# Patient Record
Sex: MALE | Race: WHITE | NOT HISPANIC OR LATINO | Employment: OTHER | URBAN - METROPOLITAN AREA
[De-identification: names, ages, dates, MRNs, and addresses within clinical notes are randomized per-mention and may not be internally consistent; named-entity substitution may affect disease eponyms.]

---

## 2017-01-26 DIAGNOSIS — Z94.0 KIDNEY TRANSPLANTED: Primary | ICD-10-CM

## 2017-01-26 RX ORDER — AZATHIOPRINE 50 MG/1
50 TABLET ORAL DAILY
Qty: 30 TABLET | Refills: 6 | Status: SHIPPED | OUTPATIENT
Start: 2017-01-26 | End: 2017-04-27

## 2017-02-02 ENCOUNTER — TRANSFERRED RECORDS (OUTPATIENT)
Dept: HEALTH INFORMATION MANAGEMENT | Facility: CLINIC | Age: 57
End: 2017-02-02

## 2017-02-03 DIAGNOSIS — Z94.0 KIDNEY TRANSPLANTED: Primary | ICD-10-CM

## 2017-02-03 RX ORDER — PREDNISONE 5 MG/1
5 TABLET ORAL DAILY
Qty: 30 TABLET | Refills: 0 | Status: SHIPPED | OUTPATIENT
Start: 2017-02-03 | End: 2017-03-08

## 2017-03-08 DIAGNOSIS — Z94.0 KIDNEY TRANSPLANTED: Primary | ICD-10-CM

## 2017-03-08 RX ORDER — PREDNISONE 5 MG/1
5 TABLET ORAL DAILY
Qty: 30 TABLET | Refills: 0 | Status: SHIPPED | OUTPATIENT
Start: 2017-03-08 | End: 2017-03-23

## 2017-03-10 DIAGNOSIS — B18.1 VIRAL HEPATITIS B CHRONIC (H): ICD-10-CM

## 2017-03-10 LAB
ALBUMIN SERPL-MCNC: 3.5 G/DL (ref 3.4–5)
ALP SERPL-CCNC: 102 U/L (ref 40–150)
ALT SERPL W P-5'-P-CCNC: 21 U/L (ref 0–70)
ANION GAP SERPL CALCULATED.3IONS-SCNC: 9 MMOL/L (ref 3–14)
AST SERPL W P-5'-P-CCNC: 22 U/L (ref 0–45)
BILIRUB DIRECT SERPL-MCNC: 0.3 MG/DL (ref 0–0.2)
BILIRUB SERPL-MCNC: 0.9 MG/DL (ref 0.2–1.3)
BUN SERPL-MCNC: 14 MG/DL (ref 7–30)
CALCIUM SERPL-MCNC: 9.2 MG/DL (ref 8.5–10.1)
CHLORIDE SERPL-SCNC: 108 MMOL/L (ref 94–109)
CO2 SERPL-SCNC: 26 MMOL/L (ref 20–32)
CREAT SERPL-MCNC: 0.96 MG/DL (ref 0.66–1.25)
ERYTHROCYTE [DISTWIDTH] IN BLOOD BY AUTOMATED COUNT: 16.5 % (ref 10–15)
GFR SERPL CREATININE-BSD FRML MDRD: 82 ML/MIN/1.7M2
GLUCOSE SERPL-MCNC: 89 MG/DL (ref 70–99)
HCT VFR BLD AUTO: 42.4 % (ref 40–53)
HGB BLD-MCNC: 13.5 G/DL (ref 13.3–17.7)
INR PPP: 0.97 (ref 0.86–1.14)
MCH RBC QN AUTO: 29.3 PG (ref 26.5–33)
MCHC RBC AUTO-ENTMCNC: 31.8 G/DL (ref 31.5–36.5)
MCV RBC AUTO: 92 FL (ref 78–100)
PLATELET # BLD AUTO: 346 10E9/L (ref 150–450)
POTASSIUM SERPL-SCNC: 4.4 MMOL/L (ref 3.4–5.3)
PROT SERPL-MCNC: 7.3 G/DL (ref 6.8–8.8)
RBC # BLD AUTO: 4.61 10E12/L (ref 4.4–5.9)
SODIUM SERPL-SCNC: 143 MMOL/L (ref 133–144)
WBC # BLD AUTO: 8.2 10E9/L (ref 4–11)

## 2017-03-14 LAB
HBV DNA SERPL NAA+PROBE-ACNC: ABNORMAL [IU]/ML
HBV DNA SERPL NAA+PROBE-LOG IU: <1.3 {LOG_IU}/ML

## 2017-03-15 ENCOUNTER — OFFICE VISIT (OUTPATIENT)
Dept: GASTROENTEROLOGY | Facility: CLINIC | Age: 57
End: 2017-03-15
Attending: PHYSICIAN ASSISTANT
Payer: MEDICARE

## 2017-03-15 ENCOUNTER — OFFICE VISIT (OUTPATIENT)
Dept: DERMATOLOGY | Facility: CLINIC | Age: 57
End: 2017-03-15

## 2017-03-15 VITALS
TEMPERATURE: 98 F | SYSTOLIC BLOOD PRESSURE: 124 MMHG | HEART RATE: 61 BPM | DIASTOLIC BLOOD PRESSURE: 84 MMHG | OXYGEN SATURATION: 96 % | WEIGHT: 191.4 LBS | HEIGHT: 67 IN | BODY MASS INDEX: 30.04 KG/M2

## 2017-03-15 DIAGNOSIS — L73.8 SEBACEOUS HYPERPLASIA: ICD-10-CM

## 2017-03-15 DIAGNOSIS — L82.0 INFLAMED SEBORRHEIC KERATOSIS: ICD-10-CM

## 2017-03-15 DIAGNOSIS — B18.1 VIRAL HEPATITIS B CHRONIC (H): Primary | ICD-10-CM

## 2017-03-15 DIAGNOSIS — L30.4 INTERTRIGO: Primary | ICD-10-CM

## 2017-03-15 DIAGNOSIS — L82.1 SK (SEBORRHEIC KERATOSIS): ICD-10-CM

## 2017-03-15 DIAGNOSIS — B07.8 COMMON WART: ICD-10-CM

## 2017-03-15 DIAGNOSIS — Z94.0 STATUS POST DECEASED-DONOR KIDNEY TRANSPLANTATION: ICD-10-CM

## 2017-03-15 DIAGNOSIS — Z85.828 HISTORY OF NONMELANOMA SKIN CANCER: ICD-10-CM

## 2017-03-15 PROCEDURE — 99212 OFFICE O/P EST SF 10 MIN: CPT | Mod: ZF

## 2017-03-15 RX ORDER — HYDROCORTISONE 25 MG/G
OINTMENT TOPICAL 2 TIMES DAILY
Qty: 28.35 G | Refills: 2 | Status: SHIPPED | OUTPATIENT
Start: 2017-03-15 | End: 2017-09-12

## 2017-03-15 ASSESSMENT — PAIN SCALES - GENERAL
PAINLEVEL: NO PAIN (0)
PAINLEVEL: NO PAIN (0)
PAINLEVEL: MILD PAIN (3)

## 2017-03-15 NOTE — MR AVS SNAPSHOT
After Visit Summary   3/15/2017    Jerad Ross    MRN: [id number]           Patient Information     Date Of Birth          1960        Visit Information        Provider Department      3/15/2017 12:00 PM Foster De Guzman MD [facility]        Today's Diagnoses     Intertrigo    -  1    SK (seborrheic keratosis)        Sebaceous hyperplasia        Common wart        History of nonmelanoma skin cancer        Inflamed seborrheic keratosis          Care Instructions    Cryotherapy    What is it?    Use of a very cold liquid, such as liquid nitrogen, to freeze and destroy abnormal skin cells that need to be removed    What should I expect?    Tenderness and redness    A small blister that might grow and fill with dark purple blood. There may be crusting.    More than one treatment may be needed if the lesions do not go away.    How do I care for the treated area?    Gently wash the area with your hands when bathing.    Use a thin layer of Vaseline to help with healing. You may use a Band-Aid.     The area should heal within 7-10 days and may leave behind a pink or lighter color.     Do not use an antibiotic or Neosporin ointment.     You may take acetaminophen (Tylenol) for pain.     Call your Doctor if you have:    Severe pain    Signs of infection (warmth, redness, cloudy yellow drainage, and or a bad smell)    Questions or concerns    Who should I call with questions?       [facility]: [phone]       [facility]: [phone]       For urgent needs outside of business hours call the [facility] at [phone]        and ask for the dermatology resident on call          Follow-ups after your visit        Your next 10 appointments already scheduled     May 15, 2017  9:00 AM CDT   RETURN GLAUCOMA with Viki Rizzo MD   Eye Clinic ([facility])    [address]  [facility] [address]   [phone]            Jun 12, 2017  2:20 PM CDT   (Arrive by 1:50 PM)   New Kidney Transplant with Nolan Lovett MD   [facility] ([facility])    [address] [place] [id number] [phone]            Sep 08, 2017 10:15 AM CDT   LAB with  LAB   [facility] ([facility])    [address] [place] [id number] [phone]           Patient must bring picture ID.  Patient should be prepared to give a urine specimen  Please do not eat 10-12 hours before your appointment if you are coming in fasting for labs on lipids, cholesterol, or glucose (sugar).  Pregnant women should follow their Care Team instructions. Water with medications is okay. Do not drink coffee or other fluids.   If you have concerns about taking  your medications, please ask at office or if scheduling via [employer][pecialists, send a message by clicking on Secure Messaging, Message Your Care Team.            Sep 12, 2017 10:10 AM CDT   (Arrive by 9:55 AM)   New General Liver with Lina Claros MD   [facility] ([facility])    [address] [place] [address] [id number] [phone]              Who to contact     Please call your clinic at [phone] to:    Ask questions about your health    Make or cancel appointments    Discuss your medicines    Learn about your test results    Speak to your doctor   If you have compliments or concerns about an experience at your clinic, or if you wish to file a complaint, please contact [facility] Patient Relations at [phone] or email us at [email or web address]         Additional Information About Your Visit        Home Inventory S[pecialists Information     [employer][pecialists gives you secure access to your electronic health record. If you see a primary care provider, you can also send messages to  your care team and make appointments. If you have questions, please call your primary care clinic.  If you do not have a primary care provider, please call [phone] and they will assist you.      [employer] is an electronic gateway that provides easy, online access to your medical records. With [employer], you can request a clinic appointment, read your test results, renew a prescription or communicate with your care team.     To access your existing account, please contact your [facility] or call [phone] for assistance.        Care EveryWhere ID     This is your Care EveryWhere ID. This could be used by other organizations to access your [place] medical records  [id number]         Blood Pressure from Last 3 Encounters:   03/15/17 124/84   08/31/16 116/79   01/21/16 128/84    Weight from Last 3 Encounters:   03/15/17 86.8 kg (191 lb 6.4 oz)   08/31/16 82.3 kg (181 lb 6.4 oz)   01/21/16 87.3 kg (192 lb 6.4 oz)              We Performed the Following     DESTRUCT BENIGN LESION, UP TO 14          Today's Medication Changes          These changes are accurate as of: 3/15/17 11:59 PM.  If you have any questions, ask your nurse or doctor.               Start taking these medicines.        Dose/Directions    hydrocortisone 2.5 % ointment   Used for:  Intertrigo   Started by:  Foster De Guzman MD        Apply topically 2 times daily   Quantity:  28.35 g   Refills:  2            Where to get your medicines      These medications were sent to [id number] - [place], [place] - [address] AT [facility] [address]  [facility] [address], [place] [address]    Hours:  24-hours Phone:  [phone]     hydrocortisone 2.5 % ointment                Primary Care Provider Office Phone # Fax #    Aston Perry [phone]        [facility]IC[facility]ANS [address] [place] [address]  [facility] [address]        Thank you!     Thank you for choosing [facility]  for your care. Our goal is always to provide you with excellent care. Hearing back from our patients is one way we can continue to improve our services. Please take a few minutes to complete the written survey that you may receive in the mail after your visit with us. Thank you!             Your Updated Medication List - Protect others around you: Learn how to safely use, store and throw away your medicines at www.disposemymeds.org.          This list is accurate as of: 3/15/17 11:59 PM.  Always use your most recent med list.                   Brand Name Dispense Instructions for use    amLODIPine 5 MG tablet    NORVASC    90 tablet    Take 1 tablet (5 mg) by mouth daily       aspirin 81 MG tablet      Take by mouth daily       atorvastatin 20 MG tablet    LIPITOR    90 tablet    Take 1 tablet (20 mg) by mouth daily You are rox to see your Cardiologist call [phone] to schedule.       azaTHIOprine 50 MG tablet    IMURAN    30 tablet    Take 1 tablet (50 mg) by mouth daily       BETA BLOCKER NOT PRESCRIBED (INTENTIONAL)      Beta Blocker not prescribed intentionally due to Bradycardia < 50 bpm without beta blocker therapy       calcium citrate-vitamin D 315-200 MG-UNIT Tabs per tablet    CITRACAL     Take 1 tablet by mouth daily.       clopidogrel 75 MG tablet    PLAVIX    90 tablet    Take 1 tablet (75 mg) by mouth daily       cycloSPORINE modified capsule     120 capsule    TAKE 2 CAPSULES BY MOUTH TWICE DAILY       entecavir 0.5 MG tablet    BARACLUDE    90 tablet    Take 1 tablet (0.5 mg) by mouth daily       hydrocortisone 2.5 % ointment     28.35 g    Apply topically 2 times daily       ibuprofen 200 MG tablet    ADVIL/MOTRIN     Take 200 mg by mouth every 4 hours as needed for mild pain       isosorbide mononitrate 30 MG 24 hr tablet    IMDUR    45 tablet    Take 0.5 tablets (15 mg) by mouth daily       latanoprost 0.005 % ophthalmic solution    XALATAN    3 Bottle    Place 1 drop into both  eyes At Bedtime       losartan 50 MG tablet    COZAAR    90 tablet    Take 1 tablet (50 mg) by mouth daily       METAMUCIL PO      Take 2 teaspoonful by mouth daily       MULTIVITAMIN/IRON PO      Take 1 tablet by mouth daily       OMEGA 3 PO      Take 1 capsule by mouth daily       omeprazole 20 MG tablet    priLOSEC OTC    30 tablet    Take  by mouth daily.       PROZAC PO      Take 20 mg by mouth       TYLENOL 325 MG tablet   Generic drug:  acetaminophen      Take 1-2 tablets by mouth every 6 hours as needed.

## 2017-03-15 NOTE — MR AVS SNAPSHOT
After Visit Summary   3/15/2017    Jerad Ross    MRN: [id number]           Patient Information     Date Of Birth          1960        Visit Information        Provider Department      3/15/2017 10:30 AM Shannon Cruz PA-C [facility]         Follow-ups after your visit        Follow-up notes from your care team     Return in about 6 months (around 9/15/2017).      Your next 10 appointments already scheduled     Mar 15, 2017 10:30 AM CDT   (Arrive by 10:15 AM)   Return General Liver with Shannon Cruz PA-C   [facility] ([facility])    [address] [address]   [phone]            Mar 15, 2017 12:00 PM CDT   (Arrive by 11:45 AM)   Return Visit with Foster De Guzman MD   [facility] ([facility])    [address] [place] [id number] [phone]            May 15, 2017  9:00 AM CDT   RETURN GLAUCOMA with Viki Rizzo MD   Eye Clinic ([facility])    [address] [facility]  9th Fl Clin 9a  [facility] [address]   [phone]            Jun 12, 2017  2:20 PM CDT   (Arrive by 1:50 PM)   New Kidney Transplant with Nolan Lovett MD   [facility] ([facility])    [address] [place] [phone]   [phone]            Sep 08, 2017 10:15 AM CDT   LAB with  LAB   [facility] ([facility])    [address] [place] [id number] [phone]           Patient must bring picture ID.  Patient should be prepared to give a urine specimen  Please do not eat 10-12 hours before your appointment if you are coming in fasting for labs on lipids, cholesterol, or glucose (sugar).  Pregnant women should follow their Care Team instructions. Water with medications is okay. Do not drink coffee or  "other fluids.   If you have concerns about taking  your medications, please ask at office or if scheduling via [employer], send a message by clicking on Secure Messaging, Message Your Care Team.            Sep 12, 2017 10:10 AM CDT   (Arrive by 9:55 AM)   [facility] General Liver with Lina Claros MD   [facility] ([facility])    [address] [address] [phone]              Who to contact     If you have questions or need follow up information about today's clinic visit or your schedule please contact [facility] directly at [phone].  Normal or non-critical lab and imaging results will be communicated to you by MyChart, letter or phone within 4 business days after the clinic has received the results. If you do not hear from us within 7 days, please contact the clinic through [employer] or phone. If you have a critical or abnormal lab result, we will notify you by phone as soon as possible.  Submit refill requests through [employer] or call your pharmacy and they will forward the refill request to us. Please allow 3 business days for your refill to be completed.          Additional Information About Your Visit        [employer] Information     [employer] gives you secure access to your electronic health record. If you see a primary care provider, you can also send messages to your care team and make appointments. If you have questions, please call your primary care clinic.  If you do not have a primary care provider, please call [phone] and they will assist you.        Care EveryWhere ID     This is your Care EveryWhere ID. This could be used by other organizations to access your [facility] medical records  [id number]        Your Vitals Were     Pulse Temperature Height Pulse Oximetry BMI (Body Mass Index)       61 98  F (36.7  C) (Oral) 1.702 m (5' 7\") 96% 29.98 kg/m2        Blood Pressure from Last 3 Encounters:   03/15/17 124/84 "   08/31/16 116/79   01/21/16 128/84    Weight from Last 3 Encounters:   03/15/17 86.8 kg (191 lb 6.4 oz)   08/31/16 82.3 kg (181 lb 6.4 oz)   01/21/16 87.3 kg (192 lb 6.4 oz)              Today, you had the following     No orders found for display       Primary Care Provider Office Phone # Fax #    Aston Perry [phone]        [facility]ICIANS [address] [facility] [address]  [facility] [address]        Thank you!     Thank you for choosing [facility]  for your care. Our goal is always to provide you with excellent care. Hearing back from our patients is one way we can continue to improve our services. Please take a few minutes to complete the written survey that you may receive in the mail after your visit with us. Thank you!             Your Updated Medication List - Protect others around you: Learn how to safely use, store and throw away your medicines at www.disposemymeds.org.          This list is accurate as of: 3/15/17 10:02 AM.  Always use your most recent med list.                   Brand Name Dispense Instructions for use    amLODIPine 5 MG tablet    NORVASC    90 tablet    Take 1 tablet (5 mg) by mouth daily       aspirin 81 MG tablet      Take by mouth daily       atorvastatin 20 MG tablet    LIPITOR    90 tablet    Take 1 tablet (20 mg) by mouth daily You are rox to see your Cardiologist call [phone] to schedule.       azaTHIOprine 50 MG tablet    IMURAN    30 tablet    Take 1 tablet (50 mg) by mouth daily       BETA BLOCKER NOT PRESCRIBED (INTENTIONAL)      Beta Blocker not prescribed intentionally due to Bradycardia < 50 bpm without beta blocker therapy       calcium citrate-vitamin D 315-200 MG-UNIT Tabs per tablet    CITRACAL     Take 1 tablet by mouth daily.       clopidogrel 75 MG tablet    PLAVIX    90 tablet    Take 1 tablet (75 mg) by mouth daily       cycloSPORINE modified capsule     120 capsule    TAKE 2 CAPSULES BY MOUTH TWICE DAILY       entecavir 0.5 MG  tablet    BARACLUDE    90 tablet    Take 1 tablet (0.5 mg) by mouth daily       ibuprofen 200 MG tablet    ADVIL/MOTRIN     Take 200 mg by mouth every 4 hours as needed for mild pain       isosorbide mononitrate 30 MG 24 hr tablet    IMDUR    45 tablet    Take 0.5 tablets (15 mg) by mouth daily       latanoprost 0.005 % ophthalmic solution    XALATAN    3 Bottle    Place 1 drop into both eyes At Bedtime       losartan 50 MG tablet    COZAAR    90 tablet    Take 1 tablet (50 mg) by mouth daily       METAMUCIL PO      Take 2 teaspoonful by mouth daily       MULTIVITAMIN/IRON PO      Take 1 tablet by mouth daily       OMEGA 3 PO      Take 1 capsule by mouth daily       omeprazole 20 MG tablet    priLOSEC OTC    30 tablet    Take  by mouth daily.       predniSONE 5 MG tablet    DELTASONE    30 tablet    Take 1 tablet (5 mg) by mouth daily       PROZAC PO      Take 20 mg by mouth       TYLENOL 325 MG tablet   Generic drug:  acetaminophen      Take 1-2 tablets by mouth every 6 hours as needed.

## 2017-03-15 NOTE — PROGRESS NOTES
Division of Gastroenterology, Hepatology and Nutrition Department of Medicine     Assessment/Plan  Chronic Hepatitis B  S/p Kidney Transplant, 1993    Jerad Ross has chronic Hepatitis B in the setting of s/p kidney transplant from 1993.  He is on immunosuppression,so we discussed the importance to stay on Hepatitis B treatment to prevent a flare.  He has been only intermittently taking the medication in the past.  He now has a marjan, so has been taking the entecavir consistently for the past 6 months.  Labs today show the transaminases are normal with an ALT of 21 and AST of 22.    The HBV DNA quant is <20.  The US from 3/10/17 showed the liver has no suspicious lesions.  Mr. Ross will have a follow-up appointment in 6 months.      Thank you for allowing me to participate in the care of this patient.  If you have any questions regarding this visit and plan of care, please do not hesitate to page me at [phone].    Shannon Cruz MS, PA-C  Division of Gastroenterology, Hepatology and Nutrition      HPI  Jerad Ross is a pleasant 57 year old  male  with chronic hepatitis B in the setting of s/p kidney transplant from 1993. This is his second kidney transplant.   He was diagnosed with HBV at age 17 when he was on dialysis.  He remembers being very sick and jaundiced at the time.    He has been prescribed entecavir, but has only taken it intermittently due to the cost. He has now been approved for a marjan so is consistently taking the medication.    He has no symptoms to report today and says he feels great.   This patient also has the following significant past medical history:   Patient Active Problem List   Diagnosis     BCC (basal cell carcinoma), trunk     JULIANE (obstructive sleep apnea)     HTN (hypertension)     CAD (coronary artery disease)     NSTEMI (non-ST elevated myocardial infarction) (H)     Chest pain     Depression     Diverticulosis     Hemorrhoids     Status post  -donor kidney transplantation     Basal cell carcinoma     Squamous cell carcinoma (H)     Hyperlipidemia     CRP elevated     Glaucoma     AION (acute ischemic optic neuropathy)     Paracentral scotoma     Hip pain     Inflamed seborrheic keratosis     Intertrigo     Viral hepatitis B chronic (H)          ROS:  Comprehensive review of systems is negative, unless otherwise noted above    Past Medical History   Diagnosis Date     AION (acute ischemic optic neuropathy)      Basal cell carcinoma      CAD (coronary artery disease) 2014     Chest pain 2014     Chronic hepatitis B (H)      CRP elevated      Depression      Diverticulosis      Gastric ulcer with hemorrhage 12     Glaucoma      OHTN     Hemorrhoids      HTN (hypertension)      Hyperlipidemia      NSTEMI (non-ST elevated myocardial infarction) (H) 2014     JULIANE (obstructive sleep apnea)      Paracentral scotoma      LE     Squamous cell carcinoma (H)      Status post -donor kidney transplantation      focal glomerulosclerosis        Current Outpatient Prescriptions   Medication Sig Dispense Refill     predniSONE (DELTASONE) 5 MG tablet Take 1 tablet (5 mg) by mouth daily 30 tablet 0     azaTHIOprine (IMURAN) 50 MG tablet Take 1 tablet (50 mg) by mouth daily 30 tablet 6     entecavir (BARACLUDE) 0.5 MG tablet Take 1 tablet (0.5 mg) by mouth daily 90 tablet 1     NEORAL 25 MG PO CAPSULE TAKE 2 CAPSULES BY MOUTH TWICE DAILY 120 capsule 5     latanoprost (XALATAN) 0.005 % ophthalmic solution Place 1 drop into both eyes At Bedtime 3 Bottle 3     losartan (COZAAR) 50 MG tablet Take 1 tablet (50 mg) by mouth daily 90 tablet 3     clopidogrel (PLAVIX) 75 MG tablet Take 1 tablet (75 mg) by mouth daily 90 tablet 3     isosorbide mononitrate (IMDUR) 30 MG 24 hr tablet Take 0.5 tablets (15 mg) by mouth daily 45 tablet 3     amLODIPine (NORVASC) 5 MG tablet Take 1 tablet (5 mg) by mouth daily 90 tablet 3     ibuprofen (ADVIL,MOTRIN)  200 MG tablet Take 200 mg by mouth every 4 hours as needed for mild pain       atorvastatin (LIPITOR) 20 MG tablet Take 1 tablet (20 mg) by mouth daily You are rox to see your Cardiologist call [phone] to schedule. 90 tablet 1     FLUoxetine HCl (PROZAC PO) Take 20 mg by mouth       BETA BLOCKER NOT PRESCRIBED, INTENTIONAL, Beta Blocker not prescribed intentionally due to Bradycardia < 50 bpm without beta blocker therapy  0     aspirin 81 MG tablet Take by mouth daily       Omega-3 Fatty Acids (OMEGA 3 PO) Take 1 capsule by mouth daily       omeprazole (PRILOSEC OTC) 20 MG tablet Take  by mouth daily. 30 tablet      calcium citrate-vitamin D (CITRACAL) 315-200 MG-UNIT TABS Take 1 tablet by mouth daily.       Psyllium (METAMUCIL PO) Take 2 teaspoonful by mouth daily        Multiple Vitamins-Iron (MULTIVITAMIN/IRON PO) Take 1 tablet by mouth daily        acetaminophen (TYLENOL) 325 MG tablet Take 1-2 tablets by mouth every 6 hours as needed.       [DISCONTINUED] latanoprost (XALATAN) 0.005 % ophthalmic solution Place 1 drop Into the left eye At Bedtime RX from Dr Matthias TREVINO         Allergies   Allergen Reactions     Penicillins Shortness Of Breath and Hives     Contrast Dye Nausea and Vomiting     Keflex [Cephalexin Hcl] Other (See Comments)     Pt could not recall reaction     Tetracycline Other (See Comments)     Patient could not recall reaction     Sulfa Drugs Rash       Family History   Problem Relation Age of Onset     Cardiovascular Father      AI with valve repair     Hypertension Father      CANCER Paternal Grandmother      ovarian ca     CEREBROVASCULAR DISEASE Paternal Grandfather      CEREBROVASCULAR DISEASE Maternal Grandfather      Skin Cancer No family hx of      Glaucoma No family hx of      Macular Degeneration No family hx of      Amblyopia No family hx of        Social History     Social History     Marital status:      Spouse name: N/A     Number of children: N/A     Years of education:  "N/A     Occupational History      Disabled      [employer]     Social History Main Topics     Smoking status: Former Smoker     Packs/day: 1.00     Years: 9.00     Quit date: 1/1/1988     Smokeless tobacco: Former User     Alcohol use 0.0 oz/week     0 Standard drinks or equivalent per week      Comment: rarely 1 drink per month     Drug use: No     Sexual activity: Not Asked     Other Topics Concern     Special Diet No     Exercise Yes     walks     Social History Narrative       OBJECTIVE  Vitals: Blood pressure 124/84, pulse 61, temperature 98  F (36.7  C), temperature source Oral, height 1.702 m (5' 7\"), weight 86.8 kg (191 lb 6.4 oz), SpO2 96 %. Body mass index is 29.98 kg/(m^2).  Gen: No acute distress, well nourished  Eyes: Sclera anicteric  Respiratory: Clear to auscultation bilaterally, no overt wheezing or rales  CV: RRR without overt murmur  Abdomen:  Soft, nontender, nondistended, normal bowel sounds  Difficult to assess organomegaly due to body habitus   Skin:  no jaundice  Psych: Normal speech, normal affect    Recent Laboratory Test Results  Lab Results   Component Value Date    WBC 8.2 03/10/2017    HGB 13.5 03/10/2017    HCT 42.4 03/10/2017     03/10/2017    CHOL 136 01/20/2015    TRIG 106 01/20/2015    HDL 49 01/20/2015    ALT 21 03/10/2017    AST 22 03/10/2017     03/10/2017    BUN 14 03/10/2017    CO2 26 03/10/2017    TSH 0.56 05/06/2015    PSA 0.70 01/07/2011    INR 0.97 03/10/2017           "

## 2017-03-15 NOTE — PATIENT INSTRUCTIONS
Cryotherapy    What is it?    Use of a very cold liquid, such as liquid nitrogen, to freeze and destroy abnormal skin cells that need to be removed    What should I expect?    Tenderness and redness    A small blister that might grow and fill with dark purple blood. There may be crusting.    More than one treatment may be needed if the lesions do not go away.    How do I care for the treated area?    Gently wash the area with your hands when bathing.    Use a thin layer of Vaseline to help with healing. You may use a Band-Aid.     The area should heal within 7-10 days and may leave behind a pink or lighter color.     Do not use an antibiotic or Neosporin ointment.     You may take acetaminophen (Tylenol) for pain.     Call your Doctor if you have:    Severe pain    Signs of infection (warmth, redness, cloudy yellow drainage, and or a bad smell)    Questions or concerns    Who should I call with questions?       [facility]: [phone]       [facility]: [phone]       For urgent needs outside of business hours call the [facility] at [phone]        and ask for the dermatology resident on call

## 2017-03-15 NOTE — NURSING NOTE
Dermatology Rooming Note    Jerad Ross's goals for this visit include:   Chief Complaint   Patient presents with     Skin Check     Personal hx of BCC. Jerad notes a few spots of concern today.     [place], CMA

## 2017-03-15 NOTE — LETTER
3/15/2017       RE: Jerad Ross  [address]   [facility] [address]     Dear Colleague,    Thank you for referring your patient, Jerad Ross, to the [facility] DERMATOLOGY at [facility]. Please see a copy of my visit note below.    [facility] Dermatology Note    Dermatology Problem List:  1. History of kidney transplant 10/6/1993  - Immunosuppression: cyclosporine, imuran, prednisone   2. History of BCC - mid chest x 2, right axilla x 1 s/p excision   3. Inflamed seborrheic and verrucous keratoses s/p cryo  4. Intertrigo - hydrocortisone 2.5% ointment  5. Filiform warts - left lateral eyelid, left alteral canthus, left temple s/p cryo 3/15/17    CC:   Chief Complaint   Patient presents with     Skin Check     Personal hx of BCC. Jerad notes a few spots of concern today.     Date of Service: Mar 15, 2017    History of Present Illness:  Mr. Jerad Ross is a 57 year old male with a personal history of BCC presents for a skin check. Today the patient reports he has a spot of concern on the eyelid and left temple which bother him. The patient also reports an irritated area on the crease on his chest. He notes that in the summer it is worse than the winter. He reports that there are some spots on his lower neck and left jaw line which hurt when he shaves. The patient reports no other lesions of concern at this time.    Otherwise, the patient reports no painful, bleeding, nonhealing, or pruritic lesions, and denies new or changing moles.    Past Medical History:   Patient Active Problem List   Diagnosis     BCC (basal cell carcinoma), trunk     JULIANE (obstructive sleep apnea)     HTN (hypertension)     CAD (coronary artery disease)     NSTEMI (non-ST elevated myocardial infarction) (H)     Chest pain     Depression     Diverticulosis     Hemorrhoids     Status post -donor kidney transplantation     Basal cell carcinoma      Squamous cell carcinoma (H)     Hyperlipidemia     CRP elevated     Glaucoma     AION (acute ischemic optic neuropathy)     Paracentral scotoma     Hip pain     Inflamed seborrheic keratosis     Intertrigo     Viral hepatitis B chronic (H)     Past Medical History   Diagnosis Date     AION (acute ischemic optic neuropathy)      Basal cell carcinoma      CAD (coronary artery disease) 2014     Chest pain 2014     Chronic hepatitis B (H)      CRP elevated      Depression      Diverticulosis      Gastric ulcer with hemorrhage 12     Glaucoma      OHTN     Hemorrhoids      HTN (hypertension)      Hyperlipidemia      NSTEMI (non-ST elevated myocardial infarction) (H) 2014     JULIANE (obstructive sleep apnea)      Paracentral scotoma      LE     Squamous cell carcinoma (H)      Status post -donor kidney transplantation      focal glomerulosclerosis      Past Surgical History   Procedure Laterality Date     Extracapsular cataract extration with intraocular lens implant  4-20-10, 6-1-10     Rt, Lt     Hernia repair       Lt inguinal     Hip surgery       , bilat MITZI, revised ,      Knee surgery  ,      bilat TKA     Colectomy subtotal       10 cm, diverticulitis     Kidney surgery   and      transplant     Splenectomy       leukopenia, auxiliary spleen     Colonoscopy  2012     Procedure:COLONOSCOPY; Surgeon:SLOAN GALLARDO; Location:[facility]     Esophagoscopy, gastroscopy, duodenoscopy (egd), combined  2012     Procedure:COMBINED ESOPHAGOSCOPY, GASTROSCOPY, DUODENOSCOPY (EGD); Surgeon:SLOAN GALLARDO; Location:[facility]     Cataract iol, rt/lt  2000     RE     Cataract iol, rt/lt  2000     LE     Mohs micrographic procedure       Social History:  Not addressed at this visit    Family History:  Not addressed at this visit    Medications:  Current Outpatient Prescriptions   Medication Sig Dispense Refill     predniSONE  (DELTASONE) 5 MG tablet Take 1 tablet (5 mg) by mouth daily 30 tablet 0     azaTHIOprine (IMURAN) 50 MG tablet Take 1 tablet (50 mg) by mouth daily 30 tablet 6     entecavir (BARACLUDE) 0.5 MG tablet Take 1 tablet (0.5 mg) by mouth daily 90 tablet 1     NEORAL 25 MG PO CAPSULE TAKE 2 CAPSULES BY MOUTH TWICE DAILY 120 capsule 5     latanoprost (XALATAN) 0.005 % ophthalmic solution Place 1 drop into both eyes At Bedtime 3 Bottle 3     losartan (COZAAR) 50 MG tablet Take 1 tablet (50 mg) by mouth daily 90 tablet 3     clopidogrel (PLAVIX) 75 MG tablet Take 1 tablet (75 mg) by mouth daily 90 tablet 3     isosorbide mononitrate (IMDUR) 30 MG 24 hr tablet Take 0.5 tablets (15 mg) by mouth daily 45 tablet 3     amLODIPine (NORVASC) 5 MG tablet Take 1 tablet (5 mg) by mouth daily 90 tablet 3     ibuprofen (ADVIL,MOTRIN) 200 MG tablet Take 200 mg by mouth every 4 hours as needed for mild pain       atorvastatin (LIPITOR) 20 MG tablet Take 1 tablet (20 mg) by mouth daily You are rox to see your Cardiologist call [phone] to schedule. 90 tablet 1     FLUoxetine HCl (PROZAC PO) Take 20 mg by mouth       BETA BLOCKER NOT PRESCRIBED, INTENTIONAL, Beta Blocker not prescribed intentionally due to Bradycardia < 50 bpm without beta blocker therapy  0     aspirin 81 MG tablet Take by mouth daily       Omega-3 Fatty Acids (OMEGA 3 PO) Take 1 capsule by mouth daily       omeprazole (PRILOSEC OTC) 20 MG tablet Take  by mouth daily. 30 tablet      calcium citrate-vitamin D (CITRACAL) 315-200 MG-UNIT TABS Take 1 tablet by mouth daily.       Psyllium (METAMUCIL PO) Take 2 teaspoonful by mouth daily        Multiple Vitamins-Iron (MULTIVITAMIN/IRON PO) Take 1 tablet by mouth daily        acetaminophen (TYLENOL) 325 MG tablet Take 1-2 tablets by mouth every 6 hours as needed.       [DISCONTINUED] latanoprost (XALATAN) 0.005 % ophthalmic solution Place 1 drop Into the left eye At Bedtime RX from Dr Matthias TREVINO       Allergies:  Allergies    Allergen Reactions     Penicillins Shortness Of Breath and Hives     Contrast Dye Nausea and Vomiting     Keflex [Cephalexin Hcl] Other (See Comments)     Pt could not recall reaction     Tetracycline Other (See Comments)     Patient could not recall reaction     Sulfa Drugs Rash     Review of Systems:  - Skin: As above in HPI. No additional skin concerns.    Physical exam:  Vitals: There were no vitals taken for this visit.  GEN: This is a well developed, well-nourished male in no acute distress, in a pleasant mood.      SKIN: Total skin excluding the undergarment areas was performed. The exam included the head/face, neck, both arms, chest, back, abdomen, both legs, digits and/or nails.   - Symmetrical about the inframammary folds, there are erythematous patches bilaterally with a slightly macerated surface and slightly red rims around the keratoses  - Left lateral eyelid and left lateral canthus: total of 4 verrucous filiform papules with two largest being central and smaller lesions on the upper eyelid and temple near hairline  - Stuck on tan to brown papules on the abdomen, trunk, arms, face  - Scattered acneiform papules on the upper chest  - No red lesions are concerning for BCC at this point  - Verrucous keratoses and stucco keratoses on the legs and feet  - No other lesions of concern on areas examined.     Impression/Plan:  1. History of renal transplant 10/6/1993.     2. History of BCC - mid chest x 2, right axilla x 1 s/p excision    - NERD, continue yearly exams    3. Intertrigo  - Start hydrocortisone 2.5% ointment - apply BID to affected areas    4. Filiform warts - left lateral eyelid, left lateral canthus, left temple. Previously biopsied, but recurred.   - Cryotherapy procedure note: After verbal consent and discussion of risks and benefits including but no limited to dyspigmentation/scar, blister, and pain, 4 was(were) treated with 1-2mm freeze border for 2 cycles with liquid nitrogen. Post  cryotherapy instructions were provided.     5. Irritated seborrheic keratoses - lower anterior neck x 2, left jaw line x 1, lower central back x 1  - Cryotherapy procedure note: After verbal consent and discussion of risks and benefits including but no limited to dyspigmentation/scar, blister, and pain, 4 was(were) treated with 1-2mm freeze border for 2 cycles with liquid nitrogen. Post cryotherapy instructions were provided.     6. Seborrheic keratoses - abdomen, trunk, arms, face  - No further intervention required. Patient to report changes.     7. Verrucous keratoses on the legs and stucco keratoses on the feet  - No further intervention required. Patient to report changes.       Follow-up in 1 year, earlier for new or changing lesions.    Staff Involved:  Staff Only    Scribe Disclosure:   I, Maryuri Fierro, am serving as a scribe to document services personally performed by Dr. Foster De Guzman, based on data collection and the provider's statements to me.     Staff attestation:  The documentation recorded by the scribe accurately reflects the services I personally performed and the decisions I personally made.    Foster De Guzman MD  Staff Dermatologist    Department of Dermatology

## 2017-03-15 NOTE — NURSING NOTE
Chief Complaint   Patient presents with     RECHECK     Hepatitis B   Pt roomed, vitals, meds, and allergies reviewed with pt. Pt ready for provider.  Rolando Miller, CMA

## 2017-03-23 DIAGNOSIS — Z94.0 KIDNEY TRANSPLANTED: Primary | ICD-10-CM

## 2017-03-27 RX ORDER — PREDNISONE 5 MG/1
5 TABLET ORAL DAILY
Qty: 90 TABLET | Refills: 3 | Status: SHIPPED | OUTPATIENT
Start: 2017-03-27 | End: 2018-03-20

## 2017-04-27 DIAGNOSIS — Z94.0 KIDNEY TRANSPLANTED: ICD-10-CM

## 2017-04-27 RX ORDER — AZATHIOPRINE 50 MG/1
50 TABLET ORAL DAILY
Qty: 30 TABLET | Refills: 6 | Status: SHIPPED | OUTPATIENT
Start: 2017-04-27 | End: 2017-06-19 | Stop reason: ALTCHOICE

## 2017-05-15 ENCOUNTER — OFFICE VISIT (OUTPATIENT)
Dept: OPHTHALMOLOGY | Facility: CLINIC | Age: 57
End: 2017-05-15
Attending: OPHTHALMOLOGY
Payer: MEDICARE

## 2017-05-15 DIAGNOSIS — H40.9 GLAUCOMA: Primary | ICD-10-CM

## 2017-05-15 DIAGNOSIS — H40.053 BORDERLINE GLAUCOMA WITH OCULAR HYPERTENSION, BILATERAL: ICD-10-CM

## 2017-05-15 PROCEDURE — 92133 CPTRZD OPH DX IMG PST SGM ON: CPT | Mod: ZF | Performed by: OPHTHALMOLOGY

## 2017-05-15 PROCEDURE — 99213 OFFICE O/P EST LOW 20 MIN: CPT | Mod: 25,ZF

## 2017-05-15 ASSESSMENT — VISUAL ACUITY
METHOD: SNELLEN - LINEAR
OS_PH_SC: 20/40
OS_SC: 20/50
OD_SC: 20/500ECC
OS_SC+: -2

## 2017-05-15 ASSESSMENT — TONOMETRY
IOP_METHOD: APPLANATION
OS_IOP_MMHG: 25
OD_IOP_MMHG: 23

## 2017-05-15 ASSESSMENT — SLIT LAMP EXAM - LIDS
COMMENTS: NORMAL
COMMENTS: NORMAL

## 2017-05-15 ASSESSMENT — CUP TO DISC RATIO
OD_RATIO: 0.6
OS_RATIO: 0.5

## 2017-05-15 ASSESSMENT — CONF VISUAL FIELD: METHOD: COUNTING FINGERS

## 2017-05-15 NOTE — MR AVS SNAPSHOT
After Visit Summary   5/15/2017    Jerad Ross    MRN: [id number]           Patient Information     Date Of Birth          1960        Visit Information        Provider Department      5/15/2017 9:00 AM Viki Rizzo MD [facility]        Today's Diagnoses     Glaucoma    -  1       Follow-ups after your visit        Your next 10 appointments already scheduled     Jun 12, 2017  2:20 PM CDT   (Arrive by 1:50 PM)   New Kidney Transplant with Nolan Lovett MD   [facility] ([facility])    [address] [place] [id number] [phone]            Sep 08, 2017 10:15 AM CDT   LAB with  LAB   [facility] ([facility])    [address] [place] [id number] [phone]           Patient must bring picture ID.  Patient should be prepared to give a urine specimen  Please do not eat 10-12 hours before your appointment if you are coming in fasting for labs on lipids, cholesterol, or glucose (sugar).  Pregnant women should follow their Care Team instructions. Water with medications is okay. Do not drink coffee or other fluids.   If you have concerns about taking  your medications, please ask at office or if scheduling via [employer], send a message by clicking on Secure Messaging, Message Your Care Team.            Sep 12, 2017 10:10 AM CDT   (Arrive by 9:55 AM)   New General Liver with Lina Claros MD   [facility] ([facility])    [address] [place] [id number] [phone]              Future tests that were ordered for you today     Open Future Orders        Priority Expected Expires Ordered    OCT Optic Nerve RNFL Spectralis OU (both eyes) Routine  11/14/2018 5/15/2017            Who to contact     Please call your clinic at [phone] to:    Ask questions about your health    Make or cancel  appointments    Discuss your medicines    Learn about your test results    Speak to your doctor   If you have compliments or concerns about an experience at your clinic, or if you wish to file a complaint, please contact [facility] Patient Relations at [phone] or email us at [email or web address]         Additional Information About Your Visit        MyChart Information     [employer] [employer] gives you secure access to your electronic health record. If you see a primary care provider, you can also send messages to your care team and make appointments. If you have questions, please call your primary care clinic.  If you do not have a primary care provider, please call [phone] and they will assist you.      [employer] is an electronic gateway that provides easy, online access to your medical records. With [employer], you can request a clinic appointment, read your test results, renew a prescription or communicate with your care team.     To access your existing account, please contact your [facility] Clinic or call [phone] for assistance.        Care EveryWhere ID     This is your Care EveryWhere ID. This could be used by other organizations to access your [place] medical records  [id number]         Blood Pressure from Last 3 Encounters:   03/15/17 124/84   08/31/16 116/79   01/21/16 128/84    Weight from Last 3 Encounters:   03/15/17 86.8 kg (191 lb 6.4 oz)   08/31/16 82.3 kg (181 lb 6.4 oz)   01/21/16 87.3 kg (192 lb 6.4 oz)               Primary Care Provider Office Phone # Fax #    Aston Perry [phone]        [facility] [address] [place] [phone]  [facility] [address]        Thank you!     Thank you for choosing EYE CLINIC  for your care. Our goal is always to provide you with excellent care. Hearing back from our patients is one way we can continue to improve our services. Please take a few minutes to complete the  written survey that you may receive in the mail after your visit with us. Thank you!             Your Updated Medication List - Protect others around you: Learn how to safely use, store and throw away your medicines at www.disposemymeds.org.          This list is accurate as of: 5/15/17 10:11 AM.  Always use your most recent med list.                   Brand Name Dispense Instructions for use    amLODIPine 5 MG tablet    NORVASC    90 tablet    Take 1 tablet (5 mg) by mouth daily       aspirin 81 MG tablet      Take by mouth daily       atorvastatin 20 MG tablet    LIPITOR    90 tablet    Take 1 tablet (20 mg) by mouth daily You are rox to see your Cardiologist call [phone] to schedule.       azaTHIOprine 50 MG tablet    IMURAN    30 tablet    Take 1 tablet (50 mg) by mouth daily       BETA BLOCKER NOT PRESCRIBED (INTENTIONAL)      Beta Blocker not prescribed intentionally due to Bradycardia < 50 bpm without beta blocker therapy       calcium citrate-vitamin D 315-200 MG-UNIT Tabs per tablet    CITRACAL     Take 1 tablet by mouth daily.       clopidogrel 75 MG tablet    PLAVIX    90 tablet    Take 1 tablet (75 mg) by mouth daily       cycloSPORINE modified capsule     120 capsule    TAKE 2 CAPSULES BY MOUTH TWICE DAILY       entecavir 0.5 MG tablet    BARACLUDE    90 tablet    Take 1 tablet (0.5 mg) by mouth daily       hydrocortisone 2.5 % ointment     28.35 g    Apply topically 2 times daily       ibuprofen 200 MG tablet    ADVIL/MOTRIN     Take 200 mg by mouth every 4 hours as needed for mild pain       isosorbide mononitrate 30 MG 24 hr tablet    IMDUR    45 tablet    Take 0.5 tablets (15 mg) by mouth daily       latanoprost 0.005 % ophthalmic solution    XALATAN    3 Bottle    Place 1 drop into both eyes At Bedtime       losartan 50 MG tablet    COZAAR    90 tablet    Take 1 tablet (50 mg) by mouth daily       METAMUCIL PO      Take 2 teaspoonful by mouth daily       MULTIVITAMIN/IRON PO      Take 1 tablet  by mouth daily       OMEGA 3 PO      Take 1 capsule by mouth daily       omeprazole 20 MG tablet    priLOSEC OTC    30 tablet    Take  by mouth daily.       predniSONE 5 MG tablet    DELTASONE    90 tablet    Take 1 tablet (5 mg) by mouth daily       PROZAC PO      Take 20 mg by mouth       TYLENOL 325 MG tablet   Generic drug:  acetaminophen      Take 1-2 tablets by mouth every 6 hours as needed.

## 2017-05-15 NOTE — NURSING NOTE
Chief Complaints and History of Present Illnesses   Patient presents with     Ocular Hypertension Follow Up     6 month follow up for Ocular hypertension     HPI    Affected eye(s):  Both   Symptoms:     (Comment: Vision is unchanged BE.)   Floaters (Comment: has some back in left eye for last 1-2 months.)   No flashes   No redness   No tearing   No itching         Do you have eye pain now?:  No      Comments:  6 month follow up for Ocular hypertension.    Quirino CASTILLO  9:36 AM05/15/2017

## 2017-05-15 NOTE — PROGRESS NOTES
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   PC intraocular lens (not extremely nearsighted preop)   4/2010 6/2010    Latanoprost both eyes at bedtime     Worked in health administration, semi-retired/disability    Octopus visual field both eyes initial Octopus (11/16/16)   Right eye LVC with cecocentral scotoma, similar to HVF   Left eye superior and inferior arcuate, similar to HVF    Impression  Ocular hypertension, possible glaucoma with Anterior ischemic optic neuropathy (AION) overlay  OCT very thin, not helpful for follow-up except nerve rim may be useful in the future since it is much better than retinal nerve fiber layer   Would treat intraocular pressure since we cannot be sure if there is a visual field component of glaucomatous loss    Plan  Continue latanoprost both eyes   Return to clinic 6 months with LVC right eye and 24-2 left eye     Attending Physician Attestation:  Complete documentation of historical and exam elements from today's encounter can be found in the full encounter summary report (not reduplicated in this progress note). I personally obtained the chief complaint(s) and history of present illness. I confirmed and edited asnecessary the review of systems, past medical/surgical history, family history, social history, and examination findings as documented by others; and I examined the patient myself. I personally reviewed the relevant tests, images, and reports as documented above. I formulated and edited as necessary the assessment and plan and discussed the findings and management plan with the patient and family.  - Viki Rizzo MD 10:29 AM 5/15/2017

## 2017-05-31 DIAGNOSIS — Z94.0 KIDNEY REPLACED BY TRANSPLANT: Primary | ICD-10-CM

## 2017-06-01 RX ORDER — CYCLOSPORINE 25 MG/1
50 CAPSULE, LIQUID FILLED ORAL 2 TIMES DAILY
Qty: 120 CAPSULE | Refills: 11 | Status: SHIPPED | OUTPATIENT
Start: 2017-06-01 | End: 2017-07-24

## 2017-06-11 ASSESSMENT — ENCOUNTER SYMPTOMS
NECK PAIN: 0
MYALGIAS: 0
MUSCLE CRAMPS: 0
BACK PAIN: 0
MUSCLE WEAKNESS: 1
ARTHRALGIAS: 1
STIFFNESS: 1
JOINT SWELLING: 0

## 2017-06-12 ENCOUNTER — OFFICE VISIT (OUTPATIENT)
Dept: NEPHROLOGY | Facility: CLINIC | Age: 57
End: 2017-06-12
Attending: INTERNAL MEDICINE
Payer: MEDICARE

## 2017-06-12 VITALS
OXYGEN SATURATION: 97 % | BODY MASS INDEX: 29.57 KG/M2 | SYSTOLIC BLOOD PRESSURE: 106 MMHG | DIASTOLIC BLOOD PRESSURE: 71 MMHG | HEIGHT: 67 IN | WEIGHT: 188.4 LBS | HEART RATE: 61 BPM

## 2017-06-12 DIAGNOSIS — M25.552 PAIN OF BOTH HIP JOINTS: ICD-10-CM

## 2017-06-12 DIAGNOSIS — Z94.0 KIDNEY REPLACED BY TRANSPLANT: Primary | ICD-10-CM

## 2017-06-12 DIAGNOSIS — Z48.298 AFTERCARE FOLLOWING ORGAN TRANSPLANT: ICD-10-CM

## 2017-06-12 DIAGNOSIS — D84.9 IMMUNOSUPPRESSED STATUS (H): ICD-10-CM

## 2017-06-12 DIAGNOSIS — M25.551 PAIN OF BOTH HIP JOINTS: ICD-10-CM

## 2017-06-12 DIAGNOSIS — I15.1 HYPERTENSION SECONDARY TO OTHER RENAL DISORDERS: ICD-10-CM

## 2017-06-12 PROCEDURE — 99212 OFFICE O/P EST SF 10 MIN: CPT | Mod: ZF

## 2017-06-12 ASSESSMENT — PAIN SCALES - GENERAL: PAINLEVEL: NO PAIN (0)

## 2017-06-12 NOTE — LETTER
6/12/2017       RE: Jerad Ross  [address]     [facility] [id number]     Dear Colleague,    Thank you for referring your patient, Jerad Ross, to the [facility] NEPHROLOGY at [facility]. Please see a copy of my visit note below.    Assessment and Plan:   1. DDKT - baseline Cr ~ 0.8-1.1, which has remained stable.  With ongoing skin cancers, discussed possible immunosuppression changes.  Will change patient off azathioprine to mycophenolate mofetil 500 mg bid.  If tolerated, would taper off cyclosporine and increase mycophenolate mofetil to 750 mg bid.  Patient will remain on prednisone 5 mg daily.  2. HTN - well controlled at target of less than 140/90.  No changes.  3. CAD - asymptomatic.  Recommend continue exercise.  4. Chronic pain - recommend avoiding ibuprofen and NSAIDs if possible.  5. Chronic hepatitis B - undetectable hepatitis B DNA PCR.  Patient to continue close follow up with Hepatology.  6. Skin cancer - no new skin lesions.  Discussed immunosuppression change as noted above.  7. Recommend return visit in 12 months.    Assessment and plan was discussed with patient and he voiced his understanding and agreement.    Consult:  Jerad Ross was seen in consultation at the request of Dr. Perry for kidney transplant and immunosuppression management.    Reason for Visit:  Mr. Ross is here for transplant and immunosuppression management.    HPI:  Mr. 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         Transplant Hx:       Tx: DDKT  Date: 10/6/93       Present Maintenance IS: Cyclosporine, Azathioprine and Prednisone       Baseline Creatinine: 0.8-1.1    Mr. Ross reports feeling okay overall with some medical complaints.  He has a h/o FSGS dx 1970, but not sure what treatments he underwent other than prednisone.  Patient had progressive decline in kidney function and was initiated on HD in  "1976.  Patient then underwent DDKT 4/18/78, which was complicated by early acute cellular-mediated rejection treated with steroids and ALG.  That kidney lasted until 1991 when he returned to dialysis.  Patient is now status post DDKT 10/6/93.  He has done well with this transplant with no history of acute rejection and no kidney transplant biopsy with baseline creatinine ~ 0.8-1.1 on cyclosporine, azathioprine and prednisone.  His post transplant course has been complicated by CAD with h/o NSTEMI in 2014, as well as recurrent skin cancers.  In addition, due to his chronic steroid use, he has bilateral AVN of both hips and is status post bilateral hip replacements.  Patient continues to have chronic joint pain and takes ibuprofen almost daily.    His energy level has been okay to maybe a bit more tired lately and not quite normal.  He is active and does get some exercise, mostly with walking.  Denies any chest pain or shortness of breath with exertion.  Appetite is \"too good,\" but weight has been stable.  No nausea, vomiting or diarrhea.  No fever, sweats or chills.  No leg swelling.  No problems emptying his bladder.    Home BP: Not checked      ROS:  A comprehensive review of systems was obtained and negative, except as noted in the HPI or PMH.    Active Medical Problems:  Patient Active Problem List   Diagnosis     BCC (basal cell carcinoma), trunk     JULIANE (obstructive sleep apnea)     Hypertension secondary to other renal disorders     CAD (coronary artery disease)     NSTEMI (non-ST elevated myocardial infarction) (H)     Depression     Diverticulosis     Hemorrhoids     Kidney replaced by transplant     Basal cell carcinoma     Squamous cell carcinoma (H)     Dyslipidemia     CRP elevated     Glaucoma     AION (acute ischemic optic neuropathy)     Paracentral scotoma     Hip pain     Inflamed seborrheic keratosis     Intertrigo     Chronic hepatitis B (H)     Immunosuppressed status (H)     Hypogonadism in male "     GERD (gastroesophageal reflux disease)     Aftercare following organ transplant     PMH:   Medical record was reviewed and PMH was discussed with patient and noted below.  Past Medical History:   Diagnosis Date     AION (acute ischemic optic neuropathy)      Anemia in chronic renal disease      Avascular necrosis of bones of both hips (H)     s/p bilateral hip replacements     Basal cell carcinoma      CAD (coronary artery disease) 6/17/2014     Chronic hepatitis B (H)      Clostridium difficile colitis      CRP elevated      Depression      Diverticulosis      Dyslipidemia      FSGS (focal segmental glomerulosclerosis)      Gastric ulcer with hemorrhage 2/12/12     GERD (gastroesophageal reflux disease)      Glaucoma     OHTN     Hemorrhoids      Hypertension secondary to other renal disorders      Hypogonadism in male      Immunosuppressed status (H)      Kidney replaced by transplant     focal glomerulosclerosis      NSTEMI (non-ST elevated myocardial infarction) (H) 6/17/2014     JULIANE (obstructive sleep apnea)     Doesn't use CPAP     Paracentral scotoma     LE     Secondary renal hyperparathyroidism (H)      Squamous cell carcinoma (H)      PSH:   Past Surgical History:   Procedure Laterality Date     APPENDECTOMY       CATARACT IOL, RT/LT  4/19/2000    RE     CATARACT IOL, RT/LT  6/1/2000    LE     COLECTOMY SUBTOTAL  1983    10 cm, diverticulitis     COLONOSCOPY  2/13/2012    Procedure:COLONOSCOPY; Surgeon:SLOAN GALLARDO; Location:[facility]     ESOPHAGOSCOPY, GASTROSCOPY, DUODENOSCOPY (EGD), COMBINED  2/13/2012    Procedure:COMBINED ESOPHAGOSCOPY, GASTROSCOPY, DUODENOSCOPY (EGD); Surgeon:SLOAN GALLARDO; Location:[facility]     EXTRACAPSULAR CATARACT EXTRATION WITH INTRAOCULAR LENS IMPLANT  4-20-10, 6-1-10    Rt, Lt     HERNIA REPAIR  1995    Lt inguinal     HIP SURGERY      1981, bilat MITZI, revised 2001, 2005     KIDNEY SURGERY  1978 and 1993    transplant     KNEE SURGERY  1983, 1987     bilat TKA     MOHS MICROGRAPHIC PROCEDURE       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, auxiliary spleen     TONSILLECTOMY       Family Hx:   Family History   Problem Relation Age of Onset     Cardiovascular Father      AI with valve repair     Hypertension Father      KIDNEY DISEASE Father      CANCER Paternal Grandmother      ovarian ca     CEREBROVASCULAR DISEASE Paternal Grandfather      CEREBROVASCULAR DISEASE Maternal Grandfather      KIDNEY DISEASE Paternal Aunt      Skin Cancer No family hx of      Glaucoma No family hx of      Macular Degeneration No family hx of      Amblyopia No family hx of      Personal Hx:   Social History     Social History     Marital status:      Spouse name: N/A     Number of children: 0     Years of education: N/A     Occupational History      Disabled      [employer]     Social History Main Topics     Smoking status: Former Smoker     Packs/day: 1.00     Years: 9.00     Quit date: 1/1/1988     Smokeless tobacco: Former User     Alcohol use 0.0 oz/week     0 Standard drinks or equivalent per week      Comment: rarely 1 drink per month     Drug use: No     Sexual activity: Not on file     Other Topics Concern     Special Diet No     Exercise Yes     walks     Social History Narrative     Allergies:  Allergies   Allergen Reactions     Penicillins Shortness Of Breath and Hives     Contrast Dye Nausea and Vomiting     Keflex [Cephalexin Hcl] Other (See Comments)     Pt could not recall reaction     Tetracycline Other (See Comments)     Patient could not recall reaction     Sulfa Drugs Rash     Medications:  Prior to Admission medications    Medication Sig Start Date End Date Taking? Authorizing Provider   NEORAL 25 MG PO CAPSULE Take 2 capsules (50 mg) by mouth 2 times daily 6/1/17  Yes Nolan Lovett MD   azaTHIOprine (IMURAN) 50 MG tablet Take 1 tablet (50 mg) by mouth daily 4/27/17  Yes Nolan Lovett MD   predniSONE (DELTASONE) 5 MG tablet Take 1  tablet (5 mg) by mouth daily 3/27/17  Yes Nolan Lovett MD   hydrocortisone 2.5 % ointment Apply topically 2 times daily 3/15/17  Yes Foster De Guzman MD   entecavir (BARACLUDE) 0.5 MG tablet Take 1 tablet (0.5 mg) by mouth daily 12/29/16  Yes Shannon Cruz PA-C   latanoprost (XALATAN) 0.005 % ophthalmic solution Place 1 drop into both eyes At Bedtime 11/14/16  Yes Viki Rizzo MD   losartan (COZAAR) 50 MG tablet Take 1 tablet (50 mg) by mouth daily 11/1/16  Yes Aston Perry MD   clopidogrel (PLAVIX) 75 MG tablet Take 1 tablet (75 mg) by mouth daily 9/19/16  Yes Neeraj Atwood MD   isosorbide mononitrate (IMDUR) 30 MG 24 hr tablet Take 0.5 tablets (15 mg) by mouth daily 6/14/16  Yes Neeraj Atwood MD   amLODIPine (NORVASC) 5 MG tablet Take 1 tablet (5 mg) by mouth daily 6/14/16  Yes Neeraj Atwood MD   ibuprofen (ADVIL,MOTRIN) 200 MG tablet Take 200 mg by mouth every 4 hours as needed for mild pain   Yes Reported, Patient   atorvastatin (LIPITOR) 20 MG tablet Take 1 tablet (20 mg) by mouth daily You are rox to see your Cardiologist call [phone] to schedule. 12/1/15  Yes Leon Snow MD   FLUoxetine HCl (PROZAC PO) Take 20 mg by mouth   Yes Reported, Patient   BETA BLOCKER NOT PRESCRIBED, INTENTIONAL, Beta Blocker not prescribed intentionally due to Bradycardia < 50 bpm without beta blocker therapy 6/18/14  Yes Carolyn Espinoza PA-C   aspirin 81 MG tablet Take by mouth daily   Yes Reported, Patient   Omega-3 Fatty Acids (OMEGA 3 PO) Take 1 capsule by mouth daily   Yes Reported, Patient   omeprazole (PRILOSEC OTC) 20 MG tablet Take  by mouth daily. 6/11/13  Yes Aston Perry MD   calcium citrate-vitamin D (CITRACAL) 315-200 MG-UNIT TABS Take 1 tablet by mouth daily.   Yes Reported, Patient   Psyllium (METAMUCIL PO) Take 2 teaspoonful by mouth daily    Yes Reported, Patient   Multiple Vitamins-Iron (MULTIVITAMIN/IRON  "PO) Take 1 tablet by mouth daily    Yes Reported, Patient   acetaminophen (TYLENOL) 325 MG tablet Take 1-2 tablets by mouth every 6 hours as needed.   Yes Reported, Patient     Vitals:  /71  Pulse 61  Ht 1.702 m (5' 7\")  Wt 85.5 kg (188 lb 6.4 oz)  SpO2 97%  BMI 29.51 kg/m2    Exam:   GENERAL APPEARANCE: alert and no distress  HENT: mouth without ulcers or lesions  LYMPHATICS: no cervical or supraclavicular nodes  RESP: lungs clear to auscultation - no rales, rhonchi or wheezes  CV: regular rhythm, normal rate, no rub, no murmur  EDEMA: no LE edema bilaterally  ABDOMEN: soft, nondistended, nontender, bowel sounds normal  MS: extremities normal - no gross deformities noted, no evidence of inflammation in joints, no muscle tenderness  SKIN: no rash, actinic keratoses  TX KIDNEY: normal    Results:  Recent Results (from the past 2688 hour(s))   INR    Collection Time: 03/10/17 10:14 AM   Result Value Ref Range    INR 0.97 0.86 - 1.14   Basic metabolic panel    Collection Time: 03/10/17 10:14 AM   Result Value Ref Range    Sodium 143 133 - 144 mmol/L    Potassium 4.4 3.4 - 5.3 mmol/L    Chloride 108 94 - 109 mmol/L    Carbon Dioxide 26 20 - 32 mmol/L    Anion Gap 9 3 - 14 mmol/L    Glucose 89 70 - 99 mg/dL    Urea Nitrogen 14 7 - 30 mg/dL    Creatinine 0.96 0.66 - 1.25 mg/dL    GFR Estimate 82 >60 mL/min/1.7m2    GFR Estimate If Black >90   GFR Calc   >60 mL/min/1.7m2    Calcium 9.2 8.5 - 10.1 mg/dL   CBC with platelets    Collection Time: 03/10/17 10:14 AM   Result Value Ref Range    WBC 8.2 4.0 - 11.0 10e9/L    RBC Count 4.61 4.4 - 5.9 10e12/L    Hemoglobin 13.5 13.3 - 17.7 g/dL    Hematocrit 42.4 40.0 - 53.0 %    MCV 92 78 - 100 fl    MCH 29.3 26.5 - 33.0 pg    MCHC 31.8 31.5 - 36.5 g/dL    RDW 16.5 (H) 10.0 - 15.0 %    Platelet Count 346 150 - 450 10e9/L   Hepatic panel    Collection Time: 03/10/17 10:14 AM   Result Value Ref Range    Bilirubin Direct 0.3 (H) 0.0 - 0.2 mg/dL    Bilirubin " Total 0.9 0.2 - 1.3 mg/dL    Albumin 3.5 3.4 - 5.0 g/dL    Protein Total 7.3 6.8 - 8.8 g/dL    Alkaline Phosphatase 102 40 - 150 U/L    ALT 21 0 - 70 U/L    AST 22 0 - 45 U/L   Hep B Virus DNA Quant Real Time PCR    Collection Time: 03/10/17 10:14 AM   Result Value Ref Range    HEP B Virus DNA Quant (A) HBVND [IU]/mL     <20  HBV DNA is detected, less than 20 HBV DNA IU/mL   The [employer] AmpliPrep/CECILE TaqMan HBV Test is an FDA-approved in vitro nucleic   acid amplification test for the quantitation of HBV DNA in human plasma (EDTA   plasma) or serum using the [employer] AmpliPrep Instrument for automated viral   nucleic acid extraction and the [employer] TaqMan Analyzer or Anafocus TaqMan for the   automated Real Time PCR amplification and detection of viral nucleic acid   target.   Titer results are reported in International Units/mL (IU/mL) using the 1st WHO   International standard for HBV for Nucleic Acid Amplification based assays.      HEP B Virus DNA Quant Log <1.3 <1.3 [Log_IU]/mL       Again, thank you for allowing me to participate in the care of your patient.      Sincerely,    Nolan Lovett MD

## 2017-06-12 NOTE — LETTER
6/12/2017      RE: Jerad Ross  [address]     [facility] [address]       Assessment and Plan:   1. DDKT - baseline Cr ~ 0.8-1.1, which has remained stable.  With ongoing skin cancers, discussed possible immunosuppression changes.  Will change patient off azathioprine to mycophenolate mofetil 500 mg bid.  If tolerated, would taper off cyclosporine and increase mycophenolate mofetil to 750 mg bid.  Patient will remain on prednisone 5 mg daily.  2. HTN - well controlled at target of less than 140/90.  No changes.  3. CAD - asymptomatic.  Recommend continue exercise.  4. Chronic pain - recommend avoiding ibuprofen and NSAIDs if possible.  5. Chronic hepatitis B - undetectable hepatitis B DNA PCR.  Patient to continue close follow up with Hepatology.  6. Skin cancer - no new skin lesions.  Discussed immunosuppression change as noted above.  7. Recommend return visit in 12 months.    Assessment and plan was discussed with patient and he voiced his understanding and agreement.    Consult:  Jerad Ross was seen in consultation at the request of Dr. Perry for kidney transplant and immunosuppression management.    Reason for Visit:  Mr. Ross is here for transplant and immunosuppression management.    HPI:  Mr. 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         Transplant Hx:       Tx: DDKT  Date: 10/6/93       Present Maintenance IS: Cyclosporine, Azathioprine and Prednisone       Baseline Creatinine: 0.8-1.1    Mr. Ross reports feeling okay overall with some medical complaints.  He has a h/o FSGS dx 1970, but not sure what treatments he underwent other than prednisone.  Patient had progressive decline in kidney function and was initiated on HD in 1976.  Patient then underwent DDKT 4/18/78, which was complicated by early acute cellular-mediated rejection treated with steroids and ALG.  That kidney lasted until 1991 when he returned to dialysis.  " Patient is now status post DDKT 10/6/93.  He has done well with this transplant with no history of acute rejection and no kidney transplant biopsy with baseline creatinine ~ 0.8-1.1 on cyclosporine, azathioprine and prednisone.  His post transplant course has been complicated by CAD with h/o NSTEMI in 2014, as well as recurrent skin cancers.  In addition, due to his chronic steroid use, he has bilateral AVN of both hips and is status post bilateral hip replacements.  Patient continues to have chronic joint pain and takes ibuprofen almost daily.    His energy level has been okay to maybe a bit more tired lately and not quite normal.  He is active and does get some exercise, mostly with walking.  Denies any chest pain or shortness of breath with exertion.  Appetite is \"too good,\" but weight has been stable.  No nausea, vomiting or diarrhea.  No fever, sweats or chills.  No leg swelling.  No problems emptying his bladder.    Home BP: Not checked      ROS:  A comprehensive review of systems was obtained and negative, except as noted in the HPI or PMH.    Active Medical Problems:  Patient Active Problem List   Diagnosis     BCC (basal cell carcinoma), trunk     JULIANE (obstructive sleep apnea)     Hypertension secondary to other renal disorders     CAD (coronary artery disease)     NSTEMI (non-ST elevated myocardial infarction) (H)     Depression     Diverticulosis     Hemorrhoids     Kidney replaced by transplant     Basal cell carcinoma     Squamous cell carcinoma (H)     Dyslipidemia     CRP elevated     Glaucoma     AION (acute ischemic optic neuropathy)     Paracentral scotoma     Hip pain     Inflamed seborrheic keratosis     Intertrigo     Chronic hepatitis B (H)     Immunosuppressed status (H)     Hypogonadism in male     GERD (gastroesophageal reflux disease)     Aftercare following organ transplant     PMH:   Medical record was reviewed and PMH was discussed with patient and noted below.  Past Medical History: "   Diagnosis Date     AION (acute ischemic optic neuropathy)      Anemia in chronic renal disease      Avascular necrosis of bones of both hips (H)     s/p bilateral hip replacements     Basal cell carcinoma      CAD (coronary artery disease) 6/17/2014     Chronic hepatitis B (H)      Clostridium difficile colitis      CRP elevated      Depression      Diverticulosis      Dyslipidemia      FSGS (focal segmental glomerulosclerosis)      Gastric ulcer with hemorrhage 2/12/12     GERD (gastroesophageal reflux disease)      Glaucoma     OHTN     Hemorrhoids      Hypertension secondary to other renal disorders      Hypogonadism in male      Immunosuppressed status (H)      Kidney replaced by transplant     focal glomerulosclerosis      NSTEMI (non-ST elevated myocardial infarction) (H) 6/17/2014     JULIANE (obstructive sleep apnea)     Doesn't use CPAP     Paracentral scotoma     LE     Secondary renal hyperparathyroidism (H)      Squamous cell carcinoma (H)      PSH:   Past Surgical History:   Procedure Laterality Date     APPENDECTOMY       CATARACT IOL, RT/LT  4/19/2000    RE     CATARACT IOL, RT/LT  6/1/2000    LE     COLECTOMY SUBTOTAL  1983    10 cm, diverticulitis     COLONOSCOPY  2/13/2012    Procedure:COLONOSCOPY; Surgeon:SLOAN GALLARDO; [facility]     ESOPHAGOSCOPY, GASTROSCOPY, DUODENOSCOPY (EGD), COMBINED  2/13/2012    Procedure:COMBINED ESOPHAGOSCOPY, GASTROSCOPY, DUODENOSCOPY (EGD); Surgeon:SLOAN GALLARDO; Location[facility]     EXTRACAPSULAR CATARACT EXTRATION WITH INTRAOCULAR LENS IMPLANT  4-20-10, 6-1-10    Rt, Lt     HERNIA REPAIR  1995    Lt inguinal     HIP SURGERY      1981, bilat MITZI, revised 2001, 2005     KIDNEY SURGERY  1978 and 1993    transplant     KNEE SURGERY  1983, 1987    bilat TKA     MOHS MICROGRAPHIC PROCEDURE       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, auxiliary spleen     TONSILLECTOMY       Family Hx:   Family History   Problem Relation Age of Onset      Cardiovascular Father      AI with valve repair     Hypertension Father      KIDNEY DISEASE Father      CANCER Paternal Grandmother      ovarian ca     CEREBROVASCULAR DISEASE Paternal Grandfather      CEREBROVASCULAR DISEASE Maternal Grandfather      KIDNEY DISEASE Paternal Aunt      Skin Cancer No family hx of      Glaucoma No family hx of      Macular Degeneration No family hx of      Amblyopia No family hx of      Personal Hx:   Social History     Social History     Marital status:      Spouse name: N/A     Number of children: 0     Years of education: N/A     Occupational History      Disabled      [employer]     Social History Main Topics     Smoking status: Former Smoker     Packs/day: 1.00     Years: 9.00     Quit date: 1/1/1988     Smokeless tobacco: Former User     Alcohol use 0.0 oz/week     0 Standard drinks or equivalent per week      Comment: rarely 1 drink per month     Drug use: No     Sexual activity: Not on file     Other Topics Concern     Special Diet No     Exercise Yes     walks     Social History Narrative     Allergies:  Allergies   Allergen Reactions     Penicillins Shortness Of Breath and Hives     Contrast Dye Nausea and Vomiting     Keflex [Cephalexin Hcl] Other (See Comments)     Pt could not recall reaction     Tetracycline Other (See Comments)     Patient could not recall reaction     Sulfa Drugs Rash     Medications:  Prior to Admission medications    Medication Sig Start Date End Date Taking? Authorizing Provider   NEORAL 25 MG PO CAPSULE Take 2 capsules (50 mg) by mouth 2 times daily 6/1/17  Yes Nolan Lovett MD   azaTHIOprine (IMURAN) 50 MG tablet Take 1 tablet (50 mg) by mouth daily 4/27/17  Yes Nolan Lovett MD   predniSONE (DELTASONE) 5 MG tablet Take 1 tablet (5 mg) by mouth daily 3/27/17  Yes Nolan Lovett MD   hydrocortisone 2.5 % ointment Apply topically 2 times daily 3/15/17  Yes Foster De Guzman MD   entecavir  (BARACLUDE) 0.5 MG tablet Take 1 tablet (0.5 mg) by mouth daily 12/29/16  Yes Shannon Cruz PA-C   latanoprost (XALATAN) 0.005 % ophthalmic solution Place 1 drop into both eyes At Bedtime 11/14/16  Yes Viki Rizzo MD   losartan (COZAAR) 50 MG tablet Take 1 tablet (50 mg) by mouth daily 11/1/16  Yes Aston Perry MD   clopidogrel (PLAVIX) 75 MG tablet Take 1 tablet (75 mg) by mouth daily 9/19/16  Yes Neeraj Atwood MD   isosorbide mononitrate (IMDUR) 30 MG 24 hr tablet Take 0.5 tablets (15 mg) by mouth daily 6/14/16  Yes Neeraj Atwood MD   amLODIPine (NORVASC) 5 MG tablet Take 1 tablet (5 mg) by mouth daily 6/14/16  Yes Neeraj Atwood MD   ibuprofen (ADVIL,MOTRIN) 200 MG tablet Take 200 mg by mouth every 4 hours as needed for mild pain   Yes Reported, Patient   atorvastatin (LIPITOR) 20 MG tablet Take 1 tablet (20 mg) by mouth daily You are rox to see your Cardiologist call [phone] to schedule. 12/1/15  Yes Leon Snow MD   FLUoxetine HCl (PROZAC PO) Take 20 mg by mouth   Yes Reported, Patient   BETA BLOCKER NOT PRESCRIBED, INTENTIONAL, Beta Blocker not prescribed intentionally due to Bradycardia < 50 bpm without beta blocker therapy 6/18/14  Yes Carolyn Espinoza PA-C   aspirin 81 MG tablet Take by mouth daily   Yes Reported, Patient   Omega-3 Fatty Acids (OMEGA 3 PO) Take 1 capsule by mouth daily   Yes Reported, Patient   omeprazole (PRILOSEC OTC) 20 MG tablet Take  by mouth daily. 6/11/13  Yes Aston Perry MD   calcium citrate-vitamin D (CITRACAL) 315-200 MG-UNIT TABS Take 1 tablet by mouth daily.   Yes Reported, Patient   Psyllium (METAMUCIL PO) Take 2 teaspoonful by mouth daily    Yes Reported, Patient   Multiple Vitamins-Iron (MULTIVITAMIN/IRON PO) Take 1 tablet by mouth daily    Yes Reported, Patient   acetaminophen (TYLENOL) 325 MG tablet Take 1-2 tablets by mouth every 6 hours as needed.   Yes Reported, Patient  "    Vitals:  /71  Pulse 61  Ht 1.702 m (5' 7\")  Wt 85.5 kg (188 lb 6.4 oz)  SpO2 97%  BMI 29.51 kg/m2    Exam:   GENERAL APPEARANCE: alert and no distress  HENT: mouth without ulcers or lesions  LYMPHATICS: no cervical or supraclavicular nodes  RESP: lungs clear to auscultation - no rales, rhonchi or wheezes  CV: regular rhythm, normal rate, no rub, no murmur  EDEMA: no LE edema bilaterally  ABDOMEN: soft, nondistended, nontender, bowel sounds normal  MS: extremities normal - no gross deformities noted, no evidence of inflammation in joints, no muscle tenderness  SKIN: no rash, actinic keratoses  TX KIDNEY: normal    Results:  Recent Results (from the past 2688 hour(s))   INR    Collection Time: 03/10/17 10:14 AM   Result Value Ref Range    INR 0.97 0.86 - 1.14   Basic metabolic panel    Collection Time: 03/10/17 10:14 AM   Result Value Ref Range    Sodium 143 133 - 144 mmol/L    Potassium 4.4 3.4 - 5.3 mmol/L    Chloride 108 94 - 109 mmol/L    Carbon Dioxide 26 20 - 32 mmol/L    Anion Gap 9 3 - 14 mmol/L    Glucose 89 70 - 99 mg/dL    Urea Nitrogen 14 7 - 30 mg/dL    Creatinine 0.96 0.66 - 1.25 mg/dL    GFR Estimate 82 >60 mL/min/1.7m2    GFR Estimate If Black >90   GFR Calc   >60 mL/min/1.7m2    Calcium 9.2 8.5 - 10.1 mg/dL   CBC with platelets    Collection Time: 03/10/17 10:14 AM   Result Value Ref Range    WBC 8.2 4.0 - 11.0 10e9/L    RBC Count 4.61 4.4 - 5.9 10e12/L    Hemoglobin 13.5 13.3 - 17.7 g/dL    Hematocrit 42.4 40.0 - 53.0 %    MCV 92 78 - 100 fl    MCH 29.3 26.5 - 33.0 pg    MCHC 31.8 31.5 - 36.5 g/dL    RDW 16.5 (H) 10.0 - 15.0 %    Platelet Count 346 150 - 450 10e9/L   Hepatic panel    Collection Time: 03/10/17 10:14 AM   Result Value Ref Range    Bilirubin Direct 0.3 (H) 0.0 - 0.2 mg/dL    Bilirubin Total 0.9 0.2 - 1.3 mg/dL    Albumin 3.5 3.4 - 5.0 g/dL    Protein Total 7.3 6.8 - 8.8 g/dL    Alkaline Phosphatase 102 40 - 150 U/L    ALT 21 0 - 70 U/L    AST 22 0 - 45 U/L "   Hep B Virus DNA Quant Real Time PCR    Collection Time: 03/10/17 10:14 AM   Result Value Ref Range    HEP B Virus DNA Quant (A) HBVND [IU]/mL     <20  HBV DNA is detected, less than 20 HBV DNA IU/mL   The [employer] AmpliPrep/CECILE TaqMan HBV Test is an FDA-approved in vitro nucleic   acid amplification test for the quantitation of HBV DNA in human plasma (EDTA   plasma) or serum using the [employer] AmpliPrep Instrument for automated viral   nucleic acid extraction and the [employer] TaqMan Analyzer or [employer] TaqMan for the   automated Real Time PCR amplification and detection of viral nucleic acid   target.   Titer results are reported in International Units/mL (IU/mL) using the 1st WHO   International standard for HBV for Nucleic Acid Amplification based assays.      HEP B Virus DNA Quant Log <1.3 <1.3 [Log_IU]/mL       Nolan Lovett MD

## 2017-06-12 NOTE — NURSING NOTE
"Chief Complaint   Patient presents with     RECHECK     Post kidney Tx follow up        Initial /71  Pulse 61  Ht 1.702 m (5' 7\")  Wt 85.5 kg (188 lb 6.4 oz)  SpO2 97%  BMI 29.51 kg/m2 Estimated body mass index is 29.51 kg/(m^2) as calculated from the following:    Height as of this encounter: 1.702 m (5' 7\").    Weight as of this encounter: 85.5 kg (188 lb 6.4 oz).  Medication Reconciliation: complete     The following encounter information was actually performed by Lucy Evans CMA and entered later due to system downtime.   Lucy Evans CMA   "

## 2017-06-12 NOTE — MR AVS SNAPSHOT
After Visit Summary   6/12/2017    Jerad Ross    MRN: [id number]           Patient Information     Date Of Birth          1960        Visit Information        Provider Department      6/12/2017 2:20 PM Nolan Lovett MD [facility] Nephrology        Today's Diagnoses     Kidney replaced by transplant    -  1    Aftercare following organ transplant        Immunosuppressed status (H)        Hypertension secondary to other renal disorders        Pain of both hip joints           Follow-ups after your visit        Follow-up notes from your care team     Return in about 1 year (around 6/12/2018).      Your next 10 appointments already scheduled     Sep 08, 2017 10:15 AM CDT   LAB with  LAB   [facility] Lab ([facility])    [address] [facility] [id number] [phone]           Patient must bring picture ID.  Patient should be prepared to give a urine specimen  Please do not eat 10-12 hours before your appointment if you are coming in fasting for labs on lipids, cholesterol, or glucose (sugar).  Pregnant women should follow their Care Team instructions. Water with medications is okay. Do not drink coffee or other fluids.   If you have concerns about taking  your medications, please ask at office or if scheduling via [employer], send a message by clicking on Secure Messaging, Message Your Care Team.            Sep 12, 2017 10:10 AM CDT   (Arrive by 9:55 AM)   New General Liver with Lina Claros MD   [facility] Hepatology ([facility])    [address] [facility] [id number] [phone]            Nov 06, 2017  8:45 AM CST   VISUAL FIELD with [facility] EYE VISUAL FIELD   Eye Clinic ([facility])    [address]  [address]  [address] [address]  [facility] [address]   [phone]            Nov 06, 2017  9:15 AM CST   RETURN GLAUCOMA with Viki Rizzo MD   Eye  "Clinic ([facility])    [address]  [address]  [address]  [facility] [id number] [phone]            Jun 12, 2018  1:30 PM CDT   (Arrive by 1:00 PM)   Return Kidney Transplant with Nolan Lovett MD   [facility] Nephrology ([facility])    [address]  [address] Floor  [place] [facility] [address] [phone]              Who to contact     If you have questions or need follow up information about today's clinic visit or your schedule please contact [facility] NEPHROLOGY directly at [phone].  Normal or non-critical lab and imaging results will be communicated to you by [employer], letter or phone within 4 business days after the clinic has received the results. If you do not hear from us within 7 days, please contact the clinic through [employer] or phone. If you have a critical or abnormal lab result, we will notify you by phone as soon as possible.  Submit refill requests through [employer] or call your pharmacy and they will forward the refill request to us. Please allow 3 business days for your refill to be completed.          Additional Information About Your Visit        [employer] Information     [employer] gives you secure access to your electronic health record. If you see a primary care provider, you can also send messages to your care team and make appointments. If you have questions, please call your primary care clinic.  If you do not have a primary care provider, please call [phone] and they will assist you.        Care EveryWhere ID     This is your Care EveryWhere ID. This could be used by other organizations to access your [place] medical records  [id number]        Your Vitals Were     Pulse Height Pulse Oximetry BMI (Body Mass Index)          61 1.702 m (5' 7\") 97% 29.51 kg/m2         Blood Pressure from Last 3 Encounters:   06/12/17 106/71   03/15/17 124/84   08/31/16 116/79    Weight from Last 3 Encounters:   06/12/17 85.5 kg " (188 lb 6.4 oz)   03/15/17 86.8 kg (191 lb 6.4 oz)   08/31/16 82.3 kg (181 lb 6.4 oz)              Today, you had the following     No orders found for display       Primary Care Provider Office Phone # Fax #    Aston Perry [phone]        [facility] [address]  [facility] [address]        Thank you!     Thank you for choosing [facility] NEPHROLOGY  for your care. Our goal is always to provide you with excellent care. Hearing back from our patients is one way we can continue to improve our services. Please take a few minutes to complete the written survey that you may receive in the mail after your visit with us. Thank you!             Your Updated Medication List - Protect others around you: Learn how to safely use, store and throw away your medicines at www.disposemymeds.org.          This list is accurate as of: 6/12/17 11:59 PM.  Always use your most recent med list.                   Brand Name Dispense Instructions for use    amLODIPine 5 MG tablet    NORVASC    90 tablet    Take 1 tablet (5 mg) by mouth daily       aspirin 81 MG tablet      Take by mouth daily       atorvastatin 20 MG tablet    LIPITOR    90 tablet    Take 1 tablet (20 mg) by mouth daily You are rox to see your Cardiologist call [phone] to schedule.       azaTHIOprine 50 MG tablet    IMURAN    30 tablet    Take 1 tablet (50 mg) by mouth daily       BETA BLOCKER NOT PRESCRIBED (INTENTIONAL)      Beta Blocker not prescribed intentionally due to Bradycardia < 50 bpm without beta blocker therapy       calcium citrate-vitamin D 315-200 MG-UNIT Tabs per tablet    CITRACAL     Take 1 tablet by mouth daily.       clopidogrel 75 MG tablet    PLAVIX    90 tablet    Take 1 tablet (75 mg) by mouth daily       cycloSPORINE modified capsule     120 capsule    Take 2 capsules (50 mg) by mouth 2 times daily       entecavir 0.5 MG tablet    BARACLUDE    90 tablet    Take 1 tablet (0.5 mg) by mouth daily        hydrocortisone 2.5 % ointment     28.35 g    Apply topically 2 times daily       ibuprofen 200 MG tablet    ADVIL/MOTRIN     Take 200 mg by mouth every 4 hours as needed for mild pain       isosorbide mononitrate 30 MG 24 hr tablet    IMDUR    45 tablet    Take 0.5 tablets (15 mg) by mouth daily       latanoprost 0.005 % ophthalmic solution    XALATAN    3 Bottle    Place 1 drop into both eyes At Bedtime       losartan 50 MG tablet    COZAAR    90 tablet    Take 1 tablet (50 mg) by mouth daily       METAMUCIL PO      Take 2 teaspoonful by mouth daily       MULTIVITAMIN/IRON PO      Take 1 tablet by mouth daily       OMEGA 3 PO      Take 1 capsule by mouth daily       omeprazole 20 MG tablet    priLOSEC OTC    30 tablet    Take  by mouth daily.       predniSONE 5 MG tablet    DELTASONE    90 tablet    Take 1 tablet (5 mg) by mouth daily       PROZAC PO      Take 20 mg by mouth       TYLENOL 325 MG tablet   Generic drug:  acetaminophen      Take 1-2 tablets by mouth every 6 hours as needed.

## 2017-06-15 DIAGNOSIS — R07.9 ACUTE CHEST PAIN: ICD-10-CM

## 2017-06-15 RX ORDER — ISOSORBIDE MONONITRATE 30 MG/1
15 TABLET, EXTENDED RELEASE ORAL DAILY
Qty: 45 TABLET | Refills: 3 | Status: CANCELLED | OUTPATIENT
Start: 2017-06-15

## 2017-06-19 DIAGNOSIS — Z94.0 KIDNEY TRANSPLANTED: Primary | ICD-10-CM

## 2017-06-19 PROBLEM — Z48.298 AFTERCARE FOLLOWING ORGAN TRANSPLANT: Status: ACTIVE | Noted: 2017-06-19

## 2017-06-19 RX ORDER — MYCOPHENOLATE MOFETIL 250 MG/1
500 CAPSULE ORAL 2 TIMES DAILY
Qty: 120 CAPSULE | Refills: 11 | Status: SHIPPED | OUTPATIENT
Start: 2017-06-19 | End: 2017-07-24

## 2017-06-19 NOTE — PROGRESS NOTES
Assessment and Plan:   1. DDKT - baseline Cr ~ 0.8-1.1, which has remained stable.  With ongoing skin cancers, discussed possible immunosuppression changes.  Will change patient off azathioprine to mycophenolate mofetil 500 mg bid.  If tolerated, would taper off cyclosporine and increase mycophenolate mofetil to 750 mg bid.  Patient will remain on prednisone 5 mg daily.  2. HTN - well controlled at target of less than 140/90.  No changes.  3. CAD - asymptomatic.  Recommend continue exercise.  4. Chronic pain - recommend avoiding ibuprofen and NSAIDs if possible.  5. Chronic hepatitis B - undetectable hepatitis B DNA PCR.  Patient to continue close follow up with Hepatology.  6. Skin cancer - no new skin lesions.  Discussed immunosuppression change as noted above.  7. Recommend return visit in 12 months.    Assessment and plan was discussed with patient and he voiced his understanding and agreement.    Consult:  Jerad Ross was seen in consultation at the request of Dr. Perry for kidney transplant and immunosuppression management.    Reason for Visit:  Mr. Ross is here for transplant and immunosuppression management.    HPI:  Mr. 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         Transplant Hx:       Tx: DDKT  Date: 10/6/93       Present Maintenance IS: Cyclosporine, Azathioprine and Prednisone       Baseline Creatinine: 0.8-1.1    Mr. Ross reports feeling okay overall with some medical complaints.  He has a h/o FSGS dx 1970, but not sure what treatments he underwent other than prednisone.  Patient had progressive decline in kidney function and was initiated on HD in 1976.  Patient then underwent DDKT 4/18/78, which was complicated by early acute cellular-mediated rejection treated with steroids and ALG.  That kidney lasted until 1991 when he returned to dialysis.  Patient is now status post DDKT 10/6/93.  He has done well with this transplant with no history of  "acute rejection and no kidney transplant biopsy with baseline creatinine ~ 0.8-1.1 on cyclosporine, azathioprine and prednisone.  His post transplant course has been complicated by CAD with h/o NSTEMI in 2014, as well as recurrent skin cancers.  In addition, due to his chronic steroid use, he has bilateral AVN of both hips and is status post bilateral hip replacements.  Patient continues to have chronic joint pain and takes ibuprofen almost daily.    His energy level has been okay to maybe a bit more tired lately and not quite normal.  He is active and does get some exercise, mostly with walking.  Denies any chest pain or shortness of breath with exertion.  Appetite is \"too good,\" but weight has been stable.  No nausea, vomiting or diarrhea.  No fever, sweats or chills.  No leg swelling.  No problems emptying his bladder.    Home BP: Not checked      ROS:  A comprehensive review of systems was obtained and negative, except as noted in the HPI or PMH.    Active Medical Problems:  Patient Active Problem List   Diagnosis     BCC (basal cell carcinoma), trunk     JULIANE (obstructive sleep apnea)     Hypertension secondary to other renal disorders     CAD (coronary artery disease)     NSTEMI (non-ST elevated myocardial infarction) (H)     Depression     Diverticulosis     Hemorrhoids     Kidney replaced by transplant     Basal cell carcinoma     Squamous cell carcinoma (H)     Dyslipidemia     CRP elevated     Glaucoma     AION (acute ischemic optic neuropathy)     Paracentral scotoma     Hip pain     Inflamed seborrheic keratosis     Intertrigo     Chronic hepatitis B (H)     Immunosuppressed status (H)     Hypogonadism in male     GERD (gastroesophageal reflux disease)     Aftercare following organ transplant     PMH:   Medical record was reviewed and PMH was discussed with patient and noted below.  Past Medical History:   Diagnosis Date     AION (acute ischemic optic neuropathy)      Anemia in chronic renal disease      " Avascular necrosis of bones of both hips (H)     s/p bilateral hip replacements     Basal cell carcinoma      CAD (coronary artery disease) 6/17/2014     Chronic hepatitis B (H)      Clostridium difficile colitis      CRP elevated      Depression      Diverticulosis      Dyslipidemia      FSGS (focal segmental glomerulosclerosis)      Gastric ulcer with hemorrhage 2/12/12     GERD (gastroesophageal reflux disease)      Glaucoma     OHTN     Hemorrhoids      Hypertension secondary to other renal disorders      Hypogonadism in male      Immunosuppressed status (H)      Kidney replaced by transplant     focal glomerulosclerosis      NSTEMI (non-ST elevated myocardial infarction) (H) 6/17/2014     JULIANE (obstructive sleep apnea)     Doesn't use CPAP     Paracentral scotoma     LE     Secondary renal hyperparathyroidism (H)      Squamous cell carcinoma (H)      PSH:   Past Surgical History:   Procedure Laterality Date     APPENDECTOMY       CATARACT IOL, RT/LT  4/19/2000    RE     CATARACT IOL, RT/LT  6/1/2000    LE     COLECTOMY SUBTOTAL  1983    10 cm, diverticulitis     COLONOSCOPY  2/13/2012    Procedure:COLONOSCOPY; Surgeon:SLOAN GALLARDO; [facility]     ESOPHAGOSCOPY, GASTROSCOPY, DUODENOSCOPY (EGD), COMBINED  2/13/2012    Procedure:COMBINED ESOPHAGOSCOPY, GASTROSCOPY, DUODENOSCOPY (EGD); Surgeon:SLOAN GALLARDO; Location:[facility]     EXTRACAPSULAR CATARACT EXTRATION WITH INTRAOCULAR LENS IMPLANT  4-20-10, 6-1-10    Rt, Lt     HERNIA REPAIR  1995    Lt inguinal     HIP SURGERY      1981, bilat MITZI, revised 2001, 2005     KIDNEY SURGERY  1978 and 1993    transplant     KNEE SURGERY  1983, 1987    bilat TKA     MOHS MICROGRAPHIC PROCEDURE       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, auxiliary spleen     TONSILLECTOMY       Family Hx:   Family History   Problem Relation Age of Onset     Cardiovascular Father      AI with valve repair     Hypertension Father      KIDNEY DISEASE Father      CANCER  Paternal Grandmother      ovarian ca     CEREBROVASCULAR DISEASE Paternal Grandfather      CEREBROVASCULAR DISEASE Maternal Grandfather      KIDNEY DISEASE Paternal Aunt      Skin Cancer No family hx of      Glaucoma No family hx of      Macular Degeneration No family hx of      Amblyopia No family hx of      Personal Hx:   Social History     Social History     Marital status:      Spouse name: N/A     Number of children: 0     Years of education: N/A     Occupational History      Disabled      [employer]     Social History Main Topics     Smoking status: Former Smoker     Packs/day: 1.00     Years: 9.00     Quit date: 1/1/1988     Smokeless tobacco: Former User     Alcohol use 0.0 oz/week     0 Standard drinks or equivalent per week      Comment: rarely 1 drink per month     Drug use: No     Sexual activity: Not on file     Other Topics Concern     Special Diet No     Exercise Yes     walks     Social History Narrative     Allergies:  Allergies   Allergen Reactions     Penicillins Shortness Of Breath and Hives     Contrast Dye Nausea and Vomiting     Keflex [Cephalexin Hcl] Other (See Comments)     Pt could not recall reaction     Tetracycline Other (See Comments)     Patient could not recall reaction     Sulfa Drugs Rash     Medications:  Prior to Admission medications    Medication Sig Start Date End Date Taking? Authorizing Provider   NEORAL 25 MG PO CAPSULE Take 2 capsules (50 mg) by mouth 2 times daily 6/1/17  Yes Nolan Lovett MD   azaTHIOprine (IMURAN) 50 MG tablet Take 1 tablet (50 mg) by mouth daily 4/27/17  Yes Nolan Lovett MD   predniSONE (DELTASONE) 5 MG tablet Take 1 tablet (5 mg) by mouth daily 3/27/17  Yes Nolan Lovett MD   hydrocortisone 2.5 % ointment Apply topically 2 times daily 3/15/17  Yes Foster De Guzman MD   entecavir (BARACLUDE) 0.5 MG tablet Take 1 tablet (0.5 mg) by mouth daily 12/29/16  Yes Shannon Cruz PA-C  "  latanoprost (XALATAN) 0.005 % ophthalmic solution Place 1 drop into both eyes At Bedtime 11/14/16  Yes Viki Rizzo MD   losartan (COZAAR) 50 MG tablet Take 1 tablet (50 mg) by mouth daily 11/1/16  Yes Aston Perry MD   clopidogrel (PLAVIX) 75 MG tablet Take 1 tablet (75 mg) by mouth daily 9/19/16  Yes Neeraj Atwood MD   isosorbide mononitrate (IMDUR) 30 MG 24 hr tablet Take 0.5 tablets (15 mg) by mouth daily 6/14/16  Yes Neeraj Atwood MD   amLODIPine (NORVASC) 5 MG tablet Take 1 tablet (5 mg) by mouth daily 6/14/16  Yes Neeraj Atwood MD   ibuprofen (ADVIL,MOTRIN) 200 MG tablet Take 200 mg by mouth every 4 hours as needed for mild pain   Yes Reported, Patient   atorvastatin (LIPITOR) 20 MG tablet Take 1 tablet (20 mg) by mouth daily You are rox to see your Cardiologist call [phone] to schedule. 12/1/15  Yes Leon Snow MD   FLUoxetine HCl (PROZAC PO) Take 20 mg by mouth   Yes Reported, Patient   BETA BLOCKER NOT PRESCRIBED, INTENTIONAL, Beta Blocker not prescribed intentionally due to Bradycardia < 50 bpm without beta blocker therapy 6/18/14  Yes Carolyn Espinoza PA-C   aspirin 81 MG tablet Take by mouth daily   Yes Reported, Patient   Omega-3 Fatty Acids (OMEGA 3 PO) Take 1 capsule by mouth daily   Yes Reported, Patient   omeprazole (PRILOSEC OTC) 20 MG tablet Take  by mouth daily. 6/11/13  Yes Aston Perry MD   calcium citrate-vitamin D (CITRACAL) 315-200 MG-UNIT TABS Take 1 tablet by mouth daily.   Yes Reported, Patient   Psyllium (METAMUCIL PO) Take 2 teaspoonful by mouth daily    Yes Reported, Patient   Multiple Vitamins-Iron (MULTIVITAMIN/IRON PO) Take 1 tablet by mouth daily    Yes Reported, Patient   acetaminophen (TYLENOL) 325 MG tablet Take 1-2 tablets by mouth every 6 hours as needed.   Yes Reported, Patient     Vitals:  /71  Pulse 61  Ht 1.702 m (5' 7\")  Wt 85.5 kg (188 lb 6.4 oz)  SpO2 97%  BMI 29.51 " kg/m2    Exam:   GENERAL APPEARANCE: alert and no distress  HENT: mouth without ulcers or lesions  LYMPHATICS: no cervical or supraclavicular nodes  RESP: lungs clear to auscultation - no rales, rhonchi or wheezes  CV: regular rhythm, normal rate, no rub, no murmur  EDEMA: no LE edema bilaterally  ABDOMEN: soft, nondistended, nontender, bowel sounds normal  MS: extremities normal - no gross deformities noted, no evidence of inflammation in joints, no muscle tenderness  SKIN: no rash, actinic keratoses  TX KIDNEY: normal    Results:  Recent Results (from the past 2688 hour(s))   INR    Collection Time: 03/10/17 10:14 AM   Result Value Ref Range    INR 0.97 0.86 - 1.14   Basic metabolic panel    Collection Time: 03/10/17 10:14 AM   Result Value Ref Range    Sodium 143 133 - 144 mmol/L    Potassium 4.4 3.4 - 5.3 mmol/L    Chloride 108 94 - 109 mmol/L    Carbon Dioxide 26 20 - 32 mmol/L    Anion Gap 9 3 - 14 mmol/L    Glucose 89 70 - 99 mg/dL    Urea Nitrogen 14 7 - 30 mg/dL    Creatinine 0.96 0.66 - 1.25 mg/dL    GFR Estimate 82 >60 mL/min/1.7m2    GFR Estimate If Black >90   GFR Calc   >60 mL/min/1.7m2    Calcium 9.2 8.5 - 10.1 mg/dL   CBC with platelets    Collection Time: 03/10/17 10:14 AM   Result Value Ref Range    WBC 8.2 4.0 - 11.0 10e9/L    RBC Count 4.61 4.4 - 5.9 10e12/L    Hemoglobin 13.5 13.3 - 17.7 g/dL    Hematocrit 42.4 40.0 - 53.0 %    MCV 92 78 - 100 fl    MCH 29.3 26.5 - 33.0 pg    MCHC 31.8 31.5 - 36.5 g/dL    RDW 16.5 (H) 10.0 - 15.0 %    Platelet Count 346 150 - 450 10e9/L   Hepatic panel    Collection Time: 03/10/17 10:14 AM   Result Value Ref Range    Bilirubin Direct 0.3 (H) 0.0 - 0.2 mg/dL    Bilirubin Total 0.9 0.2 - 1.3 mg/dL    Albumin 3.5 3.4 - 5.0 g/dL    Protein Total 7.3 6.8 - 8.8 g/dL    Alkaline Phosphatase 102 40 - 150 U/L    ALT 21 0 - 70 U/L    AST 22 0 - 45 U/L   Hep B Virus DNA Quant Real Time PCR    Collection Time: 03/10/17 10:14 AM   Result Value Ref Range     HEP B Virus DNA Quant (A) HBVND [IU]/mL     <20  HBV DNA is detected, less than 20 HBV DNA IU/mL   The CECILE AmpliPrep/CECILE TaqMan HBV Test is an FDA-approved in vitro nucleic   acid amplification test for the quantitation of HBV DNA in human plasma (EDTA   plasma) or serum using the CECILE AmpliPrep Instrument for automated viral   nucleic acid extraction and the CECILE TaqMan Analyzer or EnergyHub for the   automated Real Time PCR amplification and detection of viral nucleic acid   target.   Titer results are reported in International Units/mL (IU/mL) using the 1st WHO   International standard for HBV for Nucleic Acid Amplification based assays.      HEP B Virus DNA Quant Log <1.3 <1.3 [Log_IU]/mL

## 2017-06-19 NOTE — TELEPHONE ENCOUNTER
Message  Received: Today       Nolan Lovett MD Ututalum, Teresa, RN                   [facility],     Would like to change patient off azathioprine to mycophenolate mofetil 500 mg bid.  If tolerated after 2-4 weeks, can taper off cyclosporine and increase mycophenolate mofetil to 750 mg bid.  Continue on prednisone 5 mg daily.     See clinic note.     In addition, can you please obtain urine prot/cr with next labs.     Thanks.                PLAN:    Med Changes:    Switch AZA to Mycophenolate mofetil 500 mg BID. [facility] - [place], [place] - [address] AT [address]    Taper off CSA if switch tolerated after 2 - 4 weeks, then increase MMF to 750 BID.    Continue Prednisone 5 mg daily.    Labs:    Urine protein / creatinine ratio    No answer. Left VM re: plan recommended by Dr. Lovett.  Instructed to call back.      ADDENDUM:  Jerad called back and discussed the switch to Mycophenolate Mofetil 500 mg BID.  Verbalized understanding.  Prescription sent to Pharmacy of choice.

## 2017-06-20 DIAGNOSIS — B19.10 HEPATITIS B VIRUS INFECTION: ICD-10-CM

## 2017-06-20 RX ORDER — ENTECAVIR 0.5 MG/1
0.5 TABLET, FILM COATED ORAL DAILY
Qty: 30 TABLET | Refills: 2 | Status: SHIPPED | OUTPATIENT
Start: 2017-06-20 | End: 2017-09-05

## 2017-07-03 DIAGNOSIS — R07.9 ACUTE CHEST PAIN: ICD-10-CM

## 2017-07-03 RX ORDER — ISOSORBIDE MONONITRATE 30 MG/1
15 TABLET, EXTENDED RELEASE ORAL DAILY
Qty: 45 TABLET | Refills: 0 | Status: SHIPPED | OUTPATIENT
Start: 2017-07-03 | End: 2017-12-14

## 2017-07-20 ENCOUNTER — OFFICE VISIT (OUTPATIENT)
Dept: PSYCHIATRY | Facility: CLINIC | Age: 57
End: 2017-07-20
Attending: NURSE PRACTITIONER
Payer: MEDICARE

## 2017-07-20 VITALS
BODY MASS INDEX: 29.19 KG/M2 | HEART RATE: 84 BPM | SYSTOLIC BLOOD PRESSURE: 110 MMHG | WEIGHT: 186.4 LBS | DIASTOLIC BLOOD PRESSURE: 74 MMHG

## 2017-07-20 DIAGNOSIS — F33.1 MAJOR DEPRESSIVE DISORDER, RECURRENT EPISODE, MODERATE (H): Primary | ICD-10-CM

## 2017-07-20 PROCEDURE — 99212 OFFICE O/P EST SF 10 MIN: CPT | Mod: ZF

## 2017-07-20 NOTE — MR AVS SNAPSHOT
After Visit Summary   7/20/2017    Jerad Ross    MRN: [id number]           Patient Information     Date Of Birth          1960        Visit Information        Provider Department      7/20/2017 12:00 PM Genia Fraser APRN CNP Psychiat[facility] Clinic        Today's Diagnoses     Major depressive disorder, recurrent episode, moderate (H)    -  1       Follow-ups after your visit        Follow-up notes from your care team     Return in about 12 weeks (around 10/12/2017), or if symptoms worsen or fail to improve.      Your next 10 appointments already scheduled     Sep 08, 2017 10:15 AM CDT   LAB with  LAB   [facility] ([facility])    [address]  [address] [facility] [id number] [phone]           Patient must bring picture ID. Patient should be prepared to give a urine specimen  Please do not eat 10-12 hours before your appointment if you are coming in fasting for labs on lipids, cholesterol, or glucose (sugar). Pregnant women should follow their Care Team instructions. Water with medications is okay. Do not drink coffee or other fluids. If you have concerns about taking  your medications, please ask at office or if scheduling via [employer], send a message by clicking on Secure Messaging, Message Your Care Team.            Sep 12, 2017 10:10 AM CDT   (Arrive by 9:55 AM)   New General Liver with Lina Claros MD   [facility] Hepatology ([facility])    [address] [place] [address]  [address]rd [facility] [id number] [phone]            Oct 19, 2017 12:00 PM CDT   Adult Med Follow UP with RONALDO Dempsey CNP   Psychiatry Clinic ([email or web address] Clinics)    [address]  [address] [facility] [address]   [phone]            Nov 06, 2017  8:45 AM CST   VISUAL FIELD with [facility] EYE VISUAL FIELD   Eye Clinic ([facility])    [address] [facility] [address]th  [address] Clin [address]  [facility] [address]   [phone]            Nov 06, 2017  9:15 AM CST   RETURN GLAUCOMA with Viki Rizzo MD   Eye Clinic ([facility])    [address] [facility]  [address] [facility]  9th [address] Clin 9a  [facility] [address]   [phone]            Jun 12, 2018  1:30 PM CDT   (Arrive by 1:00 PM)   Return Kidney Transplant with Nolan Lovett MD   [facility] Nephrology ([facility])    [address] [facility]  [address]rd [facility] [address] [phone]              Who to contact     Please call your clinic at [phone] to:    Ask questions about your health    Make or cancel appointments    Discuss your medicines    Learn about your test results    Speak to your doctor   If you have compliments or concerns about an experience at your clinic, or if you wish to file a complaint, please contact [facility] Patient Relations at [phone] or email us at [email or web address]         Additional Information About Your Visit        Ex[employer]rehart Information     [employer] gives you secure access to your electronic health record. If you see a primary care provider, you can also send messages to your care team and make appointments. If you have questions, please call your primary care clinic.  If you do not have a primary care provider, please call [phone] and they will assist you.      [employer] is an electronic gateway that provides easy, online access to your medical records. With [employer], you can request a clinic appointment, read your test results, renew a prescription or communicate with your care team.     To access your existing account, please contact your [facility] or call [phone] for assistance.        Care EveryWhere ID     This is your Care EveryWhere ID. This could be used by other organizations to access your [facility] medical records  [id number]        Your Vitals Were      Pulse BMI (Body Mass Index)                84 29.19 kg/m2           Blood Pressure from Last 3 Encounters:   07/20/17 110/74   06/12/17 106/71   03/15/17 124/84    Weight from Last 3 Encounters:   07/20/17 84.6 kg (186 lb 6.4 oz)   06/12/17 85.5 kg (188 lb 6.4 oz)   03/15/17 86.8 kg (191 lb 6.4 oz)              Today, you had the following     No orders found for display         Today's Medication Changes          These changes are accurate as of: 7/20/17 11:59 PM.  If you have any questions, ask your nurse or doctor.               These medicines have changed or have updated prescriptions.        Dose/Directions    FLUoxetine 20 MG capsule   Commonly known as:  PROZAC   This may have changed:    - medication strength  - when to take this   Used for:  Major depressive disorder, recurrent episode, moderate (H)   Changed by:  Genia Fraser APRN CNP        Dose:  20 mg   Take 1 capsule (20 mg) by mouth daily   Quantity:  30 capsule   Refills:  0            Where to get your medicines      These medications were sent to [id number] - [place], [place] - [address] AT [facility] [address]  [address], [place] [address]    Hours:  24-hours Phone:  [phone]     FLUoxetine 20 MG capsule                Primary Care Provider Office Phone # Fax #    Aston Reji Perry [phone]        [facility] [address] [facility] [address]  [facility] [address]        Equal Access to Services     CARLOS MENDOZA AH: Hadii aad ku hadasho Soomaali, waaxda luqadaha, qaybta kaalmada adeegyada, waxay nura haytemi salamanca. So [facility] [phone].    ATENCIÓN: Si habla español, tiene a sanchez disposición servicios gratuitos de asistencia lingüística. Llame al [phone].    We comply with applicable federal civil rights laws and [place] laws. We do not discriminate on the basis of race, color, national origin, age, disability sex, sexual orientation or gender  identity.            Thank you!     Thank you for choosing PS[facility]ATRY CLINIC  for your care. Our goal is always to provide you with excellent care. Hearing back from our patients is one way we can continue to improve our services. Please take a few minutes to complete the written survey that you may receive in the mail after your visit with us. Thank you!             Your Updated Medication List - Protect others around you: Learn how to safely use, store and throw away your medicines at www.disposemymeds.org.          This list is accurate as of: 7/20/17 11:59 PM.  Always use your most recent med list.                   Brand Name Dispense Instructions for use Diagnosis    aspirin 81 MG tablet      Take by mouth daily        atorvastatin 20 MG tablet    LIPITOR    90 tablet    Take 1 tablet (20 mg) by mouth daily You are rox to see your Cardiologist call [phone] to schedule.    NSTEMI (non-ST elevated myocardial infarction) (H)       BETA BLOCKER NOT PRESCRIBED (INTENTIONAL)      Beta Blocker not prescribed intentionally due to Bradycardia < 50 bpm without beta blocker therapy    NSTEMI (non-ST elevated myocardial infarction) (H)       calcium citrate-vitamin D 315-200 MG-UNIT Tabs per tablet    CITRACAL     Take 1 tablet by mouth daily.        clopidogrel 75 MG tablet    PLAVIX    90 tablet    Take 1 tablet (75 mg) by mouth daily    NSTEMI (non-ST elevated myocardial infarction) (H)       entecavir 0.5 MG tablet    BARACLUDE    30 tablet    Take 1 tablet (0.5 mg) by mouth daily    Hepatitis B virus infection       FLUoxetine 20 MG capsule    PROZAC    30 capsule    Take 1 capsule (20 mg) by mouth daily    Major depressive disorder, recurrent episode, moderate (H)       hydrocortisone 2.5 % ointment     28.35 g    Apply topically 2 times daily    Intertrigo       ibuprofen 200 MG tablet    ADVIL/MOTRIN     Take 200 mg by mouth every 4 hours as needed for mild pain    Bronchitis, Essential hypertension, Coronary  artery disease involving native heart without angina pectoris, unspecified vessel or lesion type       isosorbide mononitrate 30 MG 24 hr tablet    IMDUR    45 tablet    Take 0.5 tablets (15 mg) by mouth daily    Acute chest pain       latanoprost 0.005 % ophthalmic solution    XALATAN    3 Bottle    Place 1 drop into both eyes At Bedtime    Borderline glaucoma with ocular hypertension, bilateral       losartan 50 MG tablet    COZAAR    90 tablet    Take 1 tablet (50 mg) by mouth daily    HTN (hypertension)       METAMUCIL PO      Take 2 teaspoonful by mouth daily        MULTIVITAMIN/IRON PO      Take 1 tablet by mouth daily        OMEGA 3 PO      Take 1 capsule by mouth daily        omeprazole 20 MG tablet    priLOSEC OTC    30 tablet    Take  by mouth daily.    Chronic hepatitis B (H), HTN (hypertension), Depression, Unspecified essential hypertension       predniSONE 5 MG tablet    DELTASONE    90 tablet    Take 1 tablet (5 mg) by mouth daily    Kidney transplanted       TYLENOL 325 MG tablet   Generic drug:  acetaminophen      Take 1-2 tablets by mouth every 6 hours as needed.
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
## 2017-07-20 NOTE — NURSING NOTE
Chief Complaint   Patient presents with     Eval/Assessment     depression     Reviewed allergies, smoking status, and pharmacy preference  Administered abuse screening questions   Obtained weight, blood pressure and heart rate

## 2017-07-21 ENCOUNTER — TELEPHONE (OUTPATIENT)
Dept: PSYCHIATRY | Facility: CLINIC | Age: 57
End: 2017-07-21

## 2017-07-21 ENCOUNTER — TELEPHONE (OUTPATIENT)
Dept: TRANSPLANT | Facility: CLINIC | Age: 57
End: 2017-07-21

## 2017-07-21 DIAGNOSIS — Z94.0 KIDNEY TRANSPLANTED: ICD-10-CM

## 2017-07-21 DIAGNOSIS — Z79.899 IMMUNOSUPPRESSIVE MANAGEMENT ENCOUNTER FOLLOWING KIDNEY TRANSPLANT: Primary | ICD-10-CM

## 2017-07-21 DIAGNOSIS — Z94.0 KIDNEY REPLACED BY TRANSPLANT: ICD-10-CM

## 2017-07-21 DIAGNOSIS — R07.9 ACUTE CHEST PAIN: ICD-10-CM

## 2017-07-21 DIAGNOSIS — D84.9 IMMUNOSUPPRESSED STATUS (H): ICD-10-CM

## 2017-07-21 DIAGNOSIS — Z94.0 IMMUNOSUPPRESSIVE MANAGEMENT ENCOUNTER FOLLOWING KIDNEY TRANSPLANT: Primary | ICD-10-CM

## 2017-07-21 NOTE — TELEPHONE ENCOUNTER
----- Message from Yaritza Delgado sent at 7/21/2017  3:08 PM CDT -----  Regarding: Med Update  Contact: [phone]  This pt was in for an Eval yesterday. For the meds he's currently taking he has said he was on 20mg fluoxetine, but when he got home he realized it is actually actually 40mg and he just wanted to call and update you with that information. He does not need a return call if you do not have any questions or concerns.    Ok to leave detailed VM: Yes

## 2017-07-22 ASSESSMENT — PATIENT HEALTH QUESTIONNAIRE - PHQ9: SUM OF ALL RESPONSES TO PHQ QUESTIONS 1-9: 6

## 2017-07-24 RX ORDER — AMLODIPINE BESYLATE 5 MG/1
5 TABLET ORAL DAILY
Qty: 90 TABLET | Refills: 3 | Status: SHIPPED | OUTPATIENT
Start: 2017-07-24 | End: 2017-07-28

## 2017-07-24 RX ORDER — CYCLOSPORINE 25 MG/1
25 CAPSULE, LIQUID FILLED ORAL 2 TIMES DAILY
Qty: 60 CAPSULE | Refills: 1
Start: 2017-07-24 | End: 2017-10-05

## 2017-07-24 RX ORDER — MYCOPHENOLATE MOFETIL 250 MG/1
750 CAPSULE ORAL 2 TIMES DAILY
Qty: 180 CAPSULE | Refills: 11 | Status: SHIPPED | OUTPATIENT
Start: 2017-07-24 | End: 2018-07-26

## 2017-07-24 NOTE — TELEPHONE ENCOUNTER
Return call: question about med dose    PLAN:    Taper off CSA if switch tolerated after 2 - 4 weeks, then increase MMF to 750 BID.    Continue Prednisone 5 mg daily.    Reports have no side effects from Mycophenolate mofetil.  Increased MMF dose from 500 BID to 750 BID.  Current CSA dose is 50 mg BID, decreased to 25 mg BID x 2weeks, 25 mg daily x 1 week.  States feels tired. Recommended having labs drawn. Will check for CMV/EBV.  Verbalized understanding.

## 2017-07-28 DIAGNOSIS — R07.9 ACUTE CHEST PAIN: ICD-10-CM

## 2017-07-31 RX ORDER — AMLODIPINE BESYLATE 5 MG/1
5 TABLET ORAL DAILY
Qty: 90 TABLET | Refills: 1 | Status: SHIPPED | OUTPATIENT
Start: 2017-07-31 | End: 2018-05-31

## 2017-08-02 DIAGNOSIS — Z94.0 KIDNEY TRANSPLANTED: ICD-10-CM

## 2017-08-02 DIAGNOSIS — Z94.0 IMMUNOSUPPRESSIVE MANAGEMENT ENCOUNTER FOLLOWING KIDNEY TRANSPLANT: ICD-10-CM

## 2017-08-02 DIAGNOSIS — D84.9 IMMUNOSUPPRESSED STATUS (H): ICD-10-CM

## 2017-08-02 DIAGNOSIS — Z48.298 AFTERCARE FOLLOWING ORGAN TRANSPLANT: ICD-10-CM

## 2017-08-02 DIAGNOSIS — Z79.899 ENCOUNTER FOR LONG-TERM CURRENT USE OF MEDICATION: ICD-10-CM

## 2017-08-02 DIAGNOSIS — Z94.0 KIDNEY REPLACED BY TRANSPLANT: ICD-10-CM

## 2017-08-02 DIAGNOSIS — Z79.899 IMMUNOSUPPRESSIVE MANAGEMENT ENCOUNTER FOLLOWING KIDNEY TRANSPLANT: ICD-10-CM

## 2017-08-02 LAB
ANION GAP SERPL CALCULATED.3IONS-SCNC: 11 MMOL/L (ref 3–14)
BUN SERPL-MCNC: 22 MG/DL (ref 7–30)
CALCIUM SERPL-MCNC: 10.1 MG/DL (ref 8.5–10.1)
CHLORIDE SERPL-SCNC: 105 MMOL/L (ref 94–109)
CO2 SERPL-SCNC: 23 MMOL/L (ref 20–32)
CREAT SERPL-MCNC: 1.09 MG/DL (ref 0.66–1.25)
ERYTHROCYTE [DISTWIDTH] IN BLOOD BY AUTOMATED COUNT: 15.7 % (ref 10–15)
GFR SERPL CREATININE-BSD FRML MDRD: 70 ML/MIN/1.7M2
GLUCOSE SERPL-MCNC: 126 MG/DL (ref 70–99)
HCT VFR BLD AUTO: 40.6 % (ref 40–53)
HGB BLD-MCNC: 12.9 G/DL (ref 13.3–17.7)
MCH RBC QN AUTO: 29.7 PG (ref 26.5–33)
MCHC RBC AUTO-ENTMCNC: 31.8 G/DL (ref 31.5–36.5)
MCV RBC AUTO: 93 FL (ref 78–100)
PLATELET # BLD AUTO: 425 10E9/L (ref 150–450)
POTASSIUM SERPL-SCNC: 4.4 MMOL/L (ref 3.4–5.3)
RBC # BLD AUTO: 4.35 10E12/L (ref 4.4–5.9)
SODIUM SERPL-SCNC: 138 MMOL/L (ref 133–144)
WBC # BLD AUTO: 11.5 10E9/L (ref 4–11)

## 2017-08-02 PROCEDURE — 87799 DETECT AGENT NOS DNA QUANT: CPT | Performed by: INTERNAL MEDICINE

## 2017-08-03 LAB
EBV DNA # SPEC NAA+PROBE: NORMAL {COPIES}/ML
EBV DNA SPEC NAA+PROBE-LOG#: NORMAL {LOG_COPIES}/ML

## 2017-08-10 ENCOUNTER — MYC MEDICAL ADVICE (OUTPATIENT)
Dept: INTERNAL MEDICINE | Facility: CLINIC | Age: 57
End: 2017-08-10

## 2017-08-21 NOTE — TELEPHONE ENCOUNTER
Left message requesting pt make appointment with clinic for follow up on his pain. Advised pt to call me if he has difficulty scheduling.

## 2017-09-05 DIAGNOSIS — B19.10 HEPATITIS B VIRUS INFECTION: ICD-10-CM

## 2017-09-05 RX ORDER — ENTECAVIR 0.5 MG/1
TABLET, FILM COATED ORAL
Qty: 30 TABLET | Refills: 0 | Status: SHIPPED | OUTPATIENT
Start: 2017-09-05 | End: 2017-09-29

## 2017-09-08 DIAGNOSIS — F33.1 MAJOR DEPRESSIVE DISORDER, RECURRENT EPISODE, MODERATE (H): ICD-10-CM

## 2017-09-08 DIAGNOSIS — Z94.0 KIDNEY REPLACED BY TRANSPLANT: Primary | ICD-10-CM

## 2017-09-08 NOTE — TELEPHONE ENCOUNTER
Medication requested: Fluoxetine 40 mg caps  Last refilled: 7-17-17  Qty: 30      Last seen: 7-20-17  RTC: 12wks  Cancel: 0  No-show: 0  Next appt: 10-19-17    Refill decision: Refill pended and routed to the provider for review/determination due to last clinic note states pt prefers 90 day refills - however order was for 30 day supply - OK to fill for 90?  -- also on 7-21-17 pt called and clarified he is on 40 mg daily of Fluoxetine not the 20 mg daily he had indicated at his visit on 7-20-17 - medication list was not up dated.    Pended order is for 40 mg daily       Kathleen M Doege RN

## 2017-09-09 NOTE — TELEPHONE ENCOUNTER
*Pharmacy was called and they confirm that the patient has always rcvd 40 mg tabs 1 daily  *Last filled August 19th (different than what the faxed request indicated earlier today) - 30 tabs    Med list indicates 20 mg tabs as that is what he told us at 7-20-17 appointment mistakenly -he called to correct the next day - correction just did not make it to the medication list.

## 2017-09-11 RX ORDER — FLUOXETINE 40 MG/1
40 CAPSULE ORAL EVERY MORNING
Qty: 90 CAPSULE | Refills: 0 | Status: SHIPPED | OUTPATIENT
Start: 2017-09-11 | End: 2017-10-19

## 2017-09-11 NOTE — TELEPHONE ENCOUNTER
Genia Fraser, RONALDO CNP   You 2 hours ago (7:33 AM)                 Great! Can you please refill at 40mg for 90 days?     I appreciate your leg work on this.   Genia (Routing comment)                   90 day supply of 40 mg daily sent to the pharmacy.    Kathleen M Doege RN

## 2017-09-12 ENCOUNTER — OFFICE VISIT (OUTPATIENT)
Dept: GASTROENTEROLOGY | Facility: CLINIC | Age: 57
End: 2017-09-12
Attending: INTERNAL MEDICINE
Payer: MEDICARE

## 2017-09-12 VITALS
DIASTOLIC BLOOD PRESSURE: 80 MMHG | TEMPERATURE: 98.1 F | BODY MASS INDEX: 28.01 KG/M2 | HEIGHT: 68 IN | WEIGHT: 184.8 LBS | SYSTOLIC BLOOD PRESSURE: 117 MMHG | HEART RATE: 64 BPM

## 2017-09-12 DIAGNOSIS — B18.1 CHRONIC HEPATITIS B (H): Primary | ICD-10-CM

## 2017-09-12 DIAGNOSIS — Z94.0 KIDNEY TRANSPLANTED: ICD-10-CM

## 2017-09-12 DIAGNOSIS — Z94.0 KIDNEY REPLACED BY TRANSPLANT: ICD-10-CM

## 2017-09-12 DIAGNOSIS — Z94.0 IMMUNOSUPPRESSIVE MANAGEMENT ENCOUNTER FOLLOWING KIDNEY TRANSPLANT: ICD-10-CM

## 2017-09-12 DIAGNOSIS — D84.9 IMMUNOSUPPRESSED STATUS (H): ICD-10-CM

## 2017-09-12 DIAGNOSIS — Z79.899 IMMUNOSUPPRESSIVE MANAGEMENT ENCOUNTER FOLLOWING KIDNEY TRANSPLANT: ICD-10-CM

## 2017-09-12 LAB
ALBUMIN SERPL-MCNC: 3.3 G/DL (ref 3.4–5)
ALP SERPL-CCNC: 85 U/L (ref 40–150)
ALT SERPL W P-5'-P-CCNC: 24 U/L (ref 0–70)
ANION GAP SERPL CALCULATED.3IONS-SCNC: 5 MMOL/L (ref 3–14)
AST SERPL W P-5'-P-CCNC: 20 U/L (ref 0–45)
BILIRUB DIRECT SERPL-MCNC: 0.2 MG/DL (ref 0–0.2)
BILIRUB SERPL-MCNC: 0.9 MG/DL (ref 0.2–1.3)
BUN SERPL-MCNC: 14 MG/DL (ref 7–30)
CALCIUM SERPL-MCNC: 8.2 MG/DL (ref 8.5–10.1)
CHLORIDE SERPL-SCNC: 103 MMOL/L (ref 94–109)
CO2 SERPL-SCNC: 27 MMOL/L (ref 20–32)
CREAT SERPL-MCNC: 0.94 MG/DL (ref 0.66–1.25)
ERYTHROCYTE [DISTWIDTH] IN BLOOD BY AUTOMATED COUNT: 15 % (ref 10–15)
GFR SERPL CREATININE-BSD FRML MDRD: 83 ML/MIN/1.7M2
GLUCOSE SERPL-MCNC: 93 MG/DL (ref 70–99)
HCT VFR BLD AUTO: 38.2 % (ref 40–53)
HGB BLD-MCNC: 12.5 G/DL (ref 13.3–17.7)
INR PPP: 0.86 (ref 0.86–1.14)
MCH RBC QN AUTO: 29.6 PG (ref 26.5–33)
MCHC RBC AUTO-ENTMCNC: 32.7 G/DL (ref 31.5–36.5)
MCV RBC AUTO: 90 FL (ref 78–100)
PLATELET # BLD AUTO: 338 10E9/L (ref 150–450)
POTASSIUM SERPL-SCNC: 4 MMOL/L (ref 3.4–5.3)
PROT SERPL-MCNC: 7.2 G/DL (ref 6.8–8.8)
RBC # BLD AUTO: 4.23 10E12/L (ref 4.4–5.9)
SODIUM SERPL-SCNC: 135 MMOL/L (ref 133–144)
WBC # BLD AUTO: 7.3 10E9/L (ref 4–11)

## 2017-09-12 PROCEDURE — 99212 OFFICE O/P EST SF 10 MIN: CPT | Mod: ZF

## 2017-09-12 PROCEDURE — 87517 HEPATITIS B DNA QUANT: CPT | Performed by: INTERNAL MEDICINE

## 2017-09-12 PROCEDURE — 80180 DRUG SCRN QUAN MYCOPHENOLATE: CPT | Performed by: INTERNAL MEDICINE

## 2017-09-12 PROCEDURE — 36415 COLL VENOUS BLD VENIPUNCTURE: CPT | Performed by: INTERNAL MEDICINE

## 2017-09-12 PROCEDURE — 80076 HEPATIC FUNCTION PANEL: CPT | Performed by: INTERNAL MEDICINE

## 2017-09-12 PROCEDURE — 80048 BASIC METABOLIC PNL TOTAL CA: CPT | Performed by: INTERNAL MEDICINE

## 2017-09-12 PROCEDURE — 85027 COMPLETE CBC AUTOMATED: CPT | Performed by: INTERNAL MEDICINE

## 2017-09-12 PROCEDURE — 85610 PROTHROMBIN TIME: CPT | Performed by: INTERNAL MEDICINE

## 2017-09-12 PROCEDURE — 91200 LIVER ELASTOGRAPHY: CPT | Mod: ZF

## 2017-09-12 ASSESSMENT — PAIN SCALES - GENERAL: PAINLEVEL: NO PAIN (0)

## 2017-09-12 NOTE — NURSING NOTE
"Chief Complaint   Patient presents with     New Patient     Hepaitis B follow up-JYOTI Cruz patient       Initial /80  Pulse 64  Temp 98.1  F (36.7  C) (Oral)  Ht 1.727 m (5' 8\")  Wt 83.8 kg (184 lb 12.8 oz)  BMI 28.1 kg/m2 Estimated body mass index is 28.1 kg/(m^2) as calculated from the following:    Height as of this encounter: 1.727 m (5' 8\").    Weight as of this encounter: 83.8 kg (184 lb 12.8 oz).  Medication Reconciliation: complete  "

## 2017-09-12 NOTE — MR AVS SNAPSHOT
After Visit Summary   9/12/2017    Jerad Ross    MRN: [id number]           Patient Information     Date Of Birth          1960        Visit Information        Provider Department      9/12/2017 10:10 AM Lina Claros MD [facility]        Today's Diagnoses     Chronic hepatitis B (H)    -  1       Follow-ups after your visit        Follow-up notes from your care team     Return in about 6 months (around 3/12/2018).      Your next 10 appointments already scheduled     Sep 19, 2017  9:45 AM CDT   US ABDOMEN LIMITED with UCUS2   [facility] US ([facility])    [address]  [address] [facility] [id number] [phone]           Please bring a list of your medicines (including vitamins, minerals and over-the-counter drugs). Also, tell your doctor about any allergies you may have. Wear comfortable clothes and leave your valuables at home.  Adults: No eating or drinking for 8 hours before the exam. You may take medicine with a small sip of water.  Children: - Children 6+ years: No food or drink for 6 hours before exam. - Children 1-5 years: No food or drink for 4 hours before exam. - Infants, breast-fed: may have breast milk up to 2 hours before exam. - Infants, formula: may have bottle until 4 hours before exam.  Please call the Imaging Department at your exam site with any questions.            Oct 19, 2017 12:00 PM CDT   Adult Med Follow UP with RONALDO Dempsey CNP   Psychiatry Clinic ([facility])    [address]  [address] [facility] [address] [phone]            Nov 06, 2017  8:45 AM CST   VISUAL FIELD with [facility] EYE VISUAL FIELD   Eye Clinic ([facility])    [address]  [address]  [address]  [facility] [address]   [phone]            Nov 06, 2017  9:15 AM CST   RETURN GLAUCOMA with Viki Rizzo MD   Eye Clinic ([facility]  [facility])    [address]  [address]  [address]  [facility] [address]   [phone]            Mar 13, 2018  9:30 AM CDT   Lab with UC LAB   [facility] ([facility])    [address]  [address] [facility] [address]   [phone]            Mar 13, 2018 10:30 AM CDT   (Arrive by 10:15 AM)   Return General Liver with Lina Claros MD   [facility] ([facility])    [address]  [address] [facility] [address] [phone]            Jun 12, 2018  1:30 PM CDT   (Arrive by 1:00 PM)   Return Kidney Transplant with Nolan Lovett MD   [facility] ([facility])    [address]  [address] [facility] [id number] [phone]              Future tests that were ordered for you today     Open Future Orders        Priority Expected Expires Ordered    INR Routine 3/12/2018 9/12/2018 9/12/2017    CBC with platelets Routine 3/12/2018 9/12/2018 9/12/2017    Hepatic panel Routine 3/12/2018 9/12/2018 9/12/2017    Basic metabolic panel Routine 3/12/2018 9/12/2018 9/12/2017    Hep B Virus DNA Quant Real Time PCR Routine 3/12/2018 9/12/2018 9/12/2017    US Abdomen Limited* Routine  9/12/2018 9/12/2017            Who to contact     If you have questions or need follow up information about today's clinic visit or your schedule please contact [facility] directly at [phone].  Normal or non-critical lab and imaging results will be communicated to you by MyChart, letter or phone within 4 business days after the clinic has received the results. If you do not hear from us within 7 days, please contact the clinic through MyChart or phone. If you have a critical or abnormal lab result, we will notify you by phone as soon as possible.  Submit refill requests through [employer] or call your pharmacy and they will forward the refill request to us. Please  "allow 3 business days for your refill to be completed.          Additional Information About Your Visit        MyChart Information     "[employer] gives you secure access to your electronic health record. If you see a primary care provider, you can also send messages to your care team and make appointments. If you have questions, please call your primary care clinic.  If you do not have a primary care provider, please call [phone] and they will assist you.        Care EveryWhere ID     This is your Care EveryWhere ID. This could be used by other organizations to access your [facility] medical records  [id number]        Your Vitals Were     Pulse Temperature Height BMI (Body Mass Index)          64 98.1  F (36.7  C) (Oral) 1.727 m (5' 8\") 28.1 kg/m2         Blood Pressure from Last 3 Encounters:   09/12/17 117/80   06/12/17 106/71   03/15/17 124/84    Weight from Last 3 Encounters:   09/12/17 83.8 kg (184 lb 12.8 oz)   06/12/17 85.5 kg (188 lb 6.4 oz)   03/15/17 86.8 kg (191 lb 6.4 oz)                 Today's Medication Changes          These changes are accurate as of: 9/12/17 11:06 AM.  If you have any questions, ask your nurse or doctor.               These medicines have changed or have updated prescriptions.        Dose/Directions    FLUoxetine 40 MG capsule   Commonly known as:  PROzac   This may have changed:  Another medication with the same name was removed. Continue taking this medication, and follow the directions you see here.   Used for:  Major depressive disorder, recurrent episode, moderate (H)   Changed by:  Genia Fraser, RONALDO CNP        Dose:  40 mg   Take 1 capsule (40 mg) by mouth every morning   Quantity:  90 capsule   Refills:  0                Primary Care Provider Office Phone # Fax #    Aston Perry [phone]       [address] [place] [id number]        Equal Access to Services     CARLOS MENDOZA AH: josefina Coronado, " ervni nogueira vonnish hanley ah. So [facility] [place] [phone].    ATENCIÓN: Si afia alvarez, tiene a sanchez disposición servicios gratuitos de asistencia lingüística. Spencer al [phone].    We comply with applicable federal civil rights laws and [place] laws. We do not discriminate on the basis of race, color, national origin, age, disability sex, sexual orientation or gender identity.            Thank you!     Thank you for choosing [facility]  for your care. Our goal is always to provide you with excellent care. Hearing back from our patients is one way we can continue to improve our services. Please take a few minutes to complete the written survey that you may receive in the mail after your visit with us. Thank you!             Your Updated Medication List - Protect others around you: Learn how to safely use, store and throw away your medicines at www.disposemymeds.org.          This list is accurate as of: 9/12/17 11:06 AM.  Always use your most recent med list.                   Brand Name Dispense Instructions for use Diagnosis    amLODIPine 5 MG tablet    NORVASC    90 tablet    Take 1 tablet (5 mg) by mouth daily    Acute chest pain       aspirin 81 MG tablet      Take by mouth daily        atorvastatin 20 MG tablet    LIPITOR    90 tablet    Take 1 tablet (20 mg) by mouth daily You are rox to see your Cardiologist call [phone] to schedule.    NSTEMI (non-ST elevated myocardial infarction) (H)       BETA BLOCKER NOT PRESCRIBED (INTENTIONAL)      Beta Blocker not prescribed intentionally due to Bradycardia < 50 bpm without beta blocker therapy    NSTEMI (non-ST elevated myocardial infarction) (H)       calcium citrate-vitamin D 315-200 MG-UNIT Tabs per tablet    CITRACAL     Take 1 tablet by mouth daily.        clopidogrel 75 MG tablet    PLAVIX    90 tablet    Take 1 tablet (75 mg) by mouth daily    NSTEMI (non-ST elevated myocardial infarction) (H)        cycloSPORINE modified capsule     60 capsule    Take 1 capsule (25 mg) by mouth 2 times daily    Kidney replaced by transplant       entecavir 0.5 MG tablet    BARACLUDE    30 tablet    TAKE 1 TABLET(0.5 MG) BY MOUTH DAILY    Hepatitis B virus infection       FLUoxetine 40 MG capsule    PROzac    90 capsule    Take 1 capsule (40 mg) by mouth every morning    Major depressive disorder, recurrent episode, moderate (H)       ibuprofen 200 MG tablet    ADVIL/MOTRIN     Take 200 mg by mouth every 4 hours as needed for mild pain    Bronchitis, Essential hypertension, Coronary artery disease involving native heart without angina pectoris, unspecified vessel or lesion type       isosorbide mononitrate 30 MG 24 hr tablet    IMDUR    45 tablet    Take 0.5 tablets (15 mg) by mouth daily    Acute chest pain       latanoprost 0.005 % ophthalmic solution    XALATAN    3 Bottle    Place 1 drop into both eyes At Bedtime    Borderline glaucoma with ocular hypertension, bilateral       losartan 50 MG tablet    COZAAR    90 tablet    Take 1 tablet (50 mg) by mouth daily    HTN (hypertension)       MULTIVITAMIN/IRON PO      Take 1 tablet by mouth daily        mycophenolate 250 MG capsule    GENERIC EQUIVALENT    180 capsule    Take 3 capsules (750 mg) by mouth 2 times daily    Kidney transplanted       OMEGA 3 PO      Take 1 capsule by mouth daily        omeprazole 20 MG tablet    priLOSEC OTC    30 tablet    Take  by mouth daily.    Chronic hepatitis B (H), HTN (hypertension), Depression, Unspecified essential hypertension       predniSONE 5 MG tablet    DELTASONE    90 tablet    Take 1 tablet (5 mg) by mouth daily    Kidney transplanted       TYLENOL 325 MG tablet   Generic drug:  acetaminophen      Take 1-2 tablets by mouth every 6 hours as needed.

## 2017-09-12 NOTE — PROGRESS NOTES
REASON FOR CONSULTATION: hepatitis c  REFERRING PROVIDER:    A/P  Jerad Ross is a 57 year old male with HBV. He is on entecavir with normal liver tests for the last year. HBV DNA is pending from today. Previously was undetectable.  Will continue with every 6 month labs and US.  Will get assessment of fibrosis today  RTC 6 months given the previous instability in entecavir access, and follow up fibrosis scan results.      Subjective  Jerad Ross is a 57 year old male with hepatitis B. He previously saw Shannon Cruz. 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  First diagnosis of HBV was when he was about 16 years old.  Last HBV DNA was 3/10/17 and it was below limits of detection. HEp B eAby positive,  e ag negative. He is on entecavir for the last year.    Assessment of fibrosis: not done  Histrory of treatment: entecavir only. Was not able to get it reliably in past. Now he can and has been taking it consistently.     HCV negative in 2008  HIV not documented    ALT 24  AST 20  Tbili 0.9  Alk phos 85  INR 0.86  Albumin 3.3  Platelets 338  Creatinine 0.94  ETOH use: very rare. 1 drink per month.    Past Medical History:   Diagnosis Date     AION (acute ischemic optic neuropathy)      Anemia in chronic renal disease      Avascular necrosis of bones of both hips (H)     s/p bilateral hip replacements     Basal cell carcinoma      CAD (coronary artery disease) 6/17/2014     Chronic hepatitis B (H)      Clostridium difficile colitis      CRP elevated      Depression      Diverticulosis      Dyslipidemia      FSGS (focal segmental glomerulosclerosis)      Gastric ulcer with hemorrhage 2/12/12     GERD (gastroesophageal reflux disease)      Glaucoma     OHTN     Hemorrhoids      Hypertension secondary to other renal disorders      Hypogonadism in male      Immunosuppressed status (H)      Kidney replaced by transplant     focal glomerulosclerosis      NSTEMI (non-ST elevated myocardial infarction) (H)  "6/17/2014     JULIANE (obstructive sleep apnea)     Doesn't use CPAP     Paracentral scotoma     LE     Secondary renal hyperparathyroidism (H)      Squamous cell carcinoma        Social History     Social History     Marital status:      Spouse name: N/A     Number of children: 0     Years of education: N/A     Occupational History      Disabled      [employer]     Social History Main Topics     Smoking status: Former Smoker     Packs/day: 1.00     Years: 9.00     Quit date: 1/1/1988     Smokeless tobacco: Former User     Alcohol use 0.0 oz/week     0 Standard drinks or equivalent per week      Comment: rarely 1 drink per month     Drug use: No     Sexual activity: Not on file     Other Topics Concern     Special Diet No     Exercise Yes     walks     Social History Narrative       Family History   Problem Relation Age of Onset     Cardiovascular Father      AI with valve repair     Hypertension Father      KIDNEY DISEASE Father      CANCER Paternal Grandmother      ovarian ca     CEREBROVASCULAR DISEASE Paternal Grandfather      CEREBROVASCULAR DISEASE Maternal Grandfather      KIDNEY DISEASE Paternal Aunt      Skin Cancer No family hx of      Glaucoma No family hx of      Macular Degeneration No family hx of      Amblyopia No family hx of         ROS: 10 point ROS neg other than the symptoms noted above in the HPI.    Vitals: /80  Pulse 64  Temp 98.1  F (36.7  C) (Oral)  Ht 1.727 m (5' 8\")  Wt 83.8 kg (184 lb 12.8 oz)  BMI 28.1 kg/m2  BMI= Body mass index is 28.1 kg/(m^2).BMI= Body mass index is 30.69 kg/(m^2).   Constitutional: alert and no distress.   Neck: Neck supple. No adenopathy. Thyroid symmetric, normal size  HEENT:Normocephalic. No masses, lesions, tenderness or abnormalities. No temporal muscle wasting ENT exam normal, no neck nodes or sinus tenderness. No oral lesions  Cardiovascular: negative, No lifts, heaves, or thrills. RRR. No murmurs, clicks gallops or " rub  Respiratory: negative, Good diaphragmatic excursion. Lungs clear. No wheezes or rales  Gastrointestinal: Abdomen soft, non-tender. BS normal.  No masses, organomegaly  Skin: no suspicious lesions or rashes. No spider angiomata or palmar erythema. Nails normal.  Neurologic: Alert and oriented x3. No asterixis or tremor. Gait normal.   Psychiatric:  Appropriate, well groomed.  Hematologic/Lymphatic/Immunologic: Normal cervical and supraclavicular  lymph nodes

## 2017-09-12 NOTE — LETTER
9/12/2017       RE: Jerad Ross  [address]   [place] [address]     Dear Colleague,    Thank you for referring your patient, Jerad Ross, to the [facility]PAT[facility] at [facility]. Please see a copy of my visit note below.    REASON FOR CONSULTATION: hepatitis c  REFERRING PROVIDER:    A/P  Jerad Ross is a 57 year old male with HBV. He is on entecavir with normal liver tests for the last year. HBV DNA is pending from today. Previously was undetectable.  Will continue with every 6 month labs and US.  Will get assessment of fibrosis today  RTC 6 months given the previous instability in entecavir access, and follow up fibrosis scan results.      Subjective  Jerad Ross is a 57 year old male with hepatitis B. He previously saw Shannon Cruz. 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  First diagnosis of HBV was when he was about 16 years old.  Last HBV DNA was 3/10/17 and it was below limits of detection. HEp B eAby positive,  e ag negative. He is on entecavir for the last year.    Assessment of fibrosis: not done  Histrory of treatment: entecavir only. Was not able to get it reliably in past. Now he can and has been taking it consistently.     HCV negative in 2008  HIV not documented    ALT 24  AST 20  Tbili 0.9  Alk phos 85  INR 0.86  Albumin 3.3  Platelets 338  Creatinine 0.94  ETOH use: very rare. 1 drink per month.    Past Medical History:   Diagnosis Date     AION (acute ischemic optic neuropathy)      Anemia in chronic renal disease      Avascular necrosis of bones of both hips (H)     s/p bilateral hip replacements     Basal cell carcinoma      CAD (coronary artery disease) 6/17/2014     Chronic hepatitis B (H)      Clostridium difficile colitis      CRP elevated      Depression      Diverticulosis      Dyslipidemia      FSGS (focal segmental glomerulosclerosis)      Gastric ulcer with hemorrhage 2/12/12     GERD  "(gastroesophageal reflux disease)      Glaucoma     OHTN     Hemorrhoids      Hypertension secondary to other renal disorders      Hypogonadism in male      Immunosuppressed status (H)      Kidney replaced by transplant     focal glomerulosclerosis      NSTEMI (non-ST elevated myocardial infarction) (H) 6/17/2014     JULIANE (obstructive sleep apnea)     Doesn't use CPAP     Paracentral scotoma     LE     Secondary renal hyperparathyroidism (H)      Squamous cell carcinoma        Social History     Social History     Marital status:      Spouse name: N/A     Number of children: 0     Years of education: N/A     Occupational History      Disabled      [employer]     Social History Main Topics     Smoking status: Former Smoker     Packs/day: 1.00     Years: 9.00     Quit date: 1/1/1988     Smokeless tobacco: Former User     Alcohol use 0.0 oz/week     0 Standard drinks or equivalent per week      Comment: rarely 1 drink per month     Drug use: No     Sexual activity: Not on file     Other Topics Concern     Special Diet No     Exercise Yes     walks     Social History Narrative       Family History   Problem Relation Age of Onset     Cardiovascular Father      AI with valve repair     Hypertension Father      KIDNEY DISEASE Father      CANCER Paternal Grandmother      ovarian ca     CEREBROVASCULAR DISEASE Paternal Grandfather      CEREBROVASCULAR DISEASE Maternal Grandfather      KIDNEY DISEASE Paternal Aunt      Skin Cancer No family hx of      Glaucoma No family hx of      Macular Degeneration No family hx of      Amblyopia No family hx of         ROS: 10 point ROS neg other than the symptoms noted above in the HPI.    Vitals: /80  Pulse 64  Temp 98.1  F (36.7  C) (Oral)  Ht 1.727 m (5' 8\")  Wt 83.8 kg (184 lb 12.8 oz)  BMI 28.1 kg/m2  BMI= Body mass index is 28.1 kg/(m^2).BMI= Body mass index is 30.69 kg/(m^2).   Constitutional: alert and no distress.   Neck: Neck supple. No " adenopathy. Thyroid symmetric, normal size  HEENT:Normocephalic. No masses, lesions, tenderness or abnormalities. No temporal muscle wasting ENT exam normal, no neck nodes or sinus tenderness. No oral lesions  Cardiovascular: negative, No lifts, heaves, or thrills. RRR. No murmurs, clicks gallops or rub  Respiratory: negative, Good diaphragmatic excursion. Lungs clear. No wheezes or rales  Gastrointestinal: Abdomen soft, non-tender. BS normal.  No masses, organomegaly  Skin: no suspicious lesions or rashes. No spider angiomata or palmar erythema. Nails normal.  Neurologic: Alert and oriented x3. No asterixis or tremor. Gait normal.   Psychiatric:  Appropriate, well groomed.  Hematologic/Lymphatic/Immunologic: Normal cervical and supraclavicular  lymph nodes        Again, thank you for allowing me to participate in the care of your patient.      Sincerely,    Lina Claros MD

## 2017-09-13 LAB
CMV DNA SPEC NAA+PROBE-ACNC: NORMAL [IU]/ML
CMV DNA SPEC NAA+PROBE-LOG#: NORMAL {LOG_IU}/ML
MYCOPHENOLATE SERPL LC/MS/MS-MCNC: 3.82 MG/L (ref 1–3.5)
MYCOPHENOLATE-G SERPL LC/MS/MS-MCNC: 69.3 MG/L (ref 30–95)
SPECIMEN SOURCE: NORMAL
TME LAST DOSE: ABNORMAL H

## 2017-09-15 LAB
HBV DNA SERPL NAA+PROBE-ACNC: 25 [IU]/ML
HBV DNA SERPL NAA+PROBE-LOG IU: 1.4 {LOG_IU}/ML

## 2017-09-20 ENCOUNTER — OFFICE VISIT (OUTPATIENT)
Dept: INTERNAL MEDICINE | Facility: CLINIC | Age: 57
End: 2017-09-20

## 2017-09-20 VITALS
TEMPERATURE: 98.2 F | SYSTOLIC BLOOD PRESSURE: 105 MMHG | HEART RATE: 69 BPM | RESPIRATION RATE: 18 BRPM | DIASTOLIC BLOOD PRESSURE: 71 MMHG | OXYGEN SATURATION: 95 % | WEIGHT: 185.4 LBS | BODY MASS INDEX: 28.19 KG/M2

## 2017-09-20 DIAGNOSIS — Z23 NEED FOR PROPHYLACTIC VACCINATION AND INOCULATION AGAINST INFLUENZA: Primary | ICD-10-CM

## 2017-09-20 DIAGNOSIS — J98.01 ACUTE BRONCHOSPASM: ICD-10-CM

## 2017-09-20 DIAGNOSIS — J06.9 VIRAL URI WITH COUGH: ICD-10-CM

## 2017-09-20 RX ORDER — ALBUTEROL SULFATE 90 UG/1
2 AEROSOL, METERED RESPIRATORY (INHALATION) EVERY 4 HOURS PRN
Qty: 6.7 G | Refills: 1 | Status: SHIPPED | OUTPATIENT
Start: 2017-09-20 | End: 2018-05-16

## 2017-09-20 RX ORDER — PREDNISONE 20 MG/1
20 TABLET ORAL DAILY
Qty: 5 TABLET | Refills: 0 | Status: SHIPPED | OUTPATIENT
Start: 2017-09-20 | End: 2018-06-12

## 2017-09-20 ASSESSMENT — PAIN SCALES - GENERAL: PAINLEVEL: NO PAIN (1)

## 2017-09-20 NOTE — PROGRESS NOTES
"Jerad Ross is a 57 year old male who comes in for    CC: cough  HPI:    Mr. Ross reports cough x5 days. Will have brief coughing fits. Has felt SOB and wheezing at times. Denies fevers. Symptoms initially presented with nasal congestion and teeth hurting, but that has improved. Denies sore throat. Has tried Robitussin-DM for the cough, does not feel it is helping very much. Cough is rarely productive for thin, clear sputum. Cough is \"barky.\" He does not have a hx of asthma or COPD.     Other issues discussed today:     Patient Active Problem List   Diagnosis     BCC (basal cell carcinoma), trunk     JULIANE (obstructive sleep apnea)     Hypertension secondary to other renal disorders     CAD (coronary artery disease)     NSTEMI (non-ST elevated myocardial infarction) (H)     Depression     Diverticulosis     Hemorrhoids     Kidney replaced by transplant     Basal cell carcinoma     Squamous cell carcinoma     Dyslipidemia     CRP elevated     Glaucoma     AION (acute ischemic optic neuropathy)     Paracentral scotoma     Hip pain     Inflamed seborrheic keratosis     Intertrigo     Chronic hepatitis B (H)     Immunosuppressed status (H)     Hypogonadism in male     GERD (gastroesophageal reflux disease)     Aftercare following organ transplant       Current Outpatient Prescriptions   Medication Sig Dispense Refill     albuterol (PROAIR HFA/PROVENTIL HFA/VENTOLIN HFA) 108 (90 BASE) MCG/ACT Inhaler Inhale 2 puffs into the lungs every 4 hours as needed for shortness of breath / dyspnea or wheezing 6.7 g 1     predniSONE (DELTASONE) 20 MG tablet Take 1 tablet (20 mg) by mouth daily 5 tablet 0     FLUoxetine (PROZAC) 40 MG capsule Take 1 capsule (40 mg) by mouth every morning 90 capsule 0     entecavir (BARACLUDE) 0.5 MG tablet TAKE 1 TABLET(0.5 MG) BY MOUTH DAILY 30 tablet 0     amLODIPine (NORVASC) 5 MG tablet Take 1 tablet (5 mg) by mouth daily 90 tablet 1     mycophenolate (CELLCEPT - GENERIC EQUIVALENT) 250 MG " capsule Take 3 capsules (750 mg) by mouth 2 times daily 180 capsule 11     NEORAL 25 MG PO CAPSULE Take 1 capsule (25 mg) by mouth 2 times daily 60 capsule 1     isosorbide mononitrate (IMDUR) 30 MG 24 hr tablet Take 0.5 tablets (15 mg) by mouth daily 45 tablet 0     predniSONE (DELTASONE) 5 MG tablet Take 1 tablet (5 mg) by mouth daily 90 tablet 3     latanoprost (XALATAN) 0.005 % ophthalmic solution Place 1 drop into both eyes At Bedtime 3 Bottle 3     losartan (COZAAR) 50 MG tablet Take 1 tablet (50 mg) by mouth daily 90 tablet 3     clopidogrel (PLAVIX) 75 MG tablet Take 1 tablet (75 mg) by mouth daily 90 tablet 3     ibuprofen (ADVIL,MOTRIN) 200 MG tablet Take 200 mg by mouth every 4 hours as needed for mild pain       atorvastatin (LIPITOR) 20 MG tablet Take 1 tablet (20 mg) by mouth daily You are rox to see your Cardiologist call [phone] to schedule. 90 tablet 1     BETA BLOCKER NOT PRESCRIBED, INTENTIONAL, Beta Blocker not prescribed intentionally due to Bradycardia < 50 bpm without beta blocker therapy  0     aspirin 81 MG tablet Take by mouth daily       Omega-3 Fatty Acids (OMEGA 3 PO) Take 1 capsule by mouth daily       omeprazole (PRILOSEC OTC) 20 MG tablet Take  by mouth daily. 30 tablet      calcium citrate-vitamin D (CITRACAL) 315-200 MG-UNIT TABS Take 1 tablet by mouth daily.       Multiple Vitamins-Iron (MULTIVITAMIN/IRON PO) Take 1 tablet by mouth daily        acetaminophen (TYLENOL) 325 MG tablet Take 1-2 tablets by mouth every 6 hours as needed.           ALLERGIES: Penicillins; Contrast dye; Keflex [cephalexin hcl]; Tetracycline; and Sulfa drugs    PAST MEDICAL HX:   Past Medical History:   Diagnosis Date     AION (acute ischemic optic neuropathy)      Anemia in chronic renal disease      Avascular necrosis of bones of both hips (H)     s/p bilateral hip replacements     Basal cell carcinoma      CAD (coronary artery disease) 6/17/2014     Chronic hepatitis B (H)      Clostridium difficile  colitis      CRP elevated      Depression      Diverticulosis      Dyslipidemia      FSGS (focal segmental glomerulosclerosis)      Gastric ulcer with hemorrhage 2/12/12     GERD (gastroesophageal reflux disease)      Glaucoma     OHTN     Hemorrhoids      Hypertension secondary to other renal disorders      Hypogonadism in male      Immunosuppressed status (H)      Kidney replaced by transplant     focal glomerulosclerosis      NSTEMI (non-ST elevated myocardial infarction) (H) 6/17/2014     JULIANE (obstructive sleep apnea)     Doesn't use CPAP     Paracentral scotoma     LE     Secondary renal hyperparathyroidism (H)      Squamous cell carcinoma        PAST SURGICAL HX:   Past Surgical History:   Procedure Laterality Date     APPENDECTOMY       CATARACT IOL, RT/LT  4/19/2000    RE     CATARACT IOL, RT/LT  6/1/2000    LE     COLECTOMY SUBTOTAL  1983    10 cm, diverticulitis     COLONOSCOPY  2/13/2012    Procedure:COLONOSCOPY; Surgeon:SLOAN GALLARDO; Location: GI     ESOPHAGOSCOPY, GASTROSCOPY, DUODENOSCOPY (EGD), COMBINED  2/13/2012    Procedure:COMBINED ESOPHAGOSCOPY, GASTROSCOPY, DUODENOSCOPY (EGD); Surgeon:SLOAN GALLARDO; Location: GI     EXTRACAPSULAR CATARACT EXTRATION WITH INTRAOCULAR LENS IMPLANT  4-20-10, 6-1-10    Rt, Lt     HERNIA REPAIR  1995    Lt inguinal     HIP SURGERY      1981, bilat MITZI, revised 2001, 2005     KIDNEY SURGERY  1978 and 1993    transplant     KNEE SURGERY  1983, 1987    bilat TKA     MOHS MICROGRAPHIC PROCEDURE       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, auxiliary spleen     TONSILLECTOMY         IMMUNIZATION HX:   Immunization History   Administered Date(s) Administered     HEPA 01/26/2009, 01/20/2016     Influenza (IIV3) 01/26/2009, 11/10/2010, 01/04/2012, 11/11/2013, 10/15/2015, 11/04/2016, 09/20/2017     Influenza Vaccine IM 3yrs+ 4 Valent IIV4 10/08/2014     Pneumococcal (PCV 13) 10/08/2014     Pneumococcal 23 valent 11/01/2005     Tdap (Adacel,Boostrix)  01/26/2009       SOCIAL HX:   Social History     Social History Narrative    . Wife is also a kidney transplant recipient.     Works part-time       ROS:   CONSTITUTIONAL: no fatigue, no unexpected change in weight  SKIN: no worrisome rashes, no worrisome moles, no worrisome lesions  EYES: no acute vision problems or changes  ENT: no ear problems, no mouth problems, no throat problems  RESP:see HPI  CV: no chest pain, no palpitations, no new or worsening peripheral edema    OBJECTIVE:  /71  Pulse 69  Temp 98.2  F (36.8  C) (Oral)  Resp 18  Wt 84.1 kg (185 lb 6.4 oz)  SpO2 95%  BMI 28.19 kg/m2   Wt Readings from Last 1 Encounters:   09/20/17 84.1 kg (185 lb 6.4 oz)     Constitutional: no distress, comfortable, pleasant, well-groomed  Eyes: anicteric, conjunctiva pink, normal extra-ocular movements   Ears, Nose and Throat: tympanic membranes pearly gray with positive light reflex, EACs clear bilaterally, nose clear and free of lesions, throat clear, mucosa pink and moist.   Neck: supple with full range of motion, no thyromegaly, no lymphadenopathy  Cardiovascular: regular rate and rhythm, normal S1 and S2, no murmurs, rubs or gallops  Respiratory: good air movement bilaterally, expiratory wheezes throughout, no crackles, non-labored, dry cough      ASSESSMENT/PLAN:    1. Need for prophylactic vaccination and inoculation against influenza  Risks and benefits discussed, vaccine administered in clinic today.  - FLU VACCINE, 3 YRS +, IM  [19531]    2. Acute bronchospasm  3. Viral URI with cough  Given symptom onset with nasal congestion that progressed to cough, discussed viral URI. Recommend staying well-hydrated, good hand hygiene, and rest. Advised albuterol inhaler q 4-6 hr for wheezing or SOB as needed--reviewed instructions for use. Will do short course prednisone to help with airway inflammation.  - albuterol (PROAIR HFA/PROVENTIL HFA/VENTOLIN HFA) 108 (90 BASE) MCG/ACT Inhaler; Inhale 2 puffs  into the lungs every 4 hours as needed for shortness of breath / dyspnea or wheezing  Dispense: 6.7 g; Refill: 1  - predniSONE (DELTASONE) 20 MG tablet; Take 1 tablet (20 mg) by mouth daily  Dispense: 5 tablet; Refill: 0    FOLLOW UP: If not improving or if worsening, or as needed for any changes or concerns  RONALDO Meredith CNP

## 2017-09-20 NOTE — NURSING NOTE
Chief Complaint   Patient presents with     Cough     Patient is here for a cough. Duration about 5 days.      Laina Martínez LPN at 11:56 AM on 9/20/2017.

## 2017-09-20 NOTE — PATIENT INSTRUCTIONS
[facility]: [phone]     [facility] Medication Refill Request Information:  * Please contact your pharmacy regarding ANY request for medication refills.  ** [facility] Prescription Fax = [phone]  * Please allow 3 business days for routine medication refills.  * Please allow 5 business days for controlled substance medication refills.     [facility] Test Result notification information:  *You will be notified with in 7-10 days of your appointment day regarding the results of your test.  If you are on MyChart you will be notified as soon as the provider has reviewed the results and signed off on them.

## 2017-09-20 NOTE — MR AVS SNAPSHOT
After Visit Summary   9/20/2017    Jerad Ross    MRN: [id number]           Patient Information     Date Of Birth          1960        Visit Information        Provider Department      9/20/2017 12:00 PM Kristi Miller APRN [facility]        Today's Diagnoses     Need for prophylactic vaccination and inoculation against influenza    -  1    Acute bronchospasm        Viral URI with cough          Care Instructions    Primary Care Center: [phone]     [facility] Medication Refill Request Information:  * Please contact your pharmacy regarding ANY request for medication refills.  ** [facility] Prescription Fax = [phone]  * Please allow 3 business days for routine medication refills.  * Please allow 5 business days for controlled substance medication refills.     [facility] Test Result notification information:  *You will be notified with in 7-10 days of your appointment day regarding the results of your test.  If you are on MyChart you will be notified as soon as the provider has reviewed the results and signed off on them.            Follow-ups after your visit        Your next 10 appointments already scheduled     Oct 19, 2017 12:00 PM CDT   Adult Med Follow UP with RONALDO Dempsey CNP   Psychiatry Clinic ([facility])    [address]  [address] [facility] [address]   [phone]            Nov 06, 2017  8:45 AM CST   VISUAL FIELD with [facility] EYE VISUAL FIELD   Eye Clinic ([facility])    [address]  [address]   [address] [address]  [facility] [address]   [phone]            Nov 06, 2017  9:15 AM CST   RETURN GLAUCOMA with Viki Rizzo MD   Eye Clinic ([facility])    [address]  [address]   [facility] [address] [place] [address]   [phone]            Nov 07, 2017  9:00 AM CST   US ABDOMEN LIMITED with UCUS3    [facility]  ([facility])    [address] [place] [address]  [address] [id number] [phone]           Please bring a list of your medicines (including vitamins, minerals and over-the-counter drugs). Also, tell your doctor about any allergies you may have. Wear comfortable clothes and leave your valuables at home.  Adults: No eating or drinking for 8 hours before the exam. You may take medicine with a small sip of water.  Children: - Children 6+ years: No food or drink for 6 hours before exam. - Children 1-5 years: No food or drink for 4 hours before exam. - Infants, breast-fed: may have breast milk up to 2 hours before exam. - Infants, formula: may have bottle until 4 hours before exam.  Please call the Imaging Department at your exam site with any questions.            Nov 07, 2017 10:20 AM CST   (Arrive by 10:05 AM)   Return Visit with Aston Perry MD   [facility] ([facility])    [address] [place] [id number] [phone]            Mar 13, 2018  9:30 AM CDT   Lab with  LAB   [facility] Lab ([facility])    [address] [place] [id number] [phone]            Mar 13, 2018 10:30 AM CDT   (Arrive by 10:15 AM)   Return General Liver with Lina Claros MD   [facility] Hepatology ([facility])    [address] [place] [id number] [phone]            Jun 12, 2018  1:30 PM CDT   (Arrive by 1:00 PM)   Return Kidney Transplant with Nolan Lovett MD   [facility] Nephrology ([facility])    [address] [place] [id number] [phone]              Who to contact     Please call your clinic at [phone] to:    Ask questions about your health    Make or cancel appointments    Discuss your medicines    Learn about your test  results    Speak to your doctor   If you have compliments or concerns about an experience at your clinic, or if you wish to file a complaint, please contact [facility] Patient Relations at [phone] or email us at [email or web address]         Additional Information About Your Visit        [employer] Information     [employer] gives you secure access to your electronic health record. If you see a primary care provider, you can also send messages to your care team and make appointments. If you have questions, please call your primary care clinic.  If you do not have a primary care provider, please call [phone] and they will assist you.      [employer] is an electronic gateway that provides easy, online access to your medical records. With [employer], you can request a clinic appointment, read your test results, renew a prescription or communicate with your care team.     To access your existing account, please contact your [facility] or call [phone] for assistance.        Care EveryWhere ID     This is your Care EveryWhere ID. This could be used by other organizations to access your [facility] medical records  [id number]        Your Vitals Were     Pulse Temperature Respirations Pulse Oximetry BMI (Body Mass Index)       69 98.2  F (36.8  C) (Oral) 18 95% 28.19 kg/m2        Blood Pressure from Last 3 Encounters:   09/20/17 105/71   09/12/17 117/80   06/12/17 106/71    Weight from Last 3 Encounters:   09/20/17 84.1 kg (185 lb 6.4 oz)   09/12/17 83.8 kg (184 lb 12.8 oz)   06/12/17 85.5 kg (188 lb 6.4 oz)              We Performed the Following     FLU VACCINE, 3 YRS +, IM  [89731]          Today's Medication Changes          These changes are accurate as of: 9/20/17 12:15 PM.  If you have any questions, ask your nurse or doctor.               Start taking these medicines.        Dose/Directions    albuterol 108 (90 BASE) MCG/ACT Inhaler   Commonly  known as:  PROAIR HFA/PROVENTIL HFA/VENTOLIN HFA   Used for:  Acute bronchospasm   Started by:  Kristi Miller APRN CNP        Dose:  2 puff   Inhale 2 puffs into the lungs every 4 hours as needed for shortness of breath / dyspnea or wheezing   Quantity:  6.7 g   Refills:  1         These medicines have changed or have updated prescriptions.        Dose/Directions    * predniSONE 5 MG tablet   Commonly known as:  DELTASONE   This may have changed:  Another medication with the same name was added. Make sure you understand how and when to take each.   Used for:  Kidney transplanted   Changed by:  Lolly Duffy LPN        Dose:  5 mg   Take 1 tablet (5 mg) by mouth daily   Quantity:  90 tablet   Refills:  3       * predniSONE 20 MG tablet   Commonly known as:  DELTASONE   This may have changed:  You were already taking a medication with the same name, and this prescription was added. Make sure you understand how and when to take each.   Used for:  Acute bronchospasm   Changed by:  Kristi Miller APRN CNP        Dose:  20 mg   Take 1 tablet (20 mg) by mouth daily   Quantity:  5 tablet   Refills:  0       * Notice:  This list has 2 medication(s) that are the same as other medications prescribed for you. Read the directions carefully, and ask your doctor or other care provider to review them with you.         Where to get your medicines      These medications were sent to [place], [place] - [address] [facility]  [address] [facility] [address] [address]    Hours:  TRANSPLANT PHONE NUMBER [phone] Phone:  [phone]     albuterol 108 (90 BASE) MCG/ACT Inhaler    predniSONE 20 MG tablet                Primary Care Provider Office Phone # Fax #    Aston Perry [phone]       [address] [place] [address]  [facility] [address]        Equal Access to Services     CARLOS MENDOZA : josefina Coronado,  nish conrad ah. [facility] [phone].    ATENCIÓN: Si afia alvarez, tiene a sanchez disposición servicios gratuitos de asistencia lingüística. Spencer al [phone].    We comply with applicable federal civil rights laws and [place] laws. We do not discriminate on the basis of race, color, national origin, age, disability sex, sexual orientation or gender identity.            Thank you!     Thank you for choosing [facility]  for your care. Our goal is always to provide you with excellent care. Hearing back from our patients is one way we can continue to improve our services. Please take a few minutes to complete the written survey that you may receive in the mail after your visit with us. Thank you!             Your Updated Medication List - Protect others around you: Learn how to safely use, store and throw away your medicines at www.disposemymeds.org.          This list is accurate as of: 9/20/17 12:15 PM.  Always use your most recent med list.                   Brand Name Dispense Instructions for use Diagnosis    albuterol 108 (90 BASE) MCG/ACT Inhaler    PROAIR HFA/PROVENTIL HFA/VENTOLIN HFA    6.7 g    Inhale 2 puffs into the lungs every 4 hours as needed for shortness of breath / dyspnea or wheezing    Acute bronchospasm       amLODIPine 5 MG tablet    NORVASC    90 tablet    Take 1 tablet (5 mg) by mouth daily    Acute chest pain       aspirin 81 MG tablet      Take by mouth daily        atorvastatin 20 MG tablet    LIPITOR    90 tablet    Take 1 tablet (20 mg) by mouth daily You are rox to see your Cardiologist call [phone] to schedule.    NSTEMI (non-ST elevated myocardial infarction) (H)       BETA BLOCKER NOT PRESCRIBED (INTENTIONAL)      Beta Blocker not prescribed intentionally due to Bradycardia < 50 bpm without beta blocker therapy    NSTEMI (non-ST elevated myocardial infarction) (H)       calcium citrate-vitamin D 315-200  MG-UNIT Tabs per tablet    CITRACAL     Take 1 tablet by mouth daily.        clopidogrel 75 MG tablet    PLAVIX    90 tablet    Take 1 tablet (75 mg) by mouth daily    NSTEMI (non-ST elevated myocardial infarction) (H)       cycloSPORINE modified capsule     60 capsule    Take 1 capsule (25 mg) by mouth 2 times daily    Kidney replaced by transplant       entecavir 0.5 MG tablet    BARACLUDE    30 tablet    TAKE 1 TABLET(0.5 MG) BY MOUTH DAILY    Hepatitis B virus infection       FLUoxetine 40 MG capsule    PROzac    90 capsule    Take 1 capsule (40 mg) by mouth every morning    Major depressive disorder, recurrent episode, moderate (H)       ibuprofen 200 MG tablet    ADVIL/MOTRIN     Take 200 mg by mouth every 4 hours as needed for mild pain    Bronchitis, Essential hypertension, Coronary artery disease involving native heart without angina pectoris, unspecified vessel or lesion type       isosorbide mononitrate 30 MG 24 hr tablet    IMDUR    45 tablet    Take 0.5 tablets (15 mg) by mouth daily    Acute chest pain       latanoprost 0.005 % ophthalmic solution    XALATAN    3 Bottle    Place 1 drop into both eyes At Bedtime    Borderline glaucoma with ocular hypertension, bilateral       losartan 50 MG tablet    COZAAR    90 tablet    Take 1 tablet (50 mg) by mouth daily    HTN (hypertension)       MULTIVITAMIN/IRON PO      Take 1 tablet by mouth daily        mycophenolate 250 MG capsule    GENERIC EQUIVALENT    180 capsule    Take 3 capsules (750 mg) by mouth 2 times daily    Kidney transplanted       OMEGA 3 PO      Take 1 capsule by mouth daily        omeprazole 20 MG tablet    priLOSEC OTC    30 tablet    Take  by mouth daily.    Chronic hepatitis B (H), HTN (hypertension), Depression, Unspecified essential hypertension       * predniSONE 5 MG tablet    DELTASONE    90 tablet    Take 1 tablet (5 mg) by mouth daily    Kidney transplanted       * predniSONE 20 MG tablet    DELTASONE    5 tablet    Take 1 tablet  (20 mg) by mouth daily    Acute bronchospasm       TYLENOL 325 MG tablet   Generic drug:  acetaminophen      Take 1-2 tablets by mouth every 6 hours as needed.        * Notice:  This list has 2 medication(s) that are the same as other medications prescribed for you. Read the directions carefully, and ask your doctor or other care provider to review them with you.

## 2017-09-29 DIAGNOSIS — B19.10 HEPATITIS B VIRUS INFECTION: ICD-10-CM

## 2017-09-29 RX ORDER — ENTECAVIR 0.5 MG/1
TABLET, FILM COATED ORAL
Qty: 30 TABLET | Refills: 5 | Status: SHIPPED | OUTPATIENT
Start: 2017-09-29 | End: 2018-03-13

## 2017-10-03 ENCOUNTER — TELEPHONE (OUTPATIENT)
Dept: TRANSPLANT | Facility: CLINIC | Age: 57
End: 2017-10-03

## 2017-10-04 ENCOUNTER — TELEPHONE (OUTPATIENT)
Dept: TRANSPLANT | Facility: CLINIC | Age: 57
End: 2017-10-04

## 2017-10-04 NOTE — TELEPHONE ENCOUNTER
Spoke to patient and he saw Dr Lovett 06-12-17 where it states will taper off Neoral and take Mycophenolate 750 mg BID. Patient still on Gengraf wondering if he should remain on or d/c.

## 2017-10-06 ENCOUNTER — OFFICE VISIT (OUTPATIENT)
Dept: FAMILY MEDICINE | Facility: CLINIC | Age: 57
End: 2017-10-06

## 2017-10-06 VITALS
DIASTOLIC BLOOD PRESSURE: 77 MMHG | SYSTOLIC BLOOD PRESSURE: 116 MMHG | WEIGHT: 187 LBS | RESPIRATION RATE: 16 BRPM | OXYGEN SATURATION: 97 % | TEMPERATURE: 98.4 F | HEART RATE: 72 BPM | BODY MASS INDEX: 28.43 KG/M2

## 2017-10-06 DIAGNOSIS — R05.9 COUGH: Primary | ICD-10-CM

## 2017-10-06 RX ORDER — AZITHROMYCIN 250 MG/1
TABLET, FILM COATED ORAL
Qty: 6 TABLET | Refills: 0 | Status: SHIPPED | OUTPATIENT
Start: 2017-10-06 | End: 2017-11-07

## 2017-10-06 ASSESSMENT — PAIN SCALES - GENERAL: PAINLEVEL: NO PAIN (0)

## 2017-10-06 NOTE — PATIENT INSTRUCTIONS
[facility] Medication Refill Request Information:  * Please contact your pharmacy regarding ANY request for medication refills.  ** [facility] Prescription Fax = [phone]  * Please allow 3 business days for routine medication refills.  * Please allow 5 business days for controlled substance medication refills.     [facility] Test Result notification information:  *You will be notified with in 7-10 days of your appointment day regarding the results of your test.  If you are on MyChart you will be notified as soon as the provider has reviewed the results and signed off on them.    [facility] [phone]

## 2017-10-06 NOTE — MR AVS SNAPSHOT
After Visit Summary   10/6/2017    Jerad Ross    MRN: [id number]           Patient Information     Date Of Birth          1960        Visit Information        Provider Department      10/6/2017 12:05 PM Juan Dickson MD [facility]        Today's Diagnoses     Cough    -  1      Care Instructions    Primary Care Center Medication Refill Request Information:  * Please contact your pharmacy regarding ANY request for medication refills.  ** [facility] Prescription Fax = [phone]  * Please allow 3 business days for routine medication refills.  * Please allow 5 business days for controlled substance medication refills.     Primary Care Center Test Result notification information:  *You will be notified with in 7-10 days of your appointment day regarding the results of your test.  If you are on MyChart you will be notified as soon as the provider has reviewed the results and signed off on them.    Primary Care Center [phone]             Follow-ups after your visit        Your next 10 appointments already scheduled     Oct 19, 2017 12:00 PM CDT   Adult Med Follow UP with RONALDO Dempsey CNP   Psychiatry Clinic ([facility])    [address]  [address] [facility] [address]   [phone]            Nov 06, 2017  8:45 AM CST   VISUAL FIELD with [facility] EYE VISUAL FIELD   Eye Clinic ([facility])    [address]  [address] [place] [address]   [phone]            Nov 06, 2017  9:15 AM CST   RETURN GLAUCOMA with Viki Rizzo MD   Eye Clinic ([facility])    [address]  [address] [place] [address]   [phone]            Nov 07, 2017  9:00 AM CST   US ABDOMEN LIMITED with UCUS3   [facility] ([facility])    [address] [facility]  [address] [facility] [id number] [phone]            Please bring a list of your medicines (including vitamins, minerals and over-the-counter drugs). Also, tell your doctor about any allergies you may have. Wear comfortable clothes and leave your valuables at home.  Adults: No eating or drinking for 8 hours before the exam. You may take medicine with a small sip of water.  Children: - Children 6+ years: No food or drink for 6 hours before exam. - Children 1-5 years: No food or drink for 4 hours before exam. - Infants, breast-fed: may have breast milk up to 2 hours before exam. - Infants, formula: may have bottle until 4 hours before exam.  Please call the Imaging Department at your exam site with any questions.            Nov 07, 2017 10:20 AM CST   (Arrive by 10:05 AM)   Return Visit with Aston Perry MD   [facility] ([facility])    [address] [place] [id number] [phone]            Mar 13, 2018  9:30 AM CDT   Lab with  LAB   [facility] Lab ([facility])    [address] [place] [address]   [phone]            Mar 13, 2018 10:30 AM CDT   (Arrive by 10:15 AM)   Return General Liver with Lina Claros MD   [facility] Hepatology ([facility])    [address] [place] [address] [phone]            Jun 12, 2018  1:30 PM CDT   (Arrive by 1:00 PM)   Return Kidney Transplant with Nolan Lovett MD   [facility] Nephrology ([facility])    [address] [place] [id number] [phone]              Who to contact     Please call your clinic at [phone] to:    Ask questions about your health    Make or cancel appointments    Discuss your medicines    Learn about your test results    Speak to your doctor   If you have compliments or concerns about an experience at your clinic, or if you wish to file a  complaint, please contact [facility] Patient Relations at [phone] or email us at [email or web address]         Additional Information About Your Visit        DVS Intel[employer]ream[employer] Information     Zomato gives you secure access to your electronic health record. If you see a primary care provider, you can also send messages to your care team and make appointments. If you have questions, please call your primary care clinic.  If you do not have a primary care provider, please call [phone] and they will assist you.      Zomato is an electronic gateway that provides easy, online access to your medical records. With [employer]ato, you can request a clinic appointment, read your test results, renew a prescription or communicate with your care team.     To access your existing account, please contact your [facility] or call [phone] for assistance.        Care EveryWhere ID     This is your Care EveryWhere ID. This could be used by other organizations to access your [place] medical records  [id number]        Your Vitals Were     Pulse Temperature Respirations Pulse Oximetry BMI (Body Mass Index)       72 98.4  F (36.9  C) (Oral) 16 97% 28.43 kg/m2        Blood Pressure from Last 3 Encounters:   10/06/17 116/77   09/20/17 105/71   09/12/17 117/80    Weight from Last 3 Encounters:   10/06/17 84.8 kg (187 lb)   09/20/17 84.1 kg (185 lb 6.4 oz)   09/12/17 83.8 kg (184 lb 12.8 oz)              Today, you had the following     No orders found for display         Today's Medication Changes          These changes are accurate as of: 10/6/17 12:31 PM.  If you have any questions, ask your nurse or doctor.               Start taking these medicines.        Dose/Directions    azithromycin 250 MG tablet   Commonly known as:  ZITHROMAX   Used for:  Cough   Started by:  Juan Dickson MD        Two tablets first day, then one tablet daily for four days.   Quantity:  6  tablet   Refills:  0            Where to get your medicines      These medications were sent to [facility] - [place], [place] - [address] [facility] [address]  [address] [facility] [address], [facility] [address]    Hours:  TRANSPLANT PHONE NUMBER [phone] Phone:  [phone]     azithromycin 250 MG tablet                Primary Care Provider Office Phone # Fax #    Aston Reji Perry [phone]       [address] [place] [address] [address] [facility] [address]  [facility] [address]        Equal Access to Services     CARLOS MENDOZA : Hadii aad ku hadasho Soomaali, waaxda luqadaha, qaybta kaalmada adeegyada, waxay idiin hayaan adeezio gates . So [facility] [phone].    ATENCIÓN: Si habla español, tiene a sanchez disposición servicios gratuitos de asistencia lingüística. Llame al [phone].    We comply with applicable federal civil rights laws and [place] laws. We do not discriminate on the basis of race, color, national origin, age, disability, sex, sexual orientation, or gender identity.            Thank you!     Thank you for choosing [facility]  for your care. Our goal is always to provide you with excellent care. Hearing back from our patients is one way we can continue to improve our services. Please take a few minutes to complete the written survey that you may receive in the mail after your visit with us. Thank you!             Your Updated Medication List - Protect others around you: Learn how to safely use, store and throw away your medicines at www.disposemymeds.org.          This list is accurate as of: 10/6/17 12:31 PM.  Always use your most recent med list.                   Brand Name Dispense Instructions for use Diagnosis    albuterol 108 (90 BASE) MCG/ACT Inhaler    PROAIR HFA/PROVENTIL HFA/VENTOLIN HFA    6.7 g    Inhale 2 puffs into the lungs every 4 hours as needed for shortness of breath / dyspnea or wheezing    Acute bronchospasm       amLODIPine 5 MG tablet     NORVASC    90 tablet    Take 1 tablet (5 mg) by mouth daily    Acute chest pain       aspirin 81 MG tablet      Take by mouth daily        atorvastatin 20 MG tablet    LIPITOR    90 tablet    Take 1 tablet (20 mg) by mouth daily You are rox to see your Cardiologist call [phone] to schedule.    NSTEMI (non-ST elevated myocardial infarction) (H)       azithromycin 250 MG tablet    ZITHROMAX    6 tablet    Two tablets first day, then one tablet daily for four days.    Cough       BETA BLOCKER NOT PRESCRIBED (INTENTIONAL)      Beta Blocker not prescribed intentionally due to Bradycardia < 50 bpm without beta blocker therapy    NSTEMI (non-ST elevated myocardial infarction) (H)       calcium citrate-vitamin D 315-200 MG-UNIT Tabs per tablet    CITRACAL     Take 1 tablet by mouth daily.        clopidogrel 75 MG tablet    PLAVIX    90 tablet    Take 1 tablet (75 mg) by mouth daily    NSTEMI (non-ST elevated myocardial infarction) (H)       entecavir 0.5 MG tablet    BARACLUDE    30 tablet    TAKE 1 TABLET BY MOUTH EVERY DAY    Hepatitis B virus infection       FLUoxetine 40 MG capsule    PROzac    90 capsule    Take 1 capsule (40 mg) by mouth every morning    Major depressive disorder, recurrent episode, moderate (H)       ibuprofen 200 MG tablet    ADVIL/MOTRIN     Take 200 mg by mouth every 4 hours as needed for mild pain    Bronchitis, Essential hypertension, Coronary artery disease involving native heart without angina pectoris, unspecified vessel or lesion type       isosorbide mononitrate 30 MG 24 hr tablet    IMDUR    45 tablet    Take 0.5 tablets (15 mg) by mouth daily    Acute chest pain       latanoprost 0.005 % ophthalmic solution    XALATAN    3 Bottle    Place 1 drop into both eyes At Bedtime    Borderline glaucoma with ocular hypertension, bilateral       losartan 50 MG tablet    COZAAR    90 tablet    Take 1 tablet (50 mg) by mouth daily    HTN (hypertension)       MULTIVITAMIN/IRON PO      Take 1  tablet by mouth daily        mycophenolate 250 MG capsule    GENERIC EQUIVALENT    180 capsule    Take 3 capsules (750 mg) by mouth 2 times daily    Kidney transplanted       OMEGA 3 PO      Take 1 capsule by mouth daily        omeprazole 20 MG tablet    priLOSEC OTC    30 tablet    Take  by mouth daily.    Chronic hepatitis B (H), HTN (hypertension), Depression, Unspecified essential hypertension       * predniSONE 5 MG tablet    DELTASONE    90 tablet    Take 1 tablet (5 mg) by mouth daily    Kidney transplanted       * predniSONE 20 MG tablet    DELTASONE    5 tablet    Take 1 tablet (20 mg) by mouth daily    Acute bronchospasm       TYLENOL 325 MG tablet   Generic drug:  acetaminophen      Take 1-2 tablets by mouth every 6 hours as needed.        * Notice:  This list has 2 medication(s) that are the same as other medications prescribed for you. Read the directions carefully, and ask your doctor or other care provider to review them with you.

## 2017-10-06 NOTE — NURSING NOTE
Chief Complaint   Patient presents with     URI     Here for 4 week follow up on cold.     Umair Hawkins CMA ([facility]) at 12:18 PM on 10/6/2017

## 2017-10-19 ENCOUNTER — OFFICE VISIT (OUTPATIENT)
Dept: PSYCHIATRY | Facility: CLINIC | Age: 57
End: 2017-10-19
Attending: NURSE PRACTITIONER
Payer: MEDICARE

## 2017-10-19 VITALS
DIASTOLIC BLOOD PRESSURE: 76 MMHG | HEART RATE: 90 BPM | SYSTOLIC BLOOD PRESSURE: 107 MMHG | WEIGHT: 184.4 LBS | BODY MASS INDEX: 28.04 KG/M2

## 2017-10-19 DIAGNOSIS — F33.1 MAJOR DEPRESSIVE DISORDER, RECURRENT EPISODE, MODERATE (H): ICD-10-CM

## 2017-10-19 PROCEDURE — 99212 OFFICE O/P EST SF 10 MIN: CPT | Mod: ZF

## 2017-10-19 RX ORDER — FLUOXETINE 40 MG/1
40 CAPSULE ORAL EVERY MORNING
Qty: 90 CAPSULE | Refills: 0 | Status: SHIPPED | OUTPATIENT
Start: 2017-10-19 | End: 2018-01-18

## 2017-10-19 NOTE — PROGRESS NOTES
Outpatient Psychiatry Progress Note     Provider: RONALDO Lyon CNP  Date: 10/19/2017  Service:  Medication management.   Patient Identification: Jerad Ross  : 1960   MRN: [id number]    Jerad Ross is a 57 year old male who presents for ongoing psychiatric care.  Jerad was last seen in clinic on 17 for a psych eval. At that time, he chose to continue fluoxetine 20mg daily. He called after the appt to report he was actually taking 40mg daily.    10/19/2017  Today Jerad reports he's had a virus over the last 6 weeks. His mood is a little more depressed. He notices he strayed away from morning meditation and making fewer phone calls for support. He's had a harder time staying focused on the present. He's considers if his life contributes to the world- he wants to write and considers an [employer] program or other type of communities.    - his mood worsened about the time he was transitioned to his new immunosuppressant mycophenolate. He started dialysis at age 16 and had his first kidney transplant in  and then again in .    - he has been trying to be fully present and supportive for his wife Yodit as she works to complete her Masters in Alcohol and Drug Counseling with a current placement at [place] [employer].    He is in weekly individual therapy and feels better if he attends 3-4 SA groups per week.    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 He enjoys his work and getting to know some of the patients. He enjoys riding the bus and talking to people different than he is in an effort to contribute to the world and grow in his rasheed and away from the current political bias.    He identifies as a Gnosticist who believes in José Luis and is thinking about writing a Bible Study for his previous group member to complete at their leisure while many ware traveling for work or pleasure.    Compliance: daily  Side effects: none    Psychiatric  Review of Systems:  Depression: considers writing to increase feelings of personal contribution in the world  Anxiety: insignificant on daily functioning    Lethality: denies current SI; his rasheed is protective    Review of Medical Systems:  Appetite: OK, weight stable  Sleep: interrupted sleep continues with positional JULIANE, no CPAP  Self Care: encouraged, he enjoys writing poetry and seeks support from a local writer's group  Housework and hygiene: managing as is normal for him  Energy: adequate to low since change in immunosuppressants  Concentration: intact    Current Substance Use:  Social History     Social History     Marital status:      Spouse name: N/A     Number of children: 0     Years of education: N/A     Occupational History      Disabled      [employer]     Social History Main Topics     Smoking status: Former Smoker     Packs/day: 1.00     Years: 9.00     Quit date: 1/1/1988     Smokeless tobacco: Former User     Alcohol use 0.0 oz/week     0 Standard drinks or equivalent per week      Comment: rarely 1 drink per month     Drug use: No     Sexual activity: Not Asked     Other Topics Concern     Special Diet No     Exercise Yes     walks     Social History Narrative    . Wife is also a kidney transplant recipient.     Works part-time     Caffeine: limits coffee and Diet soda    Past Medical History:   Past Medical History:   Diagnosis Date     AION (acute ischemic optic neuropathy)      Anemia in chronic renal disease      Avascular necrosis of bones of both hips (H)     s/p bilateral hip replacements     Basal cell carcinoma      CAD (coronary artery disease) 6/17/2014     Chronic hepatitis B (H)      Clostridium difficile colitis      CRP elevated      Depression      Diverticulosis      Dyslipidemia      FSGS (focal segmental glomerulosclerosis)      Gastric ulcer with hemorrhage 2/12/12     GERD (gastroesophageal reflux disease)      Glaucoma     OHTN      "Hemorrhoids      Hypertension secondary to other renal disorders      Hypogonadism in male      Immunosuppressed status (H)      Kidney replaced by transplant     focal glomerulosclerosis      NSTEMI (non-ST elevated myocardial infarction) (H) 6/17/2014     JULIANE (obstructive sleep apnea)     Doesn't use CPAP     Paracentral scotoma     LE     Secondary renal hyperparathyroidism (H)      Squamous cell carcinoma      Medical health update: complicated medical history including 2x renal transplant; today he reports \"I'm stable right now\"    Allergies:   Allergies   Allergen Reactions     Penicillins Shortness Of Breath and Hives     Contrast Dye Nausea and Vomiting     Keflex [Cephalexin Hcl] Other (See Comments)     Pt could not recall reaction     Tetracycline Other (See Comments)     Patient could not recall reaction     Sulfa Drugs Rash        Current Medications     Current Outpatient Prescriptions Ordered in [facility]   Medication Sig Dispense Refill     azithromycin (ZITHROMAX) 250 MG tablet Two tablets first day, then one tablet daily for four days. 6 tablet 0     entecavir (BARACLUDE) 0.5 MG tablet TAKE 1 TABLET BY MOUTH EVERY DAY 30 tablet 5     albuterol (PROAIR HFA/PROVENTIL HFA/VENTOLIN HFA) 108 (90 BASE) MCG/ACT Inhaler Inhale 2 puffs into the lungs every 4 hours as needed for shortness of breath / dyspnea or wheezing 6.7 g 1     predniSONE (DELTASONE) 20 MG tablet Take 1 tablet (20 mg) by mouth daily 5 tablet 0     FLUoxetine (PROZAC) 40 MG capsule Take 1 capsule (40 mg) by mouth every morning 90 capsule 0     amLODIPine (NORVASC) 5 MG tablet Take 1 tablet (5 mg) by mouth daily 90 tablet 1     mycophenolate (CELLCEPT - GENERIC EQUIVALENT) 250 MG capsule Take 3 capsules (750 mg) by mouth 2 times daily 180 capsule 11     isosorbide mononitrate (IMDUR) 30 MG 24 hr tablet Take 0.5 tablets (15 mg) by mouth daily 45 tablet 0     predniSONE (DELTASONE) 5 MG tablet Take 1 tablet (5 mg) by mouth daily 90 tablet 3     " "latanoprost (XALATAN) 0.005 % ophthalmic solution Place 1 drop into both eyes At Bedtime 3 Bottle 3     losartan (COZAAR) 50 MG tablet Take 1 tablet (50 mg) by mouth daily 90 tablet 3     clopidogrel (PLAVIX) 75 MG tablet Take 1 tablet (75 mg) by mouth daily 90 tablet 3     ibuprofen (ADVIL,MOTRIN) 200 MG tablet Take 200 mg by mouth every 4 hours as needed for mild pain       atorvastatin (LIPITOR) 20 MG tablet Take 1 tablet (20 mg) by mouth daily You are rox to see your Cardiologist call [phone] to schedule. 90 tablet 1     BETA BLOCKER NOT PRESCRIBED, INTENTIONAL, Beta Blocker not prescribed intentionally due to Bradycardia < 50 bpm without beta blocker therapy  0     aspirin 81 MG tablet Take by mouth daily       Omega-3 Fatty Acids (OMEGA 3 PO) Take 1 capsule by mouth daily       omeprazole (PRILOSEC OTC) 20 MG tablet Take  by mouth daily. 30 tablet      calcium citrate-vitamin D (CITRACAL) 315-200 MG-UNIT TABS Take 1 tablet by mouth daily.       Multiple Vitamins-Iron (MULTIVITAMIN/IRON PO) Take 1 tablet by mouth daily        acetaminophen (TYLENOL) 325 MG tablet Take 1-2 tablets by mouth every 6 hours as needed.       No current Epic-ordered facility-administered medications on file.       Mental Status Exam     Appearance:  Formally dressed, Well groomed and Appears older than stated age  Behavior/relationship to examiner/demeanor:  Cooperative, Engaged, Pleasant and Guarded  Orientation: Oriented to person, place, time and situation  Psychomotor: WNL  Speech Rate:  Normal  Speech Spontaneity:  Normal  Mood:  \"okay\"  Affect:  Appropriate/mood-congruent and Subdued  Thought Process (Associations):  Logical, Linear and Goal directed  Thought Content:  no evidence of suicidal or homicidal ideation, denies suicidal ideation, intent or thoughts and patient denies auditory and visual hallucinations  Abnormal Perception:  None  Attention/Concentration:  Normal  Language:  Intact  Insight:  Good  Judgment:  " Good      Results     Vital signs: /76  Pulse 90  Wt 83.6 kg (184 lb 6.4 oz)  BMI 28.04 kg/m2    Laboratory Data:   Lab Results   Component Value Date    WBC 7.3 09/12/2017    HGB 12.5 (L) 09/12/2017    HCT 38.2 (L) 09/12/2017     09/12/2017    CHOL 136 01/20/2015    TRIG 106 01/20/2015    HDL 49 01/20/2015    ALT 24 09/12/2017    AST 20 09/12/2017     09/12/2017    BUN 14 09/12/2017    CO2 27 09/12/2017    TSH 0.56 05/06/2015    PSA 0.70 01/07/2011    INR 0.86 09/12/2017     Assessment & Plan     Jerad is seen today for follow up.    Diagnosis  Axis 1: recurrent MDD, moderate; r/o dysthymia  Axis 2: deferred    Plan:  Medication: continue fluoxetine 40mg daily  OTC Recommendations: none  Other: none  Lab Orders: none; per 9/2017 labs: unremarkable BMP except low CA, unremarkable CBC except low RBC, Hgb, Hct; Lina Claros follows INR  Referrals: sees weekly outside therapist  Release of Information: none  Future Treatment Considerations: pending tolerability, drug interactions, efficacy  Return for Follow Up: 12 weeks, sooner as needed    He voices understanding of the treatment plan including discussion of options, risks/ benefits. He has clinic contact information and will seek emergency services if urgent or life threatening symptoms present. Jerad understands risks associated with drug and alcohol use.     Visit was spent counseling the patient and/or coordinating care regarding review of social and occupational functioning.  In addition patient was counseled on health and wellness practices to augment medication treatment of symptoms. See note for details.    PHQ-9 score:  11  PHQ-9 SCORE 7/20/2017   Total Score -   Total Score 6   Some recent data might be hidden     GAD7: No flowsheet data found.  : 07/2017  Genia Fraser, RONALDO CNP 10/19/2017

## 2017-10-19 NOTE — MR AVS SNAPSHOT
After Visit Summary   10/19/2017    Jerad Ross    MRN: [id number]           Patient Information     Date Of Birth          1960        Visit Information        Provider Department      10/19/2017 12:00 PM Genia Fraser APRN [facility]        Today's Diagnoses     Major depressive disorder, recurrent episode, moderate (H)           Follow-ups after your visit        Follow-up notes from your care team     Return in about 12 weeks (around 1/11/2018), or if symptoms worsen or fail to improve.      Your next 10 appointments already scheduled     Nov 06, 2017  8:45 AM CST   VISUAL FIELD with [facility] EYE VISUAL FIELD   Eye Clinic ([facility])    [address]  [address]   [address] [address]  [facility] [address]   [phone]            Nov 06, 2017  9:15 AM CST   RETURN GLAUCOMA with Viki Rizzo MD   Eye Clinic ([facility])    [address]  [address]  [address] [address]  [facility] [address]   [phone]            Nov 07, 2017  9:00 AM CST   US ABDOMEN LIMITED with UCUS3   [facility] US ([facility])    [address]  1st Floor  [facility] [address] [phone]           Please bring a list of your medicines (including vitamins, minerals and over-the-counter drugs). Also, tell your doctor about any allergies you may have. Wear comfortable clothes and leave your valuables at home.  Adults: No eating or drinking for 8 hours before the exam. You may take medicine with a small sip of water.  Children: - Children 6+ years: No food or drink for 6 hours before exam. - Children 1-5 years: No food or drink for 4 hours before exam. - Infants, breast-fed: may have breast milk up to 2 hours before exam. - Infants, formula: may have bottle until 4 hours before exam.  Please call the Imaging Department at your exam site with any questions.            Nov 07, 2017 10:20 AM CST   (Arrive  by 10:05 AM)   Return Visit with Aston Perry MD   [facility] Clinic ([facility])    [address]  4th Floor  [facility] [address] [phone]            Jan 18, 2018 12:30 PM CST   Adult Med Follow UP with RONALDO Dempsey CNP   Psychiatry Clinic ([facility])    [place] [id number]  [address] [facility] [address] [phone]            Mar 13, 2018  9:30 AM CDT   Lab with UC LAB   [facility] Lab ([facility])    [address]  1st Floor  [facility] [address] [phone]            Mar 13, 2018 10:30 AM CDT   (Arrive by 10:15 AM)   Return General Liver with Lina Claros MD   [facility] Hepatology ([facility])    [address]  3rd [facility] [address] [phone]            Jun 12, 2018  1:30 PM CDT   (Arrive by 1:00 PM)   Return Kidney Transplant with Nolan Lovett MD   [facility] Nephrology ([facility])    [address] [place] [address]  3rd [facility] [address] [phone]              Who to contact     Please call your clinic at [phone] to:    Ask questions about your health    Make or cancel appointments    Discuss your medicines    Learn about your test results    Speak to your doctor   If you have compliments or concerns about an experience at your clinic, or if you wish to file a complaint, please contact [facility] Patient Relations at [phone] or email us at [email or web address]         Additional Information About Your Visit        MyChart Information     [employer] gives you secure access to your electronic health record. If you see a primary care provider, you can also send messages to your care team and make appointments. If you have questions, please call your primary care clinic.  If you do not have a primary care  provider, please call [phone] and they will assist you.      [employer] is an electronic gateway that provides easy, online access to your medical records. With [employer], you can request a clinic appointment, read your test results, renew a prescription or communicate with your care team.     To access your existing account, please contact your [facility] or call [phone] for assistance.        Care EveryWhere ID     This is your Care EveryWhere ID. This could be used by other organizations to access your [facility] medical records  [id number]        Your Vitals Were     Pulse BMI (Body Mass Index)                90 28.04 kg/m2           Blood Pressure from Last 3 Encounters:   10/19/17 107/76   10/06/17 116/77   09/20/17 105/71    Weight from Last 3 Encounters:   10/19/17 83.6 kg (184 lb 6.4 oz)   10/06/17 84.8 kg (187 lb)   09/20/17 84.1 kg (185 lb 6.4 oz)              Today, you had the following     No orders found for display         Where to get your medicines      These medications were sent to [facility] [address] [place], [place] [address] [address] AT [facility]  [address] , [facility] [address]     Phone:  [phone]     FLUoxetine 40 MG capsule          Primary Care Provider Office Phone # Fax #    Aston Perry [phone]       [address] [place] [address]        Equal Access to Services     CARLOS MENDOZA AH: Hadii aad ku hadasho Soomaali, waaxda luqadaha, qaybta kaalmada adeegyada, waxchrissy idiin haywojciechn rose salamanca. So [facility] [phone].    ATENCIÓN: Si habla español, tiene a sanchez disposición servicios gratuitos de asistencia lingüística. Llame al [phone].    We comply with applicable federal civil rights laws and [place] laws. We do not discriminate on the basis of race, color, national origin, age, disability, sex, sexual orientation, or gender identity.             Thank you!     Thank you for choosing PSYCHIATRY CLINIC  for your care. Our goal is always to provide you with excellent care. Hearing back from our patients is one way we can continue to improve our services. Please take a few minutes to complete the written survey that you may receive in the mail after your visit with us. Thank you!             Your Updated Medication List - Protect others around you: Learn how to safely use, store and throw away your medicines at www.disposemymeds.org.          This list is accurate as of: 10/19/17 11:59 PM.  Always use your most recent med list.                   Brand Name Dispense Instructions for use Diagnosis    albuterol 108 (90 BASE) MCG/ACT Inhaler    PROAIR HFA/PROVENTIL HFA/VENTOLIN HFA    6.7 g    Inhale 2 puffs into the lungs every 4 hours as needed for shortness of breath / dyspnea or wheezing    Acute bronchospasm       amLODIPine 5 MG tablet    NORVASC    90 tablet    Take 1 tablet (5 mg) by mouth daily    Acute chest pain       aspirin 81 MG tablet      Take by mouth daily        atorvastatin 20 MG tablet    LIPITOR    90 tablet    Take 1 tablet (20 mg) by mouth daily You are rox to see your Cardiologist call [phone] to schedule.    NSTEMI (non-ST elevated myocardial infarction) (H)       azithromycin 250 MG tablet    ZITHROMAX    6 tablet    Two tablets first day, then one tablet daily for four days.    Cough       BETA BLOCKER NOT PRESCRIBED (INTENTIONAL)      Beta Blocker not prescribed intentionally due to Bradycardia < 50 bpm without beta blocker therapy    NSTEMI (non-ST elevated myocardial infarction) (H)       calcium citrate-vitamin D 315-200 MG-UNIT Tabs per tablet    CITRACAL     Take 1 tablet by mouth daily.        clopidogrel 75 MG tablet    PLAVIX    90 tablet    Take 1 tablet (75 mg) by mouth daily    NSTEMI (non-ST elevated myocardial infarction) (H)       entecavir 0.5 MG tablet    BARACLUDE    30 tablet    TAKE 1 TABLET BY MOUTH EVERY DAY     Hepatitis B virus infection       FLUoxetine 40 MG capsule    PROzac    90 capsule    Take 1 capsule (40 mg) by mouth every morning    Major depressive disorder, recurrent episode, moderate (H)       ibuprofen 200 MG tablet    ADVIL/MOTRIN     Take 200 mg by mouth every 4 hours as needed for mild pain    Bronchitis, Essential hypertension, Coronary artery disease involving native heart without angina pectoris, unspecified vessel or lesion type       isosorbide mononitrate 30 MG 24 hr tablet    IMDUR    45 tablet    Take 0.5 tablets (15 mg) by mouth daily    Acute chest pain       latanoprost 0.005 % ophthalmic solution    XALATAN    3 Bottle    Place 1 drop into both eyes At Bedtime    Borderline glaucoma with ocular hypertension, bilateral       losartan 50 MG tablet    COZAAR    90 tablet    Take 1 tablet (50 mg) by mouth daily    HTN (hypertension)       MULTIVITAMIN/IRON PO      Take 1 tablet by mouth daily        mycophenolate 250 MG capsule    GENERIC EQUIVALENT    180 capsule    Take 3 capsules (750 mg) by mouth 2 times daily    Kidney transplanted       OMEGA 3 PO      Take 1 capsule by mouth daily        omeprazole 20 MG tablet    priLOSEC OTC    30 tablet    Take  by mouth daily.    Chronic hepatitis B (H), HTN (hypertension), Depression, Unspecified essential hypertension       * predniSONE 5 MG tablet    DELTASONE    90 tablet    Take 1 tablet (5 mg) by mouth daily    Kidney transplanted       * predniSONE 20 MG tablet    DELTASONE    5 tablet    Take 1 tablet (20 mg) by mouth daily    Acute bronchospasm       TYLENOL 325 MG tablet   Generic drug:  acetaminophen      Take 1-2 tablets by mouth every 6 hours as needed.        * Notice:  This list has 2 medication(s) that are the same as other medications prescribed for you. Read the directions carefully, and ask your doctor or other care provider to review them with you.

## 2017-10-20 ASSESSMENT — PATIENT HEALTH QUESTIONNAIRE - PHQ9: SUM OF ALL RESPONSES TO PHQ QUESTIONS 1-9: 11

## 2017-11-06 ENCOUNTER — APPOINTMENT (OUTPATIENT)
Dept: OPHTHALMOLOGY | Facility: CLINIC | Age: 57
End: 2017-11-06
Attending: OPHTHALMOLOGY
Payer: MEDICARE

## 2017-11-06 DIAGNOSIS — H40.053 BORDERLINE GLAUCOMA WITH OCULAR HYPERTENSION, BILATERAL: ICD-10-CM

## 2017-11-06 DIAGNOSIS — H40.9 GLAUCOMA: Primary | ICD-10-CM

## 2017-11-06 PROCEDURE — 99212 OFFICE O/P EST SF 10 MIN: CPT | Mod: ZF

## 2017-11-06 PROCEDURE — 92083 EXTENDED VISUAL FIELD XM: CPT | Mod: ZF | Performed by: OPHTHALMOLOGY

## 2017-11-06 RX ORDER — LATANOPROST 50 UG/ML
1 SOLUTION/ DROPS OPHTHALMIC AT BEDTIME
Qty: 3 BOTTLE | Refills: 3 | Status: SHIPPED | OUTPATIENT
Start: 2017-11-06 | End: 2019-09-11

## 2017-11-06 ASSESSMENT — TONOMETRY
IOP_METHOD: APPLANATION
OD_IOP_MMHG: 20
OS_IOP_MMHG: 21

## 2017-11-06 ASSESSMENT — CONF VISUAL FIELD: OS_NORMAL: 1

## 2017-11-06 ASSESSMENT — SLIT LAMP EXAM - LIDS
COMMENTS: NORMAL
COMMENTS: NORMAL

## 2017-11-06 ASSESSMENT — CUP TO DISC RATIO
OD_RATIO: 0.6
OS_RATIO: 0.5

## 2017-11-06 ASSESSMENT — VISUAL ACUITY
METHOD: SNELLEN - LINEAR
OS_SC: 20/40
OS_PH_SC: 20/30
OD_SC: 20/400ECC
OD_PH_SC: 20/300

## 2017-11-06 NOTE — PROGRESS NOTES
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   PC intraocular lens (not extremely nearsighted preop)   4/2010 6/2010    Latanoprost both eyes at bedtime     Worked in health administration, semi-retired/disability    Octopus visual field both eyes initial Octopus (11/6/17)   Right eye LVC with cecocentral scotoma, stable   Left eye superior and inferior arcuate, stable    Impression  Ocular hypertension, possible glaucoma with Anterior ischemic optic neuropathy (AION) overlay  OCT very thin, not helpful for follow-up except nerve rim may be useful in the future since it is much better than retinal nerve fiber layer   Would treat intraocular pressure since we cannot be sure if there is a visual field component of glaucomatous loss    Plan  Continue latanoprost both eyes   Return to clinic 6 months with dilated exam (yearly LVC right eye and 24-2 left eye Nov 2018)    Attending Physician Attestation:  Complete documentation of historical and exam elements from today's encounter can be found in the full encounter summary report (not reduplicated in this progress note). I personally obtained the chief complaint(s) and history of present illness. I confirmed and edited asnecessary the review of systems, past medical/surgical history, family history, social history, and examination findings as documented by others; and I examined the patient myself. I personally reviewed the relevant tests, images, and reports as documented above. I formulated and edited as necessary the assessment and plan and discussed the findings and management plan with the patient and family.  - Viki Rizzo MD 9:55 AM 11/6/2017

## 2017-11-06 NOTE — NURSING NOTE
Chief Complaints and History of Present Illnesses   Patient presents with     Follow Up For     6 months follow up Glaucoma     HPI    Affected eye(s):  Both   Symptoms:     Floaters   No flashes   No glare   No halos (Comment: at times LE)      Frequency:  Constant       Do you have eye pain now?:  No      Comments:  6 month follow up  Pt reports no changes  Latanoprost qd BE last taken 8:30PM yesterday  Bethany CASTILLO 9:13 AM November 6, 2017

## 2017-11-06 NOTE — MR AVS SNAPSHOT
After Visit Summary   11/6/2017    Jerad Ross    MRN: [id number]           Patient Information     Date Of Birth          1960        Visit Information        Provider Department      11/6/2017 9:15 AM Viki Rizzo MD Eye Clinic        Today's Diagnoses     Glaucoma    -  1    Borderline glaucoma with ocular hypertension, bilateral           Follow-ups after your visit        Follow-up notes from your care team     Return in about 6 months (around 5/6/2018) for dilation.      Your next 10 appointments already scheduled     Nov 07, 2017  9:00 AM CST   US ABDOMEN LIMITED with UCUS3   [facility] US ([facility])    [address] [place] [address]  1st [facility] [address] [phone]           Please bring a list of your medicines (including vitamins, minerals and over-the-counter drugs). Also, tell your doctor about any allergies you may have. Wear comfortable clothes and leave your valuables at home.  Adults: No eating or drinking for 8 hours before the exam. You may take medicine with a small sip of water.  Children: - Children 6+ years: No food or drink for 6 hours before exam. - Children 1-5 years: No food or drink for 4 hours before exam. - Infants, breast-fed: may have breast milk up to 2 hours before exam. - Infants, formula: may have bottle until 4 hours before exam.  Please call the Imaging Department at your exam site with any questions.            Nov 07, 2017 10:20 AM CST   (Arrive by 10:05 AM)   Return Visit with Aston Perry MD   [facility] ([facility])    [address] [place] [address]  4th [facility] [address] [phone]            Jan 18, 2018 12:30 PM CST   Adult Med Follow UP with RONALDO Dempsey CNP   Psychiatry Clinic ([facility])    [address]  [address] [facility] [address] [phone]            Mar 13,  2018  9:30 AM CDT   Lab with  LAB   [facility] Lab ([facility])    [address]  1st Floor  [facility] [address] [phone]            Mar 13, 2018 10:30 AM CDT   (Arrive by 10:15 AM)   Return General Liver with Lina Claros MD   [facility] Hepatology ([facility])    [address]  3rd Floor  [facility] [address] [phone]            May 07, 2018  8:30 AM CDT   RETURN GLAUCOMA with Viki Rizzo MD   Eye Clinic ([facility])    [address]  [address] [facility]  9th Fl Clin 9a  [facility] [address] [phone]            Jun 12, 2018  1:30 PM CDT   (Arrive by 1:00 PM)   Return Kidney Transplant with Nolan Lovett MD   [facility] Nephrology ([facility])    [address]  3rd [facility] [address] [phone]              Who to contact     Please call your clinic at [phone] to:    Ask questions about your health    Make or cancel appointments    Discuss your medicines    Learn about your test results    Speak to your doctor   If you have compliments or concerns about an experience at your clinic, or if you wish to file a complaint, please contact [facility] Patient Relations at [phone] or email us at [email or web address]         Additional Information About Your Visit        [employer] Information     [employer] gives you secure access to your electronic health record. If you see a primary care provider, you can also send messages to your care team and make appointments. If you have questions, please call your primary care clinic.  If you do not have a primary care provider, please call [phone] and they will assist you.      [employer] is an electronic gateway that provides easy, online access to your medical records. With [employer], you can request a clinic appointment, read your test results, renew a  prescription or communicate with your care team.     To access your existing account, please contact your [facility] or call [phone] for assistance.        Care EveryWhere ID     This is your Care EveryWhere ID. This could be used by other organizations to access your [facility] medical records  [id number]         Blood Pressure from Last 3 Encounters:   10/06/17 116/77   09/20/17 105/71   09/12/17 117/80    Weight from Last 3 Encounters:   10/06/17 84.8 kg (187 lb)   09/20/17 84.1 kg (185 lb 6.4 oz)   09/12/17 83.8 kg (184 lb 12.8 oz)              We Performed the Following     Low Vision Central OD     OVF 24-2 Dynamic OS          Where to get your medicines      These medications were sent to [facility] [address] [place] [address] AVE [address] AT [facility]  [address] , [facility] [address]     Phone:  [phone]     latanoprost 0.005 % ophthalmic solution          Primary Care Provider Office Phone # Fax #    Aston Perry [phone]       [address] [facility] [address]  [facility] [address]        Equal Access to Services     CARLOS MENDOZA AH: Hadii aad ku hadasho Soomaali, waaxda luqadaha, qaybta kaalmada adeegyada, waxay idiin haywojciechn rose salamanca. So [facility] [phone].    ATENCIÓN: Si habla español, tiene a sanchez disposición servicios gratuitos de asistencia lingüística. Lljudith al [phone].    We comply with applicable federal civil rights laws and [place] laws. We do not discriminate on the basis of race, color, national origin, age, disability, sex, sexual orientation, or gender identity.            Thank you!     Thank you for choosing EYE CLINIC  for your care. Our goal is always to provide you with excellent care. Hearing back from our patients is one way we can continue to improve our services. Please take a few minutes to complete the written survey that you may receive in the mail  after your visit with us. Thank you!             Your Updated Medication List - Protect others around you: Learn how to safely use, store and throw away your medicines at www.disposemymeds.org.          This list is accurate as of: 11/6/17 10:06 AM.  Always use your most recent med list.                   Brand Name Dispense Instructions for use Diagnosis    albuterol 108 (90 BASE) MCG/ACT Inhaler    PROAIR HFA/PROVENTIL HFA/VENTOLIN HFA    6.7 g    Inhale 2 puffs into the lungs every 4 hours as needed for shortness of breath / dyspnea or wheezing    Acute bronchospasm       amLODIPine 5 MG tablet    NORVASC    90 tablet    Take 1 tablet (5 mg) by mouth daily    Acute chest pain       aspirin 81 MG tablet      Take by mouth daily        atorvastatin 20 MG tablet    LIPITOR    90 tablet    Take 1 tablet (20 mg) by mouth daily You are rox to see your Cardiologist call [phone] to schedule.    NSTEMI (non-ST elevated myocardial infarction) (H)       azithromycin 250 MG tablet    ZITHROMAX    6 tablet    Two tablets first day, then one tablet daily for four days.    Cough       BETA BLOCKER NOT PRESCRIBED (INTENTIONAL)      Beta Blocker not prescribed intentionally due to Bradycardia < 50 bpm without beta blocker therapy    NSTEMI (non-ST elevated myocardial infarction) (H)       calcium citrate-vitamin D 315-200 MG-UNIT Tabs per tablet    CITRACAL     Take 1 tablet by mouth daily.        clopidogrel 75 MG tablet    PLAVIX    90 tablet    Take 1 tablet (75 mg) by mouth daily    NSTEMI (non-ST elevated myocardial infarction) (H)       entecavir 0.5 MG tablet    BARACLUDE    30 tablet    TAKE 1 TABLET BY MOUTH EVERY DAY    Hepatitis B virus infection       FLUoxetine 40 MG capsule    PROzac    90 capsule    Take 1 capsule (40 mg) by mouth every morning    Major depressive disorder, recurrent episode, moderate (H)       ibuprofen 200 MG tablet    ADVIL/MOTRIN     Take 200 mg by mouth every 4 hours as needed for mild  pain    Bronchitis, Essential hypertension, Coronary artery disease involving native heart without angina pectoris, unspecified vessel or lesion type       isosorbide mononitrate 30 MG 24 hr tablet    IMDUR    45 tablet    Take 0.5 tablets (15 mg) by mouth daily    Acute chest pain       latanoprost 0.005 % ophthalmic solution    XALATAN    3 Bottle    Place 1 drop into both eyes At Bedtime    Borderline glaucoma with ocular hypertension, bilateral       losartan 50 MG tablet    COZAAR    90 tablet    Take 1 tablet (50 mg) by mouth daily    HTN (hypertension)       MULTIVITAMIN/IRON PO      Take 1 tablet by mouth daily        mycophenolate 250 MG capsule    GENERIC EQUIVALENT    180 capsule    Take 3 capsules (750 mg) by mouth 2 times daily    Kidney transplanted       OMEGA 3 PO      Take 1 capsule by mouth daily        omeprazole 20 MG tablet    priLOSEC OTC    30 tablet    Take  by mouth daily.    Chronic hepatitis B (H), HTN (hypertension), Depression, Unspecified essential hypertension       * predniSONE 5 MG tablet    DELTASONE    90 tablet    Take 1 tablet (5 mg) by mouth daily    Kidney transplanted       * predniSONE 20 MG tablet    DELTASONE    5 tablet    Take 1 tablet (20 mg) by mouth daily    Acute bronchospasm       TYLENOL 325 MG tablet   Generic drug:  acetaminophen      Take 1-2 tablets by mouth every 6 hours as needed.        * Notice:  This list has 2 medication(s) that are the same as other medications prescribed for you. Read the directions carefully, and ask your doctor or other care provider to review them with you.

## 2017-11-07 ENCOUNTER — OFFICE VISIT (OUTPATIENT)
Dept: INTERNAL MEDICINE | Facility: CLINIC | Age: 57
End: 2017-11-07

## 2017-11-07 VITALS
HEART RATE: 67 BPM | TEMPERATURE: 97.8 F | WEIGHT: 185.9 LBS | DIASTOLIC BLOOD PRESSURE: 81 MMHG | OXYGEN SATURATION: 95 % | SYSTOLIC BLOOD PRESSURE: 121 MMHG | BODY MASS INDEX: 28.27 KG/M2

## 2017-11-07 DIAGNOSIS — M89.9 DISORDER OF BONE AND CARTILAGE: ICD-10-CM

## 2017-11-07 DIAGNOSIS — R05.9 COUGH: ICD-10-CM

## 2017-11-07 DIAGNOSIS — M94.9 DISORDER OF BONE AND CARTILAGE: ICD-10-CM

## 2017-11-07 DIAGNOSIS — R53.83 FATIGUE, UNSPECIFIED TYPE: ICD-10-CM

## 2017-11-07 DIAGNOSIS — R53.83 FATIGUE, UNSPECIFIED TYPE: Primary | ICD-10-CM

## 2017-11-07 DIAGNOSIS — I10 HYPERTENSION, UNSPECIFIED TYPE: ICD-10-CM

## 2017-11-07 LAB
CRP SERPL-MCNC: 10.3 MG/L (ref 0–8)
DEPRECATED CALCIDIOL+CALCIFEROL SERPL-MC: 28 UG/L (ref 20–75)
ERYTHROCYTE [SEDIMENTATION RATE] IN BLOOD BY WESTERGREN METHOD: 27 MM/H (ref 0–20)
MAGNESIUM SERPL-MCNC: 1.8 MG/DL (ref 1.6–2.3)
TSH SERPL DL<=0.005 MIU/L-ACNC: 1.73 MU/L (ref 0.4–4)

## 2017-11-07 PROCEDURE — 82306 VITAMIN D 25 HYDROXY: CPT | Performed by: INTERNAL MEDICINE

## 2017-11-07 PROCEDURE — 84403 ASSAY OF TOTAL TESTOSTERONE: CPT | Performed by: INTERNAL MEDICINE

## 2017-11-07 PROCEDURE — 84270 ASSAY OF SEX HORMONE GLOBUL: CPT | Performed by: INTERNAL MEDICINE

## 2017-11-07 RX ORDER — LOSARTAN POTASSIUM 50 MG/1
25 TABLET ORAL DAILY
Qty: 90 TABLET | Refills: 3 | COMMUNITY
Start: 2017-11-07 | End: 2017-12-05

## 2017-11-07 RX ORDER — MONTELUKAST SODIUM 10 MG/1
10 TABLET ORAL AT BEDTIME
Qty: 30 TABLET | Refills: 3 | Status: SHIPPED | OUTPATIENT
Start: 2017-11-07 | End: 2018-11-04

## 2017-11-07 ASSESSMENT — PAIN SCALES - GENERAL: PAINLEVEL: NO PAIN (0)

## 2017-11-07 NOTE — NURSING NOTE
Chief Complaint   Patient presents with     Fatigue     Patient here for cough and fatigue.     Britany Love LPN at 11:07 AM on 11/7/2017.

## 2017-11-07 NOTE — PROGRESS NOTES
HPI  57-year-old presents today for evaluation of cough and fatigue. He reports upper respiratory infection about a month ago. Since that time he's had persistent cough. This is largely nonproductive it's worse in the morning is not associated with any fever chills or sweats. He's also had significant fatigue although the fatigue predated the cough conceivably associated with an adjustment of his immunosuppression earlier this summer. He is notified the transplant of this but there has been no additional adjustments or improvement in the symptoms. He denies any red hot or swollen joints he's not had any associated dizziness lightheadedness or other complaints. Otherwise he seems to be doing reasonably well.    Past and Family hx reviewed and updated    Past Medical History:   Diagnosis Date     AION (acute ischemic optic neuropathy)      Anemia in chronic renal disease      Avascular necrosis of bones of both hips (H)     s/p bilateral hip replacements     Basal cell carcinoma      CAD (coronary artery disease) 6/17/2014     Chronic hepatitis B (H)      Clostridium difficile colitis      CRP elevated      Depression      Diverticulosis      Dyslipidemia      FSGS (focal segmental glomerulosclerosis)      Gastric ulcer with hemorrhage 2/12/12     GERD (gastroesophageal reflux disease)      Glaucoma     OHTN     Hemorrhoids      Hypertension secondary to other renal disorders      Hypogonadism in male      Immunosuppressed status (H)      Kidney replaced by transplant     focal glomerulosclerosis      NSTEMI (non-ST elevated myocardial infarction) (H) 6/17/2014     JULIANE (obstructive sleep apnea)     Doesn't use CPAP     Paracentral scotoma     LE     Secondary renal hyperparathyroidism (H)      Squamous cell carcinoma      Past Surgical History:   Procedure Laterality Date     APPENDECTOMY       CATARACT IOL, RT/LT  4/19/2000    RE     CATARACT IOL, RT/LT  6/1/2000    LE     COLECTOMY SUBTOTAL  1983    10 cm,  diverticulitis     COLONOSCOPY  2/13/2012    Procedure:COLONOSCOPY; Surgeon:SLOAN GALLARDO; Location:[facility]     ESOPHAGOSCOPY, GASTROSCOPY, DUODENOSCOPY (EGD), COMBINED  2/13/2012    Procedure:COMBINED ESOPHAGOSCOPY, GASTROSCOPY, DUODENOSCOPY (EGD); Surgeon:SLOAN GALLARDO; Location:[facility]     EXTRACAPSULAR CATARACT EXTRATION WITH INTRAOCULAR LENS IMPLANT  4-20-10, 6-1-10    Rt, Lt     HERNIA REPAIR  1995    Lt inguinal     HIP SURGERY      1981, bilat MITZI, revised 2001, 2005     KIDNEY SURGERY  1978 and 1993    transplant     KNEE SURGERY  1983, 1987    bilat TKA     MOHS MICROGRAPHIC PROCEDURE       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, auxiliary spleen     TONSILLECTOMY       Family History   Problem Relation Age of Onset     Cardiovascular Father      AI with valve repair     Hypertension Father      KIDNEY DISEASE Father      CANCER Paternal Grandmother      ovarian ca     CEREBROVASCULAR DISEASE Paternal Grandfather      CEREBROVASCULAR DISEASE Maternal Grandfather      KIDNEY DISEASE Paternal Aunt      Skin Cancer No family hx of      Glaucoma No family hx of      Macular Degeneration No family hx of      Amblyopia No family hx of      Social History     Social History     Marital status:      Spouse name: N/A     Number of children: 0     Years of education: N/A     Occupational History      Disabled      [employer]     Social History Main Topics     Smoking status: Former Smoker     Packs/day: 1.00     Years: 9.00     Quit date: 1/1/1988     Smokeless tobacco: Former User     Alcohol use 0.0 oz/week     0 Standard drinks or equivalent per week      Comment: rarely 1 drink per month     Drug use: No     Sexual activity: Not Asked     Other Topics Concern     Special Diet No     Exercise Yes     walks     Social History Narrative    . Wife is also a kidney transplant recipient.     Works part-time     Complete review of symptoms negative except as noted  above.    Exam:  /81  Pulse 67  Temp 97.8  F (36.6  C) (Oral)  Wt 84.3 kg (185 lb 14.4 oz)  SpO2 95%  BMI 28.27 kg/m2  185 lbs 14.4 oz  Physical Exam   The patient is alert, oriented with a clear sensorium.   Skin shows no lesions or rashes and good turgor.   Head is normocephalic and atraumatic.   Ears show normal TMs bilaterally.   Mouth shows clear oral mucosa.   Neck shows no nodes, thyromegaly or bruits.   Back is non tender.  Lungs are clear to percussion and auscultation.   Heart shows normal S1 and S2 without murmur or gallop.   Extremities show no edema and no evidence of active synovitis.   Neurologic examination non-focal    ASSESSMENT  1 post bronchitic cough  2 hypertension well controlled on decreased losartan  3 status post kidney transplant  4 fatigue likely multifactorial  5 hypogonadism we'll reevaluate    Plan  In light of his symptoms will assess additional lab parameters including his testosterone thyroid and vitamin D. We'll treat the cough with montelukast q.d.  Reduced the dose of the losartan to 25 mg and have him follow-up if no improvement in the next couple weeks    This note was completed using Dragon voice recognition software.  Although reviewed after completion, some word and grammatical errors may occur.    Aston Perry MD  General Internal Medicine  [facility]  [phone]

## 2017-11-07 NOTE — MR AVS SNAPSHOT
After Visit Summary   11/7/2017    Jerad Ross    MRN: [id number]           Patient Information     Date Of Birth          1960        Visit Information        Provider Department      11/7/2017 10:20 AM Aston Perry MD [facility]        Today's Diagnoses     Fatigue, unspecified type    -  1    Hypertension, unspecified type        Disorder of bone and cartilage        Cough           Follow-ups after your visit        Your next 10 appointments already scheduled     Jan 18, 2018 12:30 PM CST   Adult Med Follow UP with RONALDO Dempsey CNP   Psychiatry Clinic ([facility])    [place] [id number]  [address] [facility] [address]   [phone]            Mar 13, 2018  9:30 AM CDT   Lab with  LAB   [facility])    [address] [place] [address]  [address] [facility] [address]   [phone]            Mar 13, 2018 10:30 AM CDT   (Arrive by 10:15 AM)   Return General Liver with Lina Claros MD   [facility] Hepatology ([facility])    [address] [place] [address]  [address] [facility] [address] [phone]            May 07, 2018  8:30 AM CDT   RETURN GLAUCOMA with Viki Rizzo MD   Eye Clinic ([facility])    [address]  [address] [facility]  [address] [address]  [facility] [address]   [phone]            Jun 12, 2018  1:30 PM CDT   (Arrive by 1:00 PM)   Return Kidney Transplant with Nolan Lovett MD   [facility] Nephrology ([facility])    [address] [place] [address]  [address] [facility] [id number] [phone]              Future tests that were ordered for you today     Open Future Orders        Priority Expected Expires Ordered    Vitamin D Deficiency Routine  11/7/2018 11/7/2017    TSH with free T4 reflex Routine  11/7/2018 11/7/2017    Testosterone Free and Total Routine  11/7/2018  11/7/2017    CRP inflammation Routine  11/7/2018 11/7/2017    Erythrocyte sedimentation rate auto Routine  11/7/2018 11/7/2017    Magnesium Routine  11/7/2018 11/7/2017            Who to contact     Please call your clinic at [phone] to:    Ask questions about your health    Make or cancel appointments    Discuss your medicines    Learn about your test results    Speak to your doctor   If you have compliments or concerns about an experience at your clinic, or if you wish to file a complaint, please contact [facility] Patient Relations at [phone] or email us at [email or web address]         Additional Information About Your Visit        Del[employer] Information     Vputi gives you secure access to your electronic health record. If you see a primary care provider, you can also send messages to your care team and make appointments. If you have questions, please call your primary care clinic.  If you do not have a primary care provider, please call [phone] and they will assist you.      Vputi is an electronic gateway that provides easy, online access to your medical records. With Vputi, you can request a clinic appointment, read your test results, renew a prescription or communicate with your care team.     To access your existing account, please contact your [facility] or call [phone] for assistance.        Care EveryWhere ID     This is your Care EveryWhere ID. This could be used by other organizations to access your [facility] medical records  [id number]        Your Vitals Were     Pulse Temperature Pulse Oximetry BMI (Body Mass Index)          67 97.8  F (36.6  C) (Oral) 95% 28.27 kg/m2         Blood Pressure from Last 3 Encounters:   11/07/17 121/81   10/06/17 116/77   09/20/17 105/71    Weight from Last 3 Encounters:   11/07/17 84.3 kg (185 lb 14.4 oz)   10/06/17 84.8 kg (187 lb)   09/20/17 84.1 kg (185 lb 6.4 oz)                  Today's Medication Changes          These changes are accurate as of: 11/7/17 11:50 AM.  If you have any questions, ask your nurse or doctor.               Start taking these medicines.        Dose/Directions    montelukast 10 MG tablet   Commonly known as:  SINGULAIR   Used for:  Cough   Started by:  Aston Perry MD        Dose:  10 mg   Take 1 tablet (10 mg) by mouth At Bedtime   Quantity:  30 tablet   Refills:  3         These medicines have changed or have updated prescriptions.        Dose/Directions    losartan 50 MG tablet   Commonly known as:  COZAAR   This may have changed:  how much to take   Used for:  Hypertension, unspecified type   Changed by:  Aston Perry MD        Dose:  25 mg   Take 0.5 tablets (25 mg) by mouth daily   Quantity:  90 tablet   Refills:  3         Stop taking these medicines if you haven't already. Please contact your care team if you have questions.     azithromycin 250 MG tablet   Commonly known as:  ZITHROMAX   Stopped by:  Aston Perry MD                Where to get your medicines      These medications were sent to [facility] [address]  [address] [facility] [address], [facility] [address]    Hours:  TRANSPLANT PHONE NUMBER [phone] Phone:  [phone]     montelukast 10 MG tablet                Primary Care Provider Office Phone # Fax #    Aston Perry [phone]       [address] [facility] [address]  [facility] [address]        Equal Access to Services     CARLOS MENDOZA AH: Hadii yuli ku hadasho Soaristeo, waaxda luqadaha, qaybta kaalmada adeegyada, nish salamanca. So [facility] [phone].    ATENCIÓN: Si habla español, tiene a sanchez disposición servicios gratuitos de asistencia lingüística. Llame al [phone].    We comply with applicable federal civil rights laws and [place] laws. We do not discriminate on the basis of race,  color, national origin, age, disability, sex, sexual orientation, or gender identity.            Thank you!     Thank you for choosing [facility]  for your care. Our goal is always to provide you with excellent care. Hearing back from our patients is one way we can continue to improve our services. Please take a few minutes to complete the written survey that you may receive in the mail after your visit with us. Thank you!             Your Updated Medication List - Protect others around you: Learn how to safely use, store and throw away your medicines at www.disposemymeds.org.          This list is accurate as of: 11/7/17 11:50 AM.  Always use your most recent med list.                   Brand Name Dispense Instructions for use Diagnosis    albuterol 108 (90 BASE) MCG/ACT Inhaler    PROAIR HFA/PROVENTIL HFA/VENTOLIN HFA    6.7 g    Inhale 2 puffs into the lungs every 4 hours as needed for shortness of breath / dyspnea or wheezing    Acute bronchospasm       amLODIPine 5 MG tablet    NORVASC    90 tablet    Take 1 tablet (5 mg) by mouth daily    Acute chest pain       aspirin 81 MG tablet      Take by mouth daily        atorvastatin 20 MG tablet    LIPITOR    90 tablet    Take 1 tablet (20 mg) by mouth daily You are rox to see your Cardiologist call [phone] to schedule.    NSTEMI (non-ST elevated myocardial infarction) (H)       BETA BLOCKER NOT PRESCRIBED (INTENTIONAL)      Beta Blocker not prescribed intentionally due to Bradycardia < 50 bpm without beta blocker therapy    NSTEMI (non-ST elevated myocardial infarction) (H)       calcium citrate-vitamin D 315-200 MG-UNIT Tabs per tablet    CITRACAL     Take 1 tablet by mouth daily.        clopidogrel 75 MG tablet    PLAVIX    90 tablet    Take 1 tablet (75 mg) by mouth daily    NSTEMI (non-ST elevated myocardial infarction) (H)       entecavir 0.5 MG tablet    BARACLUDE    30 tablet    TAKE 1 TABLET BY MOUTH EVERY DAY    Hepatitis B virus  infection       FLUoxetine 40 MG capsule    PROzac    90 capsule    Take 1 capsule (40 mg) by mouth every morning    Major depressive disorder, recurrent episode, moderate (H)       ibuprofen 200 MG tablet    ADVIL/MOTRIN     Take 200 mg by mouth every 4 hours as needed for mild pain    Bronchitis, Essential hypertension, Coronary artery disease involving native heart without angina pectoris, unspecified vessel or lesion type       isosorbide mononitrate 30 MG 24 hr tablet    IMDUR    45 tablet    Take 0.5 tablets (15 mg) by mouth daily    Acute chest pain       latanoprost 0.005 % ophthalmic solution    XALATAN    3 Bottle    Place 1 drop into both eyes At Bedtime    Borderline glaucoma with ocular hypertension, bilateral       losartan 50 MG tablet    COZAAR    90 tablet    Take 0.5 tablets (25 mg) by mouth daily    Hypertension, unspecified type       montelukast 10 MG tablet    SINGULAIR    30 tablet    Take 1 tablet (10 mg) by mouth At Bedtime    Cough       MULTIVITAMIN/IRON PO      Take 1 tablet by mouth daily        mycophenolate 250 MG capsule    GENERIC EQUIVALENT    180 capsule    Take 3 capsules (750 mg) by mouth 2 times daily    Kidney transplanted       OMEGA 3 PO      Take 1 capsule by mouth daily        omeprazole 20 MG tablet    priLOSEC OTC    30 tablet    Take  by mouth daily.    Chronic hepatitis B (H), HTN (hypertension), Depression, Unspecified essential hypertension       * predniSONE 5 MG tablet    DELTASONE    90 tablet    Take 1 tablet (5 mg) by mouth daily    Kidney transplanted       * predniSONE 20 MG tablet    DELTASONE    5 tablet    Take 1 tablet (20 mg) by mouth daily    Acute bronchospasm       TYLENOL 325 MG tablet   Generic drug:  acetaminophen      Take 1-2 tablets by mouth every 6 hours as needed.        * Notice:  This list has 2 medication(s) that are the same as other medications prescribed for you. Read the directions carefully, and ask your doctor or other care provider to  review them with you.

## 2017-11-08 DIAGNOSIS — I21.4 NSTEMI (NON-ST ELEVATED MYOCARDIAL INFARCTION) (H): ICD-10-CM

## 2017-11-09 LAB
SHBG SERPL-SCNC: 45 NMOL/L (ref 11–80)
TESTOST FREE SERPL-MCNC: 5.62 NG/DL (ref 4.7–24.4)
TESTOST SERPL-MCNC: 344 NG/DL (ref 240–950)

## 2017-11-09 RX ORDER — CLOPIDOGREL BISULFATE 75 MG/1
75 TABLET ORAL DAILY
Qty: 30 TABLET | Refills: 2 | Status: SHIPPED | OUTPATIENT
Start: 2017-11-09 | End: 2018-04-20

## 2017-12-05 DIAGNOSIS — I10 HYPERTENSION, UNSPECIFIED TYPE: ICD-10-CM

## 2017-12-05 RX ORDER — LOSARTAN POTASSIUM 50 MG/1
25 TABLET ORAL DAILY
Qty: 90 TABLET | Refills: 3 | Status: SHIPPED | OUTPATIENT
Start: 2017-12-05 | End: 2018-11-04

## 2017-12-14 DIAGNOSIS — R07.9 ACUTE CHEST PAIN: ICD-10-CM

## 2017-12-18 RX ORDER — ISOSORBIDE MONONITRATE 30 MG/1
15 TABLET, EXTENDED RELEASE ORAL DAILY
Qty: 45 TABLET | Refills: 0 | Status: SHIPPED | OUTPATIENT
Start: 2017-12-18 | End: 2018-03-28

## 2018-01-12 ENCOUNTER — TELEPHONE (OUTPATIENT)
Dept: GASTROENTEROLOGY | Facility: CLINIC | Age: 58
End: 2018-01-12

## 2018-01-12 NOTE — TELEPHONE ENCOUNTER
----- Message from Carolynn Jensen sent at 1/12/2018  1:40 PM CST -----  Regarding: DX Verif Form - Dr Claros  Contact: [phone]  Marco Antonio from [facility] is calling about a DX Verification Form that she needs completed by Dr Claros. It has been faxed (I verified the fax #) and Marco Antonio is waiting on a response.  Please complete the form verifying the pt's dx for the meds he is taking, sign and date, and fax back per the form request.    Please call [phone] and Ask for Marco Antonio to let them know the status.    Thanks - Carolynn    Please DO NOT send this message and/or reply back to sender.  Call Center Representatives DO NOT respond to messages.    .

## 2018-01-18 ENCOUNTER — OFFICE VISIT (OUTPATIENT)
Dept: PSYCHIATRY | Facility: CLINIC | Age: 58
End: 2018-01-18
Attending: NURSE PRACTITIONER
Payer: MEDICARE

## 2018-01-18 VITALS
DIASTOLIC BLOOD PRESSURE: 81 MMHG | SYSTOLIC BLOOD PRESSURE: 122 MMHG | BODY MASS INDEX: 28.46 KG/M2 | HEART RATE: 80 BPM | WEIGHT: 187.2 LBS

## 2018-01-18 DIAGNOSIS — F33.1 MAJOR DEPRESSIVE DISORDER, RECURRENT EPISODE, MODERATE (H): ICD-10-CM

## 2018-01-18 PROCEDURE — G0463 HOSPITAL OUTPT CLINIC VISIT: HCPCS | Mod: ZF

## 2018-01-18 RX ORDER — FLUOXETINE 40 MG/1
40 CAPSULE ORAL EVERY MORNING
Qty: 90 CAPSULE | Refills: 0 | Status: SHIPPED | OUTPATIENT
Start: 2018-01-18 | End: 2018-06-21

## 2018-01-18 ASSESSMENT — PAIN SCALES - GENERAL: PAINLEVEL: NO PAIN (0)

## 2018-01-18 NOTE — PROGRESS NOTES
Outpatient Psychiatry Progress Note     Provider: RONALDO Lyon CNP  Date: 2018  Service:  Medication management.   Patient Identification: Jerad Ross  : 1960   MRN: [id number]    Jerad Ross is a 58 year old male who presents for ongoing psychiatric care.  Jerad was last seen in clinic on 10/19/17. At that time, he chose to continue fluoxetine 40mg daily.    2018  Today Jerad reports he is doing well.    - He had a period of one month of sobriety from his porn addiction before he relapsed. He continues in his SA group and discussed how he's processing for himself and working to help others do the same. Working to offer himself the same kindness and compassion he offers others.  - he's submitted several poems for publication. He's journaling about the process and bringing his concern for the writing process to God.  - he and his wife enjoy spending time with friends  - his wife is working to finish her LAD program    Compliance: daily  Side effects: none    Psychiatric Review of Systems:  Depression: denies including irritability  Anxiety: insignificant    Lethality: denies    Review of Medical Systems:  Appetite: OK, weight is stable  Sleep: sleeping 7-8 hours nightly, uses his Fitbit to track his sleep, wakes rested most days  Self Care: enjoys writing poetry, seeking a writing groups and considering a writing workshop, taking an online poetry course  Housework and hygiene: managing as is normal for him  Energy: intact  Concentration: intact    Current Substance Use:    Social History     Social History     Marital status:      Spouse name: N/A     Number of children: 0     Years of education: N/A     Occupational History      Disabled      [employer]     Social History Main Topics     Smoking status: Former Smoker     Packs/day: 1.00     Years: 9.00     Quit date: 1988     Smokeless tobacco: Former User     Alcohol use 0.0 oz/week     0  Standard drinks or equivalent per week      Comment: rarely 1 drink per month     Drug use: No     Sexual activity: Not Asked     Other Topics Concern     Special Diet No     Exercise Yes     walks     Social History Narrative    . Wife is also a kidney transplant recipient.     Works part-time     Limits coffee and Diet soda.    Past Medical History:   Past Medical History:   Diagnosis Date     AION (acute ischemic optic neuropathy)      Anemia in chronic renal disease      Avascular necrosis of bones of both hips (H)     s/p bilateral hip replacements     Basal cell carcinoma      CAD (coronary artery disease) 6/17/2014     Chronic hepatitis B (H)      Clostridium difficile colitis      CRP elevated      Depression      Diverticulosis      Dyslipidemia      FSGS (focal segmental glomerulosclerosis)      Gastric ulcer with hemorrhage 2/12/12     GERD (gastroesophageal reflux disease)      Glaucoma     OHTN     Hemorrhoids      Hypertension secondary to other renal disorders      Hypogonadism in male      Immunosuppressed status (H)      Kidney replaced by transplant     focal glomerulosclerosis      NSTEMI (non-ST elevated myocardial infarction) (H) 6/17/2014     JULIANE (obstructive sleep apnea)     Doesn't use CPAP     Paracentral scotoma     LE     Secondary renal hyperparathyroidism (H)      Squamous cell carcinoma      Medical health update: feels pretty good, intermittent angina, plans to follow up with cardiology    Allergies:   Allergies   Allergen Reactions     Penicillins Shortness Of Breath and Hives     Contrast Dye Nausea and Vomiting     Keflex [Cephalexin Hcl] Other (See Comments)     Pt could not recall reaction     Tetracycline Other (See Comments)     Patient could not recall reaction     Sulfa Drugs Rash        Current Medications     Current Outpatient Prescriptions Ordered in [facility]   Medication Sig Dispense Refill     isosorbide mononitrate (IMDUR) 30 MG 24 hr tablet Take 0.5 tablets (15 mg)  by mouth daily Please make a follow up appointment for further refills. 45 tablet 0     losartan (COZAAR) 50 MG tablet Take 0.5 tablets (25 mg) by mouth daily 90 tablet 3     clopidogrel (PLAVIX) 75 MG tablet Take 1 tablet (75 mg) by mouth daily Please make a follow visit for further refills. 30 tablet 2     montelukast (SINGULAIR) 10 MG tablet Take 1 tablet (10 mg) by mouth At Bedtime 30 tablet 3     latanoprost (XALATAN) 0.005 % ophthalmic solution Place 1 drop into both eyes At Bedtime 3 Bottle 3     FLUoxetine (PROZAC) 40 MG capsule Take 1 capsule (40 mg) by mouth every morning 90 capsule 0     entecavir (BARACLUDE) 0.5 MG tablet TAKE 1 TABLET BY MOUTH EVERY DAY 30 tablet 5     albuterol (PROAIR HFA/PROVENTIL HFA/VENTOLIN HFA) 108 (90 BASE) MCG/ACT Inhaler Inhale 2 puffs into the lungs every 4 hours as needed for shortness of breath / dyspnea or wheezing 6.7 g 1     predniSONE (DELTASONE) 20 MG tablet Take 1 tablet (20 mg) by mouth daily 5 tablet 0     amLODIPine (NORVASC) 5 MG tablet Take 1 tablet (5 mg) by mouth daily 90 tablet 1     mycophenolate (CELLCEPT - GENERIC EQUIVALENT) 250 MG capsule Take 3 capsules (750 mg) by mouth 2 times daily 180 capsule 11     predniSONE (DELTASONE) 5 MG tablet Take 1 tablet (5 mg) by mouth daily 90 tablet 3     ibuprofen (ADVIL,MOTRIN) 200 MG tablet Take 200 mg by mouth every 4 hours as needed for mild pain       atorvastatin (LIPITOR) 20 MG tablet Take 1 tablet (20 mg) by mouth daily You are rox to see your Cardiologist call [phone] to schedule. 90 tablet 1     BETA BLOCKER NOT PRESCRIBED, INTENTIONAL, Beta Blocker not prescribed intentionally due to Bradycardia < 50 bpm without beta blocker therapy  0     aspirin 81 MG tablet Take by mouth daily       Omega-3 Fatty Acids (OMEGA 3 PO) Take 1 capsule by mouth daily       omeprazole (PRILOSEC OTC) 20 MG tablet Take  by mouth daily. 30 tablet      calcium citrate-vitamin D (CITRACAL) 315-200 MG-UNIT TABS Take 1 tablet by  "mouth daily.       Multiple Vitamins-Iron (MULTIVITAMIN/IRON PO) Take 1 tablet by mouth daily        acetaminophen (TYLENOL) 325 MG tablet Take 1-2 tablets by mouth every 6 hours as needed.       No current Epic-ordered facility-administered medications on file.       Mental Status Exam     Appearance:  Casually dressed, Adequately groomed, Dressed appropriately for weather and Appears older than stated age  Behavior/relationship to examiner/demeanor:  Cooperative, Engaged and Pleasant  Orientation: Oriented to person, place, time and situation  Psychomotor: WNL  Speech Rate:  Normal  Speech Spontaneity:  Normal  Mood:  \"good\"  Affect:  Appropriate/mood-congruent and Restricted  Thought Process (Associations):  Logical, Linear and Goal directed  Thought Content:  no evidence of suicidal or homicidal ideation, no overt psychosis, denies suicidal ideation, intent or thoughts, patient denies auditory and visual hallucinations and patient does not appear to be responding to internal stimuli  Abnormal Perception:  None  Attention/Concentration:  Normal  Language:  Intact  Insight:  Fair  Judgment:  Good      Results     Vital signs: /81  Pulse 80  Wt 84.9 kg (187 lb 3.2 oz)  BMI 28.46 kg/m2    Laboratory Data:   Lab Results   Component Value Date    WBC 7.3 09/12/2017    HGB 12.5 (L) 09/12/2017    HCT 38.2 (L) 09/12/2017     09/12/2017    CHOL 136 01/20/2015    TRIG 106 01/20/2015    HDL 49 01/20/2015    ALT 24 09/12/2017    AST 20 09/12/2017     09/12/2017    BUN 14 09/12/2017    CO2 27 09/12/2017    TSH 1.73 11/07/2017    PSA 0.70 01/07/2011    INR 0.86 09/12/2017     Assessment & Plan     Jerad is seen today for follow up.    Diagnosis  Axis 1: recurrent MDD, moderate  Axis 2: none    Plan:  Medication: continue fluoxetine 40mg daily  OTC Recommendations: none  Other: none  Lab Orders: none; PCP follows INR; recent vit D low normal, TSH (1.73)  Referrals: none  Release of Information: " none  Future Treatment Considerations: none  Return for Follow Up: 3 months, sooner as needed    He voices understanding of the treatment plan including discussion of options, risks/ benefits. He has clinic contact information and will seek emergency services if urgent or life threatening symptoms present. Jerad understands risks associated with drug and alcohol use.     Visit was spent counseling the patient and/or coordinating care regarding review of social and occupational functioning.  In addition patient was counseled on health and wellness practices to augment medication treatment of symptoms. See note for details.    PHQ-9 score:  2  PHQ-9 SCORE 10/19/2017   Total Score -   Total Score 11   Some recent data might be hidden     GAD7: No flowsheet data found.  : 07/2017  Genia Fraser, RONALDO CNP 1/18/2018

## 2018-01-18 NOTE — NURSING NOTE
Chief Complaint   Patient presents with     Recheck Medication     MDD     Reviewed allergies, medications, pharmacy and smoking status.  Administered abuse screening questions     Obtained weight, pain level, blood pressure and heart rate

## 2018-01-18 NOTE — MR AVS SNAPSHOT
After Visit Summary   1/18/2018    Jerad Ross    MRN: [id number]           Patient Information     Date Of Birth          1960        Visit Information        Provider Department      1/18/2018 12:30 PM Genia Fraser APRN CNP Psychiatry Clinic        Today's Diagnoses     Major depressive disorder, recurrent episode, moderate (H)           Follow-ups after your visit        Follow-up notes from your care team     Return in about 12 weeks (around 4/12/2018), or if symptoms worsen or fail to improve, for Routine Visit.      Your next 10 appointments already scheduled     Mar 13, 2018  9:30 AM CDT   Lab with  LAB   [facility] ([facility])    [address]  1st Floor  [facility] [id number] [phone]            Mar 13, 2018 10:30 AM CDT   (Arrive by 10:15 AM)   Return General Liver with Lina Claros MD   [facility] Hepatology ([facility])    [address]  [address]  [place] [facility] [id number] [phone]            Apr 19, 2018 12:00 PM CDT   Adult Med Follow UP with RONALDO Dempsey CNP   Psychiatry Clinic ([facility])    [address]  [address] [facility] [id number] [phone]            May 07, 2018  8:30 AM CDT   RETURN GLAUCOMA with Viki Rizzo MD   Eye Clinic ([facility])    [address]  [address]  [facility] [address] [phone]            Jun 12, 2018  1:30 PM CDT   (Arrive by 1:00 PM)   Return Kidney Transplant with Nolan Lovett MD   [facility] Nephrology ([facility])    [address]  [address]  [place] [facility] [address] [phone]              Who to contact     Please call your clinic at [phone] to:    Ask questions about your health    Make or cancel appointments    Discuss your medicines    Learn about your test  results    Speak to your doctor   If you have compliments or concerns about an experience at your clinic, or if you wish to file a complaint, please contact [facility] Patient Relations at [phone] or email us at [email or web address]         Additional Information About Your Visit        MyChart Information     [employer] gives you secure access to your electronic health record. If you see a primary care provider, you can also send messages to your care team and make appointments. If you have questions, please call your primary care clinic.  If you do not have a primary care provider, please call [phone] and they will assist you.      The Exchange is an electronic gateway that provides easy, online access to your medical records. With The Exchange, you can request a clinic appointment, read your test results, renew a prescription or communicate with your care team.     To access your existing account, please contact your [facility] or call [phone] for assistance.        Care EveryWhere ID     This is your Care EveryWhere ID. This could be used by other organizations to access your [place] medical records  [id number]        Your Vitals Were     Pulse BMI (Body Mass Index)                80 28.46 kg/m2           Blood Pressure from Last 3 Encounters:   01/18/18 122/81   11/07/17 121/81   10/19/17 107/76    Weight from Last 3 Encounters:   01/18/18 84.9 kg (187 lb 3.2 oz)   11/07/17 84.3 kg (185 lb 14.4 oz)   10/19/17 83.6 kg (184 lb 6.4 oz)              Today, you had the following     No orders found for display         Where to get your medicines      These medications were sent to [facility] [address] [facility] [address] AT [facility] [address]  [address], [facility] [address]     Phone:  [phone]     FLUoxetine 40 MG capsule          Primary Care Provider Office Phone # Fax #    Xbtmy  Reji Perry [phone]       [address] [place] [address]        Equal Access to Services     CARLOS MENDOZA : Hadii aad ku hadkelleykenny Alarcon, millijosie morochogeneha, lilianamessi kajannetda lauri, nish holloway laEitantemi salamanca. So [facility] [phone].    ATENCIÓN: Si habla español, tiene a sanhcez disposición servicios gratuitos de asistencia lingüística. Llame al [phone].    We comply with applicable federal civil rights laws and [place] laws. We do not discriminate on the basis of race, color, national origin, age, disability, sex, sexual orientation, or gender identity.            Thank you!     Thank you for choosing PSYCHIATRY CLINIC  for your care. Our goal is always to provide you with excellent care. Hearing back from our patients is one way we can continue to improve our services. Please take a few minutes to complete the written survey that you may receive in the mail after your visit with us. Thank you!             Your Updated Medication List - Protect others around you: Learn how to safely use, store and throw away your medicines at www.disposemymeds.org.          This list is accurate as of 1/18/18 11:59 PM.  Always use your most recent med list.                   Brand Name Dispense Instructions for use Diagnosis    albuterol 108 (90 BASE) MCG/ACT Inhaler    PROAIR HFA/PROVENTIL HFA/VENTOLIN HFA    6.7 g    Inhale 2 puffs into the lungs every 4 hours as needed for shortness of breath / dyspnea or wheezing    Acute bronchospasm       amLODIPine 5 MG tablet    NORVASC    90 tablet    Take 1 tablet (5 mg) by mouth daily    Acute chest pain       aspirin 81 MG tablet      Take by mouth daily        atorvastatin 20 MG tablet    LIPITOR    90 tablet    Take 1 tablet (20 mg) by mouth daily You are rox to see your Cardiologist call [phone] to schedule.    NSTEMI (non-ST elevated myocardial infarction) (H)       BETA BLOCKER NOT PRESCRIBED (INTENTIONAL)      Beta  Blocker not prescribed intentionally due to Bradycardia < 50 bpm without beta blocker therapy    NSTEMI (non-ST elevated myocardial infarction) (H)       calcium citrate-vitamin D 315-200 MG-UNIT Tabs per tablet    CITRACAL     Take 1 tablet by mouth daily.        clopidogrel 75 MG tablet    PLAVIX    30 tablet    Take 1 tablet (75 mg) by mouth daily Please make a follow visit for further refills.    NSTEMI (non-ST elevated myocardial infarction) (H)       entecavir 0.5 MG tablet    BARACLUDE    30 tablet    TAKE 1 TABLET BY MOUTH EVERY DAY    Hepatitis B virus infection       FLUoxetine 40 MG capsule    PROzac    90 capsule    Take 1 capsule (40 mg) by mouth every morning    Major depressive disorder, recurrent episode, moderate (H)       ibuprofen 200 MG tablet    ADVIL/MOTRIN     Take 200 mg by mouth every 4 hours as needed for mild pain    Bronchitis, Essential hypertension, Coronary artery disease involving native heart without angina pectoris, unspecified vessel or lesion type       isosorbide mononitrate 30 MG 24 hr tablet    IMDUR    45 tablet    Take 0.5 tablets (15 mg) by mouth daily Please make a follow up appointment for further refills.    Acute chest pain       latanoprost 0.005 % ophthalmic solution    XALATAN    3 Bottle    Place 1 drop into both eyes At Bedtime    Borderline glaucoma with ocular hypertension, bilateral       losartan 50 MG tablet    COZAAR    90 tablet    Take 0.5 tablets (25 mg) by mouth daily    Hypertension, unspecified type       montelukast 10 MG tablet    SINGULAIR    30 tablet    Take 1 tablet (10 mg) by mouth At Bedtime    Cough       MULTIVITAMIN/IRON PO      Take 1 tablet by mouth daily        mycophenolate 250 MG capsule    GENERIC EQUIVALENT    180 capsule    Take 3 capsules (750 mg) by mouth 2 times daily    Kidney transplanted       OMEGA 3 PO      Take 1 capsule by mouth daily        omeprazole 20 MG tablet    priLOSEC OTC    30 tablet    Take  by mouth daily.     Chronic hepatitis B (H), HTN (hypertension), Depression, Unspecified essential hypertension       * predniSONE 5 MG tablet    DELTASONE    90 tablet    Take 1 tablet (5 mg) by mouth daily    Kidney transplanted       * predniSONE 20 MG tablet    DELTASONE    5 tablet    Take 1 tablet (20 mg) by mouth daily    Acute bronchospasm       TYLENOL 325 MG tablet   Generic drug:  acetaminophen      Take 1-2 tablets by mouth every 6 hours as needed.        * Notice:  This list has 2 medication(s) that are the same as other medications prescribed for you. Read the directions carefully, and ask your doctor or other care provider to review them with you.

## 2018-01-19 ASSESSMENT — PATIENT HEALTH QUESTIONNAIRE - PHQ9: SUM OF ALL RESPONSES TO PHQ QUESTIONS 1-9: 2

## 2018-01-24 ENCOUNTER — TRANSFERRED RECORDS (OUTPATIENT)
Dept: HEALTH INFORMATION MANAGEMENT | Facility: CLINIC | Age: 58
End: 2018-01-24

## 2018-01-30 ENCOUNTER — TRANSFERRED RECORDS (OUTPATIENT)
Dept: HEALTH INFORMATION MANAGEMENT | Facility: CLINIC | Age: 58
End: 2018-01-30

## 2018-02-12 DIAGNOSIS — R07.9 ACUTE CHEST PAIN: ICD-10-CM

## 2018-02-13 RX ORDER — ISOSORBIDE MONONITRATE 30 MG/1
15 TABLET, EXTENDED RELEASE ORAL DAILY
Qty: 45 TABLET | Refills: 0 | OUTPATIENT
Start: 2018-02-13

## 2018-02-15 ENCOUNTER — OFFICE VISIT (OUTPATIENT)
Dept: INTERNAL MEDICINE | Facility: CLINIC | Age: 58
End: 2018-02-15
Payer: MEDICARE

## 2018-02-15 ENCOUNTER — RADIANT APPOINTMENT (OUTPATIENT)
Dept: GENERAL RADIOLOGY | Facility: CLINIC | Age: 58
End: 2018-02-15
Attending: INTERNAL MEDICINE
Payer: MEDICARE

## 2018-02-15 VITALS
SYSTOLIC BLOOD PRESSURE: 111 MMHG | BODY MASS INDEX: 27.89 KG/M2 | DIASTOLIC BLOOD PRESSURE: 73 MMHG | TEMPERATURE: 98.1 F | RESPIRATION RATE: 20 BRPM | OXYGEN SATURATION: 95 % | WEIGHT: 183.4 LBS | HEART RATE: 75 BPM

## 2018-02-15 DIAGNOSIS — R91.8 PULMONARY NODULES: ICD-10-CM

## 2018-02-15 DIAGNOSIS — B96.89 BACTERIAL CONJUNCTIVITIS OF BOTH EYES: ICD-10-CM

## 2018-02-15 DIAGNOSIS — R50.81 FEVER IN OTHER DISEASES: Primary | ICD-10-CM

## 2018-02-15 DIAGNOSIS — J44.1 OBSTRUCTIVE CHRONIC BRONCHITIS WITH EXACERBATION (H): ICD-10-CM

## 2018-02-15 DIAGNOSIS — H10.9 BACTERIAL CONJUNCTIVITIS OF BOTH EYES: ICD-10-CM

## 2018-02-15 DIAGNOSIS — J20.9 BRONCHITIS WITH BRONCHOSPASM: ICD-10-CM

## 2018-02-15 DIAGNOSIS — R50.81 FEVER IN OTHER DISEASES: ICD-10-CM

## 2018-02-15 LAB
FLUAV+FLUBV RNA SPEC QL NAA+PROBE: NEGATIVE
FLUAV+FLUBV RNA SPEC QL NAA+PROBE: NEGATIVE
RSV RNA SPEC NAA+PROBE: NEGATIVE
SPECIMEN SOURCE: NORMAL

## 2018-02-15 PROCEDURE — 87631 RESP VIRUS 3-5 TARGETS: CPT | Performed by: INTERNAL MEDICINE

## 2018-02-15 RX ORDER — FLUTICASONE PROPIONATE 50 MCG
1-2 SPRAY, SUSPENSION (ML) NASAL DAILY
Qty: 3 BOTTLE | Refills: 11 | Status: SHIPPED | OUTPATIENT
Start: 2018-02-15 | End: 2020-08-18

## 2018-02-15 RX ORDER — MONTELUKAST SODIUM 10 MG/1
10 TABLET ORAL AT BEDTIME
Qty: 30 TABLET | Refills: 1 | Status: SHIPPED | OUTPATIENT
Start: 2018-02-15 | End: 2018-11-04

## 2018-02-15 RX ORDER — CIPROFLOXACIN HYDROCHLORIDE 3.5 MG/ML
2 SOLUTION/ DROPS TOPICAL 4 TIMES DAILY
Qty: 1 BOTTLE | Refills: 0 | Status: SHIPPED | OUTPATIENT
Start: 2018-02-15 | End: 2018-02-20

## 2018-02-15 ASSESSMENT — PAIN SCALES - GENERAL: PAINLEVEL: NO PAIN (0)

## 2018-02-15 NOTE — PATIENT INSTRUCTIONS
[facility]: [phone]     [facility] Medication Refill Request Information:  * Please contact your pharmacy regarding ANY request for medication refills.  ** [facility] Prescription Fax = [phone]  * Please allow 3 business days for routine medication refills.  * Please allow 5 business days for controlled substance medication refills.     [facility] Test Result notification information:  *You will be notified with in 7-10 days of your appointment day regarding the results of your test.  If you are on MyChart you will be notified as soon as the provider has reviewed the results and signed off on them.    Get a chest Xray today.  I will send you the results via [employer].     Use cipro eye drops 4 times daily for 5 days.      Use flonase and singulair for the next two weeks or longer if you choose.      Schedule PFTs on your way out.      Return to clinic if you have a fever or worsening symptoms.

## 2018-02-15 NOTE — NURSING NOTE
Chief Complaint   Patient presents with     Cough     pt here for cough     chest congestion     pt states his chest feels congested     Medication Request     pt would like to discuss medications     Adriana Gomez CMA at 3:21 PM on 2/15/2018.

## 2018-02-15 NOTE — PROGRESS NOTES
Rooming Note  Health Maintenance   Health Maintenance Due   Topic Date Due     DEPRESSION ACTION PLAN Q1 YR  01/16/1980     DEPRESSION SCREENING  12/03/2015     ADVANCE DIRECTIVE PLANNING Q5 YRS  01/16/2017    All health maintenance items UP TO DATE      Care Everywhere/JAZMINE obtained.

## 2018-02-15 NOTE — MR AVS SNAPSHOT
After Visit Summary   2/15/2018    Jerad Ross    MRN: [id number]           Patient Information     Date Of Birth          1960        Visit Information        Provider Department      2/15/2018 3:45 PM Stevo Ahuja MD [facility]        Today's Diagnoses     Fever in other diseases    -  1    Obstructive chronic bronchitis with exacerbation (H)        Bronchitis with bronchospasm        Bacterial conjunctivitis of both eyes          Care Instructions    Primary Care Center: [phone]     Primary Care Center Medication Refill Request Information:  * Please contact your pharmacy regarding ANY request for medication refills.  ** [facility] Prescription Fax = [phone]  * Please allow 3 business days for routine medication refills.  * Please allow 5 business days for controlled substance medication refills.     Primary Care Center Test Result notification information:  *You will be notified with in 7-10 days of your appointment day regarding the results of your test.  If you are on MyChart you will be notified as soon as the provider has reviewed the results and signed off on them.    Get a chest Xray today.  I will send you the results via [employer].     Use cipro eye drops 4 times daily for 5 days.      Use flonase and singulair for the next two weeks or longer if you choose.      Schedule PFTs on your way out.      Return to clinic if you have a fever or worsening symptoms.            Follow-ups after your visit        Follow-up notes from your care team     Return if symptoms worsen or fail to improve.      Your next 10 appointments already scheduled     Feb 15, 2018  5:40 PM CST   (Arrive by 5:25 PM)   XR CHEST 2 VIEWS with UCXR1   [facility] X[facility] ([facility])    [address] [address] [phone]           Please bring a list of your current medicines to your exam. (Include vitamins,  minerals and over-thecounter medicines.) Leave your valuables at home.  Tell your doctor if there is a chance you may be pregnant.  You do not need to do anything special for this exam.            Mar 13, 2018  9:30 AM CDT   Lab with  LAB   [facility] ([facility])    [address]  1st Floor  [facility] [id number] [phone]            Mar 13, 2018 10:30 AM CDT   (Arrive by 10:15 AM)   Return General Liver with Lina Claros MD   [facility] Hepatology ([facility])    [address]  [address]  [place] [id number] [phone]            Mar 13, 2018 12:30 PM CDT   FULL PULMONARY FUNCTION with  PFL C   [facility] Pulmonary Function Testing ([facility])    [address]  3rd Floor  [place] [facility] [id number] [phone]            Apr 13, 2018 12:30 PM CDT   Adult Med Follow UP with RONALDO Dempsey CNP   Psychiatry Clinic ([facility])    [address]  [address] [facility] [address]   [phone]            May 07, 2018  8:30 AM CDT   RETURN GLAUCOMA with Viki Rizzo MD   Eye Clinic ([facility])    [address]  [facility] [address]   [phone]            Jun 12, 2018  1:30 PM CDT   (Arrive by 1:00 PM)   Return Kidney Transplant with Nolan Lovett MD   [facility] Nephrology ([facility])    [address]  [address]  [place] [facility] [address] [phone]              Future tests that were ordered for you today     Open Future Orders        Priority Expected Expires Ordered    XR Chest 2 Views Routine 2/15/2018 2/15/2019 2/15/2018    General PFT Lab (Please always keep checked) Routine  2/15/2019 2/15/2018    Pulmonary Function Test Routine  2/15/2019 2/15/2018            Who to contact     Please call your clinic at [phone]  to:    Ask questions about your health    Make or cancel appointments    Discuss your medicines    Learn about your test results    Speak to your doctor            Additional Information About Your Visit        [employer] Medical Information     [employer] gives you secure access to your electronic health record. If you see a primary care provider, you can also send messages to your care team and make appointments. If you have questions, please call your primary care clinic.  If you do not have a primary care provider, please call [phone] and they will assist you.      [employer] is an electronic gateway that provides easy, online access to your medical records. With [employer], you can request a clinic appointment, read your test results, renew a prescription or communicate with your care team.     To access your existing account, please contact your [facility] or call [phone] for assistance.        Care EveryWhere ID     This is your Care EveryWhere ID. This could be used by other organizations to access your [place] medical records  [id number]        Your Vitals Were     Pulse Temperature Respirations Pulse Oximetry BMI (Body Mass Index)       75 98.1  F (36.7  C) (Oral) 20 95% 27.89 kg/m2        Blood Pressure from Last 3 Encounters:   02/15/18 111/73   11/07/17 121/81   10/06/17 116/77    Weight from Last 3 Encounters:   02/15/18 83.2 kg (183 lb 6.4 oz)   11/07/17 84.3 kg (185 lb 14.4 oz)   10/06/17 84.8 kg (187 lb)              We Performed the Following     Influenza A/B and RSV PCR - Nasal Swab, Clinic Collect          Today's Medication Changes          These changes are accurate as of 2/15/18  4:09 PM.  If you have any questions, ask your nurse or doctor.               Start taking these medicines.        Dose/Directions    ciprofloxacin 0.3 % ophthalmic solution   Commonly known as:  CILOXAN   Used for:  Bacterial conjunctivitis of both eyes   Started by:  Stevo Ahuja  MD Ryan        Dose:  2 drop   Place 2 drops into both eyes 4 times daily for 5 days   Quantity:  1 Bottle   Refills:  0       fluticasone 50 MCG/ACT spray   Commonly known as:  FLONASE   Used for:  Bronchitis with bronchospasm   Started by:  Stevo Ahuja MD        Dose:  1-2 spray   Spray 1-2 sprays into both nostrils daily   Quantity:  3 Bottle   Refills:  11         These medicines have changed or have updated prescriptions.        Dose/Directions    * montelukast 10 MG tablet   Commonly known as:  SINGULAIR   This may have changed:  Another medication with the same name was added. Make sure you understand how and when to take each.   Used for:  Cough   Changed by:  Stevo Ahuja MD        Dose:  10 mg   Take 1 tablet (10 mg) by mouth At Bedtime   Quantity:  30 tablet   Refills:  3       * montelukast 10 MG tablet   Commonly known as:  SINGULAIR   This may have changed:  You were already taking a medication with the same name, and this prescription was added. Make sure you understand how and when to take each.   Used for:  Bronchitis with bronchospasm   Changed by:  Stevo Ahuja MD        Dose:  10 mg   Take 1 tablet (10 mg) by mouth At Bedtime   Quantity:  30 tablet   Refills:  1       * Notice:  This list has 2 medication(s) that are the same as other medications prescribed for you. Read the directions carefully, and ask your doctor or other care provider to review them with you.         Where to get your medicines      These medications were sent to [address] [address]    Hours:  TRANSPLANT PHONE NUMBER [phone] Phone:  [phone]     ciprofloxacin 0.3 % ophthalmic solution    fluticasone 50 MCG/ACT spray    montelukast 10 MG tablet                Primary Care Provider Office Phone # Fax #    Aston Perry [phone]        [address] [facility] [address]  [facility] [address]        Equal Access to Services     CARLOS MENDOZA : Hadii aad ku hadkelleykenny Rebekaaristeo, wayesyjosie morochogeneha, qazahiramessi brownjannetjosie carreon, nish tatumleonidsteven salamanca. So [facility] [phone].    ATENCIÓN: Si habla español, tiene a sanchez disposición servicios gratuitos de asistencia lingüística. Llame al [phone].    We comply with applicable federal civil rights laws and [place] laws. We do not discriminate on the basis of race, color, national origin, age, disability, sex, sexual orientation, or gender identity.            Thank you!     Thank you for choosing [facility]  for your care. Our goal is always to provide you with excellent care. Hearing back from our patients is one way we can continue to improve our services. Please take a few minutes to complete the written survey that you may receive in the mail after your visit with us. Thank you!             Your Updated Medication List - Protect others around you: Learn how to safely use, store and throw away your medicines at www.disposemymeds.org.          This list is accurate as of 2/15/18  4:09 PM.  Always use your most recent med list.                   Brand Name Dispense Instructions for use Diagnosis    albuterol 108 (90 BASE) MCG/ACT Inhaler    PROAIR HFA/PROVENTIL HFA/VENTOLIN HFA    6.7 g    Inhale 2 puffs into the lungs every 4 hours as needed for shortness of breath / dyspnea or wheezing    Acute bronchospasm       amLODIPine 5 MG tablet    NORVASC    90 tablet    Take 1 tablet (5 mg) by mouth daily    Acute chest pain       aspirin 81 MG tablet      Take by mouth daily        atorvastatin 20 MG tablet    LIPITOR    90 tablet    Take 1 tablet (20 mg) by mouth daily You are rox to see your Cardiologist call [phone] to schedule.    NSTEMI (non-ST elevated myocardial infarction) (H)       BETA BLOCKER NOT PRESCRIBED (INTENTIONAL)      Beta Blocker not prescribed intentionally  due to Bradycardia < 50 bpm without beta blocker therapy    NSTEMI (non-ST elevated myocardial infarction) (H)       calcium citrate-vitamin D 315-200 MG-UNIT Tabs per tablet    CITRACAL     Take 1 tablet by mouth daily.        ciprofloxacin 0.3 % ophthalmic solution    CILOXAN    1 Bottle    Place 2 drops into both eyes 4 times daily for 5 days    Bacterial conjunctivitis of both eyes       clopidogrel 75 MG tablet    PLAVIX    30 tablet    Take 1 tablet (75 mg) by mouth daily Please make a follow visit for further refills.    NSTEMI (non-ST elevated myocardial infarction) (H)       entecavir 0.5 MG tablet    BARACLUDE    30 tablet    TAKE 1 TABLET BY MOUTH EVERY DAY    Hepatitis B virus infection       FLUoxetine 40 MG capsule    PROzac    90 capsule    Take 1 capsule (40 mg) by mouth every morning    Major depressive disorder, recurrent episode, moderate (H)       fluticasone 50 MCG/ACT spray    FLONASE    3 Bottle    Spray 1-2 sprays into both nostrils daily    Bronchitis with bronchospasm       ibuprofen 200 MG tablet    ADVIL/MOTRIN     Take 200 mg by mouth every 4 hours as needed for mild pain    Bronchitis, Essential hypertension, Coronary artery disease involving native heart without angina pectoris, unspecified vessel or lesion type       isosorbide mononitrate 30 MG 24 hr tablet    IMDUR    45 tablet    Take 0.5 tablets (15 mg) by mouth daily Please make a follow up appointment for further refills.    Acute chest pain       latanoprost 0.005 % ophthalmic solution    XALATAN    3 Bottle    Place 1 drop into both eyes At Bedtime    Borderline glaucoma with ocular hypertension, bilateral       losartan 50 MG tablet    COZAAR    90 tablet    Take 0.5 tablets (25 mg) by mouth daily    Hypertension, unspecified type       * montelukast 10 MG tablet    SINGULAIR    30 tablet    Take 1 tablet (10 mg) by mouth At Bedtime    Cough       * montelukast 10 MG tablet    SINGULAIR    30 tablet    Take 1 tablet (10 mg)  by mouth At Bedtime    Bronchitis with bronchospasm       MULTIVITAMIN/IRON PO      Take 1 tablet by mouth daily        mycophenolate 250 MG capsule    GENERIC EQUIVALENT    180 capsule    Take 3 capsules (750 mg) by mouth 2 times daily    Kidney transplanted       OMEGA 3 PO      Take 1 capsule by mouth daily        omeprazole 20 MG tablet    priLOSEC OTC    30 tablet    Take  by mouth daily.    Chronic hepatitis B (H), HTN (hypertension), Depression, Unspecified essential hypertension       * predniSONE 5 MG tablet    DELTASONE    90 tablet    Take 1 tablet (5 mg) by mouth daily    Kidney transplanted       * predniSONE 20 MG tablet    DELTASONE    5 tablet    Take 1 tablet (20 mg) by mouth daily    Acute bronchospasm       TYLENOL 325 MG tablet   Generic drug:  acetaminophen      Take 1-2 tablets by mouth every 6 hours as needed.        * Notice:  This list has 4 medication(s) that are the same as other medications prescribed for you. Read the directions carefully, and ask your doctor or other care provider to review them with you.

## 2018-02-17 NOTE — PROGRESS NOTES
PRIMARY CARE CENTER       SUBJECTIVE:  Jerad Ross is a 58 year old male with a PMHx of renal transplant, chronic hepatitis B, GERD and HTN who comes in for persistent and recurrent cough.  He has been seen several times over the past 6 months for a productive cough.  He presents today for similar symtoms.  Since last visits, he states he did have resolution of his presenting symptoms. Most recent episode symptoms started approximately 1 week ago  He noted what he calls bronchitis with chest tightness and has been taking albuterol inhaler several times daily with good effect.  He has had a non-productive cough and sore throat in the interval time as well, with cough noted particularly at night.  He also noted subjective fevers, for which he took tylenol for.  He felt well the day prior to evaluation and went to work, but woke on day of evaluation not feeling well.  He denies myalgias, CP, night sweats.      He also notes redness of his eyes with matering and purulent exudate.  No vision loss, no pain with eye movement.  Wife was in hospital for a few days for pancreatitis and he stayed with her overnight for several nights.  He is feeling more worn down than usual the past few days.      He denies other complaints at this time.      Medications and allergies reviewed by me today.     ROS:   Complete ROS reviewed and negative except as in HPI.     OBJECTIVE:    /73  Pulse 75  Temp 98.1  F (36.7  C) (Oral)  Resp 20  Wt 83.2 kg (183 lb 6.4 oz)  SpO2 95%  BMI 27.89 kg/m2   Wt Readings from Last 1 Encounters:   02/15/18 83.2 kg (183 lb 6.4 oz)       GENERAL APPEARANCE: healthy, alert and no distress     EYES: EOMI,  PERRL, bilateral conjunctival injection with purulence, no paranasal tenderness     HENT: ear canals normal and TM's with bilateral effusions without erythema and nose and mouth without ulcers or lesions, MMM, OP clear      NECK: no adenopathy, no asymmetry, masses      RESP: lungs clear to auscultation - no rales, rhonchi or wheezes, normal WOB on RA     CV: regular rates and rhythm, normal S1 S2, no S3 or S4 and no murmur, click or rub     ABDOMEN:  soft, nontender, bowel sounds normal     MS: extremities normal- no gross deformities noted     SKIN: scattered SKs throughout exposed areas of skin, no rashes or wounds noted on exposed areas of skin     NEURO: normal gait, mentation intact and speech normal     PSYCH: mentation appears normal. and affect normal/bright     LYMPHATICS: No axillary, cervical, or supraclavicular nodes     ASSESSMENT/PLAN:    Jerad was seen today for cough, chest congestion and medication request.    Diagnoses and all orders for this visit:    Fever in other diseases.  Subjective fever in immune suppressed patient with recurrent cough and symptoms. WIll get CXR today and influenza to evaluate for smouldering infection.  CXR without evidence of consolidation or infection. Flu swab negative.  6mm nodule noted today, repeat CXR ordered for in 6 months per follow-up guidelines for imaging.   -     XR Chest 2 Views; Future  -     Influenza A/B and RSV PCR - Nasal Swab, Clinic Collect    Obstructive chronic bronchitis with exacerbation (H). REcurrent symptoms over the course of several months.  Previously prescribed albuterol inhaler by [facility], but no prior PFTs despite reported history of asthma.  Will get PFTs as an outpatient.    -     General PFT Lab (Please always keep checked); Future  -     Pulmonary Function Test; Future    Bronchitis with bronchospasm.  Suspect secondary to post nasal drip, given non-productive cough.  Will trial singulair and flonase for symptom control, particularly given bilateral TM effusions.  No overt signs/symptoms of infection on physical and lab eval.    -     montelukast (SINGULAIR) 10 MG tablet; Take 1 tablet (10 mg) by mouth At Bedtime  -     fluticasone (FLONASE) 50 MCG/ACT spray; Spray 1-2 sprays into both  nostrils daily    Bacterial conjunctivitis of both eyes.  Bilateral conjunctival injection with purulence.  No pain or vision loss.  Trial of cipro eye drops x5 days.    -     ciprofloxacin (CILOXAN) 0.3 % ophthalmic solution; Place 2 drops into both eyes 4 times daily for 5 days    Pulmonary nodules.  6mm nodule noted on CXR today.  Repeat CXR ordered for 6 months from now for recommended follow-up.  Due 8/10/18.   -     XR Chest 2 Views; Future    Pt should return to clinic for f/u with me as needed if symptoms do not improve.      Stevo Ahuja MD  Feb 15, 2018    Pt was seen and plan of care discussed with Dr. Perry.   Pt was seen and examined with Dr. Ahuja.  I agree with his documentation as noted above.    My additional comments: None    Aston Perry

## 2018-03-01 DIAGNOSIS — R07.9 ACUTE CHEST PAIN: ICD-10-CM

## 2018-03-01 RX ORDER — ISOSORBIDE MONONITRATE 30 MG/1
15 TABLET, EXTENDED RELEASE ORAL DAILY
Qty: 45 TABLET | Refills: 0 | OUTPATIENT
Start: 2018-03-01

## 2018-03-06 ENCOUNTER — TELEPHONE (OUTPATIENT)
Dept: GASTROENTEROLOGY | Facility: CLINIC | Age: 58
End: 2018-03-06

## 2018-03-06 NOTE — TELEPHONE ENCOUNTER
Spoke to pt and questioned if he would be willing to see Jose D DONNELLY next week instead of Dr. Claros due as pt is medically stable. Pt stated he couldn't see Jose D this Monday but will discuss with Dr. Claros future appts with Jose D.

## 2018-03-12 NOTE — PROGRESS NOTES
"A/P  58 year old yo male with HBV. Has been on entecavir for a few years.  Last HBV DNA was 25 in September. Today's pending.  Last liver tests were normal.  Last Us for HCC screening was.  Continue daily entecavir and every 6 months US and labs. RTC 1 year.    Reviewed importance of compliance with daily entecavir, checking labs and cancer screening.    This was a 25 minute visit, over 50% counseling and coordination of care.  RTC in 1 year. Patient prefers to see me vs PA.    Lina Claros MD  Hepatology/Liver Transplant  Medical Director, Liver Transplantation  [facility]  ==================================================================      S:  58 year old you male with hepatitis B. On entecavir for a few years. Was not reliably taking in in the past, but has been compliant for a few years now.. 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 Fibrosis scan in September showed stage 1-2.  Hep B e Ag neg  Hep b e Love pos  HBV DNA 25 in September.  Liver tests normal.   Last HCC screening  HIV neg 1993  HCV neg 2008  US for HCC screening 11/7/17    Doing well. No problems getting medication on time and taking it every day. No new health problems. No new family history.    Soc: No alcohol, no smoking.    O:  /71 (BP Location: Right arm, Patient Position: Sitting, Cuff Size: Adult Large)  Pulse 58  Temp 97.6  F (36.4  C) (Oral)  Ht 1.727 m (5' 8\")  Wt 80.6 kg (177 lb 11.2 oz)  SpO2 95%  BMI 27.02 kg/m2   Gen: No acute distress  Head: Normocephalic atraumatic  Eyes: Sclera anicteric  Neck: No cervical lymphadenopathy  Respiratory: Clear to auscultation bilaterally, no overt wheezing or rales  CV: RRR   Abdomen:  Soft, nontender, nondistended, normal bowel sounds  Extrem: No edema  Skin: No jaundice, spider angiomata or palmar erythema.    Neuro: Alert and oriented. No asterixis.    MSK: Grossly Intact  Psych: Normal speech, normal affect    Current Outpatient Prescriptions   Medication     " montelukast (SINGULAIR) 10 MG tablet     fluticasone (FLONASE) 50 MCG/ACT spray     FLUoxetine (PROZAC) 40 MG capsule     isosorbide mononitrate (IMDUR) 30 MG 24 hr tablet     losartan (COZAAR) 50 MG tablet     clopidogrel (PLAVIX) 75 MG tablet     montelukast (SINGULAIR) 10 MG tablet     latanoprost (XALATAN) 0.005 % ophthalmic solution     entecavir (BARACLUDE) 0.5 MG tablet     albuterol (PROAIR HFA/PROVENTIL HFA/VENTOLIN HFA) 108 (90 BASE) MCG/ACT Inhaler     predniSONE (DELTASONE) 20 MG tablet     amLODIPine (NORVASC) 5 MG tablet     mycophenolate (CELLCEPT - GENERIC EQUIVALENT) 250 MG capsule     predniSONE (DELTASONE) 5 MG tablet     ibuprofen (ADVIL,MOTRIN) 200 MG tablet     atorvastatin (LIPITOR) 20 MG tablet     BETA BLOCKER NOT PRESCRIBED, INTENTIONAL,     aspirin 81 MG tablet     Omega-3 Fatty Acids (OMEGA 3 PO)     omeprazole (PRILOSEC OTC) 20 MG tablet     calcium citrate-vitamin D (CITRACAL) 315-200 MG-UNIT TABS     Multiple Vitamins-Iron (MULTIVITAMIN/IRON PO)     acetaminophen (TYLENOL) 325 MG tablet     No current facility-administered medications for this visit.

## 2018-03-13 ENCOUNTER — OFFICE VISIT (OUTPATIENT)
Dept: GASTROENTEROLOGY | Facility: CLINIC | Age: 58
End: 2018-03-13
Attending: INTERNAL MEDICINE
Payer: MEDICARE

## 2018-03-13 VITALS
HEIGHT: 68 IN | DIASTOLIC BLOOD PRESSURE: 71 MMHG | WEIGHT: 177.7 LBS | HEART RATE: 58 BPM | OXYGEN SATURATION: 95 % | TEMPERATURE: 97.6 F | BODY MASS INDEX: 26.93 KG/M2 | SYSTOLIC BLOOD PRESSURE: 111 MMHG

## 2018-03-13 DIAGNOSIS — B18.1 CHRONIC HEPATITIS B (H): Primary | ICD-10-CM

## 2018-03-13 DIAGNOSIS — J44.1 OBSTRUCTIVE CHRONIC BRONCHITIS WITH EXACERBATION (H): ICD-10-CM

## 2018-03-13 DIAGNOSIS — B18.1 CHRONIC VIRAL HEPATITIS B WITHOUT DELTA AGENT AND WITHOUT COMA (H): ICD-10-CM

## 2018-03-13 DIAGNOSIS — B18.1 CHRONIC HEPATITIS B (H): ICD-10-CM

## 2018-03-13 LAB
ALBUMIN SERPL-MCNC: 3.2 G/DL (ref 3.4–5)
ALP SERPL-CCNC: 77 U/L (ref 40–150)
ALT SERPL W P-5'-P-CCNC: 27 U/L (ref 0–70)
ANION GAP SERPL CALCULATED.3IONS-SCNC: 6 MMOL/L (ref 3–14)
AST SERPL W P-5'-P-CCNC: 24 U/L (ref 0–45)
BILIRUB DIRECT SERPL-MCNC: 0.1 MG/DL (ref 0–0.2)
BILIRUB SERPL-MCNC: 0.5 MG/DL (ref 0.2–1.3)
BUN SERPL-MCNC: 10 MG/DL (ref 7–30)
CALCIUM SERPL-MCNC: 9.1 MG/DL (ref 8.5–10.1)
CHLORIDE SERPL-SCNC: 107 MMOL/L (ref 94–109)
CO2 SERPL-SCNC: 27 MMOL/L (ref 20–32)
CREAT SERPL-MCNC: 0.78 MG/DL (ref 0.66–1.25)
ERYTHROCYTE [DISTWIDTH] IN BLOOD BY AUTOMATED COUNT: 16.5 % (ref 10–15)
GFR SERPL CREATININE-BSD FRML MDRD: >90 ML/MIN/1.7M2
GLUCOSE SERPL-MCNC: 87 MG/DL (ref 70–99)
HCT VFR BLD AUTO: 41.8 % (ref 40–53)
HGB BLD-MCNC: 12.8 G/DL (ref 13.3–17.7)
INR PPP: 0.95 (ref 0.86–1.14)
MCH RBC QN AUTO: 28.1 PG (ref 26.5–33)
MCHC RBC AUTO-ENTMCNC: 30.6 G/DL (ref 31.5–36.5)
MCV RBC AUTO: 92 FL (ref 78–100)
PLATELET # BLD AUTO: 343 10E9/L (ref 150–450)
POTASSIUM SERPL-SCNC: 4 MMOL/L (ref 3.4–5.3)
PROT SERPL-MCNC: 6.8 G/DL (ref 6.8–8.8)
RBC # BLD AUTO: 4.56 10E12/L (ref 4.4–5.9)
SODIUM SERPL-SCNC: 140 MMOL/L (ref 133–144)
WBC # BLD AUTO: 8.5 10E9/L (ref 4–11)

## 2018-03-13 PROCEDURE — 80048 BASIC METABOLIC PNL TOTAL CA: CPT | Performed by: INTERNAL MEDICINE

## 2018-03-13 PROCEDURE — 87517 HEPATITIS B DNA QUANT: CPT | Performed by: INTERNAL MEDICINE

## 2018-03-13 PROCEDURE — 36415 COLL VENOUS BLD VENIPUNCTURE: CPT | Performed by: INTERNAL MEDICINE

## 2018-03-13 PROCEDURE — 85610 PROTHROMBIN TIME: CPT | Performed by: INTERNAL MEDICINE

## 2018-03-13 PROCEDURE — 85027 COMPLETE CBC AUTOMATED: CPT | Performed by: INTERNAL MEDICINE

## 2018-03-13 PROCEDURE — G0463 HOSPITAL OUTPT CLINIC VISIT: HCPCS | Mod: ZF

## 2018-03-13 PROCEDURE — 80076 HEPATIC FUNCTION PANEL: CPT | Performed by: INTERNAL MEDICINE

## 2018-03-13 RX ORDER — ENTECAVIR 0.5 MG/1
0.5 TABLET, FILM COATED ORAL DAILY
Qty: 90 TABLET | Refills: 3 | Status: SHIPPED | OUTPATIENT
Start: 2018-03-13 | End: 2019-04-03

## 2018-03-13 ASSESSMENT — PAIN SCALES - GENERAL: PAINLEVEL: NO PAIN (0)

## 2018-03-13 NOTE — LETTER
"3/13/2018      RE: Jerad Ross  [address]   [facility] [address]       A/P  58 year old yo male with HBV. Has been on entecavir for a few years.  Last HBV DNA was 25 in September. Today's pending.  Last liver tests were normal.  Last Us for HCC screening was.  Continue daily entecavir and every 6 months US and labs. RTC 1 year.    Reviewed importance of compliance with daily entecavir, checking labs and cancer screening.    This was a 25 minute visit, over 50% counseling and coordination of care.  RTC in 1 year. Patient prefers to see me vs PA.    Lina Claros MD  Hepatology/Liver Transplant  Medical Director, Liver Transplantation  [facility]  ==================================================================      S:  58 year old you male with hepatitis B. On entecavir for a few years. Was not reliably taking in in the past, but has been compliant for a few years now.. 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 Fibrosis scan in September showed stage 1-2.  Hep B e Ag neg  Hep b e Love pos  HBV DNA 25 in September.  Liver tests normal.   Last HCC screening  HIV neg 1993  HCV neg 2008  US for HCC screening 11/7/17    Doing well. No problems getting medication on time and taking it every day. No new health problems. No new family history.    Soc: No alcohol, no smoking.    O:  /71 (BP Location: Right arm, Patient Position: Sitting, Cuff Size: Adult Large)  Pulse 58  Temp 97.6  F (36.4  C) (Oral)  Ht 1.727 m (5' 8\")  Wt 80.6 kg (177 lb 11.2 oz)  SpO2 95%  BMI 27.02 kg/m2   Gen: No acute distress  Head: Normocephalic atraumatic  Eyes: Sclera anicteric  Neck: No cervical lymphadenopathy  Respiratory: Clear to auscultation bilaterally, no overt wheezing or rales  CV: RRR   Abdomen:  Soft, nontender, nondistended, normal bowel sounds  Extrem: No edema  Skin: No jaundice, spider angiomata or palmar erythema.    Neuro: Alert and oriented. No asterixis.    MSK: Grossly " Intact  Psych: Normal speech, normal affect    Current Outpatient Prescriptions   Medication     montelukast (SINGULAIR) 10 MG tablet     fluticasone (FLONASE) 50 MCG/ACT spray     FLUoxetine (PROZAC) 40 MG capsule     isosorbide mononitrate (IMDUR) 30 MG 24 hr tablet     losartan (COZAAR) 50 MG tablet     clopidogrel (PLAVIX) 75 MG tablet     montelukast (SINGULAIR) 10 MG tablet     latanoprost (XALATAN) 0.005 % ophthalmic solution     entecavir (BARACLUDE) 0.5 MG tablet     albuterol (PROAIR HFA/PROVENTIL HFA/VENTOLIN HFA) 108 (90 BASE) MCG/ACT Inhaler     predniSONE (DELTASONE) 20 MG tablet     amLODIPine (NORVASC) 5 MG tablet     mycophenolate (CELLCEPT - GENERIC EQUIVALENT) 250 MG capsule     predniSONE (DELTASONE) 5 MG tablet     ibuprofen (ADVIL,MOTRIN) 200 MG tablet     atorvastatin (LIPITOR) 20 MG tablet     BETA BLOCKER NOT PRESCRIBED, INTENTIONAL,     aspirin 81 MG tablet     Omega-3 Fatty Acids (OMEGA 3 PO)     omeprazole (PRILOSEC OTC) 20 MG tablet     calcium citrate-vitamin D (CITRACAL) 315-200 MG-UNIT TABS     Multiple Vitamins-Iron (MULTIVITAMIN/IRON PO)     acetaminophen (TYLENOL) 325 MG tablet     No current facility-administered medications for this visit.        Lina Claros MD

## 2018-03-13 NOTE — MR AVS SNAPSHOT
After Visit Summary   3/13/2018    Jerad Ross    MRN: [id number]           Patient Information     Date Of Birth          1960        Visit Information        Provider Department      3/13/2018 10:30 AM Lina Claros MD [facility] Hepatology        Today's Diagnoses     Chronic hepatitis B (H)    -  1    Chronic viral hepatitis B without delta agent and without coma (H)           Follow-ups after your visit        Follow-up notes from your care team     Return in about 1 year (around 3/13/2019).      Your next 10 appointments already scheduled     Apr 13, 2018 12:30 PM CDT   Adult Med Follow UP with RONALDO Dempsey CNP   Psychiatry Clinic ([facility])    [address]  [address] [address] [phone]            May 07, 2018  8:30 AM CDT   RETURN GLAUCOMA with Viki Rizzo MD   Eye Clinic ([facility])    [address]  [facility] [address] [phone]            Jun 12, 2018  1:30 PM CDT   (Arrive by 1:00 PM)   Return Kidney Transplant with Nolan Lovett MD   [facility] Nephrology ([facility])    [address] [facility]  [address]  [facility] [id number] [phone]              Future tests that were ordered for you today     Open Future Orders        Priority Expected Expires Ordered    US Abdomen Limited* Routine 5/13/2018 3/13/2019 3/13/2018    Basic metabolic panel Routine 9/13/2018 12/13/2018 3/13/2018    Hepatic panel Routine 9/13/2018 12/13/2018 3/13/2018    CBC with platelets Routine 9/13/2018 12/13/2018 3/13/2018    Hep B Virus DNA Quant Real Time PCR Routine 9/13/2018 12/13/2018 3/13/2018            Who to contact     If you have questions or need follow up information about today's clinic visit or your schedule please contact [facility] HEPATOLOGY directly at [phone].  Normal or non-critical  "lab and imaging results will be communicated to you by Mobile [employer], letter or phone within 4 business days after the clinic has received the results. If you do not hear from us within 7 days, please contact the clinic through Ever[employer] or phone. If you have a critical or abnormal lab result, we will notify you by phone as soon as possible.  Submit refill requests through Everpay or call your pharmacy and they will forward the refill request to us. Please allow 3 business days for your refill to be completed.          Additional Information About Your Visit        [employer] Information     Ever[employer] gives you secure access to your electronic health record. If you see a primary care provider, you can also send messages to your care team and make appointments. If you have questions, please call your primary care clinic.  If you do not have a primary care provider, please call [phone] and they will assist you.        Care EveryWhere ID     This is your Care EveryWhere ID. This could be used by other organizations to access your [place] medical records  [id number]        Your Vitals Were     Pulse Temperature Height Pulse Oximetry BMI (Body Mass Index)       58 97.6  F (36.4  C) (Oral) 1.727 m (5' 8\") 95% 27.02 kg/m2        Blood Pressure from Last 3 Encounters:   03/13/18 111/71   02/15/18 111/73   11/07/17 121/81    Weight from Last 3 Encounters:   03/13/18 80.6 kg (177 lb 11.2 oz)   02/15/18 83.2 kg (183 lb 6.4 oz)   11/07/17 84.3 kg (185 lb 14.4 oz)                 Today's Medication Changes          These changes are accurate as of 3/13/18  3:28 PM.  If you have any questions, ask your nurse or doctor.               These medicines have changed or have updated prescriptions.        Dose/Directions    entecavir 0.5 MG tablet   Commonly known as:  BARACLUDE   This may have changed:  See the new instructions.   Used for:  Chronic viral hepatitis B without delta agent and without coma (H), Chronic hepatitis B (H) "   Changed by:  Lina Claros MD        Dose:  0.5 mg   Take 1 tablet (0.5 mg) by mouth daily   Quantity:  90 tablet   Refills:  3            Where to get your medicines      These medications were sent to [facility], A [facility] - [place], [place] - [address]  [address], [facility] [address]     Phone:  [phone]     entecavir 0.5 MG tablet                Primary Care Provider Office Phone # Fax #    Aston Reji Perry [phone]       [address]  [facility] [address]        Equal Access to Services     CARLOS MENDOZA : Hadii yuli ferrara hadasho Sokellyali, waaxda luqadaha, qaybta kaalmada adeegyada, nish gates . So [facility] [phone].    ATENCIÓN: Si habla español, tiene a sanchez disposición servicios gratuitos de asistencia lingüística. Llame al [phone].    We comply with applicable federal civil rights laws and [place] laws. We do not discriminate on the basis of race, color, national origin, age, disability, sex, sexual orientation, or gender identity.            Thank you!     Thank you for choosing [facility] HEPATOLOGY  for your care. Our goal is always to provide you with excellent care. Hearing back from our patients is one way we can continue to improve our services. Please take a few minutes to complete the written survey that you may receive in the mail after your visit with us. Thank you!             Your Updated Medication List - Protect others around you: Learn how to safely use, store and throw away your medicines at www.disposemymeds.org.          This list is accurate as of 3/13/18  3:28 PM.  Always use your most recent med list.                   Brand Name Dispense Instructions for use Diagnosis    albuterol 108 (90 BASE) MCG/ACT Inhaler    PROAIR HFA/PROVENTIL HFA/VENTOLIN HFA    6.7 g    Inhale 2 puffs into the lungs every 4 hours as needed for  shortness of breath / dyspnea or wheezing    Acute bronchospasm       amLODIPine 5 MG tablet    NORVASC    90 tablet    Take 1 tablet (5 mg) by mouth daily    Acute chest pain       aspirin 81 MG tablet      Take by mouth daily        atorvastatin 20 MG tablet    LIPITOR    90 tablet    Take 1 tablet (20 mg) by mouth daily You are rox to see your Cardiologist call [phone] to schedule.    NSTEMI (non-ST elevated myocardial infarction) (H)       BETA BLOCKER NOT PRESCRIBED (INTENTIONAL)      Beta Blocker not prescribed intentionally due to Bradycardia < 50 bpm without beta blocker therapy    NSTEMI (non-ST elevated myocardial infarction) (H)       calcium citrate-vitamin D 315-200 MG-UNIT Tabs per tablet    CITRACAL     Take 1 tablet by mouth daily.        clopidogrel 75 MG tablet    PLAVIX    30 tablet    Take 1 tablet (75 mg) by mouth daily Please make a follow visit for further refills.    NSTEMI (non-ST elevated myocardial infarction) (H)       entecavir 0.5 MG tablet    BARACLUDE    90 tablet    Take 1 tablet (0.5 mg) by mouth daily    Chronic viral hepatitis B without delta agent and without coma (H), Chronic hepatitis B (H)       FLUoxetine 40 MG capsule    PROzac    90 capsule    Take 1 capsule (40 mg) by mouth every morning    Major depressive disorder, recurrent episode, moderate (H)       fluticasone 50 MCG/ACT spray    FLONASE    3 Bottle    Spray 1-2 sprays into both nostrils daily    Bronchitis with bronchospasm       ibuprofen 200 MG tablet    ADVIL/MOTRIN     Take 200 mg by mouth every 4 hours as needed for mild pain    Bronchitis, Essential hypertension, Coronary artery disease involving native heart without angina pectoris, unspecified vessel or lesion type       isosorbide mononitrate 30 MG 24 hr tablet    IMDUR    45 tablet    Take 0.5 tablets (15 mg) by mouth daily Please make a follow up appointment for further refills.    Acute chest pain       latanoprost 0.005 % ophthalmic solution     XALATAN    3 Bottle    Place 1 drop into both eyes At Bedtime    Borderline glaucoma with ocular hypertension, bilateral       losartan 50 MG tablet    COZAAR    90 tablet    Take 0.5 tablets (25 mg) by mouth daily    Hypertension, unspecified type       * montelukast 10 MG tablet    SINGULAIR    30 tablet    Take 1 tablet (10 mg) by mouth At Bedtime    Cough       * montelukast 10 MG tablet    SINGULAIR    30 tablet    Take 1 tablet (10 mg) by mouth At Bedtime    Bronchitis with bronchospasm       MULTIVITAMIN/IRON PO      Take 1 tablet by mouth daily        mycophenolate 250 MG capsule    GENERIC EQUIVALENT    180 capsule    Take 3 capsules (750 mg) by mouth 2 times daily    Kidney transplanted       OMEGA 3 PO      Take 1 capsule by mouth daily        omeprazole 20 MG tablet    priLOSEC OTC    30 tablet    Take  by mouth daily.    Chronic hepatitis B (H), HTN (hypertension), Depression, Unspecified essential hypertension       * predniSONE 5 MG tablet    DELTASONE    90 tablet    Take 1 tablet (5 mg) by mouth daily    Kidney transplanted       * predniSONE 20 MG tablet    DELTASONE    5 tablet    Take 1 tablet (20 mg) by mouth daily    Acute bronchospasm       TYLENOL 325 MG tablet   Generic drug:  acetaminophen      Take 1-2 tablets by mouth every 6 hours as needed.        * Notice:  This list has 4 medication(s) that are the same as other medications prescribed for you. Read the directions carefully, and ask your doctor or other care provider to review them with you.

## 2018-03-13 NOTE — NURSING NOTE
"Chief Complaint   Patient presents with     RECHECK     Chronic Hepatitis B       Initial /71 (BP Location: Right arm, Patient Position: Sitting, Cuff Size: Adult Large)  Pulse 58  Temp 97.6  F (36.4  C) (Oral)  Ht 1.727 m (5' 8\")  Wt 80.6 kg (177 lb 11.2 oz)  SpO2 95%  BMI 27.02 kg/m2 Estimated body mass index is 27.02 kg/(m^2) as calculated from the following:    Height as of this encounter: 1.727 m (5' 8\").    Weight as of this encounter: 80.6 kg (177 lb 11.2 oz).  Medication Reconciliation: complete     Jessica Boucher MA    "

## 2018-03-16 LAB
HBV DNA SERPL NAA+PROBE-ACNC: <20 [IU]/ML
HBV DNA SERPL NAA+PROBE-LOG IU: <1.3 {LOG_IU}/ML

## 2018-03-19 DIAGNOSIS — B18.1 CHRONIC HEPATITIS B (H): Primary | ICD-10-CM

## 2018-03-19 LAB
EXPTIME-PRE: 8.14 SEC
FEF2575-%PRED-POST: 96 %
FEF2575-%PRED-PRE: 76 %
FEF2575-POST: 2.95 L/SEC
FEF2575-PRE: 2.34 L/SEC
FEF2575-PRED: 3.06 L/SEC
FEFMAX-%PRED-PRE: 59 %
FEFMAX-PRE: 5.33 L/SEC
FEFMAX-PRED: 9.04 L/SEC
FEV1-%PRED-PRE: 66 %
FEV1-PRE: 2.32 L
FEV1FEV6-PRE: 82 %
FEV1FEV6-PRED: 80 %
FEV1FVC-PRE: 83 %
FEV1FVC-PRED: 76 %
FIFMAX-PRE: 4.77 L/SEC
FVC-%PRED-PRE: 62 %
FVC-PRE: 2.81 L
FVC-PRED: 4.46 L

## 2018-03-20 DIAGNOSIS — Z94.0 KIDNEY TRANSPLANTED: Primary | ICD-10-CM

## 2018-03-20 DIAGNOSIS — R07.9 ACUTE CHEST PAIN: ICD-10-CM

## 2018-03-20 RX ORDER — PREDNISONE 5 MG/1
5 TABLET ORAL DAILY
Qty: 90 TABLET | Refills: 3 | Status: SHIPPED | OUTPATIENT
Start: 2018-03-20 | End: 2019-04-05

## 2018-03-21 RX ORDER — ISOSORBIDE MONONITRATE 30 MG/1
15 TABLET, EXTENDED RELEASE ORAL DAILY
Qty: 45 TABLET | Refills: 0 | OUTPATIENT
Start: 2018-03-21

## 2018-03-23 ENCOUNTER — CARE COORDINATION (OUTPATIENT)
Dept: CARDIOLOGY | Facility: CLINIC | Age: 58
End: 2018-03-23

## 2018-03-23 NOTE — PROGRESS NOTES
Received pool message from call center stating Pt was requesting a refill on his Imdur d/t him being out.  The last time he was seen in clinic was 01/21/2016 by Dr Atwood.  Because Dr Atwood is no longer practicing here and it has been over two years, Pt was informed that he should reach out to his PCP for a refill on his Imdur and that he needs to reestablish care with a new general cardiologist.  Pt would like to be seen 04/10/2018 at 130pm by Dr Orantes.  Hold placed and message sent to scheduling to confirm the appointment.  Pt stated he would contact his PCP in the meantime to receive a refill as directed.     Myrna Montalvo LPN

## 2018-03-28 DIAGNOSIS — R07.9 ACUTE CHEST PAIN: ICD-10-CM

## 2018-03-28 RX ORDER — ISOSORBIDE MONONITRATE 30 MG/1
15 TABLET, EXTENDED RELEASE ORAL DAILY
Qty: 15 TABLET | Refills: 0 | Status: SHIPPED | OUTPATIENT
Start: 2018-03-28 | End: 2018-05-31

## 2018-03-28 NOTE — PROGRESS NOTES
Forwarded message for refill of Imdur.  Prior prescriber has left.  Should be scheduled to see new MD for further refills.  BP stable on review of prior appointment vitals.  No additional refills until he is seen in clinic.

## 2018-04-10 ENCOUNTER — OFFICE VISIT (OUTPATIENT)
Dept: INTERNAL MEDICINE | Facility: CLINIC | Age: 58
End: 2018-04-10
Payer: MEDICARE

## 2018-04-10 VITALS
WEIGHT: 175.9 LBS | TEMPERATURE: 98 F | OXYGEN SATURATION: 96 % | DIASTOLIC BLOOD PRESSURE: 65 MMHG | BODY MASS INDEX: 26.75 KG/M2 | SYSTOLIC BLOOD PRESSURE: 97 MMHG | HEART RATE: 76 BPM

## 2018-04-10 DIAGNOSIS — R05.3 CHRONIC COUGH: ICD-10-CM

## 2018-04-10 DIAGNOSIS — M79.10 MYALGIA: ICD-10-CM

## 2018-04-10 DIAGNOSIS — R06.2 WHEEZING: ICD-10-CM

## 2018-04-10 DIAGNOSIS — R05.9 COUGH: Primary | ICD-10-CM

## 2018-04-10 PROCEDURE — 87631 RESP VIRUS 3-5 TARGETS: CPT | Performed by: INTERNAL MEDICINE

## 2018-04-10 RX ORDER — ALBUTEROL SULFATE 90 UG/1
2 AEROSOL, METERED RESPIRATORY (INHALATION) EVERY 6 HOURS PRN
Qty: 1 INHALER | Refills: 2 | Status: SHIPPED | OUTPATIENT
Start: 2018-04-10 | End: 2020-10-28

## 2018-04-10 RX ORDER — CODEINE PHOSPHATE AND GUAIFENESIN 10; 100 MG/5ML; MG/5ML
1-2 SOLUTION ORAL EVERY 4 HOURS PRN
Qty: 420 ML | Refills: 0 | Status: SHIPPED | OUTPATIENT
Start: 2018-04-10 | End: 2018-04-17

## 2018-04-10 NOTE — PROGRESS NOTES
"                     PRIMARY CARE CENTER       SUBJECTIVE:  Jerad Ross is a 58 year old male with a PMHx of renal transplant, chronic hepatitis B, GERD and HTN who comes in for persistent and recurrent cough.  He has been seen several times over the past 8 months for a productive cough.  Productive of clear sputum. No fevers, or chills, but sometimes feels \"warm,\" when he wakes up. Does not take temperature at home. Endorses some sore throat.  Does not smoke, only smoked 5 years between age [age]. Endorses some myalgias, actually in the shoulders, for which he took ibuprofen.     Last seen by Dr. Morris in March.  He had a chest x-ray done at that time which was notable only for of right lower lobe 6 mm nodule.  Chronic cough was attributed to postnasal drip and patient was prescribed Flonase and montelukast.    Last several days have been wheezing at night and coughing.  Regular cough medicine does not help. Albuterol ran out several weeks ago. Flonase helping. A couple years ago had Robitussen-Codeine, which helped.  Has been to tolerate PO intake, though appetite is somewhat diminished. Has been losing weight partly because wife is on a no carb diet. No hearing changes.     The only medications that patient is taking now is cough drops.  He ran out of albuterol a few weeks ago and has not been taking the montelukast.      No chest pain, sometimes labored breathing.  No eye drainage and no sinus pressure.  No abdominal pain or diarrhea.     Past Medical History:   Diagnosis Date     AION (acute ischemic optic neuropathy)      Anemia in chronic renal disease      Avascular necrosis of bones of both hips (H)     s/p bilateral hip replacements     Basal cell carcinoma      CAD (coronary artery disease) 6/17/2014     Chronic hepatitis B (H)      Clostridium difficile colitis      Coronary artery disease involving native coronary artery of native heart without angina pectoris 6/17/2014    Coronary angiogram " 6/17/14: Severe distal 3-vessel disease involving small vessels, not amenable to PCI or CABG.     CRP elevated      Depression      Diverticulosis      Dyslipidemia      FSGS (focal segmental glomerulosclerosis)      Gastric ulcer with hemorrhage 2/12/12     GERD (gastroesophageal reflux disease)      Glaucoma     OHTN     Hemorrhoids      Hypertension secondary to other renal disorders      Hypogonadism in male      Immunosuppressed status (H)      Kidney replaced by transplant     focal glomerulosclerosis      NSTEMI (non-ST elevated myocardial infarction) (H) 6/17/2014     JULIANE (obstructive sleep apnea)     Doesn't use CPAP     Paracentral scotoma     LE     Secondary renal hyperparathyroidism (H)      Squamous cell carcinoma        Medications and allergies reviewed by me today.     ROS:   Constitutional, neuro, ENT, endocrine, pulmonary, cardiac, gastrointestinal, genitourinary, musculoskeletal, integument and psychiatric systems are negative, except as otherwise noted.    OBJECTIVE:    BP 97/65  Pulse 76  Temp 98  F (36.7  C) (Oral)  Wt 79.8 kg (175 lb 14.4 oz)  SpO2 96%  BMI 26.75 kg/m2   Wt Readings from Last 1 Encounters:   04/10/18 79.8 kg (175 lb 14.4 oz)       GENERAL APPEARANCE: healthy, alert and no distress     EYES: EOMI,  PERRL     HENT: ear canals with subtle white effusions bilaterally, and TM's normal and nose and mouth without ulcers or lesions     NECK: no adenopathy, no asymmetry, masses, or scars and thyroid normal to palpation     RESP: lungs clear to auscultation - no rales, rhonchi or wheezes     CV: regular rates and rhythm, normal S1 S2, no S3 or S4 and no murmur, click or rub     ABDOMEN:  soft, nontender, no HSM or masses and bowel sounds normal     MS: extremities normal- no gross deformities noted, no evidence of inflammation in joints, FROM in all extremities.     SKIN: no suspicious lesions or rashes     NEURO: Normal strength and tone, sensory exam grossly normal, mentation  intact and speech normal     PSYCH: mentation appears normal. and affect normal/bright     LYMPHATICS: No cervical adenopathy    PFT's 3/13/2018:   The FEV1 and FVC are reduced, but the FEV1/FVC ratio is normal. Following the administration of bronchodilators, there is no significant response. The inspiratory flow rates are within normal limits.  IMPRESSION:  Possible restriction, however lung volume testing would be required to evalutate for a restrictive process, if clinically indicated.   No significant response to bronchodilator.      ASSESSMENT/PLAN:    Jerad was seen today for cough.    Diagnoses and all orders for this visit:    Chronic Cough:  On and off symptoms for approximately 8 months now.  PFTs were relatively nonconclusive but did have question of restrictive lung disease; given that lung volumes were not ordered, we will obtain repeat PFTs at this point with lung volumes.  Given the question of restrictive lung disease we will also obtain a CT chest to further characterize lung architecture.  In the meantime for symptom control I have prescribed guaifenesin codeine and albuterol inhaler.  -     Influenza A/B and RSV PCR - Nasal Swab, Clinic Collect  -     CT Chest w/o contrast; Future  -     guaiFENesin-codeine (ROBITUSSIN AC) 100-10 MG/5ML SOLN solution; Take 5-10 mLs by mouth every 4 hours as needed for cough  -     albuterol (PROAIR HFA) 108 (90 Base) MCG/ACT Inhaler; Inhale 2 puffs into the lungs every 6 hours as needed for shortness of breath / dyspnea or wheezing  -     Pulmonary Function Test; Future    Myalgia: His myalgias seem to be primarily localized in the shoulders, thus lower suspicion for influenza, but given immunosuppressed status will obtain influenza swab to rule out today.  -     Influenza A/B and RSV PCR - Nasal Swab, Clinic Collect    Wheezing  -     albuterol (PROAIR HFA) 108 (90 Base) MCG/ACT Inhaler; Inhale 2 puffs into the lungs every 6 hours as needed for shortness of  breath / dyspnea or wheezing    Pt should return to clinic for f/u with me or Dr. Perry after PFT's and Chest CT have been completed.     Reva Ponce MD  Apr 10, 2018    Pt plan of care discussed with Dr. Perry.   While the patient was in clinic, I reviewed the pertinent medical history and results.  I discussed the current findings on physical examination, as well as the patient s diagnosis and treatment plan with the resident and agree with the information as documented with the following exceptions: none.  Aston Perry

## 2018-04-10 NOTE — MR AVS SNAPSHOT
After Visit Summary   4/10/2018    Jerad Ross    MRN: [id number]           Patient Information     Date Of Birth          1960        Visit Information        Provider Department      4/10/2018 1:40 PM Reva Ponce MD [facility]        Today's Diagnoses     Cough    -  1    Myalgia        Chronic cough        Wheezing           Follow-ups after your visit        Your next 10 appointments already scheduled     Apr 13, 2018 12:30 PM CDT   Adult Med Follow UP with RONALDO Dempsey CNP   Psychiatry Clinic ([facility])    [address]  [address] [address]   [phone]            Apr 17, 2018  9:30 AM CDT   FULL PULMONARY FUNCTION with UC PFL A   [facility] Pulmonary Function Testing ([facility])    [address] [place] [id number]   [phone]            Apr 17, 2018 10:30 AM CDT   METHACHOLINE with UC PFL PENT   [facility] Pulmonary Function Testing ([facility])    [address] [place] [address]   [phone]           The Methacholine Challenge test evaluates how sensitive the airways in your lungs are.You will do a number of breathing tests called spirometry. Spirometry shows how much air you can breathe and how fast you can blow your air out. The technician will explain what you need to do during each test. A good effort during the breathing tests is important. The technician will  you during each test to help you give a good effort. Between the breathing tests you will be asked to inhale a spray (Methacholine). The spirometry results are compared before and after you inhale the spray to see what changes there are in your breathing. If you have questions during the tests, please ask the technician.  Preparation for a Methacholine Challenge Test 1) Allow about 1.5 hours for the test. 2) Do  not wear perfumes or colognes the day of the test. 3) Avoid exercise and exposure to cold air two hours before the test. 4) Avoid caffeine: No coffee, tea, cola, chocolate, or medicines containing caffeine the day of the test. 5) Avoid nicotine: No smoking or smokeless tobacco within 24 hrs of the test 6) Do not take ANY inhaled medications the day of the test, except for emergency use (please call the pulmonary lab if inhalers are used so the test can be rescheduled).  The following medications could interfere with the Methacholine challenge. Please withhold the following medication for the recommended periods of time or your test will need to be rescheduled: (2 days) BETA BLOCKERS: (medications for heart disease and high blood pressure): Acebutolol (Sectral), Atenolol (Tenormin), Betachron (Propraolol ER), Betaxolol (Kerlone), Bisoprolol (Zebeta), Cartrteolol (Cartrol), Carvedilol (Coreg), Corzide(Bendroflumethiazide and Nadol), Esmolol, Inderal, Inderal LA, InderalXL (Propraolol, Propranolol LA, Propranolol XL) Inderide (Propranolol and HCTZ), Labetolol (Normodyne, Trandate), Lopressor, Lopressor HCT (Metoprolol, Metoprolol HCTZ), Nadolol(Corgard), Nebivolol (Bystolic), Pindolol (Visken), Penbutolol (Levatol), Tenoretic (Atenolol and Chlorthalidone), Timolide (Timololand HCTZ) Timolol (Blocadren), Toprol, Toptol XL (Metoprolol, Metoprolol XL) Trandate (Labetalol, Normodyne), Ziac (Bisoprolol and HCTZ) *** If you are unsure if your medication is a Beta Blocker please call the clinic.  (1 week)  Anticholenergics: Tiotropium (SPIRIVA), Aclidinium Bromide (Pressair) Umeclidinium Bromide (Incruse) Umeclidinium Bromide/Vilanterol (Anora Ellipta), Ipatroprium Bromide (Atrovent), Combivent Respimate (Albuterol/Atrovent) (5 days) LONG-ACTING INHALED BRONCHODILATORS: or medications that contain them: Salmeterol (Serevent), Formoterol (Foradil), Advair, Symbicort, Dulera, Breo Ellipta (Fluticisone Furoate/Vilanterol),  "Indacaterol (Arcapta Neohaler).   (5 Days) LEUKOTRIENE MODIFIERS: Singular (Montelukast), Accolade (Zafirlukast), Zyflo (Zileutin). (3 days) Hydroxazine (Astrax, Vistaril), Cetirizine (Zyrtec), Theophyolline (Bronkodyl, Elixophyllin, Slo-bid, Slo-Phyllin, José Luis-24, José Luis-Dur, Theolair, Uniphyl (24 hours) ANTIHISTAMINES: and/or medications that contain them: Including allergy, cold, sinus and over the counter sleep aids (8 hours) SHORT-ACTING INHALED BRONCHODILATORS: \"RESCUE INHALERS\" Albuterol (Proventil, Ventolin, ProAir) Maxair, Primatine Mist, Metaproterenol (Alupent), Terbutaline (Brethine), Levealbuterol (Xopenex).  DO NOT stop taking any prescription medications without first talking to the prescribing physician. If you have questions or concerns about this test, please call the pulmonary function lab and talk with one of the Methacholine Techs at [phone].  Conditions that interfere with the test: Please call the pulmonary function lab to reschedule the Methacholine test if you have had recent: Respiratory infections (colds/flu) 2 - 6 weeks prior to test Medical treatment with a burst of prednisone (oral steroids) in the last 4 weeks Heart Attack or Stroke in the last 3 months.  Methacholine is a cholinergic agent that should not be administered to persons who are on cholinesterase inhibitors (for Myasthenia Gravis). Also epilepsy, cardiovascular disease with bradycardia (slow heart rate), vagotonia, peptic ulcer, untreated thyroid disease or urinary tract obstruction. If you have any of these conditions, call the Pulmonary Function Lab at [phone].  The effect of methacholine on pregnancy has not been tested. Methacholine testing is not done on pregnant or nursing women. If you are a female and have the potential of being pregnant, schedule the test within ten days of menses or within two weeks of a negative pregnancy test.  Possible Risk and Discomfort  The risks associated with a methacholine " challenge are: coughing, wheezing, shortness of breath, dizziness and/or light headedness. There is a very rare chance of severe respiratory distress. Our staff is trained for this event and you will be given medications (Albuterol) to reverse the effects of the methacholine.  DO NOT STOP TAKING ANY PRESCRIPTION MEDICATIONS WITHOUT FIRST TALKING TO YOUR PRESCRIBING PHYSICIAN            Apr 17, 2018 12:40 PM CDT   (Arrive by 12:25 PM)   CT CHEST W/O CONTRAST with UCCT2   [facility] [place] [facility] ([facility] [place])    [address]  1st Floor  [facility] [address] [phone]           Please bring any scans or X-rays taken at other hospitals, if similar tests were done. Also bring a list of your medicines, including vitamins, minerals and over-the-counter drugs. It is safest to leave personal items at home.  Be sure to tell your doctor:   If you have any allergies.   If there s any chance you are pregnant.   If you are breastfeeding.  You do not need to do anything special to prepare for this exam.  Please wear loose clothing, such as a sweat suit or jogging clothes. Avoid snaps, zippers and other metal. We may ask you to undress and put on a hospital gown.            May 01, 2018 10:20 AM CDT   (Arrive by 10:05 AM)   ACUTE/CHRONIC SINGLE CONDITION with Aston Perry MD   [facility] ([facility])    [address]  4th [address]  [facility] [address] [phone]            May 07, 2018  8:30 AM CDT   RETURN GLAUCOMA with Viki Rizzo MD   [facility] ([facility])    [address]  [facility] [address] [phone]            May 08, 2018 12:30 PM CDT   (Arrive by 12:15 PM)   New Patient Visit with Balta Orantes MD   [facility] ([facility])    [address]  [address]  [facility] [address]    [phone]            Jun 12, 2018  1:30 PM CDT   (Arrive by 1:00 PM)   Return Kidney Transplant with Nolan Lovett MD   [facility] [place] [facility] ([facility])    [address] [place] [address]  [address] [place] [address] [phone]              Future tests that were ordered for you today     Open Future Orders        Priority Expected Expires Ordered    CT Chest w/o contrast Routine  4/10/2019 4/10/2018    General PFT Lab (Please always keep checked) Routine  4/10/2019 4/10/2018    Pulmonary Function Test Routine  4/10/2019 4/10/2018            Who to contact     Please call your clinic at [phone] to:    Ask questions about your health    Make or cancel appointments    Discuss your medicines    Learn about your test results    Speak to your doctor            Additional Information About Your Visit        Alert [employer] Information     [employer] gives you secure access to your electronic health record. If you see a primary care provider, you can also send messages to your care team and make appointments. If you have questions, please call your primary care clinic.  If you do not have a primary care provider, please call [phone] and they will assist you.      [employer] is an electronic gateway that provides easy, online access to your medical records. With [employer], you can request a clinic appointment, read your test results, renew a prescription or communicate with your care team.     To access your existing account, please contact your [facility] or call [phone] for assistance.        Care EveryWhere ID     This is your Care EveryWhere ID. This could be used by other organizations to access your [place] medical records  [id number]        Your Vitals Were     Pulse Temperature Pulse Oximetry BMI (Body Mass Index)          76 98  F (36.7  C) (Oral) 96% 26.75 kg/m2         Blood Pressure from Last 3 Encounters:   04/10/18 97/65   03/13/18 111/71    02/15/18 111/73    Weight from Last 3 Encounters:   04/10/18 79.8 kg (175 lb 14.4 oz)   03/13/18 80.6 kg (177 lb 11.2 oz)   02/15/18 83.2 kg (183 lb 6.4 oz)              We Performed the Following     Influenza A/B and RSV PCR - Nasal Swab, Clinic Collect          Today's Medication Changes          These changes are accurate as of 4/10/18  2:47 PM.  If you have any questions, ask your nurse or doctor.               Start taking these medicines.        Dose/Directions    guaiFENesin-codeine 100-10 MG/5ML Soln solution   Commonly known as:  ROBITUSSIN AC   Used for:  Cough   Started by:  Reva Ponce MD        Dose:  1-2 tsp.   Take 5-10 mLs by mouth every 4 hours as needed for cough   Quantity:  420 mL   Refills:  0         These medicines have changed or have updated prescriptions.        Dose/Directions    * albuterol 108 (90 Base) MCG/ACT Inhaler   Commonly known as:  PROAIR HFA/PROVENTIL HFA/VENTOLIN HFA   This may have changed:  Another medication with the same name was added. Make sure you understand how and when to take each.   Used for:  Acute bronchospasm   Changed by:  Reva Ponce MD        Dose:  2 puff   Inhale 2 puffs into the lungs every 4 hours as needed for shortness of breath / dyspnea or wheezing   Quantity:  6.7 g   Refills:  1       * albuterol 108 (90 Base) MCG/ACT Inhaler   Commonly known as:  PROAIR HFA   This may have changed:  You were already taking a medication with the same name, and this prescription was added. Make sure you understand how and when to take each.   Used for:  Cough, Wheezing   Changed by:  Reva Ponce MD        Dose:  2 puff   Inhale 2 puffs into the lungs every 6 hours as needed for shortness of breath / dyspnea or wheezing   Quantity:  1 Inhaler   Refills:  2       * Notice:  This list has 2 medication(s) that are the same as other medications prescribed for you. Read the directions carefully, and ask your doctor or other care provider to  review them with you.         Where to get your medicines      These medications were sent to [facility] [place] [facility] - [place], [place] - [address]  [address], [facility] [address]    Hours:  TRANSPLANT PHONE NUMBER [phone] Phone:  [phone]     albuterol 108 (90 Base) MCG/ACT Inhaler         Some of these will need a paper prescription and others can be bought over the counter.  Ask your nurse if you have questions.     Bring a paper prescription for each of these medications     guaiFENesin-codeine 100-10 MG/5ML Soln solution                Primary Care Provider Office Phone # Fax #    Aston Perry [phone]       [address] [place] [address]  [facility] [address]        Equal Access to Services     CARLOS MENDOZA : Hadii aad ku hadasho Soaristeo, waaxda luqadaha, qaybta kaalmada adeegyada, nish salamanca. So [facility] [phone].    ATENCIÓN: Si habla español, tiene a sanchez disposición servicios gratuitos de asistencia lingüística. Llame al [phone].    We comply with applicable federal civil rights laws and [place] laws. We do not discriminate on the basis of race, color, national origin, age, disability, sex, sexual orientation, or gender identity.            Thank you!     Thank you for choosing [facility]  for your care. Our goal is always to provide you with excellent care. Hearing back from our patients is one way we can continue to improve our services. Please take a few minutes to complete the written survey that you may receive in the mail after your visit with us. Thank you!             Your Updated Medication List - Protect others around you: Learn how to safely use, store and throw away your medicines at www.disposemymeds.org.          This list is accurate as of 4/10/18  2:47 PM.  Always use your most recent med list.                   Brand Name Dispense Instructions for use  Diagnosis    * albuterol 108 (90 Base) MCG/ACT Inhaler    PROAIR HFA/PROVENTIL HFA/VENTOLIN HFA    6.7 g    Inhale 2 puffs into the lungs every 4 hours as needed for shortness of breath / dyspnea or wheezing    Acute bronchospasm       * albuterol 108 (90 Base) MCG/ACT Inhaler    PROAIR HFA    1 Inhaler    Inhale 2 puffs into the lungs every 6 hours as needed for shortness of breath / dyspnea or wheezing    Cough, Wheezing       amLODIPine 5 MG tablet    NORVASC    90 tablet    Take 1 tablet (5 mg) by mouth daily    Acute chest pain       aspirin 81 MG tablet      Take by mouth daily        atorvastatin 20 MG tablet    LIPITOR    90 tablet    Take 1 tablet (20 mg) by mouth daily You are rox to see your Cardiologist call [phone] to schedule.    NSTEMI (non-ST elevated myocardial infarction) (H)       BETA BLOCKER NOT PRESCRIBED (INTENTIONAL)      Beta Blocker not prescribed intentionally due to Bradycardia < 50 bpm without beta blocker therapy    NSTEMI (non-ST elevated myocardial infarction) (H)       calcium citrate-vitamin D 315-200 MG-UNIT Tabs per tablet    CITRACAL     Take 1 tablet by mouth daily.        clopidogrel 75 MG tablet    PLAVIX    30 tablet    Take 1 tablet (75 mg) by mouth daily Please make a follow visit for further refills.    NSTEMI (non-ST elevated myocardial infarction) (H)       entecavir 0.5 MG tablet    BARACLUDE    90 tablet    Take 1 tablet (0.5 mg) by mouth daily    Chronic viral hepatitis B without delta agent and without coma (H), Chronic hepatitis B (H)       FLUoxetine 40 MG capsule    PROzac    90 capsule    Take 1 capsule (40 mg) by mouth every morning    Major depressive disorder, recurrent episode, moderate (H)       fluticasone 50 MCG/ACT spray    FLONASE    3 Bottle    Spray 1-2 sprays into both nostrils daily    Bronchitis with bronchospasm       guaiFENesin-codeine 100-10 MG/5ML Soln solution    ROBITUSSIN AC    420 mL    Take 5-10 mLs by mouth every 4 hours as needed  for cough    Cough       ibuprofen 200 MG tablet    ADVIL/MOTRIN     Take 200 mg by mouth every 4 hours as needed for mild pain    Bronchitis, Essential hypertension, Coronary artery disease involving native heart without angina pectoris, unspecified vessel or lesion type       isosorbide mononitrate 30 MG 24 hr tablet    IMDUR    15 tablet    Take 0.5 tablets (15 mg) by mouth daily Please make a follow up appointment for further refills.    Acute chest pain       latanoprost 0.005 % ophthalmic solution    XALATAN    3 Bottle    Place 1 drop into both eyes At Bedtime    Borderline glaucoma with ocular hypertension, bilateral       losartan 50 MG tablet    COZAAR    90 tablet    Take 0.5 tablets (25 mg) by mouth daily    Hypertension, unspecified type       * montelukast 10 MG tablet    SINGULAIR    30 tablet    Take 1 tablet (10 mg) by mouth At Bedtime    Cough       * montelukast 10 MG tablet    SINGULAIR    30 tablet    Take 1 tablet (10 mg) by mouth At Bedtime    Bronchitis with bronchospasm       MULTIVITAMIN/IRON PO      Take 1 tablet by mouth daily        mycophenolate 250 MG capsule    GENERIC EQUIVALENT    180 capsule    Take 3 capsules (750 mg) by mouth 2 times daily    Kidney transplanted       OMEGA 3 PO      Take 1 capsule by mouth daily        omeprazole 20 MG tablet    priLOSEC OTC    30 tablet    Take  by mouth daily.    Chronic hepatitis B (H), HTN (hypertension), Depression, Unspecified essential hypertension       * predniSONE 20 MG tablet    DELTASONE    5 tablet    Take 1 tablet (20 mg) by mouth daily    Acute bronchospasm       * predniSONE 5 MG tablet    DELTASONE    90 tablet    Take 1 tablet (5 mg) by mouth daily    Kidney transplanted       TYLENOL 325 MG tablet   Generic drug:  acetaminophen      Take 1-2 tablets by mouth every 6 hours as needed.        * Notice:  This list has 6 medication(s) that are the same as other medications prescribed for you. Read the directions carefully, and ask  your doctor or other care provider to review them with you.

## 2018-04-16 ENCOUNTER — MYC MEDICAL ADVICE (OUTPATIENT)
Dept: INTERNAL MEDICINE | Facility: CLINIC | Age: 58
End: 2018-04-16

## 2018-04-16 DIAGNOSIS — R05.9 COUGH: ICD-10-CM

## 2018-04-17 ENCOUNTER — RADIANT APPOINTMENT (OUTPATIENT)
Dept: CT IMAGING | Facility: CLINIC | Age: 58
End: 2018-04-17
Attending: INTERNAL MEDICINE
Payer: MEDICARE

## 2018-04-17 DIAGNOSIS — R05.3 CHRONIC COUGH: ICD-10-CM

## 2018-04-17 DIAGNOSIS — R05.9 COUGH: ICD-10-CM

## 2018-04-17 DIAGNOSIS — R05.9 COUGH: Primary | ICD-10-CM

## 2018-04-17 DIAGNOSIS — I21.4 NSTEMI (NON-ST ELEVATED MYOCARDIAL INFARCTION) (H): ICD-10-CM

## 2018-04-17 RX ORDER — CODEINE PHOSPHATE AND GUAIFENESIN 10; 100 MG/5ML; MG/5ML
1-2 SOLUTION ORAL EVERY 4 HOURS PRN
Qty: 420 ML | Refills: 0 | Status: SHIPPED | OUTPATIENT
Start: 2018-04-17 | End: 2018-06-12

## 2018-04-18 RX ORDER — CLOPIDOGREL BISULFATE 75 MG/1
75 TABLET ORAL DAILY
Qty: 30 TABLET | Refills: 2 | OUTPATIENT
Start: 2018-04-18

## 2018-04-20 DIAGNOSIS — I21.4 NSTEMI (NON-ST ELEVATED MYOCARDIAL INFARCTION) (H): ICD-10-CM

## 2018-04-20 NOTE — TELEPHONE ENCOUNTER
clopidogrel (PLAVIX) 75 MG tablet   Last Written Prescription Date:  11/9/17  Last Fill Quantity: 30,   # refills: 2  Last Office Visit : 4/10/18  Future Office visit: 5/1/18    Routing  Because:  hgb

## 2018-04-23 RX ORDER — CLOPIDOGREL BISULFATE 75 MG/1
75 TABLET ORAL DAILY
Qty: 30 TABLET | Refills: 2 | Status: SHIPPED | OUTPATIENT
Start: 2018-04-23 | End: 2018-05-08

## 2018-04-25 DIAGNOSIS — R05.9 COUGH: Primary | ICD-10-CM

## 2018-04-25 LAB
DLCOUNC-%PRED-PRE: 68 %
DLCOUNC-PRE: 19.6 ML/MIN/MMHG
DLCOUNC-PRED: 28.45 ML/MIN/MMHG
ERV-%PRED-PRE: 44 %
ERV-PRE: 0.52 L
ERV-PRED: 1.18 L
EXPTIME-PRE: 9.24 SEC
FEF2575-%PRED-POST: 59 %
FEF2575-%PRED-PRE: 46 %
FEF2575-POST: 1.82 L/SEC
FEF2575-PRE: 1.43 L/SEC
FEF2575-PRED: 3.06 L/SEC
FEFMAX-%PRED-PRE: 58 %
FEFMAX-PRE: 5.27 L/SEC
FEFMAX-PRED: 9.04 L/SEC
FEV1-%PRED-PRE: 58 %
FEV1-PRE: 2.05 L
FEV1FEV6-PRE: 74 %
FEV1FEV6-PRED: 80 %
FEV1FVC-PRE: 72 %
FEV1FVC-PRED: 76 %
FEV1SVC-PRE: 71 %
FEV1SVC-PRED: 74 %
FIFMAX-PRE: 4.77 L/SEC
FRCPLETH-%PRED-PRE: 72 %
FRCPLETH-PRE: 2.49 L
FRCPLETH-PRED: 3.46 L
FVC-%PRED-PRE: 63 %
FVC-PRE: 2.83 L
FVC-PRED: 4.46 L
IC-%PRED-PRE: 66 %
IC-PRE: 2.35 L
IC-PRED: 3.55 L
RVPLETH-%PRED-PRE: 86 %
RVPLETH-PRE: 1.97 L
RVPLETH-PRED: 2.27 L
TLCPLETH-%PRED-PRE: 72 %
TLCPLETH-PRE: 4.85 L
TLCPLETH-PRED: 6.72 L
VA-%PRED-PRE: 61 %
VA-PRE: 3.94 L
VC-%PRED-PRE: 60 %
VC-PRE: 2.88 L
VC-PRED: 4.73 L

## 2018-05-01 ENCOUNTER — OFFICE VISIT (OUTPATIENT)
Dept: INTERNAL MEDICINE | Facility: CLINIC | Age: 58
End: 2018-05-01
Payer: MEDICARE

## 2018-05-01 VITALS
DIASTOLIC BLOOD PRESSURE: 70 MMHG | SYSTOLIC BLOOD PRESSURE: 102 MMHG | RESPIRATION RATE: 18 BRPM | HEART RATE: 78 BPM | OXYGEN SATURATION: 97 % | WEIGHT: 171 LBS | BODY MASS INDEX: 26 KG/M2

## 2018-05-01 DIAGNOSIS — J98.4 RESTRICTIVE LUNG DISEASE: Primary | ICD-10-CM

## 2018-05-01 ASSESSMENT — PAIN SCALES - GENERAL: PAINLEVEL: MILD PAIN (3)

## 2018-05-01 NOTE — PROGRESS NOTES
HPI  58-year-old returns today for reevaluation of his cough.  He has noted some improvement in the cough but he is still coughing.  This is largely nonproductive there is no definitive precipitating factors.  He has been tolerating exercise and exertion better as well with this now.  He otherwise has not had any fever has not had any chills or sweats.  He has no other complaints or problems today.  Past Medical History:   Diagnosis Date     AION (acute ischemic optic neuropathy)      Anemia in chronic renal disease      Avascular necrosis of bones of both hips (H)     s/p bilateral hip replacements     Basal cell carcinoma      CAD (coronary artery disease) 6/17/2014     Chronic hepatitis B (H)      Clostridium difficile colitis      Coronary artery disease involving native coronary artery of native heart without angina pectoris 6/17/2014    Coronary angiogram 6/17/14: Severe distal 3-vessel disease involving small vessels, not amenable to PCI or CABG.     CRP elevated      Depression      Diverticulosis      Dyslipidemia      FSGS (focal segmental glomerulosclerosis)      Gastric ulcer with hemorrhage 2/12/12     GERD (gastroesophageal reflux disease)      Glaucoma     OHTN     Hemorrhoids      Hypertension secondary to other renal disorders      Hypogonadism in male      Immunosuppressed status (H)      Kidney replaced by transplant     focal glomerulosclerosis      NSTEMI (non-ST elevated myocardial infarction) (H) 6/17/2014     JULIANE (obstructive sleep apnea)     Doesn't use CPAP     Paracentral scotoma     LE     Secondary renal hyperparathyroidism (H)      Squamous cell carcinoma      Past Surgical History:   Procedure Laterality Date     APPENDECTOMY       CATARACT IOL, RT/LT  4/19/2000    RE     CATARACT IOL, RT/LT  6/1/2000    LE     COLECTOMY SUBTOTAL  1983    10 cm, diverticulitis     COLONOSCOPY  2/13/2012    Procedure:COLONOSCOPY; Surgeon:SLOAN GALLARDO; Location: GI     ESOPHAGOSCOPY,  GASTROSCOPY, DUODENOSCOPY (EGD), COMBINED  2/13/2012    Procedure:COMBINED ESOPHAGOSCOPY, GASTROSCOPY, DUODENOSCOPY (EGD); Surgeon:SLOAN GALLARDO; Location:[facility]     EXTRACAPSULAR CATARACT EXTRATION WITH INTRAOCULAR LENS IMPLANT  4-20-10, 6-1-10    Rt, Lt     HERNIA REPAIR  1995    Lt inguinal     HIP SURGERY      1981, bilat MITZI, revised 2001, 2005     KIDNEY SURGERY  1978 and 1993    transplant     KNEE SURGERY  1983, 1987    bilat TKA     MOHS MICROGRAPHIC PROCEDURE       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, auxiliary spleen     TONSILLECTOMY       Family History   Problem Relation Age of Onset     Cardiovascular Father      AI with valve repair     Hypertension Father      KIDNEY DISEASE Father      CANCER Paternal Grandmother      ovarian ca     CEREBROVASCULAR DISEASE Paternal Grandfather      CEREBROVASCULAR DISEASE Maternal Grandfather      KIDNEY DISEASE Paternal Aunt      Skin Cancer No family hx of      Glaucoma No family hx of      Macular Degeneration No family hx of      Amblyopia No family hx of      Social History     Social History     Marital status:      Spouse name: N/A     Number of children: 0     Years of education: N/A     Occupational History      Disabled      [employer]     Social History Main Topics     Smoking status: Former Smoker     Packs/day: 1.00     Years: 9.00     Quit date: 1/1/1988     Smokeless tobacco: Former User     Alcohol use 0.0 oz/week     0 Standard drinks or equivalent per week      Comment: rarely 1 drink per month     Drug use: No     Sexual activity: Not Asked     Other Topics Concern     Special Diet No     Exercise Yes     walks     Social History Narrative    . Wife is also a kidney transplant recipient.     Works part-time       Exam:  /70 (BP Location: Right arm, Patient Position: Sitting, Cuff Size: Adult Regular)  Pulse 78  Resp 18  Wt 77.6 kg (171 lb)  SpO2 97%  BMI 26 kg/m2  171 lbs 0 oz  The patient is  alert, oriented with a clear sensorium.   Skin shows no lesions or rashes and good turgor.   Head is normocephalic and atraumatic.    Neck shows no nodes, thyromegaly.     Lungs are clear.   Heart shows normal S1 and S2 without murmur or gallop.    PFTs and Ct reviewed with him:    Results for orders placed or performed in visit on 04/17/18   CT Chest w/o contrast    Narrative    EXAMINATION: Chest CT  4/17/2018 1:10 PM    CLINICAL HISTORY: 57yo w/ history of renal transplant x 2, who; Cough;  Chronic cough    COMPARISON: X-ray 2/15/2018.    TECHNIQUE: CT imaging obtained through the chest without contrast.  Coronal and axial MIP reformatted images obtained.     FINDINGS:  Heart size is normal. No pericardial effusion. Moderate coronary  artery calcification. Dilated main pulmonary artery at 3.2 cm. Ectatic  ascending thoracic aorta at 4.1 cm. No thoracic lymphadenopathy.  Central tracheobronchial tree is patent.     Scattered centrilobular groundglass opacities, most prominent in the  lingula and medial left lower lobe. No pleural effusion or  pneumothorax.    Bones and soft tissues: No suspicious bone findings. Gynecomastia.    Partially imaged upper abdomen: Atrophic native kidneys.      Impression    IMPRESSION:   1. Scattered groundglass opacities, most prominent in the lingula and  medial left lower lobe, favor atypical infection.  2. Ectatic ascending thoracic aorta at 4.1 cm.  3. Dilated main pulmonary artery 3.2 cm, consistent with pulmonary  hypertension.    I have personally reviewed the examination and initial interpretation  and I agree with the findings.    AGSTON CAMARENA MD     ASSESSMENT  1 restrictive lung disease with abnormal CT possible pulmonary fibrosis.  2 enlarged pulmonary artery possibly pulmonary hypertension  3 status post kidney transplant  4 coronary artery disease stable  5 GERD    Plan  We will have him see pulmonary for their opinion regarding both his restrictive lung disease and CT  appearance symptomatically is improving and doing well so will provide no treatment at this time.  Discussed the plan with the patient who agrees  Over 25 minutes spent with patient the majority in counseling and coordinating care.    This note was completed using Dragon voice recognition software.  Although reviewed after completion, some word and grammatical errors may occur.    Aston Perry MD  General Internal Medicine  [facility]  [phone]

## 2018-05-01 NOTE — MR AVS SNAPSHOT
After Visit Summary   5/1/2018    Jerad Ross    MRN: [id number]           Patient Information     Date Of Birth          1960        Visit Information        Provider Department      5/1/2018 10:20 AM Aston Perry MD [facility]        Today's Diagnoses     Restrictive lung disease    -  1       Follow-ups after your visit        Additional Services     PULMONARY MEDICINE REFERRAL       Your provider has referred you to: [facility]: [place] [facility] [phone]   [email or web address]    Please be aware that coverage of these services is subject to the terms and limitations of your health insurance plan.  Call member services at your health plan with any benefit or coverage questions.      Please bring the following with you to your appointment:    (1) Any X-Rays, CTs or MRIs which have been performed.  Contact the facility where they were done to arrange for  prior to your scheduled appointment.    (2) List of current medications   (3) This referral request   (4) Any documents/labs given to you for this referral                  Your next 10 appointments already scheduled     May 08, 2018 12:30 PM CDT   (Arrive by 12:15 PM)   New Patient Visit with Balta Orantes MD   [facility] ([facility])    [address]  [address]  [place] [place] [facility] [id number] [phone]            May 10, 2018  4:00 PM CDT   Adult Med Follow UP with RONALDO Dempsey CNP   Psychiatry Clinic ([facility])    [address]  [address] [id number] [phone]            May 14, 2018  2:45 PM CDT   RETURN GLAUCOMA with Viki Rizzo MD   [facility] Clinic ([facility])    [address]  [address]  [place] [facility] [address] [phone]            Garett  12, 2018  1:30 PM CDT   (Arrive by 1:00 PM)   Return Kidney Transplant with Nolan Lovett MD   [facility] ([facility])    [address]  [address]  [place] [address] [phone]            Jun 21, 2018  9:00 AM CDT   (Arrive by 8:45 AM)   New Patient Visit with Sisi Lockwood MD   [facility] ([facility])    [address]  [address]  [place] [address] [phone]              Who to contact     Please call your clinic at [phone] to:    Ask questions about your health    Make or cancel appointments    Discuss your medicines    Learn about your test results    Speak to your doctor            Additional Information About Your Visit        Going [employer] Information     [employer] gives you secure access to your electronic health record. If you see a primary care provider, you can also send messages to your care team and make appointments. If you have questions, please call your primary care clinic.  If you do not have a primary care provider, please call [phone] and they will assist you.      [employer] is an electronic gateway that provides easy, online access to your medical records. With [employer], you can request a clinic appointment, read your test results, renew a prescription or communicate with your care team.     To access your existing account, please contact your [facility] or call [phone] for assistance.        Care EveryWhere ID     This is your Care EveryWhere ID. This could be used by other organizations to access your [place] medical records  [id number]        Your Vitals Were     Pulse Respirations Pulse Oximetry BMI (Body Mass Index)          78 18 97% 26 kg/m2         Blood Pressure from Last 3 Encounters:   05/01/18 102/70   04/10/18 97/65   03/13/18 111/71    Weight from Last 3 Encounters:   05/01/18 77.6 kg (171 lb)   04/10/18  79.8 kg (175 lb 14.4 oz)   03/13/18 80.6 kg (177 lb 11.2 oz)              We Performed the Following     PULMONARY MEDICINE REFERRAL        Primary Care Provider Office Phone # Fax #    Aston Perry [phone]       [address] [place] [address]        Equal Access to Services     [facility]: Hadii aad ku hadasho Soomaali, waaxda luqadaha, qaybta kaalmada adeegyada, waxay idiin hayaan adeeg kharash la'aan . So [facility] [phone].    ATENCIÓN: Si habla español, tiene a sanchez disposición servicios gratuitos de asistencia lingüística. Ramsesame al [phone].    We comply with applicable federal civil rights laws and [place] laws. We do not discriminate on the basis of race, color, national origin, age, disability, sex, sexual orientation, or gender identity.            Thank you!     Thank you for choosing [facility]  for your care. Our goal is always to provide you with excellent care. Hearing back from our patients is one way we can continue to improve our services. Please take a few minutes to complete the written survey that you may receive in the mail after your visit with us. Thank you!             Your Updated Medication List - Protect others around you: Learn how to safely use, store and throw away your medicines at www.disposemymeds.org.          This list is accurate as of 5/1/18 11:59 PM.  Always use your most recent med list.                   Brand Name Dispense Instructions for use Diagnosis    * albuterol 108 (90 Base) MCG/ACT Inhaler    PROAIR HFA/PROVENTIL HFA/VENTOLIN HFA    6.7 g    Inhale 2 puffs into the lungs every 4 hours as needed for shortness of breath / dyspnea or wheezing    Acute bronchospasm       * albuterol 108 (90 Base) MCG/ACT Inhaler    PROAIR HFA    1 Inhaler    Inhale 2 puffs into the lungs every 6 hours as needed for shortness of breath / dyspnea or wheezing    Cough, Wheezing       amLODIPine 5 MG tablet    NORVASC     90 tablet    Take 1 tablet (5 mg) by mouth daily    Acute chest pain       aspirin 81 MG tablet      Take by mouth daily        atorvastatin 20 MG tablet    LIPITOR    90 tablet    Take 1 tablet (20 mg) by mouth daily You are rox to see your Cardiologist call [phone] to schedule.    NSTEMI (non-ST elevated myocardial infarction) (H)       BETA BLOCKER NOT PRESCRIBED (INTENTIONAL)      Beta Blocker not prescribed intentionally due to Bradycardia < 50 bpm without beta blocker therapy    NSTEMI (non-ST elevated myocardial infarction) (H)       calcium citrate-vitamin D 315-200 MG-UNIT Tabs per tablet    CITRACAL     Take 1 tablet by mouth daily.        clopidogrel 75 MG tablet    PLAVIX    30 tablet    Take 1 tablet (75 mg) by mouth daily    NSTEMI (non-ST elevated myocardial infarction) (H)       entecavir 0.5 MG tablet    BARACLUDE    90 tablet    Take 1 tablet (0.5 mg) by mouth daily    Chronic viral hepatitis B without delta agent and without coma (H), Chronic hepatitis B (H)       FLUoxetine 40 MG capsule    PROzac    90 capsule    Take 1 capsule (40 mg) by mouth every morning    Major depressive disorder, recurrent episode, moderate (H)       fluticasone 50 MCG/ACT spray    FLONASE    3 Bottle    Spray 1-2 sprays into both nostrils daily    Bronchitis with bronchospasm       guaiFENesin-codeine 100-10 MG/5ML Soln solution    ROBITUSSIN AC    420 mL    Take 5-10 mLs by mouth every 4 hours as needed for cough    Cough       ibuprofen 200 MG tablet    ADVIL/MOTRIN     Take 200 mg by mouth every 4 hours as needed for mild pain    Bronchitis, Essential hypertension, Coronary artery disease involving native heart without angina pectoris, unspecified vessel or lesion type       isosorbide mononitrate 30 MG 24 hr tablet    IMDUR    15 tablet    Take 0.5 tablets (15 mg) by mouth daily Please make a follow up appointment for further refills.    Acute chest pain       latanoprost 0.005 % ophthalmic solution     XALATAN    3 Bottle    Place 1 drop into both eyes At Bedtime    Borderline glaucoma with ocular hypertension, bilateral       losartan 50 MG tablet    COZAAR    90 tablet    Take 0.5 tablets (25 mg) by mouth daily    Hypertension, unspecified type       * montelukast 10 MG tablet    SINGULAIR    30 tablet    Take 1 tablet (10 mg) by mouth At Bedtime    Cough       * montelukast 10 MG tablet    SINGULAIR    30 tablet    Take 1 tablet (10 mg) by mouth At Bedtime    Bronchitis with bronchospasm       MULTIVITAMIN/IRON PO      Take 1 tablet by mouth daily        mycophenolate 250 MG capsule    GENERIC EQUIVALENT    180 capsule    Take 3 capsules (750 mg) by mouth 2 times daily    Kidney transplanted       OMEGA 3 PO      Take 1 capsule by mouth daily        omeprazole 20 MG tablet    priLOSEC OTC    30 tablet    Take  by mouth daily.    Chronic hepatitis B (H), HTN (hypertension), Depression, Unspecified essential hypertension       * predniSONE 20 MG tablet    DELTASONE    5 tablet    Take 1 tablet (20 mg) by mouth daily    Acute bronchospasm       * predniSONE 5 MG tablet    DELTASONE    90 tablet    Take 1 tablet (5 mg) by mouth daily    Kidney transplanted       TYLENOL 325 MG tablet   Generic drug:  acetaminophen      Take 1-2 tablets by mouth every 6 hours as needed.        * Notice:  This list has 6 medication(s) that are the same as other medications prescribed for you. Read the directions carefully, and ask your doctor or other care provider to review them with you.

## 2018-05-01 NOTE — NURSING NOTE
Chief Complaint   Patient presents with     Cough     Patient is here to discuss ongoing cough after PFT and CT     Viridiana Artis CMA 10:26 AM on 5/1/2018.

## 2018-05-08 ENCOUNTER — OFFICE VISIT (OUTPATIENT)
Dept: CARDIOLOGY | Facility: CLINIC | Age: 58
End: 2018-05-08
Attending: INTERNAL MEDICINE
Payer: MEDICARE

## 2018-05-08 VITALS
OXYGEN SATURATION: 97 % | SYSTOLIC BLOOD PRESSURE: 101 MMHG | HEIGHT: 67 IN | WEIGHT: 172.7 LBS | BODY MASS INDEX: 27.11 KG/M2 | DIASTOLIC BLOOD PRESSURE: 71 MMHG | HEART RATE: 83 BPM

## 2018-05-08 DIAGNOSIS — E78.5 DYSLIPIDEMIA: ICD-10-CM

## 2018-05-08 DIAGNOSIS — I21.4 NSTEMI (NON-ST ELEVATED MYOCARDIAL INFARCTION) (H): ICD-10-CM

## 2018-05-08 DIAGNOSIS — R93.89 ABNORMAL CT SCAN, CHEST: ICD-10-CM

## 2018-05-08 DIAGNOSIS — I71.20 THORACIC AORTIC ANEURYSM WITHOUT RUPTURE (H): ICD-10-CM

## 2018-05-08 DIAGNOSIS — I25.10 CORONARY ARTERY DISEASE INVOLVING NATIVE CORONARY ARTERY OF NATIVE HEART WITHOUT ANGINA PECTORIS: Primary | ICD-10-CM

## 2018-05-08 DIAGNOSIS — I15.1 HYPERTENSION SECONDARY TO OTHER RENAL DISORDERS: ICD-10-CM

## 2018-05-08 PROCEDURE — G0463 HOSPITAL OUTPT CLINIC VISIT: HCPCS | Mod: ZF

## 2018-05-08 PROCEDURE — 99215 OFFICE O/P EST HI 40 MIN: CPT | Mod: ZP | Performed by: INTERNAL MEDICINE

## 2018-05-08 RX ORDER — CLOPIDOGREL BISULFATE 75 MG/1
75 TABLET ORAL DAILY
Qty: 90 TABLET | Refills: 3 | Status: SHIPPED | OUTPATIENT
Start: 2018-05-08 | End: 2018-05-31

## 2018-05-08 ASSESSMENT — PAIN SCALES - GENERAL: PAINLEVEL: NO PAIN (0)

## 2018-05-08 NOTE — LETTER
5/8/2018      RE: Jerad Ross  [address]   [facility] [id number]       Dear Colleague,    Thank you for the opportunity to participate in the care of your patient, Jerad Ross, at the [facility] HEART [facility]. Please see a copy of my visit note below.    Chief complaint: follow up CAD    HPI:   Jerad Ross is a 58 year old male with history of severe 3-vessel CAD involving small distal vessels not amenable to revascularization, HTN, HL, and renal transplant 1993 previously followed by Fausto [facility] and March who presents to establish cardiology follow up.    He had NSTEMI in 6/17/14, which presented with chest pain waking him from sleep. Coronary angiogram showed severe 3-vessel CAD involving small distal vessels, not amenable to revascularization. He was started on clopidogrel, isosorbide mononitrate, and atorvastatin at that time. He is not on a beta blocker due baseline bradycardia.     He has done well since that time from the cardiac standpoint, and remains stably free of anginal symptoms.      He denies any chest pain, dyspnea at rest or with exertion, PND, orthopnea, peripheral edema, palpitations, lightheadedness or syncope.       PAST MEDICAL HISTORY:  Past Medical History:   Diagnosis Date     AION (acute ischemic optic neuropathy)      Anemia in chronic renal disease      Avascular necrosis of bones of both hips (H)     s/p bilateral hip replacements     Basal cell carcinoma      Chronic hepatitis B (H)      Clostridium difficile colitis      Coronary artery disease involving native coronary artery of native heart without angina pectoris 6/17/2014    Coronary angiogram 6/17/14: Severe distal 3-vessel disease involving small vessels, not amenable to PCI or CABG.     CRP elevated      Depression      Diverticulosis      Dyslipidemia      FSGS (focal segmental glomerulosclerosis)      Gastric ulcer with hemorrhage 2/12/12     GERD  (gastroesophageal reflux disease)      Glaucoma     OHTN     Hemorrhoids      Hypertension secondary to other renal disorders      Hypogonadism in male      Immunosuppressed status (H)      Kidney replaced by transplant     focal glomerulosclerosis      NSTEMI (non-ST elevated myocardial infarction) (H) 6/17/2014     JULIANE (obstructive sleep apnea)     Doesn't use CPAP     Paracentral scotoma     LE     Secondary renal hyperparathyroidism (H)      Squamous cell carcinoma        CURRENT MEDICATIONS:  Current Outpatient Prescriptions   Medication Sig Dispense Refill     acetaminophen (TYLENOL) 325 MG tablet Take 1-2 tablets by mouth every 6 hours as needed.       albuterol (PROAIR HFA) 108 (90 Base) MCG/ACT Inhaler Inhale 2 puffs into the lungs every 6 hours as needed for shortness of breath / dyspnea or wheezing 1 Inhaler 2     albuterol (PROAIR HFA/PROVENTIL HFA/VENTOLIN HFA) 108 (90 BASE) MCG/ACT Inhaler Inhale 2 puffs into the lungs every 4 hours as needed for shortness of breath / dyspnea or wheezing 6.7 g 1     amLODIPine (NORVASC) 5 MG tablet Take 1 tablet (5 mg) by mouth daily 90 tablet 1     aspirin 81 MG tablet Take by mouth daily       atorvastatin (LIPITOR) 20 MG tablet Take 1 tablet (20 mg) by mouth daily You are rox to see your Cardiologist call [phone] to schedule. 90 tablet 1     calcium citrate-vitamin D (CITRACAL) 315-200 MG-UNIT TABS Take 1 tablet by mouth daily.       clopidogrel (PLAVIX) 75 MG tablet Take 1 tablet (75 mg) by mouth daily 30 tablet 2     entecavir (BARACLUDE) 0.5 MG tablet Take 1 tablet (0.5 mg) by mouth daily 90 tablet 3     FLUoxetine (PROZAC) 40 MG capsule Take 1 capsule (40 mg) by mouth every morning 90 capsule 0     fluticasone (FLONASE) 50 MCG/ACT spray Spray 1-2 sprays into both nostrils daily 3 Bottle 11     guaiFENesin-codeine (ROBITUSSIN AC) 100-10 MG/5ML SOLN solution Take 5-10 mLs by mouth every 4 hours as needed for cough 420 mL 0     ibuprofen (ADVIL,MOTRIN) 200 MG  tablet Take 200 mg by mouth every 4 hours as needed for mild pain       isosorbide mononitrate (IMDUR) 30 MG 24 hr tablet Take 0.5 tablets (15 mg) by mouth daily Please make a follow up appointment for further refills. 15 tablet 0     latanoprost (XALATAN) 0.005 % ophthalmic solution Place 1 drop into both eyes At Bedtime 3 Bottle 3     montelukast (SINGULAIR) 10 MG tablet Take 1 tablet (10 mg) by mouth At Bedtime 30 tablet 3     montelukast (SINGULAIR) 10 MG tablet Take 1 tablet (10 mg) by mouth At Bedtime 30 tablet 1     Multiple Vitamins-Iron (MULTIVITAMIN/IRON PO) Take 1 tablet by mouth daily        mycophenolate (CELLCEPT - GENERIC EQUIVALENT) 250 MG capsule Take 3 capsules (750 mg) by mouth 2 times daily 180 capsule 11     Omega-3 Fatty Acids (OMEGA 3 PO) Take 1 capsule by mouth daily       omeprazole (PRILOSEC OTC) 20 MG tablet Take  by mouth daily. 30 tablet      predniSONE (DELTASONE) 5 MG tablet Take 1 tablet (5 mg) by mouth daily 90 tablet 3     BETA BLOCKER NOT PRESCRIBED, INTENTIONAL, Beta Blocker not prescribed intentionally due to Bradycardia < 50 bpm without beta blocker therapy  0     losartan (COZAAR) 50 MG tablet Take 0.5 tablets (25 mg) by mouth daily (Patient not taking: Reported on 5/8/2018) 90 tablet 3     predniSONE (DELTASONE) 20 MG tablet Take 1 tablet (20 mg) by mouth daily (Patient not taking: Reported on 5/8/2018) 5 tablet 0       PAST SURGICAL HISTORY:  Past Surgical History:   Procedure Laterality Date     APPENDECTOMY       CATARACT IOL, RT/LT  4/19/2000    RE     CATARACT IOL, RT/LT  6/1/2000    LE     COLECTOMY SUBTOTAL  1983    10 cm, diverticulitis     COLONOSCOPY  2/13/2012    Procedure:COLONOSCOPY; Surgeon:SLOAN GALLARDO; Location: GI     ESOPHAGOSCOPY, GASTROSCOPY, DUODENOSCOPY (EGD), COMBINED  2/13/2012    Procedure:COMBINED ESOPHAGOSCOPY, GASTROSCOPY, DUODENOSCOPY (EGD); Surgeon:SLOAN GALLARDO; Location:[facility]     EXTRACAPSULAR CATARACT EXTRATION  "WITH INTRAOCULAR LENS IMPLANT  4-20-10, 6-1-10    Rt, Lt     HERNIA REPAIR  1995    Lt inguinal     HIP SURGERY      1981, bilat MITZI, revised 2001, 2005     KIDNEY SURGERY  1978 and 1993    transplant     KNEE SURGERY  1983, 1987    bilat TKA     MOHS MICROGRAPHIC PROCEDURE       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, auxiliary spleen     TONSILLECTOMY         ALLERGIES:  Allergies   Allergen Reactions     Penicillins Shortness Of Breath and Hives     Contrast Dye Nausea and Vomiting     Keflex [Cephalexin Hcl] Other (See Comments)     Pt could not recall reaction     Tetracycline Other (See Comments)     Patient could not recall reaction     Sulfa Drugs Rash       FAMILY HISTORY:  Family History   Problem Relation Age of Onset     Cardiovascular Father      AI with valve repair     Hypertension Father      KIDNEY DISEASE Father      CANCER Paternal Grandmother      ovarian ca     CEREBROVASCULAR DISEASE Paternal Grandfather      CEREBROVASCULAR DISEASE Maternal Grandfather      KIDNEY DISEASE Paternal Aunt      Skin Cancer No family hx of      Glaucoma No family hx of      Macular Degeneration No family hx of      Amblyopia No family hx of    No family history of premature CAD or sudden death.    SOCIAL HISTORY:  Social History   Substance Use Topics     Smoking status: Former Smoker     Packs/day: 1.00     Years: 9.00     Quit date: 1/1/1988     Smokeless tobacco: Former User     Alcohol use 0.0 oz/week     0 Standard drinks or equivalent per week      Comment: rarely 1 drink per month     ROS:   A comprehensive 14 point review of systems is negative other than as mentioned in HPI.    Exam:  /71 (BP Location: Right arm, Patient Position: Chair, Cuff Size: Adult Regular)  Pulse 83  Ht 1.702 m (5' 7\")  Wt 78.3 kg (172 lb 11.2 oz)  SpO2 97%  BMI 27.05 kg/m2  GENERAL APPEARANCE: healthy, alert and no distress  EYES: no icterus, no xanthelasmas  ENT: normal palate, mucosa moist, no central cyanosis  NECK: no " adenopathy, no asymmetry, masses, or scars, thyroid normal to palpation and no bruits, JVP not elevated  RESPIRATORY: +dry crackles bilateral bases, no use of accessory muscles, no retractions, respirations are unlabored, normal respiratory rate  CARDIOVASCULAR: regular rhythm, normal S1 with physiologic split S2, no S3 or S4 and no murmur, click or rub, precordium quiet with normal PMI.  GI: soft, non tender, without hepatosplenomegaly, no masses palpable, bowel sounds normal, aorta not enlarged by palpation, no abdominal bruits  EXTREMITIES: peripheral pulses normal, no edema, no bruits  NEURO: alert and oriented to person/place/time, normal speech, gait and affect  VASC: Radial, dorsalis pedis and posterior tibialis pulses 2+ bilaterally.  SKIN: no ecchymoses, no rashes.  PSYCH: cooperative, affect appropriate.     Labs:  Reviewed.     Testing/Procedures:  I personally visualized and interpreted:  ECG 5/23/15: sinus shanna, VR 56, inferior Q-waves.     Coronary angiogram 6/17/2014:  Severe distal CAD involving small vessels of all 3 coronary arteries.     TTE 5/23/15: Normal biventricular size/function, LVEF=55-60%. No significant valvular disease.       Assessment and Plan:   1. Severe small vessel CAD, unrevascularizable:  Free of anginal symptoms. Continue present ASA, amlodipine, Imdur, statin. Not on beta blocker due to baseline bradycardia.    2. Enlarged PA on CTA: No clinical stigmata of pulmonary hypertension (and PFTs without disproportionate diffusion limitation)-- suspicions is relatively low, but TTE to exclude PH seems reasonable in light of CT findings.    3. Thoracic aortic aneurysm on CT chest (4.1 cm):  -gated MRA aorta      >50% of this 60-minute visit were spent with the patient on in-person counseling and discussion regarding the above-described diagnostic and therapeutic plan, the rationale for it, and answering all questions.     The patient states understanding and is agreeable with plan.      Balta Orantes MD  Cardiology    CC  [facility]ELS[facility]

## 2018-05-08 NOTE — PATIENT INSTRUCTIONS
You were seen at the [facility] clinic today.  You saw Dr. Balta Orantes  Here are your Instructions:    1. Echocardiogram in 1-2 weeks.  2. Follow up in 1 year.       If you have questions after this visit:  Send a TellMi message or contact Sonya Avilez Nurse Care Coordinator  Office:  [phone] option #1, then #3 & ask for Sonya (nurse line)  Fax:  [phone]  After Hours:  [phone]  Appointments:  [phone] option #1, then option #1

## 2018-05-08 NOTE — NURSING NOTE
Chief Complaint   Patient presents with     Follow Up For     58yr old male with a h/o CAD, HTN and HLD presenting for follow up. Former [facility] patient.    Vitals were taken and medications were reconciled.     Rafia Sunshine MA    12:14 PM

## 2018-05-08 NOTE — PROGRESS NOTES
Chief complaint: follow up CAD    HPI:   Jerad Ross is a 58 year old male with history of severe 3-vessel CAD involving small distal vessels not amenable to revascularization, HTN, HL, and renal transplant 1993 previously followed by Fausto [facility] and March who presents to establish cardiology follow up.    He had NSTEMI in 6/17/14, which presented with chest pain waking him from sleep. Coronary angiogram showed severe 3-vessel CAD involving small distal vessels, not amenable to revascularization. He was started on clopidogrel, isosorbide mononitrate, and atorvastatin at that time. He is not on a beta blocker due baseline bradycardia.     He has done well since that time from the cardiac standpoint, and remains stably free of anginal symptoms.      He denies any chest pain, dyspnea at rest or with exertion, PND, orthopnea, peripheral edema, palpitations, lightheadedness or syncope.       PAST MEDICAL HISTORY:  Past Medical History:   Diagnosis Date     AION (acute ischemic optic neuropathy)      Anemia in chronic renal disease      Avascular necrosis of bones of both hips (H)     s/p bilateral hip replacements     Basal cell carcinoma      Chronic hepatitis B (H)      Clostridium difficile colitis      Coronary artery disease involving native coronary artery of native heart without angina pectoris 6/17/2014    Coronary angiogram 6/17/14: Severe distal 3-vessel disease involving small vessels, not amenable to PCI or CABG.     CRP elevated      Depression      Diverticulosis      Dyslipidemia      FSGS (focal segmental glomerulosclerosis)      Gastric ulcer with hemorrhage 2/12/12     GERD (gastroesophageal reflux disease)      Glaucoma     OHTN     Hemorrhoids      Hypertension secondary to other renal disorders      Hypogonadism in male      Immunosuppressed status (H)      Kidney replaced by transplant     focal glomerulosclerosis      NSTEMI (non-ST elevated myocardial infarction) (H) 6/17/2014     JULIANE  (obstructive sleep apnea)     Doesn't use CPAP     Paracentral scotoma     LE     Secondary renal hyperparathyroidism (H)      Squamous cell carcinoma        CURRENT MEDICATIONS:  Current Outpatient Prescriptions   Medication Sig Dispense Refill     acetaminophen (TYLENOL) 325 MG tablet Take 1-2 tablets by mouth every 6 hours as needed.       albuterol (PROAIR HFA) 108 (90 Base) MCG/ACT Inhaler Inhale 2 puffs into the lungs every 6 hours as needed for shortness of breath / dyspnea or wheezing 1 Inhaler 2     albuterol (PROAIR HFA/PROVENTIL HFA/VENTOLIN HFA) 108 (90 BASE) MCG/ACT Inhaler Inhale 2 puffs into the lungs every 4 hours as needed for shortness of breath / dyspnea or wheezing 6.7 g 1     amLODIPine (NORVASC) 5 MG tablet Take 1 tablet (5 mg) by mouth daily 90 tablet 1     aspirin 81 MG tablet Take by mouth daily       atorvastatin (LIPITOR) 20 MG tablet Take 1 tablet (20 mg) by mouth daily You are rox to see your Cardiologist call [phone] to schedule. 90 tablet 1     calcium citrate-vitamin D (CITRACAL) 315-200 MG-UNIT TABS Take 1 tablet by mouth daily.       clopidogrel (PLAVIX) 75 MG tablet Take 1 tablet (75 mg) by mouth daily 30 tablet 2     entecavir (BARACLUDE) 0.5 MG tablet Take 1 tablet (0.5 mg) by mouth daily 90 tablet 3     FLUoxetine (PROZAC) 40 MG capsule Take 1 capsule (40 mg) by mouth every morning 90 capsule 0     fluticasone (FLONASE) 50 MCG/ACT spray Spray 1-2 sprays into both nostrils daily 3 Bottle 11     guaiFENesin-codeine (ROBITUSSIN AC) 100-10 MG/5ML SOLN solution Take 5-10 mLs by mouth every 4 hours as needed for cough 420 mL 0     ibuprofen (ADVIL,MOTRIN) 200 MG tablet Take 200 mg by mouth every 4 hours as needed for mild pain       isosorbide mononitrate (IMDUR) 30 MG 24 hr tablet Take 0.5 tablets (15 mg) by mouth daily Please make a follow up appointment for further refills. 15 tablet 0     latanoprost (XALATAN) 0.005 % ophthalmic solution Place 1 drop into both eyes At Bedtime  3 Bottle 3     montelukast (SINGULAIR) 10 MG tablet Take 1 tablet (10 mg) by mouth At Bedtime 30 tablet 3     montelukast (SINGULAIR) 10 MG tablet Take 1 tablet (10 mg) by mouth At Bedtime 30 tablet 1     Multiple Vitamins-Iron (MULTIVITAMIN/IRON PO) Take 1 tablet by mouth daily        mycophenolate (CELLCEPT - GENERIC EQUIVALENT) 250 MG capsule Take 3 capsules (750 mg) by mouth 2 times daily 180 capsule 11     Omega-3 Fatty Acids (OMEGA 3 PO) Take 1 capsule by mouth daily       omeprazole (PRILOSEC OTC) 20 MG tablet Take  by mouth daily. 30 tablet      predniSONE (DELTASONE) 5 MG tablet Take 1 tablet (5 mg) by mouth daily 90 tablet 3     BETA BLOCKER NOT PRESCRIBED, INTENTIONAL, Beta Blocker not prescribed intentionally due to Bradycardia < 50 bpm without beta blocker therapy  0     losartan (COZAAR) 50 MG tablet Take 0.5 tablets (25 mg) by mouth daily (Patient not taking: Reported on 5/8/2018) 90 tablet 3     predniSONE (DELTASONE) 20 MG tablet Take 1 tablet (20 mg) by mouth daily (Patient not taking: Reported on 5/8/2018) 5 tablet 0       PAST SURGICAL HISTORY:  Past Surgical History:   Procedure Laterality Date     APPENDECTOMY       CATARACT IOL, RT/LT  4/19/2000    RE     CATARACT IOL, RT/LT  6/1/2000    LE     COLECTOMY SUBTOTAL  1983    10 cm, diverticulitis     COLONOSCOPY  2/13/2012    Procedure:COLONOSCOPY; Surgeon:SLOAN GALLARDO; Location: GI     ESOPHAGOSCOPY, GASTROSCOPY, DUODENOSCOPY (EGD), COMBINED  2/13/2012    Procedure:COMBINED ESOPHAGOSCOPY, GASTROSCOPY, DUODENOSCOPY (EGD); Surgeon:SLOAN GALLARDO; Location: GI     EXTRACAPSULAR CATARACT EXTRATION WITH INTRAOCULAR LENS IMPLANT  4-20-10, 6-1-10    Rt, Lt     HERNIA REPAIR  1995    Lt inguinal     HIP SURGERY      1981, bilat MITZI, revised 2001, 2005     KIDNEY SURGERY  1978 and 1993    transplant     KNEE SURGERY  1983, 1987    bilat TKA     MOHS MICROGRAPHIC PROCEDURE       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, auxiliary  "spleen     TONSILLECTOMY         ALLERGIES:     Allergies   Allergen Reactions     Penicillins Shortness Of Breath and Hives     Contrast Dye Nausea and Vomiting     Keflex [Cephalexin Hcl] Other (See Comments)     Pt could not recall reaction     Tetracycline Other (See Comments)     Patient could not recall reaction     Sulfa Drugs Rash       FAMILY HISTORY:  Family History   Problem Relation Age of Onset     Cardiovascular Father      AI with valve repair     Hypertension Father      KIDNEY DISEASE Father      CANCER Paternal Grandmother      ovarian ca     CEREBROVASCULAR DISEASE Paternal Grandfather      CEREBROVASCULAR DISEASE Maternal Grandfather      KIDNEY DISEASE Paternal Aunt      Skin Cancer No family hx of      Glaucoma No family hx of      Macular Degeneration No family hx of      Amblyopia No family hx of    No family history of premature CAD or sudden death.    SOCIAL HISTORY:  Social History   Substance Use Topics     Smoking status: Former Smoker     Packs/day: 1.00     Years: 9.00     Quit date: 1/1/1988     Smokeless tobacco: Former User     Alcohol use 0.0 oz/week     0 Standard drinks or equivalent per week      Comment: rarely 1 drink per month       ROS:   A comprehensive 14 point review of systems is negative other than as mentioned in HPI.    Exam:  /71 (BP Location: Right arm, Patient Position: Chair, Cuff Size: Adult Regular)  Pulse 83  Ht 1.702 m (5' 7\")  Wt 78.3 kg (172 lb 11.2 oz)  SpO2 97%  BMI 27.05 kg/m2  GENERAL APPEARANCE: healthy, alert and no distress  EYES: no icterus, no xanthelasmas  ENT: normal palate, mucosa moist, no central cyanosis  NECK: no adenopathy, no asymmetry, masses, or scars, thyroid normal to palpation and no bruits, JVP not elevated  RESPIRATORY: +dry crackles bilateral bases, no use of accessory muscles, no retractions, respirations are unlabored, normal respiratory rate  CARDIOVASCULAR: regular rhythm, normal S1 with physiologic split S2, no S3 or " S4 and no murmur, click or rub, precordium quiet with normal PMI.  GI: soft, non tender, without hepatosplenomegaly, no masses palpable, bowel sounds normal, aorta not enlarged by palpation, no abdominal bruits  EXTREMITIES: peripheral pulses normal, no edema, no bruits  NEURO: alert and oriented to person/place/time, normal speech, gait and affect  VASC: Radial, dorsalis pedis and posterior tibialis pulses 2+ bilaterally.  SKIN: no ecchymoses, no rashes.  PSYCH: cooperative, affect appropriate.     Labs:  Reviewed.       Testing/Procedures:  I personally visualized and interpreted:  ECG 5/23/15: sinus shanna, VR 56, inferior Q-waves.     Coronary angiogram 6/17/2014:  Severe distal CAD involving small vessels of all 3 coronary arteries.     TTE 5/23/15: Normal biventricular size/function, LVEF=55-60%. No significant valvular disease.       Assessment and Plan:   1. Severe small vessel CAD, unrevascularizable:  Free of anginal symptoms. Continue present ASA, amlodipine, Imdur, statin. Not on beta blocker due to baseline bradycardia.    2. Enlarged PA on CTA: No clinical stigmata of pulmonary hypertension (and PFTs without disproportionate diffusion limitation)-- suspicions is relatively low, but TTE to exclude PH seems reasonable in light of CT findings.    3. Thoracic aortic aneurysm on CT chest (4.1 cm):  -gated MRA aorta      >50% of this 60-minute visit were spent with the patient on in-person counseling and discussion regarding the above-described diagnostic and therapeutic plan, the rationale for it, and answering all questions.     The patient states understanding and is agreeable with plan.     Balta Orantes MD  Cardiology    CC  [facility] NIELSEN

## 2018-05-08 NOTE — MR AVS SNAPSHOT
After Visit Summary   5/8/2018    Jerad Ross    MRN: [id number]           Patient Information     Date Of Birth          1960        Visit Information        Provider Department      5/8/2018 12:30 PM Balta Orantes MD [facility] CARDIOVASCULAR      Today's Diagnoses     Coronary artery disease involving native coronary artery of native heart without angina pectoris    -  1    Hypertension secondary to other renal disorders        Dyslipidemia        Abnormal CT scan, chest        NSTEMI (non-ST elevated myocardial infarction) (H)          Care Instructions    You were seen at the [facility] clinic today.  You saw Dr. Balta Orantes  Here are your Instructions:    1. Echocardiogram in 1-2 weeks.  2. Follow up in 1 year.       If you have questions after this visit:  Send a LicenseStream message or contact Sonya Avilez Nurse Care Coordinator  Office:  [phone] option #1, then #3 & ask for Sonya (nurse line)  Fax:  [phone]  After Hours:  [phone]  Appointments:  [phone] option #1, then option #1                   Follow-ups after your visit        Additional Services     Follow-Up with Cardiologist                 Your next 10 appointments already scheduled     May 10, 2018  4:00 PM CDT   Adult Med Follow UP with RONALDO Dempsey CNP   Psychiatry Clinic ([facility])    [address] [address] [phone]            May 14, 2018  2:45 PM CDT   RETURN GLAUCOMA with Viki Rizzo MD   Eye Clinic ([facility])    [address]  [facility] [address] [phone]            Jun 12, 2018  1:30 PM CDT   (Arrive by 1:00 PM)   Return Kidney Transplant with Nolan Lovett MD   [facility] ([facility])    [address] [place] [address]  [address]  [facility] [address]   [phone]            Jun 21, 2018  9:00 AM CDT   (Arrive by 8:45 AM)   New Patient Visit with Sisi Lockwood MD   [facility] ([facility])    [address]  [address]  [facility] [address]   [phone]            Jun 21, 2018 11:00 AM CDT   Ech Complete with [id number]   [facility] Echo ([facility])    [address]  [address]rd Floor  [facility] [address] [phone]           1. Please bring or wear a comfortable two-piece outfit. 2. You may eat, drink and take your normal medicines. 3. For any questions that cannot be answered, please contact the ordering physician              Future tests that were ordered for you today     Open Future Orders        Priority Expected Expires Ordered    Echo Complete Routine 5/15/2018 8/13/2018 5/8/2018    Follow-Up with Cardiologist Routine 5/8/2019 5/9/2019 5/8/2018            Who to contact     If you have questions or need follow up information about today's clinic visit or your schedule please contact [facility] directly at [phone].  Normal or non-critical lab and imaging results will be communicated to you by Emu Messengerhart, letter or phone within 4 business days after the clinic has received the results. If you do not hear from us within 7 days, please contact the clinic through Emu Messengerhart or phone. If you have a critical or abnormal lab result, we will notify you by phone as soon as possible.  Submit refill requests through Discourse or call your pharmacy and they will forward the refill request to us. Please allow 3 business days for your refill to be completed.          Additional Information About Your Visit        Emu Messengerhart Information     Discourse gives you secure access to your electronic health record. If you see a primary care provider, you can also send messages to your care team and make appointments. If you have questions,  "please call your primary care clinic.  If you do not have a primary care provider, please call [phone] and they will assist you.        Care EveryWhere ID     This is your Care EveryWhere ID. This could be used by other organizations to access your [facility] medical records  [id number]        Your Vitals Were     Pulse Height Pulse Oximetry BMI (Body Mass Index)          83 1.702 m (5' 7\") 97% 27.05 kg/m2         Blood Pressure from Last 3 Encounters:   05/08/18 101/71   05/01/18 102/70   04/10/18 97/65    Weight from Last 3 Encounters:   05/08/18 78.3 kg (172 lb 11.2 oz)   05/01/18 77.6 kg (171 lb)   04/10/18 79.8 kg (175 lb 14.4 oz)                 Where to get your medicines      These medications were sent to [facility] [address] [place] [address] AT [place] [address] [facility] [address]     Phone:  [phone]     clopidogrel 75 MG tablet          Primary Care Provider Office Phone # Fax #    Aston Perry [phone]       [address] [place] [address]        Equal Access to Services     CARLOS MENDOZA AH: Hadii aad ku hadasho Soomaali, waaxda luqadaha, qaybta kaalmada adeegyada, waxay idiin hayaan rose salamanca. So [facility] [phone].    ATENCIÓN: Si habla español, tiene a sanchez disposición servicios gratuitos de asistencia lingüística. Llame al [phone].    We comply with applicable federal civil rights laws and [place] laws. We do not discriminate on the basis of race, color, national origin, age, disability, sex, sexual orientation, or gender identity.            Thank you!     Thank you for choosing [facility]  for your care. Our goal is always to provide you with excellent care. Hearing back from our patients is one way we can continue to improve our services. Please take a few minutes to complete the written survey that you may receive in the mail after your visit " with us. Thank you!             Your Updated Medication List - Protect others around you: Learn how to safely use, store and throw away your medicines at www.disposemymeds.org.          This list is accurate as of 5/8/18 12:39 PM.  Always use your most recent med list.                   Brand Name Dispense Instructions for use Diagnosis    * albuterol 108 (90 Base) MCG/ACT Inhaler    PROAIR HFA/PROVENTIL HFA/VENTOLIN HFA    6.7 g    Inhale 2 puffs into the lungs every 4 hours as needed for shortness of breath / dyspnea or wheezing    Acute bronchospasm       * albuterol 108 (90 Base) MCG/ACT Inhaler    PROAIR HFA    1 Inhaler    Inhale 2 puffs into the lungs every 6 hours as needed for shortness of breath / dyspnea or wheezing    Cough, Wheezing       amLODIPine 5 MG tablet    NORVASC    90 tablet    Take 1 tablet (5 mg) by mouth daily    Acute chest pain       aspirin 81 MG tablet      Take by mouth daily        atorvastatin 20 MG tablet    LIPITOR    90 tablet    Take 1 tablet (20 mg) by mouth daily You are rox to see your Cardiologist call [phone] to schedule.    NSTEMI (non-ST elevated myocardial infarction) (H)       BETA BLOCKER NOT PRESCRIBED (INTENTIONAL)      Beta Blocker not prescribed intentionally due to Bradycardia < 50 bpm without beta blocker therapy    NSTEMI (non-ST elevated myocardial infarction) (H)       calcium citrate-vitamin D 315-200 MG-UNIT Tabs per tablet    CITRACAL     Take 1 tablet by mouth daily.        clopidogrel 75 MG tablet    PLAVIX    90 tablet    Take 1 tablet (75 mg) by mouth daily    NSTEMI (non-ST elevated myocardial infarction) (H), Coronary artery disease involving native coronary artery of native heart without angina pectoris       entecavir 0.5 MG tablet    BARACLUDE    90 tablet    Take 1 tablet (0.5 mg) by mouth daily    Chronic viral hepatitis B without delta agent and without coma (H), Chronic hepatitis B (H)       FLUoxetine 40 MG capsule    PROzac    90 capsule     Take 1 capsule (40 mg) by mouth every morning    Major depressive disorder, recurrent episode, moderate (H)       fluticasone 50 MCG/ACT spray    FLONASE    3 Bottle    Spray 1-2 sprays into both nostrils daily    Bronchitis with bronchospasm       guaiFENesin-codeine 100-10 MG/5ML Soln solution    ROBITUSSIN AC    420 mL    Take 5-10 mLs by mouth every 4 hours as needed for cough    Cough       ibuprofen 200 MG tablet    ADVIL/MOTRIN     Take 200 mg by mouth every 4 hours as needed for mild pain    Bronchitis, Essential hypertension, Coronary artery disease involving native heart without angina pectoris, unspecified vessel or lesion type       isosorbide mononitrate 30 MG 24 hr tablet    IMDUR    15 tablet    Take 0.5 tablets (15 mg) by mouth daily Please make a follow up appointment for further refills.    Acute chest pain       latanoprost 0.005 % ophthalmic solution    XALATAN    3 Bottle    Place 1 drop into both eyes At Bedtime    Borderline glaucoma with ocular hypertension, bilateral       losartan 50 MG tablet    COZAAR    90 tablet    Take 0.5 tablets (25 mg) by mouth daily    Hypertension, unspecified type       * montelukast 10 MG tablet    SINGULAIR    30 tablet    Take 1 tablet (10 mg) by mouth At Bedtime    Cough       * montelukast 10 MG tablet    SINGULAIR    30 tablet    Take 1 tablet (10 mg) by mouth At Bedtime    Bronchitis with bronchospasm       MULTIVITAMIN/IRON PO      Take 1 tablet by mouth daily        mycophenolate 250 MG capsule    GENERIC EQUIVALENT    180 capsule    Take 3 capsules (750 mg) by mouth 2 times daily    Kidney transplanted       OMEGA 3 PO      Take 1 capsule by mouth daily        omeprazole 20 MG tablet    priLOSEC OTC    30 tablet    Take  by mouth daily.    Chronic hepatitis B (H), HTN (hypertension), Depression, Unspecified essential hypertension       * predniSONE 20 MG tablet    DELTASONE    5 tablet    Take 1 tablet (20 mg) by mouth daily    Acute bronchospasm        * predniSONE 5 MG tablet    DELTASONE    90 tablet    Take 1 tablet (5 mg) by mouth daily    Kidney transplanted       TYLENOL 325 MG tablet   Generic drug:  acetaminophen      Take 1-2 tablets by mouth every 6 hours as needed.        * Notice:  This list has 6 medication(s) that are the same as other medications prescribed for you. Read the directions carefully, and ask your doctor or other care provider to review them with you.

## 2018-05-14 ENCOUNTER — TELEPHONE (OUTPATIENT)
Dept: NEPHROLOGY | Facility: CLINIC | Age: 58
End: 2018-05-14

## 2018-05-14 NOTE — TELEPHONE ENCOUNTER
Left voicemail for patient to call back (follow up from last transplant appointment).    Raeann Madrid RN

## 2018-05-15 NOTE — TELEPHONE ENCOUNTER
Spoke with patient. States he's feeling well today. Has had some lung issues, which he is wondering if they're linked to cellcept. He will discuss this at the appointment. Scheduled with pulmonology on 6/21.    Raeann Madrid RN

## 2018-05-16 ENCOUNTER — OFFICE VISIT (OUTPATIENT)
Dept: INTERNAL MEDICINE | Facility: CLINIC | Age: 58
End: 2018-05-16
Payer: MEDICARE

## 2018-05-16 VITALS
BODY MASS INDEX: 26.56 KG/M2 | OXYGEN SATURATION: 97 % | DIASTOLIC BLOOD PRESSURE: 80 MMHG | SYSTOLIC BLOOD PRESSURE: 119 MMHG | HEART RATE: 58 BPM | WEIGHT: 169.6 LBS

## 2018-05-16 DIAGNOSIS — J98.4 RESTRICTIVE LUNG DISEASE: ICD-10-CM

## 2018-05-16 DIAGNOSIS — R05.9 COUGH: Primary | ICD-10-CM

## 2018-05-16 DIAGNOSIS — J98.01 ACUTE BRONCHOSPASM: ICD-10-CM

## 2018-05-16 RX ORDER — PREDNISONE 50 MG/1
50 TABLET ORAL DAILY
Qty: 5 TABLET | Refills: 0 | Status: SHIPPED | OUTPATIENT
Start: 2018-05-16 | End: 2018-06-12

## 2018-05-16 RX ORDER — ALBUTEROL SULFATE 90 UG/1
2 AEROSOL, METERED RESPIRATORY (INHALATION) EVERY 4 HOURS PRN
Qty: 6.7 G | Refills: 1 | Status: SHIPPED | OUTPATIENT
Start: 2018-05-16 | End: 2018-06-12

## 2018-05-16 ASSESSMENT — PAIN SCALES - GENERAL: PAINLEVEL: NO PAIN (0)

## 2018-05-16 NOTE — MR AVS SNAPSHOT
After Visit Summary   5/16/2018    Jerad Ross    MRN: [id number]           Patient Information     Date Of Birth          1960        Visit Information        Provider Department      5/16/2018 11:40 AM Wilbert Buck MD [facility]        Today's Diagnoses     Cough    -  1    Restrictive lung disease        Acute bronchospasm          Care Instructions    [facility]: [phone]     [facility] Medication Refill Request Information:  * Please contact your pharmacy regarding ANY request for medication refills.  ** [facility] Prescription Fax = [phone]  * Please allow 3 business days for routine medication refills.  * Please allow 5 business days for controlled substance medication refills.     [facility] Test Result notification information:  *You will be notified with in 7-10 days of your appointment day regarding the results of your test.  If you are on MyChart you will be notified as soon as the provider has reviewed the results and signed off on them.            Follow-ups after your visit        Your next 10 appointments already scheduled     Jun 12, 2018 10:00 AM CDT   MR CHEST W/O & W CONTRAST ANGIOGRAM with UUMR4   [facility] [place], [facility] ([facility], [facility])    [address] [address] [phone]           Take your medicines as usual, unless your doctor tells you not to. Bring a list of your current medicines to your exam (including vitamins, minerals and over-the-counter drugs).  You may or may not receive intravenous (IV) contrast for this exam pending the discretion of the Radiologist.  You do not need to do anything special to prepare.  The MRI machine uses a strong magnet. Please wear clothes without metal (snaps, zippers). A sweatsuit works well, or we may give you a hospital gown.  Please remove any body piercings and hair extensions before you arrive. You will  also remove watches, jewelry, hairpins, wallets, dentures, partial dental plates and hearing aids. You may wear contact lenses, and you may be able to wear your rings. We have a safe place to keep your personal items, but it is safer to leave them at home.  **IMPORTANT** THE INSTRUCTIONS BELOW ARE ONLY FOR THOSE PATIENTS WHO HAVE BEEN PRESCRIBED SEDATION OR GENERAL ANESTHESIA DURING THEIR MRI PROCEDURE:  IF YOUR DOCTOR PRESCRIBED ORAL SEDATION (take medicine to help you relax during your exam):   You must get the medicine from your doctor (oral medication) before you arrive. Bring the medicine to the exam. Do not take it at home. You ll be told when to take it upon arriving for your exam.   Arrive one hour early. Bring someone who can take you home after the test. Your medicine will make you sleepy. After the exam, you may not drive, take a bus or take a taxi by yourself.  IF YOUR DOCTOR PRESCRIBED IV SEDATION:   Arrive one hour early. Bring someone who can take you home after the test. Your medicine will make you sleepy. After the exam, you may not drive, take a bus or take a taxi by yourself.   No eating 6 hours before your exam. You may have clear liquids up until 4 hours before your exam. (Clear liquids include water, clear tea, black coffee and fruit juice without pulp.)  IF YOUR DOCTOR PRESCRIBED ANESTHESIA (be asleep for your exam):   Arrive 1 1/2 hours early. Bring someone who can take you home after the test. You may not drive, take a bus or take a taxi by yourself.   No eating 8 hours before your exam. You may have clear liquids up until 4 hours before your exam. (Clear liquids include water, clear tea, black coffee and fruit juice without pulp.)   You will spend four to five hours in the recovery room.  Please call the Imaging Department at your exam site with any questions.            Jun 12, 2018  1:30 PM CDT   (Arrive by 1:00 PM)   Return Kidney Transplant with MD LAUREL Johnston [facility]  Nephrology ([facility])    [address]  [address]  [facility] [id number] [phone]            Jun 21, 2018  9:00 AM CDT   (Arrive by 8:45 AM)   New Patient Visit with Sisi Lockwood MD   [facility] ([facility])    [address]  [address]  [facility] [id number] [phone]            Jun 21, 2018 11:00 AM CDT   Ech Complete with UCECHCR4   [facility] Echo ([facility])    [address]  3rd Floor  [facility] [id number] [phone]           1. Please bring or wear a comfortable two-piece outfit. 2. You may eat, drink and take your normal medicines. 3. For any questions that cannot be answered, please contact the ordering physician            Jun 21, 2018  3:30 PM CDT   Adult Med Follow UP with RONALDO Dempsey CNP   Psychiatry Clinic ([facility])    [place] [id number]  [address] [id number] [phone]              Who to contact     Please call your clinic at [phone] to:    Ask questions about your health    Make or cancel appointments    Discuss your medicines    Learn about your test results    Speak to your doctor            Additional Information About Your Visit        [employer] Information     [employer] gives you secure access to your electronic health record. If you see a primary care provider, you can also send messages to your care team and make appointments. If you have questions, please call your primary care clinic.  If you do not have a primary care provider, please call [phone] and they will assist you.      [employer] is an electronic gateway that provides easy, online access to your medical records. With [employer], you can request a clinic appointment, read your test results, renew a prescription or communicate with your care team.     To access your existing account, please contact  your [facility] or call [phone] for assistance.        Care EveryWhere ID     This is your Care EveryWhere ID. This could be used by other organizations to access your [place] medical records  [id number]        Your Vitals Were     Pulse Pulse Oximetry BMI (Body Mass Index)             58 97% 26.56 kg/m2          Blood Pressure from Last 3 Encounters:   05/16/18 119/80   05/08/18 101/71   05/01/18 102/70    Weight from Last 3 Encounters:   05/16/18 76.9 kg (169 lb 9.6 oz)   05/08/18 78.3 kg (172 lb 11.2 oz)   05/01/18 77.6 kg (171 lb)              Today, you had the following     No orders found for display         Today's Medication Changes          These changes are accurate as of 5/16/18 12:37 PM.  If you have any questions, ask your nurse or doctor.               Start taking these medicines.        Dose/Directions    fluticasone-salmeterol 250-50 MCG/DOSE diskus inhaler   Commonly known as:  ADVAIR   Used for:  Cough   Started by:  Wilbert Buck MD        Dose:  1 puff   Inhale 1 puff into the lungs every 12 hours   Quantity:  60 Inhaler   Refills:  1         These medicines have changed or have updated prescriptions.        Dose/Directions    * predniSONE 20 MG tablet   Commonly known as:  DELTASONE   This may have changed:  Another medication with the same name was added. Make sure you understand how and when to take each.   Used for:  Acute bronchospasm   Changed by:  Wilbert Buck MD        Dose:  20 mg   Take 1 tablet (20 mg) by mouth daily   Quantity:  5 tablet   Refills:  0       * predniSONE 5 MG tablet   Commonly known as:  DELTASONE   This may have changed:  Another medication with the same name was added. Make sure you understand how and when to take each.   Used for:  Kidney transplanted   Changed by:  Wilbert Buck MD        Dose:  5 mg   Take 1 tablet (5 mg) by mouth daily   Quantity:  90 tablet   Refills:  3       * predniSONE 50 MG tablet    Commonly known as:  DELTASONE   This may have changed:  You were already taking a medication with the same name, and this prescription was added. Make sure you understand how and when to take each.   Used for:  Cough, Restrictive lung disease   Changed by:  Wilbert Buck MD        Dose:  50 mg   Take 1 tablet (50 mg) by mouth daily   Quantity:  5 tablet   Refills:  0       * Notice:  This list has 3 medication(s) that are the same as other medications prescribed for you. Read the directions carefully, and ask your doctor or other care provider to review them with you.         Where to get your medicines      These medications were sent to [place], [place] - [address]  [address], [facility] [address]    Hours:  TRANSPLANT PHONE NUMBER [phone] Phone:  [phone]     albuterol 108 (90 Base) MCG/ACT Inhaler    fluticasone-salmeterol 250-50 MCG/DOSE diskus inhaler    predniSONE 50 MG tablet                Primary Care Provider Office Phone # Fax #    Aston Perry [phone]       [address] [place] [address]   [facility] [address]        Equal Access to Services     CARLOS MENDOZA AH: Hadii yuli ferrara hadasho Soaristeo, waaxda luqadaha, qaybta kaalmada adeegyada, nish salamanca. So [facility] [phone].    ATENCIÓN: Si habla español, tiene a sanchez disposición servicios gratuitos de asistencia lingüística. Spencer al [phone].    We comply with applicable federal civil rights laws and [place] laws. We do not discriminate on the basis of race, color, national origin, age, disability, sex, sexual orientation, or gender identity.            Thank you!     Thank you for choosing [facility]  for your care. Our goal is always to provide you with excellent care. Hearing back from our patients is one way we can continue to improve our services. Please take a few minutes to complete the  written survey that you may receive in the mail after your visit with us. Thank you!             Your Updated Medication List - Protect others around you: Learn how to safely use, store and throw away your medicines at www.disposemymeds.org.          This list is accurate as of 5/16/18 12:37 PM.  Always use your most recent med list.                   Brand Name Dispense Instructions for use Diagnosis    * albuterol 108 (90 Base) MCG/ACT Inhaler    PROAIR HFA    1 Inhaler    Inhale 2 puffs into the lungs every 6 hours as needed for shortness of breath / dyspnea or wheezing    Cough, Wheezing       * albuterol 108 (90 Base) MCG/ACT Inhaler    PROAIR HFA/PROVENTIL HFA/VENTOLIN HFA    6.7 g    Inhale 2 puffs into the lungs every 4 hours as needed for shortness of breath / dyspnea or wheezing    Acute bronchospasm       amLODIPine 5 MG tablet    NORVASC    90 tablet    Take 1 tablet (5 mg) by mouth daily    Acute chest pain       aspirin 81 MG tablet      Take by mouth daily        atorvastatin 20 MG tablet    LIPITOR    90 tablet    Take 1 tablet (20 mg) by mouth daily You are rox to see your Cardiologist call [phone] to schedule.    NSTEMI (non-ST elevated myocardial infarction) (H)       BETA BLOCKER NOT PRESCRIBED (INTENTIONAL)      Beta Blocker not prescribed intentionally due to Bradycardia < 50 bpm without beta blocker therapy    NSTEMI (non-ST elevated myocardial infarction) (H)       calcium citrate-vitamin D 315-200 MG-UNIT Tabs per tablet    CITRACAL     Take 1 tablet by mouth daily.        clopidogrel 75 MG tablet    PLAVIX    90 tablet    Take 1 tablet (75 mg) by mouth daily    NSTEMI (non-ST elevated myocardial infarction) (H), Coronary artery disease involving native coronary artery of native heart without angina pectoris       entecavir 0.5 MG tablet    BARACLUDE    90 tablet    Take 1 tablet (0.5 mg) by mouth daily    Chronic viral hepatitis B without delta agent and without coma (H), Chronic  hepatitis B (H)       FLUoxetine 40 MG capsule    PROzac    90 capsule    Take 1 capsule (40 mg) by mouth every morning    Major depressive disorder, recurrent episode, moderate (H)       fluticasone 50 MCG/ACT spray    FLONASE    3 Bottle    Spray 1-2 sprays into both nostrils daily    Bronchitis with bronchospasm       fluticasone-salmeterol 250-50 MCG/DOSE diskus inhaler    ADVAIR    60 Inhaler    Inhale 1 puff into the lungs every 12 hours    Cough       guaiFENesin-codeine 100-10 MG/5ML Soln solution    ROBITUSSIN AC    420 mL    Take 5-10 mLs by mouth every 4 hours as needed for cough    Cough       ibuprofen 200 MG tablet    ADVIL/MOTRIN     Take 200 mg by mouth every 4 hours as needed for mild pain    Bronchitis, Essential hypertension, Coronary artery disease involving native heart without angina pectoris, unspecified vessel or lesion type       isosorbide mononitrate 30 MG 24 hr tablet    IMDUR    15 tablet    Take 0.5 tablets (15 mg) by mouth daily Please make a follow up appointment for further refills.    Acute chest pain       latanoprost 0.005 % ophthalmic solution    XALATAN    3 Bottle    Place 1 drop into both eyes At Bedtime    Borderline glaucoma with ocular hypertension, bilateral       losartan 50 MG tablet    COZAAR    90 tablet    Take 0.5 tablets (25 mg) by mouth daily    Hypertension, unspecified type       * montelukast 10 MG tablet    SINGULAIR    30 tablet    Take 1 tablet (10 mg) by mouth At Bedtime    Cough       * montelukast 10 MG tablet    SINGULAIR    30 tablet    Take 1 tablet (10 mg) by mouth At Bedtime    Bronchitis with bronchospasm       MULTIVITAMIN/IRON PO      Take 1 tablet by mouth daily        mycophenolate 250 MG capsule    GENERIC EQUIVALENT    180 capsule    Take 3 capsules (750 mg) by mouth 2 times daily    Kidney transplanted       OMEGA 3 PO      Take 1 capsule by mouth daily        omeprazole 20 MG tablet    priLOSEC OTC    30 tablet    Take  by mouth daily.     Chronic hepatitis B (H), HTN (hypertension), Depression, Unspecified essential hypertension       * predniSONE 20 MG tablet    DELTASONE    5 tablet    Take 1 tablet (20 mg) by mouth daily    Acute bronchospasm       * predniSONE 5 MG tablet    DELTASONE    90 tablet    Take 1 tablet (5 mg) by mouth daily    Kidney transplanted       * predniSONE 50 MG tablet    DELTASONE    5 tablet    Take 1 tablet (50 mg) by mouth daily    Cough, Restrictive lung disease       TYLENOL 325 MG tablet   Generic drug:  acetaminophen      Take 1-2 tablets by mouth every 6 hours as needed.        * Notice:  This list has 7 medication(s) that are the same as other medications prescribed for you. Read the directions carefully, and ask your doctor or other care provider to review them with you.

## 2018-05-16 NOTE — PATIENT INSTRUCTIONS
[facility]: [phone]     [facility] Medication Refill Request Information:  * Please contact your pharmacy regarding ANY request for medication refills.  ** [facility] Prescription Fax = [phone]  * Please allow 3 business days for routine medication refills.  * Please allow 5 business days for controlled substance medication refills.     [facility] Test Result notification information:  *You will be notified with in 7-10 days of your appointment day regarding the results of your test.  If you are on MyChart you will be notified as soon as the provider has reviewed the results and signed off on them.

## 2018-05-16 NOTE — NURSING NOTE
Chief Complaint   Patient presents with     Cough     Patient here for chronic cough. Patient requesting medications.       Jb Gutiérrez CMA at 12:00 PM on 5/16/2018.

## 2018-05-16 NOTE — PROGRESS NOTES
[facility]  Primary Care Center   Wilbert Buck MD  05/16/2018      Chief Complaint:   No chief complaint on file.       History of Present Illness:   Jerad Ross is a 58 year old male with a history of hypertension, NSTEMI, dyslipidemia, JULIANE, chronic hepatitis B, basal cell carcinoma, FSGS, and depression who presents for evaluation of chronic cough.    He was last evaluated by Dr. Perry on 5/1/18 for his nonproductive cough. He noted some improvement in the cough, and had been tolerating exercise and exertion better. Dr. Perry referred him to pulmonary to weigh in on his restrictive lung disease and CT findings concerning for IPF. He was symptomatically improving, so no treatment was provided at that time.    Respiratory: Today, he reports an intermittent cough for 9-10 months, recently worse over the past 5 days. He is coughing up clear mucus in the mornings. He can not lay down at night. The cough is better when he is moving and exercising. He has been treated with antibiotics over the course of this cough. It is hard to say if the Albuterol helps, and he has not been on Advair. He woke up a couple nights ago feeling feverish, and he took Tylenol, and he has been taking Tylenol prophylactically ever since. He took Prednisone 25mg one day, 20mg the next day, and then 25mg again.  He took 5mg Prednisone yesterday morning and 25mg Prednisone last night, which helped him get to sleep. Nothing else really seems to help. He has done PFT's and chest CT, and he understands that there is some scarring in his lungs.  He has an appt with Pulmonary on June 21st.  He felt better last week, and he did some cleaning with Windex in a closed space where he put the mask on afterwards, and he felt something was triggered for the next 4-5 days. He needs a refill of Albuterol. His wife has been sick also lately with pneumonia.      Kidney: He sees his nephrologist for his kidney transplant next week. They took him off  Immuran, and started Cellcept. He read Cellcept may cause lung issues.    Cardiovascular: Dr. Orantes set him up for a transthoracic echo for cardiomegaly.    Other concerns discussed:  1. He has an event coming up this weekend for his mother and father in law's 60th wedding anniversary locally.     Review of Systems:   Pertinent items are noted in HPI, remainder of complete ROS is negative.      Active Medications:     Current Outpatient Prescriptions:      acetaminophen (TYLENOL) 325 MG tablet, Take 1-2 tablets by mouth every 6 hours as needed., Disp: , Rfl:      albuterol (PROAIR HFA) 108 (90 Base) MCG/ACT Inhaler, Inhale 2 puffs into the lungs every 6 hours as needed for shortness of breath / dyspnea or wheezing, Disp: 1 Inhaler, Rfl: 2     albuterol (PROAIR HFA/PROVENTIL HFA/VENTOLIN HFA) 108 (90 BASE) MCG/ACT Inhaler, Inhale 2 puffs into the lungs every 4 hours as needed for shortness of breath / dyspnea or wheezing, Disp: 6.7 g, Rfl: 1     amLODIPine (NORVASC) 5 MG tablet, Take 1 tablet (5 mg) by mouth daily, Disp: 90 tablet, Rfl: 1     aspirin 81 MG tablet, Take by mouth daily, Disp: , Rfl:      atorvastatin (LIPITOR) 20 MG tablet, Take 1 tablet (20 mg) by mouth daily You are rox to see your Cardiologist call [phone] to schedule., Disp: 90 tablet, Rfl: 1     BETA BLOCKER NOT PRESCRIBED, INTENTIONAL,, Beta Blocker not prescribed intentionally due to Bradycardia < 50 bpm without beta blocker therapy, Disp: , Rfl: 0     calcium citrate-vitamin D (CITRACAL) 315-200 MG-UNIT TABS, Take 1 tablet by mouth daily., Disp: , Rfl:      clopidogrel (PLAVIX) 75 MG tablet, Take 1 tablet (75 mg) by mouth daily, Disp: 90 tablet, Rfl: 3     entecavir (BARACLUDE) 0.5 MG tablet, Take 1 tablet (0.5 mg) by mouth daily, Disp: 90 tablet, Rfl: 3     FLUoxetine (PROZAC) 40 MG capsule, Take 1 capsule (40 mg) by mouth every morning, Disp: 90 capsule, Rfl: 0     fluticasone (FLONASE) 50 MCG/ACT spray, Spray 1-2 sprays into both  nostrils daily, Disp: 3 Bottle, Rfl: 11     guaiFENesin-codeine (ROBITUSSIN AC) 100-10 MG/5ML SOLN solution, Take 5-10 mLs by mouth every 4 hours as needed for cough, Disp: 420 mL, Rfl: 0     ibuprofen (ADVIL,MOTRIN) 200 MG tablet, Take 200 mg by mouth every 4 hours as needed for mild pain, Disp: , Rfl:      isosorbide mononitrate (IMDUR) 30 MG 24 hr tablet, Take 0.5 tablets (15 mg) by mouth daily Please make a follow up appointment for further refills., Disp: 15 tablet, Rfl: 0     latanoprost (XALATAN) 0.005 % ophthalmic solution, Place 1 drop into both eyes At Bedtime, Disp: 3 Bottle, Rfl: 3     losartan (COZAAR) 50 MG tablet, Take 0.5 tablets (25 mg) by mouth daily (Patient not taking: Reported on 5/8/2018), Disp: 90 tablet, Rfl: 3     montelukast (SINGULAIR) 10 MG tablet, Take 1 tablet (10 mg) by mouth At Bedtime, Disp: 30 tablet, Rfl: 3     montelukast (SINGULAIR) 10 MG tablet, Take 1 tablet (10 mg) by mouth At Bedtime, Disp: 30 tablet, Rfl: 1     Multiple Vitamins-Iron (MULTIVITAMIN/IRON PO), Take 1 tablet by mouth daily , Disp: , Rfl:      mycophenolate (CELLCEPT - GENERIC EQUIVALENT) 250 MG capsule, Take 3 capsules (750 mg) by mouth 2 times daily, Disp: 180 capsule, Rfl: 11     Omega-3 Fatty Acids (OMEGA 3 PO), Take 1 capsule by mouth daily, Disp: , Rfl:      omeprazole (PRILOSEC OTC) 20 MG tablet, Take  by mouth daily., Disp: 30 tablet, Rfl:      predniSONE (DELTASONE) 20 MG tablet, Take 1 tablet (20 mg) by mouth daily (Patient not taking: Reported on 5/8/2018), Disp: 5 tablet, Rfl: 0     predniSONE (DELTASONE) 5 MG tablet, Take 1 tablet (5 mg) by mouth daily, Disp: 90 tablet, Rfl: 3      Allergies:   Penicillins; Contrast dye; Keflex [cephalexin hcl]; Tetracycline; and Sulfa drugs      Past Medical History:  AION (acute ischemic optic neuropathy)  Anemia in chronic renal disease  Avascular necrosis of bones of both hips  Basal cell carcinoma  Chronic hepatitis B  Clostridium difficile colitis  Coronary  artery disease involving native coronary artery of native heart without angina pectoris  Depression  Diverticulosis  Dyslipidemia  FSGS (focal segmental glomerulosclerosis)  Gastric ulcer with hemorrhage  GERD (gastroesophageal reflux disease)  Glaucoma  Hemorrhoids  Hypertension secondary to other renal disorders  Hypogonadism in male  NSTEMI (non-ST elevated myocardial infarction)   JULIANE (obstructive sleep apnea)  Paracentral scotoma  Secondary renal hyperparathyroidism  Squamous cell carcinoma  Kidney replaced by transplant  Inflamed seborrheic keratosis  Intertrigo     Past Surgical History:  Appendectomy  Cataract RE 4/19/2000 LE 6/1/2000  Colectomy subtotal, 10 cm, diverticulitis 1983  Extracapsular cataract extraction with intraocular lens implant, Rt, Lt 4/20/10, 6/1/10  Hernia repair, Lt inguinal 1995  Hip surgery, 1981, bilat MITZI, revised 2001, 2005  Kidney surgery, transplant 1978 and 1993  Knee surgery, bilat TKA 1983, 1987  Mohs micrographic procedure  Splenectomy, leukopenia, auxiliary spleen 1978  Tonsillectomy    Family History:    Father-Cardiovascular, Hypertension, Kidney Disease  Paternal Aunt- Kidney Disease  Paternal Grandmother-Ovarian Cancer   Paternal Grandfather- Cerebrovascular Disease  Maternal Grandfather- Cerebrovascular Disease  No family history of Skin Cancer, Glaucoma, Macular Degeneration, or Amblyopia.    Social History:   Former smoker of 1 pack/day for 9 years, quit 1/1/1988. Former user of smokeless tobacco. Has 1 drink per month.      Physical Exam:   /80  Pulse 58  Wt 76.9 kg (169 lb 9.6 oz)  SpO2 97%  BMI 26.56 kg/m2   GENERAL APPEARANCE: Healthy, alert and no distress with intermittent bouts of coughing  NECK: No adenopathy, no asymmetry, masses, or scars. Thyroid normal to palpation  RESP: Lungs without rales- expiratory wheeze, and prolonged expiratory phase, no rales  CV: Regular rates and rhythm, normal S1 and S2, no S3 or S4 and no murmur, click or  rub      Assessment and Plan:  Discussed with patient's PCP, Dr. Perry.    Cough   50mg Prednisone for 5 days, and Advair BID.  - predniSONE (DELTASONE) 50 MG tablet  Dispense: 5 tablet; Refill: 0  - fluticasone-salmeterol (ADVAIR) 250-50 MCG/DOSE diskus inhaler  Dispense: 60 Inhaler; Refill: 1  - we discussed the risks of steroids including further immunosuppression if he does have an opportunistic infection given findings on CT (ground glass opacities)    Restrictive lung disease   50mg Prednisone for 5 days, and Advair BID.  - predniSONE (DELTASONE) 50 MG tablet  Dispense: 5 tablet; Refill: 0;   - pt's course concerning for IPF  - cardiology evaluating for pulmonary HTN    Will call Pulmonary to see if we can move his appointment up.           Follow-up: Return if symptoms worsen or fail to improve.         Scribe Disclosure:   Lula GUEVARA, am serving as a scribe to document services personally performed by Wilbert Buck MD at this visit, based upon the provider's statements to me. All documentation has been reviewed by the aforementioned provider prior to being entered into the official medical record.     Portions of this medical record were completed by a scribe. UPON MY REVIEW AND AUTHENTICATION BY ELECTRONIC SIGNATURE, this confirms (a) I performed the applicable clinical services, and (b) the record is accurate.     Wilbert Buck MD, [facility]

## 2018-05-16 NOTE — Clinical Note
Roderick Gallo, I tried to have you paged but did not hear back as the  said you were on for consults.  I saw this patient of Dr. Trent in the [facility] today.  He has a distant hx of renal tx and 9-10 month hx of cough, PFT's with moderate restrictive lung disease and CT with ground glass opacities with enlarged PA.  He does not have an appt with Pulm until 6/21, hoping he can be seen sooner as his cough is worsening.  Can he be seen by a fellow or staff to accelerate work up?  Thanks Wilbert Buck MD, A

## 2018-05-31 DIAGNOSIS — R07.9 ACUTE CHEST PAIN: ICD-10-CM

## 2018-05-31 DIAGNOSIS — I21.4 NSTEMI (NON-ST ELEVATED MYOCARDIAL INFARCTION) (H): ICD-10-CM

## 2018-05-31 DIAGNOSIS — I25.10 CORONARY ARTERY DISEASE INVOLVING NATIVE CORONARY ARTERY OF NATIVE HEART WITHOUT ANGINA PECTORIS: ICD-10-CM

## 2018-05-31 RX ORDER — ISOSORBIDE MONONITRATE 30 MG/1
15 TABLET, EXTENDED RELEASE ORAL DAILY
Qty: 45 TABLET | Refills: 1 | Status: SHIPPED | OUTPATIENT
Start: 2018-05-31 | End: 2019-01-24

## 2018-05-31 RX ORDER — AMLODIPINE BESYLATE 5 MG/1
5 TABLET ORAL DAILY
Qty: 90 TABLET | Refills: 1 | Status: SHIPPED | OUTPATIENT
Start: 2018-05-31 | End: 2019-01-14

## 2018-05-31 RX ORDER — CLOPIDOGREL BISULFATE 75 MG/1
75 TABLET ORAL DAILY
Qty: 90 TABLET | Refills: 1 | Status: SHIPPED | OUTPATIENT
Start: 2018-05-31 | End: 2019-05-08

## 2018-05-31 RX ORDER — ATORVASTATIN CALCIUM 20 MG/1
20 TABLET, FILM COATED ORAL DAILY
Qty: 90 TABLET | Refills: 1 | Status: SHIPPED | OUTPATIENT
Start: 2018-05-31 | End: 2019-01-14

## 2018-06-06 NOTE — TELEPHONE ENCOUNTER
FUTURE VISIT INFORMATION      FUTURE VISIT INFORMATION:    Date: 6.7.18    Time: 11 Am    Location: [facility]  REFERRAL INFORMATION:    Referring provider:  Dr. Perry    Referring providers clinic:  [facility]    Reason for visit/diagnosis  Cough, restrictive lung disease    RECORDS REQUESTED FROM:       Clinic name Comments Records Status Imaging Status   PCC  Received PACS                                   RECORDS STATUS      RECORDS RECEIVED FROM: [facility]   DATE RECEIVED: 6.7.18   NOTES STATUS DETAILS   OFFICE NOTE from referring provider Internal Dr. Perry 5.1.18    OFFICE NOTE from other specialist Internal 5.16.18 Dr. Buck  4.25.18 Dr. Ponce placed referral  4.1.18 Dr. Ponce  10.6.17 Dr. Dickson (cough)   DISCHARGE SUMMARY from hospital N/A    DISCHARGE REPORT from the ER N/A    OPERATIVE REPORT N/A    MEDICATION LIST Internal    IMAGING  (NEED IMAGES AND REPORTS)     CT SCAN Internal 4.17.18   CHEST XRAY (CXR) Internal 2.15.18   TESTS     PULMONARY FUNCTION TESTING (PFT) Internal 4.17.18  3.13.18   FLOW LOOP VOLUME (FVL) Internal    CYSTIC FIBROSIS     CF SPUTUM CULTURE N/A

## 2018-06-07 ENCOUNTER — CARE COORDINATION (OUTPATIENT)
Dept: PULMONOLOGY | Facility: CLINIC | Age: 58
End: 2018-06-07

## 2018-06-07 ENCOUNTER — RADIANT APPOINTMENT (OUTPATIENT)
Dept: CT IMAGING | Facility: CLINIC | Age: 58
End: 2018-06-07
Attending: INTERNAL MEDICINE
Payer: MEDICARE

## 2018-06-07 ENCOUNTER — OFFICE VISIT (OUTPATIENT)
Dept: PULMONOLOGY | Facility: CLINIC | Age: 58
End: 2018-06-07
Attending: INTERNAL MEDICINE
Payer: MEDICARE

## 2018-06-07 ENCOUNTER — PRE VISIT (OUTPATIENT)
Dept: PULMONOLOGY | Facility: CLINIC | Age: 58
End: 2018-06-07

## 2018-06-07 VITALS
SYSTOLIC BLOOD PRESSURE: 117 MMHG | OXYGEN SATURATION: 96 % | HEIGHT: 67 IN | RESPIRATION RATE: 18 BRPM | HEART RATE: 66 BPM | WEIGHT: 171 LBS | DIASTOLIC BLOOD PRESSURE: 76 MMHG | BODY MASS INDEX: 26.84 KG/M2

## 2018-06-07 DIAGNOSIS — D84.9 IMMUNOCOMPROMISED STATE (H): ICD-10-CM

## 2018-06-07 DIAGNOSIS — J45.20 MILD INTERMITTENT ASTHMA, UNSPECIFIED WHETHER COMPLICATED: ICD-10-CM

## 2018-06-07 DIAGNOSIS — R93.89 ABNORMAL CT SCAN, CHEST: Primary | ICD-10-CM

## 2018-06-07 DIAGNOSIS — R93.89 ABNORMAL CT SCAN, CHEST: ICD-10-CM

## 2018-06-07 DIAGNOSIS — R05.9 COUGH: ICD-10-CM

## 2018-06-07 DIAGNOSIS — G47.33 OSA (OBSTRUCTIVE SLEEP APNEA): ICD-10-CM

## 2018-06-07 DIAGNOSIS — K21.9 GASTROESOPHAGEAL REFLUX DISEASE, ESOPHAGITIS PRESENCE NOT SPECIFIED: ICD-10-CM

## 2018-06-07 PROCEDURE — G0463 HOSPITAL OUTPT CLINIC VISIT: HCPCS | Mod: ZF

## 2018-06-07 ASSESSMENT — PAIN SCALES - GENERAL: PAINLEVEL: NO PAIN (0)

## 2018-06-07 NOTE — Clinical Note
Can you please call him and let him know that CT has returned normal except for dilated pulmonary artery which we already know about from previous study.  The area in question concerning for possible infection has resolved.  Qvar rx sent to his pharmacy for asthma per our discussion in clinic.  Let me know if questions.  Thanks.

## 2018-06-07 NOTE — PROGRESS NOTES
Pulmonary Clinic New Patient Consult  Reason for Consult: restrictive lung dz, abnormal CT  History of Present Illness    Mr. Ross is a 58-year-old male with a past medical history of coronary artery disease, acid reflux, sleep apnea not on CPAP, and renal transplant maintained on CellCept and prednisone 5 mg daily who presents to pulmonary clinic today for further evaluation of abnormal pulmonary function testing and abnormal CT chest.  Review of records is notable for the following:    Pulmonary function testing April 17, 2018: Ratio 72%, FVC 63%, FEV1 58%, TLC 72%, DLCO 68%.  Study suggestive of a moderate restrictive ventilatory defect with mild impairment in gas exchange.  There is no response to inhaled bronchodilator therapy.  Testing was stable when compared to March 2018.    CT chest April 17, 2018: Scattered centrilobular groundglass opacity most prominent in the lingula and left lower lobe favored to represent atypical infection.  Increased diameter pulmonary artery at 3.2 cm possibly suggestive of pulmonary arterial hypertension.    Patient recounts a series of episodes of recurrent bronchitis type infections starting last fall.  These were characterized by an and ending cough which was not responsive to albuterol or Singulair as prescribed by his primary care provider.  He followed up with his PCP in earlier May for similar complaint and was prescribed a course of prednisone as well as Advair.  Mr. Ross feels the prednisone helped his cough but never started taking the Advair due to issues with cost.  The cough was worsened with episodes of laughing as well as swallowing and eating.  The cough was occasionally productive of clear phlegm.  For the last week he feels like he has returned to his respiratory baseline.  He denies any shortness of breath at rest or with exertion.  He has no functional limitations.  He denies any hemoptysis, fevers, chills.  He does endorse an approximately 20 pound weight  loss due to decreased appetite over the winter.    He has a history of acid reflux and takes Prilosec on an as-needed basis for this.  He eats dinner around 6 PM nightly and goes to bed around 1030.  He will frequently snack before bed and often also has a beer in the evening.  He denies any problems with seasonal allergies or hayfever but does say that postnasal drip is a problem for him off and on.  He has taken Flonase in the past but is not currently using it.  He has a long history of colds that seem to take a long time to go away but does not recall previous episodes of chronic bronchitis prior to this fall.  He denies any history of recurrent pneumonias.    He is a previous smoker of 1 pack per day ×10 years.  He quit 30 years ago.  He lives in [place], [place] in an apartment that was built around the 1970s.  He has never been exposed to asbestos that he knows of.  He is previously employed as a nursing administrator.  He denies any significant exposure to birds, pillows or comforter stuffed with down feathers, Jacuzzis uses on a regular basis, or recent travel outside the area.    Family history is notable for a brother and a mother with asthma.    Review of Systems:  10 of 14 systems reviewed and are negative unless otherwise stated in HPI.    Past Medical History:   Diagnosis Date     AION (acute ischemic optic neuropathy)      Anemia in chronic renal disease      Avascular necrosis of bones of both hips (H)     s/p bilateral hip replacements     Basal cell carcinoma      Chronic hepatitis B (H)      Clostridium difficile colitis      Coronary artery disease involving native coronary artery of native heart without angina pectoris 6/17/2014    Coronary angiogram 6/17/14: Severe distal 3-vessel disease involving small vessels, not amenable to PCI or CABG.     CRP elevated      Depression      Diverticulosis      Dyslipidemia      FSGS (focal segmental glomerulosclerosis)      Gastric ulcer with  hemorrhage 2/12/12     GERD (gastroesophageal reflux disease)      Glaucoma     OHTN     Hemorrhoids      Hypertension secondary to other renal disorders      Hypogonadism in male      Immunosuppressed status (H)      Kidney replaced by transplant     focal glomerulosclerosis      NSTEMI (non-ST elevated myocardial infarction) (H) 6/17/2014     JULIANE (obstructive sleep apnea)     Doesn't use CPAP     Paracentral scotoma     LE     Secondary renal hyperparathyroidism (H)      Squamous cell carcinoma        Past Surgical History:   Procedure Laterality Date     APPENDECTOMY       CATARACT IOL, RT/LT  4/19/2000    RE     CATARACT IOL, RT/LT  6/1/2000    LE     COLECTOMY SUBTOTAL  1983    10 cm, diverticulitis     COLONOSCOPY  2/13/2012    Procedure:COLONOSCOPY; Surgeon:SLOAN GALLARDO; Location:[facility]     ESOPHAGOSCOPY, GASTROSCOPY, DUODENOSCOPY (EGD), COMBINED  2/13/2012    Procedure:COMBINED ESOPHAGOSCOPY, GASTROSCOPY, DUODENOSCOPY (EGD); Surgeon:SLOAN GALLARDO; Location:[facility]     EXTRACAPSULAR CATARACT EXTRATION WITH INTRAOCULAR LENS IMPLANT  4-20-10, 6-1-10    Rt, Lt     HERNIA REPAIR  1995    Lt inguinal     HIP SURGERY      1981, bilat MITZI, revised 2001, 2005     KIDNEY SURGERY  1978 and 1993    transplant     KNEE SURGERY  1983, 1987    bilat TKA     MOHS MICROGRAPHIC PROCEDURE       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, auxiliary spleen     TONSILLECTOMY         Family History   Problem Relation Age of Onset     Cardiovascular Father      AI with valve repair     Hypertension Father      KIDNEY DISEASE Father      CANCER Paternal Grandmother      ovarian ca     CEREBROVASCULAR DISEASE Paternal Grandfather      CEREBROVASCULAR DISEASE Maternal Grandfather      KIDNEY DISEASE Paternal Aunt      Skin Cancer No family hx of      Glaucoma No family hx of      Macular Degeneration No family hx of      Amblyopia No family hx of        Social History     Social History     Marital status:       Spouse name: N/A     Number of children: 0     Years of education: N/A     Occupational History      Disabled      [employer]     Social History Main Topics     Smoking status: Former Smoker     Packs/day: 1.00     Years: 9.00     Quit date: 1/1/1988     Smokeless tobacco: Former User     Alcohol use 0.0 oz/week     0 Standard drinks or equivalent per week      Comment: rarely 1 drink per month     Drug use: No     Sexual activity: Not on file     Other Topics Concern     Special Diet No     Exercise Yes     walks     Social History Narrative    . Wife is also a kidney transplant recipient.     Works part-time         Allergies   Allergen Reactions     Penicillins Shortness Of Breath and Hives     Contrast Dye Nausea and Vomiting     Keflex [Cephalexin Hcl] Other (See Comments)     Pt could not recall reaction     Tetracycline Other (See Comments)     Patient could not recall reaction     Sulfa Drugs Rash         Current Outpatient Prescriptions:      acetaminophen (TYLENOL) 325 MG tablet, Take 1-2 tablets by mouth every 6 hours as needed., Disp: , Rfl:      albuterol (PROAIR HFA) 108 (90 Base) MCG/ACT Inhaler, Inhale 2 puffs into the lungs every 6 hours as needed for shortness of breath / dyspnea or wheezing, Disp: 1 Inhaler, Rfl: 2     albuterol (PROAIR HFA/PROVENTIL HFA/VENTOLIN HFA) 108 (90 Base) MCG/ACT Inhaler, Inhale 2 puffs into the lungs every 4 hours as needed for shortness of breath / dyspnea or wheezing, Disp: 6.7 g, Rfl: 1     amLODIPine (NORVASC) 5 MG tablet, Take 1 tablet (5 mg) by mouth daily, Disp: 90 tablet, Rfl: 1     aspirin 81 MG tablet, Take by mouth daily, Disp: , Rfl:      atorvastatin (LIPITOR) 20 MG tablet, Take 1 tablet (20 mg) by mouth daily, Disp: 90 tablet, Rfl: 1     BETA BLOCKER NOT PRESCRIBED, INTENTIONAL,, Beta Blocker not prescribed intentionally due to Bradycardia < 50 bpm without beta blocker therapy, Disp: , Rfl: 0     calcium citrate-vitamin D  (CITRACAL) 315-200 MG-UNIT TABS, Take 1 tablet by mouth daily., Disp: , Rfl:      clopidogrel (PLAVIX) 75 MG tablet, Take 1 tablet (75 mg) by mouth daily, Disp: 90 tablet, Rfl: 1     entecavir (BARACLUDE) 0.5 MG tablet, Take 1 tablet (0.5 mg) by mouth daily, Disp: 90 tablet, Rfl: 3     FLUoxetine (PROZAC) 40 MG capsule, Take 1 capsule (40 mg) by mouth every morning, Disp: 90 capsule, Rfl: 0     fluticasone (FLONASE) 50 MCG/ACT spray, Spray 1-2 sprays into both nostrils daily, Disp: 3 Bottle, Rfl: 11     fluticasone-salmeterol (ADVAIR) 250-50 MCG/DOSE diskus inhaler, Inhale 1 puff into the lungs every 12 hours, Disp: 60 Inhaler, Rfl: 1     guaiFENesin-codeine (ROBITUSSIN AC) 100-10 MG/5ML SOLN solution, Take 5-10 mLs by mouth every 4 hours as needed for cough, Disp: 420 mL, Rfl: 0     ibuprofen (ADVIL,MOTRIN) 200 MG tablet, Take 200 mg by mouth every 4 hours as needed for mild pain, Disp: , Rfl:      isosorbide mononitrate (IMDUR) 30 MG 24 hr tablet, Take 0.5 tablets (15 mg) by mouth daily, Disp: 45 tablet, Rfl: 1     latanoprost (XALATAN) 0.005 % ophthalmic solution, Place 1 drop into both eyes At Bedtime, Disp: 3 Bottle, Rfl: 3     montelukast (SINGULAIR) 10 MG tablet, Take 1 tablet (10 mg) by mouth At Bedtime, Disp: 30 tablet, Rfl: 1     montelukast (SINGULAIR) 10 MG tablet, Take 1 tablet (10 mg) by mouth At Bedtime, Disp: 30 tablet, Rfl: 3     Multiple Vitamins-Iron (MULTIVITAMIN/IRON PO), Take 1 tablet by mouth daily , Disp: , Rfl:      mycophenolate (CELLCEPT - GENERIC EQUIVALENT) 250 MG capsule, Take 3 capsules (750 mg) by mouth 2 times daily, Disp: 180 capsule, Rfl: 11     Omega-3 Fatty Acids (OMEGA 3 PO), Take 1 capsule by mouth daily, Disp: , Rfl:      omeprazole (PRILOSEC OTC) 20 MG tablet, Take  by mouth daily., Disp: 30 tablet, Rfl:      predniSONE (DELTASONE) 20 MG tablet, Take 1 tablet (20 mg) by mouth daily, Disp: 5 tablet, Rfl: 0     predniSONE (DELTASONE) 5 MG tablet, Take 1 tablet (5 mg) by mouth  "daily, Disp: 90 tablet, Rfl: 3     predniSONE (DELTASONE) 50 MG tablet, Take 1 tablet (50 mg) by mouth daily, Disp: 5 tablet, Rfl: 0     losartan (COZAAR) 50 MG tablet, Take 0.5 tablets (25 mg) by mouth daily (Patient not taking: Reported on 6/7/2018), Disp: 90 tablet, Rfl: 3      Physical Exam:  /76 (BP Location: Right arm, Patient Position: Sitting, Cuff Size: Adult Regular)  Pulse 66  Resp 18  Ht 1.702 m (5' 7\")  Wt 77.6 kg (171 lb)  SpO2 96%  BMI 26.78 kg/m2  GENERAL: Well developed, well nourished, alert, and in no apparent distress.  HEENT: Normocephalic, atraumatic. PERRL, EOMI. Oral mucosa is moist. No perioral cyanosis.  NECK: supple, no masses, no thyromegaly.  RESP:  Normal respiratory effort.  CTAB.  No rales, wheezes, rhonchi.  Normal to percussion.  No cyanosis or clubbing.  CV: Normal S1, S2, regular rhythm, normal rate. No murmur.  No LE edema.   ABDOMEN:  Soft, non-tender, non-distended.   SKIN: warm and dry. No rash.  NEURO: AAOx3.  Normal gait.  No focal neuro deficits.  PSYCH: mentation appears normal. and affect normal/bright    Results:  PFTs: As described above, Ratio 72%, FVC 63%, FEV1 58%, TLC 72%, DLCO 68%.  Study suggestive of a moderate restrictive ventilatory defect with mild impairment in gas exchange.  There is no response to inhaled bronchodilator therapy.  Testing was stable when compared to March 2018.  Methacholine challenge testing was ordered but not obtained 2/2 reduced FEV1.  Imaging (personally reviewed in clinic today):  CT Chest, also as described above, scattered ggo opacities most prominent in lingula and medial LLL.      Assessment and Plan:   Jerad Ross is a 58 year old male with a history of CAD and renal transplant on chronic immune suppression with MMF and Prednisone who presents to pulmonary clinic today for further evaluation of abnormal PFTs and abnormal CT.  The etiology of restriction on his PFTs is unclear to me.  There is no evidence of " parenchymal or pleural scarring seen on CT and there are no obvious chest wall deformities to account for this.  Fortunately, he denies any dyspnea and pulmonary function testing has been relatively stable.  Likely, we can continue to monitor this with serial PFTs.    In regards to the episodes of recurrent bronchitis and abnormal CT imaging, it is possible this is representative of mild asthma.  If the CT was obtained in the setting of an episode of bronchitis or a viral URI, it is likely the ggo seen on imaging are reflective of this.  Given immunocompromised status, opportunistic type infections would also be a consideration though.  At this time, I think the best course of action would be to repeat CT to look for progression vs resolution of the ground glass opacities.  If persistent, he should probably undergo bronchoscopy to rule out opportunistic infections as an etiology of his respiratory symptoms.  If CT is normal, then I think we should proceed with an empiric trial of a low dose ICS inhaler for asthma.  We additionally discussed other causes of chronic cough in adults including post nasal drip and acid reflux.  He was encouraged to be mindful of possible contribution from both of these things.  He was also encourage to employ lifestyle modifications aimed at reducing acid reflux including head of bed elevation, avoidance of trigger foods, and avoidance of eating or drinking anything within 2 hours of bed time.  The above findings and plan were discussed with Mr. Ross and he voiced understanding and agreement.  Questions and concerns were answered to his satisfaction.  He was provided with my contact information should new questions or concerns arise in the interim.  he should return to clinic in 6 months for follow up.  We will be in touch with him sooner pending the results of his CT chest.    Yaima Lockwood MD  Pulmonary and Critical Care Medicine    The above note was dictated using voice  recognition software and may include typographical errors. Please contact the author for any clarifications.  ADDENDUM:  CT chest results:  Impression:   1. Resolution of the previously seen left lung groundglass opacities.  No evidence of active infection in the chest.  2. Unchanged dilation of the main pulmonary artery suggestive of  pulmonary hypertension.  3. Unchanged splenosis    Rx provided for low dose ICS (Qvar) for suspected asthma.

## 2018-06-07 NOTE — LETTER
6/7/2018        RE: Jerad Ross  [address]  [facility] [address]     Dear Colleague,    Thank you for referring your patient, Jerad Ross, to the [facility] at [facility]. Please see a copy of my visit note below.    Pulmonary Clinic New Patient Consult  Reason for Consult: restrictive lung dz, abnormal CT  History of Present Illness    Mr. Ross is a 58-year-old male with a past medical history of coronary artery disease, acid reflux, sleep apnea not on CPAP, and renal transplant maintained on CellCept and prednisone 5 mg daily who presents to pulmonary clinic today for further evaluation of abnormal pulmonary function testing and abnormal CT chest.  Review of records is notable for the following:    Pulmonary function testing April 17, 2018: Ratio 72%, FVC 63%, FEV1 58%, TLC 72%, DLCO 68%.  Study suggestive of a moderate restrictive ventilatory defect with mild impairment in gas exchange.  There is no response to inhaled bronchodilator therapy.  Testing was stable when compared to March 2018.    CT chest April 17, 2018: Scattered centrilobular groundglass opacity most prominent in the lingula and left lower lobe favored to represent atypical infection.  Increased diameter pulmonary artery at 3.2 cm possibly suggestive of pulmonary arterial hypertension.    Patient recounts a series of episodes of recurrent bronchitis type infections starting last fall.  These were characterized by an and ending cough which was not responsive to albuterol or Singulair as prescribed by his primary care provider.  He followed up with his PCP in earlier May for similar complaint and was prescribed a course of prednisone as well as Advair.  Mr. Ross feels the prednisone helped his cough but never started taking the Advair due to issues with cost.  The cough was worsened with episodes of laughing as well as swallowing and eating.  The  cough was occasionally productive of clear phlegm.  For the last week he feels like he has returned to his respiratory baseline.  He denies any shortness of breath at rest or with exertion.  He has no functional limitations.  He denies any hemoptysis, fevers, chills.  He does endorse an approximately 20 pound weight loss due to decreased appetite over the winter.    He has a history of acid reflux and takes Prilosec on an as-needed basis for this.  He eats dinner around 6 PM nightly and goes to bed around 1030.  He will frequently snack before bed and often also has a beer in the evening.  He denies any problems with seasonal allergies or hayfever but does say that postnasal drip is a problem for him off and on.  He has taken Flonase in the past but is not currently using it.  He has a long history of colds that seem to take a long time to go away but does not recall previous episodes of chronic bronchitis prior to this fall.  He denies any history of recurrent pneumonias.    He is a previous smoker of 1 pack per day ×10 years.  He quit 30 years ago.  He lives in [place], [place] in an apartment that was built around the 1970s.  He has never been exposed to asbestos that he knows of.  He is previously employed as a nursing administrator.  He denies any significant exposure to birds, pillows or comforter stuffed with down feathers, Jacuzzis uses on a regular basis, or recent travel outside the area.    Family history is notable for a brother and a mother with asthma.    Review of Systems:  10 of 14 systems reviewed and are negative unless otherwise stated in HPI.    Past Medical History:   Diagnosis Date     AION (acute ischemic optic neuropathy)      Anemia in chronic renal disease      Avascular necrosis of bones of both hips (H)     s/p bilateral hip replacements     Basal cell carcinoma      Chronic hepatitis B (H)      Clostridium difficile colitis      Coronary artery disease involving native coronary  artery of native heart without angina pectoris 6/17/2014    Coronary angiogram 6/17/14: Severe distal 3-vessel disease involving small vessels, not amenable to PCI or CABG.     CRP elevated      Depression      Diverticulosis      Dyslipidemia      FSGS (focal segmental glomerulosclerosis)      Gastric ulcer with hemorrhage 2/12/12     GERD (gastroesophageal reflux disease)      Glaucoma     OHTN     Hemorrhoids      Hypertension secondary to other renal disorders      Hypogonadism in male      Immunosuppressed status (H)      Kidney replaced by transplant     focal glomerulosclerosis      NSTEMI (non-ST elevated myocardial infarction) (H) 6/17/2014     JULIANE (obstructive sleep apnea)     Doesn't use CPAP     Paracentral scotoma     LE     Secondary renal hyperparathyroidism (H)      Squamous cell carcinoma        Past Surgical History:   Procedure Laterality Date     APPENDECTOMY       CATARACT IOL, RT/LT  4/19/2000    RE     CATARACT IOL, RT/LT  6/1/2000    LE     COLECTOMY SUBTOTAL  1983    10 cm, diverticulitis     COLONOSCOPY  2/13/2012    Procedure:COLONOSCOPY; Surgeon:SLOAN GALLARDO; Location: GI     ESOPHAGOSCOPY, GASTROSCOPY, DUODENOSCOPY (EGD), COMBINED  2/13/2012    Procedure:COMBINED ESOPHAGOSCOPY, GASTROSCOPY, DUODENOSCOPY (EGD); Surgeon:SLOAN GALLARDO; Location:[facility]     EXTRACAPSULAR CATARACT EXTRATION WITH INTRAOCULAR LENS IMPLANT  4-20-10, 6-1-10    Rt, Lt     HERNIA REPAIR  1995    Lt inguinal     HIP SURGERY      1981, bilat MITZI, revised 2001, 2005     KIDNEY SURGERY  1978 and 1993    transplant     KNEE SURGERY  1983, 1987    bilat TKA     MOHS MICROGRAPHIC PROCEDURE       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, auxiliary spleen     TONSILLECTOMY         Family History   Problem Relation Age of Onset     Cardiovascular Father      AI with valve repair     Hypertension Father      KIDNEY DISEASE Father      CANCER Paternal Grandmother      ovarian ca     CEREBROVASCULAR  DISEASE Paternal Grandfather      CEREBROVASCULAR DISEASE Maternal Grandfather      KIDNEY DISEASE Paternal Aunt      Skin Cancer No family hx of      Glaucoma No family hx of      Macular Degeneration No family hx of      Amblyopia No family hx of        Social History     Social History     Marital status:      Spouse name: N/A     Number of children: 0     Years of education: N/A     Occupational History      Disabled      [employer]     Social History Main Topics     Smoking status: Former Smoker     Packs/day: 1.00     Years: 9.00     Quit date: 1/1/1988     Smokeless tobacco: Former User     Alcohol use 0.0 oz/week     0 Standard drinks or equivalent per week      Comment: rarely 1 drink per month     Drug use: No     Sexual activity: Not on file     Other Topics Concern     Special Diet No     Exercise Yes     walks     Social History Narrative    . Wife is also a kidney transplant recipient.     Works part-time         Allergies   Allergen Reactions     Penicillins Shortness Of Breath and Hives     Contrast Dye Nausea and Vomiting     Keflex [Cephalexin Hcl] Other (See Comments)     Pt could not recall reaction     Tetracycline Other (See Comments)     Patient could not recall reaction     Sulfa Drugs Rash         Current Outpatient Prescriptions:      acetaminophen (TYLENOL) 325 MG tablet, Take 1-2 tablets by mouth every 6 hours as needed., Disp: , Rfl:      albuterol (PROAIR HFA) 108 (90 Base) MCG/ACT Inhaler, Inhale 2 puffs into the lungs every 6 hours as needed for shortness of breath / dyspnea or wheezing, Disp: 1 Inhaler, Rfl: 2     albuterol (PROAIR HFA/PROVENTIL HFA/VENTOLIN HFA) 108 (90 Base) MCG/ACT Inhaler, Inhale 2 puffs into the lungs every 4 hours as needed for shortness of breath / dyspnea or wheezing, Disp: 6.7 g, Rfl: 1     amLODIPine (NORVASC) 5 MG tablet, Take 1 tablet (5 mg) by mouth daily, Disp: 90 tablet, Rfl: 1     aspirin 81 MG tablet, Take by mouth  daily, Disp: , Rfl:      atorvastatin (LIPITOR) 20 MG tablet, Take 1 tablet (20 mg) by mouth daily, Disp: 90 tablet, Rfl: 1     BETA BLOCKER NOT PRESCRIBED, INTENTIONAL,, Beta Blocker not prescribed intentionally due to Bradycardia < 50 bpm without beta blocker therapy, Disp: , Rfl: 0     calcium citrate-vitamin D (CITRACAL) 315-200 MG-UNIT TABS, Take 1 tablet by mouth daily., Disp: , Rfl:      clopidogrel (PLAVIX) 75 MG tablet, Take 1 tablet (75 mg) by mouth daily, Disp: 90 tablet, Rfl: 1     entecavir (BARACLUDE) 0.5 MG tablet, Take 1 tablet (0.5 mg) by mouth daily, Disp: 90 tablet, Rfl: 3     FLUoxetine (PROZAC) 40 MG capsule, Take 1 capsule (40 mg) by mouth every morning, Disp: 90 capsule, Rfl: 0     fluticasone (FLONASE) 50 MCG/ACT spray, Spray 1-2 sprays into both nostrils daily, Disp: 3 Bottle, Rfl: 11     fluticasone-salmeterol (ADVAIR) 250-50 MCG/DOSE diskus inhaler, Inhale 1 puff into the lungs every 12 hours, Disp: 60 Inhaler, Rfl: 1     guaiFENesin-codeine (ROBITUSSIN AC) 100-10 MG/5ML SOLN solution, Take 5-10 mLs by mouth every 4 hours as needed for cough, Disp: 420 mL, Rfl: 0     ibuprofen (ADVIL,MOTRIN) 200 MG tablet, Take 200 mg by mouth every 4 hours as needed for mild pain, Disp: , Rfl:      isosorbide mononitrate (IMDUR) 30 MG 24 hr tablet, Take 0.5 tablets (15 mg) by mouth daily, Disp: 45 tablet, Rfl: 1     latanoprost (XALATAN) 0.005 % ophthalmic solution, Place 1 drop into both eyes At Bedtime, Disp: 3 Bottle, Rfl: 3     montelukast (SINGULAIR) 10 MG tablet, Take 1 tablet (10 mg) by mouth At Bedtime, Disp: 30 tablet, Rfl: 1     montelukast (SINGULAIR) 10 MG tablet, Take 1 tablet (10 mg) by mouth At Bedtime, Disp: 30 tablet, Rfl: 3     Multiple Vitamins-Iron (MULTIVITAMIN/IRON PO), Take 1 tablet by mouth daily , Disp: , Rfl:      mycophenolate (CELLCEPT - GENERIC EQUIVALENT) 250 MG capsule, Take 3 capsules (750 mg) by mouth 2 times daily, Disp: 180 capsule, Rfl: 11     Omega-3 Fatty Acids (OMEGA  "3 PO), Take 1 capsule by mouth daily, Disp: , Rfl:      omeprazole (PRILOSEC OTC) 20 MG tablet, Take  by mouth daily., Disp: 30 tablet, Rfl:      predniSONE (DELTASONE) 20 MG tablet, Take 1 tablet (20 mg) by mouth daily, Disp: 5 tablet, Rfl: 0     predniSONE (DELTASONE) 5 MG tablet, Take 1 tablet (5 mg) by mouth daily, Disp: 90 tablet, Rfl: 3     predniSONE (DELTASONE) 50 MG tablet, Take 1 tablet (50 mg) by mouth daily, Disp: 5 tablet, Rfl: 0     losartan (COZAAR) 50 MG tablet, Take 0.5 tablets (25 mg) by mouth daily (Patient not taking: Reported on 6/7/2018), Disp: 90 tablet, Rfl: 3      Physical Exam:  /76 (BP Location: Right arm, Patient Position: Sitting, Cuff Size: Adult Regular)  Pulse 66  Resp 18  Ht 1.702 m (5' 7\")  Wt 77.6 kg (171 lb)  SpO2 96%  BMI 26.78 kg/m2  GENERAL: Well developed, well nourished, alert, and in no apparent distress.  HEENT: Normocephalic, atraumatic. PERRL, EOMI. Oral mucosa is moist. No perioral cyanosis.  NECK: supple, no masses, no thyromegaly.  RESP:  Normal respiratory effort.  CTAB.  No rales, wheezes, rhonchi.  Normal to percussion.  No cyanosis or clubbing.  CV: Normal S1, S2, regular rhythm, normal rate. No murmur.  No LE edema.   ABDOMEN:  Soft, non-tender, non-distended.   SKIN: warm and dry. No rash.  NEURO: AAOx3.  Normal gait.  No focal neuro deficits.  PSYCH: mentation appears normal. and affect normal/bright    Results:  PFTs: As described above, Ratio 72%, FVC 63%, FEV1 58%, TLC 72%, DLCO 68%.  Study suggestive of a moderate restrictive ventilatory defect with mild impairment in gas exchange.  There is no response to inhaled bronchodilator therapy.  Testing was stable when compared to March 2018.  Methacholine challenge testing was ordered but not obtained 2/2 reduced FEV1.  Imaging (personally reviewed in clinic today):  CT Chest, also as described above, scattered ggo opacities most prominent in lingula and medial LLL.      Assessment and Plan:   Jerad " Gael Ross is a 58 year old male with a history of CAD and renal transplant on chronic immune suppression with MMF and Prednisone who presents to pulmonary clinic today for further evaluation of abnormal PFTs and abnormal CT.  The etiology of restriction on his PFTs is unclear to me.  There is no evidence of parenchymal or pleural scarring seen on CT and there are no obvious chest wall deformities to account for this.  Fortunately, he denies any dyspnea and pulmonary function testing has been relatively stable.  Likely, we can continue to monitor this with serial PFTs.    In regards to the episodes of recurrent bronchitis and abnormal CT imaging, it is possible this is representative of mild asthma.  If the CT was obtained in the setting of an episode of bronchitis or a viral URI, it is likely the ggo seen on imaging are reflective of this.  Given immunocompromised status, opportunistic type infections would also be a consideration though.  At this time, I think the best course of action would be to repeat CT to look for progression vs resolution of the ground glass opacities.  If persistent, he should probably undergo bronchoscopy to rule out opportunistic infections as an etiology of his respiratory symptoms.  If CT is normal, then I think we should proceed with an empiric trial of a low dose ICS inhaler for asthma.  We additionally discussed other causes of chronic cough in adults including post nasal drip and acid reflux.  He was encouraged to be mindful of possible contribution from both of these things.  He was also encourage to employ lifestyle modifications aimed at reducing acid reflux including head of bed elevation, avoidance of trigger foods, and avoidance of eating or drinking anything within 2 hours of bed time.  The above findings and plan were discussed with Mr. Ross and he voiced understanding and agreement.  Questions and concerns were answered to his satisfaction.  He was provided with my  contact information should new questions or concerns arise in the interim.  he should return to clinic in 6 months for follow up.  We will be in touch with him sooner pending the results of his CT chest.    Yaima Lockwood MD  Pulmonary and Critical Care Medicine    The above note was dictated using voice recognition software and may include typographical errors. Please contact the author for any clarifications.  ADDENDUM:  CT chest results:  Impression:   1. Resolution of the previously seen left lung groundglass opacities.  No evidence of active infection in the chest.  2. Unchanged dilation of the main pulmonary artery suggestive of  pulmonary hypertension.  3. Unchanged splenosis    Rx provided for low dose ICS (Qvar) for suspected asthma.      Again, thank you for allowing me to participate in the care of your patient.      Sincerely,    Sisi Lockwood MD

## 2018-06-07 NOTE — NURSING NOTE
Chief Complaint   Patient presents with     Consult     Patient is here for restrictive lung disease, cough     SMA Juan Jose

## 2018-06-07 NOTE — MR AVS SNAPSHOT
After Visit Summary   6/7/2018    Jerad Ross    MRN: [id number]           Patient Information     Date Of Birth          1960        Visit Information        Provider Department      6/7/2018 11:00 AM Sisi Lockwood MD [facility]        Today's Diagnoses     Abnormal CT scan, chest    -  1    Cough        Immunocompromised state (H)           Follow-ups after your visit        Follow-up notes from your care team     Return in about 6 months (around 12/7/2018).      Your next 10 appointments already scheduled     Jun 07, 2018  8:00 PM CDT   CT CHEST W/O CONTRAST with UCCT1   [facility] [place] [facility] ([facility])    [address] [id number] [phone]           Please bring any scans or X-rays taken at other hospitals, if similar tests were done. Also bring a list of your medicines, including vitamins, minerals and over-the-counter drugs. It is safest to leave personal items at home.  Be sure to tell your doctor:   If you have any allergies.   If there s any chance you are pregnant.   If you are breastfeeding.  You do not need to do anything special to prepare for this exam.  Please wear loose clothing, such as a sweat suit or jogging clothes. Avoid snaps, zippers and other metal. We may ask you to undress and put on a hospital gown.            Jun 12, 2018 10:00 AM CDT   MR CHEST W/O & W CONTRAST ANGIOGRAM with UUMR4   [facility] [place] ([facility])    [address] [id number] [phone]           Take your medicines as usual, unless your doctor tells you not to. Bring a list of your current medicines to your exam (including vitamins, minerals and over-the-counter drugs).  You may or may not receive intravenous (IV) contrast for this exam pending the discretion of the Radiologist.  You do not need to do  anything special to prepare.  The MRI machine uses a strong magnet. Please wear clothes without metal (snaps, zippers). A sweatsuit works well, or we may give you a hospital gown.  Please remove any body piercings and hair extensions before you arrive. You will also remove watches, jewelry, hairpins, wallets, dentures, partial dental plates and hearing aids. You may wear contact lenses, and you may be able to wear your rings. We have a safe place to keep your personal items, but it is safer to leave them at home.  **IMPORTANT** THE INSTRUCTIONS BELOW ARE ONLY FOR THOSE PATIENTS WHO HAVE BEEN PRESCRIBED SEDATION OR GENERAL ANESTHESIA DURING THEIR MRI PROCEDURE:  IF YOUR DOCTOR PRESCRIBED ORAL SEDATION (take medicine to help you relax during your exam):   You must get the medicine from your doctor (oral medication) before you arrive. Bring the medicine to the exam. Do not take it at home. You ll be told when to take it upon arriving for your exam.   Arrive one hour early. Bring someone who can take you home after the test. Your medicine will make you sleepy. After the exam, you may not drive, take a bus or take a taxi by yourself.  IF YOUR DOCTOR PRESCRIBED IV SEDATION:   Arrive one hour early. Bring someone who can take you home after the test. Your medicine will make you sleepy. After the exam, you may not drive, take a bus or take a taxi by yourself.   No eating 6 hours before your exam. You may have clear liquids up until 4 hours before your exam. (Clear liquids include water, clear tea, black coffee and fruit juice without pulp.)  IF YOUR DOCTOR PRESCRIBED ANESTHESIA (be asleep for your exam):   Arrive 1 1/2 hours early. Bring someone who can take you home after the test. You may not drive, take a bus or take a taxi by yourself.   No eating 8 hours before your exam. You may have clear liquids up until 4 hours before your exam. (Clear liquids include water, clear tea, black coffee and fruit juice without pulp.)    You will spend four to five hours in the recovery room.  Please call the Imaging Department at your exam site with any questions.            Jun 12, 2018  1:30 PM CDT   (Arrive by 1:00 PM)   Return Kidney Transplant with Nolan Lovett MD   [facility] ([facility])    [address]  [address]  [facility] [id number] [phone]            Jun 21, 2018 11:00 AM CDT   Ech Complete with [id number]   [facility] ([facility])    [address]  3rd Floor  [facility] [id number] [phone]           1. Please bring or wear a comfortable two-piece outfit. 2. You may eat, drink and take your normal medicines. 3. For any questions that cannot be answered, please contact the ordering physician            Jun 21, 2018  3:30 PM CDT   Adult Med Follow UP with RONALDO Dempsey CNP   [facility] ([facility])    [place] [id number]  [address] [id number] [phone]            Dec 13, 2018 10:30 AM CST   (Arrive by 10:15 AM)   Return Visit with Sisi Lockwood MD   [facility] ([facility])    [address]  [address]  [facility] [address] [phone]              Future tests that were ordered for you today     Open Future Orders        Priority Expected Expires Ordered    CT Chest w/o contrast Routine  6/7/2019 6/7/2018            Who to contact     If you have questions or need follow up information about today's clinic visit or your schedule please contact [facility] directly at [phone].  Normal or non-critical lab and imaging results will be communicated to you by MyChart, letter or phone within 4 business days after the clinic has received the results. If you do not hear from us within 7 days, please contact the clinic through MyChart or phone. If you have a  "critical or abnormal lab result, we will notify you by phone as soon as possible.  Submit refill requests through [employer] or call your pharmacy and they will forward the refill request to us. Please allow 3 business days for your refill to be completed.          Additional Information About Your Visit        MyChart Information     [employer] gives you secure access to your electronic health record. If you see a primary care provider, you can also send messages to your care team and make appointments. If you have questions, please call your primary care clinic.  If you do not have a primary care provider, please call [phone] and they will assist you.        Care EveryWhere ID     This is your Care EveryWhere ID. This could be used by other organizations to access your [place] medical records  [id number]        Your Vitals Were     Pulse Respirations Height Pulse Oximetry BMI (Body Mass Index)       66 18 1.702 m (5' 7\") 96% 26.78 kg/m2        Blood Pressure from Last 3 Encounters:   06/07/18 117/76   05/16/18 119/80   05/08/18 101/71    Weight from Last 3 Encounters:   06/07/18 77.6 kg (171 lb)   05/16/18 76.9 kg (169 lb 9.6 oz)   05/08/18 78.3 kg (172 lb 11.2 oz)               Primary Care Provider Office Phone # Fax #    Aston Perry [phone]       [address] [place] [id number]        Equal Access to Services     CARLOS MENDOZA AH: Hadii yuli ku hadkelleyo Soaristeo, waaxda luqadaha, qaybta kaalmada lauri, nish salamanca. So [facility] [phone].    ATENCIÓN: Si habla español, tiene a sanchez disposición servicios gratuitos de asistencia lingüística. Spencer al [phone].    We comply with applicable federal civil rights laws and [place] laws. We do not discriminate on the basis of race, color, national origin, age, disability, sex, sexual orientation, or gender identity.            Thank you!     Thank you for choosing [facility]  for your care. Our goal is always to provide you with excellent care. Hearing back from our patients is one way we can continue to improve our services. Please take a few minutes to complete the written survey that you may receive in the mail after your visit with us. Thank you!             Your Updated Medication List - Protect others around you: Learn how to safely use, store and throw away your medicines at www.disposemymeds.org.          This list is accurate as of 6/7/18 11:19 AM.  Always use your most recent med list.                   Brand Name Dispense Instructions for use Diagnosis    * albuterol 108 (90 Base) MCG/ACT Inhaler    PROAIR HFA    1 Inhaler    Inhale 2 puffs into the lungs every 6 hours as needed for shortness of breath / dyspnea or wheezing    Cough, Wheezing       * albuterol 108 (90 Base) MCG/ACT Inhaler    PROAIR HFA/PROVENTIL HFA/VENTOLIN HFA    6.7 g    Inhale 2 puffs into the lungs every 4 hours as needed for shortness of breath / dyspnea or wheezing    Acute bronchospasm       amLODIPine 5 MG tablet    NORVASC    90 tablet    Take 1 tablet (5 mg) by mouth daily    Acute chest pain       aspirin 81 MG tablet      Take by mouth daily        atorvastatin 20 MG tablet    LIPITOR    90 tablet    Take 1 tablet (20 mg) by mouth daily    NSTEMI (non-ST elevated myocardial infarction) (H)       BETA BLOCKER NOT PRESCRIBED (INTENTIONAL)      Beta Blocker not prescribed intentionally due to Bradycardia < 50 bpm without beta blocker therapy    NSTEMI (non-ST elevated myocardial infarction) (H)       calcium citrate-vitamin D 315-200 MG-UNIT Tabs per tablet    CITRACAL     Take 1 tablet by mouth daily.        clopidogrel 75 MG tablet    PLAVIX    90 tablet    Take 1 tablet (75 mg) by mouth daily    NSTEMI (non-ST elevated myocardial infarction) (H), Coronary artery disease involving native coronary artery of native heart without angina pectoris       entecavir 0.5 MG tablet     BARACLUDE    90 tablet    Take 1 tablet (0.5 mg) by mouth daily    Chronic viral hepatitis B without delta agent and without coma (H), Chronic hepatitis B (H)       FLUoxetine 40 MG capsule    PROzac    90 capsule    Take 1 capsule (40 mg) by mouth every morning    Major depressive disorder, recurrent episode, moderate (H)       fluticasone 50 MCG/ACT spray    FLONASE    3 Bottle    Spray 1-2 sprays into both nostrils daily    Bronchitis with bronchospasm       fluticasone-salmeterol 250-50 MCG/DOSE diskus inhaler    ADVAIR    60 Inhaler    Inhale 1 puff into the lungs every 12 hours    Cough       guaiFENesin-codeine 100-10 MG/5ML Soln solution    ROBITUSSIN AC    420 mL    Take 5-10 mLs by mouth every 4 hours as needed for cough    Cough       ibuprofen 200 MG tablet    ADVIL/MOTRIN     Take 200 mg by mouth every 4 hours as needed for mild pain    Bronchitis, Essential hypertension, Coronary artery disease involving native heart without angina pectoris, unspecified vessel or lesion type       isosorbide mononitrate 30 MG 24 hr tablet    IMDUR    45 tablet    Take 0.5 tablets (15 mg) by mouth daily    Acute chest pain       latanoprost 0.005 % ophthalmic solution    XALATAN    3 Bottle    Place 1 drop into both eyes At Bedtime    Borderline glaucoma with ocular hypertension, bilateral       losartan 50 MG tablet    COZAAR    90 tablet    Take 0.5 tablets (25 mg) by mouth daily    Hypertension, unspecified type       * montelukast 10 MG tablet    SINGULAIR    30 tablet    Take 1 tablet (10 mg) by mouth At Bedtime    Cough       * montelukast 10 MG tablet    SINGULAIR    30 tablet    Take 1 tablet (10 mg) by mouth At Bedtime    Bronchitis with bronchospasm       MULTIVITAMIN/IRON PO      Take 1 tablet by mouth daily        mycophenolate 250 MG capsule    GENERIC EQUIVALENT    180 capsule    Take 3 capsules (750 mg) by mouth 2 times daily    Kidney transplanted       OMEGA 3 PO      Take 1 capsule by mouth daily         omeprazole 20 MG tablet    priLOSEC OTC    30 tablet    Take  by mouth daily.    Chronic hepatitis B (H), HTN (hypertension), Depression, Unspecified essential hypertension       * predniSONE 20 MG tablet    DELTASONE    5 tablet    Take 1 tablet (20 mg) by mouth daily    Acute bronchospasm       * predniSONE 5 MG tablet    DELTASONE    90 tablet    Take 1 tablet (5 mg) by mouth daily    Kidney transplanted       * predniSONE 50 MG tablet    DELTASONE    5 tablet    Take 1 tablet (50 mg) by mouth daily    Cough, Restrictive lung disease       TYLENOL 325 MG tablet   Generic drug:  acetaminophen      Take 1-2 tablets by mouth every 6 hours as needed.        * Notice:  This list has 7 medication(s) that are the same as other medications prescribed for you. Read the directions carefully, and ask your doctor or other care provider to review them with you.

## 2018-06-07 NOTE — PROGRESS NOTES
Left message for pt to return call to clinic to advise CT results per Dr. Lockwood: CT has returned normal except for dilated pulmonary artery which we already know about from previous study.  The area in question concerning for possible infection has resolved.  Qvar rx sent to pt's pharmacy for asthma per discussion in clinic.  Pt returned call and message relayed.  Pt has no further questions at this time.

## 2018-06-12 ENCOUNTER — OFFICE VISIT (OUTPATIENT)
Dept: NEPHROLOGY | Facility: CLINIC | Age: 58
End: 2018-06-12
Attending: INTERNAL MEDICINE
Payer: MEDICARE

## 2018-06-12 VITALS
WEIGHT: 168.6 LBS | HEART RATE: 69 BPM | OXYGEN SATURATION: 96 % | BODY MASS INDEX: 25.55 KG/M2 | TEMPERATURE: 98.5 F | SYSTOLIC BLOOD PRESSURE: 113 MMHG | HEIGHT: 68 IN | DIASTOLIC BLOOD PRESSURE: 77 MMHG

## 2018-06-12 DIAGNOSIS — Z48.298 AFTERCARE FOLLOWING ORGAN TRANSPLANT: ICD-10-CM

## 2018-06-12 DIAGNOSIS — Z94.0 KIDNEY REPLACED BY TRANSPLANT: Primary | ICD-10-CM

## 2018-06-12 DIAGNOSIS — D84.9 IMMUNOSUPPRESSED STATUS (H): ICD-10-CM

## 2018-06-12 PROCEDURE — G0463 HOSPITAL OUTPT CLINIC VISIT: HCPCS | Mod: ZF

## 2018-06-12 ASSESSMENT — PAIN SCALES - GENERAL: PAINLEVEL: NO PAIN (0)

## 2018-06-12 NOTE — NURSING NOTE
Chief Complaint   Patient presents with     RECHECK     Post Kidney TXP   Pt roomed, vitals, meds, and allergies reviewed with pt. Pt ready for provider.  Rolando Miller, CMA

## 2018-06-12 NOTE — PROGRESS NOTES
Assessment and Plan:   1. DDKT - baseline Cr ~ 0.8-1.1, which has remained stable.  Will make no changes in immunosuppression.  2. HTN - well controlled at target of less than 140/90.  No changes.  3. CAD - asymptomatic.  Recommend continue exercise.  4. Restriction on PFTs - patient is being followed by Pulmonary and felt this might be mild asthma.  5. Chronic cough - improved symptoms.  6. Anemia in chronic renal disease - stable Hgb.  Will follow.  7. Vitamin D deficiency - slightly low vitamin D level and recommend starting cholecalciferol 1000 iu daily.  8. Chronic hepatitis B - undetectable hepatitis B DNA PCR.  Patient to continue close follow up with Hepatology.  9. Skin cancer - no new skin lesions.  Recommend regular follow up with Dermatology.  10. Recommend return visit in 12 months.    Assessment and plan was discussed with patient and he voiced his understanding and agreement.    Reason for Visit:  Mr. Ross is here for transplant and immunosuppression management.    HPI:  Mr. Ross is a 58 year old male with ESKD from [facility] and is status post DDKT on 10/6/93.         Transplant Hx:       Tx: DDKT  Date: 10/6/93       Present Maintenance IS: Mycophenolate mofetil and Prednisone       Baseline Creatinine: 0.8-1.1    Mr. Ross reports feeling okay overall with some medical complaints.  At patient's last clinic visit he was changed from azathioprine to mycophenolate mofetil and did well with this.  He was then tapered off cyclosporine.  Patient reports doing well with the change and feels he has less warts on his face.  No new skin lesions.    Since patient's last clinic visit, he has had a chronic nonproductive cough since last fall.  It was initially felt the cough was due to bronchitis and he was treated for this on a couple of occasions, which seemed to improve the cough, but only temporarily.  About a month ago, patient continued to have a cough and was started on oral prednisone, which helped a  little, but the cough persisted.  No shortness of breath with exertion.  A chest CT showed scattered centrilobular groundglass opacity most prominent in the lingula and left lower lobe favored to represent atypical infection.  Increased diameter pulmonary artery at 3.2 cm possibly suggestive of pulmonary arterial hypertension.  He also had PFTs that showed some restrictive properties.  Patient was seen by Pulmonary and repeat chest CT had resolved ground glass opacities and it was felt he may just have mild asthma.  However, he is also being evaluated for pulmonary hypertension.    His energy level is good and remains pretty normal.  He is active and does get some exercise, mostly with walking.  Denies any chest pain or shortness of breath with exertion.  Appetite is good his weight is stable over the last few months after his lost about 10-15 lbs with his upper respiratory illness in the winter.  No nausea, vomiting or diarrhea.  No fever, sweats or chills.  No leg swelling.    Home BP: Not checked.      ROS:  A comprehensive review of systems was obtained and negative, except as noted in the HPI or PMH.    Active Medical Problems:  Patient Active Problem List   Diagnosis     BCC (basal cell carcinoma), trunk     JULIANE (obstructive sleep apnea)     Hypertension secondary to other renal disorders     Coronary artery disease involving native coronary artery of native heart without angina pectoris     NSTEMI (non-ST elevated myocardial infarction) (H)     Depression     Diverticulosis     Hemorrhoids     Kidney replaced by transplant     Basal cell carcinoma     Squamous cell carcinoma     Dyslipidemia     CRP elevated     Glaucoma     AION (acute ischemic optic neuropathy)     Paracentral scotoma     Hip pain     Inflamed seborrheic keratosis     Intertrigo     Chronic hepatitis B (H)     Immunosuppressed status (H)     Hypogonadism in male     GERD (gastroesophageal reflux disease)     Aftercare following organ  transplant     Personal Hx:   Social History     Social History     Marital status:      Spouse name: N/A     Number of children: 0     Years of education: N/A     Occupational History      Disabled      [employer]     Social History Main Topics     Smoking status: Former Smoker     Packs/day: 1.00     Years: 9.00     Quit date: 1/1/1988     Smokeless tobacco: Former User     Alcohol use 0.0 oz/week     0 Standard drinks or equivalent per week      Comment: rarely 1 drink per month     Drug use: No     Sexual activity: Not on file     Other Topics Concern     Special Diet No     Exercise Yes     walks     Social History Narrative    . Wife is also a kidney transplant recipient.     Works part-time     Allergies:  Allergies   Allergen Reactions     Penicillins Shortness Of Breath and Hives     Contrast Dye Nausea and Vomiting     Keflex [Cephalexin Hcl] Other (See Comments)     Pt could not recall reaction     Tetracycline Other (See Comments)     Patient could not recall reaction     Sulfa Drugs Rash     Medications:  Prior to Admission medications    Medication Sig Start Date End Date Taking? Authorizing Provider   NEORAL 25 MG PO CAPSULE Take 2 capsules (50 mg) by mouth 2 times daily 6/1/17  Yes Nolan Lovett MD   azaTHIOprine (IMURAN) 50 MG tablet Take 1 tablet (50 mg) by mouth daily 4/27/17  Yes Nolan Lovett MD   predniSONE (DELTASONE) 5 MG tablet Take 1 tablet (5 mg) by mouth daily 3/27/17  Yes Nolan Lovett MD   hydrocortisone 2.5 % ointment Apply topically 2 times daily 3/15/17  Yes Foster De Guzman MD   entecavir (BARACLUDE) 0.5 MG tablet Take 1 tablet (0.5 mg) by mouth daily 12/29/16  Yes Shannon Cruz PA-C   latanoprost (XALATAN) 0.005 % ophthalmic solution Place 1 drop into both eyes At Bedtime 11/14/16  Yes Viki Rizzo MD   losartan (COZAAR) 50 MG tablet Take 1 tablet (50 mg) by mouth daily 11/1/16  Yes Aston Perry  "MD Reji   clopidogrel (PLAVIX) 75 MG tablet Take 1 tablet (75 mg) by mouth daily 9/19/16  Yes Neeraj Atwood MD   isosorbide mononitrate (IMDUR) 30 MG 24 hr tablet Take 0.5 tablets (15 mg) by mouth daily 6/14/16  Yes Neeraj Atwood MD   amLODIPine (NORVASC) 5 MG tablet Take 1 tablet (5 mg) by mouth daily 6/14/16  Yes Neeraj Atwood MD   ibuprofen (ADVIL,MOTRIN) 200 MG tablet Take 200 mg by mouth every 4 hours as needed for mild pain   Yes Reported, Patient   atorvastatin (LIPITOR) 20 MG tablet Take 1 tablet (20 mg) by mouth daily You are rox to see your Cardiologist call [phone] to schedule. 12/1/15  Yes Leon Snow MD   FLUoxetine HCl (PROZAC PO) Take 20 mg by mouth   Yes Reported, Patient   BETA BLOCKER NOT PRESCRIBED, INTENTIONAL, Beta Blocker not prescribed intentionally due to Bradycardia < 50 bpm without beta blocker therapy 6/18/14  Yes Carolyn Espinoza PA-C   aspirin 81 MG tablet Take by mouth daily   Yes Reported, Patient   Omega-3 Fatty Acids (OMEGA 3 PO) Take 1 capsule by mouth daily   Yes Reported, Patient   omeprazole (PRILOSEC OTC) 20 MG tablet Take  by mouth daily. 6/11/13  Yes Aston Perry MD   calcium citrate-vitamin D (CITRACAL) 315-200 MG-UNIT TABS Take 1 tablet by mouth daily.   Yes Reported, Patient   Psyllium (METAMUCIL PO) Take 2 teaspoonful by mouth daily    Yes Reported, Patient   Multiple Vitamins-Iron (MULTIVITAMIN/IRON PO) Take 1 tablet by mouth daily    Yes Reported, Patient   acetaminophen (TYLENOL) 325 MG tablet Take 1-2 tablets by mouth every 6 hours as needed.   Yes Reported, Patient     Vitals:  /77  Pulse 69  Temp 98.5  F (36.9  C) (Oral)  Ht 1.727 m (5' 8\")  Wt 76.5 kg (168 lb 9.6 oz)  SpO2 96%  BMI 25.64 kg/m2    Exam:   GENERAL APPEARANCE: alert and no distress  HENT: mouth without ulcers or lesions  LYMPHATICS: no cervical or supraclavicular nodes  RESP: lungs clear to auscultation - no rales, rhonchi " or wheezes  CV: regular rhythm, normal rate, no rub, no murmur  EDEMA: no LE edema bilaterally  ABDOMEN: soft, nondistended, nontender, bowel sounds normal  MS: extremities normal - no gross deformities noted, no evidence of inflammation in joints, no muscle tenderness  SKIN: no rash, actinic keratoses  TX KIDNEY: normal    Results:  Recent Results (from the past 2688 hour(s))   INR    Collection Time: 03/13/18  9:34 AM   Result Value Ref Range    INR 0.95 0.86 - 1.14   CBC with platelets    Collection Time: 03/13/18  9:34 AM   Result Value Ref Range    WBC 8.5 4.0 - 11.0 10e9/L    RBC Count 4.56 4.4 - 5.9 10e12/L    Hemoglobin 12.8 (L) 13.3 - 17.7 g/dL    Hematocrit 41.8 40.0 - 53.0 %    MCV 92 78 - 100 fl    MCH 28.1 26.5 - 33.0 pg    MCHC 30.6 (L) 31.5 - 36.5 g/dL    RDW 16.5 (H) 10.0 - 15.0 %    Platelet Count 343 150 - 450 10e9/L   Hepatic panel    Collection Time: 03/13/18  9:34 AM   Result Value Ref Range    Bilirubin Direct 0.1 0.0 - 0.2 mg/dL    Bilirubin Total 0.5 0.2 - 1.3 mg/dL    Albumin 3.2 (L) 3.4 - 5.0 g/dL    Protein Total 6.8 6.8 - 8.8 g/dL    Alkaline Phosphatase 77 40 - 150 U/L    ALT 27 0 - 70 U/L    AST 24 0 - 45 U/L   Basic metabolic panel    Collection Time: 03/13/18  9:34 AM   Result Value Ref Range    Sodium 140 133 - 144 mmol/L    Potassium 4.0 3.4 - 5.3 mmol/L    Chloride 107 94 - 109 mmol/L    Carbon Dioxide 27 20 - 32 mmol/L    Anion Gap 6 3 - 14 mmol/L    Glucose 87 70 - 99 mg/dL    Urea Nitrogen 10 7 - 30 mg/dL    Creatinine 0.78 0.66 - 1.25 mg/dL    GFR Estimate >90 >60 mL/min/1.7m2    GFR Estimate If Black >90 >60 mL/min/1.7m2    Calcium 9.1 8.5 - 10.1 mg/dL   Hep B Virus DNA Quant Real Time PCR    Collection Time: 03/13/18  9:34 AM   Result Value Ref Range    HEP B Virus DNA Quant <20 (A) HBVND^HBV DNA Not Detected [IU]/mL    HEP B Virus DNA Quant Log <1.3 <1.3 [Log_IU]/mL   General PFT Lab (Please always keep checked)    Collection Time: 03/13/18 11:29 AM   Result Value Ref Range     FVC-Pred 4.46 L    FVC-Pre 2.81 L    FVC-%Pred-Pre 62 %    FEV1-Pre 2.32 L    FEV1-%Pred-Pre 66 %    FEV1FVC-Pred 76 %    FEV1FVC-Pre 83 %    FEFMax-Pred 9.04 L/sec    FEFMax-Pre 5.33 L/sec    FEFMax-%Pred-Pre 59 %    FEF2575-Pred 3.06 L/sec    FEF2575-Pre 2.34 L/sec    [id number]-%Pred-Pre 76 %    FEF2575-Post 2.95 L/sec    [id number]-%Pred-Post 96 %    ExpTime-Pre 8.14 sec    FIFMax-Pre 4.77 L/sec    FEV1FEV6-Pred 80 %    FEV1FEV6-Pre 82 %   Influenza A/B and RSV PCR - Nasal Swab, Clinic Collect    Collection Time: 04/10/18  2:18 PM   Result Value Ref Range    Specimen Description Nasal     Influenza A PCR Negative NEG^Negative    Influenza B PCR Negative NEG^Negative    Resp Syncytial Virus Negative NEG^Negative   General PFT Lab (Please always keep checked)    Collection Time: 04/17/18 10:09 AM   Result Value Ref Range    FVC-Pred 4.46 L    FVC-Pre 2.83 L    FVC-%Pred-Pre 63 %    FEV1-Pre 2.05 L    FEV1-%Pred-Pre 58 %    FEV1FVC-Pred 76 %    FEV1FVC-Pre 72 %    FEFMax-Pred 9.04 L/sec    FEFMax-Pre 5.27 L/sec    FEFMax-%Pred-Pre 58 %    FEF2575-Pred 3.06 L/sec    FEF2575-Pre 1.43 L/sec    UEF2152-%Pred-Pre 46 %    FEF2575-Post 1.82 L/sec    CQS2773-%Pred-Post 59 %    ExpTime-Pre 9.24 sec    FIFMax-Pre 4.77 L/sec    VC-Pred 4.73 L    VC-Pre 2.88 L    VC-%Pred-Pre 60 %    IC-Pred 3.55 L    IC-Pre 2.35 L    IC-%Pred-Pre 66 %    ERV-Pred 1.18 L    ERV-Pre 0.52 L    ERV-%Pred-Pre 44 %    FEV1FEV6-Pred 80 %    FEV1FEV6-Pre 74 %    FRCPleth-Pred 3.46 L    FRCPleth-Pre 2.49 L    FRCPleth-%Pred-Pre 72 %    RVPleth-Pred 2.27 L    RVPleth-Pre 1.97 L    RVPleth-%Pred-Pre 86 %    TLCPleth-Pred 6.72 L    TLCPleth-Pre 4.85 L    TLCPleth-%Pred-Pre 72 %    DLCOunc-Pred 28.45 ml/min/mmHg    DLCOunc-Pre 19.60 ml/min/mmHg    DLCOunc-%Pred-Pre 68 %    VA-Pre 3.94 L    VA-%Pred-Pre 61 %    FEV1SVC-Pred 74 %    FEV1SVC-Pre 71 %

## 2018-06-12 NOTE — LETTER
6/12/2018      RE: Jerad Ross  [address]  [facility] [address]       Assessment and Plan:   1. DDKT - baseline Cr ~ 0.8-1.1, which has remained stable.  Will make no changes in immunosuppression.  2. HTN - well controlled at target of less than 140/90.  No changes.  3. CAD - asymptomatic.  Recommend continue exercise.  4. Restriction on PFTs - patient is being followed by Pulmonary and felt this might be mild asthma.  5. Chronic cough - improved symptoms.  6. Anemia in chronic renal disease - stable Hgb.  Will follow.  7. Vitamin D deficiency - slightly low vitamin D level and recommend starting cholecalciferol 1000 iu daily.  8. Chronic hepatitis B - undetectable hepatitis B DNA PCR.  Patient to continue close follow up with Hepatology.  9. Skin cancer - no new skin lesions.  Recommend regular follow up with Dermatology.  10. Recommend return visit in 12 months.    Assessment and plan was discussed with patient and he voiced his understanding and agreement.    Reason for Visit:  Mr. Ross is here for transplant and immunosuppression management.    HPI:  Mr. Ross is a 58 year old male with ESKD from [facility] and is status post DDKT on 10/6/93.         Transplant Hx:       Tx: DDKT  Date: 10/6/93       Present Maintenance IS: Mycophenolate mofetil and Prednisone       Baseline Creatinine: 0.8-1.1    Mr. Ross reports feeling okay overall with some medical complaints.  At patient's last clinic visit he was changed from azathioprine to mycophenolate mofetil and did well with this.  He was then tapered off cyclosporine.  Patient reports doing well with the change and feels he has less warts on his face.  No new skin lesions.    Since patient's last clinic visit, he has had a chronic nonproductive cough since last fall.  It was initially felt the cough was due to bronchitis and he was treated for this on a couple of occasions, which seemed to improve the cough, but only temporarily.  About a month  ago, patient continued to have a cough and was started on oral prednisone, which helped a little, but the cough persisted.  No shortness of breath with exertion.  A chest CT showed scattered centrilobular groundglass opacity most prominent in the lingula and left lower lobe favored to represent atypical infection.  Increased diameter pulmonary artery at 3.2 cm possibly suggestive of pulmonary arterial hypertension.  He also had PFTs that showed some restrictive properties.  Patient was seen by Pulmonary and repeat chest CT had resolved ground glass opacities and it was felt he may just have mild asthma.  However, he is also being evaluated for pulmonary hypertension.    His energy level is good and remains pretty normal.  He is active and does get some exercise, mostly with walking.  Denies any chest pain or shortness of breath with exertion.  Appetite is good his weight is stable over the last few months after his lost about 10-15 lbs with his upper respiratory illness in the winter.  No nausea, vomiting or diarrhea.  No fever, sweats or chills.  No leg swelling.    Home BP: Not checked.      ROS:  A comprehensive review of systems was obtained and negative, except as noted in the HPI or PMH.    Active Medical Problems:  Patient Active Problem List   Diagnosis     BCC (basal cell carcinoma), trunk     JULIANE (obstructive sleep apnea)     Hypertension secondary to other renal disorders     Coronary artery disease involving native coronary artery of native heart without angina pectoris     NSTEMI (non-ST elevated myocardial infarction) (H)     Depression     Diverticulosis     Hemorrhoids     Kidney replaced by transplant     Basal cell carcinoma     Squamous cell carcinoma     Dyslipidemia     CRP elevated     Glaucoma     AION (acute ischemic optic neuropathy)     Paracentral scotoma     Hip pain     Inflamed seborrheic keratosis     Intertrigo     Chronic hepatitis B (H)     Immunosuppressed status (H)      Hypogonadism in male     GERD (gastroesophageal reflux disease)     Aftercare following organ transplant     Personal Hx:   Social History     Social History     Marital status:      Spouse name: N/A     Number of children: 0     Years of education: N/A     Occupational History      Disabled      [employer]     Social History Main Topics     Smoking status: Former Smoker     Packs/day: 1.00     Years: 9.00     Quit date: 1/1/1988     Smokeless tobacco: Former User     Alcohol use 0.0 oz/week     0 Standard drinks or equivalent per week      Comment: rarely 1 drink per month     Drug use: No     Sexual activity: Not on file     Other Topics Concern     Special Diet No     Exercise Yes     walks     Social History Narrative    . Wife is also a kidney transplant recipient.     Works part-time     Allergies:  Allergies   Allergen Reactions     Penicillins Shortness Of Breath and Hives     Contrast Dye Nausea and Vomiting     Keflex [Cephalexin Hcl] Other (See Comments)     Pt could not recall reaction     Tetracycline Other (See Comments)     Patient could not recall reaction     Sulfa Drugs Rash     Medications:  Prior to Admission medications    Medication Sig Start Date End Date Taking? Authorizing Provider   NEORAL 25 MG PO CAPSULE Take 2 capsules (50 mg) by mouth 2 times daily 6/1/17  Yes Nolan Lovett MD   azaTHIOprine (IMURAN) 50 MG tablet Take 1 tablet (50 mg) by mouth daily 4/27/17  Yes Nolan Lovett MD   predniSONE (DELTASONE) 5 MG tablet Take 1 tablet (5 mg) by mouth daily 3/27/17  Yes Nolan Lovett MD   hydrocortisone 2.5 % ointment Apply topically 2 times daily 3/15/17  Yes Foster De Guzman MD   entecavir (BARACLUDE) 0.5 MG tablet Take 1 tablet (0.5 mg) by mouth daily 12/29/16  Yes Shannon Cruz PA-C   latanoprost (XALATAN) 0.005 % ophthalmic solution Place 1 drop into both eyes At Bedtime 11/14/16  Yes Viki Rizzo MD  "  losartan (COZAAR) 50 MG tablet Take 1 tablet (50 mg) by mouth daily 11/1/16  Yes Aston Perry MD   clopidogrel (PLAVIX) 75 MG tablet Take 1 tablet (75 mg) by mouth daily 9/19/16  Yes Neeraj Atwood MD   isosorbide mononitrate (IMDUR) 30 MG 24 hr tablet Take 0.5 tablets (15 mg) by mouth daily 6/14/16  Yes Neeraj Atwood MD   amLODIPine (NORVASC) 5 MG tablet Take 1 tablet (5 mg) by mouth daily 6/14/16  Yes Neeraj Atwood MD   ibuprofen (ADVIL,MOTRIN) 200 MG tablet Take 200 mg by mouth every 4 hours as needed for mild pain   Yes Reported, Patient   atorvastatin (LIPITOR) 20 MG tablet Take 1 tablet (20 mg) by mouth daily You are rox to see your Cardiologist call [phone] to schedule. 12/1/15  Yes Leon Snow MD   FLUoxetine HCl (PROZAC PO) Take 20 mg by mouth   Yes Reported, Patient   BETA BLOCKER NOT PRESCRIBED, INTENTIONAL, Beta Blocker not prescribed intentionally due to Bradycardia < 50 bpm without beta blocker therapy 6/18/14  Yes Carolyn Espinoza PA-C   aspirin 81 MG tablet Take by mouth daily   Yes Reported, Patient   Omega-3 Fatty Acids (OMEGA 3 PO) Take 1 capsule by mouth daily   Yes Reported, Patient   omeprazole (PRILOSEC OTC) 20 MG tablet Take  by mouth daily. 6/11/13  Yes Aston Perry MD   calcium citrate-vitamin D (CITRACAL) 315-200 MG-UNIT TABS Take 1 tablet by mouth daily.   Yes Reported, Patient   Psyllium (METAMUCIL PO) Take 2 teaspoonful by mouth daily    Yes Reported, Patient   Multiple Vitamins-Iron (MULTIVITAMIN/IRON PO) Take 1 tablet by mouth daily    Yes Reported, Patient   acetaminophen (TYLENOL) 325 MG tablet Take 1-2 tablets by mouth every 6 hours as needed.   Yes Reported, Patient     Vitals:  /77  Pulse 69  Temp 98.5  F (36.9  C) (Oral)  Ht 1.727 m (5' 8\")  Wt 76.5 kg (168 lb 9.6 oz)  SpO2 96%  BMI 25.64 kg/m2    Exam:   GENERAL APPEARANCE: alert and no distress  HENT: mouth without ulcers or " lesions  LYMPHATICS: no cervical or supraclavicular nodes  RESP: lungs clear to auscultation - no rales, rhonchi or wheezes  CV: regular rhythm, normal rate, no rub, no murmur  EDEMA: no LE edema bilaterally  ABDOMEN: soft, nondistended, nontender, bowel sounds normal  MS: extremities normal - no gross deformities noted, no evidence of inflammation in joints, no muscle tenderness  SKIN: no rash, actinic keratoses  TX KIDNEY: normal    Results:  Recent Results (from the past 2688 hour(s))   INR    Collection Time: 03/13/18  9:34 AM   Result Value Ref Range    INR 0.95 0.86 - 1.14   CBC with platelets    Collection Time: 03/13/18  9:34 AM   Result Value Ref Range    WBC 8.5 4.0 - 11.0 10e9/L    RBC Count 4.56 4.4 - 5.9 10e12/L    Hemoglobin 12.8 (L) 13.3 - 17.7 g/dL    Hematocrit 41.8 40.0 - 53.0 %    MCV 92 78 - 100 fl    MCH 28.1 26.5 - 33.0 pg    MCHC 30.6 (L) 31.5 - 36.5 g/dL    RDW 16.5 (H) 10.0 - 15.0 %    Platelet Count 343 150 - 450 10e9/L   Hepatic panel    Collection Time: 03/13/18  9:34 AM   Result Value Ref Range    Bilirubin Direct 0.1 0.0 - 0.2 mg/dL    Bilirubin Total 0.5 0.2 - 1.3 mg/dL    Albumin 3.2 (L) 3.4 - 5.0 g/dL    Protein Total 6.8 6.8 - 8.8 g/dL    Alkaline Phosphatase 77 40 - 150 U/L    ALT 27 0 - 70 U/L    AST 24 0 - 45 U/L   Basic metabolic panel    Collection Time: 03/13/18  9:34 AM   Result Value Ref Range    Sodium 140 133 - 144 mmol/L    Potassium 4.0 3.4 - 5.3 mmol/L    Chloride 107 94 - 109 mmol/L    Carbon Dioxide 27 20 - 32 mmol/L    Anion Gap 6 3 - 14 mmol/L    Glucose 87 70 - 99 mg/dL    Urea Nitrogen 10 7 - 30 mg/dL    Creatinine 0.78 0.66 - 1.25 mg/dL    GFR Estimate >90 >60 mL/min/1.7m2    GFR Estimate If Black >90 >60 mL/min/1.7m2    Calcium 9.1 8.5 - 10.1 mg/dL   Hep B Virus DNA Quant Real Time PCR    Collection Time: 03/13/18  9:34 AM   Result Value Ref Range    HEP B Virus DNA Quant <20 (A) HBVND^HBV DNA Not Detected [IU]/mL    HEP B Virus DNA Quant Log <1.3 <1.3  [Log_IU]/mL   General PFT Lab (Please always keep checked)    Collection Time: 03/13/18 11:29 AM   Result Value Ref Range    FVC-Pred 4.46 L    FVC-Pre 2.81 L    FVC-%Pred-Pre 62 %    FEV1-Pre 2.32 L    FEV1-%Pred-Pre 66 %    FEV1FVC-Pred 76 %    FEV1FVC-Pre 83 %    FEFMax-Pred 9.04 L/sec    FEFMax-Pre 5.33 L/sec    FEFMax-%Pred-Pre 59 %    FEF2575-Pred 3.06 L/sec    FEF2575-Pre 2.34 L/sec    FSV1251-%Pred-Pre 76 %    FEF2575-Post 2.95 L/sec    GLC9526-%Pred-Post 96 %    ExpTime-Pre 8.14 sec    FIFMax-Pre 4.77 L/sec    FEV1FEV6-Pred 80 %    FEV1FEV6-Pre 82 %   Influenza A/B and RSV PCR - Nasal Swab, Clinic Collect    Collection Time: 04/10/18  2:18 PM   Result Value Ref Range    Specimen Description Nasal     Influenza A PCR Negative NEG^Negative    Influenza B PCR Negative NEG^Negative    Resp Syncytial Virus Negative NEG^Negative   General PFT Lab (Please always keep checked)    Collection Time: 04/17/18 10:09 AM   Result Value Ref Range    FVC-Pred 4.46 L    FVC-Pre 2.83 L    FVC-%Pred-Pre 63 %    FEV1-Pre 2.05 L    FEV1-%Pred-Pre 58 %    FEV1FVC-Pred 76 %    FEV1FVC-Pre 72 %    FEFMax-Pred 9.04 L/sec    FEFMax-Pre 5.27 L/sec    FEFMax-%Pred-Pre 58 %    FEF2575-Pred 3.06 L/sec    FEF2575-Pre 1.43 L/sec    [id number]-%Pred-Pre 46 %    FEF2575-Post 1.82 L/sec    [id number]-%Pred-Post 59 %    ExpTime-Pre 9.24 sec    FIFMax-Pre 4.77 L/sec    VC-Pred 4.73 L    VC-Pre 2.88 L    VC-%Pred-Pre 60 %    IC-Pred 3.55 L    IC-Pre 2.35 L    IC-%Pred-Pre 66 %    ERV-Pred 1.18 L    ERV-Pre 0.52 L    ERV-%Pred-Pre 44 %    FEV1FEV6-Pred 80 %    FEV1FEV6-Pre 74 %    FRCPleth-Pred 3.46 L    FRCPleth-Pre 2.49 L    FRCPleth-%Pred-Pre 72 %    RVPleth-Pred 2.27 L    RVPleth-Pre 1.97 L    RVPleth-%Pred-Pre 86 %    TLCPleth-Pred 6.72 L    TLCPleth-Pre 4.85 L    TLCPleth-%Pred-Pre 72 %    DLCOunc-Pred 28.45 ml/min/mmHg    DLCOunc-Pre 19.60 ml/min/mmHg    DLCOunc-%Pred-Pre 68 %    VA-Pre 3.94 L    VA-%Pred-Pre 61 %    FEV1SVC-Pred 74 %     FEV1SVC-Pre 71 %       Nolan Lovett MD

## 2018-06-12 NOTE — MR AVS SNAPSHOT
After Visit Summary   6/12/2018    Jerad Ross    MRN: [id number]           Patient Information     Date Of Birth          1960        Visit Information        Provider Department      6/12/2018 1:30 PM Nolan Lovett MD [facility] Nephrology        Today's Diagnoses     Kidney replaced by transplant    -  1    Aftercare following organ transplant        Immunosuppressed status (H)          Care Instructions    Start cholecalciferol (vitamin D) 1000 iu daily.          Follow-ups after your visit        Follow-up notes from your care team     Return in about 1 year (around 6/12/2019).      Your next 10 appointments already scheduled     Garett 15, 2018  3:00 PM CDT   MR CHEST W/O & W CONTRAST ANGIOGRAM with UUMR4   [facility] [place] ([facility], [facility])    [address] [address] [phone]           Take your medicines as usual, unless your doctor tells you not to. Bring a list of your current medicines to your exam (including vitamins, minerals and over-the-counter drugs).  You may or may not receive intravenous (IV) contrast for this exam pending the discretion of the Radiologist.  You do not need to do anything special to prepare.  The MRI machine uses a strong magnet. Please wear clothes without metal (snaps, zippers). A sweatsuit works well, or we may give you a hospital gown.  Please remove any body piercings and hair extensions before you arrive. You will also remove watches, jewelry, hairpins, wallets, dentures, partial dental plates and hearing aids. You may wear contact lenses, and you may be able to wear your rings. We have a safe place to keep your personal items, but it is safer to leave them at home.  **IMPORTANT** THE INSTRUCTIONS BELOW ARE ONLY FOR THOSE PATIENTS WHO HAVE BEEN PRESCRIBED SEDATION OR GENERAL ANESTHESIA DURING THEIR MRI PROCEDURE:  IF YOUR DOCTOR PRESCRIBED ORAL SEDATION (take  medicine to help you relax during your exam):   You must get the medicine from your doctor (oral medication) before you arrive. Bring the medicine to the exam. Do not take it at home. You ll be told when to take it upon arriving for your exam.   Arrive one hour early. Bring someone who can take you home after the test. Your medicine will make you sleepy. After the exam, you may not drive, take a bus or take a taxi by yourself.  IF YOUR DOCTOR PRESCRIBED IV SEDATION:   Arrive one hour early. Bring someone who can take you home after the test. Your medicine will make you sleepy. After the exam, you may not drive, take a bus or take a taxi by yourself.   No eating 6 hours before your exam. You may have clear liquids up until 4 hours before your exam. (Clear liquids include water, clear tea, black coffee and fruit juice without pulp.)  IF YOUR DOCTOR PRESCRIBED ANESTHESIA (be asleep for your exam):   Arrive 1 1/2 hours early. Bring someone who can take you home after the test. You may not drive, take a bus or take a taxi by yourself.   No eating 8 hours before your exam. You may have clear liquids up until 4 hours before your exam. (Clear liquids include water, clear tea, black coffee and fruit juice without pulp.)   You will spend four to five hours in the recovery room.  Please call the Imaging Department at your exam site with any questions.            Jun 21, 2018 11:00 AM CDT   Ech Complete with [id number]   [facility] ([facility])    [address] [place] [address]  3rd [facility] [address] [phone]           1. Please bring or wear a comfortable two-piece outfit. 2. You may eat, drink and take your normal medicines. 3. For any questions that cannot be answered, please contact the ordering physician            Jun 21, 2018  3:30 PM CDT   Adult Med Follow UP with RONALDO Dempsey CNP   Psychiatry Clinic ([facility])    [place] [id number]  [facility] [address]   [phone]            Dec 11, 2018  1:55 PM CST   (Arrive by 1:25 PM)   Return Kidney Transplant with Uc Kidney/Pancreas Recipient   [facility] Nephrology ([facility])    [address]  [address]  [facility] [address] [phone]            Dec 13, 2018 10:30 AM CST   (Arrive by 10:15 AM)   Return Visit with Sisi Lockwood MD   [facility] ([facility])    [address]  [address]  [facility] [address] [phone]              Who to contact     If you have questions or need follow up information about today's clinic visit or your schedule please contact [facility] NEPHROLOGY directly at [phone].  Normal or non-critical lab and imaging results will be communicated to you by Qooplhart, letter or phone within 4 business days after the clinic has received the results. If you do not hear from us within 7 days, please contact the clinic through Statzupt or phone. If you have a critical or abnormal lab result, we will notify you by phone as soon as possible.  Submit refill requests through AdRoll or call your pharmacy and they will forward the refill request to us. Please allow 3 business days for your refill to be completed.          Additional Information About Your Visit        MyChart Information     AdRoll gives you secure access to your electronic health record. If you see a primary care provider, you can also send messages to your care team and make appointments. If you have questions, please call your primary care clinic.  If you do not have a primary care provider, please call [phone] and they will assist you.        Care EveryWhere ID     This is your Care EveryWhere ID. This could be used by other organizations to access your [place] medical records  [id number]        Your Vitals Were     Pulse Temperature Height Pulse Oximetry BMI (Body Mass Index)  "      69 98.5  F (36.9  C) (Oral) 1.727 m (5' 8\") 96% 25.64 kg/m2        Blood Pressure from Last 3 Encounters:   06/12/18 113/77   06/07/18 117/76   05/16/18 119/80    Weight from Last 3 Encounters:   06/12/18 76.5 kg (168 lb 9.6 oz)   06/07/18 77.6 kg (171 lb)   05/16/18 76.9 kg (169 lb 9.6 oz)              Today, you had the following     No orders found for display       Primary Care Provider Office Phone # Fax #    Aston Perry [phone]       [address] [place] [address]        Equal Access to Services     CARLOS MENDOZA : Rosa harris Soaristeo, waaxda luqadaha, qaybta kaalmada adeegyada, nish gates . So [facility] [phone].    ATENCIÓN: Si habla español, tiene a sanchez disposición servicios gratuitos de asistencia lingüística. Ll[facility] [phone].    We comply with applicable federal civil rights laws and [place] laws. We do not discriminate on the basis of race, color, national origin, age, disability, sex, sexual orientation, or gender identity.            Thank you!     Thank you for choosing [facility] NEPHROLOGY  for your care. Our goal is always to provide you with excellent care. Hearing back from our patients is one way we can continue to improve our services. Please take a few minutes to complete the written survey that you may receive in the mail after your visit with us. Thank you!             Your Updated Medication List - Protect others around you: Learn how to safely use, store and throw away your medicines at www.disposemymeds.org.          This list is accurate as of 6/12/18  2:04 PM.  Always use your most recent med list.                   Brand Name Dispense Instructions for use Diagnosis    albuterol 108 (90 Base) MCG/ACT Inhaler    PROAIR HFA    1 Inhaler    Inhale 2 puffs into the lungs every 6 hours as needed for shortness of breath / dyspnea or wheezing    Cough, Wheezing       amLODIPine 5 MG tablet    " NORVASC    90 tablet    Take 1 tablet (5 mg) by mouth daily    Acute chest pain       aspirin 81 MG tablet      Take by mouth daily        atorvastatin 20 MG tablet    LIPITOR    90 tablet    Take 1 tablet (20 mg) by mouth daily    NSTEMI (non-ST elevated myocardial infarction) (H)       beclomethasone 40 MCG/ACT Inhaler    QVAR    3 Inhaler    Inhale 2 puffs into the lungs 2 times daily    Mild intermittent asthma, unspecified whether complicated       BETA BLOCKER NOT PRESCRIBED (INTENTIONAL)      Beta Blocker not prescribed intentionally due to Bradycardia < 50 bpm without beta blocker therapy    NSTEMI (non-ST elevated myocardial infarction) (H)       calcium citrate-vitamin D 315-200 MG-UNIT Tabs per tablet    CITRACAL     Take 1 tablet by mouth daily.        clopidogrel 75 MG tablet    PLAVIX    90 tablet    Take 1 tablet (75 mg) by mouth daily    NSTEMI (non-ST elevated myocardial infarction) (H), Coronary artery disease involving native coronary artery of native heart without angina pectoris       entecavir 0.5 MG tablet    BARACLUDE    90 tablet    Take 1 tablet (0.5 mg) by mouth daily    Chronic viral hepatitis B without delta agent and without coma (H), Chronic hepatitis B (H)       FLUoxetine 40 MG capsule    PROzac    90 capsule    Take 1 capsule (40 mg) by mouth every morning    Major depressive disorder, recurrent episode, moderate (H)       fluticasone 50 MCG/ACT spray    FLONASE    3 Bottle    Spray 1-2 sprays into both nostrils daily    Bronchitis with bronchospasm       fluticasone-salmeterol 250-50 MCG/DOSE diskus inhaler    ADVAIR    60 Inhaler    Inhale 1 puff into the lungs every 12 hours    Cough       ibuprofen 200 MG tablet    ADVIL/MOTRIN     Take 200 mg by mouth every 4 hours as needed for mild pain    Bronchitis, Essential hypertension, Coronary artery disease involving native heart without angina pectoris, unspecified vessel or lesion type       isosorbide mononitrate 30 MG 24 hr tablet     IMDUR    45 tablet    Take 0.5 tablets (15 mg) by mouth daily    Acute chest pain       latanoprost 0.005 % ophthalmic solution    XALATAN    3 Bottle    Place 1 drop into both eyes At Bedtime    Borderline glaucoma with ocular hypertension, bilateral       losartan 50 MG tablet    COZAAR    90 tablet    Take 0.5 tablets (25 mg) by mouth daily    Hypertension, unspecified type       * montelukast 10 MG tablet    SINGULAIR    30 tablet    Take 1 tablet (10 mg) by mouth At Bedtime    Cough       * montelukast 10 MG tablet    SINGULAIR    30 tablet    Take 1 tablet (10 mg) by mouth At Bedtime    Bronchitis with bronchospasm       MULTIVITAMIN/IRON PO      Take 1 tablet by mouth daily        mycophenolate 250 MG capsule    GENERIC EQUIVALENT    180 capsule    Take 3 capsules (750 mg) by mouth 2 times daily    Kidney transplanted       OMEGA 3 PO      Take 1 capsule by mouth daily        omeprazole 20 MG tablet    priLOSEC OTC    30 tablet    Take  by mouth daily.    Chronic hepatitis B (H), HTN (hypertension), Depression, Unspecified essential hypertension       predniSONE 5 MG tablet    DELTASONE    90 tablet    Take 1 tablet (5 mg) by mouth daily    Kidney transplanted       TYLENOL 325 MG tablet   Generic drug:  acetaminophen      Take 1-2 tablets by mouth every 6 hours as needed.        * Notice:  This list has 2 medication(s) that are the same as other medications prescribed for you. Read the directions carefully, and ask your doctor or other care provider to review them with you.

## 2018-06-15 ENCOUNTER — HOSPITAL ENCOUNTER (OUTPATIENT)
Dept: MRI IMAGING | Facility: CLINIC | Age: 58
Discharge: HOME | End: 2018-06-15
Attending: INTERNAL MEDICINE | Admitting: INTERNAL MEDICINE
Payer: MEDICARE

## 2018-06-15 DIAGNOSIS — I71.20 THORACIC AORTIC ANEURYSM WITHOUT RUPTURE (H): ICD-10-CM

## 2018-06-15 DIAGNOSIS — R93.89 ABNORMAL CT SCAN, CHEST: ICD-10-CM

## 2018-06-15 PROCEDURE — 71555 MRI ANGIO CHEST W OR W/O DYE: CPT

## 2018-06-15 PROCEDURE — A9585 GADOBUTROL INJECTION: HCPCS | Performed by: INTERNAL MEDICINE

## 2018-06-15 PROCEDURE — 71555 MRI ANGIO CHEST W OR W/O DYE: CPT | Mod: 26 | Performed by: INTERNAL MEDICINE

## 2018-06-15 PROCEDURE — 25000128 H RX IP 250 OP 636: Performed by: INTERNAL MEDICINE

## 2018-06-15 RX ORDER — GADOBUTROL 604.72 MG/ML
7.5 INJECTION INTRAVENOUS ONCE
Status: COMPLETED | OUTPATIENT
Start: 2018-06-15 | End: 2018-06-15

## 2018-06-15 RX ADMIN — GADOBUTROL 7.5 ML: 604.72 INJECTION INTRAVENOUS at 14:33

## 2018-06-16 DIAGNOSIS — F33.1 MAJOR DEPRESSIVE DISORDER, RECURRENT EPISODE, MODERATE (H): ICD-10-CM

## 2018-06-16 RX ORDER — FLUOXETINE 40 MG/1
40 CAPSULE ORAL EVERY MORNING
Qty: 30 CAPSULE | Refills: 0 | Status: CANCELLED | OUTPATIENT
Start: 2018-06-16

## 2018-06-16 NOTE — TELEPHONE ENCOUNTER
Medication requested: Fluoxetine 40 mg caps  Last refilled: 3-15-18  Qty: 90      Last seen: 1-18-18  RTC: 3 mo  Cancel: 4  No-show: 0  Next appt: 6*-21-18    Refill decision:   Refill pended and routed to the provider for review/determination due to cancellation x4 and Pt outside of RTC timeframe.      Kathleen M Doege RN

## 2018-06-19 DIAGNOSIS — J45.909 ASTHMA: Primary | ICD-10-CM

## 2018-06-21 ENCOUNTER — RADIANT APPOINTMENT (OUTPATIENT)
Dept: CARDIOLOGY | Facility: CLINIC | Age: 58
End: 2018-06-21
Payer: MEDICARE

## 2018-06-21 ENCOUNTER — OFFICE VISIT (OUTPATIENT)
Dept: PSYCHIATRY | Facility: CLINIC | Age: 58
End: 2018-06-21
Attending: NURSE PRACTITIONER
Payer: MEDICARE

## 2018-06-21 VITALS
SYSTOLIC BLOOD PRESSURE: 104 MMHG | BODY MASS INDEX: 25.67 KG/M2 | WEIGHT: 168.8 LBS | HEART RATE: 80 BPM | DIASTOLIC BLOOD PRESSURE: 72 MMHG

## 2018-06-21 DIAGNOSIS — I25.10 CORONARY ARTERY DISEASE INVOLVING NATIVE CORONARY ARTERY OF NATIVE HEART WITHOUT ANGINA PECTORIS: ICD-10-CM

## 2018-06-21 DIAGNOSIS — R93.89 ABNORMAL CT SCAN, CHEST: ICD-10-CM

## 2018-06-21 DIAGNOSIS — F33.1 MAJOR DEPRESSIVE DISORDER, RECURRENT EPISODE, MODERATE (H): ICD-10-CM

## 2018-06-21 DIAGNOSIS — Z94.0 KIDNEY REPLACED BY TRANSPLANT: ICD-10-CM

## 2018-06-21 DIAGNOSIS — Z48.298 AFTERCARE FOLLOWING ORGAN TRANSPLANT: ICD-10-CM

## 2018-06-21 DIAGNOSIS — Z79.899 ENCOUNTER FOR LONG-TERM CURRENT USE OF MEDICATION: ICD-10-CM

## 2018-06-21 LAB
ANION GAP SERPL CALCULATED.3IONS-SCNC: 7 MMOL/L (ref 3–14)
BUN SERPL-MCNC: 11 MG/DL (ref 7–30)
CALCIUM SERPL-MCNC: 9.1 MG/DL (ref 8.5–10.1)
CHLORIDE SERPL-SCNC: 107 MMOL/L (ref 94–109)
CO2 SERPL-SCNC: 27 MMOL/L (ref 20–32)
CREAT SERPL-MCNC: 0.81 MG/DL (ref 0.66–1.25)
CREAT UR-MCNC: 206 MG/DL
CYCLOSPORINE BLD LC/MS/MS-MCNC: <25 UG/L (ref 50–400)
ERYTHROCYTE [DISTWIDTH] IN BLOOD BY AUTOMATED COUNT: 16.6 % (ref 10–15)
GFR SERPL CREATININE-BSD FRML MDRD: >90 ML/MIN/1.7M2
GLUCOSE SERPL-MCNC: 94 MG/DL (ref 70–99)
HCT VFR BLD AUTO: 40.9 % (ref 40–53)
HGB BLD-MCNC: 12.9 G/DL (ref 13.3–17.7)
MCH RBC QN AUTO: 27.9 PG (ref 26.5–33)
MCHC RBC AUTO-ENTMCNC: 31.5 G/DL (ref 31.5–36.5)
MCV RBC AUTO: 88 FL (ref 78–100)
PLATELET # BLD AUTO: 332 10E9/L (ref 150–450)
POTASSIUM SERPL-SCNC: 3.8 MMOL/L (ref 3.4–5.3)
PROT UR-MCNC: 0.25 G/L
PROT/CREAT 24H UR: 0.12 G/G CR (ref 0–0.2)
RBC # BLD AUTO: 4.63 10E12/L (ref 4.4–5.9)
SODIUM SERPL-SCNC: 142 MMOL/L (ref 133–144)
TME LAST DOSE: ABNORMAL H
WBC # BLD AUTO: 8.1 10E9/L (ref 4–11)

## 2018-06-21 PROCEDURE — 80158 DRUG ASSAY CYCLOSPORINE: CPT | Performed by: INTERNAL MEDICINE

## 2018-06-21 PROCEDURE — G0463 HOSPITAL OUTPT CLINIC VISIT: HCPCS | Mod: ZF

## 2018-06-21 RX ORDER — FLUOXETINE 40 MG/1
40 CAPSULE ORAL EVERY MORNING
Qty: 90 CAPSULE | Refills: 1 | Status: SHIPPED | OUTPATIENT
Start: 2018-06-21 | End: 2018-12-13

## 2018-06-21 ASSESSMENT — PAIN SCALES - GENERAL: PAINLEVEL: SEVERE PAIN (7)

## 2018-06-21 NOTE — MR AVS SNAPSHOT
After Visit Summary   6/21/2018    Jerad Ross    MRN: [id number]           Patient Information     Date Of Birth          1960        Visit Information        Provider Department      6/21/2018 3:30 PM Genia Fraser APRN CNP Psychiatry Clinic        Today's Diagnoses     Major depressive disorder, recurrent episode, moderate (H)           Follow-ups after your visit        Follow-up notes from your care team     Return in about 6 months (around 12/21/2018), or if symptoms worsen or fail to improve, for Routine Visit.      Your next 10 appointments already scheduled     Dec 11, 2018  1:55 PM CST   (Arrive by 1:25 PM)   Return Kidney Transplant with  Kidney/Pancreas Recipient   [facility] Nephrology ([facility])    [address]  [address]  [facility] [id number] [phone]            Dec 13, 2018 10:30 AM CST   (Arrive by 10:15 AM)   Return Visit with Sisi Lockwood MD   [facility] ([facility])    [address]  [address]  [facility] [id number] [phone]            Dec 13, 2018 12:30 PM CST   Adult Med Follow UP with RONALDO Dempsey CNP   Psychiatry Clinic ([facility])    [place] [id number]  [address] [place] [address] [phone]              Who to contact     Please call your clinic at [phone] to:    Ask questions about your health    Make or cancel appointments    Discuss your medicines    Learn about your test results    Speak to your doctor            Additional Information About Your Visit        MyChart Information     Neomatrixt gives you secure access to your electronic health record. If you see a primary care provider, you can also send messages to your care team and make appointments. If you have questions, please call your primary care clinic.  If you do not have a primary care provider, please call  [phone] and they will assist you.      myBestHelper is an electronic gateway that provides easy, online access to your medical records. With myBestHelper, you can request a clinic appointment, read your test results, renew a prescription or communicate with your care team.     To access your existing account, please contact your [facility] or call [phone] for assistance.        Care EveryWhere ID     This is your Care EveryWhere ID. This could be used by other organizations to access your [place] medical records  [id number]        Your Vitals Were     Pulse BMI (Body Mass Index)                80 25.67 kg/m2           Blood Pressure from Last 3 Encounters:   06/21/18 104/72   06/12/18 113/77   06/07/18 117/76    Weight from Last 3 Encounters:   06/21/18 76.6 kg (168 lb 12.8 oz)   06/12/18 76.5 kg (168 lb 9.6 oz)   06/07/18 77.6 kg (171 lb)              Today, you had the following     No orders found for display         Where to get your medicines      These medications were sent to [facility] [address] [place], [place] [address] [place] [facility] [address]  [address] [facility] [address]     Phone:  [phone]     FLUoxetine 40 MG capsule          Primary Care Provider Office Phone # Fax #    Aston Reji Perry [phone]       [address] [place] [address]        Equal Access to Services     CARLOS MENDOZA AH: Hadii aad ku hadasho Soomaali, waaxda luqadaha, qaybta kaalmada adeegyada, waxAyalogic idiin haywojciechn rose salamanca. So [facility] [phone].    ATENCIÓN: Si habla español, tiene a sanchez disposición servicios gratuitos de asistencia lingüística. Llame al [phone].    We comply with applicable federal civil rights laws and [place] laws. We do not discriminate on the basis of race, color, national origin, age, disability, sex, sexual orientation, or gender identity.            Thank you!      Thank you for choosing PSYCHIATRY CLINIC  for your care. Our goal is always to provide you with excellent care. Hearing back from our patients is one way we can continue to improve our services. Please take a few minutes to complete the written survey that you may receive in the mail after your visit with us. Thank you!             Your Updated Medication List - Protect others around you: Learn how to safely use, store and throw away your medicines at www.disposemymeds.org.          This list is accurate as of 6/21/18 11:59 PM.  Always use your most recent med list.                   Brand Name Dispense Instructions for use Diagnosis    albuterol 108 (90 Base) MCG/ACT Inhaler    PROAIR HFA    1 Inhaler    Inhale 2 puffs into the lungs every 6 hours as needed for shortness of breath / dyspnea or wheezing    Cough, Wheezing       amLODIPine 5 MG tablet    NORVASC    90 tablet    Take 1 tablet (5 mg) by mouth daily    Acute chest pain       aspirin 81 MG tablet      Take by mouth daily        atorvastatin 20 MG tablet    LIPITOR    90 tablet    Take 1 tablet (20 mg) by mouth daily    NSTEMI (non-ST elevated myocardial infarction) (H)       BETA BLOCKER NOT PRESCRIBED (INTENTIONAL)      Beta Blocker not prescribed intentionally due to Bradycardia < 50 bpm without beta blocker therapy    NSTEMI (non-ST elevated myocardial infarction) (H)       BUDESONIDE (INHALATION) 90 MCG/ACT Aepb     1 each    Inhale 2 puffs into the lungs 2 times daily    Asthma       calcium citrate-vitamin D 315-200 MG-UNIT Tabs per tablet    CITRACAL     Take 1 tablet by mouth daily.        clopidogrel 75 MG tablet    PLAVIX    90 tablet    Take 1 tablet (75 mg) by mouth daily    NSTEMI (non-ST elevated myocardial infarction) (H), Coronary artery disease involving native coronary artery of native heart without angina pectoris       entecavir 0.5 MG tablet    BARACLUDE    90 tablet    Take 1 tablet (0.5 mg) by mouth daily    Chronic viral hepatitis  B without delta agent and without coma (H), Chronic hepatitis B (H)       FLUoxetine 40 MG capsule    PROzac    90 capsule    Take 1 capsule (40 mg) by mouth every morning    Major depressive disorder, recurrent episode, moderate (H)       fluticasone 50 MCG/ACT spray    FLONASE    3 Bottle    Spray 1-2 sprays into both nostrils daily    Bronchitis with bronchospasm       fluticasone-salmeterol 250-50 MCG/DOSE diskus inhaler    ADVAIR    60 Inhaler    Inhale 1 puff into the lungs every 12 hours    Cough       ibuprofen 200 MG tablet    ADVIL/MOTRIN     Take 200 mg by mouth every 4 hours as needed for mild pain    Bronchitis, Essential hypertension, Coronary artery disease involving native heart without angina pectoris, unspecified vessel or lesion type       isosorbide mononitrate 30 MG 24 hr tablet    IMDUR    45 tablet    Take 0.5 tablets (15 mg) by mouth daily    Acute chest pain       latanoprost 0.005 % ophthalmic solution    XALATAN    3 Bottle    Place 1 drop into both eyes At Bedtime    Borderline glaucoma with ocular hypertension, bilateral       losartan 50 MG tablet    COZAAR    90 tablet    Take 0.5 tablets (25 mg) by mouth daily    Hypertension, unspecified type       * montelukast 10 MG tablet    SINGULAIR    30 tablet    Take 1 tablet (10 mg) by mouth At Bedtime    Cough       * montelukast 10 MG tablet    SINGULAIR    30 tablet    Take 1 tablet (10 mg) by mouth At Bedtime    Bronchitis with bronchospasm       MULTIVITAMIN/IRON PO      Take 1 tablet by mouth daily        mycophenolate 250 MG capsule    GENERIC EQUIVALENT    180 capsule    Take 3 capsules (750 mg) by mouth 2 times daily    Kidney transplanted       OMEGA 3 PO      Take 1 capsule by mouth daily        omeprazole 20 MG tablet    priLOSEC OTC    30 tablet    Take  by mouth daily.    Chronic hepatitis B (H), HTN (hypertension), Depression, Unspecified essential hypertension       predniSONE 5 MG tablet    DELTASONE    90 tablet    Take 1  tablet (5 mg) by mouth daily    Kidney transplanted       TYLENOL 325 MG tablet   Generic drug:  acetaminophen      Take 1-2 tablets by mouth every 6 hours as needed.        * Notice:  This list has 2 medication(s) that are the same as other medications prescribed for you. Read the directions carefully, and ask your doctor or other care provider to review them with you.

## 2018-06-21 NOTE — PROGRESS NOTES
Psychiatry Clinic Progress Note                                                                   Jerad Ross is a 58 year old male who prefers the name Jerad and pronoun he, his and him.  Therapist: None  PCP: Aston Perry    Pertinent Background:  See previous notes.  Psych critical item history includes [no critical items].     Interim History                                                                                                        4, 4     The patient is a good historian, reports good treatment adherence and was last seen on 1/18 when he chose to continue fluoxetine 40mg daily.    Since the last visit, he reports he is doing OK and for the most part his mood has been good.  - his last weekend was hard. Challenging for him to spend the weekend with his MAURICE who is a surgeon.  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  - feels less successful in comparison.  - knows rationally his thoughts are not rational but wants to discuss his difficulty emotions  - his wife has been busy with her graduate classes, he tries to talk to her but she's distractible  - recites a poem he's written from memory, this poem will be published.  - Getting feedback on his poems from other poets leading him to feel more vulnerable  - continues to write more poetry about his father, this is cathartic for Jerad  - he enjoys his in-person writing groups  - walking with his wife  - taking train and bus for transportation      Recent Symptoms:   Depression:  denies   Anxiety:  denies    ADVERSE EFFECTS: denies  MEDICAL CONCERNS:  He had a couple of respiratory problems this winter, providers following for possible enlarged pulmonary artery.     Recent Substance Use:  Limits coffee and diet soda        Social/ Family History                                  [per patient report]                                 1ea,1ea     The patient was  "born and raised in [place]; he moved to [place] for his 2nd kidney transplant in early 1990s. He has one younger brother and sister. His parents were  until his Dad's death in 2014 (FTT in his 80s while taking dialysis). He denies abuse or trauma history. He  in 1993; they have no kids. Social supports include his sponsor and few people in his program; he and his wife are working on their marriage. He lives alone with his wife.     He graduated in [place] from [place] school. BA in business at [employer] in [place]. Master of Health Services Administration at [employer]. 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 He wished he would have pursued becoming a medical doctor.     He denies legal or  history. He grew up \"culturally\" Mormon,  patient identifies as a Rastafari who believes in José Luis. He is able to lean into his rasheed. Finances are adequate and his basic needs are met. SSDI started about 2009.    Medical / Surgical History                                                                                                                  Patient Active Problem List   Diagnosis     BCC (basal cell carcinoma), trunk     JULIANE (obstructive sleep apnea)     Hypertension secondary to other renal disorders     Coronary artery disease involving native coronary artery of native heart without angina pectoris     NSTEMI (non-ST elevated myocardial infarction) (H)     Depression     Diverticulosis     Hemorrhoids     Kidney replaced by transplant     Basal cell carcinoma     Squamous cell carcinoma     Dyslipidemia     CRP elevated     Glaucoma     AION (acute ischemic optic neuropathy)     Paracentral scotoma     Hip pain     Inflamed seborrheic keratosis     Intertrigo     Chronic hepatitis B (H)     Immunosuppressed " status (H)     Hypogonadism in male     GERD (gastroesophageal reflux disease)     Aftercare following organ transplant       Past Surgical History:   Procedure Laterality Date     APPENDECTOMY       CATARACT IOL, RT/LT  4/19/2000    RE     CATARACT IOL, RT/LT  6/1/2000    LE     COLECTOMY SUBTOTAL  1983    10 cm, diverticulitis     COLONOSCOPY  2/13/2012    Procedure:COLONOSCOPY; Surgeon:SLOAN GALLARDO; Location: GI     ESOPHAGOSCOPY, GASTROSCOPY, DUODENOSCOPY (EGD), COMBINED  2/13/2012    Procedure:COMBINED ESOPHAGOSCOPY, GASTROSCOPY, DUODENOSCOPY (EGD); Surgeon:SLOAN GALLARDO; Location:[facility]     EXTRACAPSULAR CATARACT EXTRATION WITH INTRAOCULAR LENS IMPLANT  4-20-10, 6-1-10    Rt, Lt     HERNIA REPAIR  1995    Lt inguinal     HIP SURGERY      1981, bilat MITZI, revised 2001, 2005     KIDNEY SURGERY  1978 and 1993    transplant     KNEE SURGERY  1983, 1987    bilat TKA     MOHS MICROGRAPHIC PROCEDURE       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  Allergy                                Penicillins; Keflex [cephalexin hcl]; Tetracycline; and Sulfa drugs  Current Medications                                                                                                       Current Outpatient Prescriptions   Medication Sig Dispense Refill     acetaminophen (TYLENOL) 325 MG tablet Take 1-2 tablets by mouth every 6 hours as needed.       amLODIPine (NORVASC) 5 MG tablet Take 1 tablet (5 mg) by mouth daily 90 tablet 1     aspirin 81 MG tablet Take by mouth daily       atorvastatin (LIPITOR) 20 MG tablet Take 1 tablet (20 mg) by mouth daily 90 tablet 1     calcium citrate-vitamin D (CITRACAL) 315-200 MG-UNIT TABS Take 1 tablet by mouth  daily.       clopidogrel (PLAVIX) 75 MG tablet Take 1 tablet (75 mg) by mouth daily 90 tablet 1     entecavir (BARACLUDE) 0.5 MG tablet Take 1 tablet (0.5 mg) by mouth daily 90 tablet 3     FLUoxetine (PROZAC) 40 MG capsule Take 1 capsule (40 mg) by mouth every morning 90 capsule 0     ibuprofen (ADVIL,MOTRIN) 200 MG tablet Take 200 mg by mouth every 4 hours as needed for mild pain       isosorbide mononitrate (IMDUR) 30 MG 24 hr tablet Take 0.5 tablets (15 mg) by mouth daily 45 tablet 1     latanoprost (XALATAN) 0.005 % ophthalmic solution Place 1 drop into both eyes At Bedtime 3 Bottle 3     Multiple Vitamins-Iron (MULTIVITAMIN/IRON PO) Take 1 tablet by mouth daily        mycophenolate (CELLCEPT - GENERIC EQUIVALENT) 250 MG capsule Take 3 capsules (750 mg) by mouth 2 times daily 180 capsule 11     Omega-3 Fatty Acids (OMEGA 3 PO) Take 1 capsule by mouth daily       omeprazole (PRILOSEC OTC) 20 MG tablet Take  by mouth daily. 30 tablet      predniSONE (DELTASONE) 5 MG tablet Take 1 tablet (5 mg) by mouth daily 90 tablet 3     albuterol (PROAIR HFA) 108 (90 Base) MCG/ACT Inhaler Inhale 2 puffs into the lungs every 6 hours as needed for shortness of breath / dyspnea or wheezing (Patient not taking: Reported on 6/12/2018) 1 Inhaler 2     BETA BLOCKER NOT PRESCRIBED, INTENTIONAL, Beta Blocker not prescribed intentionally due to Bradycardia < 50 bpm without beta blocker therapy  0     BUDESONIDE, INHALATION, 90 MCG/ACT AEPB Inhale 2 puffs into the lungs 2 times daily (Patient not taking: Reported on 6/21/2018) 1 each 3     fluticasone (FLONASE) 50 MCG/ACT spray Spray 1-2 sprays into both nostrils daily (Patient not taking: Reported on 6/12/2018) 3 Bottle 11     fluticasone-salmeterol (ADVAIR) 250-50 MCG/DOSE diskus inhaler Inhale 1 puff into the lungs every 12 hours (Patient not taking: Reported on 6/12/2018) 60 Inhaler 1     losartan (COZAAR) 50 MG tablet Take 0.5 tablets (25 mg) by mouth daily (Patient not taking:  "Reported on 6/7/2018) 90 tablet 3     montelukast (SINGULAIR) 10 MG tablet Take 1 tablet (10 mg) by mouth At Bedtime (Patient not taking: Reported on 6/12/2018) 30 tablet 1     montelukast (SINGULAIR) 10 MG tablet Take 1 tablet (10 mg) by mouth At Bedtime (Patient not taking: Reported on 6/21/2018) 30 tablet 3     Vitals                                                                                                                       3, 3   /72  Pulse 80  Wt 76.6 kg (168 lb 12.8 oz)  BMI 25.67 kg/m2   Mental Status Exam                                                                                    9, 14 cog gs     Alertness: alert  and oriented  Appearance: adequately groomed  Behavior/Demeanor: cooperative and calm, with good  eye contact   Speech: normal  Language: no problems  Psychomotor: normal or unremarkable  Mood: \"pretty good\"  Affect: full range; was congruent to mood; was congruent to content  Thought Process/Associations: unremarkable  Thought Content:  Reports none;  Denies suicidal and violent ideation  Perception:  Reports none;  Denies auditory hallucinations and visual hallucinations  Insight: good and adequate  Judgment: good and adequate for safety  Cognition: (6) does  appear grossly intact; formal cognitive testing was not done  Gait/Station and/or Muscle Strength/Tone: unremarkable    Labs and Data                                                                                                                 Rating Scales:    PHQ9    PHQ9 Today:  7  PHQ-9 SCORE 7/20/2017 10/19/2017 1/18/2018   Total Score - - -   Total Score 6 11 2     Diagnosis and Assessment                                                                             m2, h3     DIAGNOSIS: MDD, Recurrent, Moderate    Today the following issues were addressed:    1) medication refills  2) therapy  3) labs    [place] Prescription Monitoring Program [] review was not needed today.    PSYCHOTROPIC DRUG INTERACTIONS: " none clinically relevant    Plan                                                                                                                    m2, h3      1) continue fluoxetine 40mg daily (needs)  2) encouraged therapy, a safe place to process his fear and sense of vulnerability  3) PCP follows INR, also sees cardiologist, gastroenterology, nephrology, pulmonology, transplant team    RTC: 6 months, sooner as needed    CRISIS NUMBERS:   Provided routinely in AVS.    Treatment Risk Statement:  The patient understands the risks, benefits, adverse effects and alternatives. Agrees to treatment with the capacity to do so. No medical contraindications to treatment. Agrees to call clinic for any problems. The patient understands to call 911 or go to the nearest ED if life threatening or urgent symptoms occur.   PROVIDER:  RONALDO Lyon CNP

## 2018-06-25 DIAGNOSIS — Z79.899 ENCOUNTER FOR LONG-TERM CURRENT USE OF MEDICATION: ICD-10-CM

## 2018-06-25 DIAGNOSIS — Z48.298 AFTERCARE FOLLOWING ORGAN TRANSPLANT: ICD-10-CM

## 2018-06-25 DIAGNOSIS — Z94.0 KIDNEY REPLACED BY TRANSPLANT: Primary | ICD-10-CM

## 2018-06-25 NOTE — LETTER
[facility]  Room 2-200  [facility],  [address]  [address]  Tel [phone]  Toll Free [phone]                OUTPATIENT LABORATORY TEST ORDER    Patient Name: Jerad Ross  Transplant Date: 10/6/1993 (Kidney)  YOB: 1960                                                         Issue Date & Time:June 25, 2018  9:53 AM    [facility] MR:  [id number]  Exp. Date (1 year after date issued)      Diagnoses: Kidney Transplant (ICD-10  Z94.0)   Long term use of medications (ICD-10  Z79.899)     Lab results to be available on the same day drawn.   Patient should release information to the [facility].  Please fax to the Transplant Center at [phone].    Monthly   ?Hemogram and Platelet  ?Basic Metabolic Panel (Sodium, Potassium, Chloride, CO2, Creatinine, Urea Nitrogen, Glucose, Calcium)    Every 6 Months                                        ?Urine for protein/creatinine    If you have any questions, please call [facility] at [phone] or [phone].    Please fax labs to [phone]  .

## 2018-06-26 ASSESSMENT — PATIENT HEALTH QUESTIONNAIRE - PHQ9: SUM OF ALL RESPONSES TO PHQ QUESTIONS 1-9: 7

## 2018-06-27 ENCOUNTER — TRANSFERRED RECORDS (OUTPATIENT)
Dept: HEALTH INFORMATION MANAGEMENT | Facility: CLINIC | Age: 58
End: 2018-06-27

## 2018-07-06 ENCOUNTER — TELEPHONE (OUTPATIENT)
Dept: TRANSPLANT | Facility: CLINIC | Age: 58
End: 2018-07-06

## 2018-07-06 NOTE — TELEPHONE ENCOUNTER
Cyclosporine <25    PLAN:  Previous levels within goal.  Call Jerad Ross and confirm good trough and no missed doses.  Recheck CSA level. If level remains undetected, will plan to increase.

## 2018-07-06 NOTE — TELEPHONE ENCOUNTER
Left message for patient regarding CSA level = <25.  Instructed patient return call and confirm that he has not missed any doses.  Instructed patient repeat labs next week.

## 2018-07-20 ENCOUNTER — TELEPHONE (OUTPATIENT)
Dept: INTERNAL MEDICINE | Facility: CLINIC | Age: 58
End: 2018-07-20

## 2018-07-20 DIAGNOSIS — H47.019 AION (ACUTE ISCHEMIC OPTIC NEUROPATHY): Primary | ICD-10-CM

## 2018-07-20 NOTE — TELEPHONE ENCOUNTER
[facility] Call Center    Phone Message    May a detailed message be left on voicemail: yes    Reason for Call: Other: Patient returning Fadia's call. He says it is okay to leave a detailed message on his voicemail.      Action Taken: Message routed to:  [facility] Primary Care

## 2018-07-20 NOTE — TELEPHONE ENCOUNTER
[facility] Call Center    Phone Message    May a detailed message be left on voicemail: no    Reason for Call: Other: Patient is requesting a referral to Dr. Bradly Juarez in Ophthalmology. He is specifically requesting Dr. Juarez. Please let him know via [place] when referral placed. Thank you      Action Taken: Message routed to:  [facility] Primary Care

## 2018-07-26 DIAGNOSIS — Z94.0 KIDNEY TRANSPLANTED: Primary | ICD-10-CM

## 2018-07-26 RX ORDER — MYCOPHENOLATE MOFETIL 250 MG/1
750 CAPSULE ORAL 2 TIMES DAILY
Qty: 180 CAPSULE | Refills: 11 | Status: SHIPPED | OUTPATIENT
Start: 2018-07-26 | End: 2018-08-02

## 2018-07-31 ENCOUNTER — TELEPHONE (OUTPATIENT)
Dept: TRANSPLANT | Facility: CLINIC | Age: 58
End: 2018-07-31

## 2018-07-31 NOTE — TELEPHONE ENCOUNTER
Provider Call: Medication Refill  Route to LPN  Pharmacy Name: [facility]  Pharmacy Location: [id number] store [address]  Name of Medication: Mycrophenalate Dose: 250mg  When will the patient be out of this medication?: Less than 3 days (Route high priority)  Callback needed? No

## 2018-08-02 DIAGNOSIS — Z94.0 KIDNEY TRANSPLANTED: Primary | ICD-10-CM

## 2018-08-02 RX ORDER — MYCOPHENOLATE MOFETIL 250 MG/1
750 CAPSULE ORAL 2 TIMES DAILY
Qty: 180 CAPSULE | Refills: 11 | Status: SHIPPED | OUTPATIENT
Start: 2018-08-02 | End: 2019-08-19

## 2018-08-07 ENCOUNTER — OFFICE VISIT (OUTPATIENT)
Dept: OPHTHALMOLOGY | Facility: CLINIC | Age: 58
End: 2018-08-07
Attending: OPHTHALMOLOGY
Payer: MEDICARE

## 2018-08-07 DIAGNOSIS — H53.10 SUBJECTIVE VISUAL DISTURBANCE: ICD-10-CM

## 2018-08-07 DIAGNOSIS — H53.10 SUBJECTIVE VISUAL DISTURBANCE: Primary | ICD-10-CM

## 2018-08-07 DIAGNOSIS — H26.493 PCO (POSTERIOR CAPSULAR OPACIFICATION), BILATERAL: ICD-10-CM

## 2018-08-07 DIAGNOSIS — H47.013 ANTERIOR ISCHEMIC OPTIC NEUROPATHY, BILATERAL: Primary | ICD-10-CM

## 2018-08-07 PROCEDURE — G0463 HOSPITAL OUTPT CLINIC VISIT: HCPCS | Mod: ZF | Performed by: TECHNICIAN/TECHNOLOGIST

## 2018-08-07 PROCEDURE — 92133 CPTRZD OPH DX IMG PST SGM ON: CPT | Mod: ZF | Performed by: OPHTHALMOLOGY

## 2018-08-07 PROCEDURE — 92083 EXTENDED VISUAL FIELD XM: CPT | Mod: ZF | Performed by: OPHTHALMOLOGY

## 2018-08-07 ASSESSMENT — SLIT LAMP EXAM - LIDS
COMMENTS: NORMAL
COMMENTS: NORMAL

## 2018-08-07 ASSESSMENT — VISUAL ACUITY
METHOD: SNELLEN - LINEAR
OS_SC: 20/30
OS_SC+: -2+2

## 2018-08-07 ASSESSMENT — CUP TO DISC RATIO
OS_RATIO: 0.65
OD_RATIO: 0.2

## 2018-08-07 ASSESSMENT — TONOMETRY
OD_IOP_MMHG: 19
OS_IOP_MMHG: 18
IOP_METHOD: ICARE

## 2018-08-07 ASSESSMENT — EXTERNAL EXAM - LEFT EYE: OS_EXAM: NORMAL

## 2018-08-07 ASSESSMENT — EXTERNAL EXAM - RIGHT EYE: OD_EXAM: NORMAL

## 2018-08-07 NOTE — PROGRESS NOTES
Assessment & Plan     Jerad Ross is a 58 year old male with the following diagnoses:   1. Anterior ischemic optic neuropathy, bilateral    2. Subjective visual disturbance    3. PCO (posterior capsular opacification), bilateral         Initial episode of anterior ischemic optic neuropathy in 2003 in left eye with another episode in 2005 in right eye. Does not drive due to resulting blind spots. No recent vision changes. Denies headaches, jaw claudication, loss of appetite, or significant weight loss. He is interested in ongoing research in AION and would like a referral to our low vision specialists.    On mycophenylate and prednisone 5 mg for history of kidney transplant in 1993. History of ocular hypertension on latanoprost both eyes at bedtime, followed by Dr. Rizzo. S/p cataract surgery both eyes 2010.    Uncorrected visual acuity is 20/250 eccentric in the right eye and 20/30 -2 in the right eye. Color vision is normal in both eyes and intraocular pressures are normal. Extraocular movements are full. There is a relative afferent pupillary defect on the right.  Anterior segment exam with posterior chamber intraocular lens (PCIOL) and PCO both eyes.   Visual fields show stable right central scotoma and left constriction since 2017.    Optic nerve OCT shows stable diffuse retinal nerve fiber layer thinning in both eyes, likely floor effect.    It is my impression that patient is stable Anterior ischemic optic neuropathy (AION) .  Could consider low vision evaluation with Angeles Sanchez and a low vision refraction with Dr. Bella.  Patient also has posterior capsular opacity (PCO) of right eye.  Could consider YAG capsulotomy with Dr. Rizzo.           Attending Physician Attestation:  Complete documentation of historical and exam elements from today's encounter can be found in the full encounter summary report (not reduplicated in this progress note).  I personally obtained the chief complaint(s) and  history of present illness.  I confirmed and edited as necessary the review of systems, past medical/surgical history, family history, social history, and examination findings as documented by others; and I examined the patient myself.  I personally reviewed the relevant tests, images, and reports as documented above.  I formulated and edited as necessary the assessment and plan and discussed the findings and management plan with the patient and family. - Bradly Juarez MD

## 2018-08-07 NOTE — MR AVS SNAPSHOT
After Visit Summary   8/7/2018    Jerad Ross    MRN: [id number]           Patient Information     Date Of Birth          1960        Visit Information        Provider Department      8/7/2018 8:30 AM Bradly Juarez MD [facility]        Today's Diagnoses     Anterior ischemic optic neuropathy, bilateral    -  1    Subjective visual disturbance        PCO (posterior capsular opacification), bilateral           Follow-ups after your visit        Additional Services     OCCUPATIONAL THERAPY REFERRAL       Low Vision Referral to Angeles Sanchez                  Follow-up notes from your care team     Return for low vision refraction with Dr. Braswell and Angeles Sanchez same day .      Your next 10 appointments already scheduled     Aug 09, 2018  1:00 PM CDT   Evaluation with Anglees Sanchez, OT   [facility] [place], Occupational Therapy, Vision - Outpatie ([facility], [facility])    [address] [place] [address]  9th [facility], Clinic 9a  [facility] [address]   [phone]            Aug 09, 2018  3:00 PM CDT   New Low Vision with Kenneth Bella, OD   Eye Clinic ([facility])    Kameron Kaplan [address]  Clinic [address] [place] [address]   [phone]            Oct 23, 2018 10:20 AM CDT   (Arrive by 10:05 AM)   Return Visit with Aston Perry MD   [facility] ([facility])    [address] [place] [address]   [phone]            Nov 02, 2018  9:30 AM CDT   RETURN GLAUCOMA with Viki Rizzo MD   Eye Clinic ([facility])    Kameron Kaplan [address] [address]   [phone]            Dec 11, 2018  1:55 PM CST   (Arrive by 1:25 PM)   Return Kidney Transplant with  Kidney/Pancreas Recipient   [facility] Nephrology ([facility])    [address] [place] [address]  [address]  [facility] [place] [address]   [phone]            Dec 13, 2018 10:30 AM CST   (Arrive by 10:15 AM)   Return Visit with Sisi Lockwood MD   [facility] ([facility])    [address] [facility] [address]  [address]  [facility] [place] [address]   [phone]            Dec 13, 2018 12:30 PM CST   Adult Med Follow UP with RONALDO Dempsey CNP   Psychiatry Clinic ([facility])    [address]  [address] [id number] [phone]              Future tests that were ordered for you today     Open Future Orders        Priority Expected Expires Ordered    OCCUPATIONAL THERAPY REFERRAL Routine  8/7/2019 8/7/2018            Who to contact     Please call your clinic at [phone] to:    Ask questions about your health    Make or cancel appointments    Discuss your medicines    Learn about your test results    Speak to your doctor            Additional Information About Your Visit        [employer] Information     [employer] gives you secure access to your electronic health record. If you see a primary care provider, you can also send messages to your care team and make appointments. If you have questions, please call your primary care clinic.  If you do not have a primary care provider, please call [phone] and they will assist you.      [employer] is an electronic gateway that provides easy, online access to your medical records. With [employer], you can request a clinic appointment, read your test results, renew a prescription or communicate with your care team.     To access your existing account, please contact your [facility] or call [phone] for assistance.        Care EveryWhere ID     This is your Care EveryWhere ID. This could be used by other organizations to access your [facility] medical records  [id number]         Blood Pressure from Last 3 Encounters:   06/12/18  113/77   06/07/18 117/76   05/16/18 119/80    Weight from Last 3 Encounters:   06/12/18 76.5 kg (168 lb 9.6 oz)   06/07/18 77.6 kg (171 lb)   05/16/18 76.9 kg (169 lb 9.6 oz)              We Performed the Following     DILATED FUNDUS EXAM     Glaucoma Top OU     IOP Measurement     OCT Optic Nerve RNFL Spectralis OU (both eyes)        Primary Care Provider Office Phone # Fax #    Aston Reji Perry [phone]       [address] [place] [address]        Equal Access to Services     TOD [facility] : Hadii aad ku hadasho Soaristeo, waaxda luqadaha, qaybta kaalmada adeezioyajosie, nish gates . So [facility] [phone].    ATENCIÓN: Si habla español, tiene a sanchez disposición servicios gratuitos de asistencia lingüística. [facility] [phone].    We comply with applicable federal civil rights laws and [place] laws. We do not discriminate on the basis of race, color, national origin, age, disability, sex, sexual orientation, or gender identity.            Thank you!     Thank you for choosing EYE CLINIC  for your care. Our goal is always to provide you with excellent care. Hearing back from our patients is one way we can continue to improve our services. Please take a few minutes to complete the written survey that you may receive in the mail after your visit with us. Thank you!             Your Updated Medication List - Protect others around you: Learn how to safely use, store and throw away your medicines at www.disposemymeds.org.          This list is accurate as of 8/7/18 10:11 AM.  Always use your most recent med list.                   Brand Name Dispense Instructions for use Diagnosis    albuterol 108 (90 Base) MCG/ACT Inhaler    PROAIR HFA    1 Inhaler    Inhale 2 puffs into the lungs every 6 hours as needed for shortness of breath / dyspnea or wheezing    Cough, Wheezing       amLODIPine 5 MG tablet    NORVASC    90 tablet    Take 1 tablet (5 mg) by mouth  daily    Acute chest pain       aspirin 81 MG tablet      Take by mouth daily        atorvastatin 20 MG tablet    LIPITOR    90 tablet    Take 1 tablet (20 mg) by mouth daily    NSTEMI (non-ST elevated myocardial infarction) (H)       BETA BLOCKER NOT PRESCRIBED (INTENTIONAL)      Beta Blocker not prescribed intentionally due to Bradycardia < 50 bpm without beta blocker therapy    NSTEMI (non-ST elevated myocardial infarction) (H)       BUDESONIDE (INHALATION) 90 MCG/ACT Aepb     1 each    Inhale 2 puffs into the lungs 2 times daily    Asthma       calcium citrate-vitamin D 315-200 MG-UNIT Tabs per tablet    CITRACAL     Take 1 tablet by mouth daily.        clopidogrel 75 MG tablet    PLAVIX    90 tablet    Take 1 tablet (75 mg) by mouth daily    NSTEMI (non-ST elevated myocardial infarction) (H), Coronary artery disease involving native coronary artery of native heart without angina pectoris       entecavir 0.5 MG tablet    BARACLUDE    90 tablet    Take 1 tablet (0.5 mg) by mouth daily    Chronic viral hepatitis B without delta agent and without coma (H), Chronic hepatitis B (H)       FLUoxetine 40 MG capsule    PROzac    90 capsule    Take 1 capsule (40 mg) by mouth every morning    Major depressive disorder, recurrent episode, moderate (H)       fluticasone 50 MCG/ACT spray    FLONASE    3 Bottle    Spray 1-2 sprays into both nostrils daily    Bronchitis with bronchospasm       fluticasone-salmeterol 250-50 MCG/DOSE diskus inhaler    ADVAIR    60 Inhaler    Inhale 1 puff into the lungs every 12 hours    Cough       ibuprofen 200 MG tablet    ADVIL/MOTRIN     Take 200 mg by mouth every 4 hours as needed for mild pain    Bronchitis, Essential hypertension, Coronary artery disease involving native heart without angina pectoris, unspecified vessel or lesion type       isosorbide mononitrate 30 MG 24 hr tablet    IMDUR    45 tablet    Take 0.5 tablets (15 mg) by mouth daily    Acute chest pain       latanoprost 0.005  % ophthalmic solution    XALATAN    3 Bottle    Place 1 drop into both eyes At Bedtime    Borderline glaucoma with ocular hypertension, bilateral       losartan 50 MG tablet    COZAAR    90 tablet    Take 0.5 tablets (25 mg) by mouth daily    Hypertension, unspecified type       * montelukast 10 MG tablet    SINGULAIR    30 tablet    Take 1 tablet (10 mg) by mouth At Bedtime    Cough       * montelukast 10 MG tablet    SINGULAIR    30 tablet    Take 1 tablet (10 mg) by mouth At Bedtime    Bronchitis with bronchospasm       MULTIVITAMIN/IRON PO      Take 1 tablet by mouth daily        mycophenolate 250 MG capsule    GENERIC EQUIVALENT    180 capsule    Take 3 capsules (750 mg) by mouth 2 times daily    Kidney transplanted       OMEGA 3 PO      Take 1 capsule by mouth daily        omeprazole 20 MG tablet    priLOSEC OTC    30 tablet    Take  by mouth daily.    Chronic hepatitis B (H), HTN (hypertension), Depression, Unspecified essential hypertension       predniSONE 5 MG tablet    DELTASONE    90 tablet    Take 1 tablet (5 mg) by mouth daily    Kidney transplanted       TYLENOL 325 MG tablet   Generic drug:  acetaminophen      Take 1-2 tablets by mouth every 6 hours as needed.        * Notice:  This list has 2 medication(s) that are the same as other medications prescribed for you. Read the directions carefully, and ask your doctor or other care provider to review them with you.

## 2018-08-07 NOTE — NURSING NOTE
Chief Complaints and History of Present Illnesses   Patient presents with     Follow Up For     initial visit with Dr. Juarez for SONIA      HPI    Symptoms:              Comments:  Previously seen by Dr. Bharat SCOTT.   No after episode since BHARAT.     Patient requests referral for low vision rehab.    MEKA Doyle 8/7/2018 8:31 AM

## 2018-08-07 NOTE — LETTER
2018         RE:  :  MRN: Jerad Ross  1960  [id number]     Dear Dr. Perry,    Thank you for asking me to see your very pleasant patient, Jerad Ross, in neuro-ophthalmic consultation.  I would like to thank you for sending your records and I have summarized them in the history of present illness.  My assessment and plan are below.  For further details, please see my attached clinic note.      Assessment & Plan     Jerad Ross is a 58 year old male with the following diagnoses:   1. Anterior ischemic optic neuropathy, bilateral    2. Subjective visual disturbance    3. PCO (posterior capsular opacification), bilateral       Initial episode of anterior ischemic optic neuropathy in  in left eye with another episode in  in right eye. Does not drive due to resulting blind spots. No recent vision changes. Denies headaches, jaw claudication, loss of appetite, or significant weight loss. He is interested in ongoing research in AION and would like a referral to our low vision specialists.    On mycophenylate and prednisone 5 mg for history of kidney transplant in . History of ocular hypertension on latanoprost both eyes at bedtime, followed by Dr. Rizzo. S/p cataract surgery both eyes .    Uncorrected visual acuity is 20/250 eccentric in the right eye and 20/30 -2 in the right eye. Color vision is normal in both eyes and intraocular pressures are normal. Extraocular movements are full. There is a relative afferent pupillary defect on the right.  Anterior segment exam with posterior chamber intraocular lens (PCIOL) and PCO both eyes.   Visual fields show stable right central scotoma and left constriction since 2017.    Optic nerve OCT shows stable diffuse retinal nerve fiber layer thinning in both eyes, likely floor effect.    It is my impression that patient is stable Anterior ischemic optic neuropathy (AION) .  Could consider low vision evaluation with Angeles Sanchez and lester  low vision refraction with Dr. Bella.  Patient also has posterior capsular opacity (PCO) of right eye.  Could consider YAG capsulotomy with Dr. Rizzo.      Again, thank you for allowing me to participate in the care of your patient.      Sincerely,    Bradly Juarez MD  Professor, Neuro-Ophthalmology  Department of Ophthalmology and Visual Neurosciences  [facility]    CC: Aston Perry MD  [address]  [place] [address]  VIA In Basket     Viki Rizzo MD  [address]  [place] [address]  VIA In Basket     Oralia Morse PsyD  [address]  [place] [address]  VIA In Basket     Nguyen Santo MD  [address]  [place] [address]  VIA In Basket     Neeraj Atwood MD  VIA In Basket     Filemon Levine MD  [address]  [place] [address]  VIA In Basket     Foster De Guzman MD  [address]  [place] [address]  VIA In Basket     Nolan Lovett MD  [address]  [place] [address]  VIA In Basket

## 2018-08-07 NOTE — LETTER
2018         RE:  :  MRN: Jerad Ross  1960  [id number]     Dear Dr. Aston Perry,    Thank you for asking me to see your very pleasant patient, Jerad Ross, in neuro-ophthalmic consultation.  I would like to thank you for sending your records and I have summarized them in the history of present illness. He presented with his {Family Member:[id number]} who provided additional history.  My assessment and plan are below.  For further details, please see my attached clinic note.          Assessment & Plan     Jerad Ross is a 58 year old male with the following diagnoses:   1. Subjective visual disturbance         Initial episode of anterior ischemic optic neuropathy in  in left eye with another episode in  in right eye. Does not drive due to resulting blind spots. No recent vision changes. Denies headaches, jaw claudication, loss of appetite, or significant weight loss. He is interested in ongoing research in AION and would like a referral to our low vision specialists.    On mycophenylate and prednisone 5 mg for history of kidney transplant in . History of ocular hypertension on latanoprost both eyes at bedtime, followed by Dr. Rizzo. S/p cataract surgery both eyes .    Uncorrected visual acuity is 20/250 eccentric in the right eye and 20/30 -2 in the right eye. Color vision is normal in both eyes and intraocular pressures are normal. Extraocular movements are full. There is a relative afferent pupillary defect on the right.    Visual fields show stable right central scotoma and left constriction since 2017.    Optic nerve OCT shows stable diffuse retinal nerve fiber layer thinning in both eyes, likely floor effect.        Again, thank you for allowing me to participate in the care of your patient.      Sincerely,    Bradly Juarez MD  Professor, Neuro-Ophthalmology  Department of Ophthalmology and Visual Neurosciences  [facility]    CC: Aston  Reji Perry MD  [address]  [place] [address]  VIA In Basket     Viki Rizzo MD  [address]  [facility] [address]  VIA In Basket     Oralai Morse PsyD  [address]  [facility] [address]  VIA In Basket     Nguyen Santo MD  [address]  [facility] [address]  VIA In Basket     Neeraj Atwood MD  VIA In Basket     Filemon Levine MD  [address]  [facility] [address]  VIA In Basket     Foster De Guzman MD  [address]  [facility] [address]  VIA In Basket     Nolan Lovett MD  [address]  [facility] [address]  VIA In Basket     Bradly Juarez MD  [address]  [facility] [address]  VIA In Basket       DX = ***

## 2018-08-09 ENCOUNTER — OFFICE VISIT (OUTPATIENT)
Dept: OPTOMETRY | Facility: CLINIC | Age: 58
End: 2018-08-09
Payer: MEDICARE

## 2018-08-09 ENCOUNTER — HOSPITAL ENCOUNTER (OUTPATIENT)
Dept: OCCUPATIONAL THERAPY | Facility: CLINIC | Age: 58
Discharge: HOME | End: 2018-08-09
Attending: OCCUPATIONAL THERAPIST | Admitting: OCCUPATIONAL THERAPIST
Payer: MEDICARE

## 2018-08-09 DIAGNOSIS — H47.013 ACUTE ISCHEMIC OPTIC NEUROPATHY OF BOTH EYES: ICD-10-CM

## 2018-08-09 DIAGNOSIS — H54.512A LOW VISION RIGHT EYE CATEGORY 2, NORMAL VISION LEFT EYE: Primary | ICD-10-CM

## 2018-08-09 DIAGNOSIS — H52.4 PRESBYOPIA: ICD-10-CM

## 2018-08-09 PROCEDURE — G8988 SELF CARE GOAL STATUS: HCPCS | Mod: GO,CI | Performed by: OCCUPATIONAL THERAPIST

## 2018-08-09 PROCEDURE — 97165 OT EVAL LOW COMPLEX 30 MIN: CPT | Mod: GO | Performed by: OCCUPATIONAL THERAPIST

## 2018-08-09 PROCEDURE — G8987 SELF CARE CURRENT STATUS: HCPCS | Mod: GO,CI | Performed by: OCCUPATIONAL THERAPIST

## 2018-08-09 PROCEDURE — 40000249 ZZH STATISTIC VISIT LOW VISION CLINIC: Performed by: OCCUPATIONAL THERAPIST

## 2018-08-09 PROCEDURE — 97535 SELF CARE MNGMENT TRAINING: CPT | Mod: GO | Performed by: OCCUPATIONAL THERAPIST

## 2018-08-09 ASSESSMENT — CUP TO DISC RATIO
OD_RATIO: 0.2
OS_RATIO: 0.65

## 2018-08-09 ASSESSMENT — ACTIVITIES OF DAILY LIVING (ADL): PRIOR_ADL/IADL_STATUS: INDEPENDENT PRIOR TO ONSET

## 2018-08-09 ASSESSMENT — VISUAL ACUITY
OS_SC+: +1
OS_CC: J2
OS: 20/30-2
OD_SC: 20/300ECC
OD: 20/200
METHOD: SNELLEN - LINEAR
OS_SC: 20/40

## 2018-08-09 ASSESSMENT — REFRACTION_MANIFEST
OS_CYLINDER: +2.25
OD_SPHERE: BALANCE
OS_AXIS: 170
OS_SPHERE: -0.50

## 2018-08-09 ASSESSMENT — EXTERNAL EXAM - LEFT EYE: OS_EXAM: NORMAL

## 2018-08-09 ASSESSMENT — TONOMETRY
OD_IOP_MMHG: 20
IOP_METHOD: ICARE
OS_IOP_MMHG: 20

## 2018-08-09 ASSESSMENT — EXTERNAL EXAM - RIGHT EYE: OD_EXAM: NORMAL

## 2018-08-09 ASSESSMENT — CONF VISUAL FIELD
OD_NORMAL: 1
OS_NORMAL: 1

## 2018-08-09 ASSESSMENT — SLIT LAMP EXAM - LIDS
COMMENTS: NORMAL
COMMENTS: NORMAL

## 2018-08-09 NOTE — PROGRESS NOTES
Assessment/Plan  (H54.512A) Low vision right eye category 2, normal vision left eye  (primary encounter diagnosis)  Comment: BCVA 20/250 OD, 20/25 OS  Plan: Discussed findings with patient. Recommended patient consider filling Rx for driving at least, as this does improve his vision from about 20/40 to 20/25. Given patient's interest in returning to driving, will order a binocular Goldmann visual field to see if he could feasibly qualify for a [place] 's license. Advised patient that with his ocular condition a formal driving evaluation would be recommended before this provider would sign off on a 's license. Patient agreed to this plan and will RTC in about 2 weeks for that additional testing. Patient should continue care with Angeles Sanchez OT.     (H47.013) Acute ischemic optic neuropathy of both eyes  Plan: Continue care with Dr. Juarez. Patient should RTC with any vision changes- particularly any painful vision loss or dimming of vision.     (H52.4) Presbyopia  Plan: SRx updated and released. Patient advised that adaptation to new lenses may be difficult, so he might consider trying to adapt to single-vision distance lenses first before investing in a bifocal or PAL.       Complete documentation of historical and exam elements from today's encounter can  be found in the full encounter summary report (not reduplicated in this progress  note). I personally obtained the chief complaint(s) and history of present illness. I  confirmed and edited as necessary the review of systems, past medical/surgical  history, family history, social history, and examination findings as documented by  others; and I examined the patient myself. I personally reviewed the relevant tests,  images, and reports as documented above. I formulated and edited as necessary the  assessment and plan and discussed the findings and management plan with the  patient and family.    Kenneth Bella, OD

## 2018-08-09 NOTE — DISCHARGE INSTRUCTIONS
Visual Rehabilitation Summary   Goals:    1. Identify optimal devices for reading continuous text.  2. Identify device for  spot  reading  3. Identify sunglasses for optimal clarity  4. Identify optimal settings for electronics  5. Listen to Sabino Breen  [place] [place]   Angeles Sanchez, OTR/L  [phone]

## 2018-08-09 NOTE — PROGRESS NOTES
[facility]          OUTPATIENT OCCUPATIONALTHERAPY  EVALUATION  PLAN OF TREATMENT FOR OUTPATIENT REHABILITATION  (COMPLETE FOR INITIAL CLAIMS ONLY)  Patient's Last Name, First Name, M.I.  YOB: 1960  Jerad Ross      Provider's Name  [facility] Medical Record No.  [id number]   Onset Date:  08/09/2018 Start of Care Date:  08/09/18   Type:     ___PT  _X_OT   ___SLP Medical Diagnosis:  AION both eyes   Therapy Diagnosis: Impaired ADL/IADL with deficits in Reading based ADL, Home management (glare and light management)    Visits from SOC: 1     _________________________________________________________________________________  Plan of Treatment/Functional Goals:  Planned Interventions: Visual field loss awareness, Visual skills training for safety in mobility, Low vision compensatory training for reading, Instruction in environmental adaptations for contrast, Instruction in environmental adaptations for lighting, Optical device/ADL device instruction and training, Computer modifications, Instruction in community resources        Goals  1. Patient will verbalize awareness of visual field Loss and demonstrate improved use of visual skills/adaptive equipment for increased independence in reading-based activities of daily living, written communication and detail ADL tasks.         Target Date: 10/11/18      2. Patient will verbalize awareness of visual field loss and demonstrate improved use of visual skills for increased safety/ independence in locating objects/obstacles and in navigation         Target Date: 10/11/18      3.  Patient will demonstrate understanding of the impact of lighting, contrast and glare on ADL activities, in conjunction with environmentally-based ADL modifications         Target Date: 10/11/18      4. Patient will verbalize awareness of community  resources for the following:, Audio access to print materials, Access to large print materials, Access to low vision devices, Support in vocational goals         Target Date: 10/11/18      5.                   6.                    7.                 8.                            Therapy Frequency/Duration:  3 tx visits over 9 weeks - 1X every 3 weeks for 9 weeks    Angeles Sanchez, OT       I CERTIFY THE NEED FOR THESE SERVICES FURNISHED UNDER        THIS PLAN OF TREATMENT AND WHILE UNDER MY CARE     (Physician co-signature of this document indicates review and certification of the therapy plan).                  Certification date from: 08/09/18 Certification date to: 10/11/18          Referring Physician: Bradly Juarez MD     Initial Assessment        See Epic Evaluation Start Of Care Date: 08/09/18     Angeles Sanchez

## 2018-08-09 NOTE — LETTER
8/9/2018      RE: Jerad Ross  [address]  [facility] [address]       Assessment/Plan  (H54.512A) Low vision right eye category 2, normal vision left eye  (primary encounter diagnosis)  Comment: BCVA 20/250 OD, 20/25 OS  Plan: Discussed findings with patient. Recommended patient consider filling Rx for driving at least, as this does improve his vision from about 20/40 to 20/25. Given patient's interest in returning to driving, will order a binocular Goldmann visual field to see if he could feasibly qualify for a [place] 's license. Advised patient that with his ocular condition a formal driving evaluation would be recommended before this provider would sign off on a 's license. Patient agreed to this plan and will RTC in about 2 weeks for that additional testing. Patient should continue care with Angeles Sanchez OT.     (H47.013) Acute ischemic optic neuropathy of both eyes  Plan: Continue care with Dr. Juarez. Patient should RTC with any vision changes- particularly any painful vision loss or dimming of vision.     (H52.4) Presbyopia  Plan: SRx updated and released. Patient advised that adaptation to new lenses may be difficult, so he might consider trying to adapt to single-vision distance lenses first before investing in a bifocal or PAL.       Complete documentation of historical and exam elements from today's encounter can  be found in the full encounter summary report (not reduplicated in this progress  note). I personally obtained the chief complaint(s) and history of present illness. I  confirmed and edited as necessary the review of systems, past medical/surgical  history, family history, social history, and examination findings as documented by  others; and I examined the patient myself. I personally reviewed the relevant tests,  images, and reports as documented above. I formulated and edited as necessary the  assessment and plan and discussed the findings and management plan with  the  patient and family.    Kenneth Bella, RASHAUN Bella, OD

## 2018-08-09 NOTE — MR AVS SNAPSHOT
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      Your next 10 appointments already scheduled     Aug 22, 2018 10:20 AM CDT   (Arrive by 10:05 AM)   Return Low Vision with Kenneth Bella, RASHAUN   [facility] Ophthalmology ([facility])    [address] [place] [address]  4th [facility] [address] [phone]            Aug 30, 2018 11:00 AM CDT   Treatment 60 with Angeles Sanchez, OT   [facility] [place], Occupational Therapy, Vision - Outpatie ([facility])    [address] [place] [address]  [address]th [facility] [address]a  [facility] [address]   [phone]            Oct 23, 2018 10:20 AM CDT   (Arrive by 10:05 AM)   Return Visit with Aston Perry MD   [facility] Primary Care Clinic ([facility])    [address] [place] [address]  4th [facility] [address]   [phone]            Nov 02, 2018  9:30 AM CDT   RETURN GLAUCOMA with Viki Rizzo MD   Eye Clinic ([facility])    [facility]  [address] [place] [address]  9[facility] Clin 9a  [facility] [address]   [phone]            Dec 11, 2018  1:55 PM CST   (Arrive by 1:25 PM)   Return Kidney Transplant with  Kidney/Pancreas Recipient   [facility] Nephrology ([facility])    [address] [place] [address]  [address]  [facility] [address]   [phone]            Dec 13, 2018 10:30 AM CST   (Arrive by 10:15 AM)    Return Visit with Sisi Lockwood MD   [facility] ([facility])    [address]  [address]  [facility] [id number] [phone]            Dec 13, 2018 12:30 PM CST   Adult Med Follow UP with RONALDO Dempsey CNP   Psychiatry Clinic ([facility])    [address]  [address] [place] [address] [phone]              Who to contact     Please call your clinic at [phone] to:    Ask questions about your health    Make or cancel appointments    Discuss your medicines    Learn about your test results    Speak to your doctor            Additional Information About Your Visit        [employer] Information     [employer] gives you secure access to your electronic health record. If you see a primary care provider, you can also send messages to your care team and make appointments. If you have questions, please call your primary care clinic.  If you do not have a primary care provider, please call [phone] and they will assist you.      [employer] is an electronic gateway that provides easy, online access to your medical records. With [employer], you can request a clinic appointment, read your test results, renew a prescription or communicate with your care team.     To access your existing account, please contact your [facility] or call [phone] for assistance.        Care EveryWhere ID     This is your Care EveryWhere ID. This could be used by other organizations to access your [place] medical records  [id number]         Blood Pressure from Last 3 Encounters:   06/12/18 113/77   06/07/18 117/76   05/16/18 119/80    Weight from Last 3 Encounters:   06/12/18 76.5 kg (168 lb 9.6 oz)   06/07/18 77.6 kg (171 lb)   05/16/18 76.9 kg (169 lb 9.6 oz)              We Performed the Following     REFRACTION [[id number]]        Primary Care Provider Office Phone # Fax #     Aston Perry [phone]       [address] [place] [address]        Equal Access to Services     CARLOS MENDOZA : Hadii aad ku hadkelleykenny Alarcon, warekha morochogeneha, ervin stephaniejackie crareon, nish nicoin hayaan vonezio holloway yajaira salamanca. So [facility] [phone].    ATENCIÓN: Si habla español, tiene a sanchez disposición servicios gratuitos de asistencia lingüística. Llame al [phone].    We comply with applicable federal civil rights laws and [place] laws. We do not discriminate on the basis of race, color, national origin, age, disability, sex, sexual orientation, or gender identity.            Thank you!     Thank you for choosing EYE CLINIC  for your care. Our goal is always to provide you with excellent care. Hearing back from our patients is one way we can continue to improve our services. Please take a few minutes to complete the written survey that you may receive in the mail after your visit with us. Thank you!             Your Updated Medication List - Protect others around you: Learn how to safely use, store and throw away your medicines at www.disposemymeds.org.          This list is accurate as of 8/9/18  3:39 PM.  Always use your most recent med list.                   Brand Name Dispense Instructions for use Diagnosis    albuterol 108 (90 Base) MCG/ACT Inhaler    PROAIR HFA    1 Inhaler    Inhale 2 puffs into the lungs every 6 hours as needed for shortness of breath / dyspnea or wheezing    Cough, Wheezing       amLODIPine 5 MG tablet    NORVASC    90 tablet    Take 1 tablet (5 mg) by mouth daily    Acute chest pain       aspirin 81 MG tablet      Take by mouth daily        atorvastatin 20 MG tablet    LIPITOR    90 tablet    Take 1 tablet (20 mg) by mouth daily    NSTEMI (non-ST elevated myocardial infarction) (H)       BETA BLOCKER NOT PRESCRIBED (INTENTIONAL)      Beta Blocker not prescribed intentionally due to Bradycardia < 50 bpm without beta blocker therapy     NSTEMI (non-ST elevated myocardial infarction) (H)       BUDESONIDE (INHALATION) 90 MCG/ACT Aepb     1 each    Inhale 2 puffs into the lungs 2 times daily    Asthma       calcium citrate-vitamin D 315-200 MG-UNIT Tabs per tablet    CITRACAL     Take 1 tablet by mouth daily.        clopidogrel 75 MG tablet    PLAVIX    90 tablet    Take 1 tablet (75 mg) by mouth daily    NSTEMI (non-ST elevated myocardial infarction) (H), Coronary artery disease involving native coronary artery of native heart without angina pectoris       entecavir 0.5 MG tablet    BARACLUDE    90 tablet    Take 1 tablet (0.5 mg) by mouth daily    Chronic viral hepatitis B without delta agent and without coma (H), Chronic hepatitis B (H)       FLUoxetine 40 MG capsule    PROzac    90 capsule    Take 1 capsule (40 mg) by mouth every morning    Major depressive disorder, recurrent episode, moderate (H)       fluticasone 50 MCG/ACT spray    FLONASE    3 Bottle    Spray 1-2 sprays into both nostrils daily    Bronchitis with bronchospasm       fluticasone-salmeterol 250-50 MCG/DOSE diskus inhaler    ADVAIR    60 Inhaler    Inhale 1 puff into the lungs every 12 hours    Cough       ibuprofen 200 MG tablet    ADVIL/MOTRIN     Take 200 mg by mouth every 4 hours as needed for mild pain    Bronchitis, Essential hypertension, Coronary artery disease involving native heart without angina pectoris, unspecified vessel or lesion type       isosorbide mononitrate 30 MG 24 hr tablet    IMDUR    45 tablet    Take 0.5 tablets (15 mg) by mouth daily    Acute chest pain       latanoprost 0.005 % ophthalmic solution    XALATAN    3 Bottle    Place 1 drop into both eyes At Bedtime    Borderline glaucoma with ocular hypertension, bilateral       losartan 50 MG tablet    COZAAR    90 tablet    Take 0.5 tablets (25 mg) by mouth daily    Hypertension, unspecified type       * montelukast 10 MG tablet    SINGULAIR    30 tablet    Take 1 tablet (10 mg) by mouth At Bedtime     Cough       * montelukast 10 MG tablet    SINGULAIR    30 tablet    Take 1 tablet (10 mg) by mouth At Bedtime    Bronchitis with bronchospasm       MULTIVITAMIN/IRON PO      Take 1 tablet by mouth daily        mycophenolate 250 MG capsule    GENERIC EQUIVALENT    180 capsule    Take 3 capsules (750 mg) by mouth 2 times daily    Kidney transplanted       OMEGA 3 PO      Take 1 capsule by mouth daily        omeprazole 20 MG tablet    priLOSEC OTC    30 tablet    Take  by mouth daily.    Chronic hepatitis B (H), HTN (hypertension), Depression, Unspecified essential hypertension       predniSONE 5 MG tablet    DELTASONE    90 tablet    Take 1 tablet (5 mg) by mouth daily    Kidney transplanted       TYLENOL 325 MG tablet   Generic drug:  acetaminophen      Take 1-2 tablets by mouth every 6 hours as needed.        * Notice:  This list has 2 medication(s) that are the same as other medications prescribed for you. Read the directions carefully, and ask your doctor or other care provider to review them with you.

## 2018-08-15 ENCOUNTER — OFFICE VISIT (OUTPATIENT)
Dept: INTERNAL MEDICINE | Facility: CLINIC | Age: 58
End: 2018-08-15
Payer: MEDICARE

## 2018-08-15 VITALS
BODY MASS INDEX: 25.7 KG/M2 | DIASTOLIC BLOOD PRESSURE: 82 MMHG | RESPIRATION RATE: 18 BRPM | HEART RATE: 75 BPM | TEMPERATURE: 98.5 F | SYSTOLIC BLOOD PRESSURE: 137 MMHG | WEIGHT: 169 LBS

## 2018-08-15 DIAGNOSIS — B02.9 HERPES ZOSTER WITHOUT COMPLICATION: Primary | ICD-10-CM

## 2018-08-15 RX ORDER — VALACYCLOVIR HYDROCHLORIDE 1 G/1
1000 TABLET, FILM COATED ORAL 3 TIMES DAILY
Qty: 21 TABLET | Refills: 0 | Status: SHIPPED | OUTPATIENT
Start: 2018-08-15 | End: 2018-11-04

## 2018-08-15 ASSESSMENT — PAIN SCALES - GENERAL: PAINLEVEL: MILD PAIN (3)

## 2018-08-15 NOTE — MR AVS SNAPSHOT
After Visit Summary   8/15/2018    Jerad Ross    MRN: [id number]           Patient Information     Date Of Birth          1960        Visit Information        Provider Department      8/15/2018 12:00 PM Sarahy Lugo MD [facility]        Today's Diagnoses     Herpes zoster without complication    -  1       Follow-ups after your visit        Your next 10 appointments already scheduled     Aug 22, 2018 10:20 AM CDT   (Arrive by 10:05 AM)   Return Low Vision with Kenneth Bella OD   [facility] Ophthalmology ([facility])    [address] [facility]  4th [facility] [address]   [phone]            Aug 30, 2018 11:00 AM CDT   Treatment 60 with Angeles Sanchez OT   [facility], [place], Occupational Therapy, Vision - Outpatie ([facility])    [address] [place] [address]  9th [facility] Clinic 9a  [facility] [address]   [phone]            Oct 23, 2018 10:20 AM CDT   (Arrive by 10:05 AM)   Return Visit with Aston Perry MD   [facility] Primary Care Clinic ([facility])    [address] [facility]  4th Floor  [facility] [address]   [phone]            Nov 02, 2018  9:30 AM CDT   RETURN GLAUCOMA with Viki Rizzo MD   Eye Clinic ([facility])    [address]  [address] Clin 9a  [facility] [address]   [phone]            Dec 11, 2018  1:55 PM CST   (Arrive by 1:25 PM)   Return Kidney Transplant with Uc Kidney/Pancreas Recipient   [facility] Nephrology ([facility])    [address] [facility]  [address]  [facility] [address]   [phone]            Dec 13, 2018 10:30 AM CST   (Arrive by 10:15 AM)   Return Visit with Sisi Lockwood MD   [facility] ([facility])    [address] [place] [address]  [address]  [facility]  [address]   [phone]            Dec 13, 2018 12:30 PM CST   Adult Med Follow UP with RONALDO Dempsey CNP   Psychiatry Clinic ([facility])    [address]  [address] [address] [phone]              Who to contact     Please call your clinic at [phone] to:    Ask questions about your health    Make or cancel appointments    Discuss your medicines    Learn about your test results    Speak to your doctor            Additional Information About Your Visit        [employer] Information     [employer] gives you secure access to your electronic health record. If you see a primary care provider, you can also send messages to your care team and make appointments. If you have questions, please call your primary care clinic.  If you do not have a primary care provider, please call [phone] and they will assist you.      [employer] is an electronic gateway that provides easy, online access to your medical records. With [employer], you can request a clinic appointment, read your test results, renew a prescription or communicate with your care team.     To access your existing account, please contact your [facility] or call [phone] for assistance.        Care EveryWhere ID     This is your Care EveryWhere ID. This could be used by other organizations to access your [place] medical records  [id number]        Your Vitals Were     Pulse Temperature Respirations BMI (Body Mass Index)          75 98.5  F (36.9  C) (Oral) 18 25.7 kg/m2         Blood Pressure from Last 3 Encounters:   08/15/18 137/82   06/12/18 113/77   06/07/18 117/76    Weight from Last 3 Encounters:   08/15/18 76.7 kg (169 lb)   06/12/18 76.5 kg (168 lb 9.6 oz)   06/07/18 77.6 kg (171 lb)              Today, you had the following     No orders found for display         Today's Medication Changes          These changes are accurate as of 8/15/18 12:16 PM.   If you have any questions, ask your nurse or doctor.               Start taking these medicines.        Dose/Directions    valACYclovir 1000 mg tablet   Commonly known as:  VALTREX   Used for:  Herpes zoster without complication   Started by:  Sarahy Lugo MD        Dose:  1000 mg   Take 1 tablet (1,000 mg) by mouth 3 times daily   Quantity:  21 tablet   Refills:  0            Where to get your medicines      These medications were sent to [facility] [place] - [address]  [address], [facility] [address]    Hours:  TRANSPLANT PHONE NUMBER [phone] Phone:  [phone]     valACYclovir 1000 mg tablet                Primary Care Provider Office Phone # Fax #    Aston Perry [phone]       [address] [place] [address] [address]  [facility] [address]        Equal Access to Services     CARLOS MENDOZA : Hadii yuli ferrara hadasho Soomaali, waaxda luqadaha, qaybta kaalmada adeegyada, nish lyman haytemi gates . So [facility] [phone].    ATENCIÓN: Si habla español, tiene a sanchez disposición servicios gratuitos de asistencia lingüística. Llame al [phone].    We comply with applicable federal civil rights laws and [place] laws. We do not discriminate on the basis of race, color, national origin, age, disability, sex, sexual orientation, or gender identity.            Thank you!     Thank you for choosing [facility] PRIMARY CARE CLINIC  for your care. Our goal is always to provide you with excellent care. Hearing back from our patients is one way we can continue to improve our services. Please take a few minutes to complete the written survey that you may receive in the mail after your visit with us. Thank you!             Your Updated Medication List - Protect others around you: Learn how to safely use, store and throw away your medicines at www.disposemymeds.org.          This list is accurate as of 8/15/18 12:16  PM.  Always use your most recent med list.                   Brand Name Dispense Instructions for use Diagnosis    albuterol 108 (90 Base) MCG/ACT inhaler    PROAIR HFA    1 Inhaler    Inhale 2 puffs into the lungs every 6 hours as needed for shortness of breath / dyspnea or wheezing    Cough, Wheezing       amLODIPine 5 MG tablet    NORVASC    90 tablet    Take 1 tablet (5 mg) by mouth daily    Acute chest pain       aspirin 81 MG tablet      Take by mouth daily        atorvastatin 20 MG tablet    LIPITOR    90 tablet    Take 1 tablet (20 mg) by mouth daily    NSTEMI (non-ST elevated myocardial infarction) (H)       BETA BLOCKER NOT PRESCRIBED (INTENTIONAL)      Beta Blocker not prescribed intentionally due to Bradycardia < 50 bpm without beta blocker therapy    NSTEMI (non-ST elevated myocardial infarction) (H)       BUDESONIDE (INHALATION) 90 MCG/ACT Aepb     1 each    Inhale 2 puffs into the lungs 2 times daily    Asthma       calcium citrate-vitamin D 315-200 MG-UNIT Tabs per tablet    CITRACAL     Take 1 tablet by mouth daily.        clopidogrel 75 MG tablet    PLAVIX    90 tablet    Take 1 tablet (75 mg) by mouth daily    NSTEMI (non-ST elevated myocardial infarction) (H), Coronary artery disease involving native coronary artery of native heart without angina pectoris       entecavir 0.5 MG tablet    BARACLUDE    90 tablet    Take 1 tablet (0.5 mg) by mouth daily    Chronic viral hepatitis B without delta agent and without coma (H), Chronic hepatitis B (H)       FLUoxetine 40 MG capsule    PROzac    90 capsule    Take 1 capsule (40 mg) by mouth every morning    Major depressive disorder, recurrent episode, moderate (H)       fluticasone 50 MCG/ACT spray    FLONASE    3 Bottle    Spray 1-2 sprays into both nostrils daily    Bronchitis with bronchospasm       fluticasone-salmeterol 250-50 MCG/DOSE diskus inhaler    ADVAIR    60 Inhaler    Inhale 1 puff into the lungs every 12 hours    Cough       ibuprofen 200  MG tablet    ADVIL/MOTRIN     Take 200 mg by mouth every 4 hours as needed for mild pain    Bronchitis, Essential hypertension, Coronary artery disease involving native heart without angina pectoris, unspecified vessel or lesion type       isosorbide mononitrate 30 MG 24 hr tablet    IMDUR    45 tablet    Take 0.5 tablets (15 mg) by mouth daily    Acute chest pain       latanoprost 0.005 % ophthalmic solution    XALATAN    3 Bottle    Place 1 drop into both eyes At Bedtime    Borderline glaucoma with ocular hypertension, bilateral       losartan 50 MG tablet    COZAAR    90 tablet    Take 0.5 tablets (25 mg) by mouth daily    Hypertension, unspecified type       * montelukast 10 MG tablet    SINGULAIR    30 tablet    Take 1 tablet (10 mg) by mouth At Bedtime    Cough       * montelukast 10 MG tablet    SINGULAIR    30 tablet    Take 1 tablet (10 mg) by mouth At Bedtime    Bronchitis with bronchospasm       MULTIVITAMIN/IRON PO      Take 1 tablet by mouth daily        mycophenolate 250 MG capsule    GENERIC EQUIVALENT    180 capsule    Take 3 capsules (750 mg) by mouth 2 times daily    Kidney transplanted       OMEGA 3 PO      Take 1 capsule by mouth daily        omeprazole 20 MG tablet    priLOSEC OTC    30 tablet    Take  by mouth daily.    Chronic hepatitis B (H), HTN (hypertension), Depression, Unspecified essential hypertension       predniSONE 5 MG tablet    DELTASONE    90 tablet    Take 1 tablet (5 mg) by mouth daily    Kidney transplanted       TYLENOL 325 MG tablet   Generic drug:  acetaminophen      Take 1-2 tablets by mouth every 6 hours as needed.        valACYclovir 1000 mg tablet    VALTREX    21 tablet    Take 1 tablet (1,000 mg) by mouth 3 times daily    Herpes zoster without complication       * Notice:  This list has 2 medication(s) that are the same as other medications prescribed for you. Read the directions carefully, and ask your doctor or other care provider to review them with you.

## 2018-08-15 NOTE — NURSING NOTE
Chief Complaint   Patient presents with     Shingles     Patient is here for possible shingles; right side abdomen       Umair Hawkins CMA ([facility]) at 11:56 AM on 8/15/2018

## 2018-08-16 ENCOUNTER — TELEPHONE (OUTPATIENT)
Dept: OPHTHALMOLOGY | Facility: CLINIC | Age: 58
End: 2018-08-16

## 2018-08-16 NOTE — TELEPHONE ENCOUNTER
In regards to the inbasket received, I called the patient but there was no answer. I left a voicemail leaving a call back number of [phone].  I verified with Dr. Bella, and we will only need to schedule one appointment with him for the GVF and follow-up.

## 2018-08-20 NOTE — PROGRESS NOTES
Jerad was seen today for possible shingles.  He is a 58 year old male with  has a past medical history of AION (acute ischemic optic neuropathy); Anemia in chronic renal disease; Avascular necrosis of bones of both hips (H); Basal cell carcinoma; Chronic hepatitis B (H); Clostridium difficile colitis; Coronary artery disease involving native coronary artery of native heart without angina pectoris (6/17/2014); CRP elevated; Depression; Diverticulosis; Dyslipidemia; FSGS (focal segmental glomerulosclerosis); Gastric ulcer with hemorrhage (2/12/12); GERD (gastroesophageal reflux disease); Glaucoma; Hemorrhoids; Hypertension secondary to other renal disorders; Hypogonadism in male; Immunosuppressed status (H); Kidney replaced by transplant; NSTEMI (non-ST elevated myocardial infarction) (H) (6/17/2014); JULIANE (obstructive sleep apnea); Paracentral scotoma; Secondary renal hyperparathyroidism (H); and Squamous cell carcinoma.     He had a burning sensation and tingling in the right flank and chest area, then it was followed by red spots with fluid filled blisters.  He thought it might be shingles so he called to make this appointment.  Currently there is very minimal pain, just the rash.  No other recent illness or new medications.  He is immune suppressed since he is s/p kidney transplant.    On exam  B/P: 137/82, T: 98.5, P: 75, R: 18  Skin:  Fluid filled vesicles on an erythematous base in a dermatomal distribution, right chest.  Several lesions are crusted over.    A/P    Herpes zoster without complication  -     valACYclovir (VALTREX) 1000 mg tablet; Take 1 tablet (1,000 mg) by mouth 3 times daily   --advised to keep covered and avoid close contact with others  --currently not in need of any pain management, discussed potential use of steroids vs gabapentin but he is cautious due to his transplant status to take any new medications   --RTC prn    Sarahy Claros MD

## 2018-09-04 ENCOUNTER — OFFICE VISIT (OUTPATIENT)
Dept: OPTOMETRY | Facility: CLINIC | Age: 58
End: 2018-09-04
Payer: MEDICARE

## 2018-09-04 DIAGNOSIS — H53.451 PERIPHERAL VISUAL FIELD DEFECT OF RIGHT EYE: Primary | ICD-10-CM

## 2018-09-04 NOTE — PROGRESS NOTES
Assessment/Plan  (H53.451) Peripheral visual field defect of right eye  (primary encounter diagnosis)  Plan: Kat VF DMV OU        Adequate peripheral vision binocularly for patient to attend a behind the wheel driving evaluation. Recommend patient wear distance glasses for best acuity during that evaluation. Patient has upcoming low vision OT exam to further discuss scheduling process.       Complete documentation of historical and exam elements from today's encounter can  be found in the full encounter summary report (not reduplicated in this progress  note). I personally obtained the chief complaint(s) and history of present illness. I  confirmed and edited as necessary the review of systems, past medical/surgical  history, family history, social history, and examination findings as documented by  others; and I examined the patient myself. I personally reviewed the relevant tests,  images, and reports as documented above. I formulated and edited as necessary the  assessment and plan and discussed the findings and management plan with the  patient and family.    Kenneth Bella, OD

## 2018-09-04 NOTE — MR AVS SNAPSHOT
After Visit Summary   9/4/2018    Jerad Ross    MRN: [id number]           Patient Information     Date Of Birth          1960        Visit Information        Provider Department      9/4/2018 2:00 PM Kenneth Bella, OD [facility]        Today's Diagnoses     Peripheral visual field defect of right eye    -  1       Follow-ups after your visit        Your next 10 appointments already scheduled     Sep 06, 2018  1:00 PM CDT   Treatment 60 with Angeles Sanchez OT   [facility] [place], Occupational Therapy, Vision - Outpatie ([facility] [place])    [address]  9th Floor, Clinic 9a  [facility] [address]   [phone]            Oct 23, 2018 10:20 AM CDT   (Arrive by 10:05 AM)   Return Visit with Aston Perry MD   [facility] ([facility] [place])    [address]  4th Floor  [facility] [address] [phone]            Nov 02, 2018  9:30 AM CDT   RETURN GLAUCOMA with Viki Rizzo MD   Eye Clinic ([facility])    [address]  [address] Clin [address]a  [facility] [address]   [phone]            Dec 11, 2018  2:05 PM CST   (Arrive by 1:35 PM)   Return Kidney Transplant with  Kidney/Pancreas Recipient   [facility] Nephrology ([facility])    [address]  [address]  [facility] [address] [phone]            Dec 13, 2018 10:30 AM CST   (Arrive by 10:15 AM)   Return Visit with Sisi Lockwood MD   [facility] for Lung Science and Health ([facility])    [address]  [address]  [facility] [address] [phone]            Dec 13, 2018 12:30 PM CST   Adult Med Follow UP with RONALDO Dempsey CNP   Psychiatry Clinic ([facility])    [place] [id number]  [facility] [id number] [phone]               Who to contact     Please call your clinic at [phone] to:    Ask questions about your health    Make or cancel appointments    Discuss your medicines    Learn about your test results    Speak to your doctor            Additional Information About Your Visit        [employer] Information     [facility] gives you secure access to your electronic health record. If you see a primary care provider, you can also send messages to your care team and make appointments. If you have questions, please call your primary care clinic.  If you do not have a primary care provider, please call [phone] and they will assist you.      ZEB is an electronic gateway that provides easy, online access to your medical records. With [facility]EB, you can request a clinic appointment, read your test results, renew a prescription or communicate with your care team.     To access your existing account, please contact your [facility] or call [phone] for assistance.        Care EveryWhere ID     This is your Care EveryWhere ID. This could be used by other organizations to access your [place] medical records  [id number]         Blood Pressure from Last 3 Encounters:   08/15/18 137/82   06/12/18 113/77   06/07/18 117/76    Weight from Last 3 Encounters:   08/15/18 76.7 kg (169 lb)   06/12/18 76.5 kg (168 lb 9.6 oz)   06/07/18 77.6 kg (171 lb)              We Performed the Following     [place] [facility]        Primary Care Provider Office Phone # Fax #    Aston Reji Perry [phone]       [address] [place] [address]        Equal Access to Services     CARLOS MENDOZA AH: Hadii aad ku hadasho Soomaali, waaxda luqadaha, qaybta kaalmada adeegyada, nish salamanca. So [facility] [phone].    ATENCIÓN: Si habla español, tiene a sanchez disposición servicios gratuitos de asistencia lingüística. Llame al [phone].    We comply with applicable  federal civil rights laws and [place] laws. We do not discriminate on the basis of race, color, national origin, age, disability, sex, sexual orientation, or gender identity.            Thank you!     Thank you for choosing EYE CLINIC  for your care. Our goal is always to provide you with excellent care. Hearing back from our patients is one way we can continue to improve our services. Please take a few minutes to complete the written survey that you may receive in the mail after your visit with us. Thank you!             Your Updated Medication List - Protect others around you: Learn how to safely use, store and throw away your medicines at www.disposemymeds.org.          This list is accurate as of 9/4/18  2:57 PM.  Always use your most recent med list.                   Brand Name Dispense Instructions for use Diagnosis    albuterol 108 (90 Base) MCG/ACT inhaler    PROAIR HFA    1 Inhaler    Inhale 2 puffs into the lungs every 6 hours as needed for shortness of breath / dyspnea or wheezing    Cough, Wheezing       amLODIPine 5 MG tablet    NORVASC    90 tablet    Take 1 tablet (5 mg) by mouth daily    Acute chest pain       aspirin 81 MG tablet      Take by mouth daily        atorvastatin 20 MG tablet    LIPITOR    90 tablet    Take 1 tablet (20 mg) by mouth daily    NSTEMI (non-ST elevated myocardial infarction) (H)       BETA BLOCKER NOT PRESCRIBED (INTENTIONAL)      Beta Blocker not prescribed intentionally due to Bradycardia < 50 bpm without beta blocker therapy    NSTEMI (non-ST elevated myocardial infarction) (H)       BUDESONIDE (INHALATION) 90 MCG/ACT Aepb     1 each    Inhale 2 puffs into the lungs 2 times daily    Asthma       calcium citrate-vitamin D 315-200 MG-UNIT Tabs per tablet    CITRACAL     Take 1 tablet by mouth daily.        clopidogrel 75 MG tablet    PLAVIX    90 tablet    Take 1 tablet (75 mg) by mouth daily    NSTEMI (non-ST elevated myocardial infarction) (H), Coronary artery disease  involving native coronary artery of native heart without angina pectoris       entecavir 0.5 MG tablet    BARACLUDE    90 tablet    Take 1 tablet (0.5 mg) by mouth daily    Chronic viral hepatitis B without delta agent and without coma (H), Chronic hepatitis B (H)       FLUoxetine 40 MG capsule    PROzac    90 capsule    Take 1 capsule (40 mg) by mouth every morning    Major depressive disorder, recurrent episode, moderate (H)       fluticasone 50 MCG/ACT spray    FLONASE    3 Bottle    Spray 1-2 sprays into both nostrils daily    Bronchitis with bronchospasm       fluticasone-salmeterol 250-50 MCG/DOSE diskus inhaler    ADVAIR    60 Inhaler    Inhale 1 puff into the lungs every 12 hours    Cough       ibuprofen 200 MG tablet    ADVIL/MOTRIN     Take 200 mg by mouth every 4 hours as needed for mild pain    Bronchitis, Essential hypertension, Coronary artery disease involving native heart without angina pectoris, unspecified vessel or lesion type       isosorbide mononitrate 30 MG 24 hr tablet    IMDUR    45 tablet    Take 0.5 tablets (15 mg) by mouth daily    Acute chest pain       latanoprost 0.005 % ophthalmic solution    XALATAN    3 Bottle    Place 1 drop into both eyes At Bedtime    Borderline glaucoma with ocular hypertension, bilateral       losartan 50 MG tablet    COZAAR    90 tablet    Take 0.5 tablets (25 mg) by mouth daily    Hypertension, unspecified type       * montelukast 10 MG tablet    SINGULAIR    30 tablet    Take 1 tablet (10 mg) by mouth At Bedtime    Cough       * montelukast 10 MG tablet    SINGULAIR    30 tablet    Take 1 tablet (10 mg) by mouth At Bedtime    Bronchitis with bronchospasm       MULTIVITAMIN/IRON PO      Take 1 tablet by mouth daily        mycophenolate 250 MG capsule    GENERIC EQUIVALENT    180 capsule    Take 3 capsules (750 mg) by mouth 2 times daily    Kidney transplanted       OMEGA 3 PO      Take 1 capsule by mouth daily        omeprazole 20 MG tablet    priLOSEC OTC     30 tablet    Take  by mouth daily.    Chronic hepatitis B (H), HTN (hypertension), Depression, Unspecified essential hypertension       predniSONE 5 MG tablet    DELTASONE    90 tablet    Take 1 tablet (5 mg) by mouth daily    Kidney transplanted       TYLENOL 325 MG tablet   Generic drug:  acetaminophen      Take 1-2 tablets by mouth every 6 hours as needed.        valACYclovir 1000 mg tablet    VALTREX    21 tablet    Take 1 tablet (1,000 mg) by mouth 3 times daily    Herpes zoster without complication       * Notice:  This list has 2 medication(s) that are the same as other medications prescribed for you. Read the directions carefully, and ask your doctor or other care provider to review them with you.

## 2018-09-06 ENCOUNTER — HOSPITAL ENCOUNTER (OUTPATIENT)
Dept: OCCUPATIONAL THERAPY | Facility: CLINIC | Age: 58
Discharge: HOME | End: 2018-09-06
Attending: OPTOMETRIST | Admitting: OPTOMETRIST
Payer: MEDICARE

## 2018-09-06 PROCEDURE — G8988 SELF CARE GOAL STATUS: HCPCS | Mod: GO,CI | Performed by: OCCUPATIONAL THERAPIST

## 2018-09-06 PROCEDURE — 40000249 ZZH STATISTIC VISIT LOW VISION CLINIC: Performed by: OCCUPATIONAL THERAPIST

## 2018-09-06 PROCEDURE — G8989 SELF CARE D/C STATUS: HCPCS | Mod: GO,CI | Performed by: OCCUPATIONAL THERAPIST

## 2018-09-06 PROCEDURE — 97535 SELF CARE MNGMENT TRAINING: CPT | Mod: GO | Performed by: OCCUPATIONAL THERAPIST

## 2018-09-06 NOTE — PROGRESS NOTES
09/06/18 1200   Signing Clinician's Name / Credentials   Signing clinician's name / credentials Angeles Sanchez OTR/L   Session Number   Session Number 2   Reporting period 08/09/2018-10/11/2018   Authorization status Discharge this date: goals met   Recertification   Recertification Due 10/11/18   Progress Notes   Progress Note Due 10/11/18   OT Medicare Only G-code   G-code Self Care   Self Care   Self Care Goal,  (eval/re-eval, every progress note & discharge) CI: 1-19% impairment   Self Care Discharge Status,  (discharge) CI: 1-19% impairment   Discharge Self Care Modifier Rationale MNRead, self report, clinical observations   GOALS   Goals Near Vision;Visual Field;Environmental Modification;Resource Education   Goals Addressed this Session Near vision   Goal 1 - Near Vision   Goal Description: Near Vision Patient will verbalize awareness of visual field Loss and demonstrate improved use of visual skills/adaptive equipment for increased independence in reading-based activities of daily living, written communication and detail ADL tasks.   Near Vision Goal Comment goal met. Identified accessibility modifications for ind. use of electronics: computer and smart phone, magnification for reading: 3X stand illuminated magnifier.  Read to 0.4M with 3X stand illuminated magnifier, and demonstrated 0.8M critical print size. Identifiied paperwhite Abby utilizing accessibility features provided good alternative to reading paper books.    Target Date 10/11/18   Date Met 09/06/18   Goal 2 - Visual field   Goal Description: Visual field Patient will verbalize awareness of visual field loss and demonstrate improved use of visual skills for increased safety/ independence in locating objects/obstacles and in navigation   Visual Field Goal Comment Goal met. Pt demonstrated visual search of extrapersonal space withing normal limits.    Target Date 10/11/18   Date Met 09/06/18   Goal 3 - Environmental modification  "  Goal Description: Environment modification Patient will demonstrate understanding of the impact of lighting, contrast and glare on ADL activities, in conjunction with environmentally-based ADL modifications   Environmental Modification Goal Comment Goal met. Identified light gray tint as optimal for glare managment, and full spectrum lighting for reading task light. Stated light made \"a big difference!\"   Target Date 10/11/18   Date Met 09/06/18   Goal 4 - Resource education   Goal Description: Resource education Patient will verbalize awareness of community resources for the following:;Audio access to print materials;Access to large print materials;Access to low vision devices;Support in vocational goals   Resource Education Goal Comment goal met as written   Target Date 10/11/18   Date Met 09/06/18       Present No   Therapy Diagnosis   Therapy  Diagnosis: Impaired ADL/IADL with deficits in Reading based ADL;Home management  (glare and light management)   Subjective Report   Subjective Report \"I feel like a have a whole new phone!\" following instructions in accessibility features   Visual Rehab Treatment [place]   Daily Treatment [place] Self Care/Home Management   Self Care/Home Management   Minutes 70   Skilled Intervention Training in use of magnifiers for reading;Training in focal distance, tracking across a page of print, and mechanics of magnification use;Training in use of electronic aids for reading;Instruction in task lighting placement/directionality, assessment of lighting for optimal  clarity/minimal glare;Trial of tinted eye glasses to manage symptoms of glare   Patient Response demonstrated independence in reading based ADL and met goals this date   Treatment Detail Reading: Pt reports his current 4X dome magnifier slows his reading secondary to mechanics of use, small field of view. Introduced 3X stand illuminated magnifier, and pt demonstrated last print size read of 0.4M " and critical print size of 0.8M. Pt very pleased with clarity and verbalized plan to obtain.  Resources were issued. Cell phone: provided instruction in accessbility features of cell phone. Elected to turn on cell phone magnifieir, to increase font  and display size, to utilize green filter to reduce glare, and to invert contrast for long form reading. Pt demonstrated ind. turning features on and off. Computer: provided training in Working [employer] screen magnifier. Pt identified 1.75X magnification would provide independence in computer based reading, and pt demonstated excellent understanding of use. Filters: trialed filters for outdoor glare/UV management. Identified light gray as optimal. Pt reported he met the legal requirement for visual field for driving. requested resources for Behind the Wheel evaluation, and [employer] brochure issued.  Resources for all devices issued.    Progress goals met   [place] Read   Smallest print size read 04M   Critical print size 0.8M   Words per minute at critical print size 150   Equipment/Resources   Written resources provided (Behind the Wheel evaluations, optical/non optical devices)   Assessments Completed   Assessments Completed MNRead   Education   Learner Patient   Readiness Eager;Acceptance   Method Explanation;Demonstration   Response Verbalizes understanding;Needs reinforcement   Communication with other professionals   Communication with other professionals per EHR   Plan   Plan for next session Discharge: goals met   Total Session Time   Timed Code Treatment Minutes 70   Total Treatment Time (sum of timed and untimed services) 70

## 2018-10-23 ENCOUNTER — OFFICE VISIT (OUTPATIENT)
Dept: INTERNAL MEDICINE | Facility: CLINIC | Age: 58
End: 2018-10-23
Payer: MEDICARE

## 2018-10-23 VITALS
RESPIRATION RATE: 20 BRPM | WEIGHT: 174.2 LBS | DIASTOLIC BLOOD PRESSURE: 80 MMHG | BODY MASS INDEX: 26.49 KG/M2 | SYSTOLIC BLOOD PRESSURE: 116 MMHG | HEART RATE: 66 BPM

## 2018-10-23 DIAGNOSIS — Z94.0 KIDNEY REPLACED BY TRANSPLANT: ICD-10-CM

## 2018-10-23 DIAGNOSIS — Z79.899 ENCOUNTER FOR LONG-TERM CURRENT USE OF MEDICATION: ICD-10-CM

## 2018-10-23 DIAGNOSIS — E78.5 DYSLIPIDEMIA: Primary | ICD-10-CM

## 2018-10-23 DIAGNOSIS — B18.1 CHRONIC HEPATITIS B (H): ICD-10-CM

## 2018-10-23 DIAGNOSIS — Z48.298 AFTERCARE FOLLOWING ORGAN TRANSPLANT: ICD-10-CM

## 2018-10-23 DIAGNOSIS — B18.1 CHRONIC VIRAL HEPATITIS B WITHOUT DELTA AGENT AND WITHOUT COMA (H): ICD-10-CM

## 2018-10-23 DIAGNOSIS — E78.5 DYSLIPIDEMIA: ICD-10-CM

## 2018-10-23 LAB
ALBUMIN SERPL-MCNC: 3.5 G/DL (ref 3.4–5)
ALP SERPL-CCNC: 87 U/L (ref 40–150)
ALT SERPL W P-5'-P-CCNC: 22 U/L (ref 0–70)
ANION GAP SERPL CALCULATED.3IONS-SCNC: 7 MMOL/L (ref 3–14)
AST SERPL W P-5'-P-CCNC: 20 U/L (ref 0–45)
BILIRUB DIRECT SERPL-MCNC: 0.2 MG/DL (ref 0–0.2)
BILIRUB SERPL-MCNC: 0.8 MG/DL (ref 0.2–1.3)
BUN SERPL-MCNC: 9 MG/DL (ref 7–30)
CALCIUM SERPL-MCNC: 9.2 MG/DL (ref 8.5–10.1)
CHLORIDE SERPL-SCNC: 103 MMOL/L (ref 94–109)
CHOLEST SERPL-MCNC: 145 MG/DL
CK SERPL-CCNC: 86 U/L (ref 30–300)
CO2 SERPL-SCNC: 28 MMOL/L (ref 20–32)
CREAT SERPL-MCNC: 0.83 MG/DL (ref 0.66–1.25)
CREAT UR-MCNC: 77 MG/DL
ERYTHROCYTE [DISTWIDTH] IN BLOOD BY AUTOMATED COUNT: 15.1 % (ref 10–15)
GFR SERPL CREATININE-BSD FRML MDRD: >90 ML/MIN/1.7M2
GLUCOSE SERPL-MCNC: 82 MG/DL (ref 70–99)
HCT VFR BLD AUTO: 45 % (ref 40–53)
HDLC SERPL-MCNC: 67 MG/DL
HGB BLD-MCNC: 14 G/DL (ref 13.3–17.7)
LDLC SERPL CALC-MCNC: 59 MG/DL
MCH RBC QN AUTO: 28.1 PG (ref 26.5–33)
MCHC RBC AUTO-ENTMCNC: 31.1 G/DL (ref 31.5–36.5)
MCV RBC AUTO: 90 FL (ref 78–100)
NONHDLC SERPL-MCNC: 78 MG/DL
PLATELET # BLD AUTO: 352 10E9/L (ref 150–450)
POTASSIUM SERPL-SCNC: 3.7 MMOL/L (ref 3.4–5.3)
PROT SERPL-MCNC: 7.4 G/DL (ref 6.8–8.8)
PROT UR-MCNC: 0.16 G/L
PROT/CREAT 24H UR: 0.21 G/G CR (ref 0–0.2)
RBC # BLD AUTO: 4.99 10E12/L (ref 4.4–5.9)
SODIUM SERPL-SCNC: 138 MMOL/L (ref 133–144)
TRIGL SERPL-MCNC: 97 MG/DL
WBC # BLD AUTO: 7.8 10E9/L (ref 4–11)

## 2018-10-23 PROCEDURE — 87517 HEPATITIS B DNA QUANT: CPT | Performed by: INTERNAL MEDICINE

## 2018-10-23 ASSESSMENT — PAIN SCALES - GENERAL: PAINLEVEL: MODERATE PAIN (5)

## 2018-10-23 NOTE — NURSING NOTE
Jerad Ross      1.  Has the patient received the information for the influenza vaccine? YES    2.  Does the patient have any of the following contraindications?     Allergy to eggs?  No     Allergic reaction to previous influenza vaccines?  No     Any other problems to previous influenza vaccines?  No     Paralyzed by Guillain-[place] syndrome?  No     Currently pregnant? NO     Current moderate or severe illness?  No     Allergy to contact lens solution?  No    3.  The vaccine has been administered in the usual fashion and the patient was instructed to wait 20 minutes before leaving the building in the event of an allergic reaction: YES    Vaccination given by Renata Isbell LPN 11:14 AM on 10/23/2018.  Recorded by Renata Isbell   Flu and Pneumonia 23 shots given with out problems, patient tolerated procedure well..Renata Isbell LPN 11:15 AM on 10/23/2018

## 2018-10-23 NOTE — NURSING NOTE
Chief Complaint   Patient presents with     Hypertension     follow up hypertension     Kidney Problem     follow up renal problems     Pain     shoulders/hips   Renata Isbell LPN 10:11 AM on 10/23/2018  Has been screened for HIV.Renata Isbell LPN 10:11 AM on 10/23/2018  Will get bring updated Health Directive to clinic when is done filling out.Renata Isbell LPN 10:13 AM on 10/23/2018

## 2018-10-23 NOTE — PATIENT INSTRUCTIONS
[facility] Medication Refill Request Information:  * Please contact your pharmacy regarding ANY request for medication refills.  ** [facility] Prescription Fax = [phone]  * Please allow 3 business days for routine medication refills.  * Please allow 5 business days for controlled substance medication refills.     [facility] Test Result notification information:  *You will be notified with in 7-10 days of your appointment day regarding the results of your test.  If you are on MyChart you will be notified as soon as the provider has reviewed the results and signed off on them.    [facility]: [phone]

## 2018-10-23 NOTE — MR AVS SNAPSHOT
After Visit Summary   10/23/2018    Jerad Ross    MRN: [id number]           Patient Information     Date Of Birth          1960        Visit Information        Provider Department      10/23/2018 10:20 AM Aston Perry MD [facility]        Today's Diagnoses     Dyslipidemia    -  1    Kidney replaced by transplant          Care Instructions    Primary Care Center Medication Refill Request Information:  * Please contact your pharmacy regarding ANY request for medication refills.  ** [facility] Prescription Fax = [phone]  * Please allow 3 business days for routine medication refills.  * Please allow 5 business days for controlled substance medication refills.     Primary Care Center Test Result notification information:  *You will be notified with in 7-10 days of your appointment day regarding the results of your test.  If you are on MyChart you will be notified as soon as the provider has reviewed the results and signed off on them.    [facility]: [phone]           Follow-ups after your visit        Follow-up notes from your care team     Return in about 6 months (around 4/23/2019).      Your next 10 appointments already scheduled     Nov 02, 2018  9:30 AM CDT   RETURN GLAUCOMA with Viki Rizzo MD   Eye Clinic ([facility])    [address]  [facility] [address] [phone]            Dec 11, 2018  2:05 PM CST   (Arrive by 1:35 PM)   Return Kidney Transplant with  Kidney/Pancreas Recipient [address] [place] [address] Nephrology ([facility])    [address]  [address]  [facility] [id number] [phone]            Dec 13, 2018 10:30 AM CST   (Arrive by 10:15 AM)   Return Visit with Sisi Lockwood MD   [facility] ([facility])    [address]  [address]  [facility] [address]    [phone]            Dec 13, 2018 12:30 PM CST   Adult Med Follow UP with RONALDO Dempsey CNP   Psychiatry Clinic ([facility])    [place] [id number]  [address] [address] [phone]              Future tests that were ordered for you today     Open Future Orders        Priority Expected Expires Ordered    CK total Routine  10/23/2019 10/23/2018    Lipid Profile Routine  10/23/2019 10/23/2018            Who to contact     Please call your clinic at [phone] to:    Ask questions about your health    Make or cancel appointments    Discuss your medicines    Learn about your test results    Speak to your doctor            Additional Information About Your Visit        [email or web address] Information     [email or web address] gives you secure access to your electronic health record. If you see a primary care provider, you can also send messages to your care team and make appointments. If you have questions, please call your primary care clinic.  If you do not have a primary care provider, please call [phone] and they will assist you.      [email or web address] is an electronic gateway that provides easy, online access to your medical records. With [email or web address], you can request a clinic appointment, read your test results, renew a prescription or communicate with your care team.     To access your existing account, please contact your [facility] or call [phone] for assistance.        Care EveryWhere ID     This is your Care EveryWhere ID. This could be used by other organizations to access your [facility] medical records  [id number]        Your Vitals Were     Pulse Respirations BMI (Body Mass Index)             66 20 26.49 kg/m2          Blood Pressure from Last 3 Encounters:   10/23/18 116/80   08/15/18 137/82   06/12/18 113/77    Weight from Last 3 Encounters:   10/23/18 79 kg (174 lb 3.2 oz)   08/15/18 76.7 kg (169 lb)   06/12/18 76.5 kg (168 lb 9.6  oz)              We Performed the Following     ADMIN: Vaccine, Initial (96753)     HC FLU VAC PRESRV FREE QUAD SPLIT VIR 3+YRS IM     Pneumococcal vaccine 23 valent PPSV23  (Pneumovax) [25778]        Primary Care Provider Office Phone # Fax #    Aston Perry [phone]       [address] [place] [address]  [facility] [address]        Equal Access to Services     CARLOS MENDOZA : Hadii aad ku hadasho Soomaali, waaxda luqadaha, qaybta kaalmada adeegyada, waxay idiin hayaan adeeg kharash la'aan ah. So [facility] [phone].    ATENCIÓN: Si habla espsweetie, tiene a sanchez disposición servicios gratuitos de asistencia lingüística. Llame al [phone].    We comply with applicable federal civil rights laws and [place] laws. We do not discriminate on the basis of race, color, national origin, age, disability, sex, sexual orientation, or gender identity.            Thank you!     Thank you for choosing [facility]  for your care. Our goal is always to provide you with excellent care. Hearing back from our patients is one way we can continue to improve our services. Please take a few minutes to complete the written survey that you may receive in the mail after your visit with us. Thank you!             Your Updated Medication List - Protect others around you: Learn how to safely use, store and throw away your medicines at www.disposemymeds.org.          This list is accurate as of 10/23/18 10:43 AM.  Always use your most recent med list.                   Brand Name Dispense Instructions for use Diagnosis    albuterol 108 (90 Base) MCG/ACT inhaler    PROAIR HFA    1 Inhaler    Inhale 2 puffs into the lungs every 6 hours as needed for shortness of breath / dyspnea or wheezing    Cough, Wheezing       amLODIPine 5 MG tablet    NORVASC    90 tablet    Take 1 tablet (5 mg) by mouth daily    Acute chest pain       aspirin 81 MG tablet      Take by mouth daily        atorvastatin 20 MG tablet     LIPITOR    90 tablet    Take 1 tablet (20 mg) by mouth daily    NSTEMI (non-ST elevated myocardial infarction) (H)       BETA BLOCKER NOT PRESCRIBED (INTENTIONAL)      Beta Blocker not prescribed intentionally due to Bradycardia < 50 bpm without beta blocker therapy    NSTEMI (non-ST elevated myocardial infarction) (H)       BUDESONIDE (INHALATION) 90 MCG/ACT Aepb     1 each    Inhale 2 puffs into the lungs 2 times daily    Asthma       calcium citrate-vitamin D 315-200 MG-UNIT Tabs per tablet    CITRACAL     Take 1 tablet by mouth daily.        clopidogrel 75 MG tablet    PLAVIX    90 tablet    Take 1 tablet (75 mg) by mouth daily    NSTEMI (non-ST elevated myocardial infarction) (H), Coronary artery disease involving native coronary artery of native heart without angina pectoris       entecavir 0.5 MG tablet    BARACLUDE    90 tablet    Take 1 tablet (0.5 mg) by mouth daily    Chronic viral hepatitis B without delta agent and without coma (H), Chronic hepatitis B (H)       FLUoxetine 40 MG capsule    PROzac    90 capsule    Take 1 capsule (40 mg) by mouth every morning    Major depressive disorder, recurrent episode, moderate (H)       fluticasone 50 MCG/ACT spray    FLONASE    3 Bottle    Spray 1-2 sprays into both nostrils daily    Bronchitis with bronchospasm       fluticasone-salmeterol 250-50 MCG/DOSE diskus inhaler    ADVAIR    60 Inhaler    Inhale 1 puff into the lungs every 12 hours    Cough       ibuprofen 200 MG tablet    ADVIL/MOTRIN     Take 200 mg by mouth every 4 hours as needed for mild pain    Bronchitis, Essential hypertension, Coronary artery disease involving native heart without angina pectoris, unspecified vessel or lesion type       isosorbide mononitrate 30 MG 24 hr tablet    IMDUR    45 tablet    Take 0.5 tablets (15 mg) by mouth daily    Acute chest pain       latanoprost 0.005 % ophthalmic solution    XALATAN    3 Bottle    Place 1 drop into both eyes At Bedtime    Borderline glaucoma  with ocular hypertension, bilateral       losartan 50 MG tablet    COZAAR    90 tablet    Take 0.5 tablets (25 mg) by mouth daily    Hypertension, unspecified type       * montelukast 10 MG tablet    SINGULAIR    30 tablet    Take 1 tablet (10 mg) by mouth At Bedtime    Cough       * montelukast 10 MG tablet    SINGULAIR    30 tablet    Take 1 tablet (10 mg) by mouth At Bedtime    Bronchitis with bronchospasm       MULTIVITAMIN/IRON PO      Take 1 tablet by mouth daily        mycophenolate 250 MG capsule    GENERIC EQUIVALENT    180 capsule    Take 3 capsules (750 mg) by mouth 2 times daily    Kidney transplanted       OMEGA 3 PO      Take 1 capsule by mouth daily        omeprazole 20 MG tablet    priLOSEC OTC    30 tablet    Take  by mouth daily.    Chronic hepatitis B (H), HTN (hypertension), Depression, Unspecified essential hypertension       predniSONE 5 MG tablet    DELTASONE    90 tablet    Take 1 tablet (5 mg) by mouth daily    Kidney transplanted       TYLENOL 325 MG tablet   Generic drug:  acetaminophen      Take 1-2 tablets by mouth every 6 hours as needed.        valACYclovir 1000 mg tablet    VALTREX    21 tablet    Take 1 tablet (1,000 mg) by mouth 3 times daily    Herpes zoster without complication       * Notice:  This list has 2 medication(s) that are the same as other medications prescribed for you. Read the directions carefully, and ask your doctor or other care provider to review them with you.

## 2018-10-23 NOTE — PROGRESS NOTES
HPI  58-year-old kidney transplant patient presents today for reevaluation of his blood pressure history of low testosterone and hyperlipidemia.  He has been doing well on his present medications his cough is largely resolved and he is not having any persistent cough or dyspnea.  Is here to start the inhaled steroids his recent CT scan showed clearing of the previous changes.  His blood pressures been under excellent control on his present medications.  Said no associated chest pain dyspnea or other complaints.  He does have a history of low testosterone and low free testosterone several years ago his total testosterone was normal he is inquiring about this but he is presently not sexually active and does not anticipate this changing.  Really could not identify any symptoms or problems related to the testosterone that he would benefit from supplementation and therefore decided not to check it.  He has had some aching and discomfort in the shoulders and knees he is not had any muscle aching or discomfort.  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  We elected to check his CK for this.  Otherwise he has been doing well and has no other complaints or problems.    Past and Family hx reviewed and updated    Past Medical History:   Diagnosis Date     AION (acute ischemic optic neuropathy)      Anemia in chronic renal disease      Avascular necrosis of bones of both hips (H)     s/p bilateral hip replacements     Basal cell carcinoma      Chronic hepatitis B (H)      Clostridium difficile colitis      Coronary artery disease involving native coronary artery of native heart without angina pectoris 6/17/2014    Coronary angiogram 6/17/14: Severe distal 3-vessel disease involving small vessels, not amenable to PCI or CABG.     CRP elevated      Depression      Diverticulosis      Dyslipidemia      FSGS (focal segmental glomerulosclerosis)      Gastric ulcer with hemorrhage 2/12/12     GERD (gastroesophageal  reflux disease)      Glaucoma     OHTN     Hemorrhoids      Hypertension secondary to other renal disorders      Hypogonadism in male      Immunosuppressed status (H)      Kidney replaced by transplant     focal glomerulosclerosis      NSTEMI (non-ST elevated myocardial infarction) (H) 6/17/2014     JULIANE (obstructive sleep apnea)     Doesn't use CPAP     Paracentral scotoma     LE     Secondary renal hyperparathyroidism (H)      Squamous cell carcinoma      Past Surgical History:   Procedure Laterality Date     APPENDECTOMY       CATARACT IOL, RT/LT  4/19/2000    RE     CATARACT IOL, RT/LT  6/1/2000    LE     COLECTOMY SUBTOTAL  1983    10 cm, diverticulitis     COLONOSCOPY  2/13/2012    Procedure:COLONOSCOPY; Surgeon:SLOAN GALLARDO; [facility]     ESOPHAGOSCOPY, GASTROSCOPY, DUODENOSCOPY (EGD), COMBINED  2/13/2012    Procedure:COMBINED ESOPHAGOSCOPY, GASTROSCOPY, DUODENOSCOPY (EGD); Surgeon:SLOAN GALLARDO; [facility]     EXTRACAPSULAR CATARACT EXTRATION WITH INTRAOCULAR LENS IMPLANT  4-20-10, 6-1-10    Rt, Lt     HERNIA REPAIR  1995    Lt inguinal     HIP SURGERY      1981, bilat MITZI, revised 2001, 2005     KIDNEY SURGERY  1978 and 1993    transplant     KNEE SURGERY  1983, 1987    bilat TKA     MOHS MICROGRAPHIC PROCEDURE       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, auxiliary spleen     TONSILLECTOMY       Family History   Problem Relation Age of Onset     Cardiovascular Father      AI with valve repair     Hypertension Father      KIDNEY DISEASE Father      Cancer Paternal Grandmother      ovarian ca     Cerebrovascular Disease Paternal Grandfather      Cerebrovascular Disease Maternal Grandfather      KIDNEY DISEASE Paternal Aunt      Skin Cancer No family hx of      Glaucoma No family hx of      Macular Degeneration No family hx of      Amblyopia No family hx of      Social History     Social History     Marital status:      Spouse name: N/A     Number of children: 0      Years of education: N/A     Occupational History      Disabled      [employer]     Social History Main Topics     Smoking status: Former Smoker     Packs/day: 1.00     Years: 9.00     Quit date: 1/1/1988     Smokeless tobacco: Former User     Alcohol use 0.0 oz/week     0 Standard drinks or equivalent per week      Comment: rarely 1 drink per month     Drug use: No     Sexual activity: Not Asked     Other Topics Concern     Special Diet No     Exercise Yes     walks     Social History Narrative    . Wife is also a kidney transplant recipient.     Works part-time     Complete review of symptoms negative except as noted above.    Exam:  /80 (BP Location: Right arm, Patient Position: Sitting, Cuff Size: Adult Regular)  Pulse 66  Resp 20  Wt 79 kg (174 lb 3.2 oz)  BMI 26.49 kg/m2  174 lbs 3.2 oz  The patient is alert, oriented with a clear sensorium.   Skin shows numerous keratotic lesions no rashes and good turgor.   Head is normocephalic and atraumatic.    Neck shows no nodes, thyromegaly.     Lungs are clear.   Heart shows normal S1 and S2 without murmur or gallop.    Extremities show no edema.    ASSESSMENT  1 hyperlipidemia needs reassessment  2 status post kidney transplant  3 hypertension well-controlled  4 low testosterone asymptomatic  5 history of coronary artery disease stable  6 cough improved related to previous asthma and infection  7 osteoarthritis    Plan  We will reassess his labs today we will check his CK in light of the aching although I think this is largely arthritic we will update his immunizations with Pneumovax and a flu shot will have him reassessed in 6 months    This note was completed using Dragon voice recognition software.  Although reviewed after completion, some word and grammatical errors may occur.    Aston Perry MD  General Internal Medicine  Primary Care Center  [phone]

## 2018-10-24 ENCOUNTER — DOCUMENTATION ONLY (OUTPATIENT)
Dept: TRANSPLANT | Facility: CLINIC | Age: 58
End: 2018-10-24

## 2018-10-25 NOTE — PROGRESS NOTES
Chart Prep    Clinic Visit on: 12/11/18    Last lab completed: 10/23/18    Lab letter updated: 6/25/18    Lab orders up to date in Epic.

## 2018-10-26 LAB
HBV DNA SERPL NAA+PROBE-ACNC: NORMAL [IU]/ML
HBV DNA SERPL NAA+PROBE-LOG IU: NORMAL {LOG_IU}/ML

## 2018-11-04 ENCOUNTER — HOSPITAL ENCOUNTER (INPATIENT)
Facility: CLINIC | Age: 58
LOS: 6 days | Discharge: NURSING, REHAB OR LONG-TERM CARE | DRG: 558 | End: 2018-11-10
Attending: EMERGENCY MEDICINE | Admitting: FAMILY MEDICINE
Payer: MEDICARE

## 2018-11-04 ENCOUNTER — APPOINTMENT (OUTPATIENT)
Dept: MRI IMAGING | Facility: CLINIC | Age: 58
DRG: 558 | End: 2018-11-04
Attending: EMERGENCY MEDICINE
Payer: MEDICARE

## 2018-11-04 DIAGNOSIS — M71.10 SEPTIC BURSITIS: ICD-10-CM

## 2018-11-04 DIAGNOSIS — D84.9 IMMUNOSUPPRESSED STATUS (H): ICD-10-CM

## 2018-11-04 DIAGNOSIS — Z74.09 IMPAIRED MOBILITY: ICD-10-CM

## 2018-11-04 DIAGNOSIS — M54.50 ACUTE MIDLINE LOW BACK PAIN WITHOUT SCIATICA: Primary | ICD-10-CM

## 2018-11-04 DIAGNOSIS — M46.26 INFECTION OF LUMBAR SPINE (H): ICD-10-CM

## 2018-11-04 LAB
ALBUMIN UR-MCNC: 10 MG/DL
ANION GAP SERPL CALCULATED.3IONS-SCNC: 6 MMOL/L (ref 3–14)
APPEARANCE UR: CLEAR
BASOPHILS # BLD AUTO: 0 10E9/L (ref 0–0.2)
BASOPHILS NFR BLD AUTO: 0.2 %
BILIRUB UR QL STRIP: NEGATIVE
BUN SERPL-MCNC: 10 MG/DL (ref 7–30)
CALCIUM SERPL-MCNC: 8.3 MG/DL (ref 8.5–10.1)
CHLORIDE SERPL-SCNC: 107 MMOL/L (ref 94–109)
CO2 SERPL-SCNC: 27 MMOL/L (ref 20–32)
COLOR UR AUTO: YELLOW
CREAT SERPL-MCNC: 0.69 MG/DL (ref 0.66–1.25)
CREAT SERPL-MCNC: 0.75 MG/DL (ref 0.66–1.25)
CRP SERPL-MCNC: 110 MG/L (ref 0–8)
DIFFERENTIAL METHOD BLD: ABNORMAL
EOSINOPHIL # BLD AUTO: 0.1 10E9/L (ref 0–0.7)
EOSINOPHIL NFR BLD AUTO: 1.1 %
ERYTHROCYTE [DISTWIDTH] IN BLOOD BY AUTOMATED COUNT: 15.3 % (ref 10–15)
ERYTHROCYTE [SEDIMENTATION RATE] IN BLOOD BY WESTERGREN METHOD: 18 MM/H (ref 0–20)
GFR SERPL CREATININE-BSD FRML MDRD: >90 ML/MIN/1.7M2
GFR SERPL CREATININE-BSD FRML MDRD: >90 ML/MIN/1.7M2
GLUCOSE SERPL-MCNC: 77 MG/DL (ref 70–99)
GLUCOSE UR STRIP-MCNC: NEGATIVE MG/DL
HCT VFR BLD AUTO: 40.4 % (ref 40–53)
HGB BLD-MCNC: 12.5 G/DL (ref 13.3–17.7)
HGB UR QL STRIP: NEGATIVE
IMM GRANULOCYTES # BLD: 0 10E9/L (ref 0–0.4)
IMM GRANULOCYTES NFR BLD: 0.2 %
KETONES UR STRIP-MCNC: 10 MG/DL
LACTATE BLD-SCNC: 1.1 MMOL/L (ref 0.7–2)
LEUKOCYTE ESTERASE UR QL STRIP: NEGATIVE
LYMPHOCYTES # BLD AUTO: 1.7 10E9/L (ref 0.8–5.3)
LYMPHOCYTES NFR BLD AUTO: 17.2 %
MCH RBC QN AUTO: 28.5 PG (ref 26.5–33)
MCHC RBC AUTO-ENTMCNC: 30.9 G/DL (ref 31.5–36.5)
MCV RBC AUTO: 92 FL (ref 78–100)
MONOCYTES # BLD AUTO: 1.7 10E9/L (ref 0–1.3)
MONOCYTES NFR BLD AUTO: 17.3 %
MUCOUS THREADS #/AREA URNS LPF: PRESENT /LPF
NEUTROPHILS # BLD AUTO: 6.2 10E9/L (ref 1.6–8.3)
NEUTROPHILS NFR BLD AUTO: 64 %
NITRATE UR QL: NEGATIVE
NRBC # BLD AUTO: 0 10*3/UL
NRBC BLD AUTO-RTO: 0 /100
PH UR STRIP: 6.5 PH (ref 5–7)
PLATELET # BLD AUTO: 280 10E9/L (ref 150–450)
PLATELET # BLD AUTO: 307 10E9/L (ref 150–450)
POTASSIUM SERPL-SCNC: 3.3 MMOL/L (ref 3.4–5.3)
RBC # BLD AUTO: 4.39 10E12/L (ref 4.4–5.9)
RBC #/AREA URNS AUTO: 1 /HPF (ref 0–2)
SODIUM SERPL-SCNC: 140 MMOL/L (ref 133–144)
SOURCE: ABNORMAL
SP GR UR STRIP: 1.02 (ref 1–1.03)
SQUAMOUS #/AREA URNS AUTO: <1 /HPF (ref 0–1)
TRANS CELLS #/AREA URNS HPF: <1 /HPF (ref 0–1)
UROBILINOGEN UR STRIP-MCNC: NORMAL MG/DL (ref 0–2)
WBC # BLD AUTO: 9.6 10E9/L (ref 4–11)
WBC #/AREA URNS AUTO: 1 /HPF (ref 0–5)

## 2018-11-04 PROCEDURE — 86140 C-REACTIVE PROTEIN: CPT | Performed by: EMERGENCY MEDICINE

## 2018-11-04 PROCEDURE — 25500064 ZZH RX 255 OP 636: Performed by: RADIOLOGY

## 2018-11-04 PROCEDURE — 99285 EMERGENCY DEPT VISIT HI MDM: CPT | Mod: Z6 | Performed by: EMERGENCY MEDICINE

## 2018-11-04 PROCEDURE — 96366 THER/PROPH/DIAG IV INF ADDON: CPT | Performed by: EMERGENCY MEDICINE

## 2018-11-04 PROCEDURE — 25000132 ZZH RX MED GY IP 250 OP 250 PS 637: Mod: GY | Performed by: FAMILY MEDICINE

## 2018-11-04 PROCEDURE — 85025 COMPLETE CBC W/AUTO DIFF WBC: CPT | Performed by: EMERGENCY MEDICINE

## 2018-11-04 PROCEDURE — A9585 GADOBUTROL INJECTION: HCPCS | Performed by: RADIOLOGY

## 2018-11-04 PROCEDURE — 25000128 H RX IP 250 OP 636: Performed by: STUDENT IN AN ORGANIZED HEALTH CARE EDUCATION/TRAINING PROGRAM

## 2018-11-04 PROCEDURE — 25000128 H RX IP 250 OP 636: Performed by: FAMILY MEDICINE

## 2018-11-04 PROCEDURE — 25000128 H RX IP 250 OP 636: Performed by: EMERGENCY MEDICINE

## 2018-11-04 PROCEDURE — 80048 BASIC METABOLIC PNL TOTAL CA: CPT | Performed by: EMERGENCY MEDICINE

## 2018-11-04 PROCEDURE — 81001 URINALYSIS AUTO W/SCOPE: CPT | Performed by: FAMILY MEDICINE

## 2018-11-04 PROCEDURE — 99285 EMERGENCY DEPT VISIT HI MDM: CPT | Mod: 25 | Performed by: EMERGENCY MEDICINE

## 2018-11-04 PROCEDURE — 25000131 ZZH RX MED GY IP 250 OP 636 PS 637: Mod: GY | Performed by: FAMILY MEDICINE

## 2018-11-04 PROCEDURE — 96375 TX/PRO/DX INJ NEW DRUG ADDON: CPT | Performed by: EMERGENCY MEDICINE

## 2018-11-04 PROCEDURE — 96361 HYDRATE IV INFUSION ADD-ON: CPT | Performed by: EMERGENCY MEDICINE

## 2018-11-04 PROCEDURE — 96365 THER/PROPH/DIAG IV INF INIT: CPT | Mod: 59 | Performed by: EMERGENCY MEDICINE

## 2018-11-04 PROCEDURE — 83605 ASSAY OF LACTIC ACID: CPT | Performed by: EMERGENCY MEDICINE

## 2018-11-04 PROCEDURE — 12000001 ZZH R&B MED SURG/OB UMMC

## 2018-11-04 PROCEDURE — 72158 MRI LUMBAR SPINE W/O & W/DYE: CPT

## 2018-11-04 PROCEDURE — 87040 BLOOD CULTURE FOR BACTERIA: CPT | Performed by: EMERGENCY MEDICINE

## 2018-11-04 PROCEDURE — 85652 RBC SED RATE AUTOMATED: CPT | Performed by: EMERGENCY MEDICINE

## 2018-11-04 PROCEDURE — A9270 NON-COVERED ITEM OR SERVICE: HCPCS | Mod: GY | Performed by: FAMILY MEDICINE

## 2018-11-04 PROCEDURE — 25000125 ZZHC RX 250: Performed by: FAMILY MEDICINE

## 2018-11-04 PROCEDURE — 85049 AUTOMATED PLATELET COUNT: CPT | Performed by: STUDENT IN AN ORGANIZED HEALTH CARE EDUCATION/TRAINING PROGRAM

## 2018-11-04 PROCEDURE — 82565 ASSAY OF CREATININE: CPT | Performed by: STUDENT IN AN ORGANIZED HEALTH CARE EDUCATION/TRAINING PROGRAM

## 2018-11-04 PROCEDURE — 36415 COLL VENOUS BLD VENIPUNCTURE: CPT | Performed by: STUDENT IN AN ORGANIZED HEALTH CARE EDUCATION/TRAINING PROGRAM

## 2018-11-04 RX ORDER — ERTAPENEM 1 G/1
1 INJECTION, POWDER, LYOPHILIZED, FOR SOLUTION INTRAMUSCULAR; INTRAVENOUS EVERY 24 HOURS
Status: DISCONTINUED | OUTPATIENT
Start: 2018-11-05 | End: 2018-11-06

## 2018-11-04 RX ORDER — ASPIRIN 325 MG
1 TABLET ORAL DAILY
COMMUNITY

## 2018-11-04 RX ORDER — DOCUSATE SODIUM 100 MG/1
100 CAPSULE, LIQUID FILLED ORAL 2 TIMES DAILY
Status: DISCONTINUED | OUTPATIENT
Start: 2018-11-04 | End: 2018-11-10 | Stop reason: HOSPADM

## 2018-11-04 RX ORDER — SODIUM CHLORIDE 9 MG/ML
INJECTION, SOLUTION INTRAVENOUS CONTINUOUS
Status: DISCONTINUED | OUTPATIENT
Start: 2018-11-04 | End: 2018-11-04

## 2018-11-04 RX ORDER — MYCOPHENOLATE MOFETIL 250 MG/1
750 CAPSULE ORAL 2 TIMES DAILY
Status: DISCONTINUED | OUTPATIENT
Start: 2018-11-04 | End: 2018-11-10 | Stop reason: HOSPADM

## 2018-11-04 RX ORDER — HYDROMORPHONE HCL/0.9% NACL/PF 0.2MG/0.2
0.2 SYRINGE (ML) INTRAVENOUS
Status: DISCONTINUED | OUTPATIENT
Start: 2018-11-04 | End: 2018-11-05

## 2018-11-04 RX ORDER — LATANOPROST 50 UG/ML
1 SOLUTION/ DROPS OPHTHALMIC AT BEDTIME
Status: DISCONTINUED | OUTPATIENT
Start: 2018-11-04 | End: 2018-11-10 | Stop reason: HOSPADM

## 2018-11-04 RX ORDER — PROCHLORPERAZINE 25 MG
25 SUPPOSITORY, RECTAL RECTAL EVERY 12 HOURS PRN
Status: DISCONTINUED | OUTPATIENT
Start: 2018-11-04 | End: 2018-11-10 | Stop reason: HOSPADM

## 2018-11-04 RX ORDER — POLYETHYLENE GLYCOL 3350 17 G/17G
17 POWDER, FOR SOLUTION ORAL DAILY PRN
Status: DISCONTINUED | OUTPATIENT
Start: 2018-11-04 | End: 2018-11-05

## 2018-11-04 RX ORDER — NALOXONE HYDROCHLORIDE 0.4 MG/ML
.1-.4 INJECTION, SOLUTION INTRAMUSCULAR; INTRAVENOUS; SUBCUTANEOUS
Status: DISCONTINUED | OUTPATIENT
Start: 2018-11-04 | End: 2018-11-10 | Stop reason: HOSPADM

## 2018-11-04 RX ORDER — ACETAMINOPHEN 325 MG/1
650 TABLET ORAL EVERY 4 HOURS PRN
Status: DISCONTINUED | OUTPATIENT
Start: 2018-11-04 | End: 2018-11-10 | Stop reason: HOSPADM

## 2018-11-04 RX ORDER — ENTECAVIR 0.5 MG/1
0.5 TABLET, FILM COATED ORAL DAILY
Status: DISCONTINUED | OUTPATIENT
Start: 2018-11-04 | End: 2018-11-10 | Stop reason: HOSPADM

## 2018-11-04 RX ORDER — OXYCODONE HYDROCHLORIDE 5 MG/1
5-10 TABLET ORAL
Status: DISCONTINUED | OUTPATIENT
Start: 2018-11-04 | End: 2018-11-09

## 2018-11-04 RX ORDER — LIDOCAINE 40 MG/G
CREAM TOPICAL
Status: DISCONTINUED | OUTPATIENT
Start: 2018-11-04 | End: 2018-11-10

## 2018-11-04 RX ORDER — AMOXICILLIN 250 MG
2 CAPSULE ORAL 2 TIMES DAILY PRN
Status: DISCONTINUED | OUTPATIENT
Start: 2018-11-04 | End: 2018-11-10 | Stop reason: HOSPADM

## 2018-11-04 RX ORDER — MORPHINE SULFATE 4 MG/ML
4 INJECTION, SOLUTION INTRAMUSCULAR; INTRAVENOUS
Status: COMPLETED | OUTPATIENT
Start: 2018-11-04 | End: 2018-11-04

## 2018-11-04 RX ORDER — ONDANSETRON 2 MG/ML
4 INJECTION INTRAMUSCULAR; INTRAVENOUS ONCE
Status: COMPLETED | OUTPATIENT
Start: 2018-11-04 | End: 2018-11-04

## 2018-11-04 RX ORDER — BISACODYL 10 MG
10 SUPPOSITORY, RECTAL RECTAL DAILY PRN
Status: DISCONTINUED | OUTPATIENT
Start: 2018-11-04 | End: 2018-11-10 | Stop reason: HOSPADM

## 2018-11-04 RX ORDER — GADOBUTROL 604.72 MG/ML
0.1 INJECTION INTRAVENOUS ONCE
Status: COMPLETED | OUTPATIENT
Start: 2018-11-04 | End: 2018-11-04

## 2018-11-04 RX ORDER — ATORVASTATIN CALCIUM 20 MG/1
20 TABLET, FILM COATED ORAL DAILY
Status: DISCONTINUED | OUTPATIENT
Start: 2018-11-04 | End: 2018-11-10 | Stop reason: HOSPADM

## 2018-11-04 RX ORDER — ERTAPENEM 1 G/1
1 INJECTION, POWDER, LYOPHILIZED, FOR SOLUTION INTRAMUSCULAR; INTRAVENOUS ONCE
Status: COMPLETED | OUTPATIENT
Start: 2018-11-04 | End: 2018-11-04

## 2018-11-04 RX ORDER — ONDANSETRON 4 MG/1
4 TABLET, ORALLY DISINTEGRATING ORAL EVERY 6 HOURS PRN
Status: DISCONTINUED | OUTPATIENT
Start: 2018-11-04 | End: 2018-11-10 | Stop reason: HOSPADM

## 2018-11-04 RX ORDER — ERTAPENEM 1 G/1
1 INJECTION, POWDER, LYOPHILIZED, FOR SOLUTION INTRAMUSCULAR; INTRAVENOUS ONCE
Status: DISCONTINUED | OUTPATIENT
Start: 2018-11-04 | End: 2018-11-04

## 2018-11-04 RX ORDER — AMLODIPINE BESYLATE 5 MG/1
5 TABLET ORAL DAILY
Status: DISCONTINUED | OUTPATIENT
Start: 2018-11-04 | End: 2018-11-10 | Stop reason: HOSPADM

## 2018-11-04 RX ORDER — ONDANSETRON 2 MG/ML
4 INJECTION INTRAMUSCULAR; INTRAVENOUS EVERY 6 HOURS PRN
Status: DISCONTINUED | OUTPATIENT
Start: 2018-11-04 | End: 2018-11-10 | Stop reason: HOSPADM

## 2018-11-04 RX ORDER — AMOXICILLIN 250 MG
1 CAPSULE ORAL 2 TIMES DAILY PRN
Status: DISCONTINUED | OUTPATIENT
Start: 2018-11-04 | End: 2018-11-10 | Stop reason: HOSPADM

## 2018-11-04 RX ORDER — PROCHLORPERAZINE MALEATE 5 MG
10 TABLET ORAL EVERY 6 HOURS PRN
Status: DISCONTINUED | OUTPATIENT
Start: 2018-11-04 | End: 2018-11-10 | Stop reason: HOSPADM

## 2018-11-04 RX ORDER — ASPIRIN 81 MG/1
81 TABLET, CHEWABLE ORAL DAILY
Status: DISCONTINUED | OUTPATIENT
Start: 2018-11-04 | End: 2018-11-10 | Stop reason: HOSPADM

## 2018-11-04 RX ORDER — PREDNISONE 5 MG/1
5 TABLET ORAL DAILY
Status: DISCONTINUED | OUTPATIENT
Start: 2018-11-04 | End: 2018-11-10 | Stop reason: HOSPADM

## 2018-11-04 RX ADMIN — SODIUM CHLORIDE: 9 INJECTION, SOLUTION INTRAVENOUS at 18:41

## 2018-11-04 RX ADMIN — ENTECAVIR 0.5 MG: 0.5 TABLET ORAL at 19:16

## 2018-11-04 RX ADMIN — DOCUSATE SODIUM 100 MG: 100 CAPSULE, LIQUID FILLED ORAL at 19:16

## 2018-11-04 RX ADMIN — Medication 15 MG: at 19:16

## 2018-11-04 RX ADMIN — Medication 0.2 MG: at 17:45

## 2018-11-04 RX ADMIN — ENOXAPARIN SODIUM 40 MG: 40 INJECTION SUBCUTANEOUS at 20:32

## 2018-11-04 RX ADMIN — LATANOPROST 1 DROP: 50 SOLUTION OPHTHALMIC at 21:24

## 2018-11-04 RX ADMIN — MORPHINE SULFATE 4 MG: 4 INJECTION INTRAVENOUS at 12:15

## 2018-11-04 RX ADMIN — AMLODIPINE BESYLATE 5 MG: 5 TABLET ORAL at 17:49

## 2018-11-04 RX ADMIN — VANCOMYCIN HYDROCHLORIDE 2000 MG: 10 INJECTION, POWDER, LYOPHILIZED, FOR SOLUTION INTRAVENOUS at 16:13

## 2018-11-04 RX ADMIN — SODIUM CHLORIDE 500 ML: 9 INJECTION, SOLUTION INTRAVENOUS at 12:23

## 2018-11-04 RX ADMIN — OMEPRAZOLE 20 MG: 20 CAPSULE, DELAYED RELEASE ORAL at 19:16

## 2018-11-04 RX ADMIN — ATORVASTATIN CALCIUM 20 MG: 20 TABLET, FILM COATED ORAL at 17:49

## 2018-11-04 RX ADMIN — MYCOPHENOLATE MOFETIL 750 MG: 250 CAPSULE ORAL at 19:15

## 2018-11-04 RX ADMIN — GADOBUTROL 7.9 ML: 604.72 INJECTION INTRAVENOUS at 13:04

## 2018-11-04 RX ADMIN — PREDNISONE 5 MG: 5 TABLET ORAL at 17:49

## 2018-11-04 RX ADMIN — ASPIRIN 81 MG CHEWABLE TABLET 81 MG: 81 TABLET CHEWABLE at 17:48

## 2018-11-04 RX ADMIN — ONDANSETRON HYDROCHLORIDE 4 MG: 2 INJECTION, SOLUTION INTRAMUSCULAR; INTRAVENOUS at 12:16

## 2018-11-04 RX ADMIN — ACETAMINOPHEN 650 MG: 325 TABLET, FILM COATED ORAL at 17:48

## 2018-11-04 RX ADMIN — ERTAPENEM SODIUM 1 G: 1 INJECTION, POWDER, LYOPHILIZED, FOR SOLUTION INTRAMUSCULAR; INTRAVENOUS at 14:47

## 2018-11-04 ASSESSMENT — ACTIVITIES OF DAILY LIVING (ADL)
RETIRED_EATING: 0-->INDEPENDENT
TOILETING: 0-->INDEPENDENT
RETIRED_COMMUNICATION: 0-->UNDERSTANDS/COMMUNICATES WITHOUT DIFFICULTY
AMBULATION: 0-->INDEPENDENT
BATHING: 0-->INDEPENDENT
COGNITION: 0 - NO COGNITION ISSUES REPORTED
FALL_HISTORY_WITHIN_LAST_SIX_MONTHS: NO
SWALLOWING: 0-->SWALLOWS FOODS/LIQUIDS WITHOUT DIFFICULTY
ADLS_ACUITY_SCORE: 12
DRESS: 0-->INDEPENDENT
TRANSFERRING: 0-->INDEPENDENT

## 2018-11-04 ASSESSMENT — ENCOUNTER SYMPTOMS
RHINORRHEA: 0
SHORTNESS OF BREATH: 0
FREQUENCY: 0
HEMATURIA: 0
COUGH: 0
DYSPHORIC MOOD: 0
WEAKNESS: 0
VOMITING: 0
BLOOD IN STOOL: 0
ARTHRALGIAS: 1
NAUSEA: 0
ABDOMINAL PAIN: 0
ADENOPATHY: 0
DIARRHEA: 0
SORE THROAT: 0
MYALGIAS: 0
LIGHT-HEADEDNESS: 0
FEVER: 0
POLYDIPSIA: 0
FEVER: 1
NUMBNESS: 0
BACK PAIN: 1
CHILLS: 0
DYSURIA: 0
CONSTIPATION: 0
HEADACHES: 0

## 2018-11-04 ASSESSMENT — PAIN DESCRIPTION - DESCRIPTORS: DESCRIPTORS: CONSTANT;SHARP

## 2018-11-04 NOTE — CONSULTS
[facility]       NEUROSURGERY CONSULTATION NOTE    This consultation was requested by Dr. Matthews from the ED service.    Reason for Consultation: L4-5 infected spinous process bursal cyst    HPI:  Mr. Ross is a 59 yo M with PMH kidney transplant 1993 on chronic immunosuppression with mycophenolate and prednisone, asthma, NSTEMI, chronic hepatitis B, arthritis presenting with low back pain. Patient states he has been having increased low back pain for the past 1 day without any known triggering events. Has some L > R hip pain, but it is mild, and he has BL hip replacements with occasional flares. States fever of 100.6 measured at home last night. History of shingles in 8/2018. Denies any radiating pain down the legs, or weakness, numbness, bowel/bladder symptoms.      PAST MEDICAL HISTORY:   Past Medical History:   Diagnosis Date     AION (acute ischemic optic neuropathy)      Anemia in chronic renal disease      Avascular necrosis of bones of both hips (H)     s/p bilateral hip replacements     Basal cell carcinoma      Chronic hepatitis B (H)      Clostridium difficile colitis      Coronary artery disease involving native coronary artery of native heart without angina pectoris 6/17/2014    Coronary angiogram 6/17/14: Severe distal 3-vessel disease involving small vessels, not amenable to PCI or CABG.     CRP elevated      Depression      Diverticulosis      Dyslipidemia      FSGS (focal segmental glomerulosclerosis)      Gastric ulcer with hemorrhage 2/12/12     GERD (gastroesophageal reflux disease)      Glaucoma     OHTN     Hemorrhoids      Hypertension secondary to other renal disorders      Hypogonadism in male      Immunosuppressed status (H)      Kidney replaced by transplant     focal glomerulosclerosis      NSTEMI (non-ST elevated myocardial infarction) (H) 6/17/2014     JULIANE (obstructive sleep apnea)     Doesn't use CPAP     Paracentral scotoma     LE      Secondary renal hyperparathyroidism (H)      Squamous cell carcinoma        PAST SURGICAL HISTORY:   Past Surgical History:   Procedure Laterality Date     APPENDECTOMY       CATARACT IOL, RT/LT  4/19/2000    RE     CATARACT IOL, RT/LT  6/1/2000    LE     COLECTOMY SUBTOTAL  1983    10 cm, diverticulitis     COLONOSCOPY  2/13/2012    Procedure:COLONOSCOPY; Surgeon:SLOAN GALLARDO; Location: GI     ESOPHAGOSCOPY, GASTROSCOPY, DUODENOSCOPY (EGD), COMBINED  2/13/2012    Procedure:COMBINED ESOPHAGOSCOPY, GASTROSCOPY, DUODENOSCOPY (EGD); Surgeon:SLOAN GALLARDO; Location: GI     EXTRACAPSULAR CATARACT EXTRATION WITH INTRAOCULAR LENS IMPLANT  4-20-10, 6-1-10    Rt, Lt     HERNIA REPAIR  1995    Lt inguinal     HIP SURGERY      1981, bilat MITZI, revised 2001, 2005     KIDNEY SURGERY  1978 and 1993    transplant     KNEE SURGERY  1983, 1987    bilat TKA     MOHS MICROGRAPHIC PROCEDURE       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, auxiliary spleen     TONSILLECTOMY         FAMILY HISTORY:   Family History   Problem Relation Age of Onset     Cardiovascular Father      AI with valve repair     Hypertension Father      KIDNEY DISEASE Father      Cancer Paternal Grandmother      ovarian ca     Cerebrovascular Disease Paternal Grandfather      Cerebrovascular Disease Maternal Grandfather      KIDNEY DISEASE Paternal Aunt      Skin Cancer No family hx of      Glaucoma No family hx of      Macular Degeneration No family hx of      Amblyopia No family hx of        SOCIAL HISTORY:   Social History   Substance Use Topics     Smoking status: Former Smoker     Packs/day: 1.00     Years: 9.00     Quit date: 1/1/1988     Smokeless tobacco: Former User     Alcohol use 0.0 oz/week     0 Standard drinks or equivalent per week      Comment: rarely 1 drink per month       MEDICATIONS:  No current outpatient prescriptions on file.       Allergies:  Allergies   Allergen Reactions     Penicillins Shortness Of Breath and  Hives     Keflex [Cephalexin Hcl] Other (See Comments)     Pt could not recall reaction     Tetracycline Other (See Comments)     Patient could not recall reaction     Sulfa Drugs Rash       ROS: 10 point ROS of systems including Constitutional, Eyes, Respiratory, Cardiovascular, Gastroenterology, Genitourinary, Integumentary, Muscularskeletal, Psychiatric were all negative except for pertinent positives noted in my HPI.    Physical exam:   Blood pressure 124/68, pulse 71, temperature 99.4  F (37.4  C), temperature source Oral, resp. rate 16, weight 82.5 kg (181 lb 14.1 oz), SpO2 95 %.  CV: heart rate as above  PULM: breathing comfortably on room air  NEUROLOGIC:  -- Awake; Alert; oriented x 3  -- Follows commands briskly  -- Speech fluent, spontaneous. No aphasia or dysarthria.  -- no gaze preference. No apparent hemineglect.  Cranial Nerves:  -- visual fields full to confrontation, PERRL, extraocular movements intact  -- face symmetrical, tongue midline  -- sensory V1-V3 intact bilaterally  -- palate elevates symmetrically, uvula midline  -- hearing grossly intact bilat  -- Trapezii 5/5 strength bilat symmetric  -- Cerebellar: Finger nose finger without dysmetria    Motor:  Normal bulk / tone; no tremor, rigidity, or bradykinesia.  No muscle wasting or fasciculations  No Pronator Drift     Delt Bi Tri Hand Flexion/  Extension Iliopsoas Quadriceps Hamstrings Tibialis Anterior Gastroc    C5 C6 C7 C8/T1 L2 L3 L4-S1 L4 S1   R 5 5 5 5 5 5 5 5 5   L 5 5 5 5 5 5 5 5 5   Sensory:  intact to LT x 4 extremities     Reflexes:     Bi Tri BR Ching Pat Ach Bab    C5-6 C7-8 C6 UMN L2-4 S1 UMN   R 2+ 2+ 2+ Norm 2+ 2+ Norm   L 2+ 2+ 2+ Norm 2+ 2+ Norm     Gait: Not assessed.      IMAGING:  MRI lumbar spine 11/4/18:  1. Findings suspicious for infected L4-5 interspinous bursal cyst with adjacent reactive epidural thickening and paraspinous cellulitis. No drainable fluid collection.  2. Multilevel lumbar spondylosis. Moderate  bilateral neural foraminal stenosis at L4-5. Mild spinal canal stenosis at L1-2.  3. Multiple well-circumscribed hemorrhagic foci in the right perivertebral soft tissues at L5 in the location of a severely atrophied right psoas muscle, unchanged dating back to at least 7/13/2015, suspected chronic organized hematoma.    LABS:   -   - ESR, lactate, WBC WNL    ASSESSMENT:    # L4-5 interspinous bursal cyst, possibly infected    RECOMMENDATIONS:  - No neurosurgical intervention indicated at this time   - Please continue with antibiotics and current cares per primary team.    The patient was discussed with Dr. Ly, neurosurgery chief resident, and he agrees with the above.    Luigi Frost MD  Neurosurgery Resident PGY1

## 2018-11-04 NOTE — IP AVS SNAPSHOT
Jerad Ross #[id number] (CSN:[id number])  (58 year old M)  (Adm: 18)     [id number]               [address] [place] [address] BANK: [phone]            Patient Demographics     Patient Name Sex          Age SSN Address Phone    Jerad Ross Male 1960 (58 year old) [id number]   [facility] [address] [phone] (Home)  [phone] (Mobile) *Preferred*      Emergency Contact(s)     Name Relation Home Work Mobile    Jennifer Ross Spouse [phone]      Admission Information     Attending Provider Admitting Provider Admission Type Admission Date/Time    Torsten Villanueva MD Eide, Kristin, DO Elective 18  1058    Discharge Date Hospital Service Auth/Cert Status Service Area     Internal Medicine [facility] SERVICES    Unit Room/Bed Admission Status       [facility] 7417/[id number] Admission (Confirmed)       Admission     Complaint    Infection of lumbar spine (H)      Hospital Account     Name Acct ID Class Status Primary Coverage    Jerad Ross [id number] Inpatient Open MEDICARE - MEDICARE            Guarantor Account (for Hospital Account #[id number])     Name Relation to Pt Service Area Active? Acct Type    Jerad Ross  FCS Yes Personal/Family    Address Phone          [address] [place] [address] [phone](H)              Coverage Information (for Hospital Account #[id number])     1. MEDICARE/MEDICARE     F/O Payor/Plan Precert #    MEDICARE/MEDICARE     Subscriber Subscriber #    Jerad Ross [id number]    Address Phone    ATTN CLAIMS  [address]  [facility] [place] [address] [phone]          2. [place]     F/O Payor/Plan Precert #    [place]     Subscriber Subscriber #    Jerad Ross [id number]    Address Phone    [address] [address]  [place] [address] [phone]                                                      INTERAGENCY  TRANSFER FORM - PHYSICIAN ORDERS   11/4/2018                       UNIT [address] [facility] BANK: [phone]            Attending Provider: Torsten Villanueva MD     Allergies:  Penicillins, Keflex [Cephalexin Hcl], Tetracycline, Sulfa Drugs    Infection:  None   Service:  INTERNAL MED    Ht:  --   Wt:  82.5 kg (181 lb 12.8 oz)   Admission Wt:  79.4 kg (175 lb)    BMI:  --   BSA:  --            ED Clinical Impression     Diagnosis Description Comment Added By Time Added    Infection of lumbar spine (H) [M86.9] Infection of lumbar spine (H) [M86.9]  Gneia Matthews MD 11/4/2018  2:11 PM      Hospital Problems as of 11/10/2018              Priority Class Noted POA    Acute midline low back pain without sciatica Medium  11/4/2018 Yes    Septic bursitis Medium  11/4/2018 Yes    Impaired mobility Medium  11/4/2018 Yes    Infection of lumbar spine (H) Medium  11/4/2018 Yes      Non-Hospital Problems as of 11/10/2018              Priority Class Noted    BCC (basal cell carcinoma), trunk Medium  5/2/2012    JULIANE (obstructive sleep apnea) Medium  Unknown    Hypertension secondary to other renal disorders Medium  Unknown    Coronary artery disease involving native coronary artery of native heart without angina pectoris Medium  6/17/2014    NSTEMI (non-ST elevated myocardial infarction) (H) Medium  6/17/2014    Depression Medium  Unknown    Diverticulosis Medium  Unknown    Hemorrhoids Medium  Unknown    Kidney replaced by transplant Medium  Unknown    Basal cell carcinoma Medium  Unknown    Squamous cell carcinoma Medium  Unknown    Dyslipidemia Medium  Unknown    CRP elevated Medium  Unknown    Glaucoma Medium  Unknown    AION (acute ischemic optic neuropathy) Medium  Unknown    Paracentral scotoma Medium  Unknown    Hip pain Medium  6/19/2015    Inflamed seborrheic keratosis Medium  1/23/2016    Intertrigo Medium  1/23/2016    Chronic hepatitis B (H) Medium  7/19/2016    Immunosuppressed status (H) Medium  Unknown     Hypogonadism in male Medium  Unknown    GERD (gastroesophageal reflux disease) Medium  Unknown    Aftercare following organ transplant Medium  6/19/2017      Code Status History     Date Active Date Inactive Code Status Order ID Comments User Context    11/10/2018 10:41 AM  Full Code [id number]  Adi Shin MD Outpatient    11/4/2018  5:05 PM 11/10/2018 10:41 AM Full Code [id number]  Adi Shin MD Inpatient    6/20/2015 12:40 PM 11/4/2018  5:05 PM Full Code [id number]  Mei Rojas, APRN CNP Outpatient    6/19/2015 10:45 PM 6/20/2015 12:40 PM Full Code [id number]  Yee Davis, APRDALIA CNP Inpatient    5/22/2015  3:28 PM 5/24/2015  4:04 PM Full Code [id number]  [place] Lynn, APRN CNP Inpatient    10/8/2014 12:02 PM 5/22/2015  3:28 PM Full Code [id number]  Jennifer Quispe MD Outpatient    10/3/2014  7:09 PM 10/8/2014 12:02 PM Full Code [id number]  Trinidad Wills MD Inpatient    6/18/2014 10:25 AM 10/3/2014  7:09 PM Full Code [id number]  Austin Brown MD Outpatient    6/17/2014  4:36 AM 6/18/2014 10:25 AM Full Code [id number]  Yee Davis NP Inpatient    2/12/2012 11:44 PM 2/14/2012  3:54 PM Full Code [id number]  Germain Carrera MD Inpatient      Current Code Status     Date Active Code Status Order ID Comments User Context       Prior      Summary of Visit     Reason for your hospital stay       You were hospitalized for infected bursa in your back.  You were treated with IV antibiotics and improved clinically.  You were improving and stable at the time of discharge                Medication Review      START taking        Dose / Directions Comments    cefTRIAXone 2 GM vial   Commonly known as:  ROCEPHIN   Indication:  intraspinous septic bursitis        Dose:  2 g   Inject 2 g into the vein every 24 hours   Quantity:  10 each   Refills:  0        docusate sodium 100 MG capsule   Commonly known as:  COLACE        Dose:  100 mg   Take 1  capsule (100 mg) by mouth 2 times daily   Quantity:  60 capsule   Refills:  0        heparin lock flush 10 UNIT/ML Soln injection        Dose:  2-5 mL   2-5 mLs by Intracatheter route once as needed for line flush (for locking each dormant lumen with line placement)   Refills:  0        oxyCODONE IR 5 MG tablet   Commonly known as:  ROXICODONE        Dose:  5-10 mg   Take 1-2 tablets (5-10 mg) by mouth every 6 hours as needed for moderate to severe pain   Quantity:  19 tablet   Refills:  0        polyethylene glycol Packet   Commonly known as:  MIRALAX/GLYCOLAX        Dose:  17 g   Start taking on:  11/11/2018   Take 17 g by mouth daily   Quantity:  7 packet   Refills:  0          CONTINUE these medications which have NOT CHANGED        Dose / Directions Comments    albuterol 108 (90 Base) MCG/ACT inhaler   Commonly known as:  PROAIR HFA   Used for:  Cough, Wheezing        Dose:  2 puff   Inhale 2 puffs into the lungs every 6 hours as needed for shortness of breath / dyspnea or wheezing   Quantity:  1 Inhaler   Refills:  2    Dispense 18g       amLODIPine 5 MG tablet   Commonly known as:  NORVASC   Used for:  Acute chest pain        Dose:  5 mg   Take 1 tablet (5 mg) by mouth daily   Quantity:  90 tablet   Refills:  1        aspirin 81 MG tablet        Dose:  81 mg   Take 81 mg by mouth daily   Refills:  0        atorvastatin 20 MG tablet   Commonly known as:  LIPITOR   Used for:  NSTEMI (non-ST elevated myocardial infarction) (H)        Dose:  20 mg   Take 1 tablet (20 mg) by mouth daily   Quantity:  90 tablet   Refills:  1        BETA BLOCKER NOT PRESCRIBED (INTENTIONAL)   Used for:  NSTEMI (non-ST elevated myocardial infarction) (H)        Beta Blocker not prescribed intentionally due to Bradycardia < 50 bpm without beta blocker therapy   Refills:  0        BUDESONIDE (INHALATION) 90 MCG/ACT Aepb   Used for:  Asthma        Dose:  2 puff   Inhale 2 puffs into the lungs 2 times daily   Quantity:  1 each   Refills:   3        calcium citrate-vitamin D 315-200 MG-UNIT Tabs per tablet   Commonly known as:  CITRACAL        Dose:  1 tablet   Take 1 tablet by mouth daily.   Refills:  0        CHOLECALCIFEROL PO        Dose:  1 tablet   Take 1 tablet by mouth daily Dose unknown   Refills:  0        clopidogrel 75 MG tablet   Commonly known as:  PLAVIX   Used for:  NSTEMI (non-ST elevated myocardial infarction) (H), Coronary artery disease involving native coronary artery of native heart without angina pectoris        Dose:  75 mg   Take 1 tablet (75 mg) by mouth daily   Quantity:  90 tablet   Refills:  1        entecavir 0.5 MG tablet   Commonly known as:  BARACLUDE   Used for:  Chronic viral hepatitis B without delta agent and without coma (H), Chronic hepatitis B (H)        Dose:  0.5 mg   Take 1 tablet (0.5 mg) by mouth daily   Quantity:  90 tablet   Refills:  3        FLUoxetine 40 MG capsule   Commonly known as:  PROzac   Used for:  Major depressive disorder, recurrent episode, moderate (H)        Dose:  40 mg   Take 1 capsule (40 mg) by mouth every morning   Quantity:  90 capsule   Refills:  1        fluticasone 50 MCG/ACT spray   Commonly known as:  FLONASE   Used for:  Bronchitis with bronchospasm        Dose:  1-2 spray   Spray 1-2 sprays into both nostrils daily   Quantity:  3 Bottle   Refills:  11        fluticasone-salmeterol 250-50 MCG/DOSE diskus inhaler   Commonly known as:  ADVAIR   Used for:  Cough        Dose:  1 puff   Inhale 1 puff into the lungs every 12 hours   Quantity:  60 Inhaler   Refills:  1        isosorbide mononitrate 30 MG 24 hr tablet   Commonly known as:  IMDUR   Used for:  Acute chest pain        Dose:  15 mg   Take 0.5 tablets (15 mg) by mouth daily   Quantity:  45 tablet   Refills:  1        latanoprost 0.005 % ophthalmic solution   Commonly known as:  XALATAN   Used for:  Borderline glaucoma with ocular hypertension, bilateral        Dose:  1 drop   Place 1 drop into both eyes At Bedtime    Quantity:  3 Bottle   Refills:  3        mycophenolate 250 MG capsule   Commonly known as:  GENERIC EQUIVALENT   Used for:  Kidney transplanted        Dose:  750 mg   Take 3 capsules (750 mg) by mouth 2 times daily   Quantity:  180 capsule   Refills:  11        OMEGA 3 PO        Dose:  1 capsule   Take 1 capsule by mouth daily Dose unknown   Refills:  0        omeprazole 20 MG tablet   Commonly known as:  priLOSEC OTC   Used for:  Chronic hepatitis B (H), HTN (hypertension), Depression, Unspecified essential hypertension        Take  by mouth daily.   Quantity:  30 tablet   Refills:  0        ONE DAILY MULTIVITAMIN/IRON Tabs        Dose:  1 tablet   Take 1 tablet by mouth daily   Refills:  0        predniSONE 5 MG tablet   Commonly known as:  DELTASONE   Used for:  Kidney transplanted        Dose:  5 mg   Take 1 tablet (5 mg) by mouth daily   Quantity:  90 tablet   Refills:  3        TYLENOL 325 MG tablet   Generic drug:  acetaminophen        Dose:  1-2 tablet   Take 1-2 tablets by mouth every 6 hours as needed.   Refills:  0                After Care     Activity - Up ad muriel           Advance Diet as Tolerated       Follow this diet upon discharge: Orders Placed This Encounter      Regular Diet Adult       General info for SNF       Length of Stay Estimate: Short Term Care: Estimated # of Days <30    Condition at Discharge: Improving  Level of care:skilled   Rehabilitation Potential: Good  Admission H&P remains valid and up-to-date: Yes  Recent Chemotherapy: N/A  Use Nursing Home Standing Orders: N/A       Mantoux instructions       Give two-step Mantoux (PPD) Per Facility Policy Yes             Follow-Up Appointment Instructions     Follow Up and recommended labs and tests       Follow up with [facility] physician.  The following labs/tests are recommended: Bi-Weekly CRP and CBC, Follow up in 2 weeks with primary care provider             Your next 10 appointments already scheduled     Dec 11, 2018  2:05 PM  CST   (Arrive by 1:35 PM)   Return Kidney Transplant with  Kidney/Pancreas Recipient 1   [facility] ([facility])    [address]  [address]  [facility] [address]   [phone]            Dec 13, 2018 10:30 AM CST   (Arrive by 10:15 AM)   Return Visit with Sisi Lockwood MD   [facility] ([facility])    [address]  [address]  [facility] [address]   [phone]            Dec 13, 2018 12:30 PM CST   Adult Med Follow UP with RONALDO Dempsey CNP   Psychiatry Clinic ([facility])    [address]  [address] [address] [phone]            Feb 04, 2019  9:45 AM CST   RETURN GLAUCOMA with Viki Rizzo MD   Eye Clinic ([facility])    [address]  9[address] Clin [address]a  [facility] [address] [phone]              Statement of Approval     Ordered          11/10/18 8097  I have reviewed and agree with all the recommendations and orders detailed in this document.  EFFECTIVE NOW     Approved and electronically signed by:  Adi Shin MD                                                 INTERAGENCY TRANSFER FORM - NURSING   11/4/2018                       UNIT [address] [facility] BANK: [phone]            Attending Provider: Torsten Villanueva MD     Allergies:  Penicillins, Keflex [Cephalexin Hcl], Tetracycline, Sulfa Drugs    Infection:  None   Service:  INTERNAL MED    Ht:  --   Wt:  82.5 kg (181 lb 12.8 oz)   Admission Wt:  79.4 kg (175 lb)    BMI:  --   BSA:  --            Advance Directives        Scanned docmt in ACP Activity?           No scanned doc        Immunizations     Name Date      HEPA 01/20/16     HEPA 01/26/09     Influenza (IIV3) PF 09/20/17     Influenza (IIV3) PF 11/04/16     Influenza (IIV3) PF 10/15/15     Influenza (IIV3) PF 11/11/13     Influenza (IIV3)  PF 01/04/12     Influenza (IIV3) PF 11/10/10     Influenza (IIV3) PF 01/26/09     Influenza Vaccine IM 3yrs+ 4 Valent IIV4 10/23/18     Influenza Vaccine IM 3yrs+ 4 Valent IIV4 10/08/14     Pneumo Conj 13-V (2010&after) 10/08/14     Pneumococcal 23 valent 10/23/18     Pneumococcal 23 valent 11/01/05     Tdap (Adacel,Boostrix) 01/26/09       ASSESSMENT     Discharge Profile Flowsheet     EXPECTED DISCHARGE     Inspection of bony prominences  Full 11/10/18 0831    Expected Discharge Date  11/10/18 (TCU) 11/09/18 1704   Full except areas not inspected   Coccyx;Sacrum;Buttock, right;Buttock, left;Hip, right;Hip, left 11/09/18 0224    DISCHARGE NEEDS ASSESSMENT     Inspection under devices  Full 11/10/18 0831    Equipment Currently Used at Home  none 11/05/18 1544   Not Inspected under devices  IV/art line hub 11/10/18 0831    Transportation Available  family or friend will provide;car 11/05/18 1513   Skin WDL  WDL 11/10/18 0831    Equipment Used at Home  none 06/20/15 1115   Skin Color/Characteristics  without discoloration 11/10/18 0831    GASTROINTESTINAL (ADULT,PEDIATRIC,OB)     Skin Temperature  warm 11/10/18 0831    GI WDL  WDL 11/10/18 0831   Skin Moisture  dry 11/10/18 0831    Last Bowel Movement  11/09/18 11/10/18 0831   Skin Integrity  drain/device(s) 11/10/18 0831    Passing flatus  yes 11/10/18 0831   Skin Elasticity  quick return to original state 11/10/18 0831    COMMUNICATION ASSESSMENT     SAFETY      Patient's communication style  spoken language (English or Bilingual) 11/04/18 1046   Safety WDL  WDL 11/10/18 0831    Patient's primary language  English 11/08/18 0640   Safety Factors  wheels locked;call light in reach 11/10/18 0831    SKIN     All Alarms  none present 11/10/18 0831                 Assessment WDL (Within Defined Limits) Definitions           Safety WDL     Effective: 09/28/15    Row Information: <b>WDL Definition:</b> Bed in low position, wheels locked; call light in reach; upper side  "rails up x 2; ID band on<br> <font color=\"gray\"><i>Item=AS safety wdl>>List=AS safety wdl>>Version=F14</i></font>      Skin WDL     Effective: 09/28/15    Row Information: <b>WDL Definition:</b> Warm; dry; intact; elastic; without discoloration; pressure points without redness<br> <font color=\"gray\"><i>Item=AS skin wdl>>List=AS skin wdl>>Version=F14</i></font>      Vitals     Vital Signs Flowsheet     VITAL SIGNS     Functioning  can do most things, but pain gets in the way of some 11/10/18 0844    Temp  97.1  F (36.2  C) 11/10/18 0606   Sleep  awake with occasional pain 11/10/18 0844    Temp src  Oral 11/10/18 0606   ANALGESIA SIDE EFFECTS MONITORING      Resp  20 11/10/18 0606   Side Effects Monitoring: Respiratory Quality  R 11/10/18 1208    Pulse  58 11/10/18 0606   Side Effects Monitoring: Respiratory Depth  N 11/10/18 1208    Heart Rate  62 11/09/18 0742   Side Effects Monitoring: Sedation Level  1 11/10/18 1208    Pulse/Heart Rate Source  Monitor 11/09/18 2203   ELOY COMA SCALE      BP  124/84 11/10/18 0606   Best Eye Response  4-->(E4) spontaneous 11/06/18 1928    BP Location  Right arm 11/09/18 2203   Best Motor Response  6-->(M6) obeys commands 11/06/18 1928    OXYGEN THERAPY     Best Verbal Response  5-->(V5) oriented 11/06/18 1928    SpO2  99 % 11/10/18 0606   Eloy Coma Scale Score  15 11/06/18 1928    O2 Device  None (Room air) 11/10/18 0606   HEIGHT AND WEIGHT      PAIN/COMFORT     Weight  82.5 kg (181 lb 12.8 oz) 11/09/18 1000    Patient Currently in Pain  denies 11/10/18 1208   Weight Method  Standing scale 11/09/18 1000    Preferred Pain Scale  CAPA (Clinically Aligned Pain Assessment) ([facility] Adults Only) 11/10/18 1208   POSITIONING      Pain Location  Back 11/10/18 0826   Body Position  independently positioning 11/10/18 0831    Pain Orientation  Lower 11/10/18 0826   Head of Bed (HOB)  HOB at 30 degrees 11/10/18 0831    Pain Descriptors  Aching 11/10/18 0826   Chair  " Upright in chair 11/06/18 1622    Pain Management Interventions  analgesia administered 11/10/18 0826   Positioning/Transfer Devices  pillows;in use 11/10/18 0831    Pain Intervention(s)  Medication (See eMAR) 11/10/18 0826   DAILY CARE      Response to Interventions  Decrease in pain 11/10/18 0844   Activity Management  activity adjusted per tolerance;ambulated in room 11/10/18 0831    CLINICALLY ALIGNED PAIN ASSESSMENT (CAPA) ([facility] ADULTS ONLY)     Activity Assistance Provided  independent 11/10/18 0831    Comfort  negligible pain 11/10/18 1208   Assistive Device Utilized  walker 11/09/18 0941    Change in Pain  about the same 11/10/18 0844   Additional Documentation  Activity Device Assistance (Row) 11/07/18 1000    Pain Control  partially effective 11/10/18 0844                 Patient Lines/Drains/Airways Status    Active LINES/DRAINS/AIRWAYS     Name: Placement date: Placement time: Site: Days: Last dressing change:    Rash 11/04/18 1800 other (see comments) perirectal 11/04/18   1800    5             Patient Lines/Drains/Airways Status    Active PICC/CVC     Name: Placement date: Placement time: Site: Days: Additional Info Last dressing change:    PICC Double Lumen 11/10/18 Right Basilic 11/10/18   1114   Basilic   less than 1 Size (Fr): 5 Fr            Orientation: Right            Description: Valved;Power PICC            Site Prep: Chlorhexidine            Insertion attempts with ultrasound: 1            Full barrier precautions done: Yes            Consent Signed: Yes            Time Out performed: Yes               Intake/Output Detail Report     Date Intake     Output Net    Shift P.O. I.V. IV Piggyback Total Urine Total       Day 11/09/18 0000 - 11/09/18 0659 300 -- -- 300 -- -- 300    Alejandra 11/09/18 0700 - 11/09/18 1459 490 -- -- 490 500 500 -10    Noc 11/09/18 1500 - 11/09/18 2359 240 100 -- 340 -- -- 340    Day 11/10/18 0000 - 11/10/18 0659 -- -- -- -- -- -- 0    Alejandra 11/10/18 0700  - 11/10/18 1459 400 -- -- 400 -- -- 400      Last Void/BM       Most Recent Value    Urine Occurrence 1 at 11/10/2018 0755    Stool Occurrence 1 at 11/09/2018 1734      Case Management/Discharge Planning     Case Management/Discharge Planning Flowsheet     REFERRAL INFORMATION     FINAL RESOURCES      Arrived From  emergency department 02/12/12 2301   Equipment Currently Used at Home  none 11/05/18 1544    LIVING ENVIRONMENT     MH/BH CAREGIVER      Lives With  spouse 11/05/18 1513   Filed Complexity Screen Score  7 11/05/18 0943    Living Arrangements  apartment 11/05/18 1513   ABUSE RISK SCREEN      COPING/STRESS     QUESTION TO PATIENT:  Has a member of your family or a partner(now or in the past) intimidated, hurt, manipulated, or controlled you in any way?  no 11/04/18 1051    Major Change/Loss/Stressor  none 11/04/18 1708   QUESTION TO PATIENT: Do you feel safe going back to the place where you are living?  yes 11/04/18 1051    EXPECTED DISCHARGE     OBSERVATION: Is there reason to believe there has been maltreatment of a vulnerable adult (ie. Physical/Sexual/Emotional abuse, self neglect, lack of adequate food, shelter, medical care, or financial exploitation)?  no 11/04/18 1051    Expected Discharge Date  11/10/18 (TCU) 11/09/18 1704   OTHER      DISCHARGE PLANNING     Are you depressed or being treated for depression?  No 11/04/18 1756    Transportation Available  family or friend will provide;car 11/05/18 1513   HOMICIDE RISK      Equipment Used at Home  none 06/20/15 1115   Feels Like Hurting Others  no 11/04/18 1051                  UNIT [facility] [facility]: [phone]            Medication Administration Report for Jerad Ross as of 11/10/18 1255   Legend:    Given Hold Not Given Due Canceled Entry Other Actions    Time Time (Time) Time  Time-Action       Inactive    Active    Linked        Medications 11/04/18 11/05/18 11/06/18 11/07/18 11/08/18 11/09/18 11/10/18    acetaminophen (TYLENOL)  tablet 650 mg  Dose: 650 mg  Freq: EVERY 4 HOURS PRN Route: PO  PRN Reason: mild pain  Start: 11/04/18 1705   Admin Instructions: Alternate ibuprofen (if ordered) with acetaminophen.  Maximum acetaminophen dose from all sources = 75 mg/kg/day not to exceed 4 grams/day.    Admin. Amount: 2 tablet (2 × 325 mg tablet)  Last Admin: 11/10/18 0758  Dispense Loc: [facility] ADS 7C     1748 (650 mg)-Given        1209 (650 mg)-Given       1546 (650 mg)-Given         0836 (650 mg)-Given       1156 (650 mg)-Given       2126 (650 mg)-Given        1101 (650 mg)-Given        0834 (650 mg)-Given        0758 (650 mg)-Given           amLODIPine (NORVASC) tablet 5 mg  Dose: 5 mg  Freq: DAILY Route: PO  Start: 11/04/18 1715   Admin. Amount: 1 tablet (1 × 5 mg tablet)  Last Admin: 11/10/18 0758  Dispense Loc: [facility] ADS 7C     1749 (5 mg)-Given        0839 (5 mg)-Given        0738 (5 mg)-Given        0836 (5 mg)-Given        1056 (5 mg)-Given [C]        0834 (5 mg)-Given        0758 (5 mg)-Given           aspirin chewable tablet 81 mg  Dose: 81 mg  Freq: DAILY Route: PO  Start: 11/04/18 1715   Admin. Amount: 1 tablet (1 × 81 mg tablet)  Last Admin: 11/10/18 0758  Dispense Loc: [facility] ADS 7C     1748 (81 mg)-Given        0839 (81 mg)-Given        0738 (81 mg)-Given        0836 (81 mg)-Given        1057 (81 mg)-Given        0834 (81 mg)-Given        0758 (81 mg)-Given           atorvastatin (LIPITOR) tablet 20 mg  Dose: 20 mg  Freq: DAILY Route: PO  Start: 11/04/18 1715   Admin. Amount: 1 tablet (1 × 20 mg tablet)  Last Admin: 11/10/18 0758  Dispense Loc: [facility] ADS 7C     1749 (20 mg)-Given        0839 (20 mg)-Given        0738 (20 mg)-Given        0836 (20 mg)-Given        1056 (20 mg)-Given        0834 (20 mg)-Given        0758 (20 mg)-Given           bisacodyl (DULCOLAX) Suppository 10 mg  Dose: 10 mg  Freq: DAILY PRN Route: RE  PRN Reason: constipation  Start: 11/04/18 5080   Admin Instructions: Hold for loose stools.  This is the  third step of a three step constipation treatment.    Admin. Amount: 1 suppository (1 × 10 mg suppository)  Dispense Loc: [facility] ADS 7C               cefTRIAXone (ROCEPHIN) 2 g vial to attach to  ml bag for ADULTS or NS 50 ml bag for PEDS  Dose: 2 g  Freq: EVERY 24 HOURS Route: IV  Indications Comment: intraspinous septic bursitis  Start: 11/09/18 1300   Admin. Amount: 2 g  Last Admin: 11/10/18 1131  Dispense Loc: [facility]  Infused Over: 30 Minutes  Volume: 20 mL          1243 (2 g)-New Bag        1131 (2 g)-New Bag                  diphenhydrAMINE (BENADRYL) capsule 25 mg  Dose: 25 mg  Freq: EVERY 6 HOURS PRN Route: PO  PRN Reason: itching  Start: 11/05/18 1824   Admin. Amount: 1 capsule (1 × 25 mg capsule)  Last Admin: 11/08/18 2121  Dispense Loc: [facility]C      1849 (25 mg)-Given        1430 (25 mg)-Given        1622 (25 mg)-Given        2121 (25 mg)-Given             docusate sodium (COLACE) capsule 100 mg  Dose: 100 mg  Freq: 2 TIMES DAILY Route: PO  Start: 11/04/18 2000   Admin Instructions: To prevent constipation.  Hold for loose stools.    Admin. Amount: 1 capsule (1 × 100 mg capsule)  Last Admin: 11/10/18 0758  Dispense Loc: [facility]     1916 (100 mg)-Given        0839 (100 mg)-Given       1935 (100 mg)-Given        0738 (100 mg)-Given       1925 (100 mg)-Given        0836 (100 mg)-Given       2125 (100 mg)-Given        1056 (100 mg)-Given       2021 (100 mg)-Given        0834 (100 mg)-Given       1951 (100 mg)-Given        0758 (100 mg)-Given       [ ] 2000           enoxaparin (LOVENOX) injection 40 mg  Dose: 40 mg  Freq: HOLD Route: SC  PRN Comment: for procedure will resume 24h dosing after  Start: 11/06/18 1215   Admin Instructions: HOLD if platelet count falls below 50% of baseline or less than 100,000/ L and notify provider.    Admin. Amount: 40 mg = 0.4 mL Conc: 40 mg/0.4 mL  Dispense Loc: [facility]  Volume: 0.4 mL               entecavir (BARACLUDE)  tablet 0.5 mg  Dose: 0.5 mg  Freq: DAILY Route: PO  Indications of Use: HEPATITIS  Start: 18   Admin Instructions: Recommended to take on an empty stomach.    Admin. Amount: 1 tablet (1 × 0.5 mg tablet)  Last Admin: 11/10/18 0805  Dispense Loc: [facility]     1916 (0.5 mg)-Given        0839 (0.5 mg)-Given        0739 (0.5 mg)-Given        0836 (0.5 mg)-Given        1056 (0.5 mg)-Given        0834 (0.5 mg)-Given        0805 (0.5 mg)-Given           FLUoxetine (PROzac) capsule 40 mg  Dose: 40 mg  Freq: EVERY MORNING Route: PO  Start: 18 0800   Admin. Amount: 2 capsule (2 × 20 mg capsule)  Last Admin: 11/10/18 0758  Dispense Loc: [facility]      0839 (40 mg)-Given        0738 (40 mg)-Given        0836 (40 mg)-Given        1057 (40 mg)-Given        0834 (40 mg)-Given        0758 (40 mg)-Given           heparin lock flush 10 UNIT/ML injection 2-5 mL  Dose: 2-5 mL  Freq: ONCE PRN Route: IK  PRN Reason: line flush  PRN Comment: for locking each dormant lumen with line placement  Start: 11/10/18 0924   Admin Instructions: May repeat x 1    Admin. Amount: 2-5 mL  Dispense Loc: [facility]  Administrations Remainin  Volume: 5 mL               isosorbide mononitrate (IMDUR) 24 hr half-tab 15 mg  Dose: 15 mg  Freq: DAILY Route: PO  Start: 18   Admin Instructions: DO NOT CRUSH. Can split tablet in half along score ana.    Admin. Amount: 1 half-tab (1 × 15 mg half-tab)  Last Admin: 11/10/18 0805  Dispense Loc: [facility]     1916 (15 mg)-Given        0839 (15 mg)-Given        0739 (15 mg)-Given        0836 (15 mg)-Given        1057 (15 mg)-Given [C]        0834 (15 mg)-Given        0805 (15 mg)-Given           latanoprost (XALATAN) 0.005 % ophthalmic solution 1 drop  Dose: 1 drop  Freq: AT BEDTIME Route: Both Eyes  Start: 18   Admin. Amount: 1 drop  Last Admin: 18  Dispense Loc: [facility]  Volume: 2.5 mL      (1 drop)-Given         " (1 drop)-Given [C]        (0118)-Not Given [C]        (1 drop)-Given         (1 drop)-Given         (1 drop)-Given         (1 drop)-Given        [ ]            lidocaine (LMX4) cream  Freq: ONCE PRN Route: Top  PRN Reason: moderate pain  PRN Comment: for local anesthetic during PICC insertion  Start: 11/10/18 0924   Admin Instructions: Apply to PICC Insertion Site. Apply 30 minutes prior to procedure. MAX Dose: 2.5 gm  (1/2 of 5 gm tube)      Dispense Loc: [facility]  Administrations Remainin               lidocaine (LMX4) cream  Freq: EVERY 1 HOUR PRN Route: Top  PRN Reason: pain  PRN Comment: with VAD insertion or accessing implanted port.  Start: 18   Admin Instructions: Do NOT give if patient has a history of allergy to any local anesthetic or any \"tito\" product.   Apply 30 minutes prior to VAD insertion or port access.  MAX Dose:  2.5 g (  of 5 g tube)    Dispense Loc: [facility] 7C               lidocaine 1 % 1 mL  Dose: 1 mL  Freq: EVERY 1 HOUR PRN Route: OTHER  PRN Comment: mild pain with VAD insertion or accessing implanted port  Start: 18   Admin Instructions: Do NOT give if patient has a history of allergy to any local anesthetic or any \"tito\" product. MAX dose 1 mL subcutaneous OR intradermal in divided doses.    Admin. Amount: 1 mL  Dispense Loc: [facility]  Volume: 2 mL               melatonin tablet 1 mg  Dose: 1 mg  Freq: AT BEDTIME PRN Route: PO  PRN Reason: sleep  Start: 18   Admin Instructions: Do not give unless at least 6 hours of uninterrupted sleep is expected.    Admin. Amount: 1 tablet (1 × 1 mg tablet)  Dispense Loc: [facility]               mycophenolate (GENERIC EQUIVALENT) capsule 750 mg  Dose: 750 mg  Freq: 2 TIMES DAILY Route: PO  Start: 18   Admin Instructions: Check if BLOOD LEVEL is needed BEFORE administering dose.  This order specifically allows the use of GENERIC mycophenolate as an equivalent " of CELLCEPT capsules.    When possible, take 1 to 2 hours apart from any product containing magnesium or aluminum.    Admin. Amount: 3 capsule (3 × 250 mg capsule)  Last Admin: 11/10/18 0758  Dispense Loc: [facility]     1915 (750 mg)-Given        0839 (750 mg)-Given       1934 (750 mg)-Given        0738 (750 mg)-Given       1925 (750 mg)-Given        0836 (750 mg)-Given       2125 (500 mg)-Given [C]       2130 (250 mg)-Given [C]        1056 (750 mg)-Given       2021 (750 mg)-Given        0834 (750 mg)-Given       1951 (750 mg)-Given        0758 (750 mg)-Given       [ ] 2000           naloxone (NARCAN) injection 0.1-0.4 mg  Dose: 0.1-0.4 mg  Freq: EVERY 2 MIN PRN Route: IV  PRN Reason: opioid reversal  Start: 11/04/18 1705   Admin Instructions: For respiratory rate LESS than or EQUAL to 8.  Partial reversal dose:  0.1 mg titrated q 2 minutes for Analgesia Side Effects Monitoring Sedation Level of 3 (frequently drowsy, arousable, drifts to sleep during conversation).Full reversal dose:  0.4 mg bolus for Analgesia Side Effects Monitoring Sedation Level of 4 (somnolent, minimal or no response to stimulation).  For ordered IV doses 0.1-2mg give IVP. Give each 0.4mg over 15 seconds in emergency situations. For non-emergent situations further dilute in 9mL of NS to facilitate titration of response.    Admin. Amount: 0.1-0.4 mg = 0.25-1 mL Conc: 0.4 mg/mL  Dispense Loc: [facility]  Volume: 1 mL               omeprazole (priLOSEC) CR capsule 20 mg  Dose: 20 mg  Freq: EVERY MORNING Route: PO  Start: 11/04/18 1915   Admin. Amount: 1 capsule (1 × 20 mg capsule)  Last Admin: 11/10/18 0758  Dispense Loc: [facility]     1916 (20 mg)-Given        0839 (20 mg)-Given        0738 (20 mg)-Given        0836 (20 mg)-Given        1057 (20 mg)-Given        0834 (20 mg)-Given        0758 (20 mg)-Given           ondansetron (ZOFRAN-ODT) ODT tab 4 mg  Dose: 4 mg  Freq: EVERY 6 HOURS PRN Route: PO  PRN Reasons:  nausea,vomiting  Start: 11/04/18 1705   Admin Instructions: This is Step 1 of nausea and vomiting management.  If nausea not resolved in 15 minutes, go to Step 2 prochlorperazine (COMPAZINE). Do not push through foil backing. Peel back foil and gently remove. Place on tongue immediately. Administration with liquid unnecessary  With dry hands, peel back foil backing and gently remove tablet; do not push oral disintegrating tablet through foil backing; administer immediately on tongue and oral disintegrating tablet dissolves in seconds; then swallow with saliva; liquid not required.    Admin. Amount: 1 tablet (1 × 4 mg tablet)  Dispense Loc: [facility]              Or  ondansetron (ZOFRAN) injection 4 mg  Dose: 4 mg  Freq: EVERY 6 HOURS PRN Route: IV  PRN Reasons: nausea,vomiting  Start: 11/04/18 1705   Admin Instructions: This is Step 1 of nausea and vomiting management.  If nausea not resolved in 15 minutes, go to Step 2 prochlorperazine (COMPAZINE).  Irritant. For ordered IV doses 0.1-4 mg, give IV Push undiluted over 2-5 minutes.    Admin. Amount: 4 mg = 2 mL Conc: 4 mg/2 mL  Dispense Loc: [facility]  Infused Over: 2-5 Minutes  Volume: 2 mL               oxyCODONE IR (ROXICODONE) tablet 5-10 mg  Dose: 5-10 mg  Freq: EVERY 4 HOURS PRN Route: PO  PRN Reason: other  PRN Comment: pain control or improvement in physical function. Hold dose for analgesic side effects.  Start: 11/09/18 1230   Admin Instructions: Start with the lowest dose.  May adjust dose by 5 mg every 3 hours as needed. Notify provider to assess for uncontrolled pain or analgesic side effects. Hold while on PCA or with regular IV opioid dosing.    Admin. Amount: 1-2 tablet (1-2 × 5 mg tablet)  Last Admin: 11/10/18 0758  Dispense Loc: [facility]           0758 (5 mg)-Given           polyethylene glycol (MIRALAX/GLYCOLAX) Packet 17 g  Dose: 17 g  Freq: DAILY Route: PO  Start: 11/05/18 1230   Admin Instructions: Give in 8oz of  water, juice,  or soda. Hold for loose stools.  This is the second step of a three step constipation treatment.  1 Packet = 17 grams. Mixed prescribed dose in 8 ounces of water. Follow with 8 oz. of water.    Admin. Amount: 17 g  Last Admin: 11/09/18 0834  Dispense Loc: [facility]      1850 (17 g)-Given        0738 (17 g)-Given        0836 (17 g)-Given        1102 (17 g)-Given        0834 (17 g)-Given        (0759)-Not Given           predniSONE (DELTASONE) tablet 5 mg  Dose: 5 mg  Freq: DAILY Route: PO  Start: 11/04/18 1715   Admin. Amount: 1 tablet (1 × 5 mg tablet)  Last Admin: 11/10/18 0758  Dispense Loc: [facility]     1749 (5 mg)-Given        0839 (5 mg)-Given        0739 (5 mg)-Given        0836 (5 mg)-Given        1057 (5 mg)-Given        0834 (5 mg)-Given        0758 (5 mg)-Given           prochlorperazine (COMPAZINE) injection 10 mg  Dose: 10 mg  Freq: EVERY 6 HOURS PRN Route: IV  PRN Reasons: nausea,vomiting  Start: 11/04/18 1705   Admin Instructions: This is Step 2 of nausea and vomiting management. Give if nausea not resolved 15 minutes after giving ondansetron (ZOFRAN).  If nausea not resolved in 15 minutes, go to Step 3 metoclopramide (REGLAN), if ordered.  For ordered IV doses 0.1-10 mg, give IV Push undiluted. Each 5mg over 1 minute.    Admin. Amount: 10 mg = 2 mL Conc: 5 mg/mL  Dispense Loc: [facility]  Infused Over: 1-2 Minutes  Volume: 2 mL              Or  prochlorperazine (COMPAZINE) tablet 10 mg  Dose: 10 mg  Freq: EVERY 6 HOURS PRN Route: PO  PRN Reason: vomiting  Start: 11/04/18 1705   Admin Instructions: This is Step 2 of nausea and vomiting management. Give if nausea not resolved 15 minutes after giving ondansetron (ZOFRAN).  If nausea not resolved in 15 minutes, go to Step 3 metoclopramide (REGLAN), if ordered.    Admin. Amount: 2 tablet (2 × 5 mg tablet)  Dispense Loc: [facility]              Or  prochlorperazine (COMPAZINE) Suppository 25 mg  Dose: 25 mg  Freq: EVERY 12 HOURS PRN  Route: RE  PRN Reasons: nausea,vomiting  Start: 11/04/18 1705   Admin Instructions: This is Step 2 of nausea and vomiting management. Give if nausea not resolved 15 minutes after giving ondansetron (ZOFRAN).  If nausea not resolved in 15 minutes, go to Step 3 metoclopramide (REGLAN), if ordered.    Admin. Amount: 1 suppository (1 × 25 mg suppository)  Dispense Loc: [facility]               senna-docusate (SENOKOT-S;PERICOLACE) 8.6-50 MG per tablet 1 tablet  Dose: 1 tablet  Freq: 2 TIMES DAILY PRN Route: PO  PRN Reason: constipation  Start: 11/04/18 1705   Admin Instructions: If no bowel movement in 24 hours, increase to 2 tablets PO.  Hold for loose stools.  This is the first step of a three step constipation treatment.    Admin. Amount: 1 tablet  Dispense Loc: [facility]              Or  senna-docusate (SENOKOT-S;PERICOLACE) 8.6-50 MG per tablet 2 tablet  Dose: 2 tablet  Freq: 2 TIMES DAILY PRN Route: PO  PRN Reason: constipation  Start: 11/04/18 1705   Admin Instructions: Hold for loose stools.  This is the first step of a three step constipation treatment.    Admin. Amount: 2 tablet  Dispense Loc: [facility]               sodium chloride (PF) 0.9% PF flush 3 mL  Dose: 3 mL  Freq: EVERY 8 HOURS Route: IK  Start: 11/04/18 1715   Admin Instructions: And Q1H PRN, to lock peripheral IV dormant line.    Admin. Amount: 3 mL  Last Admin: 11/10/18 0956  Dispense Loc: [facility] Floor Stock  Volume: 3 mL     (1751)-Not Given        0029 (3 mL)-Given       1209 (3 mL)-Given       (1658)-Not Given        0351 (3 mL)-Given       0843 (3 mL)-Given       1636 (3 mL)-Given        0117 (3 mL)-Given       0843 (3 mL)-Given       1622 (3 mL)-Given        0239 (3 mL)-Given       1105 (3 mL)-Given       2023 (3 mL)-Given        (0251)-Not Given       0834 (3 mL)-Given       (1640)-Not Given        (0026)-Not Given       0956 (3 mL)-Given       [ ] 1715           sodium chloride (PF) 0.9% PF flush 3 mL  Dose: 3 mL  Freq:  EVERY 1 HOUR PRN Route: IK  PRN Reason: line flush  PRN Comment: for peripheral IV flush post IV meds  Start: 18 1705   Admin. Amount: 3 mL  Last Admin: 18 0258  Dispense Loc: [facility] Floor Stock  Volume: 3 mL          0258 (3 mL)-Given            sodium chloride (PF) 0.9% PF flush 5-50 mL  Dose: 5-50 mL  Freq: ONCE PRN Route: IK  PRN Reason: line flush  PRN Comment: to flush each lumen with line placement  Start: 11/10/18 0924   Admin Instructions: May repeat x 1    Admin. Amount: 5-50 mL  Dispense Loc: [facility] Floor Stock  Administrations Remainin  Volume: 50 mL              Completed Medications  Medications 11/04/18 11/05/18 11/06/18 11/07/18 11/08/18 11/09/18 11/10/18         Dose: 0.5-5 mL  Freq: ONCE PRN Route: OTHER  PRN Comment: mild pain For local anesthetic during PICC insertion.  Start: 11/10/18 0924   End: 11/10/18 1114   Admin Instructions: Give Sub-Q/Intradermal.  Give in divided doses as needed.    Admin. Amount: 0.5-5 mL  Last Admin: 11/10/18 1114  Dispense Loc: [facility] 7C  Administrations Remainin  Volume: 5 mL           1114 (3.5 mL)-Given             Dose: 10 mL  Freq: ONCE Route: SC  Start: 18   End: 18 1032   Admin. Amount: 10 mL  Last Admin: 18 103  Dispense Loc: [facility] 7C  Administrations Remainin  Volume: 10 mL         1032 (10 mL)-Given            Discontinued Medications  Medications 11/04/18 11/05/18 11/06/18 11/07/18 11/08/18 11/09/18 11/10/18         Dose: 2 g  Freq: EVERY 12 HOURS Route: IV  Indications Comment: intraspinous septic bursitis  Last Dose: 2 g (18 1239)  Start: 18 1045   End: 18 0817   Admin. Amount: 2 g  Last Admin: 18 0123  Dispense Loc: [facility]  Infused Over: 30 Minutes  Volume: 20 mL       1133 (2 g)-New Bag        0012 (2 g)-New Bag       1150 (2 g)-New Bag        0052 (2 g)-New Bag       1239 (2 g)-New Bag        0123 (2 g)-New Bag       0817-Med Discontinued          Dose:  2-5 mL  Freq: ONCE PRN Route: IK  PRN Reason: line flush  PRN Comment: for locking each dormant lumen with line placement  Start: 18   End: 11/10/18 0926   Admin Instructions: May repeat x 1    Admin. Amount: 2-5 mL  Dispense Loc: [facility]  Administrations Remainin  Volume: 5 mL           0926-Med Discontinued         Freq: ONCE PRN Route: Top  PRN Reason: moderate pain  PRN Comment: for local anesthetic during PICC insertion  Start: 18   End: 11/10/18 0926   Admin Instructions: Apply to PICC Insertion Site. Apply 30 minutes prior to procedure. MAX Dose: 2.5 gm  (1/2 of 5 gm tube)      Dispense Loc: [facility]  Administrations Remainin           0926-Med Discontinued         Dose: 0.5-5 mL  Freq: ONCE PRN Route: OTHER  PRN Comment: mild pain For local anesthetic during PICC insertion.  Start: 18   End: 11/10/18 0926   Admin Instructions: Give Sub-Q/Intradermal.  Give in divided doses as needed.    Admin. Amount: 0.5-5 mL  Dispense Loc: [facility]  Administrations Remainin  Volume: 5 mL           0926-Med Discontinued         Dose: 5-10 mg  Freq: EVERY 3 HOURS PRN Route: PO  PRN Reason: other  PRN Comment: pain control or improvement in physical function. Hold dose for analgesic side effects.  Start: 18   End: 18   Admin Instructions: Start with the lowest dose.  May adjust dose by 5 mg every 3 hours as needed. Notify provider to assess for uncontrolled pain or analgesic side effects. Hold while on PCA or with regular IV opioid dosing.    Admin. Amount: 1-2 tablet (1-2 × 5 mg tablet)  Last Admin: 18 08  Dispense Loc: [facility]      0527 (5 mg)-Given       0840 (5 mg)-Given       1414 (5 mg)-Given       1546 (5 mg)-Given        0843 (10 mg)-Given       1314 (10 mg)-Given       2126 (10 mg)-Given        0836 (10 mg)-Given       1156 (10 mg)-Given       2126 (5 mg)-Given        1102 (10 mg)-Given       1845 (10 mg)-Given         0834 (10 mg)-Given       1217-Med Discontinued          Dose: 5-50 mL  Freq: ONCE PRN Route: IK  PRN Reason: line flush  PRN Comment: to flush each lumen with line placement  Start: 18   End: 11/10/18 0926   Admin Instructions: May repeat x 1    Admin. Amount: 5-50 mL  Dispense Loc: [facility] Floor Stock  Administrations Remainin  Volume: 50 mL           0926-Med Discontinued    Medications 11/04/18 11/05/18 11/06/18 11/07/18 11/08/18 11/09/18 11/10/18               INTERAGENCY TRANSFER FORM - NOTES (H&P, Discharge Summary, Consults, Procedures, Therapies)   2018                       UNIT [address] [place] [address] BANK: [phone]               History & Physicals      H&P by Adi Shin MD at 2018  2:57 PM     Author:  Adi Shin MD Service:  Family Medicine Author Type:  Resident    Filed:  2018  4:38 PM Date of Service:  2018  2:57 PM Creation Time:  2018  2:57 PM    Status:  Attested :  Adi Shin MD (Resident)    Cosigner:  Mitzi Acosta DO at 2018  8:52 PM        Attestation signed by Mitzi Acosta DO at 2018  8:52 PM        Attestation:  This patient has been seen and evaluated by Mitzi higginbotham on 2018.  I saw and discussed the case with the primary resident and the care team. I agree with the findings and plan in this note. I have reviewed today's vital signs, medications, laboratory results and imaging results.     Mitzi Acosta DO  [facility]                               [facility] Medicine History and Physical        Date of Admission:  2018  Date of Service:[MC1.1] 2018[MC1.2]         HPI      Chief Complaint   Back pain and fever    History is obtained from the patient    History of Present Illness   Jerad Ross is a 58 year old male with a history of  donor renal transplant with a baseline creatinine of 0.8-1.1,  hypertension, coronary artery disease, chronic cough, anemia in chronic renal disease, vitamin D deficiency, chronic hepatitis B, basal cell carcinoma, squamous cell skin carcinoma, who presents for[MC1.1] acute back pain.  Patient says that 36 hours ago he developed acute back pain which worsened over the course of the day.  He tried to rest when he got up the pain was absolutely excruciating.  The pain is located in the middle of the lumbar spine, slightly worse on the left than the right.  He says he took some ibuprofen which did not seem to help the pain much.  He felt very hot and his wife took his temperature and it was 100.6.  He also had nausea and actually vomited secondary to the severe pain.  The pain subsided somewhat and he was able to get some sleep overnight but today felt the pain once again worsen.  This prompted his coming to the emergency department.  He once again had a fever of 100.8 in the emergency department.  He denies any radicular pain, urinary retention/incontinence, fecal incontinence, saddle anesthesia, weakness, numbness, tingling of his lower extremities.  He does feel that he is unable to walk due to the severe back pain.  He has never had pain like this.  He does have some intermittent history of some mild low back pain that is sporadic and usually associated with overuse but nothing like this current pain.[MC1.3]         History:   Review of Systems   The 10 point Review of Systems is negative other than noted in the HPI or here.[MC1.1]   Review of Systems   Constitutional: Negative for chills and fever.   HENT: Negative for rhinorrhea and sore throat.    Respiratory: Negative for cough and shortness of breath.    Cardiovascular: Negative for chest pain.   Gastrointestinal: Negative for abdominal pain, constipation, diarrhea, nausea and vomiting.   Endocrine: Negative for polydipsia and polyuria.   Genitourinary: Negative for dysuria.   Musculoskeletal: Positive for arthralgias  (Left hip and left knee acute on chronic pain) and back pain. Negative for myalgias.   Skin:        Multiple seborrhoic keratoses    Allergic/Immunologic: Negative for food allergies.   Neurological: Negative for light-headedness and headaches.   Hematological: Negative for adenopathy.   Psychiatric/Behavioral: Negative for dysphoric mood.[MC1.3]       Past Medical History    I have reviewed this patient's medical history and updated it with pertinent information if needed.[MC1.1]   Past Medical History:   Diagnosis Date     AION (acute ischemic optic neuropathy)      Anemia in chronic renal disease      Avascular necrosis of bones of both hips (H)     s/p bilateral hip replacements     Basal cell carcinoma      Chronic hepatitis B (H)      Clostridium difficile colitis      Coronary artery disease involving native coronary artery of native heart without angina pectoris 6/17/2014    Coronary angiogram 6/17/14: Severe distal 3-vessel disease involving small vessels, not amenable to PCI or CABG.     CRP elevated      Depression      Diverticulosis      Dyslipidemia      FSGS (focal segmental glomerulosclerosis)      Gastric ulcer with hemorrhage 2/12/12     GERD (gastroesophageal reflux disease)      Glaucoma     OHTN     Hemorrhoids      Hypertension secondary to other renal disorders      Hypogonadism in male      Immunosuppressed status (H)      Kidney replaced by transplant     focal glomerulosclerosis      NSTEMI (non-ST elevated myocardial infarction) (H) 6/17/2014     JULIANE (obstructive sleep apnea)     Doesn't use CPAP     Paracentral scotoma     LE     Secondary renal hyperparathyroidism (H)      Squamous cell carcinoma[MC1.4]     Reviewed    Past Surgical History   I have reviewed this patient's surgical history and updated it with pertinent information if needed.[MC1.1]  Past Surgical History:   Procedure Laterality Date     APPENDECTOMY       CATARACT IOL, RT/LT  4/19/2000    RE     CATARACT IOL, RT/LT   6/1/2000    LE     COLECTOMY SUBTOTAL  1983    10 cm, diverticulitis     COLONOSCOPY  2/13/2012    Procedure:COLONOSCOPY; Surgeon:SLOAN GALLARDO; Location: GI     ESOPHAGOSCOPY, GASTROSCOPY, DUODENOSCOPY (EGD), COMBINED  2/13/2012    Procedure:COMBINED ESOPHAGOSCOPY, GASTROSCOPY, DUODENOSCOPY (EGD); Surgeon:SLOAN GALLARDO; Location:[facility]     EXTRACAPSULAR CATARACT EXTRATION WITH INTRAOCULAR LENS IMPLANT  4-20-10, 6-1-10    Rt, Lt     HERNIA REPAIR  1995    Lt inguinal     HIP SURGERY      1981, bilat MITZI, revised 2001, 2005     KIDNEY SURGERY  1978 and 1993    transplant     KNEE SURGERY  1983, 1987    bilat TKA     MOHS MICROGRAPHIC PROCEDURE       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, auxiliary spleen     TONSILLECTOMY[MC1.5]      Reviewed    Social History[MC1.1]   Social History   Substance Use Topics     Smoking status: Former Smoker     Packs/day: 1.00     Years: 9.00     Quit date: 1/1/1988     Smokeless tobacco: Former User     Alcohol use 0.0 oz/week     0 Standard drinks or equivalent per week      Comment: rarely 1 drink per month[MC1.5]   Reviewed    Family History   I have reviewed this patient's family history and updated it with pertinent information if needed.[MC1.1]   Family History   Problem Relation Age of Onset     Cardiovascular Father      AI with valve repair     Hypertension Father      KIDNEY DISEASE Father      Cancer Paternal Grandmother      ovarian ca     Cerebrovascular Disease Paternal Grandfather      Cerebrovascular Disease Maternal Grandfather      KIDNEY DISEASE Paternal Aunt      Skin Cancer No family hx of      Glaucoma No family hx of      Macular Degeneration No family hx of      Amblyopia No family hx of[MC1.5]    Reviewed    Prior to Admission Medications   Prior to Admission Medications   Prescriptions Last Dose Informant Patient Reported? Taking?   BETA BLOCKER NOT PRESCRIBED, INTENTIONAL,   No No   Sig: Beta Blocker not prescribed intentionally due  to Bradycardia < 50 bpm without beta blocker therapy   BUDESONIDE, INHALATION, 90 MCG/ACT AEPB NOT STARTED  No No   Sig: Inhale 2 puffs into the lungs 2 times daily   FLUoxetine (PROZAC) 40 MG capsule 11/3/2018 at Unknown time  No Yes   Sig: Take 1 capsule (40 mg) by mouth every morning   Multiple Vitamins-Iron (ONE DAILY MULTIVITAMIN/IRON) TABS 11/3/2018 at Unknown time  Yes Yes   Sig: Take 1 tablet by mouth daily   Omega-3 Fatty Acids (OMEGA 3 PO)   Yes No   Sig: Take 1 capsule by mouth daily   acetaminophen (TYLENOL) 325 MG tablet 11/3/2018 at PM  Yes Yes   Sig: Take 1-2 tablets by mouth every 6 hours as needed.   albuterol (PROAIR HFA) 108 (90 Base) MCG/ACT Inhaler More than a month at Unknown time  No No   Sig: Inhale 2 puffs into the lungs every 6 hours as needed for shortness of breath / dyspnea or wheezing   amLODIPine (NORVASC) 5 MG tablet 11/3/2018 at Unknown time  No Yes   Sig: Take 1 tablet (5 mg) by mouth daily   aspirin 81 MG tablet 11/3/2018 at Unknown time  Yes Yes   Sig: Take 81 mg by mouth daily    atorvastatin (LIPITOR) 20 MG tablet 11/3/2018 at AM  No Yes   Sig: Take 1 tablet (20 mg) by mouth daily   calcium citrate-vitamin D (CITRACAL) 315-200 MG-UNIT TABS 11/3/2018 at Unknown time  Yes Yes   Sig: Take 1 tablet by mouth daily.   clopidogrel (PLAVIX) 75 MG tablet 11/3/2018 at Unknown time  No Yes   Sig: Take 1 tablet (75 mg) by mouth daily   entecavir (BARACLUDE) 0.5 MG tablet 11/3/2018 at Unknown time  No Yes   Sig: Take 1 tablet (0.5 mg) by mouth daily   fluticasone (FLONASE) 50 MCG/ACT spray More than a month at Unknown time  No No   Sig: Spray 1-2 sprays into both nostrils daily   fluticasone-salmeterol (ADVAIR) 250-50 MCG/DOSE diskus inhaler More than a month at Unknown time  No No   Sig: Inhale 1 puff into the lungs every 12 hours   ibuprofen (ADVIL,MOTRIN) 200 MG tablet 11/3/2018 at AM  Yes Yes   Sig: Take 200 mg by mouth every 4 hours as needed for mild pain   isosorbide mononitrate  (IMDUR) 30 MG 24 hr tablet 11/3/2018 at Unknown time  No Yes   Sig: Take 0.5 tablets (15 mg) by mouth daily   latanoprost (XALATAN) 0.005 % ophthalmic solution 11/2/2018 at PM  No No   Sig: Place 1 drop into both eyes At Bedtime   mycophenolate (GENERIC EQUIVALENT) 250 MG capsule 11/3/2018 at Unknown time  No Yes   Sig: Take 3 capsules (750 mg) by mouth 2 times daily   omeprazole (PRILOSEC OTC) 20 MG tablet   Yes No   Sig: Take  by mouth daily.   predniSONE (DELTASONE) 5 MG tablet 11/3/2018 at Unknown time  No Yes   Sig: Take 1 tablet (5 mg) by mouth daily   valACYclovir (VALTREX) 1000 mg tablet 11/3/2018 at Unknown time  No Yes   Sig: Take 1 tablet (1,000 mg) by mouth 3 times daily      Facility-Administered Medications: None     Allergies   Allergies   Allergen Reactions     Penicillins Shortness Of Breath and Hives     Keflex [Cephalexin Hcl] Other (See Comments)     Pt could not recall reaction     Tetracycline Other (See Comments)     Patient could not recall reaction     Sulfa Drugs Rash           Physical Exam      Vital Signs: Temp: 99.6  F (37.6  C) Temp src: Oral BP: 122/74   Heart Rate: 70 Resp: 16 SpO2: 96 % O2 Device: None (Room air)    Weight: 175 lbs 0 oz[MC1.1]    Physical Exam   Constitutional: He is oriented to person, place, and time. He appears well-developed and well-nourished. No distress.   HENT:   Head: Normocephalic and atraumatic.   Neck: Neck supple.   Cardiovascular: Normal rate, regular rhythm and normal heart sounds.    No murmur heard.  Pulmonary/Chest: Effort normal and breath sounds normal. No respiratory distress. He has no wheezes. He has no rales.   Abdominal: Soft. He exhibits no distension. There is no tenderness. There is no rebound and no guarding.   Musculoskeletal: He exhibits tenderness (Tenderness over L4 and L5 without masses or stepoffs.  Mild left paraspinal tenderness). He exhibits no edema.   Neurological: He is alert and oriented to person, place, and time. He has  normal strength. No cranial nerve deficit or sensory deficit. He exhibits normal muscle tone.   No clonus, no motor deficits upper or lower extremities.  Strength 5/5 bilateral lower extremities.  Rectal tone intact.   Skin: Skin is warm and dry.   Psychiatric: He has a normal mood and affect.[MC1.3]       Assessment & Plan      Jerad Ross is a 58 year old male admitted on 2018. He has a history of  donor renal transplant, skin cancer, chronic back pain, chronic anemia, vitamin D deficiency, essential hypertension, coronary artery disease and is admitted for L4-L5 infected interspinous bursal cyst with adjacent reactive epidural thickening and paraspinous cellulitis    # Acute back pain  # Infected L4-5 interspinous bursal cyst  # Paraspinous cellulitis  Unclear if this is the cause of the patient's current symptoms or an incidental finding.  However, given the patient's immunocompromised state on immunosuppressants status post kidney transplant, it is reasonable to treat empirically with antibiotics and consult neurosurgery.  Per the MR report there is no drainable fluid collection and this will likely be managed medically with antibiotics but will defer to neurosurgery.  Given the relative rarity of this diagnosis in conjunction with the patient's immunocompromise state it may be reasonable to involve transplant infectious disease for antibiotic choice and duration recommendations.  -Neurosurgery consulted, appreciate recommendations  - Possible IR aspiration for cell count, culture, gram stain  - IV pain control  - Continue vancomycin and ertapenem for now, consider deescalating when clinically appropriate  - Probiotic to decrease risk of antibiotic associated diarrhea and C. Diff infection  - Avoid ibuprofen/NSAIDS given renal transplant    # DDKT (Baseline creatinine 0.8-1.1)  - Continue CellCept, consider transplant nephrology consult to advise regarding immunosuppression given acute  infection  - Continue PTA prednisone 5mg daily, consider stress dose steroids if signs of sepsis or hypotension[MC1.1]  - Avoid nephrotoxic agents[MC1.3]    # Chronic Hepatitis B  Continue PTA entecavir    # Major Depressive Disorder  Continue PTA Fluoxetine    # CAD  # HTN  Continue PTA IMDUR, Plavix, ASA 81, Amlodipine, lipitor    # Restrictive pattern on PFTs  PFTs showing mildly restrictive pattern, seen by pulmonology who said it could be mild asthma.  - Continue PTA advair, PRN albuterol    # Pain Assessment:  Current Pain Score 11/4/2018   Patient currently in pain? yes   Pain score (0-10) -   Pain location Knee   Pain descriptors Sharp   - Jerad is experiencing pain due to lumbar intraspinous septic bursitis. Pain management was discussed and the plan was created in a collaborative fashion.  Jerad's response to the current recommendations: engaged  - Please see the plan for pain management as documented above        Diet:    Fluids:[MC1.1] NS @ 125[MC1.3]  DVT Prophylaxis:[MC1.1] Pneumatic Compression Devices, will plan on lovenox if no neurosurgery or IR intervention[MC1.3]  Code Status: Full Code    Disposition Plan   Expected discharge: 2 - 3 days; recommended to prior living arrangement once adequate pain management/ tolerating PO medications, antibiotic plan established, safe disposition plan/ TCU bed available and SIRS/Sepsis treated. Dispo:          Entered: Adi Shin 11/04/2018, 2:57 PM   Information in the above section will display in the discharge planner report.    The patient was discussed with Dr. [facility] Inpatient Service  [facility]   Pager: [phone]  Please see sticky note for cross cover information      Results:     Data[MC1.1]     Recent Labs  Lab 11/04/18  1214   WBC 9.6   HGB 12.5*   MCV 92         POTASSIUM 3.3*   CHLORIDE 107   CO2 27   BUN 10   CR 0.69   ANIONGAP 6   HEMA 8.3*   GLC 77[MC1.6]       Most  Recent 3 CBC's:[MC1.1]  Recent Labs   Lab Test  11/04/18   1214  10/23/18   1116  06/21/18   1154   WBC  9.6  7.8  8.1   HGB  12.5*  14.0  12.9*   MCV  92  90  88   PLT  307  352  332[MC1.6]     Most Recent 3 BMP's:[MC1.1]  Recent Labs   Lab Test  11/04/18   1214  10/23/18   1116  06/21/18   1154   NA  140  138  142   POTASSIUM  3.3*  3.7  3.8   CHLORIDE  107  103  107   CO2  27  28  27   BUN  10  9  11   CR  0.69  0.83  0.81   ANIONGAP  6  7  7   HEMA  8.3*  9.2  9.1   GLC  77  82  94[MC1.6]     Most Recent 3 INR's:[MC1.1]  Recent Labs   Lab Test  03/13/18   0934  09/12/17   0923  03/10/17   1014   INR  0.95  0.86  0.97[MC1.6]     Most Recent 6 glucoses:[MC1.1]  Recent Labs   Lab Test  11/04/18   1214  10/23/18   1116  06/21/18   1154  03/13/18   0934  09/12/17   0923  08/02/17   1412   GLC  77  82  94  87  93  126*[MC1.6]     Most Recent Urinalysis:[MC1.1]  Recent Labs   Lab Test  01/05/11   0916   COLOR  Yellow   APPEARANCE  Clear   URINEGLC  Negative   URINEBILI  Negative   URINEKETONE  Negative   SG  1.012   UBLD  Negative   URINEPH  6.0   PROTEIN  Negative   NITRITE  Negative   LEUKEST  Negative   RBCU  0   WBCU  0[MC1.6]     Most Recent ESR & CRP:[MC1.1]  Recent Labs   Lab Test  11/04/18   1214   SED  18   CRP  110.0*     Recent Results (from the past 24 hour(s))   MR Lumbar Spine w/o & w Contrast    Narrative    MR LUMBAR SPINE W/O & W CONTRAST 11/4/2018 1:46 PM    Provided History: lumbar back pain radiating to left hip, fevers,  immune suppressed, rule out osteomyelitis versus abscess; .    Comparison: Abdominal MRI 7/13/2015    Technique: Sagittal T1-weighted and T2-weighted and axial T2-weighted  images of the lumbar spine were obtained without intravenous contrast.  Post intravenous contrast using gadolinium axial and sagittal  T1-weighted images were obtained with fat saturation.    Contrast: 7.9CC GADAVIST     Findings:   5 lumbar-type vertebrae counting down from the presumed 12th ribs. The  tip the  conus medullaris is at L1-2. Cauda equina nerve roots and  visualized thoracic cord are normal.    Asymmetric left facet joint effusions and interspinous edema with 2 mm  dorsal epidural bursal cyst formation. There is abnormal enhancing  edema within the paraspinous musculature bilaterally at L2-pelvis. No  discrete drainable fluid collection. Abnormal epidural  thickening/enhancement L4-S1. No abnormal intrathecal enhancement.    Normal alignment of the lumbar vertebrae. Normal lumbar lordosis. Disc  degeneration at L1-2 with loss of disc height and intradiscal T2  signal. Severe asymmetric right psoas muscular atrophy. Multiple cm  nonenhancing well-circumscribed heterogeneous hemorrhagic signal focus  in the right perivertebral space in the expected location of the right  psoas muscle at the level of L5 measuring up to 2.7 cm in maximal  dimension, unchanged dating back to at least 7/13/2015, probable  chronic organized hematoma.     Findings on a level by level basis are as follows:    T12-L1: No spinal canal or neural foraminal stenosis.    L1-2: Disc bulge. Mild spinal canal stenosis. No significant neural  foraminal stenosis.    L2-3: No spinal canal or neural foraminal stenosis.    L3-4: Disc bulge. Mild bilateral neural foraminal stenosis. No  significant spinal canal stenosis.    L4-5: Disc bulge and facet arthrosis. Moderate bilateral neural  foraminal stenosis. No significant spinal canal stenosis.    L5-S1: Facet arthrosis. No spinal canal or neural foraminal stenosis.    Atrophic kidneys.      Impression    Impression:  1. Findings suspicious for infected L4-5 interspinous bursal cyst with  adjacent reactive epidural thickening and paraspinous cellulitis. No  drainable fluid collection.  2. Multilevel lumbar spondylosis. Moderate bilateral neural foraminal  stenosis at L4-5. Mild spinal canal stenosis at L1-2.  3. Multiple well-circumscribed hemorrhagic foci in the right  perivertebral soft tissues at  L5 in the location of a severely  atrophied right psoas muscle, unchanged dating back to at least  7/13/2015, suspected chronic organized hematoma.    [Result: Infected interspinous bursal cyst]    Finding was identified on 11/4/2018 1:37 PM.     Dr. Han was contacted by Dr. Lopez at 11/4/2018 1:56 PM and  verbalized understanding of the urgent finding.     ARVIN LOPEZ MD[MC1.6]                  Revision History        User Key Date/Time User Provider Type Action    > MC1.3 11/4/2018  4:38 PM Adi Shin MD Resident Sign     MC1.6 11/4/2018  3:29 PM Adi Shin MD Resident      MC1.5 11/4/2018  3:00 PM Adi Shin MD Resident      MC1.4 11/4/2018  2:59 PM Adi Shin MD Resident      MC1.2 11/4/2018  2:58 PM Adi Shin MD Resident      MC1.1 11/4/2018  2:57 PM Adi Shin MD Resident                      Discharge Summaries      Discharge Summaries by Pilar Bustamante MD at 11/10/2018 12:10 PM     Author:  Pilar Bustamante MD Service:  Family Medicine Author Type:  Resident    Filed:  11/10/2018 12:24 PM Date of Service:  11/10/2018 12:10 PM Creation Time:  11/8/2018  3:55 PM    Status:  Attested :  Pilar Bustamatne MD (Resident)    Cosigner:  Torsten Villanueva MD at 11/10/2018 12:43 PM        Attestation signed by Torsten Villanueva MD at 11/10/2018 12:43 PM        Attestation:  This patient has been seen and evaluated by Torsten higginbotham on 11/10/2018.  I saw and discussed the case with the primary resident and the care team. I agree with the findings and plan in this note. I have reviewed today's vital signs, medications, laboratory results.   21887 Inpatient discharge code >30 minutes I spent 35 minutes for this visit, discharge planning, seeing the patient, performing the discharge examination, preparing discharge records and prescriptions.  [facility]                                      [facility] Discharge Summary       Date of Admission:  2018  Date of Discharge:[MG1.1]  11/10/2018[MG1.2]  Date of Service:[MG1.1] 11/10/2018[MG1.2]  Discharging Attending Provider: Torsten Villanueva MD  Discharge Team: [facility] Service[MG1.1]    Discharge Diagnoses      Infection of lumbar spine (H)  Acute midline low back pain without sciatica  Septic bursitis  Impaired mobility  Immunosuppressed status (H)    Follow-ups Needed After Discharge[MG1.3]   - Recheck CRP[MG1.4], ESR, CBC with diff, CMP[VL1.1] in 2 weeks and at end of antibiotic course  - Follow up appointment with PCP within a week and then for recheck in ~3 weeks  - Remove PICC after antibiotic course is completed[MG1.4]    Hospital Course[MG1.3]   Jerad Ross is a 58 year old male with a 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 Lumbar spine MRI showed L4-5 interspinous bursal cyst with adjacent reactive epidural thickening and paraspinous cellulitis.[MG1.1] Transplant ID followed and recommended ceftriaxone. Neurosurgery consul[MG1.4]alexa, recommended no surgical intervention. IR[MG1.1] performed aspiration[MG1.4] of the cyst[MG1.1] on [MG1.4] to clarify diagnosis[MG1.1], but after several days of antibiotics, no growth was detected on cultures[MG1.4].[MG1.1] He remained on[MG1.4] ceftriaxone  ID[MG1.1] throughout hospitalization. PICC line was placed for continued outpatient IV ceftriaxone treatment with a total[MG1.4] planned[VL1.1] course of 4 weeks. He discharged to TCU for rehab on 11/10 in stable condition with improved pain.[MG1.4]    The following problems  were addressed during his hospitalization:    # Infected L4-5 interspinous bursal cyst  # Paraspinous cellulitis  Gram stain and culture[MG1.1] sterile to date[MG1.6].   -[MG1.1] Continue[MG1.6] ceftriaxone 2g IV daily for total of[MG1.1] 4[MG1.6] weeks of antibiotics ([MG1.1]through 12/4/18)[MG1.6]  -[MG1.1] P[MG1.6]ain control[MG1.1] with oxycodone[MG1.6] 5-10[MG1.3] mg q[MG1.6]6[MG1.3] hrs prn[MG1.6]  - Avoid ibuprofen/NSAIDS given renal transplant     #[MG1.1] Atelectasis  Patient had some crackles on exam but no evidence of pneumonia. Improved with use of incentive spirometer. Expect this will resolve with increased therapy and movement.[MG1.6]  -[MG1.1] Continue i[MG1.6]ncentive spirometry  -[MG1.1] F[MG1.6]requent walking as able      # DDKT (Baseline creatinine 0.8-1.1)  Cr 0.69-0.76 while in hospital. Transplant nephrology elected to continue CellCept at PTA dosing despite infection since it is a minimal dose.  - Continue CellCept[MG1.1] 750 mg BID[MG1.6]  - Continue prednisone 5mg daily  - Avoid nephrotoxic agents      # Chronic Hepatitis B  Continue PTA entecavir      # Major Depressive Disorder  Continue PTA Fluoxetine      # CAD  # HTN  Continue PTA IMDUR, Plavix, ASA 81, Amlodipine, lipitor      # Restrictive pattern on PFTs  PFTs showing mildly restrictive pattern, seen by pulmonology who said it could be mild asthma.  - Continue PTA advair, PRN albuterol    # Discharge Pain Plan:[MG1.1]  - During his hospitalization, Jerad experienced pain due to L4-5 interspinous bursal cyst.  The pain plan for discharge was discussed with Jerad and the plan was created in a collaborative fashion.    - Oxycodone[MG1.4] 5-10[MG1.3] mg q[MG1.4]6[MG1.3] hrs[MG1.4] prn    Consultations This Hospital Stay   PHARMACY TO DOSE VANCO  MEDICATION HISTORY IP PHARMACY CONSULT  OCCUPATIONAL THERAPY ADULT IP CONSULT  PHYSICAL THERAPY ADULT IP CONSULT  NEUROSURGERY ADULT IP CONSULT  INFECTIOUS DISEASE TRANSPLANT SOT ADULT IP  CONSULT  NEPHROLOGY KIDNEY/PANCREAS TRANSPLANT ADULT IP CONSULT  INTERVENTIONAL RADIOLOGY ADULT/PEDS IP CONSULT  VASCULAR ACCESS ADULT IP CONSULT  VASCULAR ACCESS ADULT IP CONSULT    Code Status[MG1.3]   Full Code       The patient was discussed with Dr. Villanueva.[MG1.1]    Kaelyn Salguero[MG1.3]  [facility]  [facility]   Pager: [phone]  ______________________________________________________________________[MG1.1]  Review of Systems   Constitutional: Negative for[MG1.5] chills[VL1.2] and[MG1.5] fever[VL1.2].   HENT: Positive for[MG1.5] sore throat[VL1.2].    Respiratory: Negative for[MG1.5] cough[VL1.2] and[MG1.5] shortness of breath[VL1.2].    Cardiovascular: Negative for[MG1.5] chest pain[VL1.2].   Musculoskeletal: Positive for[MG1.5] back pain[VL1.2].[MG1.5]     Physical Exam[MG1.3]   Vital Signs:[MG1.1] Temp: 97.1  F (36.2  C) Temp src: Oral BP: 124/84 Pulse: 58   Resp: 20 SpO2: 99 % O2 Device: None (Room air)[MG1.3]    Weight:[MG1.1] 181 lbs 12.8 oz[MG1.3]    Physical Exam[MG1.1]   General: laying in bed, no acute distress  HEENT: normocephalic, atraumatic  Respiratory: lungs with mild crackles in the bases bilaterally, no wheezes  Cardiac: regular rate and rhythm, S1/S2 heard, no murmurs, rubs, or gallops  Abdominal: non-distended, normal bowel sounds, soft, non-tender to palpation in all quadrants  Musculoskeletal: no tenderness to palpation of lumbar spinal region of back  Skin: many brown, raised, ~2 mm lesions throughout all exposed skin areas  Neurologic: cranial nerves grossly intact  Psychiatric: appropriate mood and affect[VL1.2]      Significant Results and Procedures[MG1.3]   Results for orders placed or performed during the hospital encounter of 11/04/18   MR Lumbar Spine w/o & w Contrast    Narrative    MR LUMBAR SPINE W/O & W CONTRAST 11/4/2018 1:46 PM    Provided History: lumbar back pain radiating to left hip, fevers,  immune suppressed, rule  out osteomyelitis versus abscess; .    Comparison: Abdominal MRI 7/13/2015    Technique: Sagittal T1-weighted and T2-weighted and axial T2-weighted  images of the lumbar spine were obtained without intravenous contrast.  Post intravenous contrast using gadolinium axial and sagittal  T1-weighted images were obtained with fat saturation.    Contrast: 7.9CC GADAVIST     Findings:   5 lumbar-type vertebrae counting down from the presumed 12th ribs. The  tip the conus medullaris is at L1-2. Cauda equina nerve roots and  visualized thoracic cord are normal.    Asymmetric left facet joint effusions and interspinous edema with 2 mm  dorsal epidural bursal cyst formation. There is abnormal enhancing  edema within the paraspinous musculature bilaterally at L2-pelvis. No  discrete drainable fluid collection. Abnormal epidural  thickening/enhancement L4-S1. No abnormal intrathecal enhancement.    Normal alignment of the lumbar vertebrae. Normal lumbar lordosis. Disc  degeneration at L1-2 with loss of disc height and intradiscal T2  signal. Severe asymmetric right psoas muscular atrophy. Multiple cm  nonenhancing well-circumscribed heterogeneous hemorrhagic signal focus  in the right perivertebral space in the expected location of the right  psoas muscle at the level of L5 measuring up to 2.7 cm in maximal  dimension, unchanged dating back to at least 7/13/2015, probable  chronic organized hematoma.     Findings on a level by level basis are as follows:    T12-L1: No spinal canal or neural foraminal stenosis.    L1-2: Disc bulge. Mild spinal canal stenosis. No significant neural  foraminal stenosis.    L2-3: No spinal canal or neural foraminal stenosis.    L3-4: Disc bulge. Mild bilateral neural foraminal stenosis. No  significant spinal canal stenosis.    L4-5: Disc bulge and facet arthrosis. Moderate bilateral neural  foraminal stenosis. No significant spinal canal stenosis.    L5-S1: Facet arthrosis. No spinal canal or neural  foraminal stenosis.    Atrophic kidneys.      Impression    Impression:  1. Findings suspicious for infected L4-5 interspinous bursal cyst with  adjacent reactive epidural thickening and paraspinous cellulitis. No  drainable fluid collection.  2. Multilevel lumbar spondylosis. Moderate bilateral neural foraminal  stenosis at L4-5. Mild spinal canal stenosis at L1-2.  3. Multiple well-circumscribed hemorrhagic foci in the right  perivertebral soft tissues at L5 in the location of a severely  atrophied right psoas muscle, unchanged dating back to at least  7/13/2015, suspected chronic organized hematoma.    [Result: Infected interspinous bursal cyst]    Finding was identified on 11/4/2018 1:37 PM.     Dr. Han was contacted by Dr. Carpenter at 11/4/2018 1:56 PM and  verbalized understanding of the urgent finding.     ARVIN CARPENTER MD   XR Lumbar Puncture Therapeutic    Narrative    FLUOROSCOPY GUIDED LUMBAR SPINE ASPIRATION    History: Patient with history of L4-L5 septic bursa cyst  aspirate/tissue culture here for fluoroscopy guided aspiration.    Radiologists: LAUREL Carpenter MD ; EMA Carbone MD    Fluoroscopy time: 30 seconds     IV contrast: None.    Complications: None.    Consent: Informed written and verbal consent obtained.    Procedure/Findings: The patient was placed on the fluoroscopy table  and the prone position. The L4-5 interspinous space was identified  under fluoroscopy. The overlying skin was prepped and draped in the  usual sterile fashion. Approximately 10 ml 1% lidocaine was used for  local anesthesia. Under fluoroscopic guidance , a 20 gauge spinal  needle was advanced into the interspinous space at L4-5. Aspiration  was performed and approximately 0.25 mL of viscous fluid was obtained.  The needle was removed and immediate hemostasis was achieved. No  immediate complication.       Impression    Impression: Uncomplicated fluoroscopic-guided aspiration of L4-5  interspinous bursal cyst.    I,  ARVIN LOPEZ MD, attest that I was present in the procedure  room for the entire procedure.    I have personally reviewed the examination and initial interpretation  and I agree with the findings.    ARVIN LOPEZ MD[MG1.7]       Pending Results[MG1.3]   These results will be followed up by[MG1.1] PCP[MG1.4]  Unresulted Labs Ordered in the Past 30 Days of this Admission     Date and Time Order Name Status Description    11/7/2018 0954 Fluid Culture Aerobic Bacterial Preliminary              Primary Care Physician   Aston Mendoza [facility]    Discharge Disposition[MG1.3]   Discharged to rehabilitation facility[MG1.1]: [facility][MG1.4]  Condition at discharge: Stable[MG1.1]    Discharge Orders     General info for SNF   Length of Stay Estimate: Short Term Care: Estimated # of Days <30    Condition at Discharge: Improving  Level of care:skilled   Rehabilitation Potential: Good  Admission H&P remains valid and up-to-date: Yes  Recent Chemotherapy: N/A  Use Nursing Home Standing Orders: N/A     Mantoux instructions   Give two-step Mantoux (PPD) Per Facility Policy Yes     Reason for your hospital stay   You were hospitalized for infected bursa in your back.  You were treated with IV antibiotics and improved clinically.  You were improving and stable at the time of discharge     Follow Up and recommended labs and tests   Follow up with [facility] physician.  The following labs/tests are recommended: Bi-Weekly CRP and CBC, Follow up in 2 weeks with primary care provider     Activity - Up ad muriel     Full Code     Advance Diet as Tolerated   Follow this diet upon discharge: Orders Placed This Encounter     Regular Diet Adult       Discharge Medications   Current Discharge Medication List      START taking these medications    Details   cefTRIAXone (ROCEPHIN) 2 GM vial Inject 2 g into the vein every 24 hours  Qty: 10 each    Associated Diagnoses: Infection of lumbar spine (H); Septic bursitis      docusate  sodium (COLACE) 100 MG capsule Take 1 capsule (100 mg) by mouth 2 times daily  Qty: 60 capsule    Associated Diagnoses: Infection of lumbar spine (H); Acute midline low back pain without sciatica      heparin lock flush 10 UNIT/ML SOLN injection 2-5 mLs by Intracatheter route once as needed for line flush (for locking each dormant lumen with line placement)    Associated Diagnoses: Septic bursitis; Infection of lumbar spine (H)      oxyCODONE IR (ROXICODONE) 5 MG tablet Take 1-2 tablets (5-10 mg) by mouth every 6 hours as needed for moderate to severe pain  Qty: 19 tablet, Refills: 0    Associated Diagnoses: Infection of lumbar spine (H)      polyethylene glycol (MIRALAX/GLYCOLAX) Packet Take 17 g by mouth daily  Qty: 7 packet    Associated Diagnoses: Infection of lumbar spine (H); Septic bursitis         CONTINUE these medications which have NOT CHANGED    Details   acetaminophen (TYLENOL) 325 MG tablet Take 1-2 tablets by mouth every 6 hours as needed.      amLODIPine (NORVASC) 5 MG tablet Take 1 tablet (5 mg) by mouth daily  Qty: 90 tablet, Refills: 1    Associated Diagnoses: Acute chest pain      aspirin 81 MG tablet Take 81 mg by mouth daily       atorvastatin (LIPITOR) 20 MG tablet Take 1 tablet (20 mg) by mouth daily  Qty: 90 tablet, Refills: 1    Associated Diagnoses: NSTEMI (non-ST elevated myocardial infarction) (H)      calcium citrate-vitamin D (CITRACAL) 315-200 MG-UNIT TABS Take 1 tablet by mouth daily.      CHOLECALCIFEROL PO Take 1 tablet by mouth daily Dose unknown      clopidogrel (PLAVIX) 75 MG tablet Take 1 tablet (75 mg) by mouth daily  Qty: 90 tablet, Refills: 1    Associated Diagnoses: NSTEMI (non-ST elevated myocardial infarction) (H); Coronary artery disease involving native coronary artery of native heart without angina pectoris      entecavir (BARACLUDE) 0.5 MG tablet Take 1 tablet (0.5 mg) by mouth daily  Qty: 90 tablet, Refills: 3    Associated Diagnoses: Chronic viral hepatitis B  without delta agent and without coma (H); Chronic hepatitis B (H)      FLUoxetine (PROZAC) 40 MG capsule Take 1 capsule (40 mg) by mouth every morning  Qty: 90 capsule, Refills: 1    Associated Diagnoses: Major depressive disorder, recurrent episode, moderate (H)      isosorbide mononitrate (IMDUR) 30 MG 24 hr tablet Take 0.5 tablets (15 mg) by mouth daily  Qty: 45 tablet, Refills: 1    Associated Diagnoses: Acute chest pain      Multiple Vitamins-Iron (ONE DAILY MULTIVITAMIN/IRON) TABS Take 1 tablet by mouth daily      mycophenolate (GENERIC EQUIVALENT) 250 MG capsule Take 3 capsules (750 mg) by mouth 2 times daily  Qty: 180 capsule, Refills: 11    Associated Diagnoses: Kidney transplanted      Omega-3 Fatty Acids (OMEGA 3 PO) Take 1 capsule by mouth daily Dose unknown      omeprazole (PRILOSEC OTC) 20 MG tablet Take  by mouth daily.  Qty: 30 tablet    Associated Diagnoses: Chronic hepatitis B (H); HTN (hypertension); Depression; Unspecified essential hypertension      predniSONE (DELTASONE) 5 MG tablet Take 1 tablet (5 mg) by mouth daily  Qty: 90 tablet, Refills: 3    Associated Diagnoses: Kidney transplanted      albuterol (PROAIR HFA) 108 (90 Base) MCG/ACT Inhaler Inhale 2 puffs into the lungs every 6 hours as needed for shortness of breath / dyspnea or wheezing  Qty: 1 Inhaler, Refills: 2    Comments: Dispense 18g  Associated Diagnoses: Cough; Wheezing      BETA BLOCKER NOT PRESCRIBED, INTENTIONAL, Beta Blocker not prescribed intentionally due to Bradycardia < 50 bpm without beta blocker therapy  Refills: 0    Associated Diagnoses: NSTEMI (non-ST elevated myocardial infarction) (H)      BUDESONIDE, INHALATION, 90 MCG/ACT AEPB Inhale 2 puffs into the lungs 2 times daily  Qty: 1 each, Refills: 3    Associated Diagnoses: Asthma      fluticasone (FLONASE) 50 MCG/ACT spray Spray 1-2 sprays into both nostrils daily  Qty: 3 Bottle, Refills: 11    Associated Diagnoses: Bronchitis with bronchospasm       fluticasone-salmeterol (ADVAIR) 250-50 MCG/DOSE diskus inhaler Inhale 1 puff into the lungs every 12 hours  Qty: 60 Inhaler, Refills: 1    Associated Diagnoses: Cough      latanoprost (XALATAN) 0.005 % ophthalmic solution Place 1 drop into both eyes At Bedtime  Qty: 3 Bottle, Refills: 3    Associated Diagnoses: Borderline glaucoma with ocular hypertension, bilateral           Allergies   Allergies   Allergen Reactions     Penicillins Shortness Of Breath and Hives     Keflex [Cephalexin Hcl] Other (See Comments)     Pt could not recall reaction     Tetracycline Other (See Comments)     Patient could not recall reaction     Sulfa Drugs Rash[MG1.3]       I was present with the medical student who participated in the service and in the documentation of this note. I have verified the history and personally performed the physical exam and medical decision making, and have verified the content of the note, which accurately reflects my assessment of the patient and the plan of care.[VL1.2]   Pilar Bustamante MD[VL1.3]            Revision History        User Key Date/Time User Provider Type Action    > VL1.3 11/10/2018 12:24 PM Pilar Bustamante MD Resident Sign     VL1.2 11/10/2018 12:21 PM Pilar Bustamante MD Resident Share     VL1.1 11/10/2018 12:18 PM Pilar Bustamante MD Resident      MG1.3 11/10/2018 12:12 PM Kaelyn Salguero Medical Student Share     MG1.2 11/10/2018  8:29 AM Kaelyn Salguero Medical Student      MG1.6 11/10/2018  8:21 AM Kaelyn Salguero Medical Student      MG1.4 11/10/2018  7:34 AM Kaelyn Salguero Medical Student      MG1.5 11/10/2018  7:29 AM Kaelyn Salguero Medical Student      MG1.7 11/8/2018  3:57 PM Kaelyn Salguero Medical Student      MG1.1 11/8/2018  3:55 PM Kaelyn Salguero Medical Student                      Consult Notes      Consults by Luigi Frost MD at 11/4/2018  3:51 PM     Author:  Luigi Frost MD Service:  Neurosurgery Author Type:   Resident    Filed:  11/4/2018  9:09 PM Date of Service:  11/4/2018  3:51 PM Creation Time:  11/4/2018  3:51 PM    Status:  Attested :  Luigi Frost MD (Resident)    Cosigner:  Santana Prajapati MD at 11/8/2018 11:52 AM         Consult Orders:    1. Neurosurgery Adult IP Consult: Patient to be seen: Routine within 24 hrs; Call back #: pager [phone]; Spoke with ED doc, but hasn't been seen. MRI concerning for L4-L5 infected interspinous bursal cyst without cord compression.; Consulta... [[id number]] ordered by Lina Cedeno MD at 11/04/18 1934           Attestation signed by Santana Prajapati MD at 11/8/2018 11:52 AM        Attestation:  Physician Attestation   I did not see the patient on this date.    Santana Prajapati MD                               [facility]       NEUROSURGERY CONSULTATION NOTE    This consultation was requested by [MT1.1] Byron[MT1.2] from the[MT1.1] ED[MT1.2] service.    Reason for Consultation[MT1.1]: L4-5 infected spinous process bursal cyst[MT1.2]    HPI:[MT1.1]  Mr. Ross is a 57 yo M with PMH kidney transplant 1993 on chronic immunosuppression with mycophenolate and prednisone, asthma, NSTEMI, chronic hepatitis B, arthritis presenting with low back pain. Patient states he has been having increased low back pain for the past 1 day without any known triggering events. Has some L > R hip pain, but it is mild, and he has BL hip replacements with occasional flares. States fever of 100.6 measured at home last night. History of shingles in 8/2018. Denies any radiating pain down the legs, or weakness, numbness, bowel/bladder symptoms.[MT1.2]      PAST MEDICAL HISTORY:[MT1.1]   Past Medical History:   Diagnosis Date     AION (acute ischemic optic neuropathy)      Anemia in chronic renal disease      Avascular necrosis of bones of both hips (H)     s/p bilateral hip replacements     Basal cell carcinoma      Chronic  hepatitis B (H)      Clostridium difficile colitis      Coronary artery disease involving native coronary artery of native heart without angina pectoris 6/17/2014    Coronary angiogram 6/17/14: Severe distal 3-vessel disease involving small vessels, not amenable to PCI or CABG.     CRP elevated      Depression      Diverticulosis      Dyslipidemia      FSGS (focal segmental glomerulosclerosis)      Gastric ulcer with hemorrhage 2/12/12     GERD (gastroesophageal reflux disease)      Glaucoma     OHTN     Hemorrhoids      Hypertension secondary to other renal disorders      Hypogonadism in male      Immunosuppressed status (H)      Kidney replaced by transplant     focal glomerulosclerosis      NSTEMI (non-ST elevated myocardial infarction) (H) 6/17/2014     JULIANE (obstructive sleep apnea)     Doesn't use CPAP     Paracentral scotoma     LE     Secondary renal hyperparathyroidism (H)      Squamous cell carcinoma[MT1.3]        PAST SURGICAL HISTORY:[MT1.1]   Past Surgical History:   Procedure Laterality Date     APPENDECTOMY       CATARACT IOL, RT/LT  4/19/2000    RE     CATARACT IOL, RT/LT  6/1/2000    LE     COLECTOMY SUBTOTAL  1983    10 cm, diverticulitis     COLONOSCOPY  2/13/2012    Procedure:COLONOSCOPY; Surgeon:SLOAN GALLARDO; Location: GI     ESOPHAGOSCOPY, GASTROSCOPY, DUODENOSCOPY (EGD), COMBINED  2/13/2012    Procedure:COMBINED ESOPHAGOSCOPY, GASTROSCOPY, DUODENOSCOPY (EGD); Surgeon:SLOAN GALLARDO; Location: GI     EXTRACAPSULAR CATARACT EXTRATION WITH INTRAOCULAR LENS IMPLANT  4-20-10, 6-1-10    Rt, Lt     HERNIA REPAIR  1995    Lt inguinal     HIP SURGERY      1981, bilat MITZI, revised 2001, 2005     KIDNEY SURGERY  1978 and 1993    transplant     KNEE SURGERY  1983, 1987    bilat TKA     MOHS MICROGRAPHIC PROCEDURE       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, auxiliary spleen     TONSILLECTOMY[MT1.3]         FAMILY HISTORY:[MT1.1]   Family History   Problem Relation Age of Onset      Cardiovascular Father      AI with valve repair     Hypertension Father      KIDNEY DISEASE Father      Cancer Paternal Grandmother      ovarian ca     Cerebrovascular Disease Paternal Grandfather      Cerebrovascular Disease Maternal Grandfather      KIDNEY DISEASE Paternal Aunt      Skin Cancer No family hx of      Glaucoma No family hx of      Macular Degeneration No family hx of      Amblyopia No family hx of[MT1.3]        SOCIAL HISTORY:[MT1.1]   Social History   Substance Use Topics     Smoking status: Former Smoker     Packs/day: 1.00     Years: 9.00     Quit date: 1/1/1988     Smokeless tobacco: Former User     Alcohol use 0.0 oz/week     0 Standard drinks or equivalent per week      Comment: rarely 1 drink per month[MT1.3]       MEDICATIONS:[MT1.1]  No current outpatient prescriptions on file.[MT1.3]       Allergies:[MT1.1]  Allergies   Allergen Reactions     Penicillins Shortness Of Breath and Hives     Keflex [Cephalexin Hcl] Other (See Comments)     Pt could not recall reaction     Tetracycline Other (See Comments)     Patient could not recall reaction     Sulfa Drugs Rash[MT1.3]       ROS: 10 point ROS of systems including Constitutional, Eyes, Respiratory, Cardiovascular, Gastroenterology, Genitourinary, Integumentary, Muscularskeletal, Psychiatric were all negative except for pertinent positives noted in my HPI.    Physical exam:[MT1.1]   Blood pressure 124/68, pulse 71, temperature 99.4  F (37.4  C), temperature source Oral, resp. rate 16, weight 82.5 kg (181 lb 14.1 oz), SpO2 95 %.[MT1.3]  CV:[MT1.1] heart rate as above[MT1.2]  PULM: breathing comfortably on[MT1.1] room air[MT1.2]  NEUROLOGIC:  -- Awake; Alert; oriented x 3  -- Follows commands briskly  -- Speech fluent, spontaneous. No aphasia or dysarthria.  -- no gaze preference. No apparent hemineglect.  Cranial Nerves:  -- visual fields full to confrontation, PERRL, extraocular movements intact  -- face symmetrical, tongue midline  --  sensory V1-V3 intact bilaterally  -- palate elevates symmetrically, uvula midline  -- hearing grossly intact bilat  -- Trapezii 5/5 strength bilat symmetric  -- Cerebellar: Finger nose finger without dysmetria    Motor:  Normal bulk / tone; no tremor, rigidity, or bradykinesia.  No muscle wasting or fasciculations  No Pronator Drift     Delt Bi Tri Hand Flexion/  Extension Iliopsoas Quadriceps Hamstrings Tibialis Anterior Gastroc    C5 C6 C7 C8/T1 L2 L3 L4-S1 L4 S1   R 5 5 5 5 5 5 5 5 5   L 5 5 5 5 5 5 5 5 5   Sensory:  intact to LT x 4 extremities     Reflexes:     Bi Tri BR Ching Pat Ach Bab    C5-6 C7-8 C6 UMN L2-4 S1 UMN   R 2+ 2+ 2+ Norm 2+ 2+ Norm   L 2+ 2+ 2+ Norm 2+ 2+ Norm     Gait: No[MT1.1]t assessed.[MT1.2]      IMAGING:[MT1.1]  MRI lumbar spine 11/4/18:  1. Findings suspicious for infected L4-5 interspinous bursal cyst with adjacent reactive epidural thickening and paraspinous cellulitis. No drainable fluid collection.  2. Multilevel lumbar spondylosis. Moderate bilateral neural foraminal stenosis at L4-5. Mild spinal canal stenosis at L1-2.  3. Multiple well-circumscribed hemorrhagic foci in the right perivertebral soft tissues at L5 in the location of a severely atrophied right psoas muscle, unchanged dating back to at least 7/13/2015, suspected chronic organized hematoma.[MT1.2]    LABS:[MT1.1]   -   - ESR, lactate, WBC WNL[MT1.2]    ASSESSMENT:[MT1.1]    # L4-5 interspinous bursal cyst, possibly infected[MT1.2]    RECOMMENDATIONS:  - No neurosurgical intervention indicated at this time[MT1.1]   - Please continue with antibiotics and current cares per primary team.[MT1.2]    The patient was discussed with [MT1.1] Malachi[MT1.2], neurosurgery chief resident, and he agrees with the above.[MT1.1]    Luigi Frost MD  Neurosurgery Resident PGY1[MT1.2]           Revision History        User Key Date/Time User Provider Type Action    > MT1.3 11/4/2018  9:09 PM Luigi Frost MD Resident Sign     MT1.2  11/4/2018  8:59 PM Luigi Frost MD Resident      MT1.1 11/4/2018  3:51 PM Luigi Frost MD Resident             Consults by Regina Hager RPA at 11/7/2018 10:45 AM     Author:  Regina Hager RPA Service:  Interventional Radiology Author Type:  Radiology Practioner Assistant    Filed:  11/7/2018 10:45 AM Date of Service:  11/7/2018 10:45 AM Creation Time:  11/7/2018 10:43 AM    Status:  Signed :  Regina Hager RPA (Radiology Practioner Assistant)     Consult Orders:    1. Interventional Radiology Adult/Peds IP Consult: Patient to be seen: Routine within 24 hours; Call back #: [phone]; L4L5 septic bursa cyst aspirate/tissue culture, per ID request; Neurosurgery deferred to IR [[id number]] ordered by Adi Shin MD at 11/06/18 1504                    Patient is on Neuroradiology urombbzc08/7/2018   for a L4L5 septic bursa cyst aspirate/tissue culture.   Consent will be done prior to procedure.     Please contact the IR [place] RN [place] [phone] for estimated time of procedure.     Regina Hager IR RPA  [phone] Call pager  [phone] pager[SY1.1]               Revision History        User Key Date/Time User Provider Type Action    > SY1.1 11/7/2018 10:45 AM Regina Hager RPA Radiology Practioner Assistant Sign            Consults by Sheila Chavira MD at 11/5/2018  1:33 PM     Author:  Shelia Chavira MD Service:  General Medicine Author Type:  Resident    Filed:  11/5/2018  4:28 PM Date of Service:  11/5/2018  1:33 PM Creation Time:  11/5/2018  1:33 PM    Status:  Attested :  Sheila Chavira MD (Resident)    Cosigner:  Krishna Frye MD at 11/6/2018  2:42 PM         Consult Orders:    1. Infectious Disease Transplant SOT Adult IP Consult: Patient to be seen: Routine within 24 hrs; Call back #: [phone]; Infected intraspinous bursal cyst with paraspinous cellulitis in renal transplant patient (1993) [[id number]] ordered  by Adi Shin MD at 11/05/18 0906           Attestation signed by Krishna Frye MD at 11/6/2018  2:42 PM        Transplant Infectious Disease Consult Attending Attestation:   Mr. Ross  was seen and evaluated by me, Krishna Frye MD. The case was discussed at length with the Medicine ID Resident, Dr. Chavira -- I agree with her consultative history and examination, assessment and recommendations in this inpatient Transplant ID Consult note. This note reflects our joint decision-making and I have reviewed today's vital signs, medications, labs and imaging with Dr. Chavira.  The case was discussed with the [place]'s Medicine team.      Krishna Frye MD  Pager [phone]                               [facility]  Transplant Infectious Disease Consult Note - New Patient     Patient:  Jerad Ross  YOB: 1960  Medical record number: [id number]  Consult requested by [CB1.1] Adi Shin[CB1.2] for evaluation of[CB1.1] infected intraspinous bursal cyst with paraspinous cellulitis in renal transplant patient[CB1.2]          Assessment and Recommendations:   Recommendations:    -[CB1.1] Please obtain[CB1.2] MRSA nares[CB1.1].[CB1.2]  - Trend CRP every other day[CB1.1] times three[CB1.2] with need for further trending to be reassessed on end date[CB1.1].[CB1.2]  -[CB1.1] Disc[CB1.2]ontinue vancomycin and ertapenem[CB1.1].  - Start ceftriaxone at meningitic dosing 2 g IV BID for two days. After two days ceftriaxone at meningitic dosing, may reduce to ceftriaxone 2 g IV daily dosing.  - Will follow blood cultures along with you.[CB1.2]  - Will discuss possibility of needle biopsy with neurosugery to obtain culture data for targeted antimicrobial therapy[CB1.1].[CB1.2]  - Patient will likely require 3-[CB1.1]6[CB1.2] weeks of antimicrobial therapy,[CB1.1] with exact duration of therapy to be determined by level of suspicion for bony  involvement.[CB1.2]    Thank you very much for this consultation. Transplant Infectious Disease will continue to follow with you.    Assessment:    Infectious Disease issues include:[CB1.1]    # Infected intraspinous bursal cyst with paraspinous cellulitis   MRI spine with evidence of infection in interspinous bursal cyst with reactive epidural thickening and paraspinous cellulitis without obvious drainable fluid. No leukocytosis (WBC 9.6), lactate 1.1, CRP elevated at 110-->160. Patient currently on vanc/ertapenem. As concern for true penicillin allergy is low in setting of possible childhood reaction and recent tolerance of cephalosporins, may discontinue vanc and ertapenem and transition to ceftriaxone. As current infection does not appear to invade CNS, would recommend short course at meningitic dosing to ensure adequate CNS penetration prior to transition to ceftriaxone 2 g daily dosing. Blood cultures have been negative thus far. Ideally, would obtain culture data from needle biopsy of cyst to further target antimicrobial therapy. Will plan to discuss possibility of biopsy with neurosurgery.[CB1.2]      - QTc interval:[CB1.1] No recent EKG available[CB1.2]   - Bacterial prophylaxis:[CB1.1]  None[CB1.2]   - Pneumocystis prophylaxis:[CB1.1] None[CB1.2]   - Fungal prophylaxis:[CB1.1] None[CB1.2]   - Isolation status: Good hand hygiene.      The above patient was seen and plan discussed with the attending, [CB1.1] Uday[CB1.2].    Sheila Chavira  PGY-1, Internal Medicine  P: [phone]         History of Infectious Disease Illness:     Gael Ross is a 58-year-old male with a past medical history of ESRD (secondary to idiopathic FSGS status post  donor kidney transplant in  with subsequent rejection and retransplant in ) on chronic immunosuppression with mycophenolate and prednisone, chronic hepatitis B on entecavir, CAD, hypertension, avascular necrosis of bilateral hip replacements in   status post revision in 2001 and 2005 who presented with 2 days acute onset lower back pain with radiation to the bilateral hips that limited mobility, is worse with weightbearing, and improves with lying on his side.  Patient reports feeling hot and taking his temperature at home with low-grade temperatures of approximately 100.6.  Patient says that intense pain precipitated nausea and emesis.  He denies any fevers or chills.  He further denies any tingling, numbness, urinary or fecal incontinence.  Patient denies any recent infections or antibiotic use.  He does endorse multiple prior scratch wounds to the upper extremities.  He denies any known trauma to the back.[CB1.1]  No recent[CB1.2] dental disease[CB1.1]. No recent IV[CB1.2] drug[CB1.1] or medication administration.[CB1.2]    MRI obtained after admission was significant for an infection of the interspinous bursal cyst with reactive epidural thickening and paraspinous cellulitis.  The radiology report noted that there was no drainable fluid.  Patient was started on vancomycin (11/4-) and ertapenem (11/4-) given multiple listed prior allergies to antimicrobials including penicillin and[CB1.1] his[CB1.2] chronically immunosuppressed state.  Patient was evaluated by neurosurgery with recommendations against surgical intervention at this time.[CB1.1] Blood cultures obtained on admission are pending.[CB1.2]        Transplants:  10/6/1993 (Kidney), 4/18/1978 (Kidney), Postoperative day:  9161.  Coordinator Vale Bennett    Review of Systems:   CONSTITUTIONAL:[CB1.1]  Warmth, n[CB1.2]o fevers or chills  EYES: negative for icterus   ENT:  negative for acute hearing loss  RESPIRATORY:  negative for cough  CARDIOVASCULAR:  negative for chest pain  GASTROINTESTINAL:  negative for diarrhea  GENITOURINARY:  negative for dysuria  HEME:[CB1.1]  B[CB1.2]leeding[CB1.1] from excoriations[CB1.2]  INTEGUMENT:[CB1.1]  Stable skin lesions[CB1.2]  NEURO:[CB1.1]  No  tingling, numbness, weakness[CB1.2]    Past Medical History:   Diagnosis Date     AION (acute ischemic optic neuropathy)      Anemia in chronic renal disease      Avascular necrosis of bones of both hips (H)     s/p bilateral hip replacements     Basal cell carcinoma      Chronic hepatitis B (H)      Clostridium difficile colitis      Coronary artery disease involving native coronary artery of native heart without angina pectoris 6/17/2014    Coronary angiogram 6/17/14: Severe distal 3-vessel disease involving small vessels, not amenable to PCI or CABG.     CRP elevated      Depression      Diverticulosis      Dyslipidemia      FSGS (focal segmental glomerulosclerosis)      Gastric ulcer with hemorrhage 2/12/12     GERD (gastroesophageal reflux disease)      Glaucoma     OHTN     Hemorrhoids      Hypertension secondary to other renal disorders      Hypogonadism in male      Immunosuppressed status (H)      Kidney replaced by transplant     focal glomerulosclerosis      NSTEMI (non-ST elevated myocardial infarction) (H) 6/17/2014     JULIANE (obstructive sleep apnea)     Doesn't use CPAP     Paracentral scotoma     LE     Secondary renal hyperparathyroidism (H)      Squamous cell carcinoma        Past Surgical History:   Procedure Laterality Date     APPENDECTOMY       CATARACT IOL, RT/LT  4/19/2000    RE     CATARACT IOL, RT/LT  6/1/2000    LE     COLECTOMY SUBTOTAL  1983    10 cm, diverticulitis     COLONOSCOPY  2/13/2012    Procedure:COLONOSCOPY; Surgeon:SLOAN GALLARDO; Location: GI     ESOPHAGOSCOPY, GASTROSCOPY, DUODENOSCOPY (EGD), COMBINED  2/13/2012    Procedure:COMBINED ESOPHAGOSCOPY, GASTROSCOPY, DUODENOSCOPY (EGD); Surgeon:SLOAN GALLARDO; Location:[facility]     EXTRACAPSULAR CATARACT EXTRATION WITH INTRAOCULAR LENS IMPLANT  4-20-10, 6-1-10    Rt, Lt     HERNIA REPAIR  1995    Lt inguinal     HIP SURGERY      1981, bilat MITZI, revised 2001, 2005     KIDNEY SURGERY  1978 and 1993     transplant     KNEE SURGERY  1983, 1987    bilat TKA     MOHS MICROGRAPHIC PROCEDURE       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 Wife is also a kidney transplant recipient.     Works part-time     Social History   Substance Use Topics     Smoking status: Former Smoker     Packs/day: 1.00     Years: 9.00     Quit date: 1/1/1988     Smokeless tobacco: Former User     Alcohol use 0.0 oz/week     0 Standard drinks or equivalent per week      Comment: rarely 1 drink per month       Immunization History   Administered Date(s) Administered     HEPA 01/26/2009, 01/20/2016     Influenza (IIV3) PF 01/26/2009, 11/10/2010, 01/04/2012, 11/11/2013, 10/15/2015, 11/04/2016, 09/20/2017     Influenza Vaccine IM 3yrs+ 4 Valent IIV4 10/08/2014, 10/23/2018     Pneumo Conj 13-V (2010&after) 10/08/2014     Pneumococcal 23 valent 11/01/2005, 10/23/2018     Tdap (Adacel,Boostrix) 01/26/2009       Patient Active Problem List   Diagnosis     BCC (basal cell carcinoma), trunk     JULIANE (obstructive sleep apnea)     Hypertension secondary to other renal disorders     Coronary artery disease involving native coronary artery of native heart without angina pectoris     NSTEMI (non-ST elevated myocardial infarction) (H)     Depression     Diverticulosis     Hemorrhoids     Kidney replaced by transplant     Basal cell carcinoma     Squamous cell carcinoma     Dyslipidemia     CRP elevated      Glaucoma     AION (acute ischemic optic neuropathy)     Paracentral scotoma     Hip pain     Inflamed seborrheic keratosis     Intertrigo     Chronic hepatitis B (H)     Immunosuppressed status (H)     Hypogonadism in male     GERD (gastroesophageal reflux disease)     Aftercare following organ transplant     Acute midline low back pain without sciatica     Septic bursitis     Impaired mobility     Infection of lumbar spine (H)            Current Medications & Allergies:       amLODIPine  5 mg Oral Daily     aspirin  81 mg Oral Daily     atorvastatin  20 mg Oral Daily     docusate sodium  100 mg Oral BID     enoxaparin  40 mg Subcutaneous Q24H     entecavir  0.5 mg Oral Daily     ertapenem (INVanz) IV  1 g Intravenous Q24H     FLUoxetine  40 mg Oral QAM     isosorbide mononitrate  15 mg Oral Daily     latanoprost  1 drop Both Eyes At Bedtime     mycophenolate  750 mg Oral BID     omeprazole  20 mg Oral QAM     polyethylene glycol  17 g Oral Daily     predniSONE  5 mg Oral Daily     sodium chloride (PF)  3 mL Intracatheter Q8H     vancomycin (VANCOCIN) IV  1,500 mg Intravenous Q12H       Infusions/Drips:      Allergies   Allergen Reactions     Penicillins Shortness Of Breath and Hives     Keflex [Cephalexin Hcl] Other (See Comments)     Pt could not recall reaction     Tetracycline Other (See Comments)     Patient could not recall reaction     Sulfa Drugs Rash            Physical Exam:   Vitals were reviewed.  All vitals stable.  Patient Vitals for the past 24 hrs:   BP Temp Temp src Pulse Resp SpO2 Weight   11/05/18 1200 110/60 100.4  F (38  C) Oral 85 16 94 % -   11/05/18 1118 - - - - - - 77.8 kg (171 lb 8.3 oz)   11/05/18 0708 104/64 98.5  F (36.9  C) Oral - 16 91 % -   11/05/18 0416 108/63 97.3  F (36.3  C) Oral - 16 96 % -   11/04/18 2214 105/59 98.3  F (36.8  C) Oral 60 16 94 % -   11/04/18 1659 124/68 99.4  F (37.4  C) Oral 71 16 95 % 82.5 kg (181 lb 14.1 oz)   11/04/18 1600 121/75 - - - - 92 % -   11/04/18  1545 116/72 - - - - 93 % -   11/04/18 1530 117/70 - - - - 93 % -   11/04/18 1515 108/68 - - - - 90 % -   11/04/18 1500 112/71 - - - - 94 % -   11/04/18 1445 107/71 - - - - - -   11/04/18 1430 102/72 - - - - 99 % -   11/04/18 1415 111/75 - - - - 91 % -   11/04/18 1400 115/72 - - - - - -     Ranges for vital signs:  Temp:  [97.3  F (36.3  C)-100.4  F (38  C)] 100.4  F (38  C)  Pulse:  [60-85] 85  Heart Rate:  [60] 60  Resp:  [16] 16  BP: (102-124)/(59-75) 110/60  SpO2:  [90 %-99 %] 94 %  Vitals:    11/04/18 1046 11/04/18 1659 11/05/18 1118   Weight: 79.4 kg (175 lb) 82.5 kg (181 lb 14.1 oz) 77.8 kg (171 lb 8.3 oz)       Physical Examination:  GENERAL:  well-developed, well-nourished, in bed in no acute distress.  HEAD:  Head is normocephalic, atraumatic   EYES:  Eyes have anicteric sclerae without conjunctival injection   ENT:  Oropharynx is moist without exudates or ulcers. Tongue is midline  NECK:  Supple.   LUNGS:  Clear to auscultation bilateral[CB1.1]ly from the anterior without wheezes, crackles, or rhonchi[CB1.2]  CARDIOVASCULAR:  Regular rate and rhythm with no murmurs, gallops or rubs.  ABDOMEN:  Normal bowel sounds, soft, nontender.[CB1.1]   MSK: No tenderness to palpation of spine or paraspinal regions. No palpable masses. No overlying erythema or obvious abrasions.[CB1.2]  SKIN:[CB1.1]  Diffuse skin lesions throughout on exposed skin on upper and lower extremities. No apparent erythema, fluctuant lesions, or induration.[CB1.2]  Line in place without any surrounding erythema or exudate.  NEUROLOGIC:  Grossly nonfocal.[CB1.1] Equal movement in all four[CB1.2] extremities[CB1.1].[CB1.2]         Laboratory Data:     Absolute CD4   Date Value Ref Range Status   12/18/2008 898 mm3 Final       Inflammatory Markers  Recent Labs   Lab Test  11/05/18   0739  11/04/18   1214  11/07/17   1207  10/08/14   0835  10/07/14   0733  10/06/14   0820   10/03/14   1513   SED  48*  18  27*   --    --    --    --   52*   CRP   160.0*  110.0*  10.3*  71.2*  90.8*  128.0*   < >  148.0*    < > = values in this interval not displayed.       Immune Globulin Studies   No lab results found.    Metabolic Studies     Recent Labs   Lab Test  11/05/18   0739  11/04/18 2020 11/04/18   1214  10/23/18   1116   10/04/14   0734   02/12/12   1850   NA  139   --   140  138   < >  137   < >  138   POTASSIUM  3.6   --   3.3*  3.7   < >  3.7   < >  3.9   CHLORIDE  107   --   107  103   < >  105   < >  106   CO2  24   --   27  28   < >  25   < >  21   ANIONGAP  8   --   6  7   < >  7   < >  11   BUN  11   --   10  9   < >  9   < >  16   CR  0.69  0.75  0.69  0.83   < >  0.84   < >  0.85   GFRESTIMATED  >90  >90  >90  >90   < >  >90  Non  GFR Calc     < >  >90   GLC  80   --   77  82   < >  95   < >  71   HEMA  8.4*   --   8.3*  9.2   < >  8.7   < >  9.3   PHOS  2.9   --    --    --    --    --    < >   --    MAG  1.7   --    --    --    < >   --    < >   --    LACT   --    --   1.1   --    --    --    --   1.1   PCAL   --    --    --    --    --   0.06   --    --    CKT   --    --    --   86   --    --    --    --     < > = values in this interval not displayed.       Hepatic Studies  Recent Labs   Lab Test  10/23/18   1116  03/13/18   0934  09/12/17   0923   06/10/14   1044   BILITOTAL  0.8  0.5  0.9   < >  1.2   BILIDELTA   --    --    --    --   0.1   BILICONJ   --    --    --    --   0.0   DBIL  0.2  0.1  0.2   < >   --    ALKPHOS  87  77  85   < >  81   PROTTOTAL  7.4  6.8  7.2   < >  7.2   ALBUMIN  3.5  3.2*  3.3*   < >  3.9   AST  20  24  20   < >  38   ALT  22  27  24   < >  36    < > = values in this interval not displayed.       Pancreatitis testing  Recent Labs   Lab Test  10/23/18   1116   02/12/12   1850   LIPASE   --    --   94   TRIG  97   < >   --     < > = values in this interval not displayed.       Gout Labs    No lab results found.    Hematology Studies    Recent Labs   Lab Test  11/05/18   0739   11/04/18   1214   10/23/18   1116  06/21/18   1154  03/13/18   0934  09/12/17   0923   WBC  9.6   --   9.6  7.8  8.1  8.5  7.3   ANEU  6.9   --   6.2   --    --    --    --    ALYM  1.5   --   1.7   --    --    --    --    ALISIA  1.1   --   1.7*   --    --    --    --    AEOS  0.1   --   0.1   --    --    --    --    HGB  11.7*   --   12.5*  14.0  12.9*  12.8*  12.5*   HCT  38.4*   --   40.4  45.0  40.9  41.8  38.2*   PLT  307   < >  307  352  332  343  338    < > = values in this interval not displayed.       Clotting Studies    Recent Labs   Lab Test  03/13/18   0934  09/12/17   0923  03/10/17   1014  08/30/16   1042   10/03/14   1513   INR  0.95  0.86  0.97  0.99   < >  0.99   PTT   --    --    --    --    --   23    < > = values in this interval not displayed.       Iron Testing  Recent Labs   Lab Test  11/05/18   0739   MCV  92       Markers  Alpha Fetoprotein   Date Value Ref Range Status   07/08/2015 3.8 0 - 8 ug/L Final       Thyroid Studies     Recent Labs   Lab Test  11/07/17   1207  05/06/15   1505  02/08/11   1022   TSH  1.73  0.56  3.54       Urine Studies   Recent Labs   Lab Test  11/04/18   2128  01/05/11   0916   URINEPH  6.5  6.0   NITRITE  Negative  Negative   LEUKEST  Negative  Negative   WBCU  1  0       Medication levels  Recent Labs   Lab Test  06/21/18   1154  09/12/17   0923  10/05/14   0403   VANCOMYCIN   --    --   14.6   CYCLSP  <25*   --    --    MPACID   --   3.82*   --    MPAG   --   69.3   --        Last Culture results with specimen source  Culture Micro   Date Value Ref Range Status   11/04/2018 No growth after 16 hours  Preliminary   11/04/2018 No growth after 16 hours  Preliminary   05/24/2015   Final    No Salmonella, Shigella, Campylobacter, E. coli O157, Aeromonas, or Plesiomonas   isolated.     10/06/2014 No growth  Final   10/06/2014 No growth  Final   10/04/2014 No growth  Final   10/04/2014 No growth  Final   10/03/2014 No growth  Final   10/03/2014   Final    Cancelled by lab - Specimen never  received. Notified Naomi Singh RN (5B) @ 723.   cg 10/05/14     10/03/2014 (A)  Final    Cultured on the 1st day of incubation: Corynebacterium species  Critical Value/Significant Value, preliminary result only, called to and read   back by Chang King RN on 5B@1832 on [id number]      07/08/2005 No growth  Final   07/08/2005 No anaerobes isolated  Final   06/01/2005 No growth  Final   06/01/2005 No anaerobes isolated  Final    Specimen Description   Date Value Ref Range Status   11/04/2018 Blood Left Arm  Final   11/04/2018 Blood Right Hand  Final   04/10/2018 Nasal  Final   02/15/2018 Nasal  Final     Comment:     Swab   05/24/2015 Feces  Final   05/24/2015 Feces  Final   10/07/2014 Urine  Final   10/06/2014 Blood Right Hand  Final   10/06/2014 Blood Right Hand  Final   10/04/2014 Blood Right Hand  Final   10/04/2014 Blood Right Arm  Final   10/03/2014 Blood Right Arm  Final   10/03/2014 Blood  Final   10/03/2014 Blood Right Hand  Final   02/13/2012 Plasma  Final   07/08/2005 Other LEFT FEMORAL CANAL  Final   07/08/2005 Other LEFT FEMORAL CANAL  Final   07/08/2005 Other LEFT FEMORAL CANAL  Final   06/01/2005 Left Hip  Final   06/01/2005 Left Hip  Final   06/01/2005 Left Hip  Final        Last check of C difficile  C Diff Toxin B PCR   Date Value Ref Range Status   05/24/2015 (A) NEG Final    Formed stools are not acceptable, by national guidelines, for the detection of   Clostridium difficile toxin and culture.   Canceled, Test credited  SPOKE WITH ESMER JACKSON.       Quantiferon testing   Recent Labs   Lab Test  08/30/16   1043   TBRSLT  Negative   TBAGN  0.00       Virology:  CMV viral loads    Recent Labs   Lab Test  09/12/17   0923  02/14/12   0556   CSPEC  Plasma, EDTA anticoagulant  Whole blood, EDTA anticoagulant   CMVLOG  Not Calculated   --        Log IU/mL of CMVQNT   Date Value Ref Range Status   09/12/2017 Not Calculated <2.1 [Log_IU]/mL Final       EBV DNA Copies/mL   Date Value Ref Range Status    08/02/2017 EBV DNA Not Detected EBVNEG [Copies]/mL Final       Hepatitis B Testing   Recent Labs   Lab Test  06/10/14   1044  09/10/13   1025  05/23/13   0855  01/05/11   0909   HBSAB   --    --    --   0.0   HBCAB   --    --    --   Positive*   HEPBANG   --   Positive*   --   Positive*   HBEABY   --   Positive Reference range: Negative (Note) The anti-HBe is repeatedly reactive, which is consistent with recent or remote Hepatitis B infection. Anti-HBe can be present in a small proportion of chronic HBV infections, but its presence usually indicates resolution. If HBeAg is also positive, this may represent the transition between the appearance of anti-HBe and decline of HBeAg, and retesting in one month may be useful. Performed by [facility], [address] [facility] [address] [phone] [email or web address], Villa Chandra MD, Lab. Director*   --   Positive   HBEAGN   --   Negative Reference range: Negative (Note) Performed by [facility], [address] [facility] [place] [address] [phone] [email or web address], Villa Chandra MD, Lab. Director   --   Negative   HBQTT  Not Quantified  Unit: IU/mL    32  470,000 Unit: IU/mL  14,000      Hepatitis C Antibody   Date Value Ref Range Status   12/18/2008 Negative NEG Final       CMV IgG Antibody   Date Value Ref Range Status   02/14/2012 <0.20  Negative for anti-CMV IgG U/mL Final     CMV IgM Antibody   Date Value Ref Range Status   02/14/2012 <8.00  No detectable antibody. AU/mL Final     Imaging:  Recent Results (from the past 48 hour(s))   MR Lumbar Spine w/o & w Contrast    Narrative    MR LUMBAR SPINE W/O & W CONTRAST 11/4/2018 1:46 PM    Provided History: lumbar back pain radiating to left hip, fevers,  immune suppressed, rule out osteomyelitis versus abscess; .    Comparison: Abdominal MRI 7/13/2015    Technique: Sagittal T1-weighted and T2-weighted and axial T2-weighted  images of the lumbar spine were obtained without intravenous contrast.  Post intravenous  contrast using gadolinium axial and sagittal  T1-weighted images were obtained with fat saturation.    Contrast: 7.9CC GADAVIST     Findings:   5 lumbar-type vertebrae counting down from the presumed 12th ribs. The  tip the conus medullaris is at L1-2. Cauda equina nerve roots and  visualized thoracic cord are normal.    Asymmetric left facet joint effusions and interspinous edema with 2 mm  dorsal epidural bursal cyst formation. There is abnormal enhancing  edema within the paraspinous musculature bilaterally at L2-pelvis. No  discrete drainable fluid collection. Abnormal epidural  thickening/enhancement L4-S1. No abnormal intrathecal enhancement.    Normal alignment of the lumbar vertebrae. Normal lumbar lordosis. Disc  degeneration at L1-2 with loss of disc height and intradiscal T2  signal. Severe asymmetric right psoas muscular atrophy. Multiple cm  nonenhancing well-circumscribed heterogeneous hemorrhagic signal focus  in the right perivertebral space in the expected location of the right  psoas muscle at the level of L5 measuring up to 2.7 cm in maximal  dimension, unchanged dating back to at least 7/13/2015, probable  chronic organized hematoma.     Findings on a level by level basis are as follows:    T12-L1: No spinal canal or neural foraminal stenosis.    L1-2: Disc bulge. Mild spinal canal stenosis. No significant neural  foraminal stenosis.    L2-3: No spinal canal or neural foraminal stenosis.    L3-4: Disc bulge. Mild bilateral neural foraminal stenosis. No  significant spinal canal stenosis.    L4-5: Disc bulge and facet arthrosis. Moderate bilateral neural  foraminal stenosis. No significant spinal canal stenosis.    L5-S1: Facet arthrosis. No spinal canal or neural foraminal stenosis.    Atrophic kidneys.      Impression    Impression:  1. Findings suspicious for infected L4-5 interspinous bursal cyst with  adjacent reactive epidural thickening and paraspinous cellulitis. No  drainable fluid  collection.  2. Multilevel lumbar spondylosis. Moderate bilateral neural foraminal  stenosis at L4-5. Mild spinal canal stenosis at L1-2.  3. Multiple well-circumscribed hemorrhagic foci in the right  perivertebral soft tissues at L5 in the location of a severely  atrophied right psoas muscle, unchanged dating back to at least  7/13/2015, suspected chronic organized hematoma.    [Result: Infected interspinous bursal cyst]    Finding was identified on 11/4/2018 1:37 PM.     Dr. Han was contacted by Dr. Lopez at 11/4/2018 1:56 PM and  verbalized understanding of the urgent finding.     ARVIN LOPEZ MD[CB1.1]          Revision History        User Key Date/Time User Provider Type Action    > CB1.2 11/5/2018  4:28 PM Sheila Chavira MD Resident Sign     CB1.1 11/5/2018  1:33 PM Sheila Chavira MD Resident             Consults by Bhanu Woods MD at 11/5/2018 11:14 AM     Author:  Bhanu Woods MD Service:  Nephrology Author Type:  Fellow    Filed:  11/5/2018 11:43 AM Date of Service:  11/5/2018 11:14 AM Creation Time:  11/5/2018 11:14 AM    Status:  Attested :  Bhanu Woods MD (Fellow)    Cosigner:  Enrique Enriquez MD at 11/5/2018  1:18 PM         Consult Orders:    1. Nephrology Kidney/Pancreas Transplant Adult IP Consult: Patient to be seen: Routine within 24 hrs; Call back #: [phone]; Immunosuppression recommendations in setting of infection; Consultant may enter orders: Yes [[id number]] ordered by Adi Shin MD at 11/05/18 0917           Attestation signed by Enrique Enriquez MD at 11/5/2018  1:18 PM        Attestation:  This patient has been seen and evaluated by Enrique higginbotham MD.  I have reviewed the note and agree with plan of care as documented by the fellow.                                      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 Transplant nephrology consulted for IS management since there is a suspicion for infection, septic bursitis.    SP DDKT 10/1993  bl creatinine 0.7-1.1-- stable  Immunosuppression with MMF 750mg BiD and prednisone 5  CMV/EBV titers -ve last checked 2017  -- will continue on the same IS dose for now since he he is on minimal IS   -- follow on ID recs for ntibiotics since the diagnosis is based on imaging with a lot of degenerative changes    Lumbar ? 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  Hx of hypogonadism does put him at risk of osteoporosis as well  On calcium and vit D supplements    Hx of restrictive lung disease and cough  On Isordil for pulm HTN  At baseline  Management per primary    Chronic hep B on Entecavir  Hep B load undetectable    Recommendations were communicated to primary team     Seen and discussed with Dr Zoe Woods  Nephrology fellow   [phone]    REASON FOR CONSULT: Management of immunosuppression, Hx of kidney Tx    HISTORY OF PRESENT ILLNESS:  Jerad Ross is a 58 year old male with a 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 Transplant nephrology consulted for IS management since there is a suspicion for infection, septic bursitis.  He reports that he had been doing well with minimal back pain off and on and had a low grade temp to 99 on and off. But he had significant worsening in pain , sudden onset and was limiting his mobility , no response to ibuprofen with a Tmax of 100.8. He was unable to walk and presented to ED.  He reports an episode of shingles on his rt side abdomen wall in aug 2018.  He has a creatinine ~0.7-1.1 at baseline and he is at his baseline renal functions. He was seen by neurosurgery after MRI showed L4-L5 bursal cyst with possible epidural thickening and Paraspinous fluid collection but non drain able. No Bx is planned and he is on vanco and Ertapenem for empirical treatment pending ID recs.    PAST MEDICAL HISTORY:  Reviewed with patient on 11/05/2018     Past Medical History:   Diagnosis Date     AION (acute ischemic optic neuropathy)      Anemia in chronic renal disease      Avascular necrosis of bones of both hips (H)     s/p bilateral hip replacements     Basal cell carcinoma      Chronic hepatitis B (H)      Clostridium difficile colitis      Coronary artery disease involving native coronary artery of native heart without angina pectoris 6/17/2014    Coronary angiogram 6/17/14: Severe distal 3-vessel disease involving small vessels, not amenable to PCI or CABG.     CRP elevated      Depression      Diverticulosis      Dyslipidemia      FSGS (focal segmental glomerulosclerosis)      Gastric ulcer with hemorrhage 2/12/12     GERD (gastroesophageal reflux disease)      Glaucoma     OHTN     Hemorrhoids      Hypertension secondary to other renal disorders      Hypogonadism in male      Immunosuppressed status (H)      Kidney replaced by transplant     focal glomerulosclerosis      NSTEMI (non-ST elevated myocardial infarction) (H) 6/17/2014     JULIANE (obstructive sleep apnea)     Doesn't use CPAP      Paracentral scotoma     LE     Secondary renal hyperparathyroidism (H)      Squamous cell carcinoma        Past Surgical History:   Procedure Laterality Date     APPENDECTOMY       CATARACT IOL, RT/LT  4/19/2000    RE     CATARACT IOL, RT/LT  6/1/2000    LE     COLECTOMY SUBTOTAL  1983    10 cm, diverticulitis     COLONOSCOPY  2/13/2012    Procedure:COLONOSCOPY; Surgeon:SLOAN GALLARDO; Location: GI     ESOPHAGOSCOPY, GASTROSCOPY, DUODENOSCOPY (EGD), COMBINED  2/13/2012    Procedure:COMBINED ESOPHAGOSCOPY, GASTROSCOPY, DUODENOSCOPY (EGD); Surgeon:SLOAN GALLARDO; Location:[facility]     EXTRACAPSULAR CATARACT EXTRATION WITH INTRAOCULAR LENS IMPLANT  4-20-10, 6-1-10    Rt, Lt     HERNIA REPAIR  1995    Lt inguinal     HIP SURGERY      1981, bilat MITZI, revised 2001, 2005     KIDNEY SURGERY  1978 and 1993    transplant     KNEE SURGERY  1983, 1987    bilat TKA     MOHS MICROGRAPHIC PROCEDURE       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, auxiliary spleen     TONSILLECTOMY          MEDICATIONS:  PTA Meds  Prior to Admission medications    Medication Sig Last Dose Taking? Auth Provider   acetaminophen (TYLENOL) 325 MG tablet Take 1-2 tablets by mouth every 6 hours as needed. 11/3/2018 at PM Yes Reported, Patient   amLODIPine (NORVASC) 5 MG tablet Take 1 tablet (5 mg) by mouth daily 11/3/2018 at Unknown time Yes Balta Orantes MD   aspirin 81 MG tablet Take 81 mg by mouth daily  11/3/2018 at Unknown time Yes Reported, Patient   atorvastatin (LIPITOR) 20 MG tablet Take 1 tablet (20 mg) by mouth daily 11/3/2018 at AM Yes Balta Orantes MD   calcium citrate-vitamin D (CITRACAL) 315-200 MG-UNIT TABS Take 1 tablet by mouth daily. 11/3/2018 at Unknown time Yes Reported, Patient   CHOLECALCIFEROL PO Take 1 tablet by mouth daily Dose unknown 11/3/2018 at Unknown time Yes Unknown, Entered By History   clopidogrel (PLAVIX) 75 MG tablet Take 1 tablet (75 mg) by mouth daily 11/3/2018 at Unknown time  Yes Balta Orantes MD   entecavir (BARACLUDE) 0.5 MG tablet Take 1 tablet (0.5 mg) by mouth daily 11/3/2018 at Unknown time Yes Lina Claros MD   FLUoxetine (PROZAC) 40 MG capsule Take 1 capsule (40 mg) by mouth every morning 11/3/2018 at Unknown time Yes Genia Fraser APRN CNP   isosorbide mononitrate (IMDUR) 30 MG 24 hr tablet Take 0.5 tablets (15 mg) by mouth daily 11/3/2018 at Unknown time Yes Balta Orantes MD   Multiple Vitamins-Iron (ONE DAILY MULTIVITAMIN/IRON) TABS Take 1 tablet by mouth daily 11/3/2018 at Unknown time Yes Unknown, Entered By History   mycophenolate (GENERIC EQUIVALENT) 250 MG capsule Take 3 capsules (750 mg) by mouth 2 times daily 11/3/2018 at Unknown time Yes Nolan Lovett MD   Omega-3 Fatty Acids (OMEGA 3 PO) Take 1 capsule by mouth daily Dose unknown 11/3/2018 at Unknown time Yes Reported, Patient   omeprazole (PRILOSEC OTC) 20 MG tablet Take  by mouth daily. 11/3/2018 at Unknown time Yes Aston Perry MD   predniSONE (DELTASONE) 5 MG tablet Take 1 tablet (5 mg) by mouth daily 11/3/2018 at Unknown time Yes Nolan Lovett MD   albuterol (PROAIR HFA) 108 (90 Base) MCG/ACT Inhaler Inhale 2 puffs into the lungs every 6 hours as needed for shortness of breath / dyspnea or wheezing   Aston Perry MD   BETA BLOCKER NOT PRESCRIBED, INTENTIONAL, Beta Blocker not prescribed intentionally due to Bradycardia < 50 bpm without beta blocker therapy   Carolyn Espinoza PA-C   BUDESONIDE, INHALATION, 90 MCG/ACT AEPB Inhale 2 puffs into the lungs 2 times daily NOT STARTED  Sisi Lockwood MD   fluticasone (FLONASE) 50 MCG/ACT spray Spray 1-2 sprays into both nostrils daily More than a month at Unknown time  Aston Perry MD   fluticasone-salmeterol (ADVAIR) 250-50 MCG/DOSE diskus inhaler Inhale 1 puff into the lungs every 12 hours   Wilbert Buck MD   latanoprost (XALATAN) 0.005 % ophthalmic  solution Place 1 drop into both eyes At Bedtime 11/2/2018 at PM  Viki Rizzo MD      Current Meds    amLODIPine  5 mg Oral Daily     aspirin  81 mg Oral Daily     atorvastatin  20 mg Oral Daily     docusate sodium  100 mg Oral BID     enoxaparin  40 mg Subcutaneous Q24H     entecavir  0.5 mg Oral Daily     ertapenem (INVanz) IV  1 g Intravenous Q24H     FLUoxetine  40 mg Oral QAM     isosorbide mononitrate  15 mg Oral Daily     latanoprost  1 drop Both Eyes At Bedtime     mycophenolate  750 mg Oral BID     omeprazole  20 mg Oral QAM     predniSONE  5 mg Oral Daily     sodium chloride (PF)  3 mL Intracatheter Q8H     vancomycin (VANCOCIN) IV  1,500 mg Intravenous Q12H     Infusion Meds      ALLERGIES:    Allergies   Allergen Reactions     Penicillins Shortness Of Breath and Hives     Keflex [Cephalexin Hcl] Other (See Comments)     Pt could not recall reaction     Tetracycline Other (See Comments)     Patient could not recall reaction     Sulfa Drugs Rash       REVIEW OF SYSTEMS:  A 10 point review of systems was negative except as noted above.    SOCIAL HISTORY:   Social History     Social History     Marital status:      Spouse name: N/A     Number of children: 0     Years of education: N/A     Occupational History      Disabled      [employer]     Social History Main Topics     Smoking status: Former Smoker     Packs/day: 1.00     Years: 9.00     Quit date: 1/1/1988     Smokeless tobacco: Former User     Alcohol use 0.0 oz/week     0 Standard drinks or equivalent per week      Comment: rarely 1 drink per month     Drug use: No     Sexual activity: Not on file     Other Topics Concern     Special Diet No     Exercise Yes     walks     Social History Narrative    . Wife is also a kidney transplant recipient.     Works part-time     Reviewed with patient   Wife accompanies Jerad Mayo Vandana in hospital room    FAMILY MEDICAL HISTORY:   Family History   Problem Relation Age of  Onset     Cardiovascular Father      AI with valve repair     Hypertension Father      KIDNEY DISEASE Father      Cancer Paternal Grandmother      ovarian ca     Cerebrovascular Disease Paternal Grandfather      Cerebrovascular Disease Maternal Grandfather      KIDNEY DISEASE Paternal Aunt      Skin Cancer No family hx of      Glaucoma No family hx of      Macular Degeneration No family hx of      Amblyopia No family hx of      Reviewed with patient     PHYSICAL EXAM:   Temp  Av.6  F (37  C)  Min: 97.3  F (36.3  C)  Max: 99.6  F (37.6  C)      Pulse  Av.7  Min: 60  Max: 71 Resp  Av.7  Min: 16  Max: 20  SpO2  Av %  Min: 85 %  Max: 99 %       /64 (BP Location: Left arm)  Pulse 60  Temp 98.5  F (36.9  C) (Oral)  Resp 16  Wt 82.5 kg (181 lb 14.1 oz)  SpO2 91%  BMI 27.65 kg/m2       Admit Weight: 79.4 kg (175 lb)     GENERAL APPEARANCE: alert and no distress  Head NC/AT  EYES: - scleral icterus, pupils equal  HENT: mouth  without ulcers or lesions  NECK: supple, no goiter  Lymphatics: no cervical or supraclavicular LAD  Pulmonary: lungs clear to auscultation with equal breath sounds bilaterally, no clubbing or cyanosis  CV: regular rhythm, - rate, no rub   - JVP -   - Edema-  GI: soft, nontender, normal bowel sounds, no HSM   MS: no evidence of inflammation in joints, no muscle tenderness  : no [place]  SKIN: no rash, warm, dry  NEURO: mentation intact and speech normal    LABS:   CMP  Recent Labs  Lab 18  0739 18  1214     --  140   POTASSIUM 3.6  --  3.3*   CHLORIDE 107  --  107   CO2 24  --  27   ANIONGAP 8  --  6   GLC 80  --  77   BUN 11  --  10   CR 0.69 0.75 0.69   GFRESTIMATED >90 >90 >90   GFRESTBLACK >90 >90 >90   HEMA 8.4*  --  8.3*   MAG 1.7  --   --    PHOS 2.9  --   --      CBC  Recent Labs  Lab 18  0739 18  1214   HGB 11.7*  --  12.5*   WBC 9.6  --  9.6   RBC 4.18*  --  4.39*   HCT 38.4*  --  40.4   MCV 92  --  92    MCH 28.0  --  28.5   MCHC 30.5*  --  30.9*   RDW 15.4*  --  15.3*    280 307     INRNo lab results found in last 7 days.  ABGNo lab results found in last 7 days.   URINE STUDIES  Recent Labs   Lab Test  11/04/18   2128  01/05/11   0916   COLOR  Yellow  Yellow   APPEARANCE  Clear  Clear   URINEGLC  Negative  Negative   URINEBILI  Negative  Negative   URINEKETONE  10*  Negative   SG  1.020  1.012   UBLD  Negative  Negative   URINEPH  6.5  6.0   PROTEIN  10*  Negative   NITRITE  Negative  Negative   LEUKEST  Negative  Negative   RBCU  1  0   WBCU  1  0     Recent Labs   Lab Test  10/23/18   1122  06/21/18   1209  06/10/14   1029  05/23/13   0842   [facility]  0.21*  0.12  <0.05  <0.05     PTH  No lab results found.  IRON STUDIES  No lab results found.    IMAGING:  All imaging studies reviewed by me.     Bhanu Woods MD[MA1.1]       Revision History        User Key Date/Time User Provider Type Action    > MA1.1 11/5/2018 11:43 AM Bhanu Woods MD Fellow Sign                     Progress Notes - Physician (Notes for yesterday and today)      Progress Notes by Krishna Frye MD at 11/9/2018  7:01 PM     Author:  Krishna Frye MD Service:  Infectious Disease Author Type:  Physician    Filed:  11/10/2018 12:56 AM Date of Service:  11/9/2018  7:01 PM Creation Time:  11/9/2018  7:01 PM    Status:  Signed :  Krishna Frye MD (Physician)         [facility]  Transplant Infectious Disease Progress Note[WC1.1] -- Sign Off[WC1.2]     Patient:  Jerad Ross  YOB: 1960  Medical record number: [id number]         Assessment and Recommendations:   Recommendations:  - Continue the empiric IV ceftriaxone 2 g q 24 hours through 12/4/18 to complete a defined four week course of treatment[WC1.1] via PICC line[WC1.2].  (Remove his PICC with discontinuation of the ceftriaxone after the 12/4/18 dose.)  - Check repeat CRP / ESR, CBC with differential / platelets, and  comprehensive monitoring panel in about two weeks.  - Schedule him for a follow-up appointment with his primary physician[WC1.1] in Primary Care Clinic[WC1.2] in about three[WC1.1]+[WC1.2] weeks[WC1.1],[WC1.2] around the end of the antibiotic course (with a repeat CRP / ESR check recommended at the end of the course).[WC1.1]  - Agree with plan to discharge tomorrow.[WC1.2]  - Expecting him to continue to do well, post-discharge follow-up in the Transplant Infectious Disease Clinic should not be needed.[WC1.1]    Case was discussed with the [facility]'s Medicine team.[WC1.2]  With the above recommendations, Transplant I[WC1.1]D[WC1.2] will sign off now.  Thanks for allowing us to participate in the care of this gentleman.  Please page if additional ID questions or concerns arise.    Krishna Frye MD  Pager [phone]    Assessment:[WC1.1]  -[WC1.2] Infected intraspinous bursal cyst with paraspinous cellulitis[WC1.1]:  A[WC1.2] spine[WC1.1] MRI showed[WC1.2] evidence of infection in[WC1.1] the[WC1.2] interspinous bursal cyst with reactive epidural thickening and paraspinous cellulitis[WC1.1],[WC1.2] without[WC1.1] any[WC1.2] obvious drainable fluid.[WC1.1] He has had no[WC1.2] leukocytosis (WBC 9.6)[WC1.1] or elevated[WC1.2] lactate 1.1,[WC1.1] but a serum[WC1.2] CRP[WC1.1] peaked[WC1.2] at 1[WC1.1]6[WC1.2]0[WC1.1].0 on 11/5/18.  He was initially started on empiric IV vancomycin plus ertapenem, but switched to more focused empiric IV ceftriaxone on 11/6/18, with a subsequent steady decrease in his serum CRP down to 32.0 on 11/9/18 and with improvement in his back pain.  His c[WC1.2]urrent infection does not appear to penetrate[WC1.1] his[WC1.2] CNS, however,[WC1.1] a[WC1.2] short[WC1.1] burst[WC1.2] of[WC1.1] CNS dosing of[WC1.2] ceftriaxone[WC1.1] has been given at the start of his course, with plan to discharge on a standard dose of 2 grams IV daily to complete a four week course through 12/4/18.   A c[WC1.2]yst  aspiration[WC1.1] was[WC1.2] performed by[WC1.1] [facility][WC1.2] on 11/8[WC1.1]/18 (unfortunately belatedly after he had been on antibiotics for four days already) with a negative Gram stain and cultures lacking growth to date.  Nevertheless, despite the lack of a specific microbiological pathogen diagnosis, overall he seems to be steadily improving on the empiric ceftriaxone.    Other[WC1.2] ID issues:  - QTc interval: No recent EKG available   - Bacterial prophylaxis: On ceftriaxone as above.  - Pneumocystis prophylaxis: None   - Fungal prophylaxis: None   - Isolation status: Routine.    Interval History:[WC1.1]  Mr. Ross remains consistently afebrile (T max 98.7 degrees F) since 11/5/18 midnight without chills or sweats and with a stable, normal peripheral WBC of 7.2 this AM.  His serum CRP has decreased nicely from 160.0 on 11/5/18 to 32.0 today on ongoing IV ceftriaxone[WC1.2].[WC1.1] A transcutaneous aspiration of his bursal cyst was[WC1.2] performed[WC1.1] yesterday,[WC1.2] 11/8[WC1.1]/18, without complication and his back continues to feel more comfortable and less painful today -- it is much improved versus admission[WC1.2].[WC1.1]  He has some[WC1.2] mild soreness at[WC1.1] the[WC1.2] aspiration site.[WC1.1] He o[WC1.2]therwise[WC1.1] feels well and is regaining his strength, although is planning to discharge to a TCU (for reconditioning) tomorrow AM after a PICC is placed this evening (or in the AM).  he lacks cough, dyspnea, chest pain, EENT symptoms, nausea, abdominal discomfort, diarrhea, headache of other[WC1.2] new[WC1.1] complaint[WC1.2]s[WC1.1] today[WC1.2].[WC1.1] Gram stain and cultures from the cyst aspiration yesterday are so far negative.[WC1.2]  Blood cultures[WC1.1] from 11/4/18[WC1.2] remain negative on day[WC1.1] 5[WC1.2].          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  Patient reports feeling hot and taking his temperature at home with low-grade temperatures of approximately 100.6.  Patient says that intense pain precipitated nausea and emesis.  He denies any fevers or chills.  He further denies any tingling, numbness, urinary or fecal incontinence.  Patient denies any recent infections or antibiotic use.  He does endorse multiple prior scratch wounds to the upper extremities.  He denies any known trauma to the back.  No recent dental disease. No recent IV drug or medication administration.     MRI obtained after admission was significant for an infection of the interspinous bursal cyst with reactive epidural thickening and paraspinous cellulitis.  The radiology report noted that there was no drainable fluid.  Patient was started on vancomycin (-) and ertapenem () given multiple listed prior allergies to antimicrobials including penicillin and his chronically immunosuppressed state.  Patient was evaluated by neurosurgery with recommendations against surgical intervention at this time. Blood cultures obtained on admission are pending.    Transplants:  10/6/1993 (Kidney), 1978 (Kidney), Postoperative day:  9165.  Coordinator Vale Bennett    Review of Systems:  CONSTITUTIONAL:  No fevers or chills   EYES: negative for icterus or acute vision changes   ENT:  negative for acute hearing loss, tinnitus, sore  throat   RESPIRATORY:  negative for cough, sputum or dyspnea   CARDIOVASCULAR:  negative for chest pain, palpitations   GASTROINTESTINAL:  negative for nausea, vomiting, diarrhea or constipation   GENITOURINARY:  negative for dysuria or hematuria   HEME:  No easy bruising or bleeding   INTEGUMENT:  Baseline pruritus with occasional healing erythematous patch and abrasion scattered throughout  NEURO:  Negative for headache or tremor     Past Medical History:   Diagnosis Date     AION (acute ischemic optic neuropathy)      Anemia in chronic renal disease      Avascular necrosis of bones of both hips (H)     s/p bilateral hip replacements     Basal cell carcinoma      Chronic hepatitis B (H)      Clostridium difficile colitis      Coronary artery disease involving native coronary artery of native heart without angina pectoris 6/17/2014    Coronary angiogram 6/17/14: Severe distal 3-vessel disease involving small vessels, not amenable to PCI or CABG.     CRP elevated      Depression      Diverticulosis      Dyslipidemia      FSGS (focal segmental glomerulosclerosis)      Gastric ulcer with hemorrhage 2/12/12     GERD (gastroesophageal reflux disease)      Glaucoma     OHTN     Hemorrhoids      Hypertension secondary to other renal disorders      Hypogonadism in male      Immunosuppressed status (H)      Kidney replaced by transplant     focal glomerulosclerosis      NSTEMI (non-ST elevated myocardial infarction) (H) 6/17/2014     JULIANE (obstructive sleep apnea)     Doesn't use CPAP     Paracentral scotoma     LE     Secondary renal hyperparathyroidism (H)      Squamous cell carcinoma      Past Surgical History:   Procedure Laterality Date     APPENDECTOMY       CATARACT IOL, RT/LT  4/19/2000    RE     CATARACT IOL, RT/LT  6/1/2000    LE     COLECTOMY SUBTOTAL  1983    10 cm, diverticulitis     COLONOSCOPY  2/13/2012    Procedure:COLONOSCOPY; Surgeon:SLOAN GALLARDO; Location: GI     ESOPHAGOSCOPY,  GASTROSCOPY, DUODENOSCOPY (EGD), COMBINED  2/13/2012    Procedure:COMBINED ESOPHAGOSCOPY, GASTROSCOPY, DUODENOSCOPY (EGD); Surgeon:SLOAN GALLARDO; Location:[facility]     EXTRACAPSULAR CATARACT EXTRATION WITH INTRAOCULAR LENS IMPLANT  4-20-10, 6-1-10    Rt, Lt     HERNIA REPAIR  1995    Lt inguinal     HIP SURGERY      1981, bilat MITZI, revised 2001, 2005     KIDNEY SURGERY  1978 and 1993    transplant     KNEE SURGERY  1983, 1987    bilat TKA     MOHS MICROGRAPHIC PROCEDURE       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 Wife is also a kidney transplant recipient.     Works part-time     Social History   Substance Use Topics     Smoking status: Former Smoker     Packs/day: 1.00     Years: 9.00     Quit date: 1/1/1988     Smokeless tobacco: Former User     Alcohol use 0.0 oz/week     0 Standard drinks or equivalent per week      Comment: rarely 1 drink per month          Current Medications & Allergies:       amLODIPine  5 mg Oral Daily     aspirin  81 mg Oral Daily     atorvastatin  20 mg Oral Daily     cefTRIAXone  2 g Intravenous Q24H     docusate sodium  100 mg Oral BID     entecavir  0.5 mg Oral Daily     FLUoxetine  40 mg Oral QAM     isosorbide mononitrate  15 mg Oral Daily     latanoprost  1 drop Both Eyes At Bedtime     mycophenolate  750 mg Oral BID     omeprazole  20 mg Oral QAM     polyethylene glycol  17 g Oral Daily     predniSONE  5 mg Oral Daily     sodium chloride (PF)  3 mL Intracatheter Q8H     Allergies   Allergen Reactions     Penicillins Shortness Of Breath and Hives     Keflex [Cephalexin Hcl] Other (See Comments)     Pt could not recall reaction     Tetracycline Other (See Comments)     Patient could not recall reaction     Sulfa Drugs Rash          Physical Exam:   Vitals were reviewed.  All vitals stable.  Patient Vitals for the past 24 hrs:   BP Temp Temp src Pulse Resp SpO2 Weight   11/09/18  1523 127/73 96.5  F (35.8  C) Oral 59 14 97 % -   11/09/18 1000 - - - - - - 82.5 kg (181 lb 12.8 oz)   11/09/18 0741 137/84 96.2  F (35.7  C) Oral - 16 95 % -   11/09/18 0414 125/74 97.6  F (36.4  C) Oral 51 16 96 % -   11/08/18 2241 134/77 96.4  F (35.8  C) Oral 56 16 94 % -     Ranges for vital signs:  Temp:  [96.2  F (35.7  C)-97.6  F (36.4  C)] 96.5  F (35.8  C)  Pulse:  [51-59] 59  Heart Rate:  [56-62] 62  Resp:  [14-16] 14  BP: (125-137)/(73-84) 127/73  SpO2:  [94 %-97 %] 97 %  Vitals:    11/06/18 0955 11/08/18 1322 11/09/18 1000   Weight: 78.7 kg (173 lb 8 oz) 77.9 kg (171 lb 11.2 oz) 82.5 kg (181 lb 12.8 oz)[WC1.1]     Intake/Output Summary (Last 24 hours) at 11/09/18 190[WC1.3]1[WC1.1]  Last data filed at 11/09/18 1500   Gross per 24 hour   Intake              890 ml   Output              500 ml   Net              390 ml[WC1.3]     Physical Examination:  GENERAL:[WC1.1]  A pleasant, conversant, WDWN 59 yo man sitting up in a chair in NAD[WC1.2].   HEAD:[WC1.1]  NCAT.[WC1.2]  EYES:  E[WC1.1]APPLE,[WC1.2] anicteric sclerae[WC1.1].[WC1.2]  ENT:  No otorrhea.[WC1.1]  No anterior oral lesion.[WC1.2]  NECK:  Supple.  LUNGS:  Clear to auscultation bilaterally[WC1.1].[WC1.2]  CARDIOVASCULAR:  R[WC1.1]RR,[WC1.2] no murmur[WC1.1].[WC1.2]  ABDOMEN:  Normal bowel sounds, soft, nontender[WC1.1].[WC1.2]  SKIN:  Stable diffuse lesions. Small abrasions to left upper extremity.[WC1.1]  PIV l[WC1.2]ine[WC1.1] site lacks inflammation.[WC1.2]  NEUROLOGIC:[WC1.1]  Alert, oriented, moves[WC1.2] extremities[WC1.1] x 4.[WC1.2]         Laboratory Data:     Absolute CD4   Date Value Ref Range Status   12/18/2008 898 mm3 Final     Inflammatory Markers    Recent Labs   Lab Test  11/09/18   0736  11/08/18   0715  11/06/18   0647  11/05/18   0739  11/04/18   1214  11/07/17   1207   10/03/14   1513   SED   --    --    --   48*  18  27*   --   52*   CRP  32.0*  52.0*  150.0*  160.0*  110.0*  10.3*   < >  148.0*    < > = values in this  interval not displayed.     Metabolic Studies       Recent Labs   Lab Test  11/09/18   0736  11/08/18   0715   11/05/18   0739   11/04/18   1214  10/23/18   1116   10/04/14   0734   02/12/12   1850   NA  141  144   < >  139   --   140  138   < >  137   < >  138   POTASSIUM  4.3  3.6   < >  3.6   --   3.3*  3.7   < >  3.7   < >  3.9   CHLORIDE  104  105   < >  107   --   107  103   < >  105   < >  106   CO2  30  30   < >  24   --   27  28   < >  25   < >  21   ANIONGAP  7  8   < >  8   --   6  7   < >  7   < >  11   BUN  19  14   < >  11   --   10  9   < >  9   < >  16   CR  0.75  0.74   < >  0.69   < >  0.69  0.83   < >  0.84   < >  0.85   GFRESTIMATED  >90  >90   < >  >90   < >  >90  >90   < >  >90  Non  GFR Calc     < >  >90   GLC  77  59*   < >  80   --   77  82   < >  95   < >  71   HEMA  8.9  8.8   < >  8.4*   --   8.3*  9.2   < >  8.7   < >  9.3   PHOS   --    --    --   2.9   --    --    --    --    --    < >   --    MAG   --    --    --   1.7   --    --    --    < >   --    < >   --    LACT   --    --    --    --    --   1.1   --    --    --    --   1.1   PCAL   --    --    --    --    --    --    --    --   0.06   --    --    CKT   --    --    --    --    --    --   86   --    --    --    --     < > = values in this interval not displayed.     Hepatic Studies    Recent Labs   Lab Test  10/23/18   1116  03/13/18   0934  09/12/17   0923   06/10/14   1044   BILITOTAL  0.8  0.5  0.9   < >  1.2   BILIDELTA   --    --    --    --   0.1   BILICONJ   --    --    --    --   0.0   DBIL  0.2  0.1  0.2   < >   --    ALKPHOS  87  77  85   < >  81   PROTTOTAL  7.4  6.8  7.2   < >  7.2   ALBUMIN  3.5  3.2*  3.3*   < >  3.9   AST  20  24  20   < >  38   ALT  22  27  24   < >  36    < > = values in this interval not displayed.     Pancreatitis testing    Recent Labs   Lab Test  10/23/18   1116   02/12/12   1850   LIPASE   --    --   94   TRIG  97   < >   --     < > = values in this interval not displayed.      Hematology Studies      Recent Labs   Lab Test  11/09/18   0736  11/08/18   0715   11/06/18   0647  11/05/18   0739   11/04/18   1214  10/23/18   1116   WBC  7.2  6.6   --   6.7  9.6   --   9.6  7.8   ANEU  4.2  3.6   --   3.9  6.9   --   6.2   --    ALYM  2.2  2.0   --   1.9  1.5   --   1.7   --    ALISIA  0.6  0.7   --   0.8  1.1   --   1.7*   --    AEOS  0.3  0.3   --   0.1  0.1   --   0.1   --    HGB  12.4*  12.1*   --   11.6*  11.7*   --   12.5*  14.0   HCT  40.5  39.0*   --   38.4*  38.4*   --   40.4  45.0   PLT  405  342   < >  315  307   < >  307  352    < > = values in this interval not displayed.     Clotting Studies    Recent Labs   Lab Test  03/13/18   0934  09/12/17   0923  03/10/17   1014  08/30/16   1042   10/03/14   1513   INR  0.95  0.86  0.97  0.99   < >  0.99   PTT   --    --    --    --    --   23    < > = values in this interval not displayed.     Iron Testing    Recent Labs   Lab Test  11/09/18   0736   MCV  93     Markers    Alpha Fetoprotein   Date Value Ref Range Status   07/08/2015 3.8 0 - 8 ug/L Final     Thyroid Studies     Recent Labs   Lab Test  11/07/17   1207  05/06/15   1505  02/08/11   1022   TSH  1.73  0.56  3.54     Urine Studies     Recent Labs   Lab Test  11/04/18   2128  01/05/11   0916   URINEPH  6.5  6.0   NITRITE  Negative  Negative   LEUKEST  Negative  Negative   WBCU  1  0     Medication levels    Recent Labs   Lab Test  11/06/18   0647  06/21/18   1154   10/05/14   0403   VANCOMYCIN   --    --    --   14.6   CYCLSP   --   <25*   --    --    MPACID  0.55*   --    < >   --    MPAG  29.5*   --    < >   --     < > = values in this interval not displayed.     Body fluid stats    Recent Labs   Lab Test  11/08/18   1020   GS  No organisms seen  Rare  WBC'S seen       Microbiology:    Last Culture results with specimen source  Culture Micro   Date Value Ref Range Status   11/08/2018 Culture negative monitoring continues  Preliminary   11/04/2018 No growth after 5 days   Preliminary   11/04/2018 No growth after 5 days  Preliminary   05/24/2015   Final    No Salmonella, Shigella, Campylobacter, E. coli O157, Aeromonas, or Plesiomonas   isolated.     10/06/2014 No growth  Final   10/06/2014 No growth  Final   10/04/2014 No growth  Final   10/04/2014 No growth  Final   10/03/2014 No growth  Final   10/03/2014   Final    Cancelled by lab - Specimen never received. Notified Naomi Singh RN (5B) @ 723.    10/05/14     10/03/2014 (A)  Final    Cultured on the 1st day of incubation: Corynebacterium species  Critical Value/Significant Value, preliminary result only, called to and read   back by Chang King RN on [id number] on [id number]      07/08/2005 No growth  Final   07/08/2005 No anaerobes isolated  Final   06/01/2005 No growth  Final   06/01/2005 No anaerobes isolated  Final    Specimen Description   Date Value Ref Range Status   11/08/2018 Cyst fluid spinal  Final   11/08/2018 Cyst fluid spinal  Final   11/05/2018 Nares  Final   11/04/2018 Blood Left Arm  Final   11/04/2018 Blood Right Hand  Final   04/10/2018 Nasal  Final   02/15/2018 Nasal  Final     Comment:     Swab   05/24/2015 Feces  Final   05/24/2015 Feces  Final   10/07/2014 Urine  Final   10/06/2014 Blood Right Hand  Final   10/06/2014 Blood Right Hand  Final   10/04/2014 Blood Right Hand  Final   10/04/2014 Blood Right Arm  Final   10/03/2014 Blood Right Arm  Final   10/03/2014 Blood  Final   10/03/2014 Blood Right Hand  Final   02/13/2012 Plasma  Final   07/08/2005 Other LEFT FEMORAL CANAL  Final   07/08/2005 Other LEFT FEMORAL CANAL  Final   07/08/2005 Other LEFT FEMORAL CANAL  Final        Last check of C difficile  C Diff Toxin B PCR   Date Value Ref Range Status   05/24/2015 (A) NEG Final    Formed stools are not acceptable, by national guidelines, for the detection of   Clostridium difficile toxin and culture.   Canceled, Test credited  SPOKE WITH ESMER JACKSON.       Quantiferon testing   Recent Labs   Lab Test  08/30/16    1043   TBRSLT  Negative   TBAGN  0.00     Virology:    CMV viral loads  Recent Labs   Lab Test  09/12/17   0923  02/14/12   0556   CSPEC  Plasma, EDTA anticoagulant  Whole blood, EDTA anticoagulant   CMVLOG  Not Calculated   --      Log IU/mL of CMVQNT   Date Value Ref Range Status   09/12/2017 Not Calculated <2.1 [Log_IU]/mL Final     EBV DNA Copies/mL   Date Value Ref Range Status   11/06/2018 EBV DNA Not Detected EBVNEG^EBV DNA Not Detected [Copies]/mL Final   08/02/2017 EBV DNA Not Detected EBVNEG [Copies]/mL Final     Hepatitis B Testing     Recent Labs   Lab Test  06/10/14   1044  09/10/13   1025  05/23/13   0855  01/05/11   0909   HBSAB   --    --    --   0.0   HBCAB   --    --    --   Positive*   HEPBANG   --   Positive*   --   Positive*   HBEABY   --   Positive Reference range: Negative (Note) The anti-HBe is repeatedly reactive, which is consistent with recent or remote Hepatitis B infection. Anti-HBe can be present in a small proportion of chronic HBV infections, but its presence usually indicates resolution. If HBeAg is also positive, this may represent the transition between the appearance of anti-HBe and decline of HBeAg, and retesting in one month may be useful. Performed by [facility], [address] [place], [place] [id number] [phone] [email or web address], Villa Chandra MD, Lab. Director*   --   Positive   HBEAGN   --   Negative Reference range: Negative (Note) Performed by [facility], [address] [place] [id number] [phone] [email or web address], Villa Chandra MD, Lab. Director   --   Negative   HBQTT  Not Quantified  Unit: IU/mL    32  470,000 Unit: IU/mL  14,000        Hepatitis C Antibody   Date Value Ref Range Status   12/18/2008 Negative NEG Final       CMV IgG Antibody   Date Value Ref Range Status   02/14/2012 <0.20  Negative for anti-CMV IgG U/mL Final     CMV IgM Antibody   Date Value Ref Range Status   02/14/2012 <8.00  No detectable antibody. AU/mL Final     No results found for:  EBVCAG, EBIG2, EBIGM, EBVIGG, EBIGG, EBVAGN, QL1829, TOXG  No results found for: H1IGG, H2IGG, EBVCAM    Imaging:  Recent Results (from the past 48 hour(s))   XR Lumbar Puncture Therapeutic    Narrative    FLUOROSCOPY GUIDED LUMBAR SPINE ASPIRATION    History: Patient with history of L4-L5 septic bursa cyst  aspirate/tissue culture here for fluoroscopy guided aspiration.    Radiologists: LAUREL Lopez MD ; EMA Carbone MD    Fluoroscopy time: 30 seconds     IV contrast: None.    Complications: None.    Consent: Informed written and verbal consent obtained.    Procedure/Findings: The patient was placed on the fluoroscopy table  and the prone position. The L4-5 interspinous space was identified  under fluoroscopy. The overlying skin was prepped and draped in the  usual sterile fashion. Approximately 10 ml 1% lidocaine was used for  local anesthesia. Under fluoroscopic guidance , a 20 gauge spinal  needle was advanced into the interspinous space at L4-5. Aspiration  was performed and approximately 0.25 mL of viscous fluid was obtained.  The needle was removed and immediate hemostasis was achieved. No  immediate complication.       Impression    Impression: Uncomplicated fluoroscopic-guided aspiration of L4-5  interspinous bursal cyst.    I, ARVIN LOPEZ MD, attest that I was present in the procedure  room for the entire procedure.    I have personally reviewed the examination and initial interpretation  and I agree with the findings.    ARVIN LOPEZ MD[WC1.1]        Revision History        User Key Date/Time User Provider Type Action    > WC1.2 11/10/2018 12:56 AM Krishna Frye MD Physician Sign     WC1.3 11/9/2018  7:03 PM Krishna Frye MD Physician      WC1.1 11/9/2018  7:01 PM Krishna Frye MD Physician             Progress Notes by Tree Stahl DO at 11/9/2018  9:04 AM     Author:  Tree Stahl DO Service:  Family Medicine Author Type:  Resident    Filed:  11/9/2018  5:31 PM Date of  Service:  2018  9:04 AM Creation Time:  2018  9:04 AM    Status:  Attested :  Tree Stahl DO (Resident)    Cosigner:  Torsten Villanueva MD at 2018 10:24 PM        Attestation signed by Torsten Villanueva MD at 2018 10:24 PM        Attestation:  This patient has been seen and evaluated by me, Torsten Villanueva on 2018.  I saw and discussed the case with the primary resident and the care team. I agree with the findings and plan in this note. I have reviewed today's vital signs, medications, laboratory results.   08542 (25-34 minutes) Inpatient Subsequent Moderate complexity/severity.  I spent 30 minutes with patient and on the floor managing the patient.  [facility]                                     [facility], [facility] Progress Note - [place] Service   Main Plans for Today   -[MG1.1] Place PICC line  - antibiotic plan for discharge[MG1.2]: Ceftriaxone 2g every day for 4 weeks[JP1.1]    Assessment & Plan   Jerad Ross is a 58 year old male admitted on 2018. He has a history of  donor renal transplant, skin cancer, chronic back pain, chronic anemia, vitamin D deficiency, essential hypertension, coronary artery disease and is admitted for L4-L5 infected interspinous bursal cyst with adjacent reactive epidural thickening and paraspinous cellulitis[MG1.1].[JP1.1]     # Acute back pain  # Infected L4-5 interspinous bursal cyst  # Paraspinous cellulitis  IR aspirated cyst , Gram stain and culture[MG1.1] so far without growth (has received several days of antibiotics)[MG1.2]. Transplant ID following. Symptoms are improving - pain reduced and able to get out of bed and walk with less pain.  Neurosurgery signed off, no surgical interventions needed at this time.  - Transplant ID following, appreciate recs  --- Continue ceftriaxone 2g IV daily for total of[MG1.1] 4 weeks (on day 5)[MG1.2]  ---  Recheck CRP on 11/10 to follow per ID recs[MG1.1]  - Place PICC line today[MG1.2]  - PO pain control  - Probiotic to decrease risk of antibiotic associated diarrhea and C. Diff infection  - Avoid ibuprofen/NSAIDS given renal transplant    # Crackles on lung exam  Likely atelectasis due to lying in hospital bed. No fever, leukocytosis, or cough to suggest a pneumonia.[MG1.1] Now improved[MG1.2].  - Incentive spirometry  - Encourage frequent walking as able[MG1.1]    # Uvula swelling  Patient had some uvula swelling and mildly sore throat on morning of 11/9. States this happens to him about once per month at home, usually when he sleeps while breathing through his mouth. Improved an hour later on recheck. He had no facial swelling, tongue swelling, or difficulty breathing. No need for acute intervention. Low suspicion for anaphylaxis as patient has been on ceftriaxone for several days without reaction.  - Monitor[MG1.2]     # DDKT (Baseline creatinine 0.8-1.1)  Cr has been normal at 0.69-0.75 while in hospital.  - Consulted transplant nephrology, appreciate recs  - Continue CellCept at current dose per nephrology because dose is minimal, await transplant ID recs  - Continue PTA prednisone 5mg daily, consider stress dose steroids if signs of sepsis or hypotension  - Avoid nephrotoxic agents     # Chronic Hepatitis B  Continue PTA entecavir     # Major Depressive Disorder  Continue PTA Fluoxetine     # CAD  # HTN  Continue PTA IMDUR, Plavix, ASA 81, Amlodipine, lipitor     # Restrictive pattern on PFTs  PFTs showing mildly restrictive pattern, seen by pulmonology who said it could be mild asthma.  - Continue PTA advair, PRN albuterol    # Pain Assessment:  Current Pain Score 11/9/2018   Patient currently in pain? yes   Pain score (0-10) -   Pain location Back   Pain descriptors Aching   - Jerad is experiencing pain due to lumbar intraspinous septic bursitis. Pain management was discussed and the plan was created in a  collaborative fashion.  Jerad's response to the current recommendations: engaged  - Please see the plan for pain management as documented above    Diet: Regular Diet Adult  Fluids: PO  DVT Prophylaxis: Lovenox (held for IR procedure currently)  Code Status: Full Code    Disposition Plan   Expected discharge:[MG1.1] Tomorrow[JP1.1], recommended to transitional care unit once adequate pain management/ tolerating PO medications, antibiotic plan established and safe disposition plan/ TCU bed available. Dispo:[MG1.1] Expected Discharge Date: 11/10/18 (TCU)[JP1.2]        Entered: Kaelyn Salguero 2018, 9:04 AM   Information in the above section will display in the discharge planner report.      The patient's care was discussed with the Attending Physician, Dr. Villanueva.    Kaelyn Salguero, MS3  [facility]  Pager: [phone]  Please see sticky note for cross cover information    Hospital Course  Jerad Ross is a 58 year old male with a history of  donor renal transplant (baseline Cr 0.8-1.1), HTN, CAD, chronic cough, anemia in chronic renal disease, vitamin D deficiency, chronic hepatitis B, basal cell carcinoma, squamous cell skin carcinoma, who was admitted 2018 for acute mid-lumbar back pain, L>R, which developed 36 hours PTA and progressively worsened. He also had a fever up to 100.8. Lumbar spine MRI showed L4-5 interspinous bursal cyst with adjacent reactive epidural thickening and paraspinous cellulitis. Neurosurgery consulted, recommended no surgical intervention at this time. IR consulted for biopsy/aspiration of the cyst to clarify diagnosis[MG1.1], so far no growth[MG1.2]. Currently on ceftriaxone per transplant ID recommendations.     Interval History[MG1.1]   Feeling well this morning. Pain continues to improve. Able to walk with PT and OT and to and from the bathroom, sometimes uses walker. No fevers or chills. Woke up with some  throat pain and swelling. No difficulty breathing. Improved on recheck an hour later.[MG1.2]    Physical Exam   Vital Signs: Temp: 96.2  F (35.7  C) Temp src: Oral BP: 137/84 Pulse: 51 Heart Rate: 62 Resp: 16 SpO2: 95 % O2 Device: None (Room air)    Weight: 171 lbs 11.2 oz  General:[MG1.1] laying in bed, no acute distress[MG1.2]  HEENT: normocephalic, atraumatic  Respiratory: lungs with mild crackles in the bases bilaterally, no wheezes  Cardiac: regular rate and rhythm, S1/S2 heard, no murmurs, rubs, or gallops  Abdominal: non-distended, normal bowel sounds, soft, non-tender to palpation in all quadrants  Musculoskeletal: no tenderness to palpation of lumbar spinal region of back  Skin: many brown, raised, ~2 mm lesions throughout all exposed skin areas  Neurologic: cranial nerves grossly intact  Psychiatric: appropriate mood and affect    Data     Recent Labs  Lab 11/09/18  0736 11/08/18  0715 11/07/18  0801 11/06/18  0647   WBC 7.2 6.6  --  6.7   HGB 12.4* 12.1*  --  11.6*   MCV 93 92  --  93    342 350 315    144 140 142   POTASSIUM 4.3 3.6 3.7 3.6   CHLORIDE 104 105 104 108   CO2 30 30 28 27   BUN 19 14 15 13   CR 0.75 0.74 0.69 0.76   ANIONGAP 7 8 9 7   HEMA 8.9 8.8 8.8 8.7   GLC 77 59* 82 91       Recent Results (from the past 24 hour(s))   XR Lumbar Puncture Therapeutic    Narrative    FLUOROSCOPY GUIDED LUMBAR SPINE ASPIRATION    History: Patient with history of L4-L5 septic bursa cyst  aspirate/tissue culture here for fluoroscopy guided aspiration.    Radiologists: LAUREL Carpenter MD ; EMA Carbone MD    Fluoroscopy time: 30 seconds     IV contrast: None.    Complications: None.    Consent: Informed written and verbal consent obtained.    Procedure/Findings: The patient was placed on the fluoroscopy table  and the prone position. The L4-5 interspinous space was identified  under fluoroscopy. The overlying skin was prepped and draped in the  usual sterile fashion. Approximately 10 ml 1% lidocaine  was used for  local anesthesia. Under fluoroscopic guidance , a 20 gauge spinal  needle was advanced into the interspinous space at L4-5. Aspiration  was performed and approximately 0.25 mL of viscous fluid was obtained.  The needle was removed and immediate hemostasis was achieved. No  immediate complication.       Impression    Impression: Uncomplicated fluoroscopic-guided aspiration of L4-5  interspinous bursal cyst.    I, ARVIN LOPEZ MD, attest that I was present in the procedure  room for the entire procedure.    I have personally reviewed the examination and initial interpretation  and I agree with the findings.    ARVIN LOPEZ MD     Medications       amLODIPine  5 mg Oral Daily     aspirin  81 mg Oral Daily     atorvastatin  20 mg Oral Daily     cefTRIAXone  2 g Intravenous Q24H     docusate sodium  100 mg Oral BID     entecavir  0.5 mg Oral Daily     FLUoxetine  40 mg Oral QAM     isosorbide mononitrate  15 mg Oral Daily     latanoprost  1 drop Both Eyes At Bedtime     mycophenolate  750 mg Oral BID     omeprazole  20 mg Oral QAM     polyethylene glycol  17 g Oral Daily     predniSONE  5 mg Oral Daily     sodium chloride (PF)  3 mL Intracatheter Q8H     Resident/Fellow Attestation   I, Tree Stahl, was present with the medical student who participated in the service and in the documentation of the note.  I have verified the history and personally performed the physical exam and medical decision making.  I agree with the assessment and plan of care as documented in the note.      Tree Stahl DO, MA  Family Medicine PGY-2  [facility], [facility][MG1.1]       Revision History        User Key Date/Time User Provider Type Action    > JP1.2 11/9/2018  5:31 PM Tree Stahl DO Resident Sign     JP1.1 11/9/2018  5:30 PM Tree Stahl DO Resident      MG1.2 11/9/2018  5:09 PM Kaelyn Salguero Medical Student Share     MG1.1 11/9/2018  9:04 AM  Kaelyn Salguero Medical Student                   Procedure Notes     No notes of this type exist for this encounter.      Progress Notes - Therapies (Notes from 11/07/18 through 11/10/18)     No notes of this type exist for this encounter.                                          INTERAGENCY TRANSFER FORM - LAB / IMAGING / EKG / EMG RESULTS   11/4/2018                       UNIT [facility]: [phone]            Unresulted Labs     None         Lab Results - 3 Days      Blood culture [[id number]]  Resulted: 11/10/18 0727, Result status: Final result    Ordering provider: Genia Matthews MD  11/04/18 1402 Resulting lab: [facility]    Specimen Information    Type Source Collected On   Blood Hand, Right 11/04/18 1422   Comment:  Right Hand          Components       Value Reference Range Flag Lab   Specimen Description Blood Right Hand      Special Requests Received in aerobic bottle only   75   Culture Micro No growth   75            Blood culture [[id number]]  Resulted: 11/10/18 0727, Result status: Final result    Ordering provider: Genia Matthews MD  11/04/18 1402 Resulting lab: [facility]    Specimen Information    Type Source Collected On   Blood Arm, Left 11/04/18 1427   Comment:  Left Arm          Components       Value Reference Range Flag Lab   Specimen Description Blood Left Arm      Special Requests Received in aerobic bottle only   75   Culture Micro No growth   75            Basic metabolic panel [[id number]]  Resulted: 11/09/18 0822, Result status: Final result    Ordering provider: Tree Stahl DO  11/09/18 0100 Resulting lab: [facility]    Specimen Information    Type Source Collected On   Blood  11/09/18 0736          Components       Value Reference Range Flag Lab   Sodium 141 133 - 144 mmol/L  51   Potassium 4.3 3.4 - 5.3 mmol/L  51   Chloride 104 94 - 109 mmol/L  51   Carbon  Dioxide 30 20 - 32 mmol/L  51   Anion Gap 7 3 - 14 mmol/L  51   Glucose 77 70 - 99 mg/dL  51   Urea Nitrogen 19 7 - 30 mg/dL  51   Creatinine 0.75 0.66 - 1.25 mg/dL  51   GFR Estimate >90 >60 mL/min/1.7m2  51   Comment:  Non  GFR Calc   GFR Estimate If Black >90 >60 mL/min/1.7m2  51   Comment:  African American GFR Calc   Calcium 8.9 8.5 - 10.1 mg/dL  51            CRP inflammation [[id number]] (Abnormal)  Resulted: 11/09/18 0822, Result status: Final result    Ordering provider: Tree Stahl DO  11/09/18 0100 Resulting lab: [facility]    Specimen Information    Type Source Collected On   Blood  11/09/18 0736          Components       Value Reference Range Flag Lab   CRP Inflammation 32.0 0.0 - 8.0 mg/L H 51            CBC with platelets differential [[id number]] (Abnormal)  Resulted: 11/09/18 0804, Result status: Final result    Ordering provider: Tree Stahl DO  11/09/18 0100 Resulting lab: [facility]    Specimen Information    Type Source Collected On   Blood  11/09/18 0736          Components       Value Reference Range Flag Lab   WBC 7.2 4.0 - 11.0 10e9/L  51   RBC Count 4.36 4.4 - 5.9 10e12/L L 51   Hemoglobin 12.4 13.3 - 17.7 g/dL L 51   Hematocrit 40.5 40.0 - 53.0 %  51   MCV 93 78 - 100 fl  51   MCH 28.4 26.5 - 33.0 pg  51   MCHC 30.6 31.5 - 36.5 g/dL L 51   RDW 15.1 10.0 - 15.0 % H 51   Platelet Count 405 150 - 450 10e9/L  51   Diff Method Automated Method   51   % Neutrophils 57.4 %  51   % Lymphocytes 30.3 %  51   % Monocytes 7.6 %  51   % Eosinophils 3.9 %  51   % Basophils 0.7 %  51   % Immature Granulocytes 0.1 %  51   Nucleated RBCs 0 0 /100  51   Absolute Neutrophil 4.2 1.6 - 8.3 10e9/L  51   Absolute Lymphocytes 2.2 0.8 - 5.3 10e9/L  51   Absolute Monocytes 0.6 0.0 - 1.3 10e9/L  51   Absolute Eosinophils 0.3 0.0 - 0.7 10e9/L  51   Absolute Basophils 0.1 0.0 - 0.2 10e9/L  51   Abs Immature Granulocytes 0.0 0 - 0.4  10e9/L  51   Absolute Nucleated RBC 0.0   51            Fluid Culture Aerobic Bacterial [[id number]]  Resulted: 11/09/18 0732, Result status: Preliminary result    Ordering provider: Tree Stahl DO  11/07/18 0954 Resulting lab: [facility]    Specimen Information    Type Source Collected On   Cyst Fluid Cyst 11/08/18 1020   Comment:  spinal          Components       Value Reference Range Flag Lab   Specimen Description Cyst fluid spinal      Culture Micro Culture negative monitoring continues   225            Gram stain [[id number]]  Resulted: 11/08/18 1406, Result status: Final result    Ordering provider: Tree Stahl DO  11/07/18 0954 Resulting lab: [facility]    Specimen Information    Type Source Collected On   Cyst Fluid  11/08/18 1020   Comment:  spinal          Components       Value Reference Range Flag Lab   Specimen Description Cyst fluid spinal      Gram Stain No organisms seen   226   Gram Stain --   226   Result:         Rare  WBC'S seen              Glucose by meter [[id number]]  Resulted: 11/08/18 0850, Result status: Final result    Ordering provider: Torsten Villanueva MD  11/08/18 0837 Resulting lab: POINT OF CARE TEST, GLUCOSE    Specimen Information    Type Source Collected On     11/08/18 0837          Components       Value Reference Range Flag Lab   Glucose 83 70 - 99 mg/dL  170   Comment:  /RN Notified            CRP inflammation [[id number]] (Abnormal)  Resulted: 11/08/18 0749, Result status: Final result    Ordering provider: Tree Stahl DO  11/08/18 0100 Resulting lab: [facility]    Specimen Information    Type Source Collected On   Blood  11/08/18 0715          Components       Value Reference Range Flag Lab   CRP Inflammation 52.0 0.0 - 8.0 mg/L H 51            Basic metabolic panel [[id number]] (Abnormal)  Resulted: 11/08/18 0749, Result status: Final result    Ordering provider: Tree Stahl DO  11/08/18  0100 Resulting lab: [facility]    Specimen Information    Type Source Collected On   Blood  11/08/18 0715          Components       Value Reference Range Flag Lab   Sodium 144 133 - 144 mmol/L  51   Potassium 3.6 3.4 - 5.3 mmol/L  51   Chloride 105 94 - 109 mmol/L  51   Carbon Dioxide 30 20 - 32 mmol/L  51   Anion Gap 8 3 - 14 mmol/L  51   Glucose 59 70 - 99 mg/dL L 51   Urea Nitrogen 14 7 - 30 mg/dL  51   Creatinine 0.74 0.66 - 1.25 mg/dL  51   GFR Estimate >90 >60 mL/min/1.7m2  51   Comment:  Non  GFR Calc   GFR Estimate If Black >90 >60 mL/min/1.7m2  51   Comment:  African American GFR Calc   Calcium 8.8 8.5 - 10.1 mg/dL  51            CBC with platelets differential [[id number]] (Abnormal)  Resulted: 11/08/18 0727, Result status: Final result    Ordering provider: Tree Stahl DO  11/08/18 0100 Resulting lab: [facility]    Specimen Information    Type Source Collected On   Blood  11/08/18 0715          Components       Value Reference Range Flag Lab   WBC 6.6 4.0 - 11.0 10e9/L  51   RBC Count 4.25 4.4 - 5.9 10e12/L L 51   Hemoglobin 12.1 13.3 - 17.7 g/dL L 51   Hematocrit 39.0 40.0 - 53.0 % L 51   MCV 92 78 - 100 fl  51   MCH 28.5 26.5 - 33.0 pg  51   MCHC 31.0 31.5 - 36.5 g/dL L 51   RDW 15.3 10.0 - 15.0 % H 51   Platelet Count 342 150 - 450 10e9/L  51   Diff Method Automated Method   51   % Neutrophils 53.4 %  51   % Lymphocytes 30.6 %  51   % Monocytes 10.2 %  51   % Eosinophils 5.1 %  51   % Basophils 0.5 %  51   % Immature Granulocytes 0.2 %  51   Nucleated RBCs 0 0 /100  51   Absolute Neutrophil 3.6 1.6 - 8.3 10e9/L  51   Absolute Lymphocytes 2.0 0.8 - 5.3 10e9/L  51   Absolute Monocytes 0.7 0.0 - 1.3 10e9/L  51   Absolute Eosinophils 0.3 0.0 - 0.7 10e9/L  51   Absolute Basophils 0.0 0.0 - 0.2 10e9/L  51   Abs Immature Granulocytes 0.0 0 - 0.4 10e9/L  51   Absolute Nucleated RBC 0.0   51            EBV DNA PCR Quantitative  Whole Blood [[id number]]  Resulted: 11/07/18 1419, Result status: Final result    Ordering provider: Enrique Enriquez MD  11/06/18 0100 Resulting lab: [facility]    Specimen Information    Type Source Collected On   Blood  11/06/18 0647          Components       Value Reference Range Flag Lab   EBV DNA Copies/mL EBV DNA Not Detected EBVNEG^EBV DNA Not Detected {Copies}/mL  75   EBV DNA Log of Copies Not Calculated <2.7 {Log_copies}/mL  75   Comment:         The Real-Time quantitative EBV assay was developed and its performance   characteristics determined by the [facility] at   the [facility] in [place], [place].  The   primers and probes are Analyte Specific Reagents (ASRs) manufactured  by   [employer].  ASRs are used in many laboratory tests necessary for standard medical care and   generally do not require U.S. Food and Drug Administration approval.  The FDA   has determined that such clearance or approval is not necessary.  This test   is used for clinical purposes.  It should not be regarded as investigational   or research.  This laboratory is certified under the Clinical Laboratory Improvement   Amendments of 1988 (CLIA-88) as qualified to perform high complexity clinical   laboratory testing.  The quantitative range of this assay is 500-22,500,00 copies/mL (2.7-7.4 log   copies/mL).  A negative result does not rule out the presence of PCR   inhibitors in the patient spe  cimen or EBV DNA nucleic acid in concentrations   below the level of detection of the assay.  Inhibition may also lead to   underestimation of viral quantitation.              Basic metabolic panel [[id number]]  Resulted: 11/07/18 0835, Result status: Final result    Ordering provider: Tree Stahl DO  11/07/18 0100 Resulting lab: [facility]    Specimen Information    Type Source Collected On   Blood  11/07/18  0801          Components       Value Reference Range Flag Lab   Sodium 140 133 - 144 mmol/L  51   Potassium 3.7 3.4 - 5.3 mmol/L  51   Chloride 104 94 - 109 mmol/L  51   Carbon Dioxide 28 20 - 32 mmol/L  51   Anion Gap 9 3 - 14 mmol/L  51   Glucose 82 70 - 99 mg/dL  51   Urea Nitrogen 15 7 - 30 mg/dL  51   Creatinine 0.69 0.66 - 1.25 mg/dL  51   GFR Estimate >90 >60 mL/min/1.7m2  51   Comment:  Non  GFR Calc   GFR Estimate If Black >90 >60 mL/min/1.7m2  51   Comment:  African American GFR Calc   Calcium 8.8 8.5 - 10.1 mg/dL  51            Platelet count [[id number]]  Resulted: 11/07/18 0811, Result status: Final result    Ordering provider: Lina Cedeno MD  11/07/18 0000 Resulting lab: [facility]    Specimen Information    Type Source Collected On   Blood  11/07/18 0801          Components       Value Reference Range Flag Lab   Platelet Count 350 150 - 450 10e9/L  51            Mycophenolic acid [[id number]] (Abnormal)  Resulted: 11/07/18 0655, Result status: Final result    Ordering provider: Enrique Enriquez MD  11/06/18 0100 Resulting lab: [facility]    Specimen Information    Type Source Collected On   Blood  11/06/18 0647          Components       Value Reference Range Flag Lab   Last Dose Mycophenolic Acid Not Provided   51   Mycophenolic Acid Mg/L 0.55 1.00 - 3.50 mg/L L 51   MPA Glucuronide Level 29.5 30.0 - 95.0 mg/L L 51   Comment:         This test was developed and its performance characteristics determined by the   [facility]  [facility]. It has   not been cleared or approved by the FDA. The laboratory is regulated under   CLIA as qualified to perform high-complexity testing. This test is used for   clinical purposes. It should not be regarded as investigational or for   research.              Testing Performed By     Lab - Abbreviation Name Director Address Valid Date  Range    51 - Unknown [facility] [place] Unknown [address] [address] 12/31/14 1010 - Present    75 - Unknown [facility] Unknown [address] [facility] [address] 01/15/15 1019 - Present    170 - Unknown POINT OF CARE TEST, GLUCOSE Unknown Unknown 10/31/11 1114 - Present    225 - Unknown INFECTIOUS DISEASE DIAGNOSTIC LABORATORY Unknown [address] [facility] [address] 12/19/14 0954 - Present    226 - Unknown MICRO RAPID TESTING LAB Unknown [address] [facility] [address] 12/19/14 0955 - Present               Imaging Results - 3 Days      XR Chest 1 View [[id number]]  Resulted: 11/10/18 1141, Result status: Final result    Ordering provider: Adi Shin MD  11/10/18 1113 Resulted by: Filemon Goel MD Evelsizer, Andrew    Performed: 11/10/18 1123 - 11/10/18 1124 Resulting lab: RADIOLOGY RESULTS    Narrative:       Exam:  XR CHEST 1 VW, 11/10/2018 11:28 AM    History: PICC line;     Comparison:  Chest CT 6/7/2018.    Findings:  Single PA view of the chest. Right arm PICC tip projects  over the low SVC. Cardiac silhouette is within normal limits. No  pleural effusion or pneumothorax. No focal airspace opacities.  Visualized upper abdomen and bones are unremarkable.      Impression:       Impression:    Right arm PICC tip projects over the low SVC. No acute airspace  disease.    I have personally reviewed the examination and initial interpretation  and I agree with the findings.    FILEMON GOEL MD      XR Lumbar Puncture Therapeutic [[id number]]  Resulted: 11/08/18 1159, Result status: Final result    Ordering provider: Kalen Emerson MD  11/07/18 1517 Resulted by: Santana Carpenter MD Gasparetto, Alessandro, MD    Performed: 11/08/18 0941 - 11/08/18 1107 Resulting lab: RADIOLOGY RESULTS    Narrative:       FLUOROSCOPY GUIDED LUMBAR SPINE ASPIRATION    History: Patient with history of  L4-L5 septic bursa cyst  aspirate/tissue culture here for fluoroscopy guided aspiration.    Radiologists: LAUREL Carpenter MD ; EMA Carbone MD    Fluoroscopy time: 30 seconds     IV contrast: None.    Complications: None.    Consent: Informed written and verbal consent obtained.    Procedure/Findings: The patient was placed on the fluoroscopy table  and the prone position. The L4-5 interspinous space was identified  under fluoroscopy. The overlying skin was prepped and draped in the  usual sterile fashion. Approximately 10 ml 1% lidocaine was used for  local anesthesia. Under fluoroscopic guidance , a 20 gauge spinal  needle was advanced into the interspinous space at L4-5. Aspiration  was performed and approximately 0.25 mL of viscous fluid was obtained.  The needle was removed and immediate hemostasis was achieved. No  immediate complication.       Impression:       Impression: Uncomplicated fluoroscopic-guided aspiration of L4-5  interspinous bursal cyst.    I, ARVIN CARPENTER MD, attest that I was present in the procedure  room for the entire procedure.    I have personally reviewed the examination and initial interpretation  and I agree with the findings.    ARVIN CARPENTER MD      Testing Performed By     Lab - Abbreviation Name Director Address Valid Date Range    104 - Rad R[facility]ts RADIOLOGY RESULTS Unknown Unknown 02/16/05 1553 - Present            Encounter-Level Documents:     There are no encounter-level documents.      Order-Level Documents:     There are no order-level documents.

## 2018-11-04 NOTE — PHARMACY-ADMISSION MEDICATION HISTORY
Admission medication history interview status for the 11/4/2018 admission is complete. See Epic admission navigator for allergy information, pharmacy, prior to admission medications and immunization status.     Medication history interview sources:  Patient, Surescripts      Changes made to PTA medication list (reason)    Added:   -vitamin D3 (dose unknown)    Deleted:   -losartan  -montelukast  -valacyclovir    Changed: n/a      Additional medication history information (including reliability of information, actions taken by pharmacist):  -Patient knew his medications well and was a reliable historian.   -Patient reported that he only needs inhalers and Flonase when he has an active cold. Patient has not picked up budesonide inhaler yet.  -Patient verified that mycophenolate is generic and not brand formulation.  -Patient did not recognize Valtrex and stated that he does not have this medication at home. Unclear if this is an active order still.        Prior to Admission medications    Medication Sig Last Dose Taking? Auth Provider   acetaminophen (TYLENOL) 325 MG tablet Take 1-2 tablets by mouth every 6 hours as needed. 11/3/2018 at PM Yes Reported, Patient   amLODIPine (NORVASC) 5 MG tablet Take 1 tablet (5 mg) by mouth daily 11/3/2018 at Unknown time Yes Balta Orantes MD   aspirin 81 MG tablet Take 81 mg by mouth daily  11/3/2018 at Unknown time Yes Reported, Patient   atorvastatin (LIPITOR) 20 MG tablet Take 1 tablet (20 mg) by mouth daily 11/3/2018 at AM Yes Balta Orantes MD   calcium citrate-vitamin D (CITRACAL) 315-200 MG-UNIT TABS Take 1 tablet by mouth daily. 11/3/2018 at Unknown time Yes Reported, Patient   CHOLECALCIFEROL PO Take 1 tablet by mouth daily Dose unknown 11/3/2018 at Unknown time Yes Unknown, Entered By History   clopidogrel (PLAVIX) 75 MG tablet Take 1 tablet (75 mg) by mouth daily 11/3/2018 at Unknown time Yes Balta Orantes MD   entecavir (BARACLUDE) 0.5 MG  tablet Take 1 tablet (0.5 mg) by mouth daily 11/3/2018 at Unknown time Yes Lina Claros MD   FLUoxetine (PROZAC) 40 MG capsule Take 1 capsule (40 mg) by mouth every morning 11/3/2018 at Unknown time Yes Genia Fraser APRN CNP   ibuprofen (ADVIL,MOTRIN) 200 MG tablet Take 200 mg by mouth every 4 hours as needed for mild pain 11/3/2018 at AM Yes Reported, Patient   isosorbide mononitrate (IMDUR) 30 MG 24 hr tablet Take 0.5 tablets (15 mg) by mouth daily 11/3/2018 at Unknown time Yes Balta Orantes MD   Multiple Vitamins-Iron (ONE DAILY MULTIVITAMIN/IRON) TABS Take 1 tablet by mouth daily 11/3/2018 at Unknown time Yes Unknown, Entered By History   mycophenolate (GENERIC EQUIVALENT) 250 MG capsule Take 3 capsules (750 mg) by mouth 2 times daily 11/3/2018 at Unknown time Yes Nolan Lovett MD   Omega-3 Fatty Acids (OMEGA 3 PO) Take 1 capsule by mouth daily Dose unknown 11/3/2018 at Unknown time Yes Reported, Patient   omeprazole (PRILOSEC OTC) 20 MG tablet Take  by mouth daily. 11/3/2018 at Unknown time Yes Aston Perry MD   predniSONE (DELTASONE) 5 MG tablet Take 1 tablet (5 mg) by mouth daily 11/3/2018 at Unknown time Yes Nolan Lovett MD   albuterol (PROAIR HFA) 108 (90 Base) MCG/ACT Inhaler Inhale 2 puffs into the lungs every 6 hours as needed for shortness of breath / dyspnea or wheezing More than a month at Unknown time  Aston Perry MD   BETA BLOCKER NOT PRESCRIBED, INTENTIONAL, Beta Blocker not prescribed intentionally due to Bradycardia < 50 bpm without beta blocker therapy   Carolyn Espinoza PA-C   BUDESONIDE, INHALATION, 90 MCG/ACT AEPB Inhale 2 puffs into the lungs 2 times daily NOT STARTED  Sisi Lockwood MD   fluticasone (FLONASE) 50 MCG/ACT spray Spray 1-2 sprays into both nostrils daily More than a month at Unknown time  sAton Perry MD   fluticasone-salmeterol (ADVAIR) 250-50 MCG/DOSE diskus inhaler Inhale 1  puff into the lungs every 12 hours More than a month at Unknown time  Wilbert Buck MD   latanoprost (XALATAN) 0.005 % ophthalmic solution Place 1 drop into both eyes At Bedtime 11/2/2018 at PM  Viki Rizzo MD         Medication history completed by: Ida Ragland, Pharmacy student

## 2018-11-04 NOTE — PHARMACY-VANCOMYCIN DOSING SERVICE
Pharmacy Vancomycin Initial Note  Date of Service 2018  Patient's  1960  58 year old, male    Indication: Ventilator-Associated Pneumonia and interspinous infection    Current estimated CrCl = Estimated Creatinine Clearance: 134.3 mL/min (based on Cr of 0.69).    Creatinine for last 3 days  2018: 12:14 PM Creatinine 0.69 mg/dL    Recent Vancomycin Level(s) for last 3 days  No results found for requested labs within last 72 hours.      Vancomycin IV Administrations (past 72 hours)      No vancomycin orders with administrations in past 72 hours.                Nephrotoxins and other renal medications (Future)    Start     Dose/Rate Route Frequency Ordered Stop    18 0330  vancomycin (VANCOCIN) 1,500 mg in sodium chloride 0.9 % 250 mL intermittent infusion      1,500 mg  over 90 Minutes Intravenous EVERY 12 HOURS 18 1500      18 1459  vancomycin (VANCOCIN) 2,000 mg in sodium chloride 0.9 % 500 mL intermittent infusion      2,000 mg  over 2 Hours Intravenous ONCE 18 1500            Contrast Orders - past 72 hours (72h ago through future)    Start     Dose/Rate Route Frequency Ordered Stop    18 1304  gadobutrol (GADAVIST) injection 7.94 mL      0.1 mL/kg × 79.4 kg Intravenous ONCE 18 1303 18 1304                Plan:  1.  Start vancomycin  2000 mg (25 mg/kg) IV once followed by 1500 mg (19 mg/kg) IV q12h.   2.  Goal Trough Level: 15-20 mg/L   3.  Pharmacy will check trough levels as appropriate in 1-3 Days.    4. Serum creatinine levels will be ordered daily for the first week of therapy and at least twice weekly for subsequent weeks.    5. [place] method utilized to dose vancomycin therapy: Method 2    Ronak Krishnamurthy, PharmD

## 2018-11-04 NOTE — LETTER
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]  Acute midline low back pain without sciatica [M54.5]  Admit Date/Time: 2018 10:58 AM  Discharge Date: 11/10/2018   Payor Source: Payor: MEDICARE / Plan: MEDICARE / Product Type: Medicare /     Readmission Assessment Measure (MAICO) Risk Score/category: Low    Plan of Care Goals/Milestone Events:   Patient Goals: to discharge home after IV abx completed          Reason for Communication Hand-off Referral: Other discharge handoff    Discharge Plan: Discharge today 11/10/2018 to   [facility]   [address] [place] [address]  P:[phone]  F:[phone]       Concern for non-adherence with plan of care:   Y/N n  Discharge Needs Assessment:  Needs       Most Recent Value    Equipment Currently Used at Home none    Transportation Available family or friend will provide, car          Follow-up plan:  Future Appointments  Date Time Provider Department Center   2018 6:00 AM Elis Bradford OT [facility] O   2018 6:00 AM [facility] PT OVERFLOW [facility] O   2018 2:05 PM 1[facility]   2018 10:30 AM Sisi Lockwood MD [facility]   2018 12:30 PM Genia Fraser, APRN CNP [facility]PSY [facility]   2019 9:45 AM Viki Rizzo MD [facility]       AVS/Discharge Summary is the source of truth; this is a helpful guide for improved communication of patient story

## 2018-11-04 NOTE — H&P
[facility] Medicine History and Physical        Date of Admission:  2018  Date of Service: 2018         HPI      Chief Complaint   Back pain and fever    History is obtained from the patient    History of Present Illness   Jerad Ross is a 58 year old male with a history of  donor renal transplant with a baseline creatinine of 0.8-1.1, hypertension, coronary artery disease, chronic cough, anemia in chronic renal disease, vitamin D deficiency, chronic hepatitis B, basal cell carcinoma, squamous cell skin carcinoma, who presents for acute back pain.  Patient says that 36 hours ago he developed acute back pain which worsened over the course of the day.  He tried to rest when he got up the pain was absolutely excruciating.  The pain is located in the middle of the lumbar spine, slightly worse on the left than the right.  He says he took some ibuprofen which did not seem to help the pain much.  He felt very hot and his wife took his temperature and it was 100.6.  He also had nausea and actually vomited secondary to the severe pain.  The pain subsided somewhat and he was able to get some sleep overnight but today felt the pain once again worsen.  This prompted his coming to the emergency department.  He once again had a fever of 100.8 in the emergency department.  He denies any radicular pain, urinary retention/incontinence, fecal incontinence, saddle anesthesia, weakness, numbness, tingling of his lower extremities.  He does feel that he is unable to walk due to the severe back pain.  He has never had pain like this.  He does have some intermittent history of some mild low back pain that is sporadic and usually associated with overuse but nothing like this current pain.         History:   Review of Systems   The 10 point Review of Systems is negative other than noted in the HPI or here.   Review of Systems   Constitutional: Negative for  chills and fever.   HENT: Negative for rhinorrhea and sore throat.    Respiratory: Negative for cough and shortness of breath.    Cardiovascular: Negative for chest pain.   Gastrointestinal: Negative for abdominal pain, constipation, diarrhea, nausea and vomiting.   Endocrine: Negative for polydipsia and polyuria.   Genitourinary: Negative for dysuria.   Musculoskeletal: Positive for arthralgias (Left hip and left knee acute on chronic pain) and back pain. Negative for myalgias.   Skin:        Multiple seborrhoic keratoses    Allergic/Immunologic: Negative for food allergies.   Neurological: Negative for light-headedness and headaches.   Hematological: Negative for adenopathy.   Psychiatric/Behavioral: Negative for dysphoric mood.       Past Medical History    I have reviewed this patient's medical history and updated it with pertinent information if needed.   Past Medical History:   Diagnosis Date     AION (acute ischemic optic neuropathy)      Anemia in chronic renal disease      Avascular necrosis of bones of both hips (H)     s/p bilateral hip replacements     Basal cell carcinoma      Chronic hepatitis B (H)      Clostridium difficile colitis      Coronary artery disease involving native coronary artery of native heart without angina pectoris 6/17/2014    Coronary angiogram 6/17/14: Severe distal 3-vessel disease involving small vessels, not amenable to PCI or CABG.     CRP elevated      Depression      Diverticulosis      Dyslipidemia      FSGS (focal segmental glomerulosclerosis)      Gastric ulcer with hemorrhage 2/12/12     GERD (gastroesophageal reflux disease)      Glaucoma     OHTN     Hemorrhoids      Hypertension secondary to other renal disorders      Hypogonadism in male      Immunosuppressed status (H)      Kidney replaced by transplant     focal glomerulosclerosis      NSTEMI (non-ST elevated myocardial infarction) (H) 6/17/2014     JULIANE (obstructive sleep apnea)     Doesn't use CPAP     Paracentral  scotoma     LE     Secondary renal hyperparathyroidism (H)      Squamous cell carcinoma     Reviewed    Past Surgical History   I have reviewed this patient's surgical history and updated it with pertinent information if needed.  Past Surgical History:   Procedure Laterality Date     APPENDECTOMY       CATARACT IOL, RT/LT  4/19/2000    RE     CATARACT IOL, RT/LT  6/1/2000    LE     COLECTOMY SUBTOTAL  1983    10 cm, diverticulitis     COLONOSCOPY  2/13/2012    Procedure:COLONOSCOPY; Surgeon:SLOAN GALLARDO; Location: GI     ESOPHAGOSCOPY, GASTROSCOPY, DUODENOSCOPY (EGD), COMBINED  2/13/2012    Procedure:COMBINED ESOPHAGOSCOPY, GASTROSCOPY, DUODENOSCOPY (EGD); Surgeon:SLOAN GALLARDO; Location: GI     EXTRACAPSULAR CATARACT EXTRATION WITH INTRAOCULAR LENS IMPLANT  4-20-10, 6-1-10    Rt, Lt     HERNIA REPAIR  1995    Lt inguinal     HIP SURGERY      1981, bilat MITZI, revised 2001, 2005     KIDNEY SURGERY  1978 and 1993    transplant     KNEE SURGERY  1983, 1987    bilat TKA     MOHS MICROGRAPHIC PROCEDURE       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, auxiliary spleen     TONSILLECTOMY      Reviewed    Social History   Social History   Substance Use Topics     Smoking status: Former Smoker     Packs/day: 1.00     Years: 9.00     Quit date: 1/1/1988     Smokeless tobacco: Former User     Alcohol use 0.0 oz/week     0 Standard drinks or equivalent per week      Comment: rarely 1 drink per month   Reviewed    Family History   I have reviewed this patient's family history and updated it with pertinent information if needed.   Family History   Problem Relation Age of Onset     Cardiovascular Father      AI with valve repair     Hypertension Father      KIDNEY DISEASE Father      Cancer Paternal Grandmother      ovarian ca     Cerebrovascular Disease Paternal Grandfather      Cerebrovascular Disease Maternal Grandfather      KIDNEY DISEASE Paternal Aunt      Skin Cancer No family hx of      Glaucoma No  family hx of      Macular Degeneration No family hx of      Amblyopia No family hx of    Reviewed    Prior to Admission Medications   Prior to Admission Medications   Prescriptions Last Dose Informant Patient Reported? Taking?   BETA BLOCKER NOT PRESCRIBED, INTENTIONAL,   No No   Sig: Beta Blocker not prescribed intentionally due to Bradycardia < 50 bpm without beta blocker therapy   BUDESONIDE, INHALATION, 90 MCG/ACT AEPB NOT STARTED  No No   Sig: Inhale 2 puffs into the lungs 2 times daily   FLUoxetine (PROZAC) 40 MG capsule 11/3/2018 at Unknown time  No Yes   Sig: Take 1 capsule (40 mg) by mouth every morning   Multiple Vitamins-Iron (ONE DAILY MULTIVITAMIN/IRON) TABS 11/3/2018 at Unknown time  Yes Yes   Sig: Take 1 tablet by mouth daily   Omega-3 Fatty Acids (OMEGA 3 PO)   Yes No   Sig: Take 1 capsule by mouth daily   acetaminophen (TYLENOL) 325 MG tablet 11/3/2018 at PM  Yes Yes   Sig: Take 1-2 tablets by mouth every 6 hours as needed.   albuterol (PROAIR HFA) 108 (90 Base) MCG/ACT Inhaler More than a month at Unknown time  No No   Sig: Inhale 2 puffs into the lungs every 6 hours as needed for shortness of breath / dyspnea or wheezing   amLODIPine (NORVASC) 5 MG tablet 11/3/2018 at Unknown time  No Yes   Sig: Take 1 tablet (5 mg) by mouth daily   aspirin 81 MG tablet 11/3/2018 at Unknown time  Yes Yes   Sig: Take 81 mg by mouth daily    atorvastatin (LIPITOR) 20 MG tablet 11/3/2018 at AM  No Yes   Sig: Take 1 tablet (20 mg) by mouth daily   calcium citrate-vitamin D (CITRACAL) 315-200 MG-UNIT TABS 11/3/2018 at Unknown time  Yes Yes   Sig: Take 1 tablet by mouth daily.   clopidogrel (PLAVIX) 75 MG tablet 11/3/2018 at Unknown time  No Yes   Sig: Take 1 tablet (75 mg) by mouth daily   entecavir (BARACLUDE) 0.5 MG tablet 11/3/2018 at Unknown time  No Yes   Sig: Take 1 tablet (0.5 mg) by mouth daily   fluticasone (FLONASE) 50 MCG/ACT spray More than a month at Unknown time  No No   Sig: Spray 1-2 sprays into both  nostrils daily   fluticasone-salmeterol (ADVAIR) 250-50 MCG/DOSE diskus inhaler More than a month at Unknown time  No No   Sig: Inhale 1 puff into the lungs every 12 hours   ibuprofen (ADVIL,MOTRIN) 200 MG tablet 11/3/2018 at AM  Yes Yes   Sig: Take 200 mg by mouth every 4 hours as needed for mild pain   isosorbide mononitrate (IMDUR) 30 MG 24 hr tablet 11/3/2018 at Unknown time  No Yes   Sig: Take 0.5 tablets (15 mg) by mouth daily   latanoprost (XALATAN) 0.005 % ophthalmic solution 11/2/2018 at PM  No No   Sig: Place 1 drop into both eyes At Bedtime   mycophenolate (GENERIC EQUIVALENT) 250 MG capsule 11/3/2018 at Unknown time  No Yes   Sig: Take 3 capsules (750 mg) by mouth 2 times daily   omeprazole (PRILOSEC OTC) 20 MG tablet   Yes No   Sig: Take  by mouth daily.   predniSONE (DELTASONE) 5 MG tablet 11/3/2018 at Unknown time  No Yes   Sig: Take 1 tablet (5 mg) by mouth daily   valACYclovir (VALTREX) 1000 mg tablet 11/3/2018 at Unknown time  No Yes   Sig: Take 1 tablet (1,000 mg) by mouth 3 times daily      Facility-Administered Medications: None     Allergies   Allergies   Allergen Reactions     Penicillins Shortness Of Breath and Hives     Keflex [Cephalexin Hcl] Other (See Comments)     Pt could not recall reaction     Tetracycline Other (See Comments)     Patient could not recall reaction     Sulfa Drugs Rash           Physical Exam      Vital Signs: Temp: 99.6  F (37.6  C) Temp src: Oral BP: 122/74   Heart Rate: 70 Resp: 16 SpO2: 96 % O2 Device: None (Room air)    Weight: 175 lbs 0 oz    Physical Exam   Constitutional: He is oriented to person, place, and time. He appears well-developed and well-nourished. No distress.   HENT:   Head: Normocephalic and atraumatic.   Neck: Neck supple.   Cardiovascular: Normal rate, regular rhythm and normal heart sounds.    No murmur heard.  Pulmonary/Chest: Effort normal and breath sounds normal. No respiratory distress. He has no wheezes. He has no rales.   Abdominal:  Soft. He exhibits no distension. There is no tenderness. There is no rebound and no guarding.   Musculoskeletal: He exhibits tenderness (Tenderness over L4 and L5 without masses or stepoffs.  Mild left paraspinal tenderness). He exhibits no edema.   Neurological: He is alert and oriented to person, place, and time. He has normal strength. No cranial nerve deficit or sensory deficit. He exhibits normal muscle tone.   No clonus, no motor deficits upper or lower extremities.  Strength 5/5 bilateral lower extremities.  Rectal tone intact.   Skin: Skin is warm and dry.   Psychiatric: He has a normal mood and affect.       Assessment & Plan      Jerad Ross is a 58 year old male admitted on 2018. He has a history of  donor renal transplant, skin cancer, chronic back pain, chronic anemia, vitamin D deficiency, essential hypertension, coronary artery disease and is admitted for L4-L5 infected interspinous bursal cyst with adjacent reactive epidural thickening and paraspinous cellulitis    # Acute back pain  # Infected L4-5 interspinous bursal cyst  # Paraspinous cellulitis  Unclear if this is the cause of the patient's current symptoms or an incidental finding.  However, given the patient's immunocompromised state on immunosuppressants status post kidney transplant, it is reasonable to treat empirically with antibiotics and consult neurosurgery.  Per the MR report there is no drainable fluid collection and this will likely be managed medically with antibiotics but will defer to neurosurgery.  Given the relative rarity of this diagnosis in conjunction with the patient's immunocompromise state it may be reasonable to involve transplant infectious disease for antibiotic choice and duration recommendations.  -Neurosurgery consulted, appreciate recommendations  - Possible IR aspiration for cell count, culture, gram stain  - IV pain control  - Continue vancomycin and ertapenem for now, consider deescalating  when clinically appropriate  - Probiotic to decrease risk of antibiotic associated diarrhea and C. Diff infection  - Avoid ibuprofen/NSAIDS given renal transplant    # DDKT (Baseline creatinine 0.8-1.1)  - Continue CellCept, consider transplant nephrology consult to advise regarding immunosuppression given acute infection  - Continue PTA prednisone 5mg daily, consider stress dose steroids if signs of sepsis or hypotension  - Avoid nephrotoxic agents    # Chronic Hepatitis B  Continue PTA entecavir    # Major Depressive Disorder  Continue PTA Fluoxetine    # CAD  # HTN  Continue PTA IMDUR, Plavix, ASA 81, Amlodipine, lipitor    # Restrictive pattern on PFTs  PFTs showing mildly restrictive pattern, seen by pulmonology who said it could be mild asthma.  - Continue PTA advair, PRN albuterol    # Pain Assessment:  Current Pain Score 11/4/2018   Patient currently in pain? yes   Pain score (0-10) -   Pain location Knee   Pain descriptors Sharp   - Jerad is experiencing pain due to lumbar intraspinous septic bursitis. Pain management was discussed and the plan was created in a collaborative fashion.  Jerad's response to the current recommendations: engaged  - Please see the plan for pain management as documented above        Diet:    Fluids: NS @ 125  DVT Prophylaxis: Pneumatic Compression Devices, will plan on lovenox if no neurosurgery or IR intervention  Code Status: Full Code    Disposition Plan   Expected discharge: 2 - 3 days; recommended to prior living arrangement once adequate pain management/ tolerating PO medications, antibiotic plan established, safe disposition plan/ TCU bed available and SIRS/Sepsis treated. Dispo:          Entered: dAi Shin 11/04/2018, 2:57 PM   Information in the above section will display in the discharge planner report.    The patient was discussed with Dr. Dave Lucero's [facility] Inpatient Service  [facility]   Pager:  [phone]  Please see sticky note for cross cover information      Results:     Data     Recent Labs  Lab 11/04/18  1214   WBC 9.6   HGB 12.5*   MCV 92         POTASSIUM 3.3*   CHLORIDE 107   CO2 27   BUN 10   CR 0.69   ANIONGAP 6   HEMA 8.3*   GLC 77       Most Recent 3 CBC's:  Recent Labs   Lab Test  11/04/18   1214  10/23/18   1116  06/21/18   1154   WBC  9.6  7.8  8.1   HGB  12.5*  14.0  12.9*   MCV  92  90  88   PLT  307  352  332     Most Recent 3 BMP's:  Recent Labs   Lab Test  11/04/18   1214  10/23/18   1116  06/21/18   1154   NA  140  138  142   POTASSIUM  3.3*  3.7  3.8   CHLORIDE  107  103  107   CO2  27  28  27   BUN  10  9  11   CR  0.69  0.83  0.81   ANIONGAP  6  7  7   HEMA  8.3*  9.2  9.1   GLC  77  82  94     Most Recent 3 INR's:  Recent Labs   Lab Test  03/13/18   0934  09/12/17   0923  03/10/17   1014   INR  0.95  0.86  0.97     Most Recent 6 glucoses:  Recent Labs   Lab Test  11/04/18   1214  10/23/18   1116  06/21/18   1154  03/13/18   0934  09/12/17   0923  08/02/17   1412   GLC  77  82  94  87  93  126*     Most Recent Urinalysis:  Recent Labs   Lab Test  01/05/11   0916   COLOR  Yellow   APPEARANCE  Clear   URINEGLC  Negative   URINEBILI  Negative   URINEKETONE  Negative   SG  1.012   UBLD  Negative   URINEPH  6.0   PROTEIN  Negative   NITRITE  Negative   LEUKEST  Negative   RBCU  0   WBCU  0     Most Recent ESR & CRP:  Recent Labs   Lab Test  11/04/18   1214   SED  18   CRP  110.0*     Recent Results (from the past 24 hour(s))   MR Lumbar Spine w/o & w Contrast    Narrative    MR LUMBAR SPINE W/O & W CONTRAST 11/4/2018 1:46 PM    Provided History: lumbar back pain radiating to left hip, fevers,  immune suppressed, rule out osteomyelitis versus abscess; .    Comparison: Abdominal MRI 7/13/2015    Technique: Sagittal T1-weighted and T2-weighted and axial T2-weighted  images of the lumbar spine were obtained without intravenous contrast.  Post intravenous contrast using gadolinium axial  and sagittal  T1-weighted images were obtained with fat saturation.    Contrast: 7.9CC GADAVIST     Findings:   5 lumbar-type vertebrae counting down from the presumed 12th ribs. The  tip the conus medullaris is at L1-2. Cauda equina nerve roots and  visualized thoracic cord are normal.    Asymmetric left facet joint effusions and interspinous edema with 2 mm  dorsal epidural bursal cyst formation. There is abnormal enhancing  edema within the paraspinous musculature bilaterally at L2-pelvis. No  discrete drainable fluid collection. Abnormal epidural  thickening/enhancement L4-S1. No abnormal intrathecal enhancement.    Normal alignment of the lumbar vertebrae. Normal lumbar lordosis. Disc  degeneration at L1-2 with loss of disc height and intradiscal T2  signal. Severe asymmetric right psoas muscular atrophy. Multiple cm  nonenhancing well-circumscribed heterogeneous hemorrhagic signal focus  in the right perivertebral space in the expected location of the right  psoas muscle at the level of L5 measuring up to 2.7 cm in maximal  dimension, unchanged dating back to at least 7/13/2015, probable  chronic organized hematoma.     Findings on a level by level basis are as follows:    T12-L1: No spinal canal or neural foraminal stenosis.    L1-2: Disc bulge. Mild spinal canal stenosis. No significant neural  foraminal stenosis.    L2-3: No spinal canal or neural foraminal stenosis.    L3-4: Disc bulge. Mild bilateral neural foraminal stenosis. No  significant spinal canal stenosis.    L4-5: Disc bulge and facet arthrosis. Moderate bilateral neural  foraminal stenosis. No significant spinal canal stenosis.    L5-S1: Facet arthrosis. No spinal canal or neural foraminal stenosis.    Atrophic kidneys.      Impression    Impression:  1. Findings suspicious for infected L4-5 interspinous bursal cyst with  adjacent reactive epidural thickening and paraspinous cellulitis. No  drainable fluid collection.  2. Multilevel lumbar  spondylosis. Moderate bilateral neural foraminal  stenosis at L4-5. Mild spinal canal stenosis at L1-2.  3. Multiple well-circumscribed hemorrhagic foci in the right  perivertebral soft tissues at L5 in the location of a severely  atrophied right psoas muscle, unchanged dating back to at least  7/13/2015, suspected chronic organized hematoma.    [Result: Infected interspinous bursal cyst]    Finding was identified on 11/4/2018 1:37 PM.     Dr. Han was contacted by Dr. Carpenter at 11/4/2018 1:56 PM and  verbalized understanding of the urgent finding.     ARVIN CARPENTER MD

## 2018-11-04 NOTE — ED TRIAGE NOTES
Pt comes in with lower back pain and n/v that began a few days ago as left hip pain. Also has been having low grade fevers since yesterday. Hx kidney transplant. Alert and oriented, vss.

## 2018-11-04 NOTE — IP AVS SNAPSHOT
[address] [address]    Phone:  [phone]                                       After Visit Summary   11/4/2018    Jerad Ross    MRN: [id number]           After Visit Summary Signature Page     I have received my discharge instructions, and my questions have been answered. I have discussed any challenges I see with this plan with the nurse or doctor.    ..........................................................................................................................................  Patient/Patient Representative Signature      ..........................................................................................................................................  Patient Representative Print Name and Relationship to Patient    ..................................................               ................................................  Date                                   Time    ..........................................................................................................................................  Reviewed by Signature/Title    ...................................................              ..............................................  Date                                               Time          22EPIC Rev 08/18

## 2018-11-04 NOTE — ED NOTES
[facility], [place]   ED Nurse to Floor Handoff     Jerad Ross is a 58 year old male who speaks English and lives with a spouse,  in a home  They arrived in the ED by car from home.    ED Chief Complaint: Back Pain    ED Dx;   Final diagnoses:   Infection of lumbar spine (H)         Needed?: No    Allergies:   Allergies   Allergen Reactions     Penicillins Shortness Of Breath and Hives     Keflex [Cephalexin Hcl] Other (See Comments)     Pt could not recall reaction     Tetracycline Other (See Comments)     Patient could not recall reaction     Sulfa Drugs Rash   .  Past Medical Hx:   Past Medical History:   Diagnosis Date     AION (acute ischemic optic neuropathy)      Anemia in chronic renal disease      Avascular necrosis of bones of both hips (H)     s/p bilateral hip replacements     Basal cell carcinoma      Chronic hepatitis B (H)      Clostridium difficile colitis      Coronary artery disease involving native coronary artery of native heart without angina pectoris 6/17/2014    Coronary angiogram 6/17/14: Severe distal 3-vessel disease involving small vessels, not amenable to PCI or CABG.     CRP elevated      Depression      Diverticulosis      Dyslipidemia      FSGS (focal segmental glomerulosclerosis)      Gastric ulcer with hemorrhage 2/12/12     GERD (gastroesophageal reflux disease)      Glaucoma     OHTN     Hemorrhoids      Hypertension secondary to other renal disorders      Hypogonadism in male      Immunosuppressed status (H)      Kidney replaced by transplant     focal glomerulosclerosis      NSTEMI (non-ST elevated myocardial infarction) (H) 6/17/2014     JULIANE (obstructive sleep apnea)     Doesn't use CPAP     Paracentral scotoma     LE     Secondary renal hyperparathyroidism (H)      Squamous cell carcinoma       Baseline Mental status: WDL  Current Mental Status changes: at basesline    Infection present or suspected this encounter: yes other  spinal  Sepsis suspected: No  Isolation type: No active isolations     Activity level - Baseline/Home:  Independent  Activity Level - Current:   Independent    Bariatric equipment needed?: No    In the ED these meds were given:   Medications   ertapenem (INVanz) 1 g vial to attach to  mL bag (1 g Intravenous New Bag 11/4/18 1447)   vancomycin (VANCOCIN) 2,000 mg in sodium chloride 0.9 % 500 mL intermittent infusion (not administered)   vancomycin (VANCOCIN) 1,500 mg in sodium chloride 0.9 % 250 mL intermittent infusion (not administered)   0.9% sodium chloride BOLUS (0 mLs Intravenous Stopped 11/4/18 1419)   ondansetron (ZOFRAN) injection 4 mg (4 mg Intravenous Given 11/4/18 1216)   morphine (PF) injection 4 mg (4 mg Intravenous Given 11/4/18 1215)   gadobutrol (GADAVIST) injection 7.94 mL (7.9 mLs Intravenous Given 11/4/18 1304)       Drips running?  No    Home pump  No    Current LDAs  Peripheral IV 11/04/18 Anterior;Right Hand (Active)   Site Assessment WDL 11/4/2018  1:13 PM   Line Status Saline locked;Infusing 11/4/2018  1:13 PM   Phlebitis Scale 0-->no symptoms 11/4/2018  1:13 PM   Infiltration Scale 0 11/4/2018  1:13 PM   Extravasation? No 11/4/2018  1:13 PM   Dressing Intervention New dressing  11/4/2018  1:13 PM   Number of days:0       Labs results:   Labs Ordered and Resulted from Time of ED Arrival Up to the Time of Departure from the ED   CBC WITH PLATELETS DIFFERENTIAL - Abnormal; Notable for the following:        Result Value    RBC Count 4.39 (*)     Hemoglobin 12.5 (*)     MCHC 30.9 (*)     RDW 15.3 (*)     Absolute Monocytes 1.7 (*)     All other components within normal limits   BASIC METABOLIC PANEL - Abnormal; Notable for the following:     Potassium 3.3 (*)     Calcium 8.3 (*)     All other components within normal limits   CRP INFLAMMATION - Abnormal; Notable for the following:     CRP Inflammation 110.0 (*)     All other components within normal limits   LACTIC ACID WHOLE BLOOD    ERYTHROCYTE SEDIMENTATION RATE AUTO   ROUTINE UA WITH MICROSCOPIC   BLOOD CULTURE   BLOOD CULTURE       Imaging Studies:   Recent Results (from the past 24 hour(s))   MR Lumbar Spine w/o & w Contrast    Narrative    MR LUMBAR SPINE W/O & W CONTRAST 11/4/2018 1:46 PM    Provided History: lumbar back pain radiating to left hip, fevers,  immune suppressed, rule out osteomyelitis versus abscess; .    Comparison: Abdominal MRI 7/13/2015    Technique: Sagittal T1-weighted and T2-weighted and axial T2-weighted  images of the lumbar spine were obtained without intravenous contrast.  Post intravenous contrast using gadolinium axial and sagittal  T1-weighted images were obtained with fat saturation.    Contrast: 7.9CC GADAVIST     Findings:   5 lumbar-type vertebrae counting down from the presumed 12th ribs. The  tip the conus medullaris is at L1-2. Cauda equina nerve roots and  visualized thoracic cord are normal.    Asymmetric left facet joint effusions and interspinous edema with 2 mm  dorsal epidural bursal cyst formation. There is abnormal enhancing  edema within the paraspinous musculature bilaterally at L2-pelvis. No  discrete drainable fluid collection. Abnormal epidural  thickening/enhancement L4-S1. No abnormal intrathecal enhancement.    Normal alignment of the lumbar vertebrae. Normal lumbar lordosis. Disc  degeneration at L1-2 with loss of disc height and intradiscal T2  signal. Severe asymmetric right psoas muscular atrophy. Multiple cm  nonenhancing well-circumscribed heterogeneous hemorrhagic signal focus  in the right perivertebral space in the expected location of the right  psoas muscle at the level of L5 measuring up to 2.7 cm in maximal  dimension, unchanged dating back to at least 7/13/2015, probable  chronic organized hematoma.     Findings on a level by level basis are as follows:    T12-L1: No spinal canal or neural foraminal stenosis.    L1-2: Disc bulge. Mild spinal canal stenosis. No significant  neural  foraminal stenosis.    L2-3: No spinal canal or neural foraminal stenosis.    L3-4: Disc bulge. Mild bilateral neural foraminal stenosis. No  significant spinal canal stenosis.    L4-5: Disc bulge and facet arthrosis. Moderate bilateral neural  foraminal stenosis. No significant spinal canal stenosis.    L5-S1: Facet arthrosis. No spinal canal or neural foraminal stenosis.    Atrophic kidneys.      Impression    Impression:  1. Findings suspicious for infected L4-5 interspinous bursal cyst with  adjacent reactive epidural thickening and paraspinous cellulitis. No  drainable fluid collection.  2. Multilevel lumbar spondylosis. Moderate bilateral neural foraminal  stenosis at L4-5. Mild spinal canal stenosis at L1-2.  3. Multiple well-circumscribed hemorrhagic foci in the right  perivertebral soft tissues at L5 in the location of a severely  atrophied right psoas muscle, unchanged dating back to at least  7/13/2015, suspected chronic organized hematoma.    [Result: Infected interspinous bursal cyst]    Finding was identified on 11/4/2018 1:37 PM.     Dr. Han was contacted by Dr. Carpenter at 11/4/2018 1:56 PM and  verbalized understanding of the urgent finding.     ARVIN CARPENTER MD       Recent vital signs:   /74  Temp 99.6  F (37.6  C) (Oral)  Resp 16  Wt 79.4 kg (175 lb)  SpO2 96%  BMI 26.61 kg/m2    Cardiac Rhythm: Normal Sinus  Pt needs tele? No  Skin/wound Issues: None    Code Status: Full Code    Pain control: fair    Nausea control: good    Abnormal labs/tests/findings requiring intervention: MRI    Family present during ED course? Yes   Family Comments/Social Situation comments: Accompanied by wife.     Tasks needing completion: None    Gisele Galo, RN  [phone] [facility]

## 2018-11-04 NOTE — ED PROVIDER NOTES
History     Chief Complaint   Patient presents with     Back Pain     HPI  Jerad Ross is a 58 year old male with a history of hypertension, CAD, NSTEMI, status post kidney transplant (1993), avascular necrosis of bilateral hips status post replacements (1981 with revisions in 2001 and 2005), chronic hepatitis B, basal and squamous cell carcinoma, and depression, who presents to the Emergency Department for evaluation of worsening back pain for the past two days. Patient complains of middle lumbar pain radiating to the bilateral hips, greater on the left than the right. Patient reports that pain is present at rest and when his is supine, however, it is exacerbated with movement or when standing. He notes pain is somewhat more tolerable when he is on his side. Patient also notes that he had a elevated temperature of 100.6F yesterday evening. He denies any recent injuries or falls. He has never had back pain of this nature. Patient denies any numbness, paraesthesias or radiating sciatic pain. He denies any loss of bowel or bladder control. He denies any urinary symptoms, diarrhea, hematochezia, testicular pain, testicular swelling, cold-like symptoms or cough. He denies any previous back surgeries or injections. He has not had any recent chiropractic manipulations. Patient is not anticoagulated. He is immunosuppressed on daily prednisone and mycophenolate, and states that he has not had any recent changes with these.        I have reviewed the Medications, Allergies, Past Medical and Surgical History, and Social History in the [facility] system.    PAST MEDICAL HISTORY:   Past Medical History:   Diagnosis Date     AION (acute ischemic optic neuropathy)      Anemia in chronic renal disease      Avascular necrosis of bones of both hips (H)     s/p bilateral hip replacements     Basal cell carcinoma      Chronic hepatitis B (H)      Clostridium difficile colitis      Coronary artery disease involving native coronary  artery of native heart without angina pectoris 6/17/2014    Coronary angiogram 6/17/14: Severe distal 3-vessel disease involving small vessels, not amenable to PCI or CABG.     CRP elevated      Depression      Diverticulosis      Dyslipidemia      FSGS (focal segmental glomerulosclerosis)      Gastric ulcer with hemorrhage 2/12/12     GERD (gastroesophageal reflux disease)      Glaucoma     OHTN     Hemorrhoids      Hypertension secondary to other renal disorders      Hypogonadism in male      Immunosuppressed status (H)      Kidney replaced by transplant     focal glomerulosclerosis      NSTEMI (non-ST elevated myocardial infarction) (H) 6/17/2014     JULIANE (obstructive sleep apnea)     Doesn't use CPAP     Paracentral scotoma     LE     Secondary renal hyperparathyroidism (H)      Squamous cell carcinoma        PAST SURGICAL HISTORY:   Past Surgical History:   Procedure Laterality Date     APPENDECTOMY       CATARACT IOL, RT/LT  4/19/2000    RE     CATARACT IOL, RT/LT  6/1/2000    LE     COLECTOMY SUBTOTAL  1983    10 cm, diverticulitis     COLONOSCOPY  2/13/2012    Procedure:COLONOSCOPY; Surgeon:SLOAN GALLARDO; Location:[facility]     ESOPHAGOSCOPY, GASTROSCOPY, DUODENOSCOPY (EGD), COMBINED  2/13/2012    Procedure:COMBINED ESOPHAGOSCOPY, GASTROSCOPY, DUODENOSCOPY (EGD); Surgeon:SLOAN GALLARDO; Location:[facility]     EXTRACAPSULAR CATARACT EXTRATION WITH INTRAOCULAR LENS IMPLANT  4-20-10, 6-1-10    Rt, Lt     HERNIA REPAIR  1995    Lt inguinal     HIP SURGERY      1981, bilat MITZI, revised 2001, 2005     KIDNEY SURGERY  1978 and 1993    transplant     KNEE SURGERY  1983, 1987    bilat TKA     MOHS MICROGRAPHIC PROCEDURE       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, auxiliary spleen     TONSILLECTOMY         FAMILY HISTORY:   Family History   Problem Relation Age of Onset     Cardiovascular Father      AI with valve repair     Hypertension Father      KIDNEY DISEASE Father      Cancer Paternal Grandmother       ovarian ca     Cerebrovascular Disease Paternal Grandfather      Cerebrovascular Disease Maternal Grandfather      KIDNEY DISEASE Paternal Aunt      Skin Cancer No family hx of      Glaucoma No family hx of      Macular Degeneration No family hx of      Amblyopia No family hx of        SOCIAL HISTORY:   Social History   Substance Use Topics     Smoking status: Former Smoker     Packs/day: 1.00     Years: 9.00     Quit date: 1/1/1988     Smokeless tobacco: Former User     Alcohol use 0.0 oz/week     0 Standard drinks or equivalent per week      Comment: rarely 1 drink per month     Current Facility-Administered Medications   Medication     0.9% sodium chloride BOLUS     morphine (PF) injection 4 mg     ondansetron (ZOFRAN) injection 4 mg     Current Outpatient Prescriptions   Medication     amLODIPine (NORVASC) 5 MG tablet     aspirin 81 MG tablet     atorvastatin (LIPITOR) 20 MG tablet     clopidogrel (PLAVIX) 75 MG tablet     entecavir (BARACLUDE) 0.5 MG tablet     FLUoxetine (PROZAC) 40 MG capsule     isosorbide mononitrate (IMDUR) 30 MG 24 hr tablet     losartan (COZAAR) 50 MG tablet     mycophenolate (GENERIC EQUIVALENT) 250 MG capsule     predniSONE (DELTASONE) 5 MG tablet     valACYclovir (VALTREX) 1000 mg tablet     acetaminophen (TYLENOL) 325 MG tablet     albuterol (PROAIR HFA) 108 (90 Base) MCG/ACT Inhaler     BETA BLOCKER NOT PRESCRIBED, INTENTIONAL,     BUDESONIDE, INHALATION, 90 MCG/ACT AEPB     calcium citrate-vitamin D (CITRACAL) 315-200 MG-UNIT TABS     fluticasone (FLONASE) 50 MCG/ACT spray     fluticasone-salmeterol (ADVAIR) 250-50 MCG/DOSE diskus inhaler     ibuprofen (ADVIL,MOTRIN) 200 MG tablet     latanoprost (XALATAN) 0.005 % ophthalmic solution     montelukast (SINGULAIR) 10 MG tablet     montelukast (SINGULAIR) 10 MG tablet     Multiple Vitamins-Iron (MULTIVITAMIN/IRON PO)     Omega-3 Fatty Acids (OMEGA 3 PO)     omeprazole (PRILOSEC OTC) 20 MG tablet        Allergies   Allergen  Reactions     Penicillins Shortness Of Breath and Hives     Keflex [Cephalexin Hcl] Other (See Comments)     Pt could not recall reaction     Tetracycline Other (See Comments)     Patient could not recall reaction     Sulfa Drugs Rash       Review of Systems   Constitutional: Positive for fever.   HENT: Negative for rhinorrhea and sore throat.    Respiratory: Negative for cough.    Gastrointestinal: Negative for blood in stool and diarrhea.   Genitourinary: Negative for dysuria, frequency, hematuria, penile swelling, scrotal swelling and testicular pain.   Musculoskeletal: Positive for back pain (midline lumbar with radiation to the hips (L>R)).   Allergic/Immunologic: Positive for immunocompromised state.   Neurological: Negative for weakness and numbness.   All other systems reviewed and are negative.      Physical Exam   BP: 113/75  Heart Rate: 70  Temp: 99.6  F (37.6  C)  Resp: 16  Weight: 79.4 kg (175 lb)  SpO2: 97 %      Physical Exam  General: patient is alert and oriented, uncomfortable appearing  Head: atraumatic and normocephalic   EENT: moist mucus membranes without tonsillar erythema or exudates, sclera anicteric  Neck: supple   Cardiovascular: regular rate and rhythm, extremities warm and well perfused, no lower extremity edema, 2+ DP pulses bilaterally  Pulmonary: lungs clear to auscultation bilaterally   Abdomen: soft, slight tenderness at the left groin without any guarding otherwise benign to palpation, no CVA tenderness  Musculoskeletal: normal range of motion of the lower extremities with full range of motion of the left hip and knee, no midline thoracic or lumbar midline or paraspinal TTP  Neurological: alert and oriented, moving all extremities symmetrically, strength 5/5 and symmetric in  knee flexion/extension and ankle plantar/dorsiflexion, 4/5 in hip flexion on the left, 5/5 on the right, sensation to light touch in distal upper and lower extremities intact  Skin: warm, dry, no vesicular  lesions    ED Course     ED Course     Procedures             Critical Care time:  none             Labs Ordered and Resulted from Time of ED Arrival Up to the Time of Departure from the ED - No data to display         Assessments & Plan (with Medical Decision Making)   Mr. Ross is a 58-year-old male with a history of kidney transplant patient in 1993 and chronic immune suppression, asthma, and NSTEMI, chronic hepatitis B and arthritis, who presents to the Emergency Department with back pain and left hip pain. He reports pain in his lower midline lumbar spine radiating to the hips bilaterally and more significantly towards the left hip. On exam he is hemodynamically stable. He has a temp of 99.6 in the Emergency Department. He did note a temp of 100.6 at home yesterday evening. He is neurologically intact on exam without signs of acute cord compression, he denies any recent traumatic injury or fall. However he is immune suppressed with both mycophenolate and daily prednisone, and given his low-grade fevers concern for intra-abdominal infection versus osteomyelitis versus epidural abscess. His abdomen is quite benign on exam with some mild tenderness in the left groin, but otherwise unremarkable. Lower suspicion for diverticulitis or infection involving the renal transplant. He does not have any tenderness over his transplant site. Labs are obtained including CBC, BMP, CRP, ESR, lactate and UA.  He has no leukocytosis and lactate is within normal limits however CRP is elevated to 110. Patient was given a small fluid bolus in addition to antiemetics and analgesics.  MRI of the lumbar spine is obtained to evaluate for evidence of abscess versus osteomyelitis.  Imaging shows an L4-L5 infected interspinous bursal cyst without cord compression.  Discussed with neurosurgery and consultation pending.  Patient was started on vancomycin and ertapenem.  Will plan to admit for continued IV antibiotics.    I have reviewed the  nursing notes.    I have reviewed the findings, diagnosis, plan and need for follow up with the patient.    New Prescriptions    No medications on file       Final diagnoses:   Infection of lumbar spine (H)     I, Keke Boucher, am serving as a trained medical scribe to document services personally performed by Genia Matthews MD, based on the provider's statements to me.   I, Genia Matthews MD, was physically present and have reviewed and verified the accuracy of this note documented by Keke Boucher.    11/4/2018   [facility], [place], EMERGENCY DEPARTMENT     Genia Matthews MD  11/04/18 6011

## 2018-11-04 NOTE — IP AVS SNAPSHOT
MRN:[id number]                      After Visit Summary   11/4/2018    Jerad Ross    MRN: [id number]           Thank you!     Thank you for choosing [facility] for your care. Our goal is always to provide you with excellent care. Hearing back from our patients is one way we can continue to improve our services. Please take a few minutes to complete the written survey that you may receive in the mail after you visit with us. Thank you!        Patient Information     Date Of Birth          1960        Designated Caregiver       Most Recent Value    Caregiver    Will someone help with your care after discharge? yes    Name of designated caregiver Jennifer Ross    Phone number of caregiver [phone]    Caregiver address [place], [place]      About your hospital stay     You were admitted on:  November 4, 2018 You last received care in the:  [address] [place] [address]    You were discharged on:  November 10, 2018        Reason for your hospital stay       You were hospitalized for infected bursa in your back.  You were treated with IV antibiotics and improved clinically.  You were improving and stable at the time of discharge                  Who to Call     For medical emergencies, please call 911.  For non-urgent questions about your medical care, please call your primary care provider or clinic, [phone]          Attending Provider     Provider Specialty    Genia Matthews MD Emergency Medicine Emergency Medical Services    Torsten Villanueva MD Family Practice       Primary Care Provider Office Phone # Fax #    Aston Perry [phone]      After Care Instructions     Activity - Up ad muriel           Advance Diet as Tolerated       Follow this diet upon discharge: Orders Placed This Encounter      Regular Diet Adult            General info for SNF       Length of Stay Estimate: Short Term Care: Estimated # of Days <30    Condition at Discharge:  Improving  Level of care:skilled   Rehabilitation Potential: Good  Admission H&P remains valid and up-to-date: Yes  Recent Chemotherapy: N/A  Use Nursing Home Standing Orders: N/A            Mantoux instructions       Give two-step Mantoux (PPD) Per Facility Policy Yes                  Follow-up Appointments     Follow Up and recommended labs and tests       Follow up with correction physician.  The following labs/tests are recommended: Bi-Weekly CRP and CBC, Follow up in 2 weeks with primary care provider                  Your next 10 appointments already scheduled     Dec 11, 2018  2:05 PM CST   (Arrive by 1:35 PM)   Return Kidney Transplant with  Kidney/Pancreas Recipient 1   [facility] ([facility])    [address]  [address]  [facility] [address]   [phone]            Dec 13, 2018 10:30 AM CST   (Arrive by 10:15 AM)   Return Visit with Sisi Lockwood MD   [facility] ([facility])    [address]  [address]  [facility] [address]   [phone]            Dec 13, 2018 12:30 PM CST   Adult Med Follow UP with RONALDO Dempsey CNP   Psychiatry Clinic ([facility])    [address]  [address] [address] [phone]            Feb 04, 2019  9:45 AM CST   RETURN GLAUCOMA with Viki Rizzo MD   Eye Clinic ([facility])    [address]  [address] [address]  [facility] [address]   [phone]              Pending Results     Date and Time Order Name Status Description    11/7/2018 0954 Fluid Culture Aerobic Bacterial Preliminary             Statement of Approval     Ordered          11/10/18 1217  I have reviewed and agree with all the recommendations and orders detailed in this document.  EFFECTIVE NOW     Approved and electronically signed by:  Adi Shin MD              Admission Information     Date & Time Provider Department Dept. Phone    11/4/2018 Torsten Villanueva MD [address] [facility] [phone]      Your Vitals Were     Blood Pressure Pulse Temperature Respirations Weight Pulse Oximetry    124/84 58 97.1  F (36.2  C) (Oral) 20 82.5 kg (181 lb 12.8 oz) 99%    BMI (Body Mass Index)                   27.64 kg/m2           MyChart Information     [facility] gives you secure access to your electronic health record. If you see a primary care provider, you can also send messages to your care team and make appointments. If you have questions, please call your primary care clinic.  If you do not have a primary care provider, please call [phone] and they will assist you.        Care EveryWhere ID     This is your Care EveryWhere ID. This could be used by other organizations to access your [facility] medical records  [id number]        Equal Access to Services     CARLOS MENDOZA : Rosa Alarcon, josefina thakkar, ervin carreon, nish gates . So [facility] [phone].    ATENCIÓN: Si habla español, tiene a sanchez disposición servicios gratuitos de asistencia lingüística. Llame al [phone].    We comply with applicable federal civil rights laws and [place] laws. We do not discriminate on the basis of race, color, national origin, age, disability, sex, sexual orientation, or gender identity.               Review of your medicines      START taking        Dose / Directions    cefTRIAXone 2 GM vial   Commonly known as:  ROCEPHIN   Indication:  intraspinous septic bursitis        Dose:  2 g   Inject 2 g into the vein every 24 hours   Quantity:  10 each   Refills:  0       docusate sodium 100 MG capsule   Commonly known as:  COLACE        Dose:  100 mg   Take 1 capsule (100 mg) by mouth 2 times daily   Quantity:  60 capsule   Refills:  0       heparin lock flush 10 UNIT/ML Soln injection        Dose:  2-5 mL   2-5 mLs by Intracatheter  route once as needed for line flush (for locking each dormant lumen with line placement)   Refills:  0       oxyCODONE IR 5 MG tablet   Commonly known as:  ROXICODONE        Dose:  5-10 mg   Take 1-2 tablets (5-10 mg) by mouth every 6 hours as needed for moderate to severe pain   Quantity:  19 tablet   Refills:  0       polyethylene glycol Packet   Commonly known as:  MIRALAX/GLYCOLAX        Dose:  17 g   Start taking on:  11/11/2018   Take 17 g by mouth daily   Quantity:  7 packet   Refills:  0         CONTINUE these medicines which have NOT CHANGED        Dose / Directions    albuterol 108 (90 Base) MCG/ACT inhaler   Commonly known as:  PROAIR HFA   Used for:  Cough, Wheezing        Dose:  2 puff   Inhale 2 puffs into the lungs every 6 hours as needed for shortness of breath / dyspnea or wheezing   Quantity:  1 Inhaler   Refills:  2       amLODIPine 5 MG tablet   Commonly known as:  NORVASC   Used for:  Acute chest pain        Dose:  5 mg   Take 1 tablet (5 mg) by mouth daily   Quantity:  90 tablet   Refills:  1       aspirin 81 MG tablet        Dose:  81 mg   Take 81 mg by mouth daily   Refills:  0       atorvastatin 20 MG tablet   Commonly known as:  LIPITOR   Used for:  NSTEMI (non-ST elevated myocardial infarction) (H)        Dose:  20 mg   Take 1 tablet (20 mg) by mouth daily   Quantity:  90 tablet   Refills:  1       BETA BLOCKER NOT PRESCRIBED (INTENTIONAL)   Used for:  NSTEMI (non-ST elevated myocardial infarction) (H)        Beta Blocker not prescribed intentionally due to Bradycardia < 50 bpm without beta blocker therapy   Refills:  0       BUDESONIDE (INHALATION) 90 MCG/ACT Aepb   Used for:  Asthma        Dose:  2 puff   Inhale 2 puffs into the lungs 2 times daily   Quantity:  1 each   Refills:  3       calcium citrate-vitamin D 315-200 MG-UNIT Tabs per tablet   Commonly known as:  CITRACAL        Dose:  1 tablet   Take 1 tablet by mouth daily.   Refills:  0       CHOLECALCIFEROL PO        Dose:  1  tablet   Take 1 tablet by mouth daily Dose unknown   Refills:  0       clopidogrel 75 MG tablet   Commonly known as:  PLAVIX   Used for:  NSTEMI (non-ST elevated myocardial infarction) (H), Coronary artery disease involving native coronary artery of native heart without angina pectoris        Dose:  75 mg   Take 1 tablet (75 mg) by mouth daily   Quantity:  90 tablet   Refills:  1       entecavir 0.5 MG tablet   Commonly known as:  BARACLUDE   Used for:  Chronic viral hepatitis B without delta agent and without coma (H), Chronic hepatitis B (H)        Dose:  0.5 mg   Take 1 tablet (0.5 mg) by mouth daily   Quantity:  90 tablet   Refills:  3       FLUoxetine 40 MG capsule   Commonly known as:  PROzac   Used for:  Major depressive disorder, recurrent episode, moderate (H)        Dose:  40 mg   Take 1 capsule (40 mg) by mouth every morning   Quantity:  90 capsule   Refills:  1       fluticasone 50 MCG/ACT spray   Commonly known as:  FLONASE   Used for:  Bronchitis with bronchospasm        Dose:  1-2 spray   Spray 1-2 sprays into both nostrils daily   Quantity:  3 Bottle   Refills:  11       fluticasone-salmeterol 250-50 MCG/DOSE diskus inhaler   Commonly known as:  ADVAIR   Used for:  Cough        Dose:  1 puff   Inhale 1 puff into the lungs every 12 hours   Quantity:  60 Inhaler   Refills:  1       isosorbide mononitrate 30 MG 24 hr tablet   Commonly known as:  IMDUR   Used for:  Acute chest pain        Dose:  15 mg   Take 0.5 tablets (15 mg) by mouth daily   Quantity:  45 tablet   Refills:  1       latanoprost 0.005 % ophthalmic solution   Commonly known as:  XALATAN   Used for:  Borderline glaucoma with ocular hypertension, bilateral        Dose:  1 drop   Place 1 drop into both eyes At Bedtime   Quantity:  3 Bottle   Refills:  3       mycophenolate 250 MG capsule   Commonly known as:  GENERIC EQUIVALENT   Used for:  Kidney transplanted        Dose:  750 mg   Take 3 capsules (750 mg) by mouth 2 times daily    Quantity:  180 capsule   Refills:  11       OMEGA 3 PO        Dose:  1 capsule   Take 1 capsule by mouth daily Dose unknown   Refills:  0       omeprazole 20 MG tablet   Commonly known as:  priLOSEC OTC   Used for:  Chronic hepatitis B (H), HTN (hypertension), Depression, Unspecified essential hypertension        Take  by mouth daily.   Quantity:  30 tablet   Refills:  0       ONE DAILY MULTIVITAMIN/IRON Tabs        Dose:  1 tablet   Take 1 tablet by mouth daily   Refills:  0       predniSONE 5 MG tablet   Commonly known as:  DELTASONE   Used for:  Kidney transplanted        Dose:  5 mg   Take 1 tablet (5 mg) by mouth daily   Quantity:  90 tablet   Refills:  3       TYLENOL 325 MG tablet   Generic drug:  acetaminophen        Dose:  1-2 tablet   Take 1-2 tablets by mouth every 6 hours as needed.   Refills:  0            Where to get your medicines      Some of these will need a paper prescription and others can be bought over the counter. Ask your nurse if you have questions.     Bring a paper prescription for each of these medications     oxyCODONE IR 5 MG tablet       You don't need a prescription for these medications     cefTRIAXone 2 GM vial    docusate sodium 100 MG capsule    heparin lock flush 10 UNIT/ML Soln injection    polyethylene glycol Packet                Protect others around you: Learn how to safely use, store and throw away your medicines at www.disposemymeds.org.        ANTIBIOTIC INSTRUCTION     You've Been Prescribed an Antibiotic - Now What?  Your healthcare team thinks that you or your loved one might have an infection. Some infections can be treated with antibiotics, which are powerful, life-saving drugs. Like all medications, antibiotics have side effects and should only be used when necessary. There are some important things you should know about your antibiotic treatment.      Your healthcare team may run tests before you start taking an antibiotic.    Your team may take samples (e.g.,  from your blood, urine or other areas) to run tests to look for bacteria. These test can be important to determine if you need an antibiotic at all and, if you do, which antibiotic will work best.      Within a few days, your healthcare team might change or even stop your antibiotic.    Your team may start you on an antibiotic while they are working to find out what is making you sick.    Your team might change your antibiotic because test results show that a different antibiotic would be better to treat your infection.    In some cases, once your team has more information, they learn that you do not need an antibiotic at all. They may find out that you don't have an infection, or that the antibiotic you're taking won't work against your infection. For example, an infection caused by a virus can't be treated with antibiotics. Staying on an antibiotic when you don't need it is more likely to be harmful than helpful.      You may experience side effects from your antibiotic.    Like all medications, antibiotics have side effects. Some of these can be serious.    Let you healthcare team know if you have any known allergies when you are admitted to the hospital.    One significant side effect of nearly all antibiotics is the risk of severe and sometimes deadly diarrhea caused by Clostridium difficile (C. Difficile). This occurs when a person takes antibiotics because some good germs are destroyed. Antibiotic use allows C. diificile to take over, putting patients at high risk for this serious infection.    As a patient or caregiver, it is important to understand your or your loved one's antibiotic treatment. It is especially important for caregivers to speak up when patients can't speak for themselves. Here are some important questions to ask your healthcare team.    What infection is this antibiotic treating and how do you know I have that infection?    What side effects might occur from this antibiotic?    How long  will I need to take this antibiotic?    Is it safe to take this antibiotic with other medications or supplements (e.g., vitamins) that I am taking?     Are there any special directions I need to know about taking this antibiotic? For example, should I take it with food?    How will I be monitored to know whether my infection is responding to the antibiotic?    What tests may help to make sure the right antibiotic is prescribed for me?      Information provided by:  www.cdc.gov/getsmart  U.S. Department of Health and Human Services  Centers for disease Control and Prevention  National Center for Emerging and Zoonotic Infectious Diseases  Division of Healthcare Quality Promotion        Information about OPIOIDS     PRESCRIPTION OPIOIDS: WHAT YOU NEED TO KNOW   We gave you an opioid (narcotic) pain medicine. It is important to manage your pain, but opioids are not always the best choice. You should first try all the other options your care team gave you. Take this medicine for as short a time (and as few doses) as possible.    Some activities can increase your pain, such as bandage changes or therapy sessions. It may help to take your pain medicine 30 to 60 minutes before these activities. Reduce your stress by getting enough sleep, working on hobbies you enjoy and practicing relaxation or meditation. Talk to your care team about ways to manage your pain beyond prescription opioids.    These medicines have risks:    DO NOT drive when on new or higher doses of pain medicine. These medicines can affect your alertness and reaction times, and you could be arrested for driving under the influence (DUI). If you need to use opioids long-term, talk to your care team about driving.    DO NOT operate heavy machinery    DO NOT do any other dangerous activities while taking these medicines.    DO NOT drink any alcohol while taking these medicines.     If the opioid prescribed includes acetaminophen, DO NOT take with any other  medicines that contain acetaminophen. Read all labels carefully. Look for the word  acetaminophen  or  Tylenol.  Ask your pharmacist if you have questions or are unsure.    You can get addicted to pain medicines, especially if you have a history of addiction (chemical, alcohol or substance dependence). Talk to your care team about ways to reduce this risk.    All opioids tend to cause constipation. Drink plenty of water and eat foods that have a lot of fiber, such as fruits, vegetables, prune juice, apple juice and high-fiber cereal. Take a laxative (Miralax, milk of magnesia, Colace, Senna) if you don t move your bowels at least every other day. Other side effects include upset stomach, sleepiness, dizziness, throwing up, tolerance (needing more of the medicine to have the same effect), physical dependence and slowed breathing.    Store your pills in a secure place, locked if possible. We will not replace any lost or stolen medicine. If you don t finish your medicine, please throw away (dispose) as directed by your pharmacist. The [place] Pollution Control Agency has more information about safe disposal: https://www.[employer].[place].us/living-green/managing-unwanted-medications             Medication List: This is a list of all your medications and when to take them. Check marks below indicate your daily home schedule. Keep this list as a reference.      Medications           Morning Afternoon Evening Bedtime As Needed    albuterol 108 (90 Base) MCG/ACT inhaler   Commonly known as:  PROAIR HFA   Inhale 2 puffs into the lungs every 6 hours as needed for shortness of breath / dyspnea or wheezing                                amLODIPine 5 MG tablet   Commonly known as:  NORVASC   Take 1 tablet (5 mg) by mouth daily   Last time this was given:  5 mg on 11/10/2018  7:58 AM                                aspirin 81 MG tablet   Take 81 mg by mouth daily                                atorvastatin 20 MG tablet   Commonly  known as:  LIPITOR   Take 1 tablet (20 mg) by mouth daily   Last time this was given:  20 mg on 11/10/2018  7:58 AM                                BETA BLOCKER NOT PRESCRIBED (INTENTIONAL)   Beta Blocker not prescribed intentionally due to Bradycardia < 50 bpm without beta blocker therapy                                BUDESONIDE (INHALATION) 90 MCG/ACT Aepb   Inhale 2 puffs into the lungs 2 times daily                                calcium citrate-vitamin D 315-200 MG-UNIT Tabs per tablet   Commonly known as:  CITRACAL   Take 1 tablet by mouth daily.                                cefTRIAXone 2 GM vial   Commonly known as:  ROCEPHIN   Inject 2 g into the vein every 24 hours   Last time this was given:  2 g on 11/10/2018 11:31 AM                                CHOLECALCIFEROL PO   Take 1 tablet by mouth daily Dose unknown                                clopidogrel 75 MG tablet   Commonly known as:  PLAVIX   Take 1 tablet (75 mg) by mouth daily                                docusate sodium 100 MG capsule   Commonly known as:  COLACE   Take 1 capsule (100 mg) by mouth 2 times daily   Last time this was given:  100 mg on 11/10/2018  7:58 AM                                entecavir 0.5 MG tablet   Commonly known as:  BARACLUDE   Take 1 tablet (0.5 mg) by mouth daily   Last time this was given:  0.5 mg on 11/10/2018  8:05 AM                                FLUoxetine 40 MG capsule   Commonly known as:  PROzac   Take 1 capsule (40 mg) by mouth every morning   Last time this was given:  40 mg on 11/10/2018  7:58 AM                                fluticasone 50 MCG/ACT spray   Commonly known as:  FLONASE   Spray 1-2 sprays into both nostrils daily                                fluticasone-salmeterol 250-50 MCG/DOSE diskus inhaler   Commonly known as:  ADVAIR   Inhale 1 puff into the lungs every 12 hours                                heparin lock flush 10 UNIT/ML Soln injection   2-5 mLs by Intracatheter route once as  needed for line flush (for locking each dormant lumen with line placement)                                isosorbide mononitrate 30 MG 24 hr tablet   Commonly known as:  IMDUR   Take 0.5 tablets (15 mg) by mouth daily   Last time this was given:  15 mg on 11/10/2018  8:05 AM                                latanoprost 0.005 % ophthalmic solution   Commonly known as:  XALATAN   Place 1 drop into both eyes At Bedtime   Last time this was given:  1 drop on 11/9/2018  7:54 PM                                mycophenolate 250 MG capsule   Commonly known as:  GENERIC EQUIVALENT   Take 3 capsules (750 mg) by mouth 2 times daily   Last time this was given:  750 mg on 11/10/2018  7:58 AM                                OMEGA 3 PO   Take 1 capsule by mouth daily Dose unknown                                omeprazole 20 MG tablet   Commonly known as:  priLOSEC OTC   Take  by mouth daily.                                ONE DAILY MULTIVITAMIN/IRON Tabs   Take 1 tablet by mouth daily                                oxyCODONE IR 5 MG tablet   Commonly known as:  ROXICODONE   Take 1-2 tablets (5-10 mg) by mouth every 6 hours as needed for moderate to severe pain   Last time this was given:  5 mg on 11/10/2018  7:58 AM                                polyethylene glycol Packet   Commonly known as:  MIRALAX/GLYCOLAX   Take 17 g by mouth daily   Start taking on:  11/11/2018   Last time this was given:  17 g on 11/9/2018  8:34 AM                                predniSONE 5 MG tablet   Commonly known as:  DELTASONE   Take 1 tablet (5 mg) by mouth daily   Last time this was given:  5 mg on 11/10/2018  7:58 AM                                TYLENOL 325 MG tablet   Take 1-2 tablets by mouth every 6 hours as needed.   Last time this was given:  650 mg on 11/10/2018  7:58 AM   Generic drug:  acetaminophen

## 2018-11-04 NOTE — IP AVS SNAPSHOT
` ` Patient Information     Patient Name Sex     Jerad Ross ([id number]) Male 1960       Room Bed    7417 7417-02      Patient Demographics     Address Phone E-mail Address    [address]  [facility] [address] [phone] (Home)  [phone] (Mobile) *Preferred* [email or web address]      Patient Ethnicity & Race     Ethnic Group Patient Race    Not  or  White      Emergency Contact(s)     Name Relation Home Work Mobile    Jennifer Ross Spouse [phone]      Documents on File        Status Date Received Description       Documents for the Patient    PRASANNA Face Sheet Received () 06/10/09     Insurance Card  06/10/09 MA    Privacy Notice - [place] Received 11     Waiver - Payment  06/10/09     External Medication Information Consent       Patient ID Received 12     Consent for Services - Hospital/Clinic Received () 11     Consent for Services - Hospital/Clinic Received () 11     Consent for Services - Hospital/Clinic Received () 11     Consent for Services - Hospital/Clinic  () 11 CONSENT FOR SERVICES - CLINIC AND HOD    Consent for Services - Hospital/Clinic  () 11 CONSENT FOR SERVICES - CLINIC AND HOD    Consent for Services - Hospital/Clinic  () 11 CONSENT FOR SERVICES - CLINIC AND HOD    Insurance Card Received 12 [facility] Authorization - File Only  12 MYCHART ACCESS, SIGNED 3/2/12    Consent for EHR Access  13 Copied from existing Consent for services - C/HOD collected on 2011    [facility] Specified Other       Consent for EHR Access Received 13     Consent for Services - Hospital/Clinic Received () 13     Consent for Services - [facility] Received 13 imaging center    Consent for Services - [facility]  10/11/13 GENERAL CONSENT FORM: SHARED EHR - ENGLISH    Consent for Services - Hospital/Clinic Received  () 06/10/14     Consent for Services - Hospital/Clinic  () 14 CONSENT FOR SERVICE    Business/Insurance/Care Coordination/Health Form - Patient  14 RIKA, PRESCRIPTION REFILL REQUEST, 2014--04/10/2014    Insurance Card Received 10/03/14 MEDICARE    HIM JAZMINE Authorization  02/02/15 [facility]    Business/Insurance/Care Coordination/Health Form - Patient  03/28/15 MEDICAREBLUE, PRIOR AUTHORIZATION APPROVED- [facility], 14    Consent for Services - Hospital/Clinic Received () 06/17/15     Consent for Services - Hospital/Clinic  () 06/19/15 CONSENT FOR SERVICE    Physical Therapy Certification Received 06/29/15 Eval Only    Consent for Services/Privacy Notice - Hospital/Clinic Received () 03/10/17     Consent for Services/Privacy Notice - Hospital/Clinic Received () 16     Consent for Services/Privacy Notice - Hospital/Clinic  () 16 CONSENT FOR SERVICE/PRIVACY NOTICE    Consent for Services/Privacy Notice - Hospital/Clinic-[facility]       Care Everywhere Prospective Auth Received 17     Business/Insurance/Care Coordination/Health Form - Patient  18 [facility] PAY RELIEF FORM 18    Consent for Services/Privacy Notice - Hospital/Clinic Received 18     Consent for Services - Hospital and Clinic Received 18     HIE Auth Received 18     Occupational Therapy Certification Received 18-10/11/2018    CE Prospective Auth Received () 16 Authorization Document from Social Security Administration    System-Retrieved CE Auth Form Received () 16 External Authorization Form from Social Security Administration       Documents for the Encounter    CMS IM for Patient Signature Received 18     Assessment/Questionnaire  18 MRI HISTORY QUESTIONNAIRE AND CONTRAST NOTICE      Admission Information     Attending Provider Admitting  Provider Admission Type Admission Date/Time    Torsten Villanueva MD Eide, Kristin, DO Elective 11/04/18  1058    Discharge Date Hospital Service Auth/Cert Status Service Area     Internal Medicine Incomplete [facility]    Unit Room/Bed Admission Status       UU U7 7417/7417-02 Admission (Confirmed)       Admission     Complaint    Infection of lumbar spine (H)      Hospital Account     Name Acct ID Class Status Primary Coverage    Jerad Ross [id number] Inpatient Open MEDICARE - MEDICARE            Guarantor Account (for Hospital Account #[id number])     Name Relation to Pt Service Area Active? Acct Type    Jerad Ross  FCS Yes Personal/Family    Address Phone          [address] [place] [address] [phone](H)              Coverage Information (for Hospital Account #[id number])     1. MEDICARE/MEDICARE     F/O Payor/Plan Precert #    MEDICARE/MEDICARE     Subscriber Subscriber #    Jerad Ross [id number]    Address Phone    [employer]  [address]  [place] [address] [phone]          2. [place]     F/O Payor/Plan Precert #    [place]     Subscriber Subscriber #    Jerad Ross [id number]    Address Phone    [address] [address]  [place] [address] [phone]

## 2018-11-05 ENCOUNTER — APPOINTMENT (OUTPATIENT)
Dept: OCCUPATIONAL THERAPY | Facility: CLINIC | Age: 58
DRG: 558 | End: 2018-11-05
Payer: MEDICARE

## 2018-11-05 ENCOUNTER — APPOINTMENT (OUTPATIENT)
Dept: PHYSICAL THERAPY | Facility: CLINIC | Age: 58
DRG: 558 | End: 2018-11-05
Payer: MEDICARE

## 2018-11-05 LAB
ANION GAP SERPL CALCULATED.3IONS-SCNC: 8 MMOL/L (ref 3–14)
BASOPHILS # BLD AUTO: 0 10E9/L (ref 0–0.2)
BASOPHILS NFR BLD AUTO: 0.1 %
BUN SERPL-MCNC: 11 MG/DL (ref 7–30)
CALCIUM SERPL-MCNC: 8.4 MG/DL (ref 8.5–10.1)
CHLORIDE SERPL-SCNC: 107 MMOL/L (ref 94–109)
CO2 SERPL-SCNC: 24 MMOL/L (ref 20–32)
CREAT SERPL-MCNC: 0.69 MG/DL (ref 0.66–1.25)
CRP SERPL-MCNC: 160 MG/L (ref 0–8)
DIFFERENTIAL METHOD BLD: ABNORMAL
EOSINOPHIL # BLD AUTO: 0.1 10E9/L (ref 0–0.7)
EOSINOPHIL NFR BLD AUTO: 1 %
ERYTHROCYTE [DISTWIDTH] IN BLOOD BY AUTOMATED COUNT: 15.4 % (ref 10–15)
ERYTHROCYTE [SEDIMENTATION RATE] IN BLOOD BY WESTERGREN METHOD: 48 MM/H (ref 0–20)
GFR SERPL CREATININE-BSD FRML MDRD: >90 ML/MIN/1.7M2
GLUCOSE SERPL-MCNC: 80 MG/DL (ref 70–99)
HCT VFR BLD AUTO: 38.4 % (ref 40–53)
HGB BLD-MCNC: 11.7 G/DL (ref 13.3–17.7)
IMM GRANULOCYTES # BLD: 0 10E9/L (ref 0–0.4)
IMM GRANULOCYTES NFR BLD: 0.2 %
LYMPHOCYTES # BLD AUTO: 1.5 10E9/L (ref 0.8–5.3)
LYMPHOCYTES NFR BLD AUTO: 15.8 %
MAGNESIUM SERPL-MCNC: 1.7 MG/DL (ref 1.6–2.3)
MCH RBC QN AUTO: 28 PG (ref 26.5–33)
MCHC RBC AUTO-ENTMCNC: 30.5 G/DL (ref 31.5–36.5)
MCV RBC AUTO: 92 FL (ref 78–100)
MONOCYTES # BLD AUTO: 1.1 10E9/L (ref 0–1.3)
MONOCYTES NFR BLD AUTO: 11.1 %
MRSA DNA SPEC QL NAA+PROBE: NEGATIVE
NEUTROPHILS # BLD AUTO: 6.9 10E9/L (ref 1.6–8.3)
NEUTROPHILS NFR BLD AUTO: 71.8 %
NRBC # BLD AUTO: 0 10*3/UL
NRBC BLD AUTO-RTO: 0 /100
PHOSPHATE SERPL-MCNC: 2.9 MG/DL (ref 2.5–4.5)
PLATELET # BLD AUTO: 307 10E9/L (ref 150–450)
POTASSIUM SERPL-SCNC: 3.6 MMOL/L (ref 3.4–5.3)
RBC # BLD AUTO: 4.18 10E12/L (ref 4.4–5.9)
SODIUM SERPL-SCNC: 139 MMOL/L (ref 133–144)
SPECIMEN SOURCE: NORMAL
WBC # BLD AUTO: 9.6 10E9/L (ref 4–11)

## 2018-11-05 PROCEDURE — 25000132 ZZH RX MED GY IP 250 OP 250 PS 637: Mod: GY | Performed by: FAMILY MEDICINE

## 2018-11-05 PROCEDURE — 25000132 ZZH RX MED GY IP 250 OP 250 PS 637: Mod: GY | Performed by: STUDENT IN AN ORGANIZED HEALTH CARE EDUCATION/TRAINING PROGRAM

## 2018-11-05 PROCEDURE — 87640 STAPH A DNA AMP PROBE: CPT | Performed by: FAMILY MEDICINE

## 2018-11-05 PROCEDURE — 85652 RBC SED RATE AUTOMATED: CPT | Performed by: FAMILY MEDICINE

## 2018-11-05 PROCEDURE — 12000008 ZZH R&B INTERMEDIATE UMMC

## 2018-11-05 PROCEDURE — 97165 OT EVAL LOW COMPLEX 30 MIN: CPT | Mod: GO

## 2018-11-05 PROCEDURE — 80048 BASIC METABOLIC PNL TOTAL CA: CPT | Performed by: FAMILY MEDICINE

## 2018-11-05 PROCEDURE — 25000128 H RX IP 250 OP 636: Performed by: FAMILY MEDICINE

## 2018-11-05 PROCEDURE — A9270 NON-COVERED ITEM OR SERVICE: HCPCS | Mod: GY | Performed by: FAMILY MEDICINE

## 2018-11-05 PROCEDURE — 97530 THERAPEUTIC ACTIVITIES: CPT | Mod: GO

## 2018-11-05 PROCEDURE — 87641 MR-STAPH DNA AMP PROBE: CPT | Performed by: FAMILY MEDICINE

## 2018-11-05 PROCEDURE — 97161 PT EVAL LOW COMPLEX 20 MIN: CPT | Mod: GP

## 2018-11-05 PROCEDURE — 36415 COLL VENOUS BLD VENIPUNCTURE: CPT | Performed by: FAMILY MEDICINE

## 2018-11-05 PROCEDURE — 97116 GAIT TRAINING THERAPY: CPT | Mod: GP

## 2018-11-05 PROCEDURE — 84100 ASSAY OF PHOSPHORUS: CPT | Performed by: FAMILY MEDICINE

## 2018-11-05 PROCEDURE — 25000125 ZZHC RX 250: Performed by: FAMILY MEDICINE

## 2018-11-05 PROCEDURE — 86140 C-REACTIVE PROTEIN: CPT | Performed by: FAMILY MEDICINE

## 2018-11-05 PROCEDURE — 40000193 ZZH STATISTIC PT WARD VISIT

## 2018-11-05 PROCEDURE — 97535 SELF CARE MNGMENT TRAINING: CPT | Mod: GO

## 2018-11-05 PROCEDURE — 85025 COMPLETE CBC W/AUTO DIFF WBC: CPT | Performed by: FAMILY MEDICINE

## 2018-11-05 PROCEDURE — 25000128 H RX IP 250 OP 636: Performed by: EMERGENCY MEDICINE

## 2018-11-05 PROCEDURE — 83735 ASSAY OF MAGNESIUM: CPT | Performed by: FAMILY MEDICINE

## 2018-11-05 PROCEDURE — 40000133 ZZH STATISTIC OT WARD VISIT

## 2018-11-05 PROCEDURE — 25000128 H RX IP 250 OP 636: Performed by: STUDENT IN AN ORGANIZED HEALTH CARE EDUCATION/TRAINING PROGRAM

## 2018-11-05 PROCEDURE — 25000131 ZZH RX MED GY IP 250 OP 636 PS 637: Mod: GY | Performed by: FAMILY MEDICINE

## 2018-11-05 PROCEDURE — A9270 NON-COVERED ITEM OR SERVICE: HCPCS | Mod: GY | Performed by: STUDENT IN AN ORGANIZED HEALTH CARE EDUCATION/TRAINING PROGRAM

## 2018-11-05 RX ORDER — DIPHENHYDRAMINE HCL 25 MG
25 CAPSULE ORAL EVERY 6 HOURS PRN
Status: DISCONTINUED | OUTPATIENT
Start: 2018-11-05 | End: 2018-11-10 | Stop reason: HOSPADM

## 2018-11-05 RX ORDER — POLYETHYLENE GLYCOL 3350 17 G/17G
17 POWDER, FOR SOLUTION ORAL DAILY
Status: DISCONTINUED | OUTPATIENT
Start: 2018-11-05 | End: 2018-11-10 | Stop reason: HOSPADM

## 2018-11-05 RX ADMIN — ASPIRIN 81 MG CHEWABLE TABLET 81 MG: 81 TABLET CHEWABLE at 08:39

## 2018-11-05 RX ADMIN — VANCOMYCIN HYDROCHLORIDE 1500 MG: 10 INJECTION, POWDER, LYOPHILIZED, FOR SOLUTION INTRAVENOUS at 04:09

## 2018-11-05 RX ADMIN — MYCOPHENOLATE MOFETIL 750 MG: 250 CAPSULE ORAL at 19:34

## 2018-11-05 RX ADMIN — ENOXAPARIN SODIUM 40 MG: 40 INJECTION SUBCUTANEOUS at 19:35

## 2018-11-05 RX ADMIN — ACETAMINOPHEN 650 MG: 325 TABLET, FILM COATED ORAL at 15:46

## 2018-11-05 RX ADMIN — ENTECAVIR 0.5 MG: 0.5 TABLET ORAL at 08:39

## 2018-11-05 RX ADMIN — OXYCODONE HYDROCHLORIDE 5 MG: 5 TABLET ORAL at 08:40

## 2018-11-05 RX ADMIN — AMLODIPINE BESYLATE 5 MG: 5 TABLET ORAL at 08:39

## 2018-11-05 RX ADMIN — OMEPRAZOLE 20 MG: 20 CAPSULE, DELAYED RELEASE ORAL at 08:39

## 2018-11-05 RX ADMIN — OXYCODONE HYDROCHLORIDE 5 MG: 5 TABLET ORAL at 14:14

## 2018-11-05 RX ADMIN — DOCUSATE SODIUM 100 MG: 100 CAPSULE, LIQUID FILLED ORAL at 08:39

## 2018-11-05 RX ADMIN — DIPHENHYDRAMINE HYDROCHLORIDE 25 MG: 25 CAPSULE ORAL at 18:49

## 2018-11-05 RX ADMIN — LATANOPROST 1 DROP: 50 SOLUTION OPHTHALMIC at 19:38

## 2018-11-05 RX ADMIN — MYCOPHENOLATE MOFETIL 750 MG: 250 CAPSULE ORAL at 08:39

## 2018-11-05 RX ADMIN — OXYCODONE HYDROCHLORIDE 5 MG: 5 TABLET ORAL at 05:27

## 2018-11-05 RX ADMIN — Medication 15 MG: at 08:39

## 2018-11-05 RX ADMIN — Medication 0.2 MG: at 04:05

## 2018-11-05 RX ADMIN — ERTAPENEM SODIUM 1 G: 1 INJECTION, POWDER, LYOPHILIZED, FOR SOLUTION INTRAMUSCULAR; INTRAVENOUS at 17:53

## 2018-11-05 RX ADMIN — DOCUSATE SODIUM 100 MG: 100 CAPSULE, LIQUID FILLED ORAL at 19:35

## 2018-11-05 RX ADMIN — ACETAMINOPHEN 650 MG: 325 TABLET, FILM COATED ORAL at 12:09

## 2018-11-05 RX ADMIN — OXYCODONE HYDROCHLORIDE 5 MG: 5 TABLET ORAL at 15:46

## 2018-11-05 RX ADMIN — ATORVASTATIN CALCIUM 20 MG: 20 TABLET, FILM COATED ORAL at 08:39

## 2018-11-05 RX ADMIN — FLUOXETINE HYDROCHLORIDE 40 MG: 20 CAPSULE ORAL at 08:39

## 2018-11-05 RX ADMIN — POLYETHYLENE GLYCOL 3350 17 G: 17 POWDER, FOR SOLUTION ORAL at 18:50

## 2018-11-05 RX ADMIN — PREDNISONE 5 MG: 5 TABLET ORAL at 08:39

## 2018-11-05 RX ADMIN — VANCOMYCIN HYDROCHLORIDE 1500 MG: 10 INJECTION, POWDER, LYOPHILIZED, FOR SOLUTION INTRAVENOUS at 15:48

## 2018-11-05 ASSESSMENT — PAIN DESCRIPTION - DESCRIPTORS
DESCRIPTORS: THROBBING
DESCRIPTORS: SHARP
DESCRIPTORS: ACHING
DESCRIPTORS: THROBBING
DESCRIPTORS: SHARP

## 2018-11-05 ASSESSMENT — ACTIVITIES OF DAILY LIVING (ADL)
ADLS_ACUITY_SCORE: 11
ADLS_ACUITY_SCORE: 12
ADLS_ACUITY_SCORE: 11
ADLS_ACUITY_SCORE: 11

## 2018-11-05 NOTE — PLAN OF CARE
Problem: Patient Care Overview  Goal: Plan of Care/Patient Progress Review  Discharge Planner OT   Patient plan for discharge: home though open to rehab stay  Current status: Eval completed and tx initiated. OT role and POC discussed and patient demos understanding. Patient ambulated ~ 50 feet in hallway w/ support of fww for pain management initially demonstrating mild unsteadiness with gait (patient endorses that this is close to baseline for him), progressing to ambulation w/out support. Patient instructed in spinal precautions and proper body mechanics to protect spine from further injury and patient demos understanding with bed mobility and OOB activity today. Patient participated in grooming/hygiene while seated EOB today, requiring setup only. Discharge recommendations discussed today based on presentation, patient is agreeable to TCU stay.  Barriers to return to prior living situation: medical status, pain, deconditioning  Recommendations for discharge: TCU  Rationale for recommendations: Patient is limited by pain today, reports he does not have adequate support at home to maintain safety (wife has significant medical needs and would not be able to assist if needed), patient to benefit from continued skilled instruction to maximize independence with ADL/IADL/functional mobility.        Entered by: Alona Toney 11/05/2018 12:29 PM

## 2018-11-05 NOTE — PLAN OF CARE
3480-2128: Soft BPs. OVSS. Afebrile. PRN Dilaudid IV 0.2mg given x 1 w/o relief of back pain. Oxycodone PO 5mg given in addition w/ reduced pain after the oxy. Vancomycin given as scheduled. Neuros q4hrs intact. SBA. Denies N/V. Adequate urine output. Continue POC.

## 2018-11-05 NOTE — PLAN OF CARE
Problem: Patient Care Overview  Goal: Plan of Care/Patient Progress Review  Outcome: Improving  Temp max of 100.4, other VSS.  Pain managed with prn oxycodone.  Reports improved pain control today.  Worked with OT and waiting for PT to arrive.  Neuros intact.  ID consult ordered for recs regarding antibiotic coverage.

## 2018-11-05 NOTE — PROGRESS NOTES
11/05/18 0900   Quick Adds   Type of Visit Initial Occupational Therapy Evaluation   Living Environment   Lives With spouse   Living Arrangements apartment   Home Accessibility bed and bath on same level;tub/shower is not walk in   Number of Stairs to Enter Home (3rd floor, elevator access)   Number of Stairs Within Home 0   Stair Railings at Home none   Transportation Available car;family or friend will provide;public transportation   Living Environment Comment tub shower    Self-Care   Dominant Hand right   Usual Activity Tolerance excellent   Current Activity Tolerance fair   Regular Exercise yes   Activity/Exercise Type walking;strength training  (silver sneakers)   Exercise Amount/Frequency 1 hr  (per week)   Equipment Currently Used at Home none   Activity/Exercise/Self-Care Comment working 15 hours per week part-time    Functional Level Prior   Ambulation 0-->independent   Transferring 0-->independent   Toileting 0-->independent   Bathing 0-->independent   Dressing 0-->independent   Eating 0-->independent   Communication 0-->understands/communicates without difficulty   Swallowing 0-->swallows foods/liquids without difficulty   Cognition 0 - no cognition issues reported   Fall history within last six months no   Which of the above functional risks had a recent onset or change? toileting;bathing;dressing;eating;cognition;swallowing;transferring;ambulation;fall history   Prior Functional Level Comment reports PLOF indepenfent    General Information   Onset of Illness/Injury or Date of Surgery - Date 11/04/18   Referring Physician Adi Shin MD   Patient/Family Goals Statement return home to [facility]    Additional Occupational Profile Info/Pertinent History of Current Problem Mr. Ross is a 59 yo M with PMH kidney transplant 1993 on chronic immunosuppression with mycophenolate and prednisone, asthma, NSTEMI, chronic hepatitis B, arthritis presenting with low back pain. Patient states he has been  having increased low back pain for the past 1 day without any known triggering events. Has some L > R hip pain, but it is mild, and he has BL hip replacements with occasional flares. States fever of 100.6 measured at home last night. History of shingles in 8/2018. Denies any radiating pain down the legs, or weakness, numbness, bowel/bladder symptoms.   Precautions/Limitations spinal precautions  (as preventative pain measure )   Weight-Bearing Status - LUE full weight-bearing   Weight-Bearing Status - RUE full weight-bearing   Weight-Bearing Status - LLE full weight-bearing   Weight-Bearing Status - RLE full weight-bearing   General Info Comments Activity: Up with assist    Cognitive Status Examination   Orientation orientation to person, place and time   Level of Consciousness alert   Able to Follow Commands WNL/WFL   Personal Safety (Cognitive) WNL/WFL   Memory intact   Attention No deficits were identified   Organization/Problem Solving No deficits were identified   Executive Function No deficits were identified   Visual Perception   Visual Perception No deficits were identified   Sensory Examination   Sensory Quick Adds No deficits were identified   Pain Assessment   Patient Currently in Pain Yes, see Vital Sign flowsheet   Integumentary/Edema   Integumentary/Edema no deficits were identifed   Posture   Posture forward head position   Range of Motion (ROM)   ROM Quick Adds No deficits were identified   Strength   Manual Muscle Testing Quick Adds Other   Strength Comments not assessed today due to report of pain    Hand Strength   Hand Strength Comments WNL   Muscle Tone Assessment   Muscle Tone Quick Adds No deficits were identified   Coordination   Upper Extremity Coordination No deficits were identified   Gross Motor Coordination WNL  (gait pattern is different though pt reports 2/2 B replacemen)   Fine Motor Coordination WFL   Mobility   Bed Mobility Bed mobility skill: Rolling/Turning;Bed mobility skill: Sit  to supine   Bed Mobility Skill: Rolling/Turning   Level of Ransom - Bed Mobility Skill Rolling Turning stand-by assist   Physical Assist/Nonphysical Assist 1 person assist;verbal cues   Bed Mobility Skill: Sit to Supine   Level of [place]ansom: Sit/Supine contact guard   Physical Assist/Nonphysical Assist: Sit/Supine 1 person assist   Assistive Device: Sit/Supine bedrail   Transfer Skills   Transfer Transfer Skill: Stand to Sit   Transfer Comments SBA   Transfer Skill: Sit to Stand   Level of [place]: Sit/Stand stand-by assist   Physical Assist/Nonphysical Assist: Sit/Stand 1 person assist   Transfer Skill: Sit to Stand full weight-bearing   Lower Body Dressing   Level of R[place]: Dress Lower Body stand-by assist   Physical Assist/Nonphysical Assist: Dress Lower Body supervision;set-up required;nonverbal cues (demo/gestures)   Grooming   Level of Ransom: Grooming stand-by assist   Physical Assist/Nonphysical Assist: Grooming set-up required   Instrumental Activities of Daily Living (IADL)   Previous Responsibilities meal prep;housekeeping;laundry;shopping;finances;medication management;yardwork;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)   Activities of Daily Living Analysis   Impairments Contributing to Impaired Activities of Daily Living flexibility decreased;strength decreased;pain   General Therapy Interventions   Planned Therapy Interventions ADL retraining;IADL retraining;strengthening;progressive activity/exercise;transfer training   Clinical Impression   Criteria for Skilled Therapeutic Interventions Met yes, treatment indicated   OT Diagnosis decreased ADL/IADL-I   Influenced by the following impairments pain, decreased strength, decreased flexibility, fatigue    Assessment of Occupational Performance 3-5 Performance Deficits   Identified Performance Deficits functional mobility, functional transfers, toileting, bathing, dressing, community mobility, home maintaining    Clinical  "Decision Making (Complexity) Low complexity   Therapy Frequency daily   Predicted Duration of Therapy Intervention (days/wks) 11/12/18   Anticipated Discharge Disposition Home with Home Therapy   Risks and Benefits of Treatment have been explained. Yes   Patient, Family & other staff in agreement with plan of care Yes   Clinical Impression Comments Patient presents today with deficits in functional mobility, functional transfers, toileting, dressing, bathing, and home maintaining all leading to decreased ADL-I. Patient to benefit from skilled instruction to address the following problem list.    [facility] AM-PAC  \"6 Clicks\" Daily Activity Inpatient Short Form   1. Putting on and taking off regular lower body clothing? 3 - A Little   2. Bathing (including washing, rinsing, drying)? 3 - A Little   3. Toileting, which includes using toilet, bedpan or urinal? 2 - A Lot   4. Putting on and taking off regular upper body clothing? 3 - A Little   5. Taking care of personal grooming such as brushing teeth? 4 - None   6. Eating meals? 4 - None   Daily Activity Raw Score (Score out of 24.Lower scores equate to lower levels of function) 19   Total Evaluation Time   Total Evaluation Time (Minutes) 6     "

## 2018-11-05 NOTE — CONSULTS
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 Transplant nephrology consulted for IS management since there is a suspicion for infection, septic bursitis.    SP DDKT 10/1993  bl creatinine 0.7-1.1-- stable  Immunosuppression with MMF 750mg BiD and prednisone 5  CMV/EBV titers -ve last checked 2017  -- will continue on the same IS dose for now since he he is on minimal IS   -- follow on ID recs for ntibiotics since the diagnosis is based on imaging with a lot of degenerative changes    Lumbar ? 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  Hx of hypogonadism does put him at risk of osteoporosis as well  On calcium and vit D supplements    Hx of restrictive lung disease and cough  On Isordil for pulm HTN  At baseline  Management per primary    Chronic hep B on Entecavir  Hep B load undetectable    Recommendations were communicated to primary team     Seen and discussed with Dr Zoe Woods  Nephrology fellow   [phone]    REASON FOR CONSULT: Management of immunosuppression, Hx of kidney Tx    HISTORY OF PRESENT ILLNESS:  Jerad Ross is a 58 year old male with a 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 Transplant nephrology consulted for IS management since there is a suspicion for infection, septic bursitis.  He reports that he had been doing well with minimal back pain off and on and had a low grade temp to 99 on and off. But he had significant worsening in pain , sudden onset and was limiting his mobility , no response to ibuprofen with a Tmax of 100.8. He was unable to walk and presented to ED.  He reports an episode of shingles on his rt side abdomen wall in aug 2018.  He has a creatinine ~0.7-1.1 at baseline and he is at his baseline renal functions. He was seen by neurosurgery after MRI showed L4-L5 bursal cyst with possible epidural thickening and Paraspinous fluid collection but non drain able. No Bx is planned and he is on vanco and Ertapenem for empirical treatment pending ID recs.    PAST MEDICAL HISTORY:  Reviewed with patient on 11/05/2018     Past Medical History:   Diagnosis Date     AION (acute ischemic optic neuropathy)      Anemia in chronic renal disease      Avascular necrosis of bones of both hips (H)     s/p bilateral hip replacements     Basal cell carcinoma      Chronic hepatitis B (H)      Clostridium difficile colitis      Coronary artery disease involving native coronary artery of native heart without angina pectoris 6/17/2014    Coronary angiogram 6/17/14: Severe distal 3-vessel disease involving small vessels, not amenable to PCI or CABG.     CRP elevated      Depression      Diverticulosis      Dyslipidemia      FSGS (focal segmental glomerulosclerosis)      Gastric ulcer with hemorrhage 2/12/12     GERD (gastroesophageal reflux disease)      Glaucoma     OHTN     Hemorrhoids      Hypertension secondary to other renal disorders      Hypogonadism in male      Immunosuppressed status (H)      Kidney replaced by transplant     focal glomerulosclerosis       NSTEMI (non-ST elevated myocardial infarction) (H) 6/17/2014     JULIANE (obstructive sleep apnea)     Doesn't use CPAP     Paracentral scotoma     LE     Secondary renal hyperparathyroidism (H)      Squamous cell carcinoma        Past Surgical History:   Procedure Laterality Date     APPENDECTOMY       CATARACT IOL, RT/LT  4/19/2000    RE     CATARACT IOL, RT/LT  6/1/2000    LE     COLECTOMY SUBTOTAL  1983    10 cm, diverticulitis     COLONOSCOPY  2/13/2012    Procedure:COLONOSCOPY; Surgeon:SLOAN GALLARDO; Location:[facility]     ESOPHAGOSCOPY, GASTROSCOPY, DUODENOSCOPY (EGD), COMBINED  2/13/2012    Procedure:COMBINED ESOPHAGOSCOPY, GASTROSCOPY, DUODENOSCOPY (EGD); Surgeon:SLOAN GALLARDO; Location:[facility]     EXTRACAPSULAR CATARACT EXTRATION WITH INTRAOCULAR LENS IMPLANT  4-20-10, 6-1-10    Rt, Lt     HERNIA REPAIR  1995    Lt inguinal     HIP SURGERY      1981, bilat MITZI, revised 2001, 2005     KIDNEY SURGERY  1978 and 1993    transplant     KNEE SURGERY  1983, 1987    bilat TKA     MOHS MICROGRAPHIC PROCEDURE       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, auxiliary spleen     TONSILLECTOMY          MEDICATIONS:  PTA Meds  Prior to Admission medications    Medication Sig Last Dose Taking? Auth Provider   acetaminophen (TYLENOL) 325 MG tablet Take 1-2 tablets by mouth every 6 hours as needed. 11/3/2018 at PM Yes Reported, Patient   amLODIPine (NORVASC) 5 MG tablet Take 1 tablet (5 mg) by mouth daily 11/3/2018 at Unknown time Yes Balta Orantes MD   aspirin 81 MG tablet Take 81 mg by mouth daily  11/3/2018 at Unknown time Yes Reported, Patient   atorvastatin (LIPITOR) 20 MG tablet Take 1 tablet (20 mg) by mouth daily 11/3/2018 at AM Yes Balta Orantes MD   calcium citrate-vitamin D (CITRACAL) 315-200 MG-UNIT TABS Take 1 tablet by mouth daily. 11/3/2018 at Unknown time Yes Reported, Patient   CHOLECALCIFEROL PO Take 1 tablet by mouth daily Dose unknown 11/3/2018 at Unknown time Yes  Unknown, Entered By History   clopidogrel (PLAVIX) 75 MG tablet Take 1 tablet (75 mg) by mouth daily 11/3/2018 at Unknown time Yes Balta Orantes MD   entecavir (BARACLUDE) 0.5 MG tablet Take 1 tablet (0.5 mg) by mouth daily 11/3/2018 at Unknown time Yes Lina Claros MD   FLUoxetine (PROZAC) 40 MG capsule Take 1 capsule (40 mg) by mouth every morning 11/3/2018 at Unknown time Yes Genia Fraser APRN CNP   isosorbide mononitrate (IMDUR) 30 MG 24 hr tablet Take 0.5 tablets (15 mg) by mouth daily 11/3/2018 at Unknown time Yes Balta Orantes MD   Multiple Vitamins-Iron (ONE DAILY MULTIVITAMIN/IRON) TABS Take 1 tablet by mouth daily 11/3/2018 at Unknown time Yes Unknown, Entered By History   mycophenolate (GENERIC EQUIVALENT) 250 MG capsule Take 3 capsules (750 mg) by mouth 2 times daily 11/3/2018 at Unknown time Yes Nolan Lovett MD   Omega-3 Fatty Acids (OMEGA 3 PO) Take 1 capsule by mouth daily Dose unknown 11/3/2018 at Unknown time Yes Reported, Patient   omeprazole (PRILOSEC OTC) 20 MG tablet Take  by mouth daily. 11/3/2018 at Unknown time Yes Aston Perry MD   predniSONE (DELTASONE) 5 MG tablet Take 1 tablet (5 mg) by mouth daily 11/3/2018 at Unknown time Yes Nolan Lovett MD   albuterol (PROAIR HFA) 108 (90 Base) MCG/ACT Inhaler Inhale 2 puffs into the lungs every 6 hours as needed for shortness of breath / dyspnea or wheezing   Aston Perry MD   BETA BLOCKER NOT PRESCRIBED, INTENTIONAL, Beta Blocker not prescribed intentionally due to Bradycardia < 50 bpm without beta blocker therapy   Carolyn Espinoza PA-C   BUDESONIDE, INHALATION, 90 MCG/ACT AEPB Inhale 2 puffs into the lungs 2 times daily NOT STARTED  Sisi Lockwood MD   fluticasone (FLONASE) 50 MCG/ACT spray Spray 1-2 sprays into both nostrils daily More than a month at Unknown time  Aston Perry MD   fluticasone-salmeterol (ADVAIR) 250-50 MCG/DOSE  diskus inhaler Inhale 1 puff into the lungs every 12 hours   Wilbert Buck MD   latanoprost (XALATAN) 0.005 % ophthalmic solution Place 1 drop into both eyes At Bedtime 11/2/2018 at PM  Viki Rizzo MD      Current Meds    amLODIPine  5 mg Oral Daily     aspirin  81 mg Oral Daily     atorvastatin  20 mg Oral Daily     docusate sodium  100 mg Oral BID     enoxaparin  40 mg Subcutaneous Q24H     entecavir  0.5 mg Oral Daily     ertapenem (INVanz) IV  1 g Intravenous Q24H     FLUoxetine  40 mg Oral QAM     isosorbide mononitrate  15 mg Oral Daily     latanoprost  1 drop Both Eyes At Bedtime     mycophenolate  750 mg Oral BID     omeprazole  20 mg Oral QAM     predniSONE  5 mg Oral Daily     sodium chloride (PF)  3 mL Intracatheter Q8H     vancomycin (VANCOCIN) IV  1,500 mg Intravenous Q12H     Infusion Meds      ALLERGIES:    Allergies   Allergen Reactions     Penicillins Shortness Of Breath and Hives     Keflex [Cephalexin Hcl] Other (See Comments)     Pt could not recall reaction     Tetracycline Other (See Comments)     Patient could not recall reaction     Sulfa Drugs Rash       REVIEW OF SYSTEMS:  A 10 point review of systems was negative except as noted above.    SOCIAL HISTORY:   Social History     Social History     Marital status:      Spouse name: N/A     Number of children: 0     Years of education: N/A     Occupational History      Disabled      [employer]     Social History Main Topics     Smoking status: Former Smoker     Packs/day: 1.00     Years: 9.00     Quit date: 1/1/1988     Smokeless tobacco: Former User     Alcohol use 0.0 oz/week     0 Standard drinks or equivalent per week      Comment: rarely 1 drink per month     Drug use: No     Sexual activity: Not on file     Other Topics Concern     Special Diet No     Exercise Yes     walks     Social History Narrative    . Wife is also a kidney transplant recipient.     Works part-time     Reviewed with patient    Wife accompanies Jerad Ross in hospital room    FAMILY MEDICAL HISTORY:   Family History   Problem Relation Age of Onset     Cardiovascular Father      AI with valve repair     Hypertension Father      KIDNEY DISEASE Father      Cancer Paternal Grandmother      ovarian ca     Cerebrovascular Disease Paternal Grandfather      Cerebrovascular Disease Maternal Grandfather      KIDNEY DISEASE Paternal Aunt      Skin Cancer No family hx of      Glaucoma No family hx of      Macular Degeneration No family hx of      Amblyopia No family hx of      Reviewed with patient     PHYSICAL EXAM:   Temp  Av.6  F (37  C)  Min: 97.3  F (36.3  C)  Max: 99.6  F (37.6  C)      Pulse  Av.7  Min: 60  Max: 71 Resp  Av.7  Min: 16  Max: 20  SpO2  Av %  Min: 85 %  Max: 99 %       /64 (BP Location: Left arm)  Pulse 60  Temp 98.5  F (36.9  C) (Oral)  Resp 16  Wt 82.5 kg (181 lb 14.1 oz)  SpO2 91%  BMI 27.65 kg/m2       Admit Weight: 79.4 kg (175 lb)     GENERAL APPEARANCE: alert and no distress  Head NC/AT  EYES: - scleral icterus, pupils equal  HENT: mouth  without ulcers or lesions  NECK: supple, no goiter  Lymphatics: no cervical or supraclavicular LAD  Pulmonary: lungs clear to auscultation with equal breath sounds bilaterally, no clubbing or cyanosis  CV: regular rhythm, - rate, no rub   - JVP -   - Edema-  GI: soft, nontender, normal bowel sounds, no HSM   MS: no evidence of inflammation in joints, no muscle tenderness  : no [place]  SKIN: no rash, warm, dry  NEURO: mentation intact and speech normal    LABS:   CMP  Recent Labs  Lab 1839 18  1214     --  140   POTASSIUM 3.6  --  3.3*   CHLORIDE 107  --  107   CO2 24  --  27   ANIONGAP 8  --  6   GLC 80  --  77   BUN 11  --  10   CR 0.69 0.75 0.69   GFRESTIMATED >90 >90 >90   GFRESTBLACK >90 >90 >90   HEMA 8.4*  --  8.3*   MAG 1.7  --   --    PHOS 2.9  --   --      CBC  Recent Labs  Lab 1839  11/04/18  2020 11/04/18  1214   HGB 11.7*  --  12.5*   WBC 9.6  --  9.6   RBC 4.18*  --  4.39*   HCT 38.4*  --  40.4   MCV 92  --  92   MCH 28.0  --  28.5   MCHC 30.5*  --  30.9*   RDW 15.4*  --  15.3*    280 307     INRNo lab results found in last 7 days.  ABGNo lab results found in last 7 days.   URINE STUDIES  Recent Labs   Lab Test  11/04/18   2128  01/05/11   0916   COLOR  Yellow  Yellow   APPEARANCE  Clear  Clear   URINEGLC  Negative  Negative   URINEBILI  Negative  Negative   URINEKETONE  10*  Negative   SG  1.020  1.012   UBLD  Negative  Negative   URINEPH  6.5  6.0   PROTEIN  10*  Negative   NITRITE  Negative  Negative   LEUKEST  Negative  Negative   RBCU  1  0   WBCU  1  0     Recent Labs   Lab Test  10/23/18   1122  06/21/18   1209  06/10/14   1029  05/23/13   0842   UTPG  0.21*  0.12  <0.05  <0.05     PTH  No lab results found.  IRON STUDIES  No lab results found.    IMAGING:  All imaging studies reviewed by me.     Bhanu Woods MD

## 2018-11-05 NOTE — PROGRESS NOTES
[facility], [facility] Progress Note - Jazmine's Service   Main Plans for Today   - Consult transplant ID - appreciate recs  - PT/OT to help with increasing mobility  - Discontinue IV pain meds    Assessment & Plan   Jerad Ross is a 58 year old male admitted on 2018. He has a history of  donor renal transplant, skin cancer, chronic back pain, chronic anemia, vitamin D deficiency, essential hypertension, coronary artery disease and is admitted for L4-L5 infected interspinous bursal cyst with adjacent reactive epidural thickening and paraspinous cellulitis     # Acute back pain  # Infected L4-5 interspinous bursal cyst  # Paraspinous cellulitis  Cyst is causing patient significant pain and is likely infected. CRP and ESR are elevated  (, ESR 48), both increased from , suggesting an inflammatory process, but no evidence for sepsis as he is afebrile and WBC is normal. Neurosurgery evaluated, no indication for surgical intervention at this point. Will consult transplant ID given his history renal transplant and immunosuppression for help with antibtioic changes. Symptoms are improving - pain reduced and able to get out of bed and walk with less pain than on admission on .  - Neurosurgery consulted, no surgical interventions at this time, appreciate recommendations  - Possible IR aspiration for cell count, culture, gram stain  - Consult transplant ID today for antibiotic recs  - IV pain control  - Continue vancomycin and ertapenem for now  - Probiotic to decrease risk of antibiotic associated diarrhea and C. Diff infection  - Avoid ibuprofen/NSAIDS given renal transplant     # DDKT (Baseline creatinine 0.8-1.1)  Cr has been normal at 0.69-0.75 while in hospital.  - Consulted transplant nephrology, appreciate recs  - Continue CellCept at current dose per nephrology because dose is minimal, await transplant ID recs  - Continue PTA prednisone  5mg daily, consider stress dose steroids if signs of sepsis or hypotension  - Avoid nephrotoxic agents     # Chronic Hepatitis B  Continue PTA entecavir     # Major Depressive Disorder  Continue PTA Fluoxetine     # CAD  # HTN  Continue PTA IMDUR, Plavix, ASA 81, Amlodipine, lipitor     # Restrictive pattern on PFTs  PFTs showing mildly restrictive pattern, seen by pulmonology who said it could be mild asthma.  - Continue PTA advair, PRN albuterol    # Pain Assessment:  Current Pain Score 2018   Patient currently in pain? yes   Pain score (0-10) -   Pain location Back   Pain descriptors -   - Jerad is experiencing pain due to lumbar intraspinous septic bursitis. Pain management was discussed and the plan was created in a collaborative fashion.  Jerad's response to the current recommendations: engaged  - Please see the plan for pain management as documented above    Diet: Regular Diet Adult  Fluids: PO  DVT Prophylaxis: Lovenox  Code Status: Full Code    Disposition Plan   Expected discharge: 2 - 3 days, recommended to prior living arrangement once adequate pain management/ tolerating PO medications, antibiotic plan established, safe disposition plan/ TCU bed available and SIRS/Sepsis treated. Dispo: Expected Discharge Date: 18        Entered: Kaelyn Salguero 2018, 7:41 AM   Information in the above section will display in the discharge planner report.      The patient's care was discussed with the Attending Physician, Dr. Villanueva.    Kaelyn Salguero, MS3  [facility]  Pager: [phone]  Please see sticky note for cross cover information    Hospital Course  Jerad Ross is a 58 year old male with a history of  donor renal transplant (baseline Cr 0.8-1.1), HTN, CAD, chronic cough, anemia in chronic renal disease, vitamin D deficiency, chronic hepatitis B, basal cell carcinoma, squamous cell skin carcinoma, who was admitted 2018 for  acute mid-lumbar back pain, L>R, which developed 36 hours PTA and progressively worsened. He also had a fever up to 100.8. Lumbar spine MRI showed L4-5 interspinous bursal cyst with adjacent reactive epidural thickening and paraspinous cellulitis. Neurosurgery consulted, recommended no surgical intervention at this time, will continue to watch while on antibiotics. Currently on vancomycin and ertapenem.    Interval History   Continues to have pain in lower back with movement, especially getting up. Has not been up out of bed yet today. Feels okay lying down. Pain was controlled well enough overnight for him to get some good sleep. Not feeling nauseated this morning. No BMs since admission. Not feeling constipated. No obvious weakness in legs but has not been out of bed. No numbness/tingling in legs or feet.     Physical Exam   Vital Signs: Temp: 98.5  F (36.9  C) Temp src: Oral BP: 104/64 Pulse: 60 Heart Rate: 60 Resp: 16 SpO2: 91 % O2 Device: None (Room air)    Weight: 181 lbs 14.07 oz  General: lying in bed, appears slightly uncomfortable  HEENT: normocephalic, atraumatic  Respiratory: lungs clear to auscultation bilaterally, no wheezes, rhonchi, or rales  Cardiac: regular rate and rhythm, S1/S2 heard, no murmurs, rubs, or gallops, pedal pulses 3+ bilaterally  Musculoskeletal: no tenderness to palpation of lumbar spinal region of back  Skin: many brown, raised, ~2 mm lesions throughout all exposed skin areas  Neurologic: cranial nerves grossly intact, dorsiflexion and plantar flexion strength 5/5 bilaterally, sensation intact in bilateral feet  Psychiatric: appropriate mood and affect    Data     Recent Labs  Lab 11/05/18  0739 11/04/18  2020 11/04/18  1214   WBC 9.6  --  9.6   HGB 11.7*  --  12.5*   MCV 92  --  92    280 307     --  140   POTASSIUM 3.6  --  3.3*   CHLORIDE 107  --  107   CO2 24  --  27   BUN 11  --  10   CR 0.69 0.75 0.69   ANIONGAP 8  --  6   HEMA 8.4*  --  8.3*   GLC 80  --  77        Recent Results (from the past 24 hour(s))   MR Lumbar Spine w/o & w Contrast    Narrative    MR LUMBAR SPINE W/O & W CONTRAST 11/4/2018 1:46 PM    Provided History: lumbar back pain radiating to left hip, fevers,  immune suppressed, rule out osteomyelitis versus abscess; .    Comparison: Abdominal MRI 7/13/2015    Technique: Sagittal T1-weighted and T2-weighted and axial T2-weighted  images of the lumbar spine were obtained without intravenous contrast.  Post intravenous contrast using gadolinium axial and sagittal  T1-weighted images were obtained with fat saturation.    Contrast: 7.9CC GADAVIST     Findings:   5 lumbar-type vertebrae counting down from the presumed 12th ribs. The  tip the conus medullaris is at L1-2. Cauda equina nerve roots and  visualized thoracic cord are normal.    Asymmetric left facet joint effusions and interspinous edema with 2 mm  dorsal epidural bursal cyst formation. There is abnormal enhancing  edema within the paraspinous musculature bilaterally at L2-pelvis. No  discrete drainable fluid collection. Abnormal epidural  thickening/enhancement L4-S1. No abnormal intrathecal enhancement.    Normal alignment of the lumbar vertebrae. Normal lumbar lordosis. Disc  degeneration at L1-2 with loss of disc height and intradiscal T2  signal. Severe asymmetric right psoas muscular atrophy. Multiple cm  nonenhancing well-circumscribed heterogeneous hemorrhagic signal focus  in the right perivertebral space in the expected location of the right  psoas muscle at the level of L5 measuring up to 2.7 cm in maximal  dimension, unchanged dating back to at least 7/13/2015, probable  chronic organized hematoma.     Findings on a level by level basis are as follows:    T12-L1: No spinal canal or neural foraminal stenosis.    L1-2: Disc bulge. Mild spinal canal stenosis. No significant neural  foraminal stenosis.    L2-3: No spinal canal or neural foraminal stenosis.    L3-4: Disc bulge. Mild bilateral  neural foraminal stenosis. No  significant spinal canal stenosis.    L4-5: Disc bulge and facet arthrosis. Moderate bilateral neural  foraminal stenosis. No significant spinal canal stenosis.    L5-S1: Facet arthrosis. No spinal canal or neural foraminal stenosis.    Atrophic kidneys.      Impression    Impression:  1. Findings suspicious for infected L4-5 interspinous bursal cyst with  adjacent reactive epidural thickening and paraspinous cellulitis. No  drainable fluid collection.  2. Multilevel lumbar spondylosis. Moderate bilateral neural foraminal  stenosis at L4-5. Mild spinal canal stenosis at L1-2.  3. Multiple well-circumscribed hemorrhagic foci in the right  perivertebral soft tissues at L5 in the location of a severely  atrophied right psoas muscle, unchanged dating back to at least  7/13/2015, suspected chronic organized hematoma.    [Result: Infected interspinous bursal cyst]    Finding was identified on 11/4/2018 1:37 PM.     Dr. Han was contacted by Dr. Carpenter at 11/4/2018 1:56 PM and  verbalized understanding of the urgent finding.     ARVIN CARPENTER MD     Medications       amLODIPine  5 mg Oral Daily     aspirin  81 mg Oral Daily     atorvastatin  20 mg Oral Daily     docusate sodium  100 mg Oral BID     enoxaparin  40 mg Subcutaneous Q24H     entecavir  0.5 mg Oral Daily     ertapenem (INVanz) IV  1 g Intravenous Q24H     FLUoxetine  40 mg Oral QAM     isosorbide mononitrate  15 mg Oral Daily     latanoprost  1 drop Both Eyes At Bedtime     mycophenolate  750 mg Oral BID     omeprazole  20 mg Oral QAM     predniSONE  5 mg Oral Daily     sodium chloride (PF)  3 mL Intracatheter Q8H     vancomycin (VANCOCIN) IV  1,500 mg Intravenous Q12H     Resident/Fellow Attestation   I, Tree Stahl, was present with the medical student who participated in the service and in the documentation of the note.  I have verified the history and personally performed the physical exam and medical decision  making.  I agree with the assessment and plan of care as documented in the note.      Tree Stahl DO, MA  Family Medicine PGY-2  [facility], [facility]

## 2018-11-05 NOTE — PROGRESS NOTES
[facility], [place]   Allergy Assessment and Penicillin Allergy Skin Test (PAST)   Antimicrobial Management Team (AMT) Note                Allergy History:   Question Response   What was the name of agent which caused the event?  Penicillin (PO)    When did the reaction occur?  The patient states this happened as a child, but unsure of how old he was   What was the nature of the event? Including symptoms and severity  (rash, hives, anaphylaxis, SOB, hospitalization) The patient reports hives. He does not remember if he was short of breath or had difficulty breathing    What was the course of the reaction?   Unknown    How long after taking the medication did it take for the symptoms to begin?  Unknown   How was the reaction treated?   (Antihistamines, steroids, inhalers, etc)  Unknown   Did you ever experience symptoms like this before?  The patient states he does not remember another incidence like this occurring   Can you name other antibiotics you remember taking and tolerated?  The patient could not remember   Have you ever taken drugs similar to penicillin?   Cephalosporins such as Cephalexin (Keflex), Cefepime (Maxipime), Ceftriaxone (Rocephin)?Carbapenems such as Imipenem (Primaxin), Meropenem (Merrem), Etrapenem (Invanz)    The patient has tolerated carbapenems (ertapenem and meropenem), and has tolerated cefpodoxime at our institution in 2014.    The patient also reports a reaction to cephalexin, but cannot remember what happened and believes this is when he was a child. He was not able to provide further details.       Discussed with ID Staff - Jessica Rosado, Jose DanielD  Carlee Mendoza, Jose DanielD Student

## 2018-11-05 NOTE — PROGRESS NOTES
" 11/05/18 1509   Quick Adds   Type of Visit Initial PT Evaluation      Language English   Living Environment   Lives With spouse   Living Arrangements apartment   Home Accessibility bed and bath on same level;tub/shower is not walk in   Number of Stairs to Enter Home 0  (elevator within apartment complex)   Number of Stairs Within Home 0   Stair Railings at Home none   Transportation Available family or friend will provide;car   Living Environment Comment Pt lives with wife in an apartment. Pt is on disability currently from his work. 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    Self-Care   Dominant Hand right   Usual Activity Tolerance good   Current Activity Tolerance fair   Regular Exercise yes   Activity/Exercise Type walking;strength training   Exercise Amount/Frequency 1 hr   Equipment Currently Used at Home none   Activity/Exercise/Self-Care Comment In order to get to job pt utilizes the bus to get to/from work and has to walk to the bus stop (this is considered his exercise).    Functional Level Prior   Ambulation 0-->independent   Transferring 0-->independent   Toileting 0-->independent   Bathing 0-->independent   Dressing 0-->independent   Eating 0-->independent   Communication 0-->understands/communicates without difficulty   Swallowing 0-->swallows foods/liquids without difficulty   Cognition 0 - no cognition issues reported   Fall history within last six months no   Which of the above functional risks had a recent onset or change? ambulation;transferring   Prior Functional Level Comment Pt states that he is IND at baseline. Pt does normally have a decreased gait speed and does not walk \"normal.\"    General Information   Onset of Illness/Injury or Date of Surgery - Date 11/04/18   Referring Physician Adi Shin MD   Patient/Family Goals Statement Disch home   Pertinent History of Current Problem (include personal factors and/or comorbidities that impact the POC) 58 year old male " with a history of  donor renal transplant with a baseline creatinine of 0.8-1.1, hypertension, coronary artery disease, chronic cough, anemia in chronic renal disease, vitamin D deficiency, chronic hepatitis B, basal cell carcinoma, squamous cell skin carcinoma, who presents for acute back pain.   Precautions/Limitations spinal precautions  (2/2 pain + diagnosis)   General Observations Pt is UP SBA for in room activity with occassional need for UE support on furniture or objects in room.   General Info Comments Activity: Up with assist   Cognitive Status Examination   Orientation orientation to person, place and time   Level of Consciousness alert   Follows Commands and Answers Questions 100% of the time   Personal Safety and Judgment intact   Memory intact   Cognitive Comment No concerns with cognition   Pain Assessment   Patient Currently in Pain Yes, see Vital Sign flowsheet   Integumentary/Edema   Integumentary/Edema Comments No new deficits noted by patient   Posture    Posture Forward head position;Protracted shoulders;Kyphosis   Range of Motion (ROM)   ROM Comment WFL for functional mobility observed during evaluation   Strength   Strength Comments WFL for functional mobility observed during evaluation   Bed Mobility   Bed Mobility Comments SBA   Transfer Skills   Transfer Comments SBA   Gait   Gait Comments SBA + furniture surfing   Balance   Balance Comments Sitting balance is good. Standing balance is poor.   Sensory Examination   Sensory Perception Comments No new numbness or tingling.   General Therapy Interventions   Planned Therapy Interventions neuromuscular re-education;gait training;strengthening;risk factor education;home program guidelines   Clinical Impression   Criteria for Skilled Therapeutic Intervention yes, treatment indicated   PT Diagnosis Impaired functional mobility   Influenced by the following impairments Pain, decreased balance and strength   Functional limitations due to  "impairments Increase assistance/supervision required for safe mobility.   Clinical Presentation Stable/Uncomplicated   Clinical Presentation Rationale Clinical judgement and chart review   Clinical Decision Making (Complexity) Low complexity   Therapy Frequency` other (see comments)  (4x/week)   Predicted Duration of Therapy Intervention (days/wks) days   Anticipated Equipment Needs at Discharge standard cane   Anticipated Discharge Disposition Home with Assist;Home with Outpatient Therapy;Home with Home Therapy   Risk & Benefits of therapy have been explained Yes   Patient, Family & other staff in agreement with plan of care Yes   Clinical Impression Comments Evaluation completed. Treamtent indicated 2/2 to pain, decreased strength, and balance deficits. PT recommends that pt uses a walker for in hospital mobility with supervision and potentially use of cane for disch.    [facility] [employer] \"6 Clicks\"   2016, Trustees of [facility], under license to [employer].  All rights reserved.   6 Clicks Short Forms Basic Mobility Inpatient Short Form   [facility] AM-PAC  \"6 Clicks\" V.2 Basic Mobility Inpatient Short Form   1. Turning from your back to your side while in a flat bed without using bedrails? 4 - None   2. Moving from lying on your back to sitting on the side of a flat bed without using bedrails? 3 - A Little   3. Moving to and from a bed to a chair (including a wheelchair)? 3 - A Little   4. Standing up from a chair using your arms (e.g., wheelchair, or bedside chair)? 4 - None   5. To walk in hospital room? 3 - A Little   6. Climbing 3-5 steps with a railing? 2 - A Lot   Basic Mobility Raw Score (Score out of 24.Lower scores equate to lower levels of function) 19   Total Evaluation Time   Total Evaluation Time (Minutes) 8     "

## 2018-11-05 NOTE — PLAN OF CARE
Problem: Patient Care Overview  Goal: Plan of Care/Patient Progress Review   7C PT - HOLD. PT evaluation acknowledged and appreciated. Per discussion with OT (who initiated today for therapies) and chart review, PT is to hold today to improve pain management for more appropriate reflection of the pt's current mobility status. Will schedule evaluation forward to tomorrow.

## 2018-11-05 NOTE — PLAN OF CARE
Problem: Patient Care Overview  Goal: Plan of Care/Patient Progress Review  Discharge Planner PT 7C  Patient plan for discharge: home with wife  Current status: Initiated and completed PT evaluation. Treatment indicated 2/2 pain, gait pattern, decreased balance, and decrease strength. Supine > EOB SBA for log roll technique. STS CGA no assistive device. Ambulation is CGA with no assistive device - PT recommending trial of cane for next session and in the mean time use of walker for in room and out of room mobility with supervision. PT terminated session 2/2 to back pain. Pt reports that he close to baseline - decreased gait speed and increased antalgic limp 2/2 back pain. IP PT to follow for balance, strengthening, gait endurance, and safe gait pattern.   Barriers to return to prior living situation: medical status, pain, education on use of assistive device for safe disch  Recommendations for discharge: Anticipate home with wife with assistive device for ambulation   Rationale for recommendations: PT anticipates that pt will be able to demonstrate safe mobility to dish home with supervision of wife or Herminia.        Entered by: Barbara Higuera 11/05/2018 3:53 PM

## 2018-11-05 NOTE — PLAN OF CARE
Problem: Patient Care Overview  Goal: Plan of Care/Patient Progress Review  Outcome: Therapy, progress toward functional goals as expected  Direct admit from ED. Arrived in 7C via stretcher. Alert and oriented. Oriented to room, unit, POC, pain management and call light. Hand out provided per unit protocol. Pt describe lower back pain constant and sharp. Prn IV dilaudid 0.2 mg given with relief. Diet advance to regular. Pt tolerating it well. OVSS, afebrile. neuros intact.  Pt skin have some small abrasion, rashes per pt secondary to his kidney transplant medication. Lung sounds clear bilaterally, bowel sounds audible. Baseline, pt is independent at home. PIV-SL. OVSS,afebrile. On IV abx. Spoke with kevin vazquez regarding pt's K+ earlier 3.3. Per resident, she's okay with that for now. PLAN: To continue with the care plan. Pain management, IV abx, safety and VS.

## 2018-11-05 NOTE — PROGRESS NOTES
11/05/18 0900   Quick Adds   Type of Visit Initial Occupational Therapy Evaluation   Living Environment   Lives With spouse   Living Arrangements apartment   Number of Stairs to Enter Home (3rd floor, elevator access)   Number of Stairs Within Home 0   Stair Railings at Home none   Transportation Available car;family or friend will provide;public transportation   Living Environment Comment tub shower    Self-Care   Dominant Hand right   Usual Activity Tolerance excellent   Current Activity Tolerance fair   Regular Exercise yes   Activity/Exercise Type walking;strength training  (silver sneakers)   Exercise Amount/Frequency 1 hr   Equipment Currently Used at Home none   Activity/Exercise/Self-Care Comment working 15 hours per week part-time    Functional Level Prior   Ambulation 0-->independent   Transferring 0-->independent   Toileting 0-->independent   Bathing 0-->independent   Dressing 0-->independent   Eating 0-->independent   Communication 0-->understands/communicates without difficulty   Swallowing 0-->swallows foods/liquids without difficulty   Cognition 0 - no cognition issues reported   Fall history within last six months no   Which of the above functional risks had a recent onset or change? toileting;bathing;dressing;eating;cognition;swallowing;transferring;ambulation;fall history   Prior Functional Level Comment reports [facility] imndepenfent    General Information   Onset of Illness/Injury or Date of Surgery - Date 11/04/18   Referring Physician Adi Shin MD   Patient/Family Goals Statement return home to [facility]    Additional Occupational Profile Info/Pertinent History of Current Problem Mr. Ross is a 57 yo M with PMH kidney transplant 1993 on chronic immunosuppression with mycophenolate and prednisone, asthma, NSTEMI, chronic hepatitis B, arthritis presenting with low back pain. Patient states he has been having increased low back pain for the past 1 day without any known triggering events. Has  some L > R hip pain, but it is mild, and he has BL hip replacements with occasional flares. States fever of 100.6 measured at home last night. History of shingles in 8/2018. Denies any radiating pain down the legs, or weakness, numbness, bowel/bladder symptoms.   Precautions/Limitations spinal precautions   Weight-Bearing Status - LUE full weight-bearing   Weight-Bearing Status - RUE full weight-bearing   Weight-Bearing Status - LLE full weight-bearing   Weight-Bearing Status - RLE full weight-bearing   General Info Comments Activity: Up with assist    Cognitive Status Examination   Orientation orientation to person, place and time   Level of Consciousness alert   Able to Follow Commands WNL/WFL   Personal Safety (Cognitive) WNL/WFL   Memory intact   Attention No deficits were identified   Organization/Problem Solving No deficits were identified   Executive Function No deficits were identified   Visual Perception   Visual Perception No deficits were identified   Sensory Examination   Sensory Quick Adds No deficits were identified   Pain Assessment   Patient Currently in Pain Yes, see Vital Sign flowsheet   Integumentary/Edema   Integumentary/Edema no deficits were identifed   Posture   Posture forward head position   Range of Motion (ROM)   ROM Quick Adds No deficits were identified   Strength   Manual Muscle Testing Quick Adds Other   Strength Comments not assessed today due to report of pain    Hand Strength   Hand Strength Comments WNL   Muscle Tone Assessment   Muscle Tone Quick Adds No deficits were identified   Coordination   Upper Extremity Coordination No deficits were identified   Gross Motor Coordination WNL  (gait pattern is different though pt reports 2/2 B replacemen)   Fine Motor Coordination WFL   Mobility   Bed Mobility Bed mobility skill: Rolling/Turning;Bed mobility skill: Sit to supine   Bed Mobility Skill: Rolling/Turning   Level of [place] - Bed Mobility Skill Rolling Turning stand-by  assist   Physical Assist/Nonphysical Assist 1 person assist;verbal cues   Bed Mobility Skill: Sit to Supine   Level of [place]: Sit/Supine contact guard   Physical Assist/Nonphysical Assist: Sit/Supine 1 person assist   Assistive Device: Sit/Supine bedrail   Transfer Skills   Transfer Transfer Skill: Stand to Sit   Transfer Comments SBA   Transfer Skill: Sit to Stand   Level of [place]: Sit/Stand stand-by assist   Physical Assist/Nonphysical Assist: Sit/Stand 1 person assist   Transfer Skill: Sit to Stand full weight-bearing   Lower Body Dressing   Level of [place]: Dress Lower Body stand-by assist   Physical Assist/Nonphysical Assist: Dress Lower Body supervision;set-up required;nonverbal cues (demo/gestures)   Grooming   Level of [place]: Grooming stand-by assist   Physical Assist/Nonphysical Assist: Grooming set-up required   Instrumental Activities of Daily Living (IADL)   Previous Responsibilities meal prep;housekeeping;laundry;shopping;finances;medication management;yardwork;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)   General Therapy Interventions   Planned Therapy Interventions ADL retraining;IADL retraining;strengthening;progressive activity/exercise;transfer training   Clinical Impression   Criteria for Skilled Therapeutic Interventions Met yes, treatment indicated   OT Diagnosis decreased ADL/IADL-I   Influenced by the following impairments pain, decreased strength, decreased flexibility, fatigue    Assessment of Occupational Performance 3-5 Performance Deficits   Identified Performance Deficits functional mobility, functional transfers, toileting, bathing, dressing, community mobility, home maintaining    Clinical Decision Making (Complexity) Low complexity   Therapy Frequency daily   Predicted Duration of Therapy Intervention (days/wks) 11/12/18   Anticipated Discharge Disposition Home with Home Therapy   Risks and Benefits of Treatment have been explained. Yes   Patient,  "Family & other staff in agreement with plan of care Yes   Clinical Impression Comments Patient presents today with deficits in functional mobility, functional transfers, toileting, dressing, bathing, and home maintaining all leading to decreased ADL-I. Patient to benefit from skilled instruction to address the following problem list.    [facility] AM-PAC  \"6 Clicks\" Daily Activity Inpatient Short Form   1. Putting on and taking off regular lower body clothing? 3 - A Little   2. Bathing (including washing, rinsing, drying)? 3 - A Little   3. Toileting, which includes using toilet, bedpan or urinal? 2 - A Lot   4. Putting on and taking off regular upper body clothing? 3 - A Little   5. Taking care of personal grooming such as brushing teeth? 4 - None   6. Eating meals? 4 - None   Daily Activity Raw Score (Score out of 24.Lower scores equate to lower levels of function) 19   Total Evaluation Time   Total Evaluation Time (Minutes) 6     "

## 2018-11-05 NOTE — CONSULTS
[facility]  Transplant Infectious Disease Consult Note - New Patient     Patient:  Jerad Ross  YOB: 1960  Medical record number: [id number]  Consult requested by Dr. Adi Shin for evaluation of infected intraspinous bursal cyst with paraspinous cellulitis in renal transplant patient          Assessment and Recommendations:   Recommendations:    - Please obtain MRSA nares.  - Trend CRP every other day times three with need for further trending to be reassessed on end date.  - Discontinue vancomycin and ertapenem.  - Start ceftriaxone at meningitic dosing 2 g IV BID for two days. After two days ceftriaxone at meningitic dosing, may reduce to ceftriaxone 2 g IV daily dosing.  - Will follow blood cultures along with you.  - Will discuss possibility of needle biopsy with neurosugery to obtain culture data for targeted antimicrobial therapy.  - Patient will likely require 3-6 weeks of antimicrobial therapy, with exact duration of therapy to be determined by level of suspicion for bony involvement.    Thank you very much for this consultation. Transplant Infectious Disease will continue to follow with you.    Assessment:    Infectious Disease issues include:    # Infected intraspinous bursal cyst with paraspinous cellulitis   MRI spine with evidence of infection in interspinous bursal cyst with reactive epidural thickening and paraspinous cellulitis without obvious drainable fluid. No leukocytosis (WBC 9.6), lactate 1.1, CRP elevated at 110-->160. Patient currently on vanc/ertapenem. As concern for true penicillin allergy is low in setting of possible childhood reaction and recent tolerance of cephalosporins, may discontinue vanc and ertapenem and transition to ceftriaxone. As current infection does not appear to invade CNS, would recommend short course at meningitic dosing to ensure adequate CNS penetration prior to transition to ceftriaxone 2 g daily dosing.  Blood cultures have been negative thus far. Ideally, would obtain culture data from needle biopsy of cyst to further target antimicrobial therapy. Will plan to discuss possibility of biopsy with neurosurgery.      - QTc interval: No recent EKG available   - Bacterial prophylaxis:  None   - Pneumocystis prophylaxis: None   - Fungal prophylaxis: None   - Isolation status: Good hand hygiene.      The above patient was seen and plan discussed with the attending, Dr. Frye.    Sheila Chavira  PGY-1, Internal Medicine  P: [phone]         History of Infectious Disease Illness:     Gael Ross is a 58-year-old male with a past medical history of ESRD (secondary to idiopathic FSGS status post  donor kidney transplant in  with subsequent rejection and retransplant in ) on chronic immunosuppression with mycophenolate and prednisone, chronic hepatitis B on entecavir, CAD, hypertension, avascular necrosis of bilateral hip replacements in  status post revision in  and  who presented with 2 days acute onset lower back pain with radiation to the bilateral hips that limited mobility, is worse with weightbearing, and improves with lying on his side.  Patient reports feeling hot and taking his temperature at home with low-grade temperatures of approximately 100.6.  Patient says that intense pain precipitated nausea and emesis.  He denies any fevers or chills.  He further denies any tingling, numbness, urinary or fecal incontinence.  Patient denies any recent infections or antibiotic use.  He does endorse multiple prior scratch wounds to the upper extremities.  He denies any known trauma to the back.  No recent dental disease. No recent IV drug or medication administration.    MRI obtained after admission was significant for an infection of the interspinous bursal cyst with reactive epidural thickening and paraspinous cellulitis.  The radiology report noted that there was no drainable fluid.  Patient was  started on vancomycin (11/4-) and ertapenem (11/4-) given multiple listed prior allergies to antimicrobials including penicillin and his chronically immunosuppressed state.  Patient was evaluated by neurosurgery with recommendations against surgical intervention at this time. Blood cultures obtained on admission are pending.        Transplants:  10/6/1993 (Kidney), 4/18/1978 (Kidney), Postoperative day:  9161.  Coordinator Vale Bennett    Review of Systems:   CONSTITUTIONAL:  Warmth, no fevers or chills  EYES: negative for icterus   ENT:  negative for acute hearing loss  RESPIRATORY:  negative for cough  CARDIOVASCULAR:  negative for chest pain  GASTROINTESTINAL:  negative for diarrhea  GENITOURINARY:  negative for dysuria  HEME:  Bleeding from excoriations  INTEGUMENT:  Stable skin lesions  NEURO:  No tingling, numbness, weakness    Past Medical History:   Diagnosis Date     AION (acute ischemic optic neuropathy)      Anemia in chronic renal disease      Avascular necrosis of bones of both hips (H)     s/p bilateral hip replacements     Basal cell carcinoma      Chronic hepatitis B (H)      Clostridium difficile colitis      Coronary artery disease involving native coronary artery of native heart without angina pectoris 6/17/2014    Coronary angiogram 6/17/14: Severe distal 3-vessel disease involving small vessels, not amenable to PCI or CABG.     CRP elevated      Depression      Diverticulosis      Dyslipidemia      FSGS (focal segmental glomerulosclerosis)      Gastric ulcer with hemorrhage 2/12/12     GERD (gastroesophageal reflux disease)      Glaucoma     OHTN     Hemorrhoids      Hypertension secondary to other renal disorders      Hypogonadism in male      Immunosuppressed status (H)      Kidney replaced by transplant     focal glomerulosclerosis      NSTEMI (non-ST elevated myocardial infarction) (H) 6/17/2014     JULIANE (obstructive sleep apnea)     Doesn't use CPAP     Paracentral scotoma      LE     Secondary renal hyperparathyroidism (H)      Squamous cell carcinoma        Past Surgical History:   Procedure Laterality Date     APPENDECTOMY       CATARACT IOL, RT/LT  4/19/2000    RE     CATARACT IOL, RT/LT  6/1/2000    LE     COLECTOMY SUBTOTAL  1983    10 cm, diverticulitis     COLONOSCOPY  2/13/2012    Procedure:COLONOSCOPY; Surgeon:SLOAN GALLARDO; Location: GI     ESOPHAGOSCOPY, GASTROSCOPY, DUODENOSCOPY (EGD), COMBINED  2/13/2012    Procedure:COMBINED ESOPHAGOSCOPY, GASTROSCOPY, DUODENOSCOPY (EGD); Surgeon:SLOAN GALLARDO; Location: GI     EXTRACAPSULAR CATARACT EXTRATION WITH INTRAOCULAR LENS IMPLANT  4-20-10, 6-1-10    Rt, Lt     HERNIA REPAIR  1995    Lt inguinal     HIP SURGERY      1981, bilat MITZI, revised 2001, 2005     KIDNEY SURGERY  1978 and 1993    transplant     KNEE SURGERY  1983, 1987    bilat TKA     MOHS MICROGRAPHIC PROCEDURE       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 Wife is also a kidney transplant recipient.     Works part-time     Social History   Substance Use Topics     Smoking status: Former Smoker     Packs/day: 1.00     Years: 9.00     Quit date: 1/1/1988     Smokeless tobacco: Former User     Alcohol use 0.0 oz/week     0 Standard drinks or equivalent per week      Comment: rarely 1 drink per month       Immunization History   Administered Date(s) Administered     HEPA 01/26/2009,  01/20/2016     Influenza (IIV3) PF 01/26/2009, 11/10/2010, 01/04/2012, 11/11/2013, 10/15/2015, 11/04/2016, 09/20/2017     Influenza Vaccine IM 3yrs+ 4 Valent IIV4 10/08/2014, 10/23/2018     Pneumo Conj 13-V (2010&after) 10/08/2014     Pneumococcal 23 valent 11/01/2005, 10/23/2018     Tdap (Adacel,Boostrix) 01/26/2009       Patient Active Problem List   Diagnosis     BCC (basal cell carcinoma), trunk     JULIANE (obstructive sleep apnea)     Hypertension secondary to other renal disorders     Coronary artery disease involving native coronary artery of native heart without angina pectoris     NSTEMI (non-ST elevated myocardial infarction) (H)     Depression     Diverticulosis     Hemorrhoids     Kidney replaced by transplant     Basal cell carcinoma     Squamous cell carcinoma     Dyslipidemia     CRP elevated     Glaucoma     AION (acute ischemic optic neuropathy)     Paracentral scotoma     Hip pain     Inflamed seborrheic keratosis     Intertrigo     Chronic hepatitis B (H)     Immunosuppressed status (H)     Hypogonadism in male     GERD (gastroesophageal reflux disease)     Aftercare following organ transplant     Acute midline low back pain without sciatica     Septic bursitis     Impaired mobility     Infection of lumbar spine (H)            Current Medications & Allergies:       amLODIPine  5 mg Oral Daily     aspirin  81 mg Oral Daily     atorvastatin  20 mg Oral Daily     docusate sodium  100 mg Oral BID     enoxaparin  40 mg Subcutaneous Q24H     entecavir  0.5 mg Oral Daily     ertapenem (INVanz) IV  1 g Intravenous Q24H     FLUoxetine  40 mg Oral QAM     isosorbide mononitrate  15 mg Oral Daily     latanoprost  1 drop Both Eyes At Bedtime     mycophenolate  750 mg Oral BID     omeprazole  20 mg Oral QAM     polyethylene glycol  17 g Oral Daily     predniSONE  5 mg Oral Daily     sodium chloride (PF)  3 mL Intracatheter Q8H     vancomycin (VANCOCIN) IV  1,500 mg Intravenous Q12H        Infusions/Drips:      Allergies   Allergen Reactions     Penicillins Shortness Of Breath and Hives     Keflex [Cephalexin Hcl] Other (See Comments)     Pt could not recall reaction     Tetracycline Other (See Comments)     Patient could not recall reaction     Sulfa Drugs Rash            Physical Exam:   Vitals were reviewed.  All vitals stable.  Patient Vitals for the past 24 hrs:   BP Temp Temp src Pulse Resp SpO2 Weight   11/05/18 1200 110/60 100.4  F (38  C) Oral 85 16 94 % -   11/05/18 1118 - - - - - - 77.8 kg (171 lb 8.3 oz)   11/05/18 0708 104/64 98.5  F (36.9  C) Oral - 16 91 % -   11/05/18 0416 108/63 97.3  F (36.3  C) Oral - 16 96 % -   11/04/18 2214 105/59 98.3  F (36.8  C) Oral 60 16 94 % -   11/04/18 1659 124/68 99.4  F (37.4  C) Oral 71 16 95 % 82.5 kg (181 lb 14.1 oz)   11/04/18 1600 121/75 - - - - 92 % -   11/04/18 1545 116/72 - - - - 93 % -   11/04/18 1530 117/70 - - - - 93 % -   11/04/18 1515 108/68 - - - - 90 % -   11/04/18 1500 112/71 - - - - 94 % -   11/04/18 1445 107/71 - - - - - -   11/04/18 1430 102/72 - - - - 99 % -   11/04/18 1415 111/75 - - - - 91 % -   11/04/18 1400 115/72 - - - - - -     Ranges for vital signs:  Temp:  [97.3  F (36.3  C)-100.4  F (38  C)] 100.4  F (38  C)  Pulse:  [60-85] 85  Heart Rate:  [60] 60  Resp:  [16] 16  BP: (102-124)/(59-75) 110/60  SpO2:  [90 %-99 %] 94 %  Vitals:    11/04/18 1046 11/04/18 1659 11/05/18 1118   Weight: 79.4 kg (175 lb) 82.5 kg (181 lb 14.1 oz) 77.8 kg (171 lb 8.3 oz)       Physical Examination:  GENERAL:  well-developed, well-nourished, in bed in no acute distress.  HEAD:  Head is normocephalic, atraumatic   EYES:  Eyes have anicteric sclerae without conjunctival injection   ENT:  Oropharynx is moist without exudates or ulcers. Tongue is midline  NECK:  Supple.   LUNGS:  Clear to auscultation bilaterally from the anterior without wheezes, crackles, or rhonchi  CARDIOVASCULAR:  Regular rate and rhythm with no murmurs, gallops or  rubs.  ABDOMEN:  Normal bowel sounds, soft, nontender.   MSK: No tenderness to palpation of spine or paraspinal regions. No palpable masses. No overlying erythema or obvious abrasions.  SKIN:  Diffuse skin lesions throughout on exposed skin on upper and lower extremities. No apparent erythema, fluctuant lesions, or induration.  Line in place without any surrounding erythema or exudate.  NEUROLOGIC:  Grossly nonfocal. Equal movement in all four extremities.         Laboratory Data:     Absolute CD4   Date Value Ref Range Status   12/18/2008 898 mm3 Final       Inflammatory Markers  Recent Labs   Lab Test  11/05/18   0739  11/04/18   1214  11/07/17   1207  10/08/14   0835  10/07/14   0733  10/06/14   0820   10/03/14   1513   SED  48*  18  27*   --    --    --    --   52*   CRP  160.0*  110.0*  10.3*  71.2*  90.8*  128.0*   < >  148.0*    < > = values in this interval not displayed.       Immune Globulin Studies   No lab results found.    Metabolic Studies     Recent Labs   Lab Test  11/05/18   0739  11/04/18   2020  11/04/18   1214  10/23/18   1116   10/04/14   0734   02/12/12   1850   NA  139   --   140  138   < >  137   < >  138   POTASSIUM  3.6   --   3.3*  3.7   < >  3.7   < >  3.9   CHLORIDE  107   --   107  103   < >  105   < >  106   CO2  24   --   27  28   < >  25   < >  21   ANIONGAP  8   --   6  7   < >  7   < >  11   BUN  11   --   10  9   < >  9   < >  16   CR  0.69  0.75  0.69  0.83   < >  0.84   < >  0.85   GFRESTIMATED  >90  >90  >90  >90   < >  >90  Non  GFR Calc     < >  >90   GLC  80   --   77  82   < >  95   < >  71   HEMA  8.4*   --   8.3*  9.2   < >  8.7   < >  9.3   PHOS  2.9   --    --    --    --    --    < >   --    MAG  1.7   --    --    --    < >   --    < >   --    LACT   --    --   1.1   --    --    --    --   1.1   PCAL   --    --    --    --    --   0.06   --    --    CKT   --    --    --   86   --    --    --    --     < > = values in this interval not displayed.        Hepatic Studies  Recent Labs   Lab Test  10/23/18   1116  03/13/18   0934  09/12/17   0923   06/10/14   1044   BILITOTAL  0.8  0.5  0.9   < >  1.2   BILIDELTA   --    --    --    --   0.1   BILICONJ   --    --    --    --   0.0   DBIL  0.2  0.1  0.2   < >   --    ALKPHOS  87  77  85   < >  81   PROTTOTAL  7.4  6.8  7.2   < >  7.2   ALBUMIN  3.5  3.2*  3.3*   < >  3.9   AST  20  24  20   < >  38   ALT  22  27  24   < >  36    < > = values in this interval not displayed.       Pancreatitis testing  Recent Labs   Lab Test  10/23/18   1116 02/12/12   1850   LIPASE   --    --   94   TRIG  97   < >   --     < > = values in this interval not displayed.       Gout Labs    No lab results found.    Hematology Studies    Recent Labs   Lab Test  11/05/18   0739   11/04/18   1214  10/23/18   1116  06/21/18   1154  03/13/18   0934  09/12/17   0923   WBC  9.6   --   9.6  7.8  8.1  8.5  7.3   ANEU  6.9   --   6.2   --    --    --    --    ALYM  1.5   --   1.7   --    --    --    --    ALISIA  1.1   --   1.7*   --    --    --    --    AEOS  0.1   --   0.1   --    --    --    --    HGB  11.7*   --   12.5*  14.0  12.9*  12.8*  12.5*   HCT  38.4*   --   40.4  45.0  40.9  41.8  38.2*   PLT  307   < >  307  352  332  343  338    < > = values in this interval not displayed.       Clotting Studies    Recent Labs   Lab Test  03/13/18   0934  09/12/17   0923  03/10/17   1014  08/30/16   1042   10/03/14   1513   INR  0.95  0.86  0.97  0.99   < >  0.99   PTT   --    --    --    --    --   23    < > = values in this interval not displayed.       Iron Testing  Recent Labs   Lab Test  11/05/18   0739   MCV  92       Markers  Alpha Fetoprotein   Date Value Ref Range Status   07/08/2015 3.8 0 - 8 ug/L Final       Thyroid Studies     Recent Labs   Lab Test  11/07/17   1207  05/06/15   1505  02/08/11   1022   TSH  1.73  0.56  3.54       Urine Studies   Recent Labs   Lab Test  11/04/18   2128  01/05/11   0916   URINEPH  6.5  6.0   NITRITE   Negative  Negative   LEUKEST  Negative  Negative   WBCU  1  0       Medication levels  Recent Labs   Lab Test  06/21/18   1154  09/12/17   0923  10/05/14   0403   VANCOMYCIN   --    --   14.6   CYCLSP  <25*   --    --    MPACID   --   3.82*   --    MPAG   --   69.3   --        Last Culture results with specimen source  Culture Micro   Date Value Ref Range Status   11/04/2018 No growth after 16 hours  Preliminary   11/04/2018 No growth after 16 hours  Preliminary   05/24/2015   Final    No Salmonella, Shigella, Campylobacter, E. coli O157, Aeromonas, or Plesiomonas   isolated.     10/06/2014 No growth  Final   10/06/2014 No growth  Final   10/04/2014 No growth  Final   10/04/2014 No growth  Final   10/03/2014 No growth  Final   10/03/2014   Final    Cancelled by lab - Specimen never received. Notified Naomi Singh RN (5B) @ 723.    10/05/14     10/03/2014 (A)  Final    Cultured on the 1st day of incubation: Corynebacterium species  Critical Value/Significant Value, preliminary result only, called to and read   back by Chang King RN on 5B@1831 on [id number]      07/08/2005 No growth  Final   07/08/2005 No anaerobes isolated  Final   06/01/2005 No growth  Final   06/01/2005 No anaerobes isolated  Final    Specimen Description   Date Value Ref Range Status   11/04/2018 Blood Left Arm  Final   11/04/2018 Blood Right Hand  Final   04/10/2018 Nasal  Final   02/15/2018 Nasal  Final     Comment:     Swab   05/24/2015 Feces  Final   05/24/2015 Feces  Final   10/07/2014 Urine  Final   10/06/2014 Blood Right Hand  Final   10/06/2014 Blood Right Hand  Final   10/04/2014 Blood Right Hand  Final   10/04/2014 Blood Right Arm  Final   10/03/2014 Blood Right Arm  Final   10/03/2014 Blood  Final   10/03/2014 Blood Right Hand  Final   02/13/2012 Plasma  Final   07/08/2005 Other LEFT FEMORAL CANAL  Final   07/08/2005 Other LEFT FEMORAL CANAL  Final   07/08/2005 Other LEFT FEMORAL CANAL  Final   06/01/2005 Left Hip  Final   06/01/2005  Left Hip  Final   06/01/2005 Left Hip  Final        Last check of C difficile  C Diff Toxin B PCR   Date Value Ref Range Status   05/24/2015 (A) NEG Final    Formed stools are not acceptable, by national guidelines, for the detection of   Clostridium difficile toxin and culture.   Canceled, Test credited  SPOKE WITH ESMER JACKSON.       Quantiferon testing   Recent Labs   Lab Test  08/30/16   1043   TBRSLT  Negative   TBAGN  0.00       Virology:  CMV viral loads    Recent Labs   Lab Test  09/12/17   0923  02/14/12   0556   CSPEC  Plasma, EDTA anticoagulant  Whole blood, EDTA anticoagulant   CMVLOG  Not Calculated   --        Log IU/mL of CMVQNT   Date Value Ref Range Status   09/12/2017 Not Calculated <2.1 [Log_IU]/mL Final       EBV DNA Copies/mL   Date Value Ref Range Status   08/02/2017 EBV DNA Not Detected EBVNEG [Copies]/mL Final       Hepatitis B Testing   Recent Labs   Lab Test  06/10/14   1044  09/10/13   1025  05/23/13   0855  01/05/11   0909   HBSAB   --    --    --   0.0   HBCAB   --    --    --   Positive*   HEPBANG   --   Positive*   --   Positive*   HBEABY   --   Positive Reference range: Negative (Note) The anti-HBe is repeatedly reactive, which is consistent with recent or remote Hepatitis B infection. Anti-HBe can be present in a small proportion of chronic HBV infections, but its presence usually indicates resolution. If HBeAg is also positive, this may represent the transition between the appearance of anti-HBe and decline of HBeAg, and retesting in one month may be useful. Performed by [facility], [address] [facility],[place] [id number] [phone] [email or web address], Villa Chandra MD, Lab. Director*   --   Positive   HBEAGN   --   Negative Reference range: Negative (Note) Performed by [facility], [address] [facility] [place] [address] [phone] [email or web address], Villa Chandra MD, Lab. Director   --   Negative   HBQTT  Not Quantified  Unit: IU/mL    32  470,000 Unit: IU/mL  14,000      Hepatitis C  Antibody   Date Value Ref Range Status   12/18/2008 Negative NEG Final       CMV IgG Antibody   Date Value Ref Range Status   02/14/2012 <0.20  Negative for anti-CMV IgG U/mL Final     CMV IgM Antibody   Date Value Ref Range Status   02/14/2012 <8.00  No detectable antibody. AU/mL Final     Imaging:  Recent Results (from the past 48 hour(s))   MR Lumbar Spine w/o & w Contrast    Narrative    MR LUMBAR SPINE W/O & W CONTRAST 11/4/2018 1:46 PM    Provided History: lumbar back pain radiating to left hip, fevers,  immune suppressed, rule out osteomyelitis versus abscess; .    Comparison: Abdominal MRI 7/13/2015    Technique: Sagittal T1-weighted and T2-weighted and axial T2-weighted  images of the lumbar spine were obtained without intravenous contrast.  Post intravenous contrast using gadolinium axial and sagittal  T1-weighted images were obtained with fat saturation.    Contrast: 7.9CC GADAVIST     Findings:   5 lumbar-type vertebrae counting down from the presumed 12th ribs. The  tip the conus medullaris is at L1-2. Cauda equina nerve roots and  visualized thoracic cord are normal.    Asymmetric left facet joint effusions and interspinous edema with 2 mm  dorsal epidural bursal cyst formation. There is abnormal enhancing  edema within the paraspinous musculature bilaterally at L2-pelvis. No  discrete drainable fluid collection. Abnormal epidural  thickening/enhancement L4-S1. No abnormal intrathecal enhancement.    Normal alignment of the lumbar vertebrae. Normal lumbar lordosis. Disc  degeneration at L1-2 with loss of disc height and intradiscal T2  signal. Severe asymmetric right psoas muscular atrophy. Multiple cm  nonenhancing well-circumscribed heterogeneous hemorrhagic signal focus  in the right perivertebral space in the expected location of the right  psoas muscle at the level of L5 measuring up to 2.7 cm in maximal  dimension, unchanged dating back to at least 7/13/2015, probable  chronic organized hematoma.      Findings on a level by level basis are as follows:    T12-L1: No spinal canal or neural foraminal stenosis.    L1-2: Disc bulge. Mild spinal canal stenosis. No significant neural  foraminal stenosis.    L2-3: No spinal canal or neural foraminal stenosis.    L3-4: Disc bulge. Mild bilateral neural foraminal stenosis. No  significant spinal canal stenosis.    L4-5: Disc bulge and facet arthrosis. Moderate bilateral neural  foraminal stenosis. No significant spinal canal stenosis.    L5-S1: Facet arthrosis. No spinal canal or neural foraminal stenosis.    Atrophic kidneys.      Impression    Impression:  1. Findings suspicious for infected L4-5 interspinous bursal cyst with  adjacent reactive epidural thickening and paraspinous cellulitis. No  drainable fluid collection.  2. Multilevel lumbar spondylosis. Moderate bilateral neural foraminal  stenosis at L4-5. Mild spinal canal stenosis at L1-2.  3. Multiple well-circumscribed hemorrhagic foci in the right  perivertebral soft tissues at L5 in the location of a severely  atrophied right psoas muscle, unchanged dating back to at least  7/13/2015, suspected chronic organized hematoma.    [Result: Infected interspinous bursal cyst]    Finding was identified on 11/4/2018 1:37 PM.     Dr. Han was contacted by Dr. Carpenter at 11/4/2018 1:56 PM and  verbalized understanding of the urgent finding.     ARVIN CARPENTER MD

## 2018-11-05 NOTE — PROGRESS NOTES
"SPIRITUAL HEALTH SERVICES  SPIRITUAL ASSESSMENT Progress Note  [facility] ([place]C     REFERRAL SOURCE: Hospital  Request    I met with Jerad Ross and his wife to introduce myself and explain the role of spiritual health services. Jerad identifies with a \"Jewish, Christian\" Catholic rasheed but also has Baptist roots. He feels this makes his rasheed richer and stronger. He is a member of a local rasheed community and his  has been notified that Jerad is at [facility]. Jerad describes his rasheed as \"experienced based\" but also an intellectual pursuit. He continues to grow in his rasheed every day. He is aware of how to request additional  visits if he desires one.    PLAN: I will monitor needs through IDT and remain available to offer spiritual support.    Lorin Hayes  Oncology   Pager [phone]    "

## 2018-11-05 NOTE — PROGRESS NOTES
S: low back pain, otherwise doing well    O:  Exam:  General: Awake;  Alert, In No Acute Distress  Pulm: Breathing Comfortably on room air  Mental status: Oriented x 3  Cranial Nerves: Cranial Nerves II-XII Intact Bilaterally  Strength:      Del Tr Bi WE WF Gr  R 5 5 5 5 5 5  L 5 5 5 5 5 5     HF KE KF DF PF EHL  R 5 5 5 5 5 5  L 5 5 5 5 5 5    Pronator Drift: Absent  Sensory: Intact to Light Touch  Reflexes: No Hyperreflexia, Farnsworth s or Clonus Present; Toes Down-Going Bilaterally    Assessment:   # # L4-5 interspinous bursal cyst, possibly infected    Recommendations:   - No neurosurgical intervention indicated at this time   - Please continue with antibiotics and current cares per primary team.      Neurosurgery will sign-off at this time.      Please contact neurosurgery resident on call with questions.    Dial * * [phone], enter [phone] when prompted.     Adalid Ordaz MD, PhD  Neurosurgery PGY-3

## 2018-11-06 ENCOUNTER — APPOINTMENT (OUTPATIENT)
Dept: OCCUPATIONAL THERAPY | Facility: CLINIC | Age: 58
DRG: 558 | End: 2018-11-06
Payer: MEDICARE

## 2018-11-06 ENCOUNTER — APPOINTMENT (OUTPATIENT)
Dept: PHYSICAL THERAPY | Facility: CLINIC | Age: 58
DRG: 558 | End: 2018-11-06
Payer: MEDICARE

## 2018-11-06 LAB
ANION GAP SERPL CALCULATED.3IONS-SCNC: 7 MMOL/L (ref 3–14)
BASOPHILS # BLD AUTO: 0 10E9/L (ref 0–0.2)
BASOPHILS NFR BLD AUTO: 0.1 %
BUN SERPL-MCNC: 13 MG/DL (ref 7–30)
CALCIUM SERPL-MCNC: 8.7 MG/DL (ref 8.5–10.1)
CHLORIDE SERPL-SCNC: 108 MMOL/L (ref 94–109)
CO2 SERPL-SCNC: 27 MMOL/L (ref 20–32)
CREAT SERPL-MCNC: 0.76 MG/DL (ref 0.66–1.25)
CRP SERPL-MCNC: 150 MG/L (ref 0–8)
DIFFERENTIAL METHOD BLD: ABNORMAL
EOSINOPHIL # BLD AUTO: 0.1 10E9/L (ref 0–0.7)
EOSINOPHIL NFR BLD AUTO: 2.1 %
ERYTHROCYTE [DISTWIDTH] IN BLOOD BY AUTOMATED COUNT: 15.3 % (ref 10–15)
GFR SERPL CREATININE-BSD FRML MDRD: >90 ML/MIN/1.7M2
GLUCOSE SERPL-MCNC: 91 MG/DL (ref 70–99)
HCT VFR BLD AUTO: 38.4 % (ref 40–53)
HGB BLD-MCNC: 11.6 G/DL (ref 13.3–17.7)
IMM GRANULOCYTES # BLD: 0 10E9/L (ref 0–0.4)
IMM GRANULOCYTES NFR BLD: 0 %
LYMPHOCYTES # BLD AUTO: 1.9 10E9/L (ref 0.8–5.3)
LYMPHOCYTES NFR BLD AUTO: 28 %
MCH RBC QN AUTO: 28.2 PG (ref 26.5–33)
MCHC RBC AUTO-ENTMCNC: 30.2 G/DL (ref 31.5–36.5)
MCV RBC AUTO: 93 FL (ref 78–100)
MONOCYTES # BLD AUTO: 0.8 10E9/L (ref 0–1.3)
MONOCYTES NFR BLD AUTO: 11.4 %
NEUTROPHILS # BLD AUTO: 3.9 10E9/L (ref 1.6–8.3)
NEUTROPHILS NFR BLD AUTO: 58.4 %
NRBC # BLD AUTO: 0 10*3/UL
NRBC BLD AUTO-RTO: 0 /100
PLATELET # BLD AUTO: 315 10E9/L (ref 150–450)
POTASSIUM SERPL-SCNC: 3.6 MMOL/L (ref 3.4–5.3)
RBC # BLD AUTO: 4.11 10E12/L (ref 4.4–5.9)
SODIUM SERPL-SCNC: 142 MMOL/L (ref 133–144)
WBC # BLD AUTO: 6.7 10E9/L (ref 4–11)

## 2018-11-06 PROCEDURE — 97535 SELF CARE MNGMENT TRAINING: CPT | Mod: GO

## 2018-11-06 PROCEDURE — 80048 BASIC METABOLIC PNL TOTAL CA: CPT | Performed by: INTERNAL MEDICINE

## 2018-11-06 PROCEDURE — 25000132 ZZH RX MED GY IP 250 OP 250 PS 637: Mod: GY | Performed by: STUDENT IN AN ORGANIZED HEALTH CARE EDUCATION/TRAINING PROGRAM

## 2018-11-06 PROCEDURE — 25000128 H RX IP 250 OP 636: Performed by: EMERGENCY MEDICINE

## 2018-11-06 PROCEDURE — 25000132 ZZH RX MED GY IP 250 OP 250 PS 637: Mod: GY | Performed by: FAMILY MEDICINE

## 2018-11-06 PROCEDURE — A9270 NON-COVERED ITEM OR SERVICE: HCPCS | Mod: GY | Performed by: FAMILY MEDICINE

## 2018-11-06 PROCEDURE — 25000131 ZZH RX MED GY IP 250 OP 636 PS 637: Mod: GY | Performed by: FAMILY MEDICINE

## 2018-11-06 PROCEDURE — 40000193 ZZH STATISTIC PT WARD VISIT: Performed by: PHYSICAL THERAPIST

## 2018-11-06 PROCEDURE — 86140 C-REACTIVE PROTEIN: CPT | Performed by: INTERNAL MEDICINE

## 2018-11-06 PROCEDURE — 97535 SELF CARE MNGMENT TRAINING: CPT | Mod: GP | Performed by: PHYSICAL THERAPIST

## 2018-11-06 PROCEDURE — 80180 DRUG SCRN QUAN MYCOPHENOLATE: CPT | Performed by: INTERNAL MEDICINE

## 2018-11-06 PROCEDURE — A9270 NON-COVERED ITEM OR SERVICE: HCPCS | Mod: GY | Performed by: STUDENT IN AN ORGANIZED HEALTH CARE EDUCATION/TRAINING PROGRAM

## 2018-11-06 PROCEDURE — 25000128 H RX IP 250 OP 636: Performed by: FAMILY MEDICINE

## 2018-11-06 PROCEDURE — 97116 GAIT TRAINING THERAPY: CPT | Mod: GP | Performed by: PHYSICAL THERAPIST

## 2018-11-06 PROCEDURE — 12000001 ZZH R&B MED SURG/OB UMMC

## 2018-11-06 PROCEDURE — 40000133 ZZH STATISTIC OT WARD VISIT

## 2018-11-06 PROCEDURE — 36415 COLL VENOUS BLD VENIPUNCTURE: CPT | Performed by: INTERNAL MEDICINE

## 2018-11-06 PROCEDURE — 25000125 ZZHC RX 250: Performed by: FAMILY MEDICINE

## 2018-11-06 PROCEDURE — 85025 COMPLETE CBC W/AUTO DIFF WBC: CPT | Performed by: INTERNAL MEDICINE

## 2018-11-06 PROCEDURE — 87799 DETECT AGENT NOS DNA QUANT: CPT | Performed by: INTERNAL MEDICINE

## 2018-11-06 PROCEDURE — 97530 THERAPEUTIC ACTIVITIES: CPT | Mod: GO

## 2018-11-06 PROCEDURE — 97110 THERAPEUTIC EXERCISES: CPT | Mod: GO

## 2018-11-06 RX ORDER — CEFTRIAXONE 2 G/1
2 INJECTION, POWDER, FOR SOLUTION INTRAMUSCULAR; INTRAVENOUS EVERY 12 HOURS
Status: DISCONTINUED | OUTPATIENT
Start: 2018-11-06 | End: 2018-11-09

## 2018-11-06 RX ADMIN — OXYCODONE HYDROCHLORIDE 10 MG: 5 TABLET ORAL at 13:14

## 2018-11-06 RX ADMIN — DIPHENHYDRAMINE HYDROCHLORIDE 25 MG: 25 CAPSULE ORAL at 14:30

## 2018-11-06 RX ADMIN — Medication 15 MG: at 07:39

## 2018-11-06 RX ADMIN — PREDNISONE 5 MG: 5 TABLET ORAL at 07:39

## 2018-11-06 RX ADMIN — DOCUSATE SODIUM 100 MG: 100 CAPSULE, LIQUID FILLED ORAL at 19:25

## 2018-11-06 RX ADMIN — MYCOPHENOLATE MOFETIL 750 MG: 250 CAPSULE ORAL at 19:25

## 2018-11-06 RX ADMIN — CEFTRIAXONE SODIUM 2 G: 2 INJECTION, POWDER, FOR SOLUTION INTRAMUSCULAR; INTRAVENOUS at 11:33

## 2018-11-06 RX ADMIN — MYCOPHENOLATE MOFETIL 750 MG: 250 CAPSULE ORAL at 07:38

## 2018-11-06 RX ADMIN — OXYCODONE HYDROCHLORIDE 10 MG: 5 TABLET ORAL at 08:43

## 2018-11-06 RX ADMIN — VANCOMYCIN HYDROCHLORIDE 1500 MG: 10 INJECTION, POWDER, LYOPHILIZED, FOR SOLUTION INTRAVENOUS at 03:50

## 2018-11-06 RX ADMIN — ASPIRIN 81 MG CHEWABLE TABLET 81 MG: 81 TABLET CHEWABLE at 07:38

## 2018-11-06 RX ADMIN — ENTECAVIR 0.5 MG: 0.5 TABLET ORAL at 07:39

## 2018-11-06 RX ADMIN — POLYETHYLENE GLYCOL 3350 17 G: 17 POWDER, FOR SOLUTION ORAL at 07:38

## 2018-11-06 RX ADMIN — OMEPRAZOLE 20 MG: 20 CAPSULE, DELAYED RELEASE ORAL at 07:38

## 2018-11-06 RX ADMIN — LATANOPROST 1 DROP: 50 SOLUTION OPHTHALMIC at 19:25

## 2018-11-06 RX ADMIN — DOCUSATE SODIUM 100 MG: 100 CAPSULE, LIQUID FILLED ORAL at 07:38

## 2018-11-06 RX ADMIN — OXYCODONE HYDROCHLORIDE 10 MG: 5 TABLET ORAL at 21:26

## 2018-11-06 RX ADMIN — ATORVASTATIN CALCIUM 20 MG: 20 TABLET, FILM COATED ORAL at 07:38

## 2018-11-06 RX ADMIN — AMLODIPINE BESYLATE 5 MG: 5 TABLET ORAL at 07:38

## 2018-11-06 RX ADMIN — FLUOXETINE HYDROCHLORIDE 40 MG: 20 CAPSULE ORAL at 07:38

## 2018-11-06 ASSESSMENT — ACTIVITIES OF DAILY LIVING (ADL)
ADLS_ACUITY_SCORE: 11

## 2018-11-06 ASSESSMENT — PAIN DESCRIPTION - DESCRIPTORS
DESCRIPTORS: ACHING

## 2018-11-06 NOTE — PLAN OF CARE
Problem: Patient Care Overview  Goal: Plan of Care/Patient Progress Review  AVSS. Pt c/o lower back pain after working with therapy, PRN oxycodone and tylenol given, relief reported. Pt denies nausea. Tolerating regular diet. Abx infusing via PIV. MRSA sample collected and sent to lab, pt now on contact precautions. Pt up with SBA. Voiding adequately. Last BM 11/4, declined miralax until IV abx done infusing. C/o itchiness, MD notified, benadryl ordered. Continue POC.

## 2018-11-06 NOTE — PLAN OF CARE
Problem: Patient Care Overview  Goal: Plan of Care/Patient Progress Review  Discharge Planner OT   Patient plan for discharge: Pt would like to discharge to TCU.   Current status: Pt ambulated with SBA to and from bathroom today, SBA for walk-in shower transfer. Pt was able to complete bathing task while seated on shower chair, sit<>stands in shower with support of grab bars. Pt needed Mychal for lower body dressing and education on AE such as reacher and sock aid. Pt did well with using log roll technique and maintaining preventative spinal precautions 2/2 cyst on lumbar spine.   Barriers to return to prior living situation: deconditioning, no assist available at home, fatigue, spinal precautions  Recommendations for discharge: If pt had assist pt would likely be safe to d/c home. However, pt reports his wife has her own medical issues and would likely not be able to help pt at home. Pt also has IV abx, would benefit from short TCU stay to increase functional strength and independence with ADLs/IADLs and learn how to manage IV abx.   Rationale for recommendations: See above.        Entered by: Elis Bradford 11/06/2018 10:16 AM

## 2018-11-06 NOTE — PROGRESS NOTES
[facility], [facility] Progress Note - [place]'s Service   Main Plans for Today   - Switch antibiotics from vanco and ertapenem to ceftriaxone per transplant ID recs  - Consult IR for possible biopsy/aspiration of cyst    Assessment & Plan   Jerad Ross is a 58 year old male admitted on 2018. He has a history of  donor renal transplant, skin cancer, chronic back pain, chronic anemia, vitamin D deficiency, essential hypertension, coronary artery disease and is admitted for L4-L5 infected interspinous bursal cyst with adjacent reactive epidural thickening and paraspinous cellulitis     # Acute back pain  # Infected L4-5 interspinous bursal cyst  # Paraspinous cellulitis  Cyst is most likely causing patient's pain and is likely infected. CRP continues to be elevated , suggesting an inflammatory process, but it is improved from , no evidence for sepsis as he is afebrile and WBC is normal. Neurosurgery evaluated, no indication for surgical intervention at this point and they will not perform biopsy of the area. Ortho spine also curbsided and don't think they could biopsy/aspirate the cyst either. Will consult IR for biopsy/aspiration. Transplant ID following given his history of renal transplant and immunosuppression for help with antibiotics and other management. MRSA nares swab negative but does not exclude MRSA as cause for abscess. Symptoms are improving - pain reduced and able to get out of bed and walk with less pain.  Neurosurgery signed off, no surgical interventions at this time and no biopsy by their team  - Consult IR for biopsy/aspiration for cell count, culture, gram stain, pathology  - Transplant ID following, appreciate recs  --- Discontinue vancomycin and ertapenem  --- Start ceftriaxone at meningitic dosing 2 g IV BID x2 days, then ceftriaxone 2g IV daily for total of 3-6 weeks of antibiotics (on day 3)  --- Recheck CRP on  to  follow per ID recs  - IV pain control  - Probiotic to decrease risk of antibiotic associated diarrhea and C. Diff infection  - Avoid ibuprofen/NSAIDS given renal transplant     # DDKT (Baseline creatinine 0.8-1.1)  Cr has been normal at 0.69-0.75 while in hospital.  - Consulted transplant nephrology, appreciate recs  - Continue CellCept at current dose per nephrology because dose is minimal, await transplant ID recs  - Continue PTA prednisone 5mg daily, consider stress dose steroids if signs of sepsis or hypotension  - Avoid nephrotoxic agents     # Chronic Hepatitis B  Continue PTA entecavir     # Major Depressive Disorder  Continue PTA Fluoxetine     # CAD  # HTN  Continue PTA IMDUR, Plavix, ASA 81, Amlodipine, lipitor     # Restrictive pattern on PFTs  PFTs showing mildly restrictive pattern, seen by pulmonology who said it could be mild asthma.  - Continue PTA advair, PRN albuterol    # Pain Assessment:  Current Pain Score 11/6/2018   Patient currently in pain? yes   Pain score (0-10) -   Pain location Back   Pain descriptors Aching   - Jerad is experiencing pain due to lumbar intraspinous septic bursitis. Pain management was discussed and the plan was created in a collaborative fashion.  Jerad's response to the current recommendations: engaged  - Please see the plan for pain management as documented above    Diet: Regular Diet Adult  Fluids: PO  DVT Prophylaxis: Lovenox  Code Status: Full Code    Disposition Plan   Expected discharge: 2 - 3 days, recommended to prior living arrangement once adequate pain management/ tolerating PO medications, antibiotic plan established, safe disposition plan/ TCU bed available and SIRS/Sepsis treated. Dispo: Expected Discharge Date: 11/07/18        Entered: Kaelyn Salguero 11/06/2018, 4:17 PM   Information in the above section will display in the discharge planner report.      The patient's care was discussed with the Attending Physician, Dr. Villanueva.    Kaelyn MONTES  Noemy MS3  [facility]  Pager: [phone]  Please see sticky note for cross cover information    Hospital Course  Jerad Ross is a 58 year old male with a history of  donor renal transplant (baseline Cr 0.8-1.1), HTN, CAD, chronic cough, anemia in chronic renal disease, vitamin D deficiency, chronic hepatitis B, basal cell carcinoma, squamous cell skin carcinoma, who was admitted 2018 for acute mid-lumbar back pain, L>R, which developed 36 hours PTA and progressively worsened. He also had a fever up to 100.8. Lumbar spine MRI showed L4-5 interspinous bursal cyst with adjacent reactive epidural thickening and paraspinous cellulitis. Neurosurgery consulted, recommended no surgical intervention at this time, will continue to watch while on antibiotics. Currently on vancomycin and ertapenem.     Interval History   Feeling well this morning. Sitting up on edge of bed, eating breakfast. Has some pain with sitting but not as bad as when he was first admitted. Walked twice yesterday, once with PT and once with OT, which went well. He had pain in his back with walking but no weakness, numbness, or tingling in the legs or feet. Pain does not radiate to his hips or legs. He would like to have a more definitive diagnosis prior to creating a solid treatment plan. He is worried this is related to his prior orthopedic issues, including avascular necrosis of bilateral hips. No nausea, vomiting, shortness of breath, headache, cough, or other symptoms.    Physical Exam   Vital Signs: Temp: 97.9  F (36.6  C) Temp src: Oral BP: 125/82 Pulse: 76 Heart Rate: 60 Resp: 16 SpO2: 92 % O2 Device: None (Room air)    Weight: 173 lbs 8 oz  General: sitting up on edge of bed, appears slightly uncomfortable  HEENT: normocephalic, atraumatic  Respiratory: lungs clear to auscultation bilaterally, no wheezes, rhonchi, or rales  Cardiac: regular rate and rhythm, S1/S2 heard, no murmurs,  rubs, or gallops  Abdominal: non-distended, normal bowel sounds, soft, non-tender to palpation in all quadrants  Musculoskeletal: no tenderness to palpation of lumbar spinal region of back  Skin: many brown, raised, ~2 mm lesions throughout all exposed skin areas  Neurologic: cranial nerves grossly intact  Psychiatric: appropriate mood and affect    Data     Recent Labs  Lab 11/06/18  0647 11/05/18  0739 11/04/18  2020 11/04/18  1214   WBC 6.7 9.6  --  9.6   HGB 11.6* 11.7*  --  12.5*   MCV 93 92  --  92    307 280 307    139  --  140   POTASSIUM 3.6 3.6  --  3.3*   CHLORIDE 108 107  --  107   CO2 27 24  --  27   BUN 13 11  --  10   CR 0.76 0.69 0.75 0.69   ANIONGAP 7 8  --  6   HEMA 8.7 8.4*  --  8.3*   GLC 91 80  --  77       No results found for this or any previous visit (from the past 24 hour(s)).  Medications       amLODIPine  5 mg Oral Daily     aspirin  81 mg Oral Daily     atorvastatin  20 mg Oral Daily     cefTRIAXone  2 g Intravenous Q12H     docusate sodium  100 mg Oral BID     entecavir  0.5 mg Oral Daily     FLUoxetine  40 mg Oral QAM     isosorbide mononitrate  15 mg Oral Daily     latanoprost  1 drop Both Eyes At Bedtime     mycophenolate  750 mg Oral BID     omeprazole  20 mg Oral QAM     polyethylene glycol  17 g Oral Daily     predniSONE  5 mg Oral Daily     sodium chloride (PF)  3 mL Intracatheter Q8H     Resident/Fellow Attestation   I, Tree Stahl, was present with the medical student who participated in the service and in the documentation of the note.  I have verified the history and personally performed the physical exam and medical decision making.  I agree with the assessment and plan of care as documented in the note.      Tree Stahl DO, MA  Family Medicine PGY-2  [facility], [facility]

## 2018-11-06 NOTE — PLAN OF CARE
Problem: Patient Care Overview  Goal: Plan of Care/Patient Progress Review  Outcome: Improving  AVSS. Denies pain and n/v. PIV SL. Tolerating regular diet. Up SBA. Voiding adequately. MRSA sample collected, results pending. Contact precautions maintained. Pt expressed concerns regarding desire to vote while in the hospital. Please consult with social work in the AM to see if a resolution can be made. Clustered cares per pt request. Resting comfortably for duration of shift. Continue POC.     Addendum: MRSA results negative. Isolation precautions discontinued.

## 2018-11-06 NOTE — PLAN OF CARE
Problem: Patient Care Overview  Goal: Plan of Care/Patient Progress Review  Outcome: No Change  VSS. No complaints of pain or nausea. PIV saline locked between antibiotics. Up with SBA. Voiding adequate amounts. Pt expressed concerns regarding desire to vote while in the hospital. Please consult with social work in the AM to see if a resolution can be made. Pt resting comfortably. Continue POC.

## 2018-11-06 NOTE — PROGRESS NOTES
Social Work Services Progress Note    Hospital Day: 2   Date of Initial Social Work Evaluation:  Not completed  Collaborated with:  Pt at bedside    Data:  SW involved for discharge planning - TCU recommended.    Intervention:  SW met with Pt and OT at bedside to discuss discharge planning. Pt expresses concerns of discharging home with IV antibiotics initially as well as OT recommendation for increase functional strength and independence with ADLs/IADLs from a short TCU stay. Pt lives in an apartment with his wife (who also has medical issues). Patient's wife is unable to assist with ADL's/IADLs at home therefore recommending short TCU stay. SW provided list of medicare certified TCUs and reviewed Pt preferences. Also, SW reviewed financial coverage with patient as well as SNF coverage and potential copays following day 20. Patient expressed understanding and is agreeable.     As Pt requested, TCU referrals sent to:  -[facility]  -[facility]  -[facility]    Assessment:  Pt was plesant and open to SW visit to discuss discharge planning. Pt reports he has stayed in TCU previously when having several knee replacements, Pt was unable to recall previous TCUs and information not found in chart review. Pt expressed no additional questions or concerns. Patient is alert and oriented x3 and manages his own affairs.     Plan:    Anticipated Disposition:  Facility:  [facility]    Barriers to d/c plan:  Rehab placement and medical stability    Follow Up:  SW to f/u & assist as needed.    ASAD Lopez, NAMAN   Surgical/Oncology Unit   Phone: [phone]  Pager: [phone]

## 2018-11-06 NOTE — PLAN OF CARE
Problem: Pain, Acute (Adult)  Goal: Identify Related Risk Factors and Signs and Symptoms  Related risk factors and signs and symptoms are identified upon initiation of Human Response Clinical Practice Guideline (CPG).   Outcome: Improving  VSS. Pt up with SBA. Ambulated in halls. Voids spont with adequate UOP. Passing gas, BM X1. Pain controlled with Oxycodone X2. Benadryl given X1 for itching. PIV saline locked between IV ABX. Tolerating regular diet. Cont. POC.

## 2018-11-06 NOTE — PLAN OF CARE
Problem: Patient Care Overview  Goal: Plan of Care/Patient Progress Review  Pt ambulates with cane, CGA for 400'. Pt very focused on balance and not able to handle any balance challenges.  Pt reports that walking distracts him from back pain but once sitting in room, he states pain is higher than before.      Discharge Planner PT   Patient plan for discharge: TCU  Current status: Pt is performing bed mobility and transfers at [facility], gait with cane is CGA. Pt is going long distance but unsteady after this distance he has more pain and declined exercise in room.  Barriers to return to prior living situation: acute on chronic pain limiting activity tolerance to activity, fall risk as dynamic balance testing could not be performed due to pt's feeling that he needed to focus on gait  Recommendations for discharge: TCU  Rationale for recommendations: Pt to work on strengthening, endurance, balance for decreased risk of fall and greater independence at home.       Entered by: Lina Soler 11/06/2018 2:42 PM

## 2018-11-07 ENCOUNTER — APPOINTMENT (OUTPATIENT)
Dept: PHYSICAL THERAPY | Facility: CLINIC | Age: 58
DRG: 558 | End: 2018-11-07
Payer: MEDICARE

## 2018-11-07 ENCOUNTER — APPOINTMENT (OUTPATIENT)
Dept: OCCUPATIONAL THERAPY | Facility: CLINIC | Age: 58
DRG: 558 | End: 2018-11-07
Payer: MEDICARE

## 2018-11-07 LAB
ANION GAP SERPL CALCULATED.3IONS-SCNC: 9 MMOL/L (ref 3–14)
BUN SERPL-MCNC: 15 MG/DL (ref 7–30)
CALCIUM SERPL-MCNC: 8.8 MG/DL (ref 8.5–10.1)
CHLORIDE SERPL-SCNC: 104 MMOL/L (ref 94–109)
CO2 SERPL-SCNC: 28 MMOL/L (ref 20–32)
CREAT SERPL-MCNC: 0.69 MG/DL (ref 0.66–1.25)
EBV DNA # SPEC NAA+PROBE: NORMAL {COPIES}/ML
EBV DNA SPEC NAA+PROBE-LOG#: NORMAL {LOG_COPIES}/ML
GFR SERPL CREATININE-BSD FRML MDRD: >90 ML/MIN/1.7M2
GLUCOSE SERPL-MCNC: 82 MG/DL (ref 70–99)
MYCOPHENOLATE SERPL LC/MS/MS-MCNC: 0.55 MG/L (ref 1–3.5)
MYCOPHENOLATE-G SERPL LC/MS/MS-MCNC: 29.5 MG/L (ref 30–95)
PLATELET # BLD AUTO: 350 10E9/L (ref 150–450)
POTASSIUM SERPL-SCNC: 3.7 MMOL/L (ref 3.4–5.3)
SODIUM SERPL-SCNC: 140 MMOL/L (ref 133–144)
TME LAST DOSE: ABNORMAL H

## 2018-11-07 PROCEDURE — 25000132 ZZH RX MED GY IP 250 OP 250 PS 637: Mod: GY | Performed by: STUDENT IN AN ORGANIZED HEALTH CARE EDUCATION/TRAINING PROGRAM

## 2018-11-07 PROCEDURE — A9270 NON-COVERED ITEM OR SERVICE: HCPCS | Mod: GY | Performed by: STUDENT IN AN ORGANIZED HEALTH CARE EDUCATION/TRAINING PROGRAM

## 2018-11-07 PROCEDURE — 36415 COLL VENOUS BLD VENIPUNCTURE: CPT | Performed by: STUDENT IN AN ORGANIZED HEALTH CARE EDUCATION/TRAINING PROGRAM

## 2018-11-07 PROCEDURE — 25000131 ZZH RX MED GY IP 250 OP 636 PS 637: Mod: GY | Performed by: FAMILY MEDICINE

## 2018-11-07 PROCEDURE — 85049 AUTOMATED PLATELET COUNT: CPT | Performed by: STUDENT IN AN ORGANIZED HEALTH CARE EDUCATION/TRAINING PROGRAM

## 2018-11-07 PROCEDURE — 97110 THERAPEUTIC EXERCISES: CPT | Mod: GP | Performed by: REHABILITATION PRACTITIONER

## 2018-11-07 PROCEDURE — 25000128 H RX IP 250 OP 636: Performed by: FAMILY MEDICINE

## 2018-11-07 PROCEDURE — 25000125 ZZHC RX 250: Performed by: FAMILY MEDICINE

## 2018-11-07 PROCEDURE — 25000132 ZZH RX MED GY IP 250 OP 250 PS 637: Mod: GY | Performed by: FAMILY MEDICINE

## 2018-11-07 PROCEDURE — 40000193 ZZH STATISTIC PT WARD VISIT: Performed by: REHABILITATION PRACTITIONER

## 2018-11-07 PROCEDURE — 12000001 ZZH R&B MED SURG/OB UMMC

## 2018-11-07 PROCEDURE — 80048 BASIC METABOLIC PNL TOTAL CA: CPT | Performed by: STUDENT IN AN ORGANIZED HEALTH CARE EDUCATION/TRAINING PROGRAM

## 2018-11-07 PROCEDURE — 97116 GAIT TRAINING THERAPY: CPT | Mod: GP | Performed by: REHABILITATION PRACTITIONER

## 2018-11-07 PROCEDURE — 97112 NEUROMUSCULAR REEDUCATION: CPT | Mod: GP | Performed by: REHABILITATION PRACTITIONER

## 2018-11-07 PROCEDURE — 97530 THERAPEUTIC ACTIVITIES: CPT | Mod: GO | Performed by: OCCUPATIONAL THERAPIST

## 2018-11-07 PROCEDURE — A9270 NON-COVERED ITEM OR SERVICE: HCPCS | Mod: GY | Performed by: FAMILY MEDICINE

## 2018-11-07 PROCEDURE — 40000133 ZZH STATISTIC OT WARD VISIT: Performed by: OCCUPATIONAL THERAPIST

## 2018-11-07 RX ADMIN — OMEPRAZOLE 20 MG: 20 CAPSULE, DELAYED RELEASE ORAL at 08:36

## 2018-11-07 RX ADMIN — OXYCODONE HYDROCHLORIDE 10 MG: 5 TABLET ORAL at 11:56

## 2018-11-07 RX ADMIN — MYCOPHENOLATE MOFETIL 750 MG: 250 CAPSULE ORAL at 08:36

## 2018-11-07 RX ADMIN — DIPHENHYDRAMINE HYDROCHLORIDE 25 MG: 25 CAPSULE ORAL at 16:22

## 2018-11-07 RX ADMIN — Medication 15 MG: at 08:36

## 2018-11-07 RX ADMIN — CEFTRIAXONE SODIUM 2 G: 2 INJECTION, POWDER, FOR SOLUTION INTRAMUSCULAR; INTRAVENOUS at 11:50

## 2018-11-07 RX ADMIN — ATORVASTATIN CALCIUM 20 MG: 20 TABLET, FILM COATED ORAL at 08:36

## 2018-11-07 RX ADMIN — FLUOXETINE HYDROCHLORIDE 40 MG: 20 CAPSULE ORAL at 08:36

## 2018-11-07 RX ADMIN — CEFTRIAXONE SODIUM 2 G: 2 INJECTION, POWDER, FOR SOLUTION INTRAMUSCULAR; INTRAVENOUS at 00:12

## 2018-11-07 RX ADMIN — OXYCODONE HYDROCHLORIDE 10 MG: 5 TABLET ORAL at 08:36

## 2018-11-07 RX ADMIN — LATANOPROST 1 DROP: 50 SOLUTION OPHTHALMIC at 21:26

## 2018-11-07 RX ADMIN — DOCUSATE SODIUM 100 MG: 100 CAPSULE, LIQUID FILLED ORAL at 21:25

## 2018-11-07 RX ADMIN — AMLODIPINE BESYLATE 5 MG: 5 TABLET ORAL at 08:36

## 2018-11-07 RX ADMIN — OXYCODONE HYDROCHLORIDE 5 MG: 5 TABLET ORAL at 21:26

## 2018-11-07 RX ADMIN — ACETAMINOPHEN 650 MG: 325 TABLET, FILM COATED ORAL at 21:26

## 2018-11-07 RX ADMIN — MYCOPHENOLATE MOFETIL 500 MG: 250 CAPSULE ORAL at 21:25

## 2018-11-07 RX ADMIN — ASPIRIN 81 MG CHEWABLE TABLET 81 MG: 81 TABLET CHEWABLE at 08:36

## 2018-11-07 RX ADMIN — ENTECAVIR 0.5 MG: 0.5 TABLET ORAL at 08:36

## 2018-11-07 RX ADMIN — PREDNISONE 5 MG: 5 TABLET ORAL at 08:36

## 2018-11-07 RX ADMIN — MYCOPHENOLATE MOFETIL 250 MG: 250 CAPSULE ORAL at 21:30

## 2018-11-07 RX ADMIN — DOCUSATE SODIUM 100 MG: 100 CAPSULE, LIQUID FILLED ORAL at 08:36

## 2018-11-07 RX ADMIN — ACETAMINOPHEN 650 MG: 325 TABLET, FILM COATED ORAL at 11:56

## 2018-11-07 RX ADMIN — ACETAMINOPHEN 650 MG: 325 TABLET, FILM COATED ORAL at 08:36

## 2018-11-07 RX ADMIN — POLYETHYLENE GLYCOL 3350 17 G: 17 POWDER, FOR SOLUTION ORAL at 08:36

## 2018-11-07 ASSESSMENT — PAIN DESCRIPTION - DESCRIPTORS
DESCRIPTORS: DULL;SHARP
DESCRIPTORS: SORE
DESCRIPTORS: ACHING;SORE
DESCRIPTORS: SORE
DESCRIPTORS: ACHING;SORE

## 2018-11-07 ASSESSMENT — ACTIVITIES OF DAILY LIVING (ADL)
ADLS_ACUITY_SCORE: 11

## 2018-11-07 NOTE — PLAN OF CARE
Problem: Patient Care Overview  Goal: Plan of Care/Patient Progress Review  Outcome: Improving  AOx4, able to make needs known. Up with SBA. AVSS on ra. Tolerating reg diet, passing flatus, BMx1 on days. Denies nausea. Pain managed with PRN oxy x1, with relief. AUO. PIV in rt hand saline locked. Pt resting comfortably. Cont POC.

## 2018-11-07 NOTE — PLAN OF CARE
Problem: Patient Care Overview  Goal: Plan of Care/Patient Progress Review  Outcome: Improving  Patient reports good pain control with PO Tylenol and Oxycodone. Neuros remain intact. Tolerating regular diet this AM, NPO since 8AM. Reports passing flatus and last BM 11/6. Voiding with good output. PIV saline locked between antibiotics. Up in halls and room with SBA. Using IS independently. Plan for L4-5 abscess aspiration and biopsies in IR possibly today. Continue with POC.

## 2018-11-07 NOTE — PROGRESS NOTES
[facility], [facility] Progress Note - [place]'s Service   Main Plans for Today   - Aspiration/biopsy of L4-5 bursal cyst with IR    Assessment & Plan   Jerad oRss is a 58 year old male admitted on 2018. He has a history of  donor renal transplant, skin cancer, chronic back pain, chronic anemia, vitamin D deficiency, essential hypertension, coronary artery disease and is admitted for L4-L5 infected interspinous bursal cyst with adjacent reactive epidural thickening and paraspinous cellulitis     # Acute back pain  # Infected L4-5 interspinous bursal cyst  # Paraspinous cellulitis  Cyst is most likely causing patient's pain and is likely infected. CRP continues to be elevated , suggesting an inflammatory process, but it is improved from , no evidence for sepsis as he is afebrile and WBC is normal. IR consulted for aspiration/biopsy. Transplant ID following given his history of renal transplant and immunosuppression for help with antibiotics and other management. MRSA nares swab negative but does not exclude MRSA as cause for abscess. Symptoms are improving - pain reduced and able to get out of bed and walk with less pain.  Neurosurgery signed off, no surgical interventions at this time and no biopsy by their team  - Consulted IR for biopsy/aspiration for cell count, culture, gram stain, pathology - will go to IR suite for procedure today  - Transplant ID following, appreciate recs  --- Continue ceftriaxone at meningitic dosing 2 g IV BID x1 more day, then ceftriaxone 2g IV daily for total of 3-6 weeks of antibiotics (on day 3)  --- Recheck CRP on  to follow per ID recs  - PO pain control  - Probiotic to decrease risk of antibiotic associated diarrhea and C. Diff infection  - Avoid ibuprofen/NSAIDS given renal transplant    # Crackles on lung exam  Likely atelectasis due to lying in hospital bed. No fever, leukocytosis, or cough to suggest a  pneumonia.  - Incentive spirometry  - Encourage frequent walking as able     # DDKT (Baseline creatinine 0.8-1.1)  Cr has been normal at 0.69-0.75 while in hospital.  - Consulted transplant nephrology, appreciate recs  - Continue CellCept at current dose per nephrology because dose is minimal, await transplant ID recs  - Continue PTA prednisone 5mg daily, consider stress dose steroids if signs of sepsis or hypotension  - Avoid nephrotoxic agents     # Chronic Hepatitis B  Continue PTA entecavir     # Major Depressive Disorder  Continue PTA Fluoxetine     # CAD  # HTN  Continue PTA IMDUR, Plavix, ASA 81, Amlodipine, lipitor     # Restrictive pattern on PFTs  PFTs showing mildly restrictive pattern, seen by pulmonology who said it could be mild asthma.  - Continue PTA advair, PRN albuterol    # Pain Assessment:  Current Pain Score 11/7/2018   Patient currently in pain? yes   Pain score (0-10) -   Pain location Back   Pain descriptors Sore   - Jerad is experiencing pain due to lumbar intraspinous septic bursitis. Pain management was discussed and the plan was created in a collaborative fashion.  Jerad's response to the current recommendations: engaged  - Please see the plan for pain management as documented above    Diet: Regular Diet Adult  Fluids: PO  DVT Prophylaxis: Lovenox (held for IR procedure currently)  Code Status: Full Code    Disposition Plan   Expected discharge: 2 - 3 days, recommended to prior living arrangement once adequate pain management/ tolerating PO medications, antibiotic plan established, safe disposition plan/ TCU bed available and SIRS/Sepsis treated. Dispo: Expected Discharge Date: 11/07/18        Entered: Kaelyn Salguero 11/07/2018, 7:36 AM   Information in the above section will display in the discharge planner report.      The patient's care was discussed with the Attending Physician, Dr. Villanueva.    Kaelyn Salguero, MS3  [facility]  Medicine  Pager: [phone]  Please see sticky note for cross cover information    Hospital Course  Jerad Ross is a 58 year old male with a history of  donor renal transplant (baseline Cr 0.8-1.1), HTN, CAD, chronic cough, anemia in chronic renal disease, vitamin D deficiency, chronic hepatitis B, basal cell carcinoma, squamous cell skin carcinoma, who was admitted 2018 for acute mid-lumbar back pain, L>R, which developed 36 hours PTA and progressively worsened. He also had a fever up to 100.8. Lumbar spine MRI showed L4-5 interspinous bursal cyst with adjacent reactive epidural thickening and paraspinous cellulitis. Neurosurgery consulted, recommended no surgical intervention at this time. IR consulted for biopsy/aspiration of the cyst to clarify diagnosis. Currently on ceftriaxone per transplant ID recommendations.    Interval History   Feeling well today. Pain in back is about the same as yesterday. Walking with PT and OT has been going well. He tried using a cane yesterday which helped him. He has been walking around the room, as well. No shortness of breath or other symptoms. Having small BMs, not feeling constipated.    Physical Exam   Vital Signs: Temp: 98.7  F (37.1  C) Temp src: Oral BP: 122/74 Pulse: 55 Heart Rate: 52 Resp: 16 SpO2: 94 % O2 Device: None (Room air)    Weight: 173 lbs 8 oz  General: sitting up on edge of bed, appears slightly uncomfortable  HEENT: normocephalic, atraumatic  Respiratory: lungs with mild crackles in the bases bilaterally, no wheezes  Cardiac: regular rate and rhythm, S1/S2 heard, no murmurs, rubs, or gallops  Abdominal: non-distended, normal bowel sounds, soft, non-tender to palpation in all quadrants  Musculoskeletal: no tenderness to palpation of lumbar spinal region of back  Skin: many brown, raised, ~2 mm lesions throughout all exposed skin areas  Neurologic: cranial nerves grossly intact  Psychiatric: appropriate mood and affect    Data     Recent Labs  Lab  11/06/18  0647 11/05/18  0739 11/04/18  2020 11/04/18  1214   WBC 6.7 9.6  --  9.6   HGB 11.6* 11.7*  --  12.5*   MCV 93 92  --  92    307 280 307    139  --  140   POTASSIUM 3.6 3.6  --  3.3*   CHLORIDE 108 107  --  107   CO2 27 24  --  27   BUN 13 11  --  10   CR 0.76 0.69 0.75 0.69   ANIONGAP 7 8  --  6   HEMA 8.7 8.4*  --  8.3*   GLC 91 80  --  77       No results found for this or any previous visit (from the past 24 hour(s)).  Medications       amLODIPine  5 mg Oral Daily     aspirin  81 mg Oral Daily     atorvastatin  20 mg Oral Daily     cefTRIAXone  2 g Intravenous Q12H     docusate sodium  100 mg Oral BID     entecavir  0.5 mg Oral Daily     FLUoxetine  40 mg Oral QAM     isosorbide mononitrate  15 mg Oral Daily     latanoprost  1 drop Both Eyes At Bedtime     mycophenolate  750 mg Oral BID     omeprazole  20 mg Oral QAM     polyethylene glycol  17 g Oral Daily     predniSONE  5 mg Oral Daily     sodium chloride (PF)  3 mL Intracatheter Q8H     I was present with the medical student who participated in the service and in the documentation of this note. I have verified the history and personally performed the physical exam and medical decision making, and have verified the content of the note, which accurately reflects my assessment of the patient and the plan of care.   MD Adi Ariza MD  Chief Resident  [facility]  [phone]

## 2018-11-07 NOTE — CONSULTS
Patient is on Neuroradiology [id number]/7/2018   for a L4L5 septic bursa cyst aspirate/tissue culture.   Consent will be done prior to procedure.     Please contact the IR charge RN at [phone] for estimated time of procedure.     Regina Hager IR RPA  [phone] Call pager  [phone] pager

## 2018-11-07 NOTE — PLAN OF CARE
Problem: Patient Care Overview  Goal: Plan of Care/Patient Progress Review  Discharge Planner OT   Patient plan for discharge: TCU  Current status: Pt required SBA for mobility, tub/shower transfer and to maintain spinal precautions. Pt fatigued quickly with activity, but VSS throughout.   Barriers to return to prior living situation: spinal precautions, activity tolerance, pain  Recommendations for discharge: TCU  Rationale for recommendations: to increase ADL I and tolerance.        Entered by: Rosalino Pickard 11/07/2018 11:45 AM

## 2018-11-07 NOTE — PLAN OF CARE
Problem: Patient Care Overview  Goal: Plan of Care/Patient Progress Review  Discharge Planner PT   Patient plan for discharge: TCU since wife has chronic illness pt must be indep and good activity tolerance to return home.  Current status: Pt is performing bed mobility and transfers at SBA, gait with cane is CGA and SBA with walker.   Recommend walker for gait in halls outside of therapy sessions as pt less painful, walks much faster and more steady.  Barriers to return to prior living situation: acute on chronic pain limiting activity tolerance to activity, fall risk, wife unable to provide physical assistance  Recommendations for discharge: TCU  Rationale for recommendations: Pt to work on strengthening, endurance, balance for decreased risk of fall and greater independence at home.

## 2018-11-07 NOTE — PLAN OF CARE
Problem: Patient Care Overview  Goal: Plan of Care/Patient Progress Review  Outcome: Improving  Patient sleeping between cares overnight.  Up in room with stand-by assist and a cane.  Neuros remain intact.  Complains of mild lower back pain; reports adequate pain relief with PRN oxycodone, but declined analgesics overnight.  Voiding adequate amounts, urinal at bedside.  Passing flatus, last BM was yesterday.  Tolerating regular diet.  PIV saline-locked between antibiotics.    IR consult to be completed today.

## 2018-11-08 ENCOUNTER — APPOINTMENT (OUTPATIENT)
Dept: OCCUPATIONAL THERAPY | Facility: CLINIC | Age: 58
DRG: 558 | End: 2018-11-08
Payer: MEDICARE

## 2018-11-08 ENCOUNTER — APPOINTMENT (OUTPATIENT)
Dept: GENERAL RADIOLOGY | Facility: CLINIC | Age: 58
DRG: 558 | End: 2018-11-08
Attending: RADIOLOGY PRACTITIONER ASSISTANT
Payer: MEDICARE

## 2018-11-08 LAB
ANION GAP SERPL CALCULATED.3IONS-SCNC: 8 MMOL/L (ref 3–14)
BASOPHILS # BLD AUTO: 0 10E9/L (ref 0–0.2)
BASOPHILS NFR BLD AUTO: 0.5 %
BUN SERPL-MCNC: 14 MG/DL (ref 7–30)
CALCIUM SERPL-MCNC: 8.8 MG/DL (ref 8.5–10.1)
CHLORIDE SERPL-SCNC: 105 MMOL/L (ref 94–109)
CO2 SERPL-SCNC: 30 MMOL/L (ref 20–32)
CREAT SERPL-MCNC: 0.74 MG/DL (ref 0.66–1.25)
CRP SERPL-MCNC: 52 MG/L (ref 0–8)
DIFFERENTIAL METHOD BLD: ABNORMAL
EOSINOPHIL # BLD AUTO: 0.3 10E9/L (ref 0–0.7)
EOSINOPHIL NFR BLD AUTO: 5.1 %
ERYTHROCYTE [DISTWIDTH] IN BLOOD BY AUTOMATED COUNT: 15.3 % (ref 10–15)
GFR SERPL CREATININE-BSD FRML MDRD: >90 ML/MIN/1.7M2
GLUCOSE BLDC GLUCOMTR-MCNC: 83 MG/DL (ref 70–99)
GLUCOSE SERPL-MCNC: 59 MG/DL (ref 70–99)
GRAM STN SPEC: NORMAL
GRAM STN SPEC: NORMAL
HCT VFR BLD AUTO: 39 % (ref 40–53)
HGB BLD-MCNC: 12.1 G/DL (ref 13.3–17.7)
IMM GRANULOCYTES # BLD: 0 10E9/L (ref 0–0.4)
IMM GRANULOCYTES NFR BLD: 0.2 %
LYMPHOCYTES # BLD AUTO: 2 10E9/L (ref 0.8–5.3)
LYMPHOCYTES NFR BLD AUTO: 30.6 %
MCH RBC QN AUTO: 28.5 PG (ref 26.5–33)
MCHC RBC AUTO-ENTMCNC: 31 G/DL (ref 31.5–36.5)
MCV RBC AUTO: 92 FL (ref 78–100)
MONOCYTES # BLD AUTO: 0.7 10E9/L (ref 0–1.3)
MONOCYTES NFR BLD AUTO: 10.2 %
NEUTROPHILS # BLD AUTO: 3.6 10E9/L (ref 1.6–8.3)
NEUTROPHILS NFR BLD AUTO: 53.4 %
NRBC # BLD AUTO: 0 10*3/UL
NRBC BLD AUTO-RTO: 0 /100
PLATELET # BLD AUTO: 342 10E9/L (ref 150–450)
POTASSIUM SERPL-SCNC: 3.6 MMOL/L (ref 3.4–5.3)
RBC # BLD AUTO: 4.25 10E12/L (ref 4.4–5.9)
SODIUM SERPL-SCNC: 144 MMOL/L (ref 133–144)
SPECIMEN SOURCE: NORMAL
WBC # BLD AUTO: 6.6 10E9/L (ref 4–11)

## 2018-11-08 PROCEDURE — 62272 THER SPI PNXR DRG CSF: CPT

## 2018-11-08 PROCEDURE — 36415 COLL VENOUS BLD VENIPUNCTURE: CPT | Performed by: STUDENT IN AN ORGANIZED HEALTH CARE EDUCATION/TRAINING PROGRAM

## 2018-11-08 PROCEDURE — 25000132 ZZH RX MED GY IP 250 OP 250 PS 637: Mod: GY | Performed by: STUDENT IN AN ORGANIZED HEALTH CARE EDUCATION/TRAINING PROGRAM

## 2018-11-08 PROCEDURE — 25000128 H RX IP 250 OP 636: Performed by: FAMILY MEDICINE

## 2018-11-08 PROCEDURE — 97535 SELF CARE MNGMENT TRAINING: CPT | Mod: GO

## 2018-11-08 PROCEDURE — 12000001 ZZH R&B MED SURG/OB UMMC

## 2018-11-08 PROCEDURE — 009U3ZZ DRAINAGE OF SPINAL CANAL, PERCUTANEOUS APPROACH: ICD-10-PCS | Performed by: RADIOLOGY

## 2018-11-08 PROCEDURE — A9270 NON-COVERED ITEM OR SERVICE: HCPCS | Mod: GY | Performed by: FAMILY MEDICINE

## 2018-11-08 PROCEDURE — 40000133 ZZH STATISTIC OT WARD VISIT

## 2018-11-08 PROCEDURE — 87205 SMEAR GRAM STAIN: CPT | Performed by: STUDENT IN AN ORGANIZED HEALTH CARE EDUCATION/TRAINING PROGRAM

## 2018-11-08 PROCEDURE — A9270 NON-COVERED ITEM OR SERVICE: HCPCS | Mod: GY | Performed by: STUDENT IN AN ORGANIZED HEALTH CARE EDUCATION/TRAINING PROGRAM

## 2018-11-08 PROCEDURE — 00000146 ZZHCL STATISTIC GLUCOSE BY METER IP

## 2018-11-08 PROCEDURE — 25000132 ZZH RX MED GY IP 250 OP 250 PS 637: Mod: GY | Performed by: FAMILY MEDICINE

## 2018-11-08 PROCEDURE — 25000125 ZZHC RX 250: Performed by: FAMILY MEDICINE

## 2018-11-08 PROCEDURE — 80048 BASIC METABOLIC PNL TOTAL CA: CPT | Performed by: STUDENT IN AN ORGANIZED HEALTH CARE EDUCATION/TRAINING PROGRAM

## 2018-11-08 PROCEDURE — 86140 C-REACTIVE PROTEIN: CPT | Performed by: STUDENT IN AN ORGANIZED HEALTH CARE EDUCATION/TRAINING PROGRAM

## 2018-11-08 PROCEDURE — 87070 CULTURE OTHR SPECIMN AEROBIC: CPT | Performed by: STUDENT IN AN ORGANIZED HEALTH CARE EDUCATION/TRAINING PROGRAM

## 2018-11-08 PROCEDURE — 25000131 ZZH RX MED GY IP 250 OP 636 PS 637: Mod: GY | Performed by: FAMILY MEDICINE

## 2018-11-08 PROCEDURE — 97530 THERAPEUTIC ACTIVITIES: CPT | Mod: GO

## 2018-11-08 PROCEDURE — 85025 COMPLETE CBC W/AUTO DIFF WBC: CPT | Performed by: STUDENT IN AN ORGANIZED HEALTH CARE EDUCATION/TRAINING PROGRAM

## 2018-11-08 RX ADMIN — ACETAMINOPHEN 650 MG: 325 TABLET, FILM COATED ORAL at 11:01

## 2018-11-08 RX ADMIN — DOCUSATE SODIUM 100 MG: 100 CAPSULE, LIQUID FILLED ORAL at 10:56

## 2018-11-08 RX ADMIN — MYCOPHENOLATE MOFETIL 750 MG: 250 CAPSULE ORAL at 20:21

## 2018-11-08 RX ADMIN — ATORVASTATIN CALCIUM 20 MG: 20 TABLET, FILM COATED ORAL at 10:56

## 2018-11-08 RX ADMIN — CEFTRIAXONE SODIUM 2 G: 2 INJECTION, POWDER, FOR SOLUTION INTRAMUSCULAR; INTRAVENOUS at 12:39

## 2018-11-08 RX ADMIN — MYCOPHENOLATE MOFETIL 750 MG: 250 CAPSULE ORAL at 10:56

## 2018-11-08 RX ADMIN — Medication 15 MG: at 10:57

## 2018-11-08 RX ADMIN — OXYCODONE HYDROCHLORIDE 10 MG: 5 TABLET ORAL at 11:02

## 2018-11-08 RX ADMIN — POLYETHYLENE GLYCOL 3350 17 G: 17 POWDER, FOR SOLUTION ORAL at 11:02

## 2018-11-08 RX ADMIN — LIDOCAINE HYDROCHLORIDE 10 ML: 10 INJECTION, SOLUTION EPIDURAL; INFILTRATION; INTRACAUDAL; PERINEURAL at 10:32

## 2018-11-08 RX ADMIN — PREDNISONE 5 MG: 5 TABLET ORAL at 10:57

## 2018-11-08 RX ADMIN — CEFTRIAXONE SODIUM 2 G: 2 INJECTION, POWDER, FOR SOLUTION INTRAMUSCULAR; INTRAVENOUS at 00:52

## 2018-11-08 RX ADMIN — FLUOXETINE HYDROCHLORIDE 40 MG: 20 CAPSULE ORAL at 10:57

## 2018-11-08 RX ADMIN — DOCUSATE SODIUM 100 MG: 100 CAPSULE, LIQUID FILLED ORAL at 20:21

## 2018-11-08 RX ADMIN — OMEPRAZOLE 20 MG: 20 CAPSULE, DELAYED RELEASE ORAL at 10:57

## 2018-11-08 RX ADMIN — ASPIRIN 81 MG CHEWABLE TABLET 81 MG: 81 TABLET CHEWABLE at 10:57

## 2018-11-08 RX ADMIN — ENTECAVIR 0.5 MG: 0.5 TABLET ORAL at 10:56

## 2018-11-08 RX ADMIN — DIPHENHYDRAMINE HYDROCHLORIDE 25 MG: 25 CAPSULE ORAL at 21:21

## 2018-11-08 RX ADMIN — AMLODIPINE BESYLATE 5 MG: 5 TABLET ORAL at 10:56

## 2018-11-08 RX ADMIN — OXYCODONE HYDROCHLORIDE 10 MG: 5 TABLET ORAL at 18:45

## 2018-11-08 RX ADMIN — LATANOPROST 1 DROP: 50 SOLUTION OPHTHALMIC at 20:21

## 2018-11-08 ASSESSMENT — ACTIVITIES OF DAILY LIVING (ADL)
ADLS_ACUITY_SCORE: 11

## 2018-11-08 ASSESSMENT — PAIN DESCRIPTION - DESCRIPTORS
DESCRIPTORS: ACHING;STABBING
DESCRIPTORS: ACHING;SORE

## 2018-11-08 NOTE — PROGRESS NOTES
[facility]  Transplant Infectious Disease Progress Note     Patient:  Jerad Ross  YOB: 1960  Medical record number: [id number]           Assessment and Recommendations:   Recommendations:    - May reduce ceftriaxone.dose frequency to 2 g q 24 hours on 11/9.  - Will follow cyst aspiration results along with you.    Thank you very much for this consultation. Transplant Infectious Disease will continue to follow with you.    Assessment:  # Infected intraspinous bursal cyst with paraspinous cellulitis   MRI spine with evidence of infection in interspinous bursal cyst with reactive epidural thickening and paraspinous cellulitis without obvious drainable fluid. No leukocytosis (WBC 9.6), lactate 1.1, CRP elevated at 110-->52. Patient initially on vanc/ertapenem with low concern for true penicillin allergy. Patient successfully transitioned to ceftriaxone without complication. Current infection does not appear to penetrate CNS, however, short course of ceftriaxone at meningitic dosing recommended to ensure adequate CNS penetration prior to transition to ceftriaxone 2 g daily dosing. May transition to ceftriaxone 2 g q 24 hours on 11/9. Cyst aspiration performed by IR on 11/8 with culture and gram stain pending.    Infectious Disease issues include:    - QTc interval: No recent EKG available   - Bacterial prophylaxis: On ceftriaxone as above.  - Pneumocystis prophylaxis: None   - Fungal prophylaxis: None   - Isolation status: Good hand hygiene.     The above patient was seen and plan discussed with the attending, Dr. Frye.    Sheila Chavira  PGY-1, Internal Medicine  P: [id number]    Interval History:    No acute events overnight. Cyst aspiration performed 11/8. Patient reports mild soreness at aspiration site. Otherwise denies any new symptoms. Patient has remained afebrile with Tmax 98.2 over the last 24 hours. CRP continues to downtrend, now 52 from 110. Blood cultures  remain negative on day 4.          History of Infectious Disease Illness: (from consult note )     Gael Ross is a 58-year-old male with a past medical history of ESRD (secondary to idiopathic FSGS status post  donor kidney transplant in  with subsequent rejection and retransplant in ) on chronic immunosuppression with mycophenolate and prednisone, chronic hepatitis B on entecavir, CAD, hypertension, avascular necrosis of bilateral hip replacements in  status post revision in  and  who presented with 2 days acute onset lower back pain with radiation to the bilateral hips that limited mobility, is worse with weightbearing, and improves with lying on his side.  Patient reports feeling hot and taking his temperature at home with low-grade temperatures of approximately 100.6.  Patient says that intense pain precipitated nausea and emesis.  He denies any fevers or chills.  He further denies any tingling, numbness, urinary or fecal incontinence.  Patient denies any recent infections or antibiotic use.  He does endorse multiple prior scratch wounds to the upper extremities.  He denies any known trauma to the back.  No recent dental disease. No recent IV drug or medication administration.     MRI obtained after admission was significant for an infection of the interspinous bursal cyst with reactive epidural thickening and paraspinous cellulitis.  The radiology report noted that there was no drainable fluid.  Patient was started on vancomycin (-) and ertapenem (-) given multiple listed prior allergies to antimicrobials including penicillin and his chronically immunosuppressed state.  Patient was evaluated by neurosurgery with recommendations against surgical intervention at this time. Blood cultures obtained on admission are pending.    Transplants:  10/6/1993 (Kidney), 1978 (Kidney), Postoperative day:  9164.  Coordinator Vale Bennett    Review of  Systems:  CONSTITUTIONAL:  No fevers or chills   EYES: negative for icterus or acute vision changes   ENT:  negative for acute hearing loss, tinnitus, sore throat   RESPIRATORY:  negative for cough, sputum or dyspnea   CARDIOVASCULAR:  negative for chest pain, palpitations   GASTROINTESTINAL:  negative for nausea, vomiting, diarrhea or constipation   GENITOURINARY:  negative for dysuria or hematuria   HEME:  No easy bruising or bleeding   INTEGUMENT:  Baseline pruritus with occasional healing erythematous patch and abrasion scattered throughout  NEURO:  Negative for headache or tremor     Past Medical History:   Diagnosis Date     AION (acute ischemic optic neuropathy)      Anemia in chronic renal disease      Avascular necrosis of bones of both hips (H)     s/p bilateral hip replacements     Basal cell carcinoma      Chronic hepatitis B (H)      Clostridium difficile colitis      Coronary artery disease involving native coronary artery of native heart without angina pectoris 6/17/2014    Coronary angiogram 6/17/14: Severe distal 3-vessel disease involving small vessels, not amenable to PCI or CABG.     CRP elevated      Depression      Diverticulosis      Dyslipidemia      FSGS (focal segmental glomerulosclerosis)      Gastric ulcer with hemorrhage 2/12/12     GERD (gastroesophageal reflux disease)      Glaucoma     OHTN     Hemorrhoids      Hypertension secondary to other renal disorders      Hypogonadism in male      Immunosuppressed status (H)      Kidney replaced by transplant     focal glomerulosclerosis      NSTEMI (non-ST elevated myocardial infarction) (H) 6/17/2014     JULIANE (obstructive sleep apnea)     Doesn't use CPAP     Paracentral scotoma     LE     Secondary renal hyperparathyroidism (H)      Squamous cell carcinoma        Past Surgical History:   Procedure Laterality Date     APPENDECTOMY       CATARACT IOL, RT/LT  4/19/2000    RE     CATARACT IOL, RT/LT  6/1/2000    LE     COLECTOMY SUBTOTAL  1983     10 cm, diverticulitis     COLONOSCOPY  2/13/2012    Procedure:COLONOSCOPY; Surgeon:SLOAN GALLARDO; Location:[facility]     ESOPHAGOSCOPY, GASTROSCOPY, DUODENOSCOPY (EGD), COMBINED  2/13/2012    Procedure:COMBINED ESOPHAGOSCOPY, GASTROSCOPY, DUODENOSCOPY (EGD); Surgeon:SLOAN GALLARDO; Location:[facility]     EXTRACAPSULAR CATARACT EXTRATION WITH INTRAOCULAR LENS IMPLANT  4-20-10, 6-1-10    Rt, Lt     HERNIA REPAIR  1995    Lt inguinal     HIP SURGERY      1981, bilat MITZI, revised 2001, 2005     KIDNEY SURGERY  1978 and 1993    transplant     KNEE SURGERY  1983, 1987    bilat TKA     MOHS MICROGRAPHIC PROCEDURE       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 Wife is also a kidney transplant recipient.     Works part-time     Social History   Substance Use Topics     Smoking status: Former Smoker     Packs/day: 1.00     Years: 9.00     Quit date: 1/1/1988     Smokeless tobacco: Former User     Alcohol use 0.0 oz/week     0 Standard drinks or equivalent per week      Comment: rarely 1 drink per month       Immunization History   Administered Date(s) Administered     HEPA 01/26/2009, 01/20/2016     Influenza (IIV3) PF 01/26/2009, 11/10/2010, 01/04/2012, 11/11/2013, 10/15/2015, 11/04/2016, 09/20/2017     Influenza Vaccine IM 3yrs+ 4 Valent IIV4 10/08/2014, 10/23/2018     Pneumo Conj 13-V (2010&after) 10/08/2014     Pneumococcal 23 valent  11/01/2005, 10/23/2018     Tdap (Adacel,Boostrix) 01/26/2009       Patient Active Problem List   Diagnosis     BCC (basal cell carcinoma), trunk     JULIANE (obstructive sleep apnea)     Hypertension secondary to other renal disorders     Coronary artery disease involving native coronary artery of native heart without angina pectoris     NSTEMI (non-ST elevated myocardial infarction) (H)     Depression     Diverticulosis     Hemorrhoids     Kidney replaced by transplant     Basal cell carcinoma     Squamous cell carcinoma     Dyslipidemia     CRP elevated     Glaucoma     AION (acute ischemic optic neuropathy)     Paracentral scotoma     Hip pain     Inflamed seborrheic keratosis     Intertrigo     Chronic hepatitis B (H)     Immunosuppressed status (H)     Hypogonadism in male     GERD (gastroesophageal reflux disease)     Aftercare following organ transplant     Acute midline low back pain without sciatica     Septic bursitis     Impaired mobility     Infection of lumbar spine (H)            Current Medications & Allergies:       amLODIPine  5 mg Oral Daily     aspirin  81 mg Oral Daily     atorvastatin  20 mg Oral Daily     cefTRIAXone  2 g Intravenous Q12H     docusate sodium  100 mg Oral BID     entecavir  0.5 mg Oral Daily     FLUoxetine  40 mg Oral QAM     isosorbide mononitrate  15 mg Oral Daily     latanoprost  1 drop Both Eyes At Bedtime     mycophenolate  750 mg Oral BID     omeprazole  20 mg Oral QAM     polyethylene glycol  17 g Oral Daily     predniSONE  5 mg Oral Daily     sodium chloride (PF)  3 mL Intracatheter Q8H       Infusions/Drips:      Allergies   Allergen Reactions     Penicillins Shortness Of Breath and Hives     Keflex [Cephalexin Hcl] Other (See Comments)     Pt could not recall reaction     Tetracycline Other (See Comments)     Patient could not recall reaction     Sulfa Drugs Rash            Physical Exam:   Vitals were reviewed.  All vitals stable.  Patient Vitals for the past 24 hrs:   BP  Temp Temp src Pulse Resp SpO2 Weight   11/08/18 1322 - - - - - - 77.9 kg (171 lb 11.2 oz)   11/08/18 1200 128/77 97.1  F (36.2  C) Oral 57 16 97 % -   11/08/18 1134 130/78 - - 57 16 94 % -   11/08/18 1100 124/74 - - 57 16 97 % -   11/08/18 1054 127/81 - - 59 16 99 % -   11/08/18 1044 131/78 97.6  F (36.4  C) Oral (!) 48 16 95 % -   11/08/18 0729 122/76 97.7  F (36.5  C) Oral 53 16 95 % -   11/08/18 0500 132/73 98.2  F (36.8  C) Oral 51 16 95 % -   11/07/18 2301 128/69 97.3  F (36.3  C) Oral 61 15 94 % -   11/07/18 2120 115/76 97.2  F (36.2  C) Oral 59 16 95 % -   11/07/18 1633 114/78 - - - - 95 % -     Ranges for vital signs:  Temp:  [97.1  F (36.2  C)-98.2  F (36.8  C)] 97.1  F (36.2  C)  Pulse:  [48-61] 57  Heart Rate:  [51-57] 51  Resp:  [15-16] 16  BP: (114-132)/(69-81) 128/77  SpO2:  [94 %-99 %] 97 %  Vitals:    11/05/18 1118 11/06/18 0955 11/08/18 1322   Weight: 77.8 kg (171 lb 8.3 oz) 78.7 kg (173 lb 8 oz) 77.9 kg (171 lb 11.2 oz)       Physical Examination:  GENERAL:  well-developed, well-nourished, in bed in no acute distress.   HEAD:  Head is normocephalic, atraumatic   EYES:  Eyes have anicteric sclerae without conjunctival injection   ENT:  No otorrhea.  NECK:  Supple.  LUNGS:  Clear to auscultation bilaterally from the anterior.  CARDIOVASCULAR:  Regular rate and rhythm with no murmurs, gallops or rubs.  ABDOMEN:  Normal bowel sounds, soft, nontender. No appreciable hepatosplenomegaly.   SKIN:  Stable diffuse lesions. Small abrasions to left upper extremity.  Line in place without any surrounding erythema or exudate.   NEUROLOGIC:  Grossly nonfocal. Active x4 extremities          Laboratory Data:     Absolute CD4   Date Value Ref Range Status   12/18/2008 898 mm3 Final       Inflammatory Markers  Recent Labs   Lab Test  11/08/18   0715  11/06/18   0647  11/05/18   0739  11/04/18   1214  11/07/17   1207  10/08/14   0835   10/03/14   1513   SED   --    --   48*  18  27*   --    --   52*   CRP  52.0*   150.0*  160.0*  110.0*  10.3*  71.2*   < >  148.0*    < > = values in this interval not displayed.       Immune Globulin Studies   No lab results found.    Metabolic Studies     Recent Labs   Lab Test  11/08/18   0715  11/07/18   0801   11/05/18   0739   11/04/18   1214  10/23/18   1116   10/04/14   0734   02/12/12   1850   NA  144  140   < >  139   --   140  138   < >  137   < >  138   POTASSIUM  3.6  3.7   < >  3.6   --   3.3*  3.7   < >  3.7   < >  3.9   CHLORIDE  105  104   < >  107   --   107  103   < >  105   < >  106   CO2  30  28   < >  24   --   27  28   < >  25   < >  21   ANIONGAP  8  9   < >  8   --   6  7   < >  7   < >  11   BUN  14  15   < >  11   --   10  9   < >  9   < >  16   CR  0.74  0.69   < >  0.69   < >  0.69  0.83   < >  0.84   < >  0.85   GFRESTIMATED  >90  >90   < >  >90   < >  >90  >90   < >  >90  Non  GFR Calc     < >  >90   GLC  59*  82   < >  80   --   77  82   < >  95   < >  71   HEMA  8.8  8.8   < >  8.4*   --   8.3*  9.2   < >  8.7   < >  9.3   PHOS   --    --    --   2.9   --    --    --    --    --    < >   --    MAG   --    --    --   1.7   --    --    --    < >   --    < >   --    LACT   --    --    --    --    --   1.1   --    --    --    --   1.1   PCAL   --    --    --    --    --    --    --    --   0.06   --    --    CKT   --    --    --    --    --    --   86   --    --    --    --     < > = values in this interval not displayed.       Hepatic Studies  Recent Labs   Lab Test  10/23/18   1116  03/13/18   0934  09/12/17   0923   06/10/14   1044   BILITOTAL  0.8  0.5  0.9   < >  1.2   BILIDELTA   --    --    --    --   0.1   BILICONJ   --    --    --    --   0.0   DBIL  0.2  0.1  0.2   < >   --    ALKPHOS  87  77  85   < >  81   PROTTOTAL  7.4  6.8  7.2   < >  7.2   ALBUMIN  3.5  3.2*  3.3*   < >  3.9   AST  20  24  20   < >  38   ALT  22  27  24   < >  36    < > = values in this interval not displayed.       Pancreatitis testing  Recent Labs   Lab Test   10/23/18   1116   02/12/12   1850   LIPASE   --    --   94   TRIG  97   < >   --     < > = values in this interval not displayed.       Gout Labs    No lab results found.    Hematology Studies    Recent Labs   Lab Test  11/08/18   0715   11/06/18   0647  11/05/18   0739   11/04/18   1214  10/23/18   1116  06/21/18   1154   WBC  6.6   --   6.7  9.6   --   9.6  7.8  8.1   ANEU  3.6   --   3.9  6.9   --   6.2   --    --    ALYM  2.0   --   1.9  1.5   --   1.7   --    --    ALISIA  0.7   --   0.8  1.1   --   1.7*   --    --    AEOS  0.3   --   0.1  0.1   --   0.1   --    --    HGB  12.1*   --   11.6*  11.7*   --   12.5*  14.0  12.9*   HCT  39.0*   --   38.4*  38.4*   --   40.4  45.0  40.9   PLT  342   < >  315  307   < >  307  352  332    < > = values in this interval not displayed.       Clotting Studies    Recent Labs   Lab Test  03/13/18   0934  09/12/17   0923  03/10/17   1014  08/30/16   1042   10/03/14   1513   INR  0.95  0.86  0.97  0.99   < >  0.99   PTT   --    --    --    --    --   23    < > = values in this interval not displayed.       Iron Testing  Recent Labs   Lab Test  11/08/18   0715   MCV  92       Markers  Alpha Fetoprotein   Date Value Ref Range Status   07/08/2015 3.8 0 - 8 ug/L Final       Autoimmune Testing  No lab results found.    Invalid input(s): MPO, PRO3, C3, C4, VASPAN    Arterial Blood Gas Testing  No lab results found.     Thyroid Studies     Recent Labs   Lab Test  11/07/17   1207  05/06/15   1505  02/08/11   1022   TSH  1.73  0.56  3.54       Urine Studies   Recent Labs   Lab Test  11/04/18   2128  01/05/11   0916   URINEPH  6.5  6.0   NITRITE  Negative  Negative   LEUKEST  Negative  Negative   WBCU  1  0       Medication levels  Recent Labs   Lab Test  11/06/18   0647  06/21/18   1154   10/05/14   0403   VANCOMYCIN   --    --    --   14.6   CYCLSP   --   <25*   --    --    MPACID  0.55*   --    < >   --    MPAG  29.5*   --    < >   --     < > = values in this interval not displayed.        CSF testing   No lab results found.    Invalid input(s): CADAM, EVPCR, ENTPCR, ENTEROVIRUS    Body fluid stats  Recent Labs   Lab Test  11/08/18   1020   GS  No organisms seen  Rare  WBC'S seen         Microbiology:  Fungal testing  No lab results found.    Invalid input(s): HIFUN, FUNGL    Last Culture results with specimen source  Culture Micro   Date Value Ref Range Status   11/08/2018 PENDING  Preliminary   11/04/2018 No growth after 4 days  Preliminary   11/04/2018 No growth after 4 days  Preliminary   05/24/2015   Final    No Salmonella, Shigella, Campylobacter, E. coli O157, Aeromonas, or Plesiomonas   isolated.     10/06/2014 No growth  Final   10/06/2014 No growth  Final   10/04/2014 No growth  Final   10/04/2014 No growth  Final   10/03/2014 No growth  Final   10/03/2014   Final    Cancelled by lab - Specimen never received. Notified Naomi Singh RN (5B) @ 723.    10/05/14     10/03/2014 (A)  Final    Cultured on the 1st day of incubation: Corynebacterium species  Critical Value/Significant Value, preliminary result only, called to and read   back by Chang King RN on 5B@1831 on [id number]      07/08/2005 No growth  Final   07/08/2005 No anaerobes isolated  Final   06/01/2005 No growth  Final   06/01/2005 No anaerobes isolated  Final    Specimen Description   Date Value Ref Range Status   11/08/2018 Cyst fluid spinal  Final   11/08/2018 Cyst fluid spinal  Final   11/05/2018 Nares  Final   11/04/2018 Blood Left Arm  Final   11/04/2018 Blood Right Hand  Final   04/10/2018 Nasal  Final   02/15/2018 Nasal  Final     Comment:     Swab   05/24/2015 Feces  Final   05/24/2015 Feces  Final   10/07/2014 Urine  Final   10/06/2014 Blood Right Hand  Final   10/06/2014 Blood Right Hand  Final   10/04/2014 Blood Right Hand  Final   10/04/2014 Blood Right Arm  Final   10/03/2014 Blood Right Arm  Final   10/03/2014 Blood  Final   10/03/2014 Blood Right Hand  Final   02/13/2012 Plasma  Final   07/08/2005 Other LEFT  FEMORAL CANAL  Final   07/08/2005 Other LEFT FEMORAL CANAL  Final   07/08/2005 Other LEFT FEMORAL CANAL  Final        Last check of C difficile  C Diff Toxin B PCR   Date Value Ref Range Status   05/24/2015 (A) NEG Final    Formed stools are not acceptable, by national guidelines, for the detection of   Clostridium difficile toxin and culture.   Canceled, Test credited  SPOKE WITH ESMER JACKSON.         Syphilis Testing    No results found for: TREPT, TREPAB, RPR, TPPAT, CVD, CVD    Quantiferon testing   Recent Labs   Lab Test  08/30/16   1043   TBRSLT  Negative   TBAGN  0.00       Virology:  CMV viral loads    Recent Labs   Lab Test  09/12/17   0923  02/14/12   0556   CSPEC  Plasma, EDTA anticoagulant  Whole blood, EDTA anticoagulant   CMVLOG  Not Calculated   --        Log IU/mL of CMVQNT   Date Value Ref Range Status   09/12/2017 Not Calculated <2.1 [Log_IU]/mL Final       No results found for: H6RES    EBV DNA Copies/mL   Date Value Ref Range Status   11/06/2018 EBV DNA Not Detected EBVNEG^EBV DNA Not Detected [Copies]/mL Final   08/02/2017 EBV DNA Not Detected EBVNEG [Copies]/mL Final       BK Virus Testing   No results found for: 11091, BKRES    Parvovirus Testing  No lab results found.    Invalid input(s): PRVRES    Adenovirus Testing  No lab results found.    Invalid input(s): ADENAB, ADAG, ADENOVIRUS, ADQT    Hepatitis B Testing   Recent Labs   Lab Test  06/10/14   1044  09/10/13   1025  05/23/13   0855  01/05/11   0909   HBSAB   --    --    --   0.0   HBCAB   --    --    --   Positive*   HEPBANG   --   Positive*   --   Positive*   HBEABY   --   Positive Reference range: Negative (Note) The anti-HBe is repeatedly reactive, which is consistent with recent or remote Hepatitis B infection. Anti-HBe can be present in a small proportion of chronic HBV infections, but its presence usually indicates resolution. If HBeAg is also positive, this may represent the transition between the appearance of anti-HBe and decline of  HBeAg, and retesting in one month may be useful. Performed by [facility], [address] [place] [address] [phone] [email or web address], Villa Chandra MD, Lab. Director*   --   Positive   HBEAGN   --   Negative Reference range: Negative (Note) Performed by [facility], [address] [place] [address] [phone] [email or web address], Villa Chandra MD, Lab. Director   --   Negative   HBQTT  Not Quantified  Unit: IU/mL    32  470,000 Unit: IU/mL  14,000      Hepatitis C Antibody   Date Value Ref Range Status   12/18/2008 Negative NEG Final       CMV IgG Antibody   Date Value Ref Range Status   02/14/2012 <0.20  Negative for anti-CMV IgG U/mL Final     CMV IgM Antibody   Date Value Ref Range Status   02/14/2012 <8.00  No detectable antibody. AU/mL Final     No results found for: EBVCAG, EBIG2, EBIGM, EBVIGG, EBIGG, EBVAGN, BI2928, TOXG  No results found for: H1IGG, H2IGG, EBVCAM    Imaging:  Recent Results (from the past 48 hour(s))   XR Lumbar Puncture Therapeutic    Narrative    FLUOROSCOPY GUIDED LUMBAR SPINE ASPIRATION    History: Patient with history of L4-L5 septic bursa cyst  aspirate/tissue culture here for fluoroscopy guided aspiration.    Radiologists: LAUREL Carpenter MD ; EMA Carbone MD    Fluoroscopy time: 30 seconds     IV contrast: None.    Complications: None.    Consent: Informed written and verbal consent obtained.    Procedure/Findings: The patient was placed on the fluoroscopy table  and the prone position. The L4-5 interspinous space was identified  under fluoroscopy. The overlying skin was prepped and draped in the  usual sterile fashion. Approximately 10 ml 1% lidocaine was used for  local anesthesia. Under fluoroscopic guidance , a 20 gauge spinal  needle was advanced into the interspinous space at L4-5. Aspiration  was performed and approximately 0.25 mL of viscous fluid was obtained.  The needle was removed and immediate hemostasis was achieved. No  immediate complication.        Impression    Impression: Uncomplicated fluoroscopic-guided aspiration of L4-5  interspinous bursal cyst.    I, ARVIN LOPEZ MD, attest that I was present in the procedure  room for the entire procedure.    I have personally reviewed the examination and initial interpretation  and I agree with the findings.    ARVIN LOPEZ MD     '

## 2018-11-08 NOTE — DISCHARGE SUMMARY
[facility], [facility] Discharge Summary       Date of Admission:  2018  Date of Discharge:  11/10/2018  Date of Service: 11/10/2018  Discharging Attending Provider: Torsten Villanueva MD  Discharge Team: [facility]    Discharge Diagnoses      Infection of lumbar spine (H)  Acute midline low back pain without sciatica  Septic bursitis  Impaired mobility  Immunosuppressed status (H)    Follow-ups Needed After Discharge   - Recheck CRP, ESR, CBC with diff, CMP in 2 weeks and at end of antibiotic course  - Follow up appointment with PCP within a week and then for recheck in ~3 weeks  - Remove PICC after antibiotic course is completed    Hospital Course   Jerad Ross is a 58 year old male with a history of FSGS s/p  donor renal transplant (baseline Cr 0.8-1.1), HTN, CAD, chronic cough, anemia in chronic renal disease, vitamin D deficiency, chronic hepatitis B, basal cell carcinoma, squamous cell skin carcinoma, who was admitted 2018 for acute mid-lumbar back pain and fever up to 100.8. Lumbar spine MRI showed L4-5 interspinous bursal cyst with adjacent reactive epidural thickening and paraspinous cellulitis. Transplant ID followed and recommended ceftriaxone. Neurosurgery consulted, recommended no surgical intervention. IR performed aspiration of the cyst on  to clarify diagnosis, but after several days of antibiotics, no growth was detected on cultures. He remained on ceftriaxone  ID throughout hospitalization. PICC line was placed for continued outpatient IV ceftriaxone treatment with a total planned course of 4 weeks. He discharged to TCU for rehab on 11/10 in stable condition with improved pain.    The following problems were addressed during his hospitalization:    # Infected L4-5 interspinous bursal cyst  # Paraspinous cellulitis  Gram stain and culture sterile to date.   - Continue ceftriaxone 2g IV daily for  total of 4 weeks of antibiotics (through 12/4/18)  - Pain control with oxycodone 5-10 mg q6 hrs prn  - Avoid ibuprofen/NSAIDS given renal transplant     # Atelectasis  Patient had some crackles on exam but no evidence of pneumonia. Improved with use of incentive spirometer. Expect this will resolve with increased therapy and movement.  - Continue incentive spirometry  - Frequent walking as able      # DDKT (Baseline creatinine 0.8-1.1)  Cr 0.69-0.76 while in hospital. Transplant nephrology elected to continue CellCept at PTA dosing despite infection since it is a minimal dose.  - Continue CellCept 750 mg BID  - Continue prednisone 5mg daily  - Avoid nephrotoxic agents      # Chronic Hepatitis B  Continue PTA entecavir      # Major Depressive Disorder  Continue PTA Fluoxetine      # CAD  # HTN  Continue PTA IMDUR, Plavix, ASA 81, Amlodipine, lipitor      # Restrictive pattern on PFTs  PFTs showing mildly restrictive pattern, seen by pulmonology who said it could be mild asthma.  - Continue PTA advair, PRN albuterol    # Discharge Pain Plan:  - During his hospitalization, Jerad experienced pain due to L4-5 interspinous bursal cyst.  The pain plan for discharge was discussed with Jerad and the plan was created in a collaborative fashion.    - Oxycodone 5-10 mg q6 hrs prn    Consultations This Hospital Stay   PHARMACY TO DOSE VANCO  MEDICATION HISTORY IP PHARMACY CONSULT  OCCUPATIONAL THERAPY ADULT IP CONSULT  PHYSICAL THERAPY ADULT IP CONSULT  NEUROSURGERY ADULT IP CONSULT  INFECTIOUS DISEASE TRANSPLANT SOT ADULT IP CONSULT  NEPHROLOGY KIDNEY/PANCREAS TRANSPLANT ADULT IP CONSULT  INTERVENTIONAL RADIOLOGY ADULT/PEDS IP CONSULT  VASCULAR ACCESS ADULT IP CONSULT  VASCULAR ACCESS ADULT IP CONSULT    Code Status   Full Code       The patient was discussed with Dr. Villanueva.    [facility]  [facility]   Pager:  [phone]  ______________________________________________________________________  Review of Systems   Constitutional: Negative for chills and fever.   HENT: Positive for sore throat.    Respiratory: Negative for cough and shortness of breath.    Cardiovascular: Negative for chest pain.   Musculoskeletal: Positive for back pain.     Physical Exam   Vital Signs: Temp: 97.1  F (36.2  C) Temp src: Oral BP: 124/84 Pulse: 58   Resp: 20 SpO2: 99 % O2 Device: None (Room air)    Weight: 181 lbs 12.8 oz    Physical Exam   General: laying in bed, no acute distress  HEENT: normocephalic, atraumatic  Respiratory: lungs with mild crackles in the bases bilaterally, no wheezes  Cardiac: regular rate and rhythm, S1/S2 heard, no murmurs, rubs, or gallops  Abdominal: non-distended, normal bowel sounds, soft, non-tender to palpation in all quadrants  Musculoskeletal: no tenderness to palpation of lumbar spinal region of back  Skin: many brown, raised, ~2 mm lesions throughout all exposed skin areas  Neurologic: cranial nerves grossly intact  Psychiatric: appropriate mood and affect      Significant Results and Procedures   Results for orders placed or performed during the hospital encounter of 11/04/18   MR Lumbar Spine w/o & w Contrast    Narrative    MR LUMBAR SPINE W/O & W CONTRAST 11/4/2018 1:46 PM    Provided History: lumbar back pain radiating to left hip, fevers,  immune suppressed, rule out osteomyelitis versus abscess; .    Comparison: Abdominal MRI 7/13/2015    Technique: Sagittal T1-weighted and T2-weighted and axial T2-weighted  images of the lumbar spine were obtained without intravenous contrast.  Post intravenous contrast using gadolinium axial and sagittal  T1-weighted images were obtained with fat saturation.    Contrast: 7.9CC GADAVIST     Findings:   5 lumbar-type vertebrae counting down from the presumed 12th ribs. The  tip the conus medullaris is at L1-2. Cauda equina nerve roots and  visualized thoracic cord are  normal.    Asymmetric left facet joint effusions and interspinous edema with 2 mm  dorsal epidural bursal cyst formation. There is abnormal enhancing  edema within the paraspinous musculature bilaterally at L2-pelvis. No  discrete drainable fluid collection. Abnormal epidural  thickening/enhancement L4-S1. No abnormal intrathecal enhancement.    Normal alignment of the lumbar vertebrae. Normal lumbar lordosis. Disc  degeneration at L1-2 with loss of disc height and intradiscal T2  signal. Severe asymmetric right psoas muscular atrophy. Multiple cm  nonenhancing well-circumscribed heterogeneous hemorrhagic signal focus  in the right perivertebral space in the expected location of the right  psoas muscle at the level of L5 measuring up to 2.7 cm in maximal  dimension, unchanged dating back to at least 7/13/2015, probable  chronic organized hematoma.     Findings on a level by level basis are as follows:    T12-L1: No spinal canal or neural foraminal stenosis.    L1-2: Disc bulge. Mild spinal canal stenosis. No significant neural  foraminal stenosis.    L2-3: No spinal canal or neural foraminal stenosis.    L3-4: Disc bulge. Mild bilateral neural foraminal stenosis. No  significant spinal canal stenosis.    L4-5: Disc bulge and facet arthrosis. Moderate bilateral neural  foraminal stenosis. No significant spinal canal stenosis.    L5-S1: Facet arthrosis. No spinal canal or neural foraminal stenosis.    Atrophic kidneys.      Impression    Impression:  1. Findings suspicious for infected L4-5 interspinous bursal cyst with  adjacent reactive epidural thickening and paraspinous cellulitis. No  drainable fluid collection.  2. Multilevel lumbar spondylosis. Moderate bilateral neural foraminal  stenosis at L4-5. Mild spinal canal stenosis at L1-2.  3. Multiple well-circumscribed hemorrhagic foci in the right  perivertebral soft tissues at L5 in the location of a severely  atrophied right psoas muscle, unchanged dating back  to at least  7/13/2015, suspected chronic organized hematoma.    [Result: Infected interspinous bursal cyst]    Finding was identified on 11/4/2018 1:37 PM.     Dr. Han was contacted by Dr. Lopez at 11/4/2018 1:56 PM and  verbalized understanding of the urgent finding.     ARVIN LOPEZ MD   XR Lumbar Puncture Therapeutic    Narrative    FLUOROSCOPY GUIDED LUMBAR SPINE ASPIRATION    History: Patient with history of L4-L5 septic bursa cyst  aspirate/tissue culture here for fluoroscopy guided aspiration.    Radiologists: LAUREL Lopez MD ; EMA Carbone MD    Fluoroscopy time: 30 seconds     IV contrast: None.    Complications: None.    Consent: Informed written and verbal consent obtained.    Procedure/Findings: The patient was placed on the fluoroscopy table  and the prone position. The L4-5 interspinous space was identified  under fluoroscopy. The overlying skin was prepped and draped in the  usual sterile fashion. Approximately 10 ml 1% lidocaine was used for  local anesthesia. Under fluoroscopic guidance , a 20 gauge spinal  needle was advanced into the interspinous space at L4-5. Aspiration  was performed and approximately 0.25 mL of viscous fluid was obtained.  The needle was removed and immediate hemostasis was achieved. No  immediate complication.       Impression    Impression: Uncomplicated fluoroscopic-guided aspiration of L4-5  interspinous bursal cyst.    I, ARVIN LOPEZ MD, attest that I was present in the procedure  room for the entire procedure.    I have personally reviewed the examination and initial interpretation  and I agree with the findings.    ARVIN LOPEZ MD       Pending Results   These results will be followed up by PCP  Unresulted Labs Ordered in the Past 30 Days of this Admission     Date and Time Order Name Status Description    11/7/2018 0954 Fluid Culture Aerobic Bacterial Preliminary              [facility]    Discharge  Disposition   Discharged to rehabilitation facility: [facility]  Condition at discharge: Stable    Discharge Orders     General info for SNF   Length of Stay Estimate: Short Term Care: Estimated # of Days <30    Condition at Discharge: Improving  Level of care:skilled   Rehabilitation Potential: Good  Admission H&P remains valid and up-to-date: Yes  Recent Chemotherapy: N/A  Use Nursing Home Standing Orders: N/A     Mantoux instructions   Give two-step Mantoux (PPD) Per Facility Policy Yes     Reason for your hospital stay   You were hospitalized for infected bursa in your back.  You were treated with IV antibiotics and improved clinically.  You were improving and stable at the time of discharge     Follow Up and recommended labs and tests   Follow up with prison physician.  The following labs/tests are recommended: Bi-Weekly CRP and CBC, Follow up in 2 weeks with primary care provider     Activity - Up ad muriel     Full Code     Advance Diet as Tolerated   Follow this diet upon discharge: Orders Placed This Encounter     Regular Diet Adult       Discharge Medications   Current Discharge Medication List      START taking these medications    Details   cefTRIAXone (ROCEPHIN) 2 GM vial Inject 2 g into the vein every 24 hours  Qty: 10 each    Associated Diagnoses: Infection of lumbar spine (H); Septic bursitis      docusate sodium (COLACE) 100 MG capsule Take 1 capsule (100 mg) by mouth 2 times daily  Qty: 60 capsule    Associated Diagnoses: Infection of lumbar spine (H); Acute midline low back pain without sciatica      heparin lock flush 10 UNIT/ML SOLN injection 2-5 mLs by Intracatheter route once as needed for line flush (for locking each dormant lumen with line placement)    Associated Diagnoses: Septic bursitis; Infection of lumbar spine (H)      oxyCODONE IR (ROXICODONE) 5 MG tablet Take 1-2 tablets (5-10 mg) by mouth every 6 hours as needed for moderate to severe pain  Qty: 19 tablet, Refills: 0     Associated Diagnoses: Infection of lumbar spine (H)      polyethylene glycol (MIRALAX/GLYCOLAX) Packet Take 17 g by mouth daily  Qty: 7 packet    Associated Diagnoses: Infection of lumbar spine (H); Septic bursitis         CONTINUE these medications which have NOT CHANGED    Details   acetaminophen (TYLENOL) 325 MG tablet Take 1-2 tablets by mouth every 6 hours as needed.      amLODIPine (NORVASC) 5 MG tablet Take 1 tablet (5 mg) by mouth daily  Qty: 90 tablet, Refills: 1    Associated Diagnoses: Acute chest pain      aspirin 81 MG tablet Take 81 mg by mouth daily       atorvastatin (LIPITOR) 20 MG tablet Take 1 tablet (20 mg) by mouth daily  Qty: 90 tablet, Refills: 1    Associated Diagnoses: NSTEMI (non-ST elevated myocardial infarction) (H)      calcium citrate-vitamin D (CITRACAL) 315-200 MG-UNIT TABS Take 1 tablet by mouth daily.      CHOLECALCIFEROL PO Take 1 tablet by mouth daily Dose unknown      clopidogrel (PLAVIX) 75 MG tablet Take 1 tablet (75 mg) by mouth daily  Qty: 90 tablet, Refills: 1    Associated Diagnoses: NSTEMI (non-ST elevated myocardial infarction) (H); Coronary artery disease involving native coronary artery of native heart without angina pectoris      entecavir (BARACLUDE) 0.5 MG tablet Take 1 tablet (0.5 mg) by mouth daily  Qty: 90 tablet, Refills: 3    Associated Diagnoses: Chronic viral hepatitis B without delta agent and without coma (H); Chronic hepatitis B (H)      FLUoxetine (PROZAC) 40 MG capsule Take 1 capsule (40 mg) by mouth every morning  Qty: 90 capsule, Refills: 1    Associated Diagnoses: Major depressive disorder, recurrent episode, moderate (H)      isosorbide mononitrate (IMDUR) 30 MG 24 hr tablet Take 0.5 tablets (15 mg) by mouth daily  Qty: 45 tablet, Refills: 1    Associated Diagnoses: Acute chest pain      Multiple Vitamins-Iron (ONE DAILY MULTIVITAMIN/IRON) TABS Take 1 tablet by mouth daily      mycophenolate (GENERIC EQUIVALENT) 250 MG capsule Take 3 capsules (750 mg)  by mouth 2 times daily  Qty: 180 capsule, Refills: 11    Associated Diagnoses: Kidney transplanted      Omega-3 Fatty Acids (OMEGA 3 PO) Take 1 capsule by mouth daily Dose unknown      omeprazole (PRILOSEC OTC) 20 MG tablet Take  by mouth daily.  Qty: 30 tablet    Associated Diagnoses: Chronic hepatitis B (H); HTN (hypertension); Depression; Unspecified essential hypertension      predniSONE (DELTASONE) 5 MG tablet Take 1 tablet (5 mg) by mouth daily  Qty: 90 tablet, Refills: 3    Associated Diagnoses: Kidney transplanted      albuterol (PROAIR HFA) 108 (90 Base) MCG/ACT Inhaler Inhale 2 puffs into the lungs every 6 hours as needed for shortness of breath / dyspnea or wheezing  Qty: 1 Inhaler, Refills: 2    Comments: Dispense 18g  Associated Diagnoses: Cough; Wheezing      BETA BLOCKER NOT PRESCRIBED, INTENTIONAL, Beta Blocker not prescribed intentionally due to Bradycardia < 50 bpm without beta blocker therapy  Refills: 0    Associated Diagnoses: NSTEMI (non-ST elevated myocardial infarction) (H)      BUDESONIDE, INHALATION, 90 MCG/ACT AEPB Inhale 2 puffs into the lungs 2 times daily  Qty: 1 each, Refills: 3    Associated Diagnoses: Asthma      fluticasone (FLONASE) 50 MCG/ACT spray Spray 1-2 sprays into both nostrils daily  Qty: 3 Bottle, Refills: 11    Associated Diagnoses: Bronchitis with bronchospasm      fluticasone-salmeterol (ADVAIR) 250-50 MCG/DOSE diskus inhaler Inhale 1 puff into the lungs every 12 hours  Qty: 60 Inhaler, Refills: 1    Associated Diagnoses: Cough      latanoprost (XALATAN) 0.005 % ophthalmic solution Place 1 drop into both eyes At Bedtime  Qty: 3 Bottle, Refills: 3    Associated Diagnoses: Borderline glaucoma with ocular hypertension, bilateral           Allergies   Allergies   Allergen Reactions     Penicillins Shortness Of Breath and Hives     Keflex [Cephalexin Hcl] Other (See Comments)     Pt could not recall reaction     Tetracycline Other (See Comments)     Patient could not recall  reaction     Sulfa Drugs Rash       I was present with the medical student who participated in the service and in the documentation of this note. I have verified the history and personally performed the physical exam and medical decision making, and have verified the content of the note, which accurately reflects my assessment of the patient and the plan of care.   Pilar Bustamante MD

## 2018-11-08 NOTE — PLAN OF CARE
Problem: Patient Care Overview  Goal: Plan of Care/Patient Progress Review  Discharge Planner OT   Patient plan for discharge: TCU  Current status: Pt ambulated ~500 feet this session with SBA, educated thoroughly on EC/WS techniques for fatigue management, pt asked many appropriate questions throughout and was appreciative of education provided.   Barriers to return to prior living situation: deconditioning, fatigue with activity   Recommendations for discharge: TCU   Rationale for recommendations:  If pt had assist pt would likely be safe to d/c home. However, pt reports his wife has her own medical issues and would likely not be able to help pt at home. Pt also has IV abx, would benefit from short TCU stay to increase functional strength and independence with ADLs/IADLs and learn how to manage IV abx.        Entered by: Elis Bradford 11/08/2018 4:03 PM

## 2018-11-08 NOTE — PLAN OF CARE
Problem: Patient Care Overview  Goal: Plan of Care/Patient Progress Review  Outcome: Improving  Pain controlled with PRN oxycodone and Tylenol.  Voiding adequate amounts. Patient has been NPO since midnight due to having procedure in IR later today, time unknown. Patient has a right PIV that is SL'd between antibiotics. Up with x1 and walker. Plans to discharge to TCU. Continue POC.

## 2018-11-08 NOTE — PLAN OF CARE
Problem: Patient Care Overview  Goal: Plan of Care/Patient Progress Review  Outcome: Improving  Patient returned from IR at 10:40 AM. VSS other than HR down to 48 with irregular radial pulse, MD notified and aware. Per pt this is baseline for him occasionally, no additional interventions ordered at this time. Neuros intact. On bedrest 1 hr after the procedure, after that up with SBA and a walker. Tolerating diet. Passing flatus and last BM 11/7. Voiding with good output. PIV saline locked between antibiotics. Continue with POC.

## 2018-11-08 NOTE — PROGRESS NOTES
[facility]  Transplant Infectious Disease Progress Note     Patient:  Jerad Ross  YOB: 1960  Medical record number: [id number]         Assessment and Recommendations:   Recommendations:    - Continue ceftriaxone with transition to q 24 hour dosing after completion of day 2 therapy. Will likely require 4 week total course.  - Continue trending CRP every other day.  - Will follow cyst aspirate results along with you.    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 No leukocytosis (WBC 9.6), lactate 1.1, CRP elevated at 110-->160. Patient currently on vanc/ertapenem. As concern for true penicillin allergy is low in setting of possible childhood reaction and recent tolerance of cephalosporins, may discontinue vanc and ertapenem and transition to ceftriaxone. As current infection does not appear to invade CNS, would recommend short course at meningitic dosing to ensure adequate CNS penetration prior to transition to ceftriaxone 2 g daily dosing. IR to obtain tissue sample with culture and gram stain.       - QTc interval: No recent EKG available   - Bacterial prophylaxis:  None   - Pneumocystis prophylaxis: None   - Fungal prophylaxis: None   - Isolation status: Good hand hygiene.       The above patient was seen and plan discussed with the attending, Dr. Frye.     Sheila Chavira  PGY-1, Internal Medicine  P: [phone]         Interval History:         Patient felt well overnight without acute events. Tolerating ceftriaxone without side effects. He is currently NPO in preparation for cyst biopsy by IR. Does endorse pain with movement/ weight bearing as before. He is able to work with occupational therapy. Blood cultures remain negative.    Transplants:  10/6/1993 (Kidney), 4/18/1978 (Kidney),  Postoperative day:  9163.  Coordinator Vale Bennett      Past Medical History:   Diagnosis Date     AION (acute ischemic optic neuropathy)      Anemia in chronic renal disease      Avascular necrosis of bones of both hips (H)     s/p bilateral hip replacements     Basal cell carcinoma      Chronic hepatitis B (H)      Clostridium difficile colitis      Coronary artery disease involving native coronary artery of native heart without angina pectoris 6/17/2014    Coronary angiogram 6/17/14: Severe distal 3-vessel disease involving small vessels, not amenable to PCI or CABG.     CRP elevated      Depression      Diverticulosis      Dyslipidemia      FSGS (focal segmental glomerulosclerosis)      Gastric ulcer with hemorrhage 2/12/12     GERD (gastroesophageal reflux disease)      Glaucoma     OHTN     Hemorrhoids      Hypertension secondary to other renal disorders      Hypogonadism in male      Immunosuppressed status (H)      Kidney replaced by transplant     focal glomerulosclerosis      NSTEMI (non-ST elevated myocardial infarction) (H) 6/17/2014     JULIANE (obstructive sleep apnea)     Doesn't use CPAP     Paracentral scotoma     LE     Secondary renal hyperparathyroidism (H)      Squamous cell carcinoma        Past Surgical History:   Procedure Laterality Date     APPENDECTOMY       CATARACT IOL, RT/LT  4/19/2000    RE     CATARACT IOL, RT/LT  6/1/2000    LE     COLECTOMY SUBTOTAL  1983    10 cm, diverticulitis     COLONOSCOPY  2/13/2012    Procedure:COLONOSCOPY; Surgeon:SLOAN GALLARDO; Location:[facility]     ESOPHAGOSCOPY, GASTROSCOPY, DUODENOSCOPY (EGD), COMBINED  2/13/2012    Procedure:COMBINED ESOPHAGOSCOPY, GASTROSCOPY, DUODENOSCOPY (EGD); Surgeon:SLOAN GALLARDO; Location:[facility]     EXTRACAPSULAR CATARACT EXTRATION WITH INTRAOCULAR LENS IMPLANT  4-20-10, 6-1-10    Rt, Lt     HERNIA REPAIR  1995    Lt inguinal     HIP SURGERY      1981, bilat MITZI, revised 2001, 2005     KIDNEY  SURGERY  1978 and 1993    transplant     KNEE SURGERY  1983, 1987    bilat TKA     MOHS MICROGRAPHIC PROCEDURE       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 Wife is also a kidney transplant recipient.     Works part-time     Social History   Substance Use Topics     Smoking status: Former Smoker     Packs/day: 1.00     Years: 9.00     Quit date: 1/1/1988     Smokeless tobacco: Former User     Alcohol use 0.0 oz/week     0 Standard drinks or equivalent per week      Comment: rarely 1 drink per month       Immunization History   Administered Date(s) Administered     HEPA 01/26/2009, 01/20/2016     Influenza (IIV3) PF 01/26/2009, 11/10/2010, 01/04/2012, 11/11/2013, 10/15/2015, 11/04/2016, 09/20/2017     Influenza Vaccine IM 3yrs+ 4 Valent IIV4 10/08/2014, 10/23/2018     Pneumo Conj 13-V (2010&after) 10/08/2014     Pneumococcal 23 valent 11/01/2005, 10/23/2018     Tdap (Adacel,Boostrix) 01/26/2009       Patient Active Problem List   Diagnosis     BCC (basal cell carcinoma), trunk     JULIANE (obstructive sleep apnea)     Hypertension secondary to other renal disorders     Coronary artery disease involving native coronary artery of native heart without angina pectoris     NSTEMI (non-ST elevated myocardial infarction) (H)     Depression     Diverticulosis     Hemorrhoids     Kidney replaced by transplant     Basal cell carcinoma     Squamous cell carcinoma      Dyslipidemia     CRP elevated     Glaucoma     AION (acute ischemic optic neuropathy)     Paracentral scotoma     Hip pain     Inflamed seborrheic keratosis     Intertrigo     Chronic hepatitis B (H)     Immunosuppressed status (H)     Hypogonadism in male     GERD (gastroesophageal reflux disease)     Aftercare following organ transplant     Acute midline low back pain without sciatica     Septic bursitis     Impaired mobility     Infection of lumbar spine (H)            Current Medications & Allergies:       amLODIPine  5 mg Oral Daily     aspirin  81 mg Oral Daily     atorvastatin  20 mg Oral Daily     cefTRIAXone  2 g Intravenous Q12H     docusate sodium  100 mg Oral BID     entecavir  0.5 mg Oral Daily     FLUoxetine  40 mg Oral QAM     isosorbide mononitrate  15 mg Oral Daily     latanoprost  1 drop Both Eyes At Bedtime     mycophenolate  750 mg Oral BID     omeprazole  20 mg Oral QAM     polyethylene glycol  17 g Oral Daily     predniSONE  5 mg Oral Daily     sodium chloride (PF)  3 mL Intracatheter Q8H       Infusions/Drips:      Allergies   Allergen Reactions     Penicillins Shortness Of Breath and Hives     Keflex [Cephalexin Hcl] Other (See Comments)     Pt could not recall reaction     Tetracycline Other (See Comments)     Patient could not recall reaction     Sulfa Drugs Rash            Physical Exam:   Vitals were reviewed.  All vitals stable.  Patient Vitals for the past 24 hrs:   BP Temp Temp src Pulse Resp SpO2   11/07/18 2301 128/69 97.3  F (36.3  C) Oral 61 15 94 %   11/07/18 2120 115/76 97.2  F (36.2  C) Oral 59 16 95 %   11/07/18 1633 114/78 - - - - 95 %   11/07/18 0652 122/74 98.7  F (37.1  C) Oral 55 16 94 %   11/07/18 0421 122/71 97.2  F (36.2  C) Oral 64 16 93 %   11/06/18 2337 109/73 96.9  F (36.1  C) Oral - 16 91 %     Ranges for vital signs:  Temp:  [96.9  F (36.1  C)-98.7  F (37.1  C)] 97.3  F (36.3  C)  Pulse:  [55-64] 61  Heart Rate:  [52-57] 57  Resp:  [15-16] 15  BP:  (109-128)/(69-78) 128/69  SpO2:  [91 %-95 %] 94 %  Vitals:    11/04/18 1659 11/05/18 1118 11/06/18 0955   Weight: 82.5 kg (181 lb 14.1 oz) 77.8 kg (171 lb 8.3 oz) 78.7 kg (173 lb 8 oz)       Physical Examination:  GENERAL:  well-developed, well-nourished, in bed in no acute distress.   HEAD:  Head is normocephalic, atraumatic   EYES:  Eyes have anicteric sclerae without conjunctival injection   NECK:  Supple  LUNGS:  Clear to auscultation bilaterally from the anterior  CARDIOVASCULAR:  Regular rate and rhythm with no murmurs, gallops or rubs.  ABDOMEN:  Normal bowel sounds, soft, nontender.  SKIN:  No acute rashes. Diffuse, stable skin lesions.  Line in place without any surrounding erythema or exudate.   NEUROLOGIC:  Grossly nonfocal.          Laboratory Data:     Absolute CD4   Date Value Ref Range Status   12/18/2008 898 mm3 Final       Inflammatory Markers  Recent Labs   Lab Test  11/06/18   0647  11/05/18   0739  11/04/18   1214  11/07/17   1207  10/08/14   0835  10/07/14   0733   10/03/14   1513   SED   --   48*  18  27*   --    --    --   52*   CRP  150.0*  160.0*  110.0*  10.3*  71.2*  90.8*   < >  148.0*    < > = values in this interval not displayed.       Immune Globulin Studies   No lab results found.    Metabolic Studies     Recent Labs   Lab Test  11/07/18   0801  11/06/18   0647  11/05/18   0739   11/04/18   1214  10/23/18   1116   10/04/14   0734   02/12/12   1850   NA  140  142  139   --   140  138   < >  137   < >  138   POTASSIUM  3.7  3.6  3.6   --   3.3*  3.7   < >  3.7   < >  3.9   CHLORIDE  104  108  107   --   107  103   < >  105   < >  106   CO2  28  27  24   --   27  28   < >  25   < >  21   ANIONGAP  9  7  8   --   6  7   < >  7   < >  11   BUN  15  13  11   --   10  9   < >  9   < >  16   CR  0.69  0.76  0.69   < >  0.69  0.83   < >  0.84   < >  0.85   GFRESTIMATED  >90  >90  >90   < >  >90  >90   < >  >90  Non  GFR Calc     < >  >90   GLC  82  91  80   --   77  82   < >   95   < >  71   HEMA  8.8  8.7  8.4*   --   8.3*  9.2   < >  8.7   < >  9.3   PHOS   --    --   2.9   --    --    --    --    --    < >   --    MAG   --    --   1.7   --    --    --    < >   --    < >   --    LACT   --    --    --    --   1.1   --    --    --    --   1.1   PCAL   --    --    --    --    --    --    --   0.06   --    --    CKT   --    --    --    --    --   86   --    --    --    --     < > = values in this interval not displayed.       Hepatic Studies  Recent Labs   Lab Test  10/23/18   1116  03/13/18   0934  09/12/17   0923   06/10/14   1044   BILITOTAL  0.8  0.5  0.9   < >  1.2   BILIDELTA   --    --    --    --   0.1   BILICONJ   --    --    --    --   0.0   DBIL  0.2  0.1  0.2   < >   --    ALKPHOS  87  77  85   < >  81   PROTTOTAL  7.4  6.8  7.2   < >  7.2   ALBUMIN  3.5  3.2*  3.3*   < >  3.9   AST  20  24  20   < >  38   ALT  22  27  24   < >  36    < > = values in this interval not displayed.       Pancreatitis testing  Recent Labs   Lab Test  10/23/18   1116   02/12/12   1850   LIPASE   --    --   94   TRIG  97   < >   --     < > = values in this interval not displayed.       Gout Labs    No lab results found.    Hematology Studies    Recent Labs   Lab Test  11/07/18   0801  11/06/18   0647  11/05/18   0739   11/04/18   1214  10/23/18   1116  06/21/18   1154  03/13/18   0934   WBC   --   6.7  9.6   --   9.6  7.8  8.1  8.5   ANEU   --   3.9  6.9   --   6.2   --    --    --    ALYM   --   1.9  1.5   --   1.7   --    --    --    ALISIA   --   0.8  1.1   --   1.7*   --    --    --    AEOS   --   0.1  0.1   --   0.1   --    --    --    HGB   --   11.6*  11.7*   --   12.5*  14.0  12.9*  12.8*   HCT   --   38.4*  38.4*   --   40.4  45.0  40.9  41.8   PLT  350  315  307   < >  307  352  332  343    < > = values in this interval not displayed.       Clotting Studies    Recent Labs   Lab Test  03/13/18   0934  09/12/17   0923  03/10/17   1014  08/30/16   1042   10/03/14   1513   INR  0.95  0.86  0.97   0.99   < >  0.99   PTT   --    --    --    --    --   23    < > = values in this interval not displayed.       Iron Testing  Recent Labs   Lab Test  11/06/18   0647   MCV  93       Markers  Alpha Fetoprotein   Date Value Ref Range Status   07/08/2015 3.8 0 - 8 ug/L Final       Autoimmune Testing  No lab results found.    Invalid input(s): MPO, PRO3, C3, C4, VASPAN    Arterial Blood Gas Testing  No lab results found.     Thyroid Studies     Recent Labs   Lab Test  11/07/17   1207  05/06/15   1505  02/08/11   1022   TSH  1.73  0.56  3.54       Urine Studies   Recent Labs   Lab Test  11/04/18   2128  01/05/11   0916   URINEPH  6.5  6.0   NITRITE  Negative  Negative   LEUKEST  Negative  Negative   WBCU  1  0       Medication levels  Recent Labs   Lab Test  11/06/18   0647  06/21/18   1154   10/05/14   0403   VANCOMYCIN   --    --    --   14.6   CYCLSP   --   <25*   --    --    MPACID  0.55*   --    < >   --    MPAG  29.5*   --    < >   --     < > = values in this interval not displayed.       CSF testing   No lab results found.    Invalid input(s): CADAM, EVPCR, ENTPCR, ENTEROVIRUS    Body fluid stats  No lab results found.    Microbiology:  Fungal testing  No lab results found.    Invalid input(s): HIFUN, FUNGL    Last Culture results with specimen source  Culture Micro   Date Value Ref Range Status   11/04/2018 No growth after 3 days  Preliminary   11/04/2018 No growth after 3 days  Preliminary   05/24/2015   Final    No Salmonella, Shigella, Campylobacter, E. coli O157, Aeromonas, or Plesiomonas   isolated.     10/06/2014 No growth  Final   10/06/2014 No growth  Final   10/04/2014 No growth  Final   10/04/2014 No growth  Final   10/03/2014 No growth  Final   10/03/2014   Final    Cancelled by lab - Specimen never received. Notified Naomi Singh RN (5B) @ 723.    10/05/14     10/03/2014 (A)  Final    Cultured on the 1st day of incubation: Corynebacterium species  Critical Value/Significant Value, preliminary result  only, called to and read   back by Chang King RN on 5B@1831 on [id number]      07/08/2005 No growth  Final   07/08/2005 No anaerobes isolated  Final   06/01/2005 No growth  Final   06/01/2005 No anaerobes isolated  Final    Specimen Description   Date Value Ref Range Status   11/05/2018 Nares  Final   11/04/2018 Blood Left Arm  Final   11/04/2018 Blood Right Hand  Final   04/10/2018 Nasal  Final   02/15/2018 Nasal  Final     Comment:     Swab   05/24/2015 Feces  Final   05/24/2015 Feces  Final   10/07/2014 Urine  Final   10/06/2014 Blood Right Hand  Final   10/06/2014 Blood Right Hand  Final   10/04/2014 Blood Right Hand  Final   10/04/2014 Blood Right Arm  Final   10/03/2014 Blood Right Arm  Final   10/03/2014 Blood  Final   10/03/2014 Blood Right Hand  Final   02/13/2012 Plasma  Final   07/08/2005 Other LEFT FEMORAL CANAL  Final   07/08/2005 Other LEFT FEMORAL CANAL  Final   07/08/2005 Other LEFT FEMORAL CANAL  Final        Last check of C difficile  C Diff Toxin B PCR   Date Value Ref Range Status   05/24/2015 (A) NEG Final    Formed stools are not acceptable, by national guidelines, for the detection of   Clostridium difficile toxin and culture.   Canceled, Test credited  SPOKE WITH ESMER JACKSON.         Syphilis Testing    No results found for: TREPT, TREPAB, RPR, TPPAT, CVD, CVD    Quantiferon testing   Recent Labs   Lab Test  08/30/16   1043   TBRSLT  Negative   TBAGN  0.00       Virology:  CMV viral loads    Recent Labs   Lab Test  09/12/17   0923  02/14/12   0556   CSPEC  Plasma, EDTA anticoagulant  Whole blood, EDTA anticoagulant   CMVLOG  Not Calculated   --        Log IU/mL of CMVQNT   Date Value Ref Range Status   09/12/2017 Not Calculated <2.1 [Log_IU]/mL Final       No results found for: H6RES    EBV DNA Copies/mL   Date Value Ref Range Status   11/06/2018 EBV DNA Not Detected EBVNEG^EBV DNA Not Detected [Copies]/mL Final   08/02/2017 EBV DNA Not Detected EBVNEG [Copies]/mL Final       BK Virus Testing    No results found for: 42841, BKRES    Parvovirus Testing  No lab results found.    Invalid input(s): PRVRES    Adenovirus Testing  No lab results found.    Invalid input(s): ADENAB, ADAG, ADENOVIRUS, ADQT    Hepatitis B Testing   Recent Labs   Lab Test  06/10/14   1044  09/10/13   1025  05/23/13   0855  01/05/11   0909   HBSAB   --    --    --   0.0   HBCAB   --    --    --   Positive*   HEPBANG   --   Positive*   --   Positive*   HBEABY   --   Positive Reference range: Negative (Note) The anti-HBe is repeatedly reactive, which is consistent with recent or remote Hepatitis B infection. Anti-HBe can be present in a small proportion of chronic HBV infections, but its presence usually indicates resolution. If HBeAg is also positive, this may represent the transition between the appearance of anti-HBe and decline of HBeAg, and retesting in one month may be useful. Performed by [facility], [address] [facility] [place] [address] [phone] [email or web address], Villa Chandra MD, Lab. Director*   --   Positive   HBEAGN   --   Negative Reference range: Negative (Note) Performed by [facility], [address] [facility],[place] [id number] [phone] [email or web address], Vilal Chandra MD, Lab. Director   --   Negative   HBQTT  Not Quantified  Unit: IU/mL    32  470,000 Unit: IU/mL  14,000      Hepatitis C Antibody   Date Value Ref Range Status   12/18/2008 Negative NEG Final       CMV IgG Antibody   Date Value Ref Range Status   02/14/2012 <0.20  Negative for anti-CMV IgG U/mL Final     CMV IgM Antibody   Date Value Ref Range Status   02/14/2012 <8.00  No detectable antibody. AU/mL Final     No results found for: EBVCAG, EBIG2, EBIGM, EBVIGG, EBIGG, EBVAGN, SO9114, TOXG  No results found for: H1IGG, H2IGG, EBVCAM    Imaging:  No results found for this or any previous visit (from the past 48 hour(s)).

## 2018-11-08 NOTE — PLAN OF CARE
Problem: Patient Care Overview  Goal: Plan of Care/Patient Progress Review  PT / 7C - Pt OOR for procedure this AM, pt working with other provider this PM. Rescheduled per POC.

## 2018-11-08 NOTE — PROGRESS NOTES
[facility], [facility] Progress Note - [place] Service   Main Plans for Today   - Had aspiration/biopsy of L4-5 bursal cyst with IR, results pending    Assessment & Plan   Jerad Ross is a 58 year old male admitted on 2018. He has a history of  donor renal transplant, skin cancer, chronic back pain, chronic anemia, vitamin D deficiency, essential hypertension, coronary artery disease and is admitted for L4-L5 infected interspinous bursal cyst with adjacent reactive epidural thickening and paraspinous cellulitis     # Acute back pain  # Infected L4-5 interspinous bursal cyst  # Paraspinous cellulitis  IR aspirated cyst , Gram stain and culture pending. Transplant ID following given his history of renal transplant and immunosuppression for help with antibiotics and other management. Symptoms are improving - pain reduced and able to get out of bed and walk with less pain.  Neurosurgery signed off, no surgical interventions needed at this time.  - Transplant ID following, appreciate recs  --- Continue ceftriaxone at meningitic dosing 2 g IV BID today, then switch to ceftriaxone 2g IV daily tomorrow for total of 3-6 weeks of antibiotics (on day 4)  - Likely PICC tomorrow.   --- Recheck CRP on 11/10 to follow per ID recs  - PO pain control  - Probiotic to decrease risk of antibiotic associated diarrhea and C. Diff infection  - Avoid ibuprofen/NSAIDS given renal transplant    # Crackles on lung exam  Likely atelectasis due to lying in hospital bed. No fever, leukocytosis, or cough to suggest a pneumonia. Improved .  - Incentive spirometry  - Encourage frequent walking as able     # DDKT (Baseline creatinine 0.8-1.1)  Cr has been normal at 0.69-0.75 while in hospital.  - Consulted transplant nephrology, appreciate recs  - Continue CellCept at current dose per nephrology because dose is minimal, await transplant ID recs  - Continue PTA prednisone 5mg  daily, consider stress dose steroids if signs of sepsis or hypotension  - Avoid nephrotoxic agents     # Chronic Hepatitis B  Continue PTA entecavir     # Major Depressive Disorder  Continue PTA Fluoxetine     # CAD  # HTN  Continue PTA IMDUR, Plavix, ASA 81, Amlodipine, lipitor     # Restrictive pattern on PFTs  PFTs showing mildly restrictive pattern, seen by pulmonology who said it could be mild asthma.  - Continue PTA advair, PRN albuterol    # Pain Assessment:  Current Pain Score 2018   Patient currently in pain? yes   Pain score (0-10) -   Pain location -   Pain descriptors -   - Jerad is experiencing pain due to lumbar intraspinous septic bursitis. Pain management was discussed and the plan was created in a collaborative fashion.  Jerad's response to the current recommendations: engaged  - Please see the plan for pain management as documented above    Diet: Regular Diet Adult  Fluids: PO  DVT Prophylaxis: Lovenox (held for IR procedure currently)  Code Status: Full Code    Disposition Plan   Expected discharge: 1-2 days, recommended to transitional care unit once adequate pain management/ tolerating PO medications, antibiotic plan established and safe disposition plan/ TCU bed available. Dispo: Expected Discharge Date: 18        Entered: Kaelyn Salguero 2018, 2:55 PM   Information in the above section will display in the discharge planner report.      The patient's care was discussed with the Attending Physician, Dr. Villanueva.    Kaelyn Salguero, MS3  [facility]  Pager: [phone]  Please see sticky note for cross cover information    Hospital Course  Jerad Ross is a 58 year old male with a history of  donor renal transplant (baseline Cr 0.8-1.1), HTN, CAD, chronic cough, anemia in chronic renal disease, vitamin D deficiency, chronic hepatitis B, basal cell carcinoma, squamous cell skin carcinoma, who was admitted 2018  for acute mid-lumbar back pain, L>R, which developed 36 hours PTA and progressively worsened. He also had a fever up to 100.8. Lumbar spine MRI showed L4-5 interspinous bursal cyst with adjacent reactive epidural thickening and paraspinous cellulitis. Neurosurgery consulted, recommended no surgical intervention at this time. IR consulted for biopsy/aspiration of the cyst to clarify diagnosis. Currently on ceftriaxone per transplant ID recommendations.     Interval History   Feeling well this morning. Has been getting up out of bed, which is going pretty well. He uses a walker, which is helpful in steadying him and helping him regain strength. Pain is still present but not as bad as admission. Having BMs. Eating and drinking well. No shortness of breath or other symptoms reported. Has been using incentive spirometer.    Had an episode of bradycardia following IR procedure. Patient had no lightheadedness or dizziness. No nausea/vomiting. Radial pulse felt irregular on exam.    Physical Exam   Vital Signs: Temp: 97.1  F (36.2  C) Temp src: Oral BP: 128/77 Pulse: 57 Heart Rate: 51 Resp: 16 SpO2: 97 % O2 Device: None (Room air)    Weight: 171 lbs 11.2 oz  General: sitting up on edge of bed, appears slightly uncomfortable  HEENT: normocephalic, atraumatic  Respiratory: lungs with mild crackles in the bases bilaterally, improved from 11/7, no wheezes  Cardiac: regular rate and rhythm, S1/S2 heard, no murmurs, rubs, or gallops, radial pulse felt irregular following bradycardic episode  Abdominal: non-distended, normal bowel sounds, soft, non-tender to palpation in all quadrants  Musculoskeletal: no tenderness to palpation of lumbar spinal region of back  Skin: many brown, raised, ~2 mm lesions throughout all exposed skin areas  Neurologic: cranial nerves grossly intact  Psychiatric: appropriate mood and affect    Data     Recent Labs  Lab 11/08/18  0715 11/07/18  0801 11/06/18  0647 11/05/18  0739   WBC 6.6  --  6.7 9.6    HGB 12.1*  --  11.6* 11.7*   MCV 92  --  93 92    350 315 307    140 142 139   POTASSIUM 3.6 3.7 3.6 3.6   CHLORIDE 105 104 108 107   CO2 30 28 27 24   BUN 14 15 13 11   CR 0.74 0.69 0.76 0.69   ANIONGAP 8 9 7 8   HEMA 8.8 8.8 8.7 8.4*   GLC 59* 82 91 80       Recent Results (from the past 24 hour(s))   XR Lumbar Puncture Therapeutic    Narrative    FLUOROSCOPY GUIDED LUMBAR SPINE ASPIRATION    History: Patient with history of L4-L5 septic bursa cyst  aspirate/tissue culture here for fluoroscopy guided aspiration.    Radiologists: LAUREL Carpenter MD ; EMA Carbone MD    Fluoroscopy time: 30 seconds     IV contrast: None.    Complications: None.    Consent: Informed written and verbal consent obtained.    Procedure/Findings: The patient was placed on the fluoroscopy table  and the prone position. The L4-5 interspinous space was identified  under fluoroscopy. The overlying skin was prepped and draped in the  usual sterile fashion. Approximately 10 ml 1% lidocaine was used for  local anesthesia. Under fluoroscopic guidance , a 20 gauge spinal  needle was advanced into the interspinous space at L4-5. Aspiration  was performed and approximately 0.25 mL of viscous fluid was obtained.  The needle was removed and immediate hemostasis was achieved. No  immediate complication.       Impression    Impression: Uncomplicated fluoroscopic-guided aspiration of L4-5  interspinous bursal cyst.    I, ARVIN CARPENTER MD, attest that I was present in the procedure  room for the entire procedure.    I have personally reviewed the examination and initial interpretation  and I agree with the findings.    ARVIN CARPENTER MD     Medications       amLODIPine  5 mg Oral Daily     aspirin  81 mg Oral Daily     atorvastatin  20 mg Oral Daily     cefTRIAXone  2 g Intravenous Q12H     docusate sodium  100 mg Oral BID     entecavir  0.5 mg Oral Daily     FLUoxetine  40 mg Oral QAM     isosorbide mononitrate  15 mg Oral Daily      latanoprost  1 drop Both Eyes At Bedtime     mycophenolate  750 mg Oral BID     omeprazole  20 mg Oral QAM     polyethylene glycol  17 g Oral Daily     predniSONE  5 mg Oral Daily     sodium chloride (PF)  3 mL Intracatheter Q8H     Resident/Fellow Attestation   I, Tree Stahl, was present with the medical student who participated in the service and in the documentation of the note.  I have verified the history and personally performed the physical exam and medical decision making.  I agree with the assessment and plan of care as documented in the note.      Tree Stahl DO, MA  Family Medicine PGY-2  [facility], [facility]

## 2018-11-08 NOTE — PLAN OF CARE
Problem: Patient Care Overview  Goal: Plan of Care/Patient Progress Review  Outcome: Improving  [id number]:  Patient sleeping between cares this evening.  Reports mild to moderate lower back discomfort, reports adequate relief with PRN oxycodone and Tylenol.  Voiding adequate amounts.  Had a BM this evening.  Tolerating regular diet, good appetite.    Patient to have procedure tomorrow in IR, time unknown.  Pre-procedure checklist still needs to be completed.  No scrubs ordered.  NPO after midnight, patient is aware.

## 2018-11-09 ENCOUNTER — APPOINTMENT (OUTPATIENT)
Dept: OCCUPATIONAL THERAPY | Facility: CLINIC | Age: 58
DRG: 558 | End: 2018-11-09
Payer: MEDICARE

## 2018-11-09 ENCOUNTER — APPOINTMENT (OUTPATIENT)
Dept: PHYSICAL THERAPY | Facility: CLINIC | Age: 58
DRG: 558 | End: 2018-11-09
Payer: MEDICARE

## 2018-11-09 LAB
ANION GAP SERPL CALCULATED.3IONS-SCNC: 7 MMOL/L (ref 3–14)
BASOPHILS # BLD AUTO: 0.1 10E9/L (ref 0–0.2)
BASOPHILS NFR BLD AUTO: 0.7 %
BUN SERPL-MCNC: 19 MG/DL (ref 7–30)
CALCIUM SERPL-MCNC: 8.9 MG/DL (ref 8.5–10.1)
CHLORIDE SERPL-SCNC: 104 MMOL/L (ref 94–109)
CO2 SERPL-SCNC: 30 MMOL/L (ref 20–32)
CREAT SERPL-MCNC: 0.75 MG/DL (ref 0.66–1.25)
CRP SERPL-MCNC: 32 MG/L (ref 0–8)
DIFFERENTIAL METHOD BLD: ABNORMAL
EOSINOPHIL # BLD AUTO: 0.3 10E9/L (ref 0–0.7)
EOSINOPHIL NFR BLD AUTO: 3.9 %
ERYTHROCYTE [DISTWIDTH] IN BLOOD BY AUTOMATED COUNT: 15.1 % (ref 10–15)
GFR SERPL CREATININE-BSD FRML MDRD: >90 ML/MIN/1.7M2
GLUCOSE SERPL-MCNC: 77 MG/DL (ref 70–99)
HCT VFR BLD AUTO: 40.5 % (ref 40–53)
HGB BLD-MCNC: 12.4 G/DL (ref 13.3–17.7)
IMM GRANULOCYTES # BLD: 0 10E9/L (ref 0–0.4)
IMM GRANULOCYTES NFR BLD: 0.1 %
LYMPHOCYTES # BLD AUTO: 2.2 10E9/L (ref 0.8–5.3)
LYMPHOCYTES NFR BLD AUTO: 30.3 %
MCH RBC QN AUTO: 28.4 PG (ref 26.5–33)
MCHC RBC AUTO-ENTMCNC: 30.6 G/DL (ref 31.5–36.5)
MCV RBC AUTO: 93 FL (ref 78–100)
MONOCYTES # BLD AUTO: 0.6 10E9/L (ref 0–1.3)
MONOCYTES NFR BLD AUTO: 7.6 %
NEUTROPHILS # BLD AUTO: 4.2 10E9/L (ref 1.6–8.3)
NEUTROPHILS NFR BLD AUTO: 57.4 %
NRBC # BLD AUTO: 0 10*3/UL
NRBC BLD AUTO-RTO: 0 /100
PLATELET # BLD AUTO: 405 10E9/L (ref 150–450)
POTASSIUM SERPL-SCNC: 4.3 MMOL/L (ref 3.4–5.3)
RBC # BLD AUTO: 4.36 10E12/L (ref 4.4–5.9)
SODIUM SERPL-SCNC: 141 MMOL/L (ref 133–144)
WBC # BLD AUTO: 7.2 10E9/L (ref 4–11)

## 2018-11-09 PROCEDURE — 97535 SELF CARE MNGMENT TRAINING: CPT | Mod: GO

## 2018-11-09 PROCEDURE — 97112 NEUROMUSCULAR REEDUCATION: CPT | Mod: GP

## 2018-11-09 PROCEDURE — 40000193 ZZH STATISTIC PT WARD VISIT

## 2018-11-09 PROCEDURE — 36415 COLL VENOUS BLD VENIPUNCTURE: CPT | Performed by: STUDENT IN AN ORGANIZED HEALTH CARE EDUCATION/TRAINING PROGRAM

## 2018-11-09 PROCEDURE — 25000128 H RX IP 250 OP 636: Performed by: FAMILY MEDICINE

## 2018-11-09 PROCEDURE — 40000133 ZZH STATISTIC OT WARD VISIT

## 2018-11-09 PROCEDURE — 25000132 ZZH RX MED GY IP 250 OP 250 PS 637: Mod: GY | Performed by: FAMILY MEDICINE

## 2018-11-09 PROCEDURE — 12000001 ZZH R&B MED SURG/OB UMMC

## 2018-11-09 PROCEDURE — 25000131 ZZH RX MED GY IP 250 OP 636 PS 637: Mod: GY | Performed by: FAMILY MEDICINE

## 2018-11-09 PROCEDURE — 86140 C-REACTIVE PROTEIN: CPT | Performed by: STUDENT IN AN ORGANIZED HEALTH CARE EDUCATION/TRAINING PROGRAM

## 2018-11-09 PROCEDURE — A9270 NON-COVERED ITEM OR SERVICE: HCPCS | Mod: GY | Performed by: FAMILY MEDICINE

## 2018-11-09 PROCEDURE — 25000125 ZZHC RX 250: Performed by: FAMILY MEDICINE

## 2018-11-09 PROCEDURE — 25000132 ZZH RX MED GY IP 250 OP 250 PS 637: Mod: GY | Performed by: STUDENT IN AN ORGANIZED HEALTH CARE EDUCATION/TRAINING PROGRAM

## 2018-11-09 PROCEDURE — 80048 BASIC METABOLIC PNL TOTAL CA: CPT | Performed by: STUDENT IN AN ORGANIZED HEALTH CARE EDUCATION/TRAINING PROGRAM

## 2018-11-09 PROCEDURE — 85025 COMPLETE CBC W/AUTO DIFF WBC: CPT | Performed by: STUDENT IN AN ORGANIZED HEALTH CARE EDUCATION/TRAINING PROGRAM

## 2018-11-09 PROCEDURE — 97116 GAIT TRAINING THERAPY: CPT | Mod: GP

## 2018-11-09 PROCEDURE — A9270 NON-COVERED ITEM OR SERVICE: HCPCS | Mod: GY | Performed by: STUDENT IN AN ORGANIZED HEALTH CARE EDUCATION/TRAINING PROGRAM

## 2018-11-09 RX ORDER — OXYCODONE HYDROCHLORIDE 5 MG/1
5-10 TABLET ORAL EVERY 4 HOURS PRN
Status: DISCONTINUED | OUTPATIENT
Start: 2018-11-09 | End: 2018-11-10 | Stop reason: HOSPADM

## 2018-11-09 RX ORDER — CEFTRIAXONE 2 G/1
2 INJECTION, POWDER, FOR SOLUTION INTRAMUSCULAR; INTRAVENOUS EVERY 24 HOURS
Status: DISCONTINUED | OUTPATIENT
Start: 2018-11-09 | End: 2018-11-10 | Stop reason: HOSPADM

## 2018-11-09 RX ORDER — LIDOCAINE 40 MG/G
CREAM TOPICAL
Status: DISCONTINUED | OUTPATIENT
Start: 2018-11-09 | End: 2018-11-10

## 2018-11-09 RX ORDER — OXYCODONE HYDROCHLORIDE 5 MG/1
5-10 TABLET ORAL EVERY 6 HOURS PRN
Qty: 19 TABLET | Refills: 0 | Status: SHIPPED | OUTPATIENT
Start: 2018-11-09 | End: 2019-09-20

## 2018-11-09 RX ORDER — HEPARIN SODIUM,PORCINE 10 UNIT/ML
2-5 VIAL (ML) INTRAVENOUS
Status: DISCONTINUED | OUTPATIENT
Start: 2018-11-09 | End: 2018-11-10

## 2018-11-09 RX ADMIN — ATORVASTATIN CALCIUM 20 MG: 20 TABLET, FILM COATED ORAL at 08:34

## 2018-11-09 RX ADMIN — Medication 15 MG: at 08:34

## 2018-11-09 RX ADMIN — MYCOPHENOLATE MOFETIL 750 MG: 250 CAPSULE ORAL at 19:51

## 2018-11-09 RX ADMIN — LATANOPROST 1 DROP: 50 SOLUTION OPHTHALMIC at 19:54

## 2018-11-09 RX ADMIN — CEFTRIAXONE SODIUM 2 G: 2 INJECTION, POWDER, FOR SOLUTION INTRAMUSCULAR; INTRAVENOUS at 01:23

## 2018-11-09 RX ADMIN — DOCUSATE SODIUM 100 MG: 100 CAPSULE, LIQUID FILLED ORAL at 19:51

## 2018-11-09 RX ADMIN — MYCOPHENOLATE MOFETIL 750 MG: 250 CAPSULE ORAL at 08:34

## 2018-11-09 RX ADMIN — CEFTRIAXONE SODIUM 2 G: 2 INJECTION, POWDER, FOR SOLUTION INTRAMUSCULAR; INTRAVENOUS at 12:43

## 2018-11-09 RX ADMIN — PREDNISONE 5 MG: 5 TABLET ORAL at 08:34

## 2018-11-09 RX ADMIN — OMEPRAZOLE 20 MG: 20 CAPSULE, DELAYED RELEASE ORAL at 08:34

## 2018-11-09 RX ADMIN — FLUOXETINE HYDROCHLORIDE 40 MG: 20 CAPSULE ORAL at 08:34

## 2018-11-09 RX ADMIN — OXYCODONE HYDROCHLORIDE 10 MG: 5 TABLET ORAL at 08:34

## 2018-11-09 RX ADMIN — POLYETHYLENE GLYCOL 3350 17 G: 17 POWDER, FOR SOLUTION ORAL at 08:34

## 2018-11-09 RX ADMIN — DOCUSATE SODIUM 100 MG: 100 CAPSULE, LIQUID FILLED ORAL at 08:34

## 2018-11-09 RX ADMIN — ACETAMINOPHEN 650 MG: 325 TABLET, FILM COATED ORAL at 08:34

## 2018-11-09 RX ADMIN — ENTECAVIR 0.5 MG: 0.5 TABLET ORAL at 08:34

## 2018-11-09 RX ADMIN — ASPIRIN 81 MG CHEWABLE TABLET 81 MG: 81 TABLET CHEWABLE at 08:34

## 2018-11-09 RX ADMIN — AMLODIPINE BESYLATE 5 MG: 5 TABLET ORAL at 08:34

## 2018-11-09 ASSESSMENT — PAIN DESCRIPTION - DESCRIPTORS: DESCRIPTORS: ACHING

## 2018-11-09 ASSESSMENT — ACTIVITIES OF DAILY LIVING (ADL)
ADLS_ACUITY_SCORE: 11

## 2018-11-09 NOTE — PLAN OF CARE
Problem: Patient Care Overview  Goal: Plan of Care/Patient Progress Review  Discharge Planner PT   Patient plan for discharge: TCU  Current status: pt progressing functional mobility well today. Ambulates x400' no AD, CGA progressing to SBA with continued unsteadiness below baseline. He participates in dynamic balance challenges to progress safe mobility. Kenny bed mobility, IND sit<>stand throughout. Remains limited by pain and fatigue.  Barriers to return to prior living situation: reduced activity tolerance, pain, impaired balance, reduced caregiver assist as wife with other medical needs  Recommendations for discharge: TCU  Rationale for recommendations: pt would benefit from ongoing therapy to progress safe functional mobility towards PLOF as he is currently below baseline and with reduced caregiver assist at home.       Entered by: Yaima Murrieta 11/09/2018 10:02 AM

## 2018-11-09 NOTE — PLAN OF CARE
Problem: Patient Care Overview  Goal: Plan of Care/Patient Progress Review  Outcome: Improving  HD #5 here for superficial tongue bleeding lasting over one week and skin sloughing off tongue. (Currently not bleeding anymore). Mass found on tongue. Pt went down for biopsy this afternoon. Pt also has right femoral IR site covered with tegaderm and gauze. PIV SL. NPO. Denies pain, has tylenol in place if needed. Denies nausea. Heparin gtt stopped this morning for biopsy. UAL, voiding. Last BM yesterday, passing gas. Pt has permanent pacemaker in place. Bedside report: yes.

## 2018-11-09 NOTE — PROGRESS NOTES
General acute hospital, [facility] Progress Note - [place] Service   Main Plans for Today   - Place PICC line  - antibiotic plan for discharge: Ceftriaxone 2g every day for 4 weeks    Assessment & Plan   Jerad Ross is a 58 year old male admitted on 2018. He has a history of  donor renal transplant, skin cancer, chronic back pain, chronic anemia, vitamin D deficiency, essential hypertension, coronary artery disease and is admitted for L4-L5 infected interspinous bursal cyst with adjacent reactive epidural thickening and paraspinous cellulitis.     # Acute back pain  # Infected L4-5 interspinous bursal cyst  # Paraspinous cellulitis  IR aspirated cyst , Gram stain and culture so far without growth (has received several days of antibiotics). Transplant ID following. Symptoms are improving - pain reduced and able to get out of bed and walk with less pain.  Neurosurgery signed off, no surgical interventions needed at this time.  - Transplant ID following, appreciate recs  --- Continue ceftriaxone 2g IV daily for total of 4 weeks (on day 5)  --- Recheck CRP on 11/10 to follow per ID recs  - Place PICC line today  - PO pain control  - Probiotic to decrease risk of antibiotic associated diarrhea and C. Diff infection  - Avoid ibuprofen/NSAIDS given renal transplant    # Crackles on lung exam  Likely atelectasis due to lying in hospital bed. No fever, leukocytosis, or cough to suggest a pneumonia. Now improved.  - Incentive spirometry  - Encourage frequent walking as able    # Uvula swelling  Patient had some uvula swelling and mildly sore throat on morning of . States this happens to him about once per month at home, usually when he sleeps while breathing through his mouth. Improved an hour later on recheck. He had no facial swelling, tongue swelling, or difficulty breathing. No need for acute intervention. Low suspicion for anaphylaxis as patient has been on  ceftriaxone for several days without reaction.  - Monitor     # DDKT (Baseline creatinine 0.8-1.1)  Cr has been normal at 0.69-0.75 while in hospital.  - Consulted transplant nephrology, appreciate recs  - Continue CellCept at current dose per nephrology because dose is minimal, await transplant ID recs  - Continue PTA prednisone 5mg daily, consider stress dose steroids if signs of sepsis or hypotension  - Avoid nephrotoxic agents     # Chronic Hepatitis B  Continue PTA entecavir     # Major Depressive Disorder  Continue PTA Fluoxetine     # CAD  # HTN  Continue PTA IMDUR, Plavix, ASA 81, Amlodipine, lipitor     # Restrictive pattern on PFTs  PFTs showing mildly restrictive pattern, seen by pulmonology who said it could be mild asthma.  - Continue PTA advair, PRN albuterol    # Pain Assessment:  Current Pain Score 11/9/2018   Patient currently in pain? yes   Pain score (0-10) -   Pain location Back   Pain descriptors Aching   - Jerad is experiencing pain due to lumbar intraspinous septic bursitis. Pain management was discussed and the plan was created in a collaborative fashion.  Jerad's response to the current recommendations: engaged  - Please see the plan for pain management as documented above    Diet: Regular Diet Adult  Fluids: PO  DVT Prophylaxis: Lovenox (held for IR procedure currently)  Code Status: Full Code    Disposition Plan   Expected discharge: Tomorrow, recommended to transitional care unit once adequate pain management/ tolerating PO medications, antibiotic plan established and safe disposition plan/ TCU bed available. Dispo: Expected Discharge Date: 11/10/18 (TCU)        Entered: Kaelyn Salguero 11/09/2018, 9:04 AM   Information in the above section will display in the discharge planner report.      The patient's care was discussed with the Attending Physician, Dr. Villanueva.    Kaelyn Salguero, MS3  [facility]  Pager: [phone]  Please see sticky  note for cross cover information    Hospital Course  Jerad Ross is a 58 year old male with a history of  donor renal transplant (baseline Cr 0.8-1.1), HTN, CAD, chronic cough, anemia in chronic renal disease, vitamin D deficiency, chronic hepatitis B, basal cell carcinoma, squamous cell skin carcinoma, who was admitted 2018 for acute mid-lumbar back pain, L>R, which developed 36 hours PTA and progressively worsened. He also had a fever up to 100.8. Lumbar spine MRI showed L4-5 interspinous bursal cyst with adjacent reactive epidural thickening and paraspinous cellulitis. Neurosurgery consulted, recommended no surgical intervention at this time. IR consulted for biopsy/aspiration of the cyst to clarify diagnosis, so far no growth. Currently on ceftriaxone per transplant ID recommendations.     Interval History   Feeling well this morning. Pain continues to improve. Able to walk with PT and OT and to and from the bathroom, sometimes uses walker. No fevers or chills. Woke up with some throat pain and swelling. No difficulty breathing. Improved on recheck an hour later.    Physical Exam   Vital Signs: Temp: 96.2  F (35.7  C) Temp src: Oral BP: 137/84 Pulse: 51 Heart Rate: 62 Resp: 16 SpO2: 95 % O2 Device: None (Room air)    Weight: 171 lbs 11.2 oz  General: laying in bed, no acute distress  HEENT: normocephalic, atraumatic  Respiratory: lungs with mild crackles in the bases bilaterally, no wheezes  Cardiac: regular rate and rhythm, S1/S2 heard, no murmurs, rubs, or gallops  Abdominal: non-distended, normal bowel sounds, soft, non-tender to palpation in all quadrants  Musculoskeletal: no tenderness to palpation of lumbar spinal region of back  Skin: many brown, raised, ~2 mm lesions throughout all exposed skin areas  Neurologic: cranial nerves grossly intact  Psychiatric: appropriate mood and affect    Data     Recent Labs  Lab 18  0736 18  0715 18  0801 18  0647   WBC 7.2 6.6   --  6.7   HGB 12.4* 12.1*  --  11.6*   MCV 93 92  --  93    342 350 315    144 140 142   POTASSIUM 4.3 3.6 3.7 3.6   CHLORIDE 104 105 104 108   CO2 30 30 28 27   BUN 19 14 15 13   CR 0.75 0.74 0.69 0.76   ANIONGAP 7 8 9 7   HEMA 8.9 8.8 8.8 8.7   GLC 77 59* 82 91       Recent Results (from the past 24 hour(s))   XR Lumbar Puncture Therapeutic    Narrative    FLUOROSCOPY GUIDED LUMBAR SPINE ASPIRATION    History: Patient with history of L4-L5 septic bursa cyst  aspirate/tissue culture here for fluoroscopy guided aspiration.    Radiologists: LAUREL Carpenter MD ; EMA Carbone MD    Fluoroscopy time: 30 seconds     IV contrast: None.    Complications: None.    Consent: Informed written and verbal consent obtained.    Procedure/Findings: The patient was placed on the fluoroscopy table  and the prone position. The L4-5 interspinous space was identified  under fluoroscopy. The overlying skin was prepped and draped in the  usual sterile fashion. Approximately 10 ml 1% lidocaine was used for  local anesthesia. Under fluoroscopic guidance , a 20 gauge spinal  needle was advanced into the interspinous space at L4-5. Aspiration  was performed and approximately 0.25 mL of viscous fluid was obtained.  The needle was removed and immediate hemostasis was achieved. No  immediate complication.       Impression    Impression: Uncomplicated fluoroscopic-guided aspiration of L4-5  interspinous bursal cyst.    I, ARVIN CARPENTER MD, attest that I was present in the procedure  room for the entire procedure.    I have personally reviewed the examination and initial interpretation  and I agree with the findings.    ARVIN CARPENTER MD     Medications       amLODIPine  5 mg Oral Daily     aspirin  81 mg Oral Daily     atorvastatin  20 mg Oral Daily     cefTRIAXone  2 g Intravenous Q24H     docusate sodium  100 mg Oral BID     entecavir  0.5 mg Oral Daily     FLUoxetine  40 mg Oral QAM     isosorbide mononitrate  15 mg Oral Daily      latanoprost  1 drop Both Eyes At Bedtime     mycophenolate  750 mg Oral BID     omeprazole  20 mg Oral QAM     polyethylene glycol  17 g Oral Daily     predniSONE  5 mg Oral Daily     sodium chloride (PF)  3 mL Intracatheter Q8H     Resident/Fellow Attestation   I, Tree Stahl, was present with the medical student who participated in the service and in the documentation of the note.  I have verified the history and personally performed the physical exam and medical decision making.  I agree with the assessment and plan of care as documented in the note.      Tree Stahl DO, MA  Family Medicine PGY-2  [facility], [facility]

## 2018-11-09 NOTE — PLAN OF CARE
Problem: Patient Care Overview  Goal: Plan of Care/Patient Progress Review  Outcome: No Change  VSS. Pain comfortably managed with tylenol and oxycodone. Tolerating regular diet. PIV saline locked between Antibiotics. Passing flatus, no BM on shift. Voiding spontaneously up in bathroom, not saving urine. Up with SBA and walker. Continue POC.

## 2018-11-09 NOTE — PLAN OF CARE
Problem: Patient Care Overview  Goal: Plan of Care/Patient Progress Review  Outcome: No Change  VSS. Pain comfortably managed with tylenol and oxycodone. Tolerating regular diet. PIV saline locked between ABX. Passing flatus, +BM. Voiding spont, not saving. Up with SBA and walker. Pt resting comfortably. Continue POC.

## 2018-11-09 NOTE — PROGRESS NOTES
Social Work Services Progress Note    Hospital Day: 5  Date of Initial Social Work Evaluation:  Not completed  Collaborated with:  Pt at bedside, [facility] Admissions ([place] [phone])    Data:  Jerad Ross is a 58 year old male with a history of  donor renal transplant with a baseline creatinine of 0.8-1.1, hypertension, coronary artery disease, chronic cough, anemia in chronic renal disease, vitamin D deficiency, chronic hepatitis B, basal cell carcinoma, squamous cell skin carcinoma, who presents for acute back pain. - SW involved for discharge planning; short TCU stay recommended.    Intervention:  SW met with Pt to discuss discharge planning. Per medical team, Pt likely medically stable to discharge over weekend pending culture results and IV ABX plan to be determined. SW verified with [facility] Admissions (Swapna [phone]; f: [phone]) that Pt is clinically accepted for admission pending unexpected IV ABX plan. [facility] and was informed it will potentially occur over the weekend. Patient anticipates being on IV ABX 4-6 weeks post hospitalization and will likely discharge home from the TCU managing the IV ABX either Outpatient or with IV Home infusion. Pt was offered a private room (add'l $20/day fee) at TCU, Pt declined stating she prefers a male double room with no additional cost. SW reviewed Pt's insurance and discussed TCU coverage and potential copays. Patient agreeable and expressed understanding stating he has had multiple TCU stays in the past following joint surgeries.     SW also discussed discharge transportation. Patient anticipates his wife will be able to transport him as he is SBA. SW discussed [facility] transportation and potential cost, Pt wishes to have family transport and will finalize transport plan when discharge is scheduled.     PAS completed 18 [id number]     [facility]   [address]  [place] [address]  P:[phone]  F:[phone]      Assessment:  Pt is pleasant, alert & oriented, and actively involved in discharge planning. Patient is familiar with TCU goals and anticiapted LOS. Patient is verbalizing his plans post-TCU and presents to be effectively managing his affairs. No additional questions or concerns were expressed.     Plan:    Anticipated Disposition:  Facility:  [facility]    Barriers to d/c plan:  Medical stability    Follow Up:  SW to f/u & assist as needed.    ASAD Lopez, NAMAN   Surgical/Oncology Unit   Phone: [phone]  Pager: [phone]

## 2018-11-09 NOTE — PLAN OF CARE
Problem: Patient Care Overview  Goal: Plan of Care/Patient Progress Review  Outcome: Improving  HD#5 here with acute low back pain, found spinal cyst. IR aspirated cyst yesterday in IR and the fluid is currently being cultured. Hx CAD and kidney txp in 1993. PIV SL tolerating regular diet. Up SBA. Getting tylenol and oxy for pain, denies nausea. Plan is to get PICC line placed for abx upon discharge. Plan is to go home this weekend possibly to TCU. Bedside report: yes.

## 2018-11-09 NOTE — PLAN OF CARE
Problem: Patient Care Overview  Goal: Plan of Care/Patient Progress Review  Discharge Planner OT   Patient plan for discharge: TCU  Current status: Patient is SBA for mobility today, ambulated to bathroom with SBA with VC's to reinforce education provided on body mechanics and maintaining spinal alignment. Further ambulation discontinued today 2/2 fatigue reported. Patient was thoroughly educated in fatigue management, body mechanics, and proper lifting techniques. Pt unable to demo lifting from the floor 2/2 decreased flexibility reported. Th re-educated patient in useful AE such as a reacher. Pt appreciative of all education provided and asked relevant questions.   Barriers to return to prior living situation: pain, fatigue, deconditioning  Recommendations for discharge: TCU  Rationale for recommendations: Patient is below baseline for functional mobility, ADLs/IADLs, and reports that wife has significant medical needs that prevent her from assisting with heavier tasks at home. Patient to benefit from continued skilled instruction to ensure a safe discharge home.        Entered by: Alona Toney 11/09/2018 4:01 PM

## 2018-11-10 ENCOUNTER — APPOINTMENT (OUTPATIENT)
Dept: GENERAL RADIOLOGY | Facility: CLINIC | Age: 58
DRG: 558 | End: 2018-11-10
Payer: MEDICARE

## 2018-11-10 VITALS
HEART RATE: 58 BPM | DIASTOLIC BLOOD PRESSURE: 84 MMHG | TEMPERATURE: 97.1 F | SYSTOLIC BLOOD PRESSURE: 124 MMHG | RESPIRATION RATE: 20 BRPM | WEIGHT: 181.8 LBS | OXYGEN SATURATION: 99 % | BODY MASS INDEX: 27.64 KG/M2

## 2018-11-10 LAB
BACTERIA SPEC CULT: NO GROWTH
BACTERIA SPEC CULT: NO GROWTH
Lab: NORMAL
Lab: NORMAL
SPECIMEN SOURCE: NORMAL
SPECIMEN SOURCE: NORMAL

## 2018-11-10 PROCEDURE — 25000132 ZZH RX MED GY IP 250 OP 250 PS 637: Mod: GY | Performed by: FAMILY MEDICINE

## 2018-11-10 PROCEDURE — A9270 NON-COVERED ITEM OR SERVICE: HCPCS | Mod: GY | Performed by: FAMILY MEDICINE

## 2018-11-10 PROCEDURE — C1751 CATH, INF, PER/CENT/MIDLINE: HCPCS

## 2018-11-10 PROCEDURE — C1769 GUIDE WIRE: HCPCS

## 2018-11-10 PROCEDURE — 25000131 ZZH RX MED GY IP 250 OP 636 PS 637: Mod: GY | Performed by: FAMILY MEDICINE

## 2018-11-10 PROCEDURE — 25000125 ZZHC RX 250: Performed by: FAMILY MEDICINE

## 2018-11-10 PROCEDURE — 25000132 ZZH RX MED GY IP 250 OP 250 PS 637: Mod: GY | Performed by: STUDENT IN AN ORGANIZED HEALTH CARE EDUCATION/TRAINING PROGRAM

## 2018-11-10 PROCEDURE — 36569 INSJ PICC 5 YR+ W/O IMAGING: CPT

## 2018-11-10 PROCEDURE — 71045 X-RAY EXAM CHEST 1 VIEW: CPT

## 2018-11-10 PROCEDURE — A9270 NON-COVERED ITEM OR SERVICE: HCPCS | Mod: GY | Performed by: STUDENT IN AN ORGANIZED HEALTH CARE EDUCATION/TRAINING PROGRAM

## 2018-11-10 PROCEDURE — 25000128 H RX IP 250 OP 636: Performed by: FAMILY MEDICINE

## 2018-11-10 RX ORDER — HEPARIN SODIUM,PORCINE 10 UNIT/ML
2-5 VIAL (ML) INTRAVENOUS
Status: DISCONTINUED | OUTPATIENT
Start: 2018-11-10 | End: 2018-11-10 | Stop reason: HOSPADM

## 2018-11-10 RX ORDER — HEPARIN SODIUM,PORCINE 10 UNIT/ML
2-5 VIAL (ML) INTRAVENOUS
DISCHARGE
Start: 2018-11-10 | End: 2019-09-20

## 2018-11-10 RX ORDER — LIDOCAINE 40 MG/G
CREAM TOPICAL
Status: DISCONTINUED | OUTPATIENT
Start: 2018-11-10 | End: 2018-11-10

## 2018-11-10 RX ORDER — LIDOCAINE 40 MG/G
CREAM TOPICAL
Status: DISCONTINUED | OUTPATIENT
Start: 2018-11-10 | End: 2018-11-10 | Stop reason: HOSPADM

## 2018-11-10 RX ORDER — CEFTRIAXONE 2 G/1
2 INJECTION, POWDER, FOR SOLUTION INTRAMUSCULAR; INTRAVENOUS EVERY 24 HOURS
Qty: 10 EACH | DISCHARGE
Start: 2018-11-10 | End: 2019-05-08

## 2018-11-10 RX ORDER — DOCUSATE SODIUM 100 MG/1
100 CAPSULE, LIQUID FILLED ORAL 2 TIMES DAILY
Qty: 60 CAPSULE | DISCHARGE
Start: 2018-11-10 | End: 2019-09-20

## 2018-11-10 RX ORDER — POLYETHYLENE GLYCOL 3350 17 G/17G
17 POWDER, FOR SOLUTION ORAL DAILY
Qty: 7 PACKET | DISCHARGE
Start: 2018-11-11 | End: 2019-09-20

## 2018-11-10 RX ADMIN — DOCUSATE SODIUM 100 MG: 100 CAPSULE, LIQUID FILLED ORAL at 07:58

## 2018-11-10 RX ADMIN — OMEPRAZOLE 20 MG: 20 CAPSULE, DELAYED RELEASE ORAL at 07:58

## 2018-11-10 RX ADMIN — ATORVASTATIN CALCIUM 20 MG: 20 TABLET, FILM COATED ORAL at 07:58

## 2018-11-10 RX ADMIN — ENTECAVIR 0.5 MG: 0.5 TABLET ORAL at 08:05

## 2018-11-10 RX ADMIN — LIDOCAINE HYDROCHLORIDE 3.5 ML: 10 INJECTION, SOLUTION EPIDURAL; INFILTRATION; INTRACAUDAL; PERINEURAL at 11:14

## 2018-11-10 RX ADMIN — OXYCODONE HYDROCHLORIDE 5 MG: 5 TABLET ORAL at 07:58

## 2018-11-10 RX ADMIN — FLUOXETINE HYDROCHLORIDE 40 MG: 20 CAPSULE ORAL at 07:58

## 2018-11-10 RX ADMIN — PREDNISONE 5 MG: 5 TABLET ORAL at 07:58

## 2018-11-10 RX ADMIN — MYCOPHENOLATE MOFETIL 750 MG: 250 CAPSULE ORAL at 07:58

## 2018-11-10 RX ADMIN — ASPIRIN 81 MG CHEWABLE TABLET 81 MG: 81 TABLET CHEWABLE at 07:58

## 2018-11-10 RX ADMIN — Medication 15 MG: at 08:05

## 2018-11-10 RX ADMIN — CEFTRIAXONE SODIUM 2 G: 2 INJECTION, POWDER, FOR SOLUTION INTRAMUSCULAR; INTRAVENOUS at 11:31

## 2018-11-10 RX ADMIN — AMLODIPINE BESYLATE 5 MG: 5 TABLET ORAL at 07:58

## 2018-11-10 RX ADMIN — ACETAMINOPHEN 650 MG: 325 TABLET, FILM COATED ORAL at 07:58

## 2018-11-10 ASSESSMENT — ENCOUNTER SYMPTOMS
FEVER: 0
SORE THROAT: 1
CHILLS: 0
BACK PAIN: 1
SHORTNESS OF BREATH: 0
COUGH: 0

## 2018-11-10 ASSESSMENT — PAIN DESCRIPTION - DESCRIPTORS: DESCRIPTORS: ACHING

## 2018-11-10 ASSESSMENT — ACTIVITIES OF DAILY LIVING (ADL)
ADLS_ACUITY_SCORE: 11

## 2018-11-10 NOTE — PROGRESS NOTES
[facility]  Transplant Infectious Disease Progress Note -- Sign Off     Patient:  Jerad Ross  YOB: 1960  Medical record number: [id number]         Assessment and Recommendations:   Recommendations:  - Continue the empiric IV ceftriaxone 2 g q 24 hours through 12/4/18 to complete a defined four week course of treatment via PICC line.  (Remove his PICC with discontinuation of the ceftriaxone after the 12/4/18 dose.)  - Check repeat CRP / ESR, CBC with differential / platelets, and comprehensive monitoring panel in about two weeks.  - Schedule him for a follow-up appointment with his primary physician in [facility] in about three+ weeks, around the end of the antibiotic course (with a repeat CRP / ESR check recommended at the end of the course).  - Agree with plan to discharge tomorrow.  - Expecting him to continue to do well, post-discharge follow-up in the Transplant Infectious Disease Clinic should not be needed.    Case was discussed with the [place] Medicine team.  With the above recommendations, Transplant ID will sign off now.  Thanks for allowing us to participate in the care of this gentleman.  Please page if additional ID questions or concerns arise.    Krishna Frye MD  Pager [phone]    Assessment:  - Infected intraspinous bursal cyst with paraspinous cellulitis:  A spine MRI showed evidence of infection in the interspinous bursal cyst with reactive epidural thickening and paraspinous cellulitis, without any obvious drainable fluid. He has had no leukocytosis (WBC 9.6) or elevated lactate 1.1, but a serum CRP peaked at 160.0 on 11/5/18.  He was initially started on empiric IV vancomycin plus ertapenem, but switched to more focused empiric IV ceftriaxone on 11/6/18, with a subsequent steady decrease in his serum CRP down to 32.0 on 11/9/18 and with improvement in his back pain.  His current infection does not appear to penetrate his CNS,  however, a short burst of CNS dosing of ceftriaxone has been given at the start of his course, with plan to discharge on a standard dose of 2 grams IV daily to complete a four week course through 12/4/18.   A cyst aspiration was performed by [facility] on 11/8/18 (unfortunately belatedly after he had been on antibiotics for four days already) with a negative Gram stain and cultures lacking growth to date.  Nevertheless, despite the lack of a specific microbiological pathogen diagnosis, overall he seems to be steadily improving on the empiric ceftriaxone.    Other ID issues:  - QTc interval: No recent EKG available   - Bacterial prophylaxis: On ceftriaxone as above.  - Pneumocystis prophylaxis: None   - Fungal prophylaxis: None   - Isolation status: Routine.    Interval History:  Mr. Ross remains consistently afebrile (T max 98.7 degrees F) since 11/5/18 midnight without chills or sweats and with a stable, normal peripheral WBC of 7.2 this AM.  His serum CRP has decreased nicely from 160.0 on 11/5/18 to 32.0 today on ongoing IV ceftriaxone. A transcutaneous aspiration of his bursal cyst was performed yesterday, 11/8/18, without complication and his back continues to feel more comfortable and less painful today -- it is much improved versus admission.  He has some mild soreness at the aspiration site. He otherwise feels well and is regaining his strength, although is planning to discharge to a TCU (for reconditioning) tomorrow AM after a PICC is placed this evening (or in the AM).  he lacks cough, dyspnea, chest pain, EENT symptoms, nausea, abdominal discomfort, diarrhea, headache of other new complaints today. Gram stain and cultures from the cyst aspiration yesterday are so far negative.  Blood cultures from 11/4/18 remain negative on day 5.          History of Infectious Disease Illness: (copied from the 11/5/18 Transplant ID Consult Note)   A 58-year-old gentleman with a past medical history of ESRD  (secondary to idiopathic FSGS status post  donor kidney transplant in  with subsequent rejection and retransplant in ) on chronic immunosuppression with mycophenolate and prednisone, chronic hepatitis B on entecavir, CAD, hypertension, avascular necrosis of bilateral hip replacements in  status post revision in  and  who presented with 2 days acute onset lower back pain with radiation to the bilateral hips that limited mobility, is worse with weightbearing, and improves with lying on his side.  Patient reports feeling hot and taking his temperature at home with low-grade temperatures of approximately 100.6.  Patient says that intense pain precipitated nausea and emesis.  He denies any fevers or chills.  He further denies any tingling, numbness, urinary or fecal incontinence.  Patient denies any recent infections or antibiotic use.  He does endorse multiple prior scratch wounds to the upper extremities.  He denies any known trauma to the back.  No recent dental disease. No recent IV drug or medication administration.     MRI obtained after admission was significant for an infection of the interspinous bursal cyst with reactive epidural thickening and paraspinous cellulitis.  The radiology report noted that there was no drainable fluid.  Patient was started on vancomycin (-) and ertapenem () given multiple listed prior allergies to antimicrobials including penicillin and his chronically immunosuppressed state.  Patient was evaluated by neurosurgery with recommendations against surgical intervention at this time. Blood cultures obtained on admission are pending.    Transplants:  10/6/1993 (Kidney), 1978 (Kidney), Postoperative day:  9165.  Coordinator Vale Bennett    Review of Systems:  CONSTITUTIONAL:  No fevers or chills   EYES: negative for icterus or acute vision changes   ENT:  negative for acute hearing loss, tinnitus, sore throat   RESPIRATORY:  negative for  cough, sputum or dyspnea   CARDIOVASCULAR:  negative for chest pain, palpitations   GASTROINTESTINAL:  negative for nausea, vomiting, diarrhea or constipation   GENITOURINARY:  negative for dysuria or hematuria   HEME:  No easy bruising or bleeding   INTEGUMENT:  Baseline pruritus with occasional healing erythematous patch and abrasion scattered throughout  NEURO:  Negative for headache or tremor     Past Medical History:   Diagnosis Date     AION (acute ischemic optic neuropathy)      Anemia in chronic renal disease      Avascular necrosis of bones of both hips (H)     s/p bilateral hip replacements     Basal cell carcinoma      Chronic hepatitis B (H)      Clostridium difficile colitis      Coronary artery disease involving native coronary artery of native heart without angina pectoris 6/17/2014    Coronary angiogram 6/17/14: Severe distal 3-vessel disease involving small vessels, not amenable to PCI or CABG.     CRP elevated      Depression      Diverticulosis      Dyslipidemia      FSGS (focal segmental glomerulosclerosis)      Gastric ulcer with hemorrhage 2/12/12     GERD (gastroesophageal reflux disease)      Glaucoma     OHTN     Hemorrhoids      Hypertension secondary to other renal disorders      Hypogonadism in male      Immunosuppressed status (H)      Kidney replaced by transplant     focal glomerulosclerosis      NSTEMI (non-ST elevated myocardial infarction) (H) 6/17/2014     JULIANE (obstructive sleep apnea)     Doesn't use CPAP     Paracentral scotoma     LE     Secondary renal hyperparathyroidism (H)      Squamous cell carcinoma      Past Surgical History:   Procedure Laterality Date     APPENDECTOMY       CATARACT IOL, RT/LT  4/19/2000    RE     CATARACT IOL, RT/LT  6/1/2000    LE     COLECTOMY SUBTOTAL  1983    10 cm, diverticulitis     COLONOSCOPY  2/13/2012    Procedure:COLONOSCOPY; Surgeon:SLOAN GALLARDO; Location: GI     ESOPHAGOSCOPY, GASTROSCOPY, DUODENOSCOPY (EGD), COMBINED   2/13/2012    Procedure:COMBINED ESOPHAGOSCOPY, GASTROSCOPY, DUODENOSCOPY (EGD); Surgeon:SLOAN GALLARDO; Location:[facility]     EXTRACAPSULAR CATARACT EXTRATION WITH INTRAOCULAR LENS IMPLANT  4-20-10, 6-1-10    Rt, Lt     HERNIA REPAIR  1995    Lt inguinal     HIP SURGERY      1981, bilat MITZI, revised 2001, 2005     KIDNEY SURGERY  1978 and 1993    transplant     KNEE SURGERY  1983, 1987    bilat TKA     MOHS MICROGRAPHIC PROCEDURE       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 Wife is also a kidney transplant recipient.     Works part-time     Social History   Substance Use Topics     Smoking status: Former Smoker     Packs/day: 1.00     Years: 9.00     Quit date: 1/1/1988     Smokeless tobacco: Former User     Alcohol use 0.0 oz/week     0 Standard drinks or equivalent per week      Comment: rarely 1 drink per month          Current Medications & Allergies:       amLODIPine  5 mg Oral Daily     aspirin  81 mg Oral Daily     atorvastatin  20 mg Oral Daily     cefTRIAXone  2 g Intravenous Q24H     docusate sodium  100 mg Oral BID     entecavir  0.5 mg Oral Daily     FLUoxetine  40 mg Oral QAM     isosorbide mononitrate  15 mg Oral Daily     latanoprost  1 drop Both Eyes At Bedtime     mycophenolate  750 mg Oral BID     omeprazole  20 mg Oral QAM     polyethylene glycol  17 g Oral Daily     predniSONE  5 mg Oral Daily     sodium chloride (PF)  3 mL Intracatheter Q8H     Allergies   Allergen Reactions     Penicillins Shortness Of Breath and Hives     Keflex [Cephalexin Hcl] Other (See Comments)     Pt could not recall reaction     Tetracycline Other (See Comments)     Patient could not recall reaction     Sulfa Drugs Rash          Physical Exam:   Vitals were reviewed.  All vitals stable.  Patient Vitals for the past 24 hrs:   BP Temp Temp src Pulse Resp SpO2 Weight   11/09/18 1523 127/73 96.5  F (35.8  C) Oral 59 14 97 %  -   11/09/18 1000 - - - - - - 82.5 kg (181 lb 12.8 oz)   11/09/18 0741 137/84 96.2  F (35.7  C) Oral - 16 95 % -   11/09/18 0414 125/74 97.6  F (36.4  C) Oral 51 16 96 % -   11/08/18 2241 134/77 96.4  F (35.8  C) Oral 56 16 94 % -     Ranges for vital signs:  Temp:  [96.2  F (35.7  C)-97.6  F (36.4  C)] 96.5  F (35.8  C)  Pulse:  [51-59] 59  Heart Rate:  [56-62] 62  Resp:  [14-16] 14  BP: (125-137)/(73-84) 127/73  SpO2:  [94 %-97 %] 97 %  Vitals:    11/06/18 0955 11/08/18 1322 11/09/18 1000   Weight: 78.7 kg (173 lb 8 oz) 77.9 kg (171 lb 11.2 oz) 82.5 kg (181 lb 12.8 oz)     Intake/Output Summary (Last 24 hours) at 11/09/18 1901  Last data filed at 11/09/18 1500   Gross per 24 hour   Intake              890 ml   Output              500 ml   Net              390 ml     Physical Examination:  GENERAL:  A pleasant, conversant, WDWN 57 yo man sitting up in a chair in NAD.   HEAD:  NCAT.  EYES:  EOMI, anicteric sclerae.  ENT:  No otorrhea.  No anterior oral lesion.  NECK:  Supple.  LUNGS:  Clear to auscultation bilaterally.  CARDIOVASCULAR:  RRR, no murmur.  ABDOMEN:  Normal bowel sounds, soft, nontender.  SKIN:  Stable diffuse lesions. Small abrasions to left upper extremity.  PIV line site lacks inflammation.  NEUROLOGIC:  Alert, oriented, moves extremities x 4.         Laboratory Data:     Absolute CD4   Date Value Ref Range Status   12/18/2008 898 mm3 Final     Inflammatory Markers    Recent Labs   Lab Test  11/09/18   0736  11/08/18   0715  11/06/18   0647  11/05/18   0739  11/04/18   1214  11/07/17   1207   10/03/14   1513   SED   --    --    --   48*  18  27*   --   52*   CRP  32.0*  52.0*  150.0*  160.0*  110.0*  10.3*   < >  148.0*    < > = values in this interval not displayed.     Metabolic Studies       Recent Labs   Lab Test  11/09/18   0736  11/08/18   0715   11/05/18   0739   11/04/18   1214  10/23/18   1116   10/04/14   0734   02/12/12   1850   NA  141  144   < >  139   --   140  138   < >  137   < >  138    POTASSIUM  4.3  3.6   < >  3.6   --   3.3*  3.7   < >  3.7   < >  3.9   CHLORIDE  104  105   < >  107   --   107  103   < >  105   < >  106   CO2  30  30   < >  24   --   27  28   < >  25   < >  21   ANIONGAP  7  8   < >  8   --   6  7   < >  7   < >  11   BUN  19  14   < >  11   --   10  9   < >  9   < >  16   CR  0.75  0.74   < >  0.69   < >  0.69  0.83   < >  0.84   < >  0.85   GFRESTIMATED  >90  >90   < >  >90   < >  >90  >90   < >  >90  Non  GFR Calc     < >  >90   GLC  77  59*   < >  80   --   77  82   < >  95   < >  71   HEMA  8.9  8.8   < >  8.4*   --   8.3*  9.2   < >  8.7   < >  9.3   PHOS   --    --    --   2.9   --    --    --    --    --    < >   --    MAG   --    --    --   1.7   --    --    --    < >   --    < >   --    LACT   --    --    --    --    --   1.1   --    --    --    --   1.1   PCAL   --    --    --    --    --    --    --    --   0.06   --    --    CKT   --    --    --    --    --    --   86   --    --    --    --     < > = values in this interval not displayed.     Hepatic Studies    Recent Labs   Lab Test  10/23/18   1116  03/13/18   0934  09/12/17   0923   06/10/14   1044   BILITOTAL  0.8  0.5  0.9   < >  1.2   BILIDELTA   --    --    --    --   0.1   BILICONJ   --    --    --    --   0.0   DBIL  0.2  0.1  0.2   < >   --    ALKPHOS  87  77  85   < >  81   PROTTOTAL  7.4  6.8  7.2   < >  7.2   ALBUMIN  3.5  3.2*  3.3*   < >  3.9   AST  20  24  20   < >  38   ALT  22  27  24   < >  36    < > = values in this interval not displayed.     Pancreatitis testing    Recent Labs   Lab Test  10/23/18   1116   02/12/12   1850   LIPASE   --    --   94   TRIG  97   < >   --     < > = values in this interval not displayed.     Hematology Studies      Recent Labs   Lab Test  11/09/18   0736  11/08/18   0715   11/06/18   0647  11/05/18   0739   11/04/18   1214  10/23/18   1116   WBC  7.2  6.6   --   6.7  9.6   --   9.6  7.8   ANEU  4.2  3.6   --   3.9  6.9   --   6.2   --    ALYM  2.2   2.0   --   1.9  1.5   --   1.7   --    ALISIA  0.6  0.7   --   0.8  1.1   --   1.7*   --    AEOS  0.3  0.3   --   0.1  0.1   --   0.1   --    HGB  12.4*  12.1*   --   11.6*  11.7*   --   12.5*  14.0   HCT  40.5  39.0*   --   38.4*  38.4*   --   40.4  45.0   PLT  405  342   < >  315  307   < >  307  352    < > = values in this interval not displayed.     Clotting Studies    Recent Labs   Lab Test  03/13/18   0934  09/12/17   0923  03/10/17   1014  08/30/16   1042   10/03/14   1513   INR  0.95  0.86  0.97  0.99   < >  0.99   PTT   --    --    --    --    --   23    < > = values in this interval not displayed.     Iron Testing    Recent Labs   Lab Test  11/09/18   0736   MCV  93     Markers    Alpha Fetoprotein   Date Value Ref Range Status   07/08/2015 3.8 0 - 8 ug/L Final     Thyroid Studies     Recent Labs   Lab Test  11/07/17   1207  05/06/15   1505  02/08/11   1022   TSH  1.73  0.56  3.54     Urine Studies     Recent Labs   Lab Test  11/04/18   2128  01/05/11   0916   URINEPH  6.5  6.0   NITRITE  Negative  Negative   LEUKEST  Negative  Negative   WBCU  1  0     Medication levels    Recent Labs   Lab Test  11/06/18   0647  06/21/18   1154   10/05/14   0403   VANCOMYCIN   --    --    --   14.6   CYCLSP   --   <25*   --    --    MPACID  0.55*   --    < >   --    MPAG  29.5*   --    < >   --     < > = values in this interval not displayed.     Body fluid stats    Recent Labs   Lab Test  11/08/18   1020   GS  No organisms seen  Rare  WBC'S seen       Microbiology:    Last Culture results with specimen source  Culture Micro   Date Value Ref Range Status   11/08/2018 Culture negative monitoring continues  Preliminary   11/04/2018 No growth after 5 days  Preliminary   11/04/2018 No growth after 5 days  Preliminary   05/24/2015   Final    No Salmonella, Shigella, Campylobacter, E. coli O157, Aeromonas, or Plesiomonas   isolated.     10/06/2014 No growth  Final   10/06/2014 No growth  Final   10/04/2014 No growth  Final    10/04/2014 No growth  Final   10/03/2014 No growth  Final   10/03/2014   Final    Cancelled by lab - Specimen never received. Notified Naomi Singh RN (5B) @ 723.    10/05/14     10/03/2014 (A)  Final    Cultured on the 1st day of incubation: Corynebacterium species  Critical Value/Significant Value, preliminary result only, called to and read   back by Chang King RN on 5B@1831 on [id number]      07/08/2005 No growth  Final   07/08/2005 No anaerobes isolated  Final   06/01/2005 No growth  Final   06/01/2005 No anaerobes isolated  Final    Specimen Description   Date Value Ref Range Status   11/08/2018 Cyst fluid spinal  Final   11/08/2018 Cyst fluid spinal  Final   11/05/2018 Nares  Final   11/04/2018 Blood Left Arm  Final   11/04/2018 Blood Right Hand  Final   04/10/2018 Nasal  Final   02/15/2018 Nasal  Final     Comment:     Swab   05/24/2015 Feces  Final   05/24/2015 Feces  Final   10/07/2014 Urine  Final   10/06/2014 Blood Right Hand  Final   10/06/2014 Blood Right Hand  Final   10/04/2014 Blood Right Hand  Final   10/04/2014 Blood Right Arm  Final   10/03/2014 Blood Right Arm  Final   10/03/2014 Blood  Final   10/03/2014 Blood Right Hand  Final   02/13/2012 Plasma  Final   07/08/2005 Other LEFT FEMORAL CANAL  Final   07/08/2005 Other LEFT FEMORAL CANAL  Final   07/08/2005 Other LEFT FEMORAL CANAL  Final        Last check of C difficile  C Diff Toxin B PCR   Date Value Ref Range Status   05/24/2015 (A) NEG Final    Formed stools are not acceptable, by national guidelines, for the detection of   Clostridium difficile toxin and culture.   Canceled, Test credited  SPOKE WITH ESMER JACKSON.       Quantiferon testing   Recent Labs   Lab Test  08/30/16   1043   TBRSLT  Negative   TBAGN  0.00     Virology:    CMV viral loads  Recent Labs   Lab Test  09/12/17   0923  02/14/12   0556   CSPEC  Plasma, EDTA anticoagulant  Whole blood, EDTA anticoagulant   CMVLOG  Not Calculated   --      Log IU/mL of CMVQNT   Date Value Ref  Range Status   09/12/2017 Not Calculated <2.1 [Log_IU]/mL Final     EBV DNA Copies/mL   Date Value Ref Range Status   11/06/2018 EBV DNA Not Detected EBVNEG^EBV DNA Not Detected [Copies]/mL Final   08/02/2017 EBV DNA Not Detected EBVNEG [Copies]/mL Final     Hepatitis B Testing     Recent Labs   Lab Test  06/10/14   1044  09/10/13   1025  05/23/13   0855  01/05/11   0909   HBSAB   --    --    --   0.0   HBCAB   --    --    --   Positive*   HEPBANG   --   Positive*   --   Positive*   HBEABY   --   Positive Reference range: Negative (Note) The anti-HBe is repeatedly reactive, which is consistent with recent or remote Hepatitis B infection. Anti-HBe can be present in a small proportion of chronic HBV infections, but its presence usually indicates resolution. If HBeAg is also positive, this may represent the transition between the appearance of anti-HBe and decline of HBeAg, and retesting in one month may be useful. Performed by [employer], [address] [place] [id number] [phone] [email or web address], Villa Chandra MD, Lab. Director*   --   Positive   HBEAGN   --   Negative Reference range: Negative (Note) Performed by [employer], [address] [place] [address] [id number] [phone] [email or web address], Villa Chandra MD, Lab. Director   --   Negative   HBQTT  Not Quantified  Unit: IU/mL    32  470,000 Unit: IU/mL  14,000        Hepatitis C Antibody   Date Value Ref Range Status   12/18/2008 Negative NEG Final       CMV IgG Antibody   Date Value Ref Range Status   02/14/2012 <0.20  Negative for anti-CMV IgG U/mL Final     CMV IgM Antibody   Date Value Ref Range Status   02/14/2012 <8.00  No detectable antibody. AU/mL Final     No results found for: EBVCAG, EBIG2, EBIGM, EBVIGG, EBIGG, EBVAGN, XV4974, TOXG  No results found for: H1IGG, H2IGG, EBVCAM    Imaging:  Recent Results (from the past 48 hour(s))   XR Lumbar Puncture Therapeutic    Narrative    FLUOROSCOPY GUIDED LUMBAR SPINE ASPIRATION    History: Patient  with history of L4-L5 septic bursa cyst  aspirate/tissue culture here for fluoroscopy guided aspiration.    Radiologists: LAUREL Carpenter MD ; EMA Carbone MD    Fluoroscopy time: 30 seconds     IV contrast: None.    Complications: None.    Consent: Informed written and verbal consent obtained.    Procedure/Findings: The patient was placed on the fluoroscopy table  and the prone position. The L4-5 interspinous space was identified  under fluoroscopy. The overlying skin was prepped and draped in the  usual sterile fashion. Approximately 10 ml 1% lidocaine was used for  local anesthesia. Under fluoroscopic guidance , a 20 gauge spinal  needle was advanced into the interspinous space at L4-5. Aspiration  was performed and approximately 0.25 mL of viscous fluid was obtained.  The needle was removed and immediate hemostasis was achieved. No  immediate complication.       Impression    Impression: Uncomplicated fluoroscopic-guided aspiration of L4-5  interspinous bursal cyst.    I, ARVIN CARPENTER MD, attest that I was present in the procedure  room for the entire procedure.    I have personally reviewed the examination and initial interpretation  and I agree with the findings.    ARVIN CARPENTER MD

## 2018-11-10 NOTE — PROGRESS NOTES
Social Work Services Discharge Note      Patient Name:  Jerad Ross     Anticipated Discharge Date:  11/10/2018     Discharge Disposition:   TCU:  [facility]   [address] [place] [address]  P:[phone]  F:[phone]    Following MD:  per facility designation     Pre-Admission Screening (PAS) online form has been completed.  The Level of Care (LOC) is:  Determined  Confirmation Code is:  PAS [id number] - not required for [facility] as they are a Medicare Only Facility  Patient/caregiver informed of referral to [facility] Line for Pre-Admission Screening for skilled nursing facility (SNF) placement and to expect a phone call post discharge from SNF.     Additional Services/Equipment Arranged:  none     Patient / Family response to discharge plan:  Patient and wife had been expecting this - per RN patient is ready to transfer once PICC line placed and IV abx run for today     Persons notified of above discharge plan:  Facility ([facility]), RN to update patient and MD    Staff Discharge Instructions:    1) Please fax discharge orders and signed hard scripts for any controlled substances to [phone]     2) Please print a packet and send with patient.     3) Please call RN to RN report to [phone]        CTS Handoff completed:  YES    Patient's wife will arrive at [facility] today 11/10/2018 at 1430 to transport patient to facility as previously arranged with weekday [place] KAMARA LICSW  11/10/2018    ON CALL PAGER   0800 - 1600   [phone]    ON CALL COVERAGE AFTER 1600  [phone]

## 2018-11-10 NOTE — PLAN OF CARE
Problem: Patient Care Overview  Goal: Plan of Care/Patient Progress Review  Outcome: Improving  Assumed care of pt from [id number]. AVSS. A&Ox4. Apical pulse regular. Lungs CTA. Band-Aid over spinal incision removed, site c/d/i. Bowel sounds active in all quadrants. Tolerating regular diet. Voiding spontaneously with adequate output. PIV saline locked. Denies pain at this time. Continue with POC. Pt to discharge to TCU once PICC is placed.

## 2018-11-10 NOTE — PROGRESS NOTES
Discharge instructions reviewed with patient. Verbalized understanding. Pt is discharging to TCU-paperwork and prescriptions faxed to facility. Will be calling for report once patient discharges. Wife will be transporting patient to TCU before 1500 today.

## 2018-11-12 ENCOUNTER — RECORDS - HEALTHEAST (OUTPATIENT)
Dept: LAB | Facility: CLINIC | Age: 58
End: 2018-11-12

## 2018-11-12 ENCOUNTER — OFFICE VISIT - HEALTHEAST (OUTPATIENT)
Dept: GERIATRICS | Facility: CLINIC | Age: 58
End: 2018-11-12

## 2018-11-12 ENCOUNTER — AMBULATORY - HEALTHEAST (OUTPATIENT)
Dept: ADMINISTRATIVE | Facility: CLINIC | Age: 58
End: 2018-11-12

## 2018-11-12 DIAGNOSIS — M71.10 SEPTIC BURSITIS: ICD-10-CM

## 2018-11-12 DIAGNOSIS — F32.A DEPRESSION, UNSPECIFIED DEPRESSION TYPE: ICD-10-CM

## 2018-11-12 DIAGNOSIS — I10 HYPERTENSION, UNSPECIFIED TYPE: ICD-10-CM

## 2018-11-12 DIAGNOSIS — I25.10 CORONARY ARTERY DISEASE, ANGINA PRESENCE UNSPECIFIED, UNSPECIFIED VESSEL OR LESION TYPE, UNSPECIFIED WHETHER NATIVE OR TRANSPLANTED HEART: ICD-10-CM

## 2018-11-12 DIAGNOSIS — B19.10 HEPATITIS B INFECTION WITHOUT DELTA AGENT WITHOUT HEPATIC COMA, UNSPECIFIED CHRONICITY: ICD-10-CM

## 2018-11-12 LAB
C REACTIVE PROTEIN LHE: 4.7 MG/DL (ref 0–0.8)
ERYTHROCYTE [DISTWIDTH] IN BLOOD BY AUTOMATED COUNT: 14.9 % (ref 11–14.5)
HCT VFR BLD AUTO: 36 % (ref 40–54)
HGB BLD-MCNC: 11 G/DL (ref 14–18)
MCH RBC QN AUTO: 27.8 PG (ref 27–34)
MCHC RBC AUTO-ENTMCNC: 30.6 G/DL (ref 32–36)
MCV RBC AUTO: 91 FL (ref 80–100)
PLATELET # BLD AUTO: 375 THOU/UL (ref 140–440)
PMV BLD AUTO: 10.4 FL (ref 8.5–12.5)
RBC # BLD AUTO: 3.96 MILL/UL (ref 4.4–6.2)
WBC: 6.8 THOU/UL (ref 4–11)

## 2018-11-12 NOTE — PLAN OF CARE
Problem: Patient Care Overview  Goal: Plan of Care/Patient Progress Review  Physical Therapy Discharge Summary    Reason for therapy discharge:    Discharged to transitional care facility.    Progress towards therapy goal(s). See goals on Care Plan in [facility] electronic health record for goal details.  Goals partially met.  Barriers to achieving goals:   discharge from facility.    Therapy recommendation(s):    Continued therapy is recommended.  Rationale/Recommendations:  Pt to continue skilled therapy in TCU setting..

## 2018-11-12 NOTE — PLAN OF CARE
Problem: Patient Care Overview  Goal: Plan of Care/Patient Progress Review  Occupational Therapy Discharge Summary    Reason for therapy discharge:    Discharged to transitional care facility.    Progress towards therapy goal(s). See goals on Care Plan in [facility] electronic health record for goal details.  Goals partially met.  Barriers to achieving goals:   discharge from facility.    Therapy recommendation(s):    Continued therapy is recommended.  Rationale/Recommendations:  Pt would benefit from additional therapy to increase safety and independence with IADLs and functional mobility. .

## 2018-11-13 ENCOUNTER — MYC MEDICAL ADVICE (OUTPATIENT)
Dept: OPHTHALMOLOGY | Facility: CLINIC | Age: 58
End: 2018-11-13

## 2018-11-13 LAB
BACTERIA SPEC CULT: NO GROWTH
SPECIMEN SOURCE: NORMAL

## 2018-11-15 ENCOUNTER — OFFICE VISIT - HEALTHEAST (OUTPATIENT)
Dept: GERIATRICS | Facility: CLINIC | Age: 58
End: 2018-11-15

## 2018-11-15 DIAGNOSIS — M71.10 SEPTIC BURSITIS: ICD-10-CM

## 2018-11-15 DIAGNOSIS — I10 HYPERTENSION, UNSPECIFIED TYPE: ICD-10-CM

## 2018-11-15 DIAGNOSIS — F32.A DEPRESSION, UNSPECIFIED DEPRESSION TYPE: ICD-10-CM

## 2018-11-15 DIAGNOSIS — B19.10 HEPATITIS B INFECTION WITHOUT DELTA AGENT WITHOUT HEPATIC COMA, UNSPECIFIED CHRONICITY: ICD-10-CM

## 2018-11-15 DIAGNOSIS — I25.10 CORONARY ARTERY DISEASE, ANGINA PRESENCE UNSPECIFIED, UNSPECIFIED VESSEL OR LESION TYPE, UNSPECIFIED WHETHER NATIVE OR TRANSPLANTED HEART: ICD-10-CM

## 2018-11-15 DIAGNOSIS — Z94.0 KIDNEY TRANSPLANT STATUS, CADAVERIC: ICD-10-CM

## 2018-11-19 ENCOUNTER — OFFICE VISIT - HEALTHEAST (OUTPATIENT)
Dept: GERIATRICS | Facility: CLINIC | Age: 58
End: 2018-11-19

## 2018-11-19 DIAGNOSIS — I10 HYPERTENSION, UNSPECIFIED TYPE: ICD-10-CM

## 2018-11-19 DIAGNOSIS — F32.A DEPRESSION, UNSPECIFIED DEPRESSION TYPE: ICD-10-CM

## 2018-11-19 DIAGNOSIS — M71.10 SEPTIC BURSITIS: ICD-10-CM

## 2018-11-19 DIAGNOSIS — Z94.0 KIDNEY TRANSPLANT STATUS, CADAVERIC: ICD-10-CM

## 2018-11-19 DIAGNOSIS — B19.10 HEPATITIS B INFECTION WITHOUT DELTA AGENT WITHOUT HEPATIC COMA, UNSPECIFIED CHRONICITY: ICD-10-CM

## 2018-11-19 DIAGNOSIS — I25.10 CORONARY ARTERY DISEASE, ANGINA PRESENCE UNSPECIFIED, UNSPECIFIED VESSEL OR LESION TYPE, UNSPECIFIED WHETHER NATIVE OR TRANSPLANTED HEART: ICD-10-CM

## 2018-11-20 ENCOUNTER — AMBULATORY - HEALTHEAST (OUTPATIENT)
Dept: GERIATRICS | Facility: CLINIC | Age: 58
End: 2018-11-20

## 2018-11-23 ENCOUNTER — OFFICE VISIT (OUTPATIENT)
Dept: INTERNAL MEDICINE | Facility: CLINIC | Age: 58
End: 2018-11-23
Payer: MEDICARE

## 2018-11-23 VITALS
TEMPERATURE: 97.8 F | SYSTOLIC BLOOD PRESSURE: 118 MMHG | WEIGHT: 175 LBS | OXYGEN SATURATION: 96 % | BODY MASS INDEX: 26.61 KG/M2 | HEART RATE: 68 BPM | DIASTOLIC BLOOD PRESSURE: 81 MMHG

## 2018-11-23 DIAGNOSIS — I10 BENIGN ESSENTIAL HYPERTENSION: Primary | ICD-10-CM

## 2018-11-23 DIAGNOSIS — I10 BENIGN ESSENTIAL HYPERTENSION: ICD-10-CM

## 2018-11-23 LAB
ALBUMIN SERPL-MCNC: 3.1 G/DL (ref 3.4–5)
ALP SERPL-CCNC: 106 U/L (ref 40–150)
ALT SERPL W P-5'-P-CCNC: 27 U/L (ref 0–70)
ANION GAP SERPL CALCULATED.3IONS-SCNC: 7 MMOL/L (ref 3–14)
AST SERPL W P-5'-P-CCNC: 25 U/L (ref 0–45)
BASOPHILS # BLD AUTO: 0.1 10E9/L (ref 0–0.2)
BASOPHILS NFR BLD AUTO: 1 %
BILIRUB SERPL-MCNC: 0.4 MG/DL (ref 0.2–1.3)
BUN SERPL-MCNC: 12 MG/DL (ref 7–30)
CALCIUM SERPL-MCNC: 8.8 MG/DL (ref 8.5–10.1)
CHLORIDE SERPL-SCNC: 103 MMOL/L (ref 94–109)
CO2 SERPL-SCNC: 27 MMOL/L (ref 20–32)
CREAT SERPL-MCNC: 0.77 MG/DL (ref 0.66–1.25)
CRP SERPL-MCNC: 10.9 MG/L (ref 0–8)
DIFFERENTIAL METHOD BLD: ABNORMAL
EOSINOPHIL # BLD AUTO: 0.2 10E9/L (ref 0–0.7)
EOSINOPHIL NFR BLD AUTO: 2.7 %
ERYTHROCYTE [DISTWIDTH] IN BLOOD BY AUTOMATED COUNT: 14.9 % (ref 10–15)
ERYTHROCYTE [SEDIMENTATION RATE] IN BLOOD BY WESTERGREN METHOD: 18 MM/H (ref 0–20)
GFR SERPL CREATININE-BSD FRML MDRD: >90 ML/MIN/1.7M2
GLUCOSE SERPL-MCNC: 101 MG/DL (ref 70–99)
HCT VFR BLD AUTO: 41.6 % (ref 40–53)
HGB BLD-MCNC: 12.6 G/DL (ref 13.3–17.7)
IMM GRANULOCYTES # BLD: 0 10E9/L (ref 0–0.4)
IMM GRANULOCYTES NFR BLD: 0.2 %
LYMPHOCYTES # BLD AUTO: 1.4 10E9/L (ref 0.8–5.3)
LYMPHOCYTES NFR BLD AUTO: 16.7 %
MCH RBC QN AUTO: 27.3 PG (ref 26.5–33)
MCHC RBC AUTO-ENTMCNC: 30.3 G/DL (ref 31.5–36.5)
MCV RBC AUTO: 90 FL (ref 78–100)
MONOCYTES # BLD AUTO: 0.7 10E9/L (ref 0–1.3)
MONOCYTES NFR BLD AUTO: 9.1 %
NEUTROPHILS # BLD AUTO: 5.7 10E9/L (ref 1.6–8.3)
NEUTROPHILS NFR BLD AUTO: 70.3 %
NRBC # BLD AUTO: 0 10*3/UL
NRBC BLD AUTO-RTO: 0 /100
PLATELET # BLD AUTO: 391 10E9/L (ref 150–450)
POTASSIUM SERPL-SCNC: 3.3 MMOL/L (ref 3.4–5.3)
PROT SERPL-MCNC: 7 G/DL (ref 6.8–8.8)
RBC # BLD AUTO: 4.62 10E12/L (ref 4.4–5.9)
SODIUM SERPL-SCNC: 137 MMOL/L (ref 133–144)
WBC # BLD AUTO: 8.2 10E9/L (ref 4–11)

## 2018-11-23 ASSESSMENT — PAIN SCALES - GENERAL: PAINLEVEL: MODERATE PAIN (4)

## 2018-11-23 NOTE — MR AVS SNAPSHOT
After Visit Summary   11/23/2018    Jerad Ross    MRN: [id number]           Patient Information     Date Of Birth          1960        Visit Information        Provider Department      11/23/2018 1:05 PM Sabino Rico MD [facility]        Today's Diagnoses     Benign essential hypertension    -  1      Care Instructions    Primary Care Center Medication Refill Request Information:  * Please contact your pharmacy regarding ANY request for medication refills.  ** [facility] Prescription Fax = [phone]  * Please allow 3 business days for routine medication refills.  * Please allow 5 business days for controlled substance medication refills.     Primary Care Center Test Result notification information:  *You will be notified with in 7-10 days of your appointment day regarding the results of your test.  If you are on MyChart you will be notified as soon as the provider has reviewed the results and signed off on them.    [facility]: [phone]               Follow-ups after your visit        Your next 10 appointments already scheduled     Nov 26, 2018  9:00 AM CST   (Arrive by 8:45 AM)   Return Visit with Leodan Kern MD   [facility] ([facility] [place])    [address]  4th Floor  [facility] [id number] [phone]            Dec 11, 2018  2:05 PM CST   (Arrive by 1:35 PM)   Return Kidney Transplant with  Kidney/Pancreas Recipient 1   [facility] ([facility] [place])    [address]  [address]  [facility] [id number] [phone]            Dec 13, 2018 10:30 AM CST   (Arrive by 10:15 AM)   Return Visit with Sisi Lockwood MD   [facility] ([facility] [place])    [address]  [address]  [facility] [address] [phone]            Dec 13, 2018 12:30 PM CST   Adult Med Follow UP with Genia Fraser APRN  [employer]   Psychiatry Clinic ([facility])    [facility]  [address] [facility] [address]  [address] [place] [id number] [phone]            Feb 04, 2019  9:45 AM CST   RETURN GLAUCOMA with Viki Rizzo MD   Eye Clinic ([facility])    [address]  9th [address] Clin 9a  [facility] [address] [phone]              Who to contact     Please call your clinic at [phone] to:    Ask questions about your health    Make or cancel appointments    Discuss your medicines    Learn about your test results    Speak to your doctor            Additional Information About Your Visit        [employer] Information     [employer] gives you secure access to your electronic health record. If you see a primary care provider, you can also send messages to your care team and make appointments. If you have questions, please call your primary care clinic.  If you do not have a primary care provider, please call [phone] and they will assist you.      [employer] is an electronic gateway that provides easy, online access to your medical records. With [employer], you can request a clinic appointment, read your test results, renew a prescription or communicate with your care team.     To access your existing account, please contact your [facility] or call [phone] for assistance.        Care EveryWhere ID     This is your Care EveryWhere ID. This could be used by other organizations to access your [place] medical records  [id number]        Your Vitals Were     Pulse Temperature Pulse Oximetry BMI (Body Mass Index)          68 97.8  F (36.6  C) (Oral) 96% 26.61 kg/m2         Blood Pressure from Last 3 Encounters:   11/23/18 118/81   11/10/18 124/84   10/23/18 116/80    Weight from Last 3 Encounters:   11/23/18 79.4 kg (175 lb)   11/09/18 82.5 kg (181 lb 12.8 oz)   10/23/18 79 kg (174 lb 3.2 oz)               Primary Care Provider Office Phone  # Fax #    Aston Perry [phone]       [address] [place] [address]        Equal Access to Services     CARLOS MENDOZA : Rosa Alarcon, wayesyda ariane, qazahirata kajannetda vontalon, nish hdzjanet longoriaezio holloway yajaira salamanca. So [facility] [phone].    ATENCIÓN: Si habla español, tiene a sanchez disposición servicios gratuitos de asistencia lingüística. Llame al [phone].    We comply with applicable federal civil rights laws and [place] laws. We do not discriminate on the basis of race, color, national origin, age, disability, sex, sexual orientation, or gender identity.            Thank you!     Thank you for choosing [facility]  for your care. Our goal is always to provide you with excellent care. Hearing back from our patients is one way we can continue to improve our services. Please take a few minutes to complete the written survey that you may receive in the mail after your visit with us. Thank you!             Your Updated Medication List - Protect others around you: Learn how to safely use, store and throw away your medicines at www.disposemymeds.org.          This list is accurate as of 11/23/18  4:29 PM.  Always use your most recent med list.                   Brand Name Dispense Instructions for use Diagnosis    albuterol 108 (90 Base) MCG/ACT inhaler    PROAIR HFA    1 Inhaler    Inhale 2 puffs into the lungs every 6 hours as needed for shortness of breath / dyspnea or wheezing    Cough, Wheezing       amLODIPine 5 MG tablet    NORVASC    90 tablet    Take 1 tablet (5 mg) by mouth daily    Acute chest pain       aspirin 81 MG tablet    ASA     Take 81 mg by mouth daily        atorvastatin 20 MG tablet    LIPITOR    90 tablet    Take 1 tablet (20 mg) by mouth daily    NSTEMI (non-ST elevated myocardial infarction) (H)       BETA BLOCKER NOT PRESCRIBED (INTENTIONAL)      Beta Blocker not prescribed intentionally due to  Bradycardia < 50 bpm without beta blocker therapy    NSTEMI (non-ST elevated myocardial infarction) (H)       budesonide 90 MCG/ACT inhaler    PULMICORT FLEXHALER    1 each    Inhale 2 puffs into the lungs 2 times daily    Asthma       calcium citrate-vitamin D 315-200 MG-UNIT Tabs per tablet    CITRACAL     Take 1 tablet by mouth daily.        cefTRIAXone 2 GM vial    ROCEPHIN    10 each    Inject 2 g into the vein every 24 hours    Infection of lumbar spine (H), Septic bursitis       CHOLECALCIFEROL PO      Take 1 tablet by mouth daily Dose unknown        clopidogrel 75 MG tablet    PLAVIX    90 tablet    Take 1 tablet (75 mg) by mouth daily    NSTEMI (non-ST elevated myocardial infarction) (H), Coronary artery disease involving native coronary artery of native heart without angina pectoris       docusate sodium 100 MG capsule    COLACE    60 capsule    Take 1 capsule (100 mg) by mouth 2 times daily    Infection of lumbar spine (H), Acute midline low back pain without sciatica       entecavir 0.5 MG tablet    BARACLUDE    90 tablet    Take 1 tablet (0.5 mg) by mouth daily    Chronic viral hepatitis B without delta agent and without coma (H), Chronic hepatitis B (H)       FLUoxetine 40 MG capsule    PROzac    90 capsule    Take 1 capsule (40 mg) by mouth every morning    Major depressive disorder, recurrent episode, moderate (H)       fluticasone 50 MCG/ACT spray    FLONASE    3 Bottle    Spray 1-2 sprays into both nostrils daily    Bronchitis with bronchospasm       fluticasone-salmeterol 250-50 MCG/DOSE inhaler    ADVAIR    60 Inhaler    Inhale 1 puff into the lungs every 12 hours    Cough       heparin lock flush 10 UNIT/ML Soln injection      2-5 mLs by Intracatheter route once as needed for line flush (for locking each dormant lumen with line placement)    Septic bursitis, Infection of lumbar spine (H)       isosorbide mononitrate 30 MG 24 hr tablet    IMDUR    45 tablet    Take 0.5 tablets (15 mg) by mouth  daily    Acute chest pain       latanoprost 0.005 % ophthalmic solution    XALATAN    3 Bottle    Place 1 drop into both eyes At Bedtime    Borderline glaucoma with ocular hypertension, bilateral       mycophenolate 250 MG capsule    GENERIC EQUIVALENT    180 capsule    Take 3 capsules (750 mg) by mouth 2 times daily    Kidney transplanted       OMEGA 3 PO      Take 1 capsule by mouth daily Dose unknown        omeprazole 20 MG DR tablet    priLOSEC OTC    30 tablet    Take  by mouth daily.    Chronic hepatitis B (H), HTN (hypertension), Depression, Unspecified essential hypertension       ONE DAILY MULTIVITAMIN/IRON Tabs      Take 1 tablet by mouth daily        oxyCODONE 5 MG tablet    ROXICODONE    19 tablet    Take 1-2 tablets (5-10 mg) by mouth every 6 hours as needed for moderate to severe pain    Infection of lumbar spine (H)       polyethylene glycol Packet    MIRALAX/GLYCOLAX    7 packet    Take 17 g by mouth daily    Infection of lumbar spine (H), Septic bursitis       predniSONE 5 MG tablet    DELTASONE    90 tablet    Take 1 tablet (5 mg) by mouth daily    Kidney transplanted       TYLENOL 325 MG tablet   Generic drug:  acetaminophen      Take 1-2 tablets by mouth every 6 hours as needed.

## 2018-11-23 NOTE — PATIENT INSTRUCTIONS
[facility] Medication Refill Request Information:  * Please contact your pharmacy regarding ANY request for medication refills.  ** [facility] Prescription Fax = [phone]  * Please allow 3 business days for routine medication refills.  * Please allow 5 business days for controlled substance medication refills.     [facility] Test Result notification information:  *You will be notified with in 7-10 days of your appointment day regarding the results of your test.  If you are on MyChart you will be notified as soon as the provider has reviewed the results and signed off on them.    [facility]: [phone]

## 2018-11-23 NOTE — PROGRESS NOTES
HPI:    Pt. With h/o renal tx. Comes in for hospital follow up of possible lumbar infection. He was discharged on 11/10/2018. He presented with back pain and fever. Overall doing much better. He denies fever, chills. He giving himself home ceftriaxone.  No other HEENT, cardiopulmonary, abdominal, , neurological complaints. His overall back pain is less.     PE:    Vitals noted gen nad cooperative alert, HEENT, oropharynx clear no exudate, neck supple nl rom, LCTA, B, RRR, S1, S2, no MRG, abdomen nt, nd, R upper arm with PICC line (no erythema, no tenderness, and no swelling). Grossly normal neurological exam. Minimal tenderness to palpation to lower back    A/P:    1. Possible lumbar infection from MRI study 11/4/2018. He remains on Ceftriaxone until 12/4/2018. Clinically stable. CRP continues to decrease (today 10.9), ESR was 18 today  2. He will continue to follow in transplant medicine and has appt. 12/11/2018 with Dr. Lovett. Renal function is stable today     I will see him back next Monday and he states he will need PICC line dressing change    Total time spent 25 minutes.  More than 50% of the time spent with Mr. Ross on counseling / coordinating his care

## 2018-11-23 NOTE — NURSING NOTE
Chief Complaint   Patient presents with     Back Pain     back pain follow up per pt      Venecia Lugo at 1:25 PM on 11/23/2018.

## 2018-11-26 ENCOUNTER — ALLIED HEALTH/NURSE VISIT (OUTPATIENT)
Dept: INTERNAL MEDICINE | Facility: CLINIC | Age: 58
End: 2018-11-26
Payer: MEDICARE

## 2018-11-26 DIAGNOSIS — Z48.00 ENCOUNTER FOR CHANGE OF DRESSING: Primary | ICD-10-CM

## 2018-11-26 NOTE — NURSING NOTE
Discussed with Dr Rico, patient will continue with antibiotics, picc line dressing to be replaced today.    Mask applied to staff and patient, last dressing change was 11.20.18. Assisted SM RN to remove old dressing, cleansed site with Povidone-Iodine and alcohol wipe, bacitracin and Tegaderm Film dressing applied in sterile fasion. Skin intact, no redness, swelling or warmth noted.

## 2018-11-26 NOTE — MR AVS SNAPSHOT
After Visit Summary   11/26/2018    Jerad Ross    MRN: [id number]           Patient Information     Date Of Birth          1960        Visit Information        Provider Department      11/26/2018 12:35 PM Nurse, [facility]        Today's Diagnoses     Encounter for change of dressing    -  1       Follow-ups after your visit        Your next 10 appointments already scheduled     Dec 11, 2018  2:05 PM CST   (Arrive by 1:35 PM)   Return Kidney Transplant with  Kidney/Pancreas Recipient 1   [facility] ([facility])    [address]  [address]  [facility] [address]   [phone]            Dec 13, 2018 10:30 AM CST   (Arrive by 10:15 AM)   Return Visit with Sisi Lockwood MD   [facility] ([facility])    [address]  [address]  [facility] [address]   [phone]            Dec 13, 2018 12:30 PM CST   Adult Med Follow UP with RONALDO Dempsey CNP   Psychiatry Clinic ([facility])    [address]  [address] [address] [phone]            Feb 04, 2019  9:45 AM CST   RETURN GLAUCOMA with Viki Rizoz MD   Eye Clinic ([facility])    [address] Clin [address]  [facility] [address] [phone]              Who to contact     Please call your clinic at [phone] to:    Ask questions about your health    Make or cancel appointments    Discuss your medicines    Learn about your test results    Speak to your doctor            Additional Information About Your Visit        MyChart Information     [employer] gives you secure access to your electronic health record. If you see a primary care provider, you can also send messages to your care team and make appointments. If you have questions, please call your primary care clinic.  If  you do not have a primary care provider, please call [phone] and they will assist you.      [employer] is an electronic gateway that provides easy, online access to your medical records. With [employer], you can request a clinic appointment, read your test results, renew a prescription or communicate with your care team.     To access your existing account, please contact your [facility] or call [phone] for assistance.        Care EveryWhere ID     This is your Care EveryWhere ID. This could be used by other organizations to access your [place] medical records  [id number]         Blood Pressure from Last 3 Encounters:   11/23/18 118/81   11/10/18 124/84   10/23/18 116/80    Weight from Last 3 Encounters:   11/23/18 79.4 kg (175 lb)   11/09/18 82.5 kg (181 lb 12.8 oz)   10/23/18 79 kg (174 lb 3.2 oz)              Today, you had the following     No orders found for display       Primary Care Provider Office Phone # Fax #    Aston Perry [phone]       [address] [place] [address]        Equal Access to Services     TOD [facility]. [facility] HospitalMARTIN : Hadii aad ku hadasho Soomaali, waaxda luqadaha, qaybta kaalmada adeegyada, nish salamanca. So [facility] [phone].    ATENCIÓN: Si habla español, tiene a sanchez disposición servicios gratuitos de asistencia lingüística. Llame al [phone].    We comply with applicable federal civil rights laws and [place] laws. We do not discriminate on the basis of race, color, national origin, age, disability, sex, sexual orientation, or gender identity.            Thank you!     Thank you for choosing [facility]  for your care. Our goal is always to provide you with excellent care. Hearing back from our patients is one way we can continue to improve our services. Please take a few minutes to complete the written survey that you may receive in the mail after your visit with us.  Thank you!             Your Updated Medication List - Protect others around you: Learn how to safely use, store and throw away your medicines at www.disposemymeds.org.          This list is accurate as of 11/26/18  5:49 PM.  Always use your most recent med list.                   Brand Name Dispense Instructions for use Diagnosis    albuterol 108 (90 Base) MCG/ACT inhaler    PROAIR HFA    1 Inhaler    Inhale 2 puffs into the lungs every 6 hours as needed for shortness of breath / dyspnea or wheezing    Cough, Wheezing       amLODIPine 5 MG tablet    NORVASC    90 tablet    Take 1 tablet (5 mg) by mouth daily    Acute chest pain       aspirin 81 MG tablet    ASA     Take 81 mg by mouth daily        atorvastatin 20 MG tablet    LIPITOR    90 tablet    Take 1 tablet (20 mg) by mouth daily    NSTEMI (non-ST elevated myocardial infarction) (H)       BETA BLOCKER NOT PRESCRIBED (INTENTIONAL)      Beta Blocker not prescribed intentionally due to Bradycardia < 50 bpm without beta blocker therapy    NSTEMI (non-ST elevated myocardial infarction) (H)       budesonide 90 MCG/ACT inhaler    PULMICORT FLEXHALER    1 each    Inhale 2 puffs into the lungs 2 times daily    Asthma       calcium citrate-vitamin D 315-200 MG-UNIT Tabs per tablet    CITRACAL     Take 1 tablet by mouth daily.        cefTRIAXone 2 GM vial    ROCEPHIN    10 each    Inject 2 g into the vein every 24 hours    Infection of lumbar spine (H), Septic bursitis       CHOLECALCIFEROL PO      Take 1 tablet by mouth daily Dose unknown        clopidogrel 75 MG tablet    PLAVIX    90 tablet    Take 1 tablet (75 mg) by mouth daily    NSTEMI (non-ST elevated myocardial infarction) (H), Coronary artery disease involving native coronary artery of native heart without angina pectoris       docusate sodium 100 MG capsule    COLACE    60 capsule    Take 1 capsule (100 mg) by mouth 2 times daily    Infection of lumbar spine (H), Acute midline low back pain without sciatica        entecavir 0.5 MG tablet    BARACLUDE    90 tablet    Take 1 tablet (0.5 mg) by mouth daily    Chronic viral hepatitis B without delta agent and without coma (H), Chronic hepatitis B (H)       FLUoxetine 40 MG capsule    PROzac    90 capsule    Take 1 capsule (40 mg) by mouth every morning    Major depressive disorder, recurrent episode, moderate (H)       fluticasone 50 MCG/ACT nasal spray    FLONASE    3 Bottle    Spray 1-2 sprays into both nostrils daily    Bronchitis with bronchospasm       fluticasone-salmeterol 250-50 MCG/DOSE inhaler    ADVAIR    60 Inhaler    Inhale 1 puff into the lungs every 12 hours    Cough       heparin lock flush 10 UNIT/ML Soln injection      2-5 mLs by Intracatheter route once as needed for line flush (for locking each dormant lumen with line placement)    Septic bursitis, Infection of lumbar spine (H)       isosorbide mononitrate 30 MG 24 hr tablet    IMDUR    45 tablet    Take 0.5 tablets (15 mg) by mouth daily    Acute chest pain       latanoprost 0.005 % ophthalmic solution    XALATAN    3 Bottle    Place 1 drop into both eyes At Bedtime    Borderline glaucoma with ocular hypertension, bilateral       mycophenolate 250 MG capsule    GENERIC EQUIVALENT    180 capsule    Take 3 capsules (750 mg) by mouth 2 times daily    Kidney transplanted       OMEGA 3 PO      Take 1 capsule by mouth daily Dose unknown        omeprazole 20 MG EC tablet    priLOSEC OTC    30 tablet    Take  by mouth daily.    Chronic hepatitis B (H), HTN (hypertension), Depression, Unspecified essential hypertension       ONE DAILY MULTIVITAMIN/IRON Tabs      Take 1 tablet by mouth daily        oxyCODONE 5 MG tablet    ROXICODONE    19 tablet    Take 1-2 tablets (5-10 mg) by mouth every 6 hours as needed for moderate to severe pain    Infection of lumbar spine (H)       polyethylene glycol Packet    MIRALAX/GLYCOLAX    7 packet    Take 17 g by mouth daily    Infection of lumbar spine (H), Septic bursitis        predniSONE 5 MG tablet    DELTASONE    90 tablet    Take 1 tablet (5 mg) by mouth daily    Kidney transplanted       TYLENOL 325 MG tablet   Generic drug:  acetaminophen      Take 1-2 tablets by mouth every 6 hours as needed.

## 2018-11-26 NOTE — NURSING NOTE
Jerad Ross comes into clinic today at the request of Dr Rico Ordering Provider for pick line dressing change.        This service provided today was under the supervising provider of the day Dr Rico, who was available if needed.    Joslyn Morrow

## 2018-11-27 ENCOUNTER — TELEPHONE (OUTPATIENT)
Dept: INTERNAL MEDICINE | Facility: CLINIC | Age: 58
End: 2018-11-27

## 2018-11-27 DIAGNOSIS — G89.29 OTHER CHRONIC PAIN: Primary | ICD-10-CM

## 2018-11-27 DIAGNOSIS — Z45.2 ENCOUNTER FOR REMOVAL OF PERIPHERALLY INSERTED CENTRAL CATHETER: ICD-10-CM

## 2018-11-27 NOTE — TELEPHONE ENCOUNTER
[facility] Call Center    Phone Message    May a detailed message be left on voicemail: yes    Reason for Call: Other: Pt states he is having the picc line removed next Wednesday and wanted to know if Dr Perry can give a recommendation and pt states he needs a referral to the pain clinic as well.     Action Taken: Message routed to:  [facility] ([facility] Care Clinic

## 2018-11-27 NOTE — TELEPHONE ENCOUNTER
Patient will finish IV antibiotics next Tuesday (12/4/18) is asking if he can get his PICC line removed by next Wednesday (12/5/18) and if so he would like help to coordinate this.    Patient would also like a referral to Pain Clinic, 1st choice would be [facility], if wait is longer than one month he is wondering if he can go elsewhere. Britany Love LPN 11/27/2018 10:06 AM      Pain referral placed  CATINA    OK to remove PICC line when abx finished.  CATINA

## 2018-11-29 NOTE — TELEPHONE ENCOUNTER
I left a message for Jerad, requested callback to further assist with scheduling PICC removal.     Eden Leos RN      ------------------------------    I spoke to Jerad today. He confirmed he does finish antibiotics on the 12/04/18. If possible, he will be at the clinic on 12/04/18 for an appointment and is available that afternoon for removal, though he is unsure if he finishes antibiotics that morning or afternoon/evening. Jerad is available all day on 12/05/18, and he appreciates any assistance with scheduling for the PICC removal.    Jerad confirmed he is taking daily aspirin 81mg, along with daily plavix.    Call placed to IR today ~1650. I left a message for callback to assist in scheduling, order placement.    Eden Leos RN  11/29/2018 4:56 PM    -------------------------------    I received a call from IR today (11/30/18) at 0922. Though orders come up as IR referrals, they do not typically remove PICC lines unless they are complicated cases. OK for removal by nursing staff.     Primary care does not remove PICC lines. I will coordinate with [facility].    Eden Leos RN  11/30/2018 9:24 AM

## 2018-12-03 ENCOUNTER — TELEPHONE (OUTPATIENT)
Dept: INTERNAL MEDICINE | Facility: CLINIC | Age: 58
End: 2018-12-03

## 2018-12-03 NOTE — TELEPHONE ENCOUNTER
Spoke to Juan at  [facility] home infusion [phone] regarding patient's last dose of  ceftriaxone IV's is tomorrow 12/4/18.  [facility] infusion asking if orders is to discontinue or continue medication and/or pull PICC line on this patient. Per RN antibiotic last dose is on 12/5/18 patient has appointment with specialty infusion to remove PICC line on 12/5/18 at 1000. Abdias Andrade LPN at 4:51 PM on 12/3/2018    Left message with Juan at [facility] infusion that antibiotic is completed tomorrow and [facility] can pull picc line if possible, or else specialty infusion to do so. Abdias Andrade LPN at 4:53 PM on 12/3/2018    ----------------------------------------    I spoke to Juan and updated him that plan is to discontinue PICC after antibiotics complete on 12/04/18. Patient does have an appointment to get line pulled on 12/05/18. Juan stated that patient does not typically have a nurse go out, so it would likely be more expensive if [facility] sends a nurse out to pull the line. Patient to keep appointment as scheduled for PICC line removal unless he requests [facility] to pull. If patient prefers [facility] to pull, OK to do so after antibiotics complete on 12/04/18.    Eden Leos RN  12/3/2018 5:06 PM

## 2018-12-04 ENCOUNTER — OFFICE VISIT (OUTPATIENT)
Dept: DERMATOLOGY | Facility: CLINIC | Age: 58
End: 2018-12-04
Payer: MEDICARE

## 2018-12-04 DIAGNOSIS — L60.4 BEAU'S LINES: ICD-10-CM

## 2018-12-04 DIAGNOSIS — L82.1 SK (SEBORRHEIC KERATOSIS): Primary | ICD-10-CM

## 2018-12-04 DIAGNOSIS — D48.5 NEOPLASM OF UNCERTAIN BEHAVIOR OF SKIN: ICD-10-CM

## 2018-12-04 DIAGNOSIS — B07.8 OTHER VIRAL WARTS: ICD-10-CM

## 2018-12-04 PROCEDURE — 88305 TISSUE EXAM BY PATHOLOGIST: CPT | Mod: TC | Performed by: DERMATOLOGY

## 2018-12-04 ASSESSMENT — PAIN SCALES - GENERAL
PAINLEVEL: NO PAIN (0)
PAINLEVEL: MILD PAIN (2)

## 2018-12-04 NOTE — NURSING NOTE
Dermatology Rooming Note    Jerad Ross's goals for this visit include:   Chief Complaint   Patient presents with     Derm Problem     Jerad is here for a transplant askin check. He would like to discuss skin tags and his nail.      Katty Gibson LPN

## 2018-12-04 NOTE — MR AVS SNAPSHOT
After Visit Summary   12/4/2018    Jerad Ross    MRN: [id number]           Patient Information     Date Of Birth          1960        Visit Information        Provider Department      12/4/2018 10:55 AM Foster De Guzman MD [facility] Dermatology        Today's Diagnoses     Neoplasm of uncertain behavior of skin    -  1    Other viral warts          Care Instructions    Urea 40% cream, Cera Ve SA    Avoid using washcloths unless they are being washed regularly     Recommendations for dry skin and dermatitis   1. Bathe or shower daily in lukewarm water  2. Use a gentle non-soap detergent cleanser  - Soaps are alkaline (which can irritate sensitive skin) and remove natural moisturizing factors   - Recommended products, in no particular order, include:   - Bars:    - Aveeno Moisturizing Bar    - Cetaphil Gentle Cleansing Bar    - Dove Sensitive Skin Unscented Beauty Bar    - Olay Ultra Moisture Bar   - Liquid Cleansers:    - Aquanil Cleanser    - CeraVe Hydrating Cleanser    - Cetaphil Gentle Skin Cleanser  - Avoid scented soaps or bath additives unless your doctor tells you otherwise  - Focus on washing the face, underarms, and underwear areas; other sites usually do not need frequent washing  3. Rinse off thoroughly, then pat dry until skin is slightly damp  4. Apply moisturizer to damp skin within 3-5 minutes of exiting the bath/shower  - Recommended products, in no particular order, include:   - Lotions (thinner/lighter, but may be less effective)    - AmLactin Cerapeutic Restoring Body Lotion    - CeraVe Facial Moisturizing Lotion (AM and/or PM)    - Lubriderm Advanced Therapy Lotion   - Creams (thicker, likely the best balance of effectiveness and feel)    - AmLactin Ultra Hydrating Body Cream    - Aveeno Eczema Therapy Moisturizing Cream    - Aveeno Eczema Therapy Itch Relief Balm    - CeraVe Itch Relief Moisturizing Cream   - Ointments (thickest)    - Vaseline  5. If prescribed a  topical steroid medication, this may be applied before or after the moisturizer (whichever order you prefer)  6. Reapply moisturizer one or two additional times throughout the day when dry skin is present; once this improves, reduce to daily or every other day as needed to prevent recurrence  7. If dry skin or dermatitis is present on the hands, keep moisturizer near the sink and apply after washing and drying your hands  8. A humidifier may be helpful during the winter months (when ambient humidity is very low)    Cryotherapy    What is it?    Use of a very cold liquid, such as liquid nitrogen, to freeze and destroy abnormal skin cells that need to be removed    What should I expect?    Tenderness and redness    A small blister that might grow and fill with dark purple blood. There may be crusting.    More than one treatment may be needed if the lesions do not go away.    How do I care for the treated area?    Gently wash the area with your hands when bathing.    Use a thin layer of Vaseline to help with healing. You may use a Band-Aid.     The area should heal within 7-10 days and may leave behind a pink or lighter color.     Do not use an antibiotic or Neosporin ointment.     You may take acetaminophen (Tylenol) for pain.     Call your Doctor if you have:    Severe pain    Signs of infection (warmth, redness, cloudy yellow drainage, and or a bad smell)    Questions or concerns    Who should I call with questions?       [facility]: [phone]       [facility]: [phone]       For urgent needs outside of business hours call the [facility] at [phone]        and ask for the dermatology resident on call    Wound Care After a Biopsy    What is a skin biopsy?  A skin biopsy allows the doctor to examine a very small piece of tissue under the microscope to determine the diagnosis and the best treatment for the skin condition. A local  anesthetic (numbing medicine)  is injected with a very small needle into the skin area to be tested. A small piece of skin is taken from the area. Sometimes a suture (stitch) is used.     What are the risks of a skin biopsy?  I will experience scar, bleeding, swelling, pain, crusting and redness. I may experience incomplete removal or recurrence. Risks of this procedure are excessive bleeding, bruising, infection, nerve damage, numbness, thick (hypertrophic or keloidal) scar and non-diagnostic biopsy.    How should I care for my wound for the first 24 hours?    Keep the wound dry and covered for 24 hours    If it bleeds, hold direct pressure on the area for 15 minutes. If bleeding does not stop then go to the emergency room    Avoid strenuous exercise the first 1-2 days or as your doctor instructs you    How should I care for the wound after 24 hours?    After 24 hours, remove the bandage    You may bathe or shower as normal    If you had a scalp biopsy, you can shampoo as usual and can use shower water to clean the biopsy site daily    Clean the wound twice a day with gentle soap and water    Do not scrub, be gentle    Apply white petroleum/Vaseline after cleaning the wound with a cotton swab or a clean finger, and keep the site covered with a Bandaid /bandage. Bandages are not necessary with a scalp biopsy    If you are unable to cover the site with a Bandaid /bandage, re-apply ointment 2-3 times a day to keep the site moist. Moisture will help with healing    Avoid strenuous activity for first 1-2 days    Avoid lakes, rivers, pools, and oceans until the stitches are removed or the site is healed    How do I clean my wound?    Wash hands thoroughly with soap or use hand  before all wound care    Clean the wound with gentle soap and water    Apply white petroleum/Vaseline  to wound after it is clean    Replace the Bandaid /bandage to keep the wound covered for the first few days or as instructed by your  doctor    If you had a scalp biopsy, warm shower water to the area on a daily basis should suffice    What should I use to clean my wound?     Cotton-tipped applicators (Qtips )    White petroleum jelly (Vaseline ). Use a clean new container and use Q-tips to apply.    Bandaids   as needed    Gentle soap     How should I care for my wound long term?    Do not get your wound dirty    Keep up with wound care for one week or until the area is healed.    A small scab will form and fall off by itself when the area is completely healed. The area will be red and will become pink in color as it heals. Sun protection is very important for how your scar will turn out. Sunscreen with an SPF 30 or greater is recommended once the area is healed.    If you have stitches, stitches need to be removed in 14 days. You may return to our clinic for this or you may have it done locally at your doctor s office.    You should have some soreness but it should be mild and slowly go away over several days. Talk to your doctor about using tylenol for pain,    When should I call my doctor?  If you have increased:     Pain or swelling    Pus or drainage (clear or slightly yellow drainage is ok)    Temperature over 100F    Spreading redness or warmth around wound    When will I hear about my results?  The biopsy results can take 2-3 weeks to come back. The clinic will call you with the results, send you a Lumenzt message, or have you schedule a follow-up clinic or phone time to discuss the results. Contact our clinics if you do not hear from us in 3 weeks.     Who should I call with questions?    [facility]: [phone]     [facility]: [phone]    For urgent needs outside of business hours call the [facility] at [phone] and ask for the dermatology resident on call            Follow-ups after your visit        Your next 10 appointments already scheduled     Dec 05,  2018 10:00 AM CST   Infusion 30 with UC SPEC INFUSION, UC 44 ATC   [facility] ([facility])    [address]  [address]  [place] [address] [phone]            Dec 11, 2018  2:05 PM CST   (Arrive by 1:35 PM)   Return Kidney Transplant with  Kidney/Pancreas Recipient 1   [facility] Nephrology ([facility])    [address]  [address]  [place] [id number] [phone]            Dec 13, 2018 10:30 AM CST   (Arrive by 10:15 AM)   Return Visit with Sisi Lockwood MD   [facility] ([facility])    [address]  [address]  [place] [id number] [phone]            Dec 13, 2018 12:30 PM CST   Adult Med Follow UP with RONALDO Dempsey CNP   Psychiatry Clinic ([facility])    [place] [id number]  [address] [id number] [phone]            Feb 04, 2019  9:45 AM CST   RETURN GLAUCOMA with Viki Rizzo MD   Eye Clinic ([facility])    [address]  [address] [address] [address]  [place] [id number] [phone]              Who to contact     Please call your clinic at [phone] to:    Ask questions about your health    Make or cancel appointments    Discuss your medicines    Learn about your test results    Speak to your doctor            Additional Information About Your Visit        [employer] Information     [employer] gives you secure access to your electronic health record. If you see a primary care provider, you can also send messages to your care team and make appointments. If you have questions, please call your primary care clinic.  If you do not have a primary care provider, please call [phone] and they will assist you.      [employer] is an electronic gateway that provides easy, online access to your medical  records. With [employer], you can request a clinic appointment, read your test results, renew a prescription or communicate with your care team.     To access your existing account, please contact your [facility] or call [phone] for assistance.        Care EveryWhere ID     This is your Care EveryWhere ID. This could be used by other organizations to access your [place] medical records  [id number]         Blood Pressure from Last 3 Encounters:   11/23/18 118/81   11/10/18 124/84   10/23/18 116/80    Weight from Last 3 Encounters:   11/23/18 79.4 kg (175 lb)   11/09/18 82.5 kg (181 lb 12.8 oz)   10/23/18 79 kg (174 lb 3.2 oz)              We Performed the Following     Dermatological path order and indications     DESTRUCT BENIGN LESION, UP TO 14        Primary Care Provider Office Phone # Fax #    Aston Perry [phone]       [address] [facility] [address]        Equal Access to Services     TOD [facility] : Hadii aad ku hadasho Soomaali, waaxda luqadaha, qaybta kaalmada adeegyada, waxay idiin haywojciechn [facility] . So [facility] [phone].    ATENCIÓN: Si habla español, tiene a sanchez disposición servicios gratuitos de asistencia lingüística. Llame al [phone].    We comply with applicable federal civil rights laws and [place] laws. We do not discriminate on the basis of race, color, national origin, age, disability, sex, sexual orientation, or gender identity.            Thank you!     Thank you for choosing [facility] DERMATOLOGY  for your care. Our goal is always to provide you with excellent care. Hearing back from our patients is one way we can continue to improve our services. Please take a few minutes to complete the written survey that you may receive in the mail after your visit with us. Thank you!             Your Updated Medication List - Protect others around you: Learn how to safely use, store and throw away your medicines at  www.disposemymeds.org.          This list is accurate as of 12/4/18 11:28 AM.  Always use your most recent med list.                   Brand Name Dispense Instructions for use Diagnosis    albuterol 108 (90 Base) MCG/ACT inhaler    PROAIR HFA    1 Inhaler    Inhale 2 puffs into the lungs every 6 hours as needed for shortness of breath / dyspnea or wheezing    Cough, Wheezing       amLODIPine 5 MG tablet    NORVASC    90 tablet    Take 1 tablet (5 mg) by mouth daily    Acute chest pain       aspirin 81 MG tablet    ASA     Take 81 mg by mouth daily        atorvastatin 20 MG tablet    LIPITOR    90 tablet    Take 1 tablet (20 mg) by mouth daily    NSTEMI (non-ST elevated myocardial infarction) (H)       BETA BLOCKER NOT PRESCRIBED    INTENTIONAL     Beta Blocker not prescribed intentionally due to Bradycardia < 50 bpm without beta blocker therapy    NSTEMI (non-ST elevated myocardial infarction) (H)       budesonide 90 MCG/ACT inhaler    PULMICORT FLEXHALER    1 each    Inhale 2 puffs into the lungs 2 times daily    Asthma       calcium citrate-vitamin D 315-200 MG-UNIT Tabs per tablet    CITRACAL     Take 1 tablet by mouth daily.        cefTRIAXone 2 GM vial    ROCEPHIN    10 each    Inject 2 g into the vein every 24 hours    Infection of lumbar spine (H), Septic bursitis       CHOLECALCIFEROL PO      Take 1 tablet by mouth daily Dose unknown        clopidogrel 75 MG tablet    PLAVIX    90 tablet    Take 1 tablet (75 mg) by mouth daily    NSTEMI (non-ST elevated myocardial infarction) (H), Coronary artery disease involving native coronary artery of native heart without angina pectoris       docusate sodium 100 MG capsule    COLACE    60 capsule    Take 1 capsule (100 mg) by mouth 2 times daily    Infection of lumbar spine (H), Acute midline low back pain without sciatica       entecavir 0.5 MG tablet    BARACLUDE    90 tablet    Take 1 tablet (0.5 mg) by mouth daily    Chronic viral hepatitis B without delta agent  and without coma (H), Chronic hepatitis B (H)       FLUoxetine 40 MG capsule    PROzac    90 capsule    Take 1 capsule (40 mg) by mouth every morning    Major depressive disorder, recurrent episode, moderate (H)       fluticasone 50 MCG/ACT nasal spray    FLONASE    3 Bottle    Spray 1-2 sprays into both nostrils daily    Bronchitis with bronchospasm       fluticasone-salmeterol 250-50 MCG/DOSE inhaler    ADVAIR    60 Inhaler    Inhale 1 puff into the lungs every 12 hours    Cough       heparin lock flush 10 UNIT/ML Soln injection      2-5 mLs by Intracatheter route once as needed for line flush (for locking each dormant lumen with line placement)    Septic bursitis, Infection of lumbar spine (H)       isosorbide mononitrate 30 MG 24 hr tablet    IMDUR    45 tablet    Take 0.5 tablets (15 mg) by mouth daily    Acute chest pain       latanoprost 0.005 % ophthalmic solution    XALATAN    3 Bottle    Place 1 drop into both eyes At Bedtime    Borderline glaucoma with ocular hypertension, bilateral       mycophenolate 250 MG capsule    GENERIC EQUIVALENT    180 capsule    Take 3 capsules (750 mg) by mouth 2 times daily    Kidney transplanted       OMEGA 3 PO      Take 1 capsule by mouth daily Dose unknown        omeprazole 20 MG EC tablet    priLOSEC OTC    30 tablet    Take  by mouth daily.    Chronic hepatitis B (H), HTN (hypertension), Depression, Unspecified essential hypertension       ONE DAILY MULTIVITAMIN/IRON Tabs      Take 1 tablet by mouth daily        oxyCODONE 5 MG tablet    ROXICODONE    19 tablet    Take 1-2 tablets (5-10 mg) by mouth every 6 hours as needed for moderate to severe pain    Infection of lumbar spine (H)       polyethylene glycol packet    MIRALAX/GLYCOLAX    7 packet    Take 17 g by mouth daily    Infection of lumbar spine (H), Septic bursitis       predniSONE 5 MG tablet    DELTASONE    90 tablet    Take 1 tablet (5 mg) by mouth daily    Kidney transplanted       TYLENOL 325 MG tablet    Generic drug:  acetaminophen      Take 1-2 tablets by mouth every 6 hours as needed.

## 2018-12-04 NOTE — PATIENT INSTRUCTIONS
Urea 40% cream, Cera Ve SA    Avoid using washcloths unless they are being washed regularly     Recommendations for dry skin and dermatitis   1. Bathe or shower daily in lukewarm water  2. Use a gentle non-soap detergent cleanser  - Soaps are alkaline (which can irritate sensitive skin) and remove natural moisturizing factors   - Recommended products, in no particular order, include:   - Bars:    - Aveeno Moisturizing Bar    - Cetaphil Gentle Cleansing Bar    - Dove Sensitive Skin Unscented Beauty Bar    - Olay Ultra Moisture Bar   - Liquid Cleansers:    - Aquanil Cleanser    - CeraVe Hydrating Cleanser    - Cetaphil Gentle Skin Cleanser  - Avoid scented soaps or bath additives unless your doctor tells you otherwise  - Focus on washing the face, underarms, and underwear areas; other sites usually do not need frequent washing  3. Rinse off thoroughly, then pat dry until skin is slightly damp  4. Apply moisturizer to damp skin within 3-5 minutes of exiting the bath/shower  - Recommended products, in no particular order, include:   - Lotions (thinner/lighter, but may be less effective)    - AmLactin Cerapeutic Restoring Body Lotion    - CeraVe Facial Moisturizing Lotion (AM and/or PM)    - Lubriderm Advanced Therapy Lotion   - Creams (thicker, likely the best balance of effectiveness and feel)    - AmLactin Ultra Hydrating Body Cream    - Aveeno Eczema Therapy Moisturizing Cream    - Aveeno Eczema Therapy Itch Relief Balm    - CeraVe Itch Relief Moisturizing Cream   - Ointments (thickest)    - Vaseline  5. If prescribed a topical steroid medication, this may be applied before or after the moisturizer (whichever order you prefer)  6. Reapply moisturizer one or two additional times throughout the day when dry skin is present; once this improves, reduce to daily or every other day as needed to prevent recurrence  7. If dry skin or dermatitis is present on the hands, keep moisturizer near the sink and apply after washing  and drying your hands  8. A humidifier may be helpful during the winter months (when ambient humidity is very low)    Cryotherapy    What is it?    Use of a very cold liquid, such as liquid nitrogen, to freeze and destroy abnormal skin cells that need to be removed    What should I expect?    Tenderness and redness    A small blister that might grow and fill with dark purple blood. There may be crusting.    More than one treatment may be needed if the lesions do not go away.    How do I care for the treated area?    Gently wash the area with your hands when bathing.    Use a thin layer of Vaseline to help with healing. You may use a Band-Aid.     The area should heal within 7-10 days and may leave behind a pink or lighter color.     Do not use an antibiotic or Neosporin ointment.     You may take acetaminophen (Tylenol) for pain.     Call your Doctor if you have:    Severe pain    Signs of infection (warmth, redness, cloudy yellow drainage, and or a bad smell)    Questions or concerns    Who should I call with questions?       [facility]: [phone]       [facility]: [phone]       For urgent needs outside of business hours call the [facility] at [phone]        and ask for the dermatology resident on call    Wound Care After a Biopsy    What is a skin biopsy?  A skin biopsy allows the doctor to examine a very small piece of tissue under the microscope to determine the diagnosis and the best treatment for the skin condition. A local anesthetic (numbing medicine)  is injected with a very small needle into the skin area to be tested. A small piece of skin is taken from the area. Sometimes a suture (stitch) is used.     What are the risks of a skin biopsy?  I will experience scar, bleeding, swelling, pain, crusting and redness. I may experience incomplete removal or recurrence. Risks of this procedure are excessive bleeding, bruising,  infection, nerve damage, numbness, thick (hypertrophic or keloidal) scar and non-diagnostic biopsy.    How should I care for my wound for the first 24 hours?    Keep the wound dry and covered for 24 hours    If it bleeds, hold direct pressure on the area for 15 minutes. If bleeding does not stop then go to the emergency room    Avoid strenuous exercise the first 1-2 days or as your doctor instructs you    How should I care for the wound after 24 hours?    After 24 hours, remove the bandage    You may bathe or shower as normal    If you had a scalp biopsy, you can shampoo as usual and can use shower water to clean the biopsy site daily    Clean the wound twice a day with gentle soap and water    Do not scrub, be gentle    Apply white petroleum/Vaseline after cleaning the wound with a cotton swab or a clean finger, and keep the site covered with a Bandaid /bandage. Bandages are not necessary with a scalp biopsy    If you are unable to cover the site with a Bandaid /bandage, re-apply ointment 2-3 times a day to keep the site moist. Moisture will help with healing    Avoid strenuous activity for first 1-2 days    Avoid lakes, rivers, pools, and oceans until the stitches are removed or the site is healed    How do I clean my wound?    Wash hands thoroughly with soap or use hand  before all wound care    Clean the wound with gentle soap and water    Apply white petroleum/Vaseline  to wound after it is clean    Replace the Bandaid /bandage to keep the wound covered for the first few days or as instructed by your doctor    If you had a scalp biopsy, warm shower water to the area on a daily basis should suffice    What should I use to clean my wound?     Cotton-tipped applicators (Qtips )    White petroleum jelly (Vaseline ). Use a clean new container and use Q-tips to apply.    Bandaids   as needed    Gentle soap     How should I care for my wound long term?    Do not get your wound dirty    Keep up with wound care  for one week or until the area is healed.    A small scab will form and fall off by itself when the area is completely healed. The area will be red and will become pink in color as it heals. Sun protection is very important for how your scar will turn out. Sunscreen with an SPF 30 or greater is recommended once the area is healed.    If you have stitches, stitches need to be removed in 14 days. You may return to our clinic for this or you may have it done locally at your doctor s office.    You should have some soreness but it should be mild and slowly go away over several days. Talk to your doctor about using tylenol for pain,    When should I call my doctor?  If you have increased:     Pain or swelling    Pus or drainage (clear or slightly yellow drainage is ok)    Temperature over 100F    Spreading redness or warmth around wound    When will I hear about my results?  The biopsy results can take 2-3 weeks to come back. The clinic will call you with the results, send you a [facility] message, or have you schedule a follow-up clinic or phone time to discuss the results. Contact our clinics if you do not hear from us in 3 weeks.     Who should I call with questions?    [facility]: [phone]     [facility]: [phone]    For urgent needs outside of business hours call the [facility] at [phone] and ask for the dermatology resident on call

## 2018-12-04 NOTE — NURSING NOTE
Lidocaine-epinephrine 1-1:559537 % injection   1.5mL once for one use, starting 12/4/2018 ending 12/4/2018,  2mL disp, R-0, injection  Injected by Belle Dodd CMA

## 2018-12-04 NOTE — LETTER
12/4/2018       RE: Jerad Ross  [address]  [facility] [address]     Dear Colleague,    Thank you for referring your patient, Jerad Ross, to the [facility]ERMAT[facility] at [facility]. Please see a copy of my visit note below.        [facility] Dermatology Note      Dermatology Problem List:  1.History of kidney transplant 10/6/1993  - Immunosuppression: cyclosporine, imuran, prednisone   2. History of BCC - mid chest x 2, right axilla x 1 s/p excision   3. Inflamed seborrheic and verrucous keratoses s/p cryo  4. Intertrigo - hydrocortisone 2.5% ointment  5. Filiform warts - left lateral eyelid, left alteral canthus, left temple s/p cryo 3/15/17    Encounter Date: Dec 4, 2018    CC:  No chief complaint on file.        History of Present Illness:  Mr. Jerad Ross is a 58 year old male who presents for a transplant skin check. He was last seen on 3/15/17 when 4 filiform warts were treated with cryotherapy and 4 irritated SKs were also treated with cryotherapy. Today, the patient notes multiple lesions of concern on his arms that bleed occasionally and that he has been scratching. He also notes a lesion of concern on his left flank and concern about the nail on his right 3rd finger that he would like evaluated. The patient voices no other concerns.        Past Medical History:   Patient Active Problem List   Diagnosis     BCC (basal cell carcinoma), trunk     JULIANE (obstructive sleep apnea)     Hypertension secondary to other renal disorders     Coronary artery disease involving native coronary artery of native heart without angina pectoris     NSTEMI (non-ST elevated myocardial infarction) (H)     Depression     Diverticulosis     Hemorrhoids     Kidney replaced by transplant     Basal cell carcinoma     Squamous cell carcinoma     Dyslipidemia     CRP elevated     Glaucoma     AION (acute ischemic optic neuropathy)      Paracentral scotoma     Hip pain     Inflamed seborrheic keratosis     Intertrigo     Chronic hepatitis B (H)     Immunosuppressed status (H)     Hypogonadism in male     GERD (gastroesophageal reflux disease)     Aftercare following organ transplant     Acute midline low back pain without sciatica     Septic bursitis     Impaired mobility     Infection of lumbar spine (H)     Past Medical History:   Diagnosis Date     AION (acute ischemic optic neuropathy)      Anemia in chronic renal disease      Avascular necrosis of bones of both hips (H)     s/p bilateral hip replacements     Basal cell carcinoma      Chronic hepatitis B (H)      Clostridium difficile colitis      Coronary artery disease involving native coronary artery of native heart without angina pectoris 6/17/2014    Coronary angiogram 6/17/14: Severe distal 3-vessel disease involving small vessels, not amenable to PCI or CABG.     CRP elevated      Depression      Diverticulosis      Dyslipidemia      FSGS (focal segmental glomerulosclerosis)      Gastric ulcer with hemorrhage 2/12/12     GERD (gastroesophageal reflux disease)      Glaucoma     OHTN     Hemorrhoids      Hypertension secondary to other renal disorders      Hypogonadism in male      Immunosuppressed status (H)      Kidney replaced by transplant     focal glomerulosclerosis      NSTEMI (non-ST elevated myocardial infarction) (H) 6/17/2014     JULIANE (obstructive sleep apnea)     Doesn't use CPAP     Paracentral scotoma     LE     Secondary renal hyperparathyroidism (H)      Squamous cell carcinoma      Past Surgical History:   Procedure Laterality Date     APPENDECTOMY       CATARACT IOL, RT/LT  4/19/2000    RE     CATARACT IOL, RT/LT  6/1/2000    LE     COLECTOMY SUBTOTAL  1983    10 cm, diverticulitis     COLONOSCOPY  2/13/2012    Procedure:COLONOSCOPY; Surgeon:SLOAN GALLARDO; Location: GI     ESOPHAGOSCOPY, GASTROSCOPY, DUODENOSCOPY (EGD), COMBINED  2/13/2012     Procedure:COMBINED ESOPHAGOSCOPY, GASTROSCOPY, DUODENOSCOPY (EGD); Surgeon:SLOAN GALLARDO; Location:[facility]     EXTRACAPSULAR CATARACT EXTRATION WITH INTRAOCULAR LENS IMPLANT  4-20-10, 6-1-10    Rt, Lt     HERNIA REPAIR  1995    Lt inguinal     HIP SURGERY      1981, bilat MITZI, revised 2001, 2005     KIDNEY SURGERY  1978 and 1993    transplant     KNEE SURGERY  1983, 1987    bilat TKA     MOHS MICROGRAPHIC PROCEDURE       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, auxiliary spleen     TONSILLECTOMY         Social History:  Patient reports that he quit smoking about 30 years ago. He has a 9.00 pack-year smoking history. He has quit using smokeless tobacco. He reports that he drinks alcohol. He reports that he does not use illicit drugs.    Family History:  Family History   Problem Relation Age of Onset     Cardiovascular Father      AI with valve repair     Hypertension Father      KIDNEY DISEASE Father      Cancer Paternal Grandmother      ovarian ca     Cerebrovascular Disease Paternal Grandfather      Cerebrovascular Disease Maternal Grandfather      KIDNEY DISEASE Paternal Aunt      Skin Cancer No family hx of      Glaucoma No family hx of      Macular Degeneration No family hx of      Amblyopia No family hx of        Medications:  Current Outpatient Prescriptions   Medication Sig Dispense Refill     acetaminophen (TYLENOL) 325 MG tablet Take 1-2 tablets by mouth every 6 hours as needed.       albuterol (PROAIR HFA) 108 (90 Base) MCG/ACT Inhaler Inhale 2 puffs into the lungs every 6 hours as needed for shortness of breath / dyspnea or wheezing 1 Inhaler 2     amLODIPine (NORVASC) 5 MG tablet Take 1 tablet (5 mg) by mouth daily 90 tablet 1     aspirin 81 MG tablet Take 81 mg by mouth daily        atorvastatin (LIPITOR) 20 MG tablet Take 1 tablet (20 mg) by mouth daily 90 tablet 1     BETA BLOCKER NOT PRESCRIBED, INTENTIONAL, Beta Blocker not prescribed intentionally due to Bradycardia < 50 bpm without beta  blocker therapy  0     BUDESONIDE, INHALATION, 90 MCG/ACT AEPB Inhale 2 puffs into the lungs 2 times daily 1 each 3     calcium citrate-vitamin D (CITRACAL) 315-200 MG-UNIT TABS Take 1 tablet by mouth daily.       cefTRIAXone (ROCEPHIN) 2 GM vial Inject 2 g into the vein every 24 hours 10 each      CHOLECALCIFEROL PO Take 1 tablet by mouth daily Dose unknown       clopidogrel (PLAVIX) 75 MG tablet Take 1 tablet (75 mg) by mouth daily 90 tablet 1     docusate sodium (COLACE) 100 MG capsule Take 1 capsule (100 mg) by mouth 2 times daily 60 capsule      entecavir (BARACLUDE) 0.5 MG tablet Take 1 tablet (0.5 mg) by mouth daily 90 tablet 3     FLUoxetine (PROZAC) 40 MG capsule Take 1 capsule (40 mg) by mouth every morning 90 capsule 1     fluticasone (FLONASE) 50 MCG/ACT spray Spray 1-2 sprays into both nostrils daily 3 Bottle 11     fluticasone-salmeterol (ADVAIR) 250-50 MCG/DOSE diskus inhaler Inhale 1 puff into the lungs every 12 hours 60 Inhaler 1     heparin lock flush 10 UNIT/ML SOLN injection 2-5 mLs by Intracatheter route once as needed for line flush (for locking each dormant lumen with line placement)       isosorbide mononitrate (IMDUR) 30 MG 24 hr tablet Take 0.5 tablets (15 mg) by mouth daily 45 tablet 1     latanoprost (XALATAN) 0.005 % ophthalmic solution Place 1 drop into both eyes At Bedtime 3 Bottle 3     Multiple Vitamins-Iron (ONE DAILY MULTIVITAMIN/IRON) TABS Take 1 tablet by mouth daily       mycophenolate (GENERIC EQUIVALENT) 250 MG capsule Take 3 capsules (750 mg) by mouth 2 times daily 180 capsule 11     Omega-3 Fatty Acids (OMEGA 3 PO) Take 1 capsule by mouth daily Dose unknown       omeprazole (PRILOSEC OTC) 20 MG tablet Take  by mouth daily. 30 tablet      oxyCODONE IR (ROXICODONE) 5 MG tablet Take 1-2 tablets (5-10 mg) by mouth every 6 hours as needed for moderate to severe pain 19 tablet 0     polyethylene glycol (MIRALAX/GLYCOLAX) Packet Take 17 g by mouth daily 7 packet      predniSONE  (DELTASONE) 5 MG tablet Take 1 tablet (5 mg) by mouth daily 90 tablet 3       Allergies   Allergen Reactions     Penicillins Shortness Of Breath and Hives     Keflex [Cephalexin Hcl] Other (See Comments)     Pt could not recall reaction     Tetracycline Other (See Comments)     Patient could not recall reaction     Sulfa Drugs Rash       Review of Systems:  -As per HPI  -Constitutional: Otherwise feeling well today, in usual state of health.  -HEENT: Patient denies nonhealing oral sores.  -Skin: As above in HPI. No additional skin concerns.    Physical exam:  Vitals: There were no vitals taken for this visit.  GEN: This is a well developed, well-nourished male in no acute distress, in a pleasant mood.    SKIN: Full skin, which includes the head/face, both arms, chest, back, abdomen,both legs, genitalia and/or groin buttocks, digits and/or nails, was examined.  -There are 2 3mm verrucous papules on the L and R forehead papillomatous architecture      -There is sebaceous hyperplasia of the forehead  -8mm verrucous plaque on the left knee  -Within the superior end of the TKA scar on L leg, there is a 1.2 cm freely mobile cystic subcutaneous nodule most consistent with and epidermal inclusion cyst   -There are numerous waxy stuck on tan to brown to black papules on the trunk and extremities.  -R 3rd finger nail has dystrophic nail plate with horizontal band, and the nail bed has hyperkeratosis and scarring   -No other lesions of concern on areas examined.       Impression/Plan:  1. Filiform Warts    Cryotherapy procedure note: After verbal consent and discussion of risks and benefits including but no limited to dyspigmentation/scar, blister, and pain, 3 was(were) treated with 1-2mm freeze border for 2 cycles with liquid nitrogen. Post cryotherapy instructions were provided.    2. Neoplasm of uncertain behavior on the left neck. The differential diagnosis includes AK vs SCC.     Shave biopsy:  After discussion of  benefits and risks including but not limited to bleeding/bruising, pain/swelling, infection, scar, incomplete removal, nerve damage/numbness, recurrence, and non-diagnostic biopsy, written consent, verbal consent and photographs were obtained. Time-out was performed. The area was cleaned with isopropyl alcohol. 0.5ml of 1% lidocaine with 1:100,000 epinephrine was injected to obtain adequate anesthesia. A shave biopsy was performed. Hemostasis was achieved with aluminium chloride. Vaseline and a sterile dressing were applied. The patient tolerated the procedure and no complications were noted. The patient was provided with verbal and written post care instructions.    3. Neoplasm of uncertain behavior on the left lateral forearm. The differential diagnosis includes VK vs AK vs SCC.     Shave biopsy:  After discussion of benefits and risks including but not limited to bleeding/bruising, pain/swelling, infection, scar, incomplete removal, nerve damage/numbness, recurrence, and non-diagnostic biopsy, written consent, verbal consent and photographs were obtained. Time-out was performed. The area was cleaned with isopropyl alcohol. 0.5ml of 1% lidocaine with 1:100,000 epinephrine was injected to obtain adequate anesthesia. A shave biopsy was performed. Hemostasis was achieved with aluminium chloride. Vaseline and a sterile dressing were applied. The patient tolerated the procedure and no complications were noted. The patient was provided with verbal and written post care instructions.    4. Seborrheic keratosis, non irritated    No further intervention required. Patient to report changes.     5.   Beau's line right third fingernail- suspected due to trauma or inflammation of the proximal nail fold     Recommended Urea 40% cream    Follow-up in 5 months, earlier for new or changing lesions.     Staff Involved:  Scribe/Staff    Scribe Disclosure  I, Dominic Najjar, am serving as a scribe to document services personally  performed by Dr. Foster De Guzman MD, based on data collection and the provider's statements to me.     Staff attestation:  The documentation recorded by the scribe accurately reflects the services I personally performed and the decisions I personally made. I have made edits where needed.      Foster De Guzman MD

## 2018-12-05 ENCOUNTER — INFUSION THERAPY VISIT (OUTPATIENT)
Dept: INFUSION THERAPY | Facility: CLINIC | Age: 58
End: 2018-12-05
Attending: INTERNAL MEDICINE
Payer: MEDICARE

## 2018-12-05 DIAGNOSIS — M46.26 INFECTION OF LUMBAR SPINE (H): Primary | ICD-10-CM

## 2018-12-05 NOTE — PROGRESS NOTES
PICC removal nursing note:    Jerad Ross presents today to [facility] for a PICC removal.  During today's [facility] appointment orders from Dr. Perry were completed.    Progress note:  ID verified by name and .  Assessment completed.  verbal education given to patient/representative regarding PICC removal.   PICC site cleansed with chloroprep.   PICC removed and pressure held on site for 10 minutes.  Dressing applied over site.  Patient tolerated procedure well  Verified total catheter length: 41.5 cm          Discharge Plan:    Discharge instructions were reviewed with patient Yes  Patient/representative verbalized understanding of discharge instructions and all questions answered Yes.    Discharged from [facility] at 1040 with self to home.    Sarahy Isbell RN

## 2018-12-05 NOTE — MR AVS SNAPSHOT
After Visit Summary   12/5/2018    Jerad Ross    MRN: [id number]           Patient Information     Date Of Birth          1960        Visit Information        Provider Department      12/5/2018 10:00 AM UC 44 ATC; UC SPEC INFUSION [facility] Specialty and Procedure        Today's Diagnoses     Infection of lumbar spine (H)    -  1       Follow-ups after your visit        Your next 10 appointments already scheduled     Dec 11, 2018  2:05 PM CST   (Arrive by 1:35 PM)   Return Kidney Transplant with  Kidney/Pancreas Recipient 1   [facility] Nephrology ([facility])    [address]  [address]  [facility] [id number] [phone]            Dec 13, 2018 10:30 AM CST   (Arrive by 10:15 AM)   Return Visit with Sisi Lockwood MD   [facility] ([facility] [place])    [address]  [address]  [place] [facility] [id number] [phone]            Dec 13, 2018 12:30 PM CST   Adult Med Follow UP with RONALDO Dempsey CNP   Psychiatry Clinic ([facility])    [place] [id number]  [address] [address] [phone]            Feb 04, 2019  9:45 AM CST   RETURN GLAUCOMA with Viki Rizzo MD   Eye Clinic ([facility])    [address]  [facility] [address] [phone]              Who to contact     If you have questions or need follow up information about today's clinic visit or your schedule please contact [facility] SPECIALTY AND PROCEDURE directly at [phone].  Normal or non-critical lab and imaging results will be communicated to you by MyChart, letter or phone within 4 business days after the clinic has received the results. If you do not hear from us within 7 days, please contact the clinic through MyChart or phone. If you have a  critical or abnormal lab result, we will notify you by phone as soon as possible.  Submit refill requests through [employer] or call your pharmacy and they will forward the refill request to us. Please allow 3 business days for your refill to be completed.          Additional Information About Your Visit        NLP [employer] Information     [employer] gives you secure access to your electronic health record. If you see a primary care provider, you can also send messages to your care team and make appointments. If you have questions, please call your primary care clinic.  If you do not have a primary care provider, please call [phone] and they will assist you.        Care EveryWhere ID     This is your Care EveryWhere ID. This could be used by other organizations to access your [place] medical records  [id number]         Blood Pressure from Last 3 Encounters:   11/23/18 118/81   11/10/18 124/84   10/23/18 116/80    Weight from Last 3 Encounters:   11/23/18 79.4 kg (175 lb)   11/09/18 82.5 kg (181 lb 12.8 oz)   10/23/18 79 kg (174 lb 3.2 oz)              Today, you had the following     No orders found for display       Primary Care Provider Office Phone # Fax #    Aston Perry [phone]        [facility] [address]        Equal Access to Services     CARLOS MENDOZA : Hadii yuli ferrara hadasho Soaristeo, waaxda luqadaha, qaybta kaalmada adeegyada, nish gates . So [facility] [phone].    ATENCIÓN: Si habla español, tiene a sanchez disposición servicios gratuitos de asistencia lingüística. Llame al [phone].    We comply with applicable federal civil rights laws and [place] laws. We do not discriminate on the basis of race, color, national origin, age, disability, sex, sexual orientation, or gender identity.            Thank you!     Thank you for choosing [facility] SPECIALTY AND PROCEDURE  for your care. Our goal is always to provide  you with excellent care. Hearing back from our patients is one way we can continue to improve our services. Please take a few minutes to complete the written survey that you may receive in the mail after your visit with us. Thank you!             Your Updated Medication List - Protect others around you: Learn how to safely use, store and throw away your medicines at www.disposemymeds.org.          This list is accurate as of 12/5/18 10:57 AM.  Always use your most recent med list.                   Brand Name Dispense Instructions for use Diagnosis    albuterol 108 (90 Base) MCG/ACT inhaler    PROAIR HFA    1 Inhaler    Inhale 2 puffs into the lungs every 6 hours as needed for shortness of breath / dyspnea or wheezing    Cough, Wheezing       amLODIPine 5 MG tablet    NORVASC    90 tablet    Take 1 tablet (5 mg) by mouth daily    Acute chest pain       aspirin 81 MG tablet    ASA     Take 81 mg by mouth daily        atorvastatin 20 MG tablet    LIPITOR    90 tablet    Take 1 tablet (20 mg) by mouth daily    NSTEMI (non-ST elevated myocardial infarction) (H)       BETA BLOCKER NOT PRESCRIBED    INTENTIONAL     Beta Blocker not prescribed intentionally due to Bradycardia < 50 bpm without beta blocker therapy    NSTEMI (non-ST elevated myocardial infarction) (H)       budesonide 90 MCG/ACT inhaler    PULMICORT FLEXHALER    1 each    Inhale 2 puffs into the lungs 2 times daily    Asthma       calcium citrate-vitamin D 315-200 MG-UNIT Tabs per tablet    CITRACAL     Take 1 tablet by mouth daily.        cefTRIAXone 2 GM vial    ROCEPHIN    10 each    Inject 2 g into the vein every 24 hours    Infection of lumbar spine (H), Septic bursitis       CHOLECALCIFEROL PO      Take 1 tablet by mouth daily Dose unknown        clopidogrel 75 MG tablet    PLAVIX    90 tablet    Take 1 tablet (75 mg) by mouth daily    NSTEMI (non-ST elevated myocardial infarction) (H), Coronary artery disease involving native coronary artery of native  heart without angina pectoris       docusate sodium 100 MG capsule    COLACE    60 capsule    Take 1 capsule (100 mg) by mouth 2 times daily    Infection of lumbar spine (H), Acute midline low back pain without sciatica       entecavir 0.5 MG tablet    BARACLUDE    90 tablet    Take 1 tablet (0.5 mg) by mouth daily    Chronic viral hepatitis B without delta agent and without coma (H), Chronic hepatitis B (H)       FLUoxetine 40 MG capsule    PROzac    90 capsule    Take 1 capsule (40 mg) by mouth every morning    Major depressive disorder, recurrent episode, moderate (H)       fluticasone 50 MCG/ACT nasal spray    FLONASE    3 Bottle    Spray 1-2 sprays into both nostrils daily    Bronchitis with bronchospasm       fluticasone-salmeterol 250-50 MCG/DOSE inhaler    ADVAIR    60 Inhaler    Inhale 1 puff into the lungs every 12 hours    Cough       heparin lock flush 10 UNIT/ML Soln injection      2-5 mLs by Intracatheter route once as needed for line flush (for locking each dormant lumen with line placement)    Septic bursitis, Infection of lumbar spine (H)       isosorbide mononitrate 30 MG 24 hr tablet    IMDUR    45 tablet    Take 0.5 tablets (15 mg) by mouth daily    Acute chest pain       latanoprost 0.005 % ophthalmic solution    XALATAN    3 Bottle    Place 1 drop into both eyes At Bedtime    Borderline glaucoma with ocular hypertension, bilateral       mycophenolate 250 MG capsule    GENERIC EQUIVALENT    180 capsule    Take 3 capsules (750 mg) by mouth 2 times daily    Kidney transplanted       OMEGA 3 PO      Take 1 capsule by mouth daily Dose unknown        omeprazole 20 MG EC tablet    priLOSEC OTC    30 tablet    Take  by mouth daily.    Chronic hepatitis B (H), HTN (hypertension), Depression, Unspecified essential hypertension       ONE DAILY MULTIVITAMIN/IRON Tabs      Take 1 tablet by mouth daily        oxyCODONE 5 MG tablet    ROXICODONE    19 tablet    Take 1-2 tablets (5-10 mg) by mouth every 6  hours as needed for moderate to severe pain    Infection of lumbar spine (H)       polyethylene glycol packet    MIRALAX/GLYCOLAX    7 packet    Take 17 g by mouth daily    Infection of lumbar spine (H), Septic bursitis       predniSONE 5 MG tablet    DELTASONE    90 tablet    Take 1 tablet (5 mg) by mouth daily    Kidney transplanted       TYLENOL 325 MG tablet   Generic drug:  acetaminophen      Take 1-2 tablets by mouth every 6 hours as needed.

## 2018-12-06 LAB — COPATH REPORT: NORMAL

## 2018-12-11 ENCOUNTER — OFFICE VISIT (OUTPATIENT)
Dept: NEPHROLOGY | Facility: CLINIC | Age: 58
End: 2018-12-11
Attending: INTERNAL MEDICINE
Payer: MEDICARE

## 2018-12-11 VITALS
TEMPERATURE: 98.2 F | DIASTOLIC BLOOD PRESSURE: 80 MMHG | BODY MASS INDEX: 27.1 KG/M2 | OXYGEN SATURATION: 100 % | HEART RATE: 78 BPM | WEIGHT: 178.2 LBS | SYSTOLIC BLOOD PRESSURE: 120 MMHG

## 2018-12-11 DIAGNOSIS — E55.9 VITAMIN D DEFICIENCY: Primary | ICD-10-CM

## 2018-12-11 DIAGNOSIS — Z94.0 KIDNEY REPLACED BY TRANSPLANT: Primary | ICD-10-CM

## 2018-12-11 DIAGNOSIS — I15.1 HTN, KIDNEY TRANSPLANT RELATED: ICD-10-CM

## 2018-12-11 DIAGNOSIS — D84.9 IMMUNOSUPPRESSION (H): ICD-10-CM

## 2018-12-11 DIAGNOSIS — Z48.298 AFTERCARE FOLLOWING ORGAN TRANSPLANT: ICD-10-CM

## 2018-12-11 DIAGNOSIS — Z94.0 HTN, KIDNEY TRANSPLANT RELATED: ICD-10-CM

## 2018-12-11 PROCEDURE — G0463 HOSPITAL OUTPT CLINIC VISIT: HCPCS | Mod: ZF

## 2018-12-11 ASSESSMENT — PAIN SCALES - GENERAL: PAINLEVEL: NO PAIN (0)

## 2018-12-11 NOTE — PROGRESS NOTES
[facility]  Nephrology Clinic  Kidney/Pancreas Recipient  2018     Name: Jerad Ross  MRN: [id number]   Age: 58 year old  : 1960  Referring provider: Aston Perry     CHRONIC TRANSPLANT NEPHROLOGY VISIT    Assessment and Plan:   # DDKT: Stable   - Baseline Cr ~ 0.8-1.1;    - Proteinuria: Normal   - Date of DSA last checked:   Latest DSA: Not checked recently   - BK Viremia: Not checked recently   - Kidney Tx Biopsy: No    # Immunosuppression: Mycophenolate mofetil (goal  1-3.5) and Prednisone (dose  5 mg daily)   - Changes: No    # Hypertension: Controlled; Goal BP: < 130/80   - Changes: No    # Anemia in chronic renal disease: Hgb: Stable   - Iron studies: Not checked recently    # Mineral Bone Disorder:    - Vitamin D; level is: Low when last checked and will continue on cholecalciferol.  Will recheck vitamin D level with next labs.    # Electrolytes:   - Potassium; level: Low and recommend increasing dietary potassium.    # CAD: Asymptomatic.    # Pulmonary Restriction on PFTs: Patient is felt to have mild asthma.  Doing well.    # Chronic Hepatitis B: Stable on entecavir.    # Skin Cancer:   - New lesions: none   - Discussed sun protection and recommend regular follow up with Dermatology.    # Medical Compliance: Yes    Return visit: Return in about 1 year (around 2019).    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  Significant transplant-related complications: None    Transplant Office Phone Number: [phone]    Assessment and plan was discussed with the patient and he voiced his understanding and agreement.      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 Patient was last seen by me on 06/12/2018, please refer to that note for further details. Today, the patient reports feeling well and notes intermittent back pain that is not out of the usual. For exercise the patient walks on occasion. The patient denies any new skin lesions, and recently followed up with a dermatologist. The patient denies any lesions in their mouth, or lumps on their body. The patient also denies any pain over their kidneys.     Recent Hospitalizations:  [] No [x] Yes Patient hospitalized in November for an infected L 4-5 interspinous bursal cyst and Paraspinous cellulitis.   New Medical Issues: [x] No [] Yes    Decreased energy: [x] No [] Yes    Chest pain or SOB with exertion:  [x] No [] Yes    Appetite change or weight change: [x] No [] Yes    Nausea, vomiting or diarrhea:  [x] No [] Yes    Fever, sweats or chills: [x] No [] Yes    Leg swelling: [x] No [] Yes      Other medical issues:  No    Home BP: 120/80     Review of Systems:   A comprehensive review of systems was obtained and negative, except as noted in the HPI or past medical history.     Active Medications:   acetaminophen (TYLENOL) 325 MG tablet, Take 1-2 tablets by mouth every 6 hours as needed., Disp: , Rfl:   amLODIPine (NORVASC) 5 MG tablet, Take 1 tablet (5 mg) by mouth daily, Disp: 90 tablet, Rfl: 1  aspirin 81 MG tablet, Take 81 mg by mouth daily , Disp: , Rfl:   atorvastatin (LIPITOR) 20 MG tablet, Take 1 tablet (20 mg) by mouth daily, Disp: 90 tablet, Rfl: 1  BETA BLOCKER NOT PRESCRIBED, INTENTIONAL,, Beta Blocker not prescribed intentionally due to Bradycardia < 50 bpm without beta blocker therapy, Disp: , Rfl: 0  BUDESONIDE, INHALATION, 90 MCG/ACT AEPB, Inhale 2 puffs into the lungs 2 times daily, Disp: 1 each, Rfl: 3  calcium citrate-vitamin D (CITRACAL) 315-200 MG-UNIT  TABS, Take 1 tablet by mouth daily., Disp: , Rfl:   CHOLECALCIFEROL PO, Take 1 tablet by mouth daily Dose unknown, Disp: , Rfl:   clopidogrel (PLAVIX) 75 MG tablet, Take 1 tablet (75 mg) by mouth daily, Disp: 90 tablet, Rfl: 1  entecavir (BARACLUDE) 0.5 MG tablet, Take 1 tablet (0.5 mg) by mouth daily, Disp: 90 tablet, Rfl: 3  FLUoxetine (PROZAC) 40 MG capsule, Take 1 capsule (40 mg) by mouth every morning, Disp: 90 capsule, Rfl: 1  fluticasone (FLONASE) 50 MCG/ACT spray, Spray 1-2 sprays into both nostrils daily, Disp: 3 Bottle, Rfl: 11  isosorbide mononitrate (IMDUR) 30 MG 24 hr tablet, Take 0.5 tablets (15 mg) by mouth daily, Disp: 45 tablet, Rfl: 1  Multiple Vitamins-Iron (ONE DAILY MULTIVITAMIN/IRON) TABS, Take 1 tablet by mouth daily, Disp: , Rfl:   mycophenolate (GENERIC EQUIVALENT) 250 MG capsule, Take 3 capsules (750 mg) by mouth 2 times daily, Disp: 180 capsule, Rfl: 11  Omega-3 Fatty Acids (OMEGA 3 PO), Take 1 capsule by mouth daily Dose unknown, Disp: , Rfl:   omeprazole (PRILOSEC OTC) 20 MG tablet, Take  by mouth daily., Disp: 30 tablet, Rfl:   predniSONE (DELTASONE) 5 MG tablet, Take 1 tablet (5 mg) by mouth daily, Disp: 90 tablet, Rfl: 3  albuterol (PROAIR HFA) 108 (90 Base) MCG/ACT Inhaler, Inhale 2 puffs into the lungs every 6 hours as needed for shortness of breath / dyspnea or wheezing (Patient not taking: Reported on 12/11/2018), Disp: 1 Inhaler, Rfl: 2  cefTRIAXone (ROCEPHIN) 2 GM vial, Inject 2 g into the vein every 24 hours (Patient not taking: Reported on 12/11/2018), Disp: 10 each, Rfl:   docusate sodium (COLACE) 100 MG capsule, Take 1 capsule (100 mg) by mouth 2 times daily (Patient not taking: Reported on 12/11/2018), Disp: 60 capsule, Rfl:   fluticasone-salmeterol (ADVAIR) 250-50 MCG/DOSE diskus inhaler, Inhale 1 puff into the lungs every 12 hours (Patient not taking: Reported on 12/11/2018), Disp: 60 Inhaler, Rfl: 1  heparin lock flush 10 UNIT/ML SOLN injection, 2-5 mLs by Intracatheter  route once as needed for line flush (for locking each dormant lumen with line placement) (Patient not taking: Reported on 12/11/2018), Disp: , Rfl:   latanoprost (XALATAN) 0.005 % ophthalmic solution, Place 1 drop into both eyes At Bedtime (Patient not taking: Reported on 12/11/2018), Disp: 3 Bottle, Rfl: 3  oxyCODONE IR (ROXICODONE) 5 MG tablet, Take 1-2 tablets (5-10 mg) by mouth every 6 hours as needed for moderate to severe pain (Patient not taking: Reported on 12/11/2018), Disp: 19 tablet, Rfl: 0  polyethylene glycol (MIRALAX/GLYCOLAX) Packet, Take 17 g by mouth daily (Patient not taking: Reported on 12/11/2018), Disp: 7 packet, Rfl:       Allergies:   Penicillins  Keflex [cephalexin hcl]  Tetracycline  Sulfa drugs    Active Medical Problems:  BCC (basal cell carcinoma), trunk  JULIANE (obstructive sleep apnea)  Hypertension secondary to other renal disorders  Coronary artery disease involving native coronary artery of native heart without angina pectoris  NSTEMI (non-ST elevated myocardial infarction)  Hemorrhoids  Basal cell carcinoma  Squamous cell carcinoma  Dyslipidemia  CRP elevated  Glaucoma  AION (acute ischemic optic neuropathy)  Paracentral scotoma  Inflamed seborrheic keratosis  Intertrigo  Chronic hepatitis B  Immunosuppressed status  Hypogonadism in male  GERD (gastroesophageal reflux disease)  Septic bursitis  Infection of lumbar spine     Social History:   Patient formerly smoked 1 pack/day for nine years and quit on 1/1/1988. Patient reports that they drink alcohol rarely, about once per month.    Physical Exam:   There were no vitals taken for this visit.   Wt Readings from Last 4 Encounters:   11/23/18 79.4 kg (175 lb)   11/09/18 82.5 kg (181 lb 12.8 oz)   10/23/18 79 kg (174 lb 3.2 oz)   08/15/18 76.7 kg (169 lb)       GENERAL APPEARANCE: alert and no distress  HENT: mouth without ulcers or lesions  LYMPHATICS: no cervical or supraclavicular nodes  RESP: lungs clear to auscultation - no rales,  rhonchi or wheezes  CV: regular rhythm, normal rate, no rub, no murmur  EDEMA: no LE edema bilaterally  ABDOMEN: soft, nondistended, nontender, bowel sounds normal  MS: extremities normal - no gross deformities noted, no evidence of inflammation in joints, no muscle tenderness  SKIN: no rash  TX KIDNEY: normal  DIALYSIS ACCESS:  None    Data:     Renal Latest Ref Rng & Units 11/23/2018 11/9/2018 11/8/2018   Na 133 - 144 mmol/L 137 141 144   K 3.4 - 5.3 mmol/L 3.3(L) 4.3 3.6   Cl 94 - 109 mmol/L 103 104 105   CO2 20 - 32 mmol/L 27 30 30   BUN 7 - 30 mg/dL 12 19 14   Cr 0.66 - 1.25 mg/dL 0.77 0.75 0.74   Glucose 70 - 99 mg/dL 101(H) 77 59(L)   Ca  8.5 - 10.1 mg/dL 8.8 8.9 8.8   Mg 1.6 - 2.3 mg/dL - - -     Bone Health Latest Ref Rng & Units 11/5/2018 11/7/2017 10/3/2014   Phos 2.5 - 4.5 mg/dL 2.9 - 1.9(L)   PTHi 12 - 72 pg/mL - - -   Vit D Def 20 - 75 ug/L - 28 -     Heme Latest Ref Rng & Units 11/23/2018 11/9/2018 11/8/2018   WBC 4.0 - 11.0 10e9/L 8.2 7.2 6.6   Hgb 13.3 - 17.7 g/dL 12.6(L) 12.4(L) 12.1(L)   Plt 150 - 450 10e9/L 391 405 342     Liver Latest Ref Rng & Units 11/23/2018 10/23/2018 3/13/2018   AP 40 - 150 U/L 106 87 77   TBili 0.2 - 1.3 mg/dL 0.4 0.8 0.5   DBili 0.0 - 0.2 mg/dL - 0.2 0.1   ALT 0 - 70 U/L 27 22 27   AST 0 - 45 U/L 25 20 24   Tot Protein 6.8 - 8.8 g/dL 7.0 7.4 6.8   Albumin 3.4 - 5.0 g/dL 3.1(L) 3.5 3.2(L)     Pancreas Latest Ref Rng & Units 2/12/2012   Lipase 20 - 250 U/L 94     Iron studies Latest Ref Rng & Units 1/8/2009   Ferritin 20 - 300 ng/mL 76     [facility] Txp Virology Latest Ref Rng & Units 9/12/2017 2/14/2012 1/5/2011   CVM DNA Quant - Plasma, EDTA anticoagulant Whole blood, EDTA anticoagulant -   CMV Quant <100 Copies/mL - <100  No CMV DNA detected. -   CMV QT Log <2.0 Log copies/mL - <2.0  The Cytomegalovirus DNA Quantitation assay is a real-time polymerase chain   reaction (PCR) utilizing analyte specific reagents manufactured by [employer]   [employer]. Analyte Specific Reagents  (ASRs) are used in many laboratory   tests necessary for standard medical care and generally do not require FDA   approval.   This test was developed and its performance characteristics determined by   [facility].  It has not been   cleared or approved by the US Food and Drug Administration. -   Hep B Core NEG - - Positive(A)   Hep B Surf - - - 0.0            Recent Labs   Lab Test 09/12/17  0923 11/06/18  0647   DOSMPA 9pm 09.11.2017 Not Provided   MPACID 3.82* 0.55*   MPAG 69.3 29.5*     Scribe Disclosure:   I, Emma Longo, am serving as a scribe to document services personally performed by Dr. Lovett at this visit, based upon the provider's statements to me. All documentation has been reviewed by the aforementioned provider prior to being entered into the official medical record.     Portions of this medical record were completed by a scribe. UPON MY REVIEW AND AUTHENTICATION BY ELECTRONIC SIGNATURE, this confirms (a) I performed the applicable clinical services, and (b) the record is accurate.

## 2018-12-11 NOTE — LETTER
2018      RE: Jerad Ross  [address]  [facility] [address]       [facility]  Nephrology Clinic  Kidney/Pancreas Recipient  2018     Name: Jerad Ross  MRN: [id number]   Age: 58 year old  : 1960  Referring provider: Aston Perry     CHRONIC TRANSPLANT NEPHROLOGY VISIT    Assessment and Plan:   # DDKT: Stable   - Baseline Cr ~ 0.8-1.1;    - Proteinuria: Normal   - Date of DSA last checked:   Latest DSA: Not checked recently   - BK Viremia: Not checked recently   - Kidney Tx Biopsy: No    # Immunosuppression: Mycophenolate mofetil (goal  1-3.5) and Prednisone (dose  5 mg daily)   - Changes: No    # Hypertension: Controlled; Goal BP: < 130/80   - Changes: No    # Anemia in chronic renal disease: Hgb: Stable   - Iron studies: Not checked recently    # Mineral Bone Disorder:    - Vitamin D; level is: Low when last checked and will continue on cholecalciferol.  Will recheck vitamin D level with next labs.    # Electrolytes:   - Potassium; level: Low and recommend increasing dietary potassium.    # CAD: Asymptomatic.    # Pulmonary Restriction on PFTs: Patient is felt to have mild asthma.  Doing well.    # Chronic Hepatitis B: Stable on entecavir.    # Skin Cancer:   - New lesions: none   - Discussed sun protection and recommend regular follow up with Dermatology.    # Medical Compliance: Yes    Return visit: Return in about 1 year (around 2019).    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  Significant transplant-related complications: None    Transplant Office Phone Number: [phone]    Assessment and plan was discussed with the patient and he voiced his  understanding and agreement.      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 Patient was last seen by me on 06/12/2018, please refer to that note for further details. Today, the patient reports feeling well and notes intermittent back pain that is not out of the usual. For exercise the patient walks on occasion. The patient denies any new skin lesions, and recently followed up with a dermatologist. The patient denies any lesions in their mouth, or lumps on their body. The patient also denies any pain over their kidneys.     Recent Hospitalizations:  [] No [x] Yes Patient hospitalized in November for an infected L 4-5 interspinous bursal cyst and Paraspinous cellulitis.   New Medical Issues: [x] No [] Yes    Decreased energy: [x] No [] Yes    Chest pain or SOB with exertion:  [x] No [] Yes    Appetite change or weight change: [x] No [] Yes    Nausea, vomiting or diarrhea:  [x] No [] Yes    Fever, sweats or chills: [x] No [] Yes    Leg swelling: [x] No [] Yes      Other medical issues:  No    Home BP: 120/80     Review of Systems:   A comprehensive review of systems was obtained and negative, except as noted in the HPI or past medical history.     Active Medications:   acetaminophen (TYLENOL) 325 MG tablet, Take 1-2 tablets by mouth every 6 hours as needed., Disp: , Rfl:   amLODIPine (NORVASC) 5 MG tablet, Take 1 tablet (5 mg) by mouth daily, Disp: 90 tablet, Rfl: 1  aspirin 81 MG tablet, Take 81 mg by mouth daily , Disp: , Rfl:   atorvastatin (LIPITOR) 20 MG tablet, Take 1 tablet (20 mg) by mouth daily, Disp: 90 tablet, Rfl: 1  BETA BLOCKER NOT PRESCRIBED, INTENTIONAL,, Beta Blocker not prescribed intentionally due to Bradycardia < 50 bpm without beta blocker therapy, Disp: , Rfl: 0  BUDESONIDE, INHALATION, 90 MCG/ACT AEPB, Inhale 2 puffs into the  lungs 2 times daily, Disp: 1 each, Rfl: 3  calcium citrate-vitamin D (CITRACAL) 315-200 MG-UNIT TABS, Take 1 tablet by mouth daily., Disp: , Rfl:   CHOLECALCIFEROL PO, Take 1 tablet by mouth daily Dose unknown, Disp: , Rfl:   clopidogrel (PLAVIX) 75 MG tablet, Take 1 tablet (75 mg) by mouth daily, Disp: 90 tablet, Rfl: 1  entecavir (BARACLUDE) 0.5 MG tablet, Take 1 tablet (0.5 mg) by mouth daily, Disp: 90 tablet, Rfl: 3  FLUoxetine (PROZAC) 40 MG capsule, Take 1 capsule (40 mg) by mouth every morning, Disp: 90 capsule, Rfl: 1  fluticasone (FLONASE) 50 MCG/ACT spray, Spray 1-2 sprays into both nostrils daily, Disp: 3 Bottle, Rfl: 11  isosorbide mononitrate (IMDUR) 30 MG 24 hr tablet, Take 0.5 tablets (15 mg) by mouth daily, Disp: 45 tablet, Rfl: 1  Multiple Vitamins-Iron (ONE DAILY MULTIVITAMIN/IRON) TABS, Take 1 tablet by mouth daily, Disp: , Rfl:   mycophenolate (GENERIC EQUIVALENT) 250 MG capsule, Take 3 capsules (750 mg) by mouth 2 times daily, Disp: 180 capsule, Rfl: 11  Omega-3 Fatty Acids (OMEGA 3 PO), Take 1 capsule by mouth daily Dose unknown, Disp: , Rfl:   omeprazole (PRILOSEC OTC) 20 MG tablet, Take  by mouth daily., Disp: 30 tablet, Rfl:   predniSONE (DELTASONE) 5 MG tablet, Take 1 tablet (5 mg) by mouth daily, Disp: 90 tablet, Rfl: 3  albuterol (PROAIR HFA) 108 (90 Base) MCG/ACT Inhaler, Inhale 2 puffs into the lungs every 6 hours as needed for shortness of breath / dyspnea or wheezing (Patient not taking: Reported on 12/11/2018), Disp: 1 Inhaler, Rfl: 2  cefTRIAXone (ROCEPHIN) 2 GM vial, Inject 2 g into the vein every 24 hours (Patient not taking: Reported on 12/11/2018), Disp: 10 each, Rfl:   docusate sodium (COLACE) 100 MG capsule, Take 1 capsule (100 mg) by mouth 2 times daily (Patient not taking: Reported on 12/11/2018), Disp: 60 capsule, Rfl:   fluticasone-salmeterol (ADVAIR) 250-50 MCG/DOSE diskus inhaler, Inhale 1 puff into the lungs every 12 hours (Patient not taking: Reported on 12/11/2018),  Disp: 60 Inhaler, Rfl: 1  heparin lock flush 10 UNIT/ML SOLN injection, 2-5 mLs by Intracatheter route once as needed for line flush (for locking each dormant lumen with line placement) (Patient not taking: Reported on 12/11/2018), Disp: , Rfl:   latanoprost (XALATAN) 0.005 % ophthalmic solution, Place 1 drop into both eyes At Bedtime (Patient not taking: Reported on 12/11/2018), Disp: 3 Bottle, Rfl: 3  oxyCODONE IR (ROXICODONE) 5 MG tablet, Take 1-2 tablets (5-10 mg) by mouth every 6 hours as needed for moderate to severe pain (Patient not taking: Reported on 12/11/2018), Disp: 19 tablet, Rfl: 0  polyethylene glycol (MIRALAX/GLYCOLAX) Packet, Take 17 g by mouth daily (Patient not taking: Reported on 12/11/2018), Disp: 7 packet, Rfl:       Allergies:   Penicillins  Keflex [cephalexin hcl]  Tetracycline  Sulfa drugs    Active Medical Problems:  BCC (basal cell carcinoma), trunk  JULIANE (obstructive sleep apnea)  Hypertension secondary to other renal disorders  Coronary artery disease involving native coronary artery of native heart without angina pectoris  NSTEMI (non-ST elevated myocardial infarction)  Hemorrhoids  Basal cell carcinoma  Squamous cell carcinoma  Dyslipidemia  CRP elevated  Glaucoma  AION (acute ischemic optic neuropathy)  Paracentral scotoma  Inflamed seborrheic keratosis  Intertrigo  Chronic hepatitis B  Immunosuppressed status  Hypogonadism in male  GERD (gastroesophageal reflux disease)  Septic bursitis  Infection of lumbar spine     Social History:   Patient formerly smoked 1 pack/day for nine years and quit on 1/1/1988. Patient reports that they drink alcohol rarely, about once per month.    Physical Exam:   There were no vitals taken for this visit.   Wt Readings from Last 4 Encounters:   11/23/18 79.4 kg (175 lb)   11/09/18 82.5 kg (181 lb 12.8 oz)   10/23/18 79 kg (174 lb 3.2 oz)   08/15/18 76.7 kg (169 lb)       GENERAL APPEARANCE: alert and no distress  HENT: mouth without ulcers or  lesions  LYMPHATICS: no cervical or supraclavicular nodes  RESP: lungs clear to auscultation - no rales, rhonchi or wheezes  CV: regular rhythm, normal rate, no rub, no murmur  EDEMA: no LE edema bilaterally  ABDOMEN: soft, nondistended, nontender, bowel sounds normal  MS: extremities normal - no gross deformities noted, no evidence of inflammation in joints, no muscle tenderness  SKIN: no rash  TX KIDNEY: normal  DIALYSIS ACCESS:  None    Data:     Renal Latest Ref Rng & Units 11/23/2018 11/9/2018 11/8/2018   Na 133 - 144 mmol/L 137 141 144   K 3.4 - 5.3 mmol/L 3.3(L) 4.3 3.6   Cl 94 - 109 mmol/L 103 104 105   CO2 20 - 32 mmol/L 27 30 30   BUN 7 - 30 mg/dL 12 19 14   Cr 0.66 - 1.25 mg/dL 0.77 0.75 0.74   Glucose 70 - 99 mg/dL 101(H) 77 59(L)   Ca  8.5 - 10.1 mg/dL 8.8 8.9 8.8   Mg 1.6 - 2.3 mg/dL - - -     Bone Health Latest Ref Rng & Units 11/5/2018 11/7/2017 10/3/2014   Phos 2.5 - 4.5 mg/dL 2.9 - 1.9(L)   PTHi 12 - 72 pg/mL - - -   Vit D Def 20 - 75 ug/L - 28 -     Heme Latest Ref Rng & Units 11/23/2018 11/9/2018 11/8/2018   WBC 4.0 - 11.0 10e9/L 8.2 7.2 6.6   Hgb 13.3 - 17.7 g/dL 12.6(L) 12.4(L) 12.1(L)   Plt 150 - 450 10e9/L 391 405 342     Liver Latest Ref Rng & Units 11/23/2018 10/23/2018 3/13/2018   AP 40 - 150 U/L 106 87 77   TBili 0.2 - 1.3 mg/dL 0.4 0.8 0.5   DBili 0.0 - 0.2 mg/dL - 0.2 0.1   ALT 0 - 70 U/L 27 22 27   AST 0 - 45 U/L 25 20 24   Tot Protein 6.8 - 8.8 g/dL 7.0 7.4 6.8   Albumin 3.4 - 5.0 g/dL 3.1(L) 3.5 3.2(L)     Pancreas Latest Ref Rng & Units 2/12/2012   Lipase 20 - 250 U/L 94     Iron studies Latest Ref Rng & Units 1/8/2009   Ferritin 20 - 300 ng/mL 76     [facility] Txp Virology Latest Ref Rng & Units 9/12/2017 2/14/2012 1/5/2011   CVM DNA Quant - Plasma, EDTA anticoagulant Whole blood, EDTA anticoagulant -   CMV Quant <100 Copies/mL - <100  No CMV DNA detected. -   CMV QT Log <2.0 Log copies/mL - <2.0  The Cytomegalovirus DNA Quantitation assay is a real-time polymerase chain   reaction (PCR)  utilizing analyte specific reagents manufactured by [employer]   [employer]. Analyte Specific Reagents (ASRs) are used in many laboratory   tests necessary for standard medical care and generally do not require FDA   approval.   This test was developed and its performance characteristics determined by   [facility].  It has not been   cleared or approved by the US Food and Drug Administration. -   Hep B Core NEG - - Positive(A)   Hep B Surf - - - 0.0            Recent Labs   Lab Test 09/12/17  0923 11/06/18  0647   DOSMPA 9pm 09.11.2017 Not Provided   MPACID 3.82* 0.55*   MPAG 69.3 29.5*     Scribe Disclosure:   I, Emma Longo, am serving as a scribe to document services personally performed by Dr. Lovett at this visit, based upon the provider's statements to me. All documentation has been reviewed by the aforementioned provider prior to being entered into the official medical record.     Portions of this medical record were completed by a scribe. UPON MY REVIEW AND AUTHENTICATION BY ELECTRONIC SIGNATURE, this confirms (a) I performed the applicable clinical services, and (b) the record is accurate.       Nolan Lovett MD

## 2018-12-11 NOTE — NURSING NOTE
"Chief Complaint   Patient presents with     RECHECK     Follow Up Kidney TX     Vital signs:  Temp: 98.2  F (36.8  C) Temp src: Oral BP: 120/80 Pulse: 78     SpO2: 100 %       Weight: 80.8 kg (178 lb 3.2 oz)  Estimated body mass index is 27.1 kg/m  as calculated from the following:    Height as of 6/12/18: 1.727 m (5' 8\").    Weight as of this encounter: 80.8 kg (178 lb 3.2 oz).        Veronica Moser    "

## 2018-12-13 ENCOUNTER — OFFICE VISIT (OUTPATIENT)
Dept: PSYCHIATRY | Facility: CLINIC | Age: 58
End: 2018-12-13
Attending: NURSE PRACTITIONER
Payer: MEDICARE

## 2018-12-13 ENCOUNTER — OFFICE VISIT (OUTPATIENT)
Dept: PULMONOLOGY | Facility: CLINIC | Age: 58
End: 2018-12-13
Attending: INTERNAL MEDICINE
Payer: MEDICARE

## 2018-12-13 VITALS
BODY MASS INDEX: 27.47 KG/M2 | DIASTOLIC BLOOD PRESSURE: 76 MMHG | WEIGHT: 175 LBS | HEART RATE: 63 BPM | HEIGHT: 67 IN | SYSTOLIC BLOOD PRESSURE: 120 MMHG

## 2018-12-13 VITALS
OXYGEN SATURATION: 98 % | HEIGHT: 68 IN | WEIGHT: 178.13 LBS | RESPIRATION RATE: 17 BRPM | SYSTOLIC BLOOD PRESSURE: 129 MMHG | BODY MASS INDEX: 27 KG/M2 | HEART RATE: 67 BPM | DIASTOLIC BLOOD PRESSURE: 84 MMHG

## 2018-12-13 DIAGNOSIS — R05.3 CHRONIC COUGH: ICD-10-CM

## 2018-12-13 DIAGNOSIS — J45.991 COUGH VARIANT ASTHMA: ICD-10-CM

## 2018-12-13 DIAGNOSIS — J98.4 RESTRICTIVE LUNG DISEASE: Primary | ICD-10-CM

## 2018-12-13 DIAGNOSIS — F33.41 RECURRENT MAJOR DEPRESSIVE DISORDER, IN PARTIAL REMISSION (H): Primary | ICD-10-CM

## 2018-12-13 PROCEDURE — G0463 HOSPITAL OUTPT CLINIC VISIT: HCPCS | Mod: ZF

## 2018-12-13 RX ORDER — FLUOXETINE 40 MG/1
40 CAPSULE ORAL DAILY
Qty: 90 CAPSULE | Refills: 3 | Status: SHIPPED | OUTPATIENT
Start: 2018-12-13 | End: 2019-04-12

## 2018-12-13 ASSESSMENT — MIFFLIN-ST. JEOR
SCORE: 1578.45
SCORE: 1612.5

## 2018-12-13 ASSESSMENT — PAIN SCALES - GENERAL
PAINLEVEL: MILD PAIN (2)
PAINLEVEL: NO PAIN (0)
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
## 2018-12-13 NOTE — LETTER
12/13/2018       RE: Jerad Ross  [address]  [facility] [address]     Dear Colleague,    Thank you for referring your patient, Jerad Ross, to the [facility] at [facility]. Please see a copy of my visit note below.    Pulmonary Clinic Return Visit  History of Present Illness    Mr. Ross is a 58-year-old male with a past medical history of coronary artery disease, acid reflux, sleep apnea not on CPAP, and renal transplant maintained on CellCept and prednisone 5 mg daily who presents to pulmonary clinic today for follow up of restrictive lung disease and abnormal CT chest. To briefly review, PFTs from 4/2018 showed moderate restriction with FVC 63%.  These were stable compared to March 2018.  CT chest obtained in follow up to this from April 2018 showed scattered centrilobular ggo in the lingular and left lower lobe.  Repeat CT obtained in conjunction with our clinic visit showed resolution of previously seen opacities without further evidence of lung disease.  Given history suspicious for asthma, I recommended a trial of low dose ICS inhaler.    He returns to clinic today overall doing quite well.  He has felt very well since the late summer.  He continues to have a mild cough which is most pronounced with laughing.  The cough is occasionally productive of clear phlegm but is mostly dry.  He has been using the Pulmicort inhaler only very sparingly due to cost.  He otherwise denies any new or worsening shortness of breath, fevers, chills, or hemoptysis.  He did have a 1 week long hospitalization earlier this fall for an epidural abscess.  He has not had problems with recurrent pulmonary infections and denies any recent episodes of bronchitis.      He has a history of acid reflux and takes Prilosec on an as-needed basis for this.  He is a previous smoker of 1 pack per day ×10 years.  He quit 30 years ago.      Family  history is notable for a brother and a mother with asthma.    Review of Systems:  10 of 14 systems reviewed and are negative unless otherwise stated in HPI.    Past Medical History:   Diagnosis Date     AION (acute ischemic optic neuropathy)      Anemia in chronic renal disease      Avascular necrosis of bones of both hips (H)     s/p bilateral hip replacements     Basal cell carcinoma      Chronic hepatitis B (H)      Clostridium difficile colitis      Coronary artery disease involving native coronary artery of native heart without angina pectoris 6/17/2014    Coronary angiogram 6/17/14: Severe distal 3-vessel disease involving small vessels, not amenable to PCI or CABG.     CRP elevated      Depression      Diverticulosis      Dyslipidemia      FSGS (focal segmental glomerulosclerosis)      Gastric ulcer with hemorrhage 2/12/12     GERD (gastroesophageal reflux disease)      Glaucoma     OHTN     Hemorrhoids      Hypertension secondary to other renal disorders      Hypogonadism in male      Immunosuppressed status (H)      Kidney replaced by transplant     focal glomerulosclerosis      NSTEMI (non-ST elevated myocardial infarction) (H) 6/17/2014     JULIANE (obstructive sleep apnea)     Doesn't use CPAP     Paracentral scotoma     LE     Secondary renal hyperparathyroidism (H)      Squamous cell carcinoma        Past Surgical History:   Procedure Laterality Date     APPENDECTOMY       CATARACT IOL, RT/LT  4/19/2000    RE     CATARACT IOL, RT/LT  6/1/2000    LE     COLECTOMY SUBTOTAL  1983    10 cm, diverticulitis     COLONOSCOPY  2/13/2012    Procedure:COLONOSCOPY; Surgeon:SLOAN GALLARDO; Location: GI     ESOPHAGOSCOPY, GASTROSCOPY, DUODENOSCOPY (EGD), COMBINED  2/13/2012    Procedure:COMBINED ESOPHAGOSCOPY, GASTROSCOPY, DUODENOSCOPY (EGD); Surgeon:SLOAN GALLARDO; Location: GI     EXTRACAPSULAR CATARACT EXTRATION WITH INTRAOCULAR LENS IMPLANT  4-20-10, 6-1-10    Rt, Lt     HERNIA REPAIR   1995    Lt inguinal     HIP SURGERY      1981, bilat MITZI, revised 2001, 2005     KIDNEY SURGERY  1978 and 1993    transplant     KNEE SURGERY  1983, 1987    bilat TKA     MOHS MICROGRAPHIC PROCEDURE       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, auxiliary spleen     TONSILLECTOMY         Family History   Problem Relation Age of Onset     Cardiovascular Father         AI with valve repair     Hypertension Father      Kidney Disease Father      Cancer Paternal Grandmother         ovarian ca     Cerebrovascular Disease Paternal Grandfather      Cerebrovascular Disease Maternal Grandfather      Kidney Disease Paternal Aunt      Skin Cancer No family hx of      Glaucoma No family hx of      Macular Degeneration No family hx of      Amblyopia No family hx of      Melanoma No family hx of        Social History     Social History     Marital status:      Spouse name: N/A     Number of children: 0     Years of education: N/A     Occupational History      Disabled      [employer]     Social History Main Topics     Smoking status: Former Smoker     Packs/day: 1.00     Years: 9.00     Quit date: 1/1/1988     Smokeless tobacco: Former User     Alcohol use 0.0 oz/week     0 Standard drinks or equivalent per week      Comment: rarely 1 drink per month     Drug use: No     Sexual activity: Not on file     Other Topics Concern     Special Diet No     Exercise Yes     walks     Social History Narrative    . Wife is also a kidney transplant recipient.     Works part-time         Allergies   Allergen Reactions     Penicillins Shortness Of Breath and Hives     Keflex [Cephalexin Hcl] Other (See Comments)     Pt could not recall reaction     Tetracycline Other (See Comments)     Patient could not recall reaction     Sulfa Drugs Rash         Current Outpatient Medications:      acetaminophen (TYLENOL) 325 MG tablet, Take 1-2 tablets by mouth every 6 hours as needed., Disp: , Rfl:      amLODIPine (NORVASC) 5 MG  tablet, Take 1 tablet (5 mg) by mouth daily, Disp: 90 tablet, Rfl: 1     aspirin 81 MG tablet, Take 81 mg by mouth daily , Disp: , Rfl:      atorvastatin (LIPITOR) 20 MG tablet, Take 1 tablet (20 mg) by mouth daily, Disp: 90 tablet, Rfl: 1     BETA BLOCKER NOT PRESCRIBED, INTENTIONAL,, Beta Blocker not prescribed intentionally due to Bradycardia < 50 bpm without beta blocker therapy, Disp: , Rfl: 0     BUDESONIDE, INHALATION, 90 MCG/ACT AEPB, Inhale 2 puffs into the lungs 2 times daily, Disp: 1 each, Rfl: 3     calcium citrate-vitamin D (CITRACAL) 315-200 MG-UNIT TABS, Take 1 tablet by mouth daily., Disp: , Rfl:      CHOLECALCIFEROL PO, Take 1 tablet by mouth daily Dose unknown, Disp: , Rfl:      clopidogrel (PLAVIX) 75 MG tablet, Take 1 tablet (75 mg) by mouth daily, Disp: 90 tablet, Rfl: 1     docusate sodium (COLACE) 100 MG capsule, Take 1 capsule (100 mg) by mouth 2 times daily, Disp: 60 capsule, Rfl:      entecavir (BARACLUDE) 0.5 MG tablet, Take 1 tablet (0.5 mg) by mouth daily, Disp: 90 tablet, Rfl: 3     FLUoxetine (PROZAC) 40 MG capsule, Take 1 capsule (40 mg) by mouth every morning, Disp: 90 capsule, Rfl: 1     fluticasone (FLONASE) 50 MCG/ACT spray, Spray 1-2 sprays into both nostrils daily, Disp: 3 Bottle, Rfl: 11     fluticasone-salmeterol (ADVAIR) 250-50 MCG/DOSE diskus inhaler, Inhale 1 puff into the lungs every 12 hours, Disp: 60 Inhaler, Rfl: 1     heparin lock flush 10 UNIT/ML SOLN injection, 2-5 mLs by Intracatheter route once as needed for line flush (for locking each dormant lumen with line placement), Disp: , Rfl:      isosorbide mononitrate (IMDUR) 30 MG 24 hr tablet, Take 0.5 tablets (15 mg) by mouth daily, Disp: 45 tablet, Rfl: 1     latanoprost (XALATAN) 0.005 % ophthalmic solution, Place 1 drop into both eyes At Bedtime, Disp: 3 Bottle, Rfl: 3     Multiple Vitamins-Iron (ONE DAILY MULTIVITAMIN/IRON) TABS, Take 1 tablet by mouth daily, Disp: , Rfl:      mycophenolate (GENERIC EQUIVALENT)  "250 MG capsule, Take 3 capsules (750 mg) by mouth 2 times daily, Disp: 180 capsule, Rfl: 11     Omega-3 Fatty Acids (OMEGA 3 PO), Take 1 capsule by mouth daily Dose unknown, Disp: , Rfl:      omeprazole (PRILOSEC OTC) 20 MG tablet, Take  by mouth daily., Disp: 30 tablet, Rfl:      oxyCODONE IR (ROXICODONE) 5 MG tablet, Take 1-2 tablets (5-10 mg) by mouth every 6 hours as needed for moderate to severe pain, Disp: 19 tablet, Rfl: 0     polyethylene glycol (MIRALAX/GLYCOLAX) Packet, Take 17 g by mouth daily, Disp: 7 packet, Rfl:      predniSONE (DELTASONE) 5 MG tablet, Take 1 tablet (5 mg) by mouth daily, Disp: 90 tablet, Rfl: 3     albuterol (PROAIR HFA) 108 (90 Base) MCG/ACT Inhaler, Inhale 2 puffs into the lungs every 6 hours as needed for shortness of breath / dyspnea or wheezing (Patient not taking: Reported on 12/13/2018), Disp: 1 Inhaler, Rfl: 2     cefTRIAXone (ROCEPHIN) 2 GM vial, Inject 2 g into the vein every 24 hours (Patient not taking: Reported on 12/13/2018), Disp: 10 each, Rfl:       Physical Exam:  /84   Pulse 67   Resp 17   Ht 1.727 m (5' 8\")   Wt 80.8 kg (178 lb 2.1 oz)   SpO2 98%   BMI 27.08 kg/m     GENERAL: Well developed, well nourished, alert, and in no apparent distress.  HEENT: Normocephalic, atraumatic. PERRL, EOMI. Oral mucosa is moist. No perioral cyanosis.  NECK: supple, no masses, no thyromegaly.  RESP:  Normal respiratory effort.  CTAB.  No rales, wheezes, rhonchi.  No cyanosis or clubbing.  CV: Normal S1, S2, regular rhythm, normal rate. No murmur.  No LE edema.   ABDOMEN:  Soft, non-tender, non-distended.   SKIN: warm and dry. No rash.  NEURO: AAOx3.  Normal gait.  No focal neuro deficits.  PSYCH: mentation appears normal. and affect normal/bright    Results:  None.    Assessment and Plan:   Jerad Ross is a 58 year old male with a history of CAD and renal transplant on chronic immune suppression with MMF and Prednisone who presents to pulmonary clinic today for " follow up of restrictive lung disease.  The etiology of restriction on his PFTs is unclear to me.  There is no evidence of parenchymal or pleural scarring seen on CT and there are no obvious chest wall deformities to account for this. He is technically overweight by BMI 27 which could account for this somewhat, although the restriction seems out of proportion to this. Fortunately, he continues to deny any dyspnea and pulmonary function testing has been relatively stable. We will continue to follow this known restriction with serial PFTs.  We again discussed that his cough, in the setting of recurrent episodes of bronchitis may be due to asthma.  We discussed that the best course of treatment would be more consistent use of his ICS inhaler.  We additionally discussed other causes of chronic cough in adults including post nasal drip and acid reflux. If we were going to aggressively treat chronic cough, the recommendation would be concurrent treatment of all 3 entities with consistent ICS use, daily Flonase and continued use of acid suppressing medications.  As his cough is really only bothersome to him when laughing and he is not keen to take additional medications beyond what is absolutely necessary, he would like to defer treatment and continue to monitor the cough.  We will plan to see him back in clinic in about 10 months with repeat PFTs for continued monitoring of restrictive PFTs.  He knows how to contact us sooner if new questions or concerns arise.  He is up to date on a seasonal influenza vaccine, Prevnar (2014), and Pneumovax (2018).    Yaima Lockwood MD  Pulmonary and Critical Care Medicine    The above note was dictated using voice recognition software and may include typographical errors. Please contact the author for any clarifications.    I spent a total of 30 minutes face to face with Jerad Ross during today's office visit. Over 50% of this time was spent counseling the patient and/or  coordinating care regarding their pulmonary disease.

## 2018-12-13 NOTE — PATIENT INSTRUCTIONS
Thank you for coming to the PSYCHIATRY CLINIC.    Lab Testing:  If you had lab testing today and your results are reassuring or normal they will be mailed to you or sent through real trends within 7 days.   If the lab tests need quick action we will call you with the results.  The phone number we will call with results is # [phone] (home) . If this is not the best number please call our clinic and change the number.    Medication Refills:  If you need any refills please call your pharmacy and they will contact us. Our fax number for refills is [phone]. Please allow three business for refill processing.   If you need to  your refill at a new pharmacy, please contact the new pharmacy directly. The new pharmacy will help you get your medications transferred.     Scheduling:  If you have any concerns about today's visit or wish to schedule another appointment please call our office during normal business hours [phone] (8-5:00 M-F)    Contact Us:  Please call [phone] during business hours (8-5:00 M-F).  If after clinic hours, or on the weekend, please call  [phone].    Financial Assistance [phone]  [email or web address] Billing [phone]  [employer] Billing [phone]  Medical Records [phone]      MENTAL HEALTH CRISIS NUMBERS:  [facility]:   [facility] - [phone]   Crisis Residence [facility] - [phone]   Walk-In Counseling [facility] [phone]   COPE 24/7 [facility] Mobile Team for Adults - [[phone]]; Child - [[phone]]     [facility] - [phone]     [facility]:   [facility] - [phone]   Walk-in counseling [facility] - [phone]   Walk-in counseling [facility] - [phone]   Crisis Residence [facility] - [phone]   Urgent Care Adult Mental Health:   --Drop-in, 24/7 crisis line, and [place] Mobile Team [[phone]]    CRISIS TEXT  LINE: Text [phone] from anywhere, anytime, any crisis 24/7;    OR SEE www.crisistextline.org     Poison Control Center - [phone]    CHILD: [facility] needs assessment team - [phone]     [facility] - [phone]; or [facility] - [phone]    If you have a medical emergency please call 911or go to the nearest ER.                    _____________________________________________    Again thank you for choosing PSYCHIATRY CLINIC and please let us know how we can best partner with you to improve you and your family's health.  You may be receiving a survey in the mail regarding this appointment. We would love to have your feedback, both positive and negative, so please fill out the survey and return it using the provided envelope. The survey is done by an external company, so your answers are anonymous.

## 2018-12-13 NOTE — PROGRESS NOTES
Pulmonary Clinic Return Visit  History of Present Illness    Mr. Ross is a 58-year-old male with a past medical history of coronary artery disease, acid reflux, sleep apnea not on CPAP, and renal transplant maintained on CellCept and prednisone 5 mg daily who presents to pulmonary clinic today for follow up of restrictive lung disease and abnormal CT chest. To briefly review, PFTs from 4/2018 showed moderate restriction with FVC 63%.  These were stable compared to March 2018.  CT chest obtained in follow up to this from April 2018 showed scattered centrilobular ggo in the lingular and left lower lobe.  Repeat CT obtained in conjunction with our clinic visit showed resolution of previously seen opacities without further evidence of lung disease.  Given history suspicious for asthma, I recommended a trial of low dose ICS inhaler.    He returns to clinic today overall doing quite well.  He has felt very well since the late summer.  He continues to have a mild cough which is most pronounced with laughing.  The cough is occasionally productive of clear phlegm but is mostly dry.  He has been using the Pulmicort inhaler only very sparingly due to cost.  He otherwise denies any new or worsening shortness of breath, fevers, chills, or hemoptysis.  He did have a 1 week long hospitalization earlier this fall for an epidural abscess.  He has not had problems with recurrent pulmonary infections and denies any recent episodes of bronchitis.      He has a history of acid reflux and takes Prilosec on an as-needed basis for this.  He is a previous smoker of 1 pack per day ×10 years.  He quit 30 years ago.      Family history is notable for a brother and a mother with asthma.    Review of Systems:  10 of 14 systems reviewed and are negative unless otherwise stated in HPI.    Past Medical History:   Diagnosis Date     AION (acute ischemic optic neuropathy)      Anemia in chronic renal disease      Avascular necrosis of bones of both  hips (H)     s/p bilateral hip replacements     Basal cell carcinoma      Chronic hepatitis B (H)      Clostridium difficile colitis      Coronary artery disease involving native coronary artery of native heart without angina pectoris 6/17/2014    Coronary angiogram 6/17/14: Severe distal 3-vessel disease involving small vessels, not amenable to PCI or CABG.     CRP elevated      Depression      Diverticulosis      Dyslipidemia      FSGS (focal segmental glomerulosclerosis)      Gastric ulcer with hemorrhage 2/12/12     GERD (gastroesophageal reflux disease)      Glaucoma     OHTN     Hemorrhoids      Hypertension secondary to other renal disorders      Hypogonadism in male      Immunosuppressed status (H)      Kidney replaced by transplant     focal glomerulosclerosis      NSTEMI (non-ST elevated myocardial infarction) (H) 6/17/2014     JULIANE (obstructive sleep apnea)     Doesn't use CPAP     Paracentral scotoma     LE     Secondary renal hyperparathyroidism (H)      Squamous cell carcinoma        Past Surgical History:   Procedure Laterality Date     APPENDECTOMY       CATARACT IOL, RT/LT  4/19/2000    RE     CATARACT IOL, RT/LT  6/1/2000    LE     COLECTOMY SUBTOTAL  1983    10 cm, diverticulitis     COLONOSCOPY  2/13/2012    Procedure:COLONOSCOPY; Surgeon:SLOAN GALLARDO; Location: GI     ESOPHAGOSCOPY, GASTROSCOPY, DUODENOSCOPY (EGD), COMBINED  2/13/2012    Procedure:COMBINED ESOPHAGOSCOPY, GASTROSCOPY, DUODENOSCOPY (EGD); Surgeon:SLOAN GALLARDO; Location:[facility]     EXTRACAPSULAR CATARACT EXTRATION WITH INTRAOCULAR LENS IMPLANT  4-20-10, 6-1-10    Rt, Lt     HERNIA REPAIR  1995    Lt inguinal     HIP SURGERY      1981, bilat MITZI, revised 2001, 2005     KIDNEY SURGERY  1978 and 1993    transplant     KNEE SURGERY  1983, 1987    bilat TKA     MOHS MICROGRAPHIC PROCEDURE       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, auxiliary spleen     TONSILLECTOMY         Family History   Problem Relation Age  of Onset     Cardiovascular Father         AI with valve repair     Hypertension Father      Kidney Disease Father      Cancer Paternal Grandmother         ovarian ca     Cerebrovascular Disease Paternal Grandfather      Cerebrovascular Disease Maternal Grandfather      Kidney Disease Paternal Aunt      Skin Cancer No family hx of      Glaucoma No family hx of      Macular Degeneration No family hx of      Amblyopia No family hx of      Melanoma No family hx of        Social History     Social History     Marital status:      Spouse name: N/A     Number of children: 0     Years of education: N/A     Occupational History      Disabled      [employer]     Social History Main Topics     Smoking status: Former Smoker     Packs/day: 1.00     Years: 9.00     Quit date: 1/1/1988     Smokeless tobacco: Former User     Alcohol use 0.0 oz/week     0 Standard drinks or equivalent per week      Comment: rarely 1 drink per month     Drug use: No     Sexual activity: Not on file     Other Topics Concern     Special Diet No     Exercise Yes     walks     Social History Narrative    . Wife is also a kidney transplant recipient.     Works part-time         Allergies   Allergen Reactions     Penicillins Shortness Of Breath and Hives     Keflex [Cephalexin Hcl] Other (See Comments)     Pt could not recall reaction     Tetracycline Other (See Comments)     Patient could not recall reaction     Sulfa Drugs Rash         Current Outpatient Medications:      acetaminophen (TYLENOL) 325 MG tablet, Take 1-2 tablets by mouth every 6 hours as needed., Disp: , Rfl:      amLODIPine (NORVASC) 5 MG tablet, Take 1 tablet (5 mg) by mouth daily, Disp: 90 tablet, Rfl: 1     aspirin 81 MG tablet, Take 81 mg by mouth daily , Disp: , Rfl:      atorvastatin (LIPITOR) 20 MG tablet, Take 1 tablet (20 mg) by mouth daily, Disp: 90 tablet, Rfl: 1     BETA BLOCKER NOT PRESCRIBED, INTENTIONAL,, Beta Blocker not prescribed  intentionally due to Bradycardia < 50 bpm without beta blocker therapy, Disp: , Rfl: 0     BUDESONIDE, INHALATION, 90 MCG/ACT AEPB, Inhale 2 puffs into the lungs 2 times daily, Disp: 1 each, Rfl: 3     calcium citrate-vitamin D (CITRACAL) 315-200 MG-UNIT TABS, Take 1 tablet by mouth daily., Disp: , Rfl:      CHOLECALCIFEROL PO, Take 1 tablet by mouth daily Dose unknown, Disp: , Rfl:      clopidogrel (PLAVIX) 75 MG tablet, Take 1 tablet (75 mg) by mouth daily, Disp: 90 tablet, Rfl: 1     docusate sodium (COLACE) 100 MG capsule, Take 1 capsule (100 mg) by mouth 2 times daily, Disp: 60 capsule, Rfl:      entecavir (BARACLUDE) 0.5 MG tablet, Take 1 tablet (0.5 mg) by mouth daily, Disp: 90 tablet, Rfl: 3     FLUoxetine (PROZAC) 40 MG capsule, Take 1 capsule (40 mg) by mouth every morning, Disp: 90 capsule, Rfl: 1     fluticasone (FLONASE) 50 MCG/ACT spray, Spray 1-2 sprays into both nostrils daily, Disp: 3 Bottle, Rfl: 11     fluticasone-salmeterol (ADVAIR) 250-50 MCG/DOSE diskus inhaler, Inhale 1 puff into the lungs every 12 hours, Disp: 60 Inhaler, Rfl: 1     heparin lock flush 10 UNIT/ML SOLN injection, 2-5 mLs by Intracatheter route once as needed for line flush (for locking each dormant lumen with line placement), Disp: , Rfl:      isosorbide mononitrate (IMDUR) 30 MG 24 hr tablet, Take 0.5 tablets (15 mg) by mouth daily, Disp: 45 tablet, Rfl: 1     latanoprost (XALATAN) 0.005 % ophthalmic solution, Place 1 drop into both eyes At Bedtime, Disp: 3 Bottle, Rfl: 3     Multiple Vitamins-Iron (ONE DAILY MULTIVITAMIN/IRON) TABS, Take 1 tablet by mouth daily, Disp: , Rfl:      mycophenolate (GENERIC EQUIVALENT) 250 MG capsule, Take 3 capsules (750 mg) by mouth 2 times daily, Disp: 180 capsule, Rfl: 11     Omega-3 Fatty Acids (OMEGA 3 PO), Take 1 capsule by mouth daily Dose unknown, Disp: , Rfl:      omeprazole (PRILOSEC OTC) 20 MG tablet, Take  by mouth daily., Disp: 30 tablet, Rfl:      oxyCODONE IR (ROXICODONE) 5 MG  "tablet, Take 1-2 tablets (5-10 mg) by mouth every 6 hours as needed for moderate to severe pain, Disp: 19 tablet, Rfl: 0     polyethylene glycol (MIRALAX/GLYCOLAX) Packet, Take 17 g by mouth daily, Disp: 7 packet, Rfl:      predniSONE (DELTASONE) 5 MG tablet, Take 1 tablet (5 mg) by mouth daily, Disp: 90 tablet, Rfl: 3     albuterol (PROAIR HFA) 108 (90 Base) MCG/ACT Inhaler, Inhale 2 puffs into the lungs every 6 hours as needed for shortness of breath / dyspnea or wheezing (Patient not taking: Reported on 12/13/2018), Disp: 1 Inhaler, Rfl: 2     cefTRIAXone (ROCEPHIN) 2 GM vial, Inject 2 g into the vein every 24 hours (Patient not taking: Reported on 12/13/2018), Disp: 10 each, Rfl:       Physical Exam:  /84   Pulse 67   Resp 17   Ht 1.727 m (5' 8\")   Wt 80.8 kg (178 lb 2.1 oz)   SpO2 98%   BMI 27.08 kg/m    GENERAL: Well developed, well nourished, alert, and in no apparent distress.  HEENT: Normocephalic, atraumatic. PERRL, EOMI. Oral mucosa is moist. No perioral cyanosis.  NECK: supple, no masses, no thyromegaly.  RESP:  Normal respiratory effort.  CTAB.  No rales, wheezes, rhonchi.  No cyanosis or clubbing.  CV: Normal S1, S2, regular rhythm, normal rate. No murmur.  No LE edema.   ABDOMEN:  Soft, non-tender, non-distended.   SKIN: warm and dry. No rash.  NEURO: AAOx3.  Normal gait.  No focal neuro deficits.  PSYCH: mentation appears normal. and affect normal/bright    Results:  None.    Assessment and Plan:   Jerad Ross is a 58 year old male with a history of CAD and renal transplant on chronic immune suppression with MMF and Prednisone who presents to pulmonary clinic today for follow up of restrictive lung disease.  The etiology of restriction on his PFTs is unclear to me.  There is no evidence of parenchymal or pleural scarring seen on CT and there are no obvious chest wall deformities to account for this. He is technically overweight by BMI 27 which could account for this somewhat, " although the restriction seems out of proportion to this. Fortunately, he continues to deny any dyspnea and pulmonary function testing has been relatively stable. We will continue to follow this known restriction with serial PFTs.  We again discussed that his cough, in the setting of recurrent episodes of bronchitis may be due to asthma.  We discussed that the best course of treatment would be more consistent use of his ICS inhaler.  We additionally discussed other causes of chronic cough in adults including post nasal drip and acid reflux. If we were going to aggressively treat chronic cough, the recommendation would be concurrent treatment of all 3 entities with consistent ICS use, daily Flonase and continued use of acid suppressing medications.  As his cough is really only bothersome to him when laughing and he is not keen to take additional medications beyond what is absolutely necessary, he would like to defer treatment and continue to monitor the cough.  We will plan to see him back in clinic in about 10 months with repeat PFTs for continued monitoring of restrictive PFTs.  He knows how to contact us sooner if new questions or concerns arise.  He is up to date on a seasonal influenza vaccine, Prevnar (2014), and Pneumovax (2018).    Yaima Lockwood MD  Pulmonary and Critical Care Medicine    The above note was dictated using voice recognition software and may include typographical errors. Please contact the author for any clarifications.    I spent a total of 30 minutes face to face with Jerad Ross during today's office visit. Over 50% of this time was spent counseling the patient and/or coordinating care regarding their pulmonary disease.

## 2018-12-13 NOTE — NURSING NOTE
Chief Complaint   Patient presents with     RECHECK     Restrictive Lung disease, cough    Paulo Pandey CMA

## 2018-12-30 RX ORDER — LIDOCAINE HYDROCHLORIDE AND EPINEPHRINE 10; 10 MG/ML; UG/ML
3 INJECTION, SOLUTION INFILTRATION; PERINEURAL ONCE
Status: DISCONTINUED | OUTPATIENT
Start: 2018-12-30 | End: 2021-07-26

## 2019-01-10 DIAGNOSIS — I21.4 NSTEMI (NON-ST ELEVATED MYOCARDIAL INFARCTION) (H): ICD-10-CM

## 2019-01-10 DIAGNOSIS — R07.9 ACUTE CHEST PAIN: ICD-10-CM

## 2019-01-14 RX ORDER — ATORVASTATIN CALCIUM 20 MG/1
20 TABLET, FILM COATED ORAL DAILY
Qty: 90 TABLET | Refills: 1 | Status: SHIPPED | OUTPATIENT
Start: 2019-01-14 | End: 2019-05-08

## 2019-01-14 RX ORDER — AMLODIPINE BESYLATE 5 MG/1
5 TABLET ORAL DAILY
Qty: 90 TABLET | Refills: 1 | Status: SHIPPED | OUTPATIENT
Start: 2019-01-14 | End: 2019-05-08

## 2019-01-24 DIAGNOSIS — R07.9 ACUTE CHEST PAIN: ICD-10-CM

## 2019-01-24 RX ORDER — ISOSORBIDE MONONITRATE 30 MG/1
15 TABLET, EXTENDED RELEASE ORAL DAILY
Qty: 45 TABLET | Refills: 1 | Status: SHIPPED | OUTPATIENT
Start: 2019-01-24 | End: 2019-05-08

## 2019-02-14 ENCOUNTER — TELEPHONE (OUTPATIENT)
Dept: OPHTHALMOLOGY | Facility: CLINIC | Age: 59
End: 2019-02-14

## 2019-02-14 ENCOUNTER — MYC MEDICAL ADVICE (OUTPATIENT)
Dept: INTERNAL MEDICINE | Facility: CLINIC | Age: 59
End: 2019-02-14

## 2019-02-14 NOTE — TELEPHONE ENCOUNTER
Note to  to review any new AION studies starting, besides quark NAION study  Mike Ruano RN 12:24 PM 02/14/19      [facility] Call Center    Phone Message    May a detailed message be left on voicemail: yes    Reason for Call: Other: Pt wondering if the Clinical Trial for AION has started yet? Pt requesting to be a part of that - Please return Pt call - Thanks     Action Taken: Message routed to:  [facility]

## 2019-03-07 ENCOUNTER — TELEPHONE (OUTPATIENT)
Dept: TRANSPLANT | Facility: CLINIC | Age: 59
End: 2019-03-07

## 2019-03-07 DIAGNOSIS — Z48.298 AFTERCARE FOLLOWING ORGAN TRANSPLANT: Primary | ICD-10-CM

## 2019-03-07 DIAGNOSIS — Z94.0 KIDNEY REPLACED BY TRANSPLANT: ICD-10-CM

## 2019-03-07 DIAGNOSIS — Z79.899 ENCOUNTER FOR LONG-TERM CURRENT USE OF MEDICATION: ICD-10-CM

## 2019-03-07 NOTE — TELEPHONE ENCOUNTER
Pt left  3/7/19 at 12:10pm. Pt wants to make sure he has up to date lab orders. Pt does not take tacro, pt takes cyclosporine for drug analysis. Pt would like a call back.

## 2019-03-12 ENCOUNTER — TELEPHONE (OUTPATIENT)
Dept: PSYCHIATRY | Facility: CLINIC | Age: 59
End: 2019-03-12

## 2019-03-12 NOTE — TELEPHONE ENCOUNTER
FW: Patient question   Received: Yesterday   Message Contents   Genia Fraser APRN CNP Patel, Radhika, RN   Phone Number: [phone]             Rafia- can you call him and find out a little more?     Does he prefer seeing a male? Someone closer to his age? Location? Day or evening?     There is a male clinician with a special interest in spirituality at my [place] [facility]. Can find out if he works in another too.     Also, there is [facility] in [facility].. Just want to know more about what Jerad wants.     Thanks,   Genia    Previous Messages      ----- Message -----   From: Yovani Sebastian   Sent: 3/11/2019  10:00 AM   To: RONALDO Dempsey CNP   Subject: Patient question                                 Good morning!   Patient called this morning, and is looking into therapy. Patient stated he appreciates your opinion on these matters, and he wanted to know whom you would suggest for his overall needs.         Writer placed a call to patient at [phone]. Patient at this time is open to visiting therapist at any location preferable close to home. Patient also does not have a preference on male or a female therapist but did mention wanting someone experienced. Writer agreed to pass this to provider. Patient wanting to complete therapy for depression.

## 2019-03-12 NOTE — TELEPHONE ENCOUNTER
Clinic Care Coordination - Follow-up (therapy )     Genia Fraser, RONALDO CNP   You 12 minutes ago (2:19 PM)      Ask him to google [facility]. They have offices in [facility]. He might be particularly interested the profiles for Elvis Garcia and Aki Alejandre.     Genia Cruz      Writer placed a call to patient at [phone]. Relayed providers recommendations to reach to [facility] at [phone]. Writer also relayed to look into profiles of Elvis Garcia and Aki Alejandre. Patient agreed with the plan. Will call back for further questions.     No further action needed by this writer

## 2019-04-02 DIAGNOSIS — Z94.0 KIDNEY REPLACED BY TRANSPLANT: ICD-10-CM

## 2019-04-02 DIAGNOSIS — Z79.899 ENCOUNTER FOR LONG-TERM CURRENT USE OF MEDICATION: ICD-10-CM

## 2019-04-02 DIAGNOSIS — Z48.298 AFTERCARE FOLLOWING ORGAN TRANSPLANT: ICD-10-CM

## 2019-04-02 LAB
ANION GAP SERPL CALCULATED.3IONS-SCNC: 6 MMOL/L (ref 3–14)
BUN SERPL-MCNC: 12 MG/DL (ref 7–30)
CALCIUM SERPL-MCNC: 9.6 MG/DL (ref 8.5–10.1)
CHLORIDE SERPL-SCNC: 102 MMOL/L (ref 94–109)
CO2 SERPL-SCNC: 26 MMOL/L (ref 20–32)
CREAT SERPL-MCNC: 0.74 MG/DL (ref 0.66–1.25)
CREAT UR-MCNC: 128 MG/DL
ERYTHROCYTE [DISTWIDTH] IN BLOOD BY AUTOMATED COUNT: 16.9 % (ref 10–15)
GFR SERPL CREATININE-BSD FRML MDRD: >90 ML/MIN/{1.73_M2}
GLUCOSE SERPL-MCNC: 80 MG/DL (ref 70–99)
HCT VFR BLD AUTO: 44.2 % (ref 40–53)
HGB BLD-MCNC: 13.5 G/DL (ref 13.3–17.7)
MCH RBC QN AUTO: 27.5 PG (ref 26.5–33)
MCHC RBC AUTO-ENTMCNC: 30.5 G/DL (ref 31.5–36.5)
MCV RBC AUTO: 90 FL (ref 78–100)
PLATELET # BLD AUTO: 332 10E9/L (ref 150–450)
POTASSIUM SERPL-SCNC: 3.8 MMOL/L (ref 3.4–5.3)
PROT UR-MCNC: 0.3 G/L
PROT/CREAT 24H UR: 0.23 G/G CR (ref 0–0.2)
RBC # BLD AUTO: 4.91 10E12/L (ref 4.4–5.9)
SODIUM SERPL-SCNC: 134 MMOL/L (ref 133–144)
WBC # BLD AUTO: 8.2 10E9/L (ref 4–11)

## 2019-04-03 DIAGNOSIS — B18.1 CHRONIC VIRAL HEPATITIS B WITHOUT DELTA AGENT AND WITHOUT COMA (H): ICD-10-CM

## 2019-04-03 DIAGNOSIS — B18.1 CHRONIC HEPATITIS B (H): ICD-10-CM

## 2019-04-03 RX ORDER — ENTECAVIR 0.5 MG/1
0.5 TABLET, FILM COATED ORAL DAILY
Qty: 90 TABLET | Refills: 3 | Status: SHIPPED | OUTPATIENT
Start: 2019-04-03 | End: 2020-04-14

## 2019-04-05 DIAGNOSIS — Z94.0 KIDNEY TRANSPLANTED: ICD-10-CM

## 2019-04-05 RX ORDER — PREDNISONE 5 MG/1
5 TABLET ORAL DAILY
Qty: 90 TABLET | Refills: 3 | Status: SHIPPED | OUTPATIENT
Start: 2019-04-05 | End: 2020-03-16

## 2019-04-05 NOTE — TELEPHONE ENCOUNTER
Patient Call: Medication Refill  Route to LPN  Instruct the patient to first contact their pharmacy. If they have called their pharmacy and require further assistance, route to LPN.    Pharmacy Name:       [facility] [address] [place] [address] [address] AT [address] [phone] (Phone)  [phone] (Fax)         Name of Medication:   predniSONE (DELTASONE) 5 MG tablet    When will the patient be out of this medication?: Less than 3 days (Route high priority)    Pt left a  4/5/19 at 8:42a

## 2019-04-10 ENCOUNTER — TELEPHONE (OUTPATIENT)
Dept: INTERNAL MEDICINE | Facility: CLINIC | Age: 59
End: 2019-04-10

## 2019-04-10 NOTE — TELEPHONE ENCOUNTER
[facility] Call Center    Phone Message    May a detailed message be left on voicemail: yes, Pt is wanting to know if Doctor has received [employer] Form and is wanting to know if Doctor has filled it out, Pt would like a call back     Reason for Call: Form or Letter   Type or form/letter needing completion: [employer] Form  Provider: FAUSTINA NIELSEN Date form needed: ASAP  Once completed: Mail form to Name: Mail form back to Pt address, at Address: Pts Address      Action Taken: Message routed to:  [facility]

## 2019-04-12 ENCOUNTER — OFFICE VISIT (OUTPATIENT)
Dept: PSYCHIATRY | Facility: CLINIC | Age: 59
End: 2019-04-12
Attending: NURSE PRACTITIONER
Payer: MEDICARE

## 2019-04-12 VITALS
WEIGHT: 172 LBS | DIASTOLIC BLOOD PRESSURE: 89 MMHG | HEART RATE: 68 BPM | SYSTOLIC BLOOD PRESSURE: 130 MMHG | BODY MASS INDEX: 27.14 KG/M2

## 2019-04-12 DIAGNOSIS — F33.41 RECURRENT MAJOR DEPRESSIVE DISORDER, IN PARTIAL REMISSION (H): ICD-10-CM

## 2019-04-12 PROCEDURE — G0463 HOSPITAL OUTPT CLINIC VISIT: HCPCS | Mod: ZF

## 2019-04-12 RX ORDER — FLUOXETINE 40 MG/1
40 CAPSULE ORAL DAILY
Qty: 90 CAPSULE | Refills: 3 | Status: SHIPPED | OUTPATIENT
Start: 2019-04-12 | End: 2019-10-02

## 2019-04-12 RX ORDER — FLUOXETINE 10 MG/1
10 CAPSULE ORAL DAILY
Qty: 90 CAPSULE | Refills: 0 | Status: SHIPPED | OUTPATIENT
Start: 2019-04-12 | End: 2019-06-27

## 2019-04-12 ASSESSMENT — PATIENT HEALTH QUESTIONNAIRE - PHQ9: SUM OF ALL RESPONSES TO PHQ QUESTIONS 1-9: 11

## 2019-04-12 ASSESSMENT — PAIN SCALES - GENERAL: PAINLEVEL: NO PAIN (0)
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
## 2019-04-12 NOTE — NURSING NOTE
Chief Complaint   Patient presents with     Recheck Medication     Recurrent major depressive disorder, in partial remission    '

## 2019-04-12 NOTE — PATIENT INSTRUCTIONS
Thank you for coming to the PSYCHIATRY CLINIC.    Lab Testing:  If you had lab testing today and your results are reassuring or normal they will be mailed to you or sent through [employer] within 7 days.   If the lab tests need quick action we will call you with the results.  The phone number we will call with results is # [phone] (home) . If this is not the best number please call our clinic and change the number.    Medication Refills:  If you need any refills please call your pharmacy and they will contact us. Our fax number for refills is [phone]. Please allow three business for refill processing.   If you need to  your refill at a new pharmacy, please contact the new pharmacy directly. The new pharmacy will help you get your medications transferred.     Scheduling:  If you have any concerns about today's visit or wish to schedule another appointment please call our office during normal business hours [phone] (8-5:00 M-F)    Contact Us:  Please call [phone] during business hours (8-5:00 M-F).  If after clinic hours, or on the weekend, please call  [phone].    Financial Assistance [phone]  [employer] Billing [phone]  [employer] Billing [phone]  Medical Records [phone]      MENTAL HEALTH CRISIS NUMBERS:  [facility]:   [facility] - [phone]   Crisis Residence [facility] - [phone]   Walk-In Counseling [facility] [phone]   COPE 24/7 [facility] Mobile Team for Adults - [[phone]]; Child - [[phone]]        [facility]:   [facility] - [phone]   Walk-in counseling [facility] - [phone]   Walk-in counseling [facility] - [phone]   Crisis Residence [facility] - [phone]   Urgent Care Adult Mental Health:   --Drop-in, 24/7 crisis line, and [place] Mobile Team [[phone]]    CRISIS TEXT LINE: Text [phone] from anywhere,  anytime, any crisis 24/7;    OR SEE [email or web address]     Poison Control Center - [phone]    CHILD: [facility] needs assessment team - [phone]     [facility] - [phone]; or [facility] - [phone]    If you have a medical emergency please call 911or go to the nearest ER.                    _____________________________________________    Again thank you for choosing PSYCHIATRY CLINIC and please let us know how we can best partner with you to improve you and your family's health.  You may be receiving a survey in the mail regarding this appointment. We would love to have your feedback, both positive and negative, so please fill out the survey and return it using the provided envelope. The survey is done by an external company, so your answers are anonymous.

## 2019-04-15 ENCOUNTER — TELEPHONE (OUTPATIENT)
Dept: PSYCHIATRY | Facility: CLINIC | Age: 59
End: 2019-04-15

## 2019-04-15 NOTE — TELEPHONE ENCOUNTER
"Writer called pt to assess for safety concerns and discuss tolerability to increased Prozac.     Pt denies any current SI or safety concerns today or since his appt on Friday, 4/12. He surrendered his firearm to his sponsor, Ap on Friday after the appt and denies any other access to weapons. The pt reports his anxiety and depression as \"medium\" and voiced that this is still somewhat of a concern. He started the increased Prozac dose on Friday, 4/12 and denies any SEs. The pt reports he struggles with energy and motivation, but is sleeping an appropriate amount each night.     The pt has been looking into different treatment centers for sex addiction and would like to see what the provider recommends. He reports that many facilities will not accept his insurance. The pt has looked into facilities outside of [place]. He has also [facility] with [facility] Still, he's wondering if the provider has any other recommendations. Agreed to connect with her and possibly SW for suggestions. Pt agreed to this and requested a c/b. Okay to leave a detailed VM.  "

## 2019-04-15 NOTE — TELEPHONE ENCOUNTER
Health Call Center    Phone Message    May a detailed message be left on voicemail: yes    Reason for Call: Other:   Patient called to follow up on items discussed at last appointment. He did not give specific information. He can be reached at [phone] for follow up.    Action Taken: Message routed to:  Other: Rafia Baca RNCC

## 2019-04-15 NOTE — TELEPHONE ENCOUNTER
Genia Fraser APRN CNP Pratt, Laura, RN   Cc: Maria Elena Chapman APRN CNP   Caller: Unspecified (Today,  2:52 PM)             Good to hear on his safety.     I'm not sure about Maria Elena.     Does Jerad mean medical treatment for pornolography? He may be wondering about options like naltrexone. We agreed at last visit to ideally utilize low libido from his antidepressant increase as a potential benefit for him before discussing other options. He just increased Prozac to 30mg I believe. This is also something we can review in clinic when I see him.     He's active is [employer], has a sponsor and a new therapist I think he is building a rapport with.     Wait to hear from Maria Elena before calling patient back please.     Genia

## 2019-04-15 NOTE — TELEPHONE ENCOUNTER
----- Message -----   From: Genia Fraser, RONALDO CNP   Sent: 4/12/2019   3:55 PM   To: Rafia Baca RN     This patient is going to call us Monday with a safety update (recent SI with plan to use his KIERAN's gun but no intention and no SI today)     He will meet friend Ap bruno to surrender the gun- called him in front of writer.     Left VMs for wife Yodit and friend Ras too.     Increased Prozac today. He agrees to get to  for two in person meetings and two phone meetings weekly plus stay in contact with his sponsor     If he doesn't call by 12p, will you call him please?     Thanks,   Genia

## 2019-04-16 NOTE — TELEPHONE ENCOUNTER
Genia Fraser, Maria Elena Alfred CNP, APRN CNP; Angie Wilcox RN   Cc: Rafia Baca RN   Caller: Unspecified (Yesterday,  2:52 PM)             You are all wonderful!     Angie or Rafia, can you please call Jerad and share these updates? Also, check safety gently. He may benefit with an explanation first that recommendations are based from a starting point that his insurance coverage is through Medicare.     He may also want to talk to his supports and sponsors with  for additional resources.     Please also speak with him about options with [facility] for meds.     Thanks everyone,   Genia

## 2019-04-16 NOTE — TELEPHONE ENCOUNTER
Writer called pt directly. No answer. Left detailed VM, as pt requested this. In VM, writer relayed RONALDO Grant CNP's recommendations of:    -[facility]  -[facility]  -[facility] MD for medical treatment of sexual compulsion  -Meek Franco, individual therapist  -[facility]  -[facility]ion[facility]as[facility]: [facility]    Requested a c/b if he'd like to discuss these options further.

## 2019-04-17 NOTE — TELEPHONE ENCOUNTER
Received incoming phone call from pt. He is more interested in going to an out of state treatment facility for pornography addiction. The pt reports that he already has a therapist and feels more intense treatment is needed. Pt is aware that most facilities do not take Medicare, but is willing to do payment plans if this is an option. He asked this writer about more information about payment plans. Writer uncertain and directed him to call facilities of interest directly. In the meantime, this writer will reach out to provider's and SW to see if they have a list of different facilities for sex addiction/pornography addiction.

## 2019-05-03 ENCOUNTER — TELEPHONE (OUTPATIENT)
Dept: PSYCHIATRY | Facility: CLINIC | Age: 59
End: 2019-05-03

## 2019-05-03 NOTE — TELEPHONE ENCOUNTER
Patient/info Update     Genia Fraser, APRN CNP  You 21 minutes ago (12:08 PM)      Sounds good!    Routing comment

## 2019-05-03 NOTE — TELEPHONE ENCOUNTER
Patient Update   Received: Today   Message Contents   Krunal Alonzo Radhika RN   Phone Number: [phone]             Patient called writer to reschedule a May 16th appointment. Patient had a conflicting appointment that was harder to reschedule, and so patient called and moved appointment to later in the month (May 30th)     Patient did not want Genia to be concerned, patient stated that they are doing well and will be in [place] the week of the 20th which is why we scheduled back so far. Patient does not need a call back, but said you may call patient if you have any questions.      Will route to provider as YODIT

## 2019-05-08 ENCOUNTER — OFFICE VISIT (OUTPATIENT)
Dept: INTERNAL MEDICINE | Facility: CLINIC | Age: 59
End: 2019-05-08
Payer: MEDICARE

## 2019-05-08 ENCOUNTER — TELEPHONE (OUTPATIENT)
Dept: INTERNAL MEDICINE | Facility: CLINIC | Age: 59
End: 2019-05-08

## 2019-05-08 VITALS
BODY MASS INDEX: 27.46 KG/M2 | DIASTOLIC BLOOD PRESSURE: 83 MMHG | HEART RATE: 66 BPM | SYSTOLIC BLOOD PRESSURE: 120 MMHG | OXYGEN SATURATION: 96 % | WEIGHT: 174 LBS | RESPIRATION RATE: 16 BRPM

## 2019-05-08 DIAGNOSIS — Z00.00 ROUTINE HEALTH MAINTENANCE: ICD-10-CM

## 2019-05-08 DIAGNOSIS — Z94.0 KIDNEY REPLACED BY TRANSPLANT: ICD-10-CM

## 2019-05-08 DIAGNOSIS — I10 HYPERTENSION, UNSPECIFIED TYPE: ICD-10-CM

## 2019-05-08 DIAGNOSIS — I10 HTN (HYPERTENSION): ICD-10-CM

## 2019-05-08 DIAGNOSIS — G47.33 OSA (OBSTRUCTIVE SLEEP APNEA): Primary | ICD-10-CM

## 2019-05-08 DIAGNOSIS — F32.A DEPRESSION: ICD-10-CM

## 2019-05-08 DIAGNOSIS — I25.10 CORONARY ARTERY DISEASE INVOLVING NATIVE CORONARY ARTERY OF NATIVE HEART WITHOUT ANGINA PECTORIS: ICD-10-CM

## 2019-05-08 DIAGNOSIS — R07.9 ACUTE CHEST PAIN: ICD-10-CM

## 2019-05-08 DIAGNOSIS — I15.1 HTN, KIDNEY TRANSPLANT RELATED: ICD-10-CM

## 2019-05-08 DIAGNOSIS — B18.1 CHRONIC HEPATITIS B (H): ICD-10-CM

## 2019-05-08 DIAGNOSIS — I21.4 NSTEMI (NON-ST ELEVATED MYOCARDIAL INFARCTION) (H): ICD-10-CM

## 2019-05-08 DIAGNOSIS — Z94.0 HTN, KIDNEY TRANSPLANT RELATED: ICD-10-CM

## 2019-05-08 LAB
CHOLEST SERPL-MCNC: 164 MG/DL
HDLC SERPL-MCNC: 74 MG/DL
LDLC SERPL CALC-MCNC: 66 MG/DL
NONHDLC SERPL-MCNC: 89 MG/DL
TRIGL SERPL-MCNC: 117 MG/DL

## 2019-05-08 RX ORDER — OMEPRAZOLE 20 MG/1
20 TABLET, DELAYED RELEASE ORAL DAILY
Qty: 100 TABLET | Refills: 3 | Status: SHIPPED | OUTPATIENT
Start: 2019-05-08 | End: 2019-05-08 | Stop reason: ALTCHOICE

## 2019-05-08 RX ORDER — CLOPIDOGREL BISULFATE 75 MG/1
75 TABLET ORAL DAILY
Qty: 100 TABLET | Refills: 3 | Status: SHIPPED | OUTPATIENT
Start: 2019-05-08 | End: 2020-05-18

## 2019-05-08 RX ORDER — AMLODIPINE BESYLATE 5 MG/1
5 TABLET ORAL DAILY
Qty: 100 TABLET | Refills: 3 | Status: SHIPPED | OUTPATIENT
Start: 2019-05-08 | End: 2020-05-18

## 2019-05-08 RX ORDER — ATORVASTATIN CALCIUM 20 MG/1
20 TABLET, FILM COATED ORAL DAILY
Qty: 100 TABLET | Refills: 3 | Status: SHIPPED | OUTPATIENT
Start: 2019-05-08 | End: 2020-05-18

## 2019-05-08 RX ORDER — ISOSORBIDE MONONITRATE 30 MG/1
15 TABLET, EXTENDED RELEASE ORAL DAILY
Qty: 50 TABLET | Refills: 3 | Status: SHIPPED | OUTPATIENT
Start: 2019-05-08 | End: 2020-05-18

## 2019-05-08 ASSESSMENT — ENCOUNTER SYMPTOMS
MUSCLE CRAMPS: 0
DECREASED CONCENTRATION: 1
DEPRESSION: 1
PANIC: 0
BACK PAIN: 1
NERVOUS/ANXIOUS: 1
STIFFNESS: 1
MYALGIAS: 1
ARTHRALGIAS: 1
NECK PAIN: 0
MUSCLE WEAKNESS: 0
JOINT SWELLING: 1
INSOMNIA: 1

## 2019-05-08 ASSESSMENT — PAIN SCALES - GENERAL: PAINLEVEL: NO PAIN (0)

## 2019-05-08 NOTE — PATIENT INSTRUCTIONS
[facility] Medication Refill Request Information:  * Please contact your pharmacy regarding ANY request for medication refills.  ** [facility] Prescription Fax = [phone]  * Please allow 3 business days for routine medication refills.  * Please allow 5 business days for controlled substance medication refills.     [facility] Test Result notification information:  *You will be notified with in 7-10 days of your appointment day regarding the results of your test.  If you are on MyChart you will be notified as soon as the provider has reviewed the results and signed off on them.    [facility]: [phone]

## 2019-05-08 NOTE — PROGRESS NOTES
HPI  59-year-old presents today for physical examination.  He has been doing reasonably well.  He is not having any chest pain or dyspnea he is walking up to a mile and a half a day at a time tolerating this well without symptoms or problems.  He does have some occasional pain and discomfort in his joints.  He has had both hips and both knees replaced.  He has absent vision in the right eye but good vision in the left.  Otherwise he is trying to eat healthy.  He needs to participate in a rehabilitation program as he has had some fixation on pornography which is interfered with his marriage and he is going to a program in [place] for this and needed a letter stating that he was medically stable for this.  Otherwise he has been feeling satisfactorily and has no other specific complaints or concerns today.  Past Medical History:   Diagnosis Date     AION (acute ischemic optic neuropathy)      Anemia in chronic renal disease      Avascular necrosis of bones of both hips (H)     s/p bilateral hip replacements     Basal cell carcinoma      Chronic hepatitis B (H)      Clostridium difficile colitis      Coronary artery disease involving native coronary artery of native heart without angina pectoris 6/17/2014    Coronary angiogram 6/17/14: Severe distal 3-vessel disease involving small vessels, not amenable to PCI or CABG.     CRP elevated      Depression      Diverticulosis      Dyslipidemia      FSGS (focal segmental glomerulosclerosis)      Gastric ulcer with hemorrhage 2/12/12     GERD (gastroesophageal reflux disease)      Glaucoma     OHTN     Hemorrhoids      Hypertension secondary to other renal disorders      Hypogonadism in male      Immunosuppressed status (H)      Kidney replaced by transplant     focal glomerulosclerosis      NSTEMI (non-ST elevated myocardial infarction) (H) 6/17/2014     JULIANE (obstructive sleep apnea)     Doesn't use CPAP     Paracentral scotoma     LE     Secondary renal hyperparathyroidism  (H)      Squamous cell carcinoma      Past Surgical History:   Procedure Laterality Date     APPENDECTOMY       CATARACT IOL, RT/LT  4/19/2000    RE     CATARACT IOL, RT/LT  6/1/2000    LE     COLECTOMY SUBTOTAL  1983    10 cm, diverticulitis     COLONOSCOPY  2/13/2012    Procedure:COLONOSCOPY; Surgeon:SLOAN GALLARDO; Location: GI     ESOPHAGOSCOPY, GASTROSCOPY, DUODENOSCOPY (EGD), COMBINED  2/13/2012    Procedure:COMBINED ESOPHAGOSCOPY, GASTROSCOPY, DUODENOSCOPY (EGD); Surgeon:SLOAN GALLARDO; Location: GI     EXTRACAPSULAR CATARACT EXTRATION WITH INTRAOCULAR LENS IMPLANT  4-20-10, 6-1-10    Rt, Lt     HERNIA REPAIR  1995    Lt inguinal     HIP SURGERY      1981, bilat MITZI, revised 2001, 2005     KIDNEY SURGERY  1978 and 1993    transplant     KNEE SURGERY  1983, 1987    bilat TKA     MOHS MICROGRAPHIC PROCEDURE       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, auxiliary spleen     TONSILLECTOMY       Family History   Problem Relation Age of Onset     Cardiovascular Father         AI with valve repair     Hypertension Father      Kidney Disease Father      Cancer Paternal Grandmother         ovarian ca     Cerebrovascular Disease Paternal Grandfather      Cerebrovascular Disease Maternal Grandfather      Kidney Disease Paternal Aunt      Skin Cancer No family hx of      Glaucoma No family hx of      Macular Degeneration No family hx of      Amblyopia No family hx of      Melanoma No family hx of      Social History     Socioeconomic History     Marital status:      Spouse name: None     Number of children: 0     Years of education: None     Highest education level: None   Occupational History     Employer: DISABLED     Occupation:      Employer: [employer]   Social Needs     Financial resource strain: None     Food insecurity:     Worry: None     Inability: None     Transportation needs:     Medical: None     Non-medical: None   Tobacco Use     Smoking status: Former  Smoker     Packs/day: 1.00     Years: 9.00     Pack years: 9.00     Last attempt to quit: 1988     Years since quittin.3     Smokeless tobacco: Former User   Substance and Sexual Activity     Alcohol use: Yes     Alcohol/week: 0.0 oz     Comment: rarely 1 drink per month     Drug use: No     Sexual activity: None   Lifestyle     Physical activity:     Days per week: None     Minutes per session: None     Stress: None   Relationships     Social connections:     Talks on phone: None     Gets together: None     Attends Temple service: None     Active member of club or organization: None     Attends meetings of clubs or organizations: None     Relationship status: None     Intimate partner violence:     Fear of current or ex partner: None     Emotionally abused: None     Physically abused: None     Forced sexual activity: None   Other Topics Concern     Parent/sibling w/ CABG, MI or angioplasty before 65F 55M? Not Asked      Service Not Asked     Blood Transfusions Not Asked     Caffeine Concern Not Asked     Occupational Exposure Not Asked     Hobby Hazards Not Asked     Sleep Concern Not Asked     Stress Concern Not Asked     Weight Concern Not Asked     Special Diet No     Back Care Not Asked     Exercise Yes     Comment: walks     Bike Helmet Not Asked     Seat Belt Not Asked     Self-Exams Not Asked   Social History Narrative    . Wife is also a kidney transplant recipient.     Works part-time     Answers for HPI/ROS submitted by the patient on 2019   General Symptoms: No  Skin Symptoms: No  HENT Symptoms: No  EYE SYMPTOMS: No  HEART SYMPTOMS: No  LUNG SYMPTOMS: No  INTESTINAL SYMPTOMS: No  URINARY SYMPTOMS: No  REPRODUCTIVE SYMPTOMS: No  SKELETAL SYMPTOMS: Yes  BLOOD SYMPTOMS: No  NERVOUS SYSTEM SYMPTOMS: No  MENTAL HEALTH SYMPTOMS: Yes  Back pain: Yes  Muscle aches: Yes  Neck pain: No  Swollen joints: Yes  Joint pain: Yes  Bone pain: No  Muscle cramps: No  Muscle weakness: No  Joint  stiffness: Yes  Bone fracture: No  Nervous or Anxious: Yes  Depression: Yes  Trouble sleeping: Yes  Trouble thinking or concentrating: Yes  Mood changes: Yes  Panic attacks: No    Exam:  /83   Pulse 66   Resp 16   Wt 78.9 kg (174 lb)   SpO2 96%   BMI 27.46 kg/m    174 lbs 0 oz  Physical Exam   The patient is alert, oriented with a clear sensorium.   Skin shows numerous keratotic lesions or rashes and good turgor.   Head is normocephalic and atraumatic.   Eyes show PERRLA with benign optic fundi.   Ears show normal TMs bilaterally.   Mouth shows clear oral mucosa.   Neck shows no nodes, thyromegaly or bruits.   Back is non tender.  Lungs are clear to percussion and auscultation.   Heart shows normal S1 and S2 without murmur or gallop.   Abdomen is soft and nontender kidney I the LLQ without masses or organomegaly.   Genitalia show normal testes. No evidence of inguinal hernia.  Rectal shows small smooth prostate without nodules or masses.  Extremities show no edema and no evidence of active synovitis.   Neurologic examination shows cranial nerves II-XII intact. Motor shows 5/5 strength. Reflexes are symmetric. Cerebellar testing shows normal tandem gait.  Romberg negative.    ASSESSMENT  1 status post kidney transplant  2 coronary artery disease asymptomatic  3 hypertension well controlled  4 depression in remission  5 hyperlipidemia needs reassessment  6 history of sleep apnea  7 pornography fixation scheduled for therapy    Plan  I gave him a letter for his treatment plan going to get his labs checked today refilled his medications update his immunizations with a tetanus he is also due for a colonoscopy and will have him reassessed in a year sooner if any increased symptoms or problems    This note was completed using Dragon voice recognition software.  Although reviewed after completion, some word and grammatical errors may occur.    Aston Perry MD  General Internal Medicine  Primary Care  [place]  [phone]

## 2019-05-08 NOTE — NURSING NOTE
Chief Complaint   Patient presents with     Physical     Pt is here for annual physical.      Laina Martínez LPN at 9:12 AM on 5/8/2019.

## 2019-05-08 NOTE — NURSING NOTE
Jerad Ross comes into clinic today at the request of Dr Aston Perry, Ordering Provider for an immunization/s.    I gave the TD immunization/s while the patient was in clinic. They received an informational sheet and I explained the common side effects of the injection. The patient tolerated the procedure well and had no complications while here in clinic.     This service provided today was under the supervising provider of the day Dr. Aston Perry, who was available if needed.    Venecia Lugo, EMT at 10:16 AM on 5/8/2019.

## 2019-05-08 NOTE — LETTER
[facility]  [address]  [address] [facility] [address] [phone]          May 8, 2019    RE:  Jerad Ross                                                                                                                                                       [address]  [facility] [place] [address]            To whom it may concern:    Jerad Ross is under my professional care for    JULIANE (obstructive sleep apnea)  Coronary artery disease involving native coronary artery of native heart without angina pectoris  HTN, kidney transplant related  Kidney replaced by transplant     He  may participate in emotionally challenging therapy.  There are no unstable medical conditions or medical contraindications.      Sincerely,        Aston Perry MD

## 2019-05-08 NOTE — TELEPHONE ENCOUNTER
[facility] Call Center    Phone Message    May a detailed message be left on voicemail: yes    Reason for Call: Other: Per call from Pharmacy insurance doesn't cover the omeprazole (PRILOSEC OTC) 20 MG EC tablet due to the tablet form but does covers the capsule form.  Please resent a new RX or call for further information.      Action Taken: Message routed to:  [facility] ([facility] Primary Care

## 2019-05-30 ENCOUNTER — OFFICE VISIT (OUTPATIENT)
Dept: PSYCHIATRY | Facility: CLINIC | Age: 59
End: 2019-05-30
Attending: NURSE PRACTITIONER
Payer: MEDICARE

## 2019-05-30 VITALS
SYSTOLIC BLOOD PRESSURE: 117 MMHG | BODY MASS INDEX: 27.96 KG/M2 | HEART RATE: 65 BPM | WEIGHT: 177.2 LBS | DIASTOLIC BLOOD PRESSURE: 79 MMHG

## 2019-05-30 DIAGNOSIS — F33.41 RECURRENT MAJOR DEPRESSIVE DISORDER, IN PARTIAL REMISSION (H): ICD-10-CM

## 2019-05-30 PROCEDURE — G0463 HOSPITAL OUTPT CLINIC VISIT: HCPCS | Mod: ZF

## 2019-05-30 ASSESSMENT — PATIENT HEALTH QUESTIONNAIRE - PHQ9: SUM OF ALL RESPONSES TO PHQ QUESTIONS 1-9: 1

## 2019-05-30 ASSESSMENT — PAIN SCALES - GENERAL: PAINLEVEL: NO PAIN (0)

## 2019-05-30 NOTE — NURSING NOTE
Chief Complaint   Patient presents with     Recheck Medication     Recurrent major depressive disorder, in partial remission (H)
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
## 2019-06-03 ENCOUNTER — TELEPHONE (OUTPATIENT)
Dept: INTERNAL MEDICINE | Facility: CLINIC | Age: 59
End: 2019-06-03

## 2019-06-03 NOTE — TELEPHONE ENCOUNTER
Prior Authorization Retail Medication Request    Medication/Dose: omeprazole (PRILOSEC) 20 MG DR capsule  ICD code (if different than what is on RX):   Previously Tried and Failed:  unknown  Rationale:  none    Insurance Name:  Medicare  Insurance ID:  [id number]

## 2019-06-03 NOTE — TELEPHONE ENCOUNTER
Prior Authorization Not Needed per Insurance    Medication: omeprazole (PRILOSEC) 20 MG DR capsule-PA NOT NEEDED   Insurance Company: [employer] - Phone [phone] Fax [phone]  Expected CoPay: $2.31    Pharmacy Filling the Rx: [facility] [address] [place] [address] [address] [facility]  Pharmacy Notified: Yes  Patient Notified: No    Called pharmacy and pharmacy stated that PA is Not Needed and medication is covered. Pharmacy stated that medication was last filled on 5/8/2019 quantity 90 for 90 day supply.

## 2019-06-03 NOTE — TELEPHONE ENCOUNTER
Health Call Center    Phone Message    May a detailed message be left on voicemail: yes    Reason for Call: Medication Question or concern regarding medication   Prescription Clarification  Name of Medication: omeprazole (PRILOSEC) 20 MG DR capsule  Prescribing Provider: Aston marie   Pharmacy: [facility] [address] [place] [address] [address] AT [facility]     What on the order needs clarification? Pharmacist called and they are requesting clarification on the medication as it interacts with another medication that the patient is prescribed. Also stated this patients insurance does not cover this medication and they would like an alternative option provided. Please call to discuss with pharmacy.          Action Taken: Message routed to:  [facility] LIBAN

## 2019-06-05 ENCOUNTER — TELEPHONE (OUTPATIENT)
Dept: INTERNAL MEDICINE | Facility: CLINIC | Age: 59
End: 2019-06-05

## 2019-06-05 DIAGNOSIS — K21.9 GASTROESOPHAGEAL REFLUX DISEASE WITHOUT ESOPHAGITIS: Primary | ICD-10-CM

## 2019-06-05 RX ORDER — PANTOPRAZOLE SODIUM 40 MG/1
40 TABLET, DELAYED RELEASE ORAL DAILY
Qty: 90 TABLET | Refills: 3 | Status: SHIPPED | OUTPATIENT
Start: 2019-06-05 | End: 2020-06-19

## 2019-06-05 NOTE — TELEPHONE ENCOUNTER
Health Call Center    Phone Message    May a detailed message be left on voicemail: yes    Reason for Call: Medication Question or concern regarding medication--DRUG INTERACTION BETWEEN TWO RX'S, and pharmacy is requesting an alternative for the omeprazole, please. Also, omeprazole is not covered by pt's insurance.  Prescription Clarification  Name of Medication: Interaction between clopidogrel (PLAVIX) 75 MG tablet   AND omeprazole (PRILOSEC) 20 MG DR capsule  Prescribing Provider: Dr. Aston Perry   Pharmacy: [facility] on [address] [place]   What on the order needs clarification? Needing an alternative for omeprazole, please.          Action Taken: Message routed to:  [facility] RACHEAL PCC

## 2019-06-05 NOTE — TELEPHONE ENCOUNTER
Spoke to pharmacy to relay that a new Rx was called into the pharmacy. Britany Love LPN 6/5/2019 12:11 PM

## 2019-06-07 ENCOUNTER — OFFICE VISIT (OUTPATIENT)
Dept: OPHTHALMOLOGY | Facility: CLINIC | Age: 59
End: 2019-06-07
Attending: OPHTHALMOLOGY
Payer: MEDICARE

## 2019-06-07 ENCOUNTER — TELEPHONE (OUTPATIENT)
Dept: OPHTHALMOLOGY | Facility: CLINIC | Age: 59
End: 2019-06-07

## 2019-06-07 DIAGNOSIS — H26.491 POSTERIOR CAPSULAR OPACIFICATION OF RIGHT EYE, OBSCURING VISION: Primary | ICD-10-CM

## 2019-06-07 DIAGNOSIS — H46.9 OPTIC NEUROPATHY: ICD-10-CM

## 2019-06-07 PROCEDURE — G0463 HOSPITAL OUTPT CLINIC VISIT: HCPCS | Mod: ZF | Performed by: TECHNICIAN/TECHNOLOGIST

## 2019-06-07 PROCEDURE — G0463 HOSPITAL OUTPT CLINIC VISIT: HCPCS

## 2019-06-07 PROCEDURE — 92133 CPTRZD OPH DX IMG PST SGM ON: CPT | Mod: ZF | Performed by: OPHTHALMOLOGY

## 2019-06-07 PROCEDURE — 92083 EXTENDED VISUAL FIELD XM: CPT | Mod: ZF | Performed by: OPHTHALMOLOGY

## 2019-06-07 ASSESSMENT — CONF VISUAL FIELD
OD_SUPERIOR_NASAL_RESTRICTION: 3
OD_INFERIOR_TEMPORAL_RESTRICTION: 1
OD_SUPERIOR_TEMPORAL_RESTRICTION: 1
OS_NORMAL: 1
OD_INFERIOR_NASAL_RESTRICTION: 1
METHOD: COUNTING FINGERS

## 2019-06-07 ASSESSMENT — TONOMETRY
OS_IOP_MMHG: 21
IOP_METHOD: ICARE
OD_IOP_MMHG: 21

## 2019-06-07 ASSESSMENT — VISUAL ACUITY
OS_SC+: -1
OS_SC: 20/40
OD_SC: 20/600
METHOD: SNELLEN - LINEAR

## 2019-06-07 ASSESSMENT — SLIT LAMP EXAM - LIDS
COMMENTS: NORMAL
COMMENTS: NORMAL

## 2019-06-07 ASSESSMENT — CUP TO DISC RATIO
OS_RATIO: 0.65
OD_RATIO: 0.2

## 2019-06-07 ASSESSMENT — EXTERNAL EXAM - LEFT EYE: OS_EXAM: NORMAL

## 2019-06-07 ASSESSMENT — EXTERNAL EXAM - RIGHT EYE: OD_EXAM: NORMAL

## 2019-06-07 NOTE — TELEPHONE ENCOUNTER
Dr. Monte able to see this afternoon.  Pt aware of date/time/location  Mike Ruano RN 10:16 AM 06/07/19    ---  Spoke to pt at 0917  H/o decrease vision in right eye by history    Pt noticing since weds worsening of vision-- more periphery  Used to have areas could see in peripheral vision, but not as clear now  No eye pain, no headache    Left eye stable    H/o anterior ischemic optic neuropathy    Will review plan with neuro-ophthalmology team and call back  Mike Ruano RN 9:23 AM 06/07/19              Left message with direct number at [phone]  Mike Ruano RN 8:33 AM 06/07/19        [facility]    Phone Message    May a detailed message be left on voicemail: yes    Reason for Call: Symptoms or Concerns     If patient has red-flag symptoms, warm transfer to triage line    Current symptom or concern: decreased vision in Rt Eye    Symptoms have been present for:  2-3 day(s)    Has patient previously been seen for this? Yes    By : Dr Bradly Juarez    Date: 08/07/18    Are there any new or worsening symptoms? Yes: new - Pt is worried that it could be related to ischemic optic neuropathy    Please return Pt call      Action Taken: Message routed to:  [facility] Eye Clinic

## 2019-06-07 NOTE — PROGRESS NOTES
Assessment & Plan     Jerad Ross is a 59 year old male with the following diagnoses:   1. Posterior capsular opacification of right eye, obscuring vision    2. Optic neuropathy       Here for acute visit.     Initial episode of anterior ischemic optic neuropathy in 2003 in left eye with another episode in 2005 in right eye. Last  Visit with Dr. Juarez on 8/2018. He had bilateral diffuse optic nerve thinning. Examination otherwise showed posterior capsular opacity (PCO) of the right eye.     He noticed that the whole vision right eye has gotten blurrier (noticed maybe this past Wednesday - 2 days ago). No pain. It's difficult for him to notice vision changes right eye because the vision is so poor. No changes to left eye vision.     He has been noticing some mild headaches this past week but nothing too severe. No jaw claudication. No scalp tenderness. No recent weight loss or decreased appetite.      On mycophenylate and prednisone 5 mg for history of kidney transplant in 1993.    On examination, his visual acuity is down to 20/600 right eye (from 20/300), left eye stable at 20/40. I did not appreciate a right afferent pupillary defect today. Color plates 0/11 right eye and 10/11 left eye. Anterior segment examination shows posterior chamber intraocular lens (PCIOL) with very dense posterior capsular opacity (PCO) right eye (definite progression from last visit). retinal nerve fiber layer OCT unreliable (no view right eye). GTOP visual fields shows worsening generalized depression right eye. Good bilateral temporal pulses.     It is my impression that Jerad has a dense posterior capsular opacification that is causing his visual decline right eye. There are no other changes on examination. Giant cell arteritis symptoms negative. He has an appointment with Dr. Rizzo in October and would prefer to have Dr. Rizzo perform a laser procedure then, if possible.        Complete documentation of historical and  exam elements from today's encounter can be found in the full encounter summary report (not reduplicated in this progress note).  I personally obtained the chief complaint(s) and history of present illness.  I confirmed and edited as necessary the review of systems, past medical/surgical history, family history, social history, and examination findings as documented by others; and I examined the patient myself.  I personally reviewed the relevant tests, images, and reports as documented above.  I formulated and edited as necessary the assessment and plan and discussed the findings and management plan with the patient and family. - Gagan Monte MD

## 2019-06-07 NOTE — Clinical Note
6/7/2019       RE: Jerad Ross  [address]  [place] [facility] [address]     Dear Colleague,    Thank you for referring your patient, Jerad Ross, to the EYE CLINIC at [facility]. Please see a copy of my visit note below.           Assessment & Plan     Jerad Ross is a 59 year old male with the following diagnoses:   1. PCO (posterior capsular opacification), bilateral    2. Optic neuropathy       Here for acute visit.     Initial episode of anterior ischemic optic neuropathy in 2003 in left eye with another episode in 2005 in right eye. Last  Visit with Dr. Juarez on 8/2018. He had bilateral diffuse optic nerve thinning. Examination otherwise showed posterior capsular opacity (PCO) of the right eye.     He noticed that the whole vision right eye has gotten blurrier (noticed maybe this past Wednesday - 2 days ago). No pain. It's difficult for him to notice vision changes right eye because the vision is so poor. No changes to left eye vision.     He has been noticing some mild headaches this past week but nothing too severe. No jaw claudication. No scalp tenderness. No recent weight loss or decreased appetite.      On mycophenylate and prednisone 5 mg for history of kidney transplant in 1993.    On examination, his visual acuity is down to 20/600 right eye (from 20/300), left eye stable at 20/40. I did not appreciate a right afferent pupillary defect today. Color plates 0/11 right eye and 10/11 left eye. Anterior segment examination shows posterior chamber intraocular lens (PCIOL) with very dense posterior capsular opacity (PCO) right eye (definite progression from last visit). retinal nerve fiber layer OCT unreliable (no view right eye). GTOP visual fields shows worsening generalized depression right eye. Good bilateral temporal pulses.     It is my impression that Jerad has a dense posterior capsular opacification that is causing his visual decline  right eye. There are no other changes on examination. Giant cell arteritis symptoms negative. He has an appointment with Dr. Rizzo in October and would prefer to have Dr. Rizzo perform a laser procedure then, if possible.        Complete documentation of historical and exam elements from today's encounter can be found in the full encounter summary report (not reduplicated in this progress note).  I personally obtained the chief complaint(s) and history of present illness.  I confirmed and edited as necessary the review of systems, past medical/surgical history, family history, social history, and examination findings as documented by others; and I examined the patient myself.  I personally reviewed the relevant tests, images, and reports as documented above.  I formulated and edited as necessary the assessment and plan and discussed the findings and management plan with the patient and family. - Gagan Monte MD        Again, thank you for allowing me to participate in the care of your patient.      Sincerely,    Gagan Monte MD

## 2019-06-07 NOTE — LETTER
6/7/2019      RE: Jerad Ross  [address]  [place] [address]              Assessment & Plan     Jerad Ross is a 59 year old male with the following diagnoses:   1. Posterior capsular opacification of right eye, obscuring vision    2. Optic neuropathy       Here for acute visit.     Initial episode of anterior ischemic optic neuropathy in 2003 in left eye with another episode in 2005 in right eye. Last  Visit with Dr. Juarez on 8/2018. He had bilateral diffuse optic nerve thinning. Examination otherwise showed posterior capsular opacity (PCO) of the right eye.     He noticed that the whole vision right eye has gotten blurrier (noticed maybe this past Wednesday - 2 days ago). No pain. It's difficult for him to notice vision changes right eye because the vision is so poor. No changes to left eye vision.     He has been noticing some mild headaches this past week but nothing too severe. No jaw claudication. No scalp tenderness. No recent weight loss or decreased appetite.      On mycophenylate and prednisone 5 mg for history of kidney transplant in 1993.    On examination, his visual acuity is down to 20/600 right eye (from 20/300), left eye stable at 20/40. I did not appreciate a right afferent pupillary defect today. Color plates 0/11 right eye and 10/11 left eye. Anterior segment examination shows posterior chamber intraocular lens (PCIOL) with very dense posterior capsular opacity (PCO) right eye (definite progression from last visit). retinal nerve fiber layer OCT unreliable (no view right eye). GTOP visual fields shows worsening generalized depression right eye. Good bilateral temporal pulses.     It is my impression that Jerad has a dense posterior capsular opacification that is causing his visual decline right eye. There are no other changes on examination. Giant cell arteritis symptoms negative. He has an appointment with Dr. Rizzo in October and would prefer to have Dr. Rizzo  perform a laser procedure then, if possible.        Complete documentation of historical and exam elements from today's encounter can be found in the full encounter summary report (not reduplicated in this progress note).  I personally obtained the chief complaint(s) and history of present illness.  I confirmed and edited as necessary the review of systems, past medical/surgical history, family history, social history, and examination findings as documented by others; and I examined the patient myself.  I personally reviewed the relevant tests, images, and reports as documented above.  I formulated and edited as necessary the assessment and plan and discussed the findings and management plan with the patient and family. - MD Gagan Schneider MD

## 2019-06-27 ENCOUNTER — OFFICE VISIT (OUTPATIENT)
Dept: PSYCHIATRY | Facility: CLINIC | Age: 59
End: 2019-06-27
Attending: NURSE PRACTITIONER
Payer: MEDICARE

## 2019-06-27 VITALS
BODY MASS INDEX: 27.9 KG/M2 | SYSTOLIC BLOOD PRESSURE: 123 MMHG | DIASTOLIC BLOOD PRESSURE: 83 MMHG | WEIGHT: 176.8 LBS | HEART RATE: 75 BPM

## 2019-06-27 DIAGNOSIS — F33.41 RECURRENT MAJOR DEPRESSIVE DISORDER, IN PARTIAL REMISSION (H): ICD-10-CM

## 2019-06-27 PROCEDURE — G0463 HOSPITAL OUTPT CLINIC VISIT: HCPCS | Mod: ZF

## 2019-06-27 RX ORDER — FLUOXETINE 10 MG/1
10 CAPSULE ORAL DAILY
Qty: 90 CAPSULE | Refills: 0 | Status: SHIPPED | OUTPATIENT
Start: 2019-06-27 | End: 2019-10-02

## 2019-06-27 ASSESSMENT — PAIN SCALES - GENERAL: PAINLEVEL: NO PAIN (0)
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
## 2019-06-28 ASSESSMENT — PATIENT HEALTH QUESTIONNAIRE - PHQ9: SUM OF ALL RESPONSES TO PHQ QUESTIONS 1-9: 5

## 2019-07-02 ENCOUNTER — TELEPHONE (OUTPATIENT)
Dept: TRANSPLANT | Facility: CLINIC | Age: 59
End: 2019-07-02

## 2019-07-02 NOTE — TELEPHONE ENCOUNTER
Provider Call: General  Route to LPN    Reason for call: [facility] needs the certification for medical necessity form filled out. Please connect.    Call back needed? Yes    Return Call Needed  Same as documented in contacts section  When to return call?: Greater than one day: Route standard priority

## 2019-07-25 ENCOUNTER — OFFICE VISIT (OUTPATIENT)
Dept: PSYCHIATRY | Facility: CLINIC | Age: 59
End: 2019-07-25
Attending: NURSE PRACTITIONER
Payer: MEDICARE

## 2019-07-25 VITALS
HEART RATE: 74 BPM | BODY MASS INDEX: 28.4 KG/M2 | WEIGHT: 180 LBS | SYSTOLIC BLOOD PRESSURE: 118 MMHG | DIASTOLIC BLOOD PRESSURE: 81 MMHG

## 2019-07-25 DIAGNOSIS — F33.41 RECURRENT MAJOR DEPRESSIVE DISORDER, IN PARTIAL REMISSION (H): ICD-10-CM

## 2019-07-25 PROCEDURE — G0463 HOSPITAL OUTPT CLINIC VISIT: HCPCS | Mod: ZF

## 2019-07-25 RX ORDER — FLUOXETINE 10 MG/1
10 CAPSULE ORAL DAILY
Qty: 90 CAPSULE | Refills: 0 | Status: CANCELLED | OUTPATIENT
Start: 2019-07-25

## 2019-07-25 RX ORDER — FLUOXETINE 40 MG/1
40 CAPSULE ORAL DAILY
Qty: 90 CAPSULE | Refills: 3 | Status: CANCELLED | OUTPATIENT
Start: 2019-07-25

## 2019-07-25 ASSESSMENT — PAIN SCALES - GENERAL: PAINLEVEL: MODERATE PAIN (4)

## 2019-07-25 ASSESSMENT — PATIENT HEALTH QUESTIONNAIRE - PHQ9: SUM OF ALL RESPONSES TO PHQ QUESTIONS 1-9: 4

## 2019-07-25 NOTE — NURSING NOTE
Chief Complaint   Patient presents with     Recheck Medication     Recurrent major depressive disorder, in partial remission
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
## 2019-07-26 ENCOUNTER — TRANSFERRED RECORDS (OUTPATIENT)
Dept: HEALTH INFORMATION MANAGEMENT | Facility: CLINIC | Age: 59
End: 2019-07-26

## 2019-08-19 DIAGNOSIS — Z94.0 KIDNEY TRANSPLANTED: Primary | ICD-10-CM

## 2019-08-20 RX ORDER — MYCOPHENOLATE MOFETIL 250 MG/1
750 CAPSULE ORAL 2 TIMES DAILY
Qty: 180 CAPSULE | Refills: 0 | Status: SHIPPED | OUTPATIENT
Start: 2019-08-20 | End: 2019-09-11

## 2019-09-10 ENCOUNTER — TELEPHONE (OUTPATIENT)
Dept: PSYCHIATRY | Facility: CLINIC | Age: 59
End: 2019-09-10

## 2019-09-10 DIAGNOSIS — J45.909 ASTHMA: ICD-10-CM

## 2019-09-10 NOTE — TELEPHONE ENCOUNTER
An JAZMINE was signed on 9/5/2019 authorizing the release of information from [facility] to [facility] .  I sent the document to medical records (along with what appears to be an invoice)- via the tube system- on 9/10/2019 and held a copy in Psychiatry until scanning complete/confirmed.Carolyn Candelario/ERIC

## 2019-09-11 ENCOUNTER — TELEPHONE (OUTPATIENT)
Dept: TRANSPLANT | Facility: CLINIC | Age: 59
End: 2019-09-11

## 2019-09-11 DIAGNOSIS — H40.053 BORDERLINE GLAUCOMA WITH OCULAR HYPERTENSION, BILATERAL: ICD-10-CM

## 2019-09-11 DIAGNOSIS — Z94.0 KIDNEY TRANSPLANTED: ICD-10-CM

## 2019-09-11 RX ORDER — LATANOPROST 50 UG/ML
1 SOLUTION/ DROPS OPHTHALMIC AT BEDTIME
Qty: 2.5 ML | Refills: 0 | Status: SHIPPED | OUTPATIENT
Start: 2019-09-11 | End: 2019-09-11

## 2019-09-11 RX ORDER — LATANOPROST 50 UG/ML
1 SOLUTION/ DROPS OPHTHALMIC AT BEDTIME
Qty: 2.5 ML | Refills: 0 | Status: SHIPPED | OUTPATIENT
Start: 2019-09-11 | End: 2019-10-09

## 2019-09-11 RX ORDER — MYCOPHENOLATE MOFETIL 250 MG/1
750 CAPSULE ORAL 2 TIMES DAILY
Qty: 180 CAPSULE | Refills: 0 | Status: SHIPPED | OUTPATIENT
Start: 2019-09-11 | End: 2019-10-11

## 2019-09-11 NOTE — TELEPHONE ENCOUNTER
Issue:    Jerad Ross requesting IS refill and overdue for labs.  Clinic appt in October.    Plan:    Call placed to Jerad Ross, he v/u complete tx labs this month and recommendation to complete labs every 3 months.

## 2019-09-11 NOTE — TELEPHONE ENCOUNTER
Medication: latanoprost (XALATAN) 0.005 % ophthalmic solution    Requested directions: 1 drop into both eyes At Bedtime  Current directions on the medication list: same    Last Written Prescription Date:  11-6-17  Last Fill Quantity: 3 bottle,   # refills: 3    Last Office Visit: 6-7-19 Lavell  11-6-17 Matthias  Future Office visit: 10-9-19 Matthias    Attending Provider: Matthias  Last Clinic Note: 6-7-19 Lavell: med not in clinicnote/plan  11-6-17 Matthias:Latanoprost both eyes at bedtime   Plan  Continue latanoprost both eyes     Routing refill request to provider for review/approval because:  Med not in last clinic note/plan  Last appt with Dr. Rizzo 11-6-17

## 2019-09-12 NOTE — TELEPHONE ENCOUNTER
Will prescribe patient one bottle to last until next appointment with Dr. Rizzo in October. Patient needs to be seen in clinic for future refills.  -Darin Terrazas MD

## 2019-09-20 ENCOUNTER — OFFICE VISIT (OUTPATIENT)
Dept: FAMILY MEDICINE | Facility: CLINIC | Age: 59
End: 2019-09-20
Payer: MEDICARE

## 2019-09-20 ENCOUNTER — ANCILLARY PROCEDURE (OUTPATIENT)
Dept: GENERAL RADIOLOGY | Facility: CLINIC | Age: 59
End: 2019-09-20
Attending: NURSE PRACTITIONER
Payer: MEDICARE

## 2019-09-20 VITALS
HEIGHT: 68 IN | HEART RATE: 81 BPM | SYSTOLIC BLOOD PRESSURE: 116 MMHG | DIASTOLIC BLOOD PRESSURE: 81 MMHG | TEMPERATURE: 98.1 F | RESPIRATION RATE: 16 BRPM | OXYGEN SATURATION: 93 % | BODY MASS INDEX: 26.75 KG/M2 | WEIGHT: 176.5 LBS

## 2019-09-20 DIAGNOSIS — Z94.0 STATUS POST KIDNEY TRANSPLANT: ICD-10-CM

## 2019-09-20 DIAGNOSIS — R05.9 COUGH: ICD-10-CM

## 2019-09-20 DIAGNOSIS — R42 LIGHT HEADED: ICD-10-CM

## 2019-09-20 DIAGNOSIS — J06.9 URI, ACUTE: ICD-10-CM

## 2019-09-20 DIAGNOSIS — R05.9 COUGH: Primary | ICD-10-CM

## 2019-09-20 DIAGNOSIS — R06.02 SHORTNESS OF BREATH: ICD-10-CM

## 2019-09-20 DIAGNOSIS — Z94.0 KIDNEY REPLACED BY TRANSPLANT: ICD-10-CM

## 2019-09-20 DIAGNOSIS — Z48.298 AFTERCARE FOLLOWING ORGAN TRANSPLANT: ICD-10-CM

## 2019-09-20 DIAGNOSIS — Z79.899 ENCOUNTER FOR LONG-TERM CURRENT USE OF MEDICATION: ICD-10-CM

## 2019-09-20 DIAGNOSIS — R52 ACHING: ICD-10-CM

## 2019-09-20 LAB
ANION GAP SERPL CALCULATED.3IONS-SCNC: 6 MMOL/L (ref 3–14)
BUN SERPL-MCNC: 11 MG/DL (ref 7–30)
CALCIUM SERPL-MCNC: 9 MG/DL (ref 8.5–10.1)
CHLORIDE SERPL-SCNC: 106 MMOL/L (ref 94–109)
CO2 SERPL-SCNC: 26 MMOL/L (ref 20–32)
CREAT SERPL-MCNC: 0.75 MG/DL (ref 0.66–1.25)
ERYTHROCYTE [DISTWIDTH] IN BLOOD BY AUTOMATED COUNT: 14.6 % (ref 10–15)
GFR SERPL CREATININE-BSD FRML MDRD: >90 ML/MIN/{1.73_M2}
GLUCOSE SERPL-MCNC: 97 MG/DL (ref 70–99)
HCT VFR BLD AUTO: 43.3 % (ref 40–53)
HGB BLD-MCNC: 13.9 G/DL (ref 13.3–17.7)
MCH RBC QN AUTO: 29 PG (ref 26.5–33)
MCHC RBC AUTO-ENTMCNC: 32.1 G/DL (ref 31.5–36.5)
MCV RBC AUTO: 90 FL (ref 78–100)
PLATELET # BLD AUTO: 345 10E9/L (ref 150–450)
POTASSIUM SERPL-SCNC: 3.4 MMOL/L (ref 3.4–5.3)
RBC # BLD AUTO: 4.8 10E12/L (ref 4.4–5.9)
SODIUM SERPL-SCNC: 139 MMOL/L (ref 133–144)
WBC # BLD AUTO: 11.9 10E9/L (ref 4–11)

## 2019-09-20 RX ORDER — AZITHROMYCIN 250 MG/1
TABLET, FILM COATED ORAL
Qty: 6 TABLET | Refills: 0 | Status: SHIPPED | OUTPATIENT
Start: 2019-09-20 | End: 2019-09-25

## 2019-09-20 ASSESSMENT — PAIN SCALES - GENERAL: PAINLEVEL: NO PAIN (0)

## 2019-09-20 ASSESSMENT — MIFFLIN-ST. JEOR: SCORE: 1600.1

## 2019-09-20 NOTE — NURSING NOTE
Chief Complaint   Patient presents with     URI     Pt complains of a uri for the past 12 days.         Pascual Tucker, EMT on 9/20/2019 at 11:04 AM

## 2019-09-20 NOTE — PROGRESS NOTES
"       HPI       Jerad Ross is a 59 year old man who presents he has a history of two renal transplants in 1978 and 1993. He continues to do well in . He follows up with nephrology right here at the [facility]. He presents today with upper respiratory symptoms. He has had a cough for the past 12 days. Despite taking otc medications and getting enough rest, he continues to have these symptoms and they are worsening.   Chief Complaint   Patient presents with     URI     Pt complains of a uri for the past 12 days.     Concern: cough/cold symptoms  Description of the problem :here today for exam and evaluation, cough is the worst symptom, also has some congestion and chest discomfort       Problem, Medication and Allergy Lists were reviewed and updated if needed.    Patient is an established patient of this clinic.         Review of Systems:   Review of Systems     Constitutional:  Positive for fatigue. Negative for fever and chills.   HENT:  Positive for sinus congestion.    Respiratory:   Positive for cough.                Physical Exam:     Vitals:    09/20/19 1104   BP: 116/81   BP Location: Right arm   Patient Position: Sitting   Cuff Size: Adult Regular   Pulse: 81   Resp: 16   Temp: 98.1  F (36.7  C)   TempSrc: Oral   SpO2: 93%   Weight: 80.1 kg (176 lb 8 oz)   Height: 1.727 m (5' 8\")     Body mass index is 26.84 kg/m .  Vitals were reviewed and were normal     Physical Exam  Constitutional:       Appearance: Normal appearance.   HENT:      Head: Normocephalic.      Right Ear: Hearing, tympanic membrane, ear canal and external ear normal.      Left Ear: Hearing, tympanic membrane, ear canal and external ear normal.      Nose: Nose normal.      Mouth/Throat:      Lips: Pink.      Mouth: Mucous membranes are moist.      Pharynx: Oropharynx is clear.   Eyes:      General: Lids are normal.   Cardiovascular:      Rate and Rhythm: Normal rate and regular rhythm.   Pulmonary:      Effort: Pulmonary effort is " normal.      Breath sounds: Examination of the right-lower field reveals decreased breath sounds. Examination of the left-lower field reveals decreased breath sounds. Decreased breath sounds present.   Musculoskeletal: Normal range of motion.   Skin:     General: Skin is warm and dry.   Neurological:      General: No focal deficit present.      Mental Status: He is alert and oriented to person, place, and time.   Psychiatric:         Mood and Affect: Mood normal.         Behavior: Behavior normal.         Results:     Chest x ray-normal, CBC shows elevated WBC, other completed labs normal.    Assessment and Plan     1. Cough    - XR Chest 2 Views; Future  - azithromycin (ZITHROMAX) 250 MG tablet; Take 2 tablets (500 mg) by mouth daily for 1 day, THEN 1 tablet (250 mg) daily for 4 days.  Dispense: 6 tablet; Refill: 0    2. Light headed      3. Shortness of breath      4. Aching      5. Status post kidney transplant    - Protein  random urine with Creat Ratio; Future  - CBC with platelets; Future  - Basic metabolic panel; Future    6. URI, acute    - azithromycin (ZITHROMAX) 250 MG tablet; Take 2 tablets (500 mg) by mouth daily for 1 day, THEN 1 tablet (250 mg) daily for 4 days.  Dispense: 6 tablet; Refill: 0      Medications Discontinued During This Encounter   Medication Reason     CHOLECALCIFEROL PO      docusate sodium (COLACE) 100 MG capsule      heparin lock flush 10 UNIT/ML SOLN injection      oxyCODONE IR (ROXICODONE) 5 MG tablet      polyethylene glycol (MIRALAX/GLYCOLAX) Packet      Chest x ray is normal, labs show an increased WBC at 11.9. SpO2 93%. Given his history and presenting symptoms,  I am going to treat him with oral Zithromax. He will return for follow up as needed for worsening or persisting symptoms.  Options for treatment and follow-up care were reviewed with the patient. Jerad Ross  engaged in the decision making process and verbalized understanding of the options discussed and agreed  with the final plan.  RONALDO Winston, JADON  Addendum:   RONALDO Winston, CNP

## 2019-09-20 NOTE — PATIENT INSTRUCTIONS

## 2019-09-21 ASSESSMENT — ASTHMA QUESTIONNAIRES: ACT_TOTALSCORE: 25

## 2019-09-23 ASSESSMENT — ENCOUNTER SYMPTOMS
CHILLS: 0
FEVER: 0
FATIGUE: 1
COUGH: 1
SINUS CONGESTION: 1

## 2019-09-27 ENCOUNTER — TELEPHONE (OUTPATIENT)
Dept: FAMILY MEDICINE | Facility: CLINIC | Age: 59
End: 2019-09-27

## 2019-09-27 ENCOUNTER — OFFICE VISIT (OUTPATIENT)
Dept: FAMILY MEDICINE | Facility: CLINIC | Age: 59
End: 2019-09-27
Payer: MEDICARE

## 2019-09-27 VITALS
SYSTOLIC BLOOD PRESSURE: 119 MMHG | DIASTOLIC BLOOD PRESSURE: 84 MMHG | HEART RATE: 80 BPM | BODY MASS INDEX: 26.72 KG/M2 | WEIGHT: 175.7 LBS | OXYGEN SATURATION: 96 %

## 2019-09-27 DIAGNOSIS — Z94.0 STATUS POST KIDNEY TRANSPLANT: ICD-10-CM

## 2019-09-27 DIAGNOSIS — D72.828 OTHER ELEVATED WHITE BLOOD CELL (WBC) COUNT: ICD-10-CM

## 2019-09-27 DIAGNOSIS — Z48.298 AFTERCARE FOLLOWING ORGAN TRANSPLANT: ICD-10-CM

## 2019-09-27 DIAGNOSIS — Z79.899 ENCOUNTER FOR LONG-TERM CURRENT USE OF MEDICATION: ICD-10-CM

## 2019-09-27 DIAGNOSIS — R06.2 WHEEZE: ICD-10-CM

## 2019-09-27 DIAGNOSIS — Z94.0 KIDNEY REPLACED BY TRANSPLANT: ICD-10-CM

## 2019-09-27 DIAGNOSIS — R05.9 COUGH: Primary | ICD-10-CM

## 2019-09-27 LAB — WBC # BLD AUTO: 11.2 10E9/L (ref 4–11)

## 2019-09-27 ASSESSMENT — ENCOUNTER SYMPTOMS
FEVER: 0
COUGH: 1
FATIGUE: 0
CHILLS: 0
SHORTNESS OF BREATH: 0

## 2019-09-27 ASSESSMENT — PAIN SCALES - GENERAL: PAINLEVEL: MODERATE PAIN (4)

## 2019-09-27 NOTE — NURSING NOTE
59 year old  Chief Complaint   Patient presents with     Cough     Pt is here for a follow up on cough and green mucus. Mucus symptoms are better, cough is still present.        Blood pressure 119/84, pulse 80, weight 79.7 kg (175 lb 11.2 oz), SpO2 96 %. Body mass index is 26.72 kg/m .  BP completed using cuff size:      Ida Paula MA  September 27, 2019 11:17 AM

## 2019-09-27 NOTE — PROGRESS NOTES
HPI       Jerad Ross is a 59 year old man who presents today for follow up on his URI. He has finished his Z pack and continues to cough. He does feel about 50% better, however he is not totally better and wants to be at 100%. He has taken Advair in the past and wonders if this may be helpful to him now. Jerad is here for exam and evaluation.   Chief Complaint   Patient presents with     Cough     Pt is here for a follow up on cough and green mucus. Mucus symptoms are better, cough is still present.      Problem, Medication and Allergy Lists were reviewed and updated if needed.    Patient is an established patient of this clinic.         Review of Systems:   Review of Systems     Constitutional:  Negative for fever, chills and fatigue.   Respiratory:   Positive for cough. Negative for shortness of breath.    Cardiovascular:  Negative for chest pain.               Physical Exam:     Vitals:    09/27/19 1110   BP: 119/84   Pulse: 80   SpO2: 96%   Weight: 79.7 kg (175 lb 11.2 oz)     Body mass index is 26.72 kg/m .  Vitals were reviewed and were normal     Physical Exam  Constitutional:       Appearance: Normal appearance.   HENT:      Head: Normocephalic.   Cardiovascular:      Rate and Rhythm: Normal rate and regular rhythm.      Pulses: Normal pulses.      Heart sounds: Normal heart sounds.   Pulmonary:      Effort: Pulmonary effort is normal.      Breath sounds: Normal breath sounds.   Skin:     General: Skin is warm and dry.   Neurological:      General: No focal deficit present.      Mental Status: He is alert and oriented to person, place, and time.   Psychiatric:         Mood and Affect: Mood normal.         Behavior: Behavior normal.         Results:     WBC count pending    Assessment and Plan     1. Cough    - fluticasone-salmeterol (ADVAIR) 100-50 MCG/DOSE inhaler; Inhale 1 puff into the lungs every 12 hours  Dispense: 1 Inhaler; Refill: 0    2. Wheeze    - fluticasone-salmeterol (ADVAIR)  100-50 MCG/DOSE inhaler; Inhale 1 puff into the lungs every 12 hours  Dispense: 1 Inhaler; Refill: 0    3. Other elevated white blood cell (WBC) count    - WBC count      There are no discontinued medications.  VSS. Breath sounds are clear bilaterally.First, have a WBC count today. I will notify you of the results. Next, your going to initiate Advair and take this 2 hours away from the Pulmicort. Next, please continue drinking water with meals and in between. Next, please call me if you have any concerns, worsening or persisting symptoms. Thank you. Options for treatment and follow-up care were reviewed with the patient. Jerad Ross  engaged in the decision making process and verbalized understanding of the options discussed and agreed with the final plan.  Viridiana Menendez, RONALDO, CNP

## 2019-09-27 NOTE — TELEPHONE ENCOUNTER
Left voicemail for pt to call back to clinic to discuss lab results.     Ida Paula MA 3:22 PM  9/27/2019

## 2019-09-27 NOTE — PATIENT INSTRUCTIONS
First, have a WBC count today. Next, your going to initiate Advair and take this 2 hours away from the Pulmicort. Next, please continue drinking water with meals and in between. Next, please call me if you have any concerns, worsening or persisting symptoms. Thank you.   [facility] Medication Refill Request Information:  * Please contact your pharmacy regarding ANY request for medication refills.  ** NP Clinic Prescription Fax = [phone]  * Please allow 3 business days for routine medication refills.  * Please allow 5 business days for controlled substance medication refills.     [facility] Test Result notification information:  *You will be notified with in 7-10 days of your appointment day regarding the results of your test.  If you are on MyChart you will be notified as soon as the provider has reviewed the results and signed off on them.    [facility]: [phone]

## 2019-09-30 ENCOUNTER — TELEPHONE (OUTPATIENT)
Dept: FAMILY MEDICINE | Facility: CLINIC | Age: 59
End: 2019-09-30

## 2019-09-30 NOTE — TELEPHONE ENCOUNTER
Called pt to follow up on cough and eye issues. Symptoms are getting better.     Ida Paula MA 8:29 AM  9/30/2019

## 2019-10-01 ENCOUNTER — TELEPHONE (OUTPATIENT)
Dept: TRANSPLANT | Facility: CLINIC | Age: 59
End: 2019-10-01

## 2019-10-01 ENCOUNTER — HEALTH MAINTENANCE LETTER (OUTPATIENT)
Age: 59
End: 2019-10-01

## 2019-10-01 DIAGNOSIS — Z94.0 KIDNEY REPLACED BY TRANSPLANT: ICD-10-CM

## 2019-10-01 DIAGNOSIS — Z48.298 AFTERCARE FOLLOWING ORGAN TRANSPLANT: ICD-10-CM

## 2019-10-01 DIAGNOSIS — Z94.0 STATUS POST KIDNEY TRANSPLANT: ICD-10-CM

## 2019-10-01 LAB
CREAT UR-MCNC: 158 MG/DL
PROT UR-MCNC: 0.4 G/L
PROT/CREAT 24H UR: 0.26 G/G CR (ref 0–0.2)

## 2019-10-01 NOTE — TELEPHONE ENCOUNTER
Provider Call: Transplant Lab/Orders  Route to LPN  Post Transplant Days: [id number]  When patient is less than 60 days post-transplant, route high priority  Reason for Call: Missing labs/orders; which labs/orders? Pt is taking mycophenlate and not cyckisorine germán fix orders and send to [facility] (If patient is at a clinic without orders, Lyndilma then page LPN)  Liver patients reporting abnormal lab results: Route to RN and Page  Document lab facility information when provider is calling about annual lab orders. Delete facility wildcards when not needed.    Callback needed? Yes    Return Call Needed  Same as documented in contacts section  When to return call?: Greater than one day: Route standard priority

## 2019-10-02 ENCOUNTER — OFFICE VISIT (OUTPATIENT)
Dept: PSYCHIATRY | Facility: CLINIC | Age: 59
End: 2019-10-02
Attending: NURSE PRACTITIONER
Payer: MEDICARE

## 2019-10-02 VITALS
SYSTOLIC BLOOD PRESSURE: 118 MMHG | BODY MASS INDEX: 26.3 KG/M2 | WEIGHT: 173 LBS | HEART RATE: 69 BPM | DIASTOLIC BLOOD PRESSURE: 80 MMHG

## 2019-10-02 DIAGNOSIS — F33.41 RECURRENT MAJOR DEPRESSIVE DISORDER, IN PARTIAL REMISSION (H): ICD-10-CM

## 2019-10-02 PROCEDURE — G0463 HOSPITAL OUTPT CLINIC VISIT: HCPCS | Mod: ZF

## 2019-10-02 RX ORDER — FLUOXETINE 40 MG/1
40 CAPSULE ORAL DAILY
Qty: 90 CAPSULE | Refills: 0 | Status: SHIPPED | OUTPATIENT
Start: 2019-10-02 | End: 2020-01-09

## 2019-10-02 RX ORDER — FLUOXETINE 10 MG/1
10 CAPSULE ORAL DAILY
Qty: 90 CAPSULE | Refills: 0 | Status: SHIPPED | OUTPATIENT
Start: 2019-10-02 | End: 2020-01-09

## 2019-10-02 ASSESSMENT — PAIN SCALES - GENERAL: PAINLEVEL: MILD PAIN (2)
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
## 2019-10-02 NOTE — PATIENT INSTRUCTIONS
Thank you for coming to the PSYCHIATRY CLINIC.    Lab Testing:  If you had lab testing today and your results are reassuring or normal they will be mailed to you or sent through [facility] within 7 days.   If the lab tests need quick action we will call you with the results.  The phone number we will call with results is # [phone] (home) . If this is not the best number please call our clinic and change the number.    Medication Refills:  If you need any refills please call your pharmacy and they will contact us. Our fax number for refills is [phone]. Please allow three business for refill processing.   If you need to  your refill at a new pharmacy, please contact the new pharmacy directly. The new pharmacy will help you get your medications transferred.     Scheduling:  If you have any concerns about today's visit or wish to schedule another appointment please call our office during normal business hours [phone] (8-5:00 M-F)    Contact Us:  Please call [phone] during business hours (8-5:00 M-F).  If after clinic hours, or on the weekend, please call  [phone].    Financial Assistance [phone]  [facility] Billing [phone]  Central Billing Office, [facility]: [phone]  [facility] Billing [phone]  Medical Records [phone]      MENTAL HEALTH CRISIS NUMBERS:  [facility]:   [facility] - [phone]   Crisis Residence [facility] - [phone]   Walk-In Counseling [facility] [phone]   [facility] [place] Mobile Team for Adults - [[phone]]; Child - [[phone]]        [facility]:   [facility] - [phone]   Walk-in counseling [facility] - [phone]   Walk-in counseling [facility] - [phone]   Crisis Residence [facility] - [phone]   Urgent Care Adult Mental Health:   --Drop-in, 24/7 crisis line, and [place] Mobile Team  [[phone]]    CRISIS TEXT LINE: Text [phone] from anywhere, anytime, any crisis 24/7;    OR SEE [email or web address].org     Poison Control Center - [phone]    CHILD: [facility] needs assessment team - [phone]     [facility] - [phone]; or [facility] Lifeline - [phone]    If you have a medical emergency please call 911or go to the nearest ER.                    _____________________________________________    Again thank you for choosing PSYCHIATRY CLINIC and please let us know how we can best partner with you to improve you and your family's health.  You may be receiving a survey in the mail regarding this appointment. We would love to have your feedback, both positive and negative, so please fill out the survey and return it using the provided envelope. The survey is done by an external company, so your answers are anonymous.

## 2019-10-03 ASSESSMENT — PATIENT HEALTH QUESTIONNAIRE - PHQ9: SUM OF ALL RESPONSES TO PHQ QUESTIONS 1-9: 12

## 2019-10-07 NOTE — PROGRESS NOTES
CHRONIC TRANSPLANT NEPHROLOGY VISIT    Assessment & Plan   # DDKT: Stable   - Baseline Cr ~ 0.8-1.1   - Proteinuria: Minimal (0.2-0.5 grams)   - Date DSA Last Checked: Not Known       Latest DSA: Not checked recently due to time from transplant   - BK Viremia: Not checked recently   - Kidney Tx Biopsy: No      # Immunosuppression: Mycophenolate mofetil (goal 1-3.5) and Prednisone (dose 5 mg daily)   - Changes: No      # Hypertension: Controlled; Goal BP: < 130/80   - Changes: No    # Anemia in Chronic Renal Disease: Hgb: Stable        - Iron studies: Not checked recently    # Mineral Bone Disorder:   - Secondary renal hyperparathyroidism; PTH level: Not checked recently  - Vitamin D; level: Not checked recently, but was normal last check Will start on supplement  - Calcium; level: Normal   - Phosphorus; level: Not checked recently, but was normal last check       # Electrolytes:   - Potassium; level: Normal  - Magnesium; level: Not checked recently  - Bicarbonate; level: Normal  - Sodium; level: Normal      # CAD: Patient has been experiencing shortness of breath.   - Will follow with cardiology to have an EKG and stress test preformed.     # Kidney Ultrasound: The native kidneys of the patient have not been examined recently. I advised the patient have an ultrasound of his kidneys done to monitor their health.     # Skin Cancer Risk:    - Discussed sun protection and recommend regular follow up with Dermatology.    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    Return visit: Return in about 1 year (around 10/8/2020).      Chief Complaint   Mr. Ross is a 59 year old here for routine follow up.    History of Present Illness   Mr. 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 He was last seen by Dr. Lovett on 12/11/2018, see note for further details.    The patient is feeling good overall. He reports that he has been experiencing shortness of breath and tightness in his chest with exertion. In addition, he reports that he has not seen a cardiologist recently about his heart health. He also reports that he has a cold today. He reports that colds that he experiences tend to last weeks to months.        Recent Hospitalizations:  [x] No [] Yes    New Medical Issues: [x] No [] Yes    Decreased energy: [x] No [] Yes    Chest pain or SOB with exertion:  [] No [x] Yes    Appetite change or weight change: [x] No [] Yes    Nausea, vomiting or diarrhea:  [x] No [] Yes    Fever, sweats or chills: [x] No [] Yes    Leg swelling: [x] No [] Yes      Home BP: Not checked    Review of Systems   A comprehensive review of systems was obtained and negative, except as noted in the HPI or PMH.    Problem List   Patient Active Problem List   Diagnosis     BCC (basal cell carcinoma), trunk     JULIANE (obstructive sleep apnea)     HTN, kidney transplant related     Coronary artery disease involving native coronary artery of native heart without angina pectoris     NSTEMI (non-ST elevated myocardial infarction) (H)     Depression     Diverticulosis     Hemorrhoids     Kidney replaced by transplant     Basal cell carcinoma     Squamous cell carcinoma     Dyslipidemia     CRP elevated     Glaucoma     AION (acute ischemic optic neuropathy)     Paracentral scotoma     Hip pain     Inflamed seborrheic keratosis     Intertrigo     Chronic hepatitis B (H)     Immunosuppression (H)     Hypogonadism in male     GERD (gastroesophageal  reflux disease)     Aftercare following organ transplant     Acute midline low back pain without sciatica     Septic bursitis     Impaired mobility     Infection of lumbar spine (H)       Social History   Social History     Tobacco Use     Smoking status: Former Smoker     Packs/day: 1.00     Years: 9.00     Pack years: 9.00     Last attempt to quit: 1988     Years since quittin.7     Smokeless tobacco: Former User   Substance Use Topics     Alcohol use: Yes     Alcohol/week: 0.0 standard drinks     Comment: rarely 1 drink per month     Drug use: No       Allergies   Allergies   Allergen Reactions     Penicillins Shortness Of Breath and Hives     Keflex [Cephalexin Hcl] Other (See Comments)     Pt could not recall reaction     Tetracycline Other (See Comments)     Patient could not recall reaction     Sulfa Drugs Rash       Medications   Current Outpatient Medications   Medication Sig     acetaminophen (TYLENOL) 325 MG tablet Take 1-2 tablets by mouth every 6 hours as needed.     albuterol (PROAIR HFA) 108 (90 Base) MCG/ACT Inhaler Inhale 2 puffs into the lungs every 6 hours as needed for shortness of breath / dyspnea or wheezing     amLODIPine (NORVASC) 5 MG tablet Take 1 tablet (5 mg) by mouth daily     atorvastatin (LIPITOR) 20 MG tablet Take 1 tablet (20 mg) by mouth daily     budesonide (PULMICORT FLEXHALER) 90 MCG/ACT inhaler Inhale 2 puffs into the lungs 2 times daily     clopidogrel (PLAVIX) 75 MG tablet Take 1 tablet (75 mg) by mouth daily     entecavir (BARACLUDE) 0.5 MG tablet Take 1 tablet (0.5 mg) by mouth daily     FLUoxetine (PROZAC) 10 MG capsule Take 1 capsule (10 mg) by mouth daily With 40mg daily for a total daily dose of 50mg     FLUoxetine (PROZAC) 40 MG capsule Take 1 capsule (40 mg) by mouth daily     isosorbide mononitrate (IMDUR) 30 MG 24 hr tablet Take 0.5 tablets (15 mg) by mouth daily Need appointment for further refills     latanoprost (XALATAN) 0.005 % ophthalmic solution Place  1 drop into both eyes At Bedtime Please keep 10-9-19 clinic appt with Dr. Rizzo, for further refills     mycophenolate (GENERIC EQUIVALENT) 250 MG capsule Take 3 capsules (750 mg) by mouth 2 times daily     pantoprazole (PROTONIX) 40 MG EC tablet Take 1 tablet (40 mg) by mouth daily     predniSONE (DELTASONE) 5 MG tablet Take 5 mg by mouth daily.     aspirin 81 MG tablet Take 81 mg by mouth daily      BETA BLOCKER NOT PRESCRIBED, INTENTIONAL, Beta Blocker not prescribed intentionally due to Bradycardia < 50 bpm without beta blocker therapy (Patient not taking: Reported on 10/8/2019)     calcium citrate-vitamin D (CITRACAL) 315-200 MG-UNIT TABS Take 1 tablet by mouth daily.     fluticasone (FLONASE) 50 MCG/ACT spray Spray 1-2 sprays into both nostrils daily (Patient not taking: Reported on 10/8/2019)     fluticasone-salmeterol (ADVAIR) 100-50 MCG/DOSE inhaler Inhale 1 puff into the lungs every 12 hours (Patient not taking: Reported on 10/8/2019)     fluticasone-salmeterol (ADVAIR) 250-50 MCG/DOSE diskus inhaler Inhale 1 puff into the lungs every 12 hours (Patient not taking: Reported on 10/8/2019)     Multiple Vitamins-Iron (ONE DAILY MULTIVITAMIN/IRON) TABS Take 1 tablet by mouth daily     Omega-3 Fatty Acids (OMEGA 3 PO) Take 1 capsule by mouth daily Dose unknown     Current Facility-Administered Medications   Medication     lidocaine 1% with EPINEPHrine 1:100,000 injection 3 mL     lidocaine 1% with EPINEPHrine 1:100,000 injection 3 mL     There are no discontinued medications.    Physical Exam   Vital Signs: /80   Pulse 64   Temp 98.2  F (36.8  C)   Wt 78.7 kg (173 lb 8 oz)   SpO2 94%   BMI 26.38 kg/m      GENERAL APPEARANCE: alert and no distress  HENT: mouth without ulcers or lesions  LYMPHATICS: no cervical or supraclavicular nodes  RESP: lungs clear to auscultation - no rales, rhonchi or wheezes  CV: regular rhythm, normal rate, no rub, no murmur  EDEMA: no LE edema bilaterally  ABDOMEN: soft,  nondistended, nontender, bowel sounds normal  MS: extremities normal - no gross deformities noted, no evidence of inflammation in joints, no muscle tenderness  SKIN: no rash  TX KIDNEY: normal  DIALYSIS ACCESS:  None      Data     Renal Latest Ref Rng & Units 9/20/2019 4/2/2019 11/23/2018   Na 133 - 144 mmol/L 139 134 137   K 3.4 - 5.3 mmol/L 3.4 3.8 3.3(L)   Cl 94 - 109 mmol/L 106 102 103   CO2 20 - 32 mmol/L 26 26 27   BUN 7 - 30 mg/dL 11 12 12   Cr 0.66 - 1.25 mg/dL 0.75 0.74 0.77   Glucose 70 - 99 mg/dL 97 80 101(H)   Ca  8.5 - 10.1 mg/dL 9.0 9.6 8.8   Mg 1.6 - 2.3 mg/dL - - -     Bone Health Latest Ref Rng & Units 11/5/2018 11/7/2017 10/3/2014   Phos 2.5 - 4.5 mg/dL 2.9 - 1.9(L)   PTHi 12 - 72 pg/mL - - -   Vit D Def 20 - 75 ug/L - 28 -     Heme Latest Ref Rng & Units 9/27/2019 9/20/2019 4/2/2019   WBC 4.0 - 11.0 10e9/L 11.2(H) 11.9(H) 8.2   Hgb 13.3 - 17.7 g/dL - 13.9 13.5   Plt 150 - 450 10e9/L - 345 332     Liver Latest Ref Rng & Units 11/23/2018 10/23/2018 3/13/2018   AP 40 - 150 U/L 106 87 77   TBili 0.2 - 1.3 mg/dL 0.4 0.8 0.5   DBili 0.0 - 0.2 mg/dL - 0.2 0.1   ALT 0 - 70 U/L 27 22 27   AST 0 - 45 U/L 25 20 24   Tot Protein 6.8 - 8.8 g/dL 7.0 7.4 6.8   Albumin 3.4 - 5.0 g/dL 3.1(L) 3.5 3.2(L)     Pancreas Latest Ref Rng & Units 2/12/2012   Lipase 20 - 250 U/L 94     Iron studies Latest Ref Rng & Units 1/8/2009   Ferritin 20 - 300 ng/mL 76     [facility] Txp Virology Latest Ref Rng & Units 9/12/2017 2/14/2012 1/5/2011   CVM DNA Quant - Plasma, EDTA anticoagulant Whole blood, EDTA anticoagulant -   CMV Quant <100 Copies/mL - <100  No CMV DNA detected. -   CMV QT Log <2.0 Log copies/mL - <2.0  The Cytomegalovirus DNA Quantitation assay is a real-time polymerase chain   reaction (PCR) utilizing analyte specific reagents manufactured by [employer]   [facility]. Analyte Specific Reagents (ASRs) are used in many laboratory   tests necessary for standard medical care and generally do not require FDA   approval.   This  test was developed and its performance characteristics determined by   [facility].  It has not been   cleared or approved by the US Food and Drug Administration. -   Hep B Core NEG - - Positive(A)   Hep B Surf - - - 0.0            Recent Labs   Lab Test 09/12/17  0923 11/06/18  0647   DOSMPA 9pm 09.11.2017 Not Provided   MPACID 3.82* 0.55*   MPAG 69.3 29.5*     Scribe Disclosure:  IErna, am serving as a scribe to document services personally performed by Dr. Henry at this visit, based upon the provider's statements to me. All documentation has been reviewed by the aforementioned provider prior to being entered into the official medical record.     Erna GUEVARA, a scribe, prepared the chart for today's encounter.

## 2019-10-08 ENCOUNTER — OFFICE VISIT (OUTPATIENT)
Dept: DERMATOLOGY | Facility: CLINIC | Age: 59
End: 2019-10-08
Payer: MEDICARE

## 2019-10-08 ENCOUNTER — OFFICE VISIT (OUTPATIENT)
Dept: NEPHROLOGY | Facility: CLINIC | Age: 59
End: 2019-10-08
Attending: INTERNAL MEDICINE
Payer: MEDICARE

## 2019-10-08 VITALS
BODY MASS INDEX: 26.38 KG/M2 | DIASTOLIC BLOOD PRESSURE: 80 MMHG | HEART RATE: 64 BPM | TEMPERATURE: 98.2 F | OXYGEN SATURATION: 94 % | WEIGHT: 173.5 LBS | SYSTOLIC BLOOD PRESSURE: 116 MMHG

## 2019-10-08 DIAGNOSIS — R06.09 DYSPNEA ON EXERTION: ICD-10-CM

## 2019-10-08 DIAGNOSIS — L91.8 ST (SKIN TAG): Primary | ICD-10-CM

## 2019-10-08 DIAGNOSIS — L30.4 INTERTRIGO: ICD-10-CM

## 2019-10-08 DIAGNOSIS — Z23 ENCOUNTER FOR VACCINATION: Primary | ICD-10-CM

## 2019-10-08 DIAGNOSIS — D48.5 NEOPLASM OF UNCERTAIN BEHAVIOR OF SKIN: ICD-10-CM

## 2019-10-08 DIAGNOSIS — L82.0 INFLAMED SEBORRHEIC KERATOSIS: ICD-10-CM

## 2019-10-08 DIAGNOSIS — Z48.298 AFTERCARE FOLLOWING ORGAN TRANSPLANT: ICD-10-CM

## 2019-10-08 DIAGNOSIS — L57.0 AK (ACTINIC KERATOSIS): ICD-10-CM

## 2019-10-08 PROCEDURE — 25000128 H RX IP 250 OP 636: Mod: ZF | Performed by: INTERNAL MEDICINE

## 2019-10-08 PROCEDURE — 88342 IMHCHEM/IMCYTCHM 1ST ANTB: CPT | Mod: TC | Performed by: DERMATOLOGY

## 2019-10-08 PROCEDURE — 90682 RIV4 VACC RECOMBINANT DNA IM: CPT | Mod: ZF | Performed by: INTERNAL MEDICINE

## 2019-10-08 PROCEDURE — G0463 HOSPITAL OUTPT CLINIC VISIT: HCPCS | Mod: 25,ZF

## 2019-10-08 PROCEDURE — 88341 IMHCHEM/IMCYTCHM EA ADD ANTB: CPT | Mod: TC | Performed by: DERMATOLOGY

## 2019-10-08 PROCEDURE — G0463 HOSPITAL OUTPT CLINIC VISIT: HCPCS | Mod: 25,27

## 2019-10-08 PROCEDURE — G0008 ADMIN INFLUENZA VIRUS VAC: HCPCS | Mod: ZF

## 2019-10-08 PROCEDURE — 88305 TISSUE EXAM BY PATHOLOGIST: CPT | Mod: TC | Performed by: DERMATOLOGY

## 2019-10-08 RX ORDER — LIDOCAINE HYDROCHLORIDE AND EPINEPHRINE 10; 10 MG/ML; UG/ML
3 INJECTION, SOLUTION INFILTRATION; PERINEURAL ONCE
Status: DISCONTINUED | OUTPATIENT
Start: 2019-10-08 | End: 2021-07-26

## 2019-10-08 RX ADMIN — INFLUENZA A VIRUS A/BRISBANE/02/2018 (H1N1) RECOMBINANT HEMAGGLUTININ ANTIGEN, INFLUENZA A VIRUS A/KANSAS/14/2017 (H3N2) RECOMBINANT HEMAGGLUTININ ANTIGEN, INFLUENZA B VIRUS B/PHUKET/3073/2013 RECOMBINANT HEMAGGLUTININ ANTIGEN, AND INFLUENZA B VIRUS B/MARYLAND/15/2016 RECOMBINANT HEMAGGLUTININ ANTIGEN 0.5 ML: 45; 45; 45; 45 INJECTION INTRAMUSCULAR at 13:42

## 2019-10-08 ASSESSMENT — PAIN SCALES - GENERAL
PAINLEVEL: NO PAIN (0)

## 2019-10-08 NOTE — LETTER
10/8/2019    RE: Jerad Ross  [address]  formerly [facility] [id number]     CHRONIC TRANSPLANT NEPHROLOGY VISIT    Assessment & Plan   # DDKT: Stable   - Baseline Cr ~ 0.8-1.1   - Proteinuria: Minimal (0.2-0.5 grams)   - Date DSA Last Checked: Not Known       Latest DSA: Not checked recently due to time from transplant   - BK Viremia: Not checked recently   - Kidney Tx Biopsy: No    # Immunosuppression: Mycophenolate mofetil (goal 1-3.5) and Prednisone (dose 5 mg daily)   - Changes: No    # Hypertension: Controlled; Goal BP: < 130/80   - Changes: No    # Anemia in Chronic Renal Disease: Hgb: Stable        - Iron studies: Not checked recently    # Mineral Bone Disorder:   - Secondary renal hyperparathyroidism; PTH level: Not checked recently  - Vitamin D; level: Not checked recently, but was normal last check Will start on supplement  - Calcium; level: Normal   - Phosphorus; level: Not checked recently, but was normal last check     # Electrolytes:   - Potassium; level: Normal  - Magnesium; level: Not checked recently  - Bicarbonate; level: Normal  - Sodium; level: Normal      # CAD: Patient has been experiencing shortness of breath.   - Will follow with cardiology to have an EKG and stress test preformed.     # Kidney Ultrasound: The native kidneys of the patient have not been examined recently. I advised the patient have an ultrasound of his kidneys done to monitor their health.     # Skin Cancer Risk:    - Discussed sun protection and recommend regular follow up with Dermatology.    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    Return visit: Return in about 1 year (around 10/8/2020).    Chief Complaint   Mr. Ross is a 59 year old here for routine follow up.    History of Present Illness   Mr. 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 He was last seen by Dr. Lovett on 12/11/2018, see note for further details.    The patient is feeling good overall. He reports that he has been experiencing shortness of breath and tightness in his chest with exertion. In addition, he reports that he has not seen a cardiologist recently about his heart health. He also reports that he has a cold today. He reports that colds that he experiences tend to last weeks to months.        Recent Hospitalizations:  [x] No [] Yes    New Medical Issues: [x] No [] Yes    Decreased energy: [x] No [] Yes    Chest pain or SOB with exertion:  [] No [x] Yes    Appetite change or weight change: [x] No [] Yes    Nausea, vomiting or diarrhea:  [x] No [] Yes    Fever, sweats or chills: [x] No [] Yes    Leg swelling: [x] No [] Yes      Home BP: Not checked    Review of Systems   A comprehensive review of systems was obtained and negative, except as noted in the HPI or PMH.    Problem List   Patient Active Problem List   Diagnosis     BCC (basal cell carcinoma), trunk     JULIANE (obstructive sleep apnea)     HTN, kidney transplant related     Coronary artery disease involving native coronary artery of native heart without angina pectoris     NSTEMI (non-ST elevated myocardial infarction) (H)     Depression     Diverticulosis     Hemorrhoids     Kidney replaced by transplant     Basal cell carcinoma     Squamous cell carcinoma     Dyslipidemia     CRP elevated     Glaucoma     AION (acute ischemic optic neuropathy)     Paracentral scotoma     Hip pain     Inflamed seborrheic keratosis     Intertrigo     Chronic hepatitis B (H)      Immunosuppression (H)     Hypogonadism in male     GERD (gastroesophageal reflux disease)     Aftercare following organ transplant     Acute midline low back pain without sciatica     Septic bursitis     Impaired mobility     Infection of lumbar spine (H)       Social History   Social History     Tobacco Use     Smoking status: Former Smoker     Packs/day: 1.00     Years: 9.00     Pack years: 9.00     Last attempt to quit: 1988     Years since quittin.7     Smokeless tobacco: Former User   Substance Use Topics     Alcohol use: Yes     Alcohol/week: 0.0 standard drinks     Comment: rarely 1 drink per month     Drug use: No       Allergies   Allergies   Allergen Reactions     Penicillins Shortness Of Breath and Hives     Keflex [Cephalexin Hcl] Other (See Comments)     Pt could not recall reaction     Tetracycline Other (See Comments)     Patient could not recall reaction     Sulfa Drugs Rash       Medications   Current Outpatient Medications   Medication Sig     acetaminophen (TYLENOL) 325 MG tablet Take 1-2 tablets by mouth every 6 hours as needed.     albuterol (PROAIR HFA) 108 (90 Base) MCG/ACT Inhaler Inhale 2 puffs into the lungs every 6 hours as needed for shortness of breath / dyspnea or wheezing     amLODIPine (NORVASC) 5 MG tablet Take 1 tablet (5 mg) by mouth daily     atorvastatin (LIPITOR) 20 MG tablet Take 1 tablet (20 mg) by mouth daily     budesonide (PULMICORT FLEXHALER) 90 MCG/ACT inhaler Inhale 2 puffs into the lungs 2 times daily     clopidogrel (PLAVIX) 75 MG tablet Take 1 tablet (75 mg) by mouth daily     entecavir (BARACLUDE) 0.5 MG tablet Take 1 tablet (0.5 mg) by mouth daily     FLUoxetine (PROZAC) 10 MG capsule Take 1 capsule (10 mg) by mouth daily With 40mg daily for a total daily dose of 50mg     FLUoxetine (PROZAC) 40 MG capsule Take 1 capsule (40 mg) by mouth daily     isosorbide mononitrate (IMDUR) 30 MG 24 hr tablet Take 0.5 tablets (15 mg) by mouth daily Need appointment for  further refills     latanoprost (XALATAN) 0.005 % ophthalmic solution Place 1 drop into both eyes At Bedtime Please keep 10-9-19 clinic appt with Dr. Rizzo, for further refills     mycophenolate (GENERIC EQUIVALENT) 250 MG capsule Take 3 capsules (750 mg) by mouth 2 times daily     pantoprazole (PROTONIX) 40 MG EC tablet Take 1 tablet (40 mg) by mouth daily     predniSONE (DELTASONE) 5 MG tablet Take 5 mg by mouth daily.     aspirin 81 MG tablet Take 81 mg by mouth daily      BETA BLOCKER NOT PRESCRIBED, INTENTIONAL, Beta Blocker not prescribed intentionally due to Bradycardia < 50 bpm without beta blocker therapy (Patient not taking: Reported on 10/8/2019)     calcium citrate-vitamin D (CITRACAL) 315-200 MG-UNIT TABS Take 1 tablet by mouth daily.     fluticasone (FLONASE) 50 MCG/ACT spray Spray 1-2 sprays into both nostrils daily (Patient not taking: Reported on 10/8/2019)     fluticasone-salmeterol (ADVAIR) 100-50 MCG/DOSE inhaler Inhale 1 puff into the lungs every 12 hours (Patient not taking: Reported on 10/8/2019)     fluticasone-salmeterol (ADVAIR) 250-50 MCG/DOSE diskus inhaler Inhale 1 puff into the lungs every 12 hours (Patient not taking: Reported on 10/8/2019)     Multiple Vitamins-Iron (ONE DAILY MULTIVITAMIN/IRON) TABS Take 1 tablet by mouth daily     Omega-3 Fatty Acids (OMEGA 3 PO) Take 1 capsule by mouth daily Dose unknown     Current Facility-Administered Medications   Medication     lidocaine 1% with EPINEPHrine 1:100,000 injection 3 mL     lidocaine 1% with EPINEPHrine 1:100,000 injection 3 mL     There are no discontinued medications.    Physical Exam   Vital Signs: /80   Pulse 64   Temp 98.2  F (36.8  C)   Wt 78.7 kg (173 lb 8 oz)   SpO2 94%   BMI 26.38 kg/m       GENERAL APPEARANCE: alert and no distress  HENT: mouth without ulcers or lesions  LYMPHATICS: no cervical or supraclavicular nodes  RESP: lungs clear to auscultation - no rales, rhonchi or wheezes  CV: regular rhythm,  normal rate, no rub, no murmur  EDEMA: no LE edema bilaterally  ABDOMEN: soft, nondistended, nontender, bowel sounds normal  MS: extremities normal - no gross deformities noted, no evidence of inflammation in joints, no muscle tenderness  SKIN: no rash  TX KIDNEY: normal  DIALYSIS ACCESS:  None      Data     Renal Latest Ref Rng & Units 9/20/2019 4/2/2019 11/23/2018   Na 133 - 144 mmol/L 139 134 137   K 3.4 - 5.3 mmol/L 3.4 3.8 3.3(L)   Cl 94 - 109 mmol/L 106 102 103   CO2 20 - 32 mmol/L 26 26 27   BUN 7 - 30 mg/dL 11 12 12   Cr 0.66 - 1.25 mg/dL 0.75 0.74 0.77   Glucose 70 - 99 mg/dL 97 80 101(H)   Ca  8.5 - 10.1 mg/dL 9.0 9.6 8.8   Mg 1.6 - 2.3 mg/dL - - -     Bone Health Latest Ref Rng & Units 11/5/2018 11/7/2017 10/3/2014   Phos 2.5 - 4.5 mg/dL 2.9 - 1.9(L)   PTHi 12 - 72 pg/mL - - -   Vit D Def 20 - 75 ug/L - 28 -     Heme Latest Ref Rng & Units 9/27/2019 9/20/2019 4/2/2019   WBC 4.0 - 11.0 10e9/L 11.2(H) 11.9(H) 8.2   Hgb 13.3 - 17.7 g/dL - 13.9 13.5   Plt 150 - 450 10e9/L - 345 332     Liver Latest Ref Rng & Units 11/23/2018 10/23/2018 3/13/2018   AP 40 - 150 U/L 106 87 77   TBili 0.2 - 1.3 mg/dL 0.4 0.8 0.5   DBili 0.0 - 0.2 mg/dL - 0.2 0.1   ALT 0 - 70 U/L 27 22 27   AST 0 - 45 U/L 25 20 24   Tot Protein 6.8 - 8.8 g/dL 7.0 7.4 6.8   Albumin 3.4 - 5.0 g/dL 3.1(L) 3.5 3.2(L)     Pancreas Latest Ref Rng & Units 2/12/2012   Lipase 20 - 250 U/L 94     Iron studies Latest Ref Rng & Units 1/8/2009   Ferritin 20 - 300 ng/mL 76     [facility] Txp Virology Latest Ref Rng & Units 9/12/2017 2/14/2012 1/5/2011   CVM DNA Quant - Plasma, EDTA anticoagulant Whole blood, EDTA anticoagulant -   CMV Quant <100 Copies/mL - <100  No CMV DNA detected. -   CMV QT Log <2.0 Log copies/mL - <2.0  The Cytomegalovirus DNA Quantitation assay is a real-time polymerase chain   reaction (PCR) utilizing analyte specific reagents manufactured by [employer]   [facility]. Analyte Specific Reagents (ASRs) are used in many laboratory   tests necessary  for standard medical care and generally do not require FDA   approval.   This test was developed and its performance characteristics determined by   [facility].  It has not been   cleared or approved by the US Food and Drug Administration. -   Hep B Core NEG - - Positive(A)   Hep B Surf - - - 0.0          Recent Labs   Lab Test 09/12/17  0923 11/06/18  0647   DOSMPA 9pm 09.11.2017 Not Provided   MPACID 3.82* 0.55*   MPAG 69.3 29.5*     Scribe Disclosure:  IErna, am serving as a scribe to document services personally performed by Dr. Henry at this visit, based upon the provider's statements to me. All documentation has been reviewed by the aforementioned provider prior to being entered into the official medical record.     Erna GUEVARA, a scribe, prepared the chart for today's encounter.     Kidney/Pancreas Recipient

## 2019-10-08 NOTE — NURSING NOTE
Dermatology Rooming Note    Jerad Ross's goals for this visit include:   Chief Complaint   Patient presents with     Skin Check     Personal hx of BCC. Jerad notes that he has a few spots that need to be frozen.     B[place], CMA

## 2019-10-08 NOTE — PATIENT INSTRUCTIONS
For your chest use over the counter Zeasorb AF on your skin     Cryotherapy    What is it?    Use of a very cold liquid, such as liquid nitrogen, to freeze and destroy abnormal skin cells that need to be removed    What should I expect?    Tenderness and redness    A small blister that might grow and fill with dark purple blood. There may be crusting.    More than one treatment may be needed if the lesions do not go away.    How do I care for the treated area?    Gently wash the area with your hands when bathing.    Use a thin layer of Vaseline to help with healing. You may use a Band-Aid.     The area should heal within 7-10 days and may leave behind a pink or lighter color.     Do not use an antibiotic or Neosporin ointment.     You may take acetaminophen (Tylenol) for pain.     Call your Doctor if you have:    Severe pain    Signs of infection (warmth, redness, cloudy yellow drainage, and or a bad smell)    Questions or concerns    Who should I call with questions?       [facility]: [phone]       [facility]: [phone]       For urgent needs outside of business hours call the [facility] at [phone]        and ask for the dermatology resident on call  Wound Care After a Biopsy    What is a skin biopsy?  A skin biopsy allows the doctor to examine a very small piece of tissue under the microscope to determine the diagnosis and the best treatment for the skin condition. A local anesthetic (numbing medicine)  is injected with a very small needle into the skin area to be tested. A small piece of skin is taken from the area. Sometimes a suture (stitch) is used.     What are the risks of a skin biopsy?  I will experience scar, bleeding, swelling, pain, crusting and redness. I may experience incomplete removal or recurrence. Risks of this procedure are excessive bleeding, bruising, infection, nerve damage, numbness, thick (hypertrophic or  keloidal) scar and non-diagnostic biopsy.    How should I care for my wound for the first 24 hours?    Keep the wound dry and covered for 24 hours    If it bleeds, hold direct pressure on the area for 15 minutes. If bleeding does not stop then go to the emergency room    Avoid strenuous exercise the first 1-2 days or as your doctor instructs you    How should I care for the wound after 24 hours?    After 24 hours, remove the bandage    You may bathe or shower as normal    If you had a scalp biopsy, you can shampoo as usual and can use shower water to clean the biopsy site daily    Clean the wound twice a day with gentle soap and water    Do not scrub, be gentle    Apply white petroleum/Vaseline after cleaning the wound with a cotton swab or a clean finger, and keep the site covered with a Bandaid /bandage. Bandages are not necessary with a scalp biopsy    If you are unable to cover the site with a Bandaid /bandage, re-apply ointment 2-3 times a day to keep the site moist. Moisture will help with healing    Avoid strenuous activity for first 1-2 days    Avoid lakes, rivers, pools, and oceans until the stitches are removed or the site is healed    How do I clean my wound?    Wash hands thoroughly with soap or use hand  before all wound care    Clean the wound with gentle soap and water    Apply white petroleum/Vaseline  to wound after it is clean    Replace the Bandaid /bandage to keep the wound covered for the first few days or as instructed by your doctor    If you had a scalp biopsy, warm shower water to the area on a daily basis should suffice    What should I use to clean my wound?     Cotton-tipped applicators (Qtips )    White petroleum jelly (Vaseline ). Use a clean new container and use Q-tips to apply.    Bandaids   as needed    Gentle soap     How should I care for my wound long term?    Do not get your wound dirty    Keep up with wound care for one week or until the area is healed.    A small scab  will form and fall off by itself when the area is completely healed. The area will be red and will become pink in color as it heals. Sun protection is very important for how your scar will turn out. Sunscreen with an SPF 30 or greater is recommended once the area is healed.    You should have some soreness but it should be mild and slowly go away over several days. Talk to your doctor about using tylenol for pain,    When should I call my doctor?  If you have increased:     Pain or swelling    Pus or drainage (clear or slightly yellow drainage is ok)    Temperature over 100F    Spreading redness or warmth around wound    When will I hear about my results?  The biopsy results can take 2-3 weeks to come back. The clinic will call you with the results, send you a Nextt message, or have you schedule a follow-up clinic or phone time to discuss the results. Contact our clinics if you do not hear from us in 3 weeks.     Who should I call with questions?    [facility]: [phone]     [facility]: [phone]    For urgent needs outside of business hours call the [facility] at [phone] and ask for the dermatology resident on call

## 2019-10-08 NOTE — NURSING NOTE
"Chief Complaint   Patient presents with     RECHECK     FOllow up Kidney TX     Vital signs:  Temp: 98.2  F (36.8  C)   BP: 116/80 Pulse: 64     SpO2: 94 %       Weight: 78.7 kg (173 lb 8 oz)  Estimated body mass index is 26.38 kg/m  as calculated from the following:    Height as of 9/20/19: 1.727 m (5' 8\").    Weight as of this encounter: 78.7 kg (173 lb 8 oz).        Veronica Moser, CMA    "

## 2019-10-08 NOTE — PROGRESS NOTES
[facility] Dermatology Note      Dermatology Problem List:  1.History of kidney transplant 10/6/1993  - Immunosuppression: prednisone 5mg, mycophenolate (previously on cyclosporine, imuran)  2. History of BCC - mid chest x 2, right axilla x 1 s/p excision   3. Inflamed seborrheic and verrucous keratoses s/p cryo  4. Intertrigo   - Zeasorb AF  - hydrocortisone 2.5% ointment  5. Filiform warts   - left lateral eyelid, left alteral canthus, left temple s/p cryo 3/15/17  6. Acrochordons   - L anterior neck s/p cryo 10/8/19  7. L thigh epidermal inclusion cyst     Encounter Date: Oct 8, 2019    CC:  Chief Complaint   Patient presents with     Skin Check     Personal hx of BCC. Jerad notes that he has a few spots that need to be frozen.     History of Present Illness:  Mr. Jerad Ross is a 59 year old male who presents for a transplant skin check. He was last seen on 12/6/2018 when lesions on L neck and L lateral forearm were biopsied which were hypertrophic actinic keratoses and verrucous keratoses. We also performed cryotherapy on 2 filiform warts and multiple irritated SKs. Today, the patient complains of some skin tags on his anterior neck that bother him and get caught on his clothes. Otherwise he has no other areas of concern. No changing lesions, abnormal bleeding, itching or painful changes, or other changes in his skin.     Past Medical History:   Patient Active Problem List   Diagnosis     BCC (basal cell carcinoma), trunk     JULIANE (obstructive sleep apnea)     HTN, kidney transplant related     Coronary artery disease involving native coronary artery of native heart without angina pectoris     NSTEMI (non-ST elevated myocardial infarction) (H)     Depression     Diverticulosis     Hemorrhoids     Kidney replaced by transplant     Basal cell carcinoma     Squamous cell carcinoma     Dyslipidemia     CRP elevated     Glaucoma     AION (acute ischemic optic neuropathy)     Paracentral  scotoma     Hip pain     Inflamed seborrheic keratosis     Intertrigo     Chronic hepatitis B (H)     Immunosuppression (H)     Hypogonadism in male     GERD (gastroesophageal reflux disease)     Aftercare following organ transplant     Acute midline low back pain without sciatica     Septic bursitis     Impaired mobility     Infection of lumbar spine (H)     Past Medical History:   Diagnosis Date     AION (acute ischemic optic neuropathy)      Anemia in chronic renal disease      Avascular necrosis of bones of both hips (H)     s/p bilateral hip replacements     Basal cell carcinoma      Chronic hepatitis B (H)      Clostridium difficile colitis      Coronary artery disease involving native coronary artery of native heart without angina pectoris 6/17/2014    Coronary angiogram 6/17/14: Severe distal 3-vessel disease involving small vessels, not amenable to PCI or CABG.     CRP elevated      Depression      Diverticulosis      Dyslipidemia      FSGS (focal segmental glomerulosclerosis)      Gastric ulcer with hemorrhage 2/12/12     GERD (gastroesophageal reflux disease)      Glaucoma     OHTN     Hemorrhoids      Hypertension secondary to other renal disorders      Hypogonadism in male      Immunosuppressed status (H)      Kidney replaced by transplant     focal glomerulosclerosis      NSTEMI (non-ST elevated myocardial infarction) (H) 6/17/2014     JULIANE (obstructive sleep apnea)     Doesn't use CPAP     Paracentral scotoma     LE     Secondary renal hyperparathyroidism (H)      Squamous cell carcinoma      Past Surgical History:   Procedure Laterality Date     APPENDECTOMY       CATARACT IOL, RT/LT  4/19/2000    RE     CATARACT IOL, RT/LT  6/1/2000    LE     COLECTOMY SUBTOTAL  1983    10 cm, diverticulitis     COLONOSCOPY  2/13/2012    Procedure:COLONOSCOPY; Surgeon:SLOAN GALLARDO; Location: GI     ESOPHAGOSCOPY, GASTROSCOPY, DUODENOSCOPY (EGD), COMBINED  2/13/2012    Procedure:COMBINED  ESOPHAGOSCOPY, GASTROSCOPY, DUODENOSCOPY (EGD); Surgeon:SLOAN GALLARDO; Location:[facility]     EXTRACAPSULAR CATARACT EXTRATION WITH INTRAOCULAR LENS IMPLANT  4-20-10, 6-1-10    Rt, Lt     HERNIA REPAIR  1995    Lt inguinal     HIP SURGERY      1981, bilat MITZI, revised 2001, 2005     KIDNEY SURGERY  1978 and 1993    transplant     KNEE SURGERY  1983, 1987    bilat TKA     MOHS MICROGRAPHIC PROCEDURE       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 He has a 9.00 pack-year smoking history. He has quit using smokeless tobacco. He reports current alcohol use. He reports that he does not use drugs.    Family History:  Family History   Problem Relation Age of Onset     Cardiovascular Father         AI with valve repair     Hypertension Father      Kidney Disease Father      Cancer Paternal Grandmother         ovarian ca     Cerebrovascular Disease Paternal Grandfather      Cerebrovascular Disease Maternal Grandfather      Kidney Disease Paternal Aunt      Skin Cancer No family hx of      Glaucoma No family hx of      Macular Degeneration No family hx of      Amblyopia No family hx of      Melanoma No family hx of        Medications:  Current Outpatient Medications   Medication Sig Dispense Refill     acetaminophen (TYLENOL) 325 MG tablet Take 1-2 tablets by mouth every 6 hours as needed.       albuterol (PROAIR HFA) 108 (90 Base) MCG/ACT Inhaler Inhale 2 puffs into the lungs every 6 hours as needed for shortness of breath / dyspnea or wheezing 1 Inhaler 2     amLODIPine (NORVASC) 5 MG tablet Take 1 tablet (5 mg) by mouth daily 100 tablet 3     aspirin 81 MG tablet Take 81 mg by mouth daily        atorvastatin (LIPITOR) 20 MG tablet Take 1 tablet (20 mg) by mouth daily 100 tablet 3     BETA BLOCKER NOT PRESCRIBED, INTENTIONAL, Beta Blocker not prescribed intentionally due to Bradycardia < 50 bpm without beta  blocker therapy  0     budesonide (PULMICORT FLEXHALER) 90 MCG/ACT inhaler Inhale 2 puffs into the lungs 2 times daily 1 each 3     calcium citrate-vitamin D (CITRACAL) 315-200 MG-UNIT TABS Take 1 tablet by mouth daily.       clopidogrel (PLAVIX) 75 MG tablet Take 1 tablet (75 mg) by mouth daily 100 tablet 3     entecavir (BARACLUDE) 0.5 MG tablet Take 1 tablet (0.5 mg) by mouth daily 90 tablet 3     FLUoxetine (PROZAC) 10 MG capsule Take 1 capsule (10 mg) by mouth daily With 40mg daily for a total daily dose of 50mg 90 capsule 0     FLUoxetine (PROZAC) 40 MG capsule Take 1 capsule (40 mg) by mouth daily 90 capsule 0     fluticasone (FLONASE) 50 MCG/ACT spray Spray 1-2 sprays into both nostrils daily 3 Bottle 11     fluticasone-salmeterol (ADVAIR) 100-50 MCG/DOSE inhaler Inhale 1 puff into the lungs every 12 hours 1 Inhaler 0     fluticasone-salmeterol (ADVAIR) 250-50 MCG/DOSE diskus inhaler Inhale 1 puff into the lungs every 12 hours 60 Inhaler 1     isosorbide mononitrate (IMDUR) 30 MG 24 hr tablet Take 0.5 tablets (15 mg) by mouth daily Need appointment for further refills 50 tablet 3     latanoprost (XALATAN) 0.005 % ophthalmic solution Place 1 drop into both eyes At Bedtime Please keep 10-9-19 clinic appt with Dr. Rizzo, for further refills 2.5 mL 0     Multiple Vitamins-Iron (ONE DAILY MULTIVITAMIN/IRON) TABS Take 1 tablet by mouth daily       mycophenolate (GENERIC EQUIVALENT) 250 MG capsule Take 3 capsules (750 mg) by mouth 2 times daily 180 capsule 0     Omega-3 Fatty Acids (OMEGA 3 PO) Take 1 capsule by mouth daily Dose unknown       pantoprazole (PROTONIX) 40 MG EC tablet Take 1 tablet (40 mg) by mouth daily 90 tablet 3     predniSONE (DELTASONE) 5 MG tablet Take 5 mg by mouth daily. 90 tablet 3       Allergies   Allergen Reactions     Penicillins Shortness Of Breath and Hives     Keflex [Cephalexin Hcl] Other (See Comments)     Pt could not recall reaction     Tetracycline Other (See Comments)      Patient could not recall reaction     Sulfa Drugs Rash       Review of Systems:  -As per HPI  -Constitutional: Otherwise feeling well today, in usual state of health.  -HEENT: Patient denies nonhealing oral sores.  -Skin: As above in HPI. No additional skin concerns.    Physical exam:  Vitals: There were no vitals taken for this visit.  GEN: This is a well developed, well-nourished male in no acute distress, in a pleasant mood.    SKIN: Full skin, which includes the head/face, both arms, chest, back, abdomen,both legs, genitalia and/or groin buttocks, digits and/or nails, was examined.  -There is sebaceous hyperplasia of the forehead  -Within the superior end of the TKA scar on L leg, there is a 1.2 cm freely mobile cystic subcutaneous nodule most consistent with and epidermal inclusion cyst   - L arm and L hand with gritty plaques with some white overlying scale  -There are numerous waxy stuck on tan to brown to black papules on the trunk and extremities.  - gritty light brown papule with some brown, sanguineous crust on a mildly erythematous base  -No other lesions of concern on areas examined.             Impression/Plan:    1. Neoplasm of uncertain behavior on the left shoulder. The differential diagnosis includes AK v SCC v BCC.      Cryotherapy procedure note (performed by faculty): After verbal consent and discussion of risks and benefits including but no limited to dyspigmentation/scar, blister, infection, recurrence, 2was(were) treated with 1-2mm freeze border for 2 cycles with liquid nitrogen. Post cryotherapy instructions were provided.     2. Actinic keratosis, inflamed. L arm and L hand.    Cryotherapy procedure note (performed by faculty): After verbal consent and discussion of risks and benefits including but no limited to dyspigmentation/scar, blister, infection, recurrence, 2was(were) treated with 1-2mm freeze border for 2 cycles with liquid nitrogen. Post cryotherapy instructions were provided.        3. Acrochordon, irritated. 2 left anterior neck    Cryotherapy procedure note (performed by faculty): After verbal consent and discussion of risks and benefits including but no limited to dyspigmentation/scar, blister, infection, recurrence, 2 was(were) treated with 1-2mm freeze border for 2 cycles with liquid nitrogen. Post cryotherapy instructions were provided.     4. Seborrheic keratosis, non irritated    Benign nature was discussed.No further intervention required at this time.     5. Intertrigo    Start Zeasorb AF, hydrocortisone 2.5 % ointment prn    6.         L thigh epidermal inclusion cyst.      Follow-up in 6-12 months, earlier for new or changing lesions.     Staff Involved:  I, Mitzi Valdez MS4, saw and staffed this patient with my attending Dr. Gab TREVINO.    Staff attestation:  The documentation recorded by the scribe accurately reflects the services I personally performed and the decisions I personally made. I have made edits where needed.    Foster De Guzman MD  Staff Dermatologist and Dermatopathologist  , Department of Dermatology

## 2019-10-08 NOTE — NURSING NOTE
Lidocaine-epinephrine 1-1:610975 % injection   1.5 mL once for one use, starting 10/8/2019 ending 10/8/2019,  2mL disp, R-0, injection  Injected by Rufina Alejo LPN

## 2019-10-08 NOTE — LETTER
10/8/2019       RE: Jerad Ross  [address]  [facility] [id number]     Dear Colleague,    Thank you for referring your patient, Jerad Ross, to the [facility] DERMATOLOGY at [facility]. Please see a copy of my visit note below.    [facility] Dermatology Note      Dermatology Problem List:  1.History of kidney transplant 10/6/1993  - Immunosuppression: prednisone 5mg, mycophenolate (previously on cyclosporine, imuran)  2. History of BCC - mid chest x 2, right axilla x 1 s/p excision   3. Inflamed seborrheic and verrucous keratoses s/p cryo  4. Intertrigo   - Zeasorb AF  - hydrocortisone 2.5% ointment  5. Filiform warts   - left lateral eyelid, left alteral canthus, left temple s/p cryo 3/15/17  6. Acrochordons   - L anterior neck s/p cryo 10/8/19  7. L thigh epidermal inclusion cyst     Encounter Date: Oct 8, 2019    CC:  Chief Complaint   Patient presents with     Skin Check     Personal hx of BCC. Jerad notes that he has a few spots that need to be frozen.     History of Present Illness:  Mr. Jerad Ross is a 59 year old male who presents for a transplant skin check. He was last seen on 12/6/2018 when lesions on L neck and L lateral forearm were biopsied which were hypertrophic actinic keratoses and verrucous keratoses. We also performed cryotherapy on 2 filiform warts and multiple irritated SKs. Today, the patient complains of some skin tags on his anterior neck that bother him and get caught on his clothes. Otherwise he has no other areas of concern. No changing lesions, abnormal bleeding, itching or painful changes, or other changes in his skin.     Past Medical History:   Patient Active Problem List   Diagnosis     BCC (basal cell carcinoma), trunk     JULIANE (obstructive sleep apnea)     HTN, kidney transplant related     Coronary artery disease involving native coronary artery of native heart without angina pectoris      NSTEMI (non-ST elevated myocardial infarction) (H)     Depression     Diverticulosis     Hemorrhoids     Kidney replaced by transplant     Basal cell carcinoma     Squamous cell carcinoma     Dyslipidemia     CRP elevated     Glaucoma     AION (acute ischemic optic neuropathy)     Paracentral scotoma     Hip pain     Inflamed seborrheic keratosis     Intertrigo     Chronic hepatitis B (H)     Immunosuppression (H)     Hypogonadism in male     GERD (gastroesophageal reflux disease)     Aftercare following organ transplant     Acute midline low back pain without sciatica     Septic bursitis     Impaired mobility     Infection of lumbar spine (H)     Past Medical History:   Diagnosis Date     AION (acute ischemic optic neuropathy)      Anemia in chronic renal disease      Avascular necrosis of bones of both hips (H)     s/p bilateral hip replacements     Basal cell carcinoma      Chronic hepatitis B (H)      Clostridium difficile colitis      Coronary artery disease involving native coronary artery of native heart without angina pectoris 6/17/2014    Coronary angiogram 6/17/14: Severe distal 3-vessel disease involving small vessels, not amenable to PCI or CABG.     CRP elevated      Depression      Diverticulosis      Dyslipidemia      FSGS (focal segmental glomerulosclerosis)      Gastric ulcer with hemorrhage 2/12/12     GERD (gastroesophageal reflux disease)      Glaucoma     OHTN     Hemorrhoids      Hypertension secondary to other renal disorders      Hypogonadism in male      Immunosuppressed status (H)      Kidney replaced by transplant     focal glomerulosclerosis      NSTEMI (non-ST elevated myocardial infarction) (H) 6/17/2014     JULIANE (obstructive sleep apnea)     Doesn't use CPAP     Paracentral scotoma     LE     Secondary renal hyperparathyroidism (H)      Squamous cell carcinoma      Past Surgical History:   Procedure Laterality Date     APPENDECTOMY       CATARACT IOL, RT/LT  4/19/2000    RE     CATARACT  IOL, RT/LT  6/1/2000    LE     COLECTOMY SUBTOTAL  1983    10 cm, diverticulitis     COLONOSCOPY  2/13/2012    Procedure:COLONOSCOPY; Surgeon:SLOAN GALLARDO; Location: GI     ESOPHAGOSCOPY, GASTROSCOPY, DUODENOSCOPY (EGD), COMBINED  2/13/2012    Procedure:COMBINED ESOPHAGOSCOPY, GASTROSCOPY, DUODENOSCOPY (EGD); Surgeon:SLOAN GALLARDO; Location: GI     EXTRACAPSULAR CATARACT EXTRATION WITH INTRAOCULAR LENS IMPLANT  4-20-10, 6-1-10    Rt, Lt     HERNIA REPAIR  1995    Lt inguinal     HIP SURGERY      1981, bilat MITZI, revised 2001, 2005     KIDNEY SURGERY  1978 and 1993    transplant     KNEE SURGERY  1983, 1987    bilat TKA     MOHS MICROGRAPHIC PROCEDURE       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 He has a 9.00 pack-year smoking history. He has quit using smokeless tobacco. He reports current alcohol use. He reports that he does not use drugs.    Family History:  Family History   Problem Relation Age of Onset     Cardiovascular Father         AI with valve repair     Hypertension Father      Kidney Disease Father      Cancer Paternal Grandmother         ovarian ca     Cerebrovascular Disease Paternal Grandfather      Cerebrovascular Disease Maternal Grandfather      Kidney Disease Paternal Aunt      Skin Cancer No family hx of      Glaucoma No family hx of      Macular Degeneration No family hx of      Amblyopia No family hx of      Melanoma No family hx of        Medications:  Current Outpatient Medications   Medication Sig Dispense Refill     acetaminophen (TYLENOL) 325 MG tablet Take 1-2 tablets by mouth every 6 hours as needed.       albuterol (PROAIR HFA) 108 (90 Base) MCG/ACT Inhaler Inhale 2 puffs into the lungs every 6 hours as needed for shortness of breath / dyspnea or wheezing 1 Inhaler 2     amLODIPine (NORVASC) 5 MG tablet Take 1 tablet (5 mg) by mouth daily 100  tablet 3     aspirin 81 MG tablet Take 81 mg by mouth daily        atorvastatin (LIPITOR) 20 MG tablet Take 1 tablet (20 mg) by mouth daily 100 tablet 3     BETA BLOCKER NOT PRESCRIBED, INTENTIONAL, Beta Blocker not prescribed intentionally due to Bradycardia < 50 bpm without beta blocker therapy  0     budesonide (PULMICORT FLEXHALER) 90 MCG/ACT inhaler Inhale 2 puffs into the lungs 2 times daily 1 each 3     calcium citrate-vitamin D (CITRACAL) 315-200 MG-UNIT TABS Take 1 tablet by mouth daily.       clopidogrel (PLAVIX) 75 MG tablet Take 1 tablet (75 mg) by mouth daily 100 tablet 3     entecavir (BARACLUDE) 0.5 MG tablet Take 1 tablet (0.5 mg) by mouth daily 90 tablet 3     FLUoxetine (PROZAC) 10 MG capsule Take 1 capsule (10 mg) by mouth daily With 40mg daily for a total daily dose of 50mg 90 capsule 0     FLUoxetine (PROZAC) 40 MG capsule Take 1 capsule (40 mg) by mouth daily 90 capsule 0     fluticasone (FLONASE) 50 MCG/ACT spray Spray 1-2 sprays into both nostrils daily 3 Bottle 11     fluticasone-salmeterol (ADVAIR) 100-50 MCG/DOSE inhaler Inhale 1 puff into the lungs every 12 hours 1 Inhaler 0     fluticasone-salmeterol (ADVAIR) 250-50 MCG/DOSE diskus inhaler Inhale 1 puff into the lungs every 12 hours 60 Inhaler 1     isosorbide mononitrate (IMDUR) 30 MG 24 hr tablet Take 0.5 tablets (15 mg) by mouth daily Need appointment for further refills 50 tablet 3     latanoprost (XALATAN) 0.005 % ophthalmic solution Place 1 drop into both eyes At Bedtime Please keep 10-9-19 clinic appt with Dr. Rizzo, for further refills 2.5 mL 0     Multiple Vitamins-Iron (ONE DAILY MULTIVITAMIN/IRON) TABS Take 1 tablet by mouth daily       mycophenolate (GENERIC EQUIVALENT) 250 MG capsule Take 3 capsules (750 mg) by mouth 2 times daily 180 capsule 0     Omega-3 Fatty Acids (OMEGA 3 PO) Take 1 capsule by mouth daily Dose unknown       pantoprazole (PROTONIX) 40 MG EC tablet Take 1 tablet (40 mg) by mouth daily 90 tablet 3      predniSONE (DELTASONE) 5 MG tablet Take 5 mg by mouth daily. 90 tablet 3       Allergies   Allergen Reactions     Penicillins Shortness Of Breath and Hives     Keflex [Cephalexin Hcl] Other (See Comments)     Pt could not recall reaction     Tetracycline Other (See Comments)     Patient could not recall reaction     Sulfa Drugs Rash       Review of Systems:  -As per HPI  -Constitutional: Otherwise feeling well today, in usual state of health.  -HEENT: Patient denies nonhealing oral sores.  -Skin: As above in HPI. No additional skin concerns.    Physical exam:  Vitals: There were no vitals taken for this visit.  GEN: This is a well developed, well-nourished male in no acute distress, in a pleasant mood.    SKIN: Full skin, which includes the head/face, both arms, chest, back, abdomen,both legs, genitalia and/or groin buttocks, digits and/or nails, was examined.  -There is sebaceous hyperplasia of the forehead  -Within the superior end of the TKA scar on L leg, there is a 1.2 cm freely mobile cystic subcutaneous nodule most consistent with and epidermal inclusion cyst   - L arm and L hand with gritty plaques with some white overlying scale  -There are numerous waxy stuck on tan to brown to black papules on the trunk and extremities.  - gritty light brown papule with some brown, sanguineous crust on a mildly erythematous base  -No other lesions of concern on areas examined.             Impression/Plan:    1. Neoplasm of uncertain behavior on the left shoulder. The differential diagnosis includes AK v SCC v BCC.      Cryotherapy procedure note (performed by faculty): After verbal consent and discussion of risks and benefits including but no limited to dyspigmentation/scar, blister, infection, recurrence, 2was(were) treated with 1-2mm freeze border for 2 cycles with liquid nitrogen. Post cryotherapy instructions were provided.     2. Actinic keratosis, inflamed. L arm and L hand.    Cryotherapy procedure note (performed  by faculty): After verbal consent and discussion of risks and benefits including but no limited to dyspigmentation/scar, blister, infection, recurrence, 2was(were) treated with 1-2mm freeze border for 2 cycles with liquid nitrogen. Post cryotherapy instructions were provided.       3. Acrochordon, irritated. 2 left anterior neck    Cryotherapy procedure note (performed by faculty): After verbal consent and discussion of risks and benefits including but no limited to dyspigmentation/scar, blister, infection, recurrence, 2 was(were) treated with 1-2mm freeze border for 2 cycles with liquid nitrogen. Post cryotherapy instructions were provided.     4. Seborrheic keratosis, non irritated    Benign nature was discussed.No further intervention required at this time.     5. Intertrigo    Start Zeasorb AF, hydrocortisone 2.5 % ointment prn    6.         L thigh epidermal inclusion cyst.      Follow-up in 6-12 months, earlier for new or changing lesions.     Staff Involved:  I, Mitzi Valdez MS4, saw and staffed this patient with my attending Dr. Gab TREVINO.    Staff attestation:  The documentation recorded by the scribe accurately reflects the services I personally performed and the decisions I personally made. I have made edits where needed.    Foster De Guzman MD  Staff Dermatologist and Dermatopathologist  , Department of Dermatology

## 2019-10-09 ENCOUNTER — OFFICE VISIT (OUTPATIENT)
Dept: OPHTHALMOLOGY | Facility: CLINIC | Age: 59
End: 2019-10-09
Attending: OPHTHALMOLOGY
Payer: MEDICARE

## 2019-10-09 DIAGNOSIS — H40.053 BORDERLINE GLAUCOMA WITH OCULAR HYPERTENSION, BILATERAL: Primary | ICD-10-CM

## 2019-10-09 DIAGNOSIS — H26.491 SECONDARY MEMBRANE, OBSCURING VISION, RIGHT: ICD-10-CM

## 2019-10-09 PROCEDURE — 92083 EXTENDED VISUAL FIELD XM: CPT | Mod: ZF | Performed by: OPHTHALMOLOGY

## 2019-10-09 PROCEDURE — G0463 HOSPITAL OUTPT CLINIC VISIT: HCPCS | Mod: ZF

## 2019-10-09 PROCEDURE — 66821 AFTER CATARACT LASER SURGERY: CPT | Mod: RT,ZF | Performed by: OPHTHALMOLOGY

## 2019-10-09 RX ORDER — LATANOPROST 50 UG/ML
1 SOLUTION/ DROPS OPHTHALMIC AT BEDTIME
Qty: 2.5 ML | Refills: 11 | Status: SHIPPED | OUTPATIENT
Start: 2019-10-09 | End: 2020-08-18

## 2019-10-09 ASSESSMENT — VISUAL ACUITY
OS_SC+: -2
OS_PH_SC: 20/30
OS_PH_SC+: -2
OD_SC: 20/400
OS_SC: 20/40
METHOD: SNELLEN - LINEAR

## 2019-10-09 ASSESSMENT — CUP TO DISC RATIO
OS_RATIO: 0.65
OD_RATIO: 0.2

## 2019-10-09 ASSESSMENT — CONF VISUAL FIELD
OD_INFERIOR_NASAL_RESTRICTION: 1
OD_INFERIOR_TEMPORAL_RESTRICTION: 1
OD_SUPERIOR_TEMPORAL_RESTRICTION: 1
OS_NORMAL: 1
OD_SUPERIOR_NASAL_RESTRICTION: 3

## 2019-10-09 ASSESSMENT — SLIT LAMP EXAM - LIDS
COMMENTS: NORMAL
COMMENTS: NORMAL

## 2019-10-09 ASSESSMENT — TONOMETRY
OD_IOP_MMHG: 20
IOP_METHOD: ICARE
OS_IOP_MMHG: 19

## 2019-10-09 ASSESSMENT — EXTERNAL EXAM - LEFT EYE: OS_EXAM: NORMAL

## 2019-10-09 ASSESSMENT — EXTERNAL EXAM - RIGHT EYE: OD_EXAM: NORMAL

## 2019-10-09 NOTE — NURSING NOTE
Chief Complaints and History of Present Illnesses   Patient presents with     Glaucoma Follow-Up     Posterior capsular opacification of right eye, obscuring vision      Chief Complaint(s) and History of Present Illness(es)     Glaucoma Follow-Up     Associated symptoms: Negative for eye pain, dryness, tearing, flashes and floaters    Comments: Posterior capsular opacification of right eye, obscuring vision               Comments     Pt states that VA is about the same.  Pt compliant with taking drops.  Pt says VA BE is not as sharp as it was right after the CE IOL surgeries.  Pt has no pain or other concerns or questions at this time.    SAURAV Mejia October 9, 2019 12:52 PM

## 2019-10-09 NOTE — PROGRESS NOTES
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   PC intraocular lens (not extremely nearsighted preop)   4/2010 6/2010    Latanoprost both eyes at bedtime     Worked in health administration, semi-retired/disability    Octopus visual field both eyes 10/9/19   Right eye LVC with cecocentral scotoma, stable   Left eye superior and inferior arcuate, stable    Impression  Ocular hypertension, possible glaucoma with Anterior ischemic optic neuropathy (AION) overlay  OCT very thin, not helpful for follow-up except nerve rim may be useful in the future since it is much better than retinal nerve fiber layer   Would treat intraocular pressure since we cannot be sure if there is a visual field component of glaucomatous loss    Plan  Continue latanoprost both eyes   (yearly LVC right eye and 24-2 left eye Nov 2020)  Yag capsulotomy right eye today  Return to clinic 6 months with OCT retinal nerve fiber layer     Attending Physician Attestation:  Complete documentation of historical and exam elements from today's encounter can be found in the full encounter summary report (not reduplicated in this progress note). I personally obtained the chief complaint(s) and history of present illness. I confirmed and edited asnecessary the review of systems, past medical/surgical history, family history, social history, and examination findings as documented by others; and I examined the patient myself. I personally reviewed the relevant tests, images, and reports as documented above. I formulated and edited as necessary the assessment and plan and discussed the findings and management plan with the patient and family.  - Viki Rizzo MD 2:10 PM 10/9/2019

## 2019-10-10 LAB — COPATH REPORT: NORMAL

## 2019-10-11 DIAGNOSIS — Z94.0 KIDNEY TRANSPLANTED: ICD-10-CM

## 2019-10-11 RX ORDER — MYCOPHENOLATE MOFETIL 250 MG/1
CAPSULE ORAL
Qty: 180 CAPSULE | Refills: 11 | Status: SHIPPED | OUTPATIENT
Start: 2019-10-11 | End: 2020-10-20

## 2019-10-15 ENCOUNTER — TELEPHONE (OUTPATIENT)
Dept: TRANSPLANT | Facility: CLINIC | Age: 59
End: 2019-10-15

## 2019-10-15 ENCOUNTER — TELEPHONE (OUTPATIENT)
Dept: INTERNAL MEDICINE | Facility: CLINIC | Age: 59
End: 2019-10-15

## 2019-10-15 NOTE — TELEPHONE ENCOUNTER
[facility] Call Center    Phone Message    May a detailed message be left on voicemail: yes    Reason for Call: Medication Question or concern regarding medication   Prescription Clarification  Name of Medication: pantoprazole (PROTONIX) 40 MG EC tablet    omeprazole     Prescribing Provider: Aston Perry   Pharmacy: [facility]      What on the order needs clarification? [facility] called because they are trying to verify which of these medications the patient is supposed to be taking.          Action Taken: Message routed to:  [facility] ([facility] LIBAN

## 2019-10-15 NOTE — TELEPHONE ENCOUNTER
Patient's pharmacy called stated they are trying to reach out to the patient to refill medications.

## 2019-10-16 NOTE — TELEPHONE ENCOUNTER
Patients omeprazole was stopped on 6/5/19 when new Rx for pantoprazole was called into pharmacy. Omeprazole was also reported to clinic as to not be covered by patients insurance. Spoke to pharmacy to relay patient should be on pantoprazole. Britany Love LPN 10/16/2019 9:58 AM

## 2019-10-22 ENCOUNTER — TELEPHONE (OUTPATIENT)
Dept: OTHER | Facility: OUTPATIENT CENTER | Age: 59
End: 2019-10-22

## 2019-10-22 NOTE — TELEPHONE ENCOUNTER
[facility] Telephone Intake    Date:  2019  Client Name:  Jerad Ross      MRN:  [id number]   :  1960       Age:  59 year old     Presenting Problem / Reason for Assessment   (Clinical History &Symptoms):     Excessive pornography and masturbation.   Wants to discuss thoughts on sexual anorexia and sexual addiction vs compulsive behavior. Expressed concern over state of marriage. Currently in sober house. Re-started PARKER about 1 month ago. Has refrained from pornography use and masturbation for 6 months.     Suggested Program:  CSB    Length of time experiencing Symptoms:  Increased with internet.    Seen Other Providers (if so, where):  M.D. :  Aston Perry  Therapist:  EMDR therapist-3 months  Psychiatrist:  Genia Galvan ([facility])    Is presenting concern primarily sexual or mental health:      Medications:    See chart    Follow Up:    Insurance Benefits to be evaluated.  Note will be entered when validated.    Patient wishes to be contacted regarding Insurance benefits: YES  Please Verify Registration    Please send Welcome Packet and document date sent.

## 2019-10-23 NOTE — TELEPHONE ENCOUNTER
.Per: ROSEMARY barnett/ MEDICARE (WEB) BCBS (REP)  Copay$ 0   Ded$ 185.00; Met$ 185.00   Coins 20%    Out of Pocket Max$ 0 ; Met$ 0     Psych testing require auth (96775/15800)? no  Extended therapy require auth (17520)?  no    Exclusions:   Family Therapy ([id number])  NO    Marriage couple counseling  YES   Transgender/Gender Dysphoria no   CSB no   Sexual dysfunction no    Patient Contacted about benefits:  SPOKE TO PATIENT RE:INSU  Date contacted: 10/23/2019

## 2019-10-28 ENCOUNTER — ANCILLARY PROCEDURE (OUTPATIENT)
Dept: ULTRASOUND IMAGING | Facility: CLINIC | Age: 59
End: 2019-10-28
Attending: INTERNAL MEDICINE
Payer: MEDICARE

## 2019-10-28 ENCOUNTER — OFFICE VISIT (OUTPATIENT)
Dept: ANESTHESIOLOGY | Facility: CLINIC | Age: 59
End: 2019-10-28
Payer: MEDICARE

## 2019-10-28 VITALS
WEIGHT: 175 LBS | HEIGHT: 67 IN | HEART RATE: 76 BPM | BODY MASS INDEX: 27.47 KG/M2 | OXYGEN SATURATION: 97 % | DIASTOLIC BLOOD PRESSURE: 78 MMHG | RESPIRATION RATE: 16 BRPM | SYSTOLIC BLOOD PRESSURE: 121 MMHG

## 2019-10-28 DIAGNOSIS — Z48.298 AFTERCARE FOLLOWING ORGAN TRANSPLANT: ICD-10-CM

## 2019-10-28 DIAGNOSIS — M25.511 PAIN IN JOINT OF RIGHT SHOULDER: Primary | ICD-10-CM

## 2019-10-28 ASSESSMENT — ENCOUNTER SYMPTOMS
NERVOUS/ANXIOUS: 1
ARTHRALGIAS: 1
SMELL DISTURBANCE: 0
HYPOTENSION: 0
HYPERTENSION: 0
BLOOD IN STOOL: 0
VOMITING: 0
DIARRHEA: 0
BLOATING: 0
SINUS PAIN: 0
ORTHOPNEA: 0
BACK PAIN: 1
WHEEZING: 1
SINUS CONGESTION: 1
SLEEP DISTURBANCES DUE TO BREATHING: 0
ABDOMINAL PAIN: 1
SYNCOPE: 0
LEG PAIN: 1
PANIC: 0
JAUNDICE: 0
MYALGIAS: 1
SPUTUM PRODUCTION: 0
COUGH: 1
BOWEL INCONTINENCE: 0
RECTAL PAIN: 0
NAUSEA: 0
HEARTBURN: 1
TASTE DISTURBANCE: 0
CONSTIPATION: 1
MUSCLE WEAKNESS: 1
LIGHT-HEADEDNESS: 0
DYSPNEA ON EXERTION: 0
STIFFNESS: 1
DEPRESSION: 1
HEMOPTYSIS: 0
SHORTNESS OF BREATH: 1
NECK MASS: 0
PALPITATIONS: 0
TROUBLE SWALLOWING: 0
NECK PAIN: 0
SNORES LOUDLY: 0
COUGH DISTURBING SLEEP: 1
EXERCISE INTOLERANCE: 0
HOARSE VOICE: 1
INSOMNIA: 1
SORE THROAT: 0
MUSCLE CRAMPS: 0
POSTURAL DYSPNEA: 0
JOINT SWELLING: 1
DECREASED CONCENTRATION: 1

## 2019-10-28 ASSESSMENT — ANXIETY QUESTIONNAIRES
1. FEELING NERVOUS, ANXIOUS, OR ON EDGE: SEVERAL DAYS
7. FEELING AFRAID AS IF SOMETHING AWFUL MIGHT HAPPEN: NOT AT ALL
7. FEELING AFRAID AS IF SOMETHING AWFUL MIGHT HAPPEN: NOT AT ALL
3. WORRYING TOO MUCH ABOUT DIFFERENT THINGS: SEVERAL DAYS
GAD7 TOTAL SCORE: 6
4. TROUBLE RELAXING: SEVERAL DAYS
2. NOT BEING ABLE TO STOP OR CONTROL WORRYING: SEVERAL DAYS
GAD7 TOTAL SCORE: 6
5. BEING SO RESTLESS THAT IT IS HARD TO SIT STILL: MORE THAN HALF THE DAYS
6. BECOMING EASILY ANNOYED OR IRRITABLE: NOT AT ALL

## 2019-10-28 ASSESSMENT — PAIN SCALES - GENERAL: PAINLEVEL: MILD PAIN (3)

## 2019-10-28 ASSESSMENT — MIFFLIN-ST. JEOR: SCORE: 1577.42

## 2019-10-28 NOTE — NURSING NOTE
LPN reviewed AVS with Pt.  Pt verbalized an understanding of information, and was asked to contact clinic with questions.      LPN read through the instructions with pt for the recommended procedure:     Right shoulder joint steroid injection with fluoroscopy.    Pt verbalized understanding to holding appropriate medication per protocol, and was agreeable to NPO policy and needing a .      Dr. Nava is agreeable to the pt continuing their Plavix. They do not need to hold this medication for this injection.    Lorin Funk, RACHELE

## 2019-10-28 NOTE — PROGRESS NOTES
Pain Clinic New Patient Consult Note:    Referring Provider: [place]   Primary care provider: Aston Perry.    Jerad Ross is a 59 year old y.o. old male who presents to the pain clinic with right shoulder pain.    HPI:  Mr. Ross 59-year-old male with past medical history significant for avascular necrosis of both hips status post hip replacement bilateral knee replacement status post kidney transplant presents to the pain clinic himself for right shoulder pain which is localized over the right shoulder area especially in the posterior part.  He states that the pain is worsening and has been present for over 5 years.  He is unable to provide specific onset of this pain.  The patient states laying on his right side worsens his pain.  At sitting his pain is 2/10 with motion of the right shoulder area his pain worsens he describes the pain as sharp.  Pain is reduced with distracting his thoughts.  He is describes that he has been offered shoulder replacement surgery and has had shoulder joint steroid injections in the past with relief in pain.  He has not had any formal physical therapy for his right shoulder area.  He denies any numbness tingling or weakness in the right forearm and hand.  He denies any neck pain.    Tests/Imaging reviewed with the patient:    None available    Significant Medical History:   Past Medical History:   Diagnosis Date     AION (acute ischemic optic neuropathy)      Anemia in chronic renal disease      Avascular necrosis of bones of both hips (H)     s/p bilateral hip replacements     Basal cell carcinoma      Chronic hepatitis B (H)      Clostridium difficile colitis      Coronary artery disease involving native coronary artery of native heart without angina pectoris 6/17/2014    Coronary angiogram 6/17/14: Severe distal 3-vessel disease involving small vessels, not amenable to PCI or CABG.     CRP elevated      Depression      Diverticulosis      Dyslipidemia       FSGS (focal segmental glomerulosclerosis)      Gastric ulcer with hemorrhage 2/12/12     GERD (gastroesophageal reflux disease)      Glaucoma     OHTN     Hemorrhoids      Hypertension secondary to other renal disorders      Hypogonadism in male      Immunosuppressed status (H)      Kidney replaced by transplant     focal glomerulosclerosis      NSTEMI (non-ST elevated myocardial infarction) (H) 6/17/2014     JULIANE (obstructive sleep apnea)     Doesn't use CPAP     Paracentral scotoma     LE     Secondary renal hyperparathyroidism (H)      Squamous cell carcinoma           Past Surgical History:  Past Surgical History:   Procedure Laterality Date     APPENDECTOMY       CATARACT IOL, RT/LT  4/19/2000    RE     CATARACT IOL, RT/LT  6/1/2000    LE     COLECTOMY SUBTOTAL  1983    10 cm, diverticulitis     COLONOSCOPY  2/13/2012    Procedure:COLONOSCOPY; Surgeon:SLOAN GALLARDO; Location: GI     ESOPHAGOSCOPY, GASTROSCOPY, DUODENOSCOPY (EGD), COMBINED  2/13/2012    Procedure:COMBINED ESOPHAGOSCOPY, GASTROSCOPY, DUODENOSCOPY (EGD); Surgeon:SLOAN GALLARDO; Location: GI     EXTRACAPSULAR CATARACT EXTRATION WITH INTRAOCULAR LENS IMPLANT  4-20-10, 6-1-10    Rt, Lt     HERNIA REPAIR  1995    Lt inguinal     HIP SURGERY      1981, bilat MITZI, revised 2001, 2005     KIDNEY SURGERY  1978 and 1993    transplant     KNEE SURGERY  1983, 1987    bilat TKA     MOHS MICROGRAPHIC PROCEDURE       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, auxiliary spleen     TONSILLECTOMY            Family History:  Family History   Problem Relation Age of Onset     Cardiovascular Father         AI with valve repair     Hypertension Father      Kidney Disease Father      Cancer Paternal Grandmother         ovarian ca     Cerebrovascular Disease Paternal Grandfather      Cerebrovascular Disease Maternal Grandfather      Kidney Disease Paternal Aunt      Skin Cancer No family hx of      Glaucoma No family hx of      Macular Degeneration  No family hx of      Amblyopia No family hx of      Melanoma No family hx of           Social History:  Social History     Socioeconomic History     Marital status:      Spouse name: Not on file     Number of children: 0     Years of education: Not on file     Highest education level: Not on file   Occupational History     Employer: DISABLED     Occupation:      Employer: [employer]   Social Needs     Financial resource strain: Not on file     Food insecurity:     Worry: Not on file     Inability: Not on file     Transportation needs:     Medical: Not on file     Non-medical: Not on file   Tobacco Use     Smoking status: Former Smoker     Packs/day: 1.00     Years: 9.00     Pack years: 9.00     Last attempt to quit: 1988     Years since quittin.8     Smokeless tobacco: Former User   Substance and Sexual Activity     Alcohol use: Yes     Alcohol/week: 0.0 standard drinks     Comment: rarely 1 drink per month     Drug use: No     Sexual activity: Not on file   Lifestyle     Physical activity:     Days per week: Not on file     Minutes per session: Not on file     Stress: Not on file   Relationships     Social connections:     Talks on phone: Not on file     Gets together: Not on file     Attends Restorationism service: Not on file     Active member of club or organization: Not on file     Attends meetings of clubs or organizations: Not on file     Relationship status: Not on file     Intimate partner violence:     Fear of current or ex partner: Not on file     Emotionally abused: Not on file     Physically abused: Not on file     Forced sexual activity: Not on file   Other Topics Concern     Parent/sibling w/ CABG, MI or angioplasty before 65F 55M? Not Asked      Service Not Asked     Blood Transfusions Not Asked     Caffeine Concern Not Asked     Occupational Exposure Not Asked     Hobby Hazards Not Asked     Sleep Concern Not Asked     Stress Concern Not Asked     Weight Concern  Not Asked     Special Diet No     Back Care Not Asked     Exercise Yes     Comment: walks     Bike Helmet Not Asked     Seat Belt Not Asked     Self-Exams Not Asked   Social History Narrative    . Wife is also a kidney transplant recipient.     Works part-time     Social History     Patient does not qualify to have social determinant information on file (likely too young).   Social History Narrative    . Wife is also a kidney transplant recipient.     Works part-time          Allergies:  Allergies   Allergen Reactions     Penicillins Shortness Of Breath and Hives     Keflex [Cephalexin Hcl] Other (See Comments)     Pt could not recall reaction     Tetracycline Other (See Comments)     Patient could not recall reaction     Sulfa Drugs Rash       Current Medications:   Current Outpatient Medications   Medication Sig Dispense Refill     acetaminophen (TYLENOL) 325 MG tablet Take 1-2 tablets by mouth every 6 hours as needed.       albuterol (PROAIR HFA) 108 (90 Base) MCG/ACT Inhaler Inhale 2 puffs into the lungs every 6 hours as needed for shortness of breath / dyspnea or wheezing 1 Inhaler 2     amLODIPine (NORVASC) 5 MG tablet Take 1 tablet (5 mg) by mouth daily 100 tablet 3     aspirin 81 MG tablet Take 81 mg by mouth daily        atorvastatin (LIPITOR) 20 MG tablet Take 1 tablet (20 mg) by mouth daily 100 tablet 3     budesonide (PULMICORT FLEXHALER) 90 MCG/ACT inhaler Inhale 2 puffs into the lungs 2 times daily 1 each 3     calcium citrate-vitamin D (CITRACAL) 315-200 MG-UNIT TABS Take 1 tablet by mouth daily.       clopidogrel (PLAVIX) 75 MG tablet Take 1 tablet (75 mg) by mouth daily 100 tablet 3     entecavir (BARACLUDE) 0.5 MG tablet Take 1 tablet (0.5 mg) by mouth daily 90 tablet 3     FLUoxetine (PROZAC) 10 MG capsule Take 1 capsule (10 mg) by mouth daily With 40mg daily for a total daily dose of 50mg 90 capsule 0     FLUoxetine (PROZAC) 40 MG capsule Take 1 capsule (40 mg) by mouth daily 90  capsule 0     isosorbide mononitrate (IMDUR) 30 MG 24 hr tablet Take 0.5 tablets (15 mg) by mouth daily Need appointment for further refills 50 tablet 3     latanoprost (XALATAN) 0.005 % ophthalmic solution Place 1 drop into both eyes At Bedtime Please keep 10-9-19 clinic appt with Dr. Rizzo, for further refills 2.5 mL 11     Multiple Vitamins-Iron (ONE DAILY MULTIVITAMIN/IRON) TABS Take 1 tablet by mouth daily       mycophenolate (GENERIC EQUIVALENT) 250 MG capsule TAKE 3 CAPSULES BY MOUTH TWICE DAILY 180 capsule 11     Omega-3 Fatty Acids (OMEGA 3 PO) Take 1 capsule by mouth daily Dose unknown       pantoprazole (PROTONIX) 40 MG EC tablet Take 1 tablet (40 mg) by mouth daily 90 tablet 3     predniSONE (DELTASONE) 5 MG tablet Take 5 mg by mouth daily. 90 tablet 3     BETA BLOCKER NOT PRESCRIBED, INTENTIONAL, Beta Blocker not prescribed intentionally due to Bradycardia < 50 bpm without beta blocker therapy (Patient not taking: Reported on 10/8/2019)  0     fluticasone (FLONASE) 50 MCG/ACT spray Spray 1-2 sprays into both nostrils daily (Patient not taking: Reported on 10/8/2019) 3 Bottle 11     fluticasone-salmeterol (ADVAIR) 100-50 MCG/DOSE inhaler Inhale 1 puff into the lungs every 12 hours (Patient not taking: Reported on 10/8/2019) 1 Inhaler 0     fluticasone-salmeterol (ADVAIR) 250-50 MCG/DOSE diskus inhaler Inhale 1 puff into the lungs every 12 hours (Patient not taking: Reported on 10/8/2019) 60 Inhaler 1          Current Pain Medications:  Medications related to Pain Management (From now, onward)    None           Past Pain Medications:  He has tried medicines like codeine in the past for shoulder pain and does not want to be on any stronger pain meds at this point for his chronic joint pains    Blood thinner:    Plavix      Psychosocial History:     History of treatment for behavioral disorder: The patient follow-ups with psychiatry at our institution    Review of Systems:  Review of Systems   HENT:  "Negative for ear discharge, ear pain, hearing loss, nosebleeds, sinus pain, sore throat and tinnitus.    Respiratory: Positive for cough, shortness of breath and wheezing. Negative for hemoptysis and sputum production.    Cardiovascular: Positive for chest pain. Negative for palpitations and orthopnea.   Gastrointestinal: Positive for abdominal pain, constipation and heartburn. Negative for blood in stool, diarrhea, melena, nausea and vomiting.   Musculoskeletal: Positive for back pain and myalgias. Negative for neck pain.   Psychiatric/Behavioral: Positive for depression. The patient is nervous/anxious and has insomnia.          Physical Exam:     Vitals:    10/28/19 1134   BP: 121/78   Pulse: 76   Resp: 16   SpO2: 97%   Weight: 79.4 kg (175 lb)   Height: 1.702 m (5' 7\")       General Appearance: No distress, seated comfortably  Mood: Euthymic  HE ENT: Non constricted pupils  Respiratory: Non labored breathing  CVS: Regular rate and rhythm, bilateral radial pulses are palpable  GI: Soft, non distended, no TTP  Skin: No rashes over exposed skin, multiple papules noted all over the skin.  Well-healed surgical scar noted over the left distal radius area  MS: Right shoulder tender to palpation at the suprascapular notch, AC joint is also tender to palpation his range of motion of the right shoulder is limited to 90 degrees abduction but is able to complete the arc with  assistance follow-up  Neuro: Bilateral upper extremities intact to light touch motor strength is grossly equal bilaterally  Lymph nodes no palpable cervical lymph nodes at this time      Laboratory results:  Recent Labs   Lab Test 09/20/19  1137 04/02/19  0912    134   POTASSIUM 3.4 3.8   CHLORIDE 106 102   CO2 26 26   ANIONGAP 6 6   GLC 97 80   BUN 11 12   CR 0.75 0.74   HEMA 9.0 9.6       CBC RESULTS:   Recent Labs   Lab Test 09/27/19  1147 09/20/19  1137   WBC 11.2* 11.9*   RBC  --  4.80   HGB  --  13.9   HCT  --  43.3   MCV  --  90   MCH  --  " 29.0   [facility]  [facility]   RDW  --  14.6   PLT  --  345         Imaging:       ASSESSMENT AND PLAN:     Encounter Diagnosis:    #1 right shoulder joint pain   #2 osteoarthritis right shoulder area  #3 on anticoagulation with Plavix  #4 history of renal transplant    Jerad Ross is a 59 year old y.o. old male who presents to the pain clinic with right shoulder pain    Patient's history, physical exam and imaging are suggestive of their pain is resulting from severe right shoulder osteoarthritis.     I have summarized the patient history, discussed their clinical findings and differential diagnosis with the patient. I have discussed anatomy and possible sources of the pain using models and/or pictures(diagrams). I have discussed multi- disciplinary pain management options with the patient as pertaining to their case as detailed above. All questions and concerns were answered to the best of my ability.     RECOMMENDATIONS:     1. Medications: The patient was curious about ongoing opioids although he states that he is not interested in continuing these medications.  We discussed that multiple research studies have demonstrated that long term narcotic therapy for chronic non malignant pain is not efficacious. Moreover, its associated with many side effects such as opioid-induced hyperalgesia, endocrine imbalance, central sleep apnea and overdose death. In this setting, we advised the patient to wean and discontinue use of his opioid medications.      2. Procedure: We are scheduling the patient for right shoulder joint steroid injection with fluoroscopy in the procedure suite. Risks/benefits/alternatives were discussed.     The possible risks involved with inerventional treatment are as following but not limited to- no pain control, worsened pain, stroke, seizure, spinal headache, allergic reactions, introduction of infection or bleeding which may lead to emergent spine surgery, nerve damage, paralysis or even  death.    3. Physical therapy: I have refered the patient for outpatient physical therapy for stretching, strengthening and home exercise program for right shoulder pain. The patient will also discuss spine care and posture. Pool therapy and stretches can be considered if available.    4.  Patient was interested about chronic pain I have shared with him the YouTube video about understanding pain in less than 5 minutes with him today in the clinic.  The patient is very appreciative.  The patient is also interested in biofeedback as well and referral was provided to him for pain psychology as well. In the future suprascapular nerve block can be considered.     Follow up: 4 weeks after completion of shoulder joint steroid injections                                                                                              Answers for HPI/ROS submitted by the patient on 10/28/2019   KIM 7 TOTAL SCORE: 6  General Symptoms: No  Skin Symptoms: No  HENT Symptoms: Yes  EYE SYMPTOMS: No  HEART SYMPTOMS: Yes  LUNG SYMPTOMS: Yes  INTESTINAL SYMPTOMS: Yes  URINARY SYMPTOMS: No  REPRODUCTIVE SYMPTOMS: No  SKELETAL SYMPTOMS: Yes  BLOOD SYMPTOMS: No  NERVOUS SYSTEM SYMPTOMS: No  MENTAL HEALTH SYMPTOMS: Yes  Congestion: Yes  Trouble swallowing: No   Voice hoarseness: Yes  Mouth sores: No  Tooth pain: No  Gum tenderness: No  Bleeding gums: No  Change in taste: No  Change in sense of smell: No  Dry mouth: No  Hearing aid used: No  Neck lump: No  Snoring: No  Difficulty breathing on exertion: No  Nighttime Cough: Yes  Difficulty breathing when lying flat: No  Pain in legs with walking: Yes  Fingers or toes appear blue: No  High blood pressure: No  Low blood pressure: No  Fainting: No  Murmurs: No  Pacemaker: No  Varicose veins: No  Edema or swelling: No  Wake up at night with shortness of breath: No  Light-headedness: No  Exercise intolerance: No  Bloating: No  Rectal or Anal pain: No  Fecal incontinence: No  Yellowing of skin or  eyes: No  Vomit with blood: No  Change in stools: No  Swollen joints: Yes  Joint pain: Yes  Bone pain: Yes  Muscle cramps: No  Muscle weakness: Yes  Joint stiffness: Yes  Bone fracture: No  Trouble thinking or concentrating: Yes  Mood changes: No  Panic attacks: No

## 2019-10-28 NOTE — LETTER
10/28/2019       RE: Jerad Ross  [address]  [facility] [id number]     Dear Colleague,    Thank you for referring your patient, Jerad Ross, to the [facility] CLIN[facility] FOR COMPREHENSIVE PAIN MANAGEMENT at [facility]. Please see a copy of my visit note below.    Pain Clinic New Patient Consult Note:    Referring Provider: [place]   Primary care provider: Aston Perry.    Jerad Ross is a 59 year old y.o. old male who presents to the pain clinic with right shoulder pain.    HPI:  Mr. Ross 59-year-old male with past medical history significant for avascular necrosis of both hips status post hip replacement bilateral knee replacement status post kidney transplant presents to the pain clinic himself for right shoulder pain which is localized over the right shoulder area especially in the posterior part.  He states that the pain is worsening and has been present for over 5 years.  He is unable to provide specific onset of this pain.  The patient states laying on his right side worsens his pain.  At sitting his pain is 2/10 with motion of the right shoulder area his pain worsens he describes the pain as sharp.  Pain is reduced with distracting his thoughts.  He is describes that he has been offered shoulder replacement surgery and has had shoulder joint steroid injections in the past with relief in pain.  He has not had any formal physical therapy for his right shoulder area.  He denies any numbness tingling or weakness in the right forearm and hand.  He denies any neck pain.    Tests/Imaging reviewed with the patient:    None available    Significant Medical History:   Past Medical History:   Diagnosis Date     AION (acute ischemic optic neuropathy)      Anemia in chronic renal disease      Avascular necrosis of bones of both hips (H)     s/p bilateral hip replacements     Basal cell carcinoma      Chronic hepatitis B (H)      Clostridium  difficile colitis      Coronary artery disease involving native coronary artery of native heart without angina pectoris 6/17/2014    Coronary angiogram 6/17/14: Severe distal 3-vessel disease involving small vessels, not amenable to PCI or CABG.     CRP elevated      Depression      Diverticulosis      Dyslipidemia      FSGS (focal segmental glomerulosclerosis)      Gastric ulcer with hemorrhage 2/12/12     GERD (gastroesophageal reflux disease)      Glaucoma     OHTN     Hemorrhoids      Hypertension secondary to other renal disorders      Hypogonadism in male      Immunosuppressed status (H)      Kidney replaced by transplant     focal glomerulosclerosis      NSTEMI (non-ST elevated myocardial infarction) (H) 6/17/2014     JULAINE (obstructive sleep apnea)     Doesn't use CPAP     Paracentral scotoma     LE     Secondary renal hyperparathyroidism (H)      Squamous cell carcinoma           Past Surgical History:  Past Surgical History:   Procedure Laterality Date     APPENDECTOMY       CATARACT IOL, RT/LT  4/19/2000    RE     CATARACT IOL, RT/LT  6/1/2000    LE     COLECTOMY SUBTOTAL  1983    10 cm, diverticulitis     COLONOSCOPY  2/13/2012    Procedure:COLONOSCOPY; Surgeon:SLOAN GALLARDO; Location: GI     ESOPHAGOSCOPY, GASTROSCOPY, DUODENOSCOPY (EGD), COMBINED  2/13/2012    Procedure:COMBINED ESOPHAGOSCOPY, GASTROSCOPY, DUODENOSCOPY (EGD); Surgeon:SLOAN GALLARDO; Location:[facility]     EXTRACAPSULAR CATARACT EXTRATION WITH INTRAOCULAR LENS IMPLANT  4-20-10, 6-1-10    Rt, Lt     HERNIA REPAIR  1995    Lt inguinal     HIP SURGERY      1981, bilat MITZI, revised 2001, 2005     KIDNEY SURGERY  1978 and 1993    transplant     KNEE SURGERY  1983, 1987    bilat TKA     MOHS MICROGRAPHIC PROCEDURE       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, auxiliary spleen     TONSILLECTOMY            Family History:  Family History   Problem Relation Age of Onset     Cardiovascular Father         AI with valve repair      Hypertension Father      Kidney Disease Father      Cancer Paternal Grandmother         ovarian ca     Cerebrovascular Disease Paternal Grandfather      Cerebrovascular Disease Maternal Grandfather      Kidney Disease Paternal Aunt      Skin Cancer No family hx of      Glaucoma No family hx of      Macular Degeneration No family hx of      Amblyopia No family hx of      Melanoma No family hx of           Social History:  Social History     Socioeconomic History     Marital status:      Spouse name: Not on file     Number of children: 0     Years of education: Not on file     Highest education level: Not on file   Occupational History     Employer: DISABLED     Occupation:      Employer: [employer]   Social Needs     Financial resource strain: Not on file     Food insecurity:     Worry: Not on file     Inability: Not on file     Transportation needs:     Medical: Not on file     Non-medical: Not on file   Tobacco Use     Smoking status: Former Smoker     Packs/day: 1.00     Years: 9.00     Pack years: 9.00     Last attempt to quit: 1988     Years since quittin.8     Smokeless tobacco: Former User   Substance and Sexual Activity     Alcohol use: Yes     Alcohol/week: 0.0 standard drinks     Comment: rarely 1 drink per month     Drug use: No     Sexual activity: Not on file   Lifestyle     Physical activity:     Days per week: Not on file     Minutes per session: Not on file     Stress: Not on file   Relationships     Social connections:     Talks on phone: Not on file     Gets together: Not on file     Attends Mosque service: Not on file     Active member of club or organization: Not on file     Attends meetings of clubs or organizations: Not on file     Relationship status: Not on file     Intimate partner violence:     Fear of current or ex partner: Not on file     Emotionally abused: Not on file     Physically abused: Not on file     Forced sexual activity: Not on file   Other  Topics Concern     Parent/sibling w/ CABG, MI or angioplasty before 65F 55M? Not Asked      Service Not Asked     Blood Transfusions Not Asked     Caffeine Concern Not Asked     Occupational Exposure Not Asked     Hobby Hazards Not Asked     Sleep Concern Not Asked     Stress Concern Not Asked     Weight Concern Not Asked     Special Diet No     Back Care Not Asked     Exercise Yes     Comment: walks     Bike Helmet Not Asked     Seat Belt Not Asked     Self-Exams Not Asked   Social History Narrative    . Wife is also a kidney transplant recipient.     Works part-time     Social History     Patient does not qualify to have social determinant information on file (likely too young).   Social History Narrative    . Wife is also a kidney transplant recipient.     Works part-time          Allergies:  Allergies   Allergen Reactions     Penicillins Shortness Of Breath and Hives     Keflex [Cephalexin Hcl] Other (See Comments)     Pt could not recall reaction     Tetracycline Other (See Comments)     Patient could not recall reaction     Sulfa Drugs Rash       Current Medications:   Current Outpatient Medications   Medication Sig Dispense Refill     acetaminophen (TYLENOL) 325 MG tablet Take 1-2 tablets by mouth every 6 hours as needed.       albuterol (PROAIR HFA) 108 (90 Base) MCG/ACT Inhaler Inhale 2 puffs into the lungs every 6 hours as needed for shortness of breath / dyspnea or wheezing 1 Inhaler 2     amLODIPine (NORVASC) 5 MG tablet Take 1 tablet (5 mg) by mouth daily 100 tablet 3     aspirin 81 MG tablet Take 81 mg by mouth daily        atorvastatin (LIPITOR) 20 MG tablet Take 1 tablet (20 mg) by mouth daily 100 tablet 3     budesonide (PULMICORT FLEXHALER) 90 MCG/ACT inhaler Inhale 2 puffs into the lungs 2 times daily 1 each 3     calcium citrate-vitamin D (CITRACAL) 315-200 MG-UNIT TABS Take 1 tablet by mouth daily.       clopidogrel (PLAVIX) 75 MG tablet Take 1 tablet (75 mg) by mouth daily  100 tablet 3     entecavir (BARACLUDE) 0.5 MG tablet Take 1 tablet (0.5 mg) by mouth daily 90 tablet 3     FLUoxetine (PROZAC) 10 MG capsule Take 1 capsule (10 mg) by mouth daily With 40mg daily for a total daily dose of 50mg 90 capsule 0     FLUoxetine (PROZAC) 40 MG capsule Take 1 capsule (40 mg) by mouth daily 90 capsule 0     isosorbide mononitrate (IMDUR) 30 MG 24 hr tablet Take 0.5 tablets (15 mg) by mouth daily Need appointment for further refills 50 tablet 3     latanoprost (XALATAN) 0.005 % ophthalmic solution Place 1 drop into both eyes At Bedtime Please keep 10-9-19 clinic appt with Dr. Rizzo, for further refills 2.5 mL 11     Multiple Vitamins-Iron (ONE DAILY MULTIVITAMIN/IRON) TABS Take 1 tablet by mouth daily       mycophenolate (GENERIC EQUIVALENT) 250 MG capsule TAKE 3 CAPSULES BY MOUTH TWICE DAILY 180 capsule 11     Omega-3 Fatty Acids (OMEGA 3 PO) Take 1 capsule by mouth daily Dose unknown       pantoprazole (PROTONIX) 40 MG EC tablet Take 1 tablet (40 mg) by mouth daily 90 tablet 3     predniSONE (DELTASONE) 5 MG tablet Take 5 mg by mouth daily. 90 tablet 3     BETA BLOCKER NOT PRESCRIBED, INTENTIONAL, Beta Blocker not prescribed intentionally due to Bradycardia < 50 bpm without beta blocker therapy (Patient not taking: Reported on 10/8/2019)  0     fluticasone (FLONASE) 50 MCG/ACT spray Spray 1-2 sprays into both nostrils daily (Patient not taking: Reported on 10/8/2019) 3 Bottle 11     fluticasone-salmeterol (ADVAIR) 100-50 MCG/DOSE inhaler Inhale 1 puff into the lungs every 12 hours (Patient not taking: Reported on 10/8/2019) 1 Inhaler 0     fluticasone-salmeterol (ADVAIR) 250-50 MCG/DOSE diskus inhaler Inhale 1 puff into the lungs every 12 hours (Patient not taking: Reported on 10/8/2019) 60 Inhaler 1          Current Pain Medications:  Medications related to Pain Management (From now, onward)    None           Past Pain Medications:  He has tried medicines like codeine in the past for  "shoulder pain and does not want to be on any stronger pain meds at this point for his chronic joint pains    Blood thinner:    Plavix      Psychosocial History:     History of treatment for behavioral disorder: The patient follow-ups with psychiatry at our institution    Review of Systems:  Review of Systems   HENT: Negative for ear discharge, ear pain, hearing loss, nosebleeds, sinus pain, sore throat and tinnitus.    Respiratory: Positive for cough, shortness of breath and wheezing. Negative for hemoptysis and sputum production.    Cardiovascular: Positive for chest pain. Negative for palpitations and orthopnea.   Gastrointestinal: Positive for abdominal pain, constipation and heartburn. Negative for blood in stool, diarrhea, melena, nausea and vomiting.   Musculoskeletal: Positive for back pain and myalgias. Negative for neck pain.   Psychiatric/Behavioral: Positive for depression. The patient is nervous/anxious and has insomnia.          Physical Exam:     Vitals:    10/28/19 1134   BP: 121/78   Pulse: 76   Resp: 16   SpO2: 97%   Weight: 79.4 kg (175 lb)   Height: 1.702 m (5' 7\")       General Appearance: No distress, seated comfortably  Mood: Euthymic  HE ENT: Non constricted pupils  Respiratory: Non labored breathing  CVS: Regular rate and rhythm, bilateral radial pulses are palpable  GI: Soft, non distended, no TTP  Skin: No rashes over exposed skin, multiple papules noted all over the skin.  Well-healed surgical scar noted over the left distal radius area  MS: Right shoulder tender to palpation at the suprascapular notch, AC joint is also tender to palpation his range of motion of the right shoulder is limited to 90 degrees abduction but is able to complete the arc with  assistance follow-up  Neuro: Bilateral upper extremities intact to light touch motor strength is grossly equal bilaterally  Lymph nodes no palpable cervical lymph nodes at this time      Laboratory results:  Recent Labs   Lab Test " 09/20/19  1137 04/02/19  0912    134   POTASSIUM 3.4 3.8   CHLORIDE 106 102   CO2 26 26   ANIONGAP 6 6   GLC 97 80   BUN 11 12   CR 0.75 0.74   HEMA 9.0 9.6       CBC RESULTS:   Recent Labs   Lab Test 09/27/19  1147 09/20/19  1137   WBC 11.2* 11.9*   RBC  --  4.80   HGB  --  13.9   HCT  --  43.3   MCV  --  90   MCH  --  29.0   MCHC  --  32.1   RDW  --  14.6   PLT  --  345         Imaging:       ASSESSMENT AND PLAN:     Encounter Diagnosis:    #1 right shoulder joint pain   #2 osteoarthritis right shoulder area  #3 on anticoagulation with Plavix  #4 history of renal transplant    Jerad Ross is a 59 year old y.o. old male who presents to the pain clinic with right shoulder pain    Patient's history, physical exam and imaging are suggestive of their pain is resulting from severe right shoulder osteoarthritis.     I have summarized the patient history, discussed their clinical findings and differential diagnosis with the patient. I have discussed anatomy and possible sources of the pain using models and/or pictures(diagrams). I have discussed multi- disciplinary pain management options with the patient as pertaining to their case as detailed above. All questions and concerns were answered to the best of my ability.     RECOMMENDATIONS:     1. Medications: The patient was curious about ongoing opioids although he states that he is not interested in continuing these medications.  We discussed that multiple research studies have demonstrated that long term narcotic therapy for chronic non malignant pain is not efficacious. Moreover, its associated with many side effects such as opioid-induced hyperalgesia, endocrine imbalance, central sleep apnea and overdose death. In this setting, we advised the patient to wean and discontinue use of his opioid medications.      2. Procedure: We are scheduling the patient for right shoulder joint steroid injection with fluoroscopy in the procedure suite.  Risks/benefits/alternatives were discussed.     The possible risks involved with inerventional treatment are as following but not limited to- no pain control, worsened pain, stroke, seizure, spinal headache, allergic reactions, introduction of infection or bleeding which may lead to emergent spine surgery, nerve damage, paralysis or even death.    3. Physical therapy: I have refered the patient for outpatient physical therapy for stretching, strengthening and home exercise program for right shoulder pain. The patient will also discuss spine care and posture. Pool therapy and stretches can be considered if available.    4.  Patient was interested about chronic pain I have shared with him the YouTube video about understanding pain in less than 5 minutes with him today in the clinic.  The patient is very appreciative.  The patient is also interested in biofeedback as well and referral was provided to him for pain psychology as well. In the future suprascapular nerve block can be considered.     Follow up: 4 weeks after completion of shoulder joint steroid injections    Again, thank you for allowing me to participate in the care of your patient.      Sincerely,    Emi Nava MD

## 2019-10-28 NOTE — PATIENT INSTRUCTIONS
1. Right shoulder physical Therapy ordered-   If you have not heard from the scheduling office within 2 business days, please call [phone] for all locations.     2. Psychology Referral placed-     To make an appointment for health psychology, call any of these providers to make an appointment:    Pain psychology Therapy Requested- Biofeedback    Dr. Eduardo Hampton [phone]  Dr. Joaquina Arellano [phone]  Dr. Marcella Asher [phone]  Dr. Sujatha Donald [phone]  Dr. Naomi Goff [phone]    3. Call to schedule your procedure- Right shoulder joint steroid injection with fluoroscopy        Follow up: 4 weeks after injection.       When calling to schedule your procedure appointment, also make your follow up clinic appointment 4 weeks after the procedure.    Please call [phone] to schedule, reschedule, or cancel your procedure appointment.   Phones are answered Monday - Friday from 7:30 - 4:00pm.  Leave a voicemail with your name, birth date, and phone number if no one is available to take your call.     Your procedure: Right shoulder joint steroid injection with fluoroscopy    On the day of the procedure  1. Arrive 1 hour earlier than your scheduled time, to the [facility]  Address: [address] [place] [id number]  2. Check in on the 5th floor for your procedure    If you must reschedule your procedure more than two times, you must follow up in clinic before rescheduling again.    Preparing for your procedure    CAUTION - FAILURE TO FOLLOW THESE PRE-PROCEDURE INSTRUCTIONS WILL RESULT IN YOUR PROCEDURE BEING RESCHEDULED.    Dr. Nava is agreeable to you continuing your Plavix. You do not need to hold this medication for this injection.            You must have a  take you home after your procedure. Transportation by taxi or para-transit is okay as long as you have a responsible adult accompany you. You must provide your 's full name and contact number at  time of check in.     Fasting Protocol You may have NOTHING SOLID TO EAT 8 HOURS prior to arrival at the procedure area.     You may have CLEAR LIQUIDS UP TO 2 HOURS prior to arrival.    Broth and candy are considered solid food and require an eight hour fast.     Clear liquids include water, clear fruit juice (no pulp), carbonated beverages, ice, black coffee, black tea, clear jello. No alcohol containing beverages.   Medications If you take any medications, DO NOT STOP. Take your medications as usual the day of your procedure with a sip of water AT LEAST 2 HOURS PRIOR TO ARRIVAL.    Antibiotics If you are currently taking antibiotics, you must complete the entire dose 7 days prior to your scheduled procedure. You must be clear of any signs or symptoms of infection. If you begin antibiotics, please contact our clinic for instructions.     Fever, Chills, or Rash If you experience a fever of higher than 100 degrees, chills, rash, or open wounds during the one week before your procedure, please call the clinic to see if you may proceed with your procedure.      Medication Hold List  **Patients under Cardiology/Neurology care should consult their provider prior to the pain procedure to verify pre-procedure medication instructions. The information below contains general guidelines.**    Blood Thinners If you are taking daily ASPIRIN, PLAVIX, OR OTHER BLOOD THINNERS SUCH AS COUMADIN/WARFARIN, we will need your prescribing doctor to sign a release permitting you to stop these medications. Once approved by your prescribing doctor - STOP ALL BLOOD THINNERS BASED ON THE TIME TABLE BELOW PRIOR TO YOUR PROCEDURE. If you have been instructed to stop WARFARIN(COUMADIN), you must have an INR lab drawn the day before your procedure. . Your INR must be within normal limits before we can perform your injection. MEDICATIONS CAN BE RESTARTED AFTER YOUR PROCEDURE.    14 DAY HOLD  Ticlid (ticlopidine)    10 DAY HOLD  Effient  (Prasugel)    3 DAY HOLD  Xarelto (rivaroxaban) 7 DAY HOLD  Anacin, Bufferin, Ecotrin, Excedrin, Aggrenox (Aspirin)  Brilinta (ticagrelor)  Coumadin (Warfarin)  Pradexa (Dabigatran)  Elmiron (Pentosan)  Plavix (Clopidogrel Bisulfate)  Pletal (Cilostazol)    24 HOUR HOLD  Lovenox (enoxaparin)  Agrylin (Anagrelide)        Non-steroidal Anti-inflammatories (NSAIDs) DO NOT TAKE any non-steroidal anti-inflammatory medications (NSAIDs) listed on the table below. MEDICATIONS CAN BE RESTARTED AFTER YOUR PROCEDURE. Celebrex is OK to take and does not need to be discontinued.     Medications to stop:  3 DAY HOLD  Advil, Motrin (Ibuprofen)  Arthrotec (diciofenac sodium/misoprostol)  Clinoril (Sulindac)  Indocin (Indomethacin)  Lodine (Etodolac)  Toradol (Ketorolac)  Vicoprofen (Hydrocodone and Ibuprofen)  Voltaren (Diclotenac)    14 DAY HOLD  Daypro (Oxaprozin)  Feldene (Piroxicam)   7 DAY HOLD  Aleve (Naproxen sodium)  Darvon compound (contains aspirin)  Naprosyn (Naproxen)  Norgesic Forte (contains aspirin)  Mobic (Meloxicam)  Oruvall (Ketoprofen)  Percodan (contains aspirin)  Relafen (Nabumetone)  Salsalate  Trilisate  Vitamin E (more than 400 mg per day)  Any medication containing aspirin                To speak with a nurse, schedule/reschedule/cancel a clinic appointment, or request a medication refill call: [phone]     You can also reach us by [email or web address]: [email or web address]

## 2019-10-29 ENCOUNTER — OFFICE VISIT (OUTPATIENT)
Dept: OTHER | Facility: OUTPATIENT CENTER | Age: 59
End: 2019-10-29
Payer: MEDICARE

## 2019-10-29 DIAGNOSIS — F91.8 CONDUCT DISORDER, UNDIFFERENTIATED TYPE: ICD-10-CM

## 2019-10-29 DIAGNOSIS — F33.1 MODERATE EPISODE OF RECURRENT MAJOR DEPRESSIVE DISORDER (H): Primary | ICD-10-CM

## 2019-10-29 ASSESSMENT — ANXIETY QUESTIONNAIRES: GAD7 TOTAL SCORE: 6

## 2019-10-29 NOTE — PROGRESS NOTES
[facility]      Program in Human Sexuality  Department of Family Medicine & Community Health  [facility]   [address]               [place] [address]  Phone: [phone]  Fax: [phone]  [email or web address]      General Diagnostic Assessment Interview      Note: All information described in this report has been directly reported by the patient in the session(s) (unless specifically noted) except for Strengths & Liabilities, Mental Status, Multi-Axial Diagnosis, and Conclusions/Recommendations/Initial Treatment Goals.     Limits of confidentiality were reviewed with the client. He verbally acknowledged understanding these limits.        Client Name: Jerad Ross  YOB: 1960  59 year old  MRN:  [id number]  Gender/Gender Identity: male/male  Treating Provider: Julita Wisdom PsyD, LP  Program:  [facility]  Type of Session: Assessment  Present in Session: client  Number of Minutes:  55    Date of Service: 10/29/19    Ethnicity:    Any relevant cultural/Hindu issues? None       Referral Source self-referred    Chief Complaint/Presenting Problem and Goals:  History of Presenting Problem/Illness:    Client presents with a history of sexual acting out behavior that he has had difficulty maintaining boundaries around.  He shared a pattern of looking at violent sexual pornography for many years.  He shared promising himself that he would discontinue and then it would happen again.  He did not understand why he had so much difficulty in stopping this behavior.  He shared working to hide this behavior from his wife.  He indicated at times he would look at pornography for up to 6 hours at a time.  He also went to [employer] but stated he has not done that during his marriage.  He engaged in this behavior when his wife was not around.  He stated his wife often confronts him for flirtatious behavior when he is  around women.  He shared having sexual fantasy frequently throughout the day and having difficulty stopping himself from thinking about sex.  His sexual behavior is cause significant stress and conflict in his relationship.  He shared experiencing a great deal of shame and internal conflict about continually engaging in this behavior.  He indicated that about 6 months ago he was at a family gathering where shame was triggered.  The next day he looked at pornography and then became suicidal.  He talked with his psychiatrist who asked him to remove the firearms from his house and it was recommended to him to go to inpatient treatment at the [facility].    Mental Health History:   He indicated he started with outpatient treatment for sexual addiction at [facility] in September.  He is engaging in both group and individual therapy.  His psychiatrist is Genia Galvan who is seen for 23 years.  His current medication is fluoxetine 50 mg daily.  He did attend some programming at the [facility] in [place].  He shared being in various psychotherapy for 20 years.  He indicated a suicide attempt when he was 16 like taking a high dose of blood pressure medication.  He was in the hospital briefly but this was primarily addressed through his medical treatment.     Substance Use: Client indicated making decision to discontinue use of alcohol.  He does not use any mood altering drugs.  He shared having concern about his alcohol use.  He indicated drinking 2-4 drinks but it was not clear what the frequency was.    Medical History: Client indicated developing kidney disease at a very young age.  He shared being in the hospital and was limited in the activities he could do.  He shared the first 1 lasted has had 2 kidney transplants.  13 years.  His second transplant was in 1993.  Indicated a higher level of depression since his last transplant.  Because of the long-term effects of kidney disease he has had other medical problems.  He shared  he has had both knees and both hips replaced.  He said he has some level of necrosis.  He shared that he lives with pain, recently started treatment at a pain clinic.  He shared having a mild heart attack a few years ago.  He has ischemic optic neuropathy.  He had to give up his 's license because of poor eyesight.  He is on numerous medications to address these medical issues.    Relationships/Social History  Family History:     Client was reared by his biological parents in [facility].  He has 1 sister and 1 brother that are both younger than he is.  He described his mom as being insult of the organs, present, and relationships are important to her.  He shared his father had high expectations.  His father was Restorationist and the physician.  His father would frequently expressed anger about the holocaust.  His father  5 years ago at the age of 85.  His mother is 84 and lives with his sister in [place].  He indicated some possible narcotic addiction with his paternal grandfather.  He shared his father yelling could be described as verbal abuse.  He denied any sexual or physical abuse while he was growing up.    He has been  to his wife Yodit for 26 years.  They met during her teenage years as both of them struggle with kidney disease.  Over many years they built letters and at some point when they got together they decided to get .  He shared they had a comment history which was 1 of the things that connecting them.  He shared currently living in a sober house since he returned from the [facility].  He has had very little interaction with his wife since being gone.  He has called her and sent her messages with little response.  He shared that he sees her and has being angry about his depression and sexual addiction.  He shared being fearful of taking risks with her because she may expressed anger.  He is questioning staying in the relationship.  They do not have any children.                         He is      Educational History     He has a masters degree in health administration.    Occupational history     He currently works part-time as an .  In the past he was a healthcare .  He does received Social Security disability income.    Legal Issues     There are no arrests or convictions.        CONCLUSIONS    Strengths and Liabilities:     He described his strengths as social fluidity.  His limitations are over thinking things.      Interactive Complexity  Communication difficulties present during current the psychiatric procedure included:  1.  N/A          Symptoms:      Mental Status:   Appearance:  Casually dressed and Well groomed  Behavior/relationship to examiner/demeanor:  Cooperative and Engaged  Orientation: Oriented to person, place, time and situation  Speech Rate:  Normal  Speech Spontaneity:  Normal  Mood:  depressed  Affect:  Appropriate/mood-congruent  Thought Process (Associations):  Logical  Thought Content:  no evidence of suicidal or homicidal ideation and no overt psychosis  Abnormal Perception:  None  Attention/Concentration:  Normal  Language:  Intact  Insight:  Good  Judgment:  Good    Motor activity/EPS:  Normal      DSM-5 Diagnosis:    312.89 F 91.8 Other Specified Disruptive, Impulse-Control, and Conduct Disorder (hypersexuality)  296.32     Major Depression, chronic,moderate     Conclusions/Recommendations/Initial Treatment Goals:    Mr. Jerad Ross is a 59 year old ,  person assigned male at birth who identifies as male who [facility] the [facility] with concerns about difficulty maintaining long-term abstinence from compulsive sexual behavior.  He is currently in outpatient treatment at a 12-step program.  He is looking for individual therapy to help him address issues underlying his sexual behavior.  He also hopes to gain insight and understanding of his difficulty in his  marriage relationship.  He wants to determine if it is good for him to continue the relationship or not.  He has a long-term medical issues from living with kidney disease and has had 2 different transplants.  Living with this illness seems to be 1 of the underlying issues connected to his depression.  Individual therapy is recommended to address depression, underlying long-term grief issues, and marital relationship.  Symptoms as    Signature/Title    [Therapist to Insert personalized signature smart link/ dot phrase]  Granddaughter's

## 2019-11-06 ENCOUNTER — OFFICE VISIT (OUTPATIENT)
Dept: OTHER | Facility: OUTPATIENT CENTER | Age: 59
End: 2019-11-06
Payer: MEDICARE

## 2019-11-06 DIAGNOSIS — F91.8 CONDUCT DISORDER, UNDIFFERENTIATED TYPE: Primary | ICD-10-CM

## 2019-11-06 DIAGNOSIS — F33.1 MODERATE EPISODE OF RECURRENT MAJOR DEPRESSIVE DISORDER (H): ICD-10-CM

## 2019-11-07 NOTE — PROGRESS NOTES
[facility] -  Case Progress Note      Client Name: Jerad Ross  YOB: 1960  MRN:  [id number]  Treating Provider: Julita Wisdom LP  Type of Session: Individual  Present in Session: client  Number of Minutes:  55    Date of Service:11/06/19    Current Symptoms/Status:  Thoughts and urges to act out sexually, no boundary violations, depression, worry, sadness, rumination, low self-esteem, distressing conflict in the marital relationship.    Progress Toward Treatment Goals:   First session post diagnostic.  Started working on treatment plan.    Intervention: Modality and Description:  Provided support and feedback.  Used CBT to process the diagnostic session and discuss treatment goals.  Reviewed with client that he is attending outpatient treatment at [facility] a 12-step recovery program.  He shared that he is happy with this program but not his individual therapy.  We discussed the situation with his relationship.  He shared that he has seen her occasionally at [employer] and she may have to say hi but they do not really interact.  He shared that he has tried to contact her and offered to go out for coffee or lunch to talk.  He shared he does not get responses from her about that.  We discussed more about the history of the relationship.  He shared believing that she has been quite angry about his pattern of addictive behavior.  She also talked about his flirtatious behavior with other women that he did not always recognize.  We discussed how they got together.        Response to Intervention:  Client reported gaining insight and validation.    Assignment:  Reflect on what he wants from a relationship.    Interactive Complexity:  There are four specific communication difficulties that complicate the work of the primary psychiatric procedure.  Interactive complexity (+56671) may be reported when at least one of these difficulties is present.    Communication difficulties present  during current the psychiatric procedure include:  1. None.    Diagnosis:  Encounter Diagnoses   Name Primary?     Other Specified Disruptive, Impulse-Control, and Conduct Disorder (hypersexuality) Yes     Moderate episode of recurrent major depressive disorder (H)          Plan / Need for Future Services:  Return for therapy in 2 weeks.      Julita Wisdom Psyd,  LP

## 2019-11-12 ENCOUNTER — TRANSFERRED RECORDS (OUTPATIENT)
Dept: HEALTH INFORMATION MANAGEMENT | Facility: CLINIC | Age: 59
End: 2019-11-12

## 2019-11-18 ASSESSMENT — ANXIETY QUESTIONNAIRES
GAD7 TOTAL SCORE: 4
3. WORRYING TOO MUCH ABOUT DIFFERENT THINGS: SEVERAL DAYS
2. NOT BEING ABLE TO STOP OR CONTROL WORRYING: SEVERAL DAYS
6. BECOMING EASILY ANNOYED OR IRRITABLE: NOT AT ALL
1. FEELING NERVOUS, ANXIOUS, OR ON EDGE: NOT AT ALL
7. FEELING AFRAID AS IF SOMETHING AWFUL MIGHT HAPPEN: NOT AT ALL
5. BEING SO RESTLESS THAT IT IS HARD TO SIT STILL: SEVERAL DAYS

## 2019-11-18 ASSESSMENT — PATIENT HEALTH QUESTIONNAIRE - PHQ9
5. POOR APPETITE OR OVEREATING: SEVERAL DAYS
SUM OF ALL RESPONSES TO PHQ QUESTIONS 1-9: 6

## 2019-11-19 ENCOUNTER — DOCUMENTATION ONLY (OUTPATIENT)
Dept: CARE COORDINATION | Facility: CLINIC | Age: 59
End: 2019-11-19

## 2019-11-19 ASSESSMENT — ANXIETY QUESTIONNAIRES: GAD7 TOTAL SCORE: 4

## 2019-11-21 ENCOUNTER — OFFICE VISIT (OUTPATIENT)
Dept: OTHER | Facility: OUTPATIENT CENTER | Age: 59
End: 2019-11-21
Payer: MEDICARE

## 2019-11-21 DIAGNOSIS — F91.8 CONDUCT DISORDER, UNDIFFERENTIATED TYPE: Primary | ICD-10-CM

## 2019-11-21 DIAGNOSIS — F33.1 MODERATE EPISODE OF RECURRENT MAJOR DEPRESSIVE DISORDER (H): ICD-10-CM

## 2019-11-22 NOTE — PROGRESS NOTES
[facility] -  Case Progress Note      Client Name: Jerad Ross  YOB: 1960  MRN:  [id number]  Treating Provider: Julita Wisdom LP  Type of Session: Individual  Present in Session: client  Number of Minutes:  55    Date of Service:11/21/19    Current Symptoms/Status:  Thoughts and urges to act out sexually, depression, low energy, anxiety and distress with interacting with his wife, fear of talking with her, financial distress, and relationship conflict.    Progress Toward Treatment Goals:   Completed treatment plan.    Intervention: Modality and Description:  Provided support and feedback.  Used CBT to process thoughts and urges and distress in his marital relationship.  He shared feeling quite anxious now about her recent interaction with her.  He had spent some money to pay bills and then took money out of the account to pay off the credit card that he had use.  She texted him and shared being upset about there not being much money in the account.  He sent her text back explaining why and how this happened.  He shared she responded with telling him she does not trust him, feels like this is a minute manipulation, and part of his pattern of being dishonest.  He shared that he has crafted a response to her going through some of the finances and his decisions and has reviewed it with a couple of people.  Reviewed the information with me and we talked about her concern about him being mistrustful around money was not addressed in his response.  We talked about to work on the relationship he needs to start with acknowledging her mistrust, wanting to discuss and work out a plan regarding the finances with her.  He shared this felt very helpful and so he will do it and we can see how she responds.    Response to Intervention:  Client reported gaining insight and validation.    Assignment:  Reflect on what he wants from a relationship.    Interactive Complexity:  There are four  specific communication difficulties that complicate the work of the primary psychiatric procedure.  Interactive complexity (+05891) may be reported when at least one of these difficulties is present.    Communication difficulties present during current the psychiatric procedure include:  1. None.    Diagnosis:  Encounter Diagnoses   Name Primary?     Other Specified Disruptive, Impulse-Control, and Conduct Disorder (hypersexuality) Yes     Moderate episode of recurrent major depressive disorder (H)          Plan / Need for Future Services:  Return for therapy in 2 weeks.      Julita Wisdom Psyd,  LP

## 2019-12-10 DIAGNOSIS — J45.909 ASTHMA: ICD-10-CM

## 2019-12-18 ENCOUNTER — OFFICE VISIT (OUTPATIENT)
Dept: OTHER | Facility: OUTPATIENT CENTER | Age: 59
End: 2019-12-18
Payer: MEDICARE

## 2019-12-18 DIAGNOSIS — F91.8 CONDUCT DISORDER, UNDIFFERENTIATED TYPE: Primary | ICD-10-CM

## 2019-12-18 DIAGNOSIS — F33.1 MODERATE EPISODE OF RECURRENT MAJOR DEPRESSIVE DISORDER (H): ICD-10-CM

## 2019-12-20 NOTE — PROGRESS NOTES
[facility] -  Case Progress Note      Client Name: Jerad Ross  YOB: 1960  MRN:  [id number]  Treating Provider: Julita Wisdom LP  Type of Session: Individual  Present in Session: client  Number of Minutes:  55    Date of Service:12/18/19    Current Symptoms/Status:  Thoughts and urges to act out sexually, conflict and tension with his wife due to his past sexual behavior and the impact on the relationship, depression, low energy, anxiety and distress with interacting with his wife, fear of talking with her, financial distress, and relationship conflict.    Progress Toward Treatment Goals:   Client reported making progress with taking her risk and sending a proposal regarding finances to his wife.    Intervention: Modality and Description:  Provided support and feedback.  Used CBT to process thoughts and urges and distress in his marital relationship.  He shared he did follow guidelines and how he approached her regarding the finances.  He shared he felt good he did a better job of acknowledging her sense of mistrust.  He shared it is always difficult for him to approach her and talk about issues given his sense that she is always angry.  He shared he has had this since about her even before he engaged in sexual acting out behavior.  We processed the tendency then to avoid or prevent an anger response when that is not actually possible.  The goal is to work at being honest and respectful compassionate way and learning to tolerate her feelings and his own.  He shared sending her a financial plan that she rejected.  He is waiting for her to come up with a proposal.  He shared that he thought his plan was really fair and reasonable.  We talked about that the factual information might have been but we do not necessarily fully understand her interpretation of what is going on.  Discussed she may be feeling fearful and insecure about money.  This could be also her sense of another  loss she has to experience because of his behavior.  He shared this made sense to him.  We discussed how he is coping with urges.  He is not having any boundary violations.  We talked about a framework for the goals that he has right now.  Discussed it is important for him to continue reflecting on what he wants and needs from a relationship and also to consider her experience and perspectives.    Response to Intervention:  Client reported gaining insight and validation.    Assignment:  Reflect on what he wants from a relationship.    Interactive Complexity:  There are four specific communication difficulties that complicate the work of the primary psychiatric procedure.  Interactive complexity (+69632) may be reported when at least one of these difficulties is present.    Communication difficulties present during current the psychiatric procedure include:  1. None.    Diagnosis:  Encounter Diagnoses   Name Primary?     Other Specified Disruptive, Impulse-Control, and Conduct Disorder (hypersexuality) Yes     Moderate episode of recurrent major depressive disorder (H)          Plan / Need for Future Services:  Return for therapy in 2 weeks.      Julita Wisdom Psyd,  LP

## 2019-12-23 ENCOUNTER — OFFICE VISIT (OUTPATIENT)
Dept: INTERNAL MEDICINE | Facility: CLINIC | Age: 59
End: 2019-12-23
Payer: MEDICARE

## 2019-12-23 ENCOUNTER — NURSE TRIAGE (OUTPATIENT)
Dept: NURSING | Facility: CLINIC | Age: 59
End: 2019-12-23

## 2019-12-23 ENCOUNTER — TELEPHONE (OUTPATIENT)
Dept: INTERNAL MEDICINE | Facility: CLINIC | Age: 59
End: 2019-12-23

## 2019-12-23 VITALS — WEIGHT: 178 LBS | OXYGEN SATURATION: 96 % | HEIGHT: 67 IN | RESPIRATION RATE: 16 BRPM | BODY MASS INDEX: 27.94 KG/M2

## 2019-12-23 DIAGNOSIS — Z94.0 KIDNEY REPLACED BY TRANSPLANT: ICD-10-CM

## 2019-12-23 DIAGNOSIS — R42 DIZZINESS: ICD-10-CM

## 2019-12-23 DIAGNOSIS — R42 DIZZINESS: Primary | ICD-10-CM

## 2019-12-23 DIAGNOSIS — Z79.899 ENCOUNTER FOR LONG-TERM CURRENT USE OF MEDICATION: ICD-10-CM

## 2019-12-23 DIAGNOSIS — Z48.298 AFTERCARE FOLLOWING ORGAN TRANSPLANT: ICD-10-CM

## 2019-12-23 LAB
ANION GAP SERPL CALCULATED.3IONS-SCNC: 4 MMOL/L (ref 3–14)
BUN SERPL-MCNC: 13 MG/DL (ref 7–30)
CALCIUM SERPL-MCNC: 9 MG/DL (ref 8.5–10.1)
CHLORIDE SERPL-SCNC: 105 MMOL/L (ref 94–109)
CO2 SERPL-SCNC: 28 MMOL/L (ref 20–32)
CREAT SERPL-MCNC: 0.82 MG/DL (ref 0.66–1.25)
CRP SERPL-MCNC: 4.9 MG/L (ref 0–8)
ERYTHROCYTE [DISTWIDTH] IN BLOOD BY AUTOMATED COUNT: 15.1 % (ref 10–15)
ERYTHROCYTE [SEDIMENTATION RATE] IN BLOOD BY WESTERGREN METHOD: 10 MM/H (ref 0–20)
GFR SERPL CREATININE-BSD FRML MDRD: >90 ML/MIN/{1.73_M2}
GLUCOSE SERPL-MCNC: 104 MG/DL (ref 70–99)
HCT VFR BLD AUTO: 48.6 % (ref 40–53)
HGB BLD-MCNC: 15.4 G/DL (ref 13.3–17.7)
MCH RBC QN AUTO: 29 PG (ref 26.5–33)
MCHC RBC AUTO-ENTMCNC: 31.7 G/DL (ref 31.5–36.5)
MCV RBC AUTO: 92 FL (ref 78–100)
PLATELET # BLD AUTO: 307 10E9/L (ref 150–450)
POTASSIUM SERPL-SCNC: 3.3 MMOL/L (ref 3.4–5.3)
RBC # BLD AUTO: 5.31 10E12/L (ref 4.4–5.9)
SODIUM SERPL-SCNC: 138 MMOL/L (ref 133–144)
WBC # BLD AUTO: 8.8 10E9/L (ref 4–11)

## 2019-12-23 ASSESSMENT — PAIN SCALES - GENERAL: PAINLEVEL: NO PAIN (0)

## 2019-12-23 ASSESSMENT — MIFFLIN-ST. JEOR: SCORE: 1591.03

## 2019-12-23 NOTE — TELEPHONE ENCOUNTER
"Warm transfer from call center  Jerad reports for the last 3 days he has noticed his heart skipping beats, has felt dizzy/lightheaded, a sense of not well being, no energy.  Denies feeling anxious, shortness of breath, chest pain/pressure. He is able to check his heart rate which reports he thinks is about 80.    Spoke with Jada at [facility], able to be seen by Dr Nguyen this afternoon at 2pm.  Advised Jerad of appointment time of 2 pm, with arrival time of 1:45.  Advised would be best for him not to drive, he will get a \"lift\"  Advised if symptoms worsen he will need to call 911 and go to ED, he agrees with plan      Additional Information    Commented on: Dizziness, lightheadedness, or weakness     Experiencing lightheadedness/dizziness/weakness    Protocols used: HEART RATE AND HEARTBEAT [id number]      "

## 2019-12-23 NOTE — TELEPHONE ENCOUNTER
Dizziness. Skip beats , about 80.  Right wrist.   Weak . No fainting, just doesn't feel well   No N/V no SOB no chest pain  Advised he should be seen, scheduled with Dr Nguyen.   Today at 2pm Mel Luna Paramedic on 12/23/2019 at 12:11 PM

## 2019-12-23 NOTE — NURSING NOTE
Chief Complaint   Patient presents with     Dizziness     pt. states that he has been having dizziness on and off for the pas couple of weeks     Fatigue     pt. states that he has been feeling fatigued on and off for tthe past couple of weeks     Heart Problem     pt. states that he has been having heart palpatations        An Alberts, EMT

## 2019-12-23 NOTE — PROGRESS NOTES
"                     PRIMARY [facility]ARE CENTER       SUBJECTIVE:  Jerad Ross is a 59 year old male with a PMHx of   Past Medical History:   Diagnosis Date     AION (acute ischemic optic neuropathy)      Anemia in chronic renal disease      Avascular necrosis of bones of both hips (H)     s/p bilateral hip replacements     Basal cell carcinoma      Chronic hepatitis B (H)      Clostridium difficile colitis      Coronary artery disease involving native coronary artery of native heart without angina pectoris 6/17/2014    Coronary angiogram 6/17/14: Severe distal 3-vessel disease involving small vessels, not amenable to PCI or CABG.     CRP elevated      Depression      Diverticulosis      Dyslipidemia      FSGS (focal segmental glomerulosclerosis)      Gastric ulcer with hemorrhage 2/12/12     GERD (gastroesophageal reflux disease)      Glaucoma     OHTN     Hemorrhoids      Hypertension secondary to other renal disorders      Hypogonadism in male      Immunosuppressed status (H)      Kidney replaced by transplant     focal glomerulosclerosis      NSTEMI (non-ST elevated myocardial infarction) (H) 6/17/2014     JULIANE (obstructive sleep apnea)     Doesn't use CPAP     Paracentral scotoma     LE     Secondary renal hyperparathyroidism (H)      Squamous cell carcinoma     who comes in for  Several non specific issues :  Feeling \"tired \" lack of strength in his limbs ,feeling \"queasy\" and a poor \"sense of well being\". He specifically denies  Vertigo,poor sleep,chest pain or dyspnea. He similarly denies poor appetite, URI sx, myalgia, or fevers.  He does mention  Possible palpitations but no diaphoresis.    as noted in PMH he is a long time renal Tx survivor and has been on immunosuppressive meds for many years.    Medications and allergies reviewed by me today.     ROS:    complete ROS  Except for above was neg except for  A sense hat he was -at times - Unstable  When  he moved to his  [facility] but no " "falls.    OBJECTIVE:    Resp 16   Ht 1.702 m (5' 7\")   Wt 80.7 kg (178 lb)   SpO2 96%   BMI 27.88 kg/m     Wt Readings from Last 1 Encounters:   12/23/19 80.7 kg (178 lb)    /86   thin man with multiple raised warty  skin lesions on face torso  Arms   Many  are hyperkeratotic  His history is clear  He rises to exam table without assistance  His gait is wide based with slight foot drop on his R  HEENT pupls equal EOM intact  Sclera clear  Conjunctiva  Not injected  Ear canals ceruminous ,pharynx benign  Neck supple thyroid not palpable   Thorax /lungs  Pendulous fatty breast ntissue  No CVAT lungs clear to P&A   Heart regular no gallop/murmur   ABD BS prsent  Healed old surgical scars   No gadding or rebound no masses palpated     Ext no edema or joint inflammation neuro non focal    ASSESSMENT/PLAN:    Jerad was seen today for dizziness, fatigue and heart problem.    Diagnoses and all orders for this visit:    Dizziness  -     EKG 12-lead, tracing only  -     CBC with platelets; Future  -     Basic metabolic panel; Future  -     CRP inflammation; Future  -     Erythrocyte sedimentation rate; Future       Labs reveal slight hypokalemia he was not fasting so glucose  up CRP wnl  actually better than the past    His EKG was  Without acute chages and I was unconvinced that it was significantly different from prior.  He was to have an ECHO and stress study per his primary and Dr Orantes  I will  cont ct them to re order  Pt was called with results will see geoffrey in two  Weeks. I had not seen him before but he does appear chronically ill.      Pt should return to clinic for f/u with me in ** two weeks      Artemio Nguyen MD  Dec 23, 2019      "

## 2019-12-23 NOTE — PATIENT INSTRUCTIONS
Primary Care Center Medication Refill Request Information:  * Please contact your pharmacy regarding ANY request for medication refills.  ** [facility] Prescription Fax = [phone]  * Please allow 3 business days for routine medication refills.  * Please allow 5 business days for controlled substance medication refills.     [facility] Test Result notification information:  *You will be notified with in 7-10 days of your appointment day regarding the results of your test.  If you are on MyChart you will be notified as soon as the provider has reviewed the results and signed off on them.

## 2019-12-31 ENCOUNTER — TELEPHONE (OUTPATIENT)
Dept: ANESTHESIOLOGY | Facility: CLINIC | Age: 59
End: 2019-12-31

## 2019-12-31 NOTE — TELEPHONE ENCOUNTER
[facility] Call Center    Phone Message    May a detailed message be left on voicemail: yes    Reason for Call: Other: Emerald, with [facility], called in and stated that she is needing the Plan of Care faxed back to her. She stated they faxed it to the clinic for provider on 11/14, 11/21, 11/27, 12/5, 12/11. Please follow up when available. Thank you      Action Taken: Message routed to:  [facility] ([facility] Pain

## 2020-01-06 ENCOUNTER — OFFICE VISIT (OUTPATIENT)
Dept: INTERNAL MEDICINE | Facility: CLINIC | Age: 60
End: 2020-01-06
Payer: MEDICARE

## 2020-01-06 VITALS
HEART RATE: 79 BPM | BODY MASS INDEX: 28.35 KG/M2 | DIASTOLIC BLOOD PRESSURE: 89 MMHG | SYSTOLIC BLOOD PRESSURE: 130 MMHG | WEIGHT: 181 LBS | OXYGEN SATURATION: 96 %

## 2020-01-06 DIAGNOSIS — R53.83 OTHER FATIGUE: Primary | ICD-10-CM

## 2020-01-06 DIAGNOSIS — R06.09 EXERTIONAL DYSPNEA: ICD-10-CM

## 2020-01-06 DIAGNOSIS — R42 DIZZINESS: ICD-10-CM

## 2020-01-06 DIAGNOSIS — R25.2 MUSCLE CRAMPS: ICD-10-CM

## 2020-01-06 ASSESSMENT — PAIN SCALES - GENERAL: PAINLEVEL: NO PAIN (0)

## 2020-01-06 NOTE — NURSING NOTE
Chief Complaint   Patient presents with     Follow Up     Patient is here for a general follow up        Venecia Lugo EMT at 2:28 PM on 1/6/2020.

## 2020-01-06 NOTE — PATIENT INSTRUCTIONS
- Chemical stress test, please help schedule   - Follow up schedule after return of chemical stress test       [facility] Medication Refill Request Information:  * Please contact your pharmacy regarding ANY request for medication refills.  ** [facility] Prescription Fax = [phone]  * Please allow 3 business days for routine medication refills.  * Please allow 5 business days for controlled substance medication refills.     [facility] Test Result notification information:  *You will be notified with in 7-10 days of your appointment day regarding the results of your test.  If you are on MyChart you will be notified as soon as the provider has reviewed the results and signed off on them.    [facility]: [phone]     Cardiovascular [phone] ([facility] [place], 3rd Floor S)

## 2020-01-06 NOTE — PROGRESS NOTES
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     Patient reports mild improvement in symptoms overall since last visit but symptoms continue to wax and wane. Drinking orange juice, eating bananas for hypokalemia. Heart palpitations improved for a while but started to notice them again today. Some intermittent chest tightness as well, lasting for minutes, not brought on with exertion. Also reports intermittent muscle cramping in calves and feet ongoing for the last 3-4 weeks, worse after exertion. Feels more easily fatigued and short of breath with physical activity as well, especially after shoveling snow. Cardiology workup including echo was ordered during last visit but patient has not had them done yet. Intermittent episodes of mild room spinning dizziness, ongoing for the last 3-4 weeks, improving. No syncope. No falls.     No recent fevers, weight loss, or change in appetite. No abdominal pain, black or bloody stools. Has had mild nausea that occurred after shoveling snow but no vomiting. No urinary symptoms.     Problem, Medication and Allergy Lists were reviewed and are current.  Patient is an established patient of this clinic.    Family History:  Reviewed in Epic. Noncontributory     Social History:  , former smoker, rarely drinks alcohol    ROS:   10 point ROS neg other than the symptoms noted above in the HPI.         Physical Exam:   /89 (BP Location: Right arm, Patient Position: Sitting, Cuff Size: Adult Regular)   Pulse 79   Wt 82.1 kg (181 lb)   SpO2 96%   BMI 28.35 kg/m    Body mass index is 28.35 kg/m .  Vitals were reviewed    Physical Exam:   General: Sitting upright, talking in complete sentences, non-distressed  HEENT: Normocephalic, atraumatic, PERRL, EOMI, no conjunctival pallor,  anicteric, nares patent, oropharynx clear and moist  CVS: S1/S2 WNL, RRR, no murmur, no LE edema, cap refill <2 seconds  Pulm: Non-labored breathing, vesicular breath sounds, no crackles or wheeze  Abdo: Non-distended, non-tender to palpation, no rebound or guarding, BS present   MSK: No obvious bony deformities, no clubbing  Skin: Warm and dry, no obvious rashes  Neuro: Alert and oriented x3, non-focal   Psych: Normal mood and affect     Assessment and Plan     Assessment:  Jerad Ross is a 59 year old male with PMHx significant for ACD, HTN, CAD, 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     Overall, patient feels symptoms are mildly improved from previous visit. Given essentially normal electrolytes aside from very mild hypokalemia and normal RFTs I do not think repeat labs are warranted at this time. Exertional dyspnea and nausea while shoveling snow is certainly concerning for anginal equivalent. Patient agreeable to outpatient dobutamine stress echo. He is also scheduled for colonoscopy, no weight loss of GI symptoms to raise concern for GI malignancy at this time. If dobutamine stress normal, will consider additional testing if patient continues to have symptoms.     Impression:  1. Fatigue  2. Exertional dyspnea  3. Dizziness  4. Muscle cramps    Plan:  - Dobutamine stress echo  - Follow up in clinic after echo completed     Options for treatment and follow-up care were reviewed with the patient. Jerad Ross engaged in the decision making process and verbalized understanding of the options discussed and agreed with the final plan.    Carlos Person MD  Mr Ross was seen and examined wit the resident Dr Person ,his presentation is still a bit murky but given long standing transplantation hx and that his  sx started after shoveling snow  Stress study seems appropriate will notify him of results and next steps after that  study completed.    Artemio Nguyen Md      Jan 6, 2020      Carlos Person MD  Internal Medicine

## 2020-01-09 ENCOUNTER — OFFICE VISIT (OUTPATIENT)
Dept: PSYCHIATRY | Facility: CLINIC | Age: 60
End: 2020-01-09
Attending: NURSE PRACTITIONER
Payer: MEDICARE

## 2020-01-09 VITALS
WEIGHT: 181.6 LBS | BODY MASS INDEX: 28.44 KG/M2 | HEART RATE: 73 BPM | SYSTOLIC BLOOD PRESSURE: 131 MMHG | DIASTOLIC BLOOD PRESSURE: 88 MMHG

## 2020-01-09 DIAGNOSIS — F33.41 RECURRENT MAJOR DEPRESSIVE DISORDER, IN PARTIAL REMISSION (H): ICD-10-CM

## 2020-01-09 PROCEDURE — G0463 HOSPITAL OUTPT CLINIC VISIT: HCPCS | Mod: ZF

## 2020-01-09 RX ORDER — FLUOXETINE 10 MG/1
10 CAPSULE ORAL DAILY
Qty: 90 CAPSULE | Refills: 0 | Status: SHIPPED | OUTPATIENT
Start: 2020-01-09 | End: 2020-04-13

## 2020-01-09 RX ORDER — FLUOXETINE 40 MG/1
40 CAPSULE ORAL DAILY
Qty: 90 CAPSULE | Refills: 0 | Status: SHIPPED | OUTPATIENT
Start: 2020-01-09 | End: 2020-04-13

## 2020-01-09 ASSESSMENT — PAIN SCALES - GENERAL: PAINLEVEL: NO PAIN (0)
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
## 2020-01-10 NOTE — NURSING NOTE
Chief Complaint   Patient presents with     Recheck Medication     Recurrent major depressive disorder, in partial remission (H

## 2020-01-14 ENCOUNTER — OFFICE VISIT (OUTPATIENT)
Dept: OTHER | Facility: OUTPATIENT CENTER | Age: 60
End: 2020-01-14
Payer: MEDICARE

## 2020-01-14 ENCOUNTER — HOSPITAL ENCOUNTER (OUTPATIENT)
Dept: CARDIOLOGY | Facility: CLINIC | Age: 60
Discharge: HOME | End: 2020-01-14
Attending: INTERNAL MEDICINE | Admitting: INTERNAL MEDICINE
Payer: MEDICARE

## 2020-01-14 ENCOUNTER — TELEPHONE (OUTPATIENT)
Dept: OPHTHALMOLOGY | Facility: CLINIC | Age: 60
End: 2020-01-14

## 2020-01-14 DIAGNOSIS — R53.83 OTHER FATIGUE: ICD-10-CM

## 2020-01-14 DIAGNOSIS — F91.8 CONDUCT DISORDER, UNDIFFERENTIATED TYPE: Primary | ICD-10-CM

## 2020-01-14 DIAGNOSIS — R06.09 EXERTIONAL DYSPNEA: ICD-10-CM

## 2020-01-14 DIAGNOSIS — F33.1 MODERATE EPISODE OF RECURRENT MAJOR DEPRESSIVE DISORDER (H): ICD-10-CM

## 2020-01-14 PROCEDURE — 93350 STRESS TTE ONLY: CPT | Mod: 26 | Performed by: INTERNAL MEDICINE

## 2020-01-14 PROCEDURE — 93018 CV STRESS TEST I&R ONLY: CPT | Performed by: INTERNAL MEDICINE

## 2020-01-14 PROCEDURE — 93321 DOPPLER ECHO F-UP/LMTD STD: CPT | Mod: 26 | Performed by: INTERNAL MEDICINE

## 2020-01-14 PROCEDURE — 25000128 H RX IP 250 OP 636: Performed by: INTERNAL MEDICINE

## 2020-01-14 PROCEDURE — 93016 CV STRESS TEST SUPVJ ONLY: CPT | Performed by: INTERNAL MEDICINE

## 2020-01-14 PROCEDURE — 93325 DOPPLER ECHO COLOR FLOW MAPG: CPT | Mod: 26 | Performed by: INTERNAL MEDICINE

## 2020-01-14 PROCEDURE — 25000125 ZZHC RX 250: Performed by: INTERNAL MEDICINE

## 2020-01-14 PROCEDURE — 25500064 ZZH RX 255 OP 636: Performed by: INTERNAL MEDICINE

## 2020-01-14 RX ORDER — METOPROLOL TARTRATE 1 MG/ML
1-20 INJECTION, SOLUTION INTRAVENOUS
Status: ACTIVE | OUTPATIENT
Start: 2020-01-14 | End: 2020-01-14

## 2020-01-14 RX ORDER — SODIUM CHLORIDE 9 MG/ML
INJECTION, SOLUTION INTRAVENOUS CONTINUOUS
Status: ACTIVE | OUTPATIENT
Start: 2020-01-14 | End: 2020-01-14

## 2020-01-14 RX ORDER — ATROPINE SULFATE 0.4 MG/ML
.2-2 AMPUL (ML) INJECTION
Status: COMPLETED | OUTPATIENT
Start: 2020-01-14 | End: 2020-01-14

## 2020-01-14 RX ORDER — DOBUTAMINE HYDROCHLORIDE 200 MG/100ML
10-50 INJECTION INTRAVENOUS CONTINUOUS
Status: ACTIVE | OUTPATIENT
Start: 2020-01-14 | End: 2020-01-14

## 2020-01-14 RX ADMIN — METOPROLOL TARTRATE 3 MG: 1 INJECTION, SOLUTION INTRAVENOUS at 13:18

## 2020-01-14 RX ADMIN — DOBUTAMINE HYDROCHLORIDE 10 MCG/KG/MIN: 200 INJECTION INTRAVENOUS at 13:06

## 2020-01-14 RX ADMIN — PERFLUTREN 5 ML: 6.52 INJECTION, SUSPENSION INTRAVENOUS at 13:19

## 2020-01-14 RX ADMIN — ATROPINE SULFATE 0.4 MG: 0.4 INJECTION, SOLUTION INTRAMUSCULAR; INTRAVENOUS; SUBCUTANEOUS at 13:13

## 2020-01-14 NOTE — PROGRESS NOTES
[facility] -  Case Progress Note      Client Name: Jerad Ross  YOB: 1960  MRN:  [id number]  Treating Provider: Julita Wisdom LP  Type of Session: Individual  Present in Session: client  Number of Minutes:  55    Date of Service:1/14/20     Health Maintenance Summary - Mental Health Treatment Plan       Status Date      MENTAL HEALTH TX PLAN Next Due 10/21/2020      Done 11/21/2019 HIM MENTAL HEALTH TX PLAN SCAN            Current Symptoms/Status:  Thoughts and urges to act out sexually, boundary violation, conflict and tension with his wife due to his past sexual behavior and the impact on the relationship, depression, low energy, anxiety and distress with interacting with his wife, fear of talking with her, financial distress, and relationship conflict.    Progress Toward Treatment Goals:   Client reported having one boundary violation and learning some important information about his cycle from it.    Intervention: Modality and Description:  Provided support and feedback.  Used CBT to process thoughts and urges and distress in his marital relationship.  He shared what was going on in his life before he had the boundary violation.  He was aware of finances being tight experiencing loneliness and depression and some sense of hopelessness about changing the future of his life.  He shared this is a very similar pattern to why he would use pornography in the past.  We processed in more details some of the specific emotions, thoughts, and behaviors that were going on prior to this event.  Discussed in more detail the function of this behavior and how it connects to his past history.  We processed some of his internal negative dialogue on struggles with his sense of being a man.  We talked about his history of physical ailments and how they have contributed to the struggle.  We also spent some time processing his marriage relationship and for him to continue changing some of  his part in the process.  Discussed how he can send a message to her that does not put him in the position of continuing to ask her what she thinks.  She often does not respond to him and we processed what he is learning from it.  Discussed some coping strategies for urges as well.    Response to Intervention:  Client reported gaining insight and validation.    Assignment:  Reflect on what he wants from a relationship.    Interactive Complexity:  There are four specific communication difficulties that complicate the work of the primary psychiatric procedure.  Interactive complexity (+[id number]) may be reported when at least one of these difficulties is present.    Communication difficulties present during current the psychiatric procedure include:  1. None.    Diagnosis:  No diagnosis found.      Plan / Need for Future Services:  Return for therapy in 2 weeks.      Julita Wisdom Psyd,  LP

## 2020-01-15 ENCOUNTER — TELEPHONE (OUTPATIENT)
Dept: OPHTHALMOLOGY | Facility: CLINIC | Age: 60
End: 2020-01-15

## 2020-01-15 ENCOUNTER — TELEPHONE (OUTPATIENT)
Dept: GASTROENTEROLOGY | Facility: CLINIC | Age: 60
End: 2020-01-15

## 2020-01-15 DIAGNOSIS — Z12.11 SPECIAL SCREENING FOR MALIGNANT NEOPLASMS, COLON: Primary | ICD-10-CM

## 2020-01-15 RX ORDER — BISACODYL 5 MG/1
10 TABLET, DELAYED RELEASE ORAL DAILY
Qty: 4 TABLET | Refills: 0 | Status: SHIPPED | OUTPATIENT
Start: 2020-01-15 | End: 2020-01-17

## 2020-01-15 NOTE — TELEPHONE ENCOUNTER
Patient Name: Jerad Ross   : 1960  MRN: [id number]       :  N/A    MyChart Active    VM with information needed to complete pre-assessment call.  Request pt contact Endoscopy Pre-assessment RN to complete upcoming procedure information.  Telephone call-back number provided.    Yanely Thompson RN  [facility] Endoscopy    Additional Information regarding appointment:  _____________________________________________________      Patient scheduled for:  Colonoscopy    Indication for procedure. Screening    Sedation Type: C/S    Procedure Provider:  Magno      Referring ProviderJesse Perry    Arrival time verified: 2020    Facility location verified:   [address] [address]  [address] Floor, [address]    [x] N/A for this Payor (non[place])    Pt meets medical necessity for outpatient procedure in hospital Endoscopy Unit: N/A      History & Physical: [x] N/A    Additional Information: Glaucoma  __________________________________________________      Prep Type:   [x]Golytely  Pharmacy : (not sent)      Patient taking any blood thinners ? Yes  Anticoagulants or blood thinners:            [x] ASA 81mg - May continue         ................................................  [x] Plavix  LAST anticoagulant dose: Date/Time: Thursday, 2020  [x] Advised to contact Provider re: Anticoag management pre/telma prep      Electronic implanted medical devices ? No      GI History: Yes: Diverticulosis, Hemorrhoids, Gerd      Hepatology History: Yes: Chronic hepatitis B      Heart disease ? Yes: HTN, CAD, NSTEMI      Lung disease ? Yes: Inhalers      Sleep apnea ? Yes: CPAP (pending)      Diabetic ? No  [] Advised to contact Provider re: DM management pre/telma prep      Kidney disease ? Yes: Kidney txp 10/6/1993  Hemodialysis: (pending)      Instructions given: [] Rec'd & Read   [] Reviewed     [x] Resent via [employer]  -  This includes Request to contact Pre-Assessment RN et Split-dose Golytely  instructions, Conscious Sedation policy, procedure date/time/location/provider.        Pre procedure teaching completed: [] Yes - Reviewed      IV Sedation: [] No questions regarding Sedation as ordered       :   Transportation from procedure & responsible adult to be with patient following procedure for a minimum of 6 hrs (Conscious Sedation) 24 hrs (MAC): [] (pending return call) - confirmed will have post-procedure companionship as required  _______________________________________________    Yanely Thompson RN  [facility]

## 2020-01-15 NOTE — TELEPHONE ENCOUNTER
Patient Name: Jerad Ross   : 1960  MRN: [id number]        :  N/A    MyChart Active  _____________________________________________________       Patient scheduled for:  Colonoscopy    Indication for procedure. Screening    Sedation Type: C/S    Procedure Provider:  Magno                           Referring Provider. [place]    Arrival time verified: .2020    Facility location verified:   [address] [address]  [address], [address]    [x]? N/A for this Payor ([place])    Pt meets medical necessity for outpatient procedure in hospital Endoscopy Unit: N/A       History & Physical: [x]? N/A    Additional Information: Glaucoma  __________________________________________________       Prep Type:   [x]?[facility]  Pharmacy : [facility] - [place], [place] - [address] AT [address]        Patient taking any blood thinners ? Yes  Anticoagulants or blood thinners:            [x]? ASA 81mg - May continue              ................................................  [x]? Plavix  LAST anticoagulant dose: Date/Time: Thursday, 2020  [x]? Advised to contact Provider re: Anticoag management pre/telma prep       Electronic implanted medical devices ? No       GI History: Yes: Diverticulosis, Hemorrhoids, Gerd       Hepatology History: Yes: Chronic hepatitis B       Heart disease ? Yes: HTN, CAD, NSTEMI       Lung disease ? Yes: Inhalers (prn only)       Sleep apnea ? Yes: CPAP - Not used       Diabetic ? No       Kidney disease ? Yes: Kidney txp 10/6/1993  Hemodialysis: No       Instructions given:   [x]? Reviewed     [x]? Resent via MyChart  - This includes Request to contact Pre-Assessment RN et Split-dose Golytely  instructions, Conscious Sedation policy, procedure date/time/location/provider.        Pre procedure teaching completed: [x]? Yes -  Reviewed       IV Sedation: [x]? No questions regarding Sedation as ordered       Pt completed assessment via:   [x] Return/Follow-up telephone call       : Transportation from procedure & responsible adult to be with patient following procedure for a minimum of 6 hrs (Conscious Sedation): [x]? Pending  - confirmed will have post-procedure companionship as required  _______________________________________________     Yanely Thompson RN  [facility] Endoscopy

## 2020-01-16 ENCOUNTER — TELEPHONE (OUTPATIENT)
Dept: INTERNAL MEDICINE | Facility: CLINIC | Age: 60
End: 2020-01-16

## 2020-01-16 NOTE — TELEPHONE ENCOUNTER
Health Call Center    Phone Message    May a detailed message be left on voicemail: yes    Reason for Call: Medication Question or concern regarding medication   Prescription Clarification  Name of Medication: clopidogrel (PLAVIX) 75 MG tablet  Prescribing Provider: Dr. Perry   Pharmacy: n/A    What on the order needs clarification? Pt is having a colonoscopy done next Wednesday and was told to stop taking his medication. Pt wants to run it by Dr. Perry to make sure that it's okay.           Action Taken: Message routed to:  [facility] ([facility] LIBAN

## 2020-01-16 NOTE — TELEPHONE ENCOUNTER
Spoke to patient to relay that he can stop his Plavix today and then should resume it after his procedure on 1/22/2020. Patient states verbal understanding. Britany Love LPN 1/16/2020 11:28 AM

## 2020-01-22 ENCOUNTER — HOSPITAL ENCOUNTER (OUTPATIENT)
Facility: CLINIC | Age: 60
Discharge: HOME | End: 2020-01-22
Attending: INTERNAL MEDICINE | Admitting: INTERNAL MEDICINE
Payer: MEDICARE

## 2020-01-22 VITALS
RESPIRATION RATE: 21 BRPM | OXYGEN SATURATION: 95 % | SYSTOLIC BLOOD PRESSURE: 146 MMHG | HEIGHT: 67 IN | DIASTOLIC BLOOD PRESSURE: 86 MMHG | BODY MASS INDEX: 28.41 KG/M2 | WEIGHT: 181 LBS | HEART RATE: 50 BPM

## 2020-01-22 LAB — COLONOSCOPY: NORMAL

## 2020-01-22 PROCEDURE — 88305 TISSUE EXAM BY PATHOLOGIST: CPT | Performed by: INTERNAL MEDICINE

## 2020-01-22 PROCEDURE — 45385 COLONOSCOPY W/LESION REMOVAL: CPT | Mod: PT

## 2020-01-22 PROCEDURE — 45380 COLONOSCOPY AND BIOPSY: CPT | Performed by: INTERNAL MEDICINE

## 2020-01-22 PROCEDURE — G0500 MOD SEDAT ENDO SERVICE >5YRS: HCPCS | Performed by: INTERNAL MEDICINE

## 2020-01-22 PROCEDURE — 25000128 H RX IP 250 OP 636: Performed by: INTERNAL MEDICINE

## 2020-01-22 PROCEDURE — 25000132 ZZH RX MED GY IP 250 OP 250 PS 637: Mod: GY | Performed by: INTERNAL MEDICINE

## 2020-01-22 PROCEDURE — 99153 MOD SED SAME PHYS/QHP EA: CPT | Performed by: INTERNAL MEDICINE

## 2020-01-22 RX ORDER — ONDANSETRON 2 MG/ML
4 INJECTION INTRAMUSCULAR; INTRAVENOUS
Status: DISCONTINUED | OUTPATIENT
Start: 2020-01-22 | End: 2020-01-22 | Stop reason: HOSPADM

## 2020-01-22 RX ORDER — FENTANYL CITRATE 50 UG/ML
INJECTION, SOLUTION INTRAMUSCULAR; INTRAVENOUS PRN
Status: DISCONTINUED | OUTPATIENT
Start: 2020-01-22 | End: 2020-01-22 | Stop reason: HOSPADM

## 2020-01-22 RX ORDER — SIMETHICONE
LIQUID (ML) MISCELLANEOUS PRN
Status: DISCONTINUED | OUTPATIENT
Start: 2020-01-22 | End: 2020-01-22 | Stop reason: HOSPADM

## 2020-01-22 RX ORDER — LIDOCAINE 40 MG/G
CREAM TOPICAL
Status: DISCONTINUED | OUTPATIENT
Start: 2020-01-22 | End: 2020-01-22 | Stop reason: HOSPADM

## 2020-01-22 ASSESSMENT — MIFFLIN-ST. JEOR: SCORE: 1599.64

## 2020-01-22 NOTE — OR NURSING
Procedure: Colonoscopy with polyp removal. Removed a ascending colon polyp using a large forceps. Used a cold snare, Exacto, to remove a polyp in the recto-sigmoid colon  Sedation: Conscious sedation (50 mcg fentanyl, 2 mg versed)  O2: 2-5 LPM NC during procedure  Tolerated: VS stable during and post procedure. Not c/o abdominal or chest pain  Report: Given to  RN  Pt to recovery area in stable condition, accompanied by RN

## 2020-01-23 ENCOUNTER — TELEPHONE (OUTPATIENT)
Dept: GASTROENTEROLOGY | Facility: CLINIC | Age: 60
End: 2020-01-23
Payer: MEDICARE

## 2020-01-23 DIAGNOSIS — Z86.0101 HISTORY OF ADENOMATOUS POLYP OF COLON: Primary | ICD-10-CM

## 2020-01-23 LAB — COPATH REPORT: NORMAL

## 2020-01-23 NOTE — TELEPHONE ENCOUNTER
Pt with single benign adenomatous polyp.    Please arrange for repeat colonoscopy in 5 years with IV sedation. GI lab. Order placed.    Currently on Plavix and hx of renal transplant.    LILIAN Kiran MD  Associate Professor of Medicine  Division of Gastroenterology, Hepatology and Nutrition  [facility]

## 2020-01-28 ENCOUNTER — OFFICE VISIT (OUTPATIENT)
Dept: OTHER | Facility: OUTPATIENT CENTER | Age: 60
End: 2020-01-28
Payer: MEDICARE

## 2020-01-28 DIAGNOSIS — F91.8 CONDUCT DISORDER, UNDIFFERENTIATED TYPE: Primary | ICD-10-CM

## 2020-01-28 DIAGNOSIS — F33.1 MODERATE EPISODE OF RECURRENT MAJOR DEPRESSIVE DISORDER (H): ICD-10-CM

## 2020-01-29 NOTE — PROGRESS NOTES
[facility] -  Case Progress Note      Client Name: Jerad Ross  YOB: 1960  MRN:  [id number]  Treating Provider: Julita Wisdom LP  Type of Session: Individual  Present in Session: client  Number of Minutes:  55    Date of Service:1/28/20     Health Maintenance Summary - Mental Health Treatment Plan       Status Date      MENTAL HEALTH TX PLAN Next Due 10/21/2020      Done 11/21/2019 [facility] MENTAL HEALTH TX PLAN SCAN            Current Symptoms/Status:  Thoughts and urges to act out sexually,  conflict and tension with his wife due to his past sexual behavior and the impact on the relationship, depression, low energy, anxiety and distress with interacting with his wife, fear of talking with her, financial distress.    Progress Toward Treatment Goals:   Client reported progress with sending message to his wife in a manner that did not require a response.    Intervention: Modality and Description:  Provided support and feedback.  Used CBT to process thoughts and urges and distress in his marital relationship.  He shared he did send an email to his wife.  In it he wrote out what he thought would be fair regarding some of the finances.  He was able to send in the message if he did not hear back from her he would move forward on a certain date.  He shared it was hard for him as he really would like a dialogue with her.  Validated wanting to communicate with her but if she does not respond to anything that makes it difficult.  He shared wanting to talk to her about the future of their relationship and wanting to get through step 9 before he does this.  Discussed with him what his potential options are.  He is currently living in a sober house and can stay there longer but at some point needs to figure out what is going on in the relationship.  Discussed with him that since his wife does not respond to his messages he needs to continue to communicate with her about what he thinks is  fair and what his intentions are that he will move forward if he does not hear back from her.  He also processed triggers since last session and his ability to not act on them.    Response to Intervention:  Client reported gaining insight and validation.    Assignment:  Reflect on what he wants from a relationship.    Interactive Complexity:  There are four specific communication difficulties that complicate the work of the primary psychiatric procedure.  Interactive complexity (+28450) may be reported when at least one of these difficulties is present.    Communication difficulties present during current the psychiatric procedure include:  1. None.    Diagnosis:  Encounter Diagnoses   Name Primary?     Other Specified Disruptive, Impulse-Control, and Conduct Disorder (hypersexuality) Yes     Moderate episode of recurrent major depressive disorder (H)          Plan / Need for Future Services:  Return for therapy in 2 weeks.      Julita Wisdom Psyd,  LP

## 2020-02-25 ENCOUNTER — OFFICE VISIT (OUTPATIENT)
Dept: OTHER | Facility: OUTPATIENT CENTER | Age: 60
End: 2020-02-25
Payer: MEDICARE

## 2020-02-25 DIAGNOSIS — F91.8 CONDUCT DISORDER, UNDIFFERENTIATED TYPE: ICD-10-CM

## 2020-02-25 DIAGNOSIS — F33.1 MODERATE EPISODE OF RECURRENT MAJOR DEPRESSIVE DISORDER (H): Primary | ICD-10-CM

## 2020-02-26 NOTE — PROGRESS NOTES
[facility] -  Case Progress Note      Client Name: Jerad Ross  YOB: 1960  MRN:  [id number]  Treating Provider: Julita Wisdom LP  Type of Session: Individual  Present in Session: client  Number of Minutes:  55    Date of Service:2/25/20     Health Maintenance Summary - Mental Health Treatment Plan       Status Date      MENTAL HEALTH TX PLAN Next Due 10/21/2020      Done 11/21/2019 [facility] MENTAL HEALTH TX PLAN SCAN            Current Symptoms/Status:  Depression, low energy, thoughts and urges to act out sexually, boundary violations, conflict and tension with his wife due to his past sexual behavior and the impact on the relationship,  anxiety and distress with interacting with his wife, fear of talking with her, financial distress.    Progress Toward Treatment Goals:   Client reported progress with having a meeting with his wife with her  to discuss financial arrangements    Intervention: Modality and Description:  Provided support and feedback.  Used CBT to process thoughts and urges and distress in his marital relationship.  He shared getting a message to his wife after his most recent message that she was willing to meet with him but wanted to range it with her minutes.  She wanted him to contact the  which he did.  He shared they did meet talk about finances and he shared with her what he is willing to do until the end of her lease in the fall.  He shared she did agree to this financial arrangement.  He shared there was a little bit communication about the relationship.  He flipped relationship well and but he does not have to take the steps at this time.  Decided stay at sober living for another period of him.  He has had a number of boundary violations over the past couple of weeks.  We discussed triggers and decision making regarding these boundary violations.  Client is able to see that was back to thinking this is a choice and is using medicine  coping skill.  Does not have the sense that it will take over his life at this point.  He was able to challenge some of these beliefs today.    Response to Intervention:  Client reported gaining insight and validation.    Assignment:  Reflect on what he wants from a relationship.    Interactive Complexity:  There are four specific communication difficulties that complicate the work of the primary psychiatric procedure.  Interactive complexity (+95958) may be reported when at least one of these difficulties is present.    Communication difficulties present during current the psychiatric procedure include:  1. None.    Diagnosis:  Encounter Diagnoses   Name Primary?     Moderate episode of recurrent major depressive disorder (H) Yes     Other Specified Disruptive, Impulse-Control, and Conduct Disorder (hypersexuality)          Plan / Need for Future Services:  Return for therapy in 2 weeks.      Julita Wisdom Psyd,  LP

## 2020-03-10 ENCOUNTER — OFFICE VISIT (OUTPATIENT)
Dept: OTHER | Facility: OUTPATIENT CENTER | Age: 60
End: 2020-03-10
Payer: MEDICARE

## 2020-03-10 DIAGNOSIS — F91.8 CONDUCT DISORDER, UNDIFFERENTIATED TYPE: ICD-10-CM

## 2020-03-10 DIAGNOSIS — F33.1 MODERATE EPISODE OF RECURRENT MAJOR DEPRESSIVE DISORDER (H): Primary | ICD-10-CM

## 2020-03-11 NOTE — PROGRESS NOTES
[facility] -  Case Progress Note      Client Name: Jerad Ross  YOB: 1960  MRN:  [id number]  Treating Provider: Julita Wisdom LP  Type of Session: Individual  Present in Session: client  Number of Minutes:  55    Date of Service:3/10/20     Health Maintenance Summary - Mental Health Treatment Plan       Status Date      MENTAL HEALTH TX PLAN Next Due 10/21/2020      Done 11/21/2019 [facility] MENTAL HEALTH TX PLAN SCAN            Current Symptoms/Status:  Depression, low energy, thoughts and urges to act out sexually,  conflict and tension with his wife due to his past sexual behavior and the impact on the relationship,  anxiety and distress with interacting with his wife, fear of talking with her, financial distress.    Progress Toward Treatment Goals:   Client reported progress with maintaining boundaries and maintaining interaction with his wife.    Intervention: Modality and Description:  Provided support and feedback.  Used CBT to process thoughts and urges and distress in his marital relationship.  He shared maintaining his boundaries since her last visit.  He feels really good about doing this.  He shared that his wife was in the hospital sick and so he called her to see how she was doing.  He offered to bring her some things and they visited for some time.  He shared the visit was relatively brief and although they did not have conflict, he sensed her not thinking he was being fully open and honest.  He shared that despite the urges he was able to maintain his boundaries.  He is not sure what will happen long-term with his relationship but is moving forward for himself.  He will stay in the sober living environment for now.  He did some processing of how he is past sexual behavior interfered with his life and is expressing some grief about this loss.  He shared overall he feels like his mood is relatively stable but continues to feel the depression.    Response to  Intervention:  Client reported gaining insight and validation.    Assignment:  Reflect on his current life goals.    Interactive Complexity:  There are four specific communication difficulties that complicate the work of the primary psychiatric procedure.  Interactive complexity (+13095) may be reported when at least one of these difficulties is present.    Communication difficulties present during current the psychiatric procedure include:  1. None.    Diagnosis:  Encounter Diagnoses   Name Primary?     Moderate episode of recurrent major depressive disorder (H) Yes     Other Specified Disruptive, Impulse-Control, and Conduct Disorder (hypersexuality)          Plan / Need for Future Services:  Return for therapy in 2 weeks.      Julita Wisdom Psyd,  LP

## 2020-03-16 DIAGNOSIS — Z94.0 KIDNEY TRANSPLANTED: Primary | ICD-10-CM

## 2020-03-16 RX ORDER — PREDNISONE 5 MG/1
5 TABLET ORAL DAILY
Qty: 90 TABLET | Refills: 3 | Status: SHIPPED | OUTPATIENT
Start: 2020-03-16 | End: 2021-04-19

## 2020-03-19 ENCOUNTER — NURSE TRIAGE (OUTPATIENT)
Dept: NURSING | Facility: CLINIC | Age: 60
End: 2020-03-19

## 2020-03-19 NOTE — TELEPHONE ENCOUNTER
Pt reporting for the last week having cold symptoms.  Congestive cough, not coughing any mucous up.   Clear drainage from the nose.  Hot sweats, Cold sweats, the chills.  Has not taking his temperature.   Sore throat for one day, but went away. No rash or aches noted.   No nausea, vomiting, diarrhea or constipation noted. No urinary issues noted.   While I was on the phone with the Pt.  The Pt sounded very congested and was coughing like a barking seal.      The cough is keeping the Pt awake at night.   Having a little bit of chest tightness off and on.     But not difficulty breathing or talking while on the phone.    Drinking plenty of fluids and has some appetite.         Pt would like a visit for Pt care.    Pl call the Pt back @ [phone] to sent up an a visit for Pt care.   Thank you     Mireille Pacheco RN  Central Triage Red Flags/Med Refills      Additional Information    Negative: Bluish (or gray) lips or face    Negative: Severe difficulty breathing (e.g., struggling for each breath, speaks in single words)    Negative: Rapid onset of cough and has hives    Negative: Coughing started suddenly after medicine, an allergic food or bee sting    Negative: Difficulty breathing after exposure to flames, smoke, or fumes    Negative: Sounds like a life-threatening emergency to the triager    Negative: Previous asthma attacks and this feels like asthma attack    Negative: Chest pain present when not coughing    Negative: Difficulty breathing    Negative: Passed out (i.e., fainted, collapsed and was not responding)    Negative: Patient sounds very sick or weak to the triager    Negative: Coughed up > 1 tablespoon (15 ml) blood (Exception: blood-tinged sputum)    Negative: Fever > 103 F (39.4 C)    Negative: Fever > 101 F (38.3 C) and over 60 years of age    Negative: Fever > 100.0 F (37.8 C) and has diabetes mellitus or a weak immune system (e.g., HIV positive, cancer chemotherapy, organ transplant, splenectomy,  chronic steroids)    Negative: Fever > 100.0 F (37.8 C) and bedridden (e.g., nursing home patient, stroke, chronic illness, recovering from surgery)    Negative: Increasing ankle swelling    Negative: Wheezing is present    Negative: SEVERE coughing spells (e.g., whooping sound after coughing, vomiting after coughing)    Negative: Coughing up aureliano-colored (reddish-brown) or blood-tinged sputum    Negative: Fever present > 3 days (72 hours)    Negative: Fever returns after gone for over 24 hours and symptoms worse or not improved    Negative: Using nasal washes and pain medicine > 24 hours and sinus pain persists    Negative: Known COPD or other severe lung disease (i.e., bronchiectasis, cystic fibrosis, lung surgery) and worsening symptoms (i.e., increased sputum purulence or amount, increased breathing difficulty)    Negative: Continuous (nonstop) coughing interferes with work or school and no improvement using cough treatment per Care Advice    Patient wants to be seen    Protocols used: COUGH-A-OH    We should screen by phone first.  JL

## 2020-03-20 ENCOUNTER — TELEPHONE (OUTPATIENT)
Dept: GASTROENTEROLOGY | Facility: CLINIC | Age: 60
End: 2020-03-20

## 2020-03-20 NOTE — TELEPHONE ENCOUNTER
Co-pay assistance form filled out and faxed.      Writer called to inform him of the need for follow up visit with Dr. Claros as it has been 2 years since last seen.     [facility]    Phone Message    May a detailed message be left on voicemail: yes     Reason for Call: Other: Pharmacy called said they sent a co-pay assistance form to [facility] Clar[facility], please inform if that was not recvd. If it was recvd please fax to the number on the form.     Action Taken: Other: [facility] Hepatology    Travel Screening: Not Applicable

## 2020-03-23 ENCOUNTER — VIRTUAL VISIT (OUTPATIENT)
Dept: INTERNAL MEDICINE | Facility: CLINIC | Age: 60
End: 2020-03-23
Payer: MEDICARE

## 2020-03-23 DIAGNOSIS — R05.9 COUGH: ICD-10-CM

## 2020-03-23 RX ORDER — AZITHROMYCIN 250 MG/1
TABLET, FILM COATED ORAL
COMMUNITY
Start: 2020-03-20 | End: 2020-08-18

## 2020-03-23 RX ORDER — BENZONATATE 100 MG/1
100 CAPSULE ORAL
COMMUNITY
Start: 2020-03-19 | End: 2020-08-18

## 2020-03-23 ASSESSMENT — PAIN SCALES - GENERAL: PAINLEVEL: NO PAIN (0)

## 2020-03-23 NOTE — PROGRESS NOTES
Called pt who was seen in ER with cough, CXR showed LLL atelectasis or infiltrate and he was treated with zpak and tessalon.  Some improved also taking pulmicort and albuterol with some persistent cough. Discussed change to advair and rx sent.  Recommended test for corona virus when available and 14 day self quarantine now. Other questions answered.  Aston Perry MD

## 2020-03-26 ENCOUNTER — VIRTUAL VISIT (OUTPATIENT)
Dept: OTHER | Facility: OUTPATIENT CENTER | Age: 60
End: 2020-03-26
Payer: MEDICARE

## 2020-03-26 DIAGNOSIS — F33.1 MODERATE EPISODE OF RECURRENT MAJOR DEPRESSIVE DISORDER (H): Primary | ICD-10-CM

## 2020-03-26 DIAGNOSIS — F91.8 CONDUCT DISORDER, UNDIFFERENTIATED TYPE: ICD-10-CM

## 2020-03-27 NOTE — PROGRESS NOTES
"[facility] -  Case Progress Note      Client Name: Jerad Ross  YOB: 1960  MRN:  [id number]  Treating Provider: Julita Wisdom LP  Type of Session: Individual  Present in Session: client  Number of Minutes:  55    Start time: 4:00pm  End time :4:55pm    The following was reviewed with the patient.   \"We have found that certain health care needs can be provided without the need for a face to face visit.  This service lets us provide the care you need with a phone conversation. I will have full access to your [facility] medical record during this entire phone call.   I will be taking notes for your medical record. Since this is like an office visit, we will bill your insurance company for this service. There are potential benefits and risks of telephone visits (e.g. limits to patient confidentiality) that differ from in-person visits.?  Confidentiality still applies for telephone services, and nobody will record the visit.  It is important to be in a quiet, private space that is free of distractions (including cell phone or other devices) during the visit.??If during the course of the call I believe a telephone visit is not appropriate, you will not be charged for this service\"    Consent has been obtained for this service by care team member: Yes      Date of Service:3/26/20     Health Maintenance Summary - Mental Health Treatment Plan       Status Date      MENTAL HEALTH TX PLAN Next Due 10/21/2020      Done 11/21/2019 [facility] MENTAL HEALTH TX PLAN SCAN            Current Symptoms/Status:  Distressed about conflict with group therapist, depression, low energy, thoughts and urges to act out sexually, boundary violations, conflict and tension with his wife due to his past sexual behavior and the impact on the relationship,  anxiety and distress with interacting with his wife, fear of talking with her, financial distress.    Progress Toward Treatment Goals:   Client reported " progress with seeking support from group members and intervening on urges, maintaining mood.    Intervention: Modality and Description:  Provided support and feedback.  Used CBT to process thoughts and urges and distress related to conflict with group therapist.  Client stated he wanted to process what happened and get some feedback.  He shared on Monday that he and the other group members made an assumption that group was canceled because it had been canceled the week before.  Amongst the group members they set up a time to have connection with the group members.  They set this up for 5 PM on Monday.  At some point they heard from the group therapist that she was upset that they planned this meeting without consulting her, assuming there will be no group, and during group time.  Client shared some of the ongoing dialogue with her on the text exchange as well as her calling him.  He shared feeling upset and as we were able to explore this he felt her wording and tone was angry, critical, and scolding.  He shared them attending the group by phone and the therapist stated she felt disrespected and talked about the issue again.  Client shared he felt upset and hurt and was thinking he could no longer be a part of this group.  It was at this time we explored more about his interpretation and unresolved feelings and that it would be important for him to talk about this in group.  Guided him on how to present the information as his feelings and thoughts and not telling her or others what they should or should not do.  Client felt like this made sense to him and he thought he would do this at the next group and would report back at our next individual session.    Response to Intervention:  Client reported gaining insight and validation.    Assignment:  Reflect on his current life goals.    Interactive Complexity:  There are four specific communication difficulties that complicate the work of the primary psychiatric  procedure.  Interactive complexity (+26962) may be reported when at least one of these difficulties is present.    Communication difficulties present during current the psychiatric procedure include:  1. None.    Diagnosis:  Encounter Diagnoses   Name Primary?     Moderate episode of recurrent major depressive disorder (H) Yes     Other Specified Disruptive, Impulse-Control, and Conduct Disorder (hypersexuality)          Plan / Need for Future Services:  Return for therapy in 2 weeks.      Julita Wisdom Psyd,  LP

## 2020-04-13 ENCOUNTER — VIRTUAL VISIT (OUTPATIENT)
Dept: PSYCHIATRY | Facility: CLINIC | Age: 60
End: 2020-04-13
Attending: NURSE PRACTITIONER
Payer: MEDICARE

## 2020-04-13 DIAGNOSIS — B18.1 CHRONIC VIRAL HEPATITIS B WITHOUT DELTA AGENT AND WITHOUT COMA (H): ICD-10-CM

## 2020-04-13 DIAGNOSIS — B18.1 CHRONIC HEPATITIS B (H): ICD-10-CM

## 2020-04-13 DIAGNOSIS — F33.41 RECURRENT MAJOR DEPRESSIVE DISORDER, IN PARTIAL REMISSION (H): ICD-10-CM

## 2020-04-13 RX ORDER — FLUOXETINE 40 MG/1
40 CAPSULE ORAL DAILY
Qty: 90 CAPSULE | Refills: 0 | Status: SHIPPED | OUTPATIENT
Start: 2020-04-13 | End: 2020-07-13

## 2020-04-13 RX ORDER — FLUOXETINE 10 MG/1
10 CAPSULE ORAL DAILY
Qty: 90 CAPSULE | Refills: 0 | Status: SHIPPED | OUTPATIENT
Start: 2020-04-13 | End: 2020-07-13
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
## 2020-04-14 ENCOUNTER — VIRTUAL VISIT (OUTPATIENT)
Dept: OTHER | Facility: OUTPATIENT CENTER | Age: 60
End: 2020-04-14
Payer: MEDICARE

## 2020-04-14 DIAGNOSIS — F33.1 MODERATE EPISODE OF RECURRENT MAJOR DEPRESSIVE DISORDER (H): Primary | ICD-10-CM

## 2020-04-14 DIAGNOSIS — F91.8 CONDUCT DISORDER, UNDIFFERENTIATED TYPE: ICD-10-CM

## 2020-04-14 RX ORDER — ENTECAVIR 0.5 MG/1
0.5 TABLET, FILM COATED ORAL DAILY
Qty: 90 TABLET | Refills: 3 | Status: SHIPPED | OUTPATIENT
Start: 2020-04-14 | End: 2020-04-20

## 2020-04-14 NOTE — PROGRESS NOTES
"[facility] -  Case Progress Note      Client Name: Jerad Ross  YOB: 1960  MRN:  [id number]  Treating Provider: Julita Wisdom LP  Type of Session: Individual  Present in Session: client  Number of Minutes:  55    Client not able to video at this time because of rules of group home. Session completed via telephone.    The following was reviewed with the patient.   \"We have found that certain health care needs can be provided without the need for a face to face visit.  This service lets us provide the care you need with a phone conversation. I will have full access to your [facility] medical record during this entire phone call.   I will be taking notes for your medical record. Since this is like an office visit, we will bill your insurance company for this service. There are potential benefits and risks of telephone visits (e.g. limits to patient confidentiality) that differ from in-person visits.?  Confidentiality still applies for telephone services, and nobody will record the visit.  It is important to be in a quiet, private space that is free of distractions (including cell phone or other devices) during the visit.??If during the course of the call I believe a telephone visit is not appropriate, you will not be charged for this service\"    Consent has been obtained for this service by care team member: Yes      Date of Service:4/14/20     Health Maintenance Summary - Mental Health Treatment Plan       Status Date      MENTAL HEALTH TX PLAN Next Due 10/21/2020      Done 11/21/2019 [facility] MENTAL HEALTH TX PLAN SCAN            Current Symptoms/Status:  Distressed about conflict with group therapist, depression, low energy, thoughts and urges to act out sexually,  conflict and tension with his wife due to his past sexual behavior and the impact on the relationship,  financial distress.    Progress Toward Treatment Goals:   Client reported progress with bringing up his " experience of therapists message during in group.    Intervention: Modality and Description:  Provided support and feedback.  Used CBT to process thoughts and urges, depression, and distress related to interaction with his group and therapist.  Client shared following my recommendation and that last week's group he brought up his experience the message to the therapist sent his feeling like he was being scolded.  He shared the therapist praised him for bringing this issue up in group and also shared her experience of the situation.  She shared group members did not give any feedback.  He feels good about taking this step because it is changing his patterns.  He shared still feeling unsure of the group and we talked about this further.  He was able to identify that he did not feel like his point of view was validated.  We discussed that him addressing the issue was a huge step for him and not communicating what he needed and wanted.  Discussed decision-making about given the continued hurt feelings how to move forward with the group because of the many benefits he gets from it.  Discussed how not getting validation is a trigger for him and he has a pattern in some of his relationships.  Discussed the others aspect of learning to tolerate these emotions and not just trying to make them go away.  He was able to connect this with his pattern of sexual acting out behavior.      Response to Intervention:  Client reported gaining insight and validation.    Assignment:  Reflect on his current life goals.    Interactive Complexity:  There are four specific communication difficulties that complicate the work of the primary psychiatric procedure.  Interactive complexity (+[id number]) may be reported when at least one of these difficulties is present.    Communication difficulties present during current the psychiatric procedure include:  1. None.    Diagnosis:  Encounter Diagnoses   Name Primary?     Moderate episode of recurrent  major depressive disorder (H) Yes     Other Specified Disruptive, Impulse-Control, and Conduct Disorder (hypersexuality)          Plan / Need for Future Services:  Return for therapy in 2 weeks.      Julita Wisdom Psyd,  LP

## 2020-04-20 ENCOUNTER — VIRTUAL VISIT (OUTPATIENT)
Dept: GASTROENTEROLOGY | Facility: CLINIC | Age: 60
End: 2020-04-20
Attending: INTERNAL MEDICINE
Payer: MEDICARE

## 2020-04-20 DIAGNOSIS — B18.1 CHRONIC HEPATITIS B (H): Primary | ICD-10-CM

## 2020-04-20 DIAGNOSIS — B18.1 CHRONIC VIRAL HEPATITIS B WITHOUT DELTA AGENT AND WITHOUT COMA (H): ICD-10-CM

## 2020-04-20 RX ORDER — ENTECAVIR 0.5 MG/1
0.5 TABLET, FILM COATED ORAL DAILY
Qty: 90 TABLET | Refills: 3 | Status: SHIPPED | OUTPATIENT
Start: 2020-04-20 | End: 2021-04-13

## 2020-04-20 NOTE — PROGRESS NOTES
"Jerad Ross is a 60 year old male who is being evaluated via a billable telephone visit during the COVID-19 pandemic and clinic response to limit in-person visits.  The patient has been notified of following:   \"This telephone visit will be conducted via a call between you and your physician/provider. We have found that certain health care needs can be provided without the need for a physical exam.  This service lets us provide the care you need with a short phone conversation.  If a prescription is necessary we can send it directly to your pharmacy.  If lab work is needed we can place an order for that and you can then stop by our lab to have the test done at a later time.  If during the course of the call the physician/provider feels a telephone visit is not appropriate, you will not be charged for this service.\"   Consent has been obtained for this service by 1 care team member: No  I have reviewed and updated the patient's Past Medical History, Social History, Family History and Medication List.      Phone call contact time  Call Started at 11:07  Call Ended at 11:18  On phone patient     A/P  60 year old male with HBV. He is on entecavir with normal liver tests on last check. F1-F2 by fibrosis scan in 2018. HBV DNA below limits of detection in 2018. Not getting LFTs and HBV DNA regularly. Discussed this with him.  Continue entecavir (renewed)  Will continue with every 6 month labs and US. Ordered.    Lina Claros MD    Hepatology/Liver Transplant  Medical Director, Liver Transplantation  [facility]    Appointments [phone]  Clinic Fax [phone]  Transplant Care [phone] Option 4  Transplant Fax [phone]  Administrative Office [phone]  Administrative Fax [phone]  ===================================================================  Subjective  Jerad Ross is a 60 year old male with hepatitis B.  He is s/p KT 1993 for focal " glomerulosclerosis. On entecavir since 2016.  Last HBV DNA was 11/6/18 and it was below limits of detection. Last LFTs were 11/2018     eAby positive  e ag negative  Assessment of fibrosis: fibrosis scan 9/16/17 F1-F2  Histrory of treatment: entecavir only. Was not able to get it reliably in past. Now he can and has been taking it consistently.      HCV negative in 2008  HIV not documented     ALT 24  AST 20  Tbili 0.9  Alk phos 85  INR 0.86  Albumin 3.3  Platelets 338  Creatinine 0.94  ETOH use: very rare. 1 drink per month.     Past Medical History        Past Medical History:   Diagnosis Date     AION (acute ischemic optic neuropathy)       Anemia in chronic renal disease       Avascular necrosis of bones of both hips (H)       s/p bilateral hip replacements     Basal cell carcinoma       CAD (coronary artery disease) 6/17/2014     Chronic hepatitis B (H)       Clostridium difficile colitis       CRP elevated       Depression       Diverticulosis       Dyslipidemia       FSGS (focal segmental glomerulosclerosis)       Gastric ulcer with hemorrhage 2/12/12     GERD (gastroesophageal reflux disease)       Glaucoma       OHTN     Hemorrhoids       Hypertension secondary to other renal disorders       Hypogonadism in male       Immunosuppressed status (H)       Kidney replaced by transplant       focal glomerulosclerosis      NSTEMI (non-ST elevated myocardial infarction) (H) 6/17/2014     JULIANE (obstructive sleep apnea)       Doesn't use CPAP     Paracentral scotoma       LE     Secondary renal hyperparathyroidism (H)       Squamous cell carcinoma             Social History   Social History            Social History     Marital status:        Spouse name: N/A     Number of children: 0     Years of education: N/A           Occupational History       Disabled      [employer]              Social History Main Topics     Smoking status: Former Smoker       Packs/day: 1.00       Years: 9.00        Quit date: 1/1/1988     Smokeless tobacco: Former User     Alcohol use 0.0 oz/week        0 Standard drinks or equivalent per week          Comment: rarely 1 drink per month     Drug use: No     Sexual activity: Not on file            Other Topics Concern     Special Diet No     Exercise Yes       walks      Social History Narrative           Family History          Family History   Problem Relation Age of Onset     Cardiovascular Father         AI with valve repair     Hypertension Father       KIDNEY DISEASE Father       CANCER Paternal Grandmother         ovarian ca     CEREBROVASCULAR DISEASE Paternal Grandfather       CEREBROVASCULAR DISEASE Maternal Grandfather       KIDNEY DISEASE Paternal Aunt       Skin Cancer No family hx of       Glaucoma No family hx of       Macular Degeneration No family hx of       Amblyopia No family hx of             ROS: 10 point ROS neg other than the symptoms noted above in the HPI.

## 2020-04-24 ENCOUNTER — TELEPHONE (OUTPATIENT)
Dept: TRANSPLANT | Facility: CLINIC | Age: 60
End: 2020-04-24

## 2020-04-24 NOTE — LETTER
PHYSICIAN ORDERS      DATE & TIME ISSUED: 2020 2:12 PM  PATIENT NAME: Jerad Ross   : 1960     [facility] MR# [if applicable]: [id number]     DIAGNOSIS:  Kidney transplant   ICD-10 CODE: Z94.0     To whom it may concern:  Mr. Ander Ross had a successful kidney transplant on 10-6-1993 at the [facility]. Patient has stable kidney function.     Any questions please call: [phone] option 5    .

## 2020-04-24 NOTE — LETTER
PHYSICIAN ORDERS      DATE & TIME ISSUED: 2020 2:12 PM  PATIENT NAME: Jerad Ross   : 1960     [facility] MR# [if applicable]: [id number]     DIAGNOSIS:  Kidney transplant   ICD-10 CODE: Z94.0     To whom it may concern:  Mr. Ander Ross had a successful kidney transplant on 10-6-1993 at the [facility]. Patient has stable kidney function.     Any questions please call: [phone] option 5    .

## 2020-04-24 NOTE — TELEPHONE ENCOUNTER
Patient Call: General  Route to LPN    Reason for call: patient has new insurance coverage and is needing a letter that he has had a successful Kidney Transplant letter sent to his My Chart  Call back needed? Yes    Return Call Needed  Same as documented in contacts section  When to return call?: Same day: Route High Priority

## 2020-04-28 ENCOUNTER — VIRTUAL VISIT (OUTPATIENT)
Dept: OTHER | Facility: OUTPATIENT CENTER | Age: 60
End: 2020-04-28
Payer: MEDICARE

## 2020-04-28 DIAGNOSIS — F91.8 CONDUCT DISORDER, UNDIFFERENTIATED TYPE: ICD-10-CM

## 2020-04-28 DIAGNOSIS — F33.1 MODERATE EPISODE OF RECURRENT MAJOR DEPRESSIVE DISORDER (H): Primary | ICD-10-CM

## 2020-04-29 NOTE — PROGRESS NOTES
"[facility] -  Case Progress Note      Client Name: Jerad Ross  YOB: 1960  MRN:  [id number]  Treating Provider: Julita Wisdom LP  Type of Session: Individual  Present in Session: client  Number of Minutes:  55    Client not able to video at this time because of rules of group home. Session completed via telephone.    The following was reviewed with the patient.   \"We have found that certain health care needs can be provided without the need for a face to face visit.  This service lets us provide the care you need with a phone conversation. I will have full access to your [facility] medical record during this entire phone call.   I will be taking notes for your medical record. Since this is like an office visit, we will bill your insurance company for this service. There are potential benefits and risks of telephone visits (e.g. limits to patient confidentiality) that differ from in-person visits.?  Confidentiality still applies for telephone services, and nobody will record the visit.  It is important to be in a quiet, private space that is free of distractions (including cell phone or other devices) during the visit.??If during the course of the call I believe a telephone visit is not appropriate, you will not be charged for this service\"    Consent has been obtained for this service by care team member: Yes      Date of Service:4/28/20     Health Maintenance Summary - Mental Health Treatment Plan       Status Date      MENTAL HEALTH TX PLAN Next Due 10/21/2020      Done 11/21/2019 HIM MENTAL HEALTH TX PLAN SCAN        Current Symptoms/Status:  depression, low energy, thoughts and urges to act out sexually,  conflict and tension with his wife due to his past sexual behavior and the impact on the relationship,  financial distress.    Progress Toward Treatment Goals:   Client reported progress with maintaining boundaries and improved mood.    Intervention: Modality " and Description:  Provided support and feedback.  Used CBT to process thoughts and urges, depression, and distress related to interaction with his group and therapist.  Client shared he has client continued with group and if he is glad he is in the group.  He shared that now and then he feels a twinge of some of the hurt but he is able to continue.  He feels strongly connected to the other guys in group and feels like his he is getting something from the therapist as well.  He processed how he has been able to maintain boundaries and feels good about the direction of his life.  He discussed the pros and cons of working on his marriage relationship.  We discussed some of his patterns in the relationship.  He wants to reach out to her to see what could happen.  In the end he discussed a small step of reaching out to see if she will play a game on line with him.    Response to Intervention:  Client reported gaining insight and validation.    Assignment:  Contact his wife to see if she will play a game online with him.    Interactive Complexity:  There are four specific communication difficulties that complicate the work of the primary psychiatric procedure.  Interactive complexity (+69083) may be reported when at least one of these difficulties is present.    Communication difficulties present during current the psychiatric procedure include:  1. None.    Diagnosis:  Encounter Diagnoses   Name Primary?     Moderate episode of recurrent major depressive disorder (H) Yes     Other Specified Disruptive, Impulse-Control, and Conduct Disorder (hypersexuality)          Plan / Need for Future Services:  Return for therapy in 2 weeks.      Julita Wisdom Psyd,  LP

## 2020-05-15 DIAGNOSIS — I25.10 CORONARY ARTERY DISEASE INVOLVING NATIVE CORONARY ARTERY OF NATIVE HEART WITHOUT ANGINA PECTORIS: ICD-10-CM

## 2020-05-15 DIAGNOSIS — I21.4 NSTEMI (NON-ST ELEVATED MYOCARDIAL INFARCTION) (H): ICD-10-CM

## 2020-05-15 DIAGNOSIS — R07.9 ACUTE CHEST PAIN: ICD-10-CM

## 2020-05-18 RX ORDER — ISOSORBIDE MONONITRATE 30 MG/1
15 TABLET, EXTENDED RELEASE ORAL DAILY
Qty: 45 TABLET | Refills: 0 | Status: SHIPPED | OUTPATIENT
Start: 2020-05-18 | End: 2020-08-17

## 2020-05-18 RX ORDER — AMLODIPINE BESYLATE 5 MG/1
5 TABLET ORAL DAILY
Qty: 90 TABLET | Refills: 0 | Status: SHIPPED | OUTPATIENT
Start: 2020-05-18 | End: 2020-07-13

## 2020-05-18 RX ORDER — CLOPIDOGREL BISULFATE 75 MG/1
75 TABLET ORAL DAILY
Qty: 90 TABLET | Refills: 0 | Status: SHIPPED | OUTPATIENT
Start: 2020-05-18 | End: 2020-08-17

## 2020-05-18 RX ORDER — ATORVASTATIN CALCIUM 20 MG/1
20 TABLET, FILM COATED ORAL DAILY
Qty: 90 TABLET | Refills: 0 | Status: SHIPPED | OUTPATIENT
Start: 2020-05-18 | End: 2020-10-30

## 2020-05-18 NOTE — TELEPHONE ENCOUNTER
AMLODIPINE BESYLATE 5MG TABLETS    Last Written Prescription Date:  5/8/2019  Last Fill Quantity: 100,   # refills: 3  Last Office Visit : 3/23/2020  Future Office visit:  None  Routing refill request to provider for review/approval because:  Abnormal B/P    Ok to send 90 day supply and notified Provider when B/P >140/90  90 Tabs, 0 Refills sent to pharm.  Provider notified    ISOSORBIDE MONONITRATE 30MG ER TABS   Last Written Prescription Date:  5/8/2019  Last Fill Quantity: 50,   # refills: 3  Last Office Visit : 3/23/2020  Future Office visit:  None  Routing refill request to provider for review/approval because:  Abnormal B/P    Ok to send 90 day supply and notified Provider when B/P >140/90  45 Tabs, 0 Refills sent to pharm.   Provider notified    ATORVASTATIN 20MG TABLETS   Last Written Prescription Date:  5/8/2019  Last Fill Quantity: 100,   # refills: 3  Last Office Visit : 3/23/2020  Future Office visit:  None  Routing refill request to provider for review/approval because:  Labs Due: LDL      90 day henry sent to pharm     Yaima Roberson, ERIC notified       CLOPIDOGREL 75MG TABLETS   Last Written Prescription Date:  5/8/2019  Last Fill Quantity: 100,   # refills: 3  Last Office Visit : 3/23/2020  Future Office visit:  None  90 Tabs, 0 Refills sent to pharm    5/18/2020     Mireille Pacheco RN  Central Triage Red Flags/Med Refills

## 2020-05-19 ENCOUNTER — TELEPHONE (OUTPATIENT)
Dept: CARDIOLOGY | Facility: CLINIC | Age: 60
End: 2020-05-19

## 2020-05-19 DIAGNOSIS — R00.2 PALPITATIONS: Primary | ICD-10-CM

## 2020-05-19 NOTE — TELEPHONE ENCOUNTER
"Called patient, he reports heart palpitations and generalized malaise. He has been working in his garden including relatively strenuous work without worsening of symptoms. He describes a \"caving in\" sensation in his chest which is not exertional and which is not in any way similar to his prior anginal equivalent and which has been stable.    He had recent dobutamine echocardiogram (reviewed) which was unremarkable.    A/P:  1. Palpitations:  -14-day ZioPatch  -follow up TBD based on ZioPatch results or if symptoms fail to improve    2. Atypical chest pain:  With normal stress echocardiogram and symptomatology different from prior anginal equivalent. In any case, his coronary anatomy has not been felt amenable to revascularization. If \"caving in\" type pain persists and arrhythmia eval is unrevealing, an empiric trial of anti-anginal therapy could be considered.    Balta Orantes MD  Cardiology    "

## 2020-05-19 NOTE — TELEPHONE ENCOUNTER
"[facility] Call Center    Phone Message    May a detailed message be left on voicemail: yes     Reason for Call: Symptoms or Concerns     If patient has red-flag symptoms, warm transfer to triage line    Current symptom or concern: Pt submitted mychart request for appointment with Dr. Orantes - \"Heart palpitations, weak pulse, fatigue.\" Unsure if he should be seen in clinic vs virtual visit. Please review and contact patient    Symptoms have been present for: Unsure    Has patient previously been seen for this? No    By : Lamberto     Date: 5/8/2018 was the date of last office visit    Are there any new or worsening symptoms? No      Action Taken: Message routed to:  [facility] Cardiology    Travel Screening: Not Applicable                                                                        "

## 2020-05-19 NOTE — TELEPHONE ENCOUNTER
Spoke to patient and he states that he had a stress done on January 2020 and is having symptoms of heart palpitations, weak pulse, and fatigue. Patient has not been seen by cardiology since 2018 and is due for a visit.     Spoke with Dr. Orantes and he will call patient to further discuss symptoms.     Patient states understanding and agrees to call with any questions or concerns.

## 2020-05-21 ENCOUNTER — TELEPHONE (OUTPATIENT)
Dept: CARDIOLOGY | Facility: CLINIC | Age: 60
End: 2020-05-21

## 2020-05-21 NOTE — TELEPHONE ENCOUNTER
Left VM for patient to let him know that he might be receiving 2 ziopatches in the mail, if that happens- to mail one of them back to the company. Gave callback with any questions.    Rafia Sunshine, CMA

## 2020-05-27 ENCOUNTER — MYC MEDICAL ADVICE (OUTPATIENT)
Dept: CARDIOLOGY | Facility: CLINIC | Age: 60
End: 2020-05-27

## 2020-05-28 ENCOUNTER — ALLIED HEALTH/NURSE VISIT (OUTPATIENT)
Dept: CARDIOLOGY | Facility: CLINIC | Age: 60
End: 2020-05-28
Attending: INTERNAL MEDICINE
Payer: MEDICARE

## 2020-05-28 DIAGNOSIS — R00.2 PALPITATIONS: ICD-10-CM

## 2020-05-28 NOTE — TELEPHONE ENCOUNTER
Patient called the [employer]oc[employer]. He said that he was having problems with his Zio Patch monitor. He stated that after he placed the monitor and pressed the button to activate it did not flash a green light to indicate it was activated, so the patient took it off thinking that it was defective. I tried to help walk him through putting the monitor back on, but he said the adhesive had folded in on itself and can not be reattached.    I told the patient I can have another monitor sent out to him. It will take a few days to be shipped, so he can expect it early next week. I instructed him to put the monitor back into the box and place it into his mail box to be sent back.     I called [employer] and told them what happen. They said they would place a hold on the old monitor and send him out a new one. They told me the patient will not be charged for two monitors.     Jean-Pierre

## 2020-05-29 ENCOUNTER — COMMUNICATION - HEALTHEAST (OUTPATIENT)
Dept: CARDIOLOGY | Facility: CLINIC | Age: 60
End: 2020-05-29

## 2020-05-29 ENCOUNTER — SURGERY - HEALTHEAST (OUTPATIENT)
Dept: CARDIOLOGY | Facility: CLINIC | Age: 60
End: 2020-05-29

## 2020-05-29 ENCOUNTER — AMBULATORY - HEALTHEAST (OUTPATIENT)
Dept: CARDIOLOGY | Facility: CLINIC | Age: 60
End: 2020-05-29

## 2020-05-29 DIAGNOSIS — Z11.59 ENCOUNTER FOR SCREENING FOR OTHER VIRAL DISEASES: ICD-10-CM

## 2020-05-29 DIAGNOSIS — R94.39 ABNORMAL CARDIOVASCULAR STRESS TEST: ICD-10-CM

## 2020-05-29 DIAGNOSIS — I25.10 CORONARY ARTERY DISEASE INVOLVING NATIVE CORONARY ARTERY OF NATIVE HEART WITHOUT ANGINA PECTORIS: ICD-10-CM

## 2020-05-29 NOTE — TELEPHONE ENCOUNTER
Spoke with patient to follow up on chest pain and going into the ED yesterday. Patient states that he is at [facility] and they did a stress test and are now recommendation [facility] for further evaluation.     Patient is currently still in the hospital and will call cardiology once he has been discharge to follow up on status.     Patient states understanding and agrees to call with any questions or concerns.

## 2020-05-31 ENCOUNTER — AMBULATORY - HEALTHEAST (OUTPATIENT)
Dept: FAMILY MEDICINE | Facility: CLINIC | Age: 60
End: 2020-05-31

## 2020-05-31 DIAGNOSIS — Z11.59 ENCOUNTER FOR SCREENING FOR OTHER VIRAL DISEASES: ICD-10-CM

## 2020-06-01 ENCOUNTER — AMBULATORY - HEALTHEAST (OUTPATIENT)
Dept: FAMILY MEDICINE | Facility: CLINIC | Age: 60
End: 2020-06-01

## 2020-06-01 DIAGNOSIS — Z20.822 ENCOUNTER FOR LABORATORY TESTING FOR COVID-19 VIRUS: ICD-10-CM

## 2020-06-01 LAB
SARS-COV-2 PCR COMMENT: NORMAL
SARS-COV-2 RNA SPEC QL NAA+PROBE: NEGATIVE
SARS-COV-2 VIRUS SPECIMEN SOURCE: NORMAL

## 2020-06-02 ENCOUNTER — SURGERY - HEALTHEAST (OUTPATIENT)
Dept: CARDIOLOGY | Facility: CLINIC | Age: 60
End: 2020-06-02

## 2020-06-02 ENCOUNTER — SURGERY - HEALTHEAST (OUTPATIENT)
Dept: ADMINISTRATIVE | Facility: CLINIC | Age: 60
End: 2020-06-02

## 2020-06-02 DIAGNOSIS — I20.0 UNSTABLE ANGINA (H): ICD-10-CM

## 2020-06-02 DIAGNOSIS — I25.10 CAD (CORONARY ARTERY DISEASE): ICD-10-CM

## 2020-06-02 ASSESSMENT — MIFFLIN-ST. JEOR
SCORE: 1592.37
SCORE: 1592.37

## 2020-06-03 ENCOUNTER — TELEPHONE (OUTPATIENT)
Dept: TRANSPLANT | Facility: CLINIC | Age: 60
End: 2020-06-03

## 2020-06-03 NOTE — TELEPHONE ENCOUNTER
Call returned, Jerad Ross at [facility], plan CABG for on 6/8 (Plavix stopped on admission). MD reports Cr is stable at 0.75. RNCC confirmed current immunosuppression. No acute concerns, MD alerting us of plan. RNCC gave provider to provider number if any questions regarding transplant arise.

## 2020-06-03 NOTE — TELEPHONE ENCOUNTER
Voicemail  Date/Time: 6/3/2020-11:22am  Reason for call:   Please connect with dr PONCE at [facility] regarding the pt having bypass surgery

## 2020-06-05 ENCOUNTER — COMMUNICATION - HEALTHEAST (OUTPATIENT)
Dept: ADMINISTRATIVE | Facility: CLINIC | Age: 60
End: 2020-06-05

## 2020-06-05 ENCOUNTER — ANESTHESIA - HEALTHEAST (OUTPATIENT)
Dept: SURGERY | Facility: CLINIC | Age: 60
End: 2020-06-05

## 2020-06-05 ASSESSMENT — MIFFLIN-ST. JEOR
SCORE: 1572.41
SCORE: 1572.41

## 2020-06-06 ASSESSMENT — MIFFLIN-ST. JEOR
SCORE: 1558.8
SCORE: 1558.8

## 2020-06-07 ASSESSMENT — MIFFLIN-ST. JEOR
SCORE: 1549.73
SCORE: 1549.73

## 2020-06-08 ENCOUNTER — SURGERY - HEALTHEAST (OUTPATIENT)
Dept: SURGERY | Facility: CLINIC | Age: 60
End: 2020-06-08

## 2020-06-08 ASSESSMENT — MIFFLIN-ST. JEOR
SCORE: 1551.09
SCORE: 1551.09

## 2020-06-09 ENCOUNTER — DOCUMENTATION ONLY (OUTPATIENT)
Dept: OTHER | Facility: CLINIC | Age: 60
End: 2020-06-09

## 2020-06-09 ENCOUNTER — AMBULATORY - HEALTHEAST (OUTPATIENT)
Dept: OTHER | Facility: CLINIC | Age: 60
End: 2020-06-09

## 2020-06-09 ASSESSMENT — MIFFLIN-ST. JEOR
SCORE: 1623.66
SCORE: 1623.66

## 2020-06-10 ASSESSMENT — MIFFLIN-ST. JEOR
SCORE: 1651.33
SCORE: 1651.33

## 2020-06-11 ASSESSMENT — MIFFLIN-ST. JEOR
SCORE: 1617.31
SCORE: 1617.31

## 2020-06-12 ASSESSMENT — MIFFLIN-ST. JEOR
SCORE: 1590.1
SCORE: 1590.1

## 2020-06-13 ASSESSMENT — MIFFLIN-ST. JEOR
SCORE: 1564.24
SCORE: 1564.24

## 2020-06-16 ENCOUNTER — COMMUNICATION - HEALTHEAST (OUTPATIENT)
Dept: CARDIOLOGY | Facility: CLINIC | Age: 60
End: 2020-06-16

## 2020-06-16 ENCOUNTER — OFFICE VISIT - HEALTHEAST (OUTPATIENT)
Dept: GERIATRICS | Facility: CLINIC | Age: 60
End: 2020-06-16

## 2020-06-16 ENCOUNTER — COMMUNICATION - HEALTHEAST (OUTPATIENT)
Dept: GERIATRICS | Facility: CLINIC | Age: 60
End: 2020-06-16

## 2020-06-16 ENCOUNTER — RECORDS - HEALTHEAST (OUTPATIENT)
Dept: LAB | Facility: CLINIC | Age: 60
End: 2020-06-16

## 2020-06-16 DIAGNOSIS — I21.4 NSTEMI (NON-ST ELEVATED MYOCARDIAL INFARCTION) (H): ICD-10-CM

## 2020-06-16 DIAGNOSIS — B18.1 CHRONIC VIRAL HEPATITIS B WITHOUT DELTA AGENT AND WITHOUT COMA (H): ICD-10-CM

## 2020-06-16 DIAGNOSIS — K21.9 GASTROESOPHAGEAL REFLUX DISEASE WITHOUT ESOPHAGITIS: ICD-10-CM

## 2020-06-16 DIAGNOSIS — I10 ESSENTIAL HYPERTENSION: ICD-10-CM

## 2020-06-16 DIAGNOSIS — R52 PAIN MANAGEMENT: ICD-10-CM

## 2020-06-16 DIAGNOSIS — N18.6 END STAGE RENAL DISEASE (H): ICD-10-CM

## 2020-06-16 LAB
ANION GAP SERPL CALCULATED.3IONS-SCNC: 9 MMOL/L (ref 5–18)
BUN SERPL-MCNC: 24 MG/DL (ref 8–22)
CALCIUM SERPL-MCNC: 9.6 MG/DL (ref 8.5–10.5)
CHLORIDE BLD-SCNC: 97 MMOL/L (ref 98–107)
CO2 SERPL-SCNC: 28 MMOL/L (ref 22–31)
CREAT SERPL-MCNC: 0.96 MG/DL (ref 0.7–1.3)
ERYTHROCYTE [DISTWIDTH] IN BLOOD BY AUTOMATED COUNT: 15.6 % (ref 11–14.5)
GFR SERPL CREATININE-BSD FRML MDRD: >60 ML/MIN/1.73M2
GLUCOSE BLD-MCNC: 106 MG/DL (ref 70–125)
HCT VFR BLD AUTO: 40.4 % (ref 40–54)
HGB BLD-MCNC: 12.7 G/DL (ref 14–18)
MCH RBC QN AUTO: 28.5 PG (ref 27–34)
MCHC RBC AUTO-ENTMCNC: 31.4 G/DL (ref 32–36)
MCV RBC AUTO: 91 FL (ref 80–100)
PLATELET # BLD AUTO: 524 THOU/UL (ref 140–440)
PMV BLD AUTO: 10.2 FL (ref 8.5–12.5)
POTASSIUM BLD-SCNC: 4.1 MMOL/L (ref 3.5–5)
RBC # BLD AUTO: 4.45 MILL/UL (ref 4.4–6.2)
SODIUM SERPL-SCNC: 134 MMOL/L (ref 136–145)
WBC: 10.9 THOU/UL (ref 4–11)

## 2020-06-18 ENCOUNTER — OFFICE VISIT - HEALTHEAST (OUTPATIENT)
Dept: GERIATRICS | Facility: CLINIC | Age: 60
End: 2020-06-18

## 2020-06-18 DIAGNOSIS — I10 ESSENTIAL HYPERTENSION: ICD-10-CM

## 2020-06-18 DIAGNOSIS — I21.4 NSTEMI (NON-ST ELEVATED MYOCARDIAL INFARCTION) (H): ICD-10-CM

## 2020-06-18 DIAGNOSIS — R52 PAIN MANAGEMENT: ICD-10-CM

## 2020-06-18 DIAGNOSIS — R53.81 DEBILITY: ICD-10-CM

## 2020-06-19 ENCOUNTER — OFFICE VISIT - HEALTHEAST (OUTPATIENT)
Dept: GERIATRICS | Facility: CLINIC | Age: 60
End: 2020-06-19

## 2020-06-19 DIAGNOSIS — N18.6 END STAGE RENAL DISEASE (H): ICD-10-CM

## 2020-06-19 DIAGNOSIS — I21.4 NSTEMI (NON-ST ELEVATED MYOCARDIAL INFARCTION) (H): ICD-10-CM

## 2020-06-19 DIAGNOSIS — I10 ESSENTIAL HYPERTENSION: ICD-10-CM

## 2020-06-19 DIAGNOSIS — B18.1 CHRONIC VIRAL HEPATITIS B WITHOUT DELTA AGENT AND WITHOUT COMA (H): ICD-10-CM

## 2020-06-19 DIAGNOSIS — I25.810 CORONARY ARTERY DISEASE INVOLVING OTHER CORONARY ARTERY BYPASS GRAFT, ANGINA PRESENCE UNSPECIFIED: ICD-10-CM

## 2020-06-19 RX ORDER — PANTOPRAZOLE SODIUM 40 MG/1
40 TABLET, DELAYED RELEASE ORAL DAILY
Qty: 90 TABLET | Refills: 2 | Status: SHIPPED | OUTPATIENT
Start: 2020-06-19 | End: 2021-07-15

## 2020-06-19 NOTE — TELEPHONE ENCOUNTER
Last Clinic Visit: 3/23/2020  [facility]

## 2020-06-23 ENCOUNTER — OFFICE VISIT - HEALTHEAST (OUTPATIENT)
Dept: GERIATRICS | Facility: CLINIC | Age: 60
End: 2020-06-23

## 2020-06-23 DIAGNOSIS — I10 ESSENTIAL HYPERTENSION: ICD-10-CM

## 2020-06-23 DIAGNOSIS — I21.4 NSTEMI (NON-ST ELEVATED MYOCARDIAL INFARCTION) (H): ICD-10-CM

## 2020-06-23 DIAGNOSIS — R52 PAIN MANAGEMENT: ICD-10-CM

## 2020-06-25 ENCOUNTER — RECORDS - HEALTHEAST (OUTPATIENT)
Dept: LAB | Facility: CLINIC | Age: 60
End: 2020-06-25

## 2020-06-25 ENCOUNTER — OFFICE VISIT - HEALTHEAST (OUTPATIENT)
Dept: GERIATRICS | Facility: CLINIC | Age: 60
End: 2020-06-25

## 2020-06-25 DIAGNOSIS — R52 PAIN MANAGEMENT: ICD-10-CM

## 2020-06-25 DIAGNOSIS — I10 ESSENTIAL HYPERTENSION: ICD-10-CM

## 2020-06-25 DIAGNOSIS — I21.4 NSTEMI (NON-ST ELEVATED MYOCARDIAL INFARCTION) (H): ICD-10-CM

## 2020-06-25 DIAGNOSIS — R53.81 PHYSICAL DEBILITY: ICD-10-CM

## 2020-06-26 ENCOUNTER — OFFICE VISIT - HEALTHEAST (OUTPATIENT)
Dept: GERIATRICS | Facility: CLINIC | Age: 60
End: 2020-06-26

## 2020-06-26 ENCOUNTER — COMMUNICATION - HEALTHEAST (OUTPATIENT)
Dept: GERIATRICS | Facility: CLINIC | Age: 60
End: 2020-06-26

## 2020-06-26 DIAGNOSIS — B18.1 CHRONIC VIRAL HEPATITIS B WITHOUT DELTA AGENT AND WITHOUT COMA (H): ICD-10-CM

## 2020-06-26 DIAGNOSIS — I21.4 NSTEMI (NON-ST ELEVATED MYOCARDIAL INFARCTION) (H): ICD-10-CM

## 2020-06-26 DIAGNOSIS — N18.6 END STAGE RENAL DISEASE (H): ICD-10-CM

## 2020-06-26 DIAGNOSIS — I25.10 CORONARY ARTERY DISEASE INVOLVING NATIVE CORONARY ARTERY OF NATIVE HEART WITHOUT ANGINA PECTORIS: ICD-10-CM

## 2020-06-26 DIAGNOSIS — I10 ESSENTIAL HYPERTENSION: ICD-10-CM

## 2020-06-26 LAB
ANION GAP SERPL CALCULATED.3IONS-SCNC: 12 MMOL/L (ref 5–18)
BUN SERPL-MCNC: 10 MG/DL (ref 8–22)
CALCIUM SERPL-MCNC: 9.5 MG/DL (ref 8.5–10.5)
CHLORIDE BLD-SCNC: 104 MMOL/L (ref 98–107)
CO2 SERPL-SCNC: 24 MMOL/L (ref 22–31)
CREAT SERPL-MCNC: 0.71 MG/DL (ref 0.7–1.3)
ERYTHROCYTE [DISTWIDTH] IN BLOOD BY AUTOMATED COUNT: 16.7 % (ref 11–14.5)
GFR SERPL CREATININE-BSD FRML MDRD: >60 ML/MIN/1.73M2
GLUCOSE BLD-MCNC: 88 MG/DL (ref 70–125)
HCT VFR BLD AUTO: 39.4 % (ref 40–54)
HGB BLD-MCNC: 11.9 G/DL (ref 14–18)
MCH RBC QN AUTO: 28.8 PG (ref 27–34)
MCHC RBC AUTO-ENTMCNC: 30.2 G/DL (ref 32–36)
MCV RBC AUTO: 95 FL (ref 80–100)
PLATELET # BLD AUTO: 635 THOU/UL (ref 140–440)
PMV BLD AUTO: 9.9 FL (ref 8.5–12.5)
POTASSIUM BLD-SCNC: 3.6 MMOL/L (ref 3.5–5)
RBC # BLD AUTO: 4.13 MILL/UL (ref 4.4–6.2)
SODIUM SERPL-SCNC: 140 MMOL/L (ref 136–145)
WBC: 6.9 THOU/UL (ref 4–11)

## 2020-06-30 ENCOUNTER — OFFICE VISIT - HEALTHEAST (OUTPATIENT)
Dept: CARDIOLOGY | Facility: CLINIC | Age: 60
End: 2020-06-30

## 2020-06-30 ENCOUNTER — OFFICE VISIT - HEALTHEAST (OUTPATIENT)
Dept: GERIATRICS | Facility: CLINIC | Age: 60
End: 2020-06-30

## 2020-06-30 DIAGNOSIS — I10 ESSENTIAL HYPERTENSION: ICD-10-CM

## 2020-06-30 DIAGNOSIS — Z95.1 S/P CABG (CORONARY ARTERY BYPASS GRAFT): ICD-10-CM

## 2020-06-30 DIAGNOSIS — I21.4 NSTEMI (NON-ST ELEVATED MYOCARDIAL INFARCTION) (H): ICD-10-CM

## 2020-06-30 DIAGNOSIS — Z94.0 S/P KIDNEY TRANSPLANT: ICD-10-CM

## 2020-07-01 ENCOUNTER — TELEPHONE (OUTPATIENT)
Dept: OPHTHALMOLOGY | Facility: CLINIC | Age: 60
End: 2020-07-01

## 2020-07-01 NOTE — TELEPHONE ENCOUNTER
Vandana Jerad Mayo [phone]    H/o Ocular hypertension    Left message at 0925  Reviewed Dr. Miller would be a good provider in Dr. Rizzo and now Dr. Rojas's absence end of month    Reviewed able to see next week Tuesday with Dr. Vargas with Dr. Miller staffing    Tentatively scheduled and provided date/time next week Tuesday and direct number to review/confirm appt    Mike Ruano RN 9:26 AM 07/02/20        [facility] Call Center    Phone Message    May a detailed message be left on voicemail: yes     Reason for Call: Other: Pt is requesting to schedule a return glaucoma appt. Pt of Dr. Rizzo.  Pt is also requesting a letter to be sent to the DMV.  Please follow up with the Pt.      Action Taken: Message routed to:  [facility] EYE    Travel Screening: Not Applicable

## 2020-07-03 ENCOUNTER — COMMUNICATION - HEALTHEAST (OUTPATIENT)
Dept: GERIATRICS | Facility: CLINIC | Age: 60
End: 2020-07-03

## 2020-07-03 DIAGNOSIS — Z95.1 S/P CABG (CORONARY ARTERY BYPASS GRAFT): ICD-10-CM

## 2020-07-06 ENCOUNTER — VIRTUAL VISIT (OUTPATIENT)
Dept: OTHER | Facility: OUTPATIENT CENTER | Age: 60
End: 2020-07-06
Payer: MEDICARE

## 2020-07-06 ENCOUNTER — AMBULATORY - HEALTHEAST (OUTPATIENT)
Dept: GERIATRICS | Facility: CLINIC | Age: 60
End: 2020-07-06

## 2020-07-06 DIAGNOSIS — F33.1 MODERATE EPISODE OF RECURRENT MAJOR DEPRESSIVE DISORDER (H): Primary | ICD-10-CM

## 2020-07-06 DIAGNOSIS — F91.8 CONDUCT DISORDER, UNDIFFERENTIATED TYPE: ICD-10-CM

## 2020-07-07 ENCOUNTER — OFFICE VISIT (OUTPATIENT)
Dept: OPHTHALMOLOGY | Facility: CLINIC | Age: 60
End: 2020-07-07
Attending: OPHTHALMOLOGY
Payer: COMMERCIAL

## 2020-07-07 DIAGNOSIS — H40.053 BORDERLINE GLAUCOMA WITH OCULAR HYPERTENSION, BILATERAL: Primary | ICD-10-CM

## 2020-07-07 DIAGNOSIS — H47.011 ACUTE ISCHEMIC OPTIC NEUROPATHY OF RIGHT EYE: ICD-10-CM

## 2020-07-07 DIAGNOSIS — Z96.1 PSEUDOPHAKIA OF BOTH EYES: ICD-10-CM

## 2020-07-07 PROCEDURE — 92133 CPTRZD OPH DX IMG PST SGM ON: CPT | Mod: ZF | Performed by: STUDENT IN AN ORGANIZED HEALTH CARE EDUCATION/TRAINING PROGRAM

## 2020-07-07 PROCEDURE — G0463 HOSPITAL OUTPT CLINIC VISIT: HCPCS | Mod: 25

## 2020-07-07 PROCEDURE — 92083 EXTENDED VISUAL FIELD XM: CPT | Mod: ZF | Performed by: STUDENT IN AN ORGANIZED HEALTH CARE EDUCATION/TRAINING PROGRAM

## 2020-07-07 PROCEDURE — 92015 DETERMINE REFRACTIVE STATE: CPT | Mod: GY,ZF

## 2020-07-07 PROCEDURE — 92081 LIMITED VISUAL FIELD XM: CPT | Mod: ZF | Performed by: STUDENT IN AN ORGANIZED HEALTH CARE EDUCATION/TRAINING PROGRAM

## 2020-07-07 PROCEDURE — 65855 TRABECULOPLASTY LASER SURG: CPT | Mod: LT,ZF | Performed by: STUDENT IN AN ORGANIZED HEALTH CARE EDUCATION/TRAINING PROGRAM

## 2020-07-07 ASSESSMENT — REFRACTION_WEARINGRX
SPECS_TYPE: SVL
OD_SPHERE: BALANCE
OS_AXIS: 170
OS_CYLINDER: +2.25
OS_AXIS: 170
OD_SPHERE: BALANCE
OS_SPHERE: -0.50
OS_CYLINDER: +2.25
OS_AXIS: 170
OS_ADD: +2.50
SPECS_TYPE: SVL
OD_SPHERE: BALANCE
OS_CYLINDER: +2.25
OS_SPHERE: -0.50
OS_SPHERE: +2.00

## 2020-07-07 ASSESSMENT — VISUAL ACUITY
OS_SC: 20/40-2
OS_CC: 20/25-
OD_CC: 20/400
OD_SC: 20/400
CORRECTION_TYPE: GLASSES
OS_PH_SC: 20/25-2/+2
METHOD: SNELLEN - LINEAR

## 2020-07-07 ASSESSMENT — EXTERNAL EXAM - LEFT EYE: OS_EXAM: NORMAL

## 2020-07-07 ASSESSMENT — REFRACTION_MANIFEST
OS_AXIS: 170
OS_ADD: +2.50
OD_SPHERE: BALANCE
OS_CYLINDER: +2.25
OS_SPHERE: -0.50

## 2020-07-07 ASSESSMENT — CONF VISUAL FIELD
OD_INFERIOR_NASAL_RESTRICTION: 2
OS_NORMAL: 1
OD_SUPERIOR_TEMPORAL_RESTRICTION: 2
OD_SUPERIOR_NASAL_RESTRICTION: 2
OD_INFERIOR_TEMPORAL_RESTRICTION: 2

## 2020-07-07 ASSESSMENT — TONOMETRY
IOP_METHOD: APPLANATION
OS_IOP_MMHG: 21
OD_IOP_MMHG: 26
IOP_METHOD: APPLANATION
OS_IOP_MMHG: 22
OD_IOP_MMHG: 26

## 2020-07-07 ASSESSMENT — SLIT LAMP EXAM - LIDS
COMMENTS: NORMAL
COMMENTS: NORMAL

## 2020-07-07 ASSESSMENT — CUP TO DISC RATIO: OS_RATIO: 0.65

## 2020-07-07 ASSESSMENT — EXTERNAL EXAM - RIGHT EYE: OD_EXAM: NORMAL

## 2020-07-07 NOTE — PROGRESS NOTES
CC:   Here for glaucoma follow up/DMV form      HPI:  Here for follow up glaucoma, also needs DMV forms filled out today for his drivers license.  He has not driven for a while but he would like to begin driving again.  Using Latanoprost nightly each eye - unsure if he used it last night or not.  He would like a new glasses prescription today as well.    Yessy Ramirez, COT 12:17 PM 07/07/2020     Review of systems for the eyes was negative other than the pertinent positives/negatives listed in the HPI.      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 Borderline glaucoma with ocular hypertension, bilateral    2. Pseudophakia of both eyes    3. 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  Right eye poor vision likely due to AION overlay and remains stable.    - IOP today:  26/22  - Tmax:  20/19  - IOP target: Normotension  - Pachy:  Needs  - C/D:  0.2/0.65 pale  - Gonio:  Needs  - Trauma history:  Negative  - VF:  LVC right eye generalized depression (stable), 24-2 left eye progression from 2019  - RNFL: Right eye diffuse thinning; left eye diffuse thinning (stable)  - FH:  Negative    IOP today is above goal.  Discussed beginning cosopt 2x daily both eyes v. SLT left eye today.  RBA covered and patient wishes to proceed with SLT left eye today 7/7/2020.    - Continue latanoprost at bedtime each eye   - 's form completed with 's field  - MRx explained and dispensed   - Follow up in 6 weeks for IOP check + Pachy's      Patient disposition:   Return in about 6 weeks (around 8/18/2020) for V/T and IOP check.       Arron Vargas, PGY3  Ophthalmology  Resident    Brief Operative Note:    Date of Service: July 7, 2020    Attending: Wood Miller M.D.   Assistant: Arron Vargas MD    Preoperative diagnosis: Glaucoma, left eye  Postoperative diagnosis: same  Procedure: Selective laser trabeculoplasty left eye  Anesthesia: Topical lidocaine  Complications: none    After informed consent was obtained, the patient was brought to the [facility] laser room and given one drop of proparacaine. The Robbi 3 mirror goniolens was used to view the trabecular meshwork. Selective laser trabeculoplasty was performed 360 degrees using 0.7 mJ for power setting; 96 laser burns were placed over 360 degrees. The patient tolerated the procedure well. There were no complications.  The patient was given one drop of Iopodine and was asked to return in one half hour for intraocular pressure check. If the intraocular pressure was less than 25, the patient was allowed to leave.              Attending Physician Attestation:  Complete documentation of historical and exam elements from today's encounter can be found in the full encounter summary report (not reduplicated in this progress note).  I personally obtained the chief complaint(s) and history of present illness.  I confirmed and edited as necessary the review of systems, past medical/surgical history, family history, social history, and examination findings as documented by others; and I examined the patient myself.  I personally reviewed the relevant tests, images, and reports as documented above.  I formulated and edited as necessary the assessment and plan and discussed the findings and management plan with the patient and family. .Attending Physician Image/Tesing Attestation: I personally reviewed the ophthalmic test(s) associated with this encounter, agree with the interpretation(s) as documented by the resident/fellow, and have edited the corresponding report(s) as necessary.  Attending Physician Procedure Attestation: I was present  for the entire procedure      - Wood Miller MD

## 2020-07-07 NOTE — PROGRESS NOTES
"[facility] -  Case Progress Note      Client Name: Jerad Ross  YOB: 1960  MRN:  [id number]  Treating Provider: Julita Wisdom LP  Type of Session: Individual  Present in Session: client  Number of Minutes:  45    Start time: 11:10  End time: 11:55    Attempted video through [employer] but client could not connect so we switched to telephone.    The following was reviewed with the patient.   \"We have found that certain health care needs can be provided without the need for a face to face visit.  This service lets us provide the care you need with a phone conversation. I will have full access to your [facility] medical record during this entire phone call.   I will be taking notes for your medical record. Since this is like an office visit, we will bill your insurance company for this service. There are potential benefits and risks of telephone visits (e.g. limits to patient confidentiality) that differ from in-person visits.?  Confidentiality still applies for telephone services, and nobody will record the visit.  It is important to be in a quiet, private space that is free of distractions (including cell phone or other devices) during the visit.??If during the course of the call I believe a telephone visit is not appropriate, you will not be charged for this service\"    Consent has been obtained for this service by care team member: Yes      Date of Service:7/06/20     Health Maintenance Summary - Mental Health Treatment Plan       Status Date      MENTAL HEALTH TX PLAN Next Due 10/21/2020      Done 11/21/2019 HIM MENTAL HEALTH TX PLAN SCAN        Current Symptoms/Status:  Fatigue, low energy, anxiety and stress about recent heart bypass surgery and recovery, depression, low energy, thoughts and urges to act out sexually,  conflict and tension with his wife due to his past sexual behavior and the impact on the relationship,  financial distress.    Progress Toward " Treatment Goals:   Client reported progress with maintaining boundaries during health crisis.    Intervention: Modality and Description:  Provided support and feedback. Used CBT to process thoughts and urges, depression, and health issues.  Client shared that he has been feeling some fatigue issues for a while.  He stated that he had a stress test a while back and it came back as negative.  He shared he continued to struggle and then started having some chest pain.  He went in to the doctor and they did not others stress test and identified significant blockage in his arteries.  He shared they kept him in the hospital until he could schedule bypass surgery.  He shared he had bypass surgery for the arteries on one side of his heart, but the other side still has blockage.  He shared they are likely going to have to put stents in the other arteries.  We processed this stress of this healthwise for him.  He shared he is feeling better physically even though he is in the recovery process still.  He just recently went back to the sober house.  He stated he has been able to maintain his boundaries through this whole process.  He shared his wife came to visit him in the hospital and he felt good about it.  He shared he wanted to work on how to continue addressing the relationship.  He shared about some recent conversations with her.  Discussed the process of doing some journaling when he has interactions with her to reflect on his interpretations and emotions so that he can follow-up with her and can continue to determine where he wants to go with this relationship.    Response to Intervention:  Client reported gaining insight and validation.    Assignment:  Contact his wife to see if she will play a game online with him.    Interactive Complexity:  There are four specific communication difficulties that complicate the work of the primary psychiatric procedure.  Interactive complexity (+51021) may be reported when at least  one of these difficulties is present.    Communication difficulties present during current the psychiatric procedure include:  1. None.    Diagnosis:  Encounter Diagnoses   Name Primary?     Moderate episode of recurrent major depressive disorder (H) Yes     Other Specified Disruptive, Impulse-Control, and Conduct Disorder (hypersexuality)          Plan / Need for Future Services:  Return for therapy in 2 weeks.      Julita Wisdom Psyd,  LP

## 2020-07-10 ENCOUNTER — AMBULATORY - HEALTHEAST (OUTPATIENT)
Dept: CARDIAC REHAB | Facility: CLINIC | Age: 60
End: 2020-07-10

## 2020-07-10 DIAGNOSIS — Z95.1 S/P CABG (CORONARY ARTERY BYPASS GRAFT): ICD-10-CM

## 2020-07-13 ENCOUNTER — VIRTUAL VISIT (OUTPATIENT)
Dept: PSYCHIATRY | Facility: CLINIC | Age: 60
End: 2020-07-13
Attending: NURSE PRACTITIONER
Payer: MEDICARE

## 2020-07-13 ENCOUNTER — VIRTUAL VISIT (OUTPATIENT)
Dept: FAMILY MEDICINE | Facility: CLINIC | Age: 60
End: 2020-07-13
Payer: MEDICARE

## 2020-07-13 ENCOUNTER — TELEPHONE (OUTPATIENT)
Dept: PSYCHIATRY | Facility: CLINIC | Age: 60
End: 2020-07-13

## 2020-07-13 DIAGNOSIS — F33.41 RECURRENT MAJOR DEPRESSIVE DISORDER, IN PARTIAL REMISSION (H): ICD-10-CM

## 2020-07-13 DIAGNOSIS — Z09 HOSPITAL DISCHARGE FOLLOW-UP: Primary | ICD-10-CM

## 2020-07-13 RX ORDER — POLYETHYLENE GLYCOL 3350 17 G/17G
17 POWDER, FOR SOLUTION ORAL DAILY PRN
COMMUNITY
Start: 2020-06-13

## 2020-07-13 RX ORDER — NITROGLYCERIN 0.4 MG/1
0.4 TABLET SUBLINGUAL
COMMUNITY
Start: 2020-05-29 | End: 2023-02-08

## 2020-07-13 RX ORDER — ROSUVASTATIN CALCIUM 20 MG/1
TABLET, COATED ORAL
COMMUNITY
Start: 2020-07-04 | End: 2021-07-15

## 2020-07-13 RX ORDER — METOPROLOL TARTRATE 25 MG/1
TABLET, FILM COATED ORAL
COMMUNITY
Start: 2020-07-04 | End: 2021-11-30

## 2020-07-13 RX ORDER — FLUOXETINE 40 MG/1
40 CAPSULE ORAL DAILY
Qty: 90 CAPSULE | Refills: 0 | Status: SHIPPED | OUTPATIENT
Start: 2020-07-13 | End: 2020-10-08

## 2020-07-13 RX ORDER — FLUOXETINE 10 MG/1
10 CAPSULE ORAL DAILY
Qty: 90 CAPSULE | Refills: 0 | Status: SHIPPED | OUTPATIENT
Start: 2020-07-13 | End: 2020-10-08

## 2020-07-13 NOTE — PATIENT INSTRUCTIONS
Thank you for coming to the PSYCHIATRY CLINIC.    Lab Testing:  If you had lab testing today and your results are reassuring or normal they will be mailed to you or sent through [employer] within 7 days. If the lab tests need quick action we will call you with the results. The phone number we will call with results is # [phone] (home) . If this is not the best number please call our clinic and change the number.    Medication Refills:  If you need any refills please call your pharmacy and they will contact us. Our fax number for refills is [phone]. Please allow three business for refill processing. If you need to  your refill at a new pharmacy, please contact the new pharmacy directly. The new pharmacy will help you get your medications transferred.     Scheduling:  If you have any concerns about today's visit or wish to schedule another appointment please call our office during normal business hours [phone] (8-5:00 M-F)    Contact Us:  Please call [phone] during business hours (8-5:00 M-F).  If after clinic hours, or on the weekend, please call  [phone].    Financial Assistance [phone]  [email or web address] Billing [phone]  Central Billing Office, [email or web address]: [phone]  [place] Billing [phone]  Medical Records [phone]      MENTAL HEALTH CRISIS NUMBERS:  For a medical emergency please call  911 or go to the nearest ER.     [facility]:   [facility] -[phone]   Crisis Residence [facility] Residence -[phone]   Walk-In Counseling [facility] -[phone]   COPE 24/7 [place] Mobile Team -[phone] (adults)/[phone] (child)  CHILD: [facility] needs assessment team - [phone]      [facility]:   [facility] - [phone]   Walk-in counseling [facility] - [phone]   Walk-in counseling [facility] - [phone]   Crisis Residence [facility] - [phone]  Urgent  [facility]-[phone] mobile unit/ 24/7 crisis line    National Crisis Numbers:   National Suicide Prevention Lifeline: 7-407-879-TALK (410-399-7762)  Poison Control Center - 2-958-103-3981  [employer]/resources for a list of additional resources (SOS)  Trans Lifeline a hotline for transgender people 5-155-287-8026  [employer] a hotline for LGBT youth 1-947.609.9335  Crisis Text Line: For any crisis 24/7   To: 954632  see www.crisistextline.org  - IF MAKING A CALL FEELS TOO HARD, send a text!         Again thank you for choosing PSYCHIATRY CLINIC and please let us know how we can best partner with you to improve you and your family's health.    You may be receiving a survey regarding this appointment. We would love to have your feedback, both positive and negative. The survey is done by an external company, so your answers are anonymous.

## 2020-07-13 NOTE — PROGRESS NOTES
"Jerad Ross is a 60 year old male who is being evaluated via a billable telephone visit.      The patient has been notified of following:     \"This telephone visit will be conducted via a call between you and your physician/provider. We have found that certain health care needs can be provided without the need for a physical exam.  This service lets us provide the care you need with a short phone conversation.  If a prescription is necessary we can send it directly to your pharmacy.  If lab work is needed we can place an order for that and you can then stop by our lab to have the test done at a later time.    Telephone visits are billed at different rates depending on your insurance coverage. During this emergency period, for some insurers they may be billed the same as an in-person visit.  Please reach out to your insurance provider with any questions.    If during the course of the call the physician/provider feels a telephone visit is not appropriate, you will not be charged for this service.\"    Patient has given verbal consent for Telephone visit?  Yes    What phone number would you like to be contacted at? [phone]     How would you like to obtain your AVS? MyChart     Doing well  Home   Did cabg, rehab stay, just started cardiac rehab    Sees cardio next week    Reviewed inpt notes    Past Medical History:   Diagnosis Date     AION (acute ischemic optic neuropathy)      Anemia in chronic renal disease      Avascular necrosis of bones of both hips (H)     s/p bilateral hip replacements     Basal cell carcinoma      Chronic hepatitis B (H)      Clostridium difficile colitis      Coronary artery disease involving native coronary artery of native heart without angina pectoris 6/17/2014    Coronary angiogram 6/17/14: Severe distal 3-vessel disease involving small vessels, not amenable to PCI or CABG.     CRP elevated      Depression      Diverticulosis      Dyslipidemia      FSGS (focal segmental " glomerulosclerosis)      Gastric ulcer with hemorrhage 2/12/12     GERD (gastroesophageal reflux disease)      Glaucoma     OHTN     Hemorrhoids      Hypertension secondary to other renal disorders      Hypogonadism in male      Immunosuppressed status (H)      Kidney replaced by transplant     focal glomerulosclerosis      NSTEMI (non-ST elevated myocardial infarction) (H) 6/17/2014     JULIANE (obstructive sleep apnea)     Doesn't use CPAP     Paracentral scotoma     LE     Secondary renal hyperparathyroidism (H)      Squamous cell carcinoma      Past Surgical History:   Procedure Laterality Date     APPENDECTOMY       Cardiac Bypass surgery  06/08/2020    [facility]      CATARACT IOL, RT/LT  4/19/2000    RE     CATARACT IOL, RT/LT  6/1/2000    LE     COLECTOMY SUBTOTAL  1983    10 cm, diverticulitis     COLONOSCOPY  2/13/2012    Procedure:COLONOSCOPY; Surgeon:SLOAN GALLARDO; Location: GI     COLONOSCOPY N/A 1/22/2020    Procedure: Colonoscopy, With Polypectomy And Biopsy;  Surgeon: Aston Kiran MD;  Location:  GI     ESOPHAGOSCOPY, GASTROSCOPY, DUODENOSCOPY (EGD), COMBINED  2/13/2012    Procedure:COMBINED ESOPHAGOSCOPY, GASTROSCOPY, DUODENOSCOPY (EGD); Surgeon:SLOAN GALLARDO; Location[facility]     EXTRACAPSULAR CATARACT EXTRATION WITH INTRAOCULAR LENS IMPLANT  4-20-10, 6-1-10    Rt, Lt     HERNIA REPAIR  1995    Lt inguinal     HIP SURGERY      1981, bilat MITZI, revised 2001, 2005     KIDNEY SURGERY  1978 and 1993    transplant     KNEE SURGERY  1983, 1987    bilat TKA     MOHS MICROGRAPHIC PROCEDURE       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, auxiliary spleen     TONSILLECTOMY       Current Outpatient Medications   Medication     acetaminophen (TYLENOL) 325 MG tablet     aspirin 81 MG tablet     BETA BLOCKER NOT PRESCRIBED, INTENTIONAL,     calcium citrate-vitamin D (CITRACAL) 315-200 MG-UNIT TABS     clopidogrel (PLAVIX) 75 MG tablet     entecavir (BARACLUDE) 0.5 MG tablet      FLUoxetine (PROZAC) 10 MG capsule     FLUoxetine (PROZAC) 40 MG capsule     isosorbide mononitrate (IMDUR) 30 MG 24 hr tablet     latanoprost (XALATAN) 0.005 % ophthalmic solution     metoprolol tartrate (LOPRESSOR) 25 MG tablet     Multiple Vitamins-Iron (ONE DAILY MULTIVITAMIN/IRON) TABS     mycophenolate (GENERIC EQUIVALENT) 250 MG capsule     nitroGLYcerin (NITROSTAT) 0.4 MG sublingual tablet     Omega-3 Fatty Acids (OMEGA 3 PO)     pantoprazole (PROTONIX) 40 MG EC tablet     polyethylene glycol (MIRALAX) 17 g packet     predniSONE (DELTASONE) 5 MG tablet     rosuvastatin (CRESTOR) 20 MG tablet     albuterol (PROAIR HFA) 108 (90 Base) MCG/ACT Inhaler     atorvastatin (LIPITOR) 20 MG tablet     azithromycin (ZITHROMAX) 250 MG tablet     benzonatate (TESSALON) 100 MG capsule     budesonide (PULMICORT FLEXHALER) 90 MCG/ACT inhaler     fluticasone (FLONASE) 50 MCG/ACT spray     fluticasone-salmeterol (ADVAIR) 100-50 MCG/DOSE inhaler     fluticasone-salmeterol (ADVAIR) 250-50 MCG/DOSE inhaler     Current Facility-Administered Medications   Medication     lidocaine 1% with EPINEPHrine 1:100,000 injection 3 mL     lidocaine 1% with EPINEPHrine 1:100,000 injection 3 mL     Allergies   Allergen Reactions     Penicillins Shortness Of Breath and Hives     Keflex [Cephalexin Hcl] Other (See Comments)     Pt could not recall reaction     Tetracycline Other (See Comments)     Patient could not recall reaction     Sulfa Drugs Rash     Exam  Awake alert NAD    A/P  Cabg  Reports no angina  Cont plan, meds rvwd    12 min call    Juan Dickson MD
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
## 2020-07-13 NOTE — TELEPHONE ENCOUNTER
On 7/13/2020, at 8:45, writer called patient at [phone] to confirm Telephone Visit. Writer unable to make contact with patient. Writer left detailed voice message for call back. [phone] left as call back number. Shalini Rivera, [facility]

## 2020-07-13 NOTE — NURSING NOTE
Chief Complaint   Patient presents with     Hospital F/U     Pt has telephone visit for hospital follow up      ESTUARDO Arriaga at 9:57 AM sign on 7/13/2020

## 2020-07-14 ENCOUNTER — MYC MEDICAL ADVICE (OUTPATIENT)
Dept: INTERNAL MEDICINE | Facility: CLINIC | Age: 60
End: 2020-07-14

## 2020-07-14 NOTE — PATIENT INSTRUCTIONS
[facility] Medication Refill Request Information:  * Please contact your pharmacy regarding ANY request for medication refills.  ** [facility] Prescription Fax = [phone]  * Please allow 3 business days for routine medication refills.  * Please allow 5 business days for controlled substance medication refills.     [facility] Test Result notification information:  *You will be notified with in 7-10 days of your appointment day regarding the results of your test.  If you are on MyChart you will be notified as soon as the provider has reviewed the results and signed off on them.    [facility]: [phone]  Please schedule a 3 month follow up appointment with Dr Perry.

## 2020-07-14 NOTE — TELEPHONE ENCOUNTER
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

## 2020-07-21 ENCOUNTER — OFFICE VISIT - HEALTHEAST (OUTPATIENT)
Dept: CARDIOLOGY | Facility: CLINIC | Age: 60
End: 2020-07-21

## 2020-07-21 ENCOUNTER — VIRTUAL VISIT (OUTPATIENT)
Dept: OTHER | Facility: OUTPATIENT CENTER | Age: 60
End: 2020-07-21
Payer: MEDICARE

## 2020-07-21 DIAGNOSIS — Z94.0 S/P KIDNEY TRANSPLANT: ICD-10-CM

## 2020-07-21 DIAGNOSIS — F33.1 MODERATE EPISODE OF RECURRENT MAJOR DEPRESSIVE DISORDER (H): Primary | ICD-10-CM

## 2020-07-21 DIAGNOSIS — I25.10 CORONARY ARTERY DISEASE INVOLVING NATIVE CORONARY ARTERY OF NATIVE HEART WITHOUT ANGINA PECTORIS: ICD-10-CM

## 2020-07-21 DIAGNOSIS — F91.8 CONDUCT DISORDER, UNDIFFERENTIATED TYPE: ICD-10-CM

## 2020-07-21 DIAGNOSIS — E78.00 PURE HYPERCHOLESTEROLEMIA: ICD-10-CM

## 2020-07-21 DIAGNOSIS — I10 ESSENTIAL HYPERTENSION: ICD-10-CM

## 2020-07-21 DIAGNOSIS — Z95.1 S/P CABG (CORONARY ARTERY BYPASS GRAFT): ICD-10-CM

## 2020-07-22 NOTE — PROGRESS NOTES
"[facility] -  Case Progress Note      Client Name: Jerad Ross  YOB: 1960  MRN:  [id number]  Treating Provider: Julita Wisdom LP  Type of Session: Individual  Present in Session: client  Number of Minutes:  45    Start time: 11:10  End time: 11:55    Attempted video through MyChart and doxy could not connect but client could not connect so we switched to telephone.    The following was reviewed with the patient.   \"We have found that certain health care needs can be provided without the need for a face to face visit.  This service lets us provide the care you need with a phone conversation. I will have full access to your [facility] medical record during this entire phone call.   I will be taking notes for your medical record. Since this is like an office visit, we will bill your insurance company for this service. There are potential benefits and risks of telephone visits (e.g. limits to patient confidentiality) that differ from in-person visits.?  Confidentiality still applies for telephone services, and nobody will record the visit.  It is important to be in a quiet, private space that is free of distractions (including cell phone or other devices) during the visit.??If during the course of the call I believe a telephone visit is not appropriate, you will not be charged for this service\"    Consent has been obtained for this service by care team member: Yes      Date of Service:7/21/20     Health Maintenance Summary - Mental Health Treatment Plan       Status Date      MENTAL HEALTH TX PLAN Next Due 10/21/2020      Done 11/21/2019 [facility] MENTAL HEALTH TX PLAN SCAN        Current Symptoms/Status:  Fatigue, low energy, anxiety and stress about recent heart bypass surgery and recovery, depression, low energy, thoughts and urges to act out sexually,  conflict and tension with his wife due to his past sexual behavior and the impact on the relationship,  financial " distress.    Progress Toward Treatment Goals:   Client reported progress with maintaining boundaries and reaching out to his wife.    Intervention: Modality and Description:  Provided support and feedback. Used CBT to process thoughts and urges, depression, and health issues.  Client shared his health and stamina are continuing to improve.  He is now back to work as well.    He shared that he has done some journaling regarding his interaction with his wife.  He shared that he had sent some text messages wanting inviting her to go to coffee.  He shared with those text messages he did not get a response from her.  When he texted her about some results from his heart surgery she did have a positive response to that.  We talked about a recent exchange and how he might talk to her about it.  Discussed that he also needs to pay attention to the information she is giving by her behavior.  We talked about him continuing to reach out as well.      response to Intervention:  Client reported gaining insight and validation.    Assignment:  Contact his wife to see if she will get together.    Interactive Complexity:  There are four specific communication difficulties that complicate the work of the primary psychiatric procedure.  Interactive complexity (+91999) may be reported when at least one of these difficulties is present.    Communication difficulties present during current the psychiatric procedure include:  1. None.    Diagnosis:  Encounter Diagnoses   Name Primary?     Moderate episode of recurrent major depressive disorder (H) Yes     Other Specified Disruptive, Impulse-Control, and Conduct Disorder (hypersexuality)          Plan / Need for Future Services:  Return for therapy in 2 weeks.      Julita Wisdom Psyd,  LP

## 2020-07-24 ENCOUNTER — AMBULATORY - HEALTHEAST (OUTPATIENT)
Dept: CARDIAC REHAB | Facility: CLINIC | Age: 60
End: 2020-07-24

## 2020-07-24 DIAGNOSIS — Z95.1 S/P CABG (CORONARY ARTERY BYPASS GRAFT): ICD-10-CM

## 2020-07-27 ENCOUNTER — AMBULATORY - HEALTHEAST (OUTPATIENT)
Dept: CARDIAC REHAB | Facility: CLINIC | Age: 60
End: 2020-07-27

## 2020-07-27 DIAGNOSIS — Z95.1 S/P CABG (CORONARY ARTERY BYPASS GRAFT): ICD-10-CM

## 2020-07-31 ENCOUNTER — AMBULATORY - HEALTHEAST (OUTPATIENT)
Dept: CARDIAC REHAB | Facility: CLINIC | Age: 60
End: 2020-07-31

## 2020-07-31 DIAGNOSIS — Z95.1 S/P CABG (CORONARY ARTERY BYPASS GRAFT): ICD-10-CM

## 2020-08-03 ENCOUNTER — COMMUNICATION - HEALTHEAST (OUTPATIENT)
Dept: GERIATRICS | Facility: CLINIC | Age: 60
End: 2020-08-03

## 2020-08-03 ENCOUNTER — AMBULATORY - HEALTHEAST (OUTPATIENT)
Dept: CARDIAC REHAB | Facility: CLINIC | Age: 60
End: 2020-08-03

## 2020-08-03 ENCOUNTER — COMMUNICATION - HEALTHEAST (OUTPATIENT)
Dept: CARDIOLOGY | Facility: CLINIC | Age: 60
End: 2020-08-03

## 2020-08-03 DIAGNOSIS — Z95.1 S/P CABG (CORONARY ARTERY BYPASS GRAFT): ICD-10-CM

## 2020-08-07 ENCOUNTER — AMBULATORY - HEALTHEAST (OUTPATIENT)
Dept: CARDIAC REHAB | Facility: CLINIC | Age: 60
End: 2020-08-07

## 2020-08-07 DIAGNOSIS — Z95.1 S/P CABG (CORONARY ARTERY BYPASS GRAFT): ICD-10-CM

## 2020-08-10 ENCOUNTER — VIRTUAL VISIT (OUTPATIENT)
Dept: OTHER | Facility: OUTPATIENT CENTER | Age: 60
End: 2020-08-10
Payer: MEDICARE

## 2020-08-10 ENCOUNTER — AMBULATORY - HEALTHEAST (OUTPATIENT)
Dept: CARDIAC REHAB | Facility: CLINIC | Age: 60
End: 2020-08-10

## 2020-08-10 DIAGNOSIS — Z95.1 S/P CABG (CORONARY ARTERY BYPASS GRAFT): ICD-10-CM

## 2020-08-10 DIAGNOSIS — F33.1 MODERATE EPISODE OF RECURRENT MAJOR DEPRESSIVE DISORDER (H): Primary | ICD-10-CM

## 2020-08-10 DIAGNOSIS — F91.8 CONDUCT DISORDER, UNDIFFERENTIATED TYPE: ICD-10-CM

## 2020-08-11 NOTE — PROGRESS NOTES
[facility] -  Case Progress Note      Client Name: Jerad Ross  YOB: 1960  MRN:  [id number]  Treating Provider: Julita Wisdom LP  Type of Session: Individual  Present in Session: client  Number of Minutes: 55    Start time:1:30 pm  End time: 2:25 pm    Telemedicine Visit: The patient's condition can be safely assessed and treated via synchronous audio and visual telemedicine encounter.      Reason for Telemedicine Visit: Covid-19    Originating Site (Patient Location): Patient's home    Distant Site (Provider Location): Provider Remote Setting    Consent:  The patient/guardian has verbally consented to: the potential risks and benefits of telemedicine (video visit) versus in person care; bill my insurance or make self-payment for services provided; and responsibility for payment of non-covered services.     Mode of Communication:  Video Conference via [employer]    As the provider I attest to compliance with applicable laws and regulations related to telemedicine.        Date of Service:8/10/20     Health Maintenance Summary - Mental Health Treatment Plan       Status Date      MENTAL HEALTH TX PLAN Next Due 10/21/2020      Done 11/21/2019 [facility] MENTAL HEALTH TX PLAN SCAN        Current Symptoms/Status:  Fatigue, low energy, anxiety and stress about recent heart bypass surgery and recovery, depression, low energy, thoughts and urges to act out sexually, boundary violations, conflict and tension with his wife due to his past sexual behavior and the impact on the relationship,  financial distress.    Progress Toward Treatment Goals:   Client reported progress identifying what led to boundary violation.    Intervention: Modality and Description:  Provided support and feedback. Used CBT to process thoughts and urges, depression, and health issues.  Client shared his health and energy is improving.  He shared he has been at heart rehabilitation and they want him to walk a mile a  day.  He shared that that feels quite challenging to him but he did get help for someone to Yarely him I want walk.  He shared with his joint replacements and other issues this could be quite difficult.  He shared 1 of the difficulties he has.  Is initiating getting out for a walk.  We discussed doing it at a certain time of day so that it is part of his daily habit.  He shared he decided not to do journaling or talk to his wife about previous discussed issue.  He shared that he realized that he has the information he needs and does not think that talking about it further will be helpful.  He shared that he has contacted her several times to see if she wants to get together and she does not respond.  Reviewed risk factors and need to maybe have things to fill out more of his time.    response to Intervention:  Client reported gaining insight and validation.    Assignment:  Contact his wife to see if she will get together.    Interactive Complexity:  There are four specific communication difficulties that complicate the work of the primary psychiatric procedure.  Interactive complexity (+38225) may be reported when at least one of these difficulties is present.    Communication difficulties present during current the psychiatric procedure include:  1. None.    Diagnosis:  Encounter Diagnoses   Name Primary?     Moderate episode of recurrent major depressive disorder (H) Yes     Other Specified Disruptive, Impulse-Control, and Conduct Disorder (hypersexuality)          Plan / Need for Future Services:  Return for therapy in 2 weeks.      uJlita Wisdom Psyd,  LP

## 2020-08-12 ENCOUNTER — AMBULATORY - HEALTHEAST (OUTPATIENT)
Dept: CARDIAC REHAB | Facility: CLINIC | Age: 60
End: 2020-08-12

## 2020-08-12 DIAGNOSIS — I21.4 NSTEMI (NON-ST ELEVATED MYOCARDIAL INFARCTION) (H): ICD-10-CM

## 2020-08-12 DIAGNOSIS — Z95.1 S/P CABG (CORONARY ARTERY BYPASS GRAFT): ICD-10-CM

## 2020-08-12 DIAGNOSIS — I25.10 CORONARY ARTERY DISEASE INVOLVING NATIVE CORONARY ARTERY OF NATIVE HEART WITHOUT ANGINA PECTORIS: ICD-10-CM

## 2020-08-12 DIAGNOSIS — R07.9 ACUTE CHEST PAIN: ICD-10-CM

## 2020-08-14 ENCOUNTER — AMBULATORY - HEALTHEAST (OUTPATIENT)
Dept: CARDIAC REHAB | Facility: CLINIC | Age: 60
End: 2020-08-14

## 2020-08-14 DIAGNOSIS — Z95.1 S/P CABG (CORONARY ARTERY BYPASS GRAFT): ICD-10-CM

## 2020-08-17 RX ORDER — ISOSORBIDE MONONITRATE 30 MG/1
15 TABLET, EXTENDED RELEASE ORAL DAILY
Qty: 45 TABLET | Refills: 3 | Status: SHIPPED | OUTPATIENT
Start: 2020-08-17 | End: 2021-03-12

## 2020-08-17 RX ORDER — CLOPIDOGREL BISULFATE 75 MG/1
75 TABLET ORAL DAILY
Qty: 90 TABLET | Refills: 3 | Status: SHIPPED | OUTPATIENT
Start: 2020-08-17 | End: 2021-03-12

## 2020-08-17 RX ORDER — AMLODIPINE BESYLATE 5 MG/1
TABLET ORAL
Qty: 90 TABLET | Refills: 0 | OUTPATIENT
Start: 2020-08-17

## 2020-08-17 NOTE — TELEPHONE ENCOUNTER
Last Clinic Visit: 7/13/2020  [facility]    *BP elevated above parameters x1 1/22/2020-- pt had a procedure that day.  Other recent BP's WNL.  BP Readings from Last 3 Encounters:   01/22/20 (!) 146/86   01/09/20 131/88   01/06/20 130/89

## 2020-08-18 ENCOUNTER — OFFICE VISIT (OUTPATIENT)
Dept: OPHTHALMOLOGY | Facility: CLINIC | Age: 60
End: 2020-08-18
Attending: OPHTHALMOLOGY
Payer: COMMERCIAL

## 2020-08-18 DIAGNOSIS — Z96.1 PSEUDOPHAKIA OF BOTH EYES: ICD-10-CM

## 2020-08-18 DIAGNOSIS — R07.9 ACUTE CHEST PAIN: ICD-10-CM

## 2020-08-18 DIAGNOSIS — H40.053 BORDERLINE GLAUCOMA WITH OCULAR HYPERTENSION, BILATERAL: Primary | ICD-10-CM

## 2020-08-18 DIAGNOSIS — H47.011 ACUTE ISCHEMIC OPTIC NEUROPATHY OF RIGHT EYE: ICD-10-CM

## 2020-08-18 PROCEDURE — G0463 HOSPITAL OUTPT CLINIC VISIT: HCPCS | Mod: ZF

## 2020-08-18 PROCEDURE — 76514 ECHO EXAM OF EYE THICKNESS: CPT | Mod: ZF | Performed by: STUDENT IN AN ORGANIZED HEALTH CARE EDUCATION/TRAINING PROGRAM

## 2020-08-18 RX ORDER — LATANOPROST 50 UG/ML
1 SOLUTION/ DROPS OPHTHALMIC AT BEDTIME
Qty: 2.5 ML | Refills: 4 | Status: SHIPPED | OUTPATIENT
Start: 2020-08-18 | End: 2021-12-07

## 2020-08-18 ASSESSMENT — PACHYMETRY
OS_CT(UM): 622
OD_CT(UM): 624

## 2020-08-18 ASSESSMENT — TONOMETRY
OD_IOP_MMHG: 20
IOP_METHOD: APPLANATION
OS_IOP_MMHG: 17
IOP_METHOD: APPLANATION
OS_IOP_MMHG: 17
OD_IOP_MMHG: 20

## 2020-08-18 ASSESSMENT — VISUAL ACUITY
OS_CC: 20/25 SLOW
OD_PH_CC: 20/200 ECC FIX
OS_CC+: -1
OD_CC: 20/300
CORRECTION_TYPE: GLASSES
METHOD: SNELLEN - LINEAR

## 2020-08-18 ASSESSMENT — REFRACTION_WEARINGRX
OS_CYLINDER: +2.25
OS_SPHERE: -0.50
OS_ADD: +2.50
SPECS_TYPE: TRIFOCAL
OD_SPHERE: BALANCE
OS_AXIS: 170

## 2020-08-18 ASSESSMENT — EXTERNAL EXAM - RIGHT EYE: OD_EXAM: NORMAL

## 2020-08-18 ASSESSMENT — CONF VISUAL FIELD
OD_NORMAL: 1
OS_NORMAL: 1

## 2020-08-18 ASSESSMENT — SLIT LAMP EXAM - LIDS
COMMENTS: NORMAL
COMMENTS: NORMAL

## 2020-08-18 ASSESSMENT — CUP TO DISC RATIO: OS_RATIO: 0.65

## 2020-08-18 ASSESSMENT — EXTERNAL EXAM - LEFT EYE: OS_EXAM: NORMAL

## 2020-08-18 NOTE — PROGRESS NOTES
CC:   OHTN f/u      HPI:  60M with a history of OHTN here for 6 week follow up s/p SLT left eye.  He continues to take latanoprost at bedtime both eyes.  Doing well.  He forgot his nighttime latanoprost last night so he took it this morning.  Vision is stable.  He did note a mild ache to his left eye for several days after SLT which has since resolved.  He felt light sensitive over the weekend but had forgotten his sunglasses.  The symptom did not persist.  Otherwise no pain/discharge.        Review of systems for the eyes was negative other than the pertinent positives/negatives listed in the HPI.      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 Borderline glaucoma with ocular hypertension, bilateral    2. Pseudophakia of both eyes    3. Acute ischemic optic neuropathy of right eye      Glaucoma suspect based upon age, IOP, C/D asymmetry.  Right eye poor vision likely due to AION overlay and remains stable.    - IOP today:  20/17  - Tmax:  26/22  - IOP target: Normotension  - Pachy:  624/622  - C/D:  0.2/0.65 pale  - Gonio:  Needs  - Trauma history:  Negative  - VF:  LVC right eye generalized depression (stable), 24-2 left eye progression from 2019 - decision made to do SLT  - RNFL: Right eye diffuse thinning; left eye diffuse thinning (stable)  - FH:  Negative    IOP today is 20/17 s/p SLT left eye on 7/7/2020; from 22 six weeks ago.    - Continue latanoprost at bedtime each eye   - RTC 6 months IOP, LVC right eye, HVF 24-2 left eye, DFE    Patient disposition:   Return for RTC 6 months IOP, LVC right eye, HVF 24-2 left eye, DFE.       Arron Vargas, PGY3  Ophthalmology  Resident    Attending Physician Attestation:  Complete documentation of historical and exam elements from today's encounter can be found in the full encounter summary report (not reduplicated in this progress note).  I personally obtained the chief complaint(s) and history of present illness.  I confirmed and edited as necessary the review of systems, past medical/surgical history, family history, social history, and examination findings as documented by others; and I examined the patient myself.  I personally reviewed the relevant tests, images, and reports as documented above.  I formulated and edited as necessary the assessment and plan and discussed the findings and management plan with the patient and family. . - Wood Miller MD

## 2020-08-18 NOTE — PATIENT INSTRUCTIONS
Continue latanoprost at bedtime in both eyes (teal/green cap)  Use artificial tears as needed for eye irritation

## 2020-08-18 NOTE — NURSING NOTE
Chief Complaint(s) and History of Present Illness(es)     Glaucoma Follow-Up     In both eyes.  Associated symptoms include eye pain.  Negative for dryness, redness and tearing.  Pain was noted as 2/10.              Comments     6 week f/u for Borderline glaucoma with ocular hypertension, BE, IOP check, s/p SLT LE (07/07/20). Pt states no changes in the vision BE since getting new glasses. Pt notes some intermittent eye pain (ache), especially after being out in the sun all day without sunglasses over the weekend and after the SLT last visit. Pt denies any other changes.     Ocular meds:  Latanoprost at bedtime BE (pt had one drop this am, he forgot last night)    Belen Farah, COMT 12:42 PM August 18, 2020

## 2020-08-21 ENCOUNTER — AMBULATORY - HEALTHEAST (OUTPATIENT)
Dept: CARDIAC REHAB | Facility: CLINIC | Age: 60
End: 2020-08-21

## 2020-08-21 DIAGNOSIS — Z95.1 S/P CABG (CORONARY ARTERY BYPASS GRAFT): ICD-10-CM

## 2020-08-22 RX ORDER — AMLODIPINE BESYLATE 5 MG/1
TABLET ORAL
Qty: 90 TABLET | Refills: 0 | OUTPATIENT
Start: 2020-08-22

## 2020-08-24 ENCOUNTER — AMBULATORY - HEALTHEAST (OUTPATIENT)
Dept: CARDIAC REHAB | Facility: CLINIC | Age: 60
End: 2020-08-24

## 2020-08-24 DIAGNOSIS — Z95.1 S/P CABG (CORONARY ARTERY BYPASS GRAFT): ICD-10-CM

## 2020-08-28 ENCOUNTER — AMBULATORY - HEALTHEAST (OUTPATIENT)
Dept: CARDIAC REHAB | Facility: CLINIC | Age: 60
End: 2020-08-28

## 2020-08-28 DIAGNOSIS — Z95.1 S/P CABG (CORONARY ARTERY BYPASS GRAFT): ICD-10-CM

## 2020-08-31 ENCOUNTER — AMBULATORY - HEALTHEAST (OUTPATIENT)
Dept: CARDIAC REHAB | Facility: CLINIC | Age: 60
End: 2020-08-31

## 2020-08-31 DIAGNOSIS — Z95.1 S/P CABG (CORONARY ARTERY BYPASS GRAFT): ICD-10-CM

## 2020-09-04 ENCOUNTER — AMBULATORY - HEALTHEAST (OUTPATIENT)
Dept: CARDIAC REHAB | Facility: CLINIC | Age: 60
End: 2020-09-04

## 2020-09-04 DIAGNOSIS — Z95.1 S/P CABG (CORONARY ARTERY BYPASS GRAFT): ICD-10-CM

## 2020-09-08 NOTE — PROGRESS NOTES
Care Coordinator Progress Note    Admission Date/Time:  11/4/2018  Attending MD:  Torsten Villanueva MD    Data  Chart reviewed, discussed with interdisciplinary team.   Patient was admitted for:    Infection of lumbar spine (H)  Acute midline low back pain without sciatica  Septic bursitis  Impaired mobility  Immunosuppressed status (H).    Concerns with insurance coverage for discharge needs: None.  Current Living Situation: Patient lives with spouse.  Support System: Supportive and Involved  Services Involved: none  Transportation at Discharge: Family or friend will provide  Transportation to Medical Appointments: - family or friend will provide  Barriers to Discharge: medical needs    Coordination of Care  D: Chart reviewed and plan of care discussed with Medical Team.   I/A: Patient is presently on IV ABX; ID to determine if patient will dc with oral or continued IV ABX. Neurosurgery has no surgical interventions at this time. Patient has PT and OT evaluations ordered. Met with patient at bedside to provide education regarding discharge home with home infusion and home care vs outpatient infusion and outpatient therapy vs patient presented that he is open to TCU placement but prefers home, if possible. SW updated.  P: Care Coordinator will continue to follow      Referrals: Discussed with patient options for Home Care, Home Infusion and Outpatient Infusion Services; none specifically selected.       Plan  Anticipated Discharge Date:  TBD  Anticipated Discharge Plan:  TBD    Leora Pa RN, BSN, PHN  Medicine Care Coordinator  [facility] and [facility]  Desk Phone: [phone]  Pager: [phone]    To contact Weekend RNCC, dial * * [phone] and enter job code [phone] at prompt.   This pager can not be contacted by text page or outside line.        Leonel Glez  ORTHOPAEDIC SURGERY  [address], [facility] [address]  [place] [address]  Phone: [phone]  Fax: [phone]  Follow Up Time:

## 2020-09-11 ENCOUNTER — AMBULATORY - HEALTHEAST (OUTPATIENT)
Dept: CARDIAC REHAB | Facility: CLINIC | Age: 60
End: 2020-09-11

## 2020-09-11 DIAGNOSIS — Z95.1 S/P CABG (CORONARY ARTERY BYPASS GRAFT): ICD-10-CM

## 2020-09-14 ENCOUNTER — VIRTUAL VISIT (OUTPATIENT)
Dept: OTHER | Facility: OUTPATIENT CENTER | Age: 60
End: 2020-09-14
Payer: MEDICARE

## 2020-09-14 ENCOUNTER — AMBULATORY - HEALTHEAST (OUTPATIENT)
Dept: CARDIAC REHAB | Facility: CLINIC | Age: 60
End: 2020-09-14

## 2020-09-14 DIAGNOSIS — Z95.1 S/P CABG (CORONARY ARTERY BYPASS GRAFT): ICD-10-CM

## 2020-09-14 DIAGNOSIS — F33.1 MODERATE EPISODE OF RECURRENT MAJOR DEPRESSIVE DISORDER (H): Primary | ICD-10-CM

## 2020-09-14 DIAGNOSIS — F91.8 CONDUCT DISORDER, UNDIFFERENTIATED TYPE: ICD-10-CM

## 2020-09-15 NOTE — PROGRESS NOTES
[facility] -  Case Progress Note      Client Name: Jerad Ross  YOB: 1960  MRN:  [id number]  Treating Provider: Julita Wisdom LP  Type of Session: Individual  Present in Session: client  Number of Minutes: 55    Start time:1:30 pm  End time: 2:25 pm    Telemedicine Visit: The patient's condition can be safely assessed and treated via synchronous audio and visual telemedicine encounter.      Reason for Telemedicine Visit: Covid-19    Originating Site (Patient Location): Patient's home    Distant Site (Provider Location): Provider Remote Setting    Consent:  The patient/guardian has verbally consented to: the potential risks and benefits of telemedicine (video visit) versus in person care; bill my insurance or make self-payment for services provided; and responsibility for payment of non-covered services.     Mode of Communication:  Video Conference via [employer], doxy    As the provider I attest to compliance with applicable laws and regulations related to telemedicine.        Date of Service:9/14/20     Health Maintenance Summary - Mental Health Treatment Plan       Status Date      MENTAL HEALTH TX PLAN Next Due 10/21/2020      Done 11/21/2019 HIM MENTAL HEALTH TX PLAN SCAN        Current Symptoms/Status:  Thoughts and urges to act out sexually, boundary violation, distress about relationship,, low energy, anxiety and stress about recent heart bypass surgery and recovery, depression, low energy,onflict and tension with his wife due to his past sexual behavior and the impact on the relationship,  financial distress.    Progress Toward Treatment Goals:   Client reported progress identifying what led to boundary violation.    Intervention: Modality and Description:  Provided support and feedback. Used CBT to process thoughts and urges, depression, and health issues.  Client shared his energy has been improving.  He shared that he had a boundary violation within the last  couple of weeks.  He talked about the specific incident and what happened.  He shared that he is really upset with himself and frustrated about this boundary violation.  We discussed what his current boundaries are and additional things that may be helpful to him.  He shared that he is going to go back to using a flip phone because it is phone that increases risk for him.  He also shared wanting to address his relationship again.  We talked about what was going on and if he had followed up with talking with his wife at all.  He discussed trying to talk to their  and working with the  to address the relationship.  Validated this is a good idea.  Client shared his belief system about not wanting to get a divorce.  We discussed that he has to continue working on communicating with her.    response to Intervention:  Client reported gaining insight and validation.    Assignment:  Contact his wife to see if she will get together.    Interactive Complexity:  There are four specific communication difficulties that complicate the work of the primary psychiatric procedure.  Interactive complexity (+61234) may be reported when at least one of these difficulties is present.    Communication difficulties present during current the psychiatric procedure include:  1. None.    Diagnosis:  Encounter Diagnoses   Name Primary?     Moderate episode of recurrent major depressive disorder (H) Yes     Other Specified Disruptive, Impulse-Control, and Conduct Disorder (hypersexuality)          Plan / Need for Future Services:  Return for therapy in 2 weeks.      Julita Wisdom Psyd,  LP

## 2020-09-16 DIAGNOSIS — F33.41 RECURRENT MAJOR DEPRESSIVE DISORDER, IN PARTIAL REMISSION (H): ICD-10-CM

## 2020-09-18 ENCOUNTER — AMBULATORY - HEALTHEAST (OUTPATIENT)
Dept: CARDIAC REHAB | Facility: CLINIC | Age: 60
End: 2020-09-18

## 2020-09-18 DIAGNOSIS — Z95.1 S/P CABG (CORONARY ARTERY BYPASS GRAFT): ICD-10-CM

## 2020-09-18 RX ORDER — FLUOXETINE 40 MG/1
CAPSULE ORAL
Qty: 90 CAPSULE | Refills: 0 | OUTPATIENT
Start: 2020-09-18

## 2020-09-18 RX ORDER — FLUOXETINE 10 MG/1
CAPSULE ORAL
Qty: 90 CAPSULE | Refills: 0 | OUTPATIENT
Start: 2020-09-18

## 2020-09-18 NOTE — TELEPHONE ENCOUNTER
Refill denied  Too soon  SCRIPT SENT 7/13/20 #90(3 MONTHS)  APPT 10/8/20    
Consent was obtained and risks were reviewed.
Acne Type: Comedonal Lesions
Render Post-Care Instructions In Note?: no
Detail Level: Zone
Post-Care Instructions: I reviewed with the patient  post-care instructions.
Prep Text (Optional): Prior to removal the treatment areas were prepped in the usual fashion.
Extraction Method: comedo extractor

## 2020-09-21 ENCOUNTER — TELEPHONE (OUTPATIENT)
Dept: TRANSPLANT | Facility: CLINIC | Age: 60
End: 2020-09-21

## 2020-09-21 ENCOUNTER — AMBULATORY - HEALTHEAST (OUTPATIENT)
Dept: CARDIAC REHAB | Facility: CLINIC | Age: 60
End: 2020-09-21

## 2020-09-21 DIAGNOSIS — Z79.899 ENCOUNTER FOR LONG-TERM CURRENT USE OF MEDICATION: ICD-10-CM

## 2020-09-21 DIAGNOSIS — Z94.0 KIDNEY REPLACED BY TRANSPLANT: Primary | ICD-10-CM

## 2020-09-21 DIAGNOSIS — Z48.298 AFTERCARE FOLLOWING ORGAN TRANSPLANT: ICD-10-CM

## 2020-09-21 DIAGNOSIS — Z95.1 S/P CABG (CORONARY ARTERY BYPASS GRAFT): ICD-10-CM

## 2020-09-21 NOTE — TELEPHONE ENCOUNTER
Patient called needs updated lab order in [facility] patient will be get labs done on 10/6/20 at [facility].

## 2020-09-28 ENCOUNTER — AMBULATORY - HEALTHEAST (OUTPATIENT)
Dept: CARDIAC REHAB | Facility: CLINIC | Age: 60
End: 2020-09-28

## 2020-09-28 ENCOUNTER — VIRTUAL VISIT (OUTPATIENT)
Dept: OTHER | Facility: OUTPATIENT CENTER | Age: 60
End: 2020-09-28

## 2020-09-28 DIAGNOSIS — F33.1 MODERATE EPISODE OF RECURRENT MAJOR DEPRESSIVE DISORDER (H): Primary | ICD-10-CM

## 2020-09-28 DIAGNOSIS — F91.8 CONDUCT DISORDER, UNDIFFERENTIATED TYPE: ICD-10-CM

## 2020-09-28 DIAGNOSIS — Z95.1 S/P CABG (CORONARY ARTERY BYPASS GRAFT): ICD-10-CM

## 2020-09-28 NOTE — PROGRESS NOTES
[facility] -  Case Progress Note      Client Name: Jerad Ross  YOB: 1960  MRN:  [id number]  Treating Provider: Julita Wisdom LP  Type of Session: Individual  Present in Session: client  Number of Minutes: 40    Start time:1:30 pm  End time: 2:10 pm    Telemedicine Visit: The patient's condition can be safely assessed and treated via synchronous audio and visual telemedicine encounter.      Reason for Telemedicine Visit: Covid-19    Originating Site (Patient Location): Patient's home    Distant Site (Provider Location): Provider Remote Setting    Consent:  The patient/guardian has verbally consented to: the potential risks and benefits of telemedicine (video visit) versus in person care; bill my insurance or make self-payment for services provided; and responsibility for payment of non-covered services.     Mode of Communication:  Video Conference via [employer], doxy    As the provider I attest to compliance with applicable laws and regulations related to telemedicine.        Date of Service:9/28/20     Health Maintenance Summary - Mental Health Treatment Plan       Status Date      MENTAL HEALTH TX PLAN Next Due 10/21/2020      Done 11/21/2019 HIM MENTAL HEALTH TX PLAN SCAN        Current Symptoms/Status:  Thoughts and urges to act out sexually, boundary violation with fantasy, distress about relationship,, low energy, anxiety and stress about recent heart bypass surgery and recovery, depression, low energy,onflict and tension with his wife due to his past sexual behavior and the impact on the relationship,  financial distress.    Progress Toward Treatment Goals:   Client reported progress no boundary violations.    Intervention: Modality and Description:  Provided support and feedback. Used CBT to process thoughts and urges, depression, and health issues.  Client shared he has maintained boundaries regarding looking at sexual images. He shared he did have some sexual  fantasies that he views as a boundary violation. Explored his beliefs around sexual fantasy and why this is a boundary. Client shared he intentionally went to sexual fantasy because he was feeling anxious. This urge occurs when he is bed. Explored using imagery of other things to relax him to see if that helps. Also processed his decision to not try to pursue relationship issues with wife right now. He wants to wait until he has had a period of abstinence. He has a meeting set up with his  to discuss marriage relationship as well.    response to Intervention:  Client reported gaining insight and validation.    Assignment:  Try imagery at bedtime.    Interactive Complexity:  There are four specific communication difficulties that complicate the work of the primary psychiatric procedure.  Interactive complexity (+66539) may be reported when at least one of these difficulties is present.    Communication difficulties present during current the psychiatric procedure include:  1. None.    Diagnosis:  Encounter Diagnoses   Name Primary?     Moderate episode of recurrent major depressive disorder (H) Yes     Other Specified Disruptive, Impulse-Control, and Conduct Disorder (hypersexuality)          Plan / Need for Future Services:  Return for therapy in 2 weeks.      Julita Wisdom Psyd,  LP

## 2020-10-02 ENCOUNTER — AMBULATORY - HEALTHEAST (OUTPATIENT)
Dept: CARDIAC REHAB | Facility: CLINIC | Age: 60
End: 2020-10-02

## 2020-10-02 DIAGNOSIS — Z95.1 S/P CABG (CORONARY ARTERY BYPASS GRAFT): ICD-10-CM

## 2020-10-05 ENCOUNTER — AMBULATORY - HEALTHEAST (OUTPATIENT)
Dept: CARDIAC REHAB | Facility: CLINIC | Age: 60
End: 2020-10-05

## 2020-10-05 DIAGNOSIS — Z95.1 S/P CABG (CORONARY ARTERY BYPASS GRAFT): ICD-10-CM

## 2020-10-06 ENCOUNTER — OFFICE VISIT (OUTPATIENT)
Dept: NEPHROLOGY | Facility: CLINIC | Age: 60
End: 2020-10-06
Attending: INTERNAL MEDICINE
Payer: COMMERCIAL

## 2020-10-06 VITALS
BODY MASS INDEX: 26.17 KG/M2 | TEMPERATURE: 98.3 F | HEART RATE: 83 BPM | OXYGEN SATURATION: 97 % | SYSTOLIC BLOOD PRESSURE: 132 MMHG | DIASTOLIC BLOOD PRESSURE: 86 MMHG | WEIGHT: 167.1 LBS

## 2020-10-06 DIAGNOSIS — D84.9 IMMUNOSUPPRESSION (H): ICD-10-CM

## 2020-10-06 DIAGNOSIS — Z23 ENCOUNTER FOR IMMUNIZATION: ICD-10-CM

## 2020-10-06 DIAGNOSIS — Z94.0 HTN, KIDNEY TRANSPLANT RELATED: ICD-10-CM

## 2020-10-06 DIAGNOSIS — I15.1 HTN, KIDNEY TRANSPLANT RELATED: ICD-10-CM

## 2020-10-06 DIAGNOSIS — Z48.298 AFTERCARE FOLLOWING ORGAN TRANSPLANT: ICD-10-CM

## 2020-10-06 DIAGNOSIS — I25.810 CORONARY ARTERY DISEASE INVOLVING OTHER CORONARY ARTERY BYPASS GRAFT WITHOUT ANGINA PECTORIS: ICD-10-CM

## 2020-10-06 DIAGNOSIS — Z94.0 KIDNEY REPLACED BY TRANSPLANT: Primary | ICD-10-CM

## 2020-10-06 PROCEDURE — 90686 IIV4 VACC NO PRSV 0.5 ML IM: CPT | Performed by: INTERNAL MEDICINE

## 2020-10-06 PROCEDURE — G0008 ADMIN INFLUENZA VIRUS VAC: HCPCS

## 2020-10-06 PROCEDURE — 99214 OFFICE O/P EST MOD 30 MIN: CPT | Mod: GC

## 2020-10-06 PROCEDURE — G0463 HOSPITAL OUTPT CLINIC VISIT: HCPCS | Mod: 25

## 2020-10-06 PROCEDURE — 250N000011 HC RX IP 250 OP 636: Performed by: INTERNAL MEDICINE

## 2020-10-06 PROCEDURE — G0008 ADMIN INFLUENZA VIRUS VAC: HCPCS | Performed by: INTERNAL MEDICINE

## 2020-10-06 RX ADMIN — INFLUENZA A VIRUS A/GUANGDONG-MAONAN/SWL1536/2019 CNIC-1909 (H1N1) ANTIGEN (FORMALDEHYDE INACTIVATED), INFLUENZA A VIRUS A/HONG KONG/2671/2019 (H3N2) ANTIGEN (FORMALDEHYDE INACTIVATED), INFLUENZA B VIRUS B/PHUKET/3073/2013 ANTIGEN (FORMALDEHYDE INACTIVATED), AND INFLUENZA B VIRUS B/WASHINGTON/02/2019 ANTIGEN (FORMALDEHYDE INACTIVATED) 0.5 ML: 15; 15; 15; 15 INJECTION, SUSPENSION INTRAMUSCULAR at 14:53

## 2020-10-06 ASSESSMENT — PAIN SCALES - GENERAL: PAINLEVEL: NO PAIN (0)

## 2020-10-06 NOTE — LETTER
10/6/2020       RE: Jerad Ross  [address]  [place] [address]     Dear Colleague,    Thank you for referring your patient, Jerad Ross, to the [facility] [place] [facility]. Please see a copy of my visit note below.    CHRONIC TRANSPLANT NEPHROLOGY VISIT      Assessment & Plan   # DDKT: Stable  - Baseline Cr ~ 0.8-1.1 (0.7 in June)   - Proteinuria: Minimal (0.2-0.5 grams)  - Date DSA Last Checked: Not Known   Latest DSA: Not checked recently due to time from transplant  - BK Viremia: Not checked recently  - Kidney Tx Biopsy: No  - Flu shot today.    # Immunosuppression:   - On Mycophenolate 750 mg bid and prednisone 5 mg daily. Cyclosporine was discontinued few years ago given his history of skin malignancy.   - Changes: No    # Hypertension: Controlled; Goal BP: < 130/80  - Changes: No, he is only on lopressor 12.5 mg at this point.  - His BP was uncontrolled and difficult to control for many years in the past.    # History of basal cell cancer:  - 2 episodes, most recent was in late 90's.  - He is off CNI for few years now.  - Follow up with dermatology.    # Chronic hepatitis B:  - On Entecavir, reportedly controlled, he follows with hepatology.    # Anemia in Chronic Renal Disease:   - Hgb 11.9 in June 2019, dropped to 6.6 following his CABG, s/p transfusion.   - Monitor for now.    # Mineral Bone Disorder:   - PTH level: Not checked recently   - Vitamin D; level: Not checked recently, but was normal last check  Continue Ca-Vit D supplement  - Calcium; level: Normal   - Phosphorus; level: Not checked recently, but was normal last check    # CAD s/p CABG in 6/2020:  - He is undergoing cardiac rehab, doing well overall.  - F/U with cardiology.         # Kidney Ultrasound: The native kidneys US neg no masses 2019  # Skin Cancer Risk:   - Discussed sun protection and recommend regular follow up with Dermatology.   - He has  an appointment with his dermatologist soon.    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  Tx: DDKT  Transplant: 10/6/1993 (Kidney), 4/18/1978 (Kidney)  Donor Type: Donation after Brain Death Donor Class: Standard Criteria Donor  Significant changes in immunosuppression: None  Significant transplant-related complications: None    Transplant Office Phone Number: [phone]    Assessment and plan was discussed with the patient and he voiced his understanding and agreement.    Return visit: No follow-ups on file.      Chief Complaint   Mr. Ross is a 60 year old here for routine follow up.    History of Present Illness   Mr. 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 His creatinine is stable in June 2020. He had CABG done in June 2020 and currently undergoing cardiac rehab, he states he feels well overall. He denies any dyspnea , nausea or vomiting.         Recent Hospitalizations:  [x] No [] Yes    New Medical Issues: [x] No [] Yes    Decreased energy: [x] No [] Yes    Chest pain or SOB with exertion:  [x] No [] Yes    Appetite change or weight change: [x] No [] Yes    Nausea, vomiting or diarrhea:  [x] No [] Yes    Fever, sweats or chills: [x] No [] Yes    Leg swelling: [x] No [] Yes      Home BP: Not checked    Review of Systems   A comprehensive review of systems was obtained and negative, except as noted in the HPI or PMH.    Problem List   Patient Active Problem List   Diagnosis     BCC (basal cell carcinoma), trunk     JULIANE (obstructive sleep apnea)     HTN, kidney transplant related     Coronary artery disease involving native coronary artery of native heart without angina pectoris     NSTEMI (non-ST elevated myocardial infarction) (H)     Depression     Diverticulosis     Hemorrhoids     Kidney replaced by transplant     Basal cell  carcinoma     Squamous cell carcinoma     Dyslipidemia     CRP elevated     Glaucoma     AION (acute ischemic optic neuropathy)     Paracentral scotoma     Hip pain     Inflamed seborrheic keratosis     Intertrigo     Chronic hepatitis B (H)     Immunosuppression (H)     Hypogonadism in male     GERD (gastroesophageal reflux disease)     Aftercare following organ transplant     Acute midline low back pain without sciatica     Septic bursitis     Impaired mobility     Infection of lumbar spine (H)       Social History     Allergies   Allergies   Allergen Reactions     Penicillins Shortness Of Breath and Hives     Keflex [Cephalexin Hcl] Other (See Comments)     Pt could not recall reaction     Tetracycline Other (See Comments)     Patient could not recall reaction     Sulfa Drugs Rash       Medications   Current Outpatient Medications   Medication Sig     acetaminophen (TYLENOL) 325 MG tablet Take 1-2 tablets by mouth every 6 hours as needed.     albuterol (PROAIR HFA) 108 (90 Base) MCG/ACT Inhaler Inhale 2 puffs into the lungs every 6 hours as needed for shortness of breath / dyspnea or wheezing (Patient not taking: Reported on 7/7/2020)     aspirin 81 MG tablet Take 81 mg by mouth daily      atorvastatin (LIPITOR) 20 MG tablet Take 1 tablet (20 mg) by mouth daily (Patient not taking: Reported on 7/7/2020)     BETA BLOCKER NOT PRESCRIBED, INTENTIONAL, Beta Blocker not prescribed intentionally due to Bradycardia < 50 bpm without beta blocker therapy     budesonide (PULMICORT FLEXHALER) 90 MCG/ACT inhaler Inhale 2 puffs into the lungs 2 times daily (Patient not taking: Reported on 7/7/2020)     calcium citrate-vitamin D (CITRACAL) 315-200 MG-UNIT TABS Take 1 tablet by mouth daily.     clopidogrel (PLAVIX) 75 MG tablet Take 1 tablet (75 mg) by mouth daily     entecavir (BARACLUDE) 0.5 MG tablet Take 1 tablet (0.5 mg) by mouth daily     FLUoxetine (PROZAC) 10 MG capsule Take 1 capsule (10 mg) by mouth daily With 40mg  daily for a total daily dose of 50mg     FLUoxetine (PROZAC) 40 MG capsule Take 1 capsule (40 mg) by mouth daily     fluticasone-salmeterol (ADVAIR) 250-50 MCG/DOSE inhaler Inhale 1 puff into the lungs every 12 hours (Patient not taking: Reported on 7/7/2020)     isosorbide mononitrate (IMDUR) 30 MG 24 hr tablet Take 0.5 tablets (15 mg) by mouth daily     latanoprost (XALATAN) 0.005 % ophthalmic solution Place 1 drop into both eyes At Bedtime     metoprolol tartrate (LOPRESSOR) 25 MG tablet TAKE 1/2 TABLET BY MOUTH TWICE DAILY     Multiple Vitamins-Iron (ONE DAILY MULTIVITAMIN/IRON) TABS Take 1 tablet by mouth daily     mycophenolate (GENERIC EQUIVALENT) 250 MG capsule TAKE 3 CAPSULES BY MOUTH TWICE DAILY     nitroGLYcerin (NITROSTAT) 0.4 MG sublingual tablet Place 0.4 mg under the tongue     Omega-3 Fatty Acids (OMEGA 3 PO) Take 1 capsule by mouth daily Dose unknown     pantoprazole (PROTONIX) 40 MG EC tablet Take 1 tablet (40 mg) by mouth daily     polyethylene glycol (MIRALAX) 17 g packet Take 17 g by mouth     predniSONE (DELTASONE) 5 MG tablet Take 1 tablet (5 mg) by mouth daily     rosuvastatin (CRESTOR) 20 MG tablet TAKE 1 TABLET BY MOUTH EVERY DAY     Current Facility-Administered Medications   Medication     lidocaine 1% with EPINEPHrine 1:100,000 injection 3 mL     lidocaine 1% with EPINEPHrine 1:100,000 injection 3 mL     There are no discontinued medications.    Physical Exam   Vital Signs:   /86  Physical Exam  Constitutional:       Appearance: Normal appearance.   HENT:      Head: Normocephalic and atraumatic.      Mouth/Throat:      Mouth: Mucous membranes are moist.   Neck:      Musculoskeletal: Neck supple.   Cardiovascular:      Rate and Rhythm: Normal rate and regular rhythm.      Heart sounds: Normal heart sounds.   Pulmonary:      Effort: Pulmonary effort is normal.      Breath sounds: Normal breath sounds.   Skin:     General: Skin is warm.   Neurological:      Mental Status: He is alert  and oriented to person, place, and time.   Psychiatric:         Mood and Affect: Mood normal.             Data     Renal Latest Ref Rng & Units 12/23/2019 9/20/2019 4/2/2019   Na 133 - 144 mmol/L 138 139 134   K 3.4 - 5.3 mmol/L 3.3(L) 3.4 3.8   Cl 94 - 109 mmol/L 105 106 102   CO2 20 - 32 mmol/L 28 26 26   BUN 7 - 30 mg/dL 13 11 12   Cr 0.66 - 1.25 mg/dL 0.82 0.75 0.74   Glucose 70 - 99 mg/dL 104(H) 97 80   Ca  8.5 - 10.1 mg/dL 9.0 9.0 9.6   Mg 1.6 - 2.3 mg/dL - - -     Bone Health Latest Ref Rng & Units 11/5/2018 11/7/2017 10/3/2014   Phos 2.5 - 4.5 mg/dL 2.9 - 1.9(L)   PTHi 12 - 72 pg/mL - - -   Vit D Def 20 - 75 ug/L - 28 -     Heme Latest Ref Rng & Units 12/23/2019 9/27/2019 9/20/2019   WBC 4.0 - 11.0 10e9/L 8.8 11.2(H) 11.9(H)   Hgb 13.3 - 17.7 g/dL 15.4 - 13.9   Plt 150 - 450 10e9/L 307 - 345   ABSOLUTE NEUTROPHIL 1.6 - 8.3 10e9/L - - -   ABSOLUTE LYMPHOCYTES 0.8 - 5.3 10e9/L - - -   ABSOLUTE MONOCYTES 0.0 - 1.3 10e9/L - - -   ABSOLUTE EOSINOPHILS 0.0 - 0.7 10e9/L - - -   ABSOLUTE BASOPHILS 0.0 - 0.2 10e9/L - - -   ABS IMMATURE GRANULOCYTES 0 - 0.4 10e9/L - - -   ABSOLUTE NUCLEATED RBC - - - -     Liver Latest Ref Rng & Units 11/23/2018 10/23/2018 3/13/2018   AP 40 - 150 U/L 106 87 77   TBili 0.2 - 1.3 mg/dL 0.4 0.8 0.5   DBili 0.0 - 0.2 mg/dL - 0.2 0.1   ALT 0 - 70 U/L 27 22 27   AST 0 - 45 U/L 25 20 24   Tot Protein 6.8 - 8.8 g/dL 7.0 7.4 6.8   Albumin 3.4 - 5.0 g/dL 3.1(L) 3.5 3.2(L)     Pancreas Latest Ref Rng & Units 2/12/2012   Lipase 20 - 250 U/L 94     Iron studies Latest Ref Rng & Units 1/8/2009   Ferritin 20 - 300 ng/mL 76     [facility] Txp Virology Latest Ref Rng & Units 11/6/2018 9/12/2017 8/2/2017   CVM DNA Quant - - Plasma, EDTA anticoagulant -   CMV Quant <100 Copies/mL - - -   CMV QT Log <2.0 Log copies/mL - - -   CMV QUANT IU/ML CMVND:CMV DNA Not Detected [IU]/mL - CMV DNA Not Detected -   LOG IU/ML OF CMVQNT <2.1 [Log:IU]/mL - Not Calculated -   EBV DNA COPIES/ML EBVNEG:EBV DNA Not Detected  [Copies]/mL EBV DNA Not Detected - EBV DNA Not Detected   EBV DNA LOG OF COPIES <2.7 [Log:copies]/mL Not Calculated - Not Calculated   The Real-Time quantitative EBV assay was developed and its performance   characteristics determined by the [facility] Diagnostic Laboratory at   the [facility] in [place], [place].  The   primers and probes are Analyte Specific Reagents (ASRs) manufactured  by   [employer].   ASRs are used in many laboratory tests necessary for standard medical care and   generally do not require U.S. Food and Drug Administration approval.  The FDA   has determined that such clearance or approval is not necessary.  This test is   used for clinical purposes.  It should not be regarded as investigational or   research.   This laboratory is certified under the Clinical Laboratory Improvement   Amendments of 1988 (CLIA-88) as qualified to perform high complexity clinical   laboratory testing.   The quantitative range of this assay is 500-22,500,00 copies/mL (2.7-7.4 log   copies/mL).  A negative result does not rule out the presence of PCR inhibitors     in the patient specimen or EBV DNA nucleic acid in concentrations below the   level of detection of the assay.  Inhibition may also lead to underestimation   of viral quantitation.     Hep B Core NEG - - -   Hep B Surf - - - -            Recent Labs   Lab Test 09/12/17  0923 11/06/18  0647   DOSMPA 9pm 09.11.2017 Not Provided   MPACID 3.82* 0.55*   MPAG 69.3 29.5*       Zeenat Diaz MD   Transplant Nephrology Fellow.  10/6/2020 at 6:15 PM    Physician Attestation   I, Pradip Felipe MD, saw this patient and agree with the findings and plan of care as documented in the note.      Items personally reviewed/procedural attestation: vitals, labs and imaging and agree with the interpretation documented in the note.    Pradip Felipe MD      Again, thank you for allowing me to participate in the care of your  patient.      Sincerely,    Kidney/Pancreas Recipient

## 2020-10-06 NOTE — PROGRESS NOTES
CHRONIC TRANSPLANT NEPHROLOGY VISIT      Assessment & Plan   # DDKT: Stable  - Baseline Cr ~ 0.8-1.1 (0.7 in June)   - Proteinuria: Minimal (0.2-0.5 grams)  - Date DSA Last Checked: Not Known   Latest DSA: Not checked recently due to time from transplant  - BK Viremia: Not checked recently  - Kidney Tx Biopsy: No  - Flu shot today.    # Immunosuppression:   - On Mycophenolate 750 mg bid and prednisone 5 mg daily. Cyclosporine was discontinued few years ago given his history of skin malignancy.   - Changes: No    # Hypertension: Controlled; Goal BP: < 130/80  - Changes: No, he is only on lopressor 12.5 mg at this point.  - His BP was uncontrolled and difficult to control for many years in the past.    # History of basal cell cancer:  - 2 episodes, most recent was in late 90's.  - He is off CNI for few years now.  - Follow up with dermatology.    # Chronic hepatitis B:  - On Entecavir, reportedly controlled, he follows with hepatology.    # Anemia in Chronic Renal Disease:   - Hgb 11.9 in June 2019, dropped to 6.6 following his CABG, s/p transfusion.   - Monitor for now.    # Mineral Bone Disorder:   - PTH level: Not checked recently   - Vitamin D; level: Not checked recently, but was normal last check  Continue Ca-Vit D supplement  - Calcium; level: Normal   - Phosphorus; level: Not checked recently, but was normal last check    # CAD s/p CABG in 6/2020:  - He is undergoing cardiac rehab, doing well overall.  - F/U with cardiology.         # Kidney Ultrasound: The native kidneys US neg no masses 2019  # Skin Cancer Risk:   - Discussed sun protection and recommend regular follow up with Dermatology.   - He has an appointment with his dermatologist soon.    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  Tx: DDKT  Transplant: 10/6/1993 (Kidney), 4/18/1978 (Kidney)  Donor Type: Donation after Brain Death Donor Class: Standard Criteria  Donor  Significant changes in immunosuppression: None  Significant transplant-related complications: None    Transplant Office Phone Number: [phone]    Assessment and plan was discussed with the patient and he voiced his understanding and agreement.    Return visit: No follow-ups on file.      Chief Complaint   Mr. Ross is a 60 year old here for routine follow up.    History of Present Illness   Mr. 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 His creatinine is stable in June 2020. He had CABG done in June 2020 and currently undergoing cardiac rehab, he states he feels well overall. He denies any dyspnea , nausea or vomiting.         Recent Hospitalizations:  [x] No [] Yes    New Medical Issues: [x] No [] Yes    Decreased energy: [x] No [] Yes    Chest pain or SOB with exertion:  [x] No [] Yes    Appetite change or weight change: [x] No [] Yes    Nausea, vomiting or diarrhea:  [x] No [] Yes    Fever, sweats or chills: [x] No [] Yes    Leg swelling: [x] No [] Yes      Home BP: Not checked    Review of Systems   A comprehensive review of systems was obtained and negative, except as noted in the HPI or PMH.    Problem List   Patient Active Problem List   Diagnosis     BCC (basal cell carcinoma), trunk     JULIANE (obstructive sleep apnea)     HTN, kidney transplant related     Coronary artery disease involving native coronary artery of native heart without angina pectoris     NSTEMI (non-ST elevated myocardial infarction) (H)     Depression     Diverticulosis     Hemorrhoids     Kidney replaced by transplant     Basal cell carcinoma     Squamous cell carcinoma     Dyslipidemia     CRP elevated     Glaucoma     AION (acute ischemic optic neuropathy)     Paracentral scotoma     Hip pain     Inflamed seborrheic keratosis     Intertrigo     Chronic hepatitis B (H)     Immunosuppression (H)     Hypogonadism in male     GERD  (gastroesophageal reflux disease)     Aftercare following organ transplant     Acute midline low back pain without sciatica     Septic bursitis     Impaired mobility     Infection of lumbar spine (H)       Social History     Allergies   Allergies   Allergen Reactions     Penicillins Shortness Of Breath and Hives     Keflex [Cephalexin Hcl] Other (See Comments)     Pt could not recall reaction     Tetracycline Other (See Comments)     Patient could not recall reaction     Sulfa Drugs Rash       Medications   Current Outpatient Medications   Medication Sig     acetaminophen (TYLENOL) 325 MG tablet Take 1-2 tablets by mouth every 6 hours as needed.     albuterol (PROAIR HFA) 108 (90 Base) MCG/ACT Inhaler Inhale 2 puffs into the lungs every 6 hours as needed for shortness of breath / dyspnea or wheezing (Patient not taking: Reported on 7/7/2020)     aspirin 81 MG tablet Take 81 mg by mouth daily      atorvastatin (LIPITOR) 20 MG tablet Take 1 tablet (20 mg) by mouth daily (Patient not taking: Reported on 7/7/2020)     BETA BLOCKER NOT PRESCRIBED, INTENTIONAL, Beta Blocker not prescribed intentionally due to Bradycardia < 50 bpm without beta blocker therapy     budesonide (PULMICORT FLEXHALER) 90 MCG/ACT inhaler Inhale 2 puffs into the lungs 2 times daily (Patient not taking: Reported on 7/7/2020)     calcium citrate-vitamin D (CITRACAL) 315-200 MG-UNIT TABS Take 1 tablet by mouth daily.     clopidogrel (PLAVIX) 75 MG tablet Take 1 tablet (75 mg) by mouth daily     entecavir (BARACLUDE) 0.5 MG tablet Take 1 tablet (0.5 mg) by mouth daily     FLUoxetine (PROZAC) 10 MG capsule Take 1 capsule (10 mg) by mouth daily With 40mg daily for a total daily dose of 50mg     FLUoxetine (PROZAC) 40 MG capsule Take 1 capsule (40 mg) by mouth daily     fluticasone-salmeterol (ADVAIR) 250-50 MCG/DOSE inhaler Inhale 1 puff into the lungs every 12 hours (Patient not taking: Reported on 7/7/2020)     isosorbide mononitrate (IMDUR) 30 MG 24  hr tablet Take 0.5 tablets (15 mg) by mouth daily     latanoprost (XALATAN) 0.005 % ophthalmic solution Place 1 drop into both eyes At Bedtime     metoprolol tartrate (LOPRESSOR) 25 MG tablet TAKE 1/2 TABLET BY MOUTH TWICE DAILY     Multiple Vitamins-Iron (ONE DAILY MULTIVITAMIN/IRON) TABS Take 1 tablet by mouth daily     mycophenolate (GENERIC EQUIVALENT) 250 MG capsule TAKE 3 CAPSULES BY MOUTH TWICE DAILY     nitroGLYcerin (NITROSTAT) 0.4 MG sublingual tablet Place 0.4 mg under the tongue     Omega-3 Fatty Acids (OMEGA 3 PO) Take 1 capsule by mouth daily Dose unknown     pantoprazole (PROTONIX) 40 MG EC tablet Take 1 tablet (40 mg) by mouth daily     polyethylene glycol (MIRALAX) 17 g packet Take 17 g by mouth     predniSONE (DELTASONE) 5 MG tablet Take 1 tablet (5 mg) by mouth daily     rosuvastatin (CRESTOR) 20 MG tablet TAKE 1 TABLET BY MOUTH EVERY DAY     Current Facility-Administered Medications   Medication     lidocaine 1% with EPINEPHrine 1:100,000 injection 3 mL     lidocaine 1% with EPINEPHrine 1:100,000 injection 3 mL     There are no discontinued medications.    Physical Exam   Vital Signs:   /86  Physical Exam  Constitutional:       Appearance: Normal appearance.   HENT:      Head: Normocephalic and atraumatic.      Mouth/Throat:      Mouth: Mucous membranes are moist.   Neck:      Musculoskeletal: Neck supple.   Cardiovascular:      Rate and Rhythm: Normal rate and regular rhythm.      Heart sounds: Normal heart sounds.   Pulmonary:      Effort: Pulmonary effort is normal.      Breath sounds: Normal breath sounds.   Skin:     General: Skin is warm.   Neurological:      Mental Status: He is alert and oriented to person, place, and time.   Psychiatric:         Mood and Affect: Mood normal.             Data     Renal Latest Ref Rng & Units 12/23/2019 9/20/2019 4/2/2019   Na 133 - 144 mmol/L 138 139 134   K 3.4 - 5.3 mmol/L 3.3(L) 3.4 3.8   Cl 94 - 109 mmol/L 105 106 102   CO2 20 - 32 mmol/L 28 26  26   BUN 7 - 30 mg/dL 13 11 12   Cr 0.66 - 1.25 mg/dL 0.82 0.75 0.74   Glucose 70 - 99 mg/dL 104(H) 97 80   Ca  8.5 - 10.1 mg/dL 9.0 9.0 9.6   Mg 1.6 - 2.3 mg/dL - - -     Bone Health Latest Ref Rng & Units 11/5/2018 11/7/2017 10/3/2014   Phos 2.5 - 4.5 mg/dL 2.9 - 1.9(L)   PTHi 12 - 72 pg/mL - - -   Vit D Def 20 - 75 ug/L - 28 -     Heme Latest Ref Rng & Units 12/23/2019 9/27/2019 9/20/2019   WBC 4.0 - 11.0 10e9/L 8.8 11.2(H) 11.9(H)   Hgb 13.3 - 17.7 g/dL 15.4 - 13.9   Plt 150 - 450 10e9/L 307 - 345   ABSOLUTE NEUTROPHIL 1.6 - 8.3 10e9/L - - -   ABSOLUTE LYMPHOCYTES 0.8 - 5.3 10e9/L - - -   ABSOLUTE MONOCYTES 0.0 - 1.3 10e9/L - - -   ABSOLUTE EOSINOPHILS 0.0 - 0.7 10e9/L - - -   ABSOLUTE BASOPHILS 0.0 - 0.2 10e9/L - - -   ABS IMMATURE GRANULOCYTES 0 - 0.4 10e9/L - - -   ABSOLUTE NUCLEATED RBC - - - -     Liver Latest Ref Rng & Units 11/23/2018 10/23/2018 3/13/2018   AP 40 - 150 U/L 106 87 77   TBili 0.2 - 1.3 mg/dL 0.4 0.8 0.5   DBili 0.0 - 0.2 mg/dL - 0.2 0.1   ALT 0 - 70 U/L 27 22 27   AST 0 - 45 U/L 25 20 24   Tot Protein 6.8 - 8.8 g/dL 7.0 7.4 6.8   Albumin 3.4 - 5.0 g/dL 3.1(L) 3.5 3.2(L)     Pancreas Latest Ref Rng & Units 2/12/2012   Lipase 20 - 250 U/L 94     Iron studies Latest Ref Rng & Units 1/8/2009   Ferritin 20 - 300 ng/mL 76     [facility] Txp Virology Latest Ref Rng & Units 11/6/2018 9/12/2017 8/2/2017   CVM DNA Quant - - Plasma, EDTA anticoagulant -   CMV Quant <100 Copies/mL - - -   CMV QT Log <2.0 Log copies/mL - - -   CMV QUANT IU/ML CMVND:CMV DNA Not Detected [IU]/mL - CMV DNA Not Detected -   LOG IU/ML OF CMVQNT <2.1 [Log:IU]/mL - Not Calculated -   EBV DNA COPIES/ML EBVNEG:EBV DNA Not Detected [Copies]/mL EBV DNA Not Detected - EBV DNA Not Detected   EBV DNA LOG OF COPIES <2.7 [Log:copies]/mL Not Calculated - Not Calculated   The Real-Time quantitative EBV assay was developed and its performance   characteristics determined by the Infectious Diseases Diagnostic Laboratory at   the [place] [facility]  [facility] in [place], [place].  The   primers and probes are Analyte Specific Reagents (ASRs) manufactured  by   [employer].   ASRs are used in many laboratory tests necessary for standard medical care and   generally do not require U.S. Food and Drug Administration approval.  The FDA   has determined that such clearance or approval is not necessary.  This test is   used for clinical purposes.  It should not be regarded as investigational or   research.   This laboratory is certified under the Clinical Laboratory Improvement   Amendments of 1988 (CLIA-88) as qualified to perform high complexity clinical   laboratory testing.   The quantitative range of this assay is 500-22,500,00 copies/mL (2.7-7.4 log   copies/mL).  A negative result does not rule out the presence of PCR inhibitors     in the patient specimen or EBV DNA nucleic acid in concentrations below the   level of detection of the assay.  Inhibition may also lead to underestimation   of viral quantitation.     Hep B Core NEG - - -   Hep B Surf - - - -            Recent Labs   Lab Test 09/12/17  0923 11/06/18  0647   DOSMPA 9pm 09.11.2017 Not Provided   MPACID 3.82* 0.55*   MPAG 69.3 29.5*       Zeenat Diaz MD   Transplant Nephrology Fellow.  10/6/2020 at 6:15 PM    Physician Attestation   I, Pradip Felipe MD, saw this patient and agree with the findings and plan of care as documented in the note.      Items personally reviewed/procedural attestation: vitals, labs and imaging and agree with the interpretation documented in the note.    Pradip Felipe MD

## 2020-10-06 NOTE — NURSING NOTE
"Chief Complaint   Patient presents with     RECHECK     FOllow up TX     Vital signs:  Temp: 98.3  F (36.8  C)   BP: 132/86 Pulse: 83     SpO2: 97 %       Weight: 75.8 kg (167 lb 1.6 oz)  Estimated body mass index is 26.17 kg/m  as calculated from the following:    Height as of 1/22/20: 1.702 m (5' 7\").    Weight as of this encounter: 75.8 kg (167 lb 1.6 oz).        Veronica Moser, CMA    "

## 2020-10-08 ENCOUNTER — VIRTUAL VISIT (OUTPATIENT)
Dept: PSYCHIATRY | Facility: CLINIC | Age: 60
End: 2020-10-08
Attending: NURSE PRACTITIONER
Payer: MEDICARE

## 2020-10-08 DIAGNOSIS — F33.41 RECURRENT MAJOR DEPRESSIVE DISORDER, IN PARTIAL REMISSION (H): ICD-10-CM

## 2020-10-08 PROCEDURE — 99442 PR PHYSICIAN TELEPHONE EVALUATION 11-20 MIN: CPT | Mod: 95 | Performed by: NURSE PRACTITIONER

## 2020-10-08 RX ORDER — FLUOXETINE 40 MG/1
40 CAPSULE ORAL DAILY
Qty: 90 CAPSULE | Refills: 0 | Status: SHIPPED | OUTPATIENT
Start: 2020-10-08 | End: 2021-01-05

## 2020-10-08 RX ORDER — FLUOXETINE 10 MG/1
10 CAPSULE ORAL DAILY
Qty: 90 CAPSULE | Refills: 0 | Status: SHIPPED | OUTPATIENT
Start: 2020-10-08 | End: 2021-01-05
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
## 2020-10-12 ENCOUNTER — VIRTUAL VISIT (OUTPATIENT)
Dept: PSYCHOLOGY | Facility: CLINIC | Age: 60
End: 2020-10-12
Payer: COMMERCIAL

## 2020-10-12 ENCOUNTER — AMBULATORY - HEALTHEAST (OUTPATIENT)
Dept: CARDIAC REHAB | Facility: CLINIC | Age: 60
End: 2020-10-12

## 2020-10-12 DIAGNOSIS — Z95.1 S/P CABG (CORONARY ARTERY BYPASS GRAFT): ICD-10-CM

## 2020-10-12 DIAGNOSIS — F33.0 MILD EPISODE OF RECURRENT MAJOR DEPRESSIVE DISORDER (H): Primary | ICD-10-CM

## 2020-10-12 DIAGNOSIS — F91.8 CONDUCT DISORDER, UNDIFFERENTIATED TYPE: ICD-10-CM

## 2020-10-12 PROCEDURE — 99207 PR NO CHARGE LOS: CPT | Performed by: PSYCHOLOGIST

## 2020-10-12 PROCEDURE — 90837 PSYTX W PT 60 MINUTES: CPT | Mod: 95 | Performed by: PSYCHOLOGIST

## 2020-10-15 ENCOUNTER — COMMUNICATION - HEALTHEAST (OUTPATIENT)
Dept: GERIATRICS | Facility: CLINIC | Age: 60
End: 2020-10-15

## 2020-10-15 DIAGNOSIS — Z95.1 S/P CABG (CORONARY ARTERY BYPASS GRAFT): ICD-10-CM

## 2020-10-16 ENCOUNTER — AMBULATORY - HEALTHEAST (OUTPATIENT)
Dept: CARDIAC REHAB | Facility: CLINIC | Age: 60
End: 2020-10-16

## 2020-10-16 DIAGNOSIS — Z95.1 S/P CABG (CORONARY ARTERY BYPASS GRAFT): ICD-10-CM

## 2020-10-19 NOTE — PROGRESS NOTES
[facility] -  Case Progress Note      Client Name: Jerad Ross  YOB: 1960  MRN:  [id number]  Treating Provider: Julita Wisdom LP  Type of Session: Individual  Present in Session: client  Number of Minutes: 55    Start time:1:30 pm  End time: 2:25pm    Telemedicine Visit: The patient's condition can be safely assessed and treated via synchronous audio and visual telemedicine encounter.      Reason for Telemedicine Visit: Covid-19    Originating Site (Patient Location): Patient's home    Distant Site (Provider Location): Provider Remote Setting    Consent:  The patient/guardian has verbally consented to: the potential risks and benefits of telemedicine (video visit) versus in person care; bill my insurance or make self-payment for services provided; and responsibility for payment of non-covered services.     Mode of Communication:  Video Conference via [employer], doxy    As the provider I attest to compliance with applicable laws and regulations related to telemedicine.        Date of Service:10/12/20     Health Maintenance Summary - Mental Health Treatment Plan       Status Date      MENTAL HEALTH TX PLAN Next Due 10/21/2020      Done 11/21/2019 HIM MENTAL HEALTH TX PLAN SCAN        Current Symptoms/Status:  Thoughts and urges to act out sexually, boundary violation with fantasy and pornography,  distress about relationship, low energy, anxiety and stress about recent heart bypass surgery and recovery, depression, low energy,onflict and tension with his wife due to his past sexual behavior and the impact on the relationship,  financial distress.    Progress Toward Treatment Goals:   Client reported progress with seeking out support after boundary violation.     Intervention: Modality and Description:  Provided support and feedback. Used CBT to process thoughts and urges, depression, and health issues.  Client shared he had a boundary violation with looking at pornography.  He shared it happened at night. He called support people afterwards and came up with strategies to intervene. He shared this is just where he goes when he is experiencing anxiety. We discussed triggers and vulnerabilities. He shared using visual imagery of other things did not work. We talked about his struggle with really accepting his powerlessness. Discussed shame regarding his sexual template regarding sexual fantasy. He shared about these fantasies during the session. Provided education regarding sexual fantasy and behavior. Discussed his childhood experiences of being sick and how this impacted his sexual development. He shared he has decided to wait to work on his relationship with his wife until he has steady abstinence.    response to Intervention:  Client reported gaining insight and validation.    Assignment:  Intervene on urges at bedtime.    Interactive Complexity:  There are four specific communication difficulties that complicate the work of the primary psychiatric procedure.  Interactive complexity (+86409) may be reported when at least one of these difficulties is present.    Communication difficulties present during current the psychiatric procedure include:  1. None.    Diagnosis:  Encounter Diagnoses   Name Primary?     Mild episode of recurrent major depressive disorder (H) Yes     Other Specified Disruptive, Impulse-Control, and Conduct Disorder          Plan / Need for Future Services:  Return for therapy in 2 weeks.      Julita Wisdom Psyd,  LP

## 2020-10-20 ENCOUNTER — TELEPHONE (OUTPATIENT)
Dept: TRANSPLANT | Facility: CLINIC | Age: 60
End: 2020-10-20

## 2020-10-20 DIAGNOSIS — Z94.0 KIDNEY TRANSPLANTED: ICD-10-CM

## 2020-10-20 RX ORDER — MYCOPHENOLATE MOFETIL 250 MG/1
750 CAPSULE ORAL 2 TIMES DAILY
Qty: 180 CAPSULE | Refills: 1 | Status: SHIPPED | OUTPATIENT
Start: 2020-10-20 | End: 2020-12-24

## 2020-10-20 NOTE — TELEPHONE ENCOUNTER
Provider Call: Medication Refill  Route to LPN    mycophenolate (GENERIC EQUIVALENT) 250 MG capsule 180 capsule 11 10/11/2019       [id number] [place] [id number] AT [facility] [address] Phone:  [phone]   Fax:  [phone]            When will the patient be out of this medication?: Less than 24 hours ([facility] LPN, then page if no answer)  Callback needed? Yes    Return Call Needed  Same as documented in contacts section  When to return call?: Same day: Route High Priority

## 2020-10-26 ENCOUNTER — VIRTUAL VISIT (OUTPATIENT)
Dept: PSYCHOLOGY | Facility: CLINIC | Age: 60
End: 2020-10-26
Payer: MEDICARE

## 2020-10-26 DIAGNOSIS — F91.8 CONDUCT DISORDER, UNDIFFERENTIATED TYPE: ICD-10-CM

## 2020-10-26 DIAGNOSIS — F33.0 MILD EPISODE OF RECURRENT MAJOR DEPRESSIVE DISORDER (H): Primary | ICD-10-CM

## 2020-10-26 PROCEDURE — 99207 PR NO CHARGE LOS: CPT | Performed by: PSYCHOLOGIST

## 2020-10-26 PROCEDURE — 90837 PSYTX W PT 60 MINUTES: CPT | Mod: 95 | Performed by: PSYCHOLOGIST

## 2020-10-26 NOTE — PROGRESS NOTES
[facility] -  Case Progress Note      Client Name: Jerad Ross  YOB: 1960  MRN:  [id number]  Treating Provider: Julita Wisdom LP  Type of Session: Individual  Present in Session: client  Number of Minutes: 55    Start time:1:30 pm  End time: 2:25 pm    Telemedicine Visit: The patient's condition can be safely assessed and treated via synchronous audio and visual telemedicine encounter.      Reason for Telemedicine Visit: Covid-19    Originating Site (Patient Location): Patient's home    Distant Site (Provider Location): Provider Remote Setting    Consent:  The patient/guardian has verbally consented to: the potential risks and benefits of telemedicine (video visit) versus in person care; bill my insurance or make self-payment for services provided; and responsibility for payment of non-covered services.     Mode of Communication:  Video Conference via [employer], doxy    As the provider I attest to compliance with applicable laws and regulations related to telemedicine.        Date of Service:10/26/20     Health Maintenance Summary - Mental Health Treatment Plan       Status Date      MENTAL HEALTH TX PLAN Overdue 10/21/2020      Done 11/21/2019 HIM MENTAL HEALTH TX PLAN SCAN        Current Symptoms/Status:  Thoughts and urges to act out sexually, boundary violation with fantasy and pornography, but none in the last few days, distress about relationship, low energy, anxiety and stress about recent heart bypass surgery and recovery, depression, low energy,onflict and tension with his wife due to his past sexual behavior and the impact on the relationship,  financial distress.    Progress Toward Treatment Goals:   Client reported progress with asking his doctor about naltrexone and increasing the frequency of his meetings.    Intervention: Modality and Description:  Provided support and feedback. Used CBT to process thoughts and urges, depression, and health issues.  Client  shared he had a couple of boundary violation with looking at pornography, but has maintained boundaries for the last few days.  He shared that he has increased the frequency of his contact list 12 support numbers and meetings to try to help him intervene on this pattern.  He did send a message to his doctor asking about naltrexone and has not heard response as of yet.  He shared it has been difficult for him to think about using naltrexone as that team him would mean that he is not doing recovery.  We talked about how these 2 things can work together and how naltrexone works that this separate from the other work he is doing.  He processed a recent visit with his  and how he interpreted some of the information.  He did some work on looking at what his expectations were and assisted him in looking at what part of this is a pattern for him regarding expectations and his needs.    response to Intervention:  Client reported gaining insight and validation.    Assignment:  Intervene on urges at bedtime.    Interactive Complexity:  There are four specific communication difficulties that complicate the work of the primary psychiatric procedure.  Interactive complexity (+65596) may be reported when at least one of these difficulties is present.    Communication difficulties present during current the psychiatric procedure include:  1. None.    Diagnosis:  Encounter Diagnoses   Name Primary?     Mild episode of recurrent major depressive disorder (H) Yes     Other Specified Disruptive, Impulse-Control, and Conduct Disorder          Plan / Need for Future Services:  Return for therapy in 2 weeks.      Julita Wisdom Psyd,  LP

## 2020-10-28 ENCOUNTER — OFFICE VISIT (OUTPATIENT)
Dept: INTERNAL MEDICINE | Facility: CLINIC | Age: 60
End: 2020-10-28
Payer: COMMERCIAL

## 2020-10-28 VITALS
DIASTOLIC BLOOD PRESSURE: 80 MMHG | HEART RATE: 70 BPM | OXYGEN SATURATION: 96 % | WEIGHT: 167 LBS | BODY MASS INDEX: 26.16 KG/M2 | SYSTOLIC BLOOD PRESSURE: 115 MMHG

## 2020-10-28 DIAGNOSIS — Z48.298 AFTERCARE FOLLOWING ORGAN TRANSPLANT: ICD-10-CM

## 2020-10-28 DIAGNOSIS — E78.5 DYSLIPIDEMIA: ICD-10-CM

## 2020-10-28 DIAGNOSIS — M89.9 DISORDER OF BONE AND CARTILAGE: ICD-10-CM

## 2020-10-28 DIAGNOSIS — Z79.899 ENCOUNTER FOR LONG-TERM CURRENT USE OF MEDICATION: ICD-10-CM

## 2020-10-28 DIAGNOSIS — M94.9 DISORDER OF BONE AND CARTILAGE: ICD-10-CM

## 2020-10-28 DIAGNOSIS — R05.9 COUGH: ICD-10-CM

## 2020-10-28 DIAGNOSIS — I10 HYPERTENSION, UNSPECIFIED TYPE: ICD-10-CM

## 2020-10-28 DIAGNOSIS — E29.1 HYPOGONADISM IN MALE: ICD-10-CM

## 2020-10-28 DIAGNOSIS — R73.02 IGT (IMPAIRED GLUCOSE TOLERANCE): ICD-10-CM

## 2020-10-28 DIAGNOSIS — F32.A DEPRESSION, UNSPECIFIED DEPRESSION TYPE: ICD-10-CM

## 2020-10-28 DIAGNOSIS — Z94.0 KIDNEY REPLACED BY TRANSPLANT: ICD-10-CM

## 2020-10-28 DIAGNOSIS — I25.10 CORONARY ARTERY DISEASE INVOLVING NATIVE CORONARY ARTERY OF NATIVE HEART WITHOUT ANGINA PECTORIS: ICD-10-CM

## 2020-10-28 DIAGNOSIS — R06.2 WHEEZING: ICD-10-CM

## 2020-10-28 DIAGNOSIS — B18.1 CHRONIC HEPATITIS B (H): ICD-10-CM

## 2020-10-28 LAB
ALBUMIN SERPL-MCNC: 3.3 G/DL (ref 3.4–5)
ALP SERPL-CCNC: 87 U/L (ref 40–150)
ALT SERPL W P-5'-P-CCNC: 21 U/L (ref 0–70)
ANION GAP SERPL CALCULATED.3IONS-SCNC: 6 MMOL/L (ref 3–14)
AST SERPL W P-5'-P-CCNC: 21 U/L (ref 0–45)
BILIRUB SERPL-MCNC: 0.6 MG/DL (ref 0.2–1.3)
BUN SERPL-MCNC: 11 MG/DL (ref 7–30)
CALCIUM SERPL-MCNC: 9.3 MG/DL (ref 8.5–10.1)
CHLORIDE SERPL-SCNC: 104 MMOL/L (ref 94–109)
CHOLEST SERPL-MCNC: 154 MG/DL
CO2 SERPL-SCNC: 28 MMOL/L (ref 20–32)
CREAT SERPL-MCNC: 0.74 MG/DL (ref 0.66–1.25)
DEPRECATED CALCIDIOL+CALCIFEROL SERPL-MC: 36 UG/L (ref 20–75)
ERYTHROCYTE [DISTWIDTH] IN BLOOD BY AUTOMATED COUNT: 16 % (ref 10–15)
GFR SERPL CREATININE-BSD FRML MDRD: >90 ML/MIN/{1.73_M2}
GLUCOSE SERPL-MCNC: 82 MG/DL (ref 70–99)
HBA1C MFR BLD: 5.8 % (ref 0–5.6)
HCT VFR BLD AUTO: 44.3 % (ref 40–53)
HDLC SERPL-MCNC: 65 MG/DL
HGB BLD-MCNC: 13.4 G/DL (ref 13.3–17.7)
LDLC SERPL CALC-MCNC: 63 MG/DL
MCH RBC QN AUTO: 26.9 PG (ref 26.5–33)
MCHC RBC AUTO-ENTMCNC: 30.2 G/DL (ref 31.5–36.5)
MCV RBC AUTO: 89 FL (ref 78–100)
NONHDLC SERPL-MCNC: 89 MG/DL
PLATELET # BLD AUTO: 306 10E9/L (ref 150–450)
POTASSIUM SERPL-SCNC: 3.4 MMOL/L (ref 3.4–5.3)
PROT SERPL-MCNC: 7.2 G/DL (ref 6.8–8.8)
PROT UR-MCNC: 0.28 G/L
PROT/CREAT 24H UR: 0.23 G/G CR (ref 0–0.2)
RBC # BLD AUTO: 4.99 10E12/L (ref 4.4–5.9)
SODIUM SERPL-SCNC: 138 MMOL/L (ref 133–144)
TRIGL SERPL-MCNC: 127 MG/DL
VIT B12 SERPL-MCNC: 465 PG/ML (ref 193–986)
WBC # BLD AUTO: 6.1 10E9/L (ref 4–11)

## 2020-10-28 PROCEDURE — 83036 HEMOGLOBIN GLYCOSYLATED A1C: CPT | Performed by: PATHOLOGY

## 2020-10-28 PROCEDURE — 82306 VITAMIN D 25 HYDROXY: CPT | Performed by: PATHOLOGY

## 2020-10-28 PROCEDURE — 80053 COMPREHEN METABOLIC PANEL: CPT | Performed by: PATHOLOGY

## 2020-10-28 PROCEDURE — 82607 VITAMIN B-12: CPT | Performed by: PATHOLOGY

## 2020-10-28 PROCEDURE — 36415 COLL VENOUS BLD VENIPUNCTURE: CPT | Performed by: PATHOLOGY

## 2020-10-28 PROCEDURE — 84270 ASSAY OF SEX HORMONE GLOBUL: CPT | Performed by: PATHOLOGY

## 2020-10-28 PROCEDURE — 80061 LIPID PANEL: CPT | Performed by: PATHOLOGY

## 2020-10-28 PROCEDURE — 84403 ASSAY OF TOTAL TESTOSTERONE: CPT | Performed by: PATHOLOGY

## 2020-10-28 PROCEDURE — 84156 ASSAY OF PROTEIN URINE: CPT | Performed by: PATHOLOGY

## 2020-10-28 PROCEDURE — 99214 OFFICE O/P EST MOD 30 MIN: CPT | Mod: GC | Performed by: INTERNAL MEDICINE

## 2020-10-28 PROCEDURE — 85027 COMPLETE CBC AUTOMATED: CPT | Performed by: PATHOLOGY

## 2020-10-28 RX ORDER — ALBUTEROL SULFATE 90 UG/1
2 AEROSOL, METERED RESPIRATORY (INHALATION) EVERY 6 HOURS PRN
Qty: 1 INHALER | Refills: 2 | Status: SHIPPED | OUTPATIENT
Start: 2020-10-28 | End: 2022-10-13

## 2020-10-28 ASSESSMENT — ANXIETY QUESTIONNAIRES
6. BECOMING EASILY ANNOYED OR IRRITABLE: NOT AT ALL
3. WORRYING TOO MUCH ABOUT DIFFERENT THINGS: NOT AT ALL
IF YOU CHECKED OFF ANY PROBLEMS ON THIS QUESTIONNAIRE, HOW DIFFICULT HAVE THESE PROBLEMS MADE IT FOR YOU TO DO YOUR WORK, TAKE CARE OF THINGS AT HOME, OR GET ALONG WITH OTHER PEOPLE: SOMEWHAT DIFFICULT
5. BEING SO RESTLESS THAT IT IS HARD TO SIT STILL: NOT AT ALL
1. FEELING NERVOUS, ANXIOUS, OR ON EDGE: SEVERAL DAYS
GAD7 TOTAL SCORE: 2
2. NOT BEING ABLE TO STOP OR CONTROL WORRYING: NOT AT ALL
7. FEELING AFRAID AS IF SOMETHING AWFUL MIGHT HAPPEN: NOT AT ALL

## 2020-10-28 ASSESSMENT — PATIENT HEALTH QUESTIONNAIRE - PHQ9
SUM OF ALL RESPONSES TO PHQ QUESTIONS 1-9: 5
5. POOR APPETITE OR OVEREATING: SEVERAL DAYS

## 2020-10-28 NOTE — PROGRESS NOTES
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 Patient also wanted to inquire about effective ways to exercise and ways to improve muscle wasting especially given his arthritis and chronic use of prednisone. When he filled out the PHQ questionnaire he answered \"1\" to thoughts of being dead or harming himself. When asked about this he said he does have some thoughts but doesn't plan on acting on them. He sees a counselor and discusses it with them.  Has already received the flu shot and is up to date on his pneumococcal vaccines.    Current Outpatient Medications   Medication     acetaminophen (TYLENOL) 325 MG tablet     albuterol (PROAIR HFA) 108 (90 Base) MCG/ACT inhaler     aspirin 81 MG tablet     BETA BLOCKER NOT PRESCRIBED, INTENTIONAL,     budesonide (PULMICORT FLEXHALER) 90 MCG/ACT inhaler     calcium citrate-vitamin D (CITRACAL) 315-200 MG-UNIT TABS     clopidogrel (PLAVIX) 75 MG tablet     entecavir (BARACLUDE) 0.5 MG tablet     FLUoxetine (PROZAC) 10 MG capsule     FLUoxetine (PROZAC) 40 MG capsule     fluticasone-salmeterol (ADVAIR) 250-50 MCG/DOSE inhaler     latanoprost (XALATAN) 0.005 % ophthalmic solution     metoprolol tartrate (LOPRESSOR) 25 MG tablet     Multiple Vitamins-Iron (ONE DAILY MULTIVITAMIN/IRON) TABS     mycophenolate (GENERIC EQUIVALENT) 250 MG capsule     Omega-3 Fatty Acids (OMEGA 3 PO)     pantoprazole (PROTONIX) 40 MG EC tablet     polyethylene glycol (MIRALAX) 17 g packet     predniSONE (DELTASONE) 5 MG tablet     rosuvastatin (CRESTOR) 20 MG tablet     atorvastatin (LIPITOR) 20 MG tablet     isosorbide mononitrate (IMDUR) 30 MG 24 hr tablet     nitroGLYcerin (NITROSTAT) 0.4 MG sublingual tablet     Current Facility-Administered Medications   Medication     lidocaine 1% with EPINEPHrine 1:100,000 injection 3 mL     " lidocaine 1% with EPINEPHrine 1:100,000 injection 3 mL          Allergies   Allergen Reactions     Penicillins Shortness Of Breath and Hives     Keflex [Cephalexin Hcl] Other (See Comments)     Pt could not recall reaction     Tetracycline Other (See Comments)     Patient could not recall reaction     Sulfa Drugs Rash         Review of systems:    General:  No weight loss, appetite loss, fatigue, tiredness, general weakness  Eyes:  No vision loss  Ears:  No hearing loss, tinnitus, ear pain  Nose:  No nasal discharge, sneezing, sinus congestion  Throat:  No throat pain, trouble swallowing or painful swallowing  Head:  No rashes, pain, deformities  Pulmonary:  No SOB, CHAPPELL, wheezing, pleurisy, hemoptysis, cough, snoring/apnea and daytime sleepiness  Cardiovascular:  No palpitations, passing out spells (syncope), chest pain/pressure, leg swelling, orthopnea, PND  GI:  No nausea, vomiting, abdominal pain, diarrhea, constipation, rectal bleeding, heartburn/reflux  : No genital rashes or lesions, penile discharge, erectile dysfunction, testicular masses, hernias, urinary frequency, hesitancy, urgency, incomplete emptying, nocturia, incontinence, flank pain  Heme/lymph:  No lymph node swelling or easy bruising  Endocrine:  No new intolerance to heat or cold, excessive thirst or urination  Skin:  No skin lesions or concerning moles,rashes, jaundice.    Allergic: no pruritis, rashes, conjunctival/oropharyngial swelling/redness, rhinorrhea   M/S:  No new arthralgias, myalgias, joint swelling, redness or deformities  Neuro: No new headaches, dizziness or vertigo, numbness, tingling, weakness, unsteadiness, speech slurring, confusion, memory loss, problems with coordination, tremors  Psych:  No new depression, anxiety, insomnia.    OBJECTIVE:    /80   Pulse 70   Wt 75.8 kg (167 lb)   SpO2 96%   BMI 26.16 kg/m     Wt Readings from Last 1 Encounters:   10/28/20 75.8 kg (167 lb)     Physical Examination:    General:   Conversant, generally healthy appearing, no acute distress  Head: atraumatic  Eyes:  Pupils 2-3 mm, sclera white, EOM's full, conjunctiva moist, no periorbital swelling    Lungs:  Clear to auscultation throughout, no wheezes, rhonchi or rales. Normal respiratory effort and no intercostal retractions.  C/V:  Regular rate and rhythm, no murmurs, rubs or gallops.  No JVD, no carotid bruits. Radial and DP pulses 2+, equal and regular.  Abdomen:  Not distended.  Bowel sounds active.  No tenderness, no hepatosplenomegaly or masses.  No CVA tenderness or masses.  Neuro: Alert and oriented, face symmetric. Able to get on/off exam table without assistance.  Strength grossly intact. No tremor.  Gait steady.   M/S:   No joint deformities noted.  No joint swelling.  Skin:   Normal temperature., turgor and texture. No rashes, suspicious lesions, or jaundice on exposed skin surfaces.   Extremities:  No peripheral edema, no digital cyanosis  Psych:  Alert and oriented. Appropriate affect.  Not psychomotor slowed.  No signs of anxiety or agitation.     ASSESSMENT/PLAN:    Mr Jerad Ross was seen today for recheck medication and labs check.    Diagnoses and all orders for this visit:    Asthma  -     budesonide (PULMICORT FLEXHALER) 90 MCG/ACT inhaler; Inhale 2 puffs into the lungs 2 times daily    Cough  -     fluticasone-salmeterol (ADVAIR) 250-50 MCG/DOSE inhaler; Inhale 1 puff into the lungs every 12 hours  -     albuterol (PROAIR HFA) 108 (90 Base) MCG/ACT inhaler; Inhale 2 puffs into the lungs every 6 hours as needed for shortness of breath / dyspnea or wheezing    Wheezing  -     albuterol (PROAIR HFA) 108 (90 Base) MCG/ACT inhaler; Inhale 2 puffs into the lungs every 6 hours as needed for shortness of breath / dyspnea or wheezing    Labs check  Patient's labs looked good except a HbA1c of 5.8% (last one was 5.6% a few months ago). Patient was advised on regular exercise and a balanced diet rich in fruits and vegetables. We  discussed the controversial use of GH for muscle building and did not recommend it for the patient and he is agreeable.    Pt should return to clinic for f/u with Dr Perry in one year.  Pt was seen and plan of care discussed with Dr Perry.    Sofi Cruz MD  Oct 28, 2020  Internal Medicine PGY-1 resident  [phone]    Pt was seen and examined with Dr. Cruz.  I agree with her documentation as noted above.    My additional comments: discussed GH and T replacement and reviewed labs    Aston Perry MD

## 2020-10-28 NOTE — NURSING NOTE
Chief Complaint   Patient presents with     Recheck Medication     pt had labs drawn this morning. Discuss results. follow up     Kimberly Nissen, EMT at 9:37 AM on 10/28/2020

## 2020-10-29 LAB
SHBG SERPL-SCNC: 42 NMOL/L (ref 11–80)
TESTOST FREE SERPL-MCNC: 3.38 NG/DL (ref 4.7–24.4)
TESTOST SERPL-MCNC: 207 NG/DL (ref 240–950)

## 2020-10-29 ASSESSMENT — ANXIETY QUESTIONNAIRES: GAD7 TOTAL SCORE: 2

## 2020-11-04 DIAGNOSIS — F33.41 RECURRENT MAJOR DEPRESSIVE DISORDER, IN PARTIAL REMISSION (H): Primary | ICD-10-CM

## 2020-11-04 RX ORDER — NALTREXONE HYDROCHLORIDE 50 MG/1
50 TABLET, FILM COATED ORAL DAILY
Qty: 30 TABLET | Refills: 2 | Status: SHIPPED | OUTPATIENT
Start: 2020-11-04 | End: 2021-01-11

## 2020-11-06 RX ORDER — NALTREXONE HYDROCHLORIDE 50 MG/1
50 TABLET, FILM COATED ORAL DAILY
Qty: 30 TABLET | Refills: 2 | Status: SHIPPED | OUTPATIENT
Start: 2020-11-06 | End: 2021-01-11

## 2020-11-09 ENCOUNTER — VIRTUAL VISIT (OUTPATIENT)
Dept: PSYCHOLOGY | Facility: CLINIC | Age: 60
End: 2020-11-09
Payer: COMMERCIAL

## 2020-11-09 DIAGNOSIS — F33.1 MODERATE EPISODE OF RECURRENT MAJOR DEPRESSIVE DISORDER (H): Primary | ICD-10-CM

## 2020-11-09 DIAGNOSIS — F91.8 CONDUCT DISORDER, UNDIFFERENTIATED TYPE: ICD-10-CM

## 2020-11-09 PROCEDURE — 90834 PSYTX W PT 45 MINUTES: CPT | Mod: 95 | Performed by: PSYCHOLOGIST

## 2020-11-09 PROCEDURE — 99207 PR NO CHARGE LOS: CPT | Performed by: PSYCHOLOGIST

## 2020-11-10 ENCOUNTER — OFFICE VISIT (OUTPATIENT)
Dept: DERMATOLOGY | Facility: CLINIC | Age: 60
End: 2020-11-10
Payer: MEDICARE

## 2020-11-10 DIAGNOSIS — L82.1 SK (SEBORRHEIC KERATOSIS): ICD-10-CM

## 2020-11-10 DIAGNOSIS — L72.0 EIC (EPIDERMAL INCLUSION CYST): ICD-10-CM

## 2020-11-10 DIAGNOSIS — L28.1 PRURIGO NODULARIS: Primary | ICD-10-CM

## 2020-11-10 PROCEDURE — 17110 DESTRUCTION B9 LES UP TO 14: CPT | Mod: GC | Performed by: DERMATOLOGY

## 2020-11-10 PROCEDURE — 99213 OFFICE O/P EST LOW 20 MIN: CPT | Mod: 25 | Performed by: DERMATOLOGY

## 2020-11-10 ASSESSMENT — PAIN SCALES - GENERAL: PAINLEVEL: NO PAIN (0)

## 2020-11-10 NOTE — LETTER
11/10/2020       RE: Jerad Ross  [address]  [place] [address]     Dear Colleague,    Thank you for referring your patient, Jerad Ross, to the [facility] [place] at [facility]. Please see a copy of my visit note below.    [facility] Dermatology Note      Dermatology Problem List:    1.History of kidney transplant 10/6/93  - Immunosuppression: prednisone 5mg, mycophenolate 750 mg BID (previously on cyclosporine, imuran)  2. History of BCC  - Right axilla s/p MMS 4/9/12  3. History of SCC  - Central chest s/p excision 2009  - Unspecified location s/p excision 1999  3. Seborrheic and verrucous keratoses   - Monitor  4. Filiform warts   - left lateral eyelid, left alteral canthus, left temple s/p cryo 3/15/17  5. Acrochordons   - L anterior neck s/p cryo 10/8/19  6. L thigh epidermal inclusion cyst   - Monitor  1.Prurigo nodularis, left forearm  - s/p cryotherapy 11/10/20    Encounter Date: Nov 10, 2020    CC:   Chief Complaint   Patient presents with     Skin Check     Jerad is here today for a skin check- Jerad notes an area of concern on his forearm.        History of Present Illness:  Mr. Jerad Ross is a 60 year old male with past medical history significant for kidney tranplant in 1993 who presents as a follow-up for annual skin check. The patient was last seen on 10/8/2019 when he underwent cryotherapy of AKs on the left shoulder, left arm, left hand as well as cryotherapy of two irritated acrochordons on the left anterior neck.     Today, he is doing well. He has one lesion of concern on his left forearm. This spot has been present for the past few months and is intermittently itchy. He has picked at it and caused bleeding before. It does seem to be getting a little smaller in size. It is not painful to touch.     He has been using Cetaphil moisturizer about four times per week. He has not had  fever, chills, shortness of breath, or other acute symptoms.     Past Medical History:   Patient Active Problem List   Diagnosis     BCC (basal cell carcinoma), trunk     JULIANE (obstructive sleep apnea)     HTN, kidney transplant related     Coronary artery disease involving native coronary artery of native heart without angina pectoris     NSTEMI (non-ST elevated myocardial infarction) (H)     Depression     Diverticulosis     Hemorrhoids     Kidney replaced by transplant     Basal cell carcinoma     Squamous cell carcinoma     Dyslipidemia     CRP elevated     Glaucoma     AION (acute ischemic optic neuropathy)     Paracentral scotoma     Hip pain     Inflamed seborrheic keratosis     Intertrigo     Chronic hepatitis B (H)     Immunosuppression (H)     Hypogonadism in male     GERD (gastroesophageal reflux disease)     Aftercare following organ transplant     Acute midline low back pain without sciatica     Septic bursitis     Impaired mobility     Infection of lumbar spine (H)     Past Medical History:   Diagnosis Date     AION (acute ischemic optic neuropathy)      Anemia in chronic renal disease      Avascular necrosis of bones of both hips (H)     s/p bilateral hip replacements     Basal cell carcinoma      Chronic hepatitis B (H)      Clostridium difficile colitis      Coronary artery disease involving native coronary artery of native heart without angina pectoris 6/17/2014    Coronary angiogram 6/17/14: Severe distal 3-vessel disease involving small vessels, not amenable to PCI or CABG.     CRP elevated      Depression      Diverticulosis      Dyslipidemia      FSGS (focal segmental glomerulosclerosis)      Gastric ulcer with hemorrhage 2/12/12     GERD (gastroesophageal reflux disease)      Glaucoma     OHTN     Hemorrhoids      Hypertension secondary to other renal disorders      Hypogonadism in male      Immunosuppressed status (H)      Kidney replaced by transplant     focal glomerulosclerosis      NSTEMI  (non-ST elevated myocardial infarction) (H) 6/17/2014     JULIANE (obstructive sleep apnea)     Doesn't use CPAP     Paracentral scotoma     LE     Secondary renal hyperparathyroidism (H)      Squamous cell carcinoma      Past Surgical History:   Procedure Laterality Date     APPENDECTOMY       Cardiac Bypass surgery  06/08/2020    [facility]      CATARACT IOL, RT/LT  4/19/2000    RE     CATARACT IOL, RT/LT  6/1/2000    LE     COLECTOMY SUBTOTAL  1983    10 cm, diverticulitis     COLONOSCOPY  2/13/2012    Procedure:COLONOSCOPY; Surgeon:SLOAN GALLARDO; Location: GI     COLONOSCOPY N/A 1/22/2020    Procedure: Colonoscopy, With Polypectomy And Biopsy;  Surgeon: Aston Kiran MD;  Location:  GI     ESOPHAGOSCOPY, GASTROSCOPY, DUODENOSCOPY (EGD), COMBINED  2/13/2012    Procedure:COMBINED ESOPHAGOSCOPY, GASTROSCOPY, DUODENOSCOPY (EGD); Surgeon:SLOAN GALLARDO; Location: GI     EXTRACAPSULAR CATARACT EXTRATION WITH INTRAOCULAR LENS IMPLANT  4-20-10, 6-1-10    Rt, Lt     HERNIA REPAIR  1995    Lt inguinal     HIP SURGERY      1981, bilat MITZI, revised 2001, 2005     KIDNEY SURGERY  1978 and 1993    transplant     KNEE SURGERY  1983, 1987    bilat TKA     MOHS MICROGRAPHIC PROCEDURE       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, auxiliary spleen     TONSILLECTOMY         Social History:  Patient reports that he quit smoking about 32 years ago. He has a 9.00 pack-year smoking history. He has quit using smokeless tobacco. He reports current alcohol use. He reports that he does not use drugs.    Family History:  Family History   Problem Relation Age of Onset     Cardiovascular Father         AI with valve repair     Hypertension Father      Kidney Disease Father      Cancer Paternal Grandmother         ovarian ca     Cerebrovascular Disease Paternal Grandfather      Cerebrovascular Disease Maternal Grandfather      Kidney Disease Paternal Aunt      Skin Cancer No family hx of      Glaucoma No family  hx of      Macular Degeneration No family hx of      Amblyopia No family hx of      Melanoma No family hx of        Medications:  Current Outpatient Medications   Medication Sig Dispense Refill     acetaminophen (TYLENOL) 325 MG tablet Take 1-2 tablets by mouth every 6 hours as needed.       albuterol (PROAIR HFA) 108 (90 Base) MCG/ACT inhaler Inhale 2 puffs into the lungs every 6 hours as needed for shortness of breath / dyspnea or wheezing 1 Inhaler 2     aspirin 81 MG tablet Take 81 mg by mouth daily        BETA BLOCKER NOT PRESCRIBED, INTENTIONAL, Beta Blocker not prescribed intentionally due to Bradycardia < 50 bpm without beta blocker therapy  0     budesonide (PULMICORT FLEXHALER) 90 MCG/ACT inhaler Inhale 2 puffs into the lungs 2 times daily 1 each 11     calcium citrate-vitamin D (CITRACAL) 315-200 MG-UNIT TABS Take 1 tablet by mouth daily.       clopidogrel (PLAVIX) 75 MG tablet Take 1 tablet (75 mg) by mouth daily 90 tablet 3     entecavir (BARACLUDE) 0.5 MG tablet Take 1 tablet (0.5 mg) by mouth daily 90 tablet 3     FLUoxetine (PROZAC) 10 MG capsule Take 1 capsule (10 mg) by mouth daily With 40mg daily for a total daily dose of 50mg 90 capsule 0     FLUoxetine (PROZAC) 40 MG capsule Take 1 capsule (40 mg) by mouth daily 90 capsule 0     fluticasone-salmeterol (ADVAIR) 250-50 MCG/DOSE inhaler Inhale 1 puff into the lungs every 12 hours 60 Inhaler 11     latanoprost (XALATAN) 0.005 % ophthalmic solution Place 1 drop into both eyes At Bedtime 2.5 mL 4     metoprolol tartrate (LOPRESSOR) 25 MG tablet TAKE 1/2 TABLET BY MOUTH TWICE DAILY       Multiple Vitamins-Iron (ONE DAILY MULTIVITAMIN/IRON) TABS Take 1 tablet by mouth daily       mycophenolate (GENERIC EQUIVALENT) 250 MG capsule Take 3 capsules (750 mg) by mouth 2 times daily 180 capsule 1     naltrexone (DEPADE/REVIA) 50 MG tablet Take 1 tablet (50 mg) by mouth daily 30 tablet 2     naltrexone (DEPADE/REVIA) 50 MG tablet Take 1 tablet (50 mg) by mouth  daily 30 tablet 2     nitroGLYcerin (NITROSTAT) 0.4 MG sublingual tablet Place 0.4 mg under the tongue       Omega-3 Fatty Acids (OMEGA 3 PO) Take 1 capsule by mouth daily Dose unknown       pantoprazole (PROTONIX) 40 MG EC tablet Take 1 tablet (40 mg) by mouth daily 90 tablet 2     polyethylene glycol (MIRALAX) 17 g packet Take 17 g by mouth       predniSONE (DELTASONE) 5 MG tablet Take 1 tablet (5 mg) by mouth daily 90 tablet 3     rosuvastatin (CRESTOR) 20 MG tablet TAKE 1 TABLET BY MOUTH EVERY DAY       isosorbide mononitrate (IMDUR) 30 MG 24 hr tablet Take 0.5 tablets (15 mg) by mouth daily 45 tablet 3        Allergies   Allergen Reactions     Penicillins Shortness Of Breath and Hives     Keflex [Cephalexin Hcl] Other (See Comments)     Pt could not recall reaction     Tetracycline Other (See Comments)     Patient could not recall reaction     Sulfa Drugs Rash     Review of Systems:  -Skin Establ Pt: The patient denies any new rash, pruritus, or lesions that are symptomatic, changing or bleeding, except as per HPI.  -Constitutional: Otherwise feeling well today, in usual state of health.  -HEENT: Patient denies nonhealing oral sores.  -Skin: As above in HPI. No additional skin concerns.    Physical exam:  Vitals: There were no vitals taken for this visit.  GEN: This is a well developed, well-nourished male in no acute distress, in a pleasant mood.    SKIN: Full skin, which includes the head/face, both arms, chest, back, abdomen,both legs, genitalia and/or groin buttocks, digits and/or nails, was examined.  -Decker skin type: II  -There are yellow oily papules with central umbilication located on the forehead and neck.  -There are waxy stuck on tan to brown papules diffusely across his face, chest, abdomen, back, arms, and legs that are too numerous to count  -Within the superior end of the TKA scar on L leg, there is a 1.2 cm freely mobile cystic subcutaneous nodule most consistent with and epidermal  inclusion cyst   - On the distal left forearm there is a 4 mm hyperkeratotic papule with central excortiation  -No other lesions of concern on areas examined.     Impression/Plan:  1. Prurigo nodularis, left forearm  - Cryotherapy procedure note: After verbal consent and discussion of risks and benefits including but no limited to dyspigmentation/scar, blister, and pain, 1 lesion was treated with 1-2mm freeze border for 2 cycles with liquid nitrogen. Post cryotherapy instructions were provided.    2. Seborrheic keratosis, non irritated  - Discussed benign nature of lesions.  - No further intervention required. Patient to report changes.    3. Epidermal inclusion cyst, left thigh  - We will clinically monitor this area.    CC Referred Self, MD  No address on file on close of this encounter.    Follow-up in 1 year, earlier for new or changing lesions.        staffed the patient.    Staff Involved:  Resident(Jacey Harper MD PGY-3 Family Medicine)/Staff    Staff Physician Comments:   I saw and evaluated the patient with the resident and I agree with the assessment and plan.  I was present for the entire minor procedure and examination. I have made edits if needed.    Foster De Guzman MD  Staff Dermatologist and Dermatopathologist  , Department of Dermatology

## 2020-11-10 NOTE — PATIENT INSTRUCTIONS
Cryotherapy Instructions    For the areas treated with liquid nitrogen (cryotherapy or freezing) today, they are expected to get pink, puffy, and perhaps even blister. The area should then crust up and fall off and the goal is to have nice new skin underneath. There is nothing special that you need to do for these areas. You can wash them as you do normal skin.     Sometimes a blister develops; if a blister does develop do NOT pop it. However, if it breaks open on its own, be sure to wash it with soap and water daily and put plain vasaline or petroleum jelly and a bandaid on it until the skin is healed.     Please call the clinic if you have any questions or concerns.

## 2020-11-10 NOTE — PROGRESS NOTES
[facility] -  Case Progress Note      Client Name: Jerad Ross  YOB: 1960  MRN:  [id number]  Treating Provider: Julita Wisdom LP  Type of Session: Individual  Present in Session: client  Number of Minutes: 55    Start time:1:30 pm  End time: 2:25 pm    Telemedicine Visit: The patient's condition can be safely assessed and treated via synchronous audio and visual telemedicine encounter.      Reason for Telemedicine Visit: Covid-19    Originating Site (Patient Location): Patient's home    Distant Site (Provider Location): Provider Remote Setting    Consent:  The patient/guardian has verbally consented to: the potential risks and benefits of telemedicine (video visit) versus in person care; bill my insurance or make self-payment for services provided; and responsibility for payment of non-covered services.     Mode of Communication:  Video Conference via [employer], doxy    As the provider I attest to compliance with applicable laws and regulations related to telemedicine.        Date of Service:11/09/20     Health Maintenance Summary - Mental Health Treatment Plan       Status Date      MENTAL HEALTH TX PLAN Overdue 10/21/2020      Done 11/21/2019 [facility] MENTAL HEALTH TX PLAN SCAN        Current Symptoms/Status:  Depression, loneliness, anxiety, thoughts and urges to act out sexually, boundary violation with fantasy and pornography, but none in the last few days, distress about relationship, low energy, anxiety and stress about recent heart bypass surgery and recovery, depression, low energy,onflict and tension with his wife due to his past sexual behavior and the impact on the relationship,  financial distress.    Progress Toward Treatment Goals:   Client reported progress increasing frequency of contact with support system.    Intervention: Modality and Description:  Provided support and feedback. Used CBT to process thoughts and urges, depression, and health issues.  Client  shared having some sense of increase in depression.  Client shared feeling some loneliness and sadness as well.  He shared not really being able to see where he is going with his life.  We talked about how he has history of illness as also led to feeling grief and loss.  Client shared again the feelings of loss with talking to the  and what this means for him with Protestant and his wife.  He shared he is continued to have some boundary violations and it continues to be when he is feeling down and lonely.  He shared alternate thinking about other images is not helpful.  He shared he did talk with his doctor and started naltrexone and is hoping that will help.  He is also going to do a more intensive 12-step group process to help him get and maintain abstinence.    response to Intervention:  Client reported gaining insight and validation.    Assignment:  Intervene on urges at bedtime.    Interactive Complexity:  There are four specific communication difficulties that complicate the work of the primary psychiatric procedure.  Interactive complexity (+[id number]) may be reported when at least one of these difficulties is present.    Communication difficulties present during current the psychiatric procedure include:  1. None.    Diagnosis:  Encounter Diagnoses   Name Primary?     Moderate episode of recurrent major depressive disorder (H) Yes     Other Specified Disruptive, Impulse-Control, and Conduct Disorder          Plan / Need for Future Services:  Return for therapy in 2 weeks.      Julita Wisdom Psyd,  LP

## 2020-11-10 NOTE — PROGRESS NOTES
[facility] Dermatology Note      Dermatology Problem List:    1.History of kidney transplant 10/6/93  - Immunosuppression: prednisone 5mg, mycophenolate 750 mg BID (previously on cyclosporine, imuran)  2. History of BCC  - Right axilla s/p MMS 4/9/12  3. History of SCC  - Central chest s/p excision 2009  - Unspecified location s/p excision 1999  3. Seborrheic and verrucous keratoses   - Monitor  4. Filiform warts   - left lateral eyelid, left alteral canthus, left temple s/p cryo 3/15/17  5. Acrochordons   - L anterior neck s/p cryo 10/8/19  6. L thigh epidermal inclusion cyst   - Monitor  1.Prurigo nodularis, left forearm  - s/p cryotherapy 11/10/20    Encounter Date: Nov 10, 2020    CC:   Chief Complaint   Patient presents with     Skin Check     Jerad is here today for a skin check- Jerad notes an area of concern on his forearm.        History of Present Illness:  Mr. Jerad Ross is a 60 year old male with past medical history significant for kidney tranplant in 1993 who presents as a follow-up for annual skin check. The patient was last seen on 10/8/2019 when he underwent cryotherapy of AKs on the left shoulder, left arm, left hand as well as cryotherapy of two irritated acrochordons on the left anterior neck.     Today, he is doing well. He has one lesion of concern on his left forearm. This spot has been present for the past few months and is intermittently itchy. He has picked at it and caused bleeding before. It does seem to be getting a little smaller in size. It is not painful to touch.     He has been using Cetaphil moisturizer about four times per week. He has not had fever, chills, shortness of breath, or other acute symptoms.     Past Medical History:   Patient Active Problem List   Diagnosis     BCC (basal cell carcinoma), trunk     JULIANE (obstructive sleep apnea)     HTN, kidney transplant related     Coronary artery disease involving native coronary artery of native heart  without angina pectoris     NSTEMI (non-ST elevated myocardial infarction) (H)     Depression     Diverticulosis     Hemorrhoids     Kidney replaced by transplant     Basal cell carcinoma     Squamous cell carcinoma     Dyslipidemia     CRP elevated     Glaucoma     AION (acute ischemic optic neuropathy)     Paracentral scotoma     Hip pain     Inflamed seborrheic keratosis     Intertrigo     Chronic hepatitis B (H)     Immunosuppression (H)     Hypogonadism in male     GERD (gastroesophageal reflux disease)     Aftercare following organ transplant     Acute midline low back pain without sciatica     Septic bursitis     Impaired mobility     Infection of lumbar spine (H)     Past Medical History:   Diagnosis Date     AION (acute ischemic optic neuropathy)      Anemia in chronic renal disease      Avascular necrosis of bones of both hips (H)     s/p bilateral hip replacements     Basal cell carcinoma      Chronic hepatitis B (H)      Clostridium difficile colitis      Coronary artery disease involving native coronary artery of native heart without angina pectoris 6/17/2014    Coronary angiogram 6/17/14: Severe distal 3-vessel disease involving small vessels, not amenable to PCI or CABG.     CRP elevated      Depression      Diverticulosis      Dyslipidemia      FSGS (focal segmental glomerulosclerosis)      Gastric ulcer with hemorrhage 2/12/12     GERD (gastroesophageal reflux disease)      Glaucoma     OHTN     Hemorrhoids      Hypertension secondary to other renal disorders      Hypogonadism in male      Immunosuppressed status (H)      Kidney replaced by transplant     focal glomerulosclerosis      NSTEMI (non-ST elevated myocardial infarction) (H) 6/17/2014     JULIANE (obstructive sleep apnea)     Doesn't use CPAP     Paracentral scotoma     LE     Secondary renal hyperparathyroidism (H)      Squamous cell carcinoma      Past Surgical History:   Procedure Laterality Date     APPENDECTOMY       Cardiac Bypass  surgery  06/08/2020    [facility]      CATARACT IOL, RT/LT  4/19/2000    RE     CATARACT IOL, RT/LT  6/1/2000    LE     COLECTOMY SUBTOTAL  1983    10 cm, diverticulitis     COLONOSCOPY  2/13/2012    Procedure:COLONOSCOPY; Surgeon:SLOAN GALLARDO; Location: GI     COLONOSCOPY N/A 1/22/2020    Procedure: Colonoscopy, With Polypectomy And Biopsy;  Surgeon: Aston Kiran MD;  Location:  GI     ESOPHAGOSCOPY, GASTROSCOPY, DUODENOSCOPY (EGD), COMBINED  2/13/2012    Procedure:COMBINED ESOPHAGOSCOPY, GASTROSCOPY, DUODENOSCOPY (EGD); Surgeon:SLOAN GALLARDO; Location: GI     EXTRACAPSULAR CATARACT EXTRATION WITH INTRAOCULAR LENS IMPLANT  4-20-10, 6-1-10    Rt, Lt     HERNIA REPAIR  1995    Lt inguinal     HIP SURGERY      1981, bilat MITZI, revised 2001, 2005     KIDNEY SURGERY  1978 and 1993    transplant     KNEE SURGERY  1983, 1987    bilat TKA     MOHS MICROGRAPHIC PROCEDURE       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, auxiliary spleen     TONSILLECTOMY         Social History:  Patient reports that he quit smoking about 32 years ago. He has a 9.00 pack-year smoking history. He has quit using smokeless tobacco. He reports current alcohol use. He reports that he does not use drugs.    Family History:  Family History   Problem Relation Age of Onset     Cardiovascular Father         AI with valve repair     Hypertension Father      Kidney Disease Father      Cancer Paternal Grandmother         ovarian ca     Cerebrovascular Disease Paternal Grandfather      Cerebrovascular Disease Maternal Grandfather      Kidney Disease Paternal Aunt      Skin Cancer No family hx of      Glaucoma No family hx of      Macular Degeneration No family hx of      Amblyopia No family hx of      Melanoma No family hx of        Medications:  Current Outpatient Medications   Medication Sig Dispense Refill     acetaminophen (TYLENOL) 325 MG tablet Take 1-2 tablets by mouth every 6 hours as needed.       albuterol  (PROAIR HFA) 108 (90 Base) MCG/ACT inhaler Inhale 2 puffs into the lungs every 6 hours as needed for shortness of breath / dyspnea or wheezing 1 Inhaler 2     aspirin 81 MG tablet Take 81 mg by mouth daily        BETA BLOCKER NOT PRESCRIBED, INTENTIONAL, Beta Blocker not prescribed intentionally due to Bradycardia < 50 bpm without beta blocker therapy  0     budesonide (PULMICORT FLEXHALER) 90 MCG/ACT inhaler Inhale 2 puffs into the lungs 2 times daily 1 each 11     calcium citrate-vitamin D (CITRACAL) 315-200 MG-UNIT TABS Take 1 tablet by mouth daily.       clopidogrel (PLAVIX) 75 MG tablet Take 1 tablet (75 mg) by mouth daily 90 tablet 3     entecavir (BARACLUDE) 0.5 MG tablet Take 1 tablet (0.5 mg) by mouth daily 90 tablet 3     FLUoxetine (PROZAC) 10 MG capsule Take 1 capsule (10 mg) by mouth daily With 40mg daily for a total daily dose of 50mg 90 capsule 0     FLUoxetine (PROZAC) 40 MG capsule Take 1 capsule (40 mg) by mouth daily 90 capsule 0     fluticasone-salmeterol (ADVAIR) 250-50 MCG/DOSE inhaler Inhale 1 puff into the lungs every 12 hours 60 Inhaler 11     latanoprost (XALATAN) 0.005 % ophthalmic solution Place 1 drop into both eyes At Bedtime 2.5 mL 4     metoprolol tartrate (LOPRESSOR) 25 MG tablet TAKE 1/2 TABLET BY MOUTH TWICE DAILY       Multiple Vitamins-Iron (ONE DAILY MULTIVITAMIN/IRON) TABS Take 1 tablet by mouth daily       mycophenolate (GENERIC EQUIVALENT) 250 MG capsule Take 3 capsules (750 mg) by mouth 2 times daily 180 capsule 1     naltrexone (DEPADE/REVIA) 50 MG tablet Take 1 tablet (50 mg) by mouth daily 30 tablet 2     naltrexone (DEPADE/REVIA) 50 MG tablet Take 1 tablet (50 mg) by mouth daily 30 tablet 2     nitroGLYcerin (NITROSTAT) 0.4 MG sublingual tablet Place 0.4 mg under the tongue       Omega-3 Fatty Acids (OMEGA 3 PO) Take 1 capsule by mouth daily Dose unknown       pantoprazole (PROTONIX) 40 MG EC tablet Take 1 tablet (40 mg) by mouth daily 90 tablet 2     polyethylene glycol  (MIRALAX) 17 g packet Take 17 g by mouth       predniSONE (DELTASONE) 5 MG tablet Take 1 tablet (5 mg) by mouth daily 90 tablet 3     rosuvastatin (CRESTOR) 20 MG tablet TAKE 1 TABLET BY MOUTH EVERY DAY       isosorbide mononitrate (IMDUR) 30 MG 24 hr tablet Take 0.5 tablets (15 mg) by mouth daily 45 tablet 3        Allergies   Allergen Reactions     Penicillins Shortness Of Breath and Hives     Keflex [Cephalexin Hcl] Other (See Comments)     Pt could not recall reaction     Tetracycline Other (See Comments)     Patient could not recall reaction     Sulfa Drugs Rash     Review of Systems:  -Skin Establ Pt: The patient denies any new rash, pruritus, or lesions that are symptomatic, changing or bleeding, except as per HPI.  -Constitutional: Otherwise feeling well today, in usual state of health.  -HEENT: Patient denies nonhealing oral sores.  -Skin: As above in HPI. No additional skin concerns.    Physical exam:  Vitals: There were no vitals taken for this visit.  GEN: This is a well developed, well-nourished male in no acute distress, in a pleasant mood.    SKIN: Full skin, which includes the head/face, both arms, chest, back, abdomen,both legs, genitalia and/or groin buttocks, digits and/or nails, was examined.  -Decker skin type: II  -There are yellow oily papules with central umbilication located on the forehead and neck.  -There are waxy stuck on tan to brown papules diffusely across his face, chest, abdomen, back, arms, and legs that are too numerous to count  -Within the superior end of the TKA scar on L leg, there is a 1.2 cm freely mobile cystic subcutaneous nodule most consistent with and epidermal inclusion cyst   - On the distal left forearm there is a 4 mm hyperkeratotic papule with central excortiation  -No other lesions of concern on areas examined.     Impression/Plan:  1. Prurigo nodularis, left forearm  - Cryotherapy procedure note: After verbal consent and discussion of risks and benefits  including but no limited to dyspigmentation/scar, blister, and pain, 1 lesion was treated with 1-2mm freeze border for 2 cycles with liquid nitrogen. Post cryotherapy instructions were provided.    2. Seborrheic keratosis, non irritated  - Discussed benign nature of lesions.  - No further intervention required. Patient to report changes.    3. Epidermal inclusion cyst, left thigh  - We will clinically monitor this area.    CC Referred Self, MD  No address on file on close of this encounter.    Follow-up in 1 year, earlier for new or changing lesions.        staffed the patient.    Staff Involved:  Resident(Jacey Harper MD PGY-3 Family Medicine)/Staff    Staff Physician Comments:   I saw and evaluated the patient with the resident and I agree with the assessment and plan.  I was present for the entire minor procedure and examination. I have made edits if needed.    Foster De Guzman MD  Staff Dermatologist and Dermatopathologist  , Department of Dermatology

## 2020-11-23 ENCOUNTER — VIRTUAL VISIT (OUTPATIENT)
Dept: PSYCHOLOGY | Facility: CLINIC | Age: 60
End: 2020-11-23
Payer: COMMERCIAL

## 2020-11-23 DIAGNOSIS — F33.0 MILD EPISODE OF RECURRENT MAJOR DEPRESSIVE DISORDER (H): Primary | ICD-10-CM

## 2020-11-23 DIAGNOSIS — F91.8 CONDUCT DISORDER, UNDIFFERENTIATED TYPE: ICD-10-CM

## 2020-11-23 PROCEDURE — 90837 PSYTX W PT 60 MINUTES: CPT | Mod: 95 | Performed by: PSYCHOLOGIST

## 2020-11-23 PROCEDURE — 99207 PR NO CHARGE LOS: CPT | Performed by: PSYCHOLOGIST

## 2020-11-27 ENCOUNTER — COMMUNICATION - HEALTHEAST (OUTPATIENT)
Dept: GERIATRICS | Facility: CLINIC | Age: 60
End: 2020-11-27

## 2020-11-27 DIAGNOSIS — Z95.1 S/P CABG (CORONARY ARTERY BYPASS GRAFT): ICD-10-CM
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
## 2020-12-01 ENCOUNTER — COMMUNICATION - HEALTHEAST (OUTPATIENT)
Dept: CARDIOLOGY | Facility: CLINIC | Age: 60
End: 2020-12-01

## 2020-12-01 DIAGNOSIS — Z95.1 S/P CABG (CORONARY ARTERY BYPASS GRAFT): ICD-10-CM

## 2020-12-02 ENCOUNTER — VIRTUAL VISIT (OUTPATIENT)
Dept: PSYCHOLOGY | Facility: CLINIC | Age: 60
End: 2020-12-02
Payer: COMMERCIAL

## 2020-12-02 DIAGNOSIS — F91.8 CONDUCT DISORDER, UNDIFFERENTIATED TYPE: ICD-10-CM

## 2020-12-02 DIAGNOSIS — F33.0 MILD EPISODE OF RECURRENT MAJOR DEPRESSIVE DISORDER (H): Primary | ICD-10-CM

## 2020-12-02 PROCEDURE — 90834 PSYTX W PT 45 MINUTES: CPT | Mod: 95 | Performed by: PSYCHOLOGIST
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
## 2020-12-23 ENCOUNTER — OFFICE VISIT - HEALTHEAST (OUTPATIENT)
Dept: CARDIOLOGY | Facility: CLINIC | Age: 60
End: 2020-12-23

## 2020-12-23 DIAGNOSIS — N18.6 END STAGE RENAL DISEASE (H): ICD-10-CM

## 2020-12-23 DIAGNOSIS — I10 ESSENTIAL HYPERTENSION: ICD-10-CM

## 2020-12-23 DIAGNOSIS — Z95.1 S/P CABG (CORONARY ARTERY BYPASS GRAFT): ICD-10-CM

## 2020-12-23 DIAGNOSIS — E78.00 PURE HYPERCHOLESTEROLEMIA: ICD-10-CM

## 2020-12-23 DIAGNOSIS — I25.10 CORONARY ARTERY DISEASE INVOLVING NATIVE CORONARY ARTERY OF NATIVE HEART WITHOUT ANGINA PECTORIS: ICD-10-CM

## 2020-12-24 DIAGNOSIS — Z94.0 KIDNEY TRANSPLANTED: Primary | ICD-10-CM

## 2020-12-24 RX ORDER — MYCOPHENOLATE MOFETIL 250 MG/1
750 CAPSULE ORAL 2 TIMES DAILY
Qty: 180 CAPSULE | Refills: 11 | Status: SHIPPED | OUTPATIENT
Start: 2020-12-24 | End: 2020-12-29

## 2020-12-29 DIAGNOSIS — Z94.0 KIDNEY TRANSPLANTED: Primary | ICD-10-CM

## 2020-12-29 RX ORDER — MYCOPHENOLATE MOFETIL 250 MG/1
750 CAPSULE ORAL 2 TIMES DAILY
Qty: 540 CAPSULE | Refills: 3 | Status: SHIPPED | OUTPATIENT
Start: 2020-12-29 | End: 2022-01-26

## 2021-01-05 DIAGNOSIS — F33.41 RECURRENT MAJOR DEPRESSIVE DISORDER, IN PARTIAL REMISSION (H): ICD-10-CM

## 2021-01-05 NOTE — TELEPHONE ENCOUNTER
Last Seen 10/08/2020    RTC 12 weeks    Cancel 0  No-Show 0    Next Appt 01/11/2021 at 1200    Incoming Refill From [facility] via Fax    Medication Requested Fluoxetine 40 mg Capsules    Directions Take 1 Capsule by mouth daily    Qty 90    Last Refill 10/15/2020 Qty 90 with no refills     Medication Requested Fluoxetine 10 mg Capsules    Directions Take 1 Capsule by mouth Daily with one 40 mg Tablet Daily for a total daily dose of 50 mg.    Qty 90    Last Refill 10/15/2020 Qty 90 with no refills       Medication Refill Pended Per Refill Protocol and Routed to the Provider.

## 2021-01-06 RX ORDER — FLUOXETINE 40 MG/1
40 CAPSULE ORAL DAILY
Qty: 90 CAPSULE | Refills: 0 | Status: SHIPPED | OUTPATIENT
Start: 2021-01-06 | End: 2021-01-11

## 2021-01-06 RX ORDER — FLUOXETINE 10 MG/1
10 CAPSULE ORAL DAILY
Qty: 90 CAPSULE | Refills: 0 | Status: SHIPPED | OUTPATIENT
Start: 2021-01-06 | End: 2021-01-11

## 2021-01-11 ENCOUNTER — VIRTUAL VISIT (OUTPATIENT)
Dept: PSYCHIATRY | Facility: CLINIC | Age: 61
End: 2021-01-11
Attending: NURSE PRACTITIONER
Payer: MEDICARE

## 2021-01-11 DIAGNOSIS — F33.41 RECURRENT MAJOR DEPRESSIVE DISORDER, IN PARTIAL REMISSION (H): ICD-10-CM

## 2021-01-11 PROCEDURE — 99212 OFFICE O/P EST SF 10 MIN: CPT | Mod: 95 | Performed by: NURSE PRACTITIONER

## 2021-01-11 RX ORDER — FLUOXETINE 10 MG/1
10 CAPSULE ORAL DAILY
Qty: 90 CAPSULE | Refills: 0 | Status: SHIPPED | OUTPATIENT
Start: 2021-01-11 | End: 2021-04-05

## 2021-01-11 RX ORDER — FLUOXETINE 40 MG/1
40 CAPSULE ORAL DAILY
Qty: 90 CAPSULE | Refills: 0 | Status: SHIPPED | OUTPATIENT
Start: 2021-01-11 | End: 2021-04-05

## 2021-01-11 RX ORDER — NALTREXONE HYDROCHLORIDE 50 MG/1
50 TABLET, FILM COATED ORAL DAILY
Qty: 90 TABLET | Refills: 0 | Status: SHIPPED | OUTPATIENT
Start: 2021-01-11 | End: 2021-04-05
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
## 2021-01-15 ENCOUNTER — HEALTH MAINTENANCE LETTER (OUTPATIENT)
Age: 61
End: 2021-01-15

## 2021-02-01 ENCOUNTER — VIRTUAL VISIT (OUTPATIENT)
Dept: PSYCHOLOGY | Facility: CLINIC | Age: 61
End: 2021-02-01
Payer: COMMERCIAL

## 2021-02-01 DIAGNOSIS — F33.41 RECURRENT MAJOR DEPRESSIVE DISORDER, IN PARTIAL REMISSION (H): ICD-10-CM

## 2021-02-01 DIAGNOSIS — F91.8 CONDUCT DISORDER, UNDIFFERENTIATED TYPE: Primary | ICD-10-CM

## 2021-02-01 PROCEDURE — 90837 PSYTX W PT 60 MINUTES: CPT | Mod: 95 | Performed by: PSYCHOLOGIST

## 2021-02-01 NOTE — PROGRESS NOTES
[facility] -  Case Progress Note      Client Name: Jerad Ross  YOB: 1960  MRN:  [id number]  Treating Provider: Julita Wisdom LP  Type of Session: Individual  Present in Session: client  Number of Minutes: 45    Start time:4:15 pm  End time: 5:00 pm    Telemedicine Visit: The patient's condition can be safely assessed and treated via synchronous audio and visual telemedicine encounter.      Reason for Telemedicine Visit: Covid-19    Originating Site (Patient Location): Patient's home    Distant Site (Provider Location): Provider Remote Setting    Consent:  The patient/guardian has verbally consented to: the potential risks and benefits of telemedicine (video visit) versus in person care; bill my insurance or make self-payment for services provided; and responsibility for payment of non-covered services.     Mode of Communication:  Video Conference via [employer], doxy could not connect by video, used phone.    As the provider I attest to compliance with applicable laws and regulations related to telemedicine.        Date of Service:2/01/21 Need to do updated diagnostic and tx plan next session.    Health Maintenance Summary - Mental Health Treatment Plan       Status Date      MENTAL HEALTH TX PLAN Overdue 10/21/2020      Done 11/21/2019 HIM MENTAL HEALTH TX PLAN SCAN        Current Symptoms/Status:  He had a few days of boundary violations after a couple of months of maintaining abstinence, mild depression, anxiety, worry, loneliness, lack of motivation, low energy, anxiety, thoughts and urges to act out sexually, but none in the last few days, distress about relationship, ,conflict and tension with his wife due to his past sexual behavior and the impact on the relationship,  financial distress.    Progress Toward Treatment Goals:   Client reported progress with no boundary violations in the past 2 weeks.    Intervention: Modality and Description:  Provided support and  feedback. Used CBT to process thoughts and urges, depression, and health issues.  Client shared he was doing well with maintaining boundaries for 2 months.  He shared them when his medication was getting a little he called to them but did not realize that he had not received it.  He was off the naltrexone for a short period of time and noticed that her urges and sexual behavior increased during that time.  He said since he has been back on the medication he has not had any longer evaluations and his urges are down.  He also processed having some interaction with his wife.  He had increased interaction with her because she had COVID-19 and was trying to be supportive and help her.  He shared asking her the question about if she sees reconciliation as a possibility in the relationship.  She shared with him that based on how things are if she does not see that in the near future.  She shared that there are a number of things that he would need to do.  He offered to sit down and doing amends with her and she shared that she does not think that would be helpful.  She did say that she would consider it however.  Discussed relationship dynamic of how anxious she felt before asking her question.  Also discussed how asking the question gives the power to her.  We talked about some of the history and their relationship with some of the dynamics and how those have impacted him.  Discussed staying focused right now on his recovery maintaining abstinence and following his program.    Response to Intervention:   Client reported gaining insight and validation.    Assignment:  Work on relationship history.    Interactive Complexity:  There are four specific communication difficulties that complicate the work of the primary psychiatric procedure.  Interactive complexity (+83231) may be reported when at least one of these difficulties is present.    Communication difficulties present during current the psychiatric procedure  include:  1. None.    Diagnosis:  Encounter Diagnoses   Name Primary?     Recurrent major depressive disorder, in partial remission (H)      Other Specified Disruptive, Impulse-Control, and Conduct Disorder Yes         Plan / Need for Future Services:  Return for therapy in 2 weeks.      Julita Wisdom Psyd,  LP

## 2021-02-08 LAB — FOUNDATIONONE: NORMAL

## 2021-02-16 ENCOUNTER — MYC MEDICAL ADVICE (OUTPATIENT)
Dept: INTERNAL MEDICINE | Facility: CLINIC | Age: 61
End: 2021-02-16

## 2021-02-22 ENCOUNTER — VIRTUAL VISIT (OUTPATIENT)
Dept: PSYCHOLOGY | Facility: CLINIC | Age: 61
End: 2021-02-22
Payer: COMMERCIAL

## 2021-02-22 DIAGNOSIS — F91.8 CONDUCT DISORDER, UNDIFFERENTIATED TYPE: Primary | ICD-10-CM

## 2021-02-22 DIAGNOSIS — F33.0 MILD EPISODE OF RECURRENT MAJOR DEPRESSIVE DISORDER (H): ICD-10-CM

## 2021-02-22 PROCEDURE — 90837 PSYTX W PT 60 MINUTES: CPT | Mod: 95 | Performed by: PSYCHOLOGIST

## 2021-02-22 NOTE — PROGRESS NOTES
[facility] -  Case Progress Note      Client Name: Jerad Ross  YOB: 1960  MRN:  [id number]  Treating Provider: Julita Wisdom LP  Type of Session: Individual  Present in Session: client  Number of Minutes: 45    Start time:10:00 am   End time: 10:55am     Telemedicine Visit: The patient's condition can be safely assessed and treated via synchronous audio and visual telemedicine encounter.      Reason for Telemedicine Visit: Covid-19    Originating Site (Patient Location): Patient's home    Distant Site (Provider Location): Provider Remote Setting    Consent:  The patient/guardian has verbally consented to: the potential risks and benefits of telemedicine (video visit) versus in person care; bill my insurance or make self-payment for services provided; and responsibility for payment of non-covered services.     Mode of Communication:  Video Conference via [facility], used Doxy    As the provider I attest to compliance with applicable laws and regulations related to telemedicine.        Date of Service:2/22/21   Therapist and patient/guardian/family reviewed the Treatment Plan and goals, agree the plan remains current, accurate, and there are no additions or changes.      Health Maintenance Summary - Mental Health Treatment Plan       Status Date      MENTAL HEALTH TX PLAN Next Due 1/22/2022      Done 2/22/2021 [facility] MENTAL HEALTH TX PLAN SCAN     Done 11/21/2019 HIM MENTAL HEALTH TX PLAN SCAN        Current Symptoms/Status:  Some thoughts and urges to act out sexually, increase in urges more at bedtime,  mild depression, anxiety, worry, loneliness, lack of motivation, low energy, anxiety,  distress about relationship, ,conflict and tension with his wife due to his past sexual behavior and the impact on the relationship,  financial distress.    Progress Toward Treatment Goals:   Client reported progress with no boundary violations.    Intervention: Modality and  Description:  Provided support and feedback. Used CBT to process thoughts and urges, depression, and health issues.  Client shared he has been maintaining his boundaries again.  He shared he has been going to multiple meetings and talking with his sponsors.  We talked more about his addiction and some of the risks and triggers for him.  We discussed how powerful it is because he thinks again over and over again that he can go back to engaging in the behavior.  He also processed and amends letter that he wrote to his wife.  In this letter he really focused on owning his characteristics and behaviors and the impact they had negatively on her in the relationship.  Validated that he did a good job with this information and it seemed much more personal and connected.  He wants to continue working on the letter before he looks that how he might get it to her.  Right now she has stated that she is not sure that she wants a letter.  When client discussed all of this he was much better able to describe different situations and how his behavior contributed to problems.  We again discussed the importance of maintaining abstinence and setting up all the helpful strategies he can do to prevent this.  He has a blocked on his phone and also has thing where it sounds to his sponsor what he is looking at.  He also is engaging in meetings and meditation.        Response to Intervention:   Client reported gaining insight and validation.    Assignment:  Work on relationship history.    Interactive Complexity:  There are four specific communication difficulties that complicate the work of the primary psychiatric procedure.  Interactive complexity (+[id number]) may be reported when at least one of these difficulties is present.    Communication difficulties present during current the psychiatric procedure include:  1. None.    Diagnosis:  Encounter Diagnoses   Name Primary?     Mild episode of recurrent major depressive disorder (H)       Other Specified Disruptive, Impulse-Control, and Conduct Disorder Yes         Plan / Need for Future Services:  Return for therapy in 2 weeks.      Julita Wisdom Psyd,  LP

## 2021-03-01 ENCOUNTER — COMMUNICATION - HEALTHEAST (OUTPATIENT)
Dept: CARDIOLOGY | Facility: CLINIC | Age: 61
End: 2021-03-01

## 2021-03-01 DIAGNOSIS — Z95.1 S/P CABG (CORONARY ARTERY BYPASS GRAFT): ICD-10-CM

## 2021-03-08 ENCOUNTER — OFFICE VISIT (OUTPATIENT)
Dept: OPHTHALMOLOGY | Facility: CLINIC | Age: 61
End: 2021-03-08
Attending: OPHTHALMOLOGY
Payer: COMMERCIAL

## 2021-03-08 ENCOUNTER — TELEPHONE (OUTPATIENT)
Dept: TRANSPLANT | Facility: CLINIC | Age: 61
End: 2021-03-08

## 2021-03-08 DIAGNOSIS — Z86.69 HISTORY OF ANTERIOR ISCHEMIC OPTIC NEUROPATHY: Primary | ICD-10-CM

## 2021-03-08 DIAGNOSIS — H40.003 GLAUCOMA SUSPECT, BOTH EYES: ICD-10-CM

## 2021-03-08 PROCEDURE — 92081 LIMITED VISUAL FIELD XM: CPT | Performed by: STUDENT IN AN ORGANIZED HEALTH CARE EDUCATION/TRAINING PROGRAM

## 2021-03-08 PROCEDURE — 99214 OFFICE O/P EST MOD 30 MIN: CPT | Mod: GC | Performed by: STUDENT IN AN ORGANIZED HEALTH CARE EDUCATION/TRAINING PROGRAM

## 2021-03-08 PROCEDURE — G0463 HOSPITAL OUTPT CLINIC VISIT: HCPCS

## 2021-03-08 ASSESSMENT — VISUAL ACUITY
OS_CC+: -2
OD_PH_CC+: +1
METHOD: SNELLEN - LINEAR
CORRECTION_TYPE: GLASSES
OD_PH_CC: 20/60
OD_CC: 20/70
OD_CC+: +1
OS_CC: 20/20

## 2021-03-08 ASSESSMENT — TONOMETRY
OD_IOP_MMHG: 14
OS_IOP_MMHG: 14
IOP_METHOD: ICARE

## 2021-03-08 ASSESSMENT — CONF VISUAL FIELD
OS_NORMAL: 1
OD_NORMAL: 1

## 2021-03-08 ASSESSMENT — EXTERNAL EXAM - RIGHT EYE: OD_EXAM: NORMAL

## 2021-03-08 ASSESSMENT — REFRACTION_WEARINGRX
SPECS_TYPE: TRIFOCAL
OS_SPHERE: -0.50
OS_CYLINDER: +2.25
OS_AXIS: 170
OD_SPHERE: BALANCE
OS_ADD: +2.50

## 2021-03-08 ASSESSMENT — EXTERNAL EXAM - LEFT EYE: OS_EXAM: NORMAL

## 2021-03-08 ASSESSMENT — CUP TO DISC RATIO
OD_RATIO: 0.2
OS_RATIO: 0.65

## 2021-03-08 ASSESSMENT — SLIT LAMP EXAM - LIDS
COMMENTS: NORMAL
COMMENTS: NORMAL

## 2021-03-08 NOTE — NURSING NOTE
Chief Complaints and History of Present Illnesses   Patient presents with     Blurred Vision Follow-Up     Chief Complaint(s) and History of Present Illness(es)     Blurred Vision Follow-Up               Comments     Jerad Ross is a 61 year old male with the following diagnoses:   1. Borderline glaucoma with ocular hypertension, bilateral   2. Pseudophakia of both eyes   3. Acute ischemic optic neuropathy of right eye       Vision stable since the last visit.     José ACKERMAN 12:59 PM March 8, 2021

## 2021-03-08 NOTE — PROGRESS NOTES
CC:   OHTN f/u      HPI:  61M glaucoma suspect with a history of NAION right eye presenting for 6 month follow up and 's evaluation.  He takes latanoprost at bedtime in both eyes.  He has not had any changes in his visual acuity in either eye.  No new eye pain/flashes/floaters/field cuts/redness/discharge/diplopia.  He has a long-standing stable central and inferior altitudinal scotoma in the right eye related to prior NAION.  He his mild light sensitivity which improves with sunglasses over the last several years.         Review of systems for the eyes was negative other than the pertinent positives/negatives listed in the HPI.      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 History of anterior ischemic optic neuropathy    2. Glaucoma suspect, both eyes      (Z86.69) History of anterior ischemic optic neuropathy  (primary encounter diagnosis)  Comment: Long-standing NAION.  Stable paracentral and inferior scotoma's from prior; VA stable    Plan:   - Continue to monitor  - Discussed the importance of good BP control    (H40.003) Glaucoma suspect, both eyes  Glaucoma suspect based upon age, IOP, C/D asymmetry.  Right eye poor vision likely due to AION overlay and remains stable.  Left eye visual field stable to improved since 2019 with good IOP control s/p SLT 7/2020 left eye.    - IOP today:  14/14 (improved left eye since SLT 7/2020)  - Tmax:  26/22  - IOP target: Normotension  - Pachy:  624/622  - C/D:  0.2/0.65 pale  - Gonio:  Needs  - Trauma history:  Negative  - VF:  LVC right eye generalized depression (stable), 24-2 left eye high false negatives but overall stable from  2019/2020.  Consider trial of LVC due to FN errors  - RNFL: Right eye diffuse thinning; left eye diffuse thinning (stable)  - FH:  Negative  - Continue latanoprost at bedtime each eye   - Completed 's field form today (qualifies for no restrictions based on left eye field and acuity)    - RTC Glaucoma 6 months IOP, LVC OU  - RTC General for VTD + MRx in 1 year    Patient disposition:   Return in about 6 months (around 9/8/2021) for 6 months Glaucoma with Dr. Franco, consider Vision, Pressure, LVC OU.          Arron Vargas, PGY3  Ophthalmology Resident    Teaching statement:  Complete documentation of historical and exam elements from today's encounter can be found in the full encounter summary report (not reduplicated in this progress note). I personally obtained the chief complaint(s) and history of present illness.  I confirmed and edited as necessary the review of systems, past medical/surgical history, family history, social history, and examination findings as documented by others; and I examined the patient myself. I personally reviewed the relevant tests, images, and reports as documented above.     I formulated and edited as necessary the assessment and plan and discussed the findings and management plan with the patient and family.    Heather Maya MD  Comprehensive Ophthalmology & Ocular Pathology  Department of Ophthalmology and Visual Neurosciences  [email or web address]  Pager [phone]

## 2021-03-09 ENCOUNTER — APPOINTMENT (OUTPATIENT)
Dept: LAB | Facility: CLINIC | Age: 61
End: 2021-03-09
Payer: COMMERCIAL

## 2021-03-09 ENCOUNTER — OFFICE VISIT (OUTPATIENT)
Dept: INTERNAL MEDICINE | Facility: CLINIC | Age: 61
End: 2021-03-09
Payer: COMMERCIAL

## 2021-03-09 VITALS
HEART RATE: 58 BPM | HEIGHT: 67 IN | DIASTOLIC BLOOD PRESSURE: 79 MMHG | BODY MASS INDEX: 26.84 KG/M2 | OXYGEN SATURATION: 95 % | SYSTOLIC BLOOD PRESSURE: 121 MMHG | WEIGHT: 171 LBS

## 2021-03-09 DIAGNOSIS — Z94.0 KIDNEY REPLACED BY TRANSPLANT: ICD-10-CM

## 2021-03-09 DIAGNOSIS — R53.83 OTHER FATIGUE: ICD-10-CM

## 2021-03-09 DIAGNOSIS — M94.9 DISORDER OF BONE AND CARTILAGE: ICD-10-CM

## 2021-03-09 DIAGNOSIS — M89.9 DISORDER OF BONE AND CARTILAGE: ICD-10-CM

## 2021-03-09 DIAGNOSIS — E34.9 HYPOTESTOSTERONEMIA: ICD-10-CM

## 2021-03-09 DIAGNOSIS — E34.9 HYPOTESTOSTERONEMIA: Primary | ICD-10-CM

## 2021-03-09 DIAGNOSIS — Z79.899 ENCOUNTER FOR LONG-TERM CURRENT USE OF MEDICATION: ICD-10-CM

## 2021-03-09 DIAGNOSIS — Z48.298 AFTERCARE FOLLOWING ORGAN TRANSPLANT: ICD-10-CM

## 2021-03-09 LAB
ANION GAP SERPL CALCULATED.3IONS-SCNC: 6 MMOL/L (ref 3–14)
BASOPHILS # BLD AUTO: 0 10E9/L (ref 0–0.2)
BASOPHILS NFR BLD AUTO: 0.3 %
BUN SERPL-MCNC: 16 MG/DL (ref 7–30)
CALCIUM SERPL-MCNC: 9.5 MG/DL (ref 8.5–10.1)
CHLORIDE SERPL-SCNC: 104 MMOL/L (ref 94–109)
CK SERPL-CCNC: 55 U/L (ref 30–300)
CO2 SERPL-SCNC: 28 MMOL/L (ref 20–32)
CREAT SERPL-MCNC: 0.75 MG/DL (ref 0.66–1.25)
CRP SERPL-MCNC: 6.9 MG/L (ref 0–8)
DIFFERENTIAL METHOD BLD: ABNORMAL
EOSINOPHIL # BLD AUTO: 0.1 10E9/L (ref 0–0.7)
EOSINOPHIL NFR BLD AUTO: 1.1 %
ERYTHROCYTE [DISTWIDTH] IN BLOOD BY AUTOMATED COUNT: 15.9 % (ref 10–15)
GFR SERPL CREATININE-BSD FRML MDRD: >90 ML/MIN/{1.73_M2}
GLUCOSE SERPL-MCNC: 107 MG/DL (ref 70–99)
HCT VFR BLD AUTO: 44 % (ref 40–53)
HGB BLD-MCNC: 13.4 G/DL (ref 13.3–17.7)
IMM GRANULOCYTES # BLD: 0 10E9/L (ref 0–0.4)
IMM GRANULOCYTES NFR BLD: 0.3 %
LYMPHOCYTES # BLD AUTO: 0.9 10E9/L (ref 0.8–5.3)
LYMPHOCYTES NFR BLD AUTO: 9.7 %
MCH RBC QN AUTO: 27.6 PG (ref 26.5–33)
MCHC RBC AUTO-ENTMCNC: 30.5 G/DL (ref 31.5–36.5)
MCV RBC AUTO: 91 FL (ref 78–100)
MONOCYTES # BLD AUTO: 0.5 10E9/L (ref 0–1.3)
MONOCYTES NFR BLD AUTO: 5.2 %
NEUTROPHILS # BLD AUTO: 7.8 10E9/L (ref 1.6–8.3)
NEUTROPHILS NFR BLD AUTO: 83.4 %
NRBC # BLD AUTO: 0 10*3/UL
NRBC BLD AUTO-RTO: 0 /100
PLATELET # BLD AUTO: 283 10E9/L (ref 150–450)
POTASSIUM SERPL-SCNC: 4.2 MMOL/L (ref 3.4–5.3)
RBC # BLD AUTO: 4.86 10E12/L (ref 4.4–5.9)
SODIUM SERPL-SCNC: 139 MMOL/L (ref 133–144)
TSH SERPL DL<=0.005 MIU/L-ACNC: 1.17 MU/L (ref 0.4–4)
WBC # BLD AUTO: 9.4 10E9/L (ref 4–11)

## 2021-03-09 PROCEDURE — G0439 PPPS, SUBSEQ VISIT: HCPCS | Performed by: INTERNAL MEDICINE

## 2021-03-09 PROCEDURE — 86140 C-REACTIVE PROTEIN: CPT | Performed by: PATHOLOGY

## 2021-03-09 PROCEDURE — 84403 ASSAY OF TOTAL TESTOSTERONE: CPT | Mod: 90 | Performed by: PATHOLOGY

## 2021-03-09 PROCEDURE — 82306 VITAMIN D 25 HYDROXY: CPT | Mod: 90 | Performed by: PATHOLOGY

## 2021-03-09 PROCEDURE — 82550 ASSAY OF CK (CPK): CPT | Performed by: PATHOLOGY

## 2021-03-09 PROCEDURE — 85025 COMPLETE CBC W/AUTO DIFF WBC: CPT | Performed by: PATHOLOGY

## 2021-03-09 PROCEDURE — 36415 COLL VENOUS BLD VENIPUNCTURE: CPT | Performed by: PATHOLOGY

## 2021-03-09 PROCEDURE — 84443 ASSAY THYROID STIM HORMONE: CPT | Performed by: PATHOLOGY

## 2021-03-09 PROCEDURE — 84270 ASSAY OF SEX HORMONE GLOBUL: CPT | Mod: 90 | Performed by: PATHOLOGY

## 2021-03-09 PROCEDURE — 99000 SPECIMEN HANDLING OFFICE-LAB: CPT | Performed by: PATHOLOGY

## 2021-03-09 PROCEDURE — 80048 BASIC METABOLIC PNL TOTAL CA: CPT | Performed by: PATHOLOGY

## 2021-03-09 ASSESSMENT — PAIN SCALES - GENERAL: PAINLEVEL: NO PAIN (0)

## 2021-03-09 ASSESSMENT — MIFFLIN-ST. JEOR: SCORE: 1549.28

## 2021-03-09 NOTE — TELEPHONE ENCOUNTER
Immunosuppressed patients are at greater risk of transmission of viral, bacterial and fungal infection. Be extra cautious in cleanliness practices between tattoo parlors / artists. They should use new needles and not reuse for risk of Hep C or HIV transmission. We do not recommend it but not forbid it.    RNCC left detailed VM with above information. Asked for a call back with any questions.

## 2021-03-09 NOTE — NURSING NOTE
Chief Complaint   Patient presents with     Medicare Visit     pt here for medicare wellness, follow up on joints and hormone therapy       Jennifer Concepcion CMA, EMT at 11:00 AM on 3/9/2021.

## 2021-03-09 NOTE — PROGRESS NOTES
HPI  61-year-old presents today for several issues.  Recently had increased pain and swelling stiffness in his hands.  This is associated with multiple trigger fingers by his report.  We upped his prednisone for a week and this resulted in significant improvement.  Presently he is feeling less stiffness and no swelling or discomfort however he still has triggering of the left fourth finger.  He also wanted to discuss his low testosterone level.  He feels that he is unable to ride a bike and feels weak with physical activity and exercise he is unable to get off the floor due to weakness.  We discussed the risks and benefits of supplementation and will retest today.  Past Medical History:   Diagnosis Date     AION (acute ischemic optic neuropathy)      Anemia in chronic renal disease      Avascular necrosis of bones of both hips (H)     s/p bilateral hip replacements     Basal cell carcinoma      Chronic hepatitis B (H)      Clostridium difficile colitis      Coronary artery disease involving native coronary artery of native heart without angina pectoris 6/17/2014    Coronary angiogram 6/17/14: Severe distal 3-vessel disease involving small vessels, not amenable to PCI or CABG.     CRP elevated      Depression      Diverticulosis      Dyslipidemia      FSGS (focal segmental glomerulosclerosis)      Gastric ulcer with hemorrhage 2/12/12     GERD (gastroesophageal reflux disease)      Glaucoma     OHTN     Hemorrhoids      Hypertension secondary to other renal disorders      Hypogonadism in male      Immunosuppressed status (H)      Kidney replaced by transplant     focal glomerulosclerosis      NSTEMI (non-ST elevated myocardial infarction) (H) 6/17/2014     JULIANE (obstructive sleep apnea)     Doesn't use CPAP     Paracentral scotoma     LE     Secondary renal hyperparathyroidism (H)      Squamous cell carcinoma      Past Surgical History:   Procedure Laterality Date     APPENDECTOMY       Cardiac Bypass surgery   06/08/2020    [facility]      CATARACT IOL, RT/LT  4/19/2000    RE     CATARACT IOL, RT/LT  6/1/2000    LE     COLECTOMY SUBTOTAL  1983    10 cm, diverticulitis     COLONOSCOPY  2/13/2012    Procedure:COLONOSCOPY; Surgeon:SLOAN GALLARDO; Location: GI     COLONOSCOPY N/A 1/22/2020    Procedure: Colonoscopy, With Polypectomy And Biopsy;  Surgeon: Aston Kiran MD;  Location:  GI     ESOPHAGOSCOPY, GASTROSCOPY, DUODENOSCOPY (EGD), COMBINED  2/13/2012    Procedure:COMBINED ESOPHAGOSCOPY, GASTROSCOPY, DUODENOSCOPY (EGD); Surgeon:SLOAN GALLARDO; Location: GI     EXTRACAPSULAR CATARACT EXTRATION WITH INTRAOCULAR LENS IMPLANT  4-20-10, 6-1-10    Rt, Lt     HERNIA REPAIR  1995    Lt inguinal     HIP SURGERY      1981, bilat MITZI, revised 2001, 2005     KIDNEY SURGERY  1978 and 1993    transplant     KNEE SURGERY  1983, 1987    bilat TKA     MOHS MICROGRAPHIC PROCEDURE       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, auxiliary spleen     TONSILLECTOMY       Family History   Problem Relation Age of Onset     Cardiovascular Father         AI with valve repair     Hypertension Father      Kidney Disease Father      Cancer Paternal Grandmother         ovarian ca     Cerebrovascular Disease Paternal Grandfather      Cerebrovascular Disease Maternal Grandfather      Kidney Disease Paternal Aunt      Skin Cancer No family hx of      Glaucoma No family hx of      Macular Degeneration No family hx of      Amblyopia No family hx of      Melanoma No family hx of      Social History     Socioeconomic History     Marital status:      Spouse name: Not on file     Number of children: 0     Years of education: Not on file     Highest education level: Not on file   Occupational History     Employer: DISABLED     Occupation:      Employer: [employer]   Social Needs     Financial resource strain: Not on file     Food insecurity     Worry: Not on file     Inability: Not on file      "Transportation needs     Medical: Not on file     Non-medical: Not on file   Tobacco Use     Smoking status: Former Smoker     Packs/day: 1.00     Years: 9.00     Pack years: 9.00     Quit date: 1988     Years since quittin.2     Smokeless tobacco: Former User   Substance and Sexual Activity     Alcohol use: Yes     Alcohol/week: 0.0 standard drinks     Comment: rarely 1 drink per month     Drug use: No     Sexual activity: Not on file   Lifestyle     Physical activity     Days per week: Not on file     Minutes per session: Not on file     Stress: Not on file   Relationships     Social connections     Talks on phone: Not on file     Gets together: Not on file     Attends Jehovah's witness service: Not on file     Active member of club or organization: Not on file     Attends meetings of clubs or organizations: Not on file     Relationship status: Not on file     Intimate partner violence     Fear of current or ex partner: Not on file     Emotionally abused: Not on file     Physically abused: Not on file     Forced sexual activity: Not on file   Other Topics Concern     Parent/sibling w/ CABG, MI or angioplasty before 65F 55M? Not Asked      Service Not Asked     Blood Transfusions Not Asked     Caffeine Concern Not Asked     Occupational Exposure Not Asked     Hobby Hazards Not Asked     Sleep Concern Not Asked     Stress Concern Not Asked     Weight Concern Not Asked     Special Diet No     Back Care Not Asked     Exercise Yes     Comment: walks     Bike Helmet Not Asked     Seat Belt Not Asked     Self-Exams Not Asked   Social History Narrative    . Wife is also a kidney transplant recipient.     Works part-time       Exam:  /79 (BP Location: Right arm, Patient Position: Sitting, Cuff Size: Adult Regular)   Pulse 58   Ht 1.702 m (5' 7\")   Wt 77.6 kg (171 lb)   SpO2 95%   BMI 26.78 kg/m    171 lbs 0 oz  Physical Exam   The patient is alert, oriented with a clear sensorium.   Skin shows " multiple seborrheic keratoses  Head is normocephalic and atraumatic.    Neck shows no nodes, thyromegaly or bruits.   Lungs are clear to percussion and auscultation.   Heart shows normal S1 and S2 without murmur or gallop.   Abdomen is soft and nontender without masses or organomegaly.   Extremities show no edema, degenerative changes in the hands with no evidence of active synovitis the right fourth finger is triggering.   Neurologic examination shows cranial nerves II-XII intact.  Reflexes are symmetric.     ASSESSMENT  1 hypotestosterone is him needs follow-up on Testim  2 osteoarthritis of the hands improved  3 trigger finger left fourth finger  4 depression in remission  5 hyperlipidemia  6 history of sleep apnea  7 status post kidney transplant  8 coronary artery disease asymptomatic  9 hypertension well controlled  10 history of pornography fixation    Plan  We discussed the potential risks of testosterone supplementation including increased cardiovascular and cerebrovascular risk, prostate stimulation increased blood count and others.  Were going to recheck today if low we discussed different replacement options and will try on topical skin gel.  We discussed his trigger finger but he does not desire injection at this time.  He will continue to use some additional prednisone on a as needed basis if symptoms recur.    This note was completed using Dragon voice recognition software.      Aston Perry MD  General Internal Medicine  [facility]  [phone]

## 2021-03-09 NOTE — NURSING NOTE
Chief Complaint   Patient presents with     Recheck Medication     pt here to follow up on joint flare up, hormone replacement therapy       Jennifer Concepcion CMA, EMT at 10:55 AM on 3/9/2021.

## 2021-03-09 NOTE — PROGRESS NOTES
Medicare Annual Wellness Visit Previsit note    This 61 year old year old male presents for a  Medicare Wellness Exam.           Medical Care     Have you been to an ER or a hospital in the last year? Yes  What other specialists or organizations are involved in your medical care?  Cardiology, Dermatology, Hepatology, Nephrology, Ophthalmology   Current providers sharing in care for this patient include:  Patient Care Team       Relationship Specialty Notifications Start End    Aston Perry MD PCP - General Internal Medicine  2/14/19     Phone: [phone] Fax: [phone]         [address] [facility] [address]    Viki Rizzo MD MD Ophthalmology  5/8/14     Phone: [phone] Fax: [phone]         [facility] [address] [facility] [address]    Oralia Morse, yD  Psychology  5/8/15     Filemon Levine MD MD Dermapathology  12/7/15     Phone: [phone] Fax: [phone]         [address] [place] [address] [address] [facility] [address]    Foster De Guzman MD MD Dermatology  1/24/17     Phone: [phone] Fax: [phone]         [address] [facility] [address]    Nolan Lovett MD MD Nephrology  6/19/17     Phone: [phone] Fax: [phone]         [address] [facility] [address] [facility] [address] [facility] [address]    Bradly Juarez MD MD Ophthalmology  7/23/18     Phone: [phone] Fax: [phone]         [address] [facility] [address] [facility] [address]    Kenneth Bella OD MD Optometry  8/24/18     Phone: [phone] Fax: [phone]         [address] [facility] [address]    Angeles Sanchez OT Occupational Therapist Occupational Therapy  8/24/18     Phone: [phone]          FLORINA ROSENBERG  [facility] [address] [facility] [address]    Balta Orantes MD MD Cardiology Admissions 11/8/18     Phone: [phone] Fax: [phone]         [address] [facility] [address]    Helder Hunter LPN LPN Cardiology  Admissions 11/8/18     Genia Fraser APRN CNP Nurse Practitioner Nurse Practitioner Psych/Mental Health  12/13/18     Phone: [phone] Fax: [phone]         [facility] [place] [id number]    Aston Perry MD Assigned PCP   6/21/20     Phone: [phone] Fax: [phone]         [address] [place] [id number]    Genia Fraser APRN CNP Assigned Behavioral Health Provider   10/23/20     Phone: [phone] Fax: [phone]         [facility] [address] [address]    Lnia Claros MD Assigned Gastroenterology Provider   10/23/20     Phone: [phone] Fax: [phone]         [address] [address]    Foster De Guzman MD Assigned Surgical Provider   11/15/20     Phone: [phone] Fax: [phone]         [address] [facility] [address]                 Social History     Marital Status:  Who lives in your household? Other, non-related men   Does your home have any of the following safety concerns? Loose rugs in the hallway, no grab bars in the bathroom, no handrails on the stairs or have poorly lit areas?  No  Do you feel threatened or controlled by a partner, ex-partner or anyone in your life? No  Has anyone hurt you physically, for example by pushing, hitting, slapping or kicking you   or forcing you to have sex? No  Do you need help with the phone, transportation, shopping, preparing meals, housework, laundry, medications or managing money? Yes Transportation    Have you noticed any hearing difficulties? No      Risk Behaviors and Healthy Habits     How many servings of fruits and vegetables do you eat a day? 3  How often do you exercise and what do you do? 30 minutes 3X a week  Do you frequently ride without a seatbelt? No  Do you use tobacco?  No  Do you use any other drugs? No       Do you use alcohol? No    Sexual Health     Are you sexually active? No   If yes, with men, women, or both? N/A  If yes, do you more  than one current partner?No  If yes, do you use condoms? Yes  Have you had any sexually transmitted infections? No  Any sexual concerns? No     PHQ-2 Score:     PHQ-2 ( 1999 Pfizer) 3/9/2021 12/23/2019   Q1: Little interest or pleasure in doing things 0 0   Q2: Feeling down, depressed or hopeless 0 0   PHQ-2 Score 0 0   Q1: Little interest or pleasure in doing things - -   Q2: Feeling down, depressed or hopeless - -   PHQ-2 Score - -   Some encounter information is confidential and restricted. Go to Review Flowsheets activity to see all data.     Fall Risk Screening:  FALL RISK ASSESSMENT 3/9/2021   Fallen 2 or more times in the past year? No   Any fall with injury in the past year? No       Naomy Rodarte EMT at 1:04 PM sign on 3/9/2021

## 2021-03-10 LAB — DEPRECATED CALCIDIOL+CALCIFEROL SERPL-MC: 49 UG/L (ref 20–75)

## 2021-03-11 ENCOUNTER — MYC MEDICAL ADVICE (OUTPATIENT)
Dept: INTERNAL MEDICINE | Facility: CLINIC | Age: 61
End: 2021-03-11

## 2021-03-11 LAB
SHBG SERPL-SCNC: 50 NMOL/L (ref 11–80)
TESTOST FREE SERPL-MCNC: 2.36 NG/DL (ref 4.7–24.4)
TESTOST SERPL-MCNC: 165 NG/DL (ref 240–950)

## 2021-03-12 RX ORDER — TESTOSTERONE GEL, 1% 10 MG/G
50 GEL TRANSDERMAL DAILY
Qty: 60 PACKET | Refills: 1 | Status: SHIPPED | OUTPATIENT
Start: 2021-03-12 | End: 2021-09-28

## 2021-03-15 ENCOUNTER — TELEPHONE (OUTPATIENT)
Dept: INTERNAL MEDICINE | Facility: CLINIC | Age: 61
End: 2021-03-15

## 2021-03-15 NOTE — TELEPHONE ENCOUNTER
PA Initiation    Medication: testosterone (ANDROGEL/TESTIM) 50 MG/5GM (1%) gel -   Insurance Company: [employer] - Phone [phone] Fax [phone]  Pharmacy Filling the Rx: [facility] - [place], [place] - [address]  Filling Pharmacy Phone: [phone]  Filling Pharmacy Fax: [phone]  Start Date: 3/15/2021

## 2021-03-15 NOTE — TELEPHONE ENCOUNTER
Prior Authorization Retail Medication Request    Medication/Dose: testosterone (ANDROGEL/TESTIM) 50 MG/5GM (1%) topical gel  ICD code (if different than what is on RX):  Hypotestosteronemia (E34.9)  Previously Tried and Failed:   Rationale:      Insurance Name: [employer]    Insurance ID: [id number]    Pharmacy Information (if different than what is on RX)  Name: [facility] - [place], [place] - [address]  Phone: [phone]

## 2021-03-18 ENCOUNTER — TELEPHONE (OUTPATIENT)
Dept: OPHTHALMOLOGY | Facility: CLINIC | Age: 61
End: 2021-03-18

## 2021-03-18 NOTE — TELEPHONE ENCOUNTER
Prior Authorization Approval    Medication: testosterone (ANDROGEL/TESTIM) 50 MG/5GM (1%) gel -  was approved on 3/16/2021  Effective: 1/1/2021 to 3/15/2022  Reference #:    Approved Dose/Quantity:   Insurance Company: [employer] - Phone [phone] Fax [phone]  Expected CoPay:    [facility] - [place], [place] - [address]  Pharmacy Notified: Yes  Patient Notified: Comment:  Pt picked up

## 2021-03-18 NOTE — TELEPHONE ENCOUNTER
I spoke to Lorin at [facility].  She would like a copy of patient's VF.  I contacted [employer] to mail/fax the VF to the provider

## 2021-03-18 NOTE — TELEPHONE ENCOUNTER
[facility] Call Center    Phone Message    May a detailed message be left on voicemail: yes     Reason for Call: Form or Letter   Type or form/letter needing completion: Needs last Visual Field Exam results- specifically an eye map  Provider: Dr. Vargas  Date form needed: ASAP  Once completed: Fax form to: [phone]      Action Taken: Message routed to:  [facility] EYE    Travel Screening: Not Applicable

## 2021-03-30 ENCOUNTER — VIRTUAL VISIT (OUTPATIENT)
Dept: PSYCHOLOGY | Facility: CLINIC | Age: 61
End: 2021-03-30
Payer: COMMERCIAL

## 2021-03-30 DIAGNOSIS — F33.0 MILD EPISODE OF RECURRENT MAJOR DEPRESSIVE DISORDER (H): Primary | ICD-10-CM

## 2021-03-30 DIAGNOSIS — F91.8 CONDUCT DISORDER, UNDIFFERENTIATED TYPE: ICD-10-CM

## 2021-03-30 PROCEDURE — 90837 PSYTX W PT 60 MINUTES: CPT | Mod: 95 | Performed by: PSYCHOLOGIST

## 2021-03-30 PROCEDURE — 99207 PR NO CHARGE LOS: CPT | Performed by: PSYCHOLOGIST

## 2021-03-30 NOTE — PROGRESS NOTES
[facility] -  Case Progress Note      Client Name: Jerad Ross  YOB: 1960  MRN:  [id number]  Treating Provider: Julita Wisdom LP  Type of Session: Individual  Present in Session: client  Number of Minutes: 53    Start time:3:00 pm  End time: 3:53 pm    Telemedicine Visit: The patient's condition can be safely assessed and treated via synchronous audio and visual telemedicine encounter.      Reason for Telemedicine Visit: Covid-19    Originating Site (Patient Location): Patient's home    Distant Site (Provider Location): Provider Remote Setting    Consent:  The patient/guardian has verbally consented to: the potential risks and benefits of telemedicine (video visit) versus in person care; bill my insurance or make self-payment for services provided; and responsibility for payment of non-covered services.     Mode of Communication:  Video Conference via Iconfinder, used Doxy    As the provider I attest to compliance with applicable laws and regulations related to telemedicine.        Date of Service:3/30/21   Therapist and patient/guardian/family reviewed the Treatment Plan and goals, agree the plan remains current, accurate, and there are no additions or changes.      Health Maintenance Summary - Mental Health Treatment Plan       Status Date      MENTAL HEALTH TX PLAN Next Due 1/22/2022      Done 2/22/2021 [facility] MENTAL HEALTH TX PLAN SCAN     Done 11/21/2019 [facility] MENTAL HEALTH TX PLAN SCAN        Current Symptoms/Status:  Some thoughts and urges to act out sexually, increase in urges more at bedtime,  mild depression, anxiety, worry, loneliness,  anxiety,  distress about relationship, ,conflict and tension with his wife due to his past sexual behavior and the impact on the relationship,  financial distress.    Progress Toward Treatment Goals:   Client reported progress with no boundary violations and being able to intervene on urges.    Intervention: Modality and  Description:  Provided support and feedback. Used CBT to process thoughts and urges, depression, and health issues.  Client shared that he has been able to maintain his boundaries and he feels very good about it.  He shared he is been using all of his support system to get this done.  He still has increased urges at night and he even will call someone and leave them a message even though they will not answer.  He shared that he has not had much contact with his wife and he has an acceptance process of that dynamic.  He shared about interaction recently with his siblings and mother and trying to be supportive of what is going on with them.  He shared that he has more energy right now.  Recently when he went to his physician they talked about his testosterone which the level was quite low and so his physician prescribed topical testosterone and client shares he has been feeling better since he has been taking it.  Client shared about some of his interests and things he would like to do.  We talked about the contrast to past months where he had very little energy and motivation and can even imagine going out for a walk.  Encouraged client to continue to focus on maintaining boundaries because that seems to be the key to improved functioning and mood.      Response to Intervention:   Client reported gaining insight and validation.    Assignment:  Work on relationship history.    Interactive Complexity:  There are four specific communication difficulties that complicate the work of the primary psychiatric procedure.  Interactive complexity (+[id number]) may be reported when at least one of these difficulties is present.    Communication difficulties present during current the psychiatric procedure include:  1. None.    Diagnosis:  Encounter Diagnoses   Name Primary?     Mild episode of recurrent major depressive disorder (H) Yes     Other Specified Disruptive, Impulse-Control, and Conduct Disorder          Plan / Need for  Future Services:  Return for therapy in 2 weeks.      Julita Wisdom Psyd,  LP

## 2021-04-02 ENCOUNTER — TRANSFERRED RECORDS (OUTPATIENT)
Dept: HEALTH INFORMATION MANAGEMENT | Facility: CLINIC | Age: 61
End: 2021-04-02

## 2021-04-05 ENCOUNTER — OFFICE VISIT (OUTPATIENT)
Dept: NEPHROLOGY | Facility: CLINIC | Age: 61
End: 2021-04-05
Attending: INTERNAL MEDICINE
Payer: COMMERCIAL

## 2021-04-05 ENCOUNTER — VIRTUAL VISIT (OUTPATIENT)
Dept: PSYCHIATRY | Facility: CLINIC | Age: 61
End: 2021-04-05
Attending: NURSE PRACTITIONER
Payer: COMMERCIAL

## 2021-04-05 ENCOUNTER — VIRTUAL VISIT (OUTPATIENT)
Dept: PSYCHOLOGY | Facility: CLINIC | Age: 61
End: 2021-04-05
Payer: COMMERCIAL

## 2021-04-05 VITALS
HEART RATE: 53 BPM | TEMPERATURE: 98.1 F | WEIGHT: 168.6 LBS | BODY MASS INDEX: 26.41 KG/M2 | SYSTOLIC BLOOD PRESSURE: 138 MMHG | OXYGEN SATURATION: 96 % | DIASTOLIC BLOOD PRESSURE: 90 MMHG

## 2021-04-05 DIAGNOSIS — F91.8 CONDUCT DISORDER, UNDIFFERENTIATED TYPE: ICD-10-CM

## 2021-04-05 DIAGNOSIS — F33.41 RECURRENT MAJOR DEPRESSIVE DISORDER, IN PARTIAL REMISSION (H): ICD-10-CM

## 2021-04-05 DIAGNOSIS — B16.9 ACUTE VIRAL HEPATITIS B WITHOUT COMA AND WITHOUT DELTA AGENT: Primary | ICD-10-CM

## 2021-04-05 DIAGNOSIS — Z48.298 AFTERCARE FOLLOWING ORGAN TRANSPLANT: ICD-10-CM

## 2021-04-05 DIAGNOSIS — B18.1 VIRAL HEPATITIS B CHRONIC (H): ICD-10-CM

## 2021-04-05 DIAGNOSIS — F33.0 MILD EPISODE OF RECURRENT MAJOR DEPRESSIVE DISORDER (H): Primary | ICD-10-CM

## 2021-04-05 PROCEDURE — 99441 PR PHYSICIAN TELEPHONE EVALUATION 5-10 MIN: CPT | Mod: 95 | Performed by: NURSE PRACTITIONER

## 2021-04-05 PROCEDURE — 90837 PSYTX W PT 60 MINUTES: CPT | Mod: 95 | Performed by: PSYCHOLOGIST

## 2021-04-05 PROCEDURE — 99207 PR NO CHARGE LOS: CPT | Performed by: PSYCHOLOGIST

## 2021-04-05 PROCEDURE — 99214 OFFICE O/P EST MOD 30 MIN: CPT | Performed by: INTERNAL MEDICINE

## 2021-04-05 RX ORDER — FLUOXETINE 10 MG/1
10 CAPSULE ORAL DAILY
Qty: 90 CAPSULE | Refills: 1 | Status: SHIPPED | OUTPATIENT
Start: 2021-04-05 | End: 2021-08-06

## 2021-04-05 RX ORDER — NALTREXONE HYDROCHLORIDE 50 MG/1
50 TABLET, FILM COATED ORAL DAILY
Qty: 90 TABLET | Refills: 1 | Status: SHIPPED | OUTPATIENT
Start: 2021-04-05 | End: 2021-08-06

## 2021-04-05 RX ORDER — FLUOXETINE 40 MG/1
40 CAPSULE ORAL DAILY
Qty: 90 CAPSULE | Refills: 1 | Status: SHIPPED | OUTPATIENT
Start: 2021-04-05 | End: 2021-08-06

## 2021-04-05 ASSESSMENT — PAIN SCALES - GENERAL: PAINLEVEL: NO PAIN (0)
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
## 2021-04-05 NOTE — PROGRESS NOTES
CHRONIC TRANSPLANT NEPHROLOGY VISIT    Assessment & Plan   # DDKT: Stable   - Baseline Cr ~ 0.8-1.1   - Proteinuria: Minimal (0.2-0.5 grams)   - Date DSA Last Checked: Not Known       Latest DSA: Not checked recently due to time from transplant   - BK Viremia: Not checked recently   - Kidney Tx Biopsy: No    # Immunosuppression: Mycophenolate mofetil (goal 1-3.5) and Prednisone (dose 5 mg daily)   - Changes: No      # Hypertension: Controlled; Goal BP: < 130/80   - Changes: No    # Anemia in Chronic Renal Disease: Hgb: Stable        - Iron studies: Not checked recently       # Electrolytes:   - Potassium; level: Normal  - Magnesium; level: Not checked recently  - Bicarbonate; level: Normal  - Sodium; level: Normal    # Chronic Hep B: on Entecavir     # CAD: s/p CABG now asymptomatic     # Kidney Ultrasound: no cysts will update in a few months.     # Skin Cancer Risk:    - Discussed sun protection and recommend regular follow up with Dermatology.    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    Return visit: No follow-ups on file.      Chief Complaint   Mr. Ross is a 61 year old here for routine follow up.    History of Present Illness   Mr. 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   He has been doing well. No new complaints. Sees dermatology regularly but has not been seen by hepatology in awhile. He takes his antiviral  for his chronic Hep B infection.      Recent Hospitalizations:  [x] No [] Yes    New Medical Issues: [x] No [] Yes    Decreased energy: [x] No [] Yes    Chest pain or SOB with exertion:  [x] No [] Yes    Appetite change or weight change: [x] No [] Yes    Nausea, vomiting or diarrhea:  [x] No [] Yes    Fever, sweats or chills: [x] No [] Yes    Leg swelling: [x] No [] Yes      Home BP: Not checked    Review of Systems   A comprehensive review of systems was obtained and negative, except as noted in the HPI or PMH.    Problem List   Patient Active Problem List   Diagnosis     BCC (basal cell carcinoma), trunk     JULIANE (obstructive sleep apnea)     HTN, kidney transplant related     Coronary artery disease involving native coronary artery of native heart without angina pectoris     NSTEMI (non-ST elevated myocardial infarction) (H)     Depression     Diverticulosis     Hemorrhoids     Kidney replaced by transplant     Basal cell carcinoma     Squamous cell carcinoma     Dyslipidemia     CRP elevated     Glaucoma     AION (acute ischemic optic neuropathy)     Paracentral scotoma     Hip pain     Inflamed seborrheic keratosis     Intertrigo     Chronic hepatitis B (H)     Immunosuppression (H)     Hypogonadism in male     GERD (gastroesophageal reflux disease)     Aftercare following organ transplant     Acute midline low back pain without sciatica     Septic bursitis     Impaired mobility     Infection of lumbar spine (H)       Social History   Social History     Tobacco Use     Smoking status: Former Smoker     Packs/day: 1.00     Years: 9.00     Pack years: 9.00     Quit date: 1988     Years since quittin.2     Smokeless tobacco: Former User   Substance Use Topics     Alcohol use: Yes     Alcohol/week: 0.0 standard drinks     Comment: rarely 1 drink per month     Drug use: No       Allergies   Allergies   Allergen Reactions     Penicillins Shortness Of Breath and Hives     Keflex [Cephalexin Hcl] Other (See  Comments)     Pt could not recall reaction     Tetracycline Other (See Comments)     Patient could not recall reaction     Sulfa Drugs Rash       Medications   Current Outpatient Medications   Medication Sig     acetaminophen (TYLENOL) 325 MG tablet Take 1-2 tablets by mouth every 6 hours as needed.     albuterol (PROAIR HFA) 108 (90 Base) MCG/ACT inhaler Inhale 2 puffs into the lungs every 6 hours as needed for shortness of breath / dyspnea or wheezing     aspirin 81 MG tablet Take 81 mg by mouth daily      budesonide (PULMICORT FLEXHALER) 90 MCG/ACT inhaler Inhale 2 puffs into the lungs 2 times daily     calcium citrate-vitamin D (CITRACAL) 315-200 MG-UNIT TABS Take 1 tablet by mouth daily.     entecavir (BARACLUDE) 0.5 MG tablet Take 1 tablet (0.5 mg) by mouth daily     FLUoxetine (PROZAC) 10 MG capsule Take 1 capsule (10 mg) by mouth daily With 40mg daily for a total daily dose of 50mg     FLUoxetine (PROZAC) 40 MG capsule Take 1 capsule (40 mg) by mouth daily     fluticasone-salmeterol (ADVAIR) 250-50 MCG/DOSE inhaler Inhale 1 puff into the lungs every 12 hours     latanoprost (XALATAN) 0.005 % ophthalmic solution Place 1 drop into both eyes At Bedtime     metoprolol tartrate (LOPRESSOR) 25 MG tablet TAKE 1/2 TABLET BY MOUTH TWICE DAILY     Multiple Vitamins-Iron (ONE DAILY MULTIVITAMIN/IRON) TABS Take 1 tablet by mouth daily     mycophenolate (GENERIC EQUIVALENT) 250 MG capsule Take 3 capsules (750 mg) by mouth 2 times daily     nitroGLYcerin (NITROSTAT) 0.4 MG sublingual tablet Place 0.4 mg under the tongue     Omega-3 Fatty Acids (OMEGA 3 PO) Take 1 capsule by mouth daily Dose unknown     pantoprazole (PROTONIX) 40 MG EC tablet Take 1 tablet (40 mg) by mouth daily     polyethylene glycol (MIRALAX) 17 g packet Take 17 g by mouth     predniSONE (DELTASONE) 5 MG tablet Take 1 tablet (5 mg) by mouth daily     rosuvastatin (CRESTOR) 20 MG tablet TAKE 1 TABLET BY MOUTH EVERY DAY     testosterone (ANDROGEL/TESTIM)  50 MG/5GM (1%) topical gel Place 1 packet (50 mg of testosterone) onto the skin daily Apply to the clean, dry intact skin of the shoulders, upper arms or abdomen.     BETA BLOCKER NOT PRESCRIBED, INTENTIONAL, Beta Blocker not prescribed intentionally due to Bradycardia < 50 bpm without beta blocker therapy     naltrexone (DEPADE/REVIA) 50 MG tablet Take 1 tablet (50 mg) by mouth daily     predniSONE (DELTASONE) 20 MG tablet Take 1 tablet (20 mg) by mouth daily     Current Facility-Administered Medications   Medication     lidocaine 1% with EPINEPHrine 1:100,000 injection 3 mL     lidocaine 1% with EPINEPHrine 1:100,000 injection 3 mL     There are no discontinued medications.    Physical Exam   Vital Signs: BP (!) 138/90   Pulse 53   Temp 98.1  F (36.7  C)   Wt 76.5 kg (168 lb 9.6 oz)   SpO2 96%   BMI 26.41 kg/m      GENERAL APPEARANCE: alert and no distress  HENT: mouth without ulcers or lesions  LYMPHATICS: no cervical or supraclavicular nodes  RESP: lungs clear to auscultation - no rales, rhonchi or wheezes  CV: regular rhythm, normal rate, no rub, no murmur  EDEMA: no LE edema bilaterally  ABDOMEN: soft, nondistended, nontender, bowel sounds normal  MS: extremities normal - no gross deformities noted, no evidence of inflammation in joints, no muscle tenderness  SKIN: no rash  TX KIDNEY: normal  DIALYSIS ACCESS:  None      Data     Renal Latest Ref Rng & Units 3/9/2021 10/28/2020 12/23/2019   Na 133 - 144 mmol/L 139 138 138   K 3.4 - 5.3 mmol/L 4.2 3.4 3.3(L)   Cl 94 - 109 mmol/L 104 104 105   CO2 20 - 32 mmol/L 28 28 28   BUN 7 - 30 mg/dL 16 11 13   Cr 0.66 - 1.25 mg/dL 0.75 0.74 0.82   Glucose 70 - 99 mg/dL 107(H) 82 104(H)   Ca  8.5 - 10.1 mg/dL 9.5 9.3 9.0   Mg 1.6 - 2.3 mg/dL - - -     Bone Health Latest Ref Rng & Units 3/9/2021 10/28/2020 11/5/2018   Phos 2.5 - 4.5 mg/dL - - 2.9   PTHi 12 - 72 pg/mL - - -   Vit D Def 20 - 75 ug/L 49 36 -     Heme Latest Ref Rng & Units 3/9/2021 10/28/2020 12/23/2019    WBC 4.0 - 11.0 10e9/L 9.4 6.1 8.8   Hgb 13.3 - 17.7 g/dL 13.4 13.4 15.4   Plt 150 - 450 10e9/L 283 306 307   ABSOLUTE NEUTROPHIL 1.6 - 8.3 10e9/L 7.8 - -   ABSOLUTE LYMPHOCYTES 0.8 - 5.3 10e9/L 0.9 - -   ABSOLUTE MONOCYTES 0.0 - 1.3 10e9/L 0.5 - -   ABSOLUTE EOSINOPHILS 0.0 - 0.7 10e9/L 0.1 - -   ABSOLUTE BASOPHILS 0.0 - 0.2 10e9/L 0.0 - -   ABS IMMATURE GRANULOCYTES 0 - 0.4 10e9/L 0.0 - -   ABSOLUTE NUCLEATED RBC - 0.0 - -     Liver Latest Ref Rng & Units 10/28/2020 11/23/2018 10/23/2018   AP 40 - 150 U/L 87 106 87   TBili 0.2 - 1.3 mg/dL 0.6 0.4 0.8   DBili 0.0 - 0.2 mg/dL - - 0.2   ALT 0 - 70 U/L 21 27 22   AST 0 - 45 U/L 21 25 20   Tot Protein 6.8 - 8.8 g/dL 7.2 7.0 7.4   Albumin 3.4 - 5.0 g/dL 3.3(L) 3.1(L) 3.5     Pancreas Latest Ref Rng & Units 10/28/2020 2/12/2012   A1C 0 - 5.6 % 5.8(H) -   Lipase 20 - 250 U/L - 94     Iron studies Latest Ref Rng & Units 1/8/2009   Ferritin 20 - 300 ng/mL 76     [facility] Txp Virology Latest Ref Rng & Units 11/6/2018 9/12/2017 8/2/2017   CVM DNA Quant - - Plasma, EDTA anticoagulant -   CMV Quant <100 Copies/mL - - -   CMV QT Log <2.0 Log copies/mL - - -   CMV QUANT IU/ML CMVND:CMV DNA Not Detected [IU]/mL - CMV DNA Not Detected -   LOG IU/ML OF CMVQNT <2.1 [Log:IU]/mL - Not Calculated -   EBV DNA COPIES/ML EBVNEG:EBV DNA Not Detected [Copies]/mL EBV DNA Not Detected - EBV DNA Not Detected   EBV DNA LOG OF COPIES <2.7 [Log:copies]/mL Not Calculated - Not Calculated   The Real-Time quantitative EBV assay was developed and its performance   characteristics determined by the Infect[facility] Diagnostic Laboratory at   the [facility] in [place], [place].  The   primers and probes are Analyte Specific Reagents (ASRs) manufactured  by   [employer].   ASRs are used in many laboratory tests necessary for standard medical care and   generally do not require U.S. Food and Drug Administration approval.  The FDA   has determined that such clearance or  approval is not necessary.  This test is   used for clinical purposes.  It should not be regarded as investigational or   research.   This laboratory is certified under the Clinical Laboratory Improvement   Amendments of 1988 (CLIA-88) as qualified to perform high complexity clinical   laboratory testing.   The quantitative range of this assay is 500-22,500,00 copies/mL (2.7-7.4 log   copies/mL).  A negative result does not rule out the presence of PCR inhibitors     in the patient specimen or EBV DNA nucleic acid in concentrations below the   level of detection of the assay.  Inhibition may also lead to underestimation   of viral quantitation.     Hep B Core NEG - - -   Hep B Surf - - - -            Recent Labs   Lab Test 09/12/17  0923 11/06/18  0647   DOSMPA 9pm 09.11.2017 Not Provided   MPACID 3.82* 0.55*   MPAG 69.3 29.5*

## 2021-04-05 NOTE — LETTER
4/5/2021       RE: Jerad Ross  [address]  [place] [address]     Dear Colleague,    Thank you for referring your patient, Jerad Ross, to the [facility] NEPHROLOGY [facility]IC [place] at [facility]. Please see a copy of my visit note below.    CHRONIC TRANSPLANT NEPHROLOGY VISIT    Assessment & Plan   # DDKT: Stable   - Baseline Cr ~ 0.8-1.1   - Proteinuria: Minimal (0.2-0.5 grams)   - Date DSA Last Checked: Not Known       Latest DSA: Not checked recently due to time from transplant   - BK Viremia: Not checked recently   - Kidney Tx Biopsy: No    # Immunosuppression: Mycophenolate mofetil (goal 1-3.5) and Prednisone (dose 5 mg daily)   - Changes: No      # Hypertension: Controlled; Goal BP: < 130/80   - Changes: No    # Anemia in Chronic Renal Disease: Hgb: Stable        - Iron studies: Not checked recently       # Electrolytes:   - Potassium; level: Normal  - Magnesium; level: Not checked recently  - Bicarbonate; level: Normal  - Sodium; level: Normal    # Chronic Hep B: on Entecavir     # CAD: s/p CABG now asymptomatic     # Kidney Ultrasound: no cysts will update in a few months.     # Skin Cancer Risk:    - Discussed sun protection and recommend regular follow up with Dermatology.    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    Return visit: No follow-ups on file.      Chief Complaint   Mr. Ross is a 61  year old here for routine follow up.    History of Present Illness   Mr. 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   He has been doing well. No new complaints. Sees dermatology regularly but has not been seen by hepatology in awhile. He takes his antiviral for his chronic Hep B infection.      Recent Hospitalizations:  [x] No [] Yes    New Medical Issues: [x] No [] Yes    Decreased energy: [x] No [] Yes    Chest pain or SOB with exertion:  [x] No [] Yes    Appetite change or weight change: [x] No [] Yes    Nausea, vomiting or diarrhea:  [x] No [] Yes    Fever, sweats or chills: [x] No [] Yes    Leg swelling: [x] No [] Yes      Home BP: Not checked    Review of Systems   A comprehensive review of systems was obtained and negative, except as noted in the HPI or PMH.    Problem List   Patient Active Problem List   Diagnosis     BCC (basal cell carcinoma), trunk     JULIANE (obstructive sleep apnea)     HTN, kidney transplant related     Coronary artery disease involving native coronary artery of native heart without angina pectoris     NSTEMI (non-ST elevated myocardial infarction) (H)     Depression     Diverticulosis     Hemorrhoids     Kidney replaced by transplant     Basal cell carcinoma     Squamous cell carcinoma     Dyslipidemia     CRP elevated     Glaucoma     AION (acute ischemic optic neuropathy)     Paracentral scotoma     Hip pain     Inflamed seborrheic keratosis     Intertrigo     Chronic hepatitis B (H)     Immunosuppression (H)     Hypogonadism in male     GERD (gastroesophageal reflux disease)     Aftercare following organ transplant     Acute midline low back pain without sciatica     Septic bursitis     Impaired mobility     Infection of lumbar spine (H)       Social History   Social History     Tobacco Use     Smoking status: Former Smoker     Packs/day: 1.00     Years: 9.00     Pack years: 9.00     Quit date: 1/1/1988     Years since  quittin.2     Smokeless tobacco: Former User   Substance Use Topics     Alcohol use: Yes     Alcohol/week: 0.0 standard drinks     Comment: rarely 1 drink per month     Drug use: No       Allergies   Allergies   Allergen Reactions     Penicillins Shortness Of Breath and Hives     Keflex [Cephalexin Hcl] Other (See Comments)     Pt could not recall reaction     Tetracycline Other (See Comments)     Patient could not recall reaction     Sulfa Drugs Rash       Medications   Current Outpatient Medications   Medication Sig     acetaminophen (TYLENOL) 325 MG tablet Take 1-2 tablets by mouth every 6 hours as needed.     albuterol (PROAIR HFA) 108 (90 Base) MCG/ACT inhaler Inhale 2 puffs into the lungs every 6 hours as needed for shortness of breath / dyspnea or wheezing     aspirin 81 MG tablet Take 81 mg by mouth daily      budesonide (PULMICORT FLEXHALER) 90 MCG/ACT inhaler Inhale 2 puffs into the lungs 2 times daily     calcium citrate-vitamin D (CITRACAL) 315-200 MG-UNIT TABS Take 1 tablet by mouth daily.     entecavir (BARACLUDE) 0.5 MG tablet Take 1 tablet (0.5 mg) by mouth daily     FLUoxetine (PROZAC) 10 MG capsule Take 1 capsule (10 mg) by mouth daily With 40mg daily for a total daily dose of 50mg     FLUoxetine (PROZAC) 40 MG capsule Take 1 capsule (40 mg) by mouth daily     fluticasone-salmeterol (ADVAIR) 250-50 MCG/DOSE inhaler Inhale 1 puff into the lungs every 12 hours     latanoprost (XALATAN) 0.005 % ophthalmic solution Place 1 drop into both eyes At Bedtime     metoprolol tartrate (LOPRESSOR) 25 MG tablet TAKE 1/2 TABLET BY MOUTH TWICE DAILY     Multiple Vitamins-Iron (ONE DAILY MULTIVITAMIN/IRON) TABS Take 1 tablet by mouth daily     mycophenolate (GENERIC EQUIVALENT) 250 MG capsule Take 3 capsules (750 mg) by mouth 2 times daily     nitroGLYcerin (NITROSTAT) 0.4 MG sublingual tablet Place 0.4 mg under the tongue     Omega-3 Fatty Acids (OMEGA 3 PO) Take 1 capsule by mouth daily Dose unknown      pantoprazole (PROTONIX) 40 MG EC tablet Take 1 tablet (40 mg) by mouth daily     polyethylene glycol (MIRALAX) 17 g packet Take 17 g by mouth     predniSONE (DELTASONE) 5 MG tablet Take 1 tablet (5 mg) by mouth daily     rosuvastatin (CRESTOR) 20 MG tablet TAKE 1 TABLET BY MOUTH EVERY DAY     testosterone (ANDROGEL/TESTIM) 50 MG/5GM (1%) topical gel Place 1 packet (50 mg of testosterone) onto the skin daily Apply to the clean, dry intact skin of the shoulders, upper arms or abdomen.     BETA BLOCKER NOT PRESCRIBED, INTENTIONAL, Beta Blocker not prescribed intentionally due to Bradycardia < 50 bpm without beta blocker therapy     naltrexone (DEPADE/REVIA) 50 MG tablet Take 1 tablet (50 mg) by mouth daily     predniSONE (DELTASONE) 20 MG tablet Take 1 tablet (20 mg) by mouth daily     Current Facility-Administered Medications   Medication     lidocaine 1% with EPINEPHrine 1:100,000 injection 3 mL     lidocaine 1% with EPINEPHrine 1:100,000 injection 3 mL     There are no discontinued medications.    Physical Exam   Vital Signs: BP (!) 138/90   Pulse 53   Temp 98.1  F (36.7  C)   Wt 76.5 kg (168 lb 9.6 oz)   SpO2 96%   BMI 26.41 kg/m      GENERAL APPEARANCE: alert and no distress  HENT: mouth without ulcers or lesions  LYMPHATICS: no cervical or supraclavicular nodes  RESP: lungs clear to auscultation - no rales, rhonchi or wheezes  CV: regular rhythm, normal rate, no rub, no murmur  EDEMA: no LE edema bilaterally  ABDOMEN: soft, nondistended, nontender, bowel sounds normal  MS: extremities normal - no gross deformities noted, no evidence of inflammation in joints, no muscle tenderness  SKIN: no rash  TX KIDNEY: normal  DIALYSIS ACCESS:  None      Data     Renal Latest Ref Rng & Units 3/9/2021 10/28/2020 12/23/2019   Na 133 - 144 mmol/L 139 138 138   K 3.4 - 5.3 mmol/L 4.2 3.4 3.3(L)   Cl 94 - 109 mmol/L 104 104 105   CO2 20 - 32 mmol/L 28 28 28   BUN 7 - 30 mg/dL 16 11 13   Cr 0.66 - 1.25 mg/dL 0.75 0.74 0.82    Glucose 70 - 99 mg/dL 107(H) 82 104(H)   Ca  8.5 - 10.1 mg/dL 9.5 9.3 9.0   Mg 1.6 - 2.3 mg/dL - - -     Bone Health Latest Ref Rng & Units 3/9/2021 10/28/2020 11/5/2018   Phos 2.5 - 4.5 mg/dL - - 2.9   PTHi 12 - 72 pg/mL - - -   Vit D Def 20 - 75 ug/L 49 36 -     Heme Latest Ref Rng & Units 3/9/2021 10/28/2020 12/23/2019   WBC 4.0 - 11.0 10e9/L 9.4 6.1 8.8   Hgb 13.3 - 17.7 g/dL 13.4 13.4 15.4   Plt 150 - 450 10e9/L 283 306 307   ABSOLUTE NEUTROPHIL 1.6 - 8.3 10e9/L 7.8 - -   ABSOLUTE LYMPHOCYTES 0.8 - 5.3 10e9/L 0.9 - -   ABSOLUTE MONOCYTES 0.0 - 1.3 10e9/L 0.5 - -   ABSOLUTE EOSINOPHILS 0.0 - 0.7 10e9/L 0.1 - -   ABSOLUTE BASOPHILS 0.0 - 0.2 10e9/L 0.0 - -   ABS IMMATURE GRANULOCYTES 0 - 0.4 10e9/L 0.0 - -   ABSOLUTE NUCLEATED RBC - 0.0 - -     Liver Latest Ref Rng & Units 10/28/2020 11/23/2018 10/23/2018   AP 40 - 150 U/L 87 106 87   TBili 0.2 - 1.3 mg/dL 0.6 0.4 0.8   DBili 0.0 - 0.2 mg/dL - - 0.2   ALT 0 - 70 U/L 21 27 22   AST 0 - 45 U/L 21 25 20   Tot Protein 6.8 - 8.8 g/dL 7.2 7.0 7.4   Albumin 3.4 - 5.0 g/dL 3.3(L) 3.1(L) 3.5     Pancreas Latest Ref Rng & Units 10/28/2020 2/12/2012   A1C 0 - 5.6 % 5.8(H) -   Lipase 20 - 250 U/L - 94     Iron studies Latest Ref Rng & Units 1/8/2009   Ferritin 20 - 300 ng/mL 76     [facility] Virology Latest Ref Rng & Units 11/6/2018 9/12/2017 8/2/2017   CVM DNA Quant - - Plasma, EDTA anticoagulant -   CMV Quant <100 Copies/mL - - -   CMV QT Log <2.0 Log copies/mL - - -   CMV QUANT IU/ML CMVND:CMV DNA Not Detected [IU]/mL - CMV DNA Not Detected -   LOG IU/ML OF CMVQNT <2.1 [Log:IU]/mL - Not Calculated -   EBV DNA COPIES/ML EBVNEG:EBV DNA Not Detected [Copies]/mL EBV DNA Not Detected - EBV DNA Not Detected   EBV DNA LOG OF COPIES <2.7 [Log:copies]/mL Not Calculated - Not Calculated   The Real-Time quantitative EBV assay was developed and its performance   characteristics determined by the [facility] at   the [facility]  in [place], [place].  The   primers and probes are Analyte Specific Reagents (ASRs) manufactured  by   [employer].   ASRs are used in many laboratory tests necessary for standard medical care and   generally do not require U.S. Food and Drug Administration approval.  The FDA   has determined that such clearance or approval is not necessary.  This test is   used for clinical purposes.  It should not be regarded as investigational or   research.   This laboratory is certified under the Clinical Laboratory Improvement   Amendments of 1988 (CLIA-88) as qualified to perform high complexity clinical   laboratory testing.   The quantitative range of this assay is 500-22,500,00 copies/mL (2.7-7.4 log   copies/mL).  A negative result does not rule out the presence of PCR inhibitors     in the patient specimen or EBV DNA nucleic acid in concentrations below the   level of detection of the assay.  Inhibition may also lead to underestimation   of viral quantitation.     Hep B Core NEG - - -   Hep B Surf - - - -            Recent Labs   Lab Test 09/12/17  0923 11/06/18  0647   DOSMPA 9pm 09.11.2017 Not Provided   MPACID 3.82* 0.55*   MPAG 69.3 29.5*       Again, thank you for allowing me to participate in the care of your patient.      Sincerely,    Pete Henry MD

## 2021-04-05 NOTE — NURSING NOTE
"Chief Complaint   Patient presents with     RECHECK     Follow up TX     Vital signs:  Temp: 98.1  F (36.7  C)   BP: (!) 138/90 Pulse: 53     SpO2: 96 %       Weight: 76.5 kg (168 lb 9.6 oz)  Estimated body mass index is 26.41 kg/m  as calculated from the following:    Height as of 3/9/21: 1.702 m (5' 7\").    Weight as of this encounter: 76.5 kg (168 lb 9.6 oz).      Veronica Moser, [facility]    "

## 2021-04-06 NOTE — PROGRESS NOTES
[facility] -  Case Progress Note      Client Name: Jerad Ross  YOB: 1960  MRN:  [id number]  Treating Provider: Julita Wisdom LP  Type of Session: Individual  Present in Session: client  Number of Minutes: 53    Start time:11:00 am  End time: 11:55 am  Telemedicine Visit: The patient's condition can be safely assessed and treated via synchronous audio and visual telemedicine encounter.      Reason for Telemedicine Visit: Covid-19    Originating Site (Patient Location): Patient's home    Distant Site (Provider Location): Provider Remote Setting    Consent:  The patient/guardian has verbally consented to: the potential risks and benefits of telemedicine (video visit) versus in person care; bill my insurance or make self-payment for services provided; and responsibility for payment of non-covered services.     Mode of Communication:  Video Conference via  used [employer]    As the provider I attest to compliance with applicable laws and regulations related to telemedicine.        Date of Service:4/05/21   Therapist and patient/guardian/family reviewed the Treatment Plan and goals, agree the plan remains current, accurate, and there are no additions or changes.      Health Maintenance Summary - Mental Health Treatment Plan       Status Date      MENTAL HEALTH TX PLAN Next Due 1/22/2022      Done 2/22/2021 HIM MENTAL HEALTH TX PLAN SCAN     Done 11/21/2019 HIM MENTAL HEALTH TX PLAN SCAN        Current Symptoms/Status:  Mild depression anxiety, worry, some thoughts and urges to act out sexually, increase in urges more at bedtime, loneliness,  distress about relationship, ,conflict and tension with his wife due to his past sexual behavior and the impact on the relationship,  financial distress.    Progress Toward Treatment Goals:   Client reported progress with improved mood, maintaining boundaries even when having urges.    Intervention: Modality and Description:  Provided support and  feedback. Used CBT to process thoughts and urges, depression, and health issues.  Client shared that his mood continues to be improved.  He shared that he is engaging in more activities.  He continues to have some urges at bedtime and he is using the strategies of calling someone or engaging in another activity to distract himself.  He said the urges are not there is much during the day.  We talked about how he is been able to maintain boundaries and with this how much better he feels in general.  He shared about having more interest and motivation and engaging in more activities.  One is with exercise but also with socialization.  He processed more about current status of the relationship with his wife and trying to accept where things are at right now.    Response to Intervention:   Client reported gaining insight and validation.    Assignment:  Work on relationship history.    Interactive Complexity:  There are four specific communication difficulties that complicate the work of the primary psychiatric procedure.  Interactive complexity (+[id number]) may be reported when at least one of these difficulties is present.    Communication difficulties present during current the psychiatric procedure include:  1. None.    Diagnosis:  Encounter Diagnoses   Name Primary?     Mild episode of recurrent major depressive disorder (H) Yes     Other Specified Disruptive, Impulse-Control, and Conduct Disorder          Plan / Need for Future Services:  Return for therapy in 2 weeks.      Julita Wisdom Psyd,  LP

## 2021-04-08 ENCOUNTER — TRANSFERRED RECORDS (OUTPATIENT)
Dept: HEALTH INFORMATION MANAGEMENT | Facility: CLINIC | Age: 61
End: 2021-04-08

## 2021-04-09 ENCOUNTER — TRANSFERRED RECORDS (OUTPATIENT)
Dept: HEALTH INFORMATION MANAGEMENT | Facility: CLINIC | Age: 61
End: 2021-04-09

## 2021-04-12 DIAGNOSIS — B18.1 CHRONIC HEPATITIS B (H): ICD-10-CM

## 2021-04-12 LAB
ALBUMIN SERPL-MCNC: 3.1 G/DL (ref 3.4–5)
ALP SERPL-CCNC: 109 U/L (ref 40–150)
ALT SERPL W P-5'-P-CCNC: 30 U/L (ref 0–70)
ANION GAP SERPL CALCULATED.3IONS-SCNC: 2 MMOL/L (ref 3–14)
AST SERPL W P-5'-P-CCNC: 20 U/L (ref 0–45)
BILIRUB DIRECT SERPL-MCNC: 0.2 MG/DL (ref 0–0.2)
BILIRUB SERPL-MCNC: 0.5 MG/DL (ref 0.2–1.3)
BUN SERPL-MCNC: 18 MG/DL (ref 7–30)
CALCIUM SERPL-MCNC: 9 MG/DL (ref 8.5–10.1)
CHLORIDE SERPL-SCNC: 106 MMOL/L (ref 94–109)
CO2 SERPL-SCNC: 32 MMOL/L (ref 20–32)
CREAT SERPL-MCNC: 0.8 MG/DL (ref 0.66–1.25)
ERYTHROCYTE [DISTWIDTH] IN BLOOD BY AUTOMATED COUNT: 16 % (ref 10–15)
GFR SERPL CREATININE-BSD FRML MDRD: >90 ML/MIN/{1.73_M2}
GLUCOSE SERPL-MCNC: 73 MG/DL (ref 70–99)
HCT VFR BLD AUTO: 46 % (ref 40–53)
HGB BLD-MCNC: 14 G/DL (ref 13.3–17.7)
INR PPP: 1.03 (ref 0.86–1.14)
MCH RBC QN AUTO: 27.3 PG (ref 26.5–33)
MCHC RBC AUTO-ENTMCNC: 30.4 G/DL (ref 31.5–36.5)
MCV RBC AUTO: 90 FL (ref 78–100)
PLATELET # BLD AUTO: 310 10E9/L (ref 150–450)
POTASSIUM SERPL-SCNC: 4.3 MMOL/L (ref 3.4–5.3)
PROT SERPL-MCNC: 6.6 G/DL (ref 6.8–8.8)
RBC # BLD AUTO: 5.13 10E12/L (ref 4.4–5.9)
SODIUM SERPL-SCNC: 140 MMOL/L (ref 133–144)
WBC # BLD AUTO: 9.9 10E9/L (ref 4–11)

## 2021-04-12 PROCEDURE — 85610 PROTHROMBIN TIME: CPT | Performed by: PATHOLOGY

## 2021-04-12 PROCEDURE — 85027 COMPLETE CBC AUTOMATED: CPT | Performed by: PATHOLOGY

## 2021-04-12 PROCEDURE — 80048 BASIC METABOLIC PNL TOTAL CA: CPT | Performed by: PATHOLOGY

## 2021-04-12 PROCEDURE — 80076 HEPATIC FUNCTION PANEL: CPT | Performed by: PATHOLOGY

## 2021-04-12 PROCEDURE — 36415 COLL VENOUS BLD VENIPUNCTURE: CPT | Performed by: PATHOLOGY

## 2021-04-12 PROCEDURE — 99000 SPECIMEN HANDLING OFFICE-LAB: CPT | Performed by: PATHOLOGY

## 2021-04-12 PROCEDURE — 87517 HEPATITIS B DNA QUANT: CPT | Mod: 90 | Performed by: PATHOLOGY

## 2021-04-13 ENCOUNTER — VIRTUAL VISIT (OUTPATIENT)
Dept: GASTROENTEROLOGY | Facility: CLINIC | Age: 61
End: 2021-04-13
Attending: INTERNAL MEDICINE
Payer: COMMERCIAL

## 2021-04-13 DIAGNOSIS — B18.1 CHRONIC HEPATITIS B (H): ICD-10-CM

## 2021-04-13 DIAGNOSIS — B18.1 CHRONIC VIRAL HEPATITIS B WITHOUT DELTA AGENT AND WITHOUT COMA (H): Primary | ICD-10-CM

## 2021-04-13 DIAGNOSIS — B18.1 CHRONIC VIRAL HEPATITIS B WITHOUT DELTA AGENT AND WITHOUT COMA (H): ICD-10-CM

## 2021-04-13 DIAGNOSIS — B18.1 VIRAL HEPATITIS B CHRONIC (H): ICD-10-CM

## 2021-04-13 LAB
HBV DNA SERPL NAA+PROBE-ACNC: NORMAL [IU]/ML
HBV DNA SERPL NAA+PROBE-LOG IU: NORMAL {LOG_IU}/ML

## 2021-04-13 PROCEDURE — 99203 OFFICE O/P NEW LOW 30 MIN: CPT | Mod: 95 | Performed by: INTERNAL MEDICINE

## 2021-04-13 RX ORDER — ENTECAVIR 0.5 MG/1
0.5 TABLET, FILM COATED ORAL DAILY
Qty: 90 TABLET | Refills: 3 | Status: SHIPPED | OUTPATIENT
Start: 2021-04-13 | End: 2022-04-06

## 2021-04-13 NOTE — RESULT ENCOUNTER NOTE
Your tests results look good Your liver tests are stable and the hepatitis B virus level is is showing that the virus is not detectable--indicating the medication is working. Continue to take the entecavir daily.

## 2021-04-13 NOTE — PROGRESS NOTES
[facility]  LIVER CLINIC FOLLOW UP  Video Visit  A/P  Mr. Ross is a 61 Y M with HBV.     HBV He is on entecavir with normal liver tests. F1-F2 by fibrosis scan in 2017.   HBV DNA below limits of detection in 2018.  Discussed this with him.  Continue entecavir (renewed).   Will continue with every 6 month labs and US. Ordered.    CRC screening due 2025    RTC 1 y.  Lina Claros MD    Hepatology/Liver Transplant  Medical Director, Liver Transplantation  [facility]    Appointments [phone]  Clinic Fax [phone]  Transplant Care [phone] Option 4  Transplant Fax [phone]  Administrative Office [phone]  Administrative Fax [phone]  ===================================================================  Subjective  Jerad Ross is a 61 Y M with hepatitis B.  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 On entecavir since 2016. He is having no problems getting it through a marjan and takes it daily.    He states he has been well. He underwent CABG about 10 months ago and is recovering well.    eAby positive  eAg negative  Assessment of fibrosis: fibrosis scan 9/16/17 F1-F2  Histrory of treatment: entecavir only. Was not able to get it reliably in past. Now he can and has been taking it consistently.      HCV negative in 2008  HIV not documented    Lab Test 04/12/21  1001   PROTTOTAL 6.6*   ALBUMIN 3.1*   BILITOTAL 0.5   ALKPHOS 109   AST 20   ALT 30     Lab Test 04/12/21  1001   WBC 9.9   RBC 5.13   HGB 14.0   HCT 46.0   MCV 90   MCH 27.3   MCHC 30.4*   RDW 16.0*        ETOH use: none     Past Medical History        Past Medical History:   Diagnosis Date     AION (acute ischemic optic neuropathy)       Anemia in chronic renal disease       Avascular necrosis of bones of both hips (H)       s/p bilateral hip replacements     Basal cell carcinoma       CAD (coronary artery disease) 6/17/2014     Chronic hepatitis B (H)       Clostridium  difficile colitis       CRP elevated       Depression       Diverticulosis       Dyslipidemia       FSGS (focal segmental glomerulosclerosis)       Gastric ulcer with hemorrhage 2/12/12     GERD (gastroesophageal reflux disease)       Glaucoma       OHTN     Hemorrhoids       Hypertension secondary to other renal disorders       Hypogonadism in male       Immunosuppressed status (H)       Kidney replaced by transplant       focal glomerulosclerosis      NSTEMI (non-ST elevated myocardial infarction) (H) 6/17/2014     JULIANE (obstructive sleep apnea)       Doesn't use CPAP     Paracentral scotoma       LE     Secondary renal hyperparathyroidism (H)       Squamous cell carcinoma             Social History   Social History            Social History     Marital status:        Spouse name: N/A     Number of children: 0     Years of education: N/A           Occupational History       Disabled      [employer]              Social History Main Topics     Smoking status: Former Smoker       Packs/day: 1.00       Years: 9.00       Quit date: 1/1/1988     Smokeless tobacco: Former User     Alcohol use 0.0 oz/week        0 Standard drinks or equivalent per week          Comment: rarely 1 drink per month     Drug use: No     Sexual activity: Not on file            Other Topics Concern     Special Diet No     Exercise Yes       walks      Social History Narrative           Family History          Family History   Problem Relation Age of Onset     Cardiovascular Father         AI with valve repair     Hypertension Father       KIDNEY DISEASE Father       CANCER Paternal Grandmother         ovarian ca     CEREBROVASCULAR DISEASE Paternal Grandfather       CEREBROVASCULAR DISEASE Maternal Grandfather       KIDNEY DISEASE Paternal Aunt       Skin Cancer No family hx of       Glaucoma No family hx of       Macular Degeneration No family hx of       Amblyopia No family hx of             ROS: 10 point ROS neg other  "than the symptoms noted above in the HPI.  Exam  Gen Alert pleasant NAD  Resp No difficulty breathing. No cough  Skin No Jaundice  Eyes No icterus  Neuro EDMONDSON  MSK no muscle wasting  Psyche Pleasant, appropriate. Well groomed.  Jerad Ross is a 61 year old male who is being evaluated via a billable video visit.      The patient has been notified of following:   \"This video visit will be conducted via a call between you and your physician/provider. We have found that certain health care needs can be provided without the need for an in-person physical exam.  This service lets us provide the care you need with a video conversation.  If a prescription is necessary we can send it directly to your pharmacy.  If lab work is needed we can place an order for that and you can then stop by our lab to have the test done at a later time. Video visits are billed at different rates depending on your insurance coverage.  Please reach out to your insurance provider with any questions.  If during the course of the call the physician/provider feels a video visit is not appropriate, you will not be charged for this service.\"   Patient has given verbal consent for Video visit? Yes    Type of service:  Video Visit  Video Start Time 937  Video End Time 952  Patient location: home  Will anyone else be joining your video visit? no  {If patient encounters technical issues they should call [phone] :1  Distant Location (provider location):  [facility]PATOLOG[facility] [place]   Mode of Communication:  Video Conference via [employer]  I have reviewed and updated the patient's Past Medical History, Social History, Family History and Medication List.          "

## 2021-04-13 NOTE — LETTER
4/13/2021         RE: Jerad Ross  [address]  [place] [address]        Dear Colleague,    Thank you for referring your patient, Jerad Ross, to the [facility]PATOLOG[facility] [place]. Please see a copy of my visit note below.    [facility]  LIVER CLINIC FOLLOW UP  Video Visit  A/P  Mr. Ross is a 61 Y M with HBV.     HBV He is on entecavir with normal liver tests. F1-F2 by fibrosis scan in 2017.   HBV DNA below limits of detection in 2018.  Discussed this with him.  Continue entecavir (renewed).   Will continue with every 6 month labs and US. Ordered.    CRC screening due 2025    RTC 1 y.  Lina Claros MD    Hepatology/Liver Transplant  Medical Director, Liver Transplantation  [facility]    Appointments [phone]  Clinic Fax [phone]  Transplant Care [phone] Option 4  Transplant Fax [phone]  Administrative Office [phone]  Administrative Fax [phone]  ===================================================================  Subjective  Jerad Ross is a 61 Y M with hepatitis B.  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 On entecavir since 2016. He is having no problems getting it through a marjan and takes it daily.    He states he has been well. He underwent CABG about 10 months ago and is recovering well.    eAby positive  eAg negative  Assessment of fibrosis: fibrosis scan 9/16/17 F1-F2  Histrory of treatment: entecavir only. Was not able to get it reliably in past. Now he can and has been taking it consistently.      HCV negative in 2008  HIV not documented    Lab Test 04/12/21  1001   PROTTOTAL 6.6*   ALBUMIN 3.1*   BILITOTAL 0.5   ALKPHOS 109   AST 20   ALT 30     Lab Test 04/12/21  1001   WBC 9.9   RBC 5.13   HGB 14.0   HCT 46.0   MCV 90   MCH 27.3   MCHC 30.4*   RDW 16.0*        ETOH use: none     Past Medical History        Past Medical History:   Diagnosis Date     AION (acute  ischemic optic neuropathy)       Anemia in chronic renal disease       Avascular necrosis of bones of both hips (H)       s/p bilateral hip replacements     Basal cell carcinoma       CAD (coronary artery disease) 6/17/2014     Chronic hepatitis B (H)       Clostridium difficile colitis       CRP elevated       Depression       Diverticulosis       Dyslipidemia       FSGS (focal segmental glomerulosclerosis)       Gastric ulcer with hemorrhage 2/12/12     GERD (gastroesophageal reflux disease)       Glaucoma       OHTN     Hemorrhoids       Hypertension secondary to other renal disorders       Hypogonadism in male       Immunosuppressed status (H)       Kidney replaced by transplant       focal glomerulosclerosis      NSTEMI (non-ST elevated myocardial infarction) (H) 6/17/2014     JULIANE (obstructive sleep apnea)       Doesn't use CPAP     Paracentral scotoma       LE     Secondary renal hyperparathyroidism (H)       Squamous cell carcinoma             Social History   Social History            Social History     Marital status:        Spouse name: N/A     Number of children: 0     Years of education: N/A           Occupational History       Disabled      [employer]              Social History Main Topics     Smoking status: Former Smoker       Packs/day: 1.00       Years: 9.00       Quit date: 1/1/1988     Smokeless tobacco: Former User     Alcohol use 0.0 oz/week        0 Standard drinks or equivalent per week          Comment: rarely 1 drink per month     Drug use: No     Sexual activity: Not on file            Other Topics Concern     Special Diet No     Exercise Yes       walks      Social History Narrative           Family History          Family History   Problem Relation Age of Onset     Cardiovascular Father         AI with valve repair     Hypertension Father       KIDNEY DISEASE Father       CANCER Paternal Grandmother         ovarian ca     CEREBROVASCULAR DISEASE Paternal  "Grandfather       CEREBROVASCULAR DISEASE Maternal Grandfather       KIDNEY DISEASE Paternal Aunt       Skin Cancer No family hx of       Glaucoma No family hx of       Macular Degeneration No family hx of       Amblyopia No family hx of             ROS: 10 point ROS neg other than the symptoms noted above in the HPI.  Exam  Gen Alert pleasant NAD  Resp No difficulty breathing. No cough  Skin No Jaundice  Eyes No icterus  Neuro EDMONDSON  MSK no muscle wasting  Psyche Pleasant, appropriate. Well groomed.  Jerad Ross is a 61 year old male who is being evaluated via a billable video visit.      The patient has been notified of following:   \"This video visit will be conducted via a call between you and your physician/provider. We have found that certain health care needs can be provided without the need for an in-person physical exam.  This service lets us provide the care you need with a video conversation.  If a prescription is necessary we can send it directly to your pharmacy.  If lab work is needed we can place an order for that and you can then stop by our lab to have the test done at a later time. Video visits are billed at different rates depending on your insurance coverage.  Please reach out to your insurance provider with any questions.  If during the course of the call the physician/provider feels a video visit is not appropriate, you will not be charged for this service.\"   Patient has given verbal consent for Video visit? Yes    Type of service:  Video Visit  Video Start Time 937  Video End Time 952  Patient location: home  Will anyone else be joining your video visit? no  {If patient encounters technical issues they should call [phone] :1  Distant Location (provider location):  [facility]PATOLOGY [facility] [place]   Mode of Communication:  Video Conference via [employer]  I have reviewed and updated the patient's Past Medical History, Social History, Family History and Medication " List.        Again, thank you for allowing me to participate in the care of your patient.        Sincerely,        Lina Claros MD

## 2021-04-13 NOTE — PROGRESS NOTES
"Jerad is a 61 year old who is being evaluated via a billable video visit.      How would you like to obtain your AVS? MyChart  If the video visit is dropped, the invitation should be resent by: Text to cell phone: [phone]  Will anyone else be joining your video visit? No  {If patient encounters technical issues they should call [phone] :[id number]}    Video Start Time: {video visit start/end time for provider to select:152948}  Video-Visit Details    Type of service:  Video Visit    Video End Time:{video visit start/end time for provider to select:152948}    Originating Location (pt. Location): {video visit patient location:831749::\"Home\"}    Distant Location (provider location):  [facility]PATOLOG[facility] [place]     Platform used for Video Visit: {Virtual Visit Platforms:568395::\"[employer]\"}      Emerald PENNY CMA"

## 2021-04-19 DIAGNOSIS — Z94.0 KIDNEY TRANSPLANTED: Primary | ICD-10-CM

## 2021-04-19 RX ORDER — PREDNISONE 5 MG/1
5 TABLET ORAL DAILY
Qty: 90 TABLET | Refills: 3 | Status: SHIPPED | OUTPATIENT
Start: 2021-04-19 | End: 2022-04-11

## 2021-04-20 ENCOUNTER — COMMUNICATION - HEALTHEAST (OUTPATIENT)
Dept: CARDIOLOGY | Facility: CLINIC | Age: 61
End: 2021-04-20

## 2021-04-20 DIAGNOSIS — Z95.1 S/P CABG (CORONARY ARTERY BYPASS GRAFT): ICD-10-CM

## 2021-04-26 ENCOUNTER — VIRTUAL VISIT (OUTPATIENT)
Dept: PSYCHOLOGY | Facility: CLINIC | Age: 61
End: 2021-04-26
Payer: COMMERCIAL

## 2021-04-26 DIAGNOSIS — F91.8 CONDUCT DISORDER, UNDIFFERENTIATED TYPE: ICD-10-CM

## 2021-04-26 DIAGNOSIS — F33.0 MILD EPISODE OF RECURRENT MAJOR DEPRESSIVE DISORDER (H): Primary | ICD-10-CM

## 2021-04-26 PROCEDURE — 99207 PR NO CHARGE LOS: CPT | Performed by: PSYCHOLOGIST

## 2021-04-26 PROCEDURE — 90837 PSYTX W PT 60 MINUTES: CPT | Mod: 95 | Performed by: PSYCHOLOGIST

## 2021-04-27 NOTE — PROGRESS NOTES
[facility] -  Case Progress Note      Client Name: Jerad Ross  YOB: 1960  MRN:  [id number]  Treating Provider: Julita Wisdom LP  Type of Session: Individual  Present in Session: client  Number of Minutes: 53    Start time:11:00 am  End time: 11:55 am  Telemedicine Visit: The patient's condition can be safely assessed and treated via synchronous audio and visual telemedicine encounter.      Reason for Telemedicine Visit: Covid-19    Originating Site (Patient Location): Patient's home    Distant Site (Provider Location): Provider Remote Setting    Consent:  The patient/guardian has verbally consented to: the potential risks and benefits of telemedicine (video visit) versus in person care; bill my insurance or make self-payment for services provided; and responsibility for payment of non-covered services.     Mode of Communication:  Video Conference via  used Doxy    As the provider I attest to compliance with applicable laws and regulations related to telemedicine.        Date of Service:4/26/21   Therapist and patient/guardian/family reviewed the Treatment Plan and goals, agree the plan remains current, accurate, and there are no additions or changes.      Health Maintenance Summary - Mental Health Treatment Plan       Status Date      MENTAL HEALTH TX PLAN Next Due 1/22/2022      Done 2/22/2021 HIM MENTAL HEALTH TX PLAN SCAN     Done 11/21/2019 [facility] MENTAL HEALTH TX PLAN SCAN        Current Symptoms/Status:  Mild depression anxiety, worry, some thoughts and urges to act out sexually, increase in urges more at bedtime, loneliness,  distress about relationship, ,conflict and tension with his wife due to his past sexual behavior and the impact on the relationship,  financial distress.    Progress Toward Treatment Goals:   Client reported progress with improved mood, maintaining boundaries even when having urges.    Intervention: Modality and Description:  Provided support and  feedback. Used CBT to process thoughts and urges, depression, and health issues.  Client shared that his mood continues to be relatively stable without much depression.  He shared that he still experiences more urges or sexual thoughts at night when he is going to bed.  He shared that he has been able to interrupt these or he will call a friend.  Again validated the importance of getting through these without going to sexual material because we can see the positive benefit of him following his boundaries on his mood social and emotional functioning.  He shared that he is feeling like he maybe needs to take another step with his relationship.  Explored what he is thinking about the relationship and what he should do.  Gave him feedback that initially it sounded like he was going to ask his wife for where she is at and we discussed this pattern puts it more on her rather than him making a decision for himself.  We processed potential steps that he can take right now and exploring what he wants to do.  Discussed interaction with her would probably be best if it is providing her information about his thoughts and decisions rather than asking her what she thinks.      Response to Intervention:   Client reported gaining insight and validation.    Assignment:  Work on relationship history.    Interactive Complexity:  There are four specific communication difficulties that complicate the work of the primary psychiatric procedure.  Interactive complexity (+08712) may be reported when at least one of these difficulties is present.    Communication difficulties present during current the psychiatric procedure include:  1. None.    Diagnosis:  Encounter Diagnoses   Name Primary?     Mild episode of recurrent major depressive disorder (H) Yes     Other Specified Disruptive, Impulse-Control, and Conduct Disorder          Plan / Need for Future Services:  Return for therapy in 2 weeks.      Julita Wisdom Psyd,  LP

## 2021-05-03 ENCOUNTER — VIRTUAL VISIT (OUTPATIENT)
Dept: PSYCHOLOGY | Facility: CLINIC | Age: 61
End: 2021-05-03
Payer: COMMERCIAL

## 2021-05-03 DIAGNOSIS — F33.0 MILD EPISODE OF RECURRENT MAJOR DEPRESSIVE DISORDER (H): Primary | ICD-10-CM

## 2021-05-03 DIAGNOSIS — F91.8 CONDUCT DISORDER, UNDIFFERENTIATED TYPE: ICD-10-CM

## 2021-05-03 PROCEDURE — 99207 PR NO CHARGE LOS: CPT | Performed by: PSYCHOLOGIST

## 2021-05-03 PROCEDURE — 90837 PSYTX W PT 60 MINUTES: CPT | Mod: 95 | Performed by: PSYCHOLOGIST

## 2021-05-08 NOTE — PROGRESS NOTES
[facility] -  Case Progress Note      Client Name: Jerad Ross  YOB: 1960  MRN:  [id number]  Treating Provider: Julita Wisdom LP  Type of Session: Individual  Present in Session: client  Number of Minutes: 53    Start time:10: 00 am  End time: 10:55 am  Telemedicine Visit: The patient's condition can be safely assessed and treated via synchronous audio and visual telemedicine encounter.      Reason for Telemedicine Visit: Covid-19    Originating Site (Patient Location): Patient's home    Distant Site (Provider Location): Provider Remote Setting    Consent:  The patient/guardian has verbally consented to: the potential risks and benefits of telemedicine (video visit) versus in person care; bill my insurance or make self-payment for services provided; and responsibility for payment of non-covered services.     Mode of Communication:  Video Conference via  used Doxy    As the provider I attest to compliance with applicable laws and regulations related to telemedicine.        Date of Service:5/03/21   Therapist and patient/guardian/family reviewed the Treatment Plan and goals, agree the plan remains current, accurate, and there are no additions or changes.      Health Maintenance Summary - Mental Health Treatment Plan       Status Date      MENTAL HEALTH TX PLAN Next Due 1/22/2022      Done 2/22/2021 [facility] MENTAL HEALTH TX PLAN SCAN     Done 11/21/2019 HIM MENTAL HEALTH TX PLAN SCAN        Current Symptoms/Status:  Mild depression anxiety, worry, some thoughts and urges to act out sexually, boundary violations, grief and loss, increase in urges more at bedtime, loneliness,  distress about relationship, ,conflict and tension with his wife due to his past sexual behavior and the impact on the relationship,  financial distress.    Progress Toward Treatment Goals:   Client reported progress feeling disappointment regarding having a boundary violation.    Intervention: Modality and  Description:  Provided support and feedback. Used CBT to process thoughts and urges, depression, and health issues.  Client shared that he is feeling a little down and disappointment with having the boundary violation.  We discussed the circumstances of the boundary violation which happened in the evening.  He shared that prior to the boundary violation he was watching a movie with some of his roommates that may have had some sexual contact that triggered this boundary evaluation.  As we started talking about his weekend he shared that he went to look for houses on Saturday and realized that he couldn't find anything that would really work for him in his price range.  He shared that a lot of the places he found had stairs and other things that would make it difficult for him.  It seems client did not recognize the degree of his despair regarding what would be available for houses.  We discussed this as being something that triggered a longstanding issue of how his health has interfered with him being able to do some of the things that he wants in life.  Explored how he talks about this grief and loss with his support people and he stated that he does not really do that.  He shared he thinks he attempted to do that with his friends but did not get much response.  He has a believe that people will think he is feeling sorry for himself and will not really understand the issue.  We talked about him having to acknowledge repeated experiences of feeling this grief and loss related to his health.  We discussed that he can start thinking about how he can really talk about this with some support people and recognize it as a part of his cycle and a trigger to acting out behavior.      Response to Intervention:   Client reported gaining insight and validation.    Assignment:  Work on relationship history.    Interactive Complexity:  There are four specific communication difficulties that complicate the work of the primary  psychiatric procedure.  Interactive complexity (+21908) may be reported when at least one of these difficulties is present.    Communication difficulties present during current the psychiatric procedure include:  1. None.    Diagnosis:  Encounter Diagnoses   Name Primary?     Mild episode of recurrent major depressive disorder (H) Yes     Other Specified Disruptive, Impulse-Control, and Conduct Disorder          Plan / Need for Future Services:  Return for therapy in 2 weeks.      Julita Wisdom Psyd,  LP

## 2021-05-10 ENCOUNTER — VIRTUAL VISIT (OUTPATIENT)
Dept: PSYCHOLOGY | Facility: CLINIC | Age: 61
End: 2021-05-10
Payer: COMMERCIAL

## 2021-05-10 DIAGNOSIS — F33.0 MILD EPISODE OF RECURRENT MAJOR DEPRESSIVE DISORDER (H): Primary | ICD-10-CM

## 2021-05-10 DIAGNOSIS — F91.8 CONDUCT DISORDER, UNDIFFERENTIATED TYPE: ICD-10-CM

## 2021-05-10 PROCEDURE — 99207 PR NO CHARGE LOS: CPT | Performed by: PSYCHOLOGIST

## 2021-05-10 PROCEDURE — 90837 PSYTX W PT 60 MINUTES: CPT | Mod: 95 | Performed by: PSYCHOLOGIST

## 2021-05-10 NOTE — PROGRESS NOTES
[facility] -  Case Progress Note      Client Name: Jerad Ross  YOB: 1960  MRN:  [id number]  Treating Provider: Julita Wisdom LP  Type of Session: Individual  Present in Session: client  Number of Minutes: 53    Start time:10: 00 am  End time: 10:55 am  Telemedicine Visit: The patient's condition can be safely assessed and treated via synchronous audio and visual telemedicine encounter.      Reason for Telemedicine Visit: Covid-19    Originating Site (Patient Location): Patient's home    Distant Site (Provider Location): Provider Remote Setting    Consent:  The patient/guardian has verbally consented to: the potential risks and benefits of telemedicine (video visit) versus in person care; bill my insurance or make self-payment for services provided; and responsibility for payment of non-covered services.     Mode of Communication:  Video Conference via  used Doxy    As the provider I attest to compliance with applicable laws and regulations related to telemedicine.        Date of Service:5/10/21   Therapist and patient/guardian/family reviewed the Treatment Plan and goals, agree the plan remains current, accurate, and there are no additions or changes.      Health Maintenance Summary - Mental Health Treatment Plan       Status Date      MENTAL HEALTH TX PLAN Next Due 1/22/2022      Done 2/22/2021 [facility] MENTAL HEALTH TX PLAN SCAN     Done 11/21/2019 HIM MENTAL HEALTH TX PLAN SCAN        Current Symptoms/Status:  Mild depression anxiety, worry, some thoughts and urges to act out sexually, grief and loss, increase in urges more at bedtime, loneliness,  distress about relationship, ,conflict and tension with his wife due to his past sexual behavior and the impact on the relationship,  financial distress.    Progress Toward Treatment Goals:   Client reported progress improved mood and maintaining boundaries.    Intervention: Modality and Description:  Provided support and  feedback. Used CBT to process thoughts and urges, depression, and health issues.  Client shared that he is feeling much better this week.  He stated that he was able to find a house that he put a bed in on and he is feeling good about being able to afford the place.  He shared that he was able to get through urges without having any boundary violations this past week.  We talked more about needing to have the awareness of his grief around his health and the limitations that he has brought about in his life.  Discussed that he was able to get through that emotionally and then find a house rather than it just being a hopeless situation.  Client acknowledged seeing this pattern and working on being aware of it.  He shared that he also sent information to his wife about the desire to have a legal separation.  He shared he did not hear anything back from her.  Validated that he shared with her what he needed without reporting that as an expectation of her making the decision.  Informed client of my intention to retire in the fall.      Response to Intervention:   Client reported gaining insight and validation.    Assignment:  Work on relationship history.    Interactive Complexity:  There are four specific communication difficulties that complicate the work of the primary psychiatric procedure.  Interactive complexity (+26158) may be reported when at least one of these difficulties is present.    Communication difficulties present during current the psychiatric procedure include:  1. None.    Diagnosis:  Encounter Diagnoses   Name Primary?     Mild episode of recurrent major depressive disorder (H) Yes     Other Specified Disruptive, Impulse-Control, and Conduct Disorder          Plan / Need for Future Services:  Return for therapy in 2 weeks.      Julita Wisdom Psyd,  LP

## 2021-05-17 ENCOUNTER — VIRTUAL VISIT (OUTPATIENT)
Dept: PSYCHOLOGY | Facility: CLINIC | Age: 61
End: 2021-05-17
Payer: COMMERCIAL

## 2021-05-17 DIAGNOSIS — F33.0 MILD EPISODE OF RECURRENT MAJOR DEPRESSIVE DISORDER (H): Primary | ICD-10-CM

## 2021-05-17 DIAGNOSIS — F91.8 CONDUCT DISORDER, UNDIFFERENTIATED TYPE: ICD-10-CM

## 2021-05-17 PROCEDURE — 90837 PSYTX W PT 60 MINUTES: CPT | Mod: 95 | Performed by: PSYCHOLOGIST

## 2021-05-17 PROCEDURE — 99207 PR NO CHARGE LOS: CPT | Performed by: PSYCHOLOGIST

## 2021-05-21 NOTE — PROGRESS NOTES
[facility] -  Case Progress Note      Client Name: Jerad Ross  YOB: 1960  MRN:  [id number]  Treating Provider: Julita Wisdom LP  Type of Session: Individual  Present in Session: client  Number of Minutes: 53    Start time:10: 00 am  End time: 10:55 am  Telemedicine Visit: The patient's condition can be safely assessed and treated via synchronous audio and visual telemedicine encounter.      Reason for Telemedicine Visit: Covid-19    Originating Site (Patient Location): Patient's home    Distant Site (Provider Location): Provider Remote Setting    Consent:  The patient/guardian has verbally consented to: the potential risks and benefits of telemedicine (video visit) versus in person care; bill my insurance or make self-payment for services provided; and responsibility for payment of non-covered services.     Mode of Communication:  Video Conference via  used Doxy    As the provider I attest to compliance with applicable laws and regulations related to telemedicine.        Date of Service:5/17/21   Therapist and patient/guardian/family reviewed the Treatment Plan and goals, agree the plan remains current, accurate, and there are no additions or changes.      Health Maintenance Summary - Mental Health Treatment Plan       Status Date      MENTAL HEALTH TX PLAN Next Due 1/22/2022      Done 2/22/2021 [facility] MENTAL HEALTH TX PLAN SCAN     Done 11/21/2019 HIM MENTAL HEALTH TX PLAN SCAN        Current Symptoms/Status:  Mild depression anxiety, worry, some thoughts and urges to act out sexually, grief and loss, increase in urges more at bedtime, loneliness,  distress about relationship, ,conflict and tension with his wife due to his past sexual behavior and the impact on the relationship,  financial distress.    Progress Toward Treatment Goals:   Client reported progress improved mood and maintaining boundaries.    Intervention: Modality and Description:  Provided support and  feedback. Used CBT to process thoughts and urges, depression, and health issues.  Client shared that he is doing better this week.  He talked about some of the urges that he has and nighttime being still vulnerable for him.  We talked about the need to keep focused on intervening at that time of night.  Client also processed more about his relationship and sending her information regarding wanting a legal separation.  He shared that he still has not heard a response from her.  We talked about that there is nothing he can do about that and he needs to focus on his communication of what he needs and his intent.     Response to Intervention:   Client reported gaining insight and validation.    Assignment:  Work on relationship history.    Interactive Complexity:  There are four specific communication difficulties that complicate the work of the primary psychiatric procedure.  Interactive complexity (+00622) may be reported when at least one of these difficulties is present.    Communication difficulties present during current the psychiatric procedure include:  1. None.    Diagnosis:  Encounter Diagnoses   Name Primary?     Mild episode of recurrent major depressive disorder (H) Yes     Other Specified Disruptive, Impulse-Control, and Conduct Disorder          Plan / Need for Future Services:  Return for therapy in 2 weeks.      Julita Wisdom Psyd,  LP

## 2021-06-02 VITALS — WEIGHT: 170.3 LBS | BODY MASS INDEX: 25.89 KG/M2

## 2021-06-04 VITALS — BODY MASS INDEX: 24.98 KG/M2 | WEIGHT: 164.3 LBS

## 2021-06-04 VITALS — WEIGHT: 165.2 LBS | BODY MASS INDEX: 25.12 KG/M2

## 2021-06-04 VITALS
RESPIRATION RATE: 21 BRPM | DIASTOLIC BLOOD PRESSURE: 72 MMHG | BODY MASS INDEX: 25.12 KG/M2 | SYSTOLIC BLOOD PRESSURE: 104 MMHG | TEMPERATURE: 97.8 F | OXYGEN SATURATION: 99 % | WEIGHT: 165.2 LBS | HEART RATE: 81 BPM

## 2021-06-04 VITALS — WEIGHT: 166 LBS | BODY MASS INDEX: 25.24 KG/M2

## 2021-06-04 VITALS
BODY MASS INDEX: 25.88 KG/M2 | HEIGHT: 68 IN | HEIGHT: 68 IN | WEIGHT: 170.8 LBS | BODY MASS INDEX: 25.88 KG/M2 | WEIGHT: 170.8 LBS

## 2021-06-04 VITALS
BODY MASS INDEX: 24.84 KG/M2 | TEMPERATURE: 98.2 F | SYSTOLIC BLOOD PRESSURE: 117 MMHG | WEIGHT: 163.4 LBS | DIASTOLIC BLOOD PRESSURE: 73 MMHG | RESPIRATION RATE: 17 BRPM | OXYGEN SATURATION: 98 % | HEART RATE: 72 BPM

## 2021-06-04 VITALS
SYSTOLIC BLOOD PRESSURE: 108 MMHG | BODY MASS INDEX: 24.68 KG/M2 | WEIGHT: 162.3 LBS | RESPIRATION RATE: 17 BRPM | OXYGEN SATURATION: 97 % | DIASTOLIC BLOOD PRESSURE: 69 MMHG | TEMPERATURE: 98.5 F | HEART RATE: 71 BPM

## 2021-06-04 VITALS — WEIGHT: 169.4 LBS | BODY MASS INDEX: 25.76 KG/M2

## 2021-06-04 VITALS — BODY MASS INDEX: 25.09 KG/M2 | WEIGHT: 165 LBS

## 2021-06-04 VITALS
RESPIRATION RATE: 16 BRPM | WEIGHT: 162.6 LBS | OXYGEN SATURATION: 96 % | TEMPERATURE: 98 F | DIASTOLIC BLOOD PRESSURE: 68 MMHG | SYSTOLIC BLOOD PRESSURE: 96 MMHG | BODY MASS INDEX: 24.72 KG/M2

## 2021-06-04 VITALS
WEIGHT: 163.6 LBS | SYSTOLIC BLOOD PRESSURE: 112 MMHG | OXYGEN SATURATION: 97 % | TEMPERATURE: 97.3 F | HEART RATE: 71 BPM | RESPIRATION RATE: 14 BRPM | BODY MASS INDEX: 24.88 KG/M2 | DIASTOLIC BLOOD PRESSURE: 77 MMHG

## 2021-06-04 VITALS — BODY MASS INDEX: 25.16 KG/M2 | WEIGHT: 165.5 LBS

## 2021-06-04 VITALS — WEIGHT: 168.6 LBS | BODY MASS INDEX: 25.64 KG/M2

## 2021-06-04 VITALS — BODY MASS INDEX: 24.95 KG/M2 | WEIGHT: 164.1 LBS

## 2021-06-04 VITALS — WEIGHT: 162.5 LBS | BODY MASS INDEX: 24.71 KG/M2

## 2021-06-04 VITALS — WEIGHT: 165.5 LBS | BODY MASS INDEX: 25.16 KG/M2

## 2021-06-04 VITALS — BODY MASS INDEX: 25.41 KG/M2 | WEIGHT: 167.1 LBS

## 2021-06-04 VITALS — BODY MASS INDEX: 24.69 KG/M2 | WEIGHT: 162.4 LBS

## 2021-06-04 VITALS — WEIGHT: 166.5 LBS | BODY MASS INDEX: 25.32 KG/M2

## 2021-06-04 VITALS — WEIGHT: 163.2 LBS | BODY MASS INDEX: 24.81 KG/M2

## 2021-06-04 VITALS — BODY MASS INDEX: 25.33 KG/M2 | WEIGHT: 166.6 LBS

## 2021-06-04 VITALS — BODY MASS INDEX: 25.76 KG/M2 | WEIGHT: 169.4 LBS

## 2021-06-04 VITALS — WEIGHT: 164 LBS | BODY MASS INDEX: 24.94 KG/M2

## 2021-06-04 VITALS — BODY MASS INDEX: 25.54 KG/M2 | WEIGHT: 168 LBS

## 2021-06-05 VITALS — WEIGHT: 166.5 LBS | BODY MASS INDEX: 25.32 KG/M2

## 2021-06-05 VITALS — WEIGHT: 168.8 LBS | BODY MASS INDEX: 25.67 KG/M2

## 2021-06-05 VITALS — WEIGHT: 168.1 LBS | BODY MASS INDEX: 25.56 KG/M2

## 2021-06-05 VITALS — WEIGHT: 167.6 LBS | BODY MASS INDEX: 25.48 KG/M2

## 2021-06-08 ENCOUNTER — VIRTUAL VISIT (OUTPATIENT)
Dept: PSYCHOLOGY | Facility: CLINIC | Age: 61
End: 2021-06-08
Payer: COMMERCIAL

## 2021-06-08 DIAGNOSIS — F91.8 CONDUCT DISORDER, UNDIFFERENTIATED TYPE: ICD-10-CM

## 2021-06-08 DIAGNOSIS — F33.0 MILD EPISODE OF RECURRENT MAJOR DEPRESSIVE DISORDER (H): Primary | ICD-10-CM

## 2021-06-08 PROCEDURE — 99207 PR NO CHARGE LOS: CPT | Performed by: PSYCHOLOGIST

## 2021-06-08 PROCEDURE — 90832 PSYTX W PT 30 MINUTES: CPT | Mod: 95 | Performed by: PSYCHOLOGIST

## 2021-06-08 NOTE — ANESTHESIA PROCEDURE NOTES
Central line    Start time: 6/8/2020 7:32 AM  End time: 6/8/2020 7:38 AM  Patient location: OR Post-induction  Indications: central pressure monitoring and vascular access  Performing Anesthesiologist: Yesi Chakraborty MD  Pre-procedure Checklist  Completed: patient identified, site marked, risks, benefits, and alternatives discussed, timeout performed, consent obtained, hand hygiene performed, all elements of maximal sterile barriers used including cap, mask, gown, sterile gloves, and large sheet and skin prep agent completely dried prior to procedure    Procedure Details:  Preparation: 2% chlorhexidine  Location details: right internal jugular  Site selection rationale: access  Catheter type: Introducer with [place]-Victor Manuel  Introducer type: MAC  Lumens:double lumen  Withdrawn and locked at: 53Number of attempts: 1    Ultrasound evaluation of access site: yes   Sterile gel and probe cover used in ultrasound-guided central venous catheter insertion  Vessel patent by US exam  Concurrent real time visualization of needle entry  Visualized anatomic structures normal  Ultrasound permanent image saved  No Pathological Findings  Manometry confirmation of venous access    Post-procedure:   line sutured and Antimicrobial disks with CHG applied  Assessment: blood return through all ports and free fluid flow  Complications: none

## 2021-06-08 NOTE — TELEPHONE ENCOUNTER
[facility] Nurse Triage Telephone Note      Provider: DIANNA De Leon  Facility: [facility]    Facility Type: TCU    Caller: Betty  Call Back Number:  [phone]    Allergies: Penicillins; Cephalexin; Sulfa (sulfonamide antibiotics); and Tetracycline    Reason for call: Nurse reporting that patient has diminished lung sounds in the left upper and lower lung fields.  Right lung is CTA.  Patient says it hurts in the left upper lung field.  No cough or shortness of breath noted.  Patient is afebrile.  O2=96%RA.  Patient is using an IS and has been encouraged to cough and deep breathe.  No nebs or inhalers.  Currently on Lasix 40mg two times a day x 5 days which ends in 2 days, however NP put parameters on the Lasix today to hold if SBP is <110 due to hypotension and dizziness.       Verbal Order/Direction given by Provider: STAT chest x-ray(PA and lateral).      Provider giving order: DIANNA De Leon    Verbal order given to: Betty Perea RN

## 2021-06-08 NOTE — ANESTHESIA POSTPROCEDURE EVALUATION
Patient: Jerad M Vandana  Procedure(s):  CORONARY ARTERY BYPASS GRAFT X3, USING LEFT INTERNAL MAMMARY ARTERY, WITH LEFT LEG ENDOSCOPIC VESSEL PROCUREMENT, ANESTHESIA TRANSESOPHAGEAL ECHOCARDIOGRAM, EPIAORTIC ULTRASOUND, EXPLORED MAIN PULMONARY ARTERY, BILATERAL STERNAL REINFORCMENT WITH KLS PLASTIC STRIPS  Anesthesia type: general    Patient location: ICU  Last vitals:   Vitals Value Taken Time   /69 6/8/2020  6:30 PM   Temp 36.3  C (97.4  F) 6/8/2020  1:31 PM   Pulse 91 6/8/2020  6:39 PM   Resp 16 6/8/2020  1:31 PM   SpO2 94 % 6/8/2020  6:39 PM   Vitals shown include unvalidated device data.  Post vital signs: stable  Level of consciousness: awake  Post-anesthesia pain: pain controlled  Post-anesthesia nausea and vomiting: no  Pulmonary: ETT, ventilator  Cardiovascular: stable  Hydration: adequate  Anesthetic events: no    QCDR Measures:  ASA# 11 - Ximena-op Cardiac Arrest: ASA11B - Patient did NOT experience unanticipated cardiac arrest  ASA# 12 - Ximena-op Mortality Rate: ASA12B - Patient did NOT die  ASA# 13 - PACU Re-Intubation Rate: ASA13X - Exclusion: organ donor or direct ICU transfer  ASA# 10 - Composite Anes Safety: ASA10A - No serious adverse event    Additional Notes: Patient remains vented. Awake. Able to answer yes/no questions. On pressors. ABG pending.

## 2021-06-08 NOTE — ANESTHESIA CARE TRANSFER NOTE
Last vitals:   Vitals:    06/08/20 1331   BP: 118/75   Pulse: 82   Resp: 16   Temp: 36.3  C (97.4  F)   SpO2: 100%     Patient's level of consciousness is drowsy  Spontaneous respirations: no: intubated and sedated  Maintains airway independently: no: on a ventilator and ett  Dentition unchanged: yes  Oropharynx: endotracheal tube in place    QCDR Measures:  ASA# 20 - Surgical Safety Checklist: WHO surgical safety checklist completed prior to induction    PQRS# 430 - Adult PONV Prevention: 4558F-1P - Medical reason for NOT administering combination therapy  ASA# 8 - Peds PONV Prevention: NA - Not pediatric patient, not GA or 2 or more risk factors NOT present  PQRS# 424 - Ximena-op Temp Management: 4559F - At least one body temp DOCUMENTED => 35.5C or 95.9F within required timeframe  PQRS# 426 - PACU Transfer Protocol: - Transfer of care checklist used  ASA# 14 - Acute Post-op Pain: ASA14B - Patient did NOT experience pain >= 7 out of 10       Patient was transported with a monitor to ICU with an ambu bag, he was transferred to a ventilator.  Report was given to RN, VSS stable. Reversals were given.

## 2021-06-08 NOTE — TELEPHONE ENCOUNTER
Called and LM asking Jerad to call back to discuss procedure tomorrow. Still pending COVID results from today. -kcl    Update: Noted pt's test came back negative. Pt called and notified. He is scheduled for procedure at 9:30 today. No further questions. -kcl

## 2021-06-08 NOTE — ANESTHESIA PROCEDURE NOTES
Arterial Line  Reason for Procedure: hemodynamic monitoring  Patient location during procedure: OR pre-induction  Start time: 6/8/2020 7:18 AM  End time: 6/8/2020 7:22 AM  Staffing:  Performing  Anesthesiologist: Yesi Chakraborty MD  Sterile Precautions:  sterile barriers used during insertion: cap, mask, sterile gloves, large sheet, and hand hygiene used.  Arterial Line:     Laterality: left  Location: brachial  Prepped with: ChloroPrep    Needle gauge: 20 G  Number of Attempts: 1  Secured with: tape, transparent dressing and pressure dressing  Flushed with: saline  1% lidocaine local anesthesia used for skin prep.   See MAR for additional medications given.  Ultrasound evaluation of access site: yes  Vessel patent by US exam    Concurrent real time visualization of needle entry    Permanent ultrasound image captured

## 2021-06-08 NOTE — TELEPHONE ENCOUNTER
Dr. Alvarez calling from [facility]. He is requesting a call back from [facility].    See contact phone number in routing comment.

## 2021-06-08 NOTE — TELEPHONE ENCOUNTER
Spoke with Dr PONCE - gave him Dr Carvajal's phone number, as she is on call and I am unable to answer his question.

## 2021-06-08 NOTE — TELEPHONE ENCOUNTER
eJrad BARRAGAN Vandana  [address]  [facility] [address]  [phone] (home)     Primary cardiologist:  Dr. uLndberg   PCP:  Aston Perry MD  Procedure:  Coronary angiogram with possible intervention   H&P completed by:  Dr. Lundberg consult note 5/29  Case MD:  Marce or Chadd   Admit date and time:  6/2 0930  Case start time:  TBD  Ordering MD:  Dr. Lundberg  Diagnosis:  Abnormal stress test   Anticoagulation: None  CPAP: No  Bypass Grafts: No  Renal Issues: No ---- No issues with GFR but hx of kidney transplant   Allergies:   Allergies   Allergen Reactions     Penicillins Hives and Shortness Of Breath     Cephalexin      Sulfa (Sulfonamide Antibiotics)      Tetracycline      Diabetic?: No  Device?: No      Angiogram Teaching    Reason for Visit:  Telephone call to discuss pre-procedure education in preparation for: coronary angiogram     Procedure Prep:  Cardiologist note dated: 5/29  EKG results obtained, dated: on admission   Pertinent test results obtained - Viewable in Epic, dated: 5/28-5/29 recent hospitalization with abnormal stress   Hemogram results obtained: on admission   Basic Metabolic Panel results obtained: on admission   Lipid Profile results obtained: 5/29    Patient Education  Risks to be discussed upon signing consent     Pre-procedure instructions  Patient instructed to be NPO after midnight.  Patient instructed to arrange for transportation home following procedure.  Patient instructed to have a responsible adult with them for 24 hours post-procedure.  Post-procedure follow up process.  Conscious sedation discussed.  The patient was sent the pre-procedure letter (If requested) on n/a- not enough time     Pre-procedure medication instructions  Patient instructed on antiplatelet medication.  Continue medications as scheduled, with a small amount of water on the day of the procedure unless indicated.  Patient instructed to take 325 mg of Aspirin am of procedure: Yes  Other medication:  instructed to take usual medication the  a.m. of the procedure.    *PATIENTS RECORDS AVAILABLE IN [facility] UNLESS OTHERWISE INDICATED*    *Order set was entered on this date: 5/29      Patient Active Problem List   Diagnosis     Chest pain     Chronic viral hepatitis B without delta agent and without coma (H)     Coronary artery disease involving native coronary artery of native heart without angina pectoris     Kidney replaced by transplant     NSTEMI (non-ST elevated myocardial infarction) (H)     HTN (hypertension)     Abnormal cardiovascular stress test       No current facility-administered medications for this visit.      Current Outpatient Medications   Medication Sig Dispense Refill     isosorbide mononitrate (IMDUR) 30 MG 24 hr tablet Take 1 tablet (30 mg total) by mouth daily. 60 tablet 0     nitroglycerin (NITROSTAT) 0.4 MG SL tablet Place 1 tablet (0.4 mg total) under the tongue every 5 (five) minutes as needed for chest pain. 30 tablet 0     Facility-Administered Medications Ordered in Other Visits   Medication Dose Route Frequency Provider Last Rate Last Dose     acetaminophen tablet 325-650 mg (TYLENOL)  325-650 mg Oral Q6H PRN Deandre Aranda MD   650 mg at 05/28/20 1725     amLODIPine tablet 5 mg (NORVASC)  5 mg Oral DAILY Deandre Aranda MD   5 mg at 05/29/20 0851     aspirin EC tablet 81 mg  81 mg Oral DAILY Deandre Aranda MD   81 mg at 05/29/20 0851     atorvastatin tablet 20 mg (LIPITOR)  20 mg Oral QHS Sb Duckworth MD         bisacodyL suppository 10 mg (DULCOLAX)  10 mg Rectal Daily PRN Deandre Aranda MD         clopidogreL tablet 75 mg (PLAVIX)  75 mg Oral DAILY Deandre Aranda MD   75 mg at 05/29/20 0852     entecavir tablet 0.5 mg (BARACLUDE)  0.5 mg Oral DAILY Deander Aranda MD   0.5 mg at 05/29/20 0852     FLUoxetine capsule 40 mg (PROzac)  40 mg Oral DAILY Deandre Aranda MD   40 mg at 05/29/20 0851     [START ON 5/30/2020] isosorbide mononitrate 24 hr tablet 30 mg (IMDUR)  30 mg Oral  DAILY Sb Duckworth MD         latanoprost 0.005 % ophthalmic solution 1 drop (XALATAN)  1 drop Both Eyes QHS Deandre Aranda MD   1 drop at 05/28/20 2005     magnesium hydroxide suspension 30 mL (MILK OF MAG)  30 mL Oral Daily PRN Deandre Aranda MD         mycophenolate capsule 750 mg (CELLCEPT)  750 mg Oral BID Deandre Aranda MD   750 mg at 05/29/20 0853     naloxone injection 0.2-0.4 mg (NARCAN)  0.2-0.4 mg Intravenous PRN Odilon Dumont PA-C        Or     naloxone injection 0.2-0.4 mg (NARCAN)  0.2-0.4 mg Intramuscular PRN Odilon Dumont PA-C         nitroglycerin SL tablet 0.4 mg (NITROSTAT)  0.4 mg Sublingual Q5 Min PRN Deandre Aranda MD         omeprazole capsule 20 mg (PriLOSEC)  20 mg Oral QAM AC Deandre Aranda MD   20 mg at 05/29/20 0851     polyethylene glycol packet 17 g (MIRALAX)  17 g Oral Daily PRN Deandre Aranda MD         predniSONE tablet 5 mg (DELTASONE)  5 mg Oral DAILY Deandre Aranda MD   5 mg at 05/29/20 0851     sodium chloride flush 2.5 mL (NS)  2.5 mL Intravenous Line Care Deandre Aranda MD   2.5 mL at 05/29/20 0853       Allergies   Allergen Reactions     Penicillins Hives and Shortness Of Breath     Cephalexin      Sulfa (Sulfonamide Antibiotics)      Tetracycline        Plan  Pt lives in a group home and does not have a car. His ex-wife or a friend will drive him. Will check in on Monday to discuss who his  will be. COVID swab to be scheduled today- spoke with central scheduling and they will call the patient and get arranged. They close at 7pm tonight.  Patient ready for procedure    RENETTA Ponce-  Can you check in with Jerad to see who his  will be Tuesday? This was a late add on.  Thanks,  Mal

## 2021-06-08 NOTE — PROGRESS NOTES
Pain management [facility]    Facility:   [facility] [[id number]]   Code Status: FULL CODE      CHIEF COMPLAINT/REASON FOR VISIT:  Chief Complaint   Patient presents with     Review Of Multiple Medical Conditions       HISTORY:      HPI: Jerad is a 60 y.o. male undergoing physical and occupational therapy at [facility]. He is with past medical history of  end-stage kidney disease status post bilateral kidney transplant, and known coronary artery disease. He was hospitalized 6/2-6/13 following a MI.  Patient underwent coronary angiogram and following the angiographic finding, patient was referred to CV surgery for evaluation for possible coronary revascularization.   Patient was admitted in the hospital for Plavix washout and on June 8, 2020, following inpatient optimization, patient was taken to the operative room where he underwent coronary artery bypass graft x3, with a full individual grafts; left internal mammary artery to the left anterior descending coronary artery, reverse saphenous vein graft to the lateral branch of right coronary artery, reverse saphenous vein graft to the posterior descending branch of right coronary artery, greater saphenous vein procurement from the left lower extremity using endoscopic vein harvest technique, sternal closure KLKRIS Mayo strips.     Today he is seen for a routine visit to review multiple medical issues and establish care.  He denied chest pain or shortness of breath.  He was noted to have some crackles left lower lobe and chest x-ray was ordered which showed a small effusion  and labs ordered.  he did report having some dizziness and blood pressure low at 104/7010. Patient checked during my visit for reports of some dizziness and he was 101/73 with a pulse of 80.  Parameters  were placed on his Lasix.  He has no lower extremity edema.. Hemoglobin on 612 was 9.8 he did have a low potassium at 3.2 and replacement ordered.  He  continues on sternal precautions.    Past Medical History:   Diagnosis Date     Acute midline low back pain without sciatica      AION (acute ischemic optic neuropathy)      Anemia in chronic renal disease      Avascular necrosis of bones of both hips (H)     s/p bilateral hip replacements     Basal cell carcinoma      Chronic hepatitis B (H)      Clostridium difficile colitis      Coronary artery disease involving native coronary artery of native heart without angina pectoris 06/17/2014    Coronary angiogram 6/17/14: Severe distal 3-vessel disease involving small vessels, not amenable to PCI or CABG     Depression      Diverticulosis      Dyslipidemia      Elevated C-reactive protein (CRP)      FSGS (focal segmental glomerulosclerosis)      Gastric ulcer with hemorrhage 02/12/2012     GERD (gastroesophageal reflux disease)      Glaucoma      Hemorrhoids      HTN (hypertension)      Hyperlipidemia      Hypogonadism in male      Kidney transplanted     focal glomerulosclerosis      NSTEMI (non-ST elevated myocardial infarction) (H) 06/17/2014     JULIANE (obstructive sleep apnea)      Paracentral scotoma     LE     Secondary renal hyperparathyroidism (H)      Septic bursitis      Squamous cell carcinoma              Family History   Problem Relation Age of Onset     Other Father         AI with valve repair     Hypertension Father      Kidney disease Father      Other Maternal Grandfather         cerebrovascular disease     Ovarian cancer Paternal Grandmother      Kidney disease Paternal Aunt      Social History     Socioeconomic History     Marital status:      Spouse name: Not on file     Number of children: Not on file     Years of education: Not on file     Highest education level: Not on file   Occupational History     Not on file   Social Needs     Financial resource strain: Not on file     Food insecurity     Worry: Not on file     Inability: Not on file     Transportation needs     Medical: Not on file      Non-medical: Not on file   Tobacco Use     Smoking status: Former Smoker     Packs/day: 1.00     Years: 9.00     Pack years: 9.00     Last attempt to quit: 1988     Years since quittin.4     Smokeless tobacco: Former User   Substance and Sexual Activity     Alcohol use: Yes     Frequency: Monthly or less     Drug use: Never     Sexual activity: Not on file   Lifestyle     Physical activity     Days per week: Not on file     Minutes per session: Not on file     Stress: Not on file   Relationships     Social connections     Talks on phone: Not on file     Gets together: Not on file     Attends Church service: Not on file     Active member of club or organization: Not on file     Attends meetings of clubs or organizations: Not on file     Relationship status: Not on file     Intimate partner violence     Fear of current or ex partner: Not on file     Emotionally abused: Not on file     Physically abused: Not on file     Forced sexual activity: Not on file   Other Topics Concern     Not on file   Social History Narrative     Not on file         Review of Systems   Constitutional: Positive for fatigue. Negative for activity change, appetite change, chills and fever.   HENT: Negative for congestion and sore throat.    Eyes: Negative for visual disturbance.   Respiratory: Negative for cough, shortness of breath and wheezing.    Cardiovascular: Negative for chest pain and leg swelling.   Gastrointestinal: Negative for abdominal distention, abdominal pain, constipation, diarrhea and nausea.   Genitourinary: Negative for dysuria.   Musculoskeletal: Negative for arthralgias and myalgias.   Skin: Positive for wound. Negative for color change and rash.   Neurological: Positive for dizziness. Negative for weakness and numbness.   Psychiatric/Behavioral: Negative for agitation, behavioral problems and sleep disturbance.       Vitals:    20 0809   BP: 104/72   Pulse: 81   Resp: 21   Temp: 97.8  F (36.6  C)   SpO2:  99%   Weight: 165 lb 3.2 oz (74.9 kg)       Physical Exam  Constitutional:       Appearance: He is well-developed.   HENT:      Head: Normocephalic.   Eyes:      Conjunctiva/sclera: Conjunctivae normal.   Neck:      Musculoskeletal: Normal range of motion.   Cardiovascular:      Rate and Rhythm: Normal rate and regular rhythm.      Heart sounds: Normal heart sounds. No murmur.   Pulmonary:      Effort: No respiratory distress.      Breath sounds: Normal breath sounds. No wheezing or rales.   Abdominal:      General: Bowel sounds are normal. There is no distension.      Palpations: Abdomen is soft.      Tenderness: There is no abdominal tenderness.   Musculoskeletal: Normal range of motion.   Skin:     General: Skin is warm.      Comments: Midline chest incision with CT sites/C/D/I    dermabond incision with bruising left medial leg.    Neurological:      Mental Status: He is alert and oriented to person, place, and time.   Psychiatric:         Behavior: Behavior normal.           LABS:   Recent Results (from the past 240 hour(s))   Hemoglobin   Result Value Ref Range    Hemoglobin 14.2 14.0 - 18.0 g/dL   Platelet Count - DO NOT remove unless platelet count already ordered by other source   Result Value Ref Range    Platelets 340 140 - 440 thou/uL   Potassium - Next AM   Result Value Ref Range    Potassium 4.5 3.5 - 5.0 mmol/L   Echo Monitor RAJ   Result Value Ref Range    BSA 1.96 m2    Hieght 68 in    Weight 2,686.4 lbs    /82 mmHg    HR 75 bpm   POCT i-STAT CG8+, Arterial   Result Value Ref Range    POC pH ART 7.44 7.37 - 7.44    POC pCO2 ART 35.7 35.0 - 45.0 mmHg    POC pO2  (H) 80 - 90 mmHg    POC Base Excess Calc 0 mmol/L    POC TCO2 Calc 25 mmol/L    POC HCO3 ART 24.3 23.0 - 29.0 mmol/L    POC O2 Sat Calc  (H) 96 - 97 %    POC Hemoglobin 14.6 14.0 - 18.0 g/dL    POC Hematocrit 43 40.0 - 54.0 %    iSTAT Glucose 87 70 - 125 mg/dL    POC Ionized Calcium Measured 1.19 1.11 - 1.30 mmol/L    POC  Sodium 135 (L) 136 - 145 mmol/L    POC Potassium 4.1 3.5 - 5.0 mmol/L   Echo Monitor RAJ   Result Value Ref Range    BSA 1.96 m2    Hieght 68 in    Weight 2,686.4 lbs    /82 mmHg    HR 71 bpm   POCT i-STAT CG8+, Arterial   Result Value Ref Range    POC pH ART 7.29 (L) 7.37 - 7.44    POC pCO2 ART 53.3 (H) 35.0 - 45.0 mmHg    POC pO2 ART 55 (L) 80 - 90 mmHg    POC Base Excess Calc -1 mmol/L    POC TCO2 Calc 27 mmol/L    POC HCO3 ART 25.8 23.0 - 29.0 mmol/L    POC O2 Sat Calc ART 84 (LL) 96 - 97 %    POC Hemoglobin 9.9 (L) 14.0 - 18.0 g/dL    POC Hematocrit 29 (L) 40.0 - 54.0 %    iSTAT Glucose 153 (H) 70 - 125 mg/dL    POC Ionized Calcium Measured 1.02 (L) 1.11 - 1.30 mmol/L    POC Sodium 135 (L) 136 - 145 mmol/L    POC Potassium 4.5 3.5 - 5.0 mmol/L   POCT i-STAT CG8+, Arterial   Result Value Ref Range    POC pH ART 7.46 (H) 7.37 - 7.44    POC pCO2 ART 36.7 35.0 - 45.0 mmHg    POC pO2  (H) 80 - 90 mmHg    POC Base Excess Calc 3 mmol/L    POC TCO2 Calc 27 mmol/L    POC HCO3 ART 26.3 23.0 - 29.0 mmol/L    POC O2 Sat Calc  (H) 96 - 97 %    POC Hemoglobin 9.2 (L) 14.0 - 18.0 g/dL    POC Hematocrit 27 (L) 40.0 - 54.0 %    iSTAT Glucose 159 (H) 70 - 125 mg/dL    POC Ionized Calcium Measured 0.99 (L) 1.11 - 1.30 mmol/L    POC Sodium 135 (L) 136 - 145 mmol/L    POC Potassium 4.5 3.5 - 5.0 mmol/L   POCT i-STAT CG8+, Arterial   Result Value Ref Range    POC pH ART 7.32 (L) 7.37 - 7.44    POC pCO2 ART 49.8 (H) 35.0 - 45.0 mmHg    POC pO2  (H) 80 - 90 mmHg    POC Base Excess Calc 0 mmol/L    POC TCO2 Calc 27 mmol/L    POC HCO3 ART 25.6 23.0 - 29.0 mmol/L    POC O2 Sat Calc  (H) 96 - 97 %    POC Hemoglobin 8.8 (L) 14.0 - 18.0 g/dL    POC Hematocrit 26 (L) 40.0 - 54.0 %    iSTAT Glucose 144 (H) 70 - 125 mg/dL    POC Ionized Calcium Measured 0.97 (L) 1.11 - 1.30 mmol/L    POC Sodium 137 136 - 145 mmol/L    POC Potassium 4.7 3.5 - 5.0 mmol/L   HM2(CBC w/o Differential)   Result Value Ref Range     WBC 16.2 (H) 4.0 - 11.0 thou/uL    RBC 2.79 (L) 4.40 - 6.20 mill/uL    Hemoglobin 8.2 (L) 14.0 - 18.0 g/dL    Hematocrit 26.7 (L) 40.0 - 54.0 %    MCV 96 80 - 100 fL    MCH 29.4 27.0 - 34.0 pg    MCHC 30.7 (L) 32.0 - 36.0 g/dL    RDW 14.8 (H) 11.0 - 14.5 %    Platelets 169 140 - 440 thou/uL    MPV 11.3 8.5 - 12.5 fL   INR   Result Value Ref Range    INR 1.66 (H) 0.90 - 1.10   APTT(PTT)   Result Value Ref Range    PTT 56 (H) 24 - 37 seconds   Fibrinogen   Result Value Ref Range    Fibrinogen 284 170 - 410 mg/dL   POCT i-STAT CG8+, Arterial   Result Value Ref Range    POC pH ART 7.28 (L) 7.37 - 7.44    POC pCO2 ART 44.4 35.0 - 45.0 mmHg    POC pO2 ART 71 (L) 80 - 90 mmHg    POC Base Excess Calc -6 mmol/L    POC TCO2 Calc 22 mmol/L    POC HCO3 ART 21.0 (L) 23.0 - 29.0 mmol/L    POC O2 Sat Calc ART 92 (L) 96 - 97 %    POC Hemoglobin 8.8 (L) 14.0 - 18.0 g/dL    POC Hematocrit 26 (L) 40.0 - 54.0 %    iSTAT Glucose 173 (H) 70 - 125 mg/dL    POC Ionized Calcium Measured 1.27 1.11 - 1.30 mmol/L    POC Sodium 138 136 - 145 mmol/L    POC Potassium 4.2 3.5 - 5.0 mmol/L   POCT Glucose    Specimen: Artery; Blood   Result Value Ref Range    Glucose 227 (H) 70 - 139 mg/dL   Basic metabolic panel   Result Value Ref Range    Sodium 141 136 - 145 mmol/L    Potassium 4.0 3.5 - 5.0 mmol/L    Chloride 109 (H) 98 - 107 mmol/L    CO2 21 (L) 22 - 31 mmol/L    Anion Gap, Calculation 11 5 - 18 mmol/L    Glucose 238 (H) 70 - 125 mg/dL    Calcium 7.6 (L) 8.5 - 10.5 mg/dL    BUN 11 8 - 22 mg/dL    Creatinine 0.75 0.70 - 1.30 mg/dL    GFR MDRD Af Amer >60 >60 mL/min/1.73m2    GFR MDRD Non Af Amer >60 >60 mL/min/1.73m2   INR   Result Value Ref Range    INR 1.43 (H) 0.90 - 1.10   Magnesium   Result Value Ref Range    Magnesium 3.2 (H) 1.8 - 2.6 mg/dL   Calcium, Ionized, Measured   Result Value Ref Range    Calcium, Ionized Measured 1.05 (L) 1.11 - 1.30 mmol/L    Calcium, Ionized pH 7.4 1.03 (L) 1.11 - 1.30 mmol/L    pH 7.36 7.35 - 7.45   HM1 (CBC  with Diff)   Result Value Ref Range    WBC 17.3 (H) 4.0 - 11.0 thou/uL    RBC 3.28 (L) 4.40 - 6.20 mill/uL    Hemoglobin 9.5 (L) 14.0 - 18.0 g/dL    Hematocrit 30.2 (L) 40.0 - 54.0 %    MCV 92 80 - 100 fL    MCH 29.0 27.0 - 34.0 pg    MCHC 31.5 (L) 32.0 - 36.0 g/dL    RDW 14.9 (H) 11.0 - 14.5 %    Platelets 191 140 - 440 thou/uL    MPV 10.8 8.5 - 12.5 fL    Neutrophils % 79 (H) 50 - 70 %    Lymphocytes % 8 (L) 20 - 40 %    Monocytes % 12 (H) 2 - 10 %    Eosinophils % 0 0 - 6 %    Basophils % 0 0 - 2 %    Neutrophils Absolute 13.7 (H) 2.0 - 7.7 thou/uL    Lymphocytes Absolute 1.4 0.8 - 4.4 thou/uL    Monocytes Absolute 2.0 (H) 0.0 - 0.9 thou/uL    Eosinophils Absolute 0.0 0.0 - 0.4 thou/uL    Basophils Absolute 0.0 0.0 - 0.2 thou/uL   Blood Gases, Arterial   Result Value Ref Range    pH, Arterial 7.32 (L) 7.37 - 7.44    pCO2, Arterial 37 35 - 45 mm Hg    pO2, Arterial 163 (H) 80 - 90 mm Hg    Bicarbonate, Arterial Calc 19.9 (L) 23.0 - 29.0 mmol/L    O2 Sat, Arterial 100.0 (H) 96.0 - 97.0 %    Oxyhemoglobin 96.8 96.0 - 97.0 %    Base Excess, Arterial Calc -6.4 mmol/L    Ventilation Mode AC     Rate 16 rr/min    FIO2 80.00     Peep 5 cm H2O    Sample Stabilized Temperature 37.0 degrees C    Ventilator Tidal Volume 500 mL   EKG 12 lead   Result Value Ref Range    SYSTOLIC BLOOD PRESSURE      DIASTOLIC BLOOD PRESSURE      VENTRICULAR RATE 97 BPM    ATRIAL RATE 97 BPM    P-R INTERVAL 156 ms    QRS DURATION 134 ms    Q-T INTERVAL 440 ms    QTC CALCULATION (BEZET) 558 ms    P Axis 28 degrees    R AXIS -25 degrees    T AXIS -7 degrees    MUSE DIAGNOSIS       Normal sinus rhythm  Right bundle branch block  Inferior infarct , age undetermined  Anterolateral infarct , age undetermined  Abnormal ECG  When compared with ECG of 28-MAY-2020 12:19,  Significant changes have occurred  Confirmed by BRET KEBEDE MD LOC: ([id number]) on 6/8/2020 3:42:01 PM     POCT Glucose    Specimen: Artery; Blood   Result Value Ref Range    Glucose  220 (H) 70 - 139 mg/dL   POCT Glucose    Specimen: Artery; Blood   Result Value Ref Range    Glucose 218 (H) 70 - 139 mg/dL   POCT Glucose    Specimen: Artery; Blood   Result Value Ref Range    Glucose 224 (H) 70 - 139 mg/dL   Blood Gases, Arterial   Result Value Ref Range    pH, Arterial 7.24 (LL) 7.37 - 7.44    pCO2, Arterial 47 (H) 35 - 45 mm Hg    pO2, Arterial 74 (L) 80 - 90 mm Hg    Bicarbonate, Arterial Calc 19.4 (L) 23.0 - 29.0 mmol/L    O2 Sat, Arterial 97.1 (H) 96.0 - 97.0 %    Oxyhemoglobin 93.9 (L) 96.0 - 97.0 %    Base Excess, Arterial Calc -7.0 mmol/L    Ventilation Mode CPAP/PS     FIO2 40.00     PSV 5 cm H2O    Peep 5 cm H2O    Sample Stabilized Temperature 37.0 degrees C   POCT Glucose    Specimen: Artery; Blood   Result Value Ref Range    Glucose 199 (H) 70 - 139 mg/dL   Blood Gases, Arterial   Result Value Ref Range    pH, Arterial 7.37 7.37 - 7.44    pCO2, Arterial 36 35 - 45 mm Hg    pO2, Arterial 84 80 - 90 mm Hg    Bicarbonate, Arterial Calc 21.8 (L) 23.0 - 29.0 mmol/L    O2 Sat, Arterial 98.5 (H) 96.0 - 97.0 %    Oxyhemoglobin 95.7 (L) 96.0 - 97.0 %    Base Excess, Arterial Calc -4.0 mmol/L    Ventilation Mode AC     Rate 20 rr/min    FIO2 40.00     Peep 5 cm H2O    Sample Stabilized Temperature 37.0 degrees C    Ventilator Tidal Volume 500 mL   POCT Glucose    Specimen: Artery; Blood   Result Value Ref Range    Glucose 211 (H) 70 - 139 mg/dL   POCT Glucose    Specimen: Artery; Blood   Result Value Ref Range    Glucose 179 (H) 70 - 139 mg/dL   POCT Glucose    Specimen: Artery; Blood   Result Value Ref Range    Glucose 161 (H) 70 - 139 mg/dL   Blood Gases, Arterial post extubation   Result Value Ref Range    pH, Arterial 7.29 (L) 7.37 - 7.44    pCO2, Arterial 55 (H) 35 - 45 mm Hg    pO2, Arterial 71 (L) 80 - 90 mm Hg    Bicarbonate, Arterial Calc 24.5 23.0 - 29.0 mmol/L    O2 Sat, Arterial 96.3 96.0 - 97.0 %    Oxyhemoglobin 93.6 (L) 96.0 - 97.0 %    Base Excess, Arterial Calc -0.5 mmol/L     Ventilation Mode Nasal cannula     Flow 3.0 LPM    Sample Stabilized Temperature 37.0 degrees C   POCT Glucose    Specimen: Artery; Blood   Result Value Ref Range    Glucose 107 70 - 139 mg/dL   POCT Glucose    Specimen: Artery; Blood   Result Value Ref Range    Glucose 96 70 - 139 mg/dL   POCT Glucose    Specimen: Artery; Blood   Result Value Ref Range    Glucose 95 70 - 139 mg/dL   POCT Glucose    Specimen: Artery; Blood   Result Value Ref Range    Glucose 132 70 - 139 mg/dL   POCT Glucose    Specimen: Artery; Blood   Result Value Ref Range    Glucose 121 70 - 139 mg/dL   POCT Glucose    Specimen: Artery; Blood   Result Value Ref Range    Glucose 115 70 - 139 mg/dL   POCT Glucose    Specimen: Artery; Blood   Result Value Ref Range    Glucose 132 70 - 139 mg/dL   Basic Metabolic Panel   Result Value Ref Range    Sodium 137 136 - 145 mmol/L    Potassium 4.5 3.5 - 5.0 mmol/L    Chloride 105 98 - 107 mmol/L    CO2 23 22 - 31 mmol/L    Anion Gap, Calculation 9 5 - 18 mmol/L    Glucose 122 70 - 125 mg/dL    Calcium 7.8 (L) 8.5 - 10.5 mg/dL    BUN 13 8 - 22 mg/dL    Creatinine 0.90 0.70 - 1.30 mg/dL    GFR MDRD Af Amer >60 >60 mL/min/1.73m2    GFR MDRD Non Af Amer >60 >60 mL/min/1.73m2   INR   Result Value Ref Range    INR 1.36 (H) 0.90 - 1.10   Magnesium   Result Value Ref Range    Magnesium 2.5 1.8 - 2.6 mg/dL   Calcium, Ionized, Measured   Result Value Ref Range    Calcium, Ionized Measured 1.09 (L) 1.11 - 1.30 mmol/L    Calcium, Ionized pH 7.4 1.06 (L) 1.11 - 1.30 mmol/L    pH 7.33 (L) 7.35 - 7.45   HM1 (CBC with Diff)   Result Value Ref Range    WBC 13.5 (H) 4.0 - 11.0 thou/uL    RBC 2.68 (L) 4.40 - 6.20 mill/uL    Hemoglobin 7.8 (L) 14.0 - 18.0 g/dL    Hematocrit 25.6 (L) 40.0 - 54.0 %    MCV 96 80 - 100 fL    MCH 29.1 27.0 - 34.0 pg    MCHC 30.5 (L) 32.0 - 36.0 g/dL    RDW 15.4 (H) 11.0 - 14.5 %    Platelets 182 140 - 440 thou/uL    MPV 11.2 8.5 - 12.5 fL    Neutrophils % 76 (H) 50 - 70 %    Lymphocytes % 9 (L) 20  - 40 %    Monocytes % 14 (H) 2 - 10 %    Eosinophils % 0 0 - 6 %    Basophils % 0 0 - 2 %    Neutrophils Absolute 10.3 (H) 2.0 - 7.7 thou/uL    Lymphocytes Absolute 1.2 0.8 - 4.4 thou/uL    Monocytes Absolute 1.9 (H) 0.0 - 0.9 thou/uL    Eosinophils Absolute 0.0 0.0 - 0.4 thou/uL    Basophils Absolute 0.0 0.0 - 0.2 thou/uL   POCT Glucose    Specimen: Blood   Result Value Ref Range    Glucose 117 70 - 139 mg/dL   POCT Glucose    Specimen: Artery; Blood   Result Value Ref Range    Glucose 106 70 - 139 mg/dL   POCT Glucose    Specimen: Artery; Blood   Result Value Ref Range    Glucose 124 70 - 139 mg/dL   Blood Gases, Arterial   Result Value Ref Range    pH, Arterial 7.33 (L) 7.37 - 7.44    pCO2, Arterial 44 35 - 45 mm Hg    pO2, Arterial 66 (L) 80 - 90 mm Hg    Bicarbonate, Arterial Calc 22.9 (L) 23.0 - 29.0 mmol/L    O2 Sat, Arterial 96.2 96.0 - 97.0 %    Oxyhemoglobin 93.1 (L) 96.0 - 97.0 %    Base Excess, Arterial Calc -2.6 mmol/L    Ventilation Mode Nasal cannula     Flow 2.0 LPM    Sample Stabilized Temperature 37.0 degrees C   POCT Glucose    Specimen: Artery; Blood   Result Value Ref Range    Glucose 139 70 - 139 mg/dL   POCT Glucose    Specimen: Artery; Blood   Result Value Ref Range    Glucose 89 70 - 139 mg/dL   POCT Glucose    Specimen: Artery; Blood   Result Value Ref Range    Glucose 117 70 - 139 mg/dL   POCT Glucose    Specimen: Blood   Result Value Ref Range    Glucose 117 70 - 139 mg/dL   POCT Glucose    Specimen: Blood   Result Value Ref Range    Glucose 106 70 - 139 mg/dL   Hemoglobin   Result Value Ref Range    Hemoglobin 6.6 (LL) 14.0 - 18.0 g/dL   Platelet Count - DO NOT remove unless platelet count already ordered by other source   Result Value Ref Range    Platelets 134 (L) 140 - 440 thou/uL   Basic Metabolic Panel   Result Value Ref Range    Sodium 133 (L) 136 - 145 mmol/L    Potassium 4.3 3.5 - 5.0 mmol/L    Chloride 99 98 - 107 mmol/L    CO2 26 22 - 31 mmol/L    Anion Gap, Calculation 8 5 -  18 mmol/L    Glucose 92 70 - 125 mg/dL    Calcium 8.1 (L) 8.5 - 10.5 mg/dL    BUN 19 8 - 22 mg/dL    Creatinine 0.85 0.70 - 1.30 mg/dL    GFR MDRD Af Amer >60 >60 mL/min/1.73m2    GFR MDRD Non Af Amer >60 >60 mL/min/1.73m2   Magnesium   Result Value Ref Range    Magnesium 2.3 1.8 - 2.6 mg/dL   Platelet Product Information   Result Value Ref Range    Status Canceled     Component Platelets    POCT Glucose    Specimen: Blood   Result Value Ref Range    Glucose 96 70 - 139 mg/dL   POCT Glucose    Specimen: Blood   Result Value Ref Range    Glucose 105 70 - 139 mg/dL   POCT Glucose    Specimen: Blood   Result Value Ref Range    Glucose 95 70 - 139 mg/dL   POCT Glucose    Specimen: Blood   Result Value Ref Range    Glucose 87 70 - 139 mg/dL   Crossmatch   Result Value Ref Range    Crossmatch Compatible     Unit Type O Pos     Unit Number [id number]     Status Released     Component Red Blood Cells     PRODUCT CODE P5993O97     Blood Type 5100     CODING SYSTEM [id number]    Crossmatch   Result Value Ref Range    Crossmatch Compatible     Unit Type O Pos     Unit Number [id number]     Status Transfused     Component Red Blood Cells     PRODUCT CODE P7890O88     Issue Date and Time [id number]     Blood Type 5100     CODING SYSTEM [id number]    Crossmatch   Result Value Ref Range    Crossmatch Compatible     Unit Type O Pos     Unit Number [id number]     Status Transfused     Component Red Blood Cells     PRODUCT CODE [id number]     Issue Date and Time [id number]     Blood Type 5100     CODING SYSTEM [id number]    Basic Metabolic Panel   Result Value Ref Range    Sodium 134 (L) 136 - 145 mmol/L    Potassium 3.0 (L) 3.5 - 5.0 mmol/L    Chloride 96 (L) 98 - 107 mmol/L    CO2 27 22 - 31 mmol/L    Anion Gap, Calculation 11 5 - 18 mmol/L    Glucose 71 70 - 125 mg/dL    Calcium 8.4 (L) 8.5 - 10.5 mg/dL    BUN 20 8 - 22 mg/dL    Creatinine 0.83 0.70 - 1.30 mg/dL    GFR MDRD Af Amer >60 >60 mL/min/1.73m2    GFR MDRD Non Af  Amer >60 >60 mL/min/1.73m2   Hemoglobin   Result Value Ref Range    Hemoglobin 10.0 (L) 14.0 - 18.0 g/dL   Magnesium   Result Value Ref Range    Magnesium 1.9 1.8 - 2.6 mg/dL   POCT Glucose    Specimen: Blood   Result Value Ref Range    Glucose 76 70 - 139 mg/dL   POCT Glucose    Specimen: Blood   Result Value Ref Range    Glucose 97 70 - 139 mg/dL   Magnesium   Result Value Ref Range    Magnesium 2.4 1.8 - 2.6 mg/dL   Potassium   Result Value Ref Range    Potassium 3.8 3.5 - 5.0 mmol/L   POCT Glucose    Specimen: Blood   Result Value Ref Range    Glucose 89 70 - 139 mg/dL   POCT Glucose    Specimen: Blood   Result Value Ref Range    Glucose 96 70 - 139 mg/dL   Hemoglobin   Result Value Ref Range    Hemoglobin 9.8 (L) 14.0 - 18.0 g/dL   Platelet Count - DO NOT remove unless platelet count already ordered by other source   Result Value Ref Range    Platelets 200 140 - 440 thou/uL   Basic Metabolic Panel   Result Value Ref Range    Sodium 136 136 - 145 mmol/L    Potassium 3.7 3.5 - 5.0 mmol/L    Chloride 96 (L) 98 - 107 mmol/L    CO2 32 (H) 22 - 31 mmol/L    Anion Gap, Calculation 8 5 - 18 mmol/L    Glucose 94 70 - 125 mg/dL    Calcium 8.6 8.5 - 10.5 mg/dL    BUN 18 8 - 22 mg/dL    Creatinine 0.88 0.70 - 1.30 mg/dL    GFR MDRD Af Amer >60 >60 mL/min/1.73m2    GFR MDRD Non Af Amer >60 >60 mL/min/1.73m2   Magnesium   Result Value Ref Range    Magnesium 2.2 1.8 - 2.6 mg/dL   POCT Glucose    Specimen: Blood   Result Value Ref Range    Glucose 92 70 - 139 mg/dL   POCT Glucose    Specimen: Blood   Result Value Ref Range    Glucose 136 70 - 139 mg/dL   POCT Glucose    Specimen: Blood   Result Value Ref Range    Glucose 132 70 - 139 mg/dL   POCT Glucose    Specimen: Blood   Result Value Ref Range    Glucose 124 70 - 139 mg/dL   Basic Metabolic Panel   Result Value Ref Range    Sodium 137 136 - 145 mmol/L    Potassium 3.2 (L) 3.5 - 5.0 mmol/L    Chloride 96 (L) 98 - 107 mmol/L    CO2 31 22 - 31 mmol/L    Anion Gap, Calculation  10 5 - 18 mmol/L    Glucose 83 70 - 125 mg/dL    Calcium 8.8 8.5 - 10.5 mg/dL    BUN 17 8 - 22 mg/dL    Creatinine 0.76 0.70 - 1.30 mg/dL    GFR MDRD Af Amer >60 >60 mL/min/1.73m2    GFR MDRD Non Af Amer >60 >60 mL/min/1.73m2   Magnesium   Result Value Ref Range    Magnesium 1.9 1.8 - 2.6 mg/dL   POCT Glucose    Specimen: Blood   Result Value Ref Range    Glucose 86 70 - 139 mg/dL   POCT Glucose    Specimen: Blood   Result Value Ref Range    Glucose 144 (H) 70 - 139 mg/dL   HM2(CBC w/o Differential)   Result Value Ref Range    WBC 10.9 4.0 - 11.0 thou/uL    RBC 4.45 4.40 - 6.20 mill/uL    Hemoglobin 12.7 (L) 14.0 - 18.0 g/dL    Hematocrit 40.4 40.0 - 54.0 %    MCV 91 80 - 100 fL    MCH 28.5 27.0 - 34.0 pg    MCHC 31.4 (L) 32.0 - 36.0 g/dL    RDW 15.6 (H) 11.0 - 14.5 %    Platelets 524 (H) 140 - 440 thou/uL    MPV 10.2 8.5 - 12.5 fL   Basic Metabolic Panel   Result Value Ref Range    Sodium 134 (L) 136 - 145 mmol/L    Potassium 4.1 3.5 - 5.0 mmol/L    Chloride 97 (L) 98 - 107 mmol/L    CO2 28 22 - 31 mmol/L    Anion Gap, Calculation 9 5 - 18 mmol/L    Glucose 106 70 - 125 mg/dL    Calcium 9.6 8.5 - 10.5 mg/dL    BUN 24 (H) 8 - 22 mg/dL    Creatinine 0.96 0.70 - 1.30 mg/dL    GFR MDRD Af Amer >60 >60 mL/min/1.73m2    GFR MDRD Non Af Amer >60 >60 mL/min/1.73m2     Current Outpatient Medications   Medication Sig     acetaminophen (TYLENOL) 325 MG tablet Take 2 tablets (650 mg total) by mouth every 4 (four) hours as needed for pain or fever.     aspirin 81 MG EC tablet Take 81 mg by mouth daily.     calcium citrate-vitamin D (CITRACAL+D) 315-200 mg-unit per tablet Take 1 tablet by mouth daily.     clopidogrel (PLAVIX) 75 mg tablet Take 75 mg by mouth daily.     docusate sodium (COLACE) 100 MG capsule Take 1 capsule (100 mg total) by mouth daily.     entecavir (BARACLUDE) 0.5 MG tablet Take 0.5 mg by mouth daily.     FLUoxetine (PROZAC) 10 MG tablet Take 10 mg by mouth daily. Take with 1 tablet with the 40mg to make it  50mg daily.     FLUoxetine (PROZAC) 40 MG capsule Take 40 mg by mouth daily.     furosemide (LASIX) 40 MG tablet Take 1 tablet (40 mg total) by mouth 2 (two) times a day at 9am and 6pm for 5 days.     latanoprost (XALATAN) 0.005 % ophthalmic solution Administer 1 drop to both eyes at bedtime.     magnesium oxide (MAG-OX) 400 mg (241.3 mg magnesium) tablet Take 1 tablet (400 mg total) by mouth daily for 5 days.     metoprolol tartrate (LOPRESSOR) 25 MG tablet Take 0.5 tablets (12.5 mg total) by mouth 2 (two) times a day.     mycophenolate (CELLCEPT) 250 mg capsule Take 750 mg by mouth 2 (two) times a day.     nitroglycerin (NITROSTAT) 0.4 MG SL tablet Place 1 tablet (0.4 mg total) under the tongue every 5 (five) minutes as needed for chest pain.     OMEGA-3 FATTY ACIDS ORAL Take 1 capsule by mouth daily.     omeprazole (PRILOSEC OTC) 20 MG tablet Take 20 mg by mouth daily before breakfast.     ONE DAILY MULTIVITAMIN ORAL Take 1 tablet by mouth daily.     polyethylene glycol (MIRALAX) 17 gram packet Take 1 packet (17 g total) by mouth daily as needed (Constipation).     predniSONE (DELTASONE) 5 MG tablet Take 5 mg by mouth daily.     rosuvastatin (CRESTOR) 20 MG tablet Take 1 tablet (20 mg total) by mouth at bedtime.     traMADoL (ULTRAM) 50 mg tablet Take 1 tablet (50 mg total) by mouth every 6 (six) hours as needed for pain.     ASSESSMENT:      ICD-10-CM    1. NSTEMI (non-ST elevated myocardial infarction) (H)  I21.4    2. End stage renal disease (H)  N18.6    3. Chronic viral hepatitis B without delta agent and without coma (H)  B18.1    4. Essential hypertension  I10    5. Pain management  R52        PLAN:    Crackles eft lower lobe/fatigue,  dry cough.  chest x-ray,  labs and COVID test currently with low blood pressure 104/72 parameters placed on ACE ordered    Non-STEMI status post triple bypass PT OT    End-stage renal disease patient with history of bilateral kidney transplant, on CellCept    Chronic viral  hepatitis B on entecavir     Hypertension on metoprolol tartrate 12.5 mg twice daily  BP currently soft at 104/72.       physical debility PT OT    Pain management PRN regular strength Tylenol and tramadol as needed        Electronically signed by: Joaquina Donald CNP

## 2021-06-08 NOTE — ANESTHESIA PROCEDURE NOTES
RAJ    Patient location during procedure: OR  Start time: 6/8/2020 7:55 AM  Staffing:  Performing  Anesthesiologist: Yesi Chakraborty MD  RAJ:  Type/Reason: Monitoring RAJ  Technique: blind insertion  Difficulty: easy  Anesthesia Monitoring: see additional note

## 2021-06-08 NOTE — TELEPHONE ENCOUNTER
Received a call directly from Dr. Lundberg. He is rounding on this patient and requesting outpatient coronary angiogram with possible percutaneous intervention dx: abnormal stress test. Pt would like to discharge as opposed to staying over the weekend. Case request placed.         Spoke with Jerad directly in the hospital prior to discharge. He reports that he is agreeable to angiogram tomorrow and he understands that since he is discharging, he will need to be COVID swabbed again. Order populated for COVID swab to be arranged.

## 2021-06-08 NOTE — ANESTHESIA PREPROCEDURE EVALUATION
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     There is a medium sized area of moderate ischemia in the inferior and inferolateral segment(s) of the left ventricle.     The left ventricular ejection fraction at stress is 68%.     The patient is at an intermediate risk of future cardiac ischemic events.     There is no prior study for comparison.     Left ventricular function is normal.      Angio    Angina in patient on immusuppressants for renal transplant.    Ectatic left main with ectasia extending into the proximal LAD and circumflex    Diffuse, severe coronary atherosclerosis with severe stenoses in the distal half of the vasculature    LV EDP low    Estimated blood loss was <20 ml.    echo    Left Ventricle: Normal left ventricular size.The calculated left ventricular ejection fraction is 54%. This represents a mildly decreased ejection fraction. The left ventricular wall thickness is normal. E/e' < 8, suggesting normal LV filling pressure.    The left ventricular wall motion is normal.    Right Ventricle: Normal right ventricular systolic function. In some views the right ventricle appears to be mildly enlarged TAPSE is normal, which is consistent with normal right ventricular systolic function.    Thoracic Aorta: The aortic root is mildly dilated. The  ascending aorta is mildly dilated.    No previous study for comparison.           Anesthesia Plan  Planned anesthetic: general endotracheal  Slightly higher risk of anesthesia awareness compared to general population discussed with patient.    Induction:  Slow controlled induction    Monitors:  Standard ASA, Arterial line, PAC, Transesophageal echo for monitoring    Lines: Central line with PAC, PIVx2, arterial line    Labs:  Reviewed.  Type and screen done    Postop:  D/w patient about routine post op ventillator support with sedation  ASA 4   Induction: intravenous   Anesthetic plan and risks discussed with: patient  Anesthesia plan special considerations: antiemetics, CVP line, arterial catheterization, pulmonary artery catheterization, IV therapy two IVs, dexmedetomidine  Post-op plan: extended intubation/vent support and routine recovery

## 2021-06-09 NOTE — PATIENT INSTRUCTIONS - HE
Mr. Ross,  Thank you for taking my video call today.  I am glad to hear you are getting along so well, I am glad to see driving restrictions and lifting restrictions have been released.  Hopefully if you so desire you can get back to work.  Keep tabs on your blood pressure as we discussed, we may discuss discontinuing metoprolol when I see you in November.  We will continue the Plavix for the time being but also talked about stopping that November possibly.  Any questions or issues in the interim please let me know.  Stay safe.  Sascha Lundberg

## 2021-06-09 NOTE — PROGRESS NOTES
Pain management [facility]    Facility:   [facility] [[id number]]   Code Status: FULL CODE      CHIEF COMPLAINT/REASON FOR VISIT:  Chief Complaint   Patient presents with     Review Of Multiple Medical Conditions     F/U triple bypass/pain management       HISTORY:      HPI: Jerad is a 60 y.o. male undergoing physical and occupational therapy at [facility]. He is with past medical history of  end-stage kidney disease status post bilateral kidney transplant, and known coronary artery disease. He was hospitalized 6/2-6/13 following a MI.  Patient underwent coronary angiogram and following the angiographic finding, patient was referred to CV surgery for evaluation for possible coronary revascularization.   Patient was admitted in the hospital for Plavix washout and on June 8, 2020, following inpatient optimization, patient was taken to the operative room where he underwent coronary artery bypass graft x3, with a full individual grafts; left internal mammary artery to the left anterior descending coronary artery, reverse saphenous vein graft to the lateral branch of right coronary artery, reverse saphenous vein graft to the posterior descending branch of right coronary artery, greater saphenous vein procurement from the left lower extremity using endoscopic vein harvest technique, sternal closure KLS Gael strips.     Today he is seen for follow-up to bypass surgery and review of pain.  Today he tells me he started feeling better as of last Friday. BP soft at 96/68 but he denied dizziness.  His hemoglobin has come up to 12.7 WBC 10.9 and his potassium has increased from 3.2-4.1.  He denied chest pain or shortness of breath.  He reports he is urinating well.   He has no lower extremity edema.. He denied constipation or diarrhea he continues on sternal precautions.He tells me his pain is controlled with just tylenol. He feels he is making progress in therapy.     Past Medical  History:   Diagnosis Date     Acute midline low back pain without sciatica      AION (acute ischemic optic neuropathy)      Anemia in chronic renal disease      Avascular necrosis of bones of both hips (H)     s/p bilateral hip replacements     Basal cell carcinoma      Chronic hepatitis B (H)      Clostridium difficile colitis      Coronary artery disease involving native coronary artery of native heart without angina pectoris 06/17/2014    Coronary angiogram 6/17/14: Severe distal 3-vessel disease involving small vessels, not amenable to PCI or CABG     Depression      Diverticulosis      Dyslipidemia      Elevated C-reactive protein (CRP)      FSGS (focal segmental glomerulosclerosis)      Gastric ulcer with hemorrhage 02/12/2012     GERD (gastroesophageal reflux disease)      Glaucoma      Hemorrhoids      HTN (hypertension)      Hyperlipidemia      Hypogonadism in male      Kidney transplanted     focal glomerulosclerosis      NSTEMI (non-ST elevated myocardial infarction) (H) 06/17/2014     JULIANE (obstructive sleep apnea)      Paracentral scotoma     LE     Secondary renal hyperparathyroidism (H)      Septic bursitis      Squamous cell carcinoma              Family History   Problem Relation Age of Onset     Other Father         AI with valve repair     Hypertension Father      Kidney disease Father      Other Maternal Grandfather         cerebrovascular disease     Ovarian cancer Paternal Grandmother      Kidney disease Paternal Aunt      Social History     Socioeconomic History     Marital status:      Spouse name: Not on file     Number of children: Not on file     Years of education: Not on file     Highest education level: Not on file   Occupational History     Not on file   Social Needs     Financial resource strain: Not on file     Food insecurity     Worry: Not on file     Inability: Not on file     Transportation needs     Medical: Not on file     Non-medical: Not on file   Tobacco Use      Smoking status: Former Smoker     Packs/day: 1.00     Years: 9.00     Pack years: 9.00     Last attempt to quit: 1988     Years since quittin.4     Smokeless tobacco: Former User   Substance and Sexual Activity     Alcohol use: Yes     Frequency: Monthly or less     Drug use: Never     Sexual activity: Not on file   Lifestyle     Physical activity     Days per week: Not on file     Minutes per session: Not on file     Stress: Not on file   Relationships     Social connections     Talks on phone: Not on file     Gets together: Not on file     Attends [employer] service: Not on file     Active member of club or organization: Not on file     Attends meetings of clubs or organizations: Not on file     Relationship status: Not on file     Intimate partner violence     Fear of current or ex partner: Not on file     Emotionally abused: Not on file     Physically abused: Not on file     Forced sexual activity: Not on file   Other Topics Concern     Not on file   Social History Narrative     Not on file         Review of Systems   Constitutional: Positive for fatigue. Negative for activity change, appetite change, chills and fever.   HENT: Negative for congestion and sore throat.    Eyes: Negative for visual disturbance.   Respiratory: Negative for cough, shortness of breath and wheezing.    Cardiovascular: Negative for chest pain and leg swelling.   Gastrointestinal: Negative for abdominal distention, abdominal pain, constipation, diarrhea and nausea.   Genitourinary: Negative for dysuria.   Musculoskeletal: Negative for arthralgias and myalgias.   Skin: Positive for wound. Negative for color change and rash.   Neurological: Negative for weakness and numbness.   Psychiatric/Behavioral: Negative for agitation, behavioral problems and sleep disturbance.       Vitals:    20 2046   BP: 96/68   Resp: 16   Temp: 98  F (36.7  C)   SpO2: 96%   Weight: 162 lb 9.6 oz (73.8 kg)       Physical Exam  Constitutional:        Appearance: He is well-developed.   HENT:      Head: Normocephalic.   Eyes:      Conjunctiva/sclera: Conjunctivae normal.   Neck:      Musculoskeletal: Normal range of motion.   Cardiovascular:      Rate and Rhythm: Normal rate and regular rhythm.      Heart sounds: Normal heart sounds. No murmur.   Pulmonary:      Effort: No respiratory distress.      Breath sounds: No wheezing.   Abdominal:      General: Bowel sounds are normal. There is no distension.      Palpations: Abdomen is soft.      Tenderness: There is no abdominal tenderness.   Musculoskeletal: Normal range of motion.   Skin:     General: Skin is warm.      Comments: Midline chest incision with CT sites/C/D/I    dermabond incision with bruising left medial leg.    Neurological:      Mental Status: He is alert and oriented to person, place, and time.   Psychiatric:         Behavior: Behavior normal.           LABS:   Recent Results (from the past 240 hour(s))   HM2(CBC w/o Differential)   Result Value Ref Range    WBC 10.9 4.0 - 11.0 thou/uL    RBC 4.45 4.40 - 6.20 mill/uL    Hemoglobin 12.7 (L) 14.0 - 18.0 g/dL    Hematocrit 40.4 40.0 - 54.0 %    MCV 91 80 - 100 fL    MCH 28.5 27.0 - 34.0 pg    MCHC 31.4 (L) 32.0 - 36.0 g/dL    RDW 15.6 (H) 11.0 - 14.5 %    Platelets 524 (H) 140 - 440 thou/uL    MPV 10.2 8.5 - 12.5 fL   Basic Metabolic Panel   Result Value Ref Range    Sodium 134 (L) 136 - 145 mmol/L    Potassium 4.1 3.5 - 5.0 mmol/L    Chloride 97 (L) 98 - 107 mmol/L    CO2 28 22 - 31 mmol/L    Anion Gap, Calculation 9 5 - 18 mmol/L    Glucose 106 70 - 125 mg/dL    Calcium 9.6 8.5 - 10.5 mg/dL    BUN 24 (H) 8 - 22 mg/dL    Creatinine 0.96 0.70 - 1.30 mg/dL    GFR MDRD Af Amer >60 >60 mL/min/1.73m2    GFR MDRD Non Af Amer >60 >60 mL/min/1.73m2     Current Outpatient Medications   Medication Sig     acetaminophen (TYLENOL) 325 MG tablet Take 2 tablets (650 mg total) by mouth every 4 (four) hours as needed for pain or fever.     aspirin 81 MG EC tablet  Take 81 mg by mouth daily.     calcium citrate-vitamin D (CITRACAL+D) 315-200 mg-unit per tablet Take 1 tablet by mouth daily.     clopidogrel (PLAVIX) 75 mg tablet Take 75 mg by mouth daily.     docusate sodium (COLACE) 100 MG capsule Take 1 capsule (100 mg total) by mouth daily.     entecavir (BARACLUDE) 0.5 MG tablet Take 0.5 mg by mouth daily.     FLUoxetine (PROZAC) 10 MG tablet Take 10 mg by mouth daily. Take with 1 tablet with the 40mg to make it 50mg daily.     FLUoxetine (PROZAC) 40 MG capsule Take 40 mg by mouth daily.     furosemide (LASIX) 40 MG tablet Take 1 tablet (40 mg total) by mouth 2 (two) times a day at 9am and 6pm for 5 days.     latanoprost (XALATAN) 0.005 % ophthalmic solution Administer 1 drop to both eyes at bedtime.     metoprolol tartrate (LOPRESSOR) 25 MG tablet Take 0.5 tablets (12.5 mg total) by mouth 2 (two) times a day.     mycophenolate (CELLCEPT) 250 mg capsule Take 750 mg by mouth 2 (two) times a day.     nitroglycerin (NITROSTAT) 0.4 MG SL tablet Place 1 tablet (0.4 mg total) under the tongue every 5 (five) minutes as needed for chest pain.     OMEGA-3 FATTY ACIDS ORAL Take 1 capsule by mouth daily.     omeprazole (PRILOSEC OTC) 20 MG tablet Take 20 mg by mouth daily before breakfast.     ONE DAILY MULTIVITAMIN ORAL Take 1 tablet by mouth daily.     polyethylene glycol (MIRALAX) 17 gram packet Take 1 packet (17 g total) by mouth daily as needed (Constipation).     predniSONE (DELTASONE) 5 MG tablet Take 5 mg by mouth daily.     rosuvastatin (CRESTOR) 20 MG tablet Take 1 tablet (20 mg total) by mouth at bedtime.     traMADoL (ULTRAM) 50 mg tablet Take 1 tablet (50 mg total) by mouth every 6 (six) hours as needed for pain.     ASSESSMENT:      ICD-10-CM    1. NSTEMI (non-ST elevated myocardial infarction) (H)  I21.4    2. Essential hypertension  I10    3. Pain management  R52        PLAN:   Fatigue Hemoglobin improved to 12.7    Non-STEMI status post triple bypass PT OT    End-stage  renal disease patient with history of bilateral kidney transplant, on CellCept    Chronic viral hepatitis B on entecavir     Hypertension on metoprolol tartrate 12.5 mg twice daily  BP currently soft at 96/68.       physical debility PT OT    Pain management PRN regular strength Tylenol and tramadol as needed    Hypokalemia Patient recently replaced and potassium now 4.1    Electronically signed by: Joaquina Donald CNP

## 2021-06-09 NOTE — PROGRESS NOTES
Medical Care for Seniors/ Geriatrics    Facility:  [facility] [[id number]]    Code Status:  FULL CODE    Chief Complaint   Patient presents with     Review Of Multiple Medical Conditions   :                    Patient Active Problem List   Diagnosis     Chest pain     Chronic viral hepatitis B without delta agent and without coma (H)     Coronary artery disease involving native coronary artery of native heart without angina pectoris     S/P kidney transplant     NSTEMI (non-ST elevated myocardial infarction) (H)     HTN (hypertension)     Abnormal cardiovascular stress test     Unstable angina (H)     CAD (coronary artery disease)     End stage renal disease with bilateral renal transplant       History:  Jerad Ross  is a 60 year old male with history of end-stage renal disease, status post bilateral renal transplants (glomerular sclerosis), secondary hyperparathyroidism, chronic immunosuppression and steroid dependence, chronic hepatitis B, GERD, gastric ulcer/GI bleed, glaucoma, coronary artery disease including non-STEMI in 2014 and recent CABG, dyslipidemia, colectomy due to diverticulosis/diverticulitis, bilateral total hip arthroplasties, bilateral hip arthroplasty revision, splenectomy, appendectomy, tonsillectomy, cataracts, history of C. difficile infection seen for review of his multiple medical issues on 6/26/2020    Hospital Course: Patient was admitted on June 2 has discharged on June 13.  He presented with unstable angina/acute MI.  Angiogram revealed severe multivessel artery disease with recommendation for bypass procedure performed by Dr. Arora on June 8 with Dr. Lundberg following from cardiology.  Patient recuperated in the ICU was extubated on arrival required phenylephrine and Lasix drips but did quite well with removal of chest tubes by postop day #3.    TCU was recommended for strengthening/rehabilitation      Facility course:  Patient's initial days at the TCU were complicated  by hypotension leading to early discontinuation of his furosemide, as needed holding of metoprolol.  This recovered spontaneously with time and better p.o. intake.    His initial chest pain continue to improve with tapering of the tramadol to scheduled acetaminophen.    Patient tolerated his medication program well    Patient participated well with PT OT,  involved.    Subjective/ROS:    -augmented by discussion with facility staff involved in direct care    Patient says that he is had a very good week since I seen him.  His pain has continued to diminish.  His ability to exercise has increased.  He notes that prior to surgery he often felt a sense of palpitations and he says he has had none of that since the surgery.  Furthermore his chest pain is improved.  He can still feel with certain movements or cough.  He has had no leg swelling.  His appetite is coming back really quite nicely.  His bowels are working as is his bladder.  No falls or injuries no new problems.    He has follow-up July 21 for an appointment and June 30 for an appointment the near 1 is with Krupa Rodriguez.    Remainder ROS negative    Past Medical History:   Diagnosis Date     Acute midline low back pain without sciatica      AION (acute ischemic optic neuropathy)      Anemia in chronic renal disease      Avascular necrosis of bones of both hips (H)     s/p bilateral hip replacements     Basal cell carcinoma      Chronic hepatitis B (H)      Clostridium difficile colitis      Coronary artery disease involving native coronary artery of native heart without angina pectoris 06/17/2014    Coronary angiogram 6/17/14: Severe distal 3-vessel disease involving small vessels, not amenable to PCI or CABG     Depression      Diverticulosis      Dyslipidemia      Elevated C-reactive protein (CRP)      FSGS (focal segmental glomerulosclerosis)      Gastric ulcer with hemorrhage 02/12/2012     GERD (gastroesophageal reflux disease)       Glaucoma      Hemorrhoids      HTN (hypertension)      Hyperlipidemia      Hypogonadism in male      Kidney transplanted     focal glomerulosclerosis      NSTEMI (non-ST elevated myocardial infarction) (H) 06/17/2014     JULIANE (obstructive sleep apnea)      Paracentral scotoma     LE     Secondary renal hyperparathyroidism (H)      Septic bursitis      Squamous cell carcinoma      Past Surgical History:   Procedure Laterality Date     APPENDECTOMY       CATARACT EXTRACTION EXTRACAPSULAR W/ INTRAOCULAR LENS IMPLANTATION Bilateral 4-20-10, 6-1-10     CATARACT EXTRACTION W/  INTRAOCULAR LENS IMPLANT Right 04/19/2000     CATARACT EXTRACTION W/  INTRAOCULAR LENS IMPLANT Left 06/01/2000     COLONOSCOPY  02/13/2012     CV CORONARY ANGIOGRAM N/A 6/2/2020    Procedure: Coronary Angiogram;  Surgeon: Alona Florentino MD;  Location: [facility] Cath Lab;  Service: Cardiology     CV LEFT HEART CATHETERIZATION WO LEFT VETRICULOGRAM Left 6/2/2020    Procedure: Left Heart Catheterization Without Left Ventriculogram;  Surgeon: Alona Florentino MD;  Location: [facility] Cath Lab;  Service: Cardiology     ESOPHAGOGASTRODUODENOSCOPY  02/13/2012     HERNIA REPAIR  1995    Lt inguinal     HIP SURGERY  1981    bilat MITZI, revised 2001, 2005     KIDNEY SURGERY  1978, 1993    transplant     kidney transplant      1978, 1993     KNEE SURGERY Bilateral 1983, 1987     MOHS SURGERY       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, auxiliary spleen     SUBTOTAL COLECTOMY  1983     10 cm, diverticulitis      TONSILLECTOMY            Family History   Problem Relation Age of Onset     Other Father         AI with valve repair     Hypertension Father      Kidney disease Father      Other Maternal Grandfather         cerebrovascular disease     Ovarian cancer Paternal Grandmother      Kidney disease Paternal Aunt    :       Social History     Socioeconomic History     Marital status:      Spouse name: Not on file     Number of children: Not on file      Years of education: Not on file     Highest education level: Not on file   Occupational History     Not on file   Social Needs     Financial resource strain: Not on file     Food insecurity     Worry: Not on file     Inability: Not on file     Transportation needs     Medical: Not on file     Non-medical: Not on file   Tobacco Use     Smoking status: Former Smoker     Packs/day: 1.00     Years: 9.00     Pack years: 9.00     Last attempt to quit: 1988     Years since quittin.5     Smokeless tobacco: Former User   Substance and Sexual Activity     Alcohol use: Yes     Frequency: Monthly or less     Drug use: Never     Sexual activity: Not on file   Lifestyle     Physical activity     Days per week: Not on file     Minutes per session: Not on file     Stress: Not on file   Relationships     Social connections     Talks on phone: Not on file     Gets together: Not on file     Attends Yarsanism service: Not on file     Active member of club or organization: Not on file     Attends meetings of clubs or organizations: Not on file     Relationship status: Not on file     Intimate partner violence     Fear of current or ex partner: Not on file     Emotionally abused: Not on file     Physically abused: Not on file     Forced sexual activity: Not on file   Other Topics Concern     Not on file   Social History Narrative     Not on file   :    Social history: Patient says he is living in a group home situation in the [facility] and has onsite support there upon discharge.    Current Outpatient Medications on File Prior to Visit   Medication Sig Dispense Refill     acetaminophen (TYLENOL) 325 MG tablet Take 2 tablets (650 mg total) by mouth every 4 (four) hours as needed for pain or fever. 20 tablet 0     aspirin 81 MG EC tablet Take 81 mg by mouth daily.       calcium citrate-vitamin D (CITRACAL+D) 315-200 mg-unit per tablet Take 1 tablet by mouth daily.       clopidogrel (PLAVIX) 75 mg tablet Take 75 mg by mouth  daily.       docusate sodium (COLACE) 100 MG capsule Take 1 capsule (100 mg total) by mouth daily. 30 capsule 0     entecavir (BARACLUDE) 0.5 MG tablet Take 0.5 mg by mouth daily.       FLUoxetine (PROZAC) 10 MG tablet Take 10 mg by mouth daily. Take with 1 tablet with the 40mg to make it 50mg daily.       FLUoxetine (PROZAC) 40 MG capsule Take 40 mg by mouth daily.       furosemide (LASIX) 40 MG tablet Take 1 tablet (40 mg total) by mouth 2 (two) times a day at 9am and 6pm for 5 days. 5 tablet 0     latanoprost (XALATAN) 0.005 % ophthalmic solution Administer 1 drop to both eyes at bedtime.       metoprolol tartrate (LOPRESSOR) 25 MG tablet Take 0.5 tablets (12.5 mg total) by mouth 2 (two) times a day. 30 tablet 0     mycophenolate (CELLCEPT) 250 mg capsule Take 750 mg by mouth 2 (two) times a day.       nitroglycerin (NITROSTAT) 0.4 MG SL tablet Place 1 tablet (0.4 mg total) under the tongue every 5 (five) minutes as needed for chest pain. 30 tablet 0     OMEGA-3 FATTY ACIDS ORAL Take 1 capsule by mouth daily.       omeprazole (PRILOSEC OTC) 20 MG tablet Take 20 mg by mouth daily before breakfast.       ONE DAILY MULTIVITAMIN ORAL Take 1 tablet by mouth daily.       polyethylene glycol (MIRALAX) 17 gram packet Take 1 packet (17 g total) by mouth daily as needed (Constipation). 20 packet 0     predniSONE (DELTASONE) 5 MG tablet Take 5 mg by mouth daily.       rosuvastatin (CRESTOR) 20 MG tablet Take 1 tablet (20 mg total) by mouth at bedtime. 30 tablet 3     traMADoL (ULTRAM) 50 mg tablet Take 1 tablet (50 mg total) by mouth every 6 (six) hours as needed for pain. 18 tablet 0     No current facility-administered medications on file prior to visit.    :      ALLERGIES:  Penicillins; Cephalexin; Sulfa (sulfonamide antibiotics); and Tetracycline    Vitals:      Current Vitals   BP: 120/72 mmHg  6/26/2020 07:17  Temp:98.3  F  6/26/2020 07:17  Pulse: 65 bpm  6/26/2020 07:17  Weight: 163.3 Lbs  6/26/2020 11:29  Resp: 16  Breaths/min  6/26/2020 07:17  BS:  O2: 95 %  6/26/2020 07:17  Pain: 0  6/26/2020 07:47  Weight is stable since I saw him last week.  5 pounds down from admit weight.  There is no height or weight on file to calculate BMI.    Physical exam:    General:  Alert  oriented x3 appears in no acute distress    He is up and about walking without assistance today.  He is pleasant normally conversant normocephalic/atraumatic with good range of motion of his neck.  Gait appears stable in the short distance he walks inside his room.  He is able to use both arms and hands productively retrieving paperwork etc.  He has a bandage on his left shin which she says he picked the lesion off of.  He says he is got many keratoses which he will sometimes pick and bleed.  He has no edema.  Linear bruising pattern on the left ankle fading.    Due to the 2020 Covid 19 pandemic, except as noted above, the patient was visually observed at a 6 foot plus distance.  An observational exam was performed in an effort to keep patient safe from Covid 19 and other communicable diseases.   Labs:  Lab Results   Component Value Date    WBC 6.9 06/26/2020    HGB 11.9 (L) 06/26/2020    HCT 39.4 (L) 06/26/2020    MCV 95 06/26/2020     (H) 06/26/2020     Results for orders placed or performed in visit on 06/26/20   Basic Metabolic Panel   Result Value Ref Range    Sodium 140 136 - 145 mmol/L    Potassium 3.6 3.5 - 5.0 mmol/L    Chloride 104 98 - 107 mmol/L    CO2 24 22 - 31 mmol/L    Anion Gap, Calculation 12 5 - 18 mmol/L    Glucose 88 70 - 125 mg/dL    Calcium 9.5 8.5 - 10.5 mg/dL    BUN 10 8 - 22 mg/dL    Creatinine 0.71 0.70 - 1.30 mg/dL    GFR MDRD Af Amer >60 >60 mL/min/1.73m2    GFR MDRD Non Af Amer >60 >60 mL/min/1.73m2         No results found for: TSH  Lab Results   Component Value Date    HGBA1C 5.6 06/03/2020     [unfilled]  No results found for: [id number]  No results found for: BNP  [unfilled]  Most Recent EKG     Units 06/08/20  8282    VENTRATE BPM 97   ATRIALRATE BPM 97   QRSDURATION ms 134   QTINTERVAL ms 440   QTCCALC ms 558   P [place] degrees 28   RAXIS degrees -25   TAXIS degrees -7   MUSEDX  Normal sinus rhythm  Right bundle branch block  Inferior infarct , age undetermined  Anterolateral infarct , age undetermined  Abnormal ECG  When compared with ECG of 28-MAY-2020 12:19,  Significant changes have occurred  Confirmed by BRET KEBEDE MD LOC: ([id number]) on 6/8/2020 3:42:01 PM         Assessment/Plan:      ICD-10-CM    1. End stage renal disease with bilateral renal transplant  N18.6    2. Chronic viral hepatitis B without delta agent and without coma (H)  B18.1    3. Coronary artery disease involving native coronary artery of native heart without angina pectoris  I25.10    4. NSTEMI (non-ST elevated myocardial infarction) (H)  I21.4    5. Essential hypertension  I10        Coronary artery disease  CABG x3 June 8, 2020   VIEYRA to LAD   R SVG to PL   R SVG to PDA  EVH from LLE  History of non-STEMI 2014, 2020   Patient is quite satisfied with his improvement over the last week as a.m.  His wounds continue to heal well both by his report and by staff report.  His post sternotomy pain is improving nicely and is getting by with Tylenol nicely although he still has access to tramadol.    - Dual antiplatelets aspirin plus Plavix in place at this time  - Crestor 20 mg daily  - Lasix has been discontinued  - Doing a good job tapering to plain acetaminophen.  Anticipate that he will not need tramadol on discharge  - Metoprolol 12.5 mg twice daily.  Blood pressure has improved since I saw him.  -Minimal postop anemia 12.7 not requiring treatment, spontaneous resolution anticipated    Bilateral renal transplant due to glomerulosclerosis   -Chronic immunosuppression  Chronic steroid dependence  Secondary hyperparathyroidism  Secondary osteoporosis, presumed  Splenectomy   Nothing new to report here.  Patient's kidney function held up well.  GFR estimated  greater than 60 on 6/16/2020 with a creatinine 0.96  -Avoid nephrotoxins  -Patient is not on directed osteoporosis therapy.  He thinks he might of been on Fosamax at one point.  Unclear if he has been lost to follow-up?  I strongly encouraged him to follow-up with his transplant clinic with a question for what if anything he should be doing for bone health.  Continue calcium and vitamin D in the meanwhile  -Patient reports that splenectomy was done at the time of his transplant and felt to be an important part of the success at that time in history.  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    GERD  History of gastric ulcer  History of upper GI bleed   Continue omeprazole.  Patient is asymptomatic at this time    Dyslipidemia   Crestor as above    Glaucoma   No change in his latanoprost    Subtotal colectomy   Patient's GI function is returning toward his normal.    JULIANE   Untreated.  Patient says he had CPAP in the past but does not use it.    Low blood pressures   Blood pressures have improved.  He is tolerating the metoprolol well.    Chronic hepatitis B   Entecavir anticipated lifelong    Disposition: Patient anticipates discharge likely late next week.        discussed with facility staff      Geronimo Strickland MD

## 2021-06-09 NOTE — PROGRESS NOTES
Review of systems are positive for lightheadedness. All other review of systems are within normal limits.

## 2021-06-09 NOTE — PROGRESS NOTES
Cardiac Rehab  Phase II Assessment    Assessment Date: 7/10/2020    Diagnosis: CAD  Date of Onset: 2014  Procedure: CABG x 3  Date of Onset: 6/8/20  ICD/Pacemaker: No Parameters: NA  Post-op Complications: none  ECG History: SR, BBB EF%:68, per stress test 5/29/20  Past Medical History:  has a past medical history of Acute midline low back pain without sciatica, AION (acute ischemic optic neuropathy), Anemia in chronic renal disease, Avascular necrosis of bones of both hips (H), Basal cell carcinoma, Chronic hepatitis B (H), Clostridium difficile colitis, Coronary artery disease involving native coronary artery of native heart without angina pectoris (06/17/2014), Depression, Diverticulosis, Dyslipidemia, Elevated C-reactive protein (CRP), FSGS (focal segmental glomerulosclerosis), Gastric ulcer with hemorrhage (02/12/2012), GERD (gastroesophageal reflux disease), Glaucoma, Hemorrhoids, HTN (hypertension), Hyperlipidemia, Hypogonadism in male, Kidney transplanted, NSTEMI (non-ST elevated myocardial infarction) (H) (06/17/2014), JULIANE (obstructive sleep apnea), Paracentral scotoma, Secondary renal hyperparathyroidism (H), Septic bursitis, and Squamous cell carcinoma.; s/p kidney transplant x 2 (1978, 1993); B hip replacement; B knee replacement; no DM; smoker- quit 32 yrs ago; mild asthma    Past Surgical History:   Procedure Laterality Date     APPENDECTOMY       CATARACT EXTRACTION EXTRACAPSULAR W/ INTRAOCULAR LENS IMPLANTATION Bilateral 4-20-10, 6-1-10     CATARACT EXTRACTION W/  INTRAOCULAR LENS IMPLANT Right 04/19/2000     CATARACT EXTRACTION W/  INTRAOCULAR LENS IMPLANT Left 06/01/2000     COLONOSCOPY  02/13/2012     CV CORONARY ANGIOGRAM N/A 6/2/2020    Procedure: Coronary Angiogram;  Surgeon: Alona Florentino MD;  Location: [facility] Cath Lab;  Service: Cardiology     CV LEFT HEART CATHETERIZATION WO LEFT VETRICULOGRAM Left 6/2/2020    Procedure: Left Heart Catheterization Without Left Ventriculogram;   Surgeon: Alona Florentino MD;  Location: [facility];  Service: Cardiology     ESOPHAGOGASTRODUODENOSCOPY  02/13/2012     HERNIA REPAIR  1995    Lt inguinal     HIP SURGERY  1981    bilat MITZI, revised 2001, 2005     KIDNEY SURGERY  1978, 1993    transplant     kidney transplant      1978, 1993     KNEE SURGERY Bilateral 1983, 1987     MOHS SURGERY       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, auxiliary spleen     SUBTOTAL COLECTOMY  1983     10 cm, diverticulitis      TONSILLECTOMY         Physical Assessment  Precautions/ Physical Limitations: Surgical  Oxygen: No  O2 Sats: 98 Lung Sounds: clear Edema: none  Incisions: clear/dry/intact  Sleeping Pattern: good   Appetite: fair   Nutrition Risk Screen: Completed.    Pain  Location: incisional  Characteristics:pain  Intensity: (0-10 scale) 4  Current Pain Management: Tylenol  Intervention: Tylenol  Response: decreases to 0-2/10    Psychosocial/ Emotional Health  1. In the past 12 months, have you been in a relationship where you have been abused physically, emotionally, sexually or financially? No  notified: NA  2. Who do you turn to for emotional support?: wife, roommates  3. Do you have cultural or spiritual needs? No  4. Have there been any major life changes in the past 12 months? No    Referral Information  Primary Physician: Aston Perry MD; [facility]  Cardiologist: Dr. Sascha Lundberg  Surgeon: Dr. Dasia Arora    Home exercise/Equipment: none    Patient's long-term goal(s): Resume pre-surgery activities, establish home ex habit    1. Living Accommodations: Home Steps: Yes      Support people at home: yes, roommates   2. Marital Status:  (currently )  3. Family is able to assist with cares      [employer]/Community involvement: none  4. Recreation/Hobbies: walking

## 2021-06-09 NOTE — PROGRESS NOTES
Pain management [facility]    Facility:   [facility] [[id number]]   Code Status: FULL CODE      CHIEF COMPLAINT/REASON FOR VISIT:  Chief Complaint   Patient presents with     Problem Visit     monitor labs       HISTORY:      HPI: Jerad is a 60 y.o. male undergoing physical and occupational therapy at [facility]. He is with past medical history of  end-stage kidney disease status post bilateral kidney transplant, and known coronary artery disease. He was hospitalized 6/2-6/13 following a MI.  Patient underwent coronary angiogram and following the angiographic finding, patient was referred to CV surgery for evaluation for possible coronary revascularization.   Patient was admitted in the hospital for Plavix washout and on June 8, 2020, following inpatient optimization, patient was taken to the operative room where he underwent coronary artery bypass graft x3, with a full individual grafts; left internal mammary artery to the left anterior descending coronary artery, reverse saphenous vein graft to the lateral branch of right coronary artery, reverse saphenous vein graft to the posterior descending branch of right coronary artery, greater saphenous vein procurement from the left lower extremity using endoscopic vein harvest technique, sternal closure KLS Gael strips.     Today he is seen for follow-up to dizziness and to review labs.  Today he tells me he is feeling much better.  His blood pressure is still on the softer side 112/77.  His hemoglobin has come up to 12.7 WBC 10.9 and his potassium has increased from 3.2-4.1.  He denied chest pain or shortness of breath.  He reports he is urinating well his weights were reviewed and he is down approximately 5 pounds.  His Covid test is still pending.  He has no lower extremity edema.. He denied constipation or diarrhea he continues on sternal precautions.    Past Medical History:   Diagnosis Date     Acute midline low back pain  without sciatica      AION (acute ischemic optic neuropathy)      Anemia in chronic renal disease      Avascular necrosis of bones of both hips (H)     s/p bilateral hip replacements     Basal cell carcinoma      Chronic hepatitis B (H)      Clostridium difficile colitis      Coronary artery disease involving native coronary artery of native heart without angina pectoris 06/17/2014    Coronary angiogram 6/17/14: Severe distal 3-vessel disease involving small vessels, not amenable to PCI or CABG     Depression      Diverticulosis      Dyslipidemia      Elevated C-reactive protein (CRP)      FSGS (focal segmental glomerulosclerosis)      Gastric ulcer with hemorrhage 02/12/2012     GERD (gastroesophageal reflux disease)      Glaucoma      Hemorrhoids      HTN (hypertension)      Hyperlipidemia      Hypogonadism in male      Kidney transplanted     focal glomerulosclerosis      NSTEMI (non-ST elevated myocardial infarction) (H) 06/17/2014     JULIANE (obstructive sleep apnea)      Paracentral scotoma     LE     Secondary renal hyperparathyroidism (H)      Septic bursitis      Squamous cell carcinoma              Family History   Problem Relation Age of Onset     Other Father         AI with valve repair     Hypertension Father      Kidney disease Father      Other Maternal Grandfather         cerebrovascular disease     Ovarian cancer Paternal Grandmother      Kidney disease Paternal Aunt      Social History     Socioeconomic History     Marital status:      Spouse name: Not on file     Number of children: Not on file     Years of education: Not on file     Highest education level: Not on file   Occupational History     Not on file   Social Needs     Financial resource strain: Not on file     Food insecurity     Worry: Not on file     Inability: Not on file     Transportation needs     Medical: Not on file     Non-medical: Not on file   Tobacco Use     Smoking status: Former Smoker     Packs/day: 1.00     Years:  9.00     Pack years: 9.00     Last attempt to quit: 1988     Years since quittin.4     Smokeless tobacco: Former User   Substance and Sexual Activity     Alcohol use: Yes     Frequency: Monthly or less     Drug use: Never     Sexual activity: Not on file   Lifestyle     Physical activity     Days per week: Not on file     Minutes per session: Not on file     Stress: Not on file   Relationships     Social connections     Talks on phone: Not on file     Gets together: Not on file     Attends Evangelical service: Not on file     Active member of club or organization: Not on file     Attends meetings of clubs or organizations: Not on file     Relationship status: Not on file     Intimate partner violence     Fear of current or ex partner: Not on file     Emotionally abused: Not on file     Physically abused: Not on file     Forced sexual activity: Not on file   Other Topics Concern     Not on file   Social History Narrative     Not on file         Review of Systems   Constitutional: Positive for fatigue. Negative for activity change, appetite change, chills and fever.   HENT: Negative for congestion and sore throat.    Eyes: Negative for visual disturbance.   Respiratory: Negative for cough, shortness of breath and wheezing.    Cardiovascular: Negative for chest pain and leg swelling.   Gastrointestinal: Negative for abdominal distention, abdominal pain, constipation, diarrhea and nausea.   Genitourinary: Negative for dysuria.   Musculoskeletal: Negative for arthralgias and myalgias.   Skin: Positive for wound. Negative for color change and rash.   Neurological: Negative for weakness and numbness.   Psychiatric/Behavioral: Negative for agitation, behavioral problems and sleep disturbance.       Vitals:    20 0643   BP: 112/77   Pulse: 71   Resp: 14   Temp: 97.3  F (36.3  C)   SpO2: 97%   Weight: 163 lb 9.6 oz (74.2 kg)       Physical Exam  Constitutional:       Appearance: He is well-developed.   HENT:       Head: Normocephalic.   Eyes:      Conjunctiva/sclera: Conjunctivae normal.   Neck:      Musculoskeletal: Normal range of motion.   Cardiovascular:      Rate and Rhythm: Normal rate and regular rhythm.      Heart sounds: Normal heart sounds. No murmur.   Pulmonary:      Effort: No respiratory distress.      Breath sounds: Rales present. No wheezing.      Comments: Crackles  left lower lobe  Abdominal:      General: Bowel sounds are normal. There is no distension.      Palpations: Abdomen is soft.      Tenderness: There is no abdominal tenderness.   Musculoskeletal: Normal range of motion.   Skin:     General: Skin is warm.      Comments: Midline chest incision with CT sites/C/D/I    dermabond incision with bruising left medial leg.    Neurological:      Mental Status: He is alert and oriented to person, place, and time.      Coordination: Coordination abnormal.   Psychiatric:         Behavior: Behavior normal.           LABS:   Recent Results (from the past 240 hour(s))   POCT Glucose    Specimen: Blood   Result Value Ref Range    Glucose 117 70 - 139 mg/dL   POCT Glucose    Specimen: Blood   Result Value Ref Range    Glucose 106 70 - 139 mg/dL   Hemoglobin   Result Value Ref Range    Hemoglobin 6.6 (LL) 14.0 - 18.0 g/dL   Platelet Count - DO NOT remove unless platelet count already ordered by other source   Result Value Ref Range    Platelets 134 (L) 140 - 440 thou/uL   Basic Metabolic Panel   Result Value Ref Range    Sodium 133 (L) 136 - 145 mmol/L    Potassium 4.3 3.5 - 5.0 mmol/L    Chloride 99 98 - 107 mmol/L    CO2 26 22 - 31 mmol/L    Anion Gap, Calculation 8 5 - 18 mmol/L    Glucose 92 70 - 125 mg/dL    Calcium 8.1 (L) 8.5 - 10.5 mg/dL    BUN 19 8 - 22 mg/dL    Creatinine 0.85 0.70 - 1.30 mg/dL    GFR MDRD Af Amer >60 >60 mL/min/1.73m2    GFR MDRD Non Af Amer >60 >60 mL/min/1.73m2   Magnesium   Result Value Ref Range    Magnesium 2.3 1.8 - 2.6 mg/dL   Platelet Product Information   Result Value Ref Range     Status Canceled     Component Platelets    POCT Glucose    Specimen: Blood   Result Value Ref Range    Glucose 96 70 - 139 mg/dL   POCT Glucose    Specimen: Blood   Result Value Ref Range    Glucose 105 70 - 139 mg/dL   POCT Glucose    Specimen: Blood   Result Value Ref Range    Glucose 95 70 - 139 mg/dL   POCT Glucose    Specimen: Blood   Result Value Ref Range    Glucose 87 70 - 139 mg/dL   Crossmatch   Result Value Ref Range    Crossmatch Compatible     Unit Type O Pos     Unit Number [id number]     Status Released     Component Red Blood Cells     PRODUCT CODE P3956O12     Blood Type 5100     CODING SYSTEM [id number]    Crossmatch   Result Value Ref Range    Crossmatch Compatible     Unit Type O Pos     Unit Number [id number]     Status Transfused     Component Red Blood Cells     PRODUCT CODE I8523V45     Issue Date and Time [id number]     Blood Type 5100     CODING SYSTEM [id number]    Crossmatch   Result Value Ref Range    Crossmatch Compatible     Unit Type O Pos     Unit Number [id number]     Status Transfused     Component Red Blood Cells     PRODUCT CODE [id number]     Issue Date and Time [id number]     Blood Type 5100     CODING SYSTEM [id number]    Basic Metabolic Panel   Result Value Ref Range    Sodium 134 (L) 136 - 145 mmol/L    Potassium 3.0 (L) 3.5 - 5.0 mmol/L    Chloride 96 (L) 98 - 107 mmol/L    CO2 27 22 - 31 mmol/L    Anion Gap, Calculation 11 5 - 18 mmol/L    Glucose 71 70 - 125 mg/dL    Calcium 8.4 (L) 8.5 - 10.5 mg/dL    BUN 20 8 - 22 mg/dL    Creatinine 0.83 0.70 - 1.30 mg/dL    GFR MDRD Af Amer >60 >60 mL/min/1.73m2    GFR MDRD Non Af Amer >60 >60 mL/min/1.73m2   Hemoglobin   Result Value Ref Range    Hemoglobin 10.0 (L) 14.0 - 18.0 g/dL   Magnesium   Result Value Ref Range    Magnesium 1.9 1.8 - 2.6 mg/dL   POCT Glucose    Specimen: Blood   Result Value Ref Range    Glucose 76 70 - 139 mg/dL   POCT Glucose    Specimen: Blood   Result Value Ref Range    Glucose 97 70 - 139 mg/dL    Magnesium   Result Value Ref Range    Magnesium 2.4 1.8 - 2.6 mg/dL   Potassium   Result Value Ref Range    Potassium 3.8 3.5 - 5.0 mmol/L   POCT Glucose    Specimen: Blood   Result Value Ref Range    Glucose 89 70 - 139 mg/dL   POCT Glucose    Specimen: Blood   Result Value Ref Range    Glucose 96 70 - 139 mg/dL   Hemoglobin   Result Value Ref Range    Hemoglobin 9.8 (L) 14.0 - 18.0 g/dL   Platelet Count - DO NOT remove unless platelet count already ordered by other source   Result Value Ref Range    Platelets 200 140 - 440 thou/uL   Basic Metabolic Panel   Result Value Ref Range    Sodium 136 136 - 145 mmol/L    Potassium 3.7 3.5 - 5.0 mmol/L    Chloride 96 (L) 98 - 107 mmol/L    CO2 32 (H) 22 - 31 mmol/L    Anion Gap, Calculation 8 5 - 18 mmol/L    Glucose 94 70 - 125 mg/dL    Calcium 8.6 8.5 - 10.5 mg/dL    BUN 18 8 - 22 mg/dL    Creatinine 0.88 0.70 - 1.30 mg/dL    GFR MDRD Af Amer >60 >60 mL/min/1.73m2    GFR MDRD Non Af Amer >60 >60 mL/min/1.73m2   Magnesium   Result Value Ref Range    Magnesium 2.2 1.8 - 2.6 mg/dL   POCT Glucose    Specimen: Blood   Result Value Ref Range    Glucose 92 70 - 139 mg/dL   POCT Glucose    Specimen: Blood   Result Value Ref Range    Glucose 136 70 - 139 mg/dL   POCT Glucose    Specimen: Blood   Result Value Ref Range    Glucose 132 70 - 139 mg/dL   POCT Glucose    Specimen: Blood   Result Value Ref Range    Glucose 124 70 - 139 mg/dL   Basic Metabolic Panel   Result Value Ref Range    Sodium 137 136 - 145 mmol/L    Potassium 3.2 (L) 3.5 - 5.0 mmol/L    Chloride 96 (L) 98 - 107 mmol/L    CO2 31 22 - 31 mmol/L    Anion Gap, Calculation 10 5 - 18 mmol/L    Glucose 83 70 - 125 mg/dL    Calcium 8.8 8.5 - 10.5 mg/dL    BUN 17 8 - 22 mg/dL    Creatinine 0.76 0.70 - 1.30 mg/dL    GFR MDRD Af Amer >60 >60 mL/min/1.73m2    GFR MDRD Non Af Amer >60 >60 mL/min/1.73m2   Magnesium   Result Value Ref Range    Magnesium 1.9 1.8 - 2.6 mg/dL   POCT Glucose    Specimen: Blood   Result Value Ref  Range    Glucose 86 70 - 139 mg/dL   POCT Glucose    Specimen: Blood   Result Value Ref Range    Glucose 144 (H) 70 - 139 mg/dL   HM2(CBC w/o Differential)   Result Value Ref Range    WBC 10.9 4.0 - 11.0 thou/uL    RBC 4.45 4.40 - 6.20 mill/uL    Hemoglobin 12.7 (L) 14.0 - 18.0 g/dL    Hematocrit 40.4 40.0 - 54.0 %    MCV 91 80 - 100 fL    MCH 28.5 27.0 - 34.0 pg    MCHC 31.4 (L) 32.0 - 36.0 g/dL    RDW 15.6 (H) 11.0 - 14.5 %    Platelets 524 (H) 140 - 440 thou/uL    MPV 10.2 8.5 - 12.5 fL   Basic Metabolic Panel   Result Value Ref Range    Sodium 134 (L) 136 - 145 mmol/L    Potassium 4.1 3.5 - 5.0 mmol/L    Chloride 97 (L) 98 - 107 mmol/L    CO2 28 22 - 31 mmol/L    Anion Gap, Calculation 9 5 - 18 mmol/L    Glucose 106 70 - 125 mg/dL    Calcium 9.6 8.5 - 10.5 mg/dL    BUN 24 (H) 8 - 22 mg/dL    Creatinine 0.96 0.70 - 1.30 mg/dL    GFR MDRD Af Amer >60 >60 mL/min/1.73m2    GFR MDRD Non Af Amer >60 >60 mL/min/1.73m2     Current Outpatient Medications   Medication Sig     acetaminophen (TYLENOL) 325 MG tablet Take 2 tablets (650 mg total) by mouth every 4 (four) hours as needed for pain or fever.     aspirin 81 MG EC tablet Take 81 mg by mouth daily.     calcium citrate-vitamin D (CITRACAL+D) 315-200 mg-unit per tablet Take 1 tablet by mouth daily.     clopidogrel (PLAVIX) 75 mg tablet Take 75 mg by mouth daily.     docusate sodium (COLACE) 100 MG capsule Take 1 capsule (100 mg total) by mouth daily.     entecavir (BARACLUDE) 0.5 MG tablet Take 0.5 mg by mouth daily.     FLUoxetine (PROZAC) 10 MG tablet Take 10 mg by mouth daily. Take with 1 tablet with the 40mg to make it 50mg daily.     FLUoxetine (PROZAC) 40 MG capsule Take 40 mg by mouth daily.     furosemide (LASIX) 40 MG tablet Take 1 tablet (40 mg total) by mouth 2 (two) times a day at 9am and 6pm for 5 days.     latanoprost (XALATAN) 0.005 % ophthalmic solution Administer 1 drop to both eyes at bedtime.     magnesium oxide (MAG-OX) 400 mg (241.3 mg magnesium)  tablet Take 1 tablet (400 mg total) by mouth daily for 5 days.     metoprolol tartrate (LOPRESSOR) 25 MG tablet Take 0.5 tablets (12.5 mg total) by mouth 2 (two) times a day.     mycophenolate (CELLCEPT) 250 mg capsule Take 750 mg by mouth 2 (two) times a day.     nitroglycerin (NITROSTAT) 0.4 MG SL tablet Place 1 tablet (0.4 mg total) under the tongue every 5 (five) minutes as needed for chest pain.     OMEGA-3 FATTY ACIDS ORAL Take 1 capsule by mouth daily.     omeprazole (PRILOSEC OTC) 20 MG tablet Take 20 mg by mouth daily before breakfast.     ONE DAILY MULTIVITAMIN ORAL Take 1 tablet by mouth daily.     polyethylene glycol (MIRALAX) 17 gram packet Take 1 packet (17 g total) by mouth daily as needed (Constipation).     predniSONE (DELTASONE) 5 MG tablet Take 5 mg by mouth daily.     rosuvastatin (CRESTOR) 20 MG tablet Take 1 tablet (20 mg total) by mouth at bedtime.     traMADoL (ULTRAM) 50 mg tablet Take 1 tablet (50 mg total) by mouth every 6 (six) hours as needed for pain.     ASSESSMENT:      ICD-10-CM    1. Essential hypertension  I10    2. NSTEMI (non-ST elevated myocardial infarction) (H)  I21.4    3. Pain management  R52    4. Debility  R53.81        PLAN:   Fatigue Hemoglobin improved to 12.7    Non-STEMI status post triple bypass PT OT    End-stage renal disease patient with history of bilateral kidney transplant, on CellCept    Chronic viral hepatitis B on entecavir     Hypertension on metoprolol tartrate 12.5 mg twice daily  BP currently soft at 104/72.       physical debility PT OT    Pain management PRN regular strength Tylenol and tramadol as needed    Hypokalemia Patient recently replaced and potassium now 4.1    Electronically signed by: Joaquina Donald CNP

## 2021-06-09 NOTE — PROGRESS NOTES
CARDIOTHORACIC SURGERY FOLLOW-UP VISIT -- TELEPHONE     Jerad Arcosl  1960  [id number]                  Reason for visit: Post operative telephone check-in. Patient underwent CABGx3 with Dr. Arora on 6/8/2020.     HPI: Patient is a 60 y.o. male s/p CABG. Patient has past medical history as below. Hospital course was unremarkable with exception of acute on chronic debilitation. Due to this, patient was discharged to TCU to complete his rehab course.      Patient has been doing overall well since discharge. He is still in TCU but feels his rehab is coming along slowly. Review of notes from the TCU demonstrate no issues. Sounds like he will maybe discharge home later this week. Patient did not yet follow-up with primary care since since he remains in TCU. Reports that incision is healing well. Denies fevers, peripheral edema. Appetite is improving and patient is voiding without difficulty. Weight has been stable. Pain management at this point with Tylenol occasionally. Patient has not yet been attending cardiac rehab but does plan to do so when he is discharged home. BP yesterday was 108/69. He does feel that he is lightheaded when getting up and throughout the day.     Past Medical History:   Diagnosis Date     Acute midline low back pain without sciatica      AION (acute ischemic optic neuropathy)      Anemia in chronic renal disease      Avascular necrosis of bones of both hips (H)     s/p bilateral hip replacements     Basal cell carcinoma      Chronic hepatitis B (H)      Clostridium difficile colitis      Coronary artery disease involving native coronary artery of native heart without angina pectoris 06/17/2014    Coronary angiogram 6/17/14: Severe distal 3-vessel disease involving small vessels, not amenable to PCI or CABG     Depression      Diverticulosis      Dyslipidemia      Elevated C-reactive protein (CRP)      FSGS (focal segmental glomerulosclerosis)      Gastric ulcer with hemorrhage 02/12/2012      GERD (gastroesophageal reflux disease)      Glaucoma      Hemorrhoids      HTN (hypertension)      Hyperlipidemia      Hypogonadism in male      Kidney transplanted     focal glomerulosclerosis      NSTEMI (non-ST elevated myocardial infarction) (H) 06/17/2014     JULIANE (obstructive sleep apnea)      Paracentral scotoma     LE     Secondary renal hyperparathyroidism (H)      Septic bursitis      Squamous cell carcinoma        Past Surgical History:   Procedure Laterality Date     APPENDECTOMY       CATARACT EXTRACTION EXTRACAPSULAR W/ INTRAOCULAR LENS IMPLANTATION Bilateral 4-20-10, 6-1-10     CATARACT EXTRACTION W/  INTRAOCULAR LENS IMPLANT Right 04/19/2000     CATARACT EXTRACTION W/  INTRAOCULAR LENS IMPLANT Left 06/01/2000     COLONOSCOPY  02/13/2012     CV CORONARY ANGIOGRAM N/A 6/2/2020    Procedure: Coronary Angiogram;  Surgeon: Alona Florentino MD;  Location: [facility] Cath Lab;  Service: Cardiology     CV LEFT HEART CATHETERIZATION WO LEFT VETRICULOGRAM Left 6/2/2020    Procedure: Left Heart Catheterization Without Left Ventriculogram;  Surgeon: Alona Florentino MD;  Location: [facility] Cath Lab;  Service: Cardiology     ESOPHAGOGASTRODUODENOSCOPY  02/13/2012     HERNIA REPAIR  1995    Lt inguinal     HIP SURGERY  1981    bilat MITZI, revised 2001, 2005     KIDNEY SURGERY  1978, 1993    transplant     kidney transplant      1978, 1993     KNEE SURGERY Bilateral 1983, 1987     MOHS SURGERY       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, auxiliary spleen     SUBTOTAL COLECTOMY  1983     10 cm, diverticulitis      TONSILLECTOMY           Current Outpatient Medications:      acetaminophen (TYLENOL) 325 MG tablet, Take 2 tablets (650 mg total) by mouth every 4 (four) hours as needed for pain or fever., Disp: 20 tablet, Rfl: 0     aspirin 81 MG EC tablet, Take 81 mg by mouth daily., Disp: , Rfl:      calcium citrate-vitamin D (CITRACAL+D) 315-200 mg-unit per tablet, Take 1 tablet by mouth daily., Disp: , Rfl:       clopidogrel (PLAVIX) 75 mg tablet, Take 75 mg by mouth daily., Disp: , Rfl:      docusate sodium (COLACE) 100 MG capsule, Take 1 capsule (100 mg total) by mouth daily., Disp: 30 capsule, Rfl: 0     entecavir (BARACLUDE) 0.5 MG tablet, Take 0.5 mg by mouth daily., Disp: , Rfl:      FLUoxetine (PROZAC) 10 MG tablet, Take 10 mg by mouth daily. Take with 1 tablet with the 40mg to make it 50mg daily., Disp: , Rfl:      FLUoxetine (PROZAC) 40 MG capsule, Take 40 mg by mouth daily., Disp: , Rfl:      furosemide (LASIX) 40 MG tablet, Take 1 tablet (40 mg total) by mouth 2 (two) times a day at 9am and 6pm for 5 days., Disp: 5 tablet, Rfl: 0     latanoprost (XALATAN) 0.005 % ophthalmic solution, Administer 1 drop to both eyes at bedtime., Disp: , Rfl:      metoprolol tartrate (LOPRESSOR) 25 MG tablet, Take 0.5 tablets (12.5 mg total) by mouth 2 (two) times a day., Disp: 30 tablet, Rfl: 0     mycophenolate (CELLCEPT) 250 mg capsule, Take 750 mg by mouth 2 (two) times a day., Disp: , Rfl:      nitroglycerin (NITROSTAT) 0.4 MG SL tablet, Place 1 tablet (0.4 mg total) under the tongue every 5 (five) minutes as needed for chest pain., Disp: 30 tablet, Rfl: 0     OMEGA-3 FATTY ACIDS ORAL, Take 1 capsule by mouth daily., Disp: , Rfl:      omeprazole (PRILOSEC OTC) 20 MG tablet, Take 20 mg by mouth daily before breakfast., Disp: , Rfl:      ONE DAILY MULTIVITAMIN ORAL, Take 1 tablet by mouth daily., Disp: , Rfl:      polyethylene glycol (MIRALAX) 17 gram packet, Take 1 packet (17 g total) by mouth daily as needed (Constipation)., Disp: 20 packet, Rfl: 0     predniSONE (DELTASONE) 5 MG tablet, Take 5 mg by mouth daily., Disp: , Rfl:      rosuvastatin (CRESTOR) 20 MG tablet, Take 1 tablet (20 mg total) by mouth at bedtime., Disp: 30 tablet, Rfl: 3     traMADoL (ULTRAM) 50 mg tablet, Take 1 tablet (50 mg total) by mouth every 6 (six) hours as needed for pain., Disp: 18 tablet, Rfl: 0    Allergies   Allergen Reactions     Penicillins  Hives and Shortness Of Breath     Cephalexin Unknown     Sulfa (Sulfonamide Antibiotics) Unknown     Tetracycline Unknown       ROS:  Gen: No fevers, weight loss/gain  CV: Denies chest pain, peripheral edema  Pulm: Denies SOB  GI/: Voiding without problems, appetite improving.      LABS:  None     IMAGING:  None     PHYSICAL EXAM:   None. Telephone visit.       ASSESSMENT/PLAN: Patient is a 60 y.o. male status-post CABG.     - Hemodynamics are stable although patient describes lightheadedness intermittently. Would recommend trying Toprol 12.5mg daily and seeing if this helps. I will contact [facility] to try to change this today.   - Incision reportedly healing well.   - See primary care within 7-10 days of leaving TCU.  - Follow up with your cardiologist as scheduled (Dr. Lundberg on 7/21).  - Start and continue Cardiac Rehab until completed. I will call and let them know of likely Friday discharge this week.   - Continue strict sternal precautions until 7/20. No lifting >10bs; may gradually increase at this point (6 weeks post-op).   - May start driving on 7/6 (4 weeks post-op) or later, when you feel that you can be a defensive  if not using narcotic pain medications.  - No need for further follow-up with CV surgery unless concerns. Feel free to call our office with questions. [phone].          _______  Krupa Rodriguez PA-C  Cardiothoracic Surgery  [phone]

## 2021-06-09 NOTE — PROGRESS NOTES
Review of system done with patient over the phone. Positive for shortness of breath with activity, chest pain, constipation, joint pain, daytime sleepiness, dizziness, and loss of balance. All other review of systems within normal range.

## 2021-06-09 NOTE — PROGRESS NOTES
ITP ASSESSMENT   Assessment Day: Initial    Session Number: 1/2  Precautions: Surgical, remaining 70-90% blockage in distal areas (may stent in future)    Diagnosis: CAB    Risk Stratification: Medium    Referring Provider: Dr. Dasia Arora  EXERCISE  Exercise Assessment: Initial       6 Minute Walk Test   Pre   Pre Exercise HR: 79                    Pre Exercise BP: 110/72      Peak  Peak HR: 100                   Peak BP: 120/60    Peak feet: 1000    Peak O2 SAT: 98    Peak RPE: 4    Peak MPH: 1.89      Symptoms:  Peak Symptoms: mild SOB      5 mins. Post  5 Min Post HR: 86    5 Min Post BP: 116/80                           Exercise Plan  Goals Next 30 days  ADL'S: Goal #1: Increase U/E strength by use arms 1-2x/week (starting 7/20) on Nustep or arm erg.    Leisure: Goal #2: Walk 10-15 mins, 2-3x/week at home.    Work: Goal #3/LTG: Increase MET level to 5 to support goal of resuming gardening and snow removal.      Education Goals: Patient can state cardiac s/s and appropriate emergency response.    Education Goals Met: Has system for taking medication.;Medication review.      Exercise Prescription  Exercise Mode: Treadmill;Bike;Nustep;Arm Erg.;Stairs    Frequency: 2x/week    Duration: 30-45 mins    Intensity / THR: 20-30 beats above resting heart rate    RPE 11-14  Progression / Met level: 2.6-3    Resistive Training?: Yes      Current Exercise (mins/week): 5      Interventions  Home Exercise:  Mode: Walking    Frequency: 2-3x/week    Duration: 10-15 mins      Education Material : Educational videos;Provide written material;Individual education and counseling;Offer educational classes      Education Completed  Exercise Education Completed: Cardiac Anatomy;Signs and Symptoms;Medication review;RPE;Emergency Plan;Home Exercise;Warm up/cool down;FITT Principles;BP/HR Reponse to exercise;Benefits of Exercise;End point of exercise              Exercise Follow-up/Discharge  Follow up/Discharge: Skilled therapy- Pt needs  "monitored ex to safely progress to 5 METs to resume activities such as gardening and snow removal.  He also needs encouragement to re-establish home ex routine.   NUTRITION  Nutrition Assessment: Initial      Nutrition Risk Factors:  Nutrition Risk Factors: Dyslipidemia;Overweight  Cholesterol: 133 (5/29/20)  LDL: 59  HDL: 49  Triglycerides: 126      Nutrition Plan  Interventions  Other Nutrition Intervention: Therapist/Pt Discussion;Educational Videos;Provide with Written Material      Education Completed  Nutrition Education Completed: Risk factor overview      Goals  Nutrition Goals (Next 30 days): Patient can identify their risk factors for CAD;Patient will follow a low sodium diet;Patient will follow a low saturated fat diet      Goals Met  Nutrition Goals Met: Completed Nutritional Risk Screen;Provided Rate your Plate Survey;Reviewed Dietitian schedule      Height, Weight, and  BMI  Weight: 168 lb 9.6 oz (76.5 kg)  Height: 5' 8\" (1.727 m)  BMI: 25.64      Nutrition Follow-up  Follow-up/Discharge: Pt reports one of his goals is to eat better.  Pt reports eating beef and dairy for protein.  He states he does not eat a lot of fruit.  Drinks 1 cup of coffee per day.  Encouraged pt to meet with dietician.          Other Risk Factors  Other Risk Factor Assessment: Initial      HTN Risk Factor: Hypertension      Pre Exercise BP: 110/72  Post Exercise BP: 116/80      Hypertension Plan  Goals  HTN Goals: Follow low sodium diet;Exercises regularly      Goals Met  HTN Goals Met: Take medication as prescribed      HTN Interventions  HTN Interventions: Therapist/patient discussion;Provide written material;Offer educational videos;Offer educational classes      HTN Education Completed  HTN Education Completed: Medication review;Risk factor overview      Tobacco Risk Factor: NA        Risk Factor Follow-up   Follow-up/Discharge: Pt reports he is compliant with his meds, and uses pillbox.   Pt's BP measurements WNLs during " first session.     PSYCHOSOCIAL  Psychosocial Assessment: Initial       [facility]      PHQ-9 Total Score: 5      Psychosocial Risk Factor: Stress      Psychosocial Plan  Interventions  If PHQ-9 is >9, send letter to MD  Interventions: Offer educational videos and classes;Provide written material;Individual education and counseling       Education Completed  Education Completed: S/S of depression      Goals  Goals (Next 30 days): Identify stressors      Goals Met  Goals Met: Identified Support system;Oriented to stress management classes      Psychosocial Follow-up  Follow-up/Discharge: Pt reports having depression in the past, but has felt very little stress and depression lately.  Pt has good support from his roommates in his group home.  He does live in a sober house and is a recovering alcoholic.  He is currently  from his wife.             Patient involved in Goal setting?: Yes      Signature: _____________________________________________________________    Date: __________________    Time: __________________

## 2021-06-09 NOTE — TELEPHONE ENCOUNTER
[facility] Nurse Triage Telephone Note      Provider: DIANNA De Leon  Facility: [facility]    Facility Type: TCU    Caller: Juan  Call Back Number:  [phone]    Allergies: Penicillins; Cephalexin; Sulfa (sulfonamide antibiotics); and Tetracycline    Reason for call: Nurse calling to report Heme 2 and BMP results that were ordered due to c/o intermittent lightheadedness.  Of note, no c/o lighheadedness today.       Verbal Order/Direction given by Provider: No new orders.      Provider giving order: DIANNA De Leon    Verbal order given to: Juan Perea RN

## 2021-06-09 NOTE — PROGRESS NOTES
Pain management [facility]    Facility:   [facility] [[id number]]   Code Status: FULL CODE      CHIEF COMPLAINT/REASON FOR VISIT:  Chief Complaint   Patient presents with     Problem Visit     F/U post bypass, dizziness        HISTORY:      HPI: Jerad is a 60 y.o. male undergoing physical and occupational therapy at [facility]. He is with past medical history of  end-stage kidney disease status post bilateral kidney transplant, and known coronary artery disease. He was hospitalized 6/2-6/13 following a MI.  Patient underwent coronary angiogram and following the angiographic finding, patient was referred to CV surgery for evaluation for possible coronary revascularization.   Patient was admitted in the hospital for Plavix washout and on June 8, 2020, following inpatient optimization, patient was taken to the operative room where he underwent coronary artery bypass graft x3, with a full individual grafts; left internal mammary artery to the left anterior descending coronary artery, reverse saphenous vein graft to the lateral branch of right coronary artery, reverse saphenous vein graft to the posterior descending branch of right coronary artery, greater saphenous vein procurement from the left lower extremity using endoscopic vein harvest technique, sternal closure ZACH Mayo strips.     Today he is seen for follow-up to bypass surgery and review of dizziness. He continues to report intermittent dizziness but did not have it during my visit. His BP is improving 117/73 with a pulse of 72   His hemoglobin has come up to 12.7 WBC 10.9 and his potassium has increased from 3.2-4.1.  He denied chest pain or shortness of breath.  He reports he is urinating well.   He has no lower extremity edema.. He denied constipation or diarrhea he continues on sternal precautions.He tells me his pain is controlled with just tylenol. He feels he is making progress in therapy. His weights were  reviewed and are stable.     Past Medical History:   Diagnosis Date     Acute midline low back pain without sciatica      AION (acute ischemic optic neuropathy)      Anemia in chronic renal disease      Avascular necrosis of bones of both hips (H)     s/p bilateral hip replacements     Basal cell carcinoma      Chronic hepatitis B (H)      Clostridium difficile colitis      Coronary artery disease involving native coronary artery of native heart without angina pectoris 06/17/2014    Coronary angiogram 6/17/14: Severe distal 3-vessel disease involving small vessels, not amenable to PCI or CABG     Depression      Diverticulosis      Dyslipidemia      Elevated C-reactive protein (CRP)      FSGS (focal segmental glomerulosclerosis)      Gastric ulcer with hemorrhage 02/12/2012     GERD (gastroesophageal reflux disease)      Glaucoma      Hemorrhoids      HTN (hypertension)      Hyperlipidemia      Hypogonadism in male      Kidney transplanted     focal glomerulosclerosis      NSTEMI (non-ST elevated myocardial infarction) (H) 06/17/2014     JULIANE (obstructive sleep apnea)      Paracentral scotoma     LE     Secondary renal hyperparathyroidism (H)      Septic bursitis      Squamous cell carcinoma              Family History   Problem Relation Age of Onset     Other Father         AI with valve repair     Hypertension Father      Kidney disease Father      Other Maternal Grandfather         cerebrovascular disease     Ovarian cancer Paternal Grandmother      Kidney disease Paternal Aunt      Social History     Socioeconomic History     Marital status:      Spouse name: Not on file     Number of children: Not on file     Years of education: Not on file     Highest education level: Not on file   Occupational History     Not on file   Social Needs     Financial resource strain: Not on file     Food insecurity     Worry: Not on file     Inability: Not on file     Transportation needs     Medical: Not on file      Non-medical: Not on file   Tobacco Use     Smoking status: Former Smoker     Packs/day: 1.00     Years: 9.00     Pack years: 9.00     Last attempt to quit: 1988     Years since quittin.5     Smokeless tobacco: Former User   Substance and Sexual Activity     Alcohol use: Yes     Frequency: Monthly or less     Drug use: Never     Sexual activity: Not on file   Lifestyle     Physical activity     Days per week: Not on file     Minutes per session: Not on file     Stress: Not on file   Relationships     Social connections     Talks on phone: Not on file     Gets together: Not on file     Attends Alevism service: Not on file     Active member of club or organization: Not on file     Attends meetings of clubs or organizations: Not on file     Relationship status: Not on file     Intimate partner violence     Fear of current or ex partner: Not on file     Emotionally abused: Not on file     Physically abused: Not on file     Forced sexual activity: Not on file   Other Topics Concern     Not on file   Social History Narrative     Not on file         Review of Systems   Constitutional: Positive for fatigue. Negative for activity change, appetite change, chills and fever.   HENT: Negative for congestion and sore throat.    Eyes: Negative for visual disturbance.   Respiratory: Negative for cough, shortness of breath and wheezing.    Cardiovascular: Negative for chest pain and leg swelling.   Gastrointestinal: Negative for abdominal distention, abdominal pain, constipation, diarrhea and nausea.   Genitourinary: Negative for dysuria.   Musculoskeletal: Negative for arthralgias and myalgias.   Skin: Positive for wound. Negative for color change and rash.   Neurological: Negative for weakness and numbness.   Psychiatric/Behavioral: Negative for agitation, behavioral problems and sleep disturbance.       Vitals:    20 0752   BP: 117/73   Pulse: 72   Resp: 17   Temp: 98.2  F (36.8  C)   SpO2: 98%   Weight: 163 lb  6.4 oz (74.1 kg)       Physical Exam  Constitutional:       Appearance: He is well-developed.   HENT:      Head: Normocephalic.   Eyes:      Conjunctiva/sclera: Conjunctivae normal.   Neck:      Musculoskeletal: Normal range of motion.   Cardiovascular:      Rate and Rhythm: Normal rate and regular rhythm.      Heart sounds: Normal heart sounds. No murmur.   Pulmonary:      Effort: No respiratory distress.      Breath sounds: No wheezing.   Abdominal:      General: Bowel sounds are normal. There is no distension.      Palpations: Abdomen is soft.      Tenderness: There is no abdominal tenderness.   Musculoskeletal: Normal range of motion.   Skin:     General: Skin is warm.      Comments: Midline chest incision with CT sites/C/D/I    dermabond incision with bruising left medial leg.    Neurological:      Mental Status: He is alert and oriented to person, place, and time.   Psychiatric:         Behavior: Behavior normal.           LABS:   Recent Results (from the past 240 hour(s))   HM2(CBC w/o Differential)   Result Value Ref Range    WBC 10.9 4.0 - 11.0 thou/uL    RBC 4.45 4.40 - 6.20 mill/uL    Hemoglobin 12.7 (L) 14.0 - 18.0 g/dL    Hematocrit 40.4 40.0 - 54.0 %    MCV 91 80 - 100 fL    MCH 28.5 27.0 - 34.0 pg    MCHC 31.4 (L) 32.0 - 36.0 g/dL    RDW 15.6 (H) 11.0 - 14.5 %    Platelets 524 (H) 140 - 440 thou/uL    MPV 10.2 8.5 - 12.5 fL   Basic Metabolic Panel   Result Value Ref Range    Sodium 134 (L) 136 - 145 mmol/L    Potassium 4.1 3.5 - 5.0 mmol/L    Chloride 97 (L) 98 - 107 mmol/L    CO2 28 22 - 31 mmol/L    Anion Gap, Calculation 9 5 - 18 mmol/L    Glucose 106 70 - 125 mg/dL    Calcium 9.6 8.5 - 10.5 mg/dL    BUN 24 (H) 8 - 22 mg/dL    Creatinine 0.96 0.70 - 1.30 mg/dL    GFR MDRD Af Amer >60 >60 mL/min/1.73m2    GFR MDRD Non Af Amer >60 >60 mL/min/1.73m2   Basic Metabolic Panel   Result Value Ref Range    Sodium 140 136 - 145 mmol/L    Potassium 3.6 3.5 - 5.0 mmol/L    Chloride 104 98 - 107 mmol/L    CO2  24 22 - 31 mmol/L    Anion Gap, Calculation 12 5 - 18 mmol/L    Glucose 88 70 - 125 mg/dL    Calcium 9.5 8.5 - 10.5 mg/dL    BUN 10 8 - 22 mg/dL    Creatinine 0.71 0.70 - 1.30 mg/dL    GFR MDRD Af Amer >60 >60 mL/min/1.73m2    GFR MDRD Non Af Amer >60 >60 mL/min/1.73m2   HM2(CBC w/o Differential)   Result Value Ref Range    WBC 6.9 4.0 - 11.0 thou/uL    RBC 4.13 (L) 4.40 - 6.20 mill/uL    Hemoglobin 11.9 (L) 14.0 - 18.0 g/dL    Hematocrit 39.4 (L) 40.0 - 54.0 %    MCV 95 80 - 100 fL    MCH 28.8 27.0 - 34.0 pg    MCHC 30.2 (L) 32.0 - 36.0 g/dL    RDW 16.7 (H) 11.0 - 14.5 %    Platelets 635 (H) 140 - 440 thou/uL    MPV 9.9 8.5 - 12.5 fL     Current Outpatient Medications   Medication Sig     acetaminophen (TYLENOL) 325 MG tablet Take 2 tablets (650 mg total) by mouth every 4 (four) hours as needed for pain or fever.     aspirin 81 MG EC tablet Take 81 mg by mouth daily.     calcium citrate-vitamin D (CITRACAL+D) 315-200 mg-unit per tablet Take 1 tablet by mouth daily.     clopidogrel (PLAVIX) 75 mg tablet Take 75 mg by mouth daily.     docusate sodium (COLACE) 100 MG capsule Take 1 capsule (100 mg total) by mouth daily.     entecavir (BARACLUDE) 0.5 MG tablet Take 0.5 mg by mouth daily.     FLUoxetine (PROZAC) 10 MG tablet Take 10 mg by mouth daily. Take with 1 tablet with the 40mg to make it 50mg daily.     FLUoxetine (PROZAC) 40 MG capsule Take 40 mg by mouth daily.     furosemide (LASIX) 40 MG tablet Take 1 tablet (40 mg total) by mouth 2 (two) times a day at 9am and 6pm for 5 days.     latanoprost (XALATAN) 0.005 % ophthalmic solution Administer 1 drop to both eyes at bedtime.     metoprolol tartrate (LOPRESSOR) 25 MG tablet Take 0.5 tablets (12.5 mg total) by mouth 2 (two) times a day.     mycophenolate (CELLCEPT) 250 mg capsule Take 750 mg by mouth 2 (two) times a day.     nitroglycerin (NITROSTAT) 0.4 MG SL tablet Place 1 tablet (0.4 mg total) under the tongue every 5 (five) minutes as needed for chest pain.      OMEGA-3 FATTY ACIDS ORAL Take 1 capsule by mouth daily.     omeprazole (PRILOSEC OTC) 20 MG tablet Take 20 mg by mouth daily before breakfast.     ONE DAILY MULTIVITAMIN ORAL Take 1 tablet by mouth daily.     polyethylene glycol (MIRALAX) 17 gram packet Take 1 packet (17 g total) by mouth daily as needed (Constipation).     predniSONE (DELTASONE) 5 MG tablet Take 5 mg by mouth daily.     rosuvastatin (CRESTOR) 20 MG tablet Take 1 tablet (20 mg total) by mouth at bedtime.     traMADoL (ULTRAM) 50 mg tablet Take 1 tablet (50 mg total) by mouth every 6 (six) hours as needed for pain.     ASSESSMENT:      ICD-10-CM    1. NSTEMI (non-ST elevated myocardial infarction) (H)  I21.4    2. Essential hypertension  I10    3. Pain management  R52    4. Physical debility  R53.81        PLAN:   Fatigue Hemoglobin improved to 12.7    Non-STEMI status post triple bypass PT OT    End-stage renal disease patient with history of bilateral kidney transplant, on CellCept    Chronic viral hepatitis B on entecavir     Hypertension on metoprolol tartrate 12.5 mg twice daily  BP currently soft at 96/68.       physical debility PT OT    Pain management PRN regular strength Tylenol and tramadol as needed    Intermittent Dizziness- check BMP and CBC. Denied during my visit     Electronically signed by: Joaquina Donald CNP

## 2021-06-09 NOTE — PATIENT INSTRUCTIONS - HE
- Hemodynamics are stable although patient describes lightheadedness intermittently. Would recommend trying Toprol 12.5mg daily and seeing if this helps. I will contact [facility] to try to change this today.   - Incision reportedly healing well.   - See primary care within 7-10 days of leaving TCU.  - Follow up with your cardiologist as scheduled (Dr. Lundberg on 7/21).  - Start and continue Cardiac Rehab until completed. I will call and let them know of likely Friday discharge this week.   - Continue strict sternal precautions until 7/20. No lifting >10bs; may gradually increase at this point (6 weeks post-op).   - May start driving on 7/6 (4 weeks post-op) or later, when you feel that you can be a defensive  if not using narcotic pain medications.  - No need for further follow-up with CV surgery unless concerns. Feel free to call our office with questions. [phone].

## 2021-06-09 NOTE — PROGRESS NOTES
[facility]    Facility:  [facility] [[id number]]    Code Status:  FULL CODE    Chief Complaint   Patient presents with     H & P   :                    Patient Active Problem List   Diagnosis     Chest pain     Chronic viral hepatitis B without delta agent and without coma (H)     Coronary artery disease involving native coronary artery of native heart without angina pectoris     S/P kidney transplant     NSTEMI (non-ST elevated myocardial infarction) (H)     HTN (hypertension)     Abnormal cardiovascular stress test     Unstable angina (H)     CAD (coronary artery disease)     End stage renal disease with bilateral renal transplant       History:  Jerad Ross  is a 60 year old male with history of end-stage renal disease, status post bilateral renal transplants (glomerular sclerosis), secondary hyperparathyroidism, chronic immunosuppression and steroid dependence, chronic hepatitis B, GERD, gastric ulcer/GI bleed, glaucoma, coronary artery disease including non-STEMI in 2014 and recent CABG, dyslipidemia, colectomy due to diverticulosis/diverticulitis, bilateral total hip arthroplasties, bilateral hip arthroplasty revision, splenectomy, appendectomy, tonsillectomy, cataracts, history of C. difficile infection seen for admission to TCU on 6/19/2020    Hospital Course: Patient was admitted on June 2 has discharged on June 13.  He presented with unstable angina/acute MI.  Angiogram revealed severe multivessel artery disease with recommendation for bypass procedure performed by Dr. Arora on June 8 with Dr. Lundberg following from cardiology.  Patient recuperated in the ICU was extubated on arrival required phenylephrine and Lasix drips but did quite well with removal of chest tubes by postop day #3.    TCU was recommended for strengthening/rehabilitation    Subjective/ROS:    -augmented by discussion with facility staff involved in direct care      Patient feels like he is doing  relatively well.  He has chest pain with coughing and with certain movements but none at rest.  He satisfied with acetaminophen and tramadol combination.  He reports that his incisions have been healing quite well without irritation or drainage.    He has refused his Lasix as he is felt lightheaded blood pressures have been low but there have been no syncopal episodes.    Patient reports that his bowels are starting to work again.  His appetite is coming back.  He said no bladder issues.  There have been no falls injuries fever sweats chills cough shortness of breath nausea vomiting melena bright red blood per rectum headaches change in vision speaking swallowing or hearing.  Remainder 13 system ROS negative    Past Medical History:   Diagnosis Date     Acute midline low back pain without sciatica      AION (acute ischemic optic neuropathy)      Anemia in chronic renal disease      Avascular necrosis of bones of both hips (H)     s/p bilateral hip replacements     Basal cell carcinoma      Chronic hepatitis B (H)      Clostridium difficile colitis      Coronary artery disease involving native coronary artery of native heart without angina pectoris 06/17/2014    Coronary angiogram 6/17/14: Severe distal 3-vessel disease involving small vessels, not amenable to PCI or CABG     Depression      Diverticulosis      Dyslipidemia      Elevated C-reactive protein (CRP)      FSGS (focal segmental glomerulosclerosis)      Gastric ulcer with hemorrhage 02/12/2012     GERD (gastroesophageal reflux disease)      Glaucoma      Hemorrhoids      HTN (hypertension)      Hyperlipidemia      Hypogonadism in male      Kidney transplanted     focal glomerulosclerosis      NSTEMI (non-ST elevated myocardial infarction) (H) 06/17/2014     JULIANE (obstructive sleep apnea)      Paracentral scotoma     LE     Secondary renal hyperparathyroidism (H)      Septic bursitis      Squamous cell carcinoma      Past Surgical History:   Procedure  Laterality Date     APPENDECTOMY       CATARACT EXTRACTION EXTRACAPSULAR W/ INTRAOCULAR LENS IMPLANTATION Bilateral 4-20-10, 6-1-10     CATARACT EXTRACTION W/  INTRAOCULAR LENS IMPLANT Right 04/19/2000     CATARACT EXTRACTION W/  INTRAOCULAR LENS IMPLANT Left 06/01/2000     COLONOSCOPY  02/13/2012     CV CORONARY ANGIOGRAM N/A 6/2/2020    Procedure: Coronary Angiogram;  Surgeon: Alona Florentino MD;  Location: [facility];  Service: Cardiology     CV LEFT HEART CATHETERIZATION WO LEFT VETRICULOGRAM Left 6/2/2020    Procedure: Left Heart Catheterization Without Left Ventriculogram;  Surgeon: Alona Florentino MD;  Location: [facility];  Service: Cardiology     ESOPHAGOGASTRODUODENOSCOPY  02/13/2012     HERNIA REPAIR  1995    Lt inguinal     HIP SURGERY  1981    bilat MITZI, revised 2001, 2005     KIDNEY SURGERY  1978, 1993    transplant     kidney transplant      1978, 1993     KNEE SURGERY Bilateral 1983, 1987     MOHS SURGERY       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, auxiliary spleen     SUBTOTAL COLECTOMY  1983     10 cm, diverticulitis      TONSILLECTOMY            Family History   Problem Relation Age of Onset     Other Father         AI with valve repair     Hypertension Father      Kidney disease Father      Other Maternal Grandfather         cerebrovascular disease     Ovarian cancer Paternal Grandmother      Kidney disease Paternal Aunt    :       Social History     Socioeconomic History     Marital status:      Spouse name: Not on file     Number of children: Not on file     Years of education: Not on file     Highest education level: Not on file   Occupational History     Not on file   Social Needs     Financial resource strain: Not on file     Food insecurity     Worry: Not on file     Inability: Not on file     Transportation needs     Medical: Not on file     Non-medical: Not on file   Tobacco Use     Smoking status: Former Smoker     Packs/day: 1.00     Years: 9.00     Pack years:  9.00     Last attempt to quit: 1988     Years since quittin.4     Smokeless tobacco: Former User   Substance and Sexual Activity     Alcohol use: Yes     Frequency: Monthly or less     Drug use: Never     Sexual activity: Not on file   Lifestyle     Physical activity     Days per week: Not on file     Minutes per session: Not on file     Stress: Not on file   Relationships     Social connections     Talks on phone: Not on file     Gets together: Not on file     Attends Yarsani service: Not on file     Active member of club or organization: Not on file     Attends meetings of clubs or organizations: Not on file     Relationship status: Not on file     Intimate partner violence     Fear of current or ex partner: Not on file     Emotionally abused: Not on file     Physically abused: Not on file     Forced sexual activity: Not on file   Other Topics Concern     Not on file   Social History Narrative     Not on file   :    Social history: Patient says he is living in a group home situation in the [facility] and has onsite support there upon discharge.    Current Outpatient Medications on File Prior to Visit   Medication Sig Dispense Refill     acetaminophen (TYLENOL) 325 MG tablet Take 2 tablets (650 mg total) by mouth every 4 (four) hours as needed for pain or fever. 20 tablet 0     aspirin 81 MG EC tablet Take 81 mg by mouth daily.       calcium citrate-vitamin D (CITRACAL+D) 315-200 mg-unit per tablet Take 1 tablet by mouth daily.       clopidogrel (PLAVIX) 75 mg tablet Take 75 mg by mouth daily.       docusate sodium (COLACE) 100 MG capsule Take 1 capsule (100 mg total) by mouth daily. 30 capsule 0     entecavir (BARACLUDE) 0.5 MG tablet Take 0.5 mg by mouth daily.       FLUoxetine (PROZAC) 10 MG tablet Take 10 mg by mouth daily. Take with 1 tablet with the 40mg to make it 50mg daily.       FLUoxetine (PROZAC) 40 MG capsule Take 40 mg by mouth daily.       latanoprost (XALATAN) 0.005 % ophthalmic  solution Administer 1 drop to both eyes at bedtime.       metoprolol tartrate (LOPRESSOR) 25 MG tablet Take 0.5 tablets (12.5 mg total) by mouth 2 (two) times a day. 30 tablet 0     mycophenolate (CELLCEPT) 250 mg capsule Take 750 mg by mouth 2 (two) times a day.       nitroglycerin (NITROSTAT) 0.4 MG SL tablet Place 1 tablet (0.4 mg total) under the tongue every 5 (five) minutes as needed for chest pain. 30 tablet 0     OMEGA-3 FATTY ACIDS ORAL Take 1 capsule by mouth daily.       omeprazole (PRILOSEC OTC) 20 MG tablet Take 20 mg by mouth daily before breakfast.       ONE DAILY MULTIVITAMIN ORAL Take 1 tablet by mouth daily.       polyethylene glycol (MIRALAX) 17 gram packet Take 1 packet (17 g total) by mouth daily as needed (Constipation). 20 packet 0     predniSONE (DELTASONE) 5 MG tablet Take 5 mg by mouth daily.       rosuvastatin (CRESTOR) 20 MG tablet Take 1 tablet (20 mg total) by mouth at bedtime. 30 tablet 3     traMADoL (ULTRAM) 50 mg tablet Take 1 tablet (50 mg total) by mouth every 6 (six) hours as needed for pain. 18 tablet 0     furosemide (LASIX) 40 MG tablet Take 1 tablet (40 mg total) by mouth 2 (two) times a day at 9am and 6pm for 5 days. 5 tablet 0     magnesium oxide (MAG-OX) 400 mg (241.3 mg magnesium) tablet Take 1 tablet (400 mg total) by mouth daily for 5 days. 5 tablet 0     No current facility-administered medications on file prior to visit.    :      ALLERGIES:  Penicillins; Cephalexin; Sulfa (sulfonamide antibiotics); and Tetracycline    Vitals:    Vital signs: Reviewed per facility EMR vitals including as follows:              Blood pressure 98/78 his systolics have been running between 98 and 135 generally better in the last day or so.  Respirations 17 heart rate 72 temperature 98.2 O2 sats 97% on room air weight 163 pounds.  Notably he presented at 168 pounds on June 14.  There is no height or weight on file to calculate BMI.    Physical exam:    General:  Alert  oriented x3 appears  in no acute distress    HEENT:  NC/AT, sclera clear, EOMI gaze is conjugate,   Neck:  no mass, adenopathy, thyromegaly no neck vein distention  Chest: Sternal scar appears to have skin intact healing well without breakdown scabbing swelling erythema.  His chest tube sites in the upper abdomen are scabbed without drainage fluctuance  Heart:  rhythm: Regular rate: Near 80 m/g/r: None appreciated Pmi:  palpable in precordium   Lungs: Decreased breath sounds bases but moving air well without rales rhonchi or wheezing  Abd:  nontender, nondistended, bowel sounds audible and wnls, no mass, no organomegaly     Ext:  perfusion/pulses/capillary refill           Edema none  Skin:  wounds clear in visualized areas except as noted above and below            Warm, dry, pink, intact     Interesting linear bruising pattern of the ankle and lower leg mostly anteriorly.  Vein donor sites clean and dry  Neuro:  as above               Moves all 4 extremities.  facies symmetric  Due to the 2020 Covid 19 pandemic, except as noted above, the patient was visually observed at a 6 foot plus distance.  An observational exam was performed in an effort to keep patient safe from Covid 19 and other communicable diseases.   Labs:  Lab Results   Component Value Date    WBC 10.9 06/16/2020    HGB 12.7 (L) 06/16/2020    HCT 40.4 06/16/2020    MCV 91 06/16/2020     (H) 06/16/2020     Results for orders placed or performed in visit on 06/16/20   Basic Metabolic Panel   Result Value Ref Range    Sodium 134 (L) 136 - 145 mmol/L    Potassium 4.1 3.5 - 5.0 mmol/L    Chloride 97 (L) 98 - 107 mmol/L    CO2 28 22 - 31 mmol/L    Anion Gap, Calculation 9 5 - 18 mmol/L    Glucose 106 70 - 125 mg/dL    Calcium 9.6 8.5 - 10.5 mg/dL    BUN 24 (H) 8 - 22 mg/dL    Creatinine 0.96 0.70 - 1.30 mg/dL    GFR MDRD Af Amer >60 >60 mL/min/1.73m2    GFR MDRD Non Af Amer >60 >60 mL/min/1.73m2         No results found for: TSH  Lab Results   Component Value Date     HGBA1C 5.6 06/03/2020     [unfilled]  No results found for: XASNIWRR04  No results found for: BNP  [unfilled]  Most Recent EKG     Units 06/08/20  1336   VENTRATE BPM 97   ATRIALRATE BPM 97   QRSDURATION ms 134   QTINTERVAL ms 440   QTCCALC ms 558   P [place] degrees 28   RAXIS degrees -25   TAXIS degrees -7   MUSEDX  Normal sinus rhythm  Right bundle branch block  Inferior infarct , age undetermined  Anterolateral infarct , age undetermined  Abnormal ECG  When compared with ECG of 28-MAY-2020 12:19,  Significant changes have occurred  Confirmed by BRET KEBEDE MD LOC: ([id number]) on 6/8/2020 3:42:01 PM         Assessment/Plan:      ICD-10-CM    1. Coronary artery disease involving other coronary artery bypass graft, angina presence unspecified  I25.810    2. Chronic viral hepatitis B without delta agent and without coma (H)  B18.1    3. End stage renal disease (H)  N18.6    4. Essential hypertension  I10    5. NSTEMI (non-ST elevated myocardial infarction) (H)  I21.4        Coronary artery disease  CABG x3 June 8, 2020   VIEYRA to LAD   R SVG to PL   R SVG to PDA  EVH from LLE  History of non-STEMI 2014, 2020   Patient appears to be doing quite well at this point.  Wounds are healing well.  His function is returning without any apparent complications.  - Dual antiplatelets aspirin plus Plavix in place at this time  - Crestor 20 mg daily  - Received 5 days of Lasix which has come to and and, though he did refuse some of that.  Considering his low blood pressure and 5 pound weight loss he may have been right to do so.  - Continue acetaminophen, continue tramadol for now anticipated taper off before discharge from TCU  - Metoprolol 12.5 mg twice daily  -Minimal postop anemia 12.7 not requiring treatment, spontaneous resolution anticipated    Bilateral renal transplant due to glomerulosclerosis   -Chronic immunosuppression  Chronic steroid dependence  Secondary hyperparathyroidism  Secondary osteoporosis,  presumed  Splenectomy   Patient's kidney function held up well.  GFR estimated greater than 60 on 6/16/2020 with a creatinine 0.96  -Avoid nephrotoxins  -Patient is not on directed osteoporosis therapy.  He thinks he might of been on Fosamax at one point.  Unclear if he has been lost to follow-up?  I strongly encouraged him to follow-up with his transplant clinic with a question for what if anything he should be doing for bone health.  Continue calcium and vitamin D in the meanwhile  -Patient reports that splenectomy was done at the time of his transplant and felt to be an important part of the success at that time in history.  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    GERD  History of gastric ulcer  History of upper GI bleed   Continue omeprazole.  Patient is asymptomatic at this time    Dyslipidemia   Crestor as above    Glaucoma   No change in his latanoprost    Subtotal colectomy   Patient's GI function is returning toward his normal.    JULIANE   Untreated.  Patient says he had CPAP in the past but does not use it.    Low blood pressures   At times with some symptoms of orthostasis.  Lasix is discontinued at this time.  We discussed safety measures to prevent orthostatic drops.  Monitor intake and fluids.  Continue beta-blocker as tolerated.    Chronic hepatitis B   Entecavir anticipated lifelong      Discussed with facility staff      Geronimo Strickland MD

## 2021-06-10 NOTE — PROGRESS NOTES
ITP ASSESSMENT   Assessment Day: 30 Day    Session Number: 7  Precautions: Surgical, remaining 70-90% blockage in distal areas (may stent in future)    Diagnosis: CAB    Risk Stratification: Medium    Referring Provider: Aston Perry MD   ITP sent to Dr. Lundberg  EXERCISE  Exercise Assessment: Reassessment                        Exercise Plan  Goals Next 30 days  ADL'S: Goal #1: Increase MET level to 3.5-4 to support goal of climbing stairs without SOB.    Leisure: Goal #2: Try wts or arm erg 1x/week to improve U/E strength.    Work: Goal #3/LTG: Increase MET level to 5 to support goal of resuming gardening and snow removal.       Education Goals: Patient can state cardiac s/s and appropriate emergency response.    Education Goals Met: Has system for taking medication.;Medication review.                          Goals Met  Initial ADL's goals met: Goal not met, as pt has c/o shoulder pain, especially L shoulder pain, while doing Nustep.    Initial Leisure goals met: Goal met- pt has been walking 2-3x/week at home for 10-15 mins.    Intial Work goals met: Goal not met- pt has not yet reached 5 METs.    Initial Progression: Pt has progressed to 2.4-2.5 METs for 20-25 mins on Nustep so far in rehab.  He is limited with his arm use by L shoulder pain.  He also has been somewhat limited by his L knee pain.  He reports walking at home regularly.      Exercise Prescription  Exercise Mode: Treadmill;Bike;Nustep;Arm Erg.;Stairs    Frequency: 2x/week    Duration: 30-45 mins    Intensity / THR: 20-30 beats above resting heart rate    RPE 11-14  Progression / Met level: 2.7-3.2    Resistive Training?: Yes      Current Exercise (mins/week): 110      Interventions  Home Exercise:  Mode: Walking    Frequency: 2-3x/week    Duration: 10-15 mins      Education Material : Educational videos;Provide written material;Individual education and counseling;Offer educational classes      Education Completed  Exercise Education Completed:  "Cardiac Anatomy;Signs and Symptoms;Medication review;RPE;Emergency Plan;Home Exercise;Warm up/cool down;FITT Principles;BP/HR Reponse to exercise;Benefits of Exercise;End point of exercise              Exercise Follow-up/Discharge  Follow up/Discharge: Skilled therapy- Pt needs monitored ex to safely progress to 5 METs to resume activities such as gardening and snow removal.  He also needs encouragement to re-establish home ex routine.   NUTRITION  Nutrition Assessment: Reassessment      Nutrition Risk Factors:  Nutrition Risk Factors: Dyslipidemia  Cholesterol: 133  LDL: 59  HDL: 49  Triglycerides: 126      Nutrition Plan  Interventions  Other Nutrition Intervention: Therapist/Pt Discussion;Educational Videos;Provide with Written Material    Education Completed  Nutrition Education Completed: Low Saturated fat diet;Risk factor overview;Low sodium diet      Goals  Nutrition Goals (Next 30 days): Patient will follow a low sodium diet;Patient will follow a low saturated fat diet      Goals Met  Nutrition Goals Met: Completed Nutritional Risk Screen;Provided Rate your Plate Survey;Reviewed Dietitian schedule      Height, Weight, and  BMI  Weight: 164 lb 1.6 oz (74.4 kg)  Height: 5' 8\" (1.727 m)  BMI: 24.96      Nutrition Follow-up  Follow-up/Discharge: Patient reports that he is trying to eat healthy, limits red meat. FOr protein pt. eats nuts, tuna fish, and chicken.  Patient does occas. make smothies.  Encouraged pt. to bring back his RYP diet survey.  Patient interested in meeting with the dietician.       Other Risk Factors  Other Risk Factor Assessment: Reassessment      HTN Risk Factor: Hypertension      Pre Exercise BP: 112/70  Post Exercise BP: 110/82      Hypertension Plan  Goals  HTN Goals: Follow low sodium diet;Exercises regularly      Goals Met  HTN Goals Met: Take medication as prescribed      HTN Interventions  HTN Interventions: Therapist/patient discussion;Provide written material;Offer educational " videos;Offer educational classes      HTN Education Completed  HTN Education Completed: Medication review;Risk factor overview      Tobacco Risk Factor: NA        Risk Factor Follow-up   Follow-up/Discharge: Continues to be compliant with medications.         PSYCHOSOCIAL  Psychosocial Assessment: Reassessment       [place] [employer] Q of L Summary Score: 25      PHQ-9 Total Score: 5      Psychosocial Risk Factor: Stress      Psychosocial Plan  Interventions  Interventions: Offer educational videos and classes;Provide written material;Individual education and counseling      Education Completed  Education Completed: S/S of depression      Goals  Goals (Next 30 days): Improvement in [place] [employer] score;Practicing stress management skills      Goals Met  Goals Met: Identified Support system;Oriented to stress management classes;Identify stressors      Psychosocial Follow-up  Follow-up/Discharge: Pt reports having depression in the past, but has felt very little stress and depression lately.  Pt has good support from his roommates in his group home.  He does live in a sober house and is a recovering alcoholic.  He is currently  from his wife.  Patient reports a good support system with his friends.  Patient does meditation for 15-20 min. daily for relaxation.       Patient involved in Goal setting?: Yes      Signature: _____________________________________________________________    Date: __________________    Time: __________________

## 2021-06-11 NOTE — PROGRESS NOTES
ITP ASSESSMENT   Assessment Day: 60 Day    Session Number: 16  Precautions: Surgical, remaining 70-90% blockage in distal areas (may stent in the future)    Diagnosis: CAB    Risk Stratification: Medium    Referring Provider: Aston Perry MD   ITP: Dr. Lundberg  EXERCISE  Exercise Assessment: Reassessment                             Exercise Plan  Goals Next 30 days   Goal #1: Pt to increase MET level to 3.5-4.5 METS to tolerate climbing 2 flights of 7 stairs at home. Utilize stairs in cardiac rehab at a 3.5+ MET level.    : Goal #2: Pt to tolerate light weights starting at 1lb 1 set of 10 reps 1-2x/week in cardiac rehab to increase upper body strength to resume carrying groceries at 1-15lbs.      Goal #3/LTG: Pt to increase MET level to a 5 to support goal of resuming gardening for 20-30 minutes and using a snowblower for 30 minutes this winter.       Education Goals: All goals in this section met    Education Goals Met: Medication review.;Has system for taking medication.;Patient can state cardiac s/s and appropriate emergency response.                          Goals Met  30 day ADL'S goals met: Goal #1 Not MET: Pt has reached a 3.2 MET and is working towards 3.5-4.5 METs and will initiate stairs in rehab in the next 30 days to help support this goal.     30 day Leisure goals met: Goal #2 Not MET: Pt has declined the arm ergometer  due to shoulder issues but will try light weights so continue same goal.     30 day Work goals met: LTG NOT MET: Pt has reached a 3.2 MET Level and has not resumed this activity will continue same goal.     30 Day Progression: Pt has progressed to a 3.2 MET on the TM for 25 minutes and a 3.2 MET level on the Nustep for 20 minutes. Pt declines arm ergometer due to shoulder issues but will try light weights. Pt does have B knee pain on the TM at times and was deconditioned due to post surgery but is progressing. PT is compliant with attending rehab.       Initial ADL's goals met: Goal  not met, as pt has c/o shoulder pain, especially L shoulder pain, while doing Nustep.    Initial Leisure goals met: Goal met- pt has been walking 2-3x/week at home for 10-15 mins.    Intial Work goals met: Goal not met- pt has not yet reached 5 METs.    Initial Progression: Pt has progressed to 2.4-2.5 METs for 20-25 mins on Nustep so far in rehab.  He is limited with his arm use by L shoulder pain.  He also has been somewhat limited by his L knee pain.  He reports walking at home regularly.      Exercise Prescription  Exercise Mode: Treadmill;Bike;Nustep;Stairs;Hallway Walking    Frequency: 2x/week    Duration: 35-45 minutes    Intensity / THR:([place] 126-140bpm)    RPE 11-14  Progression / Met level: 3.1-3.5+    Resistive Training?: Yes      Current Exercise (mins/week): 155      Interventions  Home Exercise:  Mode: walking    Frequency: 4x/week    Duration: 20-30 min      Education Material : Educational videos;Provide written material;Individual education and counseling;Offer educational classes      Education Completed  Exercise Education Completed: Cardiac Anatomy;Signs and Symptoms;Medication review;RPE;Emergency Plan;Home Exercise;Warm up/cool down;FITT Principles;BP/HR Reponse to exercise;Benefits of Exercise;End point of exercise              Exercise Follow-up/Discharge  Follow up/Discharge: SKilled therapy needed to monitor pt's EKG s/p CABG and for any anginal symptoms in regards to his remaining disease. Skilled therapy needed to monitor pt's bp with exercise and provide encouragement as pt continues to progress MET level to a 5 MET level to get back to outdoor activities. Pt is more diligent with his HEP of walking at least 2-3x/week outside of rehab. Pt has noticed an increase in endurance and stamina and is pleased with progress in cardiac rehab. Pt continue to guide and safely progress pt.    NUTRITION  Nutrition Assessment: Reassessment      Nutrition Risk Factors:  Nutrition Risk Factors:  "Dyslipidemia  Cholesterol: 133  LDL: 59  HDL: 49  Triglycerides: 126      Nutrition Plan  Interventions  Diet Consult: Completed    Other Nutrition Intervention: Therapist/Pt Discussion;Provide with Written Material    Follow-Up Rate Your Plate Score: 51      Education Completed  Nutrition Education Completed: Low Saturated fat diet;Risk factor overview;Low sodium diet;Weight management      Goals  Nutrition Goals (Next 30 days): Patient will follow a low saturated fat diet      Goals Met  Nutrition Goals Met: Completed Nutritional Risk Screen;Provided Rate your Plate Survey;Reviewed Dietitian schedule;Patient can identify their risk factors for CAD;Patient follows a low sodium diet;Patient knows appropriate portion size      Height, Weight, and  BMI  Weight: 167 lb 1.6 oz (75.8 kg)  Height: 5' 8\" (1.727 m)  BMI: 25.41      Nutrition Follow-up  Follow-up/Discharge: Pt has met with dietician and does report this past week he has fallen off track but otherwise he is following a heart healthy diet 75% of the time. Pt is reading labels and adds very little salt and does feel he is staying under 2000mg daily. Pt does his own cooking. Pt is eating chicken , nuts and not much red meat but he has been snacking more on higher fat foods. We talked about moderation. Pt is trying to drink more water. Pt is compliant with statin medications. Pt is making smoothies to incorporate ffresh fruits and vegetables. Pt has made changes and remains motivated to continue to make them.          Other Risk Factors  Other Risk Factor Assessment: Reassessment      HTN Risk Factor: Hypertension      Pre Exercise BP: 110/78  Post Exercise BP: 112/74      Hypertension Plan  Goals  HTN Goals: Patient demonstrates understanding of HTN, no goals identified for the next 30 days      Goals Met  HTN Goals Met: Take medication as prescribed;Follow low sodium diet;Exercises regularly      HTN Interventions  HTN Interventions: Therapist/patient " discussion;Provide written material      HTN Education Completed  HTN Education Completed: Low sodium diet;Medication review;Risk factor overview      Tobacco Risk Factor: NA      Risk Factor Follow-up   Follow-up/Discharge: Pt is compliant with metoprolol. Pt resting and exercise bp are WNL. PT is following a low sodium diet.      PSYCHOSOCIAL  Psychosocial Assessment: Reassessment       [facility] COOP Q of L Summary Score: 25      PHQ-9 Total Score: 5      Psychosocial Risk Factor: Stress      Psychosocial Plan  Interventions  Interventions: Offer educational videos and classes;Provide written material;Individual education and counseling      Education Completed  Education Completed: Relaxation/Coping Techniques;S/S of depression;Effects of stress on body      Goals  Goals (Next 30 days): Improvement in [place] COOP score      Goals Met  Goals Met: Identified Support system;Oriented to stress management classes;Identify stressors;Practicing stress management skills      Psychosocial Follow-up  Follow-up/Discharge: Pt does report having depression in the past but has felt very little stress and depression lately and feels he is coping very well with his procedure. Pt has a good support system in his roommates in his sober living home. Pt also relies on his friends. Pt enjoys coming to cardiac rehab. Pt meditates, prays and reads for relaxation and also enjoys walking for stress management. Pt again reports he is managing his stress well.              Patient involved in Goal setting?: Yes

## 2021-06-12 NOTE — PROGRESS NOTES
ITP ASSESSMENT   Assessment Day: 90 Day    Session Number: 22  Precautions: Surgical, remaining 70-90% blockage in distal areas (may stent in the future)    Diagnosis: CAB    Risk Stratification: Medium    Referring Provider: Aston Perry MD  EXERCISE  Exercise Assessment: Reassessment                           Exercise Plan  Goals Next 30 days  ADL'S: Goal #!:Pt to do wts 1x/week in cardiac rehab to build leg strength and stamina     Leisure: Goal#2:Pt to do wts 1x/week in cardiac rehab to build u/e strength and stamina    Work: Goal #3/LTG: Pt to increase MET level to a 5 to support goal of resuming gardening for 20-30 minutes and using a snowblower for 30 minutes this winter.       Initial ADL's goals met: Goal not met, as pt has c/o shoulder pain, especially L shoulder pain, while doing Nustep.    Initial Leisure goals met: Goal met- pt has been walking 2-3x/week at home for 10-15 mins.    Intial Work goals met: Goal not met- pt has not yet reached 5 METs.    Initial Progression: Pt has progressed to 2.4-2.5 METs for 20-25 mins on Nustep so far in rehab.  He is limited with his arm use by L shoulder pain.  He also has been somewhat limited by his L knee pain.  He reports walking at home regularly.      Education Goals: All goals in this section met    Education Goals Met: Medication review.;Has system for taking medication.;Patient can state cardiac s/s and appropriate emergency response.      Exercise Prescription  Exercise Mode: Treadmill;Bike;Nustep;Stairs;Hallway Walking    Frequency: 2x/week    Duration: 35-45 min    Intensity / THR:  [place] 126-140bpm    RPE 11-14  Progression / Met level: 3.7-5    Resistive Training?: Yes      Current Exercise (mins/week): 185      Interventions  Home Exercise:  Mode: walking    Frequency: 4x/week    Duration: 30 min      Education Material : Educational videos;Provide written material;Individual education and counseling;Offer educational classes      Education  "Completed  Exercise Education Completed: Cardiac Anatomy;Signs and Symptoms;Medication review;RPE;Emergency Plan;Home Exercise;Warm up/cool down;FITT Principles;BP/HR Reponse to exercise;Benefits of Exercise;End point of exercise              Exercise Follow-up/Discharge  Follow up/Discharge: Skilled therapy continues to be needed to monitor pt's EKG s/p CABG and for any anginal symptoms in regards to his remaining disease. Skilled therapy needed to monitor pt's bp with exercise and provide encouragement as pt continues to progress MET level to a 5 MET level to get back to outdoor activities. Pt is more diligent with his HEP of walking at least 2-3x/week outside of rehab. Pt has noticed an increase in endurance and stamina and is pleased with progress in cardiac rehab. Pt continue to guide and safely progress pt.    NUTRITION  Nutrition Assessment: Reassessment      Nutrition Risk Factors:  Nutrition Risk Factors: Dyslipidemia      Nutrition Plan  Interventions  Diet Consult: Completed    Other Nutrition Intervention: Therapist/Pt Discussion;Provide with Written Material      Education Completed  Nutrition Education Completed: Low Saturated fat diet;Risk factor overview;Low sodium diet;Weight management      Goals  Nutrition Goals (Next 30 days): Patient will follow a low saturated fat diet      Goals Met  Nutrition Goals Met: Completed Nutritional Risk Screen;Provided Rate your Plate Survey;Reviewed Dietitian schedule;Patient can identify their risk factors for CAD;Patient follows a low sodium diet;Patient knows appropriate portion size      Height, Weight, and  BMI  Weight: 168 lb 12.8 oz (76.6 kg)  Height: 5' 8\" (1.727 m)  BMI: 25.67      Nutrition Follow-up  Follow-up/Discharge: Pt reports he typically cooks at home.  He does pay attention to labels somewhat, but has not been following heart healthy diet very closely the last few weeks.  He does report regualarly eating fruits and vegetables.  Encouraged pt to " read labels closely, especially for salt and fat content.         Other Risk Factors  Other Risk Factor Assessment: Reassessment      HTN Risk Factor: Hypertension      Pre Exercise BP: 124/82  Post Exercise BP: 102/78      Hypertension Plan  Goals  HTN Goals: Patient demonstrates understanding of HTN, no goals identified for the next 30 days      Goals Met  HTN Goals Met: Take medication as prescribed;Follow low sodium diet;Exercises regularly      HTN Interventions  HTN Interventions: Therapist/patient discussion;Provide written material      HTN Education Completed  HTN Education Completed: Low sodium diet;Medication review;Risk factor overview      Tobacco Risk Factor: NA        Risk Factor Follow-up   Follow-up/Discharge: Pt reports he continues to be compliant with meds.  He uses pillbox to help organize meds.     PSYCHOSOCIAL  Psychosocial Assessment: Reassessment         Psychosocial Risk Factor: Stress      Psychosocial Plan  Interventions  If PHQ-9 is >9, send letter to MD  Interventions: Offer educational videos and classes;Provide written material;Individual education and counseling       Education Completed  Education Completed: Relaxation/Coping Techniques;S/S of depression;Effects of stress on body      Goals  Goals (Next 30 days): Improvement in [employer] COOP score      Goals Met  Goals Met: Identified Support system;Oriented to stress management classes;Identify stressors;Practicing stress management skills      Psychosocial Follow-up  Follow-up/Discharge: Pt reports his stress level is moderate to high.  He notes his marriage is a stressor.  He does report he has been meditating, 20 mins 2x daily, which has helped his stress and anxiety level.             Patient involved in Goal setting?: Yes      Signature: _____________________________________________________________    Date: __________________    Time: __________________

## 2021-06-12 NOTE — PROGRESS NOTES
ITP ASSESSMENT   Assessment Day: 120 Day    Session Number: 25  Precautions: Surgical, remaining 70-90% blockage in distal areas (may stent in the future)    Diagnosis: CAB    Risk Stratification: Medium    Referring Provider: Aston Perry MD  EXERCISE  Exercise Assessment: Discharge       Exercise Plan  Education Goals: All goals in this section met    Education Goals Met: Medication review.;Has system for taking medication.;Patient can state cardiac s/s and appropriate emergency response.                          Goals Met  90 day ADL'S goals met: Goal met- pt has built U/E strength by doing wts 1-2x/week in rehab.    90 day Leisure goals met: Goal met- pt has done wts in rehab 1-2x/week to build strength.    90 day Work goals met: Goal not met- pt did not progress to 5 MEts.    90 Day Progress: Pt has progressed to 3.7 METs in rehab for 20 mins.  He reports walking at home, 2-3x/week for 30 mins  He is confident he will continue walking at home..  He has been limited by R hip and R knee pain.      60 day ADL'S goals met: Goal 1 partially met, pt tolerated stair climbing in cardiac rehab at 4.3 MEt level and has reached 3.6 MET level on treadmill.    60 day Leisure goals met: Goal 2 not met, pt has only done wts 1x in cardiac rehab. Pt would like to start doing wts 1x/week in cardiac rehab.    60 day Work goals met: LTG not met, pt continues gradual progression.    60 Day Progression: Pt has reached 3.6 MET level on treadmill, 4.3 MET level on stairs (1x)      30 day ADL'S goals met: Goal #1 Not MET: Pt has reached a 3.2 MET and is working towards 3.5-4.5 METs and will initiate stairs in rehab in the next 30 days to help support this goal.     30 day Leisure goals met: Goal #2 Not MET: Pt has declined the arm ergometer  due to shoulder issues but will try light weights so continue same goal.     30 day Work goals met: LTG NOT MET: Pt has reached a 3.2 MET Level and has not resumed this activity will continue  same goal.     30 Day Progression: Pt has progressed to a 3.2 MET on the TM for 25 minutes and a 3.2 MET level on the Nustep for 20 minutes. Pt declines arm ergometer due to shoulder issues but will try light weights. Pt does have B knee pain on the TM at times and was deconditioned due to post surgery but is progressing. PT is compliant with attending rehab.       Initial ADL's goals met: Goal not met, as pt has c/o shoulder pain, especially L shoulder pain, while doing Nustep.    Initial Leisure goals met: Goal met- pt has been walking 2-3x/week at home for 10-15 mins.    Intial Work goals met: Goal not met- pt has not yet reached 5 METs.    Initial Progression: Pt has progressed to 2.4-2.5 METs for 20-25 mins on Nustep so far in rehab.  He is limited with his arm use by L shoulder pain.  He also has been somewhat limited by his L knee pain.  He reports walking at home regularly.      Current Exercise (mins/week): 185      Interventions  Home Exercise:  Mode: Walking    Frequency: 3-5x/week    Duration: 30 mins      Education Material : Educational videos;Provide written material;Individual education and counseling;Offer educational classes      Education Completed  Exercise Education Completed: Cardiac Anatomy;Signs and Symptoms;Medication review;RPE;Emergency Plan;Home Exercise;Warm up/cool down;FITT Principles;BP/HR Reponse to exercise;Benefits of Exercise;End point of exercise              Exercise Follow-up/Discharge  Follow up/Discharge: Pt has progressed to 3.7 METs for 20 mins on TM.  He has been limited by R knee and R hip pain.  Pt is being d/c per his request.  Unable to complete post-program evaluation, including 6 min walk and psychosocial surveys.   NUTRITION  Nutrition Assessment: Discharge      Nutrition Risk Factors:  Nutrition Risk Factors: Dyslipidemia      Nutrition Plan  Interventions  Diet Consult: Completed    Other Nutrition Intervention: Therapist/Pt Discussion;Provide with Written  "Material    Follow-Up Rate Your Plate Score: 51      Education Completed  Nutrition Education Completed: Low Saturated fat diet;Risk factor overview;Low sodium diet;Weight management      Goals  Nutrition Goals (Next 30 days): Patient will follow a low saturated fat diet      Goals Met  Nutrition Goals Met: Completed Nutritional Risk Screen;Provided Rate your Plate Survey;Reviewed Dietitian schedule;Patient can identify their risk factors for CAD;Patient follows a low sodium diet;Patient knows appropriate portion size      Height, Weight, and  BMI  Weight: 167 lb 9.6 oz (76 kg)  Height: 5' 8\" (1.727 m)  BMI: 25.49      Nutrition Follow-up  Follow-up/Discharge: Pt reports he typically cooks at home.  He does pay attention to labels somewhat, but has not been following heart healthy diet very closely the last few weeks.  He does report regualarly eating fruits and vegetables.  Encouraged pt to read labels closely, especially for salt and fat content.         Other Risk Factors  Other Risk Factor Assessment: Discharge      HTN Risk Factor: Hypertension      Pre Exercise BP: 100/68  Post Exercise BP: 118/80      Hypertension Plan  Goals  HTN Goals: Patient demonstrates understanding of HTN, no goals identified for the next 30 days      Goals Met  HTN Goals Met: Take medication as prescribed;Follow low sodium diet;Exercises regularly      HTN Interventions  HTN Interventions: Therapist/patient discussion;Provide written material      HTN Education Completed  HTN Education Completed: Low sodium diet;Medication review;Risk factor overview      Tobacco Risk Factor: NA        Risk Factor Follow-up   Follow-up/Discharge: Pt reports he continues to be compliant with meds.  He uses pillbox to help organize meds.     PSYCHOSOCIAL  Psychosocial Assessment: Discharge         Psychosocial Risk Factor: Stress      Psychosocial Plan  Interventions  Interventions: Offer educational videos and classes;Provide written " material;Individual education and counseling      Education Completed  Education Completed: Relaxation/Coping Techniques;S/S of depression;Effects of stress on body      Goals  Goals (Next 30 days): Improvement in [employer] COOP score      Goals Met  Goals Met: Identified Support system;Oriented to stress management classes;Identify stressors;Practicing stress management skills      Psychosocial Follow-up  Follow-up/Discharge: Pt reports his stress level is moderate to high.  He notes his marriage is a stressor.  He does report he has been meditating, 20 mins 2x daily, which has helped his stress and anxiety level.             Patient involved in Goal setting?: Yes      Signature: _____________________________________________________________    Date: __________________    Time: __________________

## 2021-06-14 NOTE — PROGRESS NOTES
Review of systems done with patient over the phone. Positive for muscle weakness, joint pain. All other review of systems within normal limits. He was unable to get his vitals today.

## 2021-06-14 NOTE — PROGRESS NOTES
[facility] -  Case Progress Note      Client Name: Jerad Ross  YOB: 1960  MRN:  [id number]  Treating Provider: Julita Wisdom LP  Type of Session: Individual  Present in Session: client  Number of Minutes: 53    Start time:2:00 pm  End time: 2:36 pm  Telemedicine Visit: The patient's condition can be safely assessed and treated via synchronous audio and visual telemedicine encounter.      Reason for Telemedicine Visit: Covid-19    Originating Site (Patient Location): Patient's home    Distant Site (Provider Location): Provider Remote Setting    Consent:  The patient/guardian has verbally consented to: the potential risks and benefits of telemedicine (video visit) versus in person care; bill my insurance or make self-payment for services provided; and responsibility for payment of non-covered services.     Mode of Communication:  Video Conference via  used Doxy    As the provider I attest to compliance with applicable laws and regulations related to telemedicine.        Date of Service:6/08/21   Therapist and patient/guardian/family reviewed the Treatment Plan and goals, agree the plan remains current, accurate, and there are no additions or changes.      Health Maintenance Summary - Mental Health Treatment Plan       Status Date      MENTAL HEALTH TX PLAN Next Due 1/22/2022      Done 2/22/2021 [facility]ENTAL [facility] PLAN SCAN     Done 11/21/2019 [facility] M[facility]AL [facility] PLAN SCAN        Current Symptoms/Status:  Mild depression anxiety, worry, some thoughts and urges to act out sexually, grief and loss, increase in urges more at bedtime, distressed regarding marital relationship, financial distress.    Progress Toward Treatment Goals:   Client reported progress improved mood and maintaining boundaries.    Intervention: Modality and Description:  Provided support and feedback. Used CBT to process thoughts and urges, depression, and health issues.  Client shared that his mood has  been remaining fairly stable.  He shared that he is been able to intervene on urges at nighttime.  He has not had any boundary violations.  He has remaining connected to his support.  He is excited about purchasing a home and working out the financial arrangements for the house.  He does have someone that is going to rent from him which will make a big difference financially.  He shared having to go for his motorcycle riding test and deciding during the test to stop.  He realized that the weight and management of the bike was may be more than he could take on given some of his physical ailments at this time in his life.  Challenged looking at it as a failure rather than on making a decision based on information.  He also shared hearing back from his wife and her comment was why does not he offer her the respect of a divorce rather than the separation.  He shared that he is going to move forward with this but it will be later after he is moved into his house.  We talked about the continued patterns in his relationship and coping with them and accepting the relationship as being over.     Response to Intervention:   Client reported gaining insight and validation.    Assignment:  Work on relationship history.    Interactive Complexity:  There are four specific communication difficulties that complicate the work of the primary psychiatric procedure.  Interactive complexity (+[id number]) may be reported when at least one of these difficulties is present.    Communication difficulties present during current the psychiatric procedure include:  1. None.    Diagnosis:  Encounter Diagnoses   Name Primary?     Mild episode of recurrent major depressive disorder (H) Yes     Other Specified Disruptive, Impulse-Control, and Conduct Disorder          Plan / Need for Future Services:  Return for therapy in 2 weeks.      Julita Wisdom Psyd,  LP

## 2021-06-14 NOTE — PATIENT INSTRUCTIONS - HE
Mr. Ross,  Thank you for taking my video conference today.  I am glad to hear you are doing so well.  I see where you mention to my nursing assistant that you are getting some muscle aches and pains.  If they continue let me know and we may need to back off on some of your medicines.  I will plan on seeing you in June, at that time we most likely will discontinue metoprolol.  Stay safe and best for the holidays.  Sascha Lundberg

## 2021-06-20 NOTE — LETTER
Letter by Joaquina Donald CNP at      Author: Joaquina Donald CNP Service: -- Author Type: --    Filed:  Encounter Date: 6/18/2020 Status: (Other)         Patient: Jerad Ross   MR Number: [id number]   YOB: 1960   Date of Visit: 6/18/2020     Pain management [facility]    Facility:   [facility] [[id number]]   Code Status: FULL CODE      CHIEF COMPLAINT/REASON FOR VISIT:  Chief Complaint   Patient presents with   ? Problem Visit     monitor labs       HISTORY:      HPI: Jerad is a 60 y.o. male undergoing physical and occupational therapy at [facility]. He is with past medical history of  end-stage kidney disease status post bilateral kidney transplant, and known coronary artery disease. He was hospitalized 6/2-6/13 following a MI.  Patient underwent coronary angiogram and following the angiographic finding, patient was referred to CV surgery for evaluation for possible coronary revascularization.   Patient was admitted in the hospital for Plavix washout and on June 8, 2020, following inpatient optimization, patient was taken to the operative room where he underwent coronary artery bypass graft x3, with a full individual grafts; left internal mammary artery to the left anterior descending coronary artery, reverse saphenous vein graft to the lateral branch of right coronary artery, reverse saphenous vein graft to the posterior descending branch of right coronary artery, greater saphenous vein procurement from the left lower extremity using endoscopic vein harvest technique, sternal closure ZACH topete.     Today he is seen for follow-up to dizziness and to review labs.  Today he tells me he is feeling much better.  His blood pressure is still on the softer side 112/77.  His hemoglobin has come up to 12.7 WBC 10.9 and his potassium has increased from 3.2-4.1.  He denied chest pain or shortness of breath.  He reports he is urinating well his weights were  reviewed and he is down approximately 5 pounds.  His Covid test is still pending.  He has no lower extremity edema.. He denied constipation or diarrhea he continues on sternal precautions.    Past Medical History:   Diagnosis Date   ? Acute midline low back pain without sciatica    ? AION (acute ischemic optic neuropathy)    ? Anemia in chronic renal disease    ? Avascular necrosis of bones of both hips (H)     s/p bilateral hip replacements   ? Basal cell carcinoma    ? Chronic hepatitis B (H)    ? Clostridium difficile colitis    ? Coronary artery disease involving native coronary artery of native heart without angina pectoris 06/17/2014    Coronary angiogram 6/17/14: Severe distal 3-vessel disease involving small vessels, not amenable to PCI or CABG   ? Depression    ? Diverticulosis    ? Dyslipidemia    ? Elevated C-reactive protein (CRP)    ? FSGS (focal segmental glomerulosclerosis)    ? Gastric ulcer with hemorrhage 02/12/2012   ? GERD (gastroesophageal reflux disease)    ? Glaucoma    ? Hemorrhoids    ? HTN (hypertension)    ? Hyperlipidemia    ? Hypogonadism in male    ? Kidney transplanted     focal glomerulosclerosis    ? NSTEMI (non-ST elevated myocardial infarction) (H) 06/17/2014   ? JULIANE (obstructive sleep apnea)    ? Paracentral scotoma     LE   ? Secondary renal hyperparathyroidism (H)    ? Septic bursitis    ? Squamous cell carcinoma              Family History   Problem Relation Age of Onset   ? Other Father         AI with valve repair   ? Hypertension Father    ? Kidney disease Father    ? Other Maternal Grandfather         cerebrovascular disease   ? Ovarian cancer Paternal Grandmother    ? Kidney disease Paternal Aunt      Social History     Socioeconomic History   ? Marital status:      Spouse name: Not on file   ? Number of children: Not on file   ? Years of education: Not on file   ? Highest education level: Not on file   Occupational History   ? Not on file   Social Needs   ?  Financial resource strain: Not on file   ? Food insecurity     Worry: Not on file     Inability: Not on file   ? Transportation needs     Medical: Not on file     Non-medical: Not on file   Tobacco Use   ? Smoking status: Former Smoker     Packs/day: 1.00     Years: 9.00     Pack years: 9.00     Last attempt to quit: 1988     Years since quittin.4   ? Smokeless tobacco: Former User   Substance and Sexual Activity   ? Alcohol use: Yes     Frequency: Monthly or less   ? Drug use: Never   ? Sexual activity: Not on file   Lifestyle   ? Physical activity     Days per week: Not on file     Minutes per session: Not on file   ? Stress: Not on file   Relationships   ? Social connections     Talks on phone: Not on file     Gets together: Not on file     Attends Oriental orthodox service: Not on file     Active member of club or organization: Not on file     Attends meetings of clubs or organizations: Not on file     Relationship status: Not on file   ? Intimate partner violence     Fear of current or ex partner: Not on file     Emotionally abused: Not on file     Physically abused: Not on file     Forced sexual activity: Not on file   Other Topics Concern   ? Not on file   Social History Narrative   ? Not on file         Review of Systems   Constitutional: Positive for fatigue. Negative for activity change, appetite change, chills and fever.   HENT: Negative for congestion and sore throat.    Eyes: Negative for visual disturbance.   Respiratory: Negative for cough, shortness of breath and wheezing.    Cardiovascular: Negative for chest pain and leg swelling.   Gastrointestinal: Negative for abdominal distention, abdominal pain, constipation, diarrhea and nausea.   Genitourinary: Negative for dysuria.   Musculoskeletal: Negative for arthralgias and myalgias.   Skin: Positive for wound. Negative for color change and rash.   Neurological: Negative for weakness and numbness.   Psychiatric/Behavioral: Negative for agitation,  behavioral problems and sleep disturbance.       Vitals:    06/18/20 0643   BP: 112/77   Pulse: 71   Resp: 14   Temp: 97.3  F (36.3  C)   SpO2: 97%   Weight: 163 lb 9.6 oz (74.2 kg)       Physical Exam  Constitutional:       Appearance: He is well-developed.   HENT:      Head: Normocephalic.   Eyes:      Conjunctiva/sclera: Conjunctivae normal.   Neck:      Musculoskeletal: Normal range of motion.   Cardiovascular:      Rate and Rhythm: Normal rate and regular rhythm.      Heart sounds: Normal heart sounds. No murmur.   Pulmonary:      Effort: No respiratory distress.      Breath sounds: Rales present. No wheezing.      Comments: Crackles  left lower lobe  Abdominal:      General: Bowel sounds are normal. There is no distension.      Palpations: Abdomen is soft.      Tenderness: There is no abdominal tenderness.   Musculoskeletal: Normal range of motion.   Skin:     General: Skin is warm.      Comments: Midline chest incision with CT sites/C/D/I    dermabond incision with bruising left medial leg.    Neurological:      Mental Status: He is alert and oriented to person, place, and time.      Coordination: Coordination abnormal.   Psychiatric:         Behavior: Behavior normal.           LABS:   Recent Results (from the past 240 hour(s))   POCT Glucose    Specimen: Blood   Result Value Ref Range    Glucose 117 70 - 139 mg/dL   POCT Glucose    Specimen: Blood   Result Value Ref Range    Glucose 106 70 - 139 mg/dL   Hemoglobin   Result Value Ref Range    Hemoglobin 6.6 (LL) 14.0 - 18.0 g/dL   Platelet Count - DO NOT remove unless platelet count already ordered by other source   Result Value Ref Range    Platelets 134 (L) 140 - 440 thou/uL   Basic Metabolic Panel   Result Value Ref Range    Sodium 133 (L) 136 - 145 mmol/L    Potassium 4.3 3.5 - 5.0 mmol/L    Chloride 99 98 - 107 mmol/L    CO2 26 22 - 31 mmol/L    Anion Gap, Calculation 8 5 - 18 mmol/L    Glucose 92 70 - 125 mg/dL    Calcium 8.1 (L) 8.5 - 10.5 mg/dL     BUN 19 8 - 22 mg/dL    Creatinine 0.85 0.70 - 1.30 mg/dL    GFR MDRD Af Amer >60 >60 mL/min/1.73m2    GFR MDRD Non Af Amer >60 >60 mL/min/1.73m2   Magnesium   Result Value Ref Range    Magnesium 2.3 1.8 - 2.6 mg/dL   Platelet Product Information   Result Value Ref Range    Status Canceled     Component Platelets    POCT Glucose    Specimen: Blood   Result Value Ref Range    Glucose 96 70 - 139 mg/dL   POCT Glucose    Specimen: Blood   Result Value Ref Range    Glucose 105 70 - 139 mg/dL   POCT Glucose    Specimen: Blood   Result Value Ref Range    Glucose 95 70 - 139 mg/dL   POCT Glucose    Specimen: Blood   Result Value Ref Range    Glucose 87 70 - 139 mg/dL   Crossmatch   Result Value Ref Range    Crossmatch Compatible     Unit Type O Pos     Unit Number [id number]     Status Released     Component Red Blood Cells     PRODUCT CODE L8749S32     Blood Type 5100     CODING SYSTEM [id number]    Crossmatch   Result Value Ref Range    Crossmatch Compatible     Unit Type O Pos     Unit Number [id number]     Status Transfused     Component Red Blood Cells     PRODUCT CODE [id number]     Issue Date and Time [id number]     Blood Type 5100     CODING SYSTEM [id number]    Crossmatch   Result Value Ref Range    Crossmatch Compatible     Unit Type O Pos     Unit Number [id number]     Status Transfused     Component Red Blood Cells     PRODUCT CODE Q9344S01     Issue Date and Time [id number]     Blood Type 5100     CODING SYSTEM [id number]    Basic Metabolic Panel   Result Value Ref Range    Sodium 134 (L) 136 - 145 mmol/L    Potassium 3.0 (L) 3.5 - 5.0 mmol/L    Chloride 96 (L) 98 - 107 mmol/L    CO2 27 22 - 31 mmol/L    Anion Gap, Calculation 11 5 - 18 mmol/L    Glucose 71 70 - 125 mg/dL    Calcium 8.4 (L) 8.5 - 10.5 mg/dL    BUN 20 8 - 22 mg/dL    Creatinine 0.83 0.70 - 1.30 mg/dL    GFR MDRD Af Amer >60 >60 mL/min/1.73m2    GFR MDRD Non Af Amer >60 >60 mL/min/1.73m2   Hemoglobin   Result Value Ref Range    Hemoglobin  10.0 (L) 14.0 - 18.0 g/dL   Magnesium   Result Value Ref Range    Magnesium 1.9 1.8 - 2.6 mg/dL   POCT Glucose    Specimen: Blood   Result Value Ref Range    Glucose 76 70 - 139 mg/dL   POCT Glucose    Specimen: Blood   Result Value Ref Range    Glucose 97 70 - 139 mg/dL   Magnesium   Result Value Ref Range    Magnesium 2.4 1.8 - 2.6 mg/dL   Potassium   Result Value Ref Range    Potassium 3.8 3.5 - 5.0 mmol/L   POCT Glucose    Specimen: Blood   Result Value Ref Range    Glucose 89 70 - 139 mg/dL   POCT Glucose    Specimen: Blood   Result Value Ref Range    Glucose 96 70 - 139 mg/dL   Hemoglobin   Result Value Ref Range    Hemoglobin 9.8 (L) 14.0 - 18.0 g/dL   Platelet Count - DO NOT remove unless platelet count already ordered by other source   Result Value Ref Range    Platelets 200 140 - 440 thou/uL   Basic Metabolic Panel   Result Value Ref Range    Sodium 136 136 - 145 mmol/L    Potassium 3.7 3.5 - 5.0 mmol/L    Chloride 96 (L) 98 - 107 mmol/L    CO2 32 (H) 22 - 31 mmol/L    Anion Gap, Calculation 8 5 - 18 mmol/L    Glucose 94 70 - 125 mg/dL    Calcium 8.6 8.5 - 10.5 mg/dL    BUN 18 8 - 22 mg/dL    Creatinine 0.88 0.70 - 1.30 mg/dL    GFR MDRD Af Amer >60 >60 mL/min/1.73m2    GFR MDRD Non Af Amer >60 >60 mL/min/1.73m2   Magnesium   Result Value Ref Range    Magnesium 2.2 1.8 - 2.6 mg/dL   POCT Glucose    Specimen: Blood   Result Value Ref Range    Glucose 92 70 - 139 mg/dL   POCT Glucose    Specimen: Blood   Result Value Ref Range    Glucose 136 70 - 139 mg/dL   POCT Glucose    Specimen: Blood   Result Value Ref Range    Glucose 132 70 - 139 mg/dL   POCT Glucose    Specimen: Blood   Result Value Ref Range    Glucose 124 70 - 139 mg/dL   Basic Metabolic Panel   Result Value Ref Range    Sodium 137 136 - 145 mmol/L    Potassium 3.2 (L) 3.5 - 5.0 mmol/L    Chloride 96 (L) 98 - 107 mmol/L    CO2 31 22 - 31 mmol/L    Anion Gap, Calculation 10 5 - 18 mmol/L    Glucose 83 70 - 125 mg/dL    Calcium 8.8 8.5 - 10.5 mg/dL     BUN 17 8 - 22 mg/dL    Creatinine 0.76 0.70 - 1.30 mg/dL    GFR MDRD Af Amer >60 >60 mL/min/1.73m2    GFR MDRD Non Af Amer >60 >60 mL/min/1.73m2   Magnesium   Result Value Ref Range    Magnesium 1.9 1.8 - 2.6 mg/dL   POCT Glucose    Specimen: Blood   Result Value Ref Range    Glucose 86 70 - 139 mg/dL   POCT Glucose    Specimen: Blood   Result Value Ref Range    Glucose 144 (H) 70 - 139 mg/dL   HM2(CBC w/o Differential)   Result Value Ref Range    WBC 10.9 4.0 - 11.0 thou/uL    RBC 4.45 4.40 - 6.20 mill/uL    Hemoglobin 12.7 (L) 14.0 - 18.0 g/dL    Hematocrit 40.4 40.0 - 54.0 %    MCV 91 80 - 100 fL    MCH 28.5 27.0 - 34.0 pg    MCHC 31.4 (L) 32.0 - 36.0 g/dL    RDW 15.6 (H) 11.0 - 14.5 %    Platelets 524 (H) 140 - 440 thou/uL    MPV 10.2 8.5 - 12.5 fL   Basic Metabolic Panel   Result Value Ref Range    Sodium 134 (L) 136 - 145 mmol/L    Potassium 4.1 3.5 - 5.0 mmol/L    Chloride 97 (L) 98 - 107 mmol/L    CO2 28 22 - 31 mmol/L    Anion Gap, Calculation 9 5 - 18 mmol/L    Glucose 106 70 - 125 mg/dL    Calcium 9.6 8.5 - 10.5 mg/dL    BUN 24 (H) 8 - 22 mg/dL    Creatinine 0.96 0.70 - 1.30 mg/dL    GFR MDRD Af Amer >60 >60 mL/min/1.73m2    GFR MDRD Non Af Amer >60 >60 mL/min/1.73m2     Current Outpatient Medications   Medication Sig   ? acetaminophen (TYLENOL) 325 MG tablet Take 2 tablets (650 mg total) by mouth every 4 (four) hours as needed for pain or fever.   ? aspirin 81 MG EC tablet Take 81 mg by mouth daily.   ? calcium citrate-vitamin D (CITRACAL+D) 315-200 mg-unit per tablet Take 1 tablet by mouth daily.   ? clopidogrel (PLAVIX) 75 mg tablet Take 75 mg by mouth daily.   ? docusate sodium (COLACE) 100 MG capsule Take 1 capsule (100 mg total) by mouth daily.   ? entecavir (BARACLUDE) 0.5 MG tablet Take 0.5 mg by mouth daily.   ? FLUoxetine (PROZAC) 10 MG tablet Take 10 mg by mouth daily. Take with 1 tablet with the 40mg to make it 50mg daily.   ? FLUoxetine (PROZAC) 40 MG capsule Take 40 mg by mouth daily.   ?  furosemide (LASIX) 40 MG tablet Take 1 tablet (40 mg total) by mouth 2 (two) times a day at 9am and 6pm for 5 days.   ? latanoprost (XALATAN) 0.005 % ophthalmic solution Administer 1 drop to both eyes at bedtime.   ? magnesium oxide (MAG-OX) 400 mg (241.3 mg magnesium) tablet Take 1 tablet (400 mg total) by mouth daily for 5 days.   ? metoprolol tartrate (LOPRESSOR) 25 MG tablet Take 0.5 tablets (12.5 mg total) by mouth 2 (two) times a day.   ? mycophenolate (CELLCEPT) 250 mg capsule Take 750 mg by mouth 2 (two) times a day.   ? nitroglycerin (NITROSTAT) 0.4 MG SL tablet Place 1 tablet (0.4 mg total) under the tongue every 5 (five) minutes as needed for chest pain.   ? OMEGA-3 FATTY ACIDS ORAL Take 1 capsule by mouth daily.   ? omeprazole (PRILOSEC OTC) 20 MG tablet Take 20 mg by mouth daily before breakfast.   ? ONE DAILY MULTIVITAMIN ORAL Take 1 tablet by mouth daily.   ? polyethylene glycol (MIRALAX) 17 gram packet Take 1 packet (17 g total) by mouth daily as needed (Constipation).   ? predniSONE (DELTASONE) 5 MG tablet Take 5 mg by mouth daily.   ? rosuvastatin (CRESTOR) 20 MG tablet Take 1 tablet (20 mg total) by mouth at bedtime.   ? traMADoL (ULTRAM) 50 mg tablet Take 1 tablet (50 mg total) by mouth every 6 (six) hours as needed for pain.     ASSESSMENT:      ICD-10-CM    1. Essential hypertension  I10    2. NSTEMI (non-ST elevated myocardial infarction) (H)  I21.4    3. Pain management  R52    4. Debility  R53.81        PLAN:   Fatigue Hemoglobin improved to 12.7    Non-STEMI status post triple bypass PT OT    End-stage renal disease patient with history of bilateral kidney transplant, on CellCept    Chronic viral hepatitis B on entecavir     Hypertension on metoprolol tartrate 12.5 mg twice daily  BP currently soft at 104/72.       physical debility PT OT    Pain management PRN regular strength Tylenol and tramadol as needed    Hypokalemia Patient recently replaced and potassium now 4.1    Electronically  signed by: Joaquina Donald, JADON

## 2021-06-20 NOTE — LETTER
Letter by Geronimo Strickland MD at      Author: Geronimo Strickland MD Service: -- Author Type: --    Filed:  Encounter Date: 6/26/2020 Status: (Other)         Patient: Jerad Ross   MR Number: [id number]   YOB: 1960   Date of Visit: 6/26/2020      [facility]    Facility:  [facility] [[id number]]    Code Status:  FULL CODE    Chief Complaint   Patient presents with   ? Review Of Multiple Medical Conditions   :                    Patient Active Problem List   Diagnosis   ? Chest pain   ? Chronic viral hepatitis B without delta agent and without coma (H)   ? Coronary artery disease involving native coronary artery of native heart without angina pectoris   ? S/P kidney transplant   ? NSTEMI (non-ST elevated myocardial infarction) (H)   ? HTN (hypertension)   ? Abnormal cardiovascular stress test   ? Unstable angina (H)   ? CAD (coronary artery disease)   ? End stage renal disease with bilateral renal transplant       History:  Jerad Ross  is a 60 year old male with history of end-stage renal disease, status post bilateral renal transplants (glomerular sclerosis), secondary hyperparathyroidism, chronic immunosuppression and steroid dependence, chronic hepatitis B, GERD, gastric ulcer/GI bleed, glaucoma, coronary artery disease including non-STEMI in 2014 and recent CABG, dyslipidemia, colectomy due to diverticulosis/diverticulitis, bilateral total hip arthroplasties, bilateral hip arthroplasty revision, splenectomy, appendectomy, tonsillectomy, cataracts, history of C. difficile infection seen for review of his multiple medical issues on 6/26/2020    Hospital Course: Patient was admitted on June 2 has discharged on June 13.  He presented with unstable angina/acute MI.  Angiogram revealed severe multivessel artery disease with recommendation for bypass procedure performed by Dr. Arora on June 8 with Dr. Lundberg following from cardiology.  Patient  recuperated in the ICU was extubated on arrival required phenylephrine and Lasix drips but did quite well with removal of chest tubes by postop day #3.    TCU was recommended for strengthening/rehabilitation      Facility course:  Patient's initial days at the TCU were complicated by hypotension leading to early discontinuation of his furosemide, as needed holding of metoprolol.  This recovered spontaneously with time and better p.o. intake.    His initial chest pain continue to improve with tapering of the tramadol to scheduled acetaminophen.    Patient tolerated his medication program well    Patient participated well with PT OT,  involved.    Subjective/ROS:    -augmented by discussion with facility staff involved in direct care    Patient says that he is had a very good week since I seen him.  His pain has continued to diminish.  His ability to exercise has increased.  He notes that prior to surgery he often felt a sense of palpitations and he says he has had none of that since the surgery.  Furthermore his chest pain is improved.  He can still feel with certain movements or cough.  He has had no leg swelling.  His appetite is coming back really quite nicely.  His bowels are working as is his bladder.  No falls or injuries no new problems.    He has follow-up July 21 for an appointment and June 30 for an appointment the near 1 is with Krupa Rodriguez.    Remainder ROS negative    Past Medical History:   Diagnosis Date   ? Acute midline low back pain without sciatica    ? AION (acute ischemic optic neuropathy)    ? Anemia in chronic renal disease    ? Avascular necrosis of bones of both hips (H)     s/p bilateral hip replacements   ? Basal cell carcinoma    ? Chronic hepatitis B (H)    ? Clostridium difficile colitis    ? Coronary artery disease involving native coronary artery of native heart without angina pectoris 06/17/2014    Coronary angiogram 6/17/14: Severe distal 3-vessel disease involving  small vessels, not amenable to PCI or CABG   ? Depression    ? Diverticulosis    ? Dyslipidemia    ? Elevated C-reactive protein (CRP)    ? FSGS (focal segmental glomerulosclerosis)    ? Gastric ulcer with hemorrhage 02/12/2012   ? GERD (gastroesophageal reflux disease)    ? Glaucoma    ? Hemorrhoids    ? HTN (hypertension)    ? Hyperlipidemia    ? Hypogonadism in male    ? Kidney transplanted     focal glomerulosclerosis    ? NSTEMI (non-ST elevated myocardial infarction) (H) 06/17/2014   ? JULIANE (obstructive sleep apnea)    ? Paracentral scotoma     LE   ? Secondary renal hyperparathyroidism (H)    ? Septic bursitis    ? Squamous cell carcinoma      Past Surgical History:   Procedure Laterality Date   ? APPENDECTOMY     ? CATARACT EXTRACTION EXTRACAPSULAR W/ INTRAOCULAR LENS IMPLANTATION Bilateral 4-20-10, 6-1-10   ? CATARACT EXTRACTION W/  INTRAOCULAR LENS IMPLANT Right 04/19/2000   ? CATARACT EXTRACTION W/  INTRAOCULAR LENS IMPLANT Left 06/01/2000   ? COLONOSCOPY  02/13/2012   ? CV CORONARY ANGIOGRAM N/A 6/2/2020    Procedure: Coronary Angiogram;  Surgeon: Alona Florentino MD;  Location: [facility] Cath Lab;  Service: Cardiology   ? CV LEFT HEART CATHETERIZATION WO LEFT VETRICULOGRAM Left 6/2/2020    Procedure: Left Heart Catheterization Without Left Ventriculogram;  Surgeon: Alona Florentino MD;  Location: [facility] Cath Lab;  Service: Cardiology   ? ESOPHAGOGASTRODUODENOSCOPY  02/13/2012   ? HERNIA REPAIR  1995    Lt inguinal   ? HIP SURGERY  1981    bilat MITZI, revised 2001, 2005   ? KIDNEY SURGERY  1978, 1993    transplant   ? kidney transplant      1978, 1993   ? KNEE SURGERY Bilateral 1983, 1987   ? MOHS SURGERY     ? 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, auxiliary spleen   ? SUBTOTAL COLECTOMY  1983     10 cm, diverticulitis    ? TONSILLECTOMY            Family History   Problem Relation Age of Onset   ? Other Father         AI with valve repair   ? Hypertension Father    ? Kidney disease Father    ? Other  Maternal Grandfather         cerebrovascular disease   ? Ovarian cancer Paternal Grandmother    ? Kidney disease Paternal Aunt    :       Social History     Socioeconomic History   ? Marital status:      Spouse name: Not on file   ? Number of children: Not on file   ? Years of education: Not on file   ? Highest education level: Not on file   Occupational History   ? Not on file   Social Needs   ? Financial resource strain: Not on file   ? Food insecurity     Worry: Not on file     Inability: Not on file   ? Transportation needs     Medical: Not on file     Non-medical: Not on file   Tobacco Use   ? Smoking status: Former Smoker     Packs/day: 1.00     Years: 9.00     Pack years: 9.00     Last attempt to quit: 1988     Years since quittin.5   ? Smokeless tobacco: Former User   Substance and Sexual Activity   ? Alcohol use: Yes     Frequency: Monthly or less   ? Drug use: Never   ? Sexual activity: Not on file   Lifestyle   ? Physical activity     Days per week: Not on file     Minutes per session: Not on file   ? Stress: Not on file   Relationships   ? Social connections     Talks on phone: Not on file     Gets together: Not on file     Attends Yarsani service: Not on file     Active member of club or organization: Not on file     Attends meetings of clubs or organizations: Not on file     Relationship status: Not on file   ? Intimate partner violence     Fear of current or ex partner: Not on file     Emotionally abused: Not on file     Physically abused: Not on file     Forced sexual activity: Not on file   Other Topics Concern   ? Not on file   Social History Narrative   ? Not on file   :    Social history: Patient says [facility] and has onsite support there upon discharge.    Current Outpatient Medications on File Prior to Visit   Medication Sig Dispense Refill   ? acetaminophen (TYLENOL) 325 MG tablet Take 2 tablets (650 mg total) by mouth  every 4 (four) hours as needed for pain or fever. 20 tablet 0   ? aspirin 81 MG EC tablet Take 81 mg by mouth daily.     ? calcium citrate-vitamin D (CITRACAL+D) 315-200 mg-unit per tablet Take 1 tablet by mouth daily.     ? clopidogrel (PLAVIX) 75 mg tablet Take 75 mg by mouth daily.     ? docusate sodium (COLACE) 100 MG capsule Take 1 capsule (100 mg total) by mouth daily. 30 capsule 0   ? entecavir (BARACLUDE) 0.5 MG tablet Take 0.5 mg by mouth daily.     ? FLUoxetine (PROZAC) 10 MG tablet Take 10 mg by mouth daily. Take with 1 tablet with the 40mg to make it 50mg daily.     ? FLUoxetine (PROZAC) 40 MG capsule Take 40 mg by mouth daily.     ? furosemide (LASIX) 40 MG tablet Take 1 tablet (40 mg total) by mouth 2 (two) times a day at 9am and 6pm for 5 days. 5 tablet 0   ? latanoprost (XALATAN) 0.005 % ophthalmic solution Administer 1 drop to both eyes at bedtime.     ? metoprolol tartrate (LOPRESSOR) 25 MG tablet Take 0.5 tablets (12.5 mg total) by mouth 2 (two) times a day. 30 tablet 0   ? mycophenolate (CELLCEPT) 250 mg capsule Take 750 mg by mouth 2 (two) times a day.     ? nitroglycerin (NITROSTAT) 0.4 MG SL tablet Place 1 tablet (0.4 mg total) under the tongue every 5 (five) minutes as needed for chest pain. 30 tablet 0   ? OMEGA-3 FATTY ACIDS ORAL Take 1 capsule by mouth daily.     ? omeprazole (PRILOSEC OTC) 20 MG tablet Take 20 mg by mouth daily before breakfast.     ? ONE DAILY MULTIVITAMIN ORAL Take 1 tablet by mouth daily.     ? polyethylene glycol (MIRALAX) 17 gram packet Take 1 packet (17 g total) by mouth daily as needed (Constipation). 20 packet 0   ? predniSONE (DELTASONE) 5 MG tablet Take 5 mg by mouth daily.     ? rosuvastatin (CRESTOR) 20 MG tablet Take 1 tablet (20 mg total) by mouth at bedtime. 30 tablet 3   ? traMADoL (ULTRAM) 50 mg tablet Take 1 tablet (50 mg total) by mouth every 6 (six) hours as needed for pain. 18 tablet 0     No current facility-administered medications on file prior to  visit.    :      ALLERGIES:  Penicillins; Cephalexin; Sulfa (sulfonamide antibiotics); and Tetracycline    Vitals:      Current Vitals   BP: 120/72 mmHg  6/26/2020 07:17  Temp:98.3  F  6/26/2020 07:17  Pulse: 65 bpm  6/26/2020 07:17  Weight: 163.3 Lbs  6/26/2020 11:29  Resp: 16 Breaths/min  6/26/2020 07:17  BS:  O2: 95 %  6/26/2020 07:17  Pain: 0  6/26/2020 07:47  Weight is stable since I saw him last week.  5 pounds down from admit weight.  There is no height or weight on file to calculate BMI.    Physical exam:    General:  Alert  oriented x3 appears in no acute distress    He is up and about walking without assistance today.  He is pleasant normally conversant normocephalic/atraumatic with good range of motion of his neck.  Gait appears stable in the short distance he walks inside his room.  He is able to use both arms and hands productively retrieving paperwork etc.  He has a bandage on his left shin which she says he picked the lesion off of.  He says he is got many keratoses which he will sometimes pick and bleed.  He has no edema.  Linear bruising pattern on the left ankle fading.    Due to the 2020 Covid 19 pandemic, except as noted above, the patient was visually observed at a 6 foot plus distance.  An observational exam was performed in an effort to keep patient safe from Covid 19 and other communicable diseases.   Labs:  Lab Results   Component Value Date    WBC 6.9 06/26/2020    HGB 11.9 (L) 06/26/2020    HCT 39.4 (L) 06/26/2020    MCV 95 06/26/2020     (H) 06/26/2020     Results for orders placed or performed in visit on 06/26/20   Basic Metabolic Panel   Result Value Ref Range    Sodium 140 136 - 145 mmol/L    Potassium 3.6 3.5 - 5.0 mmol/L    Chloride 104 98 - 107 mmol/L    CO2 24 22 - 31 mmol/L    Anion Gap, Calculation 12 5 - 18 mmol/L    Glucose 88 70 - 125 mg/dL    Calcium 9.5 8.5 - 10.5 mg/dL    BUN 10 8 - 22 mg/dL    Creatinine 0.71 0.70 - 1.30 mg/dL    GFR MDRD Af Amer >60 >60  mL/min/1.73m2    GFR MDRD Non Af Amer >60 >60 mL/min/1.73m2         No results found for: TSH  Lab Results   Component Value Date    HGBA1C 5.6 06/03/2020     [unfilled]  No results found for: SXXQTBLT28  No results found for: BNP  [unfilled]  Most Recent EKG     Units 06/08/20  1336   VENTRATE BPM 97   ATRIALRATE BPM 97   QRSDURATION ms 134   QTINTERVAL ms 440   QTCCALC ms 558   P [place] degrees 28   RAXIS degrees -25   TAXIS degrees -7   MUSEDX  Normal sinus rhythm  Right bundle branch block  Inferior infarct , age undetermined  Anterolateral infarct , age undetermined  Abnormal ECG  When compared with ECG of 28-MAY-2020 12:19,  Significant changes have occurred  Confirmed by YANDY TREVINO, BRET LOC: ([id number]) on 6/8/2020 3:42:01 PM         Assessment/Plan:      ICD-10-CM    1. End stage renal disease with bilateral renal transplant  N18.6    2. Chronic viral hepatitis B without delta agent and without coma (H)  B18.1    3. Coronary artery disease involving native coronary artery of native heart without angina pectoris  I25.10    4. NSTEMI (non-ST elevated myocardial infarction) (H)  I21.4    5. Essential hypertension  I10        Coronary artery disease  CABG x3 June 8, 2020   VIEYRA to LAD   R SVG to PL   R SVG to PDA  EVH from LLE  History of non-STEMI 2014, 2020   Patient is quite satisfied with his improvement over the last week as a.m.  His wounds continue to heal well both by his report and by staff report.  His post sternotomy pain is improving nicely and is getting by with Tylenol nicely although he still has access to tramadol.    - Dual antiplatelets aspirin plus Plavix in place at this time  - Crestor 20 mg daily  - Lasix has been discontinued  - Doing a good job tapering to plain acetaminophen.  Anticipate that he will not need tramadol on discharge  - Metoprolol 12.5 mg twice daily.  Blood pressure has improved since I saw him.  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  Patient's kidney function held up well.  GFR estimated greater than 60 on 6/16/2020 with a creatinine 0.96  -Avoid nephrotoxins  -Patient is not on directed osteoporosis therapy.  He thinks he might of been on Fosamax at one point.  Unclear if he has been lost to follow-up?  I strongly encouraged him to follow-up with his transplant clinic with a question for what if anything he should be doing for bone health.  Continue calcium and vitamin D in the meanwhile  -Patient reports that splenectomy was done at the time of his transplant and felt to be an important part of the success at that time in history.  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    GERD  History of gastric ulcer  History of upper GI bleed   Continue omeprazole.  Patient is asymptomatic at this time    Dyslipidemia   Crestor as above    Glaucoma   No change in his latanoprost    Subtotal colectomy   Patient's GI function is returning toward his normal.    JULIANE   Untreated.  Patient says he had CPAP in the past but does not use it.    Low blood pressures   Blood pressures have improved.  He is tolerating the metoprolol well.    Chronic hepatitis B   Entecavir anticipated lifelong    Disposition: Patient anticipates discharge likely late next week.        discussed with facility staff      Geronimo Strickland MD

## 2021-06-20 NOTE — LETTER
Letter by Joaquina Donald CNP at      Author: Joaquina Donald CNP Service: -- Author Type: --    Filed:  Encounter Date: 6/25/2020 Status: (Other)         Patient: Jerad Ross   MR Number: [id number]   YOB: 1960   Date of Visit: 6/25/2020     Pain management [facility]    Facility:   [facility] [[id number]]   Code Status: FULL CODE      CHIEF COMPLAINT/REASON FOR VISIT:  Chief Complaint   Patient presents with   ? Problem Visit     F/U post bypass, dizziness        HISTORY:      HPI: Jerad is a 60 y.o. male undergoing physical and occupational therapy at [facility]. He is with past medical history of  end-stage kidney disease status post bilateral kidney transplant, and known coronary artery disease. He was hospitalized 6/2-6/13 following a MI.  Patient underwent coronary angiogram and following the angiographic finding, patient was referred to CV surgery for evaluation for possible coronary revascularization.   Patient was admitted in the hospital for Plavix washout and on June 8, 2020, following inpatient optimization, patient was taken to the operative room where he underwent coronary artery bypass graft x3, with a full individual grafts; left internal mammary artery to the left anterior descending coronary artery, reverse saphenous vein graft to the lateral branch of right coronary artery, reverse saphenous vein graft to the posterior descending branch of right coronary artery, greater saphenous vein procurement from the left lower extremity using endoscopic vein harvest technique, sternal closure ZACH topete.     Today he is seen for follow-up to bypass surgery and review of dizziness. He continues to report intermittent dizziness but did not have it during my visit. His BP is improving 117/73 with a pulse of 72   His hemoglobin has come up to 12.7 WBC 10.9 and his potassium has increased from 3.2-4.1.  He denied chest pain or shortness of breath.  He  reports he is urinating well.   He has no lower extremity edema.. He denied constipation or diarrhea he continues on sternal precautions.He tells me his pain is controlled with just tylenol. He feels he is making progress in therapy. His weights were reviewed and are stable.     Past Medical History:   Diagnosis Date   ? Acute midline low back pain without sciatica    ? AION (acute ischemic optic neuropathy)    ? Anemia in chronic renal disease    ? Avascular necrosis of bones of both hips (H)     s/p bilateral hip replacements   ? Basal cell carcinoma    ? Chronic hepatitis B (H)    ? Clostridium difficile colitis    ? Coronary artery disease involving native coronary artery of native heart without angina pectoris 06/17/2014    Coronary angiogram 6/17/14: Severe distal 3-vessel disease involving small vessels, not amenable to PCI or CABG   ? Depression    ? Diverticulosis    ? Dyslipidemia    ? Elevated C-reactive protein (CRP)    ? FSGS (focal segmental glomerulosclerosis)    ? Gastric ulcer with hemorrhage 02/12/2012   ? GERD (gastroesophageal reflux disease)    ? Glaucoma    ? Hemorrhoids    ? HTN (hypertension)    ? Hyperlipidemia    ? Hypogonadism in male    ? Kidney transplanted     focal glomerulosclerosis    ? NSTEMI (non-ST elevated myocardial infarction) (H) 06/17/2014   ? JULIANE (obstructive sleep apnea)    ? Paracentral scotoma     LE   ? Secondary renal hyperparathyroidism (H)    ? Septic bursitis    ? Squamous cell carcinoma              Family History   Problem Relation Age of Onset   ? Other Father         AI with valve repair   ? Hypertension Father    ? Kidney disease Father    ? Other Maternal Grandfather         cerebrovascular disease   ? Ovarian cancer Paternal Grandmother    ? Kidney disease Paternal Aunt      Social History     Socioeconomic History   ? Marital status:      Spouse name: Not on file   ? Number of children: Not on file   ? Years of education: Not on file   ? Highest  education level: Not on file   Occupational History   ? Not on file   Social Needs   ? Financial resource strain: Not on file   ? Food insecurity     Worry: Not on file     Inability: Not on file   ? Transportation needs     Medical: Not on file     Non-medical: Not on file   Tobacco Use   ? Smoking status: Former Smoker     Packs/day: 1.00     Years: 9.00     Pack years: 9.00     Last attempt to quit: 1988     Years since quittin.5   ? Smokeless tobacco: Former User   Substance and Sexual Activity   ? Alcohol use: Yes     Frequency: Monthly or less   ? Drug use: Never   ? Sexual activity: Not on file   Lifestyle   ? Physical activity     Days per week: Not on file     Minutes per session: Not on file   ? Stress: Not on file   Relationships   ? Social connections     Talks on phone: Not on file     Gets together: Not on file     Attends Orthodoxy service: Not on file     Active member of club or organization: Not on file     Attends meetings of clubs or organizations: Not on file     Relationship status: Not on file   ? Intimate partner violence     Fear of current or ex partner: Not on file     Emotionally abused: Not on file     Physically abused: Not on file     Forced sexual activity: Not on file   Other Topics Concern   ? Not on file   Social History Narrative   ? Not on file         Review of Systems   Constitutional: Positive for fatigue. Negative for activity change, appetite change, chills and fever.   HENT: Negative for congestion and sore throat.    Eyes: Negative for visual disturbance.   Respiratory: Negative for cough, shortness of breath and wheezing.    Cardiovascular: Negative for chest pain and leg swelling.   Gastrointestinal: Negative for abdominal distention, abdominal pain, constipation, diarrhea and nausea.   Genitourinary: Negative for dysuria.   Musculoskeletal: Negative for arthralgias and myalgias.   Skin: Positive for wound. Negative for color change and rash.   Neurological:  Negative for weakness and numbness.   Psychiatric/Behavioral: Negative for agitation, behavioral problems and sleep disturbance.       Vitals:    06/25/20 0752   BP: 117/73   Pulse: 72   Resp: 17   Temp: 98.2  F (36.8  C)   SpO2: 98%   Weight: 163 lb 6.4 oz (74.1 kg)       Physical Exam  Constitutional:       Appearance: He is well-developed.   HENT:      Head: Normocephalic.   Eyes:      Conjunctiva/sclera: Conjunctivae normal.   Neck:      Musculoskeletal: Normal range of motion.   Cardiovascular:      Rate and Rhythm: Normal rate and regular rhythm.      Heart sounds: Normal heart sounds. No murmur.   Pulmonary:      Effort: No respiratory distress.      Breath sounds: No wheezing.   Abdominal:      General: Bowel sounds are normal. There is no distension.      Palpations: Abdomen is soft.      Tenderness: There is no abdominal tenderness.   Musculoskeletal: Normal range of motion.   Skin:     General: Skin is warm.      Comments: Midline chest incision with CT sites/C/D/I    dermabond incision with bruising left medial leg.    Neurological:      Mental Status: He is alert and oriented to person, place, and time.   Psychiatric:         Behavior: Behavior normal.           LABS:   Recent Results (from the past 240 hour(s))   HM2(CBC w/o Differential)   Result Value Ref Range    WBC 10.9 4.0 - 11.0 thou/uL    RBC 4.45 4.40 - 6.20 mill/uL    Hemoglobin 12.7 (L) 14.0 - 18.0 g/dL    Hematocrit 40.4 40.0 - 54.0 %    MCV 91 80 - 100 fL    MCH 28.5 27.0 - 34.0 pg    MCHC 31.4 (L) 32.0 - 36.0 g/dL    RDW 15.6 (H) 11.0 - 14.5 %    Platelets 524 (H) 140 - 440 thou/uL    MPV 10.2 8.5 - 12.5 fL   Basic Metabolic Panel   Result Value Ref Range    Sodium 134 (L) 136 - 145 mmol/L    Potassium 4.1 3.5 - 5.0 mmol/L    Chloride 97 (L) 98 - 107 mmol/L    CO2 28 22 - 31 mmol/L    Anion Gap, Calculation 9 5 - 18 mmol/L    Glucose 106 70 - 125 mg/dL    Calcium 9.6 8.5 - 10.5 mg/dL    BUN 24 (H) 8 - 22 mg/dL    Creatinine 0.96 0.70 -  1.30 mg/dL    GFR MDRD Af Amer >60 >60 mL/min/1.73m2    GFR MDRD Non Af Amer >60 >60 mL/min/1.73m2   Basic Metabolic Panel   Result Value Ref Range    Sodium 140 136 - 145 mmol/L    Potassium 3.6 3.5 - 5.0 mmol/L    Chloride 104 98 - 107 mmol/L    CO2 24 22 - 31 mmol/L    Anion Gap, Calculation 12 5 - 18 mmol/L    Glucose 88 70 - 125 mg/dL    Calcium 9.5 8.5 - 10.5 mg/dL    BUN 10 8 - 22 mg/dL    Creatinine 0.71 0.70 - 1.30 mg/dL    GFR MDRD Af Amer >60 >60 mL/min/1.73m2    GFR MDRD Non Af Amer >60 >60 mL/min/1.73m2   HM2(CBC w/o Differential)   Result Value Ref Range    WBC 6.9 4.0 - 11.0 thou/uL    RBC 4.13 (L) 4.40 - 6.20 mill/uL    Hemoglobin 11.9 (L) 14.0 - 18.0 g/dL    Hematocrit 39.4 (L) 40.0 - 54.0 %    MCV 95 80 - 100 fL    MCH 28.8 27.0 - 34.0 pg    MCHC 30.2 (L) 32.0 - 36.0 g/dL    RDW 16.7 (H) 11.0 - 14.5 %    Platelets 635 (H) 140 - 440 thou/uL    MPV 9.9 8.5 - 12.5 fL     Current Outpatient Medications   Medication Sig   ? acetaminophen (TYLENOL) 325 MG tablet Take 2 tablets (650 mg total) by mouth every 4 (four) hours as needed for pain or fever.   ? aspirin 81 MG EC tablet Take 81 mg by mouth daily.   ? calcium citrate-vitamin D (CITRACAL+D) 315-200 mg-unit per tablet Take 1 tablet by mouth daily.   ? clopidogrel (PLAVIX) 75 mg tablet Take 75 mg by mouth daily.   ? docusate sodium (COLACE) 100 MG capsule Take 1 capsule (100 mg total) by mouth daily.   ? entecavir (BARACLUDE) 0.5 MG tablet Take 0.5 mg by mouth daily.   ? FLUoxetine (PROZAC) 10 MG tablet Take 10 mg by mouth daily. Take with 1 tablet with the 40mg to make it 50mg daily.   ? FLUoxetine (PROZAC) 40 MG capsule Take 40 mg by mouth daily.   ? furosemide (LASIX) 40 MG tablet Take 1 tablet (40 mg total) by mouth 2 (two) times a day at 9am and 6pm for 5 days.   ? latanoprost (XALATAN) 0.005 % ophthalmic solution Administer 1 drop to both eyes at bedtime.   ? metoprolol tartrate (LOPRESSOR) 25 MG tablet Take 0.5 tablets (12.5 mg total) by mouth  2 (two) times a day.   ? mycophenolate (CELLCEPT) 250 mg capsule Take 750 mg by mouth 2 (two) times a day.   ? nitroglycerin (NITROSTAT) 0.4 MG SL tablet Place 1 tablet (0.4 mg total) under the tongue every 5 (five) minutes as needed for chest pain.   ? OMEGA-3 FATTY ACIDS ORAL Take 1 capsule by mouth daily.   ? omeprazole (PRILOSEC OTC) 20 MG tablet Take 20 mg by mouth daily before breakfast.   ? ONE DAILY MULTIVITAMIN ORAL Take 1 tablet by mouth daily.   ? polyethylene glycol (MIRALAX) 17 gram packet Take 1 packet (17 g total) by mouth daily as needed (Constipation).   ? predniSONE (DELTASONE) 5 MG tablet Take 5 mg by mouth daily.   ? rosuvastatin (CRESTOR) 20 MG tablet Take 1 tablet (20 mg total) by mouth at bedtime.   ? traMADoL (ULTRAM) 50 mg tablet Take 1 tablet (50 mg total) by mouth every 6 (six) hours as needed for pain.     ASSESSMENT:      ICD-10-CM    1. NSTEMI (non-ST elevated myocardial infarction) (H)  I21.4    2. Essential hypertension  I10    3. Pain management  R52    4. Physical debility  R53.81        PLAN:   Fatigue Hemoglobin improved to 12.7    Non-STEMI status post triple bypass PT OT    End-stage renal disease patient with history of bilateral kidney transplant, on CellCept    Chronic viral hepatitis B on entecavir     Hypertension on metoprolol tartrate 12.5 mg twice daily  BP currently soft at 96/68.       physical debility PT OT    Pain management PRN regular strength Tylenol and tramadol as needed    Intermittent Dizziness- check BMP and CBC. Denied during my visit     Electronically signed by: Joaquina Donald, CNP

## 2021-06-20 NOTE — LETTER
Letter by Joaquina Donald CNP at      Author: Joaquina Donald CNP Service: -- Author Type: --    Filed:  Encounter Date: 6/16/2020 Status: (Other)         Patient: Jerad Ross   MR Number: [id number]   YOB: 1960   Date of Visit: 6/16/2020     Pain management [facility]    Facility:   [facility] [[id number]]   Code Status: FULL CODE      CHIEF COMPLAINT/REASON FOR VISIT:  Chief Complaint   Patient presents with   ? Review Of Multiple Medical Conditions       HISTORY:      HPI: Jerad is a 60 y.o. male undergoing physical and occupational therapy at [facility]. He is with past medical history of  end-stage kidney disease status post bilateral kidney transplant, and known coronary artery disease. He was hospitalized 6/2-6/13 following a MI.  Patient underwent coronary angiogram and following the angiographic finding, patient was referred to CV surgery for evaluation for possible coronary revascularization.   Patient was admitted in the hospital for Plavix washout and on June 8, 2020, following inpatient optimization, patient was taken to the operative room where he underwent coronary artery bypass graft x3, with a full individual grafts; left internal mammary artery to the left anterior descending coronary artery, reverse saphenous vein graft to the lateral branch of right coronary artery, reverse saphenous vein graft to the posterior descending branch of right coronary artery, greater saphenous vein procurement from the left lower extremity using endoscopic vein harvest technique, sternal closure ZACH topete.     Today he is seen for a routine visit to review multiple medical issues and establish care.  He denied chest pain or shortness of breath.  He was noted to have some crackles left lower lobe and chest x-ray was ordered which showed a small effusion  and labs ordered.  he did report having some dizziness and blood pressure low at 104/7010. Patient  checked during my visit for reports of some dizziness and he was 101/73 with a pulse of 80.  Parameters  were placed on his Lasix.  He has no lower extremity edema.. Hemoglobin on 612 was 9.8 he did have a low potassium at 3.2 and replacement ordered.  He continues on sternal precautions.    Past Medical History:   Diagnosis Date   ? Acute midline low back pain without sciatica    ? AION (acute ischemic optic neuropathy)    ? Anemia in chronic renal disease    ? Avascular necrosis of bones of both hips (H)     s/p bilateral hip replacements   ? Basal cell carcinoma    ? Chronic hepatitis B (H)    ? Clostridium difficile colitis    ? Coronary artery disease involving native coronary artery of native heart without angina pectoris 06/17/2014    Coronary angiogram 6/17/14: Severe distal 3-vessel disease involving small vessels, not amenable to PCI or CABG   ? Depression    ? Diverticulosis    ? Dyslipidemia    ? Elevated C-reactive protein (CRP)    ? FSGS (focal segmental glomerulosclerosis)    ? Gastric ulcer with hemorrhage 02/12/2012   ? GERD (gastroesophageal reflux disease)    ? Glaucoma    ? Hemorrhoids    ? HTN (hypertension)    ? Hyperlipidemia    ? Hypogonadism in male    ? Kidney transplanted     focal glomerulosclerosis    ? NSTEMI (non-ST elevated myocardial infarction) (H) 06/17/2014   ? JULIANE (obstructive sleep apnea)    ? Paracentral scotoma     LE   ? Secondary renal hyperparathyroidism (H)    ? Septic bursitis    ? Squamous cell carcinoma              Family History   Problem Relation Age of Onset   ? Other Father         AI with valve repair   ? Hypertension Father    ? Kidney disease Father    ? Other Maternal Grandfather         cerebrovascular disease   ? Ovarian cancer Paternal Grandmother    ? Kidney disease Paternal Aunt      Social History     Socioeconomic History   ? Marital status:      Spouse name: Not on file   ? Number of children: Not on file   ? Years of education: Not on file   ?  Highest education level: Not on file   Occupational History   ? Not on file   Social Needs   ? Financial resource strain: Not on file   ? Food insecurity     Worry: Not on file     Inability: Not on file   ? Transportation needs     Medical: Not on file     Non-medical: Not on file   Tobacco Use   ? Smoking status: Former Smoker     Packs/day: 1.00     Years: 9.00     Pack years: 9.00     Last attempt to quit: 1988     Years since quittin.4   ? Smokeless tobacco: Former User   Substance and Sexual Activity   ? Alcohol use: Yes     Frequency: Monthly or less   ? Drug use: Never   ? Sexual activity: Not on file   Lifestyle   ? Physical activity     Days per week: Not on file     Minutes per session: Not on file   ? Stress: Not on file   Relationships   ? Social connections     Talks on phone: Not on file     Gets together: Not on file     Attends Druze service: Not on file     Active member of club or organization: Not on file     Attends meetings of clubs or organizations: Not on file     Relationship status: Not on file   ? Intimate partner violence     Fear of current or ex partner: Not on file     Emotionally abused: Not on file     Physically abused: Not on file     Forced sexual activity: Not on file   Other Topics Concern   ? Not on file   Social History Narrative   ? Not on file         Review of Systems   Constitutional: Positive for fatigue. Negative for activity change, appetite change, chills and fever.   HENT: Negative for congestion and sore throat.    Eyes: Negative for visual disturbance.   Respiratory: Negative for cough, shortness of breath and wheezing.    Cardiovascular: Negative for chest pain and leg swelling.   Gastrointestinal: Negative for abdominal distention, abdominal pain, constipation, diarrhea and nausea.   Genitourinary: Negative for dysuria.   Musculoskeletal: Negative for arthralgias and myalgias.   Skin: Positive for wound. Negative for color change and rash.    Neurological: Positive for dizziness. Negative for weakness and numbness.   Psychiatric/Behavioral: Negative for agitation, behavioral problems and sleep disturbance.       Vitals:    06/16/20 0809   BP: 104/72   Pulse: 81   Resp: 21   Temp: 97.8  F (36.6  C)   SpO2: 99%   Weight: 165 lb 3.2 oz (74.9 kg)       Physical Exam  Constitutional:       Appearance: He is well-developed.   HENT:      Head: Normocephalic.   Eyes:      Conjunctiva/sclera: Conjunctivae normal.   Neck:      Musculoskeletal: Normal range of motion.   Cardiovascular:      Rate and Rhythm: Normal rate and regular rhythm.      Heart sounds: Normal heart sounds. No murmur.   Pulmonary:      Effort: No respiratory distress.      Breath sounds: Normal breath sounds. No wheezing or rales.   Abdominal:      General: Bowel sounds are normal. There is no distension.      Palpations: Abdomen is soft.      Tenderness: There is no abdominal tenderness.   Musculoskeletal: Normal range of motion.   Skin:     General: Skin is warm.      Comments: Midline chest incision with CT sites/C/D/I    dermabond incision with bruising left medial leg.    Neurological:      Mental Status: He is alert and oriented to person, place, and time.   Psychiatric:         Behavior: Behavior normal.           LABS:   Recent Results (from the past 240 hour(s))   Hemoglobin   Result Value Ref Range    Hemoglobin 14.2 14.0 - 18.0 g/dL   Platelet Count - DO NOT remove unless platelet count already ordered by other source   Result Value Ref Range    Platelets 340 140 - 440 thou/uL   Potassium - Next AM   Result Value Ref Range    Potassium 4.5 3.5 - 5.0 mmol/L   Echo Monitor RAJ   Result Value Ref Range    BSA 1.96 m2    Hieght 68 in    Weight 2,686.4 lbs    /82 mmHg    HR 75 bpm   POCT i-STAT CG8+, Arterial   Result Value Ref Range    POC pH ART 7.44 7.37 - 7.44    POC pCO2 ART 35.7 35.0 - 45.0 mmHg    POC pO2  (H) 80 - 90 mmHg    POC Base Excess Calc 0 mmol/L    POC TCO2  Calc 25 mmol/L    POC HCO3 ART 24.3 23.0 - 29.0 mmol/L    POC O2 Sat Calc  (H) 96 - 97 %    POC Hemoglobin 14.6 14.0 - 18.0 g/dL    POC Hematocrit 43 40.0 - 54.0 %    iSTAT Glucose 87 70 - 125 mg/dL    POC Ionized Calcium Measured 1.19 1.11 - 1.30 mmol/L    POC Sodium 135 (L) 136 - 145 mmol/L    POC Potassium 4.1 3.5 - 5.0 mmol/L   Echo Monitor RAJ   Result Value Ref Range    BSA 1.96 m2    Hieght 68 in    Weight 2,686.4 lbs    /82 mmHg    HR 71 bpm   POCT i-STAT CG8+, Arterial   Result Value Ref Range    POC pH ART 7.29 (L) 7.37 - 7.44    POC pCO2 ART 53.3 (H) 35.0 - 45.0 mmHg    POC pO2 ART 55 (L) 80 - 90 mmHg    POC Base Excess Calc -1 mmol/L    POC TCO2 Calc 27 mmol/L    POC HCO3 ART 25.8 23.0 - 29.0 mmol/L    POC O2 Sat Calc ART 84 (LL) 96 - 97 %    POC Hemoglobin 9.9 (L) 14.0 - 18.0 g/dL    POC Hematocrit 29 (L) 40.0 - 54.0 %    iSTAT Glucose 153 (H) 70 - 125 mg/dL    POC Ionized Calcium Measured 1.02 (L) 1.11 - 1.30 mmol/L    POC Sodium 135 (L) 136 - 145 mmol/L    POC Potassium 4.5 3.5 - 5.0 mmol/L   POCT i-STAT CG8+, Arterial   Result Value Ref Range    POC pH ART 7.46 (H) 7.37 - 7.44    POC pCO2 ART 36.7 35.0 - 45.0 mmHg    POC pO2  (H) 80 - 90 mmHg    POC Base Excess Calc 3 mmol/L    POC TCO2 Calc 27 mmol/L    POC HCO3 ART 26.3 23.0 - 29.0 mmol/L    POC O2 Sat Calc  (H) 96 - 97 %    POC Hemoglobin 9.2 (L) 14.0 - 18.0 g/dL    POC Hematocrit 27 (L) 40.0 - 54.0 %    iSTAT Glucose 159 (H) 70 - 125 mg/dL    POC Ionized Calcium Measured 0.99 (L) 1.11 - 1.30 mmol/L    POC Sodium 135 (L) 136 - 145 mmol/L    POC Potassium 4.5 3.5 - 5.0 mmol/L   POCT i-STAT CG8+, Arterial   Result Value Ref Range    POC pH ART 7.32 (L) 7.37 - 7.44    POC pCO2 ART 49.8 (H) 35.0 - 45.0 mmHg    POC pO2  (H) 80 - 90 mmHg    POC Base Excess Calc 0 mmol/L    POC TCO2 Calc 27 mmol/L    POC HCO3 ART 25.6 23.0 - 29.0 mmol/L    POC O2 Sat Calc  (H) 96 - 97 %    POC Hemoglobin 8.8 (L) 14.0 - 18.0 g/dL     POC Hematocrit 26 (L) 40.0 - 54.0 %    iSTAT Glucose 144 (H) 70 - 125 mg/dL    POC Ionized Calcium Measured 0.97 (L) 1.11 - 1.30 mmol/L    POC Sodium 137 136 - 145 mmol/L    POC Potassium 4.7 3.5 - 5.0 mmol/L   HM2(CBC w/o Differential)   Result Value Ref Range    WBC 16.2 (H) 4.0 - 11.0 thou/uL    RBC 2.79 (L) 4.40 - 6.20 mill/uL    Hemoglobin 8.2 (L) 14.0 - 18.0 g/dL    Hematocrit 26.7 (L) 40.0 - 54.0 %    MCV 96 80 - 100 fL    MCH 29.4 27.0 - 34.0 pg    MCHC 30.7 (L) 32.0 - 36.0 g/dL    RDW 14.8 (H) 11.0 - 14.5 %    Platelets 169 140 - 440 thou/uL    MPV 11.3 8.5 - 12.5 fL   INR   Result Value Ref Range    INR 1.66 (H) 0.90 - 1.10   APTT(PTT)   Result Value Ref Range    PTT 56 (H) 24 - 37 seconds   Fibrinogen   Result Value Ref Range    Fibrinogen 284 170 - 410 mg/dL   POCT i-STAT CG8+, Arterial   Result Value Ref Range    POC pH ART 7.28 (L) 7.37 - 7.44    POC pCO2 ART 44.4 35.0 - 45.0 mmHg    POC pO2 ART 71 (L) 80 - 90 mmHg    POC Base Excess Calc -6 mmol/L    POC TCO2 Calc 22 mmol/L    POC HCO3 ART 21.0 (L) 23.0 - 29.0 mmol/L    POC O2 Sat Calc ART 92 (L) 96 - 97 %    POC Hemoglobin 8.8 (L) 14.0 - 18.0 g/dL    POC Hematocrit 26 (L) 40.0 - 54.0 %    iSTAT Glucose 173 (H) 70 - 125 mg/dL    POC Ionized Calcium Measured 1.27 1.11 - 1.30 mmol/L    POC Sodium 138 136 - 145 mmol/L    POC Potassium 4.2 3.5 - 5.0 mmol/L   POCT Glucose    Specimen: Artery; Blood   Result Value Ref Range    Glucose 227 (H) 70 - 139 mg/dL   Basic metabolic panel   Result Value Ref Range    Sodium 141 136 - 145 mmol/L    Potassium 4.0 3.5 - 5.0 mmol/L    Chloride 109 (H) 98 - 107 mmol/L    CO2 21 (L) 22 - 31 mmol/L    Anion Gap, Calculation 11 5 - 18 mmol/L    Glucose 238 (H) 70 - 125 mg/dL    Calcium 7.6 (L) 8.5 - 10.5 mg/dL    BUN 11 8 - 22 mg/dL    Creatinine 0.75 0.70 - 1.30 mg/dL    GFR MDRD Af Amer >60 >60 mL/min/1.73m2    GFR MDRD Non Af Amer >60 >60 mL/min/1.73m2   INR   Result Value Ref Range    INR 1.43 (H) 0.90 - 1.10    Magnesium   Result Value Ref Range    Magnesium 3.2 (H) 1.8 - 2.6 mg/dL   Calcium, Ionized, Measured   Result Value Ref Range    Calcium, Ionized Measured 1.05 (L) 1.11 - 1.30 mmol/L    Calcium, Ionized pH 7.4 1.03 (L) 1.11 - 1.30 mmol/L    pH 7.36 7.35 - 7.45   HM1 (CBC with Diff)   Result Value Ref Range    WBC 17.3 (H) 4.0 - 11.0 thou/uL    RBC 3.28 (L) 4.40 - 6.20 mill/uL    Hemoglobin 9.5 (L) 14.0 - 18.0 g/dL    Hematocrit 30.2 (L) 40.0 - 54.0 %    MCV 92 80 - 100 fL    MCH 29.0 27.0 - 34.0 pg    MCHC 31.5 (L) 32.0 - 36.0 g/dL    RDW 14.9 (H) 11.0 - 14.5 %    Platelets 191 140 - 440 thou/uL    MPV 10.8 8.5 - 12.5 fL    Neutrophils % 79 (H) 50 - 70 %    Lymphocytes % 8 (L) 20 - 40 %    Monocytes % 12 (H) 2 - 10 %    Eosinophils % 0 0 - 6 %    Basophils % 0 0 - 2 %    Neutrophils Absolute 13.7 (H) 2.0 - 7.7 thou/uL    Lymphocytes Absolute 1.4 0.8 - 4.4 thou/uL    Monocytes Absolute 2.0 (H) 0.0 - 0.9 thou/uL    Eosinophils Absolute 0.0 0.0 - 0.4 thou/uL    Basophils Absolute 0.0 0.0 - 0.2 thou/uL   Blood Gases, Arterial   Result Value Ref Range    pH, Arterial 7.32 (L) 7.37 - 7.44    pCO2, Arterial 37 35 - 45 mm Hg    pO2, Arterial 163 (H) 80 - 90 mm Hg    Bicarbonate, Arterial Calc 19.9 (L) 23.0 - 29.0 mmol/L    O2 Sat, Arterial 100.0 (H) 96.0 - 97.0 %    Oxyhemoglobin 96.8 96.0 - 97.0 %    Base Excess, Arterial Calc -6.4 mmol/L    Ventilation Mode AC     Rate 16 rr/min    FIO2 80.00     Peep 5 cm H2O    Sample Stabilized Temperature 37.0 degrees C    Ventilator Tidal Volume 500 mL   EKG 12 lead   Result Value Ref Range    SYSTOLIC BLOOD PRESSURE      DIASTOLIC BLOOD PRESSURE      VENTRICULAR RATE 97 BPM    ATRIAL RATE 97 BPM    P-R INTERVAL 156 ms    QRS DURATION 134 ms    Q-T INTERVAL 440 ms    QTC CALCULATION (BEZET) 558 ms    P Axis 28 degrees    R AXIS -25 degrees    T AXIS -7 degrees    MUSE DIAGNOSIS       Normal sinus rhythm  Right bundle branch block  Inferior infarct , age undetermined  Anterolateral  infarct , age undetermined  Abnormal ECG  When compared with ECG of 28-MAY-2020 12:19,  Significant changes have occurred  Confirmed by BRET KEBEDE MD LOC: ([id number]) on 6/8/2020 3:42:01 PM     POCT Glucose    Specimen: Artery; Blood   Result Value Ref Range    Glucose 220 (H) 70 - 139 mg/dL   POCT Glucose    Specimen: Artery; Blood   Result Value Ref Range    Glucose 218 (H) 70 - 139 mg/dL   POCT Glucose    Specimen: Artery; Blood   Result Value Ref Range    Glucose 224 (H) 70 - 139 mg/dL   Blood Gases, Arterial   Result Value Ref Range    pH, Arterial 7.24 (LL) 7.37 - 7.44    pCO2, Arterial 47 (H) 35 - 45 mm Hg    pO2, Arterial 74 (L) 80 - 90 mm Hg    Bicarbonate, Arterial Calc 19.4 (L) 23.0 - 29.0 mmol/L    O2 Sat, Arterial 97.1 (H) 96.0 - 97.0 %    Oxyhemoglobin 93.9 (L) 96.0 - 97.0 %    Base Excess, Arterial Calc -7.0 mmol/L    Ventilation Mode CPAP/PS     FIO2 40.00     PSV 5 cm H2O    Peep 5 cm H2O    Sample Stabilized Temperature 37.0 degrees C   POCT Glucose    Specimen: Artery; Blood   Result Value Ref Range    Glucose 199 (H) 70 - 139 mg/dL   Blood Gases, Arterial   Result Value Ref Range    pH, Arterial 7.37 7.37 - 7.44    pCO2, Arterial 36 35 - 45 mm Hg    pO2, Arterial 84 80 - 90 mm Hg    Bicarbonate, Arterial Calc 21.8 (L) 23.0 - 29.0 mmol/L    O2 Sat, Arterial 98.5 (H) 96.0 - 97.0 %    Oxyhemoglobin 95.7 (L) 96.0 - 97.0 %    Base Excess, Arterial Calc -4.0 mmol/L    Ventilation Mode AC     Rate 20 rr/min    FIO2 40.00     Peep 5 cm H2O    Sample Stabilized Temperature 37.0 degrees C    Ventilator Tidal Volume 500 mL   POCT Glucose    Specimen: Artery; Blood   Result Value Ref Range    Glucose 211 (H) 70 - 139 mg/dL   POCT Glucose    Specimen: Artery; Blood   Result Value Ref Range    Glucose 179 (H) 70 - 139 mg/dL   POCT Glucose    Specimen: Artery; Blood   Result Value Ref Range    Glucose 161 (H) 70 - 139 mg/dL   Blood Gases, Arterial post extubation   Result Value Ref Range    pH, Arterial 7.29  (L) 7.37 - 7.44    pCO2, Arterial 55 (H) 35 - 45 mm Hg    pO2, Arterial 71 (L) 80 - 90 mm Hg    Bicarbonate, Arterial Calc 24.5 23.0 - 29.0 mmol/L    O2 Sat, Arterial 96.3 96.0 - 97.0 %    Oxyhemoglobin 93.6 (L) 96.0 - 97.0 %    Base Excess, Arterial Calc -0.5 mmol/L    Ventilation Mode Nasal cannula     Flow 3.0 LPM    Sample Stabilized Temperature 37.0 degrees C   POCT Glucose    Specimen: Artery; Blood   Result Value Ref Range    Glucose 107 70 - 139 mg/dL   POCT Glucose    Specimen: Artery; Blood   Result Value Ref Range    Glucose 96 70 - 139 mg/dL   POCT Glucose    Specimen: Artery; Blood   Result Value Ref Range    Glucose 95 70 - 139 mg/dL   POCT Glucose    Specimen: Artery; Blood   Result Value Ref Range    Glucose 132 70 - 139 mg/dL   POCT Glucose    Specimen: Artery; Blood   Result Value Ref Range    Glucose 121 70 - 139 mg/dL   POCT Glucose    Specimen: Artery; Blood   Result Value Ref Range    Glucose 115 70 - 139 mg/dL   POCT Glucose    Specimen: Artery; Blood   Result Value Ref Range    Glucose 132 70 - 139 mg/dL   Basic Metabolic Panel   Result Value Ref Range    Sodium 137 136 - 145 mmol/L    Potassium 4.5 3.5 - 5.0 mmol/L    Chloride 105 98 - 107 mmol/L    CO2 23 22 - 31 mmol/L    Anion Gap, Calculation 9 5 - 18 mmol/L    Glucose 122 70 - 125 mg/dL    Calcium 7.8 (L) 8.5 - 10.5 mg/dL    BUN 13 8 - 22 mg/dL    Creatinine 0.90 0.70 - 1.30 mg/dL    GFR MDRD Af Amer >60 >60 mL/min/1.73m2    GFR MDRD Non Af Amer >60 >60 mL/min/1.73m2   INR   Result Value Ref Range    INR 1.36 (H) 0.90 - 1.10   Magnesium   Result Value Ref Range    Magnesium 2.5 1.8 - 2.6 mg/dL   Calcium, Ionized, Measured   Result Value Ref Range    Calcium, Ionized Measured 1.09 (L) 1.11 - 1.30 mmol/L    Calcium, Ionized pH 7.4 1.06 (L) 1.11 - 1.30 mmol/L    pH 7.33 (L) 7.35 - 7.45   HM1 (CBC with Diff)   Result Value Ref Range    WBC 13.5 (H) 4.0 - 11.0 thou/uL    RBC 2.68 (L) 4.40 - 6.20 mill/uL    Hemoglobin 7.8 (L) 14.0 - 18.0 g/dL     Hematocrit 25.6 (L) 40.0 - 54.0 %    MCV 96 80 - 100 fL    MCH 29.1 27.0 - 34.0 pg    MCHC 30.5 (L) 32.0 - 36.0 g/dL    RDW 15.4 (H) 11.0 - 14.5 %    Platelets 182 140 - 440 thou/uL    MPV 11.2 8.5 - 12.5 fL    Neutrophils % 76 (H) 50 - 70 %    Lymphocytes % 9 (L) 20 - 40 %    Monocytes % 14 (H) 2 - 10 %    Eosinophils % 0 0 - 6 %    Basophils % 0 0 - 2 %    Neutrophils Absolute 10.3 (H) 2.0 - 7.7 thou/uL    Lymphocytes Absolute 1.2 0.8 - 4.4 thou/uL    Monocytes Absolute 1.9 (H) 0.0 - 0.9 thou/uL    Eosinophils Absolute 0.0 0.0 - 0.4 thou/uL    Basophils Absolute 0.0 0.0 - 0.2 thou/uL   POCT Glucose    Specimen: Blood   Result Value Ref Range    Glucose 117 70 - 139 mg/dL   POCT Glucose    Specimen: Artery; Blood   Result Value Ref Range    Glucose 106 70 - 139 mg/dL   POCT Glucose    Specimen: Artery; Blood   Result Value Ref Range    Glucose 124 70 - 139 mg/dL   Blood Gases, Arterial   Result Value Ref Range    pH, Arterial 7.33 (L) 7.37 - 7.44    pCO2, Arterial 44 35 - 45 mm Hg    pO2, Arterial 66 (L) 80 - 90 mm Hg    Bicarbonate, Arterial Calc 22.9 (L) 23.0 - 29.0 mmol/L    O2 Sat, Arterial 96.2 96.0 - 97.0 %    Oxyhemoglobin 93.1 (L) 96.0 - 97.0 %    Base Excess, Arterial Calc -2.6 mmol/L    Ventilation Mode Nasal cannula     Flow 2.0 LPM    Sample Stabilized Temperature 37.0 degrees C   POCT Glucose    Specimen: Artery; Blood   Result Value Ref Range    Glucose 139 70 - 139 mg/dL   POCT Glucose    Specimen: Artery; Blood   Result Value Ref Range    Glucose 89 70 - 139 mg/dL   POCT Glucose    Specimen: Artery; Blood   Result Value Ref Range    Glucose 117 70 - 139 mg/dL   POCT Glucose    Specimen: Blood   Result Value Ref Range    Glucose 117 70 - 139 mg/dL   POCT Glucose    Specimen: Blood   Result Value Ref Range    Glucose 106 70 - 139 mg/dL   Hemoglobin   Result Value Ref Range    Hemoglobin 6.6 (LL) 14.0 - 18.0 g/dL   Platelet Count - DO NOT remove unless platelet count already ordered by other source    Result Value Ref Range    Platelets 134 (L) 140 - 440 thou/uL   Basic Metabolic Panel   Result Value Ref Range    Sodium 133 (L) 136 - 145 mmol/L    Potassium 4.3 3.5 - 5.0 mmol/L    Chloride 99 98 - 107 mmol/L    CO2 26 22 - 31 mmol/L    Anion Gap, Calculation 8 5 - 18 mmol/L    Glucose 92 70 - 125 mg/dL    Calcium 8.1 (L) 8.5 - 10.5 mg/dL    BUN 19 8 - 22 mg/dL    Creatinine 0.85 0.70 - 1.30 mg/dL    GFR MDRD Af Amer >60 >60 mL/min/1.73m2    GFR MDRD Non Af Amer >60 >60 mL/min/1.73m2   Magnesium   Result Value Ref Range    Magnesium 2.3 1.8 - 2.6 mg/dL   Platelet Product Information   Result Value Ref Range    Status Canceled     Component Platelets    POCT Glucose    Specimen: Blood   Result Value Ref Range    Glucose 96 70 - 139 mg/dL   POCT Glucose    Specimen: Blood   Result Value Ref Range    Glucose 105 70 - 139 mg/dL   POCT Glucose    Specimen: Blood   Result Value Ref Range    Glucose 95 70 - 139 mg/dL   POCT Glucose    Specimen: Blood   Result Value Ref Range    Glucose 87 70 - 139 mg/dL   Crossmatch   Result Value Ref Range    Crossmatch Compatible     Unit Type O Pos     Unit Number [id number]     Status Released     Component Red Blood Cells     PRODUCT CODE O9482F19     Blood Type 5100     CODING SYSTEM [id number]    Crossmatch   Result Value Ref Range    Crossmatch Compatible     Unit Type O Pos     Unit Number [id number]     Status Transfused     Component Red Blood Cells     PRODUCT CODE T4848X02     Issue Date and Time [id number]     Blood Type 5100     CODING SYSTEM [id number]    Crossmatch   Result Value Ref Range    Crossmatch Compatible     Unit Type O Pos     Unit Number [id number]     Status Transfused     Component Red Blood Cells     PRODUCT CODE [id number]     Issue Date and Time [id number]     Blood Type 5100     CODING SYSTEM [id number]    Basic Metabolic Panel   Result Value Ref Range    Sodium 134 (L) 136 - 145 mmol/L    Potassium 3.0 (L) 3.5 - 5.0 mmol/L    Chloride 96 (L) 98  - 107 mmol/L    CO2 27 22 - 31 mmol/L    Anion Gap, Calculation 11 5 - 18 mmol/L    Glucose 71 70 - 125 mg/dL    Calcium 8.4 (L) 8.5 - 10.5 mg/dL    BUN 20 8 - 22 mg/dL    Creatinine 0.83 0.70 - 1.30 mg/dL    GFR MDRD Af Amer >60 >60 mL/min/1.73m2    GFR MDRD Non Af Amer >60 >60 mL/min/1.73m2   Hemoglobin   Result Value Ref Range    Hemoglobin 10.0 (L) 14.0 - 18.0 g/dL   Magnesium   Result Value Ref Range    Magnesium 1.9 1.8 - 2.6 mg/dL   POCT Glucose    Specimen: Blood   Result Value Ref Range    Glucose 76 70 - 139 mg/dL   POCT Glucose    Specimen: Blood   Result Value Ref Range    Glucose 97 70 - 139 mg/dL   Magnesium   Result Value Ref Range    Magnesium 2.4 1.8 - 2.6 mg/dL   Potassium   Result Value Ref Range    Potassium 3.8 3.5 - 5.0 mmol/L   POCT Glucose    Specimen: Blood   Result Value Ref Range    Glucose 89 70 - 139 mg/dL   POCT Glucose    Specimen: Blood   Result Value Ref Range    Glucose 96 70 - 139 mg/dL   Hemoglobin   Result Value Ref Range    Hemoglobin 9.8 (L) 14.0 - 18.0 g/dL   Platelet Count - DO NOT remove unless platelet count already ordered by other source   Result Value Ref Range    Platelets 200 140 - 440 thou/uL   Basic Metabolic Panel   Result Value Ref Range    Sodium 136 136 - 145 mmol/L    Potassium 3.7 3.5 - 5.0 mmol/L    Chloride 96 (L) 98 - 107 mmol/L    CO2 32 (H) 22 - 31 mmol/L    Anion Gap, Calculation 8 5 - 18 mmol/L    Glucose 94 70 - 125 mg/dL    Calcium 8.6 8.5 - 10.5 mg/dL    BUN 18 8 - 22 mg/dL    Creatinine 0.88 0.70 - 1.30 mg/dL    GFR MDRD Af Amer >60 >60 mL/min/1.73m2    GFR MDRD Non Af Amer >60 >60 mL/min/1.73m2   Magnesium   Result Value Ref Range    Magnesium 2.2 1.8 - 2.6 mg/dL   POCT Glucose    Specimen: Blood   Result Value Ref Range    Glucose 92 70 - 139 mg/dL   POCT Glucose    Specimen: Blood   Result Value Ref Range    Glucose 136 70 - 139 mg/dL   POCT Glucose    Specimen: Blood   Result Value Ref Range    Glucose 132 70 - 139 mg/dL   POCT Glucose     Specimen: Blood   Result Value Ref Range    Glucose 124 70 - 139 mg/dL   Basic Metabolic Panel   Result Value Ref Range    Sodium 137 136 - 145 mmol/L    Potassium 3.2 (L) 3.5 - 5.0 mmol/L    Chloride 96 (L) 98 - 107 mmol/L    CO2 31 22 - 31 mmol/L    Anion Gap, Calculation 10 5 - 18 mmol/L    Glucose 83 70 - 125 mg/dL    Calcium 8.8 8.5 - 10.5 mg/dL    BUN 17 8 - 22 mg/dL    Creatinine 0.76 0.70 - 1.30 mg/dL    GFR MDRD Af Amer >60 >60 mL/min/1.73m2    GFR MDRD Non Af Amer >60 >60 mL/min/1.73m2   Magnesium   Result Value Ref Range    Magnesium 1.9 1.8 - 2.6 mg/dL   POCT Glucose    Specimen: Blood   Result Value Ref Range    Glucose 86 70 - 139 mg/dL   POCT Glucose    Specimen: Blood   Result Value Ref Range    Glucose 144 (H) 70 - 139 mg/dL   HM2(CBC w/o Differential)   Result Value Ref Range    WBC 10.9 4.0 - 11.0 thou/uL    RBC 4.45 4.40 - 6.20 mill/uL    Hemoglobin 12.7 (L) 14.0 - 18.0 g/dL    Hematocrit 40.4 40.0 - 54.0 %    MCV 91 80 - 100 fL    MCH 28.5 27.0 - 34.0 pg    MCHC 31.4 (L) 32.0 - 36.0 g/dL    RDW 15.6 (H) 11.0 - 14.5 %    Platelets 524 (H) 140 - 440 thou/uL    MPV 10.2 8.5 - 12.5 fL   Basic Metabolic Panel   Result Value Ref Range    Sodium 134 (L) 136 - 145 mmol/L    Potassium 4.1 3.5 - 5.0 mmol/L    Chloride 97 (L) 98 - 107 mmol/L    CO2 28 22 - 31 mmol/L    Anion Gap, Calculation 9 5 - 18 mmol/L    Glucose 106 70 - 125 mg/dL    Calcium 9.6 8.5 - 10.5 mg/dL    BUN 24 (H) 8 - 22 mg/dL    Creatinine 0.96 0.70 - 1.30 mg/dL    GFR MDRD Af Amer >60 >60 mL/min/1.73m2    GFR MDRD Non Af Amer >60 >60 mL/min/1.73m2     Current Outpatient Medications   Medication Sig   ? acetaminophen (TYLENOL) 325 MG tablet Take 2 tablets (650 mg total) by mouth every 4 (four) hours as needed for pain or fever.   ? aspirin 81 MG EC tablet Take 81 mg by mouth daily.   ? calcium citrate-vitamin D (CITRACAL+D) 315-200 mg-unit per tablet Take 1 tablet by mouth daily.   ? clopidogrel (PLAVIX) 75 mg tablet Take 75 mg by mouth  daily.   ? docusate sodium (COLACE) 100 MG capsule Take 1 capsule (100 mg total) by mouth daily.   ? entecavir (BARACLUDE) 0.5 MG tablet Take 0.5 mg by mouth daily.   ? FLUoxetine (PROZAC) 10 MG tablet Take 10 mg by mouth daily. Take with 1 tablet with the 40mg to make it 50mg daily.   ? FLUoxetine (PROZAC) 40 MG capsule Take 40 mg by mouth daily.   ? furosemide (LASIX) 40 MG tablet Take 1 tablet (40 mg total) by mouth 2 (two) times a day at 9am and 6pm for 5 days.   ? latanoprost (XALATAN) 0.005 % ophthalmic solution Administer 1 drop to both eyes at bedtime.   ? magnesium oxide (MAG-OX) 400 mg (241.3 mg magnesium) tablet Take 1 tablet (400 mg total) by mouth daily for 5 days.   ? metoprolol tartrate (LOPRESSOR) 25 MG tablet Take 0.5 tablets (12.5 mg total) by mouth 2 (two) times a day.   ? mycophenolate (CELLCEPT) 250 mg capsule Take 750 mg by mouth 2 (two) times a day.   ? nitroglycerin (NITROSTAT) 0.4 MG SL tablet Place 1 tablet (0.4 mg total) under the tongue every 5 (five) minutes as needed for chest pain.   ? OMEGA-3 FATTY ACIDS ORAL Take 1 capsule by mouth daily.   ? omeprazole (PRILOSEC OTC) 20 MG tablet Take 20 mg by mouth daily before breakfast.   ? ONE DAILY MULTIVITAMIN ORAL Take 1 tablet by mouth daily.   ? polyethylene glycol (MIRALAX) 17 gram packet Take 1 packet (17 g total) by mouth daily as needed (Constipation).   ? predniSONE (DELTASONE) 5 MG tablet Take 5 mg by mouth daily.   ? rosuvastatin (CRESTOR) 20 MG tablet Take 1 tablet (20 mg total) by mouth at bedtime.   ? traMADoL (ULTRAM) 50 mg tablet Take 1 tablet (50 mg total) by mouth every 6 (six) hours as needed for pain.     ASSESSMENT:      ICD-10-CM    1. NSTEMI (non-ST elevated myocardial infarction) (H)  I21.4    2. End stage renal disease (H)  N18.6    3. Chronic viral hepatitis B without delta agent and without coma (H)  B18.1    4. Essential hypertension  I10    5. Pain management  R52        PLAN:    Crackles eft lower lobe/fatigue,   dry cough.  chest x-ray,  labs and COVID test currently with low blood pressure 104/72 parameters placed on ACE ordered    Non-STEMI status post triple bypass PT OT    End-stage renal disease patient with history of bilateral kidney transplant, on CellCept    Chronic viral hepatitis B on entecavir     Hypertension on metoprolol tartrate 12.5 mg twice daily  BP currently soft at 104/72.       physical debility PT OT    Pain management PRN regular strength Tylenol and tramadol as needed        Electronically signed by: Joaquina Donald CNP

## 2021-06-20 NOTE — LETTER
Letter by Joaquina Donald CNP at      Author: Joaquina Donald CNP Service: -- Author Type: --    Filed:  Encounter Date: 6/23/2020 Status: (Other)         Patient: Jerad Ross   MR Number: [id number]   YOB: 1960   Date of Visit: 6/23/2020     Pain management [facility]    Facility:   [facility] [[id number]]   Code Status: FULL CODE      CHIEF COMPLAINT/REASON FOR VISIT:  Chief Complaint   Patient presents with   ? Review Of Multiple Medical Conditions     F/U triple bypass/pain management       HISTORY:      HPI: Jerad is a 60 y.o. male undergoing physical and occupational therapy at [facility]. He is with past medical history of  end-stage kidney disease status post bilateral kidney transplant, and known coronary artery disease. He was hospitalized 6/2-6/13 following a MI.  Patient underwent coronary angiogram and following the angiographic finding, patient was referred to CV surgery for evaluation for possible coronary revascularization.   Patient was admitted in the hospital for Plavix washout and on June 8, 2020, following inpatient optimization, patient was taken to the operative room where he underwent coronary artery bypass graft x3, with a full individual grafts; left internal mammary artery to the left anterior descending coronary artery, reverse saphenous vein graft to the lateral branch of right coronary artery, reverse saphenous vein graft to the posterior descending branch of right coronary artery, greater saphenous vein procurement from the left lower extremity using endoscopic vein harvest technique, sternal closure ZACH topete.     Today he is seen for follow-up to bypass surgery and review of pain.  Today he tells me he started feeling better as of last Friday. BP soft at 96/68 but he denied dizziness.  His hemoglobin has come up to 12.7 WBC 10.9 and his potassium has increased from 3.2-4.1.  He denied chest pain or shortness of breath.   He reports he is urinating well.   He has no lower extremity edema.. He denied constipation or diarrhea he continues on sternal precautions.He tells me his pain is controlled with just tylenol. He feels he is making progress in therapy.     Past Medical History:   Diagnosis Date   ? Acute midline low back pain without sciatica    ? AION (acute ischemic optic neuropathy)    ? Anemia in chronic renal disease    ? Avascular necrosis of bones of both hips (H)     s/p bilateral hip replacements   ? Basal cell carcinoma    ? Chronic hepatitis B (H)    ? Clostridium difficile colitis    ? Coronary artery disease involving native coronary artery of native heart without angina pectoris 06/17/2014    Coronary angiogram 6/17/14: Severe distal 3-vessel disease involving small vessels, not amenable to PCI or CABG   ? Depression    ? Diverticulosis    ? Dyslipidemia    ? Elevated C-reactive protein (CRP)    ? FSGS (focal segmental glomerulosclerosis)    ? Gastric ulcer with hemorrhage 02/12/2012   ? GERD (gastroesophageal reflux disease)    ? Glaucoma    ? Hemorrhoids    ? HTN (hypertension)    ? Hyperlipidemia    ? Hypogonadism in male    ? Kidney transplanted     focal glomerulosclerosis    ? NSTEMI (non-ST elevated myocardial infarction) (H) 06/17/2014   ? JULIANE (obstructive sleep apnea)    ? Paracentral scotoma     LE   ? Secondary renal hyperparathyroidism (H)    ? Septic bursitis    ? Squamous cell carcinoma              Family History   Problem Relation Age of Onset   ? Other Father         AI with valve repair   ? Hypertension Father    ? Kidney disease Father    ? Other Maternal Grandfather         cerebrovascular disease   ? Ovarian cancer Paternal Grandmother    ? Kidney disease Paternal Aunt      Social History     Socioeconomic History   ? Marital status:      Spouse name: Not on file   ? Number of children: Not on file   ? Years of education: Not on file   ? Highest education level: Not on file   Occupational  History   ? Not on file   Social Needs   ? Financial resource strain: Not on file   ? Food insecurity     Worry: Not on file     Inability: Not on file   ? Transportation needs     Medical: Not on file     Non-medical: Not on file   Tobacco Use   ? Smoking status: Former Smoker     Packs/day: 1.00     Years: 9.00     Pack years: 9.00     Last attempt to quit: 1988     Years since quittin.4   ? Smokeless tobacco: Former User   Substance and Sexual Activity   ? Alcohol use: Yes     Frequency: Monthly or less   ? Drug use: Never   ? Sexual activity: Not on file   Lifestyle   ? Physical activity     Days per week: Not on file     Minutes per session: Not on file   ? Stress: Not on file   Relationships   ? Social connections     Talks on phone: Not on file     Gets together: Not on file     Attends Worship service: Not on file     Active member of club or organization: Not on file     Attends meetings of clubs or organizations: Not on file     Relationship status: Not on file   ? Intimate partner violence     Fear of current or ex partner: Not on file     Emotionally abused: Not on file     Physically abused: Not on file     Forced sexual activity: Not on file   Other Topics Concern   ? Not on file   Social History Narrative   ? Not on file         Review of Systems   Constitutional: Positive for fatigue. Negative for activity change, appetite change, chills and fever.   HENT: Negative for congestion and sore throat.    Eyes: Negative for visual disturbance.   Respiratory: Negative for cough, shortness of breath and wheezing.    Cardiovascular: Negative for chest pain and leg swelling.   Gastrointestinal: Negative for abdominal distention, abdominal pain, constipation, diarrhea and nausea.   Genitourinary: Negative for dysuria.   Musculoskeletal: Negative for arthralgias and myalgias.   Skin: Positive for wound. Negative for color change and rash.   Neurological: Negative for weakness and numbness.    Psychiatric/Behavioral: Negative for agitation, behavioral problems and sleep disturbance.       Vitals:    06/23/20 2046   BP: 96/68   Resp: 16   Temp: 98  F (36.7  C)   SpO2: 96%   Weight: 162 lb 9.6 oz (73.8 kg)       Physical Exam  Constitutional:       Appearance: He is well-developed.   HENT:      Head: Normocephalic.   Eyes:      Conjunctiva/sclera: Conjunctivae normal.   Neck:      Musculoskeletal: Normal range of motion.   Cardiovascular:      Rate and Rhythm: Normal rate and regular rhythm.      Heart sounds: Normal heart sounds. No murmur.   Pulmonary:      Effort: No respiratory distress.      Breath sounds: No wheezing.   Abdominal:      General: Bowel sounds are normal. There is no distension.      Palpations: Abdomen is soft.      Tenderness: There is no abdominal tenderness.   Musculoskeletal: Normal range of motion.   Skin:     General: Skin is warm.      Comments: Midline chest incision with CT sites/C/D/I    dermabond incision with bruising left medial leg.    Neurological:      Mental Status: He is alert and oriented to person, place, and time.   Psychiatric:         Behavior: Behavior normal.           LABS:   Recent Results (from the past 240 hour(s))   HM2(CBC w/o Differential)   Result Value Ref Range    WBC 10.9 4.0 - 11.0 thou/uL    RBC 4.45 4.40 - 6.20 mill/uL    Hemoglobin 12.7 (L) 14.0 - 18.0 g/dL    Hematocrit 40.4 40.0 - 54.0 %    MCV 91 80 - 100 fL    MCH 28.5 27.0 - 34.0 pg    MCHC 31.4 (L) 32.0 - 36.0 g/dL    RDW 15.6 (H) 11.0 - 14.5 %    Platelets 524 (H) 140 - 440 thou/uL    MPV 10.2 8.5 - 12.5 fL   Basic Metabolic Panel   Result Value Ref Range    Sodium 134 (L) 136 - 145 mmol/L    Potassium 4.1 3.5 - 5.0 mmol/L    Chloride 97 (L) 98 - 107 mmol/L    CO2 28 22 - 31 mmol/L    Anion Gap, Calculation 9 5 - 18 mmol/L    Glucose 106 70 - 125 mg/dL    Calcium 9.6 8.5 - 10.5 mg/dL    BUN 24 (H) 8 - 22 mg/dL    Creatinine 0.96 0.70 - 1.30 mg/dL    GFR MDRD Af Amer >60 >60 mL/min/1.73m2     GFR MDRD Non Af Amer >60 >60 mL/min/1.73m2     Current Outpatient Medications   Medication Sig   ? acetaminophen (TYLENOL) 325 MG tablet Take 2 tablets (650 mg total) by mouth every 4 (four) hours as needed for pain or fever.   ? aspirin 81 MG EC tablet Take 81 mg by mouth daily.   ? calcium citrate-vitamin D (CITRACAL+D) 315-200 mg-unit per tablet Take 1 tablet by mouth daily.   ? clopidogrel (PLAVIX) 75 mg tablet Take 75 mg by mouth daily.   ? docusate sodium (COLACE) 100 MG capsule Take 1 capsule (100 mg total) by mouth daily.   ? entecavir (BARACLUDE) 0.5 MG tablet Take 0.5 mg by mouth daily.   ? FLUoxetine (PROZAC) 10 MG tablet Take 10 mg by mouth daily. Take with 1 tablet with the 40mg to make it 50mg daily.   ? FLUoxetine (PROZAC) 40 MG capsule Take 40 mg by mouth daily.   ? furosemide (LASIX) 40 MG tablet Take 1 tablet (40 mg total) by mouth 2 (two) times a day at 9am and 6pm for 5 days.   ? latanoprost (XALATAN) 0.005 % ophthalmic solution Administer 1 drop to both eyes at bedtime.   ? metoprolol tartrate (LOPRESSOR) 25 MG tablet Take 0.5 tablets (12.5 mg total) by mouth 2 (two) times a day.   ? mycophenolate (CELLCEPT) 250 mg capsule Take 750 mg by mouth 2 (two) times a day.   ? nitroglycerin (NITROSTAT) 0.4 MG SL tablet Place 1 tablet (0.4 mg total) under the tongue every 5 (five) minutes as needed for chest pain.   ? OMEGA-3 FATTY ACIDS ORAL Take 1 capsule by mouth daily.   ? omeprazole (PRILOSEC OTC) 20 MG tablet Take 20 mg by mouth daily before breakfast.   ? ONE DAILY MULTIVITAMIN ORAL Take 1 tablet by mouth daily.   ? polyethylene glycol (MIRALAX) 17 gram packet Take 1 packet (17 g total) by mouth daily as needed (Constipation).   ? predniSONE (DELTASONE) 5 MG tablet Take 5 mg by mouth daily.   ? rosuvastatin (CRESTOR) 20 MG tablet Take 1 tablet (20 mg total) by mouth at bedtime.   ? traMADoL (ULTRAM) 50 mg tablet Take 1 tablet (50 mg total) by mouth every 6 (six) hours as needed for pain.      ASSESSMENT:      ICD-10-CM    1. NSTEMI (non-ST elevated myocardial infarction) (H)  I21.4    2. Essential hypertension  I10    3. Pain management  R52        PLAN:   Fatigue Hemoglobin improved to 12.7    Non-STEMI status post triple bypass PT OT    End-stage renal disease patient with history of bilateral kidney transplant, on CellCept    Chronic viral hepatitis B on entecavir     Hypertension on metoprolol tartrate 12.5 mg twice daily  BP currently soft at 96/68.       physical debility PT OT    Pain management PRN regular strength Tylenol and tramadol as needed    Hypokalemia Patient recently replaced and potassium now 4.1    Electronically signed by: Joaquina Donald CNP

## 2021-06-20 NOTE — LETTER
Letter by Joaquina Donald CNP at      Author: Joaquina Donald CNP Service: -- Author Type: --    Filed:  Encounter Date: 6/30/2020 Status: (Other)         Patient: Jerad Ross   MR Number: [id number]   YOB: 1960   Date of Visit: 6/30/2020     [facility]    Facility:   [facility] [[id number]]   Code Status: FULL CODE  PCP: Aston Perry MD   Phone: [phone]   Fax: [phone]      CHIEF COMPLAINT/REASON FOR VISIT:  Chief Complaint   Patient presents with   ? Discharge Summary       HISTORY COURSE:  Jerad is a 60 y.o. male undergoing physical and occupational therapy at [facility]. He is with past medical history of  end-stage kidney disease status post bilateral kidney transplant, and known coronary artery disease. He was hospitalized 6/2-6/13 following a MI.  Patient underwent coronary angiogram and following the angiographic finding, patient was referred to CV surgery for evaluation for possible coronary revascularization.   Patient was admitted in the hospital for Plavix washout and on June 8, 2020, following inpatient optimization, patient was taken to the operative room where he underwent coronary artery bypass graft x3, with a full individual grafts; left internal mammary artery to the left anterior descending coronary artery, reverse saphenous vein graft to the lateral branch of right coronary artery, reverse saphenous vein graft to the posterior descending branch of right coronary artery, greater saphenous vein procurement from the left lower extremity using endoscopic vein harvest technique, sternal closure ZACH topete.      Today he is seen for a face to face for discharge. He will discharge to home on 7/3/20 with current medications and treatments. He will have home care services PT/OT/HHA and RN. He will also have outpatient cardiac therapy.  He continues to report intermittent dizziness but did not have it during my visit. He feels it  may be related to his lower BP's since surgery. Currently he is on Metoprolol tartrate 12.5 mg two times a day.  Last /69 with a pulse of 71. He will have a virtual visit today with the cardiac PA and will follow up in the clinic on 7/21/20.    His hemoglobin has come up to 12.7 WBC 10.9 and his potassium has increased from 3.2-4.1.  He denied chest pain or shortness of breath.  He reports he is urinating well.   He has no lower extremity edema.. He denied constipation or diarrhea he continues on sternal precautions.He tells me his pain is controlled with just tylenol. He feels he is making progress in therapy. His weights were reviewed and he is down 6 pounds in the last 15 days.     Review of Systems  Constitutional: Positive for fatigue. Negative for activity change, appetite change, chills and fever.   HENT: Negative for congestion and sore throat.    Eyes: Negative for visual disturbance.   Respiratory: Negative for cough, shortness of breath and wheezing.    Cardiovascular: Negative for chest pain and leg swelling.   Gastrointestinal: Negative for abdominal distention, abdominal pain, constipation, diarrhea and nausea.   Genitourinary: Negative for dysuria.   Musculoskeletal: Negative for arthralgias and myalgias.   Skin: Positive for wound. Negative for color change and rash.   Neurological: Negative for weakness and numbness.   Psychiatric/Behavioral: Negative for agitation, behavioral problems and sleep disturbance.   Vitals:    06/29/20 1809   BP: 108/69   Pulse: 71   Resp: 17   Temp: 98.5  F (36.9  C)   SpO2: 97%   Weight: 162 lb 4.8 oz (73.6 kg)       Physical Exam  Constitutional:       Appearance: He is well-developed.   HENT:      Head: Normocephalic.   Eyes:      Conjunctiva/sclera: Conjunctivae normal.   Neck:      Musculoskeletal: Normal range of motion.   Cardiovascular:      Rate and Rhythm: Normal rate and regular rhythm.      Heart sounds: Normal heart sounds. No murmur.   Pulmonary:       Effort: No respiratory distress.      Breath sounds: No wheezing.   Abdominal:      General: Bowel sounds are normal. There is no distension.      Palpations: Abdomen is soft.      Tenderness: There is no abdominal tenderness.   Musculoskeletal: Normal range of motion.   Skin:     General: Skin is warm.      Comments: Midline chest incision with CT sites/C/D/I    dermabond incision with bruising left medial leg.    Neurological:      Mental Status: He is alert and oriented to person, place, and time.   Psychiatric:         Behavior: Behavior normal.      MEDICATION LIST:  Current Outpatient Medications   Medication Sig   ? acetaminophen (TYLENOL) 325 MG tablet Take 2 tablets (650 mg total) by mouth every 4 (four) hours as needed for pain or fever.   ? aspirin 81 MG EC tablet Take 81 mg by mouth daily.   ? calcium citrate-vitamin D (CITRACAL+D) 315-200 mg-unit per tablet Take 1 tablet by mouth daily.   ? clopidogrel (PLAVIX) 75 mg tablet Take 75 mg by mouth daily.   ? docusate sodium (COLACE) 100 MG capsule Take 1 capsule (100 mg total) by mouth daily.   ? entecavir (BARACLUDE) 0.5 MG tablet Take 0.5 mg by mouth daily.   ? FLUoxetine (PROZAC) 10 MG tablet Take 10 mg by mouth daily. Take with 1 tablet with the 40mg to make it 50mg daily.   ? FLUoxetine (PROZAC) 40 MG capsule Take 40 mg by mouth daily.   ? latanoprost (XALATAN) 0.005 % ophthalmic solution Administer 1 drop to both eyes at bedtime.   ? metoprolol tartrate (LOPRESSOR) 25 MG tablet Take 0.5 tablets (12.5 mg total) by mouth 2 (two) times a day.   ? mycophenolate (CELLCEPT) 250 mg capsule Take 750 mg by mouth 2 (two) times a day.   ? nitroglycerin (NITROSTAT) 0.4 MG SL tablet Place 1 tablet (0.4 mg total) under the tongue every 5 (five) minutes as needed for chest pain.   ? OMEGA-3 FATTY ACIDS ORAL Take 1 capsule by mouth daily.   ? omeprazole (PRILOSEC OTC) 20 MG tablet Take 20 mg by mouth daily before breakfast.   ? ONE DAILY MULTIVITAMIN ORAL Take 1  tablet by mouth daily.   ? polyethylene glycol (MIRALAX) 17 gram packet Take 1 packet (17 g total) by mouth daily as needed (Constipation).   ? predniSONE (DELTASONE) 5 MG tablet Take 5 mg by mouth daily.   ? rosuvastatin (CRESTOR) 20 MG tablet Take 1 tablet (20 mg total) by mouth at bedtime.   ? traMADoL (ULTRAM) 50 mg tablet Take 1 tablet (50 mg total) by mouth every 6 (six) hours as needed for pain.   ? furosemide (LASIX) 40 MG tablet Take 1 tablet (40 mg total) by mouth 2 (two) times a day at 9am and 6pm for 5 days.       DISCHARGE DIAGNOSIS:    ICD-10-CM    1. Essential hypertension  I10    2. NSTEMI (non-ST elevated myocardial infarction) (H)  I21.4    3. S/P kidney transplant  Z94.0        MEDICAL EQUIPMENT NEEDS:  None     DISCHARGE PLAN/FACE TO FACE:  I certify that services are/were furnished while this patient was under the care of a physician and that a physician or an allowed non-physician practitioner (NPP), had a face-to-face encounter that meets the physician face-to-face encounter requirements. The encounter was in whole, or in part, related to the primary reason for home health. The patient is confined to his/her home and needs intermittent skilled nursing, physical therapy, speech-language pathology, or the continued need for occupational therapy. A plan of care has been established by a physician and is periodically reviewed by a physician.  Date of Face-to-Face Encounter: 6/30/20    I certify that, based on my findings, the following services are medically necessary home health services: PT/OT/HHA/RN/cardiac outpatient     My clinical findings support the need for the above skilled services because:PT/OT for  continued strength and endurance, HHA to assist  with ADl's, RN for medication management, VS and monitoring of chest wounds and left leg wound.     This patient is homebound because:He is deconditioned and easily fatigued following triple bypass surgery.       The patient is, or has been,  under my care and I have initiated the establishment of the plan of care. This patient will be followed by a physician who will periodically review the plan of care.    Schedule follow up visit with primary care provider within 7 days to reestablish care.    Electronically signed by: Joaquina Donald CNP

## 2021-06-20 NOTE — LETTER
Letter by Geronimo Strickland MD at      Author: Geronimo Strickland MD Service: -- Author Type: --    Filed:  Encounter Date: 6/19/2020 Status: (Other)         Patient: Jerad Ross   MR Number: [id number]   YOB: 1960   Date of Visit: 6/19/2020      [facility]    Facility:  [facility] [[id number]]    Code Status:  FULL CODE    Chief Complaint   Patient presents with   ? H & P   :                    Patient Active Problem List   Diagnosis   ? Chest pain   ? Chronic viral hepatitis B without delta agent and without coma (H)   ? Coronary artery disease involving native coronary artery of native heart without angina pectoris   ? S/P kidney transplant   ? NSTEMI (non-ST elevated myocardial infarction) (H)   ? HTN (hypertension)   ? Abnormal cardiovascular stress test   ? Unstable angina (H)   ? CAD (coronary artery disease)   ? End stage renal disease with bilateral renal transplant       History:  Jerad Ross  is a 60 year old male with history of end-stage renal disease, status post bilateral renal transplants (glomerular sclerosis), secondary hyperparathyroidism, chronic immunosuppression and steroid dependence, chronic hepatitis B, GERD, gastric ulcer/GI bleed, glaucoma, coronary artery disease including non-STEMI in 2014 and recent CABG, dyslipidemia, colectomy due to diverticulosis/diverticulitis, bilateral total hip arthroplasties, bilateral hip arthroplasty revision, splenectomy, appendectomy, tonsillectomy, cataracts, history of C. difficile infection seen for admission to TCU on 6/19/2020    Hospital Course: Patient was admitted on June 2 has discharged on June 13.  He presented with unstable angina/acute MI.  Angiogram revealed severe multivessel artery disease with recommendation for bypass procedure performed by Dr. Arora on June 8 with Dr. Lundberg following from cardiology.  Patient recuperated in the ICU was extubated on arrival required  phenylephrine and Lasix drips but did quite well with removal of chest tubes by postop day #3.    TCU was recommended for strengthening/rehabilitation    Subjective/ROS:    -augmented by discussion with facility staff involved in direct care      Patient feels like he is doing relatively well.  He has chest pain with coughing and with certain movements but none at rest.  He satisfied with acetaminophen and tramadol combination.  He reports that his incisions have been healing quite well without irritation or drainage.    He has refused his Lasix as he is felt lightheaded blood pressures have been low but there have been no syncopal episodes.    Patient reports that his bowels are starting to work again.  His appetite is coming back.  He said no bladder issues.  There have been no falls injuries fever sweats chills cough shortness of breath nausea vomiting melena bright red blood per rectum headaches change in vision speaking swallowing or hearing.  Remainder 13 system ROS negative    Past Medical History:   Diagnosis Date   ? Acute midline low back pain without sciatica    ? AION (acute ischemic optic neuropathy)    ? Anemia in chronic renal disease    ? Avascular necrosis of bones of both hips (H)     s/p bilateral hip replacements   ? Basal cell carcinoma    ? Chronic hepatitis B (H)    ? Clostridium difficile colitis    ? Coronary artery disease involving native coronary artery of native heart without angina pectoris 06/17/2014    Coronary angiogram 6/17/14: Severe distal 3-vessel disease involving small vessels, not amenable to PCI or CABG   ? Depression    ? Diverticulosis    ? Dyslipidemia    ? Elevated C-reactive protein (CRP)    ? FSGS (focal segmental glomerulosclerosis)    ? Gastric ulcer with hemorrhage 02/12/2012   ? GERD (gastroesophageal reflux disease)    ? Glaucoma    ? Hemorrhoids    ? HTN (hypertension)    ? Hyperlipidemia    ? Hypogonadism in male    ? Kidney transplanted     focal  glomerulosclerosis    ? NSTEMI (non-ST elevated myocardial infarction) (H) 06/17/2014   ? JULIANE (obstructive sleep apnea)    ? Paracentral scotoma     LE   ? Secondary renal hyperparathyroidism (H)    ? Septic bursitis    ? Squamous cell carcinoma      Past Surgical History:   Procedure Laterality Date   ? APPENDECTOMY     ? CATARACT EXTRACTION EXTRACAPSULAR W/ INTRAOCULAR LENS IMPLANTATION Bilateral 4-20-10, 6-1-10   ? CATARACT EXTRACTION W/  INTRAOCULAR LENS IMPLANT Right 04/19/2000   ? CATARACT EXTRACTION W/  INTRAOCULAR LENS IMPLANT Left 06/01/2000   ? COLONOSCOPY  02/13/2012   ? CV CORONARY ANGIOGRAM N/A 6/2/2020    Procedure: Coronary Angiogram;  Surgeon: Alona Florentino MD;  Location: [facility] Cath Lab;  Service: Cardiology   ? CV LEFT HEART CATHETERIZATION WO LEFT VETRICULOGRAM Left 6/2/2020    Procedure: Left Heart Catheterization Without Left Ventriculogram;  Surgeon: Alona Florentino MD;  Location: [facility] Cath Lab;  Service: Cardiology   ? ESOPHAGOGASTRODUODENOSCOPY  02/13/2012   ? HERNIA REPAIR  1995    Lt inguinal   ? HIP SURGERY  1981    bilat MITZI, revised 2001, 2005   ? KIDNEY SURGERY  1978, 1993    transplant   ? kidney transplant      1978, 1993   ? KNEE SURGERY Bilateral 1983, 1987   ? MOHS SURGERY     ? 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, auxiliary spleen   ? SUBTOTAL COLECTOMY  1983     10 cm, diverticulitis    ? TONSILLECTOMY            Family History   Problem Relation Age of Onset   ? Other Father         AI with valve repair   ? Hypertension Father    ? Kidney disease Father    ? Other Maternal Grandfather         cerebrovascular disease   ? Ovarian cancer Paternal Grandmother    ? Kidney disease Paternal Aunt    :       Social History     Socioeconomic History   ? Marital status:      Spouse name: Not on file   ? Number of children: Not on file   ? Years of education: Not on file   ? Highest education level: Not on file   Occupational History   ? Not on file   Social Needs   ?  Financial resource strain: Not on file   ? Food insecurity     Worry: Not on file     Inability: Not on file   ? Transportation needs     Medical: Not on file     Non-medical: Not on file   Tobacco Use   ? Smoking status: Former Smoker     Packs/day: 1.00     Years: 9.00     Pack years: 9.00     Last attempt to quit: 1988     Years since quittin.4   ? Smokeless tobacco: Former User   Substance and Sexual Activity   ? Alcohol use: Yes     Frequency: Monthly or less   ? Drug use: Never   ? Sexual activity: Not on file   Lifestyle   ? Physical activity     Days per week: Not on file     Minutes per session: Not on file   ? Stress: Not on file   Relationships   ? Social connections     Talks on phone: Not on file     Gets together: Not on file     Attends Taoism service: Not on file     Active member of club or organization: Not on file     Attends meetings of clubs or organizations: Not on file     Relationship status: Not on file   ? Intimate partner violence     Fear of current or ex partner: Not on file     Emotionally abused: Not on file     Physically abused: Not on file     Forced sexual activity: Not on file   Other Topics Concern   ? Not on file   Social History Narrative   ? Not on file   :    Social history: Patient says he is living in a group home situation in the [facility] and has onsite support there upon discharge.    Current Outpatient Medications on File Prior to Visit   Medication Sig Dispense Refill   ? acetaminophen (TYLENOL) 325 MG tablet Take 2 tablets (650 mg total) by mouth every 4 (four) hours as needed for pain or fever. 20 tablet 0   ? aspirin 81 MG EC tablet Take 81 mg by mouth daily.     ? calcium citrate-vitamin D (CITRACAL+D) 315-200 mg-unit per tablet Take 1 tablet by mouth daily.     ? clopidogrel (PLAVIX) 75 mg tablet Take 75 mg by mouth daily.     ? docusate sodium (COLACE) 100 MG capsule Take 1 capsule (100 mg total) by mouth daily. 30 capsule 0   ? entecavir  (BARACLUDE) 0.5 MG tablet Take 0.5 mg by mouth daily.     ? FLUoxetine (PROZAC) 10 MG tablet Take 10 mg by mouth daily. Take with 1 tablet with the 40mg to make it 50mg daily.     ? FLUoxetine (PROZAC) 40 MG capsule Take 40 mg by mouth daily.     ? latanoprost (XALATAN) 0.005 % ophthalmic solution Administer 1 drop to both eyes at bedtime.     ? metoprolol tartrate (LOPRESSOR) 25 MG tablet Take 0.5 tablets (12.5 mg total) by mouth 2 (two) times a day. 30 tablet 0   ? mycophenolate (CELLCEPT) 250 mg capsule Take 750 mg by mouth 2 (two) times a day.     ? nitroglycerin (NITROSTAT) 0.4 MG SL tablet Place 1 tablet (0.4 mg total) under the tongue every 5 (five) minutes as needed for chest pain. 30 tablet 0   ? OMEGA-3 FATTY ACIDS ORAL Take 1 capsule by mouth daily.     ? omeprazole (PRILOSEC OTC) 20 MG tablet Take 20 mg by mouth daily before breakfast.     ? ONE DAILY MULTIVITAMIN ORAL Take 1 tablet by mouth daily.     ? polyethylene glycol (MIRALAX) 17 gram packet Take 1 packet (17 g total) by mouth daily as needed (Constipation). 20 packet 0   ? predniSONE (DELTASONE) 5 MG tablet Take 5 mg by mouth daily.     ? rosuvastatin (CRESTOR) 20 MG tablet Take 1 tablet (20 mg total) by mouth at bedtime. 30 tablet 3   ? traMADoL (ULTRAM) 50 mg tablet Take 1 tablet (50 mg total) by mouth every 6 (six) hours as needed for pain. 18 tablet 0   ? furosemide (LASIX) 40 MG tablet Take 1 tablet (40 mg total) by mouth 2 (two) times a day at 9am and 6pm for 5 days. 5 tablet 0   ? magnesium oxide (MAG-OX) 400 mg (241.3 mg magnesium) tablet Take 1 tablet (400 mg total) by mouth daily for 5 days. 5 tablet 0     No current facility-administered medications on file prior to visit.    :      ALLERGIES:  Penicillins; Cephalexin; Sulfa (sulfonamide antibiotics); and Tetracycline    Vitals:    Vital signs: Reviewed per facility EMR vitals including as follows:              Blood pressure 98/78 his systolics have been running between 98 and 135  generally better in the last day or so.  Respirations 17 heart rate 72 temperature 98.2 O2 sats 97% on room air weight 163 pounds.  Notably he presented at 168 pounds on June 14.  There is no height or weight on file to calculate BMI.    Physical exam:    General:  Alert  oriented x3 appears in no acute distress    HEENT:  NC/AT, sclera clear, EOMI gaze is conjugate,   Neck:  no mass, adenopathy, thyromegaly no neck vein distention  Chest: Sternal scar appears to have skin intact healing well without breakdown scabbing swelling erythema.  His chest tube sites in the upper abdomen are scabbed without drainage fluctuance  Heart:  rhythm: Regular rate: Near 80 m/g/r: None appreciated Pmi:  palpable in precordium   Lungs: Decreased breath sounds bases but moving air well without rales rhonchi or wheezing  Abd:  nontender, nondistended, bowel sounds audible and wnls, no mass, no organomegaly     Ext:  perfusion/pulses/capillary refill           Edema none  Skin:  wounds clear in visualized areas except as noted above and below            Warm, dry, pink, intact     Interesting linear bruising pattern of the ankle and lower leg mostly anteriorly.  Vein donor sites clean and dry  Neuro:  as above               Moves all 4 extremities.  facies symmetric  Due to the 2020 Covid 19 pandemic, except as noted above, the patient was visually observed at a 6 foot plus distance.  An observational exam was performed in an effort to keep patient safe from Covid 19 and other communicable diseases.   Labs:  Lab Results   Component Value Date    WBC 10.9 06/16/2020    HGB 12.7 (L) 06/16/2020    HCT 40.4 06/16/2020    MCV 91 06/16/2020     (H) 06/16/2020     Results for orders placed or performed in visit on 06/16/20   Basic Metabolic Panel   Result Value Ref Range    Sodium 134 (L) 136 - 145 mmol/L    Potassium 4.1 3.5 - 5.0 mmol/L    Chloride 97 (L) 98 - 107 mmol/L    CO2 28 22 - 31 mmol/L    Anion Gap, Calculation 9 5 - 18  mmol/L    Glucose 106 70 - 125 mg/dL    Calcium 9.6 8.5 - 10.5 mg/dL    BUN 24 (H) 8 - 22 mg/dL    Creatinine 0.96 0.70 - 1.30 mg/dL    GFR MDRD Af Amer >60 >60 mL/min/1.73m2    GFR MDRD Non Af Amer >60 >60 mL/min/1.73m2         No results found for: TSH  Lab Results   Component Value Date    HGBA1C 5.6 06/03/2020     [unfilled]  No results found for: [id number]  No results found for: BNP  [unfilled]  Most Recent EKG     Units 06/08/20  1336   VENTRATE BPM 97   ATRIALRATE BPM 97   QRSDURATION ms 134   QTINTERVAL ms 440   QTCCALC ms 558   P [place] degrees 28   RAXIS degrees -25   TAXIS degrees -7   MUSEDX  Normal sinus rhythm  Right bundle branch block  Inferior infarct , age undetermined  Anterolateral infarct , age undetermined  Abnormal ECG  When compared with ECG of 28-MAY-2020 12:19,  Significant changes have occurred  Confirmed by YANDY TREVINO, BRET LOC: ([id number]) on 6/8/2020 3:42:01 PM         Assessment/Plan:      ICD-10-CM    1. Coronary artery disease involving other coronary artery bypass graft, angina presence unspecified  I25.810    2. Chronic viral hepatitis B without delta agent and without coma (H)  B18.1    3. End stage renal disease (H)  N18.6    4. Essential hypertension  I10    5. NSTEMI (non-ST elevated myocardial infarction) (H)  I21.4        Coronary artery disease  CABG x3 June 8, 2020   VIEYRA to LAD   R SVG to PL   R SVG to PDA  EVH from LLE  History of non-STEMI 2014, 2020   Patient appears to be doing quite well at this point.  Wounds are healing well.  His function is returning without any apparent complications.  - Dual antiplatelets aspirin plus Plavix in place at this time  - Crestor 20 mg daily  - Received 5 days of Lasix which has come to and and, though he did refuse some of that.  Considering his low blood pressure and 5 pound weight loss he may have been right to do so.  - Continue acetaminophen, continue tramadol for now anticipated taper off before discharge from TCU  - Metoprolol  12.5 mg twice daily  -Minimal postop anemia 12.7 not requiring treatment, spontaneous resolution anticipated    Bilateral renal transplant due to glomerulosclerosis   -Chronic immunosuppression  Chronic steroid dependence  Secondary hyperparathyroidism  Secondary osteoporosis, presumed  Splenectomy   Patient's kidney function held up well.  GFR estimated greater than 60 on 6/16/2020 with a creatinine 0.96  -Avoid nephrotoxins  -Patient is not on directed osteoporosis therapy.  He thinks he might of been on Fosamax at one point.  Unclear if he has been lost to follow-up?  I strongly encouraged him to follow-up with his transplant clinic with a question for what if anything he should be doing for bone health.  Continue calcium and vitamin D in the meanwhile  -Patient reports that splenectomy was done at the time of his transplant and felt to be an important part of the success at that time in history.  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    GERD  History of gastric ulcer  History of upper GI bleed   Continue omeprazole.  Patient is asymptomatic at this time    Dyslipidemia   Crestor as above    Glaucoma   No change in his latanoprost    Subtotal colectomy   Patient's GI function is returning toward his normal.    JULIANE   Untreated.  Patient says he had CPAP in the past but does not use it.    Low blood pressures   At times with some symptoms of orthostasis.  Lasix is discontinued at this time.  We discussed safety measures to prevent orthostatic drops.  Monitor intake and fluids.  Continue beta-blocker as tolerated.    Chronic hepatitis B   Entecavir anticipated lifelong      Discussed with facility staff      Geronmio Strickland MD

## 2021-06-21 NOTE — PROGRESS NOTES
[facility]      Facility:    [facility] [[id number]]  Code Status: FULL CODE      Chief Complaint/Reason for Visit:  Chief Complaint   Patient presents with     H & P       HPI:   Jerad is a 58 y.o. male who was hospitalized due to acute back pain which worsened over the course of a day.  It became excruciating.  It was in the middle of the lumbar spine.  Upon extensive evaluation at the hospital it was determined that he had left L4-5 interspinous bursal cyst with adjacent reactive epidural thickening and paraspinous cellulitis.  Interventional radiology aspiration of the cyst did not lead to any growth detected on cultures, but infectious disease recommended continuation of ceftriaxone for 4 weeks.  When he presented to the hospital his temperature was 100.8.  He has other medical conditions of donor renal transplant with baseline creatinine of 0.8-1.1, hypertension, coronary artery disease, anemia and chronic renal disease, vitamin D deficiency, chronic hepatitis B, basal cell carcinoma and squamous cell skin carcinoma, and history of depression for which she takes fluoxetine.  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  He also has a history of Clostridium difficile colitis.  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  He also has a history of gastric ulcer with hemorrhage.  He does have glaucoma.  He is in an immunosuppressed state due to kidney transplant.  He has a history of non-STEMI.    Upon current review of systems the back pain is dramatically improved.  He has no fevers or chills.  He does not have sore throat.  He does not have cough or shortness of breath.  Does not have chest pain or palpitations of the heart.  He does not have abdominal pain or nausea.  He has no diarrhea.     Past Medical History:  Past Medical History:    Diagnosis Date     Acute midline low back pain without sciatica      AION (acute ischemic optic neuropathy)      Anemia in chronic renal disease      Avascular necrosis of bones of both hips (H)     s/p bilateral hip replacements     Basal cell carcinoma      Chronic hepatitis B (H)      Clostridium difficile colitis      Coronary artery disease involving native coronary artery of native heart without angina pectoris 06/17/2014    Coronary angiogram 6/17/14: Severe distal 3-vessel disease involving small vessels, not amenable to PCI or CABG     Depression      Diverticulosis      Dyslipidemia      Elevated C-reactive protein (CRP)      FSGS (focal segmental glomerulosclerosis)      Gastric ulcer with hemorrhage 02/12/2012     GERD (gastroesophageal reflux disease)      Glaucoma      Hemorrhoids      HTN (hypertension)      Hypogonadism in male      Kidney transplanted     focal glomerulosclerosis      NSTEMI (non-ST elevated myocardial infarction) (H) 06/17/2014     JULIANE (obstructive sleep apnea)      Paracentral scotoma     LE     Secondary renal hyperparathyroidism (H)      Septic bursitis      Squamous cell carcinoma            Surgical History:  Past Surgical History:   Procedure Laterality Date     APPENDECTOMY       CATARACT EXTRACTION EXTRACAPSULAR W/ INTRAOCULAR LENS IMPLANTATION Bilateral 4-20-10, 6-1-10     CATARACT EXTRACTION W/  INTRAOCULAR LENS IMPLANT Right 04/19/2000     CATARACT EXTRACTION W/  INTRAOCULAR LENS IMPLANT Left 06/01/2000     COLONOSCOPY  02/13/2012     ESOPHAGOGASTRODUODENOSCOPY  02/13/2012     HERNIA REPAIR  1995    Lt inguinal     HIP SURGERY  1981    bilat MITZI, revised 2001, 2005     KIDNEY SURGERY  1978, 1993    transplant     KNEE SURGERY Bilateral 1983, 1987     MOHS SURGERY       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, auxiliary spleen     SUBTOTAL COLECTOMY  1983     10 cm, diverticulitis      TONSILLECTOMY         Family History:   Family History   Problem Relation Age of Onset      Other Father         AI with valve repair     Hypertension Father      Kidney disease Father      Other Maternal Grandfather         cerebrovascular disease     Ovarian cancer Paternal Grandmother      Kidney disease Paternal Aunt        Social History:    Social History     Socioeconomic History     Marital status:      Spouse name: Not on file     Number of children: Not on file     Years of education: Not on file     Highest education level: Not on file   Social Needs     Financial resource strain: Not on file     Food insecurity - worry: Not on file     Food insecurity - inability: Not on file     Transportation needs - medical: Not on file     Transportation needs - non-medical: Not on file   Occupational History     Not on file   Tobacco Use     Smoking status: Former Smoker     Packs/day: 1.00     Years: 9.00     Pack years: 9.00     Last attempt to quit: 1988     Years since quittin.8     Smokeless tobacco: Former User   Substance and Sexual Activity     Alcohol use: Yes     Frequency: Monthly or less     Drug use: Not on file     Sexual activity: Not on file   Other Topics Concern     Not on file   Social History Narrative     Not on file          Review of Systems   All other systems reviewed and are negative.      Vitals:    18 1455   BP: 135/85   Pulse: 63   Temp: 98.9  F (37.2  C)   SpO2: 95%       Physical Exam   Constitutional: No distress.   HENT:   Mouth/Throat: Oropharynx is clear and moist.   Eyes: Right eye exhibits no discharge. Left eye exhibits no discharge.   Neck: No JVD present. No thyromegaly present.   Cardiovascular: Normal heart sounds.   Pulmonary/Chest: Breath sounds normal. No respiratory distress.   Abdominal: Soft. Bowel sounds are normal. He exhibits no distension. There is no tenderness.   Musculoskeletal: He exhibits no edema.   Lymphadenopathy:     He has no cervical adenopathy.   Neurological: He is alert.   Skin: Skin is warm and dry.   Psychiatric: He  has a normal mood and affect.   Nursing note and vitals reviewed.      Medication List:  Current Outpatient Medications   Medication Sig     acetaminophen (TYLENOL) 325 MG tablet Take 325-650 mg by mouth every 6 (six) hours as needed for pain (1 tab pain 3-6 2tab pain 7-10).     albuterol (PROAIR HFA;PROVENTIL HFA;VENTOLIN HFA) 90 mcg/actuation inhaler Inhale 2 puffs every 6 (six) hours as needed for wheezing.     amLODIPine (NORVASC) 5 MG tablet Take 5 mg by mouth daily.     aspirin 81 MG EC tablet Take 81 mg by mouth daily.     atorvastatin (LIPITOR) 20 MG tablet Take 20 mg by mouth at bedtime.     budesonide (PULMICORT) 90 mcg/actuation inhaler Inhale 2 puffs 2 (two) times a day.     calcium citrate-vitamin D (CITRACAL+D) 315-200 mg-unit per tablet Take 1 tablet by mouth daily.     cefTRIAXone 2 gram SolR Infuse 2,000 mg into a venous catheter daily.     cholecalciferol, vitamin D3, 1,000 unit tablet Take by mouth daily.     clopidogrel (PLAVIX) 75 mg tablet Take 75 mg by mouth daily.     docusate sodium (COLACE) 100 MG capsule Take 100 mg by mouth 2 (two) times a day.     entecavir (BARACLUDE) 0.5 MG tablet Take 0.5 mg by mouth daily.     FLUoxetine (PROZAC) 40 MG capsule Take 40 mg by mouth daily.     fluticasone (FLONASE) 50 mcg/actuation nasal spray Apply 1 spray into each nostril daily.     fluticasone-salmeterol (ADVAIR) 250-50 mcg/dose DISKUS Inhale 1 puff every 12 (twelve) hours.     isosorbide mononitrate (IMDUR) 30 MG 24 hr tablet Take 15 mg by mouth daily.     latanoprost (XALATAN) 0.005 % ophthalmic solution Administer 1 drop to both eyes at bedtime.     mycophenolate (CELLCEPT) 250 mg capsule Take 750 mg by mouth 2 (two) times a day.     OMEGA-3 FATTY ACIDS ORAL Take 1 capsule by mouth daily.     omeprazole (PRILOSEC OTC) 20 MG tablet Take 20 mg by mouth daily before breakfast.     ONE DAILY MULTIVITAMIN ORAL Take 1 tablet by mouth daily.     oxyCODONE (ROXICODONE) 5 MG immediate release tablet Take  5-10 mg by mouth every 6 (six) hours as needed for pain (1TAB Pain 3-6 2tab pain 7-10).     polyethylene glycol (MIRALAX) 17 gram packet Take 17 g by mouth daily.     predniSONE (DELTASONE) 5 MG tablet Take 5 mg by mouth daily.       Labs:  No new laboratory testing    Assessment:    ICD-10-CM    1. Septic bursitis M71.10    2. Kidney transplant status, cadaveric Z94.0    3. Hepatitis B infection without delta agent without hepatic coma, unspecified chronicity B19.10    4. Depression, unspecified depression type F32.9    5. Hypertension, unspecified type I10    6. Coronary artery disease, angina presence unspecified, unspecified vessel or lesion type, unspecified whether native or transplanted heart I25.10        Plan:  I advised starting probiotic which will be lactobacillus 15 billion colonies twice daily to be continued until after the completion of ceftriaxone.  He is looking into IV antibiotics for home and plans to discharge in the near future with skilled nursing, home health aide, occupational therapy, physical therapy in the home skilled nursing is particularly to manage the IV antibiotics as well as checking biweekly CRP and CBC as requested by infectious disease consulting.  I did write a prescription for oxycodone for discharge of 5 mg 1-2 tablets every 6 hours as needed pain, #20.      Electronically signed by: Konrad Wolff MD

## 2021-06-21 NOTE — PROGRESS NOTES
[facility]    NAME:  Jerad Ross             :  1960  MRN: [id number]  CODE STATUS:  FULL CODE    FACILITY:  [facility] [[id number]]       ROOM:   105    CHIEF COMPLAINT/REASON FOR VISIT:  Chief Complaint   Patient presents with     Problem Visit     Follow up on Discharge     HISTORY OF PRESENT ILLNESS: Jerad Ross is a 58 y.o. male with FSGS s/p  donor renal transplant (baseline Cr 0.8-1.1), HTN, CAD, Chronic cough, anemia in chronic renal disease, Vitamin D deficiency, Chronic hepatitis B, Basal cell carcinoma, Squamous cell skin carcinoma, who was admitted 2018 for acute mid-lumbar back pain and fever up to 100.8. Lumbar spine MRI showed L4-5 interspinous bursal cyst with adjacent reactive epidural thickening and paraspinous cellulitis. Transplant ID followed and recommended ceftriaxone. Neurosurgery consulted, recommended no surgical intervention. IR performed aspiration of the cyst on  to clarify diagnosis, but after several days of antibiotics, no growth was detected on cultures. He remained on ceftriaxone ID throughout hospitalization. PICC line was placed for continued outpatient IV ceftriaxone treatment with a total planned course of 4 weeks. He discharged to TCU for rehab on 11/10 in stable condition with improved pain.    The following problems were addressed during his hospitalization:     Infected L4-5 interspinous bursal cyst   Paraspinous cellulitis   Gram stain and culture sterile to date.   - Continue ceftriaxone 2g IV daily for total of 4 weeks of antibiotics (through 18)  - Pain control with oxycodone 5-10 mg q6 hrs prn  - Avoid ibuprofen/NSAIDS given renal transplant     Atelectasis  Patient had some crackles on exam but no evidence of pneumonia. Improved with use of incentive spirometer. Expect this will resolve with increased therapy and movement.  - Continue incentive spirometry  - Frequent walking as able      DDKT  (Baseline creatinine 0.8-1.1)  Cr 0.69-0.76 while in hospital. Transplant nephrology elected to continue CellCept at PTA dosing despite infection since it is a minimal dose.  - Continue CellCept 750 mg BID  - Continue prednisone 5mg daily  - Avoid nephrotoxic agents      Chronic Hepatitis B  Continue PTA entecavir      Major Depressive Disorder  Continue PTA Fluoxetine      CAD  HTN  Continue PTA IMDUR, Plavix, ASA 81, Amlodipine, lipitor      Restrictive pattern on PFTs  PFTs showing mildly restrictive pattern, seen by pulmonology who said it could be mild asthma.  - Continue PTA advair, PRN albuterol    Discharge Pain Plan:  - During his hospitalization, Jerad experienced pain due to L4-5 interspinous bursal cyst. The pain plan for discharge was discussed with Jerad and the plan was created in a collaborative fashion.   - Oxycodone 5-10 mg q6 hrs prn    Patient was stabilized and transferred to TCU for continued rehabilitation.    Today, patient is seen at the bedside.  He will discharge to home later this afternoon.  He has no questions or concerns regarding discharge. Staff has no concerns.    Past Medical History:   Diagnosis Date     Acute midline low back pain without sciatica      AION (acute ischemic optic neuropathy)      Anemia in chronic renal disease      Avascular necrosis of bones of both hips (H)     s/p bilateral hip replacements     Basal cell carcinoma      Chronic hepatitis B (H)      Clostridium difficile colitis      Coronary artery disease involving native coronary artery of native heart without angina pectoris 06/17/2014    Coronary angiogram 6/17/14: Severe distal 3-vessel disease involving small vessels, not amenable to PCI or CABG     Depression      Diverticulosis      Dyslipidemia      Elevated C-reactive protein (CRP)      FSGS (focal segmental glomerulosclerosis)      Gastric ulcer with hemorrhage 02/12/2012     GERD (gastroesophageal reflux disease)      Glaucoma      Hemorrhoids       HTN (hypertension)      Hypogonadism in male      Kidney transplanted     focal glomerulosclerosis      NSTEMI (non-ST elevated myocardial infarction) (H) 06/17/2014     JULIANE (obstructive sleep apnea)      Paracentral scotoma     LE     Secondary renal hyperparathyroidism (H)      Septic bursitis      Squamous cell carcinoma         Past Surgical History:   Procedure Laterality Date     APPENDECTOMY       CATARACT EXTRACTION EXTRACAPSULAR W/ INTRAOCULAR LENS IMPLANTATION Bilateral 4-20-10, 6-1-10     CATARACT EXTRACTION W/  INTRAOCULAR LENS IMPLANT Right 04/19/2000     CATARACT EXTRACTION W/  INTRAOCULAR LENS IMPLANT Left 06/01/2000     COLONOSCOPY  02/13/2012     ESOPHAGOGASTRODUODENOSCOPY  02/13/2012     HERNIA REPAIR  1995    Lt inguinal     HIP SURGERY  1981    bilat MITZI, revised 2001, 2005     KIDNEY SURGERY  1978, 1993    transplant     KNEE SURGERY Bilateral 1983, 1987     MOHS SURGERY       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, auxiliary spleen     SUBTOTAL COLECTOMY  1983     10 cm, diverticulitis      TONSILLECTOMY          Family History   Problem Relation Age of Onset     Other Father         AI with valve repair     Hypertension Father      Kidney disease Father      Other Maternal Grandfather         cerebrovascular disease     Ovarian cancer Paternal Grandmother      Kidney disease Paternal Aunt         Social History     Socioeconomic History     Marital status:      Spouse name: Not on file     Number of children: Not on file     Years of education: Not on file     Highest education level: Not on file   Social Needs     Financial resource strain: Not on file     Food insecurity - worry: Not on file     Food insecurity - inability: Not on file     Transportation needs - medical: Not on file     Transportation needs - non-medical: Not on file   Occupational History     Not on file   Tobacco Use     Smoking status: Former Smoker     Packs/day: 1.00     Years: 9.00     Pack years: 9.00     Last attempt  to quit: 1988     Years since quittin.9     Smokeless tobacco: Former User   Substance and Sexual Activity     Alcohol use: Yes     Frequency: Monthly or less     Drug use: Not on file     Sexual activity: Not on file   Other Topics Concern     Not on file   Social History Narrative     Not on file     Allergies   Allergen Reactions     Penicillins Hives and Shortness Of Breath     Cephalexin      Sulfa (Sulfonamide Antibiotics)      Tetracycline         Current Outpatient Medications   Medication Sig Dispense Refill     acetaminophen (TYLENOL) 325 MG tablet Take 325-650 mg by mouth every 6 (six) hours as needed for pain (1 tab pain 3-6 2tab pain 7-10).       albuterol (PROAIR HFA;PROVENTIL HFA;VENTOLIN HFA) 90 mcg/actuation inhaler Inhale 2 puffs every 6 (six) hours as needed for wheezing.       amLODIPine (NORVASC) 5 MG tablet Take 5 mg by mouth daily.       aspirin 81 MG EC tablet Take 81 mg by mouth daily.       atorvastatin (LIPITOR) 20 MG tablet Take 20 mg by mouth at bedtime.       budesonide (PULMICORT) 90 mcg/actuation inhaler Inhale 2 puffs 2 (two) times a day.       calcium citrate-vitamin D (CITRACAL+D) 315-200 mg-unit per tablet Take 1 tablet by mouth daily.       cefTRIAXone 2 gram SolR Infuse 2,000 mg into a venous catheter daily.       cholecalciferol, vitamin D3, 1,000 unit tablet Take by mouth daily.       clopidogrel (PLAVIX) 75 mg tablet Take 75 mg by mouth daily.       docusate sodium (COLACE) 100 MG capsule Take 100 mg by mouth 2 (two) times a day.       entecavir (BARACLUDE) 0.5 MG tablet Take 0.5 mg by mouth daily.       FLUoxetine (PROZAC) 40 MG capsule Take 40 mg by mouth daily.       fluticasone (FLONASE) 50 mcg/actuation nasal spray Apply 1 spray into each nostril daily.       fluticasone-salmeterol (ADVAIR) 250-50 mcg/dose DISKUS Inhale 1 puff every 12 (twelve) hours.       isosorbide mononitrate (IMDUR) 30 MG 24 hr tablet Take 15 mg by mouth daily.       latanoprost (XALATAN)  0.005 % ophthalmic solution Administer 1 drop to both eyes at bedtime.       mycophenolate (CELLCEPT) 250 mg capsule Take 750 mg by mouth 2 (two) times a day.       OMEGA-3 FATTY ACIDS ORAL Take 1 capsule by mouth daily.       omeprazole (PRILOSEC OTC) 20 MG tablet Take 20 mg by mouth daily before breakfast.       ONE DAILY MULTIVITAMIN ORAL Take 1 tablet by mouth daily.       oxyCODONE (ROXICODONE) 5 MG immediate release tablet Take 5-10 mg by mouth every 6 (six) hours as needed for pain (1TAB Pain 3-6 2tab pain 7-10).       polyethylene glycol (MIRALAX) 17 gram packet Take 17 g by mouth daily.       predniSONE (DELTASONE) 5 MG tablet Take 5 mg by mouth daily.       No current facility-administered medications for this visit.      REVIEW OF SYSTEMS:    Currently, no fever, chills, or rigors. Does not have any visual or hearing problems. Denies any chest pain, headaches, palpitations, lightheadedness, dizziness, shortness of breath, or cough. Appetite is good. Denies any GERD symptoms. Denies any difficulty with swallowing, nausea, or vomiting.  Denies any abdominal pain, diarrhea or constipation. Denies any urinary symptoms. No insomnia. No active bleeding. No rash.       PHYSICAL EXAMINATION:  Vitals:    11/19/18 1044   BP: 130/86   Pulse: 66   Resp: 16   Temp: 97.7  F (36.5  C)   SpO2: 97%   Weight: 170 lb 4.8 oz (77.2 kg)       GENERAL: Awake, Alert, oriented x3, not in any form of acute distress, answers questions appropriately, follows simple commands, conversant  HEENT: Head is normocephalic with normal hair distribution. No evidence of trauma. Ears: No acute purulent discharge. Eyes: Conjunctivae pink with no scleral jaundice. Nose: Normal mucosa and septum. NECK: Supple with no cervical or supraclavicular lymphadenopathy. Trachea is midline.   CHEST: No tenderness or deformity, no crepitus  LUNG: Clear to auscultation with good chest expansion. There are no crackles or wheezes, normal AP diameter.  BACK: No  kyphosis of the thoracic spine. Symmetric, no curvature, ROM normal, no CVA tenderness, no spinal tenderness   CVS: There is good S1  S2, there are no murmurs, rubs, gallops, or heaves, rhythm is regular,  2+ pulses symmetric in all extremities.  ABDOMEN: Globular and soft, nontender to palpation, non distended, no masses, no organomegaly, good bowel sounds, no rebound or guarding, no peritoneal signs.   EXTREMITIES:  Full range of motion on both upper and lower extremities, there is no tenderness to palpation, no pedal edema, no cyanosis or clubbing, no calf tenderness.  Pulses equal in all extremities, normal cap refill, no joint swelling.  SKIN: Warm and dry, no erythema noted.  Skin color, texture, no rashes or lesions.  NEUROLOGICAL: The patient is oriented to person, place and time. Strength and sensation are grossly intact. Face is symmetric.    LABS:      Lab Results   Component Value Date    WBC 6.8 11/12/2018    HGB 11.0 (L) 11/12/2018    HCT 36.0 (L) 11/12/2018    MCV 91 11/12/2018     11/12/2018     ASSESSMENT/PLAN:    1. Septic bursitis - Infected L4-5 interspinous bursal cyst coupled with Paraspinous cellulitis.  Will continue Ceftriaxone 2g IV daily for total of 4 weeks of antibiotics (through 12/4/18).   Pain control with Oxycodone 5-10 mg q6 hrs prn.  Will avoid ibuprofen/NSAIDS given renal transplant   2. Kidney transplant status, cadaveric - Stable on Cellcept   3. Hypertension, unspecified type - Blood pressures are within target range, will continue Imdur and Amlodipine.   4. Depression, unspecified depression type - Mood stable on Fluoxetine   5. Coronary artery disease, angina presence unspecified, unspecified vessel or lesion type, unspecified whether native or transplanted heart -  No overt signs or symptoms of decompensation, will continue Imdur, Plavix, ASA, Amlodipine, and Lipitor   6. Hepatitis B infection without delta agent without hepatic coma, unspecified chronicity - Will  continue Entecavir               Electronically signed by:  Verona Tamez, CNP

## 2021-06-29 NOTE — PROGRESS NOTES
"Progress Notes by Anali Lundberg MD at 7/21/2020  9:30 AM     Author: Anali Lundberg MD Service: -- Author Type: Physician    Filed: 7/21/2020 10:07 AM Encounter Date: 7/21/2020 Status: Signed    : Anali Lundberg MD (Physician)           The patient has been notified of following:     \"This video visit will be conducted via a call between you and your physician/provider. We have found that certain health care needs can be provided without the need for an in-person physical exam.  This service lets us provide the care you need with a video conversation.  If a prescription is necessary we can send it directly to your pharmacy.  If lab work is needed we can place an order for that and you can then stop by our lab to have the test done at a later time.      Patient has given verbal consent to a Video visit? Yes    HEART CARE VIDEO ENCOUNTER        The patient has chosen to have the visit conducted as a video visit, to reduce risk of exposure given the current status of Coronavirus in our community. This video visit is being conducted via a call between the patient and physician/provider. Health care needs are being provided without a physical exam.     Assessment/Recommendations   Assessment/Plan:  1. Coronary artery disease involving native coronary artery of native heart without angina pectoris -he was experiencing accelerated angina and underwent nuclear stress test showing a medium-sized area of moderate ischemia involving inferior and inferolateral walls.  This prompted coronary angiography which showed normal left main, mid LAD 70% stenosis followed by distal sequential 70%, 70%, 50% and 80% lesions.  First diagonal had a 70% lesion followed by a total occlusion filling via collaterals.  Third septal was totally occluded.  The circumflex had a mid 30% lesion with a second obtuse marginal artery 99% lesion as well as third obtuse marginal artery 90% lesion.  Right coronary artery had a mid 70% " with distal 90% lesion, with a PDA having sequential 70% and 90% lesions, the right posterior AV artery 40% lesion, and the third right posterior lateral branch 60% lesion.  This prompted coronary artery bypass grafting.   2. S/P CABG (coronary artery bypass graft) -June 8 patient underwent coronary bypass grafting with a LIMA to the LAD, vein graft to the right PDA, and vein graft to the right posterior lateral.  Driving restrictions and lifting restrictions were released this week.  He is planning on going back to work.   3. S/P kidney transplant -on 2 separate occasions and need to continue to watch blood pressure.   4. Essential hypertension -most recently 110/72 and getting along well, consider backing off on metoprolol if he is lightheaded.   5. Pure hypercholesterolemia -cholesterol excellent at 133 with an LDL of 59.       Plan  1.  Re-enroll in cardiac rehab, let me know if blood pressure or chest discomfort issues.  2.  Consider discontinuing Plavix 6 months postop and metoprolol 12 months postop.  Follow Up Plan: 5 months  I have reviewed the note as documented.  This accurately captures the substance of my conversation with the patient.    Total time of video between patient and provider was 19 minutes   Start Time: 0942  Stop Time: 1001    Originating Location (pt. Location): Home    Distant Location (provider location):  [facility]     Mode of Communication:  Video Conference via [email or web address]       History of Present Illness/Subjective    Jerad Ross is a 60 y.o. male who is being evaluated via a billable video visit and has consented to a video visit. Jerad Ross has a history of renal transplant on 2 separate occasions, chest discomfort with abnormal nuclear stress test and subsequent coronary artery bypass grafting following angiography.  He lives in a group home, works as an , has been active doing some weeding outside.  For the most part has an occasional orthostatic  lightheadedness and also has some incisional chest discomfort.  He does have some shortness of breath and heavy activity but denies any significant angina, palpitations, PND, orthopnea, syncope, dizziness or peripheral edema.      I have reviewed and updated the patient's Past Medical History, Social History, Family History and Medication List.     Physical Examination performed via live video encounter Review of Systems   General Appearance:   no distress, normal body habitus, upright.   ENT/Mouth: membranes moist, no nasal discharge or bleeding gums.  Normal head shape, no evidence of injury or laceration.     EYES:  no scleral icterus, normal conjunctivae   Neck: no evidence of thyromegaly.  Supple   Chest/Lungs:   No audible wheezing equal chest wall expansion. Non labored breathing.  No cough.   Cardiovascular:   No evidence of elevated jugular venous pressure.  No evidence of pitting edema bilaterally    Abdomen:  no evidence of abdominal distention. No observe juandice.     Extremities: no cyanosis or clubbing noted.    Skin: no xanthelasma, normal skin collar. No evidence of facial lacerations.      Neurologic: Normal arm motion bilateral, no tremors.  No evidence of focal defect.       Psychiatric: alert and oriented x3, calm     Review of system done with patient over the phone. Positive for shortness of breath with activity, chest pain, constipation, joint pain, daytime sleepiness, dizziness, and loss of balance. All other review of systems within normal range.                                         Medical History  Surgical History Family History Social History   Past Medical History:   Diagnosis Date   ? Acute midline low back pain without sciatica    ? AION (acute ischemic optic neuropathy)    ? Anemia in chronic renal disease    ? Avascular necrosis of bones of both hips (H)     s/p bilateral hip replacements   ? Basal cell carcinoma    ? Chronic hepatitis B (H)    ? Clostridium difficile colitis     ? Coronary artery disease involving native coronary artery of native heart without angina pectoris 06/17/2014    Coronary angiogram 6/17/14: Severe distal 3-vessel disease involving small vessels, not amenable to PCI or CABG   ? Depression    ? Diverticulosis    ? Dyslipidemia    ? Elevated C-reactive protein (CRP)    ? FSGS (focal segmental glomerulosclerosis)    ? Gastric ulcer with hemorrhage 02/12/2012   ? GERD (gastroesophageal reflux disease)    ? Glaucoma    ? Hemorrhoids    ? HTN (hypertension)    ? Hyperlipidemia    ? Hypogonadism in male    ? Kidney transplanted     focal glomerulosclerosis    ? NSTEMI (non-ST elevated myocardial infarction) (H) 06/17/2014   ? JULIANE (obstructive sleep apnea)    ? Paracentral scotoma     LE   ? Secondary renal hyperparathyroidism (H)    ? Septic bursitis    ? Squamous cell carcinoma     Past Surgical History:   Procedure Laterality Date   ? APPENDECTOMY     ? CATARACT EXTRACTION EXTRACAPSULAR W/ INTRAOCULAR LENS IMPLANTATION Bilateral 4-20-10, 6-1-10   ? CATARACT EXTRACTION W/  INTRAOCULAR LENS IMPLANT Right 04/19/2000   ? CATARACT EXTRACTION W/  INTRAOCULAR LENS IMPLANT Left 06/01/2000   ? COLONOSCOPY  02/13/2012   ? CV CORONARY ANGIOGRAM N/A 6/2/2020    Procedure: Coronary Angiogram;  Surgeon: Alona Florentino MD;  Location: [facility];  Service: Cardiology   ? CV LEFT HEART CATHETERIZATION WO LEFT VETRICULOGRAM Left 6/2/2020    Procedure: Left Heart Catheterization Without Left Ventriculogram;  Surgeon: Alona Florentino MD;  Location: [facility];  Service: Cardiology   ? ESOPHAGOGASTRODUODENOSCOPY  02/13/2012   ? HERNIA REPAIR  1995    Lt inguinal   ? HIP SURGERY  1981    bilat MITZI, revised 2001, 2005   ? KIDNEY SURGERY  1978, 1993    transplant   ? kidney transplant      1978, 1993   ? KNEE SURGERY Bilateral 1983, 1987   ? MOHS SURGERY     ? 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, auxiliary spleen   ? SUBTOTAL COLECTOMY  1983     10 cm, diverticulitis    ?  TONSILLECTOMY      Family History   Problem Relation Age of Onset   ? Other Father         AI with valve repair   ? Hypertension Father    ? Kidney disease Father    ? Other Maternal Grandfather         cerebrovascular disease   ? Ovarian cancer Paternal Grandmother    ? Kidney disease Paternal Aunt       Social History     Socioeconomic History   ? Marital status:      Spouse name: Not on file   ? Number of children: Not on file   ? Years of education: Not on file   ? Highest education level: Not on file   Occupational History   ? Not on file   Social Needs   ? Financial resource strain: Not on file   ? Food insecurity     Worry: Not on file     Inability: Not on file   ? Transportation needs     Medical: Not on file     Non-medical: Not on file   Tobacco Use   ? Smoking status: Former Smoker     Packs/day: 1.00     Years: 9.00     Pack years: 9.00     Last attempt to quit: 1988     Years since quittin.5   ? Smokeless tobacco: Former User   Substance and Sexual Activity   ? Alcohol use: Yes     Frequency: Monthly or less   ? Drug use: Never   ? Sexual activity: Not on file   Lifestyle   ? Physical activity     Days per week: Not on file     Minutes per session: Not on file   ? Stress: Not on file   Relationships   ? Social connections     Talks on phone: Not on file     Gets together: Not on file     Attends [employer] service: Not on file     Active member of club or organization: Not on file     Attends meetings of clubs or organizations: Not on file     Relationship status: Not on file   ? Intimate partner violence     Fear of current or ex partner: Not on file     Emotionally abused: Not on file     Physically abused: Not on file     Forced sexual activity: Not on file   Other Topics Concern   ? Not on file   Social History Narrative   ? Not on file          Medications  Allergies   Current Outpatient Medications   Medication Sig Dispense Refill   ? acetaminophen (TYLENOL) 325 MG tablet Take 2  tablets (650 mg total) by mouth every 4 (four) hours as needed for pain or fever. 20 tablet 0   ? aspirin 81 MG EC tablet Take 81 mg by mouth daily.     ? calcium citrate-vitamin D (CITRACAL+D) 315-200 mg-unit per tablet Take 1 tablet by mouth daily.     ? clopidogrel (PLAVIX) 75 mg tablet Take 75 mg by mouth daily.     ? entecavir (BARACLUDE) 0.5 MG tablet Take 0.5 mg by mouth daily.     ? FLUoxetine (PROZAC) 10 MG tablet Take 10 mg by mouth daily. Take with 1 tablet with the 40mg to make it 50mg daily.     ? FLUoxetine (PROZAC) 40 MG capsule Take 40 mg by mouth daily.     ? latanoprost (XALATAN) 0.005 % ophthalmic solution Administer 1 drop to both eyes at bedtime.     ? metoprolol tartrate (LOPRESSOR) 25 MG tablet TAKE 1/2 TABLET BY MOUTH TWICE DAILY (Patient taking differently: 12.5 mg. ) 90 tablet 0   ? mycophenolate (CELLCEPT) 250 mg capsule Take 750 mg by mouth 2 (two) times a day.     ? nitroglycerin (NITROSTAT) 0.4 MG SL tablet Place 1 tablet (0.4 mg total) under the tongue every 5 (five) minutes as needed for chest pain. 30 tablet 0   ? OMEGA-3 FATTY ACIDS ORAL Take 1 capsule by mouth daily.     ? omeprazole (PRILOSEC OTC) 20 MG tablet Take 20 mg by mouth daily before breakfast.     ? ONE DAILY MULTIVITAMIN ORAL Take 1 tablet by mouth daily.     ? pantoprazole (PROTONIX) 40 MG tablet      ? polyethylene glycol (MIRALAX) 17 gram packet Take 1 packet (17 g total) by mouth daily as needed (Constipation). 20 packet 0   ? predniSONE (DELTASONE) 5 MG tablet Take 5 mg by mouth daily.     ? rosuvastatin (CRESTOR) 20 MG tablet TAKE 1 TABLET BY MOUTH EVERY DAY 90 tablet 0     No current facility-administered medications for this visit.     Allergies   Allergen Reactions   ? Penicillins Hives and Shortness Of Breath   ? Cephalexin Unknown   ? Sulfa (Sulfonamide Antibiotics) Unknown   ? Tetracycline Unknown         Lab Results    Chemistry/lipid CBC Cardiac Enzymes/BNP/TSH/INR   Lab Results   Component Value Date     CHOL 133 05/29/2020    HDL 49 05/29/2020    LDLCALC 59 05/29/2020    TRIG 126 05/29/2020    CREATININE 0.71 06/26/2020    BUN 10 06/26/2020    K 3.6 06/26/2020     06/26/2020     06/26/2020    CO2 24 06/26/2020    Lab Results   Component Value Date    WBC 6.9 06/26/2020    HGB 11.9 (L) 06/26/2020    HCT 39.4 (L) 06/26/2020    MCV 95 06/26/2020     (H) 06/26/2020    Lab Results   Component Value Date    TROPONINI 0.09 05/29/2020    INR 1.36 (H) 06/09/2020        Anali Lundberg

## 2021-06-29 NOTE — PROGRESS NOTES
Progress Notes by Joaquina Donald CNP at 6/30/2020 11:59 PM     Author: Joaquina Donald CNP Service: -- Author Type: Nurse Practitioner    Filed: 7/1/2020  9:47 AM Encounter Date: 6/30/2020 Status: Attested    : Joaquina Donald CNP (Nurse Practitioner) Cosigner: Geronimo Strickland MD at 7/7/2020  4:19 PM    Attestation signed by Geronimo Strickland MD at 7/7/2020  4:19 PM    [facility]    Facility:   [facility] [[id number]]   Code Status: FULL CODE  PCP: Aston Perry MD   Phone: [phone]   Fax: [phone]      CHIEF COMPLAINT/REASON FOR VISIT:  Chief Complaint   Patient presents with   ? Discharge Summary       HISTORY COURSE:  Jerad is a 60 y.o. male undergoing physical and occupational therapy at [facility]. He is with past medical history of  end-stage kidney disease status post bilateral kidney transplant, and known coronary artery disease. He was hospitalized 6/2-6/13 following a MI.  Patient underwent coronary angiogram and following the angiographic finding, patient was referred to CV surgery for evaluation for possible coronary revascularization.   Patient was admitted in the hospital for Plavix washout and on June 8, 2020, following inpatient optimization, patient was taken to the operative room where he underwent coronary artery bypass graft x3, with a full individual grafts; left internal mammary artery to the left anterior descending coronary artery, reverse saphenous vein graft to the lateral branch of right coronary artery, reverse saphenous vein graft to the posterior descending branch of right coronary artery, greater saphenous vein procurement from the left lower extremity using endoscopic vein harvest technique, sternal closure ZACH topete.      Today he is seen for a face to face for discharge. He will discharge to home on 7/3/20 with current medications and treatments. He will have home care services  PT/OT/HHA and RN. He will also have outpatient cardiac therapy.  He continues to report intermittent dizziness but did not have it during my visit. He feels it may be related to his lower BP's since surgery. Currently he is on Metoprolol tartrate 12.5 mg two times a day.  Last /69 with a pulse of 71. He will have a virtual visit today with the cardiac PA and will follow up in the clinic on 7/21/20.    His hemoglobin has come up to 12.7 WBC 10.9 and his potassium has increased from 3.2-4.1.  He denied chest pain or shortness of breath.  He reports he is urinating well.   He has no lower extremity edema.. He denied constipation or diarrhea he continues on sternal precautions.He tells me his pain is controlled with just tylenol. He feels he is making progress in therapy. His weights were reviewed and he is down 6 pounds in the last 15 days.     Review of Systems  Constitutional: Positive for fatigue. Negative for activity change, appetite change, chills and fever.   HENT: Negative for congestion and sore throat.    Eyes: Negative for visual disturbance.   Respiratory: Negative for cough, shortness of breath and wheezing.    Cardiovascular: Negative for chest pain and leg swelling.   Gastrointestinal: Negative for abdominal distention, abdominal pain, constipation, diarrhea and nausea.   Genitourinary: Negative for dysuria.   Musculoskeletal: Negative for arthralgias and myalgias.   Skin: Positive for wound. Negative for color change and rash.   Neurological: Negative for weakness and numbness.   Psychiatric/Behavioral: Negative for agitation, behavioral problems and sleep disturbance.   Vitals:    06/29/20 1809   BP: 108/69   Pulse: 71   Resp: 17   Temp: 98.5  F (36.9  C)   SpO2: 97%   Weight: 162 lb 4.8 oz (73.6 kg)       Physical Exam  Constitutional:       Appearance: He is well-developed.   HENT:      Head: Normocephalic.   Eyes:      Conjunctiva/sclera: Conjunctivae normal.   Neck:      Musculoskeletal:  Normal range of motion.   Cardiovascular:      Rate and Rhythm: Normal rate and regular rhythm.      Heart sounds: Normal heart sounds. No murmur.   Pulmonary:      Effort: No respiratory distress.      Breath sounds: No wheezing.   Abdominal:      General: Bowel sounds are normal. There is no distension.      Palpations: Abdomen is soft.      Tenderness: There is no abdominal tenderness.   Musculoskeletal: Normal range of motion.   Skin:     General: Skin is warm.      Comments: Midline chest incision with CT sites/C/D/I    dermabond incision with bruising left medial leg.    Neurological:      Mental Status: He is alert and oriented to person, place, and time.   Psychiatric:         Behavior: Behavior normal.      MEDICATION LIST:  Current Outpatient Medications   Medication Sig   ? acetaminophen (TYLENOL) 325 MG tablet Take 2 tablets (650 mg total) by mouth every 4 (four) hours as needed for pain or fever.   ? aspirin 81 MG EC tablet Take 81 mg by mouth daily.   ? calcium citrate-vitamin D (CITRACAL+D) 315-200 mg-unit per tablet Take 1 tablet by mouth daily.   ? clopidogrel (PLAVIX) 75 mg tablet Take 75 mg by mouth daily.   ? docusate sodium (COLACE) 100 MG capsule Take 1 capsule (100 mg total) by mouth daily.   ? entecavir (BARACLUDE) 0.5 MG tablet Take 0.5 mg by mouth daily.   ? FLUoxetine (PROZAC) 10 MG tablet Take 10 mg by mouth daily. Take with 1 tablet with the 40mg to make it 50mg daily.   ? FLUoxetine (PROZAC) 40 MG capsule Take 40 mg by mouth daily.   ? latanoprost (XALATAN) 0.005 % ophthalmic solution Administer 1 drop to both eyes at bedtime.   ? metoprolol tartrate (LOPRESSOR) 25 MG tablet Take 0.5 tablets (12.5 mg total) by mouth 2 (two) times a day.   ? mycophenolate (CELLCEPT) 250 mg capsule Take 750 mg by mouth 2 (two) times a day.   ? nitroglycerin (NITROSTAT) 0.4 MG SL tablet Place 1 tablet (0.4 mg total) under the tongue every 5 (five) minutes as needed for chest pain.   ? OMEGA-3 FATTY ACIDS  ORAL Take 1 capsule by mouth daily.   ? omeprazole (PRILOSEC OTC) 20 MG tablet Take 20 mg by mouth daily before breakfast.   ? ONE DAILY MULTIVITAMIN ORAL Take 1 tablet by mouth daily.   ? polyethylene glycol (MIRALAX) 17 gram packet Take 1 packet (17 g total) by mouth daily as needed (Constipation).   ? predniSONE (DELTASONE) 5 MG tablet Take 5 mg by mouth daily.   ? rosuvastatin (CRESTOR) 20 MG tablet Take 1 tablet (20 mg total) by mouth at bedtime.   ? traMADoL (ULTRAM) 50 mg tablet Take 1 tablet (50 mg total) by mouth every 6 (six) hours as needed for pain.   ? furosemide (LASIX) 40 MG tablet Take 1 tablet (40 mg total) by mouth 2 (two) times a day at 9am and 6pm for 5 days.       DISCHARGE DIAGNOSIS:    ICD-10-CM    1. Essential hypertension  I10    2. NSTEMI (non-ST elevated myocardial infarction) (H)  I21.4    3. S/P kidney transplant  Z94.0        MEDICAL EQUIPMENT NEEDS:  None     DISCHARGE PLAN/FACE TO FACE:  I certify that services are/were furnished while this patient was under the care of a physician and that a physician or an allowed non-physician practitioner (NPP), had a face-to-face encounter that meets the physician face-to-face encounter requirements. The encounter was in whole, or in part, related to the primary reason for home health. The patient is confined to his/her home and needs intermittent skilled nursing, physical therapy, speech-language pathology, or the continued need for occupational therapy. A plan of care has been established by a physician and is periodically reviewed by a physician.  Date of Face-to-Face Encounter: 6/30/20    I certify that, based on my findings, the following services are medically necessary home health services: PT/OT/HHA/RN/cardiac outpatient     My clinical findings support the need for the above skilled services because:PT/OT for  continued strength and endurance, HHA to assist  with ADl's, RN for medication management, VS and monitoring of chest wounds and  left leg wound.     This patient is homebound because:He is deconditioned and easily fatigued following triple bypass surgery.       The patient is, or has been, under my care and I have initiated the establishment of the plan of care. This patient will be followed by a physician who will periodically review the plan of care.    Schedule follow up visit with primary care provider within 7 days to reestablish care.    Electronically signed by: Joaquina Donald CNP

## 2021-06-30 NOTE — PROGRESS NOTES
"Progress Notes by Anali Lundberg MD at 12/23/2020  7:50 AM     Author: Anali Lundberg MD Service: -- Author Type: Physician    Filed: 12/23/2020  8:05 AM Encounter Date: 12/23/2020 Status: Signed    : Anali Lundberg MD (Physician)           The patient has been notified of following:     \"This video visit will be conducted via a call between you and your physician/provider. We have found that certain health care needs can be provided without the need for an in-person physical exam.  This service lets us provide the care you need with a video conversation.  If a prescription is necessary we can send it directly to your pharmacy.  If lab work is needed we can place an order for that and you can then stop by our lab to have the test done at a later time.      Patient has given verbal consent to a Video visit? Yes    HEART CARE VIDEO ENCOUNTER        The patient has chosen to have the visit conducted as a video visit, to reduce risk of exposure given the current status of Coronavirus in our community. This video visit is being conducted via a call between the patient and physician/provider. Health care needs are being provided without a physical exam.     Assessment/Recommendations   Assessment/Plan:  1. Coronary artery disease involving native coronary artery of native heart without angina pectoris -he was experiencing accelerated angina and underwent nuclear stress test showing a medium-sized area of moderate ischemia involving inferior and inferolateral walls.  This prompted coronary angiography which showed normal left main, mid LAD 70% stenosis followed by distal sequential 70%, 70%, 50% and 80% lesions. First diagonal had a 70% lesion followed by a total occlusion filling via collaterals. Third septal was totally occluded.  The circumflex had a mid 30% lesion with a second obtuse marginal artery 99% lesion as well as third obtuse marginal artery 90% lesion.  Right coronary artery had a mid 70% " with distal 90% lesion, with a PDA having sequential 70% and 90% lesions, the right posterior AV artery 40% lesion, and the third right posterior lateral branch 60% lesion.  This prompted coronary artery bypass grafting.   2. S/P CABG (coronary artery bypass graft)  -June 8, 2020 patient underwent coronary bypass grafting with a LIMA to the LAD, vein graft to the right PDA, and vein graft to the right posterior lateral.  Driving restrictions and lifting restrictions were released.  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    3. Pure hypercholesterolemia -cholesterol blood test from October 28 this year was 154 with LDL of 63 was is excellent continue rosuvastatin.   4. Essential hypertension -blood pressure at cardiac rehab was 118/80, no symptoms of orthostasis but given that it is doing so well we will plan to discontinue metoprolol a year out from bypass which would be June 2021.   5. End stage renal disease with bilateral renal transplant  -on 2 separate occasions and need to continue to watch blood pressure, most recent creatinine looks good..       Plan  1.  Continue aspirin and statin and metoprolol.  Follow Up Plan: 6 months  I have reviewed the note as documented.  This accurately captures the substance of my conversation with the patient.    Total time of video between patient and provider was 0757 minutes   Start Time: 0800  Stop Time: 13    Originating Location (pt. Location): Home    Distant Location (provider location):  [facility] HEART [facility]     Mode of Communication:  Video Conference via [email or web address]       History of Present Illness/Subjective    Jerad Ross is a 60 y.o. male who is being evaluated via a billable video visit and has consented to a video visit. Jerad Ross has a history of coronary artery disease following renal transplant and coronary artery bypass grafting.  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  Has more energy, no significant chest discomfort, palpitations, PND, orthopnea, syncope, dizziness.  There is some mild effort related shortness of breath.      I have reviewed and updated the patient's Past Medical History, Social History, Family History and Medication List.     Physical Examination performed via live video encounter Review of Systems   General Appearance:   no distress, normal body habitus, upright.   ENT/Mouth: membranes moist, no nasal discharge or bleeding gums.  Normal head shape, no evidence of injury or laceration.     EYES:  no scleral icterus, normal conjunctivae   Neck: no evidence of thyromegaly.  Supple   Chest/Lungs:   No audible wheezing equal chest wall expansion. Non labored breathing.  No cough.   Cardiovascular:   No evidence of elevated jugular venous pressure.  No evidence of pitting edema bilaterally    Abdomen:  no evidence of abdominal distention. No observe juandice.     Extremities: no cyanosis or clubbing noted.    Skin: no xanthelasma, normal skin collar. No evidence of facial lacerations.      Neurologic: Normal arm motion bilateral, no tremors.  No evidence of focal defect.       Psychiatric: alert and oriented x3, calm       []Hover for details  Review of systems done with patient over the phone. Positive for muscle weakness, joint pain. All other review of systems within normal limits. He was unable to get his vitals today                                         Medical History  Surgical History Family History Social History   Past Medical History:   Diagnosis Date   ? Acute midline low back pain without sciatica    ? AION (acute ischemic optic neuropathy)    ? Anemia in chronic renal disease    ? Avascular necrosis of bones of both hips (H)     s/p bilateral hip replacements   ? Basal cell carcinoma    ? Chronic hepatitis B (H)    ? Clostridium difficile colitis    ? Coronary artery disease involving native coronary artery of native heart  without angina pectoris 06/17/2014    Coronary angiogram 6/17/14: Severe distal 3-vessel disease involving small vessels, not amenable to PCI or CABG   ? Depression    ? Diverticulosis    ? Dyslipidemia    ? Elevated C-reactive protein (CRP)    ? FSGS (focal segmental glomerulosclerosis)    ? Gastric ulcer with hemorrhage 02/12/2012   ? GERD (gastroesophageal reflux disease)    ? Glaucoma    ? Hemorrhoids    ? HTN (hypertension)    ? Hyperlipidemia    ? Hypogonadism in male    ? Kidney transplanted     focal glomerulosclerosis    ? NSTEMI (non-ST elevated myocardial infarction) (H) 06/17/2014   ? JULIANE (obstructive sleep apnea)    ? Paracentral scotoma     LE   ? Secondary renal hyperparathyroidism (H)    ? Septic bursitis    ? Squamous cell carcinoma     Past Surgical History:   Procedure Laterality Date   ? APPENDECTOMY     ? CATARACT EXTRACTION EXTRACAPSULAR W/ INTRAOCULAR LENS IMPLANTATION Bilateral 4-20-10, 6-1-10   ? CATARACT EXTRACTION W/  INTRAOCULAR LENS IMPLANT Right 04/19/2000   ? CATARACT EXTRACTION W/  INTRAOCULAR LENS IMPLANT Left 06/01/2000   ? COLONOSCOPY  02/13/2012   ? CV CORONARY ANGIOGRAM N/A 6/2/2020    Procedure: Coronary Angiogram;  Surgeon: Alona Florentino MD;  Location: [facility] Lab;  Service: Cardiology   ? CV LEFT HEART CATHETERIZATION WO LEFT VETRICULOGRAM Left 6/2/2020    Procedure: Left Heart Catheterization Without Left Ventriculogram;  Surgeon: Alona Florentino MD;  Location: [facility];  Service: Cardiology   ? ESOPHAGOGASTRODUODENOSCOPY  02/13/2012   ? HERNIA REPAIR  1995    Lt inguinal   ? HIP SURGERY  1981    bilat MITZI, revised 2001, 2005   ? KIDNEY SURGERY  1978, 1993    transplant   ? kidney transplant      1978, 1993   ? KNEE SURGERY Bilateral 1983, 1987   ? MOHS SURGERY     ? 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, auxiliary spleen   ? SUBTOTAL COLECTOMY  1983     10 cm, diverticulitis    ? TONSILLECTOMY      Family History   Problem Relation Age of Onset   ? Other  Father         AI with valve repair   ? Hypertension Father    ? Kidney disease Father    ? Other Maternal Grandfather         cerebrovascular disease   ? Ovarian cancer Paternal Grandmother    ? Kidney disease Paternal Aunt       Social History     Socioeconomic History   ? Marital status:      Spouse name: Not on file   ? Number of children: Not on file   ? Years of education: Not on file   ? Highest education level: Not on file   Occupational History   ? Not on file   Social Needs   ? Financial resource strain: Not on file   ? Food insecurity     Worry: Not on file     Inability: Not on file   ? Transportation needs     Medical: Not on file     Non-medical: Not on file   Tobacco Use   ? Smoking status: Former Smoker     Packs/day: 1.00     Years: 9.00     Pack years: 9.00     Quit date: 1988     Years since quittin.0   ? Smokeless tobacco: Former User   Substance and Sexual Activity   ? Alcohol use: Yes     Frequency: Monthly or less   ? Drug use: Never   ? Sexual activity: Not on file   Lifestyle   ? Physical activity     Days per week: Not on file     Minutes per session: Not on file   ? Stress: Not on file   Relationships   ? Social connections     Talks on phone: Not on file     Gets together: Not on file     Attends Cheondoism service: Not on file     Active member of club or organization: Not on file     Attends meetings of clubs or organizations: Not on file     Relationship status: Not on file   ? Intimate partner violence     Fear of current or ex partner: Not on file     Emotionally abused: Not on file     Physically abused: Not on file     Forced sexual activity: Not on file   Other Topics Concern   ? Not on file   Social History Narrative   ? Not on file          Medications  Allergies   Current Outpatient Medications   Medication Sig Dispense Refill   ? acetaminophen (TYLENOL) 325 MG tablet Take 2 tablets (650 mg total) by mouth every 4 (four) hours as needed for pain or fever. 20  tablet 0   ? ADVAIR DISKUS 250-50 mcg/dose DISKUS INL 1 PUFF ITL Q 12 H     ? albuterol (PROAIR HFA;PROVENTIL HFA;VENTOLIN HFA) 90 mcg/actuation inhaler INL 2 PFS ITL Q 6 H PRF SOB OR DIFFICULT BREATHING OR WHZ     ? aspirin 81 MG EC tablet Take 81 mg by mouth daily.     ? budesonide (PULMICORT) 90 mcg/actuation inhaler Inhale 2 puffs.     ? calcium citrate-vitamin D (CITRACAL+D) 315-200 mg-unit per tablet Take 1 tablet by mouth daily.     ? entecavir (BARACLUDE) 0.5 MG tablet Take 0.5 mg by mouth daily.     ? FLUoxetine (PROZAC) 10 MG tablet Take 10 mg by mouth daily. Take with 1 tablet with the 40mg to make it 50mg daily.     ? FLUoxetine (PROZAC) 40 MG capsule Take 40 mg by mouth daily.     ? latanoprost (XALATAN) 0.005 % ophthalmic solution Administer 1 drop to both eyes at bedtime.     ? metoprolol tartrate (LOPRESSOR) 25 MG tablet Take 0.5 tablets (12.5 mg total) by mouth 2 (two) times a day. 90 tablet 0   ? mycophenolate (CELLCEPT) 250 mg capsule Take 750 mg by mouth 2 (two) times a day.     ? naltrexone (DEPADE) 50 mg tablet Take 50 mg by mouth.     ? nitroglycerin (NITROSTAT) 0.4 MG SL tablet Place 1 tablet (0.4 mg total) under the tongue every 5 (five) minutes as needed for chest pain. 30 tablet 0   ? OMEGA-3 FATTY ACIDS ORAL Take 1 capsule by mouth daily.     ? omeprazole (PRILOSEC OTC) 20 MG tablet Take 20 mg by mouth daily before breakfast.     ? ONE DAILY MULTIVITAMIN ORAL Take 1 tablet by mouth daily.     ? pantoprazole (PROTONIX) 40 MG tablet      ? polyethylene glycol (MIRALAX) 17 gram packet Take 1 packet (17 g total) by mouth daily as needed (Constipation). 20 packet 0   ? predniSONE (DELTASONE) 5 MG tablet Take 5 mg by mouth daily.     ? rosuvastatin (CRESTOR) 20 MG tablet Take 1 tablet (20 mg total) by mouth daily. 90 tablet 2     No current facility-administered medications for this visit.     Allergies   Allergen Reactions   ? Penicillins Hives and Shortness Of Breath   ? Cephalexin Unknown    ? Sulfa (Sulfonamide Antibiotics) Unknown   ? Tetracycline Unknown         Lab Results    Chemistry/lipid CBC Cardiac Enzymes/BNP/TSH/INR   Lab Results   Component Value Date    CHOL 133 05/29/2020    HDL 49 05/29/2020    LDLCALC 59 05/29/2020    TRIG 126 05/29/2020    CREATININE 0.71 06/26/2020    BUN 10 06/26/2020    K 3.6 06/26/2020     06/26/2020     06/26/2020    CO2 24 06/26/2020    Lab Results   Component Value Date    WBC 6.9 06/26/2020    HGB 11.9 (L) 06/26/2020    HCT 39.4 (L) 06/26/2020    MCV 95 06/26/2020     (H) 06/26/2020    Lab Results   Component Value Date    TROPONINI 0.09 05/29/2020    INR 1.36 (H) 06/09/2020        Anali Lundberg

## 2021-07-14 DIAGNOSIS — K21.9 GASTROESOPHAGEAL REFLUX DISEASE WITHOUT ESOPHAGITIS: ICD-10-CM

## 2021-07-15 DIAGNOSIS — E78.00 PURE HYPERCHOLESTEROLEMIA: Primary | ICD-10-CM

## 2021-07-15 RX ORDER — PANTOPRAZOLE SODIUM 40 MG/1
40 TABLET, DELAYED RELEASE ORAL DAILY
Qty: 90 TABLET | Refills: 2 | Status: SHIPPED | OUTPATIENT
Start: 2021-07-15 | End: 2022-04-05

## 2021-07-15 RX ORDER — ROSUVASTATIN CALCIUM 20 MG/1
20 TABLET, COATED ORAL DAILY
Qty: 90 TABLET | Refills: 0 | Status: SHIPPED | OUTPATIENT
Start: 2021-07-15 | End: 2021-10-12

## 2021-07-15 NOTE — TELEPHONE ENCOUNTER
Juan calling from pharmacy requesting this be sent today as the rest of patients medications are being sent out 7/16/21 and they like to keep them all on the same date. Please call with questions thank you.

## 2021-07-26 ENCOUNTER — VIRTUAL VISIT (OUTPATIENT)
Dept: PSYCHOLOGY | Facility: CLINIC | Age: 61
End: 2021-07-26
Payer: COMMERCIAL

## 2021-07-26 DIAGNOSIS — F91.8 CONDUCT DISORDER, UNDIFFERENTIATED TYPE: ICD-10-CM

## 2021-07-26 DIAGNOSIS — F33.0 MILD EPISODE OF RECURRENT MAJOR DEPRESSIVE DISORDER (H): Primary | ICD-10-CM

## 2021-07-26 PROCEDURE — 99207 PR NO CHARGE LOS: CPT | Performed by: PSYCHOLOGIST

## 2021-07-26 PROCEDURE — 90832 PSYTX W PT 30 MINUTES: CPT | Mod: 95 | Performed by: PSYCHOLOGIST

## 2021-07-27 NOTE — PROGRESS NOTES
[facility] -  Case Progress Note      Client Name: Jerad Ross  YOB: 1960  MRN:  [id number]  Treating Provider: Julita Wisdom LP  Type of Session: Individual  Present in Session: client  Number of Minutes: 53    Start time:10:15am  End time: 10:50am  Telemedicine Visit: The patient's condition can be safely assessed and treated via synchronous audio and visual telemedicine encounter.      Reason for Telemedicine Visit: Covid-19    Originating Site (Patient Location): Patient's home    Distant Site (Provider Location): Provider Remote Setting    Consent:  The patient/guardian has verbally consented to: the potential risks and benefits of telemedicine (video visit) versus in person care; bill my insurance or make self-payment for services provided; and responsibility for payment of non-covered services.     Mode of Communication:  Video Conference via  used Doxy    As the provider I attest to compliance with applicable laws and regulations related to telemedicine.        Date of Service:7/26/21   Therapist and patient/guardian/family reviewed the Treatment Plan and goals, agree the plan remains current, accurate, and there are no additions or changes.      Health Maintenance Summary - Mental Health Treatment Plan       Status Date      MENTAL HEALTH TX PLAN Next Due 1/22/2022      Done 2/22/2021 [facility] MENTAL HEALTH TX PLAN SCAN     Done 11/21/2019 [facility] MENTAL HEALTH TX PLAN SCAN        Current Symptoms/Status:  Mild depression anxiety, worry, some thoughts and urges to act out sexually, boundary violation, grief and loss, increase in urges more at bedtime, distressed regarding marital relationship, financial distress.    Progress Toward Treatment Goals:   Client reported progress with adjusting to new home.    Intervention: Modality and Description:  Provided support and feedback. Used CBT to process thoughts and urges, depression, and health issues.  Had difficulty connecting  with client by video so we ended up doing a phone session.  Client shared that they are now living in their house and they feel pretty good about it.  Client shared having a boundary violation and we explored the potential emotions and stressors associated with that boundary violation.  He shared that although he and his  have tried to contact his wife about the divorce process she has not responded.  He shared at this time he has decided to just wait and see what happens in the future.  We explored this choice in the possible emotions that come with it.  Discussed client has made a lot of progress and detaching from partner and needs to continue to except that he cannot change her or control the relationship.  Client shared that he has decided to discontinue therapy because he has met most of his goals and he can return in the future if needed.  Discussed and validated the progress the client has made and to continue with his 12-step support and program.    Response to Intervention:   Client reported gaining insight and validation.    Assignment:  Work on relationship history.    Interactive Complexity:  There are four specific communication difficulties that complicate the work of the primary psychiatric procedure.  Interactive complexity (+[id number]) may be reported when at least one of these difficulties is present.    Communication difficulties present during current the psychiatric procedure include:  1. None.    Diagnosis:  Encounter Diagnoses   Name Primary?     Mild episode of recurrent major depressive disorder (H) Yes     Conduct disorder, undifferentiated type          Plan / Need for Future Services:  Client is completing treatment at this time and will return in the future if needed.    Julita Wisdom Psyd,  LP

## 2021-08-02 ENCOUNTER — APPOINTMENT (OUTPATIENT)
Dept: CT IMAGING | Facility: CLINIC | Age: 61
End: 2021-08-02
Attending: EMERGENCY MEDICINE
Payer: COMMERCIAL

## 2021-08-02 ENCOUNTER — HOSPITAL ENCOUNTER (EMERGENCY)
Facility: CLINIC | Age: 61
Discharge: HOME | End: 2021-08-02
Attending: EMERGENCY MEDICINE | Admitting: EMERGENCY MEDICINE
Payer: COMMERCIAL

## 2021-08-02 ENCOUNTER — APPOINTMENT (OUTPATIENT)
Dept: GENERAL RADIOLOGY | Facility: CLINIC | Age: 61
End: 2021-08-02
Attending: EMERGENCY MEDICINE
Payer: COMMERCIAL

## 2021-08-02 VITALS
TEMPERATURE: 98 F | SYSTOLIC BLOOD PRESSURE: 120 MMHG | HEIGHT: 67 IN | BODY MASS INDEX: 25.11 KG/M2 | RESPIRATION RATE: 14 BRPM | WEIGHT: 160 LBS | OXYGEN SATURATION: 100 % | DIASTOLIC BLOOD PRESSURE: 82 MMHG | HEART RATE: 68 BPM

## 2021-08-02 DIAGNOSIS — R05.9 COUGH: ICD-10-CM

## 2021-08-02 LAB
ALBUMIN SERPL-MCNC: 3.2 G/DL (ref 3.4–5)
ALP SERPL-CCNC: 69 U/L (ref 40–150)
ALT SERPL W P-5'-P-CCNC: 27 U/L (ref 0–70)
ANION GAP SERPL CALCULATED.3IONS-SCNC: 5 MMOL/L (ref 3–14)
AST SERPL W P-5'-P-CCNC: 25 U/L (ref 0–45)
BASOPHILS # BLD AUTO: 0 10E3/UL (ref 0–0.2)
BASOPHILS NFR BLD AUTO: 0 %
BILIRUB SERPL-MCNC: 1 MG/DL (ref 0.2–1.3)
BUN SERPL-MCNC: 13 MG/DL (ref 7–30)
CALCIUM SERPL-MCNC: 9.4 MG/DL (ref 8.5–10.1)
CHLORIDE BLD-SCNC: 106 MMOL/L (ref 94–109)
CO2 SERPL-SCNC: 28 MMOL/L (ref 20–32)
CREAT SERPL-MCNC: 0.82 MG/DL (ref 0.66–1.25)
EOSINOPHIL # BLD AUTO: 0.2 10E3/UL (ref 0–0.7)
EOSINOPHIL NFR BLD AUTO: 2 %
ERYTHROCYTE [DISTWIDTH] IN BLOOD BY AUTOMATED COUNT: 15.6 % (ref 10–15)
GFR SERPL CREATININE-BSD FRML MDRD: >90 ML/MIN/1.73M2
GLUCOSE BLD-MCNC: 81 MG/DL (ref 70–99)
HCT VFR BLD AUTO: 45.6 % (ref 40–53)
HGB BLD-MCNC: 14.3 G/DL (ref 13.3–17.7)
HOLD SPECIMEN: NORMAL
IMM GRANULOCYTES # BLD: 0 10E3/UL
IMM GRANULOCYTES NFR BLD: 0 %
LYMPHOCYTES # BLD AUTO: 1.9 10E3/UL (ref 0.8–5.3)
LYMPHOCYTES NFR BLD AUTO: 20 %
MCH RBC QN AUTO: 27.9 PG (ref 26.5–33)
MCHC RBC AUTO-ENTMCNC: 31.4 G/DL (ref 31.5–36.5)
MCV RBC AUTO: 89 FL (ref 78–100)
MONOCYTES # BLD AUTO: 0.9 10E3/UL (ref 0–1.3)
MONOCYTES NFR BLD AUTO: 10 %
NEUTROPHILS # BLD AUTO: 6.5 10E3/UL (ref 1.6–8.3)
NEUTROPHILS NFR BLD AUTO: 68 %
NRBC # BLD AUTO: 0 10E3/UL
NRBC BLD AUTO-RTO: 0 /100
NT-PROBNP SERPL-MCNC: 309 PG/ML (ref 0–900)
PLATELET # BLD AUTO: 355 10E3/UL (ref 150–450)
POTASSIUM BLD-SCNC: 3.7 MMOL/L (ref 3.4–5.3)
PROT SERPL-MCNC: 7.2 G/DL (ref 6.8–8.8)
RBC # BLD AUTO: 5.13 10E6/UL (ref 4.4–5.9)
SARS-COV-2 RNA RESP QL NAA+PROBE: NEGATIVE
SODIUM SERPL-SCNC: 139 MMOL/L (ref 133–144)
TROPONIN I SERPL-MCNC: <0.015 UG/L (ref 0–0.04)
WBC # BLD AUTO: 9.5 10E3/UL (ref 4–11)

## 2021-08-02 PROCEDURE — 93005 ELECTROCARDIOGRAM TRACING: CPT | Performed by: EMERGENCY MEDICINE

## 2021-08-02 PROCEDURE — 83880 ASSAY OF NATRIURETIC PEPTIDE: CPT | Performed by: EMERGENCY MEDICINE

## 2021-08-02 PROCEDURE — 250N000011 HC RX IP 250 OP 636

## 2021-08-02 PROCEDURE — 36415 COLL VENOUS BLD VENIPUNCTURE: CPT | Performed by: EMERGENCY MEDICINE

## 2021-08-02 PROCEDURE — 71275 CT ANGIOGRAPHY CHEST: CPT

## 2021-08-02 PROCEDURE — 71046 X-RAY EXAM CHEST 2 VIEWS: CPT

## 2021-08-02 PROCEDURE — 93010 ELECTROCARDIOGRAM REPORT: CPT | Performed by: EMERGENCY MEDICINE

## 2021-08-02 PROCEDURE — 99285 EMERGENCY DEPT VISIT HI MDM: CPT | Mod: 25 | Performed by: EMERGENCY MEDICINE

## 2021-08-02 PROCEDURE — U0003 INFECTIOUS AGENT DETECTION BY NUCLEIC ACID (DNA OR RNA); SEVERE ACUTE RESPIRATORY SYNDROME CORONAVIRUS 2 (SARS-COV-2) (CORONAVIRUS DISEASE [COVID-19]), AMPLIFIED PROBE TECHNIQUE, MAKING USE OF HIGH THROUGHPUT TECHNOLOGIES AS DESCRIBED BY CMS-2020-01-R: HCPCS | Performed by: EMERGENCY MEDICINE

## 2021-08-02 PROCEDURE — 85025 COMPLETE CBC W/AUTO DIFF WBC: CPT | Performed by: EMERGENCY MEDICINE

## 2021-08-02 PROCEDURE — 84484 ASSAY OF TROPONIN QUANT: CPT | Performed by: EMERGENCY MEDICINE

## 2021-08-02 PROCEDURE — C9803 HOPD COVID-19 SPEC COLLECT: HCPCS | Performed by: EMERGENCY MEDICINE

## 2021-08-02 PROCEDURE — 71275 CT ANGIOGRAPHY CHEST: CPT | Mod: 26 | Performed by: RADIOLOGY

## 2021-08-02 PROCEDURE — 71046 X-RAY EXAM CHEST 2 VIEWS: CPT | Mod: 26 | Performed by: RADIOLOGY

## 2021-08-02 PROCEDURE — 82040 ASSAY OF SERUM ALBUMIN: CPT | Performed by: EMERGENCY MEDICINE

## 2021-08-02 RX ORDER — IOPAMIDOL 755 MG/ML
58 INJECTION, SOLUTION INTRAVASCULAR ONCE
Status: COMPLETED | OUTPATIENT
Start: 2021-08-02 | End: 2021-08-02

## 2021-08-02 RX ADMIN — IOPAMIDOL 58 ML: 755 INJECTION, SOLUTION INTRAVENOUS at 16:19

## 2021-08-02 ASSESSMENT — ENCOUNTER SYMPTOMS
SHORTNESS OF BREATH: 1
FEVER: 0
COUGH: 1

## 2021-08-02 ASSESSMENT — MIFFLIN-ST. JEOR: SCORE: 1499.39

## 2021-08-02 NOTE — DISCHARGE INSTRUCTIONS
Please make an appointment to follow up with your transplant team if not improving by later this week.    Return to the emergency department for any problems.

## 2021-08-02 NOTE — ED PROVIDER NOTES
"    [facility] ([facility])  8/02/21  History     Chief Complaint   Patient presents with     Cough     Shortness of Breath     The history is provided by the patient and medical records.     Jerad Ross is a 61 year old male with a past medical history significant for basal cell carcinoma, HTN s/p kidney transplant (immunosuppressed), coronary artery disease s/p CABG (6/2020) , NSTEMI, chronic hepatitis B who presents to the Emergency Department for evaluation of a 1.5-week history of shortness of breath.  Patient was moving things out of his garage, and notes \"kicking up a lot of dust\" while doing this.  He endorses shortness of breath and productive cough since then.  He does take multiple medications due to sensitivities including Advair and Pulmicort.  He has received his COVID vaccine. He endorses upper chest pain which resolved after taking nitro. He denies fever.    I have reviewed the Medications, Allergies, Past Medical and Surgical History, and Social History in the [employer] system.  PAST MEDICAL HISTORY:   Past Medical History:   Diagnosis Date     Acute midline low back pain without sciatica      AION (acute ischemic optic neuropathy)      AION (acute ischemic optic neuropathy)      Anemia in chronic renal disease      Anemia in chronic renal disease      Avascular necrosis of bones of both hips (H)     s/p bilateral hip replacements     Avascular necrosis of bones of both hips (H)     s/p bilateral hip replacements     Basal cell carcinoma      Basal cell carcinoma      Chronic hepatitis B (H)      Chronic hepatitis B (H)      Clostridium difficile colitis      Clostridium difficile colitis      Coronary artery disease involving native coronary artery of native heart without angina pectoris 6/17/2014    Coronary angiogram 6/17/14: Severe distal 3-vessel disease involving small vessels, not amenable to PCI or CABG.     Coronary artery disease involving native coronary artery of " native heart without angina pectoris 06/17/2014    Coronary angiogram 6/17/14: Severe distal 3-vessel disease involving small vessels, not amenable to PCI or CABG     CRP elevated      Depression      Depression      Diverticulosis      Diverticulosis      Dyslipidemia      Dyslipidemia      Elevated C-reactive protein (CRP)      FSGS (focal segmental glomerulosclerosis)      FSGS (focal segmental glomerulosclerosis)      Gastric ulcer with hemorrhage 2/12/12     Gastric ulcer with hemorrhage 02/12/2012     GERD (gastroesophageal reflux disease)      GERD (gastroesophageal reflux disease)      Glaucoma     OHTN     Glaucoma      Hemorrhoids      Hemorrhoids      HTN (hypertension)      Hyperlipidemia      Hypertension secondary to other renal disorders      Hypogonadism in male      Hypogonadism in male      Immunosuppressed status (H)      Kidney replaced by transplant     focal glomerulosclerosis      Kidney transplanted     focal glomerulosclerosis      NSTEMI (non-ST elevated myocardial infarction) (H) 6/17/2014     NSTEMI (non-ST elevated myocardial infarction) (H) 06/17/2014     JULIANE (obstructive sleep apnea)     Doesn't use CPAP     JULIANE (obstructive sleep apnea)      Paracentral scotoma     LE     Paracentral scotoma     LE     Secondary renal hyperparathyroidism (H)      Secondary renal hyperparathyroidism (H)      Septic bursitis      Squamous cell carcinoma      Squamous cell carcinoma        PAST SURGICAL HISTORY:   Past Surgical History:   Procedure Laterality Date     APPENDECTOMY       APPENDECTOMY       Cardiac Bypass surgery  06/08/2020    Bowman's      CATARACT EXTRACTION EXTRACAPSULAR W/ INTRAOCULAR LENS IMPLANTATION Bilateral 4-20-10, 6-1-10     CATARACT IOL, RT/LT  4/19/2000    RE     CATARACT IOL, RT/LT  6/1/2000    LE     COLECTOMY PARTIAL  1983     10 cm, diverticulitis      COLECTOMY SUBTOTAL  1983    10 cm, diverticulitis     COLONOSCOPY  2/13/2012    Procedure:COLONOSCOPY; Surgeon:PAULINA  SLOAN BRIONES; Location: GI     COLONOSCOPY N/A 1/22/2020    Procedure: Colonoscopy, With Polypectomy And Biopsy;  Surgeon: Aston Kiran MD;  Location:  GI     COLONOSCOPY  02/13/2012     CV CORONARY ANGIOGRAM N/A 6/2/2020    Procedure: Coronary Angiogram;  Surgeon: Alona Florentino MD;  Location: [facility] Cath Lab;  Service: Cardiology     CV LEFT HEART CATHETERIZATION WITHOUT LEFT VENTRICULOGRAM Left 6/2/2020    Procedure: Left Heart Catheterization Without Left Ventriculogram;  Surgeon: Alona Florentino MD;  Location: [facility] Cath Lab;  Service: Cardiology     ESOPHAGOSCOPY, GASTROSCOPY, DUODENOSCOPY (EGD), COMBINED  2/13/2012    Procedure:COMBINED ESOPHAGOSCOPY, GASTROSCOPY, DUODENOSCOPY (EGD); Surgeon:SLOAN GALLARDO; Location: GI     ESOPHAGOSCOPY, GASTROSCOPY, DUODENOSCOPY (EGD), COMBINED  02/13/2012     EXTRACAPSULAR CATARACT EXTRATION WITH INTRAOCULAR LENS IMPLANT  4-20-10, 6-1-10    Rt, Lt     HERNIA REPAIR  1995    Lt inguinal     HERNIA REPAIR  1995    Lt inguinal     HIP SURGERY      1981, bilat MITZI, revised 2001, 2005     HIP SURGERY  1981    bilat MITZI, revised 2001, 2005     KIDNEY SURGERY  1978 and 1993    transplant     KIDNEY SURGERY  1978, 1993    transplant     KNEE SURGERY  1983, 1987    bilat TKA     KNEE SURGERY Bilateral 1983, 1987     MOHS MICROGRAPHIC PROCEDURE       MOHS MICROGRAPHIC PROCEDURE       OTHER SURGICAL HISTORY      kidney [id number], 1993     PHACOEMULSIFICATION CLEAR CORNEA WITH STANDARD INTRAOCULAR LENS IMPLANT Right 04/19/2000     PHACOEMULSIFICATION CLEAR CORNEA WITH STANDARD INTRAOCULAR LENS IMPLANT Left 06/01/2000     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 Additional pertinent items: None    FAMILY HISTORY:   Family  History   Problem Relation Age of Onset     Cardiovascular Father         AI with valve repair     Hypertension Father      Kidney Disease Father      Cancer Paternal Grandmother         ovarian ca     Cerebrovascular Disease Paternal Grandfather      Cerebrovascular Disease Maternal Grandfather      Kidney Disease Paternal Aunt      Skin Cancer No family hx of      Glaucoma No family hx of      Macular Degeneration No family hx of      Amblyopia No family hx of      Melanoma No family hx of      Other - See Comments Father         AI with valve repair     Other - See Comments Maternal Grandfather         cerebrovascular disease     Ovarian Cancer Paternal Grandmother      Kidney Disease Paternal Aunt        SOCIAL HISTORY:   Social History     Tobacco Use     Smoking status: Former Smoker     Packs/day: 1.00     Years: 9.00     Pack years: 9.00     Quit date: 1988     Years since quittin.6     Smokeless tobacco: Former User   Substance Use Topics     Alcohol use: Yes     Alcohol/week: 0.0 standard drinks     Comment: rarely 1 drink per month     Social history was reviewed with the patient. Additional pertinent items: None      Discharge Medication List as of 2021  5:17 PM      CONTINUE these medications which have NOT CHANGED    Details   acetaminophen (TYLENOL) 325 MG tablet Take 1-2 tablets by mouth every 6 hours as needed., Historical      albuterol (PROAIR HFA) 108 (90 Base) MCG/ACT inhaler Inhale 2 puffs into the lungs every 6 hours as needed for shortness of breath / dyspnea or wheezing, Disp-1 Inhaler, R-2, E-PrescribePharmacy may dispense brand covered by insurance (Proair, or proventil or ventolin or generic albuterol inhaler)      aspirin 81 MG tablet Take 81 mg by mouth daily , Historical      budesonide (PULMICORT FLEXHALER) 90 MCG/ACT inhaler Inhale 2 puffs into the lungs 2 times daily, Disp-1 each, R-11, E-Prescribe      calcium citrate-vitamin D (CITRACAL) 315-200 MG-UNIT TABS Take 1  tablet by mouth daily., Historical      entecavir (BARACLUDE) 0.5 MG tablet Take 1 tablet (0.5 mg) by mouth daily, Disp-90 tablet, R-3, E-Prescribe      !! FLUoxetine (PROZAC) 10 MG capsule Take 1 capsule (10 mg) by mouth daily With 40mg daily for a total daily dose of 50mg, Disp-90 capsule, R-1, E-Prescribe      !! FLUoxetine (PROZAC) 40 MG capsule Take 1 capsule (40 mg) by mouth daily, Disp-90 capsule, R-1, E-Prescribe      fluticasone-salmeterol (ADVAIR) 250-50 MCG/DOSE inhaler Inhale 1 puff into the lungs every 12 hours, Disp-60 Inhaler, R-11, E-Prescribe      latanoprost (XALATAN) 0.005 % ophthalmic solution Place 1 drop into both eyes At Bedtime, Disp-2.5 mL,R-4, E-Prescribe      metoprolol tartrate (LOPRESSOR) 25 MG tablet TAKE 1/2 TABLET BY MOUTH TWICE DAILY, Historical      Multiple Vitamins-Iron (ONE DAILY MULTIVITAMIN/IRON) TABS Take 1 tablet by mouth daily, Historical      mycophenolate (GENERIC EQUIVALENT) 250 MG capsule Take 3 capsules (750 mg) by mouth 2 times daily, Disp-540 capsule, R-3, E-Prescribe      naltrexone (DEPADE/REVIA) 50 MG tablet Take 1 tablet (50 mg) by mouth daily, Disp-90 tablet, R-1, E-Prescribe      nitroGLYcerin (NITROSTAT) 0.4 MG sublingual tablet Place 0.4 mg under the tongue, Historical      Omega-3 Fatty Acids (OMEGA 3 PO) Take 1 capsule by mouth daily Dose unknown, Historical      pantoprazole (PROTONIX) 40 MG EC tablet Take 1 tablet (40 mg) by mouth daily, Disp-90 tablet, R-2, E-Prescribe      polyethylene glycol (MIRALAX) 17 g packet Take 17 g by mouth, Historical      predniSONE (DELTASONE) 5 MG tablet Take 1 tablet (5 mg) by mouth daily, Disp-90 tablet, R-3, E-Prescribe      rosuvastatin (CRESTOR) 20 MG tablet Take 1 tablet (20 mg) by mouth daily, Disp-90 tablet, R-0, E-Prescribe      testosterone (ANDROGEL/TESTIM) 50 MG/5GM (1%) topical gel Place 1 packet (50 mg of testosterone) onto the skin daily Apply to the clean, dry intact skin of the shoulders, upper arms or  "abdomen., Disp-60 packet, R-1, E-Prescribe1 packet = 5 gm gel = 50 mg testosterone       !! - Potential duplicate medications found. Please discuss with provider.             Allergies   Allergen Reactions     Penicillins Shortness Of Breath and Hives     Keflex [Cephalexin Hcl] Other (See Comments)     Pt could not recall reaction     Tetracycline Other (See Comments)     Patient could not recall reaction     Sulfa Drugs Rash        Review of Systems   Constitutional: Negative for fever.   Respiratory: Positive for cough and shortness of breath.    Cardiovascular: Positive for chest pain (upper).   All other systems reviewed and are negative.    A complete review of systems was performed with pertinent positives and negatives noted in the HPI, and all other systems negative.    Physical Exam   BP: 123/83  Pulse: 80  Temp: 98.2  F (36.8  C)  Resp: 16  Height: 170.2 cm (5' 7\")  Weight: 72.6 kg (160 lb)  SpO2: 92 %      Physical Exam  Vitals and nursing note reviewed.   Constitutional:       General: He is not in acute distress.     Appearance: He is well-developed. He is not ill-appearing, toxic-appearing or diaphoretic.   HENT:      Head: Normocephalic and atraumatic.      Mouth/Throat:      Lips: Pink.      Mouth: Mucous membranes are moist.      Pharynx: Oropharynx is clear. No oropharyngeal exudate.   Eyes:      General: Lids are normal. No scleral icterus.     Extraocular Movements: Extraocular movements intact.      Right eye: No nystagmus.      Left eye: No nystagmus.      Conjunctiva/sclera: Conjunctivae normal.      Pupils: Pupils are equal, round, and reactive to light.   Neck:      Thyroid: No thyromegaly.      Vascular: No JVD.      Trachea: No tracheal deviation.   Cardiovascular:      Rate and Rhythm: Normal rate and regular rhythm.      Pulses: Normal pulses.      Heart sounds: Normal heart sounds. No murmur heard.   No friction rub. No gallop.    Pulmonary:      Effort: Pulmonary effort is normal. No " respiratory distress.      Breath sounds: Normal breath sounds.   Abdominal:      General: Bowel sounds are normal. There is no distension.      Palpations: Abdomen is soft. There is no mass.      Tenderness: There is no abdominal tenderness. There is no guarding or rebound.   Musculoskeletal:         General: No tenderness. Normal range of motion.      Cervical back: Normal range of motion and neck supple. No erythema or rigidity.      Right lower leg: No edema.      Left lower leg: No edema.   Lymphadenopathy:      Cervical: No cervical adenopathy.   Skin:     General: Skin is warm and dry.      Capillary Refill: Capillary refill takes less than 2 seconds.      Coloration: Skin is not pale.      Findings: No erythema or rash.   Neurological:      Mental Status: He is alert and oriented to person, place, and time.      Cranial Nerves: No cranial nerve deficit.      Sensory: No sensory deficit.      Motor: Motor function is intact.   Psychiatric:         Mood and Affect: Mood and affect normal.         Speech: Speech normal.         Behavior: Behavior normal.         ED Course     At 12:59 PM the patient was seen and examined by Abisai Moralez MD in Room [facility].        Procedures            EKG Interpretation:      Interpreted by Abisai Moralez MD    Symptoms at time of EKG: cough   Rhythm: Sinus rhythm with sinus arrhythmia  Rate: 65  Ectopy: none  Conduction: normal  ST Segments/ T Waves: No acute ischemic changes  Q Waves: inferior leads  Comparison to prior: Unchanged    Clinical Impression: Sinus arrhythmia, old inferior MI.  Sinus arrhythmia is new from old EKG.  EKG appears different than most recent EKG which appears to have been done while having triple bypass surgery.  EKG appears similar to prior EKG      Results for orders placed or performed during the hospital encounter of 08/02/21   XR Chest 2 Views     Status: None    Narrative    EXAMINATION:  XR CHEST 2 VW 8/2/2021 3:16  PM.    COMPARISON: 9/20/2019.    HISTORY:  cough, soa    FINDINGS: PA and lateral views of the chest. Surgical changes of the  chest with intact median sternotomy wires. Midline trachea. The  cardiomediastinal silhouette is within normal limits. Pulmonary  vasculature is distinct. No pleural effusion or pneumothorax. No focal  airspace opacity. 8 mm nodular opacity projecting over the anterior  right sixth rib, corresponds to bone island seen on 6/7/2018. The  visualized upper abdomen is within normal limits. Heterotopic  calcification projecting over the left shoulder/axilla similar to  comparison CT. Otherwise osseous structures are stable.      Impression    IMPRESSION:   No acute airspace disease.    I have personally reviewed the examination and initial interpretation  and I agree with the findings.    EDUARDO NARANJO MD         SYSTEM ID:  [id number]   CT Chest Pulmonary Embolism w Contrast     Status: None    Narrative    EXAMINATION: CTA pulmonary angiogram, 8/2/2021 4:50 PM     COMPARISON: Same day chest radiograph    HISTORY: Shortness of breath, evaluate for PE    TECHNIQUE: Volumetric helical acquisition of CT images of the chest  from the lung apices to the kidneys were acquired after the  administration of 58 mL of IV contrast. Non-Flash technique with  shallow inspiratory breath hold technique.  Post-processed multiplanar  and/or MIP reformations were obtained, archived to PACS and used in  interpretation of this study.     FINDINGS:  Contrast bolus is: adequate.  Exam is negative for acute  pulmonary embolism.       Old median sternotomy wire with intact wires. CABG. Normal heart size,  no pericardial effusion. Normal caliber thoracic aorta and main  pulmonary artery. Normal branching of the thoracic great vessels.  Normal thyroid. No suspicious lower cervical, axillary, or mediastinal  lymphadenopathy. Normal caliber esophagus.    Central tracheobronchial tree is widely patent. No pleural effusion  or  pneumothorax. No focal pulmonary opacity or consolidation. No  suspicious pulmonary mass or nodule. Mild posterior pleural  thickening.    Evaluation of the abdomen is limited on this nondedicated exam and  answers no acute pathology. Atrophic right kidney. Unchanged 5.5 x 5.3  cm left upper quadrant soft tissue mass. Additional 3.2 cm soft tissue  mass posterior to the stomach is also unchanged. These are compatible  with the patient's known post splenectomy with splenosis.    BONES: No acute or suspicious osseous abnormality.      Impression    IMPRESSION:   1. Exam is negative for acute pulmonary embolism. No acute pathology  noted in the chest.  2. Unchanged left upper abdominal quadrant splenosis dating back to at  least 4/17/2018.  3. Marked atrophy of native right kidney.    In the event of a positive result for acute pulmonary embolism  resulting in right heart strain, please activate the PERT  Multidisciplinary group for consultation by paging [phone]     PERT -- Pulmonary Embolism Response Team (Multidisciplinary team  including cardiology, interventional radiology, critical care,  hematology)    I have personally reviewed the examination and initial interpretation  and I agree with the findings.    ESMER KING MD         SYSTEM ID:  [id number]   Extra Blue Top Tube     Status: None   Result Value Ref Range    Hold Specimen JIC    Extra Red Top Tube     Status: None   Result Value Ref Range    Hold Specimen JIC    Extra Green Top (Lithium Heparin) Tube     Status: None   Result Value Ref Range    Hold Specimen JIC    Extra Purple Top Tube     Status: None   Result Value Ref Range    Hold Specimen JIC    Comprehensive metabolic panel     Status: Abnormal   Result Value Ref Range    Sodium 139 133 - 144 mmol/L    Potassium 3.7 3.4 - 5.3 mmol/L    Chloride 106 94 - 109 mmol/L    Carbon Dioxide (CO2) 28 20 - 32 mmol/L    Anion Gap 5 3 - 14 mmol/L    Urea Nitrogen 13 7 - 30 mg/dL     Creatinine 0.82 0.66 - 1.25 mg/dL    Calcium 9.4 8.5 - 10.1 mg/dL    Glucose 81 70 - 99 mg/dL    Alkaline Phosphatase 69 40 - 150 U/L    AST 25 0 - 45 U/L    ALT 27 0 - 70 U/L    Protein Total 7.2 6.8 - 8.8 g/dL    Albumin 3.2 (L) 3.4 - 5.0 g/dL    Bilirubin Total 1.0 0.2 - 1.3 mg/dL    GFR Estimate >90 >60 mL/min/1.73m2   Troponin I     Status: Normal   Result Value Ref Range    Troponin I <0.015 0.000 - 0.045 ug/L   Nt probnp inpatient (BNP)     Status: Normal   Result Value Ref Range    N terminal Pro BNP Inpatient 309 0 - 900 pg/mL   SARS-COV2 (COVID-19) Virus RT-PCR     Status: Normal    Specimen: Nasopharyngeal; Swab   Result Value Ref Range    SARS CoV2 PCR Negative Negative    Narrative    Testing was performed using the [employer]ert Xpress SARS-CoV-2 Assay on the  Cepheid Gene-Xpert Instrument Systems. Additional information about  this Emergency Use Authorization (EUA) assay can be found via the Lab  Guide. This test should be ordered for the detection of SARS-CoV-2 in  individuals who meet SARS-CoV-2 clinical and/or epidemiological  criteria. Test performance is unknown in asymptomatic patients. This  test is for in vitro diagnostic use under the FDA EUA for  laboratories certified under CLIA to perform high complexity testing.  This test has not been FDA cleared or approved. A negative result  does not rule out the presence of PCR inhibitors in the specimen or  target RNA in concentration below the limit of detection for the  assay. The possibility of a false negative should be considered if  the patient's recent exposure or clinical presentation suggests  COVID-19. This test was validated by the [facility]. This laboratory is certified under  the Clinical Laboratory Improvement Amendments of 1988 (CLIA-88) as  qualified to perform high complexity laboratory testing.     CBC with platelets and differential     Status: Abnormal   Result Value Ref Range    WBC  Count 9.5 4.0 - 11.0 10e3/uL    RBC Count 5.13 4.40 - 5.90 10e6/uL    Hemoglobin 14.3 13.3 - 17.7 g/dL    Hematocrit 45.6 40.0 - 53.0 %    MCV 89 78 - 100 fL    MCH 27.9 26.5 - 33.0 pg    MCHC 31.4 (L) 31.5 - 36.5 g/dL    RDW 15.6 (H) 10.0 - 15.0 %    Platelet Count 355 150 - 450 10e3/uL    % Neutrophils 68 %    % Lymphocytes 20 %    % Monocytes 10 %    % Eosinophils 2 %    % Basophils 0 %    % Immature Granulocytes 0 %    NRBCs per 100 WBC 0 <1 /100    Absolute Neutrophils 6.5 1.6 - 8.3 10e3/uL    Absolute Lymphocytes 1.9 0.8 - 5.3 10e3/uL    Absolute Monocytes 0.9 0.0 - 1.3 10e3/uL    Absolute Eosinophils 0.2 0.0 - 0.7 10e3/uL    Absolute Basophils 0.0 0.0 - 0.2 10e3/uL    Absolute Immature Granulocytes 0.0 <=0.0 10e3/uL    Absolute NRBCs 0.0 10e3/uL   EKG 12-lead, tracing only     Status: None   Result Value Ref Range    Systolic Blood Pressure  mmHg    Diastolic Blood Pressure  mmHg    Ventricular Rate 65 BPM    Atrial Rate 65 BPM    GA Interval 164 ms    QRS Duration 86 ms     ms    QTc 453 ms    P Axis 54 degrees    R AXIS -19 degrees    T Axis 65 degrees    Interpretation ECG       Sinus rhythm with marked sinus arrhythmia  Inferior infarct , age undetermined  Abnormal ECG  Unconfirmed report - interpretation of this ECG is computer generated - see medical record for final interpretation    Confirmed by - EMERGENCY ROOM, PHYSICIAN ([id number]),  BRENDA ALAN ([id number]) on 8/3/2021 7:08:13 AM     Asymptomatic COVID-19 Virus (Coronavirus) by PCR *Canceled*     Status: None ()    Specimen: Swab    Narrative    The following orders were created for panel order Asymptomatic COVID-19 Virus (Coronavirus) by PCR.  Procedure                               Abnormality         Status                     ---------                               -----------         ------                       Please view results for these tests on the individual orders.   Asymptomatic COVID-19 Virus (Coronavirus) by PCR Nasopharyngeal      Status: Normal    Specimen: Nasopharyngeal; Swab    Narrative    The following orders were created for panel order Asymptomatic COVID-19 Virus (Coronavirus) by PCR Nasopharyngeal.  Procedure                               Abnormality         Status                     ---------                               -----------         ------                     SARS-COV2 (COVID-19) Vir...[[id number]]  Normal              Final result                 Please view results for these tests on the individual orders.   [place] Draw     Status: None    Narrative    The following orders were created for panel order [place] Draw.  Procedure                               Abnormality         Status                     ---------                               -----------         ------                     Extra Blue Top Tube[[id number]]                              Final result               Extra Red Top Tube[[id number]]                               Final result               Extra Green Top (Lithium...[[id number]]                      Final result               Extra Purple Top Tube[[id number]]                            Final result                 Please view results for these tests on the individual orders.   CBC with platelets differential     Status: Abnormal    Narrative    The following orders were created for panel order CBC with platelets differential.  Procedure                               Abnormality         Status                     ---------                               -----------         ------                     CBC with platelets and d...[[id number]]  Abnormal            Final result                 Please view results for these tests on the individual orders.         Medications   sodium chloride (PF) 0.9% PF flush 88 mL (88 mLs Intravenous Given 8/2/21 1620)   iopamidol (ISOVUE-370) solution 58 mL (58 mLs Intravenous Given 8/2/21 1619)             Assessments & Plan (with Medical Decision Making)   This patient  presented to the emergency department complaining of cough and shortness of breath.  He is a transplant patient and was concerned about possible infection.  Here in the emergency department he is afebrile, he is not hypoxic.  Covid test is negative and chest x-ray demonstrated no convincing evidence for pneumonia.  12-lead EKG demonstrated no acute ischemic changes and troponin is negative decreasing suspicion for myocardial ischemia, myocarditis or acute pericarditis.  No elevation of BNP to suggest heart failure.  CT scan of the chest was obtained and demonstrated no evidence to suggest pulmonary embolism and no evidence of atypical pneumonia was found either.  At this point I am comfortable discharging him.  His symptoms may be secondary to irritation of bronchospasm from the poor air quality or the dust that he been kicking up to while working at home when this all started.  He was discharged with instructions for care and follow-up in good condition.    I have reviewed the nursing notes.    I have reviewed the findings, diagnosis, plan and need for follow up with the patient.    This part of the medical record was transcribed by Marine Acevedo, Medical Scribe, from a dictation done by Rk Moralez MD.     Discharge Medication List as of 8/2/2021  5:17 PM          Final diagnoses:   Cough       IKrista am serving as a trained medical scribe to document services personally performed by Abisai Moralez MD, based on the provider's statements to me.      Abisai GUEVARA MD, was physically present and have reviewed and verified the accuracy of this note documented by Krista Carey.     Abisai Moralez MD  8/2/2021   [facility] EMERGENCY DEPARTMENT     Abisai Moralez MD  08/04/21 [id number]

## 2021-08-02 NOTE — ED TRIAGE NOTES
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 PT reports having a mildly productive cough.     Home Treatments/Remedies: Prescribed meds     Febrile / Afebrile? Afebrile     Duration of C/o:  1 week     Fernando Mckeon RN  August 2, 2021

## 2021-08-03 LAB
ATRIAL RATE - MUSE: 65 BPM
DIASTOLIC BLOOD PRESSURE - MUSE: NORMAL MMHG
INTERPRETATION ECG - MUSE: NORMAL
P AXIS - MUSE: 54 DEGREES
PR INTERVAL - MUSE: 164 MS
QRS DURATION - MUSE: 86 MS
QT - MUSE: 436 MS
QTC - MUSE: 453 MS
R AXIS - MUSE: -19 DEGREES
SYSTOLIC BLOOD PRESSURE - MUSE: NORMAL MMHG
T AXIS - MUSE: 65 DEGREES
VENTRICULAR RATE- MUSE: 65 BPM

## 2021-08-06 ENCOUNTER — VIRTUAL VISIT (OUTPATIENT)
Dept: PSYCHIATRY | Facility: CLINIC | Age: 61
End: 2021-08-06
Attending: NURSE PRACTITIONER
Payer: COMMERCIAL

## 2021-08-06 DIAGNOSIS — F33.41 RECURRENT MAJOR DEPRESSIVE DISORDER, IN PARTIAL REMISSION (H): ICD-10-CM

## 2021-08-06 PROCEDURE — 99441 PR PHYSICIAN TELEPHONE EVALUATION 5-10 MIN: CPT | Mod: 95 | Performed by: NURSE PRACTITIONER

## 2021-08-06 RX ORDER — FLUOXETINE 40 MG/1
40 CAPSULE ORAL DAILY
Qty: 90 CAPSULE | Refills: 0 | Status: SHIPPED | OUTPATIENT
Start: 2021-08-06 | End: 2021-12-03

## 2021-08-06 RX ORDER — FLUOXETINE 10 MG/1
10 CAPSULE ORAL DAILY
Qty: 90 CAPSULE | Refills: 0 | Status: SHIPPED | OUTPATIENT
Start: 2021-08-06 | End: 2021-12-03

## 2021-08-06 RX ORDER — NALTREXONE HYDROCHLORIDE 50 MG/1
50 TABLET, FILM COATED ORAL DAILY
Qty: 90 TABLET | Refills: 0 | Status: ON HOLD | OUTPATIENT
Start: 2021-08-06 | End: 2021-09-20
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
## 2021-08-16 ENCOUNTER — VIRTUAL VISIT (OUTPATIENT)
Dept: PSYCHOLOGY | Facility: CLINIC | Age: 61
End: 2021-08-16
Payer: COMMERCIAL

## 2021-08-16 DIAGNOSIS — F33.41 RECURRENT MAJOR DEPRESSIVE DISORDER, IN PARTIAL REMISSION (H): Primary | ICD-10-CM

## 2021-08-16 DIAGNOSIS — F91.8 CONDUCT DISORDER, UNDIFFERENTIATED TYPE: ICD-10-CM

## 2021-08-16 PROCEDURE — 90832 PSYTX W PT 30 MINUTES: CPT | Mod: 95 | Performed by: PSYCHOLOGIST

## 2021-08-16 PROCEDURE — 99207 PR NO CHARGE LOS: CPT | Performed by: PSYCHOLOGIST

## 2021-08-17 NOTE — PROGRESS NOTES
[facility] -  Case Progress Note      Client Name: Jerad Ross  YOB: 1960  MRN:  [id number]  Treating Provider: Julita Wisdom LP  Type of Session: Individual  Present in Session: client  Number of Minutes: 25    Start time:2:00 pmam  End time: 2:25am  Telemedicine Visit: The patient's condition can be safely assessed and treated via synchronous audio and visual telemedicine encounter.      Reason for Telemedicine Visit: Covid-19    Originating Site (Patient Location): Patient's home    Distant Site (Provider Location): Provider Remote Setting    Consent:  The patient/guardian has verbally consented to: the potential risks and benefits of telemedicine (video visit) versus in person care; bill my insurance or make self-payment for services provided; and responsibility for payment of non-covered services.     Mode of Communication:  Video Conference via  used Doxy    As the provider I attest to compliance with applicable laws and regulations related to telemedicine.        Date of Service:8/16/21   Therapist and patient/guardian/family reviewed the Treatment Plan and goals, agree the plan remains current, accurate, and there are no additions or changes.      Health Maintenance Summary - Mental Health Treatment Plan       Status Date      MENTAL HEALTH TX PLAN Next Due 1/22/2022      Done 2/22/2021 HIM MENTAL HEALTH TX PLAN SCAN     Done 11/21/2019 HIM MENTAL HEALTH TX PLAN SCAN        Current Symptoms/Status:  Mild depression anxiety, worry, some thoughts and urges to act out sexually, increase in boundary violation, grief and loss, increase in urges more at bedtime, distressed regarding marital relationship, financial distress.    Progress Toward Treatment Goals:   Client reported progress with identifying that he needs to continue individual therapy.    Intervention: Modality and Description:  Provided support and feedback. Used CBT to process thoughts and urges, depression,  and health issues.  Client shared that he schedule an appointment because he has been doing more sexual acting out behavior.  He realized that he is not ready to discontinue individual therapy and wanted to talk to me about referrals for therapy.  We also processed what is possibly triggering his compulsive sexual behavior right now and identified some familiar things.  We again discussed strategies for intervening on these urges.  We also processed how moving even if it is a positive can be a big stressful event.  Client is also been feeling ill recently which triggers his whole history of being sick.  We talked about how recently he has been feeling so much better and then when this happens it can really throw him off.      Response to Intervention:   Client reported gaining insight and validation.    Assignment:  Work on relationship history.    Interactive Complexity:  There are four specific communication difficulties that complicate the work of the primary psychiatric procedure.  Interactive complexity (+[id number]) may be reported when at least one of these difficulties is present.    Communication difficulties present during current the psychiatric procedure include:  1. None.    Diagnosis:  Encounter Diagnoses   Name Primary?     Recurrent major depressive disorder, in partial remission (H) Yes     Conduct disorder, undifferentiated type          Plan / Need for Future Services:  Client is completing treatment at this time and will return in the future if needed.    Julita Wisdom Psyd,  LP

## 2021-08-30 ENCOUNTER — VIRTUAL VISIT (OUTPATIENT)
Dept: PSYCHOLOGY | Facility: CLINIC | Age: 61
End: 2021-08-30
Payer: COMMERCIAL

## 2021-08-30 DIAGNOSIS — F91.8 CONDUCT DISORDER, UNDIFFERENTIATED TYPE: ICD-10-CM

## 2021-08-30 DIAGNOSIS — F33.0 MILD EPISODE OF RECURRENT MAJOR DEPRESSIVE DISORDER (H): Primary | ICD-10-CM

## 2021-08-30 PROCEDURE — 99207 PR NO CHARGE LOS: CPT | Performed by: PSYCHOLOGIST

## 2021-08-30 PROCEDURE — 90837 PSYTX W PT 60 MINUTES: CPT | Mod: 95 | Performed by: PSYCHOLOGIST

## 2021-09-02 ENCOUNTER — LAB (OUTPATIENT)
Dept: LAB | Facility: CLINIC | Age: 61
End: 2021-09-02

## 2021-09-02 ENCOUNTER — OFFICE VISIT (OUTPATIENT)
Dept: INTERNAL MEDICINE | Facility: CLINIC | Age: 61
End: 2021-09-02
Payer: COMMERCIAL

## 2021-09-02 VITALS
SYSTOLIC BLOOD PRESSURE: 112 MMHG | TEMPERATURE: 98.4 F | WEIGHT: 168.4 LBS | OXYGEN SATURATION: 98 % | HEIGHT: 67 IN | DIASTOLIC BLOOD PRESSURE: 75 MMHG | RESPIRATION RATE: 16 BRPM | HEART RATE: 60 BPM | BODY MASS INDEX: 26.43 KG/M2

## 2021-09-02 DIAGNOSIS — Z23 NEED FOR VACCINATION: ICD-10-CM

## 2021-09-02 DIAGNOSIS — Z95.1 S/P CABG (CORONARY ARTERY BYPASS GRAFT): ICD-10-CM

## 2021-09-02 DIAGNOSIS — H40.003 GLAUCOMA SUSPECT, BOTH EYES: Primary | ICD-10-CM

## 2021-09-02 DIAGNOSIS — E29.1 HYPOGONADISM IN MALE: ICD-10-CM

## 2021-09-02 DIAGNOSIS — D84.9 IMMUNOSUPPRESSION (H): ICD-10-CM

## 2021-09-02 DIAGNOSIS — R53.82 CHRONIC FATIGUE: ICD-10-CM

## 2021-09-02 DIAGNOSIS — I25.10 CORONARY ARTERY DISEASE INVOLVING NATIVE CORONARY ARTERY OF NATIVE HEART WITHOUT ANGINA PECTORIS: Primary | ICD-10-CM

## 2021-09-02 LAB
HOLD SPECIMEN: NORMAL
TSH SERPL DL<=0.005 MIU/L-ACNC: 0.68 MU/L (ref 0.4–4)

## 2021-09-02 PROCEDURE — 99214 OFFICE O/P EST MOD 30 MIN: CPT | Mod: GE | Performed by: INTERNAL MEDICINE

## 2021-09-02 PROCEDURE — 36415 COLL VENOUS BLD VENIPUNCTURE: CPT | Performed by: PATHOLOGY

## 2021-09-02 PROCEDURE — 84403 ASSAY OF TOTAL TESTOSTERONE: CPT | Performed by: STUDENT IN AN ORGANIZED HEALTH CARE EDUCATION/TRAINING PROGRAM

## 2021-09-02 PROCEDURE — 84443 ASSAY THYROID STIM HORMONE: CPT | Performed by: PATHOLOGY

## 2021-09-02 ASSESSMENT — MIFFLIN-ST. JEOR: SCORE: 1537.49

## 2021-09-02 ASSESSMENT — PAIN SCALES - GENERAL: PAINLEVEL: NO PAIN (0)

## 2021-09-02 NOTE — NURSING NOTE
Jerad Ross is a 61 year old male patient that presents today in clinic for the following:    Chief Complaint   Patient presents with     RECHECK     Emergency Department follow-up August 2, 2021. The patient reports that he was seen for a cough and respiratory infection. He reports that he feels like he is on dialysis. He notes that he is really fatigued. He hypothesizes that he has had iron deficiencies in the past and is wondering if it is that.      Fatigue     The patient's allergies and medications were reviewed as noted. A set of vitals were recorded as noted without incident. The patient does not have any other questions for the provider.    Miguel Zabala, EMT at 1:34 PM on 9/2/2021

## 2021-09-02 NOTE — PATIENT INSTRUCTIONS
We are now offering a third mRNA booster COVID-19 vaccine for patients with illnesses or treatments that result in them being moderately to severely immunocompromised. The approved conditions are the following :    1. Receiving active cancer treatment for tumors (i.e. lung, prostate, colon or other cancer) or cancers of the blood (such as leukemia, lymphoma);  2. Received an organ transplant and are currently taking medicine to suppress the immune system;  3. Received a stem cell, CAR-T-cell or hematopoietic stem cell transplant within the last 2 years OR received one of these and are currently taking immunosuppression therapy;  4. Have a moderate or severe primary immunodeficiency (such as DiGeorge syndrome, Wiskott-Carolynn syndrome, or other rare primary immunodeficiency diseases);  5. Have advanced or untreated HIV infection;  6. Taking medicines that suppress the immune system such as:  a. High-dose corticosteroids (i.e. ?20mg prednisone or equivalent per day);  b. Alkylating agents (like cyclophosphamide or cisplatin);  c. Antimetabolites (like methotrexate);  d. Transplant-related immunosuppressive drugs, cancer chemotherapeutic agents classified as severely immunosuppressive;  e. Tumor-necrosis (TNF) blockers;  f. Other biologic agents that are immunosuppressive or immunomodulatory like cellular or gene/immune therapy.    The booster is recommended to be the same one as the one's previously received unless unavailable. For example if you received two doses of the Moderna, you would be receiving a Moderna booster. If the vaccine brand needed is not available, an alternative will be given.    If you qualify for any of the conditions above please stop by the pharmacy at the [facility]. There is no appointment necessary, vaccinations are being offered from 8:00 AM through 5:00 PM Monday-Friday. Please be aware that you will need to remain in the building 15 minutes after injection for  observation.    To schedule a echocardiogram, please call cardiology scheduling at [phone].

## 2021-09-03 LAB — CMV DNA SPEC NAA+PROBE-ACNC: NOT DETECTED IU/ML

## 2021-09-03 NOTE — PROGRESS NOTES
"                     [facility]       SUBJECTIVE:  Jerad Ross is a 61 year old male with a PMHx of Focal segmental glomerulosclerosis s/p DDKT in 1993/1978, HTN, depression, JULIANE and hypogonadism who presents today for fatigue.    Patient reports that about a month ago, he had shortness of breath with coughing which ended him up to be evaluated in ED on 8/2. He had a normal blood tests inculding Trop and BNP, ECG showed NSR and CTPE was negative for any pulmonary abnormalities. He also was reported some fatigue around the same period of time. His cough then resolved, but his fatigue got worse and now he gets out of breath by mowing the lawn for few steps only which is a big changes for his exercising/exertion tolerance (used to walk for 2 blocks w/o limitation.   He denied any chest pain, orthopnea, PND or LLE. No heat or cold intolerance, no weight gain or loss. No cough or wheezing. No melena or hematochezia.   He does not think he is depressed and not feeling sad. Although, because of how tired he is, he has been sleeping more than his usual and is not able to concentrate. He is still enjoying doing his stuff, but has been limited due to his SOB.      Medications (no recent changes) and allergies reviewed by me today.     ROS:   Constitutional, neuro, ENT, endocrine, pulmonary, cardiac, gastrointestinal, genitourinary, musculoskeletal, integument and psychiatric systems are negative, except as otherwise noted.    OBJECTIVE:    /75 (BP Location: Right arm, Patient Position: Sitting, Cuff Size: Adult Regular)   Pulse 60   Temp 98.4  F (36.9  C) (Oral)   Resp 16   Ht 1.702 m (5' 7\")   Wt 76.4 kg (168 lb 6.4 oz)   SpO2 98%   BMI 26.38 kg/m     Wt Readings from Last 1 Encounters:   09/02/21 76.4 kg (168 lb 6.4 oz)     Physical Exam:  General: AAOx3, NAD  HEENT: Oral mucosa moist and non-erythematous, PERRLA, EOM intact  CV: RRR, normal S1S2, no murmur, clicks, rubs. No JVD  Resp: Clear to " auscultation bilaterally, no wheezes, rhonchi  Abd: Soft, non-tender, BS+, no masses appreciated  Extremities: Radial and pedal pulses intact and symmetric, no pedal edema  Neuro: No lateralizing symptoms or focal neurologic deficits       ASSESSMENT/PLAN:    Jerad was seen today for recheck, fatigue and covid concern.    Diagnoses and all orders for this visit:    # Chronic fatigue  # Dyspnea on exertion   -Hx of Coronary artery disease S/P CABG   -Hx of Kidney transplant on Immunosuppression   -Hx of Hypogonadism in male  Patient with CHAPPELL for 1 month duration which was started right after he presented to ED on 8/2 and thought to have an allergic bronchospasm reaction. Patient used to walk 2 blocks with no dyspnea, but now reporting mowing the lawn for few steps makes him very SOB. No reported exertional/on restchest pian. No orthopnea,PND or LLE making ACS/heart failure less likely, but with his hx of severe CAD and being due for a stress test, will get a TTE and lexiscan for evaluation for cardiac disease. He also has a hx of JULIANE currently not using CPAP and this could also be contributing for his fatigue.  Other Ddx, CMV infection can sometimes cause fatigue without other symptoms (transplant: D-/R-). He also had a low testosterone level on 3/2021 and currently on replacement so will check a testosterone level to check on appropriate replacement dosing.  -     CMV DNA quantification; Future  -     TSH with free T4 reflex; Future  -     Vitamin D Deficiency; (patient declined in core lab)  -     Echocardiogram Complete; Future  -     LexiScan stress test  -     Testosterone total; Future  -     SLEEP EVALUATION & MANAGEMENT REFERRAL - ADULT -; Future  -     CMV DNA quantification; Future    Need for vaccination  Patient want to get his 3rd Moderna dose today   -     COVID-19 mRNA vacc, Moderna, 100 MCG/0.5ML injection; Inject 0.5 mLs into the muscle       Pt should return to clinic for f/u with me in 1  jocelynn Estrada MD   PGY-3  Sep 2, 2021    Pt was seen and plan of care discussed with Dr. Romano

## 2021-09-03 NOTE — PROGRESS NOTES
[facility] -  Case Progress Note      Client Name: Jerad Ross  YOB: 1960  MRN:  [id number]  Treating Provider: Julita Wisdom LP  Type of Session: Individual  Present in Session: client  Number of Minutes: 25    Start time:2:00 pmam  End time: 2:25am  Telemedicine Visit: The patient's condition can be safely assessed and treated via synchronous audio and visual telemedicine encounter.      Reason for Telemedicine Visit: Covid-19    Originating Site (Patient Location): Patient's home    Distant Site (Provider Location): Provider Remote Setting    Consent:  The patient/guardian has verbally consented to: the potential risks and benefits of telemedicine (video visit) versus in person care; bill my insurance or make self-payment for services provided; and responsibility for payment of non-covered services.     Mode of Communication:  Video Conference via  used Doxy    As the provider I attest to compliance with applicable laws and regulations related to telemedicine.        Date of Service:8/30/21   Therapist and patient/guardian/family reviewed the Treatment Plan and goals, agree the plan remains current, accurate, and there are no additions or changes.      Health Maintenance Summary - Mental Health Treatment Plan       Status Date      MENTAL HEALTH TX PLAN Next Due 1/22/2022      Done 2/22/2021 [facility] MENTAL HEALTH TX PLAN SCAN     Done 11/21/2019 [facility] MENTAL HEALTH TX PLAN SCAN        Current Symptoms/Status:  Mild depression anxiety, worry, some thoughts and urges to act out sexually, increase in boundary violation, grief and loss, increase in urges more at bedtime, distressed regarding marital relationship, financial distress.    Progress Toward Treatment Goals:   Client reported progress with identifying that he needs to continue individual therapy.    Intervention: Modality and Description:  Provided support and feedback. Used CBT to process thoughts and urges, depression,  and health issues.  Client shared that he has had some feelings of depression and sadness about his health.  He shared that he is feeling fatigued.  We talked about this being a part of his life with chronic medical issues and he really needs to allow himself to rest when needed.  Also processed with client his need to let some people into his life to really know all what this is like for him and sharing this in this way is different than complaining.  We talked about how it is critical to his recovery and self-care.  Also processed how client has been able to make some good changes in his life since we have been working together.  We processed his strengths.  He does have referrals.  We said goodbye.    Response to Intervention:   Client reported gaining insight and validation.    Assignment:  Work on relationship history.    Interactive Complexity:  There are four specific communication difficulties that complicate the work of the primary psychiatric procedure.  Interactive complexity (+[id number]) may be reported when at least one of these difficulties is present.    Communication difficulties present during current the psychiatric procedure include:  1. None.    Diagnosis:  Encounter Diagnoses   Name Primary?     Mild episode of recurrent major depressive disorder (H) Yes     Conduct disorder, undifferentiated type          Plan / Need for Future Services:  None needed.    Julita Wisdom Psyd,  LP

## 2021-09-04 ENCOUNTER — HEALTH MAINTENANCE LETTER (OUTPATIENT)
Age: 61
End: 2021-09-04

## 2021-09-06 LAB — TESTOST SERPL-MCNC: 148 NG/DL (ref 240–950)

## 2021-09-08 ENCOUNTER — HOSPITAL ENCOUNTER (OUTPATIENT)
Dept: NUCLEAR MEDICINE | Facility: HOSPITAL | Age: 61
End: 2021-09-08
Attending: INTERNAL MEDICINE
Payer: COMMERCIAL

## 2021-09-08 ENCOUNTER — MYC MEDICAL ADVICE (OUTPATIENT)
Dept: INTERNAL MEDICINE | Facility: CLINIC | Age: 61
End: 2021-09-08

## 2021-09-08 ENCOUNTER — HOSPITAL ENCOUNTER (OUTPATIENT)
Dept: CARDIOLOGY | Facility: HOSPITAL | Age: 61
End: 2021-09-08
Attending: INTERNAL MEDICINE
Payer: COMMERCIAL

## 2021-09-08 DIAGNOSIS — I25.10 CORONARY ARTERY DISEASE INVOLVING NATIVE CORONARY ARTERY OF NATIVE HEART WITHOUT ANGINA PECTORIS: ICD-10-CM

## 2021-09-08 DIAGNOSIS — I25.10 CORONARY ARTERY DISEASE INVOLVING NATIVE CORONARY ARTERY OF NATIVE HEART WITHOUT ANGINA PECTORIS: Primary | ICD-10-CM

## 2021-09-08 LAB
CV STRESS CURRENT BP HE: NORMAL
CV STRESS CURRENT HR HE: 53
CV STRESS CURRENT HR HE: 53
CV STRESS CURRENT HR HE: 54
CV STRESS CURRENT HR HE: 57
CV STRESS CURRENT HR HE: 60
CV STRESS CURRENT HR HE: 61
CV STRESS CURRENT HR HE: 61
CV STRESS CURRENT HR HE: 65
CV STRESS CURRENT HR HE: 65
CV STRESS CURRENT HR HE: 66
CV STRESS CURRENT HR HE: 67
CV STRESS CURRENT HR HE: 68
CV STRESS CURRENT HR HE: 74
CV STRESS DEVIATION TIME HE: NORMAL
CV STRESS ECHO PERCENT HR HE: NORMAL
CV STRESS EXERCISE STAGE HE: NORMAL
CV STRESS FINAL RESTING BP HE: NORMAL
CV STRESS FINAL RESTING HR HE: 65
CV STRESS MAX HR HE: 74
CV STRESS MAX TREADMILL GRADE HE: 0
CV STRESS MAX TREADMILL SPEED HE: 0
CV STRESS PEAK DIA BP HE: NORMAL
CV STRESS PEAK SYS BP HE: NORMAL
CV STRESS PHASE HE: NORMAL
CV STRESS PROTOCOL HE: NORMAL
CV STRESS RESTING PT POSITION HE: NORMAL
CV STRESS ST DEVIATION AMOUNT HE: NORMAL
CV STRESS ST DEVIATION ELEVATION HE: NORMAL
CV STRESS ST EVELATION AMOUNT HE: NORMAL
CV STRESS TEST TYPE HE: NORMAL
CV STRESS TOTAL STAGE TIME MIN 1 HE: NORMAL
NUC STRESS EJECTION FRACTION: 67 %
RATE PRESSURE PRODUCT: 9250
STRESS ECHO BASELINE DIASTOLIC HE: 72
STRESS ECHO BASELINE HR: 57
STRESS ECHO BASELINE SYSTOLIC BP: 123
STRESS ECHO CALCULATED PERCENT HR: 46 %
STRESS ECHO LAST STRESS DIASTOLIC BP: 70
STRESS ECHO LAST STRESS HR: 65
STRESS ECHO LAST STRESS SYSTOLIC BP: 125
STRESS ECHO TARGET HR: 161

## 2021-09-08 PROCEDURE — A9500 TC99M SESTAMIBI: HCPCS | Performed by: INTERNAL MEDICINE

## 2021-09-08 PROCEDURE — 93017 CV STRESS TEST TRACING ONLY: CPT

## 2021-09-08 PROCEDURE — 343N000001 HC RX 343: Performed by: INTERNAL MEDICINE

## 2021-09-08 PROCEDURE — 93018 CV STRESS TEST I&R ONLY: CPT | Mod: MG | Performed by: INTERNAL MEDICINE

## 2021-09-08 PROCEDURE — 78452 HT MUSCLE IMAGE SPECT MULT: CPT | Mod: MG

## 2021-09-08 PROCEDURE — 78452 HT MUSCLE IMAGE SPECT MULT: CPT | Mod: 26 | Performed by: INTERNAL MEDICINE

## 2021-09-08 PROCEDURE — G1004 CDSM NDSC: HCPCS | Performed by: INTERNAL MEDICINE

## 2021-09-08 PROCEDURE — 250N000011 HC RX IP 250 OP 636: Performed by: INTERNAL MEDICINE

## 2021-09-08 PROCEDURE — 93016 CV STRESS TEST SUPVJ ONLY: CPT | Performed by: INTERNAL MEDICINE

## 2021-09-08 RX ORDER — AMINOPHYLLINE 25 MG/ML
50 INJECTION, SOLUTION INTRAVENOUS
Status: DISCONTINUED | OUTPATIENT
Start: 2021-09-08 | End: 2021-09-08 | Stop reason: HOSPADM

## 2021-09-08 RX ORDER — CAFFEINE 200 MG
200 TABLET ORAL
Status: DISCONTINUED | OUTPATIENT
Start: 2021-09-08 | End: 2021-09-08 | Stop reason: HOSPADM

## 2021-09-08 RX ORDER — ALBUTEROL SULFATE 0.83 MG/ML
2.5 SOLUTION RESPIRATORY (INHALATION)
Status: DISCONTINUED | OUTPATIENT
Start: 2021-09-08 | End: 2021-09-08 | Stop reason: HOSPADM

## 2021-09-08 RX ORDER — CAFFEINE CITRATE 20 MG/ML
60 SOLUTION INTRAVENOUS
Status: DISCONTINUED | OUTPATIENT
Start: 2021-09-08 | End: 2021-09-08 | Stop reason: HOSPADM

## 2021-09-08 RX ORDER — REGADENOSON 0.08 MG/ML
0.4 INJECTION, SOLUTION INTRAVENOUS ONCE
Status: COMPLETED | OUTPATIENT
Start: 2021-09-08 | End: 2021-09-08

## 2021-09-08 RX ADMIN — Medication 8.7 MCI.: at 12:57

## 2021-09-08 RX ADMIN — Medication 31 MILLICURIE: at 14:15

## 2021-09-08 RX ADMIN — REGADENOSON 0.4 MG: 0.08 INJECTION, SOLUTION INTRAVENOUS at 14:11

## 2021-09-09 ENCOUNTER — TELEPHONE (OUTPATIENT)
Dept: CARDIOLOGY | Facility: CLINIC | Age: 61
End: 2021-09-09

## 2021-09-09 DIAGNOSIS — Z94.0 KIDNEY REPLACED BY TRANSPLANT: Primary | ICD-10-CM

## 2021-09-09 DIAGNOSIS — R94.39 ABNORMAL CARDIOVASCULAR STRESS TEST: ICD-10-CM

## 2021-09-09 DIAGNOSIS — Z95.1 S/P CABG (CORONARY ARTERY BYPASS GRAFT): ICD-10-CM

## 2021-09-09 NOTE — TELEPHONE ENCOUNTER
"----- Message from Sascha Lundberg MD sent at 9/8/2021  4:25 PM CDT -----  I tried to call this 61-year-old gentleman with three-vessel disease and three-vessel bypass.Nuclear stress test does show ischemia medium size involving the inferior and inferolateral wall, possibly right coronary distribution as well as the anterolateral wall possible diagonal distribution.Right coronary was bypassed, diagonal was not.Given the 2 areas of abnormality as well as the feeling that this stress test is intermediate risk I would pursue invasive angiography and possible intervention if he is still having shortness of breath and chest tightness.I left you number for him to call back, if he would rather set up an appointment to discuss further with me we can do that as well.LF      ==Received a call back from Jerad. He verbalized understanding of abnormal stress test and is agreeable to repeat coronary angiogram with possible pci. Last OV with [facility] was virtual in July. He is agreeable to office visit with either [facility]BF or CY- whichever we can get done in a reasonable time frame. Pt has a hx of grafts- back in June 2020 and renal transplant x2. His kidney function is WNL. Will place case request and update [facility] that we spoke and he is agreeable and will see Skyler next week. -[facility] [place]        Dr. Lundberg,  I spoke with Jerad about his abnormal stress test- he is agreeable to cor poss pci. He hasn't felt overt shortness of breath and chest tightness but has had noticeable fatigue lately. He will need a pre-operative in-person visit within 30 days of procedure. We attempted to get him in with you but you are unfortunately booked out well into October. He will see Skyler next week for this. I know he has had renal transplant x2- kidney function WNL and EF is fine so he technically does not meet \"renal protocol\" but just running this by you in case you think I should order that.  Thanks!  Mal   "

## 2021-09-10 NOTE — TELEPHONE ENCOUNTER
Sascha Lundberg MD Caswell, Mallory J, RN  Caller: Unspecified (Yesterday, 11:32 AM)  I would run the renal profile just to be on the safe side.  LF         BMP ordered- to be drawn at appt with CY 9/13 for H&P. Writer will place case request- hx of x2 renal transplant and on antirejection medications and has grafts. Reason for cor poss pci is abnormal stress test. Will do phone teach since appt with CY will be at  clinic. -[facility] [place]         Roderick Holland!  Jerad will see you Monday for preprocedure H&P for cor poss pci. I will do a phone teach for him since the appt is at  and send him the info via [employer] [facility]! Just in a heads up.  Thanks,  Kilo

## 2021-09-12 PROBLEM — R94.39 ABNORMAL CARDIOVASCULAR STRESS TEST: Status: ACTIVE | Noted: 2021-09-12

## 2021-09-12 NOTE — H&P (VIEW-ONLY)
Assessment/Recommendations   History and physical for coronary angiogram with possible percutaneous coronary intervention    Assessment/Plan:  1. Coronary artery disease with status post non-STEMI and CABG x3 in June 2020 with LIMA to the LAD, vein graft to the right PDA and vein graft to the right posterior lateral: Recent concern with chest tightness.  Nuclear stress test suggested medium sized area of moderate ischemia involving mid to  basal inferior and inferolateral wall  Involving distal anterior anterolateral wall.  Also noted small area of infarct at the apex which is new.     Currently on ASA 81 daily and nitroglycerin PRN.  He has not taken any further nitro since last visit with Dr. Lundberg.    Discussed about the indication for coronary angiogram/possible PCI and the risks associated with angiogram including <0.1% of MI and CVA or death or any of the following to the degree that it could threaten her life: allergic reaction, arrhythmia, renal failure, hemorrhage, thrombosis and infection.  Risk associated with therapeutic intervention includes 2% or less risk of MI, less than 1% risk of CVA, emergency her surgery, death, less than 4% risk of vascular injury requiring surgical repair or blood transfusion with 20-30% risk of restenosis with a bare-metal stent and less than 10% risk of stenosis with a drug-eluting stent.     Recent lab work including BMP and CBC were unremarkable in August 2021             2.  Dyslipidemia: Jerad Ross is on high intensity statin therapy with rosuvastatin 20 mg daily. His most recent LDL is 63 in August 2020.  Most recent AST/ALT are stable in August 2021.    3.  Hypertension: His blood pressure is well controlled-on Metroprolol tartrate 12.5 mg twice a day    4. Obstructive Sleep Apnea: He has used CPAP in the past but did not tolerate.  He now lays on the side.    5.  History of kidney transplant: On low-dose prednisone.    Plan:  - BMP pending  - Continue ASA  and PRN nitroglycerin  - Avoid strenuous exercise or lifting heavy objects until further direction  - Please seek medical attention if persistent worsening chest pain/tightness/pressure, shortness of breath, lightheaded or dizziness  - Continue rosuvastatin  -Continue current hypertension regimen  - Continue CPAP use  - Covid test as recommended-pending  - Pre angio teach by RENETTA Boateng-pending     History of Present Illness/Subjective    Jerad Ross is a 61 year old years old  with a significant past medical history of hypertension, kidney transplant related, non-STEMI, coronary artery disease with status post CABG x3, dyslipidemia, obstructive sleep apnea on CPAP, glaucoma, chronic hepatitis B, GERD, and depression who is seen at [facility] for  is here for pre procedure history and physical examination for coronary angiogram.    Patient had a follow-up appointment with Dr. Lundbreg in July this year.  He was experiencing some tightness in his chest.Nuclear stress test suggested medium sized area of moderate ischemia involving mid to  basal inferior and inferolateral wall involving distal anterior anterolateral wall.  Also noted small area of infarct at the apex which is new.  Patient was recommended to have coronary angiogram with possible percutaneous coronary intervention.    Today, Jerad reports he does get chest tightness, fatigue and also feels lightheadedness and dizziness with exertion.   He denies shortness of breath, dyspnea on exertion, orthopnea, PND, palpitations, abdominal fullness/bloating and lower extremity edema.  He further denies fever, chills, N/V, diarrhea, vomiting, constipation, hematochezia, hematemesis, hemoptysis. He also denies malignant hyperthermia, stroke/TIA, bleeding or clotting disorders, COPD, anaphylaxis reaction to medications, IV contrast or sea food.  He has had reaction to contrast as a child but he has been tolerating recent contrast exposure.  He is  also on naltrexone prescribed by the psychiatric provider.  He is wondering if this will cause any drug interaction with upcoming procedure.  He will check with his psychiatric provider.    Lexiscan Stress test done on 9/8/21 - Reviewed  Result Text      The nuclear stress test is abnormal.     Lexiscan stress ECG negative for ischemia     Nuclear images demonstrate a medium size area of moderate ischemia involving the mid to basal inferior and inferolateral wall. There also appears to be a small area of mild ischemia involving the distal anterior and anterolateral wall. Small area of infarct at the apex also noted.     The left ventricular ejection fraction at stress is 67% with slight apical hypokinesis.     The patient is at an intermediate risk of future cardiac ischemic events.     A prior study was conducted on 5/29/2020. Small area of distal anterior and anterolateral ischemia appears new.     Physical Examination Review of Systems   /62 (BP Location: Left arm, Patient Position: Sitting, Cuff Size: Adult Regular)   Pulse 68   Resp 16   Wt 75 kg (165 lb 6.4 oz)   BMI 25.91 kg/m    Body mass index is 25.91 kg/m .  Wt Readings from Last 3 Encounters:   09/13/21 75 kg (165 lb 6.4 oz)   09/02/21 76.4 kg (168 lb 6.4 oz)   08/02/21 72.6 kg (160 lb)     General Appearance:   no distress, normal body habitus   ENT/Mouth: membranes moist, no oral lesions or bleeding gums.      EYES:  no scleral icterus, normal conjunctivae   Neck: no carotid bruits or thyromegaly   Chest/Lungs:   lungs are clear to auscultation, no rales or wheezing, equal chest wall expansion    Cardiovascular:    Heart rate regular. Normal first and second heart sounds with no murmurs, rubs, or gallops; the carotid, radial and posterior tibial pulses are intact, Jugular venous pressure flat, no edema bilaterally    Abdomen:  no organomegaly, masses, bruits, or tenderness; bowel sounds are present   Extremities   no cyanosis or clubbing.   Radial pulses and Pedal pulses intact and symmetrical.  CMS intact.   Skin: no xanthelasma, warm.    Neurologic: normal  bilateral, no tremors     Psychiatric: alert and oriented x3, calm              Negative unless noted in HPI     Medical History  Surgical History Family History Social History   Past Medical History:   Diagnosis Date     Acute midline low back pain without sciatica      AION (acute ischemic optic neuropathy)      AION (acute ischemic optic neuropathy)      Anemia in chronic renal disease      Anemia in chronic renal disease      Avascular necrosis of bones of both hips (H)     s/p bilateral hip replacements     Avascular necrosis of bones of both hips (H)     s/p bilateral hip replacements     Basal cell carcinoma      Basal cell carcinoma      Chronic hepatitis B (H)      Chronic hepatitis B (H)      Clostridium difficile colitis      Clostridium difficile colitis      Coronary artery disease involving native coronary artery of native heart without angina pectoris 6/17/2014    Coronary angiogram 6/17/14: Severe distal 3-vessel disease involving small vessels, not amenable to PCI or CABG.     Coronary artery disease involving native coronary artery of native heart without angina pectoris 06/17/2014    Coronary angiogram 6/17/14: Severe distal 3-vessel disease involving small vessels, not amenable to PCI or CABG     CRP elevated      Depression      Depression      Diverticulosis      Diverticulosis      Dyslipidemia      Dyslipidemia      Elevated C-reactive protein (CRP)      FSGS (focal segmental glomerulosclerosis)      FSGS (focal segmental glomerulosclerosis)      Gastric ulcer with hemorrhage 2/12/12     Gastric ulcer with hemorrhage 02/12/2012     GERD (gastroesophageal reflux disease)      GERD (gastroesophageal reflux disease)      Glaucoma     OHTN     Glaucoma      Hemorrhoids      Hemorrhoids      HTN (hypertension)      Hyperlipidemia      Hypertension secondary to other renal  disorders      Hypogonadism in male      Hypogonadism in male      Immunosuppressed status (H)      Kidney replaced by transplant     focal glomerulosclerosis      Kidney transplanted     focal glomerulosclerosis      NSTEMI (non-ST elevated myocardial infarction) (H) 6/17/2014     NSTEMI (non-ST elevated myocardial infarction) (H) 06/17/2014     JULIANE (obstructive sleep apnea)     Doesn't use CPAP     JULIANE (obstructive sleep apnea)      Paracentral scotoma     LE     Paracentral scotoma     LE     Secondary renal hyperparathyroidism (H)      Secondary renal hyperparathyroidism (H)      Septic bursitis      Squamous cell carcinoma      Squamous cell carcinoma     Past Surgical History:   Procedure Laterality Date     APPENDECTOMY       APPENDECTOMY       Cardiac Bypass surgery  06/08/2020    [facility]      CATARACT EXTRACTION EXTRACAPSULAR W/ INTRAOCULAR LENS IMPLANTATION Bilateral 4-20-10, 6-1-10     CATARACT IOL, RT/LT  4/19/2000    RE     CATARACT IOL, RT/LT  6/1/2000    LE     COLECTOMY PARTIAL  1983     10 cm, diverticulitis      COLECTOMY SUBTOTAL  1983    10 cm, diverticulitis     COLONOSCOPY  2/13/2012    Procedure:COLONOSCOPY; Surgeon:SLOAN GALLARDO; Location: GI     COLONOSCOPY N/A 1/22/2020    Procedure: Colonoscopy, With Polypectomy And Biopsy;  Surgeon: Aston Kiran MD;  Location:  GI     COLONOSCOPY  02/13/2012     CV CORONARY ANGIOGRAM N/A 6/2/2020    Procedure: Coronary Angiogram;  Surgeon: Alona Florentino MD;  Location: [facility] Cath Lab;  Service: Cardiology     CV LEFT HEART CATHETERIZATION WITHOUT LEFT VENTRICULOGRAM Left 6/2/2020    Procedure: Left Heart Catheterization Without Left Ventriculogram;  Surgeon: Alona Florentino MD;  Location: [facility] Cath Lab;  Service: Cardiology     ESOPHAGOSCOPY, GASTROSCOPY, DUODENOSCOPY (EGD), COMBINED  2/13/2012    Procedure:COMBINED ESOPHAGOSCOPY, GASTROSCOPY, DUODENOSCOPY (EGD); Surgeon:SLOAN GALLARDO;  Location:[facility]     ESOPHAGOSCOPY, GASTROSCOPY, DUODENOSCOPY (EGD), COMBINED  02/13/2012     EXTRACAPSULAR CATARACT EXTRATION WITH INTRAOCULAR LENS IMPLANT  4-20-10, 6-1-10    Rt, Lt     HERNIA REPAIR  1995    Lt inguinal     HERNIA REPAIR  1995    Lt inguinal     HIP SURGERY      1981, bilat MITZI, revised 2001, 2005     HIP SURGERY  1981    bilat MITZI, revised 2001, 2005     KIDNEY SURGERY  1978 and 1993    transplant     KIDNEY SURGERY  1978, 1993    transplant     KNEE SURGERY  1983, 1987    bilat TKA     KNEE SURGERY Bilateral 1983, 1987     MOHS MICROGRAPHIC PROCEDURE       MOHS MICROGRAPHIC PROCEDURE       OTHER SURGICAL HISTORY      kidney [id number], 1993     PHACOEMULSIFICATION CLEAR CORNEA WITH STANDARD INTRAOCULAR LENS IMPLANT Right 04/19/2000     PHACOEMULSIFICATION CLEAR CORNEA WITH STANDARD INTRAOCULAR LENS IMPLANT Left 06/01/2000     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, auxiliary spleen     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, auxiliary spleen     TONSILLECTOMY       TONSILLECTOMY      Family History   Problem Relation Age of Onset     Cardiovascular Father         AI with valve repair     Hypertension Father      Kidney Disease Father      Cancer Paternal Grandmother         ovarian ca     Cerebrovascular Disease Paternal Grandfather      Cerebrovascular Disease Maternal Grandfather      Kidney Disease Paternal Aunt      Skin Cancer No family hx of      Glaucoma No family hx of      Macular Degeneration No family hx of      Amblyopia No family hx of      Melanoma No family hx of      Other - See Comments Father         AI with valve repair     Other - See Comments Maternal Grandfather         cerebrovascular disease     Ovarian Cancer Paternal Grandmother      Kidney Disease Paternal Aunt     Social History     Socioeconomic History     Marital status:      Spouse name: Not on file     Number of children: 0     Years of education: Not on file     Highest education level: Not on file   Occupational  History     Employer: DISABLED     Occupation:      Employer: [employer]   Tobacco Use     Smoking status: Former Smoker     Packs/day: 1.00     Years: 9.00     Pack years: 9.00     Quit date: 1988     Years since quittin.7     Smokeless tobacco: Former User   Substance and Sexual Activity     Alcohol use: Yes     Alcohol/week: 0.0 standard drinks     Comment: rarely 1 drink per month     Drug use: No     Sexual activity: Not on file   Other Topics Concern     Parent/sibling w/ CABG, MI or angioplasty before 65F 55M? Not Asked      Service Not Asked     Blood Transfusions Not Asked     Caffeine Concern Not Asked     Occupational Exposure Not Asked     Hobby Hazards Not Asked     Sleep Concern Not Asked     Stress Concern Not Asked     Weight Concern Not Asked     Special Diet No     Back Care Not Asked     Exercise Yes     Comment: walks     Bike Helmet Not Asked     Seat Belt Not Asked     Self-Exams Not Asked   Social History Narrative    . Wife is also a kidney transplant recipient.     Works part-time     Social Determinants of Health     Financial Resource Strain:      Difficulty of Paying Living Expenses:    Food Insecurity:      Worried About Running Out of Food in the Last Year:      Ran Out of Food in the Last Year:    Transportation Needs:      Lack of Transportation (Medical):      Lack of Transportation (Non-Medical):    Physical Activity:      Days of Exercise per Week:      Minutes of Exercise per Session:    Stress:      Feeling of Stress :    Social Connections:      Frequency of Communication with Friends and Family:      Frequency of Social Gatherings with Friends and Family:      Attends Christianity Services:      Active Member of Clubs or Organizations:      Attends Club or Organization Meetings:      Marital Status:    Intimate Partner Violence:      Fear of Current or Ex-Partner:      Emotionally Abused:      Physically Abused:      Sexually Abused:            Medications  Allergies   Current Outpatient Medications   Medication Sig Dispense Refill     acetaminophen (TYLENOL) 325 MG tablet Take 1-2 tablets by mouth every 6 hours as needed.       albuterol (PROAIR HFA) 108 (90 Base) MCG/ACT inhaler Inhale 2 puffs into the lungs every 6 hours as needed for shortness of breath / dyspnea or wheezing 1 Inhaler 2     aspirin 81 MG tablet Take 81 mg by mouth daily        budesonide (PULMICORT FLEXHALER) 90 MCG/ACT inhaler Inhale 2 puffs into the lungs 2 times daily 1 each 11     calcium citrate-vitamin D (CITRACAL) 315-200 MG-UNIT TABS Take 1 tablet by mouth daily.       entecavir (BARACLUDE) 0.5 MG tablet Take 1 tablet (0.5 mg) by mouth daily 90 tablet 3     FLUoxetine (PROZAC) 10 MG capsule Take 1 capsule (10 mg) by mouth daily With 40mg daily for a total daily dose of 50mg 90 capsule 0     FLUoxetine (PROZAC) 40 MG capsule Take 1 capsule (40 mg) by mouth daily 90 capsule 0     fluticasone-salmeterol (ADVAIR) 250-50 MCG/DOSE inhaler Inhale 1 puff into the lungs every 12 hours 60 Inhaler 11     latanoprost (XALATAN) 0.005 % ophthalmic solution Place 1 drop into both eyes At Bedtime 2.5 mL 4     metoprolol tartrate (LOPRESSOR) 25 MG tablet TAKE 1/2 TABLET BY MOUTH TWICE DAILY       Multiple Vitamins-Iron (ONE DAILY MULTIVITAMIN/IRON) TABS Take 1 tablet by mouth daily       mycophenolate (GENERIC EQUIVALENT) 250 MG capsule Take 3 capsules (750 mg) by mouth 2 times daily 540 capsule 3     naltrexone (DEPADE/REVIA) 50 MG tablet Take 1 tablet (50 mg) by mouth daily 90 tablet 0     Omega-3 Fatty Acids (OMEGA 3 PO) Take 1 capsule by mouth daily Dose unknown       pantoprazole (PROTONIX) 40 MG EC tablet Take 1 tablet (40 mg) by mouth daily 90 tablet 2     polyethylene glycol (MIRALAX) 17 g packet Take 17 g by mouth       predniSONE (DELTASONE) 5 MG tablet Take 1 tablet (5 mg) by mouth daily 90 tablet 3     rosuvastatin (CRESTOR) 20 MG tablet Take 1 tablet (20 mg) by mouth daily 90  tablet 0     testosterone (ANDROGEL/TESTIM) 50 MG/5GM (1%) topical gel Place 1 packet (50 mg of testosterone) onto the skin daily Apply to the clean, dry intact skin of the shoulders, upper arms or abdomen. 60 packet 1     nitroGLYcerin (NITROSTAT) 0.4 MG sublingual tablet Place 0.4 mg under the tongue (Patient not taking: Reported on 9/13/2021)      Allergies   Allergen Reactions     Penicillins Shortness Of Breath and Hives     Keflex [Cephalexin Hcl] Other (See Comments)     Pt could not recall reaction     Tetracycline Other (See Comments)     Patient could not recall reaction     Sulfa Drugs Rash         Lab Results    Chemistry/lipid CBC Cardiac Enzymes/BNP/TSH/INR   Lab Results   Component Value Date    CHOL 154 10/28/2020    HDL 65 10/28/2020    TRIG 127 10/28/2020    BUN 13 08/02/2021     08/02/2021    CO2 28 08/02/2021    Lab Results   Component Value Date    WBC 9.5 08/02/2021    HGB 14.3 08/02/2021    HCT 45.6 08/02/2021    MCV 89 08/02/2021     08/02/2021    Lab Results   Component Value Date    TROPONINI 0.09 05/29/2020    TSH 0.68 09/02/2021    INR 1.03 04/12/2021        42 minutes spent on the date of encounter doing chart review, review of test results, interpretation with above tests, patient visit, documentation and discussion with other provider.        This note has been dictated using voice recognition software. Any grammatical, typographical, or context distortions are unintentional and inherent to the software

## 2021-09-12 NOTE — PROGRESS NOTES
Assessment/Recommendations   History and physical for coronary angiogram with possible percutaneous coronary intervention    Assessment/Plan:  1. Coronary artery disease with status post non-STEMI and CABG x3 in June 2020 with LIMA to the LAD, vein graft to the right PDA and vein graft to the right posterior lateral: Recent concern with chest tightness.  Nuclear stress test suggested medium sized area of moderate ischemia involving mid to  basal inferior and inferolateral wall  Involving distal anterior anterolateral wall.  Also noted small area of infarct at the apex which is new.     Currently on ASA 81 daily and nitroglycerin PRN.  He has not taken any further nitro since last visit with Dr. Lundberg.    Discussed about the indication for coronary angiogram/possible PCI and the risks associated with angiogram including <0.1% of MI and CVA or death or any of the following to the degree that it could threaten her life: allergic reaction, arrhythmia, renal failure, hemorrhage, thrombosis and infection.  Risk associated with therapeutic intervention includes 2% or less risk of MI, less than 1% risk of CVA, emergency her surgery, death, less than 4% risk of vascular injury requiring surgical repair or blood transfusion with 20-30% risk of restenosis with a bare-metal stent and less than 10% risk of stenosis with a drug-eluting stent.     Recent lab work including BMP and CBC were unremarkable in August 2021             2.  Dyslipidemia: Jerad Ross is on high intensity statin therapy with rosuvastatin 20 mg daily. His most recent LDL is 63 in August 2020.  Most recent AST/ALT are stable in August 2021.    3.  Hypertension: His blood pressure is well controlled-on Metroprolol tartrate 12.5 mg twice a day    4. Obstructive Sleep Apnea: He has used CPAP in the past but did not tolerate.  He now lays on the side.    5.  History of kidney transplant: On low-dose prednisone.    Plan:  - BMP pending  - Continue ASA  and PRN nitroglycerin  - Avoid strenuous exercise or lifting heavy objects until further direction  - Please seek medical attention if persistent worsening chest pain/tightness/pressure, shortness of breath, lightheaded or dizziness  - Continue rosuvastatin  -Continue current hypertension regimen  - Continue CPAP use  - Covid test as recommended-pending  - Pre angio teach by RENETTA Boateng-pending     History of Present Illness/Subjective    Jerad Ross is a 61 year old years old  with a significant past medical history of hypertension, kidney transplant related, non-STEMI, coronary artery disease with status post CABG x3, dyslipidemia, obstructive sleep apnea on CPAP, glaucoma, chronic hepatitis B, GERD, and depression who is seen at [facility] for  is here for pre procedure history and physical examination for coronary angiogram.    Patient had a follow-up appointment with Dr. Lundberg in July this year.  He was experiencing some tightness in his chest.Nuclear stress test suggested medium sized area of moderate ischemia involving mid to  basal inferior and inferolateral wall involving distal anterior anterolateral wall.  Also noted small area of infarct at the apex which is new.  Patient was recommended to have coronary angiogram with possible percutaneous coronary intervention.    Today, Jerad reports he does get chest tightness, fatigue and also feels lightheadedness and dizziness with exertion.   He denies shortness of breath, dyspnea on exertion, orthopnea, PND, palpitations, abdominal fullness/bloating and lower extremity edema.  He further denies fever, chills, N/V, diarrhea, vomiting, constipation, hematochezia, hematemesis, hemoptysis. He also denies malignant hyperthermia, stroke/TIA, bleeding or clotting disorders, COPD, anaphylaxis reaction to medications, IV contrast or sea food.  He has had reaction to contrast as a child but he has been tolerating recent contrast exposure.  He is  also on naltrexone prescribed by the psychiatric provider.  He is wondering if this will cause any drug interaction with upcoming procedure.  He will check with his psychiatric provider.    Lexiscan Stress test done on 9/8/21 - Reviewed  Result Text      The nuclear stress test is abnormal.     Lexiscan stress ECG negative for ischemia     Nuclear images demonstrate a medium size area of moderate ischemia involving the mid to basal inferior and inferolateral wall. There also appears to be a small area of mild ischemia involving the distal anterior and anterolateral wall. Small area of infarct at the apex also noted.     The left ventricular ejection fraction at stress is 67% with slight apical hypokinesis.     The patient is at an intermediate risk of future cardiac ischemic events.     A prior study was conducted on 5/29/2020. Small area of distal anterior and anterolateral ischemia appears new.     Physical Examination Review of Systems   /62 (BP Location: Left arm, Patient Position: Sitting, Cuff Size: Adult Regular)   Pulse 68   Resp 16   Wt 75 kg (165 lb 6.4 oz)   BMI 25.91 kg/m    Body mass index is 25.91 kg/m .  Wt Readings from Last 3 Encounters:   09/13/21 75 kg (165 lb 6.4 oz)   09/02/21 76.4 kg (168 lb 6.4 oz)   08/02/21 72.6 kg (160 lb)     General Appearance:   no distress, normal body habitus   ENT/Mouth: membranes moist, no oral lesions or bleeding gums.      EYES:  no scleral icterus, normal conjunctivae   Neck: no carotid bruits or thyromegaly   Chest/Lungs:   lungs are clear to auscultation, no rales or wheezing, equal chest wall expansion    Cardiovascular:    Heart rate regular. Normal first and second heart sounds with no murmurs, rubs, or gallops; the carotid, radial and posterior tibial pulses are intact, Jugular venous pressure flat, no edema bilaterally    Abdomen:  no organomegaly, masses, bruits, or tenderness; bowel sounds are present   Extremities   no cyanosis or clubbing.   Radial pulses and Pedal pulses intact and symmetrical.  CMS intact.   Skin: no xanthelasma, warm.    Neurologic: normal  bilateral, no tremors     Psychiatric: alert and oriented x3, calm              Negative unless noted in HPI     Medical History  Surgical History Family History Social History   Past Medical History:   Diagnosis Date     Acute midline low back pain without sciatica      AION (acute ischemic optic neuropathy)      AION (acute ischemic optic neuropathy)      Anemia in chronic renal disease      Anemia in chronic renal disease      Avascular necrosis of bones of both hips (H)     s/p bilateral hip replacements     Avascular necrosis of bones of both hips (H)     s/p bilateral hip replacements     Basal cell carcinoma      Basal cell carcinoma      Chronic hepatitis B (H)      Chronic hepatitis B (H)      Clostridium difficile colitis      Clostridium difficile colitis      Coronary artery disease involving native coronary artery of native heart without angina pectoris 6/17/2014    Coronary angiogram 6/17/14: Severe distal 3-vessel disease involving small vessels, not amenable to PCI or CABG.     Coronary artery disease involving native coronary artery of native heart without angina pectoris 06/17/2014    Coronary angiogram 6/17/14: Severe distal 3-vessel disease involving small vessels, not amenable to PCI or CABG     CRP elevated      Depression      Depression      Diverticulosis      Diverticulosis      Dyslipidemia      Dyslipidemia      Elevated C-reactive protein (CRP)      FSGS (focal segmental glomerulosclerosis)      FSGS (focal segmental glomerulosclerosis)      Gastric ulcer with hemorrhage 2/12/12     Gastric ulcer with hemorrhage 02/12/2012     GERD (gastroesophageal reflux disease)      GERD (gastroesophageal reflux disease)      Glaucoma     OHTN     Glaucoma      Hemorrhoids      Hemorrhoids      HTN (hypertension)      Hyperlipidemia      Hypertension secondary to other renal  disorders      Hypogonadism in male      Hypogonadism in male      Immunosuppressed status (H)      Kidney replaced by transplant     focal glomerulosclerosis      Kidney transplanted     focal glomerulosclerosis      NSTEMI (non-ST elevated myocardial infarction) (H) 6/17/2014     NSTEMI (non-ST elevated myocardial infarction) (H) 06/17/2014     JULIANE (obstructive sleep apnea)     Doesn't use CPAP     JULIANE (obstructive sleep apnea)      Paracentral scotoma     LE     Paracentral scotoma     LE     Secondary renal hyperparathyroidism (H)      Secondary renal hyperparathyroidism (H)      Septic bursitis      Squamous cell carcinoma      Squamous cell carcinoma     Past Surgical History:   Procedure Laterality Date     APPENDECTOMY       APPENDECTOMY       Cardiac Bypass surgery  06/08/2020    [facility]      CATARACT EXTRACTION EXTRACAPSULAR W/ INTRAOCULAR LENS IMPLANTATION Bilateral 4-20-10, 6-1-10     CATARACT IOL, RT/LT  4/19/2000    RE     CATARACT IOL, RT/LT  6/1/2000    LE     COLECTOMY PARTIAL  1983     10 cm, diverticulitis      COLECTOMY SUBTOTAL  1983    10 cm, diverticulitis     COLONOSCOPY  2/13/2012    Procedure:COLONOSCOPY; Surgeon:SLOAN GALLARDO; Location: GI     COLONOSCOPY N/A 1/22/2020    Procedure: Colonoscopy, With Polypectomy And Biopsy;  Surgeon: Aston Kiran MD;  Location:  GI     COLONOSCOPY  02/13/2012     CV CORONARY ANGIOGRAM N/A 6/2/2020    Procedure: Coronary Angiogram;  Surgeon: Alona Florentino MD;  Location: [facility];  Service: Cardiology     CV LEFT HEART CATHETERIZATION WITHOUT LEFT VENTRICULOGRAM Left 6/2/2020    Procedure: Left Heart Catheterization Without Left Ventriculogram;  Surgeon: Alona Florentino MD;  Location: [facility];  Service: Cardiology     ESOPHAGOSCOPY, GASTROSCOPY, DUODENOSCOPY (EGD), COMBINED  2/13/2012    Procedure:COMBINED ESOPHAGOSCOPY, GASTROSCOPY, DUODENOSCOPY (EGD); Surgeon:SLOAN GALLARDO;  Location:[facility]     ESOPHAGOSCOPY, GASTROSCOPY, DUODENOSCOPY (EGD), COMBINED  02/13/2012     EXTRACAPSULAR CATARACT EXTRATION WITH INTRAOCULAR LENS IMPLANT  4-20-10, 6-1-10    Rt, Lt     HERNIA REPAIR  1995    Lt inguinal     HERNIA REPAIR  1995    Lt inguinal     HIP SURGERY      1981, bilat MITZI, revised 2001, 2005     HIP SURGERY  1981    bilat MITZI, revised 2001, 2005     KIDNEY SURGERY  1978 and 1993    transplant     KIDNEY SURGERY  1978, 1993    transplant     KNEE SURGERY  1983, 1987    bilat TKA     KNEE SURGERY Bilateral 1983, 1987     MOHS MICROGRAPHIC PROCEDURE       MOHS MICROGRAPHIC PROCEDURE       OTHER SURGICAL HISTORY      kidney [id number], 1993     PHACOEMULSIFICATION CLEAR CORNEA WITH STANDARD INTRAOCULAR LENS IMPLANT Right 04/19/2000     PHACOEMULSIFICATION CLEAR CORNEA WITH STANDARD INTRAOCULAR LENS IMPLANT Left 06/01/2000     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, auxiliary spleen     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, auxiliary spleen     TONSILLECTOMY       TONSILLECTOMY      Family History   Problem Relation Age of Onset     Cardiovascular Father         AI with valve repair     Hypertension Father      Kidney Disease Father      Cancer Paternal Grandmother         ovarian ca     Cerebrovascular Disease Paternal Grandfather      Cerebrovascular Disease Maternal Grandfather      Kidney Disease Paternal Aunt      Skin Cancer No family hx of      Glaucoma No family hx of      Macular Degeneration No family hx of      Amblyopia No family hx of      Melanoma No family hx of      Other - See Comments Father         AI with valve repair     Other - See Comments Maternal Grandfather         cerebrovascular disease     Ovarian Cancer Paternal Grandmother      Kidney Disease Paternal Aunt     Social History     Socioeconomic History     Marital status:      Spouse name: Not on file     Number of children: 0     Years of education: Not on file     Highest education level: Not on file   Occupational  History     Employer: DISABLED     Occupation:      Employer: [employer]   Tobacco Use     Smoking status: Former Smoker     Packs/day: 1.00     Years: 9.00     Pack years: 9.00     Quit date: 1988     Years since quittin.7     Smokeless tobacco: Former User   Substance and Sexual Activity     Alcohol use: Yes     Alcohol/week: 0.0 standard drinks     Comment: rarely 1 drink per month     Drug use: No     Sexual activity: Not on file   Other Topics Concern     Parent/sibling w/ CABG, MI or angioplasty before 65F 55M? Not Asked      Service Not Asked     Blood Transfusions Not Asked     Caffeine Concern Not Asked     Occupational Exposure Not Asked     Hobby Hazards Not Asked     Sleep Concern Not Asked     Stress Concern Not Asked     Weight Concern Not Asked     Special Diet No     Back Care Not Asked     Exercise Yes     Comment: walks     Bike Helmet Not Asked     Seat Belt Not Asked     Self-Exams Not Asked   Social History Narrative    . Wife is also a kidney transplant recipient.     Works part-time     Social Determinants of Health     Financial Resource Strain:      Difficulty of Paying Living Expenses:    Food Insecurity:      Worried About Running Out of Food in the Last Year:      Ran Out of Food in the Last Year:    Transportation Needs:      Lack of Transportation (Medical):      Lack of Transportation (Non-Medical):    Physical Activity:      Days of Exercise per Week:      Minutes of Exercise per Session:    Stress:      Feeling of Stress :    Social Connections:      Frequency of Communication with Friends and Family:      Frequency of Social Gatherings with Friends and Family:      Attends Oriental orthodox Services:      Active Member of Clubs or Organizations:      Attends Club or Organization Meetings:      Marital Status:    Intimate Partner Violence:      Fear of Current or Ex-Partner:      Emotionally Abused:      Physically Abused:      Sexually Abused:            Medications  Allergies   Current Outpatient Medications   Medication Sig Dispense Refill     acetaminophen (TYLENOL) 325 MG tablet Take 1-2 tablets by mouth every 6 hours as needed.       albuterol (PROAIR HFA) 108 (90 Base) MCG/ACT inhaler Inhale 2 puffs into the lungs every 6 hours as needed for shortness of breath / dyspnea or wheezing 1 Inhaler 2     aspirin 81 MG tablet Take 81 mg by mouth daily        budesonide (PULMICORT FLEXHALER) 90 MCG/ACT inhaler Inhale 2 puffs into the lungs 2 times daily 1 each 11     calcium citrate-vitamin D (CITRACAL) 315-200 MG-UNIT TABS Take 1 tablet by mouth daily.       entecavir (BARACLUDE) 0.5 MG tablet Take 1 tablet (0.5 mg) by mouth daily 90 tablet 3     FLUoxetine (PROZAC) 10 MG capsule Take 1 capsule (10 mg) by mouth daily With 40mg daily for a total daily dose of 50mg 90 capsule 0     FLUoxetine (PROZAC) 40 MG capsule Take 1 capsule (40 mg) by mouth daily 90 capsule 0     fluticasone-salmeterol (ADVAIR) 250-50 MCG/DOSE inhaler Inhale 1 puff into the lungs every 12 hours 60 Inhaler 11     latanoprost (XALATAN) 0.005 % ophthalmic solution Place 1 drop into both eyes At Bedtime 2.5 mL 4     metoprolol tartrate (LOPRESSOR) 25 MG tablet TAKE 1/2 TABLET BY MOUTH TWICE DAILY       Multiple Vitamins-Iron (ONE DAILY MULTIVITAMIN/IRON) TABS Take 1 tablet by mouth daily       mycophenolate (GENERIC EQUIVALENT) 250 MG capsule Take 3 capsules (750 mg) by mouth 2 times daily 540 capsule 3     naltrexone (DEPADE/REVIA) 50 MG tablet Take 1 tablet (50 mg) by mouth daily 90 tablet 0     Omega-3 Fatty Acids (OMEGA 3 PO) Take 1 capsule by mouth daily Dose unknown       pantoprazole (PROTONIX) 40 MG EC tablet Take 1 tablet (40 mg) by mouth daily 90 tablet 2     polyethylene glycol (MIRALAX) 17 g packet Take 17 g by mouth       predniSONE (DELTASONE) 5 MG tablet Take 1 tablet (5 mg) by mouth daily 90 tablet 3     rosuvastatin (CRESTOR) 20 MG tablet Take 1 tablet (20 mg) by mouth daily 90  tablet 0     testosterone (ANDROGEL/TESTIM) 50 MG/5GM (1%) topical gel Place 1 packet (50 mg of testosterone) onto the skin daily Apply to the clean, dry intact skin of the shoulders, upper arms or abdomen. 60 packet 1     nitroGLYcerin (NITROSTAT) 0.4 MG sublingual tablet Place 0.4 mg under the tongue (Patient not taking: Reported on 9/13/2021)      Allergies   Allergen Reactions     Penicillins Shortness Of Breath and Hives     Keflex [Cephalexin Hcl] Other (See Comments)     Pt could not recall reaction     Tetracycline Other (See Comments)     Patient could not recall reaction     Sulfa Drugs Rash         Lab Results    Chemistry/lipid CBC Cardiac Enzymes/BNP/TSH/INR   Lab Results   Component Value Date    CHOL 154 10/28/2020    HDL 65 10/28/2020    TRIG 127 10/28/2020    BUN 13 08/02/2021     08/02/2021    CO2 28 08/02/2021    Lab Results   Component Value Date    WBC 9.5 08/02/2021    HGB 14.3 08/02/2021    HCT 45.6 08/02/2021    MCV 89 08/02/2021     08/02/2021    Lab Results   Component Value Date    TROPONINI 0.09 05/29/2020    TSH 0.68 09/02/2021    INR 1.03 04/12/2021        42 minutes spent on the date of encounter doing chart review, review of test results, interpretation with above tests, patient visit, documentation and discussion with other provider.        This note has been dictated using voice recognition software. Any grammatical, typographical, or context distortions are unintentional and inherent to the software

## 2021-09-13 ENCOUNTER — TELEPHONE (OUTPATIENT)
Dept: PSYCHIATRY | Facility: CLINIC | Age: 61
End: 2021-09-13

## 2021-09-13 ENCOUNTER — OFFICE VISIT (OUTPATIENT)
Dept: CARDIOLOGY | Facility: CLINIC | Age: 61
End: 2021-09-13
Payer: MEDICARE

## 2021-09-13 VITALS
WEIGHT: 165.4 LBS | HEART RATE: 68 BPM | SYSTOLIC BLOOD PRESSURE: 118 MMHG | RESPIRATION RATE: 16 BRPM | BODY MASS INDEX: 25.91 KG/M2 | DIASTOLIC BLOOD PRESSURE: 62 MMHG

## 2021-09-13 DIAGNOSIS — Z94.0 KIDNEY REPLACED BY TRANSPLANT: ICD-10-CM

## 2021-09-13 DIAGNOSIS — E78.5 DYSLIPIDEMIA: ICD-10-CM

## 2021-09-13 DIAGNOSIS — Z94.0 HTN, KIDNEY TRANSPLANT RELATED: ICD-10-CM

## 2021-09-13 DIAGNOSIS — G47.33 OSA (OBSTRUCTIVE SLEEP APNEA): ICD-10-CM

## 2021-09-13 DIAGNOSIS — Z95.1 S/P CABG (CORONARY ARTERY BYPASS GRAFT): ICD-10-CM

## 2021-09-13 DIAGNOSIS — R07.89 CHEST TIGHTNESS: ICD-10-CM

## 2021-09-13 DIAGNOSIS — R94.39 ABNORMAL CARDIOVASCULAR STRESS TEST: ICD-10-CM

## 2021-09-13 DIAGNOSIS — I21.4 NSTEMI (NON-ST ELEVATED MYOCARDIAL INFARCTION) (H): ICD-10-CM

## 2021-09-13 DIAGNOSIS — I15.1 HTN, KIDNEY TRANSPLANT RELATED: ICD-10-CM

## 2021-09-13 DIAGNOSIS — I25.10 CORONARY ARTERY DISEASE INVOLVING NATIVE CORONARY ARTERY OF NATIVE HEART WITHOUT ANGINA PECTORIS: Primary | ICD-10-CM

## 2021-09-13 LAB
ANION GAP SERPL CALCULATED.3IONS-SCNC: 9 MMOL/L (ref 5–18)
BUN SERPL-MCNC: 13 MG/DL (ref 8–22)
CALCIUM SERPL-MCNC: 9.9 MG/DL (ref 8.5–10.5)
CHLORIDE BLD-SCNC: 101 MMOL/L (ref 98–107)
CO2 SERPL-SCNC: 28 MMOL/L (ref 22–31)
CREAT SERPL-MCNC: 0.74 MG/DL (ref 0.7–1.3)
GFR SERPL CREATININE-BSD FRML MDRD: >90 ML/MIN/1.73M2
GLUCOSE BLD-MCNC: 85 MG/DL (ref 70–125)
POTASSIUM BLD-SCNC: 4 MMOL/L (ref 3.5–5)
SODIUM SERPL-SCNC: 138 MMOL/L (ref 136–145)

## 2021-09-13 PROCEDURE — 80048 BASIC METABOLIC PNL TOTAL CA: CPT | Performed by: NURSE PRACTITIONER

## 2021-09-13 PROCEDURE — 36415 COLL VENOUS BLD VENIPUNCTURE: CPT | Performed by: NURSE PRACTITIONER

## 2021-09-13 PROCEDURE — 99215 OFFICE O/P EST HI 40 MIN: CPT | Performed by: NURSE PRACTITIONER

## 2021-09-13 NOTE — TELEPHONE ENCOUNTER
[facility] Call Center    Phone Message    May a detailed message be left on voicemail: yes     Reason for Call: Other: Pt is going to have a surgery and is recommended a medication that interacts with the naltrexone prescription. Pt would like to discuss getting off the naltrexone.      Action Taken: Other: [place] psych pool    Travel Screening: Not Applicable

## 2021-09-13 NOTE — TELEPHONE ENCOUNTER
Kaelyn,    Thanks for this. I can't see a date for surgery but think he needs 5-7 days for naltrexone to be out of his system completely before surgery. I think he could stop it without taper.    He takes for pornography addiction. Copied are the PharmDs so they can verify. I'm in [place] tomorrow, please text me if needed.     Genia

## 2021-09-13 NOTE — TELEPHONE ENCOUNTER
Writer called to get more information from patient.  Patient has not scheduled his surgery yet but surgeon has told him to anticipate that he will be placed on opioid pain control during and post surgery.  Patient looking for guidance on whether he can just stop the medication or whether a taper is recommended.    Routed to provider for feedback.

## 2021-09-13 NOTE — PATIENT INSTRUCTIONS
Jerad Ross,    It was a pleasure to see you today at the [facility].     My recommendations after this visit include:    - No medications changes made today    -Please check with your psychiatric provider if you need to hold your naltrexone prior to coronary angiogram    - Please seek medical attention if persistent worsening chest tightness, pain, shortness of breath, lightheaded or dizziness.    -Please make sure to sit or lay down if you have to take nitro to avoid fall from low blood pressure with lightheadedness and dizziness    - Avoid strenuous exercise    -Anticipate phone call from [facility] for preangiogram teaching    - Please call RENETTA Boateng on [phone]  if you have any questions or concerns    Skyler Koenig, CNP

## 2021-09-13 NOTE — LETTER
9/13/2021    Aston Perry MD  [address] [facility] [address]    RE: Jerad M Vandana       Dear Colleague,    I had the pleasure of seeing Jerad Ross in the [facility].            Assessment/Recommendations   History and physical for coronary angiogram with possible percutaneous coronary intervention    Assessment/Plan:  1. Coronary artery disease with status post non-STEMI and CABG x3 in June 2020 with LIMA to the LAD, vein graft to the right PDA and vein graft to the right posterior lateral: Recent concern with chest tightness.  Nuclear stress test suggested medium sized area of moderate ischemia involving mid to  basal inferior and inferolateral wall  Involving distal anterior anterolateral wall.  Also noted small area of infarct at the apex which is new.     Currently on ASA 81 daily and nitroglycerin PRN.  He has not taken any further nitro since last visit with Dr. Lundberg.    Discussed about the indication for coronary angiogram/possible PCI and the risks associated with angiogram including <0.1% of MI and CVA or death or any of the following to the degree that it could threaten her life: allergic reaction, arrhythmia, renal failure, hemorrhage, thrombosis and infection.  Risk associated with therapeutic intervention includes 2% or less risk of MI, less than 1% risk of CVA, emergency her surgery, death, less than 4% risk of vascular injury requiring surgical repair or blood transfusion with 20-30% risk of restenosis with a bare-metal stent and less than 10% risk of stenosis with a drug-eluting stent.     Recent lab work including BMP and CBC were unremarkable in August 2021             2.  Dyslipidemia: Jerad Ross is on high intensity statin therapy with rosuvastatin 20 mg daily. His most recent LDL is 63 in August 2020.  Most recent AST/ALT are stable in August 2021.    3.  Hypertension: His blood pressure is well controlled-on  Metroprolol tartrate 12.5 mg twice a day    4. Obstructive Sleep Apnea: He has used CPAP in the past but did not tolerate.  He now lays on the side.    5.  History of kidney transplant: On low-dose prednisone.    Plan:  - BMP pending  - Continue ASA and PRN nitroglycerin  - Avoid strenuous exercise or lifting heavy objects until further direction  - Please seek medical attention if persistent worsening chest pain/tightness/pressure, shortness of breath, lightheaded or dizziness  - Continue rosuvastatin  -Continue current hypertension regimen  - Continue CPAP use  - Covid test as recommended-pending  - Pre angio teach by RENETTA Boateng-pending     History of Present Illness/Subjective    Jerad Ross is a 61 year old years old  with a significant past medical history of hypertension, kidney transplant related, non-STEMI, coronary artery disease with status post CABG x3, dyslipidemia, obstructive sleep apnea on CPAP, glaucoma, chronic hepatitis B, GERD, and depression who is seen at [facility] for  is here for pre procedure history and physical examination for coronary angiogram.    Patient had a follow-up appointment with Dr. Lundberg in July this year.  He was experiencing some tightness in his chest.Nuclear stress test suggested medium sized area of moderate ischemia involving mid to  basal inferior and inferolateral wall involving distal anterior anterolateral wall.  Also noted small area of infarct at the apex which is new.  Patient was recommended to have coronary angiogram with possible percutaneous coronary intervention.    Today, Jerad reports he does get chest tightness, fatigue and also feels lightheadedness and dizziness with exertion.   He denies shortness of breath, dyspnea on exertion, orthopnea, PND, palpitations, abdominal fullness/bloating and lower extremity edema.  He further denies fever, chills, N/V, diarrhea, vomiting, constipation, hematochezia, hematemesis, hemoptysis. He  also denies malignant hyperthermia, stroke/TIA, bleeding or clotting disorders, COPD, anaphylaxis reaction to medications, IV contrast or sea food.  He has had reaction to contrast as a child but he has been tolerating recent contrast exposure.  He is also on naltrexone prescribed by the psychiatric provider.  He is wondering if this will cause any drug interaction with upcoming procedure.  He will check with his psychiatric provider.    Lexiscan Stress test done on 9/8/21 - Reviewed  Result Text      The nuclear stress test is abnormal.     Lexiscan stress ECG negative for ischemia     Nuclear images demonstrate a medium size area of moderate ischemia involving the mid to basal inferior and inferolateral wall. There also appears to be a small area of mild ischemia involving the distal anterior and anterolateral wall. Small area of infarct at the apex also noted.     The left ventricular ejection fraction at stress is 67% with slight apical hypokinesis.     The patient is at an intermediate risk of future cardiac ischemic events.     A prior study was conducted on 5/29/2020. Small area of distal anterior and anterolateral ischemia appears new.     Physical Examination Review of Systems   /62 (BP Location: Left arm, Patient Position: Sitting, Cuff Size: Adult Regular)   Pulse 68   Resp 16   Wt 75 kg (165 lb 6.4 oz)   BMI 25.91 kg/m    Body mass index is 25.91 kg/m .  Wt Readings from Last 3 Encounters:   09/13/21 75 kg (165 lb 6.4 oz)   09/02/21 76.4 kg (168 lb 6.4 oz)   08/02/21 72.6 kg (160 lb)     General Appearance:   no distress, normal body habitus   ENT/Mouth: membranes moist, no oral lesions or bleeding gums.      EYES:  no scleral icterus, normal conjunctivae   Neck: no carotid bruits or thyromegaly   Chest/Lungs:   lungs are clear to auscultation, no rales or wheezing, equal chest wall expansion    Cardiovascular:    Heart rate regular. Normal first and second heart sounds with no murmurs, rubs,  or gallops; the carotid, radial and posterior tibial pulses are intact, Jugular venous pressure flat, no edema bilaterally    Abdomen:  no organomegaly, masses, bruits, or tenderness; bowel sounds are present   Extremities   no cyanosis or clubbing.  Radial pulses and Pedal pulses intact and symmetrical.  CMS intact.   Skin: no xanthelasma, warm.    Neurologic: normal  bilateral, no tremors     Psychiatric: alert and oriented x3, calm              Negative unless noted in HPI     Medical History  Surgical History Family History Social History   Past Medical History:   Diagnosis Date     Acute midline low back pain without sciatica      AION (acute ischemic optic neuropathy)      AION (acute ischemic optic neuropathy)      Anemia in chronic renal disease      Anemia in chronic renal disease      Avascular necrosis of bones of both hips (H)     s/p bilateral hip replacements     Avascular necrosis of bones of both hips (H)     s/p bilateral hip replacements     Basal cell carcinoma      Basal cell carcinoma      Chronic hepatitis B (H)      Chronic hepatitis B (H)      Clostridium difficile colitis      Clostridium difficile colitis      Coronary artery disease involving native coronary artery of native heart without angina pectoris 6/17/2014    Coronary angiogram 6/17/14: Severe distal 3-vessel disease involving small vessels, not amenable to PCI or CABG.     Coronary artery disease involving native coronary artery of native heart without angina pectoris 06/17/2014    Coronary angiogram 6/17/14: Severe distal 3-vessel disease involving small vessels, not amenable to PCI or CABG     CRP elevated      Depression      Depression      Diverticulosis      Diverticulosis      Dyslipidemia      Dyslipidemia      Elevated C-reactive protein (CRP)      FSGS (focal segmental glomerulosclerosis)      FSGS (focal segmental glomerulosclerosis)      Gastric ulcer with hemorrhage 2/12/12     Gastric ulcer with hemorrhage  02/12/2012     GERD (gastroesophageal reflux disease)      GERD (gastroesophageal reflux disease)      Glaucoma     OHTN     Glaucoma      Hemorrhoids      Hemorrhoids      HTN (hypertension)      Hyperlipidemia      Hypertension secondary to other renal disorders      Hypogonadism in male      Hypogonadism in male      Immunosuppressed status (H)      Kidney replaced by transplant     focal glomerulosclerosis      Kidney transplanted     focal glomerulosclerosis      NSTEMI (non-ST elevated myocardial infarction) (H) 6/17/2014     NSTEMI (non-ST elevated myocardial infarction) (H) 06/17/2014     JULIANE (obstructive sleep apnea)     Doesn't use CPAP     JULIANE (obstructive sleep apnea)      Paracentral scotoma     LE     Paracentral scotoma     LE     Secondary renal hyperparathyroidism (H)      Secondary renal hyperparathyroidism (H)      Septic bursitis      Squamous cell carcinoma      Squamous cell carcinoma     Past Surgical History:   Procedure Laterality Date     APPENDECTOMY       APPENDECTOMY       Cardiac Bypass surgery  06/08/2020    [facility]      CATARACT EXTRACTION EXTRACAPSULAR W/ INTRAOCULAR LENS IMPLANTATION Bilateral 4-20-10, 6-1-10     CATARACT IOL, RT/LT  4/19/2000    RE     CATARACT IOL, RT/LT  6/1/2000    LE     COLECTOMY PARTIAL  1983     10 cm, diverticulitis      COLECTOMY SUBTOTAL  1983    10 cm, diverticulitis     COLONOSCOPY  2/13/2012    Procedure:COLONOSCOPY; Surgeon:SLOAN GALLARDO; Location: GI     COLONOSCOPY N/A 1/22/2020    Procedure: Colonoscopy, With Polypectomy And Biopsy;  Surgeon: Aston Kiran MD;  Location:  GI     COLONOSCOPY  02/13/2012     CV CORONARY ANGIOGRAM N/A 6/2/2020    Procedure: Coronary Angiogram;  Surgeon: Alona Florentino MD;  Location: [facility] Lab;  Service: Cardiology     CV LEFT HEART CATHETERIZATION WITHOUT LEFT VENTRICULOGRAM Left 6/2/2020    Procedure: Left Heart Catheterization Without Left Ventriculogram;  Surgeon:  Alona Florentino MD;  Location: [facility] Cath Lab;  Service: Cardiology     ESOPHAGOSCOPY, GASTROSCOPY, DUODENOSCOPY (EGD), COMBINED  2/13/2012    Procedure:COMBINED ESOPHAGOSCOPY, GASTROSCOPY, DUODENOSCOPY (EGD); Surgeon:SLOAN GALLARDO; Location: GI     ESOPHAGOSCOPY, GASTROSCOPY, DUODENOSCOPY (EGD), COMBINED  02/13/2012     EXTRACAPSULAR CATARACT EXTRATION WITH INTRAOCULAR LENS IMPLANT  4-20-10, 6-1-10    Rt, Lt     HERNIA REPAIR  1995    Lt inguinal     HERNIA REPAIR  1995    Lt inguinal     HIP SURGERY      1981, bilat MITZI, revised 2001, 2005     HIP SURGERY  1981    bilat MITZI, revised 2001, 2005     KIDNEY SURGERY  1978 and 1993    transplant     KIDNEY SURGERY  1978, 1993    transplant     KNEE SURGERY  1983, 1987    bilat TKA     KNEE SURGERY Bilateral 1983, 1987     MOHS MICROGRAPHIC PROCEDURE       MOHS MICROGRAPHIC PROCEDURE       OTHER SURGICAL HISTORY      kidney [id number], 1993     PHACOEMULSIFICATION CLEAR CORNEA WITH STANDARD INTRAOCULAR LENS IMPLANT Right 04/19/2000     PHACOEMULSIFICATION CLEAR CORNEA WITH STANDARD INTRAOCULAR LENS IMPLANT Left 06/01/2000     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, auxiliary spleen     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, auxiliary spleen     TONSILLECTOMY       TONSILLECTOMY      Family History   Problem Relation Age of Onset     Cardiovascular Father         AI with valve repair     Hypertension Father      Kidney Disease Father      Cancer Paternal Grandmother         ovarian ca     Cerebrovascular Disease Paternal Grandfather      Cerebrovascular Disease Maternal Grandfather      Kidney Disease Paternal Aunt      Skin Cancer No family hx of      Glaucoma No family hx of      Macular Degeneration No family hx of      Amblyopia No family hx of      Melanoma No family hx of      Other - See Comments Father         AI with valve repair     Other - See Comments Maternal Grandfather         cerebrovascular disease     Ovarian Cancer Paternal Grandmother       Kidney Disease Paternal Aunt     Social History     Socioeconomic History     Marital status:      Spouse name: Not on file     Number of children: 0     Years of education: Not on file     Highest education level: Not on file   Occupational History     Employer: DISABLED     Occupation:      Employer: [employer]   Tobacco Use     Smoking status: Former Smoker     Packs/day: 1.00     Years: 9.00     Pack years: 9.00     Quit date: 1988     Years since quittin.7     Smokeless tobacco: Former User   Substance and Sexual Activity     Alcohol use: Yes     Alcohol/week: 0.0 standard drinks     Comment: rarely 1 drink per month     Drug use: No     Sexual activity: Not on file   Other Topics Concern     Parent/sibling w/ CABG, MI or angioplasty before 65F 55M? Not Asked      Service Not Asked     Blood Transfusions Not Asked     Caffeine Concern Not Asked     Occupational Exposure Not Asked     Hobby Hazards Not Asked     Sleep Concern Not Asked     Stress Concern Not Asked     Weight Concern Not Asked     Special Diet No     Back Care Not Asked     Exercise Yes     Comment: walks     Bike Helmet Not Asked     Seat Belt Not Asked     Self-Exams Not Asked   Social History Narrative    . Wife is also a kidney transplant recipient.     Works part-time     Social Determinants of Health     Financial Resource Strain:      Difficulty of Paying Living Expenses:    Food Insecurity:      Worried About Running Out of Food in the Last Year:      Ran Out of Food in the Last Year:    Transportation Needs:      Lack of Transportation (Medical):      Lack of Transportation (Non-Medical):    Physical Activity:      Days of Exercise per Week:      Minutes of Exercise per Session:    Stress:      Feeling of Stress :    Social Connections:      Frequency of Communication with Friends and Family:      Frequency of Social Gatherings with Friends and Family:      Attends [employer] Services:       Active Member of Clubs or Organizations:      Attends Club or Organization Meetings:      Marital Status:    Intimate Partner Violence:      Fear of Current or Ex-Partner:      Emotionally Abused:      Physically Abused:      Sexually Abused:           Medications  Allergies   Current Outpatient Medications   Medication Sig Dispense Refill     acetaminophen (TYLENOL) 325 MG tablet Take 1-2 tablets by mouth every 6 hours as needed.       albuterol (PROAIR HFA) 108 (90 Base) MCG/ACT inhaler Inhale 2 puffs into the lungs every 6 hours as needed for shortness of breath / dyspnea or wheezing 1 Inhaler 2     aspirin 81 MG tablet Take 81 mg by mouth daily        budesonide (PULMICORT FLEXHALER) 90 MCG/ACT inhaler Inhale 2 puffs into the lungs 2 times daily 1 each 11     calcium citrate-vitamin D (CITRACAL) 315-200 MG-UNIT TABS Take 1 tablet by mouth daily.       entecavir (BARACLUDE) 0.5 MG tablet Take 1 tablet (0.5 mg) by mouth daily 90 tablet 3     FLUoxetine (PROZAC) 10 MG capsule Take 1 capsule (10 mg) by mouth daily With 40mg daily for a total daily dose of 50mg 90 capsule 0     FLUoxetine (PROZAC) 40 MG capsule Take 1 capsule (40 mg) by mouth daily 90 capsule 0     fluticasone-salmeterol (ADVAIR) 250-50 MCG/DOSE inhaler Inhale 1 puff into the lungs every 12 hours 60 Inhaler 11     latanoprost (XALATAN) 0.005 % ophthalmic solution Place 1 drop into both eyes At Bedtime 2.5 mL 4     metoprolol tartrate (LOPRESSOR) 25 MG tablet TAKE 1/2 TABLET BY MOUTH TWICE DAILY       Multiple Vitamins-Iron (ONE DAILY MULTIVITAMIN/IRON) TABS Take 1 tablet by mouth daily       mycophenolate (GENERIC EQUIVALENT) 250 MG capsule Take 3 capsules (750 mg) by mouth 2 times daily 540 capsule 3     naltrexone (DEPADE/REVIA) 50 MG tablet Take 1 tablet (50 mg) by mouth daily 90 tablet 0     Omega-3 Fatty Acids (OMEGA 3 PO) Take 1 capsule by mouth daily Dose unknown       pantoprazole (PROTONIX) 40 MG EC tablet Take 1 tablet (40 mg) by mouth  daily 90 tablet 2     polyethylene glycol (MIRALAX) 17 g packet Take 17 g by mouth       predniSONE (DELTASONE) 5 MG tablet Take 1 tablet (5 mg) by mouth daily 90 tablet 3     rosuvastatin (CRESTOR) 20 MG tablet Take 1 tablet (20 mg) by mouth daily 90 tablet 0     testosterone (ANDROGEL/TESTIM) 50 MG/5GM (1%) topical gel Place 1 packet (50 mg of testosterone) onto the skin daily Apply to the clean, dry intact skin of the shoulders, upper arms or abdomen. 60 packet 1     nitroGLYcerin (NITROSTAT) 0.4 MG sublingual tablet Place 0.4 mg under the tongue (Patient not taking: Reported on 9/13/2021)      Allergies   Allergen Reactions     Penicillins Shortness Of Breath and Hives     Keflex [Cephalexin Hcl] Other (See Comments)     Pt could not recall reaction     Tetracycline Other (See Comments)     Patient could not recall reaction     Sulfa Drugs Rash         Lab Results    Chemistry/lipid CBC Cardiac Enzymes/BNP/TSH/INR   Lab Results   Component Value Date    CHOL 154 10/28/2020    HDL 65 10/28/2020    TRIG 127 10/28/2020    BUN 13 08/02/2021     08/02/2021    CO2 28 08/02/2021    Lab Results   Component Value Date    WBC 9.5 08/02/2021    HGB 14.3 08/02/2021    HCT 45.6 08/02/2021    MCV 89 08/02/2021     08/02/2021    Lab Results   Component Value Date    TROPONINI 0.09 05/29/2020    TSH 0.68 09/02/2021    INR 1.03 04/12/2021        42 minutes spent on the date of encounter doing chart review, review of test results, interpretation with above tests, patient visit, documentation and discussion with other provider.        This note has been dictated using voice recognition software. Any grammatical, typographical, or context distortions are unintentional and inherent to the software                                       Thank you for allowing me to participate in the care of your patient.      Sincerely,     RONALDO Crawford [facility]  cc:   No referring provider defined for this encounter.

## 2021-09-14 DIAGNOSIS — Z11.59 ENCOUNTER FOR SCREENING FOR OTHER VIRAL DISEASES: ICD-10-CM

## 2021-09-14 NOTE — TELEPHONE ENCOUNTER
Roderick sorensen,     Typically the recommendation is to stop naltrexone >/= 3 days before surgery and a taper would not be needed. Depending on what opioid he receives during surgery, he would have to wait 7-10 days after stopping it to restart naltrexone. I would anticipate that his surgeon would have guidance for him too as part of pre-/post-op instructions.     Thank you,     Leah BASS for patient with above information.  Invited him to call back if he has any further of questions.

## 2021-09-15 ENCOUNTER — TELEPHONE (OUTPATIENT)
Dept: INTERNAL MEDICINE | Facility: CLINIC | Age: 61
End: 2021-09-15

## 2021-09-15 NOTE — TELEPHONE ENCOUNTER
Left message with [facility] number to schedule a return in about 4 weeks (around 9/30/2021) with Dr Estrada per provider last visit.

## 2021-09-17 ENCOUNTER — PREP FOR PROCEDURE (OUTPATIENT)
Dept: CARDIOLOGY | Facility: CLINIC | Age: 61
End: 2021-09-17

## 2021-09-17 ENCOUNTER — LAB (OUTPATIENT)
Dept: LAB | Facility: CLINIC | Age: 61
End: 2021-09-17
Payer: COMMERCIAL

## 2021-09-17 ENCOUNTER — TELEPHONE (OUTPATIENT)
Dept: CARDIOLOGY | Facility: CLINIC | Age: 61
End: 2021-09-17

## 2021-09-17 DIAGNOSIS — Z11.59 ENCOUNTER FOR SCREENING FOR OTHER VIRAL DISEASES: ICD-10-CM

## 2021-09-17 DIAGNOSIS — R94.39 ABNORMAL CARDIOVASCULAR STRESS TEST: Primary | ICD-10-CM

## 2021-09-17 PROCEDURE — U0003 INFECTIOUS AGENT DETECTION BY NUCLEIC ACID (DNA OR RNA); SEVERE ACUTE RESPIRATORY SYNDROME CORONAVIRUS 2 (SARS-COV-2) (CORONAVIRUS DISEASE [COVID-19]), AMPLIFIED PROBE TECHNIQUE, MAKING USE OF HIGH THROUGHPUT TECHNOLOGIES AS DESCRIBED BY CMS-2020-01-R: HCPCS

## 2021-09-17 PROCEDURE — U0005 INFEC AGEN DETEC AMPLI PROBE: HCPCS

## 2021-09-17 RX ORDER — ASPIRIN 81 MG/1
243 TABLET, CHEWABLE ORAL ONCE
Status: CANCELLED | OUTPATIENT
Start: 2021-09-17

## 2021-09-17 RX ORDER — LIDOCAINE 40 MG/G
CREAM TOPICAL
Status: CANCELLED | OUTPATIENT
Start: 2021-09-17

## 2021-09-17 RX ORDER — DIAZEPAM 5 MG
10 TABLET ORAL
Status: CANCELLED | OUTPATIENT
Start: 2021-09-17

## 2021-09-17 RX ORDER — FENTANYL CITRATE 50 UG/ML
25 INJECTION, SOLUTION INTRAMUSCULAR; INTRAVENOUS
Status: DISCONTINUED | OUTPATIENT
Start: 2021-09-17 | End: 2021-09-17 | Stop reason: HOSPADM

## 2021-09-17 RX ORDER — ASPIRIN 325 MG
325 TABLET ORAL ONCE
Status: CANCELLED | OUTPATIENT
Start: 2021-09-17 | End: 2021-09-17

## 2021-09-17 RX ORDER — SODIUM CHLORIDE 9 MG/ML
INJECTION, SOLUTION INTRAVENOUS CONTINUOUS
Status: CANCELLED | OUTPATIENT
Start: 2021-09-17

## 2021-09-17 NOTE — TELEPHONE ENCOUNTER
----- Message from Woo Jessica sent at 9/14/2021  4:39 PM CDT -----  CORS POSS PCI WITH EMG/PTK     730AM ADMIT ON 9/20    H&P ON 9/13 WITH CHIME     ALERTS: GRAFTS AND HX OF RENAL TRANSPLANT     COVID TESTING ON 9/17 IN [facility]     THANKS!   -WOO  ----- Message -----  From: Monet Soriano RN  Sent: 9/13/2021   4:54 PM CDT  To: Woo Jessica    Hi!  Jerad's kidney function looked good. Feel free to schedule - no renal protocol.   Thanks,  Mal

## 2021-09-17 NOTE — TELEPHONE ENCOUNTER
Jerad Ross  [address] [place] [address]  [phone] (home)     Primary cardiologist:  Dr. Lundberg  PCP:  Aston Perry  Procedure:  Coronary angiogram with possible percutaneous cardiac intervention   H&P completed by:  9/13 with CY  Case MD:  Chadd or Magnus  Admit date and time:  9/20 0730  Case start time:  TBD  Ordering MD:  AKIL  Diagnosis:  Abnormal stress test  Anticoagulation: None  CPAP:no  Bypass Grafts: Yes  Renal Issues: No * History of renal transplant x2 but normal function*  Allergies:   Allergies   Allergen Reactions     Penicillins Shortness Of Breath and Hives     Keflex [Cephalexin Hcl] Other (See Comments)     Pt could not recall reaction     Tetracycline Other (See Comments)     Patient could not recall reaction     Sulfa Drugs Rash     Diabetic?: No  Device?: No      Angiogram Teaching    Reason for Visit:  Telephone call to discuss pre-procedure education in preparation for: Coronary angiogram with pss pci     Procedure Prep:  Cardiologist note dated: 9/13 with LA  EKG results obtained, dated: on admission   Pertinent test results obtained - Viewable in Epic, dated: CAABG x3 in June 2020  Hemogram results obtained: on admission   Basic Metabolic Panel results obtained: on admission   Lipid Profile results obtained: on admission   COVID 19 Test results obtained: 9/17 at [facility]- pending      Pre-procedure instructions  Patient instructed to be NPO after midnight.  Patient instructed to arrange for transportation home following procedure.  Patient instructed to have a responsible adult with them for 24 hours post-procedure.  Post-procedure follow up process.  Conscious sedation discussed.  The patient was sent the pre-procedure letter (If requested) on 9/16- [facility]    Pre-procedure medication instructions  Patient instructed on antiplatelet medication.  Continue medications as scheduled, with a small amount of water on the day of the procedure unless  indicated.  Patient instructed to take 325 mg of Aspirin am of procedure: Yes  Other medication: instructed to hold supplements the  a.m. of the procedure.    *PATIENTS RECORDS AVAILABLE IN [facility] UNLESS OTHERWISE INDICATED*    *Order set was entered on this date: 9/17/2021      Patient Active Problem List   Diagnosis     BCC (basal cell carcinoma), trunk     JULIANE (obstructive sleep apnea)     HTN, kidney transplant related     Coronary artery disease involving native coronary artery of native heart without angina pectoris     NSTEMI (non-ST elevated myocardial infarction) (H)     Depression     Diverticulosis     Hemorrhoids     Kidney replaced by transplant     Basal cell carcinoma     Squamous cell carcinoma     Dyslipidemia     CRP elevated     Glaucoma     AION (acute ischemic optic neuropathy)     Paracentral scotoma     Hip pain     Inflamed seborrheic keratosis     Intertrigo     Chronic hepatitis B (H)     Immunosuppression (H)     Hypogonadism in male     GERD (gastroesophageal reflux disease)     Aftercare following organ transplant     Acute midline low back pain without sciatica     Septic bursitis     Impaired mobility     Infection of lumbar spine (H)     S/P CABG (coronary artery bypass graft)     Abnormal cardiovascular stress test       Current Outpatient Medications   Medication Sig Dispense Refill     acetaminophen (TYLENOL) 325 MG tablet Take 1-2 tablets by mouth every 6 hours as needed.       albuterol (PROAIR HFA) 108 (90 Base) MCG/ACT inhaler Inhale 2 puffs into the lungs every 6 hours as needed for shortness of breath / dyspnea or wheezing 1 Inhaler 2     aspirin 81 MG tablet Take 81 mg by mouth daily        budesonide (PULMICORT FLEXHALER) 90 MCG/ACT inhaler Inhale 2 puffs into the lungs 2 times daily 1 each 11     calcium citrate-vitamin D (CITRACAL) 315-200 MG-UNIT TABS Take 1 tablet by mouth daily.       entecavir (BARACLUDE) 0.5 MG tablet Take 1 tablet (0.5 mg) by mouth daily 90 tablet 3      FLUoxetine (PROZAC) 10 MG capsule Take 1 capsule (10 mg) by mouth daily With 40mg daily for a total daily dose of 50mg 90 capsule 0     FLUoxetine (PROZAC) 40 MG capsule Take 1 capsule (40 mg) by mouth daily 90 capsule 0     fluticasone-salmeterol (ADVAIR) 250-50 MCG/DOSE inhaler Inhale 1 puff into the lungs every 12 hours 60 Inhaler 11     latanoprost (XALATAN) 0.005 % ophthalmic solution Place 1 drop into both eyes At Bedtime 2.5 mL 4     metoprolol tartrate (LOPRESSOR) 25 MG tablet TAKE 1/2 TABLET BY MOUTH TWICE DAILY       Multiple Vitamins-Iron (ONE DAILY MULTIVITAMIN/IRON) TABS Take 1 tablet by mouth daily       mycophenolate (GENERIC EQUIVALENT) 250 MG capsule Take 3 capsules (750 mg) by mouth 2 times daily 540 capsule 3     naltrexone (DEPADE/REVIA) 50 MG tablet Take 1 tablet (50 mg) by mouth daily 90 tablet 0     nitroGLYcerin (NITROSTAT) 0.4 MG sublingual tablet Place 0.4 mg under the tongue (Patient not taking: Reported on 9/13/2021)       Omega-3 Fatty Acids (OMEGA 3 PO) Take 1 capsule by mouth daily Dose unknown       pantoprazole (PROTONIX) 40 MG EC tablet Take 1 tablet (40 mg) by mouth daily 90 tablet 2     polyethylene glycol (MIRALAX) 17 g packet Take 17 g by mouth       predniSONE (DELTASONE) 5 MG tablet Take 1 tablet (5 mg) by mouth daily 90 tablet 3     rosuvastatin (CRESTOR) 20 MG tablet Take 1 tablet (20 mg) by mouth daily 90 tablet 0     testosterone (ANDROGEL/TESTIM) 50 MG/5GM (1%) topical gel Place 1 packet (50 mg of testosterone) onto the skin daily Apply to the clean, dry intact skin of the shoulders, upper arms or abdomen. 60 packet 1       Allergies   Allergen Reactions     Penicillins Shortness Of Breath and Hives     Keflex [Cephalexin Hcl] Other (See Comments)     Pt could not recall reaction     Tetracycline Other (See Comments)     Patient could not recall reaction     Sulfa Drugs Rash       Plan  Pt's friend will be his  and he will present contact information to the  nurse in [facility] for updates and when to  patient.   Patient ready for procedure and verbalized understanding of instructions provided. Patient ready to proceed.     RN Monet Soriano, RN

## 2021-09-18 LAB — SARS-COV-2 RNA RESP QL NAA+PROBE: NEGATIVE

## 2021-09-20 ENCOUNTER — HOSPITAL ENCOUNTER (OUTPATIENT)
Facility: HOSPITAL | Age: 61
Discharge: HOME | End: 2021-09-20
Attending: INTERNAL MEDICINE | Admitting: INTERNAL MEDICINE
Payer: COMMERCIAL

## 2021-09-20 VITALS
BODY MASS INDEX: 25.74 KG/M2 | DIASTOLIC BLOOD PRESSURE: 65 MMHG | WEIGHT: 164 LBS | OXYGEN SATURATION: 97 % | RESPIRATION RATE: 24 BRPM | HEART RATE: 65 BPM | SYSTOLIC BLOOD PRESSURE: 105 MMHG | HEIGHT: 67 IN | TEMPERATURE: 97.8 F

## 2021-09-20 DIAGNOSIS — Z95.1 S/P CABG (CORONARY ARTERY BYPASS GRAFT): ICD-10-CM

## 2021-09-20 DIAGNOSIS — R94.39 ABNORMAL CARDIOVASCULAR STRESS TEST: ICD-10-CM

## 2021-09-20 DIAGNOSIS — I25.10 CORONARY ARTERY DISEASE INVOLVING NATIVE CORONARY ARTERY OF NATIVE HEART WITHOUT ANGINA PECTORIS: Primary | ICD-10-CM

## 2021-09-20 PROBLEM — Z98.890 STATUS POST CORONARY ANGIOGRAM: Status: ACTIVE | Noted: 2021-09-20

## 2021-09-20 LAB
ANION GAP SERPL CALCULATED.3IONS-SCNC: 9 MMOL/L (ref 5–18)
ATRIAL RATE - MUSE: 63 BPM
BUN SERPL-MCNC: 13 MG/DL (ref 8–22)
CALCIUM SERPL-MCNC: 9.7 MG/DL (ref 8.5–10.5)
CHLORIDE BLD-SCNC: 107 MMOL/L (ref 98–107)
CHOLEST SERPL-MCNC: 133 MG/DL
CO2 SERPL-SCNC: 26 MMOL/L (ref 22–31)
CREAT SERPL-MCNC: 0.77 MG/DL (ref 0.7–1.3)
DIASTOLIC BLOOD PRESSURE - MUSE: NORMAL MMHG
ERYTHROCYTE [DISTWIDTH] IN BLOOD BY AUTOMATED COUNT: 17.1 % (ref 10–15)
FASTING STATUS PATIENT QL REPORTED: YES
GFR SERPL CREATININE-BSD FRML MDRD: >90 ML/MIN/1.73M2
GLUCOSE BLD-MCNC: 90 MG/DL (ref 70–125)
HCT VFR BLD AUTO: 42.8 % (ref 40–53)
HDLC SERPL-MCNC: 49 MG/DL
HGB BLD-MCNC: 13.1 G/DL (ref 13.3–17.7)
INTERPRETATION ECG - MUSE: NORMAL
LDLC SERPL CALC-MCNC: 62 MG/DL
MCH RBC QN AUTO: 27.8 PG (ref 26.5–33)
MCHC RBC AUTO-ENTMCNC: 30.6 G/DL (ref 31.5–36.5)
MCV RBC AUTO: 91 FL (ref 78–100)
P AXIS - MUSE: 30 DEGREES
PLATELET # BLD AUTO: 279 10E3/UL (ref 150–450)
POTASSIUM BLD-SCNC: 4.4 MMOL/L (ref 3.5–5)
PR INTERVAL - MUSE: 182 MS
QRS DURATION - MUSE: 82 MS
QT - MUSE: 434 MS
QTC - MUSE: 444 MS
R AXIS - MUSE: -18 DEGREES
RBC # BLD AUTO: 4.72 10E6/UL (ref 4.4–5.9)
SODIUM SERPL-SCNC: 142 MMOL/L (ref 136–145)
SYSTOLIC BLOOD PRESSURE - MUSE: NORMAL MMHG
T AXIS - MUSE: 35 DEGREES
TRIGL SERPL-MCNC: 108 MG/DL
VENTRICULAR RATE- MUSE: 63 BPM
WBC # BLD AUTO: 7.6 10E3/UL (ref 4–11)

## 2021-09-20 PROCEDURE — 75605 CONTRAST EXAM THORACIC AORTA: CPT | Mod: 26 | Performed by: INTERNAL MEDICINE

## 2021-09-20 PROCEDURE — 93005 ELECTROCARDIOGRAM TRACING: CPT

## 2021-09-20 PROCEDURE — 80048 BASIC METABOLIC PNL TOTAL CA: CPT | Performed by: INTERNAL MEDICINE

## 2021-09-20 PROCEDURE — 93455 CORONARY ART/GRFT ANGIO S&I: CPT | Mod: 26 | Performed by: INTERNAL MEDICINE

## 2021-09-20 PROCEDURE — 272N000001 HC OR GENERAL SUPPLY STERILE: Performed by: INTERNAL MEDICINE

## 2021-09-20 PROCEDURE — 93455 CORONARY ART/GRFT ANGIO S&I: CPT | Performed by: INTERNAL MEDICINE

## 2021-09-20 PROCEDURE — 250N000013 HC RX MED GY IP 250 OP 250 PS 637: Performed by: INTERNAL MEDICINE

## 2021-09-20 PROCEDURE — 999N000054 HC STATISTIC EKG NON-CHARGEABLE

## 2021-09-20 PROCEDURE — 93010 ELECTROCARDIOGRAM REPORT: CPT | Performed by: INTERNAL MEDICINE

## 2021-09-20 PROCEDURE — 255N000002 HC RX 255 OP 636: Performed by: INTERNAL MEDICINE

## 2021-09-20 PROCEDURE — 36415 COLL VENOUS BLD VENIPUNCTURE: CPT | Performed by: INTERNAL MEDICINE

## 2021-09-20 PROCEDURE — 85027 COMPLETE CBC AUTOMATED: CPT | Performed by: INTERNAL MEDICINE

## 2021-09-20 PROCEDURE — 99153 MOD SED SAME PHYS/QHP EA: CPT | Performed by: INTERNAL MEDICINE

## 2021-09-20 PROCEDURE — 99152 MOD SED SAME PHYS/QHP 5/>YRS: CPT | Performed by: INTERNAL MEDICINE

## 2021-09-20 PROCEDURE — 258N000003 HC RX IP 258 OP 636: Performed by: INTERNAL MEDICINE

## 2021-09-20 PROCEDURE — 93459 L HRT ART/GRFT ANGIO: CPT | Performed by: INTERNAL MEDICINE

## 2021-09-20 PROCEDURE — 250N000009 HC RX 250: Performed by: INTERNAL MEDICINE

## 2021-09-20 PROCEDURE — 80061 LIPID PANEL: CPT | Performed by: INTERNAL MEDICINE

## 2021-09-20 PROCEDURE — 250N000011 HC RX IP 250 OP 636: Performed by: INTERNAL MEDICINE

## 2021-09-20 RX ORDER — ACETAMINOPHEN 325 MG/1
650 TABLET ORAL EVERY 4 HOURS PRN
Status: DISCONTINUED | OUTPATIENT
Start: 2021-09-20 | End: 2021-09-20 | Stop reason: HOSPADM

## 2021-09-20 RX ORDER — DIAZEPAM 5 MG
10 TABLET ORAL
Status: COMPLETED | OUTPATIENT
Start: 2021-09-20 | End: 2021-09-20

## 2021-09-20 RX ORDER — FLUMAZENIL 0.1 MG/ML
0.2 INJECTION, SOLUTION INTRAVENOUS
Status: DISCONTINUED | OUTPATIENT
Start: 2021-09-20 | End: 2021-09-20 | Stop reason: HOSPADM

## 2021-09-20 RX ORDER — FENTANYL CITRATE 50 UG/ML
INJECTION, SOLUTION INTRAMUSCULAR; INTRAVENOUS
Status: DISCONTINUED | OUTPATIENT
Start: 2021-09-20 | End: 2021-09-20 | Stop reason: HOSPADM

## 2021-09-20 RX ORDER — LIDOCAINE 40 MG/G
CREAM TOPICAL
Status: DISCONTINUED | OUTPATIENT
Start: 2021-09-20 | End: 2021-09-20 | Stop reason: HOSPADM

## 2021-09-20 RX ORDER — ATROPINE SULFATE 0.1 MG/ML
0.5 INJECTION INTRAVENOUS
Status: DISCONTINUED | OUTPATIENT
Start: 2021-09-20 | End: 2021-09-20 | Stop reason: HOSPADM

## 2021-09-20 RX ORDER — IODIXANOL 320 MG/ML
INJECTION, SOLUTION INTRAVASCULAR
Status: DISCONTINUED | OUTPATIENT
Start: 2021-09-20 | End: 2021-09-20 | Stop reason: HOSPADM

## 2021-09-20 RX ORDER — OXYCODONE HYDROCHLORIDE 5 MG/1
5 TABLET ORAL EVERY 4 HOURS PRN
Status: DISCONTINUED | OUTPATIENT
Start: 2021-09-20 | End: 2021-09-20 | Stop reason: HOSPADM

## 2021-09-20 RX ORDER — ASPIRIN 325 MG
325 TABLET ORAL ONCE
Status: DISCONTINUED | OUTPATIENT
Start: 2021-09-20 | End: 2021-09-20 | Stop reason: HOSPADM

## 2021-09-20 RX ORDER — NALOXONE HYDROCHLORIDE 0.4 MG/ML
0.2 INJECTION, SOLUTION INTRAMUSCULAR; INTRAVENOUS; SUBCUTANEOUS
Status: DISCONTINUED | OUTPATIENT
Start: 2021-09-20 | End: 2021-09-20 | Stop reason: HOSPADM

## 2021-09-20 RX ORDER — SODIUM CHLORIDE 9 MG/ML
INJECTION, SOLUTION INTRAVENOUS CONTINUOUS
Status: DISCONTINUED | OUTPATIENT
Start: 2021-09-20 | End: 2021-09-20 | Stop reason: HOSPADM

## 2021-09-20 RX ORDER — OXYCODONE HYDROCHLORIDE 5 MG/1
10 TABLET ORAL EVERY 4 HOURS PRN
Status: DISCONTINUED | OUTPATIENT
Start: 2021-09-20 | End: 2021-09-20 | Stop reason: HOSPADM

## 2021-09-20 RX ORDER — NALOXONE HYDROCHLORIDE 0.4 MG/ML
0.4 INJECTION, SOLUTION INTRAMUSCULAR; INTRAVENOUS; SUBCUTANEOUS
Status: DISCONTINUED | OUTPATIENT
Start: 2021-09-20 | End: 2021-09-20 | Stop reason: HOSPADM

## 2021-09-20 RX ORDER — ASPIRIN 81 MG/1
243 TABLET, CHEWABLE ORAL ONCE
Status: DISCONTINUED | OUTPATIENT
Start: 2021-09-20 | End: 2021-09-20 | Stop reason: HOSPADM

## 2021-09-20 RX ADMIN — DIAZEPAM 10 MG: 5 TABLET ORAL at 08:42

## 2021-09-20 RX ADMIN — SODIUM CHLORIDE: 9 INJECTION, SOLUTION INTRAVENOUS at 08:15

## 2021-09-20 ASSESSMENT — MIFFLIN-ST. JEOR: SCORE: 1517.53

## 2021-09-20 NOTE — PLAN OF CARE
Pt admitted to [facility] for CA/P.PCI due to abnormal stress test. Pt has a hx of CAD and has been having sob and chest pain with activity.    Pt prepped and ready for procedure.      Heidi Herzog RN

## 2021-09-20 NOTE — PRE-PROCEDURE
GENERAL PRE-PROCEDURE:   Procedure:  Coronary angiogram with possible PCI  Date/Time:  9/20/2021 7:58 AM    Written consent obtained?: Yes    Risks and benefits: Risks, benefits and alternatives were discussed    Consent given by:  Patient  Patient states understanding of procedure being performed: Yes    Patient's understanding of procedure matches consent: Yes    Procedure consent matches procedure scheduled: Yes    Expected level of sedation:  Moderate  Appropriately NPO:  Yes  ASA Class:  3 (CAD with hx of NSTEMI, s/p CABG, hx of renal transplant, HTN, Dyslipidemia, JULIANE)  Mallampati  :  Grade 1- soft palate, uvula, tonsillar pillars, and posterior pharyngeal wall visible  Lungs:  Lungs clear with good breath sounds bilaterally  Heart:  Other (comment)  Heart exam comment:  Irregular rhythm, normal rate  History & Physical reviewed:  History and physical reviewed and no updates needed  Statement of review:  I have reviewed the lab findings, diagnostic data, medications, and the plan for sedation

## 2021-09-20 NOTE — DISCHARGE INSTRUCTIONS
Interventional Cardiology  Coronary Angiogram/Angioplasty/Stent/Atherectomy Discharge Instructions -   Femoral Approach    The instructions below are to help you understand how to take care of yourself. There is also information about when to call the doctor or emergency services    **Do not stop your aspirin or platelet inhibitor unless directed by your Cardiologist.  These medications help to prevent platelets in your blood from sticking together and forming a clot.  Examples of these medications are:  Ticagrelor (Brilinta), Clopidigrel (Plavix), Prasugrel (Effient)          For the first 72 hours after your procedure:    No driving for 24 hours    Do not lift more than 15 pounds.  If you usually lift 50 pounds or more daily, please contact your cardiologist              Avoid any hard work or tiring activities, this includes, yard work, jogging, biking, sexual activity    During the day get up and walk around every 2 hours    You may return to work after 72 hours if you are feeling well and your job does not involve heavy lifting          Groin Site / Wound Care / Bleeding      You may take off the dressing on your groin the day after your procedure    Keep the area dry and clean    It is ok to shower with regular soap.  Pat dry, do not rub    You do not need to use a bandage    No tub/pool or hot tub for 1 week    If your groin starts to bleed or begins to swell suddenly after leaving the hospital, lie flat and apply firm pressure above the site for 15 minutes.  If bleeding continues, call 9-1-1    Bruising around the groin area is normal.  It may take 2-3 weeks for this to go away.  It is normal for the bruised area to turn green and/or yellow as it is healing.  A small lump may also be present and may last 2-3 months.    Your leg may be sore or stiff for a few days.  You may take Tylenol or a pain medicine recommended by your doctor    If you have a fever over 100.4, that lasts more than one day - call your  cardiologist.      Our Cardiac Rehab staff may visit briefly with you while your in the hospital.  If they miss you, someone will contact you after you are home.  You are encouraged to enroll in an Outpatient Cardiac Rehab Program     ? No elective dental work for 6 weeks after having a stent.     ? If you are on metformin, ActoPlus Met , Glucophage , Glucovance , Avandamet , Fortamet , Glumetza , Janumet , Riomet , PrandiMet, OR Metaglip , resume this medication only after your kidney function test is done and you are told to do so by your primary care provider.    ? No driving for 24 hours      Your Procedural Physician was: Dr. Adam Agudelo  the phone number is: [phone]      [facility]:  [phone]  If you are calling after hours, please listen to the entire voicemail, a live  will answer at the end of the message

## 2021-09-20 NOTE — Clinical Note
dry, intact, no bleeding and hematoma. Small hematoma present prior to pulling sheath. Hematoma growing. Manual pressure held, MD called to assist in the room. MD took over holding manual pressure.

## 2021-09-28 ENCOUNTER — OFFICE VISIT (OUTPATIENT)
Dept: NEPHROLOGY | Facility: CLINIC | Age: 61
End: 2021-09-28
Attending: INTERNAL MEDICINE
Payer: COMMERCIAL

## 2021-09-28 VITALS
OXYGEN SATURATION: 97 % | TEMPERATURE: 98.2 F | BODY MASS INDEX: 26.04 KG/M2 | DIASTOLIC BLOOD PRESSURE: 87 MMHG | RESPIRATION RATE: 18 BRPM | SYSTOLIC BLOOD PRESSURE: 124 MMHG | HEART RATE: 75 BPM | WEIGHT: 165.9 LBS | HEIGHT: 67 IN

## 2021-09-28 DIAGNOSIS — Z94.0 HTN, KIDNEY TRANSPLANT RELATED: ICD-10-CM

## 2021-09-28 DIAGNOSIS — Z48.298 AFTERCARE FOLLOWING ORGAN TRANSPLANT: ICD-10-CM

## 2021-09-28 DIAGNOSIS — Z94.0 KIDNEY REPLACED BY TRANSPLANT: ICD-10-CM

## 2021-09-28 DIAGNOSIS — Z23 NEED FOR INFLUENZA VACCINATION: Primary | ICD-10-CM

## 2021-09-28 DIAGNOSIS — I15.1 HTN, KIDNEY TRANSPLANT RELATED: ICD-10-CM

## 2021-09-28 DIAGNOSIS — D84.9 IMMUNOSUPPRESSION (H): ICD-10-CM

## 2021-09-28 PROCEDURE — 99214 OFFICE O/P EST MOD 30 MIN: CPT | Performed by: INTERNAL MEDICINE

## 2021-09-28 PROCEDURE — 250N000011 HC RX IP 250 OP 636: Performed by: INTERNAL MEDICINE

## 2021-09-28 PROCEDURE — G0463 HOSPITAL OUTPT CLINIC VISIT: HCPCS | Mod: 25

## 2021-09-28 PROCEDURE — 90682 RIV4 VACC RECOMBINANT DNA IM: CPT | Performed by: INTERNAL MEDICINE

## 2021-09-28 PROCEDURE — G0008 ADMIN INFLUENZA VIRUS VAC: HCPCS | Performed by: INTERNAL MEDICINE

## 2021-09-28 RX ADMIN — INFLUENZA A VIRUS A/WISCONSIN/588/2019 (H1N1) RECOMBINANT HEMAGGLUTININ ANTIGEN, INFLUENZA A VIRUS A/TASMANIA/503/2020 (H3N2) RECOMBINANT HEMAGGLUTININ ANTIGEN, INFLUENZA B VIRUS B/WASHINGTON/02/2019 RECOMBINANT HEMAGGLUTININ ANTIGEN, AND INFLUENZA B VIRUS B/PHUKET/3073/2013 RECOMBINANT HEMAGGLUTININ ANTIGEN 0.5 ML: 45; 45; 45; 45 INJECTION INTRAMUSCULAR at 14:57

## 2021-09-28 ASSESSMENT — MIFFLIN-ST. JEOR: SCORE: 1526.15

## 2021-09-28 ASSESSMENT — PAIN SCALES - GENERAL: PAINLEVEL: MODERATE PAIN (4)

## 2021-09-28 NOTE — LETTER
9/28/2021       RE: Jerad Ross  [address] [place] [address]     Dear Colleague,    Thank you for referring your patient, Jerad Ross, to the [facility] [place] [facility]. Please see a copy of my visit note below.    TRANSPLANT NEPHROLOGY CHRONIC POST TRANSPLANT VISIT    Assessment & Plan   # DDKT: Stable   - Baseline Creatinine:  ~ 0.7-0.9   - Proteinuria: Minimal (0.2-0.5 grams)   - Date DSA Last Checked: Not Known      Latest DSA: Not checked recently due to time from transplant   - BK Viremia: Not checked recently due to time from transplant   - Kidney Tx Biopsy: No    # Immunosuppression: Mycophenolate mofetil (dose 750 mg every 12 hours) and Prednisone (dose 5 mg daily)   - Continue with intensive monitoring of immunosuppression for efficacy and toxicity.   - Changes: No    # Infection Prophylaxis:   Last CD4 Level: Not checked  - PJP: None    # Hypertension: Controlled;  Goal BP: < 130/80   - Changes: No    # Anemia in Chronic Renal Disease: Hgb: Stable, near normal      CAROL ANN: No   - Iron studies: Not checked recently    # Mineral Bone Disorder:   - Vitamin D; level: Normal        On supplement: Yes  - Calcium; level: Normal        On supplement: Yes    # CAD, s/p CABG: Some increased dyspnea on exertion, possibly an angina equivalent.  Patient was evaluated with cardiac stress test, which was abnormal.  He underwent coronary angiogram 9/2021 that showed some possible obstructive disease in the 1st diagonal, but unable to stent it.  Bypass grafts were open.  Plan is for medical management.  Patient is followed closely by Cardiology.    # Chronic Hepatitis B: Stable on entecavir.    # Asthma: Some increase in shortness of breath, but felt more cardiac in origin.  Patient is stable on inhalers.    # JULIANE: Patient is unable to use CPAP.   - Recommend follow up with Sleep Clinic.    # GERD: Asymptomatic on pantoprazole,  but with h/o ulcer, will continue on medication.    # Skin Cancer: New lesions: none   - Discussed sun protection and recommend regular follow up with Dermatology.    # Medical Compliance: Yes    # COVID-19 Virus Review: Discussed COVID-19 virus and the potential medical risks.  Reviewed preventative health recommendations, including wearing a mask where appropriate.  Recommended COVID vaccination should be up to date with either an initial vaccination or booster shot when appropriate.  Asked the patient to inform the transplant center if they are exposed or diagnosed with this virus.    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  Significant transplant-related complications: None    Transplant Office Phone Number: [phone]    Assessment and plan was discussed with the patient and he voiced his understanding and agreement.    Return visit: Return in about 1 year (around 9/28/2022).    Nolan Lovett MD    Chief Complaint   Mr. Ross is a 61 year old here for kidney transplant and immunosuppression management.    History of Present Illness     reports feeling good overall with some medical complaints.  Since last clinic visit, patient reports no hospitalizations, but has had some new medical complaints.  He had a significant upper respiratory infection in July, which included some increased shortness of breath and a couple of episodes of chest pain.  Patient tried to treat his symptoms with sublingual NTG, but no improvement.  Patient does have a history of CAD, s/p CABG.  Due to these symptoms, patient underwent a cardiac stress test, which was abnormal.  He then had a coronary angiogram done last week that showed significant coronary  disease with possible concern for a 90% stenosis in 1st diagonal.  Unfortunately, this lesion could not be stented.  His bypass grafts were open.  The plan by Cardiology was for medical management.    His energy level has been okay to maybe a bit decreased.  He is still active, although really has gotten minimal exercise since his URI in July.  No chest pain, but just the increased shortness of breath with exertion, as well as tiring easy.  No leg swelling.    Appetite is okay and weight is stable.  No nausea, vomiting or diarrhea.  No heartburn symptoms on PPI.  No fever, sweats or chills.    Home BP: 110-120/70s    Problem List   Patient Active Problem List   Diagnosis     BCC (basal cell carcinoma), trunk     JULIANE (obstructive sleep apnea)     HTN, kidney transplant related     Coronary artery disease involving native coronary artery of native heart without angina pectoris     NSTEMI (non-ST elevated myocardial infarction) (H)     Depression     Diverticulosis     Hemorrhoids     Kidney replaced by transplant     Basal cell carcinoma     Squamous cell carcinoma     Dyslipidemia     CRP elevated     Glaucoma     AION (acute ischemic optic neuropathy)     Paracentral scotoma     Hip pain     Inflamed seborrheic keratosis     Intertrigo     Chronic hepatitis B (H)     Immunosuppression (H)     Hypogonadism in male     GERD (gastroesophageal reflux disease)     Aftercare following organ transplant     Acute midline low back pain without sciatica     Septic bursitis     Impaired mobility     Infection of lumbar spine (H)     S/P CABG (coronary artery bypass graft)     Abnormal cardiovascular stress test     Status post coronary angiogram       Allergies   Allergies   Allergen Reactions     Penicillins Shortness Of Breath and Hives     Keflex [Cephalexin Hcl] Other (See Comments)     Pt could not recall reaction     Tetracycline Other (See Comments)     Patient could not recall reaction     Sulfa Drugs Rash        Medications   Current Outpatient Medications   Medication Sig     acetaminophen (TYLENOL) 325 MG tablet Take 1-2 tablets by mouth every 6 hours as needed.     albuterol (PROAIR HFA) 108 (90 Base) MCG/ACT inhaler Inhale 2 puffs into the lungs every 6 hours as needed for shortness of breath / dyspnea or wheezing     aspirin 81 MG tablet Take 81 mg by mouth daily      budesonide (PULMICORT FLEXHALER) 90 MCG/ACT inhaler Inhale 2 puffs into the lungs 2 times daily (Patient taking differently: Inhale 2 puffs into the lungs 2 times daily as needed )     calcium citrate-vitamin D (CITRACAL) 315-200 MG-UNIT TABS Take 1 tablet by mouth daily.     entecavir (BARACLUDE) 0.5 MG tablet Take 1 tablet (0.5 mg) by mouth daily     FLUoxetine (PROZAC) 10 MG capsule Take 1 capsule (10 mg) by mouth daily With 40mg daily for a total daily dose of 50mg     FLUoxetine (PROZAC) 40 MG capsule Take 1 capsule (40 mg) by mouth daily     fluticasone-salmeterol (ADVAIR) 250-50 MCG/DOSE inhaler Inhale 1 puff into the lungs every 12 hours (Patient taking differently: Inhale 1 puff into the lungs 2 times daily as needed )     latanoprost (XALATAN) 0.005 % ophthalmic solution Place 1 drop into both eyes At Bedtime     metoprolol tartrate (LOPRESSOR) 25 MG tablet TAKE 1/2 TABLET BY MOUTH TWICE DAILY     Multiple Vitamins-Iron (ONE DAILY MULTIVITAMIN/IRON) TABS Take 1 tablet by mouth daily     mycophenolate (GENERIC EQUIVALENT) 250 MG capsule Take 3 capsules (750 mg) by mouth 2 times daily     nitroGLYcerin (NITROSTAT) 0.4 MG sublingual tablet Place 0.4 mg under the tongue      Omega-3 Fatty Acids (OMEGA 3 PO) Take 1 capsule by mouth daily Dose unknown     pantoprazole (PROTONIX) 40 MG EC tablet Take 1 tablet (40 mg) by mouth daily     polyethylene glycol (MIRALAX) 17 g packet Take 17 g by mouth daily as needed      predniSONE (DELTASONE) 5 MG tablet Take 1 tablet (5 mg) by mouth daily     rosuvastatin (CRESTOR) 20 MG tablet Take 1 tablet (20 mg)  "by mouth daily     No current facility-administered medications for this visit.     Medications Discontinued During This Encounter   Medication Reason     testosterone (ANDROGEL/TESTIM) 50 MG/5GM (1%) topical gel        Physical Exam   Vital Signs: /87 (BP Location: Right arm, Patient Position: Sitting, Cuff Size: Adult Regular)   Pulse 75   Temp 98.2  F (36.8  C) (Oral)   Resp 18   Ht 1.702 m (5' 7\")   Wt 75.3 kg (165 lb 14.4 oz)   SpO2 97%   BMI 25.98 kg/m      GENERAL APPEARANCE: alert and no distress  HENT: mouth without ulcers or lesions  LYMPHATICS: no cervical or supraclavicular nodes  RESP: lungs clear to auscultation - no rales, rhonchi or wheezes  CV: regular rhythm, normal rate, no rub, no murmur  EDEMA: no LE edema bilaterally  ABDOMEN: soft, nondistended, nontender, bowel sounds normal  MS: extremities normal - no gross deformities noted, no evidence of inflammation in joints, no muscle tenderness  SKIN: no rash, actinic keratoses  TX KIDNEY: normal  DIALYSIS ACCESS: none    Data     Renal Latest Ref Rng & Units 9/20/2021 9/13/2021 8/2/2021   Na 136 - 145 mmol/L 142 138 139   K 3.5 - 5.0 mmol/L 4.4 4.0 3.7   Cl 98 - 107 mmol/L 107 101 106   CO2 22 - 31 mmol/L 26 28 28   BUN 8 - 22 mg/dL 13 13 13   Cr 0.70 - 1.30 mg/dL 0.77 0.74 0.82   Glucose 70 - 125 mg/dL 90 85 81   Ca  8.5 - 10.5 mg/dL 9.7 9.9 9.4   Mg 1.8 - 2.6 mg/dL - - -     Bone Health Latest Ref Rng & Units 3/9/2021 10/28/2020 11/5/2018   Phos 2.5 - 4.5 mg/dL - - 2.9   PTHi 12 - 72 pg/mL - - -   Vit D Def 20 - 75 ug/L 49 36 -     Heme Latest Ref Rng & Units 9/20/2021 8/2/2021 4/12/2021   WBC 4.0 - 11.0 10e3/uL 7.6 9.5 9.9   Hgb 13.3 - 17.7 g/dL 13.1(L) 14.3 14.0   Plt 150 - 450 10e3/uL 279 355 310   ABSOLUTE NEUTROPHIL 1.6 - 8.3 10e9/L - - -   ABSOLUTE LYMPHOCYTES 0.8 - 5.3 10e9/L - - -   ABSOLUTE MONOCYTES 0.0 - 1.3 10e9/L - - -   ABSOLUTE EOSINOPHILS 0.0 - 0.7 10e9/L - - -   ABSOLUTE BASOPHILS 0.0 - 0.2 10e9/L - - -   ABS IMMATURE " GRANULOCYTES 0 - 0.4 10e9/L - - -   ABSOLUTE NUCLEATED RBC - - - -     Liver Latest Ref Rng & Units 8/2/2021 4/12/2021 10/28/2020   AP 40 - 150 U/L 69 109 87   TBili 0.2 - 1.3 mg/dL 1.0 0.5 0.6   DBili 0.0 - 0.2 mg/dL - 0.2 -   ALT 0 - 70 U/L 27 30 21   AST 0 - 45 U/L 25 20 21   Tot Protein 6.8 - 8.8 g/dL 7.2 6.6(L) 7.2   Albumin 3.4 - 5.0 g/dL 3.2(L) 3.1(L) 3.3(L)     Pancreas Latest Ref Rng & Units 10/28/2020 6/3/2020 5/28/2020   A1C 0 - 5.6 % 5.8(H) 5.6 -   Lipase 0 - 52 U/L - - 26     Iron studies Latest Ref Rng & Units 1/8/2009   Ferritin 20 - 300 ng/mL 76     [facility] Txp Virology Latest Ref Rng & Units 9/2/2021 11/6/2018 9/12/2017   CVM DNA Quant - - - Plasma, EDTA anticoagulant   CMV Quant <100 Copies/mL - - -   CMV QT Log <2.0 Log copies/mL - - -   CMV QUANT IU/ML Not Detected IU/mL Not Detected - CMV DNA Not Detected   LOG IU/ML OF CMVQNT <2.1 [Log:IU]/mL - - Not Calculated   EBV DNA COPIES/ML EBVNEG:EBV DNA Not Detected [Copies]/mL - EBV DNA Not Detected -   EBV DNA LOG OF COPIES <2.7 [Log:copies]/mL - Not Calculated -   Hep B Core NEG - - -   Hep B Surf - - - -            Recent Labs   Lab Test 09/12/17  0923 11/06/18  0647   DOSMPA 9pm 09.11.2017 Not Provided   MPACID 3.82* 0.55*   MPAG 69.3 29.5*       Again, thank you for allowing me to participate in the care of your patient.      Sincerely,    Nolan Lovett MD

## 2021-09-28 NOTE — LETTER
9/28/2021      RE: Jerad Ross  [address] [place] [address]       TRANSPLANT NEPHROLOGY CHRONIC POST TRANSPLANT VISIT    Assessment & Plan   # DDKT: Stable   - Baseline Creatinine:  ~ 0.7-0.9   - Proteinuria: Minimal (0.2-0.5 grams)   - Date DSA Last Checked: Not Known      Latest DSA: Not checked recently due to time from transplant   - BK Viremia: Not checked recently due to time from transplant   - Kidney Tx Biopsy: No    # Immunosuppression: Mycophenolate mofetil (dose 750 mg every 12 hours) and Prednisone (dose 5 mg daily)   - Continue with intensive monitoring of immunosuppression for efficacy and toxicity.   - Changes: No    # Infection Prophylaxis:   Last CD4 Level: Not checked  - PJP: None    # Hypertension: Controlled;  Goal BP: < 130/80   - Changes: No    # Anemia in Chronic Renal Disease: Hgb: Stable, near normal      CAROL ANN: No   - Iron studies: Not checked recently    # Mineral Bone Disorder:   - Vitamin D; level: Normal        On supplement: Yes  - Calcium; level: Normal        On supplement: Yes    # CAD, s/p CABG: Some increased dyspnea on exertion, possibly an angina equivalent.  Patient was evaluated with cardiac stress test, which was abnormal.  He underwent coronary angiogram 9/2021 that showed some possible obstructive disease in the 1st diagonal, but unable to stent it.  Bypass grafts were open.  Plan is for medical management.  Patient is followed closely by Cardiology.    # Chronic Hepatitis B: Stable on entecavir.    # Asthma: Some increase in shortness of breath, but felt more cardiac in origin.  Patient is stable on inhalers.    # JULIANE: Patient is unable to use CPAP.   - Recommend follow up with Sleep Clinic.    # GERD: Asymptomatic on pantoprazole, but with h/o ulcer, will continue on medication.    # Skin Cancer: New lesions: none   - Discussed sun protection and recommend regular follow up with Dermatology.    # Medical Compliance: Yes    # COVID-19 Virus Review: Discussed  COVID-19 virus and the potential medical risks.  Reviewed preventative health recommendations, including wearing a mask where appropriate.  Recommended COVID vaccination should be up to date with either an initial vaccination or booster shot when appropriate.  Asked the patient to inform the transplant center if they are exposed or diagnosed with this virus.    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  Significant transplant-related complications: None    Transplant Office Phone Number: [phone]    Assessment and plan was discussed with the patient and he voiced his understanding and agreement.    Return visit: Return in about 1 year (around 9/28/2022).    Nolan Lovett MD    Chief Complaint   Mr. Ross is a 61 year old here for kidney transplant and immunosuppression management.    History of Present Illness     reports feeling good overall with some medical complaints.  Since last clinic visit, patient reports no hospitalizations, but has had some new medical complaints.  He had a significant upper respiratory infection in July, which included some increased shortness of breath and a couple of episodes of chest pain.  Patient tried to treat his symptoms with sublingual NTG, but no improvement.  Patient does have a history of CAD, s/p CABG.  Due to these symptoms, patient underwent a cardiac stress test, which was abnormal.  He then had a coronary angiogram done last week that showed significant coronary disease with possible concern for a 90% stenosis in 1st diagonal.  Unfortunately, this lesion could not be stented.  His bypass grafts were open.  The plan by Cardiology was for medical management.    His energy level has been okay  to maybe a bit decreased.  He is still active, although really has gotten minimal exercise since his URI in July.  No chest pain, but just the increased shortness of breath with exertion, as well as tiring easy.  No leg swelling.    Appetite is okay and weight is stable.  No nausea, vomiting or diarrhea.  No heartburn symptoms on PPI.  No fever, sweats or chills.    Home BP: 110-120/70s    Problem List   Patient Active Problem List   Diagnosis     BCC (basal cell carcinoma), trunk     JULIANE (obstructive sleep apnea)     HTN, kidney transplant related     Coronary artery disease involving native coronary artery of native heart without angina pectoris     NSTEMI (non-ST elevated myocardial infarction) (H)     Depression     Diverticulosis     Hemorrhoids     Kidney replaced by transplant     Basal cell carcinoma     Squamous cell carcinoma     Dyslipidemia     CRP elevated     Glaucoma     AION (acute ischemic optic neuropathy)     Paracentral scotoma     Hip pain     Inflamed seborrheic keratosis     Intertrigo     Chronic hepatitis B (H)     Immunosuppression (H)     Hypogonadism in male     GERD (gastroesophageal reflux disease)     Aftercare following organ transplant     Acute midline low back pain without sciatica     Septic bursitis     Impaired mobility     Infection of lumbar spine (H)     S/P CABG (coronary artery bypass graft)     Abnormal cardiovascular stress test     Status post coronary angiogram       Allergies   Allergies   Allergen Reactions     Penicillins Shortness Of Breath and Hives     Keflex [Cephalexin Hcl] Other (See Comments)     Pt could not recall reaction     Tetracycline Other (See Comments)     Patient could not recall reaction     Sulfa Drugs Rash       Medications   Current Outpatient Medications   Medication Sig     acetaminophen (TYLENOL) 325 MG tablet Take 1-2 tablets by mouth every 6 hours as needed.     albuterol (PROAIR HFA) 108 (90 Base) MCG/ACT inhaler Inhale 2 puffs into the  lungs every 6 hours as needed for shortness of breath / dyspnea or wheezing     aspirin 81 MG tablet Take 81 mg by mouth daily      budesonide (PULMICORT FLEXHALER) 90 MCG/ACT inhaler Inhale 2 puffs into the lungs 2 times daily (Patient taking differently: Inhale 2 puffs into the lungs 2 times daily as needed )     calcium citrate-vitamin D (CITRACAL) 315-200 MG-UNIT TABS Take 1 tablet by mouth daily.     entecavir (BARACLUDE) 0.5 MG tablet Take 1 tablet (0.5 mg) by mouth daily     FLUoxetine (PROZAC) 10 MG capsule Take 1 capsule (10 mg) by mouth daily With 40mg daily for a total daily dose of 50mg     FLUoxetine (PROZAC) 40 MG capsule Take 1 capsule (40 mg) by mouth daily     fluticasone-salmeterol (ADVAIR) 250-50 MCG/DOSE inhaler Inhale 1 puff into the lungs every 12 hours (Patient taking differently: Inhale 1 puff into the lungs 2 times daily as needed )     latanoprost (XALATAN) 0.005 % ophthalmic solution Place 1 drop into both eyes At Bedtime     metoprolol tartrate (LOPRESSOR) 25 MG tablet TAKE 1/2 TABLET BY MOUTH TWICE DAILY     Multiple Vitamins-Iron (ONE DAILY MULTIVITAMIN/IRON) TABS Take 1 tablet by mouth daily     mycophenolate (GENERIC EQUIVALENT) 250 MG capsule Take 3 capsules (750 mg) by mouth 2 times daily     nitroGLYcerin (NITROSTAT) 0.4 MG sublingual tablet Place 0.4 mg under the tongue      Omega-3 Fatty Acids (OMEGA 3 PO) Take 1 capsule by mouth daily Dose unknown     pantoprazole (PROTONIX) 40 MG EC tablet Take 1 tablet (40 mg) by mouth daily     polyethylene glycol (MIRALAX) 17 g packet Take 17 g by mouth daily as needed      predniSONE (DELTASONE) 5 MG tablet Take 1 tablet (5 mg) by mouth daily     rosuvastatin (CRESTOR) 20 MG tablet Take 1 tablet (20 mg) by mouth daily     No current facility-administered medications for this visit.     Medications Discontinued During This Encounter   Medication Reason     testosterone (ANDROGEL/TESTIM) 50 MG/5GM (1%) topical gel        Physical Exam   Vital  "Signs: /87 (BP Location: Right arm, Patient Position: Sitting, Cuff Size: Adult Regular)   Pulse 75   Temp 98.2  F (36.8  C) (Oral)   Resp 18   Ht 1.702 m (5' 7\")   Wt 75.3 kg (165 lb 14.4 oz)   SpO2 97%   BMI 25.98 kg/m      GENERAL APPEARANCE: alert and no distress  HENT: mouth without ulcers or lesions  LYMPHATICS: no cervical or supraclavicular nodes  RESP: lungs clear to auscultation - no rales, rhonchi or wheezes  CV: regular rhythm, normal rate, no rub, no murmur  EDEMA: no LE edema bilaterally  ABDOMEN: soft, nondistended, nontender, bowel sounds normal  MS: extremities normal - no gross deformities noted, no evidence of inflammation in joints, no muscle tenderness  SKIN: no rash, actinic keratoses  TX KIDNEY: normal  DIALYSIS ACCESS: none    Data     Renal Latest Ref Rng & Units 9/20/2021 9/13/2021 8/2/2021   Na 136 - 145 mmol/L 142 138 139   K 3.5 - 5.0 mmol/L 4.4 4.0 3.7   Cl 98 - 107 mmol/L 107 101 106   CO2 22 - 31 mmol/L 26 28 28   BUN 8 - 22 mg/dL 13 13 13   Cr 0.70 - 1.30 mg/dL 0.77 0.74 0.82   Glucose 70 - 125 mg/dL 90 85 81   Ca  8.5 - 10.5 mg/dL 9.7 9.9 9.4   Mg 1.8 - 2.6 mg/dL - - -     Bone Health Latest Ref Rng & Units 3/9/2021 10/28/2020 11/5/2018   Phos 2.5 - 4.5 mg/dL - - 2.9   PTHi 12 - 72 pg/mL - - -   Vit D Def 20 - 75 ug/L 49 36 -     Heme Latest Ref Rng & Units 9/20/2021 8/2/2021 4/12/2021   WBC 4.0 - 11.0 10e3/uL 7.6 9.5 9.9   Hgb 13.3 - 17.7 g/dL 13.1(L) 14.3 14.0   Plt 150 - 450 10e3/uL 279 355 310   ABSOLUTE NEUTROPHIL 1.6 - 8.3 10e9/L - - -   ABSOLUTE LYMPHOCYTES 0.8 - 5.3 10e9/L - - -   ABSOLUTE MONOCYTES 0.0 - 1.3 10e9/L - - -   ABSOLUTE EOSINOPHILS 0.0 - 0.7 10e9/L - - -   ABSOLUTE BASOPHILS 0.0 - 0.2 10e9/L - - -   ABS IMMATURE GRANULOCYTES 0 - 0.4 10e9/L - - -   ABSOLUTE NUCLEATED RBC - - - -     Liver Latest Ref Rng & Units 8/2/2021 4/12/2021 10/28/2020   AP 40 - 150 U/L 69 109 87   TBili 0.2 - 1.3 mg/dL 1.0 0.5 0.6   DBili 0.0 - 0.2 mg/dL - 0.2 -   ALT 0 - 70 " U/L 27 30 21   AST 0 - 45 U/L 25 20 21   Tot Protein 6.8 - 8.8 g/dL 7.2 6.6(L) 7.2   Albumin 3.4 - 5.0 g/dL 3.2(L) 3.1(L) 3.3(L)     Pancreas Latest Ref Rng & Units 10/28/2020 6/3/2020 5/28/2020   A1C 0 - 5.6 % 5.8(H) 5.6 -   Lipase 0 - 52 U/L - - 26     Iron studies Latest Ref Rng & Units 1/8/2009   Ferritin 20 - 300 ng/mL 76     [facility] Txp Virology Latest Ref Rng & Units 9/2/2021 11/6/2018 9/12/2017   CVM DNA Quant - - - Plasma, EDTA anticoagulant   CMV Quant <100 Copies/mL - - -   CMV QT Log <2.0 Log copies/mL - - -   CMV QUANT IU/ML Not Detected IU/mL Not Detected - CMV DNA Not Detected   LOG IU/ML OF CMVQNT <2.1 [Log:IU]/mL - - Not Calculated   EBV DNA COPIES/ML EBVNEG:EBV DNA Not Detected [Copies]/mL - EBV DNA Not Detected -   EBV DNA LOG OF COPIES <2.7 [Log:copies]/mL - Not Calculated -   Hep B Core NEG - - -   Hep B Surf - - - -            Recent Labs   Lab Test 09/12/17  0923 11/06/18  0647   DOSMPA 9pm 09.11.2017 Not Provided   MPACID 3.82* 0.55*   MPAG 69.3 29.5*       Nolan Lovett MD

## 2021-09-28 NOTE — NURSING NOTE
"Chief Complaint   Patient presents with     RECHECK     S/P Kidney TX 10/6/1993     Vital signs:  Temp: 98.2  F (36.8  C) Temp src: Oral BP: 124/87 Pulse: 75   Resp: 18 SpO2: 97 %     Height: 170.2 cm (5' 7\") Weight: 75.3 kg (165 lb 14.4 oz)  Estimated body mass index is 25.98 kg/m  as calculated from the following:    Height as of this encounter: 1.702 m (5' 7\").    Weight as of this encounter: 75.3 kg (165 lb 14.4 oz).        Heather Castro, [facility]  9/28/2021 2:08 PM      "

## 2021-10-06 ENCOUNTER — TELEPHONE (OUTPATIENT)
Dept: DERMATOLOGY | Facility: CLINIC | Age: 61
End: 2021-10-06

## 2021-10-12 DIAGNOSIS — E78.00 PURE HYPERCHOLESTEROLEMIA: ICD-10-CM

## 2021-10-12 RX ORDER — ROSUVASTATIN CALCIUM 20 MG/1
20 TABLET, COATED ORAL DAILY
Qty: 90 TABLET | Refills: 0 | Status: SHIPPED | OUTPATIENT
Start: 2021-10-12 | End: 2022-01-11

## 2021-10-18 NOTE — PROGRESS NOTES
TRANSPLANT NEPHROLOGY CHRONIC POST TRANSPLANT VISIT    Assessment & Plan   # DDKT: Stable   - Baseline Creatinine:  ~ 0.7-0.9   - Proteinuria: Minimal (0.2-0.5 grams)   - Date DSA Last Checked: Not Known      Latest DSA: Not checked recently due to time from transplant   - BK Viremia: Not checked recently due to time from transplant   - Kidney Tx Biopsy: No    # Immunosuppression: Mycophenolate mofetil (dose 750 mg every 12 hours) and Prednisone (dose 5 mg daily)   - Continue with intensive monitoring of immunosuppression for efficacy and toxicity.   - Changes: No    # Infection Prophylaxis:   Last CD4 Level: Not checked  - PJP: None    # Hypertension: Controlled;  Goal BP: < 130/80   - Changes: No    # Anemia in Chronic Renal Disease: Hgb: Stable, near normal      CAROL ANN: No   - Iron studies: Not checked recently    # Mineral Bone Disorder:   - Vitamin D; level: Normal        On supplement: Yes  - Calcium; level: Normal        On supplement: Yes    # CAD, s/p CABG: Some increased dyspnea on exertion, possibly an angina equivalent.  Patient was evaluated with cardiac stress test, which was abnormal.  He underwent coronary angiogram 9/2021 that showed some possible obstructive disease in the 1st diagonal, but unable to stent it.  Bypass grafts were open.  Plan is for medical management.  Patient is followed closely by Cardiology.    # Chronic Hepatitis B: Stable on entecavir.    # Asthma: Some increase in shortness of breath, but felt more cardiac in origin.  Patient is stable on inhalers.    # JULIANE: Patient is unable to use CPAP.   - Recommend follow up with Sleep Clinic.    # GERD: Asymptomatic on pantoprazole, but with h/o ulcer, will continue on medication.    # Skin Cancer: New lesions: none   - Discussed sun protection and recommend regular follow up with Dermatology.    # Medical Compliance: Yes    # COVID-19 Virus Review: Discussed COVID-19 virus and the potential medical risks.  Reviewed preventative health  recommendations, including wearing a mask where appropriate.  Recommended COVID vaccination should be up to date with either an initial vaccination or booster shot when appropriate.  Asked the patient to inform the transplant center if they are exposed or diagnosed with this virus.    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  Significant transplant-related complications: None    Transplant Office Phone Number: [phone]    Assessment and plan was discussed with the patient and he voiced his understanding and agreement.    Return visit: Return in about 1 year (around 9/28/2022).    Nolan Lovett MD    Chief Complaint   Mr. Ross is a 61 year old here for kidney transplant and immunosuppression management.    History of Present Illness     reports feeling good overall with some medical complaints.  Since last clinic visit, patient reports no hospitalizations, but has had some new medical complaints.  He had a significant upper respiratory infection in July, which included some increased shortness of breath and a couple of episodes of chest pain.  Patient tried to treat his symptoms with sublingual NTG, but no improvement.  Patient does have a history of CAD, s/p CABG.  Due to these symptoms, patient underwent a cardiac stress test, which was abnormal.  He then had a coronary angiogram done last week that showed significant coronary disease with possible concern for a 90% stenosis in 1st diagonal.  Unfortunately, this lesion could not be stented.  His bypass grafts were open.  The plan by Cardiology was for medical management.    His energy level has been okay to maybe a bit decreased.  He is still active, although really has gotten  minimal exercise since his URI in July.  No chest pain, but just the increased shortness of breath with exertion, as well as tiring easy.  No leg swelling.    Appetite is okay and weight is stable.  No nausea, vomiting or diarrhea.  No heartburn symptoms on PPI.  No fever, sweats or chills.    Home BP: 110-120/70s    Problem List   Patient Active Problem List   Diagnosis     BCC (basal cell carcinoma), trunk     JULIANE (obstructive sleep apnea)     HTN, kidney transplant related     Coronary artery disease involving native coronary artery of native heart without angina pectoris     NSTEMI (non-ST elevated myocardial infarction) (H)     Depression     Diverticulosis     Hemorrhoids     Kidney replaced by transplant     Basal cell carcinoma     Squamous cell carcinoma     Dyslipidemia     CRP elevated     Glaucoma     AION (acute ischemic optic neuropathy)     Paracentral scotoma     Hip pain     Inflamed seborrheic keratosis     Intertrigo     Chronic hepatitis B (H)     Immunosuppression (H)     Hypogonadism in male     GERD (gastroesophageal reflux disease)     Aftercare following organ transplant     Acute midline low back pain without sciatica     Septic bursitis     Impaired mobility     Infection of lumbar spine (H)     S/P CABG (coronary artery bypass graft)     Abnormal cardiovascular stress test     Status post coronary angiogram       Allergies   Allergies   Allergen Reactions     Penicillins Shortness Of Breath and Hives     Keflex [Cephalexin Hcl] Other (See Comments)     Pt could not recall reaction     Tetracycline Other (See Comments)     Patient could not recall reaction     Sulfa Drugs Rash       Medications   Current Outpatient Medications   Medication Sig     acetaminophen (TYLENOL) 325 MG tablet Take 1-2 tablets by mouth every 6 hours as needed.     albuterol (PROAIR HFA) 108 (90 Base) MCG/ACT inhaler Inhale 2 puffs into the lungs every 6 hours as needed for shortness of breath / dyspnea or wheezing      aspirin 81 MG tablet Take 81 mg by mouth daily      budesonide (PULMICORT FLEXHALER) 90 MCG/ACT inhaler Inhale 2 puffs into the lungs 2 times daily (Patient taking differently: Inhale 2 puffs into the lungs 2 times daily as needed )     calcium citrate-vitamin D (CITRACAL) 315-200 MG-UNIT TABS Take 1 tablet by mouth daily.     entecavir (BARACLUDE) 0.5 MG tablet Take 1 tablet (0.5 mg) by mouth daily     FLUoxetine (PROZAC) 10 MG capsule Take 1 capsule (10 mg) by mouth daily With 40mg daily for a total daily dose of 50mg     FLUoxetine (PROZAC) 40 MG capsule Take 1 capsule (40 mg) by mouth daily     fluticasone-salmeterol (ADVAIR) 250-50 MCG/DOSE inhaler Inhale 1 puff into the lungs every 12 hours (Patient taking differently: Inhale 1 puff into the lungs 2 times daily as needed )     latanoprost (XALATAN) 0.005 % ophthalmic solution Place 1 drop into both eyes At Bedtime     metoprolol tartrate (LOPRESSOR) 25 MG tablet TAKE 1/2 TABLET BY MOUTH TWICE DAILY     Multiple Vitamins-Iron (ONE DAILY MULTIVITAMIN/IRON) TABS Take 1 tablet by mouth daily     mycophenolate (GENERIC EQUIVALENT) 250 MG capsule Take 3 capsules (750 mg) by mouth 2 times daily     nitroGLYcerin (NITROSTAT) 0.4 MG sublingual tablet Place 0.4 mg under the tongue      Omega-3 Fatty Acids (OMEGA 3 PO) Take 1 capsule by mouth daily Dose unknown     pantoprazole (PROTONIX) 40 MG EC tablet Take 1 tablet (40 mg) by mouth daily     polyethylene glycol (MIRALAX) 17 g packet Take 17 g by mouth daily as needed      predniSONE (DELTASONE) 5 MG tablet Take 1 tablet (5 mg) by mouth daily     rosuvastatin (CRESTOR) 20 MG tablet Take 1 tablet (20 mg) by mouth daily     No current facility-administered medications for this visit.     Medications Discontinued During This Encounter   Medication Reason     testosterone (ANDROGEL/TESTIM) 50 MG/5GM (1%) topical gel        Physical Exam   Vital Signs: /87 (BP Location: Right arm, Patient Position: Sitting, Cuff  "Size: Adult Regular)   Pulse 75   Temp 98.2  F (36.8  C) (Oral)   Resp 18   Ht 1.702 m (5' 7\")   Wt 75.3 kg (165 lb 14.4 oz)   SpO2 97%   BMI 25.98 kg/m      GENERAL APPEARANCE: alert and no distress  HENT: mouth without ulcers or lesions  LYMPHATICS: no cervical or supraclavicular nodes  RESP: lungs clear to auscultation - no rales, rhonchi or wheezes  CV: regular rhythm, normal rate, no rub, no murmur  EDEMA: no LE edema bilaterally  ABDOMEN: soft, nondistended, nontender, bowel sounds normal  MS: extremities normal - no gross deformities noted, no evidence of inflammation in joints, no muscle tenderness  SKIN: no rash, actinic keratoses  TX KIDNEY: normal  DIALYSIS ACCESS: none    Data     Renal Latest Ref Rng & Units 9/20/2021 9/13/2021 8/2/2021   Na 136 - 145 mmol/L 142 138 139   K 3.5 - 5.0 mmol/L 4.4 4.0 3.7   Cl 98 - 107 mmol/L 107 101 106   CO2 22 - 31 mmol/L 26 28 28   BUN 8 - 22 mg/dL 13 13 13   Cr 0.70 - 1.30 mg/dL 0.77 0.74 0.82   Glucose 70 - 125 mg/dL 90 85 81   Ca  8.5 - 10.5 mg/dL 9.7 9.9 9.4   Mg 1.8 - 2.6 mg/dL - - -     Bone Health Latest Ref Rng & Units 3/9/2021 10/28/2020 11/5/2018   Phos 2.5 - 4.5 mg/dL - - 2.9   PTHi 12 - 72 pg/mL - - -   Vit D Def 20 - 75 ug/L 49 36 -     Heme Latest Ref Rng & Units 9/20/2021 8/2/2021 4/12/2021   WBC 4.0 - 11.0 10e3/uL 7.6 9.5 9.9   Hgb 13.3 - 17.7 g/dL 13.1(L) 14.3 14.0   Plt 150 - 450 10e3/uL 279 355 310   ABSOLUTE NEUTROPHIL 1.6 - 8.3 10e9/L - - -   ABSOLUTE LYMPHOCYTES 0.8 - 5.3 10e9/L - - -   ABSOLUTE MONOCYTES 0.0 - 1.3 10e9/L - - -   ABSOLUTE EOSINOPHILS 0.0 - 0.7 10e9/L - - -   ABSOLUTE BASOPHILS 0.0 - 0.2 10e9/L - - -   ABS IMMATURE GRANULOCYTES 0 - 0.4 10e9/L - - -   ABSOLUTE NUCLEATED RBC - - - -     Liver Latest Ref Rng & Units 8/2/2021 4/12/2021 10/28/2020   AP 40 - 150 U/L 69 109 87   TBili 0.2 - 1.3 mg/dL 1.0 0.5 0.6   DBili 0.0 - 0.2 mg/dL - 0.2 -   ALT 0 - 70 U/L 27 30 21   AST 0 - 45 U/L 25 20 21   Tot Protein 6.8 - 8.8 g/dL 7.2 " 6.6(L) 7.2   Albumin 3.4 - 5.0 g/dL 3.2(L) 3.1(L) 3.3(L)     Pancreas Latest Ref Rng & Units 10/28/2020 6/3/2020 5/28/2020   A1C 0 - 5.6 % 5.8(H) 5.6 -   Lipase 0 - 52 U/L - - 26     Iron studies Latest Ref Rng & Units 1/8/2009   Ferritin 20 - 300 ng/mL 76     [facility] Txp Virology Latest Ref Rng & Units 9/2/2021 11/6/2018 9/12/2017   CVM DNA Quant - - - Plasma, EDTA anticoagulant   CMV Quant <100 Copies/mL - - -   CMV QT Log <2.0 Log copies/mL - - -   CMV QUANT IU/ML Not Detected IU/mL Not Detected - CMV DNA Not Detected   LOG IU/ML OF CMVQNT <2.1 [Log:IU]/mL - - Not Calculated   EBV DNA COPIES/ML EBVNEG:EBV DNA Not Detected [Copies]/mL - EBV DNA Not Detected -   EBV DNA LOG OF COPIES <2.7 [Log:copies]/mL - Not Calculated -   Hep B Core NEG - - -   Hep B Surf - - - -            Recent Labs   Lab Test 09/12/17  0923 11/06/18  0647   DOSMPA 9pm 09.11.2017 Not Provided   MPACID 3.82* 0.55*   MPAG 69.3 29.5*

## 2021-10-25 ENCOUNTER — TELEPHONE (OUTPATIENT)
Dept: CARDIOLOGY | Facility: CLINIC | Age: 61
End: 2021-10-25

## 2021-10-25 ENCOUNTER — OFFICE VISIT (OUTPATIENT)
Dept: CARDIOLOGY | Facility: CLINIC | Age: 61
End: 2021-10-25
Payer: COMMERCIAL

## 2021-10-25 VITALS
BODY MASS INDEX: 26.31 KG/M2 | WEIGHT: 168 LBS | DIASTOLIC BLOOD PRESSURE: 62 MMHG | RESPIRATION RATE: 16 BRPM | HEART RATE: 68 BPM | SYSTOLIC BLOOD PRESSURE: 110 MMHG

## 2021-10-25 DIAGNOSIS — E78.5 DYSLIPIDEMIA: ICD-10-CM

## 2021-10-25 DIAGNOSIS — I25.10 CORONARY ARTERY DISEASE INVOLVING NATIVE CORONARY ARTERY OF NATIVE HEART WITHOUT ANGINA PECTORIS: Primary | ICD-10-CM

## 2021-10-25 DIAGNOSIS — I15.1 HTN, KIDNEY TRANSPLANT RELATED: ICD-10-CM

## 2021-10-25 DIAGNOSIS — Z95.1 S/P CABG (CORONARY ARTERY BYPASS GRAFT): ICD-10-CM

## 2021-10-25 DIAGNOSIS — G47.33 OSA (OBSTRUCTIVE SLEEP APNEA): ICD-10-CM

## 2021-10-25 DIAGNOSIS — Z94.0 KIDNEY REPLACED BY TRANSPLANT: ICD-10-CM

## 2021-10-25 DIAGNOSIS — Z94.0 HTN, KIDNEY TRANSPLANT RELATED: ICD-10-CM

## 2021-10-25 PROBLEM — Z98.890 STATUS POST CORONARY ANGIOGRAM: Status: RESOLVED | Noted: 2021-09-20 | Resolved: 2021-10-25

## 2021-10-25 LAB — BNP SERPL-MCNC: 124 PG/ML (ref 0–51)

## 2021-10-25 PROCEDURE — 36415 COLL VENOUS BLD VENIPUNCTURE: CPT | Performed by: INTERNAL MEDICINE

## 2021-10-25 PROCEDURE — 83880 ASSAY OF NATRIURETIC PEPTIDE: CPT | Performed by: INTERNAL MEDICINE

## 2021-10-25 PROCEDURE — 99214 OFFICE O/P EST MOD 30 MIN: CPT | Performed by: INTERNAL MEDICINE

## 2021-10-25 RX ORDER — ISOSORBIDE MONONITRATE 30 MG/1
30 TABLET, EXTENDED RELEASE ORAL DAILY
Qty: 10 TABLET | Refills: 1 | Status: SHIPPED | OUTPATIENT
Start: 2021-10-25 | End: 2021-11-05

## 2021-10-25 NOTE — PATIENT INSTRUCTIONS
Mr Metcalfnn LAUREL Ross,  I enjoyed visiting with you again today.  I am glad to hear you are doing well.  Per our conversation I will check the blood test looking to see if you are retaining water and need a diuretic.  I will plan on seeing you 1 year or sooner if needed.  Sascha Lundberg

## 2021-10-25 NOTE — PROGRESS NOTES
[facility]  Heart Care Clinic Follow-up Note    Assessment & Plan        (I25.10) Coronary artery disease involving native coronary artery of native heart without angina pectoris  (primary encounter diagnosis)  Comment: Recent angiography secondary to abnormal nuclear stress test showed normal left main, mid LAD 95% lesion with a first diagonal 90% lesion.  The circumflex had a distal 75% lesion with a first obtuse marginal artery 99% stenosis.  The right coronary arteries a proximal 90% followed by mid 99% followed by PDA 80% lesion.  We will try some Imdur for shortness of breath.    (Z95.1) S/P CABG (coronary artery bypass graft)  Comment: June 8, 2020 patient underwent coronary bypass grafting with a LIMA to the LAD, vein graft to the right PDA, and vein graft to the right posterior lateral.  Driving restrictions and lifting restrictions were released.  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  Given this, pharmacological stress nuclear and was abnormal prompting above angiography showing no need for revascularization. Try Imdur.      (Z94.0) Kidney replaced by transplant  Comment: Creatinine stable at this point time.  If needs diuretics would run by his nephrologist Dr. Lovett.    (G47.33) JULIANE (obstructive sleep apnea)  Comment: No CPAP.    (E78.5) Dyslipidemia  Comment: Cholesterol excellent at 133 with an LDL of 62.    Essential hypertension-under good control currently.    Plan  1. try Imdur 30 mg a day to see if it helps with shortness of breath and if so give him a 90-day supply.  2.  Check BNP and if elevated will start diuretic in concert with nephrology.  3.  Okay for an occasional cigar once a month.  4.  Exercise.  5.  Follow-up me 1 year or sooner if needed.    Subjective  CC: 61-year-old white gentleman here for follow-up visit.  I am seeing him post angiography.  He does admit to shortness of breath with a cough and  heavy activity.  No PND, orthopnea, chest discomfort, palpitations, syncope, dizziness or peripheral edema.    Medications  Current Outpatient Medications   Medication Sig Dispense Refill     acetaminophen (TYLENOL) 325 MG tablet Take 1-2 tablets by mouth every 6 hours as needed.       albuterol (PROAIR HFA) 108 (90 Base) MCG/ACT inhaler Inhale 2 puffs into the lungs every 6 hours as needed for shortness of breath / dyspnea or wheezing 1 Inhaler 2     aspirin 81 MG tablet Take 81 mg by mouth daily        budesonide (PULMICORT FLEXHALER) 90 MCG/ACT inhaler Inhale 2 puffs into the lungs 2 times daily (Patient taking differently: Inhale 2 puffs into the lungs 2 times daily as needed ) 1 each 11     calcium citrate-vitamin D (CITRACAL) 315-200 MG-UNIT TABS Take 1 tablet by mouth daily.       entecavir (BARACLUDE) 0.5 MG tablet Take 1 tablet (0.5 mg) by mouth daily 90 tablet 3     FLUoxetine (PROZAC) 10 MG capsule Take 1 capsule (10 mg) by mouth daily With 40mg daily for a total daily dose of 50mg 90 capsule 0     FLUoxetine (PROZAC) 40 MG capsule Take 1 capsule (40 mg) by mouth daily 90 capsule 0     fluticasone-salmeterol (ADVAIR) 250-50 MCG/DOSE inhaler Inhale 1 puff into the lungs every 12 hours (Patient taking differently: Inhale 1 puff into the lungs 2 times daily as needed ) 60 Inhaler 11     isosorbide mononitrate (IMDUR) 30 MG 24 hr tablet Take 1 tablet (30 mg) by mouth daily 10 tablet 1     latanoprost (XALATAN) 0.005 % ophthalmic solution Place 1 drop into both eyes At Bedtime 2.5 mL 4     metoprolol tartrate (LOPRESSOR) 25 MG tablet TAKE 1/2 TABLET BY MOUTH TWICE DAILY       Multiple Vitamins-Iron (ONE DAILY MULTIVITAMIN/IRON) TABS Take 1 tablet by mouth daily       mycophenolate (GENERIC EQUIVALENT) 250 MG capsule Take 3 capsules (750 mg) by mouth 2 times daily 540 capsule 3     nitroGLYcerin (NITROSTAT) 0.4 MG sublingual tablet Place 0.4 mg under the tongue        Omega-3 Fatty Acids (OMEGA 3 PO) Take 1  capsule by mouth daily Dose unknown       pantoprazole (PROTONIX) 40 MG EC tablet Take 1 tablet (40 mg) by mouth daily 90 tablet 2     polyethylene glycol (MIRALAX) 17 g packet Take 17 g by mouth daily as needed        predniSONE (DELTASONE) 5 MG tablet Take 1 tablet (5 mg) by mouth daily 90 tablet 3     rosuvastatin (CRESTOR) 20 MG tablet Take 1 tablet (20 mg) by mouth daily 90 tablet 0       Objective  /62 (BP Location: Right arm, Patient Position: Sitting, Cuff Size: Adult Regular)   Pulse 68   Resp 16   Wt 76.2 kg (168 lb)   BMI 26.31 kg/m      General Appearance:    Alert, cooperative, no distress, appears stated age   Head:    Normocephalic, without obvious abnormality, atraumatic   Throat:   Lips, mucosa, and tongue normal; teeth and gums normal   Neck:   Supple, symmetrical, trachea midline, no adenopathy;        thyroid:  No enlargement/tenderness/nodules; no carotid    bruit or JVD   Back:     Symmetric, no curvature, ROM normal, no CVA tenderness   Lungs:     Clear to auscultation bilaterally, respirations unlabored   Chest wall:    No tenderness, midline sternotomy scar   Heart:    Regular rate and rhythm, S1 and S2 normal, no murmur, rub   or gallop   Abdomen:     Soft, non-tender, bowel sounds active all four quadrants,     no masses, no organomegaly   Extremities:   Normal, atraumatic, no cyanosis or edema   Pulses:   2+ and symmetric all extremities   Skin:   Skin color, texture, turgor normal, no rashes or lesions     Results    Lab Results personally reviewed   Lab Results   Component Value Date    CHOL 133 09/20/2021    CHOL 154 10/28/2020     Lab Results   Component Value Date    HDL 49 09/20/2021    HDL 65 10/28/2020     No components found for: LDLCALC  Lab Results   Component Value Date    TRIG 108 09/20/2021    TRIG 127 10/28/2020     Lab Results   Component Value Date    WBC 7.6 09/20/2021    HGB 13.1 (L) 09/20/2021    HCT 42.8 09/20/2021     09/20/2021     Lab Results    Component Value Date    BUN 13 09/20/2021     09/20/2021    CO2 26 09/20/2021

## 2021-10-25 NOTE — LETTER
10/25/2021    Aston Perry MD  [address] [address]    RE: Jerad Ross       Dear Colleague,    I had the pleasure of seeing Jerad Ross in the [facility].        [facility]  Heart Care Clinic Follow-up Note    Assessment & Plan        (I25.10) Coronary artery disease involving native coronary artery of native heart without angina pectoris  (primary encounter diagnosis)  Comment: Recent angiography secondary to abnormal nuclear stress test showed normal left main, mid LAD 95% lesion with a first diagonal 90% lesion.  The circumflex had a distal 75% lesion with a first obtuse marginal artery 99% stenosis.  The right coronary arteries a proximal 90% followed by mid 99% followed by PDA 80% lesion.  We will try some Imdur for shortness of breath.    (Z95.1) S/P CABG (coronary artery bypass graft)  Comment: June 8, 2020 patient underwent coronary bypass grafting with a LIMA to the LAD, vein graft to the right PDA, and vein graft to the right posterior lateral.  Driving restrictions and lifting restrictions were released.  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  Given this, pharmacological stress nuclear and was abnormal prompting above angiography showing no need for revascularization. Try Imdur.      (Z94.0) Kidney replaced by transplant  Comment: Creatinine stable at this point time.  If needs diuretics would run by his nephrologist Dr. Lovett.    (G47.33) JULIANE (obstructive sleep apnea)  Comment: No CPAP.    (E78.5) Dyslipidemia  Comment: Cholesterol excellent at 133 with an LDL of 62.    Essential hypertension-under good control currently.    Plan  1. try Imdur 30 mg a day to see if it helps with shortness of breath and if so give him a 90-day supply.  2.  Check BNP and if elevated will start diuretic in concert with nephrology.  3.  Okay for an  occasional cigar once a month.  4.  Exercise.  5.  Follow-up me 1 year or sooner if needed.    Subjective  CC: 61-year-old white gentleman here for follow-up visit.  I am seeing him post angiography.  He does admit to shortness of breath with a cough and heavy activity.  No PND, orthopnea, chest discomfort, palpitations, syncope, dizziness or peripheral edema.    Medications  Current Outpatient Medications   Medication Sig Dispense Refill     acetaminophen (TYLENOL) 325 MG tablet Take 1-2 tablets by mouth every 6 hours as needed.       albuterol (PROAIR HFA) 108 (90 Base) MCG/ACT inhaler Inhale 2 puffs into the lungs every 6 hours as needed for shortness of breath / dyspnea or wheezing 1 Inhaler 2     aspirin 81 MG tablet Take 81 mg by mouth daily        budesonide (PULMICORT FLEXHALER) 90 MCG/ACT inhaler Inhale 2 puffs into the lungs 2 times daily (Patient taking differently: Inhale 2 puffs into the lungs 2 times daily as needed ) 1 each 11     calcium citrate-vitamin D (CITRACAL) 315-200 MG-UNIT TABS Take 1 tablet by mouth daily.       entecavir (BARACLUDE) 0.5 MG tablet Take 1 tablet (0.5 mg) by mouth daily 90 tablet 3     FLUoxetine (PROZAC) 10 MG capsule Take 1 capsule (10 mg) by mouth daily With 40mg daily for a total daily dose of 50mg 90 capsule 0     FLUoxetine (PROZAC) 40 MG capsule Take 1 capsule (40 mg) by mouth daily 90 capsule 0     fluticasone-salmeterol (ADVAIR) 250-50 MCG/DOSE inhaler Inhale 1 puff into the lungs every 12 hours (Patient taking differently: Inhale 1 puff into the lungs 2 times daily as needed ) 60 Inhaler 11     isosorbide mononitrate (IMDUR) 30 MG 24 hr tablet Take 1 tablet (30 mg) by mouth daily 10 tablet 1     latanoprost (XALATAN) 0.005 % ophthalmic solution Place 1 drop into both eyes At Bedtime 2.5 mL 4     metoprolol tartrate (LOPRESSOR) 25 MG tablet TAKE 1/2 TABLET BY MOUTH TWICE DAILY       Multiple Vitamins-Iron (ONE DAILY MULTIVITAMIN/IRON) TABS Take 1 tablet by mouth  daily       mycophenolate (GENERIC EQUIVALENT) 250 MG capsule Take 3 capsules (750 mg) by mouth 2 times daily 540 capsule 3     nitroGLYcerin (NITROSTAT) 0.4 MG sublingual tablet Place 0.4 mg under the tongue        Omega-3 Fatty Acids (OMEGA 3 PO) Take 1 capsule by mouth daily Dose unknown       pantoprazole (PROTONIX) 40 MG EC tablet Take 1 tablet (40 mg) by mouth daily 90 tablet 2     polyethylene glycol (MIRALAX) 17 g packet Take 17 g by mouth daily as needed        predniSONE (DELTASONE) 5 MG tablet Take 1 tablet (5 mg) by mouth daily 90 tablet 3     rosuvastatin (CRESTOR) 20 MG tablet Take 1 tablet (20 mg) by mouth daily 90 tablet 0       Objective  /62 (BP Location: Right arm, Patient Position: Sitting, Cuff Size: Adult Regular)   Pulse 68   Resp 16   Wt 76.2 kg (168 lb)   BMI 26.31 kg/m      General Appearance:    Alert, cooperative, no distress, appears stated age   Head:    Normocephalic, without obvious abnormality, atraumatic   Throat:   Lips, mucosa, and tongue normal; teeth and gums normal   Neck:   Supple, symmetrical, trachea midline, no adenopathy;        thyroid:  No enlargement/tenderness/nodules; no carotid    bruit or JVD   Back:     Symmetric, no curvature, ROM normal, no CVA tenderness   Lungs:     Clear to auscultation bilaterally, respirations unlabored   Chest wall:    No tenderness, midline sternotomy scar   Heart:    Regular rate and rhythm, S1 and S2 normal, no murmur, rub   or gallop   Abdomen:     Soft, non-tender, bowel sounds active all four quadrants,     no masses, no organomegaly   Extremities:   Normal, atraumatic, no cyanosis or edema   Pulses:   2+ and symmetric all extremities   Skin:   Skin color, texture, turgor normal, no rashes or lesions     Results    Lab Results personally reviewed   Lab Results   Component Value Date    CHOL 133 09/20/2021    CHOL 154 10/28/2020     Lab Results   Component Value Date    HDL 49 09/20/2021    HDL 65 10/28/2020     No components  found for: LDLCALC  Lab Results   Component Value Date    TRIG 108 09/20/2021    TRIG 127 10/28/2020     Lab Results   Component Value Date    WBC 7.6 09/20/2021    HGB 13.1 (L) 09/20/2021    HCT 42.8 09/20/2021     09/20/2021     Lab Results   Component Value Date    BUN 13 09/20/2021     09/20/2021    CO2 26 09/20/2021               Thank you for allowing me to participate in the care of your patient.      Sincerely,     RENNY KING MD     [facility]  cc:   No referring provider defined for this encounter.

## 2021-10-25 NOTE — TELEPHONE ENCOUNTER
----- Message from Raquel Govea sent at 10/25/2021  1:28 PM CDT -----  Regarding: LBF PATIENT  General phone call:    Caller: ELENA FROM [facility]    Primary cardiologist: AKIL    Detailed reason for call: QUESTIONING QUANTITY OF RX SENT. PLEASE ADDRESS, isosorbide mononitrate (IMDUR) 30 MG 24 hr tablet    Best phone number: [phone]    Best time to contact: ANY    Ok to leave a detailedmessage? YES          Called [facility] pharmacy. They state that the patient has been on this back in August 2020 but has not filled since. Called Jerad and discussed with him. He believes it may have been stopped for low blood pressure. He will try the 10 day supply and update on his blood pressure next week. [place]          [place]? NO    Additional Info:              Noted message. AKIL sent in 10 day supply for patient to trial it. Called [facility]

## 2021-10-26 ENCOUNTER — TELEPHONE (OUTPATIENT)
Dept: CARDIOLOGY | Facility: CLINIC | Age: 61
End: 2021-10-26
Payer: COMMERCIAL

## 2021-10-26 DIAGNOSIS — R06.09 DYSPNEA ON EXERTION: Primary | ICD-10-CM

## 2021-10-26 DIAGNOSIS — R06.09 DYSPNEA ON EXERTION: ICD-10-CM

## 2021-10-26 DIAGNOSIS — Z95.1 S/P CABG (CORONARY ARTERY BYPASS GRAFT): ICD-10-CM

## 2021-10-26 RX ORDER — FUROSEMIDE 20 MG
20 TABLET ORAL DAILY
Qty: 30 TABLET | Refills: 1 | Status: SHIPPED | OUTPATIENT
Start: 2021-10-26 | End: 2021-10-26

## 2021-10-26 RX ORDER — FUROSEMIDE 20 MG
20 TABLET ORAL DAILY
Qty: 90 TABLET | Refills: 1 | Status: SHIPPED | OUTPATIENT
Start: 2021-10-26 | End: 2022-04-05

## 2021-10-26 NOTE — TELEPHONE ENCOUNTER
----- Message from Sascha Lundberg MD sent at 10/26/2021 10:21 AM CDT -----  Contact him and let him know discussed with his kidney doc and would like to try lasix 20 a day and see if sob improves and if so needs renal profile repeat in a week.  LF        Orders placed. Called Jerad and updated him. He will get his Rx mailed to him and will start it approx Thursday. He prefers a clinic lab draw at . Msg sent to schedulers to please arrange. -dilma

## 2021-11-05 ENCOUNTER — TELEPHONE (OUTPATIENT)
Dept: CARDIOLOGY | Facility: CLINIC | Age: 61
End: 2021-11-05
Payer: MEDICARE

## 2021-11-05 DIAGNOSIS — Z95.1 S/P CABG (CORONARY ARTERY BYPASS GRAFT): ICD-10-CM

## 2021-11-05 DIAGNOSIS — I25.10 CORONARY ARTERY DISEASE INVOLVING NATIVE CORONARY ARTERY OF NATIVE HEART WITHOUT ANGINA PECTORIS: ICD-10-CM

## 2021-11-05 RX ORDER — ISOSORBIDE MONONITRATE 30 MG/1
30 TABLET, EXTENDED RELEASE ORAL DAILY
Qty: 90 TABLET | Refills: 0 | Status: SHIPPED | OUTPATIENT
Start: 2021-11-05 | End: 2022-01-27

## 2021-11-05 NOTE — TELEPHONE ENCOUNTER
----- Message from Lissett Manning V sent at 11/5/2021 11:11 AM CDT -----  Patient has already contacted their pharmacy. The medication or refill issue is below:    Ordering Cardiologist: FERNANDO  Medication: ISOSORBIDE  Issue / Concern: Pt got a sample of it and owuld like to keep taking it.  Would like a 90 day prescription  Preferred Pharmacy/City: [facility]  Best Phone Number for Patient: [phone]    Additional Info:          Called patient to get update on how Isosorbide has been helping. Left message for him to call writer back. 90 day supply sent in to pharmacy above. -[facility]

## 2021-11-10 ENCOUNTER — LAB (OUTPATIENT)
Dept: CARDIOLOGY | Facility: CLINIC | Age: 61
End: 2021-11-10
Payer: COMMERCIAL

## 2021-11-10 DIAGNOSIS — R06.09 DYSPNEA ON EXERTION: ICD-10-CM

## 2021-11-10 LAB
ANION GAP SERPL CALCULATED.3IONS-SCNC: 11 MMOL/L (ref 5–18)
BUN SERPL-MCNC: 11 MG/DL (ref 8–22)
CALCIUM SERPL-MCNC: 9.5 MG/DL (ref 8.5–10.5)
CHLORIDE BLD-SCNC: 104 MMOL/L (ref 98–107)
CO2 SERPL-SCNC: 28 MMOL/L (ref 22–31)
CREAT SERPL-MCNC: 0.73 MG/DL (ref 0.7–1.3)
GFR SERPL CREATININE-BSD FRML MDRD: >90 ML/MIN/1.73M2
GLUCOSE BLD-MCNC: 69 MG/DL (ref 70–125)
POTASSIUM BLD-SCNC: 4 MMOL/L (ref 3.5–5)
SODIUM SERPL-SCNC: 143 MMOL/L (ref 136–145)

## 2021-11-10 PROCEDURE — 36415 COLL VENOUS BLD VENIPUNCTURE: CPT

## 2021-11-10 PROCEDURE — 80048 BASIC METABOLIC PNL TOTAL CA: CPT

## 2021-11-10 NOTE — TELEPHONE ENCOUNTER
Received a call back from Jerad. He states he is tolerating both the Isosorbide and Furosemide 20 mg daily without any adverse effects. He denies any dizziness or lightheadedness or symptoms of hypotension. Will update LBF. We also reviewed stable renal profile and plan for follow up in 1 year. -[facility] [place]

## 2021-11-16 DIAGNOSIS — H40.003 GLAUCOMA SUSPECT, BOTH EYES: Primary | ICD-10-CM

## 2021-11-30 ENCOUNTER — TELEPHONE (OUTPATIENT)
Dept: OPHTHALMOLOGY | Facility: CLINIC | Age: 61
End: 2021-11-30
Payer: MEDICARE

## 2021-11-30 NOTE — TELEPHONE ENCOUNTER
Called / LVM for Jerad to call me directly to make an appt with a glaucoma doctor.    Jerad would like a Friday appt but we don't have a glaucoma provider on a Friday.     Radha Al Communication Facilitator on 11/30/2021 at 9:50 AM

## 2021-12-01 ENCOUNTER — TELEPHONE (OUTPATIENT)
Dept: OPHTHALMOLOGY | Facility: CLINIC | Age: 61
End: 2021-12-01
Payer: MEDICARE

## 2021-12-01 NOTE — TELEPHONE ENCOUNTER
Called and spoke to Jerad in regards to an appt with Dr. Miller for a Glaucoma 2/ 2 @ 1215 pm. Mailed out a new pt packet with time, date and a map.     Radha Al Communication Facilitator on 12/1/2021 at 9:41 AM

## 2021-12-03 ENCOUNTER — VIRTUAL VISIT (OUTPATIENT)
Dept: PSYCHIATRY | Facility: CLINIC | Age: 61
End: 2021-12-03
Attending: NURSE PRACTITIONER
Payer: COMMERCIAL

## 2021-12-03 DIAGNOSIS — F33.41 RECURRENT MAJOR DEPRESSIVE DISORDER, IN PARTIAL REMISSION (H): ICD-10-CM

## 2021-12-03 PROCEDURE — 99442 PR PHYSICIAN TELEPHONE EVALUATION 11-20 MIN: CPT | Mod: 95 | Performed by: NURSE PRACTITIONER

## 2021-12-03 RX ORDER — FLUOXETINE 40 MG/1
40 CAPSULE ORAL DAILY
Qty: 90 CAPSULE | Refills: 0 | Status: SHIPPED | OUTPATIENT
Start: 2021-12-03 | End: 2022-02-04

## 2021-12-03 RX ORDER — FLUOXETINE 10 MG/1
10 CAPSULE ORAL DAILY
Qty: 90 CAPSULE | Refills: 0 | Status: SHIPPED | OUTPATIENT
Start: 2021-12-03 | End: 2022-02-04
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
## 2021-12-07 DIAGNOSIS — H40.053 BORDERLINE GLAUCOMA WITH OCULAR HYPERTENSION, BILATERAL: ICD-10-CM

## 2021-12-07 RX ORDER — LATANOPROST 50 UG/ML
1 SOLUTION/ DROPS OPHTHALMIC AT BEDTIME
Qty: 2.5 ML | Refills: 4 | Status: SHIPPED | OUTPATIENT
Start: 2021-12-07 | End: 2022-02-02

## 2021-12-07 NOTE — TELEPHONE ENCOUNTER
latanoprost (XALATAN) 0.005 % ophthalmic solution  Last Written Prescription Date:  8/18/2020  Last Fill Quantity: 2.5,   # refills: 4  Last Office Visit : 3/8/2021  Future Office visit:  2/2/2022     Anthony Vargas MD    Ophthalmology     Assessment & Plan   Assessment & Plan         Jerad Ross is a 60 year old male with the following diagnoses:   1. History of anterior ischemic optic neuropathy    2. Glaucoma suspect, both eyes       (Z86.69) History of anterior ischemic optic neuropathy  (primary encounter diagnosis)  Comment: Long-standing NAION.  Stable paracentral and inferior scotoma's from prior; VA stable    Plan:   - Continue to monitor  - Discussed the importance of good BP control     (H40.003) Glaucoma suspect, both eyes  Glaucoma suspect based upon age, IOP, C/D asymmetry.  Right eye poor vision likely due to AION overlay and remains stable.  Left eye visual field stable to improved since 2019 with good IOP control s/p SLT 7/2020 left eye.    - IOP today:  14/14 (improved left eye since SLT 7/2020)  - Tmax:  26/22  - IOP target: Normotension  - Pachy:  624/622  - C/D:  0.2/0.65 pale  - Gonio:  Needs  - Trauma history:  Negative  - VF:  LVC right eye generalized depression (stable), 24-2 left eye high false negatives but overall stable from 2019/2020.  Consider trial of LVC due to FN errors  - RNFL: Right eye diffuse thinning; left eye diffuse thinning (stable)  - FH:  Negative  - Continue latanoprost at bedtime each eye   - Completed 's field form today (qualifies for no restrictions based on left eye field and acuity)     - RTC Glaucoma 6 months IOP, LVC OU  - RTC General for VTD + MRx in 1 year     Patient disposition:   Return in about 6 months (around 9/8/2021) for 6 months Glaucoma with Dr. Franco, consider Vision, Pressure, LVC OU.     2.5 mL, 4 Refills sent to pharm 12/7/2021      Mireille Pacheco RN  Central Triage Red Flags/Med Refills

## 2021-12-28 ENCOUNTER — VIRTUAL VISIT (OUTPATIENT)
Dept: FAMILY MEDICINE | Facility: CLINIC | Age: 61
End: 2021-12-28
Payer: COMMERCIAL

## 2021-12-28 DIAGNOSIS — Z20.822 EXPOSURE TO 2019 NOVEL CORONAVIRUS: Primary | ICD-10-CM

## 2021-12-28 PROCEDURE — 99213 OFFICE O/P EST LOW 20 MIN: CPT | Mod: 95 | Performed by: PHYSICIAN ASSISTANT

## 2021-12-28 NOTE — PATIENT INSTRUCTIONS
"  Patient Education   After Your COVID-19 (Coronavirus) Test  You have been tested for COVID-19 (coronavirus).   If you'll have surgery in the next few days, we'll let you know ahead of time if you have the virus. Please call your surgeon's office with any questions.  For all other patients: Results are usually available in [employer]visity within 2 to 3 days.   If you do not have a Advisity account, you'll get a letter in the mail in about 7 to 10 days.   Seriouslyhart is often the fastest way to get test results. Please sign up if you do not already have a Advisity account. See the handout Getting COVID-19 Test Results in Advisity for help.  What if my test result is positive?  If your test is positive and you have not viewed your result in Advisity, you'll get a phone call with your result. (A positive test means that you have the virus.)     Follow the tips under \"How do I self-isolate?\" below for 10 days (20 days if you have a weak immune system).    You don't need to be retested for COVID-19 before going back to school or work. As long as you're fever-free and feeling better, you can go back to school, work and other activities after waiting the 10 or 20 days.  What if I have questions after I get my results?  If you have questions about your results, please visit our testing website at [email or web address].   After 7 to 10 days, if you have not gotten your results:     Call [phone] ([phone]) and ask to speak with our COVID-19 results team.    If you're being treated at an infusion center: Call your infusion center directly.  What are the symptoms of COVID-19?  Cough, fever and trouble breathing are the most common signs of COVID-19.  Other symptoms can include new headaches, new muscle or body aches, new and unexplained fatigue (feeling very tired), chills, sore throat, congestion (stuffy or runny nose), diarrhea (loose poop), loss of taste or smell, belly pain, and nausea or vomiting " "(feeling sick to your stomach or throwing up).  You may already have symptoms of COVID-19, or they may show up later.  What should I do if I have symptoms?  If you're having surgery: Call your surgeon's office.  For all other patients: Stay home and away from others (self-isolate) until ...    You've had no fever--and no medicine that reduces fever--for 1 full day (24 hours), AND    Other symptoms have gotten better. For example, your cough or breathing has improved, AND    At least 10 days have passed since your symptoms first started.  How do I self-isolate?    Stay in your own room, even for meals. Use your own bathroom if you can.    Stay away from others in your home. No hugging, kissing or shaking hands. No visitors.    Don't go to work, school or anywhere else.    Clean \"high touch\" surfaces often (doorknobs, counters, handles). Use household cleaning spray or wipes. You'll find a full list of  on the EPA website: www.epa.gov/pesticide-registration/list-n-disinfectants-use-against-sars-cov-2.    Cover your mouth and nose with a mask or other face covering to avoid spreading germs.    Wash your hands and face often. Use soap and water.    Caregivers in these groups are at risk for severe illness due to COVID-19:  ? People 65 years and older  ? People who live in a nursing home or long-term care facility  ? People with chronic disease (lung, heart, cancer, diabetes, kidney, liver, immunologic)  ? People who have a weakened immune system, including those who:    Are in cancer treatment    Take medicine that weakens the immune system, such as corticosteroids    Had a bone marrow or organ transplant    Have an immune deficiency    Have poorly controlled HIV or AIDS    Are obese (body mass index of 40 or higher)    Smoke regularly    Caregivers should wear gloves while washing dishes, handling laundry and cleaning bedrooms and bathrooms.    Use caution when washing and drying laundry: Don't shake dirty " laundry and use the warmest water setting that you can.    For more tips on managing your health at home, go to www.cdc.gov/coronavirus/2019-ncov/downloads/10Things.pdf.  How can I take care of myself at home?  1. Get lots of rest. Drink extra fluids (unless a doctor has told you not to).  2. Take Tylenol (acetaminophen) for fever or pain. If you have liver or kidney problems, ask your family doctor if it's OK to take Tylenol.   Adults can take either:  ? 650 mg (two 325 mg pills) every 4 to 6 hours, or   ? 1,000 mg (two 500 mg pills) every 8 hours as needed.  ? Note: Don't take more than 3,000 mg in one day. Acetaminophen is found in many medicines (both prescribed and over-the-counter medicines). Read all labels to be sure you don't take too much.   For children, check the Tylenol bottle for the right dose. The dose is based on the child's age or weight.  3. If you have other health problems (like cancer, heart failure, an organ transplant or severe kidney disease): Call your specialty clinic if you don't feel better in the next 2 days.  4. Know when to call 911. Emergency warning signs include:  ? Trouble breathing or shortness of breath  ? Chest pain or pressure that doesn't go away  ? Feeling confused like you haven't felt before, or not being able to wake up  ? Bluish-colored lips or face  5. If your doctor prescribed a blood thinner medicine: Follow their instructions.  Where can I get more information?    [facility] - About COVID-19:   [email or web address]    CDC - If You're Sick: cdc.gov/coronavirus/2019-ncov/about/steps-when-sick.html    CDC - Ending Home Isolation: www.cdc.gov/coronavirus/2019-ncov/hcp/disposition-in-home-patients.html    CDC - Caring for Someone: www.cdc.gov/coronavirus/2019-ncov/if-you-are-sick/care-for-someone.html    [facility] - Interim Guidance for Hospital Discharge to Home: www.health.[facility].[place]/diseases/coronavirus/hcp/hospdischarge.pdf    [facility]  clinical trials (COVID-19 research studies): [email or web address]-clinical-trials    Below are the COVID-19 hotlines at the [place] Department of Health ([facility]). Interpreters are available.  ? For health questions: Call 154-262-2975 or 1-249.195.5006 (7 a.m. to 7 p.m.)  ? For questions about schools and childcare: Call 220-877-8353 or 1-320.694.1949 (7 a.m. to 7 p.m.)    For informational purposes only. Not to replace the advice of your health care provider. Clinically reviewed by Infection Prevention and the [facility] COVID-19 Clinical Team. Copyright   2020 [facility]. All rights reserved. [employer] 731922 - Rev 11/11/20.

## 2021-12-28 NOTE — PROGRESS NOTES
Jerad is a 61 year old who is being evaluated via a billable telephone visit.      What phone number would you like to be contacted at? [phone]  How would you [facility] to [facility]? [facility]    Assessment & Plan     Exposure to 2019 novel coronavirus    Test ordered so that monoclonal antibodies could be initiated if positive.    - Symptomatic; Yes; 12/26/2021 COVID-19 Virus (Coronavirus) by PCR Nose; Future               There are no Patient Instructions on file for this visit.    No follow-ups on file.    Cj Rudd PA-C  [facility]    Garrett Mars is a 61 year old who presents for the following health issues     HPI       Concern for COVID-19  About how many days ago did these symptoms start? 1 MONTH- cold symptoms- has been worsening over the past few days  Is this your first visit for this illness? Yes  In the 14 days before your symptoms started, have you had close contact with someone with COVID-19 (Coronavirus)? Yes, I have been in contact with someone who has COVID-19/Coronavirus (confirmed by lab test).- house mate tested positive a week ago  Do you have a fever or chills? No  Are you having new or worsening difficulty breathing? No  Do you have new or worsening cough? Yes, it's a dry cough.   Have you had any new or unexplained body aches? YES  - and has been very fatigued  Have you experienced any of the following NEW symptoms?    Headache: YES    Sore throat: No    Loss of taste or smell: YES    Chest pain: No    Diarrhea: No    Rash: No  What treatments have you tried? none  Who do you live with? Roommate   Are you, or a household member, a healthcare worker or a ? No  Do you live in a nursing home, group home, or shelter? No  Do you have a way to get food/medications if quarantined? Yes, I have a friend or family member who can help me.      Post kidney transplant so he is immunocompromised and wondering about possible treatment.                Review of Systems   Constitutional, HEENT, cardiovascular, pulmonary, gi and gu systems are negative, except as otherwise noted.      Objective           Vitals:  No vitals were obtained today due to virtual visit.    Physical Exam   healthy, alert and no distress  PSYCH: Alert and oriented times 3; coherent speech, normal   rate and volume, able to articulate logical thoughts, able   to abstract reason, no tangential thoughts, no hallucinations   or delusions  His affect is normal and pleasant  RESP: No cough, no audible wheezing, able to talk in full sentences  Remainder of exam unable to be completed due to telephone visits                Phone call duration: 5 minutes

## 2021-12-30 ENCOUNTER — LAB (OUTPATIENT)
Dept: FAMILY MEDICINE | Facility: CLINIC | Age: 61
End: 2021-12-30
Attending: PHYSICIAN ASSISTANT
Payer: COMMERCIAL

## 2021-12-30 DIAGNOSIS — Z20.822 EXPOSURE TO 2019 NOVEL CORONAVIRUS: ICD-10-CM

## 2021-12-30 LAB — SARS-COV-2 RNA RESP QL NAA+PROBE: NEGATIVE

## 2021-12-30 PROCEDURE — U0003 INFECTIOUS AGENT DETECTION BY NUCLEIC ACID (DNA OR RNA); SEVERE ACUTE RESPIRATORY SYNDROME CORONAVIRUS 2 (SARS-COV-2) (CORONAVIRUS DISEASE [COVID-19]), AMPLIFIED PROBE TECHNIQUE, MAKING USE OF HIGH THROUGHPUT TECHNOLOGIES AS DESCRIBED BY CMS-2020-01-R: HCPCS

## 2021-12-30 PROCEDURE — U0005 INFEC AGEN DETEC AMPLI PROBE: HCPCS

## 2022-01-12 DIAGNOSIS — R06.2 WHEEZING: Primary | ICD-10-CM

## 2022-01-24 NOTE — PROGRESS NOTES
[facility] Dermatology Note      Dermatology Problem List:  1.History of kidney transplant 10/6/1993  - Immunosuppression: cyclosporine, imuran, prednisone   2. History of BCC - mid chest x 2, right axilla x 1 s/p excision   3. Inflamed seborrheic and verrucous keratoses s/p cryo  4. Intertrigo - hydrocortisone 2.5% ointment  5. Filiform warts - left lateral eyelid, left alteral canthus, left temple s/p cryo 3/15/17    Encounter Date: Dec 4, 2018    CC:  No chief complaint on file.        History of Present Illness:  Mr. Jerad Ross is a 58 year old male who presents for a transplant skin check. He was last seen on 3/15/17 when 4 filiform warts were treated with cryotherapy and 4 irritated SKs were also treated with cryotherapy. Today, the patient notes multiple lesions of concern on his arms that bleed occasionally and that he has been scratching. He also notes a lesion of concern on his left flank and concern about the nail on his right 3rd finger that he would like evaluated. The patient voices no other concerns.        Past Medical History:   Patient Active Problem List   Diagnosis     BCC (basal cell carcinoma), trunk     JULIANE (obstructive sleep apnea)     Hypertension secondary to other renal disorders     Coronary artery disease involving native coronary artery of native heart without angina pectoris     NSTEMI (non-ST elevated myocardial infarction) (H)     Depression     Diverticulosis     Hemorrhoids     Kidney replaced by transplant     Basal cell carcinoma     Squamous cell carcinoma     Dyslipidemia     CRP elevated     Glaucoma     AION (acute ischemic optic neuropathy)     Paracentral scotoma     Hip pain     Inflamed seborrheic keratosis     Intertrigo     Chronic hepatitis B (H)     Immunosuppressed status (H)     Hypogonadism in male     GERD (gastroesophageal reflux disease)     Aftercare following organ transplant     Acute midline low back pain without sciatica      None Septic bursitis     Impaired mobility     Infection of lumbar spine (H)     Past Medical History:   Diagnosis Date     AION (acute ischemic optic neuropathy)      Anemia in chronic renal disease      Avascular necrosis of bones of both hips (H)     s/p bilateral hip replacements     Basal cell carcinoma      Chronic hepatitis B (H)      Clostridium difficile colitis      Coronary artery disease involving native coronary artery of native heart without angina pectoris 6/17/2014    Coronary angiogram 6/17/14: Severe distal 3-vessel disease involving small vessels, not amenable to PCI or CABG.     CRP elevated      Depression      Diverticulosis      Dyslipidemia      FSGS (focal segmental glomerulosclerosis)      Gastric ulcer with hemorrhage 2/12/12     GERD (gastroesophageal reflux disease)      Glaucoma     OHTN     Hemorrhoids      Hypertension secondary to other renal disorders      Hypogonadism in male      Immunosuppressed status (H)      Kidney replaced by transplant     focal glomerulosclerosis      NSTEMI (non-ST elevated myocardial infarction) (H) 6/17/2014     JULIANE (obstructive sleep apnea)     Doesn't use CPAP     Paracentral scotoma     LE     Secondary renal hyperparathyroidism (H)      Squamous cell carcinoma      Past Surgical History:   Procedure Laterality Date     APPENDECTOMY       CATARACT IOL, RT/LT  4/19/2000    RE     CATARACT IOL, RT/LT  6/1/2000    LE     COLECTOMY SUBTOTAL  1983    10 cm, diverticulitis     COLONOSCOPY  2/13/2012    Procedure:COLONOSCOPY; Surgeon:SLOAN GALLARDO; Location: GI     ESOPHAGOSCOPY, GASTROSCOPY, DUODENOSCOPY (EGD), COMBINED  2/13/2012    Procedure:COMBINED ESOPHAGOSCOPY, GASTROSCOPY, DUODENOSCOPY (EGD); Surgeon:SLOAN GALLARDO; Location:[facility]     EXTRACAPSULAR CATARACT EXTRATION WITH INTRAOCULAR LENS IMPLANT  4-20-10, 6-1-10    Rt, Lt     HERNIA REPAIR  1995    Lt inguinal     HIP SURGERY      1981, bilat MITZI, revised 2001, 2005      KIDNEY SURGERY  1978 and 1993    transplant     KNEE SURGERY  1983, 1987    bilat TKA     MOHS MICROGRAPHIC PROCEDURE       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, auxiliary spleen     TONSILLECTOMY         Social History:  Patient reports that he quit smoking about 30 years ago. He has a 9.00 pack-year smoking history. He has quit using smokeless tobacco. He reports that he drinks alcohol. He reports that he does not use illicit drugs.    Family History:  Family History   Problem Relation Age of Onset     Cardiovascular Father      AI with valve repair     Hypertension Father      KIDNEY DISEASE Father      Cancer Paternal Grandmother      ovarian ca     Cerebrovascular Disease Paternal Grandfather      Cerebrovascular Disease Maternal Grandfather      KIDNEY DISEASE Paternal Aunt      Skin Cancer No family hx of      Glaucoma No family hx of      Macular Degeneration No family hx of      Amblyopia No family hx of        Medications:  Current Outpatient Prescriptions   Medication Sig Dispense Refill     acetaminophen (TYLENOL) 325 MG tablet Take 1-2 tablets by mouth every 6 hours as needed.       albuterol (PROAIR HFA) 108 (90 Base) MCG/ACT Inhaler Inhale 2 puffs into the lungs every 6 hours as needed for shortness of breath / dyspnea or wheezing 1 Inhaler 2     amLODIPine (NORVASC) 5 MG tablet Take 1 tablet (5 mg) by mouth daily 90 tablet 1     aspirin 81 MG tablet Take 81 mg by mouth daily        atorvastatin (LIPITOR) 20 MG tablet Take 1 tablet (20 mg) by mouth daily 90 tablet 1     BETA BLOCKER NOT PRESCRIBED, INTENTIONAL, Beta Blocker not prescribed intentionally due to Bradycardia < 50 bpm without beta blocker therapy  0     BUDESONIDE, INHALATION, 90 MCG/ACT AEPB Inhale 2 puffs into the lungs 2 times daily 1 each 3     calcium citrate-vitamin D (CITRACAL) 315-200 MG-UNIT TABS Take 1 tablet by mouth daily.       cefTRIAXone (ROCEPHIN) 2 GM vial Inject 2 g into the vein every 24 hours 10 each      CHOLECALCIFEROL PO  Take 1 tablet by mouth daily Dose unknown       clopidogrel (PLAVIX) 75 MG tablet Take 1 tablet (75 mg) by mouth daily 90 tablet 1     docusate sodium (COLACE) 100 MG capsule Take 1 capsule (100 mg) by mouth 2 times daily 60 capsule      entecavir (BARACLUDE) 0.5 MG tablet Take 1 tablet (0.5 mg) by mouth daily 90 tablet 3     FLUoxetine (PROZAC) 40 MG capsule Take 1 capsule (40 mg) by mouth every morning 90 capsule 1     fluticasone (FLONASE) 50 MCG/ACT spray Spray 1-2 sprays into both nostrils daily 3 Bottle 11     fluticasone-salmeterol (ADVAIR) 250-50 MCG/DOSE diskus inhaler Inhale 1 puff into the lungs every 12 hours 60 Inhaler 1     heparin lock flush 10 UNIT/ML SOLN injection 2-5 mLs by Intracatheter route once as needed for line flush (for locking each dormant lumen with line placement)       isosorbide mononitrate (IMDUR) 30 MG 24 hr tablet Take 0.5 tablets (15 mg) by mouth daily 45 tablet 1     latanoprost (XALATAN) 0.005 % ophthalmic solution Place 1 drop into both eyes At Bedtime 3 Bottle 3     Multiple Vitamins-Iron (ONE DAILY MULTIVITAMIN/IRON) TABS Take 1 tablet by mouth daily       mycophenolate (GENERIC EQUIVALENT) 250 MG capsule Take 3 capsules (750 mg) by mouth 2 times daily 180 capsule 11     Omega-3 Fatty Acids (OMEGA 3 PO) Take 1 capsule by mouth daily Dose unknown       omeprazole (PRILOSEC OTC) 20 MG tablet Take  by mouth daily. 30 tablet      oxyCODONE IR (ROXICODONE) 5 MG tablet Take 1-2 tablets (5-10 mg) by mouth every 6 hours as needed for moderate to severe pain 19 tablet 0     polyethylene glycol (MIRALAX/GLYCOLAX) Packet Take 17 g by mouth daily 7 packet      predniSONE (DELTASONE) 5 MG tablet Take 1 tablet (5 mg) by mouth daily 90 tablet 3       Allergies   Allergen Reactions     Penicillins Shortness Of Breath and Hives     Keflex [Cephalexin Hcl] Other (See Comments)     Pt could not recall reaction     Tetracycline Other (See Comments)     Patient could not recall reaction     Sulfa  Drugs Rash       Review of Systems:  -As per HPI  -Constitutional: Otherwise feeling well today, in usual state of health.  -HEENT: Patient denies nonhealing oral sores.  -Skin: As above in HPI. No additional skin concerns.    Physical exam:  Vitals: There were no vitals taken for this visit.  GEN: This is a well developed, well-nourished male in no acute distress, in a pleasant mood.    SKIN: Full skin, which includes the head/face, both arms, chest, back, abdomen,both legs, genitalia and/or groin buttocks, digits and/or nails, was examined.  -There are 2 3mm verrucous papules on the L and R forehead papillomatous architecture      -There is sebaceous hyperplasia of the forehead  -8mm verrucous plaque on the left knee  -Within the superior end of the TKA scar on L leg, there is a 1.2 cm freely mobile cystic subcutaneous nodule most consistent with and epidermal inclusion cyst   -There are numerous waxy stuck on tan to brown to black papules on the trunk and extremities.  -R 3rd finger nail has dystrophic nail plate with horizontal band, and the nail bed has hyperkeratosis and scarring   -No other lesions of concern on areas examined.       Impression/Plan:  1. Filiform Warts    Cryotherapy procedure note: After verbal consent and discussion of risks and benefits including but no limited to dyspigmentation/scar, blister, and pain, 3 was(were) treated with 1-2mm freeze border for 2 cycles with liquid nitrogen. Post cryotherapy instructions were provided.    2. Neoplasm of uncertain behavior on the left neck. The differential diagnosis includes AK vs SCC.     Shave biopsy:  After discussion of benefits and risks including but not limited to bleeding/bruising, pain/swelling, infection, scar, incomplete removal, nerve damage/numbness, recurrence, and non-diagnostic biopsy, written consent, verbal consent and photographs were obtained. Time-out was performed. The area was cleaned with isopropyl alcohol. 0.5ml of 1%  lidocaine with 1:100,000 epinephrine was injected to obtain adequate anesthesia. A shave biopsy was performed. Hemostasis was achieved with aluminium chloride. Vaseline and a sterile dressing were applied. The patient tolerated the procedure and no complications were noted. The patient was provided with verbal and written post care instructions.    3. Neoplasm of uncertain behavior on the left lateral forearm. The differential diagnosis includes VK vs AK vs SCC.     Shave biopsy:  After discussion of benefits and risks including but not limited to bleeding/bruising, pain/swelling, infection, scar, incomplete removal, nerve damage/numbness, recurrence, and non-diagnostic biopsy, written consent, verbal consent and photographs were obtained. Time-out was performed. The area was cleaned with isopropyl alcohol. 0.5ml of 1% lidocaine with 1:100,000 epinephrine was injected to obtain adequate anesthesia. A shave biopsy was performed. Hemostasis was achieved with aluminium chloride. Vaseline and a sterile dressing were applied. The patient tolerated the procedure and no complications were noted. The patient was provided with verbal and written post care instructions.    4. Seborrheic keratosis, non irritated    No further intervention required. Patient to report changes.     5.   Beau's line right third fingernail- suspected due to trauma or inflammation of the proximal nail fold     Recommended Urea 40% cream    Follow-up in 5 months, earlier for new or changing lesions.     Staff Involved:  Scribe/Staff    Scribe Disclosure  I, Dominic Najjar, am serving as a scribe to document services personally performed by Dr. Foster De Guzman MD, based on data collection and the provider's statements to me.     Staff attestation:  The documentation recorded by the scribe accurately reflects the services I personally performed and the decisions I personally made. I have made edits where needed.    Foster De Guzman MD  Staff  Dermatologist and Dermatopathologist  , Department of Dermatology

## 2022-01-25 DIAGNOSIS — Z94.0 KIDNEY TRANSPLANTED: ICD-10-CM

## 2022-01-26 RX ORDER — MYCOPHENOLATE MOFETIL 250 MG/1
750 CAPSULE ORAL 2 TIMES DAILY
Qty: 540 CAPSULE | Refills: 3 | Status: SHIPPED | OUTPATIENT
Start: 2022-01-26 | End: 2023-02-16

## 2022-02-02 ENCOUNTER — OFFICE VISIT (OUTPATIENT)
Dept: OPHTHALMOLOGY | Facility: CLINIC | Age: 62
End: 2022-02-02
Attending: OPHTHALMOLOGY
Payer: COMMERCIAL

## 2022-02-02 DIAGNOSIS — H40.053 BORDERLINE GLAUCOMA WITH OCULAR HYPERTENSION, BILATERAL: Primary | ICD-10-CM

## 2022-02-02 DIAGNOSIS — Z86.69 HISTORY OF ANTERIOR ISCHEMIC OPTIC NEUROPATHY: ICD-10-CM

## 2022-02-02 PROCEDURE — 92133 CPTRZD OPH DX IMG PST SGM ON: CPT | Performed by: OPHTHALMOLOGY

## 2022-02-02 PROCEDURE — G0463 HOSPITAL OUTPT CLINIC VISIT: HCPCS | Mod: 25

## 2022-02-02 PROCEDURE — 99214 OFFICE O/P EST MOD 30 MIN: CPT | Mod: GC | Performed by: OPHTHALMOLOGY

## 2022-02-02 PROCEDURE — 92083 EXTENDED VISUAL FIELD XM: CPT | Performed by: OPHTHALMOLOGY

## 2022-02-02 RX ORDER — LATANOPROST 50 UG/ML
1 SOLUTION/ DROPS OPHTHALMIC AT BEDTIME
Qty: 2.5 ML | Refills: 6 | Status: SHIPPED | OUTPATIENT
Start: 2022-02-02 | End: 2022-10-03

## 2022-02-02 ASSESSMENT — TONOMETRY
OS_IOP_MMHG: 19
IOP_METHOD: APPLANATION
OD_IOP_MMHG: 22

## 2022-02-02 ASSESSMENT — EXTERNAL EXAM - RIGHT EYE: OD_EXAM: NORMAL

## 2022-02-02 ASSESSMENT — EXTERNAL EXAM - LEFT EYE: OS_EXAM: NORMAL

## 2022-02-02 ASSESSMENT — CONF VISUAL FIELD
OD_SUPERIOR_TEMPORAL_RESTRICTION: 2
OS_NORMAL: 1
OD_SUPERIOR_NASAL_RESTRICTION: 2
OD_INFERIOR_TEMPORAL_RESTRICTION: 2
OD_INFERIOR_NASAL_RESTRICTION: 2

## 2022-02-02 ASSESSMENT — REFRACTION_WEARINGRX
OS_CYLINDER: +2.25
OS_SPHERE: -0.50
OS_ADD: +2.50
OS_AXIS: 170
SPECS_TYPE: TRIFOCAL
OD_SPHERE: BALANCE

## 2022-02-02 ASSESSMENT — VISUAL ACUITY
OD_PH_CC: 20/100+
METHOD: SNELLEN - LINEAR
OD_SC: 20/125-
OS_CC: 20/25-2
OD_CC: 20/80-

## 2022-02-02 ASSESSMENT — CUP TO DISC RATIO
OS_RATIO: 0.65
OD_RATIO: 0.5

## 2022-02-02 ASSESSMENT — SLIT LAMP EXAM - LIDS
COMMENTS: NORMAL
COMMENTS: NORMAL

## 2022-02-02 NOTE — PROGRESS NOTES
Chief Complaint(s) and History of Present Illness(es)     Glaucoma Follow-Up     Laterality: both eyes    Associated symptoms: floaters (occasional) and flashes (occasional)    Treatment side effects: none    Compliance with Treatment: always    Pain scale: 0/10          Comments     Here for glaucoma follow up.  Pt notes vision is stable since last visit   here, no new concerns.    Latanoprost at bedtime each eye  - LD used around 8:00pm last night     Yessy Ramirez COT 12:31 PM 02/02/2022      Review of systems for the eyes was negative other than the pertinent positives/negatives listed in the HPI.      Assessment & Plan      Jerad Ross is a 62 year old male with the following diagnoses:   1. Borderline glaucoma with ocular hypertension, bilateral    2. History of anterior ischemic optic neuropathy       Glaucoma suspect based upon age, IOP, C/D asymmetry.      Right eye poor vision likely due to NAION overlay and remains stable.  Left eye visual field stable to improved since 2019 with good IOP control s/p SLT 7/2020 left eye.      - Tmax:  22/19  - IOP target: Normotension  - Pachy:  624/622  - C/D:  0.2/0.65 pale  - Gonio:  pigment 1+ mild sampolesi line, open to  ou   - Trauma history:  Negative  - VF:  LVC right eye generalized depression (stable), 24-2 left eye high false negatives but overall stable from 2019/2020.  Consider trial of LVC due to FN errors  - RNFL: Right eye diffuse thinning; left eye diffuse thinning (stable)  - FH:  Negative    Continue Latanoprost at night both eyes  Monitor intraocular pressure closely, consider repeat selected laser trabeculoplasty (SLT) as needed       Patient disposition:   Return in about 6 months (around 8/2/2022) for VT only.    Galina Nicole MD PGY3  Department of Ophthalmology      Attending Physician Attestation:  Complete documentation of historical and exam elements from today's encounter can be found in the full encounter summary report (not  reduplicated in this progress note).  I personally obtained the chief complaint(s) and history of present illness.  I confirmed and edited as necessary the review of systems, past medical/surgical history, family history, social history, and examination findings as documented by others; and I examined the patient myself.  I personally reviewed the relevant tests, images, and reports as documented above.  I formulated and edited as necessary the assessment and plan and discussed the findings and management plan with the patient and family. .Attending Physician Image/Tesing Attestation: I personally reviewed the ophthalmic test(s) associated with this encounter, agree with the interpretation(s) as documented by the resident/fellow, and have edited the corresponding report(s) as necessary.  - Wood Miller MD

## 2022-02-03 ENCOUNTER — TELEPHONE (OUTPATIENT)
Dept: OPHTHALMOLOGY | Facility: CLINIC | Age: 62
End: 2022-02-03
Payer: COMMERCIAL

## 2022-02-03 NOTE — TELEPHONE ENCOUNTER
Called and LVM for Jerad Mars can make an appt with Dr. Bella for Low Vision Eval for next available.     Radha Al Communication Facilitator on 2/3/2022 at 10:45 AM

## 2022-02-04 ENCOUNTER — VIRTUAL VISIT (OUTPATIENT)
Dept: PSYCHIATRY | Facility: CLINIC | Age: 62
End: 2022-02-04
Attending: NURSE PRACTITIONER
Payer: COMMERCIAL

## 2022-02-04 DIAGNOSIS — F33.41 RECURRENT MAJOR DEPRESSIVE DISORDER, IN PARTIAL REMISSION (H): ICD-10-CM

## 2022-02-04 PROCEDURE — 99441 PR PHYSICIAN TELEPHONE EVALUATION 5-10 MIN: CPT | Mod: 95 | Performed by: NURSE PRACTITIONER

## 2022-02-04 RX ORDER — FLUOXETINE 10 MG/1
10 CAPSULE ORAL DAILY
Qty: 90 CAPSULE | Refills: 2 | Status: SHIPPED | OUTPATIENT
Start: 2022-02-04 | End: 2022-08-12

## 2022-02-04 RX ORDER — FLUOXETINE 40 MG/1
40 CAPSULE ORAL DAILY
Qty: 90 CAPSULE | Refills: 2 | Status: SHIPPED | OUTPATIENT
Start: 2022-02-04 | End: 2023-01-06

## 2022-02-04 RX ORDER — FLUOXETINE 10 MG/1
10 CAPSULE ORAL DAILY
Qty: 90 CAPSULE | Refills: 0 | Status: SHIPPED | OUTPATIENT
Start: 2022-02-04 | End: 2022-02-04

## 2022-02-04 RX ORDER — FLUOXETINE 40 MG/1
40 CAPSULE ORAL DAILY
Qty: 90 CAPSULE | Refills: 0 | Status: SHIPPED | OUTPATIENT
Start: 2022-02-04 | End: 2022-02-04
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
## 2022-04-04 DIAGNOSIS — R06.09 DYSPNEA ON EXERTION: ICD-10-CM

## 2022-04-04 DIAGNOSIS — B18.1 CHRONIC HEPATITIS B (H): ICD-10-CM

## 2022-04-04 DIAGNOSIS — B18.1 CHRONIC VIRAL HEPATITIS B WITHOUT DELTA AGENT AND WITHOUT COMA (H): ICD-10-CM

## 2022-04-04 DIAGNOSIS — K21.9 GASTROESOPHAGEAL REFLUX DISEASE WITHOUT ESOPHAGITIS: ICD-10-CM

## 2022-04-05 RX ORDER — FUROSEMIDE 20 MG
TABLET ORAL
Qty: 90 TABLET | Refills: 1 | Status: SHIPPED | OUTPATIENT
Start: 2022-04-05 | End: 2022-09-29

## 2022-04-05 RX ORDER — PANTOPRAZOLE SODIUM 40 MG/1
40 TABLET, DELAYED RELEASE ORAL DAILY
Qty: 90 TABLET | Refills: 1 | Status: SHIPPED | OUTPATIENT
Start: 2022-04-05 | End: 2022-10-03

## 2022-04-06 DIAGNOSIS — B18.1 CHRONIC VIRAL HEPATITIS B WITHOUT DELTA AGENT AND WITHOUT COMA (H): ICD-10-CM

## 2022-04-06 DIAGNOSIS — B18.1 CHRONIC HEPATITIS B (H): ICD-10-CM

## 2022-04-06 DIAGNOSIS — I25.10 CORONARY ARTERY DISEASE INVOLVING NATIVE CORONARY ARTERY OF NATIVE HEART WITHOUT ANGINA PECTORIS: ICD-10-CM

## 2022-04-06 RX ORDER — ENTECAVIR 0.5 MG/1
0.5 TABLET, FILM COATED ORAL DAILY
Qty: 90 TABLET | Refills: 0 | Status: SHIPPED | OUTPATIENT
Start: 2022-04-06 | End: 2022-07-01

## 2022-04-08 RX ORDER — METOPROLOL TARTRATE 25 MG/1
TABLET, FILM COATED ORAL
Qty: 90 TABLET | Refills: 1 | Status: SHIPPED | OUTPATIENT
Start: 2022-04-08 | End: 2022-09-29

## 2022-04-11 DIAGNOSIS — Z48.298 AFTERCARE FOLLOWING ORGAN TRANSPLANT: ICD-10-CM

## 2022-04-11 DIAGNOSIS — Z94.0 KIDNEY TRANSPLANTED: Primary | ICD-10-CM

## 2022-04-11 DIAGNOSIS — Z94.0 KIDNEY REPLACED BY TRANSPLANT: ICD-10-CM

## 2022-04-11 DIAGNOSIS — Z79.899 ENCOUNTER FOR LONG-TERM CURRENT USE OF MEDICATION: ICD-10-CM

## 2022-04-11 RX ORDER — PREDNISONE 5 MG/1
5 TABLET ORAL DAILY
Qty: 90 TABLET | Refills: 0 | Status: SHIPPED | OUTPATIENT
Start: 2022-04-11 | End: 2022-07-05

## 2022-04-14 RX ORDER — ENTECAVIR 0.5 MG/1
TABLET, FILM COATED ORAL
Qty: 90 TABLET | Refills: 3 | OUTPATIENT
Start: 2022-04-14

## 2022-04-16 ENCOUNTER — HEALTH MAINTENANCE LETTER (OUTPATIENT)
Age: 62
End: 2022-04-16

## 2022-04-20 ENCOUNTER — MYC MEDICAL ADVICE (OUTPATIENT)
Dept: INTERNAL MEDICINE | Facility: CLINIC | Age: 62
End: 2022-04-20
Payer: COMMERCIAL

## 2022-04-20 DIAGNOSIS — G47.33 OSA (OBSTRUCTIVE SLEEP APNEA): ICD-10-CM

## 2022-04-20 DIAGNOSIS — L60.2 NAIL HYPERTROPHY: Primary | ICD-10-CM

## 2022-04-20 DIAGNOSIS — R73.02 IGT (IMPAIRED GLUCOSE TOLERANCE): ICD-10-CM

## 2022-06-07 ENCOUNTER — VIRTUAL VISIT (OUTPATIENT)
Dept: ENDOCRINOLOGY | Facility: CLINIC | Age: 62
End: 2022-06-07
Payer: MEDICARE

## 2022-06-07 VITALS — HEIGHT: 67 IN | BODY MASS INDEX: 28.25 KG/M2 | WEIGHT: 180 LBS

## 2022-06-07 DIAGNOSIS — R63.8 WEIGHT DISORDER: Primary | ICD-10-CM

## 2022-06-07 PROCEDURE — 99207 PR NO CHARGE LOS: CPT | Performed by: INTERNAL MEDICINE

## 2022-06-07 ASSESSMENT — PAIN SCALES - GENERAL: PAINLEVEL: NO PAIN (0)

## 2022-06-07 NOTE — LETTER
6/7/2022       RE: Jerad Ross  [address] [place] [address]     Dear Colleague,    Thank you for referring your patient, Jerad Ross, to the [facility] [place] at [facility]. Please see a copy of my visit note below.    Jerad is a 62 year old who is being evaluated via a billable video visit.      How would you like to obtain your AVS? MyChart  If the video visit is dropped, the invitation should be resent by: [phone]  Will anyone else be joining your video visit? No  During this virtual visit the patient is located in [place], patient verifies this as the location during the entirety of this visit.       Not seen, patient did not want the visit today & will follow-up with Dr. Perry.        Again, thank you for allowing me to participate in the care of your patient.      Sincerely,    Nena Velasquez MD

## 2022-06-07 NOTE — LETTER
Date:June 8, 2022      Provider requested that no letter be sent. Do not send.       [facility]

## 2022-06-07 NOTE — PROGRESS NOTES
Jerad is a 62 year old who is being evaluated via a billable video visit.      How would you like to obtain your AVS? Melon Powerhart  If the video visit is dropped, the invitation should be resent by: [phone]  Will anyone else be joining your video visit? No  During this virtual visit the patient is located in [place], patient verifies this as the location during the entirety of this visit.

## 2022-06-07 NOTE — NURSING NOTE
"(   Chief Complaint   Patient presents with     New Patient     New MWM    )    ( Weight: 81.6 kg (180 lb) (Patient reported) )  ( Height: 170.2 cm (5' 7\") )  ( BMI (Calculated): 28.19 )  (   )  (   )  (   )  (   )  (   )  (   )    (   )  (   )  (   )  (   )  (   )  (   )  (   )    (   Patient Active Problem List   Diagnosis     BCC (basal cell carcinoma), trunk     JULIANE (obstructive sleep apnea)     HTN, kidney transplant related     Coronary artery disease involving native coronary artery of native heart without angina pectoris     NSTEMI (non-ST elevated myocardial infarction) (H)     Depression     Diverticulosis     Hemorrhoids     Kidney replaced by transplant     Basal cell carcinoma     Squamous cell carcinoma     Dyslipidemia     CRP elevated     Glaucoma     AION (acute ischemic optic neuropathy)     Paracentral scotoma     Hip pain     Inflamed seborrheic keratosis     Intertrigo     Chronic hepatitis B (H)     Immunosuppression (H)     Hypogonadism in male     GERD (gastroesophageal reflux disease)     Aftercare following organ transplant     Acute midline low back pain without sciatica     Septic bursitis     Impaired mobility     Infection of lumbar spine (H)     S/P CABG (coronary artery bypass graft)     Abnormal cardiovascular stress test    )  (   Current Outpatient Medications   Medication Sig Dispense Refill     acetaminophen (TYLENOL) 325 MG tablet Take 1-2 tablets by mouth every 6 hours as needed.       albuterol (PROAIR HFA) 108 (90 Base) MCG/ACT inhaler Inhale 2 puffs into the lungs every 6 hours as needed for shortness of breath / dyspnea or wheezing 1 Inhaler 2     aspirin 81 MG tablet Take 81 mg by mouth daily        budesonide (PULMICORT FLEXHALER) 90 MCG/ACT inhaler Inhale 2 puffs into the lungs 2 times daily 1 each 8     calcium citrate-vitamin D (CITRACAL) 315-200 MG-UNIT TABS Take 1 tablet by mouth daily.       entecavir (BARACLUDE) 0.5 MG tablet Take 1 tablet (0.5 mg) by mouth daily " APPT NEEDED FOR FURTHER REFILLS 90 tablet 0     FLUoxetine (PROZAC) 10 MG capsule Take 1 capsule (10 mg) by mouth daily With 40mg daily for a total daily dose of 50mg 90 capsule 2     FLUoxetine (PROZAC) 40 MG capsule Take 1 capsule (40 mg) by mouth daily 90 capsule 2     fluticasone-salmeterol (ADVAIR) 250-50 MCG/DOSE inhaler Inhale 1 puff into the lungs every 12 hours (Patient taking differently: Inhale 1 puff into the lungs 2 times daily as needed) 60 Inhaler 11     furosemide (LASIX) 20 MG tablet TAKE 1 TABLET(20 MG) BY MOUTH DAILY 90 tablet 1     isosorbide mononitrate (IMDUR) 30 MG 24 hr tablet TAKE 1 TABLET(30 MG) BY MOUTH DAILY 90 tablet 1     latanoprost (XALATAN) 0.005 % ophthalmic solution Place 1 drop into both eyes At Bedtime 2.5 mL 6     metoprolol tartrate (LOPRESSOR) 25 MG tablet TAKE 1/2 TABLET(12.5 MG) BY MOUTH TWICE DAILY 90 tablet 1     Multiple Vitamins-Iron (ONE DAILY MULTIVITAMIN/IRON) TABS Take 1 tablet by mouth daily       mycophenolate (GENERIC EQUIVALENT) 250 MG capsule Take 3 capsules (750 mg) by mouth 2 times daily 540 capsule 3     nitroGLYcerin (NITROSTAT) 0.4 MG sublingual tablet Place 0.4 mg under the tongue        Omega-3 Fatty Acids (OMEGA 3 PO) Take 1 capsule by mouth daily Dose unknown       pantoprazole (PROTONIX) 40 MG EC tablet Take 1 tablet (40 mg) by mouth daily 90 tablet 1     polyethylene glycol (MIRALAX) 17 g packet Take 17 g by mouth daily as needed        predniSONE (DELTASONE) 5 MG tablet Take 1 tablet (5 mg) by mouth in the morning. 90 tablet 0     rosuvastatin (CRESTOR) 20 MG tablet TAKE 1 TABLET(20 MG) BY MOUTH DAILY 90 tablet 1    )  ( Diabetes Eval:    )    ( Pain Eval:  No Pain (0) )    ( Wound Eval:       )    (   History   Smoking Status     Former Smoker     Packs/day: 1.00     Years: 9.00     Quit date: 1/1/1988   Smokeless Tobacco     Former User    )    ( Signed By:  Aby Pretty, EMT; June 7, 2022; 12:50 PM )

## 2022-06-08 ENCOUNTER — OFFICE VISIT (OUTPATIENT)
Dept: OPHTHALMOLOGY | Facility: CLINIC | Age: 62
End: 2022-06-08
Payer: COMMERCIAL

## 2022-06-08 DIAGNOSIS — H52.4 PRESBYOPIA: ICD-10-CM

## 2022-06-08 DIAGNOSIS — Z86.69 HISTORY OF ANTERIOR ISCHEMIC OPTIC NEUROPATHY: Primary | ICD-10-CM

## 2022-06-08 PROCEDURE — 92012 INTRM OPH EXAM EST PATIENT: CPT | Performed by: OPTOMETRIST

## 2022-06-08 ASSESSMENT — VISUAL ACUITY
METHOD: SNELLEN - LINEAR
CORRECTION_TYPE: GLASSES
OS_CC: 20/25
OD_CC: 20/125
OS_CC+: -2

## 2022-06-08 ASSESSMENT — EXTERNAL EXAM - LEFT EYE: OS_EXAM: NORMAL

## 2022-06-08 ASSESSMENT — EXTERNAL EXAM - RIGHT EYE: OD_EXAM: NORMAL

## 2022-06-08 ASSESSMENT — REFRACTION_WEARINGRX
OS_ADD: +2.50
SPECS_TYPE: TRIFOCAL
OS_SPHERE: -0.50
OS_CYLINDER: +2.25
OD_SPHERE: BALANCE
OS_AXIS: 170

## 2022-06-08 ASSESSMENT — CONF VISUAL FIELD
OD_SUPERIOR_TEMPORAL_RESTRICTION: 2
OD_SUPERIOR_NASAL_RESTRICTION: 2
OD_INFERIOR_NASAL_RESTRICTION: 2
OD_INFERIOR_TEMPORAL_RESTRICTION: 2
OS_NORMAL: 1
METHOD: COUNTING FINGERS

## 2022-06-08 ASSESSMENT — REFRACTION_MANIFEST
OD_SPHERE: BALANCE
OS_SPHERE: -0.50
OS_CYLINDER: +2.25
OS_AXIS: 170

## 2022-06-08 ASSESSMENT — SLIT LAMP EXAM - LIDS
COMMENTS: NORMAL
COMMENTS: NORMAL

## 2022-06-08 ASSESSMENT — TONOMETRY
IOP_METHOD: ICARE
OD_IOP_MMHG: 17
OS_IOP_MMHG: 17

## 2022-06-08 ASSESSMENT — CUP TO DISC RATIO
OS_RATIO: 0.65
OD_RATIO: 0.5

## 2022-06-08 NOTE — NURSING NOTE
Chief Complaints and History of Present Illnesses   Patient presents with     Follow Up     Borderline glaucoma with ocular hypertension, bilateral.     Chief Complaint(s) and History of Present Illness(es)     Follow Up     Laterality: both eyes    Onset: gradual    Onset: years ago    Course: stable    Associated symptoms: floaters.  Negative for glare, haloes, dryness, eye pain, tearing, photophobia and flashes    Treatments tried: eye drops    Pain scale: 0/10    Comments: Borderline glaucoma with ocular hypertension, bilateral.              Comments     Pt states vision is stable since last visit.  Hard to adjust from light to dark.    Latanoprost at night, did not take last night.    SAURAV Mejia June 8, 2022 11:38 AM

## 2022-06-08 NOTE — PROGRESS NOTES
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 Given stable Rx today and good adaptation, patient does not need to update glasses unless he would like to. Return to clinic if vision changes.     (H52.4) Presbyopia  Plan: See above note. Rx updated and released to patient.       Complete documentation of historical and exam elements from today's encounter can  be found in the full encounter summary report (not reduplicated in this progress  note). I personally obtained the chief complaint(s) and history of present illness. I  confirmed and edited as necessary the review of systems, past medical/surgical  history, family history, social history, and examination findings as documented by  others; and I examined the patient myself. I personally reviewed the relevant tests,  images, and reports as documented above. I formulated and edited as necessary the  assessment and plan and discussed the findings and management plan with the  patient and family.    Kenneth Bella, OD

## 2022-07-01 ENCOUNTER — TELEPHONE (OUTPATIENT)
Dept: GASTROENTEROLOGY | Facility: CLINIC | Age: 62
End: 2022-07-01

## 2022-07-01 DIAGNOSIS — Z94.0 KIDNEY TRANSPLANTED: Primary | ICD-10-CM

## 2022-07-01 DIAGNOSIS — B18.1 CHRONIC VIRAL HEPATITIS B WITHOUT DELTA AGENT AND WITHOUT COMA (H): ICD-10-CM

## 2022-07-01 DIAGNOSIS — B18.1 CHRONIC HEPATITIS B (H): ICD-10-CM

## 2022-07-01 RX ORDER — ENTECAVIR 0.5 MG/1
0.5 TABLET, FILM COATED ORAL DAILY
Qty: 90 TABLET | Refills: 0 | Status: SHIPPED | OUTPATIENT
Start: 2022-07-01 | End: 2022-07-22

## 2022-07-01 NOTE — TELEPHONE ENCOUNTER
Left voicemail message letting patient know will send refill for 90 day supply and number to call to schedule follow up with Dr. Claros.    Ebony FINK LPN  Hepatology Clinic       [facility]    Phone Message    May a detailed message be left on voicemail: yes     Reason for Call: Other:       Pt is requesting a call back to discuss if they need an appt to get a refill for entecavir.  Action Taken: Message routed to:  [facility]    Travel Screening: Not Applicable

## 2022-07-05 RX ORDER — PREDNISONE 5 MG/1
5 TABLET ORAL DAILY
Qty: 90 TABLET | Refills: 0 | Status: SHIPPED | OUTPATIENT
Start: 2022-07-05 | End: 2022-10-07

## 2022-07-07 ENCOUNTER — TRANSFERRED RECORDS (OUTPATIENT)
Dept: HEALTH INFORMATION MANAGEMENT | Facility: CLINIC | Age: 62
End: 2022-07-07

## 2022-07-17 NOTE — PROGRESS NOTES
[facility] Dermatology Note  Encounter Date: Jul 19, 2022  Office Visit     Dermatology Problem List:  1.History of kidney transplant 10/6/93  - Immunosuppression: prednisone 5mg, mycophenolate 750 mg BID (previously on cyclosporine, imuran)  2. History of BCC  - Right axilla s/p MMS 4/9/12  3. History of SCC  - Central chest s/p excision 2009  - Unspecified location s/p excision 1999  3. Seborrheic and verrucous keratoses   - Monitor  4. Filiform warts   - left lateral eyelid, left alteral canthus, left temple s/p cryo 3/15/17, 7/19/22  5. Acrochordons   - L anterior neck s/p cryo 10/8/19  6. L thigh epidermal inclusion cyst   - Monitor  7.Prurigo nodularis, left forearm  - s/p cryotherapy 11/10/20    ____________________________________________    Assessment & Plan:    # History of NMSC  No evidence of recurrence on today's exams    # Filiform warts and verruca vulgaris  Scattered warts, suggested cryotherapy and patient agreed. Cryo on 4 lesions, L lateral eyelid, L zygomatic cheek, R zygomatic cheek, R elbow.  - Cryotherapy performed today (see procedure note(s) below).    # Multiple benign lesions: cherry angioma, multiple benign nevi, seborrheic keratoses, acrochordon, verrucous keratoses, filiform wart, verrucous vulgaris   Counseled on benign nature.  - ABCDEs: Counseled ABCDEs of melanoma: Asymmetry, Border (irregularity), Color (not uniform, changes in color), Diameter (greater than 6 mm which is about the size of a pencil eraser), and Evolving (any changes in preexisting moles).  - Sun protection: Counseled SPF30+ sunscreen, UPF clothing, sun avoidance, tanning bed avoidance.      Procedures Performed:   - Cryotherapy procedure note, location(s): L lateral eye, L zygomatic cheek, R zygomatic cheek, R elbow. After verbal consent and discussion of risks and benefits including, but not limited to, dyspigmentation/scar, blister, and pain, 4 lesion(s) was(were) treated with 1-2 mm freeze  border for 1-2 cycles with liquid nitrogen. Post cryotherapy instructions were provided.    Follow-up: 1 year(s) in-person, or earlier for new or changing lesions    Staff and Medical Student:     Scribe Disclosure:  I, Ivet Shelton, am serving as a scribe to prep the notes for Foster De Guzman MD .      Ryan Valdez, MS3  [employer]      Staff Physician:  I was present with the medical student who participated in the service and in the documentation of the note. I have verified the history and personally performed the physical exam and medical decision making. I agree with the assessment and plan of care as documented in the note.     Foster De Guzman MD  Staff Dermatologist and Dermatopathologist  , Department of Dermatology   ____________________________________________    CC: No chief complaint on file.    HPI:  Mr. Jerad Ross is a(n) 62 year old male who presents today as a return patient for FBSE in the context of immunosuppression (prednisone and mycophenolate) for kidney transplantation. Last seen by myself at which point 1 prurigo nodule on the L forearm was treated with cryo.     He does not report any new or changing lesions. He denies any lesions that are painful, pruritic or bleeding. His only complaint is that his fingernails are not growing which makes it challenging to play guitar. This has been a long lasting problem. He has not tried anything.    Patient is otherwise feeling well, without additional skin concerns.    Labs Reviewed:  N/A    Physical Exam:  Vitals: There were no vitals taken for this visit.  SKIN: Full skin, which includes the head/face, both arms, chest, back, abdomen,both legs, genitalia and/or groin buttocks, digits and/or nails, was examined.  - Well demarcated rough papule with long narrow projections on L lateral eyelid  - Well demarcated rough papule with L zygomatic, R zygomatic cheek, R elbow   - There are dome  shaped bright red papules on the trunk.   - Central chest and R axilla biopsy sites there is no erythema, telangectasias, nodularity, or pigmentation.  - There is(are) skin colored pedunculated papules on the neck and trunk.   - Scattered brown macules on sun exposed areas.  - There are waxy stuck on tan to brown papules on the trunk, extremities, face and scalp.  - No other lesions of concern on areas examined.     Medications:  Current Outpatient Medications   Medication     acetaminophen (TYLENOL) 325 MG tablet     albuterol (PROAIR HFA) 108 (90 Base) MCG/ACT inhaler     aspirin 81 MG tablet     budesonide (PULMICORT FLEXHALER) 90 MCG/ACT inhaler     calcium citrate-vitamin D (CITRACAL) 315-200 MG-UNIT TABS     entecavir (BARACLUDE) 0.5 MG tablet     FLUoxetine (PROZAC) 10 MG capsule     FLUoxetine (PROZAC) 40 MG capsule     fluticasone-salmeterol (ADVAIR) 250-50 MCG/DOSE inhaler     furosemide (LASIX) 20 MG tablet     isosorbide mononitrate (IMDUR) 30 MG 24 hr tablet     latanoprost (XALATAN) 0.005 % ophthalmic solution     metoprolol tartrate (LOPRESSOR) 25 MG tablet     Multiple Vitamins-Iron (ONE DAILY MULTIVITAMIN/IRON) TABS     mycophenolate (GENERIC EQUIVALENT) 250 MG capsule     nitroGLYcerin (NITROSTAT) 0.4 MG sublingual tablet     Omega-3 Fatty Acids (OMEGA 3 PO)     pantoprazole (PROTONIX) 40 MG EC tablet     polyethylene glycol (MIRALAX) 17 g packet     predniSONE (DELTASONE) 5 MG tablet     rosuvastatin (CRESTOR) 20 MG tablet     No current facility-administered medications for this visit.      Past Medical History:   Patient Active Problem List   Diagnosis     BCC (basal cell carcinoma), trunk     JULIANE (obstructive sleep apnea)     HTN, kidney transplant related     Coronary artery disease involving native coronary artery of native heart without angina pectoris     NSTEMI (non-ST elevated myocardial infarction) (H)     Depression     Diverticulosis     Hemorrhoids     Kidney replaced by transplant      Basal cell carcinoma     Squamous cell carcinoma     Dyslipidemia     CRP elevated     Glaucoma     AION (acute ischemic optic neuropathy)     Paracentral scotoma     Hip pain     Inflamed seborrheic keratosis     Intertrigo     Chronic hepatitis B (H)     Immunosuppression (H)     Hypogonadism in male     GERD (gastroesophageal reflux disease)     Aftercare following organ transplant     Acute midline low back pain without sciatica     Septic bursitis     Impaired mobility     Infection of lumbar spine (H)     S/P CABG (coronary artery bypass graft)     Abnormal cardiovascular stress test     Past Medical History:   Diagnosis Date     Acute midline low back pain without sciatica      AION (acute ischemic optic neuropathy)      AION (acute ischemic optic neuropathy)      Anemia in chronic renal disease      Anemia in chronic renal disease      Avascular necrosis of bones of both hips (H)     s/p bilateral hip replacements     Avascular necrosis of bones of both hips (H)     s/p bilateral hip replacements     Basal cell carcinoma      Basal cell carcinoma      Chronic hepatitis B (H)      Chronic hepatitis B (H)      Clostridium difficile colitis      Clostridium difficile colitis      Coronary artery disease involving native coronary artery of native heart without angina pectoris 6/17/2014    Coronary angiogram 6/17/14: Severe distal 3-vessel disease involving small vessels, not amenable to PCI or CABG.     Coronary artery disease involving native coronary artery of native heart without angina pectoris 06/17/2014    Coronary angiogram 6/17/14: Severe distal 3-vessel disease involving small vessels, not amenable to PCI or CABG     CRP elevated      Depression      Depression      Diverticulosis      Diverticulosis      Dyslipidemia      Dyslipidemia      Elevated C-reactive protein (CRP)      FSGS (focal segmental glomerulosclerosis)      FSGS (focal segmental glomerulosclerosis)      Gastric ulcer with  hemorrhage 2/12/12     Gastric ulcer with hemorrhage 02/12/2012     GERD (gastroesophageal reflux disease)      GERD (gastroesophageal reflux disease)      Glaucoma     OHTN     Glaucoma      Hemorrhoids      Hemorrhoids      HTN (hypertension)      Hyperlipidemia      Hypertension secondary to other renal disorders      Hypogonadism in male      Hypogonadism in male      Immunosuppressed status (H)      Kidney replaced by transplant     focal glomerulosclerosis      Kidney transplanted     focal glomerulosclerosis      NSTEMI (non-ST elevated myocardial infarction) (H) 6/17/2014     NSTEMI (non-ST elevated myocardial infarction) (H) 06/17/2014     JULIANE (obstructive sleep apnea)     Doesn't use CPAP     JULIANE (obstructive sleep apnea)      Paracentral scotoma     LE     Paracentral scotoma     LE     Secondary renal hyperparathyroidism (H)      Secondary renal hyperparathyroidism (H)      Septic bursitis      Squamous cell carcinoma      Squamous cell carcinoma         CC Foster De Guzman MD  [address] [place] [address] on close of this encounter.

## 2022-07-19 ENCOUNTER — LAB (OUTPATIENT)
Dept: LAB | Facility: CLINIC | Age: 62
End: 2022-07-19

## 2022-07-19 ENCOUNTER — OFFICE VISIT (OUTPATIENT)
Dept: DERMATOLOGY | Facility: CLINIC | Age: 62
End: 2022-07-19
Payer: COMMERCIAL

## 2022-07-19 DIAGNOSIS — D18.01 CHERRY ANGIOMA: ICD-10-CM

## 2022-07-19 DIAGNOSIS — L91.8 ACROCHORDON: ICD-10-CM

## 2022-07-19 DIAGNOSIS — L82.1 SK (SEBORRHEIC KERATOSIS): Primary | ICD-10-CM

## 2022-07-19 DIAGNOSIS — B07.9 VERRUCA VULGARIS: ICD-10-CM

## 2022-07-19 DIAGNOSIS — B07.9 FILIFORM WART: ICD-10-CM

## 2022-07-19 DIAGNOSIS — B18.1 CHRONIC VIRAL HEPATITIS B WITHOUT DELTA AGENT AND WITHOUT COMA (H): ICD-10-CM

## 2022-07-19 DIAGNOSIS — Z85.828 HISTORY OF NONMELANOMA SKIN CANCER: ICD-10-CM

## 2022-07-19 DIAGNOSIS — Z92.25 HISTORY OF IMMUNOSUPPRESSION: ICD-10-CM

## 2022-07-19 LAB
ALBUMIN SERPL-MCNC: 3.5 G/DL (ref 3.4–5)
ALP SERPL-CCNC: 74 U/L (ref 40–150)
ALT SERPL W P-5'-P-CCNC: 22 U/L (ref 0–70)
ANION GAP SERPL CALCULATED.3IONS-SCNC: 9 MMOL/L (ref 3–14)
AST SERPL W P-5'-P-CCNC: 27 U/L (ref 0–45)
BILIRUB DIRECT SERPL-MCNC: 0.4 MG/DL (ref 0–0.2)
BILIRUB SERPL-MCNC: 1.3 MG/DL (ref 0.2–1.3)
BUN SERPL-MCNC: 15 MG/DL (ref 7–30)
CALCIUM SERPL-MCNC: 9.8 MG/DL (ref 8.5–10.1)
CHLORIDE BLD-SCNC: 105 MMOL/L (ref 94–109)
CO2 SERPL-SCNC: 27 MMOL/L (ref 20–32)
CREAT SERPL-MCNC: 0.89 MG/DL (ref 0.66–1.25)
ERYTHROCYTE [DISTWIDTH] IN BLOOD BY AUTOMATED COUNT: 15.6 % (ref 10–15)
GFR SERPL CREATININE-BSD FRML MDRD: >90 ML/MIN/1.73M2
GLUCOSE BLD-MCNC: 108 MG/DL (ref 70–99)
HCT VFR BLD AUTO: 44.8 % (ref 40–53)
HGB BLD-MCNC: 14.4 G/DL (ref 13.3–17.7)
INR PPP: 1.04 (ref 0.85–1.15)
MCH RBC QN AUTO: 28.9 PG (ref 26.5–33)
MCHC RBC AUTO-ENTMCNC: 32.1 G/DL (ref 31.5–36.5)
MCV RBC AUTO: 90 FL (ref 78–100)
PLATELET # BLD AUTO: 281 10E3/UL (ref 150–450)
POTASSIUM BLD-SCNC: 3.2 MMOL/L (ref 3.4–5.3)
PROT SERPL-MCNC: 7.4 G/DL (ref 6.8–8.8)
RBC # BLD AUTO: 4.98 10E6/UL (ref 4.4–5.9)
SODIUM SERPL-SCNC: 141 MMOL/L (ref 133–144)
WBC # BLD AUTO: 8.7 10E3/UL (ref 4–11)

## 2022-07-19 PROCEDURE — 36415 COLL VENOUS BLD VENIPUNCTURE: CPT | Performed by: PATHOLOGY

## 2022-07-19 PROCEDURE — 85610 PROTHROMBIN TIME: CPT | Performed by: PATHOLOGY

## 2022-07-19 PROCEDURE — 17110 DESTRUCTION B9 LES UP TO 14: CPT | Performed by: DERMATOLOGY

## 2022-07-19 PROCEDURE — 85027 COMPLETE CBC AUTOMATED: CPT | Performed by: PATHOLOGY

## 2022-07-19 PROCEDURE — 87517 HEPATITIS B DNA QUANT: CPT | Performed by: INTERNAL MEDICINE

## 2022-07-19 PROCEDURE — 80053 COMPREHEN METABOLIC PANEL: CPT | Performed by: PATHOLOGY

## 2022-07-19 PROCEDURE — 99213 OFFICE O/P EST LOW 20 MIN: CPT | Mod: 25 | Performed by: DERMATOLOGY

## 2022-07-19 PROCEDURE — 82248 BILIRUBIN DIRECT: CPT | Performed by: PATHOLOGY

## 2022-07-19 ASSESSMENT — PAIN SCALES - GENERAL: PAINLEVEL: NO PAIN (0)

## 2022-07-19 NOTE — NURSING NOTE
Dermatology Rooming Note    Jerad Ross's goals for this visit include:   Chief Complaint   Patient presents with     Skin Check     Jerad is here for a full body skin check.  Has no spots of concerns but as question about his fingernails.       Anny Peck, CMA

## 2022-07-19 NOTE — LETTER
7/19/2022       RE: Jerad Ross  [address] [place] [address]     Dear Colleague,    Thank you for referring your patient, Jerad Ross, to the [facility] [place] at [facility]. Please see a copy of my visit note below.    [facility] Dermatology Note  Encounter Date: Jul 19, 2022  Office Visit     Dermatology Problem List:  1.History of kidney transplant 10/6/93  - Immunosuppression: prednisone 5mg, mycophenolate 750 mg BID (previously on cyclosporine, imuran)  2. History of BCC  - Right axilla s/p MMS 4/9/12  3. History of SCC  - Central chest s/p excision 2009  - Unspecified location s/p excision 1999  3. Seborrheic and verrucous keratoses   - Monitor  4. Filiform warts   - left lateral eyelid, left alteral canthus, left temple s/p cryo 3/15/17, 7/19/22  5. Acrochordons   - L anterior neck s/p cryo 10/8/19  6. L thigh epidermal inclusion cyst   - Monitor  7.Prurigo nodularis, left forearm  - s/p cryotherapy 11/10/20    ____________________________________________    Assessment & Plan:    # History of NMSC  No evidence of recurrence on today's exams    # Filiform warts and verruca vulgaris  Scattered warts, suggested cryotherapy and patient agreed. Cryo on 4 lesions, L lateral eyelid, L zygomatic cheek, R zygomatic cheek, R elbow.  - Cryotherapy performed today (see procedure note(s) below).    # Multiple benign lesions: cherry angioma, multiple benign nevi, seborrheic keratoses, acrochordon, verrucous keratoses, filiform wart, verrucous vulgaris   Counseled on benign nature.  - ABCDEs: Counseled ABCDEs of melanoma: Asymmetry, Border (irregularity), Color (not uniform, changes in color), Diameter (greater than 6 mm which is about the size of a pencil eraser), and Evolving (any changes in preexisting moles).  - Sun protection: Counseled SPF30+ sunscreen, UPF clothing, sun avoidance, tanning bed  avoidance.      Procedures Performed:   - Cryotherapy procedure note, location(s): L lateral eye, L zygomatic cheek, R zygomatic cheek, R elbow. After verbal consent and discussion of risks and benefits including, but not limited to, dyspigmentation/scar, blister, and pain, 4 lesion(s) was(were) treated with 1-2 mm freeze border for 1-2 cycles with liquid nitrogen. Post cryotherapy instructions were provided.    Follow-up: 1 year(s) in-person, or earlier for new or changing lesions    Staff and Medical Student:     Scribe Disclosure:  I, Ivet Shelton, am serving as a scribe to prep the notes for Foster De Guzman MD .      Ryan Valdez, MS3  [employer]      Staff Physician:  I was present with the medical student who participated in the service and in the documentation of the note. I have verified the history and personally performed the physical exam and medical decision making. I agree with the assessment and plan of care as documented in the note.     Foster De Guzman MD  Staff Dermatologist and Dermatopathologist  , Department of Dermatology   ____________________________________________    CC: No chief complaint on file.    HPI:  Mr. Jerad Ross is a(n) 62 year old male who presents today as a return patient for FBSE in the context of immunosuppression (prednisone and mycophenolate) for kidney transplantation. Last seen by myself at which point 1 prurigo nodule on the L forearm was treated with cryo.     He does not report any new or changing lesions. He denies any lesions that are painful, pruritic or bleeding. His only complaint is that his fingernails are not growing which makes it challenging to play guitar. This has been a long lasting problem. He has not tried anything.    Patient is otherwise feeling well, without additional skin concerns.    Labs Reviewed:  N/A    Physical Exam:  Vitals: There were no vitals taken for this visit.  SKIN: Full skin,  which includes the head/face, both arms, chest, back, abdomen,both legs, genitalia and/or groin buttocks, digits and/or nails, was examined.  - Well demarcated rough papule with long narrow projections on L lateral eyelid  - Well demarcated rough papule with L zygomatic, R zygomatic cheek, R elbow   - There are dome shaped bright red papules on the trunk.   - Central chest and R axilla biopsy sites there is no erythema, telangectasias, nodularity, or pigmentation.  - There is(are) skin colored pedunculated papules on the neck and trunk.   - Scattered brown macules on sun exposed areas.  - There are waxy stuck on tan to brown papules on the trunk, extremities, face and scalp.  - No other lesions of concern on areas examined.     Medications:  Current Outpatient Medications   Medication     acetaminophen (TYLENOL) 325 MG tablet     albuterol (PROAIR HFA) 108 (90 Base) MCG/ACT inhaler     aspirin 81 MG tablet     budesonide (PULMICORT FLEXHALER) 90 MCG/ACT inhaler     calcium citrate-vitamin D (CITRACAL) 315-200 MG-UNIT TABS     entecavir (BARACLUDE) 0.5 MG tablet     FLUoxetine (PROZAC) 10 MG capsule     FLUoxetine (PROZAC) 40 MG capsule     fluticasone-salmeterol (ADVAIR) 250-50 MCG/DOSE inhaler     furosemide (LASIX) 20 MG tablet     isosorbide mononitrate (IMDUR) 30 MG 24 hr tablet     latanoprost (XALATAN) 0.005 % ophthalmic solution     metoprolol tartrate (LOPRESSOR) 25 MG tablet     Multiple Vitamins-Iron (ONE DAILY MULTIVITAMIN/IRON) TABS     mycophenolate (GENERIC EQUIVALENT) 250 MG capsule     nitroGLYcerin (NITROSTAT) 0.4 MG sublingual tablet     Omega-3 Fatty Acids (OMEGA 3 PO)     pantoprazole (PROTONIX) 40 MG EC tablet     polyethylene glycol (MIRALAX) 17 g packet     predniSONE (DELTASONE) 5 MG tablet     rosuvastatin (CRESTOR) 20 MG tablet     No current facility-administered medications for this visit.      Past Medical History:   Patient Active Problem List   Diagnosis     BCC (basal cell carcinoma),  trunk     JULIANE (obstructive sleep apnea)     HTN, kidney transplant related     Coronary artery disease involving native coronary artery of native heart without angina pectoris     NSTEMI (non-ST elevated myocardial infarction) (H)     Depression     Diverticulosis     Hemorrhoids     Kidney replaced by transplant     Basal cell carcinoma     Squamous cell carcinoma     Dyslipidemia     CRP elevated     Glaucoma     AION (acute ischemic optic neuropathy)     Paracentral scotoma     Hip pain     Inflamed seborrheic keratosis     Intertrigo     Chronic hepatitis B (H)     Immunosuppression (H)     Hypogonadism in male     GERD (gastroesophageal reflux disease)     Aftercare following organ transplant     Acute midline low back pain without sciatica     Septic bursitis     Impaired mobility     Infection of lumbar spine (H)     S/P CABG (coronary artery bypass graft)     Abnormal cardiovascular stress test     Past Medical History:   Diagnosis Date     Acute midline low back pain without sciatica      AION (acute ischemic optic neuropathy)      AION (acute ischemic optic neuropathy)      Anemia in chronic renal disease      Anemia in chronic renal disease      Avascular necrosis of bones of both hips (H)     s/p bilateral hip replacements     Avascular necrosis of bones of both hips (H)     s/p bilateral hip replacements     Basal cell carcinoma      Basal cell carcinoma      Chronic hepatitis B (H)      Chronic hepatitis B (H)      Clostridium difficile colitis      Clostridium difficile colitis      Coronary artery disease involving native coronary artery of native heart without angina pectoris 6/17/2014    Coronary angiogram 6/17/14: Severe distal 3-vessel disease involving small vessels, not amenable to PCI or CABG.     Coronary artery disease involving native coronary artery of native heart without angina pectoris 06/17/2014    Coronary angiogram 6/17/14: Severe distal 3-vessel disease involving small vessels, not  amenable to PCI or CABG     CRP elevated      Depression      Depression      Diverticulosis      Diverticulosis      Dyslipidemia      Dyslipidemia      Elevated C-reactive protein (CRP)      FSGS (focal segmental glomerulosclerosis)      FSGS (focal segmental glomerulosclerosis)      Gastric ulcer with hemorrhage 2/12/12     Gastric ulcer with hemorrhage 02/12/2012     GERD (gastroesophageal reflux disease)      GERD (gastroesophageal reflux disease)      Glaucoma     OHTN     Glaucoma      Hemorrhoids      Hemorrhoids      HTN (hypertension)      Hyperlipidemia      Hypertension secondary to other renal disorders      Hypogonadism in male      Hypogonadism in male      Immunosuppressed status (H)      Kidney replaced by transplant     focal glomerulosclerosis      Kidney transplanted     focal glomerulosclerosis      NSTEMI (non-ST elevated myocardial infarction) (H) 6/17/2014     NSTEMI (non-ST elevated myocardial infarction) (H) 06/17/2014     JULIANE (obstructive sleep apnea)     Doesn't use CPAP     JULIANE (obstructive sleep apnea)      Paracentral scotoma     LE     Paracentral scotoma     LE     Secondary renal hyperparathyroidism (H)      Secondary renal hyperparathyroidism (H)      Septic bursitis      Squamous cell carcinoma      Squamous cell carcinoma         CC Foster De Guzman MD  [address] [place] [address] on close of this encounter.

## 2022-07-21 NOTE — PROGRESS NOTES
Assessment per SGNA Guidelines. Non labored breathing, skin, dry, warm, and appropriate for race. Abdomen soft.      [facility] Dermatology Note    Dermatology Problem List:  1. History of kidney transplant 10/6/1993  - Immunosuppression: cyclosporine, imuran, prednisone   2. History of BCC - mid chest x 2, right axilla x 1 s/p excision   3. Inflamed seborrheic and verrucous keratoses s/p cryo  4. Intertrigo - hydrocortisone 2.5% ointment  5. Filiform warts - left lateral eyelid, left alteral canthus, left temple s/p cryo 3/15/17    CC:   Chief Complaint   Patient presents with     Skin Check     Personal hx of BCC. Jerad notes a few spots of concern today.     Date of Service: Mar 15, 2017    History of Present Illness:  Mr. Jerad Ross is a 57 year old male with a personal history of BCC presents for a skin check. Today the patient reports he has a spot of concern on the eyelid and left temple which bother him. The patient also reports an irritated area on the crease on his chest. He notes that in the summer it is worse than the winter. He reports that there are some spots on his lower neck and left jaw line which hurt when he shaves. The patient reports no other lesions of concern at this time.    Otherwise, the patient reports no painful, bleeding, nonhealing, or pruritic lesions, and denies new or changing moles.    Past Medical History:   Patient Active Problem List   Diagnosis     BCC (basal cell carcinoma), trunk     JULIANE (obstructive sleep apnea)     HTN (hypertension)     CAD (coronary artery disease)     NSTEMI (non-ST elevated myocardial infarction) (H)     Chest pain     Depression     Diverticulosis     Hemorrhoids     Status post -donor kidney transplantation     Basal cell carcinoma     Squamous cell carcinoma (H)     Hyperlipidemia     CRP elevated     Glaucoma     AION (acute ischemic optic neuropathy)     Paracentral scotoma     Hip pain     Inflamed seborrheic keratosis     Intertrigo     Viral hepatitis B chronic (H)     Past Medical History   Diagnosis Date     AION (acute  ischemic optic neuropathy)      Basal cell carcinoma      CAD (coronary artery disease) 2014     Chest pain 2014     Chronic hepatitis B (H)      CRP elevated      Depression      Diverticulosis      Gastric ulcer with hemorrhage 12     Glaucoma      OHTN     Hemorrhoids      HTN (hypertension)      Hyperlipidemia      NSTEMI (non-ST elevated myocardial infarction) (H) 2014     JULIANE (obstructive sleep apnea)      Paracentral scotoma      LE     Squamous cell carcinoma (H)      Status post -donor kidney transplantation      focal glomerulosclerosis      Past Surgical History   Procedure Laterality Date     Extracapsular cataract extration with intraocular lens implant  4-20-10, 6-1-10     Rt, Lt     Hernia repair       Lt inguinal     Hip surgery       , bilat MITZI, revised ,      Knee surgery  ,      bilat TKA     Colectomy subtotal  1983     10 cm, diverticulitis     Kidney surgery   and      transplant     Splenectomy       leukopenia, auxiliary spleen     Colonoscopy  2012     Procedure:COLONOSCOPY; Surgeon:SLOAN GALLARDO; Location: GI     Esophagoscopy, gastroscopy, duodenoscopy (egd), combined  2012     Procedure:COMBINED ESOPHAGOSCOPY, GASTROSCOPY, DUODENOSCOPY (EGD); Surgeon:SLOAN GALLARDO; Location:[facility]     Cataract iol, rt/lt  2000     RE     Cataract iol, rt/lt  2000     LE     Mohs micrographic procedure       Social History:  Not addressed at this visit    Family History:  Not addressed at this visit    Medications:  Current Outpatient Prescriptions   Medication Sig Dispense Refill     predniSONE (DELTASONE) 5 MG tablet Take 1 tablet (5 mg) by mouth daily 30 tablet 0     azaTHIOprine (IMURAN) 50 MG tablet Take 1 tablet (50 mg) by mouth daily 30 tablet 6     entecavir (BARACLUDE) 0.5 MG tablet Take 1 tablet (0.5 mg) by mouth daily 90 tablet 1     NEORAL 25 MG PO CAPSULE TAKE 2 CAPSULES BY  MOUTH TWICE DAILY 120 capsule 5     latanoprost (XALATAN) 0.005 % ophthalmic solution Place 1 drop into both eyes At Bedtime 3 Bottle 3     losartan (COZAAR) 50 MG tablet Take 1 tablet (50 mg) by mouth daily 90 tablet 3     clopidogrel (PLAVIX) 75 MG tablet Take 1 tablet (75 mg) by mouth daily 90 tablet 3     isosorbide mononitrate (IMDUR) 30 MG 24 hr tablet Take 0.5 tablets (15 mg) by mouth daily 45 tablet 3     amLODIPine (NORVASC) 5 MG tablet Take 1 tablet (5 mg) by mouth daily 90 tablet 3     ibuprofen (ADVIL,MOTRIN) 200 MG tablet Take 200 mg by mouth every 4 hours as needed for mild pain       atorvastatin (LIPITOR) 20 MG tablet Take 1 tablet (20 mg) by mouth daily You are rox to see your Cardiologist call [phone] to schedule. 90 tablet 1     FLUoxetine HCl (PROZAC PO) Take 20 mg by mouth       BETA BLOCKER NOT PRESCRIBED, INTENTIONAL, Beta Blocker not prescribed intentionally due to Bradycardia < 50 bpm without beta blocker therapy  0     aspirin 81 MG tablet Take by mouth daily       Omega-3 Fatty Acids (OMEGA 3 PO) Take 1 capsule by mouth daily       omeprazole (PRILOSEC OTC) 20 MG tablet Take  by mouth daily. 30 tablet      calcium citrate-vitamin D (CITRACAL) 315-200 MG-UNIT TABS Take 1 tablet by mouth daily.       Psyllium (METAMUCIL PO) Take 2 teaspoonful by mouth daily        Multiple Vitamins-Iron (MULTIVITAMIN/IRON PO) Take 1 tablet by mouth daily        acetaminophen (TYLENOL) 325 MG tablet Take 1-2 tablets by mouth every 6 hours as needed.       [DISCONTINUED] latanoprost (XALATAN) 0.005 % ophthalmic solution Place 1 drop Into the left eye At Bedtime RX from Dr Matthias TREVINO       Allergies:  Allergies   Allergen Reactions     Penicillins Shortness Of Breath and Hives     Contrast Dye Nausea and Vomiting     Keflex [Cephalexin Hcl] Other (See Comments)     Pt could not recall reaction     Tetracycline Other (See Comments)     Patient could not recall reaction     Sulfa Drugs Rash     Review of  Systems:  - Skin: As above in HPI. No additional skin concerns.    Physical exam:  Vitals: There were no vitals taken for this visit.  GEN: This is a well developed, well-nourished male in no acute distress, in a pleasant mood.      SKIN: Total skin excluding the undergarment areas was performed. The exam included the head/face, neck, both arms, chest, back, abdomen, both legs, digits and/or nails.   - Symmetrical about the inframammary folds, there are erythematous patches bilaterally with a slightly macerated surface and slightly red rims around the keratoses  - Left lateral eyelid and left lateral canthus: total of 4 verrucous filiform papules with two largest being central and smaller lesions on the upper eyelid and temple near hairline  - Stuck on tan to brown papules on the abdomen, trunk, arms, face  - Scattered acneiform papules on the upper chest  - No red lesions are concerning for BCC at this point  - Verrucous keratoses and stucco keratoses on the legs and feet  - No other lesions of concern on areas examined.     Impression/Plan:  1. History of renal transplant 10/6/1993.     2. History of BCC - mid chest x 2, right axilla x 1 s/p excision    - NERD, continue yearly exams    3. Intertrigo  - Start hydrocortisone 2.5% ointment - apply BID to affected areas    4. Filiform warts - left lateral eyelid, left lateral canthus, left temple. Previously biopsied, but recurred.   - Cryotherapy procedure note: After verbal consent and discussion of risks and benefits including but no limited to dyspigmentation/scar, blister, and pain, 4 was(were) treated with 1-2mm freeze border for 2 cycles with liquid nitrogen. Post cryotherapy instructions were provided.     5. Irritated seborrheic keratoses - lower anterior neck x 2, left jaw line x 1, lower central back x 1  - Cryotherapy procedure note: After verbal consent and discussion of risks and benefits including but no limited to dyspigmentation/scar, blister, and  pain, 4 was(were) treated with 1-2mm freeze border for 2 cycles with liquid nitrogen. Post cryotherapy instructions were provided.     6. Seborrheic keratoses - abdomen, trunk, arms, face  - No further intervention required. Patient to report changes.     7. Verrucous keratoses on the legs and stucco keratoses on the feet  - No further intervention required. Patient to report changes.       Follow-up in 1 year, earlier for new or changing lesions.    Staff Involved:  Staff Only    Scribe Disclosure:   I, Maryuri Fierro, am serving as a scribe to document services personally performed by Dr. Foster De Guzman, based on data collection and the provider's statements to me.     Staff attestation:  The documentation recorded by the scribe accurately reflects the services I personally performed and the decisions I personally made.    Foster De Guzman MD  Staff Dermatologist    Department of Dermatology

## 2022-07-22 ENCOUNTER — VIRTUAL VISIT (OUTPATIENT)
Dept: GASTROENTEROLOGY | Facility: CLINIC | Age: 62
End: 2022-07-22
Attending: INTERNAL MEDICINE
Payer: COMMERCIAL

## 2022-07-22 DIAGNOSIS — B18.1 CHRONIC HEPATITIS B (H): Primary | ICD-10-CM

## 2022-07-22 DIAGNOSIS — B18.1 CHRONIC VIRAL HEPATITIS B WITHOUT DELTA AGENT AND WITHOUT COMA (H): ICD-10-CM

## 2022-07-22 LAB
HBV DNA SERPL NAA+PROBE-ACNC: <20 IU/ML
HBV DNA SERPL NAA+PROBE-LOG IU: <1.3 {LOG_IU}/ML

## 2022-07-22 PROCEDURE — 99214 OFFICE O/P EST MOD 30 MIN: CPT | Mod: 95 | Performed by: INTERNAL MEDICINE

## 2022-07-22 RX ORDER — ENTECAVIR 0.5 MG/1
0.5 TABLET, FILM COATED ORAL DAILY
Qty: 90 TABLET | Refills: 3 | Status: SHIPPED | OUTPATIENT
Start: 2022-07-22 | End: 2023-06-27

## 2022-07-22 ASSESSMENT — PAIN SCALES - GENERAL: PAINLEVEL: NO PAIN (0)

## 2022-07-22 NOTE — LETTER
7/22/2022         RE: Jerad Ross  [address] [place] [address]        Dear Colleague,    Thank you for referring your patient, Jerad Ross, to the [facility] HEPATOLOGY [facility] [place]. Please see a copy of my visit note below.    [facility]  LIVER CLINIC FOLLOW UP  Video Visit  A/P  Mr. Ross is a 62 Y M with HBV.     HBV He is on entecavir with normal liver tests. F1-F2 by fibrosis scan in 2017. Will repeat fibrosis scan. Ordered. Renewed entecavir.  HBV DNA below limits of detection in past. Recent result pending.   Will continue with every 6 month labs and US. Ordered.  HCC screening US ordered for the last 2 years and he did not schedule. Discussed this with him. He will schedule.  Fatigue Asked him to schedule appt with PCP for a physical.  CRC screening due 2025    RTC 1 y.  Lina Claros MD    Hepatology/Liver Transplant  Medical Director, Liver Transplantation  [facility]    Appointments [phone]  Clinic Fax [phone]  Transplant Care [phone] Option 4  Transplant Fax [phone]  Administrative Office [phone]  Administrative Fax [phone]  ===================================================================  Subjective  Jerad Ross is a 62 Y M with hepatitis B. 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 On entecavir since 2016. He is having no problems getting it through a marjan and takes it daily.    He states he has been well in general but feels more tired than usual. He has not seen his PCP in quite a while.    HBV DNA pending.  eAby positive  eAg negative  Assessment of fibrosis: fibrosis scan 9/16/17 F1-F2  Histrory of treatment: entecavir only. Was not able to get it reliably in past. Now he can and has been taking it consistently.      HCV negative in 2008  HIV not documented  ETOH use: none    Lab Test 07/19/22  1036   PROTTOTAL 7.4   ALBUMIN 3.5   BILITOTAL 1.3   ALKPHOS 74   AST 27    ALT 22     CBC RESULTS: Recent Labs   Lab Test 07/19/22  1036   WBC 8.7   RBC 4.98   HGB 14.4   HCT 44.8   MCV 90   MCH 28.9   MCHC 32.1   RDW 15.6*             Past Medical History        Past Medical History:   Diagnosis Date     AION (acute ischemic optic neuropathy)       Anemia in chronic renal disease       Avascular necrosis of bones of both hips (H)       s/p bilateral hip replacements     Basal cell carcinoma       CAD (coronary artery disease) 6/17/2014     Chronic hepatitis B (H)       Clostridium difficile colitis       CRP elevated       Depression       Diverticulosis       Dyslipidemia       FSGS (focal segmental glomerulosclerosis)       Gastric ulcer with hemorrhage 2/12/12     GERD (gastroesophageal reflux disease)       Glaucoma       OHTN     Hemorrhoids       Hypertension secondary to other renal disorders       Hypogonadism in male       Immunosuppressed status (H)       Kidney replaced by transplant       focal glomerulosclerosis      NSTEMI (non-ST elevated myocardial infarction) (H) 6/17/2014     JULIANE (obstructive sleep apnea)       Doesn't use CPAP     Paracentral scotoma       LE     Secondary renal hyperparathyroidism (H)       Squamous cell carcinoma             Social History   Social History            Social History     Marital status:        Spouse name: N/A     Number of children: 0     Years of education: N/A           Occupational History       Disabled      [employer]              Social History Main Topics     Smoking status: Former Smoker       Packs/day: 1.00       Years: 9.00       Quit date: 1/1/1988     Smokeless tobacco: Former User     Alcohol use 0.0 oz/week        0 Standard drinks or equivalent per week          Comment: rarely 1 drink per month     Drug use: No     Sexual activity: Not on file            Other Topics Concern     Special Diet No     Exercise Yes       walks      Social History Narrative           Family History         "  Family History   Problem Relation Age of Onset     Cardiovascular Father         AI with valve repair     Hypertension Father       KIDNEY DISEASE Father       CANCER Paternal Grandmother         ovarian ca     CEREBROVASCULAR DISEASE Paternal Grandfather       CEREBROVASCULAR DISEASE Maternal Grandfather       KIDNEY DISEASE Paternal Aunt       Skin Cancer No family hx of       Glaucoma No family hx of       Macular Degeneration No family hx of       Amblyopia No family hx of             ROS: 10 point ROS neg other than the symptoms noted above in the HPI.  Exam  Gen Alert pleasant NAD  Resp No difficulty breathing. No cough  Skin No Jaundice  Eyes No icterus  Neuro EDMONDSON  MSK no muscle wasting  Psyche Pleasant, appropriate. Well groomed.  Jerad Ross is a 61 year old male who is being evaluated via a billable video visit.      The patient has been notified of following:   \"This video visit will be conducted via a call between you and your physician/provider. We have found that certain health care needs can be provided without the need for an in-person physical exam.  This service lets us provide the care you need with a video conversation.  If a prescription is necessary we can send it directly to your pharmacy.  If lab work is needed we can place an order for that and you can then stop by our lab to have the test done at a later time. Video visits are billed at different rates depending on your insurance coverage.  Please reach out to your insurance provider with any questions.  If during the course of the call the physician/provider feels a video visit is not appropriate, you will not be charged for this service.\"   Patient has given verbal consent for Video visit? Yes    Type of service:  Video Visit  Video Start Time 1130  Video End Time   Patient location: home  Will anyone else be joining your video visit? no  {If patient encounters technical issues they should call [phone] :1  Distant Location " (provider location):  [facility] HEPATOLOGY CLINIC [place]   Mode of Communication:  Video Conference via [employer]  I have reviewed and updated the patient's Past Medical History, Social History, Family History and Medication List.      Again, thank you for allowing me to participate in the care of your patient.        Sincerely,        Lina Claros MD

## 2022-07-22 NOTE — PROGRESS NOTES
[facility]  LIVER CLINIC FOLLOW UP  Video Visit  A/P  Mr. Ross is a 62 Y M with HBV.     HBV He is on entecavir with normal liver tests. F1-F2 by fibrosis scan in 2017. Will repeat fibrosis scan. Ordered. Renewed entecavir.  HBV DNA below limits of detection in past. Recent result pending.   Will continue with every 6 month labs and US. Ordered.  HCC screening US ordered for the last 2 years and he did not schedule. Discussed this with him. He will schedule.  Fatigue Asked him to schedule appt with PCP for a physical.  CRC screening due 2025    RTC 1 y.  Lina Claros MD    Hepatology/Liver Transplant  Medical Director, Liver Transplantation  [facility]    Appointments [phone]  Clinic Fax [phone]  Transplant Care [phone] Option 4  Transplant Fax [phone]  Administrative Office [phone]  Administrative Fax [phone]  ===================================================================  Subjective  Jerad Ross is a 62 Y M with hepatitis B. 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 On entecavir since 2016. He is having no problems getting it through a marjan and takes it daily.    He states he has been well in general but feels more tired than usual. He has not seen his PCP in quite a while.    HBV DNA pending.  eAby positive  eAg negative  Assessment of fibrosis: fibrosis scan 9/16/17 F1-F2  Histrory of treatment: entecavir only. Was not able to get it reliably in past. Now he can and has been taking it consistently.      HCV negative in 2008  HIV not documented  ETOH use: none    Lab Test 07/19/22  1036   PROTTOTAL 7.4   ALBUMIN 3.5   BILITOTAL 1.3   ALKPHOS 74   AST 27   ALT 22     CBC RESULTS: Recent Labs   Lab Test 07/19/22  1036   WBC 8.7   RBC 4.98   HGB 14.4   HCT 44.8   MCV 90   MCH 28.9   MCHC 32.1   RDW 15.6*             Past Medical History        Past Medical History:   Diagnosis Date     AION (acute ischemic  optic neuropathy)       Anemia in chronic renal disease       Avascular necrosis of bones of both hips (H)       s/p bilateral hip replacements     Basal cell carcinoma       CAD (coronary artery disease) 6/17/2014     Chronic hepatitis B (H)       Clostridium difficile colitis       CRP elevated       Depression       Diverticulosis       Dyslipidemia       FSGS (focal segmental glomerulosclerosis)       Gastric ulcer with hemorrhage 2/12/12     GERD (gastroesophageal reflux disease)       Glaucoma       OHTN     Hemorrhoids       Hypertension secondary to other renal disorders       Hypogonadism in male       Immunosuppressed status (H)       Kidney replaced by transplant       focal glomerulosclerosis      NSTEMI (non-ST elevated myocardial infarction) (H) 6/17/2014     JULIANE (obstructive sleep apnea)       Doesn't use CPAP     Paracentral scotoma       LE     Secondary renal hyperparathyroidism (H)       Squamous cell carcinoma             Social History   Social History            Social History     Marital status:        Spouse name: N/A     Number of children: 0     Years of education: N/A           Occupational History       Disabled      [employer]              Social History Main Topics     Smoking status: Former Smoker       Packs/day: 1.00       Years: 9.00       Quit date: 1/1/1988     Smokeless tobacco: Former User     Alcohol use 0.0 oz/week        0 Standard drinks or equivalent per week          Comment: rarely 1 drink per month     Drug use: No     Sexual activity: Not on file            Other Topics Concern     Special Diet No     Exercise Yes       walks      Social History Narrative           Family History          Family History   Problem Relation Age of Onset     Cardiovascular Father         AI with valve repair     Hypertension Father       KIDNEY DISEASE Father       CANCER Paternal Grandmother         ovarian ca     CEREBROVASCULAR DISEASE Paternal Grandfather    "    CEREBROVASCULAR DISEASE Maternal Grandfather       KIDNEY DISEASE Paternal Aunt       Skin Cancer No family hx of       Glaucoma No family hx of       Macular Degeneration No family hx of       Amblyopia No family hx of             ROS: 10 point ROS neg other than the symptoms noted above in the HPI.  Exam  Gen Alert pleasant NAD  Resp No difficulty breathing. No cough  Skin No Jaundice  Eyes No icterus  Neuro EDMONDSON  MSK no muscle wasting  Psyche Pleasant, appropriate. Well groomed.  Jerad Ross is a 61 year old male who is being evaluated via a billable video visit.      The patient has been notified of following:   \"This video visit will be conducted via a call between you and your physician/provider. We have found that certain health care needs can be provided without the need for an in-person physical exam.  This service lets us provide the care you need with a video conversation.  If a prescription is necessary we can send it directly to your pharmacy.  If lab work is needed we can place an order for that and you can then stop by our lab to have the test done at a later time. Video visits are billed at different rates depending on your insurance coverage.  Please reach out to your insurance provider with any questions.  If during the course of the call the physician/provider feels a video visit is not appropriate, you will not be charged for this service.\"   Patient has given verbal consent for Video visit? Yes    Type of service:  Video Visit  Video Start Time 1130  Video End Time 1157  Patient location: home  Will anyone else be joining your video visit? no  {If patient encounters technical issues they should call [phone] :1  Distant Location (provider location):  [facility] HEPATOLOGY CLINIC [place]   Mode of Communication:  Video Conference via [employer]  I have reviewed and updated the patient's Past Medical History, Social History, Family History and Medication List.          "

## 2022-07-22 NOTE — PROGRESS NOTES
"Jerad is a 62 year old who is being evaluated via a billable video visit.      How would you like to obtain your AVS? MyChart  If the video visit is dropped, the invitation should be resent by: Text to cell phone: [phone]  Will anyone else be joining your video visit? No  {If patient encounters technical issues they should call [phone] :[id number]}      Video-Visit Details    Video Start Time: {video visit start/end time for provider to select:345578}    Type of service:  Video Visit    Video End Time:{video visit start/end time for provider to select:743876}    Originating Location (pt. Location): {video visit patient location:067192::\"Home\"}    Distant Location (provider location):  [facility] HEPATOLOGY CLINIC [place]     Platform used for Video Visit: [employer]    "

## 2022-07-26 ENCOUNTER — MYC MEDICAL ADVICE (OUTPATIENT)
Dept: CARDIOLOGY | Facility: CLINIC | Age: 62
End: 2022-07-26

## 2022-07-26 ENCOUNTER — MYC MEDICAL ADVICE (OUTPATIENT)
Dept: INTERNAL MEDICINE | Facility: CLINIC | Age: 62
End: 2022-07-26

## 2022-07-26 DIAGNOSIS — Z95.1 S/P CABG (CORONARY ARTERY BYPASS GRAFT): Primary | ICD-10-CM

## 2022-07-26 DIAGNOSIS — R07.89 CHEST TIGHTNESS: ICD-10-CM

## 2022-07-26 DIAGNOSIS — R06.09 DYSPNEA ON EXERTION: ICD-10-CM

## 2022-07-26 DIAGNOSIS — E87.6 HYPOKALEMIA: Primary | ICD-10-CM

## 2022-07-27 RX ORDER — POTASSIUM CHLORIDE 1500 MG/1
20 TABLET, EXTENDED RELEASE ORAL DAILY
Qty: 100 TABLET | Refills: 1 | Status: SHIPPED | OUTPATIENT
Start: 2022-07-27 | End: 2023-06-12

## 2022-07-27 NOTE — TELEPHONE ENCOUNTER
Sascha Lundberg MD Caswell, Mallory J, RN  Yes, please hold beta-blocker morning of an entire day before stress test.   LF                 msg to patient. -[facility]

## 2022-07-27 NOTE — TELEPHONE ENCOUNTER
Sascha Lundberg MD  You Yesterday (11:44 AM)     LF    I responded to him, please arrange for an exercise stress nuclear.   LF            NM treadmill ordered. msg to schedulers. -[facility]

## 2022-07-28 ENCOUNTER — MYC MEDICAL ADVICE (OUTPATIENT)
Dept: INTERNAL MEDICINE | Facility: CLINIC | Age: 62
End: 2022-07-28

## 2022-07-28 DIAGNOSIS — Z23 ENCOUNTER FOR IMMUNIZATION: ICD-10-CM

## 2022-07-28 DIAGNOSIS — Q89.01 ASPLENIA: Primary | ICD-10-CM

## 2022-07-28 NOTE — TELEPHONE ENCOUNTER
Dr Perry pt   Asplenia   Needs:   HIB   Menactra now and in two mo   Bexsero now and in one mo (can do at time of second menactra is ok)   Prevnar 20     So could do HIB, menactra, bexsero now     One mo later do Prev20     One mo later do menactra/bexsero

## 2022-07-29 NOTE — TELEPHONE ENCOUNTER
Sascha Lundberg MD Caswell, Mallory J, RN  Yes, pharmacological stress nuclear with no beta-blocker hold is appropriate.   LF         Order placed for lexiscan- msg to schedulers -[facility] [place] [facility]

## 2022-08-02 ENCOUNTER — HOSPITAL ENCOUNTER (OUTPATIENT)
Dept: NUCLEAR MEDICINE | Facility: HOSPITAL | Age: 62
Discharge: HOME | End: 2022-08-02
Attending: INTERNAL MEDICINE
Payer: COMMERCIAL

## 2022-08-02 ENCOUNTER — HOSPITAL ENCOUNTER (OUTPATIENT)
Dept: CARDIOLOGY | Facility: HOSPITAL | Age: 62
Discharge: HOME | End: 2022-08-02
Attending: INTERNAL MEDICINE
Payer: COMMERCIAL

## 2022-08-02 DIAGNOSIS — R07.89 CHEST TIGHTNESS: ICD-10-CM

## 2022-08-02 DIAGNOSIS — Z95.1 S/P CABG (CORONARY ARTERY BYPASS GRAFT): ICD-10-CM

## 2022-08-02 DIAGNOSIS — R06.09 DYSPNEA ON EXERTION: ICD-10-CM

## 2022-08-02 LAB
CV STRESS CURRENT BP HE: NORMAL
CV STRESS CURRENT HR HE: 54
CV STRESS CURRENT HR HE: 55
CV STRESS CURRENT HR HE: 56
CV STRESS CURRENT HR HE: 63
CV STRESS CURRENT HR HE: 66
CV STRESS CURRENT HR HE: 67
CV STRESS CURRENT HR HE: 67
CV STRESS CURRENT HR HE: 70
CV STRESS CURRENT HR HE: 70
CV STRESS CURRENT HR HE: 75
CV STRESS CURRENT HR HE: 76
CV STRESS DEVIATION TIME HE: NORMAL
CV STRESS ECHO PERCENT HR HE: NORMAL
CV STRESS EXERCISE STAGE HE: NORMAL
CV STRESS FINAL RESTING BP HE: NORMAL
CV STRESS FINAL RESTING HR HE: 63
CV STRESS MAX HR HE: 77
CV STRESS MAX TREADMILL GRADE HE: 0
CV STRESS MAX TREADMILL SPEED HE: 0
CV STRESS PEAK DIA BP HE: NORMAL
CV STRESS PEAK SYS BP HE: NORMAL
CV STRESS PHASE HE: NORMAL
CV STRESS PROTOCOL HE: NORMAL
CV STRESS RESTING PT POSITION HE: NORMAL
CV STRESS ST DEVIATION AMOUNT HE: NORMAL
CV STRESS ST DEVIATION ELEVATION HE: NORMAL
CV STRESS ST EVELATION AMOUNT HE: NORMAL
CV STRESS TEST TYPE HE: NORMAL
CV STRESS TOTAL STAGE TIME MIN 1 HE: NORMAL
RATE PRESSURE PRODUCT: 9086
STRESS ECHO BASELINE DIASTOLIC HE: 76
STRESS ECHO BASELINE HR: 52
STRESS ECHO BASELINE SYSTOLIC BP: 118
STRESS ECHO CALCULATED PERCENT HR: 48 %
STRESS ECHO LAST STRESS DIASTOLIC BP: 76
STRESS ECHO LAST STRESS HR: 70
STRESS ECHO LAST STRESS SYSTOLIC BP: 118
STRESS ECHO TARGET HR: 160

## 2022-08-02 PROCEDURE — A9500 TC99M SESTAMIBI: HCPCS | Performed by: INTERNAL MEDICINE

## 2022-08-02 PROCEDURE — 93017 CV STRESS TEST TRACING ONLY: CPT

## 2022-08-02 PROCEDURE — 78452 HT MUSCLE IMAGE SPECT MULT: CPT

## 2022-08-02 PROCEDURE — 343N000001 HC RX 343: Performed by: INTERNAL MEDICINE

## 2022-08-02 PROCEDURE — 78452 HT MUSCLE IMAGE SPECT MULT: CPT | Mod: 26 | Performed by: INTERNAL MEDICINE

## 2022-08-02 PROCEDURE — 93018 CV STRESS TEST I&R ONLY: CPT | Performed by: INTERNAL MEDICINE

## 2022-08-02 PROCEDURE — 93016 CV STRESS TEST SUPVJ ONLY: CPT | Performed by: INTERNAL MEDICINE

## 2022-08-02 PROCEDURE — 250N000011 HC RX IP 250 OP 636: Performed by: INTERNAL MEDICINE

## 2022-08-02 RX ORDER — REGADENOSON 0.08 MG/ML
0.4 INJECTION, SOLUTION INTRAVENOUS ONCE
Status: COMPLETED | OUTPATIENT
Start: 2022-08-02 | End: 2022-08-02

## 2022-08-02 RX ORDER — CAFFEINE 200 MG
200 TABLET ORAL
Status: DISCONTINUED | OUTPATIENT
Start: 2022-08-02 | End: 2022-08-02 | Stop reason: HOSPADM

## 2022-08-02 RX ORDER — CAFFEINE CITRATE 20 MG/ML
60 SOLUTION INTRAVENOUS
Status: DISCONTINUED | OUTPATIENT
Start: 2022-08-02 | End: 2022-08-02 | Stop reason: HOSPADM

## 2022-08-02 RX ORDER — AMINOPHYLLINE 25 MG/ML
50 INJECTION, SOLUTION INTRAVENOUS
Status: DISCONTINUED | OUTPATIENT
Start: 2022-08-02 | End: 2022-08-02 | Stop reason: HOSPADM

## 2022-08-02 RX ORDER — ALBUTEROL SULFATE 0.83 MG/ML
2.5 SOLUTION RESPIRATORY (INHALATION)
Status: DISCONTINUED | OUTPATIENT
Start: 2022-08-02 | End: 2022-08-02 | Stop reason: HOSPADM

## 2022-08-02 RX ADMIN — Medication 32.1 MILLICURIE: at 13:41

## 2022-08-02 RX ADMIN — Medication 8.8 MCI.: at 11:35

## 2022-08-02 RX ADMIN — REGADENOSON 0.4 MG: 0.08 INJECTION, SOLUTION INTRAVENOUS at 12:46

## 2022-08-09 ENCOUNTER — ANCILLARY PROCEDURE (OUTPATIENT)
Dept: ULTRASOUND IMAGING | Facility: CLINIC | Age: 62
End: 2022-08-09
Attending: INTERNAL MEDICINE
Payer: COMMERCIAL

## 2022-08-09 DIAGNOSIS — B18.1 CHRONIC HEPATITIS B (H): ICD-10-CM

## 2022-08-09 PROCEDURE — 76705 ECHO EXAM OF ABDOMEN: CPT | Mod: GC | Performed by: RADIOLOGY

## 2022-08-12 ENCOUNTER — VIRTUAL VISIT (OUTPATIENT)
Dept: PSYCHIATRY | Facility: CLINIC | Age: 62
End: 2022-08-12
Attending: NURSE PRACTITIONER
Payer: COMMERCIAL

## 2022-08-12 DIAGNOSIS — F33.41 RECURRENT MAJOR DEPRESSIVE DISORDER, IN PARTIAL REMISSION (H): ICD-10-CM

## 2022-08-12 PROCEDURE — 99213 OFFICE O/P EST LOW 20 MIN: CPT | Mod: 95 | Performed by: NURSE PRACTITIONER

## 2022-08-12 ASSESSMENT — PATIENT HEALTH QUESTIONNAIRE - PHQ9
10. IF YOU CHECKED OFF ANY PROBLEMS, HOW DIFFICULT HAVE THESE PROBLEMS MADE IT FOR YOU TO DO YOUR WORK, TAKE CARE OF THINGS AT HOME, OR GET ALONG WITH OTHER PEOPLE: NOT DIFFICULT AT ALL
SUM OF ALL RESPONSES TO PHQ QUESTIONS 1-9: 4
SUM OF ALL RESPONSES TO PHQ QUESTIONS 1-9: 4
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
## 2022-08-19 ENCOUNTER — ALLIED HEALTH/NURSE VISIT (OUTPATIENT)
Dept: INTERNAL MEDICINE | Facility: CLINIC | Age: 62
End: 2022-08-19
Payer: COMMERCIAL

## 2022-08-19 DIAGNOSIS — Q89.01 ASPLENIA: ICD-10-CM

## 2022-08-19 PROCEDURE — 90620 MENB-4C VACCINE IM: CPT

## 2022-08-19 PROCEDURE — 90648 HIB PRP-T VACCINE 4 DOSE IM: CPT

## 2022-08-19 PROCEDURE — 90471 IMMUNIZATION ADMIN: CPT

## 2022-08-19 PROCEDURE — 90734 MENACWYD/MENACWYCRM VACC IM: CPT

## 2022-08-19 PROCEDURE — 90472 IMMUNIZATION ADMIN EACH ADD: CPT

## 2022-08-19 NOTE — PROGRESS NOTES
Jerad Ross received the following vaccines today in clinic at the request of Dr. Aston Perry:    1. HIB Vaccine (ACTHIB), CONJUGATE  2. MENINGOCOCCAL (BEXSERO)  3. MENINGOCOCCAL VACCINE< IM (MENACTRA)    The immunizations were given under the supervision of Dr. Sabino Rico if assistance was needed. The patient does not report a history of adverse reactions associated with vaccine administration. Before administering today's immunizations, the CAM pharmacist was consulted to ensure that there were no medical contraindications for the vaccines; the CAM pharmacist ensured no medical contraindications for today's immunizations. The immunizations sitse were cleaned with an alcohol prep wipe. The immunizations were given without incident--see immunization list for administration details. No swelling or redness was observed at the sites of injection after the immunizations were given. The patient was advised to remain in fourth [facility] for fifteen minutes after the injection in case of an averse reaction.     Miguel Zabala, EMT at 2:03 PM on 8/19/2022

## 2022-09-16 DIAGNOSIS — B18.1 CHRONIC HEPATITIS B (H): ICD-10-CM

## 2022-09-16 DIAGNOSIS — B18.1 CHRONIC VIRAL HEPATITIS B WITHOUT DELTA AGENT AND WITHOUT COMA (H): ICD-10-CM

## 2022-09-16 PROCEDURE — 91200 LIVER ELASTOGRAPHY: CPT | Mod: 26 | Performed by: PHYSICIAN ASSISTANT

## 2022-09-29 DIAGNOSIS — R06.09 DYSPNEA ON EXERTION: ICD-10-CM

## 2022-09-29 DIAGNOSIS — I25.10 CORONARY ARTERY DISEASE INVOLVING NATIVE CORONARY ARTERY OF NATIVE HEART WITHOUT ANGINA PECTORIS: ICD-10-CM

## 2022-09-29 DIAGNOSIS — K21.9 GASTROESOPHAGEAL REFLUX DISEASE WITHOUT ESOPHAGITIS: ICD-10-CM

## 2022-09-29 RX ORDER — FUROSEMIDE 20 MG
TABLET ORAL
Qty: 90 TABLET | Refills: 1 | Status: SHIPPED | OUTPATIENT
Start: 2022-09-29 | End: 2022-11-01

## 2022-09-29 RX ORDER — METOPROLOL TARTRATE 25 MG/1
TABLET, FILM COATED ORAL
Qty: 90 TABLET | Refills: 1 | Status: ON HOLD | OUTPATIENT
Start: 2022-09-29 | End: 2023-06-13

## 2022-10-03 DIAGNOSIS — H40.053 BORDERLINE GLAUCOMA WITH OCULAR HYPERTENSION, BILATERAL: ICD-10-CM

## 2022-10-03 RX ORDER — LATANOPROST 50 UG/ML
1 SOLUTION/ DROPS OPHTHALMIC AT BEDTIME
Qty: 2.5 ML | Refills: 11 | Status: SHIPPED | OUTPATIENT
Start: 2022-10-03

## 2022-10-03 RX ORDER — PANTOPRAZOLE SODIUM 40 MG/1
40 TABLET, DELAYED RELEASE ORAL DAILY
Qty: 90 TABLET | Refills: 0 | Status: SHIPPED | OUTPATIENT
Start: 2022-10-03 | End: 2022-11-01

## 2022-10-03 NOTE — TELEPHONE ENCOUNTER
latanoprost (XALATAN) 0.005 % ophthalmic solution  Last Written Prescription Date:   2/2/2022  Last Fill Quantity: 2.5,   # refills: 6  Last Office Visit :  6/8/2022  Future Office visit:  None     Kenneth Bella, OD  Optometry      Pt states vision is stable since last visit.   Hard to adjust from light to dark.     Latanoprost at night, did not take last night.       2.5 mL, 11 Refills sent to pharm 10/3/2022      Mireille Pacheco RN  Central Triage Red Flags/Med Refills

## 2022-10-03 NOTE — TELEPHONE ENCOUNTER
Last Clinic Visit: 9/2/2021 [facility] [place]    Appointment due: Elizabeth refill of Pantoprozole was sent for 90 day supply and staff message sent to [facility] clinic scheduling.

## 2022-10-04 NOTE — TELEPHONE ENCOUNTER
Patient schedule annual physical 11/01/22 with PCP. Patient confirmed appointment.     Belen Farah, Clinic , 10/04/22 at 8:15 AM

## 2022-10-07 DIAGNOSIS — Z94.0 KIDNEY TRANSPLANTED: ICD-10-CM

## 2022-10-07 RX ORDER — PREDNISONE 5 MG/1
TABLET ORAL
Qty: 90 TABLET | Refills: 0 | Status: SHIPPED | OUTPATIENT
Start: 2022-10-07 | End: 2022-10-31

## 2022-10-10 DIAGNOSIS — R05.9 COUGH: ICD-10-CM

## 2022-10-10 DIAGNOSIS — R06.2 WHEEZING: ICD-10-CM

## 2022-10-13 DIAGNOSIS — R06.2 WHEEZING: ICD-10-CM

## 2022-10-13 DIAGNOSIS — R05.9 COUGH: ICD-10-CM

## 2022-10-13 RX ORDER — ALBUTEROL SULFATE 90 UG/1
2 AEROSOL, METERED RESPIRATORY (INHALATION) EVERY 6 HOURS PRN
Qty: 1 G | Refills: 0 | Status: SHIPPED | OUTPATIENT
Start: 2022-10-13 | End: 2022-11-01

## 2022-10-13 NOTE — TELEPHONE ENCOUNTER
albuterol (PROAIR HFA) 108 (90 Base) MCG/ACT inhaler  Last Written Prescription Date:   10/28/2020  Last Fill Quantity: 1,   # refills: 2  Last Office Visit :  9/2/2021  Future Office visit:   11/1/2022    Routing refill request to provider for review/approval because:  Gaps in refills.   Last filled October 2020  Refer to clinic for review      Mireille Pacheco RN  Central Triage Red Flags/Med Refills

## 2022-10-14 ENCOUNTER — ALLIED HEALTH/NURSE VISIT (OUTPATIENT)
Dept: INTERNAL MEDICINE | Facility: CLINIC | Age: 62
End: 2022-10-14
Payer: COMMERCIAL

## 2022-10-14 DIAGNOSIS — Z23 ENCOUNTER FOR IMMUNIZATION: Primary | ICD-10-CM

## 2022-10-14 PROCEDURE — 90686 IIV4 VACC NO PRSV 0.5 ML IM: CPT

## 2022-10-14 PROCEDURE — G0008 ADMIN INFLUENZA VIRUS VAC: HCPCS

## 2022-10-14 PROCEDURE — 0124A COVID-19,PF,PFIZER BOOSTER BIVALENT: CPT

## 2022-10-14 PROCEDURE — 91312 COVID-19,PF,PFIZER BOOSTER BIVALENT: CPT

## 2022-10-14 NOTE — PROGRESS NOTES
At the request of Dr. Aston Perry, Jerad Ross received the Influenza Vaccine Fluzone Quadrivalent [id number] Formula No Preservative vaccine today in clinic. The flu shot was given under the supervision of Dr. Sabino Rico if assistance was needed. The immunization site was cleaned with an alcohol prep wipe. The flu shot was given without incident--see immunization list for administration details. No swelling or redness was observed at the site of injection after the immunization was given. The patient was advised to remain in fourth floor lobby of the [facility] for fifteen minutes after the injection in case of an adverse reaction.     Additionally, Jerad Ross received the COVID-19 Pfizer Bivalent immunization today in clinic at the request of Dr. Aston Perry. The immunization was given under the supervision of Dr. Sabino Rico if assistance was needed. The patient does not report a history of adverse reactions associated with vaccine administration. The immunization site was cleaned with an alcohol prep wipe. The immunization was given without incident--see immunization list for administration details. No swelling or redness was observed at the site of injection after the immunization was given. The patient was advised to remain in fourth floor lobby of the [facility] for fifteen minutes after the injection in case of an adverse reaction.     ESTUARDO Gomez at 1:11 PM on 10/14/2022

## 2022-10-17 RX ORDER — ALBUTEROL SULFATE 90 UG/1
AEROSOL, METERED RESPIRATORY (INHALATION)
Qty: 20.1 G | OUTPATIENT
Start: 2022-10-17

## 2022-10-21 ENCOUNTER — LAB (OUTPATIENT)
Dept: LAB | Facility: CLINIC | Age: 62
End: 2022-10-21
Payer: COMMERCIAL

## 2022-10-21 DIAGNOSIS — E78.5 DYSLIPIDEMIA: ICD-10-CM

## 2022-10-21 DIAGNOSIS — Z94.0 KIDNEY REPLACED BY TRANSPLANT: ICD-10-CM

## 2022-10-21 DIAGNOSIS — E29.1 HYPOGONADISM IN MALE: ICD-10-CM

## 2022-10-21 DIAGNOSIS — Z12.5 ENCOUNTER FOR SCREENING FOR MALIGNANT NEOPLASM OF PROSTATE: ICD-10-CM

## 2022-10-21 DIAGNOSIS — R73.02 IGT (IMPAIRED GLUCOSE TOLERANCE): ICD-10-CM

## 2022-10-21 LAB
ANION GAP SERPL CALCULATED.3IONS-SCNC: 10 MMOL/L (ref 7–15)
BASOPHILS # BLD AUTO: 0.1 10E3/UL (ref 0–0.2)
BASOPHILS NFR BLD AUTO: 1 %
BUN SERPL-MCNC: 14.1 MG/DL (ref 8–23)
CALCIUM SERPL-MCNC: 10.1 MG/DL (ref 8.8–10.2)
CHLORIDE SERPL-SCNC: 102 MMOL/L (ref 98–107)
CHOLEST SERPL-MCNC: 151 MG/DL
CREAT SERPL-MCNC: 0.83 MG/DL (ref 0.67–1.17)
DEPRECATED HCO3 PLAS-SCNC: 29 MMOL/L (ref 22–29)
EOSINOPHIL # BLD AUTO: 0.3 10E3/UL (ref 0–0.7)
EOSINOPHIL NFR BLD AUTO: 3 %
ERYTHROCYTE [DISTWIDTH] IN BLOOD BY AUTOMATED COUNT: 15 % (ref 10–15)
GFR SERPL CREATININE-BSD FRML MDRD: >90 ML/MIN/1.73M2
GLUCOSE SERPL-MCNC: 87 MG/DL (ref 70–99)
HBA1C MFR BLD: 6 %
HCT VFR BLD AUTO: 45.4 % (ref 40–53)
HDLC SERPL-MCNC: 65 MG/DL
HGB BLD-MCNC: 14.3 G/DL (ref 13.3–17.7)
IMM GRANULOCYTES # BLD: 0 10E3/UL
IMM GRANULOCYTES NFR BLD: 0 %
LDLC SERPL CALC-MCNC: 67 MG/DL
LYMPHOCYTES # BLD AUTO: 2.7 10E3/UL (ref 0.8–5.3)
LYMPHOCYTES NFR BLD AUTO: 37 %
MCH RBC QN AUTO: 28.6 PG (ref 26.5–33)
MCHC RBC AUTO-ENTMCNC: 31.5 G/DL (ref 31.5–36.5)
MCV RBC AUTO: 91 FL (ref 78–100)
MONOCYTES # BLD AUTO: 0.7 10E3/UL (ref 0–1.3)
MONOCYTES NFR BLD AUTO: 9 %
NEUTROPHILS # BLD AUTO: 3.6 10E3/UL (ref 1.6–8.3)
NEUTROPHILS NFR BLD AUTO: 50 %
NONHDLC SERPL-MCNC: 86 MG/DL
NRBC # BLD AUTO: 0 10E3/UL
NRBC BLD AUTO-RTO: 0 /100
PLATELET # BLD AUTO: 315 10E3/UL (ref 150–450)
POTASSIUM SERPL-SCNC: 4 MMOL/L (ref 3.4–5.3)
PSA SERPL-MCNC: 0.75 NG/ML (ref 0–4.5)
RBC # BLD AUTO: 5 10E6/UL (ref 4.4–5.9)
SHBG SERPL-SCNC: 54 NMOL/L (ref 11–80)
SODIUM SERPL-SCNC: 141 MMOL/L (ref 136–145)
TRIGL SERPL-MCNC: 94 MG/DL
WBC # BLD AUTO: 7.3 10E3/UL (ref 4–11)

## 2022-10-21 PROCEDURE — 80061 LIPID PANEL: CPT | Mod: 90 | Performed by: PATHOLOGY

## 2022-10-21 PROCEDURE — 83036 HEMOGLOBIN GLYCOSYLATED A1C: CPT | Mod: 90 | Performed by: PATHOLOGY

## 2022-10-21 PROCEDURE — 84403 ASSAY OF TOTAL TESTOSTERONE: CPT | Mod: 90 | Performed by: PATHOLOGY

## 2022-10-21 PROCEDURE — 99000 SPECIMEN HANDLING OFFICE-LAB: CPT | Performed by: PATHOLOGY

## 2022-10-21 PROCEDURE — G0103 PSA SCREENING: HCPCS | Mod: 90 | Performed by: PATHOLOGY

## 2022-10-21 PROCEDURE — 36415 COLL VENOUS BLD VENIPUNCTURE: CPT | Performed by: PATHOLOGY

## 2022-10-21 PROCEDURE — 85025 COMPLETE CBC W/AUTO DIFF WBC: CPT | Performed by: PATHOLOGY

## 2022-10-21 PROCEDURE — 80048 BASIC METABOLIC PNL TOTAL CA: CPT | Performed by: PATHOLOGY

## 2022-10-21 PROCEDURE — 84270 ASSAY OF SEX HORMONE GLOBUL: CPT | Mod: 90 | Performed by: PATHOLOGY

## 2022-10-25 LAB
TESTOST FREE SERPL-MCNC: 1.86 NG/DL
TESTOST SERPL-MCNC: 138 NG/DL (ref 240–950)

## 2022-10-28 DIAGNOSIS — Z94.0 KIDNEY TRANSPLANTED: Primary | ICD-10-CM

## 2022-10-28 DIAGNOSIS — K21.9 GASTROESOPHAGEAL REFLUX DISEASE WITHOUT ESOPHAGITIS: ICD-10-CM

## 2022-10-31 DIAGNOSIS — Z94.0 KIDNEY TRANSPLANTED: Primary | ICD-10-CM

## 2022-10-31 DIAGNOSIS — K21.9 GASTROESOPHAGEAL REFLUX DISEASE WITHOUT ESOPHAGITIS: ICD-10-CM

## 2022-10-31 RX ORDER — PREDNISONE 5 MG/1
5 TABLET ORAL DAILY
Qty: 90 TABLET | Refills: 0 | Status: SHIPPED | OUTPATIENT
Start: 2022-10-31 | End: 2023-04-04

## 2022-11-01 ENCOUNTER — OFFICE VISIT (OUTPATIENT)
Dept: INTERNAL MEDICINE | Facility: CLINIC | Age: 62
End: 2022-11-01
Payer: COMMERCIAL

## 2022-11-01 VITALS
DIASTOLIC BLOOD PRESSURE: 87 MMHG | HEIGHT: 67 IN | SYSTOLIC BLOOD PRESSURE: 128 MMHG | HEART RATE: 72 BPM | OXYGEN SATURATION: 96 % | WEIGHT: 175 LBS | BODY MASS INDEX: 27.47 KG/M2

## 2022-11-01 DIAGNOSIS — K21.9 GASTROESOPHAGEAL REFLUX DISEASE WITHOUT ESOPHAGITIS: ICD-10-CM

## 2022-11-01 DIAGNOSIS — R06.2 WHEEZING: ICD-10-CM

## 2022-11-01 DIAGNOSIS — R06.09 DYSPNEA ON EXERTION: ICD-10-CM

## 2022-11-01 PROCEDURE — 99396 PREV VISIT EST AGE 40-64: CPT | Performed by: INTERNAL MEDICINE

## 2022-11-01 RX ORDER — PANTOPRAZOLE SODIUM 40 MG/1
TABLET, DELAYED RELEASE ORAL
Qty: 90 TABLET | Refills: 0 | OUTPATIENT
Start: 2022-11-01

## 2022-11-01 RX ORDER — FUROSEMIDE 20 MG
20 TABLET ORAL DAILY PRN
Qty: 90 TABLET | Refills: 1 | Status: SHIPPED | OUTPATIENT
Start: 2022-11-01

## 2022-11-01 RX ORDER — ALBUTEROL SULFATE 90 UG/1
2 AEROSOL, METERED RESPIRATORY (INHALATION) EVERY 6 HOURS PRN
Qty: 1 G | Refills: 11 | Status: SHIPPED | OUTPATIENT
Start: 2022-11-01

## 2022-11-01 RX ORDER — PANTOPRAZOLE SODIUM 40 MG/1
40 TABLET, DELAYED RELEASE ORAL DAILY
Qty: 90 TABLET | Refills: 3 | Status: SHIPPED | OUTPATIENT
Start: 2022-11-01 | End: 2024-03-26

## 2022-11-01 RX ORDER — FLUTICASONE PROPIONATE AND SALMETEROL 250; 50 UG/1; UG/1
1 POWDER RESPIRATORY (INHALATION) EVERY 12 HOURS
Qty: 180 EACH | Refills: 3 | Status: SHIPPED | OUTPATIENT
Start: 2022-11-01 | End: 2023-04-14

## 2022-11-01 NOTE — NURSING NOTE
Jerad Ross is a 62 year old male patient that presents today in clinic for the following:    Chief Complaint   Patient presents with     Physical     Colorectal screening, cardio med concerns, labs concerns      The patient's allergies and medications were reviewed as noted. A set of vitals were recorded as noted without incident. The patient does not have any other questions for the provider.    Osorio Fletcher, EMT at 1:13 PM on 11/1/2022

## 2022-11-01 NOTE — PROGRESS NOTES
HPI  62-year-old returns today to review his preventive medicine issues.  His colonoscopy is current he is due in 2025.  He did have adenomatous colon polyps found in 2020.  He is exercising somewhat regularly but does not have a plan for the winter and we discussed the use of a exercise bike for this.  He is current on his immunizations.  He is using furosemide 20 mg daily and feels that this is drying him out.  Feels as if he is a little dizzy and lightheaded and at times.  He is not having any significant fluid retention or swelling and we discussed changing this to as needed.  Past Medical History:   Diagnosis Date     Acute midline low back pain without sciatica      AION (acute ischemic optic neuropathy)      AION (acute ischemic optic neuropathy)      Anemia in chronic renal disease      Anemia in chronic renal disease      Avascular necrosis of bones of both hips (H)     s/p bilateral hip replacements     Avascular necrosis of bones of both hips (H)     s/p bilateral hip replacements     Basal cell carcinoma      Basal cell carcinoma      Chronic hepatitis B (H)      Chronic hepatitis B (H)      Clostridium difficile colitis      Clostridium difficile colitis      Coronary artery disease involving native coronary artery of native heart without angina pectoris 6/17/2014    Coronary angiogram 6/17/14: Severe distal 3-vessel disease involving small vessels, not amenable to PCI or CABG.     Coronary artery disease involving native coronary artery of native heart without angina pectoris 06/17/2014    Coronary angiogram 6/17/14: Severe distal 3-vessel disease involving small vessels, not amenable to PCI or CABG     CRP elevated      Depression      Depression      Diverticulosis      Diverticulosis      Dyslipidemia      Dyslipidemia      Elevated C-reactive protein (CRP)      FSGS (focal segmental glomerulosclerosis)      FSGS (focal segmental glomerulosclerosis)      Gastric ulcer with hemorrhage 2/12/12      Gastric ulcer with hemorrhage 02/12/2012     GERD (gastroesophageal reflux disease)      GERD (gastroesophageal reflux disease)      Glaucoma     OHTN     Glaucoma      Hemorrhoids      Hemorrhoids      HTN (hypertension)      Hyperlipidemia      Hypertension secondary to other renal disorders      Hypogonadism in male      Hypogonadism in male      Immunosuppressed status (H)      Kidney replaced by transplant     focal glomerulosclerosis      Kidney transplanted     focal glomerulosclerosis      NSTEMI (non-ST elevated myocardial infarction) (H) 6/17/2014     NSTEMI (non-ST elevated myocardial infarction) (H) 06/17/2014     JULIANE (obstructive sleep apnea)     Doesn't use CPAP     JULIANE (obstructive sleep apnea)      Paracentral scotoma     LE     Paracentral scotoma     LE     Secondary renal hyperparathyroidism (H)      Secondary renal hyperparathyroidism (H)      Septic bursitis      Squamous cell carcinoma      Squamous cell carcinoma      Past Surgical History:   Procedure Laterality Date     APPENDECTOMY       APPENDECTOMY       Cardiac Bypass surgery  06/08/2020    Standard's      CATARACT EXTRACTION EXTRACAPSULAR W/ INTRAOCULAR LENS IMPLANTATION Bilateral 4-20-10, 6-1-10     CATARACT IOL, RT/LT  4/19/2000    RE     CATARACT IOL, RT/LT  6/1/2000    LE     COLECTOMY PARTIAL  1983     10 cm, diverticulitis      COLECTOMY SUBTOTAL  1983    10 cm, diverticulitis     COLONOSCOPY  2/13/2012    Procedure:COLONOSCOPY; Surgeon:SLOAN GALLARDO; Location: [facility]     COLONOSCOPY N/A 1/22/2020    Procedure: Colonoscopy, With Polypectomy And Biopsy;  Surgeon: Aston Kiran MD;  Location:  GI     COLONOSCOPY  02/13/2012     CV CORONARY ANGIOGRAM N/A 6/2/2020    Procedure: Coronary Angiogram;  Surgeon: Alona Florentino MD;  Location: [facility] Cath Lab;  Service: Cardiology     CV CORONARY ANGIOGRAM N/A 9/20/2021    Procedure: Coronary Angiogram;  Surgeon: Adam Agudelo MD;  Location: [facility]  LAB CV     CV LEFT HEART CATHETERIZATION WITHOUT LEFT VENTRICULOGRAM Left 6/2/2020    Procedure: Left Heart Catheterization Without Left Ventriculogram;  Surgeon: Alona Florentino MD;  Location: [facility] Cath Lab;  Service: Cardiology     CV SUPRAVALVULAR AORTOGRAM N/A 9/20/2021    Procedure: Supravalvular Aortagram;  Surgeon: Adam Agudelo MD;  Location: [facility] CATH LAB CV     ESOPHAGOSCOPY, GASTROSCOPY, DUODENOSCOPY (EGD), COMBINED  2/13/2012    Procedure:COMBINED ESOPHAGOSCOPY, GASTROSCOPY, DUODENOSCOPY (EGD); Surgeon:SLOAN GALLARDO; Location: GI     ESOPHAGOSCOPY, GASTROSCOPY, DUODENOSCOPY (EGD), COMBINED  02/13/2012     EXTRACAPSULAR CATARACT EXTRATION WITH INTRAOCULAR LENS IMPLANT  4-20-10, 6-1-10    Rt, Lt     HERNIA REPAIR  1995    Lt inguinal     HERNIA REPAIR  1995    Lt inguinal     HIP SURGERY      1981, bilat MITZI, revised 2001, 2005     HIP SURGERY  1981    bilat MITZI, revised 2001, 2005     KIDNEY SURGERY  1978 and 1993    transplant     KIDNEY SURGERY  1978, 1993    transplant     KNEE SURGERY  1983, 1987    bilat TKA     KNEE SURGERY Bilateral 1983, 1987     MOHS MICROGRAPHIC PROCEDURE       MOHS MICROGRAPHIC PROCEDURE       OTHER SURGICAL HISTORY      kidney [id number], 1993     PHACOEMULSIFICATION CLEAR CORNEA WITH STANDARD INTRAOCULAR LENS IMPLANT Right 04/19/2000     PHACOEMULSIFICATION CLEAR CORNEA WITH STANDARD INTRAOCULAR LENS IMPLANT Left 06/01/2000     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, auxiliary spleen     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, auxiliary spleen     TONSILLECTOMY       TONSILLECTOMY       Family History   Problem Relation Age of Onset     Cardiovascular Father         AI with valve repair     Hypertension Father      Kidney Disease Father      Other - See Comments Father         AI with valve repair     Cerebrovascular Disease Maternal Grandfather      Other - See Comments Maternal Grandfather         cerebrovascular disease     Cancer Paternal  "Grandmother         ovarian ca     Ovarian Cancer Paternal Grandmother      Cerebrovascular Disease Paternal Grandfather      Kidney Disease Paternal Aunt      Kidney Disease Paternal Aunt      Skin Cancer No family hx of      Glaucoma No family hx of      Macular Degeneration No family hx of      Amblyopia No family hx of      Melanoma No family hx of      Diabetes No family hx of          Exam:  /87 (BP Location: Right arm, Patient Position: Sitting, Cuff Size: Adult Regular)   Pulse 72   Ht 1.702 m (5' 7.01\")   Wt 79.4 kg (175 lb)   SpO2 96%   BMI 27.40 kg/m    175 lbs 0 oz  The patient is alert, oriented with a clear sensorium.   Skin shows numerous seborrheic keratotic lesions   Head is normocephalic and atraumatic.    Neck shows no nodes, thyromegaly.     Lungs are clear.   Heart shows normal S1 and S2 without murmur or gallop.    Extremities show no edema.      ASSESSMENT  1 hypotestosterone unresponsive to replacement and stopped  2 IGT  3 Chronic Hepatitis B on entecavir  4 depression in remission  5 hyperlipidemia on rosuvastatin  6 JULIANE no CPAP  7 S/P kidney transplant  8 CAD S/P CABG 2020 asymptomatic  9 hypertension well controlled  10 history colon polyps due for colonoscopy in 2025  11 IGT    Plan  We discussed exercise and diet in regards to his prediabetes.  He will monitor his weights and use the furosemide on an as-needed basis for fluid retention or apparent weight gain.  Otherwise we will continue same medications plan for reassessment in a year  This note was completed using Dragon voice recognition software.      Aston Perry MD  General Internal Medicine  [facility]  [phone]        "

## 2022-11-09 ENCOUNTER — MYC MEDICAL ADVICE (OUTPATIENT)
Dept: INTERNAL MEDICINE | Facility: CLINIC | Age: 62
End: 2022-11-09

## 2022-11-09 DIAGNOSIS — G47.33 OSA (OBSTRUCTIVE SLEEP APNEA): Primary | ICD-10-CM

## 2022-11-29 ENCOUNTER — VIRTUAL VISIT (OUTPATIENT)
Dept: SLEEP MEDICINE | Facility: CLINIC | Age: 62
End: 2022-11-29
Attending: INTERNAL MEDICINE
Payer: COMMERCIAL

## 2022-11-29 VITALS — HEIGHT: 68 IN | WEIGHT: 170 LBS | BODY MASS INDEX: 25.76 KG/M2

## 2022-11-29 DIAGNOSIS — G47.33 OSA (OBSTRUCTIVE SLEEP APNEA): ICD-10-CM

## 2022-11-29 PROBLEM — N18.6 END STAGE RENAL DISEASE (H): Status: ACTIVE | Noted: 2022-11-29

## 2022-11-29 PROBLEM — M46.26 INFECTION OF LUMBAR SPINE (H): Status: RESOLVED | Noted: 2018-11-04 | Resolved: 2022-11-29

## 2022-11-29 PROCEDURE — 99205 OFFICE O/P NEW HI 60 MIN: CPT | Mod: 95 | Performed by: INTERNAL MEDICINE

## 2022-11-29 ASSESSMENT — SLEEP AND FATIGUE QUESTIONNAIRES
HOW LIKELY ARE YOU TO NOD OFF OR FALL ASLEEP WHILE LYING DOWN TO REST IN THE AFTERNOON WHEN CIRCUMSTANCES PERMIT: HIGH CHANCE OF DOZING
HOW LIKELY ARE YOU TO NOD OFF OR FALL ASLEEP WHILE WATCHING TV: MODERATE CHANCE OF DOZING
HOW LIKELY ARE YOU TO NOD OFF OR FALL ASLEEP WHILE SITTING QUIETLY AFTER LUNCH WITHOUT ALCOHOL: SLIGHT CHANCE OF DOZING
HOW LIKELY ARE YOU TO NOD OFF OR FALL ASLEEP WHILE SITTING AND TALKING TO SOMEONE: WOULD NEVER DOZE
HOW LIKELY ARE YOU TO NOD OFF OR FALL ASLEEP WHILE SITTING INACTIVE IN A PUBLIC PLACE: SLIGHT CHANCE OF DOZING
HOW LIKELY ARE YOU TO NOD OFF OR FALL ASLEEP WHEN YOU ARE A PASSENGER IN A CAR FOR AN HOUR WITHOUT A BREAK: SLIGHT CHANCE OF DOZING
HOW LIKELY ARE YOU TO NOD OFF OR FALL ASLEEP IN A CAR, WHILE STOPPED FOR A FEW MINUTES IN TRAFFIC: WOULD NEVER DOZE
HOW LIKELY ARE YOU TO NOD OFF OR FALL ASLEEP WHILE SITTING AND READING: SLIGHT CHANCE OF DOZING

## 2022-11-29 ASSESSMENT — PAIN SCALES - GENERAL: PAINLEVEL: NO PAIN (0)

## 2022-11-29 NOTE — PATIENT INSTRUCTIONS
"      MY TREATMENT INFORMATION FOR SLEEP APNEA-  Jerad Ross    DOCTOR : BETH HUNT MD    Am I having a sleep study at a sleep center?  --->Due to normal delays, you will be contacted within 2-4 weeks to schedule    Am I having a home sleep study?  --->Watch the video for the device you are using:        Frequently asked questions:  1. What is Obstructive Sleep Apnea (JULIANE)? JULIANE is the most common type of sleep apnea. Apnea means, \"without breath.\"  Apnea is most often caused by narrowing or collapse of the upper airway as muscles relax during sleep.   Almost everyone has occasional apneas. Most people with sleep apnea have had brief interruptions at night frequently for many years.  The severity of sleep apnea is related to how frequent and severe the events are.   2. What are the consequences of JULIANE? Symptoms include: feeling sleepy during the day, snoring loudly, gasping or stopping of breathing, trouble sleeping, and occasionally morning headaches or heartburn at night.  Sleepiness can be serious and even increase the risk of falling asleep while driving. Other health consequences may include development of high blood pressure and other cardiovascular disease in persons who are susceptible. Untreated JULIANE  can contribute to heart disease, stroke and diabetes.   3. What are the treatment options? In most situations, sleep apnea is a lifelong disease that must be managed with daily therapy. Medications are not effective for sleep apnea and surgery is generally not considered until other therapies have been tried. Your treatment is your choice . Continuous Positive Airway (CPAP) works right away and is the therapy that is effective in nearly everyone. An oral device to hold your jaw forward is usually the next most reliable option. Other options include postioning devices (to keep you off your back), weight loss, and surgery including a tongue pacing device. There is more detail about some of these options " below.  4. Are my sleep studies covered by insurance? Although we will request verification of coverage, we advise you also check in advance of the study to ensure there is coverage.    Important tips for those choosing CPAP and similar devices   Know your equipment:  CPAP is continuous positive airway pressure that prevents obstructive sleep apnea by keeping the throat from collapsing while you are sleeping. In most cases, the device is  smart  and can slowly self-adjusts if your throat collapses and keeps a record every day of how well you are treated-this information is available to you and your care team.  BPAP is bilevel positive airway pressure that keeps your throat open and also assists each breath with a pressure boost to maintain adequate breathing.  Special kinds of BPAP are used in patients who have inadequate breathing from lung or heart disease. In most cases, the device is  smart  and can slowly self-adjusts to assist breathing. Like CPAP, the device keeps a record of how well you are treated.  Your mask is your connection to the device. You get to choose what feels most comfortable and the staff will help to make sure if fits. Here: are some examples of the different masks that are available:       Key points to remember on your journey with sleep apnea:  Sleep study.  PAP devices often need to be adjusted during a sleep study to show that they are effective and adjusted right.  Good tips to remember: Try wearing just the mask during a quiet time during the day so your body adapts to wearing it. A humidifier is recommended for comfort in most cases to prevent drying of your nose and throat. Allergy medication from your provider may help you if you are having nasal congestion.  Getting settled-in. It takes more than one night for most of us to get used to wearing a mask. Try wearing just the mask during a quiet time during the day so your body adapts to wearing it. A humidifier is recommended for  comfort in most cases. Our team will work with you carefully on the first day and will be in contact within 4 days and again at 2 and 4 weeks for advice and remote device adjustments. Your therapy is evaluated by the device each day.   Use it every night. The more you are able to sleep naturally for 7-8 hours, the more likely you will have good sleep and to prevent health risks or symptoms from sleep apnea. Even if you use it 4 hours it helps. Occasionally all of us are unable to use a medical therapy, in sleep apnea, it is not dangerous to miss one night.   Communicate. Call our skilled team on the number provided on the first day if your visit for problems that make it difficult to wear the device. Over 2 out of 3 patients can learn to wear the device long-term with help from our team. Remember to call our team or your sleep providers if you are unable to wear the device as we may have other solutions for those who cannot adapt to mask CPAP therapy. It is recommended that you sleep your sleep provider within the first 3 months and yearly after that if you are not having problems.   Use it for your health. We encourage use of CPAP masks during daytime quiet periods to allow your face and brain to adapt to the sensation of CPAP so that it will be a more natural sensation to awaken to at night or during naps. This can be very useful during the first few weeks or months of adapting to CPAP though it does not help medically to wear CPAP during wakefulness and  should not be used as a strategy just to meet guidelines.  Take care of your equipment. Make sure you clean your mask and tubing using directions every day and that your filter and mask are replaced as recommended or if they are not working.     BESIDES CPAP, WHAT OTHER THERAPIES ARE THERE?    Positioning Device  Positioning devices are generally used when sleep apnea is mild and only occurs on your back.This example shows a pillow that straps around the waist.  It may be appropriate for those whose sleep study shows milder sleep apnea that occurs primarily when lying flat on one's back. Preliminary studies have shown benefit but effectiveness at home may need to be verified by a home sleep test. These devices are generally not covered by medical insurance.  Examples of devices that maintain sleeping on the back to prevent snoring and mild sleep apnea.    Belt type body positioner  http://Achieve[employer] Onlineosa.com/    Electronic reminder  http://nightshifttherapy.com/            Oral Appliance  What is oral appliance therapy?  An oral appliance device fits on your teeth at night like a retainer used after having braces. The device is made by a specialized dentist and requires several visits over 1-2 months before a manufactured device is made to fit your teeth and is adjusted to prevent your sleep apnea. Once an oral device is working properly, snoring should be improved. A home sleep test may be recommended at that time if to determine whether the sleep apnea is adequately treated.       Some things to remember:  -Oral devices are often, but not always, covered by your medical insurance. Be sure to check with your insurance provider.   -If you are referred for oral therapy, you will be given a list of specialized dentists to consider or you may choose to visit the Web site of the American Academy of Dental Sleep Medicine  -Oral devices are less likely to work if you have severe sleep apnea or are extremely overweight.     More detailed information  An oral appliance is a small acrylic device that fits over the upper and lower teeth  (similar to a retainer or a mouth guard). This device slightly moves jaw forward, which moves the base of the tongue forward, opens the airway, improves breathing for effective treat snoring and obstructive sleep apnea in perhaps 7 out of 10 people .  The best working devices are custom-made by a dental device  after a mold is made of the teeth  1, 2, 3.  When is an oral appliance indicated?  Oral appliance therapy is recommended as a first-line treatment for patients with primary snoring, mild sleep apnea, and for patients with moderate sleep apnea who prefer appliance therapy to use of CPAP4, 5. Severity of sleep apnea is determined by sleep testing and is based on the number of respiratory events per hour of sleep.   How successful is oral appliance therapy?  The success rate of oral appliance therapy in patients with mild sleep apnea is 75-80% while in patients with moderate sleep apnea it is 50-70%. The chance of success in patients with severe sleep apnea is 40-50%. The research also shows that oral appliances have a beneficial effect on the cardiovascular health of JULIANE patients at the same magnitude as CPAP therapy7.  Oral appliances should be a second-line treatment in cases of severe sleep apnea, but if not completely successful then a combination therapy utilizing CPAP plus oral appliance therapy may be effective. Oral appliances tend to be effective in a broad range of patients although studies show that the patients who have the highest success are females, younger patients, those with milder disease, and less severe obesity. 3, 6.   Finding a dentist that practices dental sleep medicine  Specific training is available through the American Academy of Dental Sleep Medicine for dentists interested in working in the field of sleep. To find a dentist who is educated in the field of sleep and the use of oral appliances, near you, visit the Web site of the American Academy of Dental Sleep Medicine.    References  1. Haylee, et al. Objectively measured vs self-reported compliance during oral appliance therapy for sleep-disordered breathing. Chest 2013; 144(5): 6467-8333.  2. Ambika et al. Objective measurement of compliance during oral appliance therapy for sleep-disordered breathing. Thorax 2013; 68(1): 91-96.  3. Lissett et al. Mandibular  advancement devices in 620 men and women with JULIANE and snoring: tolerability and predictors of treatment success. Chest 2004; 125: 8041-9392.  4. Clayton et al. Oral appliances for snoring and JULIANE: a review. Sleep 2006; 29: 244-262.  5. Vernon et al. Oral appliance treatment for JULIANE: an update. J Clin Sleep Med 2014; 10(2): 215-227.  6. Elen et al. Predictors of OSAH treatment outcome. J Dent Res 2007; 86: 7816-6890.      Weight Loss:    Weight loss is a long-term strategy that may improve sleep apnea in some patients.    Weight management is a personal decision and the decision should be based on your interest and the potential benefits.  If you are interested in exploring weight loss strategies, the following discussion covers the impact on weight loss on sleep apnea and the approaches that may be successful.    Being overweight does not necessarily mean you will have health consequences.  Those who have BMI over 35 or over 27 with existing medical conditions carries greater risk.   Weight loss decreases severity of sleep apnea in most people with obesity. For those with mild obesity who have developed snoring with weight gain, even 15-30 pound weight loss can improve and occasionally eliminate sleep apnea.  Structured and life-long dietary and health habits are necessary to lose weight and keep healthier weight levels.     Though there may be significant health benefits from weight loss, long-term weight loss is very difficult to achieve- studies show success with dietary management in less than 10% of people. In addition, substantial weight loss may require years of dietary control and may be difficult if patients have severe obesity. In these cases, surgical management may be considered.  Finally, older individuals who have tolerated obesity without health complications may be less likely to benefit from weight loss strategies.      [unfilled]    Surgery:    Surgery for obstructive sleep apnea is  considered generally only when other therapies fail to work. Surgery may be discussed with you if you are having a difficult time tolerating CPAP and or when there is an abnormal structure that requires surgical correction.  Nose and throat surgeries often enlarge the airway to prevent collapse.  Most of these surgeries create pain for 1-2 weeks and up to half of the most common surgeries are not effective throughout life.  You should carefully discuss the benefits and drawbacks to surgery with your sleep provider and surgeon to determine if it is the best solution for you.   More information  Surgery for JULIANE is directed at areas that are responsible for narrowing or complete obstruction of the airway during sleep.  There are a wide range of procedures available to enlarge and/or stabilize the airway to prevent blockage of breathing in the three major areas where it can occur: the palate, tongue, and nasal regions.  Successful surgical treatment depends on the accurate identification of the factors responsible for obstructive sleep apnea in each person.  A personalized approach is required because there is no single treatment that works well for everyone.  Because of anatomic variation, consultation with an examination by a sleep surgeon is a critical first step in determining what surgical options are best for each patient.  In some cases, examination during sedation may be recommended in order to guide the selection of procedures.  Patients will be counseled about risks and benefits as well as the typical recovery course after surgery. Surgery is typically not a cure for a person s JULIANE.  However, surgery will often significantly improve one s JULIANE severity (termed  success rate ).  Even in the absence of a cure, surgery will decrease the cardiovascular risk associated with OSA7; improve overall quality of life8 (sleepiness, functionality, sleep quality, etc).      Palate Procedures:  Patients with JULIANE often have  narrowing of their airway in the region of their tonsils and uvula.  The goals of palate procedures are to widen the airway in this region as well as to help the tissues resist collapse.  Modern palate procedure techniques focus on tissue conservation and soft tissue rearrangement, rather than tissue removal.  Often the uvula is preserved in this procedure. Residual sleep apnea is common in patient after pharyngoplasty with an average reduction in sleep apnea events of 33%2.      Tongue Procedures:  ExamWhile patients are awake, the muscles that surround the throat are active and keep this region open for breathing. These muscles relax during sleep, allowing the tongue and other structures to collapse and block breathing.  There are several different tongue procedures available.  Selection of a tongue base procedure depends on characteristics seen on physical exam.  Generally, procedures are aimed at removing bulky tissues in this area or preventing the back of the tongue from falling back during sleep.  Success rates for tongue surgery range from 50-62%3.    Hypoglossal Nerve Stimulation:  Hypoglossal nerve stimulation has recently received approval from the [place] Food and Drug Administration for the treatment of obstructive sleep apnea.  This is based on research showing that the system was safe and effective in treating sleep apnea6.  Results showed that the median AHI score decreased 68%, from 29.3 to 9.0. This therapy uses an implant system that senses breathing patterns and delivers mild stimulation to airway muscles, which keeps the airway open during sleep.  The system consists of three fully implanted components: a small generator (similar in size to a pacemaker), a breathing sensor, and a stimulation lead.  Using a small handheld remote, a patient turns the therapy on before bed and off upon awakening.    Candidates for this device must be greater than 18 years of age, have moderate to severe JULIANE  (AHI between 15-65), BMI less than 35, have tried CPAP/oral appliance for at least 8 weeks without success, and have appropriate upper airway anatomy (determined by a sleep endoscopy performed by Dr. Brett Bianchi).    Hypoglossal Nerve Stimulation Pathway:    The sleep surgeon s office will work with the patient through the insurance prior-authorization process (including communications and appeals).    Nasal Procedures:  Nasal obstruction can interfere with nasal breathing during the day and night.  Studies have shown that relief of nasal obstruction can improve the ability of some patients to tolerate positive airway pressure therapy for obstructive sleep apnea1.  Treatment options include medications such as nasal saline, topical corticosteroid and antihistamine sprays, and oral medications such as antihistamines or decongestants. Non-surgical treatments can include external nasal dilators for selected patients. If these are not successful by themselves, surgery can improve the nasal airway either alone or in combination with these other options.      Combination Procedures:  Combination of surgical procedures and other treatments may be recommended, particularly if patients have more than one area of narrowing or persistent positional disease.  The success rate of combination surgery ranges from 66-80%2,3.    References  Susi LAZO. The Role of the Nose in Snoring and Obstructive Sleep Apnoea: An Update.  Eur Arch Otorhinolaryngol. 2011; 268: 1365-73.   Abhishek SM; Kemal JA; Ritu JR; Pallanch JF; Hodan MB; Luda SG; Rehan WALKERD. Surgical modifications of the upper airway for obstructive sleep apnea in adults: a systematic review and meta-analysis. SLEEP 2010;33(10):2371-2512. Kp BURKS. Hypopharyngeal surgery in obstructive sleep apnea: an evidence-based medicine review.  Arch Otolaryngol Head Neck Surg. 2006 Feb;132(2):206-13.  Issac YH1, Shukri Y, Larry BECK. The efficacy of anatomically based multilevel  surgery for obstructive sleep apnea. Otolaryngol Head Neck Surg. 2003 Oct;129(4):327-35.  Kp BURKS, Goldberg A. Hypopharyngeal Surgery in Obstructive Sleep Apnea: An Evidence-Based Medicine Review. Arch Otolaryngol Head Neck Surg. 2006 Feb;132(2):206-13.  Mick MG et al. Upper-Airway Stimulation for Obstructive Sleep Apnea.  N Engl J Med. 2014 Jan 9;370(2):139-49.  Vida Y et al. Increased Incidence of Cardiovascular Disease in Middle-aged Men with Obstructive Sleep Apnea. Am J Respir Crit Care Med; 2002 166: 159-165  Reyes EM et al. Studying Life Effects and Effectiveness of Palatopharyngoplasty (SLEEP) study: Subjective Outcomes of Isolated Uvulopalatopharyngoplasty. Otolaryngol Head Neck Surg. 2011; 144: 623-631.        WHAT IF I ONLY HAVE SNORING?    Mandibular advancement devices, lateral sleep positioning, long-term weight loss and treatment of nasal allergies have been shown to improve snoring.  Exercising tongue muscles with a game (Huniettps://apps.test company/us/kiel/soundly-reduce-snoring/ko5707797115) or stimulating the tongue during the day with a device (https://doi.org/10.3390/cks18088011) have improved snoring in some individuals.    Remember to Drive Safe... Drive Alive     Sleep health profoundly affects your health, mood, and your safety.  Thirty three percent of the population (one in three of us) is not getting enough sleep and many have a sleep disorder. Not getting enough sleep or having an untreated / undertreated sleep condition may make us sleepy without even knowing it. In fact, our driving could be dramatically impaired due to our sleep health. As your provider, here are some things I would like you to know about driving:     Here are some warning signs for impairment and dangerous drowsy driving:              -Having been awake more than 16 hours               -Looking tired               -Eyelid drooping              -Head nodding (it could be too late at this point)               -Driving for more than 30 minutes     Some things you could do to make the driving safer if you are experiencing some drowsiness:              -Stop driving and rest              -Call for transportation              -Make sure your sleep disorder is adequately treated     Some things that have been shown NOT to work when experiencing drowsiness while driving:              -Turning on the radio              -Opening windows              -Eating any  distracting  /  entertaining  foods (e.g., sunflower seeds, candy, or any other)              -Talking on the phone      Your decision may not only impact your life, but also the life of others. Please, remember to drive safe for yourself and all of us.    .         CBT-I Introductory Module    Understanding Sleep and Insomnia    Most people have trouble sleeping at some point in their life.   Chronic insomnia means you have had trouble falling asleep and/or staying asleep for at least the past three months.  Despite allowing enough time for sleep, it is affecting how you feel. You are not alone.  It is estimated that 10-15% of adults experience chronic insomnia.     Cognitive Behavioral Therapy for Insomnia (CBT-I)    Consistent with the guidelines of the American College of Physicians, [facility] recommends  Cognitive Behavioral Therapy for Insomnia (CBT-I) as the first-line treatment for insomnia.  CBT-I targets factors that lead to long-term insomnia:    Behavioral Conditioning    When you lay awake in bed over many nights, your body actually becomes trained or 'conditioned' to be awake during the night.  It makes the bed associated with alertness instead of sleepiness.    Habits that weaken your body's sleep drive and circadian sleep rhythm    Changing sleep schedules, extended time in bed, using mobile devices and computers close to bedtime, and naps too late in the day can harm your sleep at night.    Emotional and Physical Arousal    Things such as  worrying about sleep, stress, drinking too much caffeine, and not winding down before bed can interfere with your body's natural sleep drive.    Understanding Sleep and Insomnia    [facility] CBT-I is a four-part program that teaches you the skills needed for a better night's sleep. The first step in your program is learning a bit about sleep and insomnia.      The Basics of Sleep    How does sleep help us?    Sleep, like food and water, is something we need every day. The purpose of sleep is still not exactly clear, but sleep experts agree we need consistent quality sleep to function at our best. There is evidence that sleep helps maintain brain and body functions.  It helps maintain thinking ability and mood.  Sleep is actually a very active part of life. Sleep occurs in four stages that cycle every  minutes throughout the night. We get our deepest sleep during the first few hours of sleep.  During the last half of our sleep, we usually get the bulk of our REM (Rapid Eye Movement) sleep.  REM sleep is when most of our dreaming occurs.    How much sleep do we need?    Sleep needs vary from person to person. Most adults need between 6-8 hours of sleep.  Sleeping too little or too much may be a health risk.  As we age, most people report their sleep gets lighter, earlier, shorter, and more restless.     What Controls Our Sleep?    The three things that regulate your sleep are your sleep drive, biological clock and your arousal system (emotional and physical).  Together these make us feel alert during the day and promote sleep at night.    Your sleep drive depends on how long you have been awake. It is lowest when you first wake up.  Sleep drive gradually increases as the day goes on.  The longer time you are awake the easier it is to fall asleep.  Sleeping gradually reduces your sleep drive. That is why napping in the evening or close to bed can make it harder to sleep at night.    Your biological clock  promotes wakefulness during the day and sleep at night.          From Jason et al. (2009). Evaluation and Management of Insomnia in the Psychiatric setting. Published Online: 19/1/2009; DOI: https://doi.org/10.1176/foc.7.4.wgf457FS      Understanding Insomnia    What causes insomnia?    Some people have a greater predisposition to developing insomnia due to genetics, personality or age. A host of things can trigger or precipitate it: jet lag, working a different shift, medication, or the onset of a medical or mental health condition.         Insomnia and Your Arousal System    Mental activity, emotions and physical symptoms can make your brain too active to sleep by masking the strength of your sleep drive. Your brain's arousal system not only triggers insomnia, it plays a role in maintaining it as well.  Common sources of arousal include:    Worry about sleep in bed  An active mind concerned about unfinished tasks  Anxiety, Stress and Depression  Pain     What perpetuates insomnia?    Short-term insomnia can become chronic when you begin to feel fearful, worried and  on guard  about sleep loss. As you spend more time in bed or try forcing yourself to sleep your bed becomes linked with wakefulness.  This is why people with insomnia commonly report feeling tired before bed but suddenly more alert when getting into bed.  This type of  conditioning  along with unhelpful sleep habits maintains the insomnia even when the triggering event has resolved.    Tracking your Sleep    Sleep tracking is an essential part of training yourself to sleep better and monitoring your progress.  There are several ways to keep track of your sleep habits.     Insomnia  Osmin    The Insomnia  osmin is a convenient way to keep track of your sleep prior to and during treatment.  Simply download the free osmin on your Apple or Android phone and record your information each morning.   The data you enter should be your recollection of  the past nigh of sleep. Do not watch or monitor the clock in the middle of the night while keeping your sleep diary.     The kiel also includes training and sleep schedule recommendations.  We recommend you use only the tracking function unless instructed by your provider. You can email your data to yourself prior to your visit by using the [facility] User Data function found in the Settings Section.  It is important that you have your data available to review with your provider at the time of your visit.             [employer] Sleep Diary    You can also track your sleep using the [employer] paper sleep diary.  You can upload your sleep diary and send it via a reQwip message  or have it with you at the time of your visit.        Mobile and Wearable Sleep Tracking Devices    There are many sleep tracking devices and mobile applications that estimate the timing and quantity of sleep by measuring body movement.  Though many of these devices claim to measure the depth or stages of sleep, they cannot measure brain wave activity or other indicators required to determine the actual stages of sleep.  They are helpful in estimating the timing and total amount of time you sleep.    CBT-I and Sleeping Pills    Sleep medications can be helpful in the short-term but often stop working in the long-term.  Sleeping pills treat symptoms and not the underlying cause of insomnia.  They also can have side effects that last well into the day. Abruptly stopping sleeping pills can cause temporary rebound insomnia and lead to increased distress about sleeplessness.  This in turn strengthens the belief that pills are necessary for sleep.    Many patients choose to discontinue sleeping pills prior to beginning CBT-I.  You should talk to your prescribing provider before tapering or discontinuing sleep medication.  c

## 2022-11-29 NOTE — PROGRESS NOTES
Name: Jerad Ross MRN# [id number]   Age: 62 year old YOB: 1960     Date of Consultation: November 29, 2022  Consultation is requested by: Aston Perry MD  [address] [place] [id number] Aston Perry  Primary care provider: Aston Perry       Reason for Sleep Consult:     Jerad Ross is sent by Aston Perry for a sleep consultation regarding poor sleep         Assessment and Plan:     Summary Sleep Diagnoses:    Moderate obstructive sleep apnea    Insomnia complaint (MATHEW 21)   o Shoulder and hip pain  o Irregular sleep with prolonged napping     Comorbid Diagnoses:    Heart disease (2020 echo normal LV without inducible ischemia)  o Coronary artery disease post bypass graft  o Hypertension    Renal disease status post renal transplant (GFR> 90)  o Immunosuppression (mycophenolate)    Depression      Summary Recommendations(things to be done):    Polysomnography with follow-up virtual visit for therapy determination        Summary Counseling (items discussed):    Please review information in your after visit summary regarding treatment for sleep apnea as well as cognitive behavioral therapy for insomnia.  You will be called by our staff to schedule you for a sleep test at our testing facility as well as a follow-up visit to discuss next steps.    Medical Decision-making: Variation in daytime sleep schedule may largely dictate intermittent daytime sleepiness although untreated sleep apnea and pain associated wakefulness may contribute to intermittent insufficient sleep.  This patient is interested in trying therapy for obstructive sleep apnea if testing suggest significant disease and would also be interested in cognitive behavioral therapy after brief discussion during our meeting today.             HISTORY PRESENT ILLNESS:     Jerad Ross is a 62 year old year old with insomnia for >20 years with irregularity in his sleep habits with  chronic hip and shoulder pain associated disruption of sleep continuity and some mid sleep awakenings.  This gentleman has a prior history of moderate obstructive sleep apnea during a supine study in 2011 with a trial of CPAP terminated by mask intolerance.  He lives alone and has no knowledge of his snoring and is not aware of any perceived motor restlessness at night.  He generally feels poorly refreshed despite spending 9 or more hours in bed with perception of adequate sleep.  He is overcome by sleepiness intermittently requiring up to 3-hour daytime naps.  At other times he may be alert and generally awakens alert in the morning.    This patient is not having restless leg syndrome or  parasomnias.            PREVIOUS SLEEP TESTING: PSG supine only; 203 min 190#  DATE: 11/2/2011  AHI 17 (REM  83) without hypoxemia         SLEEP-WAKE SCHEDULE:      Work/School Days: Patient goes to school/work:     Usually gets into bed at  Variably 10:30- 12:30 wth one hour latency   Final awakening 8:30 am-9 am usually without alarm  5 naps per week    SLEEP DISRUPTIONS:      Breathing/Snoring  No observer      Movement:  Intermittent pain in shoulders or hips without sensory changes in the legs or excessive motor restlessness       Behaviors in Wakefulness/Sleep:  Dream enactment no  Sleep eating  no  Bruxism no                      CAFFEINE AND OTHER SUBSTANCES: 2 caffeines in the morning; no regular alcohol.           SCALES:       EPWORTH SLEEPINESS SCALE     Guidelines for Scoring/Interpretation:  Total score categories:  0-7 = No clinically significant insomnia   8-14 = Subthreshold insomnia   15-21 = Clinical insomnia (moderate severity)  22-28 = Clinical insomnia (severe)  Used via courtesy of www.myhealth.va.gov with permission from Artemio Morrow PhD., [facility]      STOP BANG     STOP BANG Questionnaire (  2008, the American Society of Anesthesiologists, Inc. [employer] 11/1/2022  "  B/P Clinic: 128/87   BMI Clinic: 27.4   Some encounter information is confidential and restricted. Go to Review Flowsheets activity to see all data.         GAD7    KIM-7  10/28/2020   1. Feeling nervous, anxious, or on edge 1   2. Not being able to stop or control worrying 0   3. Worrying too much about different things 0   4. Trouble relaxing 1   5. Being so restless that it is hard to sit still 0   6. Becoming easily annoyed or irritable 0   7. Feeling afraid, as if something awful might happen 0   KIM-7 Total Score 2   If you checked any problems, how difficult have they made it for you to do your work, take care of things at home, or get along with other people? Somewhat difficult   Some encounter information is confidential and restricted. Go to Review Flowsheets activity to see all data.         CAGE-AID    No flowsheet data found.    CAGE-AID reprinted with permission from the [place] [employer] Journal, FOX Connolly. and PAZ Merino, \"Conjoint screening questionnaires for alcohol and drug abuse\" [place] Medical Journal 94: 135-140, 1995.      PATIENT HEALTH QUESTIONNAIRE-9 (PHQ - 9)    PHQ-9 ([employer]) 8/12/2022   No Interest In Doing Things -   Feeling Depressed -   Trouble Sleeping -   Tired / No Energy -   No appetite or Over-Eating -   Feeling Bad about Self -   Trouble Concentrating -   Moving Slow or Restless -   Suicidal Thoughts -   Total Score -   1.  Little interest or pleasure in doing things -   2.  Feeling down, depressed, or hopeless -   3.  Trouble falling or staying asleep, or sleeping too much -   4.  Feeling tired or having little energy -   5.  Poor appetite or overeating -   6.  Feeling bad about yourself -   7.  Trouble concentrating -   8.  Moving slowly or restless -   9.  Suicidal or self-harm thoughts -   PHQ-9 Total Score -   Difficulty at work, home, or with people -   1.  Little interest or pleasure in doing things Not at all   2.  Feeling down, depressed, or hopeless Several days "   3.  Trouble falling or staying asleep, or sleeping too much Several days   4.  Feeling tired or having little energy Several days   5.  Poor appetite or overeating Several days   6.  Feeling bad about yourself Not at all   7.  Trouble concentrating Not at all   8.  Moving slowly or restless Not at all   9.  Suicidal or self-harm thoughts Not at all   PHQ-9 via [employer] TOTAL SCORE-----> 4 (Minimal depression)   Difficulty at work, home, or with people Not difficult at all   Some encounter information is confidential and restricted. Go to Review Flowsheets activity to see all data.       Developed by Fausto Mahan, Sujatha Chakraborty, Arnol Hawkins and colleagues, with an educational marjan from [employer] No permission required to reproduce, translate, display or distribute.        Allergies:    Allergies   Allergen Reactions     Penicillins Shortness Of Breath and Hives     Keflex [Cephalexin Hcl] Other (See Comments)     Pt could not recall reaction     Tetracycline Other (See Comments)     Patient could not recall reaction     Sulfa Drugs Rash            Problem List:     Patient Active Problem List   Diagnosis     BCC (basal cell carcinoma), trunk     JULIANE (obstructive sleep apnea)     HTN, kidney transplant related     Coronary artery disease involving native coronary artery of native heart without angina pectoris     NSTEMI (non-ST elevated myocardial infarction) (H)     Depression     Diverticulosis     Hemorrhoids     Kidney replaced by transplant     Basal cell carcinoma     Squamous cell carcinoma     Dyslipidemia     CRP elevated     Glaucoma     AION (acute ischemic optic neuropathy)     Paracentral scotoma     Hip pain     Inflamed seborrheic keratosis     Intertrigo     Chronic hepatitis B (H)     Immunosuppression (H)     Hypogonadism in male     GERD (gastroesophageal reflux disease)     Aftercare following organ transplant     Acute midline low back pain without sciatica     Septic bursitis      Impaired mobility     Infection of lumbar spine (H)     S/P CABG (coronary artery bypass graft)     Abnormal cardiovascular stress test            MEDICATIONS:     Current Outpatient Medications   Medication Sig Dispense Refill     acetaminophen (TYLENOL) 325 MG tablet Take 1-2 tablets by mouth every 6 hours as needed.       albuterol (PROAIR HFA) 108 (90 Base) MCG/ACT inhaler Inhale 2 puffs into the lungs every 6 hours as needed for shortness of breath / dyspnea or wheezing 1 g 11     aspirin 81 MG tablet Take 81 mg by mouth daily        budesonide (PULMICORT FLEXHALER) 90 MCG/ACT inhaler Inhale 2 puffs into the lungs 2 times daily 1 each 8     calcium citrate-vitamin D (CITRACAL) 315-200 MG-UNIT TABS Take 1 tablet by mouth daily.       entecavir (BARACLUDE) 0.5 MG tablet Take 1 tablet (0.5 mg) by mouth daily APPT NEEDED FOR FURTHER REFILLS 90 tablet 3     FLUoxetine (PROZAC) 20 MG capsule Take 1 capsule (20 mg) by mouth daily With 40mg daily for a total daily dose of 60mg 90 capsule 1     FLUoxetine (PROZAC) 40 MG capsule Take 1 capsule (40 mg) by mouth daily 90 capsule 2     fluticasone-salmeterol (ADVAIR) 250-50 MCG/ACT inhaler Inhale 1 puff into the lungs every 12 hours 180 each 3     furosemide (LASIX) 20 MG tablet Take 1 tablet (20 mg) by mouth daily as needed (fluid retention) 90 tablet 1     isosorbide mononitrate (IMDUR) 30 MG 24 hr tablet TAKE 1 TABLET(30 MG) BY MOUTH DAILY 90 tablet 1     latanoprost (XALATAN) 0.005 % ophthalmic solution Place 1 drop into both eyes At Bedtime 2.5 mL 11     metoprolol tartrate (LOPRESSOR) 25 MG tablet TAKE 1/2 TABLET(12.5 MG) BY MOUTH TWICE DAILY 90 tablet 1     Multiple Vitamins-Iron (ONE DAILY MULTIVITAMIN/IRON) TABS Take 1 tablet by mouth daily       mycophenolate (GENERIC EQUIVALENT) 250 MG capsule Take 3 capsules (750 mg) by mouth 2 times daily 540 capsule 3     nitroGLYcerin (NITROSTAT) 0.4 MG sublingual tablet Place 0.4 mg under the tongue        Omega-3 Fatty  Acids (OMEGA 3 PO) Take 1 capsule by mouth daily Dose unknown       pantoprazole (PROTONIX) 40 MG EC tablet Take 1 tablet (40 mg) by mouth daily 90 tablet 3     polyethylene glycol (MIRALAX) 17 g packet Take 17 g by mouth daily as needed        potassium chloride ER (KLOR-CON M) 20 MEQ CR tablet Take 1 tablet (20 mEq) by mouth daily 100 tablet 1     predniSONE (DELTASONE) 5 MG tablet Take 1 tablet (5 mg) by mouth daily 90 tablet 0     rosuvastatin (CRESTOR) 20 MG tablet TAKE 1 TABLET(20 MG) BY MOUTH DAILY 90 tablet 1       Problem List:  Patient Active Problem List    Diagnosis Date Noted     Abnormal cardiovascular stress test 09/12/2021     Priority: Medium     S/P CABG (coronary artery bypass graft) 07/26/2021     Priority: Medium     Acute midline low back pain without sciatica 11/04/2018     Priority: Medium     Septic bursitis 11/04/2018     Priority: Medium     Impaired mobility 11/04/2018     Priority: Medium     Infection of lumbar spine (H) 11/04/2018     Priority: Medium     Aftercare following organ transplant 06/19/2017     Priority: Medium     Immunosuppression (H)      Priority: Medium     Hypogonadism in male      Priority: Medium     GERD (gastroesophageal reflux disease)      Priority: Medium     Chronic hepatitis B (H) 07/19/2016     Priority: Medium     Inflamed seborrheic keratosis 01/23/2016     Priority: Medium     Intertrigo 01/23/2016     Priority: Medium     Hip pain 06/19/2015     Priority: Medium     Depression      Priority: Medium     Diverticulosis      Priority: Medium     Hemorrhoids      Priority: Medium     Kidney replaced by transplant      Priority: Medium     Basal cell carcinoma      Priority: Medium     Squamous cell carcinoma      Priority: Medium     Dyslipidemia      Priority: Medium     CRP elevated      Priority: Medium     Glaucoma      Priority: Medium     OHTN       AION (acute ischemic optic neuropathy)      Priority: Medium     Paracentral scotoma      Priority:  Medium     LE       Coronary artery disease involving native coronary artery of native heart without angina pectoris 06/17/2014     Priority: Medium     Coronary angiogram 6/17/14: Severe distal 3-vessel disease involving small vessels, not amenable to PCI or CABG.       NSTEMI (non-ST elevated myocardial infarction) (H) 06/17/2014     Priority: Medium     6/17/14: Coronary angiogram showed diffuse 3-vessel disease involving small distal vessels, not amenable to revascularization. Started on medical therapy.        JULIANE (obstructive sleep apnea)      Priority: Medium     HTN, kidney transplant related      Priority: Medium     BCC (basal cell carcinoma), trunk 05/02/2012     Priority: Medium        Past Medical/Surgical History:  Past Medical History:   Diagnosis Date     Acute midline low back pain without sciatica      AION (acute ischemic optic neuropathy)      AION (acute ischemic optic neuropathy)      Anemia in chronic renal disease      Anemia in chronic renal disease      Avascular necrosis of bones of both hips (H)     s/p bilateral hip replacements     Avascular necrosis of bones of both hips (H)     s/p bilateral hip replacements     Basal cell carcinoma      Basal cell carcinoma      Chronic hepatitis B (H)      Chronic hepatitis B (H)      Clostridium difficile colitis      Clostridium difficile colitis      Coronary artery disease involving native coronary artery of native heart without angina pectoris 6/17/2014    Coronary angiogram 6/17/14: Severe distal 3-vessel disease involving small vessels, not amenable to PCI or CABG.     Coronary artery disease involving native coronary artery of native heart without angina pectoris 06/17/2014    Coronary angiogram 6/17/14: Severe distal 3-vessel disease involving small vessels, not amenable to PCI or CABG     CRP elevated      Depression      Depression      Diverticulosis      Diverticulosis      Dyslipidemia      Dyslipidemia      Elevated C-reactive protein  (CRP)      FSGS (focal segmental glomerulosclerosis)      FSGS (focal segmental glomerulosclerosis)      Gastric ulcer with hemorrhage 2/12/12     Gastric ulcer with hemorrhage 02/12/2012     GERD (gastroesophageal reflux disease)      GERD (gastroesophageal reflux disease)      Glaucoma     OHTN     Glaucoma      Hemorrhoids      Hemorrhoids      HTN (hypertension)      Hyperlipidemia      Hypertension secondary to other renal disorders      Hypogonadism in male      Hypogonadism in male      Immunosuppressed status (H)      Kidney replaced by transplant     focal glomerulosclerosis      Kidney transplanted     focal glomerulosclerosis      NSTEMI (non-ST elevated myocardial infarction) (H) 6/17/2014     NSTEMI (non-ST elevated myocardial infarction) (H) 06/17/2014     JULIANE (obstructive sleep apnea)     Doesn't use CPAP     JULIANE (obstructive sleep apnea)      Paracentral scotoma     LE     Paracentral scotoma     LE     Secondary renal hyperparathyroidism (H)      Secondary renal hyperparathyroidism (H)      Septic bursitis      Squamous cell carcinoma      Squamous cell carcinoma      Past Surgical History:   Procedure Laterality Date     APPENDECTOMY       APPENDECTOMY       Cardiac Bypass surgery  06/08/2020    Eleanor's      CATARACT EXTRACTION EXTRACAPSULAR W/ INTRAOCULAR LENS IMPLANTATION Bilateral 4-20-10, 6-1-10     CATARACT IOL, RT/LT  4/19/2000    RE     CATARACT IOL, RT/LT  6/1/2000    LE     COLECTOMY PARTIAL  1983     10 cm, diverticulitis      COLECTOMY SUBTOTAL  1983    10 cm, diverticulitis     COLONOSCOPY  2/13/2012    Procedure:COLONOSCOPY; Surgeon:SLOAN GALLARDO; Location: GI     COLONOSCOPY N/A 1/22/2020    Procedure: Colonoscopy, With Polypectomy And Biopsy;  Surgeon: Aston Kiran MD;  Location:  GI     COLONOSCOPY  02/13/2012     CV CORONARY ANGIOGRAM N/A 6/2/2020    Procedure: Coronary Angiogram;  Surgeon: Alona Florentino MD;  Location: [facility] Lab;   Service: Cardiology     CV CORONARY ANGIOGRAM N/A 9/20/2021    Procedure: Coronary Angiogram;  Surgeon: Adam Agudelo MD;  Location: [facility] CV     CV LEFT HEART CATHETERIZATION WITHOUT LEFT VENTRICULOGRAM Left 6/2/2020    Procedure: Left Heart Catheterization Without Left Ventriculogram;  Surgeon: Alona Florentino MD;  Location: [facility] Cath Lab;  Service: Cardiology     CV SUPRAVALVULAR AORTOGRAM N/A 9/20/2021    Procedure: Supravalvular Aortagram;  Surgeon: Adam Agudelo MD;  Location: [facility] CV     ESOPHAGOSCOPY, GASTROSCOPY, DUODENOSCOPY (EGD), COMBINED  2/13/2012    Procedure:COMBINED ESOPHAGOSCOPY, GASTROSCOPY, DUODENOSCOPY (EGD); Surgeon:SLOAN GALLARDO; Location: GI     ESOPHAGOSCOPY, GASTROSCOPY, DUODENOSCOPY (EGD), COMBINED  02/13/2012     EXTRACAPSULAR CATARACT EXTRATION WITH INTRAOCULAR LENS IMPLANT  4-20-10, 6-1-10    Rt, Lt     HERNIA REPAIR  1995    Lt inguinal     HERNIA REPAIR  1995    Lt inguinal     HIP SURGERY      1981, bilat MITZI, revised 2001, 2005     HIP SURGERY  1981    bilat MITZI, revised 2001, 2005     KIDNEY SURGERY  1978 and 1993    transplant     KIDNEY SURGERY  1978, 1993    transplant     KNEE SURGERY  1983, 1987    bilat TKA     KNEE SURGERY Bilateral 1983, 1987     MOHS MICROGRAPHIC PROCEDURE       MOHS MICROGRAPHIC PROCEDURE       OTHER SURGICAL HISTORY      kidney [id number], 1993     PHACOEMULSIFICATION CLEAR CORNEA WITH STANDARD INTRAOCULAR LENS IMPLANT Right 04/19/2000     PHACOEMULSIFICATION CLEAR CORNEA WITH STANDARD INTRAOCULAR LENS IMPLANT Left 06/01/2000     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, auxiliary spleen     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, auxiliary spleen     TONSILLECTOMY       TONSILLECTOMY         Social History:  Social History     Socioeconomic History     Marital status:      Spouse name: Not on file     Number of children: 0     Years of education: Not on file     Highest education level: Not on file    Occupational History     Employer: DISABLED     Occupation:      Employer: [employer]   Tobacco Use     Smoking status: Former     Packs/day: 1.00     Years: 9.00     Pack years: 9.00     Types: Cigarettes     Quit date: 1988     Years since quittin.9     Smokeless tobacco: Former   Substance and Sexual Activity     Alcohol use: Yes     Alcohol/week: 0.0 standard drinks     Comment: rarely 1 drink per month     Drug use: No     Sexual activity: Not on file   Other Topics Concern     Parent/sibling w/ CABG, MI or angioplasty before 65F 55M? Not Asked      Service Not Asked     Blood Transfusions Not Asked     Caffeine Concern Not Asked     Occupational Exposure Not Asked     Hobby Hazards Not Asked     Sleep Concern Not Asked     Stress Concern Not Asked     Weight Concern Not Asked     Special Diet No     Back Care Not Asked     Exercise Yes     Comment: walks     Bike Helmet Not Asked     Seat Belt Not Asked     Self-Exams Not Asked   Social History Narrative    . Wife is also a kidney transplant recipient.     Works part-time     Social Determinants of Health     Financial Resource Strain: Not on file   Food Insecurity: Not on file   Transportation Needs: Not on file   Physical Activity: Not on file   Stress: Not on file   Social Connections: Not on file   Intimate Partner Violence: Not on file   Housing Stability: Not on file       Family History:  Family History   Problem Relation Age of Onset     Cardiovascular Father         AI with valve repair     Hypertension Father      Kidney Disease Father      Other - See Comments Father         AI with valve repair     Cerebrovascular Disease Maternal Grandfather      Other - See Comments Maternal Grandfather         cerebrovascular disease     Cancer Paternal Grandmother         ovarian ca     Ovarian Cancer Paternal Grandmother      Cerebrovascular Disease Paternal Grandfather      Kidney Disease Paternal Aunt      Kidney  Disease Paternal Aunt      Skin Cancer No family hx of      Glaucoma No family hx of      Macular Degeneration No family hx of      Amblyopia No family hx of      Melanoma No family hx of      Diabetes No family hx of               Physical Examination:       GENERAL: Healthy, alert and no distress  EYES: Eyes grossly normal to inspection.  No discharge or erythema, or obvious scleral/conjunctival abnormalities.  RESP: No audible wheeze, cough, or visible cyanosis.  No visible retractions or increased work of breathing.    SKIN: Visible skin clear. No significant rash, abnormal pigmentation or lesions.  NEURO: Cranial nerves grossly intact.  Mentation and speech appropriate for age.  PSYCH: Mentation appears normal, affect normal/bright, judgement and insight intact, normal speech and appearance well-groomed.                  Data: All pertinent previous laboratory data reviewed     Recent Labs   Lab Test 10/21/22  0908 07/19/22  1036    141   POTASSIUM 4.0 3.2*   CHLORIDE 102 105   CO2 29 27   ANIONGAP 10 9   GLC 87 108*   BUN 14.1 15   CR 0.83 0.89   HEMA 10.1 9.8       Recent Labs   Lab Test 10/21/22  0908   WBC 7.3   RBC 5.00   HGB 14.3   HCT 45.4   MCV 91   MCH 28.6   MCHC 31.5   RDW 15.0          Recent Labs   Lab Test 07/19/22  1036   PROTTOTAL 7.4   ALBUMIN 3.5   BILITOTAL 1.3   ALKPHOS 74   AST 27   ALT 22       TSH (mU/L)   Date Value   09/02/2021 0.68   03/09/2021 1.17   11/07/2017 1.73       No results found for: UAMP, UBARB, BENZODIAZEUR, UCANN, UCOC, OPIT, UPCP    Ferritin   Date/Time Value Ref Range Status   01/08/2009 09:33 AM 76 20 - 300 ng/mL Final       pH Arterial (no units)   Date Value   06/09/2020 7.33 (L)   06/08/2020 7.29 (L)     pH (no units)   Date Value   06/09/2020 7.33 (L)     pO2 Arterial (mm Hg)   Date Value   06/09/2020 66 (L)   06/08/2020 71 (L)     pCO2 Arterial (mm Hg)   Date Value   06/09/2020 44   06/08/2020 55 (H)     Bicarbonate Arterial POCT (mmol/L)   Date Value    06/08/2020 21.0 (L)   06/08/2020 25.6     Base Excess/Deficit (+/-) (mmol/L)   Date Value   06/09/2020 -2.6   06/08/2020 -0.5       @LABRCNTIPR(phv:4,pco2v:4,po2v:4,hco3v:4,jeannie:4,o2per:4)@    Echocardiology: No results found for this or any previous visit (from the past 4320 hour(s)).    Chest x-ray: No results found for this or any previous visit from the past 365 days.      Chest CT: No results found for this or any previous visit from the past 365 days.      PFT: Most Recent [facility] Pulmonary Function Testing    FVC-Pred   Date Value Ref Range Status   04/17/2018 4.46 L      FVC-Pre   Date Value Ref Range Status   04/17/2018 2.83 L      FVC-%Pred-Pre   Date Value Ref Range Status   04/17/2018 63 %      FEV1-Pre   Date Value Ref Range Status   04/17/2018 2.05 L      FEV1-%Pred-Pre   Date Value Ref Range Status   04/17/2018 58 %      FEV1FVC-Pred   Date Value Ref Range Status   04/17/2018 76 %      FEV1FVC-Pre   Date Value Ref Range Status   04/17/2018 72 %      No results found for: 20029  FEFMax-Pred   Date Value Ref Range Status   04/17/2018 9.04 L/sec      FEFMax-Pre   Date Value Ref Range Status   04/17/2018 5.27 L/sec      FEFMax-%Pred-Pre   Date Value Ref Range Status   04/17/2018 58 %      ExpTime-Pre   Date Value Ref Range Status   04/17/2018 9.24 sec      FIFMax-Pre   Date Value Ref Range Status   04/17/2018 4.77 L/sec      FEV1FEV6-Pred   Date Value Ref Range Status   04/17/2018 80 %      FEV1FEV6-Pre   Date Value Ref Range Status   04/17/2018 74 %      No results found for: 20055      BETH HUNT MD 11/29/2022     Total time spent reviewing medical records including previous testing and interpretation as well as direct patient contact and documentation on this date:  60 minutes

## 2022-11-29 NOTE — PROGRESS NOTES
Jerad is a 62 year old who is being evaluated via a billable video visit.      How would you like to obtain your AVS? MyChart  If the video visit is dropped, the invitation should be resent by: Send to e-mail at: [email or web address]  Will anyone else be joining your video visit? Tonie Mendez      Video-Visit Details    Video Start Time: 1 PM    Type of service:  Video Visit    Video End Time:2:01 PM    Originating Location (pt. Location): Home        Distant Location (provider location):  Off-site    Platform used for Video Visit: [employer]

## 2022-12-30 DIAGNOSIS — E78.00 PURE HYPERCHOLESTEROLEMIA: ICD-10-CM

## 2022-12-30 DIAGNOSIS — I25.10 CORONARY ARTERY DISEASE INVOLVING NATIVE CORONARY ARTERY OF NATIVE HEART WITHOUT ANGINA PECTORIS: Primary | ICD-10-CM

## 2023-01-06 DIAGNOSIS — F33.41 RECURRENT MAJOR DEPRESSIVE DISORDER, IN PARTIAL REMISSION (H): ICD-10-CM

## 2023-01-06 RX ORDER — FLUOXETINE 40 MG/1
40 CAPSULE ORAL DAILY
Qty: 90 CAPSULE | Refills: 2 | Status: SHIPPED | OUTPATIENT
Start: 2023-01-06 | End: 2023-09-28

## 2023-01-06 NOTE — TELEPHONE ENCOUNTER
Last Seen: 8/12/2022  RTC: RTC: 6 months, sooner as needed  Cancel: none   No-Show: none   Next Appt: 2-    Incoming Refill From [facility] pharmacy via faxed     Medication Requested: FLUoxetine (PROZAC) 20 MG capsule  Directions: Sig - Route: Take 1 capsule (20 mg) by mouth daily With 40mg daily for a total daily dose of 60mg - Oral  Qty:  90  Last Refill: 12-6-2022    Medication Requested: FLUoxetine (PROZAC) 40 MG capsule  Directions: Sig - Route: Take 1 capsule (40 mg) by mouth daily - Oral  Qty: 90  Last Refill: 12-6-2022    Medication Refill pended for refills  Per Refill Protocol     Jodee Garay on 1/6/2023 at 1:58 PM

## 2023-01-10 NOTE — NURSING NOTE
My Chart message sent to patient:  Roderick Mars,    This is a quick message following up on your 11/29/2022 appointment with Dr. Sharif.  It looks like he ordered a sleep study and a follow-up appointment with him to go over the results of sleep study. If this is something you would like to move forward in scheduling, please call us at [phone].      Thank you!    [facility]  Meliza Ramirez CMA

## 2023-02-08 ENCOUNTER — OFFICE VISIT (OUTPATIENT)
Dept: CARDIOLOGY | Facility: CLINIC | Age: 63
End: 2023-02-08
Attending: INTERNAL MEDICINE
Payer: COMMERCIAL

## 2023-02-08 VITALS
RESPIRATION RATE: 16 BRPM | DIASTOLIC BLOOD PRESSURE: 82 MMHG | WEIGHT: 182 LBS | HEART RATE: 58 BPM | BODY MASS INDEX: 27.67 KG/M2 | SYSTOLIC BLOOD PRESSURE: 112 MMHG

## 2023-02-08 DIAGNOSIS — E78.00 PURE HYPERCHOLESTEROLEMIA: ICD-10-CM

## 2023-02-08 DIAGNOSIS — Z94.0 KIDNEY REPLACED BY TRANSPLANT: ICD-10-CM

## 2023-02-08 DIAGNOSIS — I25.83 CORONARY ARTERIOSCLEROSIS DUE TO LIPID RICH PLAQUE: Primary | ICD-10-CM

## 2023-02-08 DIAGNOSIS — N18.6 END STAGE RENAL DISEASE (H): ICD-10-CM

## 2023-02-08 DIAGNOSIS — G47.33 OSA (OBSTRUCTIVE SLEEP APNEA): ICD-10-CM

## 2023-02-08 DIAGNOSIS — I10 PRIMARY HYPERTENSION: ICD-10-CM

## 2023-02-08 DIAGNOSIS — Z95.1 S/P CABG (CORONARY ARTERY BYPASS GRAFT): ICD-10-CM

## 2023-02-08 PROCEDURE — 99214 OFFICE O/P EST MOD 30 MIN: CPT | Performed by: INTERNAL MEDICINE

## 2023-02-08 RX ORDER — NITROGLYCERIN 0.4 MG/1
0.4 TABLET SUBLINGUAL EVERY 5 MIN PRN
Qty: 20 TABLET | Refills: 3 | Status: SHIPPED | OUTPATIENT
Start: 2023-02-08

## 2023-02-08 NOTE — LETTER
2/8/2023    Aston Perry MD  [address] [address]    RE: Jerad Ross       Dear Colleague,     I had the pleasure of seeing Jerad Ross in the [facility].      [facility]  Heart Care Clinic Follow-up Note    Assessment & Plan        (I25.10,  I25.83) Coronary arteriosclerosis due to lipid rich plaque  (primary encounter diagnosis)  Comment:  Recent angiography 2021 secondary to abnormal nuclear stress test showed normal left main, mid LAD 95% lesion with a first diagonal 90% lesion.  The circumflex had a distal 75% lesion with a first obtuse marginal artery 99% stenosis.  The right coronary artery had a proximal 90% followed by mid 99% followed by PDA 80% lesion.    Doing well on Imdur.     (Z95.1) S/P CABG (coronary artery bypass graft)  Comment: June 8, 2020 patient underwent coronary bypass grafting with a LIMA to the LAD, vein graft to the right PDA, and vein graft to the right posterior lateral.  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  Symptomatically doing well and work on prevention.      (E78.00) Pure hypercholesterolemia  Comment: Total cholesterol 151 with an LDL of 67 which is acceptable    (I10) Primary hypertension  Comment: Under good control on metoprolol as well as Imdur    (Z94.0) Kidney replaced by transplant  Comment: This is a second graft, placed in 1993, currently on chronic prednisone and mycophenolate and doing well.    (N18.6) End stage renal disease (H)  Comment: Creatinine excellent at 0.81 and continue current meds per nephrology.    (G47.33) JULIANE (obstructive sleep apnea)  Comment: Intolerant of CPAP but doing well.    Plan  1.  Continue current meds.  2.  Follow-up with me in 1 year or sooner if needed.    Subjective  CC: 63-year-old white gentleman being seen in yearly follow-up.  Since I saw  him he called over the summer with some fatigue and chest discomfort and had a nuclear stress test which was unremarkable.  He states since then he had 1 episode of chest discomfort at rest, tightness, radiate to his shoulders, went away after an hour of nitroglycerin.  Otherwise living independently, doing some snow shoveling, no chest pains, palpitations, shortness of breath, PND, orthopnea, syncope, dizziness or peripheral edema.  We discussed that he is from [facility], [place] [facility].    Medications  Current Outpatient Medications   Medication Sig Dispense Refill     acetaminophen (TYLENOL) 325 MG tablet Take 1-2 tablets by mouth every 6 hours as needed.       albuterol (PROAIR HFA) 108 (90 Base) MCG/ACT inhaler Inhale 2 puffs into the lungs every 6 hours as needed for shortness of breath / dyspnea or wheezing 1 g 11     aspirin 81 MG tablet Take 81 mg by mouth daily        budesonide (PULMICORT FLEXHALER) 90 MCG/ACT inhaler Inhale 2 puffs into the lungs 2 times daily 1 each 8     calcium citrate-vitamin D (CITRACAL) 315-200 MG-UNIT TABS Take 1 tablet by mouth daily.       entecavir (BARACLUDE) 0.5 MG tablet Take 1 tablet (0.5 mg) by mouth daily APPT NEEDED FOR FURTHER REFILLS 90 tablet 3     FLUoxetine (PROZAC) 40 MG capsule Take 1 capsule (40 mg) by mouth daily 90 capsule 2     fluticasone-salmeterol (ADVAIR) 250-50 MCG/ACT inhaler Inhale 1 puff into the lungs every 12 hours 180 each 3     furosemide (LASIX) 20 MG tablet Take 1 tablet (20 mg) by mouth daily as needed (fluid retention) 90 tablet 1     isosorbide mononitrate (IMDUR) 30 MG 24 hr tablet TAKE 1 TABLET(30 MG) BY MOUTH DAILY 90 tablet 0     latanoprost (XALATAN) 0.005 % ophthalmic solution Place 1 drop into both eyes At Bedtime 2.5 mL 11     metoprolol tartrate (LOPRESSOR) 25 MG tablet TAKE 1/2 TABLET(12.5 MG) BY MOUTH TWICE DAILY 90 tablet 1     Multiple Vitamins-Iron (ONE DAILY MULTIVITAMIN/IRON) TABS Take 1 tablet by  mouth daily       mycophenolate (GENERIC EQUIVALENT) 250 MG capsule Take 3 capsules (750 mg) by mouth 2 times daily 540 capsule 3     nitroGLYcerin (NITROSTAT) 0.4 MG sublingual tablet Place 1 tablet (0.4 mg) under the tongue every 5 minutes as needed for chest pain 20 tablet 3     Omega-3 Fatty Acids (OMEGA 3 PO) Take 1 capsule by mouth daily Dose unknown       pantoprazole (PROTONIX) 40 MG EC tablet Take 1 tablet (40 mg) by mouth daily 90 tablet 3     polyethylene glycol (MIRALAX) 17 g packet Take 17 g by mouth daily as needed        potassium chloride ER (KLOR-CON M) 20 MEQ CR tablet Take 1 tablet (20 mEq) by mouth daily 100 tablet 1     predniSONE (DELTASONE) 5 MG tablet Take 1 tablet (5 mg) by mouth daily 90 tablet 0     rosuvastatin (CRESTOR) 20 MG tablet TAKE 1 TABLET(20 MG) BY MOUTH DAILY 90 tablet 0       Objective  /82 (BP Location: Right arm, Patient Position: Sitting, Cuff Size: Adult Regular)   Pulse 58   Resp 16   Wt 82.6 kg (182 lb)   BMI 27.67 kg/m      General Appearance:    Alert, cooperative, no distress, appears stated age   Head:    Normocephalic, without obvious abnormality, atraumatic   Throat:   Lips, mucosa, and tongue normal; teeth and gums normal   Neck:   Supple, symmetrical, trachea midline, no adenopathy;        thyroid:  No enlargement/tenderness/nodules; no carotid    bruit or JVD   Back:     Symmetric, no curvature, ROM normal, no CVA tenderness   Lungs:     Clear to auscultation bilaterally, respirations unlabored   Chest wall:    No tenderness, midline sternotomy scar   Heart:    Regular rate and rhythm, S1 and S2 normal, no murmur, rub   or gallop   Abdomen:     Soft, non-tender, bowel sounds active all four quadrants,     no masses, no organomegaly   Extremities:   Normal, atraumatic, no cyanosis or edema   Pulses:   2+ and symmetric all extremities   Skin:   Skin color, texture, turgor normal, no rashes or lesions     Results    Lab Results personally reviewed   Lab  Results   Component Value Date    CHOL 151 10/21/2022    CHOL 133 09/20/2021     Lab Results   Component Value Date    HDL 65 10/21/2022    HDL 49 09/20/2021     No components found for: LDLCALC  Lab Results   Component Value Date    TRIG 94 10/21/2022    TRIG 108 09/20/2021     Lab Results   Component Value Date    WBC 7.3 10/21/2022    HGB 14.3 10/21/2022    HCT 45.4 10/21/2022     10/21/2022     Lab Results   Component Value Date    BUN 14.1 10/21/2022     10/21/2022    CO2 29 10/21/2022             Thank you for allowing me to participate in the care of your patient.      Sincerely,     RENNY LUNDBERG MD     [facility]  cc:   Renny Lundberg MD  [address], [address]  [place] [address]

## 2023-02-08 NOTE — PATIENT INSTRUCTIONS
Mr Jerad Ross,  I enjoyed visiting with you again today.  I am glad to hear you are doing well.  Per our conversation keep up the same meds.  I will plan on seeing you 1 year or sooner if needed.  Sascha Lundberg

## 2023-02-08 NOTE — PROGRESS NOTES
[facility]  Heart Care Clinic Follow-up Note    Assessment & Plan        (I25.10,  I25.83) Coronary arteriosclerosis due to lipid rich plaque  (primary encounter diagnosis)  Comment:  Recent angiography 2021 secondary to abnormal nuclear stress test showed normal left main, mid LAD 95% lesion with a first diagonal 90% lesion.  The circumflex had a distal 75% lesion with a first obtuse marginal artery 99% stenosis.  The right coronary artery had a proximal 90% followed by mid 99% followed by PDA 80% lesion.    Doing well on Imdur.     (Z95.1) S/P CABG (coronary artery bypass graft)  Comment: June 8, 2020 patient underwent coronary bypass grafting with a LIMA to the LAD, vein graft to the right PDA, and vein graft to the right posterior lateral.  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  Symptomatically doing well and work on prevention.      (E78.00) Pure hypercholesterolemia  Comment: Total cholesterol 151 with an LDL of 67 which is acceptable    (I10) Primary hypertension  Comment: Under good control on metoprolol as well as Imdur    (Z94.0) Kidney replaced by transplant  Comment: This is a second graft, placed in 1993, currently on chronic prednisone and mycophenolate and doing well.    (N18.6) End stage renal disease (H)  Comment: Creatinine excellent at 0.81 and continue current meds per nephrology.    (G47.33) JULIANE (obstructive sleep apnea)  Comment: Intolerant of CPAP but doing well.    Plan  1.  Continue current meds.  2.  Follow-up with me in 1 year or sooner if needed.    Subjective  CC: 63-year-old white gentleman being seen in yearly follow-up.  Since I saw him he called over the summer with some fatigue and chest discomfort and had a nuclear stress test which was unremarkable.  He states since then he had 1 episode of chest discomfort at rest, tightness,  radiate to his shoulders, went away after an hour of nitroglycerin.  Otherwise living independently, doing some snow shoveling, no chest pains, palpitations, shortness of breath, PND, orthopnea, syncope, dizziness or peripheral edema.  We discussed that he is from [facility], [place] the [facility].    Medications  Current Outpatient Medications   Medication Sig Dispense Refill     acetaminophen (TYLENOL) 325 MG tablet Take 1-2 tablets by mouth every 6 hours as needed.       albuterol (PROAIR HFA) 108 (90 Base) MCG/ACT inhaler Inhale 2 puffs into the lungs every 6 hours as needed for shortness of breath / dyspnea or wheezing 1 g 11     aspirin 81 MG tablet Take 81 mg by mouth daily        budesonide (PULMICORT FLEXHALER) 90 MCG/ACT inhaler Inhale 2 puffs into the lungs 2 times daily 1 each 8     calcium citrate-vitamin D (CITRACAL) 315-200 MG-UNIT TABS Take 1 tablet by mouth daily.       entecavir (BARACLUDE) 0.5 MG tablet Take 1 tablet (0.5 mg) by mouth daily APPT NEEDED FOR FURTHER REFILLS 90 tablet 3     FLUoxetine (PROZAC) 40 MG capsule Take 1 capsule (40 mg) by mouth daily 90 capsule 2     fluticasone-salmeterol (ADVAIR) 250-50 MCG/ACT inhaler Inhale 1 puff into the lungs every 12 hours 180 each 3     furosemide (LASIX) 20 MG tablet Take 1 tablet (20 mg) by mouth daily as needed (fluid retention) 90 tablet 1     isosorbide mononitrate (IMDUR) 30 MG 24 hr tablet TAKE 1 TABLET(30 MG) BY MOUTH DAILY 90 tablet 0     latanoprost (XALATAN) 0.005 % ophthalmic solution Place 1 drop into both eyes At Bedtime 2.5 mL 11     metoprolol tartrate (LOPRESSOR) 25 MG tablet TAKE 1/2 TABLET(12.5 MG) BY MOUTH TWICE DAILY 90 tablet 1     Multiple Vitamins-Iron (ONE DAILY MULTIVITAMIN/IRON) TABS Take 1 tablet by mouth daily       mycophenolate (GENERIC EQUIVALENT) 250 MG capsule Take 3 capsules (750 mg) by mouth 2 times daily 540 capsule 3     nitroGLYcerin (NITROSTAT) 0.4 MG sublingual tablet Place 1 tablet  (0.4 mg) under the tongue every 5 minutes as needed for chest pain 20 tablet 3     Omega-3 Fatty Acids (OMEGA 3 PO) Take 1 capsule by mouth daily Dose unknown       pantoprazole (PROTONIX) 40 MG EC tablet Take 1 tablet (40 mg) by mouth daily 90 tablet 3     polyethylene glycol (MIRALAX) 17 g packet Take 17 g by mouth daily as needed        potassium chloride ER (KLOR-CON M) 20 MEQ CR tablet Take 1 tablet (20 mEq) by mouth daily 100 tablet 1     predniSONE (DELTASONE) 5 MG tablet Take 1 tablet (5 mg) by mouth daily 90 tablet 0     rosuvastatin (CRESTOR) 20 MG tablet TAKE 1 TABLET(20 MG) BY MOUTH DAILY 90 tablet 0       Objective  /82 (BP Location: Right arm, Patient Position: Sitting, Cuff Size: Adult Regular)   Pulse 58   Resp 16   Wt 82.6 kg (182 lb)   BMI 27.67 kg/m      General Appearance:    Alert, cooperative, no distress, appears stated age   Head:    Normocephalic, without obvious abnormality, atraumatic   Throat:   Lips, mucosa, and tongue normal; teeth and gums normal   Neck:   Supple, symmetrical, trachea midline, no adenopathy;        thyroid:  No enlargement/tenderness/nodules; no carotid    bruit or JVD   Back:     Symmetric, no curvature, ROM normal, no CVA tenderness   Lungs:     Clear to auscultation bilaterally, respirations unlabored   Chest wall:    No tenderness, midline sternotomy scar   Heart:    Regular rate and rhythm, S1 and S2 normal, no murmur, rub   or gallop   Abdomen:     Soft, non-tender, bowel sounds active all four quadrants,     no masses, no organomegaly   Extremities:   Normal, atraumatic, no cyanosis or edema   Pulses:   2+ and symmetric all extremities   Skin:   Skin color, texture, turgor normal, no rashes or lesions     Results    Lab Results personally reviewed   Lab Results   Component Value Date    CHOL 151 10/21/2022    CHOL 133 09/20/2021     Lab Results   Component Value Date    HDL 65 10/21/2022    HDL 49 09/20/2021     No components found for: LDLCALC  Lab  Results   Component Value Date    TRIG 94 10/21/2022    TRIG 108 09/20/2021     Lab Results   Component Value Date    WBC 7.3 10/21/2022    HGB 14.3 10/21/2022    HCT 45.4 10/21/2022     10/21/2022     Lab Results   Component Value Date    BUN 14.1 10/21/2022     10/21/2022    CO2 29 10/21/2022

## 2023-02-10 ENCOUNTER — VIRTUAL VISIT (OUTPATIENT)
Dept: PSYCHIATRY | Facility: CLINIC | Age: 63
End: 2023-02-10
Attending: NURSE PRACTITIONER
Payer: COMMERCIAL

## 2023-02-10 DIAGNOSIS — F33.41 RECURRENT MAJOR DEPRESSIVE DISORDER, IN PARTIAL REMISSION (H): ICD-10-CM

## 2023-02-10 PROCEDURE — 99442 PR PHYSICIAN TELEPHONE EVALUATION 11-20 MIN: CPT | Mod: 93 | Performed by: NURSE PRACTITIONER

## 2023-02-10 ASSESSMENT — PATIENT HEALTH QUESTIONNAIRE - PHQ9
10. IF YOU CHECKED OFF ANY PROBLEMS, HOW DIFFICULT HAVE THESE PROBLEMS MADE IT FOR YOU TO DO YOUR WORK, TAKE CARE OF THINGS AT HOME, OR GET ALONG WITH OTHER PEOPLE: SOMEWHAT DIFFICULT
SUM OF ALL RESPONSES TO PHQ QUESTIONS 1-9: 4
SUM OF ALL RESPONSES TO PHQ QUESTIONS 1-9: 4

## 2023-02-10 NOTE — PATIENT INSTRUCTIONS
**For crisis resources, please see the information at the end of this document**   Patient Education    Thank you for coming to the [facility] [place] [facility].     Lab Testing:  If you had lab testing today and your results are reassuring or normal they will be mailed to you or sent through untapt within 7 days. If the lab tests need quick action we will call you with the results. The phone number we will call with results is # [phone]. If this is not the best number please call our clinic and change the number.     Medication Refills:  If you need any refills please call your pharmacy and they will contact us. Our fax number for refills is [phone].   Three business days of notice are needed for general medication refill requests.   Five business days of notice are needed for controlled substance refill requests.   If you need to change to a different pharmacy, please contact the new pharmacy directly. The new pharmacy will help you get your medications transferred.     Contact Us:  Please call [phone] during business hours (8-5:00 M-F).   If you have medication related questions after clinic hours, or on the weekend, please call [phone].     Financial Assistance [phone]   Medical Records [phone]       MENTAL HEALTH CRISIS RESOURCES:  For a emergency help, please call 911 or go to the nearest Emergency Department.     Emergency Walk-In Options:   EmPATH Unit @ [facility] (English): [phone] - Specialized mental health emergency area designed to be calming  [facility] ([place]): [phone]  [facility] ([place]): [phone]  [facility]): [phone]    [facility] Crisis Information:   [place]: [phone]  [place]: [phone]  [place] ([place]) - Adult: [phone]     Child: [phone]  [place] - Adult: [phone]     Child: [phone]  [place]:  [phone]  List of all [facility] resources:   https://[place].gov/dhs/people-we-serve/adults/health-care/mental-health/resources/crisis-contacts.jsp    National Crisis Information:   Crisis Text Line: Text  [place]  to 913348  Suicide & Crisis Lifeline: 988  National Suicide Prevention Lifeline: 2-888-544-TALK (1-571.683.7662)       For online chat options, visit https://suicidepreventionlifeline.org/chat/  Poison Control Center: 5-619-172-9213  Trans Lifeline: 1-000-273-9989 - Hotline for transgender people of all ages  [employer]: 2-587-367-5772 - Hotline for LGBT youth     For Non-Emergency Support:   Fast Tracker: Mental Health & Substance Use Disorder Resources -   https://www.iMusicTweetckWITOIn.org/

## 2023-02-10 NOTE — NURSING NOTE
Is the patient currently in the state of [place]? YES    Visit mode:TELEPHONE    If the visit is dropped, the patient can be reconnected by: TELEPHONE VISIT: Phone number: [phone]    Will anyone else be joining the visit? NO      How would you like to obtain your AVS? MyChart    Are changes needed to the allergy or medication list? NO    Comments or concerns regarding today's visit: None.  Routine f/u
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
## 2023-02-16 ENCOUNTER — TELEPHONE (OUTPATIENT)
Dept: TRANSPLANT | Facility: CLINIC | Age: 63
End: 2023-02-16

## 2023-02-16 ENCOUNTER — TELEPHONE (OUTPATIENT)
Dept: TRANSPLANT | Facility: CLINIC | Age: 63
End: 2023-02-16
Payer: COMMERCIAL

## 2023-02-16 DIAGNOSIS — Z94.0 KIDNEY TRANSPLANTED: Primary | ICD-10-CM

## 2023-02-16 RX ORDER — MYCOPHENOLATE MOFETIL 250 MG/1
750 CAPSULE ORAL 2 TIMES DAILY
Qty: 540 CAPSULE | Refills: 0 | Status: SHIPPED | OUTPATIENT
Start: 2023-02-16 | End: 2023-06-30

## 2023-02-16 NOTE — TELEPHONE ENCOUNTER
Jerad wanted to know how often he is to go for lab draws.  He is currently going for lab draws every 6 months.

## 2023-02-16 NOTE — TELEPHONE ENCOUNTER
Called Jerad. Stated I did refill his medication but Please make an appointment with his nephrologist for his due annual.   He said he will call to schedule an appointment with Dr. Lovett.

## 2023-03-15 ENCOUNTER — TRANSFERRED RECORDS (OUTPATIENT)
Dept: HEALTH INFORMATION MANAGEMENT | Facility: CLINIC | Age: 63
End: 2023-03-15

## 2023-03-30 ENCOUNTER — OFFICE VISIT (OUTPATIENT)
Dept: DERMATOLOGY | Facility: CLINIC | Age: 63
End: 2023-03-30
Payer: COMMERCIAL

## 2023-03-30 DIAGNOSIS — E78.00 PURE HYPERCHOLESTEROLEMIA: ICD-10-CM

## 2023-03-30 DIAGNOSIS — L82.1 SEBORRHEIC KERATOSES: Primary | ICD-10-CM

## 2023-03-30 DIAGNOSIS — L30.4 INTERTRIGO: ICD-10-CM

## 2023-03-30 DIAGNOSIS — L82.0 INFLAMED SEBORRHEIC KERATOSIS: ICD-10-CM

## 2023-03-30 DIAGNOSIS — I25.10 CORONARY ARTERY DISEASE INVOLVING NATIVE CORONARY ARTERY OF NATIVE HEART WITHOUT ANGINA PECTORIS: ICD-10-CM

## 2023-03-30 DIAGNOSIS — Z95.1 S/P CABG (CORONARY ARTERY BYPASS GRAFT): ICD-10-CM

## 2023-03-30 DIAGNOSIS — L60.2 NAIL THICKENING: ICD-10-CM

## 2023-03-30 PROCEDURE — 99214 OFFICE O/P EST MOD 30 MIN: CPT | Performed by: STUDENT IN AN ORGANIZED HEALTH CARE EDUCATION/TRAINING PROGRAM

## 2023-03-30 RX ORDER — TACROLIMUS 1 MG/G
OINTMENT TOPICAL 2 TIMES DAILY
Qty: 60 G | Refills: 3 | Status: SHIPPED | OUTPATIENT
Start: 2023-03-30 | End: 2023-06-12

## 2023-03-30 NOTE — LETTER
3/30/2023       RE: Jerad Ross  [address] [place] [address]     Dear Colleague,    Thank you for referring your patient, Jerad Ross, to the [facility] DERMATOLOGY [facility]IC [place] at [facility]. Please see a copy of my visit note below.    [facility] Dermatology Note    Encounter Date: Mar 30, 2023    Dermatology Problem List:  1.History of kidney transplant 10/6/93  - Immunosuppression: prednisone 5mg, mycophenolate 750 mg BID (previously on cyclosporine, imuran)  2. History of BCC  - Right axilla s/p MMS 4/9/12  3. History of SCC  - Central chest s/p excision 2009  - Unspecified location s/p excision 1999  3. Seborrheic and verrucous keratoses   - Monitor  4. Filiform warts   - left lateral eyelid, left alteral canthus, left temple s/p cryo 3/15/17, 7/19/22  5. Acrochordons   - L anterior neck s/p cryo 10/8/19  6. L thigh epidermal inclusion cyst   - Monitor  7.Prurigo nodularis, left forearm  - s/p cryotherapy 11/10/20    Major PMHx  -   ______________________________________    Impression/Plan:  Jerad was seen today for skin check.    Diagnoses and all orders for this visit:    Seborrheic keratoses  Inflamed seborrheic keratosis  - benign    Intertrigo  -     tacrolimus (PROTOPIC) 0.1 % external ointment; Apply topically 2 times daily  - L inframammary area  - responds to hydrocortisone  - start protopic and use drying powders when it is getting worse during warm months    Nail thickening  -     Orthopedic  Referral; Future  - has difficulty clipping nails, requests referral to podiatry           Follow-up in 1 year .       Staff Involved:  Staff Only    Efren Santos MD   of Dermatology  Department of Dermatology  [facility]      CC:   Chief Complaint   Patient presents with     Skin Check     FBSE       History of Present Illness:  Mr. Jerad Ross is a 63  year old male who presents as a return patient.    Has been doing well has some spots on his back that he would like examined.  Additionally has an itchy rash that comes and goes underneath his pectoral area.  Its red shiny itchy exacerbated by heat and sweat.  He uses hydrocortisone which helps    Labs:      Physical exam:  Vitals: There were no vitals taken for this visit.  GEN: well developed, well-nourished, in no acute distress, in a pleasant mood.     SKIN: Decker phototype 1  - Full skin, which includes the head/face, both arms, chest, back, abdomen,both legs, genitalia and/or groin buttocks, digits and/or nails, was examined.  - Stuck on brown papules on trunk and extremities   - thickened toe nails  - shiny patch of erythema inframammary L pectoral area  - No other lesions of concern on areas examined.     Past Medical History:   Past Medical History:   Diagnosis Date     Acute midline low back pain without sciatica      AION (acute ischemic optic neuropathy)      AION (acute ischemic optic neuropathy)      Anemia in chronic renal disease      Anemia in chronic renal disease      Avascular necrosis of bones of both hips (H)     s/p bilateral hip replacements     Avascular necrosis of bones of both hips (H)     s/p bilateral hip replacements     Basal cell carcinoma      Basal cell carcinoma      Chronic hepatitis B (H)      Chronic hepatitis B (H)      Clostridium difficile colitis      Clostridium difficile colitis      Coronary artery disease involving native coronary artery of native heart without angina pectoris 6/17/2014    Coronary angiogram 6/17/14: Severe distal 3-vessel disease involving small vessels, not amenable to PCI or CABG.     Coronary artery disease involving native coronary artery of native heart without angina pectoris 06/17/2014    Coronary angiogram 6/17/14: Severe distal 3-vessel disease involving small vessels, not amenable to PCI or CABG     CRP elevated      Depression       Depression      Diverticulosis      Diverticulosis      Dyslipidemia      Dyslipidemia      Elevated C-reactive protein (CRP)      FSGS (focal segmental glomerulosclerosis)      FSGS (focal segmental glomerulosclerosis)      Gastric ulcer with hemorrhage 2/12/12     Gastric ulcer with hemorrhage 02/12/2012     GERD (gastroesophageal reflux disease)      GERD (gastroesophageal reflux disease)      Glaucoma     OHTN     Glaucoma      Hemorrhoids      Hemorrhoids      HTN (hypertension)      Hyperlipidemia      Hypertension secondary to other renal disorders      Hypogonadism in male      Hypogonadism in male      Immunosuppressed status (H)      Kidney replaced by transplant     focal glomerulosclerosis      Kidney transplanted     focal glomerulosclerosis      NSTEMI (non-ST elevated myocardial infarction) (H) 6/17/2014     NSTEMI (non-ST elevated myocardial infarction) (H) 06/17/2014     JULIANE (obstructive sleep apnea)     Doesn't use CPAP     JULIANE (obstructive sleep apnea)      Paracentral scotoma     LE     Paracentral scotoma     LE     Secondary renal hyperparathyroidism (H)      Secondary renal hyperparathyroidism (H)      Septic bursitis      Squamous cell carcinoma      Squamous cell carcinoma      Past Surgical History:   Procedure Laterality Date     APPENDECTOMY       APPENDECTOMY       Cardiac Bypass surgery  06/08/2020    [facility]      CATARACT EXTRACTION EXTRACAPSULAR W/ INTRAOCULAR LENS IMPLANTATION Bilateral 4-20-10, 6-1-10     CATARACT IOL, RT/LT  4/19/2000    RE     CATARACT IOL, RT/LT  6/1/2000    LE     COLECTOMY PARTIAL  1983     10 cm, diverticulitis      COLECTOMY SUBTOTAL  1983    10 cm, diverticulitis     COLONOSCOPY  2/13/2012    Procedure:COLONOSCOPY; Surgeon:SLOAN GALLARDO; Location:[facility]     COLONOSCOPY N/A 1/22/2020    Procedure: Colonoscopy, With Polypectomy And Biopsy;  Surgeon: Aston Kiran MD;  Location: [facility] GI     COLONOSCOPY  02/13/2012     CV CORONARY ANGIOGRAM  N/A 6/2/2020    Procedure: Coronary Angiogram;  Surgeon: Alona Florentino MD;  Location: [facility] Cath Lab;  Service: Cardiology     CV CORONARY ANGIOGRAM N/A 9/20/2021    Procedure: Coronary Angiogram;  Surgeon: Adam Agudelo MD;  Location: [facility] CV     CV LEFT HEART CATHETERIZATION WITHOUT LEFT VENTRICULOGRAM Left 6/2/2020    Procedure: Left Heart Catheterization Without Left Ventriculogram;  Surgeon: Alona Florentino MD;  Location: [facility] Cath Lab;  Service: Cardiology     CV SUPRAVALVULAR AORTOGRAM N/A 9/20/2021    Procedure: Supravalvular Aortagram;  Surgeon: Adam Agudelo MD;  Location: [facility] CV     ESOPHAGOSCOPY, GASTROSCOPY, DUODENOSCOPY (EGD), COMBINED  2/13/2012    Procedure:COMBINED ESOPHAGOSCOPY, GASTROSCOPY, DUODENOSCOPY (EGD); Surgeon:SLOAN GALLARDO; Location: GI     ESOPHAGOSCOPY, GASTROSCOPY, DUODENOSCOPY (EGD), COMBINED  02/13/2012     EXTRACAPSULAR CATARACT EXTRATION WITH INTRAOCULAR LENS IMPLANT  4-20-10, 6-1-10    Rt, Lt     HERNIA REPAIR  1995    Lt inguinal     HERNIA REPAIR  1995    Lt inguinal     HIP SURGERY      1981, bilat MITZI, revised 2001, 2005     HIP SURGERY  1981    bilat MITZI, revised 2001, 2005     KIDNEY SURGERY  1978 and 1993    transplant     KIDNEY SURGERY  1978, 1993    transplant     KNEE SURGERY  1983, 1987    bilat TKA     KNEE SURGERY Bilateral 1983, 1987     MOHS MICROGRAPHIC PROCEDURE       MOHS MICROGRAPHIC PROCEDURE       OTHER SURGICAL HISTORY      kidney [id number], 1993     PHACOEMULSIFICATION CLEAR CORNEA WITH STANDARD INTRAOCULAR LENS IMPLANT Right 04/19/2000     PHACOEMULSIFICATION CLEAR CORNEA WITH STANDARD INTRAOCULAR LENS IMPLANT Left 06/01/2000     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, auxiliary spleen     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 His smoking use included cigarettes. He  has a 9.00 pack-year smoking history. He has quit using smokeless tobacco. He reports current alcohol use. He reports that he does not use drugs.    Family History:  Family History   Problem Relation Age of Onset     Cardiovascular Father         AI with valve repair     Hypertension Father      Kidney Disease Father      Other - See Comments Father         AI with valve repair     Cerebrovascular Disease Maternal Grandfather      Other - See Comments Maternal Grandfather         cerebrovascular disease     Cancer Paternal Grandmother         ovarian ca     Ovarian Cancer Paternal Grandmother      Cerebrovascular Disease Paternal Grandfather      Kidney Disease Paternal Aunt      Kidney Disease Paternal Aunt      Skin Cancer No family hx of      Glaucoma No family hx of      Macular Degeneration No family hx of      Amblyopia No family hx of      Melanoma No family hx of      Diabetes No family hx of        Medications:  Current Outpatient Medications   Medication Sig Dispense Refill     tacrolimus (PROTOPIC) 0.1 % external ointment Apply topically 2 times daily 60 g 3     acetaminophen (TYLENOL) 325 MG tablet Take 1-2 tablets by mouth every 6 hours as needed.       albuterol (PROAIR HFA) 108 (90 Base) MCG/ACT inhaler Inhale 2 puffs into the lungs every 6 hours as needed for shortness of breath / dyspnea or wheezing 1 g 11     aspirin 81 MG tablet Take 81 mg by mouth daily        budesonide (PULMICORT FLEXHALER) 90 MCG/ACT inhaler Inhale 2 puffs into the lungs 2 times daily 1 each 8     calcium citrate-vitamin D (CITRACAL) 315-200 MG-UNIT TABS Take 1 tablet by mouth daily.       entecavir (BARACLUDE) 0.5 MG tablet Take 1 tablet (0.5 mg) by mouth daily APPT NEEDED FOR FURTHER REFILLS 90 tablet 3     FLUoxetine (PROZAC) 40 MG capsule Take 1 capsule (40 mg) by mouth daily 90 capsule 2     fluticasone-salmeterol (ADVAIR) 250-50 MCG/ACT inhaler Inhale 1 puff into the lungs every 12 hours 180 each 3     furosemide (LASIX)  20 MG tablet Take 1 tablet (20 mg) by mouth daily as needed (fluid retention) 90 tablet 1     isosorbide mononitrate (IMDUR) 30 MG 24 hr tablet TAKE 1 TABLET(30 MG) BY MOUTH DAILY 90 tablet 0     latanoprost (XALATAN) 0.005 % ophthalmic solution Place 1 drop into both eyes At Bedtime 2.5 mL 11     metoprolol tartrate (LOPRESSOR) 25 MG tablet TAKE 1/2 TABLET(12.5 MG) BY MOUTH TWICE DAILY 90 tablet 1     Multiple Vitamins-Iron (ONE DAILY MULTIVITAMIN/IRON) TABS Take 1 tablet by mouth daily       mycophenolate (GENERIC EQUIVALENT) 250 MG capsule Take 3 capsules (750 mg) by mouth 2 times daily 540 capsule 0     nitroGLYcerin (NITROSTAT) 0.4 MG sublingual tablet Place 1 tablet (0.4 mg) under the tongue every 5 minutes as needed for chest pain 20 tablet 3     Omega-3 Fatty Acids (OMEGA 3 PO) Take 1 capsule by mouth daily Dose unknown       pantoprazole (PROTONIX) 40 MG EC tablet Take 1 tablet (40 mg) by mouth daily 90 tablet 3     polyethylene glycol (MIRALAX) 17 g packet Take 17 g by mouth daily as needed        potassium chloride ER (KLOR-CON M) 20 MEQ CR tablet Take 1 tablet (20 mEq) by mouth daily 100 tablet 1     predniSONE (DELTASONE) 5 MG tablet Take 1 tablet (5 mg) by mouth daily 90 tablet 0     rosuvastatin (CRESTOR) 20 MG tablet TAKE 1 TABLET(20 MG) BY MOUTH DAILY 90 tablet 0     Allergies   Allergen Reactions     Penicillins Shortness Of Breath and Hives     Keflex [Cephalexin Hcl] Other (See Comments)     Pt could not recall reaction     Tetracycline Other (See Comments)     Patient could not recall reaction     Sulfa Drugs Rash

## 2023-03-30 NOTE — PROGRESS NOTES
[facility] Dermatology Note    Encounter Date: Mar 30, 2023    Dermatology Problem List:  1.History of kidney transplant 10/6/93  - Immunosuppression: prednisone 5mg, mycophenolate 750 mg BID (previously on cyclosporine, imuran)  2. History of BCC  - Right axilla s/p MMS 4/9/12  3. History of SCC  - Central chest s/p excision 2009  - Unspecified location s/p excision 1999  3. Seborrheic and verrucous keratoses   - Monitor  4. Filiform warts   - left lateral eyelid, left alteral canthus, left temple s/p cryo 3/15/17, 7/19/22  5. Acrochordons   - L anterior neck s/p cryo 10/8/19  6. L thigh epidermal inclusion cyst   - Monitor  7.Prurigo nodularis, left forearm  - s/p cryotherapy 11/10/20    Major PMHx  -   ______________________________________    Impression/Plan:  Jerad was seen today for skin check.    Diagnoses and all orders for this visit:    Seborrheic keratoses  Inflamed seborrheic keratosis  - benign    Intertrigo  -     tacrolimus (PROTOPIC) 0.1 % external ointment; Apply topically 2 times daily  - L inframammary area  - responds to hydrocortisone  - start protopic and use drying powders when it is getting worse during warm months    Nail thickening  -     Orthopedic  Referral; Future  - has difficulty clipping nails, requests referral to podiatry           Follow-up in 1 year .       Staff Involved:  Staff Only    Efren Santos MD   of Dermatology  Department of Dermatology  [facility]      CC:   Chief Complaint   Patient presents with     Skin Check     FBSE       History of Present Illness:  Mr. Jerad Ross is a 63 year old male who presents as a return patient.    Has been doing well has some spots on his back that he would like examined.  Additionally has an itchy rash that comes and goes underneath his pectoral area.  Its red shiny itchy exacerbated by heat and sweat.  He uses hydrocortisone which  helps    Labs:      Physical exam:  Vitals: There were no vitals taken for this visit.  GEN: well developed, well-nourished, in no acute distress, in a pleasant mood.     SKIN: Decker phototype 1  - Full skin, which includes the head/face, both arms, chest, back, abdomen,both legs, genitalia and/or groin buttocks, digits and/or nails, was examined.  - Stuck on brown papules on trunk and extremities   - thickened toe nails  - shiny patch of erythema inframammary L pectoral area  - No other lesions of concern on areas examined.     Past Medical History:   Past Medical History:   Diagnosis Date     Acute midline low back pain without sciatica      AION (acute ischemic optic neuropathy)      AION (acute ischemic optic neuropathy)      Anemia in chronic renal disease      Anemia in chronic renal disease      Avascular necrosis of bones of both hips (H)     s/p bilateral hip replacements     Avascular necrosis of bones of both hips (H)     s/p bilateral hip replacements     Basal cell carcinoma      Basal cell carcinoma      Chronic hepatitis B (H)      Chronic hepatitis B (H)      Clostridium difficile colitis      Clostridium difficile colitis      Coronary artery disease involving native coronary artery of native heart without angina pectoris 6/17/2014    Coronary angiogram 6/17/14: Severe distal 3-vessel disease involving small vessels, not amenable to PCI or CABG.     Coronary artery disease involving native coronary artery of native heart without angina pectoris 06/17/2014    Coronary angiogram 6/17/14: Severe distal 3-vessel disease involving small vessels, not amenable to PCI or CABG     CRP elevated      Depression      Depression      Diverticulosis      Diverticulosis      Dyslipidemia      Dyslipidemia      Elevated C-reactive protein (CRP)      FSGS (focal segmental glomerulosclerosis)      FSGS (focal segmental glomerulosclerosis)      Gastric ulcer with hemorrhage 2/12/12     Gastric ulcer with  hemorrhage 02/12/2012     GERD (gastroesophageal reflux disease)      GERD (gastroesophageal reflux disease)      Glaucoma     OHTN     Glaucoma      Hemorrhoids      Hemorrhoids      HTN (hypertension)      Hyperlipidemia      Hypertension secondary to other renal disorders      Hypogonadism in male      Hypogonadism in male      Immunosuppressed status (H)      Kidney replaced by transplant     focal glomerulosclerosis      Kidney transplanted     focal glomerulosclerosis      NSTEMI (non-ST elevated myocardial infarction) (H) 6/17/2014     NSTEMI (non-ST elevated myocardial infarction) (H) 06/17/2014     JULIANE (obstructive sleep apnea)     Doesn't use CPAP     JULIANE (obstructive sleep apnea)      Paracentral scotoma     LE     Paracentral scotoma     LE     Secondary renal hyperparathyroidism (H)      Secondary renal hyperparathyroidism (H)      Septic bursitis      Squamous cell carcinoma      Squamous cell carcinoma      Past Surgical History:   Procedure Laterality Date     APPENDECTOMY       APPENDECTOMY       Cardiac Bypass surgery  06/08/2020    Campton's      CATARACT EXTRACTION EXTRACAPSULAR W/ INTRAOCULAR LENS IMPLANTATION Bilateral 4-20-10, 6-1-10     CATARACT IOL, RT/LT  4/19/2000    RE     CATARACT IOL, RT/LT  6/1/2000    LE     COLECTOMY PARTIAL  1983     10 cm, diverticulitis      COLECTOMY SUBTOTAL  1983    10 cm, diverticulitis     COLONOSCOPY  2/13/2012    Procedure:COLONOSCOPY; Surgeon:SLOAN GALLARDO; Location: GI     COLONOSCOPY N/A 1/22/2020    Procedure: Colonoscopy, With Polypectomy And Biopsy;  Surgeon: Aston Kiran MD;  Location:  GI     COLONOSCOPY  02/13/2012     CV CORONARY ANGIOGRAM N/A 6/2/2020    Procedure: Coronary Angiogram;  Surgeon: Alona Florentino MD;  Location: [facility] Cath Lab;  Service: Cardiology     CV CORONARY ANGIOGRAM N/A 9/20/2021    Procedure: Coronary Angiogram;  Surgeon: Adam Agudelo MD;  Location: [facility] CATH LAB CV     CV LEFT  HEART CATHETERIZATION WITHOUT LEFT VENTRICULOGRAM Left 6/2/2020    Procedure: Left Heart Catheterization Without Left Ventriculogram;  Surgeon: Alona Florentino MD;  Location: [facility] Cath Lab;  Service: Cardiology     CV SUPRAVALVULAR AORTOGRAM N/A 9/20/2021    Procedure: Supravalvular Aortagram;  Surgeon: Adam Agudelo MD;  Location: [facility] CATH LAB CV     ESOPHAGOSCOPY, GASTROSCOPY, DUODENOSCOPY (EGD), COMBINED  2/13/2012    Procedure:COMBINED ESOPHAGOSCOPY, GASTROSCOPY, DUODENOSCOPY (EGD); Surgeon:SLOAN GALLARDO; Location: GI     ESOPHAGOSCOPY, GASTROSCOPY, DUODENOSCOPY (EGD), COMBINED  02/13/2012     EXTRACAPSULAR CATARACT EXTRATION WITH INTRAOCULAR LENS IMPLANT  4-20-10, 6-1-10    Rt, Lt     HERNIA REPAIR  1995    Lt inguinal     HERNIA REPAIR  1995    Lt inguinal     HIP SURGERY      1981, bilat MITZI, revised 2001, 2005     HIP SURGERY  1981    bilat MITZI, revised 2001, 2005     KIDNEY SURGERY  1978 and 1993    transplant     KIDNEY SURGERY  1978, 1993    transplant     KNEE SURGERY  1983, 1987    bilat TKA     KNEE SURGERY Bilateral 1983, 1987     MOHS MICROGRAPHIC PROCEDURE       MOHS MICROGRAPHIC PROCEDURE       OTHER SURGICAL HISTORY      kidney [id number], 1993     PHACOEMULSIFICATION CLEAR CORNEA WITH STANDARD INTRAOCULAR LENS IMPLANT Right 04/19/2000     PHACOEMULSIFICATION CLEAR CORNEA WITH STANDARD INTRAOCULAR LENS IMPLANT Left 06/01/2000     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, auxiliary spleen     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 His smoking use included cigarettes. He has a 9.00 pack-year smoking history. He has quit using smokeless tobacco. He reports current alcohol use. He reports that he does not use drugs.    Family History:  Family History   Problem Relation Age of Onset     Cardiovascular Father         AI with valve repair      Hypertension Father      Kidney Disease Father      Other - See Comments Father         AI with valve repair     Cerebrovascular Disease Maternal Grandfather      Other - See Comments Maternal Grandfather         cerebrovascular disease     Cancer Paternal Grandmother         ovarian ca     Ovarian Cancer Paternal Grandmother      Cerebrovascular Disease Paternal Grandfather      Kidney Disease Paternal Aunt      Kidney Disease Paternal Aunt      Skin Cancer No family hx of      Glaucoma No family hx of      Macular Degeneration No family hx of      Amblyopia No family hx of      Melanoma No family hx of      Diabetes No family hx of        Medications:  Current Outpatient Medications   Medication Sig Dispense Refill     tacrolimus (PROTOPIC) 0.1 % external ointment Apply topically 2 times daily 60 g 3     acetaminophen (TYLENOL) 325 MG tablet Take 1-2 tablets by mouth every 6 hours as needed.       albuterol (PROAIR HFA) 108 (90 Base) MCG/ACT inhaler Inhale 2 puffs into the lungs every 6 hours as needed for shortness of breath / dyspnea or wheezing 1 g 11     aspirin 81 MG tablet Take 81 mg by mouth daily        budesonide (PULMICORT FLEXHALER) 90 MCG/ACT inhaler Inhale 2 puffs into the lungs 2 times daily 1 each 8     calcium citrate-vitamin D (CITRACAL) 315-200 MG-UNIT TABS Take 1 tablet by mouth daily.       entecavir (BARACLUDE) 0.5 MG tablet Take 1 tablet (0.5 mg) by mouth daily APPT NEEDED FOR FURTHER REFILLS 90 tablet 3     FLUoxetine (PROZAC) 40 MG capsule Take 1 capsule (40 mg) by mouth daily 90 capsule 2     fluticasone-salmeterol (ADVAIR) 250-50 MCG/ACT inhaler Inhale 1 puff into the lungs every 12 hours 180 each 3     furosemide (LASIX) 20 MG tablet Take 1 tablet (20 mg) by mouth daily as needed (fluid retention) 90 tablet 1     isosorbide mononitrate (IMDUR) 30 MG 24 hr tablet TAKE 1 TABLET(30 MG) BY MOUTH DAILY 90 tablet 0     latanoprost (XALATAN) 0.005 % ophthalmic solution Place 1 drop into both  eyes At Bedtime 2.5 mL 11     metoprolol tartrate (LOPRESSOR) 25 MG tablet TAKE 1/2 TABLET(12.5 MG) BY MOUTH TWICE DAILY 90 tablet 1     Multiple Vitamins-Iron (ONE DAILY MULTIVITAMIN/IRON) TABS Take 1 tablet by mouth daily       mycophenolate (GENERIC EQUIVALENT) 250 MG capsule Take 3 capsules (750 mg) by mouth 2 times daily 540 capsule 0     nitroGLYcerin (NITROSTAT) 0.4 MG sublingual tablet Place 1 tablet (0.4 mg) under the tongue every 5 minutes as needed for chest pain 20 tablet 3     Omega-3 Fatty Acids (OMEGA 3 PO) Take 1 capsule by mouth daily Dose unknown       pantoprazole (PROTONIX) 40 MG EC tablet Take 1 tablet (40 mg) by mouth daily 90 tablet 3     polyethylene glycol (MIRALAX) 17 g packet Take 17 g by mouth daily as needed        potassium chloride ER (KLOR-CON M) 20 MEQ CR tablet Take 1 tablet (20 mEq) by mouth daily 100 tablet 1     predniSONE (DELTASONE) 5 MG tablet Take 1 tablet (5 mg) by mouth daily 90 tablet 0     rosuvastatin (CRESTOR) 20 MG tablet TAKE 1 TABLET(20 MG) BY MOUTH DAILY 90 tablet 0     Allergies   Allergen Reactions     Penicillins Shortness Of Breath and Hives     Keflex [Cephalexin Hcl] Other (See Comments)     Pt could not recall reaction     Tetracycline Other (See Comments)     Patient could not recall reaction     Sulfa Drugs Rash

## 2023-03-30 NOTE — NURSING NOTE
Dermatology Rooming Note    Jerad Ross's goals for this visit include:   Chief Complaint   Patient presents with     Skin Check     FBSE     Tha Haney, EMT-B

## 2023-03-31 RX ORDER — ISOSORBIDE MONONITRATE 30 MG/1
TABLET, EXTENDED RELEASE ORAL
Qty: 90 TABLET | Refills: 3 | Status: ON HOLD | OUTPATIENT
Start: 2023-03-31 | End: 2023-06-13

## 2023-03-31 RX ORDER — ROSUVASTATIN CALCIUM 20 MG/1
TABLET, COATED ORAL
Qty: 90 TABLET | Refills: 3 | Status: SHIPPED | OUTPATIENT
Start: 2023-03-31 | End: 2024-01-23

## 2023-04-02 ENCOUNTER — THERAPY VISIT (OUTPATIENT)
Dept: SLEEP MEDICINE | Facility: CLINIC | Age: 63
End: 2023-04-02
Attending: INTERNAL MEDICINE
Payer: COMMERCIAL

## 2023-04-02 DIAGNOSIS — G47.33 OSA (OBSTRUCTIVE SLEEP APNEA): ICD-10-CM

## 2023-04-02 PROCEDURE — 95810 POLYSOM 6/> YRS 4/> PARAM: CPT | Performed by: INTERNAL MEDICINE

## 2023-04-02 ASSESSMENT — SLEEP AND FATIGUE QUESTIONNAIRES
HOW LIKELY ARE YOU TO NOD OFF OR FALL ASLEEP WHILE LYING DOWN TO REST IN THE AFTERNOON WHEN CIRCUMSTANCES PERMIT: MODERATE CHANCE OF DOZING
HOW LIKELY ARE YOU TO NOD OFF OR FALL ASLEEP WHILE WATCHING TV: SLIGHT CHANCE OF DOZING
HOW LIKELY ARE YOU TO NOD OFF OR FALL ASLEEP WHEN YOU ARE A PASSENGER IN A CAR FOR AN HOUR WITHOUT A BREAK: WOULD NEVER DOZE
HOW LIKELY ARE YOU TO NOD OFF OR FALL ASLEEP WHILE SITTING QUIETLY AFTER LUNCH WITHOUT ALCOHOL: WOULD NEVER DOZE
HOW LIKELY ARE YOU TO NOD OFF OR FALL ASLEEP WHILE SITTING AND READING: WOULD NEVER DOZE
HOW LIKELY ARE YOU TO NOD OFF OR FALL ASLEEP IN A CAR, WHILE STOPPED FOR A FEW MINUTES IN TRAFFIC: WOULD NEVER DOZE
HOW LIKELY ARE YOU TO NOD OFF OR FALL ASLEEP WHILE SITTING INACTIVE IN A PUBLIC PLACE: WOULD NEVER DOZE
HOW LIKELY ARE YOU TO NOD OFF OR FALL ASLEEP WHILE SITTING AND TALKING TO SOMEONE: WOULD NEVER DOZE

## 2023-04-03 NOTE — PATIENT INSTRUCTIONS
[facility] [place] [facility]    1. Your sleep study will be reviewed by a sleep physician within the next few days.     2. Please follow up in the sleep clinic as scheduled, or, make an appointment with your sleep provider to be seen within two weeks to discuss the results of the sleep study.    3. If you have any questions or problems with your treatment plan, please contact your sleep clinic provider at [phone] to further manage your condition.    4. Please review your attached medication list, and, at your follow-up appointment advise your sleep clinic provider about any changes.    5. Go to [email or web address] for more information about your sleep problems.    Sophie Rodriguez, RPSGT  April 3, 2023

## 2023-04-04 DIAGNOSIS — Z48.298 AFTERCARE FOLLOWING ORGAN TRANSPLANT: Primary | ICD-10-CM

## 2023-04-04 DIAGNOSIS — Z94.0 KIDNEY TRANSPLANTED: Primary | ICD-10-CM

## 2023-04-04 DIAGNOSIS — Z94.0 KIDNEY TRANSPLANTED: ICD-10-CM

## 2023-04-04 RX ORDER — PREDNISONE 5 MG/1
5 TABLET ORAL DAILY
Qty: 90 TABLET | Refills: 0 | Status: SHIPPED | OUTPATIENT
Start: 2023-04-04 | End: 2023-05-01

## 2023-04-05 ENCOUNTER — TRANSFERRED RECORDS (OUTPATIENT)
Dept: HEALTH INFORMATION MANAGEMENT | Facility: CLINIC | Age: 63
End: 2023-04-05

## 2023-04-06 ENCOUNTER — LAB (OUTPATIENT)
Dept: LAB | Facility: CLINIC | Age: 63
End: 2023-04-06
Attending: INTERNAL MEDICINE
Payer: COMMERCIAL

## 2023-04-06 ENCOUNTER — ANCILLARY PROCEDURE (OUTPATIENT)
Dept: ULTRASOUND IMAGING | Facility: CLINIC | Age: 63
End: 2023-04-06
Attending: INTERNAL MEDICINE
Payer: COMMERCIAL

## 2023-04-06 ENCOUNTER — OFFICE VISIT (OUTPATIENT)
Dept: NEPHROLOGY | Facility: CLINIC | Age: 63
End: 2023-04-06
Attending: INTERNAL MEDICINE
Payer: COMMERCIAL

## 2023-04-06 VITALS
BODY MASS INDEX: 29.07 KG/M2 | HEART RATE: 74 BPM | DIASTOLIC BLOOD PRESSURE: 91 MMHG | SYSTOLIC BLOOD PRESSURE: 138 MMHG | OXYGEN SATURATION: 95 % | TEMPERATURE: 98.1 F | WEIGHT: 191.2 LBS

## 2023-04-06 DIAGNOSIS — Z94.0 KIDNEY TRANSPLANTED: ICD-10-CM

## 2023-04-06 DIAGNOSIS — Z94.0 KIDNEY TRANSPLANTED: Primary | ICD-10-CM

## 2023-04-06 DIAGNOSIS — Z48.298 AFTERCARE FOLLOWING ORGAN TRANSPLANT: ICD-10-CM

## 2023-04-06 DIAGNOSIS — B18.1 CHRONIC VIRAL HEPATITIS B WITHOUT DELTA AGENT AND WITHOUT COMA (H): ICD-10-CM

## 2023-04-06 DIAGNOSIS — Z94.0 KIDNEY REPLACED BY TRANSPLANT: ICD-10-CM

## 2023-04-06 DIAGNOSIS — Z79.899 ENCOUNTER FOR LONG-TERM CURRENT USE OF MEDICATION: ICD-10-CM

## 2023-04-06 DIAGNOSIS — E87.6 HYPOKALEMIA: ICD-10-CM

## 2023-04-06 LAB
ALBUMIN MFR UR ELPH: <6 MG/DL (ref 1–14)
ANION GAP SERPL CALCULATED.3IONS-SCNC: 8 MMOL/L (ref 7–15)
BASOPHILS # BLD AUTO: 0 10E3/UL (ref 0–0.2)
BASOPHILS NFR BLD AUTO: 1 %
BUN SERPL-MCNC: 14.9 MG/DL (ref 8–23)
CALCIUM SERPL-MCNC: 9.9 MG/DL (ref 8.8–10.2)
CHLORIDE SERPL-SCNC: 105 MMOL/L (ref 98–107)
CREAT SERPL-MCNC: 0.77 MG/DL (ref 0.67–1.17)
CREAT UR-MCNC: 45.1 MG/DL
CRP SERPL-MCNC: 10.4 MG/L
DEPRECATED HCO3 PLAS-SCNC: 29 MMOL/L (ref 22–29)
EOSINOPHIL # BLD AUTO: 0.2 10E3/UL (ref 0–0.7)
EOSINOPHIL NFR BLD AUTO: 3 %
ERYTHROCYTE [DISTWIDTH] IN BLOOD BY AUTOMATED COUNT: 16.2 % (ref 10–15)
ERYTHROCYTE [SEDIMENTATION RATE] IN BLOOD BY WESTERGREN METHOD: 17 MM/HR (ref 0–20)
GFR SERPL CREATININE-BSD FRML MDRD: >90 ML/MIN/1.73M2
GLUCOSE SERPL-MCNC: 108 MG/DL (ref 70–99)
HCT VFR BLD AUTO: 42.6 % (ref 40–53)
HGB BLD-MCNC: 13.2 G/DL (ref 13.3–17.7)
IMM GRANULOCYTES # BLD: 0 10E3/UL
IMM GRANULOCYTES NFR BLD: 0 %
LYMPHOCYTES # BLD AUTO: 2.2 10E3/UL (ref 0.8–5.3)
LYMPHOCYTES NFR BLD AUTO: 30 %
MCH RBC QN AUTO: 28 PG (ref 26.5–33)
MCHC RBC AUTO-ENTMCNC: 31 G/DL (ref 31.5–36.5)
MCV RBC AUTO: 90 FL (ref 78–100)
MONOCYTES # BLD AUTO: 0.8 10E3/UL (ref 0–1.3)
MONOCYTES NFR BLD AUTO: 10 %
NEUTROPHILS # BLD AUTO: 4.1 10E3/UL (ref 1.6–8.3)
NEUTROPHILS NFR BLD AUTO: 56 %
NRBC # BLD AUTO: 0 10E3/UL
NRBC BLD AUTO-RTO: 0 /100
PLATELET # BLD AUTO: 281 10E3/UL (ref 150–450)
POTASSIUM SERPL-SCNC: 4.4 MMOL/L (ref 3.4–5.3)
PROT/CREAT 24H UR: NORMAL MG/G{CREAT}
RBC # BLD AUTO: 4.72 10E6/UL (ref 4.4–5.9)
SLPCOMP: NORMAL
SODIUM SERPL-SCNC: 142 MMOL/L (ref 136–145)
WBC # BLD AUTO: 7.3 10E3/UL (ref 4–11)

## 2023-04-06 PROCEDURE — 85652 RBC SED RATE AUTOMATED: CPT | Performed by: PATHOLOGY

## 2023-04-06 PROCEDURE — 86140 C-REACTIVE PROTEIN: CPT | Performed by: PATHOLOGY

## 2023-04-06 PROCEDURE — 99214 OFFICE O/P EST MOD 30 MIN: CPT | Performed by: NURSE PRACTITIONER

## 2023-04-06 PROCEDURE — 36415 COLL VENOUS BLD VENIPUNCTURE: CPT | Performed by: PATHOLOGY

## 2023-04-06 PROCEDURE — 76700 US EXAM ABDOM COMPLETE: CPT | Mod: GC | Performed by: RADIOLOGY

## 2023-04-06 PROCEDURE — 80048 BASIC METABOLIC PNL TOTAL CA: CPT | Performed by: PATHOLOGY

## 2023-04-06 PROCEDURE — 84156 ASSAY OF PROTEIN URINE: CPT | Performed by: PATHOLOGY

## 2023-04-06 PROCEDURE — 85025 COMPLETE CBC W/AUTO DIFF WBC: CPT | Performed by: PATHOLOGY

## 2023-04-06 PROCEDURE — G0463 HOSPITAL OUTPT CLINIC VISIT: HCPCS | Performed by: NURSE PRACTITIONER

## 2023-04-06 ASSESSMENT — PAIN SCALES - GENERAL: PAINLEVEL: MILD PAIN (2)

## 2023-04-06 NOTE — LETTER
4/6/2023       RE: Jerad Ross  [address] [place] [address]     Dear Colleague,    Thank you for referring your patient, Jerad Ross, to the [facility] [place] [facility]. Please see a copy of my visit note below.    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    - Baseline Creatinine:  ~ 0.7-0.9   - Proteinuria: Minimal (0.2-0.5 grams)   - Date DSA Last Checked: Not Known      Latest DSA: Not checked recently due to time from transplant   - BK Viremia: Not checked recently due to time from transplant   - Kidney Tx Biopsy: No    # Immunosuppression: Mycophenolate mofetil (dose 750 mg every 12 hours) and Prednisone (dose 5 mg daily)   - Continue with intensive monitoring of immunosuppression for efficacy and toxicity.   - Changes: No    # Infection Prophylaxis:   Last CD4 Level: Not checked  - PJP: None    # Hypertension: Borderline control;  Goal BP: < 130/80   - Changes: No , not at this time. Continue IMDUR 30 mg daily and metoprolol 12.5 mg BID. /81 mmHg today.  Encouraged he start checking BPs at home more frequently.      BPs: not checking at home.     # Anemia in Chronic Renal Disease: Hgb: Stable, near normal      CAROL ANN: No   - Iron studies: Not checked recently    # Mineral Bone Disorder:   - Vitamin D; level: Normal        On supplement: Yes  - Calcium; level: Normal        On supplement: Yes    # CAD, s/p CABG: Some increased dyspnea on exertion, possibly an angina equivalent.  Patient was evaluated with cardiac stress test, which was abnormal.  He underwent coronary angiogram 9/2021 that showed some possible obstructive disease in the 1st diagonal, but unable to stent it.  Bypass grafts were open.  Plan is for medical management.  Patient is followed closely by Cardiology.    # Chronic Hepatitis B: Stable on entecavir. Follows  here with hepatology.     # Asthma: Some increase in shortness of breath, but felt more cardiac in origin.  Patient is stable on inhalers.    # JULIANE: Patient is unable to use CPAP.   - Recommend follow up with Sleep Clinic.    # GERD: Asymptomatic on pantoprazole, but with h/o ulcer, will continue on medication.    # Skin Cancer: New lesions: none , followed by derm twice yearly.    - Discussed sun protection and recommend regular follow up with Dermatology.    # Medical Compliance: Yes    # COVID-19 Virus Review: Discussed COVID-19 virus and the potential medical risks.  Reviewed preventative health recommendations, including wearing a mask where appropriate.  Recommended COVID vaccination should be up to date with either an initial vaccination or booster shot when appropriate.  Asked the patient to inform the transplant center if they are exposed or diagnosed with this virus.    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  Significant transplant-related complications: None    Transplant Office Phone Number: [phone]    Assessment and plan was discussed with the patient and he voiced his understanding and agreement.    Return visit: No follow-ups on file.    RONALDO Baumann CNP    Chief Complaint   Mr. Ross is a 63 year old here for kidney transplant and immunosuppression management.    History of Present Illness   Since he was last seen by Dr. Lovett in 9/2021, he denies any hospitalizations, ED visits or illnesses.  Complains of chronic fatigue,  recently completed a sleep study that is pending.  Following with Ortho and potentially going to do a third right hip replacement in the near future.  Tylenol is not controlling his pain.   Discussed considering Voltaren gel or steroid injections or possibly tramadol / an opioid through PCP.  Denies fevers, sweats, chills.  Denies nausea, vomiting, diarrhea.  Denies chest pain, shortness of breath.  Appetite is okay and weight is stable.      Home BP: Not checked    Problem List   Patient Active Problem List   Diagnosis    BCC (basal cell carcinoma), trunk    JULIANE (obstructive sleep apnea)    HTN, kidney transplant related    Coronary arteriosclerosis due to lipid rich plaque    NSTEMI (non-ST elevated myocardial infarction) (H)    Depression    Diverticulosis    Hemorrhoids    Kidney replaced by transplant    Basal cell carcinoma    Squamous cell carcinoma    Dyslipidemia    CRP elevated    Glaucoma    AION (acute ischemic optic neuropathy)    Paracentral scotoma    Hip pain    Inflamed seborrheic keratosis    Intertrigo    Chronic hepatitis B (H)    Immunosuppression (H)    Hypogonadism in male    GERD (gastroesophageal reflux disease)    Aftercare following organ transplant    Acute midline low back pain without sciatica    Septic bursitis    Impaired mobility    S/P CABG (coronary artery bypass graft)    Abnormal cardiovascular stress test    HTN (hypertension)    End stage renal disease (H)       Allergies   Allergies   Allergen Reactions    Penicillins Shortness Of Breath and Hives    Keflex [Cephalexin Hcl] Other (See Comments)     Pt could not recall reaction    Tetracycline Other (See Comments)     Patient could not recall reaction    Sulfa Drugs Rash       Medications   Current Outpatient Medications   Medication Sig    acetaminophen (TYLENOL) 325 MG tablet Take 1-2 tablets by mouth every 6 hours as needed.    albuterol (PROAIR HFA) 108 (90 Base) MCG/ACT inhaler Inhale 2 puffs into the lungs every 6 hours as needed for shortness of breath / dyspnea or wheezing    aspirin 81 MG tablet Take 81 mg by mouth daily     budesonide (PULMICORT FLEXHALER) 90 MCG/ACT inhaler Inhale 2 puffs into the lungs  2 times daily    calcium citrate-vitamin D (CITRACAL) 315-200 MG-UNIT TABS Take 1 tablet by mouth daily.    entecavir (BARACLUDE) 0.5 MG tablet Take 1 tablet (0.5 mg) by mouth daily APPT NEEDED FOR FURTHER REFILLS    FLUoxetine (PROZAC) 40 MG capsule Take 1 capsule (40 mg) by mouth daily    fluticasone-salmeterol (ADVAIR) 250-50 MCG/ACT inhaler Inhale 1 puff into the lungs every 12 hours    furosemide (LASIX) 20 MG tablet Take 1 tablet (20 mg) by mouth daily as needed (fluid retention)    isosorbide mononitrate (IMDUR) 30 MG 24 hr tablet TAKE 1 TABLET(30 MG) BY MOUTH DAILY    latanoprost (XALATAN) 0.005 % ophthalmic solution Place 1 drop into both eyes At Bedtime    metoprolol tartrate (LOPRESSOR) 25 MG tablet TAKE 1/2 TABLET(12.5 MG) BY MOUTH TWICE DAILY    Multiple Vitamins-Iron (ONE DAILY MULTIVITAMIN/IRON) TABS Take 1 tablet by mouth daily    mycophenolate (GENERIC EQUIVALENT) 250 MG capsule Take 3 capsules (750 mg) by mouth 2 times daily    nitroGLYcerin (NITROSTAT) 0.4 MG sublingual tablet Place 1 tablet (0.4 mg) under the tongue every 5 minutes as needed for chest pain    Omega-3 Fatty Acids (OMEGA 3 PO) Take 1 capsule by mouth daily Dose unknown    pantoprazole (PROTONIX) 40 MG EC tablet Take 1 tablet (40 mg) by mouth daily    polyethylene glycol (MIRALAX) 17 g packet Take 17 g by mouth daily as needed     potassium chloride ER (KLOR-CON M) 20 MEQ CR tablet Take 1 tablet (20 mEq) by mouth daily    predniSONE (DELTASONE) 5 MG tablet Take 1 tablet (5 mg) by mouth daily    rosuvastatin (CRESTOR) 20 MG tablet TAKE 1 TABLET(20 MG) BY MOUTH DAILY    tacrolimus (PROTOPIC) 0.1 % external ointment Apply topically 2 times daily     No current facility-administered medications for this visit.     There are no discontinued medications.    Physical Exam   Vital Signs: There were no vitals taken for this visit.    GENERAL APPEARANCE: alert and no distress  HENT: mouth without ulcers or lesions  LYMPHATICS: no cervical or  supraclavicular nodes  RESP: lungs clear to auscultation - no rales, rhonchi or wheezes  CV: regular rhythm, normal rate, no rub, no murmur  EDEMA: no LE edema bilaterally  ABDOMEN: soft, nondistended, nontender, bowel sounds normal  MS: extremities normal - no gross deformities noted, no evidence of inflammation in joints, no muscle tenderness  SKIN: no rash, actinic keratoses  TX KIDNEY: normal  DIALYSIS ACCESS: none    Data         Latest Ref Rng & Units 10/21/2022     9:08 AM 7/19/2022    10:36 AM 11/10/2021     9:51 AM   Renal   Sodium 136 - 145 mmol/L 141   141   143     K 3.4 - 5.3 mmol/L 4.0   3.2   4.0     Cl 98 - 107 mmol/L 102   105   104     Cl (external) 98 - 107 mmol/L 102   105   104     CO2 22 - 29 mmol/L 29   27   28     Urea Nitrogen 7 - 30 mg/dL  15   11     Urea Nitrogen 8.0 - 23.0 mg/dL 14.1       Creatinine 0.67 - 1.17 mg/dL 0.83   0.89   0.73     Glucose 70 - 99 mg/dL 87   108   69     Calcium 8.8 - 10.2 mg/dL 10.1   9.8   9.5           Latest Ref Rng & Units 3/9/2021    12:11 PM 10/28/2020     8:57 AM 11/5/2018     7:39 AM   Bone Health   Phosphorus 2.5 - 4.5 mg/dL   2.9     Vit D Def 20 - 75 ug/L 49   36            Latest Ref Rng & Units 10/21/2022     9:08 AM 7/19/2022    10:36 AM 9/20/2021     7:39 AM   Heme   WBC 4.0 - 11.0 10e3/uL 7.3   8.7   7.6     Hgb 13.3 - 17.7 g/dL 14.3   14.4   13.1     Plt 150 - 450 10e3/uL 315   281   279           Latest Ref Rng & Units 7/19/2022    10:36 AM 8/2/2021     1:01 PM 4/12/2021    10:01 AM   Liver   AP 40 - 150 U/L 74   69   109     TBili 0.2 - 1.3 mg/dL 1.3   1.0   0.5     Bilirubin Direct 0.0 - 0.2 mg/dL 0.4    0.2     ALT 0 - 70 U/L 22   27   30     AST 0 - 45 U/L 27   25   20     Tot Protein 6.8 - 8.8 g/dL 7.4   7.2   6.6     Albumin 3.4 - 5.0 g/dL 3.5   3.2   3.1           Latest Ref Rng & Units 10/21/2022     9:08 AM 10/28/2020     8:57 AM 6/3/2020     5:47 AM   Pancreas   A1C <5.7 % 6.0   5.8   5.6           Latest Ref Rng & Units 1/8/2009      9:33 AM   Iron studies   Ferritin 20 - 300 ng/mL 76           Latest Ref Rng & Units 9/2/2021     2:54 PM 11/6/2018     6:47 AM 9/12/2017     9:23 AM   [facility] Virology   CVM DNA Quant    Plasma, EDTA anticoagulant     CMV QUANT IU/ML Not Detected IU/mL Not Detected    CMV DNA Not Detected     LOG IU/ML OF CMVQNT <2.1 [Log_IU]/mL   Not Calculated     EBV DNA COPIES/ML EBVNEG^EBV DNA Not Detected [Copies]/mL  EBV DNA Not Detected      EBV DNA LOG OF COPIES <2.7 [Log_copies]/mL  Not Calculated               Recent Labs   Lab Test 09/12/17  0923 11/06/18  0647   DOSMPA 9pm 09.11.2017 Not Provided   MPACID 3.82* 0.55*   MPAG 69.3 29.5*       Again, thank you for allowing me to participate in the care of your patient.      Sincerely,    RONALDO Baumann CNP

## 2023-04-06 NOTE — NURSING NOTE
Chief Complaint   Patient presents with     RECHECK     Return visit.     Blood pressure (!) 138/91, pulse 74, temperature 98.1  F (36.7  C), weight 86.7 kg (191 lb 3.2 oz), SpO2 95 %.    LILY LOPEZ

## 2023-04-06 NOTE — PROGRESS NOTES
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    - Baseline Creatinine:  ~ 0.7-0.9   - Proteinuria: Minimal (0.2-0.5 grams)   - Date DSA Last Checked: Not Known      Latest DSA: Not checked recently due to time from transplant   - BK Viremia: Not checked recently due to time from transplant   - Kidney Tx Biopsy: No    # Immunosuppression: Mycophenolate mofetil (dose 750 mg every 12 hours) and Prednisone (dose 5 mg daily)   - Continue with intensive monitoring of immunosuppression for efficacy and toxicity.   - Changes: No    # Infection Prophylaxis:   Last CD4 Level: Not checked  - PJP: None    # Hypertension: Borderline control;  Goal BP: < 130/80   - Changes: No , not at this time. Continue IMDUR 30 mg daily and metoprolol 12.5 mg BID. /81 mmHg today.  Encouraged he start checking BPs at home more frequently.      BPs: not checking at home.     # Anemia in Chronic Renal Disease: Hgb: Stable, near normal      CAROL ANN: No   - Iron studies: Not checked recently    # Mineral Bone Disorder:   - Vitamin D; level: Normal        On supplement: Yes  - Calcium; level: Normal        On supplement: Yes    # CAD, s/p CABG: Some increased dyspnea on exertion, possibly an angina equivalent.  Patient was evaluated with cardiac stress test, which was abnormal.  He underwent coronary angiogram 9/2021 that showed some possible obstructive disease in the 1st diagonal, but unable to stent it.  Bypass grafts were open.  Plan is for medical management.  Patient is followed closely by Cardiology.    # Chronic Hepatitis B: Stable on entecavir. Follows here with hepatology.     # Asthma: Some increase in shortness of breath, but felt more cardiac in origin.  Patient is stable on inhalers.    # JULIANE: Patient is unable to use CPAP.   - Recommend follow up with Sleep Clinic.    # GERD: Asymptomatic on pantoprazole, but with h/o ulcer, will continue on  medication.    # Skin Cancer: New lesions: none , followed by derm twice yearly.    - Discussed sun protection and recommend regular follow up with Dermatology.    # Medical Compliance: Yes    # COVID-19 Virus Review: Discussed COVID-19 virus and the potential medical risks.  Reviewed preventative health recommendations, including wearing a mask where appropriate.  Recommended COVID vaccination should be up to date with either an initial vaccination or booster shot when appropriate.  Asked the patient to inform the transplant center if they are exposed or diagnosed with this virus.    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  Significant transplant-related complications: None    Transplant Office Phone Number: [phone]    Assessment and plan was discussed with the patient and he voiced his understanding and agreement.    Return visit: No follow-ups on file.    RONALDO Baumann CNP    Chief Complaint   Mr. Ross is a 63 year old here for kidney transplant and immunosuppression management.    History of Present Illness   Since he was last seen by Dr. Lovett in 9/2021, he denies any hospitalizations, ED visits or illnesses.  Complains of chronic fatigue,  recently completed a sleep study that is pending.  Following with Ortho and potentially going to do a third right hip replacement in the near future.  Tylenol is not controlling his pain.  Discussed considering Voltaren gel or steroid injections or possibly tramadol / an opioid through PCP.  Denies fevers, sweats, chills.  Denies nausea, vomiting, diarrhea.  Denies chest pain, shortness of breath.  Appetite is okay and weight is stable.      Home BP: Not checked    Problem List   Patient  Active Problem List   Diagnosis     BCC (basal cell carcinoma), trunk     JULIANE (obstructive sleep apnea)     HTN, kidney transplant related     Coronary arteriosclerosis due to lipid rich plaque     NSTEMI (non-ST elevated myocardial infarction) (H)     Depression     Diverticulosis     Hemorrhoids     Kidney replaced by transplant     Basal cell carcinoma     Squamous cell carcinoma     Dyslipidemia     CRP elevated     Glaucoma     AION (acute ischemic optic neuropathy)     Paracentral scotoma     Hip pain     Inflamed seborrheic keratosis     Intertrigo     Chronic hepatitis B (H)     Immunosuppression (H)     Hypogonadism in male     GERD (gastroesophageal reflux disease)     Aftercare following organ transplant     Acute midline low back pain without sciatica     Septic bursitis     Impaired mobility     S/P CABG (coronary artery bypass graft)     Abnormal cardiovascular stress test     HTN (hypertension)     End stage renal disease (H)       Allergies   Allergies   Allergen Reactions     Penicillins Shortness Of Breath and Hives     Keflex [Cephalexin Hcl] Other (See Comments)     Pt could not recall reaction     Tetracycline Other (See Comments)     Patient could not recall reaction     Sulfa Drugs Rash       Medications   Current Outpatient Medications   Medication Sig     acetaminophen (TYLENOL) 325 MG tablet Take 1-2 tablets by mouth every 6 hours as needed.     albuterol (PROAIR HFA) 108 (90 Base) MCG/ACT inhaler Inhale 2 puffs into the lungs every 6 hours as needed for shortness of breath / dyspnea or wheezing     aspirin 81 MG tablet Take 81 mg by mouth daily      budesonide (PULMICORT FLEXHALER) 90 MCG/ACT inhaler Inhale 2 puffs into the lungs 2 times daily     calcium citrate-vitamin D (CITRACAL) 315-200 MG-UNIT TABS Take 1 tablet by mouth daily.     entecavir (BARACLUDE) 0.5 MG tablet Take 1 tablet (0.5 mg) by mouth daily APPT NEEDED FOR FURTHER REFILLS     FLUoxetine (PROZAC) 40 MG capsule Take 1  capsule (40 mg) by mouth daily     fluticasone-salmeterol (ADVAIR) 250-50 MCG/ACT inhaler Inhale 1 puff into the lungs every 12 hours     furosemide (LASIX) 20 MG tablet Take 1 tablet (20 mg) by mouth daily as needed (fluid retention)     isosorbide mononitrate (IMDUR) 30 MG 24 hr tablet TAKE 1 TABLET(30 MG) BY MOUTH DAILY     latanoprost (XALATAN) 0.005 % ophthalmic solution Place 1 drop into both eyes At Bedtime     metoprolol tartrate (LOPRESSOR) 25 MG tablet TAKE 1/2 TABLET(12.5 MG) BY MOUTH TWICE DAILY     Multiple Vitamins-Iron (ONE DAILY MULTIVITAMIN/IRON) TABS Take 1 tablet by mouth daily     mycophenolate (GENERIC EQUIVALENT) 250 MG capsule Take 3 capsules (750 mg) by mouth 2 times daily     nitroGLYcerin (NITROSTAT) 0.4 MG sublingual tablet Place 1 tablet (0.4 mg) under the tongue every 5 minutes as needed for chest pain     Omega-3 Fatty Acids (OMEGA 3 PO) Take 1 capsule by mouth daily Dose unknown     pantoprazole (PROTONIX) 40 MG EC tablet Take 1 tablet (40 mg) by mouth daily     polyethylene glycol (MIRALAX) 17 g packet Take 17 g by mouth daily as needed      potassium chloride ER (KLOR-CON M) 20 MEQ CR tablet Take 1 tablet (20 mEq) by mouth daily     predniSONE (DELTASONE) 5 MG tablet Take 1 tablet (5 mg) by mouth daily     rosuvastatin (CRESTOR) 20 MG tablet TAKE 1 TABLET(20 MG) BY MOUTH DAILY     tacrolimus (PROTOPIC) 0.1 % external ointment Apply topically 2 times daily     No current facility-administered medications for this visit.     There are no discontinued medications.    Physical Exam   Vital Signs: There were no vitals taken for this visit.    GENERAL APPEARANCE: alert and no distress  HENT: mouth without ulcers or lesions  LYMPHATICS: no cervical or supraclavicular nodes  RESP: lungs clear to auscultation - no rales, rhonchi or wheezes  CV: regular rhythm, normal rate, no rub, no murmur  EDEMA: no LE edema bilaterally  ABDOMEN: soft, nondistended, nontender, bowel sounds normal  MS:  extremities normal - no gross deformities noted, no evidence of inflammation in joints, no muscle tenderness  SKIN: no rash, actinic keratoses  TX KIDNEY: normal  DIALYSIS ACCESS: none    Data         Latest Ref Rng & Units 10/21/2022     9:08 AM 7/19/2022    10:36 AM 11/10/2021     9:51 AM   Renal   Sodium 136 - 145 mmol/L 141   141   143     K 3.4 - 5.3 mmol/L 4.0   3.2   4.0     Cl 98 - 107 mmol/L 102   105   104     Cl (external) 98 - 107 mmol/L 102   105   104     CO2 22 - 29 mmol/L 29   27   28     Urea Nitrogen 7 - 30 mg/dL  15   11     Urea Nitrogen 8.0 - 23.0 mg/dL 14.1       Creatinine 0.67 - 1.17 mg/dL 0.83   0.89   0.73     Glucose 70 - 99 mg/dL 87   108   69     Calcium 8.8 - 10.2 mg/dL 10.1   9.8   9.5           Latest Ref Rng & Units 3/9/2021    12:11 PM 10/28/2020     8:57 AM 11/5/2018     7:39 AM   Bone Health   Phosphorus 2.5 - 4.5 mg/dL   2.9     Vit D Def 20 - 75 ug/L 49   36            Latest Ref Rng & Units 10/21/2022     9:08 AM 7/19/2022    10:36 AM 9/20/2021     7:39 AM   Heme   WBC 4.0 - 11.0 10e3/uL 7.3   8.7   7.6     Hgb 13.3 - 17.7 g/dL 14.3   14.4   13.1     Plt 150 - 450 10e3/uL 315   281   279           Latest Ref Rng & Units 7/19/2022    10:36 AM 8/2/2021     1:01 PM 4/12/2021    10:01 AM   Liver   AP 40 - 150 U/L 74   69   109     TBili 0.2 - 1.3 mg/dL 1.3   1.0   0.5     Bilirubin Direct 0.0 - 0.2 mg/dL 0.4    0.2     ALT 0 - 70 U/L 22   27   30     AST 0 - 45 U/L 27   25   20     Tot Protein 6.8 - 8.8 g/dL 7.4   7.2   6.6     Albumin 3.4 - 5.0 g/dL 3.5   3.2   3.1           Latest Ref Rng & Units 10/21/2022     9:08 AM 10/28/2020     8:57 AM 6/3/2020     5:47 AM   Pancreas   A1C <5.7 % 6.0   5.8   5.6           Latest Ref Rng & Units 1/8/2009     9:33 AM   Iron studies   Ferritin 20 - 300 ng/mL 76           Latest Ref Rng & Units 9/2/2021     2:54 PM 11/6/2018     6:47 AM 9/12/2017     9:23 AM   [facility] Vi[facility]   CVM DNA Quant    Plasma, EDTA anticoagulant     CMV QUANT IU/ML  Not Detected IU/mL Not Detected    CMV DNA Not Detected     LOG IU/ML OF CMVQNT <2.1 [Log_IU]/mL   Not Calculated     EBV DNA COPIES/ML EBVNEG^EBV DNA Not Detected [Copies]/mL  EBV DNA Not Detected      EBV DNA LOG OF COPIES <2.7 [Log_copies]/mL  Not Calculated               Recent Labs   Lab Test 09/12/17  0923 11/06/18  0647   DOSMPA 9pm 09.11.2017 Not Provided   MPACID 3.82* 0.55*   MPAG 69.3 29.5*

## 2023-04-07 ENCOUNTER — MYC MEDICAL ADVICE (OUTPATIENT)
Dept: INTERNAL MEDICINE | Facility: CLINIC | Age: 63
End: 2023-04-07

## 2023-04-07 DIAGNOSIS — B18.1 CHRONIC VIRAL HEPATITIS B WITHOUT DELTA AGENT AND WITHOUT COMA (H): Primary | ICD-10-CM

## 2023-04-08 ENCOUNTER — HOSPITAL ENCOUNTER (OUTPATIENT)
Facility: CLINIC | Age: 63
Setting detail: OBSERVATION
Discharge: NURSING, REHAB OR LONG-TERM CARE | End: 2023-04-10
Attending: EMERGENCY MEDICINE | Admitting: PHYSICIAN ASSISTANT
Payer: COMMERCIAL

## 2023-04-08 ENCOUNTER — APPOINTMENT (OUTPATIENT)
Dept: GENERAL RADIOLOGY | Facility: CLINIC | Age: 63
End: 2023-04-08
Attending: EMERGENCY MEDICINE
Payer: COMMERCIAL

## 2023-04-08 DIAGNOSIS — J45.909 ASTHMATIC BRONCHITIS WITHOUT COMPLICATION, UNSPECIFIED ASTHMA SEVERITY, UNSPECIFIED WHETHER PERSISTENT: ICD-10-CM

## 2023-04-08 DIAGNOSIS — K21.9 GASTROESOPHAGEAL REFLUX DISEASE WITHOUT ESOPHAGITIS: ICD-10-CM

## 2023-04-08 DIAGNOSIS — I25.10 ATHEROSCLEROSIS OF NATIVE CORONARY ARTERY WITHOUT ANGINA PECTORIS, UNSPECIFIED WHETHER NATIVE OR TRANSPLANTED HEART: ICD-10-CM

## 2023-04-08 DIAGNOSIS — Z94.0 KIDNEY REPLACED BY TRANSPLANT: ICD-10-CM

## 2023-04-08 DIAGNOSIS — B18.1 CHRONIC HEPATITIS B (H): ICD-10-CM

## 2023-04-08 DIAGNOSIS — M25.551 RIGHT HIP PAIN: ICD-10-CM

## 2023-04-08 DIAGNOSIS — B18.1 HEPATITIS B CARRIER (H): ICD-10-CM

## 2023-04-08 DIAGNOSIS — I10 ESSENTIAL HYPERTENSION, MALIGNANT: ICD-10-CM

## 2023-04-08 DIAGNOSIS — Z96.643 HISTORY OF BILATERAL HIP REPLACEMENTS: ICD-10-CM

## 2023-04-08 DIAGNOSIS — M25.551 HIP PAIN, RIGHT: ICD-10-CM

## 2023-04-08 LAB
ALBUMIN SERPL BCG-MCNC: 4 G/DL (ref 3.5–5.2)
ALP SERPL-CCNC: 63 U/L (ref 40–129)
ALT SERPL W P-5'-P-CCNC: 15 U/L (ref 10–50)
ANION GAP SERPL CALCULATED.3IONS-SCNC: 13 MMOL/L (ref 7–15)
AST SERPL W P-5'-P-CCNC: 25 U/L (ref 10–50)
BASOPHILS # BLD AUTO: 0.1 10E3/UL (ref 0–0.2)
BASOPHILS NFR BLD AUTO: 1 %
BILIRUB SERPL-MCNC: 0.6 MG/DL
BUN SERPL-MCNC: 10.5 MG/DL (ref 8–23)
CALCIUM SERPL-MCNC: 9.5 MG/DL (ref 8.8–10.2)
CHLORIDE SERPL-SCNC: 102 MMOL/L (ref 98–107)
CREAT SERPL-MCNC: 0.74 MG/DL (ref 0.67–1.17)
CRP SERPL-MCNC: 7.96 MG/L
DEPRECATED HCO3 PLAS-SCNC: 24 MMOL/L (ref 22–29)
EOSINOPHIL # BLD AUTO: 0.3 10E3/UL (ref 0–0.7)
EOSINOPHIL NFR BLD AUTO: 5 %
ERYTHROCYTE [DISTWIDTH] IN BLOOD BY AUTOMATED COUNT: 16.6 % (ref 10–15)
ERYTHROCYTE [SEDIMENTATION RATE] IN BLOOD BY WESTERGREN METHOD: 17 MM/HR (ref 0–20)
GFR SERPL CREATININE-BSD FRML MDRD: >90 ML/MIN/1.73M2
GLUCOSE SERPL-MCNC: 91 MG/DL (ref 70–99)
HCT VFR BLD AUTO: 45.4 % (ref 40–53)
HGB BLD-MCNC: 13.8 G/DL (ref 13.3–17.7)
HOLD SPECIMEN: NORMAL
IMM GRANULOCYTES # BLD: 0 10E3/UL
IMM GRANULOCYTES NFR BLD: 0 %
LYMPHOCYTES # BLD AUTO: 1.8 10E3/UL (ref 0.8–5.3)
LYMPHOCYTES NFR BLD AUTO: 24 %
MCH RBC QN AUTO: 28 PG (ref 26.5–33)
MCHC RBC AUTO-ENTMCNC: 30.4 G/DL (ref 31.5–36.5)
MCV RBC AUTO: 92 FL (ref 78–100)
MONOCYTES # BLD AUTO: 0.7 10E3/UL (ref 0–1.3)
MONOCYTES NFR BLD AUTO: 10 %
NEUTROPHILS # BLD AUTO: 4.5 10E3/UL (ref 1.6–8.3)
NEUTROPHILS NFR BLD AUTO: 60 %
NRBC # BLD AUTO: 0 10E3/UL
NRBC BLD AUTO-RTO: 0 /100
PLATELET # BLD AUTO: 293 10E3/UL (ref 150–450)
POTASSIUM SERPL-SCNC: 3.8 MMOL/L (ref 3.4–5.3)
PROT SERPL-MCNC: 6.6 G/DL (ref 6.4–8.3)
RBC # BLD AUTO: 4.93 10E6/UL (ref 4.4–5.9)
SODIUM SERPL-SCNC: 139 MMOL/L (ref 136–145)
WBC # BLD AUTO: 7.4 10E3/UL (ref 4–11)

## 2023-04-08 PROCEDURE — 99285 EMERGENCY DEPT VISIT HI MDM: CPT | Mod: 25 | Performed by: EMERGENCY MEDICINE

## 2023-04-08 PROCEDURE — 80053 COMPREHEN METABOLIC PANEL: CPT | Performed by: EMERGENCY MEDICINE

## 2023-04-08 PROCEDURE — 99223 1ST HOSP IP/OBS HIGH 75: CPT | Performed by: PHYSICIAN ASSISTANT

## 2023-04-08 PROCEDURE — 86140 C-REACTIVE PROTEIN: CPT | Performed by: EMERGENCY MEDICINE

## 2023-04-08 PROCEDURE — 36415 COLL VENOUS BLD VENIPUNCTURE: CPT | Performed by: EMERGENCY MEDICINE

## 2023-04-08 PROCEDURE — G0378 HOSPITAL OBSERVATION PER HR: HCPCS

## 2023-04-08 PROCEDURE — 85025 COMPLETE CBC W/AUTO DIFF WBC: CPT | Performed by: EMERGENCY MEDICINE

## 2023-04-08 PROCEDURE — 250N000012 HC RX MED GY IP 250 OP 636 PS 637: Performed by: PHYSICIAN ASSISTANT

## 2023-04-08 PROCEDURE — 250N000013 HC RX MED GY IP 250 OP 250 PS 637: Performed by: EMERGENCY MEDICINE

## 2023-04-08 PROCEDURE — 73502 X-RAY EXAM HIP UNI 2-3 VIEWS: CPT | Mod: 26 | Performed by: RADIOLOGY

## 2023-04-08 PROCEDURE — 99284 EMERGENCY DEPT VISIT MOD MDM: CPT | Performed by: EMERGENCY MEDICINE

## 2023-04-08 PROCEDURE — 250N000013 HC RX MED GY IP 250 OP 250 PS 637: Performed by: PHYSICIAN ASSISTANT

## 2023-04-08 PROCEDURE — 73502 X-RAY EXAM HIP UNI 2-3 VIEWS: CPT

## 2023-04-08 PROCEDURE — 85652 RBC SED RATE AUTOMATED: CPT | Performed by: EMERGENCY MEDICINE

## 2023-04-08 RX ORDER — ISOSORBIDE MONONITRATE 30 MG/1
30 TABLET, EXTENDED RELEASE ORAL DAILY
Status: DISCONTINUED | OUTPATIENT
Start: 2023-04-08 | End: 2023-04-10 | Stop reason: HOSPADM

## 2023-04-08 RX ORDER — ALBUTEROL SULFATE 5 MG/ML
2.5 SOLUTION RESPIRATORY (INHALATION) EVERY 6 HOURS PRN
Status: DISCONTINUED | OUTPATIENT
Start: 2023-04-08 | End: 2023-04-08

## 2023-04-08 RX ORDER — PREDNISONE 5 MG/1
5 TABLET ORAL DAILY
Status: DISCONTINUED | OUTPATIENT
Start: 2023-04-08 | End: 2023-04-10 | Stop reason: HOSPADM

## 2023-04-08 RX ORDER — ENTECAVIR 0.5 MG/1
0.5 TABLET, FILM COATED ORAL DAILY
Status: DISCONTINUED | OUTPATIENT
Start: 2023-04-08 | End: 2023-04-10 | Stop reason: HOSPADM

## 2023-04-08 RX ORDER — NALOXONE HYDROCHLORIDE 0.4 MG/ML
0.2 INJECTION, SOLUTION INTRAMUSCULAR; INTRAVENOUS; SUBCUTANEOUS
Status: DISCONTINUED | OUTPATIENT
Start: 2023-04-08 | End: 2023-04-10 | Stop reason: HOSPADM

## 2023-04-08 RX ORDER — ALBUTEROL SULFATE 90 UG/1
2 AEROSOL, METERED RESPIRATORY (INHALATION) EVERY 6 HOURS PRN
Status: CANCELLED | OUTPATIENT
Start: 2023-04-08

## 2023-04-08 RX ORDER — ONDANSETRON 4 MG/1
4 TABLET, ORALLY DISINTEGRATING ORAL EVERY 6 HOURS PRN
Status: DISCONTINUED | OUTPATIENT
Start: 2023-04-08 | End: 2023-04-10 | Stop reason: HOSPADM

## 2023-04-08 RX ORDER — LEVALBUTEROL INHALATION SOLUTION 1.25 MG/3ML
1.25 SOLUTION RESPIRATORY (INHALATION) EVERY 6 HOURS PRN
Status: DISCONTINUED | OUTPATIENT
Start: 2023-04-08 | End: 2023-04-10 | Stop reason: HOSPADM

## 2023-04-08 RX ORDER — LIDOCAINE 40 MG/G
CREAM TOPICAL
Status: DISCONTINUED | OUTPATIENT
Start: 2023-04-08 | End: 2023-04-10 | Stop reason: HOSPADM

## 2023-04-08 RX ORDER — MYCOPHENOLATE MOFETIL 250 MG/1
750 CAPSULE ORAL 2 TIMES DAILY
Status: DISCONTINUED | OUTPATIENT
Start: 2023-04-08 | End: 2023-04-10 | Stop reason: HOSPADM

## 2023-04-08 RX ORDER — OXYCODONE HYDROCHLORIDE 5 MG/1
5-10 TABLET ORAL EVERY 4 HOURS PRN
Status: DISCONTINUED | OUTPATIENT
Start: 2023-04-08 | End: 2023-04-10 | Stop reason: HOSPADM

## 2023-04-08 RX ORDER — NALOXONE HYDROCHLORIDE 0.4 MG/ML
0.4 INJECTION, SOLUTION INTRAMUSCULAR; INTRAVENOUS; SUBCUTANEOUS
Status: DISCONTINUED | OUTPATIENT
Start: 2023-04-08 | End: 2023-04-10 | Stop reason: HOSPADM

## 2023-04-08 RX ORDER — PANTOPRAZOLE SODIUM 40 MG/1
40 TABLET, DELAYED RELEASE ORAL DAILY
Status: DISCONTINUED | OUTPATIENT
Start: 2023-04-08 | End: 2023-04-10 | Stop reason: HOSPADM

## 2023-04-08 RX ORDER — ROSUVASTATIN CALCIUM 5 MG/1
20 TABLET, COATED ORAL DAILY
Status: DISCONTINUED | OUTPATIENT
Start: 2023-04-09 | End: 2023-04-10 | Stop reason: HOSPADM

## 2023-04-08 RX ORDER — ASPIRIN 81 MG/1
81 TABLET, CHEWABLE ORAL DAILY
Status: DISCONTINUED | OUTPATIENT
Start: 2023-04-09 | End: 2023-04-10 | Stop reason: HOSPADM

## 2023-04-08 RX ORDER — ACETAMINOPHEN 325 MG/1
975 TABLET ORAL EVERY 8 HOURS
Status: DISCONTINUED | OUTPATIENT
Start: 2023-04-08 | End: 2023-04-10 | Stop reason: HOSPADM

## 2023-04-08 RX ORDER — FLUOXETINE 40 MG/1
40 CAPSULE ORAL DAILY
Status: DISCONTINUED | OUTPATIENT
Start: 2023-04-08 | End: 2023-04-10 | Stop reason: HOSPADM

## 2023-04-08 RX ORDER — POLYETHYLENE GLYCOL 3350 17 G/17G
17 POWDER, FOR SOLUTION ORAL DAILY
Status: DISCONTINUED | OUTPATIENT
Start: 2023-04-08 | End: 2023-04-10 | Stop reason: HOSPADM

## 2023-04-08 RX ORDER — LIDOCAINE 4 G/G
1 PATCH TOPICAL
Status: DISCONTINUED | OUTPATIENT
Start: 2023-04-09 | End: 2023-04-10 | Stop reason: HOSPADM

## 2023-04-08 RX ORDER — ONDANSETRON 2 MG/ML
4 INJECTION INTRAMUSCULAR; INTRAVENOUS EVERY 6 HOURS PRN
Status: DISCONTINUED | OUTPATIENT
Start: 2023-04-08 | End: 2023-04-10 | Stop reason: HOSPADM

## 2023-04-08 RX ORDER — OXYCODONE HYDROCHLORIDE 5 MG/1
5 TABLET ORAL ONCE
Status: COMPLETED | OUTPATIENT
Start: 2023-04-08 | End: 2023-04-08

## 2023-04-08 RX ADMIN — OXYCODONE HYDROCHLORIDE 5 MG: 5 TABLET ORAL at 12:18

## 2023-04-08 RX ADMIN — POLYETHYLENE GLYCOL 3350 17 G: 17 POWDER, FOR SOLUTION ORAL at 16:33

## 2023-04-08 RX ADMIN — ACETAMINOPHEN 975 MG: 325 TABLET, FILM COATED ORAL at 16:27

## 2023-04-08 RX ADMIN — OXYCODONE HYDROCHLORIDE 5 MG: 5 TABLET ORAL at 21:16

## 2023-04-08 RX ADMIN — METOPROLOL TARTRATE 12.5 MG: 25 TABLET ORAL at 19:37

## 2023-04-08 RX ADMIN — MYCOPHENOLATE MOFETIL 750 MG: 250 CAPSULE ORAL at 19:37

## 2023-04-08 RX ADMIN — OXYCODONE HYDROCHLORIDE 5 MG: 5 TABLET ORAL at 16:27

## 2023-04-08 ASSESSMENT — ACTIVITIES OF DAILY LIVING (ADL)
ADLS_ACUITY_SCORE: 37
DEPENDENT_IADLS:: TRANSPORTATION
ADLS_ACUITY_SCORE: 37
ADLS_ACUITY_SCORE: 37

## 2023-04-08 NOTE — CONSULTS
Care Management Initial Consult    General Information  Assessment completed with: Patient, Family, Brother-Maximino  Type of CM/SW Visit: Initial Assessment    Primary Care Provider verified and updated as needed: Yes   Readmission within the last 30 days: no previous admission in last 30 days      Reason for Consult: discharge planning  Advance Care Planning: Advance Care Planning Reviewed: present on chart          Communication Assessment  Patient's communication style: spoken language (English or Bilingual)    Hearing Difficulty or Deaf: no        Cognitive  Cognitive/Neuro/Behavioral: WDL                      Living Environment:   People in home: other (see comments) (Roommate)     Current living Arrangements: house      Able to return to prior arrangements:  (TBD)       Family/Social Support:  Care provided by: self (Roommate helping out with laundry the past week. Unlikely to be able to provide additional support)  Provides care for: no one  Marital Status:   Sibling(s) (Friends; Mandaeism)          Description of Support System: Involved, Supportive    Support Assessment: Adequate social supports    Current Resources:   Patient receiving home care services: No     Community Resources: None  Equipment currently used at home:    Supplies currently used at home: None    Employment/Financial:  Employment Status:          Financial Concerns: No concerns identified   Referral to Financial Worker: No       Lifestyle & Psychosocial Needs:  Social Determinants of Health     Tobacco Use: Medium Risk (4/8/2023)    Patient History      Smoking Tobacco Use: Former      Smokeless Tobacco Use: Former      Passive Exposure: Not on file   Alcohol Use: Not on file   Financial Resource Strain: Not on file   Food Insecurity: Not on file   Transportation Needs: Not on file   Physical Activity: Not on file   Stress: Not on file   Social Connections: Not on file   Intimate Partner Violence: Not on file   Depression: Not at  risk (2/10/2023)    PHQ-2      PHQ-2 Score: 0   Housing Stability: Not on file       Functional Status:  Prior to admission patient needed assistance:   Dependent ADLs:: Ambulation-cane  Dependent IADLs:: Transportation (Utilizes [employer], Insurance or [employer])  Assesssment of Functional Status:  (TBD)    Mental Health Status:  Mental Health Status: No Current Concerns       Chemical Dependency Status:  Chemical Dependency Status: No Current Concerns             Values/Beliefs:  Spiritual, Cultural Beliefs, Confucianist Practices, Values that affect care: no               Additional Information:  Chart reviewed. Case discussed with bedside RN and medical provider.    Writer met with pt. Pt lives with a roommate. Pt is currently sepparated from his wife. Pt reports good social and emotional support. Pt reports he has been to TCU in the past and may be open to going to TCU again if recommended by PT.     Social work will continue to follow and provide assistance to ensure a safe and timely discharge.       ________________    ASAD Martinez, [facility]  ED/Observation   [facility]  Phone: [phone]  Pager: [phone]  Fax: [phone]    On-call pager, [phone], 4:00pm to midnight

## 2023-04-08 NOTE — H&P
[facility]    History and Physical - ED Observation Service       Date of Admission:  4/8/2023    Assessment & Plan    Jerad Ross is a 63 year old male with PMH of CAD s/p CABG, DDKT, HTN, anemia in CKD, chronic hepatitis B, asthma, JULIANE, hx avascular necrosis of b/l hips s/p b/l hip replacements, HLD, GERD, secondary renal hyperparathyroidism who presented to the ED on 4/8/23 with progressive right hip pain.    #Right hip pain  #History of avascular necrosis s/p b/l hip replacements revised in 2001 and 2005  Patient presents with progressive right hip pain and inability to walk. This has been progressing over some time, but worse within the last couple of weeks. He has had b/l hip arthroplasties and revisions on both sides, per chart last revision 2005. He follows with [facility] and there is plan for an upcoming revision. He was due to have a follow up on 4/10 for arthrocentesis to eval for infection and for pre-operative planning. He has been taking Oxycodone 5 mg q8h prn at home without relief, prompting his ED visit. Denies fevers or chills at home. In the ED, he is afebrile. No leukocytosis. Creatinine 0.74 (baseline). CRP 7.96, which is down trending from 10.4 on 4/6/23. Xray right hip shows postoperative changes of b/l total hip arthroplasty. Additional wire fixation bilaterally. There is a fracture of the right cerclage wire about the greater trochanter. No evidence for acute fracture on either side. Calcifications about both hip joints but these have not significantly changed. ED provider d/w orthopedics on call who recommended WBAT and patient to f/u with his outpatient providers as planned. Reassuring that his inflammatory markers are down trending. There was not concern for septic joint. He is admitted to ED observation for PT evaluation and pain control.  -PT evaluation  -Acetaminophen 975 mg q8h scheduled  -Oxycodone 5-10 mg q4h prn  -Lidoderm  "patch  -SW consult for dispo planning  -Follow daily labs and inflammatory markers  -Daily miralax    #DKKT  Creatinine at baseline, 0.74. Follows with transplant nephrology, last seen 4/6/23.  -Continue PTA mycophenolate mofetil, prednisone    #HTN  #CAD s/p CABG  #HTN  -Continue PTA Aspirin, Imdur, Metoprolol, statin    #Chronic hepatitis B  -Continue PTA Entecavir    #Asthma  -Hold PTA inhalers while on observation status  -Albuterol as needed    #GERD  -Continue PTA PPI     Diet:  Regular  DVT Prophylaxis: Low Risk/Ambulatory with no VTE prophylaxis indicated and Ambulate every shift  [place] Catheter: Not present  Lines: None     Cardiac Monitoring: None  Code Status:  Full    Clinically Significant Risk Factors Present on Admission                       # Overweight: Estimated body mass index is 28.89 kg/m  as calculated from the following:    Height as of this encounter: 1.727 m (5' 8\").    Weight as of this encounter: 86.2 kg (190 lb).           Disposition Plan      Expected Discharge Date: 04/09/2023                The patient's care was discussed with the Patient and ED provider.    Rosa Sr PA-C  ED Observation Service  [facility]  Securely message with [employer] (more info)  Text page via [facility] Paging/Directory     ______________________________________________________________________    Chief Complaint   Right hip pain    History is obtained from the patient    History of Present Illness   Per ED: \"Jerad Ross is a 63 year old male with past medical history significant for NSTEMI, CAD s/p CABG, ESRD and FSGS s/p kidney transplant (1978, 1993), HTN, HLD, chronic hepatitis B, status post bilateral hip replacements (1981 with revisions 2001, 2005) who presents to the ED for worsening progressive right hip pain, inability to walk.  Patient reports that he has had a remote right hip arthroplasty in the 1980s, had it revised in the 1990s.  He is currently " "seeing a Dr. Bonner at [facility].  Has a plan to follow-up with the physician on the 10th to undergo arthrocentesis for evaluation for infection and for preoperative planning for a second revision.  However, the patient reports that he is unable to deal with the pain anymore.  He is currently taking 5 mg of oral oxycodone every approximately 8 hours for his pain according to the directions\"    Past Medical History    Past Medical History:   Diagnosis Date     Acute midline low back pain without sciatica      AION (acute ischemic optic neuropathy)      AION (acute ischemic optic neuropathy)      Anemia in chronic renal disease      Anemia in chronic renal disease      Avascular necrosis of bones of both hips (H)     s/p bilateral hip replacements     Avascular necrosis of bones of both hips (H)     s/p bilateral hip replacements     Basal cell carcinoma      Basal cell carcinoma      Chronic hepatitis B (H)      Chronic hepatitis B (H)      Clostridium difficile colitis      Clostridium difficile colitis      Coronary artery disease involving native coronary artery of native heart without angina pectoris 6/17/2014    Coronary angiogram 6/17/14: Severe distal 3-vessel disease involving small vessels, not amenable to PCI or CABG.     Coronary artery disease involving native coronary artery of native heart without angina pectoris 06/17/2014    Coronary angiogram 6/17/14: Severe distal 3-vessel disease involving small vessels, not amenable to PCI or CABG     CRP elevated      Depression      Depression      Diverticulosis      Diverticulosis      Dyslipidemia      Dyslipidemia      Elevated C-reactive protein (CRP)      FSGS (focal segmental glomerulosclerosis)      FSGS (focal segmental glomerulosclerosis)      Gastric ulcer with hemorrhage 2/12/12     Gastric ulcer with hemorrhage 02/12/2012     GERD (gastroesophageal reflux disease)      GERD (gastroesophageal reflux disease)      Glaucoma     OHTN     " Glaucoma      Hemorrhoids      Hemorrhoids      HTN (hypertension)      Hyperlipidemia      Hypertension secondary to other renal disorders      Hypogonadism in male      Hypogonadism in male      Immunosuppressed status (H)      Kidney replaced by transplant     focal glomerulosclerosis      Kidney transplanted     focal glomerulosclerosis      NSTEMI (non-ST elevated myocardial infarction) (H) 6/17/2014     NSTEMI (non-ST elevated myocardial infarction) (H) 06/17/2014     JULIANE (obstructive sleep apnea)     Doesn't use CPAP     JULIANE (obstructive sleep apnea)      Paracentral scotoma     LE     Paracentral scotoma     LE     Secondary renal hyperparathyroidism (H)      Secondary renal hyperparathyroidism (H)      Septic bursitis      Squamous cell carcinoma      Squamous cell carcinoma        Past Surgical History   Past Surgical History:   Procedure Laterality Date     APPENDECTOMY       APPENDECTOMY       Cardiac Bypass surgery  06/08/2020    [facility]      CATARACT EXTRACTION EXTRACAPSULAR W/ INTRAOCULAR LENS IMPLANTATION Bilateral 4-20-10, 6-1-10     CATARACT IOL, RT/LT  4/19/2000    RE     CATARACT IOL, RT/LT  6/1/2000    LE     COLECTOMY PARTIAL  1983     10 cm, diverticulitis      COLECTOMY SUBTOTAL  1983    10 cm, diverticulitis     COLONOSCOPY  2/13/2012    Procedure:COLONOSCOPY; Surgeon:SLOAN GALLARDO; Location: GI     COLONOSCOPY N/A 1/22/2020    Procedure: Colonoscopy, With Polypectomy And Biopsy;  Surgeon: Aston Kiran MD;  Location:  GI     COLONOSCOPY  02/13/2012     CV CORONARY ANGIOGRAM N/A 6/2/2020    Procedure: Coronary Angiogram;  Surgeon: Alona Florentino MD;  Location: [facility] Cath Lab;  Service: Cardiology     CV CORONARY ANGIOGRAM N/A 9/20/2021    Procedure: Coronary Angiogram;  Surgeon: Adam Agudelo MD;  Location: [facility] CATH LAB CV     CV LEFT HEART CATHETERIZATION WITHOUT LEFT VENTRICULOGRAM Left 6/2/2020    Procedure: Left Heart Catheterization Without  Left Ventriculogram;  Surgeon: Alona Florentino MD;  Location: [facility] Cath Lab;  Service: Cardiology     CV SUPRAVALVULAR AORTOGRAM N/A 9/20/2021    Procedure: Supravalvular Aortagram;  Surgeon: Adam Agudelo MD;  Location: [facility] CATH LAB CV     ESOPHAGOSCOPY, GASTROSCOPY, DUODENOSCOPY (EGD), COMBINED  2/13/2012    Procedure:COMBINED ESOPHAGOSCOPY, GASTROSCOPY, DUODENOSCOPY (EGD); Surgeon:SLOAN GALLARDO; Location: GI     ESOPHAGOSCOPY, GASTROSCOPY, DUODENOSCOPY (EGD), COMBINED  02/13/2012     EXTRACAPSULAR CATARACT EXTRATION WITH INTRAOCULAR LENS IMPLANT  4-20-10, 6-1-10    Rt, Lt     HERNIA REPAIR  1995    Lt inguinal     HERNIA REPAIR  1995    Lt inguinal     HIP SURGERY      1981, bilat MITZI, revised 2001, 2005     HIP SURGERY  1981    bilat MITZI, revised 2001, 2005     KIDNEY SURGERY  1978 and 1993    transplant     KIDNEY SURGERY  1978, 1993    transplant     KNEE SURGERY  1983, 1987    bilat TKA     KNEE SURGERY Bilateral 1983, 1987     MOHS MICROGRAPHIC PROCEDURE       MOHS MICROGRAPHIC PROCEDURE       OTHER SURGICAL HISTORY      kidney [id number], 1993     PHACOEMULSIFICATION CLEAR CORNEA WITH STANDARD INTRAOCULAR LENS IMPLANT Right 04/19/2000     PHACOEMULSIFICATION CLEAR CORNEA WITH STANDARD INTRAOCULAR LENS IMPLANT Left 06/01/2000     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, auxiliary spleen     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, auxiliary spleen     TONSILLECTOMY       TONSILLECTOMY         Prior to Admission Medications   Prior to Admission Medications   Prescriptions Last Dose Informant Patient Reported? Taking?   FLUoxetine (PROZAC) 40 MG capsule   No No   Sig: Take 1 capsule (40 mg) by mouth daily   Multiple Vitamins-Iron (ONE DAILY MULTIVITAMIN/IRON) TABS   Yes No   Sig: Take 1 tablet by mouth daily   Omega-3 Fatty Acids (OMEGA 3 PO)   Yes No   Sig: Take 1 capsule by mouth daily Dose unknown   acetaminophen (TYLENOL) 325 MG tablet   Yes No   Sig: Take 1-2 tablets by mouth every  6 hours as needed.   albuterol (PROAIR HFA) 108 (90 Base) MCG/ACT inhaler   No No   Sig: Inhale 2 puffs into the lungs every 6 hours as needed for shortness of breath / dyspnea or wheezing   aspirin 81 MG tablet   Yes No   Sig: Take 81 mg by mouth daily    budesonide (PULMICORT FLEXHALER) 90 MCG/ACT inhaler   No No   Sig: Inhale 2 puffs into the lungs 2 times daily   calcium citrate-vitamin D (CITRACAL) 315-200 MG-UNIT TABS   Yes No   Sig: Take 1 tablet by mouth daily.   entecavir (BARACLUDE) 0.5 MG tablet   No No   Sig: Take 1 tablet (0.5 mg) by mouth daily APPT NEEDED FOR FURTHER REFILLS   fluticasone-salmeterol (ADVAIR) 250-50 MCG/ACT inhaler   No No   Sig: Inhale 1 puff into the lungs every 12 hours   furosemide (LASIX) 20 MG tablet   No No   Sig: Take 1 tablet (20 mg) by mouth daily as needed (fluid retention)   isosorbide mononitrate (IMDUR) 30 MG 24 hr tablet   No No   Sig: TAKE 1 TABLET(30 MG) BY MOUTH DAILY   latanoprost (XALATAN) 0.005 % ophthalmic solution   No No   Sig: Place 1 drop into both eyes At Bedtime   metoprolol tartrate (LOPRESSOR) 25 MG tablet   No No   Sig: TAKE 1/2 TABLET(12.5 MG) BY MOUTH TWICE DAILY   mycophenolate (GENERIC EQUIVALENT) 250 MG capsule   No No   Sig: Take 3 capsules (750 mg) by mouth 2 times daily   nitroGLYcerin (NITROSTAT) 0.4 MG sublingual tablet   No No   Sig: Place 1 tablet (0.4 mg) under the tongue every 5 minutes as needed for chest pain   pantoprazole (PROTONIX) 40 MG EC tablet   No No   Sig: Take 1 tablet (40 mg) by mouth daily   polyethylene glycol (MIRALAX) 17 g packet   Yes No   Sig: Take 17 g by mouth daily as needed    potassium chloride ER (KLOR-CON M) 20 MEQ CR tablet   No No   Sig: Take 1 tablet (20 mEq) by mouth daily   predniSONE (DELTASONE) 5 MG tablet   No No   Sig: Take 1 tablet (5 mg) by mouth daily   rosuvastatin (CRESTOR) 20 MG tablet   No No   Sig: TAKE 1 TABLET(20 MG) BY MOUTH DAILY   tacrolimus (PROTOPIC) 0.1 % external ointment   No No   Sig:  Apply topically 2 times daily      Facility-Administered Medications: None        Review of Systems    The 10 point Review of Systems is negative other than noted in the HPI or here.      Physical Exam   Vital Signs: Temp: 98  F (36.7  C) Temp src: Oral BP: (!) 150/90 Pulse: 73   Resp: 18 SpO2: 96 % O2 Device: None (Room air)    Weight: 190 lbs 0 oz  Exam:  Constitutional: alert, no distress, and cooperative  Head: normocephalic, atraumatic  Neck: no asymmetry, masses, or scars  ENT: throat normal without erythema or exudate  Cardiovascular: RRR  Respiratory: diminished, respirations unlabored  Gastrointestinal: (+) BS, non tender, soft  Musculoskeletal: normal muscle tone, no pitting edema, no warmth/redness of the right hip, ROM of the right hip is fair but slightly limited by pain  Skin: no suspicious lesions or rashes  Neurologic: oriented x 3, moves all extremities, no slurred speech  Psychiatric: normal affect and mood    Medical Decision Making             Data     I have personally reviewed the following data over the past 24 hrs:    7.4  \   13.8   / 293     139 102 10.5 /  91   3.8 24 0.74 \       ALT: 15 AST: 25 AP: 63 TBILI: 0.6   ALB: 4.0 TOT PROTEIN: 6.6 LIPASE: N/A       Procal: N/A CRP: 7.96 (H) Lactic Acid: N/A         Imaging results reviewed over the past 24 hrs:   Recent Results (from the past 24 hour(s))   XR Hip Right 2-3 Views    Narrative    EXAM: XR HIP RIGHT 2-3 VIEWS  LOCATION: [facility]  DATE/TIME: 04/08/2023, 12:50 PM    INDICATION: Status post hip arthroplasty with worsening, severe pain, inability to bear weight.  COMPARISON: 06/01/2015.      Impression    IMPRESSION: Postoperative changes of bilateral total hip arthroplasty. Additional wire fixation bilaterally. There is a fracture of the right cerclage wire about the greater trochanter. No evidence for acute fracture on either side. Calcifications about   both hip joints but these have  not significantly changed.

## 2023-04-08 NOTE — ED PROVIDER NOTES
[facility] (Formerly [facility])    4/08/23       ED PROVIDER NOTE    History   No chief complaint on file.  Right hip pain, inability to walk    MIGUELANGEL Ross is a 63 year old male with past medical history significant for NSTEMI, CAD s/p CABG, ESRD and FSGS s/p kidney transplant (1978, 1993), HTN, HLD, chronic hepatitis B, status post bilateral hip replacements (1981 with revisions 2001, 2005) who presents to the ED for worsening progressive right hip pain, inability to walk.  Patient reports that he has had a remote right hip arthroplasty in the 1980s, had it revised in the 1990s.  He is currently seeing a Dr. Bonner at [employer] [facility].  Has a plan to follow-up with the physician on the 10th to undergo arthrocentesis for evaluation for infection and for preoperative planning for a second revision.  However, the patient reports that he is unable to deal with the pain anymore.  He is currently taking 5 mg of oral oxycodone every approximately 8 hours for his pain according to the directions    Past Medical History  Past Medical History:   Diagnosis Date     Acute midline low back pain without sciatica      AION (acute ischemic optic neuropathy)      AION (acute ischemic optic neuropathy)      Anemia in chronic renal disease      Anemia in chronic renal disease      Avascular necrosis of bones of both hips (H)     s/p bilateral hip replacements     Avascular necrosis of bones of both hips (H)     s/p bilateral hip replacements     Basal cell carcinoma      Basal cell carcinoma      Chronic hepatitis B (H)      Chronic hepatitis B (H)      Clostridium difficile colitis      Clostridium difficile colitis      Coronary artery disease involving native coronary artery of native heart without angina pectoris 6/17/2014    Coronary angiogram 6/17/14: Severe distal 3-vessel disease involving small vessels, not amenable to PCI or CABG.     Coronary artery disease involving native coronary  artery of native heart without angina pectoris 06/17/2014    Coronary angiogram 6/17/14: Severe distal 3-vessel disease involving small vessels, not amenable to PCI or CABG     CRP elevated      Depression      Depression      Diverticulosis      Diverticulosis      Dyslipidemia      Dyslipidemia      Elevated C-reactive protein (CRP)      FSGS (focal segmental glomerulosclerosis)      FSGS (focal segmental glomerulosclerosis)      Gastric ulcer with hemorrhage 2/12/12     Gastric ulcer with hemorrhage 02/12/2012     GERD (gastroesophageal reflux disease)      GERD (gastroesophageal reflux disease)      Glaucoma     OHTN     Glaucoma      Hemorrhoids      Hemorrhoids      HTN (hypertension)      Hyperlipidemia      Hypertension secondary to other renal disorders      Hypogonadism in male      Hypogonadism in male      Immunosuppressed status (H)      Kidney replaced by transplant     focal glomerulosclerosis      Kidney transplanted     focal glomerulosclerosis      NSTEMI (non-ST elevated myocardial infarction) (H) 6/17/2014     NSTEMI (non-ST elevated myocardial infarction) (H) 06/17/2014     JULIANE (obstructive sleep apnea)     Doesn't use CPAP     JULIANE (obstructive sleep apnea)      Paracentral scotoma     LE     Paracentral scotoma     LE     Secondary renal hyperparathyroidism (H)      Secondary renal hyperparathyroidism (H)      Septic bursitis      Squamous cell carcinoma      Squamous cell carcinoma      Past Surgical History:   Procedure Laterality Date     APPENDECTOMY       APPENDECTOMY       Cardiac Bypass surgery  06/08/2020    [facility]      CATARACT EXTRACTION EXTRACAPSULAR W/ INTRAOCULAR LENS IMPLANTATION Bilateral 4-20-10, 6-1-10     CATARACT IOL, RT/LT  4/19/2000    RE     CATARACT IOL, RT/LT  6/1/2000    LE     COLECTOMY PARTIAL  1983     10 cm, diverticulitis      COLECTOMY SUBTOTAL  1983    10 cm, diverticulitis     COLONOSCOPY  2/13/2012    Procedure:COLONOSCOPY; Surgeon:SLOAN GALLARDO  H; Location:[facility]     COLONOSCOPY N/A 1/22/2020    Procedure: Colonoscopy, With Polypectomy And Biopsy;  Surgeon: Aston Kiran MD;  Location: [facility]     COLONOSCOPY  02/13/2012     CV CORONARY ANGIOGRAM N/A 6/2/2020    Procedure: Coronary Angiogram;  Surgeon: Alona Florentino MD;  Location: [facility] Lab;  Service: Cardiology     CV CORONARY ANGIOGRAM N/A 9/20/2021    Procedure: Coronary Angiogram;  Surgeon: Adam Agudelo MD;  Location: [facility] CV     CV LEFT HEART CATHETERIZATION WITHOUT LEFT VENTRICULOGRAM Left 6/2/2020    Procedure: Left Heart Catheterization Without Left Ventriculogram;  Surgeon: Alona Florentino MD;  Location: [facility] Cath Lab;  Service: Cardiology     CV SUPRAVALVULAR AORTOGRAM N/A 9/20/2021    Procedure: Supravalvular Aortagram;  Surgeon: Adma Agudelo MD;  Location: [facility] CV     ESOPHAGOSCOPY, GASTROSCOPY, DUODENOSCOPY (EGD), COMBINED  2/13/2012    Procedure:COMBINED ESOPHAGOSCOPY, GASTROSCOPY, DUODENOSCOPY (EGD); Surgeon:SLOAN GALLARDO; Location: GI     ESOPHAGOSCOPY, GASTROSCOPY, DUODENOSCOPY (EGD), COMBINED  02/13/2012     EXTRACAPSULAR CATARACT EXTRATION WITH INTRAOCULAR LENS IMPLANT  4-20-10, 6-1-10    Rt, Lt     HERNIA REPAIR  1995    Lt inguinal     HERNIA REPAIR  1995    Lt inguinal     HIP SURGERY      1981, bilat MITZI, revised 2001, 2005     HIP SURGERY  1981    bilat MITZI, revised 2001, 2005     KIDNEY SURGERY  1978 and 1993    transplant     KIDNEY SURGERY  1978, 1993    transplant     KNEE SURGERY  1983, 1987    bilat TKA     KNEE SURGERY Bilateral 1983, 1987     MOHS MICROGRAPHIC PROCEDURE       MOHS MICROGRAPHIC PROCEDURE       OTHER SURGICAL HISTORY      kidney [id number], 1993     PHACOEMULSIFICATION CLEAR CORNEA WITH STANDARD INTRAOCULAR LENS IMPLANT Right 04/19/2000     PHACOEMULSIFICATION CLEAR CORNEA WITH STANDARD INTRAOCULAR LENS IMPLANT Left 06/01/2000     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, auxiliary  "spleen     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    Allergies   Allergen Reactions     Penicillins Shortness Of Breath and Hives     Keflex [Cephalexin Hcl] Other (See Comments)     Pt could not recall reaction     Tetracycline Other (See Comments)     Patient could not recall reaction     Sulfa Drugs Rash     Family History  Family History   Problem Relation Age of Onset     Cardiovascular Father         AI with valve repair     Hypertension Father      Kidney Disease Father      Other - See Comments Father         AI with valve repair     Cerebrovascular Disease Maternal Grandfather      Other - See Comments Maternal Grandfather         cerebrovascular disease     Cancer Paternal Grandmother         ovarian ca     Ovarian Cancer Paternal Grandmother      Cerebrovascular Disease Paternal Grandfather      Kidney Disease Paternal Aunt      Kidney Disease Paternal Aunt      Skin Cancer No family hx of      Glaucoma No family hx of      Macular Degeneration No family hx of      Amblyopia No family hx of      Melanoma No family hx of      Diabetes No family hx of      Social History   Social History     Tobacco Use     Smoking status: Former     Packs/day: 1.00     Years: 9.00     Pack years: 9.00     Types: Cigarettes     Quit date: 1988     Years since quittin.2     Smokeless tobacco: Former   Substance Use Topics     Alcohol use: Yes     Alcohol/week: 0.0 standard drinks of alcohol     Comment: rarely 1 drink per month     Drug use: No         A medically appropriate review of systems was performed with pertinent positives and negatives noted in the HPI, and all other systems negative.    Physical Exam   BP: (!) 150/90  Pulse: 73  Temp: 98  F (36.7  C)  Resp: 18  Height: 172.7 cm (5' 8\")  Weight: 86.2 kg (190 lb)  SpO2: 96 %  Physical Exam  Constitutional:       General: He is awake. He is not in acute " distress.     Appearance: Normal appearance. He is well-developed. He is not ill-appearing or toxic-appearing.   HENT:      Head: Atraumatic.   Eyes:      General: No scleral icterus.     Pupils: Pupils are equal, round, and reactive to light.   Cardiovascular:      Rate and Rhythm: Normal rate and regular rhythm.      Heart sounds: Normal heart sounds, S1 normal and S2 normal.   Pulmonary:      Effort: No respiratory distress.      Breath sounds: Normal breath sounds.   Abdominal:      General: Bowel sounds are normal.      Palpations: Abdomen is soft.      Tenderness: There is no abdominal tenderness.   Musculoskeletal:         General: No tenderness.      Comments: Tender to palpation over the right posterior greater trochanter.  Range of motion of the right hip limited secondary to pain.    Skin:     General: Skin is warm.      Findings: No rash.   Neurological:      Mental Status: He is alert and oriented to person, place, and time.   Psychiatric:         Mood and Affect: Mood normal.         Speech: Speech normal.         Behavior: Behavior is cooperative.         ED Course, Procedures, & Data      Procedures                   Results for orders placed or performed during the hospital encounter of 04/08/23   XR Hip Right 2-3 Views     Status: None    Narrative    EXAM: XR HIP RIGHT 2-3 VIEWS  LOCATION: [facility]  DATE/TIME: 04/08/2023, 12:50 PM    INDICATION: Status post hip arthroplasty with worsening, severe pain, inability to bear weight.  COMPARISON: 06/01/2015.      Impression    IMPRESSION: Postoperative changes of bilateral total hip arthroplasty. Additional wire fixation bilaterally. There is a fracture of the right cerclage wire about the greater trochanter. No evidence for acute fracture on either side. Calcifications about   both hip joints but these have not significantly changed.       Comprehensive metabolic panel     Status: Normal   Result Value  Ref Range    Sodium 139 136 - 145 mmol/L    Potassium 3.8 3.4 - 5.3 mmol/L    Chloride 102 98 - 107 mmol/L    Carbon Dioxide (CO2) 24 22 - 29 mmol/L    Anion Gap 13 7 - 15 mmol/L    Urea Nitrogen 10.5 8.0 - 23.0 mg/dL    Creatinine 0.74 0.67 - 1.17 mg/dL    Calcium 9.5 8.8 - 10.2 mg/dL    Glucose 91 70 - 99 mg/dL    Alkaline Phosphatase 63 40 - 129 U/L    AST 25 10 - 50 U/L    ALT 15 10 - 50 U/L    Protein Total 6.6 6.4 - 8.3 g/dL    Albumin 4.0 3.5 - 5.2 g/dL    Bilirubin Total 0.6 <=1.2 mg/dL    GFR Estimate >90 >60 mL/min/1.73m2   CRP inflammation     Status: Abnormal   Result Value Ref Range    CRP Inflammation 7.96 (H) <5.00 mg/L   Erythrocyte sedimentation rate auto     Status: Normal   Result Value Ref Range    Erythrocyte Sedimentation Rate 17 0 - 20 mm/hr   CBC with platelets and differential     Status: Abnormal   Result Value Ref Range    WBC Count 7.4 4.0 - 11.0 10e3/uL    RBC Count 4.93 4.40 - 5.90 10e6/uL    Hemoglobin 13.8 13.3 - 17.7 g/dL    Hematocrit 45.4 40.0 - 53.0 %    MCV 92 78 - 100 fL    MCH 28.0 26.5 - 33.0 pg    MCHC 30.4 (L) 31.5 - 36.5 g/dL    RDW 16.6 (H) 10.0 - 15.0 %    Platelet Count 293 150 - 450 10e3/uL    % Neutrophils 60 %    % Lymphocytes 24 %    % Monocytes 10 %    % Eosinophils 5 %    % Basophils 1 %    % Immature Granulocytes 0 %    NRBCs per 100 WBC 0 <1 /100    Absolute Neutrophils 4.5 1.6 - 8.3 10e3/uL    Absolute Lymphocytes 1.8 0.8 - 5.3 10e3/uL    Absolute Monocytes 0.7 0.0 - 1.3 10e3/uL    Absolute Eosinophils 0.3 0.0 - 0.7 10e3/uL    Absolute Basophils 0.1 0.0 - 0.2 10e3/uL    Absolute Immature Granulocytes 0.0 <=0.4 10e3/uL    Absolute NRBCs 0.0 10e3/uL   [place] Draw     Status: None    Narrative    The following orders were created for panel order [place] Draw.  Procedure                               Abnormality         Status                     ---------                               -----------         ------                     Extra Blue Top Tube[[id number]]                               Final result               Extra Red Top Tube[[id number]]                               Final result               Extra Purple Top Tube[[id number]]                            Final result                 Please view results for these tests on the individual orders.   Extra Blue Top Tube     Status: None   Result Value Ref Range    Hold Specimen JIC    Extra Red Top Tube     Status: None   Result Value Ref Range    Hold Specimen JIC    Extra Purple Top Tube     Status: None   Result Value Ref Range    Hold Specimen JIC    CBC with platelets differential     Status: Abnormal    Narrative    The following orders were created for panel order CBC with platelets differential.  Procedure                               Abnormality         Status                     ---------                               -----------         ------                     CBC with platelets and d...[[id number]]  Abnormal            Final result                 Please view results for these tests on the individual orders.     Medications   entecavir (BARACLUDE) tablet 0.5 mg (0.5 mg Oral Not Given 4/8/23 1630)   FLUoxetine (PROzac) capsule 40 mg (40 mg Oral Not Given 4/8/23 1628)   isosorbide mononitrate (IMDUR) 24 hr tablet 30 mg (30 mg Oral Not Given 4/8/23 1629)   metoprolol tartrate (LOPRESSOR) half-tab 12.5 mg (has no administration in time range)   mycophenolate (GENERIC EQUIVALENT) capsule 750 mg (has no administration in time range)   pantoprazole (PROTONIX) EC tablet 40 mg (40 mg Oral Not Given 4/8/23 1629)   polyethylene glycol (MIRALAX) Packet 17 g (17 g Oral $Given 4/8/23 1633)   predniSONE (DELTASONE) tablet 5 mg (5 mg Oral Not Given 4/8/23 1629)   rosuvastatin (CRESTOR) tablet 20 mg (has no administration in time range)   melatonin tablet 1 mg (has no administration in time range)   ondansetron (ZOFRAN ODT) ODT tab 4 mg (has no administration in time range)     Or   ondansetron (ZOFRAN) injection 4 mg (has no  administration in time range)   lidocaine 1 % 0.1-1 mL (has no administration in time range)   lidocaine (LMX4) cream (has no administration in time range)   sodium chloride (PF) 0.9% PF flush 3 mL (has no administration in time range)   sodium chloride (PF) 0.9% PF flush 3 mL (3 mLs Intracatheter $Given 4/8/23 1633)   acetaminophen (TYLENOL) tablet 975 mg (975 mg Oral $Given 4/8/23 1627)   oxyCODONE (ROXICODONE) tablet 5-10 mg (5 mg Oral $Given 4/8/23 1627)   Lidocaine (LIDOCARE) 4 % Patch 1 patch (has no administration in time range)   lidocaine patch in PLACE ( Transdermal Patch Free Period 4/8/23 1631)   aspirin (ASA) chewable tablet 81 mg (has no administration in time range)   naloxone (NARCAN) injection 0.2 mg (has no administration in time range)     Or   naloxone (NARCAN) injection 0.4 mg (has no administration in time range)     Or   naloxone (NARCAN) injection 0.2 mg (has no administration in time range)     Or   naloxone (NARCAN) injection 0.4 mg (has no administration in time range)   levalbuterol (XOPENEX) neb solution 1.25 mg (has no administration in time range)   oxyCODONE (ROXICODONE) tablet 5 mg (5 mg Oral $Given 4/8/23 1218)     Labs Ordered and Resulted from Time of ED Arrival to Time of ED Departure   CRP INFLAMMATION - Abnormal       Result Value    CRP Inflammation 7.96 (*)    CBC WITH PLATELETS AND DIFFERENTIAL - Abnormal    WBC Count 7.4      RBC Count 4.93      Hemoglobin 13.8      Hematocrit 45.4      MCV 92      MCH 28.0      MCHC 30.4 (*)     RDW 16.6 (*)     Platelet Count 293      % Neutrophils 60      % Lymphocytes 24      % Monocytes 10      % Eosinophils 5      % Basophils 1      % Immature Granulocytes 0      NRBCs per 100 WBC 0      Absolute Neutrophils 4.5      Absolute Lymphocytes 1.8      Absolute Monocytes 0.7      Absolute Eosinophils 0.3      Absolute Basophils 0.1      Absolute Immature Granulocytes 0.0      Absolute NRBCs 0.0     COMPREHENSIVE METABOLIC PANEL - Normal     Sodium 139      Potassium 3.8      Chloride 102      Carbon Dioxide (CO2) 24      Anion Gap 13      Urea Nitrogen 10.5      Creatinine 0.74      Calcium 9.5      Glucose 91      Alkaline Phosphatase 63      AST 25      ALT 15      Protein Total 6.6      Albumin 4.0      Bilirubin Total 0.6      GFR Estimate >90     ERYTHROCYTE SEDIMENTATION RATE AUTO - Normal    Erythrocyte Sedimentation Rate 17       XR Hip Right 2-3 Views   Final Result   IMPRESSION: Postoperative changes of bilateral total hip arthroplasty. Additional wire fixation bilaterally. There is a fracture of the right cerclage wire about the greater trochanter. No evidence for acute fracture on either side. Calcifications about    both hip joints but these have not significantly changed.                   Critical care was not performed.     Medical Decision Making  The patient's presentation was of moderate complexity (a chronic illness mild to moderate exacerbation, progression, or side effect of treatment).    The patient's evaluation involved:  ordering and/or review of 3+ test(s) in this encounter (see separate area of note for details)  review of 3+ test result(s) ordered prior to this encounter (Chem, CBC, CRP)  discussion of management or test interpretation with another health professional (Observation ALISHA)    The patient's management necessitated high risk (a decision regarding hospitalization).      Assessment & Plan    Jerad Ross is a 63 year old male with past medical history significant for NSTEMI, CAD s/p CABG, ESRD and FSGS s/p kidney transplant (1978, 1993), HTN, HLD, chronic hepatitis B, status post bilateral hip replacements (1981 with revisions 2001, 2005) who presents to the ED for worsening progressive right hip pain, inability to walk.  Concerning for septic arthritis although my suspicion for this is somewhat low given the subacute nature of it.  We will send screening labs however and obtain an x-ray.  A periprosthetic  fracture is certainly possible.  Degeneration of his arthroplasty is certainly on the differential and most likely.  I am concerned about his ability to go home and care for himself so even if his work-up is reassuring, I expect that he will need overnight admission and observation with evaluation by physical therapy versus home discharge with pain control depending on how he is feeling.    I have reviewed the nursing notes. I have reviewed the findings, diagnosis, plan and need for follow up with the patient.    Labs with minimal elevation in CRP, it is actually improved in comparison with his lab test from 4/6.  This makes a diagnosis of septic arthritis even less likely.  Right hip x-ray without any acute changes.  Patient received a dose of oxycodone here, states he is feeling better from the standpoint of his pain.  In the lengthy discussion with the patient, he prefers to undergo ED observation and get evaluated by physical therapy due to concern for caring for himself at home.  Although he lives with a roommate, he feels like he will not be able to get sufficient help and resources at home.  Otherwise, patient to follow-up with orthopedics at his regularly scheduled appointments this next week.  We will proceed with admission to ED observation.    Current Discharge Medication List          Final diagnoses:   Hip pain, right       Filemon Da Silva MD PhD  [facility] EMERGENCY DEPARTMENT  4/8/2023     Filemon Da Silva MD  04/08/23 0364

## 2023-04-08 NOTE — ED TRIAGE NOTES
Patient presents to the ED with hip pain. Pt states they originally had hip replaced in early 80's and then revision in 1996. Pt states they have appointment 4/10 for aspiration of hip to look for infection but pain worsening and feels they can not wait. Pt states they are taking Oxycodone 5mg for pain, last dose 0900 today. Pt states they also are s/p Kidney TxP 1993. Pt states they did not take anti-rejection medication this morning.      Triage Assessment     Row Name 04/08/23 1121       Triage Assessment (Adult)    Airway WDL WDL       Respiratory WDL    Respiratory WDL WDL       Skin Circulation/Temperature WDL    Skin Circulation/Temperature WDL WDL       Cardiac WDL    Cardiac WDL WDL       Peripheral/Neurovascular WDL    Peripheral Neurovascular WDL WDL       Cognitive/Neuro/Behavioral WDL    Cognitive/Neuro/Behavioral WDL WDL

## 2023-04-09 ENCOUNTER — APPOINTMENT (OUTPATIENT)
Dept: PHYSICAL THERAPY | Facility: CLINIC | Age: 63
End: 2023-04-09
Attending: PHYSICIAN ASSISTANT
Payer: COMMERCIAL

## 2023-04-09 LAB
ALBUMIN SERPL BCG-MCNC: 3.7 G/DL (ref 3.5–5.2)
ALP SERPL-CCNC: 57 U/L (ref 40–129)
ALT SERPL W P-5'-P-CCNC: 14 U/L (ref 10–50)
ANION GAP SERPL CALCULATED.3IONS-SCNC: 10 MMOL/L (ref 7–15)
AST SERPL W P-5'-P-CCNC: 22 U/L (ref 10–50)
BASOPHILS # BLD AUTO: 0 10E3/UL (ref 0–0.2)
BASOPHILS NFR BLD AUTO: 0 %
BILIRUB SERPL-MCNC: 0.6 MG/DL
BUN SERPL-MCNC: 12.4 MG/DL (ref 8–23)
CALCIUM SERPL-MCNC: 9.4 MG/DL (ref 8.8–10.2)
CHLORIDE SERPL-SCNC: 104 MMOL/L (ref 98–107)
CREAT SERPL-MCNC: 0.77 MG/DL (ref 0.67–1.17)
CRP SERPL-MCNC: 19.1 MG/L
DEPRECATED HCO3 PLAS-SCNC: 25 MMOL/L (ref 22–29)
EOSINOPHIL # BLD AUTO: 0.3 10E3/UL (ref 0–0.7)
EOSINOPHIL NFR BLD AUTO: 4 %
ERYTHROCYTE [DISTWIDTH] IN BLOOD BY AUTOMATED COUNT: 16.4 % (ref 10–15)
ERYTHROCYTE [SEDIMENTATION RATE] IN BLOOD BY WESTERGREN METHOD: 19 MM/HR (ref 0–20)
GFR SERPL CREATININE-BSD FRML MDRD: >90 ML/MIN/1.73M2
GLUCOSE SERPL-MCNC: 96 MG/DL (ref 70–99)
HCT VFR BLD AUTO: 39.8 % (ref 40–53)
HGB BLD-MCNC: 12.5 G/DL (ref 13.3–17.7)
IMM GRANULOCYTES # BLD: 0 10E3/UL
IMM GRANULOCYTES NFR BLD: 0 %
LYMPHOCYTES # BLD AUTO: 1.9 10E3/UL (ref 0.8–5.3)
LYMPHOCYTES NFR BLD AUTO: 30 %
MCH RBC QN AUTO: 28.5 PG (ref 26.5–33)
MCHC RBC AUTO-ENTMCNC: 31.4 G/DL (ref 31.5–36.5)
MCV RBC AUTO: 91 FL (ref 78–100)
MONOCYTES # BLD AUTO: 0.8 10E3/UL (ref 0–1.3)
MONOCYTES NFR BLD AUTO: 12 %
NEUTROPHILS # BLD AUTO: 3.3 10E3/UL (ref 1.6–8.3)
NEUTROPHILS NFR BLD AUTO: 54 %
NRBC # BLD AUTO: 0 10E3/UL
NRBC BLD AUTO-RTO: 0 /100
PLATELET # BLD AUTO: 265 10E3/UL (ref 150–450)
POTASSIUM SERPL-SCNC: 4 MMOL/L (ref 3.4–5.3)
PROT SERPL-MCNC: 6.2 G/DL (ref 6.4–8.3)
RBC # BLD AUTO: 4.39 10E6/UL (ref 4.4–5.9)
SODIUM SERPL-SCNC: 139 MMOL/L (ref 136–145)
WBC # BLD AUTO: 6.3 10E3/UL (ref 4–11)

## 2023-04-09 PROCEDURE — 94640 AIRWAY INHALATION TREATMENT: CPT

## 2023-04-09 PROCEDURE — 85025 COMPLETE CBC W/AUTO DIFF WBC: CPT | Performed by: PHYSICIAN ASSISTANT

## 2023-04-09 PROCEDURE — 97116 GAIT TRAINING THERAPY: CPT | Mod: GP | Performed by: PHYSICAL THERAPIST

## 2023-04-09 PROCEDURE — G0378 HOSPITAL OBSERVATION PER HR: HCPCS

## 2023-04-09 PROCEDURE — 86140 C-REACTIVE PROTEIN: CPT | Performed by: PHYSICIAN ASSISTANT

## 2023-04-09 PROCEDURE — 999N000157 HC STATISTIC RCP TIME EA 10 MIN

## 2023-04-09 PROCEDURE — 36415 COLL VENOUS BLD VENIPUNCTURE: CPT | Performed by: PHYSICIAN ASSISTANT

## 2023-04-09 PROCEDURE — 99233 SBSQ HOSP IP/OBS HIGH 50: CPT | Performed by: PHYSICIAN ASSISTANT

## 2023-04-09 PROCEDURE — 85652 RBC SED RATE AUTOMATED: CPT | Performed by: PHYSICIAN ASSISTANT

## 2023-04-09 PROCEDURE — 97530 THERAPEUTIC ACTIVITIES: CPT | Mod: GP | Performed by: PHYSICAL THERAPIST

## 2023-04-09 PROCEDURE — 97161 PT EVAL LOW COMPLEX 20 MIN: CPT | Mod: GP | Performed by: PHYSICAL THERAPIST

## 2023-04-09 PROCEDURE — 80053 COMPREHEN METABOLIC PANEL: CPT | Performed by: PHYSICIAN ASSISTANT

## 2023-04-09 PROCEDURE — 250N000009 HC RX 250: Performed by: INTERNAL MEDICINE

## 2023-04-09 PROCEDURE — 250N000012 HC RX MED GY IP 250 OP 636 PS 637: Performed by: PHYSICIAN ASSISTANT

## 2023-04-09 PROCEDURE — 250N000013 HC RX MED GY IP 250 OP 250 PS 637: Performed by: PHYSICIAN ASSISTANT

## 2023-04-09 RX ADMIN — OXYCODONE HYDROCHLORIDE 10 MG: 5 TABLET ORAL at 01:26

## 2023-04-09 RX ADMIN — LEVALBUTEROL HYDROCHLORIDE 1.25 MG: 1.25 SOLUTION RESPIRATORY (INHALATION) at 10:52

## 2023-04-09 RX ADMIN — MYCOPHENOLATE MOFETIL 750 MG: 250 CAPSULE ORAL at 20:22

## 2023-04-09 RX ADMIN — METOPROLOL TARTRATE 12.5 MG: 25 TABLET ORAL at 08:19

## 2023-04-09 RX ADMIN — ACETAMINOPHEN 975 MG: 325 TABLET, FILM COATED ORAL at 16:08

## 2023-04-09 RX ADMIN — OXYCODONE HYDROCHLORIDE 10 MG: 5 TABLET ORAL at 23:05

## 2023-04-09 RX ADMIN — ACETAMINOPHEN 975 MG: 325 TABLET, FILM COATED ORAL at 00:06

## 2023-04-09 RX ADMIN — ENTECAVIR 0.5 MG: 0.5 TABLET ORAL at 08:16

## 2023-04-09 RX ADMIN — OXYCODONE HYDROCHLORIDE 5 MG: 5 TABLET ORAL at 06:49

## 2023-04-09 RX ADMIN — LIDOCAINE PATCH 4% 1 PATCH: 40 PATCH TOPICAL at 08:18

## 2023-04-09 RX ADMIN — PANTOPRAZOLE SODIUM 40 MG: 40 TABLET, DELAYED RELEASE ORAL at 08:16

## 2023-04-09 RX ADMIN — POLYETHYLENE GLYCOL 3350 17 G: 17 POWDER, FOR SOLUTION ORAL at 08:13

## 2023-04-09 RX ADMIN — FLUOXETINE HYDROCHLORIDE 40 MG: 40 CAPSULE ORAL at 08:15

## 2023-04-09 RX ADMIN — MYCOPHENOLATE MOFETIL 750 MG: 250 CAPSULE ORAL at 08:16

## 2023-04-09 RX ADMIN — OXYCODONE HYDROCHLORIDE 10 MG: 5 TABLET ORAL at 11:10

## 2023-04-09 RX ADMIN — ISOSORBIDE MONONITRATE 30 MG: 30 TABLET, EXTENDED RELEASE ORAL at 08:16

## 2023-04-09 RX ADMIN — METOPROLOL TARTRATE 12.5 MG: 25 TABLET ORAL at 20:22

## 2023-04-09 RX ADMIN — OXYCODONE HYDROCHLORIDE 5 MG: 5 TABLET ORAL at 18:56

## 2023-04-09 RX ADMIN — ACETAMINOPHEN 975 MG: 325 TABLET, FILM COATED ORAL at 23:05

## 2023-04-09 RX ADMIN — ASPIRIN 81 MG 81 MG: 81 TABLET ORAL at 08:15

## 2023-04-09 RX ADMIN — ROSUVASTATIN CALCIUM 20 MG: 5 TABLET, FILM COATED ORAL at 08:16

## 2023-04-09 RX ADMIN — ACETAMINOPHEN 975 MG: 325 TABLET, FILM COATED ORAL at 08:15

## 2023-04-09 RX ADMIN — PREDNISONE 5 MG: 5 TABLET ORAL at 08:16

## 2023-04-09 ASSESSMENT — ACTIVITIES OF DAILY LIVING (ADL)
ADLS_ACUITY_SCORE: 37

## 2023-04-09 NOTE — CONSULTS
INTERVENTIONAL RADIOLOGY CONSULT NOTE    Name: Jerad Ross  Age: 63 year old       SUBJECTIVE: Patient originally planned to have right hip joint aspiration as an outpatient on 4/10 to rule out infection for preprocedural planning (arthroplasty revision). IR asked to perform arthrocentesis while he is inpatient on 4/10. Hx bilateral hip arthroplasty 2/2 avascular necrosis. Progressive right hip pain over 1-2 weeks.    PMH of CAD s/p CABG on ASA, DDKT, HTN, anemia in CKD, chronic hepatitis B, asthma, JULIANE, hx avascular necrosis of b/l hips s/p b/l hip replacements, HLD, GERD, secondary renal hyperparathyroidism.    IMAGING: Right hip radiograph from 4/8 shows hip prosthesis.    ASSESSMENT/PLAN: Nonurgent right hip needle arthrocentesis tentatively to be performed early this week, time TBD. This can be done using fluoroscopy +/- ultrasound under local anesthesia. ASA and DVT prophylaxis does not need to be held. Consent to be obtained on day of procedure.      Thank you for this consultation:    Wellington Connolly DO, MS                PGY-6 Interventional Radiology   [facility]   10:36 AM April 9, 2023

## 2023-04-09 NOTE — PROGRESS NOTES
04/09/23 0853   Appointment Info   Signing Clinician's Name / Credentials (PT) Leah Hartman, PT, DPT   Living Environment   People in Home   (roommate)   Current Living Arrangements house   Home Accessibility stairs to enter home;stairs within home   Number of Stairs, Main Entrance 4   Stair Railings, Main Entrance railings safe and in good condition;railing on left side (ascending);railing on right side (ascending)   Number of Stairs, Within Home, Primary other (see comments)  (14)   Stair Railings, Within Home, Primary railing on right side (ascending)   Transportation Anticipated car, drives self;family or friend will provide   Living Environment Comments Pt has a car and a 's license, minimal driving recently 2/2 eyesight changes. Pt otherwise uses Ly[employer] or medical transportation for medical appointments. Pt's bedroom and bathroom are upstairs. Pt has a tub/shower. Pt's friend is coming to install GB for the tub on Tuesday 4/11/23. On the main floor, no bedroom or bathroom present. Pt occassionally sleeps on the couch on the main floor.   Self-Care   Usual Activity Tolerance moderate   Current Activity Tolerance fair   Regular Exercise No   Equipment Currently Used at Home cane, straight   Fall history within last six months no   Number of times patient has fallen within last six months 0   Activity/Exercise/Self-Care Comment Pt's roommate helps bringing clothes upstairs from laundry, would not be able to assist with mobility or cooking/cleaning tasks. Pt had a near fall on the ice a few months ago. Uses a SPC in L hand for mobility. Increased difficulty with stairs and ambulation the past 2 weeks 2/2 R hip pain.   General Information   Onset of Illness/Injury or Date of Surgery 04/08/23   Referring Physician Rosa Sr PA-C   Patient/Family Therapy Goals Statement (PT) have less hip pain   Pertinent History of Current Problem (include personal factors and/or comorbidities that impact the POC) Pt  is a 63 year old male with PMH of CAD s/p CABG, DDKT, HTN, anemia in CKD, chronic hepatitis B, asthma, JULIANE, hx avascular necrosis of b/l hips s/p b/l hip replacements, HLD, GERD, secondary renal hyperparathyroidism who presented to the ED on 4/8/23 with progressive right hip pain.   Existing Precautions/Restrictions fall   General Observations Activity: Ambulate with assist   Cognition   Affect/Mental Status (Cognition) WNL   Orientation Status (Cognition) oriented x 4   Follows Commands (Cognition) WNL   Safety Deficit (Cognition) minimal deficit   Pain Assessment   Patient Currently in Pain Yes, see Vital Sign flowsheet   Integumentary/Edema   Integumentary/Edema Comments Healed scars on B knees from prior surgeries   Posture    Posture Protracted shoulders;Forward head position   Range of Motion (ROM)   ROM Comment BLE ROM WFL   Strength (Manual Muscle Testing)   Strength Comments B ankle and knee ROM WFL, no hip strength assessed 2/2 pain. Pt requires heavy use of UEs to stand from EOB 2/2 LE weakness.   Bed Mobility   Comment, (Bed Mobility) Supine>sitting EOB with CGA, use of bed railing and HOB elevated to 45 degree incline   Transfers   Comment, (Transfers) Sit>stand with CGA, heavy use of UEs pushing from EOB to stand, FWW   Gait/Stairs (Locomotion)   Comment, (Gait/Stairs) Pt ambualted 20 ft into bathroom with FWW, CGA. Pt picks up FWW, ambulates with significant increase in WBing through BUEs 2/2 R hip pain, flexed posture. Reports 9/10 pain in R hip.   Balance   Balance Comments Pt ambulates with CGA with FWW, CGA-min A for ambulation with SPC in RUE, unsteady   Clinical Impression   Criteria for Skilled Therapeutic Intervention Yes, treatment indicated   PT Diagnosis (PT) impiared functional mobility   Influenced by the following impairments R hip pain, LE weakness, impaired balance   Functional limitations due to impairments difficulty with bed mobility, transfers, ambulation, stairs   Clinical  Presentation (PT Evaluation Complexity) Evolving/Changing   Clinical Presentation Rationale medical status, level of impairments, clinical judgemnet   Clinical Decision Making (Complexity) moderate complexity   Planned Therapy Interventions (PT) bed mobility training;gait training;home exercise program;home program guidelines;risk factor education;progressive activity/exercise;transfer training;strengthening;stair training;patient/family education   Anticipated Equipment Needs at Discharge (PT) dressing equipment;raised toilet seat;tub bench;walker, rolling   Risk & Benefits of therapy have been explained evaluation/treatment results reviewed;risks/benefits reviewed;care plan/treatment goals reviewed;current/potential barriers reviewed;participants voiced agreement with care plan;participants included;patient   PT Total Evaluation Time   PT Eval, Low Complexity Minutes (36101) 15   Physical Therapy Goals   PT Frequency 5x/week   PT Predicted Duration/Target Date for Goal Attainment 04/16/23   PT Goals Bed Mobility;Transfers;Gait;Stairs   PT: Bed Mobility Independent;Supine to/from sit   PT: Transfers Sit to/from stand;Modified independent;Assistive device   PT: Gait Modified independent;100 feet;Rolling walker   PT: Stairs Modified independent;Greater than 10 stairs;Rail on right   Interventions   Interventions Quick Adds Gait Training;Therapeutic Activity   PT Discharge Planning   PT Plan PT PLAN: Progress ambualtion distance with FWW, safety on stairs, R hip strengthening   PT Discharge Recommendation (DC Rec) Transitional Care Facility   PT Rationale for DC Rec Pt currently needs Ax1 for mobility including ambulation and stair negotiation. Pt lives alone, has a roomate but they are unable to help with physical mobility/ADLs. Pt has stairs to enter and 14 steps to bedroom/bathroom, no bathroom on main level. Pt limited by LE weakness and R hip pain. Would benefit from TCU to improve strength and independence with  mobility/ADLs prior to discharge to home.   PT Brief overview of current status Ax1 with GB + FWW   Total Session Time   Total Session Time (sum of timed and untimed services) 15

## 2023-04-09 NOTE — PLAN OF CARE
"Outpatient/Observation goals to be met before discharge home:  Goal Outcome Evaluation:  -diagnostic tests and consults completed and resulted - Not met, arthroscopy surgery to be rescheduled outpatient. Plan for IR to perform aspiration of R) Hip tomorrow to check for infection.   -vital signs normal or at patient baseline - Met  /84 (BP Location: Left arm)   Pulse 77   Temp 98.7  F (37.1  C) (Oral)   Resp 16   Ht 1.727 m (5' 8\")   Wt 86.2 kg (190 lb)   SpO2 95%   BMI 28.89 kg/m    -adequate pain control on oral analgesics - In progress, pain improved /c oxycodone this am, increased pain with movement.  -returns to baseline functional status - In progress, PT consulted, confirmed with this RN that pt is definitely an Assist x1 /c gait belt and walker, recommending TCU.  -safe disposition plan has been identified - Not met    "

## 2023-04-09 NOTE — PLAN OF CARE
"Goal Outcome Evaluation:  /78 (BP Location: Left arm)   Pulse 62   Temp 98.8  F (37.1  C) (Oral)   Resp 16   Ht 1.727 m (5' 8\")   Wt 86.2 kg (190 lb)   SpO2 94%   BMI 28.89 kg/m       -diagnostic tests and consults completed and resulted Not met   -vital signs normal or at patient baseline Met  -adequate pain control on oral analgesics In progress  -returns to baseline functional status In progress  -safe disposition plan has been identified Not met  "

## 2023-04-09 NOTE — PLAN OF CARE
"Goal Outcome Evaluation:  BP (!) 125/94 (BP Location: Left arm)   Pulse 70   Temp 97.8  F (36.6  C) (Oral)   Resp 16   Ht 1.727 m (5' 8\")   Wt 86.2 kg (190 lb)   SpO2 96%   BMI 28.89 kg/m        -diagnostic tests and consults completed and resulted Not met   -vital signs normal or at patient baseline Met  -adequate pain control on oral analgesics In progress  -returns to baseline functional status In progress  -safe disposition plan has been identified Not met  "

## 2023-04-09 NOTE — PLAN OF CARE
"Outpatient/Observation goals to be met before discharge home:  Goal Outcome Evaluation:  -diagnostic tests and consults completed and resulted - Not met, arthroscopy with plan for aspiration of R) Hip tomorrow to check for infection.   -vital signs normal or at patient baseline - Met  /85 (BP Location: Left arm)   Pulse 68   Temp 97.9  F (36.6  C) (Oral)   Resp 16   Ht 1.727 m (5' 8\")   Wt 86.2 kg (190 lb)   SpO2 96%   BMI 28.89 kg/m    -adequate pain control on oral analgesics - In progress, pain improved /c oxycodone this am, increased pain with movement.  -returns to baseline functional status - In progress, pt ambulated to and from bathroom twice overnight with stand by assist per RN report this am.  -safe disposition plan has been identified - Not met    "

## 2023-04-09 NOTE — PROGRESS NOTES
[facility]    Medicine Progress Note - ED Observation Service    Date of Admission:  4/8/2023    Assessment & Plan   Jerad Ross is a 63 year old male with PMH of CAD s/p CABG, DDKT, HTN, anemia in CKD, chronic hepatitis B, asthma, JULIANE, hx avascular necrosis of b/l hips s/p b/l hip replacements, HLD, GERD, secondary renal hyperparathyroidism who presented to the ED on 4/8/23 with progressive right hip pain.     #Right hip pain  #History of avascular necrosis s/p b/l hip replacements revised in 2001 and 2005  Patient presents with progressive right hip pain and inability to walk. This has been progressing over some time, but worse within the last couple of weeks. He has had b/l hip arthroplasties and revisions on both sides, per chart last revision 2005. He follows with [facility] and there is plan for an upcoming revision. He was due to have a follow up on 4/10 for arthrocentesis to eval for infection and for pre-operative planning. He has been taking Oxycodone 5 mg q8h prn at home without relief, prompting his ED visit. Denies fevers or chills at home. In the ED, he is afebrile. No leukocytosis. Creatinine 0.74 (baseline). CRP 7.96, which is down trending from 10.4 on 4/6/23. Xray right hip shows postoperative changes of b/l total hip arthroplasty. Additional wire fixation bilaterally. There is a fracture of the right cerclage wire about the greater trochanter. No evidence for acute fracture on either side. Calcifications about both hip joints but these have not significantly changed. ED provider d/w orthopedics on call who recommended WBAT and patient to f/u with his outpatient providers as planned. Reassuring that his inflammatory markers are down trending. Concern for septic joint was felt to be low. He is admitted to ED observation for PT evaluation and pain control. PT evaluated patient and are recommending TCU. CRP is up to 19 this morning. Remains  "afebrile, no leukocytosis. D/w orthopedics given that pt will miss his outpatient appt tomorrow for arthrocentesis. Recommended reaching out to IR. D/w IR who will perform the procedure tomorrow. Patient does not need to be NPO.  -IR consult for arthrocentesis tomorrow  -Acetaminophen 975 mg q8h scheduled  -Oxycodone 5-10 mg q4h prn  -Lidoderm patch  -SW consult for dispo planning  -Follow daily labs and inflammatory markers  -Daily miralax  -PT recommending TCU  -SW consult for dispo planning     #DKKT  Creatinine at baseline, 0.7. Follows with transplant nephrology, last seen 4/6/23.  -Continue PTA mycophenolate mofetil, prednisone     #HTN  #CAD s/p CABG  #HTN  -Continue PTA Aspirin, Imdur, Metoprolol, statin     #Chronic hepatitis B  -Continue PTA Entecavir     #Asthma  -Hold PTA inhalers while on observation status  -Albuterol as needed     #GERD  -Continue PTA PPI     Diet: Regular Diet Adult    DVT Prophylaxis: Low Risk/Ambulatory with no VTE prophylaxis indicated and Ambulate every shift  [place] Catheter: Not present  Lines: None     Cardiac Monitoring: None  Code Status: Full Code      Clinically Significant Risk Factors Present on Admission                       # Overweight: Estimated body mass index is 28.89 kg/m  as calculated from the following:    Height as of this encounter: 1.727 m (5' 8\").    Weight as of this encounter: 86.2 kg (190 lb).           Disposition Plan      Expected Discharge Date: 04/09/2023                The patient's care was discussed with the Attending Physician, Dr. Rushing, Bedside Nurse, Care Coordinator/ and Patient.    Rosa Sr PA-C  ED Observation Service  [facility]  Securely message with [employer] (more info)  Text page via [facility] Paging/Directory   ______________________________________________________________________    Interval History   Patient states his pain is variable, 4-8/10. Worse with activity or " movement. Feels a little wheezy this morning at times but not short of breath.    Physical Exam   Vital Signs: Temp: 97.7  F (36.5  C) Temp src: Oral BP: (!) 141/95 Pulse: 66   Resp: 16 SpO2: 96 % O2 Device: None (Room air)    Weight: 190 lbs 0 oz  Exam:  Constitutional: alert, no distress, and cooperative  Head: normocephalic, atraumatic  Neck: no asymmetry, masses, or scars  ENT: throat normal without erythema or exudate  Cardiovascular: RRR  Respiratory: diminished, respirations unlabored  Gastrointestinal: (+) BS, non tender, soft  Musculoskeletal: normal muscle tone, no pitting edema, no redness/warmth over right hip joint, right hip ROM limited due to pain  Skin: no suspicious lesions or rashes  Neurologic: oriented x 3, moves all extremities, no slurred speech  Psychiatric: normal affect and mood    Medical Decision Making             Data     I have personally reviewed the following data over the past 24 hrs:    6.3  \   12.5 (L)   / 265     139 104 12.4 /  96   4.0 25 0.77 \       ALT: 14 AST: 22 AP: 57 TBILI: 0.6   ALB: 3.7 TOT PROTEIN: 6.2 (L) LIPASE: N/A       Procal: N/A CRP: 19.10 (H) Lactic Acid: N/A         Imaging results reviewed over the past 24 hrs:   Recent Results (from the past 24 hour(s))   XR Hip Right 2-3 Views    Narrative    EXAM: XR HIP RIGHT 2-3 VIEWS  LOCATION: [facility]  DATE/TIME: 04/08/2023, 12:50 PM    INDICATION: Status post hip arthroplasty with worsening, severe pain, inability to bear weight.  COMPARISON: 06/01/2015.      Impression    IMPRESSION: Postoperative changes of bilateral total hip arthroplasty. Additional wire fixation bilaterally. There is a fracture of the right cerclage wire about the greater trochanter. No evidence for acute fracture on either side. Calcifications about   both hip joints but these have not significantly changed.

## 2023-04-09 NOTE — PLAN OF CARE
"Goal Outcome Evaluation:  /74 (BP Location: Left arm)   Pulse 71   Temp 97.7  F (36.5  C) (Oral)   Resp 16   Ht 1.727 m (5' 8\")   Wt 86.2 kg (190 lb)   SpO2 96%   BMI 28.89 kg/m    -diagnostic tests and consults completed and resulted Not met, arthroscopy planned to check for infection.   -vital signs normal or at patient baseline Met  -adequate pain control on oral analgesics In progress, pt had 6-8/10 pain overnight controlled with oxycodone.  -returns to baseline functional status In progress, pt ambulated to and from bathroom twice during the shift with stand by assist.   -safe disposition plan has been identified Not met    "

## 2023-04-09 NOTE — PLAN OF CARE
"Outpatient/Observation goals to be met before discharge home:   Goal Outcome Evaluation:    Plan of Care Reviewed With: patient  Overall Patient Progress: no changeOverall Patient Progress: no change    -diagnostic tests and consults completed and resulted - Not Met  -vital signs normal or at patient baseline - Met  /79 (BP Location: Right arm)   Pulse 59   Temp 97.6  F (36.4  C) (Oral)   Resp 16   Ht 1.727 m (5' 8\")   Wt 86.2 kg (190 lb)   SpO2 97%   BMI 28.89 kg/m    -adequate pain control on oral analgesics - In process  -returns to baseline functional status - Not Met  -safe disposition plan has been identified - Not Met  Nurse to notify provider when observation goals have been met and patient is ready for discharge  "

## 2023-04-09 NOTE — PROGRESS NOTES
Care Management Follow Up    Length of Stay (days): 0    Expected Discharge Date: 04/09/2023     Concerns to be Addressed: discharge planning     Patient plan of care discussed at interdisciplinary rounds: Yes    Anticipated Discharge Disposition: Transitional Care     Anticipated Discharge Services: None  Anticipated Discharge DME: None    Patient/family educated on Medicare website which has current facility and service quality ratings: yes  Education Provided on the Discharge Plan:    Patient/Family in Agreement with the Plan: yes    Referrals Placed by CM/SW: Post Acute Facilities    Referrals have been made to the following facilities and their status is as follows:    [facility]  P: [phone]  P: [phone] - Admissions  F: [phone]  - Writer preemptively faxed referral packet for SNF to review.    [address]  [address]  P: [phone]  P: [phone] - Admissions  F: [phone]  - Writer preemptively faxed referral packet for SNF to review.    [facility]  [address] [place]  [address]  P: [phone]  P: [phone] - Admissions  F: [phone]  - Writer preemptively faxed referral packet for SNF to review.    [place] [address]  [address]  P: [phone]  F: [phone]  - Writer preemptively faxed referral packet for SNF to review.    [facility]   [address] [place] [address]  P: [phone]  P: [phone] - Admissions  F: [phone]  - Writer preemptively faxed referral packet for SNF to review.    [facility]  [address] [place]  [address]  P: [phone]  P: [phone] - Admissions  F: [phone]  - Writer preemptively faxed referral packet for SNF to review.    [facility]  [address] [place] [address]  P: [phone]  P: [phone]  -Writer preemptively faxed referral packet for SNF to  review.    [facility]   [address] [place]  [address]  P: [phone]  P: [phone] - Admissions  F: [phone]  - Writer preemptively faxed referral packet for SNF to review.    [facility]  [address] [place]  [address]  P: [phone]  F: [phone]  - Writer preemptively faxed referral packet for SNF to review.    [facility]  [address]  P: [phone]  F: [phone]  - Writer preemptively faxed referral packet for SNF to review.    Private pay costs discussed: Not applicable    Additional Information:  Discussed potential discharge needs to include TCU. List of Medicare certified options reviewed with patient/family/caregiver. Offered list and patient's right to choose among available options. Explained any financial ties to [facility].     ________________    ASAD Martinez, [facility]  ED/Observation   [facility]  Phone: [phone]  Pager: [phone]  Fax: [phone]    On-call pager, [phone], 4:00pm to midnight

## 2023-04-09 NOTE — PLAN OF CARE
"Outpatient/Observation goals to be met before discharge home:  Goal Outcome Evaluation:  -diagnostic tests and consults completed and resulted - In process, Plan for IR to perform aspiration of R) Hip tomorrow to check for infection. Arthroscopy surgery to be rescheduled outpatient.   -vital signs normal or at patient baseline - Met  /84 (BP Location: Left arm)   Pulse 77   Temp 98.7  F (37.1  C) (Oral)   Resp 16   Ht 1.727 m (5' 8\")   Wt 86.2 kg (190 lb)   SpO2 95%   BMI 28.89 kg/m    -adequate pain control on oral analgesics - In progress, pain managed with schedule Tylenol and prn oxycodone. Pt reports increased pain with movement.  -returns to baseline functional status - In progress, PT consulted, confirmed with this RN that pt is definitely an Assist x1 /c gait belt and walker, recommending TCU.  -safe disposition plan has been identified - Not met    "

## 2023-04-10 ENCOUNTER — APPOINTMENT (OUTPATIENT)
Dept: INTERVENTIONAL RADIOLOGY/VASCULAR | Facility: CLINIC | Age: 63
End: 2023-04-10
Attending: NURSE PRACTITIONER
Payer: COMMERCIAL

## 2023-04-10 VITALS
BODY MASS INDEX: 28.79 KG/M2 | WEIGHT: 190 LBS | DIASTOLIC BLOOD PRESSURE: 86 MMHG | OXYGEN SATURATION: 96 % | HEIGHT: 68 IN | HEART RATE: 64 BPM | RESPIRATION RATE: 18 BRPM | SYSTOLIC BLOOD PRESSURE: 120 MMHG | TEMPERATURE: 98.7 F

## 2023-04-10 LAB
ANION GAP SERPL CALCULATED.3IONS-SCNC: 12 MMOL/L (ref 7–15)
APPEARANCE FLD: ABNORMAL
BASOPHILS # BLD AUTO: 0 10E3/UL (ref 0–0.2)
BASOPHILS NFR BLD AUTO: 1 %
BUN SERPL-MCNC: 16.4 MG/DL (ref 8–23)
CALCIUM SERPL-MCNC: 9.4 MG/DL (ref 8.8–10.2)
CELL COUNT BODY FLUID SOURCE: ABNORMAL
CHLORIDE SERPL-SCNC: 103 MMOL/L (ref 98–107)
COLOR FLD: ABNORMAL
CREAT SERPL-MCNC: 0.72 MG/DL (ref 0.67–1.17)
CRP SERPL-MCNC: 13.7 MG/L
CRYSTALS SNV MICRO: NORMAL
DEPRECATED HCO3 PLAS-SCNC: 24 MMOL/L (ref 22–29)
EOSINOPHIL # BLD AUTO: 0.3 10E3/UL (ref 0–0.7)
EOSINOPHIL NFR BLD AUTO: 4 %
ERYTHROCYTE [DISTWIDTH] IN BLOOD BY AUTOMATED COUNT: 16.5 % (ref 10–15)
ERYTHROCYTE [SEDIMENTATION RATE] IN BLOOD BY WESTERGREN METHOD: 20 MM/HR (ref 0–20)
GFR SERPL CREATININE-BSD FRML MDRD: >90 ML/MIN/1.73M2
GLUCOSE SERPL-MCNC: 99 MG/DL (ref 70–99)
HCT VFR BLD AUTO: 42.5 % (ref 40–53)
HGB BLD-MCNC: 12.6 G/DL (ref 13.3–17.7)
IMM GRANULOCYTES # BLD: 0 10E3/UL
IMM GRANULOCYTES NFR BLD: 0 %
LYMPHOCYTES # BLD AUTO: 1.8 10E3/UL (ref 0.8–5.3)
LYMPHOCYTES NFR BLD AUTO: 22 %
LYMPHOCYTES NFR FLD MANUAL: 24 %
MCH RBC QN AUTO: 28.4 PG (ref 26.5–33)
MCHC RBC AUTO-ENTMCNC: 29.6 G/DL (ref 31.5–36.5)
MCV RBC AUTO: 96 FL (ref 78–100)
MONOCYTES # BLD AUTO: 1 10E3/UL (ref 0–1.3)
MONOCYTES NFR BLD AUTO: 13 %
MONOS+MACROS NFR FLD MANUAL: 71 %
NEUTROPHILS # BLD AUTO: 4.8 10E3/UL (ref 1.6–8.3)
NEUTROPHILS NFR BLD AUTO: 60 %
NEUTS BAND NFR FLD MANUAL: 5 %
NRBC # BLD AUTO: 0 10E3/UL
NRBC BLD AUTO-RTO: 0 /100
PLATELET # BLD AUTO: 241 10E3/UL (ref 150–450)
POTASSIUM SERPL-SCNC: 4.1 MMOL/L (ref 3.4–5.3)
RBC # BLD AUTO: 4.44 10E6/UL (ref 4.4–5.9)
SODIUM SERPL-SCNC: 139 MMOL/L (ref 136–145)
WBC # BLD AUTO: 8 10E3/UL (ref 4–11)
WBC # FLD AUTO: 3270 /UL

## 2023-04-10 PROCEDURE — 94640 AIRWAY INHALATION TREATMENT: CPT

## 2023-04-10 PROCEDURE — 89060 EXAM SYNOVIAL FLUID CRYSTALS: CPT | Performed by: PHYSICIAN ASSISTANT

## 2023-04-10 PROCEDURE — 87075 CULTR BACTERIA EXCEPT BLOOD: CPT | Performed by: PHYSICIAN ASSISTANT

## 2023-04-10 PROCEDURE — 89050 BODY FLUID CELL COUNT: CPT | Performed by: PHYSICIAN ASSISTANT

## 2023-04-10 PROCEDURE — 77002 NEEDLE LOCALIZATION BY XRAY: CPT | Mod: 26 | Performed by: PHYSICIAN ASSISTANT

## 2023-04-10 PROCEDURE — 85652 RBC SED RATE AUTOMATED: CPT | Performed by: PHYSICIAN ASSISTANT

## 2023-04-10 PROCEDURE — 999N000157 HC STATISTIC RCP TIME EA 10 MIN

## 2023-04-10 PROCEDURE — 20610 DRAIN/INJ JOINT/BURSA W/O US: CPT | Mod: RT | Performed by: PHYSICIAN ASSISTANT

## 2023-04-10 PROCEDURE — 80048 BASIC METABOLIC PNL TOTAL CA: CPT | Performed by: PHYSICIAN ASSISTANT

## 2023-04-10 PROCEDURE — 99238 HOSP IP/OBS DSCHRG MGMT 30/<: CPT

## 2023-04-10 PROCEDURE — G0378 HOSPITAL OBSERVATION PER HR: HCPCS

## 2023-04-10 PROCEDURE — 36415 COLL VENOUS BLD VENIPUNCTURE: CPT | Performed by: PHYSICIAN ASSISTANT

## 2023-04-10 PROCEDURE — 250N000009 HC RX 250: Performed by: PHYSICIAN ASSISTANT

## 2023-04-10 PROCEDURE — 250N000013 HC RX MED GY IP 250 OP 250 PS 637: Performed by: PHYSICIAN ASSISTANT

## 2023-04-10 PROCEDURE — 250N000012 HC RX MED GY IP 250 OP 636 PS 637: Performed by: PHYSICIAN ASSISTANT

## 2023-04-10 PROCEDURE — 10005 FNA BX W/US GDN 1ST LES: CPT

## 2023-04-10 PROCEDURE — 87070 CULTURE OTHR SPECIMN AEROBIC: CPT | Performed by: PHYSICIAN ASSISTANT

## 2023-04-10 PROCEDURE — 85025 COMPLETE CBC W/AUTO DIFF WBC: CPT | Performed by: PHYSICIAN ASSISTANT

## 2023-04-10 PROCEDURE — 86140 C-REACTIVE PROTEIN: CPT | Performed by: PHYSICIAN ASSISTANT

## 2023-04-10 PROCEDURE — 250N000009 HC RX 250: Performed by: INTERNAL MEDICINE

## 2023-04-10 RX ORDER — OXYCODONE HYDROCHLORIDE 5 MG/1
5-10 TABLET ORAL EVERY 4 HOURS PRN
Qty: 5 TABLET | Refills: 0 | Status: SHIPPED | OUTPATIENT
Start: 2023-04-10 | End: 2023-05-17

## 2023-04-10 RX ORDER — LIDOCAINE 4 G/G
1 PATCH TOPICAL EVERY 24 HOURS
DISCHARGE
Start: 2023-04-10 | End: 2023-06-12

## 2023-04-10 RX ORDER — ONDANSETRON 4 MG/1
4 TABLET, ORALLY DISINTEGRATING ORAL EVERY 6 HOURS PRN
DISCHARGE
Start: 2023-04-10 | End: 2023-06-15

## 2023-04-10 RX ORDER — LIDOCAINE HYDROCHLORIDE 10 MG/ML
1-30 INJECTION, SOLUTION EPIDURAL; INFILTRATION; INTRACAUDAL; PERINEURAL
Status: COMPLETED | OUTPATIENT
Start: 2023-04-10 | End: 2023-04-10

## 2023-04-10 RX ADMIN — OXYCODONE HYDROCHLORIDE 10 MG: 5 TABLET ORAL at 06:46

## 2023-04-10 RX ADMIN — ISOSORBIDE MONONITRATE 30 MG: 30 TABLET, EXTENDED RELEASE ORAL at 08:28

## 2023-04-10 RX ADMIN — LIDOCAINE HYDROCHLORIDE 9 ML: 10 INJECTION, SOLUTION EPIDURAL; INFILTRATION; INTRACAUDAL; PERINEURAL at 12:10

## 2023-04-10 RX ADMIN — FLUOXETINE HYDROCHLORIDE 40 MG: 40 CAPSULE ORAL at 08:28

## 2023-04-10 RX ADMIN — METOPROLOL TARTRATE 12.5 MG: 25 TABLET ORAL at 08:28

## 2023-04-10 RX ADMIN — MYCOPHENOLATE MOFETIL 750 MG: 250 CAPSULE ORAL at 08:27

## 2023-04-10 RX ADMIN — LIDOCAINE PATCH 4% 1 PATCH: 40 PATCH TOPICAL at 08:29

## 2023-04-10 RX ADMIN — OXYCODONE HYDROCHLORIDE 10 MG: 5 TABLET ORAL at 12:42

## 2023-04-10 RX ADMIN — PREDNISONE 5 MG: 5 TABLET ORAL at 08:27

## 2023-04-10 RX ADMIN — ROSUVASTATIN CALCIUM 20 MG: 5 TABLET, FILM COATED ORAL at 08:28

## 2023-04-10 RX ADMIN — PANTOPRAZOLE SODIUM 40 MG: 40 TABLET, DELAYED RELEASE ORAL at 08:27

## 2023-04-10 RX ADMIN — ACETAMINOPHEN 975 MG: 325 TABLET, FILM COATED ORAL at 08:28

## 2023-04-10 RX ADMIN — ASPIRIN 81 MG 81 MG: 81 TABLET ORAL at 08:27

## 2023-04-10 RX ADMIN — ENTECAVIR 0.5 MG: 0.5 TABLET ORAL at 08:28

## 2023-04-10 RX ADMIN — LEVALBUTEROL HYDROCHLORIDE 1.25 MG: 1.25 SOLUTION RESPIRATORY (INHALATION) at 07:57

## 2023-04-10 ASSESSMENT — ACTIVITIES OF DAILY LIVING (ADL)
ADLS_ACUITY_SCORE: 37

## 2023-04-10 NOTE — PLAN OF CARE
"Observation Goals:     -diagnostic tests and consults completed and resulted: not met, arthrocentesis in IR today  -vital signs normal or at patient baseline: met, vss  -adequate pain control on oral analgesics: met, pt reports pain controlled with tylenol and oxycodone PRN  -returns to baseline functional status: in progress, ambulating in hallway SBA w/ fww  -safe disposition plan has been identified: in progress, PT currently recommending TCU placement    /88   Pulse 58   Temp 98.7  F (37.1  C) (Oral)   Resp 16   Ht 1.727 m (5' 8\")   Wt 86.2 kg (190 lb)   SpO2 98%   BMI 28.89 kg/m      "

## 2023-04-10 NOTE — PLAN OF CARE
"   Observation Goals:     -diagnostic tests and consults completed and resulted: not met, arthrocentesis in IR tomorrow  -vital signs normal or at patient baseline: met, vss  -adequate pain control on oral analgesics: met, pt reports pain controlled with tylenol and oxycodone PRN  -returns to baseline functional status: in progress, ambulating in hallway SBA w/ fww  -safe disposition plan has been identified: in progress, PT currently recommending TCU placement    /88   Pulse 58   Temp 98.7  F (37.1  C) (Oral)   Resp 16   Ht 1.727 m (5' 8\")   Wt 86.2 kg (190 lb)   SpO2 98%   BMI 28.89 kg/m      "

## 2023-04-10 NOTE — PROCEDURES
[facility]    Procedure: IR Procedure Note    Date/Time: 4/10/2023 12:29 PM    Performed by: Santana Bañuelos PA-C  Authorized by: Santana Bañuelos PA-C  IR Fellow Physician:  Other(s) attending procedure: Assist: Veronica GARCIA      UNIVERSAL PROTOCOL   Site Marked: NA  Prior Images Obtained and Reviewed:  Yes  Required items: Required blood products, implants, devices and special equipment available    Patient identity confirmed:  Verbally with patient, arm band, provided demographic data and hospital-assigned identification number  Patient was reevaluated immediately before administering moderate or deep sedation or anesthesia  Confirmation Checklist:  Patient's identity using two indicators, relevant allergies, procedure was appropriate and matched the consent or emergent situation and correct equipment/implants were available  Time out: Immediately prior to the procedure a time out was called    Universal Protocol: the Joint Commission Universal Protocol was followed    Preparation: Patient was prepped and draped in usual sterile fashion    ESBL (mL):  0.1     ANESTHESIA    Anesthesia: Local infiltration  Local Anesthetic:  Lidocaine 1% without epinephrine  Anesthetic Total (mL):  7      SEDATION    Patient Sedated: No    See dictated procedure note for full details.  Findings: Fluoroscopy guided aspiration of R hip joint. Approximately 10mL of cloudy serous fluid aspirated.     Specimens: fluid and/or tissue for laboratory analysis    Complications: None    Condition: Stable    Plan: Follow up per primary team.       PROCEDURE    Patient Tolerance:  Patient tolerated the procedure well with no immediate complications  Length of time physician/provider present for 1:1 monitoring during sedation: 0

## 2023-04-10 NOTE — PROGRESS NOTES
Patient's After Visit Summary was reviewed with patient and/or n/a.   Patient verbalized understanding of After Visit Summary, recommended follow up and was given an opportunity to ask questions.   Discharge medications sent home with patient/family: N/A   Discharged with other:n/a

## 2023-04-10 NOTE — PLAN OF CARE
"Observation Goals:    -diagnostic tests and consults completed and resulted: not met, arthrocentesis in IR tomorrow  -vital signs normal or at patient baseline: met, vss  -adequate pain control on oral analgesics: met, pt reports pain controlled with tylenol and oxycodone PRN  -returns to baseline functional status: in progress, ambulating in hallway SBA w/ fww  -safe disposition plan has been identified: in progress, PT currently recommending TCU placement    /84 (BP Location: Left arm)   Pulse 77   Temp 98.7  F (37.1  C) (Oral)   Resp 16   Ht 1.727 m (5' 8\")   Wt 86.2 kg (190 lb)   SpO2 95%   BMI 28.89 kg/m      "

## 2023-04-10 NOTE — DISCHARGE SUMMARY
[facility]  ED Observation Discharge Summary      Date of Admission:  4/8/2023  Date of Discharge:  4/10/2023  Discharging Provider: RONALDO Eastman CNP  Discharge Service: ED Observation    Discharge Diagnoses   Right hip Pain    Follow-ups Needed After Discharge   Follow-up Appointments     Follow Up and recommended labs and tests      Follow up with Nursing home physician.  We recommend a CMP and CRP check    We recommend you continue to follow with [facility] for your ongoing orthopedics   care. You have reported you would like to follow up with them regarding   the right hip aspiration done today. It is important that you follow up   with them regarding today's procedure.             Discharge Disposition   Discharged to rehabilitation facility  Condition at discharge: Stable      Hospital Course     Jerad Ross is a 63 year old male with PMH of CAD s/p CABG, DDKT, HTN, anemia in CKD, chronic hepatitis B, asthma, JULIANE, hx avascular necrosis of b/l hips s/p b/l hip replacements, HLD, GERD, secondary renal hyperparathyroidism who presented to the ED on 4/8/23 with progressive right hip pain.     #Right hip pain  #History of avascular necrosis s/p b/l hip replacements revised in 2001 and 2005  Patient presents with progressive right hip pain and inability to walk. This has been progressing over some time, but worse within the last couple of weeks. He has had b/l hip arthroplasties and revisions on both sides, per chart last revision 2005. He follows with [facility] and there is plan for an upcoming revision. He was due to have a follow up on 4/10 for arthrocentesis to eval for infection and for pre-operative planning. He has been taking Oxycodone 5 mg q8h prn at home without relief, prompting his ED visit. Denies fevers or chills at home. In the ED, he is afebrile. No leukocytosis. Creatinine 0.74 (baseline). CRP 7.96, which is down trending from  10.4 on 4/6/23. Xray right hip shows postoperative changes of b/l total hip arthroplasty. Additional wire fixation bilaterally. There is a fracture of the right cerclage wire about the greater trochanter. No evidence for acute fracture on either side. Calcifications about both hip joints but these have not significantly changed. ED provider d/w orthopedics on call who recommended WBAT and patient to f/u with his outpatient providers as planned. Reassuring that his inflammatory markers are down trending. Concern for septic joint was felt to be low. He is admitted to ED observation for PT evaluation and pain control. PT evaluated patient and are recommending TCU. CRP is up to 19 this morning. Remains afebrile, no leukocytosis. D/w orthopedics given that pt will miss his outpatient appt tomorrow for arthrocentesis. Patient underwent an arthrocentesis with IR today. Per their notes Fluoroscopy guided aspiration of R hip joint. Approximately 10mL of cloudy serous fluid aspirated. Patient tolerated procedure with no complications. Patient reports he plans to follow up with his team at [facility] and will give them the results. Patient is discharged to TCU with a ride set up by social work. At the time of discharge, pain well controlled with Tylenol and oxycodone as well as Lidoderm patch. Tolerating PO and pain well controlled.      #DKKT  Creatinine at baseline, 0.7. Follows with transplant nephrology, last seen 4/6/23.  -Continue PTA mycophenolate mofetil, prednisone     #HTN  #CAD s/p CABG  #HTN  -Continue PTA Aspirin, Imdur, Metoprolol, statin     #Chronic hepatitis B  -Continue PTA Entecavir     #Asthma  -Hold PTA inhalers while on observation status  -Albuterol as needed     #GERD  -Continue PTA PPI    Consultations This Hospital Stay   PHYSICAL THERAPY ADULT IP CONSULT  CARE MANAGEMENT / SOCIAL WORK IP CONSULT  INTERVENTIONAL RADIOLOGY ADULT/PEDS IP CONSULT  PHYSICAL THERAPY ADULT IP CONSULT  OCCUPATIONAL THERAPY ADULT IP  CONSULT    Code Status   Full Code    Time Spent on this Encounter   I, RONALDO Eastman CNP, personally saw the patient today and spent less than or equal to 30 minutes discharging this patient.       RONALDO Eastman CNP  [facility] [address] O[address] [address]  Phone: [phone]  Fax: [phone]  ______________________________________________________________________    Physical Exam   Vital Signs: Temp: 98.7  F (37.1  C) Temp src: Oral BP: 120/86 Pulse: 64   Resp: 18 SpO2: 96 % O2 Device: None (Room air)    Weight: 190 lbs 0 oz    Constitutional: alert, no distress, and cooperative  Head: normocephalic, atraumatic  Neck: no asymmetry, masses, or scars  ENT: throat normal without erythema or exudate  Cardiovascular: RRR  Respiratory: diminished, respirations unlabored  Gastrointestinal: (+) BS, non tender, soft  Musculoskeletal: normal muscle tone, no pitting edema, no redness/warmth over right hip joint, right hip ROM limited due to pain  Skin: no suspicious lesions or rashes  Neurologic: oriented x 3, moves all extremities, no slurred speech  Psychiatric: normal affect and mood       Primary Care Physician   Aston Perry    Discharge Orders      Primary Care - Care Coordination Referral      General info for SNF    Length of Stay Estimate: Short Term Care: Estimated # of Days <30  Condition at Discharge: Stable  Level of care:skilled   Rehabilitation Potential: Good  Admission H&P remains valid and up-to-date: Yes  Recent Chemotherapy: N/A  Use Nursing Home Standing Orders: Yes     Mantoux instructions    Give two-step Mantoux (PPD) Per Facility Policy Yes     Follow Up and recommended labs and tests    Follow up with Nursing home physician.  We recommend a CMP and CRP check    We recommend you continue to follow with [facility] for your ongoing orthopedics care. You have reported you would like to follow up with them  regarding the right hip aspiration done today. It is important that you follow up with them regarding today's procedure.     Intake and output    Every shift     Activity - Up with assistive device     Activity - Up with nursing assistance     Reason for your hospital stay    You were admitted to ED observation for evaluation of right hip pain. In the ED, your vital signs were stable with no leukocytosis. Creatinine was baseline. Your CRp was elevated as it has bleed. Right hip xray shows postoperative changes of bilateral total hip arthroplasty. There is a fracture of the right cerclage wire about the greater trochanter. No evidence of acute fracture on either sides. Ortho was consulted and recommended WBAT. Additionally you underwent and arthrocentesis with IR as you missed your outpatient appointment with O while being admitted. You should share the results from this with your ortho team at [facility]. We recommend you continue taking your prior to admission medications as previously prescribed and take Tylenol, lidocaine patches and oxycodone as needed for right hip pain. We recommend you work with PT and OT at the [facility] facility and follow up with their physician.     Physical Therapy Adult Consult    Evaluate and treat as clinically indicated.    Reason:  Right hip pain. Evaluate and Treat     Occupational Therapy Adult Consult    Evaluate and treat as clinically indicated.    Reason:  Right hip pain. Evaluate and Treat     Fall precautions     Diet    Follow this diet upon discharge: Regular       Significant Results and Procedures   Most Recent 3 CBC's:Recent Labs   Lab Test 04/10/23  0629 04/09/23  0643 04/08/23  1219   WBC 8.0 6.3 7.4   HGB 12.6* 12.5* 13.8   MCV 96 91 92    265 293     Most Recent 3 BMP's:Recent Labs   Lab Test 04/10/23  0629 04/09/23  0643 04/08/23  1219    139 139   POTASSIUM 4.1 4.0 3.8   CHLORIDE 103 104 102   CO2 24 25 24   BUN 16.4 12.4 10.5   CR 0.72 0.77 0.74   ANIONGAP 12  10 13   HEMA 9.4 9.4 9.5   GLC 99 96 91     Most Recent 2 LFT's:Recent Labs   Lab Test 04/09/23  0643 04/08/23  1219   AST 22 25   ALT 14 15   ALKPHOS 57 63   BILITOTAL 0.6 0.6     Most Recent 3 INR's:Recent Labs   Lab Test 07/19/22  1036 04/12/21  1001 06/09/20  0500   INR 1.04 1.03 1.36*     Most Recent INR's and Anticoagulation Dosing History:  Anticoagulation Dose History         Latest Ref Rng & Units 3/13/2018 5/28/2020 6/3/2020 6/8/2020   Recent Dosing and Labs   INR 0.85 - 1.15 0.95   0.98   1.05   1.43      1.66         6/9/2020 4/12/2021 7/19/2022   Recent Dosing and Labs   INR 1.36   1.03   1.04           Multiple values from one day are sorted in reverse-chronological order           Most Recent 3 Creatinines:Recent Labs   Lab Test 04/10/23  0629 04/09/23  0643 04/08/23  1219   CR 0.72 0.77 0.74     Most Recent 3 Hemoglobins:Recent Labs   Lab Test 04/10/23  0629 04/09/23  0643 04/08/23  1219   HGB 12.6* 12.5* 13.8     Most Recent 3 Troponin's:Recent Labs   Lab Test 08/02/21  1301 05/23/15  1032 05/23/15  0031 05/22/15  2051 05/22/15  0923 05/22/15  0918   TROPI  --  0.373* 0.405* 0.350*   < >  --    TROPONIN <0.015  --   --   --   --  0.01    < > = values in this interval not displayed.     Most Recent 3 BNP's:Recent Labs   Lab Test 08/02/21  1301 05/23/15  1032 05/23/15  0031   NTBNPI 309 467 573     Most Recent D-dimer:Recent Labs   Lab Test 05/28/20  1136   DD 0.45     Most Recent Cholesterol Panel:Recent Labs   Lab Test 10/21/22  0908   CHOL 151   LDL 67   HDL 65   TRIG 94     7-Day Micro Results     No results found for the last 168 hours.        Most Recent TSH and T4:Recent Labs   Lab Test 09/02/21  1454   TSH 0.68     Most Recent Hemoglobin A1c:Recent Labs   Lab Test 10/21/22  0908   A1C 6.0*     Most Recent 6 glucoses:Recent Labs   Lab Test 04/10/23  0629 04/09/23  0643 04/08/23  1219 04/06/23  1401 10/21/22  0908 07/19/22  1036   GLC 99 96 91 108* 87 108*     Most Recent Urinalysis:Recent Labs    Lab Test 06/03/20  1307 11/04/18  2128   COLOR Yellow Yellow   APPEARANCE Clear Clear   URINEGLC Negative Negative   URINEBILI Negative Negative   URINEKETONE Negative 10*   SG 1.008 1.020   UBLD Negative Negative   URINEPH 6.5 6.5   PROTEIN Negative 10*   UROBILINOGEN <2.0 E.U./dL  --    NITRITE Negative Negative   LEUKEST Negative Negative   RBCU  --  1   WBCU  --  1   ,   Results for orders placed or performed during the hospital encounter of 04/08/23   XR Hip Right 2-3 Views    Narrative    EXAM: XR HIP RIGHT 2-3 VIEWS  LOCATION: [facility]  DATE/TIME: 04/08/2023, 12:50 PM    INDICATION: Status post hip arthroplasty with worsening, severe pain, inability to bear weight.  COMPARISON: 06/01/2015.      Impression    IMPRESSION: Postoperative changes of bilateral total hip arthroplasty. Additional wire fixation bilaterally. There is a fracture of the right cerclage wire about the greater trochanter. No evidence for acute fracture on either side. Calcifications about   both hip joints but these have not significantly changed.         *Note: Due to a large number of results and/or encounters for the requested time period, some results have not been displayed. A complete set of results can be found in Results Review.       Discharge Medications   Current Discharge Medication List      START taking these medications    Details   Lidocaine (LIDOCARE) 4 % Patch Place 1 patch onto the skin every 24 hours To prevent lidocaine toxicity, patient should be patch free for 12 hrs daily.    Associated Diagnoses: Hip pain, right      ondansetron (ZOFRAN ODT) 4 MG ODT tab Take 1 tablet (4 mg) by mouth every 6 hours as needed for nausea or vomiting    Associated Diagnoses: Hip pain, right      oxyCODONE (ROXICODONE) 5 MG tablet Take 1-2 tablets (5-10 mg) by mouth every 4 hours as needed for moderate pain or severe pain  Qty: 5 tablet, Refills: 0    Associated Diagnoses: Hip pain, right          CONTINUE these medications which have NOT CHANGED    Details   acetaminophen (TYLENOL) 325 MG tablet Take 1-2 tablets by mouth every 6 hours as needed.      albuterol (PROAIR HFA) 108 (90 Base) MCG/ACT inhaler Inhale 2 puffs into the lungs every 6 hours as needed for shortness of breath / dyspnea or wheezing  Qty: 1 g, Refills: 11    Comments: Pharmacy may dispense brand covered by insurance (Proair, or proventil or ventolin or generic albuterol inhaler)  Associated Diagnoses: Wheezing      aspirin 81 MG tablet Take 81 mg by mouth daily       budesonide (PULMICORT FLEXHALER) 90 MCG/ACT inhaler Inhale 2 puffs into the lungs 2 times daily  Qty: 1 each, Refills: 8    Associated Diagnoses: Wheezing      calcium citrate-vitamin D (CITRACAL) 315-200 MG-UNIT TABS Take 1 tablet by mouth daily.      entecavir (BARACLUDE) 0.5 MG tablet Take 1 tablet (0.5 mg) by mouth daily APPT NEEDED FOR FURTHER REFILLS  Qty: 90 tablet, Refills: 3    Associated Diagnoses: Chronic hepatitis B (H); Chronic viral hepatitis B without delta agent and without coma (H)      FLUoxetine (PROZAC) 40 MG capsule Take 1 capsule (40 mg) by mouth daily  Qty: 90 capsule, Refills: 2    Associated Diagnoses: Recurrent major depressive disorder, in partial remission (H)      fluticasone-salmeterol (ADVAIR) 250-50 MCG/ACT inhaler Inhale 1 puff into the lungs every 12 hours  Qty: 180 each, Refills: 3    Associated Diagnoses: Dyspnea on exertion; Wheezing      furosemide (LASIX) 20 MG tablet Take 1 tablet (20 mg) by mouth daily as needed (fluid retention)  Qty: 90 tablet, Refills: 1    Associated Diagnoses: Dyspnea on exertion      isosorbide mononitrate (IMDUR) 30 MG 24 hr tablet TAKE 1 TABLET(30 MG) BY MOUTH DAILY  Qty: 90 tablet, Refills: 3    Associated Diagnoses: Coronary artery disease involving native coronary artery of native heart without angina pectoris; S/P CABG (coronary artery bypass graft)      latanoprost (XALATAN) 0.005 % ophthalmic  solution Place 1 drop into both eyes At Bedtime  Qty: 2.5 mL, Refills: 11    Associated Diagnoses: Borderline glaucoma with ocular hypertension, bilateral      metoprolol tartrate (LOPRESSOR) 25 MG tablet TAKE 1/2 TABLET(12.5 MG) BY MOUTH TWICE DAILY  Qty: 90 tablet, Refills: 1    Associated Diagnoses: Coronary artery disease involving native coronary artery of native heart without angina pectoris      Multiple Vitamins-Iron (ONE DAILY MULTIVITAMIN/IRON) TABS Take 1 tablet by mouth daily      mycophenolate (GENERIC EQUIVALENT) 250 MG capsule Take 3 capsules (750 mg) by mouth 2 times daily  Qty: 540 capsule, Refills: 0    Comments: Appointment and labs needed  Associated Diagnoses: Kidney transplanted      nitroGLYcerin (NITROSTAT) 0.4 MG sublingual tablet Place 1 tablet (0.4 mg) under the tongue every 5 minutes as needed for chest pain  Qty: 20 tablet, Refills: 3    Associated Diagnoses: Coronary arteriosclerosis due to lipid rich plaque      Omega-3 Fatty Acids (OMEGA 3 PO) Take 1 capsule by mouth daily Dose unknown      pantoprazole (PROTONIX) 40 MG EC tablet Take 1 tablet (40 mg) by mouth daily  Qty: 90 tablet, Refills: 3    Associated Diagnoses: Gastroesophageal reflux disease without esophagitis      polyethylene glycol (MIRALAX) 17 g packet Take 17 g by mouth daily as needed       potassium chloride ER (KLOR-CON M) 20 MEQ CR tablet Take 1 tablet (20 mEq) by mouth daily  Qty: 100 tablet, Refills: 1    Associated Diagnoses: Hypokalemia      predniSONE (DELTASONE) 5 MG tablet Take 1 tablet (5 mg) by mouth daily  Qty: 90 tablet, Refills: 0    Comments: Appointment and labs needed  Associated Diagnoses: Kidney transplanted      rosuvastatin (CRESTOR) 20 MG tablet TAKE 1 TABLET(20 MG) BY MOUTH DAILY  Qty: 90 tablet, Refills: 3    Associated Diagnoses: Pure hypercholesterolemia      tacrolimus (PROTOPIC) 0.1 % external ointment Apply topically 2 times daily  Qty: 60 g, Refills: 3    Associated Diagnoses: Intertrigo            Allergies   Allergies   Allergen Reactions     Penicillins Shortness Of Breath and Hives     Keflex [Cephalexin Hcl] Other (See Comments)     Pt could not recall reaction     Tetracycline Other (See Comments)     Patient could not recall reaction     Sulfa Drugs Rash         This patient was discussed with the Care Team in the OBS Unit.  The patient's chart was reviewed and the patient was also seen and evaluated by me.  The plan of care was discussed and reviewed with the Care Team.  The above documentation reflects the evaluation, medical decision making and plan under my supervision.    Jaime Rushing MD, FACEP  [facility] Staff Emergency Physician

## 2023-04-10 NOTE — PROGRESS NOTES
Care Management Discharge Note    Discharge Date: 04/11/2023       Discharge Disposition: Transitional Care    Discharge Services: None    Discharge DME: None    Discharge Transportation: [facility].    Private pay costs discussed: Not applicable    PAS Confirmation Code:  [id number]  Patient/family educated on Medicare website which has current facility and service quality ratings: yes    Education Provided on the Discharge Plan:  No  Persons Notified of Discharge Plans: Yes  Patient/Family in Agreement with the Plan: yes    Handoff Referral Completed: Yes    Referrals Placed by CM/SW: Post Acute Facilities     Referrals have been made to the following facilities and their status is as follows:    [facility]- ACCEPTED  [address] [place]  [address]  P: [phone]  P: [phone] - Admissions  F: [phone]  4/9 - Writer preemptively faxed referral packet for SNF to review.  4/10 - SW spoke with Dasia in admission confirmed they can take pt later this afternoon.     Writer has ceased following the referrals below due to acceptance of above facility.     [facility]    Private pay costs discussed: Not applicable    Additional Information:  Chart reviewed. Case discussed with bedside RN and medical provider. SNF referrals has been submitted. TAMICA to follow up on SNF.     TAMICA called SNF for follow up.     1107  TAMICA received phone call from Dasia at [facility]. TAMICA update Dasia that pt has a procedure to get fluid drain from his right hip this morning then he should be ready for discharge after. Dasia confirm they can take pt today as long as he arrives to facility by 6:00 pm. TAMICA will set up ride and call Dasia back to confirm admit time.     TAMICA met  with pt at bedside and inform him Hiral at [facility] in [place] has accepted pt's referral for rehab. Pt is agreeable to facility. TAMICA will work on setting up ride for pt and submitting all discharge orders to facility as needed.     [facility] (P: [phone])  and set up wheel chair ride for this afternoon. Ride schedule for  between [id number] (2:40-3:25).     TAMICA call Dasia back and confirmed pt will be picked up at 3pm for transport to facility. Dasia ask to have nurse to nurse report call to [phone].     TAMICA passed along nurse to nurse report contact information to pt's bedside nurse, Lorin.     TAMICA complete [id number]. TAMICA faxed over signed discharge orders to Dasia at [facility].      available and will continue to follow for discharge planning and supports as needed.   _______________________    CHIRAG Taylor, LSW  ED/OBS   [facility]  Phone: [phone]  Pager: [phone]  Fax: [phone]     On-call pager, [phone], 4:00 pm to midnight

## 2023-04-10 NOTE — PLAN OF CARE
Physical Therapy Discharge Summary    Reason for therapy discharge:    Discharged to transitional care facility.    Progress towards therapy goal(s). See goals on Care Plan in [facility] electronic health record for goal details.  Goals not met.  Barriers to achieving goals:   discharge from facility.    Therapy recommendation(s):    Continued therapy is recommended.  Rationale/Recommendations:  Pt currently needs Ax1 for mobility including ambulation and stair negotiation. Pt lives alone, has a roomate but they are unable to help with physical mobility/ADLs. Pt has stairs to enter and 14 steps to bedroom/bathroom, no bathroom on main level. Pt limited by LE weakness and R hip pain. Would benefit from TCU to improve strength and independence with mobility/ADLs prior to discharge to home..

## 2023-04-10 NOTE — PROGRESS NOTES
Observation Goals:     -diagnostic tests and consults completed and resulted: Met  -vital signs normal or at patient baseline: Met  -adequate pain control on oral analgesics: Met  -returns to baseline functional status: Not met  -safe disposition plan has been identified: Met

## 2023-04-10 NOTE — IR NOTE
Patient Name: Jerad Ross  Medical Record Number: [id number]  Today's Date: 4/10/2023    Procedure: Right hip aspiration  Proceduralist: Esteban Bañuelos PA-C  Pathology present: N/A    Procedure Start: 12:02  Procedure end: 12:23  Sedation medications administered: local only    Report given to: Patti JACKSON, Observation  : N/A     Other Notes: Pt arrived to IR room 1 from Observation Unit. Consent reviewed. Pt denies any questions or concerns regarding procedure. Pt positioned supine and monitored per protocol. Pt tolerated procedure without any noted complications. Pt transferred back to Observation Unit.    Labs collected and sent as ordered.

## 2023-04-10 NOTE — PROGRESS NOTES
IR follow-up note.    IR will proceed with right hip joint aspiration with imaging 4/10. Dx labs per Rosa Sr PA-C.    Pt does not need to be NPO for this procedure.    Sisi Ortez DNP, APRN  Interventional Radiology   IR on-call pager: [phone]

## 2023-04-10 NOTE — PROGRESS NOTES
[facility]    ED Observation Progress Note - ED Observation  Date of Admission:  4/8/2023    Assessment & Plan     Jerad Ross is a 63 year old male with PMH of CAD s/p CABG, DDKT, HTN, anemia in CKD, chronic hepatitis B, asthma, JULIANE, hx avascular necrosis of b/l hips s/p b/l hip replacements, HLD, GERD, secondary renal hyperparathyroidism who presented to the ED on 4/8/23 with progressive right hip pain.     #Right hip pain  #History of avascular necrosis s/p b/l hip replacements revised in 2001 and 2005  Patient presents with progressive right hip pain and inability to walk. This has been progressing over some time, but worse within the last couple of weeks. He has had b/l hip arthroplasties and revisions on both sides, per chart last revision 2005. He follows with [facility] and there is plan for an upcoming revision. He was due to have a follow up on 4/10 for arthrocentesis to eval for infection and for pre-operative planning. He has been taking Oxycodone 5 mg q8h prn at home without relief, prompting his ED visit. Denies fevers or chills at home. In the ED, he is afebrile. No leukocytosis. Creatinine 0.74 (baseline). CRP 7.96, which is down trending from 10.4 on 4/6/23. Xray right hip shows postoperative changes of b/l total hip arthroplasty. Additional wire fixation bilaterally. There is a fracture of the right cerclage wire about the greater trochanter. No evidence for acute fracture on either side. Calcifications about both hip joints but these have not significantly changed. ED provider d/w orthopedics on call who recommended WBAT and patient to f/u with his outpatient providers as planned. Reassuring that his inflammatory markers are down trending. Concern for septic joint was felt to be low. He is admitted to ED observation for PT evaluation and pain control. PT evaluated patient and are recommending TCU. CRP is up to 19 this morning. Remains  "afebrile, no leukocytosis. D/w orthopedics given that pt will miss his outpatient appt tomorrow for arthrocentesis. No events overnight. Pain is well controlled with oral pain medications. Plan for arthrocentesis with IR this morning. Then plan for discharge to TCU with the help of social work.   -arthrocentesis with IR today  -Acetaminophen 975 mg q8h scheduled  -Oxycodone 5-10 mg q4h prn  -Lidoderm patch  -SW consult for dispo planning  -Follow daily labs and inflammatory markers  -Daily miralax  -PT recommending TCU  -SW consult for dispo planning     #DKKT  Creatinine at baseline, 0.7. Follows with transplant nephrology, last seen 4/6/23.  -Continue PTA mycophenolate mofetil, prednisone     #HTN  #CAD s/p CABG  #HTN  -Continue PTA Aspirin, Imdur, Metoprolol, statin     #Chronic hepatitis B  -Continue PTA Entecavir     #Asthma  -Hold PTA inhalers while on observation status  -Albuterol as needed     #GERD  -Continue PTA PPI       Diet: Regular Diet Adult    DVT Prophylaxis: Ambulate every shift  [place]wood Catheter: Not present  Lines: None     Cardiac Monitoring: None  Code Status: Full Code      Clinically Significant Risk Factors Present on Admission                       # Overweight: Estimated body mass index is 28.89 kg/m  as calculated from the following:    Height as of this encounter: 1.727 m (5' 8\").    Weight as of this encounter: 86.2 kg (190 lb).           Disposition Plan   Discharge to TCU Pending placement     The patient's care was discussed with the Attending Physician, Dr. Rushing, Bedside Nurse, Patient and IR Team.    RONALDO Eastman CNP  ED Observation  [facility]  Securely message with [employer] (more info)  Text page via [facility] Paging/Directory   ______________________________________________________________________    Interval History   No events overnight. Pain is well controlled with oral pain medications. Plan for arthrocentesis with " IR this morning. Then plan for discharge to TCU with the help of social work.     Physical Exam   Vital Signs: Temp: 97.7  F (36.5  C) Temp src: Oral BP: 137/81 Pulse: 50   Resp: 16 SpO2: 99 % O2 Device: None (Room air)    Weight: 190 lbs 0 oz    Constitutional: alert, no distress, and cooperative  Head: normocephalic, atraumatic  Neck: no asymmetry, masses, or scars  ENT: throat normal without erythema or exudate  Cardiovascular: RRR  Respiratory: diminished, respirations unlabored  Gastrointestinal: (+) BS, non tender, soft  Musculoskeletal: normal muscle tone, no pitting edema, no redness/warmth over right hip joint, right hip ROM limited due to pain  Skin: no suspicious lesions or rashes  Neurologic: oriented x 3, moves all extremities, no slurred speech  Psychiatric: normal affect and mood    Medical Decision Making     30 MINUTES SPENT BY ME on the date of service doing chart review, history, exam, documentation & further activities per the note.      Data     I have personally reviewed the following data over the past 24 hrs:    8.0  \   12.6 (L)   / 241     139 103 16.4 /  99   4.1 24 0.72 \       Procal: N/A CRP: 13.70 (H) Lactic Acid: N/A         Imaging results reviewed over the past 24 hrs:   No results found for this or any previous visit (from the past 24 hour(s)).

## 2023-04-11 ENCOUNTER — TRANSITIONAL CARE UNIT VISIT (OUTPATIENT)
Dept: GERIATRICS | Facility: CLINIC | Age: 63
End: 2023-04-11
Payer: COMMERCIAL

## 2023-04-11 VITALS
WEIGHT: 183.4 LBS | BODY MASS INDEX: 27.8 KG/M2 | TEMPERATURE: 97.9 F | SYSTOLIC BLOOD PRESSURE: 136 MMHG | DIASTOLIC BLOOD PRESSURE: 78 MMHG | HEART RATE: 64 BPM | HEIGHT: 68 IN | OXYGEN SATURATION: 93 %

## 2023-04-11 DIAGNOSIS — M62.81 GENERALIZED MUSCLE WEAKNESS: ICD-10-CM

## 2023-04-11 DIAGNOSIS — Z94.0 KIDNEY REPLACED BY TRANSPLANT: ICD-10-CM

## 2023-04-11 DIAGNOSIS — M25.551 HIP PAIN, RIGHT: ICD-10-CM

## 2023-04-11 DIAGNOSIS — I10 HYPERTENSION, UNSPECIFIED TYPE: ICD-10-CM

## 2023-04-11 DIAGNOSIS — Z71.89 ACP (ADVANCE CARE PLANNING): ICD-10-CM

## 2023-04-11 DIAGNOSIS — M87.051 AVASCULAR NECROSIS OF BONES OF BOTH HIPS (H): Primary | ICD-10-CM

## 2023-04-11 DIAGNOSIS — N18.6 END STAGE RENAL DISEASE (H): ICD-10-CM

## 2023-04-11 DIAGNOSIS — M87.052 AVASCULAR NECROSIS OF BONES OF BOTH HIPS (H): Primary | ICD-10-CM

## 2023-04-11 PROCEDURE — 99497 ADVNCD CARE PLAN 30 MIN: CPT | Performed by: NURSE PRACTITIONER

## 2023-04-11 PROCEDURE — 99309 SBSQ NF CARE MODERATE MDM 30: CPT | Mod: 25 | Performed by: NURSE PRACTITIONER

## 2023-04-11 NOTE — PROGRESS NOTES
[facility] GERIATRICS    PRIMARY CARE PROVIDER AND CLINIC:  Aston Perry MD, [address] [facility] [id number]  Chief Complaint   Patient presents with     [facility] Medical Record Number:  [id number]  Place of Service where encounter took place:  [facility] [[id number]]    Jerad Ross  is a 63 year old  (1960), admitted to the above facility from  [facility]. Hospital stay 04/08/2023 through 04/10/2023..   HPI:    Jerad Ross is a 63 year old male with PMH of CAD s/p CABG, DDKT, HTN, anemia in CKD, chronic hepatitis B, asthma, JULIANE, hx avascular necrosis of b/l hips s/p b/l hip replacements and revisions, presented to the ED on 4/8/23 with progressive right hip pain and inability to walk, progressing for weeks, last hip revision 2005, also had arthrocentesis on 4/10 to eval for infection and for pre-operative planning with 10mL cloudy serous fluid removed, concern for septic joint was felt to be low, afebrile, no leukocytosis, continue current pain regimen, of note follows transplant nephrology (first kidney at age 18), last seen 4/6/23, continue PTA mycophenolate mofetil, prednisone, transfer to TCU.    Patient seen today, appears healthy, good historian, pleasant, still R hip pain + and having difficulty ambulating without assist, labs in hospital on 4/10 all WNL, VS WNL, motivated to rehab and return home, apprehensive about another round of hip revisions.     Advance Care Planning Goals of Care Discussion 4/11/2023  Goals of care discussed with Jerad Ross on 4/11. Present at discussion: patient. Questions discussed and patient response:  What is your understanding now of where you are with your illness?: everything. How much information about what is likely to be ahead with your illness would you like to have?: all. As the clinician I communicated the following to the patient regarding their  prognosis: good. If your health situation worsens, what are your most important goals?: be happy. What are your biggest fears and worries about the future with your health?: hip pain. Unacceptable function : unsure. What abilities are so critical to your life that you cannot imagine living without them?: being independent. Pt does NOT want to: die. If you become sicker, how much are you willing to go through for the possibility of gaining more time?: maybe. Would this change if these were permanent states, if they did not get better?: maybe. How much does your agent and/or family know about your priorities and wishes?: some of it. Added by RONALDO Izaguirre CNP        CODE STATUS/ADVANCE DIRECTIVES DISCUSSION:  Prior  CPR/Full code   ALLERGIES:   Allergies   Allergen Reactions     Penicillins Shortness Of Breath and Hives     Keflex [Cephalexin Hcl] Other (See Comments)     Pt could not recall reaction     Tetracycline Other (See Comments)     Patient could not recall reaction     Sulfa Drugs Rash      PAST MEDICAL HISTORY:   Past Medical History:   Diagnosis Date     Acute midline low back pain without sciatica      AION (acute ischemic optic neuropathy)      AION (acute ischemic optic neuropathy)      Anemia in chronic renal disease      Anemia in chronic renal disease      Avascular necrosis of bones of both hips (H)     s/p bilateral hip replacements     Avascular necrosis of bones of both hips (H)     s/p bilateral hip replacements     Basal cell carcinoma      Basal cell carcinoma      Chronic hepatitis B (H)      Chronic hepatitis B (H)      Clostridium difficile colitis      Clostridium difficile colitis      Coronary artery disease involving native coronary artery of native heart without angina pectoris 6/17/2014    Coronary angiogram 6/17/14: Severe distal 3-vessel disease involving small vessels, not amenable to PCI or CABG.     Coronary artery disease involving native coronary artery of  native heart without angina pectoris 06/17/2014    Coronary angiogram 6/17/14: Severe distal 3-vessel disease involving small vessels, not amenable to PCI or CABG     CRP elevated      Depression      Depression      Diverticulosis      Diverticulosis      Dyslipidemia      Dyslipidemia      Elevated C-reactive protein (CRP)      FSGS (focal segmental glomerulosclerosis)      FSGS (focal segmental glomerulosclerosis)      Gastric ulcer with hemorrhage 2/12/12     Gastric ulcer with hemorrhage 02/12/2012     GERD (gastroesophageal reflux disease)      GERD (gastroesophageal reflux disease)      Glaucoma     OHTN     Glaucoma      Hemorrhoids      Hemorrhoids      HTN (hypertension)      Hyperlipidemia      Hypertension secondary to other renal disorders      Hypogonadism in male      Hypogonadism in male      Immunosuppressed status (H)      Kidney replaced by transplant     focal glomerulosclerosis      Kidney transplanted     focal glomerulosclerosis      NSTEMI (non-ST elevated myocardial infarction) (H) 6/17/2014     NSTEMI (non-ST elevated myocardial infarction) (H) 06/17/2014     JULIANE (obstructive sleep apnea)     Doesn't use CPAP     JULIANE (obstructive sleep apnea)      Paracentral scotoma     LE     Paracentral scotoma     LE     Secondary renal hyperparathyroidism (H)      Secondary renal hyperparathyroidism (H)      Septic bursitis      Squamous cell carcinoma      Squamous cell carcinoma       PAST SURGICAL HISTORY:   has a past surgical history that includes Extracapsular cataract extration with intraocular lens implant (4-20-10, 6-1-10); hernia repair (1995); hip surgery; knee surgery (1983, 1987); Colectomy subtotal (1983); Kidney surgery (1978 and 1993); Splenectomy (1978); Colonoscopy (2/13/2012); Esophagoscopy, gastroscopy, duodenoscopy (EGD), combined (2/13/2012); cataract iol, rt/lt (4/19/2000); cataract iol, rt/lt (6/1/2000); Mohs micrographic procedure; Tonsillectomy; appendectomy; Colonoscopy (N/A,  1/22/2020); Cardiac Bypass surgery (06/08/2020); Cataract Extraction Extracapsular W/ Intraocular Lens Implantation (Bilateral, 4-20-10, 6-1-10); hernia repair (1995); hip surgery (1981); knee surgery (Bilateral, 1983, 1987); Colectomy Partial (1983); Kidney surgery (1978, 1993); Splenectomy (1978); Colonoscopy (02/13/2012); Esophagoscopy, gastroscopy, duodenoscopy (EGD), combined (02/13/2012); Phacoemulsification clear cornea with standard intraocular lens implant (Right, 04/19/2000); Phacoemulsification clear cornea with standard intraocular lens implant (Left, 06/01/2000); Mohs micrographic procedure; Tonsillectomy; appendectomy; other surgical history; Cv Coronary Angiogram (N/A, 6/2/2020); Cv Left Heart Catheterization Without Left Ventriculogram (Left, 6/2/2020); Coronary Angiogram (N/A, 9/20/2021); Supravalvular Aortagram (N/A, 9/20/2021); and IR Fine Needle Aspiration w Ultrasound (4/10/2023).  FAMILY HISTORY: family history includes Cancer in his paternal grandmother; Cardiovascular in his father; Cerebrovascular Disease in his maternal grandfather and paternal grandfather; Hypertension in his father; Kidney Disease in his father, paternal aunt, and paternal aunt; Other - See Comments in his father and maternal grandfather; Ovarian Cancer in his paternal grandmother.  SOCIAL HISTORY:   reports that he quit smoking about 35 years ago. His smoking use included cigarettes. He has a 9.00 pack-year smoking history. He has quit using smokeless tobacco. He reports current alcohol use. He reports that he does not use drugs.  Patient's living condition: lives alone    Post Discharge Medication Reconciliation Status:   MED REC REQUIRED  Post Medication Reconciliation Status: discharge medications reconciled and changed, per note/orders       Current Outpatient Medications   Medication Sig     acetaminophen (TYLENOL) 325 MG tablet Take 1-2 tablets by mouth every 6 hours as needed.     albuterol (PROAIR HFA) 108 (90  Base) MCG/ACT inhaler Inhale 2 puffs into the lungs every 6 hours as needed for shortness of breath / dyspnea or wheezing     aspirin 81 MG tablet Take 81 mg by mouth daily      calcium citrate-vitamin D (CITRACAL) 315-200 MG-UNIT TABS Take 1 tablet by mouth daily.     entecavir (BARACLUDE) 0.5 MG tablet Take 1 tablet (0.5 mg) by mouth daily APPT NEEDED FOR FURTHER REFILLS     FLUoxetine (PROZAC) 40 MG capsule Take 1 capsule (40 mg) by mouth daily     fluticasone-salmeterol (ADVAIR) 250-50 MCG/ACT inhaler Inhale 1 puff into the lungs every 12 hours     furosemide (LASIX) 20 MG tablet Take 1 tablet (20 mg) by mouth daily as needed (fluid retention)     isosorbide mononitrate (IMDUR) 30 MG 24 hr tablet TAKE 1 TABLET(30 MG) BY MOUTH DAILY     latanoprost (XALATAN) 0.005 % ophthalmic solution Place 1 drop into both eyes At Bedtime     Lidocaine (LIDOCARE) 4 % Patch Place 1 patch onto the skin every 24 hours To prevent lidocaine toxicity, patient should be patch free for 12 hrs daily.     metoprolol tartrate (LOPRESSOR) 25 MG tablet TAKE 1/2 TABLET(12.5 MG) BY MOUTH TWICE DAILY     Multiple Vitamins-Iron (ONE DAILY MULTIVITAMIN/IRON) TABS Take 1 tablet by mouth daily     mycophenolate (GENERIC EQUIVALENT) 250 MG capsule Take 3 capsules (750 mg) by mouth 2 times daily     nitroGLYcerin (NITROSTAT) 0.4 MG sublingual tablet Place 1 tablet (0.4 mg) under the tongue every 5 minutes as needed for chest pain     Omega-3 Fatty Acids (OMEGA 3 PO) Take 1 capsule by mouth daily Dose unknown     ondansetron (ZOFRAN ODT) 4 MG ODT tab Take 1 tablet (4 mg) by mouth every 6 hours as needed for nausea or vomiting     oxyCODONE (ROXICODONE) 5 MG tablet Take 1-2 tablets (5-10 mg) by mouth every 4 hours as needed for moderate pain or severe pain     pantoprazole (PROTONIX) 40 MG EC tablet Take 1 tablet (40 mg) by mouth daily     polyethylene glycol (MIRALAX) 17 g packet Take 17 g by mouth daily as needed      potassium chloride ER (KLOR-CON  "M) 20 MEQ CR tablet Take 1 tablet (20 mEq) by mouth daily     rosuvastatin (CRESTOR) 20 MG tablet TAKE 1 TABLET(20 MG) BY MOUTH DAILY (Patient taking differently: Take 20 mg by mouth At Bedtime)     tacrolimus (PROTOPIC) 0.1 % external ointment Apply topically 2 times daily     budesonide (PULMICORT FLEXHALER) 90 MCG/ACT inhaler Inhale 2 puffs into the lungs 2 times daily     predniSONE (DELTASONE) 5 MG tablet Take 1 tablet (5 mg) by mouth daily     No current facility-administered medications for this visit.       ROS:  4 point ROS including Respiratory, CV, GI and , other than that noted in the HPI,  is negative    Vitals:  /78   Pulse 64   Temp 97.9  F (36.6  C)   Ht 1.727 m (5' 8\")   Wt 83.2 kg (183 lb 6.4 oz)   SpO2 93%   BMI 27.89 kg/m    Exam:  GENERAL APPEARANCE:  in no distress, appears healthy  ENT:  Mouth and posterior oropharynx normal, moist mucous membranes  RESP:  lungs clear to auscultation , no respiratory distress  CV:  regular rate and rhythm, no murmur, rub, or gallop, no edema  ABDOMEN:  no guarding or rebound, bowel sounds normal  M/S:   Gait and station abnormal unsteady gait  SKIN:  Inspection of skin and subcutaneous tissue baseline  NEURO:   Examination of sensation by touch normal  PSYCH:  affect and mood normal    Lab/Diagnostic data:  Recent labs in [facility] reviewed by me today.     ASSESSMENT/PLAN:    (M87.051,  M87.052) Avascular necrosis of bones of both hips (H)  (primary encounter diagnosis)  (M25.551) Hip pain, right  (M62.81) Generalized muscle weakness  Comment: bilateral hips then revisions, R hip pain  Plan:   -follow up [facility] for another revision PRN  -change APAP to QID  -PT/OT eval and treat  -continue lidocaine, oxycodone, miralax  -place parameters on oxy 5 or 10mg    (N18.6) End stage renal disease (H)  (Z94.0) Kidney replaced by transplant  Comment: Hx of transplants starting age 18, GFR on 4/10 was >90  Plan: continue prednisone, micophenolate  -follow up " nephrology PRN    (I10) Hypertension, unspecified type  Comment: SBP's in the 130 range  Plan: continue lasix, metoprolol, K supplement  -staff to check VS daily    (Z71.89) ACP (advance care planning)  Comment: code status confirmed full code  Plan: ME ADVANCE CARE PLANNING FIRST 30 MINS          I spent 16 minutes F2F with patient in addition to todays visit completing a POLST form.        Electronically signed by:  RONALDO Izaguirre CNP

## 2023-04-11 NOTE — LETTER
4/11/2023        RE: Jerad Ross  [address] [place] [address]        [facility] GERIATRICS    PRIMARY CARE PROVIDER AND CLINIC:  Aston Perry MD, [address] [facility] [address]  Chief Complaint   Patient presents with     [facility] Medical Record Number:  [id number]  Place of Service where encounter took place:  [facility]  AT [facility] ([facility]) [[id number]]    Jerad Ross  is a 63 year old  (1960), admitted to the above facility from  [facility]. Hospital stay 04/08/2023 through 04/10/2023..   HPI:    Jerad Ross is a 63 year old male with PMH of CAD s/p CABG, DDKT, HTN, anemia in CKD, chronic hepatitis B, asthma, JULIANE, hx avascular necrosis of b/l hips s/p b/l hip replacements and revisions, presented to the ED on 4/8/23 with progressive right hip pain and inability to walk, progressing for weeks, last hip revision 2005, also had arthrocentesis on 4/10 to eval for infection and for pre-operative planning with 10mL cloudy serous fluid removed, concern for septic joint was felt to be low, afebrile, no leukocytosis, continue current pain regimen, of note follows transplant nephrology (first kidney at age 18), last seen 4/6/23, continue PTA mycophenolate mofetil, prednisone, transfer to TCU.    Patient seen today, appears healthy, good historian, pleasant, still R hip pain + and having difficulty ambulating without assist, labs in hospital on 4/10 all WNL, VS WNL, motivated to rehab and return home, apprehensive about another round of hip revisions.     Advance Care Planning Goals of Care Discussion 4/11/2023  Goals of care discussed with Jerad Ross on 4/11. Present at discussion: patient. Questions discussed and patient response:  What is your understanding now of where you are with your illness?: everything. How much information about what is likely to be ahead with your illness would you like to have?:  all. As the clinician I communicated the following to the patient regarding their prognosis: good. If your health situation worsens, what are your most important goals?: be happy. What are your biggest fears and worries about the future with your health?: hip pain. Unacceptable function : unsure. What abilities are so critical to your life that you cannot imagine living without them?: being independent. Pt does NOT want to: die. If you become sicker, how much are you willing to go through for the possibility of gaining more time?: maybe. Would this change if these were permanent states, if they did not get better?: maybe. How much does your agent and/or family know about your priorities and wishes?: some of it. Added by RONALDO Izaguirre CNP        CODE STATUS/ADVANCE DIRECTIVES DISCUSSION:  Prior  CPR/Full code   ALLERGIES:   Allergies   Allergen Reactions     Penicillins Shortness Of Breath and Hives     Keflex [Cephalexin Hcl] Other (See Comments)     Pt could not recall reaction     Tetracycline Other (See Comments)     Patient could not recall reaction     Sulfa Drugs Rash      PAST MEDICAL HISTORY:   Past Medical History:   Diagnosis Date     Acute midline low back pain without sciatica      AION (acute ischemic optic neuropathy)      AION (acute ischemic optic neuropathy)      Anemia in chronic renal disease      Anemia in chronic renal disease      Avascular necrosis of bones of both hips (H)     s/p bilateral hip replacements     Avascular necrosis of bones of both hips (H)     s/p bilateral hip replacements     Basal cell carcinoma      Basal cell carcinoma      Chronic hepatitis B (H)      Chronic hepatitis B (H)      Clostridium difficile colitis      Clostridium difficile colitis      Coronary artery disease involving native coronary artery of native heart without angina pectoris 6/17/2014    Coronary angiogram 6/17/14: Severe distal 3-vessel disease involving small vessels, not amenable to  PCI or CABG.     Coronary artery disease involving native coronary artery of native heart without angina pectoris 06/17/2014    Coronary angiogram 6/17/14: Severe distal 3-vessel disease involving small vessels, not amenable to PCI or CABG     CRP elevated      Depression      Depression      Diverticulosis      Diverticulosis      Dyslipidemia      Dyslipidemia      Elevated C-reactive protein (CRP)      FSGS (focal segmental glomerulosclerosis)      FSGS (focal segmental glomerulosclerosis)      Gastric ulcer with hemorrhage 2/12/12     Gastric ulcer with hemorrhage 02/12/2012     GERD (gastroesophageal reflux disease)      GERD (gastroesophageal reflux disease)      Glaucoma     OHTN     Glaucoma      Hemorrhoids      Hemorrhoids      HTN (hypertension)      Hyperlipidemia      Hypertension secondary to other renal disorders      Hypogonadism in male      Hypogonadism in male      Immunosuppressed status (H)      Kidney replaced by transplant     focal glomerulosclerosis      Kidney transplanted     focal glomerulosclerosis      NSTEMI (non-ST elevated myocardial infarction) (H) 6/17/2014     NSTEMI (non-ST elevated myocardial infarction) (H) 06/17/2014     JULIANE (obstructive sleep apnea)     Doesn't use CPAP     JULIANE (obstructive sleep apnea)      Paracentral scotoma     LE     Paracentral scotoma     LE     Secondary renal hyperparathyroidism (H)      Secondary renal hyperparathyroidism (H)      Septic bursitis      Squamous cell carcinoma      Squamous cell carcinoma       PAST SURGICAL HISTORY:   has a past surgical history that includes Extracapsular cataract extration with intraocular lens implant (4-20-10, 6-1-10); hernia repair (1995); hip surgery; knee surgery (1983, 1987); Colectomy subtotal (1983); Kidney surgery (1978 and 1993); Splenectomy (1978); Colonoscopy (2/13/2012); Esophagoscopy, gastroscopy, duodenoscopy (EGD), combined (2/13/2012); cataract iol, rt/lt (4/19/2000); cataract iol, rt/lt  (6/1/2000); Mohs micrographic procedure; Tonsillectomy; appendectomy; Colonoscopy (N/A, 1/22/2020); Cardiac Bypass surgery (06/08/2020); Cataract Extraction Extracapsular W/ Intraocular Lens Implantation (Bilateral, 4-20-10, 6-1-10); hernia repair (1995); hip surgery (1981); knee surgery (Bilateral, 1983, 1987); Colectomy Partial (1983); Kidney surgery (1978, 1993); Splenectomy (1978); Colonoscopy (02/13/2012); Esophagoscopy, gastroscopy, duodenoscopy (EGD), combined (02/13/2012); Phacoemulsification clear cornea with standard intraocular lens implant (Right, 04/19/2000); Phacoemulsification clear cornea with standard intraocular lens implant (Left, 06/01/2000); Mohs micrographic procedure; Tonsillectomy; appendectomy; other surgical history; Cv Coronary Angiogram (N/A, 6/2/2020); Cv Left Heart Catheterization Without Left Ventriculogram (Left, 6/2/2020); Coronary Angiogram (N/A, 9/20/2021); Supravalvular Aortagram (N/A, 9/20/2021); and IR Fine Needle Aspiration w Ultrasound (4/10/2023).  FAMILY HISTORY: family history includes Cancer in his paternal grandmother; Cardiovascular in his father; Cerebrovascular Disease in his maternal grandfather and paternal grandfather; Hypertension in his father; Kidney Disease in his father, paternal aunt, and paternal aunt; Other - See Comments in his father and maternal grandfather; Ovarian Cancer in his paternal grandmother.  SOCIAL HISTORY:   reports that he quit smoking about 35 years ago. His smoking use included cigarettes. He has a 9.00 pack-year smoking history. He has quit using smokeless tobacco. He reports current alcohol use. He reports that he does not use drugs.  Patient's living condition: lives alone    Post Discharge Medication Reconciliation Status:   MED REC REQUIRED  Post Medication Reconciliation Status: discharge medications reconciled and changed, per note/orders       Current Outpatient Medications   Medication Sig     acetaminophen (TYLENOL) 325 MG tablet  Take 1-2 tablets by mouth every 6 hours as needed.     albuterol (PROAIR HFA) 108 (90 Base) MCG/ACT inhaler Inhale 2 puffs into the lungs every 6 hours as needed for shortness of breath / dyspnea or wheezing     aspirin 81 MG tablet Take 81 mg by mouth daily      calcium citrate-vitamin D (CITRACAL) 315-200 MG-UNIT TABS Take 1 tablet by mouth daily.     entecavir (BARACLUDE) 0.5 MG tablet Take 1 tablet (0.5 mg) by mouth daily APPT NEEDED FOR FURTHER REFILLS     FLUoxetine (PROZAC) 40 MG capsule Take 1 capsule (40 mg) by mouth daily     fluticasone-salmeterol (ADVAIR) 250-50 MCG/ACT inhaler Inhale 1 puff into the lungs every 12 hours     furosemide (LASIX) 20 MG tablet Take 1 tablet (20 mg) by mouth daily as needed (fluid retention)     isosorbide mononitrate (IMDUR) 30 MG 24 hr tablet TAKE 1 TABLET(30 MG) BY MOUTH DAILY     latanoprost (XALATAN) 0.005 % ophthalmic solution Place 1 drop into both eyes At Bedtime     Lidocaine (LIDOCARE) 4 % Patch Place 1 patch onto the skin every 24 hours To prevent lidocaine toxicity, patient should be patch free for 12 hrs daily.     metoprolol tartrate (LOPRESSOR) 25 MG tablet TAKE 1/2 TABLET(12.5 MG) BY MOUTH TWICE DAILY     Multiple Vitamins-Iron (ONE DAILY MULTIVITAMIN/IRON) TABS Take 1 tablet by mouth daily     mycophenolate (GENERIC EQUIVALENT) 250 MG capsule Take 3 capsules (750 mg) by mouth 2 times daily     nitroGLYcerin (NITROSTAT) 0.4 MG sublingual tablet Place 1 tablet (0.4 mg) under the tongue every 5 minutes as needed for chest pain     Omega-3 Fatty Acids (OMEGA 3 PO) Take 1 capsule by mouth daily Dose unknown     ondansetron (ZOFRAN ODT) 4 MG ODT tab Take 1 tablet (4 mg) by mouth every 6 hours as needed for nausea or vomiting     oxyCODONE (ROXICODONE) 5 MG tablet Take 1-2 tablets (5-10 mg) by mouth every 4 hours as needed for moderate pain or severe pain     pantoprazole (PROTONIX) 40 MG EC tablet Take 1 tablet (40 mg) by mouth daily     polyethylene glycol  "(MIRALAX) 17 g packet Take 17 g by mouth daily as needed      potassium chloride ER (KLOR-CON M) 20 MEQ CR tablet Take 1 tablet (20 mEq) by mouth daily     rosuvastatin (CRESTOR) 20 MG tablet TAKE 1 TABLET(20 MG) BY MOUTH DAILY (Patient taking differently: Take 20 mg by mouth At Bedtime)     tacrolimus (PROTOPIC) 0.1 % external ointment Apply topically 2 times daily     budesonide (PULMICORT FLEXHALER) 90 MCG/ACT inhaler Inhale 2 puffs into the lungs 2 times daily     predniSONE (DELTASONE) 5 MG tablet Take 1 tablet (5 mg) by mouth daily     No current facility-administered medications for this visit.       ROS:  4 point ROS including Respiratory, CV, GI and , other than that noted in the HPI,  is negative    Vitals:  /78   Pulse 64   Temp 97.9  F (36.6  C)   Ht 1.727 m (5' 8\")   Wt 83.2 kg (183 lb 6.4 oz)   SpO2 93%   BMI 27.89 kg/m    Exam:  GENERAL APPEARANCE:  in no distress, appears healthy  ENT:  Mouth and posterior oropharynx normal, moist mucous membranes  RESP:  lungs clear to auscultation , no respiratory distress  CV:  regular rate and rhythm, no murmur, rub, or gallop, no edema  ABDOMEN:  no guarding or rebound, bowel sounds normal  M/S:   Gait and station abnormal unsteady gait  SKIN:  Inspection of skin and subcutaneous tissue baseline  NEURO:   Examination of sensation by touch normal  PSYCH:  affect and mood normal    Lab/Diagnostic data:  Recent labs in [facility] reviewed by me today.     ASSESSMENT/PLAN:    (M87.051,  M87.052) Avascular necrosis of bones of both hips (H)  (primary encounter diagnosis)  (M25.551) Hip pain, right  (M62.81) Generalized muscle weakness  Comment: bilateral hips then revisions, R hip pain  Plan:   -follow up [facility] for another revision PRN  -change APAP to QID  -PT/OT eval and treat  -continue lidocaine, oxycodone, miralax  -place parameters on oxy 5 or 10mg    (N18.6) End stage renal disease (H)  (Z94.0) Kidney replaced by transplant  Comment: Hx of transplants " starting age 18, GFR on 4/10 was >90  Plan: continue prednisone, micophenolate  -follow up nephrology PRN    (I10) Hypertension, unspecified type  Comment: SBP's in the 130 range  Plan: continue lasix, metoprolol, K supplement  -staff to check VS daily    (Z71.89) ACP (advance care planning)  Comment: code status confirmed full code  Plan: ID ADVANCE CARE PLANNING FIRST 30 MINS          I spent 16 minutes F2F with patient in addition to todays visit completing a POLST form.        Electronically signed by:  RONALDO Izaguirre CNP                     Sincerely,        RONALDO Izaguirre CNP

## 2023-04-14 ENCOUNTER — TRANSITIONAL CARE UNIT VISIT (OUTPATIENT)
Dept: GERIATRICS | Facility: CLINIC | Age: 63
End: 2023-04-14
Payer: COMMERCIAL

## 2023-04-14 VITALS
BODY MASS INDEX: 27.8 KG/M2 | DIASTOLIC BLOOD PRESSURE: 88 MMHG | HEART RATE: 53 BPM | HEIGHT: 68 IN | TEMPERATURE: 97.8 F | WEIGHT: 183.4 LBS | SYSTOLIC BLOOD PRESSURE: 142 MMHG | OXYGEN SATURATION: 93 % | RESPIRATION RATE: 16 BRPM

## 2023-04-14 DIAGNOSIS — K59.01 SLOW TRANSIT CONSTIPATION: ICD-10-CM

## 2023-04-14 DIAGNOSIS — R06.2 WHEEZING: ICD-10-CM

## 2023-04-14 DIAGNOSIS — R06.09 DYSPNEA ON EXERTION: ICD-10-CM

## 2023-04-14 DIAGNOSIS — M25.551 HIP PAIN, RIGHT: Primary | ICD-10-CM

## 2023-04-14 PROCEDURE — 99309 SBSQ NF CARE MODERATE MDM 30: CPT | Performed by: NURSE PRACTITIONER

## 2023-04-14 RX ORDER — AMOXICILLIN 250 MG
2 CAPSULE ORAL 2 TIMES DAILY PRN
Qty: 1 TABLET | Refills: 0
Start: 2023-04-14 | End: 2023-05-17

## 2023-04-14 RX ORDER — FLUTICASONE PROPIONATE AND SALMETEROL 250; 50 UG/1; UG/1
1 POWDER RESPIRATORY (INHALATION) 2 TIMES DAILY PRN
Qty: 180 EACH | Refills: 3 | Status: ON HOLD
Start: 2023-04-14 | End: 2023-09-01

## 2023-04-14 NOTE — LETTER
"    4/14/2023        RE: Jerad Ross  [address] [place] [address]        [facility] GERIATRICS    Chief Complaint   Patient presents with     RECHECK     HPI:  Jerad Ross is a 63 year old  (1960), who is being seen today for an episodic care visit at: [facility] AT [facility] ([facility]) [[id number]]. Today's concern is:   1. Hip pain, right    2. Dyspnea on exertion    3. Wheezing    4. Slow transit constipation      Patient seen for follow up, in hospital with R hip pain, 10mL fluid aspirated and now being cultured with no growth day 3-4, taking oxycodone intermittently and now having firmer stool, reviewed bowel regimen with patient today, also Hx JULIANE and CAD, lungs are clear, sats WNL on RA, requesting pulmonary meds be changed to PRN only, overall appears healthy, no distress.    Allergies, and PMH/PSH reviewed in [facility] today.  REVIEW OF SYSTEMS:  4 point ROS including Respiratory, CV, GI and , other than that noted in the HPI,  is negative    Objective:   BP (!) 142/88   Pulse 53   Temp 97.8  F (36.6  C)   Resp 16   Ht 1.727 m (5' 8\")   Wt 83.2 kg (183 lb 6.4 oz)   SpO2 93%   BMI 27.89 kg/m    GENERAL APPEARANCE:  in no distress, appears healthy  ENT:  Mouth and posterior oropharynx normal, moist mucous membranes  RESP:  lungs clear to auscultation   CV:  no edema, rate-normal  ABDOMEN:  bowel sounds normal  M/S:   Gait and station abnormal unsteady gait  SKIN:  Inspection of skin and subcutaneous tissue baseline  NEURO:   Examination of sensation by touch normal  PSYCH:  affect and mood normal    Labs done in SNF are in [facility]. Please refer to them using [facility]/Care Everywhere.    Assessment/Plan:  (M25.571) Hip pain, right  (primary encounter diagnosis)  Comment: R hip aspiration completed, no growth x3-4 days, pain improving  Plan:   -PT/OT working with  -lab to continue checking for aspirate growth  -continue lidocaine, oxycodone, APAP for pain  -CBC, CMP, CRP, ESR on " 4/17    (R06.09) Dyspnea on exertion  (R06.2) Wheezing  Comment: lungs clear, mild JULIANE, on RA  Plan: change advair and pulmicort to BID PRN    (K59.01) Slow transit constipation  Comment: mild firming up of stool with oxy use  Plan: start senna 2 tabs BID PRN  -patient instructed to track bowels, ask nurse if having firmer stool    MED REC REQUIRED  Post Medication Reconciliation Status: medication reconcilation previously completed during another office visit        Electronically signed by: RONALDO Izaguirre CNP           Sincerely,        RONALOD Izaguirre CNP

## 2023-04-14 NOTE — PROGRESS NOTES
"[facility] GERIATRICS    Chief Complaint   Patient presents with     RECHECK     HPI:  Jerad Ross is a 63 year old  (1960), who is being seen today for an episodic care visit at: [facility] AT [facility] ([facility]) [[id number]]. Today's concern is:   1. Hip pain, right    2. Dyspnea on exertion    3. Wheezing    4. Slow transit constipation      Patient seen for follow up, in hospital with R hip pain, 10mL fluid aspirated and now being cultured with no growth day 3-4, taking oxycodone intermittently and now having firmer stool, reviewed bowel regimen with patient today, also Hx JULIANE and CAD, lungs are clear, sats WNL on RA, requesting pulmonary meds be changed to PRN only, overall appears healthy, no distress.    Allergies, and PMH/PSH reviewed in [facility] today.  REVIEW OF SYSTEMS:  4 point ROS including Respiratory, CV, GI and , other than that noted in the HPI,  is negative    Objective:   BP (!) 142/88   Pulse 53   Temp 97.8  F (36.6  C)   Resp 16   Ht 1.727 m (5' 8\")   Wt 83.2 kg (183 lb 6.4 oz)   SpO2 93%   BMI 27.89 kg/m    GENERAL APPEARANCE:  in no distress, appears healthy  ENT:  Mouth and posterior oropharynx normal, moist mucous membranes  RESP:  lungs clear to auscultation   CV:  no edema, rate-normal  ABDOMEN:  bowel sounds normal  M/S:   Gait and station abnormal unsteady gait  SKIN:  Inspection of skin and subcutaneous tissue baseline  NEURO:   Examination of sensation by touch normal  PSYCH:  affect and mood normal    Labs done in SNF are in [facility]. Please refer to them using [facility]/Care Everywhere.    Assessment/Plan:  (M25.551) Hip pain, right  (primary encounter diagnosis)  Comment: R hip aspiration completed, no growth x3-4 days, pain improving  Plan:   -PT/OT working with  -lab to continue checking for aspirate growth  -continue lidocaine, oxycodone, APAP for pain  -CBC, CMP, CRP, ESR on 4/17    (R06.09) Dyspnea on exertion  (R06.2) Wheezing  Comment: lungs clear, mild JULIANE, on " RA  Plan: change advair and pulmicort to BID PRN    (K59.01) Slow transit constipation  Comment: mild firming up of stool with oxy use  Plan: start senna 2 tabs BID PRN  -patient instructed to track bowels, ask nurse if having firmer stool    MED REC REQUIRED  Post Medication Reconciliation Status: medication reconcilation previously completed during another office visit        Electronically signed by: RONALDO Izaguirre CNP

## 2023-04-14 NOTE — TELEPHONE ENCOUNTER
HOLD spot cancelled due to pt currently at the [facility].        Umair Hawkins CMA ([facility]) at 6:58 AM on 4/14/2023

## 2023-04-15 LAB — BACTERIA SNV CULT: NO GROWTH

## 2023-04-17 PROCEDURE — 85652 RBC SED RATE AUTOMATED: CPT | Mod: ORL | Performed by: FAMILY MEDICINE

## 2023-04-17 PROCEDURE — 85027 COMPLETE CBC AUTOMATED: CPT | Mod: ORL | Performed by: FAMILY MEDICINE

## 2023-04-17 PROCEDURE — 80053 COMPREHEN METABOLIC PANEL: CPT | Mod: ORL | Performed by: FAMILY MEDICINE

## 2023-04-17 PROCEDURE — 86140 C-REACTIVE PROTEIN: CPT | Mod: ORL | Performed by: FAMILY MEDICINE

## 2023-04-17 PROCEDURE — 86141 C-REACTIVE PROTEIN HS: CPT | Mod: ORL | Performed by: FAMILY MEDICINE

## 2023-04-18 ENCOUNTER — TRANSITIONAL CARE UNIT VISIT (OUTPATIENT)
Dept: GERIATRICS | Facility: CLINIC | Age: 63
End: 2023-04-18
Payer: COMMERCIAL

## 2023-04-18 VITALS
DIASTOLIC BLOOD PRESSURE: 80 MMHG | TEMPERATURE: 98.7 F | WEIGHT: 183.4 LBS | RESPIRATION RATE: 16 BRPM | HEIGHT: 68 IN | HEART RATE: 58 BPM | OXYGEN SATURATION: 93 % | SYSTOLIC BLOOD PRESSURE: 118 MMHG | BODY MASS INDEX: 27.8 KG/M2

## 2023-04-18 DIAGNOSIS — M87.051 AVASCULAR NECROSIS OF BONES OF BOTH HIPS (H): Primary | ICD-10-CM

## 2023-04-18 DIAGNOSIS — M25.551 HIP PAIN, RIGHT: ICD-10-CM

## 2023-04-18 DIAGNOSIS — K59.01 SLOW TRANSIT CONSTIPATION: ICD-10-CM

## 2023-04-18 DIAGNOSIS — M87.052 AVASCULAR NECROSIS OF BONES OF BOTH HIPS (H): Primary | ICD-10-CM

## 2023-04-18 PROCEDURE — 99309 SBSQ NF CARE MODERATE MDM 30: CPT | Performed by: NURSE PRACTITIONER

## 2023-04-18 NOTE — PROGRESS NOTES
"[facility] GERIATRICS    Chief Complaint   Patient presents with     RECHECK     HPI:  Jerad Ross is a 63 year old  (1960), who is being seen today for an episodic care visit at: [facility] AT [facility] ([facility]) [[id number]]. Today's concern is:   1. Avascular necrosis of bones of both hips (H)    2. Hip pain, right    3. Slow transit constipation      Patient seen for follow up, hips both avascular, newer R hip pain, aspiration of R hip joint approx 10mL cloudy fluid with no growth after multiple days, is able to ambulate with walker and starting to work on stairs, taking oxycodone regularly for pain, now having some mild constipation, encouraged to take PRN bowel meds and tell nurse of any symptoms, overall appears healthy, probable discharge soon.    Allergies, and PMH/PSH reviewed in [facility] today.  REVIEW OF SYSTEMS:  4 point ROS including Respiratory, CV, GI and , other than that noted in the HPI,  is negative    Objective:   /80   Pulse 58   Temp 98.7  F (37.1  C)   Resp 16   Ht 1.727 m (5' 8\")   Wt 83.2 kg (183 lb 6.4 oz)   SpO2 93%   BMI 27.89 kg/m    GENERAL APPEARANCE:  in no distress, appears healthy  ENT:  Mouth and posterior oropharynx normal, moist mucous membranes  RESP:  lungs clear to auscultation   CV:  no edema, rate-normal  ABDOMEN:  bowel sounds normal  M/S:   Gait and station abnormal using walker  SKIN:  Inspection of skin and subcutaneous tissue baseline  NEURO:   Examination of sensation by touch normal  PSYCH:  affect and mood normal    Labs done in SNF are in [facility]. Please refer to them using [facility]/Care Everywhere.    Assessment/Plan:  (M87.051,  M87.052) Avascular necrosis of bones of both hips (H)  (primary encounter diagnosis)  (M25.551) Hip pain, right  (K59.01) Slow transit constipation  Comment: hip pain, aspiration no growth, mild constipation  Plan:   -continue oxycodone 5-10mg PRN, lidocaine patch, APAP QID scheduled  -follow up with aspiration " cultures  -start senna 2 tabs BID PRN  -to make appt with [facility] for R hip revision (would be the 2nd revision)  -PT/OT working with  -SW to meet with regarding home services  -probable discharge by next week    MED REC REQUIRED  Post Medication Reconciliation Status: medication reconcilation previously completed during another office visit         Electronically signed by: RONALDO Izaguirre CNP

## 2023-04-18 NOTE — LETTER
"    4/18/2023        RE: Jerad Ross  [address] [place] [address]        [facility] GERIATRICS    Chief Complaint   Patient presents with     RECHECK     HPI:  Jerad Ross is a 63 year old  (1960), who is being seen today for an episodic care visit at: [facility] AT [facility] ([facility]) [[id number]]. Today's concern is:   1. Avascular necrosis of bones of both hips (H)    2. Hip pain, right    3. Slow transit constipation      Patient seen for follow up, hips both avascular, newer R hip pain, aspiration of R hip joint approx 10mL cloudy fluid with no growth after multiple days, is able to ambulate with walker and starting to work on stairs, taking oxycodone regularly for pain, now having some mild constipation, encouraged to take PRN bowel meds and tell nurse of any symptoms, overall appears healthy, probable discharge soon.    Allergies, and PMH/PSH reviewed in [facility] today.  REVIEW OF SYSTEMS:  4 point ROS including Respiratory, CV, GI and , other than that noted in the HPI,  is negative    Objective:   /80   Pulse 58   Temp 98.7  F (37.1  C)   Resp 16   Ht 1.727 m (5' 8\")   Wt 83.2 kg (183 lb 6.4 oz)   SpO2 93%   BMI 27.89 kg/m    GENERAL APPEARANCE:  in no distress, appears healthy  ENT:  Mouth and posterior oropharynx normal, moist mucous membranes  RESP:  lungs clear to auscultation   CV:  no edema, rate-normal  ABDOMEN:  bowel sounds normal  M/S:   Gait and station abnormal using walker  SKIN:  Inspection of skin and subcutaneous tissue baseline  NEURO:   Examination of sensation by touch normal  PSYCH:  affect and mood normal    Labs done in SNF are in [facility]. Please refer to them using [facility]/Care Everywhere.    Assessment/Plan:  (M87.051,  M87.052) Avascular necrosis of bones of both hips (H)  (primary encounter diagnosis)  (M25.551) Hip pain, right  (K59.01) Slow transit constipation  Comment: hip pain, aspiration no growth, mild constipation  Plan:   -continue " oxycodone 5-10mg PRN, lidocaine patch, APAP QID scheduled  -follow up with aspiration cultures  -start senna 2 tabs BID PRN  -to make appt with [facility] for R hip revision (would be the 2nd revision)  -PT/OT working with  -SW to meet with regarding home services  -probable discharge by next week    MED REC REQUIRED  Post Medication Reconciliation Status: medication reconcilation previously completed during another office visit         Electronically signed by: RONALDO Izaguirre CNP           Sincerely,        RONALDO Izaguirre CNP

## 2023-04-21 ENCOUNTER — TRANSITIONAL CARE UNIT VISIT (OUTPATIENT)
Dept: GERIATRICS | Facility: CLINIC | Age: 63
End: 2023-04-21
Payer: COMMERCIAL

## 2023-04-21 VITALS
SYSTOLIC BLOOD PRESSURE: 120 MMHG | WEIGHT: 183.4 LBS | RESPIRATION RATE: 19 BRPM | TEMPERATURE: 97.8 F | HEIGHT: 68 IN | HEART RATE: 60 BPM | BODY MASS INDEX: 27.8 KG/M2 | DIASTOLIC BLOOD PRESSURE: 74 MMHG | OXYGEN SATURATION: 93 %

## 2023-04-21 DIAGNOSIS — M87.051 AVASCULAR NECROSIS OF BONES OF BOTH HIPS (H): Primary | ICD-10-CM

## 2023-04-21 DIAGNOSIS — K59.01 SLOW TRANSIT CONSTIPATION: ICD-10-CM

## 2023-04-21 DIAGNOSIS — M25.551 HIP PAIN, RIGHT: ICD-10-CM

## 2023-04-21 DIAGNOSIS — M87.052 AVASCULAR NECROSIS OF BONES OF BOTH HIPS (H): Primary | ICD-10-CM

## 2023-04-21 PROCEDURE — 99309 SBSQ NF CARE MODERATE MDM 30: CPT | Performed by: NURSE PRACTITIONER

## 2023-04-21 NOTE — LETTER
"    4/21/2023        RE: Jerad Ross  [address] [place] [address]        [facility] GERIATRICS    Chief Complaint   Patient presents with     RECHECK     HPI:  Jerad Ross is a 63 year old  (1960), who is being seen today for an episodic care visit at: [facility] AT [facility] ([facility]) [[id number]]. Today's concern is:   1. Avascular necrosis of bones of both hips (H)    2. Hip pain, right    3. Slow transit constipation      Patient seen for follow up, bilateral hip pain, aspiration fluid R hip now no growth after 10 days, still pain+ and taking oxycodone with relief, now having some mild constipation but able to ask for senokot and miralax if needed, participating with therapies, requesting support service list for assistance when returns home likely next week.     Allergies, and PMH/PSH reviewed in EPIC today.  REVIEW OF SYSTEMS:  4 point ROS including Respiratory, CV, GI and , other than that noted in the HPI,  is negative    Objective:   /74   Pulse 60   Temp 97.8  F (36.6  C)   Resp 19   Ht 1.727 m (5' 8\")   Wt 83.2 kg (183 lb 6.4 oz)   SpO2 93%   BMI 27.89 kg/m    GENERAL APPEARANCE:  in no distress, appears healthy  ENT:  Mouth and posterior oropharynx normal, moist mucous membranes  RESP:  lungs clear to auscultation   CV:  no edema, rate-normal  ABDOMEN:  bowel sounds normal  M/S:   Gait and station abnormal unsteady gait  SKIN:  Inspection of skin and subcutaneous tissue baseline  NEURO:   Examination of sensation by touch normal  PSYCH:  affect and mood normal    Labs done in SNF are in [facility]. Please refer to them using [facility]/Care Everywhere.    Assessment/Plan:  (M87.051,  M87.052) Avascular necrosis of bones of both hips (H)  (primary encounter diagnosis)  (M25.551) Hip pain, right  Comment: R hip aspiration culture negative growth, pain controlled  Plan: continue oxycodone, lidocaine, APAP QID  -PT/OT working with  -probable discharge next week  -appt with " [facility] for potential 2nd revision R hip on 4/26    (K59.01) Slow transit constipation  Comment: I&O adequate, stools intermittent firm  Plan: continue sennokot changed to BID PRN and miralax PRN  -instructed to inform nurse of what med he would prefer to have to maintain formed soft stool    MED REC REQUIRED  Post Medication Reconciliation Status: medication reconcilation previously completed during another office visit      Electronically signed by: RONALDO Izaguirre CNP           Sincerely,        RONALDO Izaguirre CNP

## 2023-04-21 NOTE — PROGRESS NOTES
"[facility] GERIATRICS    Chief Complaint   Patient presents with     RECHECK     HPI:  Jerad Ross is a 63 year old  (1960), who is being seen today for an episodic care visit at: [facility] AT [facility] ([facility]) [[id number]]. Today's concern is:   1. Avascular necrosis of bones of both hips (H)    2. Hip pain, right    3. Slow transit constipation      Patient seen for follow up, bilateral hip pain, aspiration fluid R hip now no growth after 10 days, still pain+ and taking oxycodone with relief, now having some mild constipation but able to ask for senokot and miralax if needed, participating with therapies, requesting support service list for assistance when returns home likely next week.     Allergies, and PMH/PSH reviewed in [facility] today.  REVIEW OF SYSTEMS:  4 point ROS including Respiratory, CV, GI and , other than that noted in the HPI,  is negative    Objective:   /74   Pulse 60   Temp 97.8  F (36.6  C)   Resp 19   Ht 1.727 m (5' 8\")   Wt 83.2 kg (183 lb 6.4 oz)   SpO2 93%   BMI 27.89 kg/m    GENERAL APPEARANCE:  in no distress, appears healthy  ENT:  Mouth and posterior oropharynx normal, moist mucous membranes  RESP:  lungs clear to auscultation   CV:  no edema, rate-normal  ABDOMEN:  bowel sounds normal  M/S:   Gait and station abnormal unsteady gait  SKIN:  Inspection of skin and subcutaneous tissue baseline  NEURO:   Examination of sensation by touch normal  PSYCH:  affect and mood normal    Labs done in SNF are in [facility]. Please refer to them using [facility]/Care Everywhere.    Assessment/Plan:  (M87.051,  M87.052) Avascular necrosis of bones of both hips (H)  (primary encounter diagnosis)  (M25.551) Hip pain, right  Comment: R hip aspiration culture negative growth, pain controlled  Plan: continue oxycodone, lidocaine, APAP QID  -PT/OT working with  -probable discharge next week  -appt with [facility] for potential 2nd revision R hip on 4/26    (K59.01) Slow transit " constipation  Comment: I&O adequate, stools intermittent firm  Plan: continue sennokot changed to BID PRN and miralax PRN  -instructed to inform nurse of what med he would prefer to have to maintain formed soft stool    MED REC REQUIRED  Post Medication Reconciliation Status: medication reconcilation previously completed during another office visit      Electronically signed by: RONALDO Izaguirre CNP

## 2023-04-24 LAB — BACTERIA SNV CULT: NORMAL

## 2023-04-25 ENCOUNTER — TRANSITIONAL CARE UNIT VISIT (OUTPATIENT)
Dept: GERIATRICS | Facility: CLINIC | Age: 63
End: 2023-04-25
Payer: COMMERCIAL

## 2023-04-25 VITALS
TEMPERATURE: 96.4 F | HEART RATE: 52 BPM | BODY MASS INDEX: 27.8 KG/M2 | RESPIRATION RATE: 18 BRPM | HEIGHT: 68 IN | SYSTOLIC BLOOD PRESSURE: 120 MMHG | DIASTOLIC BLOOD PRESSURE: 83 MMHG | OXYGEN SATURATION: 96 % | WEIGHT: 183.4 LBS

## 2023-04-25 DIAGNOSIS — M62.81 GENERALIZED MUSCLE WEAKNESS: ICD-10-CM

## 2023-04-25 DIAGNOSIS — I10 HYPERTENSION, UNSPECIFIED TYPE: ICD-10-CM

## 2023-04-25 DIAGNOSIS — Z94.0 KIDNEY REPLACED BY TRANSPLANT: ICD-10-CM

## 2023-04-25 DIAGNOSIS — N18.6 END STAGE RENAL DISEASE (H): ICD-10-CM

## 2023-04-25 DIAGNOSIS — M25.551 HIP PAIN, RIGHT: ICD-10-CM

## 2023-04-25 DIAGNOSIS — M87.051 AVASCULAR NECROSIS OF BONES OF BOTH HIPS (H): Primary | ICD-10-CM

## 2023-04-25 DIAGNOSIS — M87.052 AVASCULAR NECROSIS OF BONES OF BOTH HIPS (H): Primary | ICD-10-CM

## 2023-04-25 PROCEDURE — 99316 NF DSCHRG MGMT 30 MIN+: CPT | Performed by: NURSE PRACTITIONER

## 2023-04-25 NOTE — LETTER
4/25/2023        RE: Jerad Ross  [address] [place] [address]        [facility] GERIATRICS DISCHARGE SUMMARY  PATIENT'S NAME: Jerad Ross  YOB: 1960  MEDICAL RECORD NUMBER:  [id number]  Place of Service where encounter took place:  [facility]  AT [facility] (TCU) [[id number]]    PRIMARY CARE PROVIDER AND CLINIC RESPONSIBLE AFTER TRANSFER:   Aston Perry MD, [address] [facility] [place] [address]    [facility] [place]     Transferring providers: RONALDO Dawson CNP; Mykel Arriaga MD  Recent Hospitalization/ED:  [facility] stay 04/08/2023 to 04/10/2023  Date of SNF Admission: 04/10/2023  Date of SNF (anticipated) Discharge: April 25, 2023  Discharged to: previous independent home  Cognitive Scores: NA  Physical Function: Pivot transfers  DME: [facility]    CODE STATUS/ADVANCE DIRECTIVES DISCUSSION:  Full Code   ALLERGIES: Penicillins, Keflex [cephalexin hcl], Tetracycline, and Sulfa antibiotics    NURSING FACILITY COURSE   Medication Changes/Rationale:     See notes    Summary of nursing facility stay:      Avascular necrosis of bones of both hips (H)  Hip pain, right  Generalized muscle weakness  End stage renal disease (H)  Kidney replaced by transplant  Hypertension, unspecified type     (M87.051,  M87.052) Avascular necrosis of bones of both hips (H)  (primary encounter diagnosis)  (M25.551) Hip pain, right  (M62.81) Generalized muscle weakness  Comment: bilateral hips then revisions, R hip pain  Plan:   -follow up [facility] for another revision PRN  -change APAP to QID  -PT/OT eval and treat  -change symbicort and advair to PRN  -start senokot 2 tabs BID PRN  -continue lidocaine, oxycodone, miralax  -place parameters on oxy 5 or 10mg     (N18.6) End stage renal disease (H)  (Z94.0) Kidney replaced by transplant  Comment: Hx of transplants starting age 18, GFR on 4/10 was >90  Plan: continue prednisone,  micophenolate  -follow up nephrology PRN     (I10) Hypertension, unspecified type  Comment: SBP's in the 130 range  Plan: continue lasix, metoprolol, K supplement  -staff to check VS daily    Discharge Medications:  MED REC REQUIRED  Post Medication Reconciliation Status: discharge medications reconciled, continue medications without change       Current Outpatient Medications   Medication Sig Dispense Refill     acetaminophen (TYLENOL) 325 MG tablet Take 1-2 tablets by mouth 4 times daily       albuterol (PROAIR HFA) 108 (90 Base) MCG/ACT inhaler Inhale 2 puffs into the lungs every 6 hours as needed for shortness of breath / dyspnea or wheezing 1 g 11     aspirin 81 MG tablet Take 81 mg by mouth daily        budesonide (PULMICORT FLEXHALER) 90 MCG/ACT inhaler Inhale 2 puffs into the lungs 2 times daily as needed (PRN) 1 each 8     calcium citrate-vitamin D (CITRACAL) 315-200 MG-UNIT TABS Take 1 tablet by mouth daily.       entecavir (BARACLUDE) 0.5 MG tablet Take 1 tablet (0.5 mg) by mouth daily APPT NEEDED FOR FURTHER REFILLS 90 tablet 3     FLUoxetine (PROZAC) 40 MG capsule Take 1 capsule (40 mg) by mouth daily 90 capsule 2     fluticasone-salmeterol (ADVAIR) 250-50 MCG/ACT inhaler Inhale 1 puff into the lungs 2 times daily as needed (PRN) 180 each 3     furosemide (LASIX) 20 MG tablet Take 1 tablet (20 mg) by mouth daily as needed (fluid retention) 90 tablet 1     isosorbide mononitrate (IMDUR) 30 MG 24 hr tablet TAKE 1 TABLET(30 MG) BY MOUTH DAILY 90 tablet 3     latanoprost (XALATAN) 0.005 % ophthalmic solution Place 1 drop into both eyes At Bedtime 2.5 mL 11     Lidocaine (LIDOCARE) 4 % Patch Place 1 patch onto the skin every 24 hours To prevent lidocaine toxicity, patient should be patch free for 12 hrs daily.       metoprolol tartrate (LOPRESSOR) 25 MG tablet TAKE 1/2 TABLET(12.5 MG) BY MOUTH TWICE DAILY 90 tablet 1     Multiple Vitamins-Iron (ONE DAILY MULTIVITAMIN/IRON) TABS Take 1 tablet by mouth daily        mycophenolate (GENERIC EQUIVALENT) 250 MG capsule Take 3 capsules (750 mg) by mouth 2 times daily 540 capsule 0     nitroGLYcerin (NITROSTAT) 0.4 MG sublingual tablet Place 1 tablet (0.4 mg) under the tongue every 5 minutes as needed for chest pain 20 tablet 3     Omega-3 Fatty Acids (OMEGA 3 PO) Take 1 capsule by mouth daily Dose unknown       ondansetron (ZOFRAN ODT) 4 MG ODT tab Take 1 tablet (4 mg) by mouth every 6 hours as needed for nausea or vomiting       oxyCODONE (ROXICODONE) 5 MG tablet Take 1-2 tablets (5-10 mg) by mouth every 4 hours as needed for moderate pain or severe pain 5 tablet 0     pantoprazole (PROTONIX) 40 MG EC tablet Take 1 tablet (40 mg) by mouth daily 90 tablet 3     polyethylene glycol (MIRALAX) 17 g packet Take 17 g by mouth daily as needed        potassium chloride ER (KLOR-CON M) 20 MEQ CR tablet Take 1 tablet (20 mEq) by mouth daily 100 tablet 1     predniSONE (DELTASONE) 5 MG tablet Take 1 tablet (5 mg) by mouth daily 90 tablet 0     rosuvastatin (CRESTOR) 20 MG tablet TAKE 1 TABLET(20 MG) BY MOUTH DAILY (Patient taking differently: Take 20 mg by mouth At Bedtime) 90 tablet 3     senna-docusate (SENOKOT-S/PERICOLACE) 8.6-50 MG tablet Take 2 tablets by mouth 2 times daily as needed for constipation 1 tablet 0     tacrolimus (PROTOPIC) 0.1 % external ointment Apply topically 2 times daily 60 g 3          Controlled medications:   current Rx for oxycodone to be sent with patient     Past Medical History:   Past Medical History:   Diagnosis Date     Acute midline low back pain without sciatica      AION (acute ischemic optic neuropathy)      AION (acute ischemic optic neuropathy)      Anemia in chronic renal disease      Anemia in chronic renal disease      Avascular necrosis of bones of both hips (H)     s/p bilateral hip replacements     Avascular necrosis of bones of both hips (H)     s/p bilateral hip replacements     Basal cell carcinoma      Basal cell carcinoma      Chronic  "hepatitis B (H)      Chronic hepatitis B (H)      Clostridium difficile colitis      Clostridium difficile colitis      Coronary artery disease involving native coronary artery of native heart without angina pectoris 6/17/2014    Coronary angiogram 6/17/14: Severe distal 3-vessel disease involving small vessels, not amenable to PCI or CABG.     Coronary artery disease involving native coronary artery of native heart without angina pectoris 06/17/2014    Coronary angiogram 6/17/14: Severe distal 3-vessel disease involving small vessels, not amenable to PCI or CABG     CRP elevated      Depression      Depression      Diverticulosis      Diverticulosis      Dyslipidemia      Dyslipidemia      Elevated C-reactive protein (CRP)      FSGS (focal segmental glomerulosclerosis)      FSGS (focal segmental glomerulosclerosis)      Gastric ulcer with hemorrhage 2/12/12     Gastric ulcer with hemorrhage 02/12/2012     GERD (gastroesophageal reflux disease)      GERD (gastroesophageal reflux disease)      Glaucoma     OHTN     Glaucoma      Hemorrhoids      Hemorrhoids      HTN (hypertension)      Hyperlipidemia      Hypertension secondary to other renal disorders      Hypogonadism in male      Hypogonadism in male      Immunosuppressed status (H)      Kidney replaced by transplant     focal glomerulosclerosis      Kidney transplanted     focal glomerulosclerosis      NSTEMI (non-ST elevated myocardial infarction) (H) 6/17/2014     NSTEMI (non-ST elevated myocardial infarction) (H) 06/17/2014     JULIANE (obstructive sleep apnea)     Doesn't use CPAP     JULIANE (obstructive sleep apnea)      Paracentral scotoma     LE     Paracentral scotoma     LE     Secondary renal hyperparathyroidism (H)      Secondary renal hyperparathyroidism (H)      Septic bursitis      Squamous cell carcinoma      Squamous cell carcinoma      Physical Exam:   Vitals: /83   Pulse 52   Temp (!) 96.4  F (35.8  C)   Resp 18   Ht 1.727 m (5' 8\")   Wt " 83.2 kg (183 lb 6.4 oz)   SpO2 96%   BMI 27.89 kg/m    BMI: Body mass index is 27.89 kg/m .  GENERAL APPEARANCE:  in no distress, appears healthy  ENT:  Mouth and posterior oropharynx normal, moist mucous membranes  RESP:  lungs clear to auscultation , no respiratory distress  CV:  no edema, rate-normal  ABDOMEN:  bowel sounds normal  M/S:   Gait and station abnormal unsteady gait  SKIN:  Inspection of skin and subcutaneous tissue baseline  NEURO:   Examination of sensation by touch normal  PSYCH:  affect and mood normal     SNF labs: Labs done in SNF are in [place] EPIC. Please refer to them using EPIC/Care Everywhere.    DISCHARGE PLAN:    Follow up labs: No labs orders/due    Medical Follow Up:      Follow up with primary care provider in 1 weeks    Current [place] scheduled appointments:   NA    Discharge Services: Home Care:  Occupational Therapy, Physical Therapy, Registered Nurse, Home Health Aide and     Discharge Instructions Verbalized to Patient at Discharge:     None    TOTAL DISCHARGE TIME:   Greater than 30 minutes  Electronically signed by:  RONALDO Izaguirre CNP         Documentation of Face-to-Face and Certification for Home Health Services     Patient: Jerad Ross   YOB: 1960  MR Number: [id number]  Today's Date: 4/25/2023    I certify that patient: Jerad Ross is under my care and that I, or a nurse practitioner or physician's assistant working with me, had a face-to-face encounter that meets the physician face-to-face encounter requirements with this patient on: 4/25/2023.    This encounter with the patient was in whole, or in part, for the following medical condition, which is the primary reason for home health care:   1. Avascular necrosis of bones of both hips (H)    2. Hip pain, right    3. Generalized muscle weakness    4. End stage renal disease (H)    5. Kidney replaced by transplant    6. Hypertension, unspecified type          I certify  that, based on my findings, the following services are medically necessary home health services: Nursing, Occupational Therapy, Physical Therapy, Social Work and HHA.    My clinical findings support the need for the above services because: Nurse is needed: To provide caregiver training to assist with: pain, med regimen.., Occupational Therapy Services are needed to assess and treat cognitive ability and address ADL safety due to impairment in transfers., Physical Therapy Services are needed to assess and treat the following functional impairments: gait instability. and  for community resources, HHA for bathing.    Further, I certify that my clinical findings support that this patient is homebound (i.e. absences from home require considerable and taxing effort and are for medical reasons or Spiritism services or infrequently or of short duration when for other reasons) because: Requires assistance of another person or specialized equipment to access medical services because patient: Is unable to operate assistive equipment on their own...    Based on the above findings. I certify that this patient is confined to the home and needs intermittent skilled nursing care, physical therapy and/or speech therapy.  The patient is under my care, and I have initiated the establishment of the plan of care.  This patient will be followed by a physician who will periodically review the plan of care.  Physician/Provider to provide follow up care: Aston Perry    Responsible Medicare certified [employer] Physician: Nolan Santoyo NP  Electronic Physician Signature  Date: 4/25/2023                  Sincerely,        RONALDO Izaguirre CNP

## 2023-04-25 NOTE — PROGRESS NOTES
[facility] GERIATRICS DISCHARGE SUMMARY  PATIENT'S NAME: Jerad Ross  YOB: 1960  MEDICAL RECORD NUMBER:  [id number]  Place of Service where encounter took place:  [facility]  AT [facility] (TCU) [[id number]]    PRIMARY CARE PROVIDER AND CLINIC RESPONSIBLE AFTER TRANSFER:   Aston Perry MD, [address] [facility] [address]    [facility] [place], [place] Provider     Transferring providers: RONALDO Dawson CNP; Mykel Arriaga MD  Recent Hospitalization/ED:  [facility] stay 04/08/2023 to 04/10/2023  Date of SNF Admission: 04/10/2023  Date of SNF (anticipated) Discharge: April 25, 2023  Discharged to: previous independent home  Cognitive Scores: NA  Physical Function: Pivot transfers  DME: [facility]    CODE STATUS/ADVANCE DIRECTIVES DISCUSSION:  Full Code   ALLERGIES: Penicillins, Keflex [cephalexin hcl], Tetracycline, and Sulfa antibiotics    NURSING FACILITY COURSE   Medication Changes/Rationale:     See notes    Summary of nursing facility stay:      Avascular necrosis of bones of both hips (H)  Hip pain, right  Generalized muscle weakness  End stage renal disease (H)  Kidney replaced by transplant  Hypertension, unspecified type     (M87.051,  M87.052) Avascular necrosis of bones of both hips (H)  (primary encounter diagnosis)  (M25.551) Hip pain, right  (M62.81) Generalized muscle weakness  Comment: bilateral hips then revisions, R hip pain  Plan:   -follow up [facility] for another revision PRN  -change APAP to QID  -PT/OT eval and treat  -change symbicort and advair to PRN  -start senokot 2 tabs BID PRN  -continue lidocaine, oxycodone, miralax  -place parameters on oxy 5 or 10mg     (N18.6) End stage renal disease (H)  (Z94.0) Kidney replaced by transplant  Comment: Hx of transplants starting age 18, GFR on 4/10 was >90  Plan: continue prednisone, micophenolate  -follow up nephrology PRN     (I10) Hypertension, unspecified type  Comment: SBP's in  the 130 range  Plan: continue lasix, metoprolol, K supplement  -staff to check VS daily    Discharge Medications:  MED REC REQUIRED  Post Medication Reconciliation Status: discharge medications reconciled, continue medications without change       Current Outpatient Medications   Medication Sig Dispense Refill     acetaminophen (TYLENOL) 325 MG tablet Take 1-2 tablets by mouth 4 times daily       albuterol (PROAIR HFA) 108 (90 Base) MCG/ACT inhaler Inhale 2 puffs into the lungs every 6 hours as needed for shortness of breath / dyspnea or wheezing 1 g 11     aspirin 81 MG tablet Take 81 mg by mouth daily        budesonide (PULMICORT FLEXHALER) 90 MCG/ACT inhaler Inhale 2 puffs into the lungs 2 times daily as needed (PRN) 1 each 8     calcium citrate-vitamin D (CITRACAL) 315-200 MG-UNIT TABS Take 1 tablet by mouth daily.       entecavir (BARACLUDE) 0.5 MG tablet Take 1 tablet (0.5 mg) by mouth daily APPT NEEDED FOR FURTHER REFILLS 90 tablet 3     FLUoxetine (PROZAC) 40 MG capsule Take 1 capsule (40 mg) by mouth daily 90 capsule 2     fluticasone-salmeterol (ADVAIR) 250-50 MCG/ACT inhaler Inhale 1 puff into the lungs 2 times daily as needed (PRN) 180 each 3     furosemide (LASIX) 20 MG tablet Take 1 tablet (20 mg) by mouth daily as needed (fluid retention) 90 tablet 1     isosorbide mononitrate (IMDUR) 30 MG 24 hr tablet TAKE 1 TABLET(30 MG) BY MOUTH DAILY 90 tablet 3     latanoprost (XALATAN) 0.005 % ophthalmic solution Place 1 drop into both eyes At Bedtime 2.5 mL 11     Lidocaine (LIDOCARE) 4 % Patch Place 1 patch onto the skin every 24 hours To prevent lidocaine toxicity, patient should be patch free for 12 hrs daily.       metoprolol tartrate (LOPRESSOR) 25 MG tablet TAKE 1/2 TABLET(12.5 MG) BY MOUTH TWICE DAILY 90 tablet 1     Multiple Vitamins-Iron (ONE DAILY MULTIVITAMIN/IRON) TABS Take 1 tablet by mouth daily       mycophenolate (GENERIC EQUIVALENT) 250 MG capsule Take 3 capsules (750 mg) by mouth 2 times  daily 540 capsule 0     nitroGLYcerin (NITROSTAT) 0.4 MG sublingual tablet Place 1 tablet (0.4 mg) under the tongue every 5 minutes as needed for chest pain 20 tablet 3     Omega-3 Fatty Acids (OMEGA 3 PO) Take 1 capsule by mouth daily Dose unknown       ondansetron (ZOFRAN ODT) 4 MG ODT tab Take 1 tablet (4 mg) by mouth every 6 hours as needed for nausea or vomiting       oxyCODONE (ROXICODONE) 5 MG tablet Take 1-2 tablets (5-10 mg) by mouth every 4 hours as needed for moderate pain or severe pain 5 tablet 0     pantoprazole (PROTONIX) 40 MG EC tablet Take 1 tablet (40 mg) by mouth daily 90 tablet 3     polyethylene glycol (MIRALAX) 17 g packet Take 17 g by mouth daily as needed        potassium chloride ER (KLOR-CON M) 20 MEQ CR tablet Take 1 tablet (20 mEq) by mouth daily 100 tablet 1     predniSONE (DELTASONE) 5 MG tablet Take 1 tablet (5 mg) by mouth daily 90 tablet 0     rosuvastatin (CRESTOR) 20 MG tablet TAKE 1 TABLET(20 MG) BY MOUTH DAILY (Patient taking differently: Take 20 mg by mouth At Bedtime) 90 tablet 3     senna-docusate (SENOKOT-S/PERICOLACE) 8.6-50 MG tablet Take 2 tablets by mouth 2 times daily as needed for constipation 1 tablet 0     tacrolimus (PROTOPIC) 0.1 % external ointment Apply topically 2 times daily 60 g 3          Controlled medications:   current Rx for oxycodone to be sent with patient     Past Medical History:   Past Medical History:   Diagnosis Date     Acute midline low back pain without sciatica      AION (acute ischemic optic neuropathy)      AION (acute ischemic optic neuropathy)      Anemia in chronic renal disease      Anemia in chronic renal disease      Avascular necrosis of bones of both hips (H)     s/p bilateral hip replacements     Avascular necrosis of bones of both hips (H)     s/p bilateral hip replacements     Basal cell carcinoma      Basal cell carcinoma      Chronic hepatitis B (H)      Chronic hepatitis B (H)      Clostridium difficile colitis      Clostridium  "difficile colitis      Coronary artery disease involving native coronary artery of native heart without angina pectoris 6/17/2014    Coronary angiogram 6/17/14: Severe distal 3-vessel disease involving small vessels, not amenable to PCI or CABG.     Coronary artery disease involving native coronary artery of native heart without angina pectoris 06/17/2014    Coronary angiogram 6/17/14: Severe distal 3-vessel disease involving small vessels, not amenable to PCI or CABG     CRP elevated      Depression      Depression      Diverticulosis      Diverticulosis      Dyslipidemia      Dyslipidemia      Elevated C-reactive protein (CRP)      FSGS (focal segmental glomerulosclerosis)      FSGS (focal segmental glomerulosclerosis)      Gastric ulcer with hemorrhage 2/12/12     Gastric ulcer with hemorrhage 02/12/2012     GERD (gastroesophageal reflux disease)      GERD (gastroesophageal reflux disease)      Glaucoma     OHTN     Glaucoma      Hemorrhoids      Hemorrhoids      HTN (hypertension)      Hyperlipidemia      Hypertension secondary to other renal disorders      Hypogonadism in male      Hypogonadism in male      Immunosuppressed status (H)      Kidney replaced by transplant     focal glomerulosclerosis      Kidney transplanted     focal glomerulosclerosis      NSTEMI (non-ST elevated myocardial infarction) (H) 6/17/2014     NSTEMI (non-ST elevated myocardial infarction) (H) 06/17/2014     JULIANE (obstructive sleep apnea)     Doesn't use CPAP     JULIANE (obstructive sleep apnea)      Paracentral scotoma     LE     Paracentral scotoma     LE     Secondary renal hyperparathyroidism (H)      Secondary renal hyperparathyroidism (H)      Septic bursitis      Squamous cell carcinoma      Squamous cell carcinoma      Physical Exam:   Vitals: /83   Pulse 52   Temp (!) 96.4  F (35.8  C)   Resp 18   Ht 1.727 m (5' 8\")   Wt 83.2 kg (183 lb 6.4 oz)   SpO2 96%   BMI 27.89 kg/m    BMI: Body mass index is 27.89 " kg/m .  GENERAL APPEARANCE:  in no distress, appears healthy  ENT:  Mouth and posterior oropharynx normal, moist mucous membranes  RESP:  lungs clear to auscultation , no respiratory distress  CV:  no edema, rate-normal  ABDOMEN:  bowel sounds normal  M/S:   Gait and station abnormal unsteady gait  SKIN:  Inspection of skin and subcutaneous tissue baseline  NEURO:   Examination of sensation by touch normal  PSYCH:  affect and mood normal     SNF labs: Labs done in SNF are in [place] EPIC. Please refer to them using EPIC/Care Everywhere.    DISCHARGE PLAN:    Follow up labs: No labs orders/due    Medical Follow Up:      Follow up with primary care provider in 1 weeks    Current [place] scheduled appointments:   NA    Discharge Services: Home Care:  Occupational Therapy, Physical Therapy, Registered Nurse, Home Health Aide and     Discharge Instructions Verbalized to Patient at Discharge:     None    TOTAL DISCHARGE TIME:   Greater than 30 minutes  Electronically signed by:  RONALDO Izaguirre CNP         Documentation of Face-to-Face and Certification for Home Health Services     Patient: Jerad Ross   YOB: 1960  MR Number: [id number]  Today's Date: 4/25/2023    I certify that patient: Jerad Ross is under my care and that I, or a nurse practitioner or physician's assistant working with me, had a face-to-face encounter that meets the physician face-to-face encounter requirements with this patient on: 4/25/2023.    This encounter with the patient was in whole, or in part, for the following medical condition, which is the primary reason for home health care:   1. Avascular necrosis of bones of both hips (H)    2. Hip pain, right    3. Generalized muscle weakness    4. End stage renal disease (H)    5. Kidney replaced by transplant    6. Hypertension, unspecified type          I certify that, based on my findings, the following services are medically necessary home health  services: Nursing, Occupational Therapy, Physical Therapy, Social Work and HHA.    My clinical findings support the need for the above services because: Nurse is needed: To provide caregiver training to assist with: pain, med regimen.., Occupational Therapy Services are needed to assess and treat cognitive ability and address ADL safety due to impairment in transfers., Physical Therapy Services are needed to assess and treat the following functional impairments: gait instability. and  for community resources, HHA for bathing.    Further, I certify that my clinical findings support that this patient is homebound (i.e. absences from home require considerable and taxing effort and are for medical reasons or Religion services or infrequently or of short duration when for other reasons) because: Requires assistance of another person or specialized equipment to access medical services because patient: Is unable to operate assistive equipment on their own...    Based on the above findings. I certify that this patient is confined to the home and needs intermittent skilled nursing care, physical therapy and/or speech therapy.  The patient is under my care, and I have initiated the establishment of the plan of care.  This patient will be followed by a physician who will periodically review the plan of care.  Physician/Provider to provide follow up care: Aston Perry    Responsible Medicare certified PECOS Physician: Nolan Santoyo NP  Electronic Physician Signature  Date: 4/25/2023

## 2023-04-26 ENCOUNTER — TELEPHONE (OUTPATIENT)
Dept: INTERNAL MEDICINE | Facility: CLINIC | Age: 63
End: 2023-04-26

## 2023-04-26 ENCOUNTER — TRANSFERRED RECORDS (OUTPATIENT)
Dept: HEALTH INFORMATION MANAGEMENT | Facility: CLINIC | Age: 63
End: 2023-04-26

## 2023-04-26 NOTE — TELEPHONE ENCOUNTER
Health Call Center    Phone Message    May a detailed message be left on voicemail: yes     Reason for Call: Is Dr Perry or someone in the clinic willing to follow this patient for homecare orders. Can a covering Provider confirm? Patient is discharging from the TCU today and the nurse is planning on going to see them tomorrow 4/27. They need an answer today by end of day. Secure VM    Action Taken: Message routed to:  [facility]    Travel Screening: Not Applicable

## 2023-04-26 NOTE — TELEPHONE ENCOUNTER
Spoke with Pinky at [facility], gave her the verbal ok for home care, she will fax over the plan of care after seeing patient tomorrow 4/27/23.    Radha Hill RN on 4/26/2023 at 4:33 PM

## 2023-04-27 ENCOUNTER — MEDICAL CORRESPONDENCE (OUTPATIENT)
Dept: HEALTH INFORMATION MANAGEMENT | Facility: CLINIC | Age: 63
End: 2023-04-27

## 2023-05-01 DIAGNOSIS — Z94.0 KIDNEY TRANSPLANTED: Primary | ICD-10-CM

## 2023-05-01 RX ORDER — PREDNISONE 5 MG/1
5 TABLET ORAL DAILY
Qty: 90 TABLET | Refills: 3 | Status: SHIPPED | OUTPATIENT
Start: 2023-05-01 | End: 2024-05-30

## 2023-05-03 ENCOUNTER — MYC MEDICAL ADVICE (OUTPATIENT)
Dept: INTERNAL MEDICINE | Facility: CLINIC | Age: 63
End: 2023-05-03

## 2023-05-03 ENCOUNTER — VIRTUAL VISIT (OUTPATIENT)
Dept: FAMILY MEDICINE | Facility: CLINIC | Age: 63
End: 2023-05-03
Payer: COMMERCIAL

## 2023-05-03 DIAGNOSIS — M25.551 HIP PAIN, RIGHT: Primary | ICD-10-CM

## 2023-05-03 DIAGNOSIS — B37.0 THRUSH: ICD-10-CM

## 2023-05-03 PROCEDURE — 99213 OFFICE O/P EST LOW 20 MIN: CPT | Mod: VID | Performed by: PHYSICIAN ASSISTANT

## 2023-05-03 RX ORDER — NYSTATIN 100000/ML
500000 SUSPENSION, ORAL (FINAL DOSE FORM) ORAL 4 TIMES DAILY
Qty: 240 ML | Refills: 0 | Status: SHIPPED | OUTPATIENT
Start: 2023-05-03 | End: 2023-05-13

## 2023-05-03 ASSESSMENT — ASTHMA QUESTIONNAIRES
QUESTION_3 LAST FOUR WEEKS HOW OFTEN DID YOUR ASTHMA SYMPTOMS (WHEEZING, COUGHING, SHORTNESS OF BREATH, CHEST TIGHTNESS OR PAIN) WAKE YOU UP AT NIGHT OR EARLIER THAN USUAL IN THE MORNING: ONCE OR TWICE
QUESTION_5 LAST FOUR WEEKS HOW WOULD YOU RATE YOUR ASTHMA CONTROL: WELL CONTROLLED
QUESTION_2 LAST FOUR WEEKS HOW OFTEN HAVE YOU HAD SHORTNESS OF BREATH: THREE TO SIX TIMES A WEEK
ACT_TOTALSCORE: 20
QUESTION_4 LAST FOUR WEEKS HOW OFTEN HAVE YOU USED YOUR RESCUE INHALER OR NEBULIZER MEDICATION (SUCH AS ALBUTEROL): NOT AT ALL
QUESTION_1 LAST FOUR WEEKS HOW MUCH OF THE TIME DID YOUR ASTHMA KEEP YOU FROM GETTING AS MUCH DONE AT WORK, SCHOOL OR AT HOME: A LITTLE OF THE TIME
ACT_TOTALSCORE: 20

## 2023-05-03 ASSESSMENT — ANXIETY QUESTIONNAIRES
6. BECOMING EASILY ANNOYED OR IRRITABLE: SEVERAL DAYS
8. IF YOU CHECKED OFF ANY PROBLEMS, HOW DIFFICULT HAVE THESE MADE IT FOR YOU TO DO YOUR WORK, TAKE CARE OF THINGS AT HOME, OR GET ALONG WITH OTHER PEOPLE?: VERY DIFFICULT
5. BEING SO RESTLESS THAT IT IS HARD TO SIT STILL: SEVERAL DAYS
GAD7 TOTAL SCORE: 10
GAD7 TOTAL SCORE: 10
1. FEELING NERVOUS, ANXIOUS, OR ON EDGE: SEVERAL DAYS
IF YOU CHECKED OFF ANY PROBLEMS ON THIS QUESTIONNAIRE, HOW DIFFICULT HAVE THESE PROBLEMS MADE IT FOR YOU TO DO YOUR WORK, TAKE CARE OF THINGS AT HOME, OR GET ALONG WITH OTHER PEOPLE: VERY DIFFICULT
4. TROUBLE RELAXING: MORE THAN HALF THE DAYS
7. FEELING AFRAID AS IF SOMETHING AWFUL MIGHT HAPPEN: MORE THAN HALF THE DAYS
3. WORRYING TOO MUCH ABOUT DIFFERENT THINGS: MORE THAN HALF THE DAYS
7. FEELING AFRAID AS IF SOMETHING AWFUL MIGHT HAPPEN: MORE THAN HALF THE DAYS
2. NOT BEING ABLE TO STOP OR CONTROL WORRYING: SEVERAL DAYS

## 2023-05-03 NOTE — PROGRESS NOTES
"Jerad is a 63 year old who is being evaluated via a billable video visit.      How would you like to obtain your AVS? MyChart  If the video visit is dropped, the invitation should be resent by: Send to e-mail at: [email or web address]  Will anyone else be joining your video visit? No    Assessment & Plan   Problem List Items Addressed This Visit        Nervous and Auditory    Hip pain, right - Primary    Relevant Medications    naloxone (NARCAN) 4 MG/0.1ML nasal spray   Other Visit Diagnoses     Thrush        Relevant Medications    nystatin (MYCOSTATIN) 572827 UNIT/ML suspension         Tramadol 50 mg 2-3 times a day, alternate and try to substitute with Tylenol as much as possible  Narcan was prescribed   Nystatin swish and swallow 4 times a day     16 minutes spent by me on the date of the encounter doing chart review, history and exam, documentation and further activities per the note     MED REC REQUIRED  Post Medication Reconciliation Status:   BMI:   Estimated body mass index is 27.89 kg/m  as calculated from the following:    Height as of 4/25/23: 1.727 m (5' 8\").    Weight as of 4/25/23: 83.2 kg (183 lb 6.4 oz).           Latasha Nickerson PA-C  [facility] MOHAN Mars is a 63 year old, presenting for the following health issues:  Hospital F/U    hospitals     Hospital Follow-up Visit:    Hospital/Nursing Home/IP Rehab Facility: [facility]  Date of Admission: 4/8/2023  Date of Discharge: 4/10/2023 went to TCU and discharged 4/25/23  Reason(s) for Admission: R Hip pain    Was your hospitalization related to COVID-19? No   Problems taking medications regularly:  None  Medication changes since discharge: None  Problems adhering to non-medication therapy:  Ice and Tylenol, breathing exercises to relax    Summary of hospitalization:  [facility] discharge summary reviewed  Diagnostic Tests/Treatments reviewed.  " Follow up needed: Orthopedics  Other Healthcare Providers Involved in Patient s Care:         Surgical follow-up appointment - 6/19/23  Update since discharge: worsened. Plan of care communicated with patient     Of Note: Discuss alternative to oxycodone. Patient feels that he is getting addicted. He stopped Oxy and has night sweats, anxiety, can't sleep. Patient wants a pain control, but not as strong as Oxy to prevent further addiction development. Tylenol is mildly helpful for pain.     thrush      Duration: 3 days    Description (location/character/radiation): white coating on the inside of the cheeks and lower lip    Intensity:  mild    Accompanying signs and symptoms: none    History (similar episodes/previous evaluation): on immunosuppressants due to kidney transplant and gets thrush frequently    Precipitating or alleviating factors: None    Therapies tried and outcome: Nystatin mouth wash works well            Review of Systems   Constitutional, HEENT, cardiovascular, pulmonary, gi and gu systems are negative, except as otherwise noted.      Objective           Vitals:  No vitals were obtained today due to virtual visit.    Physical Exam   GENERAL: Healthy, alert and no distress  EYES: Eyes grossly normal to inspection.  No discharge or erythema, or obvious scleral/conjunctival abnormalities.  HENT: white coating on the inside of the lower lip and buccal mucosa  RESP: No audible wheeze, cough, or visible cyanosis.  No visible retractions or increased work of breathing.    SKIN: Visible skin clear. No significant rash, abnormal pigmentation or lesions.  NEURO: Cranial nerves grossly intact.  Mentation and speech appropriate for age.  PSYCH: Mentation appears normal, affect normal/bright, judgement and insight intact, normal speech and appearance well-groomed.                Video-Visit Details    Type of service:  Video Visit     Originating Location (pt. Location): Home  Distant Location (provider  location):  Off-site  Platform used for Video Visit: [facility]    Answers for HPI/ROS submitted by the patient on 5/3/2023  KIM 7 TOTAL SCORE: 10

## 2023-05-03 NOTE — PATIENT INSTRUCTIONS
At [facility], we strive to deliver an exceptional experience to you, every time we see you. If you receive a survey, please complete it as we do value your feedback.  If you have MyChart, you can expect to receive results automatically within 24 hours of their completion.  Your provider will send a note interpreting your results as well.   If you do not have MyChart, you should receive your results in about a week by mail.    Your care team:                            Family Medicine Internal Medicine   MD Roque Hussein MD Shantel Branch-Fleming, MD Srinivasa Vaka, MD Katya Belousova, PARONALDO Michael CNP, MD ([facility]) Pediatric[facility]   Navarro Ellison, MD Hannah Palacios MD Amelia Massimini APRN JADON Encarnacion APRN MD Boyd Alonso MD          Clinic hours: Monday - Thursday 7 am-6 pm; Fridays 7 am-5 pm.   Urgent care: Monday - Friday 10 am- 8 pm; Saturday and Sunday 9 am-5 pm.    Clinic: [phone]       [facility]: Monday - Thursday 8 am - 7 pm; Friday 8 am - 6 pm  [facility]: [phone]

## 2023-05-05 ENCOUNTER — TELEPHONE (OUTPATIENT)
Dept: DERMATOLOGY | Facility: CLINIC | Age: 63
End: 2023-05-05

## 2023-05-05 NOTE — TELEPHONE ENCOUNTER
Prior Authorization Retail Medication Request    Medication/Dose: tacrolimus (PROTOPIC) 0.1 % external ointment  ICD code (if different than what is on RX):  L30.4  Previously Tried and Failed:  See Chart  Rationale:      Insurance Name:  nishi  Insurance ID:  [id number]      Key: [id number]

## 2023-05-09 ENCOUNTER — DOCUMENTATION ONLY (OUTPATIENT)
Dept: INTERNAL MEDICINE | Facility: CLINIC | Age: 63
End: 2023-05-09

## 2023-05-09 NOTE — PROGRESS NOTES
Type of Form Received:     Form Received (Date) 5/9/23   Form Filled out Yes 5/12/23   Placed in provider folder Yes

## 2023-05-10 ENCOUNTER — TELEPHONE (OUTPATIENT)
Dept: DERMATOLOGY | Facility: CLINIC | Age: 63
End: 2023-05-10

## 2023-05-10 NOTE — TELEPHONE ENCOUNTER
Prior Authorization Retail Medication Request    Medication/Dose: tacrolimus (PROTOPIC) 0.1 % external ointment  ICD code (if different than what is on RX):  See chart   Previously Tried and Failed:  See chart  Rationale:  See chart    Insurance Name:  [facility]  Insurance ID:  [id number]

## 2023-05-10 NOTE — TELEPHONE ENCOUNTER
[facility] Call Center    Phone Message    May a detailed message be left on voicemail: yes     Reason for Call: Medication Question or concern regarding medication   Prescription Clarification  Name of Medication: tacrolimus (PROTOPIC) 0.1 % external ointment  Prescribing Provider: Dr. VITALIY Santos    Pharmacy: [facility] - [place], [place] - [address]   What on the order needs clarification?     Patient has new insurance and needs a new PA for med          Action Taken: Message routed to:  [facility]    Travel Screening: Not Applicable

## 2023-05-11 DIAGNOSIS — Z53.9 DIAGNOSIS NOT YET DEFINED: Primary | ICD-10-CM

## 2023-05-11 PROCEDURE — G0180 MD CERTIFICATION HHA PATIENT: HCPCS | Performed by: INTERNAL MEDICINE

## 2023-05-12 NOTE — TELEPHONE ENCOUNTER
PRIOR AUTHORIZATION DENIED    Medication: tacrolimus (PROTOPIC) 0.1 % external ointment    Denial Date: 5/12/2023    Denial Rational:             Appeal Information: This medication was denied. If physician would like to appeal because patient has contraindication or allergy to covered medication please write letter of medical necessity and route back to PA team to initiate.  If no further action is needed please close encounter thank you.

## 2023-05-12 NOTE — TELEPHONE ENCOUNTER
Central Prior Authorization Team   Phone: [phone]    PA Initiation    Medication: tacrolimus (PROTOPIC) 0.1 % external ointment  Insurance Company: [employer] - Phone [phone] Fax [phone]  Pharmacy Filling the Rx: [facility] - [place], [place] - [address]  Filling Pharmacy Phone: [phone]  Filling Pharmacy Fax:    Start Date: 5/12/2023

## 2023-05-16 ENCOUNTER — TRANSFERRED RECORDS (OUTPATIENT)
Dept: HEALTH INFORMATION MANAGEMENT | Facility: CLINIC | Age: 63
End: 2023-05-16

## 2023-05-17 ENCOUNTER — OFFICE VISIT (OUTPATIENT)
Dept: SLEEP MEDICINE | Facility: CLINIC | Age: 63
End: 2023-05-17
Payer: COMMERCIAL

## 2023-05-17 VITALS
SYSTOLIC BLOOD PRESSURE: 138 MMHG | OXYGEN SATURATION: 98 % | BODY MASS INDEX: 27.43 KG/M2 | HEART RATE: 73 BPM | HEIGHT: 68 IN | WEIGHT: 181 LBS | DIASTOLIC BLOOD PRESSURE: 97 MMHG

## 2023-05-17 DIAGNOSIS — G47.33 OSA (OBSTRUCTIVE SLEEP APNEA): Primary | ICD-10-CM

## 2023-05-17 PROCEDURE — 99214 OFFICE O/P EST MOD 30 MIN: CPT | Performed by: INTERNAL MEDICINE

## 2023-05-17 ASSESSMENT — SLEEP AND FATIGUE QUESTIONNAIRES
HOW LIKELY ARE YOU TO NOD OFF OR FALL ASLEEP WHILE SITTING INACTIVE IN A PUBLIC PLACE: WOULD NEVER DOZE
HOW LIKELY ARE YOU TO NOD OFF OR FALL ASLEEP WHILE WATCHING TV: SLIGHT CHANCE OF DOZING
HOW LIKELY ARE YOU TO NOD OFF OR FALL ASLEEP WHILE SITTING AND READING: SLIGHT CHANCE OF DOZING
HOW LIKELY ARE YOU TO NOD OFF OR FALL ASLEEP WHILE LYING DOWN TO REST IN THE AFTERNOON WHEN CIRCUMSTANCES PERMIT: MODERATE CHANCE OF DOZING
HOW LIKELY ARE YOU TO NOD OFF OR FALL ASLEEP WHEN YOU ARE A PASSENGER IN A CAR FOR AN HOUR WITHOUT A BREAK: SLIGHT CHANCE OF DOZING
HOW LIKELY ARE YOU TO NOD OFF OR FALL ASLEEP IN A CAR, WHILE STOPPED FOR A FEW MINUTES IN TRAFFIC: WOULD NEVER DOZE
HOW LIKELY ARE YOU TO NOD OFF OR FALL ASLEEP WHILE SITTING QUIETLY AFTER LUNCH WITHOUT ALCOHOL: WOULD NEVER DOZE
HOW LIKELY ARE YOU TO NOD OFF OR FALL ASLEEP WHILE SITTING AND TALKING TO SOMEONE: WOULD NEVER DOZE

## 2023-05-17 NOTE — PROGRESS NOTES
[facility]   Outpatient Sleep Medicine Follow-up Visit  May 17, 2023    Name: Jerad Ross MRN# [id number]   Age: 63 year old YOB: 1960     Date of Consultation: May 17, 2023  Consultation is requested by: No referring provider defined for this encounter.  Primary care provider: Aston Perry           Assessment and Plan:     Sleep Diagnoses:    Severe obstructive sleep apnea with hypoxemia    Irregular sleep pattern    Comorbid conditions:    Chronic shoulder pain    Heart disease    Hypertension    Ischemic heart disease status post bypass graft    Normal ejection fraction and negative stress echo 2020    Status post renal transplant on mycophenolate    Mood disturbance with depression and anxiety regarding chronic health challenges    Summary Recommendations:    Referral to sleep medicine dentistry for oral device with return to clinic in 3 months for home sleep test with fully adjusted device    Referral to [facility] for cognitive behavioral therapy for anxiety and adjustment to health challenges         History of Present Illness:     Jerad Ross is a 63 year old male with severe obstructive sleep apnea and associated hypoxemia currently not treated-initially seen 5 months ago.  He has bothered by chronic insomnia and more recently in the setting of chronic hip pain as well as untreated severe obstructive sleep apnea with hypoxemia as noted below.  He acknowledges and understands the risk of untreated sleep apnea setting of vascular disease and is interested in achieving effective therapy even if it is only partially effective in controlling his disease as long as it is not extremely burdensome.  He has had negative experience in the past with CPAP therapy and would like to be evaluated for mandibular advancement device.      REVIOUS SLEEP TESTING: PSG supine only; 203 min 190#  DATE: 11/2/2011  AHI 17 (REM  83) without hypoxemia  DATE:  4/2/2023  AHI 57.5 with 33.8 minutes of hypoxemia less than or equal to 89%; arousal index 55       Most Recent SCALES:    EPWORTH SLEEPINESS SCALE WITHIN 1 YEAR    Sitting and reading 0    Watching TV 1    Sitting, inactive in a public place (theatre or mtg.) 0     As a passenger in a car 0    Lying down to rest in the afternoon when circumstance permit 2    Sitting and talking to someone 0    Sitting quietly after lunch without alcohol 0    In a car, while stopped for a few minutes in traffic 0    TOTAL SCORE 3    Normal < 11         0--none    1--mild    2--moderate  3--severe      INSOMNIA SEVERITY INDEX WITHIN 1 YEAR   Difficulty falling asleep 3   Difficult staying asleep 3   Problems waking up to early 3   How SATISFIED/DISSATISFIED are you with your CURRENT sleep pattern? 3   How NOTICEABLE to others do you think your sleep pattern is in terms of your quality of life? 3   How WORRIED/DISTRESSED are you about your current sleep pattern? 3   To what extent do you consider your sleep problem to INTERFERE with your daily fuctioning(e.g. daytime fatigue, mood, ability to function at work/daily chores, concentration, mood,etc.) CURRENTLY? 3   INSOMNIA SEVERITY INDEX TOTAL SCORE 21    --absence of insomnia (0-7); sub-threshold insomnia (8-14); moderate insomnia (15-21); and severe insomnia (22-28)--             Medications:     Current Outpatient Medications   Medication Sig     acetaminophen (TYLENOL) 325 MG tablet Take 1-2 tablets by mouth 4 times daily     albuterol (PROAIR HFA) 108 (90 Base) MCG/ACT inhaler Inhale 2 puffs into the lungs every 6 hours as needed for shortness of breath / dyspnea or wheezing     aspirin 81 MG tablet Take 81 mg by mouth daily      budesonide (PULMICORT FLEXHALER) 90 MCG/ACT inhaler Inhale 2 puffs into the lungs 2 times daily as needed (PRN)     calcium citrate-vitamin D (CITRACAL) 315-200 MG-UNIT TABS Take 1 tablet by mouth daily.     entecavir (BARACLUDE) 0.5 MG tablet Take 1  tablet (0.5 mg) by mouth daily APPT NEEDED FOR FURTHER REFILLS     FLUoxetine (PROZAC) 40 MG capsule Take 1 capsule (40 mg) by mouth daily     fluticasone-salmeterol (ADVAIR) 250-50 MCG/ACT inhaler Inhale 1 puff into the lungs 2 times daily as needed (PRN)     furosemide (LASIX) 20 MG tablet Take 1 tablet (20 mg) by mouth daily as needed (fluid retention)     isosorbide mononitrate (IMDUR) 30 MG 24 hr tablet TAKE 1 TABLET(30 MG) BY MOUTH DAILY     latanoprost (XALATAN) 0.005 % ophthalmic solution Place 1 drop into both eyes At Bedtime     Lidocaine (LIDOCARE) 4 % Patch Place 1 patch onto the skin every 24 hours To prevent lidocaine toxicity, patient should be patch free for 12 hrs daily.     metoprolol tartrate (LOPRESSOR) 25 MG tablet TAKE 1/2 TABLET(12.5 MG) BY MOUTH TWICE DAILY     Multiple Vitamins-Iron (ONE DAILY MULTIVITAMIN/IRON) TABS Take 1 tablet by mouth daily     mycophenolate (GENERIC EQUIVALENT) 250 MG capsule Take 3 capsules (750 mg) by mouth 2 times daily     naloxone (NARCAN) 4 MG/0.1ML nasal spray Spray 1 spray (4 mg) into one nostril alternating nostrils as needed for opioid reversal every 2-3 minutes until assistance arrives     nitroGLYcerin (NITROSTAT) 0.4 MG sublingual tablet Place 1 tablet (0.4 mg) under the tongue every 5 minutes as needed for chest pain     Omega-3 Fatty Acids (OMEGA 3 PO) Take 1 capsule by mouth daily Dose unknown     ondansetron (ZOFRAN ODT) 4 MG ODT tab Take 1 tablet (4 mg) by mouth every 6 hours as needed for nausea or vomiting     oxyCODONE (ROXICODONE) 5 MG tablet Take 1-2 tablets (5-10 mg) by mouth every 4 hours as needed for moderate pain or severe pain (Patient not taking: Reported on 5/3/2023)     pantoprazole (PROTONIX) 40 MG EC tablet Take 1 tablet (40 mg) by mouth daily     polyethylene glycol (MIRALAX) 17 g packet Take 17 g by mouth daily as needed      potassium chloride ER (KLOR-CON M) 20 MEQ CR tablet Take 1 tablet (20 mEq) by mouth daily     predniSONE  (DELTASONE) 5 MG tablet Take 1 tablet (5 mg) by mouth daily     rosuvastatin (CRESTOR) 20 MG tablet TAKE 1 TABLET(20 MG) BY MOUTH DAILY (Patient taking differently: Take 20 mg by mouth At Bedtime)     senna-docusate (SENOKOT-S/PERICOLACE) 8.6-50 MG tablet Take 2 tablets by mouth 2 times daily as needed for constipation     tacrolimus (PROTOPIC) 0.1 % external ointment Apply topically 2 times daily     No current facility-administered medications for this visit.        Allergies   Allergen Reactions     Penicillins Shortness Of Breath and Hives     Keflex [Cephalexin Hcl] Other (See Comments)     Pt could not recall reaction     Tetracycline Other (See Comments)     Patient could not recall reaction     Sulfa Antibiotics Rash            Problem List:     Patient Active Problem List   Diagnosis     BCC (basal cell carcinoma), trunk     JULIANE (obstructive sleep apnea)     HTN, kidney transplant related     Coronary arteriosclerosis due to lipid rich plaque     NSTEMI (non-ST elevated myocardial infarction) (H)     Depression     Diverticulosis     Hemorrhoids     Kidney replaced by transplant     Basal cell carcinoma     Squamous cell carcinoma     Dyslipidemia     CRP elevated     Glaucoma     AION (acute ischemic optic neuropathy)     Paracentral scotoma     Hip pain     Inflamed seborrheic keratosis     Intertrigo     Chronic hepatitis B (H)     Immunosuppression (H)     Hypogonadism in male     GERD (gastroesophageal reflux disease)     Aftercare following organ transplant     Acute midline low back pain without sciatica     Septic bursitis     Impaired mobility     S/P CABG (coronary artery bypass graft)     Abnormal cardiovascular stress test     HTN (hypertension)     End stage renal disease (H)     Hip pain, right     Avascular necrosis of bones of both hips (H)            Past Medical History:     Does not need 02 supplement at night   Past Medical History:   Diagnosis Date     Acute midline low back pain  without sciatica      AION (acute ischemic optic neuropathy)      AION (acute ischemic optic neuropathy)      Anemia in chronic renal disease      Anemia in chronic renal disease      Avascular necrosis of bones of both hips (H)     s/p bilateral hip replacements     Avascular necrosis of bones of both hips (H)     s/p bilateral hip replacements     Basal cell carcinoma      Basal cell carcinoma      Chronic hepatitis B (H)      Chronic hepatitis B (H)      Clostridium difficile colitis      Clostridium difficile colitis      Coronary artery disease involving native coronary artery of native heart without angina pectoris 6/17/2014    Coronary angiogram 6/17/14: Severe distal 3-vessel disease involving small vessels, not amenable to PCI or CABG.     Coronary artery disease involving native coronary artery of native heart without angina pectoris 06/17/2014    Coronary angiogram 6/17/14: Severe distal 3-vessel disease involving small vessels, not amenable to PCI or CABG     CRP elevated      Depression      Depression      Diverticulosis      Diverticulosis      Dyslipidemia      Dyslipidemia      Elevated C-reactive protein (CRP)      FSGS (focal segmental glomerulosclerosis)      FSGS (focal segmental glomerulosclerosis)      Gastric ulcer with hemorrhage 2/12/12     Gastric ulcer with hemorrhage 02/12/2012     GERD (gastroesophageal reflux disease)      GERD (gastroesophageal reflux disease)      Glaucoma     OHTN     Glaucoma      Hemorrhoids      Hemorrhoids      HTN (hypertension)      Hyperlipidemia      Hypertension secondary to other renal disorders      Hypogonadism in male      Hypogonadism in male      Immunosuppressed status (H)      Kidney replaced by transplant     focal glomerulosclerosis      Kidney transplanted     focal glomerulosclerosis      NSTEMI (non-ST elevated myocardial infarction) (H) 6/17/2014     NSTEMI (non-ST elevated myocardial infarction) (H) 06/17/2014     JULIANE (obstructive sleep  apnea)     Doesn't use CPAP     JULIANE (obstructive sleep apnea)      Paracentral scotoma     LE     Paracentral scotoma     LE     Secondary renal hyperparathyroidism (H)      Secondary renal hyperparathyroidism (H)      Septic bursitis      Squamous cell carcinoma      Squamous cell carcinoma              Past Surgical History:    No h/o  upper airway surgery  Past Surgical History:   Procedure Laterality Date     APPENDECTOMY       APPENDECTOMY       Cardiac Bypass surgery  06/08/2020    [facility]      CATARACT EXTRACTION EXTRACAPSULAR W/ INTRAOCULAR LENS IMPLANTATION Bilateral 4-20-10, 6-1-10     CATARACT IOL, RT/LT  4/19/2000    RE     CATARACT IOL, RT/LT  6/1/2000    LE     COLECTOMY PARTIAL  1983     10 cm, diverticulitis      COLECTOMY SUBTOTAL  1983    10 cm, diverticulitis     COLONOSCOPY  2/13/2012    Procedure:COLONOSCOPY; Surgeon:SLOAN GALLARDO; Location: GI     COLONOSCOPY N/A 1/22/2020    Procedure: Colonoscopy, With Polypectomy And Biopsy;  Surgeon: Aston Kiran MD;  Location:  GI     COLONOSCOPY  02/13/2012     CV CORONARY ANGIOGRAM N/A 6/2/2020    Procedure: Coronary Angiogram;  Surgeon: Alona Florentino MD;  Location: [facility];  Service: Cardiology     CV CORONARY ANGIOGRAM N/A 9/20/2021    Procedure: Coronary Angiogram;  Surgeon: Adam Agudelo MD;  Location: [facility] CV     CV LEFT HEART CATHETERIZATION WITHOUT LEFT VENTRICULOGRAM Left 6/2/2020    Procedure: Left Heart Catheterization Without Left Ventriculogram;  Surgeon: Alona Florentino MD;  Location: [facility];  Service: Cardiology     CV SUPRAVALVULAR AORTOGRAM N/A 9/20/2021    Procedure: Supravalvular Aortagram;  Surgeon: Adam Agudelo MD;  Location: [facility] CV     ESOPHAGOSCOPY, GASTROSCOPY, DUODENOSCOPY (EGD), COMBINED  2/13/2012    Procedure:COMBINED ESOPHAGOSCOPY, GASTROSCOPY, DUODENOSCOPY (EGD); Surgeon:SLOAN GALLARDO; Location:[facility]      ESOPHAGOSCOPY, GASTROSCOPY, DUODENOSCOPY (EGD), COMBINED  02/13/2012     EXTRACAPSULAR CATARACT EXTRATION WITH INTRAOCULAR LENS IMPLANT  4-20-10, 6-1-10    Rt, Lt     HERNIA REPAIR  1995    Lt inguinal     HERNIA REPAIR  1995    Lt inguinal     HIP SURGERY      1981, bilat MITZI, revised 2001, 2005     HIP SURGERY  1981    bilat MITZI, revised 2001, 2005     IR FINE NEEDLE ASPIRATION W ULTRASOUND  4/10/2023     KIDNEY SURGERY  1978 and 1993    transplant     KIDNEY SURGERY  1978, 1993    transplant     KNEE SURGERY  1983, 1987    bilat TKA     KNEE SURGERY Bilateral 1983, 1987     MOHS MICROGRAPHIC PROCEDURE       MOHS MICROGRAPHIC PROCEDURE       OTHER SURGICAL HISTORY      kidney [id number], 1993     PHACOEMULSIFICATION CLEAR CORNEA WITH STANDARD INTRAOCULAR LENS IMPLANT Right 04/19/2000     PHACOEMULSIFICATION CLEAR CORNEA WITH STANDARD INTRAOCULAR LENS IMPLANT Left 06/01/2000     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   GENERAL: Healthy, alert and no distress  EYES: Eyes grossly normal to inspection.  No discharge or erythema, or obvious scleral/conjunctival abnormalities.  RESP: No audible wheeze, cough, or visible cyanosis.  No visible retractions or increased work of breathing.    SKIN: Visible skin clear. No significant rash, abnormal pigmentation or lesions.  NEURO: Cranial nerves grossly intact.  Mentation and speech appropriate for age.  PSYCH: Mentation appears normal, affect normal/bright, judgement and insight intact, normal speech and appearance well-groomed.        Copy to: Aston Perry MD 5/17/2023         Total time spent reviewing medical records including previous testing and interpretation as well as direct patient contact and documentation on  this date: 40 minutes

## 2023-05-17 NOTE — PATIENT INSTRUCTIONS
"Medicare and Medicaid patients starting on CPAP or biPAP on or after 5/12/2023  Medicare patients should schedule an IN PERSON CLINIC appointment to provide your CPAP/biPAP usage data to a provider within 90 days of starting CPAP  Virtual visits are no longer allowed for this visit for Medicare                MY TREATMENT INFORMATION FOR SLEEP APNEA-  Jerad Ross    DOCTOR : BETH HUNT MD    Am I having a sleep study at a sleep center?  --->Due to normal delays, you will be contacted within 2-4 weeks to schedule    Am I having a home sleep study?  --->Watch the video for the device you are using:    -/drop off device-   https://www.EchoSign.com/watch?v=yGGFBdELGhk    -Disposable device sent out require phone/computer application-   https://www.EchoSign.com/watch?v=BCce_vbiwxE      Frequently asked questions:  1. What is Obstructive Sleep Apnea (JULIANE)? JULIANE is the most common type of sleep apnea. Apnea means, \"without breath.\"  Apnea is most often caused by narrowing or collapse of the upper airway as muscles relax during sleep.   Almost everyone has occasional apneas. Most people with sleep apnea have had brief interruptions at night frequently for many years.  The severity of sleep apnea is related to how frequent and severe the events are.   2. What are the consequences of JULIANE? Symptoms include: feeling sleepy during the day, snoring loudly, gasping or stopping of breathing, trouble sleeping, and occasionally morning headaches or heartburn at night.  Sleepiness can be serious and even increase the risk of falling asleep while driving. Other health consequences may include development of high blood pressure and other cardiovascular disease in persons who are susceptible. Untreated JULIANE  can contribute to heart disease, stroke and diabetes.   3. What are the treatment options? In most situations, sleep apnea is a lifelong disease that must be managed with daily therapy. Medications are not effective for sleep " apnea and surgery is generally not considered until other therapies have been tried. Your treatment is your choice . Continuous Positive Airway (CPAP) works right away and is the therapy that is effective in nearly everyone. An oral device to hold your jaw forward is usually the next most reliable option. Other options include postioning devices (to keep you off your back), weight loss, and surgery including a tongue pacing device. There is more detail about some of these options below.  4. Are my sleep studies covered by insurance? Although we will request verification of coverage, we advise you also check in advance of the study to ensure there is coverage.    Important tips for those choosing CPAP and similar devices  For new devices, sign up for device KIEL to monitor your device for your followup visits  We encourage you to utilize the [employer] kiel or website (myAir web (resmed.com) ) to monitor your therapy progress and share the data with your healthcare team when you discuss your sleep apnea.                                                    Know your equipment:  CPAP is continuous positive airway pressure that prevents obstructive sleep apnea by keeping the throat from collapsing while you are sleeping. In most cases, the device is  smart  and can slowly self-adjusts if your throat collapses and keeps a record every day of how well you are treated-this information is available to you and your care team.  BPAP is bilevel positive airway pressure that keeps your throat open and also assists each breath with a pressure boost to maintain adequate breathing.  Special kinds of BPAP are used in patients who have inadequate breathing from lung or heart disease. In most cases, the device is  smart  and can slowly self-adjusts to assist breathing. Like CPAP, the device keeps a record of how well you are treated.  Your mask is your connection to the device. You get to choose what feels most comfortable and the  staff will help to make sure if fits. Here: are some examples of the different masks that are available:       Key points to remember on your journey with sleep apnea:  Sleep study.  PAP devices often need to be adjusted during a sleep study to show that they are effective and adjusted right.  Good tips to remember: Try wearing just the mask during a quiet time during the day so your body adapts to wearing it. A humidifier is recommended for comfort in most cases to prevent drying of your nose and throat. Allergy medication from your provider may help you if you are having nasal congestion.  Getting settled-in. It takes more than one night for most of us to get used to wearing a mask. Try wearing just the mask during a quiet time during the day so your body adapts to wearing it. A humidifier is recommended for comfort in most cases. Our team will work with you carefully on the first day and will be in contact within 4 days and again at 2 and 4 weeks for advice and remote device adjustments. Your therapy is evaluated by the device each day.   Use it every night. The more you are able to sleep naturally for 7-8 hours, the more likely you will have good sleep and to prevent health risks or symptoms from sleep apnea. Even if you use it 4 hours it helps. Occasionally all of us are unable to use a medical therapy, in sleep apnea, it is not dangerous to miss one night.   Communicate. Call our skilled team on the number provided on the first day if your visit for problems that make it difficult to wear the device. Over 2 out of 3 patients can learn to wear the device long-term with help from our team. Remember to call our team or your sleep providers if you are unable to wear the device as we may have other solutions for those who cannot adapt to mask CPAP therapy. It is recommended that you sleep your sleep provider within the first 3 months and yearly after that if you are not having problems.   Use it for your health. We  encourage use of CPAP masks during daytime quiet periods to allow your face and brain to adapt to the sensation of CPAP so that it will be a more natural sensation to awaken to at night or during naps. This can be very useful during the first few weeks or months of adapting to CPAP though it does not help medically to wear CPAP during wakefulness and  should not be used as a strategy just to meet guidelines.  Take care of your equipment. Make sure you clean your mask and tubing using directions every day and that your filter and mask are replaced as recommended or if they are not working.     BESIDES CPAP, WHAT OTHER THERAPIES ARE THERE?    Positioning Device  Positioning devices are generally used when sleep apnea is mild and only occurs on your back.This example shows a pillow that straps around the waist. It may be appropriate for those whose sleep study shows milder sleep apnea that occurs primarily when lying flat on one's back. Preliminary studies have shown benefit but effectiveness at home may need to be verified by a home sleep test. These devices are generally not covered by medical insurance.  Examples of devices that maintain sleeping on the back to prevent snoring and mild sleep apnea.    Belt type body positioner  http://QuickGifts/    Electronic reminder  http://nightshifttherapy.com/            Oral Appliance  What is oral appliance therapy?  An oral appliance device fits on your teeth at night like a retainer used after having braces. The device is made by a specialized dentist and requires several visits over 1-2 months before a manufactured device is made to fit your teeth and is adjusted to prevent your sleep apnea. Once an oral device is working properly, snoring should be improved. A home sleep test may be recommended at that time if to determine whether the sleep apnea is adequately treated.       [facility]  Search engine:  https://[facility].aad.org/members/directory/search_bootstrap.php?org_id=ADSM&                   ACCEPT MEDICARE  Joel ArvizuBAM burnett  [address], [address], [place] [address] [phone]; [phone] (fax)  [employer]    Ambrocio OsborneBAM, MS   [facility]   [address]   [address]   [place] [address]   Appointments: [phone]   Fax: [phone]     Robert Paredes DDS   [address]  [address]   [place] [address]  Appointments [phone]        Some things to remember:  -Oral devices are often, but not always, covered by your medical insurance. Be sure to check with your insurance provider.   -If you are referred for oral therapy, you will be given a list of specialized dentists to consider or you may choose to visit the Web site of the American Academy of Dental Sleep Medicine  -Oral devices are less likely to work if you have severe sleep apnea or are extremely overweight.     More detailed information  An oral appliance is a small acrylic device that fits over the upper and lower teeth  (similar to a retainer or a mouth guard). This device slightly moves jaw forward, which moves the base of the tongue forward, opens the airway, improves breathing for effective treat snoring and obstructive sleep apnea in perhaps 7 out of 10 people .  The best working devices are custom-made by a dental device  after a mold is made of the teeth 1, 2, 3.  When is an oral appliance indicated?  Oral appliance therapy is recommended as a first-line treatment for patients with primary snoring, mild sleep apnea, and for patients with moderate sleep apnea who prefer appliance therapy to use of CPAP4, 5. Severity of sleep apnea is determined by sleep testing and is based on the number of respiratory events per hour of sleep.   How successful is oral appliance therapy?  The success rate of oral appliance therapy in patients with mild sleep apnea is 75-80% while in patients  with moderate sleep apnea it is 50-70%. The chance of success in patients with severe sleep apnea is 40-50%. The research also shows that oral appliances have a beneficial effect on the cardiovascular health of JULIANE patients at the same magnitude as CPAP therapy7.  Oral appliances should be a second-line treatment in cases of severe sleep apnea, but if not completely successful then a combination therapy utilizing CPAP plus oral appliance therapy may be effective. Oral appliances tend to be effective in a broad range of patients although studies show that the patients who have the highest success are females, younger patients, those with milder disease, and less severe obesity. 3, 6.   Finding a dentist that practices dental sleep medicine  Specific training is available through the American Academy of Dental Sleep Medicine for dentists interested in working in the field of sleep. To find a dentist who is educated in the field of sleep and the use of oral appliances, near you, visit the Web site of the American Academy of Dental Sleep Medicine.    References  1. Haylee, et al. Objectively measured vs self-reported compliance during oral appliance therapy for sleep-disordered breathing. Chest 2013; 144(5): 5528-2622.  2. Ambika, et al. Objective measurement of compliance during oral appliance therapy for sleep-disordered breathing. Thorax 2013; 68(1): 91-96.  3. Lissett, et al. Mandibular advancement devices in 620 men and women with JULIANE and snoring: tolerability and predictors of treatment success. Chest 2004; 125: 7594-9931.  4. Arnold, et al. Oral appliances for snoring and JULIANE: a review. Sleep 2006; 29: 244-262.  5. Vernon et al. Oral appliance treatment for JULIANE: an update. J Clin Sleep Med 2014; 10(2): 215-227.  6. Elen, et al. Predictors of OSAH treatment outcome. J Dent Res 2007; 86: 5757-7454.      Weight Loss:    Weight loss is a long-term strategy that may improve sleep apnea in some  patients.    Weight management is a personal decision and the decision should be based on your interest and the potential benefits.  If you are interested in exploring weight loss strategies, the following discussion covers the impact on weight loss on sleep apnea and the approaches that may be successful.    Being overweight does not necessarily mean you will have health consequences.  Those who have BMI over 35 or over 27 with existing medical conditions carries greater risk.   Weight loss decreases severity of sleep apnea in most people with obesity. For those with mild obesity who have developed snoring with weight gain, even 15-30 pound weight loss can improve and occasionally eliminate sleep apnea.  Structured and life-long dietary and health habits are necessary to lose weight and keep healthier weight levels.     Though there may be significant health benefits from weight loss, long-term weight loss is very difficult to achieve- studies show success with dietary management in less than 10% of people. In addition, substantial weight loss may require years of dietary control and may be difficult if patients have severe obesity. In these cases, surgical management may be considered.  Finally, older individuals who have tolerated obesity without health complications may be less likely to benefit from weight loss strategies.      [unfilled]    Surgery:    Surgery for obstructive sleep apnea is considered generally only when other therapies fail to work. Surgery may be discussed with you if you are having a difficult time tolerating CPAP and or when there is an abnormal structure that requires surgical correction.  Nose and throat surgeries often enlarge the airway to prevent collapse.  Most of these surgeries create pain for 1-2 weeks and up to half of the most common surgeries are not effective throughout life.  You should carefully discuss the benefits and drawbacks to surgery with your sleep provider and  surgeon to determine if it is the best solution for you.   More information  Surgery for JULIANE is directed at areas that are responsible for narrowing or complete obstruction of the airway during sleep.  There are a wide range of procedures available to enlarge and/or stabilize the airway to prevent blockage of breathing in the three major areas where it can occur: the palate, tongue, and nasal regions.  Successful surgical treatment depends on the accurate identification of the factors responsible for obstructive sleep apnea in each person.  A personalized approach is required because there is no single treatment that works well for everyone.  Because of anatomic variation, consultation with an examination by a sleep surgeon is a critical first step in determining what surgical options are best for each patient.  In some cases, examination during sedation may be recommended in order to guide the selection of procedures.  Patients will be counseled about risks and benefits as well as the typical recovery course after surgery. Surgery is typically not a cure for a person s JULIANE.  However, surgery will often significantly improve one s JULIANE severity (termed  success rate ).  Even in the absence of a cure, surgery will decrease the cardiovascular risk associated with OSA7; improve overall quality of life8 (sleepiness, functionality, sleep quality, etc).      Palate Procedures:  Patients with JULIANE often have narrowing of their airway in the region of their tonsils and uvula.  The goals of palate procedures are to widen the airway in this region as well as to help the tissues resist collapse.  Modern palate procedure techniques focus on tissue conservation and soft tissue rearrangement, rather than tissue removal.  Often the uvula is preserved in this procedure. Residual sleep apnea is common in patient after pharyngoplasty with an average reduction in sleep apnea events of 33%2.      Tongue Procedures:  ExamWhile patients  are awake, the muscles that surround the throat are active and keep this region open for breathing. These muscles relax during sleep, allowing the tongue and other structures to collapse and block breathing.  There are several different tongue procedures available.  Selection of a tongue base procedure depends on characteristics seen on physical exam.  Generally, procedures are aimed at removing bulky tissues in this area or preventing the back of the tongue from falling back during sleep.  Success rates for tongue surgery range from 50-62%3.    Hypoglossal Nerve Stimulation:  Hypoglossal nerve stimulation has recently received approval from the [place] Food and Drug Administration for the treatment of obstructive sleep apnea.  This is based on research showing that the system was safe and effective in treating sleep apnea6.  Results showed that the median AHI score decreased 68%, from 29.3 to 9.0. This therapy uses an implant system that senses breathing patterns and delivers mild stimulation to airway muscles, which keeps the airway open during sleep.  The system consists of three fully implanted components: a small generator (similar in size to a pacemaker), a breathing sensor, and a stimulation lead.  Using a small handheld remote, a patient turns the therapy on before bed and off upon awakening.    Candidates for this device must be greater than 18 years of age, have moderate to severe JULIANE (AHI between 15-65), BMI less than 35, have tried CPAP/oral appliance for at least 8 weeks without success, and have appropriate upper airway anatomy (determined by a sleep endoscopy performed by Dr. Brett Bianchi).    Hypoglossal Nerve Stimulation Pathway:    The sleep surgeon s office will work with the patient through the insurance prior-authorization process (including communications and appeals).    Nasal Procedures:  Nasal obstruction can interfere with nasal breathing during the day and night.  Studies have shown  that relief of nasal obstruction can improve the ability of some patients to tolerate positive airway pressure therapy for obstructive sleep apnea1.  Treatment options include medications such as nasal saline, topical corticosteroid and antihistamine sprays, and oral medications such as antihistamines or decongestants. Non-surgical treatments can include external nasal dilators for selected patients. If these are not successful by themselves, surgery can improve the nasal airway either alone or in combination with these other options.      Combination Procedures:  Combination of surgical procedures and other treatments may be recommended, particularly if patients have more than one area of narrowing or persistent positional disease.  The success rate of combination surgery ranges from 66-80%2,3.    References  Susi LAZO. The Role of the Nose in Snoring and Obstructive Sleep Apnoea: An Update.  Eur Arch Otorhinolaryngol. 2011; 268: 1365-73.   Abhishek SM; Kemal JA; Ritu JR; Pallanch JF; Hodan MB; Luda SG; Rehan FUENTES. Surgical modifications of the upper airway for obstructive sleep apnea in adults: a systematic review and meta-analysis. SLEEP 2010;33(10):9164-7519. Kp BURKS. Hypopharyngeal surgery in obstructive sleep apnea: an evidence-based medicine review.  Arch Otolaryngol Head Neck Surg. 2006 Feb;132(2):206-13.  Issac YH1, Shukri Y, Larry BECK. The efficacy of anatomically based multilevel surgery for obstructive sleep apnea. Otolaryngol Head Neck Surg. 2003 Oct;129(4):327-35.  Kp BURKS, Goldberg A. Hypopharyngeal Surgery in Obstructive Sleep Apnea: An Evidence-Based Medicine Review. Arch Otolaryngol Head Neck Surg. 2006 Feb;132(2):206-13.  Mick PJ et al. Upper-Airway Stimulation for Obstructive Sleep Apnea.  N Engl J Med. 2014 Jan 9;370(2):139-49.  Vida Y et al. Increased Incidence of Cardiovascular Disease in Middle-aged Men with Obstructive Sleep Apnea. Am J Respir Crit Care Med; 2002 166:  159-165  Amy CHE et al. Studying Life Effects and Effectiveness of Palatopharyngoplasty (SLEEP) study: Subjective Outcomes of Isolated Uvulopalatopharyngoplasty. Otolaryngol Head Neck Surg. 2011; 144: 623-631.        WHAT IF I ONLY HAVE SNORING?    Mandibular advancement devices, lateral sleep positioning, long-term weight loss and treatment of nasal allergies have been shown to improve snoring.  Exercising tongue muscles with a game (https://[employer].whereIstand.com/Loandesk/kiel/soundly-reduce-snoring/at3411629860) or stimulating the tongue during the day with a device (https://doi.org/10.3390/lpt16865581) have improved snoring in some individuals.    Remember to Drive Safe... Drive Alive     Sleep health profoundly affects your health, mood, and your safety.  Thirty three percent of the population (one in three of us) is not getting enough sleep and many have a sleep disorder. Not getting enough sleep or having an untreated / undertreated sleep condition may make us sleepy without even knowing it. In fact, our driving could be dramatically impaired due to our sleep health. As your provider, here are some things I would like you to know about driving:     Here are some warning signs for impairment and dangerous drowsy driving:              -Having been awake more than 16 hours               -Looking tired               -Eyelid drooping              -Head nodding (it could be too late at this point)              -Driving for more than 30 minutes     Some things you could do to make the driving safer if you are experiencing some drowsiness:              -Stop driving and rest              -Call for transportation              -Make sure your sleep disorder is adequately treated     Some things that have been shown NOT to work when experiencing drowsiness while driving:              -Turning on the radio              -Opening windows              -Eating any  distracting  /  entertaining  foods (e.g., sunflower seeds, candy, or any  other)              -Talking on the phone      Your decision may not only impact your life, but also the life of others. Please, remember to drive safe for yourself and all of us.             Your BMI is Body mass index is 27.52 kg/m .    What is BMI?  Body mass index (BMI) is one way to tell whether you are at a healthy weight, overweight, or obese. It measures your weight in relation to your height.  A BMI of 18.5 to 24.9 is in the healthy range. A person with a BMI of 25 to 29.9 is considered overweight, and someone with a BMI of 30 or greater is considered obese.  Another way to find out if you are at risk for health problems caused by overweight and obesity is to measure your waist. If you are a woman and your waist is more than 35 inches, or if you are a man and your waist is more than 40 inches, your risk of disease may be higher.  More than two-thirds of American adults are considered overweight or obese. Being overweight or obese increases the risk for further weight gain.  Excess weight may lead to heart disease and diabetes. Creating and following plans for healthy eating and physical activity may help you improve your health.    Methods for maintaining or losing weight.  Weight control is part of healthy lifestyle and includes exercise, emotional health, and healthy eating habits.  Careful eating habits lifelong is the mainstay of weight control.  Though there are significant health benefits from weight loss, long-term weight loss with diet alone may be very difficult to achieve- studies show long-term success with dietary management in less than 10% of people. Attaining a healthy weight may be especially difficult to achieve in those with severe obesity. In some cases, medications, devices and surgical management might be considered.    What can you do?  If you are overweight or obese and are interested in methods for weight loss, you should discuss this with your provider. In addition, we recommend  that you review healthy life styles and methods for weight loss available through the National Institutes of Health patient information sites:   http://win.niddk.nih.gov/publications/index.htm    [facility]  Search engine: https://mms.aadsm.org/members/directory/search_bootstrap.php?org_id=ADSM&   Certified in Dental Sleep Medicine    Odilon Locke  Degree: DDS  [address]  [address]  [place] [address]  Professional Phone: [phone]  Website: http://www.theScore    Jimi Shetty  Degree: DDS  [facility]  [address]  [address]  [place] [address]  Professional Phone: [phone]: [phone]    Zofia Serrato  [facility]  [address]  [place] [address]  Professional Phone: [phone]  Website: [email or web address]      Sajan Esteban  Degree: DDS  [address]  [address]  [place] [address]  Professional Phone: [phone]  Fax: [phone]    Juan Inman  Degree: DDS  [facility]  [address]  [address]  [place] [address]  Professional Phone: [phone]  Website: [email or web address]      [facility]-you should verify insurance coverage  [address]  [address]  [place] [address]  Professional Phone: [phone]  Website: [email or web address]      Maria Ines Briggs--DOES NOT ACCEPT INSURANCE  Degree: BAM--you should verify insurance coverage  [facility]  [address]  [address]  [place] [address]  Professional Phone: [phone]     Lea Parker  Degree: DDS, PhD  [facility]  [address] [place] [address] [id number] [place] [address],   [address]   [place] [address]   Appointments: [phone]   Fax: [phone]    [place] [facility]   [facility]   [address]   [address]   [place] [address]   Appointments: [phone]   Fax: [phone]                Scott Paredes  Degree: BAM  [address] [place] [address]  Professional Phone: [phone]  Fax: [phone]  Website: [email or web address]      Michael Boyd  Degree: BAM  [facility]  [address] [place] [address]    Mariona Mulet Pradera  Degree: MS BAM  [facility], Oral [facility]  [address] [place] [address]  Professional Phone: [phone]      Ruth Beaver  Degree: BAM MS  [facility]  [address]  [address]  [place] [address]  Professional Phone: [phone]    Pallavi Putnam  Degree: BAM  [facility]  [address]  [address]  [place] [address]  [place] [facility]     Srikanth Cesar  Degree: BAM  [facility]  [address] [place] [address]  Professional Phone: [phone]  Website: [email or web address]      Krunal Lugo  Degree: BAM  [facility]  [address] [place] [address]  Professional Phone: [phone]  Fax: [phone]      Danny Goldstein  Degree: BAM [facility]  [address]  [address]  [place] [address]                 ACCEPT MEDICARE  Joel Elise DDS  [address], [place] [address]  [phone]; [phone] (fax)  [employer]    Ambrocio Osborne DDS, MS   [facility]   [address]   [address]   [place] [address]   Appointments: [phone]   Fax: [phone]     Robert Paredes DDS   [address]   [place] [address]  Appointments [phone]      ADDITIONAL PROVIDERS    Néstor Malloy DDS   [employer]   [facility] [facility] [address],   [address]   [place] [address]   Appointments: [phone]   Fax: [phone]       Lam Gonzalez,  BAM, MS   [facility] [address] [address]   Appointments: [phone] Ext: [phone]  Fax: [phone]   [email or web address]

## 2023-05-18 ENCOUNTER — TELEPHONE (OUTPATIENT)
Dept: DERMATOLOGY | Facility: CLINIC | Age: 63
End: 2023-05-18

## 2023-05-18 NOTE — TELEPHONE ENCOUNTER
Prior Authorization Retail Medication Request    Medication/Dose: tacrolimus (PROTOPIC) 0.1 % external ointment  ICD code (if different than what is on RX):  Intertrigo [L30.4]   Previously Tried and Failed:  See chart  Rationale:      Insurance Name:  [facility]  Insurance ID:  [id number]

## 2023-05-19 ENCOUNTER — MEDICAL CORRESPONDENCE (OUTPATIENT)
Dept: HEALTH INFORMATION MANAGEMENT | Facility: CLINIC | Age: 63
End: 2023-05-19

## 2023-05-24 ENCOUNTER — DOCUMENTATION ONLY (OUTPATIENT)
Dept: INTERNAL MEDICINE | Facility: CLINIC | Age: 63
End: 2023-05-24

## 2023-05-24 NOTE — PROGRESS NOTES
Type of Form Received:     Form Received (Date) 5/24/23   Form Filled out Yes 5/25/23   Placed in provider folder Yes

## 2023-05-25 ENCOUNTER — MEDICAL CORRESPONDENCE (OUTPATIENT)
Dept: HEALTH INFORMATION MANAGEMENT | Facility: CLINIC | Age: 63
End: 2023-05-25

## 2023-05-31 ENCOUNTER — DOCUMENTATION ONLY (OUTPATIENT)
Dept: INTERNAL MEDICINE | Facility: CLINIC | Age: 63
End: 2023-05-31

## 2023-05-31 NOTE — PROGRESS NOTES
Type of Form Received:     Form Received (Date) 5/31/23   Form Filled out No   Placed in provider folder Yes

## 2023-06-05 ENCOUNTER — DOCUMENTATION ONLY (OUTPATIENT)
Dept: INTERNAL MEDICINE | Facility: CLINIC | Age: 63
End: 2023-06-05

## 2023-06-05 NOTE — PROGRESS NOTES
Type of Form Received:     Form Received (Date) 6/5/23   Form Filled out No   Placed in provider folder Yes

## 2023-06-06 ENCOUNTER — DOCUMENTATION ONLY (OUTPATIENT)
Dept: INTERNAL MEDICINE | Facility: CLINIC | Age: 63
End: 2023-06-06

## 2023-06-06 NOTE — PROGRESS NOTES
Type of Form Received:     Form Received (Date) 6/6/23   Form Filled out Yes 6/8/23   Placed in provider folder Yes

## 2023-06-06 NOTE — PLAN OF CARE
PATIENT INFORMATION    PATIENT NAME: Jerad Ross    : 1960    SURGEON: Dr. Mcdonough    DATE OF SURGERY: 2023    [facility]PITAL[facility]    PATIENT STATUS (inpatient, outpatient in a bed outpatient): Outpatient in a bed    ANTICIPATED DISCHARGE DATE: 2023    PCP: Aston Perry    INSURANCE: [facility]    PATIENT/FAMILY ASSESSMENT    MEDICAL    BMI: 26.11    COVID STATUS (vaccinated/boosted): vaccinated    AGE: 63    MEDICAL HISTORY (COPD, heart issues, etc): Heart issues, kidney failure    DOUCMENT SUPPORT FOR INPATIENT STATUS IF APPLICABLE:    FUNCTIONAL    CURRENTLY AMBULATION (wheelchair/walker) N/A    FALLS IN THE LAST 6 MONTHS:  0    LIVING SITUATION    STAIRS TO ENTER HOME: 4 steps into home    BATHROOM SETUP: Tub and shower, stander toilet    ANY OTHER CONCERNS:  Patient has lots of health issues and lives alone    DISCHARGE                   TCU/SNF: TCU                    ANY SPECIFIC NEEDS (, OXYGEN, BARIATRIC BED, ETC): N/A    Email ALL Care Brie ns    Communicate care plans prior to surgery for FSD/FRH to: Twyla Maldonado and Kjerstin Foss    1: [facility]    2: [facility]    3: [facility]    4: [facility]    5: [facility]              Vira Crews    Care Coordinator for Dr. Juan Mcdonough         [facility]    [address] [address]    T: [phone]    E: [email or web address]

## 2023-06-09 ENCOUNTER — TELEPHONE (OUTPATIENT)
Dept: CARDIOLOGY | Facility: CLINIC | Age: 63
End: 2023-06-09

## 2023-06-09 NOTE — TELEPHONE ENCOUNTER
[facility]    Phone Message    May a detailed message be left on voicemail: yes     Reason for Call: Other: Having some skips and flutters for the last month no chest pains but wants to see Dr prior to surgery on the 19th. Please follow up with the Pt     Action Taken: Other: cardio    Travel Screening: Not Applicable   Thank you!  [facility]

## 2023-06-09 NOTE — TELEPHONE ENCOUNTER
Called Jerad to address his concerns. He reports that over the last month he has felt more skipped beats or fluttering in his chest. He denies chest pain or shortness of breath. He admits this could be due to stress and some anxiety.  He is concerned because he has an upcoming hip surgery on 6/19.  He recently saw Dr. Lundberg in February this year.     He has been compliant with his Metoprolol tartrate- on 12.5 mg bid.    He first inquired about being seen by Dr. Lundberg, however he will be out of the clinic next week. Jerad has a pre-op physical next week and isn't sure if he really needs to see another cardiologist. He hasn't been told that he needs a cardiac risk assessment. Will route to Dr. Lundberg to see if he recommends any testing prior to hip surgery and go from there. He is comfortable with this plan. -[facility]          Dr. Lundberg,  See above. Would you recommend any testing prior to hip surgery 6/19 based on symptoms of more skipped beats and heart fluttering?  Thanks,  Mal

## 2023-06-12 ENCOUNTER — VIRTUAL VISIT (OUTPATIENT)
Dept: PSYCHIATRY | Facility: CLINIC | Age: 63
End: 2023-06-12
Attending: NURSE PRACTITIONER
Payer: COMMERCIAL

## 2023-06-12 ENCOUNTER — HOSPITAL ENCOUNTER (OUTPATIENT)
Facility: HOSPITAL | Age: 63
Setting detail: OBSERVATION
Discharge: HOME | End: 2023-06-13
Attending: EMERGENCY MEDICINE | Admitting: STUDENT IN AN ORGANIZED HEALTH CARE EDUCATION/TRAINING PROGRAM
Payer: COMMERCIAL

## 2023-06-12 ENCOUNTER — DOCUMENTATION ONLY (OUTPATIENT)
Dept: OTHER | Facility: CLINIC | Age: 63
End: 2023-06-12

## 2023-06-12 DIAGNOSIS — Z53.9 ERRONEOUS ENCOUNTER--DISREGARD: Primary | ICD-10-CM

## 2023-06-12 DIAGNOSIS — R07.9 CHEST PAIN, UNSPECIFIED TYPE: ICD-10-CM

## 2023-06-12 DIAGNOSIS — Z95.1 S/P CABG (CORONARY ARTERY BYPASS GRAFT): Primary | ICD-10-CM

## 2023-06-12 LAB
ALBUMIN SERPL BCG-MCNC: 3.8 G/DL (ref 3.5–5.2)
ALP SERPL-CCNC: 67 U/L (ref 40–129)
ALT SERPL W P-5'-P-CCNC: 24 U/L (ref 10–50)
ANION GAP SERPL CALCULATED.3IONS-SCNC: 10 MMOL/L (ref 7–15)
AST SERPL W P-5'-P-CCNC: 30 U/L (ref 10–50)
BILIRUB DIRECT SERPL-MCNC: <0.2 MG/DL (ref 0–0.3)
BILIRUB SERPL-MCNC: 0.8 MG/DL
BUN SERPL-MCNC: 9.4 MG/DL (ref 8–23)
CALCIUM SERPL-MCNC: 9.7 MG/DL (ref 8.8–10.2)
CHLORIDE SERPL-SCNC: 105 MMOL/L (ref 98–107)
CREAT SERPL-MCNC: 0.69 MG/DL (ref 0.67–1.17)
DEPRECATED HCO3 PLAS-SCNC: 27 MMOL/L (ref 22–29)
ERYTHROCYTE [DISTWIDTH] IN BLOOD BY AUTOMATED COUNT: 17.1 % (ref 10–15)
GFR SERPL CREATININE-BSD FRML MDRD: >90 ML/MIN/1.73M2
GLUCOSE SERPL-MCNC: 93 MG/DL (ref 70–99)
HCT VFR BLD AUTO: 45.1 % (ref 40–53)
HGB BLD-MCNC: 14.6 G/DL (ref 13.3–17.7)
LIPASE SERPL-CCNC: 26 U/L (ref 13–60)
MCH RBC QN AUTO: 29.3 PG (ref 26.5–33)
MCHC RBC AUTO-ENTMCNC: 32.4 G/DL (ref 31.5–36.5)
MCV RBC AUTO: 91 FL (ref 78–100)
NT-PROBNP SERPL-MCNC: 320 PG/ML (ref 0–900)
PLATELET # BLD AUTO: 261 10E3/UL (ref 150–450)
POTASSIUM SERPL-SCNC: 3.9 MMOL/L (ref 3.4–5.3)
PROT SERPL-MCNC: 6.7 G/DL (ref 6.4–8.3)
RBC # BLD AUTO: 4.98 10E6/UL (ref 4.4–5.9)
SODIUM SERPL-SCNC: 142 MMOL/L (ref 136–145)
TROPONIN T SERPL HS-MCNC: 16 NG/L
TROPONIN T SERPL HS-MCNC: 22 NG/L
WBC # BLD AUTO: 8.1 10E3/UL (ref 4–11)

## 2023-06-12 PROCEDURE — G0378 HOSPITAL OBSERVATION PER HR: HCPCS

## 2023-06-12 PROCEDURE — 250N000013 HC RX MED GY IP 250 OP 250 PS 637: Performed by: INTERNAL MEDICINE

## 2023-06-12 PROCEDURE — 250N000011 HC RX IP 250 OP 636: Performed by: EMERGENCY MEDICINE

## 2023-06-12 PROCEDURE — 99291 CRITICAL CARE FIRST HOUR: CPT | Mod: 25

## 2023-06-12 PROCEDURE — 250N000012 HC RX MED GY IP 250 OP 636 PS 637: Performed by: STUDENT IN AN ORGANIZED HEALTH CARE EDUCATION/TRAINING PROGRAM

## 2023-06-12 PROCEDURE — 93005 ELECTROCARDIOGRAM TRACING: CPT | Performed by: EMERGENCY MEDICINE

## 2023-06-12 PROCEDURE — 84484 ASSAY OF TROPONIN QUANT: CPT | Performed by: EMERGENCY MEDICINE

## 2023-06-12 PROCEDURE — 82248 BILIRUBIN DIRECT: CPT | Performed by: EMERGENCY MEDICINE

## 2023-06-12 PROCEDURE — 99222 1ST HOSP IP/OBS MODERATE 55: CPT | Performed by: INTERNAL MEDICINE

## 2023-06-12 PROCEDURE — 36415 COLL VENOUS BLD VENIPUNCTURE: CPT | Performed by: EMERGENCY MEDICINE

## 2023-06-12 PROCEDURE — 96374 THER/PROPH/DIAG INJ IV PUSH: CPT

## 2023-06-12 PROCEDURE — 83880 ASSAY OF NATRIURETIC PEPTIDE: CPT | Performed by: EMERGENCY MEDICINE

## 2023-06-12 PROCEDURE — 85027 COMPLETE CBC AUTOMATED: CPT | Performed by: EMERGENCY MEDICINE

## 2023-06-12 PROCEDURE — 250N000013 HC RX MED GY IP 250 OP 250 PS 637: Performed by: EMERGENCY MEDICINE

## 2023-06-12 PROCEDURE — 250N000013 HC RX MED GY IP 250 OP 250 PS 637: Performed by: STUDENT IN AN ORGANIZED HEALTH CARE EDUCATION/TRAINING PROGRAM

## 2023-06-12 PROCEDURE — 83690 ASSAY OF LIPASE: CPT | Performed by: EMERGENCY MEDICINE

## 2023-06-12 PROCEDURE — 80053 COMPREHEN METABOLIC PANEL: CPT | Performed by: EMERGENCY MEDICINE

## 2023-06-12 PROCEDURE — 99222 1ST HOSP IP/OBS MODERATE 55: CPT | Performed by: STUDENT IN AN ORGANIZED HEALTH CARE EDUCATION/TRAINING PROGRAM

## 2023-06-12 RX ORDER — NITROGLYCERIN 0.4 MG/1
0.4 TABLET SUBLINGUAL ONCE
Status: COMPLETED | OUTPATIENT
Start: 2023-06-12 | End: 2023-06-12

## 2023-06-12 RX ORDER — ASPIRIN 81 MG/1
81 TABLET ORAL DAILY
Status: DISCONTINUED | OUTPATIENT
Start: 2023-06-12 | End: 2023-06-13 | Stop reason: HOSPADM

## 2023-06-12 RX ORDER — TACROLIMUS 1 MG/G
OINTMENT TOPICAL 2 TIMES DAILY PRN
Status: DISCONTINUED | OUTPATIENT
Start: 2023-06-12 | End: 2023-06-13 | Stop reason: HOSPADM

## 2023-06-12 RX ORDER — ISOSORBIDE MONONITRATE 30 MG/1
60 TABLET, EXTENDED RELEASE ORAL DAILY
Status: DISCONTINUED | OUTPATIENT
Start: 2023-06-12 | End: 2023-06-13 | Stop reason: HOSPADM

## 2023-06-12 RX ORDER — FLUTICASONE FUROATE AND VILANTEROL 100; 25 UG/1; UG/1
1 POWDER RESPIRATORY (INHALATION) DAILY
Status: DISCONTINUED | OUTPATIENT
Start: 2023-06-12 | End: 2023-06-13 | Stop reason: HOSPADM

## 2023-06-12 RX ORDER — NITROGLYCERIN 0.4 MG/1
0.4 TABLET SUBLINGUAL EVERY 5 MIN PRN
Status: DISCONTINUED | OUTPATIENT
Start: 2023-06-12 | End: 2023-06-13 | Stop reason: HOSPADM

## 2023-06-12 RX ORDER — ROSUVASTATIN CALCIUM 10 MG/1
20 TABLET, COATED ORAL EVERY EVENING
Status: DISCONTINUED | OUTPATIENT
Start: 2023-06-12 | End: 2023-06-13 | Stop reason: HOSPADM

## 2023-06-12 RX ORDER — MULTIVITAMIN,THERAPEUTIC
1 TABLET ORAL DAILY
Status: DISCONTINUED | OUTPATIENT
Start: 2023-06-13 | End: 2023-06-13 | Stop reason: HOSPADM

## 2023-06-12 RX ORDER — TACROLIMUS 1 MG/G
OINTMENT TOPICAL 2 TIMES DAILY PRN
COMMUNITY

## 2023-06-12 RX ORDER — ALBUTEROL SULFATE 90 UG/1
2 AEROSOL, METERED RESPIRATORY (INHALATION) EVERY 6 HOURS PRN
Status: DISCONTINUED | OUTPATIENT
Start: 2023-06-12 | End: 2023-06-13 | Stop reason: HOSPADM

## 2023-06-12 RX ORDER — FUROSEMIDE 20 MG
20 TABLET ORAL DAILY PRN
Status: DISCONTINUED | OUTPATIENT
Start: 2023-06-12 | End: 2023-06-13 | Stop reason: HOSPADM

## 2023-06-12 RX ORDER — PANTOPRAZOLE SODIUM 40 MG/1
40 TABLET, DELAYED RELEASE ORAL DAILY
Status: DISCONTINUED | OUTPATIENT
Start: 2023-06-12 | End: 2023-06-13 | Stop reason: HOSPADM

## 2023-06-12 RX ORDER — ENTECAVIR 0.5 MG/1
0.5 TABLET, FILM COATED ORAL DAILY
Status: DISCONTINUED | OUTPATIENT
Start: 2023-06-12 | End: 2023-06-13 | Stop reason: HOSPADM

## 2023-06-12 RX ORDER — ONDANSETRON 2 MG/ML
4 INJECTION INTRAMUSCULAR; INTRAVENOUS ONCE
Status: COMPLETED | OUTPATIENT
Start: 2023-06-12 | End: 2023-06-12

## 2023-06-12 RX ORDER — ACETAMINOPHEN 325 MG/1
975 TABLET ORAL ONCE
Status: COMPLETED | OUTPATIENT
Start: 2023-06-12 | End: 2023-06-12

## 2023-06-12 RX ORDER — POLYETHYLENE GLYCOL 3350 17 G/17G
17 POWDER, FOR SOLUTION ORAL DAILY PRN
Status: DISCONTINUED | OUTPATIENT
Start: 2023-06-12 | End: 2023-06-13 | Stop reason: HOSPADM

## 2023-06-12 RX ORDER — PREDNISONE 5 MG/1
5 TABLET ORAL DAILY
Status: DISCONTINUED | OUTPATIENT
Start: 2023-06-13 | End: 2023-06-13 | Stop reason: HOSPADM

## 2023-06-12 RX ORDER — ACETAMINOPHEN 325 MG/1
650 TABLET ORAL EVERY 4 HOURS PRN
Status: DISCONTINUED | OUTPATIENT
Start: 2023-06-12 | End: 2023-06-13 | Stop reason: HOSPADM

## 2023-06-12 RX ORDER — LIDOCAINE 4 G/G
1 PATCH TOPICAL DAILY PRN
COMMUNITY
End: 2023-08-20

## 2023-06-12 RX ORDER — ACETAMINOPHEN 650 MG/1
650 SUPPOSITORY RECTAL EVERY 4 HOURS PRN
Status: DISCONTINUED | OUTPATIENT
Start: 2023-06-12 | End: 2023-06-13 | Stop reason: HOSPADM

## 2023-06-12 RX ORDER — LATANOPROST 50 UG/ML
1 SOLUTION/ DROPS OPHTHALMIC AT BEDTIME
Status: DISCONTINUED | OUTPATIENT
Start: 2023-06-12 | End: 2023-06-13 | Stop reason: HOSPADM

## 2023-06-12 RX ORDER — MYCOPHENOLATE MOFETIL 250 MG/1
750 CAPSULE ORAL 2 TIMES DAILY
Status: DISCONTINUED | OUTPATIENT
Start: 2023-06-12 | End: 2023-06-13 | Stop reason: HOSPADM

## 2023-06-12 RX ADMIN — NITROGLYCERIN 15 MG: 20 OINTMENT TOPICAL at 12:20

## 2023-06-12 RX ADMIN — ACETAMINOPHEN 650 MG: 325 TABLET ORAL at 22:07

## 2023-06-12 RX ADMIN — ENTECAVIR 0.5 MG: 0.5 TABLET ORAL at 17:45

## 2023-06-12 RX ADMIN — ACETAMINOPHEN 650 MG: 325 TABLET ORAL at 17:45

## 2023-06-12 RX ADMIN — Medication 81 MG: at 15:38

## 2023-06-12 RX ADMIN — LATANOPROST 1 DROP: 50 SOLUTION OPHTHALMIC at 20:18

## 2023-06-12 RX ADMIN — ACETAMINOPHEN 975 MG: 325 TABLET ORAL at 11:40

## 2023-06-12 RX ADMIN — MYCOPHENOLATE MOFETIL 750 MG: 250 CAPSULE ORAL at 20:17

## 2023-06-12 RX ADMIN — FLUTICASONE FUROATE AND VILANTEROL TRIFENATATE 1 PUFF: 100; 25 POWDER RESPIRATORY (INHALATION) at 15:39

## 2023-06-12 RX ADMIN — ISOSORBIDE MONONITRATE 60 MG: 30 TABLET, EXTENDED RELEASE ORAL at 13:52

## 2023-06-12 RX ADMIN — ROSUVASTATIN CALCIUM 20 MG: 10 TABLET, FILM COATED ORAL at 20:17

## 2023-06-12 RX ADMIN — NITROGLYCERIN 0.4 MG: 0.4 TABLET, ORALLY DISINTEGRATING SUBLINGUAL at 11:01

## 2023-06-12 RX ADMIN — PANTOPRAZOLE SODIUM 40 MG: 40 TABLET, DELAYED RELEASE ORAL at 15:38

## 2023-06-12 RX ADMIN — ONDANSETRON 4 MG: 2 INJECTION INTRAMUSCULAR; INTRAVENOUS at 11:04

## 2023-06-12 ASSESSMENT — ACTIVITIES OF DAILY LIVING (ADL)
ADLS_ACUITY_SCORE: 35
ADLS_ACUITY_SCORE: 37
ADLS_ACUITY_SCORE: 39
ADLS_ACUITY_SCORE: 37
DEPENDENT_IADLS:: INDEPENDENT

## 2023-06-12 ASSESSMENT — ANXIETY QUESTIONNAIRES
8. IF YOU CHECKED OFF ANY PROBLEMS, HOW DIFFICULT HAVE THESE MADE IT FOR YOU TO DO YOUR WORK, TAKE CARE OF THINGS AT HOME, OR GET ALONG WITH OTHER PEOPLE?: SOMEWHAT DIFFICULT
5. BEING SO RESTLESS THAT IT IS HARD TO SIT STILL: SEVERAL DAYS
GAD7 TOTAL SCORE: 10
3. WORRYING TOO MUCH ABOUT DIFFERENT THINGS: SEVERAL DAYS
GAD7 TOTAL SCORE: 10
1. FEELING NERVOUS, ANXIOUS, OR ON EDGE: MORE THAN HALF THE DAYS
IF YOU CHECKED OFF ANY PROBLEMS ON THIS QUESTIONNAIRE, HOW DIFFICULT HAVE THESE PROBLEMS MADE IT FOR YOU TO DO YOUR WORK, TAKE CARE OF THINGS AT HOME, OR GET ALONG WITH OTHER PEOPLE: SOMEWHAT DIFFICULT
4. TROUBLE RELAXING: MORE THAN HALF THE DAYS
7. FEELING AFRAID AS IF SOMETHING AWFUL MIGHT HAPPEN: SEVERAL DAYS
6. BECOMING EASILY ANNOYED OR IRRITABLE: SEVERAL DAYS
2. NOT BEING ABLE TO STOP OR CONTROL WORRYING: MORE THAN HALF THE DAYS
7. FEELING AFRAID AS IF SOMETHING AWFUL MIGHT HAPPEN: SEVERAL DAYS
GAD7 TOTAL SCORE: 10

## 2023-06-12 ASSESSMENT — PATIENT HEALTH QUESTIONNAIRE - PHQ9
10. IF YOU CHECKED OFF ANY PROBLEMS, HOW DIFFICULT HAVE THESE PROBLEMS MADE IT FOR YOU TO DO YOUR WORK, TAKE CARE OF THINGS AT HOME, OR GET ALONG WITH OTHER PEOPLE: VERY DIFFICULT
SUM OF ALL RESPONSES TO PHQ QUESTIONS 1-9: 15
SUM OF ALL RESPONSES TO PHQ QUESTIONS 1-9: 15

## 2023-06-12 NOTE — ED PROVIDER NOTES
EMERGENCY DEPARTMENT ENCOUNTER      NAME: Jerad Ross  AGE: 63 year old male  YOB: 1960  MRN: [id number]  EVALUATION DATE & TIME: No admission date for patient encounter.    PCP: Aston Perry    ED PROVIDER: Leonel Stone M.D.      Chief Complaint   Patient presents with     Chest Pain         FINAL IMPRESSION:  1. Chest pain, unspecified type          ED COURSE & MEDICAL DECISION MAKING:    Pertinent Labs & Imaging studies reviewed. (See chart for details)  ED Course as of 06/12/23 1410   Mon Jun 12, 2023   1032 Patient is a 63-year-old gentleman who presents with about 3 and half hours of sharp chest pains/pressure.  History of STEMI, CABG.  Feels somewhat similar to prior cardiac chest pain.  He arrives with a blood pressure of 123/92, normal respiratory rate and oxygenation.  Mildly uncomfortable.  Plan to screen for ACS.   1039 EKG shows sinus rhythm with a rate of 67.  No acute ischemic ST elevation or depression.  No significant extubation.  Normal intervals.  When compared to prior EKG on September 20, 2021, diffuse flattening of the T waves without any other significant changes.  Impression: Sinus rhythm, LVH, no evidence of acute ischemic changes.   1119 Troponin T, High Sensitivity: 22   1119 Normal hepatic panel   1126 Pain not significantly improved by nitro dose here.  This would be his third dose, he had 2 prior to arrival.   1200 Patient initially improved, pain has worsened to 5 out of 10.  I ordered nitroglycerin paste.  Small dose of morphine also ordered.  Plan to admit for possible unstable angina, refractory chest pain in a patient with a high HEART score   1228 N-Terminal Pro BNP Inpatient: 320   1228 I spoke to Dr. Edgar with cardiology who is in agreement with plan so far.   1232 I spoke to the hospitalist who accepted admission.  I will start with observation status given initial troponin being negative.   1309 Troponin T, High Sensitivity: 16        Additional ED Course Timestamps:  10:21 AM I met with the patient, obtained history, performed an initial exam, and discussed options and plan for diagnostics and treatment here in the ED.    11:39 PM Rechecked on the patient who reports pain has mildly subside. We discussed plans for admission.   12:14 PM Spoke to Dr. Edgar of Cardiology.   12:31 PM Discussed with Hospitalist Dr Leal about plans for admission for the patient.     Medical Decision Making    History:    Supplemental history from: Documented in chart, if applicable    External Record(s) reviewed: Documented in chart, if applicable. and Other: Reviewed Stress test 08/02/22    Work Up:    Chart documentation includes differential considered and any EKGs or imaging interpreted by provider.    In additional to work up documented, I considered the following work up: Documented in chart, if applicable.    External consultation:    Discussion of management with another provider: Documented in chart, if applicable and Cardiology and Hospitalist    Complicating factors:    Care impacted by chronic illness: Hyperlipidemia and Hypertension    Care affected by social determinants of health: N/A    Disposition considerations: Admit.      At the conclusion of the encounter I discussed the results of all of the tests and the disposition. The questions were answered. The patient or family acknowledged understanding and was agreeable with the care plan.       30 minutes of critical care time for chest pain consistent with unstable angina, Nitropaste ordered, postcardiac monitoring, cardiology consultation. This excludes time spent performing interventions or procedures.    MEDICATIONS GIVEN IN THE EMERGENCY:  Medications   nitroGLYcerin (NITRO-BID) 2 % ointment 15 mg (15 mg Transdermal $Patch/Med Applied 6/12/23 1220)   ondansetron (ZOFRAN) injection 4 mg (4 mg Intravenous $Given 6/12/23 1104)   nitroGLYcerin (NITROSTAT) sublingual tablet 0.4 mg (0.4 mg  Sublingual $Given 6/12/23 1101)   acetaminophen (TYLENOL) tablet 975 mg (975 mg Oral $Given 6/12/23 1140)         NEW PRESCRIPTIONS STARTED AT TODAY'S ER VISIT  New Prescriptions    No medications on file          =================================================================    HPI    Patient information was obtained from: Patient     Use of : N/A       Jerad Ross is a 63 year old male with a pertinent history of AION, JULIANE, NSTEMI, dyslipidemia, hypertension, BCC, end stage renal disease, kidney transplant, GERD, depression, hyperlipidemia, hypertension, chronic hepatitis B, who presents to this ED for evaluation of chest pain.     The patient has been having chest pain since 9:30 AM that is sharp in nature. He took Nitro with no relief. He rates the pain 4-5 out of 10. About 3 days ago, he had the sensation of heart skipping a beat. It has been a couple months since he had chest pain. The patient has past medical history of heart attack and bypass procedure performed. He denies similar pain to heart attack. He denies any abdominal pain, shortness of breath, or any other medical concerns.       REVIEW OF SYSTEMS   All systems reviewed and negative except as noted in HPI.    PAST MEDICAL HISTORY:  Past Medical History:   Diagnosis Date     Acute midline low back pain without sciatica      AION (acute ischemic optic neuropathy)      AION (acute ischemic optic neuropathy)      Anemia in chronic renal disease      Anemia in chronic renal disease      Avascular necrosis of bones of both hips (H)     s/p bilateral hip replacements     Avascular necrosis of bones of both hips (H)     s/p bilateral hip replacements     Basal cell carcinoma      Basal cell carcinoma      Chronic hepatitis B (H)      Chronic hepatitis B (H)      Clostridium difficile colitis      Clostridium difficile colitis      Coronary artery disease involving native coronary artery of native heart without angina pectoris 6/17/2014     Coronary angiogram 6/17/14: Severe distal 3-vessel disease involving small vessels, not amenable to PCI or CABG.     Coronary artery disease involving native coronary artery of native heart without angina pectoris 06/17/2014    Coronary angiogram 6/17/14: Severe distal 3-vessel disease involving small vessels, not amenable to PCI or CABG     CRP elevated      Depression      Depression      Diverticulosis      Diverticulosis      Dyslipidemia      Dyslipidemia      Elevated C-reactive protein (CRP)      FSGS (focal segmental glomerulosclerosis)      FSGS (focal segmental glomerulosclerosis)      Gastric ulcer with hemorrhage 2/12/12     Gastric ulcer with hemorrhage 02/12/2012     GERD (gastroesophageal reflux disease)      GERD (gastroesophageal reflux disease)      Glaucoma     OHTN     Glaucoma      Hemorrhoids      Hemorrhoids      HTN (hypertension)      Hyperlipidemia      Hypertension secondary to other renal disorders      Hypogonadism in male      Hypogonadism in male      Immunosuppressed status (H)      Kidney replaced by transplant     focal glomerulosclerosis      Kidney transplanted     focal glomerulosclerosis      NSTEMI (non-ST elevated myocardial infarction) (H) 6/17/2014     NSTEMI (non-ST elevated myocardial infarction) (H) 06/17/2014     JULIANE (obstructive sleep apnea)     Doesn't use CPAP     JULIANE (obstructive sleep apnea)      Paracentral scotoma     LE     Paracentral scotoma     LE     Secondary renal hyperparathyroidism (H)      Secondary renal hyperparathyroidism (H)      Septic bursitis      Squamous cell carcinoma      Squamous cell carcinoma        PAST SURGICAL HISTORY:  Past Surgical History:   Procedure Laterality Date     APPENDECTOMY       APPENDECTOMY       Cardiac Bypass surgery  06/08/2020    [facility]      CATARACT EXTRACTION EXTRACAPSULAR W/ INTRAOCULAR LENS IMPLANTATION Bilateral 4-20-10, 6-1-10     CATARACT IOL, RT/LT  4/19/2000    RE     CATARACT IOL, RT/LT  6/1/2000    LE      COLECTOMY PARTIAL  1983     10 cm, diverticulitis      COLECTOMY SUBTOTAL  1983    10 cm, diverticulitis     COLONOSCOPY  2/13/2012    Procedure:COLONOSCOPY; Surgeon:SLOAN GALLARDO; Location:[facility]     COLONOSCOPY N/A 1/22/2020    Procedure: Colonoscopy, With Polypectomy And Biopsy;  Surgeon: Aston Kiran MD;  Location: [facility]     COLONOSCOPY  02/13/2012     CV CORONARY ANGIOGRAM N/A 6/2/2020    Procedure: Coronary Angiogram;  Surgeon: Alona Florentino MD;  Location: [facility];  Service: Cardiology     CV CORONARY ANGIOGRAM N/A 9/20/2021    Procedure: Coronary Angiogram;  Surgeon: Adam Agudelo MD;  Location: [facility] CV     CV LEFT HEART CATHETERIZATION WITHOUT LEFT VENTRICULOGRAM Left 6/2/2020    Procedure: Left Heart Catheterization Without Left Ventriculogram;  Surgeon: Alona Florentino MD;  Location: [facility] Cath Lab;  Service: Cardiology     CV SUPRAVALVULAR AORTOGRAM N/A 9/20/2021    Procedure: Supravalvular Aortagram;  Surgeon: Adam Agudelo MD;  Location: [facility] CV     ESOPHAGOSCOPY, GASTROSCOPY, DUODENOSCOPY (EGD), COMBINED  2/13/2012    Procedure:COMBINED ESOPHAGOSCOPY, GASTROSCOPY, DUODENOSCOPY (EGD); Surgeon:SLOAN GALLARDO; Location: GI     ESOPHAGOSCOPY, GASTROSCOPY, DUODENOSCOPY (EGD), COMBINED  02/13/2012     EXTRACAPSULAR CATARACT EXTRATION WITH INTRAOCULAR LENS IMPLANT  4-20-10, 6-1-10    Rt, Lt     HERNIA REPAIR  1995    Lt inguinal     HERNIA REPAIR  1995    Lt inguinal     HIP SURGERY      1981, bilat MITZI, revised 2001, 2005     HIP SURGERY  1981    bilat MITZI, revised 2001, 2005     IR FINE NEEDLE ASPIRATION W ULTRASOUND  4/10/2023     KIDNEY SURGERY  1978 and 1993    transplant     KIDNEY SURGERY  1978, 1993    transplant     KNEE SURGERY  1983, 1987    bilat TKA     KNEE SURGERY Bilateral 1983, 1987     MOHS MICROGRAPHIC PROCEDURE       MOHS MICROGRAPHIC PROCEDURE       OTHER SURGICAL HISTORY      kidney  [id number], 1993     PHACOEMULSIFICATION CLEAR CORNEA WITH STANDARD INTRAOCULAR LENS IMPLANT Right 04/19/2000     PHACOEMULSIFICATION CLEAR CORNEA WITH STANDARD INTRAOCULAR LENS IMPLANT Left 06/01/2000     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, auxiliary spleen     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, auxiliary spleen     TONSILLECTOMY       TONSILLECTOMY             CURRENT MEDICATIONS:    Current Facility-Administered Medications   Medication     nitroGLYcerin (NITRO-BID) 2 % ointment 15 mg     Current Outpatient Medications   Medication     acetaminophen (TYLENOL) 325 MG tablet     albuterol (PROAIR HFA) 108 (90 Base) MCG/ACT inhaler     aspirin 81 MG tablet     budesonide (PULMICORT FLEXHALER) 90 MCG/ACT inhaler     calcium citrate-vitamin D (CITRACAL) 315-200 MG-UNIT TABS     entecavir (BARACLUDE) 0.5 MG tablet     FLUoxetine (PROZAC) 40 MG capsule     fluticasone-salmeterol (ADVAIR) 250-50 MCG/ACT inhaler     furosemide (LASIX) 20 MG tablet     isosorbide mononitrate (IMDUR) 30 MG 24 hr tablet     latanoprost (XALATAN) 0.005 % ophthalmic solution     Lidocaine (LIDOCARE) 4 % Patch     metoprolol tartrate (LOPRESSOR) 25 MG tablet     Multiple Vitamins-Iron (ONE DAILY MULTIVITAMIN/IRON) TABS     mycophenolate (GENERIC EQUIVALENT) 250 MG capsule     nitroGLYcerin (NITROSTAT) 0.4 MG sublingual tablet     Omega-3 Fatty Acids (OMEGA 3 PO)     ondansetron (ZOFRAN ODT) 4 MG ODT tab     pantoprazole (PROTONIX) 40 MG EC tablet     polyethylene glycol (MIRALAX) 17 g packet     potassium chloride ER (KLOR-CON M) 20 MEQ CR tablet     predniSONE (DELTASONE) 5 MG tablet     rosuvastatin (CRESTOR) 20 MG tablet     tacrolimus (PROTOPIC) 0.1 % external ointment       ALLERGIES:  Allergies   Allergen Reactions     Penicillins Shortness Of Breath and Hives     Keflex [Cephalexin Hcl] Other (See Comments)     Pt could not recall reaction     Tetracycline Other (See Comments)     Patient could not recall reaction     Sulfa Antibiotics  "Rash       FAMILY HISTORY:  Family History   Problem Relation Age of Onset     Cardiovascular Father         AI with valve repair     Hypertension Father      Kidney Disease Father      Other - See Comments Father         AI with valve repair     Cerebrovascular Disease Maternal Grandfather      Other - See Comments Maternal Grandfather         cerebrovascular disease     Cancer Paternal Grandmother         ovarian ca     Ovarian Cancer Paternal Grandmother      Cerebrovascular Disease Paternal Grandfather      Kidney Disease Paternal Aunt      Kidney Disease Paternal Aunt      Skin Cancer No family hx of      Glaucoma No family hx of      Macular Degeneration No family hx of      Amblyopia No family hx of      Melanoma No family hx of      Diabetes No family hx of        SOCIAL HISTORY:   Social History     Socioeconomic History     Marital status:      Number of children: 0   Occupational History     Employer: DISABLED     Occupation:      Employer: [employer]   Tobacco Use     Smoking status: Former     Packs/day: 1.00     Years: 9.00     Pack years: 9.00     Types: Cigarettes     Quit date: 1988     Years since quittin.4     Smokeless tobacco: Former   Vaping Use     Vaping status: Never Used   Substance and Sexual Activity     Alcohol use: Yes     Alcohol/week: 0.0 standard drinks of alcohol     Comment: rarely 1 drink per month     Drug use: No   Other Topics Concern     Special Diet No     Exercise Yes     Comment: walks   Social History Narrative    . Wife is also a kidney transplant recipient.     Works part-time       VITALS:  /80   Pulse 53   Temp 97.8  F (36.6  C) (Oral)   Resp 25   Ht 1.727 m (5' 8\")   Wt 81.6 kg (180 lb)   SpO2 93%   BMI 27.37 kg/m      PHYSICAL EXAM    Constitutional: Well developed, well nourished. Uncomfortable appearing.  HENT: Normocephalic, atraumatic, nose normal. Neck- Supple, gross ROM intact. Dry mucous membrane.  Eyes: " Pupils mid-range, conjunctiva without injection, no discharge.   Respiratory: Clear to auscultation bilaterally, no respiratory distress, no wheezing, speaks full sentences easily. No cough.  Cardiovascular: Normal heart rate, regular rhythm, no murmurs.   GI: Soft, no tenderness to deep palpation in all quadrants, no masses.  Musculoskeletal: Moving all 4 extremities intentionally and without pain. No obvious deformity.  Skin: Warm, dry, no rash. Sternotomy scar  Neurologic: Alert & oriented x 3, cranial nerves grossly intact.  Psychiatric: Affect normal, cooperative.       LAB:  All pertinent labs reviewed and interpreted.  Labs Ordered and Resulted from Time of ED Arrival to Time of ED Departure   CBC WITH PLATELETS - Abnormal       Result Value    WBC Count 8.1      RBC Count 4.98      Hemoglobin 14.6      Hematocrit 45.1      MCV 91      MCH 29.3      MCHC 32.4      RDW 17.1 (*)     Platelet Count 261     BASIC METABOLIC PANEL - Normal    Sodium 142      Potassium 3.9      Chloride 105      Carbon Dioxide (CO2) 27      Anion Gap 10      Urea Nitrogen 9.4      Creatinine 0.69      Calcium 9.7      Glucose 93      GFR Estimate >90     TROPONIN T, HIGH SENSITIVITY - Normal    Troponin T, High Sensitivity 22     HEPATIC FUNCTION PANEL - Normal    Protein Total 6.7      Albumin 3.8      Bilirubin Total 0.8      Alkaline Phosphatase 67      AST 30      ALT 24      Bilirubin Direct <0.20     LIPASE - Normal    Lipase 26     NT PROBNP INPATIENT - Normal    N terminal Pro BNP Inpatient 320     TROPONIN T, HIGH SENSITIVITY       RADIOLOGY:  Reviewed all pertinent imaging. Please see official radiology report.  No orders to display       EKG:    All EKG interpretations will be found in ED course above.    PROCEDURES:   None      I, Ltia Her am serving as a scribe to document services personally performed by Dr. Leonel Stone based on my observation and the provider's statements to me. I, Leonel Stone MD attest  that Lita Her is acting in a scribe capacity, has observed my performance of the services and has documented them in accordance with my direction.    Leonel Stone M.D.  Emergency Medicine  [facility] EMERGENCY DEPARTMENT  [address] [place] [id number]  [phone]  Dept: [phone]        Leonel Stone MD  06/12/23 8596

## 2023-06-12 NOTE — ED TRIAGE NOTES
"Patient c/o chest pain started  0900 today described as sharp pain , radiates to left shoulder.  Took \"nitroglycerin\" x 2 and tylenol.     Triage Assessment     Row Name 06/12/23 1016       Triage Assessment (Adult)    Airway WDL WDL       Respiratory WDL    Respiratory WDL WDL       Skin Circulation/Temperature WDL    Skin Circulation/Temperature WDL WDL       Cardiac WDL    Cardiac WDL chest pain       Chest Pain Assessment    Chest Pain Location anterior chest, left;shoulder, left    Chest Pain Radiation shoulder    Character sharp    Precipitating Factors at rest;activity       Peripheral/Neurovascular WDL    Peripheral Neurovascular WDL WDL       Cognitive/Neuro/Behavioral WDL    Cognitive/Neuro/Behavioral WDL WDL              "

## 2023-06-12 NOTE — TELEPHONE ENCOUNTER
Called pt to discuss recommendations, pt is having chest pain currently and on way to ED.     No orders entered at this time.  -rah    ===View-only below this line===  ----- Message -----  From: Sascha Lundberg MD  Sent: 6/9/2023   5:24 PM CDT  To: Monet Soriano RN    Given hip replacement in the setting of a man who has had bypass surgery and had some chest discomfort when he saw me I think it would be reasonable to go ahead and set up exercise stress nuclear, if hip is really bad make it a pharmacological stress nuclear.  Depending on what that shows and have the skips continue would then consider getting a 1 week event monitor.  LF

## 2023-06-12 NOTE — H&P
"[facility]    History and Physical - Hospitalist Service       Date of Admission:  6/12/2023    Assessment & Plan      Jerad Ross is a 63 year old male admitted on 6/12/2023. He has a history of hypertension,HLD,  JULIANE, depression, CAD s/p CABGx3 9/20/2020, NSTEMI, AION, chronic hep B presented with left-sided chest pain.  Pain is sharp, 5 out of 10, radiating to left shoulder, non exertional and did not improve much with nitro sublingual.  In the ED, ECG did not show DAYDAY, troponins were negative. Has CHAPPELL but no orthopnea, PND, leg swelling. Cardiology was consulted in the ED. He will be admitted for observation overnight and a stress test    Chest pain 2/2 ?UA  -Continue aspirin  -Isosorbide mononitrate 60 mg daily, metoprolol XL 12.5 mg daily  -Cardiology consult appreciated  -Stress test  -nitrosl prn    Essential hypertension  - c/w metorplol succ  - monitor vital signs per protocol    HLD  - c/w crestor    CKD S/P renal transplant in 1993  -c/w PTA Mycophenolate and prednisone  -monitor BMP    Depression/Anxiety  - c/w PTA fluoxetine    JULIANE  -Not on CPAP at home    Glaucoma  - C/w PTA xalatan    Asthma/seasonal allergy  -c/w PTA meds    Chronic hep b  -c/w PTA entecavir       Diet: Combination Diet Low Saturated Fat Na <2400mg Diet, No Caffeine Diet    DVT Prophylaxis: Pneumatic Compression Devices  [place] Catheter: Not present  Lines: None     Cardiac Monitoring: ACTIVE order. Indication: Chest pain/ ACS rule out (24 hours)  Code Status: Full Code    Clinically Significant Risk Factors Present on Admission                # Drug Induced Platelet Defect: home medication list includes an antiplatelet medication   # Hypertension: Noted on problem list      # Overweight: Estimated body mass index is 27.37 kg/m  as calculated from the following:    Height as of this encounter: 1.727 m (5' 8\").    Weight as of this encounter: 81.6 kg (180 lb).            Disposition Plan      Expected " Discharge Date: 06/13/2023                  Nidia Leal MD  Hospitalist Service  [facility]  Securely message with [employer] (more info)  Text page via [employer] Paging/Directory     ______________________________________________________________________    Chief Complaint   Left sided chest pain    History is obtained from the patient    History of Present Illness   Jerad Ross is a 63 year old male who has a history of hypertension,HLD,  JULIANE, depression, CAD s/p CABGx3 9/20/2020, NSTEMI, AION, chronic hep B presented with left-sided chest pain.  Pain is sharp, 5 out of 10, radiating to left shoulder, non exertional and did not improve much with nitro sublingual.  In the ED, ECG did not show DAYDAY, troponin were negative. Has CHAPPELL but no orthopnea, PND, leg swelling. Cardiology was consulted in the ED. He will be admitted for observation overnight and a stress test      Past Medical History    Past Medical History:   Diagnosis Date     Acute midline low back pain without sciatica      AION (acute ischemic optic neuropathy)      AION (acute ischemic optic neuropathy)      Anemia in chronic renal disease      Anemia in chronic renal disease      Avascular necrosis of bones of both hips (H)     s/p bilateral hip replacements     Avascular necrosis of bones of both hips (H)     s/p bilateral hip replacements     Basal cell carcinoma      Basal cell carcinoma      Chronic hepatitis B (H)      Chronic hepatitis B (H)      Clostridium difficile colitis      Clostridium difficile colitis      Coronary artery disease involving native coronary artery of native heart without angina pectoris 6/17/2014    Coronary angiogram 6/17/14: Severe distal 3-vessel disease involving small vessels, not amenable to PCI or CABG.     Coronary artery disease involving native coronary artery of native heart without angina pectoris 06/17/2014    Coronary angiogram 6/17/14: Severe distal 3-vessel disease involving small  vessels, not amenable to PCI or CABG     CRP elevated      Depression      Depression      Diverticulosis      Diverticulosis      Dyslipidemia      Dyslipidemia      Elevated C-reactive protein (CRP)      FSGS (focal segmental glomerulosclerosis)      FSGS (focal segmental glomerulosclerosis)      Gastric ulcer with hemorrhage 2/12/12     Gastric ulcer with hemorrhage 02/12/2012     GERD (gastroesophageal reflux disease)      GERD (gastroesophageal reflux disease)      Glaucoma     OHTN     Glaucoma      Hemorrhoids      Hemorrhoids      HTN (hypertension)      Hyperlipidemia      Hypertension secondary to other renal disorders      Hypogonadism in male      Hypogonadism in male      Immunosuppressed status (H)      Kidney replaced by transplant     focal glomerulosclerosis      Kidney transplanted     focal glomerulosclerosis      NSTEMI (non-ST elevated myocardial infarction) (H) 6/17/2014     NSTEMI (non-ST elevated myocardial infarction) (H) 06/17/2014     JULIANE (obstructive sleep apnea)     Doesn't use CPAP     JULIANE (obstructive sleep apnea)      Paracentral scotoma     LE     Paracentral scotoma     LE     Secondary renal hyperparathyroidism (H)      Secondary renal hyperparathyroidism (H)      Septic bursitis      Squamous cell carcinoma      Squamous cell carcinoma        Past Surgical History   Past Surgical History:   Procedure Laterality Date     APPENDECTOMY       APPENDECTOMY       Cardiac Bypass surgery  06/08/2020    [place]      CATARACT EXTRACTION EXTRACAPSULAR W/ INTRAOCULAR LENS IMPLANTATION Bilateral 4-20-10, 6-1-10     CATARACT IOL, RT/LT  4/19/2000    RE     CATARACT IOL, RT/LT  6/1/2000    LE     COLECTOMY PARTIAL  1983     10 cm, diverticulitis      COLECTOMY SUBTOTAL  1983    10 cm, diverticulitis     COLONOSCOPY  2/13/2012    Procedure:COLONOSCOPY; Surgeon:SLOAN GALLARDO; Location: GI     COLONOSCOPY N/A 1/22/2020    Procedure: Colonoscopy, With Polypectomy And Biopsy;   Surgeon: Aston Kiran MD;  Location: [facility]     COLONOSCOPY  02/13/2012     CV CORONARY ANGIOGRAM N/A 6/2/2020    Procedure: Coronary Angiogram;  Surgeon: Alona Florentino MD;  Location: [facility] Cath Lab;  Service: Cardiology     CV CORONARY ANGIOGRAM N/A 9/20/2021    Procedure: Coronary Angiogram;  Surgeon: Adam Agudelo MD;  Location: [facility] CV     CV LEFT HEART CATHETERIZATION WITHOUT LEFT VENTRICULOGRAM Left 6/2/2020    Procedure: Left Heart Catheterization Without Left Ventriculogram;  Surgeon: Alona Florentino MD;  Location: [facility] Cath Lab;  Service: Cardiology     CV SUPRAVALVULAR AORTOGRAM N/A 9/20/2021    Procedure: Supravalvular Aortagram;  Surgeon: Adam Agudelo MD;  Location: [facility] CV     ESOPHAGOSCOPY, GASTROSCOPY, DUODENOSCOPY (EGD), COMBINED  2/13/2012    Procedure:COMBINED ESOPHAGOSCOPY, GASTROSCOPY, DUODENOSCOPY (EGD); Surgeon:SLOAN GALLARDO; Location: GI     ESOPHAGOSCOPY, GASTROSCOPY, DUODENOSCOPY (EGD), COMBINED  02/13/2012     EXTRACAPSULAR CATARACT EXTRATION WITH INTRAOCULAR LENS IMPLANT  4-20-10, 6-1-10    Rt, Lt     HERNIA REPAIR  1995    Lt inguinal     HERNIA REPAIR  1995    Lt inguinal     HIP SURGERY      1981, bilat MITZI, revised 2001, 2005     HIP SURGERY  1981    bilat MITZI, revised 2001, 2005     IR FINE NEEDLE ASPIRATION W ULTRASOUND  4/10/2023     KIDNEY SURGERY  1978 and 1993    transplant     KIDNEY SURGERY  1978, 1993    transplant     KNEE SURGERY  1983, 1987    bilat TKA     KNEE SURGERY Bilateral 1983, 1987     MOHS MICROGRAPHIC PROCEDURE       MOHS MICROGRAPHIC PROCEDURE       OTHER SURGICAL HISTORY      kidney [id number], 1993     PHACOEMULSIFICATION CLEAR CORNEA WITH STANDARD INTRAOCULAR LENS IMPLANT Right 04/19/2000     PHACOEMULSIFICATION CLEAR CORNEA WITH STANDARD INTRAOCULAR LENS IMPLANT Left 06/01/2000     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, auxiliary spleen     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, auxiliary  spleen     TONSILLECTOMY       TONSILLECTOMY         Prior to Admission Medications   Prior to Admission Medications   Prescriptions Last Dose Informant Patient Reported? Taking?   FLUoxetine (PROZAC) 40 MG capsule 6/11/2023 at am  No Yes   Sig: Take 1 capsule (40 mg) by mouth daily   Lidocaine (LIDOCARE) 4 % Patch Unknown at prn  Yes Yes   Sig: Place 1 patch onto the skin daily as needed for moderate pain To prevent lidocaine toxicity, patient should be patch free for 12 hrs daily.   Multiple Vitamins-Iron (ONE DAILY MULTIVITAMIN/IRON) TABS 6/11/2023 at am  Yes Yes   Sig: Take 1 tablet by mouth daily   Omega-3 Fatty Acids (OMEGA 3 PO) 6/11/2023 at am  Yes Yes   Sig: Take 1 capsule by mouth daily Dose unknown   acetaminophen (TYLENOL) 325 MG tablet Unknown at prn  Yes Yes   Sig: Take 1-2 tablets by mouth 4 times daily   albuterol (PROAIR HFA) 108 (90 Base) MCG/ACT inhaler Unknown at prn  No Yes   Sig: Inhale 2 puffs into the lungs every 6 hours as needed for shortness of breath / dyspnea or wheezing   aspirin 81 MG tablet 6/11/2023 at am  Yes Yes   Sig: Take 81 mg by mouth daily    budesonide (PULMICORT FLEXHALER) 90 MCG/ACT inhaler Unknown at prn; long time ago  No Yes   Sig: Inhale 2 puffs into the lungs 2 times daily as needed (PRN)   calcium citrate-vitamin D (CITRACAL) 315-200 MG-UNIT TABS 6/11/2023 at pm  Yes Yes   Sig: Take 1 tablet by mouth daily.   entecavir (BARACLUDE) 0.5 MG tablet 6/11/2023 at am  No Yes   Sig: Take 1 tablet (0.5 mg) by mouth daily APPT NEEDED FOR FURTHER REFILLS   fluticasone-salmeterol (ADVAIR) 250-50 MCG/ACT inhaler Unknown at prn; long time ago  No Yes   Sig: Inhale 1 puff into the lungs 2 times daily as needed (PRN)   furosemide (LASIX) 20 MG tablet Unknown at prn; long time ago  No Yes   Sig: Take 1 tablet (20 mg) by mouth daily as needed (fluid retention)   isosorbide mononitrate (IMDUR) 30 MG 24 hr tablet 6/11/2023 at am  No Yes   Sig: TAKE 1 TABLET(30 MG) BY MOUTH DAILY    latanoprost (XALATAN) 0.005 % ophthalmic solution 6/11/2023 at pm  No Yes   Sig: Place 1 drop into both eyes At Bedtime   metoprolol tartrate (LOPRESSOR) 25 MG tablet 6/11/2023 at pm  No Yes   Sig: TAKE 1/2 TABLET(12.5 MG) BY MOUTH TWICE DAILY   mycophenolate (GENERIC EQUIVALENT) 250 MG capsule 6/11/2023 at pm  No Yes   Sig: Take 3 capsules (750 mg) by mouth 2 times daily   nitroGLYcerin (NITROSTAT) 0.4 MG sublingual tablet 6/12/2023 at am  No Yes   Sig: Place 1 tablet (0.4 mg) under the tongue every 5 minutes as needed for chest pain   ondansetron (ZOFRAN ODT) 4 MG ODT tab Unknown at prn  No Yes   Sig: Take 1 tablet (4 mg) by mouth every 6 hours as needed for nausea or vomiting   pantoprazole (PROTONIX) 40 MG EC tablet 6/11/2023 at am  No Yes   Sig: Take 1 tablet (40 mg) by mouth daily   polyethylene glycol (MIRALAX) 17 g packet Unknown at prn  Yes Yes   Sig: Take 17 g by mouth daily as needed    predniSONE (DELTASONE) 5 MG tablet 6/11/2023 at am  No Yes   Sig: Take 1 tablet (5 mg) by mouth daily   rosuvastatin (CRESTOR) 20 MG tablet 6/11/2023 at pm  No Yes   Sig: TAKE 1 TABLET(20 MG) BY MOUTH DAILY   tacrolimus (PROTOPIC) 0.1 % external ointment Unknown at prn  Yes Yes   Sig: Apply topically 2 times daily as needed      Facility-Administered Medications: None        Review of Systems    The 10 point Review of Systems is negative other than noted in the HPI or here.      Physical Exam   Vital Signs: Temp: 97.8  F (36.6  C) Temp src: Oral BP: (!) 145/91 Pulse: 92   Resp: 27 SpO2: 95 % O2 Device: None (Room air)    Weight: 180 lbs 0 oz    GEN: Alert and oriented. Not in acute distress.  HEENT: Atraumatic, mucous membrane- moist and pink.  Chest: Bilateral air entry.  CVS: S1S2 regular. No murmurs, rubs or gallops.  Abdomen: Soft. Non-tender, non-distended. No organomegaly. No guarding or rigidity. Bowel sounds active.   Extremities: No pedal edema.  CNS: No focal neurologic deficit. No involuntary movements.  Skin:  No skin rash, no cyanosis or clubbing.     Medical Decision Making             Data     I have personally reviewed the following data over the past 24 hrs:    8.1  \   14.6   / 261     142 105 9.4 /  93   3.9 27 0.69 \       ALT: 24 AST: 30 AP: 67 TBILI: 0.8   ALB: 3.8 TOT PROTEIN: 6.7 LIPASE: 26       Trop: 16 BNP: 320

## 2023-06-12 NOTE — PATIENT INSTRUCTIONS
**For crisis resources, please see the information at the end of this document**   Patient Education    Thank you for coming to the [facility] [facility].     Lab Testing:  If you had lab testing today and your results are reassuring or normal they will be mailed to you or sent through [employer] within 7 days. If the lab tests need quick action we will call you with the results. The phone number we will call with results is # [phone]. If this is not the best number please call our clinic and change the number.     Medication Refills:  If you need any refills please call your pharmacy and they will contact us. Our fax number for refills is [phone].   Three business days of notice are needed for general medication refill requests.   Five business days of notice are needed for controlled substance refill requests.   If you need to change to a different pharmacy, please contact the new pharmacy directly. The new pharmacy will help you get your medications transferred.     Contact Us:  Please call [phone] during business hours (8-5:00 M-F).   If you have medication related questions after clinic hours, or on the weekend, please call [phone].     Financial Assistance [phone]   Medical Records [phone]       MENTAL HEALTH CRISIS RESOURCES:  For a emergency help, please call 911 or go to the nearest Emergency Department.     Emergency Walk-In Options:   EmPATH Unit @ [facility] ([place]): [phone] - Specialized mental health emergency area designed to be calming  [facility] ([place]): [phone]  [facility] ([place]): [phone]  [facility]): [phone]    [facility] Crisis Information:   [place]: [phone]  [place]: [phone]  [place] ([place]) - Adult: [phone]     Child: [phone]  [place] - Adult: [phone]     Child: [phone]  [place]:  [phone]  List of all [facility] resources:   https://[place].gov/dhs/people-we-serve/adults/health-care/mental-health/resources/crisis-contacts.jsp    National Crisis Information:   Crisis Text Line: Text  [place]  to 308783  Suicide & Crisis Lifeline: 988  National Suicide Prevention Lifeline: 1-260-986-TALK (1-702.850.6445)       For online chat options, visit https://suicidepreventionlifeline.org/chat/  Poison Control Center: 1-272-688-6066  Trans Lifeline: 8-925-203-6562 - Hotline for transgender people of all ages  [employer]: 6-639-752-3484 - Hotline for LGBT youth     For Non-Emergency Support:   Fast Tracker: Mental Health & Substance Use Disorder Resources -   https://www.[employer].org/

## 2023-06-12 NOTE — TELEPHONE ENCOUNTER
Chart reviewed, pt admitted to observation, Dr Edgar ordered Lexiscan nuclear stress.  -[facility]

## 2023-06-12 NOTE — CONSULTS
Cardiology Consult Note    Thank you, Dr. Stone, for asking the [facility] team to see Jerad BARRAGAN Vandana in consultation at [facility] ED to evaluate chest discomfort.      Assessment:   1.  Chest discomfort, etiology unclear as initial ECG showed no acute ischemic changes and first troponin within normal limits.  He reports mild continuous discomfort although less severe since presentation following sublingual nitroglycerin.  Second troponin currently pending.  If this is negative, I would tend to suggest a noncardiac cause for his discomfort.  Could attempt to pursue a nuclear stress study, given his upcoming surgery, although unclear if this could be arranged for today yet.  Does admit to anxiety over the upcoming hip revision surgery which could be contributing to his current symptoms.  2.  Coronary artery disease, status post CABG x3 in 2020 with LIMA graft to the LAD, saphenous vein graft to the right PDA and saphenous vein graft to the right posterolateral vessel with all 3 grafts patent at time of angiography in 2021 following an abnormal nuclear stress study.  He does have severe disease in the first diagonal and obtuse marginal branches which were too small for intervention and thus medical therapy pursued.  Last nuclear stress test in August 2022 showed no interval change.  3.  Essential hypertension, controlled  4.  Hypercholesterolemia, well controlled  5.  Chronic kidney disease status post kidney transplant in 1993 on chronic immunosuppressive therapy  6.  JULIANE treated with CPAP  7.  Depression and anxiety on medical therapy     Plan:   1.  Await results of second troponin.  Second troponin is now back at 16; since this is negative, could pursue a nuclear stress study for preoperative screening assessment.  2.  Increase patient's home isosorbide mononitrate to 60 mg daily  3.  Change metoprolol to tartrate to metoprolol succinate and decrease dose to 12.5 mg daily given  resting bradycardia.      Primary cardiologist: Dr. Sascha Lundberg     Current History:   Mr. Jerad Ross is a 63 year old male with history of multivessel coronary artery disease, status post CABG x3 in 2020 with LIMA to the LAD, saphenous vein graft to the right PDA and saphenous vein graft to the right posterolateral vessel with known severe disease of the first diagonal and first obtuse marginal branch not amenable to revascularization, hypercholesterolemia, essential hypertension, chronic kidney disease status post renal transplant in 1993 who presented to the ED today for complaints of chest discomfort.  Patient states he contacted our office 3 days prior to presentation because of skipped heartbeat.  Was not having any chest discomfort at that time.  This morning, he was having a virtual visit with his psychiatrist when he began to note some sharp left anterior chest discomfort.  After getting off the phone, he took a sublingual nitroglycerin which did not appear to provide any benefit.  Shortly after that he was contacted by our office regarding his skipped beats and hearing that he was having chest discomfort, he was advised to come to the ED.  Upon arrival here, he continued to report chest discomfort.  ECG showed sinus rhythm with marked sinus arrhythmia but no acute ischemic changes.  Initial troponin negative.  Was given a second sublingual nitroglycerin with slight improvement but without complete resolution.  Second troponin currently pending.  Total duration of discomfort now 3.5 hours.   Past Medical History:     Past Medical History:   Diagnosis Date     Acute midline low back pain without sciatica      AION (acute ischemic optic neuropathy)      AION (acute ischemic optic neuropathy)      Anemia in chronic renal disease      Anemia in chronic renal disease      Avascular necrosis of bones of both hips (H)     s/p bilateral hip replacements     Avascular necrosis of bones of both hips (H)     s/p  bilateral hip replacements     Basal cell carcinoma      Basal cell carcinoma      Chronic hepatitis B (H)      Chronic hepatitis B (H)      Clostridium difficile colitis      Clostridium difficile colitis      Coronary artery disease involving native coronary artery of native heart without angina pectoris 6/17/2014    Coronary angiogram 6/17/14: Severe distal 3-vessel disease involving small vessels, not amenable to PCI or CABG.     Coronary artery disease involving native coronary artery of native heart without angina pectoris 06/17/2014    Coronary angiogram 6/17/14: Severe distal 3-vessel disease involving small vessels, not amenable to PCI or CABG     CRP elevated      Depression      Depression      Diverticulosis      Diverticulosis      Dyslipidemia      Dyslipidemia      Elevated C-reactive protein (CRP)      FSGS (focal segmental glomerulosclerosis)      FSGS (focal segmental glomerulosclerosis)      Gastric ulcer with hemorrhage 2/12/12     Gastric ulcer with hemorrhage 02/12/2012     GERD (gastroesophageal reflux disease)      GERD (gastroesophageal reflux disease)      Glaucoma     OHTN     Glaucoma      Hemorrhoids      Hemorrhoids      HTN (hypertension)      Hyperlipidemia      Hypertension secondary to other renal disorders      Hypogonadism in male      Hypogonadism in male      Immunosuppressed status (H)      Kidney replaced by transplant     focal glomerulosclerosis      Kidney transplanted     focal glomerulosclerosis      NSTEMI (non-ST elevated myocardial infarction) (H) 6/17/2014     NSTEMI (non-ST elevated myocardial infarction) (H) 06/17/2014     JULIANE (obstructive sleep apnea)     Doesn't use CPAP     JULIANE (obstructive sleep apnea)      Paracentral scotoma     LE     Paracentral scotoma     LE     Secondary renal hyperparathyroidism (H)      Secondary renal hyperparathyroidism (H)      Septic bursitis      Squamous cell carcinoma      Squamous cell carcinoma        Past Surgical History:      Past Surgical History:   Procedure Laterality Date     APPENDECTOMY       APPENDECTOMY       Cardiac Bypass surgery  06/08/2020    [facility]      CATARACT EXTRACTION EXTRACAPSULAR W/ INTRAOCULAR LENS IMPLANTATION Bilateral 4-20-10, 6-1-10     CATARACT IOL, RT/LT  4/19/2000    RE     CATARACT IOL, RT/LT  6/1/2000    LE     COLECTOMY PARTIAL  1983     10 cm, diverticulitis      COLECTOMY SUBTOTAL  1983    10 cm, diverticulitis     COLONOSCOPY  2/13/2012    Procedure:COLONOSCOPY; Surgeon:SLOAN GALLARDO; Location: GI     COLONOSCOPY N/A 1/22/2020    Procedure: Colonoscopy, With Polypectomy And Biopsy;  Surgeon: Aston Kiran MD;  Location: [facility]     COLONOSCOPY  02/13/2012     CV CORONARY ANGIOGRAM N/A 6/2/2020    Procedure: Coronary Angiogram;  Surgeon: Alona Florentino MD;  Location: [facility];  Service: Cardiology     CV CORONARY ANGIOGRAM N/A 9/20/2021    Procedure: Coronary Angiogram;  Surgeon: Adam Agudelo MD;  [place] [facility]     CV LEFT HEART CATHETERIZATION WITHOUT LEFT VENTRICULOGRAM Left 6/2/2020    Procedure: Left Heart Catheterization Without Left Ventriculogram;  Surgeon: Alona Florentino MD;  Location: [facility];  Service: Cardiology     CV SUPRAVALVULAR AORTOGRAM N/A 9/20/2021    Procedure: Supravalvular Aortagram;  Surgeon: Adam Agudelo MD;  [place] [facility]     ESOPHAGOSCOPY, GASTROSCOPY, DUODENOSCOPY (EGD), COMBINED  2/13/2012    Procedure:COMBINED ESOPHAGOSCOPY, GASTROSCOPY, DUODENOSCOPY (EGD); Surgeon:SLOAN GALLARDO; Location: GI     ESOPHAGOSCOPY, GASTROSCOPY, DUODENOSCOPY (EGD), COMBINED  02/13/2012     EXTRACAPSULAR CATARACT EXTRATION WITH INTRAOCULAR LENS IMPLANT  4-20-10, 6-1-10    Rt, Lt     HERNIA REPAIR  1995    Lt inguinal     HERNIA REPAIR  1995    Lt inguinal     HIP SURGERY      1981, bilat MITZI, revised 2001, 2005     HIP SURGERY  1981    bilat MITZI, revised 2001, 2005     MICHAEL FINE  NEEDLE ASPIRATION W ULTRASOUND  4/10/2023     KIDNEY SURGERY  1978 and 1993    transplant     KIDNEY SURGERY  1978, 1993    transplant     KNEE SURGERY  1983, 1987    bilat TKA     KNEE SURGERY Bilateral 1983, 1987     MOHS MICROGRAPHIC PROCEDURE       MOHS MICROGRAPHIC PROCEDURE       OTHER SURGICAL HISTORY      kidney [id number], 1993     PHACOEMULSIFICATION CLEAR CORNEA WITH STANDARD INTRAOCULAR LENS IMPLANT Right 04/19/2000     PHACOEMULSIFICATION CLEAR CORNEA WITH STANDARD INTRAOCULAR LENS IMPLANT Left 06/01/2000     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, auxiliary spleen     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, auxiliary spleen     TONSILLECTOMY       TONSILLECTOMY         Family History:     Family History   Problem Relation Age of Onset     Cardiovascular Father         AI with valve repair     Hypertension Father      Kidney Disease Father      Other - See Comments Father         AI with valve repair     Cerebrovascular Disease Maternal Grandfather      Other - See Comments Maternal Grandfather         cerebrovascular disease     Cancer Paternal Grandmother         ovarian ca     Ovarian Cancer Paternal Grandmother      Cerebrovascular Disease Paternal Grandfather      Kidney Disease Paternal Aunt      Kidney Disease Paternal Aunt      Skin Cancer No family hx of      Glaucoma No family hx of      Macular Degeneration No family hx of      Amblyopia No family hx of      Melanoma No family hx of      Diabetes No family hx of        Social History:    reports that he quit smoking about 35 years ago. His smoking use included cigarettes. He has a 9.00 pack-year smoking history. He has quit using smokeless tobacco. He reports current alcohol use. He reports that he does not use drugs.    Meds:     Current Facility-Administered Medications:      isosorbide mononitrate (IMDUR) 24 hr tablet 60 mg, 60 mg, Oral, Daily, Maria Ines Edgar MD     metoprolol succinate ER (TOPROL-XL) 24 hr half-tab 12.5 mg, 12.5 mg, Oral, Daily,  Maria Ines Edgar MD    Current Outpatient Medications:      acetaminophen (TYLENOL) 325 MG tablet, Take 1-2 tablets by mouth 4 times daily, Disp: , Rfl:      albuterol (PROAIR HFA) 108 (90 Base) MCG/ACT inhaler, Inhale 2 puffs into the lungs every 6 hours as needed for shortness of breath / dyspnea or wheezing, Disp: 1 g, Rfl: 11     aspirin 81 MG tablet, Take 81 mg by mouth daily , Disp: , Rfl:      budesonide (PULMICORT FLEXHALER) 90 MCG/ACT inhaler, Inhale 2 puffs into the lungs 2 times daily as needed (PRN), Disp: 1 each, Rfl: 8     calcium citrate-vitamin D (CITRACAL) 315-200 MG-UNIT TABS, Take 1 tablet by mouth daily., Disp: , Rfl:      entecavir (BARACLUDE) 0.5 MG tablet, Take 1 tablet (0.5 mg) by mouth daily APPT NEEDED FOR FURTHER REFILLS, Disp: 90 tablet, Rfl: 3     FLUoxetine (PROZAC) 40 MG capsule, Take 1 capsule (40 mg) by mouth daily, Disp: 90 capsule, Rfl: 2     fluticasone-salmeterol (ADVAIR) 250-50 MCG/ACT inhaler, Inhale 1 puff into the lungs 2 times daily as needed (PRN), Disp: 180 each, Rfl: 3     furosemide (LASIX) 20 MG tablet, Take 1 tablet (20 mg) by mouth daily as needed (fluid retention), Disp: 90 tablet, Rfl: 1     isosorbide mononitrate (IMDUR) 30 MG 24 hr tablet, TAKE 1 TABLET(30 MG) BY MOUTH DAILY, Disp: 90 tablet, Rfl: 3     latanoprost (XALATAN) 0.005 % ophthalmic solution, Place 1 drop into both eyes At Bedtime, Disp: 2.5 mL, Rfl: 11     Lidocaine (LIDOCARE) 4 % Patch, Place 1 patch onto the skin daily as needed for moderate pain To prevent lidocaine toxicity, patient should be patch free for 12 hrs daily., Disp: , Rfl:      metoprolol tartrate (LOPRESSOR) 25 MG tablet, TAKE 1/2 TABLET(12.5 MG) BY MOUTH TWICE DAILY, Disp: 90 tablet, Rfl: 1     Multiple Vitamins-Iron (ONE DAILY MULTIVITAMIN/IRON) TABS, Take 1 tablet by mouth daily, Disp: , Rfl:      mycophenolate (GENERIC EQUIVALENT) 250 MG capsule, Take 3 capsules (750 mg) by mouth 2 times daily, Disp: 540 capsule, Rfl: 0      "nitroGLYcerin (NITROSTAT) 0.4 MG sublingual tablet, Place 1 tablet (0.4 mg) under the tongue every 5 minutes as needed for chest pain, Disp: 20 tablet, Rfl: 3     Omega-3 Fatty Acids (OMEGA 3 PO), Take 1 capsule by mouth daily Dose unknown, Disp: , Rfl:      ondansetron (ZOFRAN ODT) 4 MG ODT tab, Take 1 tablet (4 mg) by mouth every 6 hours as needed for nausea or vomiting, Disp: , Rfl:      pantoprazole (PROTONIX) 40 MG EC tablet, Take 1 tablet (40 mg) by mouth daily, Disp: 90 tablet, Rfl: 3     polyethylene glycol (MIRALAX) 17 g packet, Take 17 g by mouth daily as needed , Disp: , Rfl:      predniSONE (DELTASONE) 5 MG tablet, Take 1 tablet (5 mg) by mouth daily, Disp: 90 tablet, Rfl: 3     rosuvastatin (CRESTOR) 20 MG tablet, TAKE 1 TABLET(20 MG) BY MOUTH DAILY, Disp: 90 tablet, Rfl: 3     tacrolimus (PROTOPIC) 0.1 % external ointment, Apply topically 2 times daily as needed, Disp: , Rfl:     isosorbide mononitrate  60 mg Oral Daily     metoprolol succinate ER  12.5 mg Oral Daily       Allergies:   Penicillins, Keflex [cephalexin hcl], Tetracycline, and Sulfa antibiotics    Review of Systems:   A 12 point comprehensive review of systems was negative except as noted in the current history.    Objective:      Physical Exam  Body mass index is 27.37 kg/m .  /80   Pulse 53   Temp 97.8  F (36.6  C) (Oral)   Resp 25   Ht 1.727 m (5' 8\")   Wt 81.6 kg (180 lb)   SpO2 93%   BMI 27.37 kg/m      General Appearance:    Well-developed, well-nourished middle-age male in no acute distress   HEENT:   Normocephalic, atraumatic.  Sclera nonicteric.  Mucous membranes moist.   Neck:  No jugular venous distention.  No carotid bruits.   Chest:  Symmetric with normal AP diameter.  No clear chest wall tenderness.   Lungs:    Clear to auscultation and percussion bilaterally.   Cardiovascular:    Regular rate and rhythm.  S1, S2 normal.  No murmur or gallop   Abdomen:   Soft, nondistended, nontender.  No organomegaly or mass.  "   Extremities:  No peripheral edema, clubbing or cyanosis   Skin:  No rash or lesions   Neurologic:  Alert and oriented x3.  No gross focal deficits.             Cardiographics (personally reviewed)  ECG demonstrates sinus rhythm, left ventricular hypertrophy by voltage, inferior infarct of unknown age, nonspecific ST-T wave abnormality.  No significant change from prior ECG.    Imaging (personally reviewed)  No cardiac imaging    Lab Review (personally reviewed all results)  Recent Labs   Lab Test 10/21/22  0908   CHOL 151   HDL 65   LDL 67   TRIG 94     Recent Labs   Lab Test 10/21/22  0908 09/20/21  0739 10/28/20  0857   LDL 67 62 63     Recent Labs   Lab Test 06/12/23  1049      POTASSIUM 3.9   CHLORIDE 105   CO2 27   GLC 93   BUN 9.4   CR 0.69   GFRESTIMATED >90   HEMA 9.7     Recent Labs   Lab Test 06/12/23  1049 04/17/23  1026 04/10/23  0629   CR 0.69 0.81 0.72     Recent Labs   Lab Test 10/21/22  0908 10/28/20  0857 06/03/20  0547   A1C 6.0* 5.8* 5.6          Recent Labs   Lab Test 06/12/23  1049   WBC 8.1   HGB 14.6   HCT 45.1   MCV 91        Recent Labs   Lab Test 06/12/23  1049 04/17/23  1026 04/10/23  0629   HGB 14.6 14.3 12.6*    Recent Labs   Lab Test 05/29/20  0850 05/29/20  0112 05/28/20  1845   TROPONINI 0.09 0.10 0.13     Recent Labs   Lab Test 06/12/23  1049 10/25/21  1333 08/02/21  1301 05/23/15  1032   BNP  --  124*  --   --    NTBNPI 320  --  309 467     Recent Labs   Lab Test 09/02/21  1454   TSH 0.68     Recent Labs   Lab Test 07/19/22  1036 04/12/21  1001 06/09/20  0500   INR 1.04 1.03 1.36*

## 2023-06-12 NOTE — CONSULTS
"Care Management Initial Consult    General Information  Assessment completed with: Patient,    Type of CM/SW Visit: Initial Assessment    Primary Care Provider verified and updated as needed: Yes   Readmission within the last 30 days: no previous admission in last 30 days         Advance Care Planning:            Communication Assessment  Patient's communication style: spoken language (English or Bilingual)             Cognitive  Cognitive/Neuro/Behavioral: WDL                      Living Environment:   People in home:  (roommate)     Current living Arrangements: house      Able to return to prior arrangements: yes       Family/Social Support:  Care provided by: self  Provides care for: no one      (\"no one who lives close\")          Description of Support System:           Current Resources:   Patient receiving home care services: No     Community Resources: None  Equipment currently used at home:  cane  Supplies currently used at home: None        Lifestyle & Psychosocial Needs:  Social Determinants of Health     Tobacco Use: Medium Risk (6/12/2023)    Patient History      Smoking Tobacco Use: Former      Smokeless Tobacco Use: Former      Passive Exposure: Not on file   Alcohol Use: Not on file   Financial Resource Strain: Not on file   Food Insecurity: Not on file   Transportation Needs: Not on file   Physical Activity: Not on file   Stress: Not on file   Social Connections: Not on file   Intimate Partner Violence: Not on file   Depression: At risk (6/12/2023)    PHQ-2      PHQ-2 Score: 4   Housing Stability: Not on file       Functional Status:  Prior to admission patient needed assistance:   Dependent ADLs:: Independent  Dependent IADLs:: Independent           Additional Information:    Assessment completed with patient. Patient reports he lives independently in his house with a roommate. He is independent with ADLs and IADLs, ambulates with a cane and drives. He states has no one who lives close who is " supportive. His brother Maximino and sister Reva are family contacts if needed. Transportation at discharge TBD.       Eden Montez RN

## 2023-06-12 NOTE — PHARMACY-ADMISSION MEDICATION HISTORY
Pharmacist Admission Medication History    Admission medication history is complete. The information provided in this note is only as accurate as the sources available at the time of the update.    Medication reconciliation/reorder completed by provider prior to medication history? No    Information Source(s): Patient and CareEverywhere/SureScripts via in-person    Pertinent Information: none    Changes made to PTA medication list:    Added: None    Deleted: None    Changed: Lidocaine patch, tacrolimus to PRN    Medication Affordability:  Not including over the counter (OTC) medications, was there a time in the past 3 months when you did not take your medications as prescribed because of cost?: No    Allergies reviewed with patient and updates made in EHR: yes    Medication History Completed By: Anny Donohue [facility] 6/12/2023 12:54 PM    Prior to Admission medications    Medication Sig Last Dose Taking? Auth Provider Long Term End Date   acetaminophen (TYLENOL) 325 MG tablet Take 1-2 tablets by mouth 4 times daily Unknown at prn Yes Reported, Patient     albuterol (PROAIR HFA) 108 (90 Base) MCG/ACT inhaler Inhale 2 puffs into the lungs every 6 hours as needed for shortness of breath / dyspnea or wheezing Unknown at prn Yes Aston Perry MD Yes    aspirin 81 MG tablet Take 81 mg by mouth daily  6/11/2023 at am Yes Reported, Patient     budesonide (PULMICORT FLEXHALER) 90 MCG/ACT inhaler Inhale 2 puffs into the lungs 2 times daily as needed (PRN) Unknown at prn; long time ago Yes Nolan Santoyo, APRN CNP Yes    calcium citrate-vitamin D (CITRACAL) 315-200 MG-UNIT TABS Take 1 tablet by mouth daily. 6/11/2023 at pm Yes Reported, Patient     entecavir (BARACLUDE) 0.5 MG tablet Take 1 tablet (0.5 mg) by mouth daily APPT NEEDED FOR FURTHER REFILLS 6/11/2023 at am Yes Lina Claros MD Yes    FLUoxetine (PROZAC) 40 MG capsule Take 1 capsule (40 mg) by mouth daily 6/11/2023 at am  Yes Genia Fraser, APRN CNP Yes    fluticasone-salmeterol (ADVAIR) 250-50 MCG/ACT inhaler Inhale 1 puff into the lungs 2 times daily as needed (PRN) Unknown at prn; long time ago Yes Nolan Santoyo, RONALDO CNP Yes    furosemide (LASIX) 20 MG tablet Take 1 tablet (20 mg) by mouth daily as needed (fluid retention) Unknown at prn; long time ago Yes Aston Perry MD Yes    isosorbide mononitrate (IMDUR) 30 MG 24 hr tablet TAKE 1 TABLET(30 MG) BY MOUTH DAILY 6/11/2023 at am Yes Sascha Lundberg MD Yes    latanoprost (XALATAN) 0.005 % ophthalmic solution Place 1 drop into both eyes At Bedtime 6/11/2023 at pm Yes Wood Miller MD Yes    Lidocaine (LIDOCARE) 4 % Patch Place 1 patch onto the skin daily as needed for moderate pain To prevent lidocaine toxicity, patient should be patch free for 12 hrs daily. Unknown at prn Yes Unknown, Entered By History     metoprolol tartrate (LOPRESSOR) 25 MG tablet TAKE 1/2 TABLET(12.5 MG) BY MOUTH TWICE DAILY 6/11/2023 at pm Yes Sascha Lundberg MD Yes    Multiple Vitamins-Iron (ONE DAILY MULTIVITAMIN/IRON) TABS Take 1 tablet by mouth daily 6/11/2023 at am Yes Unknown, Entered By History     mycophenolate (GENERIC EQUIVALENT) 250 MG capsule Take 3 capsules (750 mg) by mouth 2 times daily 6/11/2023 at pm Yes Pradip Singleton MD Yes    nitroGLYcerin (NITROSTAT) 0.4 MG sublingual tablet Place 1 tablet (0.4 mg) under the tongue every 5 minutes as needed for chest pain 6/12/2023 at am Yes Sascha Lundberg MD Yes    Omega-3 Fatty Acids (OMEGA 3 PO) Take 1 capsule by mouth daily Dose unknown 6/11/2023 at am Yes Reported, Patient     ondansetron (ZOFRAN ODT) 4 MG ODT tab Take 1 tablet (4 mg) by mouth every 6 hours as needed for nausea or vomiting Unknown at prn Yes Shanice Rodriguez APRN CNP     pantoprazole (PROTONIX) 40 MG EC tablet Take 1 tablet (40 mg) by mouth daily 6/11/2023 at am Yes Aston Perry MD     polyethylene glycol (MIRALAX) 17 g  packet Take 17 g by mouth daily as needed  Unknown at prn Yes Reported, Patient No    predniSONE (DELTASONE) 5 MG tablet Take 1 tablet (5 mg) by mouth daily 6/11/2023 at am Yes Nolan Lovett MD Yes    rosuvastatin (CRESTOR) 20 MG tablet TAKE 1 TABLET(20 MG) BY MOUTH DAILY 6/11/2023 at pm Yes Sascha Lundberg MD Yes    tacrolimus (PROTOPIC) 0.1 % external ointment Apply topically 2 times daily as needed Unknown at prn Yes Unknown, Entered By History

## 2023-06-12 NOTE — NURSING NOTE
Is the patient currently in the state of [place]? YES    Visit mode:VIDEO    If the visit is dropped, the patient can be reconnected by: TELEPHONE VISIT: Phone number: [phone]    Will anyone else be joining the visit? NO      How would you like to obtain your AVS? MyChart    Are changes needed to the allergy or medication list? NO    Reason for visit: LUIS DANIEL Agarwal on 6/12/2023 at 9:21 AM

## 2023-06-12 NOTE — PROGRESS NOTES
Spoke with Jerad, he was having chest pain and planned to go in for assessment. He'll send a message later today.    This encounter was opened in error. Please disregard.

## 2023-06-13 ENCOUNTER — APPOINTMENT (OUTPATIENT)
Dept: NUCLEAR MEDICINE | Facility: HOSPITAL | Age: 63
End: 2023-06-13
Attending: INTERNAL MEDICINE
Payer: COMMERCIAL

## 2023-06-13 ENCOUNTER — APPOINTMENT (OUTPATIENT)
Dept: CARDIOLOGY | Facility: HOSPITAL | Age: 63
End: 2023-06-13
Attending: INTERNAL MEDICINE
Payer: COMMERCIAL

## 2023-06-13 VITALS
RESPIRATION RATE: 20 BRPM | WEIGHT: 180.5 LBS | OXYGEN SATURATION: 88 % | HEIGHT: 68 IN | TEMPERATURE: 99 F | BODY MASS INDEX: 27.35 KG/M2 | HEART RATE: 73 BPM | DIASTOLIC BLOOD PRESSURE: 75 MMHG | SYSTOLIC BLOOD PRESSURE: 114 MMHG

## 2023-06-13 PROBLEM — I10 HTN (HYPERTENSION): Status: ACTIVE | Noted: 2022-11-29

## 2023-06-13 PROBLEM — Z95.1 S/P CABG (CORONARY ARTERY BYPASS GRAFT): Status: ACTIVE | Noted: 2021-07-26

## 2023-06-13 LAB
CV STRESS CURRENT BP HE: NORMAL
CV STRESS CURRENT HR HE: 100
CV STRESS CURRENT HR HE: 101
CV STRESS CURRENT HR HE: 78
CV STRESS CURRENT HR HE: 81
CV STRESS CURRENT HR HE: 84
CV STRESS CURRENT HR HE: 85
CV STRESS CURRENT HR HE: 85
CV STRESS CURRENT HR HE: 86
CV STRESS CURRENT HR HE: 88
CV STRESS CURRENT HR HE: 91
CV STRESS CURRENT HR HE: 92
CV STRESS CURRENT HR HE: 96
CV STRESS CURRENT HR HE: 96
CV STRESS CURRENT HR HE: 97
CV STRESS CURRENT HR HE: 98
CV STRESS DEVIATION TIME HE: NORMAL
CV STRESS ECHO PERCENT HR HE: NORMAL
CV STRESS EXERCISE STAGE HE: NORMAL
CV STRESS FINAL RESTING BP HE: NORMAL
CV STRESS FINAL RESTING HR HE: 91
CV STRESS MAX HR HE: 101
CV STRESS MAX TREADMILL GRADE HE: 0
CV STRESS MAX TREADMILL SPEED HE: 0
CV STRESS PEAK DIA BP HE: NORMAL
CV STRESS PEAK SYS BP HE: NORMAL
CV STRESS PHASE HE: NORMAL
CV STRESS PROTOCOL HE: NORMAL
CV STRESS RESTING PT POSITION HE: NORMAL
CV STRESS ST DEVIATION AMOUNT HE: NORMAL
CV STRESS ST DEVIATION ELEVATION HE: NORMAL
CV STRESS ST EVELATION AMOUNT HE: NORMAL
CV STRESS TEST TYPE HE: NORMAL
CV STRESS TOTAL STAGE TIME MIN 1 HE: NORMAL
RATE PRESSURE PRODUCT: NORMAL
STRESS ECHO BASELINE DIASTOLIC HE: 88
STRESS ECHO BASELINE HR: 78
STRESS ECHO BASELINE SYSTOLIC BP: 142
STRESS ECHO CALCULATED PERCENT HR: 64 %
STRESS ECHO LAST STRESS DIASTOLIC BP: 96
STRESS ECHO LAST STRESS HR: 96
STRESS ECHO LAST STRESS SYSTOLIC BP: 146
STRESS ECHO TARGET HR: 159

## 2023-06-13 PROCEDURE — 99238 HOSP IP/OBS DSCHRG MGMT 30/<: CPT | Performed by: INTERNAL MEDICINE

## 2023-06-13 PROCEDURE — 250N000011 HC RX IP 250 OP 636: Performed by: INTERNAL MEDICINE

## 2023-06-13 PROCEDURE — 343N000001 HC RX 343: Performed by: INTERNAL MEDICINE

## 2023-06-13 PROCEDURE — 78452 HT MUSCLE IMAGE SPECT MULT: CPT | Mod: 26 | Performed by: INTERNAL MEDICINE

## 2023-06-13 PROCEDURE — 93016 CV STRESS TEST SUPVJ ONLY: CPT | Performed by: INTERNAL MEDICINE

## 2023-06-13 PROCEDURE — 93018 CV STRESS TEST I&R ONLY: CPT | Performed by: INTERNAL MEDICINE

## 2023-06-13 PROCEDURE — 93017 CV STRESS TEST TRACING ONLY: CPT

## 2023-06-13 PROCEDURE — 93017 CV STRESS TEST TRACING ONLY: CPT | Performed by: INTERNAL MEDICINE

## 2023-06-13 PROCEDURE — 78452 HT MUSCLE IMAGE SPECT MULT: CPT

## 2023-06-13 PROCEDURE — 250N000013 HC RX MED GY IP 250 OP 250 PS 637: Performed by: STUDENT IN AN ORGANIZED HEALTH CARE EDUCATION/TRAINING PROGRAM

## 2023-06-13 PROCEDURE — 250N000012 HC RX MED GY IP 250 OP 636 PS 637: Performed by: STUDENT IN AN ORGANIZED HEALTH CARE EDUCATION/TRAINING PROGRAM

## 2023-06-13 PROCEDURE — A9500 TC99M SESTAMIBI: HCPCS | Performed by: INTERNAL MEDICINE

## 2023-06-13 PROCEDURE — G0378 HOSPITAL OBSERVATION PER HR: HCPCS

## 2023-06-13 PROCEDURE — 250N000013 HC RX MED GY IP 250 OP 250 PS 637: Performed by: INTERNAL MEDICINE

## 2023-06-13 RX ORDER — ISOSORBIDE MONONITRATE 60 MG/1
60 TABLET, EXTENDED RELEASE ORAL DAILY
Qty: 30 TABLET | Refills: 3 | Status: ON HOLD | OUTPATIENT
Start: 2023-06-14 | End: 2023-06-19

## 2023-06-13 RX ORDER — AMINOPHYLLINE 25 MG/ML
50 INJECTION, SOLUTION INTRAVENOUS
Status: ACTIVE | OUTPATIENT
Start: 2023-06-13 | End: 2023-06-13

## 2023-06-13 RX ORDER — CAFFEINE CITRATE 20 MG/ML
60 SOLUTION INTRAVENOUS
Status: ACTIVE | OUTPATIENT
Start: 2023-06-13 | End: 2023-06-13

## 2023-06-13 RX ORDER — REGADENOSON 0.08 MG/ML
0.4 INJECTION, SOLUTION INTRAVENOUS ONCE
Status: COMPLETED | OUTPATIENT
Start: 2023-06-13 | End: 2023-06-13

## 2023-06-13 RX ORDER — METOPROLOL SUCCINATE 25 MG/1
12.5 TABLET, EXTENDED RELEASE ORAL DAILY
Qty: 30 TABLET | Refills: 1 | Status: SHIPPED | OUTPATIENT
Start: 2023-06-14 | End: 2023-07-26

## 2023-06-13 RX ORDER — CAFFEINE 200 MG
200 TABLET ORAL
Status: ACTIVE | OUTPATIENT
Start: 2023-06-13 | End: 2023-06-13

## 2023-06-13 RX ORDER — ALBUTEROL SULFATE 0.83 MG/ML
2.5 SOLUTION RESPIRATORY (INHALATION)
Status: ACTIVE | OUTPATIENT
Start: 2023-06-13 | End: 2023-06-13

## 2023-06-13 RX ORDER — POLYETHYLENE GLYCOL 3350 17 G/17G
34 POWDER, FOR SOLUTION ORAL ONCE
Status: COMPLETED | OUTPATIENT
Start: 2023-06-13 | End: 2023-06-13

## 2023-06-13 RX ORDER — IBUPROFEN 200 MG
600 TABLET ORAL 3 TIMES DAILY PRN
Status: DISCONTINUED | OUTPATIENT
Start: 2023-06-13 | End: 2023-06-13 | Stop reason: HOSPADM

## 2023-06-13 RX ADMIN — PANTOPRAZOLE SODIUM 40 MG: 40 TABLET, DELAYED RELEASE ORAL at 09:11

## 2023-06-13 RX ADMIN — Medication 8.19 MILLICURIE: at 07:30

## 2023-06-13 RX ADMIN — Medication 81 MG: at 09:11

## 2023-06-13 RX ADMIN — METOPROLOL SUCCINATE 12.5 MG: 25 TABLET, EXTENDED RELEASE ORAL at 09:11

## 2023-06-13 RX ADMIN — FLUOXETINE 40 MG: 20 CAPSULE ORAL at 09:11

## 2023-06-13 RX ADMIN — PREDNISONE 5 MG: 5 TABLET ORAL at 09:11

## 2023-06-13 RX ADMIN — REGADENOSON 0.4 MG: 0.08 INJECTION, SOLUTION INTRAVENOUS at 08:20

## 2023-06-13 RX ADMIN — Medication 23.9 MILLICURIE: at 08:20

## 2023-06-13 RX ADMIN — ACETAMINOPHEN 650 MG: 325 TABLET ORAL at 09:11

## 2023-06-13 RX ADMIN — ENTECAVIR 0.5 MG: 0.5 TABLET ORAL at 09:23

## 2023-06-13 RX ADMIN — ISOSORBIDE MONONITRATE 60 MG: 30 TABLET, EXTENDED RELEASE ORAL at 09:11

## 2023-06-13 RX ADMIN — FLUTICASONE FUROATE AND VILANTEROL TRIFENATATE 1 PUFF: 100; 25 POWDER RESPIRATORY (INHALATION) at 09:20

## 2023-06-13 RX ADMIN — POLYETHYLENE GLYCOL 3350 17 G: 17 POWDER, FOR SOLUTION ORAL at 09:11

## 2023-06-13 RX ADMIN — MYCOPHENOLATE MOFETIL 750 MG: 250 CAPSULE ORAL at 09:21

## 2023-06-13 RX ADMIN — THERA TABS 1 TABLET: TAB at 09:11

## 2023-06-13 ASSESSMENT — ACTIVITIES OF DAILY LIVING (ADL)
ADLS_ACUITY_SCORE: 35

## 2023-06-13 NOTE — PROGRESS NOTES
PRIMARY DIAGNOSIS: CHEST PAIN  OUTPATIENT/OBSERVATION GOALS TO BE MET BEFORE DISCHARGE:  1. Ruled out acute coronary syndrome (negative or stable Troponin):  Yes  2. Pain Status: Pain free.  3. Appropriate provocative testing performed: No  - Stress Test Procedure: Nuclear  - Interpretation of cardiac rhythm per telemetry tech: SR with Pac's    4. Cleared by Consultants (if applicable):No  5. Return to near baseline physical activity: Yes  Discharge Planner Nurse   Safe discharge environment identified: Yes  Barriers to discharge: Yes       Entered by: Mireille Do RN 06/13/2023 5:24 AM     Please review provider order for any additional goals.   Nurse to notify provider when observation goals have been met and patient is ready for discharge.  Stress test today and possible discharge. Mireille Do RN

## 2023-06-13 NOTE — DISCHARGE SUMMARY
[facility]    Discharge Summary  Hospitalist    Date of Admission:  6/12/2023  Date of Discharge:  6/13/2023  Discharging Provider: Juan Bird MD, MD    Discharge Diagnoses   Principal Problem:    Chest pain, Probably chest wall in origin  Active Problems:    JULIANE (obstructive sleep apnea)    CAD -- S/P CABG x 3 in 2020    HTN (hypertension)      History of Present Illness   63 year old male who has a history of hypertension,HLD,  JULIANE, depression, CAD s/p CABG x 3 9/20/2020, NSTEMI, chronic hep B presented with left-sided chest pain which is sharp, 5 out of 10, radiating to left shoulder, non exertional and did not improve much with nitro sublingual.      In the ED, ECG did not show DAYDAY, troponin were negative x 2. Has CHAPPELL but no orthopnea, PND, or leg swelling. Cardiology was consulted in the ED. He will be admitted for observation overnight and a stress test     Hospital Course    No further chest pain, but palpation of left upper chest does cause discomfort which reproduces the pain.     Nuclear Stress test showed:     The nuclear stress test is abnormal.     There is a small area of mild ischemia in the apical segment(s) of the left ventricle.     There is a medium sized area of infarction with minimal telma-infarct ischemia in the inferior and inferolateral segment(s) of the left ventricle.     The left ventricular ejection fraction at stress is greater than 70%.     A prior study was conducted on 9/8/2022.  This study has no change when compared with the prior study.     The patient is at a low risk of future cardiac ischemic events.    Juan Bird MD  Pager: [phone]  Cell Phone:  [phone]       Significant Results and Procedures   As above    Pending Results   These results will be followed up by Dr. Bird  Unresulted Labs Ordered in the Past 30 Days of this Admission     No orders found for last 31 day(s).          Code Status   Full Code        Primary Care Physician   Aston Perry    Physical Exam   Temp: 99  F (37.2  C) Temp src: Oral BP: 114/75 Pulse: 73   Resp: 20 SpO2: (!) 88 % O2 Device: None (Room air)    Vitals:    06/12/23 1014 06/12/23 2159   Weight: 81.6 kg (180 lb) 81.9 kg (180 lb 8 oz)     Vital Signs with Ranges  Temp:  [97.9  F (36.6  C)-99  F (37.2  C)] 99  F (37.2  C)  Pulse:  [58-96] 73  Resp:  [16-46] 20  BP: (106-148)/(60-91) 114/75  SpO2:  [88 %-95 %] 88 %  I/O last 3 completed shifts:  In: 360 [P.O.:360]  Out: 675 [Urine:675]    Exam on discharge:   Lungs clear  CV with reg S1S2  Chest tender to palpation    Discharge Disposition   Discharged to home  Condition at discharge: Stable    Consultations This Hospital Stay   CARE MANAGEMENT / SOCIAL WORK IP CONSULT    Time Spent on this Encounter   I spent a total of 25 minutes discharging this patient.     Discharge Orders   No discharge procedures on file.  Discharge Medications   Current Discharge Medication List      START taking these medications    Details   metoprolol succinate ER (TOPROL XL) 25 MG 24 hr tablet Take 0.5 tablets (12.5 mg) by mouth daily  Qty: 30 tablet, Refills: 1    Associated Diagnoses: S/P CABG (coronary artery bypass graft)         CONTINUE these medications which have CHANGED    Details   isosorbide mononitrate (IMDUR) 60 MG 24 hr tablet Take 1 tablet (60 mg) by mouth daily  Qty: 30 tablet, Refills: 3    Associated Diagnoses: S/P CABG (coronary artery bypass graft)         CONTINUE these medications which have NOT CHANGED    Details   acetaminophen (TYLENOL) 325 MG tablet Take 1-2 tablets by mouth 4 times daily      albuterol (PROAIR HFA) 108 (90 Base) MCG/ACT inhaler Inhale 2 puffs into the lungs every 6 hours as needed for shortness of breath / dyspnea or wheezing  Qty: 1 g, Refills: 11    Comments: Pharmacy may dispense brand covered by insurance (Proair, or proventil or ventolin or generic albuterol inhaler)  Associated Diagnoses: Wheezing       aspirin 81 MG tablet Take 81 mg by mouth daily       budesonide (PULMICORT FLEXHALER) 90 MCG/ACT inhaler Inhale 2 puffs into the lungs 2 times daily as needed (PRN)  Qty: 1 each, Refills: 8    Associated Diagnoses: Wheezing      calcium citrate-vitamin D (CITRACAL) 315-200 MG-UNIT TABS Take 1 tablet by mouth daily.      entecavir (BARACLUDE) 0.5 MG tablet Take 1 tablet (0.5 mg) by mouth daily APPT NEEDED FOR FURTHER REFILLS  Qty: 90 tablet, Refills: 3    Associated Diagnoses: Chronic hepatitis B (H); Chronic viral hepatitis B without delta agent and without coma (H)      FLUoxetine (PROZAC) 40 MG capsule Take 1 capsule (40 mg) by mouth daily  Qty: 90 capsule, Refills: 2    Associated Diagnoses: Recurrent major depressive disorder, in partial remission (H)      fluticasone-salmeterol (ADVAIR) 250-50 MCG/ACT inhaler Inhale 1 puff into the lungs 2 times daily as needed (PRN)  Qty: 180 each, Refills: 3    Associated Diagnoses: Dyspnea on exertion; Wheezing      furosemide (LASIX) 20 MG tablet Take 1 tablet (20 mg) by mouth daily as needed (fluid retention)  Qty: 90 tablet, Refills: 1    Associated Diagnoses: Dyspnea on exertion      latanoprost (XALATAN) 0.005 % ophthalmic solution Place 1 drop into both eyes At Bedtime  Qty: 2.5 mL, Refills: 11    Associated Diagnoses: Borderline glaucoma with ocular hypertension, bilateral      Lidocaine (LIDOCARE) 4 % Patch Place 1 patch onto the skin daily as needed for moderate pain To prevent lidocaine toxicity, patient should be patch free for 12 hrs daily.      Multiple Vitamins-Iron (ONE DAILY MULTIVITAMIN/IRON) TABS Take 1 tablet by mouth daily      mycophenolate (GENERIC EQUIVALENT) 250 MG capsule Take 3 capsules (750 mg) by mouth 2 times daily  Qty: 540 capsule, Refills: 0    Comments: Appointment and labs needed  Associated Diagnoses: Kidney transplanted      nitroGLYcerin (NITROSTAT) 0.4 MG sublingual tablet Place 1 tablet (0.4 mg) under the tongue every 5 minutes as  needed for chest pain  Qty: 20 tablet, Refills: 3    Associated Diagnoses: Coronary arteriosclerosis due to lipid rich plaque      Omega-3 Fatty Acids (OMEGA 3 PO) Take 1 capsule by mouth daily Dose unknown      ondansetron (ZOFRAN ODT) 4 MG ODT tab Take 1 tablet (4 mg) by mouth every 6 hours as needed for nausea or vomiting    Associated Diagnoses: Hip pain, right      pantoprazole (PROTONIX) 40 MG EC tablet Take 1 tablet (40 mg) by mouth daily  Qty: 90 tablet, Refills: 3    Associated Diagnoses: Gastroesophageal reflux disease without esophagitis      polyethylene glycol (MIRALAX) 17 g packet Take 17 g by mouth daily as needed       predniSONE (DELTASONE) 5 MG tablet Take 1 tablet (5 mg) by mouth daily  Qty: 90 tablet, Refills: 3    Associated Diagnoses: Kidney transplanted      rosuvastatin (CRESTOR) 20 MG tablet TAKE 1 TABLET(20 MG) BY MOUTH DAILY  Qty: 90 tablet, Refills: 3    Associated Diagnoses: Pure hypercholesterolemia      tacrolimus (PROTOPIC) 0.1 % external ointment Apply topically 2 times daily as needed         STOP taking these medications       metoprolol tartrate (LOPRESSOR) 25 MG tablet Comments:   Reason for Stopping:             Allergies   Allergies   Allergen Reactions     Penicillins Shortness Of Breath and Hives     Keflex [Cephalexin Hcl] Other (See Comments)     Pt could not recall reaction     Tetracycline Other (See Comments)     Patient could not recall reaction     Sulfa Antibiotics Rash     Data   Most Recent 3 CBC's:Recent Labs   Lab Test 06/12/23  1049 04/17/23  1026 04/10/23  0629   WBC 8.1 6.3 8.0   HGB 14.6 14.3 12.6*   MCV 91 93 96    299 241      Most Recent 3 BMP's:  Recent Labs   Lab Test 06/12/23  1049 04/17/23  1026 04/10/23  0629    140 139   POTASSIUM 3.9 4.0 4.1   CHLORIDE 105 100 103   CO2 27 24 24   BUN 9.4 11.3 16.4   CR 0.69 0.81 0.72   ANIONGAP 10 16* 12   HEMA 9.7 9.9 9.4   GLC 93 74 99     Most Recent 2 LFT's:  Recent Labs   Lab Test 06/12/23  1049  04/17/23  1026   AST 30 24   ALT 24 19   ALKPHOS 67 70   BILITOTAL 0.8 0.6     Most Recent INR's and Anticoagulation Dosing History:  Anticoagulation Dose History         Latest Ref Rng & Units 3/13/2018 5/28/2020 6/3/2020 6/8/2020   Recent Dosing and Labs   INR 0.85 - 1.15 0.95   0.98   1.05   1.43      1.66         6/9/2020 4/12/2021 7/19/2022   Recent Dosing and Labs   INR 1.36   1.03   1.04           Multiple values from one day are sorted in reverse-chronological order           Most Recent 3 Troponin's:  Recent Labs   Lab Test 08/02/21  1301 05/23/15  1032 05/23/15  0031 05/22/15  2051 05/22/15  0923 05/22/15  0918   TROPI  --  0.373* 0.405* 0.350*   < >  --    TROPONIN <0.015  --   --   --   --  0.01    < > = values in this interval not displayed.     Most Recent Cholesterol Panel:  Recent Labs   Lab Test 10/21/22  0908   CHOL 151   LDL 67   HDL 65   TRIG 94     Most Recent 6 Bacteria Isolates From Any Culture (See EPIC Reports for Culture Details):  Recent Labs   Lab Test 11/08/18  1020 11/04/18  1427 11/04/18  1422 05/24/15  1305   CULT No growth No growth No growth No Salmonella, Shigella, Campylobacter, E. coli O157, Aeromonas, or Plesiomonas   isolated.       Most Recent TSH, T4 and A1c Labs:  Recent Labs   Lab Test 10/21/22  0908 09/02/21  1454   TSH  --  0.68   A1C 6.0*  --

## 2023-06-13 NOTE — PROGRESS NOTES
PRIMARY DIAGNOSIS: CHEST PAIN  OUTPATIENT/OBSERVATION GOALS TO BE MET BEFORE DISCHARGE:  1. Ruled out acute coronary syndrome (negative or stable Troponin):  Yes  2. Pain Status: Pain free.  3. Appropriate provocative testing performed: Yes  - Stress Test Procedure: Nuclear Patient went for first part this AM,  Imaging to be done after he eats breakfast  - Interpretation of cardiac rhythm per telemetry tech: SR with frequent PACS    4. Cleared by Consultants (if applicable):No  5. Return to near baseline physical activity: Yes  Discharge Planner Nurse   Safe discharge environment identified: Yes  Barriers to discharge: Yes Waiting for final results of stress test       Entered by: Sujatha Bennett RN 06/13/2023 11:13 AM     Please review provider order for any additional goals.   Nurse to notify provider when observation goals have been met and patient is ready for discharge.

## 2023-06-13 NOTE — PROGRESS NOTES
Assumed care for pt at [id number]  Pt is alert and oriented, VSS. Pt continues to have pain in left chest, has improved now and rates it 2/10. PRN tylenol given for headache and reported some relief upon reassessment. Pt is compliant and pleasant, no other concerns at this time. Report given to P3 RN, pt transferring to room 320.

## 2023-06-13 NOTE — PLAN OF CARE
Relief with Tyl PRN this AM for headache. Pt had stress test this AM and returned to room to eat prior to imaging.    Problem: Plan of Care - These are the overarching goals to be used throughout the patient stay.    Goal: Plan of Care Review  Description: The Plan of Care Review/Shift note should be completed every shift.  The Outcome Evaluation is a brief statement about your assessment that the patient is improving, declining, or no change.  This information will be displayed automatically on your shift note.  Outcome: Progressing  Flowsheets (Taken 6/13/2023 1056)  Plan of Care Reviewed With: patient     Problem: Chest Pain  Goal: Resolution of Chest Pain Symptoms  Outcome: Progressing     Problem: Plan of Care - These are the overarching goals to be used throughout the patient stay.    Goal: Absence of Hospital-Acquired Illness or Injury  Intervention: Identify and Manage Fall Risk  Recent Flowsheet Documentation  Taken 6/13/2023 0800 by Sujatha Bennett RN  Safety Promotion/Fall Prevention:   activity supervised   assistive device/personal items within reach   clutter free environment maintained   nonskid shoes/slippers when out of bed   safety round/check completed   Goal Outcome Evaluation:      Plan of Care Reviewed With: patient

## 2023-06-13 NOTE — PLAN OF CARE
"Goal Outcome Evaluation:      Plan of Care Reviewed With: patient    Overall Patient Progress: no changeOverall Patient Progress: no change       NPO for stress test. Cards following. Mireille Do RN   Vitals:    06/12/23 1824 06/12/23 2159 06/12/23 2332 06/13/23 0357   BP:  132/84 113/76 106/70   BP Location:  Left arm Left arm Right arm   Patient Position:  Sitting Sitting    Cuff Size:  Adult Regular Adult Regular    Pulse: 80 86 72 69   Resp: (!) 35 20 20 20   Temp:  97.9  F (36.6  C) 97.9  F (36.6  C) 98.4  F (36.9  C)   TempSrc:  Oral Oral Oral   SpO2: 90% 94% 94% 92%   Weight:  81.9 kg (180 lb 8 oz)     Height:  1.727 m (5' 8\")      Has a mild headache had some tylenol with some improvement. Room air. SBA.     "

## 2023-06-13 NOTE — PROGRESS NOTES
Nuclear Medicine Lexiscan Stress Test:  Sitting protocol.  0.4 mg lexiscan administered.  Peak VS:  HR:  94  BP:  146/96.  Symptoms:  Mild SOB, nausea with some stomach cramping and headache which was resolving in recovery with drinking coffee.  Nuclear imaging and interpretation pending.

## 2023-06-15 ENCOUNTER — OFFICE VISIT (OUTPATIENT)
Dept: FAMILY MEDICINE | Facility: CLINIC | Age: 63
End: 2023-06-15
Payer: COMMERCIAL

## 2023-06-15 VITALS
TEMPERATURE: 98 F | HEIGHT: 67 IN | BODY MASS INDEX: 28.28 KG/M2 | OXYGEN SATURATION: 93 % | HEART RATE: 79 BPM | WEIGHT: 180.2 LBS | RESPIRATION RATE: 16 BRPM | SYSTOLIC BLOOD PRESSURE: 122 MMHG | DIASTOLIC BLOOD PRESSURE: 84 MMHG

## 2023-06-15 DIAGNOSIS — Z95.1 S/P CABG (CORONARY ARTERY BYPASS GRAFT): ICD-10-CM

## 2023-06-15 DIAGNOSIS — I25.118 CORONARY ARTERY DISEASE OF NATIVE ARTERY OF NATIVE HEART WITH STABLE ANGINA PECTORIS (H): ICD-10-CM

## 2023-06-15 DIAGNOSIS — D84.9 IMMUNOSUPPRESSION (H): ICD-10-CM

## 2023-06-15 DIAGNOSIS — F33.0 MILD EPISODE OF RECURRENT MAJOR DEPRESSIVE DISORDER (H): ICD-10-CM

## 2023-06-15 DIAGNOSIS — Z01.818 PREOPERATIVE EXAMINATION: Primary | ICD-10-CM

## 2023-06-15 DIAGNOSIS — K21.9 GASTROESOPHAGEAL REFLUX DISEASE WITHOUT ESOPHAGITIS: ICD-10-CM

## 2023-06-15 DIAGNOSIS — N18.6 END STAGE RENAL DISEASE (H): ICD-10-CM

## 2023-06-15 DIAGNOSIS — M87.052 AVASCULAR NECROSIS OF BONES OF BOTH HIPS (H): ICD-10-CM

## 2023-06-15 DIAGNOSIS — M87.051 AVASCULAR NECROSIS OF BONES OF BOTH HIPS (H): ICD-10-CM

## 2023-06-15 DIAGNOSIS — B18.1 CHRONIC HEPATITIS B (H): ICD-10-CM

## 2023-06-15 PROCEDURE — 99214 OFFICE O/P EST MOD 30 MIN: CPT | Performed by: INTERNAL MEDICINE

## 2023-06-15 ASSESSMENT — ENCOUNTER SYMPTOMS
DIZZINESS: 0
FATIGUE: 0
PALPITATIONS: 0
CHILLS: 0
CHEST TIGHTNESS: 0
FEVER: 0
SHORTNESS OF BREATH: 0
LIGHT-HEADEDNESS: 0

## 2023-06-15 ASSESSMENT — PATIENT HEALTH QUESTIONNAIRE - PHQ9: SUM OF ALL RESPONSES TO PHQ QUESTIONS 1-9: 12

## 2023-06-15 ASSESSMENT — PAIN SCALES - GENERAL: PAINLEVEL: MILD PAIN (2)

## 2023-06-15 NOTE — PATIENT INSTRUCTIONS
Avoid aspirin 7 days before the surgery. Avoid nonsteroidal anti-inflammatory pain medication like ibuprofen, Motrin, or Aleve 7 days before the surgery.  Tylenol can be used for pain.  Avoid any over the counter multivitamins or herbal supplement 7 days before surgery   You can resume these medications after surgery    Please call the surgeon's office to inquire about Aspirin.   You can take all of your prescription medications on the day of surgery with a sip of water except Lasix and Miralax.

## 2023-06-15 NOTE — PROGRESS NOTES
[address], [address]  [facility] [address]  Phone: [phone]  Primary Provider: Aston Perry  Pre-op Performing Provider: RACHAEL COSTELLO      PREOPERATIVE EVALUATION:  Today's date: 6/15/2023    Jerad Ross is a 63 year old male who presents for a preoperative evaluation.    Surgical Information:  Surgery/Procedure: RIGHT HIP REVISION  Surgery Location:  OR  Surgeon: Juan Mcdonough  Surgery Date: 06/19/2023  Time of Surgery: 7:30 AM  Where patient plans to recover: TCU  Fax number for surgical facility: Note does not need to be faxed, will be available electronically in Epic.    Assessment & Plan     The proposed surgical procedure is considered HIGH risk.    Preoperative examination  High risk patient for an intermediate risk procedure.   ASA per surgeon. Discussed with his cardiologist, ok to hold ASA for 4 days before surgery, per surgeon's discretion.  New diagnosis of JULIANE. Consider telma-operative bipap/CPAP.      Avascular necrosis of bones of both hips (H)  Revision surgery right hip joint.    End stage renal disease (H)  S/p renal transplant. On immunosuppressive therapy.     Immunosuppression (H)  Stable.     S/P CABG (coronary artery bypass graft)  On ASA 81.   On lasix - hold on day of surgery  On Imdur, metoprolol, statin.    Coronary artery disease of native artery of native heart with stable angina pectoris (H)  Stable.     Chronic hepatitis B (H)  On entecavir. Continue medication.    Gastroesophageal reflux disease without esophagitis  Stable. Continue medication.    Mild episode of recurrent major depressive disorder (H)  Stable. Continue medication.     - No identified additional risk factors other than previously addressed    Antiplatelet or Anticoagulation Medication Instructions:   - aspirin: per surgeon's discretion.    Additional Medication Instructions:  .  Avoid aspirin 7 days before the surgery. Avoid nonsteroidal  anti-inflammatory pain medication like ibuprofen, Motrin, or Aleve 7 days before the surgery.  Tylenol can be used for pain.  Avoid any over the counter multivitamins or herbal supplement 7 days before surgery   You can resume these medications after surgery    Please call the surgeon's office to inquire about Aspirin.   You can take all of your prescription medications on the day of surgery with a sip of water except Lasix and Miralax.     RECOMMENDATION:  APPROVAL GIVEN to proceed with proposed procedure, without further diagnostic evaluation.      Subjective       HPI related to upcoming procedure:   Jerad is a very pleasant 63 year old gentleman who presented for preop exam.   He has CAD, s/p CABG in 2020, currently on ASA 81, ESRD s/p kidney transplant, immunosuppressive medications, chronic hep B, GERD, depression, JULIANE, asthma.  He was recently at the hospital for sharp chest pain, EKG, troponins, nuclear stress test were unremarkable.     He has mild shortness of breath on climbing stairs.     Patient denies palpitations, headache, lightheadedness, syncope, fever or chills. Patient is able to climb 1 flight of stairs without having chest pain.  Patient denies personal or family history of complications with anesthesia. Patient does not have a history of heart failure or TIA/stroke. Patient does not smoke or drink alcohol.          6/14/2023    11:44 AM   Preop Questions   1. Have you ever had a heart attack or stroke? YES - s/p CABG   2. Have you ever had surgery on your heart or blood vessels, such as a stent placement, a coronary artery bypass, or surgery on an artery in your head, neck, heart, or legs? YES - s/p CABG   3. Do you have chest pain with activity? YES - patient says no   4. Do you have a history of  heart failure? YES - no EF normal    5. Do you currently have a cold, bronchitis or symptoms of other infection? No   6. Do you have a cough, shortness of breath, or wheezing? YES - sob on going up  the stairs.   7. Do you or anyone in your family have previous history of blood clots? No   8. Do you or does anyone in your family have a serious bleeding problem such as prolonged bleeding following surgeries or cuts? No   9. Have you ever had problems with anemia or been told to take iron pills? YES - past hx    10. Have you had any abnormal blood loss such as black, tarry or bloody stools? No   11. Have you ever had a blood transfusion? YES    11a. Have you ever had a transfusion reaction? No   12. Are you willing to have a blood transfusion if it is medically needed before, during, or after your surgery? Yes   13. Have you or any of your relatives ever had problems with anesthesia? No   14. Do you have sleep apnea, excessive snoring or daytime drowsiness? YES - recent diagnosis   14a. Do you have a CPAP machine? No   15. Do you have any artifical heart valves or other implanted medical devices like a pacemaker, defibrillator, or continuous glucose monitor? No   16. Do you have artificial joints? YES - both hips    17. Are you allergic to latex? No       Health Care Directive:  Patient does not have a Health Care Directive or Living Will:     Preoperative Review of :   reviewed - no record of controlled substances prescribed.    Review of Systems   Constitutional: Negative for chills, fatigue and fever.   Respiratory: Negative for chest tightness and shortness of breath.    Cardiovascular: Positive for chest pain. Negative for palpitations.   Neurological: Negative for dizziness, syncope and light-headedness.       Patient Active Problem List    Diagnosis Date Noted     Preoperative examination 06/15/2023     Priority: Medium     Coronary artery disease of native artery of native heart with stable angina pectoris (H) 06/15/2023     Priority: Medium     Chest pain, Probably chest wall in origin 06/12/2023     Priority: Medium     Avascular necrosis of bones of both hips (H) 04/18/2023     Priority: Medium      Hip pain, right 04/08/2023     Priority: Medium     HTN (hypertension) 11/29/2022     Priority: Medium     End stage renal disease (H) 11/29/2022     Priority: Medium     Abnormal cardiovascular stress test 09/12/2021     Priority: Medium     CAD -- S/P CABG x 3 in 2020 07/26/2021     Priority: Medium     Acute midline low back pain without sciatica 11/04/2018     Priority: Medium     Septic bursitis 11/04/2018     Priority: Medium     Impaired mobility 11/04/2018     Priority: Medium     Aftercare following organ transplant 06/19/2017     Priority: Medium     Immunosuppression (H)      Priority: Medium     Hypogonadism in male      Priority: Medium     GERD (gastroesophageal reflux disease)      Priority: Medium     Chronic hepatitis B (H) 07/19/2016     Priority: Medium     Inflamed seborrheic keratosis 01/23/2016     Priority: Medium     Intertrigo 01/23/2016     Priority: Medium     Hip pain 06/19/2015     Priority: Medium     Depression      Priority: Medium     Diverticulosis      Priority: Medium     Hemorrhoids      Priority: Medium     Kidney replaced by transplant      Priority: Medium     Basal cell carcinoma      Priority: Medium     Squamous cell carcinoma      Priority: Medium     Dyslipidemia      Priority: Medium     CRP elevated      Priority: Medium     Glaucoma      Priority: Medium     OHTN       AION (acute ischemic optic neuropathy)      Priority: Medium     Paracentral scotoma      Priority: Medium     LE       Coronary arteriosclerosis due to lipid rich plaque 06/17/2014     Priority: Medium     Coronary angiogram 6/17/14: Severe distal 3-vessel disease involving small vessels, not amenable to PCI or CABG.       NSTEMI (non-ST elevated myocardial infarction) (H) 06/17/2014     Priority: Medium     6/17/14: Coronary angiogram showed diffuse 3-vessel disease involving small distal vessels, not amenable to revascularization. Started on medical therapy.        JULIANE (obstructive sleep apnea)       Priority: Medium     HTN, kidney transplant related      Priority: Medium     BCC (basal cell carcinoma), trunk 05/02/2012     Priority: Medium      Past Medical History:   Diagnosis Date     Acute midline low back pain without sciatica      AION (acute ischemic optic neuropathy)      Anemia in chronic renal disease      Arthritis      Avascular necrosis of bones of both hips (H)     s/p bilateral hip replacements     Avascular necrosis of bones of both hips (H)     s/p bilateral hip replacements     Basal cell carcinoma      Chronic hepatitis B (H)      Clostridium difficile colitis      COPD (chronic obstructive pulmonary disease) (H)      Coronary artery disease involving native coronary artery of native heart without angina pectoris 06/17/2014    Coronary angiogram 6/17/14: Severe distal 3-vessel disease involving small vessels, not amenable to PCI or CABG.     CRP elevated      Depression      Depressive disorder      Diverticulosis      Dyslipidemia      Elevated C-reactive protein (CRP)      FSGS (focal segmental glomerulosclerosis)      FSGS (focal segmental glomerulosclerosis)      Gastric ulcer with hemorrhage 02/12/2012     Gastric ulcer with hemorrhage 02/12/2012     GERD (gastroesophageal reflux disease)      Glaucoma     OHTN     Glaucoma      Hemorrhoids      Hemorrhoids      History of blood transfusion      HTN (hypertension)      Hyperlipidemia      Hypertension secondary to other renal disorders      Hypogonadism in male      Immunosuppressed status (H)      Kidney replaced by transplant     focal glomerulosclerosis      Kidney transplanted     focal glomerulosclerosis      NSTEMI (non-ST elevated myocardial infarction) (H) 06/17/2014     NSTEMI (non-ST elevated myocardial infarction) (H) 06/17/2014     JULIANE (obstructive sleep apnea)     Doesn't use CPAP     Paracentral scotoma     LE     Secondary renal hyperparathyroidism (H)      Secondary renal hyperparathyroidism (H)      Septic bursitis       Squamous cell carcinoma      Uncomplicated asthma      Past Surgical History:   Procedure Laterality Date     APPENDECTOMY       BIOPSY       Cardiac Bypass surgery  06/08/2020    [facility]      CATARACT EXTRACTION EXTRACAPSULAR W/ INTRAOCULAR LENS IMPLANTATION Bilateral 4-20-10, 6-1-10     CATARACT IOL, RT/LT  04/19/2000    RE     CATARACT IOL, RT/LT  06/01/2000    LE     COLECTOMY PARTIAL  01/01/1983     10 cm, diverticulitis      COLECTOMY SUBTOTAL  1983    10 cm, diverticulitis     COLONOSCOPY  02/13/2012    Procedure:COLONOSCOPY; Surgeon:SLOAN GALLARDO; Location: GI     COLONOSCOPY N/A 01/22/2020    Procedure: Colonoscopy, With Polypectomy And Biopsy;  Surgeon: Aston Kiran MD;  Location: [facility]     CV CORONARY ANGIOGRAM N/A 06/02/2020    Procedure: Coronary Angiogram;  Surgeon: Alona Florentino MD;  Location: [facility];  Service: Cardiology     CV CORONARY ANGIOGRAM N/A 09/20/2021    Procedure: Coronary Angiogram;  Surgeon: Adam Agudelo MD;  Location: [facility] CV     CV LEFT HEART CATHETERIZATION WITHOUT LEFT VENTRICULOGRAM Left 06/02/2020    Procedure: Left Heart Catheterization Without Left Ventriculogram;  Surgeon: Alona Florentino MD;  Location: [facility];  Service: Cardiology     CV SUPRAVALVULAR AORTOGRAM N/A 09/20/2021    Procedure: Supravalvular Aortagram;  Surgeon: Adam Agudelo MD;  Location: [facility] CV     ESOPHAGOSCOPY, GASTROSCOPY, DUODENOSCOPY (EGD), COMBINED  02/13/2012    Procedure:COMBINED ESOPHAGOSCOPY, GASTROSCOPY, DUODENOSCOPY (EGD); Surgeon:SLOAN GALLARDO; Location: GI     ESOPHAGOSCOPY, GASTROSCOPY, DUODENOSCOPY (EGD), COMBINED  02/13/2012     EXTRACAPSULAR CATARACT EXTRATION WITH INTRAOCULAR LENS IMPLANT  4-20-10, 6-1-10    Rt, Lt     HERNIA REPAIR  1995    Lt inguinal     HERNIA REPAIR  01/01/1995    Lt inguinal     HIP SURGERY      1981, bilat MITZI, revised 2001, 2005     HIP SURGERY   01/01/1981    bilat MITZI, revised 2001, 2005     IR FINE NEEDLE ASPIRATION W ULTRASOUND  04/10/2023     KIDNEY SURGERY  1978 and 1993    transplant     KNEE SURGERY  1983, 1987    bilat TKA     MOHS MICROGRAPHIC PROCEDURE       MOHS MICROGRAPHIC PROCEDURE       ORTHOPEDIC SURGERY       OTHER SURGICAL HISTORY      kidney [id number], 1993     PHACOEMULSIFICATION CLEAR CORNEA WITH STANDARD INTRAOCULAR LENS IMPLANT Right 04/19/2000     PHACOEMULSIFICATION CLEAR CORNEA WITH STANDARD INTRAOCULAR LENS IMPLANT Left 06/01/2000     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, auxiliary spleen     TONSILLECTOMY       TRANSPLANT       VASCULAR SURGERY  1976     Current Outpatient Medications   Medication Sig Dispense Refill     acetaminophen (TYLENOL) 325 MG tablet Take 1-2 tablets by mouth 4 times daily       albuterol (PROAIR HFA) 108 (90 Base) MCG/ACT inhaler Inhale 2 puffs into the lungs every 6 hours as needed for shortness of breath / dyspnea or wheezing 1 g 11     aspirin 81 MG tablet Take 81 mg by mouth daily        budesonide (PULMICORT FLEXHALER) 90 MCG/ACT inhaler Inhale 2 puffs into the lungs 2 times daily as needed (PRN) 1 each 8     calcium citrate-vitamin D (CITRACAL) 315-200 MG-UNIT TABS Take 1 tablet by mouth daily.       entecavir (BARACLUDE) 0.5 MG tablet Take 1 tablet (0.5 mg) by mouth daily APPT NEEDED FOR FURTHER REFILLS 90 tablet 3     FLUoxetine (PROZAC) 40 MG capsule Take 1 capsule (40 mg) by mouth daily 90 capsule 2     fluticasone-salmeterol (ADVAIR) 250-50 MCG/ACT inhaler Inhale 1 puff into the lungs 2 times daily as needed (PRN) 180 each 3     furosemide (LASIX) 20 MG tablet Take 1 tablet (20 mg) by mouth daily as needed (fluid retention) 90 tablet 1     isosorbide mononitrate (IMDUR) 60 MG 24 hr tablet Take 1 tablet (60 mg) by mouth daily 30 tablet 3     latanoprost (XALATAN) 0.005 % ophthalmic solution Place 1 drop into both eyes At Bedtime 2.5 mL 11     Lidocaine (LIDOCARE) 4 % Patch Place 1 patch onto  the skin daily as needed for moderate pain To prevent lidocaine toxicity, patient should be patch free for 12 hrs daily.       metoprolol succinate ER (TOPROL XL) 25 MG 24 hr tablet Take 0.5 tablets (12.5 mg) by mouth daily 30 tablet 1     Multiple Vitamins-Iron (ONE DAILY MULTIVITAMIN/IRON) TABS Take 1 tablet by mouth daily       mycophenolate (GENERIC EQUIVALENT) 250 MG capsule Take 3 capsules (750 mg) by mouth 2 times daily 540 capsule 0     nitroGLYcerin (NITROSTAT) 0.4 MG sublingual tablet Place 1 tablet (0.4 mg) under the tongue every 5 minutes as needed for chest pain 20 tablet 3     Omega-3 Fatty Acids (OMEGA 3 PO) Take 1 capsule by mouth daily Dose unknown       pantoprazole (PROTONIX) 40 MG EC tablet Take 1 tablet (40 mg) by mouth daily 90 tablet 3     polyethylene glycol (MIRALAX) 17 g packet Take 17 g by mouth daily as needed        predniSONE (DELTASONE) 5 MG tablet Take 1 tablet (5 mg) by mouth daily 90 tablet 3     rosuvastatin (CRESTOR) 20 MG tablet TAKE 1 TABLET(20 MG) BY MOUTH DAILY 90 tablet 3     tacrolimus (PROTOPIC) 0.1 % external ointment Apply topically 2 times daily as needed         Allergies   Allergen Reactions     Penicillins Shortness Of Breath and Hives     Keflex [Cephalexin Hcl] Other (See Comments)     Pt could not recall reaction     Tetracycline Other (See Comments)     Patient could not recall reaction     Sulfa Antibiotics Rash        Social History     Tobacco Use     Smoking status: Former     Packs/day: 1.00     Years: 9.00     Pack years: 9.00     Types: Cigarettes     Start date: 1980     Quit date: 1988     Years since quittin.4     Smokeless tobacco: Former   Vaping Use     Vaping status: Never Used   Substance Use Topics     Alcohol use: Not Currently     Comment: rarely 1 drink per month     History   Drug Use Unknown         Objective     /84 (BP Location: Left arm, Patient Position: Sitting, Cuff Size: Adult Large)   Pulse 79   Temp 98  F (36.7  " C) (Oral)   Resp 16   Ht 1.689 m (5' 6.5\")   Wt 81.7 kg (180 lb 3.2 oz)   SpO2 93%   BMI 28.65 kg/m      Physical Exam  Vitals reviewed.   Constitutional:       Appearance: Normal appearance.   HENT:      Mouth/Throat:      Mouth: Mucous membranes are moist.      Pharynx: Oropharynx is clear. No oropharyngeal exudate or posterior oropharyngeal erythema.   Cardiovascular:      Rate and Rhythm: Normal rate and regular rhythm.      Heart sounds: Normal heart sounds. No murmur heard.     No gallop.   Pulmonary:      Effort: Pulmonary effort is normal. No respiratory distress.      Breath sounds: Normal breath sounds. No stridor. No wheezing, rhonchi or rales.   Musculoskeletal:         General: Normal range of motion.      Right lower leg: No edema.      Left lower leg: No edema.   Neurological:      General: No focal deficit present.      Mental Status: He is alert.   Psychiatric:         Mood and Affect: Mood normal.       Recent Labs   Lab Test 06/12/23  1049 04/17/23  1026 04/06/23  1401 10/21/22  0908 07/19/22  1036   HGB 14.6 14.3   < > 14.3 14.4    299   < > 315 281   INR  --   --   --   --  1.04    140   < > 141 141   POTASSIUM 3.9 4.0   < > 4.0 3.2*   CR 0.69 0.81   < > 0.83 0.89   A1C  --   --   --  6.0*  --     < > = values in this interval not displayed.        Diagnostics:  No labs were ordered during this visit.   No EKG this visit, completed in the last 90 days.    Revised Cardiac Risk Index (RCRI):  The patient has the following serious cardiovascular risks for perioperative complications:   - High risk surgery (>5% cardiac complication risk) = 1 point   - Coronary Artery Disease (MI, positive stress test, angina, Qs on EKG) = 1 point   - Serum Creatinine >2.0 mg/dl = 1 point     RCRI Interpretation: 3 points: Class IV (high risk - >11% complication rate)    Estimated Functional Capacity: CANNOT perform 4 METS without symptoms he gets short of breath with stairs.        Signed " Electronically by: RACHAEL COSTELLO MD  Copy of this evaluation report is provided to requesting physician.

## 2023-06-16 NOTE — PROGRESS NOTES
PTA medications updated by Medication Scribe prior to surgery via phone call with patient (last doses completed by Nurse)     Medication history sources: Patient, Surescripts and H&P  In the past week, patient estimated taking medication this percent of the time: Greater than 90%      Significant changes made to the medication list:  None      Additional medication history information:   Patient was advised to bring: Advair Inhaler, Latanoprost Eye Solution, Pulmicort Inhaler, Albuterol Inhaler    Medication reconciliation completed by provider prior to medication history? No    Time spent in this activity: 45 minutes    The information provided in this note is only as accurate as the sources available at the time of update(s)    Prior to Admission medications    Medication Sig Last Dose Taking? Auth Provider Long Term End Date   acetaminophen (TYLENOL) 500 MG tablet Take 1-2 tablets by mouth 4 times daily  at AM Yes Reported, Patient     albuterol (PROAIR HFA) 108 (90 Base) MCG/ACT inhaler Inhale 2 puffs into the lungs every 6 hours as needed for shortness of breath / dyspnea or wheezing Unknown at PRN Yes Aston Perry MD Yes    aspirin 81 MG tablet Take 81 mg by mouth daily  6/15/2023 at AM Yes Reported, Patient     budesonide (PULMICORT FLEXHALER) 90 MCG/ACT inhaler Inhale 2 puffs into the lungs 2 times daily as needed (PRN) Unknown at PRN Yes Nolan Santoyo, APRN CNP Yes    calcium citrate-vitamin D (CITRACAL) 315-200 MG-UNIT TABS Take 1 tablet by mouth daily. 6/15/2023 at AM Yes Reported, Patient     entecavir (BARACLUDE) 0.5 MG tablet Take 1 tablet (0.5 mg) by mouth daily APPT NEEDED FOR FURTHER REFILLS  at AM Yes Lina Claros MD Yes    FLUoxetine (PROZAC) 40 MG capsule Take 1 capsule (40 mg) by mouth daily  at AM Yes Genia Fraser, APRN CNP Yes    fluticasone-salmeterol (ADVAIR) 250-50 MCG/ACT inhaler Inhale 1 puff into the lungs 2 times daily as needed (PRN)  Unknown at PRN Yes Nolan Santoyo, APRN CNP Yes    furosemide (LASIX) 20 MG tablet Take 1 tablet (20 mg) by mouth daily as needed (fluid retention) Unknown at PRN Yes Aston Perry MD Yes    isosorbide mononitrate (IMDUR) 60 MG 24 hr tablet Take 1 tablet (60 mg) by mouth daily  Patient taking differently: Take 30 mg by mouth 2 times daily (Taking 30 mg tablets BID from previous Rx until complete)  at AM Yes Juan Harris MD Yes    latanoprost (XALATAN) 0.005 % ophthalmic solution Place 1 drop into both eyes At Bedtime  at PM Yes Wood Miller MD Yes    Lidocaine (LIDOCARE) 4 % Patch Place 1 patch onto the skin daily as needed for moderate pain To prevent lidocaine toxicity, patient should be patch free for 12 hrs daily. Unknown at PRN Yes Unknown, Entered By History     metoprolol succinate ER (TOPROL XL) 25 MG 24 hr tablet Take 0.5 tablets (12.5 mg) by mouth daily  at AM Yes Juan Harris MD Yes    Multiple Vitamins-Iron (ONE DAILY MULTIVITAMIN/IRON) TABS Take 1 tablet by mouth daily 6/15/2023 at AM Yes Unknown, Entered By History     mycophenolate (GENERIC EQUIVALENT) 250 MG capsule Take 3 capsules (750 mg) by mouth 2 times daily  at AM Yes Pradip Singleton MD Yes    nitroGLYcerin (NITROSTAT) 0.4 MG sublingual tablet Place 1 tablet (0.4 mg) under the tongue every 5 minutes as needed for chest pain Unknown at PRN Yes Sascha Lundberg MD Yes    Omega-3 Fatty Acids (OMEGA 3 PO) Take 1 capsule by mouth daily Dose unknown 6/15/2023 at AM Yes Reported, Patient     pantoprazole (PROTONIX) 40 MG EC tablet Take 1 tablet (40 mg) by mouth daily  at AM Yes Aston Perry MD     polyethylene glycol (MIRALAX) 17 g packet Take 17 g by mouth daily as needed  6/16/2023 at AM Yes Reported, Patient No    predniSONE (DELTASONE) 5 MG tablet Take 1 tablet (5 mg) by mouth daily  at AM Yes Nolan Lovett MD Yes    rosuvastatin (CRESTOR) 20 MG tablet TAKE 1 TABLET(20  MG) BY MOUTH DAILY  at AM Yes Sascha Lundberg MD Yes    tacrolimus (PROTOPIC) 0.1 % external ointment Apply topically 2 times daily as needed Unknown at PRN Yes Unknown, Entered By History       Medication history completed by:    Sylvain Vela CPhT  Medication [facility]

## 2023-06-19 ENCOUNTER — APPOINTMENT (OUTPATIENT)
Dept: PHYSICAL THERAPY | Facility: CLINIC | Age: 63
DRG: 467 | End: 2023-06-19
Attending: ORTHOPAEDIC SURGERY
Payer: COMMERCIAL

## 2023-06-19 ENCOUNTER — ANESTHESIA EVENT (OUTPATIENT)
Dept: SURGERY | Facility: CLINIC | Age: 63
DRG: 467 | End: 2023-06-19
Payer: COMMERCIAL

## 2023-06-19 ENCOUNTER — ANESTHESIA (OUTPATIENT)
Dept: SURGERY | Facility: CLINIC | Age: 63
DRG: 467 | End: 2023-06-19
Payer: COMMERCIAL

## 2023-06-19 ENCOUNTER — APPOINTMENT (OUTPATIENT)
Dept: GENERAL RADIOLOGY | Facility: CLINIC | Age: 63
DRG: 467 | End: 2023-06-19
Attending: ORTHOPAEDIC SURGERY
Payer: COMMERCIAL

## 2023-06-19 ENCOUNTER — HOSPITAL ENCOUNTER (INPATIENT)
Facility: CLINIC | Age: 63
LOS: 4 days | Discharge: NURSING, REHAB OR LONG-TERM CARE | DRG: 467 | End: 2023-06-23
Attending: ORTHOPAEDIC SURGERY | Admitting: ORTHOPAEDIC SURGERY
Payer: COMMERCIAL

## 2023-06-19 DIAGNOSIS — Z98.890 POSTOPERATIVE STATE: ICD-10-CM

## 2023-06-19 DIAGNOSIS — M25.551 HIP PAIN, RIGHT: Primary | ICD-10-CM

## 2023-06-19 DIAGNOSIS — Z95.1 S/P CABG (CORONARY ARTERY BYPASS GRAFT): ICD-10-CM

## 2023-06-19 PROBLEM — T84.018S FAILED TOTAL HIP ARTHROPLASTY, SEQUELA: Status: ACTIVE | Noted: 2023-06-19

## 2023-06-19 PROBLEM — Z96.649 FAILED TOTAL HIP ARTHROPLASTY, SEQUELA: Status: ACTIVE | Noted: 2023-06-19

## 2023-06-19 LAB
ABO/RH(D): NORMAL
ANION GAP SERPL CALCULATED.3IONS-SCNC: 11 MMOL/L (ref 7–15)
ANTIBODY SCREEN: NEGATIVE
APTT PPP: 27 SECONDS (ref 22–38)
ATRIAL RATE - MUSE: 67 BPM
BUN SERPL-MCNC: 8.7 MG/DL (ref 8–23)
CALCIUM SERPL-MCNC: 8 MG/DL (ref 8.8–10.2)
CHLORIDE SERPL-SCNC: 110 MMOL/L (ref 98–107)
CREAT SERPL-MCNC: 0.94 MG/DL (ref 0.67–1.17)
DEPRECATED HCO3 PLAS-SCNC: 22 MMOL/L (ref 22–29)
DIASTOLIC BLOOD PRESSURE - MUSE: NORMAL MMHG
GFR SERPL CREATININE-BSD FRML MDRD: >90 ML/MIN/1.73M2
GLUCOSE SERPL-MCNC: 106 MG/DL (ref 70–99)
GLUCOSE SERPL-MCNC: 184 MG/DL (ref 70–99)
GRAM STAIN RESULT: NORMAL
GRAM STAIN RESULT: NORMAL
HGB BLD-MCNC: 14.7 G/DL (ref 13.3–17.7)
INTERPRETATION ECG - MUSE: NORMAL
P AXIS - MUSE: 8 DEGREES
POTASSIUM SERPL-SCNC: 3.9 MMOL/L (ref 3.4–5.3)
POTASSIUM SERPL-SCNC: 4.9 MMOL/L (ref 3.4–5.3)
PR INTERVAL - MUSE: 152 MS
QRS DURATION - MUSE: 84 MS
QT - MUSE: 440 MS
QTC - MUSE: 464 MS
R AXIS - MUSE: -26 DEGREES
SODIUM SERPL-SCNC: 143 MMOL/L (ref 136–145)
SPECIMEN EXPIRATION DATE: NORMAL
SYSTOLIC BLOOD PRESSURE - MUSE: NORMAL MMHG
T AXIS - MUSE: 118 DEGREES
VENTRICULAR RATE- MUSE: 67 BPM

## 2023-06-19 PROCEDURE — 258N000003 HC RX IP 258 OP 636: Performed by: NURSE ANESTHETIST, CERTIFIED REGISTERED

## 2023-06-19 PROCEDURE — 250N000011 HC RX IP 250 OP 636: Performed by: ANESTHESIOLOGY

## 2023-06-19 PROCEDURE — 84132 ASSAY OF SERUM POTASSIUM: CPT | Performed by: ANESTHESIOLOGY

## 2023-06-19 PROCEDURE — 99207 PR NO BILLABLE SERVICE THIS VISIT: CPT | Performed by: PHYSICIAN ASSISTANT

## 2023-06-19 PROCEDURE — 250N000012 HC RX MED GY IP 250 OP 636 PS 637: Performed by: ORTHOPAEDIC SURGERY

## 2023-06-19 PROCEDURE — 360N000078 HC SURGERY LEVEL 5, PER MIN: Performed by: ORTHOPAEDIC SURGERY

## 2023-06-19 PROCEDURE — 258N000001 HC RX 258: Performed by: ORTHOPAEDIC SURGERY

## 2023-06-19 PROCEDURE — 85018 HEMOGLOBIN: CPT | Performed by: ORTHOPAEDIC SURGERY

## 2023-06-19 PROCEDURE — 82947 ASSAY GLUCOSE BLOOD QUANT: CPT | Performed by: ORTHOPAEDIC SURGERY

## 2023-06-19 PROCEDURE — 999N000141 HC STATISTIC PRE-PROCEDURE NURSING ASSESSMENT: Performed by: ORTHOPAEDIC SURGERY

## 2023-06-19 PROCEDURE — 922N000001 HC NEURO MONITORING SERVICE, UP TO 7 HOURS (T1FEE): Performed by: ORTHOPAEDIC SURGERY

## 2023-06-19 PROCEDURE — C1776 JOINT DEVICE (IMPLANTABLE): HCPCS | Performed by: ORTHOPAEDIC SURGERY

## 2023-06-19 PROCEDURE — 120N000001 HC R&B MED SURG/OB

## 2023-06-19 PROCEDURE — 97116 GAIT TRAINING THERAPY: CPT | Mod: GP | Performed by: PHYSICAL THERAPIST

## 2023-06-19 PROCEDURE — 999N000063 XR PELVIS PORT 1/2 VIEWS

## 2023-06-19 PROCEDURE — 250N000011 HC RX IP 250 OP 636: Performed by: ORTHOPAEDIC SURGERY

## 2023-06-19 PROCEDURE — P9045 ALBUMIN (HUMAN), 5%, 250 ML: HCPCS | Performed by: NURSE ANESTHETIST, CERTIFIED REGISTERED

## 2023-06-19 PROCEDURE — 272N000001 HC OR GENERAL SUPPLY STERILE: Performed by: ORTHOPAEDIC SURGERY

## 2023-06-19 PROCEDURE — 250N000013 HC RX MED GY IP 250 OP 250 PS 637: Performed by: ORTHOPAEDIC SURGERY

## 2023-06-19 PROCEDURE — 250N000013 HC RX MED GY IP 250 OP 250 PS 637: Performed by: ANESTHESIOLOGY

## 2023-06-19 PROCEDURE — 0SP90JZ REMOVAL OF SYNTHETIC SUBSTITUTE FROM RIGHT HIP JOINT, OPEN APPROACH: ICD-10-PCS | Performed by: ORTHOPAEDIC SURGERY

## 2023-06-19 PROCEDURE — 0SR9029 REPLACEMENT OF RIGHT HIP JOINT WITH METAL ON POLYETHYLENE SYNTHETIC SUBSTITUTE, CEMENTED, OPEN APPROACH: ICD-10-PCS | Performed by: ORTHOPAEDIC SURGERY

## 2023-06-19 PROCEDURE — 258N000003 HC RX IP 258 OP 636: Performed by: ORTHOPAEDIC SURGERY

## 2023-06-19 PROCEDURE — 97530 THERAPEUTIC ACTIVITIES: CPT | Mod: GP | Performed by: PHYSICAL THERAPIST

## 2023-06-19 PROCEDURE — 250N000011 HC RX IP 250 OP 636: Performed by: NURSE ANESTHETIST, CERTIFIED REGISTERED

## 2023-06-19 PROCEDURE — 87176 TISSUE HOMOGENIZATION CULTR: CPT | Performed by: ORTHOPAEDIC SURGERY

## 2023-06-19 PROCEDURE — 36415 COLL VENOUS BLD VENIPUNCTURE: CPT | Performed by: ORTHOPAEDIC SURGERY

## 2023-06-19 PROCEDURE — 250N000009 HC RX 250: Performed by: NURSE ANESTHETIST, CERTIFIED REGISTERED

## 2023-06-19 PROCEDURE — 710N000009 HC RECOVERY PHASE 1, LEVEL 1, PER MIN: Performed by: ORTHOPAEDIC SURGERY

## 2023-06-19 PROCEDURE — 250N000009 HC RX 250: Performed by: ORTHOPAEDIC SURGERY

## 2023-06-19 PROCEDURE — 86850 RBC ANTIBODY SCREEN: CPT | Performed by: ORTHOPAEDIC SURGERY

## 2023-06-19 PROCEDURE — 87075 CULTR BACTERIA EXCEPT BLOOD: CPT | Performed by: ORTHOPAEDIC SURGERY

## 2023-06-19 PROCEDURE — 258N000003 HC RX IP 258 OP 636: Performed by: ANESTHESIOLOGY

## 2023-06-19 PROCEDURE — C1713 ANCHOR/SCREW BN/BN,TIS/BN: HCPCS | Performed by: ORTHOPAEDIC SURGERY

## 2023-06-19 PROCEDURE — 86901 BLOOD TYPING SEROLOGIC RH(D): CPT | Performed by: ORTHOPAEDIC SURGERY

## 2023-06-19 PROCEDURE — 370N000017 HC ANESTHESIA TECHNICAL FEE, PER MIN: Performed by: ORTHOPAEDIC SURGERY

## 2023-06-19 PROCEDURE — 97110 THERAPEUTIC EXERCISES: CPT | Mod: GP | Performed by: PHYSICAL THERAPIST

## 2023-06-19 PROCEDURE — 82947 ASSAY GLUCOSE BLOOD QUANT: CPT | Performed by: ANESTHESIOLOGY

## 2023-06-19 PROCEDURE — 85730 THROMBOPLASTIN TIME PARTIAL: CPT | Performed by: ORTHOPAEDIC SURGERY

## 2023-06-19 PROCEDURE — 97161 PT EVAL LOW COMPLEX 20 MIN: CPT | Mod: GP | Performed by: PHYSICAL THERAPIST

## 2023-06-19 PROCEDURE — 87205 SMEAR GRAM STAIN: CPT | Performed by: ORTHOPAEDIC SURGERY

## 2023-06-19 PROCEDURE — 250N000025 HC SEVOFLURANE, PER MIN: Performed by: ORTHOPAEDIC SURGERY

## 2023-06-19 DEVICE — M-SPEC METAL FEMORAL HEAD 12/14 TAPER DIAMETER 36MM -2: Type: IMPLANTABLE DEVICE | Site: HIP | Status: FUNCTIONAL

## 2023-06-19 DEVICE — BUCK FEMORAL CEMENT RESTRICTOR 25MM
Type: IMPLANTABLE DEVICE | Site: HIP | Status: FUNCTIONAL
Brand: BUCK

## 2023-06-19 DEVICE — CEMENTRALIZER STEM CENTRALIZER 10.5MM CEMENTED
Type: IMPLANTABLE DEVICE | Site: HIP | Status: FUNCTIONAL
Brand: CEMENTRALIZER

## 2023-06-19 DEVICE — BONE CEMENT SIMPLEX W/TOBRAMYCIN 6197-9-001: Type: IMPLANTABLE DEVICE | Site: HIP | Status: FUNCTIONAL

## 2023-06-19 DEVICE — PINNACLE HIP SOLUTIONS ALTRX POLYETHYLENE ACETABULAR LINER +4 10 DEGREE 36MM ID 54MM OD
Type: IMPLANTABLE DEVICE | Site: HIP | Status: FUNCTIONAL
Brand: PINNACLE ALTRX

## 2023-06-19 DEVICE — SIMPLEX® HV WITH GENTAMICIN IS A FAST-SETTING ACRYLIC RESIN WITH ADDITION OF GENTAMICIN SULFATE FOR USE IN BONE SURGERY. MIXING THE TWO SEPARATE STERILE COMPONENTS PRODUCES A DUCTILE BONE CEMENT WHICH, AFTER HARDENING, FIXES THE IMPLANT AND TRANSFERS STRESSES PRODUCED DURING MOVEMENT EVENLY TO THE BONE. SIMPLEX® HV WITH GENTAMICINN CEMENT POWDER ALSO CONTAINS INSOLUBLE ZIRCONIUM DIOXIDE AS AN X-RAY CONTRAST MEDIUM. SIMPLEX® HV WITH GENTAMICIN DOES NOT EMIT A SIGNAL AND DOES NOT POSE A SAFETY RISK IN A MAGNETIC RESONANCE ENVIRONMENT.
Type: IMPLANTABLE DEVICE | Site: HIP | Status: FUNCTIONAL
Brand: SIMPLEX HV WITH GENTAMICIN

## 2023-06-19 RX ORDER — NALOXONE HYDROCHLORIDE 0.4 MG/ML
0.4 INJECTION, SOLUTION INTRAMUSCULAR; INTRAVENOUS; SUBCUTANEOUS
Status: DISCONTINUED | OUTPATIENT
Start: 2023-06-19 | End: 2023-06-23 | Stop reason: HOSPADM

## 2023-06-19 RX ORDER — TRANEXAMIC ACID 10 MG/ML
1 INJECTION, SOLUTION INTRAVENOUS ONCE
Status: COMPLETED | OUTPATIENT
Start: 2023-06-19 | End: 2023-06-19

## 2023-06-19 RX ORDER — ISOSORBIDE MONONITRATE 30 MG/1
30 TABLET, EXTENDED RELEASE ORAL 2 TIMES DAILY
COMMUNITY
End: 2023-06-27

## 2023-06-19 RX ORDER — NALOXONE HYDROCHLORIDE 0.4 MG/ML
0.2 INJECTION, SOLUTION INTRAMUSCULAR; INTRAVENOUS; SUBCUTANEOUS
Status: DISCONTINUED | OUTPATIENT
Start: 2023-06-19 | End: 2023-06-23 | Stop reason: HOSPADM

## 2023-06-19 RX ORDER — ISOSORBIDE MONONITRATE 60 MG/1
60 TABLET, EXTENDED RELEASE ORAL DAILY
COMMUNITY
End: 2024-02-05

## 2023-06-19 RX ORDER — HYDRALAZINE HYDROCHLORIDE 20 MG/ML
10 INJECTION INTRAMUSCULAR; INTRAVENOUS EVERY 6 HOURS PRN
Status: DISCONTINUED | OUTPATIENT
Start: 2023-06-19 | End: 2023-06-23 | Stop reason: HOSPADM

## 2023-06-19 RX ORDER — LIDOCAINE 40 MG/G
CREAM TOPICAL
Status: DISCONTINUED | OUTPATIENT
Start: 2023-06-19 | End: 2023-06-23 | Stop reason: HOSPADM

## 2023-06-19 RX ORDER — HYDROMORPHONE HYDROCHLORIDE 1 MG/ML
0.5 INJECTION, SOLUTION INTRAMUSCULAR; INTRAVENOUS; SUBCUTANEOUS
Status: DISCONTINUED | OUTPATIENT
Start: 2023-06-19 | End: 2023-06-23 | Stop reason: HOSPADM

## 2023-06-19 RX ORDER — SODIUM CHLORIDE 9 MG/ML
INJECTION, SOLUTION INTRAVENOUS CONTINUOUS
Status: DISCONTINUED | OUTPATIENT
Start: 2023-06-19 | End: 2023-06-19 | Stop reason: HOSPADM

## 2023-06-19 RX ORDER — FLUTICASONE FUROATE AND VILANTEROL 100; 25 UG/1; UG/1
1 POWDER RESPIRATORY (INHALATION) DAILY
Status: DISCONTINUED | OUTPATIENT
Start: 2023-06-19 | End: 2023-06-23 | Stop reason: HOSPADM

## 2023-06-19 RX ORDER — CEFAZOLIN SODIUM/WATER 2 G/20 ML
2 SYRINGE (ML) INTRAVENOUS SEE ADMIN INSTRUCTIONS
Status: DISCONTINUED | OUTPATIENT
Start: 2023-06-19 | End: 2023-06-19 | Stop reason: HOSPADM

## 2023-06-19 RX ORDER — ISOSORBIDE MONONITRATE 30 MG/1
60 TABLET, EXTENDED RELEASE ORAL DAILY
Status: DISCONTINUED | OUTPATIENT
Start: 2023-06-20 | End: 2023-06-23 | Stop reason: HOSPADM

## 2023-06-19 RX ORDER — DEXAMETHASONE SODIUM PHOSPHATE 4 MG/ML
INJECTION, SOLUTION INTRA-ARTICULAR; INTRALESIONAL; INTRAMUSCULAR; INTRAVENOUS; SOFT TISSUE PRN
Status: DISCONTINUED | OUTPATIENT
Start: 2023-06-19 | End: 2023-06-19

## 2023-06-19 RX ORDER — ENTECAVIR 0.5 MG/1
0.5 TABLET, FILM COATED ORAL DAILY
Status: DISCONTINUED | OUTPATIENT
Start: 2023-06-20 | End: 2023-06-23 | Stop reason: HOSPADM

## 2023-06-19 RX ORDER — ACETAMINOPHEN 325 MG/1
975 TABLET ORAL EVERY 8 HOURS
Status: COMPLETED | OUTPATIENT
Start: 2023-06-19 | End: 2023-06-22

## 2023-06-19 RX ORDER — CLINDAMYCIN PHOSPHATE 900 MG/50ML
900 INJECTION, SOLUTION INTRAVENOUS
Status: COMPLETED | OUTPATIENT
Start: 2023-06-19 | End: 2023-06-19

## 2023-06-19 RX ORDER — ONDANSETRON 2 MG/ML
4 INJECTION INTRAMUSCULAR; INTRAVENOUS EVERY 6 HOURS PRN
Status: DISCONTINUED | OUTPATIENT
Start: 2023-06-19 | End: 2023-06-23 | Stop reason: HOSPADM

## 2023-06-19 RX ORDER — SODIUM CHLORIDE 9 MG/ML
INJECTION, SOLUTION INTRAVENOUS CONTINUOUS
Status: DISCONTINUED | OUTPATIENT
Start: 2023-06-19 | End: 2023-06-23 | Stop reason: HOSPADM

## 2023-06-19 RX ORDER — PROPOFOL 10 MG/ML
INJECTION, EMULSION INTRAVENOUS CONTINUOUS PRN
Status: DISCONTINUED | OUTPATIENT
Start: 2023-06-19 | End: 2023-06-19

## 2023-06-19 RX ORDER — LABETALOL HYDROCHLORIDE 5 MG/ML
10 INJECTION, SOLUTION INTRAVENOUS
Status: DISCONTINUED | OUTPATIENT
Start: 2023-06-19 | End: 2023-06-19 | Stop reason: HOSPADM

## 2023-06-19 RX ORDER — OXYCODONE HYDROCHLORIDE 5 MG/1
10 TABLET ORAL EVERY 4 HOURS PRN
Status: DISCONTINUED | OUTPATIENT
Start: 2023-06-19 | End: 2023-06-23 | Stop reason: HOSPADM

## 2023-06-19 RX ORDER — NITROGLYCERIN 0.4 MG/1
0.4 TABLET SUBLINGUAL EVERY 5 MIN PRN
Status: DISCONTINUED | OUTPATIENT
Start: 2023-06-19 | End: 2023-06-23 | Stop reason: HOSPADM

## 2023-06-19 RX ORDER — POLYETHYLENE GLYCOL 3350 17 G/17G
17 POWDER, FOR SOLUTION ORAL DAILY PRN
Status: DISCONTINUED | OUTPATIENT
Start: 2023-06-19 | End: 2023-06-23 | Stop reason: HOSPADM

## 2023-06-19 RX ORDER — PROPOFOL 10 MG/ML
INJECTION, EMULSION INTRAVENOUS PRN
Status: DISCONTINUED | OUTPATIENT
Start: 2023-06-19 | End: 2023-06-19

## 2023-06-19 RX ORDER — AMOXICILLIN 250 MG
1 CAPSULE ORAL 2 TIMES DAILY
Status: DISCONTINUED | OUTPATIENT
Start: 2023-06-19 | End: 2023-06-23 | Stop reason: HOSPADM

## 2023-06-19 RX ORDER — LIDOCAINE HYDROCHLORIDE 20 MG/ML
INJECTION, SOLUTION INFILTRATION; PERINEURAL PRN
Status: DISCONTINUED | OUTPATIENT
Start: 2023-06-19 | End: 2023-06-19

## 2023-06-19 RX ORDER — POLYETHYLENE GLYCOL 3350 17 G/17G
17 POWDER, FOR SOLUTION ORAL DAILY
Status: DISCONTINUED | OUTPATIENT
Start: 2023-06-20 | End: 2023-06-23 | Stop reason: HOSPADM

## 2023-06-19 RX ORDER — PREDNISONE 5 MG/1
5 TABLET ORAL DAILY
Status: DISCONTINUED | OUTPATIENT
Start: 2023-06-20 | End: 2023-06-23 | Stop reason: HOSPADM

## 2023-06-19 RX ORDER — OXYCODONE HYDROCHLORIDE 5 MG/1
5 TABLET ORAL EVERY 4 HOURS PRN
Status: DISCONTINUED | OUTPATIENT
Start: 2023-06-19 | End: 2023-06-23 | Stop reason: HOSPADM

## 2023-06-19 RX ORDER — TACROLIMUS 1 MG/G
OINTMENT TOPICAL 2 TIMES DAILY PRN
Status: DISCONTINUED | OUTPATIENT
Start: 2023-06-19 | End: 2023-06-23 | Stop reason: HOSPADM

## 2023-06-19 RX ORDER — HYDROMORPHONE HYDROCHLORIDE 1 MG/ML
INJECTION, SOLUTION INTRAMUSCULAR; INTRAVENOUS; SUBCUTANEOUS PRN
Status: DISCONTINUED | OUTPATIENT
Start: 2023-06-19 | End: 2023-06-19

## 2023-06-19 RX ORDER — MULTIPLE VITAMINS W/ MINERALS TAB 9MG-400MCG
1 TAB ORAL DAILY
Status: DISCONTINUED | OUTPATIENT
Start: 2023-06-19 | End: 2023-06-23 | Stop reason: HOSPADM

## 2023-06-19 RX ORDER — HYDROMORPHONE HCL IN WATER/PF 6 MG/30 ML
0.2 PATIENT CONTROLLED ANALGESIA SYRINGE INTRAVENOUS
Status: DISCONTINUED | OUTPATIENT
Start: 2023-06-19 | End: 2023-06-23 | Stop reason: HOSPADM

## 2023-06-19 RX ORDER — CEFAZOLIN SODIUM 1 G/3ML
1 INJECTION, POWDER, FOR SOLUTION INTRAMUSCULAR; INTRAVENOUS EVERY 8 HOURS
Status: DISCONTINUED | OUTPATIENT
Start: 2023-06-19 | End: 2023-06-20

## 2023-06-19 RX ORDER — ONDANSETRON 2 MG/ML
INJECTION INTRAMUSCULAR; INTRAVENOUS PRN
Status: DISCONTINUED | OUTPATIENT
Start: 2023-06-19 | End: 2023-06-19

## 2023-06-19 RX ORDER — ALBUTEROL SULFATE 90 UG/1
2 AEROSOL, METERED RESPIRATORY (INHALATION) EVERY 6 HOURS PRN
Status: DISCONTINUED | OUTPATIENT
Start: 2023-06-19 | End: 2023-06-23 | Stop reason: HOSPADM

## 2023-06-19 RX ORDER — FENTANYL CITRATE 50 UG/ML
INJECTION, SOLUTION INTRAMUSCULAR; INTRAVENOUS PRN
Status: DISCONTINUED | OUTPATIENT
Start: 2023-06-19 | End: 2023-06-19

## 2023-06-19 RX ORDER — ONDANSETRON 4 MG/1
4 TABLET, ORALLY DISINTEGRATING ORAL EVERY 30 MIN PRN
Status: DISCONTINUED | OUTPATIENT
Start: 2023-06-19 | End: 2023-06-19 | Stop reason: HOSPADM

## 2023-06-19 RX ORDER — ACETAMINOPHEN 500 MG
500-1000 TABLET ORAL 4 TIMES DAILY
Status: DISCONTINUED | OUTPATIENT
Start: 2023-06-19 | End: 2023-06-19 | Stop reason: DRUGHIGH

## 2023-06-19 RX ORDER — ONDANSETRON 4 MG/1
4 TABLET, ORALLY DISINTEGRATING ORAL EVERY 6 HOURS PRN
Status: DISCONTINUED | OUTPATIENT
Start: 2023-06-19 | End: 2023-06-23 | Stop reason: HOSPADM

## 2023-06-19 RX ORDER — HYDROMORPHONE HCL IN WATER/PF 6 MG/30 ML
0.2 PATIENT CONTROLLED ANALGESIA SYRINGE INTRAVENOUS EVERY 5 MIN PRN
Status: DISCONTINUED | OUTPATIENT
Start: 2023-06-19 | End: 2023-06-19 | Stop reason: HOSPADM

## 2023-06-19 RX ORDER — SODIUM CHLORIDE, SODIUM LACTATE, POTASSIUM CHLORIDE, CALCIUM CHLORIDE 600; 310; 30; 20 MG/100ML; MG/100ML; MG/100ML; MG/100ML
INJECTION, SOLUTION INTRAVENOUS CONTINUOUS
Status: DISCONTINUED | OUTPATIENT
Start: 2023-06-19 | End: 2023-06-19 | Stop reason: HOSPADM

## 2023-06-19 RX ORDER — HYDROMORPHONE HCL IN WATER/PF 6 MG/30 ML
0.4 PATIENT CONTROLLED ANALGESIA SYRINGE INTRAVENOUS EVERY 5 MIN PRN
Status: DISCONTINUED | OUTPATIENT
Start: 2023-06-19 | End: 2023-06-19 | Stop reason: HOSPADM

## 2023-06-19 RX ORDER — ISOSORBIDE MONONITRATE 30 MG/1
30 TABLET, EXTENDED RELEASE ORAL 2 TIMES DAILY
Start: 2023-06-19 | End: 2023-06-27

## 2023-06-19 RX ORDER — CEFAZOLIN SODIUM/WATER 2 G/20 ML
2 SYRINGE (ML) INTRAVENOUS
Status: COMPLETED | OUTPATIENT
Start: 2023-06-19 | End: 2023-06-19

## 2023-06-19 RX ORDER — FAMOTIDINE 20 MG/1
20 TABLET, FILM COATED ORAL 2 TIMES DAILY
Status: DISCONTINUED | OUTPATIENT
Start: 2023-06-19 | End: 2023-06-20

## 2023-06-19 RX ORDER — MYCOPHENOLATE MOFETIL 250 MG/1
750 CAPSULE ORAL 2 TIMES DAILY
Status: DISCONTINUED | OUTPATIENT
Start: 2023-06-19 | End: 2023-06-23 | Stop reason: HOSPADM

## 2023-06-19 RX ORDER — HYDROXYZINE HYDROCHLORIDE 25 MG/1
25 TABLET, FILM COATED ORAL EVERY 6 HOURS PRN
Status: DISCONTINUED | OUTPATIENT
Start: 2023-06-19 | End: 2023-06-23 | Stop reason: HOSPADM

## 2023-06-19 RX ORDER — PROCHLORPERAZINE MALEATE 10 MG
10 TABLET ORAL EVERY 6 HOURS PRN
Status: DISCONTINUED | OUTPATIENT
Start: 2023-06-19 | End: 2023-06-23 | Stop reason: HOSPADM

## 2023-06-19 RX ORDER — FENTANYL CITRATE 0.05 MG/ML
50 INJECTION, SOLUTION INTRAMUSCULAR; INTRAVENOUS EVERY 5 MIN PRN
Status: DISCONTINUED | OUTPATIENT
Start: 2023-06-19 | End: 2023-06-19 | Stop reason: HOSPADM

## 2023-06-19 RX ORDER — FUROSEMIDE 20 MG
20 TABLET ORAL DAILY PRN
Status: DISCONTINUED | OUTPATIENT
Start: 2023-06-19 | End: 2023-06-23 | Stop reason: HOSPADM

## 2023-06-19 RX ORDER — LATANOPROST 50 UG/ML
1 SOLUTION/ DROPS OPHTHALMIC AT BEDTIME
Status: DISCONTINUED | OUTPATIENT
Start: 2023-06-19 | End: 2023-06-23 | Stop reason: HOSPADM

## 2023-06-19 RX ORDER — MAGNESIUM HYDROXIDE 1200 MG/15ML
LIQUID ORAL PRN
Status: DISCONTINUED | OUTPATIENT
Start: 2023-06-19 | End: 2023-06-19 | Stop reason: HOSPADM

## 2023-06-19 RX ORDER — FENTANYL CITRATE 0.05 MG/ML
25 INJECTION, SOLUTION INTRAMUSCULAR; INTRAVENOUS EVERY 5 MIN PRN
Status: DISCONTINUED | OUTPATIENT
Start: 2023-06-19 | End: 2023-06-19 | Stop reason: HOSPADM

## 2023-06-19 RX ORDER — DROPERIDOL 2.5 MG/ML
0.62 INJECTION, SOLUTION INTRAMUSCULAR; INTRAVENOUS ONCE
Status: COMPLETED | OUTPATIENT
Start: 2023-06-19 | End: 2023-06-19

## 2023-06-19 RX ORDER — BISACODYL 10 MG
10 SUPPOSITORY, RECTAL RECTAL DAILY PRN
Status: DISCONTINUED | OUTPATIENT
Start: 2023-06-19 | End: 2023-06-23 | Stop reason: HOSPADM

## 2023-06-19 RX ORDER — ACETAMINOPHEN 325 MG/1
650 TABLET ORAL EVERY 4 HOURS PRN
Status: DISCONTINUED | OUTPATIENT
Start: 2023-06-22 | End: 2023-06-23 | Stop reason: HOSPADM

## 2023-06-19 RX ORDER — PANTOPRAZOLE SODIUM 40 MG/1
40 TABLET, DELAYED RELEASE ORAL DAILY
Status: DISCONTINUED | OUTPATIENT
Start: 2023-06-20 | End: 2023-06-23 | Stop reason: HOSPADM

## 2023-06-19 RX ORDER — VANCOMYCIN HYDROCHLORIDE 1 G/20ML
INJECTION, POWDER, LYOPHILIZED, FOR SOLUTION INTRAVENOUS PRN
Status: DISCONTINUED | OUTPATIENT
Start: 2023-06-19 | End: 2023-06-19 | Stop reason: HOSPADM

## 2023-06-19 RX ORDER — CLINDAMYCIN PHOSPHATE 900 MG/50ML
900 INJECTION, SOLUTION INTRAVENOUS SEE ADMIN INSTRUCTIONS
Status: DISCONTINUED | OUTPATIENT
Start: 2023-06-19 | End: 2023-06-19 | Stop reason: HOSPADM

## 2023-06-19 RX ORDER — ASPIRIN 81 MG/1
81 TABLET ORAL 2 TIMES DAILY
Status: DISCONTINUED | OUTPATIENT
Start: 2023-06-19 | End: 2023-06-23 | Stop reason: HOSPADM

## 2023-06-19 RX ORDER — ONDANSETRON 2 MG/ML
4 INJECTION INTRAMUSCULAR; INTRAVENOUS EVERY 30 MIN PRN
Status: DISCONTINUED | OUTPATIENT
Start: 2023-06-19 | End: 2023-06-19 | Stop reason: HOSPADM

## 2023-06-19 RX ORDER — ROSUVASTATIN CALCIUM 20 MG/1
20 TABLET, COATED ORAL DAILY
Status: DISCONTINUED | OUTPATIENT
Start: 2023-06-20 | End: 2023-06-23 | Stop reason: HOSPADM

## 2023-06-19 RX ADMIN — METOPROLOL SUCCINATE 12.5 MG: 25 TABLET, EXTENDED RELEASE ORAL at 06:49

## 2023-06-19 RX ADMIN — PHENYLEPHRINE HYDROCHLORIDE 100 MCG: 10 INJECTION INTRAVENOUS at 07:44

## 2023-06-19 RX ADMIN — PHENYLEPHRINE HYDROCHLORIDE 150 MCG: 10 INJECTION INTRAVENOUS at 10:17

## 2023-06-19 RX ADMIN — FAMOTIDINE 20 MG: 20 TABLET ORAL at 20:14

## 2023-06-19 RX ADMIN — DROPERIDOL 0.62 MG: 2.5 INJECTION, SOLUTION INTRAMUSCULAR; INTRAVENOUS at 12:51

## 2023-06-19 RX ADMIN — PHENYLEPHRINE HYDROCHLORIDE 100 MCG: 10 INJECTION INTRAVENOUS at 11:14

## 2023-06-19 RX ADMIN — HYDROXYZINE HYDROCHLORIDE 25 MG: 25 TABLET, FILM COATED ORAL at 21:25

## 2023-06-19 RX ADMIN — FENTANYL CITRATE 25 MCG: 50 INJECTION, SOLUTION INTRAMUSCULAR; INTRAVENOUS at 10:55

## 2023-06-19 RX ADMIN — PHENYLEPHRINE HYDROCHLORIDE 100 MCG: 10 INJECTION INTRAVENOUS at 08:14

## 2023-06-19 RX ADMIN — ONDANSETRON 4 MG: 2 INJECTION INTRAMUSCULAR; INTRAVENOUS at 11:02

## 2023-06-19 RX ADMIN — PHENYLEPHRINE HYDROCHLORIDE 100 MCG: 10 INJECTION INTRAVENOUS at 08:36

## 2023-06-19 RX ADMIN — PHENYLEPHRINE HYDROCHLORIDE 150 MCG: 10 INJECTION INTRAVENOUS at 08:06

## 2023-06-19 RX ADMIN — HYDROMORPHONE HYDROCHLORIDE 0.3 MG: 1 INJECTION, SOLUTION INTRAMUSCULAR; INTRAVENOUS; SUBCUTANEOUS at 11:08

## 2023-06-19 RX ADMIN — LIDOCAINE HYDROCHLORIDE 80 MG: 20 INJECTION, SOLUTION INFILTRATION; PERINEURAL at 07:33

## 2023-06-19 RX ADMIN — OXYCODONE HYDROCHLORIDE 5 MG: 5 TABLET ORAL at 18:01

## 2023-06-19 RX ADMIN — ROCURONIUM BROMIDE 40 MG: 50 INJECTION, SOLUTION INTRAVENOUS at 07:33

## 2023-06-19 RX ADMIN — PROPOFOL 140 MG: 10 INJECTION, EMULSION INTRAVENOUS at 07:33

## 2023-06-19 RX ADMIN — ROCURONIUM BROMIDE 5 MG: 50 INJECTION, SOLUTION INTRAVENOUS at 10:15

## 2023-06-19 RX ADMIN — TRANEXAMIC ACID 1 G: 10 INJECTION, SOLUTION INTRAVENOUS at 10:59

## 2023-06-19 RX ADMIN — ACETAMINOPHEN 975 MG: 325 TABLET ORAL at 21:25

## 2023-06-19 RX ADMIN — FENTANYL CITRATE 25 MCG: 50 INJECTION, SOLUTION INTRAMUSCULAR; INTRAVENOUS at 09:52

## 2023-06-19 RX ADMIN — PHENYLEPHRINE HYDROCHLORIDE 0.4 MCG/KG/MIN: 10 INJECTION INTRAVENOUS at 08:16

## 2023-06-19 RX ADMIN — PHENYLEPHRINE HYDROCHLORIDE 150 MCG: 10 INJECTION INTRAVENOUS at 11:32

## 2023-06-19 RX ADMIN — PHENYLEPHRINE HYDROCHLORIDE 100 MCG: 10 INJECTION INTRAVENOUS at 07:48

## 2023-06-19 RX ADMIN — PHENYLEPHRINE HYDROCHLORIDE 150 MCG: 10 INJECTION INTRAVENOUS at 09:20

## 2023-06-19 RX ADMIN — PHENYLEPHRINE HYDROCHLORIDE 100 MCG: 10 INJECTION INTRAVENOUS at 10:59

## 2023-06-19 RX ADMIN — PHENYLEPHRINE HYDROCHLORIDE 100 MCG: 10 INJECTION INTRAVENOUS at 07:52

## 2023-06-19 RX ADMIN — SODIUM CHLORIDE: 9 INJECTION, SOLUTION INTRAVENOUS at 09:22

## 2023-06-19 RX ADMIN — PHENYLEPHRINE HYDROCHLORIDE 100 MCG: 10 INJECTION INTRAVENOUS at 11:02

## 2023-06-19 RX ADMIN — PHENYLEPHRINE HYDROCHLORIDE 100 MCG: 10 INJECTION INTRAVENOUS at 10:03

## 2023-06-19 RX ADMIN — FENTANYL CITRATE 25 MCG: 50 INJECTION, SOLUTION INTRAMUSCULAR; INTRAVENOUS at 07:33

## 2023-06-19 RX ADMIN — ROCURONIUM BROMIDE 10 MG: 50 INJECTION, SOLUTION INTRAVENOUS at 08:30

## 2023-06-19 RX ADMIN — PHENYLEPHRINE HYDROCHLORIDE 100 MCG: 10 INJECTION INTRAVENOUS at 11:21

## 2023-06-19 RX ADMIN — Medication 2 G: at 11:28

## 2023-06-19 RX ADMIN — TRANEXAMIC ACID 1 G: 10 INJECTION, SOLUTION INTRAVENOUS at 07:55

## 2023-06-19 RX ADMIN — ROCURONIUM BROMIDE 10 MG: 50 INJECTION, SOLUTION INTRAVENOUS at 09:15

## 2023-06-19 RX ADMIN — HYDROMORPHONE HYDROCHLORIDE 0.2 MG: 1 INJECTION, SOLUTION INTRAMUSCULAR; INTRAVENOUS; SUBCUTANEOUS at 11:22

## 2023-06-19 RX ADMIN — PHENYLEPHRINE HYDROCHLORIDE 200 MCG: 10 INJECTION INTRAVENOUS at 11:38

## 2023-06-19 RX ADMIN — ACETAMINOPHEN 975 MG: 325 TABLET ORAL at 15:32

## 2023-06-19 RX ADMIN — PROPOFOL 20 MCG/KG/MIN: 10 INJECTION, EMULSION INTRAVENOUS at 07:54

## 2023-06-19 RX ADMIN — PHENYLEPHRINE HYDROCHLORIDE 100 MCG: 10 INJECTION INTRAVENOUS at 07:58

## 2023-06-19 RX ADMIN — FENTANYL CITRATE 25 MCG: 50 INJECTION, SOLUTION INTRAMUSCULAR; INTRAVENOUS at 09:06

## 2023-06-19 RX ADMIN — PHENYLEPHRINE HYDROCHLORIDE 100 MCG: 10 INJECTION INTRAVENOUS at 08:17

## 2023-06-19 RX ADMIN — Medication 2 G: at 07:37

## 2023-06-19 RX ADMIN — PHENYLEPHRINE HYDROCHLORIDE 0.7 MCG/KG/MIN: 10 INJECTION INTRAVENOUS at 09:22

## 2023-06-19 RX ADMIN — MIDAZOLAM 1 MG: 1 INJECTION INTRAMUSCULAR; INTRAVENOUS at 07:26

## 2023-06-19 RX ADMIN — DEXAMETHASONE SODIUM PHOSPHATE 4 MG: 4 INJECTION, SOLUTION INTRA-ARTICULAR; INTRALESIONAL; INTRAMUSCULAR; INTRAVENOUS; SOFT TISSUE at 07:45

## 2023-06-19 RX ADMIN — PHENYLEPHRINE HYDROCHLORIDE 100 MCG: 10 INJECTION INTRAVENOUS at 08:16

## 2023-06-19 RX ADMIN — ALBUMIN HUMAN: 0.05 INJECTION, SOLUTION INTRAVENOUS at 09:40

## 2023-06-19 RX ADMIN — CLINDAMYCIN PHOSPHATE 900 MG: 900 INJECTION, SOLUTION INTRAVENOUS at 06:53

## 2023-06-19 RX ADMIN — CEFAZOLIN 1 G: 1 INJECTION, POWDER, FOR SOLUTION INTRAMUSCULAR; INTRAVENOUS at 20:14

## 2023-06-19 RX ADMIN — SODIUM CHLORIDE: 9 INJECTION, SOLUTION INTRAVENOUS at 14:58

## 2023-06-19 RX ADMIN — PHENYLEPHRINE HYDROCHLORIDE 0.6 MCG/KG/MIN: 10 INJECTION INTRAVENOUS at 10:35

## 2023-06-19 RX ADMIN — FLUTICASONE FUROATE AND VILANTEROL TRIFENATATE 1 PUFF: 100; 25 POWDER RESPIRATORY (INHALATION) at 20:13

## 2023-06-19 RX ADMIN — PHENYLEPHRINE HYDROCHLORIDE 100 MCG: 10 INJECTION INTRAVENOUS at 07:41

## 2023-06-19 RX ADMIN — PHENYLEPHRINE HYDROCHLORIDE 100 MCG: 10 INJECTION INTRAVENOUS at 07:57

## 2023-06-19 RX ADMIN — SENNOSIDES AND DOCUSATE SODIUM 1 TABLET: 50; 8.6 TABLET ORAL at 20:14

## 2023-06-19 RX ADMIN — SUGAMMADEX 100 MG: 100 INJECTION, SOLUTION INTRAVENOUS at 12:06

## 2023-06-19 RX ADMIN — SODIUM CHLORIDE: 9 INJECTION, SOLUTION INTRAVENOUS at 06:53

## 2023-06-19 RX ADMIN — ASPIRIN 81 MG: 81 TABLET, COATED ORAL at 20:14

## 2023-06-19 RX ADMIN — ONDANSETRON 4 MG: 2 INJECTION INTRAMUSCULAR; INTRAVENOUS at 12:30

## 2023-06-19 RX ADMIN — PHENYLEPHRINE HYDROCHLORIDE 100 MCG: 10 INJECTION INTRAVENOUS at 10:49

## 2023-06-19 RX ADMIN — PHENYLEPHRINE HYDROCHLORIDE 100 MCG: 10 INJECTION INTRAVENOUS at 09:29

## 2023-06-19 RX ADMIN — MULTIPLE VITAMINS W/ MINERALS TAB 1 TABLET: TAB at 15:32

## 2023-06-19 RX ADMIN — SUGAMMADEX 200 MG: 100 INJECTION, SOLUTION INTRAVENOUS at 11:46

## 2023-06-19 RX ADMIN — MYCOPHENOLATE MOFETIL 750 MG: 250 CAPSULE ORAL at 20:14

## 2023-06-19 RX ADMIN — ALBUMIN HUMAN: 0.05 INJECTION, SOLUTION INTRAVENOUS at 08:56

## 2023-06-19 RX ADMIN — PHENYLEPHRINE HYDROCHLORIDE 150 MCG: 10 INJECTION INTRAVENOUS at 07:50

## 2023-06-19 ASSESSMENT — ACTIVITIES OF DAILY LIVING (ADL)
ADLS_ACUITY_SCORE: 22
ADLS_ACUITY_SCORE: 37

## 2023-06-19 ASSESSMENT — COPD QUESTIONNAIRES: COPD: 1

## 2023-06-19 NOTE — BRIEF OP NOTE
[facility]    Brief Operative Note    Pre-operative diagnosis: Failed total hip arthroplasty (H) [T84.018A, Z96.649]  Post-operative diagnosis same    Procedure: Procedure(s):  RIGHT HIP REVISION  Surgeon: Surgeon(s) and Role:     * Juan Mcdonough MD - Primary    Asst   osmar De La Cruz  opa-c  Anesthesia: General   Estimated Blood Loss: 400 ml    Drains: None  Specimens:   ID Type Source Tests Collected by Time Destination   A : Synoviun Tissue Hip, Right ANAEROBIC BACTERIAL CULTURE ROUTINE, GRAM STAIN, AEROBIC BACTERIAL CULTURE ROUTINE Juan Mcdonough MD 6/19/2023  8:25 AM      Findings:   None.  Complications: None.  Implants:   Implant Name Type Inv. Item Serial No.  Lot No. LRB No. Used Action   Total Hip Components      Right 2 Explanted   IMP LINER HIP DEPUY PINNACLE ALTRX [id number] +[id number] [id number] - [id number] Total Joint Component/Insert IMP LINER HIP DEPUY PINNACLE ALTRX [id number] +4 [id number]  [employer]- [id number] Right 1 Implanted   IMP BONE CEMENT SIMPLEX W/GENTAMICIN 6195-1-001 - [id number] Cement, Bone IMP BONE CEMENT SIMPLEX W/GENTAMICIN 6195-1-001  [employer] [id number] Right 1 Implanted   BONE CEMENT RESTRICTOR GUY FEMORAL 25MM [id number] - [id number] Cement, Bone BONE CEMENT RESTRICTOR GUY FEMORAL 25MM [id number]  [employer]-R [id number] Right 1 Implanted   IMP STEM CENTRALIZER DEPUY 10.5MM 1376- - [id number] Total Joint Component/Insert IMP STEM CENTRALIZER DEPUY 10.5MM 1376-  [employer]- [id number] Right 1 Implanted   IMP FEMORAL STEM SIZE 3 200mm     [id number] Right 1 Implanted   BONE CEMENT SIMPLEX W/TOBRAMYCIN 6197-9-001 - [id number] Cement, Bone BONE CEMENT SIMPLEX W/TOBRAMYCIN 6197-9-001  [employer] [id number] Right 1 Implanted   BONE CEMENT SIMPLEX W/TOBRAMYCIN 6197-9-001 - [id number] Cement, Bone BONE CEMENT SIMPLEX W/TOBRAMYCIN 6197-9-001  [employer] [id number] Right 1 Implanted   IMP HEAD FEMORAL  DEPUY 36MM -2 1365- - [id number] Total Joint Component/Insert IMP HEAD FEMORAL DEPUY 36MM -2 6257-  [employer] [facility]- [id number] Right 1 Implanted

## 2023-06-19 NOTE — PROGRESS NOTES
06/19/23 1500   Appointment Info   Signing Clinician's Name / Credentials (PT) Ida Hollins DPT   Living Environment   People in Home other (see comments)  (Roommate)   Current Living Arrangements house   Home Accessibility stairs to enter home;stairs within home   Number of Stairs, Main Entrance 4   Stair Railings, Main Entrance railing on left side (ascending)   Number of Stairs, Within Home, Primary   (14)   Stair Railings, Within Home, Primary railing on left side (ascending)   Transportation Anticipated car, drives self   Living Environment Comments Pt's roommate is not available to provide assist.   Self-Care   Usual Activity Tolerance good   Current Activity Tolerance fair   Equipment Currently Used at Home cane, straight   Fall history within last six months no   Activity/Exercise/Self-Care Comment Pt owns SEC and FWW.   General Information   Onset of Illness/Injury or Date of Surgery 06/19/23   Referring Physician Juan Mcdonough MD   Patient/Family Therapy Goals Statement (PT) TCU prior to returning home.   Pertinent History of Current Problem (include personal factors and/or comorbidities that impact the POC) 64 y/o male POD # 0 R MITZI revision. PMH significant for CAD status post CABG in 2020, status post ESRD with bilateral kidney transplant, hypertension and history of splenectomy in 1978   Existing Precautions/Restrictions fall;no hip IR;no hip ER;no hip ADD past midline;no hip hyperextension;90 degree hip flexion   Weight-Bearing Status - RLE weight-bearing as tolerated   General Observations Pt in supine upon arrival of therapist.   Cognition   Affect/Mental Status (Cognition) WNL   Orientation Status (Cognition) oriented x 3   Follows Commands (Cognition) WNL   Pain Assessment   Patient Currently in Pain   (R hip pain at rest: 6/10)   Integumentary/Edema   Integumentary/Edema Comments R hip incision covered with wound vac.   Posture    Posture Comments Noted forward head and shoulder  posture sitting EOB and standing at FWW.   Range of Motion (ROM)   ROM Comment Limited R hip ROM d/t pain and hip precautions, otherwise B LEs WFL.   Strength (Manual Muscle Testing)   Strength Comments Not formally assessed, pt demonstrates at least 3/5 grossly in B LEs with functional mobility.   Bed Mobility   Comment, (Bed Mobility) Supine-sit with Mychal.   Transfers   Comment, (Transfers) Sit <> stand with FWW and Mychal.   Gait/Stairs (Locomotion)   Distance in Feet (Gait) 15'   Comment, (Gait/Stairs) Pt amb 5' with FWW and CGA.   Balance   Balance Comments Noted good seated and standing balance at FWW.   Sensory Examination   Sensory Perception Comments Pt denies numbness/tingling in B LEs.   Clinical Impression   Criteria for Skilled Therapeutic Intervention Yes, treatment indicated   PT Diagnosis (PT) Difficulty with functional mobility.   Influenced by the following impairments Pain, Impaired R hip ROM, Decreased strength, Decreased activity tolerance   Functional limitations due to impairments Limited functional mobility requiring AD and assist.   Clinical Presentation (PT Evaluation Complexity) Stable/Uncomplicated   Clinical Presentation Rationale Based on PMH, current presentation, and social support.   Clinical Decision Making (Complexity) low complexity   Planned Therapy Interventions (PT) balance training;bed mobility training;gait training;ROM (range of motion);stair training;strengthening;transfer training   Risk & Benefits of therapy have been explained patient   PT Total Evaluation Time   PT Chris, Low Complexity Minutes (90417) 10   Plan of Care Review   Plan of Care Reviewed With patient   Physical Therapy Goals   PT Frequency Daily   PT Predicted Duration/Target Date for Goal Attainment 06/23/23   PT Goals Bed Mobility;Transfers;Gait;Stairs   PT: Bed Mobility Supervision/stand-by assist;Supine to/from sit;Within precautions   PT: Transfers Supervision/stand-by assist;Sit to/from stand;Assistive  device;Within precautions   PT: Gait Supervision/stand-by assist;Rolling walker;100 feet;Within precautions   PT: Stairs Minimal assist;4 stairs;Rail on left   Interventions   Interventions Quick Adds Gait Training;Therapeutic Activity;Therapeutic Procedure   Therapeutic Procedure/Exercise   Ther. Procedure: strength, endurance, ROM, flexibillity Minutes (96598) 12   Symptoms Noted During/After Treatment fatigue;increased pain   Treatment Detail/Skilled Intervention PT: Pt performed supine TKA exercises x 10 repetitions with cues for technique: ankle pumps, glut set, quad set, heel slides and SAQ.   Therapeutic Activity   Therapeutic Activities: dynamic activities to improve functional performance Minutes (79646) 18   Symptoms Noted During/After Treatment Dizziness;Fatigue;Increased pain   Treatment Detail/Skilled Intervention PT: Educated pt on WBAT and hip precautions. Provided pt with hip precaution handout. Pt required cues for sequencing in order to maintain hip precautions with supine-sit, Mychal. Sit <> stand from EOB with FWW and Mychal, cues provided for hand placement and upright posture. Pt sitting up in chair at end of session, chair alarm on and needs within reach. Pt reported improved in dizziness upon sitting in the chair.   Gait Training   Gait Training Minutes (64194) 8   Symptoms Noted During/After Treatment (Gait Training) dizziness;fatigue;increased pain   Treatment Detail/Skilled Intervention Pt amb an additional 15' with FWW and CGA, frequent cues provided for sequencing. Pt reported increased weakness with amb requesting to sit. Upon sitting in chair pt reported increased dizziness. Pt reported improvement in dizziness with elevation of B LEs. RN bedside. Vitals stable.   PT Discharge Planning   PT Plan Progress transfers, gait with FWW, LE ex/ROM, monitor dizziness   PT Rationale for DC Rec Per discussion with pt, plan per ortho of TCU at discharge. Anticipate pt will require CGA-Mychal with  mobility upon discharge with use of FWW   PT Brief overview of current status Monitor vitals-pt with increased dizziness with ambulation, assist of 1 with FWW   Total Session Time   Timed Code Treatment Minutes 38   Total Session Time (sum of timed and untimed services) 48

## 2023-06-19 NOTE — CONSULTS
"[facility]  Consult Note - Hospitalist Service      Date of Admission:  6/19/2023  Consult Requested by: Orthopedics  Reason for Consult: Medical comanagement and med rec    Assessment & Plan   Jerad Ross is a 63 year old male with PMH significant for CAD status post CABG in 2020, status post ESRD with bilateral kidney transplant, hypertension and history of splenectomy in 1978 who was admitted to [facility] on 6/19/2023 by the Orthopedic service for right hip revision with Dr. Mcdonough. No immediate postoperative complications,  mL. Preoperative H&P reviewed.     Vital signs:  Temp: 97.8  F (36.6  C) Temp src: Temporal BP: 111/68 Pulse: 80   Resp: 15 SpO2: 95 % O2 Device: Nasal cannula Oxygen Delivery: 4 LPM Height: 167.6 cm (5' 6\") Weight: 80.7 kg (178 lb)  Estimated body mass index is 28.73 kg/m  as calculated from the following:    Height as of this encounter: 1.676 m (5' 6\").    Weight as of this encounter: 80.7 kg (178 lb).    Diet: per primary team  DVT Prophylaxis: Defer to primary service  Blackwood Catheter: PRESENT, indication: Strict 1-2 Hour I&O  Code Status:   full based on previously charted    Plan:  - Routine post-operative cares, IVF, DVT prophylaxis, and pain management to orthopedic service.  - Will check some basic lab work in the AM to assess for acute blood loss anemia given surgery.   - PT and OT to assess per Ortho.   - Bowel regimen in place while on pain regimen.  -  No changes to PTA medication regimen at discharge unless issues arise throughout stay.   - Aggressive pulmonary toilet, frequent IS use 10x/hour while awake.  - Appropriate PTA medications resumed.  - PRN IV antihypertensives available for SBP >180.   - Changed start date of PTA Metoprolol 12.5 mg daily to be given tomorrow with HOLD parameters as given pre-operatively already.   - Discharge medication reconciliation completed.  - Given stability of chronic conditions, " Hospitalist Service will defer formal consultation. Hospitalist will sign off.     *Please reconsult if medical issues arise that require Hospitalist assistance    No charge note.    ROBBY Arredondo PA-C  Hospitalist [facility]  [facility]  Securely message with the [employer] Web Console (learn more here)  Text page via [facility] Paging/Directory

## 2023-06-19 NOTE — OR NURSING
LIZZ Vasquez at bedside. OK to transfer to the floor. One belonging bag returned to pt. Sister Updated by phone.

## 2023-06-19 NOTE — ANESTHESIA CARE TRANSFER NOTE
Patient: Jerad Ross    Procedure: Procedure(s):  RIGHT HIP REVISION       Diagnosis: Failed total hip arthroplasty (H) [T84.018A, Z96.649]  Diagnosis Additional Information: No value filed.    Anesthesia Type:   General     Note:    Oropharynx: oropharynx clear of all foreign objects and spontaneously breathing  Level of Consciousness: awake  Oxygen Supplementation: face mask  Level of Supplemental Oxygen (L/min / FiO2): 6  Independent Airway: airway patency satisfactory and stable  Dentition: dentition unchanged  Vital Signs Stable: post-procedure vital signs reviewed and stable  Report to RN Given: handoff report given  Patient transferred to: PACU    Handoff Report: Identifed the Patient, Identified the Reponsible Provider, Reviewed the pertinent medical history, Discussed the surgical course, Reviewed Intra-OP anesthesia mangement and issues during anesthesia, Set expectations for post-procedure period and Allowed opportunity for questions and acknowledgement of understanding      Vitals:  Vitals Value Taken Time   /78 06/19/23 1210   Temp     Pulse 94 06/19/23 1213   Resp 10 06/19/23 1213   SpO2 96 % 06/19/23 1213   Vitals shown include unvalidated device data.    Electronically Signed By: RONALDO Cano CRNA  June 19, 2023  12:15 PM

## 2023-06-19 NOTE — ANESTHESIA PREPROCEDURE EVALUATION
Anesthesia Pre-Procedure Evaluation    Patient: Jerad Ross   MRN: [id number] : 1960        Procedure : Procedure(s):  RIGHT HIP REVISION          Past Medical History:   Diagnosis Date     Acute midline low back pain without sciatica      AION (acute ischemic optic neuropathy)      Anemia in chronic renal disease      Arthritis      Avascular necrosis of bones of both hips (H)     s/p bilateral hip replacements     Avascular necrosis of bones of both hips (H)     s/p bilateral hip replacements     Basal cell carcinoma      Chronic hepatitis B (H)      Clostridium difficile colitis      COPD (chronic obstructive pulmonary disease) (H)      Coronary artery disease involving native coronary artery of native heart without angina pectoris 2014    Coronary angiogram 14: Severe distal 3-vessel disease involving small vessels, not amenable to PCI or CABG.     CRP elevated      Depression      Depressive disorder      Diverticulosis      Dyslipidemia      Elevated C-reactive protein (CRP)      FSGS (focal segmental glomerulosclerosis)      FSGS (focal segmental glomerulosclerosis)      Gastric ulcer with hemorrhage 2012     Gastric ulcer with hemorrhage 2012     GERD (gastroesophageal reflux disease)      Glaucoma     OHTN     Glaucoma      Hemorrhoids      Hemorrhoids      History of blood transfusion      HTN (hypertension)      Hyperlipidemia      Hypertension secondary to other renal disorders      Hypogonadism in male      Immunosuppressed status (H)      Kidney replaced by transplant     focal glomerulosclerosis      Kidney transplanted     focal glomerulosclerosis      NSTEMI (non-ST elevated myocardial infarction) (H) 2014     NSTEMI (non-ST elevated myocardial infarction) (H) 2014     JULIANE (obstructive sleep apnea)     Doesn't use CPAP     Paracentral scotoma     LE     Secondary renal hyperparathyroidism (H)      Secondary renal hyperparathyroidism (H)      Septic  bursitis      Squamous cell carcinoma      Uncomplicated asthma       Past Surgical History:   Procedure Laterality Date     APPENDECTOMY       BIOPSY       Cardiac Bypass surgery  06/08/2020    [place]      CATARACT EXTRACTION EXTRACAPSULAR W/ INTRAOCULAR LENS IMPLANTATION Bilateral 4-20-10, 6-1-10     CATARACT IOL, RT/LT  04/19/2000    RE     CATARACT IOL, RT/LT  06/01/2000    LE     COLECTOMY PARTIAL  01/01/1983     10 cm, diverticulitis      COLECTOMY SUBTOTAL  1983    10 cm, diverticulitis     COLONOSCOPY  02/13/2012    Procedure:COLONOSCOPY; Surgeon:SLOAN GALLARDO; Location: GI     COLONOSCOPY N/A 01/22/2020    Procedure: Colonoscopy, With Polypectomy And Biopsy;  Surgeon: Aston Kiran MD;  Location:  GI     CV CORONARY ANGIOGRAM N/A 06/02/2020    Procedure: Coronary Angiogram;  Surgeon: Alona Florentino MD;  Location: [facility] Cath Lab;  Service: Cardiology     CV CORONARY ANGIOGRAM N/A 09/20/2021    Procedure: Coronary Angiogram;  Surgeon: Adam Agudelo MD;  Location: [facility] CV     CV LEFT HEART CATHETERIZATION WITHOUT LEFT VENTRICULOGRAM Left 06/02/2020    Procedure: Left Heart Catheterization Without Left Ventriculogram;  Surgeon: Alona Florentino MD;  Location: [facility] Cath Lab;  Service: Cardiology     CV SUPRAVALVULAR AORTOGRAM N/A 09/20/2021    Procedure: Supravalvular Aortagram;  Surgeon: Adam Agudelo MD;  Location: [facility] CV     ESOPHAGOSCOPY, GASTROSCOPY, DUODENOSCOPY (EGD), COMBINED  02/13/2012    Procedure:COMBINED ESOPHAGOSCOPY, GASTROSCOPY, DUODENOSCOPY (EGD); Surgeon:SLOAN GALLARDO; Location: GI     ESOPHAGOSCOPY, GASTROSCOPY, DUODENOSCOPY (EGD), COMBINED  02/13/2012     EXTRACAPSULAR CATARACT EXTRATION WITH INTRAOCULAR LENS IMPLANT  4-20-10, 6-1-10    Rt, Lt     HERNIA REPAIR  1995    Lt inguinal     HERNIA REPAIR  01/01/1995    Lt inguinal     HIP SURGERY      1981, bilat MITZI, revised 2001, 2005     HIP  SURGERY  1981    bilat MITZI, revised ,      IR FINE NEEDLE ASPIRATION W ULTRASOUND  04/10/2023     KIDNEY SURGERY   and     transplant     KNEE SURGERY  ,     bilat TKA     MOHS MICROGRAPHIC PROCEDURE       MOHS MICROGRAPHIC PROCEDURE       ORTHOPEDIC SURGERY       OTHER SURGICAL HISTORY      kidney [id number], 1993     PHACOEMULSIFICATION CLEAR CORNEA WITH STANDARD INTRAOCULAR LENS IMPLANT Right 2000     PHACOEMULSIFICATION CLEAR CORNEA WITH STANDARD INTRAOCULAR LENS IMPLANT Left 2000     SPLENECTOMY      leukopenia, auxiliary spleen     TONSILLECTOMY       TRANSPLANT       VASCULAR SURGERY        Allergies   Allergen Reactions     Penicillins Shortness Of Breath and Hives     Keflex [Cephalexin Hcl] Other (See Comments)     Pt could not recall reaction     Tetracycline Other (See Comments)     Patient could not recall reaction     Sulfa Antibiotics Rash      Social History     Tobacco Use     Smoking status: Former     Packs/day: 1.00     Years: 9.00     Pack years: 9.00     Types: Cigarettes     Start date: 1980     Quit date: 1988     Years since quittin.4     Smokeless tobacco: Former   Vaping Use     Vaping status: Never Used   Substance Use Topics     Alcohol use: Not Currently     Comment: rarely 1 drink per month      Wt Readings from Last 1 Encounters:   23 80.7 kg (178 lb)        Anesthesia Evaluation            ROS/MED HX  ENT/Pulmonary: Comment: AION (acute ischemic optic neuropathy)    (+) sleep apnea, doesn't use CPAP, Intermittent, asthma Treatment: Inhaler prn,  COPD,     Neurologic:       Cardiovascular:     (+) Dyslipidemia hypertension--CAD -past MI CABG-date: . -    METS/Exercise Tolerance:     Hematologic:       Musculoskeletal:       GI/Hepatic: Comment: Diverticulosis    (+) GERD, hepatitis type B,     Renal/Genitourinary:     (+) renal disease, Pt has history of transplant, date: ,     Endo:        Psychiatric/Substance Use:     (+) psychiatric history depression     Infectious Disease:       Malignancy:       Other:            Physical Exam    Airway        Mallampati: I   TM distance: > 3 FB   Neck ROM: full   Mouth opening: > 3 cm    Respiratory Devices and Support         Dental       (+) Minor Abnormalities - some fillings, tiny chips      Cardiovascular   cardiovascular exam normal          Pulmonary   pulmonary exam normal                OUTSIDE LABS:  CBC:   Lab Results   Component Value Date    WBC 8.1 06/12/2023    WBC 6.3 04/17/2023    HGB 14.7 06/19/2023    HGB 14.6 06/12/2023    HCT 45.1 06/12/2023    HCT 46.9 04/17/2023     06/12/2023     04/17/2023     BMP:   Lab Results   Component Value Date     06/12/2023     04/17/2023    POTASSIUM 3.9 06/12/2023    POTASSIUM 4.0 04/17/2023    CHLORIDE 105 06/12/2023    CHLORIDE 100 04/17/2023    CO2 27 06/12/2023    CO2 24 04/17/2023    BUN 9.4 06/12/2023    BUN 11.3 04/17/2023    CR 0.69 06/12/2023    CR 0.81 04/17/2023    GLC 93 06/12/2023    GLC 74 04/17/2023     COAGS:   Lab Results   Component Value Date    PTT 27 06/19/2023    INR 1.04 07/19/2022    FIBR 284 06/08/2020     POC:   Lab Results   Component Value Date    BGM 83 11/08/2018     HEPATIC:   Lab Results   Component Value Date    ALBUMIN 3.8 06/12/2023    PROTTOTAL 6.7 06/12/2023    ALT 24 06/12/2023    AST 30 06/12/2023    ALKPHOS 67 06/12/2023    BILITOTAL 0.8 06/12/2023     OTHER:   Lab Results   Component Value Date    PH 7.33 (L) 06/09/2020    LACT 1.1 11/04/2018    A1C 6.0 (H) 10/21/2022    HEMA 9.7 06/12/2023    PHOS 2.9 11/05/2018    MAG 1.9 06/13/2020    LIPASE 26 06/12/2023    TSH 0.68 09/02/2021    T4 0.89 01/08/2009    CRP 6.9 03/09/2021    SED 21 (H) 04/17/2023       Anesthesia Plan    ASA Status:  3   NPO Status:  NPO Appropriate    Anesthesia Type: General.     - Airway: ETT   Induction: Intravenous.   Maintenance: Balanced.         Consents    Anesthesia Plan(s) and associated risks, benefits, and realistic alternatives discussed. Questions answered and patient/representative(s) expressed understanding.    - Discussed:     - Discussed with:  Patient    Use of blood products discussed: Yes.     - Discussed with: Patient.     - Consented: consented to blood products            Reason for refusal: other.     Postoperative Care    Pain management: IV analgesics.   PONV prophylaxis: Ondansetron (or other 5HT-3)     Comments:                STACIE STEELE MD

## 2023-06-20 ENCOUNTER — APPOINTMENT (OUTPATIENT)
Dept: PHYSICAL THERAPY | Facility: CLINIC | Age: 63
DRG: 467 | End: 2023-06-20
Attending: ORTHOPAEDIC SURGERY
Payer: COMMERCIAL

## 2023-06-20 LAB
BASOPHILS # BLD AUTO: 0 10E3/UL (ref 0–0.2)
BASOPHILS NFR BLD AUTO: 0 %
EOSINOPHIL # BLD AUTO: 0 10E3/UL (ref 0–0.7)
EOSINOPHIL NFR BLD AUTO: 0 %
ERYTHROCYTE [DISTWIDTH] IN BLOOD BY AUTOMATED COUNT: 17.2 % (ref 10–15)
GLUCOSE BLDC GLUCOMTR-MCNC: 114 MG/DL (ref 70–99)
HCT VFR BLD AUTO: 27.9 % (ref 40–53)
HGB BLD-MCNC: 8.5 G/DL (ref 13.3–17.7)
HGB BLD-MCNC: 8.9 G/DL (ref 13.3–17.7)
IMM GRANULOCYTES # BLD: 0 10E3/UL
IMM GRANULOCYTES NFR BLD: 1 %
LYMPHOCYTES # BLD AUTO: 0.7 10E3/UL (ref 0.8–5.3)
LYMPHOCYTES NFR BLD AUTO: 8 %
MCH RBC QN AUTO: 28.5 PG (ref 26.5–33)
MCHC RBC AUTO-ENTMCNC: 30.5 G/DL (ref 31.5–36.5)
MCV RBC AUTO: 94 FL (ref 78–100)
MONOCYTES # BLD AUTO: 0.8 10E3/UL (ref 0–1.3)
MONOCYTES NFR BLD AUTO: 10 %
NEUTROPHILS # BLD AUTO: 6.7 10E3/UL (ref 1.6–8.3)
NEUTROPHILS NFR BLD AUTO: 81 %
NRBC # BLD AUTO: 0 10E3/UL
NRBC BLD AUTO-RTO: 0 /100
PLATELET # BLD AUTO: 210 10E3/UL (ref 150–450)
RBC # BLD AUTO: 2.98 10E6/UL (ref 4.4–5.9)
WBC # BLD AUTO: 8.2 10E3/UL (ref 4–11)

## 2023-06-20 PROCEDURE — 85018 HEMOGLOBIN: CPT | Performed by: ORTHOPAEDIC SURGERY

## 2023-06-20 PROCEDURE — 97530 THERAPEUTIC ACTIVITIES: CPT | Mod: GP

## 2023-06-20 PROCEDURE — 250N000013 HC RX MED GY IP 250 OP 250 PS 637: Performed by: PHYSICIAN ASSISTANT

## 2023-06-20 PROCEDURE — 97116 GAIT TRAINING THERAPY: CPT | Mod: GP

## 2023-06-20 PROCEDURE — 250N000012 HC RX MED GY IP 250 OP 636 PS 637: Performed by: ORTHOPAEDIC SURGERY

## 2023-06-20 PROCEDURE — 120N000001 HC R&B MED SURG/OB

## 2023-06-20 PROCEDURE — 250N000011 HC RX IP 250 OP 636: Performed by: ORTHOPAEDIC SURGERY

## 2023-06-20 PROCEDURE — 250N000013 HC RX MED GY IP 250 OP 250 PS 637: Performed by: ORTHOPAEDIC SURGERY

## 2023-06-20 PROCEDURE — 36415 COLL VENOUS BLD VENIPUNCTURE: CPT | Performed by: ORTHOPAEDIC SURGERY

## 2023-06-20 PROCEDURE — 250N000011 HC RX IP 250 OP 636: Performed by: PHYSICIAN ASSISTANT

## 2023-06-20 RX ORDER — ASPIRIN 81 MG/1
81 TABLET ORAL DAILY
Status: ON HOLD | COMMUNITY
Start: 2023-06-20 | End: 2023-08-22

## 2023-06-20 RX ORDER — ASPIRIN 81 MG/1
81 TABLET ORAL 2 TIMES DAILY
Qty: 60 TABLET | Refills: 0 | DISCHARGE
Start: 2023-06-20 | End: 2023-08-20

## 2023-06-20 RX ORDER — ACETAMINOPHEN 325 MG/1
650 TABLET ORAL EVERY 4 HOURS PRN
Qty: 100 TABLET | Refills: 0 | DISCHARGE
Start: 2023-06-22 | End: 2023-06-27 | Stop reason: ALTCHOICE

## 2023-06-20 RX ORDER — CEFAZOLIN SODIUM 1 G/3ML
1 INJECTION, POWDER, FOR SOLUTION INTRAMUSCULAR; INTRAVENOUS EVERY 8 HOURS
Status: COMPLETED | OUTPATIENT
Start: 2023-06-20 | End: 2023-06-22

## 2023-06-20 RX ORDER — OXYCODONE HYDROCHLORIDE 5 MG/1
5-10 TABLET ORAL EVERY 4 HOURS PRN
Qty: 20 TABLET | Refills: 0 | Status: SHIPPED | OUTPATIENT
Start: 2023-06-20 | End: 2023-06-23

## 2023-06-20 RX ADMIN — ASPIRIN 81 MG: 81 TABLET, COATED ORAL at 08:15

## 2023-06-20 RX ADMIN — SENNOSIDES AND DOCUSATE SODIUM 1 TABLET: 50; 8.6 TABLET ORAL at 08:15

## 2023-06-20 RX ADMIN — LATANOPROST 1 DROP: 50 SOLUTION/ DROPS OPHTHALMIC at 21:57

## 2023-06-20 RX ADMIN — OXYCODONE HYDROCHLORIDE 5 MG: 5 TABLET ORAL at 23:39

## 2023-06-20 RX ADMIN — CEFAZOLIN 1 G: 1 INJECTION, POWDER, FOR SOLUTION INTRAMUSCULAR; INTRAVENOUS at 11:21

## 2023-06-20 RX ADMIN — OXYCODONE HYDROCHLORIDE 5 MG: 5 TABLET ORAL at 02:51

## 2023-06-20 RX ADMIN — HYDROXYZINE HYDROCHLORIDE 25 MG: 25 TABLET, FILM COATED ORAL at 23:39

## 2023-06-20 RX ADMIN — HYDROXYZINE HYDROCHLORIDE 25 MG: 25 TABLET, FILM COATED ORAL at 08:15

## 2023-06-20 RX ADMIN — OXYCODONE HYDROCHLORIDE 5 MG: 5 TABLET ORAL at 08:15

## 2023-06-20 RX ADMIN — POLYETHYLENE GLYCOL 3350 17 G: 17 POWDER, FOR SOLUTION ORAL at 08:14

## 2023-06-20 RX ADMIN — MYCOPHENOLATE MOFETIL 750 MG: 250 CAPSULE ORAL at 08:15

## 2023-06-20 RX ADMIN — FLUTICASONE FUROATE AND VILANTEROL TRIFENATATE 1 PUFF: 100; 25 POWDER RESPIRATORY (INHALATION) at 08:28

## 2023-06-20 RX ADMIN — ACETAMINOPHEN 975 MG: 325 TABLET ORAL at 21:46

## 2023-06-20 RX ADMIN — ACETAMINOPHEN 975 MG: 325 TABLET ORAL at 14:34

## 2023-06-20 RX ADMIN — ASPIRIN 81 MG: 81 TABLET, COATED ORAL at 21:46

## 2023-06-20 RX ADMIN — OXYCODONE HYDROCHLORIDE 5 MG: 5 TABLET ORAL at 16:52

## 2023-06-20 RX ADMIN — ACETAMINOPHEN 975 MG: 325 TABLET ORAL at 06:21

## 2023-06-20 RX ADMIN — CEFAZOLIN 1 G: 1 INJECTION, POWDER, FOR SOLUTION INTRAMUSCULAR; INTRAVENOUS at 21:47

## 2023-06-20 RX ADMIN — CEFAZOLIN 1 G: 1 INJECTION, POWDER, FOR SOLUTION INTRAMUSCULAR; INTRAVENOUS at 03:57

## 2023-06-20 RX ADMIN — MULTIPLE VITAMINS W/ MINERALS TAB 1 TABLET: TAB at 08:15

## 2023-06-20 RX ADMIN — METOPROLOL SUCCINATE 12.5 MG: 25 TABLET, EXTENDED RELEASE ORAL at 08:15

## 2023-06-20 RX ADMIN — PREDNISONE 5 MG: 5 TABLET ORAL at 08:15

## 2023-06-20 RX ADMIN — ROSUVASTATIN CALCIUM 20 MG: 20 TABLET, FILM COATED ORAL at 08:15

## 2023-06-20 RX ADMIN — PANTOPRAZOLE SODIUM 40 MG: 40 TABLET, DELAYED RELEASE ORAL at 08:15

## 2023-06-20 RX ADMIN — SENNOSIDES AND DOCUSATE SODIUM 1 TABLET: 50; 8.6 TABLET ORAL at 21:47

## 2023-06-20 RX ADMIN — MYCOPHENOLATE MOFETIL 750 MG: 250 CAPSULE ORAL at 21:46

## 2023-06-20 RX ADMIN — HYDROXYZINE HYDROCHLORIDE 25 MG: 25 TABLET, FILM COATED ORAL at 16:53

## 2023-06-20 RX ADMIN — ISOSORBIDE MONONITRATE 60 MG: 30 TABLET, EXTENDED RELEASE ORAL at 08:15

## 2023-06-20 RX ADMIN — FLUOXETINE 40 MG: 20 CAPSULE ORAL at 08:15

## 2023-06-20 RX ADMIN — ENTECAVIR 0.5 MG: 0.5 TABLET ORAL at 06:22

## 2023-06-20 ASSESSMENT — ACTIVITIES OF DAILY LIVING (ADL)
ADLS_ACUITY_SCORE: 23
ADLS_ACUITY_SCORE: 22
ADLS_ACUITY_SCORE: 22
ADLS_ACUITY_SCORE: 23
ADLS_ACUITY_SCORE: 22
ADLS_ACUITY_SCORE: 23
ADLS_ACUITY_SCORE: 23
ADLS_ACUITY_SCORE: 22
ADLS_ACUITY_SCORE: 22
ADLS_ACUITY_SCORE: 23

## 2023-06-20 NOTE — PROGRESS NOTES
"ORTHOPEDIC LOWER EXTREMITY PROGRESS NOTE    POD#1  Patient is a 63 year old male who underwent Procedure(s):  RIGHT HIP REVISION on 2023. Pain is well controlled. Tolerating medication well, no nausea or vomiting.  Blackwood removed this morning, due to void.  Planning to discharge to TCU.    Vitals:   Blood pressure 125/88, pulse 79, temperature 97.9  F (36.6  C), temperature source Oral, resp. rate 16, height 1.676 m (5' 6\"), weight 80.7 kg (178 lb), SpO2 96 %.  Temp (24hrs), Av.1  F (36.7  C), Min:97.8  F (36.6  C), Max:98.6  F (37  C)      Drains: None    EXAM   The patient is awake and alert.  Calves are soft and non-tender.   Sensation is intact.  Dorsiflexion and plantar flexion is intact.  Foot warm with nl cap refill.  The incision is covered with prevena incisional vac, empty cannister.     Labs:   Recent Labs   Lab Test 23  0650 23  0559 23  1049 10/21/22  0908 22  1036 21  1301 21  1001 20  0500 20  0500   HGB 8.9* 14.7 14.6   < > 14.4   < > 14.0   < >  --    INR  --   --   --   --  1.04  --  1.03  --  1.36*    < > = values in this interval not displayed.     CX: no organism seen on gram stain  ASSESSMENT  S/p R hip revision   PLAN  1. DVT prophylaxis: aspirin  2. Weight Bearing: WBAT (Weight bearing as tolerated).  3. Anticipated discharge date pending placement . Discharge to Skilled Nursing Facility, SW consulted, appreciate assistance.  4. Cont Pain Control Oxycodone and Tylenol    Twyla Maldonado PA-C  [facility]        "

## 2023-06-20 NOTE — ANESTHESIA POSTPROCEDURE EVALUATION
Patient: Jerad Ross    Procedure: Procedure(s):  RIGHT HIP REVISION       Anesthesia Type:  General    Note:  Disposition: Inpatient   Postop Pain Control: Uneventful            Sign Out: Well controlled pain   PONV: No   Neuro/Psych: Uneventful            Sign Out: Acceptable/Baseline neuro status   Airway/Respiratory: Uneventful            Sign Out: Acceptable/Baseline resp. status   CV/Hemodynamics: Uneventful            Sign Out: Acceptable CV status; No obvious hypovolemia; No obvious fluid overload   Other NRE: NONE   DID A NON-ROUTINE EVENT OCCUR? No           Last vitals:  Vitals Value Taken Time   /69 06/19/23 1345   Temp 36.6  C (97.8  F) 06/19/23 1210   Pulse 76 06/19/23 1357   Resp 26 06/19/23 1357   SpO2 96 % 06/19/23 1357   Vitals shown include unvalidated device data.    Electronically Signed By: Jeovany Vasquez DO  June 19, 2023  7:33 PM

## 2023-06-20 NOTE — PLAN OF CARE
[id number]    AOx4.  VSS on 2L NC.  PIV SL.  Pain controlled with scheduled tylenol and oxy x1 this shift.  Dressing CDI. CMS intact.  Wound vac at 125mmHg, no output.  Blackwood in place, adequate output, to pull POD 1 in the AM.  PIV SL, Intermittent IV ABX.  Discharge to TCU pending.

## 2023-06-20 NOTE — PROGRESS NOTES
Patient vital signs are at baseline:Yes  Patient able to ambulate as they were prior to admission or with assist devices provided by therapies during their stay:  Yes  Patient MUST void prior to discharge: Yes  Patient able to tolerate oral intake:Yes  Pain has adequate pain control using Oral analgesics:  Yes  Does patient have an identified :No, Pt is discharging to a TCU  Has goal D/C date and time been discussed with patient:  TBD    Pt is alert and  Oriented x 4, VSS on RA,  PIV SL, assist of 1 with walker and gait belt. Dressing is CDI, CMS intact. Pt denied having any numbness and tingling. Pt also has a wound vac. Pain was managed with schedule tylenol, PRN oxycodone and Atarax.

## 2023-06-20 NOTE — PLAN OF CARE
Occupational Therapy: Orders received. Chart reviewed and discussed with care team, including IP PT. Chart indicates plans for TCU and per discussion with IP PT, pt with limited tolerance for two therapy disciplines. All therapy needs are being met with IP PT. Will defer therapy recommendations to IP PT. Will complete orders.

## 2023-06-20 NOTE — PROGRESS NOTES
Notified provider about indwelling mcelroy catheter discussed removal or continued need.    Did provider choose to remove indwelling mcelroy catheter? Yes    Provider's mcelroy indication for keeping indwelling mcelroy catheter: No    Is there an order for indwelling mcelroy catheter?No    *If there is a plan to keep mcelroy catheter in place at discharge daily notification with provider is not necessary, but please add a notation in the treatment team sticky note that the patient will be discharging with the catheter.

## 2023-06-21 ENCOUNTER — APPOINTMENT (OUTPATIENT)
Dept: PHYSICAL THERAPY | Facility: CLINIC | Age: 63
DRG: 467 | End: 2023-06-21
Attending: ORTHOPAEDIC SURGERY
Payer: COMMERCIAL

## 2023-06-21 DIAGNOSIS — R06.2 WHEEZING: ICD-10-CM

## 2023-06-21 LAB
GLUCOSE SERPL-MCNC: 89 MG/DL (ref 70–99)
HGB BLD-MCNC: 8.3 G/DL (ref 13.3–17.7)

## 2023-06-21 PROCEDURE — 97116 GAIT TRAINING THERAPY: CPT | Mod: GP

## 2023-06-21 PROCEDURE — 97530 THERAPEUTIC ACTIVITIES: CPT | Mod: GP

## 2023-06-21 PROCEDURE — 250N000012 HC RX MED GY IP 250 OP 636 PS 637: Performed by: ORTHOPAEDIC SURGERY

## 2023-06-21 PROCEDURE — 250N000011 HC RX IP 250 OP 636: Performed by: PHYSICIAN ASSISTANT

## 2023-06-21 PROCEDURE — 250N000013 HC RX MED GY IP 250 OP 250 PS 637: Performed by: ORTHOPAEDIC SURGERY

## 2023-06-21 PROCEDURE — 85018 HEMOGLOBIN: CPT | Performed by: ORTHOPAEDIC SURGERY

## 2023-06-21 PROCEDURE — 120N000001 HC R&B MED SURG/OB

## 2023-06-21 PROCEDURE — 250N000013 HC RX MED GY IP 250 OP 250 PS 637: Performed by: PHYSICIAN ASSISTANT

## 2023-06-21 PROCEDURE — 82947 ASSAY GLUCOSE BLOOD QUANT: CPT | Performed by: ORTHOPAEDIC SURGERY

## 2023-06-21 PROCEDURE — 36415 COLL VENOUS BLD VENIPUNCTURE: CPT | Performed by: ORTHOPAEDIC SURGERY

## 2023-06-21 RX ADMIN — FLUOXETINE 40 MG: 20 CAPSULE ORAL at 09:06

## 2023-06-21 RX ADMIN — CEFAZOLIN 1 G: 1 INJECTION, POWDER, FOR SOLUTION INTRAMUSCULAR; INTRAVENOUS at 20:58

## 2023-06-21 RX ADMIN — ACETAMINOPHEN 975 MG: 325 TABLET ORAL at 21:40

## 2023-06-21 RX ADMIN — ASPIRIN 81 MG: 81 TABLET, COATED ORAL at 20:56

## 2023-06-21 RX ADMIN — ENTECAVIR 0.5 MG: 0.5 TABLET ORAL at 06:13

## 2023-06-21 RX ADMIN — HYDROXYZINE HYDROCHLORIDE 25 MG: 25 TABLET, FILM COATED ORAL at 11:34

## 2023-06-21 RX ADMIN — CEFAZOLIN 1 G: 1 INJECTION, POWDER, FOR SOLUTION INTRAMUSCULAR; INTRAVENOUS at 11:35

## 2023-06-21 RX ADMIN — OXYCODONE HYDROCHLORIDE 5 MG: 5 TABLET ORAL at 20:56

## 2023-06-21 RX ADMIN — HYDROXYZINE HYDROCHLORIDE 25 MG: 25 TABLET, FILM COATED ORAL at 20:56

## 2023-06-21 RX ADMIN — METOPROLOL SUCCINATE 12.5 MG: 25 TABLET, EXTENDED RELEASE ORAL at 09:07

## 2023-06-21 RX ADMIN — ISOSORBIDE MONONITRATE 60 MG: 30 TABLET, EXTENDED RELEASE ORAL at 09:06

## 2023-06-21 RX ADMIN — MULTIPLE VITAMINS W/ MINERALS TAB 1 TABLET: TAB at 09:06

## 2023-06-21 RX ADMIN — ACETAMINOPHEN 975 MG: 325 TABLET ORAL at 13:51

## 2023-06-21 RX ADMIN — LATANOPROST 1 DROP: 50 SOLUTION/ DROPS OPHTHALMIC at 21:40

## 2023-06-21 RX ADMIN — FLUTICASONE FUROATE AND VILANTEROL TRIFENATATE 1 PUFF: 100; 25 POWDER RESPIRATORY (INHALATION) at 09:25

## 2023-06-21 RX ADMIN — POLYETHYLENE GLYCOL 3350 17 G: 17 POWDER, FOR SOLUTION ORAL at 09:07

## 2023-06-21 RX ADMIN — PANTOPRAZOLE SODIUM 40 MG: 40 TABLET, DELAYED RELEASE ORAL at 09:06

## 2023-06-21 RX ADMIN — ROSUVASTATIN CALCIUM 20 MG: 20 TABLET, FILM COATED ORAL at 09:06

## 2023-06-21 RX ADMIN — ACETAMINOPHEN 975 MG: 325 TABLET ORAL at 06:13

## 2023-06-21 RX ADMIN — SENNOSIDES AND DOCUSATE SODIUM 1 TABLET: 50; 8.6 TABLET ORAL at 09:06

## 2023-06-21 RX ADMIN — CEFAZOLIN 1 G: 1 INJECTION, POWDER, FOR SOLUTION INTRAMUSCULAR; INTRAVENOUS at 06:13

## 2023-06-21 RX ADMIN — ASPIRIN 81 MG: 81 TABLET, COATED ORAL at 09:06

## 2023-06-21 RX ADMIN — MYCOPHENOLATE MOFETIL 750 MG: 250 CAPSULE ORAL at 20:56

## 2023-06-21 RX ADMIN — PREDNISONE 5 MG: 5 TABLET ORAL at 09:07

## 2023-06-21 RX ADMIN — SENNOSIDES AND DOCUSATE SODIUM 1 TABLET: 50; 8.6 TABLET ORAL at 20:56

## 2023-06-21 RX ADMIN — OXYCODONE HYDROCHLORIDE 10 MG: 5 TABLET ORAL at 11:34

## 2023-06-21 RX ADMIN — MYCOPHENOLATE MOFETIL 750 MG: 250 CAPSULE ORAL at 09:06

## 2023-06-21 ASSESSMENT — ACTIVITIES OF DAILY LIVING (ADL)
ADLS_ACUITY_SCORE: 26
ADLS_ACUITY_SCORE: 23
DEPENDENT_IADLS:: INDEPENDENT
ADLS_ACUITY_SCORE: 23

## 2023-06-21 NOTE — CONSULTS
Care Management Initial Consult    General Information  Assessment completed with: Patient, Patient  Type of CM/SW Visit: Initial Assessment    Primary Care Provider verified and updated as needed: Yes   Readmission within the last 30 days: no previous admission in last 30 days      Reason for Consult: discharge planning  Advance Care Planning:            Communication Assessment  Patient's communication style: spoken language (English or Bilingual)    Hearing Difficulty or Deaf: no   Wear Glasses or Blind: no    Cognitive  Cognitive/Neuro/Behavioral: WDL  Level of Consciousness: alert     Orientation: oriented x 4             Living Environment:   People in home: friend(s)     Current living Arrangements: house      Able to return to prior arrangements: yes       Family/Social Support:  Care provided by: self  Provides care for: no one  Marital Status:   Neighbor          Description of Support System:           Current Resources:   Patient receiving home care services: No     Community Resources: None  Equipment currently used at home: cane, straight  Supplies currently used at home: None    Employment/Financial:  Employment Status: retired        Financial Concerns: No concerns identified   Referral to Financial Worker: Yes       Does the patient's insurance plan have a 3 day qualifying hospital stay waiver?  Yes   Will the waiver be used for post-acute placement? No    Lifestyle & Psychosocial Needs:  Social Determinants of Health     Tobacco Use: Medium Risk (6/20/2023)    Patient History      Smoking Tobacco Use: Former      Smokeless Tobacco Use: Former      Passive Exposure: Not on file   Alcohol Use: Not on file   Financial Resource Strain: Not on file   Food Insecurity: Not on file   Transportation Needs: Not on file   Physical Activity: Not on file   Stress: Not on file   Social Connections: Not on file   Intimate Partner Violence: Not on file   Depression: At risk (6/15/2023)    PHQ-2      PHQ-2  Score: 3   Housing Stability: Not on file       Functional Status:  Prior to admission patient needed assistance:   Dependent ADLs:: Independent, Ambulation-cane  Dependent IADLs:: Independent       Mental Health Status:  Mental Health Status: No Current Concerns       Chemical Dependency Status:  Chemical Dependency Status: No Current Concerns             Values/Beliefs:  Spiritual, Cultural Beliefs, Pentecostal Practices, Values that affect care: yes       Cultural/Pentecostal Practices Patient Routinely Participates In: prayer       Additional Information:  CM Initial Consult    Patient is a 63 year old male who underwent Procedure(s):  RIGHT HIP REVISION on 6/19/2023. Pain is well controlled. Tolerating medication well, no nausea or vomiting.  PT here to work with patient.  Planning to discharge to TCU.    Writer met with the patient in their room. Patient confirmed their PCP. Patient stated that they lived at home with a roommate. Patient stated that they moved independently at home and used a cane when they went outside. Patient stated that they have grab bars in the shower and toilet. Patient stated that they have a walker on both floors of the home. Patient stated that they have reachers and use a shower chair. Patient stated no connection to a [facility]. Patient stated that they use [employer] and a cleaning service agency. Patient stated that they are independent in dressing, shower, doing their own laundry, and can manage their own medication. Patient stated that some of these tasks are dificult for them to do but are able to do them. Patient stated that they were working as a  before hospitalization but do not anymore. Patient stated that they were earning majority of their income in their job but were also earning social security. Patient stated that they manage their own money and are kind of struggling. Patient accepted to be connected to a . Patient stated no other concern.      Patient stated that they have been to previous TCU's and would like referrals sent to them. Patient stated that they would like to be transported via [facility], but are wondering if their insurance would cover it. Writer stated that they will ask and follow back. Patient stated that depending on how much a private room is they would like to do that. Writer will send referrals to the following facilities in the [facility];  - [facility]  - [facility]  - [facility].     Writer will follow up with the patient with updates.    Ilya Pa

## 2023-06-21 NOTE — PLAN OF CARE
A&Ox4, VSS on 2L NC, pain controlled with scheduled tylenlol and oxy x1. Wound vac 125mmHg, no output, incision site CDI. Voiding fine in BR, PIV SL, discharge to TCU pending

## 2023-06-21 NOTE — PROGRESS NOTES
"ORTHOPEDIC LOWER EXTREMITY PROGRESS NOTE    POD#2  Patient is a 63 year old male who underwent Procedure(s):  RIGHT HIP REVISION on 2023. Pain is well controlled. Tolerating medication well, no nausea or vomiting.  PT here to work with patient.  Planning to discharge to TCU.    Vitals:   Blood pressure 122/73, pulse 75, temperature 98.6  F (37  C), temperature source Oral, resp. rate 16, height 1.676 m (5' 6\"), weight 80.7 kg (178 lb), SpO2 92 %.  Temp (24hrs), Av.1  F (36.7  C), Min:97.8  F (36.6  C), Max:98.6  F (37  C)      Drains: None    EXAM   The patient is awake and alert.  Calves are soft and non-tender.   Sensation is intact.  Dorsiflexion and plantar flexion is intact.  Foot warm with nl cap refill.  The incision is covered with prevena incisional vac, empty cannister.     Labs:   Recent Labs   Lab Test 23  0633 23  1612 23  0650 10/21/22  0908 22  1036 21  1301 21  1001 20  0500 20  0500   HGB 8.3* 8.5* 8.9*   < > 14.4   < > 14.0   < >  --    INR  --   --   --   --  1.04  --  1.03  --  1.36*    < > = values in this interval not displayed.     CX: NGTD  ASSESSMENT  S/p R hip revision   Acute blood loss anemia - stable  PLAN  1. DVT prophylaxis: aspirin  2. Weight Bearing: WBAT (Weight bearing as tolerated).  3. Anticipated discharge date pending placement . Discharge to Skilled Nursing Facility, SW consulted, appreciate assistance.  Spoke with SW and they plan to see patient today.  4. Cont Pain Control Oxycodone and Tylenol   5. Anemia - likely multifactorial due to acute surgical blood loss and hemodilution.  Hgb has been stable for past few days.  Continue to monitor will inpatient.    Twyla Maldonado PA-C  [facility]        "

## 2023-06-22 ENCOUNTER — APPOINTMENT (OUTPATIENT)
Dept: PHYSICAL THERAPY | Facility: CLINIC | Age: 63
DRG: 467 | End: 2023-06-22
Attending: ORTHOPAEDIC SURGERY
Payer: COMMERCIAL

## 2023-06-22 LAB
BASOPHILS # BLD AUTO: 0.1 10E3/UL (ref 0–0.2)
BASOPHILS NFR BLD AUTO: 1 %
EOSINOPHIL # BLD AUTO: 0.3 10E3/UL (ref 0–0.7)
EOSINOPHIL NFR BLD AUTO: 4 %
ERYTHROCYTE [DISTWIDTH] IN BLOOD BY AUTOMATED COUNT: 17.1 % (ref 10–15)
GLUCOSE BLDC GLUCOMTR-MCNC: 95 MG/DL (ref 70–99)
HCT VFR BLD AUTO: 28.4 % (ref 40–53)
HGB BLD-MCNC: 9 G/DL (ref 13.3–17.7)
IMM GRANULOCYTES # BLD: 0.1 10E3/UL
IMM GRANULOCYTES NFR BLD: 1 %
LYMPHOCYTES # BLD AUTO: 1 10E3/UL (ref 0.8–5.3)
LYMPHOCYTES NFR BLD AUTO: 12 %
MCH RBC QN AUTO: 29.6 PG (ref 26.5–33)
MCHC RBC AUTO-ENTMCNC: 31.7 G/DL (ref 31.5–36.5)
MCV RBC AUTO: 93 FL (ref 78–100)
MONOCYTES # BLD AUTO: 0.6 10E3/UL (ref 0–1.3)
MONOCYTES NFR BLD AUTO: 7 %
NEUTROPHILS # BLD AUTO: 6.5 10E3/UL (ref 1.6–8.3)
NEUTROPHILS NFR BLD AUTO: 75 %
NRBC # BLD AUTO: 0.1 10E3/UL
NRBC BLD AUTO-RTO: 1 /100
PLATELET # BLD AUTO: 243 10E3/UL (ref 150–450)
RBC # BLD AUTO: 3.04 10E6/UL (ref 4.4–5.9)
WBC # BLD AUTO: 8.5 10E3/UL (ref 4–11)

## 2023-06-22 PROCEDURE — 97116 GAIT TRAINING THERAPY: CPT | Mod: GP

## 2023-06-22 PROCEDURE — 85025 COMPLETE CBC W/AUTO DIFF WBC: CPT | Performed by: ORTHOPAEDIC SURGERY

## 2023-06-22 PROCEDURE — 250N000011 HC RX IP 250 OP 636: Performed by: PHYSICIAN ASSISTANT

## 2023-06-22 PROCEDURE — 36415 COLL VENOUS BLD VENIPUNCTURE: CPT | Performed by: ORTHOPAEDIC SURGERY

## 2023-06-22 PROCEDURE — 250N000013 HC RX MED GY IP 250 OP 250 PS 637: Performed by: ORTHOPAEDIC SURGERY

## 2023-06-22 PROCEDURE — 250N000012 HC RX MED GY IP 250 OP 636 PS 637: Performed by: ORTHOPAEDIC SURGERY

## 2023-06-22 PROCEDURE — 120N000001 HC R&B MED SURG/OB

## 2023-06-22 PROCEDURE — 97530 THERAPEUTIC ACTIVITIES: CPT | Mod: GP

## 2023-06-22 RX ORDER — BUDESONIDE 90 UG/1
2 AEROSOL, POWDER RESPIRATORY (INHALATION) 2 TIMES DAILY
Qty: 3 EACH | Refills: 2 | Status: SHIPPED | OUTPATIENT
Start: 2023-06-22 | End: 2023-08-30

## 2023-06-22 RX ORDER — BUDESONIDE 90 UG/1
AEROSOL, POWDER RESPIRATORY (INHALATION)
OUTPATIENT
Start: 2023-06-22

## 2023-06-22 RX ADMIN — SENNOSIDES AND DOCUSATE SODIUM 1 TABLET: 50; 8.6 TABLET ORAL at 20:32

## 2023-06-22 RX ADMIN — FLUTICASONE FUROATE AND VILANTEROL TRIFENATATE 1 PUFF: 100; 25 POWDER RESPIRATORY (INHALATION) at 08:28

## 2023-06-22 RX ADMIN — ASPIRIN 81 MG: 81 TABLET, COATED ORAL at 20:32

## 2023-06-22 RX ADMIN — POLYETHYLENE GLYCOL 3350 17 G: 17 POWDER, FOR SOLUTION ORAL at 08:28

## 2023-06-22 RX ADMIN — ENTECAVIR 0.5 MG: 0.5 TABLET ORAL at 06:24

## 2023-06-22 RX ADMIN — MULTIPLE VITAMINS W/ MINERALS TAB 1 TABLET: TAB at 08:27

## 2023-06-22 RX ADMIN — OXYCODONE HYDROCHLORIDE 5 MG: 5 TABLET ORAL at 13:00

## 2023-06-22 RX ADMIN — ISOSORBIDE MONONITRATE 60 MG: 30 TABLET, EXTENDED RELEASE ORAL at 08:27

## 2023-06-22 RX ADMIN — OXYCODONE HYDROCHLORIDE 5 MG: 5 TABLET ORAL at 08:25

## 2023-06-22 RX ADMIN — MYCOPHENOLATE MOFETIL 750 MG: 250 CAPSULE ORAL at 08:28

## 2023-06-22 RX ADMIN — ROSUVASTATIN CALCIUM 20 MG: 20 TABLET, FILM COATED ORAL at 08:27

## 2023-06-22 RX ADMIN — CEFAZOLIN 1 G: 1 INJECTION, POWDER, FOR SOLUTION INTRAMUSCULAR; INTRAVENOUS at 03:49

## 2023-06-22 RX ADMIN — ASPIRIN 81 MG: 81 TABLET, COATED ORAL at 08:27

## 2023-06-22 RX ADMIN — OXYCODONE HYDROCHLORIDE 10 MG: 5 TABLET ORAL at 20:36

## 2023-06-22 RX ADMIN — ACETAMINOPHEN 650 MG: 325 TABLET ORAL at 13:00

## 2023-06-22 RX ADMIN — PANTOPRAZOLE SODIUM 40 MG: 40 TABLET, DELAYED RELEASE ORAL at 08:28

## 2023-06-22 RX ADMIN — LATANOPROST 1 DROP: 50 SOLUTION/ DROPS OPHTHALMIC at 22:57

## 2023-06-22 RX ADMIN — FLUOXETINE 40 MG: 20 CAPSULE ORAL at 08:27

## 2023-06-22 RX ADMIN — MYCOPHENOLATE MOFETIL 750 MG: 250 CAPSULE ORAL at 20:31

## 2023-06-22 RX ADMIN — ACETAMINOPHEN 650 MG: 325 TABLET ORAL at 06:24

## 2023-06-22 RX ADMIN — PREDNISONE 5 MG: 5 TABLET ORAL at 08:27

## 2023-06-22 ASSESSMENT — ACTIVITIES OF DAILY LIVING (ADL)
ADLS_ACUITY_SCORE: 26

## 2023-06-22 NOTE — PLAN OF CARE
Goal Outcome Evaluation:           Overall Patient Progress: improvingOverall Patient Progress: improving       Patient vital signs are at baseline: Yes  Patient able to ambulate as they were prior to admission or with assist devices provided by therapies during their stay:  No,  Reason:  TCU   Patient MUST void prior to discharge:  Yes  Patient able to tolerate oral intake:  Yes  Pain has adequate pain control using Oral analgesics:  Yes  Does patient have an identified :  Yes  Has goal D/C date and time been discussed with patient:  Yes      PT alert and oriented X4. Able to make needs known. Denied SOB, CP, N/V. Supposed to discharge to TCU but county called and cancelled due to paperwork issue. PIV pulled. Switched over to community wound vac running at 125, no drainage. . Tolerated pain well with oral tylenol and oxycodone 5.

## 2023-06-22 NOTE — PROGRESS NOTES
Jerad Ross  2023  POD #3    Doing well.  Pain well-controlled.  Tolerating physical therapy and rehabilitation well.  Temperatures:  Current - Temp: 98.7  F (37.1  C); Max - Temp  Av.6  F (37  C)  Min: 98.3  F (36.8  C)  Max: 98.8  F (37.1  C)  Pulse range: Pulse  Av  Min: 65  Max: 86  Blood pressure range: Systolic (24hrs), Av , Min:105 , Max:128   ; Diastolic (24hrs), Av, Min:66, Max:72    CMS: intact  Labs:   Results for orders placed or performed during the hospital encounter of 23 (from the past 24 hour(s))   Glucose by meter   Result Value Ref Range    GLUCOSE BY METER POCT 95 70 - 99 mg/dL   CBC with platelets differential    Narrative    The following orders were created for panel order CBC with platelets differential.  Procedure                               Abnormality         Status                     ---------                               -----------         ------                     CBC with platelets and d...[[id number]]  Abnormal            Final result                 Please view results for these tests on the individual orders.   CBC with platelets and differential   Result Value Ref Range    WBC Count 8.5 4.0 - 11.0 10e3/uL    RBC Count 3.04 (L) 4.40 - 5.90 10e6/uL    Hemoglobin 9.0 (L) 13.3 - 17.7 g/dL    Hematocrit 28.4 (L) 40.0 - 53.0 %    MCV 93 78 - 100 fL    MCH 29.6 26.5 - 33.0 pg    MCHC 31.7 31.5 - 36.5 g/dL    RDW 17.1 (H) 10.0 - 15.0 %    Platelet Count 243 150 - 450 10e3/uL    % Neutrophils 75 %    % Lymphocytes 12 %    % Monocytes 7 %    % Eosinophils 4 %    % Basophils 1 %    % Immature Granulocytes 1 %    NRBCs per 100 WBC 1 (H) <1 /100    Absolute Neutrophils 6.5 1.6 - 8.3 10e3/uL    Absolute Lymphocytes 1.0 0.8 - 5.3 10e3/uL    Absolute Monocytes 0.6 0.0 - 1.3 10e3/uL    Absolute Eosinophils 0.3 0.0 - 0.7 10e3/uL    Absolute Basophils 0.1 0.0 - 0.2 10e3/uL    Absolute Immature Granulocytes 0.1 <=0.4 10e3/uL    Absolute NRBCs 0.1 10e3/uL     *Note:  Due to a large number of results and/or encounters for the requested time period, some results have not been displayed. A complete set of results can be found in Results Review.       PLAN:   awaiting tcuplacement    Seems there is some confusion in [facility]   [facility]

## 2023-06-22 NOTE — PROGRESS NOTES
Care Management Discharge Note    Discharge Date: 2023       Discharge Disposition: Skilled Nursing Facility, Transitional Care    Discharge Services: None    Discharge DME: None    Discharge Transportation: health plan transportation    Private pay costs discussed: private room/amenity fees    Does the patient's insurance plan have a 3 day qualifying hospital stay waiver?  Yes   Will the waiver be used for post-acute placement? No    PAS Confirmation Code: [id number]  Patient/family educated on Medicare website which has current facility and service quality ratings: yes    Education Provided on the Discharge Plan:    Persons Notified of Discharge Plans: Patient   Patient/Family in Agreement with the Plan: yes    Handoff Referral Completed: Yes    Additional Information:  Writer received a call from three of the referrals that were sent stating that they had accepted. Writer updated the patient, patient decided to go for [facility]. Writer called the facility, [phone] and spoke with Nieves who stated that they can accept the patient in today into a private room for $24/ Day. Writer informed nieves that the patient was on a prevena wound vac. Writer called  [facility] [phone] and spoke with Fabian to set up a wheelchair ride between [phone]. Writer updated the Nursing staff. Writer called PA-C, [phone] and asked for the orders to be signed. Writer sent over the orders. Writer completed the PAS, however, writer put in the wrong year for the patient's . Writer called the senior linkage line number [phone] and spoke with Heather who stated that they can correct it and that the patient should still be able to discharge to the facility. Writer called Nieves over at [facility] to let her know of the correction. Writer faxed over the PAS and scripts over to the facility [phone]. Writer will update the patient.     Addendum 1508:  Writer got a call from Shelia with the  Pre admission screening team with [facility] [phone]. Shelia was stating that the patient needs to complete a face to face assessment due to not meeting the levels to need skilled nursing. Shelia emphasized that the patient cannot discharge from the hospital and that she will speak with her supervisor to figure out when was the soonest they can do the assessment.  tried to call [facility] immediately to cancel the ride, but Anne stated that they were already on site. Writer went to the patient's room and ran into [facility] and told them they cannot discharge. Writer informed the patient  the reason mentioned above. Patient stated it was understandable. Writer updated Nursing staff. Writer called the facility and spoke with Kaelyn to update her. Writer will follow up.    Addendum 1537:  Minnier got a call back from Shelia from [facility] stating that she was unable to get a hold of her supervisor. Shelia stated that she left a VM to the insurance care coordinator to schedule the face to face assessment. Shelia stated that they had encountered the same issue with this patient back in April. Writer will follow up.    Addendum 1639:  Writer was informed by TAMICA Leal that the [facility] Care Coordinator will try to correct the mistake for tomorrow. Writer will follow.  ESTEE Parekh  [facility]  Social Work

## 2023-06-22 NOTE — PROGRESS NOTES
"ORTHOPEDIC LOWER EXTREMITY PROGRESS NOTE    POD#3  Patient is a 63 year old male who underwent Procedure(s):  RIGHT HIP REVISION on 2023. Pain is well controlled. Tolerating medication well, no nausea or vomiting. Awaiting TCU placement.    Vitals:   Blood pressure 105/67, pulse 86, temperature 98.7  F (37.1  C), temperature source Oral, resp. rate 18, height 1.676 m (5' 6\"), weight 80.7 kg (178 lb), SpO2 92 %.  Temp (24hrs), Av.1  F (36.7  C), Min:97.8  F (36.6  C), Max:98.6  F (37  C)      Drains: None    EXAM   The patient is awake and alert.  Calves are soft and non-tender.   Sensation is intact.  Dorsiflexion and plantar flexion is intact.  Foot warm with nl cap refill.  The incision is covered with prevena incisional vac, empty cannister.     Labs:   Recent Labs   Lab Test 23  0633 23  1612 23  0650 10/21/22  0908 22  1036 21  1301 21  1001 20  0500 20  0500   HGB 8.3* 8.5* 8.9*   < > 14.4   < > 14.0   < >  --    INR  --   --   --   --  1.04  --  1.03  --  1.36*    < > = values in this interval not displayed.     CX: NGTD  ASSESSMENT  S/p R hip revision   Acute blood loss anemia - stable  PLAN  1. DVT prophylaxis: aspirin  2. Weight Bearing: WBAT (Weight bearing as tolerated).  3. Anticipated discharge date pending placement . Discharge to Skilled Nursing Facility, SW consulted, appreciate assistance.  Spoke with SW and they plan to see patient today.  4. Cont Pain Control Oxycodone and Tylenol   5. Anemia - likely multifactorial due to acute surgical blood loss and hemodilution.  Hgb has been stable for last 3 days.  Continue to monitor will inpatient.    Twyla Maldonado PA-C  [facility]        "

## 2023-06-22 NOTE — PLAN OF CARE
Goal Outcome Evaluation:  Denies CP, SOB. No discharge today per  note. Prevena wound vac on. All pt needs met at this time.

## 2023-06-23 VITALS
HEIGHT: 66 IN | RESPIRATION RATE: 18 BRPM | DIASTOLIC BLOOD PRESSURE: 74 MMHG | TEMPERATURE: 98.3 F | OXYGEN SATURATION: 93 % | SYSTOLIC BLOOD PRESSURE: 119 MMHG | WEIGHT: 178 LBS | HEART RATE: 86 BPM | BODY MASS INDEX: 28.61 KG/M2

## 2023-06-23 DIAGNOSIS — Z98.890 POSTOPERATIVE STATE: ICD-10-CM

## 2023-06-23 PROCEDURE — 250N000012 HC RX MED GY IP 250 OP 636 PS 637: Performed by: ORTHOPAEDIC SURGERY

## 2023-06-23 PROCEDURE — 250N000013 HC RX MED GY IP 250 OP 250 PS 637: Performed by: ORTHOPAEDIC SURGERY

## 2023-06-23 PROCEDURE — 250N000013 HC RX MED GY IP 250 OP 250 PS 637: Performed by: PHYSICIAN ASSISTANT

## 2023-06-23 RX ORDER — OXYCODONE HYDROCHLORIDE 5 MG/1
5-10 TABLET ORAL EVERY 4 HOURS PRN
Qty: 20 TABLET | Refills: 0 | Status: SHIPPED | OUTPATIENT
Start: 2023-06-23 | End: 2023-06-23

## 2023-06-23 RX ADMIN — ACETAMINOPHEN 650 MG: 325 TABLET ORAL at 09:36

## 2023-06-23 RX ADMIN — ROSUVASTATIN CALCIUM 20 MG: 20 TABLET, FILM COATED ORAL at 09:25

## 2023-06-23 RX ADMIN — ISOSORBIDE MONONITRATE 60 MG: 30 TABLET, EXTENDED RELEASE ORAL at 10:29

## 2023-06-23 RX ADMIN — POLYETHYLENE GLYCOL 3350 17 G: 17 POWDER, FOR SOLUTION ORAL at 09:25

## 2023-06-23 RX ADMIN — PANTOPRAZOLE SODIUM 40 MG: 40 TABLET, DELAYED RELEASE ORAL at 09:26

## 2023-06-23 RX ADMIN — PREDNISONE 5 MG: 5 TABLET ORAL at 09:26

## 2023-06-23 RX ADMIN — MYCOPHENOLATE MOFETIL 750 MG: 250 CAPSULE ORAL at 09:25

## 2023-06-23 RX ADMIN — OXYCODONE HYDROCHLORIDE 5 MG: 5 TABLET ORAL at 09:36

## 2023-06-23 RX ADMIN — ASPIRIN 81 MG: 81 TABLET, COATED ORAL at 09:25

## 2023-06-23 RX ADMIN — ENTECAVIR 0.5 MG: 0.5 TABLET ORAL at 06:57

## 2023-06-23 RX ADMIN — FLUTICASONE FUROATE AND VILANTEROL TRIFENATATE 1 PUFF: 100; 25 POWDER RESPIRATORY (INHALATION) at 09:35

## 2023-06-23 RX ADMIN — ACETAMINOPHEN 650 MG: 325 TABLET ORAL at 15:51

## 2023-06-23 RX ADMIN — METOPROLOL SUCCINATE 12.5 MG: 25 TABLET, EXTENDED RELEASE ORAL at 09:15

## 2023-06-23 RX ADMIN — MULTIPLE VITAMINS W/ MINERALS TAB 1 TABLET: TAB at 09:26

## 2023-06-23 RX ADMIN — FLUOXETINE 40 MG: 20 CAPSULE ORAL at 09:26

## 2023-06-23 ASSESSMENT — ACTIVITIES OF DAILY LIVING (ADL)
ADLS_ACUITY_SCORE: 26
ADLS_ACUITY_SCORE: 26
ADLS_ACUITY_SCORE: 25
ADLS_ACUITY_SCORE: 25
ADLS_ACUITY_SCORE: 26
ADLS_ACUITY_SCORE: 25
ADLS_ACUITY_SCORE: 26
ADLS_ACUITY_SCORE: 26

## 2023-06-23 NOTE — PROGRESS NOTES
"ORTHOPEDIC LOWER EXTREMITY PROGRESS NOTE    POD#4  Patient is a 63 year old male who underwent Procedure(s):  RIGHT HIP REVISION on 2023. Pain is well controlled. Tolerating medication well, no nausea or vomiting. Awaiting TCU placement, hopefully today.    Vitals:   Blood pressure 115/69, pulse 90, temperature 98.6  F (37  C), temperature source Oral, resp. rate 18, height 1.676 m (5' 6\"), weight 80.7 kg (178 lb), SpO2 93 %.  Temp (24hrs), Av.1  F (36.7  C), Min:97.8  F (36.6  C), Max:98.6  F (37  C)      Drains: prevena in place    EXAM   The patient is awake and alert.  Calves are soft and non-tender.   Sensation is intact.  Dorsiflexion and plantar flexion is intact.  Foot warm with nl cap refill.  The incision is covered with prevena incisional vac, empty cannister.     Labs:   Recent Labs   Lab Test 23  1237 23  0633 23  1612 10/21/22  0908 22  1036 21  1301 21  1001 20  0500 20  0500   HGB 9.0* 8.3* 8.5*   < > 14.4   < > 14.0   < >  --    INR  --   --   --   --  1.04  --  1.03  --  1.36*    < > = values in this interval not displayed.     CX: NGTD  ASSESSMENT  S/p R hip revision   PLAN  1. DVT prophylaxis: aspirin  2. Weight Bearing: WBAT (Weight bearing as tolerated).  3. Anticipated discharge date pending placement . Discharge to Skilled Nursing Facility, SW consulted, appreciate assistance.    4. Cont Pain Control Oxycodone and Tylenol  Kjerstin Foss PA-C [facility]          "

## 2023-06-23 NOTE — PLAN OF CARE
Goal Outcome Evaluation:  Patient vital signs are at baseline: yes  Patient able to ambulate as they were prior to admission or with assist devices provided by therapies during their stay:  yes  Patient MUST void prior to discharge:  yes  Patient able to tolerate oral intake:  yes  Pain has adequate pain control using Oral analgesics:  yes  Does patient have an identified :  yes  Has goal D/C date and time been discussed with patient:  yes  Alert and oriented X4, VSS on RA. Up with assist of one with walker and gait belt. Pain managed with prn oxycodone.

## 2023-06-23 NOTE — PLAN OF CARE
Goal Outcome Evaluation:    A/Ox4. VSS on RA. Up SBA w/ GB and walker. CMS intact. Pain is managed by PRN Tylenol and Oxycodone. Good appetite. Voiding well. R hip dressing CDI with Prevena wound vac in place. Discharged to [facility] via [facility] w/c transport. All belongings sent.

## 2023-06-23 NOTE — PROGRESS NOTES
Care Management Follow Up    Length of Stay (days): 4    Expected Discharge Date: 06/23/2023     Concerns to be Addressed: all concerns addressed in this encounter     Patient plan of care discussed at interdisciplinary rounds: Yes    Anticipated Discharge Disposition: Skilled Nursing Facility, Transitional Care     Anticipated Discharge Services: None  Anticipated Discharge DME: None    Patient/family educated on Medicare website which has current facility and service quality ratings: yes  Education Provided on the Discharge Plan:    Patient/Family in Agreement with the Plan: yes    Referrals Placed by CM/SW:    Private pay costs discussed: Not applicable    Additional Information:  Writer called shelia over at [facility] [phone] and left a VM to get any updates. Writer received a call back from [facility] stating that they wont be doing the face to face assessment, but that a case management person will be doing it from the insurance. Writer called the insurance person at [phone] and spoke with Baron who stated that I should call the Senior linkage line to get the date corrected or create a new PAS. Writer called the Senior linkage line [phone] and spoke with Heather about what was stated by Baron. Heather stated that that should be no issue. Writer called [facility] again [phone] and left them a VM to let them know what was going to happen. Writer called [facility] to update them as well [phone] and spoke with nieves conti who acknowledged the update. Writer inquired with TAMICA Leal who stated that according to Shelia at [facility] only the Care coordinator over at [facility] can make that change because they have that authority over the [facility]. Writer called Baron again and stated that she needed to make that change. Baron stated she will make the change and contact [facility] and [facility] to make sure they are aware that she will make  the change. Writer will call Shelia to follow up.     Addendum 1158:  Writer got a call from Shelia who stated that they have not gotten a call or update from [facility], but that If they have stated that they are going to make the changes then [facility] wont follow anymore. Writer called [facility] and left a VM. Writer called [facility] and spoke with Alanis to set up a wheelchair transport ride at [phone]. Writer will update Nursing Staff, patient and facility.     Addendum 1426:  Writer called Kaelyn from [facility] at [phone] and left a VM about what time transport was set.     ESTEE Parekh  [facility]  Social Work

## 2023-06-24 ENCOUNTER — TELEPHONE (OUTPATIENT)
Dept: GERIATRICS | Facility: CLINIC | Age: 63
End: 2023-06-24

## 2023-06-24 DIAGNOSIS — Z95.1 S/P CABG (CORONARY ARTERY BYPASS GRAFT): Primary | ICD-10-CM

## 2023-06-24 LAB — BACTERIA TISS BX CULT: NO GROWTH

## 2023-06-25 NOTE — PROGRESS NOTES
Discontinue isosorbide 30mg PO BID and increase to 60mg PO BID as directed on discharge paperwork.    Dr Vicky HIGGINS DNP

## 2023-06-26 ENCOUNTER — TELEPHONE (OUTPATIENT)
Dept: INTERNAL MEDICINE | Facility: CLINIC | Age: 63
End: 2023-06-26

## 2023-06-26 LAB — BACTERIA TISS BX CULT: NORMAL

## 2023-06-26 NOTE — TELEPHONE ENCOUNTER
[facility] Call Center    Phone Message    May a detailed message be left on voicemail: yes     Reason for Call: Medication Question or concern regarding medication   Prescription Clarification  Name of Medication:    budesonide (PULMICORT FLEXHALER) 90 MCG/ACT inhaler     Prescribing Provider: Dr. Bhatia    Pharmacy:  [facility] - [place], [place] - [address] AT [address]     What on the order needs clarification? Pharmacy calling to be sure the clinic knows this medication requires a PA.          Action Taken: Message routed to:  [facility] pcc    Travel Screening: Not Applicable

## 2023-06-26 NOTE — PLAN OF CARE
Physical Therapy Discharge Summary    Reason for therapy discharge:    Discharged to transitional care facility.    Progress towards therapy goal(s). See goals on Care Plan in [facility] electronic health record for goal details.  Goals not met.  Barriers to achieving goals:   discharge from facility.    Therapy recommendation(s):    Continued therapy is recommended.  Rationale/Recommendations:  To progress independence and safety with functional mobility.

## 2023-06-26 NOTE — TELEPHONE ENCOUNTER
Prior Authorization Retail Medication Request    Medication/Dose: budesonide (PULMICORT FLEXHALER) 90 MCG/ACT inhaler  Inhale 2 puffs into the lungs 2 times daily   ICD code (if different than what is on RX):    Previously Tried and Failed:    Rationale:      Insurance Name:    Insurance ID:        Pharmacy Information (if different than what is on RX)  Name:    Phone:

## 2023-06-26 NOTE — TELEPHONE ENCOUNTER
PA Initiation    Medication: PULMICORT FLEXHALER 90 MCG/ACT IN AEPB  Insurance Company: [employer] - Phone [phone] Fax [phone]  Pharmacy Filling the Rx: [facility] - [place], [place] - [address] AT [address]  Filling Pharmacy Phone: [phone]  Filling Pharmacy Fax: [phone]  Start Date: 6/26/2023

## 2023-06-27 ENCOUNTER — TRANSITIONAL CARE UNIT VISIT (OUTPATIENT)
Dept: GERIATRICS | Facility: CLINIC | Age: 63
End: 2023-06-27
Payer: COMMERCIAL

## 2023-06-27 VITALS
WEIGHT: 180.9 LBS | TEMPERATURE: 97.8 F | DIASTOLIC BLOOD PRESSURE: 79 MMHG | BODY MASS INDEX: 27.41 KG/M2 | HEART RATE: 73 BPM | OXYGEN SATURATION: 95 % | RESPIRATION RATE: 16 BRPM | HEIGHT: 68 IN | SYSTOLIC BLOOD PRESSURE: 111 MMHG

## 2023-06-27 DIAGNOSIS — T84.018S FAILED TOTAL HIP ARTHROPLASTY, SEQUELA: ICD-10-CM

## 2023-06-27 DIAGNOSIS — M25.551 HIP PAIN, RIGHT: ICD-10-CM

## 2023-06-27 DIAGNOSIS — B18.1 CHRONIC HEPATITIS B (H): ICD-10-CM

## 2023-06-27 DIAGNOSIS — M87.051 AVASCULAR NECROSIS OF BONES OF BOTH HIPS (H): Primary | ICD-10-CM

## 2023-06-27 DIAGNOSIS — I10 HYPERTENSION, UNSPECIFIED TYPE: ICD-10-CM

## 2023-06-27 DIAGNOSIS — Z71.89 ACP (ADVANCE CARE PLANNING): ICD-10-CM

## 2023-06-27 DIAGNOSIS — Z94.0 KIDNEY REPLACED BY TRANSPLANT: ICD-10-CM

## 2023-06-27 DIAGNOSIS — N18.6 END STAGE RENAL DISEASE (H): ICD-10-CM

## 2023-06-27 DIAGNOSIS — B18.1 CHRONIC VIRAL HEPATITIS B WITHOUT DELTA AGENT AND WITHOUT COMA (H): ICD-10-CM

## 2023-06-27 DIAGNOSIS — M87.052 AVASCULAR NECROSIS OF BONES OF BOTH HIPS (H): Primary | ICD-10-CM

## 2023-06-27 DIAGNOSIS — M62.81 GENERALIZED MUSCLE WEAKNESS: ICD-10-CM

## 2023-06-27 DIAGNOSIS — Z96.649 FAILED TOTAL HIP ARTHROPLASTY, SEQUELA: ICD-10-CM

## 2023-06-27 PROCEDURE — 99309 SBSQ NF CARE MODERATE MDM 30: CPT | Performed by: NURSE PRACTITIONER

## 2023-06-27 PROCEDURE — 99497 ADVNCD CARE PLAN 30 MIN: CPT | Performed by: NURSE PRACTITIONER

## 2023-06-27 RX ORDER — ENTECAVIR 0.5 MG/1
0.5 TABLET, FILM COATED ORAL DAILY
Qty: 90 TABLET | Refills: 1 | Status: SHIPPED | OUTPATIENT
Start: 2023-06-27 | End: 2024-01-22

## 2023-06-27 RX ORDER — ACETAMINOPHEN 500 MG
1000 TABLET ORAL 3 TIMES DAILY
Qty: 1 TABLET | Refills: 0
Start: 2023-06-27 | End: 2023-08-20

## 2023-06-27 NOTE — PROGRESS NOTES
[facility] GERIATRICS  Primary Care Provider & Clinic: Aston Perry MD, [address] [facility] / [facility] [address]  Chief Complaint   Patient presents with     Hospital F/U     [facility] 6/19/2023 - 6/23/2023     [place] Medical Record Number: [id number]  Place of Service Where Encounter Took Place: [facility] ([facility]) [[id number]]    Jerad Ross is a 63 year old (1960), admitted to the above facility from  [facility]. Hospital stay 6/19/23 through 6/23/23.    HPI:    Jerad Ross is a 63 year old male with PMH significant for CAD status post CABG in 2020, status post ESRD with bilateral kidney transplant, hypertension and history of splenectomy in 1978 who was admitted to [facility] on 6/19/2023 by the Orthopedic service for right hip revision with Dr. Mcdonough. No immediate postoperative complications,  mL. Preoperative H&P reviewed.  Transfer to TCU.    Patient seen today for follow up, good historian, post R hip revision, vac dressing intact clean and dry, no discharge in canister, WBAT, pain 6/10 with movement, mild difficulties completing therapies, overall appears healthy.     Advance Care Planning Goals of Care Discussion 6/27/2023  Goals of care discussed with Jerad Ross on 6/23. Present at discussion: patient. Questions discussed and patient response:  What is your understanding now of where you are with your illness?: good. How much information about what is likely to be ahead with your illness would you like to have?: all. As the clinician I communicated the following to the patient regarding their prognosis: good. If your health situation worsens, what are your most important goals?: hip healed, ambulate. What are your biggest fears and worries about the future with your health?: kidneys. Unacceptable function : NA. What abilities are so critical to your life that you cannot imagine living without them?:  7independence. 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 If you become sicker, how much are you willing to go through for the possibility of gaining more time?: willing. Would this change if these were permanent states, if they did not get better?: maybe. How much does your agent and/or family know about your priorities and wishes?: most of it. Added by RONALDO Izaguirre CNP        CODE STATUS/ADVANCE DIRECTIVES DISCUSSION: Prior - on file  Patient's living condition: lives with roommate  ALLERGIES:   Allergies   Allergen Reactions     Penicillins Shortness Of Breath and Hives     Keflex [Cephalexin Hcl] Unknown     Patient could not recall reaction     Sulfa Antibiotics Rash     Tetracycline Unknown     Patient could not recall reaction      PAST MEDICAL HISTORY:   Past Medical History:   Diagnosis Date     Acute midline low back pain without sciatica      AION (acute ischemic optic neuropathy)      Anemia in chronic renal disease      Arthritis      Avascular necrosis of bones of both hips (H)     s/p bilateral hip replacements     Avascular necrosis of bones of both hips (H)     s/p bilateral hip replacements     Basal cell carcinoma      Chronic hepatitis B (H)      Clostridium difficile colitis      COPD (chronic obstructive pulmonary disease) (H)      Coronary artery disease involving native coronary artery of native heart without angina pectoris 06/17/2014    Coronary angiogram 6/17/14: Severe distal 3-vessel disease involving small vessels, not amenable to PCI or CABG.     CRP elevated      Depression      Depressive disorder      Diverticulosis      Dyslipidemia      Elevated C-reactive protein (CRP)      FSGS (focal segmental glomerulosclerosis)      FSGS (focal segmental glomerulosclerosis)      Gastric ulcer with hemorrhage 02/12/2012     Gastric ulcer with hemorrhage 02/12/2012     GERD (gastroesophageal reflux disease)      Glaucoma     OHTN     Glaucoma      Hemorrhoids      Hemorrhoids      History of  blood transfusion      HTN (hypertension)      Hyperlipidemia      Hypertension secondary to other renal disorders      Hypogonadism in male      Immunosuppressed status (H)      Kidney replaced by transplant     focal glomerulosclerosis      Kidney transplanted     focal glomerulosclerosis      NSTEMI (non-ST elevated myocardial infarction) (H) 06/17/2014     NSTEMI (non-ST elevated myocardial infarction) (H) 06/17/2014     JULIANE (obstructive sleep apnea)     Doesn't use CPAP     Paracentral scotoma     LE     Secondary renal hyperparathyroidism (H)      Secondary renal hyperparathyroidism (H)      Septic bursitis      Squamous cell carcinoma      Uncomplicated asthma       PAST SURGICAL HISTORY:  has a past surgical history that includes Extracapsular cataract extration with intraocular lens implant (4-20-10, 6-1-10); hernia repair (1995); hip surgery; knee surgery (1983, 1987); Colectomy subtotal (1983); Kidney surgery (1978 and 1993); Splenectomy (1978); Colonoscopy (02/13/2012); Esophagoscopy, gastroscopy, duodenoscopy (EGD), combined (02/13/2012); cataract iol, rt/lt (04/19/2000); cataract iol, rt/lt (06/01/2000); Mohs micrographic procedure; Tonsillectomy; appendectomy; Colonoscopy (N/A, 01/22/2020); Cardiac Bypass surgery (06/08/2020); Cataract Extraction Extracapsular W/ Intraocular Lens Implantation (Bilateral, 4-20-10, 6-1-10); hernia repair (01/01/1995); hip surgery (01/01/1981); Colectomy Partial (01/01/1983); Esophagoscopy, gastroscopy, duodenoscopy (EGD), combined (02/13/2012); Phacoemulsification clear cornea with standard intraocular lens implant (Right, 04/19/2000); Phacoemulsification clear cornea with standard intraocular lens implant (Left, 06/01/2000); Mohs micrographic procedure; other surgical history; Cv Coronary Angiogram (N/A, 06/02/2020); Cv Left Heart Catheterization Without Left Ventriculogram (Left, 06/02/2020); Coronary Angiogram (N/A, 09/20/2021); Supravalvular Aortagram (N/A,  09/20/2021); IR Fine Needle Aspiration w Ultrasound (04/10/2023); biopsy; orthopedic surgery; transplant; vascular surgery (1976); and Arthroplasty revision hip (Right, 6/19/2023).  FAMILY HISTORY: family history includes Cancer in his paternal grandmother; Cardiovascular in his father; Cerebrovascular Disease in his maternal grandfather and paternal grandfather; Hypertension in his father; Kidney Disease in his father, paternal aunt, and paternal aunt; Other - See Comments in his father and maternal grandfather; Ovarian Cancer in his paternal grandmother.  SOCIAL HISTORY:  reports that he quit smoking about 35 years ago. His smoking use included cigarettes. He started smoking about 43 years ago. He has a 9.00 pack-year smoking history. He has quit using smokeless tobacco. He reports that he does not currently use alcohol. He reports that he does not currently use drugs after having used the following drugs: Oxycodone.    Post Discharge Medication Reconciliation Status:  MED REC REQUIRED  Post Medication Reconciliation Status: discharge medications reconciled and changed, per note/orders    Current Outpatient Medications   Medication Sig     acetaminophen (TYLENOL) 500 MG tablet Take 2 tablets (1,000 mg) by mouth 3 times daily     albuterol (PROAIR HFA) 108 (90 Base) MCG/ACT inhaler Inhale 2 puffs into the lungs every 6 hours as needed for shortness of breath / dyspnea or wheezing     aspirin 81 MG EC tablet Resume usual dose of aspirin after finishing increased dose prescribed after surgery     aspirin 81 MG EC tablet Take 1 tablet (81 mg) by mouth 2 times daily     budesonide (PULMICORT FLEXHALER) 90 MCG/ACT inhaler Inhale 2 puffs into the lungs 2 times daily     calcium citrate-vitamin D (CITRACAL) 315-200 MG-UNIT TABS Take 1 tablet by mouth daily.     entecavir (BARACLUDE) 0.5 MG tablet Take 1 tablet (0.5 mg) by mouth daily     FLUoxetine (PROZAC) 40 MG capsule Take 1 capsule (40 mg) by mouth daily      fluticasone-salmeterol (ADVAIR) 250-50 MCG/ACT inhaler Inhale 1 puff into the lungs 2 times daily as needed (PRN)     furosemide (LASIX) 20 MG tablet Take 1 tablet (20 mg) by mouth daily as needed (fluid retention)     isosorbide mononitrate (IMDUR) 30 MG 24 hr tablet Take 1 tablet (30 mg) by mouth 2 times daily (Taking 30 mg tablets BID from previous Rx until complete)     isosorbide mononitrate (IMDUR) 30 MG 24 hr tablet Take 30 mg by mouth 2 times daily Switch to 60mg tablet once daily when supply gone     isosorbide mononitrate (IMDUR) 60 MG 24 hr tablet Take 60 mg by mouth daily Start taking when 30mg tablets gone     latanoprost (XALATAN) 0.005 % ophthalmic solution Place 1 drop into both eyes At Bedtime     Lidocaine (LIDOCARE) 4 % Patch Place 1 patch onto the skin daily as needed for moderate pain To prevent lidocaine toxicity, patient should be patch free for 12 hrs daily.     metoprolol succinate ER (TOPROL XL) 25 MG 24 hr tablet Take 0.5 tablets (12.5 mg) by mouth daily     Multiple Vitamins-Iron (ONE DAILY MULTIVITAMIN/IRON) TABS Take 1 tablet by mouth daily     mycophenolate (GENERIC EQUIVALENT) 250 MG capsule Take 3 capsules (750 mg) by mouth 2 times daily     nitroGLYcerin (NITROSTAT) 0.4 MG sublingual tablet Place 1 tablet (0.4 mg) under the tongue every 5 minutes as needed for chest pain     Omega-3 Fatty Acids (OMEGA 3 PO) Take 1 capsule by mouth daily Dose unknown     pantoprazole (PROTONIX) 40 MG EC tablet Take 1 tablet (40 mg) by mouth daily     polyethylene glycol (MIRALAX) 17 g packet Take 17 g by mouth daily as needed      predniSONE (DELTASONE) 5 MG tablet Take 1 tablet (5 mg) by mouth daily     rosuvastatin (CRESTOR) 20 MG tablet TAKE 1 TABLET(20 MG) BY MOUTH DAILY     tacrolimus (PROTOPIC) 0.1 % external ointment Apply topically 2 times daily as needed     No current facility-administered medications for this visit.     ROS:  4 point ROS including Respiratory, CV, GI and , other than  "that noted in the HPI,  is negative    Vitals:  /79   Pulse 73   Temp 97.8  F (36.6  C)   Resp 16   Ht 1.727 m (5' 8\")   Wt 82.1 kg (180 lb 14.4 oz)   SpO2 95%   BMI 27.51 kg/m    Exam:  GENERAL APPEARANCE:  in no distress, appears healthy  ENT:  Mouth and posterior oropharynx normal, moist mucous membranes, normal hearing acuity  RESP:  lungs clear to auscultation , no respiratory distress  CV:  regular rate and rhythm, no murmur, rub, or gallop, no edema  ABDOMEN:  bowel sounds normal  M/S:   Gait and station abnormal unsteady gait  SKIN:  Inspection of skin and subcutaneous tissue baseline  NEURO:   Examination of sensation by touch normal  PSYCH:  affect and mood normal    Lab/Diagnostic Data:  Recent labs in [facility] reviewed by me today.     ASSESSMENT/PLAN:  (M87.051,  M87.052) Avascular necrosis of bones of both hips (H)  (primary encounter diagnosis)  (T84.018S,  Z96.649) Failed total hip arthroplasty, sequela  (M25.551) Hip pain, right  (M62.81) Generalized muscle weakness  Comment: post R hip revision 6/19, afebrile, incision no discharge, pain 6/10 with movement  Plan:   -PT/OT eval and treat  -CBC, BMP on 7/3  -clarify imdur 60mg/day  -change APAP to TID scheduled  -ASA81 BID for DVT prophylaxis x30 days  -continue oxycodone 5-10mg Q4h PRN, lidocaine patch  -follow up Dr.Teynor kitchen on 7/10      (N18.6) End stage renal disease (H)  (Z94.0) Kidney replaced by transplant  Comment: Xplant in 1993, reports creat +-1.0  Plan:   -continue mycophenolate, prednisone  -follow up nephrology PRN  -periodic BMP's    (I10) Hypertension, unspecified type  Comment: SBP's in the 110-120 range  Plan: continue lasix, metoprolol  -staff to check VS daily      (Z71.89) ACP (advance care planning)  Comment: code confirmed, Full  Plan: DC ADVANCE CARE PLANNING FIRST 30 MINS          I spent 16 minutes F2F with patient in addition to todays visit completing a POLST form.        Electronically signed by: Nolan Gonzalez " RONALDO Santoyo CNP

## 2023-06-27 NOTE — LETTER
6/27/2023        RE: Jerad Ross  [address] [place] [address]        [facility] GERIATRICS  Primary Care Provider & Clinic: Aston Perry MD, [address] [facility] [address]  Chief Complaint   Patient presents with     Hospital F/U     [facility] 6/19/2023 - 6/23/2023     [place] Medical Record Number: [id number]  Place of Service Where Encounter Took Place: [facility] ([facility]) [[id number]]    Jerad Ross is a 63 year old (1960), admitted to the above facility from  [facility]. Hospital stay 6/19/23 through 6/23/23.    HPI:    Jerad Ross is a 63 year old male with PMH significant for CAD status post CABG in 2020, status post ESRD with bilateral kidney transplant, hypertension and history of splenectomy in 1978 who was admitted to [facility] on 6/19/2023 by the Orthopedic service for right hip revision with Dr. Mcdonough. No immediate postoperative complications,  mL. Preoperative H&P reviewed.  Transfer to TCU.    Patient seen today for follow up, good historian, post R hip revision, vac dressing intact clean and dry, no discharge in canister, WBAT, pain 6/10 with movement, mild difficulties completing therapies, overall appears healthy.     Advance Care Planning Goals of Care Discussion 6/27/2023  Goals of care discussed with Jerad Ross on 6/23. Present at discussion: patient. Questions discussed and patient response:  What is your understanding now of where you are with your illness?: good. How much information about what is likely to be ahead with your illness would you like to have?: all. As the clinician I communicated the following to the patient regarding their prognosis: good. If your health situation worsens, what are your most important goals?: hip healed, ambulate. What are your biggest fears and worries about the future with your health?: kidneys. Unacceptable function : NA. What  abilities are so critical to your life that you cannot imagine living without them?: 7independence. 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 If you become sicker, how much are you willing to go through for the possibility of gaining more time?: willing. Would this change if these were permanent states, if they did not get better?: maybe. How much does your agent and/or family know about your priorities and wishes?: most of it. Added by RONALDO Izaguirre CNP        CODE STATUS/ADVANCE DIRECTIVES DISCUSSION: Prior - on file  Patient's living condition: lives with roommate  ALLERGIES:   Allergies   Allergen Reactions     Penicillins Shortness Of Breath and Hives     Keflex [Cephalexin Hcl] Unknown     Patient could not recall reaction     Sulfa Antibiotics Rash     Tetracycline Unknown     Patient could not recall reaction      PAST MEDICAL HISTORY:   Past Medical History:   Diagnosis Date     Acute midline low back pain without sciatica      AION (acute ischemic optic neuropathy)      Anemia in chronic renal disease      Arthritis      Avascular necrosis of bones of both hips (H)     s/p bilateral hip replacements     Avascular necrosis of bones of both hips (H)     s/p bilateral hip replacements     Basal cell carcinoma      Chronic hepatitis B (H)      Clostridium difficile colitis      COPD (chronic obstructive pulmonary disease) (H)      Coronary artery disease involving native coronary artery of native heart without angina pectoris 06/17/2014    Coronary angiogram 6/17/14: Severe distal 3-vessel disease involving small vessels, not amenable to PCI or CABG.     CRP elevated      Depression      Depressive disorder      Diverticulosis      Dyslipidemia      Elevated C-reactive protein (CRP)      FSGS (focal segmental glomerulosclerosis)      FSGS (focal segmental glomerulosclerosis)      Gastric ulcer with hemorrhage 02/12/2012     Gastric ulcer with hemorrhage 02/12/2012     GERD (gastroesophageal reflux disease)       Glaucoma     OHTN     Glaucoma      Hemorrhoids      Hemorrhoids      History of blood transfusion      HTN (hypertension)      Hyperlipidemia      Hypertension secondary to other renal disorders      Hypogonadism in male      Immunosuppressed status (H)      Kidney replaced by transplant     focal glomerulosclerosis      Kidney transplanted     focal glomerulosclerosis      NSTEMI (non-ST elevated myocardial infarction) (H) 06/17/2014     NSTEMI (non-ST elevated myocardial infarction) (H) 06/17/2014     JULIANE (obstructive sleep apnea)     Doesn't use CPAP     Paracentral scotoma     LE     Secondary renal hyperparathyroidism (H)      Secondary renal hyperparathyroidism (H)      Septic bursitis      Squamous cell carcinoma      Uncomplicated asthma       PAST SURGICAL HISTORY:  has a past surgical history that includes Extracapsular cataract extration with intraocular lens implant (4-20-10, 6-1-10); hernia repair (1995); hip surgery; knee surgery (1983, 1987); Colectomy subtotal (1983); Kidney surgery (1978 and 1993); Splenectomy (1978); Colonoscopy (02/13/2012); Esophagoscopy, gastroscopy, duodenoscopy (EGD), combined (02/13/2012); cataract iol, rt/lt (04/19/2000); cataract iol, rt/lt (06/01/2000); Mohs micrographic procedure; Tonsillectomy; appendectomy; Colonoscopy (N/A, 01/22/2020); Cardiac Bypass surgery (06/08/2020); Cataract Extraction Extracapsular W/ Intraocular Lens Implantation (Bilateral, 4-20-10, 6-1-10); hernia repair (01/01/1995); hip surgery (01/01/1981); Colectomy Partial (01/01/1983); Esophagoscopy, gastroscopy, duodenoscopy (EGD), combined (02/13/2012); Phacoemulsification clear cornea with standard intraocular lens implant (Right, 04/19/2000); Phacoemulsification clear cornea with standard intraocular lens implant (Left, 06/01/2000); Mohs micrographic procedure; other surgical history; Cv Coronary Angiogram (N/A, 06/02/2020); Cv Left Heart Catheterization Without Left Ventriculogram (Left,  06/02/2020); Coronary Angiogram (N/A, 09/20/2021); Supravalvular Aortagram (N/A, 09/20/2021); IR Fine Needle Aspiration w Ultrasound (04/10/2023); biopsy; orthopedic surgery; transplant; vascular surgery (1976); and Arthroplasty revision hip (Right, 6/19/2023).  FAMILY HISTORY: family history includes Cancer in his paternal grandmother; Cardiovascular in his father; Cerebrovascular Disease in his maternal grandfather and paternal grandfather; Hypertension in his father; Kidney Disease in his father, paternal aunt, and paternal aunt; Other - See Comments in his father and maternal grandfather; Ovarian Cancer in his paternal grandmother.  SOCIAL HISTORY:  reports that he quit smoking about 35 years ago. His smoking use included cigarettes. He started smoking about 43 years ago. He has a 9.00 pack-year smoking history. He has quit using smokeless tobacco. He reports that he does not currently use alcohol. He reports that he does not currently use drugs after having used the following drugs: Oxycodone.    Post Discharge Medication Reconciliation Status:  MED REC REQUIRED  Post Medication Reconciliation Status: discharge medications reconciled and changed, per note/orders    Current Outpatient Medications   Medication Sig     acetaminophen (TYLENOL) 500 MG tablet Take 2 tablets (1,000 mg) by mouth 3 times daily     albuterol (PROAIR HFA) 108 (90 Base) MCG/ACT inhaler Inhale 2 puffs into the lungs every 6 hours as needed for shortness of breath / dyspnea or wheezing     aspirin 81 MG EC tablet Resume usual dose of aspirin after finishing increased dose prescribed after surgery     aspirin 81 MG EC tablet Take 1 tablet (81 mg) by mouth 2 times daily     budesonide (PULMICORT FLEXHALER) 90 MCG/ACT inhaler Inhale 2 puffs into the lungs 2 times daily     calcium citrate-vitamin D (CITRACAL) 315-200 MG-UNIT TABS Take 1 tablet by mouth daily.     entecavir (BARACLUDE) 0.5 MG tablet Take 1 tablet (0.5 mg) by mouth daily      FLUoxetine (PROZAC) 40 MG capsule Take 1 capsule (40 mg) by mouth daily     fluticasone-salmeterol (ADVAIR) 250-50 MCG/ACT inhaler Inhale 1 puff into the lungs 2 times daily as needed (PRN)     furosemide (LASIX) 20 MG tablet Take 1 tablet (20 mg) by mouth daily as needed (fluid retention)     isosorbide mononitrate (IMDUR) 30 MG 24 hr tablet Take 1 tablet (30 mg) by mouth 2 times daily (Taking 30 mg tablets BID from previous Rx until complete)     isosorbide mononitrate (IMDUR) 30 MG 24 hr tablet Take 30 mg by mouth 2 times daily Switch to 60mg tablet once daily when supply gone     isosorbide mononitrate (IMDUR) 60 MG 24 hr tablet Take 60 mg by mouth daily Start taking when 30mg tablets gone     latanoprost (XALATAN) 0.005 % ophthalmic solution Place 1 drop into both eyes At Bedtime     Lidocaine (LIDOCARE) 4 % Patch Place 1 patch onto the skin daily as needed for moderate pain To prevent lidocaine toxicity, patient should be patch free for 12 hrs daily.     metoprolol succinate ER (TOPROL XL) 25 MG 24 hr tablet Take 0.5 tablets (12.5 mg) by mouth daily     Multiple Vitamins-Iron (ONE DAILY MULTIVITAMIN/IRON) TABS Take 1 tablet by mouth daily     mycophenolate (GENERIC EQUIVALENT) 250 MG capsule Take 3 capsules (750 mg) by mouth 2 times daily     nitroGLYcerin (NITROSTAT) 0.4 MG sublingual tablet Place 1 tablet (0.4 mg) under the tongue every 5 minutes as needed for chest pain     Omega-3 Fatty Acids (OMEGA 3 PO) Take 1 capsule by mouth daily Dose unknown     pantoprazole (PROTONIX) 40 MG EC tablet Take 1 tablet (40 mg) by mouth daily     polyethylene glycol (MIRALAX) 17 g packet Take 17 g by mouth daily as needed      predniSONE (DELTASONE) 5 MG tablet Take 1 tablet (5 mg) by mouth daily     rosuvastatin (CRESTOR) 20 MG tablet TAKE 1 TABLET(20 MG) BY MOUTH DAILY     tacrolimus (PROTOPIC) 0.1 % external ointment Apply topically 2 times daily as needed     No current facility-administered medications for this  "visit.     ROS:  4 point ROS including Respiratory, CV, GI and , other than that noted in the HPI,  is negative    Vitals:  /79   Pulse 73   Temp 97.8  F (36.6  C)   Resp 16   Ht 1.727 m (5' 8\")   Wt 82.1 kg (180 lb 14.4 oz)   SpO2 95%   BMI 27.51 kg/m    Exam:  GENERAL APPEARANCE:  in no distress, appears healthy  ENT:  Mouth and posterior oropharynx normal, moist mucous membranes, normal hearing acuity  RESP:  lungs clear to auscultation , no respiratory distress  CV:  regular rate and rhythm, no murmur, rub, or gallop, no edema  ABDOMEN:  bowel sounds normal  M/S:   Gait and station abnormal unsteady gait  SKIN:  Inspection of skin and subcutaneous tissue baseline  NEURO:   Examination of sensation by touch normal  PSYCH:  affect and mood normal    Lab/Diagnostic Data:  Recent labs in [facility] reviewed by me today.     ASSESSMENT/PLAN:  (M87.051,  M87.052) Avascular necrosis of bones of both hips (H)  (primary encounter diagnosis)  (T84.018S,  Z96.649) Failed total hip arthroplasty, sequela  (M25.551) Hip pain, right  (M62.81) Generalized muscle weakness  Comment: post R hip revision 6/19, afebrile, incision no discharge, pain 6/10 with movement  Plan:   -PT/OT eval and treat  -CBC, BMP on 7/3  -clarify imdur 60mg/day  -change APAP to TID scheduled  -ASA81 BID for DVT prophylaxis x30 days  -continue oxycodone 5-10mg Q4h PRN, lidocaine patch  -follow up Dr.Teynor kitchen on 7/10      (N18.6) End stage renal disease (H)  (Z94.0) Kidney replaced by transplant  Comment: Xplant in 1993, reports creat +-1.0  Plan:   -continue mycophenolate, prednisone  -follow up nephrology PRN  -periodic BMP's    (I10) Hypertension, unspecified type  Comment: SBP's in the 110-120 range  Plan: continue lasix, metoprolol  -staff to check VS daily      (Z71.89) ACP (advance care planning)  Comment: code confirmed, Full  Plan: [place] ADVANCE CARE PLANNING FIRST 30 MINS          I spent 16 minutes F2F with patient in addition to " todays visit completing a POLST form.        Electronically signed by: RONALDO Izaguirre CNP        Sincerely,        RONALDO Izaguirre CNP

## 2023-06-28 NOTE — TELEPHONE ENCOUNTER
Prior Authorization Approval    Medication: PULMICORT FLEXHALER 90 MCG/ACT IN AEPB  Authorization Effective Date: 5/27/2023  Authorization Expiration Date: 6/25/2024  Approved Dose/Quantity:   Reference #:     Insurance Company: [employer] - Phone [phone] Fax [phone]  Expected CoPay:       CoPay Card Available:      Financial Assistance Needed:   Which Pharmacy is filling the prescription: [facility] - [place], [place] - [address] AT [address]  Pharmacy Notified: Yes  Patient Notified: Yes **Instructed pharmacy to notify patient when script is ready to /ship.**    Received approval info verbally from insurance plan.

## 2023-06-29 ENCOUNTER — DOCUMENTATION ONLY (OUTPATIENT)
Dept: GERIATRICS | Facility: CLINIC | Age: 63
End: 2023-06-29

## 2023-06-29 DIAGNOSIS — M25.552 BILATERAL HIP PAIN: Primary | ICD-10-CM

## 2023-06-29 DIAGNOSIS — M25.551 BILATERAL HIP PAIN: Primary | ICD-10-CM

## 2023-06-29 RX ORDER — OXYCODONE HYDROCHLORIDE 5 MG/1
5-10 TABLET ORAL EVERY 4 HOURS PRN
COMMUNITY
Start: 2023-06-29 | End: 2023-06-29

## 2023-06-29 RX ORDER — OXYCODONE HYDROCHLORIDE 5 MG/1
5-10 TABLET ORAL EVERY 4 HOURS PRN
Qty: 60 TABLET | Refills: 0 | Status: SHIPPED | OUTPATIENT
Start: 2023-06-29 | End: 2023-08-14

## 2023-06-30 ENCOUNTER — TRANSITIONAL CARE UNIT VISIT (OUTPATIENT)
Dept: GERIATRICS | Facility: CLINIC | Age: 63
End: 2023-06-30
Payer: COMMERCIAL

## 2023-06-30 VITALS
DIASTOLIC BLOOD PRESSURE: 71 MMHG | WEIGHT: 176.5 LBS | BODY MASS INDEX: 26.75 KG/M2 | HEART RATE: 66 BPM | OXYGEN SATURATION: 96 % | SYSTOLIC BLOOD PRESSURE: 108 MMHG | RESPIRATION RATE: 18 BRPM | TEMPERATURE: 97.6 F | HEIGHT: 68 IN

## 2023-06-30 DIAGNOSIS — M25.551 HIP PAIN, RIGHT: ICD-10-CM

## 2023-06-30 DIAGNOSIS — M87.051 AVASCULAR NECROSIS OF BONES OF BOTH HIPS (H): Primary | ICD-10-CM

## 2023-06-30 DIAGNOSIS — Z96.649 FAILED TOTAL HIP ARTHROPLASTY, SEQUELA: ICD-10-CM

## 2023-06-30 DIAGNOSIS — Z94.0 KIDNEY TRANSPLANTED: Primary | ICD-10-CM

## 2023-06-30 DIAGNOSIS — T84.018S FAILED TOTAL HIP ARTHROPLASTY, SEQUELA: ICD-10-CM

## 2023-06-30 DIAGNOSIS — Z96.641 S/P TOTAL RIGHT HIP ARTHROPLASTY: ICD-10-CM

## 2023-06-30 DIAGNOSIS — M87.052 AVASCULAR NECROSIS OF BONES OF BOTH HIPS (H): Primary | ICD-10-CM

## 2023-06-30 PROCEDURE — 99309 SBSQ NF CARE MODERATE MDM 30: CPT | Performed by: NURSE PRACTITIONER

## 2023-06-30 RX ORDER — MYCOPHENOLATE MOFETIL 250 MG/1
750 CAPSULE ORAL 2 TIMES DAILY
Qty: 540 CAPSULE | Refills: 3 | Status: SHIPPED | OUTPATIENT
Start: 2023-06-30 | End: 2024-07-05

## 2023-06-30 NOTE — LETTER
"    6/30/2023        RE: Jerad Ross  [address] [place] [address]        [facility]    Chief Complaint   Patient presents with     RECHECK     HPI:  Jerad Ross is a 63 year old  (1960), who is being seen today for an episodic care visit at: [facility] AT [facility] ([facility]) [[id number]]. Today's concern is:   1. Avascular necrosis of bones of both hips (H)    2. S/P total right hip arthroplasty    3. Failed total hip arthroplasty, sequela    4. Hip pain, right      Patient seen for follow up, bilateral hip issues with necrosis of bones, now had 2nd R hip revision, still having pain and declines reduction of oxycodone, teaching done on weaning after discharge as last time had side effects when stopping narcotics, wound vac removed, 38 intact staples with approximated incision, mild edema, overall appears healthy.    Allergies, and PMH/PSH reviewed in [facility] today.  REVIEW OF SYSTEMS:  4 point ROS including Respiratory, CV, GI and , other than that noted in the HPI,  is negative    Objective:   /71   Pulse 66   Temp 97.6  F (36.4  C)   Resp 18   Ht 1.727 m (5' 8\")   Wt 80.1 kg (176 lb 8 oz)   SpO2 96%   BMI 26.84 kg/m    GENERAL APPEARANCE:  in no distress, appears healthy  ENT:  Mouth and posterior oropharynx normal, moist mucous membranes  RESP:  lungs clear to auscultation , no respiratory distress  CV:  peripheral edema mild+ in R lower legs, rate-normal  ABDOMEN:  bowel sounds normal  M/S:   Gait and station abnormal transfer assist  SKIN:  Inspection of skin and subcutaneous tissue baseline  NEURO:   Examination of sensation by touch normal  PSYCH:  affect and mood normal    Recent labs in [facility] reviewed by me today.     Assessment/Plan:  (M87.051,  M87.052) Avascular necrosis of bones of both hips (H)  (primary encounter diagnosis)  (Z96.641) S/P total right hip arthroplasty  (T84.018S,  Z96.649) Failed total hip arthroplasty, sequela  (M25.551) Hip pain, " right  Comment: post 2nd revision R hip, incision approximated, pain 5/10, afebrile  Plan:   -PT/OT working with  -continue oxycodone 5-10mg Q4h PRN, educated on weaning process after discharge  -wound vac removed; discontinue  -daily cleanse incision, cover border dressing  -CBC, BMP on 7/3  -to follow up ortho 7/10  -potential discharge next week to home    MED REC REQUIRED  Post Medication Reconciliation Status: medication reconcilation previously completed during another office visit        Electronically signed by: RONALDO Izaguirre CNP           Sincerely,        RONALDO Izaguirre CNP

## 2023-06-30 NOTE — PROGRESS NOTES
"[facility] GERIATRICS    Chief Complaint   Patient presents with     RECHECK     HPI:  Jerad Ross is a 63 year old  (1960), who is being seen today for an episodic care visit at: [facility] AT [facility] ([facility]) [[id number]]. Today's concern is:   1. Avascular necrosis of bones of both hips (H)    2. S/P total right hip arthroplasty    3. Failed total hip arthroplasty, sequela    4. Hip pain, right      Patient seen for follow up, bilateral hip issues with necrosis of bones, now had 2nd R hip revision, still having pain and declines reduction of oxycodone, teaching done on weaning after discharge as last time had side effects when stopping narcotics, wound vac removed, 38 intact staples with approximated incision, mild edema, overall appears healthy.    Allergies, and PMH/PSH reviewed in [facility] today.  REVIEW OF SYSTEMS:  4 point ROS including Respiratory, CV, GI and , other than that noted in the HPI,  is negative    Objective:   /71   Pulse 66   Temp 97.6  F (36.4  C)   Resp 18   Ht 1.727 m (5' 8\")   Wt 80.1 kg (176 lb 8 oz)   SpO2 96%   BMI 26.84 kg/m    GENERAL APPEARANCE:  in no distress, appears healthy  ENT:  Mouth and posterior oropharynx normal, moist mucous membranes  RESP:  lungs clear to auscultation , no respiratory distress  CV:  peripheral edema mild+ in R lower legs, rate-normal  ABDOMEN:  bowel sounds normal  M/S:   Gait and station abnormal transfer assist  SKIN:  Inspection of skin and subcutaneous tissue baseline  NEURO:   Examination of sensation by touch normal  PSYCH:  affect and mood normal    Recent labs in [facility] reviewed by me today.     Assessment/Plan:  (M87.051,  M87.052) Avascular necrosis of bones of both hips (H)  (primary encounter diagnosis)  (Z96.641) S/P total right hip arthroplasty  (T84.018S,  Z96.649) Failed total hip arthroplasty, sequela  (M25.551) Hip pain, right  Comment: post 2nd revision R hip, incision approximated, pain 5/10, afebrile  Plan:   -PT/OT " working with  -continue oxycodone 5-10mg Q4h PRN, educated on weaning process after discharge  -wound vac removed; discontinue  -daily cleanse incision, cover border dressing  -CBC, BMP on 7/3  -to follow up ortho 7/10  -potential discharge next week to home    MED REC REQUIRED  Post Medication Reconciliation Status: medication reconcilation previously completed during another office visit        Electronically signed by: RONALDO Izaguirre CNP

## 2023-06-30 NOTE — TELEPHONE ENCOUNTER
[facility] calling about a refill on mycophenolate (GENERIC EQUIVALENT) 250 MG capsule & send to [facility], ASAP

## 2023-07-03 ENCOUNTER — LAB REQUISITION (OUTPATIENT)
Dept: LAB | Facility: CLINIC | Age: 63
End: 2023-07-03
Payer: COMMERCIAL

## 2023-07-03 DIAGNOSIS — D64.9 ANEMIA, UNSPECIFIED: ICD-10-CM

## 2023-07-03 LAB
ANION GAP SERPL CALCULATED.3IONS-SCNC: 13 MMOL/L (ref 7–15)
BASOPHILS # BLD AUTO: 0 10E3/UL (ref 0–0.2)
BASOPHILS NFR BLD AUTO: 1 %
BUN SERPL-MCNC: 9.7 MG/DL (ref 8–23)
CALCIUM SERPL-MCNC: 9.1 MG/DL (ref 8.8–10.2)
CHLORIDE SERPL-SCNC: 101 MMOL/L (ref 98–107)
CREAT SERPL-MCNC: 0.64 MG/DL (ref 0.67–1.17)
DEPRECATED HCO3 PLAS-SCNC: 25 MMOL/L (ref 22–29)
EOSINOPHIL # BLD AUTO: 0.3 10E3/UL (ref 0–0.7)
EOSINOPHIL NFR BLD AUTO: 4 %
ERYTHROCYTE [DISTWIDTH] IN BLOOD BY AUTOMATED COUNT: 17.2 % (ref 10–15)
GFR SERPL CREATININE-BSD FRML MDRD: >90 ML/MIN/1.73M2
GLUCOSE SERPL-MCNC: 75 MG/DL (ref 70–99)
HCT VFR BLD AUTO: 33.6 % (ref 40–53)
HGB BLD-MCNC: 9.9 G/DL (ref 13.3–17.7)
IMM GRANULOCYTES # BLD: 0 10E3/UL
IMM GRANULOCYTES NFR BLD: 1 %
LYMPHOCYTES # BLD AUTO: 1.9 10E3/UL (ref 0.8–5.3)
LYMPHOCYTES NFR BLD AUTO: 27 %
MCH RBC QN AUTO: 28.6 PG (ref 26.5–33)
MCHC RBC AUTO-ENTMCNC: 29.5 G/DL (ref 31.5–36.5)
MCV RBC AUTO: 97 FL (ref 78–100)
MONOCYTES # BLD AUTO: 0.6 10E3/UL (ref 0–1.3)
MONOCYTES NFR BLD AUTO: 9 %
NEUTROPHILS # BLD AUTO: 4.2 10E3/UL (ref 1.6–8.3)
NEUTROPHILS NFR BLD AUTO: 58 %
NRBC # BLD AUTO: 0 10E3/UL
NRBC BLD AUTO-RTO: 0 /100
PLAT MORPH BLD: ABNORMAL
PLATELET # BLD AUTO: 610 10E3/UL (ref 150–450)
POLYCHROMASIA BLD QL SMEAR: SLIGHT
POTASSIUM SERPL-SCNC: 4.3 MMOL/L (ref 3.4–5.3)
RBC # BLD AUTO: 3.46 10E6/UL (ref 4.4–5.9)
RBC MORPH BLD: ABNORMAL
SODIUM SERPL-SCNC: 139 MMOL/L (ref 136–145)
WBC # BLD AUTO: 7.1 10E3/UL (ref 4–11)

## 2023-07-03 PROCEDURE — 85025 COMPLETE CBC W/AUTO DIFF WBC: CPT | Mod: ORL | Performed by: FAMILY MEDICINE

## 2023-07-03 PROCEDURE — 80048 BASIC METABOLIC PNL TOTAL CA: CPT | Mod: ORL | Performed by: FAMILY MEDICINE

## 2023-07-03 NOTE — DISCHARGE SUMMARY
Admit Date: 2023  Discharge Date: 2023    HOSPITAL COURSE:  The patient had a revision total hip arthroplasty, right side.  He has got multiple medical comorbidities including kidney transplant, diabetes.  This hip has been in there for probably 25-30 years.    He went through surgery fine.  Blood loss minimal.  His hemoglobin did drop down to 8ish, stable at that level.  He did have a Hemovac placed on the wound, it lasts usually 2 weeks.  He is going to go to rehab center because he lives by himself.  We did a cemented long stem component.  Otherwise, he will resume all prehospital care plans, diets, meds, etc.  It took a while to find a nursing facility that would take his special needs.  Cultures at the time of discharge were negative, although we did do a preoperative culture, which also was negative.    Because of the cup that was in there for many, many years, close to 30+ years, we elected to cement a cup in a cup.  It was very, very stable, but advised the patient he is going to be extra cautious because in a revision, when we skeletonize everything it is far riskier.    He will resume all prehospital care plans, diets, meds, etc.     DIAGNOSIS:  Failed arthroplasty, right knee, due to complicating factor,transplant patient, kidney transplant,  diabetes.    CO-DIAGNOSIS:  Acute blood loss anemia.    So, the patient was discharged to rehab.  I anticipate he is going to do well.  Pain management has been a slight issue, but we will get him through that.  See him back in the office between 2-1/2 to 3 weeks postop.    Juan Mcdonough MD        D: 2023   T: 2023   MT: [email or web address]    Name:     MANSI LIRA  MRN:      [id number]        Account:      [id number]   :      1960           Service Date: 2023                                  Discharge Date: 2023     Document: [id number]

## 2023-07-05 ENCOUNTER — DISCHARGE SUMMARY NURSING HOME (OUTPATIENT)
Dept: GERIATRICS | Facility: CLINIC | Age: 63
End: 2023-07-05
Payer: COMMERCIAL

## 2023-07-05 VITALS
OXYGEN SATURATION: 96 % | DIASTOLIC BLOOD PRESSURE: 83 MMHG | TEMPERATURE: 97.3 F | SYSTOLIC BLOOD PRESSURE: 131 MMHG | WEIGHT: 173.2 LBS | BODY MASS INDEX: 26.25 KG/M2 | HEIGHT: 68 IN | RESPIRATION RATE: 19 BRPM | HEART RATE: 69 BPM

## 2023-07-05 DIAGNOSIS — M25.551 HIP PAIN, RIGHT: ICD-10-CM

## 2023-07-05 DIAGNOSIS — N18.6 END STAGE RENAL DISEASE (H): ICD-10-CM

## 2023-07-05 DIAGNOSIS — M87.052 AVASCULAR NECROSIS OF BONES OF BOTH HIPS (H): Primary | ICD-10-CM

## 2023-07-05 DIAGNOSIS — Z96.649 FAILED TOTAL HIP ARTHROPLASTY, SEQUELA: ICD-10-CM

## 2023-07-05 DIAGNOSIS — I10 HYPERTENSION, UNSPECIFIED TYPE: ICD-10-CM

## 2023-07-05 DIAGNOSIS — T84.018S FAILED TOTAL HIP ARTHROPLASTY, SEQUELA: ICD-10-CM

## 2023-07-05 DIAGNOSIS — M62.81 GENERALIZED MUSCLE WEAKNESS: ICD-10-CM

## 2023-07-05 DIAGNOSIS — Z94.0 KIDNEY REPLACED BY TRANSPLANT: ICD-10-CM

## 2023-07-05 DIAGNOSIS — M87.051 AVASCULAR NECROSIS OF BONES OF BOTH HIPS (H): Primary | ICD-10-CM

## 2023-07-05 PROCEDURE — 99316 NF DSCHRG MGMT 30 MIN+: CPT | Performed by: NURSE PRACTITIONER

## 2023-07-05 NOTE — PROGRESS NOTES
[facility] GERIATRICS DISCHARGE SUMMARY  PATIENT'S NAME: Jerad Ross  YOB: 1960  MEDICAL RECORD NUMBER:  [id number]  Place of Service where encounter took place:  [facility]  AT [facility] ([facility]) [[id number]]    PRIMARY CARE PROVIDER AND CLINIC RESPONSIBLE AFTER TRANSFER:   Aston Perry MD, [address] [facility] [address]    [employer] [facility] [employer] Provider     Transferring providers: RONALDO Dawson CNP; Mykel Arriaga MD  Recent Hospitalization/ED:  [facility] stay 06/19/2023 to 06/23/2023  Date of SNF Admission: 06/23/2023  Date of SNF (anticipated) Discharge: 07/14/2023  Discharged to: previous independent home  Cognitive Scores: NA  Physical Function: Pivot transfers  DME: Walker    CODE STATUS/ADVANCE DIRECTIVES DISCUSSION:  Prior   ALLERGIES: Penicillins, Keflex [cephalexin hcl], Sulfa antibiotics, and Tetracycline    NURSING FACILITY COURSE   Medication Changes/Rationale:     See notes    Summary of nursing facility stay:      Avascular necrosis of bones of both hips (H)  Failed total hip arthroplasty, sequela  Hip pain, right  Generalized muscle weakness  End stage renal disease (H)  Kidney replaced by transplant  Hypertension, unspecified type     (M87.051,  M87.052) Avascular necrosis of bones of both hips (H)  (primary encounter diagnosis)  (T84.018S,  Z96.649) Failed total hip arthroplasty, sequela  (M25.551) Hip pain, right  (M62.81) Generalized muscle weakness  Comment: post R hip revision 6/19 (second revision) afebrile, incision no discharge, pain 6/10 with movement  Plan:   -PT/OT eval and treat  -CBC, BMP on 7/3  -clarify imdur 60mg/day  -wound vac DC'd, 38 staples  -change APAP to TID scheduled  -ASA81 BID for DVT prophylaxis x30 days  -continue oxycodone 5-10mg Q4h PRN, lidocaine patch  -follow up Dr.Teynor kitchen on 7/10        (N18.6) End stage renal disease (H)  (Z94.0) Kidney replaced by transplant  Comment: Xplant in 1993, reports  creat +-1.0  Plan:   -continue mycophenolate, prednisone  -follow up nephrology PRN  -periodic BMP's     (I10) Hypertension, unspecified type  Comment: SBP's in the 110-120 range  Plan: continue lasix, metoprolol  -hold metoprolol SBP <110, HR <60  -print BP's, fax to  cardiology  -staff to check VS daily    Discharge Medications:  MED REC REQUIRED  Post Medication Reconciliation Status: medication reconcilation previously completed during another office visit       Current Outpatient Medications   Medication Sig Dispense Refill     acetaminophen (TYLENOL) 500 MG tablet Take 2 tablets (1,000 mg) by mouth 3 times daily 1 tablet 0     albuterol (PROAIR HFA) 108 (90 Base) MCG/ACT inhaler Inhale 2 puffs into the lungs every 6 hours as needed for shortness of breath / dyspnea or wheezing 1 g 11     aspirin 81 MG EC tablet Resume usual dose of aspirin after finishing increased dose prescribed after surgery       aspirin 81 MG EC tablet Take 1 tablet (81 mg) by mouth 2 times daily 60 tablet 0     budesonide (PULMICORT FLEXHALER) 90 MCG/ACT inhaler Inhale 2 puffs into the lungs 2 times daily 3 each 2     calcium citrate-vitamin D (CITRACAL) 315-200 MG-UNIT TABS Take 1 tablet by mouth daily.       entecavir (BARACLUDE) 0.5 MG tablet Take 1 tablet (0.5 mg) by mouth daily 90 tablet 1     FLUoxetine (PROZAC) 40 MG capsule Take 1 capsule (40 mg) by mouth daily 90 capsule 2     fluticasone-salmeterol (ADVAIR) 250-50 MCG/ACT inhaler Inhale 1 puff into the lungs 2 times daily as needed (PRN) 180 each 3     furosemide (LASIX) 20 MG tablet Take 1 tablet (20 mg) by mouth daily as needed (fluid retention) 90 tablet 1     isosorbide mononitrate (IMDUR) 60 MG 24 hr tablet Take 60 mg by mouth daily Start taking when 30mg tablets gone       latanoprost (XALATAN) 0.005 % ophthalmic solution Place 1 drop into both eyes At Bedtime 2.5 mL 11     Lidocaine (LIDOCARE) 4 % Patch Place 1 patch onto the skin daily as needed for moderate  pain To prevent lidocaine toxicity, patient should be patch free for 12 hrs daily.       metoprolol succinate ER (TOPROL XL) 25 MG 24 hr tablet Take 0.5 tablets (12.5 mg) by mouth daily 30 tablet 1     Multiple Vitamins-Iron (ONE DAILY MULTIVITAMIN/IRON) TABS Take 1 tablet by mouth daily       mycophenolate (GENERIC EQUIVALENT) 250 MG capsule Take 3 capsules (750 mg) by mouth 2 times daily 540 capsule 3     nitroGLYcerin (NITROSTAT) 0.4 MG sublingual tablet Place 1 tablet (0.4 mg) under the tongue every 5 minutes as needed for chest pain 20 tablet 3     Omega-3 Fatty Acids (OMEGA 3 PO) Take 1 capsule by mouth daily Dose unknown       oxyCODONE (ROXICODONE) 5 MG tablet Take 1-2 tablets (5-10 mg) by mouth every 4 hours as needed for pain 60 tablet 0     pantoprazole (PROTONIX) 40 MG EC tablet Take 1 tablet (40 mg) by mouth daily 90 tablet 3     polyethylene glycol (MIRALAX) 17 g packet Take 17 g by mouth daily as needed        predniSONE (DELTASONE) 5 MG tablet Take 1 tablet (5 mg) by mouth daily 90 tablet 3     rosuvastatin (CRESTOR) 20 MG tablet TAKE 1 TABLET(20 MG) BY MOUTH DAILY 90 tablet 3     tacrolimus (PROTOPIC) 0.1 % external ointment Apply topically 2 times daily as needed            Controlled medications:   week supply oxycodone being sent home     Past Medical History:   Past Medical History:   Diagnosis Date     Acute midline low back pain without sciatica      AION (acute ischemic optic neuropathy)      Anemia in chronic renal disease      Arthritis      Avascular necrosis of bones of both hips (H)     s/p bilateral hip replacements     Avascular necrosis of bones of both hips (H)     s/p bilateral hip replacements     Basal cell carcinoma      Chronic hepatitis B (H)      Clostridium difficile colitis      COPD (chronic obstructive pulmonary disease) (H)      Coronary artery disease involving native coronary artery of native heart without angina pectoris 06/17/2014    Coronary angiogram 6/17/14: Severe  "distal 3-vessel disease involving small vessels, not amenable to PCI or CABG.     CRP elevated      Depression      Depressive disorder      Diverticulosis      Dyslipidemia      Elevated C-reactive protein (CRP)      FSGS (focal segmental glomerulosclerosis)      FSGS (focal segmental glomerulosclerosis)      Gastric ulcer with hemorrhage 02/12/2012     Gastric ulcer with hemorrhage 02/12/2012     GERD (gastroesophageal reflux disease)      Glaucoma     OHTN     Glaucoma      Hemorrhoids      Hemorrhoids      History of blood transfusion      HTN (hypertension)      Hyperlipidemia      Hypertension secondary to other renal disorders      Hypogonadism in male      Immunosuppressed status (H)      Kidney replaced by transplant     focal glomerulosclerosis      Kidney transplanted     focal glomerulosclerosis      NSTEMI (non-ST elevated myocardial infarction) (H) 06/17/2014     NSTEMI (non-ST elevated myocardial infarction) (H) 06/17/2014     JULIANE (obstructive sleep apnea)     Doesn't use CPAP     Paracentral scotoma     LE     Secondary renal hyperparathyroidism (H)      Secondary renal hyperparathyroidism (H)      Septic bursitis      Squamous cell carcinoma      Uncomplicated asthma      Physical Exam:   Vitals: /83   Pulse 69   Temp 97.3  F (36.3  C)   Resp 19   Ht 1.727 m (5' 8\")   Wt 78.6 kg (173 lb 3.2 oz)   SpO2 96%   BMI 26.33 kg/m    BMI: Body mass index is 26.33 kg/m .  GENERAL APPEARANCE:  in no distress, appears healthy  ENT:  Mouth and posterior oropharynx normal, moist mucous membranes  RESP:  lungs clear to auscultation , no respiratory distress  CV:  regular rate and rhythm, no murmur, rub, or gallop, no edema  ABDOMEN:  bowel sounds normal  M/S:   Gait and station abnormal unsteady gait  SKIN:  Inspection of skin and subcutaneous tissue baseline  NEURO:   Examination of sensation by touch normal  PSYCH:  affect and mood normal     SNF labs: Labs done in SNF are in [place] EPIC. Please " refer to them using EPIC/Care Everywhere.    DISCHARGE PLAN:    Follow up labs: No labs orders/due    Medical Follow Up:      Follow up with primary care provider in 1 weeks    [facility] scheduled appointments:   NA    Discharge Services: Home Care:  Occupational Therapy, Physical Therapy, Registered Nurse and Home Health Aide    Discharge Instructions Verbalized to Patient at Discharge:     None    TOTAL DISCHARGE TIME:   Greater than 30 minutes  Electronically signed by:  RONALDO Izaguirre CNP         Documentation of Face-to-Face and Certification for Home Health Services     Patient: Jerad Ross   YOB: 1960  MR Number: [id number]  Today's Date: 7/5/2023    I certify that patient: Jerad Ross is under my care and that I, or a nurse practitioner or physician's assistant working with me, had a face-to-face encounter that meets the physician face-to-face encounter requirements with this patient on: 7/5/2023.    This encounter with the patient was in whole, or in part, for the following medical condition, which is the primary reason for home health care:   1. Avascular necrosis of bones of both hips (H)    2. Failed total hip arthroplasty, sequela    3. Hip pain, right    4. Generalized muscle weakness    5. End stage renal disease (H)    6. Kidney replaced by transplant    7. Hypertension, unspecified type          I certify that, based on my findings, the following services are medically necessary home health services: Nursing, Occupational Therapy, Physical Therapy and HHA.    My clinical findings support the need for the above services because: Nurse is needed: To provide caregiver training to assist with: pain, incision cares.., Occupational Therapy Services are needed to assess and treat cognitive ability and address ADL safety due to impairment in transfers., Physical Therapy Services are needed to assess and treat the following functional impairments: gait instability.  and [facility] for bathing.    Further, I certify that my clinical findings support that this patient is homebound (i.e. absences from home require considerable and taxing effort and are for medical reasons or Christianity services or infrequently or of short duration when for other reasons) because: Requires assistance of another person or specialized equipment to access medical services because patient: Is unable to operate assistive equipment on their own...    Based on the above findings. I certify that this patient is confined to the home and needs intermittent skilled nursing care, physical therapy and/or speech therapy.  The patient is under my care, and I have initiated the establishment of the plan of care.  This patient will be followed by a physician who will periodically review the plan of care.  Physician/Provider to provide follow up care: Aston Perry    Responsible Medicare certified PECOS Physician: Nolan Santoyo NP  Electronic Physician Signature  Date: 7/5/2023

## 2023-07-05 NOTE — LETTER
7/5/2023        RE: Jerad Ross  [address] [place] [address]        [facility] GERIATRICS DISCHARGE SUMMARY  PATIENT'S NAME: Jerad Ross  YOB: 1960  MEDICAL RECORD NUMBER:  [id number]  Place of Service where encounter took place:  [facility]  AT [facility] ([place]) [[id number]]    PRIMARY CARE PROVIDER AND CLINIC RESPONSIBLE AFTER TRANSFER:   Aston Perry MD, [address] [facility] [address]    [facility] Provider     Transferring providers: RONALDO Dawson CNP; Mykel Arriaga MD  Recent Hospitalization/ED:  [facility] stay 06/19/2023 to 06/23/2023  Date of SNF Admission: 06/23/2023  Date of SNF (anticipated) Discharge: 07/14/2023  Discharged to: previous independent home  Cognitive Scores: NA  Physical Function: Pivot transfers  DME: [facility]    CODE STATUS/ADVANCE DIRECTIVES DISCUSSION:  Prior   ALLERGIES: Penicillins, Keflex [cephalexin hcl], Sulfa antibiotics, and Tetracycline    NURSING FACILITY COURSE   Medication Changes/Rationale:     See notes    Summary of nursing facility stay:      Avascular necrosis of bones of both hips (H)  Failed total hip arthroplasty, sequela  Hip pain, right  Generalized muscle weakness  End stage renal disease (H)  Kidney replaced by transplant  Hypertension, unspecified type     (M87.051,  M87.052) Avascular necrosis of bones of both hips (H)  (primary encounter diagnosis)  (T84.018S,  Z96.649) Failed total hip arthroplasty, sequela  (M25.551) Hip pain, right  (M62.81) Generalized muscle weakness  Comment: post R hip revision 6/19 (second revision) afebrile, incision no discharge, pain 6/10 with movement  Plan:   -PT/OT eval and treat  -CBC, BMP on 7/3  -clarify imdur 60mg/day  -wound vac DC'd, 38 staples  -change APAP to TID scheduled  -ASA81 BID for DVT prophylaxis x30 days  -continue oxycodone 5-10mg Q4h PRN, lidocaine patch  -follow up Dr.Teynor kitchen on 7/10        (N18.6) End stage  renal disease (H)  (Z94.0) Kidney replaced by transplant  Comment: Xplant in 1993, reports creat +-1.0  Plan:   -continue mycophenolate, prednisone  -follow up nephrology PRN  -periodic BMP's     (I10) Hypertension, unspecified type  Comment: SBP's in the 110-120 range  Plan: continue lasix, metoprolol  -hold metoprolol SBP <110, HR <60  -print BP's, fax to  cardiology  -staff to check VS daily    Discharge Medications:  MED REC REQUIRED  Post Medication Reconciliation Status: medication reconcilation previously completed during another office visit       Current Outpatient Medications   Medication Sig Dispense Refill     acetaminophen (TYLENOL) 500 MG tablet Take 2 tablets (1,000 mg) by mouth 3 times daily 1 tablet 0     albuterol (PROAIR HFA) 108 (90 Base) MCG/ACT inhaler Inhale 2 puffs into the lungs every 6 hours as needed for shortness of breath / dyspnea or wheezing 1 g 11     aspirin 81 MG EC tablet Resume usual dose of aspirin after finishing increased dose prescribed after surgery       aspirin 81 MG EC tablet Take 1 tablet (81 mg) by mouth 2 times daily 60 tablet 0     budesonide (PULMICORT FLEXHALER) 90 MCG/ACT inhaler Inhale 2 puffs into the lungs 2 times daily 3 each 2     calcium citrate-vitamin D (CITRACAL) 315-200 MG-UNIT TABS Take 1 tablet by mouth daily.       entecavir (BARACLUDE) 0.5 MG tablet Take 1 tablet (0.5 mg) by mouth daily 90 tablet 1     FLUoxetine (PROZAC) 40 MG capsule Take 1 capsule (40 mg) by mouth daily 90 capsule 2     fluticasone-salmeterol (ADVAIR) 250-50 MCG/ACT inhaler Inhale 1 puff into the lungs 2 times daily as needed (PRN) 180 each 3     furosemide (LASIX) 20 MG tablet Take 1 tablet (20 mg) by mouth daily as needed (fluid retention) 90 tablet 1     isosorbide mononitrate (IMDUR) 60 MG 24 hr tablet Take 60 mg by mouth daily Start taking when 30mg tablets gone       latanoprost (XALATAN) 0.005 % ophthalmic solution Place 1 drop into both eyes At Bedtime 2.5 mL 11      Lidocaine (LIDOCARE) 4 % Patch Place 1 patch onto the skin daily as needed for moderate pain To prevent lidocaine toxicity, patient should be patch free for 12 hrs daily.       metoprolol succinate ER (TOPROL XL) 25 MG 24 hr tablet Take 0.5 tablets (12.5 mg) by mouth daily 30 tablet 1     Multiple Vitamins-Iron (ONE DAILY MULTIVITAMIN/IRON) TABS Take 1 tablet by mouth daily       mycophenolate (GENERIC EQUIVALENT) 250 MG capsule Take 3 capsules (750 mg) by mouth 2 times daily 540 capsule 3     nitroGLYcerin (NITROSTAT) 0.4 MG sublingual tablet Place 1 tablet (0.4 mg) under the tongue every 5 minutes as needed for chest pain 20 tablet 3     Omega-3 Fatty Acids (OMEGA 3 PO) Take 1 capsule by mouth daily Dose unknown       oxyCODONE (ROXICODONE) 5 MG tablet Take 1-2 tablets (5-10 mg) by mouth every 4 hours as needed for pain 60 tablet 0     pantoprazole (PROTONIX) 40 MG EC tablet Take 1 tablet (40 mg) by mouth daily 90 tablet 3     polyethylene glycol (MIRALAX) 17 g packet Take 17 g by mouth daily as needed        predniSONE (DELTASONE) 5 MG tablet Take 1 tablet (5 mg) by mouth daily 90 tablet 3     rosuvastatin (CRESTOR) 20 MG tablet TAKE 1 TABLET(20 MG) BY MOUTH DAILY 90 tablet 3     tacrolimus (PROTOPIC) 0.1 % external ointment Apply topically 2 times daily as needed            Controlled medications:   week supply oxycodone being sent home     Past Medical History:   Past Medical History:   Diagnosis Date     Acute midline low back pain without sciatica      AION (acute ischemic optic neuropathy)      Anemia in chronic renal disease      Arthritis      Avascular necrosis of bones of both hips (H)     s/p bilateral hip replacements     Avascular necrosis of bones of both hips (H)     s/p bilateral hip replacements     Basal cell carcinoma      Chronic hepatitis B (H)      Clostridium difficile colitis      COPD (chronic obstructive pulmonary disease) (H)      Coronary artery disease involving native coronary  "artery of native heart without angina pectoris 06/17/2014    Coronary angiogram 6/17/14: Severe distal 3-vessel disease involving small vessels, not amenable to PCI or CABG.     CRP elevated      Depression      Depressive disorder      Diverticulosis      Dyslipidemia      Elevated C-reactive protein (CRP)      FSGS (focal segmental glomerulosclerosis)      FSGS (focal segmental glomerulosclerosis)      Gastric ulcer with hemorrhage 02/12/2012     Gastric ulcer with hemorrhage 02/12/2012     GERD (gastroesophageal reflux disease)      Glaucoma     OHTN     Glaucoma      Hemorrhoids      Hemorrhoids      History of blood transfusion      HTN (hypertension)      Hyperlipidemia      Hypertension secondary to other renal disorders      Hypogonadism in male      Immunosuppressed status (H)      Kidney replaced by transplant     focal glomerulosclerosis      Kidney transplanted     focal glomerulosclerosis      NSTEMI (non-ST elevated myocardial infarction) (H) 06/17/2014     NSTEMI (non-ST elevated myocardial infarction) (H) 06/17/2014     JULIANE (obstructive sleep apnea)     Doesn't use CPAP     Paracentral scotoma     LE     Secondary renal hyperparathyroidism (H)      Secondary renal hyperparathyroidism (H)      Septic bursitis      Squamous cell carcinoma      Uncomplicated asthma      Physical Exam:   Vitals: /83   Pulse 69   Temp 97.3  F (36.3  C)   Resp 19   Ht 1.727 m (5' 8\")   Wt 78.6 kg (173 lb 3.2 oz)   SpO2 96%   BMI 26.33 kg/m    BMI: Body mass index is 26.33 kg/m .  GENERAL APPEARANCE:  in no distress, appears healthy  ENT:  Mouth and posterior oropharynx normal, moist mucous membranes  RESP:  lungs clear to auscultation , no respiratory distress  CV:  regular rate and rhythm, no murmur, rub, or gallop, no edema  ABDOMEN:  bowel sounds normal  M/S:   Gait and station abnormal unsteady gait  SKIN:  Inspection of skin and subcutaneous tissue baseline  NEURO:   Examination of sensation by touch " normal  PSYCH:  affect and mood normal     SNF labs: Labs done in SNF are in [facility] EPIC. Please refer to them using EPIC/Care Everywhere.    DISCHARGE PLAN:    Follow up labs: No labs orders/due    Medical Follow Up:      Follow up with primary care provider in 1 weeks    Current [facility] scheduled appointments:   NA    Discharge Services: Home Care:  Occupational Therapy, Physical Therapy, Registered Nurse and Home Health Aide    Discharge Instructions Verbalized to Patient at Discharge:     None    TOTAL DISCHARGE TIME:   Greater than 30 minutes  Electronically signed by:  RONALDO Izaguirre CNP         Documentation of Face-to-Face and Certification for Home Health Services     Patient: Jerad Ross   YOB: 1960  MR Number: [id number]  Today's Date: 7/5/2023    I certify that patient: Jerad Ross is under my care and that I, or a nurse practitioner or physician's assistant working with me, had a face-to-face encounter that meets the physician face-to-face encounter requirements with this patient on: 7/5/2023.    This encounter with the patient was in whole, or in part, for the following medical condition, which is the primary reason for home health care:   1. Avascular necrosis of bones of both hips (H)    2. Failed total hip arthroplasty, sequela    3. Hip pain, right    4. Generalized muscle weakness    5. End stage renal disease (H)    6. Kidney replaced by transplant    7. Hypertension, unspecified type          I certify that, based on my findings, the following services are medically necessary home health services: Nursing, Occupational Therapy, Physical Therapy and HHA.    My clinical findings support the need for the above services because: Nurse is needed: To provide caregiver training to assist with: pain, incision cares.., Occupational Therapy Services are needed to assess and treat cognitive ability and address ADL safety due to impairment in transfers., Physical  Therapy Services are needed to assess and treat the following functional impairments: gait instability. and HHA for bathing.    Further, I certify that my clinical findings support that this patient is homebound (i.e. absences from home require considerable and taxing effort and are for medical reasons or Christian services or infrequently or of short duration when for other reasons) because: Requires assistance of another person or specialized equipment to access medical services because patient: Is unable to operate assistive equipment on their own...    Based on the above findings. I certify that this patient is confined to the home and needs intermittent skilled nursing care, physical therapy and/or speech therapy.  The patient is under my care, and I have initiated the establishment of the plan of care.  This patient will be followed by a physician who will periodically review the plan of care.  Physician/Provider to provide follow up care: Aston Perry    Responsible Medicare certified PECOS Physician: Nolan Santoyo NP  Electronic Physician Signature  Date: 7/5/2023                  Sincerely,        RONALDO Izaguirre CNP

## 2023-07-10 ENCOUNTER — TRANSFERRED RECORDS (OUTPATIENT)
Dept: HEALTH INFORMATION MANAGEMENT | Facility: CLINIC | Age: 63
End: 2023-07-10

## 2023-07-17 ENCOUNTER — TELEPHONE (OUTPATIENT)
Dept: PSYCHIATRY | Facility: CLINIC | Age: 63
End: 2023-07-17

## 2023-07-17 ENCOUNTER — TELEPHONE (OUTPATIENT)
Dept: INTERNAL MEDICINE | Facility: CLINIC | Age: 63
End: 2023-07-17

## 2023-07-17 NOTE — TELEPHONE ENCOUNTER
"Writer followed up with patient regarding report of increased depression and \"not being in a good place.\"    Patient returned home 3 days ago from [facility], where he was recovering from a hip revision procedure, which has left his legs at different lengths.    Since returning home, his depression has increased and he is experiencing passive SI.  Jerad denies any active plan.  He states that he would be OK if he didn't wake up.  Jerad contracts for safety at this time.      He is currently prescribed oxycodone 5 mg.  He was sent home with 13 tablets.  He is concerned that he will experience withdrawal, which increases his anxiety.  Patient states that he was prescribed medication by the psychiatry clinic \"to help him deal with the withdrawal he felt after stopping Oxycodone in May - which he was not able to obtain from the pharmacy due to a mix up.  Last time he was prescribed oxycodone, he turned to alcohol as a coping mechanism.  Patient states that he does not have any alcohol at his home and does not plan to obtain any.    Patient scheduled to see Genia Fraser on 7/28.      Sent to Genia Fraser for review.    "

## 2023-07-17 NOTE — TELEPHONE ENCOUNTER
"[facility] Call Center    Phone Message    May a detailed message be left on voicemail: yes     Reason for Call: Other: Pt called stating he was \"in a bit of trouble\" and was hoping to find a sooner appt with Genia Fraser before current kiel ton 7/28/23. Pt stated they have been struggling more with depressive symptoms and some dependency on pain medications following recent surgery. Provider did not have any sooner appts available, and pt requested message be sent to nursing team for outreach regarding the symptoms.     Action Taken: Message routed to:  Other: Nursing pool    Travel Screening: Not Applicable                                                                        "

## 2023-07-17 NOTE — TELEPHONE ENCOUNTER
[facility] Call Center    Phone Message    May a detailed message be left on voicemail: yes     Reason for Call: Order(s): Home Care Orders: Skilled Nursing:  Neil from [facility] calling to request a delay in starting of care for skilled nursing, they are hoping to see him tomorrow 07/18/23 for beginning services.     Action Taken: Message routed to:  [facility] pcc    Travel Screening: Not Applicable

## 2023-07-18 ENCOUNTER — TELEPHONE (OUTPATIENT)
Dept: INTERNAL MEDICINE | Facility: CLINIC | Age: 63
End: 2023-07-18

## 2023-07-18 ENCOUNTER — MEDICAL CORRESPONDENCE (OUTPATIENT)
Dept: HEALTH INFORMATION MANAGEMENT | Facility: CLINIC | Age: 63
End: 2023-07-18

## 2023-07-18 NOTE — TELEPHONE ENCOUNTER
Roderick Cormier:  I replied to Jerad via [employer].  Let the team know if we can do anything else for this.. -- Cesar

## 2023-07-18 NOTE — TELEPHONE ENCOUNTER
Last saw Dr. Perry on 11/01/2022.  No future appt scheduled.       Called Neil and left a secure VM to [facility].   gave verbal orders per Dr. Perry for the okay for Order(s): Home Care Orders: Skilled Nursing:  Neil from [facility]  delay in starting of care for skilled nursing,starting 07/18/23      Umair Hawkins CMA ([facility]) at 8:45 AM on 7/18/2023

## 2023-07-18 NOTE — TELEPHONE ENCOUNTER
[facility] Call Center    Phone Message    May a detailed message be left on voicemail: yes     Reason for Call: Medication Question or concern regarding medication   Prescription Clarification  Name of Medication: oxycodone      What on the order needs clarification? Pt called and stated he recently had hip surgery and is having trouble getting off the oxycodone medication, wanting to discuss and get a recommendation for his care team        He has been given a total of 115 tablets of 5 mg oxycodone over the past 4 months.  He was given 60 tablets 3 weeks ago.  Although he would likely have minimal withdrawal from stopping cold turkey he might want to start acetaminophen 1000 mg 4 times a day for 3 to 4 days while he tapers the oxycodone to 1 a day for 3 days then a half for 2 days.  JL

## 2023-07-18 NOTE — TELEPHONE ENCOUNTER
Writer sent patient a [employer] message with Genia Fraser's recommendation that he contact his PCP or surgeon to discuss a plan for tapering off oxycodone and managing the anxiety related to doing so.  This was also recommended in early May when he contacted the clinic following his previous surgery.

## 2023-07-18 NOTE — TELEPHONE ENCOUNTER
[facility] Call Center    Phone Message    May a detailed message be left on voicemail: yes     Reason for Call: Order(s): Home Care Orders: Other: [facility] patient to Home Care,needs verbal orders for Skilled Nusring 1 time a week for 3 weeks and the Home Health Aid 1 time a week.    Action Taken: Other: PCC    Travel Screening: Not Applicable

## 2023-07-19 ENCOUNTER — DOCUMENTATION ONLY (OUTPATIENT)
Dept: INTERNAL MEDICINE | Facility: CLINIC | Age: 63
End: 2023-07-19

## 2023-07-19 NOTE — PROGRESS NOTES
Type of Form Received:     Form Received (Date) 7/19/23   Form Filled out Yes, 7/25/23   Placed in provider folder Yes

## 2023-07-19 NOTE — TELEPHONE ENCOUNTER
"Left a message regarding Dr. Trent recommendation on tapering off the oxycodone medication.  Will also send patient a [employer] message.     \"He has been given a total of 115 tablets of 5 mg oxycodone            over the past 4 months.  He was given 60 tablets 3 weeks ago.  Although he would likely have minimal withdrawal from stopping cold turkey he might want to start acetaminophen 1000 mg 4 times a day for 3 to 4 days while he tapers the oxycodone to 1 a day for 3 days then a half for 2 days.  JL\"           Radha Hill RN on 7/19/2023 at 4:57 PM                        "

## 2023-07-19 NOTE — TELEPHONE ENCOUNTER
The patient would like a call from the care team to discuss tapering off this medication please review and follow up thank you.

## 2023-07-20 ENCOUNTER — MEDICAL CORRESPONDENCE (OUTPATIENT)
Dept: HEALTH INFORMATION MANAGEMENT | Facility: CLINIC | Age: 63
End: 2023-07-20

## 2023-07-20 NOTE — TELEPHONE ENCOUNTER
Called [facility] and left a secure VM. Gave verbal orders per Dr. Perry for Order(s): Home Care Orders: Other: Openeed patient to Home Care,needs verbal orders for Skilled Nusring 1 time a week for 3 weeks and the Home Health Aid 1 time a week.        Umair Hawkins CMA ([facility]) at 9:26 AM on 7/20/2023

## 2023-07-25 ENCOUNTER — HOSPITAL ENCOUNTER (EMERGENCY)
Facility: CLINIC | Age: 63
Discharge: HOME | End: 2023-07-26
Attending: EMERGENCY MEDICINE | Admitting: EMERGENCY MEDICINE
Payer: COMMERCIAL

## 2023-07-25 ENCOUNTER — MEDICAL CORRESPONDENCE (OUTPATIENT)
Dept: HEALTH INFORMATION MANAGEMENT | Facility: CLINIC | Age: 63
End: 2023-07-25

## 2023-07-25 DIAGNOSIS — Z95.1 S/P CABG (CORONARY ARTERY BYPASS GRAFT): ICD-10-CM

## 2023-07-25 DIAGNOSIS — F41.9 ANXIETY: ICD-10-CM

## 2023-07-25 PROCEDURE — 99285 EMERGENCY DEPT VISIT HI MDM: CPT | Mod: 25 | Performed by: EMERGENCY MEDICINE

## 2023-07-25 PROCEDURE — 99284 EMERGENCY DEPT VISIT MOD MDM: CPT | Performed by: EMERGENCY MEDICINE

## 2023-07-25 ASSESSMENT — ACTIVITIES OF DAILY LIVING (ADL): ADLS_ACUITY_SCORE: 35

## 2023-07-26 VITALS
OXYGEN SATURATION: 99 % | HEIGHT: 67 IN | WEIGHT: 170 LBS | BODY MASS INDEX: 26.68 KG/M2 | SYSTOLIC BLOOD PRESSURE: 120 MMHG | HEART RATE: 94 BPM | TEMPERATURE: 98.2 F | DIASTOLIC BLOOD PRESSURE: 78 MMHG | RESPIRATION RATE: 18 BRPM

## 2023-07-26 PROBLEM — F41.1 GENERALIZED ANXIETY DISORDER: Chronic | Status: ACTIVE | Noted: 2023-07-26

## 2023-07-26 PROCEDURE — 90791 PSYCH DIAGNOSTIC EVALUATION: CPT

## 2023-07-26 PROCEDURE — 250N000013 HC RX MED GY IP 250 OP 250 PS 637: Performed by: EMERGENCY MEDICINE

## 2023-07-26 RX ORDER — METOPROLOL SUCCINATE 25 MG/1
12.5 TABLET, EXTENDED RELEASE ORAL DAILY
Qty: 15 TABLET | Refills: 0 | Status: SHIPPED | OUTPATIENT
Start: 2023-07-26 | End: 2023-12-08

## 2023-07-26 RX ORDER — HYDROXYZINE HYDROCHLORIDE 10 MG/1
10 TABLET, FILM COATED ORAL ONCE
Status: COMPLETED | OUTPATIENT
Start: 2023-07-26 | End: 2023-07-26

## 2023-07-26 RX ORDER — HYDROXYZINE HYDROCHLORIDE 10 MG/1
10 TABLET, FILM COATED ORAL EVERY 8 HOURS PRN
Qty: 10 TABLET | Refills: 0 | Status: SHIPPED | OUTPATIENT
Start: 2023-07-26 | End: 2023-07-28

## 2023-07-26 RX ADMIN — HYDROXYZINE HYDROCHLORIDE 10 MG: 10 TABLET ORAL at 03:10

## 2023-07-26 ASSESSMENT — ACTIVITIES OF DAILY LIVING (ADL)
ADLS_ACUITY_SCORE: 37
ADLS_ACUITY_SCORE: 37

## 2023-07-26 NOTE — ED TRIAGE NOTES
Pt reports he did not sleep last night due to anxiety; felt like he was unsettled and HR elevated. Had 3 shots of Vodka today to help him relax, and pt reports it helped. Pt is here because he is worried he will have anxiety again tonight and will not be able to sleep. Pt reports he feels anxious at the moment but not as much as earlier.     Triage Assessment     Row Name 07/25/23 2041       Triage Assessment (Adult)    Airway WDL WDL       Respiratory WDL    Respiratory WDL WDL       Skin Circulation/Temperature WDL    Skin Circulation/Temperature WDL WDL       Cardiac WDL    Cardiac WDL WDL       Peripheral/Neurovascular WDL    Peripheral Neurovascular WDL WDL       Cognitive/Neuro/Behavioral WDL    Cognitive/Neuro/Behavioral WDL WDL

## 2023-07-26 NOTE — ED NOTES
Pt received discharge paperwork, pt signed the discharge paperwork with no further questions for the RN of MD, vitals taken, pt ambulated out of the ED, pt stated they were able to get a ride to take them home, anxiety med given prior to discharge per MD orders

## 2023-07-26 NOTE — CONSULTS
Diagnostic Evaluation Consultation  Crisis Assessment    Patient Name: Jerad Ross  Age:  63 year old  Legal Sex: male  Gender Identity: male  Pronouns:   Race: White  Ethnicity: Not  or   Language: English      Patient was assessed: Virtual: Amv[employer]  Patient location: [facility] EMERGENCY DEPARTMENT                             [facility]    Referral Data and Chief Complaint  Jerad Ross presents to the ED by  self. Patient is presenting to the ED for the following concerns: Anxiety, Depression.   Factors that make the mental health crisis life threatening or complex are:  inability to sleep.      Informed Consent and Assessment Methods  Explained the crisis assessment process, including applicable information disclosures and limits to confidentiality, assessed understanding of the process, and obtained consent to proceed with the assessment.  Assessment methods included conducting a formal interview with patient, review of medical records, collaboration with medical staff, and obtaining relevant collateral information from family and community providers when available.  :       Patient response to interventions:    Coping skills were attempted to reduce the crisis:        History of the Crisis   Pt reports that over the past several days he has been experiencing increased anxeity that has made it difficult for him to sleep. Pt notes that he was prescribed oxycotin for 4 weeks follow hip surgery and after stopping this medication 5 days ago his anxiety has worsened. Pt notes that he has been using coping skills to help him but when he is not actively trying to distract himself he feels anxious. Pt denies suicidal ideation but notes feeling frustrated with his mental state. Pt came to the ED tonight as he felt he would not be able to sleep so he thought he could recieve support.    Brief Psychosocial History  Family:  , Children no  Support System:  Sibling(s), Other (specify)  (friends)  Employment Status:  retired  Source of Income:  salary/wages, social security  Financial Environmental Concerns:  No concerns identified  Current Hobbies:  arts/crafts, cooking/baking, games  Barriers in Personal Life:  mobility limitations    Significant Clinical History  Current Anxiety Symptoms:  excessive worry, anxious  Current Depression/Trauma:  hoplessness, helplessness, thoughts of death/suicide  Current Somatic Symptoms:  somatic symptoms (abdominal pain, headache, tension)  Current Psychosis/Thought Disturbance:     Current Eating Symptoms:  loss of appetite  Chemical Use History:  Alcohol: Other (comments)  Last Use:: 07/25/23  Benzodiazepines: None  Opiates: None  Cocaine: None  Marijuana: None  Other Use: None   Past diagnosis:  Depression, Anxiety Disorder  Family history:  Substance Use Disorder  Past treatment:  Psychiatric Medication Management, Individual therapy  Details of most recent treatment:  Has worked with therapist in past but has been constant with medication management.  Other relevant history:          Collateral Information  Is there collateral information: No     Collateral information name, relationship, phone number:       What happened today:       What is different about patient's functioning:       Concern about alcohol/drug use:      What do you think the patient needs:      Has patient made comments about wanting to kill themselves/others:      If d/c is recommended, can they take part in safety/aftercare planning:       Additional collateral information:        Risk Assessment  [place] Suicide Severity Rating Scale Full Clinical Version:  Suicidal Ideation  Q1 Wish to be Dead (Lifetime): Yes  Q2 Non-Specific Active Suicidal Thoughts (Lifetime): Yes  3. Active Suicidal Ideation with any Methods (Not Plan) Without Intent to Act (Lifetime): Yes  Q4 Active Suicidal Ideation with Some Intent to Act, Without Specific Plan (Lifetime): Yes  Q5 Active Suicidal Ideation  with Specific Plan and Intent (Lifetime): Yes  Q6 Suicide Behavior (Lifetime): yes     Suicidal Behavior (Lifetime)  Actual Attempt (Lifetime): Yes  Total Number of Actual Attempts (Lifetime): 1  Actual Attempt Description (Lifetime): Overdose at age 18  Has subject engaged in non-suicidal self-injurious behavior? (Lifetime): No  Interrupted Attempts (Lifetime): No  Aborted or Self-Interrupted Attempt (Lifetime): No  Preparatory Acts or Behavior (Lifetime): No    [place] Suicide Severity Rating Scale Recent:   Suicidal Ideation (Recent)  Q1 Wished to be Dead (Past Month): yes  Q2 Suicidal Thoughts (Past Month): no  Q3 Suicidal Thought Method: no  Q4 Suicidal Intent without Specific Plan: no  Q5 Suicide Intent with Specific Plan: no  Level of Risk per Screen: low risk  Intensity of Ideation (Recent)  Most Severe Ideation Rating (Past 1 Month): 4  Frequency (Past 1 Month): Daily or almost daily  Duration (Past 1 Month): 4-8 hours/most of day  Controllability (Past 1 Month): Can control thoughts with a lot of difficulty  Deterrents (Past 1 Month): Deterrents definitely stopped you from attempting suicide  Reasons for Ideation (Past 1 Month): Completely to end or stop the pain (You couldn't go on living with the pain or how you were feeling)  Suicidal Behavior (Recent)  Actual Attempt (Past 3 Months): No  Has subject engaged in non-suicidal self-injurious behavior? (Past 3 Months): No  Interrupted Attempts (Past 3 Months): No  Aborted or Self-Interrupted Attempt (Past 3 Months): No  Preparatory Acts or Behavior (Past 3 Months): No    Environmental or Psychosocial Events: geographic isolation from supports, worsening chronic illness, loss of a relationship due to divorce/separation  Protective Factors: Protective Factors: strong bond to family unit, community support, or employment, good treatment engagement, able to access care without barriers, good impulse control, optimistic outlook - identification of future  goals    Does the patient have thoughts of harming others? Feels Like Hurting Others: no  Previous Attempt to Hurt Others: no  Is the patient engaging in sexually inappropriate behavior?: no    Is the patient engaging in sexually inappropriate behavior?  no        Mental Status Exam   Affect: Appropriate  Appearance: Appropriate  Attention Span/Concentration: Attentive  Eye Contact: Engaged    Fund of Knowledge: Appropriate   Language /Speech Content: Fluent  Language /Speech Volume: Normal  Language /Speech Rate/Productions: Normal  Recent Memory: Intact  Remote Memory: Intact  Mood: Normal  Orientation to Person: Yes   Orientation to Place: Yes  Orientation to Time of Day: Yes  Orientation to Date: Yes     Situation (Do they understand why they are here?): Yes  Psychomotor Behavior: Normal  Thought Content: Clear  Thought Form: Intact     Mini-Cog Assessment  Number of Words Recalled:    Clock-Drawing Test:     Three Item Recall:    Mini-Cog Total Score:       Medication  Psychotropic medications:   Medication Orders - Psychiatric (From admission, onward)      None             Current Care Team  Patient Care Team:  Aston Perry MD as PCP - General (Internal Medicine)  Viki Rizzo MD as MD (Ophthalmology)  Oralia Morse PsyD (Psychology)  Filemon Levine MD as MD (Dermapathology)  Foster De Guzman MD as MD (Dermatology)  Nolan Lovett MD as MD (Nephrology)  Bradly Juarez MD as MD (Ophthalmology)  Kenneth Bella OD as MD (Optometry)  Angeles Sanchez OT (Inactive) as Occupational Therapist (Occupational Therapy)  Balta Orantes MD as MD (Cardiology)  Helder Hunter LPN as LPN (Cardiology)  Genia Fraser APRN CNP as Nurse Practitioner (Nurse Practitioner Psych/Mental Health)  Genia Fraser as Nurse Practitioner  Genia Fraser APRN CNP as Assigned Behavioral Health Provider  Aston Perry MD as Assigned PCP  Sascha Lundberg MD as Assigned  Heart and Vascular Provider  Harjeet Arce Chi, OD as MD (Optometry)  Foster De Guzman MD as MD (Dermatology)  Nena Velasquez MD as Assigned Endocrinology Provider  Charles Sharif MD as Assigned Sleep Provider  Efren Santos MD as MD (Dermatology)  Cesar Mcginnis APRN CNP as Assigned Nephrology Provider  Efren Santos MD as Assigned Surgical Provider  Aston Perry MD as Assigned Pain Medication Provider    Diagnosis  Patient Active Problem List   Diagnosis Code    BCC (basal cell carcinoma), trunk C44.519    JULIANE (obstructive sleep apnea) G47.33    HTN, kidney transplant related I15.1, Z94.0    Coronary arteriosclerosis due to lipid rich plaque I25.10, I25.83    NSTEMI (non-ST elevated myocardial infarction) (H) I21.4    Depression F32.A    Diverticulosis K57.90    Hemorrhoids K64.9    Kidney replaced by transplant Z94.0    Basal cell carcinoma C44.91    Squamous cell carcinoma C44.92    Dyslipidemia E78.5    CRP elevated R79.82    Glaucoma H40.9    AION (acute ischemic optic neuropathy) H47.019    Paracentral scotoma H53.419    Hip pain M25.559    Inflamed seborrheic keratosis L82.0    Intertrigo L30.4    Chronic hepatitis B (H) B18.1    Immunosuppression (H) D84.9    Hypogonadism in male E29.1    GERD (gastroesophageal reflux disease) K21.9    Aftercare following organ transplant Z48.298    Acute midline low back pain without sciatica M54.50    Septic bursitis M71.10    Impaired mobility Z74.09    CAD -- S/P CABG x 3 in 2020 Z95.1    Abnormal cardiovascular stress test R94.39    HTN (hypertension) I10    End stage renal disease (H) N18.6    Hip pain, right M25.551    Avascular necrosis of bones of both hips (H) M87.051, M87.052    Chest pain, Probably chest wall in origin R07.9    Preoperative examination Z01.818    Coronary artery disease of native artery of native heart with stable angina pectoris (H) I25.118    Failed total hip arthroplasty, sequela T84.018S, Z96.649    Generalized  muscle weakness M62.81    Generalized anxiety disorder F41.1       Clinical Summary and Substantiation of Recommendations   Pt presents due to increased anxiety affecting his ability to sleep. Pt notes several life stressors that have affected his mental health and is willing to engage in treatment for support. Pt is able to make appropriate plans for his safety and denies current suicidal ideation. Pt will discharge with close follow up in place for both therapy and medication management.    Patient coping skills attempted to reduce the crisis:       Disposition  Recommended disposition: Individual Therapy, Medication Management        Reviewed case and recommendations with attending provider. Attending Name: Bradly Puckett,       Ramiro concurs with disposition: yes       Patient and/or validated legal guardian concurs with disposition:   yes       Final disposition:  discharge      Assessment Details   Total duration spent on the patient case in minutes: 40 min     CPT code(s) utilized: 20463 - Psychotherapy for Crisis - 60 (30-74*) min    Sophia Mahmood [facility], Psychotherapist  [facility] - Triage & Transition Services  Callback: [phone]

## 2023-07-26 NOTE — DISCHARGE INSTRUCTIONS
[facility] SCHEDULING:  Today you were seen by a licensed mental health professional through [facility]  for a crisis assessment in the Emergency Department at [facility].  It is recommended that you follow up with your estabished providers (psychiatrist, mental health therapist, and/or primary care doctor - as relevant) as soon as possible. Coordinators from [facility] will be calling you in the next 24-48 hours to ensure that you have the resources you need.  You can also contact [facility] coordinators directly at [phone].    You have been scheduled the following appointments:     Date: Thursday, 7/27/2023  Time: 12:00 pm - 1:00 pm  Provider: Sabrina Cote  Location: [employer] [facility], [address] [place] [address]  Phone: [phone]  Type: Therapy - Initial (In-Person)      [facility] maintains an extensive network of licensed behavioral health providers to connect patients with the services they need.  We do not charge providers a fee to participate in our referral network.  We match patients with providers based on a patient s specific needs, insurance coverage, and location.  Our first effort will be to refer you to a provider within your care system, and will utilize providers outside your care system as needed.     Safety Plan    *If I Am Having Difficulty Or Am In A Crisis, I Will:    ? Contact my established care providers    ? Call Suicide Hotline (2-203-385-KKYO)    ? Go to the nearest Emergency Department    ? Call 9-1-1       Warnings Signs That A Crisis May Be Developing:     I am having increasing suicidal thoughts that turn to plans  My emotions are of hopelessness; feeling like there's no way out.     Things That Make Me Feel Better:            Family, playing chess, reading, calling a friend    People and Social Settings That Provide Distraction:      My friends, family,   Listening to  "music  Journaling  Reading a book  Watching a movie  Meditating  Calling a friend     People Whom I Can Ask For Help:       Family   Friends  Mental health providers      Your [facility] has a mental health crisis team you can call 24/7: [facility] Mobile Crisis  [phone] (adults)  [phone] (children)    Crisis Lines  Crisis Text Line  Text 039372  You will be connected with a trained live crisis counselor to provide support.    Por espanol, texto  DANIA a 612327 o texto a 442-AYUDAME en WhatsApp    National Hope Line  1.800.SUICIDE [7663065]    National Suicide Prevention Lifeline at 4-361-466-PYGP (4356)     Throughout  [place]: call **CRISIS (**504936)       The [place] Project at 403-044-0235       Community Resources  Fast Tracker  Linking people to mental health and substance use disorder resources  fastzePASSckLagoun.org     [place] Mental Health Warm Line  Peer to peer support  Monday thru Saturday, 12 pm to 10 pm  660.242.1177 or 4.356.031.2571  Text \"Support\" to 47618    National [place] on Mental Illness (DARYL)  559.496.1897 or 1.888.DARYL.HELPS        Mental Health Apps  My3  https://Mamapediapp.org/    VirtualHopeBox  https://OpenGamma.org/apps/virtual-hope-box/      Additional Information  Today you were seen by a licensed mental health professional through Triage and Transition services, [facility] (P)  for a crisis assessment in the Emergency Department at [facility].  It is recommended that you follow up with your established providers (psychiatrist, mental health therapist, and/or primary care doctor - as relevant) as soon as possible. Coordinators from [facility] will be calling you in the next 24-48 hours to ensure that you have the resources you need.  You can also contact [facility] coordinators directly at [phone]. You may have been scheduled for or offered an appointment with a mental health provider. [facility] maintains an extensive network of licensed " behavioral health providers to connect patients with the services they need.  We do not charge providers a fee to participate in our referral network.  We match patients with providers based on a patient's specific needs, insurance coverage, and location.  Our first effort will be to refer you to a provider within your care system, and will utilize providers outside your care system as needed.     Anxiety Resources    Stop and Breathe  When anxiety flares, take a time out and think about what it is that is making you so nervous. Anxiety is typically experienced as worrying about a future or past event.  For example, you may be worried that something bad is going to happen in the future. Perhaps you continually feel upset over an event that has already occurred.  Anxiety loses its  when you work to clear your mind of worry and bring your awareness back to the present.  The next time your anxiety starts to take you out of the present, regain control by sitting down and taking a few easy breaths. Simply stopping and breathing can help restore a sense of personal balance and bring you back to the present moment. However, if you have the time, try taking this activity a little further and experiment with a breathing exercise and mantra.    Figure Out What's Bothering You  The physical symptoms of panic and anxiety, such as trembling, chest pain, and rapid heartbeat, are usually more apparent than understanding just what is making you anxious. However, in order to get to the root of your anxiety, you need to figure out what's bothering you. To get to the bottom of your anxiety, put some time aside to exploring your thoughts and feelings.  Writing in a journal can be a great way to get in touch with your sources of anxiety. If anxious feelings seem to be keeping you up at night, try keeping a journal or notepad next to your bed. Write down all of the things that are bothering you. Talking with a friend can be another  way to discover and understand your anxious feelings.1  Make it a habit to regularly uncover and express your feelings of anxiety.    Focus On What You Can Change  Many times anxiety stems from worrying about things that haven't even happened and may never occur. For example, even though everything is okay, you may still worry about potential issues, such as losing your job, becoming ill, or the safety of your loved ones.  Life can be unpredictable and no matter how hard you try, you can't always control what happens. However, you can decide how you are going to deal with the unknown. You can turn your anxiety into a source of strength by letting go of fear and focusing on gratitude.    Replace your fears by shifting your perspective about them. For example, instead of worrying about losing your job, focus instead on how grateful you are to have a job. Come to work determined to do your best. Instead of worrying about your loved one's safety, spend time with them, or express your appreciation of them. With a little practice, you can learn to lessen your anxiety and  a more positive outlook.  At times, your anxiety may actually be caused by a real circumstance in your life. Perhaps you're in a situation where it is realistic to be worried about losing your job due to high company layoffs or talks of downsizing.  When anxiety is identified as being caused by a current problem, then taking action may be the answer to reducing your anxiety.  For example, you may need to start job searching or scheduling interviews after work.  By being more proactive, you can feel like you have a bit more control over your situation.    Focus on Something Less Anxiety-Provoking  At times, it may be most helpful to simply redirect yourself to focus on something other than your anxiety. You may want to reach out to others, do some work around your home, or engage in an enjoyable activity or hobby. Here are a few ideas of things you  can do to thwart off anxiety:  Do some chores or organizing around the house.  Engage in a creative activity, such as drawing, painting, or writing.  Go for a walk or engage in some other form of physical exercise.  Listen to music.  [place] or meditate.  Read a good book or watch a funny movie.  Most people are familiar with experiencing some anxiety from time-to-time. However, chronic anxiety can be a sign of a diagnosable anxiety disorder.  When anxiety affects one's relationships, work performance, and other areas of life, there is potential that these anxious feelings are actually an indication of mental health illness.  If you are experiencing anxiety and panic symptoms, talk with your doctor or other professionals who treat panic disorder. They will be able to address any concerns you have, provide information on diagnosis, and discuss your treatment options.    Grounding Techniques:     Notice where you are, your surroundings, the people, the sounds..   Concentrate on your breathing. Take a deep cleansing breath from your diaphragm. Count the breaths as you exhale. Make sure you breath slowly.  Remind yourself that you are safe.  Hold something that you find comforting, for some it may be a stuffed animal or a blanket. Notice how it feels in your hands. Is it hard or soft?  During a non-crisis time make a list of affirmations. Keep them handy for when you are feeling anxiety. Read the list out loud.    Try the [place] game:   Name 5 things you can see in the room with you  Name 4 things you can feel ( chair on my back  or  feet on floor )   Name 3 things you can hear right now ( people talking  or  music )   Name 2 things you can smell right now (or, 2 things you like the smell of)   Name 1 good thing about yourself    Create A Safe Place     Image a safe place -- it can be a real or imaginary place:   What do you see -- especially colors?   What sounds do you hear?   What sensations do you feel?   What smells do  you smell?   What people or animals would you want in your safe place?   Imagine a protective bubble, wall or boundary around your safe place.   Imagine a door or gate with a guard. Picture a lock and only you can unlock it.  You can draw or make a collage that represents your safe place.   Choose a souvenir of your safe place -- a color, an object, a song.   Keep your image of your safe place so you can come back to it when you need to.    Reduce Extreme Emotion  QUICKLY:  Changing Your Body Chemistry      T:  Change your body Temperature to change your autonomic nervous system   Use Ice Water to calm yourself down FAST   Put your face in a bowl of ice water (this is the best way; have the person keep his/her face in ice water for 30-45 seconds - initial research is showing that the longer s/he can hold her/his face in the water, the better the response), or   Splash ice water on your face, or hold an ice pack on your face      I:  Intensely exercise to calm down a body revved up by emotion   Examples: running, walking fast, jumping, playing basketball, weight lifting, swimming, calisthenics, etc.   Engage in exercises that DO NOT include violent behaviors. Exercises that utilize violent behaviors tend to function as  behavioral rehearsal,  and rather than calming the person down, may actually  rev  the person up more, increasing the likelihood of violence, and lessening the likelihood that they will  burn off  energy     P:  Progressively relax your muscles   Starting with your hands, moving to your forearms, upper arms, shoulders, neck, forehead, eyes, cheeks and lips, tongue and teeth, chest, upper back, stomach, buttocks, thighs, calves, ankles, feet   Tense (10 seconds,   of the way), then relax each muscle (all the way)   Notice the tension   Notice the difference when relaxed (by tensing first, and then relaxing, you are able to get a more thorough relaxation than by simply relaxing)     P: Paced breathing  to relax   The standard technique is to begin with counting the number of steps one takes for a typical inhale, then counting the steps one takes for a typical exhale, and then lengthening the amount of steps for the exhalation by one or two steps.  OR  Repeat this pattern for 1-2 minutes  Inhale for four (4) seconds   Exhale for six (6) to eight (8) seconds   Research demonstrated that one can change one's overall level of anxiety by doing this exercise for even a few minutes per day

## 2023-07-28 ENCOUNTER — VIRTUAL VISIT (OUTPATIENT)
Dept: PSYCHIATRY | Facility: CLINIC | Age: 63
End: 2023-07-28
Attending: NURSE PRACTITIONER
Payer: COMMERCIAL

## 2023-07-28 ENCOUNTER — DOCUMENTATION ONLY (OUTPATIENT)
Dept: INTERNAL MEDICINE | Facility: CLINIC | Age: 63
End: 2023-07-28

## 2023-07-28 DIAGNOSIS — F33.41 RECURRENT MAJOR DEPRESSIVE DISORDER, IN PARTIAL REMISSION (H): ICD-10-CM

## 2023-07-28 PROCEDURE — 99443 PR PHYSICIAN TELEPHONE EVALUATION 21-30 MIN: CPT | Mod: 95 | Performed by: NURSE PRACTITIONER

## 2023-07-28 RX ORDER — HYDROXYZINE HYDROCHLORIDE 10 MG/1
10 TABLET, FILM COATED ORAL EVERY 8 HOURS PRN
Qty: 60 TABLET | Refills: 1 | Status: SHIPPED | OUTPATIENT
Start: 2023-07-28 | End: 2023-10-16
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
## 2023-07-28 NOTE — PROGRESS NOTES
Type of Form Received:     Form Received (Date) 7/28/23   Form Filled out Yes, 8/8/23   Placed in provider folder Yes

## 2023-07-31 ENCOUNTER — TELEPHONE (OUTPATIENT)
Dept: PSYCHIATRY | Facility: CLINIC | Age: 63
End: 2023-07-31
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
## 2023-08-01 ENCOUNTER — DOCUMENTATION ONLY (OUTPATIENT)
Dept: INTERNAL MEDICINE | Facility: CLINIC | Age: 63
End: 2023-08-01

## 2023-08-01 ENCOUNTER — MEDICAL CORRESPONDENCE (OUTPATIENT)
Dept: INTERNAL MEDICINE | Facility: CLINIC | Age: 63
End: 2023-08-01

## 2023-08-01 ENCOUNTER — MEDICAL CORRESPONDENCE (OUTPATIENT)
Dept: HEALTH INFORMATION MANAGEMENT | Facility: CLINIC | Age: 63
End: 2023-08-01

## 2023-08-01 DIAGNOSIS — Z53.9 DIAGNOSIS NOT YET DEFINED: Primary | ICD-10-CM

## 2023-08-01 PROCEDURE — G0179 MD RECERTIFICATION HHA PT: HCPCS | Performed by: INTERNAL MEDICINE

## 2023-08-01 NOTE — TELEPHONE ENCOUNTER
"Received returned call from patient. Patient reports Sunday he had a good day but had difficulty sleeping at night. Due to lack of sleep, patient purchased alcohol and has few shots of whisky and was able to sleep from 3 pm to 9 pm but was not able to sleep at night. He felt anxious at night and took Hydroxyzine. Today he is feeling \"washedout\" due to feeling lethargic from lack of sleep at night. He had OT today and has a nurse visit scheduled for vital and review medications. Patient expressed that this is the last nurse visit he will have. He reports doing well from surgery but shared it's been a tough recovery road. He denies any active safety concerns. He agreed to come in the ED or call 911 if safety is a concern. Writer offered patient after hours number as well as COPE but he confirmed having them on hand.     Lastly, patient is waiting for a call for alcohol and drug use evaluation.  Writer agreed to pass this along to provider.   "

## 2023-08-01 NOTE — PROGRESS NOTES
Type of Form Received:     Form Received (Date) 8/1/23   Form Filled out Yes 8/2/23   Placed in provider folder Yes

## 2023-08-01 NOTE — TELEPHONE ENCOUNTER
Second attempt made to reach out to patient for this week. No answer at number provided. LVM, requesting a call back. Clinic number provided.     Next scheduled call by this writer on 8/7. Reminder placed in epic.     Provider notified as an YODIT

## 2023-08-02 ENCOUNTER — TELEPHONE (OUTPATIENT)
Dept: BEHAVIORAL HEALTH | Facility: CLINIC | Age: 63
End: 2023-08-02

## 2023-08-02 NOTE — TELEPHONE ENCOUNTER
Writer located a referral for CD assessment phone number ([phone]).     Reached out to patient to relay above phone number for CD assessment. He confirmed getting a call from them and is scheduled.     No further action needed by this writer

## 2023-08-02 NOTE — TELEPHONE ENCOUNTER
Pt is a(n) adult (18+ out of HS) Seeking as eval for Adult ROBERT Assessment for evaluation and recommendations..  Appointment scheduled by:  Patient.  (self-pay - complete Cost Estimate)   egal Guardianship Reviewed?  No  Brief reason for appt:  Per Pt, has hx of Alcohol and Opoid use   needed?  NO    Contact information verified/updated: Yes    Carlos Arriaga

## 2023-08-03 ENCOUNTER — DOCUMENTATION ONLY (OUTPATIENT)
Dept: INTERNAL MEDICINE | Facility: CLINIC | Age: 63
End: 2023-08-03

## 2023-08-07 ENCOUNTER — TELEPHONE (OUTPATIENT)
Dept: PSYCHIATRY | Facility: CLINIC | Age: 63
End: 2023-08-07
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
## 2023-08-07 NOTE — TELEPHONE ENCOUNTER
Received incoming call from patient returning this writer's call. Patient shared doing well the past two days. Shared he had a rough day Friday and Saturday morning due to lack of sleep. He did drink on Saturday but is doing well since the past few days. He denies any safety concerns. He is recovering well from surgery. He continues to get OT, PT and patient aid come to home for help. He is more mobile now and went out for lunch with friends this afternoon. He plans to attend AA meeting this evening. He reports good support from friends and family. Denies any thoughts of SI/HI at this time. Agreed to come into the ED if safety is a concern. He has resources for after hours or COPE if needed. Updated patient that due to this writer being out of clinic on 8/14, a check in call will be made on 8/15. He agreed with the plan.     Routed to provider as YODIT

## 2023-08-08 DIAGNOSIS — Z53.9 DIAGNOSIS NOT YET DEFINED: Primary | ICD-10-CM

## 2023-08-09 ENCOUNTER — DOCUMENTATION ONLY (OUTPATIENT)
Dept: INTERNAL MEDICINE | Facility: CLINIC | Age: 63
End: 2023-08-09

## 2023-08-09 NOTE — PROGRESS NOTES
Type of Form Received:     Form Received (Date) 8/9/23   Form Filled out Yes, faxed 8/29/23 AV   Placed in provider folder Yes

## 2023-08-11 ENCOUNTER — APPOINTMENT (OUTPATIENT)
Dept: RADIOLOGY | Facility: HOSPITAL | Age: 63
End: 2023-08-11
Attending: EMERGENCY MEDICINE
Payer: COMMERCIAL

## 2023-08-11 ENCOUNTER — NURSE TRIAGE (OUTPATIENT)
Dept: NURSING | Facility: CLINIC | Age: 63
End: 2023-08-11

## 2023-08-11 ENCOUNTER — TELEPHONE (OUTPATIENT)
Dept: INTERNAL MEDICINE | Facility: CLINIC | Age: 63
End: 2023-08-11

## 2023-08-11 ENCOUNTER — HOSPITAL ENCOUNTER (EMERGENCY)
Facility: HOSPITAL | Age: 63
Discharge: HOME | End: 2023-08-11
Attending: EMERGENCY MEDICINE | Admitting: EMERGENCY MEDICINE
Payer: COMMERCIAL

## 2023-08-11 VITALS
OXYGEN SATURATION: 93 % | DIASTOLIC BLOOD PRESSURE: 56 MMHG | SYSTOLIC BLOOD PRESSURE: 116 MMHG | WEIGHT: 180 LBS | BODY MASS INDEX: 28.19 KG/M2 | HEART RATE: 80 BPM | RESPIRATION RATE: 24 BRPM

## 2023-08-11 DIAGNOSIS — I95.9 TRANSIENT HYPOTENSION: ICD-10-CM

## 2023-08-11 DIAGNOSIS — E87.6 HYPOKALEMIA: ICD-10-CM

## 2023-08-11 LAB
ALBUMIN SERPL BCG-MCNC: 3.1 G/DL (ref 3.5–5.2)
ALBUMIN UR-MCNC: 10 MG/DL
ALP SERPL-CCNC: 61 U/L (ref 40–129)
ALT SERPL W P-5'-P-CCNC: 10 U/L (ref 0–70)
ANION GAP SERPL CALCULATED.3IONS-SCNC: 13 MMOL/L (ref 7–15)
APPEARANCE UR: CLEAR
AST SERPL W P-5'-P-CCNC: 38 U/L (ref 0–45)
BASOPHILS # BLD AUTO: 0.1 10E3/UL (ref 0–0.2)
BASOPHILS NFR BLD AUTO: 1 %
BILIRUB SERPL-MCNC: 0.7 MG/DL
BILIRUB UR QL STRIP: NEGATIVE
BUN SERPL-MCNC: 5.7 MG/DL (ref 8–23)
CALCIUM SERPL-MCNC: 9 MG/DL (ref 8.8–10.2)
CHLORIDE SERPL-SCNC: 106 MMOL/L (ref 98–107)
COLOR UR AUTO: ABNORMAL
CREAT SERPL-MCNC: 0.65 MG/DL (ref 0.67–1.17)
DEPRECATED HCO3 PLAS-SCNC: 23 MMOL/L (ref 22–29)
EOSINOPHIL # BLD AUTO: 0.4 10E3/UL (ref 0–0.7)
EOSINOPHIL NFR BLD AUTO: 5 %
ERYTHROCYTE [DISTWIDTH] IN BLOOD BY AUTOMATED COUNT: 15.9 % (ref 10–15)
FLUAV RNA SPEC QL NAA+PROBE: NEGATIVE
FLUBV RNA RESP QL NAA+PROBE: NEGATIVE
GFR SERPL CREATININE-BSD FRML MDRD: >90 ML/MIN/1.73M2
GLUCOSE SERPL-MCNC: 94 MG/DL (ref 70–99)
GLUCOSE UR STRIP-MCNC: NEGATIVE MG/DL
HCT VFR BLD AUTO: 36.9 % (ref 40–53)
HGB BLD-MCNC: 11.4 G/DL (ref 13.3–17.7)
HGB UR QL STRIP: NEGATIVE
HOLD SPECIMEN: NORMAL
HYALINE CASTS: 1 /LPF
IMM GRANULOCYTES # BLD: 0 10E3/UL
IMM GRANULOCYTES NFR BLD: 0 %
KETONES UR STRIP-MCNC: 10 MG/DL
LEUKOCYTE ESTERASE UR QL STRIP: NEGATIVE
LYMPHOCYTES # BLD AUTO: 1.9 10E3/UL (ref 0.8–5.3)
LYMPHOCYTES NFR BLD AUTO: 23 %
MCH RBC QN AUTO: 27.6 PG (ref 26.5–33)
MCHC RBC AUTO-ENTMCNC: 30.9 G/DL (ref 31.5–36.5)
MCV RBC AUTO: 89 FL (ref 78–100)
MONOCYTES # BLD AUTO: 1.1 10E3/UL (ref 0–1.3)
MONOCYTES NFR BLD AUTO: 14 %
MUCOUS THREADS #/AREA URNS LPF: PRESENT /LPF
NEUTROPHILS # BLD AUTO: 4.8 10E3/UL (ref 1.6–8.3)
NEUTROPHILS NFR BLD AUTO: 57 %
NITRATE UR QL: NEGATIVE
NRBC # BLD AUTO: 0 10E3/UL
NRBC BLD AUTO-RTO: 0 /100
PH UR STRIP: 6 [PH] (ref 5–7)
PLATELET # BLD AUTO: 379 10E3/UL (ref 150–450)
POTASSIUM SERPL-SCNC: 3.3 MMOL/L (ref 3.4–5.3)
PROT SERPL-MCNC: 5.5 G/DL (ref 6.4–8.3)
RBC # BLD AUTO: 4.13 10E6/UL (ref 4.4–5.9)
RBC URINE: 0 /HPF
RSV RNA SPEC NAA+PROBE: NEGATIVE
SARS-COV-2 RNA RESP QL NAA+PROBE: NEGATIVE
SODIUM SERPL-SCNC: 142 MMOL/L (ref 136–145)
SP GR UR STRIP: 1.01 (ref 1–1.03)
SQUAMOUS EPITHELIAL: <1 /HPF
UROBILINOGEN UR STRIP-MCNC: <2 MG/DL
WBC # BLD AUTO: 8.4 10E3/UL (ref 4–11)
WBC URINE: 3 /HPF

## 2023-08-11 PROCEDURE — 85025 COMPLETE CBC W/AUTO DIFF WBC: CPT | Performed by: EMERGENCY MEDICINE

## 2023-08-11 PROCEDURE — 99285 EMERGENCY DEPT VISIT HI MDM: CPT | Mod: 25

## 2023-08-11 PROCEDURE — 81001 URINALYSIS AUTO W/SCOPE: CPT | Performed by: EMERGENCY MEDICINE

## 2023-08-11 PROCEDURE — 36415 COLL VENOUS BLD VENIPUNCTURE: CPT | Performed by: EMERGENCY MEDICINE

## 2023-08-11 PROCEDURE — 87637 SARSCOV2&INF A&B&RSV AMP PRB: CPT | Performed by: EMERGENCY MEDICINE

## 2023-08-11 PROCEDURE — 71046 X-RAY EXAM CHEST 2 VIEWS: CPT

## 2023-08-11 PROCEDURE — 250N000013 HC RX MED GY IP 250 OP 250 PS 637: Performed by: EMERGENCY MEDICINE

## 2023-08-11 PROCEDURE — 93005 ELECTROCARDIOGRAM TRACING: CPT | Performed by: STUDENT IN AN ORGANIZED HEALTH CARE EDUCATION/TRAINING PROGRAM

## 2023-08-11 PROCEDURE — 80053 COMPREHEN METABOLIC PANEL: CPT | Performed by: EMERGENCY MEDICINE

## 2023-08-11 RX ORDER — POTASSIUM CHLORIDE 600 MG/1
8 TABLET, FILM COATED, EXTENDED RELEASE ORAL 2 TIMES DAILY
Qty: 30 TABLET | Refills: 1 | Status: SHIPPED | OUTPATIENT
Start: 2023-08-11 | End: 2023-08-20

## 2023-08-11 RX ORDER — POTASSIUM CHLORIDE 1500 MG/1
40 TABLET, EXTENDED RELEASE ORAL ONCE
Status: COMPLETED | OUTPATIENT
Start: 2023-08-11 | End: 2023-08-11

## 2023-08-11 RX ADMIN — POTASSIUM CHLORIDE 40 MEQ: 1500 TABLET, EXTENDED RELEASE ORAL at 18:42

## 2023-08-11 ASSESSMENT — ACTIVITIES OF DAILY LIVING (ADL)
ADLS_ACUITY_SCORE: 37

## 2023-08-11 NOTE — ED PROVIDER NOTES
EMERGENCY DEPARTMENT ENCOUNTER      NAME: Jerad Ross  AGE: 63 year old male  YOB: 1960  MRN: [id number]  EVALUATION DATE & TIME: 8/11/2023  2:43 PM    PCP: Aston Perry    ED PROVIDER: Aki Edwards M.D.      Chief Complaint   Patient presents with    Dizziness    Hypotension         FINAL IMPRESSION:  Dizziness  Transient hypotension  Hypokalemia      ED COURSE & MEDICAL DECISION MAKING:    Pertinent Labs & Imaging studies reviewed. (See chart for details)  63 year old male presents to the Emergency Department for evaluation of fatigue and weakness.  Symptoms ongoing for last few days..  With a little dizziness with ambulation.  Home health nurse today took his blood pressure and found to be markedly reduced.  Patient reports she has been eating a little bit less but not too badly.  We will of the note from 7/25/2023 at outlying facility indicates patient with prior coronary artery disease and bypass grafting x3 in 2020.  Review of note from 8/2/2022 for his nuclear med Lexiscan reveals ejection fraction of 70%.  Patient without evidence of change or inducible ischemia when compared to prior study 9/8/2021.  Findings both very reassuring.  Labs to be obtained assess for potential electrolyte imbalance, anemia would be contributory.   denies any vomiting or diarrhea that lead to fluid losses.  Patient with distant history patient appears non toxic with stable vitals signs. Overall exam is benign.  Treated with bolus of saline x1 L given reports of hypotension.  Patient also with history of renal transplant.    2:53 PM I met with the patient for the initial interview and physical examination. Discussed plan for treatment and workup in the ED.    6:25 PM.  Patient is laboratory evaluation with minimal anemia with hemoglobin 11.4.  Basic metabolic panel with minimal hypokalemia potassium 3.3.  BUN and creatinine subtherapeutic.  Remainder of results unremarkable.  We will proceed with  potassium supplementation.  Awaiting urine analysis.  COVID, influenza and RSV negative.  1:20 PM.  Patient feeling improved.  Does not feel he can provide a urinary specimen.  The risk of a urinary tract infection of the circumstances remote.  Patient likely feeling weak due to borderline hypotension and hypokalemia.  Patient encouraged to get support type drinks to boost his blood pressure and will be discharged with supplemental potassium.  At the conclusion of the encounter I discussed the results of all of the tests and the disposition. The questions were answered and return precautions provided. The patient or family acknowledged understanding and was agreeable with the care plan.     Medical Decision Making    History:  Supplemental history from: Documented in chart, if applicable  External Record(s) reviewed: Documented in chart, if applicable.    Work Up:  Chart documentation includes differential considered and any EKGs or imaging independently interpreted by provider, where specified.  In additional to work up documented, I considered the following work up: Documented in chart, if applicable.    External consultation:  Discussion of management with another provider: Documented in chart, if applicable    Complicating factors:  Care impacted by chronic illness: Heart Disease, Hyperlipidemia, and Hypertension  Care affected by social determinants of health: N/A    Disposition considerations: Discharge. I prescribed additional prescription strength medication(s) as charted. I considered admission, but discharged patient after significant clinical improvement.        MEDICATIONS GIVEN IN THE EMERGENCY:  Medications - No data to display    NEW PRESCRIPTIONS STARTED AT TODAY'S ER VISIT  New Prescriptions    No medications on file          =================================================================    HPI    Patient information was obtained from: Patient     Use of Intrepreter: N/A         Jerad Ross is  a 63 year old male with a pertient medical history of hypertension, HLD, JULIANE, depression, CAD s/p CABG x 3 9/20/2020, NSTEMI and chronic hep B  who presents to the ED for evaluation of dizziness.    Patient has felt lethargic and feeling dizzy since yesterday. Today, patients home health nurse found his BP to be 80 systolic while sitting and 60 systolic when standing. Patient denies nausea, vomiting, diarrhea, fever, chills, covid exposure.     REVIEW OF SYSTEMS   Constitutional:  Denies fever, chills. Positive for lethargic  Respiratory:  Denies productive cough or increased work of breathing  Cardiovascular:  Denies chest pain, palpitations  GI:  Denies abdominal pain, nausea, vomiting, or change in bowel or bladder habits   Musculoskeletal:  Denies any new muscle/joint swelling  Skin:  Denies rash   Neurologic:  Positive for dizziness   All systems negative except as marked.     PAST MEDICAL HISTORY:  Past Medical History:   Diagnosis Date    Acute midline low back pain without sciatica     AION (acute ischemic optic neuropathy)     Anemia in chronic renal disease     Arthritis     Avascular necrosis of bones of both hips (H)     s/p bilateral hip replacements    Avascular necrosis of bones of both hips (H)     s/p bilateral hip replacements    Basal cell carcinoma     Chronic hepatitis B (H)     Clostridium difficile colitis     COPD (chronic obstructive pulmonary disease) (H)     Coronary artery disease involving native coronary artery of native heart without angina pectoris 06/17/2014    Coronary angiogram 6/17/14: Severe distal 3-vessel disease involving small vessels, not amenable to PCI or CABG.    CRP elevated     Depression     Depressive disorder     Diverticulosis     Dyslipidemia     Elevated C-reactive protein (CRP)     FSGS (focal segmental glomerulosclerosis)     FSGS (focal segmental glomerulosclerosis)     Gastric ulcer with hemorrhage 02/12/2012    Gastric ulcer with hemorrhage 02/12/2012    GERD  (gastroesophageal reflux disease)     Glaucoma     OHTN    Glaucoma     Hemorrhoids     Hemorrhoids     History of blood transfusion     HTN (hypertension)     Hyperlipidemia     Hypertension secondary to other renal disorders     Hypogonadism in male     Immunosuppressed status (H)     Kidney replaced by transplant     focal glomerulosclerosis     Kidney transplanted     focal glomerulosclerosis     NSTEMI (non-ST elevated myocardial infarction) (H) 06/17/2014    NSTEMI (non-ST elevated myocardial infarction) (H) 06/17/2014    JULIANE (obstructive sleep apnea)     Doesn't use CPAP    Paracentral scotoma     LE    Secondary renal hyperparathyroidism (H)     Secondary renal hyperparathyroidism (H)     Septic bursitis     Squamous cell carcinoma     Uncomplicated asthma        PAST SURGICAL HISTORY:  Past Surgical History:   Procedure Laterality Date    APPENDECTOMY      ARTHROPLASTY REVISION HIP Right 6/19/2023    Procedure: RIGHT HIP REVISION;  Surgeon: Juan Mcdonough MD;  Location:  OR    BIOPSY      Cardiac Bypass surgery  06/08/2020    [facility]     CATARACT EXTRACTION EXTRACAPSULAR W/ INTRAOCULAR LENS IMPLANTATION Bilateral 4-20-10, 6-1-10    CATARACT IOL, RT/LT  04/19/2000    RE    CATARACT IOL, RT/LT  06/01/2000    LE    COLECTOMY PARTIAL  01/01/1983     10 cm, diverticulitis     COLECTOMY SUBTOTAL  1983    10 cm, diverticulitis    COLONOSCOPY  02/13/2012    Procedure:COLONOSCOPY; Surgeon:SLOAN GALLARDO; Location: GI    COLONOSCOPY N/A 01/22/2020    Procedure: Colonoscopy, With Polypectomy And Biopsy;  Surgeon: Aston Kiran MD;  Location:  GI    CV CORONARY ANGIOGRAM N/A 06/02/2020    Procedure: Coronary Angiogram;  Surgeon: Alona Florentino MD;  Location: [facility] Cath Lab;  Service: Cardiology    CV CORONARY ANGIOGRAM N/A 09/20/2021    Procedure: Coronary Angiogram;  Surgeon: Adam Agudelo MD;  Location: [facility] CATH LAB CV    CV LEFT HEART CATHETERIZATION WITHOUT LEFT  VENTRICULOGRAM Left 06/02/2020    Procedure: Left Heart Catheterization Without Left Ventriculogram;  Surgeon: Alona Florentino MD;  Location: [facility] Cath Lab;  Service: Cardiology    CV SUPRAVALVULAR AORTOGRAM N/A 09/20/2021    Procedure: Supravalvular Aortagram;  Surgeon: Adam Agudelo MD;  Location: [facility] CATH LAB CV    ESOPHAGOSCOPY, GASTROSCOPY, DUODENOSCOPY (EGD), COMBINED  02/13/2012    Procedure:COMBINED ESOPHAGOSCOPY, GASTROSCOPY, DUODENOSCOPY (EGD); Surgeon:SLOAN GALLARDO; Location: GI    ESOPHAGOSCOPY, GASTROSCOPY, DUODENOSCOPY (EGD), COMBINED  02/13/2012    EXTRACAPSULAR CATARACT EXTRATION WITH INTRAOCULAR LENS IMPLANT  4-20-10, 6-1-10    Rt, Lt    HERNIA REPAIR  1995    Lt inguinal    HERNIA REPAIR  01/01/1995    Lt inguinal    HIP SURGERY      1981, bilat MITZI, revised 2001, 2005    HIP SURGERY  01/01/1981    bilat MITZI, revised 2001, 2005    IR FINE NEEDLE ASPIRATION W ULTRASOUND  04/10/2023    KIDNEY SURGERY  1978 and 1993    transplant    KNEE SURGERY  1983, 1987    bilat TKA    MOHS MICROGRAPHIC PROCEDURE      MOHS MICROGRAPHIC PROCEDURE      ORTHOPEDIC SURGERY      OTHER SURGICAL HISTORY      kidney [id number], 1993    PHACOEMULSIFICATION CLEAR CORNEA WITH STANDARD INTRAOCULAR LENS IMPLANT Right 04/19/2000    PHACOEMULSIFICATION CLEAR CORNEA WITH STANDARD INTRAOCULAR LENS IMPLANT Left 06/01/2000    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, auxiliary spleen    TONSILLECTOMY      TRANSPLANT      VASCULAR SURGERY  1976         CURRENT MEDICATIONS:    No current facility-administered medications for this encounter.    Current Outpatient Medications:     acetaminophen (TYLENOL) 500 MG tablet, Take 2 tablets (1,000 mg) by mouth 3 times daily, Disp: 1 tablet, Rfl: 0    albuterol (PROAIR HFA) 108 (90 Base) MCG/ACT inhaler, Inhale 2 puffs into the lungs every 6 hours as needed for shortness of breath / dyspnea or wheezing, Disp: 1 g, Rfl: 11    aspirin 81 MG EC tablet, Resume usual  dose of aspirin after finishing increased dose prescribed after surgery, Disp: , Rfl:     aspirin 81 MG EC tablet, Take 1 tablet (81 mg) by mouth 2 times daily, Disp: 60 tablet, Rfl: 0    budesonide (PULMICORT FLEXHALER) 90 MCG/ACT inhaler, Inhale 2 puffs into the lungs 2 times daily, Disp: 3 each, Rfl: 2    calcium citrate-vitamin D (CITRACAL) 315-200 MG-UNIT TABS, Take 1 tablet by mouth daily., Disp: , Rfl:     entecavir (BARACLUDE) 0.5 MG tablet, Take 1 tablet (0.5 mg) by mouth daily, Disp: 90 tablet, Rfl: 1    FLUoxetine (PROZAC) 20 MG capsule, Take 1 capsule (20 mg) by mouth daily With 40mg for a total daily dose of 60mg, Disp: 30 capsule, Rfl: 1    FLUoxetine (PROZAC) 40 MG capsule, Take 1 capsule (40 mg) by mouth daily, Disp: 90 capsule, Rfl: 2    fluticasone-salmeterol (ADVAIR) 250-50 MCG/ACT inhaler, Inhale 1 puff into the lungs 2 times daily as needed (PRN), Disp: 180 each, Rfl: 3    furosemide (LASIX) 20 MG tablet, Take 1 tablet (20 mg) by mouth daily as needed (fluid retention), Disp: 90 tablet, Rfl: 1    hydrOXYzine (ATARAX) 10 MG tablet, Take 1 tablet (10 mg) by mouth every 8 hours as needed for anxiety, Disp: 60 tablet, Rfl: 1    isosorbide mononitrate (IMDUR) 60 MG 24 hr tablet, Take 60 mg by mouth daily Start taking when 30mg tablets gone, Disp: , Rfl:     latanoprost (XALATAN) 0.005 % ophthalmic solution, Place 1 drop into both eyes At Bedtime, Disp: 2.5 mL, Rfl: 11    Lidocaine (LIDOCARE) 4 % Patch, Place 1 patch onto the skin daily as needed for moderate pain To prevent lidocaine toxicity, patient should be patch free for 12 hrs daily., Disp: , Rfl:     metoprolol succinate ER (TOPROL XL) 25 MG 24 hr tablet, Take 0.5 tablets (12.5 mg) by mouth daily, Disp: 15 tablet, Rfl: 0    Multiple Vitamins-Iron (ONE DAILY MULTIVITAMIN/IRON) TABS, Take 1 tablet by mouth daily, Disp: , Rfl:     mycophenolate (GENERIC EQUIVALENT) 250 MG capsule, Take 3 capsules (750 mg) by mouth 2 times daily, Disp: 540  capsule, Rfl: 3    nitroGLYcerin (NITROSTAT) 0.4 MG sublingual tablet, Place 1 tablet (0.4 mg) under the tongue every 5 minutes as needed for chest pain, Disp: 20 tablet, Rfl: 3    Omega-3 Fatty Acids (OMEGA 3 PO), Take 1 capsule by mouth daily Dose unknown, Disp: , Rfl:     oxyCODONE (ROXICODONE) 5 MG tablet, Take 1-2 tablets (5-10 mg) by mouth every 4 hours as needed for pain, Disp: 60 tablet, Rfl: 0    pantoprazole (PROTONIX) 40 MG EC tablet, Take 1 tablet (40 mg) by mouth daily, Disp: 90 tablet, Rfl: 3    polyethylene glycol (MIRALAX) 17 g packet, Take 17 g by mouth daily as needed , Disp: , Rfl:     predniSONE (DELTASONE) 5 MG tablet, Take 1 tablet (5 mg) by mouth daily, Disp: 90 tablet, Rfl: 3    rosuvastatin (CRESTOR) 20 MG tablet, TAKE 1 TABLET(20 MG) BY MOUTH DAILY, Disp: 90 tablet, Rfl: 3    tacrolimus (PROTOPIC) 0.1 % external ointment, Apply topically 2 times daily as needed, Disp: , Rfl:     ALLERGIES:  Allergies   Allergen Reactions    Penicillins Shortness Of Breath and Hives    Keflex [Cephalexin Hcl] Unknown     Patient could not recall reaction    Sulfa Antibiotics Rash    Tetracycline Unknown     Patient could not recall reaction       FAMILY HISTORY:  Family History   Problem Relation Age of Onset    Cardiovascular Father         AI with valve repair    Hypertension Father     Kidney Disease Father     Other - See Comments Father         AI with valve repair    Cerebrovascular Disease Maternal Grandfather     Other - See Comments Maternal Grandfather         cerebrovascular disease    Cancer Paternal Grandmother         ovarian ca    Ovarian Cancer Paternal Grandmother     Cerebrovascular Disease Paternal Grandfather     Kidney Disease Paternal Aunt     Kidney Disease Paternal Aunt     Skin Cancer No family hx of     Glaucoma No family hx of     Macular Degeneration No family hx of     Amblyopia No family hx of     Melanoma No family hx of     Diabetes No family hx of        SOCIAL HISTORY:    Social History     Socioeconomic History    Marital status:      Spouse name: None    Number of children: 0    Years of education: None    Highest education level: None   Occupational History     Employer: DISABLED    Occupation:      Employer: [employer]   Tobacco Use    Smoking status: Former     Packs/day: 1.00     Years: 9.00     Pack years: 9.00     Types: Cigarettes     Start date: 1980     Quit date: 1988     Years since quittin.6    Smokeless tobacco: Former   Vaping Use    Vaping Use: Never used   Substance and Sexual Activity    Alcohol use: Not Currently     Comment: rarely 1 drink per month    Drug use: Not Currently     Types: Oxycodone    Sexual activity: Not Currently     Partners: Female   Other Topics Concern    Parent/sibling w/ CABG, MI or angioplasty before 65F 55M? No    Special Diet No    Exercise Yes     Comment: walks   Social History Narrative    . Wife is also a kidney transplant recipient.     Works part-time       VITALS:  Patient Vitals for the past 24 hrs:   BP Pulse Resp SpO2 Weight   23 1452 -- -- -- 94 % --   23 1451 100/56 80 30 -- --   23 1450 -- -- 18 -- 81.6 kg (180 lb)        PHYSICAL EXAM    Constitutional:  Awake, alert, in mild apparent distress  HENT:  Normocephalic, Atraumatic. Bilateral external ears normal. Oropharynx moist. Nose normal. Neck- Normal range of motion with no guarding,  Supple, No stridor.   Eyes:  PERRL, EOMI with no signs of entrapment, Conjunctiva normal, No discharge.   Respiratory:  Normal breath sounds, No respiratory distress, No wheezing.    Cardiovascular:  Normal heart rate, Normal rhythm, No appreciable rubs or gallops.   GI:  Soft, No tenderness, No distension, No palpable masses  Musculoskeletal:   No edema. Good range of motion in all major joints. No tenderness to palpation or major deformities noted.  Integument:  Warm, Dry, No erythema, No rash.   Neurologic:  Alert &  oriented, Normal motor function, Normal sensory function, No focal deficits noted.   Psychiatric:  Affect normal, Judgment normal, Mood normal.     LAB:  All pertinent labs reviewed and interpreted.     Results for orders placed or performed during the hospital encounter of 08/11/23   Chest XR,  PA & LAT     Status: None    Narrative    EXAM: XR CHEST 2 VIEWS  LOCATION: [facility]  DATE: 8/11/2023    INDICATION: Malaise, immunocompromise status post renal transplant  COMPARISON: 08/02/2021      Impression    IMPRESSION: Minimal linear left basilar atelectasis/scarring. Lungs are otherwise clear. No effusions or focal consolidation. Mild cardiomegaly. No pulmonary edema CABG changes and sternotomy wires. Degenerative change of the bilateral shoulders and   visualized spine.   [place] Draw     Status: None    Narrative    The following orders were created for panel order [place] [facility].  Procedure                               Abnormality         Status                     ---------                               -----------         ------                     Extra Blue Top Tube[[id number]]                              Final result               Extra Red Top Tube[[id number]]                               Final result               Extra Green Top (Lithium...[[id number]]                      Final result               Extra Purple Top Tube[[id number]]                            Final result               Extra Green Top (Lithium...[[id number]]                      Final result                 Please view results for these tests on the individual orders.   Extra Blue Top Tube     Status: None   Result Value Ref Range    Hold Specimen [facility]    Extra Red Top Tube     Status: None   Result Value Ref Range    Hold Specimen [facility]    Extra Green Top (Lithium Heparin) Tube     Status: None   Result Value Ref Range    Hold Specimen [facility]    Extra Purple Top Tube     Status: None   Result Value Ref Range    Hold  Specimen [facility]    Extra Green Top (Lithium Heparin) ON ICE     Status: None   Result Value Ref Range    Hold Specimen [facility]    Comprehensive metabolic panel     Status: Abnormal   Result Value Ref Range    Sodium 142 136 - 145 mmol/L    Potassium 3.3 (L) 3.4 - 5.3 mmol/L    Chloride 106 98 - 107 mmol/L    Carbon Dioxide (CO2) 23 22 - 29 mmol/L    Anion Gap 13 7 - 15 mmol/L    Urea Nitrogen 5.7 (L) 8.0 - 23.0 mg/dL    Creatinine 0.65 (L) 0.67 - 1.17 mg/dL    Calcium 9.0 8.8 - 10.2 mg/dL    Glucose 94 70 - 99 mg/dL    Alkaline Phosphatase 61 40 - 129 U/L    AST 38 0 - 45 U/L    ALT 10 0 - 70 U/L    Protein Total 5.5 (L) 6.4 - 8.3 g/dL    Albumin 3.1 (L) 3.5 - 5.2 g/dL    Bilirubin Total 0.7 <=1.2 mg/dL    GFR Estimate >90 >60 mL/min/1.73m2   Symptomatic Influenza A/B, RSV, & SARS-CoV2 PCR (COVID-19) Nasopharyngeal     Status: Normal    Specimen: Nasopharyngeal; Swab   Result Value Ref Range    Influenza A PCR Negative Negative    Influenza B PCR Negative Negative    RSV PCR Negative Negative    SARS CoV2 PCR Negative Negative    Narrative    Testing was performed using the Xpert Xpress CoV2/Flu/RSV Assay on the [employer] GeneXpert Instrument. This test should be ordered for the detection of SARS-CoV-2, influenza, and RSV viruses in individuals who meet clinical and/or epidemiological criteria. Test performance is unknown in asymptomatic patients. This test is for in vitro diagnostic use under the FDA EUA for laboratories certified under CLIA to perform high or moderate complexity testing. This test has not been FDA cleared or approved. A negative result does not rule out the presence of PCR inhibitors in the specimen or target RNA in concentration below the limit of detection for the assay. If only one viral target is positive but coinfection with multiple targets is suspected, the sample should be re-tested with another FDA cleared, approved, or authorized test, if coinfection would change clinical management. This test  was validated by the [facility] [employer]. These laboratories are certified under the Clinical Laboratory Improvement Amendments of 1988 (CLIA-88) as qualified to perform high complexity laboratory testing.   CBC with platelets and differential     Status: Abnormal   Result Value Ref Range    WBC Count 8.4 4.0 - 11.0 10e3/uL    RBC Count 4.13 (L) 4.40 - 5.90 10e6/uL    Hemoglobin 11.4 (L) 13.3 - 17.7 g/dL    Hematocrit 36.9 (L) 40.0 - 53.0 %    MCV 89 78 - 100 fL    MCH 27.6 26.5 - 33.0 pg    MCHC 30.9 (L) 31.5 - 36.5 g/dL    RDW 15.9 (H) 10.0 - 15.0 %    Platelet Count 379 150 - 450 10e3/uL    % Neutrophils 57 %    % Lymphocytes 23 %    % Monocytes 14 %    % Eosinophils 5 %    % Basophils 1 %    % Immature Granulocytes 0 %    NRBCs per 100 WBC 0 <1 /100    Absolute Neutrophils 4.8 1.6 - 8.3 10e3/uL    Absolute Lymphocytes 1.9 0.8 - 5.3 10e3/uL    Absolute Monocytes 1.1 0.0 - 1.3 10e3/uL    Absolute Eosinophils 0.4 0.0 - 0.7 10e3/uL    Absolute Basophils 0.1 0.0 - 0.2 10e3/uL    Absolute Immature Granulocytes 0.0 <=0.4 10e3/uL    Absolute NRBCs 0.0 10e3/uL   CBC with platelets differential     Status: Abnormal    Narrative    The following orders were created for panel order CBC with platelets differential.  Procedure                               Abnormality         Status                     ---------                               -----------         ------                     CBC with platelets and d...[[id number]]  Abnormal            Final result                 Please view results for these tests on the individual orders.        RADIOLOGY:  Reviewed all pertinent imaging. Please see official radiology report.  Chest XR,  PA & LAT    (Results Pending)   Chest x-ray reviewed.  No obvious infiltrative disease.  Awaiting definitive report.    EKG:    Normal sinus rhythm with occasional PAC.  Patient with Q waves inferiorly and anteriorly.  No acute ST segment abnormalities.  When compared to June 12, 2023  no significant changes  I have independently reviewed and interpreted the EKG(s) documented above.          I, Jeovany Yanez, am serving as a scribe to document services personally performed by Aki Edwards MD, based on my observation and the provider's statements to me. I, Aki Edwards MD attest that Jeovany Yanez is acting in a scribe capacity, has observed my performance of the services and has documented them in accordance with my direction.    Aki Edwards M.D.  Emergency Medicine  [facility] EMERGENCY DEPARTMENT       Aki Edwards MD  08/11/23 1923       Aki Edwards MD  08/11/23 2021

## 2023-08-11 NOTE — ED NOTES
Bed: JNEDH-J  Expected date: 8/11/23  Expected time: 2:34 PM  Means of arrival: Ambulance  Comments:  SPF- 63yom hypotension, sitting- 80/60, standing 60/50

## 2023-08-11 NOTE — TELEPHONE ENCOUNTER
[facility] Call Center    Phone Message    May a detailed message be left on voicemail: yes     Reason for Call: Other: [facility] OT sent patient into ER today due to low blood pressure and very lethargic. Please call back with any questions.       Action Taken: Other: PCC    Travel Screening: Not Applicable

## 2023-08-11 NOTE — ED TRIAGE NOTES
Pt arrives via [facility].   Pt has felt lethargic and feeling lightheaded since yesterday. Today, pt's home health nurse found pt's BP to be 80 systolic while sitting and 60 systolic when standing.  No medication changes recently.   Medics placed an 18G IV in his right AC. 12lead EKG normal per medics. Given 1 liter normal saline  History of kidney transplant 1993.      Triage Assessment       Row Name 08/11/23 [id number]       Triage Assessment (Adult)    Airway WDL WDL       Respiratory WDL    Respiratory WDL WDL       Skin Circulation/Temperature WDL    Skin Circulation/Temperature WDL WDL       Cardiac WDL    Cardiac WDL WDL       Peripheral/Neurovascular WDL    Peripheral Neurovascular WDL WDL       Cognitive/Neuro/Behavioral WDL    Cognitive/Neuro/Behavioral WDL WDL

## 2023-08-11 NOTE — TELEPHONE ENCOUNTER
Red flag triage call. Hannah To w/Thi home care calls . Pt Lethargic. Weak, lihgtheaded.  Sitting bp 106/62 p 70.standing 72/56-58 p 93. Sent to ER.      Reason for Disposition    Patient sounds very sick or weak to the triager    Additional Information    Negative: SEVERE difficulty breathing (e.g., struggling for each breath, speaks in single words)    Negative: Shock suspected (e.g., cold/pale/clammy skin, too weak to stand, low BP, rapid pulse)    Negative: Difficult to awaken or acting confused (e.g., disoriented, slurred speech)    Negative: Fainted, and still feels dizzy afterwards    Negative: Overdose (accidental or intentional) of medications    Negative: New neurologic deficit that is present now: * Weakness of the face, arm, or leg on one side of the body * Numbness of the face, arm, or leg on one side of the body * Loss of speech or garbled speech    Negative: Heart beating < 50 beats per minute OR > 140 beats per minute    Negative: Sounds like a life-threatening emergency to the triager    Negative: Chest pain    Negative: Rectal bleeding, bloody stool, or tarry-black stool    Negative: Vomiting is main symptom    Negative: Diarrhea is main symptom    Negative: Headache is main symptom    Negative: Heat exhaustion suspected (i.e., dehydration from heat exposure)    Negative: Patient states that they are having an anxiety or panic attack    Negative: Dizziness from low blood sugar (i.e., < 60 mg/dl or 3.5 mmol/l)    Negative: SEVERE dizziness (e.g., unable to stand, requires support to walk, feels like passing out now)    Negative: SEVERE headache or neck pain    Negative: Spinning or tilting sensation (vertigo) present now and one or more stroke risk factors (i.e., hypertension, diabetes mellitus, prior stroke/TIA, heart attack, age over 60) (Exception: prior physician evaluation for this AND no different/worse than usual)    Negative: Neurologic deficit that was brief (now gone), ANY of the  "following:* Weakness of the face, arm, or leg on one side of the body* Numbness of the face, arm, or leg on one side of the body* Loss of speech or garbled speech    Negative: Loss of vision or double vision  (Exception: Similar to previous migraines.)    Negative: Extra heart beats OR irregular heart beating (i.e., 'palpitations')    Negative: Difficulty breathing    Negative: Drinking very little and has signs of dehydration (e.g., no urine > 12 hours, very dry mouth, very lightheaded)    Negative: Follows bleeding (e.g., stomach, rectum, vagina)  (Exception: Became dizzy from sight of small amount blood.)    Answer Assessment - Initial Assessment Questions  1. DESCRIPTION: \"Describe your dizziness.\"      Wants to put head on table  2. LIGHTHEADED: \"Do you feel lightheaded?\" (e.g., somewhat faint, woozy, weak upon standing)      Yes all of above  3. VERTIGO: \"Do you feel like either you or the room is spinning or tilting?\" (i.e. vertigo)      no  4. SEVERITY: \"How bad is it?\"  \"Do you feel like you are going to faint?\" \"Can you stand and walk?\"    - MILD: Feels slightly dizzy, but walking normally.    - MODERATE: Feels unsteady when walking, but not falling; interferes with normal activities (e.g., school, work).    - SEVERE: Unable to walk without falling, or requires assistance to walk without falling; feels like passing out now.      moderate  5. ONSET:  \"When did the dizziness begin?\"     yesterday  6. AGGRAVATING FACTORS: \"Does anything make it worse?\" (e.g., standing, change in head position)     Standing makes it worsde  7. HEART RATE: \"Can you tell me your heart rate?\" \"How many beats in 15 seconds?\"  (Note: not all patients can do this)        93 standing, 70 sitting  8. CAUSE: \"What do you think is causing the dizziness?\"      Hx anemia?  9. RECURRENT SYMPTOM: \"Have you had dizziness before?\" If Yes, ask: \"When was the last time?\" \"What happened that time?\"  no  10. OTHER SYMPTOMS: \"Do you have any other " "symptoms?\" (e.g., fever, chest pain, vomiting, diarrhea, bleeding)      nauseous  11. PREGNANCY: \"Is there any chance you are pregnant?\" \"When was your last menstrual period?\"        *No Answer*    Protocols used: DIZZINESS-A-OH      " Rhombic Flap Text: The defect edges were debeveled with a #15 scalpel blade.  Given the location of the defect and the proximity to free margins a rhombic flap was deemed most appropriate.  Using a sterile surgical marker, an appropriate rhombic flap was drawn incorporating the defect.    The area thus outlined was incised deep to adipose tissue with a #15 scalpel blade.  The skin margins were undermined to an appropriate distance in all directions utilizing iris scissors.

## 2023-08-12 NOTE — DISCHARGE INSTRUCTIONS
Drink extra sport type drinks as directed.  This will help boost your potassium and your blood pressure.

## 2023-08-14 ENCOUNTER — TELEPHONE (OUTPATIENT)
Dept: BEHAVIORAL HEALTH | Facility: CLINIC | Age: 63
End: 2023-08-14

## 2023-08-14 ENCOUNTER — VIRTUAL VISIT (OUTPATIENT)
Dept: ADDICTION MEDICINE | Facility: CLINIC | Age: 63
End: 2023-08-14
Payer: COMMERCIAL

## 2023-08-14 DIAGNOSIS — F10.20 ALCOHOL USE DISORDER, SEVERE, DEPENDENCE (H): Primary | ICD-10-CM

## 2023-08-14 DIAGNOSIS — F11.20 OPIOID TYPE DEPENDENCE, EPISODIC USE (H): ICD-10-CM

## 2023-08-14 PROCEDURE — 99204 OFFICE O/P NEW MOD 45 MIN: CPT | Mod: VID | Performed by: NURSE PRACTITIONER

## 2023-08-14 RX ORDER — NALTREXONE HYDROCHLORIDE 50 MG/1
50 TABLET, FILM COATED ORAL DAILY
Qty: 30 TABLET | Refills: 0 | Status: SHIPPED | OUTPATIENT
Start: 2023-08-14 | End: 2023-09-19

## 2023-08-14 ASSESSMENT — ANXIETY QUESTIONNAIRES
1. FEELING NERVOUS, ANXIOUS, OR ON EDGE: MORE THAN HALF THE DAYS
6. BECOMING EASILY ANNOYED OR IRRITABLE: SEVERAL DAYS
5. BEING SO RESTLESS THAT IT IS HARD TO SIT STILL: MORE THAN HALF THE DAYS
4. TROUBLE RELAXING: NEARLY EVERY DAY
3. WORRYING TOO MUCH ABOUT DIFFERENT THINGS: SEVERAL DAYS
GAD7 TOTAL SCORE: 11
GAD7 TOTAL SCORE: 11
7. FEELING AFRAID AS IF SOMETHING AWFUL MIGHT HAPPEN: SEVERAL DAYS
2. NOT BEING ABLE TO STOP OR CONTROL WORRYING: SEVERAL DAYS
IF YOU CHECKED OFF ANY PROBLEMS ON THIS QUESTIONNAIRE, HOW DIFFICULT HAVE THESE PROBLEMS MADE IT FOR YOU TO DO YOUR WORK, TAKE CARE OF THINGS AT HOME, OR GET ALONG WITH OTHER PEOPLE: SOMEWHAT DIFFICULT

## 2023-08-14 ASSESSMENT — PATIENT HEALTH QUESTIONNAIRE - PHQ9
10. IF YOU CHECKED OFF ANY PROBLEMS, HOW DIFFICULT HAVE THESE PROBLEMS MADE IT FOR YOU TO DO YOUR WORK, TAKE CARE OF THINGS AT HOME, OR GET ALONG WITH OTHER PEOPLE: VERY DIFFICULT
SUM OF ALL RESPONSES TO PHQ QUESTIONS 1-9: 17
SUM OF ALL RESPONSES TO PHQ QUESTIONS 1-9: 17

## 2023-08-14 NOTE — PATIENT INSTRUCTIONS
Patient Education   Addiction Medicine  What to Expect  Here's what to expect from our Addiction Medicine program.  About Addiction Medicine  Addiction Medicine clinics help you with substance use problems. You set your own goals. We try to help you reach your goals. Your care plan can include:  Medicine  Creating a recovery plan  Helping you find local resources  Helping with treatment options  Clinic phone number and addresses  Clinic Phone: [phone]  Mental Health and Addiction Clinic  [facility]  [address], [address]  [place] [address]  [facility]  [address], [address]  [place] [address]  Walk-in services  We offer walk-in care for patients at the Recovery [facility]. This is only for patients with Opioid Use Disorder (OUD). Anyone with OUD is welcome. Our providers will refer you to the Recovery Clinic if you're struggling to keep up with your medicines or appointments.  Rec[facility] Clinic ([facility])  [address], [address]  [place] [address]  Phone: [phone]  The Recovery Clinic is open for walk-ins Monday to Friday 9 a.m. to 11:30 a.m. and 12:30 p.m. to 3 p.m.  How it works  Come to your visits every time. The treatment works better when you do.   You can have as many visits as you need. When you're better, we'll refer you back to being cared for by your family doctor.   If you need it, we'll send you to doctors, psychiatrists, therapists, and other providers. We focus on treating addiction. We don't treat other problems, like managing other medicines or non-addiction issues.  About visits  Urine drug testing  We'll often test your pee (urine) for drugs. This is the only way we can know for sure whether or not you're using drugs. It helps us treat you without judgement.   Suboxone (buprenorphine)  If you're taking buprenorphine, you'll have a lot of visits at first. If your problem is getting worse, or you're  "using substances, we may schedule you for extra visits.   Cancelling visits  If you can't come to your visit, please call us right away at [phone]. If you don't cancel at least 24 hours (1 full day) before your visit, that's \"late cancellation.\"  Being late to visits  If you come late, you may not be seen. This will count as a \"no-show.\"  Please call the clinic if you're running late. This will help us plan, but it doesn't mean you'll be seen.   Being late is:  More than 15 minutes late for a return visit.  More than 30 minutes late for your first visit.  If you cancel late or don't show up 2 times within 6 months, we may transfer you to another clinic.   Getting help between visits  If you need help between visits, you can call us Monday to Friday from 8 a.m. to 4:30 p.m. at [phone]. You can also send us a message on [employer].  Medicine refills  If you miss or cancel a visit, you can still ask for a refill. But we can only refill your medicines if you've made a new appointment.  Please call your pharmacy for medicine refills. If you have a question about your refill, call us at [phone].  It takes up to 2 business days to refill your drugs. Let us know 2 to 3 days before you run out. Don't call more than 1 week before you run out. That's too early.   Please make sure we have your right phone number.  If we have a problem with your refill, we'll call you. If we call you, please call us back right away. If you don't, you may not get your medicines quickly.   Call your pharmacy to find out if your medicines are ready.   Keep your medicines in a safe place. Keep them away from pets and children. If your medicines are lost or stolen, we usually don't replace them. We recommend you file a police report if your medicines are stolen. Your insurance may not pay for early refills, even if you have a prescription.  Forms  Please give us at least 3 business days to fill out any forms. Bring the forms to your " visits if you can. We may refer you to other members of your care team to complete the forms.   Emergency care   Call 911 or go to the nearest emergency room if your life or someone else's life is in danger.  Call 988 anytime for the Suicide and Crisis Lifeline.  If you need care when we're closed, call your family doctor to see if they can help. You can also go to urgent care or an emergency room. [facility] emergency rooms may be able to give you buprenorphine or other medicine refills.  Thank you for choosing us for your care.  For informational purposes only. Not to replace the advice of your health care provider. Copyright   2023 [facility]. All rights reserved. [employer] [id number] - REV 05/23.

## 2023-08-14 NOTE — PROGRESS NOTES
"  Tele-Visit Details    Type of service:  Video Visit  Video Start Time: 1201  Video End Time 1233  Originating Location (pt. Location): Patient's room  Distant Location (provider location): [facility]  Reason for Televisit: COVID 19   Mode of Communication:  VideoConference via [employer]                SUBJECTIVE                                                      Jerad Ross is a 63 year old male who presents for  initial visit for addiction consultation and management referred by  Genia Fraser CNP due to concerns for Alcohol Use Disorder (AUD)      HPI:   Jerad Ross is a 63 year old male with history of HTN, JULIANE, , CAD s/p CABG x3 9/2020, depression with anxiety, and alcohol use who presents for further evaluation of possible substance use disorder and management options.    Jerad  is requesting a chemical health assessment to see if he \"should go to treatment of if AA is enough\". He reports that he began drinking 8 shots of whiskey 1-3 times weekly for past 4 months. States his depression and anxiety as well as opiate withdrawal triggered use escalating use. Has been prescribed opioid pain medication on/off since 3/2023 due to right hip pain 2/2 avascular necrosis which was revised 6/2023 and admits to taking more than prescribed on occasion and states that he realized he was taking them sometimes due to his emotions versus physical pain. He has not taken an opioid pain medication for past 3 weeks. States if he ever requires opioid pain medication in future, prefers to be tapered off.     He had a period of 4 years [id number] of total abstinence from alcohol States he went to treatment for \"sexual compulsivity\" followed by a \"sober house\" and he just maintained abstinence after leaving sober house. States he went to sober house because he needed somewhere to go after leaving the [facility]. He is attending a 12 step group for his sexual compulsions. Has recently started " "attending AA as well.     Remote history of renal failure as an adolescent. Was on dialysis at age 16, and s/p kidney transplant at age 18. States he contracted hepatitis B from dialysis,     Substance Use History:   \"Have you ever had any history with [...] use?\" And \"When was your last use?  ALCOHOL - Drinks 8 shots of whiskey 1-3 times weekly for past 4 months  CANNABIS - Denies  PRESCRIPTION STIMULANTS (includes Ritalin, Adderall, Vyvanse) - Denies  COCAINE/CRACK - Denies  METH/AMPHETAMINES (includes ecstacy, MDMA/connie) - Denies  OPIATES - Prescribed opiate pain medication on/off since 3/2023 due to right hip pain with revision 6/19/2023. States he took as prescribed mostly, but does admit to taking more than  prescribed on a few occasions. He needs to be tapered off of opioids in future as he did experience opiate w/d and began drinking to help with symptoms. No longer has prescription for opioids.   BENZODIAZEPINES (includes Ativan, Klonopin, Xanax) - Denies  KRATOM (mild opioid and stimulant effects) - Denies  KETAMINE - Denies  HALLUCINOGENS (includes DXM) - Denies  BEHAVIORAL (Gambling, Eating d/o, Compulsivity) - Sexual compulsivirty  History of treatment - Yes the [facility], \"sober house\", and 12 step  NICOTINE  Cigarettes: Denies        Previous withdrawal treatment episodes (e.g. detox): Denies  Previous [facility] treatment programs: Denies  Hospitalizations or overdose: Denies  Medical complications from substance use: Hx of  renal failure on dialysis at age 16, and kidney transplant, infected with hepatitis B from dialysis. Alcohol impacts his liver.   IV Drug use?: Denies  Previous Medication for Addiction Tx: Denies  Longest period of full abstinence: Almost 4 years [id number]  Activities that have previously supported abstinence: Some AA  Current Recovery Activities: AA    DSM5 Substance Use Disorder Criteria (2-3=mild, 4-5=moderate, 6+=severe):  Substance is often taken in larger amounts OR over a longer " period than was intended: Yes  There is a persistent desire OR unsuccessful efforts to cut down or control substance use: Yes  A great deal of time is spent in activities necessary to obtain the substance, use the substance, or recover from its effects: No  Craving, or a strong desire to use substances: Yes  Recurrent substance use resulting in a failure to fulfil major obligations at work, school, or home: No  Continued substance use despite having persistent or recurrent social or interpersonal problems caused or exacerbated by the effects of the substance: Yes, sharing with family and friends and they are now concerned.   Important social, occupational, or recreational activities are given up or reduced because of the substance: No  Recurrent substance use in situations in which it is physically hazardous: Yes, increased fall risk  Substance use is continued despite knowledge of having a persistent or recurrent physical or psychological problem that is likely to have been caused or exacerbated by the substance: Yes  Tolerance, as defined by either of the following: a) a need for markedly increased amounts of the substance to achieve intoxication or desired effect. b) a markedly diminished effect with continued use of the same amount of the substance: No  Withdrawal, as manifested by either of the following: a) the characteristic withdrawal syndrome. b) substance (or a closely-related substance) is taken to relieve or avoid withdrawal symptoms: Yes      Infectious disease screening  Hep C:    Hepatitis C Antibody   Date Value Ref Range Status   12/18/2008 Negative NEG Final     Hepatitis B DNA <20 7/19/2022, not detected in 4/2021    HIV:  None on file        Psychiatric History (per patient report and problem list review)  Past diagnoses - Depression and anxiety  Current or past psychiatrist: Genia Small  Current or past therapist:  Scheduled to start individual therapy later this  month  Hospitalizations/TMS/ECT - Denies  Suicide Attempts - Struggling a lot passive SI, denies plan or intent  Medication trials - Taking paxil      PHQ-9 scores:      6/12/2023     9:13 AM 6/15/2023    11:20 AM 8/14/2023    11:15 AM   PHQ   PHQ-9 Total Score 15 12 17   Q9: Thoughts of better off dead/self-harm past 2 weeks Several days More than half the days More than half the days   F/U: Thoughts of suicide or self-harm Yes  No   F/U: Self harm-plan Yes     F/U: Self-harm action No     F/U: Safety concerns Yes  No     KIM-7 scores:      5/3/2023     7:38 AM 6/12/2023     9:14 AM 8/14/2023    11:17 AM   KIM-7 SCORE   Total Score 10 (moderate anxiety) 10 (moderate anxiety) 11 (moderate anxiety)   Total Score 10 10 11         SOCIAL HISTORY:  Housing status: with roommate  Employment status: Disabled  Relationship status:   Children: None  Legal concerns related to use: Denies  Contact information up to date? Yes        Medical History:    Patient Active Problem List    Diagnosis Date Noted    Alcohol use disorder, severe, dependence (H) 08/14/2023     Priority: Medium    Transient hypotension 08/11/2023     Priority: Medium    Hypokalemia 08/11/2023     Priority: Medium    Generalized anxiety disorder 07/26/2023     Priority: Medium    Generalized muscle weakness 06/27/2023     Priority: Medium    Failed total hip arthroplasty, sequela 06/19/2023     Priority: Medium    Preoperative examination 06/15/2023     Priority: Medium    Coronary artery disease of native artery of native heart with stable angina pectoris (H) 06/15/2023     Priority: Medium    Chest pain, Probably chest wall in origin 06/12/2023     Priority: Medium    Avascular necrosis of bones of both hips (H) 04/18/2023     Priority: Medium    Hip pain, right 04/08/2023     Priority: Medium    HTN (hypertension) 11/29/2022     Priority: Medium    End stage renal disease (H) 11/29/2022     Priority: Medium    Abnormal cardiovascular stress test  09/12/2021     Priority: Medium    CAD -- S/P CABG x 3 in 2020 07/26/2021     Priority: Medium    Acute midline low back pain without sciatica 11/04/2018     Priority: Medium    Septic bursitis 11/04/2018     Priority: Medium    Impaired mobility 11/04/2018     Priority: Medium    Aftercare following organ transplant 06/19/2017     Priority: Medium    Immunosuppression (H)      Priority: Medium    Hypogonadism in male      Priority: Medium    GERD (gastroesophageal reflux disease)      Priority: Medium    Chronic hepatitis B (H) 07/19/2016     Priority: Medium    Inflamed seborrheic keratosis 01/23/2016     Priority: Medium    Intertrigo 01/23/2016     Priority: Medium    Hip pain 06/19/2015     Priority: Medium    Depression      Priority: Medium    Diverticulosis      Priority: Medium    Hemorrhoids      Priority: Medium    Kidney replaced by transplant      Priority: Medium    Basal cell carcinoma      Priority: Medium    Squamous cell carcinoma      Priority: Medium    Dyslipidemia      Priority: Medium    CRP elevated      Priority: Medium    Glaucoma      Priority: Medium     OHTN      AION (acute ischemic optic neuropathy)      Priority: Medium    Paracentral scotoma      Priority: Medium     LE      Coronary arteriosclerosis due to lipid rich plaque 06/17/2014     Priority: Medium     Coronary angiogram 6/17/14: Severe distal 3-vessel disease involving small vessels, not amenable to PCI or CABG.      NSTEMI (non-ST elevated myocardial infarction) (H) 06/17/2014     Priority: Medium     6/17/14: Coronary angiogram showed diffuse 3-vessel disease involving small distal vessels, not amenable to revascularization. Started on medical therapy.       JULIANE (obstructive sleep apnea)      Priority: Medium    HTN, kidney transplant related      Priority: Medium    BCC (basal cell carcinoma), trunk 05/02/2012     Priority: Medium       Past Medical History:   Diagnosis Date    Acute midline low back pain without sciatica      AION (acute ischemic optic neuropathy)     Anemia in chronic renal disease     Arthritis     Avascular necrosis of bones of both hips (H)     s/p bilateral hip replacements    Avascular necrosis of bones of both hips (H)     s/p bilateral hip replacements    Basal cell carcinoma     Chronic hepatitis B (H)     Clostridium difficile colitis     COPD (chronic obstructive pulmonary disease) (H)     Coronary artery disease involving native coronary artery of native heart without angina pectoris 06/17/2014    Coronary angiogram 6/17/14: Severe distal 3-vessel disease involving small vessels, not amenable to PCI or CABG.    CRP elevated     Depression     Depressive disorder     Diverticulosis     Dyslipidemia     Elevated C-reactive protein (CRP)     FSGS (focal segmental glomerulosclerosis)     FSGS (focal segmental glomerulosclerosis)     Gastric ulcer with hemorrhage 02/12/2012    Gastric ulcer with hemorrhage 02/12/2012    GERD (gastroesophageal reflux disease)     Glaucoma     OHTN    Glaucoma     Hemorrhoids     Hemorrhoids     History of blood transfusion     HTN (hypertension)     Hyperlipidemia     Hypertension secondary to other renal disorders     Hypogonadism in male     Immunosuppressed status (H)     Kidney replaced by transplant     focal glomerulosclerosis     Kidney transplanted     focal glomerulosclerosis     NSTEMI (non-ST elevated myocardial infarction) (H) 06/17/2014    NSTEMI (non-ST elevated myocardial infarction) (H) 06/17/2014    JULIANE (obstructive sleep apnea)     Doesn't use CPAP    Paracentral scotoma     LE    Secondary renal hyperparathyroidism (H)     Secondary renal hyperparathyroidism (H)     Septic bursitis     Squamous cell carcinoma     Uncomplicated asthma        Past Surgical History:   Procedure Laterality Date    APPENDECTOMY      ARTHROPLASTY REVISION HIP Right 6/19/2023    Procedure: RIGHT HIP REVISION;  Surgeon: Juan Mcdonough MD;  Location: [facility] OR    BIOPSY      Cardiac  Bypass surgery  06/08/2020    [facility]     CATARACT EXTRACTION EXTRACAPSULAR W/ INTRAOCULAR LENS IMPLANTATION Bilateral 4-20-10, 6-1-10    CATARACT IOL, RT/LT  04/19/2000    RE    CATARACT IOL, RT/LT  06/01/2000    LE    COLECTOMY PARTIAL  01/01/1983     10 cm, diverticulitis     COLECTOMY SUBTOTAL  1983    10 cm, diverticulitis    COLONOSCOPY  02/13/2012    Procedure:COLONOSCOPY; Surgeon:SLOAN GALLARDO; Location: [facility]    COLONOSCOPY N/A 01/22/2020    Procedure: Colonoscopy, With Polypectomy And Biopsy;  Surgeon: Aston Kiran MD;  Location: [facility]    CV CORONARY ANGIOGRAM N/A 06/02/2020    Procedure: Coronary Angiogram;  Surgeon: Alona Florentino MD;  Location: [facility];  Service: Cardiology    CV CORONARY ANGIOGRAM N/A 09/20/2021    Procedure: Coronary Angiogram;  Surgeon: Adam Agudelo MD;  Location: [facility] CV    CV LEFT HEART CATHETERIZATION WITHOUT LEFT VENTRICULOGRAM Left 06/02/2020    Procedure: Left Heart Catheterization Without Left Ventriculogram;  Surgeon: Alona Florentino MD;  Location: [facility];  Service: Cardiology    CV SUPRAVALVULAR AORTOGRAM N/A 09/20/2021    Procedure: Supravalvular Aortagram;  Surgeon: Adam Agudelo MD;  Location: [facility] CV    ESOPHAGOSCOPY, GASTROSCOPY, DUODENOSCOPY (EGD), COMBINED  02/13/2012    Procedure:COMBINED ESOPHAGOSCOPY, GASTROSCOPY, DUODENOSCOPY (EGD); Surgeon:SLOAN GALLARDO; Location: [facility]    ESOPHAGOSCOPY, GASTROSCOPY, DUODENOSCOPY (EGD), COMBINED  02/13/2012    EXTRACAPSULAR CATARACT EXTRATION WITH INTRAOCULAR LENS IMPLANT  4-20-10, 6-1-10    Rt, Lt    HERNIA REPAIR  1995    Lt inguinal    HERNIA REPAIR  01/01/1995    Lt inguinal    HIP SURGERY      1981, bilat MITZI, revised 2001, 2005    HIP SURGERY  01/01/1981    bilat MITZI, revised 2001, 2005    IR FINE NEEDLE ASPIRATION W ULTRASOUND  04/10/2023    KIDNEY SURGERY  1978 and 1993    transplant    KNEE SURGERY  1983, 1987     bilat TKA    MOHS MICROGRAPHIC PROCEDURE      MOHS MICROGRAPHIC PROCEDURE      ORTHOPEDIC SURGERY      OTHER SURGICAL HISTORY      kidney [id number], 1993    PHACOEMULSIFICATION CLEAR CORNEA WITH STANDARD INTRAOCULAR LENS IMPLANT Right 04/19/2000    PHACOEMULSIFICATION CLEAR CORNEA WITH STANDARD INTRAOCULAR LENS IMPLANT Left 06/01/2000    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, auxiliary spleen    TONSILLECTOMY      TRANSPLANT      VASCULAR SURGERY  1976         Family History   Problem Relation Age of Onset    Cardiovascular Father         AI with valve repair    Hypertension Father     Kidney Disease Father     Other - See Comments Father         AI with valve repair    Cerebrovascular Disease Maternal Grandfather     Other - See Comments Maternal Grandfather         cerebrovascular disease    Cancer Paternal Grandmother         ovarian ca    Ovarian Cancer Paternal Grandmother     Cerebrovascular Disease Paternal Grandfather     Kidney Disease Paternal Aunt     Kidney Disease Paternal Aunt     Skin Cancer No family hx of     Glaucoma No family hx of     Macular Degeneration No family hx of     Amblyopia No family hx of     Melanoma No family hx of     Diabetes No family hx of          Current Outpatient Medications   Medication Sig Dispense Refill    naltrexone (DEPADE/REVIA) 50 MG tablet Take 1 tablet (50 mg) by mouth daily Take 1/2 tablet (25 mg) daily for 1 week, then take 1 tablet (50 mg ) daily. 30 tablet 0    acetaminophen (TYLENOL) 500 MG tablet Take 2 tablets (1,000 mg) by mouth 3 times daily 1 tablet 0    albuterol (PROAIR HFA) 108 (90 Base) MCG/ACT inhaler Inhale 2 puffs into the lungs every 6 hours as needed for shortness of breath / dyspnea or wheezing 1 g 11    aspirin 81 MG EC tablet Resume usual dose of aspirin after finishing increased dose prescribed after surgery      aspirin 81 MG EC tablet Take 1 tablet (81 mg) by mouth 2 times daily 60 tablet 0    budesonide (PULMICORT FLEXHALER) 90 MCG/ACT  inhaler Inhale 2 puffs into the lungs 2 times daily 3 each 2    calcium citrate-vitamin D (CITRACAL) 315-200 MG-UNIT TABS Take 1 tablet by mouth daily.      entecavir (BARACLUDE) 0.5 MG tablet Take 1 tablet (0.5 mg) by mouth daily 90 tablet 1    FLUoxetine (PROZAC) 20 MG capsule Take 1 capsule (20 mg) by mouth daily With 40mg for a total daily dose of 60mg 30 capsule 1    FLUoxetine (PROZAC) 40 MG capsule Take 1 capsule (40 mg) by mouth daily 90 capsule 2    fluticasone-salmeterol (ADVAIR) 250-50 MCG/ACT inhaler Inhale 1 puff into the lungs 2 times daily as needed (PRN) 180 each 3    furosemide (LASIX) 20 MG tablet Take 1 tablet (20 mg) by mouth daily as needed (fluid retention) 90 tablet 1    hydrOXYzine (ATARAX) 10 MG tablet Take 1 tablet (10 mg) by mouth every 8 hours as needed for anxiety 60 tablet 1    isosorbide mononitrate (IMDUR) 60 MG 24 hr tablet Take 60 mg by mouth daily Start taking when 30mg tablets gone      latanoprost (XALATAN) 0.005 % ophthalmic solution Place 1 drop into both eyes At Bedtime 2.5 mL 11    Lidocaine (LIDOCARE) 4 % Patch Place 1 patch onto the skin daily as needed for moderate pain To prevent lidocaine toxicity, patient should be patch free for 12 hrs daily.      metoprolol succinate ER (TOPROL XL) 25 MG 24 hr tablet Take 0.5 tablets (12.5 mg) by mouth daily 15 tablet 0    Multiple Vitamins-Iron (ONE DAILY MULTIVITAMIN/IRON) TABS Take 1 tablet by mouth daily      mycophenolate (GENERIC EQUIVALENT) 250 MG capsule Take 3 capsules (750 mg) by mouth 2 times daily 540 capsule 3    nitroGLYcerin (NITROSTAT) 0.4 MG sublingual tablet Place 1 tablet (0.4 mg) under the tongue every 5 minutes as needed for chest pain 20 tablet 3    Omega-3 Fatty Acids (OMEGA 3 PO) Take 1 capsule by mouth daily Dose unknown      pantoprazole (PROTONIX) 40 MG EC tablet Take 1 tablet (40 mg) by mouth daily 90 tablet 3    polyethylene glycol (MIRALAX) 17 g packet Take 17 g by mouth daily as needed       potassium  chloride ER (KLOR-CON) 8 MEQ CR tablet Take 1 tablet (8 mEq) by mouth 2 times daily 30 tablet 1    predniSONE (DELTASONE) 5 MG tablet Take 1 tablet (5 mg) by mouth daily 90 tablet 3    rosuvastatin (CRESTOR) 20 MG tablet TAKE 1 TABLET(20 MG) BY MOUTH DAILY 90 tablet 3    tacrolimus (PROTOPIC) 0.1 % external ointment Apply topically 2 times daily as needed           Allergies   Allergen Reactions    Penicillins Shortness Of Breath and Hives    Keflex [Cephalexin Hcl] Unknown     Patient could not recall reaction    Sulfa Antibiotics Rash    Tetracycline Unknown     Patient could not recall reaction           OBJECTIVE                                                      EXAM    There were no vitals taken for this visit.    GENERAL: healthy, alert, no distress, appears older than stated age, and disheveled  EYES: Eyes grossly normal to inspection, PERRL and conjunctivae and sclerae normal  RESP: No respiratory distress  MENTAL STATUS EXAM  Appearance/Behavior: No appearant distress  Speech: Normal  Mood/Affect: normal affect  Insight: Adequate      LAB  Recent UDS Labs (may not contain today's lab data)  Lab Results   Component Value Date    TEMP 37.0 06/09/2020       Hepatic Function  AST   Date Value Ref Range Status   08/11/2023 38 0 - 45 U/L Final     Comment:     Specimen is hemolyzed which can falsely elevate AST. Analysis of a non-hemolyzed specimen may result in a lower value.  Reference intervals for this test were updated on 6/12/2023 to more accurately reflect our healthy population. There may be differences in the flagging of prior results with similar values performed with this method. Interpretation of those prior results can be made in the context of the updated reference intervals.   04/12/2021 20 0 - 45 U/L Final     ALT   Date Value Ref Range Status   08/11/2023 10 0 - 70 U/L Final     Comment:     Reference intervals for this test were updated on 6/12/2023 to more accurately reflect our healthy  population. There may be differences in the flagging of prior results with similar values performed with this method. Interpretation of those prior results can be made in the context of the updated reference intervals.     04/12/2021 30 0 - 70 U/L Final     Bilirubin Total   Date Value Ref Range Status   08/11/2023 0.7 <=1.2 mg/dL Final   04/12/2021 0.5 0.2 - 1.3 mg/dL Final     Albumin   Date Value Ref Range Status   08/11/2023 3.1 (L) 3.5 - 5.2 g/dL Final   07/19/2022 3.5 3.4 - 5.0 g/dL Final   04/12/2021 3.1 (L) 3.4 - 5.0 g/dL Final     INR   Date Value Ref Range Status   07/19/2022 1.04 0.85 - 1.15 Final   04/12/2021 1.03 0.86 - 1.14 Final       CBC  WBC   Date Value Ref Range Status   04/12/2021 9.9 4.0 - 11.0 10e9/L Final     WBC Count   Date Value Ref Range Status   08/11/2023 8.4 4.0 - 11.0 10e3/uL Final     RBC Count   Date Value Ref Range Status   08/11/2023 4.13 (L) 4.40 - 5.90 10e6/uL Final   04/12/2021 5.13 4.4 - 5.9 10e12/L Final     Hemoglobin   Date Value Ref Range Status   08/11/2023 11.4 (L) 13.3 - 17.7 g/dL Final   04/12/2021 14.0 13.3 - 17.7 g/dL Final     Hematocrit   Date Value Ref Range Status   08/11/2023 36.9 (L) 40.0 - 53.0 % Final   04/12/2021 46.0 40.0 - 53.0 % Final     MCV   Date Value Ref Range Status   08/11/2023 89 78 - 100 fL Final   04/12/2021 90 78 - 100 fl Final     MCH   Date Value Ref Range Status   08/11/2023 27.6 26.5 - 33.0 pg Final   04/12/2021 27.3 26.5 - 33.0 pg Final     MCHC   Date Value Ref Range Status   08/11/2023 30.9 (L) 31.5 - 36.5 g/dL Final   04/12/2021 30.4 (L) 31.5 - 36.5 g/dL Final     Platelet Count   Date Value Ref Range Status   08/11/2023 379 150 - 450 10e3/uL Final   04/12/2021 310 150 - 450 10e9/L Final     RDW   Date Value Ref Range Status   08/11/2023 15.9 (H) 10.0 - 15.0 % Final   04/12/2021 16.0 (H) 10.0 - 15.0 % Final       Today's lab data  No results found for any visits on 08/14/23.        [facility] Board of Pharmacy Data Base Reviewed;     Is Consistent with patient reports and Epic records.                 07/07/2023 06/29/2023 2 Oxycodone Hcl (Ir) 5 Mg Tablet 28.00 2 Ri Mas [id number] Ali (1191) 0/0 105.00 MME Other [place]   06/29/2023 06/29/2023 2 Oxycodone Hcl (Ir) 5 Mg Tablet 30.00 2 Ri Mas [id number] Ali (1191) 0/0 112.50 MME Other [place]   06/23/2023 06/23/2023 2 Oxycodone Hcl (Ir) 5 Mg Tablet 18.00 2 Ri Mas [id number] Ali (1191) 0/0 67.50 MME Other [place]   06/22/2023 06/22/2023 2 Oxycodone Hcl (Ir) 5 Mg Tablet 18.00 2 Tr Bur [id number] Ali (1191) 0/0 67.50 MME Other [place]   03/16/2023 03/15/2023 1 Oxycodone Hcl (Ir) 5 Mg Tablet 21.00 7 Yeny Miry [id number] Mandi ([id number])                  A/P                                                      ASSESSMENT/PLAN:  1. Alcohol use disorder, severe, dependence (H)  63 year old male h/o of AUD, drinking 8 shots up to 3 times weekly who is seeking help to abstain from alcohol use. Is wanting a lashanda eval for treatment options   -Referral placed for lashanda eval and contact information provided. Is interested in the [facility] for Medicare.   -Discussed pharmacotherapy today. Reports history of taking naltrexone in past to help with his sexual compulsions, and he is interested in trial for AUD. Start naltrexone, titrate to 50 mg TDD.  -Encouraged to continue to attend AA.  -Encouraged plans to start individual therapy later this month.   -Continue to follow up with psychiatry.   - Adult Mental Health  Referral; Future  - naltrexone (DEPADE/REVIA) 50 MG tablet; Take 1 tablet (50 mg) by mouth daily Take 1/2 tablet (25 mg) daily for 1 week, then take 1 tablet (50 mg ) daily.  Dispense: 30 tablet; Refill: 0    2. Opioid type dependence, episodic use (H)  Has been prescribed oxycodone 5-10 mg every 4 hours prn on and off since 3/2023. Is s/p right hip revision 6/2023 which required TCU stay. Has not had any opioid for past 3 weeks. Feels he definitely has physical dependence. He admits to taking more than prescribed or more to treat  emotional pain than physical pain in past.   -Encouraged to let future provider prescribing opioids that he prefers to be taper off in future due to experiencing opiate withdrawal which triggered his alcohol use.           ENCOUNTER FOR LONG TERM USE OF HIGH RISK MEDICATION   High Risk Drug Monitoring?  YES   Drug being monitored: Naltrexone   Reason for drug: Alcohol  Use Disorder   What is being monitored?: Dosage, Cravings, Triggers and side effects       Counseled the patient on the importance of having a recovery program in addition to medication to manage recovery.  Components include avoiding isolating, having willingness to change, avoiding triggers and managing cravings. Encouraged having some type of sober network and practicing honesty with trusted support person(s). Encouraged other services such as counseling, 12 step or other self-help organizations.      Opioid warning reviewed.  Risk of overdose following a period of abstinence due to decrease tolerance was discussed including risk of death.  Strongly recommended abstain from alcohol, benzodiazepines, THC, opioids and other drugs of abuse.  Increased risk of return to opioid use after use of these substances discussed.  Increased risk of overdose/death with use of other substances particularly benzodiazepines/alcohol reviewed.      RTC   2 weeks                Sarahy Mullins CNP  [facility]  [phone]                  Answers submitted by the patient for this visit:  Patient Health Questionnaire (Submitted on 8/14/2023)  If you checked off any problems, how difficult have these problems made it for you to do your work, take care of things at home, or get along with other people?: Very difficult  PHQ9 TOTAL SCORE: 17  KIM-7 (Submitted on 8/14/2023)  KIM 7 TOTAL SCORE: 11

## 2023-08-14 NOTE — TELEPHONE ENCOUNTER
Pt is a(n) adult (20+ out of HS) Seeking as eval for Adult ROBERT Assessment for evaluation and recommendations..  Appointment scheduled by:  Patient.  (self-pay - complete Cost Estimate)  Caller name:  Patient    Caller phone #: Yes  Legal Guardianship Reviewed?  No  Honoring Choices Notified?  No  Brief reason for appt:  Listed above     needed?  NO    Contact information verified/updated: Yes    Yovani Owusu

## 2023-08-14 NOTE — NURSING NOTE
Is the patient currently in the state of [place]? YES - at home.    Visit mode:VIDEO    If the visit is dropped, the patient can be reconnected by: VIDEO VISIT: Text to cell phone:   Telephone Information:   Mobile [phone]       Will anyone else be joining the visit? No  (If patient encounters technical issues they should call [phone])    How would you like to obtain your AVS? MyChart    Are changes needed to the allergy or medication list? No    Rooming Documentation: Attendance Guidelines - Care team has reviewed attendance agreement with patient. Patient advised that two failed appointments within 6 months may lead to termination of current episode of care.        Reason for visit: KURT Swan, VVF

## 2023-08-15 ENCOUNTER — TRANSFERRED RECORDS (OUTPATIENT)
Dept: HEALTH INFORMATION MANAGEMENT | Facility: CLINIC | Age: 63
End: 2023-08-15

## 2023-08-15 ENCOUNTER — TELEPHONE (OUTPATIENT)
Dept: PSYCHIATRY | Facility: CLINIC | Age: 63
End: 2023-08-15

## 2023-08-15 LAB
ATRIAL RATE - MUSE: 82 BPM
DIASTOLIC BLOOD PRESSURE - MUSE: NORMAL MMHG
INTERPRETATION ECG - MUSE: NORMAL
P AXIS - MUSE: 48 DEGREES
PR INTERVAL - MUSE: 162 MS
QRS DURATION - MUSE: 82 MS
QT - MUSE: 432 MS
QTC - MUSE: 504 MS
R AXIS - MUSE: -1 DEGREES
SYSTOLIC BLOOD PRESSURE - MUSE: NORMAL MMHG
T AXIS - MUSE: 107 DEGREES
VENTRICULAR RATE- MUSE: 82 BPM

## 2023-08-15 NOTE — TELEPHONE ENCOUNTER
Writer reached out to patient for a weekly check-in as requested by provider. No answer at number provided. LVM, requesting a call back. Clinic number provided.

## 2023-08-16 DIAGNOSIS — Z48.298 AFTERCARE FOLLOWING ORGAN TRANSPLANT: Primary | ICD-10-CM

## 2023-08-16 DIAGNOSIS — Z94.0 KIDNEY TRANSPLANTED: ICD-10-CM

## 2023-08-16 NOTE — TELEPHONE ENCOUNTER
Second attempt made to reach out to patient to connect on a weekly call. No answer at number provided. LVM, requesting a call back. Clinic number provided.

## 2023-08-17 DIAGNOSIS — Z94.0 KIDNEY TRANSPLANTED: Primary | ICD-10-CM

## 2023-08-20 ENCOUNTER — APPOINTMENT (OUTPATIENT)
Dept: ULTRASOUND IMAGING | Facility: HOSPITAL | Age: 63
DRG: 299 | End: 2023-08-20
Attending: FAMILY MEDICINE
Payer: COMMERCIAL

## 2023-08-20 ENCOUNTER — HOSPITAL ENCOUNTER (INPATIENT)
Facility: HOSPITAL | Age: 63
LOS: 2 days | Discharge: HOME HEALTH | DRG: 299 | End: 2023-08-22
Attending: EMERGENCY MEDICINE | Admitting: FAMILY MEDICINE
Payer: COMMERCIAL

## 2023-08-20 ENCOUNTER — APPOINTMENT (OUTPATIENT)
Dept: CT IMAGING | Facility: HOSPITAL | Age: 63
DRG: 299 | End: 2023-08-20
Attending: EMERGENCY MEDICINE
Payer: COMMERCIAL

## 2023-08-20 DIAGNOSIS — I26.94 MULTIPLE SUBSEGMENTAL PULMONARY EMBOLI WITHOUT ACUTE COR PULMONALE (H): ICD-10-CM

## 2023-08-20 DIAGNOSIS — R09.02 HYPOXIA: ICD-10-CM

## 2023-08-20 DIAGNOSIS — R06.2 WHEEZING: ICD-10-CM

## 2023-08-20 LAB
ALBUMIN SERPL BCG-MCNC: 3.4 G/DL (ref 3.5–5.2)
ALBUMIN UR-MCNC: 20 MG/DL
ALP SERPL-CCNC: 64 U/L (ref 40–129)
ALT SERPL W P-5'-P-CCNC: 9 U/L (ref 0–70)
AMORPH CRY #/AREA URNS HPF: ABNORMAL /HPF
ANION GAP SERPL CALCULATED.3IONS-SCNC: 16 MMOL/L (ref 7–15)
APPEARANCE UR: CLEAR
AST SERPL W P-5'-P-CCNC: 23 U/L (ref 0–45)
BILIRUB DIRECT SERPL-MCNC: 0.25 MG/DL (ref 0–0.3)
BILIRUB SERPL-MCNC: 0.9 MG/DL
BILIRUB UR QL STRIP: NEGATIVE
BUN SERPL-MCNC: 6.5 MG/DL (ref 8–23)
CALCIUM SERPL-MCNC: 9.5 MG/DL (ref 8.8–10.2)
CHLORIDE SERPL-SCNC: 99 MMOL/L (ref 98–107)
COLOR UR AUTO: ABNORMAL
CREAT SERPL-MCNC: 0.7 MG/DL (ref 0.67–1.17)
DEPRECATED HCO3 PLAS-SCNC: 24 MMOL/L (ref 22–29)
ERYTHROCYTE [DISTWIDTH] IN BLOOD BY AUTOMATED COUNT: 15.6 % (ref 10–15)
ETHANOL SERPL-MCNC: <0.01 G/DL
GFR SERPL CREATININE-BSD FRML MDRD: >90 ML/MIN/1.73M2
GLUCOSE SERPL-MCNC: 77 MG/DL (ref 70–99)
GLUCOSE UR STRIP-MCNC: NEGATIVE MG/DL
HCT VFR BLD AUTO: 41.3 % (ref 40–53)
HGB BLD-MCNC: 12.5 G/DL (ref 13.3–17.7)
HGB UR QL STRIP: NEGATIVE
HOLD SPECIMEN: NORMAL
HYALINE CASTS: 10 /LPF
KETONES UR STRIP-MCNC: 60 MG/DL
LEUKOCYTE ESTERASE UR QL STRIP: NEGATIVE
MAGNESIUM SERPL-MCNC: 1.4 MG/DL (ref 1.7–2.3)
MCH RBC QN AUTO: 27.1 PG (ref 26.5–33)
MCHC RBC AUTO-ENTMCNC: 30.3 G/DL (ref 31.5–36.5)
MCV RBC AUTO: 89 FL (ref 78–100)
MUCOUS THREADS #/AREA URNS LPF: PRESENT /LPF
NITRATE UR QL: NEGATIVE
PH UR STRIP: 6 [PH] (ref 5–7)
PLATELET # BLD AUTO: 405 10E3/UL (ref 150–450)
POTASSIUM SERPL-SCNC: 3.7 MMOL/L (ref 3.4–5.3)
PROCALCITONIN SERPL IA-MCNC: 0.06 NG/ML
PROT SERPL-MCNC: 6.2 G/DL (ref 6.4–8.3)
RADIOLOGIST FLAGS: ABNORMAL
RBC # BLD AUTO: 4.62 10E6/UL (ref 4.4–5.9)
RBC URINE: <1 /HPF
SARS-COV-2 RNA RESP QL NAA+PROBE: NEGATIVE
SODIUM SERPL-SCNC: 139 MMOL/L (ref 136–145)
SP GR UR STRIP: 1.01 (ref 1–1.03)
SQUAMOUS EPITHELIAL: <1 /HPF
TSH SERPL DL<=0.005 MIU/L-ACNC: 5.33 UIU/ML (ref 0.3–4.2)
UROBILINOGEN UR STRIP-MCNC: <2 MG/DL
WAXY CAST: 3 /LPF
WBC # BLD AUTO: 8.4 10E3/UL (ref 4–11)
WBC URINE: 1 /HPF

## 2023-08-20 PROCEDURE — 99285 EMERGENCY DEPT VISIT HI MDM: CPT | Mod: 25

## 2023-08-20 PROCEDURE — 82077 ASSAY SPEC XCP UR&BREATH IA: CPT | Performed by: EMERGENCY MEDICINE

## 2023-08-20 PROCEDURE — 99222 1ST HOSP IP/OBS MODERATE 55: CPT | Performed by: FAMILY MEDICINE

## 2023-08-20 PROCEDURE — 81001 URINALYSIS AUTO W/SCOPE: CPT | Performed by: EMERGENCY MEDICINE

## 2023-08-20 PROCEDURE — 96376 TX/PRO/DX INJ SAME DRUG ADON: CPT

## 2023-08-20 PROCEDURE — 120N000001 HC R&B MED SURG/OB

## 2023-08-20 PROCEDURE — 93005 ELECTROCARDIOGRAM TRACING: CPT | Performed by: EMERGENCY MEDICINE

## 2023-08-20 PROCEDURE — 96361 HYDRATE IV INFUSION ADD-ON: CPT

## 2023-08-20 PROCEDURE — 258N000003 HC RX IP 258 OP 636: Performed by: EMERGENCY MEDICINE

## 2023-08-20 PROCEDURE — 84443 ASSAY THYROID STIM HORMONE: CPT | Performed by: EMERGENCY MEDICINE

## 2023-08-20 PROCEDURE — 80053 COMPREHEN METABOLIC PANEL: CPT | Performed by: EMERGENCY MEDICINE

## 2023-08-20 PROCEDURE — 85027 COMPLETE CBC AUTOMATED: CPT | Performed by: EMERGENCY MEDICINE

## 2023-08-20 PROCEDURE — 250N000013 HC RX MED GY IP 250 OP 250 PS 637: Performed by: FAMILY MEDICINE

## 2023-08-20 PROCEDURE — 83735 ASSAY OF MAGNESIUM: CPT | Performed by: EMERGENCY MEDICINE

## 2023-08-20 PROCEDURE — 250N000012 HC RX MED GY IP 250 OP 636 PS 637: Performed by: FAMILY MEDICINE

## 2023-08-20 PROCEDURE — 36415 COLL VENOUS BLD VENIPUNCTURE: CPT | Performed by: EMERGENCY MEDICINE

## 2023-08-20 PROCEDURE — 87635 SARS-COV-2 COVID-19 AMP PRB: CPT | Performed by: FAMILY MEDICINE

## 2023-08-20 PROCEDURE — 96365 THER/PROPH/DIAG IV INF INIT: CPT | Mod: 59

## 2023-08-20 PROCEDURE — 250N000011 HC RX IP 250 OP 636: Mod: JZ | Performed by: EMERGENCY MEDICINE

## 2023-08-20 PROCEDURE — 96375 TX/PRO/DX INJ NEW DRUG ADDON: CPT

## 2023-08-20 PROCEDURE — HZ2ZZZZ DETOXIFICATION SERVICES FOR SUBSTANCE ABUSE TREATMENT: ICD-10-PCS | Performed by: FAMILY MEDICINE

## 2023-08-20 PROCEDURE — 71275 CT ANGIOGRAPHY CHEST: CPT

## 2023-08-20 PROCEDURE — 82248 BILIRUBIN DIRECT: CPT | Performed by: EMERGENCY MEDICINE

## 2023-08-20 PROCEDURE — 93005 ELECTROCARDIOGRAM TRACING: CPT | Performed by: STUDENT IN AN ORGANIZED HEALTH CARE EDUCATION/TRAINING PROGRAM

## 2023-08-20 PROCEDURE — 84145 PROCALCITONIN (PCT): CPT | Performed by: FAMILY MEDICINE

## 2023-08-20 PROCEDURE — 93970 EXTREMITY STUDY: CPT

## 2023-08-20 RX ORDER — ONDANSETRON 2 MG/ML
4 INJECTION INTRAMUSCULAR; INTRAVENOUS ONCE
Status: COMPLETED | OUTPATIENT
Start: 2023-08-20 | End: 2023-08-20

## 2023-08-20 RX ORDER — NITROGLYCERIN 0.4 MG/1
0.4 TABLET SUBLINGUAL EVERY 5 MIN PRN
Status: DISCONTINUED | OUTPATIENT
Start: 2023-08-20 | End: 2023-08-22 | Stop reason: HOSPADM

## 2023-08-20 RX ORDER — ROSUVASTATIN CALCIUM 10 MG/1
20 TABLET, COATED ORAL DAILY
Status: DISCONTINUED | OUTPATIENT
Start: 2023-08-20 | End: 2023-08-22 | Stop reason: HOSPADM

## 2023-08-20 RX ORDER — ENOXAPARIN SODIUM 100 MG/ML
1 INJECTION SUBCUTANEOUS EVERY 12 HOURS
Status: DISCONTINUED | OUTPATIENT
Start: 2023-08-21 | End: 2023-08-21

## 2023-08-20 RX ORDER — ALBUTEROL SULFATE 90 UG/1
2 AEROSOL, METERED RESPIRATORY (INHALATION) EVERY 6 HOURS PRN
Status: DISCONTINUED | OUTPATIENT
Start: 2023-08-20 | End: 2023-08-22 | Stop reason: HOSPADM

## 2023-08-20 RX ORDER — BISACODYL 10 MG
10 SUPPOSITORY, RECTAL RECTAL DAILY PRN
Status: DISCONTINUED | OUTPATIENT
Start: 2023-08-20 | End: 2023-08-22 | Stop reason: HOSPADM

## 2023-08-20 RX ORDER — FLUTICASONE FUROATE AND VILANTEROL 100; 25 UG/1; UG/1
1 POWDER RESPIRATORY (INHALATION) DAILY
Status: DISCONTINUED | OUTPATIENT
Start: 2023-08-20 | End: 2023-08-22 | Stop reason: HOSPADM

## 2023-08-20 RX ORDER — PANTOPRAZOLE SODIUM 40 MG/1
40 TABLET, DELAYED RELEASE ORAL DAILY
Status: DISCONTINUED | OUTPATIENT
Start: 2023-08-20 | End: 2023-08-22 | Stop reason: HOSPADM

## 2023-08-20 RX ORDER — ONDANSETRON 2 MG/ML
4 INJECTION INTRAMUSCULAR; INTRAVENOUS EVERY 6 HOURS PRN
Status: DISCONTINUED | OUTPATIENT
Start: 2023-08-20 | End: 2023-08-22 | Stop reason: HOSPADM

## 2023-08-20 RX ORDER — DOCUSATE SODIUM 100 MG/1
100 CAPSULE, LIQUID FILLED ORAL 2 TIMES DAILY
Status: DISCONTINUED | OUTPATIENT
Start: 2023-08-20 | End: 2023-08-22 | Stop reason: HOSPADM

## 2023-08-20 RX ORDER — MAGNESIUM SULFATE HEPTAHYDRATE 40 MG/ML
2 INJECTION, SOLUTION INTRAVENOUS ONCE
Status: COMPLETED | OUTPATIENT
Start: 2023-08-20 | End: 2023-08-20

## 2023-08-20 RX ORDER — IOPAMIDOL 755 MG/ML
63 INJECTION, SOLUTION INTRAVASCULAR ONCE
Status: COMPLETED | OUTPATIENT
Start: 2023-08-20 | End: 2023-08-20

## 2023-08-20 RX ORDER — LIDOCAINE 40 MG/G
CREAM TOPICAL
Status: DISCONTINUED | OUTPATIENT
Start: 2023-08-20 | End: 2023-08-22 | Stop reason: HOSPADM

## 2023-08-20 RX ORDER — ACETAMINOPHEN 325 MG/1
650 TABLET ORAL EVERY 6 HOURS PRN
Status: DISCONTINUED | OUTPATIENT
Start: 2023-08-20 | End: 2023-08-22 | Stop reason: HOSPADM

## 2023-08-20 RX ORDER — ACETAMINOPHEN 500 MG
1000 TABLET ORAL 3 TIMES DAILY PRN
COMMUNITY

## 2023-08-20 RX ORDER — PREDNISONE 5 MG/1
5 TABLET ORAL DAILY
Status: DISCONTINUED | OUTPATIENT
Start: 2023-08-20 | End: 2023-08-22 | Stop reason: HOSPADM

## 2023-08-20 RX ORDER — MYCOPHENOLATE MOFETIL 250 MG/1
750 CAPSULE ORAL 2 TIMES DAILY
Status: DISCONTINUED | OUTPATIENT
Start: 2023-08-20 | End: 2023-08-22 | Stop reason: HOSPADM

## 2023-08-20 RX ORDER — ACETAMINOPHEN 650 MG/1
650 SUPPOSITORY RECTAL EVERY 6 HOURS PRN
Status: DISCONTINUED | OUTPATIENT
Start: 2023-08-20 | End: 2023-08-22 | Stop reason: HOSPADM

## 2023-08-20 RX ORDER — LATANOPROST 50 UG/ML
1 SOLUTION/ DROPS OPHTHALMIC AT BEDTIME
Status: DISCONTINUED | OUTPATIENT
Start: 2023-08-20 | End: 2023-08-22 | Stop reason: HOSPADM

## 2023-08-20 RX ORDER — ENOXAPARIN SODIUM 100 MG/ML
1 INJECTION SUBCUTANEOUS ONCE
Status: COMPLETED | OUTPATIENT
Start: 2023-08-20 | End: 2023-08-20

## 2023-08-20 RX ORDER — ONDANSETRON 4 MG/1
4 TABLET, ORALLY DISINTEGRATING ORAL EVERY 6 HOURS PRN
Status: DISCONTINUED | OUTPATIENT
Start: 2023-08-20 | End: 2023-08-22 | Stop reason: HOSPADM

## 2023-08-20 RX ORDER — HYDROXYZINE HYDROCHLORIDE 10 MG/1
10 TABLET, FILM COATED ORAL EVERY 8 HOURS PRN
Status: DISCONTINUED | OUTPATIENT
Start: 2023-08-20 | End: 2023-08-22 | Stop reason: HOSPADM

## 2023-08-20 RX ORDER — LORAZEPAM 0.5 MG/1
0.5 TABLET ORAL ONCE
Status: COMPLETED | OUTPATIENT
Start: 2023-08-20 | End: 2023-08-20

## 2023-08-20 RX ORDER — ENTECAVIR 0.5 MG/1
0.5 TABLET, FILM COATED ORAL DAILY
Status: DISCONTINUED | OUTPATIENT
Start: 2023-08-20 | End: 2023-08-22 | Stop reason: HOSPADM

## 2023-08-20 RX ORDER — ISOSORBIDE MONONITRATE 60 MG/1
60 TABLET, EXTENDED RELEASE ORAL DAILY
Status: DISCONTINUED | OUTPATIENT
Start: 2023-08-20 | End: 2023-08-22 | Stop reason: HOSPADM

## 2023-08-20 RX ORDER — ASPIRIN 81 MG/1
81 TABLET ORAL DAILY
Status: DISCONTINUED | OUTPATIENT
Start: 2023-08-21 | End: 2023-08-22 | Stop reason: HOSPADM

## 2023-08-20 RX ADMIN — ONDANSETRON 4 MG: 2 INJECTION INTRAMUSCULAR; INTRAVENOUS at 10:15

## 2023-08-20 RX ADMIN — ACETAMINOPHEN 650 MG: 325 TABLET ORAL at 18:01

## 2023-08-20 RX ADMIN — ROSUVASTATIN CALCIUM 20 MG: 10 TABLET, FILM COATED ORAL at 19:58

## 2023-08-20 RX ADMIN — ENTECAVIR 0.5 MG: 0.5 TABLET ORAL at 17:59

## 2023-08-20 RX ADMIN — SODIUM CHLORIDE 1000 ML: 9 INJECTION, SOLUTION INTRAVENOUS at 09:59

## 2023-08-20 RX ADMIN — LORAZEPAM 0.5 MG: 0.5 TABLET ORAL at 15:29

## 2023-08-20 RX ADMIN — IOPAMIDOL 63 ML: 755 INJECTION, SOLUTION INTRAVENOUS at 13:20

## 2023-08-20 RX ADMIN — FLUOXETINE 40 MG: 20 CAPSULE ORAL at 19:57

## 2023-08-20 RX ADMIN — PANTOPRAZOLE SODIUM 40 MG: 40 TABLET, DELAYED RELEASE ORAL at 19:59

## 2023-08-20 RX ADMIN — MAGNESIUM SULFATE HEPTAHYDRATE 2 G: 40 INJECTION, SOLUTION INTRAVENOUS at 11:43

## 2023-08-20 RX ADMIN — SODIUM CHLORIDE 1000 ML: 9 INJECTION, SOLUTION INTRAVENOUS at 12:42

## 2023-08-20 RX ADMIN — FLUTICASONE FUROATE AND VILANTEROL TRIFENATATE 1 PUFF: 100; 25 POWDER RESPIRATORY (INHALATION) at 17:58

## 2023-08-20 RX ADMIN — ONDANSETRON 4 MG: 2 INJECTION INTRAMUSCULAR; INTRAVENOUS at 12:32

## 2023-08-20 RX ADMIN — ENOXAPARIN SODIUM 80 MG: 80 INJECTION SUBCUTANEOUS at 15:34

## 2023-08-20 RX ADMIN — ISOSORBIDE MONONITRATE 60 MG: 60 TABLET, EXTENDED RELEASE ORAL at 17:59

## 2023-08-20 RX ADMIN — FLUTICASONE FUROATE 1 PUFF: 100 POWDER RESPIRATORY (INHALATION) at 17:59

## 2023-08-20 RX ADMIN — MYCOPHENOLATE MOFETIL 750 MG: 250 CAPSULE ORAL at 20:01

## 2023-08-20 RX ADMIN — DOCUSATE SODIUM 100 MG: 100 CAPSULE, LIQUID FILLED ORAL at 20:00

## 2023-08-20 RX ADMIN — LATANOPROST 1 DROP: 50 SOLUTION OPHTHALMIC at 20:00

## 2023-08-20 RX ADMIN — PREDNISONE 5 MG: 5 TABLET ORAL at 19:58

## 2023-08-20 RX ADMIN — FLUOXETINE 20 MG: 20 CAPSULE ORAL at 20:26

## 2023-08-20 ASSESSMENT — ACTIVITIES OF DAILY LIVING (ADL)
ADLS_ACUITY_SCORE: 35
DEPENDENT_IADLS:: INDEPENDENT
ADLS_ACUITY_SCORE: 39
ADLS_ACUITY_SCORE: 39
ADLS_ACUITY_SCORE: 35
ADLS_ACUITY_SCORE: 39
ADLS_ACUITY_SCORE: 35
ADLS_ACUITY_SCORE: 39

## 2023-08-20 NOTE — PHARMACY-ADMISSION MEDICATION HISTORY
Pharmacist Admission Medication History    Admission medication history is complete. The information provided in this note is only as accurate as the sources available at the time of the update.    Medication reconciliation/reorder completed by provider prior to medication history? No    Information Source(s): Patient via in-person    Pertinent Information: Patient reports that he has not taken any medications since Friday morning.      Changes made to PTA medication list:  Added: none  Deleted: duplicate aspirin, lidocaine patch, potassium chloride  Changed: acetaminophen, aspirin, budesonide    Medication Affordability:  Not including over the counter (OTC) medications, was there a time in the past 3 months when you did not take your medications as prescribed because of cost?: No    Allergies reviewed with patient and updates made in EHR: yes    Medication History Completed By: Jennifer Valdez RPH 8/20/2023 3:42 PM  PTA Med List   Medication Sig Note Last Dose    acetaminophen (TYLENOL) 500 MG tablet Take 1,000 mg by mouth 3 times daily as needed for mild pain  Unknown at prn    albuterol (PROAIR HFA) 108 (90 Base) MCG/ACT inhaler Inhale 2 puffs into the lungs every 6 hours as needed for shortness of breath / dyspnea or wheezing  Unknown at prn    aspirin 81 MG EC tablet Take 81 mg by mouth daily  Past Week at am    budesonide (PULMICORT FLEXHALER) 90 MCG/ACT inhaler Inhale 2 puffs into the lungs 2 times daily (Patient taking differently: Inhale 2 puffs into the lungs 2 times daily as needed)  Unknown at prn    calcium citrate-vitamin D (CITRACAL) 315-200 MG-UNIT TABS Take 1 tablet by mouth daily.  Past Week at am    entecavir (BARACLUDE) 0.5 MG tablet Take 1 tablet (0.5 mg) by mouth daily  8/18/2023 at am    FLUoxetine (PROZAC) 20 MG capsule Take 1 capsule (20 mg) by mouth daily With 40mg for a total daily dose of 60mg  8/18/2023 at am    FLUoxetine (PROZAC) 40 MG capsule Take 1 capsule (40 mg) by mouth daily   8/18/2023 at am    fluticasone-salmeterol (ADVAIR) 250-50 MCG/ACT inhaler Inhale 1 puff into the lungs 2 times daily as needed (PRN)  Unknown at prn    furosemide (LASIX) 20 MG tablet Take 1 tablet (20 mg) by mouth daily as needed (fluid retention)  Unknown at prn    hydrOXYzine (ATARAX) 10 MG tablet Take 1 tablet (10 mg) by mouth every 8 hours as needed for anxiety  Unknown at prn    isosorbide mononitrate (IMDUR) 60 MG 24 hr tablet Take 60 mg by mouth daily  8/18/2023 at am    latanoprost (XALATAN) 0.005 % ophthalmic solution Place 1 drop into both eyes At Bedtime  Past Week at pm    metoprolol succinate ER (TOPROL XL) 25 MG 24 hr tablet Take 0.5 tablets (12.5 mg) by mouth daily  8/18/2023 at am    Multiple Vitamins-Iron (ONE DAILY MULTIVITAMIN/IRON) TABS Take 1 tablet by mouth daily  Past Week at am    mycophenolate (GENERIC EQUIVALENT) 250 MG capsule Take 3 capsules (750 mg) by mouth 2 times daily  8/18/2023 at am    naltrexone (DEPADE/REVIA) 50 MG tablet Take 1 tablet (50 mg) by mouth daily Take 1/2 tablet (25 mg) daily for 1 week, then take 1 tablet (50 mg ) daily. 8/20/2023: Patient reports only taking one dose on 8/18/23. 8/18/2023 at am    nitroGLYcerin (NITROSTAT) 0.4 MG sublingual tablet Place 1 tablet (0.4 mg) under the tongue every 5 minutes as needed for chest pain  Unknown at prn    Omega-3 Fatty Acids (OMEGA 3 PO) Take 1 capsule by mouth daily Dose unknown  Past Week at prn    pantoprazole (PROTONIX) 40 MG EC tablet Take 1 tablet (40 mg) by mouth daily  8/18/2023 at am    polyethylene glycol (MIRALAX) 17 g packet Take 17 g by mouth daily as needed   Unknown at prn    predniSONE (DELTASONE) 5 MG tablet Take 1 tablet (5 mg) by mouth daily  8/18/2023 at am    rosuvastatin (CRESTOR) 20 MG tablet TAKE 1 TABLET(20 MG) BY MOUTH DAILY  8/18/2023 at am    tacrolimus (PROTOPIC) 0.1 % external ointment Apply topically 2 times daily as needed  Unknown at prn

## 2023-08-20 NOTE — ED NOTES
[facility] ED Handoff Report    ED Chief Complaint: fatigue    ED Diagnosis:  (I26.94) Multiple subsegmental pulmonary emboli without acute cor pulmonale (H)  Plan: gave 80mg lovenox    (R09.02) Hypoxia  Comment: 2L NC         PMH:    Past Medical History:   Diagnosis Date    Acute midline low back pain without sciatica     AION (acute ischemic optic neuropathy)     Anemia in chronic renal disease     Arthritis     Avascular necrosis of bones of both hips (H)     s/p bilateral hip replacements    Avascular necrosis of bones of both hips (H)     s/p bilateral hip replacements    Basal cell carcinoma     Chronic hepatitis B (H)     Clostridium difficile colitis     COPD (chronic obstructive pulmonary disease) (H)     Coronary artery disease involving native coronary artery of native heart without angina pectoris 06/17/2014    Coronary angiogram 6/17/14: Severe distal 3-vessel disease involving small vessels, not amenable to PCI or CABG.    CRP elevated     Depression     Depressive disorder     Diverticulosis     Dyslipidemia     Elevated C-reactive protein (CRP)     FSGS (focal segmental glomerulosclerosis)     FSGS (focal segmental glomerulosclerosis)     Gastric ulcer with hemorrhage 02/12/2012    Gastric ulcer with hemorrhage 02/12/2012    GERD (gastroesophageal reflux disease)     Glaucoma     OHTN    Glaucoma     Hemorrhoids     Hemorrhoids     History of blood transfusion     HTN (hypertension)     Hyperlipidemia     Hypertension secondary to other renal disorders     Hypogonadism in male     Immunosuppressed status (H)     Kidney replaced by transplant     focal glomerulosclerosis     Kidney transplanted     focal glomerulosclerosis     NSTEMI (non-ST elevated myocardial infarction) (H) 06/17/2014    NSTEMI (non-ST elevated myocardial infarction) (H) 06/17/2014    JULIANE (obstructive sleep apnea)     Doesn't use CPAP    Paracentral scotoma     LE    Secondary renal hyperparathyroidism (H)     Secondary  "renal hyperparathyroidism (H)     Septic bursitis     Squamous cell carcinoma     Uncomplicated asthma         Code Status:  Prior     Falls Risk: Yes Band: Applied    Current Living Situation/Residence: lives in an apartment     Elimination Status: Continent: Yes     Activity Level: SBA    Patients Preferred Language:  English     Needed: No    Vital Signs:  /58   Pulse 72   Temp 98.4  F (36.9  C) (Oral)   Resp 18   Ht 1.702 m (5' 7\")   Wt 81.6 kg (180 lb)   SpO2 99%   BMI 28.19 kg/m       Cardiac Rhythm: gen med    Pain Score: 4/10    Is the Patient Confused:  No    Last Food or Drink: 8/19/23 at PTA    Focused Assessment:  generalized weakness, anxiety    Tests Performed: Done: Labs and Imaging    Labs Ordered and Resulted from Time of ED Arrival to Time of ED Departure   CBC WITH PLATELETS - Abnormal       Result Value    WBC Count 8.4      RBC Count 4.62      Hemoglobin 12.5 (*)     Hematocrit 41.3      MCV 89      MCH 27.1      MCHC 30.3 (*)     RDW 15.6 (*)     Platelet Count 405     BASIC METABOLIC PANEL - Abnormal    Sodium 139      Potassium 3.7      Chloride 99      Carbon Dioxide (CO2) 24      Anion Gap 16 (*)     Urea Nitrogen 6.5 (*)     Creatinine 0.70      Calcium 9.5      Glucose 77      GFR Estimate >90     HEPATIC FUNCTION PANEL - Abnormal    Protein Total 6.2 (*)     Albumin 3.4 (*)     Bilirubin Total 0.9      Alkaline Phosphatase 64      AST 23      ALT 9      Bilirubin Direct 0.25     ROUTINE UA WITH MICROSCOPIC REFLEX TO CULTURE - Abnormal    Color Urine Light Yellow      Appearance Urine Clear      Glucose Urine Negative      Bilirubin Urine Negative      Ketones Urine 60 (*)     Specific Gravity Urine 1.015      Blood Urine Negative      pH Urine 6.0      Protein Albumin Urine 20 (*)     Urobilinogen Urine <2.0      Nitrite Urine Negative      Leukocyte Esterase Urine Negative      Mucus Urine Present (*)     Amorphous Crystals Urine Few (*)     RBC Urine <1      WBC " Urine 1      Squamous Epithelials Urine <1      Hyaline Casts Urine 10 (*)     Waxy Cast Urine 3 (*)    TSH - Abnormal    TSH 5.33 (*)    MAGNESIUM - Abnormal    Magnesium 1.4 (*)    ETHYL ALCOHOL LEVEL - Normal    Alcohol ethyl <0.01         CT Chest Pulmonary Embolism w Contrast   Final Result   Abnormal   IMPRESSION:   1.  Right lower lobe segmental and subsegmental pulmonary artery emboli. Overall mild clot burden. No central or definite left-sided pulmonary artery embolism. No right heart strain. Note that this exam is limited due to respiratory motion.   2.  Peripheral right upper lobe 2 cm part solid irregular opacity. This is favored to reflect mild pneumonic infiltrate rather than a mass. As a precaution, recommend short interval CT follow-up in 4-6 weeks.   3.  Cirrhotic liver morphology.               [Critical Result: New diagnosis of pulmonary embolism]      Finding was identified on 8/20/2023 1:37 PM CDT.       Dr. Filemon Donald was contacted by me on 8/20/2023 2:00 PM CDT and verbalized understanding of the critical result.           Treatments Provided:  anticoagulants, oxygen    Family Dynamics/Concerns: No    Family Updated On Visitor Policy: No    Plan of Care Communicated to Family: No    Who Was Updated about Plan of Care: patient    Belongings Checklist Done and Signed by Patient: No    Medications sent with patient: NA    Covid: asymptomatic , not tested    Additional Information: no    RN: Genia Ibrahim RN 8/20/2023 4:00 PM

## 2023-08-20 NOTE — ED NOTES
Bed: JNED-20  Expected date:   Expected time:   Means of arrival:   Comments:  [facility] - 63 y M Failure to Thrive

## 2023-08-20 NOTE — ED PROVIDER NOTES
EMERGENCY DEPARTMENT ENCOUnter      NAME: Jerad Ross  AGE: 63 year old male  YOB: 1960  MRN: [id number]  EVALUATION DATE & TIME: 2023  9:34 AM    PCP: Aston Perry    ED PROVIDER: Filemon Donald DO      Chief Complaint   Patient presents with    Fatigue                FINAL IMPRESSION:  1. Multiple subsegmental pulmonary emboli without acute cor pulmonale (H)    2. Hypoxia    3. Wheezing          ED COURSE & MEDICAL DECISION MAKIN:50 AM I met with the patient, obtained history, performed an initial exam, and discussed options and plan for diagnostics and treatment here in the ED.  '  2:07 PM Spoke with radiologist regarding the patient imaging results.  2:07 PM Paged hospitalist.     The patient presented to the emergency department today complaining of fatigue.  He was found to be hypoxic and was started on supplemental oxygen.  CT today reveals evidence of pulmonary emboli.  He was started on Lovenox and will be admitted to the hospital for further treatment.  He is comfortable with this plan.        Medical Decision Making    History:  Supplemental history from: Documented in chart, if applicable  External Record(s) reviewed: Documented in chart, if applicable.    Work Up:  Chart documentation includes differential considered and any EKGs or imaging independently interpreted by provider, where specified.  In additional to work up documented, I considered the following work up: Documented in chart, if applicable.    External consultation:  Discussion of management with another provider: Documented in chart, if applicable and Hospitalist and Radiology (regarding imaging)    Complicating factors:  Care impacted by chronic illness: N/A  Care affected by social determinants of health: N/A    Disposition considerations: Admit.        At the conclusion of the encounter I discussed the results of all of the tests and the disposition. The questions were answered. The patient or  "family acknowledged understanding and was agreeable with the care plan.     The patient is critically ill and has required 40 minutes of critical care time exclusive of procedures. This includes time spent interviewing the patient, ordering tests and medications, monitoring vital signs, reviewing results, patient updates, discussing the case with family and consultants, and admission.      =================================================================    HPI        Jerad Ross is a 63 year old male with a pertinent history of NSTEMI, depression, GERD, HTN, HLD, JULIANE, CABG, KIM, kidney transplant, diverticulosis,who presents to this ED via EMS for evaluation of fatigue.     The patient presents with generalized weakness since 8/18/23.He was unable to get up from bed due to generalized weakness. He just started taking Naltrexone on Friday. 30 minutes later after taking the medication he felt \"woozy\". He endorses nausea with no emesis. He reports having shallow breathings. He denies any abdominal pain, back pain, or any other medical concerns at the moment.     PAST MEDICAL HISTORY:  Past Medical History:   Diagnosis Date    Acute midline low back pain without sciatica     AION (acute ischemic optic neuropathy)     Anemia in chronic renal disease     Arthritis     Avascular necrosis of bones of both hips (H)     s/p bilateral hip replacements    Avascular necrosis of bones of both hips (H)     s/p bilateral hip replacements    Basal cell carcinoma     Chronic hepatitis B (H)     Clostridium difficile colitis     COPD (chronic obstructive pulmonary disease) (H)     Coronary artery disease involving native coronary artery of native heart without angina pectoris 06/17/2014    Coronary angiogram 6/17/14: Severe distal 3-vessel disease involving small vessels, not amenable to PCI or CABG.    CRP elevated     Depression     Depressive disorder     Diverticulosis     Dyslipidemia     Elevated C-reactive protein (CRP)     " FSGS (focal segmental glomerulosclerosis)     FSGS (focal segmental glomerulosclerosis)     Gastric ulcer with hemorrhage 02/12/2012    Gastric ulcer with hemorrhage 02/12/2012    GERD (gastroesophageal reflux disease)     Glaucoma     OHTN    Glaucoma     Hemorrhoids     Hemorrhoids     History of blood transfusion     HTN (hypertension)     Hyperlipidemia     Hypertension secondary to other renal disorders     Hypogonadism in male     Immunosuppressed status (H)     Kidney replaced by transplant     focal glomerulosclerosis     Kidney transplanted     focal glomerulosclerosis     NSTEMI (non-ST elevated myocardial infarction) (H) 06/17/2014    NSTEMI (non-ST elevated myocardial infarction) (H) 06/17/2014    JULIANE (obstructive sleep apnea)     Doesn't use CPAP    Paracentral scotoma     LE    Secondary renal hyperparathyroidism (H)     Secondary renal hyperparathyroidism (H)     Septic bursitis     Squamous cell carcinoma     Uncomplicated asthma        PAST SURGICAL HISTORY:  Past Surgical History:   Procedure Laterality Date    APPENDECTOMY      ARTHROPLASTY REVISION HIP Right 6/19/2023    Procedure: RIGHT HIP REVISION;  Surgeon: Juan Mcdonough MD;  Location:  OR    BIOPSY      Cardiac Bypass surgery  06/08/2020    [facility]     CATARACT EXTRACTION EXTRACAPSULAR W/ INTRAOCULAR LENS IMPLANTATION Bilateral 4-20-10, 6-1-10    CATARACT IOL, RT/LT  04/19/2000    RE    CATARACT IOL, RT/LT  06/01/2000    LE    COLECTOMY PARTIAL  01/01/1983     10 cm, diverticulitis     COLECTOMY SUBTOTAL  1983    10 cm, diverticulitis    COLONOSCOPY  02/13/2012    Procedure:COLONOSCOPY; Surgeon:SLOAN GALLARDO; Location: GI    COLONOSCOPY N/A 01/22/2020    Procedure: Colonoscopy, With Polypectomy And Biopsy;  Surgeon: Aston Kiran MD;  Location:  GI    CV CORONARY ANGIOGRAM N/A 06/02/2020    Procedure: Coronary Angiogram;  Surgeon: Alona Florentino MD;  Location: [facility] Cath Lab;  Service: Cardiology     CV CORONARY ANGIOGRAM N/A 09/20/2021    Procedure: Coronary Angiogram;  Surgeon: Adam Agudelo MD;  Location: [facility] CV    CV LEFT HEART CATHETERIZATION WITHOUT LEFT VENTRICULOGRAM Left 06/02/2020    Procedure: Left Heart Catheterization Without Left Ventriculogram;  Surgeon: Alona Florentino MD;  Location: [facility] Cath Lab;  Service: Cardiology    CV SUPRAVALVULAR AORTOGRAM N/A 09/20/2021    Procedure: Supravalvular Aortagram;  Surgeon: Adam Agudelo MD;  Location: [facility] CV    ESOPHAGOSCOPY, GASTROSCOPY, DUODENOSCOPY (EGD), COMBINED  02/13/2012    Procedure:COMBINED ESOPHAGOSCOPY, GASTROSCOPY, DUODENOSCOPY (EGD); Surgeon:SLOAN GALLARDO; Location: GI    ESOPHAGOSCOPY, GASTROSCOPY, DUODENOSCOPY (EGD), COMBINED  02/13/2012    EXTRACAPSULAR CATARACT EXTRATION WITH INTRAOCULAR LENS IMPLANT  4-20-10, 6-1-10    Rt, Lt    HERNIA REPAIR  1995    Lt inguinal    HERNIA REPAIR  01/01/1995    Lt inguinal    HIP SURGERY      1981, bilat MITZI, revised 2001, 2005    HIP SURGERY  01/01/1981    bilat MITZI, revised 2001, 2005    IR FINE NEEDLE ASPIRATION W ULTRASOUND  04/10/2023    KIDNEY SURGERY  1978 and 1993    transplant    KNEE SURGERY  1983, 1987    bilat TKA    MOHS MICROGRAPHIC PROCEDURE      MOHS MICROGRAPHIC PROCEDURE      ORTHOPEDIC SURGERY      OTHER SURGICAL HISTORY      kidney [id number], 1993    PHACOEMULSIFICATION CLEAR CORNEA WITH STANDARD INTRAOCULAR LENS IMPLANT Right 04/19/2000    PHACOEMULSIFICATION CLEAR CORNEA WITH STANDARD INTRAOCULAR LENS IMPLANT Left 06/01/2000    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, auxiliary spleen    TONSILLECTOMY      TRANSPLANT      VASCULAR SURGERY  1976           CURRENT MEDICATIONS:    acetaminophen (TYLENOL) 500 MG tablet  albuterol (PROAIR HFA) 108 (90 Base) MCG/ACT inhaler  aspirin 81 MG EC tablet  budesonide (PULMICORT FLEXHALER) 90 MCG/ACT inhaler  calcium citrate-vitamin D (CITRACAL) 315-200 MG-UNIT TABS  entecavir (BARACLUDE)  0.5 MG tablet  FLUoxetine (PROZAC) 20 MG capsule  FLUoxetine (PROZAC) 40 MG capsule  fluticasone-salmeterol (ADVAIR) 250-50 MCG/ACT inhaler  furosemide (LASIX) 20 MG tablet  hydrOXYzine (ATARAX) 10 MG tablet  isosorbide mononitrate (IMDUR) 60 MG 24 hr tablet  latanoprost (XALATAN) 0.005 % ophthalmic solution  metoprolol succinate ER (TOPROL XL) 25 MG 24 hr tablet  Multiple Vitamins-Iron (ONE DAILY MULTIVITAMIN/IRON) TABS  mycophenolate (GENERIC EQUIVALENT) 250 MG capsule  naltrexone (DEPADE/REVIA) 50 MG tablet  nitroGLYcerin (NITROSTAT) 0.4 MG sublingual tablet  Omega-3 Fatty Acids (OMEGA 3 PO)  pantoprazole (PROTONIX) 40 MG EC tablet  polyethylene glycol (MIRALAX) 17 g packet  predniSONE (DELTASONE) 5 MG tablet  Rivaroxaban ANTICOAGULANT 15 & 20 MG TBPK Starter Therapy Pack  rosuvastatin (CRESTOR) 20 MG tablet  tacrolimus (PROTOPIC) 0.1 % external ointment        ALLERGIES:  Allergies   Allergen Reactions    Penicillins Shortness Of Breath and Hives    Keflex [Cephalexin Hcl] Unknown     Patient could not recall reaction    Sulfa Antibiotics Rash    Tetracycline Unknown     Patient could not recall reaction       FAMILY HISTORY:  Family History   Problem Relation Age of Onset    Cardiovascular Father         AI with valve repair    Hypertension Father     Kidney Disease Father     Other - See Comments Father         AI with valve repair    Cerebrovascular Disease Maternal Grandfather     Other - See Comments Maternal Grandfather         cerebrovascular disease    Cancer Paternal Grandmother         ovarian ca    Ovarian Cancer Paternal Grandmother     Cerebrovascular Disease Paternal Grandfather     Kidney Disease Paternal Aunt     Kidney Disease Paternal Aunt     Skin Cancer No family hx of     Glaucoma No family hx of     Macular Degeneration No family hx of     Amblyopia No family hx of     Melanoma No family hx of     Diabetes No family hx of        SOCIAL HISTORY:   Social History     Socioeconomic History     Marital status:      Spouse name: None    Number of children: 0    Years of education: None    Highest education level: None   Occupational History     Employer: DISABLED    Occupation:      Employer: [employer]   Tobacco Use    Smoking status: Former     Packs/day: 1.00     Years: 9.00     Pack years: 9.00     Types: Cigarettes     Start date: 1980     Quit date: 1988     Years since quittin.6    Smokeless tobacco: Former   Vaping Use    Vaping Use: Never used   Substance and Sexual Activity    Alcohol use: Not Currently     Comment: quit drinking 2023    Drug use: Not Currently     Types: Oxycodone    Sexual activity: Not Currently     Partners: Female   Other Topics Concern    Parent/sibling w/ CABG, MI or angioplasty before 65F 55M? No    Special Diet No    Exercise Yes     Comment: walks   Social History Narrative    Lives alone with a roommate.  Not mention his wife to me.       VITALS:  Patient Vitals for the past 24 hrs:   BP Temp Temp src Pulse Resp SpO2   23 1100 117/63 98  F (36.7  C) Oral 69 20 92 %   23 1002 111/65 -- -- 74 -- --   23 0700 119/69 97.7  F (36.5  C) Oral 71 20 93 %   23 0358 124/76 98.2  F (36.8  C) Oral 76 20 91 %       PHYSICAL EXAM    Constitutional:  Well developed, Well nourished. Appears globally fatigue.   HENT:  Normocephalic, Atraumatic, Oropharynx moist, Nose normal.   Eyes:  EOMI, Conjunctiva normal, No discharge.   Respiratory:  Normal breath sounds, No respiratory distress, No wheezing, No chest tenderness.   Cardiovascular:  Normal heart rate, Normal rhythm, No murmurs  GI:  Soft, No tenderness, No guarding, No CVA tenderness.   Musculoskeletal:  No tenderness to palpation or major deformities noted.   Extremities: No lower extremity edema.  Neurologic:  Alert & oriented x 3, No focal deficits noted.   Psychiatric:  Affect normal, Judgment normal, Mood normal.        LAB:  All pertinent labs reviewed and  interpreted.  Results for orders placed or performed during the hospital encounter of 08/20/23                                                                                            CBC with platelets   Result Value Ref Range    WBC Count 8.4 4.0 - 11.0 10e3/uL    RBC Count 4.62 4.40 - 5.90 10e6/uL    Hemoglobin 12.5 (L) 13.3 - 17.7 g/dL    Hematocrit 41.3 40.0 - 53.0 %    MCV 89 78 - 100 fL    MCH 27.1 26.5 - 33.0 pg    MCHC 30.3 (L) 31.5 - 36.5 g/dL    RDW 15.6 (H) 10.0 - 15.0 %    Platelet Count 405 150 - 450 10e3/uL   Basic metabolic panel   Result Value Ref Range    Sodium 139 136 - 145 mmol/L    Potassium 3.7 3.4 - 5.3 mmol/L    Chloride 99 98 - 107 mmol/L    Carbon Dioxide (CO2) 24 22 - 29 mmol/L    Anion Gap 16 (H) 7 - 15 mmol/L    Urea Nitrogen 6.5 (L) 8.0 - 23.0 mg/dL    Creatinine 0.70 0.67 - 1.17 mg/dL    Calcium 9.5 8.8 - 10.2 mg/dL    Glucose 77 70 - 99 mg/dL    GFR Estimate >90 >60 mL/min/1.73m2   Hepatic function panel   Result Value Ref Range    Protein Total 6.2 (L) 6.4 - 8.3 g/dL    Albumin 3.4 (L) 3.5 - 5.2 g/dL    Bilirubin Total 0.9 <=1.2 mg/dL    Alkaline Phosphatase 64 40 - 129 U/L    AST 23 0 - 45 U/L    ALT 9 0 - 70 U/L    Bilirubin Direct 0.25 0.00 - 0.30 mg/dL   UA with Microscopic reflex to Culture    Specimen: Urine, Clean Catch   Result Value Ref Range    Color Urine Light Yellow Colorless, Straw, Light Yellow, Yellow    Appearance Urine Clear Clear    Glucose Urine Negative Negative mg/dL    Bilirubin Urine Negative Negative    Ketones Urine 60 (A) Negative mg/dL    Specific Gravity Urine 1.015 1.001 - 1.030    Blood Urine Negative Negative    pH Urine 6.0 5.0 - 7.0    Protein Albumin Urine 20 (A) Negative mg/dL    Urobilinogen Urine <2.0 <2.0 mg/dL    Nitrite Urine Negative Negative    Leukocyte Esterase Urine Negative Negative    Mucus Urine Present (A) None Seen /LPF    Amorphous Crystals Urine Few (A) None Seen /HPF    RBC Urine <1 <=2 /HPF    WBC Urine 1 <=5 /HPF    Squamous  Epithelials Urine <1 <=1 /HPF    Hyaline Casts Urine 10 (H) <=2 /LPF    Waxy Cast Urine 3 (H) None Seen /LPF   Ethyl Alcohol Level   Result Value Ref Range    Alcohol ethyl <0.01 <=0.01 g/dL   Result Value Ref Range    TSH 5.33 (H) 0.30 - 4.20 uIU/mL   Result Value Ref Range    Magnesium 1.4 (L) 1.7 - 2.3 mg/dL   Symptomatic COVID-19 Virus (Coronavirus) by PCR Nasopharyngeal    Specimen: Nasopharyngeal; Swab   Result Value Ref Range    SARS CoV2 PCR Negative Negative         RADIOLOGY:  I have independently reviewed and interpreted the above imaging, pending the final radiology read.                          CT Chest Pulmonary Embolism w Contrast   Final Result   Abnormal   IMPRESSION:   1.  Right lower lobe segmental and subsegmental pulmonary artery emboli. Overall mild clot burden. No central or definite left-sided pulmonary artery embolism. No right heart strain. Note that this exam is limited due to respiratory motion.   2.  Peripheral right upper lobe 2 cm part solid irregular opacity. This is favored to reflect mild pneumonic infiltrate rather than a mass. As a precaution, recommend short interval CT follow-up in 4-6 weeks.   3.  Cirrhotic liver morphology.               [Critical Result: New diagnosis of pulmonary embolism]      Finding was identified on 8/20/2023 1:37 PM CDT.       Dr. Filemon Donald was contacted by me on 8/20/2023 2:00 PM CDT and verbalized understanding of the critical result.           EKG:    Normal sinus rhythm at 74 bpm.  Nonspecific ST changes.  No signs of acute ischemia.  QRS 84 ms, QTc 499 ms.    I have independently reviewed and interpreted this EKG          I, Lita Swan, am serving as a scribe to document services personally performed by Dr. Donald based on my observation and the provider's statements to me. I, Filemon Donald, DO attest that Lita Swan is acting in a scribe capacity, has observed my performance of the services and has documented them in accordance with my  direction.    Filemon Donald DO  Emergency Medicine  [facility] EMERGENCY DEPARTMENT  [facility] [facility] [id number] [phone]  Dept: [phone]       Filemon Donald MD  08/23/23 0219

## 2023-08-20 NOTE — CONSULTS
Care Management Initial Consult    General Information  Assessment completed with: Patient,    Type of CM/SW Visit: Initial Assessment    Primary Care Provider verified and updated as needed: Yes   Readmission within the last 30 days: no previous admission in last 30 days      Reason for Consult: discharge planning  Advance Care Planning: Advance Care Planning Reviewed: no concerns identified          Communication Assessment  Patient's communication style: spoken language (English or Bilingual)             Cognitive  Cognitive/Neuro/Behavioral: WDL                      Living Environment:   People in home: other (see comments) (Roommate)     Current living Arrangements: house      Able to return to prior arrangements: yes       Family/Social Support:  Care provided by: self  Provides care for: no one  Marital Status:   Sibling(s)          Description of Support System: Supportive    Support Assessment: Patient communicates needs well met    Current Resources:   Patient receiving home care services: Yes  Skilled Home Care Services: Skilled Nursing, Physical Therapy (Through [facility]. Patient states that the home care services are due to end this week.)  Community Resources: [employer]  Equipment currently used at home:    Supplies currently used at home: None    Employment/Financial:  Employment Status: disabled        Financial Concerns:             Does the patient's insurance plan have a 3 day qualifying hospital stay waiver?  No    Lifestyle & Psychosocial Needs:  Social Determinants of Health     Tobacco Use: Medium Risk (8/20/2023)    Patient History     Smoking Tobacco Use: Former     Smokeless Tobacco Use: Former     Passive Exposure: Not on file   Alcohol Use: Not on file   Financial Resource Strain: Not on file   Food Insecurity: Not on file   Transportation Needs: Not on file   Physical Activity: Not on file   Stress: Not on file   Social Connections: Not on file   Intimate Partner  Violence: Not on file   Depression: At risk (8/14/2023)    PHQ-2     PHQ-2 Score: 3   Housing Stability: Not on file       Functional Status:  Prior to admission patient needed assistance:   Dependent ADLs:: Independent, Ambulation-cane, Ambulation-walker  Dependent IADLs:: Independent       Mental Health Status:          Chemical Dependency Status:                Values/Beliefs:  Spiritual, Cultural Beliefs, [employer] Practices, Values that affect care:                 Additional Information:  Met with patient at bedside, introduced self and care management  role. Patient is independent at baseline, lives in his house with a roommate. He is currently receiving home care PT through [facility] (surgery in June). He reports their service ends this week and then he will be going to OP PT. He states that he has set this up but does not recall where it is.     He is working with his county  to get a [place] waiver, stating he is hopeful to get more assist around his home.    CM to follow hospital progression, treatment team recommendations and assist with discharge planning as needed.    Transportation TBD. States he will take an Uber or Lyft back home if his insurance does not cover a medical ride.     Ruth Espinal RN

## 2023-08-20 NOTE — ED TRIAGE NOTES
BIBA from home. Failure to thrive. Weakness since Friday. Recently started Naltrexone. ETOH 9/5. Denies hx of withdrawal seizures.      Triage Assessment       Row Name 08/20/23 0932       Triage Assessment (Adult)    Airway WDL WDL       Respiratory WDL    Respiratory WDL WDL       Skin Circulation/Temperature WDL    Skin Circulation/Temperature WDL WDL       Cardiac WDL    Cardiac WDL WDL       Peripheral/Neurovascular WDL    Peripheral Neurovascular WDL WDL       Cognitive/Neuro/Behavioral WDL    Cognitive/Neuro/Behavioral WDL WDL

## 2023-08-20 NOTE — PROGRESS NOTES
RN to bedside to get UA. Pt sat up on side of bed and became diaphoretic, dizzy, and nausea. Order for zofran obtained and given. Pt back in bed and vitals rechecked. Stable. Pt reports relief of symptoms. Several minutes later 85% on RA. RN to bedside, instructed pt to take deep breaths. 94% on RA. Pt's oxygen dipped a second time. 2L NC placed. MD made away of low sats. CT PE ordered.

## 2023-08-20 NOTE — H&P
[facility]    History and Physical - Hospitalist Service       Date of Admission:  8/20/2023    Assessment & Plan      Jerad Ross is a 63 year old male with history right total hip revision 6/2023 as well as previous history longstanding alcohol abuse with recent resumption of drinking came into the emergency room department with ongoing fatigue and unable to care for self.  Found to be hypoxic with pulmonary embolus.      Acute respiratory failure  Pulmonary embolus  --- Risk factors include recent surgery with immobility.  --- Admit to telemetry  --- Start Lovenox.  Pharmacy to check on DOAC costs  --- Go cardiogram in the morning  --- Ultrasound lower extremities  --- TOT due to weakness  ---wean oxygen as able    Chronic alcohol use  --- Sober since August 5 his report  --- Naltrexone for now, just had 1 dose on Friday  --- No evidence alcohol withdrawal but will place CIWA to monitor for now  --- Cirrhosis seen on CT    Hypomagnesemia  --- Replace per protocol    Pulmonary nodule/infiltrate --- outpatient CT in 4 to 6 weeks.  --- Question of infiltrate and weakness we will check procalcitonin  --check covid    CKD  Renal transplant  Immunosuppression  --- Continue mycophenolate, prednisone    Coronary artery disease  --- Status post CABG   --- Did have Lexiscan with small area of ischemia in June perioperatively, EF 70%  ---continue imdur, asa, toprol, statin    Chronic hepatitis B--continue Entecavir  --check lft in am    GERD - continue protonix    MDD--continue Prozac  --- In one dose Ativan for anxiety in ED.  Continue home hydroxyzine    COPD  --- Doubt contributing to above.  No clear exacerbation  --- Continue home Pulmicort and albuterol         Diet:  regular  DVT Prophylaxis: Enoxaparin (Lovenox) SQ  [place] Catheter: Not present  Lines: None     Cardiac Monitoring: None  Code Status:  full code    Clinically Significant Risk Factors Present on Admission            #  "Hypomagnesemia: Lowest Mg = 1.4 mg/dL in last 2 days, will replace as needed   # Hypoalbuminemia: Lowest albumin = 3.4 g/dL at 8/20/2023  9:39 AM, will monitor as appropriate     # Drug Induced Platelet Defect: home medication list includes an antiplatelet medication     # Hypertension: Noted on problem list      # Overweight: Estimated body mass index is 28.19 kg/m  as calculated from the following:    Height as of this encounter: 1.702 m (5' 7\").    Weight as of this encounter: 81.6 kg (180 lb).              Disposition Plan      Expected Discharge Date: 08/22/2023      Destination: home;home with help/services            Juju Chung MD  Hospitalist Service  [facility]  Securely message with [employer] (more info)  Text page via [employer] Paging/Directory     ______________________________________________________________________    Chief Complaint   Weakness        History of Present Illness   Jerad Ross is a 63 year old male with a history of past renal transplant, recent total hip revision as well as chronic alcohol use who came into the emergency room department for further work-up and evaluation of weakness.  Patient states that he began feeling acutely weak Friday after taking his first dose of naltrexone.  He has been sober since August 5.  He felt weak over the weekend like he could hardly stand.  He is somewhat equivocal when asked about shortness of breath but denies cough or chest pain.  He denies any fevers or chills.  He is feeling fairly nauseous.  He felt so weak he was really unable to ambulate or take care of himself so he came into the emergency room department where he was found to have a pulmonary embolus and he is being admitted.    Was not TCU after revision has been home for 5 weeks.  His initial concern was that naltrexone was causing him the symptoms.  He has not taken a dose since.  Denies melena hematochezia diarrhea dysuria or increased frequency.  Is feeling very " anxious and takes hydroxyzine for this at home      Past Medical History    Past Medical History:   Diagnosis Date    Acute midline low back pain without sciatica     AION (acute ischemic optic neuropathy)     Anemia in chronic renal disease     Arthritis     Avascular necrosis of bones of both hips (H)     s/p bilateral hip replacements    Avascular necrosis of bones of both hips (H)     s/p bilateral hip replacements    Basal cell carcinoma     Chronic hepatitis B (H)     Clostridium difficile colitis     COPD (chronic obstructive pulmonary disease) (H)     Coronary artery disease involving native coronary artery of native heart without angina pectoris 06/17/2014    Coronary angiogram 6/17/14: Severe distal 3-vessel disease involving small vessels, not amenable to PCI or CABG.    CRP elevated     Depression     Depressive disorder     Diverticulosis     Dyslipidemia     Elevated C-reactive protein (CRP)     FSGS (focal segmental glomerulosclerosis)     FSGS (focal segmental glomerulosclerosis)     Gastric ulcer with hemorrhage 02/12/2012    Gastric ulcer with hemorrhage 02/12/2012    GERD (gastroesophageal reflux disease)     Glaucoma     OHTN    Glaucoma     Hemorrhoids     Hemorrhoids     History of blood transfusion     HTN (hypertension)     Hyperlipidemia     Hypertension secondary to other renal disorders     Hypogonadism in male     Immunosuppressed status (H)     Kidney replaced by transplant     focal glomerulosclerosis     Kidney transplanted     focal glomerulosclerosis     NSTEMI (non-ST elevated myocardial infarction) (H) 06/17/2014    NSTEMI (non-ST elevated myocardial infarction) (H) 06/17/2014    JULIANE (obstructive sleep apnea)     Doesn't use CPAP    Paracentral scotoma     LE    Secondary renal hyperparathyroidism (H)     Secondary renal hyperparathyroidism (H)     Septic bursitis     Squamous cell carcinoma     Uncomplicated asthma        Past Surgical History   Past Surgical History:   Procedure  Laterality Date    APPENDECTOMY      ARTHROPLASTY REVISION HIP Right 6/19/2023    Procedure: RIGHT HIP REVISION;  Surgeon: Juan Mcdonough MD;  Location: [facility] OR    BIOPSY      Cardiac Bypass surgery  06/08/2020    Harris's     CATARACT EXTRACTION EXTRACAPSULAR W/ INTRAOCULAR LENS IMPLANTATION Bilateral 4-20-10, 6-1-10    CATARACT IOL, RT/LT  04/19/2000    RE    CATARACT IOL, RT/LT  06/01/2000    LE    COLECTOMY PARTIAL  01/01/1983     10 cm, diverticulitis     COLECTOMY SUBTOTAL  1983    10 cm, diverticulitis    COLONOSCOPY  02/13/2012    Procedure:COLONOSCOPY; Surgeon:SLOAN GALLARDO; Location: GI    COLONOSCOPY N/A 01/22/2020    Procedure: Colonoscopy, With Polypectomy And Biopsy;  Surgeon: Aston Kiran MD;  Location:  GI    CV CORONARY ANGIOGRAM N/A 06/02/2020    Procedure: Coronary Angiogram;  Surgeon: Alona Florentino MD;  Location: [facility] Cath [facility];  Service: Cardiology    CV CORONARY ANGIOGRAM N/A 09/20/2021    Procedure: Coronary Angiogram;  Surgeon: Adam Agudelo MD;  Location: [facility] CV    CV LEFT HEART CATHETERIZATION WITHOUT LEFT VENTRICULOGRAM Left 06/02/2020    Procedure: Left Heart Catheterization Without Left Ventriculogram;  Surgeon: Alona Florentino MD;  Location: [facility] Cath Lab;  Service: Cardiology    CV SUPRAVALVULAR AORTOGRAM N/A 09/20/2021    Procedure: Supravalvular Aortagram;  Surgeon: Adam Agudelo MD;  Location: [facility] CV    ESOPHAGOSCOPY, GASTROSCOPY, DUODENOSCOPY (EGD), COMBINED  02/13/2012    Procedure:COMBINED ESOPHAGOSCOPY, GASTROSCOPY, DUODENOSCOPY (EGD); Surgeon:SLOAN GALLARDO; Location: GI    ESOPHAGOSCOPY, GASTROSCOPY, DUODENOSCOPY (EGD), COMBINED  02/13/2012    EXTRACAPSULAR CATARACT EXTRATION WITH INTRAOCULAR LENS IMPLANT  4-20-10, 6-1-10    Rt, Lt    HERNIA REPAIR  1995    Lt inguinal    HERNIA REPAIR  01/01/1995    Lt inguinal    HIP SURGERY      1981, bilat MITZI, revised 2001, 2005    HIP  SURGERY  01/01/1981    bilat MITZI, revised 2001, 2005    IR FINE NEEDLE ASPIRATION W ULTRASOUND  04/10/2023    KIDNEY SURGERY  1978 and 1993    transplant    KNEE SURGERY  1983, 1987    bilat TKA    MOHS MICROGRAPHIC PROCEDURE      MOHS MICROGRAPHIC PROCEDURE      ORTHOPEDIC SURGERY      OTHER SURGICAL HISTORY      kidney [id number], 1993    PHACOEMULSIFICATION CLEAR CORNEA WITH STANDARD INTRAOCULAR LENS IMPLANT Right 04/19/2000    PHACOEMULSIFICATION CLEAR CORNEA WITH STANDARD INTRAOCULAR LENS IMPLANT Left 06/01/2000    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, auxiliary spleen    TONSILLECTOMY      TRANSPLANT      VASCULAR SURGERY  1976       Prior to Admission Medications   Prior to Admission Medications   Prescriptions Last Dose Informant Patient Reported? Taking?   FLUoxetine (PROZAC) 20 MG capsule 8/18/2023 at am  No Yes   Sig: Take 1 capsule (20 mg) by mouth daily With 40mg for a total daily dose of 60mg   FLUoxetine (PROZAC) 40 MG capsule 8/18/2023 at am Self No Yes   Sig: Take 1 capsule (40 mg) by mouth daily   Multiple Vitamins-Iron (ONE DAILY MULTIVITAMIN/IRON) TABS Past Week at am Self Yes Yes   Sig: Take 1 tablet by mouth daily   Omega-3 Fatty Acids (OMEGA 3 PO) Past Week at prn Self Yes Yes   Sig: Take 1 capsule by mouth daily Dose unknown   acetaminophen (TYLENOL) 500 MG tablet Unknown at prn  Yes Yes   Sig: Take 1,000 mg by mouth 3 times daily as needed for mild pain   albuterol (PROAIR HFA) 108 (90 Base) MCG/ACT inhaler Unknown at prn Self No Yes   Sig: Inhale 2 puffs into the lungs every 6 hours as needed for shortness of breath / dyspnea or wheezing   aspirin 81 MG EC tablet Past Week at am  Yes Yes   Sig: Take 81 mg by mouth daily   budesonide (PULMICORT FLEXHALER) 90 MCG/ACT inhaler Unknown at prn  No Yes   Sig: Inhale 2 puffs into the lungs 2 times daily   Patient taking differently: Inhale 2 puffs into the lungs 2 times daily as needed   calcium citrate-vitamin D (CITRACAL) 315-200 MG-UNIT TABS Past  Week at am Self Yes Yes   Sig: Take 1 tablet by mouth daily.   entecavir (BARACLUDE) 0.5 MG tablet 8/18/2023 at am  No Yes   Sig: Take 1 tablet (0.5 mg) by mouth daily   fluticasone-salmeterol (ADVAIR) 250-50 MCG/ACT inhaler Unknown at prn Self No Yes   Sig: Inhale 1 puff into the lungs 2 times daily as needed (PRN)   furosemide (LASIX) 20 MG tablet Unknown at prn Self No Yes   Sig: Take 1 tablet (20 mg) by mouth daily as needed (fluid retention)   hydrOXYzine (ATARAX) 10 MG tablet Unknown at prn  No Yes   Sig: Take 1 tablet (10 mg) by mouth every 8 hours as needed for anxiety   isosorbide mononitrate (IMDUR) 60 MG 24 hr tablet 8/18/2023 at am  Yes Yes   Sig: Take 60 mg by mouth daily   latanoprost (XALATAN) 0.005 % ophthalmic solution Past Week at pm Self No Yes   Sig: Place 1 drop into both eyes At Bedtime   metoprolol succinate ER (TOPROL XL) 25 MG 24 hr tablet 8/18/2023 at am  No Yes   Sig: Take 0.5 tablets (12.5 mg) by mouth daily   mycophenolate (GENERIC EQUIVALENT) 250 MG capsule 8/18/2023 at am  No Yes   Sig: Take 3 capsules (750 mg) by mouth 2 times daily   naltrexone (DEPADE/REVIA) 50 MG tablet 8/18/2023 at am  No Yes   Sig: Take 1 tablet (50 mg) by mouth daily Take 1/2 tablet (25 mg) daily for 1 week, then take 1 tablet (50 mg ) daily.   nitroGLYcerin (NITROSTAT) 0.4 MG sublingual tablet Unknown at prn Self No Yes   Sig: Place 1 tablet (0.4 mg) under the tongue every 5 minutes as needed for chest pain   pantoprazole (PROTONIX) 40 MG EC tablet 8/18/2023 at am Self No Yes   Sig: Take 1 tablet (40 mg) by mouth daily   polyethylene glycol (MIRALAX) 17 g packet Unknown at prn Self Yes Yes   Sig: Take 17 g by mouth daily as needed    predniSONE (DELTASONE) 5 MG tablet 8/18/2023 at am Self No Yes   Sig: Take 1 tablet (5 mg) by mouth daily   rosuvastatin (CRESTOR) 20 MG tablet 8/18/2023 at am Self No Yes   Sig: TAKE 1 TABLET(20 MG) BY MOUTH DAILY   tacrolimus (PROTOPIC) 0.1 % external ointment Unknown at prn Self  Yes Yes   Sig: Apply topically 2 times daily as needed      Facility-Administered Medications: None        Review of Systems    The 5 point Review of Systems is negative other than noted in the HPI or here.      Physical Exam   Vital Signs: Temp: 98.4  F (36.9  C) Temp src: Oral BP: 108/58 Pulse: 72   Resp: 18 SpO2: 99 % O2 Device: Nasal cannula Oxygen Delivery: 2 LPM  Weight: 180 lbs 0 oz    General Appearance: Pleasant male, nontremulous  Eyes: Extra ocular muscles intact  HEENT: Oropharynx moist  Respiratory: Clear to auscultation bilaterally  Cardiovascular: Regular rate and rhythm  GI: Soft and nontender without hepatosplenomegaly  Skin: Right hip incision is healed, no open areas that I can appreciate on his skin  Musculoskeletal: No significant lower extremity edema  Neurologic: Neurologically grossly intact without focal deficits appreciated    Medical Decision Making             Data     I have personally reviewed the following data over the past 24 hrs:    8.4  \   12.5 (L)   / 405     139 99 6.5 (L) /  77   3.7 24 0.70 \     ALT: 9 AST: 23 AP: 64 TBILI: 0.9   ALB: 3.4 (L) TOT PROTEIN: 6.2 (L) LIPASE: N/A     TSH: 5.33 (H) T4: N/A A1C: N/A       Imaging results reviewed over the past 24 hrs:     EKG reviewed.  Normal sinus rhythm.  No acute ST changes.  QTc 499    Recent Results (from the past 24 hour(s))   CT Chest Pulmonary Embolism w Contrast   Result Value    Radiologist flags New diagnosis of pulmonary embolism (AA)    Narrative    EXAM: CT CHEST PULMONARY EMBOLISM W CONTRAST  LOCATION: [facility]  DATE: 8/20/2023    INDICATION: dyspnea  COMPARISON: CT chest 8/2/2021 and 6/7/2018  TECHNIQUE: CT chest pulmonary angiogram during arterial phase injection of IV contrast. Multiplanar reformats and MIP reconstructions were performed. Dose reduction techniques were used.   CONTRAST: isovue 370  63ml    FINDINGS:  ANGIOGRAM CHEST: Nonocclusive pulmonary artery emboli within right  lower lobe segmental and subsegmental branches for example image 139 series 4 and image 55 series 9. No central or left-sided pulmonary artery embolism. Borderline dilated ascending   thoracic aorta measuring 4.0 cm in diameter. No thoracic aortic dissection. The exam is limited due to respiratory motion. No CT evidence of right heart strain.    LUNGS AND PLEURA: The central airways are clear. Irregular part solid opacity peripheral right upper lobe measuring 1.3 x 2.0 cm. Mosaic lung attenuation suggesting air trapping. Bibasilar atelectasis/scarring. No pleural effusion.    MEDIASTINUM/AXILLAE: Sternotomy. No thoracic adenopathy. Upper normal heart size. No pericardial effusion.    CORONARY ARTERY CALCIFICATION: Previous intervention (stents or CABG).    UPPER ABDOMEN: Nodular liver surface contour and relative hypertrophy of the lateral left lobe suggesting cirrhosis. 8 mm fluid density inferior segment 6 hepatic lesion is most likely a cyst. 5 mm liver dome lesion image 179 is likely an additional   cyst. Recommend attention on follow-up. Stable left upper quadrant splenosis. Severe native right kidney atrophy. Nonvisualized left kidney.    MUSCULOSKELETAL: Right scapular bone islands. Stable severe degenerative changes of the left glenohumeral joint moderate right glenohumeral joint degenerative changes. These findings could reflect underlying inflammatory arthritis.      Impression    IMPRESSION:  1.  Right lower lobe segmental and subsegmental pulmonary artery emboli. Overall mild clot burden. No central or definite left-sided pulmonary artery embolism. No right heart strain. Note that this exam is limited due to respiratory motion.  2.  Peripheral right upper lobe 2 cm part solid irregular opacity. This is favored to reflect mild pneumonic infiltrate rather than a mass. As a precaution, recommend short interval CT follow-up in 4-6 weeks.  3.  Cirrhotic liver morphology.          [Critical Result: New  diagnosis of pulmonary embolism]    Finding was identified on 8/20/2023 1:37 PM CDT.     Dr. Filemon Donald was contacted by me on 8/20/2023 2:00 PM CDT and verbalized understanding of the critical result.

## 2023-08-21 ENCOUNTER — DOCUMENTATION ONLY (OUTPATIENT)
Dept: INTERNAL MEDICINE | Facility: CLINIC | Age: 63
End: 2023-08-21

## 2023-08-21 ENCOUNTER — TELEPHONE (OUTPATIENT)
Dept: PSYCHIATRY | Facility: CLINIC | Age: 63
End: 2023-08-21

## 2023-08-21 ENCOUNTER — APPOINTMENT (OUTPATIENT)
Dept: OCCUPATIONAL THERAPY | Facility: HOSPITAL | Age: 63
DRG: 299 | End: 2023-08-21
Attending: FAMILY MEDICINE
Payer: COMMERCIAL

## 2023-08-21 ENCOUNTER — APPOINTMENT (OUTPATIENT)
Dept: PHYSICAL THERAPY | Facility: HOSPITAL | Age: 63
DRG: 299 | End: 2023-08-21
Attending: FAMILY MEDICINE
Payer: COMMERCIAL

## 2023-08-21 ENCOUNTER — APPOINTMENT (OUTPATIENT)
Dept: CARDIOLOGY | Facility: HOSPITAL | Age: 63
DRG: 299 | End: 2023-08-21
Attending: FAMILY MEDICINE
Payer: COMMERCIAL

## 2023-08-21 LAB
ALBUMIN SERPL BCG-MCNC: 3.2 G/DL (ref 3.5–5.2)
ALP SERPL-CCNC: 57 U/L (ref 40–129)
ALT SERPL W P-5'-P-CCNC: 7 U/L (ref 0–70)
ANION GAP SERPL CALCULATED.3IONS-SCNC: 10 MMOL/L (ref 7–15)
AST SERPL W P-5'-P-CCNC: 28 U/L (ref 0–45)
ATRIAL RATE - MUSE: 74 BPM
BILIRUB SERPL-MCNC: 0.7 MG/DL
BUN SERPL-MCNC: 6.1 MG/DL (ref 8–23)
CALCIUM SERPL-MCNC: 9.5 MG/DL (ref 8.8–10.2)
CHLORIDE SERPL-SCNC: 101 MMOL/L (ref 98–107)
CREAT SERPL-MCNC: 0.68 MG/DL (ref 0.67–1.17)
DEPRECATED HCO3 PLAS-SCNC: 26 MMOL/L (ref 22–29)
DIASTOLIC BLOOD PRESSURE - MUSE: 73 MMHG
ERYTHROCYTE [DISTWIDTH] IN BLOOD BY AUTOMATED COUNT: 15.7 % (ref 10–15)
GFR SERPL CREATININE-BSD FRML MDRD: >90 ML/MIN/1.73M2
GLUCOSE SERPL-MCNC: 117 MG/DL (ref 70–99)
HCT VFR BLD AUTO: 37.3 % (ref 40–53)
HGB BLD-MCNC: 11.4 G/DL (ref 13.3–17.7)
HOLD SPECIMEN: NORMAL
INTERPRETATION ECG - MUSE: NORMAL
LVEF ECHO: NORMAL
MAGNESIUM SERPL-MCNC: 1.8 MG/DL (ref 1.7–2.3)
MCH RBC QN AUTO: 27.3 PG (ref 26.5–33)
MCHC RBC AUTO-ENTMCNC: 30.6 G/DL (ref 31.5–36.5)
MCV RBC AUTO: 89 FL (ref 78–100)
P AXIS - MUSE: 34 DEGREES
PLATELET # BLD AUTO: 386 10E3/UL (ref 150–450)
POTASSIUM SERPL-SCNC: 5.2 MMOL/L (ref 3.4–5.3)
PR INTERVAL - MUSE: 164 MS
PROT SERPL-MCNC: 5.9 G/DL (ref 6.4–8.3)
QRS DURATION - MUSE: 84 MS
QT - MUSE: 450 MS
QTC - MUSE: 499 MS
R AXIS - MUSE: -16 DEGREES
RBC # BLD AUTO: 4.18 10E6/UL (ref 4.4–5.9)
SODIUM SERPL-SCNC: 137 MMOL/L (ref 136–145)
SYSTOLIC BLOOD PRESSURE - MUSE: 111 MMHG
T AXIS - MUSE: 63 DEGREES
VENTRICULAR RATE- MUSE: 74 BPM
WBC # BLD AUTO: 5.8 10E3/UL (ref 4–11)

## 2023-08-21 PROCEDURE — 97110 THERAPEUTIC EXERCISES: CPT | Mod: GP

## 2023-08-21 PROCEDURE — 99232 SBSQ HOSP IP/OBS MODERATE 35: CPT | Performed by: FAMILY MEDICINE

## 2023-08-21 PROCEDURE — 80053 COMPREHEN METABOLIC PANEL: CPT | Performed by: FAMILY MEDICINE

## 2023-08-21 PROCEDURE — 97161 PT EVAL LOW COMPLEX 20 MIN: CPT | Mod: GP

## 2023-08-21 PROCEDURE — 255N000002 HC RX 255 OP 636: Performed by: FAMILY MEDICINE

## 2023-08-21 PROCEDURE — 120N000001 HC R&B MED SURG/OB

## 2023-08-21 PROCEDURE — 97535 SELF CARE MNGMENT TRAINING: CPT | Mod: GO

## 2023-08-21 PROCEDURE — 85027 COMPLETE CBC AUTOMATED: CPT | Performed by: FAMILY MEDICINE

## 2023-08-21 PROCEDURE — 83735 ASSAY OF MAGNESIUM: CPT | Performed by: FAMILY MEDICINE

## 2023-08-21 PROCEDURE — 97165 OT EVAL LOW COMPLEX 30 MIN: CPT | Mod: GO

## 2023-08-21 PROCEDURE — 36415 COLL VENOUS BLD VENIPUNCTURE: CPT | Performed by: FAMILY MEDICINE

## 2023-08-21 PROCEDURE — 250N000011 HC RX IP 250 OP 636: Mod: JZ | Performed by: FAMILY MEDICINE

## 2023-08-21 PROCEDURE — 93306 TTE W/DOPPLER COMPLETE: CPT | Mod: 26 | Performed by: INTERNAL MEDICINE

## 2023-08-21 PROCEDURE — 999N000157 HC STATISTIC RCP TIME EA 10 MIN

## 2023-08-21 PROCEDURE — 250N000012 HC RX MED GY IP 250 OP 636 PS 637: Performed by: FAMILY MEDICINE

## 2023-08-21 PROCEDURE — 250N000013 HC RX MED GY IP 250 OP 250 PS 637: Performed by: FAMILY MEDICINE

## 2023-08-21 RX ADMIN — ENTECAVIR 0.5 MG: 0.5 TABLET ORAL at 08:23

## 2023-08-21 RX ADMIN — ACETAMINOPHEN 650 MG: 325 TABLET ORAL at 00:17

## 2023-08-21 RX ADMIN — RIVAROXABAN 15 MG: 15 TABLET, FILM COATED ORAL at 23:00

## 2023-08-21 RX ADMIN — METOPROLOL SUCCINATE 12.5 MG: 25 TABLET, EXTENDED RELEASE ORAL at 08:19

## 2023-08-21 RX ADMIN — DOCUSATE SODIUM 100 MG: 100 CAPSULE, LIQUID FILLED ORAL at 08:19

## 2023-08-21 RX ADMIN — ACETAMINOPHEN 650 MG: 325 TABLET ORAL at 11:08

## 2023-08-21 RX ADMIN — ENOXAPARIN SODIUM 80 MG: 80 INJECTION SUBCUTANEOUS at 03:15

## 2023-08-21 RX ADMIN — ROSUVASTATIN CALCIUM 20 MG: 10 TABLET, FILM COATED ORAL at 08:19

## 2023-08-21 RX ADMIN — MYCOPHENOLATE MOFETIL 750 MG: 250 CAPSULE ORAL at 20:35

## 2023-08-21 RX ADMIN — PREDNISONE 5 MG: 5 TABLET ORAL at 08:19

## 2023-08-21 RX ADMIN — FLUTICASONE FUROATE 1 PUFF: 100 POWDER RESPIRATORY (INHALATION) at 08:16

## 2023-08-21 RX ADMIN — LATANOPROST 1 DROP: 50 SOLUTION OPHTHALMIC at 20:35

## 2023-08-21 RX ADMIN — ENOXAPARIN SODIUM 80 MG: 80 INJECTION SUBCUTANEOUS at 14:06

## 2023-08-21 RX ADMIN — FLUOXETINE 40 MG: 20 CAPSULE ORAL at 08:29

## 2023-08-21 RX ADMIN — PANTOPRAZOLE SODIUM 40 MG: 40 TABLET, DELAYED RELEASE ORAL at 08:19

## 2023-08-21 RX ADMIN — DOCUSATE SODIUM 100 MG: 100 CAPSULE, LIQUID FILLED ORAL at 20:35

## 2023-08-21 RX ADMIN — FLUOXETINE 20 MG: 20 CAPSULE ORAL at 08:29

## 2023-08-21 RX ADMIN — HYDROXYZINE HYDROCHLORIDE 10 MG: 10 TABLET ORAL at 08:29

## 2023-08-21 RX ADMIN — ISOSORBIDE MONONITRATE 60 MG: 60 TABLET, EXTENDED RELEASE ORAL at 08:23

## 2023-08-21 RX ADMIN — PERFLUTREN 2 ML: 6.52 INJECTION, SUSPENSION INTRAVENOUS at 10:36

## 2023-08-21 RX ADMIN — MYCOPHENOLATE MOFETIL 750 MG: 250 CAPSULE ORAL at 08:22

## 2023-08-21 RX ADMIN — FLUTICASONE FUROATE AND VILANTEROL TRIFENATATE 1 PUFF: 100; 25 POWDER RESPIRATORY (INHALATION) at 08:18

## 2023-08-21 RX ADMIN — Medication 1 MG: at 22:59

## 2023-08-21 RX ADMIN — Medication 81 MG: at 08:19

## 2023-08-21 ASSESSMENT — ACTIVITIES OF DAILY LIVING (ADL)
PREVIOUS_RESPONSIBILITIES: MEDICATION MANAGEMENT;MEAL PREP
ADLS_ACUITY_SCORE: 42
ADLS_ACUITY_SCORE: 38
WALKING_OR_CLIMBING_STAIRS_DIFFICULTY: YES
TOILETING_ASSISTANCE: TOILETING DIFFICULTY, ASSISTANCE 1 PERSON
DRESSING/BATHING: BATHING DIFFICULTY, ASSISTANCE 1 PERSON;DRESSING DIFFICULTY, ASSISTANCE 1 PERSON
DRESSING/BATHING_DIFFICULTY: YES
CHANGE_IN_FUNCTIONAL_STATUS_SINCE_ONSET_OF_CURRENT_ILLNESS/INJURY: NO
ADLS_ACUITY_SCORE: 41
ADLS_ACUITY_SCORE: 38
EQUIPMENT_CURRENTLY_USED_AT_HOME: SHOWER CHAIR;GRAB BAR, TUB/SHOWER
DIFFICULTY_EATING/SWALLOWING: NO
WALKING_OR_CLIMBING_STAIRS: AMBULATION DIFFICULTY, ASSISTANCE 1 PERSON;STAIR CLIMBING DIFFICULTY, ASSISTANCE 1 PERSON;TRANSFERRING DIFFICULTY, ASSISTANCE 1 PERSON
TRANSFERRING: 1-->ASSISTANCE (EQUIPMENT/PERSON) NEEDED
DOING_ERRANDS_INDEPENDENTLY_DIFFICULTY: YES
ADLS_ACUITY_SCORE: 35
DRESS: 1-->ASSISTANCE (EQUIPMENT/PERSON) NEEDED
TOILETING_ISSUES: YES
ADLS_ACUITY_SCORE: 44
TOILETING: 1-->ASSISTANCE (EQUIPMENT/PERSON) NEEDED
FALL_HISTORY_WITHIN_LAST_SIX_MONTHS: NO
ADLS_ACUITY_SCORE: 38
DRESS: 1-->ASSISTANCE (EQUIPMENT/PERSON) NEEDED (NOT DEVELOPMENTALLY APPROPRIATE)
ADLS_ACUITY_SCORE: 41
DIFFICULTY_COMMUNICATING: NO
ADLS_ACUITY_SCORE: 41
ADLS_ACUITY_SCORE: 41
CONCENTRATING,_REMEMBERING_OR_MAKING_DECISIONS_DIFFICULTY: NO
TOILETING: 1-->ASSISTANCE (EQUIPMENT/PERSON) NEEDED (NOT DEVELOPMENTALLY APPROPRIATE)
ADLS_ACUITY_SCORE: 38
BATHING: 1-->ASSISTANCE NEEDED
ADLS_ACUITY_SCORE: 37
WEAR_GLASSES_OR_BLIND: NO
TRANSFERRING: 1-->ASSISTANCE (EQUIPMENT/PERSON) NEEDED (NOT DEVELOPMENTALLY APPROPRIATE)

## 2023-08-21 NOTE — PLAN OF CARE
Problem: Plan of Care - These are the overarching goals to be used throughout the patient stay.    Goal: Plan of Care Review  Description: The Plan of Care Review/Shift note should be completed every shift.  The Outcome Evaluation is a brief statement about your assessment that the patient is improving, declining, or no change.  This information will be displayed automatically on your shift note.  Outcome: Progressing   Goal Outcome Evaluation:       Patient stated he became dizzy and lightheaded working with occupational therapy today. He is very deconditioned and weak. No issues with oxygen level.

## 2023-08-21 NOTE — PROGRESS NOTES
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  Found to be hypoxic with pulmonary embolus.      Acute respiratory failure  Pulmonary embolus  ---hypoxia improved  --- Risk factors include recent surgery with immobility.  --- stable telemetry  --- has been on Lovenox.    ---doac covered - transition to that  ---Ef normal - no strain on echo  --- Ultrasound lower extremities negative  --- PTOT due to weakness  ---wean oxygen as able - was SOB and very hypoxic in ED so will check sats again with ambulation    Chronic alcohol use  --- no withrdrawal  ---Sober since August 5 his report  --- hold home Naltrexone for now, just had 1 dose on Friday  ---  CIWA to monitor   --- Cirrhosis seen on CT    Hypomagnesemia  --- improved  ---continue to replace per protocol    Pulmonary nodule/infiltrate --- outpatient CT in 4 to 6 weeks.  --- Question of infiltrate and weakness   ---fairly low procalcitonin  --negative covid    CKD  Renal transplant  Immunosuppression  --- Continue mycophenolate, prednisone    Coronary artery disease  --- Status post CABG   --- Did have Lexiscan with small area of ischemia in June perioperatively, EF 70%  ---continue imdur, asa, toprol, statin    Chronic hepatitis B--continue Entecavir  --check lft in am    GERD - continue protonix    MDD--continue Prozac  --- In one dose Ativan for anxiety in ED.  Continue home hydroxyzine    COPD  --- Doubt contributing to above.  No clear exacerbation  --- Continue home Pulmicort and albuterol         Diet: Combination Diet Regular Diet Adult    DVT Prophylaxis: DOAC  [place] Catheter: Not present  Lines: None    "  Cardiac Monitoring: ACTIVE order. Indication: Electrolyte Imbalance (24 hours)- Magnesium <1.3 mg/ml; Potassium < =2.8 or > 5.5 mg/ml  Code Status: Full Code      Clinically Significant Risk Factors Present on Admission           # Hypercalcemia: corrected calcium is >10.1, will monitor as appropriate  # Hypomagnesemia: Lowest Mg = 1.4 mg/dL in last 2 days, will replace as needed   # Hypoalbuminemia: Lowest albumin = 3.2 g/dL at 8/21/2023  6:31 AM, will monitor as appropriate   # Drug Induced Platelet Defect: home medication list includes an antiplatelet medication   # Hypertension: Noted on problem list      # Overweight: Estimated body mass index is 28.19 kg/m  as calculated from the following:    Height as of this encounter: 1.702 m (5' 7\").    Weight as of this encounter: 81.6 kg (180 lb).              Disposition Plan     Expected Discharge Date: 08/22/2023      Destination: home;home with help/services            Juju Chung MD  Hospitalist Service  [facility]  Securely message with [employer] (more info)  Text page via [employer] Paging/Directory   ______________________________________________________________________    Interval History   Seen today  Weaning off oxygen  Some SOB with walking   Somewhat weak  Had home care before but was ending  Denies chest pain overnight  No leg pain or surgery incision pain    Physical Exam   Vital Signs: Temp: (P) 98  F (36.7  C) Temp src: (P) Oral BP: (P) 125/83 Pulse: 73   Resp: (P) 18 SpO2: (P) 94 % O2 Device: (P) None (Room air) Oxygen Delivery: 2 LPM  Weight: 180 lbs 0 oz    General Appearance: Pleasant,NAD  Respiratory: CTA-B  Cardiovascular: RRR, no murmers  GI: soft, nontender, no hsm or masses  Skin: incision looks ok - no open areas or rashes   Other: Neuro gross intact, no focal deficits appreciated     Medical Decision Making             Data     I have personally reviewed the following data over the past 24 hrs:    5.8  \   11.4 (L)   / 386 "     137 101 6.1 (L) /  117 (H)   5.2 26 0.68 \     ALT: 7 AST: 28 AP: 57 TBILI: 0.7   ALB: 3.2 (L) TOT PROTEIN: 5.9 (L) LIPASE: N/A     Procal: N/A CRP: N/A Lactic Acid: N/A         Imaging results reviewed over the past 24 hrs:   Recent Results (from the past 24 hour(s))   US Lower Extremity Venous Duplex Bilateral    Narrative    EXAM: US LOWER EXTREMITY VENOUS DUPLEX BILATERAL  LOCATION: [facility]  DATE: 2023    INDICATION: PE   recent surgery on right hip  COMPARISON: None.  TECHNIQUE: Venous Duplex ultrasound of bilateral lower extremities with and without compression, augmentation and duplex. Color flow and spectral Doppler with waveform analysis performed.    FINDINGS: Exam includes the common femoral, femoral, popliteal veins as well as segmentally visualized deep calf veins and greater saphenous vein.     RIGHT: No deep vein thrombosis. No superficial thrombophlebitis. No popliteal cyst.    LEFT: No deep vein thrombosis. No superficial thrombophlebitis. No popliteal cyst.      Impression    IMPRESSION:  1.  No deep venous thrombosis in the bilateral lower extremities.   Echocardiogram Complete   Result Value    LVEF  60-65%    Narrative    [id number]  [id number]  [id number]  [id number]^[id number]^[place] [address]     Name: MANSI LIRA  MRN: [id number]  : 1960  Study Date: 2023 10:13 AM  Age: 63 yrs  Gender: Male  Patient Location: [facility]  Reason For Study: Pulmonary Emboli  Ordering Physician: FABRICIO OROZCO  Performed By: CHENTE     BSA: 1.9 m2  Height: 67 in  Weight: 180 lb  HR: 84  BP: 125/83 mmHg  ______________________________________________________________________________  Procedure  Complete Portable Echo Adult. Definity (NDC #96741-397) given intravenously.  2.0ml; lot #[id number]. Poor quality two-dimensional was performed and interpreted.  Adequate quality color and spectral Doppler were performed and  interpreted.  ______________________________________________________________________________  Interpretation Summary     Left ventricular size, wall motion and function are normal. The ejection  fraction is 60-65%.  The right ventricle is mildly dilated.  The right ventricular systolic function is normal.  No hemodynamically significant valvular abnormalities on 2D or color flow  imaging.  ______________________________________________________________________________  Left Ventricle  Left ventricular size, wall motion and function are normal. The ejection  fraction is 60-65%. There is normal left ventricular wall thickness. Left  ventricular diastolic function is normal. No regional wall motion  abnormalities noted.     Right Ventricle  The right ventricle is mildly dilated. The right ventricular systolic function  is normal.     Atria  Normal left atrial size. Right atrial size is normal. There is no color  Doppler evidence of an atrial shunt.     Mitral Valve  Mitral valve leaflets appear normal. There is no evidence of mitral stenosis  or clinically significant mitral regurgitation.     Tricuspid Valve  Tricuspid valve leaflets appear normal. Right ventricular systolic pressure  could not be approximated due to inadequate tricuspid regurgitation.     Aortic Valve  Aortic valve leaflets appear normal. There is no evidence of aortic stenosis  or clinically significant aortic regurgitation.     Pulmonic Valve  The pulmonic valve is not well seen, but is grossly normal. This degree of  valvular regurgitation is within normal limits.     Vessels  The aorta root is normal. Normal size ascending aorta. IVC diameter <2.1 cm  collapsing >50% with sniff suggests a normal RA pressure of 3 mmHg.     Pericardium  There is no pericardial effusion.     ______________________________________________________________________________  MMode/2D Measurements & Calculations  IVSd: 1.2 cm  LVIDd: 4.4 cm  LVIDs: 2.4 cm  LVPWd: 1.3  cm  FS: 44.4 %     LV mass(C)d: 194.1 grams  LV mass(C)dI: 100.4 grams/m2  Ao root diam: 3.3 cm  asc Aorta Diam: 3.7 cm  LVOT diam: 2.2 cm  LVOT area: 3.8 cm2  LA Volume (BP): 39.0 ml  LA Volume Index (BP): 20.2 ml/m2     LA Volume Indexed (AL/bp): 23.0 ml/m2  RWT: 0.57  TAPSE: 2.0 cm     Doppler Measurements & Calculations  MV E max nolan: 50.1 cm/sec  MV A max nolan: 101.0 cm/sec  MV E/A: 0.50  MV dec slope: 146.0 cm/sec2  MV dec time: 0.34 sec  Ao V2 max: 124.0 cm/sec  Ao max P.0 mmHg  Ao V2 mean: 81.3 cm/sec  Ao mean PG: 3.0 mmHg  Ao V2 VTI: 20.3 cm  GENE(I,D): 4.3 cm2  GENE(V,D): 3.6 cm2  LV V1 max P.7 mmHg  LV V1 max: 119.0 cm/sec  LV V1 VTI: 22.7 cm  SV(LVOT): 86.3 ml  SI(LVOT): 44.6 ml/m2  PA V2 max: 75.6 cm/sec  PA max P.3 mmHg  PA acc time: 0.11 sec  AV Nolan Ratio (DI): 0.96  GENE Index (cm2/m2): 2.2     E/E' av.0  Lateral E/e': 7.3  Medial E/e': 6.7  RV S Nolan: 8.7 cm/sec     ______________________________________________________________________________  Report approved by: Dexter Landin 2023 12:10 PM

## 2023-08-21 NOTE — PROGRESS NOTES
"   08/21/23 1047   Appointment Info   Signing Clinician's Name / Credentials (PT) Sisi Staley, PT, DPT   Living Environment   People in Home other (see comments)  (Has roommate)   Current Living Arrangements house   Home Accessibility stairs to enter home;stairs within home   Number of Stairs, Main Entrance 4   Stair Railings, Main Entrance railings safe and in good condition;railing on left side (ascending)   Number of Stairs, Within Home, Primary greater than 10 stairs  (14)   Stair Railings, Within Home, Primary railings safe and in good condition;railing on right side (ascending)   Transportation Anticipated other (see comments)  (Lyft/Uber)   Self-Care   Usual Activity Tolerance good   Equipment Currently Used at Home walker, rolling;cane, straight  (Has been using walker post hip surgery, states he hasn't been using the walker as much recently)   Fall history within last six months no   Activity/Exercise/Self-Care Comment Patient reports previous independence with mobility and ADLs. Has has home care recently following TCU discharge, \"I've had a bath aide\". Does own cooking, cleaning, and laundry. 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  Found to be hypoxic with pulmonary embolus.   Existing Precautions/Restrictions fall   Weight-Bearing Status - LLE weight-bearing as tolerated   Weight-Bearing Status - RLE weight-bearing as tolerated   Cognition "   Affect/Mental Status (Cognition) WFL   Orientation Status (Cognition) oriented x 3   Follows Commands (Cognition) WFL   Range of Motion (ROM)   Range of Motion ROM deficits secondary to weakness   Strength (Manual Muscle Testing)   Strength (Manual Muscle Testing) strength is WFL   Bed Mobility   Bed Mobility supine-sit   Supine-Sit [place] (Bed Mobility) supervision   Impairments Contributing to Impaired Bed Mobility decreased strength   Assistive Device (Bed Mobility) bed rails   Comment, (Bed Mobility) Increased time requried to perform supine to sit transfer   Transfers   Transfers sit-stand transfer;bed-chair transfer   Bed-Chair Transfer   Assistive Device (Bed-Chair Transfers) walker, front-wheeled   Bed-Chair [place] (Transfers) contact guard;1 person to manage equipment   Sit-Stand Transfer   Sit-Stand [place] (Transfers) contact guard;1 person to manage equipment   Assistive Device (Sit-Stand Transfers) walker, front-wheeled   Gait/Stairs (Locomotion)   [place] Level (Gait) contact guard;1 person to manage equipment   Assistive Device (Gait) walker, front-wheeled   Distance in Feet 50   Pattern (Gait) step-to   Deviations/Abnormal Patterns (Gait) antalgic;gait speed decreased;paola decreased;stride length decreased;weight shifting decreased   Comment, (Gait/Stairs) Patient reported a headache upon standing that continued during gait, did not report increased pain from headache during gait. RN notified and aware.   Clinical Impression   Criteria for Skilled Therapeutic Intervention Yes, treatment indicated   PT Diagnosis (PT) Impaired functional mobility   Influenced by the following impairments Weakness   Functional limitations due to impairments Bed mobility, transfers, gait, stairs   Clinical Presentation (PT Evaluation Complexity) Evolving/Changing   Clinical Presentation Rationale Patient presents as medically diagnosed   Clinical Decision Making (Complexity) low complexity    Planned Therapy Interventions (PT) bed mobility training;gait training;home exercise program;neuromuscular re-education;patient/family education;stair training;strengthening;transfer training   Anticipated Equipment Needs at Discharge (PT) walker, rolling   Risk & Benefits of therapy have been explained evaluation/treatment results reviewed;care plan/treatment goals reviewed;participants voiced agreement with care plan;participants included;patient   PT Total Evaluation Time   PT Eval, Low Complexity Minutes (85583) 11   Physical Therapy Goals   PT Frequency 6x/week   PT Predicted Duration/Target Date for Goal Attainment 08/28/23   PT Goals Transfers;Gait;PT Goal 1   PT: Transfers Supervision/stand-by assist;Sit to/from stand;Bed to/from chair;Assistive device   PT: Gait Supervision/stand-by assist;Assistive device;150 feet   PT: Goal 1 Patient will be independent with HEP in order to improve strength and overall independence with mobility   Interventions   Interventions Quick Adds Therapeutic Procedure   Therapeutic Procedure/Exercise   Ther. Procedure: strength, endurance, ROM, flexibillity Minutes (05884) 10   Symptoms Noted During/After Treatment fatigue   Treatment Detail/Skilled Intervention Patient performed seated BLE exercises x 10 with verbal cueing and visual demonstration required for proper technique. Patient seated in bedside chair with call light within reach and chair alarm armed at end of session.   PT Discharge Planning   PT Plan Bed mobility, transfers, gait, LE ex, stairs when able   PT Discharge Recommendation (DC Rec) home;home with home care physical therapy   PT Rationale for DC Rec Patient currently requires SBA - CGA x 1 for bed mobility, transfers, and gait. He lives in a house with a roommate who is unable to provide assistance. He has 4 stairs to enter his home and 14 stairs to reach the second floor of his home; at this time has not attempted stairs. Anticipate patient will be safe to  discharge home with use of an assistive device once medically cleared to do so.   PT Brief overview of current status SBA -CGA for bed mobility, transfers, gait; reported a headache upon standing and during gait, RN notified and aware.   Total Session Time   Timed Code Treatment Minutes 10   Total Session Time (sum of timed and untimed services) 21     Sisi Staley, PT, DPT

## 2023-08-21 NOTE — TELEPHONE ENCOUNTER
Writer reached out to patient for a weekly check-in. No answer at number provided. LVM, requesting a call back. Clinic number provided.

## 2023-08-21 NOTE — PLAN OF CARE
Problem: Pain Chronic (Persistent) (Comorbidity Management)  Goal: Acceptable Pain Control and Functional Ability  Outcome: Progressing  Intervention: Manage Persistent Pain  Recent Flowsheet Documentation  Taken 8/21/2023 0900 by Jennifer Gutierrez RN  Medication Review/Management: medications reviewed   Goal Outcome Evaluation:       Patient had complaints of headache around the lunch hour.  PRN tylenol given with reported good relief.        Problem: Gas Exchange Impaired  Goal: Optimal Gas Exchange  Outcome: Progressing     Patient sats have been within normal limits on room air at rest.  Will check sats with activity.

## 2023-08-21 NOTE — UTILIZATION REVIEW
Admission Status; Secondary Review Determination   Under the authority of the Utilization Management Committee, the utilization review process indicated a secondary review on Jerad Ross. The review outcome is based on review of the medical records, discussions with staff, and applying clinical experience noted on the date of the review.   (x) Inpatient Status Appropriate - This patient's medical care is consistent with medical management for inpatient care and reasonable inpatient medical practice.     RATIONALE FOR DETERMINATION   Jerad Ross is a 63 yr old male with hx right total hip and alcohol abuse with recent resumption of drinking who presented with fatigue and was noted to have acute PE with hypopxia.  Oxygen as low as 84% on RA.  Now off oxygen but unclear if will need resumption or with activity.  Participating with therapy and needs to be able to climb steps with assessing for hypoxia.  Working on transition to DOAC.  Trending CIWA.  Discussed with Dr. Chung.  Needs further assessment for medical stability and discharge needs.    At the time of admission with the information available to the attending physician more than 2 nights Hospital complex care was anticipated, based on patient risk of adverse outcome if treated as outpatient and complex care required. Inpatient admission is appropriate based on the Medicare guidelines.   The information on this document is developed by the utilization review team in order for the business office to ensure compliance. This only denotes the appropriateness of proper admission status and does not reflect the quality of care rendered.   The definitions of Inpatient Status and Observation Status used in making the determination above are those provided in the CMS Coverage Manual, Chapter 1 and Chapter 6, section 70.4.   Sincerely,   Fadia Vu MD  Utilization Review  Physician Advisor  [facility]

## 2023-08-21 NOTE — PROGRESS NOTES
"Care Management Follow Up    Length of Stay (days): 1    Expected Discharge Date: 08/22/2023     Concerns to be Addressed: discharge planning     Patient plan of care discussed at interdisciplinary rounds: Yes    Anticipated Discharge Disposition:  Home with Homecare     Anticipated Discharge Services:  Homecare  Anticipated Discharge DME:  NA    Patient/family educated on Medicare website which has current facility and service quality ratings:  yes  Education Provided on the Discharge Plan:  yes  Patient/Family in Agreement with the Plan:  yes    Referrals Placed by CM/SW:  Homecare  Private pay costs discussed: Not applicable    Additional Information:  RNCM placed call to [facility], stated patient is on services. Left voicemail for Homecare Care Manager, [facility].     Tentatively planning to add RN [facility] PT OT.    Social HX: \"Patient is independent at baseline, lives in his house with a roommate. He is currently receiving home care PT through [facility] (surgery in June). He reports their service ends this week and then he will be going to OP PT. He states that he has set this up but does not recall where it is.      He is working with his county  to get a [place] waiver, stating he is hopeful to get more assist around his home.\"    CM will continue to follow care progression and aide in discharge planning as needed.     Leora Sheldon RN      "

## 2023-08-21 NOTE — PROGRESS NOTES
08/21/23 1500   Appointment Info   Signing Clinician's Name / Credentials (OT) Lizette Parikh OTR/L   Living Environment   Current Living Arrangements house   Living Environment Comments laundry in basement, bed and bath upstairs, bringing urinal downstairs   Self-Care   Usual Activity Tolerance good   Current Activity Tolerance fair   Equipment Currently Used at Home shower chair;grab bar, tub/shower  (standard toilet without grab bars)   Activity/Exercise/Self-Care Comment OT snd PT coming to house on last week of treatment   Instrumental Activities of Daily Living (IADL)   Previous Responsibilities medication management;meal prep  (has not driven in awhile, order ready to eat things, laundry has not been getting done)   IADL Comments yardwork done by roommate   General Information   Onset of Illness/Injury or Date of Surgery 08/20/23   Referring Physician Juju Chung MD   Cognitive Status Examination   Orientation Status orientation to person, place and time   Visual Perception   Visual Impairment/Limitations corrective lenses full-time   Pain Assessment   Patient Currently in Pain No   Bed Mobility   Bed Mobility No deficits identified   Transfers   Transfers sit-stand transfer   Sit-Stand Transfer   Sit-Stand [place] (Transfers) contact guard   Assistive Device (Sit-Stand Transfers) walker, front-wheeled   Balance   Balance Assessment standing balance: dynamic   Balance Comments CGA as fatiques with FWW, leg discomfort, states he plans to get orthotic as leg discrepancy   Activities of Daily Living   BADL Assessment/Intervention lower body dressing;toileting;grooming   Lower Body Dressing Assessment/Training   Position (Lower Body Dressing) edge of bed sitting   [place] Level (Lower Body Dressing) other (see comments);dependent (less than 25% patient effort)  (use equipment at home due to decreased flexibility at hip)   Grooming Assessment/Training   Position (Grooming) supported standing    [place] Level (Grooming) supervision   Toileting   Assistive Devices (Toileting) raised toilet seat;grab bar, toilet   [place] Level (Toileting) supervision   Clinical Impression   Criteria for Skilled Therapeutic Interventions Met (OT) Yes, treatment indicated   OT Diagnosis decreased functional endurance due to hypoxia   OT Problem List-Impairments impacting ADL activity tolerance impaired;balance;pain;strength;mobility   Assessment of Occupational Performance 1-3 Performance Deficits   Planned Therapy Interventions (OT) ADL retraining;balance training;progressive activity/exercise;transfer training;strengthening   Clinical Decision Making Complexity (OT) low complexity   Anticipated Equipment Needs Upon Discharge (OT) walker, rolling;shower chair;reacher;dressing equipment   Risk & Benefits of therapy have been explained evaluation/treatment results reviewed;patient   Clinical Impression Comments Pt. demonstrates decreased ADL endurance, complaints of fatique with limited endurance, wanting to sleep, concern about managing at home. Recommend continued OT to progress ADL's for safe return home with support services.   OT Total Evaluation Time   OT Eval, Low Complexity Minutes (07207) 30   OT Goals   Therapy Frequency (OT) Daily   OT Goals Lower Body Dressing;Upper Body Bathing;Lower Body Bathing   OT: Lower Body Dressing Modified independent   OT: Upper Body Bathing Modified independent   OT: Lower Body Bathing Modified independent   Self-Care/Home Management   Self-Care/Home Mgmt/ADL, Compensatory, Meal Prep Minutes (75120) 10   Symptoms Noted During/After Treatment (Meal Preparation/Planning Training) increased pain;fatigue   Treatment Detail/Skilled Intervention agreed to further functional mobility in room and frank to determine functional endurance. CGA with  ft. with pain in R leg, wanting to return to bed. Oxygen on room air 92-97%, /71- complaint of dizziness.   OT Discharge  Planning   OT Plan Further endurance ADL for determination of safe return home   OT Discharge Recommendation (DC Rec) (S)  home with home care occupational therapy;home with assist   OT Rationale for DC Rec Decreased functional endurance, does not feel safe to return home due to level of fatigue   OT Brief overview of current status SBA toileting, G/H and CGA functional mobility with FWW household distance with discomfort and fatique   Total Session Time   Timed Code Treatment Minutes 10   Total Session Time (sum of timed and untimed services) 40

## 2023-08-21 NOTE — PROGRESS NOTES
"CLINICAL NUTRITION SERVICES - ASSESSMENT NOTE     Nutrition Prescription    RECOMMENDATIONS FOR MDs/PROVIDERS TO ORDER:      Malnutrition Status:    Not noted    Recommendations already ordered by Registered Dietitian (RD):      Future/Additional Recommendations:  Intake.     REASON FOR ASSESSMENT  Jerad Ross is a/an 63 year old male assessed by the dietitian for Admission Nutrition Risk Screen for \"Unsure\" wt loss, decreased intake.    NUTRITION HISTORY  Patient with history of kidney transplants, hep B.  Patient reports that he has been eating less lately though unsure of weights.  Patient used to make meals though now relies more on easy to fix or heat meals from the grocery store.  He drinks [employer] protein milk.      CURRENT NUTRITION ORDERS  Diet: Regular  Intake/Tolerance: One meal recorded in chart:  100%.      LABS  Labs reviewed    MEDICATIONS   aspirin  81 mg Oral Daily    docusate sodium  100 mg Oral BID    enoxaparin ANTICOAGULANT  1 mg/kg Subcutaneous Q12H    entecavir  0.5 mg Oral Daily    FLUoxetine  20 mg Oral Daily    FLUoxetine  40 mg Oral Daily    fluticasone  1 puff Inhalation Daily    fluticasone-vilanterol  1 puff Inhalation Daily    isosorbide mononitrate  60 mg Oral Daily    latanoprost  1 drop Both Eyes At Bedtime    metoprolol succinate ER  12.5 mg Oral Daily    mycophenolate  750 mg Oral BID    pantoprazole  40 mg Oral Daily    predniSONE  5 mg Oral Daily    rosuvastatin  20 mg Oral Daily    sodium chloride (PF)  3 mL Intracatheter Q8H       - MEDICATION INSTRUCTIONS -        acetaminophen **OR** acetaminophen, albuterol, bisacodyl, hydrOXYzine, lidocaine 4%, lidocaine (buffered or not buffered), melatonin, nitroGLYcerin, ondansetron **OR** ondansetron, - MEDICATION INSTRUCTIONS -, sodium chloride (PF)   Medications reviewed    ANTHROPOMETRICS  Height: 170.2 cm (5' 7\")  Most Recent Weight: 81.6 kg (180 lb)    BMI: Overweight BMI 25-29.9  Weight History:   Wt Readings from Last 14 " Encounters:   08/20/23 81.6 kg (180 lb)   08/11/23 81.6 kg (180 lb)   07/25/23 77.1 kg (170 lb)   07/05/23 78.6 kg (173 lb 3.2 oz)   06/30/23 80.1 kg (176 lb 8 oz)   06/27/23 82.1 kg (180 lb 14.4 oz)   06/19/23 80.7 kg (178 lb)   06/15/23 81.7 kg (180 lb 3.2 oz)   06/12/23 81.9 kg (180 lb 8 oz)   05/17/23 82.1 kg (181 lb)   04/25/23 83.2 kg (183 lb 6.4 oz)   04/21/23 83.2 kg (183 lb 6.4 oz)   04/18/23 83.2 kg (183 lb 6.4 oz)   04/14/23 83.2 kg (183 lb 6.4 oz)   No significant wt loss found.      Dosing Weight: 81.6 kg    ASSESSED NUTRITION NEEDS  Estimated Energy Needs: 1630+ kcals/day (20+ kcals/kg)  Justification: Overweight  Estimated Protein Needs: 73-90 grams protein/day (0.9- 1.1 grams of pro/kg)  Justification: Increased needs  Estimated Fluid Needs: 1630+ mL/day (1 mL/kcal)   Justification: Maintenance    PHYSICAL FINDINGS  See malnutrition section below.  Last BM-preadmit.       MALNUTRITION:  % Weight Loss:  Weight loss does not meet criteria for malnutrition   % Intake:  <75% for > 7 days (moderate malnutrition)  Subcutaneous Fat Loss:  None observed  Muscle Loss:  None observed  Fluid Retention:  None noted    Malnutrition Diagnosis: Patient does not meet two of the above criteria necessary for diagnosing malnutrition      NUTRITION DIAGNOSIS  No nutrition diagnosis at this time      INTERVENTIONS  Implementation  Nutrition Education: we discussed protein foods and supplements   Pt declines protein supplements at this time.     Goals  Patient to consume % of nutritionally adequate meals three times per day, or the equivalent with supplements/snacks.     Monitoring/Evaluation  Progress toward goals will be monitored and evaluated per protocol.

## 2023-08-21 NOTE — PLAN OF CARE
Problem: Plan of Care - These are the overarching goals to be used throughout the patient stay.    Goal: Optimal Comfort and Wellbeing  Outcome: Progressing     Problem: Plan of Care - These are the overarching goals to be used throughout the patient stay.    Goal: Absence of Hospital-Acquired Illness or Injury  Intervention: Identify and Manage Fall Risk  Recent Flowsheet Documentation  Taken 8/21/2023 0230 by Radha Julian RN  Safety Promotion/Fall Prevention:   activity supervised   lighting adjusted   safety round/check completed  Intervention: Prevent Skin Injury  Recent Flowsheet Documentation  Taken 8/21/2023 0230 by Radha Julian RN  Body Position: position changed independently   Goal Outcome Evaluation:  Pt alert and oriented x4. Reports pain 4/10 to the shoulder. PRN tylenol and Ice applied, effective per pt.  Urinal at bedside. Fall precautions in place and bed alarm is on. Active order for cardiac monitoring. Sinus with Irregular rhythm.

## 2023-08-21 NOTE — PROGRESS NOTES
Type of Form Received:     Form Received (Date) 8/21/23   Form Filled out Yes, faxed 8/29/23   Placed in provider folder Yes

## 2023-08-22 ENCOUNTER — DOCUMENTATION ONLY (OUTPATIENT)
Dept: INTERNAL MEDICINE | Facility: CLINIC | Age: 63
End: 2023-08-22

## 2023-08-22 ENCOUNTER — APPOINTMENT (OUTPATIENT)
Dept: OCCUPATIONAL THERAPY | Facility: HOSPITAL | Age: 63
DRG: 299 | End: 2023-08-22
Payer: COMMERCIAL

## 2023-08-22 VITALS
SYSTOLIC BLOOD PRESSURE: 117 MMHG | HEIGHT: 67 IN | RESPIRATION RATE: 20 BRPM | WEIGHT: 180 LBS | DIASTOLIC BLOOD PRESSURE: 63 MMHG | BODY MASS INDEX: 28.25 KG/M2 | TEMPERATURE: 98 F | HEART RATE: 69 BPM | OXYGEN SATURATION: 92 %

## 2023-08-22 LAB
HOLD SPECIMEN: NORMAL
MAGNESIUM SERPL-MCNC: 1.8 MG/DL (ref 1.7–2.3)

## 2023-08-22 PROCEDURE — 97535 SELF CARE MNGMENT TRAINING: CPT | Mod: GO

## 2023-08-22 PROCEDURE — 83735 ASSAY OF MAGNESIUM: CPT | Performed by: FAMILY MEDICINE

## 2023-08-22 PROCEDURE — 250N000012 HC RX MED GY IP 250 OP 636 PS 637: Performed by: FAMILY MEDICINE

## 2023-08-22 PROCEDURE — 250N000013 HC RX MED GY IP 250 OP 250 PS 637: Performed by: FAMILY MEDICINE

## 2023-08-22 PROCEDURE — 36415 COLL VENOUS BLD VENIPUNCTURE: CPT | Performed by: FAMILY MEDICINE

## 2023-08-22 PROCEDURE — 99239 HOSP IP/OBS DSCHRG MGMT >30: CPT | Performed by: FAMILY MEDICINE

## 2023-08-22 RX ORDER — ASPIRIN 81 MG/1
81 TABLET ORAL DAILY
Start: 2023-08-22

## 2023-08-22 RX ADMIN — FLUOXETINE 20 MG: 20 CAPSULE ORAL at 10:03

## 2023-08-22 RX ADMIN — ISOSORBIDE MONONITRATE 60 MG: 60 TABLET, EXTENDED RELEASE ORAL at 10:03

## 2023-08-22 RX ADMIN — FLUOXETINE 40 MG: 20 CAPSULE ORAL at 10:02

## 2023-08-22 RX ADMIN — METOPROLOL SUCCINATE 12.5 MG: 25 TABLET, EXTENDED RELEASE ORAL at 10:04

## 2023-08-22 RX ADMIN — FLUTICASONE FUROATE 1 PUFF: 100 POWDER RESPIRATORY (INHALATION) at 10:05

## 2023-08-22 RX ADMIN — RIVAROXABAN 15 MG: 15 TABLET, FILM COATED ORAL at 10:04

## 2023-08-22 RX ADMIN — MYCOPHENOLATE MOFETIL 750 MG: 250 CAPSULE ORAL at 10:04

## 2023-08-22 RX ADMIN — ROSUVASTATIN CALCIUM 20 MG: 10 TABLET, FILM COATED ORAL at 10:02

## 2023-08-22 RX ADMIN — PREDNISONE 5 MG: 5 TABLET ORAL at 10:02

## 2023-08-22 RX ADMIN — ENTECAVIR 0.5 MG: 0.5 TABLET ORAL at 10:04

## 2023-08-22 RX ADMIN — PANTOPRAZOLE SODIUM 40 MG: 40 TABLET, DELAYED RELEASE ORAL at 08:39

## 2023-08-22 RX ADMIN — DOCUSATE SODIUM 100 MG: 100 CAPSULE, LIQUID FILLED ORAL at 10:02

## 2023-08-22 RX ADMIN — FLUTICASONE FUROATE AND VILANTEROL TRIFENATATE 1 PUFF: 100; 25 POWDER RESPIRATORY (INHALATION) at 10:05

## 2023-08-22 RX ADMIN — HYDROXYZINE HYDROCHLORIDE 10 MG: 10 TABLET ORAL at 01:15

## 2023-08-22 ASSESSMENT — ACTIVITIES OF DAILY LIVING (ADL)
ADLS_ACUITY_SCORE: 42

## 2023-08-22 NOTE — PROGRESS NOTES
Care Management Discharge Note    Discharge Date: 08/22/2023       Discharge Disposition: Home, Home Care    Discharge Services: None    Discharge DME: None    Discharge Transportation: other (see comments) (Lyft/Uber)    Private pay costs discussed: Not applicable    Does the patient's insurance plan have a 3 day qualifying hospital stay waiver?  No    PAS Confirmation Code:    Patient/family educated on Medicare website which has current facility and service quality ratings: yes    Education Provided on the Discharge Plan: Yes  Persons Notified of Discharge Plans: Patient - Per Team   Patient/Family in Agreement with the Plan: yes    Handoff Referral Completed: Yes    Additional Information:  Patient to discharge home with homecare Thi Rios, RN [facility] PT OT.    RNCM notified patient needing a ride discussed mobility, RN to provide a cab voucher.     Leora Sheldon RN

## 2023-08-22 NOTE — PLAN OF CARE
Occupational Therapy Discharge Summary    Reason for therapy discharge:    Discharged to home with home therapy.    Progress towards therapy goal(s). See goals on Care Plan in [facility] electronic health record for goal details.  Goals met    Therapy recommendation(s):    No further therapy is recommended.

## 2023-08-22 NOTE — PLAN OF CARE
Goal Outcome Evaluation:    Patient laying in room on bed awaiting discharge. States he should be able to navigate the stairs at home. Arranged a cab for transportation per pt. Patient is alert and oriented. Denies SOB or chest or pain with DB. IV removed and pt tolerated. Patient uses a front wheeled walker, shower chair and grab bars at home. Will be seeing PT, OT and have nursing services. Received AM medications. Offers no complaints at this time.

## 2023-08-22 NOTE — TELEPHONE ENCOUNTER
8/22/2023    Left  regarding ROBERT appt, asked pt to return this call and left my number.     [facility] [place] - Patient Navigator   [email or web address]  Direct phone: [phone]

## 2023-08-22 NOTE — DISCHARGE SUMMARY
"[facility]  Hospitalist Discharge Summary      Date of Admission:  8/20/2023  Date of Discharge:  8/22/2023  2:19 PM  Discharging Provider: Juju Chung MD  Discharge Service: Hospitalist Service    Discharge Diagnoses     Pulmonary embolus, provoked.  Suspect due to recent surgery.  Acute respiratory failure, resolved.  Secondary to above  Chronic alcohol use, recent sobriety.  No withdrawal while here  Pulmonary nodules; needs outpatient CT in 4 to 6 weeks  CKD/renal transplant on immunosuppression  Coronary artery disease  Chronic hepatitis B  GERD.    Major depressive disorder  COPD, no exacerbation      Clinically Significant Risk Factors     # Overweight: Estimated body mass index is 28.19 kg/m  as calculated from the following:    Height as of this encounter: 1.702 m (5' 7\").    Weight as of this encounter: 81.6 kg (180 lb).       Follow-ups Needed After Discharge   Follow-up Appointments     Follow-up and recommended labs and tests       Follow up with primary care provider, Aston Perry, within 7   days to evaluate treatment change and for hospital follow- up.  Will need   to get refills or xarelto through him  Follow up chest CT in 4-6 weeks to look at RUL opacity              Discharge Disposition   Discharged to home with home care  Condition at discharge: Stable    Hospital Course   Jerad Ross is a 63 year old male with history right total hip revision 6/2023 as came into the emergency room department with ongoing fatigue and unable to care for self.  Found to be hypoxic with pulmonary embolus.  Patient was treated with Lovenox and oxygen and weaned off oxygen quickly.  Although has history of alcohol abuse no evidence for withdrawal.  Was weaned onto DOAC therapy and will hold his home aspirin while on it.  PT OT recommended home therapies for him.  Although was very hypoxic in the emergency room department weaned off oxygen quickly and was able to maintain " saturations even with ambulation.  Due to questionable pulmonary nodule on CT needs CT for follow-up of this was explained to patient.  He will hold his aspirin initially while on anticoagulation but could resume this when done with DOAC therapy or per discretion of primary care.  Discharged in good condition.    Acute respiratory failure-resolved  Pulmonary embolus  ---hypoxia improved  --- Risk factors include recent surgery with immobility.  --- stable telemetry  --- has been on Lovenox.    ---doac covered - transition to that  ---Ef normal - no strain on echo  --- Ultrasound lower extremities negative    Chronic alcohol use  --- no withrdrawal  ---Sober since August 5 his report  --- hold home Naltrexone for now, just had 1 dose on Friday  ---  CIWA to monitor   --- Cirrhosis seen on CTl    Pulmonary nodule/infiltrate --- outpatient CT in 4 to 6 weeks.  --- Question of infiltrate and weakness   ---fairly low procalcitonin  --negative covid    Consultations This Hospital Stay   CARE MANAGEMENT / SOCIAL WORK IP CONSULT  PHARMACY IP CONSULT  PHYSICAL THERAPY ADULT IP CONSULT  OCCUPATIONAL THERAPY ADULT IP CONSULT    Code Status   Full Code    Time Spent on this Encounter   I, Juju Chung MD, personally saw the patient today and spent greater than 30 minutes discharging this patient.       Juju Chung MD  [address] [address]  Phone: [phone]  Fax: [phone]  ______________________________________________________________________    Physical Exam   Vital Signs: Temp: 98  F (36.7  C) Temp src: Oral BP: 117/63 Pulse: 69   Resp: 20 SpO2: 92 % O2 Device: None (Room air)    Weight: 180 lbs 0 oz  General Appearance: Pleasant male, no apparent distress  Respiratory: Clear to auscultation  Cardiovascular: Regular rate and rhythm  GI: Soft and nontender without hepatosplenomegaly  Skin: No significant lower extremity edema  Other: Neurologically gross intact  without focal deficits appreciated       Primary Care Physician   Aston Perry    Discharge Orders      Home Care Referral      Reason for your hospital stay    Pulmonary embolus     Activity    Your activity upon discharge: activity as tolerated     Follow-up and recommended labs and tests     Follow up with primary care provider, Aston Perry, within 7 days to evaluate treatment change and for hospital follow- up.  Will need to get refills or xarelto through him  Follow up chest CT in 4-6 weeks to look at RUL opacity     Diet    Follow this diet upon discharge: Orders Placed This Encounter      Combination Diet Regular Diet Adult       Significant Results and Procedures   Most Recent 3 CBC's:  Recent Labs   Lab Test 08/21/23  0631 08/20/23  0939 08/11/23  1453   WBC 5.8 8.4 8.4   HGB 11.4* 12.5* 11.4*   MCV 89 89 89    405 379     Most Recent 3 BMP's:  Recent Labs   Lab Test 08/21/23  0631 08/20/23  0939 08/11/23  1453    139 142   POTASSIUM 5.2 3.7 3.3*   CHLORIDE 101 99 106   CO2 26 24 23   BUN 6.1* 6.5* 5.7*   CR 0.68 0.70 0.65*   ANIONGAP 10 16* 13   HEMA 9.5 9.5 9.0   * 77 94     Most Recent INR's and Anticoagulation Dosing History:  Anticoagulation Dose History  More data exists         Latest Ref Rng & Units 3/13/2018 5/28/2020 6/3/2020 6/8/2020   Recent Dosing and Labs   INR 0.85 - 1.15 0.95  0.98  1.05  1.43  1.66          6/9/2020 4/12/2021 7/19/2022   Recent Dosing and Labs   INR 1.36  1.03  1.04    ,   Results for orders placed or performed during the hospital encounter of 08/20/23   CT Chest Pulmonary Embolism w Contrast     Value    Radiologist flags New diagnosis of pulmonary embolism (AA)    Narrative    EXAM: CT CHEST PULMONARY EMBOLISM W CONTRAST  LOCATION: [facility]  DATE: 8/20/2023    INDICATION: dyspnea  COMPARISON: CT chest 8/2/2021 and 6/7/2018  TECHNIQUE: CT chest pulmonary angiogram during arterial phase injection of IV  contrast. Multiplanar reformats and MIP reconstructions were performed. Dose reduction techniques were used.   CONTRAST: isovue 370  63ml    FINDINGS:  ANGIOGRAM CHEST: Nonocclusive pulmonary artery emboli within right lower lobe segmental and subsegmental branches for example image 139 series 4 and image 55 series 9. No central or left-sided pulmonary artery embolism. Borderline dilated ascending   thoracic aorta measuring 4.0 cm in diameter. No thoracic aortic dissection. The exam is limited due to respiratory motion. No CT evidence of right heart strain.    LUNGS AND PLEURA: The central airways are clear. Irregular part solid opacity peripheral right upper lobe measuring 1.3 x 2.0 cm. Mosaic lung attenuation suggesting air trapping. Bibasilar atelectasis/scarring. No pleural effusion.    MEDIASTINUM/AXILLAE: Sternotomy. No thoracic adenopathy. Upper normal heart size. No pericardial effusion.    CORONARY ARTERY CALCIFICATION: Previous intervention (stents or CABG).    UPPER ABDOMEN: Nodular liver surface contour and relative hypertrophy of the lateral left lobe suggesting cirrhosis. 8 mm fluid density inferior segment 6 hepatic lesion is most likely a cyst. 5 mm liver dome lesion image 179 is likely an additional   cyst. Recommend attention on follow-up. Stable left upper quadrant splenosis. Severe native right kidney atrophy. Nonvisualized left kidney.    MUSCULOSKELETAL: Right scapular bone islands. Stable severe degenerative changes of the left glenohumeral joint moderate right glenohumeral joint degenerative changes. These findings could reflect underlying inflammatory arthritis.      Impression    IMPRESSION:  1.  Right lower lobe segmental and subsegmental pulmonary artery emboli. Overall mild clot burden. No central or definite left-sided pulmonary artery embolism. No right heart strain. Note that this exam is limited due to respiratory motion.  2.  Peripheral right upper lobe 2 cm part solid irregular  opacity. This is favored to reflect mild pneumonic infiltrate rather than a mass. As a precaution, recommend short interval CT follow-up in 4-6 weeks.  3.  Cirrhotic liver morphology.          [Critical Result: New diagnosis of pulmonary embolism]    Finding was identified on 2023 1:37 PM CDT.     Dr. Filemon Donald was contacted by me on 2023 2:00 PM CDT and verbalized understanding of the critical result.    US Lower Extremity Venous Duplex Bilateral    Narrative    EXAM: US LOWER EXTREMITY VENOUS DUPLEX BILATERAL  LOCATION: [facility]  DATE: 2023    INDICATION: PE   recent surgery on right hip  COMPARISON: None.  TECHNIQUE: Venous Duplex ultrasound of bilateral lower extremities with and without compression, augmentation and duplex. Color flow and spectral Doppler with waveform analysis performed.    FINDINGS: Exam includes the common femoral, femoral, popliteal veins as well as segmentally visualized deep calf veins and greater saphenous vein.     RIGHT: No deep vein thrombosis. No superficial thrombophlebitis. No popliteal cyst.    LEFT: No deep vein thrombosis. No superficial thrombophlebitis. No popliteal cyst.      Impression    IMPRESSION:  1.  No deep venous thrombosis in the bilateral lower extremities.   Echocardiogram Complete     Value    LVEF  60-65%    Narrative    [id number]  [id number]  [id number]  [id number]^[id number]^[place] [address]     Name: MANSI LIRA  MRN: [id number]  : 1960  Study Date: 2023 10:13 AM  Age: 63 yrs  Gender: Male  Patient Location: [facility]  Reason For Study: Pulmonary Emboli  Ordering Physician: FABRICIO OROZCO  Performed By: CHENTE     BSA: 1.9 m2  Height: 67 in  Weight: 180 lb  HR: 84  BP: 125/83 mmHg  ______________________________________________________________________________  Procedure  Complete Portable Echo Adult. Definity (NDC #93328-427) given intravenously.  2.0ml; lot #[id number].  Poor quality two-dimensional was performed and interpreted.  Adequate quality color and spectral Doppler were performed and interpreted.  ______________________________________________________________________________  Interpretation Summary     Left ventricular size, wall motion and function are normal. The ejection  fraction is 60-65%.  The right ventricle is mildly dilated.  The right ventricular systolic function is normal.  No hemodynamically significant valvular abnormalities on 2D or color flow  imaging.  ______________________________________________________________________________  Left Ventricle  Left ventricular size, wall motion and function are normal. The ejection  fraction is 60-65%. There is normal left ventricular wall thickness. Left  ventricular diastolic function is normal. No regional wall motion  abnormalities noted.     Right Ventricle  The right ventricle is mildly dilated. The right ventricular systolic function  is normal.     Atria  Normal left atrial size. Right atrial size is normal. There is no color  Doppler evidence of an atrial shunt.     Mitral Valve  Mitral valve leaflets appear normal. There is no evidence of mitral stenosis  or clinically significant mitral regurgitation.     Tricuspid Valve  Tricuspid valve leaflets appear normal. Right ventricular systolic pressure  could not be approximated due to inadequate tricuspid regurgitation.     Aortic Valve  Aortic valve leaflets appear normal. There is no evidence of aortic stenosis  or clinically significant aortic regurgitation.     Pulmonic Valve  The pulmonic valve is not well seen, but is grossly normal. This degree of  valvular regurgitation is within normal limits.     Vessels  The aorta root is normal. Normal size ascending aorta. IVC diameter <2.1 cm  collapsing >50% with sniff suggests a normal RA pressure of 3 mmHg.     Pericardium  There is no pericardial effusion.      ______________________________________________________________________________  MMode/2D Measurements & Calculations  IVSd: 1.2 cm  LVIDd: 4.4 cm  LVIDs: 2.4 cm  LVPWd: 1.3 cm  FS: 44.4 %     LV mass(C)d: 194.1 grams  LV mass(C)dI: 100.4 grams/m2  Ao root diam: 3.3 cm  asc Aorta Diam: 3.7 cm  LVOT diam: 2.2 cm  LVOT area: 3.8 cm2  LA Volume (BP): 39.0 ml  LA Volume Index (BP): 20.2 ml/m2     LA Volume Indexed (AL/bp): 23.0 ml/m2  RWT: 0.57  TAPSE: 2.0 cm     Doppler Measurements & Calculations  MV E max nolan: 50.1 cm/sec  MV A max nolan: 101.0 cm/sec  MV E/A: 0.50  MV dec slope: 146.0 cm/sec2  MV dec time: 0.34 sec  Ao V2 max: 124.0 cm/sec  Ao max P.0 mmHg  Ao V2 mean: 81.3 cm/sec  Ao mean PG: 3.0 mmHg  Ao V2 VTI: 20.3 cm  GENE(I,D): 4.3 cm2  GENE(V,D): 3.6 cm2  LV V1 max P.7 mmHg  LV V1 max: 119.0 cm/sec  LV V1 VTI: 22.7 cm  SV(LVOT): 86.3 ml  SI(LVOT): 44.6 ml/m2  PA V2 max: 75.6 cm/sec  PA max P.3 mmHg  PA acc time: 0.11 sec  AV Nolan Ratio (DI): 0.96  GENE Index (cm2/m2): 2.2     E/E' av.0  Lateral E/e': 7.3  Medial E/e': 6.7  RV S Nolan: 8.7 cm/sec     ______________________________________________________________________________  Report approved by: Dexter Landin 2023 12:10 PM           *Note: Due to a large number of results and/or encounters for the requested time period, some results have not been displayed. A complete set of results can be found in Results Review.       Discharge Medications   Discharge Medication List as of 2023  1:37 PM        START taking these medications    Details   Rivaroxaban ANTICOAGULANT 15 & 20 MG TBPK Starter Therapy Pack Take 15 mg by mouth 2 times daily (with meals) for 21 days, THEN 20 mg daily with food for 9 days., Disp-51 each, R-0, E-Prescribe           CONTINUE these medications which have CHANGED    Details   aspirin 81 MG EC tablet Take 1 tablet (81 mg) by mouth daily, No Print OutHold while on xarelto           CONTINUE these medications  which have NOT CHANGED    Details   acetaminophen (TYLENOL) 500 MG tablet Take 1,000 mg by mouth 3 times daily as needed for mild pain, Historical      albuterol (PROAIR HFA) 108 (90 Base) MCG/ACT inhaler Inhale 2 puffs into the lungs every 6 hours as needed for shortness of breath / dyspnea or wheezing, Disp-1 g, R-11, E-PrescribePharmacy may dispense brand covered by insurance (Proair, or proventil or ventolin or generic albuterol inhaler)      budesonide (PULMICORT FLEXHALER) 90 MCG/ACT inhaler Inhale 2 puffs into the lungs 2 times daily, Disp-3 each, R-2, E-Prescribe      calcium citrate-vitamin D (CITRACAL) 315-200 MG-UNIT TABS Take 1 tablet by mouth daily., Historical      entecavir (BARACLUDE) 0.5 MG tablet Take 1 tablet (0.5 mg) by mouth daily, Disp-90 tablet, R-1, E-Prescribe      !! FLUoxetine (PROZAC) 20 MG capsule Take 1 capsule (20 mg) by mouth daily With 40mg for a total daily dose of 60mg, Disp-30 capsule, R-1, E-Prescribe      !! FLUoxetine (PROZAC) 40 MG capsule Take 1 capsule (40 mg) by mouth daily, Disp-90 capsule, R-2, E-Prescribe      fluticasone-salmeterol (ADVAIR) 250-50 MCG/ACT inhaler Inhale 1 puff into the lungs 2 times daily as needed (PRN), Disp-180 each, R-3, No Print Out      furosemide (LASIX) 20 MG tablet Take 1 tablet (20 mg) by mouth daily as needed (fluid retention), Disp-90 tablet, R-1, E-Prescribe      hydrOXYzine (ATARAX) 10 MG tablet Take 1 tablet (10 mg) by mouth every 8 hours as needed for anxiety, Disp-60 tablet, R-1, E-Prescribe      isosorbide mononitrate (IMDUR) 60 MG 24 hr tablet Take 60 mg by mouth daily, Historical      latanoprost (XALATAN) 0.005 % ophthalmic solution Place 1 drop into both eyes At Bedtime, Disp-2.5 mL, R-11, E-Prescribe      metoprolol succinate ER (TOPROL XL) 25 MG 24 hr tablet Take 0.5 tablets (12.5 mg) by mouth daily, Disp-15 tablet, R-0, E-Prescribe      Multiple Vitamins-Iron (ONE DAILY MULTIVITAMIN/IRON) TABS Take 1 tablet by mouth daily,  Historical      mycophenolate (GENERIC EQUIVALENT) 250 MG capsule Take 3 capsules (750 mg) by mouth 2 times daily, Disp-540 capsule, R-3, E-Prescribe      naltrexone (DEPADE/REVIA) 50 MG tablet Take 1 tablet (50 mg) by mouth daily Take 1/2 tablet (25 mg) daily for 1 week, then take 1 tablet (50 mg ) daily., Disp-30 tablet, R-0, E-Prescribe      nitroGLYcerin (NITROSTAT) 0.4 MG sublingual tablet Place 1 tablet (0.4 mg) under the tongue every 5 minutes as needed for chest pain, Disp-20 tablet, R-3, E-Prescribe      Omega-3 Fatty Acids (OMEGA 3 PO) Take 1 capsule by mouth daily Dose unknown, Historical      pantoprazole (PROTONIX) 40 MG EC tablet Take 1 tablet (40 mg) by mouth daily, Disp-90 tablet, R-3, E-Prescribe      polyethylene glycol (MIRALAX) 17 g packet Take 17 g by mouth daily as needed , Historical      predniSONE (DELTASONE) 5 MG tablet Take 1 tablet (5 mg) by mouth daily, Disp-90 tablet, R-3, E-Prescribe      rosuvastatin (CRESTOR) 20 MG tablet TAKE 1 TABLET(20 MG) BY MOUTH DAILY, Disp-90 tablet, R-3, E-Prescribe      tacrolimus (PROTOPIC) 0.1 % external ointment Apply topically 2 times daily as neededHistorical       !! - Potential duplicate medications found. Please discuss with provider.        Allergies   Allergies   Allergen Reactions    Penicillins Shortness Of Breath and Hives    Keflex [Cephalexin Hcl] Unknown     Patient could not recall reaction    Sulfa Antibiotics Rash    Tetracycline Unknown     Patient could not recall reaction

## 2023-08-22 NOTE — PROGRESS NOTES
Type of Form Received:     Form Received (Date) 8/22/23   Form Filled out Yes, faxed 8/29/23 AV   Placed in provider folder Yes

## 2023-08-22 NOTE — PLAN OF CARE
Physical Therapy Discharge Summary    Reason for therapy discharge:    Discharged to home with home therapy.    Progress towards therapy goal(s). See goals on Care Plan in [facility] electronic health record for goal details.  Goals partially met.  Barriers to achieving goals:   discharge from facility.    Therapy recommendation(s):    Continued therapy is recommended.  Rationale/Recommendations:  continued progression of independence with mobility.

## 2023-08-22 NOTE — PLAN OF CARE
Problem: Plan of Care - These are the overarching goals to be used throughout the patient stay.    Goal: Absence of Hospital-Acquired Illness or Injury  Intervention: Identify and Manage Fall Risk  Recent Flowsheet Documentation  Taken 8/21/2023 2300 by Isamar Song RN  Safety Promotion/Fall Prevention: patient and family education  Taken 8/21/2023 2044 by Isamar Song RN  Safety Promotion/Fall Prevention: patient and family education  Intervention: Prevent Skin Injury  Recent Flowsheet Documentation  Taken 8/21/2023 2300 by Isamar Song RN  Body Position:   position maintained   position changed independently  Taken 8/21/2023 2044 by Isamar Song RN  Body Position:   position maintained   position changed independently  Goal: Readiness for Transition of Care  Intervention: Mutually Develop Transition Plan  Recent Flowsheet Documentation  Taken 8/21/2023 2000 by Isamar Song RN  Equipment Currently Used at Home:   shower chair   grab bar, tub/shower   Goal Outcome Evaluation:  No significant events or changes overnight. Jerad A&Ox4. Room air. Denies pain. Sinus dysrhythmia on tele. Did not ambulate this shift.                       14

## 2023-08-24 ENCOUNTER — TELEPHONE (OUTPATIENT)
Dept: INTERNAL MEDICINE | Facility: CLINIC | Age: 63
End: 2023-08-24

## 2023-08-24 ENCOUNTER — TELEPHONE (OUTPATIENT)
Dept: BEHAVIORAL HEALTH | Facility: CLINIC | Age: 63
End: 2023-08-24

## 2023-08-24 NOTE — TELEPHONE ENCOUNTER
Pt is a(n) adult (18+ out of HS) Seeking as eval for Adult ROBERT Assessment for evaluation and recommendations..  Appointment scheduled by:  Patient.  (self-pay - complete Cost Estimate)  Caller name:  Jerad    Caller phone #: [phone]  Legal Guardianship Reviewed?  No  Honoring Choices Notified?  No  Brief reason for appt:  Pt missed 8/24 appt and had to be rescheduled     needed?  NO    Contact information verified/updated: Yes    Lee Ann Melo

## 2023-08-24 NOTE — TELEPHONE ENCOUNTER
Unable to reach patient on phone.   Sent Geniuzz message.      Umair Hawkins CMA ([facility]) at 12:18 PM on 8/24/2023

## 2023-08-24 NOTE — TELEPHONE ENCOUNTER
[facility] Call Center    Phone Message    May a detailed message be left on voicemail: yes     Reason for Call: Order(s): Home Care Orders: Other: looking to resume home care not able to get ahold of patient.     Action Taken: Message routed to:  [facility]    Travel Screening: Not Applicable

## 2023-08-25 ENCOUNTER — DOCUMENTATION ONLY (OUTPATIENT)
Dept: INTERNAL MEDICINE | Facility: CLINIC | Age: 63
End: 2023-08-25

## 2023-08-25 NOTE — PROGRESS NOTES
Type of Form Received: Service on Hold    Form Received (Date) 08/22/23   Form Filled out Yes, faxed 8/29/23 AV   Placed in provider folder Yes

## 2023-08-28 ENCOUNTER — VIRTUAL VISIT (OUTPATIENT)
Dept: PSYCHIATRY | Facility: CLINIC | Age: 63
End: 2023-08-28
Attending: NURSE PRACTITIONER
Payer: COMMERCIAL

## 2023-08-28 ENCOUNTER — MEDICAL CORRESPONDENCE (OUTPATIENT)
Dept: HEALTH INFORMATION MANAGEMENT | Facility: CLINIC | Age: 63
End: 2023-08-28

## 2023-08-28 ENCOUNTER — TELEPHONE (OUTPATIENT)
Dept: INTERNAL MEDICINE | Facility: CLINIC | Age: 63
End: 2023-08-28

## 2023-08-28 DIAGNOSIS — F33.41 RECURRENT MAJOR DEPRESSIVE DISORDER, IN PARTIAL REMISSION (H): Primary | ICD-10-CM

## 2023-08-28 PROCEDURE — 99443 PR PHYSICIAN TELEPHONE EVALUATION 21-30 MIN: CPT | Mod: 95 | Performed by: NURSE PRACTITIONER

## 2023-08-28 ASSESSMENT — ANXIETY QUESTIONNAIRES
5. BEING SO RESTLESS THAT IT IS HARD TO SIT STILL: MORE THAN HALF THE DAYS
3. WORRYING TOO MUCH ABOUT DIFFERENT THINGS: SEVERAL DAYS
6. BECOMING EASILY ANNOYED OR IRRITABLE: SEVERAL DAYS
IF YOU CHECKED OFF ANY PROBLEMS ON THIS QUESTIONNAIRE, HOW DIFFICULT HAVE THESE PROBLEMS MADE IT FOR YOU TO DO YOUR WORK, TAKE CARE OF THINGS AT HOME, OR GET ALONG WITH OTHER PEOPLE: SOMEWHAT DIFFICULT
GAD7 TOTAL SCORE: 12
4. TROUBLE RELAXING: MORE THAN HALF THE DAYS
2. NOT BEING ABLE TO STOP OR CONTROL WORRYING: MORE THAN HALF THE DAYS
1. FEELING NERVOUS, ANXIOUS, OR ON EDGE: NEARLY EVERY DAY
GAD7 TOTAL SCORE: 12
7. FEELING AFRAID AS IF SOMETHING AWFUL MIGHT HAPPEN: SEVERAL DAYS

## 2023-08-28 ASSESSMENT — PATIENT HEALTH QUESTIONNAIRE - PHQ9
10. IF YOU CHECKED OFF ANY PROBLEMS, HOW DIFFICULT HAVE THESE PROBLEMS MADE IT FOR YOU TO DO YOUR WORK, TAKE CARE OF THINGS AT HOME, OR GET ALONG WITH OTHER PEOPLE: SOMEWHAT DIFFICULT
SUM OF ALL RESPONSES TO PHQ QUESTIONS 1-9: 14
SUM OF ALL RESPONSES TO PHQ QUESTIONS 1-9: 14

## 2023-08-28 ASSESSMENT — PAIN SCALES - GENERAL: PAINLEVEL: NO PAIN (0)

## 2023-08-28 NOTE — NURSING NOTE
Is the patient currently in the state of [place]? YES    Visit mode:TELEPHONE    If the visit is dropped, the patient can be reconnected by: TELEPHONE VISIT: Phone number: [phone]    Will anyone else be joining the visit? NO  (If patient encounters technical issues they should call [phone]    How would you like to obtain your AVS? MyChart    Are changes needed to the allergy or medication list? No    Reason for visit: RECHECK    Olimpia LUNA
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
## 2023-08-28 NOTE — PROGRESS NOTES
This has been sent twice and they are needing a signature, please send back ASAP to fax: [phone].

## 2023-08-28 NOTE — PROGRESS NOTES
This has been sent twice and they are needing a signature, please send back ASAP to fax: [phone].

## 2023-08-28 NOTE — PROGRESS NOTES
This has been sent twice and they are needing a signature, please send back ASAP to fax: [phone].

## 2023-08-28 NOTE — PROGRESS NOTES
This has been sent twice and they are needing a signature, please send back ASAP to fax: [phone].

## 2023-08-28 NOTE — TELEPHONE ENCOUNTER
[facility] Call Center    Phone Message    May a detailed message be left on voicemail: yes     Reason for Call: Order# [id number], This has been sent twice and they are needing a signature, please send back ASAP to fax: [phone].    Action Taken: Message routed to:  [facility]    Travel Screening: Not Applicable

## 2023-08-28 NOTE — PATIENT INSTRUCTIONS
**For crisis resources, please see the information at the end of this document**   Patient Education    Thank you for coming to the [facility] [place] [facility].     Lab Testing:  If you had lab testing today and your results are reassuring or normal they will be mailed to you or sent through [employer] within 7 days. If the lab tests need quick action we will call you with the results. The phone number we will call with results is # [phone]. If this is not the best number please call our clinic and change the number.     Medication Refills:  If you need any refills please call your pharmacy and they will contact us. Our fax number for refills is [phone].   Three business days of notice are needed for general medication refill requests.   Five business days of notice are needed for controlled substance refill requests.   If you need to change to a different pharmacy, please contact the new pharmacy directly. The new pharmacy will help you get your medications transferred.     Contact Us:  Please call [phone] during business hours (8-5:00 M-F).   If you have medication related questions after clinic hours, or on the weekend, please call [phone].     Financial Assistance [phone]   Medical Records [phone]       MENTAL HEALTH CRISIS RESOURCES:  For a emergency help, please call 911 or go to the nearest Emergency Department.     Emergency Walk-In Options:   EmPATH Unit @ [facility] ([place]): [phone] - Specialized mental health emergency area designed to be calming  LTAC, located within [facility] ([place]): [phone]  [facility] ([place]): [phone]  [facility]): [phone]    [facility] Crisis Information:   [place]: [phone]  [place]: [phone]  [place] ([place]) - Adult: [phone]     Child: [phone]  [place] - Adult: [phone]     Child: [phone]  [place]:  [phone]  List of all [facility] resources:   https://[place].gov/dhs/people-we-serve/adults/health-care/mental-health/resources/crisis-contacts.jsp    National Crisis Information:   Crisis Text Line: Text  [place]  to [address]  Suicide & Crisis Lifeline: 988  National Suicide Prevention Lifeline: 7-188-925-TALK (1-227.561.6318)       For online chat options, visit https://suicidepreventionlifeline.org/chat/  Poison Control Center: 1-572-825-7634  Trans Lifeline: 1-108-082-8894 - Hotline for transgender people of all ages  The [employer]: 2-232-508-8581 - Hotline for LGBT youth     For Non-Emergency Support:   Fast Tracker: Mental Health & Substance Use Disorder Resources -   https://www.[employer].org/

## 2023-08-29 ENCOUNTER — DOCUMENTATION ONLY (OUTPATIENT)
Dept: INTERNAL MEDICINE | Facility: CLINIC | Age: 63
End: 2023-08-29

## 2023-08-29 NOTE — TELEPHONE ENCOUNTER
8/29/2023    Phone did not ring, left VM with date and time of appt, asking for a return call.    Mitra Salinas [facility] - Patient Navigator   [email or web address]  Direct phone: [phone]

## 2023-08-29 NOTE — PROGRESS NOTES
Type of Form Received:     Form Received (Date) 08/24/23   Form Filled out Yes 9/6/23   Placed in provider folder Yes

## 2023-08-29 NOTE — CONFIDENTIAL NOTE
Called and spoke with Ruby and let her know I faxed [phone], [phone], [phone]. I did not see order [id number]. She said it will be refaxed.

## 2023-08-30 ENCOUNTER — APPOINTMENT (OUTPATIENT)
Dept: RADIOLOGY | Facility: HOSPITAL | Age: 63
DRG: 640 | End: 2023-08-30
Attending: HOSPITALIST
Payer: COMMERCIAL

## 2023-08-30 ENCOUNTER — APPOINTMENT (OUTPATIENT)
Dept: OCCUPATIONAL THERAPY | Facility: HOSPITAL | Age: 63
DRG: 640 | End: 2023-08-30
Attending: HOSPITALIST
Payer: COMMERCIAL

## 2023-08-30 ENCOUNTER — HOSPITAL ENCOUNTER (INPATIENT)
Facility: HOSPITAL | Age: 63
LOS: 2 days | Discharge: NURSING, REHAB OR LONG-TERM CARE | DRG: 640 | End: 2023-09-01
Attending: EMERGENCY MEDICINE | Admitting: HOSPITALIST
Payer: COMMERCIAL

## 2023-08-30 DIAGNOSIS — Z91.148 NONADHERENCE TO MEDICATION: ICD-10-CM

## 2023-08-30 DIAGNOSIS — R53.1 GENERAL WEAKNESS: ICD-10-CM

## 2023-08-30 DIAGNOSIS — E87.6 HYPOKALEMIA: ICD-10-CM

## 2023-08-30 DIAGNOSIS — E83.42 HYPOMAGNESEMIA: ICD-10-CM

## 2023-08-30 DIAGNOSIS — R06.2 WHEEZING: ICD-10-CM

## 2023-08-30 DIAGNOSIS — R26.2 UNABLE TO AMBULATE: ICD-10-CM

## 2023-08-30 DIAGNOSIS — R06.09 DYSPNEA ON EXERTION: ICD-10-CM

## 2023-08-30 LAB
ALBUMIN SERPL BCG-MCNC: 3.4 G/DL (ref 3.5–5.2)
ALBUMIN UR-MCNC: 10 MG/DL
ALP SERPL-CCNC: 70 U/L (ref 40–129)
ALT SERPL W P-5'-P-CCNC: 9 U/L (ref 0–70)
ANION GAP SERPL CALCULATED.3IONS-SCNC: 18 MMOL/L (ref 7–15)
APPEARANCE UR: CLEAR
AST SERPL W P-5'-P-CCNC: 23 U/L (ref 0–45)
BILIRUB SERPL-MCNC: 2 MG/DL
BILIRUB UR QL STRIP: NEGATIVE
BUN SERPL-MCNC: 13.1 MG/DL (ref 8–23)
CALCIUM SERPL-MCNC: 9.2 MG/DL (ref 8.8–10.2)
CHLORIDE SERPL-SCNC: 97 MMOL/L (ref 98–107)
COLOR UR AUTO: YELLOW
CREAT SERPL-MCNC: 0.87 MG/DL (ref 0.67–1.17)
DEPRECATED HCO3 PLAS-SCNC: 20 MMOL/L (ref 22–29)
ERYTHROCYTE [DISTWIDTH] IN BLOOD BY AUTOMATED COUNT: 15.7 % (ref 10–15)
ETHANOL SERPL-MCNC: <0.01 G/DL
FLUAV RNA SPEC QL NAA+PROBE: NEGATIVE
FLUBV RNA RESP QL NAA+PROBE: NEGATIVE
GFR SERPL CREATININE-BSD FRML MDRD: >90 ML/MIN/1.73M2
GLUCOSE SERPL-MCNC: 100 MG/DL (ref 70–99)
GLUCOSE UR STRIP-MCNC: NEGATIVE MG/DL
HCT VFR BLD AUTO: 40.1 % (ref 40–53)
HGB BLD-MCNC: 12.5 G/DL (ref 13.3–17.7)
HGB UR QL STRIP: NEGATIVE
HOLD SPECIMEN: NORMAL
HOLD SPECIMEN: NORMAL
HYALINE CASTS: 12 /LPF
KETONES UR STRIP-MCNC: 10 MG/DL
LEUKOCYTE ESTERASE UR QL STRIP: NEGATIVE
MAGNESIUM SERPL-MCNC: 1.2 MG/DL (ref 1.7–2.3)
MAGNESIUM SERPL-MCNC: 2.2 MG/DL (ref 1.7–2.3)
MCH RBC QN AUTO: 26.7 PG (ref 26.5–33)
MCHC RBC AUTO-ENTMCNC: 31.2 G/DL (ref 31.5–36.5)
MCV RBC AUTO: 86 FL (ref 78–100)
MUCOUS THREADS #/AREA URNS LPF: PRESENT /LPF
NITRATE UR QL: NEGATIVE
PH UR STRIP: 5 [PH] (ref 5–7)
PLATELET # BLD AUTO: 417 10E3/UL (ref 150–450)
POTASSIUM SERPL-SCNC: 3.1 MMOL/L (ref 3.4–5.3)
POTASSIUM SERPL-SCNC: 3.7 MMOL/L (ref 3.4–5.3)
PROCALCITONIN SERPL IA-MCNC: 1.29 NG/ML
PROT SERPL-MCNC: 6.4 G/DL (ref 6.4–8.3)
RBC # BLD AUTO: 4.69 10E6/UL (ref 4.4–5.9)
RBC URINE: <1 /HPF
RSV RNA SPEC NAA+PROBE: NEGATIVE
SARS-COV-2 RNA RESP QL NAA+PROBE: NEGATIVE
SODIUM SERPL-SCNC: 135 MMOL/L (ref 136–145)
SP GR UR STRIP: 1.02 (ref 1–1.03)
SQUAMOUS EPITHELIAL: 1 /HPF
TROPONIN T SERPL HS-MCNC: 37 NG/L
TROPONIN T SERPL HS-MCNC: 42 NG/L
UROBILINOGEN UR STRIP-MCNC: <2 MG/DL
WBC # BLD AUTO: 11.1 10E3/UL (ref 4–11)
WBC URINE: 3 /HPF

## 2023-08-30 PROCEDURE — 84145 PROCALCITONIN (PCT): CPT | Performed by: HOSPITALIST

## 2023-08-30 PROCEDURE — 96375 TX/PRO/DX INJ NEW DRUG ADDON: CPT

## 2023-08-30 PROCEDURE — 250N000013 HC RX MED GY IP 250 OP 250 PS 637: Performed by: STUDENT IN AN ORGANIZED HEALTH CARE EDUCATION/TRAINING PROGRAM

## 2023-08-30 PROCEDURE — 97166 OT EVAL MOD COMPLEX 45 MIN: CPT | Mod: GO

## 2023-08-30 PROCEDURE — 258N000003 HC RX IP 258 OP 636: Performed by: HOSPITALIST

## 2023-08-30 PROCEDURE — 84132 ASSAY OF SERUM POTASSIUM: CPT | Performed by: HOSPITALIST

## 2023-08-30 PROCEDURE — 93005 ELECTROCARDIOGRAM TRACING: CPT | Performed by: EMERGENCY MEDICINE

## 2023-08-30 PROCEDURE — 81001 URINALYSIS AUTO W/SCOPE: CPT | Performed by: HOSPITALIST

## 2023-08-30 PROCEDURE — 83735 ASSAY OF MAGNESIUM: CPT | Performed by: STUDENT IN AN ORGANIZED HEALTH CARE EDUCATION/TRAINING PROGRAM

## 2023-08-30 PROCEDURE — 99285 EMERGENCY DEPT VISIT HI MDM: CPT | Mod: 25

## 2023-08-30 PROCEDURE — 96366 THER/PROPH/DIAG IV INF ADDON: CPT

## 2023-08-30 PROCEDURE — 85027 COMPLETE CBC AUTOMATED: CPT | Performed by: STUDENT IN AN ORGANIZED HEALTH CARE EDUCATION/TRAINING PROGRAM

## 2023-08-30 PROCEDURE — 80180 DRUG SCRN QUAN MYCOPHENOLATE: CPT | Performed by: HOSPITALIST

## 2023-08-30 PROCEDURE — 120N000001 HC R&B MED SURG/OB

## 2023-08-30 PROCEDURE — 250N000011 HC RX IP 250 OP 636: Performed by: STUDENT IN AN ORGANIZED HEALTH CARE EDUCATION/TRAINING PROGRAM

## 2023-08-30 PROCEDURE — 87637 SARSCOV2&INF A&B&RSV AMP PRB: CPT | Performed by: STUDENT IN AN ORGANIZED HEALTH CARE EDUCATION/TRAINING PROGRAM

## 2023-08-30 PROCEDURE — 97535 SELF CARE MNGMENT TRAINING: CPT | Mod: GO

## 2023-08-30 PROCEDURE — 99222 1ST HOSP IP/OBS MODERATE 55: CPT | Performed by: HOSPITALIST

## 2023-08-30 PROCEDURE — 80053 COMPREHEN METABOLIC PANEL: CPT | Performed by: STUDENT IN AN ORGANIZED HEALTH CARE EDUCATION/TRAINING PROGRAM

## 2023-08-30 PROCEDURE — 82077 ASSAY SPEC XCP UR&BREATH IA: CPT | Performed by: STUDENT IN AN ORGANIZED HEALTH CARE EDUCATION/TRAINING PROGRAM

## 2023-08-30 PROCEDURE — 84484 ASSAY OF TROPONIN QUANT: CPT | Performed by: STUDENT IN AN ORGANIZED HEALTH CARE EDUCATION/TRAINING PROGRAM

## 2023-08-30 PROCEDURE — 250N000013 HC RX MED GY IP 250 OP 250 PS 637: Performed by: HOSPITALIST

## 2023-08-30 PROCEDURE — 250N000013 HC RX MED GY IP 250 OP 250 PS 637: Performed by: EMERGENCY MEDICINE

## 2023-08-30 PROCEDURE — 250N000011 HC RX IP 250 OP 636: Mod: JZ | Performed by: HOSPITALIST

## 2023-08-30 PROCEDURE — 36415 COLL VENOUS BLD VENIPUNCTURE: CPT | Performed by: HOSPITALIST

## 2023-08-30 PROCEDURE — 84484 ASSAY OF TROPONIN QUANT: CPT | Performed by: EMERGENCY MEDICINE

## 2023-08-30 PROCEDURE — 87040 BLOOD CULTURE FOR BACTERIA: CPT | Performed by: HOSPITALIST

## 2023-08-30 PROCEDURE — 83735 ASSAY OF MAGNESIUM: CPT | Performed by: HOSPITALIST

## 2023-08-30 PROCEDURE — 36415 COLL VENOUS BLD VENIPUNCTURE: CPT | Performed by: STUDENT IN AN ORGANIZED HEALTH CARE EDUCATION/TRAINING PROGRAM

## 2023-08-30 PROCEDURE — 36415 COLL VENOUS BLD VENIPUNCTURE: CPT | Performed by: EMERGENCY MEDICINE

## 2023-08-30 PROCEDURE — 71046 X-RAY EXAM CHEST 2 VIEWS: CPT

## 2023-08-30 PROCEDURE — 258N000003 HC RX IP 258 OP 636: Performed by: STUDENT IN AN ORGANIZED HEALTH CARE EDUCATION/TRAINING PROGRAM

## 2023-08-30 PROCEDURE — 250N000012 HC RX MED GY IP 250 OP 636 PS 637: Performed by: HOSPITALIST

## 2023-08-30 PROCEDURE — 96365 THER/PROPH/DIAG IV INF INIT: CPT

## 2023-08-30 RX ORDER — NITROGLYCERIN 0.4 MG/1
0.4 TABLET SUBLINGUAL EVERY 5 MIN PRN
Status: DISCONTINUED | OUTPATIENT
Start: 2023-08-30 | End: 2023-09-01 | Stop reason: HOSPADM

## 2023-08-30 RX ORDER — AMOXICILLIN 250 MG
2 CAPSULE ORAL 2 TIMES DAILY PRN
Status: DISCONTINUED | OUTPATIENT
Start: 2023-08-30 | End: 2023-09-01 | Stop reason: HOSPADM

## 2023-08-30 RX ORDER — LORAZEPAM 2 MG/ML
2 INJECTION INTRAMUSCULAR ONCE
Status: COMPLETED | OUTPATIENT
Start: 2023-08-30 | End: 2023-08-30

## 2023-08-30 RX ORDER — NALTREXONE HYDROCHLORIDE 50 MG/1
50 TABLET, FILM COATED ORAL DAILY
Status: DISCONTINUED | OUTPATIENT
Start: 2023-08-30 | End: 2023-09-01 | Stop reason: HOSPADM

## 2023-08-30 RX ORDER — PROCHLORPERAZINE 25 MG
25 SUPPOSITORY, RECTAL RECTAL EVERY 12 HOURS PRN
Status: DISCONTINUED | OUTPATIENT
Start: 2023-08-30 | End: 2023-09-01 | Stop reason: HOSPADM

## 2023-08-30 RX ORDER — PIPERACILLIN SODIUM, TAZOBACTAM SODIUM 3; .375 G/15ML; G/15ML
3.38 INJECTION, POWDER, LYOPHILIZED, FOR SOLUTION INTRAVENOUS EVERY 8 HOURS
Status: DISCONTINUED | OUTPATIENT
Start: 2023-08-30 | End: 2023-08-30

## 2023-08-30 RX ORDER — METOCLOPRAMIDE HYDROCHLORIDE 5 MG/ML
10 INJECTION INTRAMUSCULAR; INTRAVENOUS ONCE
Status: COMPLETED | OUTPATIENT
Start: 2023-08-30 | End: 2023-08-30

## 2023-08-30 RX ORDER — ACETAMINOPHEN 500 MG
1000 TABLET ORAL 3 TIMES DAILY PRN
Status: DISCONTINUED | OUTPATIENT
Start: 2023-08-30 | End: 2023-09-01 | Stop reason: HOSPADM

## 2023-08-30 RX ORDER — FLUTICASONE FUROATE AND VILANTEROL 100; 25 UG/1; UG/1
1 POWDER RESPIRATORY (INHALATION) DAILY
Status: DISCONTINUED | OUTPATIENT
Start: 2023-08-30 | End: 2023-09-01 | Stop reason: HOSPADM

## 2023-08-30 RX ORDER — ALBUTEROL SULFATE 90 UG/1
2 AEROSOL, METERED RESPIRATORY (INHALATION) EVERY 6 HOURS PRN
Status: DISCONTINUED | OUTPATIENT
Start: 2023-08-30 | End: 2023-09-01 | Stop reason: HOSPADM

## 2023-08-30 RX ORDER — ASPIRIN 81 MG/1
81 TABLET ORAL DAILY
Status: DISCONTINUED | OUTPATIENT
Start: 2023-08-30 | End: 2023-09-01 | Stop reason: HOSPADM

## 2023-08-30 RX ORDER — PANTOPRAZOLE SODIUM 40 MG/1
40 TABLET, DELAYED RELEASE ORAL DAILY
Status: DISCONTINUED | OUTPATIENT
Start: 2023-08-30 | End: 2023-09-01 | Stop reason: HOSPADM

## 2023-08-30 RX ORDER — ENOXAPARIN SODIUM 100 MG/ML
40 INJECTION SUBCUTANEOUS EVERY 24 HOURS
Status: DISCONTINUED | OUTPATIENT
Start: 2023-08-30 | End: 2023-08-30

## 2023-08-30 RX ORDER — MEROPENEM 1 G/1
1 INJECTION, POWDER, FOR SOLUTION INTRAVENOUS EVERY 8 HOURS
Status: DISCONTINUED | OUTPATIENT
Start: 2023-08-30 | End: 2023-09-01 | Stop reason: HOSPADM

## 2023-08-30 RX ORDER — DIPHENHYDRAMINE HYDROCHLORIDE 50 MG/ML
12.5 INJECTION INTRAMUSCULAR; INTRAVENOUS ONCE
Status: COMPLETED | OUTPATIENT
Start: 2023-08-30 | End: 2023-08-30

## 2023-08-30 RX ORDER — MAGNESIUM SULFATE HEPTAHYDRATE 40 MG/ML
2 INJECTION, SOLUTION INTRAVENOUS ONCE
Status: COMPLETED | OUTPATIENT
Start: 2023-08-30 | End: 2023-08-30

## 2023-08-30 RX ORDER — ISOSORBIDE MONONITRATE 30 MG/1
60 TABLET, EXTENDED RELEASE ORAL DAILY
Status: DISCONTINUED | OUTPATIENT
Start: 2023-08-30 | End: 2023-09-01 | Stop reason: HOSPADM

## 2023-08-30 RX ORDER — ENTECAVIR 0.5 MG/1
0.5 TABLET, FILM COATED ORAL DAILY
Status: DISCONTINUED | OUTPATIENT
Start: 2023-08-30 | End: 2023-09-01 | Stop reason: HOSPADM

## 2023-08-30 RX ORDER — AMOXICILLIN 250 MG
1 CAPSULE ORAL 2 TIMES DAILY PRN
Status: DISCONTINUED | OUTPATIENT
Start: 2023-08-30 | End: 2023-09-01 | Stop reason: HOSPADM

## 2023-08-30 RX ORDER — SODIUM CHLORIDE 9 MG/ML
INJECTION, SOLUTION INTRAVENOUS CONTINUOUS
Status: DISCONTINUED | OUTPATIENT
Start: 2023-08-30 | End: 2023-09-01 | Stop reason: HOSPADM

## 2023-08-30 RX ORDER — PROCHLORPERAZINE MALEATE 10 MG
10 TABLET ORAL EVERY 6 HOURS PRN
Status: DISCONTINUED | OUTPATIENT
Start: 2023-08-30 | End: 2023-09-01 | Stop reason: HOSPADM

## 2023-08-30 RX ORDER — PREDNISONE 5 MG/1
5 TABLET ORAL DAILY
Status: DISCONTINUED | OUTPATIENT
Start: 2023-08-30 | End: 2023-09-01 | Stop reason: HOSPADM

## 2023-08-30 RX ORDER — LIDOCAINE 40 MG/G
CREAM TOPICAL
Status: DISCONTINUED | OUTPATIENT
Start: 2023-08-30 | End: 2023-09-01 | Stop reason: HOSPADM

## 2023-08-30 RX ORDER — ROSUVASTATIN CALCIUM 10 MG/1
20 TABLET, COATED ORAL DAILY
Status: DISCONTINUED | OUTPATIENT
Start: 2023-08-30 | End: 2023-09-01 | Stop reason: HOSPADM

## 2023-08-30 RX ORDER — POTASSIUM CHLORIDE 1500 MG/1
40 TABLET, EXTENDED RELEASE ORAL ONCE
Status: COMPLETED | OUTPATIENT
Start: 2023-08-30 | End: 2023-08-30

## 2023-08-30 RX ORDER — LATANOPROST 50 UG/ML
1 SOLUTION/ DROPS OPHTHALMIC AT BEDTIME
Status: DISCONTINUED | OUTPATIENT
Start: 2023-08-30 | End: 2023-09-01 | Stop reason: HOSPADM

## 2023-08-30 RX ORDER — MYCOPHENOLATE MOFETIL 250 MG/1
750 CAPSULE ORAL 2 TIMES DAILY
Status: DISCONTINUED | OUTPATIENT
Start: 2023-08-30 | End: 2023-09-01 | Stop reason: HOSPADM

## 2023-08-30 RX ORDER — FUROSEMIDE 20 MG
20 TABLET ORAL DAILY PRN
Status: DISCONTINUED | OUTPATIENT
Start: 2023-08-30 | End: 2023-09-01 | Stop reason: HOSPADM

## 2023-08-30 RX ORDER — MULTIPLE VITAMINS W/ MINERALS TAB 9MG-400MCG
1 TAB ORAL DAILY
Status: DISCONTINUED | OUTPATIENT
Start: 2023-08-30 | End: 2023-09-01 | Stop reason: HOSPADM

## 2023-08-30 RX ADMIN — FLUOXETINE 40 MG: 20 CAPSULE ORAL at 14:01

## 2023-08-30 RX ADMIN — FLUTICASONE FUROATE AND VILANTEROL TRIFENATATE 1 PUFF: 100; 25 POWDER RESPIRATORY (INHALATION) at 14:05

## 2023-08-30 RX ADMIN — MEROPENEM 1 G: 1 INJECTION, POWDER, FOR SOLUTION INTRAVENOUS at 22:42

## 2023-08-30 RX ADMIN — PANTOPRAZOLE SODIUM 40 MG: 40 TABLET, DELAYED RELEASE ORAL at 14:03

## 2023-08-30 RX ADMIN — METOPROLOL SUCCINATE 12.5 MG: 25 TABLET, EXTENDED RELEASE ORAL at 14:03

## 2023-08-30 RX ADMIN — MAGNESIUM SULFATE HEPTAHYDRATE 2 G: 40 INJECTION, SOLUTION INTRAVENOUS at 09:11

## 2023-08-30 RX ADMIN — DIPHENHYDRAMINE HYDROCHLORIDE 12.5 MG: 50 INJECTION, SOLUTION INTRAMUSCULAR; INTRAVENOUS at 09:09

## 2023-08-30 RX ADMIN — NALTREXONE HYDROCHLORIDE 50 MG: 50 TABLET, FILM COATED ORAL at 14:02

## 2023-08-30 RX ADMIN — SODIUM CHLORIDE 500 ML: 9 INJECTION, SOLUTION INTRAVENOUS at 09:04

## 2023-08-30 RX ADMIN — FLUOXETINE 20 MG: 20 CAPSULE ORAL at 14:05

## 2023-08-30 RX ADMIN — LORAZEPAM 2 MG: 2 INJECTION INTRAMUSCULAR; INTRAVENOUS at 10:47

## 2023-08-30 RX ADMIN — METOCLOPRAMIDE 10 MG: 5 INJECTION, SOLUTION INTRAMUSCULAR; INTRAVENOUS at 09:07

## 2023-08-30 RX ADMIN — PREDNISONE 5 MG: 5 TABLET ORAL at 14:20

## 2023-08-30 RX ADMIN — MEROPENEM 1 G: 1 INJECTION, POWDER, FOR SOLUTION INTRAVENOUS at 15:44

## 2023-08-30 RX ADMIN — RIVAROXABAN 15 MG: 15 TABLET, FILM COATED ORAL at 09:17

## 2023-08-30 RX ADMIN — Medication 81 MG: at 14:02

## 2023-08-30 RX ADMIN — ISOSORBIDE MONONITRATE 60 MG: 30 TABLET, EXTENDED RELEASE ORAL at 14:02

## 2023-08-30 RX ADMIN — POTASSIUM CHLORIDE 40 MEQ: 1500 TABLET, EXTENDED RELEASE ORAL at 10:40

## 2023-08-30 RX ADMIN — MYCOPHENOLATE MOFETIL 750 MG: 250 CAPSULE ORAL at 14:04

## 2023-08-30 RX ADMIN — ACETAMINOPHEN 1000 MG: 500 TABLET ORAL at 14:20

## 2023-08-30 RX ADMIN — MYCOPHENOLATE MOFETIL 750 MG: 250 CAPSULE ORAL at 22:39

## 2023-08-30 RX ADMIN — RIVAROXABAN 15 MG: 15 TABLET, FILM COATED ORAL at 18:48

## 2023-08-30 RX ADMIN — ENTECAVIR 0.5 MG: 0.5 TABLET ORAL at 14:20

## 2023-08-30 RX ADMIN — SODIUM CHLORIDE: 9 INJECTION, SOLUTION INTRAVENOUS at 13:57

## 2023-08-30 RX ADMIN — Medication 1 TABLET: at 14:01

## 2023-08-30 RX ADMIN — ROSUVASTATIN CALCIUM 20 MG: 10 TABLET, FILM COATED ORAL at 14:00

## 2023-08-30 RX ADMIN — LATANOPROST 1 DROP: 50 SOLUTION OPHTHALMIC at 22:40

## 2023-08-30 ASSESSMENT — ACTIVITIES OF DAILY LIVING (ADL)
ADLS_ACUITY_SCORE: 37
ADLS_ACUITY_SCORE: 39
ADLS_ACUITY_SCORE: 39
ADLS_ACUITY_SCORE: 37
PREVIOUS_RESPONSIBILITIES: MEDICATION MANAGEMENT;MEAL PREP
ADLS_ACUITY_SCORE: 39

## 2023-08-30 NOTE — ED TRIAGE NOTES
"Patient brought by EMS c/o nausea \" dry heaving \"  feeling weak , cough for few days, hip pain.  Sats 90% on RA per medic, 2  L nc at bedside.  Patient was here 8/22/23.     Triage Assessment       Row Name 08/30/23 0808       Triage Assessment (Adult)    Airway WDL WDL       Respiratory WDL    Respiratory WDL cough;all    Rhythm/Pattern, Respiratory shortness of breath       Skin Circulation/Temperature WDL    Skin Circulation/Temperature WDL WDL       Cardiac WDL    Cardiac WDL WDL       Peripheral/Neurovascular WDL    Peripheral Neurovascular WDL WDL       Cognitive/Neuro/Behavioral WDL    Cognitive/Neuro/Behavioral WDL WDL                    "

## 2023-08-30 NOTE — ED NOTES
RESPIRATORY CARE NOTE     Patient Name: Jerad Ross  Today's Date: 8/30/2023       Approached to pt for enquiry about pts use of Cpap Machine at home. Pt did stat that she has never used any Cpap Machine at home and not interested to use one here. Hence, Cpap order has been discontinued.     Citlaly Dasilva RRT

## 2023-08-30 NOTE — ED NOTES
Ambulated patient with walker per provider request. Upon standing, patient reported feeling weak and lightheaded, but he stated that he wanted to try to take a couple steps to see if he could. Patient took two steps and then reported being too weak to continue. Patient assisted back to the ED cart.

## 2023-08-30 NOTE — PHARMACY-ADMISSION MEDICATION HISTORY
Pharmacist Admission Medication History    Admission medication history is complete. The information provided in this note is only as accurate as the sources available at the time of the update.    Medication reconciliation/reorder completed by provider prior to medication history? No    Information Source(s): Patient and CareEverywhere/SureScripts via in-person    Pertinent Information: Patient not a reliable historian at this time. Could only tell me that he has not taken any medications in a few days, then would fall asleep. Reports only having Hydroxyzine today. List compared with previous Med Rec interview from 8/18. Unable to confirm last Xarelto dose. Pt states it was sometime this week.    Changes made to PTA medication list:  Added: None  Deleted: Calcium  Changed: None    Medication Affordability:  Not including over the counter (OTC) medications, was there a time in the past 3 months when you did not take your medications as prescribed because of cost?: Unable to Assess    Allergies reviewed with patient and updates made in EHR: unable to assess    Medication History Completed By: Pham Wilson formerly [facility] 8/30/2023 11:35 AM    Prior to Admission medications    Medication Sig Last Dose Taking? Auth Provider Long Term End Date   acetaminophen (TYLENOL) 500 MG tablet Take 1,000 mg by mouth 3 times daily as needed for mild pain Unknown at PRN Yes Unknown, Entered By History No    albuterol (PROAIR HFA) 108 (90 Base) MCG/ACT inhaler Inhale 2 puffs into the lungs every 6 hours as needed for shortness of breath / dyspnea or wheezing Unknown at PRN Yes Aston Perry MD Yes    aspirin 81 MG EC tablet Take 1 tablet (81 mg) by mouth daily Past Week at AM Yes Juju Chung MD     budesonide (PULMICORT FLEXHALER) 90 MCG/ACT inhaler Inhale 2 puffs into the lungs 2 times daily as needed (shortness of breath) Unknown at PRN Yes Unknown, Entered By History No    entecavir (BARACLUDE) 0.5 MG tablet Take 1 tablet  (0.5 mg) by mouth daily Past Week at AM Yes Lina Claros MD Yes    FLUoxetine (PROZAC) 20 MG capsule Take 1 capsule (20 mg) by mouth daily With 40mg for a total daily dose of 60mg Past Week at AM Yes Genia Fraser APRN CNP Yes    FLUoxetine (PROZAC) 40 MG capsule Take 1 capsule (40 mg) by mouth daily Past Week at AM Yes Genia Fraser APRN CNP Yes    fluticasone-salmeterol (ADVAIR) 250-50 MCG/ACT inhaler Inhale 1 puff into the lungs 2 times daily as needed (PRN) Unknown at PRN Yes Nolan Santoyo APRN CNP Yes    furosemide (LASIX) 20 MG tablet Take 1 tablet (20 mg) by mouth daily as needed (fluid retention) Unknown at PRN Yes Aston Perry MD Yes    hydrOXYzine (ATARAX) 10 MG tablet Take 1 tablet (10 mg) by mouth every 8 hours as needed for anxiety Past Week at PRN Yes Genia Fraser APRN CNP     isosorbide mononitrate (IMDUR) 60 MG 24 hr tablet Take 60 mg by mouth daily Past Week at AM Yes Unknown, Entered By History Yes    metoprolol succinate ER (TOPROL XL) 25 MG 24 hr tablet Take 0.5 tablets (12.5 mg) by mouth daily Past Week at AM Yes Bradly Puckett MD Yes    Multiple Vitamins-Iron (ONE DAILY MULTIVITAMIN/IRON) TABS Take 1 tablet by mouth daily Past Week at AM Yes Unknown, Entered By History     mycophenolate (GENERIC EQUIVALENT) 250 MG capsule Take 3 capsules (750 mg) by mouth 2 times daily Past Week at AM Yes Nolan Lovett MD Yes    naltrexone (DEPADE/REVIA) 50 MG tablet Take 1 tablet (50 mg) by mouth daily Take 1/2 tablet (25 mg) daily for 1 week, then take 1 tablet (50 mg ) daily. Past Week at AM Yes Sarahy Mullins CNP Yes    nitroGLYcerin (NITROSTAT) 0.4 MG sublingual tablet Place 1 tablet (0.4 mg) under the tongue every 5 minutes as needed for chest pain Unknown at PRN Yes Sascha Lundberg MD Yes    Omega-3 Fatty Acids (OMEGA 3 PO) Take 1 capsule by mouth daily Dose unknown Past Week at AM Yes Reported, Patient     pantoprazole  (PROTONIX) 40 MG EC tablet Take 1 tablet (40 mg) by mouth daily Past Week at AM Yes Aston Perry MD     polyethylene glycol (MIRALAX) 17 g packet Take 17 g by mouth daily as needed  Unknown at PRN Yes Reported, Patient No    predniSONE (DELTASONE) 5 MG tablet Take 1 tablet (5 mg) by mouth daily Past Week at AM Yes Nolan Lovett MD Yes    Rivaroxaban ANTICOAGULANT 15 & 20 MG TBPK Starter Therapy Pack Take 15 mg by mouth 2 times daily (with meals) for 21 days, THEN 20 mg daily with food for 9 days. Past Week at AM Yes Juju Chung MD Yes 9/21/23   rosuvastatin (CRESTOR) 20 MG tablet TAKE 1 TABLET(20 MG) BY MOUTH DAILY Past Week at AM Yes Sascha Lundberg MD Yes    tacrolimus (PROTOPIC) 0.1 % external ointment Apply topically 2 times daily as needed Unknown at PRN Yes Unknown, Entered By History     latanoprost (XALATAN) 0.005 % ophthalmic solution Place 1 drop into both eyes At Bedtime   Wood Miller MD Yes

## 2023-08-30 NOTE — ED NOTES
ILizbet have reviewed the documentation, personally taken the patient's history, performed an exam and agree with the physical finds, diagnosis and management plan.    HPI:  Per chart review, patient was admitted to [facility] [place] from 8/20/23-8/22/23. Found to be hypoxic with pulmonary embolus.  Patient was treated with Lovenox and oxygen and weaned off oxygen quickly.  Although has history of alcohol abuse no evidence for withdrawal.  Was weaned onto DOAC therapy and will hold his home aspirin while on it.  PT OT recommended home therapies for him.  Although was very hypoxic in the emergency room department weaned off oxygen quickly and was able to maintain saturations even with ambulation. Due to questionable pulmonary nodule on CT needs CT for follow-up of this was explained to patient. He will hold his aspirin initially while on anticoagulation but could resume this when done with DOAC therapy or per discretion of primary care. Discharged in good condition.     Patient reports he was discharged from [facility] [place] on 8/22/23 in good condition. Two days ago he became increasingly more short of breath and generally weak to the point that he has not been able to take his medications, including recent prescription of zorelto. Endorses associated nausea.     Per EMS, patient was sating at 90% on room air at arrival.     Denies diarrhea, fever, and chills.     Physical Exam: Significantly older than stated age. Borderline tachycardic. Lungs clear to auscultation. Abdomen soft.     MDM: Viral syndrome, influenza, COVID-19, dehydration, electrolyte abnormality, alcohol withdrawal.  Patient is not hypoxic here off of oxygen that EMS had placed him on.  He really does not describe much worsening shortness of breath cough or chest congestion.  My concern for worsening PE is overall low.  We will get him back on his Xarelto.    ED Course/workup:     ED Course as of 08/30/23 1102   Wed Aug 30, 2023   0830  Dasia Santacruz PA-C, staffed the patient with me     0836 62 y/o m hx of htn, hld, copd, cad s/p 3v cabg, fsgs s/p renal transplant and recent admit for pe.  C/o 2-3d of gen weak, slightly nauseated. Noncompliant w meds x2-3d.    0838 I met with the patient to gather history and to perform my initial exam. We discussed plans for the ED course, including diagnostic testing and treatment.      0921 Hemoglobin(!): 12.5  Chronic    0946 Troponin T, High Sensitivity(!): 37   0946 Magnesium(!): 1.2   0948 Potassium(!): 3.1   0949 Anion Gap(!): 18   0949 Carbon Dioxide (CO2)(!): 20   1024 Off O2 pt is 94-96% on RA   1101 Patient given trial of ambulation.  Able to take 1 step with walker, but weak and fatigued and unable to ambulate safely for discharge to home.       EKG:  EKG individually read and interpreted by myself:  Preformed 8:38. Sinus tachycardia. Inferior infarct. Possible aterolateral infarct. Prolonged QT. When compared to EKG from 8/20/23, QT has lengthened. Vent rate 104 bowel movement. SC interval 140 ms. QRS duration 78 ms. QTc 552. Axis -22      Final Diagnosis:     ICD-10-CM    1. General weakness  R53.1       2. Hypomagnesemia  E83.42       3. Hypokalemia  E87.6       4. Unable to ambulate  R26.2       5. Nonadherence to medication  Z91.148             I personally saw the patient and performed a substantive portion of the visit including all aspects of the medical decision making.     MD Sunday Alcantar, Lizbet GÓMEZ MD  08/30/23 1102

## 2023-08-30 NOTE — ED NOTES
Patient awake, writer assisted patient to sit up in bed, dinner tray set up and patient eating his jell-o and lemon ice.

## 2023-08-30 NOTE — ED PROVIDER NOTES
Emergency Department Encounter   NAME: Jerad Ross ; AGE: 63 year old male ; YOB: 1960 ; MRN: [id number] ; PCP: Aston Perry   ED PROVIDER: Dasia Santacruz PA-C    Evaluation Date & Time:   8/30/2023  8:32 AM    CHIEF COMPLAINT:  Shortness of Breath, Nausea, and Cough        Impression and Plan   FINAL IMPRESSION:    ICD-10-CM    1. General weakness  R53.1       2. Hypomagnesemia  E83.42       3. Hypokalemia  E87.6       4. Unable to ambulate  R26.2       5. Nonadherence to medication  Z91.148           MDM:  Jerad Ross is a 63 year old male with PMH of GERD, kidney transplant, AUD, and CAD s/p CABG x3 in 2020 arriving via EMS for worsening weakness and nausea over the past few days. He was discharged from [facility] for PE on 8/22/23 on Rivaroxaban. Following discharge he states feeling quite good, however, over the past few days he is grown weak and nauseous with some shortness of breath. He states being unable to take his medications over the past few days due to feeling weak. Per EMS his oxygen saturation was 90% upon arrival. Denies fevers, chills, chest pain, or SOB. He reports his last drink of alcohol was August 5th.     Vitals reviewed, Pulse 109 RR 20 SpO2 100% on 2 L. On exam he is is dry heaving, he appears older than stated age. CN exam WNL. Heart and lung sounds full and clear. Abdomen soft and non-distended.    Differential diagnosis includes but not limited to viral syndrome/COVID, electrolyte abnormality, dehydration, alcohol withdrawal. EKG found no ST changes with QTc of 552. He was given Reglan and Benadryl for nausea, which brought his nausea from a 8/10 to a 5/10. Ativan 2 mg was given with additional relief of nausea and anxiety. Given his prolonged QT we will hold off on further nausea medication. First high sensitivity troponin 37, repeat troponin was 42. COVID/Influenza negative. Labwork revealed a Magnesium of 1.2 and Potassium of 3.1 so these were  replaced. I removed his nasal cannula and he remained at an SpO2 of 100% on room air. Nursing staff walked the patient and his oxygen saturation did not decrease, however, he was only able to take 2 steps before feeling very weak and needing to sit down.  He is concerned that he will not be able to take care of himself if discharged home.  He does not have any family members or neighbors that can help take care of him at home. Plan to admit to medicine for observation.      Medical Decision Making    History:  Supplemental history from: Documented in chart, if applicable  External Record(s) reviewed: ED to Hosp-Admission 8/20/23  Work Up:  Chart documentation includes differential considered and any EKGs or imaging independently interpreted by provider, where specified.  In additional to work up documented, I considered the following work up: Documented in chart, if applicable.    External consultation:  Discussion of management with another provider: Dr. Brand  Complicating factors:  Care impacted by chronic illness: Heart Disease and Mental Health  Care affected by social determinants of health: N/A    Disposition considerations: Admit.      ED COURSE:  08:40 AM I met and introduced myself to the patient. I gathered initial history and performed my physical exam. We discussed plan for initial workup.   08:50 AM I have staffed the patient with Dr. Brand, ED MD, who has evaluated the patient and agrees with all aspects of today's care.   11:10 AM I spoke with hospitalist Dr. Phan, plan to admit to medicine for observation    At the conclusion of the encounter I discussed the results of all the tests and the disposition. The questions were answered. The patient or family acknowledged understanding and was agreeable with the care plan.        MEDICATIONS GIVEN IN THE EMERGENCY DEPARTMENT:  Medications   rivaroxaban ANTICOAGULANT (XARELTO) tablet 15 mg (15 mg Oral $Given 8/30/23 2653)   albuterol (PROVENTIL  HFA/VENTOLIN HFA) inhaler (has no administration in time range)   acetaminophen (TYLENOL) tablet 1,000 mg (1,000 mg Oral $Given 8/30/23 1420)   aspirin EC tablet 81 mg (81 mg Oral $Given 8/30/23 1402)   budesonide (PULMICORT FLEXHALER) AEPB 2 puff (has no administration in time range)   FLUoxetine (PROzac) capsule 20 mg (20 mg Oral $Given 8/30/23 1405)   FLUoxetine (PROzac) capsule 40 mg (40 mg Oral $Given 8/30/23 1401)   fluticasone-vilanterol (BREO ELLIPTA) 100-25 MCG/ACT inhaler 1 puff (1 puff Inhalation $Given 8/30/23 1405)   furosemide (LASIX) tablet 20 mg (has no administration in time range)   isosorbide mononitrate (IMDUR) 24 hr tablet 60 mg (60 mg Oral $Given 8/30/23 1402)   latanoprost (XALATAN) 0.005 % ophthalmic solution 1 drop (has no administration in time range)   metoprolol succinate ER (TOPROL-XL) 24 hr half-tab 12.5 mg (12.5 mg Oral $Given 8/30/23 1403)   multivitamin w/minerals (THERA-VIT-M) tablet 1 tablet (1 tablet Oral $Given 8/30/23 1401)   mycophenolate (GENERIC EQUIVALENT) capsule 750 mg (750 mg Oral $Given 8/30/23 1404)   naltrexone (DEPADE/REVIA) tablet 50 mg (50 mg Oral $Given 8/30/23 1402)   nitroGLYcerin (NITROSTAT) sublingual tablet 0.4 mg (has no administration in time range)   pantoprazole (PROTONIX) EC tablet 40 mg (40 mg Oral $Given 8/30/23 1403)   rosuvastatin (CRESTOR) tablet 20 mg (20 mg Oral $Given 8/30/23 1400)   lidocaine 1 % 0.1-1 mL (has no administration in time range)   lidocaine (LMX4) cream (has no administration in time range)   sodium chloride (PF) 0.9% PF flush 3 mL (3 mLs Intracatheter Not Given 8/30/23 1917)   sodium chloride (PF) 0.9% PF flush 3 mL (has no administration in time range)   melatonin tablet 1 mg (has no administration in time range)   Patient is already receiving anticoagulation with heparin, enoxaparin (LOVENOX), warfarin (COUMADIN)  or other anticoagulant medication (has no administration in time range)   senna-docusate (SENOKOT-S/PERICOLACE)  8.6-50 MG per tablet 1 tablet (has no administration in time range)     Or   senna-docusate (SENOKOT-S/PERICOLACE) 8.6-50 MG per tablet 2 tablet (has no administration in time range)   prochlorperazine (COMPAZINE) injection 10 mg (has no administration in time range)     Or   prochlorperazine (COMPAZINE) tablet 10 mg (has no administration in time range)     Or   prochlorperazine (COMPAZINE) suppository 25 mg (has no administration in time range)   entecavir (BARACLUDE) tablet 0.5 mg (0.5 mg Oral $Given 8/30/23 1420)   predniSONE (DELTASONE) tablet 5 mg (5 mg Oral $Given 8/30/23 1420)   sodium chloride 0.9% infusion ( Intravenous Rate/Dose Verify 8/30/23 1804)   meropenem (MERREM) 1 g vial to attach to  mL bag (0 g Intravenous Stopped 8/30/23 1804)   magnesium sulfate 2 g in 50 mL sterile water intermittent infusion (0 g Intravenous Stopped 8/30/23 1051)   metoclopramide (REGLAN) injection 10 mg (10 mg Intravenous $Given 8/30/23 0907)   diphenhydrAMINE (BENADRYL) injection 12.5 mg (12.5 mg Intravenous $Given 8/30/23 0909)   0.9% sodium chloride BOLUS (0 mLs Intravenous Stopped 8/30/23 1051)   potassium chloride ER (KLOR-CON M) CR tablet 40 mEq (40 mEq Oral $Given 8/30/23 1040)   LORazepam (ATIVAN) injection 2 mg (2 mg Intravenous $Given 8/30/23 1047)         NEW PRESCRIPTIONS STARTED AT TODAY'S ED VISIT:  Current Discharge Medication List            HPI   Patient information was obtained from: Patient, AMS  Use of Intrepreter: N/A     Jerad Ross is a 63 year old male with PMH of GERD, kidney transplant, AUD, and CAD s/p   His discharge from [facility] for PE on 8/22/23 and discharged on Rivaroxaban following which he states feeling quite good, however, over the past few days he is grown weak and nauseous with some shortness of breath. He states being unable to take his medications over the past few days due to feeling weak.  Per EMS his oxygen saturation was 90% upon arrival.    Per chart review,  patient was admitted to [facility] from 8/20/23-8/22/23. Found to be hypoxic with pulmonary embolus.  Patient was treated with Lovenox and oxygen and weaned off oxygen quickly.  Although has history of alcohol abuse no evidence for withdrawal.  Was weaned onto DOAC therapy and will hold his home aspirin while on it.  PT OT recommended home therapies for him.  Although was very hypoxic in the emergency room department weaned off oxygen quickly and was able to maintain saturations even with ambulation. Due to questionable pulmonary nodule on CT needs CT for follow-up of this was explained to patient. He will hold his aspirin initially while on anticoagulation but could resume this when done with DOAC therapy or per discretion of primary care. Discharged in good condition.         Medical History     Past Medical History:   Diagnosis Date    Acute midline low back pain without sciatica     AION (acute ischemic optic neuropathy)     Anemia in chronic renal disease     Arthritis     Avascular necrosis of bones of both hips (H)     Avascular necrosis of bones of both hips (H)     Basal cell carcinoma     Chronic hepatitis B (H)     Clostridium difficile colitis     COPD (chronic obstructive pulmonary disease) (H)     Coronary artery disease involving native coronary artery of native heart without angina pectoris 06/17/2014    CRP elevated     Depression     Depressive disorder     Diverticulosis     Dyslipidemia     Elevated C-reactive protein (CRP)     FSGS (focal segmental glomerulosclerosis)     FSGS (focal segmental glomerulosclerosis)     Gastric ulcer with hemorrhage 02/12/2012    Gastric ulcer with hemorrhage 02/12/2012    GERD (gastroesophageal reflux disease)     Glaucoma     Glaucoma     Hemorrhoids     Hemorrhoids     History of blood transfusion     HTN (hypertension)     Hyperlipidemia     Hypertension secondary to other renal disorders     Hypogonadism in male     Immunosuppressed status (H)      Kidney replaced by transplant     Kidney transplanted     NSTEMI (non-ST elevated myocardial infarction) (H) 06/17/2014    NSTEMI (non-ST elevated myocardial infarction) (H) 06/17/2014    JULIANE (obstructive sleep apnea)     Paracentral scotoma     Secondary renal hyperparathyroidism (H)     Secondary renal hyperparathyroidism (H)     Septic bursitis     Squamous cell carcinoma     Uncomplicated asthma        Past Surgical History:   Procedure Laterality Date    APPENDECTOMY      ARTHROPLASTY REVISION HIP Right 6/19/2023    Procedure: RIGHT HIP REVISION;  Surgeon: Juan Mcdonough MD;  Location:  OR    BIOPSY      Cardiac Bypass surgery  06/08/2020    [facility]     CATARACT EXTRACTION EXTRACAPSULAR W/ INTRAOCULAR LENS IMPLANTATION Bilateral 4-20-10, 6-1-10    CATARACT IOL, RT/LT  04/19/2000    RE    CATARACT IOL, RT/LT  06/01/2000    LE    COLECTOMY PARTIAL  01/01/1983     10 cm, diverticulitis     COLECTOMY SUBTOTAL  1983    10 cm, diverticulitis    COLONOSCOPY  02/13/2012    Procedure:COLONOSCOPY; Surgeon:SLOAN GALLARDO; Location: GI    COLONOSCOPY N/A 01/22/2020    Procedure: Colonoscopy, With Polypectomy And Biopsy;  Surgeon: Aston Kiran MD;  Location:  GI    CV CORONARY ANGIOGRAM N/A 06/02/2020    Procedure: Coronary Angiogram;  Surgeon: Alona Florentino MD;  Location: [facility] Cath Lab;  Service: Cardiology    CV CORONARY ANGIOGRAM N/A 09/20/2021    Procedure: Coronary Angiogram;  Surgeon: Adam Agudelo MD;  Location: [facility] CV    CV LEFT HEART CATHETERIZATION WITHOUT LEFT VENTRICULOGRAM Left 06/02/2020    Procedure: Left Heart Catheterization Without Left Ventriculogram;  Surgeon: Alona Florentino MD;  Location: [facility] Cath Lab;  Service: Cardiology    CV SUPRAVALVULAR AORTOGRAM N/A 09/20/2021    Procedure: Supravalvular Aortagram;  Surgeon: Adam Agudelo MD;  Location: [facility] CV    ESOPHAGOSCOPY, GASTROSCOPY, DUODENOSCOPY (EGD), COMBINED   02/13/2012    Procedure:COMBINED ESOPHAGOSCOPY, GASTROSCOPY, DUODENOSCOPY (EGD); Surgeon:SLOAN GALLARDO; Location:[facility] GI    ESOPHAGOSCOPY, GASTROSCOPY, DUODENOSCOPY (EGD), COMBINED  02/13/2012    EXTRACAPSULAR CATARACT EXTRATION WITH INTRAOCULAR LENS IMPLANT  4-20-10, 6-1-10    Rt, Lt    HERNIA REPAIR  1995    Lt inguinal    HERNIA REPAIR  01/01/1995    Lt inguinal    HIP SURGERY      1981, bilat MITZI, revised 2001, 2005    HIP SURGERY  01/01/1981    bilat MITZI, revised 2001, 2005    IR FINE NEEDLE ASPIRATION W ULTRASOUND  04/10/2023    KIDNEY SURGERY  1978 and 1993    transplant    KNEE SURGERY  1983, 1987    bilat TKA    MOHS MICROGRAPHIC PROCEDURE      MOHS MICROGRAPHIC PROCEDURE      ORTHOPEDIC SURGERY      OTHER SURGICAL HISTORY      kidney [id number], 1993    PHACOEMULSIFICATION CLEAR CORNEA WITH STANDARD INTRAOCULAR LENS IMPLANT Right 04/19/2000    PHACOEMULSIFICATION CLEAR CORNEA WITH STANDARD INTRAOCULAR LENS IMPLANT Left 06/01/2000    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, auxiliary spleen    TONSILLECTOMY      TRANSPLANT      VASCULAR SURGERY  1976       Family History   Problem Relation Age of Onset    Cardiovascular Father         AI with valve repair    Hypertension Father     Kidney Disease Father     Other - See Comments Father         AI with valve repair    Cerebrovascular Disease Maternal Grandfather     Other - See Comments Maternal Grandfather         cerebrovascular disease    Cancer Paternal Grandmother         ovarian ca    Ovarian Cancer Paternal Grandmother     Cerebrovascular Disease Paternal Grandfather     Kidney Disease Paternal Aunt     Kidney Disease Paternal Aunt     Skin Cancer No family hx of     Glaucoma No family hx of     Macular Degeneration No family hx of     Amblyopia No family hx of     Melanoma No family hx of     Diabetes No family hx of        Social History     Tobacco Use    Smoking status: Former     Packs/day: 1.00     Years: 9.00     Pack years: 9.00      "Types: Cigarettes     Start date: 1980     Quit date: 1988     Years since quittin.6    Smokeless tobacco: Former   Vaping Use    Vaping Use: Never used   Substance Use Topics    Alcohol use: Not Currently     Comment: quit drinking 2023    Drug use: Not Currently     Types: Oxycodone       No current outpatient medications on file.        Physical Exam     First Vitals:  Patient Vitals for the past 24 hrs:   BP Temp Temp src Pulse Resp SpO2 Height Weight   23 1920 95/59 -- -- 90 -- 94 % -- --   23 1900 (!) 88/59 -- -- 90 -- 96 % -- --   23 1849 -- -- -- 89 -- 95 % -- --   23 1820 -- -- -- 90 -- 94 % -- --   23 1801 -- -- -- 90 -- 95 % -- --   23 1800 93/61 -- -- 88 -- 94 % -- --   23 1545 (!) 85/55 98.8  F (37.1  C) -- 94 20 93 % -- --   23 1403 -- -- -- 96 -- -- -- --   23 1025 -- -- -- -- -- 95 % -- --   23 0832 (!) 146/78 99.2  F (37.3  C) Oral 109 20 100 % 1.727 m (5' 8\") 81.6 kg (180 lb)         PHYSICAL EXAM    General Appearance:  Alert, cooperative, no distress, appears stated age. GCS 15.  Respiratory: No distress. Lungs clear to ausculation bilaterally. No wheezes, rhonchi or stridor  Cardiovascular: Regular rate and rhythm, no murmur. Normal cap refill. No peripheral edema. Pulses 2+ in the UE and LE.  GI: Abdomen soft, nontender, normal bowel sounds  : No CVA tenderness  Musculoskeletal: Moving all extremities. No gross deformities. Strength 5/5 in the UE and LE.  Integument: Warm, dry, no rashes or lesions  Neurologic: Alert and orientated x3. CN exam WNL. No focal deficits.  Psych: Normal mood and affect        Results     LAB:  All pertinent labs reviewed and interpreted  Labs Ordered and Resulted from Time of ED Arrival to Time of ED Departure   CBC WITH PLATELETS - Abnormal       Result Value    WBC Count 11.1 (*)     RBC Count 4.69      Hemoglobin 12.5 (*)     Hematocrit 40.1      MCV 86      MCH 26.7      MCHC 31.2 (*) "     RDW 15.7 (*)     Platelet Count 417     COMPREHENSIVE METABOLIC PANEL - Abnormal    Sodium 135 (*)     Potassium 3.1 (*)     Chloride 97 (*)     Carbon Dioxide (CO2) 20 (*)     Anion Gap 18 (*)     Urea Nitrogen 13.1      Creatinine 0.87      Calcium 9.2      Glucose 100 (*)     Alkaline Phosphatase 70      AST 23      ALT 9      Protein Total 6.4      Albumin 3.4 (*)     Bilirubin Total 2.0 (*)     GFR Estimate >90     TROPONIN T, HIGH SENSITIVITY - Abnormal    Troponin T, High Sensitivity 37 (*)    MAGNESIUM - Abnormal    Magnesium 1.2 (*)    TROPONIN T, HIGH SENSITIVITY - Abnormal    Troponin T, High Sensitivity 42 (*)    ROUTINE UA WITH MICROSCOPIC REFLEX TO CULTURE - Abnormal    Color Urine Yellow      Appearance Urine Clear      Glucose Urine Negative      Bilirubin Urine Negative      Ketones Urine 10 (*)     Specific Gravity Urine 1.021      Blood Urine Negative      pH Urine 5.0      Protein Albumin Urine 10 (*)     Urobilinogen Urine <2.0      Nitrite Urine Negative      Leukocyte Esterase Urine Negative      Mucus Urine Present (*)     RBC Urine <1      WBC Urine 3      Squamous Epithelials Urine 1      Hyaline Casts Urine 12 (*)    PROCALCITONIN - Abnormal    Procalcitonin 1.29 (*)    INFLUENZA A/B, RSV, & SARS-COV2 PCR - Normal    Influenza A PCR Negative      Influenza B PCR Negative      RSV PCR Negative      SARS CoV2 PCR Negative     ETHYL ALCOHOL LEVEL - Normal    Alcohol ethyl <0.01     MAGNESIUM - Normal    Magnesium 2.2     POTASSIUM - Normal    Potassium 3.7     MYCOPHENOLIC ACID BY TANDEM MASS SPECTROMETRY   BLOOD CULTURE   BLOOD CULTURE       RADIOLOGY:  XR Chest 2 Views   Final Result   IMPRESSION: Sternotomy. Some slight atelectasis left lung base unchanged. No acute new findings.            ECG:  Performed at: 08:38    Impression: sinus tachycardia with prolonged QT    Rate: 104  Rhythm: sinus tach  Axis: normal  OR Interval: 140  QRS Interval: 78  QTc Interval: 552  ST Changes: no ST  changes  Comparison: QT has lengthened    EKG results reviewed and interpreted by Dr. Brand, ED MD.         Dasia Santacruz PA-C   Emergency Medicine   [facility] EMERGENCY DEPARTMENT       Dasia Santacruz PA-C  08/30/23 2127

## 2023-08-30 NOTE — H&P
[facility]    History and Physical - Hospitalist Service       Date of Admission:  8/30/2023    Assessment & Plan      Jerad Ross is a 63 year old male admitted on 8/30/2023. He has history of coronary artery disease, chronic hepatitis B, CKD with renal transplant and immunosuppression, mood disorder, COPD, GERD, chronic alcohol use presented with generalized weakness.    Generalized weakness-undetermined etiology  -Patient presented with 3 to 3 days of generalized weakness.  Lives independently otherwise.  Check UA and chest x-ray as patient had low-grade fever of 99.2 with mild leukocytosis on arrival.  Check procalcitonin.  -Start patient on empiric meropenem  -Noted hypokalemia and hypomagnesemia--replete  -Keep patient on IVF  -Place on fall precautions  -PT/OT consulted      Nausea and vomiting  -Patient states started 3 days ago, currently is only dry heaving  -Noted prolonged QTc avoid QT prolonging meds/Zofran  -Compazine as needed order  -Clear liquid diet, IVF      SIRS  -Patient presented with low-grade fever and leukocytosis.  Check chest x-ray, UA and procalcitonin  -Blood cultures ordered  -Start empiric meropenem    Hypertension-Stable  -Continue PTA metoprolol    Hypokalemia-replete per protocol    Hypomagnesemia-replete per protocol    PE - recently diagnosed  -Continue PTA xarelto    Chronic alcohol use  -Patient denies any recent alcohol use, states have not had any since previous admission  -Blood alcohol level today is negative.  -Monitor closely  -Continue PTA naltrexone    History of CKD  -Renal transplant/immunosuppression  -Continue PTA prednisone, mycophenolate  -Check mycophenolate levels    H/o CAD- Not anginal symptoms currently  -S/p CABG  -Continue PTA aspirin,metoprolol,Imdur, statin, nitroglycerin as needed    Chronic hepatitis B  -Continue PTA Entecavir    Mood disorder-continue PTA Prozac    GERD-PPI    COPD-not in exacerbation  -Continue PTA  "inhalers         Diet: Combination Diet Clear Liquid    DVT Prophylaxis: DOAC  [place] Catheter: Not present  Lines: None     Cardiac Monitoring: None  Code Status: No CPR- Do NOT Intubate      Clinically Significant Risk Factors Present on Admission        # Hypokalemia: Lowest K = 3.1 mmol/L in last 2 days, will replace as needed     # Hypomagnesemia: Lowest Mg = 1.2 mg/dL in last 2 days, will replace as needed   # Hypoalbuminemia: Lowest albumin = 3.4 g/dL at 8/30/2023  8:58 AM, will monitor as appropriate    # Drug Induced Coagulation Defect: home medication list includes an anticoagulant medication    # Drug Induced Platelet Defect: home medication list includes an antiplatelet medication     # Hypertension: Noted on problem list      # Overweight: Estimated body mass index is 27.37 kg/m  as calculated from the following:    Height as of this encounter: 1.727 m (5' 8\").    Weight as of this encounter: 81.6 kg (180 lb).              Disposition Plan      Expected Discharge Date: 08/31/2023                  Barbara Phan MD  Hospitalist Service  [facility]  Securely message with myQaa (more info)  Text page via [facility] Paging/Directory     ______________________________________________________________________    Chief Complaint   Generalized weakness    History is obtained from the patient, chart review    History of Present Illness   Jerad Ross is a 63 year old male with history of chronic alcohol use, history of CKD status postrenal transplant and on immunosuppression, coronary artery disease, mood disorder, chronic hepatitis, COPD, GERD presented today with chief complaints of generalized weakness.  Patient states has been having nausea and vomiting for the past 3 days and currently is dry heaving.  Lives independently and has been feeling weak and unable to take care of himself at home.  Denies any fever, chest pain, shortness of breath.  Feeling very exhausted and recent dose of " during my encounter but easily arousable.  Denies any diarrhea or constipation.  No abdominal pain.  Generalized weakness.  No tingling or numbness.  No exposures to COVID.  No recent travel.      On arrival to ED, patient is hemodynamically stable.  Labs reveal significant for sodium 135, K3.1, magnesium 1.2.  Mild leukocytosis with WBC of 11.1.  COVID, influenza a and B, RSV negative.      Past Medical History    Past Medical History:   Diagnosis Date    Acute midline low back pain without sciatica     AION (acute ischemic optic neuropathy)     Anemia in chronic renal disease     Arthritis     Avascular necrosis of bones of both hips (H)     s/p bilateral hip replacements    Avascular necrosis of bones of both hips (H)     s/p bilateral hip replacements    Basal cell carcinoma     Chronic hepatitis B (H)     Clostridium difficile colitis     COPD (chronic obstructive pulmonary disease) (H)     Coronary artery disease involving native coronary artery of native heart without angina pectoris 06/17/2014    Coronary angiogram 6/17/14: Severe distal 3-vessel disease involving small vessels, not amenable to PCI or CABG.    CRP elevated     Depression     Depressive disorder     Diverticulosis     Dyslipidemia     Elevated C-reactive protein (CRP)     FSGS (focal segmental glomerulosclerosis)     FSGS (focal segmental glomerulosclerosis)     Gastric ulcer with hemorrhage 02/12/2012    Gastric ulcer with hemorrhage 02/12/2012    GERD (gastroesophageal reflux disease)     Glaucoma     OHTN    Glaucoma     Hemorrhoids     Hemorrhoids     History of blood transfusion     HTN (hypertension)     Hyperlipidemia     Hypertension secondary to other renal disorders     Hypogonadism in male     Immunosuppressed status (H)     Kidney replaced by transplant     focal glomerulosclerosis     Kidney transplanted     focal glomerulosclerosis     NSTEMI (non-ST elevated myocardial infarction) (H) 06/17/2014    NSTEMI (non-ST elevated  myocardial infarction) (H) 06/17/2014    JULIANE (obstructive sleep apnea)     Doesn't use CPAP    Paracentral scotoma     LE    Secondary renal hyperparathyroidism (H)     Secondary renal hyperparathyroidism (H)     Septic bursitis     Squamous cell carcinoma     Uncomplicated asthma        Past Surgical History   Past Surgical History:   Procedure Laterality Date    APPENDECTOMY      ARTHROPLASTY REVISION HIP Right 6/19/2023    Procedure: RIGHT HIP REVISION;  Surgeon: Juan Mcdonough MD;  Location: [facility] OR    BIOPSY      Cardiac Bypass surgery  06/08/2020    [facility]     CATARACT EXTRACTION EXTRACAPSULAR W/ INTRAOCULAR LENS IMPLANTATION Bilateral 4-20-10, 6-1-10    CATARACT IOL, RT/LT  04/19/2000    RE    CATARACT IOL, RT/LT  06/01/2000    LE    COLECTOMY PARTIAL  01/01/1983     10 cm, diverticulitis     COLECTOMY SUBTOTAL  1983    10 cm, diverticulitis    COLONOSCOPY  02/13/2012    Procedure:COLONOSCOPY; Surgeon:SLOAN GALLARDO; Location: GI    COLONOSCOPY N/A 01/22/2020    Procedure: Colonoscopy, With Polypectomy And Biopsy;  Surgeon: Aston Kiran MD;  Location:  GI    CV CORONARY ANGIOGRAM N/A 06/02/2020    Procedure: Coronary Angiogram;  Surgeon: Alona Florentino MD;  Location: [facility] Cath Lab;  Service: Cardiology    CV CORONARY ANGIOGRAM N/A 09/20/2021    Procedure: Coronary Angiogram;  Surgeon: Adam Agduelo MD;  Location: [facility] CV    CV LEFT HEART CATHETERIZATION WITHOUT LEFT VENTRICULOGRAM Left 06/02/2020    Procedure: Left Heart Catheterization Without Left Ventriculogram;  Surgeon: Alona Florentino MD;  Location: [facility] Cath Lab;  Service: Cardiology    CV SUPRAVALVULAR AORTOGRAM N/A 09/20/2021    Procedure: Supravalvular Aortagram;  Surgeon: Adam Agudelo MD;  Location: [facility] CV    ESOPHAGOSCOPY, GASTROSCOPY, DUODENOSCOPY (EGD), COMBINED  02/13/2012    Procedure:COMBINED ESOPHAGOSCOPY, GASTROSCOPY, DUODENOSCOPY (EGD); Surgeon:PAULINA  SLOAN BRIONES; Location: GI    ESOPHAGOSCOPY, GASTROSCOPY, DUODENOSCOPY (EGD), COMBINED  02/13/2012    EXTRACAPSULAR CATARACT EXTRATION WITH INTRAOCULAR LENS IMPLANT  4-20-10, 6-1-10    Rt, Lt    HERNIA REPAIR  1995    Lt inguinal    HERNIA REPAIR  01/01/1995    Lt inguinal    HIP SURGERY      1981, bilat MITZI, revised 2001, 2005    HIP SURGERY  01/01/1981    bilat MITZI, revised 2001, 2005    IR FINE NEEDLE ASPIRATION W ULTRASOUND  04/10/2023    KIDNEY SURGERY  1978 and 1993    transplant    KNEE SURGERY  1983, 1987    bilat TKA    MOHS MICROGRAPHIC PROCEDURE      MOHS MICROGRAPHIC PROCEDURE      ORTHOPEDIC SURGERY      OTHER SURGICAL HISTORY      kidney [id number], 1993    PHACOEMULSIFICATION CLEAR CORNEA WITH STANDARD INTRAOCULAR LENS IMPLANT Right 04/19/2000    PHACOEMULSIFICATION CLEAR CORNEA WITH STANDARD INTRAOCULAR LENS IMPLANT Left 06/01/2000    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, auxiliary spleen    TONSILLECTOMY      TRANSPLANT      VASCULAR SURGERY  1976       Prior to Admission Medications   Prior to Admission Medications   Prescriptions Last Dose Informant Patient Reported? Taking?   FLUoxetine (PROZAC) 20 MG capsule Past Week at AM  No Yes   Sig: Take 1 capsule (20 mg) by mouth daily With 40mg for a total daily dose of 60mg   FLUoxetine (PROZAC) 40 MG capsule Past Week at AM Self No Yes   Sig: Take 1 capsule (40 mg) by mouth daily   Multiple Vitamins-Iron (ONE DAILY MULTIVITAMIN/IRON) TABS Past Week at AM Self Yes Yes   Sig: Take 1 tablet by mouth daily   Omega-3 Fatty Acids (OMEGA 3 PO) Past Week at AM Self Yes Yes   Sig: Take 1 capsule by mouth daily Dose unknown   Rivaroxaban ANTICOAGULANT 15 & 20 MG TBPK Starter Therapy Pack Past Week at AM  No Yes   Sig: Take 15 mg by mouth 2 times daily (with meals) for 21 days, THEN 20 mg daily with food for 9 days.   acetaminophen (TYLENOL) 500 MG tablet Unknown at PRN  Yes Yes   Sig: Take 1,000 mg by mouth 3 times daily as needed for mild pain    albuterol (PROAIR HFA) 108 (90 Base) MCG/ACT inhaler Unknown at PRN Self No Yes   Sig: Inhale 2 puffs into the lungs every 6 hours as needed for shortness of breath / dyspnea or wheezing   aspirin 81 MG EC tablet Past Week at AM  No Yes   Sig: Take 1 tablet (81 mg) by mouth daily   budesonide (PULMICORT FLEXHALER) 90 MCG/ACT inhaler Unknown at PRN  Yes Yes   Sig: Inhale 2 puffs into the lungs 2 times daily as needed (shortness of breath)   entecavir (BARACLUDE) 0.5 MG tablet Past Week at AM  No Yes   Sig: Take 1 tablet (0.5 mg) by mouth daily   fluticasone-salmeterol (ADVAIR) 250-50 MCG/ACT inhaler Unknown at PRN Self No Yes   Sig: Inhale 1 puff into the lungs 2 times daily as needed (PRN)   furosemide (LASIX) 20 MG tablet Unknown at PRN Self No Yes   Sig: Take 1 tablet (20 mg) by mouth daily as needed (fluid retention)   hydrOXYzine (ATARAX) 10 MG tablet Past Week at PRN  No Yes   Sig: Take 1 tablet (10 mg) by mouth every 8 hours as needed for anxiety   isosorbide mononitrate (IMDUR) 60 MG 24 hr tablet Past Week at AM  Yes Yes   Sig: Take 60 mg by mouth daily   latanoprost (XALATAN) 0.005 % ophthalmic solution  Self No No   Sig: Place 1 drop into both eyes At Bedtime   metoprolol succinate ER (TOPROL XL) 25 MG 24 hr tablet Past Week at AM  No Yes   Sig: Take 0.5 tablets (12.5 mg) by mouth daily   mycophenolate (GENERIC EQUIVALENT) 250 MG capsule Past Week at AM  No Yes   Sig: Take 3 capsules (750 mg) by mouth 2 times daily   naltrexone (DEPADE/REVIA) 50 MG tablet Past Week at AM  No Yes   Sig: Take 1 tablet (50 mg) by mouth daily Take 1/2 tablet (25 mg) daily for 1 week, then take 1 tablet (50 mg ) daily.   nitroGLYcerin (NITROSTAT) 0.4 MG sublingual tablet Unknown at PRN Self No Yes   Sig: Place 1 tablet (0.4 mg) under the tongue every 5 minutes as needed for chest pain   pantoprazole (PROTONIX) 40 MG EC tablet Past Week at AM Self No Yes   Sig: Take 1 tablet (40 mg) by mouth daily   polyethylene glycol  (MIRALAX) 17 g packet Unknown at PRN Self Yes Yes   Sig: Take 17 g by mouth daily as needed    predniSONE (DELTASONE) 5 MG tablet Past Week at AM Self No Yes   Sig: Take 1 tablet (5 mg) by mouth daily   rosuvastatin (CRESTOR) 20 MG tablet Past Week at AM Self No Yes   Sig: TAKE 1 TABLET(20 MG) BY MOUTH DAILY   tacrolimus (PROTOPIC) 0.1 % external ointment Unknown at PRN Self Yes Yes   Sig: Apply topically 2 times daily as needed      Facility-Administered Medications: None        Review of Systems    The 10 point Review of Systems is negative other than noted in the HPI or here.      Physical Exam   Vital Signs: Temp: 99.2  F (37.3  C) Temp src: Oral BP: (!) 146/78 Pulse: 109   Resp: 20 SpO2: 95 % O2 Device: None (Room air) Oxygen Delivery: 2 LPM  Weight: 180 lbs 0 oz    General: Pleasant male appears older than stated age, ill kempt and in no acute distress  HEENT:EOMI, AT,NC, oral cavity appears dry  CVS:RRR, no edema  RS:CTAB  Abd: Soft, NT,ND  Neurology:Grossly normal but generally weak  Psy:Approrpiate affect      Medical Decision Making       >55 MINUTES SPENT BY ME on the date of service doing chart review, history, exam, documentation & further activities per the note.      Data     I have personally reviewed the following data over the past 24 hrs:    11.1 (H)  \   12.5 (L)   / 417     135 (L) 97 (L) 13.1 /  100 (H)   3.1 (L) 20 (L) 0.87 \     ALT: 9 AST: 23 AP: 70 TBILI: 2.0 (H)   ALB: 3.4 (L) TOT PROTEIN: 6.4 LIPASE: N/A     Trop: 42 (H) BNP: N/A        good minus

## 2023-08-30 NOTE — ED NOTES
Bed: JNED-28  Expected date: 8/30/23  Expected time: 8:23 AM  Means of arrival: Ambulance  Comments:  SPF  66F  Cough, fever, weakness

## 2023-08-31 ENCOUNTER — APPOINTMENT (OUTPATIENT)
Dept: PHYSICAL THERAPY | Facility: HOSPITAL | Age: 63
DRG: 640 | End: 2023-08-31
Attending: HOSPITALIST
Payer: COMMERCIAL

## 2023-08-31 ENCOUNTER — APPOINTMENT (OUTPATIENT)
Dept: OCCUPATIONAL THERAPY | Facility: HOSPITAL | Age: 63
DRG: 640 | End: 2023-08-31
Payer: COMMERCIAL

## 2023-08-31 LAB
ALBUMIN SERPL BCG-MCNC: 2.6 G/DL (ref 3.5–5.2)
ALP SERPL-CCNC: 58 U/L (ref 40–129)
ALT SERPL W P-5'-P-CCNC: 7 U/L (ref 0–70)
ANION GAP SERPL CALCULATED.3IONS-SCNC: 10 MMOL/L (ref 7–15)
AST SERPL W P-5'-P-CCNC: 18 U/L (ref 0–45)
BILIRUB SERPL-MCNC: 1 MG/DL
BUN SERPL-MCNC: 10.9 MG/DL (ref 8–23)
CALCIUM SERPL-MCNC: 8.5 MG/DL (ref 8.8–10.2)
CHLORIDE SERPL-SCNC: 102 MMOL/L (ref 98–107)
CREAT SERPL-MCNC: 0.68 MG/DL (ref 0.67–1.17)
DEPRECATED HCO3 PLAS-SCNC: 23 MMOL/L (ref 22–29)
ERYTHROCYTE [DISTWIDTH] IN BLOOD BY AUTOMATED COUNT: 15.7 % (ref 10–15)
GFR SERPL CREATININE-BSD FRML MDRD: >90 ML/MIN/1.73M2
GLUCOSE SERPL-MCNC: 93 MG/DL (ref 70–99)
HCT VFR BLD AUTO: 32 % (ref 40–53)
HGB BLD-MCNC: 9.9 G/DL (ref 13.3–17.7)
MAGNESIUM SERPL-MCNC: 1.8 MG/DL (ref 1.7–2.3)
MCH RBC QN AUTO: 27 PG (ref 26.5–33)
MCHC RBC AUTO-ENTMCNC: 30.9 G/DL (ref 31.5–36.5)
MCV RBC AUTO: 87 FL (ref 78–100)
MYCOPHENOLATE SERPL LC/MS/MS-MCNC: 2.69 MG/L (ref 1–3.5)
MYCOPHENOLATE-G SERPL LC/MS/MS-MCNC: 11.9 MG/L (ref 30–95)
PLATELET # BLD AUTO: 353 10E3/UL (ref 150–450)
POTASSIUM SERPL-SCNC: 3.7 MMOL/L (ref 3.4–5.3)
PROT SERPL-MCNC: 5.4 G/DL (ref 6.4–8.3)
RBC # BLD AUTO: 3.67 10E6/UL (ref 4.4–5.9)
SODIUM SERPL-SCNC: 135 MMOL/L (ref 136–145)
TME LAST DOSE: ABNORMAL H
TME LAST DOSE: ABNORMAL H
WBC # BLD AUTO: 7.6 10E3/UL (ref 4–11)

## 2023-08-31 PROCEDURE — 97116 GAIT TRAINING THERAPY: CPT | Mod: GP

## 2023-08-31 PROCEDURE — 250N000013 HC RX MED GY IP 250 OP 250 PS 637: Performed by: STUDENT IN AN ORGANIZED HEALTH CARE EDUCATION/TRAINING PROGRAM

## 2023-08-31 PROCEDURE — 250N000012 HC RX MED GY IP 250 OP 636 PS 637: Performed by: HOSPITALIST

## 2023-08-31 PROCEDURE — 250N000011 HC RX IP 250 OP 636: Mod: JZ | Performed by: HOSPITALIST

## 2023-08-31 PROCEDURE — 250N000013 HC RX MED GY IP 250 OP 250 PS 637: Performed by: INTERNAL MEDICINE

## 2023-08-31 PROCEDURE — 120N000001 HC R&B MED SURG/OB

## 2023-08-31 PROCEDURE — 99232 SBSQ HOSP IP/OBS MODERATE 35: CPT | Performed by: HOSPITALIST

## 2023-08-31 PROCEDURE — 258N000003 HC RX IP 258 OP 636: Performed by: HOSPITALIST

## 2023-08-31 PROCEDURE — 97535 SELF CARE MNGMENT TRAINING: CPT | Mod: GO

## 2023-08-31 PROCEDURE — 97162 PT EVAL MOD COMPLEX 30 MIN: CPT | Mod: GP

## 2023-08-31 PROCEDURE — 36415 COLL VENOUS BLD VENIPUNCTURE: CPT | Performed by: HOSPITALIST

## 2023-08-31 PROCEDURE — 83735 ASSAY OF MAGNESIUM: CPT | Performed by: HOSPITALIST

## 2023-08-31 PROCEDURE — 250N000013 HC RX MED GY IP 250 OP 250 PS 637: Performed by: HOSPITALIST

## 2023-08-31 PROCEDURE — 80053 COMPREHEN METABOLIC PANEL: CPT | Performed by: HOSPITALIST

## 2023-08-31 PROCEDURE — 85027 COMPLETE CBC AUTOMATED: CPT | Performed by: HOSPITALIST

## 2023-08-31 RX ORDER — HYDROXYZINE HYDROCHLORIDE 10 MG/1
10 TABLET, FILM COATED ORAL EVERY 8 HOURS PRN
Status: DISCONTINUED | OUTPATIENT
Start: 2023-08-31 | End: 2023-09-01 | Stop reason: HOSPADM

## 2023-08-31 RX ADMIN — FLUTICASONE FUROATE AND VILANTEROL TRIFENATATE 1 PUFF: 100; 25 POWDER RESPIRATORY (INHALATION) at 09:55

## 2023-08-31 RX ADMIN — FLUOXETINE 20 MG: 20 CAPSULE ORAL at 09:57

## 2023-08-31 RX ADMIN — MYCOPHENOLATE MOFETIL 750 MG: 250 CAPSULE ORAL at 10:00

## 2023-08-31 RX ADMIN — SODIUM CHLORIDE: 9 INJECTION, SOLUTION INTRAVENOUS at 02:57

## 2023-08-31 RX ADMIN — PREDNISONE 5 MG: 5 TABLET ORAL at 09:57

## 2023-08-31 RX ADMIN — MEROPENEM 1 G: 1 INJECTION, POWDER, FOR SOLUTION INTRAVENOUS at 06:41

## 2023-08-31 RX ADMIN — Medication 1 TABLET: at 09:58

## 2023-08-31 RX ADMIN — HYDROXYZINE HYDROCHLORIDE 10 MG: 10 TABLET ORAL at 19:05

## 2023-08-31 RX ADMIN — Medication 1 MG: at 22:42

## 2023-08-31 RX ADMIN — FLUOXETINE 40 MG: 20 CAPSULE ORAL at 09:55

## 2023-08-31 RX ADMIN — MYCOPHENOLATE MOFETIL 750 MG: 250 CAPSULE ORAL at 19:05

## 2023-08-31 RX ADMIN — MEROPENEM 1 G: 1 INJECTION, POWDER, FOR SOLUTION INTRAVENOUS at 14:07

## 2023-08-31 RX ADMIN — Medication 81 MG: at 09:56

## 2023-08-31 RX ADMIN — PANTOPRAZOLE SODIUM 40 MG: 40 TABLET, DELAYED RELEASE ORAL at 09:56

## 2023-08-31 RX ADMIN — RIVAROXABAN 15 MG: 15 TABLET, FILM COATED ORAL at 09:57

## 2023-08-31 RX ADMIN — ACETAMINOPHEN 1000 MG: 500 TABLET ORAL at 04:30

## 2023-08-31 RX ADMIN — ENTECAVIR 0.5 MG: 0.5 TABLET ORAL at 09:59

## 2023-08-31 RX ADMIN — LATANOPROST 1 DROP: 50 SOLUTION OPHTHALMIC at 22:01

## 2023-08-31 RX ADMIN — MEROPENEM 1 G: 1 INJECTION, POWDER, FOR SOLUTION INTRAVENOUS at 22:00

## 2023-08-31 RX ADMIN — ACETAMINOPHEN 1000 MG: 500 TABLET ORAL at 22:42

## 2023-08-31 RX ADMIN — RIVAROXABAN 15 MG: 15 TABLET, FILM COATED ORAL at 17:14

## 2023-08-31 RX ADMIN — ROSUVASTATIN CALCIUM 20 MG: 10 TABLET, FILM COATED ORAL at 09:59

## 2023-08-31 RX ADMIN — METOPROLOL SUCCINATE 12.5 MG: 25 TABLET, EXTENDED RELEASE ORAL at 09:58

## 2023-08-31 RX ADMIN — SODIUM CHLORIDE: 9 INJECTION, SOLUTION INTRAVENOUS at 14:07

## 2023-08-31 RX ADMIN — NALTREXONE HYDROCHLORIDE 50 MG: 50 TABLET, FILM COATED ORAL at 10:00

## 2023-08-31 RX ADMIN — ISOSORBIDE MONONITRATE 60 MG: 30 TABLET, EXTENDED RELEASE ORAL at 09:58

## 2023-08-31 ASSESSMENT — ACTIVITIES OF DAILY LIVING (ADL)
ADLS_ACUITY_SCORE: 39
ADLS_ACUITY_SCORE: 39
DEPENDENT_IADLS:: SHOPPING;TRANSPORTATION
ADLS_ACUITY_SCORE: 39

## 2023-08-31 NOTE — PLAN OF CARE
Problem: Plan of Care - These are the overarching goals to be used throughout the patient stay.    Goal: Plan of Care Review  Description: The Plan of Care Review/Shift note should be completed every shift.  The Outcome Evaluation is a brief statement about your assessment that the patient is improving, declining, or no change.  This information will be displayed automatically on your shift note.  Outcome: Progressing   Goal Outcome Evaluation: Patient is alert and oriented. CIWA score low. BP 95/64, P 71. Good appetite.

## 2023-08-31 NOTE — ED NOTES
[facility] ED Handoff Report    ED Chief Complaint: Shortness of breath; Nausea and Cough.     ED Diagnosis:  (R53.1) General weakness  Comment:   Plan:     (E83.42) Hypomagnesemia  Comment:   Plan:     (E87.6) Hypokalemia  Comment:   Plan:     (R26.2) Unable to ambulate  Comment:   Plan:     (Z91.148) Nonadherence to medication  Comment:   Plan:        PMH:    Past Medical History:   Diagnosis Date    Acute midline low back pain without sciatica     AION (acute ischemic optic neuropathy)     Anemia in chronic renal disease     Arthritis     Avascular necrosis of bones of both hips (H)     s/p bilateral hip replacements    Avascular necrosis of bones of both hips (H)     s/p bilateral hip replacements    Basal cell carcinoma     Chronic hepatitis B (H)     Clostridium difficile colitis     COPD (chronic obstructive pulmonary disease) (H)     Coronary artery disease involving native coronary artery of native heart without angina pectoris 06/17/2014    Coronary angiogram 6/17/14: Severe distal 3-vessel disease involving small vessels, not amenable to PCI or CABG.    CRP elevated     Depression     Depressive disorder     Diverticulosis     Dyslipidemia     Elevated C-reactive protein (CRP)     FSGS (focal segmental glomerulosclerosis)     FSGS (focal segmental glomerulosclerosis)     Gastric ulcer with hemorrhage 02/12/2012    Gastric ulcer with hemorrhage 02/12/2012    GERD (gastroesophageal reflux disease)     Glaucoma     OHTN    Glaucoma     Hemorrhoids     Hemorrhoids     History of blood transfusion     HTN (hypertension)     Hyperlipidemia     Hypertension secondary to other renal disorders     Hypogonadism in male     Immunosuppressed status (H)     Kidney replaced by transplant     focal glomerulosclerosis     Kidney transplanted     focal glomerulosclerosis     NSTEMI (non-ST elevated myocardial infarction) (H) 06/17/2014    NSTEMI (non-ST elevated myocardial infarction) (H) 06/17/2014    JULIANE  "(obstructive sleep apnea)     Doesn't use CPAP    Paracentral scotoma     LE    Secondary renal hyperparathyroidism (H)     Secondary renal hyperparathyroidism (H)     Septic bursitis     Squamous cell carcinoma     Uncomplicated asthma         Code Status:  No CPR- Do NOT Intubate     Falls Risk: Yes Band: Applied    Current Living Situation/Residence: lives in a house ; has a roommate.     Elimination Status: Continent: No and wears briefs;  patient was able to use bedside urinal.  Patient reports recently becoming incontinent and wearing briefs.    Activity Level: SBA w/ walker; patient reports intermittently using walker at home as needed, but increasingly weaker recently.      Patients Preferred Language:  English     Needed: No    Vital Signs:  BP 95/59   Pulse 90   Temp 98.8  F (37.1  C)   Resp 20   Ht 1.727 m (5' 8\")   Wt 81.6 kg (180 lb)   SpO2 94%   BMI 27.37 kg/m       Cardiac Rhythm: Normal Sinus Rhythm.    Pain Score: 0/10    Is the Patient Confused:  No    Last Food or Drink: 08/30/23 at 1830.    Focused Assessment:  Patient arrived to ED this morning via EMS due to shortness of breath, cough and nausea.  Patient recently admitted for PE and discharged on 08-22-23.  Patient had been non-compliant with medications since discharge and just became more weak.  History of ETOH use.  Patient is no longer on O2 at this time in ED and sats are 94% on RA.  Has been receiving continuous IV fluids and has been eating/drinking clear liquids.  Alert and oriented x3.     Tests Performed: Done: Labs and Imaging    Treatments Provided:  See MAR.    Family Dynamics/Concerns: No    Family Updated On Visitor Policy: No    Plan of Care Communicated to Family: No    Who Was Updated about Plan of Care: When writer asked patient if family or friend could be or needed to be updated, patient stated \"no, I can let them know.\"  Cell phone and glasses at bedside.     Belongings Checklist Done and Signed by " Patient: Yes    Medications sent with patient:     Covid: symptomatic, negative    Additional Information: Appears slightly disheveled in appearance, but very cooperative and polite.  Patient did report it's been difficult for him to take care of himself at home.     RN: Lindsay Martinez RN 8/30/2023 7:22 PM

## 2023-08-31 NOTE — PROGRESS NOTES
Care Management Follow Up    Length of Stay (days): 1    Expected Discharge Date: 09/01/2023       Additional Information:  Pt expressing interest in CD treatment after TCU.  Met with pt to discuss, pt has been working with [facility] on completing a substance use assessment and treatment options.  Pt plans to follow up with them and updated on plan for TCU stay initially.    ESTEE Winter

## 2023-08-31 NOTE — PROGRESS NOTES
08/31/23 [id number]   Appointment Info   Signing Clinician's Name / Credentials (OT) Viridiana Levine OTR/L   Self-Care/Home Management   Self-Care/Home Mgmt/ADL, Compensatory, Meal Prep Minutes (31996) 28   Symptoms Noted During/After Treatment (Meal Preparation/Planning Training) fatigue   Treatment Detail/Skilled Intervention Sup semi-duran > sitting EOB. CGA sit > stand from elevated bed. CGA with FWW and cues for navigation of small space for mobility to toilet. CGA with grab bar and cues for hand placement for sit <> stand toilet transfer and clothing management. Pt completes most telma-cares while standing but assist provided for thoroughness. SBA for standing hand washing and face washing at sink ~3 min - pt fatigues. Educ pt on pacing self and pt choses to return to recliner. CGA with FWW mobility to recliner. Setup for oral cares while seated. Mod A for hair brushing as pt unable to reach d/t R shoulder deficits. Min A to don underwear - pt unable to thread RLE and reports he uses AE. CGA sit <> stand from recliner to [place].   OT Discharge Planning   OT Plan SLUMs, transfers/toileting, G/H, LB dress w AE   OT Discharge Recommendation (DC Rec) Transitional Care Facility   OT Rationale for DC Rec needs assist of 1 w/all ADLs and mobility. pt reports he'd like to d/c to TCU then CD tx.   OT Brief overview of current status CGA transfers, light assist ADLs   Total Session Time   Timed Code Treatment Minutes 28   Total Session Time (sum of timed and untimed services) 28

## 2023-08-31 NOTE — PROGRESS NOTES
08/31/23 0845   Appointment Info   Signing Clinician's Name / Credentials (PT) Shannen Singleton,PT   Living Environment   People in Home other (see comments)  (room mate)   Current Living Arrangements house   Home Accessibility stairs to enter home   Number of Stairs, Main Entrance 4   Stair Railings, Main Entrance railings on both sides of stairs   Number of Stairs, Within Home, Primary greater than 10 stairs   Stair Railings, Within Home, Primary railing on right side (ascending)   Living Environment Comments was geting home therapy after last hospital stay - bath aide, RN and OT/PT   Self-Care   Equipment Currently Used at Home shower chair;grab bar, tub/shower;walker, rolling;cane, straight  (fwwx2- was weening off FWW)   Fall history within last six months no   General Information   Onset of Illness/Injury or Date of Surgery 08/30/23   Referring Physician Dr. Barbara Phan   Patient/Family Therapy Goals Statement (PT) not sure   Pertinent History of Current Problem (include personal factors and/or comorbidities that impact the POC) admit weakness, nausea, vomiting- nonadherence to meds pt has h/o recent hospital stay 8/20/23-8/22/23 PE, mulitple TKA,MITZI with revision, CAD, hep B, CKD, renal transplant, COPD   Weight-Bearing Status - LLE other (see comments)  (pt has leg length discrepancy L shorter then R, reports he has order to get shoe built up sometime in future)   General Observations pt in bed,agreeable to PT   Cognition   Affect/Mental Status (Cognition) WFL   Pain Assessment   Patient Currently in Pain No   Range of Motion (ROM)   ROM Comment BLE WFL   Strength (Manual Muscle Testing)   Strength Comments BLE WFL   Bed Mobility   Impairments Contributing to Impaired Bed Mobility other (see comments)  (feels weak)   Assistive Device (Bed Mobility) bed rails   Comment, (Bed Mobility) SBA with bedrail , increased time to complete   Transfers   Impairments Contributing to Impaired Transfers impaired  balance;other (see comments)  (feels weak)   Comment, (Transfers) CGA w/ FWW   Gait/Stairs (Locomotion)   Williamson Level (Gait) contact guard   Assistive Device (Gait) walker, front-wheeled   Distance in Feet 15   Distance in Feet (Gait) 30'   Pattern (Gait) step-through   Deviations/Abnormal Patterns (Gait) weight shifting decreased   Comment, (Gait/Stairs) fatigued   Clinical Impression   Criteria for Skilled Therapeutic Intervention Yes, treatment indicated   PT Diagnosis (PT) decreased independent functional mobility   Influenced by the following impairments fatigue, decreased endurnce, decreased balance   Functional limitations due to impairments trasnfers. gait , stairs   Clinical Presentation (PT Evaluation Complexity) Stable/Uncomplicated   Clinical Presentation Rationale Patient presents as medically diagnosed   Clinical Decision Making (Complexity) low complexity   Planned Therapy Interventions (PT) balance training;gait training;strengthening;transfer training;progressive activity/exercise   Anticipated Equipment Needs at Discharge (PT)   (pt has FWW at home)   Risk & Benefits of therapy have been explained evaluation/treatment results reviewed   PT Total Evaluation Time   PT Eval, Moderate Complexity Minutes (24134) 13   Physical Therapy Goals   PT Frequency Daily   PT Predicted Duration/Target Date for Goal Attainment 09/07/23   PT Goals Stairs   PT: Transfers Modified independent;Bed to/from chair;Assistive device   PT: Gait Supervision/stand-by assist;Greater than 200 feet;Rolling walker   PT: Stairs 4 stairs;Rail on both sides  (CGA)   Interventions   Interventions Quick Adds Gait Training   Gait Training   Gait Training Minutes (72589) 8   Symptoms Noted During/After Treatment (Gait Training) dizziness;fatigue   Treatment Detail/Skilled Intervention pt slow pace, reports feeling dizziness with ambulation, pt able to return to room vitals take /77, HR 76, O2 97% on RA   Williamson Level  (Gait Training) contact guard   Physical Assistance Level (Gait Training) 1 person assist   Assistive Device (Gait Training) rolling walker   Gait Analysis Deviations decreased paola;decreased weight-shifting ability;decreased step length   Impairments (Gait Analysis/Training) strength decreased;balance impaired;other (see comments)  (decreased endurence)   PT Discharge Planning   PT Plan all transfer, gait, LE ex, stairs   PT Discharge Recommendation (DC Rec) Transitional Care Facility   PT Rationale for DC Rec pt unable to care for self at this time, decreased endurence and I mobility   PT Brief overview of current status pt CGA trasnfers w/ FWW, pt limited mobility w/ FWW and dizziness with ambulation   Total Session Time   Timed Code Treatment Minutes 8   Total Session Time (sum of timed and untimed services) 21

## 2023-08-31 NOTE — CONSULTS
Care Management Initial Consult    General Information  Assessment completed with: Jerad Phillip  Type of CM/SW Visit: Initial Assessment    Primary Care Provider verified and updated as needed: Yes   Readmission within the last 30 days: no previous admission in last 30 days         Advance Care Planning: Advance Care Planning Reviewed: other (see comments) (No ACP docs but DNR/DNI)          Communication Assessment  Patient's communication style: spoken language (English or Bilingual)             Cognitive  Cognitive/Neuro/Behavioral: WDL                      Living Environment:   People in home: friend(s)  Roomate  Current living Arrangements: house      Able to return to prior arrangements: other (see comments) (Depending on therapy recs)       Family/Social Support:  Care provided by: self  Provides care for: no one  Marital Status:   Sibling(s)          Description of Support System: Supportive         Current Resources:   Patient receiving home care services:       Community Resources:    Equipment currently used at home: grab bar, tub/shower, walker, rolling, cane, straight  Supplies currently used at home:      Employment/Financial:  Employment Status: disabled        Financial Concerns:             Does the patient's insurance plan have a 3 day qualifying hospital stay waiver?  No    Lifestyle & Psychosocial Needs:  Social Determinants of Health     Tobacco Use: Medium Risk (8/28/2023)    Patient History     Smoking Tobacco Use: Former     Smokeless Tobacco Use: Former     Passive Exposure: Not on file   Alcohol Use: Not on file   Financial Resource Strain: Not on file   Food Insecurity: Not on file   Transportation Needs: Not on file   Physical Activity: Not on file   Stress: Not on file   Social Connections: Not on file   Intimate Partner Violence: Not on file   Depression: At risk (8/28/2023)    PHQ-2     PHQ-2 Score: 4   Housing Stability: Not on file       Functional Status:  Prior to  "admission patient needed assistance:   Dependent ADLs:: Ambulation-cane, Ambulation-walker  Dependent IADLs:: Shopping, Transportation       Mental Health Status:          Chemical Dependency Status:  Chemical Dependency Status: Current Concern             Values/Beliefs:  Spiritual, Cultural Beliefs, Restoration Practices, Values that affect care:                 Additional Information:  Assessed. Pt lives with a roommate in a house setting. Pt uses walker, or cane for mobility. Pt recently discharge home with [facility] for home PT/OT/HHA/RN services. Pt has supportive siblings. Pt is disabled, and uses [employer] for transport. Pt states working on, \"getting a CM through the [facility]. \" Pt has Hx of ETOH. RNCM will refer to  for CD resources. Transport TBD.      Pt stated being interested in CD resources,  is aware and will address with pt.      Pt also agreed to a TCU, per therapy recs. Pt preferred [facility]. Writer sent to [facility] as well. Referrals sent pending.       [facility] could potentially accept pt today or tomorrow. RNCM called and left VMs for other TCU admissions, and a lot are still reviewing pt. Awaiting response from team. Per provider unclear at this time if pt will be completely medically ready tomorrow or not, not medically ready today.        Cm will continue to follow plan of care, review recommendations, and assist with any discharge needs anticipated.     Angeles Prater RN      "

## 2023-08-31 NOTE — PROGRESS NOTES
[facility]    Medicine Progress Note - Hospitalist Service    Date of Admission:  8/30/2023    Assessment & Plan      Jerad Ross is a 63 year old male admitted on 8/30/2023. He has history of coronary artery disease, chronic hepatitis B, CKD with renal transplant and immunosuppression, mood disorder, COPD, GERD, chronic alcohol use presented with generalized weakness.    Generalized weakness-undetermined etiology  -Patient presented with 3 days of generalized weakness.  Lives independently otherwise. UA and chest x-ray neg for any infection.On admission pt had low-grade fever of 99.2 with mild leukocytosis. Procalcitonin mildly up.   -Continue patient on empiric meropenem  -On fall precautions  -PT/OT recommend TCU on discharge      Nausea and vomiting - resolved   -Patient states started 3 days ago, currently is only dry heaving  -Noted prolonged QTc avoid QT prolonging meds/Zofran  -Compazine as needed order  -Tolerating CLD, ADAT  -Stop IVF when pt tolerating PO diet.       SIRS  -Patient presented with low-grade fever and leukocytosis.    -UA and chest x-ray neg for any infection.On admission pt had low-grade fever of 99.2 with mild leukocytosis. Procalcitonin mildly up.   -Blood cultures negative so far  -Continue empiric meropenem    Hypertension-Stable  -Continue PTA metoprolol with holding parameters    Hypokalemia-replete per protocol    Hypomagnesemia-replete per protocol    PE - recently diagnosed  -Continue PTA xarelto    Chronic alcohol use  -Patient denies any recent alcohol use, states have not had any since previous admission  -Blood alcohol level on admission is negative.  -Monitor closely  -Continue PTA naltrexone    History of CKD  -Renal transplant/immunosuppression  -Continue PTA prednisone, mycophenolate  -Mycophenolate levels pending    H/o CAD- Not anginal symptoms currently  -S/p CABG  -Continue PTA aspirin,metoprolol,Imdur, statin, nitroglycerin as  "needed    Chronic hepatitis B  -Continue PTA Entecavir    Mood disorder-continue PTA Prozac    GERD-PPI    COPD-not in exacerbation  -Continue PTA inhalers         Diet: Advance Diet as Tolerated: Full Liquid Diet    DVT Prophylaxis: DOAC  [place]wood Catheter: Not present  Lines: None     Cardiac Monitoring: None  Code Status: No CPR- Do NOT Intubate      Clinically Significant Risk Factors Present on Admission        # Hypokalemia: Lowest K = 3.1 mmol/L in last 2 days, will replace as needed    # Hypercalcemia: corrected calcium is >10.1, will monitor as appropriate  # Hypomagnesemia: Lowest Mg = 1.2 mg/dL in last 2 days, will replace as needed   # Hypoalbuminemia: Lowest albumin = 2.6 g/dL at 8/31/2023  5:57 AM, will monitor as appropriate    # Drug Induced Coagulation Defect: home medication list includes an anticoagulant medication    # Drug Induced Platelet Defect: home medication list includes an antiplatelet medication     # Hypertension: Noted on problem list      # Overweight: Estimated body mass index is 27.37 kg/m  as calculated from the following:    Height as of this encounter: 1.727 m (5' 8\").    Weight as of this encounter: 81.6 kg (180 lb).              Disposition Plan      Expected Discharge Date: 09/01/2023      Destination: home with help/services  Discharge Comments: Placement          Barbara Phan MD  Hospitalist Service  [facility]  Securely message with [email or web address] (more info)  Text page via [facility] Paging/Directory   ______________________________________________________________________    Interval History   Chart reviewed and events noted.  Patient seen and examined.   In bed, states no more dry heaving/nausea,vomiting today. No fever, slightly better but still weak. No chest pain,SOB, cough.       Physical Exam   Vital Signs: Temp: 98  F (36.7  C) Temp src: Oral BP: 95/64 Pulse: 71   Resp: 20 SpO2: 94 % O2 Device: None (Room air)    Weight: 180 lbs 0 oz    General: Pleasant " male appears older than stated age, ill kempt and in no acute distress  HEENT:EOMI, AT,N  CVS:RRR, no edema  RS:CTAB  Abd: Soft, NT,ND  Neurology:Grossly normal but generally weak  Psy:Approrpiate affect      Medical Decision Making       40 MINUTES SPENT BY ME on the date of service doing chart review, history, exam, documentation & further activities per the note.      Data     I have personally reviewed the following data over the past 24 hrs:    7.6  \   9.9 (L)   / 353     135 (L) 102 10.9 /  93   3.7 23 0.68 \     ALT: 7 AST: 18 AP: 58 TBILI: 1.0   ALB: 2.6 (L) TOT PROTEIN: 5.4 (L) LIPASE: N/A     Trop: 42 (H) BNP: N/A     Procal: 1.29 (H) CRP: N/A Lactic Acid: N/A

## 2023-08-31 NOTE — PLAN OF CARE
Problem: Plan of Care - These are the overarching goals to be used throughout the patient stay.    Goal: Plan of Care Review  Description: The Plan of Care Review/Shift note should be completed every shift.  The Outcome Evaluation is a brief statement about your assessment that the patient is improving, declining, or no change.  This information will be displayed automatically on your shift note.  Outcome: Progressing   Goal Outcome Evaluation:       Pt alert and oriented, VSS, denies SOB, N/V, NS infusing at 75, Mg/K protocol, recheck in the morning, PRN tylenol given for left shoulder pain, slept between cares

## 2023-09-01 ENCOUNTER — APPOINTMENT (OUTPATIENT)
Dept: PHYSICAL THERAPY | Facility: HOSPITAL | Age: 63
DRG: 640 | End: 2023-09-01
Payer: COMMERCIAL

## 2023-09-01 VITALS
HEART RATE: 80 BPM | BODY MASS INDEX: 27.28 KG/M2 | RESPIRATION RATE: 18 BRPM | OXYGEN SATURATION: 97 % | HEIGHT: 68 IN | TEMPERATURE: 97.9 F | WEIGHT: 180 LBS | SYSTOLIC BLOOD PRESSURE: 131 MMHG | DIASTOLIC BLOOD PRESSURE: 78 MMHG

## 2023-09-01 LAB
ATRIAL RATE - MUSE: 104 BPM
DIASTOLIC BLOOD PRESSURE - MUSE: 78 MMHG
INTERPRETATION ECG - MUSE: NORMAL
MAGNESIUM SERPL-MCNC: 1.9 MG/DL (ref 1.7–2.3)
P AXIS - MUSE: 42 DEGREES
POTASSIUM SERPL-SCNC: 3.4 MMOL/L (ref 3.4–5.3)
PR INTERVAL - MUSE: 140 MS
QRS DURATION - MUSE: 78 MS
QT - MUSE: 420 MS
QTC - MUSE: 552 MS
R AXIS - MUSE: -22 DEGREES
SYSTOLIC BLOOD PRESSURE - MUSE: 146 MMHG
T AXIS - MUSE: 103 DEGREES
VENTRICULAR RATE- MUSE: 104 BPM

## 2023-09-01 PROCEDURE — 250N000013 HC RX MED GY IP 250 OP 250 PS 637: Performed by: HOSPITALIST

## 2023-09-01 PROCEDURE — 84132 ASSAY OF SERUM POTASSIUM: CPT | Performed by: HOSPITALIST

## 2023-09-01 PROCEDURE — 250N000012 HC RX MED GY IP 250 OP 636 PS 637: Performed by: HOSPITALIST

## 2023-09-01 PROCEDURE — 97116 GAIT TRAINING THERAPY: CPT | Mod: GP

## 2023-09-01 PROCEDURE — 250N000013 HC RX MED GY IP 250 OP 250 PS 637: Performed by: STUDENT IN AN ORGANIZED HEALTH CARE EDUCATION/TRAINING PROGRAM

## 2023-09-01 PROCEDURE — 97110 THERAPEUTIC EXERCISES: CPT | Mod: GP

## 2023-09-01 PROCEDURE — 83735 ASSAY OF MAGNESIUM: CPT | Performed by: HOSPITALIST

## 2023-09-01 PROCEDURE — 36415 COLL VENOUS BLD VENIPUNCTURE: CPT | Performed by: HOSPITALIST

## 2023-09-01 PROCEDURE — 250N000011 HC RX IP 250 OP 636: Mod: JZ | Performed by: HOSPITALIST

## 2023-09-01 PROCEDURE — 99239 HOSP IP/OBS DSCHRG MGMT >30: CPT | Performed by: HOSPITALIST

## 2023-09-01 PROCEDURE — 250N000013 HC RX MED GY IP 250 OP 250 PS 637: Performed by: INTERNAL MEDICINE

## 2023-09-01 PROCEDURE — 258N000003 HC RX IP 258 OP 636: Performed by: HOSPITALIST

## 2023-09-01 RX ORDER — FLUTICASONE PROPIONATE AND SALMETEROL 250; 50 UG/1; UG/1
1 POWDER RESPIRATORY (INHALATION) 2 TIMES DAILY
Qty: 180 EACH | Refills: 3 | Status: SHIPPED | OUTPATIENT
Start: 2023-09-01

## 2023-09-01 RX ORDER — TRAZODONE HYDROCHLORIDE 50 MG/1
50 TABLET, FILM COATED ORAL ONCE
Status: COMPLETED | OUTPATIENT
Start: 2023-09-01 | End: 2023-09-01

## 2023-09-01 RX ADMIN — FLUOXETINE 20 MG: 20 CAPSULE ORAL at 09:06

## 2023-09-01 RX ADMIN — NALTREXONE HYDROCHLORIDE 50 MG: 50 TABLET, FILM COATED ORAL at 09:06

## 2023-09-01 RX ADMIN — Medication 1 TABLET: at 09:07

## 2023-09-01 RX ADMIN — METOPROLOL SUCCINATE 12.5 MG: 25 TABLET, EXTENDED RELEASE ORAL at 09:07

## 2023-09-01 RX ADMIN — ROSUVASTATIN CALCIUM 20 MG: 10 TABLET, FILM COATED ORAL at 09:07

## 2023-09-01 RX ADMIN — SODIUM CHLORIDE: 9 INJECTION, SOLUTION INTRAVENOUS at 05:12

## 2023-09-01 RX ADMIN — ENTECAVIR 0.5 MG: 0.5 TABLET ORAL at 09:06

## 2023-09-01 RX ADMIN — FLUTICASONE FUROATE AND VILANTEROL TRIFENATATE 1 PUFF: 100; 25 POWDER RESPIRATORY (INHALATION) at 09:15

## 2023-09-01 RX ADMIN — MYCOPHENOLATE MOFETIL 750 MG: 250 CAPSULE ORAL at 09:06

## 2023-09-01 RX ADMIN — MEROPENEM 1 G: 1 INJECTION, POWDER, FOR SOLUTION INTRAVENOUS at 06:38

## 2023-09-01 RX ADMIN — RIVAROXABAN 15 MG: 15 TABLET, FILM COATED ORAL at 09:06

## 2023-09-01 RX ADMIN — FLUOXETINE 40 MG: 20 CAPSULE ORAL at 09:11

## 2023-09-01 RX ADMIN — TRAZODONE HYDROCHLORIDE 50 MG: 50 TABLET ORAL at 00:19

## 2023-09-01 RX ADMIN — HYDROXYZINE HYDROCHLORIDE 10 MG: 10 TABLET ORAL at 13:28

## 2023-09-01 RX ADMIN — PREDNISONE 5 MG: 5 TABLET ORAL at 09:07

## 2023-09-01 RX ADMIN — ISOSORBIDE MONONITRATE 60 MG: 30 TABLET, EXTENDED RELEASE ORAL at 09:07

## 2023-09-01 RX ADMIN — Medication 81 MG: at 09:07

## 2023-09-01 RX ADMIN — ACETAMINOPHEN 1000 MG: 500 TABLET ORAL at 13:28

## 2023-09-01 RX ADMIN — PANTOPRAZOLE SODIUM 40 MG: 40 TABLET, DELAYED RELEASE ORAL at 09:07

## 2023-09-01 ASSESSMENT — ACTIVITIES OF DAILY LIVING (ADL)
ADLS_ACUITY_SCORE: 39

## 2023-09-01 NOTE — PLAN OF CARE
Occupational Therapy Discharge Summary    Reason for therapy discharge:    Discharged to transitional care facility.    Progress towards therapy goal(s). See goals on Care Plan in [facility] electronic health record for goal details.  Goals partially met.  Barriers to achieving goals:   discharge from facility.    Therapy recommendation(s):    Continued therapy is recommended.  Rationale/Recommendations:  to improve ADL independence.

## 2023-09-01 NOTE — PLAN OF CARE
Goal Outcome Evaluation: Vss. Patient slept overnight after one-time dose of trazadone. Able to make needs known. Continue to monitor.       Problem: Plan of Care - These are the overarching goals to be used throughout the patient stay.    Goal: Optimal Comfort and Wellbeing  Outcome: Progressing     Problem: Muscle Strength Impairment  Goal: Improved Muscle Strength  Outcome: Progressing  Intervention: Optimize Muscle Strength  Recent Flowsheet Documentation  Taken 9/1/2023 0004 by Nicole Verma, RN  Activity Management:   up in chair   ambulated in room  Activity Assistance Provided: assistance, stand-by

## 2023-09-01 NOTE — PROGRESS NOTES
Care Management Discharge Note    Discharge Date: 09/01/2023       Discharge Disposition: Transitional Care    Discharge Services:  TCU      Discharge Transportation: health plan transportation    Private Pay Cost: Yes    PAS Confirmation Code: [id number]    Education Provided on the Discharge Plan:  Per Care Team   Persons Notified of Discharge Plans: Yes  Patient/Family in Agreement with the Plan:      Handoff Referral Completed: Yes    Additional Information:  Final discharge plan is for pt to go to [facility] today. Pt agreeable to the private cost of wheelchair transport. [facility] Wheelchair transport set up for today 9/1 for 2:00pm-2:45pm. Bedside notified, facility, pt, and provider notified.     PAS completed.       Angeles Prater RN

## 2023-09-01 NOTE — PLAN OF CARE
Problem: Plan of Care - These are the overarching goals to be used throughout the patient stay.    Goal: Optimal Comfort and Wellbeing  Outcome: Progressing   Goal Outcome Evaluation:         Patient denies pain or discomfort, scored 1 on CIWA, writer was told that patient is on CIWA, writer did not see an order to do CIWA, wanted hydroxyzine prn, states he takes it at home, MD was updated and it was ordered and given prn for anxiety, remains on iv fluids, alert and oriented, vitals stable, denies pain or discomfort.

## 2023-09-01 NOTE — PLAN OF CARE
Physical Therapy Discharge Summary    Reason for therapy discharge:    Discharged to transitional care facility.    Progress towards therapy goal(s). See goals on Care Plan in [facility] electronic health record for goal details.  Goals partially met.  Barriers to achieving goals:   discharge from facility.    Therapy recommendation(s):    Recommend continued PT at [facility]

## 2023-09-01 NOTE — DISCHARGE SUMMARY
[facility] MEDICINE  DISCHARGE SUMMARY     Primary Care Physician: Aston Perry  Admission Date: 8/30/2023   Discharge Provider: Barbara Phan MD Discharge Date: 9/1/2023   Diet:   Active Diet and Nourishment Order   Procedures     Advance Diet as Tolerated: Regular Diet Adult     Diet       Code Status: No CPR- Do NOT Intubate   Activity: DCACTIVITY: Activity as tolerated        Condition at Discharge: Stable     REASON FOR PRESENTATION(See Admission Note for Details)     Weakness, electrolyte abnormalities, SIRS    PRINCIPAL & ACTIVE DISCHARGE DIAGNOSES     Principal Problem:    Nonadherence to medication  Active Problems:    Hypokalemia    Hypomagnesemia    General weakness    Unable to ambulate      PENDING LABS     Unresulted Labs Ordered in the Past 30 Days of this Admission       Date and Time Order Name Status Description    8/30/2023  1:28 PM Blood Culture Peripheral Blood Preliminary     8/30/2023  1:28 PM Blood Culture Peripheral Blood Preliminary               PROCEDURES ( this hospitalization only)          RECOMMENDATIONS TO OUTPATIENT PROVIDER FOR F/U VISIT     Follow-up Appointments     Follow Up and recommended labs and tests      Follow up with detention physician.  The following labs/tests are   recommended: CBC, BMP in 2 days.            DISPOSITION     Skilled Nursing Facility    SUMMARY OF HOSPITAL COURSE:      Jerad Ross is a 63 year old male admitted on 8/30/2023. He has history of coronary artery disease, chronic hepatitis B, CKD with renal transplant and immunosuppression, mood disorder, COPD, GERD, chronic alcohol use presented with generalized weakness.     Generalized weakness-undetermined etiology  -Patient presented with 3 days of generalized weakness.  Lived independently prior to admission. UA and chest x-ray neg for any infection.On admission pt had low-grade fever of 99.2 with mild leukocytosis. Procalcitonin mildly up.  Blood cx negative. Treated with empiric meropenem X 48 hours,stopped now.   -PT/OT recommend TCU on discharge        Nausea and vomiting - resolved   -Tolerating PO diet        SIRS  -Patient presented with low-grade fever and leukocytosis.    -UA and chest x-ray neg for any infection.Pro-calcitonin mildly up.   -Blood cultures negative so far  -Treated with empiric meropenem X 48 hours, off now.      Hypertension-Stable  -Continue PTA metoprolol with holding parameters     Hypokalemia-replete per protocol     Hypomagnesemia-replete per protocol     PE - recently diagnosed  -Continue PTA xarelto     Chronic alcohol use  -Patient denies any recent alcohol use, states have not had any since previous admission  -Blood alcohol level on admission is negative.  -Continue PTA naltrexone     History of CKD  -Renal transplant/immunosuppression  -Continue PTA prednisone, mycophenolate     H/o CAD- Not anginal symptoms currently  -S/p CABG  -Continue PTA aspirin,metoprolol,Imdur,statin, nitroglycerin as needed     Chronic hepatitis B  -Continue PTA Entecavir     Mood disorder-continue PTA Prozac     GERD-PPI     COPD-not in exacerbation  -Continue PTA inhalers    .Patient stable to be discharged to TCU today. Please refer to discharge medications and instructions for more details.      Discharge Medications with Med changes:     Current Discharge Medication List        CONTINUE these medications which have CHANGED    Details   fluticasone-salmeterol (ADVAIR) 250-50 MCG/ACT inhaler Inhale 1 puff into the lungs 2 times daily  Qty: 180 each, Refills: 3    Associated Diagnoses: Dyspnea on exertion; Wheezing           CONTINUE these medications which have NOT CHANGED    Details   acetaminophen (TYLENOL) 500 MG tablet Take 1,000 mg by mouth 3 times daily as needed for mild pain      albuterol (PROAIR HFA) 108 (90 Base) MCG/ACT inhaler Inhale 2 puffs into the lungs every 6 hours as needed for shortness of breath / dyspnea or  wheezing  Qty: 1 g, Refills: 11    Comments: Pharmacy may dispense brand covered by insurance (Proair, or proventil or ventolin or generic albuterol inhaler)  Associated Diagnoses: Wheezing      aspirin 81 MG EC tablet Take 1 tablet (81 mg) by mouth daily    Comments: Hold while on xarelto  Associated Diagnoses: Multiple subsegmental pulmonary emboli without acute cor pulmonale (H)      budesonide (PULMICORT FLEXHALER) 90 MCG/ACT inhaler Inhale 2 puffs into the lungs 2 times daily as needed (shortness of breath)      entecavir (BARACLUDE) 0.5 MG tablet Take 1 tablet (0.5 mg) by mouth daily  Qty: 90 tablet, Refills: 1    Associated Diagnoses: Chronic hepatitis B (H); Chronic viral hepatitis B without delta agent and without coma (H)      !! FLUoxetine (PROZAC) 20 MG capsule Take 1 capsule (20 mg) by mouth daily With 40mg for a total daily dose of 60mg  Qty: 30 capsule, Refills: 1    Associated Diagnoses: Recurrent major depressive disorder, in partial remission (H)      !! FLUoxetine (PROZAC) 40 MG capsule Take 1 capsule (40 mg) by mouth daily  Qty: 90 capsule, Refills: 2    Associated Diagnoses: Recurrent major depressive disorder, in partial remission (H)      furosemide (LASIX) 20 MG tablet Take 1 tablet (20 mg) by mouth daily as needed (fluid retention)  Qty: 90 tablet, Refills: 1    Associated Diagnoses: Dyspnea on exertion      hydrOXYzine (ATARAX) 10 MG tablet Take 1 tablet (10 mg) by mouth every 8 hours as needed for anxiety  Qty: 60 tablet, Refills: 1    Associated Diagnoses: Recurrent major depressive disorder, in partial remission (H)      isosorbide mononitrate (IMDUR) 60 MG 24 hr tablet Take 60 mg by mouth daily      metoprolol succinate ER (TOPROL XL) 25 MG 24 hr tablet Take 0.5 tablets (12.5 mg) by mouth daily  Qty: 15 tablet, Refills: 0    Associated Diagnoses: S/P CABG (coronary artery bypass graft)      Multiple Vitamins-Iron (ONE DAILY MULTIVITAMIN/IRON) TABS Take 1 tablet by mouth daily       mycophenolate (GENERIC EQUIVALENT) 250 MG capsule Take 3 capsules (750 mg) by mouth 2 times daily  Qty: 540 capsule, Refills: 3    Associated Diagnoses: Kidney transplanted      naltrexone (DEPADE/REVIA) 50 MG tablet Take 1 tablet (50 mg) by mouth daily Take 1/2 tablet (25 mg) daily for 1 week, then take 1 tablet (50 mg ) daily.  Qty: 30 tablet, Refills: 0    Associated Diagnoses: Alcohol use disorder, severe, dependence (H)      nitroGLYcerin (NITROSTAT) 0.4 MG sublingual tablet Place 1 tablet (0.4 mg) under the tongue every 5 minutes as needed for chest pain  Qty: 20 tablet, Refills: 3    Associated Diagnoses: Coronary arteriosclerosis due to lipid rich plaque      Omega-3 Fatty Acids (OMEGA 3 PO) Take 1 capsule by mouth daily Dose unknown      pantoprazole (PROTONIX) 40 MG EC tablet Take 1 tablet (40 mg) by mouth daily  Qty: 90 tablet, Refills: 3    Associated Diagnoses: Gastroesophageal reflux disease without esophagitis      polyethylene glycol (MIRALAX) 17 g packet Take 17 g by mouth daily as needed       predniSONE (DELTASONE) 5 MG tablet Take 1 tablet (5 mg) by mouth daily  Qty: 90 tablet, Refills: 3    Associated Diagnoses: Kidney transplanted      Rivaroxaban ANTICOAGULANT 15 & 20 MG TBPK Starter Therapy Pack Take 15 mg by mouth 2 times daily (with meals) for 21 days, THEN 20 mg daily with food for 9 days.  Qty: 51 each, Refills: 0    Associated Diagnoses: Multiple subsegmental pulmonary emboli without acute cor pulmonale (H)      rosuvastatin (CRESTOR) 20 MG tablet TAKE 1 TABLET(20 MG) BY MOUTH DAILY  Qty: 90 tablet, Refills: 3    Associated Diagnoses: Pure hypercholesterolemia      tacrolimus (PROTOPIC) 0.1 % external ointment Apply topically 2 times daily as needed      latanoprost (XALATAN) 0.005 % ophthalmic solution Place 1 drop into both eyes At Bedtime  Qty: 2.5 mL, Refills: 11    Associated Diagnoses: Borderline glaucoma with ocular hypertension, bilateral       !! - Potential duplicate  medications found. Please discuss with provider.                Rationale for medication changes:      See med rec        Consults       PHYSICAL THERAPY ADULT IP CONSULT  OCCUPATIONAL THERAPY ADULT IP CONSULT  CARE MANAGEMENT / SOCIAL WORK IP CONSULT  PHYSICAL THERAPY ADULT IP CONSULT  OCCUPATIONAL THERAPY ADULT IP CONSULT    Immunizations given this encounter     Most Recent Immunizations   Administered Date(s) Administered     COVID-19 Bivalent 12+ (Pfizer) 10/14/2022     COVID-19 Monovalent 18+ (Moderna) 09/02/2021     Flu, Unspecified 11/10/2010     HEPA 01/20/2016     HIB (PRP-T) 08/19/2022     Hepatitis A (ADULT 19+) 01/20/2016     Influenza (IIV3) PF 09/20/2017     Influenza Vaccine 18-64 (Flublok) 09/28/2021     Influenza Vaccine >6 months (Alfuria,Fluzone) 10/14/2022     Meningococcal ACWY (Menactra ) 08/19/2022     Meningococcal B (Bexsero ) 08/19/2022     Pneumo Conj 13-V (2010&after) 10/08/2014     Pneumococcal 23 valent 10/23/2018     TD,PF 7+ (Tenivac) 05/08/2019     TDAP (Adacel,Boostrix) 01/26/2009           Anticoagulation Information      Recent INR results: No results for input(s): INR in the last 168 hours.  Warfarin doses (if applicable) or name of other anticoagulant: XArelto      SIGNIFICANT IMAGING FINDINGS     Results for orders placed or performed during the hospital encounter of 08/30/23   XR Chest 2 Views    Impression    IMPRESSION: Sternotomy. Some slight atelectasis left lung base unchanged. No acute new findings.       SIGNIFICANT LABORATORY FINDINGS       Discharge Orders        General info for SNF    Length of Stay Estimate: Short Term Care: Estimated # of Days <30  Condition at Discharge: Stable  Level of care:skilled   Rehabilitation Potential: Good  Admission H&P remains valid and up-to-date: Yes  Recent Chemotherapy: N/A  Use Nursing Home Standing Orders: Yes     Follow Up and recommended labs and tests    Follow up with jail physician.  The following labs/tests are  recommended: CBC, BMP in 2 days.     Reason for your hospital stay    Weakness, SIRS, electrolyte abnormalities     Activity - Up ad muriel     Physical Therapy Adult Consult    Evaluate and treat as clinically indicated.    Reason:  Weakness     Occupational Therapy Adult Consult    Evaluate and treat as clinically indicated.    Reason:  Weakness     Fall precautions     Diet    Follow this diet upon discharge: Orders Placed This Encounter      Advance Diet as Tolerated: Regular Diet Adult       Examination   Physical Exam   Temp:  [97.9  F (36.6  C)-98.1  F (36.7  C)] 97.9  F (36.6  C)  Pulse:  [63-80] 80  Resp:  [18-20] 18  BP: (101-131)/(63-79) 131/78  SpO2:  [93 %-97 %] 97 %  Wt Readings from Last 1 Encounters:   08/30/23 81.6 kg (180 lb)       General: Pleasant male , in no acute distress  HEENT:EOMI, AT,NC  CVS:RRR, no edema  RS:CTAB  Abd: Soft, NT,ND  Neurology:Grossly normal   Psy:Approrpiate affect      Please see EMR for more detailed significant labs, imaging, consultant notes etc.    IBarbara MD, personally saw the patient today and spent greater than 30 minutes discharging this patient.    Barbara Phan MD  [facility]    CC:Aston Perry

## 2023-09-04 LAB
BACTERIA BLD CULT: NO GROWTH
BACTERIA BLD CULT: NO GROWTH

## 2023-09-05 ENCOUNTER — TELEPHONE (OUTPATIENT)
Dept: BEHAVIORAL HEALTH | Facility: CLINIC | Age: 63
End: 2023-09-05

## 2023-09-05 ENCOUNTER — VIRTUAL VISIT (OUTPATIENT)
Dept: ADDICTION MEDICINE | Facility: CLINIC | Age: 63
End: 2023-09-05
Payer: COMMERCIAL

## 2023-09-05 ENCOUNTER — MEDICAL CORRESPONDENCE (OUTPATIENT)
Dept: HEALTH INFORMATION MANAGEMENT | Facility: CLINIC | Age: 63
End: 2023-09-05

## 2023-09-05 DIAGNOSIS — F10.20 ALCOHOL USE DISORDER, SEVERE, DEPENDENCE (H): Primary | ICD-10-CM

## 2023-09-05 DIAGNOSIS — G47.00 INSOMNIA, UNSPECIFIED TYPE: ICD-10-CM

## 2023-09-05 PROCEDURE — 99215 OFFICE O/P EST HI 40 MIN: CPT | Mod: VID | Performed by: NURSE PRACTITIONER

## 2023-09-05 ASSESSMENT — PAIN SCALES - GENERAL: PAINLEVEL: NO PAIN (0)

## 2023-09-05 NOTE — PATIENT INSTRUCTIONS
Chemical health assessment schedule for 9/12/23 at 1030 am    Continue naltrexone 50 mg  [address] [facility] [address] [facility] [address]  [place] [address]  [phone]

## 2023-09-05 NOTE — PROGRESS NOTES
Virtual Visit Details    Type of service:  Video Visit   Video Start Time:  1153  Video End Time:12:26 PM    Originating Location (pt. Location): TCU    Distant Location (provider location):  On-site  Platform used for Video Visit: [employer] [facility]    A/P                                                    ASSESSMENT/PLAN  Diagnoses and all orders for this visit:  Alcohol use disorder, severe, dependence (H)  Reports cravings are controlled with naltrexone, denies alcohol use since 8/5. Is currently in a TCU following a 2 day observation stay due to weakness. It was recommended that he go to rehab facility then transfer to residential CD program. He still needs to complete a lashanda eval. He had to assessment scheduled but was hospitalized both times and unfortunately they were not completed while inpatient.  -Continue naltrexone 50 mg daily, TCU managing medications while he is at their facility. No refill needed today.   -LASHANDA eval scheduled for 9/12 at 1030. TCU to help arrange transportation.     2. Insomnia  Experiencing insomnia that he believes contributed to his symptoms of weakness. Discussed PAW symptoms and gabapentin may be effective. Plan to discuss his insomnia with TCU provider, will defer to TCU provider.     No orders of the defined types were placed in this encounter.      Problem list updated Sep 5, 2023   No problems updated.          PDMP Review         Value Time User    State PDMP site checked  Yes 9/5/2023  1:48 PM Sarahy Mullins CNP          07/07/2023 06/29/2023 2 Oxycodone Hcl (Ir) 5 Mg Tablet 28.00 2 Ri Mas [id number] Ali ([id number]) 0/0 105.00 MME Other [place]   06/29/2023 06/29/2023 2 Oxycodone Hcl (Ir) 5 Mg Tablet 30.00 2 Ri [facility] [id number] Ali ([id number])           RTC  Return in about 2 weeks (around 9/19/2023) for Follow up, with me, using a video visit 3 pm.      Counseled the patient on the importance of having a recovery program in addition to medication to manage  "recovery.  Components include avoiding isolating, having willingness to change, avoiding triggers and managing cravings. Encouraged having some type of sober network and practicing honesty with trusted support person(s). Encouraged other services such as counseling, 12 step or other self-help organizations.              SUBJECTIVE                                                    Jerad Ross is a 63 year old male with history of HTN, JULIANE, , CAD s/p CABG x3 9/2020, depression with anxiety, and alcohol use who presents for further evaluation of possible substance use disorder and management options.     Brief history: Initial visit 8/14/23. Jerad  is requesting a chemical health assessment to see if he \"should go to treatment of if AA is enough\". He reports that he began drinking 8 shots of whiskey 1-3 times weekly for past 4 months. States his depression and anxiety as well as opiate withdrawal triggered use escalating use. Has been prescribed opioid pain medication on/off since 3/2023 due to right hip pain 2/2 avascular necrosis which was revised 6/2023 and admits to taking more than prescribed on occasion and states that he realized he was taking them sometimes due to his emotions versus physical pain. He has not taken an opioid pain medication for past 3 weeks. States if he ever requires opioid pain medication in future, prefers to be tapered off.      He had a period of 4 years 7603-1374 of total abstinence from alcohol States he went to treatment for \"sexual compulsivity\" followed by a \"sober house\" and he just maintained abstinence after leaving sober house. States he went to sober house because he needed somewhere to go after leaving the [facility]. He is attending a 12 step group for his sexual compulsions. Has recently started attending AA as well.      Remote history of renal failure as an adolescent. Was on dialysis at age 16, and s/p kidney transplant at age 18. States he contracted hepatitis B from " "dialysis,      Substance Use History:   \"Have you ever had any history with [...] use?\" And \"When was your last use?  ALCOHOL - Drinks 8 shots of whiskey 1-3 times weekly for past 4 months, last drink 8/5/23.   CANNABIS - Denies  PRESCRIPTION STIMULANTS (includes Ritalin, Adderall, Vyvanse) - Denies  COCAINE/CRACK - Denies  METH/AMPHETAMINES (includes ecstacy, MDMA/connie) - Denies  OPIATES - Prescribed opiate pain medication on/off since 3/2023 due to right hip pain with revision 6/19/2023. States he took as prescribed mostly, but does admit to taking more than  prescribed on a few occasions. He needs to be tapered off of opioids in future as he did experience opiate w/d and began drinking to help with symptoms. No longer has prescription for opioids.   BENZODIAZEPINES (includes Ativan, Klonopin, Xanax) - Denies  KRATOM (mild opioid and stimulant effects) - Denies  KETAMINE - Denies  HALLUCINOGENS (includes DXM) - Denies  BEHAVIORAL (Gambling, Eating d/o, Compulsivity) - Sexual compulsivirty  History of treatment - Yes the [facility], \"sober house\", and 12 step  NICOTINE  Cigarettes: Denies           Previous withdrawal treatment episodes (e.g. detox): Denies  Previous ROBERT treatment programs: Denies  Hospitalizations or overdose: Denies  Medical complications from substance use: Hx of  renal failure on dialysis at age 16, and kidney transplant, infected with hepatitis B from dialysis. Alcohol impacts his liver.   IV Drug use?: Denies  Previous Medication for Addiction Tx: Denies  Longest period of full abstinence: Almost 4 years [id number]  Activities that have previously supported abstinence: Some AA  Current Recovery Activities: AA       Recent HPI Details: 8/14/23  1. Alcohol use disorder, severe, dependence (H)  63 year old male h/o of AUD, drinking 8 shots up to 3 times weekly who is seeking help to abstain from alcohol use. Is wanting a robert eval for treatment options   -Referral placed for robert eval and contact " information provided. Is interested in the  [facility] group for Medicare.   -Discussed pharmacotherapy today. Reports history of taking naltrexone in past to help with his sexual compulsions, and he is interested in trial for AUD. Start naltrexone, titrate to 50 mg TDD.  -Encouraged to continue to attend AA.  -Encouraged plans to start individual therapy later this month.   -Continue to follow up with psychiatry.   - Adult Mental Health  Referral; Future  - naltrexone (DEPADE/REVIA) 50 MG tablet; Take 1 tablet (50 mg) by mouth daily Take 1/2 tablet (25 mg) daily for 1 week, then take 1 tablet (50 mg ) daily.  Dispense: 30 tablet; Refill: 0     2. Opioid type dependence, episodic use (H)  Has been prescribed oxycodone 5-10 mg every 4 hours prn on and off since 3/2023. Is s/p right hip revision 6/2023 which required TCU stay. Has not had any opioid for past 3 weeks. Feels he definitely has physical dependence. He admits to taking more than prescribed or more to treat emotional pain than physical pain in past.   -Encouraged to let future provider prescribing opioids that he prefers to be taper off in future due to experiencing opiate withdrawal which triggered his alcohol use.     Interim history  Hospitalized 8/20-8/22 for SOB and weakness, found to have a PE. Started on Xarelto.   Hospitalized 8/30-9/1 for general weakness and inability to care for self. Discharged to TCU for rehab 2/2 deconditioning.     TODAY'S VISIT  HPI Sep 5, 2023    -Hospitalized 8/30-9/1 for general weakness and SOB now at a TCU [facility] for rehab for strengthening.   -Started the naltrexone 50 mg daily tolerating well. Last drink 8/5/23.   -Attending physician recommended that he go to rehab then directly to inpatient treatment.   -Would like to go to inpatient treatment. Has had lashanda evals scheduled, but has missed due to being hospitalized. Assessments were scheduled 8/22 and 8/30.   -Would like to do the assessment as  soon as possible. TCU is able to assist him with arranging transportation.   -Reporting difficulty sleeping which he believe contributed to his weakness leading to hospitalization. Also reports that he never fully recovered after his last hips replacement.           OBJECTIVE                                                    PHYSICAL EXAM:  There were no vitals taken for this visit.    GENERAL: healthy, alert and no distress  EYES: Eyes grossly normal to inspection, PERRL and conjunctivae and sclerae normal  RESP: No respiratory distress  MENTAL STATUS EXAM  Appearance/Behavior: No appearant distress  Speech: Normal  Mood/Affect: normal affect  Insight: Adequate    LAB  No results found for any visits on 09/05/23.      HISTORY                                                    Problem list reviewed & adjusted, as indicated.  Patient Active Problem List   Diagnosis    BCC (basal cell carcinoma), trunk    JULIANE (obstructive sleep apnea)    HTN, kidney transplant related    Coronary arteriosclerosis due to lipid rich plaque    NSTEMI (non-ST elevated myocardial infarction) (H)    Depression    Diverticulosis    Hemorrhoids    Kidney replaced by transplant    Basal cell carcinoma    Squamous cell carcinoma    Dyslipidemia    CRP elevated    Glaucoma    AION (acute ischemic optic neuropathy)    Paracentral scotoma    Hip pain    Inflamed seborrheic keratosis    Intertrigo    Chronic hepatitis B (H)    Immunosuppression (H)    Hypogonadism in male    GERD (gastroesophageal reflux disease)    Aftercare following organ transplant    Acute midline low back pain without sciatica    Septic bursitis    Impaired mobility    CAD -- S/P CABG x 3 in 2020    Abnormal cardiovascular stress test    HTN (hypertension)    End stage renal disease (H)    Hip pain, right    Avascular necrosis of bones of both hips (H)    Chest pain, Probably chest wall in origin    Preoperative examination    Coronary artery disease of native artery of  native heart with stable angina pectoris (H)    Failed total hip arthroplasty, sequela    Generalized muscle weakness    Generalized anxiety disorder    Transient hypotension    Hypokalemia    Alcohol use disorder, severe, dependence (H)    Hypoxia    Multiple subsegmental pulmonary emboli without acute cor pulmonale (H)    Hypomagnesemia    General weakness    Unable to ambulate    Nonadherence to medication         MEDICATION LIST (prior to visit)  acetaminophen (TYLENOL) 500 MG tablet, Take 1,000 mg by mouth 3 times daily as needed for mild pain  albuterol (PROAIR HFA) 108 (90 Base) MCG/ACT inhaler, Inhale 2 puffs into the lungs every 6 hours as needed for shortness of breath / dyspnea or wheezing  aspirin 81 MG EC tablet, Take 1 tablet (81 mg) by mouth daily  budesonide (PULMICORT FLEXHALER) 90 MCG/ACT inhaler, Inhale 2 puffs into the lungs 2 times daily as needed (shortness of breath)  entecavir (BARACLUDE) 0.5 MG tablet, Take 1 tablet (0.5 mg) by mouth daily  FLUoxetine (PROZAC) 20 MG capsule, Take 1 capsule (20 mg) by mouth daily With 40mg for a total daily dose of 60mg  FLUoxetine (PROZAC) 40 MG capsule, Take 1 capsule (40 mg) by mouth daily  fluticasone-salmeterol (ADVAIR) 250-50 MCG/ACT inhaler, Inhale 1 puff into the lungs 2 times daily  furosemide (LASIX) 20 MG tablet, Take 1 tablet (20 mg) by mouth daily as needed (fluid retention)  hydrOXYzine (ATARAX) 10 MG tablet, Take 1 tablet (10 mg) by mouth every 8 hours as needed for anxiety  isosorbide mononitrate (IMDUR) 60 MG 24 hr tablet, Take 60 mg by mouth daily  latanoprost (XALATAN) 0.005 % ophthalmic solution, Place 1 drop into both eyes At Bedtime  metoprolol succinate ER (TOPROL XL) 25 MG 24 hr tablet, Take 0.5 tablets (12.5 mg) by mouth daily  Multiple Vitamins-Iron (ONE DAILY MULTIVITAMIN/IRON) TABS, Take 1 tablet by mouth daily  mycophenolate (GENERIC EQUIVALENT) 250 MG capsule, Take 3 capsules (750 mg) by mouth 2 times daily  naltrexone  (DEPADE/REVIA) 50 MG tablet, Take 1 tablet (50 mg) by mouth daily Take 1/2 tablet (25 mg) daily for 1 week, then take 1 tablet (50 mg ) daily.  nitroGLYcerin (NITROSTAT) 0.4 MG sublingual tablet, Place 1 tablet (0.4 mg) under the tongue every 5 minutes as needed for chest pain  Omega-3 Fatty Acids (OMEGA 3 PO), Take 1 capsule by mouth daily Dose unknown  pantoprazole (PROTONIX) 40 MG EC tablet, Take 1 tablet (40 mg) by mouth daily  polyethylene glycol (MIRALAX) 17 g packet, Take 17 g by mouth daily as needed   predniSONE (DELTASONE) 5 MG tablet, Take 1 tablet (5 mg) by mouth daily  Rivaroxaban ANTICOAGULANT 15 & 20 MG TBPK Starter Therapy Pack, Take 15 mg by mouth 2 times daily (with meals) for 21 days, THEN 20 mg daily with food for 9 days.  rosuvastatin (CRESTOR) 20 MG tablet, TAKE 1 TABLET(20 MG) BY MOUTH DAILY  tacrolimus (PROTOPIC) 0.1 % external ointment, Apply topically 2 times daily as needed    No current facility-administered medications on file prior to visit.      MEDICATION LIST (after visit)  Current Outpatient Medications   Medication    acetaminophen (TYLENOL) 500 MG tablet    albuterol (PROAIR HFA) 108 (90 Base) MCG/ACT inhaler    aspirin 81 MG EC tablet    budesonide (PULMICORT FLEXHALER) 90 MCG/ACT inhaler    entecavir (BARACLUDE) 0.5 MG tablet    FLUoxetine (PROZAC) 20 MG capsule    FLUoxetine (PROZAC) 40 MG capsule    fluticasone-salmeterol (ADVAIR) 250-50 MCG/ACT inhaler    furosemide (LASIX) 20 MG tablet    hydrOXYzine (ATARAX) 10 MG tablet    isosorbide mononitrate (IMDUR) 60 MG 24 hr tablet    latanoprost (XALATAN) 0.005 % ophthalmic solution    metoprolol succinate ER (TOPROL XL) 25 MG 24 hr tablet    Multiple Vitamins-Iron (ONE DAILY MULTIVITAMIN/IRON) TABS    mycophenolate (GENERIC EQUIVALENT) 250 MG capsule    naltrexone (DEPADE/REVIA) 50 MG tablet    nitroGLYcerin (NITROSTAT) 0.4 MG sublingual tablet    Omega-3 Fatty Acids (OMEGA 3 PO)    pantoprazole (PROTONIX) 40 MG EC tablet     polyethylene glycol (MIRALAX) 17 g packet    predniSONE (DELTASONE) 5 MG tablet    Rivaroxaban ANTICOAGULANT 15 & 20 MG TBPK Starter Therapy Pack    rosuvastatin (CRESTOR) 20 MG tablet    tacrolimus (PROTOPIC) 0.1 % external ointment     No current facility-administered medications for this visit.         Allergies   Allergen Reactions    Penicillins Shortness Of Breath and Hives    Keflex [Cephalexin Hcl] Unknown     Patient could not recall reaction    Sulfa Antibiotics Rash    Tetracycline Unknown     Patient could not recall reaction       At least 45 min spent on day of encounter in review of medical record,  review, obtaining histories, discussing recommendations, counseling/coordination of care    Sarahy Mullins, TABITHA,MSN, AGNP-C  [facility]   [address], [address]   [place] [address]   Phone # [phone]

## 2023-09-05 NOTE — TELEPHONE ENCOUNTER
Pt is a(n) adult (18+ out of HS) Seeking as eval for Adult ROBERT Assessment (no programming)..  Appointment scheduled by:  Patient.  (self-pay - complete Cost Estimate)  Caller name:  Jerad via Sarahy Mullins    Caller phone #: [phone]   Brief reason for appt:  Request from Sarahy Mullins     needed?  NO    Contact information verified/updated: Yes

## 2023-09-05 NOTE — NURSING NOTE
Is the patient currently in the state of [place]? YES    Visit mode:VIDEO    If the visit is dropped, the patient can be reconnected by: VIDEO VISIT: Text to cell phone:   Telephone Information:   Mobile [phone]    and VIDEO VISIT: Send to e-mail at: [email or web address]    Will anyone else be joining the visit? NO  (If patient encounters technical issues they should call [phone]    How would you like to obtain your AVS? MyChart    Are changes needed to the allergy or medication list? No    Reason for visit: RECHECK    Velma LUNA

## 2023-09-07 NOTE — TELEPHONE ENCOUNTER
9/7/2023    Left message with date and time of appt, requesting call back with my number.     [facility] - Patient Navigator   [email or web address]  Direct phone: [phone]

## 2023-09-12 ENCOUNTER — HOSPITAL ENCOUNTER (OUTPATIENT)
Dept: BEHAVIORAL HEALTH | Facility: CLINIC | Age: 63
Discharge: HOME | End: 2023-09-12
Attending: NURSE PRACTITIONER | Admitting: NURSE PRACTITIONER
Payer: COMMERCIAL

## 2023-09-12 VITALS — BODY MASS INDEX: 27.47 KG/M2 | HEIGHT: 67 IN | WEIGHT: 175 LBS

## 2023-09-12 DIAGNOSIS — F10.20 ALCOHOL USE DISORDER, SEVERE, DEPENDENCE (H): ICD-10-CM

## 2023-09-12 PROCEDURE — H0001 ALCOHOL AND/OR DRUG ASSESS: HCPCS

## 2023-09-12 ASSESSMENT — COLUMBIA-SUICIDE SEVERITY RATING SCALE - C-SSRS
ATTEMPT LIFETIME: YES
2. HAVE YOU ACTUALLY HAD ANY THOUGHTS OF KILLING YOURSELF?: NO
REASONS FOR IDEATION LIFETIME: MOSTLY TO END OR STOP THE PAIN (YOU COULDN'T GO ON LIVING WITH THE PAIN OR HOW YOU WERE FEELING)
2. HAVE YOU ACTUALLY HAD ANY THOUGHTS OF KILLING YOURSELF?: YES
LETHALITY/MEDICAL DAMAGE CODE MOST RECENT ACTUAL ATTEMPT: NO PHYSICAL DAMAGE OR VERY MINOR PHYSICAL DAMAGE
TOTAL  NUMBER OF ACTUAL ATTEMPTS LIFETIME: 1
6. HAVE YOU EVER DONE ANYTHING, STARTED TO DO ANYTHING, OR PREPARED TO DO ANYTHING TO END YOUR LIFE?: NO
MOST LETHAL DATE: 50039
4. HAVE YOU HAD THESE THOUGHTS AND HAD SOME INTENTION OF ACTING ON THEM?: YES
TOTAL  NUMBER OF INTERRUPTED ATTEMPTS LIFETIME: NO
4. HAVE YOU HAD THESE THOUGHTS AND HAD SOME INTENTION OF ACTING ON THEM?: NO
TOTAL  NUMBER OF ABORTED OR SELF INTERRUPTED ATTEMPTS LIFETIME: NO
LETHALITY/MEDICAL DAMAGE CODE MOST LETHAL ACTUAL ATTEMPT: NO PHYSICAL DAMAGE OR VERY MINOR PHYSICAL DAMAGE
MOST RECENT DATE: 50039
5. HAVE YOU STARTED TO WORK OUT OR WORKED OUT THE DETAILS OF HOW TO KILL YOURSELF? DO YOU INTEND TO CARRY OUT THIS PLAN?: NO
LETHALITY/MEDICAL DAMAGE CODE MOST LETHAL POTENTIAL ATTEMPT: BEHAVIOR LIKELY TO RESULT IN INJURY BUT NOT LIKELY TO CAUSE DEATH
1. HAVE YOU WISHED YOU WERE DEAD OR WISHED YOU COULD GO TO SLEEP AND NOT WAKE UP?: YES
ATTEMPT PAST THREE MONTHS: NO
2. HAVE YOU ACTUALLY HAD ANY THOUGHTS OF KILLING YOURSELF?: SEE ABOVE
1. IN THE PAST MONTH, HAVE YOU WISHED YOU WERE DEAD OR WISHED YOU COULD GO TO SLEEP AND NOT WAKE UP?: NO
3. HAVE YOU BEEN THINKING ABOUT HOW YOU MIGHT KILL YOURSELF?: YES
LETHALITY/MEDICAL DAMAGE CODE FIRST POTENTIAL ATTEMPT: BEHAVIOR LIKELY TO RESULT IN INJURY BUT NOT LIKELY TO CAUSE DEATH
LETHALITY/MEDICAL DAMAGE CODE MOST RECENT POTENTIAL ATTEMPT: BEHAVIOR LIKELY TO RESULT IN INJURY BUT NOT LIKELY TO CAUSE DEATH
LETHALITY/MEDICAL DAMAGE CODE FIRST ACTUAL ATTEMPT: NO PHYSICAL DAMAGE OR VERY MINOR PHYSICAL DAMAGE
5. HAVE YOU STARTED TO WORK OUT OR WORKED OUT THE DETAILS OF HOW TO KILL YOURSELF? DO YOU INTEND TO CARRY OUT THIS PLAN?: YES
FIRST ATTEMPT DATE: 50039

## 2023-09-12 ASSESSMENT — ANXIETY QUESTIONNAIRES
GAD7 TOTAL SCORE: 9
GAD7 TOTAL SCORE: 9
7. FEELING AFRAID AS IF SOMETHING AWFUL MIGHT HAPPEN: SEVERAL DAYS
5. BEING SO RESTLESS THAT IT IS HARD TO SIT STILL: SEVERAL DAYS
3. WORRYING TOO MUCH ABOUT DIFFERENT THINGS: SEVERAL DAYS
2. NOT BEING ABLE TO STOP OR CONTROL WORRYING: SEVERAL DAYS
4. TROUBLE RELAXING: MORE THAN HALF THE DAYS
6. BECOMING EASILY ANNOYED OR IRRITABLE: SEVERAL DAYS
1. FEELING NERVOUS, ANXIOUS, OR ON EDGE: MORE THAN HALF THE DAYS

## 2023-09-12 ASSESSMENT — PAIN SCALES - GENERAL: PAINLEVEL: MODERATE PAIN (4)

## 2023-09-12 ASSESSMENT — PATIENT HEALTH QUESTIONNAIRE - PHQ9: SUM OF ALL RESPONSES TO PHQ QUESTIONS 1-9: 15

## 2023-09-12 NOTE — TELEPHONE ENCOUNTER
This patient was a no show to his 10:30 am substance use disorder assessment appointment today on 9/12/2023.  The patient had not arrived to [facility] for the appointment as of 10:55 am.  The patient should be directed to call the [facility] Intake department at [phone] to re-schedule the substance use disorder assessment as needed.

## 2023-09-12 NOTE — PROGRESS NOTES
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     s/p bilateral hip replacements    Avascular necrosis of bones of both hips (H)     s/p bilateral hip replacements    Basal cell carcinoma     Chronic hepatitis B (H)     Clostridium difficile colitis     COPD (chronic obstructive pulmonary disease) (H)     Coronary artery disease involving native coronary artery of native heart without angina pectoris 06/17/2014    Coronary angiogram 6/17/14: Severe distal 3-vessel disease involving small vessels, not amenable to PCI or CABG.    CRP elevated     Depression     Depressive disorder     Diverticulosis     Dyslipidemia     Elevated C-reactive protein (CRP)     FSGS (focal segmental glomerulosclerosis)     FSGS (focal segmental glomerulosclerosis)     Gastric ulcer with hemorrhage 02/12/2012    Gastric ulcer with hemorrhage 02/12/2012    GERD (gastroesophageal reflux disease)     Glaucoma     OHTN    Glaucoma     Hemorrhoids     Hemorrhoids     History of blood transfusion     HTN (hypertension)     Hyperlipidemia     Hypertension secondary to other renal disorders     Hypogonadism in male     Immunosuppressed status (H)     Kidney replaced by  transplant     focal glomerulosclerosis     Kidney transplanted     focal glomerulosclerosis     NSTEMI (non-ST elevated myocardial infarction) (H) 06/17/2014    NSTEMI (non-ST elevated myocardial infarction) (H) 06/17/2014    JULIANE (obstructive sleep apnea)     Doesn't use CPAP    Paracentral scotoma     LE    Secondary renal hyperparathyroidism (H)     Secondary renal hyperparathyroidism (H)     Septic bursitis     Squamous cell carcinoma     Uncomplicated asthma       Substance Use:  The patient reported the following biological family members or relatives with chemical health issues: family history includes Substance Abuse in his maternal grandfather.  The patient denied having any history of participating in a substance use disorder treatment program.  The patient denied having any inpatient detoxification admissions or inpatient hospitalizations for withdrawal symptoms.  The patient is currently receiving the following services: The patient is currently working with medical providers and is receiving Medication Assisted Therapy with prescribed Naltrexone.  The patient reported currently attending 12-step support group meetings for SA.        Substance Age of first use Pattern and duration of use (include amounts and frequency) Date of last use     Withdrawal potential Route of use   Has used Alcohol 17 The patient reported his longest period of time without drinking alcohol had been from 2019 until relapsing with alcohol around 4-months ago and his return to drinking alcohol was due to having a hip surgery and being prescribed Oxycodone which he had difficulty detoxifying off of after it had stopped being prescribed to him and because he had been occasionally using more Oxycodone than had been prescribed.    The patient reported his heaviest use of alcohol had been during the past 4-months, when he reported a pattern of drinking 8-10 shots of hard alcohol 3 times per week on average.    The patient reported  having some memory impairment due to his use of alcohol during the past 4-months.   8/5/2023    8-10 shots of hard alcohol No Oral   Has not used Marijuana   16 The patient reported his heaviest use of THC/cannabis had been during his teenage years and his early 20's, when he reported a pattern of smoking 1 joint of THC/cannabis between 2-3 times per month.    During the past 12-months, he reported orally using THC/cannabis gummies on less than 10 occasions.     1+ month ago No Oral   Has not used Amphetamines          Has not used Cocaine/crack           Has not used Hallucinogens        Has not used Inhalants        Has not used Heroin        Has used Other Opiates 18 The patient reported being off and on of his prescribed opioid pain medications since 3/2023 due to having hip pain.  The patient reported he had used more of his prescribed opioid pain medications between 2-3 times per week and he had consumed alcohol in an attempt to self-medicate his withdrawal symptoms off of opioid pain medications.   6-weeks ago No Oral   Has not used Benzodiazepines          Has not used Barbiturates        Has not used Over the counter medications        Has not used Nicotine        Has use Caffeine 18 The patient reported a current pattern of drinking 2 cups of coffee on a daily basis.    9/12/2023  No  Oral   Has not used other substances not listed above:  Identify:           The patient reported the following problems as a result of their substance use: relationship problems, family problems, chronic health problems which were exacerbated by his use of alcohol, and mental health symptoms which were exacerbated by his use of alcohol.  The patient is concerned about substance use.  The patient reported his recovery goal is: The patient's plan and goal is to abstain from alcohol and from all other non-prescribed mood altering chemicals.     The patient reports experiencing the following withdrawal symptoms within the past 12  months: sweating, shaky/jittery/tremors, unable to sleep, agitation, headache, fatigue, sad/depressed feeling, irritability, sensitivity to noise, nausea/vomiting, dizziness, diminished appetite, and anxiety/worry and the following within the past 30 days: sweating, shaky/jittery/tremors, unable to sleep, agitation, headache, fatigue, sad/depressed feeling, irritability, sensitivity to noise, nausea/vomiting, dizziness, diminished appetite, and anxiety/worry. (DSM-11)  The patient reported having urges to drink alcohol.  (DSM-4)  The patient reported he has used more alcohol than intended and over a longer period of time than intended.  (DSM-1)  The patient reported he has had unsuccessful attempts to cut down or control use of alcohol.  (DSM-2)  The patient reported his longest period of time without drinking alcohol had been from 2019 until relapsing with alcohol around 4-months ago and his return to drinking alcohol was due to having a hip surgery and being prescribed Oxycodone which he had difficulty detoxifying off of after it had stopped being prescribed to him and because he had been occasionally using more Oxycodone than had been prescribed.  The patient reported he has needed to use more alcohol to achieve the same effect.  (DSM-10)  The patient does report diminished effect with use of same amount of alcohol.  (DSM-10)     The patient does not report a great deal of time is spent in activities necessary to obtain, use, or recover from alcohol effects.  (DSM-3)  The patient does report important social, occupational, or recreational activities are given up or reduced because of alcohol use.  (DSM-7)  Alcohol use is continued despite knowledge of having a persistent or recurrent physical or psychological problem that is likely to have been caused or exacerbated by use.  (DSM-9)  The patient reported the following problem behaviors while under the influence of substances: The patient reported having  relationship conflict with his sister and a few friends, being more socially isolated, and having some memory impairment when under the influence of alcohol.  (DSM-6)  The patient denied having any recurrent use of alcohol in physically hazardous situations.  (DSM-8)    The patient reported his substance use has not negatively impacted his ability to function in a school setting within the past 12-months.  (DSM-5)  The patient reported his substance use has not negatively impacted his ability to function in a work setting within the past 12-months.  (DSM-5)  The patient's demographics and history impact his recovery in the following ways: family history includes Anxiety Disorder in his maternal grandmother and Substance Abuse in his maternal grandfather. The patient reported engaging in the following recreation/leisure activities while using alcohol or other non-prescribed mood altering chemicals: The patient's use of alcohol had been done independently of his social/recreational/leisure activities.  The patient reported the following people are supportive of his recovery: his brother, his sister, and his SA sponsor.    The patient denied having current or past concerns regarding gambling and denied ever participating in a gambling treatment program.  The patient does have other addictive behaviors he is concerned about at this time.  The patient reported having a history of sexual compulsivity for pornography on the computer and he reported he is currently in a 12-step program for his sexual compulsivity.  The patient denied having any history of sex crimes and he denied ever being a registered sex offender.    Dimension Scale Ratings:    Dimension 1 -  Acute Intoxication/Withdrawal: 0 - No Problem    Dimension 2 - Biomedical: 2 - Moderate Problem    Dimension 3 - Emotional/Behavioral/Cognitive Conditions: 2 - Moderate Problem    Dimension 4 - Readiness to Change:  3 - Severe Problem    Dimension 5 -  Relapse/Continued Use/ Continued Problem Potential: 4 - Extreme Problem    Dimension 6 - Recovery Environment:  3 - Severe Problem    Significant Losses / Trauma / Abuse / Neglect Issues:   The patient did not serve in the .  There are indications or report of significant loss, trauma, abuse or neglect issues related to: The patient denied having any history of being verbally, emotionally, physically, or sexually abused.  The patient reported having a history of trauma issues due to being diagnosed with FTS when he was 10 years old and due to having chronic medical problems for most of his life.  The patient reported having a history of 1 suicide attempt at age 18, when he had attempted to overdose on his prescription medications at that time.  The patient did not require any medical treatment following that suicide attempt.  The patient denied having any current suicide ideation.  The patient denied having any history of self-injurious behavior.   Concerns for possible neglect are not present.    Safety Assessment:   The patient denies current homicidal ideation and behaviors.  The patient denies current self-injurious ideation and behaviors.    The patient reported having health problems and reported having memory impairment associated with substance use.  The patient reported substance use associated with mental health symptoms.  The patient reported the following current concerns for their personal safety: None.  The patient reported there are not any firearms in the home.     History of Safety Concerns:  The patient denied a history of homicidal ideation.     The patient denied a history of personal safety concerns.    The patient denied a history of assaultive behaviors.    The patient denied having any history of sexual assault behaviors.  The patient denied having any history of being registered as a sex offender.  The patient reported a history of having health problems and reported a history of  having memory impairment associated with substance use.  The patient reported a history of substance use associated with mental health symptoms.  The patient reported the following protective factors: positive relationships positive family connections, forward/future oriented thinking, adherence with prescribed medication, and living with other people.    Risk Plan:  See Recommendations for Safety and Risk Management Plan    Review of Symptoms per patient report:  Substance Use:  some memory impairment due to substance use, significant withdrawal symptoms, substance use related medical issues, substance use related mental health issues, cravings/urges to use, family relationship problems due to substance use, and social problems related to substance use.    Diagnostic Criteria:   1.)  Substance is often taken in larger amounts or over a longer period than was intended.  Met for:  alcohol.  2.)  There is persistent desire or unsuccessful efforts to cut down or control use of the substance.  Met for:  alcohol.  4.)  Craving, or a strong desire or urge to use the substance.  Met for:  alcohol.  6.)  Continued use of the substance despite having persistent or recurrent social or interpersonal problems caused or exacerbated by the effects of its use.  Met for:  alcohol.  7.)  Important social, occupational, or recreational activities are given up or reduced because of the substance.  Met for:  alcohol.  9.)  Use of the substance is continued despite knowledge of having a persistent or recurrent physical or psychological problem that is likely to have been cause or exacerbated by the substance.  Met for:  alcohol.  10.)  Tolerance:  either a need for markedly increased amounts of the substance to achieve the desired effect or a markedly diminished effect with continued use of the same amount of the substance.  Met for:  alcohol.  11.)  Withdrawal:  either patient endorses characteristic withdrawal syndrome for the  substance or the substance (or closely related substance) is taken to relieve or avoid withdrawal symptoms.  Met for:  alcohol.    Collateral Contact Summary:   Collateral contacts contributing to this assessment:  The patient's electronic medical records were reviewed at time of assessment.    No additional collateral data had been obtained at the time of this documentation.     If court related records were reviewed, summarize here: None    Information from collateral contacts supported/largely agreed with information from the client and associated risk ratings.    Information in this assessment was obtained from the medical record and provided by the patient who is a good historian.        The patient will have open access to his substance use disorder assessment medical record.    As evidenced by self report and criteria, the patient meets the following DSM-5 Diagnoses: (Sustained by DSM-5 Criteria Listed Above)      1.)  Alcohol Use Disorder Severe - 303.90 (F10.20)  2.)  Depression NOS, per patient self-report  3.)  KIM, per patient self-report    Specify if: In early remission:  After full criteria for alcohol/drug use disorder were previously met, none of the criteria for alcohol/drug use disorder have been met for at least 3 months but for less than 12 months (with the exception that Criterion A4,  Craving or a strong desire or urge to use alcohol/drug  may be met).     In sustained remission:   After full criteria for alcohol use disorder were previously met, none of the criteria for alcohol/drug use disorder have been met at any time during a period of 12 months or longer (with the exception that Criterion A4,  Craving or strong desire or urge to use alcohol/drug  may be met).     Specify if:   This additional specifier is used if the individual is in an environment where access to alcohol is restricted.    Mild: Presence of 2-3 symptoms  Moderate: Presence of 4-5 symptoms  Severe: Presence of 6 or more  symptoms    Recommendations:     1. Plan for Safety and Risk Management:     Recommended that patient call 911 or go to the local ED should there be a change in any of these risk factors..            Report to child / adult protection services was not needed.    2. ROBERT Referrals:      Recommendations:      1.)  It was recommended the patient abstain from alcohol and from all other non-prescribed mood altering chemicals.   2.)  Have a mental health evaluation to address his current clinical mental health issues while on the inpatient substance use disorder treatment program unit.  3.)  Follow all of the recommendations of his medical and mental health providers.  4.)  Enter the inpatient treatment program at [facility] in [place], [place] or a similar inpatient treatment program for substance use disorder treatment.  5.)  Follow all of the recommendations of his substance use disorder treatment providers including entering an extended care program as needed.     The patient reported he was willing to follow the above recommendations.      The patient would like the following family or other support people involved in their treatment:  None at this time.  The patient does not have any history of opioid abuse.      3.  Mental Health Referrals:     The patient would benefit from having a mental health evaluation to address his current mental health issues while on the inpatient substance use disorder assessment treatment program unit.    4. Clinical Substantiation for the above recommendations: The patient has been unable to maintain abstinence from alcohol while living at his current home environment, lacks long-term sober maintenance skills, lacks sober coping skills, lacks awareness regarding the disease model of addiction, lacks a sober peer support network, has dual issues of mental health and substance abuse, has medical issues which are exacerbated by substance abuse, and has mental  health symptoms which are exacerbated by substance abuse.    5.  Cultural Concerns:    The patient did not identify having any cultural concerns regarding mental health, physical health, or substance use issues.     6. Recommendations for treatment focus:      Alcohol / Substance Use - See #2. ROBERT Referrals above for details on recommendations.    7. Interactive Complexity: No    Provider Name/ Credentials:  JAMIE Bennett  September 12, 2023

## 2023-09-13 DIAGNOSIS — E87.6 HYPOKALEMIA: ICD-10-CM

## 2023-09-13 NOTE — TELEPHONE ENCOUNTER
Summary of Patient Care Communication Handoff to Patient Navigator Coordinator    PATIENT'S NAME: Jerad Ross  PREFERRED PRONOUNS: He/Him/His/Himself  MRN:   [id number]  :   1960    DATE OF SERVICE: 23    Client Population Served: Adult Substance Use Disorder (ROBERT)    Diagnostic Assessment/Comprehensive Assessment was completed:  Yes    Level of Care Recommendations:   1.)  It was recommended the patient abstain from alcohol and from all other non-prescribed mood altering chemicals.   2.)  Have a mental health evaluation to address his current clinical mental health issues while on the inpatient substance use disorder treatment program unit.  3.)  Follow all of the recommendations of his medical and mental health providers.  4.)  Enter the inpatient treatment program at [facility] in [place], [place] or a similar inpatient treatment program for substance use disorder treatment.  5.)  Follow all of the recommendations of his substance use disorder treatment providers including entering an extended care program as needed.      The patient reported he was willing to follow the above recommendations.      Patient would benefit from a Specialty Care Coordinator Referral for:  No    Release of Information Needed:  No additional release of informations are needed at this time.    Follow up Requests:  Follow up on patient entering ROBERT treatment    Comments: None    Sandro Lorenzo [facility] [place]    Patient Navigator Coordinator Contact Information  Pool Message: [email or web address] ([phone])   Phone:  [phone]  Fax:  [phone]  Email:  [email or web address]

## 2023-09-13 NOTE — TELEPHONE ENCOUNTER
This patient's ROBERT Assessment DAANES information was entered directly into the [place] Department of Human [employer] DAANES website on 9/12/2023.  Assessment ID: [id number]

## 2023-09-13 NOTE — TELEPHONE ENCOUNTER
This patient's 9/12/2023 substance use disorder assessment was faxed to [facility] in [place], [place] on 9/12/2023 as a referral for inpatient substance use disorder treatment.

## 2023-09-15 DIAGNOSIS — F33.41 RECURRENT MAJOR DEPRESSIVE DISORDER, IN PARTIAL REMISSION (H): ICD-10-CM

## 2023-09-15 RX ORDER — FLUOXETINE 40 MG/1
40 CAPSULE ORAL DAILY
Qty: 90 CAPSULE | Refills: 2 | OUTPATIENT
Start: 2023-09-15

## 2023-09-15 NOTE — TELEPHONE ENCOUNTER
"Medication requested:     FLUoxetine (PROZAC) 40 MG capsule     Last refilled: 1/6/23  Qty: 90/2      Last seen: 8/28/23 \" he chooses to continue Prozac 60mg daily, hydroxyzine HCL 10mg TID PRN (has both, needs at RTC) \"  RTC: 9/28/23  Cancel: 0  No-show: 0  Next appt: 9/28/23    Refill decision:   REFILLS ADDRESSED AT RTC 9/28    "

## 2023-09-18 NOTE — TELEPHONE ENCOUNTER
9/18/2023    Pt returned my call, said he spoke with [facility] this morning. Discussed their recommendations, which do not take Medicare. I told pt that the other option for him would be [place], he said he wants to think about it. Pt asked about [place] outpatient programs, explained to pt which programs take Medicare. Pt said he was going to think about it and talk with the provider about maybe doing outpatient. Told pt to call me with whatever he decides, that I can make referrals for him. Pt thanked me for the call.     Mitra Salinas [facility] - Patient Navigator   [email or web address]  Direct phone: [phone]

## 2023-09-18 NOTE — TELEPHONE ENCOUNTER
POTASSIUM CL 20MEQ ER TABLETS     Last Office Visit: 11/1/22  Future Office visit:   10/3/23    Routing refill request to provider for review/approval because:  Drug not active on patient's medication list    Ashley Orellana RN  '

## 2023-09-18 NOTE — TELEPHONE ENCOUNTER
9/18/2023    Called [facility] and spoke with Desire - she reports they declined pt for their program and the supervisor suggested [place] or [facility].   Will connect with the pt as [place] is detox only and [facility] does not take pt's insurance. Pt's only other option would be [facility].     I called pt as well. Left VM asking pt if he wanted further referrals, as he did not answer. Left my number for a return call.     Mitra Salinas [facility] [place] - Patient Navigator   [email or web address]  Direct phone: [phone]

## 2023-09-19 ENCOUNTER — TELEPHONE (OUTPATIENT)
Dept: ADDICTION MEDICINE | Facility: CLINIC | Age: 63
End: 2023-09-19

## 2023-09-19 ENCOUNTER — VIRTUAL VISIT (OUTPATIENT)
Dept: ADDICTION MEDICINE | Facility: CLINIC | Age: 63
End: 2023-09-19
Payer: COMMERCIAL

## 2023-09-19 DIAGNOSIS — G47.00 INSOMNIA, UNSPECIFIED TYPE: ICD-10-CM

## 2023-09-19 DIAGNOSIS — F10.20 ALCOHOL USE DISORDER, SEVERE, DEPENDENCE (H): Primary | ICD-10-CM

## 2023-09-19 DIAGNOSIS — F41.1 GENERALIZED ANXIETY DISORDER: Chronic | ICD-10-CM

## 2023-09-19 PROCEDURE — 99214 OFFICE O/P EST MOD 30 MIN: CPT | Mod: VID | Performed by: NURSE PRACTITIONER

## 2023-09-19 RX ORDER — HYDROXYZINE HYDROCHLORIDE 25 MG/1
25 TABLET, FILM COATED ORAL
Qty: 30 TABLET | Refills: 0 | Status: SHIPPED | OUTPATIENT
Start: 2023-09-19 | End: 2023-09-28

## 2023-09-19 RX ORDER — NALTREXONE HYDROCHLORIDE 50 MG/1
50 TABLET, FILM COATED ORAL DAILY
Qty: 30 TABLET | Refills: 0 | Status: SHIPPED | OUTPATIENT
Start: 2023-09-19 | End: 2023-10-10

## 2023-09-19 NOTE — PROGRESS NOTES
Virtual Visit Details    Type of service:  Video Visit   Video Start Time:  1502  Video End Time:3:25 PM    Originating Location (pt. Location): TCU    Distant Location (provider location):  On-site  Platform used for Video Visit: hdtMEDIA [facility]    A/P                                                    ASSESSMENT/PLAN  Diagnoses and all orders for this visit:  Alcohol use disorder, severe, dependence (H)  Patient reports that his cravings are controlled while at the TCU. He will be discharging home later this week. He is taking naltrexone 50 mg daily, denies SE.   -Continue naltrexone without changes.   -Encouraged plans for IOP.  Rona eval was completed 9/12.   -Encouraged AA attendance.   -     naltrexone (DEPADE/REVIA) 50 MG tablet; Take 1 tablet (50 mg) by mouth daily  Insomnia, unspecified type  Generalized anxiety disorder  Reporting difficulty sleeping at night, current prescribed hydroxyzine 10 mg TID bt his PCP. Is requesting to have the option to take a higher dose at bedtime. Patient asked that the new prescription be sent to the [facility] so that he can start taking before he is discharge. Verbal permission received to contact the nurse at the [facility] to get the pharmacy information and to update of the medication change.   --     hydrOXYzine (ATARAX) 25 MG tablet; Take 1 tablet (25 mg) by mouth nightly as needed for anxiety (sleep)    Orders Placed This Encounter   Medications    hydrOXYzine (ATARAX) 25 MG tablet     Sig: Take 1 tablet (25 mg) by mouth nightly as needed for anxiety (sleep)     Dispense:  30 tablet     Refill:  0    naltrexone (DEPADE/REVIA) 50 MG tablet     Sig: Take 1 tablet (50 mg) by mouth daily     Dispense:  30 tablet     Refill:  0       Problem list updated Sep 19, 2023   No problems updated.          PDMP Review         Value Time User    State PDMP site checked  Yes 9/5/2023  1:48 PM Sarahy Mullins CNP              RTC  Return in  "about 3 weeks (around 10/10/2023) for Follow up, with me, using a video visit 0930.      Counseled the patient on the importance of having a recovery program in addition to medication to manage recovery.  Components include avoiding isolating, having willingness to change, avoiding triggers and managing cravings. Encouraged having some type of sober network and practicing honesty with trusted support person(s). Encouraged other services such as counseling, 12 step or other self-help organizations.        SUBJECTIVE                                                      Jerad Ross is a 63 year old male with history of HTN, JULIANE, , CAD s/p CABG x3 9/2020, depression with anxiety, and alcohol use who presents for further evaluation of possible substance use disorder and management options.     Brief history: Initial visit 8/14/23. Jerad  is requesting a chemical health assessment to see if he \"should go to treatment of if AA is enough\". He reports that he began drinking 8 shots of whiskey 1-3 times weekly for past 4 months. States his depression and anxiety as well as opiate withdrawal triggered use escalating use. Has been prescribed opioid pain medication on/off since 3/2023 due to right hip pain 2/2 avascular necrosis which was revised 6/2023 and admits to taking more than prescribed on occasion and states that he realized he was taking them sometimes due to his emotions versus physical pain. He has not taken an opioid pain medication for past 3 weeks. States if he ever requires opioid pain medication in future, prefers to be tapered off.      He had a period of 4 years 1438-9800 of total abstinence from alcohol States he went to treatment for \"sexual compulsivity\" followed by a \"sober house\" and he just maintained abstinence after leaving sober house. States he went to sober house because he needed somewhere to go after leaving the [facility]. He is attending a 12 step group for his sexual compulsions. Has recently " "started attending AA as well.      Remote history of renal failure as an adolescent. Was on dialysis at age 16, and s/p kidney transplant at age 18. States he contracted hepatitis B from dialysis,      Substance Use History:   \"Have you ever had any history with [...] use?\" And \"When was your last use?  ALCOHOL - Drinks 8 shots of whiskey 1-3 times weekly for past 4 months, last drink 8/5/23.   CANNABIS - Denies  PRESCRIPTION STIMULANTS (includes Ritalin, Adderall, Vyvanse) - Denies  COCAINE/CRACK - Denies  METH/AMPHETAMINES (includes ecstacy, MDMA/connie) - Denies  OPIATES - Prescribed opiate pain medication on/off since 3/2023 due to right hip pain with revision 6/19/2023. States he took as prescribed mostly, but does admit to taking more than  prescribed on a few occasions. He needs to be tapered off of opioids in future as he did experience opiate w/d and began drinking to help with symptoms. No longer has prescription for opioids.   BENZODIAZEPINES (includes Ativan, Klonopin, Xanax) - Denies  KRATOM (mild opioid and stimulant effects) - Denies  KETAMINE - Denies  HALLUCINOGENS (includes DXM) - Denies  BEHAVIORAL (Gambling, Eating d/o, Compulsivity) - Sexual compulsivirty  History of treatment - Yes the [facility], \"sober house\", and 12 step  NICOTINE  Cigarettes: Denies           Previous withdrawal treatment episodes (e.g. detox): Denies  Previous [facility] treatment programs: Denies  Hospitalizations or overdose: Denies  Medical complications from substance use: Hx of  renal failure on dialysis at age 16, and kidney transplant, infected with hepatitis B from dialysis. Alcohol impacts his liver.   IV Drug use?: Denies  Previous Medication for Addiction Tx: Denies  Longest period of full abstinence: Almost 4 years 1893-9547  Activities that have previously supported abstinence: Some AA  Current Recovery Activities: AA    Recent HPI Details:9/5/23  -Hospitalized 8/30-9/1 for general weakness and SOB now at a TCU the " [facility] in [place] for rehab for strengthening.   -Started the naltrexone 50 mg daily tolerating well. Last drink 8/5/23.   -Attending physician recommended that he go to rehab then directly to inpatient treatment.   -Would like to go to inpatient treatment. Has had lashanda evals scheduled, but has missed due to being hospitalized. Assessments were scheduled 8/22 and 8/30.   -Would like to do the assessment as soon as possible. TCU is able to assist him with arranging transportation.   -Reporting difficulty sleeping which he believe contributed to his weakness leading to hospitalization. Also reports that he never fully recovered after his last hips replacement.   Alcohol use disorder, severe, dependence (H)  Reports cravings are controlled with naltrexone, denies alcohol use since 8/5. Is currently in a TCU following a 2 day observation stay due to weakness. It was recommended that he go to rehab facility then transfer to residential CD program. He still needs to complete a lashanda eval. He had to assessment scheduled but was hospitalized both times and unfortunately they were not completed while inpatient.  -Continue naltrexone 50 mg daily, TCU managing medications while he is at their facility. No refill needed today.   -LASHANDA eval scheduled for 9/12 at 1030. TCU to help arrange transportation.      2. Insomnia  Experiencing insomnia that he believes contributed to his symptoms of weakness. Discussed PAW symptoms and gabapentin may be effective. Plan to discuss his insomnia with TCU provider, will defer to TCU provider.     TODAY'S VISIT  HPI Sep 19, 2023    Finished PT and OT. Still in TCU until end of week  Completed the lashanda eval 9/12, inpatient  at [facility] was recommended, but they did not accept him. Now looking into IOP at , but is wanting to go to [place] to visit his mother/sister   Wanted to go to [place] \"as soon as possible\", and stay for the Winter.   Denies cravings, is experiencing some " irritability.  Is attending some AA meetings online. Plans to go to meeting in person once he is discharged from TCU.  Has a sponsor.   Started melatonin at [facility] which is helping some although he did not sleep well last night. Would like an increase to his hydroxyzine at bedtime.            OBJECTIVE                                                    PHYSICAL EXAM:  There were no vitals taken for this visit.    GENERAL: healthy, alert and no distress  EYES: Eyes grossly normal to inspection, PERRL and conjunctivae and sclerae normal  RESP: No respiratory distress  MENTAL STATUS EXAM  Appearance/Behavior: No appearant distress and Disheveled  Speech: Normal  Mood/Affect: normal affect  Insight: Adequate    LAB  No results found for any visits on 09/19/23.      HISTORY                                                    Problem list reviewed & adjusted, as indicated.  Patient Active Problem List   Diagnosis    BCC (basal cell carcinoma), trunk    JULIANE (obstructive sleep apnea)    HTN, kidney transplant related    Coronary arteriosclerosis due to lipid rich plaque    NSTEMI (non-ST elevated myocardial infarction) (H)    Depression    Diverticulosis    Hemorrhoids    Kidney replaced by transplant    Basal cell carcinoma    Squamous cell carcinoma    Dyslipidemia    CRP elevated    Glaucoma    AION (acute ischemic optic neuropathy)    Paracentral scotoma    Hip pain    Inflamed seborrheic keratosis    Intertrigo    Chronic hepatitis B (H)    Immunosuppression (H)    Hypogonadism in male    GERD (gastroesophageal reflux disease)    Aftercare following organ transplant    Acute midline low back pain without sciatica    Septic bursitis    Impaired mobility    CAD -- S/P CABG x 3 in 2020    Abnormal cardiovascular stress test    HTN (hypertension)    End stage renal disease (H)    Hip pain, right    Avascular necrosis of bones of both hips (H)    Chest pain, Probably chest wall in origin    Preoperative examination    Coronary  artery disease of native artery of native heart with stable angina pectoris (H)    Failed total hip arthroplasty, sequela    Generalized muscle weakness    Generalized anxiety disorder    Transient hypotension    Hypokalemia    Alcohol use disorder, severe, dependence (H)    Hypoxia    Multiple subsegmental pulmonary emboli without acute cor pulmonale (H)    Hypomagnesemia    General weakness    Unable to ambulate    Nonadherence to medication         MEDICATION LIST (prior to visit)  acetaminophen (TYLENOL) 500 MG tablet, Take 1,000 mg by mouth 3 times daily as needed for mild pain  albuterol (PROAIR HFA) 108 (90 Base) MCG/ACT inhaler, Inhale 2 puffs into the lungs every 6 hours as needed for shortness of breath / dyspnea or wheezing  aspirin 81 MG EC tablet, Take 1 tablet (81 mg) by mouth daily  budesonide (PULMICORT FLEXHALER) 90 MCG/ACT inhaler, Inhale 2 puffs into the lungs 2 times daily as needed (shortness of breath)  entecavir (BARACLUDE) 0.5 MG tablet, Take 1 tablet (0.5 mg) by mouth daily  FLUoxetine (PROZAC) 20 MG capsule, Take 1 capsule (20 mg) by mouth daily With 40mg for a total daily dose of 60mg  FLUoxetine (PROZAC) 40 MG capsule, Take 1 capsule (40 mg) by mouth daily  fluticasone-salmeterol (ADVAIR) 250-50 MCG/ACT inhaler, Inhale 1 puff into the lungs 2 times daily  furosemide (LASIX) 20 MG tablet, Take 1 tablet (20 mg) by mouth daily as needed (fluid retention)  hydrOXYzine (ATARAX) 10 MG tablet, Take 1 tablet (10 mg) by mouth every 8 hours as needed for anxiety  isosorbide mononitrate (IMDUR) 60 MG 24 hr tablet, Take 60 mg by mouth daily  latanoprost (XALATAN) 0.005 % ophthalmic solution, Place 1 drop into both eyes At Bedtime  metoprolol succinate ER (TOPROL XL) 25 MG 24 hr tablet, Take 0.5 tablets (12.5 mg) by mouth daily  Multiple Vitamins-Iron (ONE DAILY MULTIVITAMIN/IRON) TABS, Take 1 tablet by mouth daily  mycophenolate (GENERIC EQUIVALENT) 250 MG capsule, Take 3 capsules (750 mg) by mouth 2  times daily  nitroGLYcerin (NITROSTAT) 0.4 MG sublingual tablet, Place 1 tablet (0.4 mg) under the tongue every 5 minutes as needed for chest pain  Omega-3 Fatty Acids (OMEGA 3 PO), Take 1 capsule by mouth daily Dose unknown  pantoprazole (PROTONIX) 40 MG EC tablet, Take 1 tablet (40 mg) by mouth daily  polyethylene glycol (MIRALAX) 17 g packet, Take 17 g by mouth daily as needed   predniSONE (DELTASONE) 5 MG tablet, Take 1 tablet (5 mg) by mouth daily  Rivaroxaban ANTICOAGULANT 15 & 20 MG TBPK Starter Therapy Pack, Take 15 mg by mouth 2 times daily (with meals) for 21 days, THEN 20 mg daily with food for 9 days.  rosuvastatin (CRESTOR) 20 MG tablet, TAKE 1 TABLET(20 MG) BY MOUTH DAILY  tacrolimus (PROTOPIC) 0.1 % external ointment, Apply topically 2 times daily as needed    No current facility-administered medications on file prior to visit.      MEDICATION LIST (after visit)  Current Outpatient Medications   Medication    hydrOXYzine (ATARAX) 25 MG tablet    naltrexone (DEPADE/REVIA) 50 MG tablet    acetaminophen (TYLENOL) 500 MG tablet    albuterol (PROAIR HFA) 108 (90 Base) MCG/ACT inhaler    aspirin 81 MG EC tablet    budesonide (PULMICORT FLEXHALER) 90 MCG/ACT inhaler    entecavir (BARACLUDE) 0.5 MG tablet    FLUoxetine (PROZAC) 20 MG capsule    FLUoxetine (PROZAC) 40 MG capsule    fluticasone-salmeterol (ADVAIR) 250-50 MCG/ACT inhaler    furosemide (LASIX) 20 MG tablet    hydrOXYzine (ATARAX) 10 MG tablet    isosorbide mononitrate (IMDUR) 60 MG 24 hr tablet    latanoprost (XALATAN) 0.005 % ophthalmic solution    metoprolol succinate ER (TOPROL XL) 25 MG 24 hr tablet    Multiple Vitamins-Iron (ONE DAILY MULTIVITAMIN/IRON) TABS    mycophenolate (GENERIC EQUIVALENT) 250 MG capsule    nitroGLYcerin (NITROSTAT) 0.4 MG sublingual tablet    Omega-3 Fatty Acids (OMEGA 3 PO)    pantoprazole (PROTONIX) 40 MG EC tablet    polyethylene glycol (MIRALAX) 17 g packet    predniSONE (DELTASONE) 5 MG tablet    Rivaroxaban  ANTICOAGULANT 15 & 20 MG TBPK Starter Therapy Pack    rosuvastatin (CRESTOR) 20 MG tablet    tacrolimus (PROTOPIC) 0.1 % external ointment     No current facility-administered medications for this visit.         Allergies   Allergen Reactions    Penicillins Shortness Of Breath and Hives    Keflex [Cephalexin Hcl] Unknown     Patient could not recall reaction    Sulfa Antibiotics Rash    Tetracycline Unknown     Patient could not recall reaction       At least 30 min spent on day of encounter in review of medical record,  review, obtaining histories, discussing recommendations, counseling/coordination of care    Sarahy Mullins, TABITHA,MSN, AGNP-C  [facility]   [address] , [address]   [place] [address]   Phone # [phone]

## 2023-09-19 NOTE — NURSING NOTE
Is the patient currently in the state of [place]? YES    Visit mode:VIDEO    If the visit is dropped, the patient can be reconnected by: VIDEO VISIT: Text to cell phone:   Telephone Information:   Mobile [phone]       Will anyone else be joining the visit? No  (If patient encounters technical issues they should call [phone])    How would you like to obtain your AVS? MyChart    Are changes needed to the allergy or medication list? No    Rooming Documentation: Assigned questionnaire(s) completed .    Reason for visit: RECHECK     Karoline Swan, VVF

## 2023-09-19 NOTE — TELEPHONE ENCOUNTER
Patient reporting difficulty sleeping. Patient taking hydroxyzine 10 mg TID prn, states he takes about once per day. Is requesting an increased dose at bedtime. New prescription for 25 mg every bedtime as needed provided. Patient is currently in [facility] and will be leaving later this week.  Patient gave me verbal approval to contact facility to update them of medication change. They requested the order be faxed to them at [phone].

## 2023-09-19 NOTE — PATIENT INSTRUCTIONS
May take hydroxyzine 25 mg at bedtime as needed for anxiety/sleep.     Continue naltrexone 50 mg daily.     Patient Education   Addiction Medicine  What to Expect  Here's what to expect from our Addiction Medicine program.  About Addiction Medicine  Addiction Medicine clinics help you with substance use problems. You set your own goals. We try to help you reach your goals. Your care plan can include:  Medicine  Creating a recovery plan  Helping you find local resources  Helping with treatment options  Clinic phone number and addresses  Clinic Phone: [phone]  Mental Health and Addiction Clinic  [facility]  [address] [place] [address], [address]  [place] [address]  [facility]  [address], [address]  [place] [address]  Walk-in services  We offer walk-in care for patients at the Recovery Clinic. This is only for patients with Opioid Use Disorder (OUD). Anyone with OUD is welcome. Our providers will refer you to the Recovery Clinic if you're struggling to keep up with your medicines or appointments.  Recovery Clinic ([facility])  [address], [address]  [place] [address]  Phone: [phone]  The Recovery Clinic is open for walk-ins Monday to Friday 9 a.m. to 11:30 a.m. and 12:30 p.m. to 3 p.m.  How it works  Come to your visits every time. The treatment works better when you do.   You can have as many visits as you need. When you're better, we'll refer you back to being cared for by your family doctor.   If you need it, we'll send you to doctors, psychiatrists, therapists, and other providers. We focus on treating addiction. We don't treat other problems, like managing other medicines or non-addiction issues.  About visits  Urine drug testing  We'll often test your pee (urine) for drugs. This is the only way we can know for sure whether or not you're using drugs. It helps us treat you without judgement.   Suboxone (buprenorphine)  If  "you're taking buprenorphine, you'll have a lot of visits at first. If your problem is getting worse, or you're using substances, we may schedule you for extra visits.   Cancelling visits  If you can't come to your visit, please call us right away at [phone]. If you don't cancel at least 24 hours (1 full day) before your visit, that's \"late cancellation.\"  Being late to visits  If you come late, you may not be seen. This will count as a \"no-show.\"  Please call the clinic if you're running late. This will help us plan, but it doesn't mean you'll be seen.   Being late is:  More than 15 minutes late for a return visit.  More than 30 minutes late for your first visit.  If you cancel late or don't show up 2 times within 6 months, we may transfer you to another clinic.   Getting help between visits  If you need help between visits, you can call us Monday to Friday from 8 a.m. to 4:30 p.m. at [phone]. You can also send us a message on [employer].  Medicine refills  If you miss or cancel a visit, you can still ask for a refill. But we can only refill your medicines if you've made a new appointment.  Please call your pharmacy for medicine refills. If you have a question about your refill, call us at [phone].  It takes up to 2 business days to refill your drugs. Let us know 2 to 3 days before you run out. Don't call more than 1 week before you run out. That's too early.   Please make sure we have your right phone number.  If we have a problem with your refill, we'll call you. If we call you, please call us back right away. If you don't, you may not get your medicines quickly.   Call your pharmacy to find out if your medicines are ready.   Keep your medicines in a safe place. Keep them away from pets and children. If your medicines are lost or stolen, we usually don't replace them. We recommend you file a police report if your medicines are stolen. Your insurance may not pay for early refills, even if you have " a prescription.  Forms  Please give us at least 3 business days to fill out any forms. Bring the forms to your visits if you can. We may refer you to other members of your care team to complete the forms.   Emergency care   Call 911 or go to the nearest emergency room if your life or someone else's life is in danger.  Call 988 anytime for the Suicide and Crisis Lifeline.  If you need care when we're closed, call your family doctor to see if they can help. You can also go to urgent care or an emergency room. [facility] emergency rooms may be able to give you buprenorphine or other medicine refills.  Thank you for choosing us for your care.  For informational purposes only. Not to replace the advice of your health care provider. Copyright   2023 [facility]. All rights reserved. [employer] [id number] - REV 05/23.

## 2023-09-19 NOTE — TELEPHONE ENCOUNTER
Discussed this case with Sarahy Mullins, this is a coordination of care and JAZMINE along with order for hydroxyzine was faxed to the [facility] at [phone].    Rochelle Sahni RN on 9/19/2023 at 4:46 PM

## 2023-09-19 NOTE — TELEPHONE ENCOUNTER
JAZMINE faxed to [facility] at [phone].     ERI HEART RN on 9/19/2023 at 4:26 PM

## 2023-09-20 RX ORDER — POTASSIUM CHLORIDE 1500 MG/1
20 TABLET, EXTENDED RELEASE ORAL DAILY
Qty: 100 TABLET | Refills: 0 | Status: SHIPPED | OUTPATIENT
Start: 2023-09-20 | End: 2023-10-03

## 2023-09-20 NOTE — TELEPHONE ENCOUNTER
The below telephone note was routed to the [facility] UR department at: P [facility] OUTPATIENT UR [[id number]] and to the [facility] IOP Admissions Department at: P CD ADULT IOP INTAKE POOL [[id number]] on 9/20/2023 as a referral for IOP treatment at [facility].    A.)  Name: Jerad Ross Tel #: [phone]  B.)  MRN: [id number]  C.)  Referral is for: a MEDICARE Intensive Outpatient program at one of the [facility] locations for co-occurring mental health and substance use disorder treatment.  D.)  Notes: Pt lives in [facility], prefers that programming.     Thanks,    Mitra Salinas

## 2023-09-28 ENCOUNTER — VIRTUAL VISIT (OUTPATIENT)
Dept: PSYCHIATRY | Facility: CLINIC | Age: 63
End: 2023-09-28
Attending: NURSE PRACTITIONER
Payer: COMMERCIAL

## 2023-09-28 DIAGNOSIS — F33.41 RECURRENT MAJOR DEPRESSIVE DISORDER, IN PARTIAL REMISSION (H): ICD-10-CM

## 2023-09-28 DIAGNOSIS — G47.00 INSOMNIA, UNSPECIFIED TYPE: ICD-10-CM

## 2023-09-28 DIAGNOSIS — F41.1 GENERALIZED ANXIETY DISORDER: Chronic | ICD-10-CM

## 2023-09-28 PROCEDURE — 99442 PR PHYSICIAN TELEPHONE EVALUATION 11-20 MIN: CPT | Mod: 95 | Performed by: NURSE PRACTITIONER

## 2023-09-28 RX ORDER — FLUOXETINE 40 MG/1
40 CAPSULE ORAL DAILY
Qty: 90 CAPSULE | Refills: 1 | Status: SHIPPED | OUTPATIENT
Start: 2023-09-28 | End: 2023-11-17

## 2023-09-28 RX ORDER — HYDROXYZINE HYDROCHLORIDE 25 MG/1
25 TABLET, FILM COATED ORAL
Qty: 90 TABLET | Refills: 0 | Status: SHIPPED | OUTPATIENT
Start: 2023-09-28 | End: 2023-10-16

## 2023-09-28 ASSESSMENT — PAIN SCALES - GENERAL: PAINLEVEL: NO PAIN (0)

## 2023-09-28 ASSESSMENT — PATIENT HEALTH QUESTIONNAIRE - PHQ9
SUM OF ALL RESPONSES TO PHQ QUESTIONS 1-9: 4
SUM OF ALL RESPONSES TO PHQ QUESTIONS 1-9: 4
10. IF YOU CHECKED OFF ANY PROBLEMS, HOW DIFFICULT HAVE THESE PROBLEMS MADE IT FOR YOU TO DO YOUR WORK, TAKE CARE OF THINGS AT HOME, OR GET ALONG WITH OTHER PEOPLE: SOMEWHAT DIFFICULT

## 2023-09-28 NOTE — NURSING NOTE
Is the patient currently in the state of [place]? YES    Visit mode:TELEPHONE    If the visit is dropped, the patient can be reconnected by: TELEPHONE VISIT: Phone number:   Telephone Information:   Mobile [phone]       Will anyone else be joining the visit? NO  (If patient encounters technical issues they should call [phone]    How would you like to obtain your AVS? MyChart    Are changes needed to the allergy or medication list? No    Reason for visit: RECHECK    Olimpia LUNA
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
## 2023-09-28 NOTE — PATIENT INSTRUCTIONS
**For crisis resources, please see the information at the end of this document**   Patient Education    Thank you for coming to the [facility] [place] [facility].     Lab Testing:  If you had lab testing today and your results are reassuring or normal they will be mailed to you or sent through [facility] within 7 days. If the lab tests need quick action we will call you with the results. The phone number we will call with results is # [phone]. If this is not the best number please call our clinic and change the number.     Medication Refills:  If you need any refills please call your pharmacy and they will contact us. Our fax number for refills is [phone].   Three business days of notice are needed for general medication refill requests.   Five business days of notice are needed for controlled substance refill requests.   If you need to change to a different pharmacy, please contact the new pharmacy directly. The new pharmacy will help you get your medications transferred.     Contact Us:  Please call [phone] during business hours (8-5:00 M-F).   If you have medication related questions after clinic hours, or on the weekend, please call [phone].     Financial Assistance [phone]   Medical Records [phone]       MENTAL HEALTH CRISIS RESOURCES:  For a emergency help, please call 911 or go to the nearest Emergency Department.     Emergency Walk-In Options:   [facility] Unit @ [facility] ([place]): [phone] - Specialized mental health emergency area designed to be calming  [facility] ([place]): [phone]  [facility] ([place]): [phone]  [facility]): [phone]    [facility] Crisis Information:   [place]: [phone]  [place]: [phone]  [place] ([place]) - Adult: [phone]     Child: [phone]  [place] - Adult: [phone]     Child: [phone]  [place]:  [phone]  List of all [facility] resources:   https://[place].gov/dhs/people-we-serve/adults/health-care/mental-health/resources/crisis-contacts.jsp    National Crisis Information:   Crisis Text Line: Text  [place]  to 292582  Suicide & Crisis Lifeline: 988  National Suicide Prevention Lifeline: 2-276-780-TALK (1-130.554.4760)       For online chat options, visit https://suicidepreventionlifeline.org/chat/  Poison Control Center: 2-586-525-4533  Trans Lifeline: 5-953-214-0663 - Hotline for transgender people of all ages  The [place] Project: 3-397-377-5226 - Hotline for LGBT youth     For Non-Emergency Support:   Fast Tracker: Mental Health & Substance Use Disorder Resources -   https://www.[employer].org/

## 2023-09-29 ENCOUNTER — TELEPHONE (OUTPATIENT)
Dept: BEHAVIORAL HEALTH | Facility: CLINIC | Age: 63
End: 2023-09-29

## 2023-10-02 ENCOUNTER — LAB (OUTPATIENT)
Dept: LAB | Facility: CLINIC | Age: 63
End: 2023-10-02
Payer: COMMERCIAL

## 2023-10-02 ENCOUNTER — TELEPHONE (OUTPATIENT)
Dept: ADDICTION MEDICINE | Facility: CLINIC | Age: 63
End: 2023-10-02

## 2023-10-02 DIAGNOSIS — Z94.0 KIDNEY TRANSPLANTED: ICD-10-CM

## 2023-10-02 DIAGNOSIS — Z48.298 AFTERCARE FOLLOWING ORGAN TRANSPLANT: ICD-10-CM

## 2023-10-02 DIAGNOSIS — D64.9 ANEMIA, UNSPECIFIED TYPE: ICD-10-CM

## 2023-10-02 LAB
ANION GAP SERPL CALCULATED.3IONS-SCNC: 9 MMOL/L (ref 7–15)
BUN SERPL-MCNC: 8.6 MG/DL (ref 8–23)
CALCIUM SERPL-MCNC: 9.6 MG/DL (ref 8.8–10.2)
CHLORIDE SERPL-SCNC: 103 MMOL/L (ref 98–107)
CREAT SERPL-MCNC: 0.7 MG/DL (ref 0.67–1.17)
DEPRECATED HCO3 PLAS-SCNC: 27 MMOL/L (ref 22–29)
EGFRCR SERPLBLD CKD-EPI 2021: >90 ML/MIN/1.73M2
ERYTHROCYTE [DISTWIDTH] IN BLOOD BY AUTOMATED COUNT: 17.1 % (ref 10–15)
GLUCOSE SERPL-MCNC: 108 MG/DL (ref 70–99)
HCT VFR BLD AUTO: 41.3 % (ref 40–53)
HGB BLD-MCNC: 12.6 G/DL (ref 13.3–17.7)
MCH RBC QN AUTO: 26 PG (ref 26.5–33)
MCHC RBC AUTO-ENTMCNC: 30.5 G/DL (ref 31.5–36.5)
MCV RBC AUTO: 85 FL (ref 78–100)
PLATELET # BLD AUTO: 351 10E3/UL (ref 150–450)
POTASSIUM SERPL-SCNC: 3.8 MMOL/L (ref 3.4–5.3)
RBC # BLD AUTO: 4.85 10E6/UL (ref 4.4–5.9)
SODIUM SERPL-SCNC: 139 MMOL/L (ref 135–145)
WBC # BLD AUTO: 8.1 10E3/UL (ref 4–11)

## 2023-10-02 PROCEDURE — 80048 BASIC METABOLIC PNL TOTAL CA: CPT | Performed by: PATHOLOGY

## 2023-10-02 PROCEDURE — 82728 ASSAY OF FERRITIN: CPT | Performed by: PATHOLOGY

## 2023-10-02 PROCEDURE — 85027 COMPLETE CBC AUTOMATED: CPT | Performed by: PATHOLOGY

## 2023-10-02 PROCEDURE — 83550 IRON BINDING TEST: CPT | Performed by: PATHOLOGY

## 2023-10-02 PROCEDURE — 83540 ASSAY OF IRON: CPT | Performed by: PATHOLOGY

## 2023-10-02 PROCEDURE — 36415 COLL VENOUS BLD VENIPUNCTURE: CPT | Performed by: PATHOLOGY

## 2023-10-02 NOTE — TELEPHONE ENCOUNTER
Called patient to see how he is doing, and if he is home from TCU stay. Patient has follow up appointment 10/10/23. Left VM to call back if needed, or he could wait until his follow up appointment.

## 2023-10-03 ENCOUNTER — LAB (OUTPATIENT)
Dept: LAB | Facility: CLINIC | Age: 63
End: 2023-10-03
Payer: COMMERCIAL

## 2023-10-03 ENCOUNTER — OFFICE VISIT (OUTPATIENT)
Dept: INTERNAL MEDICINE | Facility: CLINIC | Age: 63
End: 2023-10-03
Payer: COMMERCIAL

## 2023-10-03 ENCOUNTER — TELEPHONE (OUTPATIENT)
Dept: BEHAVIORAL HEALTH | Facility: CLINIC | Age: 63
End: 2023-10-03

## 2023-10-03 VITALS
BODY MASS INDEX: 25.11 KG/M2 | SYSTOLIC BLOOD PRESSURE: 104 MMHG | DIASTOLIC BLOOD PRESSURE: 71 MMHG | OXYGEN SATURATION: 96 % | HEIGHT: 68 IN | HEART RATE: 63 BPM | WEIGHT: 165.7 LBS

## 2023-10-03 DIAGNOSIS — Z94.0 KIDNEY REPLACED BY TRANSPLANT: ICD-10-CM

## 2023-10-03 DIAGNOSIS — Z94.0 KIDNEY REPLACED BY TRANSPLANT: Primary | ICD-10-CM

## 2023-10-03 DIAGNOSIS — R19.7 DIARRHEA, UNSPECIFIED TYPE: ICD-10-CM

## 2023-10-03 DIAGNOSIS — D64.9 ANEMIA, UNSPECIFIED TYPE: Primary | ICD-10-CM

## 2023-10-03 LAB
FERRITIN SERPL-MCNC: 50 NG/ML (ref 31–409)
IRON BINDING CAPACITY (ROCHE): 286 UG/DL (ref 240–430)
IRON SATN MFR SERPL: 10 % (ref 15–46)
IRON SERPL-MCNC: 30 UG/DL (ref 61–157)

## 2023-10-03 PROCEDURE — 90480 ADMN SARSCOV2 VAC 1/ONLY CMP: CPT | Performed by: INTERNAL MEDICINE

## 2023-10-03 PROCEDURE — 99000 SPECIMEN HANDLING OFFICE-LAB: CPT | Performed by: PATHOLOGY

## 2023-10-03 PROCEDURE — 90471 IMMUNIZATION ADMIN: CPT | Performed by: INTERNAL MEDICINE

## 2023-10-03 PROCEDURE — 82570 ASSAY OF URINE CREATININE: CPT | Performed by: INTERNAL MEDICINE

## 2023-10-03 PROCEDURE — 99396 PREV VISIT EST AGE 40-64: CPT | Mod: 25 | Performed by: INTERNAL MEDICINE

## 2023-10-03 ASSESSMENT — ENCOUNTER SYMPTOMS
HALLUCINATIONS: 0
NECK MASS: 0
BLOOD IN STOOL: 0
CHILLS: 0
DECREASED CONCENTRATION: 1
DEPRESSION: 1
MUSCLE CRAMPS: 0
ABDOMINAL PAIN: 0
CONSTIPATION: 0
DIARRHEA: 1
VOMITING: 0
TASTE DISTURBANCE: 0
DECREASED APPETITE: 1
NECK PAIN: 0
TROUBLE SWALLOWING: 0
BACK PAIN: 0
PANIC: 0
SORE THROAT: 0
INSOMNIA: 1
ARTHRALGIAS: 1
NAUSEA: 0
MYALGIAS: 1
SINUS PAIN: 0
HOARSE VOICE: 0
POLYDIPSIA: 0
FATIGUE: 1
STIFFNESS: 0
BOWEL INCONTINENCE: 0
ALTERED TEMPERATURE REGULATION: 0
JOINT SWELLING: 0
JAUNDICE: 0
HEARTBURN: 0
INCREASED ENERGY: 1
NERVOUS/ANXIOUS: 1
RECTAL PAIN: 0
SMELL DISTURBANCE: 0
MUSCLE WEAKNESS: 0
POLYPHAGIA: 0
FEVER: 0
NIGHT SWEATS: 0
BLOATING: 0
WEIGHT LOSS: 1

## 2023-10-03 NOTE — NURSING NOTE
"Jerad Ross is a 63 year old male patient that presents today in clinic for the following:    Chief Complaint   Patient presents with    Physical     Pt here for annual physical and has had some bleeding when taking ear plugs out. Pt also has concerns for low iron levels and loose stools.     The patient's allergies and medications were reviewed as noted. A set of vitals were recorded as noted without incident: /71 (BP Location: Right arm, Patient Position: Sitting, Cuff Size: Adult Regular)   Pulse 63   Ht 1.727 m (5' 7.99\")   Wt 75.2 kg (165 lb 11.2 oz)   SpO2 96%   BMI 25.20 kg/m  . The patient does not have any other questions for the provider.    Shelia Damico, EMT at 2:00 PM on 10/3/2023  "

## 2023-10-03 NOTE — NURSING NOTE
Chief Complaint   Patient presents with    Physical     Pt here for annual physical and has had some bleeding when taking ear plugs out. Pt also has concerns for low iron levels and loose stools.       Jerad Ross received the COVID-19 '23-'24 Comirnaty Pfizer Vaccine in clinic today at the request of Dr. Perry.   Prior to injection, verified patient identity using patient's name and date of birth.  The immunization site was cleaned with an alcohol prep wipe.   Patient has no history of reaction to vaccines.    Prior to immunization administration, verified patients identity using patient s name and date of birth. Please see Immunization Activity for additional information.     The following medication was given:     MEDICATION:  COVID-19 '23-'24 Comirnaty Pfizer Vaccine  ROUTE: IM  SITE: Deltoid - Left  DOSE: 0.3 mL  LOT #: [id number]  : Pfizer  EXPIRATION DATE: 12/12/2023  NDC#: 6963-0289-53   Was there drug waste? No    The immunization was given without incident--see immunization list for administration details.   No swelling or redness was observed at the site of injection after the immunization was given.  Due to injection administration, patient instructed to remain in clinic for 15 minutes  afterwards, and to report any adverse reaction to me immediately.    Drug Amount Wasted:  None.  Vial/Syringe: Single dose vial    Alona Krishna LPN  October 3, 2023  2:48 PM

## 2023-10-03 NOTE — TELEPHONE ENCOUNTER
----- Message from Kaelyn Mckeon sent at 10/3/2023  1:28 PM CDT -----  Regarding: FW: SI    ----- Message -----  From: Lesli Valdez LADC  Sent: 10/3/2023   1:01 PM CDT  [facility]; Lorin Wiley; #  Subject: SI                                               This patient MANSI LIRA [[id number]]  is scheduled for a service initiation for the [facility] morning program at 11:30 AM with Yaritza Lemus

## 2023-10-03 NOTE — PROGRESS NOTES
HPI  63-year-old presents today for physical examination.  He has been doing reasonably well.  He is planning to be discharged home from the SNF and another week or so.  He is planning to follow-up with [facility] regarding his hip exercises and rehab as he is continuing to have some pain and discomfort in the right hip.  He also has a significant leg length discrepancy with shorter left leg than right and is apparently scheduled to get some lifts in his left shoe to help even this out which should help along with the therapy for improving his symptoms.  He has had intermittent loose stools and some diarrhea periodically since the hospital discharge.  He was treated with antibiotics for a couple of days and has not been checked for C. difficile so we will get that.  He also had some bleeding from his right ear recently after removing some earplugs.  He has not noted any significant hearing change in association with this and the bleeding has subsequently subsided.  Otherwise he has been doing reasonably well has no new or different symptoms or concerns.  Past Medical History:   Diagnosis Date    Acute midline low back pain without sciatica     AION (acute ischemic optic neuropathy)     Anemia in chronic renal disease     Arthritis     Avascular necrosis of bones of both hips (H)     s/p bilateral hip replacements    Avascular necrosis of bones of both hips (H)     s/p bilateral hip replacements    Basal cell carcinoma     Chronic hepatitis B (H)     Clostridium difficile colitis     COPD (chronic obstructive pulmonary disease) (H)     Coronary artery disease involving native coronary artery of native heart without angina pectoris 06/17/2014    Coronary angiogram 6/17/14: Severe distal 3-vessel disease involving small vessels, not amenable to PCI or CABG.    CRP elevated     Depression     Depressive disorder     Diverticulosis     Dyslipidemia     Elevated C-reactive protein (CRP)     FSGS (focal segmental  glomerulosclerosis)     FSGS (focal segmental glomerulosclerosis)     Gastric ulcer with hemorrhage 02/12/2012    Gastric ulcer with hemorrhage 02/12/2012    GERD (gastroesophageal reflux disease)     Glaucoma     OHTN    Glaucoma     Hemorrhoids     Hemorrhoids     History of blood transfusion     HTN (hypertension)     Hyperlipidemia     Hypertension secondary to other renal disorders     Hypogonadism in male     Immunosuppressed status (H)     Kidney replaced by transplant     focal glomerulosclerosis     Kidney transplanted     focal glomerulosclerosis     NSTEMI (non-ST elevated myocardial infarction) (H) 06/17/2014    NSTEMI (non-ST elevated myocardial infarction) (H) 06/17/2014    JULIANE (obstructive sleep apnea)     Doesn't use CPAP    Paracentral scotoma     LE    Secondary renal hyperparathyroidism (H)     Secondary renal hyperparathyroidism (H)     Septic bursitis     Squamous cell carcinoma     Uncomplicated asthma      Past Surgical History:   Procedure Laterality Date    APPENDECTOMY      ARTHROPLASTY REVISION HIP Right 6/19/2023    Procedure: RIGHT HIP REVISION;  Surgeon: Juan Mcdonough MD;  Location:  OR    BIOPSY      Cardiac Bypass surgery  06/08/2020    [facility]     CATARACT EXTRACTION EXTRACAPSULAR W/ INTRAOCULAR LENS IMPLANTATION Bilateral 4-20-10, 6-1-10    CATARACT IOL, RT/LT  04/19/2000    RE    CATARACT IOL, RT/LT  06/01/2000    LE    COLECTOMY PARTIAL  01/01/1983     10 cm, diverticulitis     COLECTOMY SUBTOTAL  1983    10 cm, diverticulitis    COLONOSCOPY  02/13/2012    Procedure:COLONOSCOPY; Surgeon:SLOAN GALLARDO; Location: GI    COLONOSCOPY N/A 01/22/2020    Procedure: Colonoscopy, With Polypectomy And Biopsy;  Surgeon: Aston Kiran MD;  Location:  GI    CV CORONARY ANGIOGRAM N/A 06/02/2020    Procedure: Coronary Angiogram;  Surgeon: Alona Florentino MD;  Location: [facility] Lab;  Service: Cardiology    CV CORONARY ANGIOGRAM N/A 09/20/2021     Procedure: Coronary Angiogram;  Surgeon: Adam Agudelo MD;  Location: [facility] LAB CV    CV LEFT HEART CATHETERIZATION WITHOUT LEFT VENTRICULOGRAM Left 06/02/2020    Procedure: Left Heart Catheterization Without Left Ventriculogram;  Surgeon: Alona Florentino MD;  Location: [facility] Cath Lab;  Service: Cardiology    CV SUPRAVALVULAR AORTOGRAM N/A 09/20/2021    Procedure: Supravalvular Aortagram;  Surgeon: Adam Agudelo MD;  Location: [facility] LAB CV    ESOPHAGOSCOPY, GASTROSCOPY, DUODENOSCOPY (EGD), COMBINED  02/13/2012    Procedure:COMBINED ESOPHAGOSCOPY, GASTROSCOPY, DUODENOSCOPY (EGD); Surgeon:SLOAN GALLARDO; Location: GI    ESOPHAGOSCOPY, GASTROSCOPY, DUODENOSCOPY (EGD), COMBINED  02/13/2012    EXTRACAPSULAR CATARACT EXTRATION WITH INTRAOCULAR LENS IMPLANT  4-20-10, 6-1-10    Rt, Lt    HERNIA REPAIR  1995    Lt inguinal    HERNIA REPAIR  01/01/1995    Lt inguinal    HIP SURGERY      1981, bilat MITZI, revised 2001, 2005    HIP SURGERY  01/01/1981    bilat MITZI, revised 2001, 2005    IR FINE NEEDLE ASPIRATION W ULTRASOUND  04/10/2023    KIDNEY SURGERY  1978 and 1993    transplant    KNEE SURGERY  1983, 1987    bilat TKA    MOHS MICROGRAPHIC PROCEDURE      MOHS MICROGRAPHIC PROCEDURE      ORTHOPEDIC SURGERY      OTHER SURGICAL HISTORY      kidney [id number], 1993    PHACOEMULSIFICATION CLEAR CORNEA WITH STANDARD INTRAOCULAR LENS IMPLANT Right 04/19/2000    PHACOEMULSIFICATION CLEAR CORNEA WITH STANDARD INTRAOCULAR LENS IMPLANT Left 06/01/2000    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, auxiliary spleen    TONSILLECTOMY      TRANSPLANT      VASCULAR SURGERY  1976     Family History   Problem Relation Age of Onset    Cardiovascular Father         AI with valve repair    Hypertension Father     Kidney Disease Father     Other - See Comments Father         AI with valve repair    Anxiety Disorder Maternal Grandmother     Depression Maternal Grandmother     Substance Abuse Maternal  Grandfather     Cerebrovascular Disease Maternal Grandfather     Other - See Comments Maternal Grandfather         cerebrovascular disease    Cancer Paternal Grandmother         ovarian ca    Ovarian Cancer Paternal Grandmother     Cerebrovascular Disease Paternal Grandfather     Kidney Disease Paternal Aunt     Kidney Disease Paternal Aunt     Skin Cancer No family hx of     Glaucoma No family hx of     Macular Degeneration No family hx of     Amblyopia No family hx of     Melanoma No family hx of     Diabetes No family hx of      Answers submitted by the patient for this visit:  Symptoms you have experienced in the last 30 days (Submitted on 10/3/2023)  General Symptoms: Yes  Skin Symptoms: No  HENT Symptoms: Yes  EYE SYMPTOMS: No  HEART SYMPTOMS: No  LUNG SYMPTOMS: No  INTESTINAL SYMPTOMS: Yes  URINARY SYMPTOMS: No  REPRODUCTIVE SYMPTOMS: No  SKELETAL SYMPTOMS: Yes  BLOOD SYMPTOMS: No  NERVOUS SYSTEM SYMPTOMS: No  MENTAL HEALTH SYMPTOMS: Yes  Please answer the questions below to tell us what conditions you are experiencing: (Submitted on 10/3/2023)  Ear pain: No  Ear discharge: Yes  Hearing loss: Yes  Tinnitus: No  Nosebleeds: No  Sinus pain: No  Trouble swallowing: No   Voice hoarseness: No  Mouth sores: No  Sore throat: No  Tooth pain: No  Change in taste: No  Change in sense of smell: No  Dry mouth: Yes  Hearing aid used: No  Neck lump: No  Please answer the questions below to tell us what conditions you are experiencing: (Submitted on 10/3/2023)  Fever: No  Loss of appetite: Yes  Weight loss: Yes  Fatigue: Yes  Night sweats: No  Chills: No  Increased stress: Yes  Excessive hunger: No  Excessive thirst: No  Feeling hot or cold when others believe the temperature is normal: No  Loss of height: No  Post-operative complications: Yes  Surgical site pain: Yes  Hallucinations: No  Change in or Loss of Energy: Yes  Hyperactivity: No  Confusion: No  Please answer the questions below to tell us what conditions you are  "experiencing: (Submitted on 10/3/2023)  Heart burn or indigestion: No  Nausea: No  Vomiting: No  Abdominal pain: No  Bloating: No  Constipation: No  Diarrhea: Yes  Blood in stool: No  Black stools: No  Rectal or Anal pain: No  Fecal incontinence: No  Yellowing of skin or eyes: No  Vomit with blood: No  Change in stools: Yes  Please answer the questions below to tell us what condition you are experiencing: (Submitted on 10/3/2023)  Back pain: No  Muscle aches: Yes  Neck pain: No  Swollen joints: No  Joint pain: Yes  Bone pain: No  Muscle cramps: No  Muscle weakness: No  Joint stiffness: No  Bone fracture: No  Please answer the questions below to tell us what condition you are experiencing: (Submitted on 10/3/2023)  Nervous or Anxious: Yes  Depression: Yes  Trouble sleeping: Yes  Trouble thinking or concentrating: Yes  Mood changes: No  Panic attacks: No    Exam:  /71 (BP Location: Right arm, Patient Position: Sitting, Cuff Size: Adult Regular)   Pulse 63   Ht 1.727 m (5' 7.99\")   Wt 75.2 kg (165 lb 11.2 oz)   SpO2 96%   BMI 25.20 kg/m    165 lbs 11.2 oz  Physical Exam   The patient is alert, oriented with a clear sensorium.   Skin shows numerous seborrheic keratotic lesions   Head is normocephalic and atraumatic.   Eyes show PERRLA  Ears show normal left TM, the right inferior canal is hemorrhagic with some old blood but no active bleeding.  He has small amount of wax but an intact and benign-appearing tympanic membrane  Mouth shows clear oral mucosa.   Neck shows no nodes, thyromegaly or bruits.   Back is non tender.  Lungs are clear to percussion and auscultation.   Heart shows normal S1 and S2 without murmur or gallop.   Abdomen is soft and nontender with transplanted kidney palpable in the left lower quadrant  Extremities show no edema and no evidence of active synovitis.   Neurologic examination shows cranial nerves II-XII intact. Motor shows 5/5 strength. Reflexes are symmetric.     ASSESSMENT  1 S/P " right hip arthroplasty with ongoing pain and leg length discrepancy  2  Loose stools R/O C diff  3 Chronic Hepatitis B on entecavir  4 depression in remission  5 hyperlipidemia on rosuvastatin  6 JULIANE no CPAP  7 S/P kidney transplant  8 CAD S/P CABG 2020 asymptomatic  9 hypertension well controlled  10 history colon polyps due for colonoscopy in 2025  11 hypotestosterone untreated  12 right ear canal trauma appears healing    Plan  We will update his immunizations with a COVID booster today.  He is concerned regarding his improving anemia so we will check his iron studies.  We will check his microalbumin and stool for C. difficile.    This note was completed using Dragon voice recognition software.      Aston Perry MD  General Internal Medicine  [facility]  [phone]

## 2023-10-04 ENCOUNTER — TELEPHONE (OUTPATIENT)
Dept: BEHAVIORAL HEALTH | Facility: CLINIC | Age: 63
End: 2023-10-04

## 2023-10-04 LAB
CREAT UR-MCNC: 73 MG/DL
MICROALBUMIN UR-MCNC: 27.5 MG/L
MICROALBUMIN/CREAT UR: 37.67 MG/G CR (ref 0–17)

## 2023-10-04 NOTE — TELEPHONE ENCOUNTER
10/3/2023  Spoke with patient and scheduled his SI for 10/10/2023 at 11:30 AM at [facility] co-occurring day program  Lesli MENJIVAR, Formerly named [facility]

## 2023-10-06 DIAGNOSIS — B18.1 CHRONIC VIRAL HEPATITIS B WITHOUT DELTA AGENT AND WITHOUT COMA (H): Primary | ICD-10-CM

## 2023-10-06 PROCEDURE — 91200 LIVER ELASTOGRAPHY: CPT | Mod: 26 | Performed by: PHYSICIAN ASSISTANT

## 2023-10-09 ENCOUNTER — TELEPHONE (OUTPATIENT)
Dept: GASTROENTEROLOGY | Facility: CLINIC | Age: 63
End: 2023-10-09

## 2023-10-10 ENCOUNTER — HOSPITAL ENCOUNTER (OUTPATIENT)
Dept: BEHAVIORAL HEALTH | Facility: CLINIC | Age: 63
Discharge: HOME | End: 2023-10-10
Attending: FAMILY MEDICINE
Payer: COMMERCIAL

## 2023-10-10 ENCOUNTER — VIRTUAL VISIT (OUTPATIENT)
Dept: ADDICTION MEDICINE | Facility: CLINIC | Age: 63
End: 2023-10-10
Payer: COMMERCIAL

## 2023-10-10 ENCOUNTER — DOCUMENTATION ONLY (OUTPATIENT)
Dept: BEHAVIORAL HEALTH | Facility: CLINIC | Age: 63
End: 2023-10-10

## 2023-10-10 VITALS — DIASTOLIC BLOOD PRESSURE: 70 MMHG | SYSTOLIC BLOOD PRESSURE: 108 MMHG | BODY MASS INDEX: 24.79 KG/M2 | WEIGHT: 163 LBS

## 2023-10-10 DIAGNOSIS — F10.20 ALCOHOL USE DISORDER, SEVERE, DEPENDENCE (H): Primary | ICD-10-CM

## 2023-10-10 DIAGNOSIS — F10.90 ALCOHOL USE DISORDER: Primary | ICD-10-CM

## 2023-10-10 DIAGNOSIS — F41.1 GENERALIZED ANXIETY DISORDER: ICD-10-CM

## 2023-10-10 DIAGNOSIS — G47.00 INSOMNIA, UNSPECIFIED TYPE: ICD-10-CM

## 2023-10-10 PROCEDURE — 99214 OFFICE O/P EST MOD 30 MIN: CPT | Mod: VID | Performed by: NURSE PRACTITIONER

## 2023-10-10 PROCEDURE — H2035 A/D TX PROGRAM, PER HOUR: HCPCS

## 2023-10-10 RX ORDER — NALTREXONE HYDROCHLORIDE 50 MG/1
50 TABLET, FILM COATED ORAL DAILY
Qty: 30 TABLET | Refills: 0 | Status: SHIPPED | OUTPATIENT
Start: 2023-10-10 | End: 2023-11-20

## 2023-10-10 ASSESSMENT — ANXIETY QUESTIONNAIRES
1. FEELING NERVOUS, ANXIOUS, OR ON EDGE: MORE THAN HALF THE DAYS
GAD7 TOTAL SCORE: 8
4. TROUBLE RELAXING: SEVERAL DAYS
3. WORRYING TOO MUCH ABOUT DIFFERENT THINGS: SEVERAL DAYS
2. NOT BEING ABLE TO STOP OR CONTROL WORRYING: SEVERAL DAYS
GAD7 TOTAL SCORE: 8
7. FEELING AFRAID AS IF SOMETHING AWFUL MIGHT HAPPEN: MORE THAN HALF THE DAYS
5. BEING SO RESTLESS THAT IT IS HARD TO SIT STILL: NOT AT ALL
6. BECOMING EASILY ANNOYED OR IRRITABLE: SEVERAL DAYS
IF YOU CHECKED OFF ANY PROBLEMS ON THIS QUESTIONNAIRE, HOW DIFFICULT HAVE THESE PROBLEMS MADE IT FOR YOU TO DO YOUR WORK, TAKE CARE OF THINGS AT HOME, OR GET ALONG WITH OTHER PEOPLE: SOMEWHAT DIFFICULT

## 2023-10-10 ASSESSMENT — PAIN SCALES - GENERAL: PAINLEVEL: MILD PAIN (3)

## 2023-10-10 NOTE — PATIENT INSTRUCTIONS
Patient Education   Addiction Medicine  What to Expect  Here's what to expect from our Addiction Medicine program.  About Addiction Medicine  Addiction Medicine clinics help you with substance use problems. You set your own goals. We try to help you reach your goals. Your care plan can include:  Medicine  Creating a recovery plan  Helping you find local resources  Helping with treatment options  Clinic phone number and addresses  Clinic Phone: [phone]  Mental Health and Addiction Clinic  [facility]  [address], [address]  [place] [address]  [facility]  [address], [address]  [place] [address]  Walk-in services  We offer walk-in care for patients at the Recovery Clinic. This is only for patients with Opioid Use Disorder (OUD). Anyone with OUD is welcome. Our providers will refer you to the Recovery Clinic if you're struggling to keep up with your medicines or appointments.  Recovery Clinic ([facility])  [address], [address]  [place] [address]  Phone: [phone]  The Recovery Clinic is open for walk-ins Monday to Friday 9 a.m. to 11:30 a.m. and 12:30 p.m. to 3 p.m.  How it works  Come to your visits every time. The treatment works better when you do.   You can have as many visits as you need. When you're better, we'll refer you back to being cared for by your family doctor.   If you need it, we'll send you to doctors, psychiatrists, therapists, and other providers. We focus on treating addiction. We don't treat other problems, like managing other medicines or non-addiction issues.  About visits  Urine drug testing  We'll often test your pee (urine) for drugs. This is the only way we can know for sure whether or not you're using drugs. It helps us treat you without judgement.   Suboxone (buprenorphine)  If you're taking buprenorphine, you'll have a lot of visits at first. If your problem is getting worse, or you're  "using substances, we may schedule you for extra visits.   Cancelling visits  If you can't come to your visit, please call us right away at [phone]. If you don't cancel at least 24 hours (1 full day) before your visit, that's \"late cancellation.\"  Being late to visits  If you come late, you may not be seen. This will count as a \"no-show.\"  Please call the clinic if you're running late. This will help us plan, but it doesn't mean you'll be seen.   Being late is:  More than 15 minutes late for a return visit.  More than 30 minutes late for your first visit.  If you cancel late or don't show up 2 times within 6 months, we may transfer you to another clinic.   Getting help between visits  If you need help between visits, you can call us Monday to Friday from 8 a.m. to 4:30 p.m. at [phone]. You can also send us a message on [employer].  Medicine refills  If you miss or cancel a visit, you can still ask for a refill. But we can only refill your medicines if you've made a new appointment.  Please call your pharmacy for medicine refills. If you have a question about your refill, call us at [phone].  It takes up to 2 business days to refill your drugs. Let us know 2 to 3 days before you run out. Don't call more than 1 week before you run out. That's too early.   Please make sure we have your right phone number.  If we have a problem with your refill, we'll call you. If we call you, please call us back right away. If you don't, you may not get your medicines quickly.   Call your pharmacy to find out if your medicines are ready.   Keep your medicines in a safe place. Keep them away from pets and children. If your medicines are lost or stolen, we usually don't replace them. We recommend you file a police report if your medicines are stolen. Your insurance may not pay for early refills, even if you have a prescription.  Forms  Please give us at least 3 business days to fill out any forms. Bring the forms to your " visits if you can. We may refer you to other members of your care team to complete the forms.   Emergency care   Call 911 or go to the nearest emergency room if your life or someone else's life is in danger.  Call 988 anytime for the Suicide and Crisis Lifeline.  If you need care when we're closed, call your family doctor to see if they can help. You can also go to urgent care or an emergency room. [facility] emergency rooms may be able to give you buprenorphine or other medicine refills.  Thank you for choosing us for your care.  For informational purposes only. Not to replace the advice of your health care provider. Copyright   2023 [facility]. All rights reserved. [employer] [id number] - REV 05/23.

## 2023-10-10 NOTE — PROGRESS NOTES
Virtual Visit Details    Type of service:  Video Visit   Video Start Time:  0923  Video End Time: 0939    Originating Location (pt. Location): [facility]    Distant Location (provider location):  On-site  Platform used for Video Visit: [facility]    A/P                                                    ASSESSMENT/PLAN  Diagnoses and all orders for this visit:  Alcohol use disorder, severe, dependence (H)  Jerad reports cravings are controlled with naltrexone 50 mg daily. He will be starting IOP through [facility] next week.   Continue naltrexone without changes.   -Reinforced plans to start IOP  -Encouraged him  to continue to attend AA, and adding in person meetings once he is home from [facility].   -Continue meetings with sponsor and step work.   -     naltrexone (DEPADE/REVIA) 50 MG tablet; Take 1 tablet (50 mg) by mouth daily  Insomnia, unspecified type  Generalized anxiety disorder  Symptoms improved with hydroxyzine at bedtime. No longer getting during the day for anxiety. Has follow up with psych next to discuss.         Orders Placed This Encounter   Medications    naltrexone (DEPADE/REVIA) 50 MG tablet     Sig: Take 1 tablet (50 mg) by mouth daily     Dispense:  30 tablet     Refill:  0       Problem list updated Oct 10, 2023   No problems updated.          PDMP Review         Value Time User    State PDMP site checked  Yes 9/5/2023  1:48 PM Sarahy Mullins CNP              RTC  Return in about 17 days (around 10/27/2023) for Follow up, with me, using a video visit 1100.      Counseled the patient on the importance of having a recovery program in addition to medication to manage recovery.  Components include avoiding isolating, having willingness to change, avoiding triggers and managing cravings. Encouraged having some type of sober network and practicing honesty with trusted support person(s). Encouraged other services such as counseling, 12 step or other self-help organizations.   "    Opioid warning reviewed.  Risk of overdose following a period of abstinence due to decrease tolerance was discussed including risk of death.  Strongly recommended abstain from alcohol, benzodiazepines, THC, opioids and other drugs of abuse.  Increased risk of return to opioid use after use of these substances discussed.  Increased risk of overdose/death with use of other substances particularly benzodiazepines/alcohol reviewed.        SUBJECTIVE                                                      Jerad Ross is a 63 year old male with history of HTN, JULIANE, , CAD s/p CABG x3 9/2020, depression with anxiety, and alcohol use who presents for further evaluation of possible substance use disorder and management options.     Brief history: Initial visit 8/14/23. Jerad  is requesting a chemical health assessment to see if he \"should go to treatment of if AA is enough\". He reports that he began drinking 8 shots of whiskey 1-3 times weekly for past 4 months. States his depression and anxiety as well as opiate withdrawal triggered use escalating use. Has been prescribed opioid pain medication on/off since 3/2023 due to right hip pain 2/2 avascular necrosis which was revised 6/2023 and admits to taking more than prescribed on occasion and states that he realized he was taking them sometimes due to his emotions versus physical pain. He has not taken an opioid pain medication for past 3 weeks. States if he ever requires opioid pain medication in future, prefers to be tapered off.      He had a period of 4 years 1846-8447 of total abstinence from alcohol States he went to treatment for \"sexual compulsivity\" followed by a \"sober house\" and he just maintained abstinence after leaving sober house. States he went to sober house because he needed somewhere to go after leaving the [facility]. He is attending a 12 step group for his sexual compulsions. Has recently started attending AA as well.      Remote history of renal failure as " "an adolescent. Was on dialysis at age 16, and s/p kidney transplant at age 18. States he contracted hepatitis B from dialysis,      Substance Use History:   \"Have you ever had any history with [...] use?\" And \"When was your last use?  ALCOHOL - Drinks 8 shots of whiskey 1-3 times weekly for past 4 months, last drink 8/5/23.   CANNABIS - Denies  PRESCRIPTION STIMULANTS (includes Ritalin, Adderall, Vyvanse) - Denies  COCAINE/CRACK - Denies  METH/AMPHETAMINES (includes ecstacy, MDMA/connie) - Denies  OPIATES - Prescribed opiate pain medication on/off since 3/2023 due to right hip pain with revision 6/19/2023. States he took as prescribed mostly, but does admit to taking more than  prescribed on a few occasions. He needs to be tapered off of opioids in future as he did experience opiate w/d and began drinking to help with symptoms. No longer has prescription for opioids.   BENZODIAZEPINES (includes Ativan, Klonopin, Xanax) - Denies  KRATOM (mild opioid and stimulant effects) - Denies  KETAMINE - Denies  HALLUCINOGENS (includes DXM) - Denies  BEHAVIORAL (Gambling, Eating d/o, Compulsivity) - Sexual compulsivirty  History of treatment - Yes the [facility], \"sober house\", and 12 step  NICOTINE  Cigarettes: Denies           Previous withdrawal treatment episodes (e.g. detox): Denies  Previous ROBERT treatment programs: Denies  Hospitalizations or overdose: Denies  Medical complications from substance use: Hx of  renal failure on dialysis at age 16, and kidney transplant, infected with hepatitis B from dialysis. Alcohol impacts his liver.   IV Drug use?: Denies  Previous Medication for Addiction Tx: Denies  Longest period of full abstinence: Almost 4 years 8581-6972  Activities that have previously supported abstinence: Some AA  Current Recovery Activities: AA    Recent HPI Details:9/19/23  Finished PT and OT. Still in TCU until end of week  Completed the robert eval 9/12, inpatient  at [facility] was recommended, but they did not " "accept him. Now looking into IOP at , but is wanting to go to [place] to visit his mother/sister   Wanted to go to [place] \"as soon as possible\", and stay for the Winter.   Denies cravings, is experiencing some irritability.  Is attending some AA meetings online. Plans to go to meeting in person once he is discharged from TCU.  Has a sponsor.   Started melatonin at [facility] which is helping some although he did not sleep well last night. Would like an increase to his hydroxyzine at bedtime.   Alcohol use disorder, severe, dependence (H)  Patient reports that his cravings are controlled while at the TCU. He will be discharging home later this week. He is taking naltrexone 50 mg daily, denies SE.   -Continue naltrexone without changes.   -Encouraged plans for IOP.  Lash[facility] eval was completed 9/12.   -Encouraged AA attendance.   -     naltrexone (DEPADE/REVIA) 50 MG tablet; Take 1 tablet (50 mg) by mouth daily  Insomnia, unspecified type  Generalized anxiety disorder  Reporting difficulty sleeping at night, current prescribed hydroxyzine 10 mg TID bt his PCP. Is requesting to have the option to take a higher dose at bedtime. Patient [facility] that [facility] start taking before he is discharge. Verbal permission received to contact the nurse at the [facility] to get the pharmacy information and to update of the medication change.   --     hydrOXYzine (ATARAX) 25 MG tablet; Take 1 tablet (25 mg) by mouth nightly as needed for anxiety (sleep)       TODAY'S VISIT  HPI Oct 10, 2023    -Still in TCU, may be discharging home on Wednesday once PCA services have been arranged.   -Completed [facility] eval, and will be starting [facility] co-occurring IOP next week.  -Meeting with sponsor weekly and attending online AA meetings  -Taking hydroxyzine 25 mg at bedtime, sleeping better   -Taking naltrexone 50 mg, denies alcohol cravings. Is experiencing fear that he will drink once he gets home.   -Will " be doing PT at [facility]  -Going to [place] in December to spend the Winter with family. Plans to return to [place] in April.     OBJECTIVE                                                    PHYSICAL EXAM:  /70   Wt 73.9 kg (163 lb)   BMI 24.79 kg/m      GENERAL: healthy, alert and no distress  EYES: Eyes grossly normal to inspection, PERRL and conjunctivae and sclerae normal  RESP: No respiratory distress  MENTAL STATUS EXAM  Appearance/Behavior: No appearant distress and Disheveled  Speech: Normal  Mood/Affect: normal affect  Insight: Adequate    LAB  No results found for any visits on 10/10/23.      HISTORY                                                    Problem list reviewed & adjusted, as indicated.  Patient Active Problem List   Diagnosis    BCC (basal cell carcinoma), trunk    JULIANE (obstructive sleep apnea)    HTN, kidney transplant related    Coronary arteriosclerosis due to lipid rich plaque    NSTEMI (non-ST elevated myocardial infarction) (H)    Depression    Diverticulosis    Hemorrhoids    Kidney replaced by transplant    Basal cell carcinoma    Squamous cell carcinoma    Dyslipidemia    CRP elevated    Glaucoma    AION (acute ischemic optic neuropathy)    Paracentral scotoma    Hip pain    Inflamed seborrheic keratosis    Intertrigo    Chronic hepatitis B (H)    Immunosuppression (H24)    Hypogonadism in male    GERD (gastroesophageal reflux disease)    Aftercare following organ transplant    Acute midline low back pain without sciatica    Septic bursitis    Impaired mobility    CAD -- S/P CABG x 3 in 2020    Abnormal cardiovascular stress test    HTN (hypertension)    End stage renal disease (H)    Hip pain, right    Avascular necrosis of bones of both hips (H)    Chest pain, Probably chest wall in origin    Preoperative examination    Coronary artery disease of native artery of native heart with stable angina pectoris (H24)    Failed total hip arthroplasty, sequela    Generalized muscle weakness     Generalized anxiety disorder    Transient hypotension    Hypokalemia    Alcohol use disorder, severe, dependence (H)    Hypoxia    Multiple subsegmental pulmonary emboli without acute cor pulmonale (H)    Hypomagnesemia    General weakness    Unable to ambulate    Nonadherence to medication         MEDICATION LIST (prior to visit)  acetaminophen (TYLENOL) 500 MG tablet, Take 1,000 mg by mouth 3 times daily as needed for mild pain  albuterol (PROAIR HFA) 108 (90 Base) MCG/ACT inhaler, Inhale 2 puffs into the lungs every 6 hours as needed for shortness of breath / dyspnea or wheezing  aspirin 81 MG EC tablet, Take 1 tablet (81 mg) by mouth daily  budesonide (PULMICORT FLEXHALER) 90 MCG/ACT inhaler, Inhale 2 puffs into the lungs 2 times daily as needed (shortness of breath)  entecavir (BARACLUDE) 0.5 MG tablet, Take 1 tablet (0.5 mg) by mouth daily  FLUoxetine (PROZAC) 20 MG capsule, Take 1 capsule (20 mg) by mouth daily With 40mg for a total daily dose of 60mg  FLUoxetine (PROZAC) 40 MG capsule, Take 1 capsule (40 mg) by mouth daily  fluticasone-salmeterol (ADVAIR) 250-50 MCG/ACT inhaler, Inhale 1 puff into the lungs 2 times daily  furosemide (LASIX) 20 MG tablet, Take 1 tablet (20 mg) by mouth daily as needed (fluid retention)  hydrOXYzine (ATARAX) 10 MG tablet, Take 1 tablet (10 mg) by mouth every 8 hours as needed for anxiety  hydrOXYzine (ATARAX) 25 MG tablet, Take 1 tablet (25 mg) by mouth nightly as needed for anxiety (sleep)  isosorbide mononitrate (IMDUR) 60 MG 24 hr tablet, Take 60 mg by mouth daily  latanoprost (XALATAN) 0.005 % ophthalmic solution, Place 1 drop into both eyes At Bedtime  metoprolol succinate ER (TOPROL XL) 25 MG 24 hr tablet, Take 0.5 tablets (12.5 mg) by mouth daily  Multiple Vitamins-Iron (ONE DAILY MULTIVITAMIN/IRON) TABS, Take 1 tablet by mouth daily  mycophenolate (GENERIC EQUIVALENT) 250 MG capsule, Take 3 capsules (750 mg) by mouth 2 times daily  nitroGLYcerin (NITROSTAT) 0.4 MG  sublingual tablet, Place 1 tablet (0.4 mg) under the tongue every 5 minutes as needed for chest pain  Omega-3 Fatty Acids (OMEGA 3 PO), Take 1 capsule by mouth daily Dose unknown  pantoprazole (PROTONIX) 40 MG EC tablet, Take 1 tablet (40 mg) by mouth daily  polyethylene glycol (MIRALAX) 17 g packet, Take 17 g by mouth daily as needed   predniSONE (DELTASONE) 5 MG tablet, Take 1 tablet (5 mg) by mouth daily  Rivaroxaban ANTICOAGULANT 15 & 20 MG TBPK Starter Therapy Pack, Take 15 mg by mouth 2 times daily (with meals) for 21 days, THEN 20 mg daily with food for 9 days.  rosuvastatin (CRESTOR) 20 MG tablet, TAKE 1 TABLET(20 MG) BY MOUTH DAILY  tacrolimus (PROTOPIC) 0.1 % external ointment, Apply topically 2 times daily as needed    No current facility-administered medications on file prior to visit.      MEDICATION LIST (after visit)  Current Outpatient Medications   Medication    naltrexone (DEPADE/REVIA) 50 MG tablet    acetaminophen (TYLENOL) 500 MG tablet    albuterol (PROAIR HFA) 108 (90 Base) MCG/ACT inhaler    aspirin 81 MG EC tablet    budesonide (PULMICORT FLEXHALER) 90 MCG/ACT inhaler    entecavir (BARACLUDE) 0.5 MG tablet    FLUoxetine (PROZAC) 20 MG capsule    FLUoxetine (PROZAC) 40 MG capsule    fluticasone-salmeterol (ADVAIR) 250-50 MCG/ACT inhaler    furosemide (LASIX) 20 MG tablet    hydrOXYzine (ATARAX) 10 MG tablet    hydrOXYzine (ATARAX) 25 MG tablet    isosorbide mononitrate (IMDUR) 60 MG 24 hr tablet    latanoprost (XALATAN) 0.005 % ophthalmic solution    metoprolol succinate ER (TOPROL XL) 25 MG 24 hr tablet    Multiple Vitamins-Iron (ONE DAILY MULTIVITAMIN/IRON) TABS    mycophenolate (GENERIC EQUIVALENT) 250 MG capsule    nitroGLYcerin (NITROSTAT) 0.4 MG sublingual tablet    Omega-3 Fatty Acids (OMEGA 3 PO)    pantoprazole (PROTONIX) 40 MG EC tablet    polyethylene glycol (MIRALAX) 17 g packet    predniSONE (DELTASONE) 5 MG tablet    Rivaroxaban ANTICOAGULANT 15 & 20 MG TBPK Starter Therapy Pack     rosuvastatin (CRESTOR) 20 MG tablet    tacrolimus (PROTOPIC) 0.1 % external ointment     No current facility-administered medications for this visit.         Allergies   Allergen Reactions    Penicillins Shortness Of Breath and Hives    Keflex [Cephalexin Hcl] Unknown     Patient could not recall reaction    Sulfa Antibiotics Rash    Tetracycline Unknown     Patient could not recall reaction       At least 30 min spent on day of encounter in review of medical record,  review, obtaining histories, discussing recommendations, counseling/coordination of care    Sarahy Mullins, TABITHA,MSN, AGNP-C  [facility]   [address] [place] [address], [address] [place] [address]   Phone # [phone]

## 2023-10-10 NOTE — PROGRESS NOTES
[facility] Recovery Services  Adult Substance Use Disorder Program  Treatment Plan     These services are provided by the facility for each patient/client according to the individual's treatment plan:  Individual and group counseling  Education  Transition services  Services to address any co-occurring mental illness  Service coordination    Criteria for discharge:  Patients/clients are discharged from the program following completion of the entire program including all phases of treatment or acceptance of other post-treatment referrals such as sober supportive living, or aftercare at other facilities.  Patients/clients may also be discharged for inappropriate behavior or substance use.    Favorable Discharge - Patients/clients have completed agreed upon treatment goals, understand their diagnosis and appear motivated for continued recovery.  Guarded Discharge - Patients/clients have demonstrated some understanding of their diagnosis and recovery process, and have completed some of their treatment goals.  This prognosis also includes patients/clients who have completed some treatment goals but have not made commitment to community support or follow through with referrals.  Unfavorable Discharge - Patients/clients have not completed agreed upon treatment goals due to their own choice, have limited understanding of their diagnosis, and have shown minimal or inconsistent behavior conducive to recovery.  Those patients/clients discharged due to behavioral problems will also be unfavorable discharges.    Adult ROBERT Treatment Plan                                Patient Name: Jerad Ross  MRN: [id number]  : 1960  63 year old   Identified Gender: male  Admit Date: 10/10/23  Current Treatment Phase: 1  Last Updated: Initial    SUBSTANCE USE DISORDER(S): 303.90 (F10.20) Alcohol Use Disorder Severe      Dimension 1: Acute Intoxication/Withdrawal Potential, Risk Level: 0    Needs identified from Comprehensive  "Assessment Summary: Last alcohol use was on August 5th 2023. Last use of Oxycodone around  the end of July 2023. No withdrawal concerns.   Update: Initial    Date of last use: 8/5/2023-Alcohol   Patient currently receiving medication assisted therapy (MAT): No  Current or recent withdrawal symptoms: No    Focus area: Patient reports lase date of use to be 8/5/2023  Date Assigned: 10/10/2023  Clinical Goal: Patient to maintain abstinence throughout outpatient treatment.   Patient's Goal:  \"Avoid alcohol and non prescribe chemical use\"  Goal needs to be completed prior to discharge? [x]  Treatment Strategies:   Advise counselor(s) of any changes in medications throughout the course of treatment.   Target Date: 1/16/2024  Date Completed:       Dimension 2: Biomedical Conditions/Complications, Risk Level: 1    Needs identified from Comprehensive Assessment Summary: Patient has had hip surgery and due to that he is using a walker while walking. Patient reported pain on his hips and lower back due to the surgery and reports taking over the counter medicine and starting physical therapy.  Patient has a Nephrologist for his kidneys and a Hypnologist for Hepatitis B. Patient is able to access care as needed and has a primary care provider.   Update: Initial    Focus area: Patients has a history and ongoing medical concerns.   Clinical Goal: Address medical concerns as they arise and maintain management of physical health problems.   Patient's Goal:  \"Improve physical strength\"  Goal needs to be completed prior to discharge? []  Methods/Strategies (must include amount and frequency):   1. Jerad will report any changes to physical health to counselor.   2. Jerad will attend all scheduled doctor's appointments.   3. Jerad will take medications as prescribed.   Target Date: 1/16/2024  Completion Date:       Dimension 3: Emotional/Behavioral/Cognitive, Risk Level: 2    Needs identified from Comprehensive Assessment Summary: " "Patient reports diagnosis of depression and anxiety. Patient reports no current psychotherapy at this time. Patient reports no current of SI,SIB, and HI.   Update: Initial    Focus area: Patient reports depression and anxiety    Clinical Goal: Develop coping skills to manage mental health   Patient's Goal:  \"Decrease episodes of depression and anxiety.\"  Goal needs to be completed prior to discharge? []  Methods/Strategies (must include amount and frequency):   1. Patient will meet with Jennifer Holliday once's a week.   2. Patient will attempt weekly mindfulness practices (Breathing, body scan, guided meditation)  3: Assignment(s): None  Target Date: 1/16/2024  Completion Date:       Dimension 4: Readiness to Change, Risk Level: 1    Needs identified from Comprehensive Assessment Summary: Patient is motivated to start treatment and has internal motivation to begin the program.   Update: Initial    Focus area: Patient internal and external motivation    Clinical Goal: Gain insight into how substance has impacted your life and actively engage in the treatment process.  Patient's Goal:  \"Comply with the program requirements\"  Goal needs to be completed prior to discharge? [x]  Methods/Strategies (must include amount and frequency):   1. Jerad will attend 3, 3-hour groups per week. Days/Times: Monday, Wednesday, and Thursday from 9AM-12PM.  2. Jerad will contact counselors if unable to attend Via email.  Target Date: 1/16/2024   Completion Date:       Dimension 5: Relapse/Continued Use/Continued Problem Potential, Risk Level: 3    Needs identified from Comprehensive Assessment Summary: Patient has a couple of months of sobriety. Patient has little  insight to his triggers and minimal coping skills to relapse prevention. Patient is at high risk of relapse potential.   Update: Initial    Focus area: Patient triggers and coping skills.    Clinical Goal: Develop coping skills and relapse prevention strategies to utilize when " "experiencing triggers, cravings, or urges to drink.   Patient's Goal:  \"Develop tool kit  of coping skills.\"  Goal needs to be completed prior to discharge? [x]  Methods/Strategies (must include amount and frequency):   1. Jerad will share during each session's check-in any urges and addictive thinking to better understand their pattern of use and to prevent return to use.  2. Patient to identify personal triggers and high-risk situations for relapse.   3. Assignment(s):  Relapse prevention plan.  Target Date: 1/16/2024  Completion Date:       Dimension 6: Recovery Environment, Risk Level: 1    Needs identified from Comprehensive Assessment Summary: Patient is currently in a transitional care unit and will be discharged in a couple of days to go home. Patient reported when going home he will have a PCA. Patient reported attending AA meetings and finds it helpful in his recovery. Patient is not on probation and is not involved with any legal action.   Update: Initial    Desire for family involvement: No    Focus area/need: Transition Planning to home community or clinically indicated setting  Clinical Goal: Patient, in collaboration with treatment team, will develop a plan for continued support of recovery.  Patient's Goal:  \"Continue AA AND SA group.\"  Goal needs to be completed prior to discharge? [x]  Methods/Strategies (must include amount and frequency):   1. Patient will attend sober support groups in the community.  4. Assignment(s): None  Target Date: 1/16/2024  Completion Date:     Resources  Resources to which the patient is being referred for problems when they are to be addressed concurrently by another provider: No Referrals Needed    [x] Contact insurance to ensure referrals are in network    Vulnerable Adult Review   [] Review of the facility Abuse Prevention plan was reviewed with the patient   [] No individual abuse plan is necessary   [] In addition to the facility Abuse Prevention plan, an " Individual Abuse Plan will be put in place     Jerad attests their date of last use as 8/5/2023. Jerad attests they have participated in the creation of this treatment plan. Jerad has been provided a copy of this treatment plan and is in agreement with how this plan is written and will be stored in the electronic record.       Patient Signature: _________________________________ Date: _________    Counselor Signature: ______________________________ Date: _________

## 2023-10-10 NOTE — NURSING NOTE
Is the patient currently in the state of [place]? YES    Visit mode:VIDEO    If the visit is dropped, the patient can be reconnected by: VIDEO VISIT: Text to cell phone:   Telephone Information:   Mobile [phone]       Will anyone else be joining the visit? No  (If patient encounters technical issues they should call [phone])    How would you like to obtain your AVS? MyChart    Are changes needed to the allergy or medication list? Pt stated no changes to allergies and Pt stated no med changes    Rooming Documentation: Assigned questionnaire(s) completed .    Reason for visit: JEREMY Grayson

## 2023-10-10 NOTE — PROGRESS NOTES
"Client:  Jerad Ross  MRN: [id number]    10/10/2023  START TIME: 11:10 AM   END TIME: 12:15 PM   Duration: 1 Hour and 5 min    Visit Purpose:  Patient shared that he has depression, anxiety and emanuel with alcohol.      The patient met with this writer for both an individual session and service initiation.     We discussed the impact of their substance use on the different areas of their life, and the patient identified which areas are a priority for them.  The patient shared what they have been doing between the initial comprehensive assessment date and today's appointment.  This writer suggested several coping skills that the patient can begin using immediately.  The patient also shared their individual motivation for recovery during this session.      Any assessment updates are noted below.      Comprehensive Assessment UPDATE/ISP/VAA/[place] Re-Assess     Comprehensive Assessment Update: 10/10/2023     Comprehensive assessment dated 9/12/2023 was reviewed and updates are as follows:   No changes since Comprehensive Assessment.    Reason for visit today: \"I do need that extra help, I have had a long history of depression, anxiety and  physical health issues. Post surgery I  was given oxycodone and the withdrawals where bad so I started drinking so I could check out\".     Dates of last use and substance(s) used:  August 5th for alcohol and \"Oxycodone \"sometime of the end of July\".    Patient  \"2 1/2 months ago of oxycodone\" that was prescribed for his hip revision  \"I took more than I should have and my withdrawls where bad so I started drinking\"    Safety concerns:  None        Other:  None    Health Screening:  Given patient's past history, a medication, and physical condition, is there a fall risk?  Yes - please explain: \"post surgery\"  Does the patient have any pain? Yes -  Pain rating: 3/10      Describe pain:  Word Description: HIP AND BACK LOWER \"DEEP ACHE\"        When did it first begin?: \"Couple " "years\"  How long does each episode last?: all day but sitting helps  What causes or worsens it?:  Therapy exsersizes   What relieves or lessens it?:  Reasting   Would like this pain addressed during your stay:  NO \"I am getting new shoes and going to start therapy\"  Staff have requested client inform staff of any new or different pain issue(s) that arise during their treatment stay: Yes:   Is the patient on a special diet? If yes, please explain: no  Does the patient have any concerns regarding your nutritional status? If yes, please explain: no  Has the patient had any appetite changes in the last 3 months?  Yes, \"less appetite I think it is from the Naltrexone \"  Has the patient had any weight loss or weight gain in the last 3 months? Yes, how much? \"10 pounds\"  Has the patient have a history of an eating disorder or been over-eating, avoiding meals, or inducing vomiting?  Yes \"used it as comfort when I was younger but I do not have any issues currently\"    Does the patient have any dental concerns? (Problems with teeth, pain, cavities, braces)?  NO  Are immunizations up to date?  Yes  Any recent exposure to TB, Hepatitis, Measles, or Strep?  No \"I do have Heptities B\" -  Patient has a prescription to manage symptoms  and has checks up with his PCP.  Brief Biosocial Gambling Screening:  Do you have any current or past concerns regarding gambling?  no  If YES:  Have you ever participated in a gambling treatment program?  no  During the past 12 months, have you become restless, irritable or anxious when trying to stop/cut down on gambling?  no  During the past 12 months, have you tried to keep your family or friends from knowing how much you gambled?  no  During the past 12 months, did you have such financial trouble, as a result of your gambling, that you had to get help with living expenses from family, friends, or welfare?  no      Dimension Scale Ratings:    Dimension 1: 0 Client displays full functioning with " "good ability to tolerate and cope with withdrawal discomfort. No signs or symptoms of intoxication or withdrawal or resolving signs or symptoms.  Dim 1 Narrative:  Patient shows no withdrawal potential during Intake. Patient date of last use of alcohol is on August 5th 2023 and Oxycodone the \"end of July\"    Dimension 2: 1 Client tolerates and emanuel with physical discomfort and is able to get the services that the client needs.  Dim 2 Narrative:  Patient has a pain of 3/10 due to his hip surgery. Patient shared that if his pain gets worse he can follow up with his PCP. Patient uses a walker to get around. Patient has a Nephrologist for his kidneys and a Hypnologist for Hepatitis B. Patient has no current concern and will reach out to his providers when needed.    Dimension 3: 2 Client has difficulty with impulse control and lacks coping skills. Client has thoughts of suicide or harm to others without means; however, the thoughts may interfere with participation in some treatment activities. Client has difficulty functioning in significant life areas. Client has moderate symptoms of emotional, behavioral, or cognitive problems. Client is able to participate in most treatment activities.  Dim 3 Narrative:  Patient reports depression and anxiety are a big factor of his mental health. 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 Patient reports no current therapy services.  Patient reports no current SI, SIB, VI, and HI.     Dimension 4: 1 Client is motivated with active reinforcement, to explore treatment and strategies for change, but ambivalent about illness or need for change.  Dim 4 Narrative:  Patient is in the preparation stage of change. Patient shared he is wanting to get help and be in treatment.    Dimension 5: 3 Client has poor recognition and understanding of relapse and recidivism issues and displays moderately high vulnerability for further substance use or mental health " "problems. Client has few coping skills and rarely applies coping skills.  Dim 5 Narrative:  Patient lacks coping skills and relapse prevention skills and would benefit from this program. Patient is at high risk for relapse.     Dimension 6: 1 Client has passive social  or family and significant other are not interested in the client's recovery. The client is engaged in structured meaningful activity.  Dim 6 Narrative:  Patient has a supportive family and friends. Patient reported being involved with AA and goes to meetings. Patient is currently in a [facility] care unit however he reported being discharged this week and going back home. Patient reported once he is home he will have a PCA.  Patient does not have probation or any legal issues.       Initial Service Plan (ISP)    Immediate health, safety, and preliminary service needs identified and plan includes the following based on available information from clients, referral sources, and collateral information.    Safety (SI, SIB, suicide attempts, aggressive behaviors):  History of SI (suicidal ideation)?  Yes- \"I went into the hospital to get help\"  History of SIB (self-injurious behavior)?  Skin picking- \"I have been doing good on not doing that lately\"  History of VI (violent ideation)?  Sexual- sadistic, \"I am in a sex program\"  History of HI (homicidal ideation)?  No      Recommended that patient call 911 or go to the local ED should there be a change in any of these risk factors.       Name: Jerad Ross  YOB: 1960  Date: 10/10/2023  My primary care provider: Dr. Aston Perry    My primary care clinic: [address] [place] [address]   My prescriber: PCP  Other care team support:  TCU  Nephrologist for his kidneys    Hypaologist for Hepatitis B    My Triggers (check the ones that apply to you):   Relationship Conflict-yes  Work Difficulties- No  School Concerns- No  Home Environment- No  Social Support- No  Peer Relationships- No  Financial " Concerns- No  Housing Concerns- No  Medical/Health- Yes  Substance Use-No  Legal Difficulties- No       Additional People, Places, and Things that I can access for support:     Sister, AA, SA, Mom, friends, and brother.     What is important to me and makes life worth living:          GREEN    Good Control  1. I feel good  2. No suicidal thoughts   3. Can work, sleep and play      Action Steps  1. Self-care: balanced meals, exercising, sleep practices, etc.  2. Take your medications as prescribed.  3. Continue meetings with therapist and prescriber.  4.  Do the healthy things that I enjoy.                   YELLOW  Getting Worse  I have ANY of these:  1. I do not feel good  2. Difficulty Concentrating  3. Sleep is changing  4. Increase/Change in my thoughts to hurt self and/or others, but I can still manage and not act on it.   5. Not taking care of self.                 Action Steps (in addition to the above):  1. Inform your therapist and psychiatric prescriber/PCP.  2. Keep taking your medications as prescribed.    3. Turn to people you can ask for help.  4. Use internal coping strategies -see below.  5. Create safe environment:        -Store firearms for safety       -Lock and limit medications       -Notify friends/family of increase in symptoms                  RED  Get Help  If I have ANY of these:  1. Current and uncontrollable thoughts and/or behaviors to hurt self and/or others.      Actions to manage my safety  1.Contact your emergency person:   -Name:  -Number:  2. Call or Text 101  3. Call my crisis team-         -County:        -Number:  3. Or Call 911 or go to the emergency room right away                 My Internal Coping Strategies include the following ([place] ones you use):  Take a bath  Blow bubbles  Belly breathing  Arts & Crafts  Coloring  Chew gum  Fidget toys  Safe space:  Time with pets  Coping toolbox  Temperature change  Exercise      Safety Concerns  How To Identify Situations That Make  Your Mental Health Worse:  Triggers are things that make your mental health worse.  Look at the list below to help you find your triggers and what you can do about them.     1. Identify Early Warning Signs:    Sometimes symptoms return, even when people do their best to stay well. Symptoms can develop over a short period of time with little or no warning, but most of the time they emerge gradually over several weeks.  Early warning signs are changes that people experience when a relapse is starting. Some early warning signs are common and others are not as common.   Common Early Warning Signs:   Feeling tense or nervous  Eating less or more  Trouble sleeping  Feeling depressed or low  Feeling irritable  Isolating  Trouble concentrating  Urges to harm self  Urges to harm others        2. Identify action steps to take when warning signs are noticed:    Taking Action- It is important to take action if you are experiencing early warning signs of a relapse.  The faster you act, the more likely it is that you can avoid a full relapse.  It is helpful to identify several specific ways to cope with symptoms.      The following is my list of symptoms and coping strategies that I can use when they are present:    Symptom Coping Strategies   Anxiety -Talk with someone in your support system and let him or her know how you are feeling.  -Use relaxation techniques such as deep breathing or imagery.  -Use positive affirmations to counteract negative self-talk such as  I am learning to let go of worry.    Depression - Schedule your day; include activities you must do and activities you enjoy doing.  - Get some exercise - walk, run, bike, or swim.  - Give yourself credit for even the smallest things you get done.   Sleep Difficulties   - Go to sleep at the same time every day.  - Do something relaxing before bed, such as drinking herbal tea or listening to music.  - Avoid having discussions about upsetting topics before going to  "bed.   Delusions   - Distract yourself from the disturbing thought by doing something that requires your attention such as a puzzle.  - Check out your beliefs by talking to someone you trust.    Hallucinations   - Use headphones to listen to music.  - Tell voices to  stop  or say to yourself,  I am safe.   - Ignore the hallucinations as much as possible; focus on other things.   Concentration Difficulties - Minimize distractions so there is only one thing for you to focus on at a time.    - Ask the person you are having a conversation with to slow down or repeat things you are unsure of.      Patient consented to co-developed safety plan.  Safety and risk management plan was completed.  Patient agreed to use safety plan should any safety concerns arise.  A copy was given to the patient.     Health:  Client does NOT have health issues that would impede participation in treatment    Transportation: Client will be transported to treatment by [facility] with provide transformation..     Other:  None    Patient does not have any identified barriers to participating in referred services.    Vulnerable Adult Assessment    Does the patient possess a physical or mental infirmity or other physical, mental, or emotional dysfunction?  No. Patient is not a vulnerable adult.    This patient is not a functional Vulnerable Adult according to [place] Statute 626.5572 subdivision 21.      Treatment suggestions for client for the time period until the initial treatment planning session:   1.  Abstain from all mood  alternating substances unless proscribes from PCP.  2. Meet with Jennifer  for DA on 10/17/23 at 2:30 PM  3. Attend first group session on 10/18/23 at 9 AM    [place] Re-Assessment:     Have you ever wished you were dead or that you could go to sleep and not wake up? Lifetime?  Yes.  Describe \"I was not taking care of myself and I needed help\"   Past Month?  Yes.  Describe \"this happened after my surgery      Have you " "actually had any thoughts of killing yourself?  Lifetime?  Yes.  Describe \"Yes I had thoughts, I have depression\"   Past Month?  Yes.  Describe \"     Have you been thinking about how you might do this? Lifetime?  No   Past Month?  N/A     Have you had these thoughts and had some intention of acting on them?  Lifetime?  No   Past Month?  N/A     Have you started to work out the details of how to kill yourself?  Lifetime?  No   Past Month?  No     Do you intend to carry out this plan?   No     When you have the thoughts how long do they last?   N/A \"Patient does not know\"    Are there things - anyone or anything (ie Family, Amish, pain of death) that stopped you from wanting to die or acting on thoughts of suicide?   Protective factors definitely stopped you from attempting suicide        2008  [employer]  Used with permission by Veronica Acosta, PhD.      JAMIE Marin       10/10/2023     11:28 AM   "

## 2023-10-10 NOTE — PROGRESS NOTES
"Comprehensive Assessment Summary     Based on client interview, review of previous assessments and   comprehensive assessment interview the following diagnosis and recommendations are:     Patient: Jerad Ross  MRN; [id number]   : 1960  Age: 63 year old Sex: male       Client meets criteria for:  303.90 Alcohol Dependence    Dimension One: Acute Intoxication/Withdrawal Potential     Rating:      (Consider the client's ability to cope with withdrawal symptoms and current state of intoxication)  0-Patient shows no withdrawal potential during Intake. Patient date of last use of alcohol is on 2023 and Oxycodone the \"end \"     Dimension Two: Biomedical Condition and Complications    Rating:    (Consider the degree to which any physical disorder would interfere with treatment for substance abuse, and the client's ability to tolerate any related discomfort; determine the impact of continued chemical use on the unborn child if the client is pregnant)   1-Patient has a pain of 3/10 due to his hip surgery. Patient shared that if his pain gets worse he can follow up with his PCP. Patient uses a walker to get around. Patient has a Nephrologist for his kidneys and a Hypnologist for Hepatitis B. Patient has no current concern and will reach out to his providers when needed     Dimension Three: Emotional/Behavioral/Cognitive Conditions & Complications  Rating:    (Determine the degree to which any condition or complications are likely to interfere with treatment for substance abuse or with functioning in significant life areas and the likelihood of risk of harm to self or others)   2-Patient reports depression and anxiety are a big factor of his mental health. 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 Patient reports no current therapy services. Patient reports no current SI, SIB, VI, and HI.     Dimension Four: Treatment Acceptance/Resistance     Rating:  "   (Consider the amount of support and encouragement necessary to keep the client involved in treatment)   1- Patient is in the preparation stage of change. Patient shared he is wanting to get help and be in treatment.     Dimension Five: Continued Use/Relaspe Prevention     Rating:    (Consider the degree to which the client's recognizes relapse issues and has the skills to prevent relapse of either substance use or mental health problems)   3- Patient lacks coping skills and relapse prevention skills and would benefit from this program. Patient is at high risk for relapse     Dimension Six: Recovery Environment     Rating:     (Consider the degree to which key areas of the client's life are supportive of or antagonistic to treatment participation and recovery)   1- Patient has a supportive family and friends. Patient reported being involved with AA and goes to meetings. Patient is currently in a [facility] care unit however he reported being discharged this week and going back home. Patient reported once he is home he will have a PCA. Patient does not have probation or any legal issues.     I have reviewed the information on the assessment, psychosocial and medical history and checklist:  Danny Diamond ADC-T 3:16 PM 10/10/23

## 2023-10-11 ENCOUNTER — DOCUMENTATION ONLY (OUTPATIENT)
Dept: BEHAVIORAL HEALTH | Facility: CLINIC | Age: 63
End: 2023-10-11

## 2023-10-11 DIAGNOSIS — F33.41 RECURRENT MAJOR DEPRESSIVE DISORDER, IN PARTIAL REMISSION (H): ICD-10-CM

## 2023-10-11 RX ORDER — HYDROXYZINE HYDROCHLORIDE 10 MG/1
10 TABLET, FILM COATED ORAL EVERY 8 HOURS PRN
Qty: 60 TABLET | OUTPATIENT
Start: 2023-10-11

## 2023-10-11 NOTE — TELEPHONE ENCOUNTER
Medication requested: hydrOXYzine (ATARAX) 10 MG tablet   Take 1 tablet (10 mg) by mouth every 8 hours as needed for anxiety - Oral  Last refilled: 9-13-23  Qty: 60      Last seen: 9-28-23  RTC: 3 weeks  Cancel: 0  No-show: 0  Next appt: 10-16-23    Last clinic note:  1) he chooses to continue Prozac 60mg daily, hydroxyzine HCL 25mg at bed PRN    Also on current med list:  hydrOXYzine (ATARAX) 25 MG tablet 90 tablet 0 9/28/2023  No   Sig - Route: Take 1 tablet (25 mg) by mouth nightly as needed for anxiety (sleep) - Oral       Refill decision:   FYI to clinic: last clinic note does not match med list.   ? Continue with 10 mg

## 2023-10-11 NOTE — TELEPHONE ENCOUNTER
----- Message from JAMIE Marin sent at 10/10/2023  3:33 PM CDT -----  Scheduling Request    Patient Name: Jerad Rsos   Location of programming: [place]   Start Date: October / 18 / 2023  Group:  [facility] on Monday, Wednesday, and Thursday at 9:00 AM to 12:00 PM  Attending Provider (MD): Praveena   Number of visits to be scheduled: 20  Duration of Appointment in minutes: 180 min  Visit Type: In-person or Treatment - 870    Additional notes: N/A

## 2023-10-11 NOTE — PROGRESS NOTES
Addiction Outpatient Weekly Clinical Staffing     Jerad Ross was staffed on 10/11/2023 . Jerad Ross was staffed on recovery strengths, barriers and treatment progress.     Staff present: YVETTE Marin    Date: 10/11/2023 Time: 4:02 PM    Staff Signature: JAMIE Marin

## 2023-10-12 ENCOUNTER — DOCUMENTATION ONLY (OUTPATIENT)
Dept: BEHAVIORAL HEALTH | Facility: CLINIC | Age: 63
End: 2023-10-12

## 2023-10-12 NOTE — PROGRESS NOTES
Weekly Progress Note 10/9/23-10/15/23  Jerad Ross  10/12/23  [id number]        D) Pt was not able to attend due to having a SI on 10/9/2023 and his DA on 10/16/2023  R) Unable to assess along six dimensions or for VA due to having his SI/DA.   T) Patient has completed 1 total hours. Patient will be doing his DA with Jennifer Holliday on 10/16/2023     Joellen Diamond, MANOJ-T 10/12/2023 1:39 PM

## 2023-10-16 ENCOUNTER — TELEPHONE (OUTPATIENT)
Dept: PSYCHIATRY | Facility: CLINIC | Age: 63
End: 2023-10-16

## 2023-10-16 ENCOUNTER — VIRTUAL VISIT (OUTPATIENT)
Dept: PSYCHIATRY | Facility: CLINIC | Age: 63
End: 2023-10-16
Attending: NURSE PRACTITIONER
Payer: COMMERCIAL

## 2023-10-16 ENCOUNTER — MYC MEDICAL ADVICE (OUTPATIENT)
Dept: INTERNAL MEDICINE | Facility: CLINIC | Age: 63
End: 2023-10-16

## 2023-10-16 DIAGNOSIS — G47.00 INSOMNIA, UNSPECIFIED TYPE: ICD-10-CM

## 2023-10-16 DIAGNOSIS — F41.1 GENERALIZED ANXIETY DISORDER: Chronic | ICD-10-CM

## 2023-10-16 DIAGNOSIS — F33.41 RECURRENT MAJOR DEPRESSIVE DISORDER, IN PARTIAL REMISSION (H): ICD-10-CM

## 2023-10-16 PROCEDURE — 99442 PR PHYSICIAN TELEPHONE EVALUATION 11-20 MIN: CPT | Mod: 95 | Performed by: NURSE PRACTITIONER

## 2023-10-16 RX ORDER — HYDROXYZINE HYDROCHLORIDE 10 MG/1
10 TABLET, FILM COATED ORAL DAILY PRN
Qty: 30 TABLET | Refills: 1 | Status: SHIPPED | OUTPATIENT
Start: 2023-10-16 | End: 2024-02-05

## 2023-10-16 RX ORDER — HYDROXYZINE HYDROCHLORIDE 25 MG/1
25 TABLET, FILM COATED ORAL
Qty: 90 TABLET | Refills: 0 | Status: SHIPPED | OUTPATIENT
Start: 2023-10-16 | End: 2023-11-17

## 2023-10-16 ASSESSMENT — PAIN SCALES - GENERAL: PAINLEVEL: MODERATE PAIN (5)

## 2023-10-16 NOTE — TELEPHONE ENCOUNTER
Message  Received: Today  Genia Fraser, RONADLO CNP  Yee Claros RN; Cesar Parra, RN; Rafia aBca, RENETTA  Can someone please fax these orders to his TCU ([facility])?    (He is adding the hydroxyzine 10mg to the meds/ doses they already have. )    continue Prozac 60mg daily, hydroxyzine HCL 10mg daily AND 25mg at bed PRN    Their phone is p [phone]), I don't have a fax, I'm sorry.    Genia    Follow up:     - Letter with mediation list drafted as requested by provider     Reached out to [facility] at [phone] to obtain an JAZMINE and fax number. No answer at number provided. Left general voice mail without leaving PHI. Requested a call back. Clinic number provided.

## 2023-10-16 NOTE — LETTER
October 16, 2023       Jerad M Vandana  [address] [place] [address]         To Whom it May concern,     Jerad Ross was seen with Genia Fraser on 10/16 and is recommended to make the following changes to his medications.     - Prozac 60mg daily  - Add hydroxyzine HCL 10mg daily AND 25mg at bed PRN        Sincerely,        Genia Fraser, RONALDO CNP

## 2023-10-16 NOTE — PATIENT INSTRUCTIONS
**For crisis resources, please see the information at the end of this document**   Patient Education    Thank you for coming to the [facility] [place] [facility].     Lab Testing:  If you had lab testing today and your results are reassuring or normal they will be mailed to you or sent through [employer] within 7 days. If the lab tests need quick action we will call you with the results. The phone number we will call with results is # [phone]. If this is not the best number please call our clinic and change the number.     Medication Refills:  If you need any refills please call your pharmacy and they will contact us. Our fax number for refills is [phone].   Three business days of notice are needed for general medication refill requests.   Five business days of notice are needed for controlled substance refill requests.   If you need to change to a different pharmacy, please contact the new pharmacy directly. The new pharmacy will help you get your medications transferred.     Contact Us:  Please call [phone] during business hours (8-5:00 M-F).   If you have medication related questions after clinic hours, or on the weekend, please call [phone].     Financial Assistance [phone]   Medical Records [phone]       MENTAL HEALTH CRISIS RESOURCES:  For a emergency help, please call 911 or go to the nearest Emergency Department.     Emergency Walk-In Options:   [facility] Unit @ [facility] ([place]): [phone] - Specialized mental health emergency area designed to be calming  [facility] ([place]): [phone]  [facility] ([place]): [phone]  [facility]): [phone]    [facility] Crisis Information:   [place]: [phone]  [place]: [phone]  [place] ([place]) - Adult: [phone]     Child: [phone]  [place] - Adult: [phone]     Child: [phone]  [place]:  [phone]  List of all [facility] resources:   https://[place].gov/dhs/people-we-serve/adults/health-care/mental-health/resources/crisis-contacts.jsp    National Crisis Information:   Crisis Text Line: Text  [place]  to 387134  Suicide & Crisis Lifeline: 988  National Suicide Prevention Lifeline: 3-325-612-TALK (1-123.419.7615)       For online chat options, visit https://suicidepreventionlifeline.org/chat/  Poison Control Center: 9-479-083-7446  Trans Lifeline: 8-012-921-6645 - Hotline for transgender people of all ages  [employer]: 6-991-028-6823 - Hotline for LGBT youth     For Non-Emergency Support:   Fast Tracker: Mental Health & Substance Use Disorder Resources -   https://www.My[employer]du[employer]centuryn.org/
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
## 2023-10-16 NOTE — LETTER
October 16, 2023       Jerad M Vandana  [address] [place] [address]         To Whom it May concern,     Jerad Ross was seen with Genia Fraser on 10/16 and is recommended to make the following changes to his medications.     - Prozac 60mg daily  - Add hydroxyzine HCL 10mg daily AND 25mg at bed PRN        Sincerely,        Genia Fraser, RONALDO CNP

## 2023-10-16 NOTE — NURSING NOTE
Is the patient currently in the state of [place]? YES    Visit mode:TELEPHONE    If the visit is dropped, the patient can be reconnected by: TELEPHONE VISIT: Phone number:   Telephone Information:   Mobile [phone]       Will anyone else be joining the visit? NO  (If patient encounters technical issues they should call [phone]    How would you like to obtain your AVS? MyChart    Are changes needed to the allergy or medication list? No  Call Rx into [facility] at Call if needed     Reason for visit: RECHMARY LUNA

## 2023-10-17 ENCOUNTER — HOSPITAL ENCOUNTER (OUTPATIENT)
Dept: BEHAVIORAL HEALTH | Facility: CLINIC | Age: 63
Discharge: HOME | End: 2023-10-17
Attending: FAMILY MEDICINE
Payer: COMMERCIAL

## 2023-10-17 PROCEDURE — 90791 PSYCH DIAGNOSTIC EVALUATION: CPT
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
## 2023-10-17 NOTE — TELEPHONE ENCOUNTER
Reached out to Esther at [facility] to obtain an JAZMINE and fax number. They confirmed a blank JAZMINE can be faxed to [phone] and they will have the patient sign and fax it back. Writer will include the fax number of the clinic to fax the completed JAZMINE back to. Once JAZMINE is received, will fax over a med list.

## 2023-10-18 ENCOUNTER — HOSPITAL ENCOUNTER (OUTPATIENT)
Dept: BEHAVIORAL HEALTH | Facility: CLINIC | Age: 63
Discharge: HOME | End: 2023-10-18
Attending: FAMILY MEDICINE
Payer: COMMERCIAL

## 2023-10-18 ENCOUNTER — TELEPHONE (OUTPATIENT)
Dept: BEHAVIORAL HEALTH | Facility: CLINIC | Age: 63
End: 2023-10-18

## 2023-10-18 DIAGNOSIS — F10.90 ALCOHOL USE DISORDER: Primary | ICD-10-CM

## 2023-10-18 PROCEDURE — H2035 A/D TX PROGRAM, PER HOUR: HCPCS | Mod: HQ

## 2023-10-18 NOTE — TELEPHONE ENCOUNTER
----- Message from Jennifer Holliday, [facility], [facility] sent at 10/17/2023  8:11 PM CDT -----  Regarding: Individual Sessions request (IOP)  Patient Name:  MANSI LIRA [[id number]]  Location of programming: [facility] ([place]  Start Date: 10/26/2023  Day/s of week: Thursdays  Time: 2:00pm  Individual Sessions For OP Group: (MI/[facility] Disorders IOP)  Provider: Rosa Bhakta MD  Number of visits to be scheduled: 6  Length/Duration of Appointment in minutes: 60  Visit Type: In person  Additional notes:

## 2023-10-18 NOTE — GROUP NOTE
Group Therapy Documentation    PATIENT'S NAME: Jerad Ross  MRN:   [id number]  :   1960  [id number]  DATE OF SERVICE: 10/18/23  START TIME:  9:00 AM  END TIME: 12:00 PM  FACILITATOR(S): Joellen Diamond LADC; Reva Corbett [facility]  TOPIC: BEH Group Therapy  Number of patients attending the group:  4  Group Length:  3 Hours    Group Therapy Type: Addiction    Summary of Group / Topics Discussed:    Psychoeducation/Skills How Neurobiology Affects Behavior (IOP)    This topic will give a general overview of the neurobiology of addiction with the purpose of teaching new brain-based coping strategies to reduce the impact of cravings, urges and distorted memories.  The clients will learn how the midbrain uses memory and associations to create cravings and how to counteract these. They will learn why treatment focuses on calming the limbic system (midbrain) and strengthening the Prefrontal Cortex (PFC)         Objective(s):           Clients will learn 3 ways to calm their overactive midbrain and strengthen their frontal cortex to lessen the impact of cravings and urges for addictive substances.             Clients will identify the underlying mechanisms at work to help them detach from the thoughts and emotions that trigger using.           They will be able to describe the reward pathway involved in addiction           Identify 1 skill to counteract cravings and urges to use substances     Structure (modalities, homework, worksheets, etc.):           History of the study of addiction as a disease (PowerPoint)           Psychoeducation on the areas of the midbrain involved in addiction and how the various parts of the brain communicate with each other (PowerPoint)           Psychoeducation and discussion on how the escalation of addiction manifests in personal behaviors leading to negative consequences           Hands on practice of evidenced based strategies to calm the midbrain           Hands  "on practice of evidenced based strategies to strengthen the frontal cortex           Psychoeducation of anatomy of cravings, urges and addictive thinking and how to counteract this pattern           Show video clip of cross-addictions & process addictions (Jose Maria Corcoran)         Expected therapeutic outcome(s):           A reduction in shame and guilt due to understanding how addiction influences behavior.           Reduction of use episodes due to using skills to reduce cravings and understanding why they work to calm the midbrain.         Therapeutic outcome(s) measured by:    Teachback, group review and evaluation form    Attestation:   Dr. Bhakta - Medical Director - Provides oversight and supervision of care.       Group Attendance:  Attended group session    Patient's response to the group topic/interactions:  gave appropriate feedback to peers and listened actively    Patient appeared to be Engaged.        Client specific details:  Pt. Shared that he is feeling anxious due to it is his first day of group. Pt. Shared he is feeling \"a seance of relief I go in a place that can help me\". Pt. Shared he is getting [facility] next [facility]. Pt. Shared that he feels that he zahraa a fine line with his medication. 'I normally have 25 MG Hydroxyzine but the center ran out and I had some left over and I took 30 MG I feel like it was my addictive brain thinking\" Pt. Shared that situation triggered him and he is going to talk with his sponsor and he is going to a meeting later today. Pt. Affirmation \"I am able to do recovery\".    "

## 2023-10-18 NOTE — TELEPHONE ENCOUNTER
Reached out to Esther [facility]serman [place] and spoke with nurse who shared the JAZMINE was faxed on 10/17. Writer asked she try re-faxing it. Will notify the clinic staff to lookout for it.

## 2023-10-19 ENCOUNTER — HOSPITAL ENCOUNTER (OUTPATIENT)
Dept: BEHAVIORAL HEALTH | Facility: CLINIC | Age: 63
Discharge: HOME | End: 2023-10-19
Attending: FAMILY MEDICINE
Payer: COMMERCIAL

## 2023-10-19 DIAGNOSIS — F10.90 ALCOHOL USE DISORDER: Primary | ICD-10-CM

## 2023-10-19 PROCEDURE — H2035 A/D TX PROGRAM, PER HOUR: HCPCS | Mod: HQ

## 2023-10-19 NOTE — GROUP NOTE
"Group Therapy Documentation    PATIENT'S NAME: Jerad Ross  MRN:   [id number]  :   1960  [facility]. NUMBER: [id number]  DATE OF SERVICE: 10/19/23  START TIME:  9:00 AM  END TIME: 12:00 PM  FACILITATOR(S): Yaritza Lemus LADC; Joellen Diamond LADC  TOPIC: BEH Group Therapy  Number of patients attending the group:  5  Group Length:  3 Hours    Group Therapy Type: Addiction, Psychoeducation, and Psychotherapeutic    Summary of Group / Topics Discussed:    Meditation/Breathing Exercises, Mindfulness, and Biomedical impacts of substance use: short and long term.     Attestation:   Dr. Bhakta - Medical Director - Provides oversight and supervision of care.      Group Attendance:  Attended group session    Patient's response to the group topic/interactions:  cooperative with task and discussed personal experience with topic    Patient appeared to be Actively participating, Attentive, and Engaged.        Client specific details:  Jerad shared he has been feeling tired, emotionally agitated and spiritually adrift. He states that he has been combating this by watching football, going to AA, talking with his sponsor and attending [employer]. Jerad shared some concerns regarding his [facility] the [facility] on Tuesday stating it will be a high rist situation. He is going to plan ahead and have activities planned that day so he is not just sitting home alone. Jerad shared that \"I am happy\" for an affirmation stating even if he does not fully feel it he is working towards it.     "

## 2023-10-19 NOTE — TELEPHONE ENCOUNTER
Received two pages of JAZMINE for Esther MUIR, but it was filled out incorrectly. Madhu faxed back to Esther complete question 3 .    Jodee Garay on 10/19/2023 at 9:42 AM

## 2023-10-20 ENCOUNTER — DOCUMENTATION ONLY (OUTPATIENT)
Dept: BEHAVIORAL HEALTH | Facility: CLINIC | Age: 63
End: 2023-10-20

## 2023-10-20 ENCOUNTER — TRANSFERRED RECORDS (OUTPATIENT)
Dept: HEALTH INFORMATION MANAGEMENT | Facility: CLINIC | Age: 63
End: 2023-10-20

## 2023-10-20 NOTE — PROGRESS NOTES
"[facility] Weekly Treatment Plan Review      ATTENDANCE for the following date span:  10/16/23-10/22/23    Date     Group Therapy 0  hours 0  hours 3  hours 3  hours No group    Individual Therapy        Family Therapy        Other (Specify) Phase 1 &2  Phase 3  Phase 1  Phase 1 & 2        Patient did not have any absences during this time period.    Total hours: 6. Patient is in phase 1.     Weekly Treatment Plan Review     Treatment Plan initiated on: 10/10/2023.    Dimension1: Acute Intoxication/Withdrawal Potential -   Previous Dimension Ratin  Current Dimension Ratin  Date of Last Use: Alcohol-2023 and Oxycodone the \"end of July\"   Any reports of withdrawal symptoms: No  Narrative: Patient shows no withdrawal potential during Intake. Patient date of last use of alcohol is on 2023 and Oxycodone the \"end of July\"    Dimension 2: Biomedical Conditions & Complications -   Previous Dimension Ratin  Current Dimension Ratin  Medical Concerns:  None at this time  Current Medications & Medication Changes:  Current Outpatient Medications   Medication    acetaminophen (TYLENOL) 500 MG tablet    albuterol (PROAIR HFA) 108 (90 Base) MCG/ACT inhaler    aspirin 81 MG EC tablet    budesonide (PULMICORT FLEXHALER) 90 MCG/ACT inhaler    entecavir (BARACLUDE) 0.5 MG tablet    FLUoxetine (PROZAC) 20 MG capsule    FLUoxetine (PROZAC) 40 MG capsule    fluticasone-salmeterol (ADVAIR) 250-50 MCG/ACT inhaler    furosemide (LASIX) 20 MG tablet    hydrOXYzine (ATARAX) 10 MG tablet    hydrOXYzine (ATARAX) 25 MG tablet    isosorbide mononitrate (IMDUR) 60 MG 24 hr tablet    latanoprost (XALATAN) 0.005 % ophthalmic solution    metoprolol succinate ER (TOPROL XL) 25 MG 24 hr tablet    Multiple Vitamins-Iron (ONE DAILY MULTIVITAMIN/IRON) TABS    mycophenolate (GENERIC EQUIVALENT) 250 MG capsule    naltrexone (DEPADE/REVIA) 50 MG tablet    " nitroGLYcerin (NITROSTAT) 0.4 MG sublingual tablet    Omega-3 Fatty Acids (OMEGA 3 PO)    pantoprazole (PROTONIX) 40 MG EC tablet    polyethylene glycol (MIRALAX) 17 g packet    predniSONE (DELTASONE) 5 MG tablet    Rivaroxaban ANTICOAGULANT 15 & 20 MG TBPK Starter Therapy Pack    rosuvastatin (CRESTOR) 20 MG tablet    tacrolimus (PROTOPIC) 0.1 % external ointment     No current facility-administered medications for this visit.     Medication Prescriber:  PCP Aston Clarke  Taking meds as prescribed? Yes  Medication side effects or concerns:  None  Outside medical appointments this week (list provider and reason for visit):  None  Narrative: Patient has a pain of 3/10 due to his hip surgery. Patient shared that if his pain gets worse he can follow up with his PCP. Patient uses a walker to get around. Patient has a Nephrologist for his kidneys and a Hypnologist for Hepatitis B. Patient has no current concern and will reach out to his providers when needed .    Dimension 3: Emotional/Behavioral Conditions & Complications -   Previous Dimension Ratin  Current Dimension Ratin  PHQ9         2023     3:22 PM 2023     1:00 PM 2023     2:37 PM   PHQ-9 SCORE   PHQ-9 Total Score MyChart 14 (Moderate depression)  4 (Minimal depression)   PHQ-9 Total Score 14 15 4     GAD7         2023     3:26 PM 2023     1:00 PM 10/10/2023     9:16 AM   KIM-7 SCORE   Total Score 12 (moderate anxiety)  8 (mild anxiety)   Total Score 12    12 9 8     Mental health diagnosis Depression and Anxiety  Date of last SIB/SI/HI: N/A  Current MH Assignments:  DA with Jennifer Holliday   Narrative:  Patient reports depression and anxiety are a big factor of his mental health. 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 Patient reports no current therapy services. Patient reports no current SI, SIB, VI, and HI.      Dimension 4: Treatment Acceptance / Resistance -   Previous Dimension  Ratin  Current Dimension Ratin  ROBERT Diagnosis:    Stage:  N/A  Commitment to tx process/Stage of change: Preparation  ROBERT assignments:  None  Narrative: Patient is in the preparation stage of change. Patient shared he is wanting to get help and be in treatment.       Dimension 5: Relapse / Continued Problem Potential -   Previous Dimension Rating:  3  Current Dimension Rating:  3  Relapses this week:  No  Urges to use:   UA results: No results found for this or any previous visit (from the past 168 hour(s)).  Narrative: Patient lacks coping skills and relapse prevention skills and would benefit from this program. Patient is at high risk for relapse     Dimension 6: Recovery Environment -   Previous Dimension Ratin  Current Dimension Ratin  Family Involvement : N/A   Summarize attendance at family groups and family sessions: N/A  Family supportive of treatment?  Yes  Community support group attendance: Yes  Recreational activities:   Narrative: Patient has a supportive family and friends. Patient reported being involved with AA and goes to meetings. Patient is currently in a [facility] care unit however he reported being discharged this week and going back home. Patient reported once he is home he will have a PCA. Patient does not have probation or any legal issues. Pt. Shared when he moves back home he lives with a roommate who is also sober.     TOPIC  DATES   8 Dimensions of Wellness    Medical Aspects  10/16/23-10/22/23   Mental Health     Acceptance     Relationships     Structure & Balance     Relapse Prevention     Coping Skills       Justification for Continued Treatment at this Level of Care:  Patient is at high risk of relapse potential and would benefit from the groups copping skills and relapse prevention skills. Patient has Depression and Anxiety and would benefit from having one on one with Jennifer Holliday.    Discharge Planning:  Target Discharge Date/Timeframe:  3/12/2024   Med Mgmt  Provider/Appt:  PCP   Ind therapy Provider/Appt:  Not at this time   Family therapy Provider/Appt:  N/A   Other referrals:  None    Has vulnerable adult status change? No    Service Coordination:      Supervision:  Patient is staffed with [facility] treatment providers monthly.     Attestation:   Dr. Bhakta - Medical Director - Provides oversight and supervision of care.      Are Treatment Plan goals/objectives effective? Yes  *If no, list changes to treatment plan:    Are the current goals meeting client's needs? Yes  *If no, list the changes to treatment plan.      *Client agrees with any changes to the treatment plan: Yes  *Client received copy of changes: N/A  *Client is aware of right to access a treatment plan review: N/A

## 2023-10-20 NOTE — TELEPHONE ENCOUNTER
Writer reached out to  and spoke with nurse, Emerald who agreed to complete question 3 on the JAZMINE and fax it back. Will wait for a fax back.

## 2023-10-23 ENCOUNTER — HOSPITAL ENCOUNTER (OUTPATIENT)
Dept: BEHAVIORAL HEALTH | Facility: CLINIC | Age: 63
Discharge: HOME | End: 2023-10-23
Attending: FAMILY MEDICINE
Payer: COMMERCIAL

## 2023-10-23 DIAGNOSIS — F10.90 ALCOHOL USE DISORDER: Primary | ICD-10-CM

## 2023-10-23 PROCEDURE — H2035 A/D TX PROGRAM, PER HOUR: HCPCS | Mod: HQ

## 2023-10-23 NOTE — GROUP NOTE
"Group Therapy Documentation    PATIENT'S NAME: Jerad Ross  MRN:   [id number]  :   1960  ACCT. NUMBER: [id number]  DATE OF SERVICE: 10/23/23  START TIME:  9:00 AM  END TIME: 12:00 PM  FACILITATOR(S): Yaritza Lemus LADC; Joellen Diamond LADC  TOPIC: BEH Group Therapy  Number of patients attending the group:  6  Group Length:  3 Hours    Group Therapy Type: Addiction, Psychoeducation, and Psychotherapeutic    Summary of Group / Topics Discussed:    Cognitive Therapy Techniques, Meditation/Breathing Exercises, and Mindfulness    Attestation:   Dr. Bhakta - Medical Director - Provides oversight and supervision of care.      Group Attendance:  Attended group session    Patient's response to the group topic/interactions:  cooperative with task and discussed personal experience with topic    Patient appeared to be Actively participating, Attentive, and Engaged.        Client specific details:  Jerad states he felt safe both physically and mentally during group meditation today. He shared he is working on an emergency medical kit- he stated he felt like he needed some medications that he would consider abusing in this box. We processed addictive thought processes and \"closing the door\" to use. Jerad self identified as a caretaker and noted that at times his desire to care for others can put himself at risk. He shared with the group that he passed his amRemember The Member radio test.     "

## 2023-10-24 ENCOUNTER — TELEPHONE (OUTPATIENT)
Dept: BEHAVIORAL HEALTH | Facility: CLINIC | Age: 63
End: 2023-10-24

## 2023-10-25 ENCOUNTER — HOSPITAL ENCOUNTER (OUTPATIENT)
Dept: BEHAVIORAL HEALTH | Facility: CLINIC | Age: 63
Discharge: HOME | End: 2023-10-25
Attending: FAMILY MEDICINE
Payer: COMMERCIAL

## 2023-10-25 DIAGNOSIS — F10.90 ALCOHOL USE DISORDER: Primary | ICD-10-CM

## 2023-10-25 PROCEDURE — H2035 A/D TX PROGRAM, PER HOUR: HCPCS | Mod: HQ

## 2023-10-25 NOTE — GROUP NOTE
"Group Therapy Documentation    PATIENT'S NAME: Jerad Ross  MRN:   [id number]  :   1960  ACCT. NUMBER: [id number]  DATE OF SERVICE: 10/25/23  START TIME:  9:00 AM  END TIME: 12:00 PM  FACILITATOR(S): Joellen Diamond LADC; Yaritza Lemus LADC  TOPIC: BEH Group Therapy  Number of patients attending the group:  3  Group Length:  3 Hours    Group Therapy Type: Addiction    Summary of Group / Topics Discussed:    Choices in Recovery, Journaling, and Mindfulness  Attestation:   Dr. Bhakta - Medical Director - Provides oversight and supervision of care.       Group Attendance:  Attended group session    Patient's response to the group topic/interactions:  gave appropriate feedback to peers and listened actively    Patient appeared to be Attentive.        Client specific details:  Pt. Shared he is feeling \"sheet of paper crumpled up and I am trying to un crumple it\". Pt. Shared he was thrown off due to his not moving back to his house due to not having care at home schedule. Pt. Shared he will be moving out of the care facility next Tuesday. Pt. Shared he \"told on myself about the medical kit\". Pt talked about needing certain pills in it but thinks it is his addictive brain wanting those pills in there. Pt. Shared a trigger for a return to use could me more on his medical health. Pt. Shared he is wanting to work on acceptance with relationships and think if he is wanting to put effort in. Pt. Shared he is going to a meeting this afternoon, journaling, and calling friends or family. Pt. Shared he feels defectiveness within himself and helping others can be a distraction. Pt. Affirmation \"I am un crumpling\" .     "

## 2023-10-26 ENCOUNTER — HOSPITAL ENCOUNTER (OUTPATIENT)
Dept: BEHAVIORAL HEALTH | Facility: CLINIC | Age: 63
Discharge: HOME | End: 2023-10-26
Attending: FAMILY MEDICINE
Payer: COMMERCIAL

## 2023-10-26 ENCOUNTER — DOCUMENTATION ONLY (OUTPATIENT)
Dept: BEHAVIORAL HEALTH | Facility: CLINIC | Age: 63
End: 2023-10-26

## 2023-10-26 DIAGNOSIS — F43.10 PTSD (POST-TRAUMATIC STRESS DISORDER): ICD-10-CM

## 2023-10-26 DIAGNOSIS — F10.90 ALCOHOL USE DISORDER: Primary | ICD-10-CM

## 2023-10-26 PROCEDURE — H2035 A/D TX PROGRAM, PER HOUR: HCPCS | Mod: HQ

## 2023-10-26 PROCEDURE — H2035 A/D TX PROGRAM, PER HOUR: HCPCS

## 2023-10-26 ASSESSMENT — ANXIETY QUESTIONNAIRES
GAD7 TOTAL SCORE: 5
GAD7 TOTAL SCORE: 5
4. TROUBLE RELAXING: SEVERAL DAYS
5. BEING SO RESTLESS THAT IT IS HARD TO SIT STILL: NOT AT ALL
IF YOU CHECKED OFF ANY PROBLEMS ON THIS QUESTIONNAIRE, HOW DIFFICULT HAVE THESE PROBLEMS MADE IT FOR YOU TO DO YOUR WORK, TAKE CARE OF THINGS AT HOME, OR GET ALONG WITH OTHER PEOPLE: SOMEWHAT DIFFICULT
3. WORRYING TOO MUCH ABOUT DIFFERENT THINGS: SEVERAL DAYS
6. BECOMING EASILY ANNOYED OR IRRITABLE: SEVERAL DAYS
7. FEELING AFRAID AS IF SOMETHING AWFUL MIGHT HAPPEN: NOT AT ALL
1. FEELING NERVOUS, ANXIOUS, OR ON EDGE: SEVERAL DAYS
2. NOT BEING ABLE TO STOP OR CONTROL WORRYING: SEVERAL DAYS

## 2023-10-26 ASSESSMENT — PATIENT HEALTH QUESTIONNAIRE - PHQ9: SUM OF ALL RESPONSES TO PHQ QUESTIONS 1-9: 4

## 2023-10-26 NOTE — TELEPHONE ENCOUNTER
Reached out to Cirilo  nurse to obtain an updated JAZMINE. No answer at number provided. LVM, requesting a call back. Clinic number provided.

## 2023-10-26 NOTE — PROGRESS NOTES
Attestation:   Dr. Bhakta - Medical Director - Provides oversight and supervision of care.    Individual Session Summary (MICD)   DATE:  10/26/2023  START TIME: 12:30 PM   END TIME: 1:35 PM   Duration: 1 Hour    ROBERT Diagnosis: (F10.20) Alcohol Use Disorder    Mental Health Diagnosis: (F43.10) PTSD       Data:  Individual session to address co-occurring mental health and substance use disorders.  The session focused on client's thoughts and feelings regarding the treatment program, group, maintaining abstinence, and managing mental health symptoms.     Client denied suicidal thoughts, plans, or intent today. Client denied thoughts or behaviors of self-harm today.    Intervention: Utilized motivational interviewing to assess for stage of change and overall wellbeing. Provided verbal interventions such as including validation, support, and psycho-education. Provider used various therapeutic techniques including CBT. Taught breathing retraining for anxiety symptoms.  Writer utilized a strengths-based approach and trauma informed care (TIC).       Assessment: Jerad reported depression and anxiety symptoms are managed. He reported this treatment program is a good addition to his recovery in AA and SA. He reported increasing awareness of thoughts and feelings, as well as triggers and urges. He reported waking up with anxiety and was receptive to breathing retraining from PET for PTSD.  He may discuss medication for nightmares if needed (e.g. prazosin). He discussed upcoming move and CADI waiver services to help him at home. He expressed ambivalence about needing help impacting autonomy. Jerad appears open and willing to learn new skills that will help him in recovery.         9/12/2023     1:00 PM 9/28/2023     2:37 PM 10/26/2023     3:00 PM   PHQ-9 SCORE   PHQ-9 Total Score MyChart  4 (Minimal depression)    PHQ-9 Total Score 15 4 4         9/12/2023     1:00 PM 10/10/2023     9:16 AM 10/26/2023     3:00 PM   KIM-7 SCORE    Total Score  8 (mild anxiety)    Total Score 9 8 5     Pamela Mars will continue attending Phase 1 of Co-Occurring IOP and meet with writer for individual sessions weekly, Thursdays at 2pm.     Jennifer Holliday LPCC, LADC

## 2023-10-26 NOTE — GROUP NOTE
"Group Therapy Documentation    PATIENT'S NAME: Jerad Ross  MRN:   [id number]  :   1960  ACCT. NUMBER: [id number]  DATE OF SERVICE: 10/26/23  START TIME:  9:00 AM  END TIME: 12:00 PM  FACILITATOR(S): Jennifer Holliday, [facility], [facility]; Yaritza Lemus [facility]  TOPIC: BEH Group Therapy  Number of patients attending the group:  4  Group Length:  3 Hours    Group Therapy Type: Addiction, Psychotherapeutic, and Skills/Education    Summary of Group / Topics Discussed:    Cognitive Therapy Techniques, Co-occurring Illness, Coping Skills/Lifestyle Managemet, Choices in Recovery, Distress Tolerance, Leisure Exploration/Use of Leisure Time, Meditation/Breathing Exercises, Mindfulness, Proper Nutrition & Exercise, Self-Care Activities, Sleep Hygiene, Stress Management, Symptom Management, Thinking Errors/Negative Self-Talk, and Trauma Informed Care    Attestation:   Dr. Bhakta - Medical Director - Provides oversight and supervision of care.     Group Attendance:  Attended group session      Patient's response to the group topic/interactions:  cooperative with task, discussed personal experience with topic, expressed understanding of topic, gave appropriate feedback to peers, and listened actively    Patient appeared to be Actively participating, Attentive, and Engaged.        Client specific details:  Jerad reported continued abstinence with an urge while listening to 90's music, noting it was a trigger. He stated \"My favorite gear is 'more'.\".  He expressed surprise and worry that he wanted to indulge the feeling and \"didn't want to use a tool\" wondering if he won't be able to enjoy music if he is in \"recovery\". He identified use of Urge Surfing due to self awareness and a desire to abstain. He reported that he took a step back and ask \"Why am I doing this?\", and accepted feedback that this is good progress. He joked that his \"pre-frontal cortex is experiencing [place] Syndrome with his amygdala\", making an " "important observation. Jerad noted that he is supposed to be discharged on Friday or Tuesday from [facility] and will have CADI services at home. He plans to attend an AA meeting tonight and plans to use communication to deal with his frustration about being dependent on others. He is interested in building distress tolerance skills and practices. This weekend he plans to do recovery reading, meditation, connect with program people.   Affirmation: \"I am learning\". \"I grateful for not getting what I want.\"   Feeling: Surprise, delight, curious    "

## 2023-10-26 NOTE — PROGRESS NOTES
"[facility] Weekly Treatment Plan Review      ATTENDANCE for the following date span:  10/23/23-10/29/23    Date     Group Therapy 3  hours 0  hours 3  hours 3  hours No group    Individual Therapy        Family Therapy        Other (Specify) Phase 1 &2  Phase 3  Phase 1  Phase 1 & 2        Patient did not have any absences during this time period.    Total hours: 15. Patient is in phase 1.     Weekly Treatment Plan Review     Treatment Plan initiated on: 10/10/2023.    Dimension1: Acute Intoxication/Withdrawal Potential -   Previous Dimension Ratin  Current Dimension Ratin  Date of Last Use: Alcohol-2023 and Oxycodone the \"end of July\"   Any reports of withdrawal symptoms: No  Narrative: Patient shows no withdrawal potential during Intake. Patient date of last use of alcohol is on 2023 and Oxycodone the \"end of July\"    Dimension 2: Biomedical Conditions & Complications -   Previous Dimension Ratin  Current Dimension Ratin  Medical Concerns:  None at this time  Current Medications & Medication Changes:  Current Outpatient Medications   Medication    acetaminophen (TYLENOL) 500 MG tablet    albuterol (PROAIR HFA) 108 (90 Base) MCG/ACT inhaler    aspirin 81 MG EC tablet    budesonide (PULMICORT FLEXHALER) 90 MCG/ACT inhaler    entecavir (BARACLUDE) 0.5 MG tablet    FLUoxetine (PROZAC) 20 MG capsule    FLUoxetine (PROZAC) 40 MG capsule    fluticasone-salmeterol (ADVAIR) 250-50 MCG/ACT inhaler    furosemide (LASIX) 20 MG tablet    hydrOXYzine (ATARAX) 10 MG tablet    hydrOXYzine (ATARAX) 25 MG tablet    isosorbide mononitrate (IMDUR) 60 MG 24 hr tablet    latanoprost (XALATAN) 0.005 % ophthalmic solution    metoprolol succinate ER (TOPROL XL) 25 MG 24 hr tablet    Multiple Vitamins-Iron (ONE DAILY MULTIVITAMIN/IRON) TABS    mycophenolate (GENERIC EQUIVALENT) 250 MG capsule    naltrexone (DEPADE/REVIA) 50 MG tablet    " "nitroGLYcerin (NITROSTAT) 0.4 MG sublingual tablet    Omega-3 Fatty Acids (OMEGA 3 PO)    pantoprazole (PROTONIX) 40 MG EC tablet    polyethylene glycol (MIRALAX) 17 g packet    predniSONE (DELTASONE) 5 MG tablet    Rivaroxaban ANTICOAGULANT 15 & 20 MG TBPK Starter Therapy Pack    rosuvastatin (CRESTOR) 20 MG tablet    tacrolimus (PROTOPIC) 0.1 % external ointment     No current facility-administered medications for this visit.     Medication Prescriber:  PCP Aston Clarke  Taking meds as prescribed? Yes  Medication side effects or concerns:  None  Outside medical appointments this week (list provider and reason for visit):  None  Narrative: Patient has a pain of 3/10 due to his hip surgery. Patient shared that if his pain gets worse he can follow up with his PCP. Patient uses a walker to get around. Patient has a Nephrologist for his kidneys and a Hypnologist for Hepatitis B. Patient has no current concern and will reach out to his providers when needed .    Dimension 3: Emotional/Behavioral Conditions & Complications -   Previous Dimension Ratin  Current Dimension Ratin  PHQ9         2023     3:22 PM 2023     1:00 PM 2023     2:37 PM   PHQ-9 SCORE   PHQ-9 Total Score MyChart 14 (Moderate depression)  4 (Minimal depression)   PHQ-9 Total Score 14 15 4     GAD7         2023     3:26 PM 2023     1:00 PM 10/10/2023     9:16 AM   KIM-7 SCORE   Total Score 12 (moderate anxiety)  8 (mild anxiety)   Total Score 12    12 9 8     Mental health diagnosis Depression and Anxiety  Date of last SIB/SI/HI: N/A  Current MH Assignments:  DA with Jennifer Holliday   Narrative:  Patient reports depression and anxiety are a big factor of his mental health. 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 Patient reports no current therapy services. Patient reports no current SI, SIB, VI, and HI.  Pt. Shared he is feeling \"sheet of paper crumpled up and I am trying to un " "crumple it\". Pt. Shared he was thrown off due to his not moving back to his house due to not having care at home schedule.  Pt. Shared he feels defectiveness within himself and helping others can be a distraction. Pt. Affirmation \"I am un crumpling\" . He reported he is learning and grateful for not getting what he wants.   Feeling: Surprised, delight, curiou    Dimension 4: Treatment Acceptance / Resistance -   Previous Dimension Ratin  Current Dimension Ratin  ROBERT Diagnosis:    Stage:  N/A  Commitment to tx process/Stage of change: Preparation  ROBERT assignments:  None  Narrative: Patient is in the preparation stage of change. Patient shared he is wanting to get help and be in treatment.       Dimension 5: Relapse / Continued Problem Potential -   Previous Dimension Rating:  3  Current Dimension Rating:  3  Relapses this week:  No  Urges to use:   UA results: No results found for this or any previous visit (from the past 168 hour(s)).  Narrative: Patient lacks coping skills and relapse prevention skills and would benefit from this program. Patient is at high risk for relapse He shared he is working on an emergency medical kit- he stated he felt like he needed some medications that he would consider abusing in this box. We processed addictive thought processes and \"closing the door\" to use. Pt. Shared he \"told on myself about the medical kit\". Pt talked about needing certain pills in it but thinks it is his addictive brain wanting those pills in there. Pt. Shared a trigger for a return to use could me more on his medical health.     Dimension 6: Recovery Environment -   Previous Dimension Ratin  Current Dimension Ratin  Family Involvement : N/A   Summarize attendance at family groups and family sessions: N/A  Family supportive of treatment?  Yes  Community support group attendance: Yes  Recreational activities:   Narrative: Patient has a supportive family and friends. Patient reported being " involved with AA and goes to meetings. Patient is currently in a [facility] care unit however he reported being discharged this week and going back home. Patient reported once he is home he will have a PCA. Patient does not have probation or any legal issues. Pt. Shared when he moves back home he lives with a roommate who is also sober. 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 He is going to a  meeting tonight. Weekend recovery reading, meditation, connect with program people     TOPIC  DATES   8 Dimensions of Wellness    Medical Aspects  10/16/23-10/22/23   Mental Health  10/23/23-10/29/23   Acceptance     Relationships     Structure & Balance     Relapse Prevention     Coping Skills       Justification for Continued Treatment at this Level of Care:  Patient is at high risk of relapse potential and would benefit from the groups copping skills and relapse prevention skills. Patient has Depression and Anxiety and would benefit from having one on one with Jennifer Mg.    Discharge Planning:  Target Discharge Date/Timeframe:  3/12/2024   Med Mgmt Provider/Appt:  PCP   Ind therapy Provider/Appt:  Not at this time   Family therapy Provider/Appt:  N/A   Other referrals:  None    Has vulnerable adult status change? No    Service Coordination:      Supervision:  Patient is staffed with [facility] treatment providers monthly.     Attestation:   Dr. Bhakta - Medical Director - Provides oversight and supervision of care.      Are Treatment Plan goals/objectives effective? Yes  *If no, list changes to treatment plan:    Are the current goals meeting client's needs? Yes  *If no, list the changes to treatment plan.      *Client agrees with any changes to the treatment plan: Yes  *Client received copy of changes: N/A  *Client is aware of right to access a treatment plan review: N/A

## 2023-10-27 NOTE — TELEPHONE ENCOUNTER
Reached out to director of nursingRosa to connect regarding an updated JAZMINE so writer can fax over updated med list. No answer at number provided. LVM, requesting a call back. Clinic number provided.
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
## 2023-10-30 ENCOUNTER — DOCUMENTATION ONLY (OUTPATIENT)
Dept: BEHAVIORAL HEALTH | Facility: CLINIC | Age: 63
End: 2023-10-30

## 2023-10-30 NOTE — PROGRESS NOTES
ABSENT NOTE:    Jerad Ross was absent from group 10/30/2023. This absence was not excused. This writer attempted to contact patient via phone and left a VM message. Patient is expected to be in group on 10/30/2023.     Jennifer Holliday, [facility], [facility]  10/30/2023 , 1:03 PM

## 2023-11-01 ENCOUNTER — HOSPITAL ENCOUNTER (OUTPATIENT)
Dept: BEHAVIORAL HEALTH | Facility: CLINIC | Age: 63
Discharge: HOME | End: 2023-11-01
Attending: FAMILY MEDICINE
Payer: COMMERCIAL

## 2023-11-01 ENCOUNTER — TELEPHONE (OUTPATIENT)
Dept: PSYCHIATRY | Facility: CLINIC | Age: 63
End: 2023-11-01

## 2023-11-01 DIAGNOSIS — F10.90 ALCOHOL USE DISORDER: Primary | ICD-10-CM

## 2023-11-01 PROCEDURE — H2035 A/D TX PROGRAM, PER HOUR: HCPCS | Mod: HQ

## 2023-11-01 NOTE — TELEPHONE ENCOUNTER
Writer attempted to reach out to Esther MUIR and spoke with staff, [facility] who agreed to update the JAZMINE and fax it back to [phone]. Writer will wait for the fax.

## 2023-11-01 NOTE — TELEPHONE ENCOUNTER
On  10- the patient signed an JAZMINE authorizing medical records to be released from [facility] to [facility]  for the purpose of continuing care . Hard copy is being held in nurse triage until scanning is confirmed.     Sent to scanning.      RN, ( Serina informed that we received the JAZMINE).          Jodee Garay on 11/1/2023 at 1:14 PM

## 2023-11-01 NOTE — GROUP NOTE
Group Therapy Documentation    PATIENT'S NAME: Jerad Ross  MRN:   [id number]  :   1960  ACCT. NUMBER: [id number]  DATE OF SERVICE: 23  START TIME:  9:00 AM  END TIME: 12:00 PM  FACILITATOR(S): Yaritza Lemus LADC; Joellen Diamond LADC  TOPIC: BEH Group Therapy  Number of patients attending the group:  4  Group Length:  3 Hours    Group Therapy Type: Addiction, Psychoeducation, and Psychotherapeutic    Summary of Group / Topics Discussed:    Co-occurring Illness, Meditation/Breathing Exercises, Mindfulness, and discussion on acceptance. Group members were able to identify barriers to acceptance and areas they have improved acceptance overall.     Attestation:   Dr. Bhakta - Medical Director - Provides oversight and supervision of care.      Group Attendance:  Attended group session    Patient's response to the group topic/interactions:  became angry or agitated    Patient appeared to be Engaged.        Client specific details:  Jerad shared he was frustrated with this writer as he felt I called him out in front of his peers for an absence on Monday. We processed this and I stated my comment regarding absences was not only directed at him but the entire group. Ander shared his feelings of anger and frustration and processed them in the group setting. It was determined that it would be better discussed independently with this writer so we took time to do that on a group break. Jerad felt that I was passing blame on his absence and not taking responsibility for not getting his message that he was going to be out of group. I shared with him that we have a shared responsibility for communication and that we will work on a group absence policy.. Jerad was able to calm down and return to group where he shared his ideas on acceptance and early recovery. .

## 2023-11-02 ENCOUNTER — HOSPITAL ENCOUNTER (OUTPATIENT)
Dept: BEHAVIORAL HEALTH | Facility: CLINIC | Age: 63
Discharge: HOME | End: 2023-11-02
Attending: FAMILY MEDICINE
Payer: COMMERCIAL

## 2023-11-02 DIAGNOSIS — F10.90 ALCOHOL USE DISORDER: Primary | ICD-10-CM

## 2023-11-02 PROCEDURE — H2035 A/D TX PROGRAM, PER HOUR: HCPCS | Mod: 59

## 2023-11-02 PROCEDURE — H2035 A/D TX PROGRAM, PER HOUR: HCPCS | Mod: HQ

## 2023-11-02 NOTE — TELEPHONE ENCOUNTER
Letter with the recent medication changed faxed to Esther MUIR at [phone] (JAZMINE received).

## 2023-11-02 NOTE — GROUP NOTE
Group Therapy Documentation    PATIENT'S NAME: Jerad Ross  MRN:   [id number]  :   1960  ACCT. NUMBER: [id number]  DATE OF SERVICE: 23  START TIME:  9:00 AM  END TIME: 12:00 PM  FACILITATOR(S): Yaritza Lemus LADC; Joellen Diamond LADC  TOPIC: BEH Group Therapy  Number of patients attending the group:  4  Group Length:  3 Hours    Group Therapy Type: Addiction and Psychoeducation    Summary of Group / Topics Discussed:    Psychoeducation/Skills Acceptance  This topic will address various ways to increase acceptance as well as ways to increase self-acceptance and need for recovery.     Objective(s):  Identify the differences between acceptance, agreement and passivity  Identify how to respond when experiencing decreased acceptance .     Structure (modalities, homework, worksheets, etc)   Group discussion questions   Education on topic   Expected therapeutic outcome(s):  Patient will:  Identify ways they have increased acceptance of recovery   Therapeutic outcome(s) measured by:   Group discussion and personal reflections.    Attestation:   Dr. Bhakta - Medical Director - Provides oversight and supervision of care.      Group Attendance:  Attended group session    Patient's response to the group topic/interactions:  cooperative with task    Patient appeared to be Engaged.        Client specific details:  Jerad has a SMART goal of transition to home and call a daily review person to discuss how he is feeling. Jerad shared how acceptance has impacted him and how recognizing acceptance and agreement are not the same.

## 2023-11-03 ENCOUNTER — DOCUMENTATION ONLY (OUTPATIENT)
Dept: BEHAVIORAL HEALTH | Facility: CLINIC | Age: 63
End: 2023-11-03

## 2023-11-03 NOTE — PROGRESS NOTES
"[facility] Weekly Treatment Plan Review      ATTENDANCE for the following date span:  10/23/23-10/29/23    Date     Group Therapy 0  hours 0  hours 3  hours 3  hours No group    Individual Therapy    1    Family Therapy        Other (Specify) Phase 1 &2  Phase 3  Phase 1  Phase 1 & 2        Patient did not have any absences during this time period.    Total hours: 22. Patient is in phase 1.     Weekly Treatment Plan Review     Treatment Plan initiated on: 10/10/2023.    Dimension1: Acute Intoxication/Withdrawal Potential -   Previous Dimension Ratin  Current Dimension Ratin  Date of Last Use: Alcohol-2023 and Oxycodone the \"end of July\"   Any reports of withdrawal symptoms: No  Narrative: Patient shows no withdrawal potential during Intake. Patient date of last use of alcohol is on 2023 and Oxycodone the \"end of July\"    Dimension 2: Biomedical Conditions & Complications -   Previous Dimension Ratin  Current Dimension Ratin  Medical Concerns:  None at this time  Current Medications & Medication Changes:  Current Outpatient Medications   Medication    acetaminophen (TYLENOL) 500 MG tablet    albuterol (PROAIR HFA) 108 (90 Base) MCG/ACT inhaler    aspirin 81 MG EC tablet    budesonide (PULMICORT FLEXHALER) 90 MCG/ACT inhaler    entecavir (BARACLUDE) 0.5 MG tablet    FLUoxetine (PROZAC) 20 MG capsule    FLUoxetine (PROZAC) 40 MG capsule    fluticasone-salmeterol (ADVAIR) 250-50 MCG/ACT inhaler    furosemide (LASIX) 20 MG tablet    hydrOXYzine (ATARAX) 10 MG tablet    hydrOXYzine (ATARAX) 25 MG tablet    isosorbide mononitrate (IMDUR) 60 MG 24 hr tablet    latanoprost (XALATAN) 0.005 % ophthalmic solution    metoprolol succinate ER (TOPROL XL) 25 MG 24 hr tablet    Multiple Vitamins-Iron (ONE DAILY MULTIVITAMIN/IRON) TABS    mycophenolate (GENERIC EQUIVALENT) 250 MG capsule    naltrexone (DEPADE/REVIA) 50 MG tablet    " "nitroGLYcerin (NITROSTAT) 0.4 MG sublingual tablet    Omega-3 Fatty Acids (OMEGA 3 PO)    pantoprazole (PROTONIX) 40 MG EC tablet    polyethylene glycol (MIRALAX) 17 g packet    predniSONE (DELTASONE) 5 MG tablet    Rivaroxaban ANTICOAGULANT 15 & 20 MG TBPK Starter Therapy Pack    rosuvastatin (CRESTOR) 20 MG tablet    tacrolimus (PROTOPIC) 0.1 % external ointment     No current facility-administered medications for this visit.     Medication Prescriber:  PCP Aston Clarke  Taking meds as prescribed? Yes  Medication side effects or concerns:  None  Outside medical appointments this week (list provider and reason for visit):  None  Narrative: Patient has a pain of 3/10 due to his hip surgery. Patient shared that if his pain gets worse he can follow up with his PCP. Patient uses a walker to get around. Patient has a Nephrologist for his kidneys and a Hypnologist for Hepatitis B. Patient has no current concern and will reach out to his providers when needed .    Dimension 3: Emotional/Behavioral Conditions & Complications -   Previous Dimension Ratin  Current Dimension Ratin  PHQ9         2023     1:00 PM 2023     2:37 PM 10/26/2023     3:00 PM   PHQ-9 SCORE   PHQ-9 Total Score MyChart  4 (Minimal depression)    PHQ-9 Total Score 15 4 4     GAD7         2023     1:00 PM 10/10/2023     9:16 AM 10/26/2023     3:00 PM   KIM-7 SCORE   Total Score  8 (mild anxiety)    Total Score 9 8 5     Mental health diagnosis Depression and Anxiety  Date of last SIB/SI/HI: N/A  Current MH Assignments:  DA with Jennifer Holliday   Narrative:  Patient reports depression and anxiety are a big factor of his mental health. 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 Patient reports no current therapy services. Patient reports no current SI, SIB, VI, and HI.  Pt. Shared he is feeling \"sheet of paper crumpled up and I am trying to un crumple it\". Pt. Shared he was thrown off due to " "his not moving back to his house due to not having care at home schedule.  Pt. Shared he feels defectiveness within himself and helping others can be a distraction.      Dimension 4: Treatment Acceptance / Resistance -   Previous Dimension Ratin  Current Dimension Ratin  ROBERT Diagnosis:    Stage:  N/A  Commitment to tx process/Stage of change: Preparation  ROBERT assignments:  None  Narrative: Patient is in the preparation stage of change. Patient shared he is wanting to get help and be in treatment.       Dimension 5: Relapse / Continued Problem Potential -   Previous Dimension Rating:  3  Current Dimension Rating:  3  Relapses this week:  No  Urges to use:   UA results: No results found for this or any previous visit (from the past 168 hour(s)).  Narrative: Patient lacks coping skills and relapse prevention skills and would benefit from this program. Patient is at high risk for relapse He shared he is working on an emergency medical kit- he stated he felt like he needed some medications that he would consider abusing in this box. We processed addictive thought processes and \"closing the door\" to use. Pt. Shared he \"told on myself about the medical kit\". Pt talked about needing certain pills in it but thinks it is his addictive brain wanting those pills in there. Pt. Shared a trigger for a return to use could me more on his medical health. Jerad shared how acceptance has impacted him and how recognizing acceptance and agreement are not the same.      Dimension 6: Recovery Environment -   Previous Dimension Ratin  Current Dimension Ratin  Family Involvement : N/A   Summarize attendance at family groups and family sessions: N/A  Family supportive of treatment?  Yes  Community support group attendance: Yes  Recreational activities:   Narrative: Patient has a supportive family and friends. Patient reported being involved with AA and goes to meetings. Patient is currently in a [facility] care unit however " he reported being discharged this week and going back home. Patient reported once he is home he will have a PCA. Patient does not have probation or any legal issues. Pt. Shared when he moves back home he lives with a roommate who is also sober. 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 He is going to a  meeting tonight. Weekend recovery reading, meditation, connect with program people. Jerad has a SMART goal of transition to home and call a daily review person to discuss how he is feeling.     TOPIC  DATES   8 Dimensions of Wellness    Medical Aspects  10/16/23-10/22/23   Mental Health  10/23/23-10/29/23   Acceptance  10/30/23-11/5/23   Relationships     Structure & Balance     Relapse Prevention     Coping Skills       Justification for Continued Treatment at this Level of Care:  Patient is at high risk of relapse potential and would benefit from the groups copping skills and relapse prevention skills. Patient has Depression and Anxiety and would benefit from having one on one with Jennifer Holliday.    Discharge Planning:  Target Discharge Date/Timeframe:  3/12/2024   Med Mgmt Provider/Appt:  PCP   Ind therapy Provider/Appt:  Not at this time   Family therapy Provider/Appt:  N/A   Other referrals:  None    Has vulnerable adult status change? No    Service Coordination:      Supervision:  Patient is staffed with [facility] treatment providers monthly.     Attestation:   Dr. Bhakta - Medical Director - Provides oversight and supervision of care.      Are Treatment Plan goals/objectives effective? Yes  *If no, list changes to treatment plan:    Are the current goals meeting client's needs? Yes  *If no, list the changes to treatment plan.      *Client agrees with any changes to the treatment plan: Yes  *Client received copy of changes: N/A  *Client is aware of right to access a treatment plan review: N/A

## 2023-11-06 ENCOUNTER — HOSPITAL ENCOUNTER (OUTPATIENT)
Dept: BEHAVIORAL HEALTH | Facility: CLINIC | Age: 63
Discharge: HOME | End: 2023-11-06
Attending: FAMILY MEDICINE
Payer: COMMERCIAL

## 2023-11-06 ENCOUNTER — DOCUMENTATION ONLY (OUTPATIENT)
Dept: BEHAVIORAL HEALTH | Facility: CLINIC | Age: 63
End: 2023-11-06

## 2023-11-06 DIAGNOSIS — F10.90 ALCOHOL USE DISORDER: Primary | ICD-10-CM

## 2023-11-06 DIAGNOSIS — F32.A DEPRESSION, UNSPECIFIED DEPRESSION TYPE: ICD-10-CM

## 2023-11-06 DIAGNOSIS — F41.1 GENERALIZED ANXIETY DISORDER: Chronic | ICD-10-CM

## 2023-11-06 PROCEDURE — H2035 A/D TX PROGRAM, PER HOUR: HCPCS | Mod: HQ

## 2023-11-06 NOTE — PROGRESS NOTES
Jerad tested negative and blew 0.00. Jerad was given his treatment plan and signed it.     Joellen Diamond, ADC-T 1:08 pm

## 2023-11-06 NOTE — GROUP NOTE
"Group Therapy Documentation    PATIENT'S NAME: Jerad Ross  MRN:   [id number]  :   1960  ACCT. NUMBER: [id number]  DATE OF SERVICE: 23  START TIME:  9:00 AM  END TIME: 12:00 PM  FACILITATOR(S): Jennifer Holliday [facility], [facility] and HOWARD Plaaz   TOPIC: BEH Group Therapy  Number of patients attending the group:  9  Group Length:  3 Hours    Group Therapy Type: Addiction, Psychotherapeutic, and Skills/Education    Summary of Group / Topics Discussed:    Boredom, Cognitive Therapy Techniques, Co-occurring Illness, Coping Skills/Lifestyle Managemet, Meditation/Breathing Exercises, Mindfulness, Sleep Hygiene, Stress Management, Symptom Management, Thinking Errors/Negative Self-Talk, and Trauma Informed Care    Attestation:   Dr. Bhakta - Medical Director - Provides oversight and supervision of care.      Group Attendance:  Attended group session    Patient's response to the group topic/interactions:  cooperative with task, expressed understanding of topic, and listened actively    Patient appeared to be Actively participating, Attentive, and Engaged.        Client specific details:  Jerad reported continuing abstinence with no thoughts, urges or cravings. He reported he is grateful for 3 months of sobriety and noted it is also \"scary\".   No new medical or dental concerns noted. He is scheduled to discharge from the TCU and return home. Reported use of coping skills utilized: going to (AA/SA) meetings, meditaion, talking to others in recovery, reading recovery literature. He discussed a [place] reading for today: \"I will listen to and respect my own inner promptings\". He noted that taking a pause, slowing down, taking a \"Hot Second\" as discussed in group last week is critical for his recovery.  Affirmation: I have worth and value   Identified feelings: tired, squirrely.  "

## 2023-11-07 NOTE — ADDENDUM NOTE
Encounter addended by: Jennifer Holliday, ARSENIOC, LADC on: 11/7/2023 11:44 AM   Actions taken: Clinical Note Signed

## 2023-11-08 ENCOUNTER — HOSPITAL ENCOUNTER (OUTPATIENT)
Dept: BEHAVIORAL HEALTH | Facility: CLINIC | Age: 63
Discharge: HOME | End: 2023-11-08
Attending: FAMILY MEDICINE
Payer: COMMERCIAL

## 2023-11-08 DIAGNOSIS — F10.90 ALCOHOL USE DISORDER: Primary | ICD-10-CM

## 2023-11-08 PROCEDURE — H2035 A/D TX PROGRAM, PER HOUR: HCPCS | Mod: HQ

## 2023-11-08 NOTE — GROUP NOTE
"Group Therapy Documentation    PATIENT'S NAME: Jerad Ross  MRN:   [id number]  :   1960  ACCT. NUMBER: [id number]  DATE OF SERVICE: 23  START TIME:  9:00 AM  END TIME: 12:00 PM  FACILITATOR(S): Yaritza Lemus LADC; Joellen Diamond LADC and JAMIE Plaza   TOPIC: BEH Group Therapy  Number of patients attending the group:  4  Group Length:  3 Hours    Group Therapy Type: Addiction, Psychoeducation, and Psychotherapeutic    Summary of Group / Topics Discussed:    Psychoeducation/Skills Relationships  This topic will give a general overview of relationships and various types of support.     Objective(s):  Patient will identify what a healthy relationship is  Patient will identify what supportive, neutral support and destructive mean to them.   Structure:  Provide psychoeducation components of a healthy relationship  Discuss boundaries with family in friends  Identify ways to invite people into recovery     Expected therapeutic outcomes:    Understand individual needs in a relationships  Identify who their support people are  Share a way they can invite someone into their recovery    Therapeutic outcome(s) measured by:  Patient's ability to share personal experience with group    Attestation:   Dr. Bhakta - Medical Director - Provides oversight and supervision of care.      Group Attendance:  Attended group session    Patient's response to the group topic/interactions:  cooperative with task and discussed personal experience with topic    Patient appeared to be Actively participating, Attentive, and Engaged.        Client specific details:  Jerad shared that he was transitioned home from the TCU, he feels like some of his wants were not met and some of his needs. Jerad shared that he is working on having connections and feeling connected to things around him. Jerad shared he is in a place of working on acceptance and shared for his affirmation \"I am durable\".     "

## 2023-11-09 ENCOUNTER — HOSPITAL ENCOUNTER (OUTPATIENT)
Dept: BEHAVIORAL HEALTH | Facility: CLINIC | Age: 63
Discharge: HOME | End: 2023-11-09
Attending: FAMILY MEDICINE
Payer: COMMERCIAL

## 2023-11-09 ENCOUNTER — DOCUMENTATION ONLY (OUTPATIENT)
Dept: BEHAVIORAL HEALTH | Facility: CLINIC | Age: 63
End: 2023-11-09

## 2023-11-09 DIAGNOSIS — F10.90 ALCOHOL USE DISORDER: Primary | ICD-10-CM

## 2023-11-09 PROCEDURE — H2035 A/D TX PROGRAM, PER HOUR: HCPCS | Mod: HQ

## 2023-11-09 PROCEDURE — H2035 A/D TX PROGRAM, PER HOUR: HCPCS | Mod: 59

## 2023-11-09 NOTE — PROGRESS NOTES
"[facility] Weekly Treatment Plan Review      ATTENDANCE for the following date span:  23-23    Date     Group Therapy 3  hours 0  hours 3  hours 3  hours No group    Individual Therapy    1    Family Therapy        Other (Specify) Phase 1 &2  Phase 3  Phase 1  Phase 1 & 2        Patient did not have any absences during this time period.    Total hours: 32. Patient is in phase 1.     Weekly Treatment Plan Review     Treatment Plan initiated on: 10/10/2023.    Dimension1: Acute Intoxication/Withdrawal Potential -   Previous Dimension Ratin  Current Dimension Ratin  Date of Last Use: Alcohol-2023 and Oxycodone the \"end of July\"   Any reports of withdrawal symptoms: No  Narrative: Patient shows no withdrawal potential during Intake. Patient date of last use of alcohol is on 2023 and Oxycodone the \"end of July\"    Dimension 2: Biomedical Conditions & Complications -   Previous Dimension Ratin  Current Dimension Ratin  Medical Concerns:  None at this time  Current Medications & Medication Changes:  Current Outpatient Medications   Medication    acetaminophen (TYLENOL) 500 MG tablet    albuterol (PROAIR HFA) 108 (90 Base) MCG/ACT inhaler    aspirin 81 MG EC tablet    budesonide (PULMICORT FLEXHALER) 90 MCG/ACT inhaler    entecavir (BARACLUDE) 0.5 MG tablet    FLUoxetine (PROZAC) 20 MG capsule    FLUoxetine (PROZAC) 40 MG capsule    fluticasone-salmeterol (ADVAIR) 250-50 MCG/ACT inhaler    furosemide (LASIX) 20 MG tablet    hydrOXYzine (ATARAX) 10 MG tablet    hydrOXYzine (ATARAX) 25 MG tablet    isosorbide mononitrate (IMDUR) 60 MG 24 hr tablet    latanoprost (XALATAN) 0.005 % ophthalmic solution    metoprolol succinate ER (TOPROL XL) 25 MG 24 hr tablet    Multiple Vitamins-Iron (ONE DAILY MULTIVITAMIN/IRON) TABS    mycophenolate (GENERIC EQUIVALENT) 250 MG capsule    naltrexone (DEPADE/REVIA) 50 MG tablet    " "nitroGLYcerin (NITROSTAT) 0.4 MG sublingual tablet    Omega-3 Fatty Acids (OMEGA 3 PO)    pantoprazole (PROTONIX) 40 MG EC tablet    polyethylene glycol (MIRALAX) 17 g packet    predniSONE (DELTASONE) 5 MG tablet    Rivaroxaban ANTICOAGULANT 15 & 20 MG TBPK Starter Therapy Pack    rosuvastatin (CRESTOR) 20 MG tablet    tacrolimus (PROTOPIC) 0.1 % external ointment     No current facility-administered medications for this visit.     Medication Prescriber:  PCP Aston Clarke  Taking meds as prescribed? Yes  Medication side effects or concerns:  None  Outside medical appointments this week (list provider and reason for visit):  None  Narrative: Patient has a pain of 3/10 due to his hip surgery. Patient shared that if his pain gets worse he can follow up with his PCP. Patient uses a walker to get around. Patient has a Nephrologist for his kidneys and a Hypnologist for Hepatitis B. Patient has no current concern and will reach out to his providers when needed .    Dimension 3: Emotional/Behavioral Conditions & Complications -   Previous Dimension Ratin  Current Dimension Ratin  PHQ9         2023     1:00 PM 2023     2:37 PM 10/26/2023     3:00 PM   PHQ-9 SCORE   PHQ-9 Total Score MyChart  4 (Minimal depression)    PHQ-9 Total Score 15 4 4     GAD7         2023     1:00 PM 10/10/2023     9:16 AM 10/26/2023     3:00 PM   KIM-7 SCORE   Total Score  8 (mild anxiety)    Total Score 9 8 5     Mental health diagnosis Depression and Anxiety  Date of last SIB/SI/HI: N/A  Current MH Assignments:  DA with Jennifer Holliday   Narrative:  Patient reports depression and anxiety are a big factor of his mental health. 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 Patient reports no current therapy services. Patient reports no current SI, SIB, VI, and HI.  Pt. Shared he is feeling \"sheet of paper crumpled up and I am trying to un crumple it\". Pt. Shared he was thrown off due to " "his not moving back to his house due to not having care at home schedule.  Pt. Shared he feels defectiveness within himself and helping others can be a distraction.  Jerad goal for the week is staying sober/ working on emotional sobriety. Jerad shared working on self love.      Dimension 4: Treatment Acceptance / Resistance -   Previous Dimension Ratin  Current Dimension Ratin  ROBERT Diagnosis:    Stage:  N/A  Commitment to tx process/Stage of change: Preparation  ROBERT assignments:  None  Narrative: Patient is in the preparation stage of change. Patient shared he is wanting to get help and be in treatment.       Dimension 5: Relapse / Continued Problem Potential -   Previous Dimension Rating:  3  Current Dimension Rating:  3  Relapses this week:  No  Urges to use:   UA results: No results found for this or any previous visit (from the past 168 hour(s)).  Narrative: Patient lacks coping skills and relapse prevention skills and would benefit from this program. Patient is at high risk for relapse He shared he is working on an emergency medical kit- he stated he felt like he needed some medications that he would consider abusing in this box. We processed addictive thought processes and \"closing the door\" to use. Pt. Shared he \"told on myself about the medical kit\". Pt talked about needing certain pills in it but thinks it is his addictive brain wanting those pills in there. Pt. Shared a trigger for a return to use could me more on his medical health. Jerad shared how acceptance has impacted him and how recognizing acceptance and agreement are not the same.  He reported he is grateful for 3 months of sobriety and noted it is also \"scary\". Jerad shared a high risk situation is isolating himself. Jerad is now moved back into his home however plans to talk with his sponsor and attend sober support meetings.      Dimension 6: Recovery Environment -   Previous Dimension Ratin  Current Dimension Ratin  Family " Involvement : N/A   Summarize attendance at family groups and family sessions: N/A  Family supportive of treatment?  Yes  Community support group attendance: Yes  Recreational activities:   Narrative: Patient has a supportive family and friends. Patient reported being involved with AA and goes to meetings. Patient is currently in a [facility] care unit however he reported being discharged this week and going back home. Patient reported once he is home he will have a PCA. Patient does not have probation or any legal issues. Pt. Shared when he moves back home he lives with a roommate who is also sober. 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 He is going to a  meeting tonight. Weekend recovery reading, meditation, connect with program people.He is scheduled to discharge from the TCU and return home. Jerad is involved to going to (AA/SA) meetings, meditaion, talking to others in recovery,and  reading.    TOPIC  DATES   8 Dimensions of Wellness    Medical Aspects  10/16/23-10/22/23   Mental Health  10/23/23-10/29/23   Acceptance  10/30/23-11/5/23   Relationships  11/6/2023-11/12/23   Structure & Balance     Relapse Prevention     Coping Skills       Justification for Continued Treatment at this Level of Care:  Patient is at high risk of relapse potential and would benefit from the groups copping skills and relapse prevention skills. Patient has Depression and Anxiety and would benefit from having one on one with Jennifer Holliday.    Discharge Planning:  Target Discharge Date/Timeframe:  3/12/2024   Med Mgmt Provider/Appt:  PCP   Ind therapy Provider/Appt:  Not at this time   Family therapy Provider/Appt:  N/A   Other referrals:  None    Has vulnerable adult status change? No    Service Coordination:      Supervision:  Patient is staffed with [facility] treatment providers monthly.     Attestation:   Dr. Bhakta - Medical Director - Provides oversight and supervision of care.      Are Treatment Plan  goals/objectives effective? Yes  *If no, list changes to treatment plan:    Are the current goals meeting client's needs? Yes  *If no, list the changes to treatment plan.      *Client agrees with any changes to the treatment plan: Yes  *Client received copy of changes: N/A  *Client is aware of right to access a treatment plan review: N/A

## 2023-11-09 NOTE — GROUP NOTE
Group Therapy Documentation    PATIENT'S NAME: Jerad Ross  MRN:   [id number]  :   1960  ACCT. NUMBER: [id number]  DATE OF SERVICE: 23  START TIME:  9:00 AM  END TIME: 12:00 PM  FACILITATOR(S): Joellen Diamond LADC; Yaritza Lemus LADC  TOPIC: BEH Group Therapy  Number of patients attending the group:  8  Group Length:  3 Hours  Facilitators:   Yaritza Diamond  Group Therapy Type: Addiction and Skills/Education    Summary of Group / Topics Discussed:    Boundaries, Communication, Meditation/Breathing Exercises, Mindfulness, and Relationships  Attestation:   Dr. Bhakta - Medical Director - Provides oversight and supervision of care.       Group Attendance:  Attended group session    Patient's response to the group topic/interactions:  gave appropriate feedback to peers and listened actively    Patient appeared to be Engaged.        Client specific details:  Jerad goal for the week is staying sober/ working on emotional sobriety. Jerad shared working on self love. Jerad shared a high risk situation is isolating himself. Jerad is now moved back into his home however plans to talk with his sponsor and attend sober support meetings.

## 2023-11-13 ENCOUNTER — HOSPITAL ENCOUNTER (OUTPATIENT)
Dept: BEHAVIORAL HEALTH | Facility: CLINIC | Age: 63
Discharge: HOME | End: 2023-11-13
Attending: FAMILY MEDICINE
Payer: COMMERCIAL

## 2023-11-13 DIAGNOSIS — F10.20 ALCOHOL USE DISORDER, SEVERE, DEPENDENCE (H): Primary | ICD-10-CM

## 2023-11-13 DIAGNOSIS — F43.10 PTSD (POST-TRAUMATIC STRESS DISORDER): ICD-10-CM

## 2023-11-13 PROCEDURE — H2035 A/D TX PROGRAM, PER HOUR: HCPCS | Mod: HQ

## 2023-11-13 NOTE — GROUP NOTE
"Group Therapy Documentation    PATIENT'S NAME: Jerad Ross  MRN:   [id number]  :   1960  ACCT. NUMBER: [id number]  DATE OF SERVICE: 23  START TIME:  9:00 AM  END TIME: 12:00 PM  FACILITATOR(S): Jennifer Holliday LPC, [facility]  TOPIC: BEH Group Therapy  Facilitator: Mikael Lane [facility]  Attestation:   Dr. Bhakta - Medical Director - Provides oversight and supervision of care.    Number of patients attending the group:  4  Group Length:  3 Hours  Group Therapy Type: Addiction, Psychotherapeutic, and Skills/Education  Summary of Group / Topics Discussed:  Co-occurring Illness, Coping Skills/Lifestyle Managemet, Choices in Recovery, Meditation/Breathing Exercises, Mindfulness, Self-Care Activities, Stress Management, Symptom Management, and Trauma Informed Care    Group Attendance:  Attended group session    Patient's response to the group topic/interactions:      Patient appeared to be Actively participating, Attentive, and Engaged.        Client specific details:  Jerad reported continuing abstinence with no significant thoughts, urges or cravings. He expressed gratitude for family. He shared his sexual addiction and engagement with , noting that he works on this everyday, noting that what had been understood as a release (porn) is now considered time consuming and against my morals\". Although concerned about sharing this in group, he decided this would be a safe place. He was supported by the group.   Effective coping skills include \"routine in the morning, including meditation, prayer, reading recovery literature and scripture, connect with others in recovery. He reported attending 2 12-step meetings (Friday AA and  SA)be transparent, recovery meeting Friday and  (porn) ().  Recovery activity for today will be taking an inventory (have I been resentful, dishonest, afraid, selfish, share that with SA sponsor, for accountability.).  He engaged with others and the material presented as " "evidenced by his understanding of the meditation \"Anxiety is not the truth.\" and \"How can my anxiety be useful?\"  Affirmation: \"I am a bad motherfucker\".        "

## 2023-11-13 NOTE — PROGRESS NOTES
"Attestation:   Dr. Bhakta - Medical Director - Provides oversight and supervision of care.    Individual Session Summary (MICD)   DATE:  11/16/2023  START TIME: 2:05 PM   END TIME: 3:15 PM   Duration: 1 Hour and 10 minutes    ROBERT Diagnosis: (F10.20) Alcohol Use Disorder  Mental Health Diagnosis: (F43.10) PTSD     Data:  Individual session to address co-occurring mental health and substance use disorders.  The session focused on client's thoughts and feelings regarding the treatment program, group, maintaining abstinence, and managing mental health symptoms.     Client endorsed passive suicidal ideation with no plan or intent. He denied, homicidal, self-harm, or violent thoughts, plans, or intent at this appointment. He reported pain as a 6-7 when he woke up and noted he is feeling cut off from others and is experiencing fear. He noted his pain level at this point is a 2.     Intervention: Utilized motivational interviewing to assess for stage of change and overall wellbeing. Provided verbal interventions including validation, support, and psycho-education. Provider used various therapeutic techniques including Narrative CBT and Twelve-Step Facilitation with Motivational Enhancement Therapy.  Writer utilized a strengths-based approach and trauma informed care (TIC). Chain analysis for previous return to use and planning to reduce risk factors for another return to use if conditions are similar in the future. Exposure to SMART Recovery Cost Benefit Analysis  and sent it to Jerad via email. Reviewed psychotherapy options for community engagement. Introduced MetaCognitive Therapy as an option. Encouraged Jerad to download the kiel on Metacognitive therapy and take a look at it.     Assessment:  Jerad endorsed passive suicidal ideation with no plan or intent. He denied, homicidal, self-harm, or violent thoughts, plans, or intent at this appointment. He reported pain as a 6-7 when he woke up and noted he is feeling \"self " "absorbed, cut off from others and was experiencing fear\". He noted his pain level at this point is a 2. He noted that he is not sleeping and  agreed ot discuss with with his prescriber. He idenitified building his \"kit\", acceptance of \"not knowing what the future will bring\" and attending meetings will help to keep him in the present. To manage the pain he took 5mg of prednisone, 2 ibuprofen, fish oil supplement and is moving. He helped to manage his fear by noticing and acknowledging it and agreed to share it with a trusted other. When feeling hopeless he noted feeling panic with fear that he would fall and experience pain. When he acknowledged it, he noted that the felt challenged, and said in his mind \"I'm not gonna let the pain dictate \"something jordan up in me that was empowering, rebellious and energizing\". He shared some about his journey in AA and his relationship with his HP discussing Step 2. He explored the concept of willingness.         9/12/2023     1:00 PM 9/28/2023     2:37 PM 10/26/2023     3:00 PM   PHQ-9 SCORE   PHQ-9 Total Score MyChart  4 (Minimal depression)    PHQ-9 Total Score 15 4 4         9/12/2023     1:00 PM 10/10/2023     9:16 AM 10/26/2023     3:00 PM   KIM-7 SCORE   Total Score  8 (mild anxiety)    Total Score 9 8 5   11/7/2023 PCL score: 29     PlanJesse Mars will continue attending Phase 1 of Co-Occurring IOP and meet with writer for individual sessions weekly, Thursdays at 2pm.     Jennifer Holliday, ARSENIOC, LADC   "

## 2023-11-14 NOTE — ADDENDUM NOTE
Encounter addended by: Jennifer Holliday, ARSENIOC, LADC on: 11/14/2023 11:40 AM   Actions taken: Clinical Note Signed

## 2023-11-15 ENCOUNTER — HOSPITAL ENCOUNTER (OUTPATIENT)
Dept: BEHAVIORAL HEALTH | Facility: CLINIC | Age: 63
Discharge: HOME | End: 2023-11-15
Attending: FAMILY MEDICINE
Payer: COMMERCIAL

## 2023-11-15 ENCOUNTER — DOCUMENTATION ONLY (OUTPATIENT)
Dept: BEHAVIORAL HEALTH | Facility: CLINIC | Age: 63
End: 2023-11-15

## 2023-11-15 DIAGNOSIS — F10.90 ALCOHOL USE DISORDER: Primary | ICD-10-CM

## 2023-11-15 PROCEDURE — H2035 A/D TX PROGRAM, PER HOUR: HCPCS | Mod: HQ

## 2023-11-15 NOTE — GROUP NOTE
"Group Therapy Documentation    PATIENT'S NAME: Jerad Ross  MRN:   [id number]  :   1960  ACCT. NUMBER: [id number]  DATE OF SERVICE: 11/15/23  START TIME:  9:00 AM  END TIME: 12:00 PM  FACILITATOR(S): Joellen Diamond LAD; Yaritza Lemus [facility]; Mikael Lane [facility]  TOPIC: BEH Group Therapy  Number of patients attending the group:  5  Group Length:  3 Hours    Group Therapy Type: Addiction and Life skill(s)    Summary of Group / Topics Discussed:    Boundaries, Coping Skills/Lifestyle Managemet, Meditation/Breathing Exercises, and Mindfulness. Defined triggers, craving, thoughts, and relapse. Discussed healthy social support people and environments.     Attestation:   Dr. Bhakta - Medical Director - Provides oversight and supervision of care.       Group Attendance:  Attended group session    Patient's response to the group topic/interactions:  gave appropriate feedback to peers and listened actively    Patient appeared to be Engaged.        Client specific details:  Jerad shared feeling like a burden and experiencing  some fatigue and it is due from \"the effort of living\". Jerad shared staying sober however having fledging thoughts about total wine can deliver during group. Jerad shared he is taking less of his blood pressure medication and his supplements, he is feeling frustrated on having to schedule a appointment to make sure he does not have to take some medication anymore. Jerad shared he is working on re framing his thoughts and wanting to go on walk and gratitude. Jerad affirmation \"I am capable and outrageous\".    "

## 2023-11-15 NOTE — PROGRESS NOTES
Addiction Outpatient Weekly Clinical Staffing     Jerad Ross was staffed on 11/15/2023 . Jerad Ross was staffed on recovery strengths, barriers and treatment progress.     Staff present: Yaritza Lemus [facility], Raegan Caal [facility], Juana Zarate [facility], Jennifer Holliday [facility], [facility], Alondra Hollingsworth [facility], Donald Welander, [facility], Joellen Diamond, ADC-T, and Mikael Lane [facility]     Date: 11/15/2023 Time: 3:58 PM    Staff Signature: Yaritza Lemus [facility]

## 2023-11-16 ENCOUNTER — HOSPITAL ENCOUNTER (OUTPATIENT)
Dept: BEHAVIORAL HEALTH | Facility: CLINIC | Age: 63
Discharge: HOME | End: 2023-11-16
Attending: FAMILY MEDICINE
Payer: COMMERCIAL

## 2023-11-16 ENCOUNTER — DOCUMENTATION ONLY (OUTPATIENT)
Dept: BEHAVIORAL HEALTH | Facility: CLINIC | Age: 63
End: 2023-11-16

## 2023-11-16 DIAGNOSIS — F10.90 ALCOHOL USE DISORDER: Primary | ICD-10-CM

## 2023-11-16 PROCEDURE — H2035 A/D TX PROGRAM, PER HOUR: HCPCS

## 2023-11-16 PROCEDURE — H2035 A/D TX PROGRAM, PER HOUR: HCPCS | Mod: HQ

## 2023-11-16 NOTE — PROGRESS NOTES
"[facility] Weekly Treatment Plan Review      ATTENDANCE for the following date span:  23-23    Date     Group Therapy 3  hours 0  hours 3  hours 3  hours No group    Individual Therapy    1    Family Therapy        Other (Specify) Phase 1 &2  Phase 3  Phase 1  Phase 1 & 2        Patient did not have any absences during this time period.    Total hours: 42. Patient is in phase 1.     Weekly Treatment Plan Review     Treatment Plan initiated on: 10/10/2023.    Dimension1: Acute Intoxication/Withdrawal Potential -   Previous Dimension Ratin  Current Dimension Ratin  Date of Last Use: Alcohol-2023 and Oxycodone the \"end of July\"   Any reports of withdrawal symptoms: No  Narrative: Patient shows no withdrawal potential during Intake. Patient date of last use of alcohol is on 2023 and Oxycodone the \"end of July\"    Dimension 2: Biomedical Conditions & Complications -   Previous Dimension Ratin  Current Dimension Ratin  Medical Concerns:  None at this time  Current Medications & Medication Changes:  Current Outpatient Medications   Medication    acetaminophen (TYLENOL) 500 MG tablet    albuterol (PROAIR HFA) 108 (90 Base) MCG/ACT inhaler    aspirin 81 MG EC tablet    budesonide (PULMICORT FLEXHALER) 90 MCG/ACT inhaler    entecavir (BARACLUDE) 0.5 MG tablet    FLUoxetine (PROZAC) 20 MG capsule    FLUoxetine (PROZAC) 40 MG capsule    fluticasone-salmeterol (ADVAIR) 250-50 MCG/ACT inhaler    furosemide (LASIX) 20 MG tablet    hydrOXYzine (ATARAX) 10 MG tablet    hydrOXYzine (ATARAX) 25 MG tablet    isosorbide mononitrate (IMDUR) 60 MG 24 hr tablet    latanoprost (XALATAN) 0.005 % ophthalmic solution    metoprolol succinate ER (TOPROL XL) 25 MG 24 hr tablet    Multiple Vitamins-Iron (ONE DAILY MULTIVITAMIN/IRON) TABS    mycophenolate (GENERIC EQUIVALENT) 250 MG capsule    naltrexone (DEPADE/REVIA) 50 MG tablet    " "nitroGLYcerin (NITROSTAT) 0.4 MG sublingual tablet    Omega-3 Fatty Acids (OMEGA 3 PO)    pantoprazole (PROTONIX) 40 MG EC tablet    polyethylene glycol (MIRALAX) 17 g packet    predniSONE (DELTASONE) 5 MG tablet    Rivaroxaban ANTICOAGULANT 15 & 20 MG TBPK Starter Therapy Pack    rosuvastatin (CRESTOR) 20 MG tablet    tacrolimus (PROTOPIC) 0.1 % external ointment     No current facility-administered medications for this visit.     Medication Prescriber:  PCP Aston Clarke  Taking meds as prescribed? Yes  Medication side effects or concerns:  None  Outside medical appointments this week (list provider and reason for visit):  None  Narrative: Patient has a pain of 3/10 due to his hip surgery. Patient shared that if his pain gets worse he can follow up with his PCP. Patient uses a walker to get around. Patient has a Nephrologist for his kidneys and a Hypnologist for Hepatitis B. Patient has no current concern and will reach out to his providers when needed .    Dimension 3: Emotional/Behavioral Conditions & Complications -   Previous Dimension Ratin  Current Dimension Ratin  PHQ9         2023     1:00 PM 2023     2:37 PM 10/26/2023     3:00 PM   PHQ-9 SCORE   PHQ-9 Total Score MyChart  4 (Minimal depression)    PHQ-9 Total Score 15 4 4     GAD7         2023     1:00 PM 10/10/2023     9:16 AM 10/26/2023     3:00 PM   KIM-7 SCORE   Total Score  8 (mild anxiety)    Total Score 9 8 5     Mental health diagnosis Depression and Anxiety  Date of last SIB/SI/HI: N/A  Current MH Assignments:  DA with Jennifre Holliday   Narrative:  Patient reports depression and anxiety are a big factor of his mental health. 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 Patient reports no current therapy services. Patient reports no current SI, SIB, VI, and HI.  Pt. Shared he is feeling \"sheet of paper crumpled up and I am trying to un crumple it\". Pt. Shared he was thrown off due to " "his not moving back to his house due to not having care at home schedule.  Pt. Shared he feels defectiveness within himself and helping others can be a distraction.  Jerad goal for the week is staying sober/ working on emotional sobriety. Jerad shared working on self love. He shared his sexual addiction and engagement with SA, noting that he works on this everyday, noting that what had been understood as a release (porn) is now considered time consuming and against my morals\". Jerad shared he is feeling on the \"monaco scale\" Jerad has been working on rephrasing his mindset into the posative.     Dimension 4: Treatment Acceptance / Resistance -   Previous Dimension Ratin  Current Dimension Ratin  ROBERT Diagnosis:    Stage:  N/A  Commitment to tx process/Stage of change: Preparation  ROBERT assignments:  None  Narrative: Patient is in the preparation stage of change. Patient shared he is wanting to get help and be in treatment. Jerad is working on internalizing some questions he might be avoiding and has been coping his feelings by asking other patients deep questions.       Dimension 5: Relapse / Continued Problem Potential -   Previous Dimension Rating:  3  Current Dimension Rating:  3  Relapses this week:  No  Urges to use:   UA results: No results found for this or any previous visit (from the past 168 hour(s)).  Narrative: Patient lacks coping skills and relapse prevention skills and would benefit from this program. Patient is at high risk for relapse He shared he is working on an emergency medical kit- he stated he felt like he needed some medications that he would consider abusing in this box. We processed addictive thought processes and \"closing the door\" to use. Pt. Shared he \"told on myself about the medical kit\". Pt talked about needing certain pills in it but thinks it is his addictive brain wanting those pills in there. Pt. Shared a trigger for a return to use could me more on his medical health. Jerad " "effective coping skills include \"routine in the morning, including meditation, prayer, reading recovery literature and scripture, connect with others in recovery. Jerad goal is to not isolate himself thus by connecting with his support groups and talking with friends.    Dimension 6: Recovery Environment -   Previous Dimension Ratin  Current Dimension Ratin  Family Involvement : N/A   Summarize attendance at family groups and family sessions: N/A  Family supportive of treatment?  Yes  Community support group attendance: Yes  Recreational activities:   Narrative: Patient has a supportive family and friends. Patient reported being involved with AA and goes to meetings. Patient is currently in a [facility] care unit however he reported being discharged this week and going back home. Patient reported once he is home he will have a PCA. Patient does not have probation or any legal issues. Pt. Shared when he moves back home he lives with a roommate who is also sober. He shared with the group that he passed his [employer] radio test. He is going to a  meeting tonight. Weekend recovery reading, meditation, connect with program people.Jerad returned home and has been adjusting to his newer environment. Jerad is involved to going to (AA/SA) meetings, meditaion, talking to others in recovery,and  reading.    TOPIC  DATES   8 Dimensions of Wellness    Medical Aspects  10/16/23-10/22/23   Mental Health  10/23/23-10/29/23   Acceptance  10/30/23-23   Relationships  2023-23   Structure & Balance  23-23   Relapse Prevention     Coping Skills       Justification for Continued Treatment at this Level of Care:  Patient is at high risk of relapse potential and would benefit from the groups copping skills and relapse prevention skills. Patient has Depression and Anxiety and would benefit from having one on one with Jennifer Holliday.    Discharge Planning:  Target Discharge Date/Timeframe:  3/12/2024   Med " Mgmt Provider/Appt:  PCP   Ind therapy Provider/Appt:  Not at this time   Family therapy Provider/Appt:  N/A   Other referrals:  None    Has vulnerable adult status change? No    Service Coordination:      Supervision:  Patient is staffed with [facility] treatment providers monthly.     Attestation:   Dr. Bhakta - Medical Director - Provides oversight and supervision of care.      Are Treatment Plan goals/objectives effective? Yes  *If no, list changes to treatment plan:    Are the current goals meeting client's needs? Yes  *If no, list the changes to treatment plan.      *Client agrees with any changes to the treatment plan: Yes  *Client received copy of changes: N/A  *Client is aware of right to access a treatment plan review: N/A

## 2023-11-16 NOTE — GROUP NOTE
"Group Therapy Documentation    PATIENT'S NAME: Jerad Ross  MRN:   [id number]  :   1960  ACCT. NUMBER: [id number]  DATE OF SERVICE: 23  START TIME:  9:00 AM  END TIME: 12:00 PM  FACILITATOR(S): Yaritza Lemus [facility] [place]; Mikael Lane [facility] [place]; Joellen Diamond LADC  TOPIC: BEH Group Therapy  Number of patients attending the group:  5  Group Length:  3 Hours    Group Therapy Type: Addiction, Life skill(s), Psychoeducation, Psychotherapeutic, and Skills/Education    Summary of Group / Topics Discussed:    Meditation/Breathing Exercises and Psychoeducation/Skills     Balance in Recovery     Objective(s):  Gain insight into the importance of structure and balance in early recovery     Structure (modalities, homework, worksheets, etc.):  -Discuss how balance has changed in the recovery stages   -Complete balance wheel and share how use impacted lifestyle     Expected therapeutic outcome(s):   - Identify activities each patient would like to engage in   -Identify regular activities needed for wellness     Therapeutic outcome(s) measured by:   -Discussion     Attestation:   Dr. Bhakta - Medical Director - Provides oversight and supervision of care.      Group Attendance:  Attended group session    Patient's response to the group topic/interactions:  cooperative with task    Patient appeared to be Actively participating.        Client specific details:  Jerad shared that his use has been significant and was involved in most activities he was engaged in. Jerad reflected on mindful activities and engaging in things that make him feel well. Jerad shared how today he was \"living the day I have been given\" and this was important as he was experiencing physical pain. Jerad shared no high risk situations and that he plans to attend a SA and AA meeting to connect.     "

## 2023-11-17 ENCOUNTER — VIRTUAL VISIT (OUTPATIENT)
Dept: PSYCHIATRY | Facility: CLINIC | Age: 63
End: 2023-11-17
Attending: NURSE PRACTITIONER
Payer: COMMERCIAL

## 2023-11-17 DIAGNOSIS — F41.1 GENERALIZED ANXIETY DISORDER: Chronic | ICD-10-CM

## 2023-11-17 DIAGNOSIS — F10.20 ALCOHOL USE DISORDER, SEVERE, DEPENDENCE (H): ICD-10-CM

## 2023-11-17 DIAGNOSIS — F33.41 RECURRENT MAJOR DEPRESSIVE DISORDER, IN PARTIAL REMISSION (H): ICD-10-CM

## 2023-11-17 DIAGNOSIS — G47.00 INSOMNIA, UNSPECIFIED TYPE: ICD-10-CM

## 2023-11-17 PROCEDURE — 99442 PR PHYSICIAN TELEPHONE EVALUATION 11-20 MIN: CPT | Mod: 95 | Performed by: NURSE PRACTITIONER

## 2023-11-17 RX ORDER — HYDROXYZINE HYDROCHLORIDE 25 MG/1
25 TABLET, FILM COATED ORAL
Qty: 90 TABLET | Refills: 1 | Status: SHIPPED | OUTPATIENT
Start: 2023-11-17 | End: 2024-02-05

## 2023-11-17 RX ORDER — FLUOXETINE 40 MG/1
40 CAPSULE ORAL DAILY
Qty: 90 CAPSULE | Refills: 1 | Status: SHIPPED | OUTPATIENT
Start: 2023-11-17 | End: 2024-06-27

## 2023-11-17 NOTE — NURSING NOTE
Is the patient currently in the state of [place]? YES    Visit mode:VIDEO    If the visit is dropped, the patient can be reconnected by: TELEPHONE VISIT: Phone number:   Telephone Information:   Mobile [phone]       Will anyone else be joining the visit? NO  (If patient encounters technical issues they should call [phone]    How would you like to obtain your AVS? MyChart    Are changes needed to the allergy or medication list? Pt stated no changes to allergies and Pt stated no med changes    Reason for visit: KURT THORPEF
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
## 2023-11-20 ENCOUNTER — VIRTUAL VISIT (OUTPATIENT)
Dept: ADDICTION MEDICINE | Facility: CLINIC | Age: 63
End: 2023-11-20
Payer: COMMERCIAL

## 2023-11-20 ENCOUNTER — HOSPITAL ENCOUNTER (OUTPATIENT)
Dept: BEHAVIORAL HEALTH | Facility: CLINIC | Age: 63
Discharge: HOME | End: 2023-11-20
Attending: FAMILY MEDICINE
Payer: COMMERCIAL

## 2023-11-20 DIAGNOSIS — G47.00 INSOMNIA, UNSPECIFIED TYPE: ICD-10-CM

## 2023-11-20 DIAGNOSIS — F10.20 ALCOHOL USE DISORDER, SEVERE, DEPENDENCE (H): Primary | ICD-10-CM

## 2023-11-20 DIAGNOSIS — F41.1 GENERALIZED ANXIETY DISORDER: ICD-10-CM

## 2023-11-20 DIAGNOSIS — F10.20 ALCOHOL USE DISORDER, SEVERE, DEPENDENCE (H): ICD-10-CM

## 2023-11-20 DIAGNOSIS — F43.10 PTSD (POST-TRAUMATIC STRESS DISORDER): Primary | ICD-10-CM

## 2023-11-20 PROCEDURE — 99214 OFFICE O/P EST MOD 30 MIN: CPT | Mod: VID | Performed by: NURSE PRACTITIONER

## 2023-11-20 PROCEDURE — H2035 A/D TX PROGRAM, PER HOUR: HCPCS | Mod: HQ

## 2023-11-20 RX ORDER — NALTREXONE HYDROCHLORIDE 50 MG/1
50 TABLET, FILM COATED ORAL DAILY
Qty: 90 TABLET | Refills: 1 | Status: SHIPPED | OUTPATIENT
Start: 2023-11-20 | End: 2024-04-15

## 2023-11-20 ASSESSMENT — PAIN SCALES - GENERAL: PAINLEVEL: NO PAIN (0)

## 2023-11-20 NOTE — GROUP NOTE
"Group Therapy Documentation    PATIENT'S NAME: Jerad Ross  MRN:   [id number]  :   1960  ACCT. NUMBER: [id number]  DATE OF SERVICE: 23  START TIME:  9:00 AM  END TIME: 12:00 PM  FACILITATOR(S): Jennifer Holliday LPC, [facility]  TOPIC: BEH Group Therapy  Co-facilitator: JAMIE Plaza    Number of patients attending the group:  8  Group Length:  3 Hours    Group Therapy Type: Addiction, Psychotherapeutic, and Skills/Education    Summary of Group / Topics Discussed:    Cognitive Therapy Techniques, Co-occurring Illness, Coping Skills/Lifestyle Managemet, Choices in Recovery, Meditation/Breathing Exercises, Proper Nutrition & Exercise, Resentments, Sleep Hygiene, Symptom Management, and Trauma Informed Care, Relapse Prevention    Attestation:   Dr. Bhakta - Medical Director - Provides oversight and supervision of care.    Group Attendance:  Attended group session    Patient's response to the group topic/interactions:  cooperative with task, discussed personal experience with topic, expressed understanding of topic, listened actively, and offered helpful suggestions to peers    Patient appeared to be Actively participating, Attentive, and Engaged.        Client specific details:  Jerad reported continuing abstinence with fleeting thoughts, and no urges or cravings. He expressed gratitude for gratitude lists that help him start out his day in a positive way. Attended AA meeting on Friday night group. On  chose to watch football instead of going to another meeting. Recovery activity for today: make corn casserole for Thanksgiving as my community contribution.  Coping skills utilized: gratitude lists that help him start out his day in a positive way. ments are corrosive to self. [place] for those with whom I struggle.- He noted, \"Something is off today. I have low level irritability-grey day. Unsettled nervousness. I was fighting the meditation.\" Then, he reported his last group will be , as " he will be going to [place] to stay with his mother and sister for an undetermined amount of time (winter?). He indicated he purchased insurance in case he decides to stay.  Affirmation: I am a thinker.   Feelings: low level irritability

## 2023-11-20 NOTE — NURSING NOTE
Is the patient currently in the state of [place]? YES    Visit mode:VIDEO    If the visit is dropped, the patient can be reconnected by: VIDEO VISIT: Text to cell phone:   Telephone Information:   Mobile [phone]       Will anyone else be joining the visit? NO  (If patient encounters technical issues they should call [phone]    How would you like to obtain your AVS? MyChart    Are changes needed to the allergy or medication list? No    Reason for visit: RECHECK    Philip LUNA

## 2023-11-20 NOTE — PROGRESS NOTES
Virtual Visit Details    Type of service:  Video Visit   Video Start Time:  1536  Video End Time:4:03 PM    Originating Location (pt. Location): Home    Distant Location (provider location):  Off-site  Platform used for Video Visit: Lucy

## 2023-11-20 NOTE — PROGRESS NOTES
[facility]    A/P                                                    ASSESSMENT/PLAN  1. Alcohol use disorder, severe, dependence (H)  Controlled with naltrexone 50 mg daily, last drink was 8/5/2023. Is currently in [facility] and will be able to complete phase I before going to [place] for the winter.   Encouraged meeting attendance and resources provided for mobile meeting kiel.   Continue naltrexone, providing 90 day supply with a refill for his travels.   - naltrexone (DEPADE/REVIA) 50 MG tablet; Take 1 tablet (50 mg) by mouth daily  Dispense: 90 tablet; Refill: 1    2. Generalized anxiety disorder  3. Insomnia, unspecified type  Is taking hydroxyzine and prozac prescribed by psychiatry.     Orders Placed This Encounter   Medications    naltrexone (DEPADE/REVIA) 50 MG tablet     Sig: Take 1 tablet (50 mg) by mouth daily     Dispense:  90 tablet     Refill:  1       Problem list updated Nov 20, 2023   No problems updated.          PDMP Review         Value Time User    State PDMP site checked  Yes 11/20/2023  3:37 PM Sarahy Mullins CNP                         07/07/2023 06/29/2023 2 Oxycodone Hcl (Ir) 5 Mg Tablet 28.00 2 Ri Mas [id number] Ali ([id number]) 0/0 105.00 MME Other [place]   06/29/2023 06/29/2023 2 Oxycodone Hcl (Ir) 5 Mg Tablet 30.00 2 Ri Mas [id number] Ali ([id number])             RTC  Return in about 21 weeks (around 4/15/2024) for Follow up, with me, using a video visit at 1100 am.      Counseled the patient on the importance of having a recovery program in addition to medication to manage recovery.  Components include avoiding isolating, having willingness to change, avoiding triggers and managing cravings. Encouraged having some type of sober network and practicing honesty with trusted support person(s). Encouraged other services such as counseling, 12 step or other self-help organizations.      Opioid warning reviewed.  Risk of overdose following a period of abstinence due to  "decrease tolerance was discussed including risk of death.  Strongly recommended abstain from alcohol, benzodiazepines, THC, opioids and other drugs of abuse.  Increased risk of return to opioid use after use of these substances discussed.  Increased risk of overdose/death with use of other substances particularly benzodiazepines/alcohol reviewed.        SUBJECTIVE                                                      Jerad Ross is a 63 year old male with history of HTN, JULIANE, , CAD s/p CABG x3 9/2020, depression with anxiety, and alcohol use who presents for further evaluation of possible substance use disorder and management options.     Brief history: Initial visit 8/14/23. Jerad  is requesting a chemical health assessment to see if he \"should go to treatment of if AA is enough\". He reports that he began drinking 8 shots of whiskey 1-3 times weekly for past 4 months. States his depression and anxiety as well as opiate withdrawal triggered use escalating use. Has been prescribed opioid pain medication on/off since 3/2023 due to right hip pain 2/2 avascular necrosis which was revised 6/2023 and admits to taking more than prescribed on occasion and states that he realized he was taking them sometimes due to his emotions versus physical pain. He has not taken an opioid pain medication for past 3 weeks. States if he ever requires opioid pain medication in future, prefers to be tapered off.      He had a period of 4 years 1743-6958 of total abstinence from alcohol States he went to treatment for \"sexual compulsivity\" followed by a \"sober house\" and he just maintained abstinence after leaving sober house. States he went to sober house because he needed somewhere to go after leaving the [facility]. He is attending a 12 step group for his sexual compulsions. Has recently started attending AA as well.      Remote history of renal failure as an adolescent. Was on dialysis at age 16, and s/p kidney transplant at age 18. States " "he contracted hepatitis B from dialysis,      Substance Use History:   \"Have you ever had any history with [...] use?\" And \"When was your last use?  ALCOHOL - Drinks 8 shots of whiskey 1-3 times weekly for past 4 months, last drink 8/5/23.   CANNABIS - Denies  PRESCRIPTION STIMULANTS (includes Ritalin, Adderall, Vyvanse) - Denies  COCAINE/CRACK - Denies  METH/AMPHETAMINES (includes ecstacy, MDMA/connie) - Denies  OPIATES - Prescribed opiate pain medication on/off since 3/2023 due to right hip pain with revision 6/19/2023. States he took as prescribed mostly, but does admit to taking more than  prescribed on a few occasions. He needs to be tapered off of opioids in future as he did experience opiate w/d and began drinking to help with symptoms. No longer has prescription for opioids.   BENZODIAZEPINES (includes Ativan, Klonopin, Xanax) - Denies  KRATOM (mild opioid and stimulant effects) - Denies  KETAMINE - Denies  HALLUCINOGENS (includes DXM) - Denies  BEHAVIORAL (Gambling, Eating d/o, Compulsivity) - Sexual compulsivirty  History of treatment - Yes the [facility], \"sober house\", and 12 step  NICOTINE  Cigarettes: Denies           Previous withdrawal treatment episodes (e.g. detox): Denies  Previous ROBERT treatment programs: Denies  Hospitalizations or overdose: Denies  Medical complications from substance use: Hx of  renal failure on dialysis at age 16, and kidney transplant, infected with hepatitis B from dialysis. Alcohol impacts his liver.   IV Drug use?: Denies  Previous Medication for Addiction Tx: Denies  Longest period of full abstinence: Almost 4 years 7094-8547  Activities that have previously supported abstinence: Some AA  Current Recovery Activities: AA    Recent HPI Details:10/10/23  -Still in TCU, may be discharging home on Wednesday once PCA services have been arranged.   -Completed robert eval, and will be starting [facility] co-occurring IOP next week.  -Meeting with sponsor weekly and attending online AA " meetings  -Taking hydroxyzine 25 mg at bedtime, sleeping better   -Taking naltrexone 50 mg, denies alcohol cravings. Is experiencing fear that he will drink once he gets home.   -Will be doing PT at [facility]  -Going to [place] in December to spend the Winter with family. Plans to return to [place] in April.      Alcohol use disorder, severe, dependence (H)  Jerad reports cravings are controlled with naltrexone 50 mg daily. He will be starting IOP through  next week.   Continue naltrexone without changes.   -Reinforced plans to start IOP  -Encouraged him  to continue to attend AA, and adding in person meetings once he is home from [facility].   -Continue meetings with sponsor and step work.   -     naltrexone (DEPADE/REVIA) 50 MG tablet; Take 1 tablet (50 mg) by mouth daily  Insomnia, unspecified type  Generalized anxiety disorder  Symptoms improved with hydroxyzine at bedtime. No longer getting during the day for anxiety. Has follow up with psych next to discuss.           TODAY'S VISIT  HPI Nov 20, 2023  -Started IOP at , just about finished with phase I.   -Plans to go to [place] 12/2/23 to visit his mom and sister for the holidays will not be back until sometime in April.   Taking naltrexone 50 mg, denies cravings. Does have occasional thoughts.   Going to AA meetings, mostly online right now.   Denies return to use , last drink 8/5/23.   Physically feeling better since stopping alcohol. Is getting stronger and building up his endurance.   Plans to spend time with sober friend for upcoming holiday.             OBJECTIVE                                                    PHYSICAL EXAM:  There were no vitals taken for this visit.    GENERAL: healthy, alert and no distress  EYES: Eyes grossly normal to inspection, PERRL and conjunctivae and sclerae normal  RESP: No respiratory distress  MENTAL STATUS EXAM  Appearance/Behavior: No appearant distress  Speech: Normal  Mood/Affect: normal affect  Insight:  Adequate    LAB  No results found for any visits on 11/20/23.      HISTORY                                                    Problem list reviewed & adjusted, as indicated.  Patient Active Problem List   Diagnosis    BCC (basal cell carcinoma), trunk    JULIANE (obstructive sleep apnea)    HTN, kidney transplant related    Coronary arteriosclerosis due to lipid rich plaque    NSTEMI (non-ST elevated myocardial infarction) (H)    Depression    Diverticulosis    Hemorrhoids    Kidney replaced by transplant    Basal cell carcinoma    Squamous cell carcinoma    Dyslipidemia    CRP elevated    Glaucoma    AION (acute ischemic optic neuropathy)    Paracentral scotoma    Hip pain    Inflamed seborrheic keratosis    Intertrigo    Chronic hepatitis B (H)    Immunosuppression (H24)    Hypogonadism in male    GERD (gastroesophageal reflux disease)    Aftercare following organ transplant    Acute midline low back pain without sciatica    Septic bursitis    Impaired mobility    CAD -- S/P CABG x 3 in 2020    Abnormal cardiovascular stress test    HTN (hypertension)    End stage renal disease (H)    Hip pain, right    Avascular necrosis of bones of both hips (H)    Chest pain, Probably chest wall in origin    Preoperative examination    Coronary artery disease of native artery of native heart with stable angina pectoris (H24)    Failed total hip arthroplasty, sequela    Generalized muscle weakness    Generalized anxiety disorder    Transient hypotension    Hypokalemia    Alcohol use disorder, severe, dependence (H)    Hypoxia    Multiple subsegmental pulmonary emboli without acute cor pulmonale (H)    Hypomagnesemia    General weakness    Unable to ambulate    Nonadherence to medication         MEDICATION LIST (prior to visit)  acetaminophen (TYLENOL) 500 MG tablet, Take 1,000 mg by mouth 3 times daily as needed for mild pain  albuterol (PROAIR HFA) 108 (90 Base) MCG/ACT inhaler, Inhale 2 puffs into the lungs every 6 hours as needed  for shortness of breath / dyspnea or wheezing  aspirin 81 MG EC tablet, Take 1 tablet (81 mg) by mouth daily  budesonide (PULMICORT FLEXHALER) 90 MCG/ACT inhaler, Inhale 2 puffs into the lungs 2 times daily as needed (shortness of breath)  entecavir (BARACLUDE) 0.5 MG tablet, Take 1 tablet (0.5 mg) by mouth daily  FLUoxetine (PROZAC) 20 MG capsule, Take 1 capsule (20 mg) by mouth daily With 40mg for a total daily dose of 60mg  FLUoxetine (PROZAC) 40 MG capsule, Take 1 capsule (40 mg) by mouth daily  fluticasone-salmeterol (ADVAIR) 250-50 MCG/ACT inhaler, Inhale 1 puff into the lungs 2 times daily  furosemide (LASIX) 20 MG tablet, Take 1 tablet (20 mg) by mouth daily as needed (fluid retention)  hydrOXYzine (ATARAX) 10 MG tablet, Take 1 tablet (10 mg) by mouth daily as needed for anxiety  hydrOXYzine (ATARAX) 25 MG tablet, Take 1 tablet (25 mg) by mouth nightly as needed for anxiety (sleep)  isosorbide mononitrate (IMDUR) 60 MG 24 hr tablet, Take 60 mg by mouth daily  latanoprost (XALATAN) 0.005 % ophthalmic solution, Place 1 drop into both eyes At Bedtime  metoprolol succinate ER (TOPROL XL) 25 MG 24 hr tablet, Take 0.5 tablets (12.5 mg) by mouth daily  Multiple Vitamins-Iron (ONE DAILY MULTIVITAMIN/IRON) TABS, Take 1 tablet by mouth daily  mycophenolate (GENERIC EQUIVALENT) 250 MG capsule, Take 3 capsules (750 mg) by mouth 2 times daily  nitroGLYcerin (NITROSTAT) 0.4 MG sublingual tablet, Place 1 tablet (0.4 mg) under the tongue every 5 minutes as needed for chest pain  Omega-3 Fatty Acids (OMEGA 3 PO), Take 1 capsule by mouth daily Dose unknown  pantoprazole (PROTONIX) 40 MG EC tablet, Take 1 tablet (40 mg) by mouth daily  polyethylene glycol (MIRALAX) 17 g packet, Take 17 g by mouth daily as needed   predniSONE (DELTASONE) 5 MG tablet, Take 1 tablet (5 mg) by mouth daily  Rivaroxaban ANTICOAGULANT 15 & 20 MG TBPK Starter Therapy Pack, Take 15 mg by mouth 2 times daily (with meals) for 21 days, THEN 20 mg daily  with food for 9 days.  rosuvastatin (CRESTOR) 20 MG tablet, TAKE 1 TABLET(20 MG) BY MOUTH DAILY  tacrolimus (PROTOPIC) 0.1 % external ointment, Apply topically 2 times daily as needed    No current facility-administered medications on file prior to visit.      MEDICATION LIST (after visit)  Current Outpatient Medications   Medication    naltrexone (DEPADE/REVIA) 50 MG tablet    acetaminophen (TYLENOL) 500 MG tablet    albuterol (PROAIR HFA) 108 (90 Base) MCG/ACT inhaler    aspirin 81 MG EC tablet    budesonide (PULMICORT FLEXHALER) 90 MCG/ACT inhaler    entecavir (BARACLUDE) 0.5 MG tablet    FLUoxetine (PROZAC) 20 MG capsule    FLUoxetine (PROZAC) 40 MG capsule    fluticasone-salmeterol (ADVAIR) 250-50 MCG/ACT inhaler    furosemide (LASIX) 20 MG tablet    hydrOXYzine (ATARAX) 10 MG tablet    hydrOXYzine (ATARAX) 25 MG tablet    isosorbide mononitrate (IMDUR) 60 MG 24 hr tablet    latanoprost (XALATAN) 0.005 % ophthalmic solution    metoprolol succinate ER (TOPROL XL) 25 MG 24 hr tablet    Multiple Vitamins-Iron (ONE DAILY MULTIVITAMIN/IRON) TABS    mycophenolate (GENERIC EQUIVALENT) 250 MG capsule    nitroGLYcerin (NITROSTAT) 0.4 MG sublingual tablet    Omega-3 Fatty Acids (OMEGA 3 PO)    pantoprazole (PROTONIX) 40 MG EC tablet    polyethylene glycol (MIRALAX) 17 g packet    predniSONE (DELTASONE) 5 MG tablet    Rivaroxaban ANTICOAGULANT 15 & 20 MG TBPK Starter Therapy Pack    rosuvastatin (CRESTOR) 20 MG tablet    tacrolimus (PROTOPIC) 0.1 % external ointment     No current facility-administered medications for this visit.         Allergies   Allergen Reactions    Penicillins Shortness Of Breath and Hives    Keflex [Cephalexin Hcl] Unknown     Patient could not recall reaction    Sulfa Antibiotics Rash    Tetracycline Unknown     Patient could not recall reaction           Sarahy Mullins, DNP,MSN, AGNP-C  [facility]   [address], [address]   [place] [address]   Phone #  [phone]

## 2023-11-20 NOTE — ADDENDUM NOTE
Encounter addended by: Jennifer Holliday, ARSENIOC, LADC on: 11/20/2023 5:58 PM   Actions taken: Clinical Note Signed

## 2023-11-20 NOTE — PATIENT INSTRUCTIONS
Patient Education   Addiction Medicine  What to Expect  Here's what to expect from our Addiction Medicine program.  About Addiction Medicine  Addiction Medicine clinics help you with substance use problems. You set your own goals. We try to help you reach your goals. Your care plan can include:  Medicine  Creating a recovery plan  Helping you find local resources  Helping with treatment options  Clinic phone number and addresses  Clinic Phone: [phone]  Mental Health and Addiction Clinic  [facility]  [address], [address]  [place] [address]  [facility]  [address], [address]  [place] [address]  Walk-in services  We offer walk-in care for patients at the Recovery Clinic. This is only for patients with Opioid Use Disorder (OUD). Anyone with OUD is welcome. Our providers will refer you to the Recovery Clinic if you're struggling to keep up with your medicines or appointments.  Recovery Clinic ([facility])  [address], [address]  [place] [address]  Phone: [phone]  The Recovery Clinic is open for walk-ins Monday to Friday 9 a.m. to 11:30 a.m. and 12:30 p.m. to 3 p.m.  How it works  Come to your visits every time. The treatment works better when you do.   You can have as many visits as you need. When you're better, we'll refer you back to being cared for by your family doctor.   If you need it, we'll send you to doctors, psychiatrists, therapists, and other providers. We focus on treating addiction. We don't treat other problems, like managing other medicines or non-addiction issues.  About visits  Urine drug testing  We'll often test your pee (urine) for drugs. This is the only way we can know for sure whether or not you're using drugs. It helps us treat you without judgement.   Suboxone (buprenorphine)  If you're taking buprenorphine, you'll have a lot of visits at first. If your problem is getting worse, or you're  "using substances, we may schedule you for extra visits.   Cancelling visits  If you can't come to your visit, please call us right away at [phone]. If you don't cancel at least 24 hours (1 full day) before your visit, that's \"late cancellation.\"  Being late to visits  If you come late, you may not be seen. This will count as a \"no-show.\"  Please call the clinic if you're running late. This will help us plan, but it doesn't mean you'll be seen.   Being late is:  More than 15 minutes late for a return visit.  More than 30 minutes late for your first visit.  If you cancel late or don't show up 2 times within 6 months, we may transfer you to another clinic.   Getting help between visits  If you need help between visits, you can call us Monday to Friday from 8 a.m. to 4:30 p.m. at [phone]. You can also send us a message on [employer].  Medicine refills  If you miss or cancel a visit, you can still ask for a refill. But we can only refill your medicines if you've made a new appointment.  Please call your pharmacy for medicine refills. If you have a question about your refill, call us at [phone].  It takes up to 2 business days to refill your drugs. Let us know 2 to 3 days before you run out. Don't call more than 1 week before you run out. That's too early.   Please make sure we have your right phone number.  If we have a problem with your refill, we'll call you. If we call you, please call us back right away. If you don't, you may not get your medicines quickly.   Call your pharmacy to find out if your medicines are ready.   Keep your medicines in a safe place. Keep them away from pets and children. If your medicines are lost or stolen, we usually don't replace them. We recommend you file a police report if your medicines are stolen. Your insurance may not pay for early refills, even if you have a prescription.  Forms  Please give us at least 3 business days to fill out any forms. Bring the forms to your " visits if you can. We may refer you to other members of your care team to complete the forms.   Emergency care   Call 911 or go to the nearest emergency room if your life or someone else's life is in danger.  Call 988 anytime for the Suicide and Crisis Lifeline.  If you need care when we're closed, call your family doctor to see if they can help. You can also go to urgent care or an emergency room. [facility] emergency rooms may be able to give you buprenorphine or other medicine refills.  Thank you for choosing us for your care.  For informational purposes only. Not to replace the advice of your health care provider. Copyright   2023 [facility]. All rights reserved. [employer] [id number] - REV 05/23.

## 2023-11-22 ENCOUNTER — DOCUMENTATION ONLY (OUTPATIENT)
Dept: BEHAVIORAL HEALTH | Facility: CLINIC | Age: 63
End: 2023-11-22

## 2023-11-22 ENCOUNTER — HOSPITAL ENCOUNTER (OUTPATIENT)
Dept: BEHAVIORAL HEALTH | Facility: CLINIC | Age: 63
Discharge: HOME | End: 2023-11-22
Attending: FAMILY MEDICINE
Payer: COMMERCIAL

## 2023-11-22 DIAGNOSIS — F10.90 ALCOHOL USE DISORDER: Primary | ICD-10-CM

## 2023-11-22 PROCEDURE — H2035 A/D TX PROGRAM, PER HOUR: HCPCS | Mod: HQ

## 2023-11-22 NOTE — GROUP NOTE
"Group Therapy Documentation    PATIENT'S NAME: Jerad Ross  MRN:   [id number]  :   1960  ACCT. NUMBER: [id number]  DATE OF SERVICE: 23  START TIME:  9:00 AM  END TIME: 12:00 PM  FACILITATOR(S): Joellen Diamond LADC  TOPIC: BEH Group Therapy  Number of patients attending the group:  6  Group Length:  3 Hours    Group Therapy Type: Addiction and Skills/Education    Summary of Group / Topics Discussed:    Boundaries, Coping Skills/Lifestyle Managemet, Journaling, and Meditation/Breathing Exercises  Attestation:   Dr. Bhakta - Medical Director - Provides oversight and supervision of care.       Group Attendance:  Attended group session    Patient's response to the group topic/interactions:  gave appropriate feedback to peers and listened actively    Patient appeared to be Engaged.        Client specific details:  Jerad shared high risk emotions of feeling \"comparing to other and feeling less than\" Jerad shared he is going to get out of his comfort zone and talk with people. Jerad shared he is going to his friends place that is sober and he is attending a meeting after his friends place. Jerad shared his is bringing a dish and that it will be helpful to talk to people. Jerad shared that he enjoys AA and that it is helpful for his recovery but he wants to make sure that he is not losing himself by just saying his life is all about recovery.    "

## 2023-11-22 NOTE — PROGRESS NOTES
"[facility] Weekly Treatment Plan Review      ATTENDANCE for the following date span:  23-23    Date     Group Therapy 3  hours 0  hours 3  hours    hours No group    Individual Therapy        Family Therapy        Other (Specify) Phase 1 &2  Phase 3  Phase 1  Phase 1 & 2        Patient did not have any absences during this time period.    Total hours: 48. Patient is in phase 1.     Weekly Treatment Plan Review     Treatment Plan initiated on: 10/10/2023.    Dimension1: Acute Intoxication/Withdrawal Potential -   Previous Dimension Ratin  Current Dimension Ratin  Date of Last Use: Alcohol-2023 and Oxycodone the \"end of July\"   Any reports of withdrawal symptoms: No  Narrative: Patient shows no withdrawal potential during Intake. Patient date of last use of alcohol is on 2023 and Oxycodone the \"end of July\". No changes    Dimension 2: Biomedical Conditions & Complications -   Previous Dimension Ratin  Current Dimension Ratin  Medical Concerns:  None at this time  Current Medications & Medication Changes:  Current Outpatient Medications   Medication    acetaminophen (TYLENOL) 500 MG tablet    albuterol (PROAIR HFA) 108 (90 Base) MCG/ACT inhaler    aspirin 81 MG EC tablet    budesonide (PULMICORT FLEXHALER) 90 MCG/ACT inhaler    entecavir (BARACLUDE) 0.5 MG tablet    FLUoxetine (PROZAC) 20 MG capsule    FLUoxetine (PROZAC) 40 MG capsule    fluticasone-salmeterol (ADVAIR) 250-50 MCG/ACT inhaler    furosemide (LASIX) 20 MG tablet    hydrOXYzine (ATARAX) 10 MG tablet    hydrOXYzine (ATARAX) 25 MG tablet    isosorbide mononitrate (IMDUR) 60 MG 24 hr tablet    latanoprost (XALATAN) 0.005 % ophthalmic solution    metoprolol succinate ER (TOPROL XL) 25 MG 24 hr tablet    Multiple Vitamins-Iron (ONE DAILY MULTIVITAMIN/IRON) TABS    mycophenolate (GENERIC EQUIVALENT) 250 MG capsule    naltrexone (DEPADE/REVIA) 50 MG tablet    " "nitroGLYcerin (NITROSTAT) 0.4 MG sublingual tablet    Omega-3 Fatty Acids (OMEGA 3 PO)    pantoprazole (PROTONIX) 40 MG EC tablet    polyethylene glycol (MIRALAX) 17 g packet    predniSONE (DELTASONE) 5 MG tablet    Rivaroxaban ANTICOAGULANT 15 & 20 MG TBPK Starter Therapy Pack    rosuvastatin (CRESTOR) 20 MG tablet    tacrolimus (PROTOPIC) 0.1 % external ointment     No current facility-administered medications for this visit.     Medication Prescriber:  PCP Aston Clarke  Taking meds as prescribed? Yes  Medication side effects or concerns:  None  Outside medical appointments this week (list provider and reason for visit):  None  Narrative: Patient has a pain of 3/10 due to his hip surgery. Patient shared that if his pain gets worse he can follow up with his PCP. Patient uses a walker to get around. Patient has a Nephrologist for his kidneys and a Hypnologist for Hepatitis B. Patient has no current concern and will reach out to his providers when needed .    Dimension 3: Emotional/Behavioral Conditions & Complications -   Previous Dimension Ratin  Current Dimension Ratin  PHQ9         2023     1:00 PM 2023     2:37 PM 10/26/2023     3:00 PM   PHQ-9 SCORE   PHQ-9 Total Score MyChart  4 (Minimal depression)    PHQ-9 Total Score 15 4 4     GAD7         2023     1:00 PM 10/10/2023     9:16 AM 10/26/2023     3:00 PM   KIM-7 SCORE   Total Score  8 (mild anxiety)    Total Score 9 8 5     Mental health diagnosis Depression and Anxiety  Date of last SIB/SI/HI: N/A  Current MH Assignments:  DA with Jennifer Holliday   Narrative:  Patient reports depression and anxiety are a big factor of his mental health. 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 Patient reports no current therapy services. Patient reports no current SI, SIB, VI, and HI.  Pt. Shared he is feeling \"sheet of paper crumpled up and I am trying to un crumple it\".  Jerad goal for the week is staying " "sober/ working on emotional sobriety. Jerad shared working on self love. He shared his sexual addiction and engagement with SA, noting that he works on this everyday, noting that what had been understood as a release (porn) is now considered time consuming and against my morals\". Jerad shared he is feeling on the \"monaco scale\" Jerad has been working on rephrasing his mindset into the positive. Jerad reports being triggered by a peers family members living situation. He reports he feels like he needs to engage himself despite knowing it likely is not beneficial for him.   Dimension 4: Treatment Acceptance / Resistance -   Previous Dimension Ratin  Current Dimension Ratin  ROBERT Diagnosis:    Stage:  N/A  Commitment to tx process/Stage of change: Preparation  ROBERT assignments:  None  Narrative: Patient is in the preparation stage of change. Patient shared he is wanting to get help and be in treatment. Jerad is working on internalizing some questions he might be avoiding and has been coping his feelings by asking other patients deep questions. Jerad reported that he is planning on wintering in [place] with his mother and sister and has a flight booked for Dec. 2nd. He reports his tentative last day is  though reports that he purchased flight insurance on his ticket so that he may cancel.      Dimension 5: Relapse / Continued Problem Potential -   Previous Dimension Rating:  3  Current Dimension Rating:  3  Relapses this week:  No  Urges to use:   UA results: No results found for this or any previous visit (from the past 168 hour(s)).  Narrative: Patient lacks coping skills and relapse prevention skills and would benefit from this program. Patient is at high risk for relapse He shared he is working on an emergency medical kit- he stated he felt like he needed some medications that he would consider abusing in this box. We processed addictive thought processes and \"closing the door\" to use. Pt. Shared " "he \"told on myself about the medical kit\". Pt talked about needing certain pills in it but thinks it is his addictive brain wanting those pills in there. Pt. Shared a trigger for a return to use could me more on his medical health. Jerad effective coping skills include \"routine in the morning, including meditation, prayer, reading recovery literature and scripture, connect with others in recovery. Jerad goal is to not isolate himself thus by connecting with his support groups and talking with friends.     Dimension 6: Recovery Environment -   Previous Dimension Ratin  Current Dimension Ratin  Family Involvement : N/A   Summarize attendance at family groups and family sessions: N/A  Family supportive of treatment?  Yes  Community support group attendance: Yes  Recreational activities:   Narrative: Patient has a supportive family and friends. Patient reported being involved with AA and goes to meetings. Patient is currently in a [facility] care unit however he reported being discharged this week and going back home. Patient reported once he is home he will have a PCA. Patient does not have probation or any legal issues. Pt. Shared when he moves back home he lives with a roommate who is also sober. He shared with the group that he passed his [employer] radio test. He is going to a  meeting [employer]. Weekend recovery reading, meditation, connect with program people.Jerad returned home and has been adjusting to his newer environment. Jerad is involved to going to (AA/SA) meetings, meditaion, talking to others in recovery,and  reading. Jerad reported that he is planning on wintering in [place] with his mother and sister and has a flight booked for Dec. 2nd. He reports his tentative last day is  though reports that he purchased flight insurance on his ticket so that he may cancel.    TOPIC  DATES   8 Dimensions of Wellness    Medical Aspects  10/16/23-10/22/23   Mental Health  10/23/23-10/29/23 "   Acceptance  10/30/23-11/5/23   Relationships  11/6/2023-11/12/23   Structure & Balance  11/13/23-11/19/23   Relapse Prevention     Coping Skills       Justification for Continued Treatment at this Level of Care:  Patient is at high risk of relapse potential and would benefit from the groups copping skills and relapse prevention skills. Patient has Depression and Anxiety and would benefit from having one on one with Jennifer Holliday.    Discharge Planning:  Target Discharge Date/Timeframe:  3/12/2024   Med Mgmt Provider/Appt:  PCP   Ind therapy Provider/Appt:  Not at this time   Family therapy Provider/Appt:  N/A   Other referrals:  None    Has vulnerable adult status change? No    Service Coordination:      Supervision:  Patient is staffed with [facility] treatment providers monthly.     Attestation:   Dr. Bhakta - Medical Director - Provides oversight and supervision of care.      Are Treatment Plan goals/objectives effective? Yes  *If no, list changes to treatment plan:    Are the current goals meeting client's needs? Yes  *If no, list the changes to treatment plan.      *Client agrees with any changes to the treatment plan: Yes  *Client received copy of changes: N/A  *Client is aware of right to access a treatment plan review: N/A

## 2023-11-25 NOTE — ADDENDUM NOTE
Encounter addended by: Jennifer Holliday, ARSENIOC, LADC on: 11/25/2023 10:48 AM   Actions taken: Clinical Note Signed

## 2023-11-25 NOTE — PROGRESS NOTES
"Attestation:   Dr. Bhakta - Medical Director - Provides oversight and supervision of care.    Individual Session Summary (MICD)   DATE:  11/30/2023  START TIME: 2:15 PM   END TIME: 2:50 PM   Duration: 35 minutes    ROBERT Diagnosis: (F10.20) Alcohol Use Disorder  Mental Health Diagnosis: (F43.10) PTSD     Data:  Final individual session to address co-occurring mental health and substance use disorders, CADI services, and plan [place] in [place] in [place], for the [place].  The session focused on client's thoughts and feelings regarding the treatment program, group, maintaining abstinence, and managing mental health symptoms.     Client endorsed passive suicidal ideation with no plan or intent. He denied, homicidal, self-harm, or violent thoughts, plans, or intent at this appointment.     Intervention: Utilized motivational interviewing to assess for stage of change and overall wellbeing. Provided verbal interventions including validation, support, and psycho-education. Provider used various therapeutic techniques including Narrative CBT and Twelve-Step Facilitation with Motivational Enhancement Therapy.  Writer utilized a strengths-based approach and trauma informed care (TIC). Discussed \"boredom\" as a risk factor for return to use. Writer emailed a follow up closing email re: boredom and SMART Recovery toolbox. Reviewed relapse prevention plan (skills and practices, med adherence, continued AA meetings, sponsor contact, and  meet-up sober activities or community education classes for meaningful activity.  Reviewed progress and voiced hope for a meaningful life in recovery.     Assessment:  Jerad endorsed passive suicidal ideation with no plan or intent. He denied, homicidal, self-harm, or violent thoughts, plans, or intent at this appointment. He idenitified building his \"kit\", acceptance of \"not knowing what the future will bring\" and attending meetings will help to keep him in the present. He " reported feeling good about his trip to [place] for the winter. He articulated skills, practices and tools for continuing recovery. He noted he has 2 things to come home to in [place], including potentially engaging in OP at [place].         9/12/2023     1:00 PM 9/28/2023     2:37 PM 10/26/2023     3:00 PM   PHQ-9 SCORE   PHQ-9 Total Score MyChart  4 (Minimal depression)    PHQ-9 Total Score 15 4 4         9/12/2023     1:00 PM 10/10/2023     9:16 AM 10/26/2023     3:00 PM   KIM-7 SCORE   Total Score  8 (mild anxiety)    Total Score 9 8 5   11/7/2023 PCL score: 29     PlanJesse Mars will close with IOP program today, after completing 59 hours in Phase 1 (60 hours). He is going to [place] for  the winter as he is a fall risk. He will be staying with his mother and sister, who he hasn't seen in 9 years. He closed with the group and this writer today. See discharge summary in EHR.    Jennifer Holliday, LPCC, LADC

## 2023-11-27 ENCOUNTER — HOSPITAL ENCOUNTER (OUTPATIENT)
Dept: BEHAVIORAL HEALTH | Facility: CLINIC | Age: 63
Discharge: HOME | End: 2023-11-27
Attending: FAMILY MEDICINE
Payer: COMMERCIAL

## 2023-11-27 DIAGNOSIS — F43.10 PTSD (POST-TRAUMATIC STRESS DISORDER): Primary | ICD-10-CM

## 2023-11-27 DIAGNOSIS — F10.20 ALCOHOL USE DISORDER, SEVERE, DEPENDENCE (H): ICD-10-CM

## 2023-11-27 PROCEDURE — H2035 A/D TX PROGRAM, PER HOUR: HCPCS | Mod: HQ

## 2023-11-27 NOTE — GROUP NOTE
"Group Therapy Documentation    PATIENT'S NAME: Jerad Ross  MRN:   [id number]  :   1960  ACCT. NUMBER: [id number]  DATE OF SERVICE: 23  START TIME:  9:00 AM  END TIME: 12:00 PM  FACILITATOR(S): Jennifer Holliday, [facility], [facility]; Mikael Lane, [facility]  TOPIC: BEH Group Therapy  Number of patients attending the group:  4  Group Length:  3 Hours    Group Therapy Type: Addiction, Psychotherapeutic, and Skills/Education    Summary of Group / Topics Discussed:  Cognitive Therapy Techniques, Co-occurring Illness, Coping Skills/Lifestyle Managemet, Choices in Recovery, Distress Tolerance, Emotional Regulation, Meditation/Breathing Exercises, Relationships, Sleep Hygiene, Symptom Management, Thinking Errors/Negative Self-Talk, and Trauma Informed Care    Attestation:   Dr. Bhakta - Medical Director - Provides oversight and supervision of care.       Group Attendance:  Attended group session    Patient's response to the group topic/interactions:  cooperative with task, discussed personal experience with topic, expressed understanding of topic, gave appropriate feedback to peers, listened actively, and offered helpful suggestions to peers    Patient appeared to be Actively participating, Attentive, and Engaged.       Client specific details:  Jerad reported continuing abstinence since last group and expressed gratitude for being here, \"having a place to go\", noting that he is not working and will be moving to [place] with last group to be . He was open to ideas to have \"places to go\" in [place]. He reported non-motivating thoughts about alcohol and noted, \"I tell on myself by sharing it with my sponsor or another so it doesn't hang around unnoticed. Sunlight is the best disinfectant.   Effective coping skills used: guided meditation in afternoons instead of a nap. Went to AA and SA meetings, fellowship and sponsor. AA sponsor by phone in [place]. Recovery related activity today: Commit to review at the end of the day and " share (Was I resentful, fear, dishonest, shame?).   Affirmation: I have reliable tools to help with recovery.  Feelings; slightly anxious due to travelling coming up.

## 2023-11-29 ENCOUNTER — HOSPITAL ENCOUNTER (OUTPATIENT)
Dept: BEHAVIORAL HEALTH | Facility: CLINIC | Age: 63
Discharge: HOME | End: 2023-11-29
Attending: FAMILY MEDICINE
Payer: COMMERCIAL

## 2023-11-29 DIAGNOSIS — F10.20 ALCOHOL USE DISORDER, SEVERE, DEPENDENCE (H): Primary | ICD-10-CM

## 2023-11-29 PROCEDURE — H2035 A/D TX PROGRAM, PER HOUR: HCPCS | Mod: HQ

## 2023-11-29 NOTE — GROUP NOTE
Group Therapy Documentation    PATIENT'S NAME: Jerad Ross  MRN:   [id number]  :   1960  ACCT. NUMBER: [id number]  DATE OF SERVICE: 23  START TIME:  9:00 AM  END TIME: 12:00 PM  FACILITATOR(S): Yaritza Lemus LADC; Joellen Diamond LADC  TOPIC: BEH Group Therapy  Number of patients attending the group:  8  Group Length:  3 Hours    Group Therapy Type: Addiction, Psychoeducation, and Psychotherapeutic    Summary of Group / Topics Discussed:    Coping Skills/Lifestyle Managemet, Choices in Recovery, Journaling, Meditation/Breathing Exercises, and Mindfulness    Attestation:   Dr. Bhakta - Medical Director - Provides oversight and supervision of care.    Group Attendance:  Attended group session    Patient's response to the group topic/interactions:  cooperative with task and discussed personal experience with topic    Patient appeared to be Actively participating, Attentive, and Engaged.        Client specific details:  Jerad shared that he is feeling robby, hope, optimism and excited about his transition to [place]. Jerad states that he has been journaling about his move and feels positive about that. Jerad shared that he is having some mild anxiety when waking up, he was going to try listening to a favorite song as part of his morning routine and getting ready.

## 2023-11-30 ENCOUNTER — DOCUMENTATION ONLY (OUTPATIENT)
Dept: BEHAVIORAL HEALTH | Facility: CLINIC | Age: 63
End: 2023-11-30

## 2023-11-30 ENCOUNTER — HOSPITAL ENCOUNTER (OUTPATIENT)
Dept: BEHAVIORAL HEALTH | Facility: CLINIC | Age: 63
Discharge: HOME | End: 2023-11-30
Attending: FAMILY MEDICINE
Payer: COMMERCIAL

## 2023-11-30 ENCOUNTER — TELEPHONE (OUTPATIENT)
Dept: BEHAVIORAL HEALTH | Facility: CLINIC | Age: 63
End: 2023-11-30

## 2023-11-30 DIAGNOSIS — F10.90 ALCOHOL USE DISORDER: Primary | ICD-10-CM

## 2023-11-30 PROCEDURE — H2035 A/D TX PROGRAM, PER HOUR: HCPCS

## 2023-11-30 PROCEDURE — H2035 A/D TX PROGRAM, PER HOUR: HCPCS | Mod: HQ

## 2023-11-30 NOTE — PROGRESS NOTES
"[facility] Weekly Treatment Plan Review      ATTENDANCE for the following date span:  23-12/3/23    Date     Group Therapy 3  hours 0  hours 3  hours 3  hours No group    Individual Therapy    1    Family Therapy        Other (Specify) Phase 1 &2  Phase 3  Phase 1  Phase 1 & 2        Patient did not have any absences during this time period.    Total hours: 58. Patient is in phase 1.     Weekly Treatment Plan Review     Treatment Plan initiated on: 10/10/2023.    Dimension1: Acute Intoxication/Withdrawal Potential -   Previous Dimension Ratin  Current Dimension Ratin  Date of Last Use: Alcohol-2023 and Oxycodone the \"end of July\"   Any reports of withdrawal symptoms: No  Narrative: Patient shows no withdrawal potential during Intake. Patient date of last use of alcohol is on 2023 and Oxycodone the \"end of July\". No changes    Dimension 2: Biomedical Conditions & Complications -   Previous Dimension Ratin  Current Dimension Ratin  Medical Concerns:  None at this time  Current Medications & Medication Changes:  Current Outpatient Medications   Medication    acetaminophen (TYLENOL) 500 MG tablet    albuterol (PROAIR HFA) 108 (90 Base) MCG/ACT inhaler    aspirin 81 MG EC tablet    budesonide (PULMICORT FLEXHALER) 90 MCG/ACT inhaler    entecavir (BARACLUDE) 0.5 MG tablet    FLUoxetine (PROZAC) 20 MG capsule    FLUoxetine (PROZAC) 40 MG capsule    fluticasone-salmeterol (ADVAIR) 250-50 MCG/ACT inhaler    furosemide (LASIX) 20 MG tablet    hydrOXYzine (ATARAX) 10 MG tablet    hydrOXYzine (ATARAX) 25 MG tablet    isosorbide mononitrate (IMDUR) 60 MG 24 hr tablet    latanoprost (XALATAN) 0.005 % ophthalmic solution    metoprolol succinate ER (TOPROL XL) 25 MG 24 hr tablet    Multiple Vitamins-Iron (ONE DAILY MULTIVITAMIN/IRON) TABS    mycophenolate (GENERIC EQUIVALENT) 250 MG capsule    naltrexone (DEPADE/REVIA) 50 MG tablet    " "nitroGLYcerin (NITROSTAT) 0.4 MG sublingual tablet    Omega-3 Fatty Acids (OMEGA 3 PO)    pantoprazole (PROTONIX) 40 MG EC tablet    polyethylene glycol (MIRALAX) 17 g packet    predniSONE (DELTASONE) 5 MG tablet    Rivaroxaban ANTICOAGULANT 15 & 20 MG TBPK Starter Therapy Pack    rosuvastatin (CRESTOR) 20 MG tablet    tacrolimus (PROTOPIC) 0.1 % external ointment     No current facility-administered medications for this visit.     Medication Prescriber:  PCP Aston Clarke  Taking meds as prescribed? Yes  Medication side effects or concerns:  None  Outside medical appointments this week (list provider and reason for visit):  None  Narrative: Patient has a pain of 3/10 due to his hip surgery. Patient shared that if his pain gets worse he can follow up with his PCP. Patient uses a walker to get around. Patient has a Nephrologist for his kidneys and a Hypnologist for Hepatitis B. Patient has no current concern and will reach out to his providers when needed .    Dimension 3: Emotional/Behavioral Conditions & Complications -   Previous Dimension Ratin  Current Dimension Ratin  PHQ9         2023     1:00 PM 2023     2:37 PM 10/26/2023     3:00 PM   PHQ-9 SCORE   PHQ-9 Total Score MyChart  4 (Minimal depression)    PHQ-9 Total Score 15 4 4     GAD7         2023     1:00 PM 10/10/2023     9:16 AM 10/26/2023     3:00 PM   KIM-7 SCORE   Total Score  8 (mild anxiety)    Total Score 9 8 5     Mental health diagnosis Depression and Anxiety  Date of last SIB/SI/HI: N/A  Current MH Assignments:  DA with Jennifer Holliday   Narrative:  Patient reports depression and anxiety are a big factor of his mental health. 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 Patient reports no current therapy services. Patient reports no current SI, SIB, VI, and HI.  Pt. Shared he is feeling \"sheet of paper crumpled up and I am trying to un crumple it\".  Jerad goal for the week is staying " "sober/ working on emotional sobriety. Jerad shared working on self love.  Jerad shared feeling anxious about moving to [place] and flying.  Jerad shared that he is having some mild anxiety when waking up, he was going to try listening to a favorite song as part of his morning routine and getting ready.    Dimension 4: Treatment Acceptance / Resistance -   Previous Dimension Ratin  Current Dimension Ratin  ROBERT Diagnosis:    Stage:  N/A  Commitment to tx process/Stage of change: Preparation  ROBERT assignments:  None  Narrative: Patient is in the preparation stage of change. Patient shared he is wanting to get help and be in treatment. Jerad is working on internalizing some questions he might be avoiding and has been coping his feelings by asking other patients deep questions. Jerad reported that he is planning on wintering in [place] with his mother and sister and has a flight booked for Dec. 2nd. He reports his tentative last day is  though reports that he purchased flight insurance on his ticket so that he may cancel.      Dimension 5: Relapse / Continued Problem Potential -   Previous Dimension Rating:  3  Current Dimension Rating:  3  Relapses this week:  No  Urges to use:   UA results: No results found for this or any previous visit (from the past 168 hour(s)).  Narrative: Patient lacks coping skills and relapse prevention skills and would benefit from this program. Patient is at high risk for relapse He shared he is working on an emergency medical kit- he stated he felt like he needed some medications that he would consider abusing in this box. We processed addictive thought processes and \"closing the door\" to use. Pt. Shared he \"told on myself about the medical kit\". Pt talked about needing certain pills in it but thinks it is his addictive brain wanting those pills in there. Pt. Shared a trigger for a return to use could me more on his medical health. Jerad effective coping skills " "include \"routine in the morning, including meditation, prayer, reading recovery literature and scripture, connect with others in recovery. Jerad goal is to not isolate himself thus by connecting with his support groups and talking with friends. Jerad states that he has been journaling about his move and feels positive. He reported non-motivating thoughts about alcohol and noted, \"I tell on myself by sharing it with my sponsor or another so it doesn't hang around unnoticed.      Dimension 6: Recovery Environment -   Previous Dimension Ratin  Current Dimension Ratin  Family Involvement : N/A   Summarize attendance at family groups and family sessions: N/A  Family supportive of treatment?  Yes  Community support group attendance: Yes  Recreational activities:   Narrative: Patient has a supportive family and friends. Patient reported being involved with AA and goes to meetings. Patient is currently in a [facility] care unit however he reported being discharged this week and going back home. Patient reported once he is home he will have a PCA. Patient does not have probation or any legal issues. Pt. Shared when he moves back home he lives with a roommate who is also sober. He shared with the group that he passed his [employer] radio test. He is going to a  meeting fotobabble. Weekend recovery reading, meditation, connect with program people.Jerad returned home and has been adjusting to his newer environment. Jerad is involved to going to (AA/SA) meetings, meditaion, talking to others in recovery,and  reading. Jerad reported that he is planning on wintering in [place] with his mother and sister and has a flight booked for Dec. 2nd. He reports his tentative last day is  though reports that he purchased flight insurance on his ticket so that he may cancel. Jerad shared that his goal is find a meeting in [place] and to keep up with his zoom meetings. Jerad shared that it is important for him to find a " sober community and to get involved.     TOPIC  DATES   8 Dimensions of Wellness    Medical Aspects  10/16/23-10/22/23   Mental Health  10/23/23-10/29/23   Acceptance  10/30/23-11/5/23   Relationships  11/6/2023-11/12/23   Structure & Balance  11/13/23-11/19/23   Relapse Prevention  11/27/23-12/3/2023   Coping Skills       Justification for Continued Treatment at this Level of Care:  Patient is at high risk of relapse potential and would benefit from the groups copping skills and relapse prevention skills. Patient has Depression and Anxiety and would benefit from having one on one with Jennifer Holliday.    Discharge Planning:  Target Discharge Date/Timeframe:  3/12/2024   Med Mgmt Provider/Appt:  PCP   Ind therapy Provider/Appt:  Not at this time   Family therapy Provider/Appt:  N/A   Other referrals:  None    Has vulnerable adult status change? No    Service Coordination:      Supervision:  Patient is staffed with [facility] treatment providers monthly.     Attestation:   Dr. Bhakta - Medical Director - Provides oversight and supervision of care.      Are Treatment Plan goals/objectives effective? Yes  *If no, list changes to treatment plan:    Are the current goals meeting client's needs? Yes  *If no, list the changes to treatment plan.      *Client agrees with any changes to the treatment plan: Yes  *Client received copy of changes: N/A  *Client is aware of right to access a treatment plan review: N/A

## 2023-11-30 NOTE — TELEPHONE ENCOUNTER
----- Message from JAMIE Vo sent at 11/30/2023  3:52 PM CST -----  Please cancel Jerad's appointments. Patient is discharged as of today.     JAMIE Vo

## 2023-11-30 NOTE — PROGRESS NOTES
"     IOP DISCHARGE SUMMARY                                                                                                            Name:  Jerad Ross   :  JAMIE Vo   Admit Date: 10/10/23   Discharge Date: 23   :  1960   Hours Completed: 58   Initial Diagnosis:  303.90 (F10.20) Alcohol Use Disorder Severe   Final Diagnosis:  303.90 (F10.20) Alcohol Use Disorder Severe   Discharge Address:    [address] [place] [address]   Funding Source:     [facility]      Program:  [facility]     Discharge Type:  PWSA    Patient was receiving residential services at the time of discharge:   NO    Reasons for and circumstances of service termination:  Jerad is moving to [place] to be with family on 23. Jerad completed phase 1 of the co-occurring program and met with JAMIE Fan individually.      If program discharge status was At Staff Request, the license castillo must identify the following:    Other interested parties conferred with: not applicable    Referrals provided: not applicable    Alternatives considered and attempted before deciding to discharge:  not applicable    Dimension/Course of Treatment/Individualized Care:   1.  Withdrawal Potential - Intake Risk level -  0 Discharge Risk level -0  Narrative supporting risk description:  Jerad's reported last use of alcohol was on 23 and Oxycodone at the \"end of July\". No withdrawal concerns reported.   Treatment plan goals and progress towards those goals:  Jerad's goal was to maintain abstinence to avoid withdrawal concerns. Jerad did not report any return to use while enrolled.    2.  Biomedical Conditions and Complications - Intake Risk level - 1 Discharge Risk level - 1  Narrative supporting risk description:  Prior to admission Jerad had a hip surgery, he is now able to ambulate without a walker to cane. Jerad has a hx of transplant and has a nephrologist he follows " up with. Jerad has a PCP and access to care. No new or emergent concerns.   Treatment plan goals and progress towards those goals:  Jerad's goal was to manage his biomedical concerns. He has been successful in this and attended all medical appointment as scheduled.     3.  Emotional/Behavioral/Cognitive Conditions and Complications - Intake Risk level -  2 Discharge Risk level - 2  Narrative supporting risk description:  Jerad has a mental health diagnoses of anxiety and depression. Jerad has a hx of sex addiction and is currently attending support groups. Jerad is not currently working with an individual therapist, he will be provided with a list of local providers to select from if he chooses. Jerad met with JAMIE Fan for individual sessions while enrolled in group.   Treatment plan goals and progress towards those goals:  Jerad's goal was to manage his mental health. Jerad gained insight into his mental health. He worked individually with staff therapist JAMIE Fan.Jerad and this writer discussed community therapists as part of aftercare planning.    4.  Readiness for Change - Intake Risk level -  1  Discharge Risk level - 0  Narrative supporting risk description:  Jerad was an active and engaged group member. He had good attendance while enrolled. Jerad verbalized continued committment to his recovery.   Treatment plan goals and progress towards those goals:  Jerad completed phase 1 of programing. He is completing phase 1 early due to moving to [place].    5.  Relapse/Continued Use/Continued Problem Potential - Intake Risk level -  3 Discharge Risk level - 2  Narrative supporting risk description:  Jerad gained insight into his triggers and developed some relapse prevention skills. Jerad has identified coping skills including routine, sober support groups, scripture, meditation and prayer. He has also been journaling. Jerad is at low to moderate risk for relapse at this  time.   Treatment plan goals and progress towards those goals:  Jerad's goal was to gain insight on triggers and develop relapse prevention. He worked on this in the groups setting and individually with ONI Fuller, HOWARDC. He has a relapse prevention plan worksheet he is willing to fill out.    6.  Recovery Environment - Intake Risk level -  1 Discharge Risk level - 1  Narrative supporting risk description:  Jerad is attending sober support meetings and SA meetings, he plans to continue this via Zoom. He is moving to [place] to be with his sister and mother. Jerad has begun to develop a structure that promotes recovery.   Treatment plan goals and progress towards those goals:  Jerad's goal was to develop structure, that promotes recovery. He has been successful in this as he is engaged in sober support networks. He is moving to be closer to his family and this will improve his social connections and activities.    Strengths: Communication, boundaries   Needs: Sober support network, structure and routine    Services Provided: Intake, assessment, treatment planning, education, group discussion, film, lectures, 1x1 therapy, and recommendations at discharge.      Program Involvement: good  Attendance: good  Ability to relate in group/   Other program activities: good  Assignment Completion: NA  Overall Behavior: good  Reported Family/Significant   Other Involvement: NA    Prognosis: Good    Focus of Treatment / Discharge Recommendations    Personal Safety/ Management of Symptoms    * Follow your safety plan.  Report increased symptoms to your care team and /or go to the nearest Emergency Department.    * Call crisis lines as needed    [facility] [phone]                [facility]  [phone]  [facility] [phone]     [facility] [phone]                [facility] [phone]             [facility] [phone]  [facility] [phone]           National  Suicide Prevention  [address]  [facility] [phone]                  [facility] [phone]  Suicide Prevention [phone]  Throughout  [place]: call **CRISIS (**925753)  Crisis Text Line: is available 24/7 by texting [place] to 208347      Abstinence/Relapse Prevention  * Take all medicines as directed.  Carry a current list of medicines with you.  * Use coping skills  * Do not use illicit (street) drugs, controlled substances (narcotics) or alcohol.    Develop/Improve Independent Living/Socialization Skills    Community Resources/Supports and Discharge Planning  Follow up with psychiatrist / main caregiver: As needed     Follow up with your therapist: NA    Go to group therapy and / or support groups at: Community support networks     See your medical doctor about:  Any new or worsening concerns     Attestation:   Dr. Bhakta - Medical Director - Provides oversight and supervision of care.    Counselor Name and Title:  JAMIE Vo       Date:  11/30/2023  Time:  3:53 PM

## 2023-12-08 ENCOUNTER — MYC MEDICAL ADVICE (OUTPATIENT)
Dept: INTERNAL MEDICINE | Facility: CLINIC | Age: 63
End: 2023-12-08

## 2023-12-08 DIAGNOSIS — Z95.1 S/P CABG (CORONARY ARTERY BYPASS GRAFT): ICD-10-CM

## 2023-12-08 NOTE — TELEPHONE ENCOUNTER
metoprolol succinate ER (TOPROL XL) 25 MG 24 hr tablet   Last Written Prescription Date:  7/26/23  Last Fill Quantity: 15,   # refills: 0  Last Office Visit : 10/3/23  Future Office visit:  none    Routing refill request to provider for review/approval because:  Last written by ED provider 7/26/23

## 2023-12-11 RX ORDER — METOPROLOL SUCCINATE 25 MG/1
12.5 TABLET, EXTENDED RELEASE ORAL DAILY
Qty: 90 TABLET | Refills: 3 | Status: SHIPPED | OUTPATIENT
Start: 2023-12-11

## 2024-01-22 DIAGNOSIS — B18.1 CHRONIC VIRAL HEPATITIS B WITHOUT DELTA AGENT AND WITHOUT COMA (H): ICD-10-CM

## 2024-01-22 DIAGNOSIS — E78.00 PURE HYPERCHOLESTEROLEMIA: ICD-10-CM

## 2024-01-22 DIAGNOSIS — B18.1 CHRONIC HEPATITIS B (H): ICD-10-CM

## 2024-01-22 DIAGNOSIS — F33.41 RECURRENT MAJOR DEPRESSIVE DISORDER, IN PARTIAL REMISSION (H): ICD-10-CM

## 2024-01-22 RX ORDER — ENTECAVIR 0.5 MG/1
0.5 TABLET, FILM COATED ORAL DAILY
Qty: 90 TABLET | Refills: 0 | Status: SHIPPED | OUTPATIENT
Start: 2024-01-22 | End: 2024-06-25

## 2024-01-23 RX ORDER — ROSUVASTATIN CALCIUM 20 MG/1
TABLET, COATED ORAL
Qty: 90 TABLET | Refills: 0 | Status: SHIPPED | OUTPATIENT
Start: 2024-01-23 | End: 2024-06-26

## 2024-01-23 NOTE — TELEPHONE ENCOUNTER
Date of Last Office Visit: 11/17/2023  [facility]     Genia Fraser APRN CNP     Date of Next Office Visit: 0  No shows since last visit: 0  Cancellations since last visit: 0  ------------------------------  Medication requested:  hydrOXYzine (ATARAX) 10 MG tablet  Date last ordered: 10/16/2023 Qty: 30 Refills: 1         Sig - Route: Take 1 tablet (10 mg) by mouth daily as needed for anxiety - Oral  Sent to pharmacy as: hydrOXYzine HCl 10 MG Oral Tablet (ATARAX)  Class: E-Prescribe  ------------------------------     Lapse in medication adherence greater than 5 days?: n/a prn med  If yes, call patient and gather details: -  Medication refill request verified as identical to current order?: yes       Last Visit Treatment Plan     1) PSYCHOTROPIC MEDICATIONS:  continue Prozac 60mg daily   hydroxyzine HCL 10mg daily (has) and 25mg at bed PRN       2) THERAPY:  active in AA, SA, MICD IOP      3) MONITORING:  followed by PCP for INR, cardiologist, gastroenterology, nephrology, pulmonology, transplant     4) REFERRALS:  n/a     5) RTC: 3-6 months, sooner as needed   -Traveling to [place] Dec 2, may stay until April, aware of potential limits of writer prescribing out of state       Refill decision: Refill pended and routed to the provider for review/determination due to the following criteria not met: question if patient is not in the state of [place] at time of request    Medication unable to be refilled by RN due to criteria not met as indicated.                 []Eligibility - not seen in the last year              []Supervision - no future appointment              []Compliance - no shows, cancellations or lapse in therapy              []Verification - order discrepancy              []Controlled medication              []Medication not included in policy              []90-day supply request              [x]Other: question if patient is not in the state of [place] at time of request

## 2024-01-24 NOTE — TELEPHONE ENCOUNTER
Attempted to call patient.  No answer. Left voicemail to call back clinic at [phone]. Sent Malhar message also.

## 2024-01-29 ENCOUNTER — TELEPHONE (OUTPATIENT)
Dept: TRANSPLANT | Facility: CLINIC | Age: 64
End: 2024-01-29
Payer: COMMERCIAL

## 2024-01-29 DIAGNOSIS — Z94.0 KIDNEY TRANSPLANTED: Primary | ICD-10-CM

## 2024-01-29 DIAGNOSIS — Z48.298 AFTERCARE FOLLOWING ORGAN TRANSPLANT: ICD-10-CM

## 2024-01-29 NOTE — TELEPHONE ENCOUNTER
[facility] Call Center    Phone Message    Reason for Call: Other: Patient called to reschedule his appointment with Dr. Lovett from 2/1, he states he is out of [place] and cannot make it. No openings currently, did let patient know we would reach out when templates were open, but writer wanted to let RNCC know in case it is not okay to wait that long for the patient.      Action Taken: Message routed to:  [facility] ([facility]): CHANELLE Delgado

## 2024-02-05 ENCOUNTER — MYC REFILL (OUTPATIENT)
Dept: INTERNAL MEDICINE | Facility: CLINIC | Age: 64
End: 2024-02-05
Payer: COMMERCIAL

## 2024-02-05 ENCOUNTER — MYC REFILL (OUTPATIENT)
Dept: PSYCHIATRY | Facility: CLINIC | Age: 64
End: 2024-02-05
Payer: COMMERCIAL

## 2024-02-05 DIAGNOSIS — F41.1 GENERALIZED ANXIETY DISORDER: Chronic | ICD-10-CM

## 2024-02-05 DIAGNOSIS — F33.41 RECURRENT MAJOR DEPRESSIVE DISORDER, IN PARTIAL REMISSION (H): ICD-10-CM

## 2024-02-05 DIAGNOSIS — G47.00 INSOMNIA, UNSPECIFIED TYPE: ICD-10-CM

## 2024-02-05 DIAGNOSIS — Z95.1 S/P CABG (CORONARY ARTERY BYPASS GRAFT): Primary | ICD-10-CM

## 2024-02-06 NOTE — TELEPHONE ENCOUNTER
Writer also called the number below and got hold of [facility] in [place] , located in [place] .      [id number], [place], [place]    [address] [place] [address]    Will wait to confirm with patient .        Jodee Garay on 2/6/2024 at 3:22 PM

## 2024-02-06 NOTE — TELEPHONE ENCOUNTER
isosorbide mononitrate (IMDUR) 60 MG 24 hr tablet       Last Written Prescription Date:  reported/ historical  Last Fill Quantity: ~,   # refills: ~  Last Office Visit : 10/3/23  Future Office visit:  None    Routing refill request to provider for review/approval because:  Medication is reported/historical

## 2024-02-07 RX ORDER — HYDROXYZINE HYDROCHLORIDE 10 MG/1
10 TABLET, FILM COATED ORAL DAILY PRN
Qty: 30 TABLET | Refills: 1 | Status: SHIPPED | OUTPATIENT
Start: 2024-02-07

## 2024-02-07 RX ORDER — HYDROXYZINE HYDROCHLORIDE 25 MG/1
25 TABLET, FILM COATED ORAL
Qty: 90 TABLET | Refills: 1 | Status: SHIPPED | OUTPATIENT
Start: 2024-02-07

## 2024-02-15 RX ORDER — ISOSORBIDE MONONITRATE 60 MG/1
60 TABLET, EXTENDED RELEASE ORAL DAILY
Qty: 90 TABLET | Refills: 4 | Status: SHIPPED | OUTPATIENT
Start: 2024-02-15

## 2024-03-20 ENCOUNTER — TELEPHONE (OUTPATIENT)
Dept: OPHTHALMOLOGY | Facility: CLINIC | Age: 64
End: 2024-03-20
Payer: COMMERCIAL

## 2024-03-20 DIAGNOSIS — K21.9 GASTROESOPHAGEAL REFLUX DISEASE WITHOUT ESOPHAGITIS: ICD-10-CM

## 2024-03-20 NOTE — TELEPHONE ENCOUNTER
LVM for patient of scheduling error. Rescheduled patient correctly and provided new appointment details on voice message. Also, sent patient a reminder letter and a MyChart message.

## 2024-03-26 ENCOUNTER — TELEPHONE (OUTPATIENT)
Dept: OPHTHALMOLOGY | Facility: CLINIC | Age: 64
End: 2024-03-26
Payer: COMMERCIAL

## 2024-03-26 RX ORDER — PANTOPRAZOLE SODIUM 40 MG/1
40 TABLET, DELAYED RELEASE ORAL DAILY
Qty: 90 TABLET | Refills: 1 | Status: SHIPPED | OUTPATIENT
Start: 2024-03-26

## 2024-03-26 NOTE — TELEPHONE ENCOUNTER
LVM for patient again of scheduling error. Rescheduled patient correctly and provided new appointment details on voice message. Also, sent patient a reminder letter and a MyChart message.

## 2024-04-12 ENCOUNTER — TELEPHONE (OUTPATIENT)
Dept: OPHTHALMOLOGY | Facility: CLINIC | Age: 64
End: 2024-04-12
Payer: COMMERCIAL

## 2024-04-12 ASSESSMENT — ANXIETY QUESTIONNAIRES
4. TROUBLE RELAXING: MORE THAN HALF THE DAYS
6. BECOMING EASILY ANNOYED OR IRRITABLE: SEVERAL DAYS
GAD7 TOTAL SCORE: 10
7. FEELING AFRAID AS IF SOMETHING AWFUL MIGHT HAPPEN: SEVERAL DAYS
GAD7 TOTAL SCORE: 10
IF YOU CHECKED OFF ANY PROBLEMS ON THIS QUESTIONNAIRE, HOW DIFFICULT HAVE THESE PROBLEMS MADE IT FOR YOU TO DO YOUR WORK, TAKE CARE OF THINGS AT HOME, OR GET ALONG WITH OTHER PEOPLE: SOMEWHAT DIFFICULT
5. BEING SO RESTLESS THAT IT IS HARD TO SIT STILL: MORE THAN HALF THE DAYS
GAD7 TOTAL SCORE: 10
7. FEELING AFRAID AS IF SOMETHING AWFUL MIGHT HAPPEN: SEVERAL DAYS
3. WORRYING TOO MUCH ABOUT DIFFERENT THINGS: SEVERAL DAYS
8. IF YOU CHECKED OFF ANY PROBLEMS, HOW DIFFICULT HAVE THESE MADE IT FOR YOU TO DO YOUR WORK, TAKE CARE OF THINGS AT HOME, OR GET ALONG WITH OTHER PEOPLE?: SOMEWHAT DIFFICULT
2. NOT BEING ABLE TO STOP OR CONTROL WORRYING: SEVERAL DAYS
1. FEELING NERVOUS, ANXIOUS, OR ON EDGE: MORE THAN HALF THE DAYS

## 2024-04-15 ENCOUNTER — VIRTUAL VISIT (OUTPATIENT)
Dept: ADDICTION MEDICINE | Facility: CLINIC | Age: 64
End: 2024-04-15
Payer: COMMERCIAL

## 2024-04-15 DIAGNOSIS — F10.20 ALCOHOL USE DISORDER, SEVERE, DEPENDENCE (H): Primary | ICD-10-CM

## 2024-04-15 DIAGNOSIS — F41.1 GENERALIZED ANXIETY DISORDER: ICD-10-CM

## 2024-04-15 PROCEDURE — 99214 OFFICE O/P EST MOD 30 MIN: CPT | Mod: 95 | Performed by: NURSE PRACTITIONER

## 2024-04-15 PROCEDURE — G2211 COMPLEX E/M VISIT ADD ON: HCPCS | Mod: 95 | Performed by: NURSE PRACTITIONER

## 2024-04-15 RX ORDER — NALTREXONE HYDROCHLORIDE 50 MG/1
50 TABLET, FILM COATED ORAL DAILY
Qty: 90 TABLET | Refills: 1 | Status: SHIPPED | OUTPATIENT
Start: 2024-04-15 | End: 2024-10-07

## 2024-04-15 ASSESSMENT — PATIENT HEALTH QUESTIONNAIRE - PHQ9
10. IF YOU CHECKED OFF ANY PROBLEMS, HOW DIFFICULT HAVE THESE PROBLEMS MADE IT FOR YOU TO DO YOUR WORK, TAKE CARE OF THINGS AT HOME, OR GET ALONG WITH OTHER PEOPLE: SOMEWHAT DIFFICULT
SUM OF ALL RESPONSES TO PHQ QUESTIONS 1-9: 7
SUM OF ALL RESPONSES TO PHQ QUESTIONS 1-9: 7

## 2024-04-15 NOTE — PROGRESS NOTES
Virtual Visit Details    Type of service:  Video Visit   Video Start Time:  1118  Video End Time:11:19 AM    Originating Location (pt. Location): Home    Distant Location (provider location):  On-site  Platform used for Video Visit: [facility]    A/P                                                    ASSESSMENT/PLAN  1. Alcohol use disorder, severe, dependence (H)  Controlled. Denies cravings or return to use, last drink remains 8/5/2023. Continues to take naltrexone 50 mg daily and attending AA meeting regularly.   Continue naltrexone without changes  Continue recovery supports,   - naltrexone (DEPADE/REVIA) 50 MG tablet; Take 1 tablet (50 mg) by mouth daily  Dispense: 90 tablet; Refill: 1    2. Generalized anxiety disorder  Reporting ongoing symptoms. Is taking prozac and hydroxyzine prescribed by psychiatry. Encouraged scheduling follow up appointment.   Continue non-pharm interventions.     Orders Placed This Encounter   Medications    naltrexone (DEPADE/REVIA) 50 MG tablet     Sig: Take 1 tablet (50 mg) by mouth daily     Dispense:  90 tablet     Refill:  1       Problem list updated Apr 15, 2024   No problems updated.          PDMP Review         Value Time User    State PDMP site checked  Yes 4/15/2024 10:57 AM Sarahy Mullins CNP              RTC  Return in about 3 months (around 7/15/2024) for Follow up, with me, using a video visit 1100 am .      Counseled the patient on the importance of having a recovery program in addition to medication to manage recovery.  Components include avoiding isolating, having willingness to change, avoiding triggers and managing cravings. Encouraged having some type of sober network and practicing honesty with trusted support person(s). Encouraged other services such as counseling, 12 step or other self-help organizations.              SUBJECTIVE                                                    Jeradale Ross is a 63 year old male  "with history of HTN, JULIANE, , CAD s/p CABG x3 9/2020, depression with anxiety, and alcohol use who presents for follow up.      Brief history: Initial visit 8/14/23. Jerad  is requesting a chemical health assessment to see if he \"should go to treatment of if AA is enough\". He reports that he began drinking 8 shots of whiskey 1-3 times weekly for past 4 months. States his depression and anxiety as well as opiate withdrawal triggered use escalating use. Has been prescribed opioid pain medication on/off since 3/2023 due to right hip pain 2/2 avascular necrosis which was revised 6/2023 and admits to taking more than prescribed on occasion and states that he realized he was taking them sometimes due to his emotions versus physical pain. He has not taken an opioid pain medication for past 3 weeks. States if he ever requires opioid pain medication in future, prefers to be tapered off.      He had a period of 4 years 9873-5947 of total abstinence from alcohol States he went to treatment for \"sexual compulsivity\" followed by a \"sober house\" and he just maintained abstinence after leaving sober house. States he went to sober house because he needed somewhere to go after leaving the [facility]. He is attending a 12 step group for his sexual compulsions. Has recently started attending AA as well.      Remote history of renal failure as an adolescent. Was on dialysis at age 16, and s/p kidney transplant at age 18. States he contracted hepatitis B from dialysis,      Substance Use History:   \"Have you ever had any history with [...] use?\" And \"When was your last use?  ALCOHOL - Drinks 8 shots of whiskey 1-3 times weekly for past 4 months, last drink 8/5/23.   CANNABIS - Denies  PRESCRIPTION STIMULANTS (includes Ritalin, Adderall, Vyvanse) - Denies  COCAINE/CRACK - Denies  METH/AMPHETAMINES (includes ecstacy, MDMA/connie) - Denies  OPIATES - Prescribed opiate pain medication on/off since 3/2023 due to right hip pain with revision " "6/19/2023. States he took as prescribed mostly, but does admit to taking more than  prescribed on a few occasions. He needs to be tapered off of opioids in future as he did experience opiate w/d and began drinking to help with symptoms. No longer has prescription for opioids.   BENZODIAZEPINES (includes Ativan, Klonopin, Xanax) - Denies  KRATOM (mild opioid and stimulant effects) - Denies  KETAMINE - Denies  HALLUCINOGENS (includes DXM) - Denies  BEHAVIORAL (Gambling, Eating d/o, Compulsivity) - Sexual compulsivirty  History of treatment - Yes the [facility], \"sober house\", and 12 step  NICOTINE  Cigarettes: Denies           Previous withdrawal treatment episodes (e.g. detox): Denies  Previous [facility] treatment programs: Denies  Hospitalizations or overdose: Denies  Medical complications from substance use: Hx of  renal failure on dialysis at age 16, and kidney transplant, infected with hepatitis B from dialysis. Alcohol impacts his liver.   IV Drug use?: Denies  Previous Medication for Addiction Tx: Denies  Longest period of full abstinence: Almost 4 years 2945-7403  Activities that have previously supported abstinence: Some AA  Current Recovery Activities: AA       Recent HPI Details:11/20/2023  -Started IOP at , just about finished with phase I.   -Plans to go to [place] 12/2/23 to visit his mom and sister for the holidays will not be back until sometime in April.   Taking naltrexone 50 mg, denies cravings. Does have occasional thoughts.   Going to AA meetings, mostly online right now.   Denies return to use , last drink 8/5/23.   Physically feeling better since stopping alcohol. Is getting stronger and building up his endurance.   Plans to spend time with sober friend for upcoming holiday.            1. Alcohol use disorder, severe, dependence (H)  Controlled with naltrexone 50 mg daily, last drink was 8/5/2023. Is currently in  IOP and will be able to complete phase I before going to [place] for the " "winter.   Encouraged meeting attendance and resources provided for mobile meeting kiel.   Continue naltrexone, providing 90 day supply with a refill for his travels.   - naltrexone (DEPADE/REVIA) 50 MG tablet; Take 1 tablet (50 mg) by mouth daily  Dispense: 90 tablet; Refill: 1     2. Generalized anxiety disorder  3. Insomnia, unspecified type  Is taking hydroxyzine and prozac prescribed by psychiatry.     TODAY'S VISIT  HPI Apr 15, 2024    Just returned home his trip to [place]. Enjoyed spending time with his sister and mom. Sister has some horses and enjoyed that as well.   Denies return to alcohol use. There was some alcohol in the home and he did have an occasional thought \"it would be nice\", did not have any close calls.   Still taking naltrexone 50 mg daily.   Was attending his meetings online, did not have access to a car.   Health has been \"pretty stable\"   Started a You tube channel about EDC (everyday carry) for people with chronic illness/disability, carrying about oneself so that has been really interesting.     Still experiencing increased  anxiety will schedule appointment with psychiatry. Taking Prozac and hydroxyzine. Is still using lavender oil and trying some other things to help.           9/28/2023     2:37 PM 10/26/2023     3:00 PM 4/15/2024    10:47 AM   PHQ   PHQ-9 Total Score 4 4 7   Q9: Thoughts of better off dead/self-harm past 2 weeks Not at all Not at all Not at all       OBJECTIVE                                                    PHYSICAL EXAM:  There were no vitals taken for this visit.      Physical Exam  Pulmonary:      Effort: Pulmonary effort is normal. No respiratory distress.   Neurological:      General: No focal deficit present.      Mental Status: He is alert and oriented to person, place, and time.   Psychiatric:         Attention and Perception: Attention normal.         Mood and Affect: Mood normal.         Speech: Speech normal.         Behavior: Behavior is cooperative.         " Thought Content: Thought content normal. Thought content does not include suicidal ideation.         Judgment: Judgment normal.         LAB      HISTORY                                                    Problem list reviewed & adjusted, as indicated.  Patient Active Problem List   Diagnosis    BCC (basal cell carcinoma), trunk    JULIANE (obstructive sleep apnea)    HTN, kidney transplant related    Coronary arteriosclerosis due to lipid rich plaque    NSTEMI (non-ST elevated myocardial infarction) (H)    Depression    Diverticulosis    Hemorrhoids    Kidney replaced by transplant    Basal cell carcinoma    Squamous cell carcinoma    Dyslipidemia    CRP elevated    Glaucoma    AION (acute ischemic optic neuropathy)    Paracentral scotoma    Hip pain    Inflamed seborrheic keratosis    Intertrigo    Chronic hepatitis B (H)    Immunosuppression (H24)    Hypogonadism in male    GERD (gastroesophageal reflux disease)    Aftercare following organ transplant    Acute midline low back pain without sciatica    Septic bursitis    Impaired mobility    CAD -- S/P CABG x 3 in 2020    Abnormal cardiovascular stress test    HTN (hypertension)    End stage renal disease (H)    Hip pain, right    Avascular necrosis of bones of both hips (H)    Chest pain, Probably chest wall in origin    Preoperative examination    Coronary artery disease of native artery of native heart with stable angina pectoris (H24)    Failed total hip arthroplasty, sequela    Generalized muscle weakness    Generalized anxiety disorder    Transient hypotension    Hypokalemia    Alcohol use disorder, severe, dependence (H)    Hypoxia    Multiple subsegmental pulmonary emboli without acute cor pulmonale (H)    Hypomagnesemia    General weakness    Unable to ambulate    Nonadherence to medication         MEDICATION LIST (prior to visit)  Current Outpatient Medications   Medication Sig Dispense Refill    naltrexone (DEPADE/REVIA) 50 MG tablet Take 1 tablet (50 mg) by  mouth daily 90 tablet 1    acetaminophen (TYLENOL) 500 MG tablet Take 1,000 mg by mouth 3 times daily as needed for mild pain      albuterol (PROAIR HFA) 108 (90 Base) MCG/ACT inhaler Inhale 2 puffs into the lungs every 6 hours as needed for shortness of breath / dyspnea or wheezing 1 g 11    aspirin 81 MG EC tablet Take 1 tablet (81 mg) by mouth daily      budesonide (PULMICORT FLEXHALER) 90 MCG/ACT inhaler Inhale 2 puffs into the lungs 2 times daily as needed (shortness of breath)      entecavir (BARACLUDE) 0.5 MG tablet TAKE 1 TABLET(0.5 MG) BY MOUTH DAILY 90 tablet 0    FLUoxetine (PROZAC) 20 MG capsule Take 1 capsule (20 mg) by mouth daily With 40mg for a total daily dose of 60mg 90 capsule 1    FLUoxetine (PROZAC) 40 MG capsule Take 1 capsule (40 mg) by mouth daily 90 capsule 1    fluticasone-salmeterol (ADVAIR) 250-50 MCG/ACT inhaler Inhale 1 puff into the lungs 2 times daily 180 each 3    furosemide (LASIX) 20 MG tablet Take 1 tablet (20 mg) by mouth daily as needed (fluid retention) 90 tablet 1    hydrOXYzine HCl (ATARAX) 10 MG tablet Take 1 tablet (10 mg) by mouth daily as needed for anxiety 30 tablet 1    hydrOXYzine HCl (ATARAX) 25 MG tablet Take 1 tablet (25 mg) by mouth nightly as needed for anxiety (sleep) 90 tablet 1    isosorbide mononitrate (IMDUR) 60 MG 24 hr tablet Take 1 tablet (60 mg) by mouth daily 90 tablet 4    latanoprost (XALATAN) 0.005 % ophthalmic solution Place 1 drop into both eyes At Bedtime 2.5 mL 11    metoprolol succinate ER (TOPROL XL) 25 MG 24 hr tablet Take 0.5 tablets (12.5 mg) by mouth daily 90 tablet 3    Multiple Vitamins-Iron (ONE DAILY MULTIVITAMIN/IRON) TABS Take 1 tablet by mouth daily      mycophenolate (GENERIC EQUIVALENT) 250 MG capsule Take 3 capsules (750 mg) by mouth 2 times daily 540 capsule 3    nitroGLYcerin (NITROSTAT) 0.4 MG sublingual tablet Place 1 tablet (0.4 mg) under the tongue every 5 minutes as needed for chest pain 20 tablet 3    Omega-3 Fatty Acids  (OMEGA 3 PO) Take 1 capsule by mouth daily Dose unknown      pantoprazole (PROTONIX) 40 MG EC tablet Take 1 tablet (40 mg) by mouth daily 90 tablet 1    polyethylene glycol (MIRALAX) 17 g packet Take 17 g by mouth daily as needed       predniSONE (DELTASONE) 5 MG tablet Take 1 tablet (5 mg) by mouth daily 90 tablet 3    Rivaroxaban ANTICOAGULANT 15 & 20 MG TBPK Starter Therapy Pack Take 15 mg by mouth 2 times daily (with meals) for 21 days, THEN 20 mg daily with food for 9 days. 51 each 0    rosuvastatin (CRESTOR) 20 MG tablet TAKE 1 TABLET(20 MG) BY MOUTH DAILY 90 tablet 0    tacrolimus (PROTOPIC) 0.1 % external ointment Apply topically 2 times daily as needed       No current facility-administered medications for this visit.       MEDICATION LIST (after visit)  Current Outpatient Medications   Medication Sig Dispense Refill    naltrexone (DEPADE/REVIA) 50 MG tablet Take 1 tablet (50 mg) by mouth daily 90 tablet 1    acetaminophen (TYLENOL) 500 MG tablet Take 1,000 mg by mouth 3 times daily as needed for mild pain      albuterol (PROAIR HFA) 108 (90 Base) MCG/ACT inhaler Inhale 2 puffs into the lungs every 6 hours as needed for shortness of breath / dyspnea or wheezing 1 g 11    aspirin 81 MG EC tablet Take 1 tablet (81 mg) by mouth daily      budesonide (PULMICORT FLEXHALER) 90 MCG/ACT inhaler Inhale 2 puffs into the lungs 2 times daily as needed (shortness of breath)      entecavir (BARACLUDE) 0.5 MG tablet TAKE 1 TABLET(0.5 MG) BY MOUTH DAILY 90 tablet 0    FLUoxetine (PROZAC) 20 MG capsule Take 1 capsule (20 mg) by mouth daily With 40mg for a total daily dose of 60mg 90 capsule 1    FLUoxetine (PROZAC) 40 MG capsule Take 1 capsule (40 mg) by mouth daily 90 capsule 1    fluticasone-salmeterol (ADVAIR) 250-50 MCG/ACT inhaler Inhale 1 puff into the lungs 2 times daily 180 each 3    furosemide (LASIX) 20 MG tablet Take 1 tablet (20 mg) by mouth daily as needed (fluid retention) 90 tablet 1    hydrOXYzine HCl  (ATARAX) 10 MG tablet Take 1 tablet (10 mg) by mouth daily as needed for anxiety 30 tablet 1    hydrOXYzine HCl (ATARAX) 25 MG tablet Take 1 tablet (25 mg) by mouth nightly as needed for anxiety (sleep) 90 tablet 1    isosorbide mononitrate (IMDUR) 60 MG 24 hr tablet Take 1 tablet (60 mg) by mouth daily 90 tablet 4    latanoprost (XALATAN) 0.005 % ophthalmic solution Place 1 drop into both eyes At Bedtime 2.5 mL 11    metoprolol succinate ER (TOPROL XL) 25 MG 24 hr tablet Take 0.5 tablets (12.5 mg) by mouth daily 90 tablet 3    Multiple Vitamins-Iron (ONE DAILY MULTIVITAMIN/IRON) TABS Take 1 tablet by mouth daily      mycophenolate (GENERIC EQUIVALENT) 250 MG capsule Take 3 capsules (750 mg) by mouth 2 times daily 540 capsule 3    nitroGLYcerin (NITROSTAT) 0.4 MG sublingual tablet Place 1 tablet (0.4 mg) under the tongue every 5 minutes as needed for chest pain 20 tablet 3    Omega-3 Fatty Acids (OMEGA 3 PO) Take 1 capsule by mouth daily Dose unknown      pantoprazole (PROTONIX) 40 MG EC tablet Take 1 tablet (40 mg) by mouth daily 90 tablet 1    polyethylene glycol (MIRALAX) 17 g packet Take 17 g by mouth daily as needed       predniSONE (DELTASONE) 5 MG tablet Take 1 tablet (5 mg) by mouth daily 90 tablet 3    Rivaroxaban ANTICOAGULANT 15 & 20 MG TBPK Starter Therapy Pack Take 15 mg by mouth 2 times daily (with meals) for 21 days, THEN 20 mg daily with food for 9 days. 51 each 0    rosuvastatin (CRESTOR) 20 MG tablet TAKE 1 TABLET(20 MG) BY MOUTH DAILY 90 tablet 0    tacrolimus (PROTOPIC) 0.1 % external ointment Apply topically 2 times daily as needed       No current facility-administered medications for this visit.         Allergies   Allergen Reactions    Penicillins Shortness Of Breath and Hives    Keflex [Cephalexin Hcl] Unknown     Patient could not recall reaction    Sulfa Antibiotics Rash    Tetracycline Unknown     Patient could not recall reaction        Continued Complex Management  The longitudinal  plan of care for Alcohol Use Disorder (AUD) was addressed during this visit. Due to the added complexity in care, I will continue to support Jerad in the subsequent management and with ongoing continuity of care.          aSrahy Mullins DNP,MSN, AGNP-C  [facility]   [address], [address]   [place] [address]   Phone # [phone]

## 2024-04-15 NOTE — NURSING NOTE
Is the patient currently in the state of [place]? YES    Visit mode:VIDEO    If the visit is dropped, the patient can be reconnected by: TELEPHONE VISIT: Phone number: [phone]    Will anyone else be joining the visit? No  (If patient encounters technical issues they should call [phone])    How would you like to obtain your AVS? MyChart    Are changes needed to the allergy or medication list? No    Rooming Documentation: Assigned questionnaire(s) completed .    Reason for visit: No chief complaint on file.     JEREMY Blanc

## 2024-04-17 ENCOUNTER — OFFICE VISIT (OUTPATIENT)
Dept: OPHTHALMOLOGY | Facility: CLINIC | Age: 64
End: 2024-04-17
Payer: COMMERCIAL

## 2024-04-17 DIAGNOSIS — Z86.69 HISTORY OF ANTERIOR ISCHEMIC OPTIC NEUROPATHY: Primary | ICD-10-CM

## 2024-04-17 DIAGNOSIS — Z96.1 PSEUDOPHAKIA OF BOTH EYES: ICD-10-CM

## 2024-04-17 DIAGNOSIS — H52.4 PRESBYOPIA: ICD-10-CM

## 2024-04-17 DIAGNOSIS — H40.053 BORDERLINE GLAUCOMA WITH OCULAR HYPERTENSION, BILATERAL: ICD-10-CM

## 2024-04-17 PROCEDURE — 92015 DETERMINE REFRACTIVE STATE: CPT | Performed by: OPTOMETRIST

## 2024-04-17 PROCEDURE — 92014 COMPRE OPH EXAM EST PT 1/>: CPT | Performed by: OPTOMETRIST

## 2024-04-17 ASSESSMENT — REFRACTION_WEARINGRX
OS_AXIS: 168
OS_ADD: +2.50
OS_CYLINDER: +2.25
OS_SPHERE: -0.50
SPECS_TYPE: TRIFOCAL
OD_SPHERE: BALANCE

## 2024-04-17 ASSESSMENT — REFRACTION_MANIFEST
OS_CYLINDER: +2.75
OD_SPHERE: BALANCE
OS_AXIS: 168
OS_SPHERE: -1.00
OS_ADD: +2.50

## 2024-04-17 ASSESSMENT — TONOMETRY
IOP_METHOD: ICARE
OS_IOP_MMHG: 21
OD_IOP_MMHG: 26
OD_IOP_MMHG: 27
OS_IOP_MMHG: 22

## 2024-04-17 ASSESSMENT — VISUAL ACUITY
OD_CC+: -1
OS_CC: 20/40
METHOD: SNELLEN - LINEAR
OS_CC+: +2
OD_CC: 20/125
CORRECTION_TYPE: GLASSES

## 2024-04-17 ASSESSMENT — CUP TO DISC RATIO
OD_RATIO: 0.5
OS_RATIO: 0.65

## 2024-04-17 ASSESSMENT — CONF VISUAL FIELD
OD_INFERIOR_TEMPORAL_RESTRICTION: 2
OS_SUPERIOR_TEMPORAL_RESTRICTION: 0
METHOD: COUNTING FINGERS
OD_INFERIOR_NASAL_RESTRICTION: 2
OS_INFERIOR_NASAL_RESTRICTION: 0
OS_SUPERIOR_NASAL_RESTRICTION: 0
OS_INFERIOR_TEMPORAL_RESTRICTION: 0
OD_SUPERIOR_NASAL_RESTRICTION: 2
OD_SUPERIOR_TEMPORAL_RESTRICTION: 2
OS_NORMAL: 1

## 2024-04-17 ASSESSMENT — EXTERNAL EXAM - RIGHT EYE: OD_EXAM: NORMAL

## 2024-04-17 ASSESSMENT — PACHYMETRY
OD_CT(UM): 624
OS_CT(UM): 622

## 2024-04-17 ASSESSMENT — EXTERNAL EXAM - LEFT EYE: OS_EXAM: NORMAL

## 2024-04-17 ASSESSMENT — SLIT LAMP EXAM - LIDS
COMMENTS: NORMAL
COMMENTS: NORMAL

## 2024-04-17 NOTE — PROGRESS NOTES
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 Longstanding since at least 2016  Plan: Discussed findings with patient. Reassured patient that exam findings today are stable. Patient expressed interest in following up with OT to see what may be available to help with driving. Advised patient that colored filters (yellow or leonarda) may be beneficial to try.    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 Rizzo and Angela in the past. Pressures a little elevated today at 27/22 but he has not used latanoprost in quite some time.   Plan: Resume latanoprost nightly in both eyes. Recommend resuming care with Angela as before.     (Z96.1) Pseudophakia of both eyes  Plan: Monitor     (H52.4) Presbyopia  Plan: REFRACTION [[id number]]        Discussed findings with patient. New spectacle prescription dispensed to patient. Patient is welcome to return to clinic with prolonged adaptation difficulties. Minimal changes to Rx, but some improvement in acuity of left eye was noted today.     Complete documentation of historical and exam elements from today's encounter can  be found in the full encounter summary report (not reduplicated in this progress  note). I personally obtained the chief complaint(s) and history of present illness. I  confirmed and edited as necessary the review of systems, past medical/surgical  history, family history, social history, and examination findings as documented by  others; and I examined the patient myself. I personally reviewed the relevant tests,  images, and reports as documented above. I formulated and edited as necessary the  assessment and plan and discussed the findings and management plan with the  patient and family.    Kenneth Bella OD

## 2024-04-21 RX ORDER — HYDROXYZINE HYDROCHLORIDE 10 MG/1
10 TABLET, FILM COATED ORAL DAILY PRN
Qty: 30 TABLET | Refills: 0 | OUTPATIENT
Start: 2024-04-21

## 2024-05-09 ENCOUNTER — OFFICE VISIT (OUTPATIENT)
Dept: DERMATOLOGY | Facility: CLINIC | Age: 64
End: 2024-05-09
Payer: COMMERCIAL

## 2024-05-09 DIAGNOSIS — L81.4 SOLAR LENTIGO: ICD-10-CM

## 2024-05-09 DIAGNOSIS — Z92.25 HISTORY OF IMMUNOSUPPRESSION: ICD-10-CM

## 2024-05-09 DIAGNOSIS — L82.1 SEBORRHEIC KERATOSIS: ICD-10-CM

## 2024-05-09 DIAGNOSIS — D18.01 CHERRY ANGIOMA: ICD-10-CM

## 2024-05-09 DIAGNOSIS — L57.0 ACTINIC KERATOSIS: ICD-10-CM

## 2024-05-09 DIAGNOSIS — D22.9 MULTIPLE BENIGN NEVI: ICD-10-CM

## 2024-05-09 DIAGNOSIS — D49.2 NEOPLASM OF UNSPECIFIED BEHAVIOR OF BONE, SOFT TISSUE, AND SKIN: Primary | ICD-10-CM

## 2024-05-09 DIAGNOSIS — Z85.828 HISTORY OF NONMELANOMA SKIN CANCER: ICD-10-CM

## 2024-05-09 DIAGNOSIS — Z94.0 HISTORY OF KIDNEY TRANSPLANT: ICD-10-CM

## 2024-05-09 PROCEDURE — 88305 TISSUE EXAM BY PATHOLOGIST: CPT | Mod: 26 | Performed by: PATHOLOGY

## 2024-05-09 PROCEDURE — 11102 TANGNTL BX SKIN SINGLE LES: CPT | Mod: GC | Performed by: DERMATOLOGY

## 2024-05-09 PROCEDURE — 17000 DESTRUCT PREMALG LESION: CPT | Mod: XS | Performed by: DERMATOLOGY

## 2024-05-09 PROCEDURE — 88305 TISSUE EXAM BY PATHOLOGIST: CPT | Mod: TC | Performed by: DERMATOLOGY

## 2024-05-09 PROCEDURE — 17003 DESTRUCT PREMALG LES 2-14: CPT | Mod: GC | Performed by: DERMATOLOGY

## 2024-05-09 PROCEDURE — 11103 TANGNTL BX SKIN EA SEP/ADDL: CPT | Mod: GC | Performed by: DERMATOLOGY

## 2024-05-09 PROCEDURE — 99213 OFFICE O/P EST LOW 20 MIN: CPT | Mod: 25 | Performed by: DERMATOLOGY

## 2024-05-09 ASSESSMENT — PAIN SCALES - GENERAL: PAINLEVEL: NO PAIN (0)

## 2024-05-09 NOTE — PATIENT INSTRUCTIONS
Wound Care After a Biopsy    What is a skin biopsy?  A skin biopsy allows the doctor to examine a very small piece of tissue under the microscope to determine the diagnosis and the best treatment for the skin condition. A local anesthetic (numbing medicine) is injected with a very small needle into the skin area to be tested. A small piece of skin is taken from the area. Sometimes a suture (stitch) is used.     What are the risks of a skin biopsy?  I will experience scar, bleeding, swelling, pain, crusting and redness. I may experience incomplete removal or recurrence. Risks of this procedure are excessive bleeding, bruising, infection, nerve damage, numbness, thick (hypertrophic or keloidal) scar and non-diagnostic biopsy.    How should I care for my wound for the first 24 hours?  Keep the wound dry and covered for 24 hours  If it bleeds, hold direct pressure on the area for 15 minutes. If bleeding does not stop, call us or go to the emergency room  Avoid strenuous exercise the first 1-2 days or as your doctor instructs you    How should I care for the wound after 24 hours?  After 24 hours, remove the bandage  You may bathe or shower as normal  If you had a scalp biopsy, you can shampoo as usual and can use shower water to clean the biopsy site daily  Clean the wound once a day with gentle soap and water  Do not scrub, be gentle  Apply white petroleum/Vaseline after cleaning the wound with a cotton swab or a clean finger, and keep the site covered with a Bandaid /bandage. Bandages are not necessary with a scalp biopsy  If you are unable to cover the site with a Bandaid /bandage, re-apply ointment 2-3 times a day to keep the site moist. Moisture will help with healing  Avoid strenuous activity for first 1-2 days  Avoid lakes, rivers, pools, and oceans until the stitches are removed or the site is healed    How do I clean my wound?  Wash hands thoroughly with soap or use hand  before all wound care  Clean  the wound with gentle soap and water  Apply white petroleum/Vaseline  to wound after it is clean  Replace the Bandaid /bandage to keep the wound covered for the first few days or as instructed by your doctor  If you had a scalp biopsy, warm shower water to the area on a daily basis should suffice    What should I use to clean my wound?   Cotton-tipped applicators (Qtips )  White petroleum jelly (Vaseline ). Use a clean new container and use Q-tips to apply.  Bandaids  as needed  Gentle soap     How should I care for my wound long term?  Do not get your wound dirty  Keep up with wound care for one week or until the area is healed.  A small scab will form and fall off by itself when the area is completely healed. The area will be red and will become pink in color as it heals. Sun protection is very important for how your scar will turn out. Sunscreen with an SPF 30 or greater is recommended once the area is healed.  You should have some soreness but it should be mild and slowly go away over several days. Talk to your doctor about using tylenol for pain,    When should I call my doctor?  If you have increased:   Pain or swelling  Pus or drainage (clear or slightly yellow drainage is ok)  Temperature over 100F  Spreading redness or warmth around wound    When will I hear about my results?  The biopsy results can take 2 weeks to come back.  Your results will automatically release to [facility] before your provider has even reviewed them.  The clinic will call you with the results, send you a [facility] message, or have you schedule a follow-up clinic or phone time to discuss the results.  Contact our clinics if you do not hear from us in 2 weeks.    Who should I call with questions?  [facility]: [phone]  [facility]: [phone]  For urgent needs outside of business hours call the [facility] at [phone] and ask for the dermatology resident on call        Cryotherapy    What is it?  Use of a very cold liquid, such as liquid nitrogen, to freeze and destroy abnormal skin cells that need to be removed    What should I expect?  Tenderness and redness  A small blister that might grow and fill with dark purple blood. There may be crusting.  More than one treatment may be needed if the lesions do not go away.    How do I care for the treated area?  Gently wash the area with your hands when bathing.  Use a thin layer of Vaseline to help with healing. You may use a Band-Aid.   The area should heal within 7-10 days and may leave behind a pink or lighter color.   Do not use an antibiotic or Neosporin ointment.   You may take acetaminophen (Tylenol) for pain.     Call your doctor if you have:  Severe pain  Signs of infection (warmth, redness, cloudy yellow drainage, and or a bad smell)  Questions or concerns    Who should I call with questions?      [facility]: [phone]      [facility]: [phone]      For urgent needs outside of business hours call the [facility] at [phone] and ask for the dermatology resident on call

## 2024-05-09 NOTE — PROGRESS NOTES
I talked with and examined Jerad Ross and I agree with the assessment and the plan. I was present for the biopsy procedures. LENNY Mackay MD.

## 2024-05-09 NOTE — NURSING NOTE
Dermatology Rooming Note    Jerad Ross's goals for this visit include:   Chief Complaint   Patient presents with    Skin Check     Annual FBSE.  Spot of concern on the left wrist, doesn't heal     Anny Peck CMA

## 2024-05-09 NOTE — NURSING NOTE
Lidocaine-epinephrine 1-1:660775 % injection   3mL once for one use, starting 5/9/2024 ending 5/9/2024,  2mL disp, R-0, injection  Injected by Dr. Mitchell

## 2024-05-10 LAB
PATH REPORT.COMMENTS IMP SPEC: NORMAL
PATH REPORT.COMMENTS IMP SPEC: NORMAL
PATH REPORT.FINAL DX SPEC: NORMAL
PATH REPORT.GROSS SPEC: NORMAL
PATH REPORT.MICROSCOPIC SPEC OTHER STN: NORMAL
PATH REPORT.RELEVANT HX SPEC: NORMAL

## 2024-05-14 ENCOUNTER — LAB (OUTPATIENT)
Dept: LAB | Facility: CLINIC | Age: 64
End: 2024-05-14
Payer: COMMERCIAL

## 2024-05-14 DIAGNOSIS — C44.529 SQUAMOUS CELL CARCINOMA OF SKIN OF TRUNK: Primary | ICD-10-CM

## 2024-05-14 DIAGNOSIS — Z48.298 AFTERCARE FOLLOWING ORGAN TRANSPLANT: ICD-10-CM

## 2024-05-14 DIAGNOSIS — Z94.0 KIDNEY TRANSPLANTED: ICD-10-CM

## 2024-05-14 DIAGNOSIS — B18.1 CHRONIC HEPATITIS B (H): ICD-10-CM

## 2024-05-14 LAB
ANION GAP SERPL CALCULATED.3IONS-SCNC: 14 MMOL/L (ref 7–15)
BUN SERPL-MCNC: 12.1 MG/DL (ref 8–23)
CALCIUM SERPL-MCNC: 9.8 MG/DL (ref 8.8–10.2)
CHLORIDE SERPL-SCNC: 102 MMOL/L (ref 98–107)
CREAT SERPL-MCNC: 0.89 MG/DL (ref 0.67–1.17)
DEPRECATED HCO3 PLAS-SCNC: 24 MMOL/L (ref 22–29)
EGFRCR SERPLBLD CKD-EPI 2021: >90 ML/MIN/1.73M2
ERYTHROCYTE [DISTWIDTH] IN BLOOD BY AUTOMATED COUNT: 19.4 % (ref 10–15)
GLUCOSE SERPL-MCNC: 103 MG/DL (ref 70–99)
HCT VFR BLD AUTO: 42.5 % (ref 40–53)
HGB BLD-MCNC: 12.8 G/DL (ref 13.3–17.7)
MCH RBC QN AUTO: 25.7 PG (ref 26.5–33)
MCHC RBC AUTO-ENTMCNC: 30.1 G/DL (ref 31.5–36.5)
MCV RBC AUTO: 85 FL (ref 78–100)
PLATELET # BLD AUTO: 399 10E3/UL (ref 150–450)
POTASSIUM SERPL-SCNC: 4.4 MMOL/L (ref 3.4–5.3)
RBC # BLD AUTO: 4.99 10E6/UL (ref 4.4–5.9)
SODIUM SERPL-SCNC: 140 MMOL/L (ref 135–145)
WBC # BLD AUTO: 7.6 10E3/UL (ref 4–11)

## 2024-05-14 PROCEDURE — 36415 COLL VENOUS BLD VENIPUNCTURE: CPT | Performed by: PATHOLOGY

## 2024-05-14 PROCEDURE — 80048 BASIC METABOLIC PNL TOTAL CA: CPT | Performed by: PATHOLOGY

## 2024-05-14 PROCEDURE — 85027 COMPLETE CBC AUTOMATED: CPT | Performed by: PATHOLOGY

## 2024-05-14 PROCEDURE — 91200 LIVER ELASTOGRAPHY: CPT | Mod: 26 | Performed by: PHYSICIAN ASSISTANT

## 2024-05-24 ENCOUNTER — OFFICE VISIT (OUTPATIENT)
Dept: CARDIOLOGY | Facility: CLINIC | Age: 64
End: 2024-05-24
Payer: COMMERCIAL

## 2024-05-24 VITALS
WEIGHT: 168 LBS | HEART RATE: 66 BPM | DIASTOLIC BLOOD PRESSURE: 66 MMHG | BODY MASS INDEX: 25.46 KG/M2 | HEIGHT: 68 IN | RESPIRATION RATE: 17 BRPM | SYSTOLIC BLOOD PRESSURE: 102 MMHG

## 2024-05-24 DIAGNOSIS — M87.052 AVASCULAR NECROSIS OF BONES OF BOTH HIPS (H): ICD-10-CM

## 2024-05-24 DIAGNOSIS — M87.051 AVASCULAR NECROSIS OF BONES OF BOTH HIPS (H): ICD-10-CM

## 2024-05-24 DIAGNOSIS — N18.6 END STAGE RENAL DISEASE (H): ICD-10-CM

## 2024-05-24 DIAGNOSIS — I15.1 HTN, KIDNEY TRANSPLANT RELATED: ICD-10-CM

## 2024-05-24 DIAGNOSIS — Z95.1 S/P CABG (CORONARY ARTERY BYPASS GRAFT): ICD-10-CM

## 2024-05-24 DIAGNOSIS — G47.33 OSA (OBSTRUCTIVE SLEEP APNEA): ICD-10-CM

## 2024-05-24 DIAGNOSIS — Z94.0 HTN, KIDNEY TRANSPLANT RELATED: ICD-10-CM

## 2024-05-24 DIAGNOSIS — I25.83 CORONARY ARTERIOSCLEROSIS DUE TO LIPID RICH PLAQUE: Primary | ICD-10-CM

## 2024-05-24 DIAGNOSIS — E78.00 PURE HYPERCHOLESTEROLEMIA: ICD-10-CM

## 2024-05-24 PROBLEM — R94.39 ABNORMAL CARDIOVASCULAR STRESS TEST: Status: RESOLVED | Noted: 2021-09-12 | Resolved: 2024-05-24

## 2024-05-24 PROCEDURE — 99214 OFFICE O/P EST MOD 30 MIN: CPT | Performed by: INTERNAL MEDICINE

## 2024-05-24 RX ORDER — CLINDAMYCIN HCL 150 MG
CAPSULE ORAL
COMMUNITY
Start: 2023-11-21

## 2024-05-24 RX ORDER — POTASSIUM CHLORIDE 1500 MG/1
20 TABLET, EXTENDED RELEASE ORAL DAILY
COMMUNITY
Start: 2024-04-17 | End: 2024-05-24

## 2024-05-24 NOTE — PROGRESS NOTES
[facility]  Heart Care Clinic Follow-up Note    Assessment & Plan          (I25.10,  I25.83) Coronary arteriosclerosis due to lipid rich plaque  (primary encounter diagnosis)  Comment:  Recent angiography 2021 secondary to abnormal nuclear stress test showed normal left main, mid LAD 95% lesion with a first diagonal 90% lesion.  The circumflex had a distal 75% lesion with a first obtuse marginal artery 99% stenosis.  The right coronary artery had a proximal 90% followed by mid 99% followed by PDA 80% lesion. Doing well on Imdur.      (Z95.1) S/P CABG (coronary artery bypass graft)  Comment: June 8, 2020 patient underwent coronary bypass grafting with a LIMA to the LAD, vein graft to the right PDA, and vein graft to the right posterior lateral.  Symptomatically doing well and work on prevention.  Did have an episode of chest discomfort and prompting presentation to the emergency department with stress test repeated which was unremarkable.     (E78.00) Pure hypercholesterolemia  Comment: Total cholesterol 151 with an LDL of 67 which is acceptable however this was from October 2022 and have asked him next time he gets blood drawn to have lipids checked and send to us.     (I10) Primary hypertension  Comment: Under good control on metoprolol as well as Imdur and if anything a little bit low today, he is asymptomatic but does have vertigo.  I have asked him to monitor blood pressures at home and if running low let us know.     (Z94.0) Kidney replaced by transplant  Comment: This is a second graft, placed in 1993, currently on chronic prednisone and mycophenolate and doing well.  Creatinine less than 1.     (G47.33) JULIANE (obstructive sleep apnea)  Comment: Intolerant of CPAP but doing well.  Is being evaluated for an oral appliance.    (M87.051,  M87.052) Avascular necrosis of bones of both hips (H)  Comment: So noted with hip revision.    Vertigo-suspect benign positional secondary to crystals middle ear and if  continues to be an issue he should connect with his primary.    Plan  1.  Next blood draw he has done have fasting lipids obtained.  2.  Check blood pressures at home and if running a little bit low let us know and we may need to back off on meds.  3.  Follow-up with me in 1 year or sooner if needed.    Subjective  CC: 64-year-old white gentleman being seen in yearly follow-up.  He lives independently in a house with a roommate.  Not very physically active due to avascular necrosis of hips and unequal lower limb length.  He however is able to get by without any syncope, presyncope, chest pains, palpitations, shortness of breath, PND, orthopnea or peripheral edema.  He does complain of positional vertigo when he lies on his left side however.    Medications  Current Outpatient Medications   Medication Sig Dispense Refill    acetaminophen (TYLENOL) 500 MG tablet Take 1,000 mg by mouth 3 times daily as needed for mild pain      albuterol (PROAIR HFA) 108 (90 Base) MCG/ACT inhaler Inhale 2 puffs into the lungs every 6 hours as needed for shortness of breath / dyspnea or wheezing 1 g 11    aspirin 81 MG EC tablet Take 1 tablet (81 mg) by mouth daily      budesonide (PULMICORT FLEXHALER) 90 MCG/ACT inhaler Inhale 2 puffs into the lungs 2 times daily as needed (shortness of breath)      clindamycin (CLEOCIN) 150 MG capsule TAKE 2 CAPSULES BY MOUTH 1 HOUR PRIOR TO DENTAL APPOINTMENT. TAKE 1 CAPSULE BY MOUTH EVERY 6 HOURS AFTER APPOINTMENT      entecavir (BARACLUDE) 0.5 MG tablet TAKE 1 TABLET(0.5 MG) BY MOUTH DAILY 90 tablet 0    FLUoxetine (PROZAC) 20 MG capsule Take 1 capsule (20 mg) by mouth daily With 40mg for a total daily dose of 60mg 90 capsule 1    FLUoxetine (PROZAC) 40 MG capsule Take 1 capsule (40 mg) by mouth daily 90 capsule 1    fluticasone-salmeterol (ADVAIR) 250-50 MCG/ACT inhaler Inhale 1 puff into the lungs 2 times daily 180 each 3    furosemide (LASIX) 20 MG tablet Take 1 tablet (20 mg) by mouth daily as  "needed (fluid retention) 90 tablet 1    hydrOXYzine HCl (ATARAX) 10 MG tablet Take 1 tablet (10 mg) by mouth daily as needed for anxiety 30 tablet 1    hydrOXYzine HCl (ATARAX) 25 MG tablet Take 1 tablet (25 mg) by mouth nightly as needed for anxiety (sleep) 90 tablet 1    isosorbide mononitrate (IMDUR) 60 MG 24 hr tablet Take 1 tablet (60 mg) by mouth daily 90 tablet 4    latanoprost (XALATAN) 0.005 % ophthalmic solution Place 1 drop into both eyes At Bedtime 2.5 mL 11    metoprolol succinate ER (TOPROL XL) 25 MG 24 hr tablet Take 0.5 tablets (12.5 mg) by mouth daily 90 tablet 3    Multiple Vitamins-Iron (ONE DAILY MULTIVITAMIN/IRON) TABS Take 1 tablet by mouth daily      mycophenolate (GENERIC EQUIVALENT) 250 MG capsule Take 3 capsules (750 mg) by mouth 2 times daily 540 capsule 3    naltrexone (DEPADE/REVIA) 50 MG tablet Take 1 tablet (50 mg) by mouth daily 90 tablet 1    nitroGLYcerin (NITROSTAT) 0.4 MG sublingual tablet Place 1 tablet (0.4 mg) under the tongue every 5 minutes as needed for chest pain 20 tablet 3    Omega-3 Fatty Acids (OMEGA 3 PO) Take 1 capsule by mouth daily Dose unknown      pantoprazole (PROTONIX) 40 MG EC tablet Take 1 tablet (40 mg) by mouth daily 90 tablet 1    polyethylene glycol (MIRALAX) 17 g packet Take 17 g by mouth daily as needed       predniSONE (DELTASONE) 5 MG tablet Take 1 tablet (5 mg) by mouth daily 90 tablet 3    rosuvastatin (CRESTOR) 20 MG tablet TAKE 1 TABLET(20 MG) BY MOUTH DAILY 90 tablet 0    tacrolimus (PROTOPIC) 0.1 % external ointment Apply topically 2 times daily as needed         Objective  /66 (BP Location: Right arm, Patient Position: Sitting, Cuff Size: Adult Regular)   Pulse 66   Resp 17   Ht 1.727 m (5' 7.99\")   Wt 76.2 kg (168 lb)   BMI 25.55 kg/m      General Appearance:    Alert, cooperative, no distress, appears stated age   Head:    Normocephalic, without obvious abnormality, atraumatic   Throat:   Lips, mucosa, and tongue normal; teeth and " "gums normal   Neck:   Supple, symmetrical, trachea midline, no adenopathy;        thyroid:  No enlargement/tenderness/nodules; no carotid    bruit or JVD   Back:     Symmetric, no curvature, ROM normal, no CVA tenderness   Lungs:     Clear to auscultation bilaterally, respirations unlabored   Chest wall:    No tenderness, midline sternotomy scar   Heart:    Regular rate and rhythm, S1 and S2 normal, no murmur, rub   or gallop   Abdomen:     Soft, non-tender, bowel sounds active all four quadrants,     no masses, no organomegaly   Extremities:   Normal, atraumatic, no cyanosis or edema   Pulses:   2+ and symmetric all upper extremities, +1 lower extremities   Skin:   Skin color, texture, turgor normal, no rashes or lesions     Results    Lab Results personally reviewed   Lab Results   Component Value Date    CHOL 151 10/21/2022    CHOL 133 09/20/2021     Lab Results   Component Value Date    HDL 65 10/21/2022    HDL 49 09/20/2021     No components found for: \"LDLCALC\"  Lab Results   Component Value Date    TRIG 94 10/21/2022    TRIG 108 09/20/2021     Lab Results   Component Value Date    WBC 7.6 05/14/2024    HGB 12.8 (L) 05/14/2024    HCT 42.5 05/14/2024     05/14/2024     Lab Results   Component Value Date    BUN 12.1 05/14/2024     05/14/2024    CO2 24 05/14/2024         "

## 2024-05-24 NOTE — LETTER
5/24/2024    Dasia Sosa MD  [address] [facility] [address]    RE: Jerad Ross       Dear Colleague,     I had the pleasure of seeing Jerad Ross in the [facility].      [facility]  Heart Care Clinic Follow-up Note    Assessment & Plan          (I25.10,  I25.83) Coronary arteriosclerosis due to lipid rich plaque  (primary encounter diagnosis)  Comment:  Recent angiography 2021 secondary to abnormal nuclear stress test showed normal left main, mid LAD 95% lesion with a first diagonal 90% lesion.  The circumflex had a distal 75% lesion with a first obtuse marginal artery 99% stenosis.  The right coronary artery had a proximal 90% followed by mid 99% followed by PDA 80% lesion. Doing well on Imdur.      (Z95.1) S/P CABG (coronary artery bypass graft)  Comment: June 8, 2020 patient underwent coronary bypass grafting with a LIMA to the LAD, vein graft to the right PDA, and vein graft to the right posterior lateral.  Symptomatically doing well and work on prevention.  Did have an episode of chest discomfort and prompting presentation to the emergency department with stress test repeated which was unremarkable.     (E78.00) Pure hypercholesterolemia  Comment: Total cholesterol 151 with an LDL of 67 which is acceptable however this was from October 2022 and have asked him next time he gets blood drawn to have lipids checked and send to us.     (I10) Primary hypertension  Comment: Under good control on metoprolol as well as Imdur and if anything a little bit low today, he is asymptomatic but does have vertigo.  I have asked him to monitor blood pressures at home and if running low let us know.     (Z94.0) Kidney replaced by transplant  Comment: This is a second graft, placed in 1993, currently on chronic prednisone and mycophenolate and doing well.  Creatinine less than 1.     (G47.33) JULIANE (obstructive sleep apnea)  Comment: Intolerant of CPAP but doing well.  Is being  evaluated for an oral appliance.    (M87.051,  M87.052) Avascular necrosis of bones of both hips (H)  Comment: So noted with hip revision.    Vertigo-suspect benign positional secondary to crystals middle ear and if continues to be an issue he should connect with his primary.    Plan  1.  Next blood draw he has done have fasting lipids obtained.  2.  Check blood pressures at home and if running a little bit low let us know and we may need to back off on meds.  3.  Follow-up with me in 1 year or sooner if needed.    Subjective  CC: 64-year-old white gentleman being seen in yearly follow-up.  He lives independently in a house with a roommate.  Not very physically active due to avascular necrosis of hips and unequal lower limb length.  He however is able to get by without any syncope, presyncope, chest pains, palpitations, shortness of breath, PND, orthopnea or peripheral edema.  He does complain of positional vertigo when he lies on his left side however.    Medications  Current Outpatient Medications   Medication Sig Dispense Refill    acetaminophen (TYLENOL) 500 MG tablet Take 1,000 mg by mouth 3 times daily as needed for mild pain      albuterol (PROAIR HFA) 108 (90 Base) MCG/ACT inhaler Inhale 2 puffs into the lungs every 6 hours as needed for shortness of breath / dyspnea or wheezing 1 g 11    aspirin 81 MG EC tablet Take 1 tablet (81 mg) by mouth daily      budesonide (PULMICORT FLEXHALER) 90 MCG/ACT inhaler Inhale 2 puffs into the lungs 2 times daily as needed (shortness of breath)      clindamycin (CLEOCIN) 150 MG capsule TAKE 2 CAPSULES BY MOUTH 1 HOUR PRIOR TO DENTAL APPOINTMENT. TAKE 1 CAPSULE BY MOUTH EVERY 6 HOURS AFTER APPOINTMENT      entecavir (BARACLUDE) 0.5 MG tablet TAKE 1 TABLET(0.5 MG) BY MOUTH DAILY 90 tablet 0    FLUoxetine (PROZAC) 20 MG capsule Take 1 capsule (20 mg) by mouth daily With 40mg for a total daily dose of 60mg 90 capsule 1    FLUoxetine (PROZAC) 40 MG capsule Take 1 capsule (40 mg)  "by mouth daily 90 capsule 1    fluticasone-salmeterol (ADVAIR) 250-50 MCG/ACT inhaler Inhale 1 puff into the lungs 2 times daily 180 each 3    furosemide (LASIX) 20 MG tablet Take 1 tablet (20 mg) by mouth daily as needed (fluid retention) 90 tablet 1    hydrOXYzine HCl (ATARAX) 10 MG tablet Take 1 tablet (10 mg) by mouth daily as needed for anxiety 30 tablet 1    hydrOXYzine HCl (ATARAX) 25 MG tablet Take 1 tablet (25 mg) by mouth nightly as needed for anxiety (sleep) 90 tablet 1    isosorbide mononitrate (IMDUR) 60 MG 24 hr tablet Take 1 tablet (60 mg) by mouth daily 90 tablet 4    latanoprost (XALATAN) 0.005 % ophthalmic solution Place 1 drop into both eyes At Bedtime 2.5 mL 11    metoprolol succinate ER (TOPROL XL) 25 MG 24 hr tablet Take 0.5 tablets (12.5 mg) by mouth daily 90 tablet 3    Multiple Vitamins-Iron (ONE DAILY MULTIVITAMIN/IRON) TABS Take 1 tablet by mouth daily      mycophenolate (GENERIC EQUIVALENT) 250 MG capsule Take 3 capsules (750 mg) by mouth 2 times daily 540 capsule 3    naltrexone (DEPADE/REVIA) 50 MG tablet Take 1 tablet (50 mg) by mouth daily 90 tablet 1    nitroGLYcerin (NITROSTAT) 0.4 MG sublingual tablet Place 1 tablet (0.4 mg) under the tongue every 5 minutes as needed for chest pain 20 tablet 3    Omega-3 Fatty Acids (OMEGA 3 PO) Take 1 capsule by mouth daily Dose unknown      pantoprazole (PROTONIX) 40 MG EC tablet Take 1 tablet (40 mg) by mouth daily 90 tablet 1    polyethylene glycol (MIRALAX) 17 g packet Take 17 g by mouth daily as needed       predniSONE (DELTASONE) 5 MG tablet Take 1 tablet (5 mg) by mouth daily 90 tablet 3    rosuvastatin (CRESTOR) 20 MG tablet TAKE 1 TABLET(20 MG) BY MOUTH DAILY 90 tablet 0    tacrolimus (PROTOPIC) 0.1 % external ointment Apply topically 2 times daily as needed         Objective  /66 (BP Location: Right arm, Patient Position: Sitting, Cuff Size: Adult Regular)   Pulse 66   Resp 17   Ht 1.727 m (5' 7.99\")   Wt 76.2 kg (168 lb)   " "BMI 25.55 kg/m      General Appearance:    Alert, cooperative, no distress, appears stated age   Head:    Normocephalic, without obvious abnormality, atraumatic   Throat:   Lips, mucosa, and tongue normal; teeth and gums normal   Neck:   Supple, symmetrical, trachea midline, no adenopathy;        thyroid:  No enlargement/tenderness/nodules; no carotid    bruit or JVD   Back:     Symmetric, no curvature, ROM normal, no CVA tenderness   Lungs:     Clear to auscultation bilaterally, respirations unlabored   Chest wall:    No tenderness, midline sternotomy scar   Heart:    Regular rate and rhythm, S1 and S2 normal, no murmur, rub   or gallop   Abdomen:     Soft, non-tender, bowel sounds active all four quadrants,     no masses, no organomegaly   Extremities:   Normal, atraumatic, no cyanosis or edema   Pulses:   2+ and symmetric all upper extremities, +1 lower extremities   Skin:   Skin color, texture, turgor normal, no rashes or lesions     Results    Lab Results personally reviewed   Lab Results   Component Value Date    CHOL 151 10/21/2022    CHOL 133 09/20/2021     Lab Results   Component Value Date    HDL 65 10/21/2022    HDL 49 09/20/2021     No components found for: \"LDLCALC\"  Lab Results   Component Value Date    TRIG 94 10/21/2022    TRIG 108 09/20/2021     Lab Results   Component Value Date    WBC 7.6 05/14/2024    HGB 12.8 (L) 05/14/2024    HCT 42.5 05/14/2024     05/14/2024     Lab Results   Component Value Date    BUN 12.1 05/14/2024     05/14/2024    CO2 24 05/14/2024           Thank you for allowing me to participate in the care of your patient.      Sincerely,     RENNY KING MD     [facility]  cc:   Astno Perry MD  [address] [place] [id number]  "

## 2024-05-24 NOTE — PATIENT INSTRUCTIONS
Jerad BARRAGAN Vandana,  I enjoyed visiting with you again today.  I am glad to hear you are doing well.  Per our conversation if you have bad light headed spell or feeling off check a blood pressure and if issues call me at [phone]. I am concerned borderline low today.  I will plan on seeing you 1 year.  Sascha Lundberg

## 2024-05-29 ENCOUNTER — TELEPHONE (OUTPATIENT)
Dept: DERMATOLOGY | Facility: CLINIC | Age: 64
End: 2024-05-29
Payer: COMMERCIAL

## 2024-05-29 NOTE — TELEPHONE ENCOUNTER
Left patient a voicemail to schedule a consult & mohs for   2 cm SCCis R lateral chest in transplant pt, needs MMS        with Dr. Santos. Provided my direct phone number.    Katharina Lima on 5/29/2024 at 10:38 AM

## 2024-05-30 DIAGNOSIS — Z94.0 KIDNEY TRANSPLANTED: Primary | ICD-10-CM

## 2024-05-31 RX ORDER — PREDNISONE 5 MG/1
5 TABLET ORAL DAILY
Qty: 90 TABLET | Refills: 3 | Status: SHIPPED | OUTPATIENT
Start: 2024-05-31

## 2024-06-05 NOTE — TELEPHONE ENCOUNTER
Called patient to schedule surgery with Dr. Santos    Date of Surgery: 08/05    Surgery type: Mohs    Consult scheduled: Yes    Has patient had mohs with us before? No    Additional comments: pt requested Mohs date and time.       Katharina Lima on 6/5/2024 at 11:51 AM

## 2024-06-10 ENCOUNTER — MYC MEDICAL ADVICE (OUTPATIENT)
Dept: ADDICTION MEDICINE | Facility: CLINIC | Age: 64
End: 2024-06-10
Payer: COMMERCIAL

## 2024-06-10 NOTE — TELEPHONE ENCOUNTER
1) Reviewed patient's Dialogic message. She would like to schedule an appointment on 7/15/24.     2) She has an appointment on 7/22/24 with Sarahy Mullins.     3) Dialogic message sent to patient.

## 2024-06-19 ENCOUNTER — TRANSFERRED RECORDS (OUTPATIENT)
Dept: HEALTH INFORMATION MANAGEMENT | Facility: CLINIC | Age: 64
End: 2024-06-19
Payer: COMMERCIAL

## 2024-06-24 DIAGNOSIS — H40.053 BORDERLINE GLAUCOMA WITH OCULAR HYPERTENSION, BILATERAL: Primary | ICD-10-CM

## 2024-06-25 DIAGNOSIS — B18.1 CHRONIC VIRAL HEPATITIS B WITHOUT DELTA AGENT AND WITHOUT COMA (H): ICD-10-CM

## 2024-06-25 DIAGNOSIS — B18.1 CHRONIC HEPATITIS B (H): ICD-10-CM

## 2024-06-25 RX ORDER — ENTECAVIR 0.5 MG/1
0.5 TABLET, FILM COATED ORAL DAILY
Qty: 30 TABLET | Refills: 4 | Status: SHIPPED | OUTPATIENT
Start: 2024-06-25

## 2024-06-25 NOTE — PROGRESS NOTES
SUBJECTIVE:    Pt is a 57 year old male with pmh of     Patient Active Problem List   Diagnosis     BCC (basal cell carcinoma), trunk     JULIANE (obstructive sleep apnea)     Hypertension secondary to other renal disorders     CAD (coronary artery disease)     NSTEMI (non-ST elevated myocardial infarction) (H)     Depression     Diverticulosis     Hemorrhoids     Kidney replaced by transplant     Basal cell carcinoma     Squamous cell carcinoma     Dyslipidemia     CRP elevated     Glaucoma     AION (acute ischemic optic neuropathy)     Paracentral scotoma     Hip pain     Inflamed seborrheic keratosis     Intertrigo     Chronic hepatitis B (H)     Immunosuppressed status (H)     Hypogonadism in male     GERD (gastroesophageal reflux disease)     Aftercare following organ transplant       who is here for evaluation of had concerns including URI.    Here for a one mo URI    Postnasal drg, cough, fatigue. No hemoptysis, no ear ache, no sore throat, no GI sx, no fever, no SOB.     No h/o smoke RAD COPD pneumonia.    Is immune suppressed    Shots up to date    Allergies   Allergen Reactions     Penicillins Shortness Of Breath and Hives     Contrast Dye Nausea and Vomiting     Keflex [Cephalexin Hcl] Other (See Comments)     Pt could not recall reaction     Tetracycline Other (See Comments)     Patient could not recall reaction     Sulfa Drugs Rash           Current Outpatient Prescriptions   Medication Sig Dispense Refill     azithromycin (ZITHROMAX) 250 MG tablet Two tablets first day, then one tablet daily for four days. 6 tablet 0     entecavir (BARACLUDE) 0.5 MG tablet TAKE 1 TABLET BY MOUTH EVERY DAY 30 tablet 5     albuterol (PROAIR HFA/PROVENTIL HFA/VENTOLIN HFA) 108 (90 BASE) MCG/ACT Inhaler Inhale 2 puffs into the lungs every 4 hours as needed for shortness of breath / dyspnea or wheezing 6.7 g 1     predniSONE (DELTASONE) 20 MG tablet Take 1 tablet (20 mg) by mouth daily 5 tablet 0     FLUoxetine (PROZAC) 40 MG  capsule Take 1 capsule (40 mg) by mouth every morning 90 capsule 0     amLODIPine (NORVASC) 5 MG tablet Take 1 tablet (5 mg) by mouth daily 90 tablet 1     mycophenolate (CELLCEPT - GENERIC EQUIVALENT) 250 MG capsule Take 3 capsules (750 mg) by mouth 2 times daily 180 capsule 11     isosorbide mononitrate (IMDUR) 30 MG 24 hr tablet Take 0.5 tablets (15 mg) by mouth daily 45 tablet 0     predniSONE (DELTASONE) 5 MG tablet Take 1 tablet (5 mg) by mouth daily 90 tablet 3     latanoprost (XALATAN) 0.005 % ophthalmic solution Place 1 drop into both eyes At Bedtime 3 Bottle 3     losartan (COZAAR) 50 MG tablet Take 1 tablet (50 mg) by mouth daily 90 tablet 3     clopidogrel (PLAVIX) 75 MG tablet Take 1 tablet (75 mg) by mouth daily 90 tablet 3     ibuprofen (ADVIL,MOTRIN) 200 MG tablet Take 200 mg by mouth every 4 hours as needed for mild pain       atorvastatin (LIPITOR) 20 MG tablet Take 1 tablet (20 mg) by mouth daily You are rox to see your Cardiologist call [phone] to schedule. 90 tablet 1     BETA BLOCKER NOT PRESCRIBED, INTENTIONAL, Beta Blocker not prescribed intentionally due to Bradycardia < 50 bpm without beta blocker therapy  0     aspirin 81 MG tablet Take by mouth daily       Omega-3 Fatty Acids (OMEGA 3 PO) Take 1 capsule by mouth daily       omeprazole (PRILOSEC OTC) 20 MG tablet Take  by mouth daily. 30 tablet      calcium citrate-vitamin D (CITRACAL) 315-200 MG-UNIT TABS Take 1 tablet by mouth daily.       Multiple Vitamins-Iron (MULTIVITAMIN/IRON PO) Take 1 tablet by mouth daily        acetaminophen (TYLENOL) 325 MG tablet Take 1-2 tablets by mouth every 6 hours as needed.         Social History   Substance Use Topics     Smoking status: Former Smoker     Packs/day: 1.00     Years: 9.00     Quit date: 1/1/1988     Smokeless tobacco: Former User     Alcohol use 0.0 oz/week     0 Standard drinks or equivalent per week      Comment: rarely 1 drink per month       No sick exposures  known    OBJECTIVE:  /77 (BP Location: Right arm, Patient Position: Chair, Cuff Size: Adult Regular)  Pulse 72  Temp 98.4  F (36.9  C) (Oral)  Resp 16  Wt 84.8 kg (187 lb)  SpO2 97%  BMI 28.43 kg/m2  GENERAL APPEARANCE: Alert, no acute distress  EYES: PERRL, EOM normal, conjunctiva and lids normal  HENT: Ears and TMs normal, oral mucosa and posterior oropharynx normal  NECK: No adenopathy,masses or thyromegaly  RESP: lungs clear to auscultation   NEURO: Alert, oriented, speech and mentation normal  PSYCHE: mentation appears normal, affect and mood normal    ASSESSMENT/PLAN:    One mo URI sx, immune suppressed, neg exam  Rx as bronchitsi  Dewey, f/u prn    LEVY ROWE MD       36.7

## 2024-06-26 ENCOUNTER — MYC REFILL (OUTPATIENT)
Dept: CARDIOLOGY | Facility: CLINIC | Age: 64
End: 2024-06-26
Payer: COMMERCIAL

## 2024-06-26 ENCOUNTER — TELEPHONE (OUTPATIENT)
Dept: DERMATOLOGY | Facility: CLINIC | Age: 64
End: 2024-06-26
Payer: COMMERCIAL

## 2024-06-26 DIAGNOSIS — E78.00 PURE HYPERCHOLESTEROLEMIA: ICD-10-CM

## 2024-06-26 NOTE — TELEPHONE ENCOUNTER
Left Voicemail (1st Attempt) for the patient to call back and schedule the following:    Appointment type: FOLLOW UP    Provider: rama    Return date: 11/2024     Specialty phone number: [phone]    Additonal Notes: na

## 2024-06-27 DIAGNOSIS — F33.41 RECURRENT MAJOR DEPRESSIVE DISORDER, IN PARTIAL REMISSION (H): ICD-10-CM

## 2024-06-27 RX ORDER — FLUOXETINE 40 MG/1
40 CAPSULE ORAL DAILY
Qty: 90 CAPSULE | Refills: 0 | Status: SHIPPED | OUTPATIENT
Start: 2024-06-27 | End: 2024-08-29

## 2024-06-27 RX ORDER — ROSUVASTATIN CALCIUM 20 MG/1
20 TABLET, COATED ORAL DAILY
Qty: 90 TABLET | Refills: 0 | Status: SHIPPED | OUTPATIENT
Start: 2024-06-27

## 2024-06-27 NOTE — TELEPHONE ENCOUNTER
Last Seen: 11/17/2023  RTC: 3 to 6 months  Cancel: None  No-Show: None  Next Appt: None. [employer] message from scheduling has been sent to the pt.     Incoming Refill From Pharmacy via [employer]    Medication Requested #1: FLUoxetine (PROZAC) 20 MG capsule   Directions: Take 1 capsule (20 mg) by mouth daily With 40mg for a total daily dose of 60mg - Oral   Qty: 90  Last Refill: 5/29/2024    Medication Requested #2: FLUoxetine (PROZAC) 40 MG capsule   Directions: Take 1 capsule (40 mg) by mouth daily - Oral   Qty: 90  Last Refill: 5/29/2024      Medication Refill pended Per Refill Protocol

## 2024-06-28 ENCOUNTER — TELEPHONE (OUTPATIENT)
Dept: DERMATOLOGY | Facility: CLINIC | Age: 64
End: 2024-06-28
Payer: COMMERCIAL

## 2024-07-03 DIAGNOSIS — E87.6 HYPOKALEMIA: ICD-10-CM

## 2024-07-05 DIAGNOSIS — Z94.0 KIDNEY TRANSPLANTED: Primary | ICD-10-CM

## 2024-07-05 RX ORDER — MYCOPHENOLATE MOFETIL 250 MG/1
750 CAPSULE ORAL 2 TIMES DAILY
Qty: 540 CAPSULE | Refills: 0 | Status: SHIPPED | OUTPATIENT
Start: 2024-07-05 | End: 2024-10-03

## 2024-07-09 RX ORDER — POTASSIUM CHLORIDE 1500 MG/1
20 TABLET, EXTENDED RELEASE ORAL DAILY
Qty: 100 TABLET | Refills: 0 | OUTPATIENT
Start: 2024-07-09

## 2024-07-18 ASSESSMENT — ANXIETY QUESTIONNAIRES
GAD7 TOTAL SCORE: 9
2. NOT BEING ABLE TO STOP OR CONTROL WORRYING: SEVERAL DAYS
4. TROUBLE RELAXING: MORE THAN HALF THE DAYS
IF YOU CHECKED OFF ANY PROBLEMS ON THIS QUESTIONNAIRE, HOW DIFFICULT HAVE THESE PROBLEMS MADE IT FOR YOU TO DO YOUR WORK, TAKE CARE OF THINGS AT HOME, OR GET ALONG WITH OTHER PEOPLE: SOMEWHAT DIFFICULT
7. FEELING AFRAID AS IF SOMETHING AWFUL MIGHT HAPPEN: SEVERAL DAYS
6. BECOMING EASILY ANNOYED OR IRRITABLE: SEVERAL DAYS
7. FEELING AFRAID AS IF SOMETHING AWFUL MIGHT HAPPEN: SEVERAL DAYS
8. IF YOU CHECKED OFF ANY PROBLEMS, HOW DIFFICULT HAVE THESE MADE IT FOR YOU TO DO YOUR WORK, TAKE CARE OF THINGS AT HOME, OR GET ALONG WITH OTHER PEOPLE?: SOMEWHAT DIFFICULT
GAD7 TOTAL SCORE: 9
1. FEELING NERVOUS, ANXIOUS, OR ON EDGE: MORE THAN HALF THE DAYS
GAD7 TOTAL SCORE: 9
5. BEING SO RESTLESS THAT IT IS HARD TO SIT STILL: SEVERAL DAYS
3. WORRYING TOO MUCH ABOUT DIFFERENT THINGS: SEVERAL DAYS

## 2024-07-22 ENCOUNTER — VIRTUAL VISIT (OUTPATIENT)
Dept: ADDICTION MEDICINE | Facility: CLINIC | Age: 64
End: 2024-07-22
Payer: COMMERCIAL

## 2024-07-22 DIAGNOSIS — F41.1 GENERALIZED ANXIETY DISORDER: ICD-10-CM

## 2024-07-22 DIAGNOSIS — F10.20 ALCOHOL USE DISORDER, SEVERE, DEPENDENCE (H): Primary | ICD-10-CM

## 2024-07-22 DIAGNOSIS — G47.00 INSOMNIA, UNSPECIFIED TYPE: ICD-10-CM

## 2024-07-22 PROCEDURE — 99214 OFFICE O/P EST MOD 30 MIN: CPT | Mod: 95 | Performed by: NURSE PRACTITIONER

## 2024-07-22 PROCEDURE — G2211 COMPLEX E/M VISIT ADD ON: HCPCS | Mod: 95 | Performed by: NURSE PRACTITIONER

## 2024-07-22 ASSESSMENT — PATIENT HEALTH QUESTIONNAIRE - PHQ9
10. IF YOU CHECKED OFF ANY PROBLEMS, HOW DIFFICULT HAVE THESE PROBLEMS MADE IT FOR YOU TO DO YOUR WORK, TAKE CARE OF THINGS AT HOME, OR GET ALONG WITH OTHER PEOPLE: SOMEWHAT DIFFICULT
SUM OF ALL RESPONSES TO PHQ QUESTIONS 1-9: 9
SUM OF ALL RESPONSES TO PHQ QUESTIONS 1-9: 9

## 2024-07-22 NOTE — PROGRESS NOTES
Virtual Visit Details    Type of service:  Video Visit   Video Start Time:  1055  Video End Time:11:18 AM    Originating Location (pt. Location): Home    Distant Location (provider location):  On-site  Platform used for Video Visit: [facility] [facility]    A/P                                                    ASSESSMENT/PLAN  1. Alcohol use disorder, severe, dependence (H)  Stable, denies cravings or return to use, last drink remains 8/5/2023.   Is taking naltrexone 50 mg daily, continue. No refill needed today.   Continue AA attendance and working with sponsor.     2. Generalized anxiety disorder  3. Insomnia, unspecified type  Overall is stable. Is taking hydroxyzine and prozac prescribed by psychiatry. Did experience an episode of increased anxiety and took 25 mg during daytime hours, is prescribed 10 mg during daytime and 25 mg at bedtime. Discussed with his sponsor who has recommended that he change his sober date.   Encouraged to schedule follow up appointment with his psych provider.     No orders of the defined types were placed in this encounter.      Problem list updated Jul 22, 2024   No problems updated.          PDMP Review         Value Time User    State PDMP site checked  Yes 7/22/2024 11:17 AM Sarahy Mullins CNP              RTC  Return in about 13 weeks (around 10/21/2024) for Follow up, with me, using a video visit 1100 am.      Counseled the patient on the importance of having a recovery program in addition to medication to manage recovery.  Components include avoiding isolating, having willingness to change, avoiding triggers and managing cravings. Encouraged having some type of sober network and practicing honesty with trusted support person(s). Encouraged other services such as counseling, 12 step or other self-help organizations.              SUBJECTIVE                                                    Jerad Ross is a 63 year old male with history of HTN,  "JULIANE, , CAD s/p CABG x3 9/2020, depression with anxiety, and alcohol use who presents for follow up.      Brief history: Initial visit 8/14/23. Jerad  is requesting a chemical health assessment to see if he \"should go to treatment of if AA is enough\". He reports that he began drinking 8 shots of whiskey 1-3 times weekly for past 4 months. States his depression and anxiety as well as opiate withdrawal triggered use escalating use. Has been prescribed opioid pain medication on/off since 3/2023 due to right hip pain 2/2 avascular necrosis which was revised 6/2023 and admits to taking more than prescribed on occasion and states that he realized he was taking them sometimes due to his emotions versus physical pain. He has not taken an opioid pain medication for past 3 weeks. States if he ever requires opioid pain medication in future, prefers to be tapered off.      He had a period of 4 years 2879-5542 of total abstinence from alcohol States he went to treatment for \"sexual compulsivity\" followed by a \"sober house\" and he just maintained abstinence after leaving sober [facility]. States he went to sober house because he needed somewhere to go after leaving the [facility]. He is attending a 12 step group for his sexual compulsions. Has recently started attending AA as well.      Remote history of renal failure as an adolescent. Was on dialysis at age 16, and s/p kidney transplant at age 18. States he contracted hepatitis B from dialysis,      Substance Use History:   \"Have you ever had any history with [...] use?\" And \"When was your last use?  ALCOHOL - Drinks 8 shots of whiskey 1-3 times weekly for past 4 months, last drink 8/5/23.   CANNABIS - Denies  PRESCRIPTION STIMULANTS (includes Ritalin, Adderall, Vyvanse) - Denies  COCAINE/CRACK - Denies  METH/AMPHETAMINES (includes ecstacy, MDMA/connie) - Denies  OPIATES - Prescribed opiate pain medication on/off since 3/2023 due to right hip pain with revision 6/19/2023. States he took " "as prescribed mostly, but does admit to taking more than  prescribed on a few occasions. He needs to be tapered off of opioids in future as he did experience opiate w/d and began drinking to help with symptoms. No longer has prescription for opioids.   BENZODIAZEPINES (includes Ativan, Klonopin, Xanax) - Denies  KRATOM (mild opioid and stimulant effects) - Denies  KETAMINE - Denies  HALLUCINOGENS (includes DXM) - Denies  BEHAVIORAL (Gambling, Eating d/o, Compulsivity) - Sexual compulsivirty  History of treatment - Yes the [facility], \"sober house\", and 12 step  NICOTINE  Cigarettes: Denies       Recent HPI Details: 4/15/24  Just returned home his trip to [place]. Enjoyed spending time with his sister and mom. Sister has some horses and enjoyed that as well.   Denies return to alcohol use. There was some alcohol in the home and he did have an occasional thought \"it would be nice\", did not have any close calls.   Still taking naltrexone 50 mg daily.   Was attending his meetings online, did not have access to a car.   Health has been \"pretty stable\"   Started a You tube channel about EDC (everyday carry) for people with chronic illness/disability, carrying about oneself so that has been really interesting.      Still experiencing increased  anxiety will schedule appointment with psychiatry. Taking Prozac and hydroxyzine. Is still using lavender oil and trying some other things to help.   1. Alcohol use disorder, severe, dependence (H)  Controlled. Denies cravings or return to use, last drink remains 8/5/2023. Continues to take naltrexone 50 mg daily and attending AA meeting regularly.   Continue naltrexone without changes  Continue recovery supports,   - naltrexone (DEPADE/REVIA) 50 MG tablet; Take 1 tablet (50 mg) by mouth daily  Dispense: 90 tablet; Refill: 1     2. Generalized anxiety disorder  Reporting ongoing symptoms. Is taking prozac and hydroxyzine prescribed by psychiatry. Encouraged scheduling follow up appointment. "   Continue non-pharm interventions.     TODAY'S VISIT  HPI Jul 22, 2024    Things have been stable, pretty good.  Has been enjoying summer time.   Still going to his meetings. Did go to [place] for an SA conference.   Still taking naltrexone daily. Last drink 8/5/2023  2 months ago he took 25 mg of hydroxyzine to reduce his anxiety. He does take 10 mg daily as needed, and made the decision to take the 25 mg. His sponsor made him  change his sober date.   Still seeing psychiatry, needs to schedule a follow up. Still taking prozac and hydroxyzine. And [employer].   Has not been taking hydroxyzine very often at all.   Had a good discussion with ortho. Was able to ask for what he needed.             10/26/2023     3:00 PM 4/15/2024    10:47 AM 7/22/2024    10:46 AM   PHQ   PHQ-9 Total Score 4 7 9   Q9: Thoughts of better off dead/self-harm past 2 weeks Not at all Not at all Not at all       OBJECTIVE                                                    PHYSICAL EXAM:  There were no vitals taken for this visit.      Physical Exam  Pulmonary:      Effort: Pulmonary effort is normal. No respiratory distress.   Neurological:      General: No focal deficit present.      Mental Status: He is alert and oriented to person, place, and time.   Psychiatric:         Attention and Perception: Attention normal.         Mood and Affect: Mood normal.         Speech: Speech normal.         Behavior: Behavior is cooperative.         Thought Content: Thought content normal. Thought content does not include suicidal ideation.         Judgment: Judgment normal.         LAB      HISTORY                                                    Problem list reviewed & adjusted, as indicated.  Patient Active Problem List   Diagnosis    BCC (basal cell carcinoma), trunk    JULIANE (obstructive sleep apnea)    HTN, kidney transplant related    Coronary arteriosclerosis due to lipid rich plaque    NSTEMI (non-ST elevated myocardial infarction) (H)     Depression    Diverticulosis    Hemorrhoids    Kidney replaced by transplant    Basal cell carcinoma    Squamous cell carcinoma    Dyslipidemia    CRP elevated    Glaucoma    AION (acute ischemic optic neuropathy)    Paracentral scotoma    Hip pain    Inflamed seborrheic keratosis    Intertrigo    Chronic hepatitis B (H)    Immunosuppression (H24)    Hypogonadism in male    GERD (gastroesophageal reflux disease)    Aftercare following organ transplant    Acute midline low back pain without sciatica    Septic bursitis    Impaired mobility    CAD -- S/P CABG x 3 in 2020    HTN (hypertension)    End stage renal disease (H)    Hip pain, right    Avascular necrosis of bones of both hips (H)    Chest pain, Probably chest wall in origin    Preoperative examination    Coronary artery disease of native artery of native heart with stable angina pectoris (H24)    Failed total hip arthroplasty, sequela    Generalized muscle weakness    Generalized anxiety disorder    Transient hypotension    Hypokalemia    Alcohol use disorder, severe, dependence (H)    Hypoxia    Multiple subsegmental pulmonary emboli without acute cor pulmonale (H)    Hypomagnesemia    General weakness    Unable to ambulate    Nonadherence to medication    Pure hypercholesterolemia         MEDICATION LIST (prior to visit)  Current Outpatient Medications   Medication Sig Dispense Refill    acetaminophen (TYLENOL) 500 MG tablet Take 1,000 mg by mouth 3 times daily as needed for mild pain      albuterol (PROAIR HFA) 108 (90 Base) MCG/ACT inhaler Inhale 2 puffs into the lungs every 6 hours as needed for shortness of breath / dyspnea or wheezing 1 g 11    aspirin 81 MG EC tablet Take 1 tablet (81 mg) by mouth daily      budesonide (PULMICORT FLEXHALER) 90 MCG/ACT inhaler Inhale 2 puffs into the lungs 2 times daily as needed (shortness of breath)      clindamycin (CLEOCIN) 150 MG capsule TAKE 2 CAPSULES BY MOUTH 1 HOUR PRIOR TO DENTAL APPOINTMENT. TAKE 1 CAPSULE BY  MOUTH EVERY 6 HOURS AFTER APPOINTMENT      entecavir (BARACLUDE) 0.5 MG tablet TAKE 1 TABLET(0.5 MG) BY MOUTH DAILY 30 tablet 4    FLUoxetine (PROZAC) 20 MG capsule Take 1 capsule (20 mg) by mouth daily With 40mg for a total daily dose of 60mg, please call  for future refills 90 capsule 0    FLUoxetine (PROZAC) 40 MG capsule Take 1 capsule (40 mg) by mouth daily Please call [phone] to schedule an appointment for future refills 90 capsule 0    fluticasone-salmeterol (ADVAIR) 250-50 MCG/ACT inhaler Inhale 1 puff into the lungs 2 times daily 180 each 3    furosemide (LASIX) 20 MG tablet Take 1 tablet (20 mg) by mouth daily as needed (fluid retention) 90 tablet 1    hydrOXYzine HCl (ATARAX) 10 MG tablet Take 1 tablet (10 mg) by mouth daily as needed for anxiety 30 tablet 1    hydrOXYzine HCl (ATARAX) 25 MG tablet Take 1 tablet (25 mg) by mouth nightly as needed for anxiety (sleep) 90 tablet 1    isosorbide mononitrate (IMDUR) 60 MG 24 hr tablet Take 1 tablet (60 mg) by mouth daily 90 tablet 4    latanoprost (XALATAN) 0.005 % ophthalmic solution Place 1 drop into both eyes At Bedtime 2.5 mL 11    metoprolol succinate ER (TOPROL XL) 25 MG 24 hr tablet Take 0.5 tablets (12.5 mg) by mouth daily 90 tablet 3    Multiple Vitamins-Iron (ONE DAILY MULTIVITAMIN/IRON) TABS Take 1 tablet by mouth daily      mycophenolate (GENERIC EQUIVALENT) 250 MG capsule Take 3 capsules (750 mg) by mouth 2 times daily 540 capsule 0    naltrexone (DEPADE/REVIA) 50 MG tablet Take 1 tablet (50 mg) by mouth daily 90 tablet 1    nitroGLYcerin (NITROSTAT) 0.4 MG sublingual tablet Place 1 tablet (0.4 mg) under the tongue every 5 minutes as needed for chest pain 20 tablet 3    Omega-3 Fatty Acids (OMEGA 3 PO) Take 1 capsule by mouth daily Dose unknown      pantoprazole (PROTONIX) 40 MG EC tablet Take 1 tablet (40 mg) by mouth daily 90 tablet 1    polyethylene glycol (MIRALAX) 17 g packet Take 17 g by mouth daily as needed        predniSONE (DELTASONE) 5 MG tablet Take 1 tablet (5 mg) by mouth daily 90 tablet 3    rosuvastatin (CRESTOR) 20 MG tablet Take 1 tablet (20 mg) by mouth daily 90 tablet 0    tacrolimus (PROTOPIC) 0.1 % external ointment Apply topically 2 times daily as needed       No current facility-administered medications for this visit.       MEDICATION LIST (after visit)  Current Outpatient Medications   Medication Sig Dispense Refill    acetaminophen (TYLENOL) 500 MG tablet Take 1,000 mg by mouth 3 times daily as needed for mild pain      albuterol (PROAIR HFA) 108 (90 Base) MCG/ACT inhaler Inhale 2 puffs into the lungs every 6 hours as needed for shortness of breath / dyspnea or wheezing 1 g 11    aspirin 81 MG EC tablet Take 1 tablet (81 mg) by mouth daily      budesonide (PULMICORT FLEXHALER) 90 MCG/ACT inhaler Inhale 2 puffs into the lungs 2 times daily as needed (shortness of breath)      clindamycin (CLEOCIN) 150 MG capsule TAKE 2 CAPSULES BY MOUTH 1 HOUR PRIOR TO DENTAL APPOINTMENT. TAKE 1 CAPSULE BY MOUTH EVERY 6 HOURS AFTER APPOINTMENT      entecavir (BARACLUDE) 0.5 MG tablet TAKE 1 TABLET(0.5 MG) BY MOUTH DAILY 30 tablet 4    FLUoxetine (PROZAC) 20 MG capsule Take 1 capsule (20 mg) by mouth daily With 40mg for a total daily dose of 60mg, please call  for future refills 90 capsule 0    FLUoxetine (PROZAC) 40 MG capsule Take 1 capsule (40 mg) by mouth daily Please call [phone] to schedule an appointment for future refills 90 capsule 0    fluticasone-salmeterol (ADVAIR) 250-50 MCG/ACT inhaler Inhale 1 puff into the lungs 2 times daily 180 each 3    furosemide (LASIX) 20 MG tablet Take 1 tablet (20 mg) by mouth daily as needed (fluid retention) 90 tablet 1    hydrOXYzine HCl (ATARAX) 10 MG tablet Take 1 tablet (10 mg) by mouth daily as needed for anxiety 30 tablet 1    hydrOXYzine HCl (ATARAX) 25 MG tablet Take 1 tablet (25 mg) by mouth nightly as needed for anxiety (sleep) 90 tablet 1    isosorbide  mononitrate (IMDUR) 60 MG 24 hr tablet Take 1 tablet (60 mg) by mouth daily 90 tablet 4    latanoprost (XALATAN) 0.005 % ophthalmic solution Place 1 drop into both eyes At Bedtime 2.5 mL 11    metoprolol succinate ER (TOPROL XL) 25 MG 24 hr tablet Take 0.5 tablets (12.5 mg) by mouth daily 90 tablet 3    Multiple Vitamins-Iron (ONE DAILY MULTIVITAMIN/IRON) TABS Take 1 tablet by mouth daily      mycophenolate (GENERIC EQUIVALENT) 250 MG capsule Take 3 capsules (750 mg) by mouth 2 times daily 540 capsule 0    naltrexone (DEPADE/REVIA) 50 MG tablet Take 1 tablet (50 mg) by mouth daily 90 tablet 1    nitroGLYcerin (NITROSTAT) 0.4 MG sublingual tablet Place 1 tablet (0.4 mg) under the tongue every 5 minutes as needed for chest pain 20 tablet 3    Omega-3 Fatty Acids (OMEGA 3 PO) Take 1 capsule by mouth daily Dose unknown      pantoprazole (PROTONIX) 40 MG EC tablet Take 1 tablet (40 mg) by mouth daily 90 tablet 1    polyethylene glycol (MIRALAX) 17 g packet Take 17 g by mouth daily as needed       predniSONE (DELTASONE) 5 MG tablet Take 1 tablet (5 mg) by mouth daily 90 tablet 3    rosuvastatin (CRESTOR) 20 MG tablet Take 1 tablet (20 mg) by mouth daily 90 tablet 0    tacrolimus (PROTOPIC) 0.1 % external ointment Apply topically 2 times daily as needed       No current facility-administered medications for this visit.         Allergies   Allergen Reactions    Penicillins Shortness Of Breath and Hives    Keflex [Cephalexin Hcl] Unknown     Patient could not recall reaction    Sulfa Antibiotics Rash    Tetracycline Unknown     Patient could not recall reaction        Continued Complex Management  The longitudinal plan of care for Alcohol Use Disorder (AUD) was addressed during this visit. Due to the added complexity in care, I will continue to support Jerad in the subsequent management and with ongoing continuity of care.        Sarahy Mullins, DNP,MSN, AGNP-C  [facility]   [address], [address]   [place] [address]   Phone # [phone]

## 2024-07-22 NOTE — NURSING NOTE
Is the patient currently in the state of [place]? YES    Current patient location: [address] [place] [address]    Visit mode:VIDEO    If the visit is dropped, the patient can be reconnected by: VIDEO VISIT: Text to cell phone:   Telephone Information:   Mobile [phone]       Will anyone else be joining the visit? No  (If patient encounters technical issues they should call [phone])    How would you like to obtain your AVS? MyChart    Are changes needed to the allergy or medication list? No    Rooming Documentation: Assigned questionnaire(s) completed .    Reason for visit: JEREMY Giron

## 2024-07-23 ENCOUNTER — VIRTUAL VISIT (OUTPATIENT)
Dept: DERMATOLOGY | Facility: CLINIC | Age: 64
End: 2024-07-23
Payer: COMMERCIAL

## 2024-07-23 DIAGNOSIS — D04.5 SQUAMOUS CELL CARCINOMA IN SITU (SCCIS) OF SKIN OF CHEST: Primary | ICD-10-CM

## 2024-07-23 PROCEDURE — 99207 PR NO BILLABLE SERVICE THIS VISIT: CPT | Mod: 93 | Performed by: DERMATOLOGY

## 2024-07-23 NOTE — NURSING NOTE
Excision/Mohs previsit information                                                    Diagnosis: squamous cell carcinoma  Site(s): right lateral chest    Is the surgical site below the waist?  NO  If yes, instruct the patient to purchase over the counter chlorhexidine surgical soap and wash all skin below the belly button twice before surgery    Allergies   Allergen Reactions    Penicillins Shortness Of Breath and Hives    Keflex [Cephalexin Hcl] Unknown     Patient could not recall reaction    Sulfa Antibiotics Rash    Tetracycline Unknown     Patient could not recall reaction       Review and update allergy and medication list    Do you take the following medications:  Coumadin, Eliquis, Pradaxa, Xarelto:  NO.  If on Coumadin, INR should be checked within 7 days of surgery.  Range should be 3.5 or less or within therapeutic range.    Do you currently or have you previously had any of the following conditions:  Hepatitis:  YES, Hep B  HIV/AIDS:  NO  Prolonged bleeding or bleeding disorder:  NO  Pacemaker: NO  Defibrillator:  NO  History of artificial or heart valve replacement:  NO  Endocarditis (inflammation of the inner lining of the heart's chambers and valves):  NO  Have you ever had a prosthetic joint infection:  YES, pt notified to reach out to orthopedic surgeon to discuss if a prophylactic antibiotic is needed prior to skin surgery.  Pregnant or Breastfeeding:  N/A  Mobility device (wheelchair, transfer difficulty): NO    Important Reminders:                                                      -Ok to take all of their medications as prescribed  -Patients can eat, no need to be fasting  -If face is being treated, please come with a make-up free face  -If scalp is being treated, please come with clean hair free from product  -Patient will not be able to get the site wet for 48 hrs  -No submerging wound in standing water (lake, pool, bathtub, hot tub) for 2 weeks  -No physical activity for 48 hrs (further  restrictions will be discussed by MD at time of visit)    If any positives, send to RN for further review  Nicole Murillo RN      Hep B

## 2024-07-23 NOTE — LETTER
7/23/2024      Jerad Ross  [address] [place] [address]      Dear Colleague,    Thank you for referring your patient, Jerad Ross, to the [facility]. Please see a copy of my visit note below.    [facility] Dermatology Note  Encounter Date: Jul 23, 2024  Store-and-Forward and Telephone. Location of teledermatologist: [facility].  Start time: 1:48 pm. End time: 1:50 pm.    Dermatologic Surgery Telemedicine Consult Note    Dermatology Problem List:  1.History of kidney transplant 10/6/93  - Immunosuppression: prednisone 5mg, mycophenolate 750 mg BID (previously on cyclosporine, imuran)  2. NMSC  - SCCIS, right lateral chest, s/p bx 5/9/24, scheduled for MMS at [facility] on 8/5/24   - BCC, right axilla s/p MMS 4/9/12  - SCC, central chest s/p excision 2009  - SCC, unspecified location s/p excision 1999  3. Seborrheic and verrucous keratoses   4. Filiform warts   - left lateral eyelid, left lateral canthus, left temple s/p cryo 3/15/17, 7/19/22  5. Acrochordons   - L anterior neck s/p cryo 10/8/19  6. L thigh epidermal inclusion cyst   7.Prurigo nodularis, left forearm  - s/p cryotherapy 11/10/20  8. AK's, s/p cryo      CC: Skin Cancer (Discuss treatment for SCCIS on right lateral chest)      Subjective: Jerad Ross is a 64 year old male who presents today for Mohs micrographic surgery consultation for a recent diagnosis of skin cancer.  - Skin cancer(s): SCCis  - Location(s): right lateral chest  - reports that he has had Mohs surgery before  -   - no other concerns today         Objective:   Skin: Focused examination of the right lateral chest within the teledermatology photograph(s) from the biopsy was performed.   - a 2 cm pink papule on the right lateral chest    Path report:   Collection date: 5/9/24  Case: [id number]   Final Diagnosis   A(1). Skin, R lateral chest, shave:  - Squamous cell carcinoma in situ - (see description)      B(2). Skin, L forearm, shave:  - Verrucous keratosis - (see description)       Assessment and Plan:     1. Plan for Mohs micrographic surgery for skin cancer(s) above:  *Review lab result(s): Dermpath report   - We discussed the nature of the diagnosis/condition above. We discussed the treatment options, including the risks benefits and expectations of these options. We recommend micrographic surgery as the most effective and most tissue sparing option for treatment, and the patient agrees to proceed with this.  The patient is aware of the risks, benefits and expectations of this procedure. The patient will be scheduled for this procedure, if not already done so.  - We anticipate the following closure type: Linear closure, such as complex linear closure (CLC)    The patient was discussed with and evaluated by attending physician, Brett Santos MD.    Staff Involved:   Scribe/Fellow/Staff     Scribe Disclosure:   I, CHRISTA FLORES, am serving as a scribe; to document services personally performed by Brett Santos MD -based on data collection and the provider's statements to me.    Attending Attestation  I attest that the Scribe recorded the interview and exam that I personally performed.  I have reviewed the note and edited it as necessary.    Brett Santos M.D.  Professor  Director of Dermatologic Surgery  Department of Dermatology  [facility]        Again, thank you for allowing me to participate in the care of your patient.        Sincerely,        Brett Santos MD

## 2024-07-23 NOTE — PROGRESS NOTES
[facility] Dermatology Note  Encounter Date: Jul 23, 2024  Store-and-Forward and Telephone. Location of teledermatologist: [facility].  Start time: 1:48 pm. End time: 1:50 pm.    Dermatologic Surgery Telemedicine Consult Note    Dermatology Problem List:  1.History of kidney transplant 10/6/93  - Immunosuppression: prednisone 5mg, mycophenolate 750 mg BID (previously on cyclosporine, imuran)  2. NMSC  - SCCIS, right lateral chest, s/p bx 5/9/24, scheduled for MMS at [facility] on 8/5/24   - BCC, right axilla s/p MMS 4/9/12  - SCC, central chest s/p excision 2009  - SCC, unspecified location s/p excision 1999  3. Seborrheic and verrucous keratoses   4. Filiform warts   - left lateral eyelid, left lateral canthus, left temple s/p cryo 3/15/17, 7/19/22  5. Acrochordons   - L anterior neck s/p cryo 10/8/19  6. L thigh epidermal inclusion cyst   7.Prurigo nodularis, left forearm  - s/p cryotherapy 11/10/20  8. AK's, s/p cryo      CC: Skin Cancer (Discuss treatment for SCCIS on right lateral chest)      Subjective: Jerad Ross is a 64 year old male who presents today for Mohs micrographic surgery consultation for a recent diagnosis of skin cancer.  - Skin cancer(s): SCCis  - Location(s): right lateral chest  - reports that he has had Mohs surgery before  -   - no other concerns today         Objective:   Skin: Focused examination of the right lateral chest within the teledermatology photograph(s) from the biopsy was performed.   - a 2 cm pink papule on the right lateral chest    Path report:   Collection date: 5/9/24  Case: [id number]   Final Diagnosis   A(1). Skin, R lateral chest, shave:  - Squamous cell carcinoma in situ - (see description)     B(2). Skin, L forearm, shave:  - Verrucous keratosis - (see description)       Assessment and Plan:     1. Plan for Mohs micrographic surgery for skin cancer(s) above:  *Review lab result(s): Dermpath report   - We discussed the nature of  the diagnosis/condition above. We discussed the treatment options, including the risks benefits and expectations of these options. We recommend micrographic surgery as the most effective and most tissue sparing option for treatment, and the patient agrees to proceed with this.  The patient is aware of the risks, benefits and expectations of this procedure. The patient will be scheduled for this procedure, if not already done so.  - We anticipate the following closure type: Linear closure, such as complex linear closure (CLC)    The patient was discussed with and evaluated by attending physician, Brett Santos MD.    Staff Involved:   Scribe/Fellow/Staff     Scribe Disclosure:   I, CHRISTA FLORES, am serving as a scribe; to document services personally performed by Brett Santos MD -based on data collection and the provider's statements to me.    Attending Attestation  I attest that the Scribe recorded the interview and exam that I personally performed.  I have reviewed the note and edited it as necessary.    Brett Santos M.D.  Professor  Director of Dermatologic Surgery  Department of Dermatology  [facility]

## 2024-08-05 ENCOUNTER — OFFICE VISIT (OUTPATIENT)
Dept: DERMATOLOGY | Facility: CLINIC | Age: 64
End: 2024-08-05
Payer: COMMERCIAL

## 2024-08-05 ENCOUNTER — TELEPHONE (OUTPATIENT)
Dept: DERMATOLOGY | Facility: CLINIC | Age: 64
End: 2024-08-05

## 2024-08-05 VITALS — HEART RATE: 72 BPM | DIASTOLIC BLOOD PRESSURE: 90 MMHG | SYSTOLIC BLOOD PRESSURE: 130 MMHG

## 2024-08-05 DIAGNOSIS — C44.529 SQUAMOUS CELL CARCINOMA OF SKIN OF TRUNK: ICD-10-CM

## 2024-08-05 DIAGNOSIS — D04.5 SQUAMOUS CELL CARCINOMA IN SITU (SCCIS) OF SKIN OF CHEST: Primary | ICD-10-CM

## 2024-08-05 PROCEDURE — 17313 MOHS 1 STAGE T/A/L: CPT | Mod: GC | Performed by: DERMATOLOGY

## 2024-08-05 PROCEDURE — 13101 CMPLX RPR TRUNK 2.6-7.5 CM: CPT | Mod: GC | Performed by: DERMATOLOGY

## 2024-08-05 NOTE — NURSING NOTE
Chief Complaint   Patient presents with    Derm Problem     Mohs and reconstruction to right lateral chest-sccis     Shirley Jaimes RN  Dermatology Surgery  [phone]

## 2024-08-05 NOTE — PATIENT INSTRUCTIONS
Wound care    I will experience scar, bleeding, swelling, pain, crusting and redness. I may experience incomplete removal or recurrence. Risks are bleeding, bruising, swelling, infection, nerve damage, & a large wound. A second procedure may be recommended to obtain the best cosmetic or functional result.       A three month office visit with your Surgeon is recommended for scar evaluation. Please reach out sooner if you have concerns about you surgical site/wound.    Caring for your skin after surgery    After your surgery, a pressure bandage will be placed over the area that has stitches. This is important to prevent bleeding. Please follow these instructions over the next 1 to 2 weeks. Following this regimen will help to prevent complications as your wound heals.     For the first 48 hours after your surgery:    Leave the pressure dressing on and keep it dry. If it should come loose, you may re-tape it, but do not take it off.  Relax and take it easy. Do not do any vigorous exercise or heavy lifting. This could cause the wound to bleed.  Post-Operative pain is usually mild. If you are able to take tylenol, You may take plain or extra-strength Tylenol (acetaminophen) As directed on the bottle (do not take more than 4,000mg in one day). If you are able to take ibuprofen, you can alternate the tylenol and ibuprofen.   Avoid alcohol as this may increase your tendency to bleed.   You may put an ice pack around the bandaged area for 20 minutes at a time as needed. This may help reduce swelling, bruising, and pain. Make sure the ice pack is waterproof so that the pressure bandage doesn t get wet.  If the wound is on the face try to sleep with your head elevated. Either in a recliner or propped up in bed, this will decrease swelling around the eyes.   You may see a small amount of drainage or blood on your pressure bandage. This is normal. However:  If drainage or bleeding continues or saturates the bandage, you will  "need to apply firm pressure over the bandage with a piece of gauze for 15 minutes.  If bleeding continues after applying pressure for 15 minutes, apply an ice pack to the bandaged area for 15 minutes.  If bleeding still continues, call our office or go to the nearest emergency room.    Remove pressure dressing 48 hours after surgery:    Carefully remove the pressure bandage. If it seems sticky or too difficult to get off, you may need to soak it off in the shower.  After the pressure dressing is removed, you may shower and get the wound wet. However, Do Not let the forceful stream of the shower hit the wound directly.  Follow these wound care and dressing change instructions:    You have steri strips, skin glue (purplish/clear), and tegaderm (clear film) over your incision line, you can shower.   You may allow water to run over the site. Do not soak.  Do Not rub or scrub the site.  Pat dry after the shower or bath.  Avoid topical medications, lotions, creams, ointment,or oils.  Do not use tanning lamps or expose the site to sun.   Check wound appearance daily, some swelling and redness is normal after a procedure but should go away as your would is healing. If the swelling and redness or pain increases or if any other signs of infection occur listed below please send in a photo via my chart and or call us to let us know.  The clear film should start peeling off approximately around 2 weeks. By this time your wound should be sufficiently healed. If it still looks to be healing when the glue comes off you can clean the wound with soapy warm water daily in the shower and apply Vaseline to the incision line.   Once the clear film starts peeling off to the point where water is getting trapped under the clear film, please gently peel off.        If you are able to take acetaminophen (\"Tylenol,\" etc.) and ibuprofen (\"Advil\" or \"Motrin,\" etc.), then you may STAGGER these medications by taking 400 mg of ibuprofen (usually " two tabs) every 8 hours and 1,000 mg of acetaminophen (e.g., two tabs of extra-strength Tylenol) every 8 hours.    This means, for example, that you could take the followin,000 mg of Tylenol, followed 4 hours later by 400 mg Ibuprofen, followed 4 hours later with 1,000 mg of Tylenol, followed 4 hours later by 400 mg Ibuprofen, followed 4 hours later with 1,000 mg of Tylenol, and so forth.     Essentially, you can take either 1,000 mg of Tylenol or 400 mg of ibuprofen in alternating fashion EVERY FOUR HOURS.    Do NOT exceed more than 4,000 mg of Tylenol or 3,200 mg of ibuprofen per 24 hours. If you are not able to take Tylenol or ibuprofen as above due to other health issues (or a physician has told you directly that you are not allowed to take one of them, say due to pre-existing severe liver or kidney issues), then disregard the above directions.    Scientific evidence supports that this combination/schedule of pain medications is just as effective, if not more effective, than taking a narcotic pain medicine.       Follow up will be a 3 month scar evaluation either in person or via a telephone visit with you sending in a photo via [employer]. Unless you have been told to follow up sooner or if you have concerns and would like to be see sooner. Please call or send us in a [employer] message if possible and attach a photo.        What to expect:    The first couple of days your wound may be tender and may bleed slightly when doing wound care.  There may be swelling and bruising around the wound, especially if it is near the eyes. For your comfort, you may apply ice or cold compresses to the bruises after your have removed the pressure bandage.  The area around your wound may be numb for several weeks or even months.  You may experience periodic sharp pain or mild itching around the wound as it heals.   The suture line will look dark for a while but will lighten over time.       When to call us:    You have bleeding  that will not stop after applying pressure and ice.  You have pain that is not controlled with Tylenol (acetaminophen.)  You have signs or symptoms of an infection such as:  Fever over 100 degrees Fahrenheit  Redness, warmth or foul-smelling drainage from the wound  If you have any questions, or are not sure how to take care of the wound.    Phone numbers:    During business hours (M-F 8:00-4:30 p.m.)  [facility]-  [phone] Option 1 appt. Desk and ask for the Dermatology Surgery Team  [phone] [facility] .     ---------------------------------------------------------  Evenings/Weekends/Holidays  Hospital - [phone]   TTY for hearing rciplgee-[phone]  *Ask  to page dermatologist on-call  Emergency Kyid-[phone]  TTY for hearing impaired- [phone]

## 2024-08-05 NOTE — TELEPHONE ENCOUNTER
Follow up call attempted & left voicemail following Mohs procedure with Dr. Santos.       Are you having pain?   Are you taking pain medication?   Are you applying ice?    Have you had any noticeable bleeding through the bandage?    Do you have any other concerns?        Please call [phone] if you have any questions or concerns.    Shirley Jaimes RN  Dermatology Surgery  [phone]

## 2024-08-05 NOTE — PROGRESS NOTES
[facility] Dermatologic Surgery Clinic [facility] Procedure Note    Dermatology Problem List:  1.History of kidney transplant 10/6/93  - Immunosuppression: prednisone 5mg, mycophenolate 750 mg BID (previously on cyclosporine, imuran)  2. NMSC  - SCCIS, right lateral chest, s/p bx 5/9/24, s/p MMS on 8/5/24   - BCC, right axilla s/p MMS 4/9/12  - SCC, central chest s/p excision 2009  - SCC, unspecified location s/p excision 1999  3. Seborrheic and verrucous keratoses   4. Filiform warts   - left lateral eyelid, left lateral canthus, left temple s/p cryo 3/15/17, 7/19/22  5. Acrochordons   - L anterior neck s/p cryo 10/8/19  6. L thigh epidermal inclusion cyst   7.Prurigo nodularis, left forearm  - s/p cryotherapy 11/10/20  8. AK's, s/p cryo    Date of Service:  Aug 5, 2024  Surgery: Mohs micrographic surgery    Case 1  Repair Type: Complex repair  Repair Size: 5.0 cm  Suture Material: 4-0 Monocryl  Tumor Type: SCCIS  Location: Right lateral chest  Derm-Path Accession #: [id number]   PreOp Size: 2.2 x 1.6 cm  PostOp Size: 3.0 x 2.0 cm  Mohs Accession #:   Level of Defect: Complex       Procedure:  We discussed the principles of treatment and most likely complications including scarring, bleeding, infection, swelling, pain, crusting, nerve damage, large wound,  incomplete excision, wound dehiscence,  nerve damage, recurrence, and a second procedure may be recommended to obtain the best cosmetic or functional result.    Informed consent was obtained and the patient underwent the procedure as follows:  The patient was placed supine on the operating table.  The cancer was identified, outlined with a marker, and verified by the patient.  The entire surgical field was prepped with Hibiclens.  The surgical site was anesthetized using 1% lido with epi.      The area of clinically apparent tumor was not debulked. The layer of tissue was then surgically excised using a #15 blade and was then transferred onto a  specimen sheet maintaining the orientation of the specimen. Hemostasis was obtained using hyfrecator. The wound site was then covered with a dressing while the tissue samples were processed for examination.    The excised tissue was transported to the Mohs histology laboratory maintaining the tissue orientation.  The tissue specimen was relaxed so that the entire surgical margin was in a a single horizontal plane for sectioning and inked for precise mapping.  A precise reference map was drawn to reflect the sectioning of the specimen, colored inking of the margins, and orientation on the patient. The tissue was processed using horizontal sectioning of the base and continuous peripheral margins.  The histopathologic sections were reviewed in conjunction with the reference map.    Total blocks: 1    Total slides:  3    There were no cancer cells visualized on examination, therefore Mohs surgery was complete.    Reconstruction: Complex Linear Closure    Due to one or more of the following factors, complex linear closure was required/indicated: Extensive undermining (equal to or greater than the maximum perpendicular width of the defect), exposure of underlying structure (bone, cartilage, tendon, named neurovascular), free margin involvement (helical rim, vermillion border, nostril rim), and/or placement of retention sutures.     The patient was taken to the operative suite and placed supine on the operating room table.  The defect was identified.  Appropriate markings were made with a marking pen to plan the repair.  The area was infiltrated with Lidocaine 1% with epi 1:100,000 and prepped with Hibiclens and draped with sterile towels.     The wound was debeveled and undermined widely.  Cones were excised within relaxed skin tension lines on both sides of the defect.  Hemostasis was obtained using electrocoagulation. A fascial plication suture using 4-0 Monocryl suture material was placed to narrow the primary defect.  Subcutaneous tissues were then approximated using 4-0 Monocryl buried vertical mattress sutures.  The wound edges were then approximated additional 4-0 Monocryl buried sutures were placed in a similar fashion where needed.  4-0 Monocryl running subcuticular sutures were carefully placed for maximum eversion and meticulous approximation.      Repair Size: 5.0 cm    The wound was cleansed with saline and ointment was applied along the wound surface.     A sterile pressure dressing was applied.  Wound care instructions were given verbally and in writing.  The patient left the operating suite in stable condition.  Patient was informed that additional refinement of the resulting surgical scar may be used as a second stage of this reconstruction.     The attending surgeon was present for the entire procedure and always immediately available.    Staff Involved:   Scribe/Resident/Staff     Scribe Disclosure:   ICHRISTA, am serving as a scribe; to document services personally performed by Brett Santos MD -based on data collection and the provider's statements to me.    Tigist Okeefe DO, MS  PGY-3  Dermatology  [facility]  08/05/2024 1:27 PM      Attending attestation:  I was present for key elements of the procedure and immediately available for all other portions of the procedure.  I have reviewed the note and edited it as necessary.    Brett Santos M.D.  Professor  Director of Dermatologic Surgery  Department of Dermatology  [facility]    Dermatology Surgery Clinic  [facility]  [address] [place] [id number]

## 2024-08-05 NOTE — LETTER
8/5/2024       RE: Jerad Ross  [address] [place] [address]     Dear Colleague,    Thank you for referring your patient, Jerad Ross, to the [facility]MATOLOG[facility] [place] [facility]. Please see a copy of my visit note below.    [facility] Dermatologic Surgery Clinic [place] Procedure Note    Dermatology Problem List:  1.History of kidney transplant 10/6/93  - Immunosuppression: prednisone 5mg, mycophenolate 750 mg BID (previously on cyclosporine, imuran)  2. NMSC  - SCCIS, right lateral chest, s/p bx 5/9/24, s/p MMS on 8/5/24   - BCC, right axilla s/p MMS 4/9/12  - SCC, central chest s/p excision 2009  - SCC, unspecified location s/p excision 1999  3. Seborrheic and verrucous keratoses   4. Filiform warts   - left lateral eyelid, left lateral canthus, left temple s/p cryo 3/15/17, 7/19/22  5. Acrochordons   - L anterior neck s/p cryo 10/8/19  6. L thigh epidermal inclusion cyst   7.Prurigo nodularis, left forearm  - s/p cryotherapy 11/10/20  8. AK's, s/p cryo    Date of Service:  Aug 5, 2024  Surgery: Mohs micrographic surgery    Case 1  Repair Type: Complex repair  Repair Size: 5.0 cm  Suture Material: 4-0 Monocryl  Tumor Type: SCCIS  Location: Right lateral chest  Derm-Path Accession #: [id number]   PreOp Size: 2.2 x 1.6 cm  PostOp Size: 3.0 x 2.0 cm  Mohs Accession #:   Level of Defect: Complex       Procedure:  We discussed the principles of treatment and most likely complications including scarring, bleeding, infection, swelling, pain, crusting, nerve damage, large wound,  incomplete excision, wound dehiscence,  nerve damage, recurrence, and a second procedure may be recommended to obtain the best cosmetic or functional result.    Informed consent was obtained and the patient underwent the procedure as follows:  The patient was placed supine on the operating table.  The cancer was identified, outlined  with a marker, and verified by the patient.  The entire surgical field was prepped with Hibiclens.  The surgical site was anesthetized using 1% lido with epi.      The area of clinically apparent tumor was not debulked. The layer of tissue was then surgically excised using a #15 blade and was then transferred onto a specimen sheet maintaining the orientation of the specimen. Hemostasis was obtained using hyfrecator. The wound site was then covered with a dressing while the tissue samples were processed for examination.    The excised tissue was transported to the Mohs histology laboratory maintaining the tissue orientation.  The tissue specimen was relaxed so that the entire surgical margin was in a a single horizontal plane for sectioning and inked for precise mapping.  A precise reference map was drawn to reflect the sectioning of the specimen, colored inking of the margins, and orientation on the patient. The tissue was processed using horizontal sectioning of the base and continuous peripheral margins.  The histopathologic sections were reviewed in conjunction with the reference map.    Total blocks: 1    Total slides:  3    There were no cancer cells visualized on examination, therefore Mohs surgery was complete.    Reconstruction: Complex Linear Closure    Due to one or more of the following factors, complex linear closure was required/indicated: Extensive undermining (equal to or greater than the maximum perpendicular width of the defect), exposure of underlying structure (bone, cartilage, tendon, named neurovascular), free margin involvement (helical rim, vermillion border, nostril rim), and/or placement of retention sutures.     The patient was taken to the operative suite and placed supine on the operating room table.  The defect was identified.  Appropriate markings were made with a marking pen to plan the repair.  The area was infiltrated with Lidocaine 1% with epi 1:100,000 and prepped with Hibiclens  and draped with sterile towels.     The wound was debeveled and undermined widely.  Cones were excised within relaxed skin tension lines on both sides of the defect.  Hemostasis was obtained using electrocoagulation. A fascial plication suture using 4-0 Monocryl suture material was placed to narrow the primary defect. Subcutaneous tissues were then approximated using 4-0 Monocryl buried vertical mattress sutures.  The wound edges were then approximated additional 4-0 Monocryl buried sutures were placed in a similar fashion where needed.  4-0 Monocryl running subcuticular sutures were carefully placed for maximum eversion and meticulous approximation.      Repair Size: 5.0 cm    The wound was cleansed with saline and ointment was applied along the wound surface.     A sterile pressure dressing was applied.  Wound care instructions were given verbally and in writing.  The patient left the operating suite in stable condition.  Patient was informed that additional refinement of the resulting surgical scar may be used as a second stage of this reconstruction.     The attending surgeon was present for the entire procedure and always immediately available.    Staff Involved:   Scribe/Resident/Staff     Scribe Disclosure:   CHRISTA GUEVARA, am serving as a scribe; to document services personally performed by Brett Santos MD -based on data collection and the provider's statements to me.    Tigist Okeefe DO, MS  PGY-3  Dermatology  [facility]  08/05/2024 1:27 PM      Attending attestation:  I was present for key elements of the procedure and immediately available for all other portions of the procedure.  I have reviewed the note and edited it as necessary.    Brett Santos M.D.  Professor  Director of Dermatologic Surgery  Department of Dermatology  [facility]    Dermatology Surgery Clinic  [facility] [place] [phone]          Again, thank you for allowing me to participate in the care of your patient.      Sincerely,    Brett Santos MD

## 2024-08-13 ENCOUNTER — TRANSFERRED RECORDS (OUTPATIENT)
Dept: HEALTH INFORMATION MANAGEMENT | Facility: CLINIC | Age: 64
End: 2024-08-13
Payer: COMMERCIAL

## 2024-08-29 ENCOUNTER — VIRTUAL VISIT (OUTPATIENT)
Dept: PSYCHIATRY | Facility: CLINIC | Age: 64
End: 2024-08-29
Attending: NURSE PRACTITIONER
Payer: COMMERCIAL

## 2024-08-29 DIAGNOSIS — F41.1 GENERALIZED ANXIETY DISORDER: Chronic | ICD-10-CM

## 2024-08-29 DIAGNOSIS — F33.41 RECURRENT MAJOR DEPRESSIVE DISORDER, IN PARTIAL REMISSION (H): ICD-10-CM

## 2024-08-29 DIAGNOSIS — G47.00 INSOMNIA, UNSPECIFIED TYPE: ICD-10-CM

## 2024-08-29 PROCEDURE — 99213 OFFICE O/P EST LOW 20 MIN: CPT | Mod: 95 | Performed by: NURSE PRACTITIONER

## 2024-08-29 RX ORDER — FLUOXETINE 40 MG/1
40 CAPSULE ORAL DAILY
Qty: 90 CAPSULE | Refills: 1 | Status: SHIPPED | OUTPATIENT
Start: 2024-08-29

## 2024-08-29 ASSESSMENT — PATIENT HEALTH QUESTIONNAIRE - PHQ9
SUM OF ALL RESPONSES TO PHQ QUESTIONS 1-9: 7
SUM OF ALL RESPONSES TO PHQ QUESTIONS 1-9: 7
10. IF YOU CHECKED OFF ANY PROBLEMS, HOW DIFFICULT HAVE THESE PROBLEMS MADE IT FOR YOU TO DO YOUR WORK, TAKE CARE OF THINGS AT HOME, OR GET ALONG WITH OTHER PEOPLE: SOMEWHAT DIFFICULT

## 2024-08-29 ASSESSMENT — ANXIETY QUESTIONNAIRES
GAD7 TOTAL SCORE: 9
7. FEELING AFRAID AS IF SOMETHING AWFUL MIGHT HAPPEN: MORE THAN HALF THE DAYS
GAD7 TOTAL SCORE: 9
GAD7 TOTAL SCORE: 9
5. BEING SO RESTLESS THAT IT IS HARD TO SIT STILL: SEVERAL DAYS
8. IF YOU CHECKED OFF ANY PROBLEMS, HOW DIFFICULT HAVE THESE MADE IT FOR YOU TO DO YOUR WORK, TAKE CARE OF THINGS AT HOME, OR GET ALONG WITH OTHER PEOPLE?: SOMEWHAT DIFFICULT
1. FEELING NERVOUS, ANXIOUS, OR ON EDGE: MORE THAN HALF THE DAYS
3. WORRYING TOO MUCH ABOUT DIFFERENT THINGS: SEVERAL DAYS
6. BECOMING EASILY ANNOYED OR IRRITABLE: NOT AT ALL
7. FEELING AFRAID AS IF SOMETHING AWFUL MIGHT HAPPEN: MORE THAN HALF THE DAYS
2. NOT BEING ABLE TO STOP OR CONTROL WORRYING: SEVERAL DAYS
IF YOU CHECKED OFF ANY PROBLEMS ON THIS QUESTIONNAIRE, HOW DIFFICULT HAVE THESE PROBLEMS MADE IT FOR YOU TO DO YOUR WORK, TAKE CARE OF THINGS AT HOME, OR GET ALONG WITH OTHER PEOPLE: SOMEWHAT DIFFICULT
4. TROUBLE RELAXING: MORE THAN HALF THE DAYS

## 2024-08-29 ASSESSMENT — PAIN SCALES - GENERAL: PAINLEVEL: NO PAIN (0)
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
## 2024-08-29 NOTE — PATIENT INSTRUCTIONS
**For crisis resources, please see the information at the end of this document**   Patient Education    Thank you for coming to the [facility] [place] [facility].     Lab Testing:  If you had lab testing today and your results are reassuring or normal they will be mailed to you or sent through Wordlock within 7 days. If the lab tests need quick action we will call you with the results. The phone number we will call with results is # [phone]. If this is not the best number please call our clinic and change the number.     Medication Refills:  If you need any refills please call your pharmacy and they will contact us. Our fax number for refills is [phone].   Three business days of notice are needed for general medication refill requests.   Five business days of notice are needed for controlled substance refill requests.   If you need to change to a different pharmacy, please contact the new pharmacy directly. The new pharmacy will help you get your medications transferred.     Contact Us:  Please call [phone] during business hours (8-5:00 M-F).   If you have medication related questions after clinic hours, or on the weekend, please call [phone].     Financial Assistance [phone]   Medical Records [phone]       MENTAL HEALTH CRISIS RESOURCES:  For a emergency help, please call 911 or go to the nearest Emergency Department.     Emergency Walk-In Options:   [facility] ([place]): [phone] - Specialized mental health emergency area designed to be calming  [facility] ([place]): [phone]  [facility] ([place]): [phone]  [facility]): [phone]    [facility] Crisis Information:   [place]: [phone]  [place]: [phone]  [place] ([place]) - Adult: [phone]     Child: [phone]  [place] - Adult: [phone]     Child: [phone]  [place]:  [phone]  List of all [facility] resources:   https://[place].gov/dhs/people-we-serve/adults/health-care/mental-health/resources/crisis-contacts.jsp    National Crisis Information:   Crisis Text Line: Text  [place]  to 322290  Suicide & Crisis Lifeline: 988  National Suicide Prevention Lifeline: 0-235-999-TALK (1-400.330.1898)       For online chat options, visit https://suicidepreventionlifeline.org/chat/  Poison Control Center: 6-895-154-5042  Trans Lifeline: 8-890-509-7309 - Hotline for transgender people of all ages  The [employer]: 9-906-312-0517 - Hotline for LGBT youth     For Non-Emergency Support:   Fast Tracker: Mental Health & Substance Use Disorder Resources -   https://www.[employer]ckPhotoblogn.org/

## 2024-09-05 ENCOUNTER — TELEPHONE (OUTPATIENT)
Dept: TRANSPLANT | Facility: CLINIC | Age: 64
End: 2024-09-05
Payer: COMMERCIAL

## 2024-09-05 NOTE — TELEPHONE ENCOUNTER
Health Call Center    Phone Message    May a detailed message be left on voicemail: yes     Reason for Call: Other: Patient has some questions regarding his bills. He is receiving large invoices from MA and wanting assistance in clearing that. He has tried to reach out to MA but no one has called him back. Wondering if he could see a SW to assist with this. Please contact patient to advise.      Action Taken: Message routed to:  [facility]  SOT    Travel Screening: Not Applicable     Date of Service:

## 2024-09-16 ENCOUNTER — TELEPHONE (OUTPATIENT)
Dept: CARDIOLOGY | Facility: CLINIC | Age: 64
End: 2024-09-16
Payer: COMMERCIAL

## 2024-09-16 VITALS — SYSTOLIC BLOOD PRESSURE: 118 MMHG | DIASTOLIC BLOOD PRESSURE: 72 MMHG

## 2024-09-16 NOTE — TELEPHONE ENCOUNTER
Called patient to review recent elevated blood pressure reading.  Per [place] Community Measures guidelines, patients blood pressure is out of parameters and recheck blood pressure is recommended.    Last Blood Pressure: 130/90  Last Heart Rate: 72  Date: 08/05/24  Location: Other Specialty    Today's Blood Pressure: 118/72  Location: Other Specialty    Patient reported blood pressure updated in Epic. Blood pressure falls within [place] Community Measures guidelines.  Patient will follow up as previously advised.

## 2024-10-03 DIAGNOSIS — Z94.0 KIDNEY TRANSPLANTED: ICD-10-CM

## 2024-10-03 RX ORDER — MYCOPHENOLATE MOFETIL 250 MG/1
750 CAPSULE ORAL 2 TIMES DAILY
Qty: 540 CAPSULE | Refills: 0 | Status: SHIPPED | OUTPATIENT
Start: 2024-10-03

## 2024-10-07 DIAGNOSIS — K21.9 GASTROESOPHAGEAL REFLUX DISEASE WITHOUT ESOPHAGITIS: ICD-10-CM

## 2024-10-07 DIAGNOSIS — F10.20 ALCOHOL USE DISORDER, SEVERE, DEPENDENCE (H): ICD-10-CM

## 2024-10-07 RX ORDER — NALTREXONE HYDROCHLORIDE 50 MG/1
50 TABLET, FILM COATED ORAL DAILY
Qty: 30 TABLET | Refills: 0 | Status: SHIPPED | OUTPATIENT
Start: 2024-10-07 | End: 2024-10-21

## 2024-10-07 NOTE — TELEPHONE ENCOUNTER
One month refill provided.  Upcoming visit with Sarahy gallagher.    Ale Esteban,  on 10/7/2024 at 3:24 PM

## 2024-10-07 NOTE — TELEPHONE ENCOUNTER
Date of Last Office Visit: 7/22/24  Date of Next Office Visit:  10/21/24  No shows since last visit: No  More than one patient-initiated cancellation (with reschedule) since last seen in clinic? No    []Medication refilled per  Medication Refill in Ambulatory Care  policy.  [x]Medication unable to be refilled by RN due to criteria not met as indicated below:    []Eligibility: has not had a provider visit within last 6 months   []Supervision: no future appointment; < 7 days before next appointment   []Compliance: no shows; cancellations; lapse in therapy   []Verification: order discrepancy; may need modification...   [x] > 30-day supply request   []Advanced refill request: > 7 days before refill date   []Controlled medication   []Medication not included in policy   []Review: new med; med adjusted ? 30 days; safety alert; requires lab monitoring...   []Scope of Practice: refill request processed by LPN/MA   []Other:      Medication(s) requested:     -  naltrexone (DEPADE/REVIA) 50 MG tablet   Date last ordered: 4/15/24  Qty: 90  Refills: 1  Appropriate for refill? Yes    Any Controlled Substance(s)? No      Requested medication(s) verified as identical to current order? Yes    Any lapse in adherence to medication(s) greater than 5 days? No      Additional action taken? cued up medication/order(s).      Last visit treatment plan:   1. Alcohol use disorder, severe, dependence (H)  Stable, denies cravings or return to use, last drink remains 8/5/2023.   Is taking naltrexone 50 mg daily, continue. No refill needed today.   Continue AA attendance and working with sponsor.      2. Generalized anxiety disorder  3. Insomnia, unspecified type  Overall is stable. Is taking hydroxyzine and prozac prescribed by psychiatry. Did experience an episode of increased anxiety and took 25 mg during daytime hours, is prescribed 10 mg during daytime and 25 mg at bedtime. Discussed with his sponsor who has recommended that he change his  sober date.   Encouraged to schedule follow up appointment with his psych provider.      No orders of the defined types were placed in this encounter.  Return in about 13 weeks (around 10/21/2024) for Follow up, with me, using a video visit 1100 am     Any medication(s) require lab monitoring? No

## 2024-10-08 ENCOUNTER — TELEPHONE (OUTPATIENT)
Dept: OPHTHALMOLOGY | Facility: CLINIC | Age: 64
End: 2024-10-08
Payer: COMMERCIAL

## 2024-10-08 NOTE — TELEPHONE ENCOUNTER
[facility]    Phone Message    May a detailed message be left on voicemail: yes     Reason for Call: Appointment Intake    Referring Provider Name: n/a  Diagnosis and/or Symptoms: Wave/Hair like floater, left eye    Per protocol, writer to send TE for floaters if no same day appt.    Action Taken: Message routed to:  [facility] eye    Travel Screening: Not Applicable     Date of Service:

## 2024-10-08 NOTE — TELEPHONE ENCOUNTER
Returned call to patient.     Patient reporting starting today he is seeing 1 string like floater in L eye. Patient report it comes in an out of vision.     Patient denies any other vision changes, blurred vision, flashes of light, redness, swelling, light sensitivity or changes to right eye.     Patient scheduled on 10/9/24 with [place] Bella [place] [facility] appointment time 1:20 PM, arrival time of 1:05 PM. Patient aware of date/time/location information for appointment. Patient confirmed appointment time and all questions were answered.     Educated patient to return call to clinic or be seen in [facility] room if there are any changes to vision prior to appointment tomorrow such as loss of vision, curtain in vision or increase in floaters/flashes of light.     Patient verbalized understanding and in agreement with plan.     Elizabeth Stone RN 1:21 PM 10/08/24

## 2024-10-09 ENCOUNTER — OFFICE VISIT (OUTPATIENT)
Dept: OPHTHALMOLOGY | Facility: CLINIC | Age: 64
End: 2024-10-09
Payer: COMMERCIAL

## 2024-10-09 DIAGNOSIS — H43.812 POSTERIOR VITREOUS DETACHMENT, LEFT: Primary | ICD-10-CM

## 2024-10-09 PROCEDURE — 92014 COMPRE OPH EXAM EST PT 1/>: CPT | Performed by: OPTOMETRIST

## 2024-10-09 RX ORDER — PANTOPRAZOLE SODIUM 40 MG/1
40 TABLET, DELAYED RELEASE ORAL DAILY
Qty: 90 TABLET | Refills: 0 | Status: SHIPPED | OUTPATIENT
Start: 2024-10-09

## 2024-10-09 ASSESSMENT — VISUAL ACUITY
METHOD: SNELLEN - LINEAR
CORRECTION_TYPE: GLASSES
OS_PH_CC+: -1
OD_CC+: -1
OS_PH_CC: 20/30
OS_CC+: +2
OD_CC: 20/125
OS_CC: 20/40

## 2024-10-09 ASSESSMENT — CONF VISUAL FIELD
OS_INFERIOR_TEMPORAL_RESTRICTION: 0
OS_INFERIOR_NASAL_RESTRICTION: 0
OS_NORMAL: 1
OS_SUPERIOR_NASAL_RESTRICTION: 0
OD_INFERIOR_NASAL_RESTRICTION: 3
OD_INFERIOR_TEMPORAL_RESTRICTION: 1
METHOD: COUNTING FINGERS
OS_SUPERIOR_TEMPORAL_RESTRICTION: 0

## 2024-10-09 ASSESSMENT — TONOMETRY
OD_IOP_MMHG: 23
IOP_METHOD: ICARE
OS_IOP_MMHG: 19

## 2024-10-09 ASSESSMENT — REFRACTION_WEARINGRX
OS_ADD: +2.50
OS_SPHERE: -1.00
OS_AXIS: 168
OD_SPHERE: BALANCE
OS_CYLINDER: +2.75

## 2024-10-09 ASSESSMENT — CUP TO DISC RATIO
OD_RATIO: 0.5
OS_RATIO: 0.65

## 2024-10-09 ASSESSMENT — SLIT LAMP EXAM - LIDS
COMMENTS: NORMAL
COMMENTS: NORMAL

## 2024-10-09 ASSESSMENT — EXTERNAL EXAM - LEFT EYE: OS_EXAM: NORMAL

## 2024-10-09 ASSESSMENT — EXTERNAL EXAM - RIGHT EYE: OD_EXAM: NORMAL

## 2024-10-09 NOTE — NURSING NOTE
Chief Complaints and History of Present Illnesses   Patient presents with    Floaters Left Eye     Chief Complaint(s) and History of Present Illness(es)       Floaters Left Eye              Laterality: left eye    Duration: 1 day    Associated symptoms: Negative for flashes and shade              Comments    Yesterday began having new floater in his left eye like sea serpent.  Was looking up and it came on.  It has became more rigid like and gray.  Denies associated flashes    Mary Mcclendon on 10/9/2024 at 1:14 PM

## 2024-10-09 NOTE — PROGRESS NOTES
Assessment/Plan  (H43.812) Posterior vitreous detachment, left  (primary encounter diagnosis)  Comment: New stringy floater left eye x 1 day   Plan: Discussed findings with patient. Reassured patient that floater should become less bothersome with time. Return to clinic with any increase in flashes, floaters, or curtain over vision. Plan on monitoring for changes at regular annual eye exam.       Complete documentation of historical and exam elements from today's encounter can  be found in the full encounter summary report (not reduplicated in this progress  note). I personally obtained the chief complaint(s) and history of present illness. I  confirmed and edited as necessary the review of systems, past medical/surgical  history, family history, social history, and examination findings as documented by  others; and I examined the patient myself. I personally reviewed the relevant tests,  images, and reports as documented above. I formulated and edited as necessary the  assessment and plan and discussed the findings and management plan with the  patient and family.    Kenneth Bella, OD

## 2024-10-10 ENCOUNTER — OFFICE VISIT (OUTPATIENT)
Dept: TRANSPLANT | Facility: CLINIC | Age: 64
End: 2024-10-10
Attending: INTERNAL MEDICINE
Payer: COMMERCIAL

## 2024-10-10 ENCOUNTER — LAB (OUTPATIENT)
Dept: LAB | Facility: CLINIC | Age: 64
End: 2024-10-10
Payer: COMMERCIAL

## 2024-10-10 VITALS
HEART RATE: 61 BPM | OXYGEN SATURATION: 97 % | DIASTOLIC BLOOD PRESSURE: 83 MMHG | TEMPERATURE: 97.7 F | SYSTOLIC BLOOD PRESSURE: 124 MMHG | WEIGHT: 171 LBS | BODY MASS INDEX: 26.01 KG/M2

## 2024-10-10 DIAGNOSIS — Z94.0 KIDNEY TRANSPLANTED: Primary | ICD-10-CM

## 2024-10-10 DIAGNOSIS — Z23 NEED FOR VACCINATION: ICD-10-CM

## 2024-10-10 DIAGNOSIS — Z48.298 AFTERCARE FOLLOWING ORGAN TRANSPLANT: ICD-10-CM

## 2024-10-10 DIAGNOSIS — Z94.0 KIDNEY TRANSPLANTED: ICD-10-CM

## 2024-10-10 PROBLEM — F10.11 ALCOHOL ABUSE, IN REMISSION: Status: ACTIVE | Noted: 2023-08-14

## 2024-10-10 PROBLEM — R53.1 GENERAL WEAKNESS: Status: RESOLVED | Noted: 2023-08-30 | Resolved: 2024-10-10

## 2024-10-10 PROBLEM — R26.2 UNABLE TO AMBULATE: Status: RESOLVED | Noted: 2023-08-30 | Resolved: 2024-10-10

## 2024-10-10 PROBLEM — Z01.818 PREOPERATIVE EXAMINATION: Status: RESOLVED | Noted: 2023-06-15 | Resolved: 2024-10-10

## 2024-10-10 PROBLEM — Z74.09 IMPAIRED MOBILITY: Status: RESOLVED | Noted: 2018-11-04 | Resolved: 2024-10-10

## 2024-10-10 PROBLEM — E87.6 HYPOKALEMIA: Status: RESOLVED | Noted: 2023-08-11 | Resolved: 2024-10-10

## 2024-10-10 LAB
ALBUMIN MFR UR ELPH: 10.6 MG/DL
ANION GAP SERPL CALCULATED.3IONS-SCNC: 8 MMOL/L (ref 7–15)
BUN SERPL-MCNC: 14.7 MG/DL (ref 8–23)
CALCIUM SERPL-MCNC: 9.9 MG/DL (ref 8.8–10.4)
CHLORIDE SERPL-SCNC: 103 MMOL/L (ref 98–107)
CREAT SERPL-MCNC: 0.82 MG/DL (ref 0.67–1.17)
CREAT UR-MCNC: 77.6 MG/DL
EGFRCR SERPLBLD CKD-EPI 2021: >90 ML/MIN/1.73M2
ERYTHROCYTE [DISTWIDTH] IN BLOOD BY AUTOMATED COUNT: 16.5 % (ref 10–15)
GLUCOSE SERPL-MCNC: 92 MG/DL (ref 70–99)
HCO3 SERPL-SCNC: 29 MMOL/L (ref 22–29)
HCT VFR BLD AUTO: 43.4 % (ref 40–53)
HGB BLD-MCNC: 13.3 G/DL (ref 13.3–17.7)
MCH RBC QN AUTO: 27.4 PG (ref 26.5–33)
MCHC RBC AUTO-ENTMCNC: 30.6 G/DL (ref 31.5–36.5)
MCV RBC AUTO: 89 FL (ref 78–100)
PLATELET # BLD AUTO: 275 10E3/UL (ref 150–450)
POTASSIUM SERPL-SCNC: 4.7 MMOL/L (ref 3.4–5.3)
PROT/CREAT 24H UR: 0.14 MG/MG CR (ref 0–0.2)
RBC # BLD AUTO: 4.86 10E6/UL (ref 4.4–5.9)
SODIUM SERPL-SCNC: 140 MMOL/L (ref 135–145)
WBC # BLD AUTO: 7.2 10E3/UL (ref 4–11)

## 2024-10-10 PROCEDURE — 250N000011 HC RX IP 250 OP 636: Performed by: NURSE PRACTITIONER

## 2024-10-10 PROCEDURE — 85027 COMPLETE CBC AUTOMATED: CPT | Performed by: PATHOLOGY

## 2024-10-10 PROCEDURE — 36415 COLL VENOUS BLD VENIPUNCTURE: CPT | Performed by: PATHOLOGY

## 2024-10-10 PROCEDURE — 91320 SARSCV2 VAC 30MCG TRS-SUC IM: CPT | Performed by: NURSE PRACTITIONER

## 2024-10-10 PROCEDURE — G0008 ADMIN INFLUENZA VIRUS VAC: HCPCS | Performed by: NURSE PRACTITIONER

## 2024-10-10 PROCEDURE — 90673 RIV3 VACCINE NO PRESERV IM: CPT | Performed by: NURSE PRACTITIONER

## 2024-10-10 PROCEDURE — 84156 ASSAY OF PROTEIN URINE: CPT | Performed by: PATHOLOGY

## 2024-10-10 PROCEDURE — 80048 BASIC METABOLIC PNL TOTAL CA: CPT | Performed by: PATHOLOGY

## 2024-10-10 PROCEDURE — 99214 OFFICE O/P EST MOD 30 MIN: CPT | Performed by: NURSE PRACTITIONER

## 2024-10-10 PROCEDURE — G0463 HOSPITAL OUTPT CLINIC VISIT: HCPCS | Performed by: NURSE PRACTITIONER

## 2024-10-10 PROCEDURE — 90480 ADMN SARSCOV2 VAC 1/ONLY CMP: CPT | Performed by: NURSE PRACTITIONER

## 2024-10-10 RX ADMIN — COVID-19 VACCINE, MRNA 30 MCG: 0.04 INJECTION, SUSPENSION INTRAMUSCULAR at 13:28

## 2024-10-10 RX ADMIN — INFLUENZA A VIRUS A/WEST VIRGINIA/30/2022 (H1N1) RECOMBINANT HEMAGGLUTININ ANTIGEN, INFLUENZA A VIRUS A/MASSACHUSETTS/18/2022 (H3N2) RECOMBINANT HEMAGGLUTININ ANTIGEN, AND INFLUENZA B VIRUS B/AUSTRIA/1359417/2021 RECOMBINANT HEMAGGLUTININ ANTIGEN 0.5 ML: 45; 45; 45 INJECTION INTRAMUSCULAR at 13:31

## 2024-10-10 ASSESSMENT — PAIN SCALES - GENERAL: PAINLEVEL: NO PAIN (0)

## 2024-10-10 NOTE — PROGRESS NOTES
TRANSPLANT NEPHROLOGY CHRONIC POST TRANSPLANT VISIT    Assessment & Plan   # DDKT: Stable    - Baseline Creatinine:  ~ 0.7-0.9   - Proteinuria: Minimal (0.2-0.5 grams)   - Date DSA Last Checked: Not Known      Latest DSA: Not checked recently due to time from transplant   - BK Viremia: Not checked recently due to time from transplant   - Kidney Tx Biopsy: No    # Immunosuppression: Mycophenolate mofetil (dose 750 mg every 12 hours) and Prednisone (dose 5 mg daily)   - Continue with intensive monitoring of immunosuppression for efficacy and toxicity.   - Changes: No    # Infection Prophylaxis:   Last CD4 Level: Not checked  - PJP: None    # Hypertension: Borderline control;  Goal BP: < 130/80   - Changes: No , not at this time. Continue IMDUR 30 mg daily. Encouraged he start checking BPs at home more frequently.      BPs: not checking at home.     # Anemia in Chronic Renal Disease: Hgb: Stable, near normal      CAROL ANN: No   - Iron studies: Not checked recently    # Mineral Bone Disorder:   - Vitamin D; level: Normal        On supplement: Yes  - Calcium; level: Normal        On supplement: Yes    # CAD, s/p CABG: Last cardiac stress test was abnormal in 6/2023 (but unchanged from prior testing in 2022).  Coronary angiogram 9/2021 that showed some possible obstructive disease in the 1st diagonal, but unable to stent it.  Bypass grafts were open.  Plan is for medical management.  Patient is followed closely by Cardiology. Last seen by Dr. Lundberg in 5/2024. Denies symptoms at this time.      # Provoked PE (8/2023): after right hip revision surgery. Completed AC.     # Chronic Hepatitis B: Stable on entecavir. Follows here with hepatology.     # Asthma: Some increase in shortness of breath, but felt more cardiac in origin.  Patient is stable on inhalers.    # GERD: Asymptomatic on pantoprazole, but with h/o ulcer, will continue on medication.    # Skin Cancer: New lesions: none , followed by derm twice yearly.    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    # Medical Compliance: Yes    # COVID-19 Virus Review: Discussed COVID-19 virus and the potential medical risks.  Reviewed preventative health recommendations, including wearing a mask where appropriate.  Recommended COVID vaccination should be up to date with either an initial vaccination or booster shot when appropriate.  Asked the patient to inform the transplant center if they are exposed or diagnosed with this virus.    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  Significant transplant-related complications: None    Transplant Office Phone Number: [phone]    Assessment and plan was discussed with the patient and he voiced his understanding and agreement.    Return visit: No follow-ups on file.    RONALDO Baumann CNP    Chief Complaint   Mr. Ross is a 64 year old here for kidney transplant and immunosuppression management.    History of Present Illness   Since he was last seen by this writer in 4/2023, he has had several ED visit / hosptializations including :    - 4/2023 ED visit for  bilateral hip pain likely from AVN, encouraged to f/unit(s) outpatient     - 6/13/2023: admitted over night with chest pain. Completed work up including stress test that was abnormal (but unchanged form prior study in 9/2022).     - 6/19/2023: underwent right total hip revision.     - 7/2023 ED visit for anxiety exacerbation after discontinuing anxiety medications.     - 8/11/2023: ED visit for dizziness / hypotension with BPs of 80s/60s.     - 8/20 -8/22/2023 admit for PE. Started on DOAC. Stable EF on ECHO. Of note,  was monitor for alcohol withdrawal (quit drinking on 8/5/2023). Cirrhosis seen on CT imaging, however, he later had a fibrosis scan on 5/14/2024  that showed Fibrosis stage 0-1.     - 9/1/2023: admitted with generalized weakness/low grade fevers. Procalcitonin mildly up. Blood cx negative. Treated with empiric meropenem X 48 hours.     He reports he has remained sober of alcohol since 8/5/2024 after he went to outpatient treatment. He is feeling much better emotionally and physically since three months ago. Denies nausea, vomiting and poor appetite. Denies fevers, chills and sweats. Has not been checking BPs at home, but is well controlled in clinic today. No LE swelling. No pain with urination or trouble starting stream/emptying bladder. Additionally, he underwent MMS in 8/5/2024 for SCCIS, right lateral chest. Will follow up with derm in 6 months.             Home BP: Not checked    Problem List   Patient Active Problem List   Diagnosis    BCC (basal cell carcinoma), trunk    JULIANE (obstructive sleep apnea)    HTN, kidney transplant related    Coronary arteriosclerosis due to lipid rich plaque    NSTEMI (non-ST elevated myocardial infarction) (H)    Depression    Diverticulosis    Hemorrhoids    Kidney replaced by transplant    Basal cell carcinoma    Squamous cell carcinoma    Dyslipidemia    CRP elevated    Glaucoma    AION (acute ischemic optic neuropathy)    Paracentral scotoma    Hip pain    Inflamed seborrheic keratosis    Intertrigo    Chronic hepatitis B (H)    Immunosuppression (H)    Hypogonadism in male    GERD (gastroesophageal reflux disease)    Aftercare following organ transplant    Acute midline low back pain without sciatica    Septic bursitis    Impaired mobility    CAD -- S/P CABG x 3 in 2020    HTN (hypertension)    End stage renal disease (H)    Hip pain, right    Avascular necrosis of bones of both hips (H)    Chest pain, Probably chest wall in origin    Preoperative examination    Coronary  artery disease of native artery of native heart with stable angina pectoris (H)    Failed total hip arthroplasty, sequela    Generalized muscle weakness    Generalized anxiety disorder    Transient hypotension    Hypokalemia    Alcohol use disorder, severe, dependence (H)    Hypoxia    Multiple subsegmental pulmonary emboli without acute cor pulmonale (H)    Hypomagnesemia    General weakness    Unable to ambulate    Nonadherence to medication    Pure hypercholesterolemia       Allergies   Allergies   Allergen Reactions    Penicillins Shortness Of Breath and Hives    Keflex [Cephalexin Hcl] Unknown     Patient could not recall reaction    Sulfa Antibiotics Rash    Tetracycline Unknown     Patient could not recall reaction       Medications   Current Outpatient Medications   Medication Sig Dispense Refill    acetaminophen (TYLENOL) 500 MG tablet Take 1,000 mg by mouth 3 times daily as needed for mild pain      albuterol (PROAIR HFA) 108 (90 Base) MCG/ACT inhaler Inhale 2 puffs into the lungs every 6 hours as needed for shortness of breath / dyspnea or wheezing 1 g 11    aspirin 81 MG EC tablet Take 1 tablet (81 mg) by mouth daily      budesonide (PULMICORT FLEXHALER) 90 MCG/ACT inhaler Inhale 2 puffs into the lungs 2 times daily as needed (shortness of breath)      clindamycin (CLEOCIN) 150 MG capsule TAKE 2 CAPSULES BY MOUTH 1 HOUR PRIOR TO DENTAL APPOINTMENT. TAKE 1 CAPSULE BY MOUTH EVERY 6 HOURS AFTER APPOINTMENT      entecavir (BARACLUDE) 0.5 MG tablet TAKE 1 TABLET(0.5 MG) BY MOUTH DAILY 30 tablet 4    FLUoxetine (PROZAC) 20 MG capsule Take 1 capsule (20 mg) by mouth daily. With 40mg for a total daily dose of 60mg 90 capsule 1    FLUoxetine (PROZAC) 40 MG capsule Take 1 capsule (40 mg) by mouth daily. 90 capsule 1    fluticasone-salmeterol (ADVAIR) 250-50 MCG/ACT inhaler Inhale 1 puff into the lungs 2 times daily 180 each 3    furosemide (LASIX) 20 MG tablet Take 1 tablet (20 mg) by mouth daily as needed (fluid  retention) 90 tablet 1    hydrOXYzine HCl (ATARAX) 10 MG tablet Take 1 tablet (10 mg) by mouth daily as needed for anxiety 30 tablet 1    hydrOXYzine HCl (ATARAX) 25 MG tablet Take 1 tablet (25 mg) by mouth nightly as needed for anxiety (sleep) 90 tablet 1    isosorbide mononitrate (IMDUR) 60 MG 24 hr tablet Take 1 tablet (60 mg) by mouth daily 90 tablet 4    latanoprost (XALATAN) 0.005 % ophthalmic solution Place 1 drop into both eyes At Bedtime 2.5 mL 11    metoprolol succinate ER (TOPROL XL) 25 MG 24 hr tablet Take 0.5 tablets (12.5 mg) by mouth daily 90 tablet 3    Multiple Vitamins-Iron (ONE DAILY MULTIVITAMIN/IRON) TABS Take 1 tablet by mouth daily      mycophenolate (GENERIC EQUIVALENT) 250 MG capsule Take 3 capsules (750 mg) by mouth 2 times daily. 540 capsule 0    naltrexone (DEPADE/REVIA) 50 MG tablet TAKE 1 TABLET(50 MG) BY MOUTH DAILY 30 tablet 0    nitroGLYcerin (NITROSTAT) 0.4 MG sublingual tablet Place 1 tablet (0.4 mg) under the tongue every 5 minutes as needed for chest pain 20 tablet 3    Omega-3 Fatty Acids (OMEGA 3 PO) Take 1 capsule by mouth daily Dose unknown      pantoprazole (PROTONIX) 40 MG EC tablet Take 1 tablet (40 mg) by mouth daily. Please keep 11-6-24 clinic appt for refills. 90 tablet 0    polyethylene glycol (MIRALAX) 17 g packet Take 17 g by mouth daily as needed       predniSONE (DELTASONE) 5 MG tablet Take 1 tablet (5 mg) by mouth daily 90 tablet 3    rosuvastatin (CRESTOR) 20 MG tablet Take 1 tablet (20 mg) by mouth daily 90 tablet 0    tacrolimus (PROTOPIC) 0.1 % external ointment Apply topically 2 times daily as needed       No current facility-administered medications for this visit.     There are no discontinued medications.    Physical Exam   Vital Signs: There were no vitals taken for this visit.    GENERAL APPEARANCE: alert and no distress  HENT: mouth without ulcers or lesions  LYMPHATICS: no cervical or supraclavicular nodes  RESP: lungs clear to auscultation - no rales,  rhonchi or wheezes  CV: regular rhythm, normal rate, no rub, no murmur  EDEMA: no LE edema bilaterally  ABDOMEN: soft, nondistended, nontender, bowel sounds normal  MS: extremities normal - no gross deformities noted, no evidence of inflammation in joints, no muscle tenderness  SKIN: no rash, actinic keratoses  TX KIDNEY: normal  DIALYSIS ACCESS: none    Data         Latest Ref Rng & Units 5/14/2024     1:54 PM 10/2/2023     9:43 AM 9/1/2023     6:18 AM   Renal   Sodium 135 - 145 mmol/L 140  139     K 3.4 - 5.3 mmol/L 4.4  3.8  3.4    Cl 98 - 107 mmol/L 102  103     Cl (external) 98 - 107 mmol/L 102  103     CO2 22 - 29 mmol/L 24  27     Urea Nitrogen 8.0 - 23.0 mg/dL 12.1  8.6     Creatinine 0.67 - 1.17 mg/dL 0.89  0.70     Glucose 70 - 99 mg/dL 103  108     Calcium 8.8 - 10.2 mg/dL 9.8  9.6     Magnesium 1.7 - 2.3 mg/dL   1.9          Latest Ref Rng & Units 3/9/2021    12:11 PM 10/28/2020     8:57 AM 11/5/2018     7:39 AM   Bone Health   Phosphorus 2.5 - 4.5 mg/dL   2.9    Vit D Def 20 - 75 ug/L 49  36           Latest Ref Rng & Units 5/14/2024     1:54 PM 10/2/2023     9:43 AM 8/31/2023     5:57 AM   Heme   WBC 4.0 - 11.0 10e3/uL 7.6  8.1  7.6    Hgb 13.3 - 17.7 g/dL 12.8  12.6  9.9    Plt 150 - 450 10e3/uL 399  351  353          Latest Ref Rng & Units 8/31/2023     5:57 AM 8/30/2023     8:58 AM 8/21/2023     6:31 AM   Liver   AP 40 - 129 U/L 58  70  57    TBili <=1.2 mg/dL 1.0  2.0  0.7    ALT 0 - 70 U/L 7  9  7    AST 0 - 45 U/L 18  23  28    Tot Protein 6.4 - 8.3 g/dL 5.4  6.4  5.9    Albumin 3.5 - 5.2 g/dL 2.6  3.4  3.2          Latest Ref Rng & Units 6/12/2023    10:49 AM 10/21/2022     9:08 AM 10/28/2020     8:57 AM   Pancreas   A1C <5.7 %  6.0  5.8    Lipase (Roche) 13 - 60 U/L 26            Latest Ref Rng & Units 10/2/2023     9:43 AM 1/8/2009     9:33 AM   Iron studies   Iron 61 - 157 ug/dL 30     Iron Sat Index 15 - 46 % 10     Ferritin 31 - 409 ng/mL 50  76          Latest Ref Rng & Units 9/2/2021      2:54 PM 11/6/2018     6:47 AM 9/12/2017     9:23 AM   [facility]   CVM DNA Quant    Plasma, EDTA anticoagulant    CMV QUANT IU/ML Not Detected IU/mL Not Detected   CMV DNA Not Detected    LOG IU/ML OF CMVQNT <2.1 [Log_IU]/mL   Not Calculated    EBV DNA COPIES/ML EBVNEG^EBV DNA Not Detected [Copies]/mL  EBV DNA Not Detected     EBV DNA LOG OF COPIES <2.7 [Log_copies]/mL  Not Calculated       Failed to redirect to the Timeline version of the REV[employer] SmartLink.      Recent Labs   Lab Test 09/12/17  0923 11/06/18  0647 08/30/23  1442   DOSMPA 9pm 09.11.2017 Not Provided  --    MPACID 3.82* 0.55* 2.69   MPAG 69.3 29.5* 11.9*

## 2024-10-21 ENCOUNTER — VIRTUAL VISIT (OUTPATIENT)
Dept: ADDICTION MEDICINE | Facility: CLINIC | Age: 64
End: 2024-10-21
Payer: COMMERCIAL

## 2024-10-21 DIAGNOSIS — G47.00 INSOMNIA, UNSPECIFIED TYPE: ICD-10-CM

## 2024-10-21 DIAGNOSIS — F10.21 ALCOHOL USE DISORDER, SEVERE, IN SUSTAINED REMISSION (H): Primary | ICD-10-CM

## 2024-10-21 DIAGNOSIS — F41.1 GENERALIZED ANXIETY DISORDER: ICD-10-CM

## 2024-10-21 PROCEDURE — 99442 PR PHYSICIAN TELEPHONE EVALUATION 11-20 MIN: CPT | Mod: 93 | Performed by: NURSE PRACTITIONER

## 2024-10-21 RX ORDER — NALTREXONE HYDROCHLORIDE 50 MG/1
50 TABLET, FILM COATED ORAL DAILY
Qty: 30 TABLET | Refills: 2 | Status: SHIPPED | OUTPATIENT
Start: 2024-10-21

## 2024-10-21 ASSESSMENT — ANXIETY QUESTIONNAIRES
IF YOU CHECKED OFF ANY PROBLEMS ON THIS QUESTIONNAIRE, HOW DIFFICULT HAVE THESE PROBLEMS MADE IT FOR YOU TO DO YOUR WORK, TAKE CARE OF THINGS AT HOME, OR GET ALONG WITH OTHER PEOPLE: SOMEWHAT DIFFICULT
3. WORRYING TOO MUCH ABOUT DIFFERENT THINGS: SEVERAL DAYS
5. BEING SO RESTLESS THAT IT IS HARD TO SIT STILL: SEVERAL DAYS
8. IF YOU CHECKED OFF ANY PROBLEMS, HOW DIFFICULT HAVE THESE MADE IT FOR YOU TO DO YOUR WORK, TAKE CARE OF THINGS AT HOME, OR GET ALONG WITH OTHER PEOPLE?: SOMEWHAT DIFFICULT
GAD7 TOTAL SCORE: 5
1. FEELING NERVOUS, ANXIOUS, OR ON EDGE: SEVERAL DAYS
GAD7 TOTAL SCORE: 5
6. BECOMING EASILY ANNOYED OR IRRITABLE: NOT AT ALL
2. NOT BEING ABLE TO STOP OR CONTROL WORRYING: SEVERAL DAYS
GAD7 TOTAL SCORE: 5
4. TROUBLE RELAXING: SEVERAL DAYS
7. FEELING AFRAID AS IF SOMETHING AWFUL MIGHT HAPPEN: NOT AT ALL
7. FEELING AFRAID AS IF SOMETHING AWFUL MIGHT HAPPEN: NOT AT ALL

## 2024-10-21 ASSESSMENT — PATIENT HEALTH QUESTIONNAIRE - PHQ9
SUM OF ALL RESPONSES TO PHQ QUESTIONS 1-9: 3
SUM OF ALL RESPONSES TO PHQ QUESTIONS 1-9: 3
10. IF YOU CHECKED OFF ANY PROBLEMS, HOW DIFFICULT HAVE THESE PROBLEMS MADE IT FOR YOU TO DO YOUR WORK, TAKE CARE OF THINGS AT HOME, OR GET ALONG WITH OTHER PEOPLE: SOMEWHAT DIFFICULT

## 2024-10-21 ASSESSMENT — PAIN SCALES - GENERAL: PAINLEVEL: MILD PAIN (2)

## 2024-10-21 NOTE — PROGRESS NOTES
Time spent on phone call: 7583-2070 minutes  Originating Location (pt. Location): Home    Distant Location (provider location):  [facility]    Patient is unable to complete video visit due to lack of access to remote video technology.             [facility]    A/P                                                    ASSESSMENT/PLAN  1. Alcohol use disorder, severe, in sustained remission (H)  Stable in sustained remission. Is taking naltrexone 50 mg daily and wants to continue for now.   Remains actively involved in his recovery, continue.   - naltrexone (DEPADE/REVIA) 50 MG tablet; Take 1 tablet (50 mg) by mouth daily.  Dispense: 30 tablet; Refill: 2    2. Generalized anxiety disorder  3. Insomnia, unspecified type  Reports periods of increase anxiety, worse at night, impacting his sleep. Is taking fluoxetine and hydroxyzine and feels these medications are effective for him.   Has follow up with psychiatry 2/27/2025.   Not interested in therapy, has good coping skills in place.     Orders Placed This Encounter   Medications    naltrexone (DEPADE/REVIA) 50 MG tablet     Sig: Take 1 tablet (50 mg) by mouth daily.     Dispense:  30 tablet     Refill:  2       Problem list updated Oct 21, 2024   No problems updated.          PDMP Review         Value Time User    State PDMP site checked  Yes 10/21/2024 12:56 PM Sarahy Mullins, JADON              RTC  Return in about 13 weeks (around 1/20/2025) for Follow up, in person 1100.      Counseled the patient on the importance of having a recovery program in addition to medication to manage recovery.  Components include avoiding isolating, having willingness to change, avoiding triggers and managing cravings. Encouraged having some type of sober network and practicing honesty with trusted support person(s). Encouraged other services such as counseling, 12 step or other self-help organizations.            SUBJECTIVE                            "                         Jerad Ross is a 63 year old male with history of HTN, JULIANE, , CAD s/p CABG x3 9/2020, depression with anxiety, and alcohol use who presents for follow up.      Brief history: Initial visit 8/14/23. Jerad  is requesting a chemical health assessment to see if he \"should go to treatment of if AA is enough\". He reports that he began drinking 8 shots of whiskey 1-3 times weekly for past 4 months. States his depression and anxiety as well as opiate withdrawal triggered use escalating use. Has been prescribed opioid pain medication on/off since 3/2023 due to right hip pain 2/2 avascular necrosis which was revised 6/2023 and admits to taking more than prescribed on occasion and states that he realized he was taking them sometimes due to his emotions versus physical pain. He has not taken an opioid pain medication for past 3 weeks. States if he ever requires opioid pain medication in future, prefers to be tapered off.      He had a period of 4 years 3131-7186 of total abstinence from alcohol States he went to treatment for \"sexual compulsivity\" followed by a \"sober house\" and he just maintained abstinence after leaving sober [place]. States he went to sober house because he needed somewhere to go after leaving the [facility]. He is attending a 12 step group for his sexual compulsions. Has recently started attending AA as well.      Remote history of renal failure as an adolescent. Was on dialysis at age 16, and s/p kidney transplant at age 18. States he contracted hepatitis B from dialysis,      Substance Use History:   \"Have you ever had any history with [...] use?\" And \"When was your last use?  ALCOHOL - Drinks 8 shots of whiskey 1-3 times weekly for past 4 months, last drink 8/5/23.   CANNABIS - Denies  PRESCRIPTION STIMULANTS (includes Ritalin, Adderall, Vyvanse) - Denies  COCAINE/CRACK - Denies  METH/AMPHETAMINES (includes ecstacy, MDMA/connie) - Denies  OPIATES - Prescribed opiate pain medication " "on/off since 3/2023 due to right hip pain with revision 6/19/2023. States he took as prescribed mostly, but does admit to taking more than  prescribed on a few occasions. He needs to be tapered off of opioids in future as he did experience opiate w/d and began drinking to help with symptoms. No longer has prescription for opioids.   BENZODIAZEPINES (includes Ativan, Klonopin, Xanax) - Denies  KRATOM (mild opioid and stimulant effects) - Denies  KETAMINE - Denies  HALLUCINOGENS (includes DXM) - Denies  BEHAVIORAL (Gambling, Eating d/o, Compulsivity) - Sexual compulsivirty  History of treatment - Yes the [facility], \"sober house\", and 12 step  NICOTINE  Cigarettes: Denies    Recent HPI Details: 7/22/2024  1. Alcohol use disorder, severe, dependence (H)  Stable, denies cravings or return to use, last drink remains 8/5/2023.   Is taking naltrexone 50 mg daily, continue. No refill needed today.   Continue AA attendance and working with sponsor.      2. Generalized anxiety disorder  3. Insomnia, unspecified type  Overall is stable. Is taking hydroxyzine and prozac prescribed by psychiatry. Did experience an episode of increased anxiety and took 25 mg during daytime hours, is prescribed 10 mg during daytime and 25 mg at bedtime. Discussed with his sponsor who has recommended that he change his sober date.   Encouraged to schedule follow up appointment with his psych provider.        TODAY'S VISIT  HPI Oct 21, 2024    Things have been pretty good, on an up word spiral in the last 3 months.   Cravings have improved and has not returned to alcohol use, last drink 8/8/2023.   Is active in AA, good supports. \" I always heard dont stop before the miracle happens in AA. I feel like I am getting some of that now.\"  Has an intense fear of falling and getting hurts, Has been changing up his prayers thank ing God  for keeping him steady and safe.   Is still taking naltrexone 50 mg  Is experiencing persistent anxiety especially at night. " Taking hydroxyzine with relief.           7/22/2024    10:46 AM 8/29/2024     9:28 AM 10/21/2024    10:45 AM   PHQ   PHQ-9 Total Score 9 7 3   Q9: Thoughts of better off dead/self-harm past 2 weeks Not at all Not at all Not at all       OBJECTIVE                                                    PHYSICAL EXAM:  There were no vitals taken for this visit.      GENERAL: healthy, alert and no distress  RESP: no audible wheeze, cough.  No increased work of breathing.  Able to speak fully in complete sentences.  PSYCH: mentation appears normal, affect normal/bright, judgement and insight intact, normal speech      LAB      HISTORY                                                    Problem list reviewed & adjusted, as indicated.  Patient Active Problem List   Diagnosis    BCC (basal cell carcinoma), trunk    JULIANE (obstructive sleep apnea)    HTN, kidney transplant related    Coronary arteriosclerosis due to lipid rich plaque    NSTEMI (non-ST elevated myocardial infarction) (H)    Depression    Diverticulosis    Hemorrhoids    Kidney replaced by transplant    Basal cell carcinoma    Squamous cell carcinoma    Dyslipidemia    CRP elevated    Glaucoma    AION (acute ischemic optic neuropathy)    Hip pain    Inflamed seborrheic keratosis    Intertrigo    Chronic hepatitis B (H)    Immunosuppression (H)    Hypogonadism in male    GERD (gastroesophageal reflux disease)    Aftercare following organ transplant    Acute midline low back pain without sciatica    Septic bursitis    CAD -- S/P CABG x 3 in 2020    HTN (hypertension)    End stage renal disease (H)    Hip pain, right    Avascular necrosis of bones of both hips (H)    Chest pain, Probably chest wall in origin    Coronary artery disease of native artery of native heart with stable angina pectoris (H)    Failed total hip arthroplasty, sequela    Generalized muscle weakness    Generalized anxiety disorder    Transient hypotension    Alcohol abuse, in remission    Hypoxia     Multiple subsegmental pulmonary emboli without acute cor pulmonale (H)    Hypomagnesemia    Nonadherence to medication    Pure hypercholesterolemia         MEDICATION LIST (prior to visit)  Current Outpatient Medications   Medication Sig Dispense Refill    naltrexone (DEPADE/REVIA) 50 MG tablet Take 1 tablet (50 mg) by mouth daily. 30 tablet 2    acetaminophen (TYLENOL) 500 MG tablet Take 1,000 mg by mouth 3 times daily as needed for mild pain      albuterol (PROAIR HFA) 108 (90 Base) MCG/ACT inhaler Inhale 2 puffs into the lungs every 6 hours as needed for shortness of breath / dyspnea or wheezing 1 g 11    aspirin 81 MG EC tablet Take 1 tablet (81 mg) by mouth daily      budesonide (PULMICORT FLEXHALER) 90 MCG/ACT inhaler Inhale 2 puffs into the lungs 2 times daily as needed (shortness of breath)      clindamycin (CLEOCIN) 150 MG capsule TAKE 2 CAPSULES BY MOUTH 1 HOUR PRIOR TO DENTAL APPOINTMENT. TAKE 1 CAPSULE BY MOUTH EVERY 6 HOURS AFTER APPOINTMENT      entecavir (BARACLUDE) 0.5 MG tablet TAKE 1 TABLET(0.5 MG) BY MOUTH DAILY 30 tablet 4    FLUoxetine (PROZAC) 20 MG capsule Take 1 capsule (20 mg) by mouth daily. With 40mg for a total daily dose of 60mg 90 capsule 1    FLUoxetine (PROZAC) 40 MG capsule Take 1 capsule (40 mg) by mouth daily. 90 capsule 1    fluticasone-salmeterol (ADVAIR) 250-50 MCG/ACT inhaler Inhale 1 puff into the lungs 2 times daily 180 each 3    furosemide (LASIX) 20 MG tablet Take 1 tablet (20 mg) by mouth daily as needed (fluid retention) 90 tablet 1    hydrOXYzine HCl (ATARAX) 10 MG tablet Take 1 tablet (10 mg) by mouth daily as needed for anxiety 30 tablet 1    hydrOXYzine HCl (ATARAX) 25 MG tablet Take 1 tablet (25 mg) by mouth nightly as needed for anxiety (sleep) 90 tablet 1    isosorbide mononitrate (IMDUR) 60 MG 24 hr tablet Take 1 tablet (60 mg) by mouth daily 90 tablet 4    latanoprost (XALATAN) 0.005 % ophthalmic solution Place 1 drop into both eyes At Bedtime 2.5 mL 11     metoprolol succinate ER (TOPROL XL) 25 MG 24 hr tablet Take 0.5 tablets (12.5 mg) by mouth daily 90 tablet 3    Multiple Vitamins-Iron (ONE DAILY MULTIVITAMIN/IRON) TABS Take 1 tablet by mouth daily      mycophenolate (GENERIC EQUIVALENT) 250 MG capsule Take 3 capsules (750 mg) by mouth 2 times daily. 540 capsule 0    nitroGLYcerin (NITROSTAT) 0.4 MG sublingual tablet Place 1 tablet (0.4 mg) under the tongue every 5 minutes as needed for chest pain 20 tablet 3    Omega-3 Fatty Acids (OMEGA 3 PO) Take 1 capsule by mouth daily Dose unknown      pantoprazole (PROTONIX) 40 MG EC tablet Take 1 tablet (40 mg) by mouth daily. Please keep 11-6-24 clinic appt for refills. 90 tablet 0    polyethylene glycol (MIRALAX) 17 g packet Take 17 g by mouth daily as needed       predniSONE (DELTASONE) 5 MG tablet Take 1 tablet (5 mg) by mouth daily 90 tablet 3    rosuvastatin (CRESTOR) 20 MG tablet Take 1 tablet (20 mg) by mouth daily 90 tablet 0    tacrolimus (PROTOPIC) 0.1 % external ointment Apply topically 2 times daily as needed       No current facility-administered medications for this visit.       MEDICATION LIST (after visit)  Current Outpatient Medications   Medication Sig Dispense Refill    naltrexone (DEPADE/REVIA) 50 MG tablet Take 1 tablet (50 mg) by mouth daily. 30 tablet 2    acetaminophen (TYLENOL) 500 MG tablet Take 1,000 mg by mouth 3 times daily as needed for mild pain      albuterol (PROAIR HFA) 108 (90 Base) MCG/ACT inhaler Inhale 2 puffs into the lungs every 6 hours as needed for shortness of breath / dyspnea or wheezing 1 g 11    aspirin 81 MG EC tablet Take 1 tablet (81 mg) by mouth daily      budesonide (PULMICORT FLEXHALER) 90 MCG/ACT inhaler Inhale 2 puffs into the lungs 2 times daily as needed (shortness of breath)      clindamycin (CLEOCIN) 150 MG capsule TAKE 2 CAPSULES BY MOUTH 1 HOUR PRIOR TO DENTAL APPOINTMENT. TAKE 1 CAPSULE BY MOUTH EVERY 6 HOURS AFTER APPOINTMENT      entecavir (BARACLUDE) 0.5 MG  tablet TAKE 1 TABLET(0.5 MG) BY MOUTH DAILY 30 tablet 4    FLUoxetine (PROZAC) 20 MG capsule Take 1 capsule (20 mg) by mouth daily. With 40mg for a total daily dose of 60mg 90 capsule 1    FLUoxetine (PROZAC) 40 MG capsule Take 1 capsule (40 mg) by mouth daily. 90 capsule 1    fluticasone-salmeterol (ADVAIR) 250-50 MCG/ACT inhaler Inhale 1 puff into the lungs 2 times daily 180 each 3    furosemide (LASIX) 20 MG tablet Take 1 tablet (20 mg) by mouth daily as needed (fluid retention) 90 tablet 1    hydrOXYzine HCl (ATARAX) 10 MG tablet Take 1 tablet (10 mg) by mouth daily as needed for anxiety 30 tablet 1    hydrOXYzine HCl (ATARAX) 25 MG tablet Take 1 tablet (25 mg) by mouth nightly as needed for anxiety (sleep) 90 tablet 1    isosorbide mononitrate (IMDUR) 60 MG 24 hr tablet Take 1 tablet (60 mg) by mouth daily 90 tablet 4    latanoprost (XALATAN) 0.005 % ophthalmic solution Place 1 drop into both eyes At Bedtime 2.5 mL 11    metoprolol succinate ER (TOPROL XL) 25 MG 24 hr tablet Take 0.5 tablets (12.5 mg) by mouth daily 90 tablet 3    Multiple Vitamins-Iron (ONE DAILY MULTIVITAMIN/IRON) TABS Take 1 tablet by mouth daily      mycophenolate (GENERIC EQUIVALENT) 250 MG capsule Take 3 capsules (750 mg) by mouth 2 times daily. 540 capsule 0    nitroGLYcerin (NITROSTAT) 0.4 MG sublingual tablet Place 1 tablet (0.4 mg) under the tongue every 5 minutes as needed for chest pain 20 tablet 3    Omega-3 Fatty Acids (OMEGA 3 PO) Take 1 capsule by mouth daily Dose unknown      pantoprazole (PROTONIX) 40 MG EC tablet Take 1 tablet (40 mg) by mouth daily. Please keep 11-6-24 clinic appt for refills. 90 tablet 0    polyethylene glycol (MIRALAX) 17 g packet Take 17 g by mouth daily as needed       predniSONE (DELTASONE) 5 MG tablet Take 1 tablet (5 mg) by mouth daily 90 tablet 3    rosuvastatin (CRESTOR) 20 MG tablet Take 1 tablet (20 mg) by mouth daily 90 tablet 0    tacrolimus (PROTOPIC) 0.1 % external ointment Apply topically 2  times daily as needed       No current facility-administered medications for this visit.         Allergies   Allergen Reactions    Penicillins Shortness Of Breath and Hives    Keflex [Cephalexin Hcl] Unknown     Patient could not recall reaction    Sulfa Antibiotics Rash    Tetracycline Unknown     Patient could not recall reaction        Continued Complex Management  The longitudinal plan of care for Alcohol Use Disorder (AUD) was addressed during this visit. Due to the added complexity in care, I will continue to support Jerad in the subsequent management and with ongoing continuity of care.        Sarahy Mullisn, DNP,MSN, AGNP-C  [facility]   [address] , [address] [place] [address]   Phone # [phone]

## 2024-10-21 NOTE — NURSING NOTE
Current patient location: [address] [place] [address]    Is the patient currently in the state of [place]? YES    Visit mode:VIDEO    If the visit is dropped, the patient can be reconnected by: VIDEO VISIT: Text to cell phone:   Telephone Information:   Mobile [phone]       Will anyone else be joining the visit? NO  (If patient encounters technical issues they should call [phone])    Are changes needed to the allergy or medication list? Pt stated no changes to allergies and Pt stated no med changes    Are refills needed on medications prescribed by this physician? YES  Patient requested refill on hydrOXYzine HCl (ATARAX) 10 MG tablet     Rooming Documentation:  Questionnaire(s) completed    Reason for visit: RECHECK    Arlette LUNA

## 2024-10-29 ENCOUNTER — TELEPHONE (OUTPATIENT)
Dept: GASTROENTEROLOGY | Facility: CLINIC | Age: 64
End: 2024-10-29
Payer: COMMERCIAL

## 2024-10-29 DIAGNOSIS — B18.1 CHRONIC VIRAL HEPATITIS B WITHOUT DELTA AGENT AND WITHOUT COMA (H): Primary | ICD-10-CM

## 2024-10-29 NOTE — TELEPHONE ENCOUNTER
Was the patient contacted by phone and reminded of the upcoming visit? message left, video visit.     Were ordered labs and tests completed prior to the appointment? No, message left to call his local [facility] to get labs drawn prior to appointment and to set up ultrasound.     Were the needed lab orders placed? Yes    Heather Castro, [facility]  10/29/2024 3:24 PM

## 2024-10-31 ENCOUNTER — LAB (OUTPATIENT)
Dept: LAB | Facility: CLINIC | Age: 64
End: 2024-10-31
Payer: COMMERCIAL

## 2024-10-31 ENCOUNTER — ANCILLARY PROCEDURE (OUTPATIENT)
Dept: ULTRASOUND IMAGING | Facility: CLINIC | Age: 64
End: 2024-10-31
Attending: INTERNAL MEDICINE
Payer: COMMERCIAL

## 2024-10-31 DIAGNOSIS — B18.1 CHRONIC HEPATITIS B (H): ICD-10-CM

## 2024-10-31 DIAGNOSIS — B18.1 CHRONIC VIRAL HEPATITIS B WITHOUT DELTA AGENT AND WITHOUT COMA (H): ICD-10-CM

## 2024-10-31 LAB
AFP SERPL-MCNC: <1.8 NG/ML
ALBUMIN SERPL BCG-MCNC: 3.8 G/DL (ref 3.5–5.2)
ALP SERPL-CCNC: 70 U/L (ref 40–150)
ALT SERPL W P-5'-P-CCNC: 27 U/L (ref 0–70)
ANION GAP SERPL CALCULATED.3IONS-SCNC: 10 MMOL/L (ref 7–15)
AST SERPL W P-5'-P-CCNC: 35 U/L (ref 0–45)
BILIRUB DIRECT SERPL-MCNC: 0.34 MG/DL (ref 0–0.3)
BILIRUB SERPL-MCNC: 1 MG/DL
BUN SERPL-MCNC: 11.2 MG/DL (ref 8–23)
CALCIUM SERPL-MCNC: 9.6 MG/DL (ref 8.8–10.4)
CHLORIDE SERPL-SCNC: 103 MMOL/L (ref 98–107)
CREAT SERPL-MCNC: 0.84 MG/DL (ref 0.67–1.17)
EGFRCR SERPLBLD CKD-EPI 2021: >90 ML/MIN/1.73M2
ERYTHROCYTE [DISTWIDTH] IN BLOOD BY AUTOMATED COUNT: 16.6 % (ref 10–15)
GLUCOSE SERPL-MCNC: 85 MG/DL (ref 70–99)
HCO3 SERPL-SCNC: 27 MMOL/L (ref 22–29)
HCT VFR BLD AUTO: 42.7 % (ref 40–53)
HGB BLD-MCNC: 13.6 G/DL (ref 13.3–17.7)
INR PPP: 1.03 (ref 0.85–1.15)
MCH RBC QN AUTO: 28 PG (ref 26.5–33)
MCHC RBC AUTO-ENTMCNC: 31.9 G/DL (ref 31.5–36.5)
MCV RBC AUTO: 88 FL (ref 78–100)
PLATELET # BLD AUTO: 272 10E3/UL (ref 150–450)
POTASSIUM SERPL-SCNC: 4.1 MMOL/L (ref 3.4–5.3)
PROT SERPL-MCNC: 6.6 G/DL (ref 6.4–8.3)
RBC # BLD AUTO: 4.85 10E6/UL (ref 4.4–5.9)
SODIUM SERPL-SCNC: 140 MMOL/L (ref 135–145)
WBC # BLD AUTO: 8.2 10E3/UL (ref 4–11)

## 2024-10-31 PROCEDURE — 86692 HEPATITIS DELTA AGENT ANTBDY: CPT | Mod: 90 | Performed by: PATHOLOGY

## 2024-10-31 PROCEDURE — 99000 SPECIMEN HANDLING OFFICE-LAB: CPT | Performed by: PATHOLOGY

## 2024-10-31 PROCEDURE — 87517 HEPATITIS B DNA QUANT: CPT | Performed by: INTERNAL MEDICINE

## 2024-10-31 PROCEDURE — 80053 COMPREHEN METABOLIC PANEL: CPT | Performed by: PATHOLOGY

## 2024-10-31 PROCEDURE — 85027 COMPLETE CBC AUTOMATED: CPT | Performed by: PATHOLOGY

## 2024-10-31 PROCEDURE — 82248 BILIRUBIN DIRECT: CPT | Performed by: PATHOLOGY

## 2024-10-31 PROCEDURE — 76705 ECHO EXAM OF ABDOMEN: CPT | Mod: GC | Performed by: STUDENT IN AN ORGANIZED HEALTH CARE EDUCATION/TRAINING PROGRAM

## 2024-10-31 PROCEDURE — 85610 PROTHROMBIN TIME: CPT | Performed by: PATHOLOGY

## 2024-10-31 PROCEDURE — 82105 ALPHA-FETOPROTEIN SERUM: CPT | Performed by: INTERNAL MEDICINE

## 2024-10-31 PROCEDURE — 36415 COLL VENOUS BLD VENIPUNCTURE: CPT | Performed by: PATHOLOGY

## 2024-11-01 ENCOUNTER — VIRTUAL VISIT (OUTPATIENT)
Dept: GASTROENTEROLOGY | Facility: CLINIC | Age: 64
End: 2024-11-01
Attending: INTERNAL MEDICINE
Payer: COMMERCIAL

## 2024-11-01 DIAGNOSIS — B18.1 CHRONIC HEPATITIS B (H): ICD-10-CM

## 2024-11-01 DIAGNOSIS — B18.1 CHRONIC VIRAL HEPATITIS B WITHOUT DELTA AGENT AND WITHOUT COMA (H): ICD-10-CM

## 2024-11-01 LAB — HBV DNA SERPL NAA+PROBE-ACNC: NOT DETECTED IU/ML

## 2024-11-01 PROCEDURE — 99214 OFFICE O/P EST MOD 30 MIN: CPT | Mod: 95 | Performed by: INTERNAL MEDICINE

## 2024-11-01 RX ORDER — ENTECAVIR 0.5 MG/1
0.5 TABLET, FILM COATED ORAL DAILY
Qty: 90 TABLET | Refills: 3 | Status: SHIPPED | OUTPATIENT
Start: 2024-11-01

## 2024-11-01 NOTE — LETTER
11/1/2024      Jerad Ross  [address] [place] [address]      Dear Colleague,    Thank you for referring your patient, Jerad Ross, to the [facility] [place]. Please see a copy of my visit note below.    [facility]  LIVER CLINIC FOLLOW UP  Video Visit  A/P  Mr. Ross is a 64 Y M with HBV.     HBV He is on entecavir with normal liver tests. Renewed entecavir.  F1-F2 by fibrosis scan in 2017 and F0-1 in 2024.   HBV DNA below limits of detection in past including results yesterday.  Will continue with every 6 month labs and US. Ordered.  HCC screening US 10/31/24 no mass. Repeat in 6 mo. Ordered.    CRC screening due 2025. Ordered    RTC 1 y in person or video  Lina Claros MD    Hepatology/Liver Transplant  Medical Director, Liver Transplantation  [facility]    Appointments [phone]  Clinic Fax [phone]  Transplant Care [phone] Option 4  Transplant Fax [phone]  Administrative Office [phone]  Administrative Fax [phone]  ===================================================================  Subjective  Jerad Ross is a 64 Y M with hepatitis B. 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 On entecavir since 2016. He is having no problems getting it through a marjan and takes it daily.    He states he has been well in general but feels more tired than usual. He has not seen his PCP in quite a while.    HBV DNA pending.  eAby positive  eAg negative  Assessment of fibrosis: fibrosis scan 9/16/17 F1-F2  Histrory of treatment: entecavir only. Was not able to get it reliably in past. Now he can and has been taking it consistently.      HCV negative in 2008  HIV not documented  ETOH use: none    Liver Function Studies -   Recent Labs   Lab Test 10/31/24  1237   PROTTOTAL 6.6   ALBUMIN 3.8   BILITOTAL 1.0   ALKPHOS 70   AST 35   ALT 27   CBC RESULTS:   Recent Labs   Lab Test  10/31/24  1237   WBC 8.2   RBC 4.85   HGB 13.6   HCT 42.7   MCV 88   MCH 28.0   MCHC 31.9   RDW 16.6*            Lab Test 07/19/22  1036   PROTTOTAL 7.4   ALBUMIN 3.5   BILITOTAL 1.3   ALKPHOS 74   AST 27   ALT 22     CBC RESULTS: Recent Labs   Lab Test 07/19/22  1036   WBC 8.7   RBC 4.98   HGB 14.4   HCT 44.8   MCV 90   MCH 28.9   MCHC 32.1   RDW 15.6*             Past Medical History        Past Medical History:   Diagnosis Date     AION (acute ischemic optic neuropathy)       Anemia in chronic renal disease       Avascular necrosis of bones of both hips (H)       s/p bilateral hip replacements     Basal cell carcinoma       CAD (coronary artery disease) 6/17/2014     Chronic hepatitis B (H)       Clostridium difficile colitis       CRP elevated       Depression       Diverticulosis       Dyslipidemia       FSGS (focal segmental glomerulosclerosis)       Gastric ulcer with hemorrhage 2/12/12     GERD (gastroesophageal reflux disease)       Glaucoma       OHTN     Hemorrhoids       Hypertension secondary to other renal disorders       Hypogonadism in male       Immunosuppressed status (H)       Kidney replaced by transplant       focal glomerulosclerosis      NSTEMI (non-ST elevated myocardial infarction) (H) 6/17/2014     JULIANE (obstructive sleep apnea)       Doesn't use CPAP     Paracentral scotoma       LE     Secondary renal hyperparathyroidism (H)       Squamous cell carcinoma             Social History   Social History            Social History     Marital status:        Spouse name: N/A     Number of children: 0     Years of education: N/A           Occupational History       Disabled      [employer]              Social History Main Topics     Smoking status: Former Smoker       Packs/day: 1.00       Years: 9.00       Quit date: 1/1/1988     Smokeless tobacco: Former User     Alcohol use 0.0 oz/week        0 Standard drinks or equivalent per week          Comment: rarely  1 drink per month     Drug use: No     Sexual activity: Not on file            Other Topics Concern     Special Diet No     Exercise Yes       walks      Social History Narrative           Family History          Family History   Problem Relation Age of Onset     Cardiovascular Father         AI with valve repair     Hypertension Father       KIDNEY DISEASE Father       CANCER Paternal Grandmother         ovarian ca     CEREBROVASCULAR DISEASE Paternal Grandfather       CEREBROVASCULAR DISEASE Maternal Grandfather       KIDNEY DISEASE Paternal Aunt       Skin Cancer No family hx of       Glaucoma No family hx of       Macular Degeneration No family hx of       Amblyopia No family hx of             ROS: 10 point ROS neg other than the symptoms noted above in the HPI.  Exam  Gen Alert pleasant NAD  Resp No difficulty breathing. No cough  Skin No Jaundice  Eyes No icterus  Neuro EDMONDSON  MSK no muscle wasting  Psyche Pleasant, appropriate. Well groomed.    Jerad Ross is a 64 year old male who is being evaluated via a billable video visit.    Video-Visit Details  Type of service:  Video Visit  Video Start Time:1127  Video End Time:  Originating Location (pt. Location):home  Distant Location (provider location):  [facility] [place]      Platform used for Video Visit: Sander or Skyla            Again, thank you for allowing me to participate in the care of your patient.        Sincerely,        Lina Claros MD

## 2024-11-01 NOTE — PROGRESS NOTES
"Virtual Visit Details    Type of service:  Video Visit   Video Start Time: {video visit start/end time for provider to select:172677}  Video End Time:{video visit start/end time for provider to select:302262}    Originating Location (pt. Location): {video visit patient location:529757::\"Home\"}  {PROVIDER LOCATION On-site should be selected for visits conducted from your clinic location or adjoining [facility] hospital, academic office, or other nearby [facility] building. Off-site should be selected for all other provider locations, including home:[address]}  Distant Location (provider location):  {virtual location provider:947036}  Platform used for Video Visit: {Virtual Visit Platforms:604503::\"Discrete Sport\"}  "

## 2024-11-01 NOTE — NURSING NOTE
Current patient location: [address] [place] [address]    Is the patient currently in the state of [place]? YES    Visit mode:VIDEO    If the visit is dropped, the patient can be reconnected by: VIDEO VISIT: Text to cell phone:   Telephone Information:   Mobile [phone]       Will anyone else be joining the visit? NO  (If patient encounters technical issues they should call [phone])    Are changes needed to the allergy or medication list? No    Are refills needed on medications prescribed by this physician? Discuss with provider    Rooming Documentation:  Questionnaire(s) completed    Reason for visit: RECHECK    Salvador LUNA

## 2024-11-01 NOTE — PROGRESS NOTES
[facility]  LIVER CLINIC FOLLOW UP  Video Visit  A/P  Mr. Ross is a 64 Y M with HBV.     HBV He is on entecavir with normal liver tests. Renewed entecavir.  F1-F2 by fibrosis scan in 2017 and F0-1 in 2024.   HBV DNA below limits of detection in past including results yesterday.  Will continue with every 6 month labs and US. Ordered.  HCC screening US 10/31/24 no mass. Repeat in 6 mo. Ordered.    CRC screening due 2025. Ordered    RTC 1 y in person or video  Lina Claros MD    Hepatology/Liver Transplant  Medical Director, Liver Transplantation  [facility]    Appointments [phone]  Clinic Fax [phone]  Transplant Care [phone] Option 4  Transplant Fax [phone]  Administrative Office [phone]  Administrative Fax [phone]  ===================================================================  Subjective  Jerad Ross is a 64 Y M with hepatitis B. 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 On entecavir since 2016. He is having no problems getting it through a marjan and takes it daily.    He states he has been well in general but feels more tired than usual. He has not seen his PCP in quite a while.    HBV DNA pending.  eAby positive  eAg negative  Assessment of fibrosis: fibrosis scan 9/16/17 F1-F2  Histrory of treatment: entecavir only. Was not able to get it reliably in past. Now he can and has been taking it consistently.      HCV negative in 2008  HIV not documented  ETOH use: none    Liver Function Studies -   Recent Labs   Lab Test 10/31/24  1237   PROTTOTAL 6.6   ALBUMIN 3.8   BILITOTAL 1.0   ALKPHOS 70   AST 35   ALT 27   CBC RESULTS:   Recent Labs   Lab Test 10/31/24  1237   WBC 8.2   RBC 4.85   HGB 13.6   HCT 42.7   MCV 88   MCH 28.0   MCHC 31.9   RDW 16.6*            Lab Test 07/19/22  1036   PROTTOTAL 7.4   ALBUMIN 3.5   BILITOTAL 1.3   ALKPHOS 74   AST 27   ALT 22     CBC RESULTS: Recent Labs   Lab  Test 07/19/22  1036   WBC 8.7   RBC 4.98   HGB 14.4   HCT 44.8   MCV 90   MCH 28.9   MCHC 32.1   RDW 15.6*             Past Medical History        Past Medical History:   Diagnosis Date    AION (acute ischemic optic neuropathy)      Anemia in chronic renal disease      Avascular necrosis of bones of both hips (H)       s/p bilateral hip replacements    Basal cell carcinoma      CAD (coronary artery disease) 6/17/2014    Chronic hepatitis B (H)      Clostridium difficile colitis      CRP elevated      Depression      Diverticulosis      Dyslipidemia      FSGS (focal segmental glomerulosclerosis)      Gastric ulcer with hemorrhage 2/12/12    GERD (gastroesophageal reflux disease)      Glaucoma       OHTN    Hemorrhoids      Hypertension secondary to other renal disorders      Hypogonadism in male      Immunosuppressed status (H)      Kidney replaced by transplant       focal glomerulosclerosis     NSTEMI (non-ST elevated myocardial infarction) (H) 6/17/2014    JULIANE (obstructive sleep apnea)       Doesn't use CPAP    Paracentral scotoma       LE    Secondary renal hyperparathyroidism (H)      Squamous cell carcinoma             Social History   Social History            Social History    Marital status:        Spouse name: N/A    Number of children: 0    Years of education: N/A           Occupational History      Disabled     [employer]              Social History Main Topics    Smoking status: Former Smoker       Packs/day: 1.00       Years: 9.00       Quit date: 1/1/1988    Smokeless tobacco: Former User    Alcohol use 0.0 oz/week        0 Standard drinks or equivalent per week          Comment: rarely 1 drink per month    Drug use: No    Sexual activity: Not on file            Other Topics Concern    Special Diet No    Exercise Yes       walks      Social History Narrative           Family History          Family History   Problem Relation Age of Onset    Cardiovascular Father          AI with valve repair    Hypertension Father      KIDNEY DISEASE Father      CANCER Paternal Grandmother         ovarian ca    CEREBROVASCULAR DISEASE Paternal Grandfather      CEREBROVASCULAR DISEASE Maternal Grandfather      KIDNEY DISEASE Paternal Aunt      Skin Cancer No family hx of      Glaucoma No family hx of      Macular Degeneration No family hx of      Amblyopia No family hx of             ROS: 10 point ROS neg other than the symptoms noted above in the HPI.  Exam  Gen Alert pleasant NAD  Resp No difficulty breathing. No cough  Skin No Jaundice  Eyes No icterus  Neuro EDMONDSON  MSK no muscle wasting  Psyche Pleasant, appropriate. Well groomed.    Jerad Ross is a 64 year old male who is being evaluated via a billable video visit.    Video-Visit Details  Type of service:  Video Visit  Video Start Time:1127  Video End Time: 1153  Originating Location (pt. Location):home  Distant Location (provider location):  [facility] [place]      Platform used for Video Visit: [employer] or [email or web address]

## 2024-11-04 LAB — HDV AB SER QL IA: NEGATIVE

## 2024-11-05 SDOH — HEALTH STABILITY: PHYSICAL HEALTH: ON AVERAGE, HOW MANY DAYS PER WEEK DO YOU ENGAGE IN MODERATE TO STRENUOUS EXERCISE (LIKE A BRISK WALK)?: 5 DAYS

## 2024-11-05 SDOH — HEALTH STABILITY: PHYSICAL HEALTH: ON AVERAGE, HOW MANY MINUTES DO YOU ENGAGE IN EXERCISE AT THIS LEVEL?: 20 MIN

## 2024-11-05 ASSESSMENT — ASTHMA QUESTIONNAIRES
QUESTION_3 LAST FOUR WEEKS HOW OFTEN DID YOUR ASTHMA SYMPTOMS (WHEEZING, COUGHING, SHORTNESS OF BREATH, CHEST TIGHTNESS OR PAIN) WAKE YOU UP AT NIGHT OR EARLIER THAN USUAL IN THE MORNING: NOT AT ALL
QUESTION_5 LAST FOUR WEEKS HOW WOULD YOU RATE YOUR ASTHMA CONTROL: WELL CONTROLLED
QUESTION_2 LAST FOUR WEEKS HOW OFTEN HAVE YOU HAD SHORTNESS OF BREATH: ONCE OR TWICE A WEEK
QUESTION_4 LAST FOUR WEEKS HOW OFTEN HAVE YOU USED YOUR RESCUE INHALER OR NEBULIZER MEDICATION (SUCH AS ALBUTEROL): NOT AT ALL
ACT_TOTALSCORE: 23
QUESTION_1 LAST FOUR WEEKS HOW MUCH OF THE TIME DID YOUR ASTHMA KEEP YOU FROM GETTING AS MUCH DONE AT WORK, SCHOOL OR AT HOME: NONE OF THE TIME
ACT_TOTALSCORE: 23

## 2024-11-05 ASSESSMENT — SOCIAL DETERMINANTS OF HEALTH (SDOH): HOW OFTEN DO YOU GET TOGETHER WITH FRIENDS OR RELATIVES?: MORE THAN THREE TIMES A WEEK

## 2024-11-06 ENCOUNTER — OFFICE VISIT (OUTPATIENT)
Dept: INTERNAL MEDICINE | Facility: CLINIC | Age: 64
End: 2024-11-06
Payer: COMMERCIAL

## 2024-11-06 VITALS
BODY MASS INDEX: 25.34 KG/M2 | OXYGEN SATURATION: 97 % | WEIGHT: 167.2 LBS | SYSTOLIC BLOOD PRESSURE: 129 MMHG | DIASTOLIC BLOOD PRESSURE: 86 MMHG | TEMPERATURE: 97.6 F | RESPIRATION RATE: 16 BRPM | HEIGHT: 68 IN | HEART RATE: 51 BPM

## 2024-11-06 DIAGNOSIS — E78.00 PURE HYPERCHOLESTEROLEMIA: ICD-10-CM

## 2024-11-06 DIAGNOSIS — R09.89 PULSE IRREGULARITY: ICD-10-CM

## 2024-11-06 DIAGNOSIS — Z95.1 S/P CABG (CORONARY ARTERY BYPASS GRAFT): ICD-10-CM

## 2024-11-06 DIAGNOSIS — Z95.1 S/P CABG (CORONARY ARTERY BYPASS GRAFT): Primary | ICD-10-CM

## 2024-11-06 LAB
ATRIAL RATE - MUSE: 48 BPM
DIASTOLIC BLOOD PRESSURE - MUSE: NORMAL MMHG
INTERPRETATION ECG - MUSE: NORMAL
P AXIS - MUSE: NORMAL DEGREES
PR INTERVAL - MUSE: 128 MS
QRS DURATION - MUSE: 82 MS
QT - MUSE: 488 MS
QTC - MUSE: 435 MS
R AXIS - MUSE: -23 DEGREES
SYSTOLIC BLOOD PRESSURE - MUSE: NORMAL MMHG
T AXIS - MUSE: 63 DEGREES
VENTRICULAR RATE- MUSE: 48 BPM

## 2024-11-06 PROCEDURE — 99396 PREV VISIT EST AGE 40-64: CPT | Mod: 25 | Performed by: INTERNAL MEDICINE

## 2024-11-06 PROCEDURE — 93000 ELECTROCARDIOGRAM COMPLETE: CPT | Performed by: INTERNAL MEDICINE

## 2024-11-06 RX ORDER — METOPROLOL SUCCINATE 25 MG/1
12.5 TABLET, EXTENDED RELEASE ORAL DAILY
Qty: 90 TABLET | Refills: 3 | Status: CANCELLED | OUTPATIENT
Start: 2024-11-06

## 2024-11-06 RX ORDER — ROSUVASTATIN CALCIUM 20 MG/1
20 TABLET, COATED ORAL DAILY
Qty: 90 TABLET | Refills: 3 | Status: SHIPPED | OUTPATIENT
Start: 2024-11-06

## 2024-11-06 NOTE — PROGRESS NOTES
HPI  64-year-old here today for physical examination.  He has been doing reasonably well.  He is walking about 20 minutes 5 days a week he is tolerating this well without any chest pain dyspnea dizziness lightheadedness or other complaints.  He is trying to eat relatively healthy.  He presently has no specific symptoms or complaints.  He has not noted any rapid or irregular heart rhythm he has had no problems on his present medications.  Past Medical History:   Diagnosis Date    Acute midline low back pain without sciatica     AION (acute ischemic optic neuropathy)     Anemia in chronic renal disease     Arthritis     Avascular necrosis of bones of both hips (H)     s/p bilateral hip replacements    Avascular necrosis of bones of both hips (H)     s/p bilateral hip replacements    Basal cell carcinoma     Chronic hepatitis B (H)     Clostridium difficile colitis     COPD (chronic obstructive pulmonary disease) (H)     Coronary artery disease involving native coronary artery of native heart without angina pectoris 06/17/2014    Coronary angiogram 6/17/14: Severe distal 3-vessel disease involving small vessels, not amenable to PCI or CABG.    CRP elevated     Depression     Depressive disorder     Diverticulosis     Dyslipidemia     Elevated C-reactive protein (CRP)     FSGS (focal segmental glomerulosclerosis)     FSGS (focal segmental glomerulosclerosis)     Gastric ulcer with hemorrhage 02/12/2012    Gastric ulcer with hemorrhage 02/12/2012    GERD (gastroesophageal reflux disease)     Glaucoma     OHTN    Glaucoma     Hemorrhoids     Hemorrhoids     History of blood transfusion     HTN (hypertension)     Hyperlipidemia     Hypertension secondary to other renal disorders     Hypogonadism in male     Immunosuppressed status (H)     Kidney replaced by transplant     focal glomerulosclerosis     Kidney transplanted     focal glomerulosclerosis     NSTEMI (non-ST elevated myocardial infarction) (H) 06/17/2014    NSTEMI  (non-ST elevated myocardial infarction) (H) 06/17/2014    JULIANE (obstructive sleep apnea)     Doesn't use CPAP    Paracentral scotoma     LE    Secondary renal hyperparathyroidism (H)     Secondary renal hyperparathyroidism (H)     Septic bursitis     Squamous cell carcinoma     Uncomplicated asthma      Past Surgical History:   Procedure Laterality Date    APPENDECTOMY      ARTHROPLASTY REVISION HIP Right 6/19/2023    Procedure: RIGHT HIP REVISION;  Surgeon: Juan Mcdonough MD;  Location: [facility] OR    BIOPSY      Cardiac Bypass surgery  06/08/2020    [facility]     CATARACT EXTRACTION EXTRACAPSULAR W/ INTRAOCULAR LENS IMPLANTATION Bilateral 4-20-10, 6-1-10    CATARACT IOL, RT/LT  04/19/2000    RE    CATARACT IOL, RT/LT  06/01/2000    LE    COLECTOMY PARTIAL  01/01/1983     10 cm, diverticulitis     COLECTOMY SUBTOTAL  1983    10 cm, diverticulitis    COLONOSCOPY  02/13/2012    Procedure:COLONOSCOPY; Surgeon:SLOAN GALLARDO; Location: GI    COLONOSCOPY N/A 01/22/2020    Procedure: Colonoscopy, With Polypectomy And Biopsy;  Surgeon: Aston Kiran MD;  Location:  GI    CV CORONARY ANGIOGRAM N/A 06/02/2020    Procedure: Coronary Angiogram;  Surgeon: Alona Florentino MD;  Location: [facility] Cath Lab;  Service: Cardiology    CV CORONARY ANGIOGRAM N/A 09/20/2021    Procedure: Coronary Angiogram;  Surgeon: Adam Agudelo MD;  Location: [facility] CV    CV LEFT HEART CATHETERIZATION WITHOUT LEFT VENTRICULOGRAM Left 06/02/2020    Procedure: Left Heart Catheterization Without Left Ventriculogram;  Surgeon: Alona Florentino MD;  Location: [facility] Cath Lab;  Service: Cardiology    CV SUPRAVALVULAR AORTOGRAM N/A 09/20/2021    Procedure: Supravalvular Aortagram;  Surgeon: Adam Agudelo MD;  Location: [facility] CV    ESOPHAGOSCOPY, GASTROSCOPY, DUODENOSCOPY (EGD), COMBINED  02/13/2012    Procedure:COMBINED ESOPHAGOSCOPY, GASTROSCOPY, DUODENOSCOPY (EGD); Surgeon:SLOAN GALLARDO  ANA BRIONES; Location: GI    ESOPHAGOSCOPY, GASTROSCOPY, DUODENOSCOPY (EGD), COMBINED  02/13/2012    EXTRACAPSULAR CATARACT EXTRATION WITH INTRAOCULAR LENS IMPLANT  4-20-10, 6-1-10    Rt, Lt    HERNIA REPAIR  1995    Lt inguinal    HERNIA REPAIR  01/01/1995    Lt inguinal    HIP SURGERY      1981, bilat MITZI, revised 2001, 2005    HIP SURGERY  01/01/1981    bilat MITZI, revised 2001, 2005    IR FINE NEEDLE ASPIRATION W ULTRASOUND  04/10/2023    KIDNEY SURGERY  1978 and 1993    transplant    KNEE SURGERY  1983, 1987    bilat TKA    MOHS MICROGRAPHIC PROCEDURE      MOHS MICROGRAPHIC PROCEDURE      ORTHOPEDIC SURGERY      OTHER SURGICAL HISTORY      kidney [id number], 1993    PHACOEMULSIFICATION CLEAR CORNEA WITH STANDARD INTRAOCULAR LENS IMPLANT Right 04/19/2000    PHACOEMULSIFICATION CLEAR CORNEA WITH STANDARD INTRAOCULAR LENS IMPLANT Left 06/01/2000    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, auxiliary spleen    TONSILLECTOMY      TRANSPLANT      VASCULAR SURGERY  1976     Family History   Problem Relation Age of Onset    Cardiovascular Father         AI with valve repair    Hypertension Father     Kidney Disease Father     Other - See Comments Father         AI with valve repair    Anxiety Disorder Maternal Grandmother     Depression Maternal Grandmother     Substance Abuse Maternal Grandfather     Cerebrovascular Disease Maternal Grandfather     Other - See Comments Maternal Grandfather         cerebrovascular disease    Cancer Paternal Grandmother         ovarian ca    Ovarian Cancer Paternal Grandmother     Cerebrovascular Disease Paternal Grandfather     Kidney Disease Paternal Aunt     Kidney Disease Paternal Aunt     Skin Cancer No family hx of     Glaucoma No family hx of     Macular Degeneration No family hx of     Amblyopia No family hx of     Melanoma No family hx of     Diabetes No family hx of          Exam:  /86 (BP Location: Right arm, Patient Position: Sitting, Cuff Size: Adult Regular)   Pulse 51  "  Temp 97.6  F (36.4  C)   Resp 16   Ht 1.727 m (5' 7.99\")   Wt 75.8 kg (167 lb 3.2 oz)   SpO2 97%   BMI 25.43 kg/m    167 lbs 3.2 oz  Physical Exam   The patient is alert, oriented with a clear sensorium.   Skin shows multiple diffuse keratotic lesions no rashes and good turgor.   Head is normocephalic and atraumatic.   Eyes show PERRLA    Ears show normal TMs bilaterally.   Mouth shows clear oral mucosa.   Neck shows no nodes, thyromegaly or bruits.   Back is non tender.  Lungs are clear to percussion and auscultation.   Heart irregular rhythm shows normal S1 and S2 without murmur or gallop.   Abdomen is soft and nontender kidney palpable in the left lower quadrant without other masses or organomegaly.   Genitalia show normal testes. No evidence of inguinal hernia.  Rectal shows much stool small smooth prostate without nodules or masses.  Extremities bilateral bunions L>R show no edema and no evidence of active synovitis.   Neurologic examination shows cranial nerves II-XII intact. Motor shows 5/5 strength. Reflexes are symmetric. Cerebellar testing shows normal tandem gait.  Romberg negative.  EKG reviewed showing sinus bradycardia old inferior MI no acute changes    ASSESSMENT  1 S/P right hip arthroplasty with leg length discrepancy  2 Reactive airway disease on prn inhalers  3 Chronic Hepatitis B on entecavir  4 depression in remission  5 hyperlipidemia on rosuvastatin  6 JULIANE no CPAP  7 S/P kidney transplant  8 CAD S/P CABG 2020 asymptomatic  9 hypertension good control  10 history colon polyps due for colonoscopy in 2025  11 hypotestosterone untreated  12 Alcohol use disorder    Plan  Will reassess his lipids we will renew his rosuvastatin.    This note was completed using Dragon voice recognition software.      Aston Perry MD  General Internal Medicine  [facility]  [phone]        "

## 2024-11-06 NOTE — PROGRESS NOTES
Jerad is a 64 year old that presents in clinic today for the following:     Chief Complaint   Patient presents with    Physical           11/6/2024     8:40 AM   Additional Questions   Roomed by Shelia JUDD   Accompanied by N/A     Screenings as of today     ACT TOTAL SCORE (Goal Greater than or Equal to 20)  23    Fallen 2 or more times in the past year?  No        Shelia Damico at 8:49 AM on 11/6/2024

## 2024-12-04 ENCOUNTER — TELEPHONE (OUTPATIENT)
Dept: GASTROENTEROLOGY | Facility: CLINIC | Age: 64
End: 2024-12-04
Payer: COMMERCIAL

## 2024-12-04 NOTE — TELEPHONE ENCOUNTER
"Endoscopy Scheduling Screen    Have you had any respiratory illness or flu-like symptoms in the last 10 days?  No    What is your communication preference for Instructions and/or Bowel Prep?   MyChart    What insurance is in the chart?  Other:  [facility]    Ordering/Referring Provider: MELO THOMAS    (If ordering provider performs procedure, schedule with ordering provider unless otherwise instructed. )    BMI: Estimated body mass index is 25.43 kg/m  as calculated from the following:    Height as of 11/6/24: 1.727 m (5' 7.99\").    Weight as of 11/6/24: 75.8 kg (167 lb 3.2 oz).     Sedation Ordered  MAC/deep sedation.   BMI<= 45 45 < BMI <= 48 48 < BMI < = 50  BMI > 50   No Restrictions No [facility]  No [facility]  [facility] with exceptions Hospital Only OR Only       Do you have a history of malignant hyperthermia?  No    (Females) Are you currently pregnant?   No     Have you been diagnosed or told you have pulmonary hypertension?   No    Do you have an LVAD?  No    Have you been told you have moderate to severe sleep apnea?  Yes. Do you use a CPAP? No. (RN Review required for scheduling unless scheduling in Hospital.)     Have you been told you have COPD, asthma, or any other lung disease?  Yes     What breathing problems do you have?  Asthma     Do you use home oxygen?  No    Have your breathing problems required an ED visit or hospitalization in the last year?  No.    Do you have any heart conditions?  Yes     In the past year, have you had any hospitalizations for heart related issues including cardiomyopathy, heart attack, or stent placement?  No    Do you have any implantable devices in your body (pacemaker, ICD)?  No    Do you take nitroglycerine?  No    Have you ever had or are you waiting for an organ transplant?  Yes. Have you had or are on on the wait list for a heart/lung transplant? No, may schedule at all facilities except [facility]    Have you had a stroke or transient ischemic attack (TIA aka " "\"mini stroke\" in the last 6 months?   No    Have you been diagnosed with or been told you have cirrhosis of the liver?   No.    Are you currently on dialysis?   No    Do you need assistance transferring?   No    BMI: Estimated body mass index is 25.43 kg/m  as calculated from the following:    Height as of 11/6/24: 1.727 m (5' 7.99\").    Weight as of 11/6/24: 75.8 kg (167 lb 3.2 oz).     Is patients BMI > 40 and scheduling location [facility]?  No    Do you take an injectable or oral medication for weight loss or diabetes (excluding insulin)?  No    Do you take the medication Naltrexone?  Yes Recommended hold time is 3 days. Please consult with you prescribing provider to discuss endoscopy recommendations.    Do you take blood thinners?  Yes, you must contact your prescribing provider for directions on holding or bridging with a different medication.       Prep   Are you currently on dialysis or do you have chronic kidney disease?  Yes ([facility] Prep)    Do you have a diagnosis of diabetes?  No    Do you have a diagnosis of cystic fibrosis (CF)?  No    On a regular basis do you go 3 -5 days between bowel movements?  No    BMI > 40?  No    Preferred Pharmacy:    [id number] [place] [id number] AT [address]  [address] [facility] [address]  Phone: [phone] Fax: [phone]        Final Scheduling Details     Procedure scheduled  Colonoscopy    Surgeon:  WILLIAM     Date of procedure:  6/5/25     Pre-OP / PAC:   No - Not required for this site.    Location  [facility] - Per exclusion criteria.    Sedation   MAC/Deep Sedation - Per order.      Patient Reminders:   You will receive a call from a Nurse to review instructions and health history.  This assessment must be completed prior to your procedure.  Failure to complete the Nurse assessment may result in the procedure being cancelled.      On the day of your procedure, please designate an adult(s) who " can drive you home stay with you for the next 24 hours. The medicines used in the exam will make you sleepy. You will not be able to drive.      You cannot take public transportation, ride share services, or non-medical taxi service without a responsible caregiver.  Medical transport services are allowed with the requirement that a responsible caregiver will receive you at your destination.  We require that drivers and caregivers are confirmed prior to your procedure.

## 2024-12-11 ENCOUNTER — APPOINTMENT (OUTPATIENT)
Dept: CT IMAGING | Facility: HOSPITAL | Age: 64
End: 2024-12-11
Attending: EMERGENCY MEDICINE
Payer: COMMERCIAL

## 2024-12-11 ENCOUNTER — APPOINTMENT (OUTPATIENT)
Dept: RADIOLOGY | Facility: HOSPITAL | Age: 64
End: 2024-12-11
Attending: EMERGENCY MEDICINE
Payer: COMMERCIAL

## 2024-12-11 ENCOUNTER — HOSPITAL ENCOUNTER (EMERGENCY)
Facility: HOSPITAL | Age: 64
Discharge: HOME | End: 2024-12-11
Attending: EMERGENCY MEDICINE
Payer: COMMERCIAL

## 2024-12-11 VITALS
SYSTOLIC BLOOD PRESSURE: 127 MMHG | WEIGHT: 180 LBS | DIASTOLIC BLOOD PRESSURE: 75 MMHG | TEMPERATURE: 97.4 F | HEIGHT: 67 IN | BODY MASS INDEX: 28.25 KG/M2 | OXYGEN SATURATION: 96 % | RESPIRATION RATE: 16 BRPM | HEART RATE: 61 BPM

## 2024-12-11 DIAGNOSIS — S01.511A LIP LACERATION, INITIAL ENCOUNTER: ICD-10-CM

## 2024-12-11 DIAGNOSIS — S06.0X0A CONCUSSION WITHOUT LOSS OF CONSCIOUSNESS, INITIAL ENCOUNTER: ICD-10-CM

## 2024-12-11 DIAGNOSIS — S62.112A CLOSED CHIP FRACTURE OF TRIQUETRUM OF LEFT WRIST, INITIAL ENCOUNTER: ICD-10-CM

## 2024-12-11 DIAGNOSIS — S09.90XA INJURY OF HEAD, INITIAL ENCOUNTER: ICD-10-CM

## 2024-12-11 PROCEDURE — 70450 CT HEAD/BRAIN W/O DYE: CPT

## 2024-12-11 PROCEDURE — 250N000013 HC RX MED GY IP 250 OP 250 PS 637: Performed by: EMERGENCY MEDICINE

## 2024-12-11 PROCEDURE — 73100 X-RAY EXAM OF WRIST: CPT | Mod: LT

## 2024-12-11 PROCEDURE — 250N000011 HC RX IP 250 OP 636: Performed by: EMERGENCY MEDICINE

## 2024-12-11 PROCEDURE — 99284 EMERGENCY DEPT VISIT MOD MDM: CPT | Mod: 25

## 2024-12-11 PROCEDURE — 72125 CT NECK SPINE W/O DYE: CPT

## 2024-12-11 RX ORDER — ONDANSETRON 4 MG/1
4 TABLET, ORALLY DISINTEGRATING ORAL EVERY 8 HOURS PRN
Qty: 10 TABLET | Refills: 0 | Status: SHIPPED | OUTPATIENT
Start: 2024-12-11 | End: 2024-12-14

## 2024-12-11 RX ORDER — METHYLCELLULOSE 4000CPS 30 %
POWDER (GRAM) MISCELLANEOUS ONCE
Status: COMPLETED | OUTPATIENT
Start: 2024-12-11 | End: 2024-12-11

## 2024-12-11 RX ORDER — ONDANSETRON 4 MG/1
4 TABLET, ORALLY DISINTEGRATING ORAL ONCE
Status: COMPLETED | OUTPATIENT
Start: 2024-12-11 | End: 2024-12-11

## 2024-12-11 RX ORDER — ACETAMINOPHEN 325 MG/1
650 TABLET ORAL ONCE
Status: COMPLETED | OUTPATIENT
Start: 2024-12-11 | End: 2024-12-11

## 2024-12-11 RX ADMIN — ACETAMINOPHEN 650 MG: 325 TABLET ORAL at 17:50

## 2024-12-11 RX ADMIN — ONDANSETRON 4 MG: 4 TABLET, ORALLY DISINTEGRATING ORAL at 17:51

## 2024-12-11 ASSESSMENT — ACTIVITIES OF DAILY LIVING (ADL): ADLS_ACUITY_SCORE: 59

## 2024-12-11 ASSESSMENT — COLUMBIA-SUICIDE SEVERITY RATING SCALE - C-SSRS
2. HAVE YOU ACTUALLY HAD ANY THOUGHTS OF KILLING YOURSELF IN THE PAST MONTH?: NO
6. HAVE YOU EVER DONE ANYTHING, STARTED TO DO ANYTHING, OR PREPARED TO DO ANYTHING TO END YOUR LIFE?: NO
1. IN THE PAST MONTH, HAVE YOU WISHED YOU WERE DEAD OR WISHED YOU COULD GO TO SLEEP AND NOT WAKE UP?: NO

## 2024-12-11 ASSESSMENT — ENCOUNTER SYMPTOMS: ABDOMINAL PAIN: 0

## 2024-12-11 NOTE — ED TRIAGE NOTES
Pt arrives via EMS with c/o a fall.  Pt slipped and fell down 3 concrete stairs going into his house.  Pt reports was carrying to much stuff and trying to open door when he just face planted.  Pt denies l;oc, not on thinners just baby asa.  Pt has increased swelling of lips/nose and splitting inside of lips.  Bleeding controlled at this time.  Now with h/a and nauseated.  NO pain with palp but states has neck/back pain.

## 2024-12-11 NOTE — ED PROVIDER NOTES
EMERGENCY DEPARTMENT ENCOUNTER      NAME: Jerad Ross  AGE: 64 year old male  YOB: 1960  MRN: [id number]  EVALUATION DATE & TIME: No admission date for patient encounter.    PCP: Aston Perry    ED PROVIDER: Jignesh Gordon DO      Chief Complaint   Patient presents with    Fall         FINAL IMPRESSION:  1. Injury of head, initial encounter    2. Lip laceration, initial encounter    3. Concussion without loss of consciousness, initial encounter    4. Closed chip fracture of triquetrum of left wrist, initial encounter          ED COURSE & MEDICAL DECISION MAKIN-year-old male presented to the ED for evaluation of a head injury following a mechanical fall.  Patient states that he slipped while carrying in groceries.  Patient hit his head but he denied loss of consciousness.  Patient takes aspirin but no other blood thinning medications.  Patient complained of pain to his face as well as a headache and nausea.  Reported mild left wrist pain as well.  Patient denied any other trauma or injury.  In the ED the patient was hemodynamically stable upon arrival.  He was slightly uncomfortable.  However, he did not appear to be in acute distress.  On exam the patient was noted to have a small laceration on the inside of lower lip.  He also had dried blood in the right nasal passage and swelling over the bridge of his nose.  Patient had mild tenderness to palpation of the cervical spine.      Following his initial evaluation the patient was given ODT Zofran followed by Tylenol.    CT scan of the cervical spine did not show evidence of acute fracture dislocation.   CT scan of the head was nondiagnostic.    Wrist x-ray shows an ossific fragment which could represent a triquetral fracture.  The location of the fragment is consistent with the patient's location of pain.  The patient was placed into a volar splint.    The patient was reevaluated and informed of the imaging results.  The patient  stated that both the headache and nausea had improved with the medications given to him here in the ED.  The patient was offered an absorbable suture for the inner lip laceration.  The patient declined this stating that he feels comfortable letting it heal on its own.  The patient was educated about oral lacerations as well as fractures.  He was also informed that his symptoms likely represent a mild concussion.  The patient was educated on concussions and reassured.  After educating and reassure the patient he felt comfortable returning home.  The patient was sent home with Zofran to use as needed for any further episodes of nausea or vomiting.  He was instructed to follow-up with his primary care provider for reevaluation of his concussion and laceration.  He was instructed to follow-up with orthopedics regarding the wrist fracture.  He was instructed to return back to ED sooner for any worsening dizziness, vomiting, confusion, pain, or any other new or concerning symptoms.      Pertinent Labs & Imaging studies reviewed. (See chart for details)  4:51 PM I met with the patient to gather history and to perform my initial exam. We discussed plans for the ED course, including diagnostic testing and treatment.       At the conclusion of the encounter I discussed the results of all of the tests and the disposition. The questions were answered. The patient or family acknowledged understanding and was agreeable with the care plan.     Medical Decision Making    History:  Supplemental history from: Documented in chart  External Record(s) reviewed: Documented in chart    Work Up:  Chart documentation includes differential considered and any EKGs or imaging independently interpreted by provider, where specified.  In additional to work up documented, I considered the following work up: Documented in chart, if applicable.    External consultation:  Discussion of management with another provider: Documented in chart, if  applicable    Complicating factors:  Care impacted by chronic illness: Chronic Kidney Disease, Heart Disease, and Hypertension  Care affected by social determinants of health: N/A    Disposition considerations: Admission considered. Patient was signed out to the oncoming physician, disposition pending.      PPE worn: n95 mask, goggles    MEDICATIONS GIVEN IN THE EMERGENCY:  Medications   lido-EPINEPHrine-tetracaine (LET) topical gel GEL (has no administration in time range)   methylcellulose powder (has no administration in time range)   ondansetron (ZOFRAN ODT) ODT tab 4 mg (4 mg Oral $Given 12/11/24 1751)   acetaminophen (TYLENOL) tablet 650 mg (650 mg Oral $Given 12/11/24 1750)       NEW PRESCRIPTIONS STARTED AT TODAY'S ER VISIT  New Prescriptions    ONDANSETRON (ZOFRAN ODT) 4 MG ODT TAB    Take 1 tablet (4 mg) by mouth every 8 hours as needed for nausea.          =================================================================    HPI    Patient information was obtained from: patient and EMS     Use of : N/A         Jerad Ross is a 64 year old male with a pertinent history of kidney transplant, hypertension, coronary artery disease who presents to this ED by EMS for evaluation of a fall.     Patient presents to  ED for a fall that occurred today. He states that he fell going up the stairs to his house. He was carrying a lot of groceries and lost his balance. He states that he hit his face, got a bloody nose, cut his lip, and hurt his wrist. He believes he may have tried to brace himself with his wrist and has some current wrist pain. He endorses neck pain, back pain, and nausea. He was not able to get up after the fall due to chronic pain and hip replacements. He was helped by EMS. He has not taken any pain or nausea medications.     Patient denies any chest pain, abdominal pain, leg pain, hip pain, and loss of consciousness. Patient states no other complaints or concerns at this time.         REVIEW OF SYSTEMS   Review of Systems   HENT:  Positive for nosebleeds.         Cut lip.    Cardiovascular:  Negative for chest pain.   Gastrointestinal:  Negative for abdominal pain.   Musculoskeletal:         Wrist pain.         PAST MEDICAL HISTORY:  Past Medical History:   Diagnosis Date    Acute midline low back pain without sciatica     AION (acute ischemic optic neuropathy)     Anemia in chronic renal disease     Arthritis     Avascular necrosis of bones of both hips (H)     s/p bilateral hip replacements    Avascular necrosis of bones of both hips (H)     s/p bilateral hip replacements    Basal cell carcinoma     Chronic hepatitis B (H)     Clostridium difficile colitis     COPD (chronic obstructive pulmonary disease) (H)     Coronary artery disease involving native coronary artery of native heart without angina pectoris 06/17/2014    Coronary angiogram 6/17/14: Severe distal 3-vessel disease involving small vessels, not amenable to PCI or CABG.    CRP elevated     Depression     Depressive disorder     Diverticulosis     Dyslipidemia     Elevated C-reactive protein (CRP)     FSGS (focal segmental glomerulosclerosis)     FSGS (focal segmental glomerulosclerosis)     Gastric ulcer with hemorrhage 02/12/2012    Gastric ulcer with hemorrhage 02/12/2012    GERD (gastroesophageal reflux disease)     Glaucoma     OHTN    Glaucoma     Hemorrhoids     Hemorrhoids     History of blood transfusion     HTN (hypertension)     Hyperlipidemia     Hypertension secondary to other renal disorders     Hypogonadism in male     Immunosuppressed status (H)     Kidney replaced by transplant     focal glomerulosclerosis     Kidney transplanted     focal glomerulosclerosis     NSTEMI (non-ST elevated myocardial infarction) (H) 06/17/2014    NSTEMI (non-ST elevated myocardial infarction) (H) 06/17/2014    JULIANE (obstructive sleep apnea)     Doesn't use CPAP    Paracentral scotoma     LE    Secondary renal hyperparathyroidism (H)      Secondary renal hyperparathyroidism (H)     Septic bursitis     Squamous cell carcinoma     Uncomplicated asthma        PAST SURGICAL HISTORY:  Past Surgical History:   Procedure Laterality Date    APPENDECTOMY      ARTHROPLASTY REVISION HIP Right 6/19/2023    Procedure: RIGHT HIP REVISION;  Surgeon: Juan Mcdonough MD;  Location:  OR    BIOPSY      Cardiac Bypass surgery  06/08/2020    Roberdel's     CATARACT EXTRACTION EXTRACAPSULAR W/ INTRAOCULAR LENS IMPLANTATION Bilateral 4-20-10, 6-1-10    CATARACT IOL, RT/LT  04/19/2000    RE    CATARACT IOL, RT/LT  06/01/2000    LE    COLECTOMY PARTIAL  01/01/1983     10 cm, diverticulitis     COLECTOMY SUBTOTAL  1983    10 cm, diverticulitis    COLONOSCOPY  02/13/2012    Procedure:COLONOSCOPY; Surgeon:SLOAN GALLARDO; Location: GI    COLONOSCOPY N/A 01/22/2020    Procedure: Colonoscopy, With Polypectomy And Biopsy;  Surgeon: Aston Kiran MD;  Location: [facility]    CV CORONARY ANGIOGRAM N/A 06/02/2020    Procedure: Coronary Angiogram;  Surgeon: Alona Florentino MD;  Location: [facility] Cath Lab;  Service: Cardiology    CV CORONARY ANGIOGRAM N/A 09/20/2021    Procedure: Coronary Angiogram;  Surgeon: Adam Agudelo MD;  Location: [facility]    CV LEFT HEART CATHETERIZATION WITHOUT LEFT VENTRICULOGRAM Left 06/02/2020    Procedure: Left Heart Catheterization Without Left Ventriculogram;  Surgeon: Alona Florentino MD;  Location: [facility] Cath Lab;  Service: Cardiology    CV SUPRAVALVULAR AORTOGRAM N/A 09/20/2021    Procedure: Supravalvular Aortagram;  Surgeon: Adam Agudelo MD;  Location: [facility] CV    ESOPHAGOSCOPY, GASTROSCOPY, DUODENOSCOPY (EGD), COMBINED  02/13/2012    Procedure:COMBINED ESOPHAGOSCOPY, GASTROSCOPY, DUODENOSCOPY (EGD); Surgeon:SLOAN GALLARDO; Location: GI    ESOPHAGOSCOPY, GASTROSCOPY, DUODENOSCOPY (EGD), COMBINED  02/13/2012    EXTRACAPSULAR CATARACT EXTRATION WITH INTRAOCULAR LENS  IMPLANT  4-20-10, 6-1-10    Rt, Lt    HERNIA REPAIR  1995    Lt inguinal    HERNIA REPAIR  01/01/1995    Lt inguinal    HIP SURGERY      1981, bilat MITZI, revised 2001, 2005    HIP SURGERY  01/01/1981    bilat MITZI, revised 2001, 2005    IR FINE NEEDLE ASPIRATION W ULTRASOUND  04/10/2023    KIDNEY SURGERY  1978 and 1993    transplant    KNEE SURGERY  1983, 1987    bilat TKA    MOHS MICROGRAPHIC PROCEDURE      MOHS MICROGRAPHIC PROCEDURE      ORTHOPEDIC SURGERY      OTHER SURGICAL HISTORY      kidney [id number], 1993    PHACOEMULSIFICATION CLEAR CORNEA WITH STANDARD INTRAOCULAR LENS IMPLANT Right 04/19/2000    PHACOEMULSIFICATION CLEAR CORNEA WITH STANDARD INTRAOCULAR LENS IMPLANT Left 06/01/2000    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, auxiliary spleen    TONSILLECTOMY      TRANSPLANT      VASCULAR SURGERY  1976           CURRENT MEDICATIONS:    ondansetron (ZOFRAN ODT) 4 MG ODT tab  acetaminophen (TYLENOL) 500 MG tablet  albuterol (PROAIR HFA) 108 (90 Base) MCG/ACT inhaler  aspirin 81 MG EC tablet  budesonide (PULMICORT FLEXHALER) 90 MCG/ACT inhaler  clindamycin (CLEOCIN) 150 MG capsule  entecavir (BARACLUDE) 0.5 MG tablet  FLUoxetine (PROZAC) 20 MG capsule  FLUoxetine (PROZAC) 40 MG capsule  fluticasone-salmeterol (ADVAIR) 250-50 MCG/ACT inhaler  furosemide (LASIX) 20 MG tablet  hydrOXYzine HCl (ATARAX) 10 MG tablet  hydrOXYzine HCl (ATARAX) 25 MG tablet  isosorbide mononitrate (IMDUR) 60 MG 24 hr tablet  latanoprost (XALATAN) 0.005 % ophthalmic solution  metoprolol succinate ER (TOPROL XL) 25 MG 24 hr tablet  Multiple Vitamins-Iron (ONE DAILY MULTIVITAMIN/IRON) TABS  mycophenolate (GENERIC EQUIVALENT) 250 MG capsule  naltrexone (DEPADE/REVIA) 50 MG tablet  nitroGLYcerin (NITROSTAT) 0.4 MG sublingual tablet  Omega-3 Fatty Acids (OMEGA 3 PO)  pantoprazole (PROTONIX) 40 MG EC tablet  polyethylene glycol (MIRALAX) 17 g packet  predniSONE (DELTASONE) 5 MG tablet  rosuvastatin (CRESTOR) 20 MG tablet  tacrolimus (PROTOPIC)  0.1 % external ointment        ALLERGIES:  Allergies   Allergen Reactions    Penicillins Shortness Of Breath and Hives    Keflex [Cephalexin Hcl] Unknown     Patient could not recall reaction    Sulfa Antibiotics Rash    Tetracycline Unknown     Patient could not recall reaction       FAMILY HISTORY:  Family History   Problem Relation Age of Onset    Cardiovascular Father         AI with valve repair    Hypertension Father     Kidney Disease Father     Other - See Comments Father         AI with valve repair    Anxiety Disorder Maternal Grandmother     Depression Maternal Grandmother     Substance Abuse Maternal Grandfather     Cerebrovascular Disease Maternal Grandfather     Other - See Comments Maternal Grandfather         cerebrovascular disease    Cancer Paternal Grandmother         ovarian ca    Ovarian Cancer Paternal Grandmother     Cerebrovascular Disease Paternal Grandfather     Kidney Disease Paternal Aunt     Kidney Disease Paternal Aunt     Skin Cancer No family hx of     Glaucoma No family hx of     Macular Degeneration No family hx of     Amblyopia No family hx of     Melanoma No family hx of     Diabetes No family hx of        SOCIAL HISTORY:   Social History     Socioeconomic History    Marital status:     Number of children: 0   Occupational History     Employer: DISABLED    Occupation:      Employer: [employer]   Tobacco Use    Smoking status: Former     Current packs/day: 0.00     Average packs/day: 1 pack/day for 9.0 years (9.0 ttl pk-yrs)     Types: Cigarettes     Start date: 1980     Quit date: 1988     Years since quittin.9    Smokeless tobacco: Former   Vaping Use    Vaping status: Never Used   Substance and Sexual Activity    Alcohol use: Not Currently     Comment: quit drinking 2023    Drug use: Not Currently     Types: Oxycodone    Sexual activity: Not Currently     Partners: Female   Other Topics Concern    Parent/sibling w/ CABG, MI or  angioplasty before 65F 55M? No    Special Diet No    Exercise Yes     Comment: walks   Social History Narrative    Lives alone with a roommate.  Not mention his wife to me.     Social Drivers of Health     Financial Resource Strain: Low Risk  (11/5/2024)    Financial Resource Strain     Within the past 12 months, have you or your family members you live with been unable to get utilities (heat, electricity) when it was really needed?: No   Food Insecurity: Low Risk  (11/5/2024)    Food Insecurity     Within the past 12 months, did you worry that your food would run out before you got money to buy more?: No     Within the past 12 months, did the food you bought just not last and you didn t have money to get more?: No   Transportation Needs: Low Risk  (11/5/2024)    Transportation Needs     Within the past 12 months, has lack of transportation kept you from medical appointments, getting your medicines, non-medical meetings or appointments, work, or from getting things that you need?: No   Physical Activity: Insufficiently Active (11/5/2024)    Exercise Vital Sign     Days of Exercise per Week: 5 days     Minutes of Exercise per Session: 20 min   Stress: Stress Concern Present (11/5/2024)    [employer] [place] [employer] - Occupational Stress Questionnaire     Feeling of Stress : To some extent   Social Connections: Unknown (11/5/2024)    Social Connection and Isolation Panel [NHANES]     Frequency of Social Gatherings with Friends and Family: More than three times a week   Interpersonal Safety: Low Risk  (11/6/2024)    Interpersonal Safety     Do you feel physically and emotionally safe where you currently live?: Yes     Within the past 12 months, have you been hit, slapped, kicked or otherwise physically hurt by someone?: No     Within the past 12 months, have you been humiliated or emotionally abused in other ways by your partner or ex-partner?: No   Housing Stability: Low Risk  (11/5/2024)    Housing  "Stability     Do you have housing? : Yes     Are you worried about losing your housing?: No       VITALS:  /73   Pulse 61   Temp 97.4  F (36.3  C) (Oral)   Resp 16   Ht 1.702 m (5' 7\")   Wt 81.6 kg (180 lb)   SpO2 96%   BMI 28.19 kg/m      PHYSICAL EXAM    General Presentation: No apparent distress.  ENT: Ears atraumatic. Ear canals clear. No blood or CSF in canals. No hemotympanum or tympanic membrane rupture. No septal hematoma.  Oropharynx clear. 1cm laceration to inside lower lip with oozing noted. Dried blood in right nasal passage. Swelling over bridge of nose.   Eye: Pupils equal round and reactive to light. EOMFI. No conjunctival hemorrhage. No hyphema. Eye lids normal.  Pulmonary: Spontaneous respiration. No respiratory distress. Clear equal breath sounds. Airway patent. Speech is normal. No stridor. Grossly stable chest wall. No crepitus. No chest wall tenderness to palpation noted.  Circulatory: Regular rate and rhythm. No murmurs, rubs, or gallops. Normal capillary refill.   Abdominal: Abdomen soft, non-tender and non-distended. No peritoneal signs. No flank tenderness. Normal bowel sounds. Pelvis stable/non-tender.   Neurologic: Alert and awake. Cranial nerves II-XII grossly intact.  No gross motor deficit. No gross sensory deficit. Normal upper extremity and lower extremity strength. [place] Coma Score 15.  Spine: No bony tenderness to palpation in the thoracic spine, lumbar spine, or sacrum. Tenderness to palpation over upper cervical spine.    Upper extremities:  Full range of motion.   Pulses 2/4 bilateral upper extremities . No abrasions, lacerations, or contusions noted. Minimal tenderness to palpation of dorsal left wrist with full ROM.   Lower extremities:  Full range of motion. No tenderness to palpation.  Pulses 2/4 bilateral lower extremities . No wounds, abrasions, lacerations, or contusions noted.   Skin: Head/scalp non tender. No wounds, abrasions, lacerations, or contusions " of head/scalp, chest, back, abdomen, flank or pelvis.          LAB:  All pertinent labs reviewed and interpreted.  Results for orders placed or performed during the hospital encounter of 12/11/24   Head CT w/o contrast    Impression    IMPRESSION:  1.  No CT evidence for acute intracranial process.  2.  Brain atrophy and presumed chronic microvascular ischemic changes as above.   CT Cervical Spine w/o Contrast    Impression    IMPRESSION:  1.  No fracture or posttraumatic subluxation.   XR Wrist Left 2 Views    Impression    IMPRESSION:   Ossific fragment along the dorsal aspect of the carpus which may represent a triquetral fracture. A prominent dorsal osteophyte could appear similar. Correlate with point tenderness.     Severe first CMC degenerative change with complete joint space loss and bone-on-bone articulation. Diffuse osseous demineralization which limits evaluation for nondisplaced fractures and subtle osseous lesions. Vascular calcifications in the distal   forearm. Benign enostosis in the distal ulna.       RADIOLOGY:  Reviewed all pertinent imaging. Please see official radiology report.  XR Wrist Left 2 Views   Final Result   IMPRESSION:    Ossific fragment along the dorsal aspect of the carpus which may represent a triquetral fracture. A prominent dorsal osteophyte could appear similar. Correlate with point tenderness.       Severe first CMC degenerative change with complete joint space loss and bone-on-bone articulation. Diffuse osseous demineralization which limits evaluation for nondisplaced fractures and subtle osseous lesions. Vascular calcifications in the distal    forearm. Benign enostosis in the distal ulna.      CT Cervical Spine w/o Contrast   Final Result   IMPRESSION:   1.  No fracture or posttraumatic subluxation.      Head CT w/o contrast   Final Result   IMPRESSION:   1.  No CT evidence for acute intracranial process.   2.  Brain atrophy and presumed chronic microvascular ischemic  changes as above.            I, King Evans , am serving as a scribe to document services personally performed by Jignesh Gordon DO based on my observation and the provider's statements to me. I, Jignesh Gordon, attest that King Evans is acting in a scribe capacity, has observed my performance of the services and has documented them in accordance with my direction.    Jignesh Gordon DO  Emergency Medicine  [facility] EMERGENCY DEPARTMENT  [address] [place] [id number]  [phone]       Jignesh Gordon DO  12/11/24 2009

## 2024-12-30 DIAGNOSIS — Z95.1 S/P CABG (CORONARY ARTERY BYPASS GRAFT): ICD-10-CM

## 2024-12-31 DIAGNOSIS — K21.9 GASTROESOPHAGEAL REFLUX DISEASE WITHOUT ESOPHAGITIS: ICD-10-CM

## 2025-01-02 DIAGNOSIS — Z94.0 KIDNEY TRANSPLANTED: ICD-10-CM

## 2025-01-02 RX ORDER — MYCOPHENOLATE MOFETIL 250 MG/1
750 CAPSULE ORAL 2 TIMES DAILY
Qty: 540 CAPSULE | Refills: 0 | Status: SHIPPED | OUTPATIENT
Start: 2025-01-02

## 2025-01-02 RX ORDER — METOPROLOL SUCCINATE 25 MG/1
12.5 TABLET, EXTENDED RELEASE ORAL DAILY
Qty: 45 TABLET | Refills: 2 | Status: SHIPPED | OUTPATIENT
Start: 2025-01-02

## 2025-01-02 NOTE — TELEPHONE ENCOUNTER
Medication Refill  Route to [facility]    Pharmacy Name:   [facility] ([facility]) #[id number] - [place], [place] - [address] Phone: [phone]   Fax: [phone]          Name of Medication: mycophenolate (GENERIC EQUIVALENT) 250 MG capsule    Quantity / Dose: 540 / 250MG

## 2025-01-05 RX ORDER — PANTOPRAZOLE SODIUM 40 MG/1
40 TABLET, DELAYED RELEASE ORAL DAILY
Qty: 90 TABLET | Refills: 3 | Status: SHIPPED | OUTPATIENT
Start: 2025-01-05

## 2025-01-08 ENCOUNTER — OFFICE VISIT (OUTPATIENT)
Dept: OPHTHALMOLOGY | Facility: CLINIC | Age: 65
End: 2025-01-08
Payer: COMMERCIAL

## 2025-01-08 DIAGNOSIS — Z96.1 PSEUDOPHAKIA OF BOTH EYES: ICD-10-CM

## 2025-01-08 DIAGNOSIS — Z86.69 HISTORY OF ANTERIOR ISCHEMIC OPTIC NEUROPATHY: Primary | ICD-10-CM

## 2025-01-08 DIAGNOSIS — H52.4 PRESBYOPIA: ICD-10-CM

## 2025-01-08 DIAGNOSIS — H43.812 POSTERIOR VITREOUS DETACHMENT, LEFT: ICD-10-CM

## 2025-01-08 PROCEDURE — 92014 COMPRE OPH EXAM EST PT 1/>: CPT | Performed by: OPTOMETRIST

## 2025-01-08 PROCEDURE — 92081 LIMITED VISUAL FIELD XM: CPT | Performed by: OPTOMETRIST

## 2025-01-08 PROCEDURE — 92015 DETERMINE REFRACTIVE STATE: CPT | Performed by: OPTOMETRIST

## 2025-01-08 ASSESSMENT — CONF VISUAL FIELD
OS_SUPERIOR_NASAL_RESTRICTION: 0
OS_INFERIOR_TEMPORAL_RESTRICTION: 0
OD_INFERIOR_TEMPORAL_RESTRICTION: 2
OD_INFERIOR_NASAL_RESTRICTION: 2
OD_SUPERIOR_TEMPORAL_RESTRICTION: 1
OS_NORMAL: 1
OS_SUPERIOR_TEMPORAL_RESTRICTION: 0
METHOD: COUNTING FINGERS
OS_INFERIOR_NASAL_RESTRICTION: 0

## 2025-01-08 ASSESSMENT — VISUAL ACUITY
METHOD: SNELLEN - LINEAR
OS_CC: 20/25
CORRECTION_TYPE: GLASSES
OS_CC+: -2
OD_CC: 20/125
OD_CC+: +1

## 2025-01-08 ASSESSMENT — REFRACTION_WEARINGRX
OD_SPHERE: BALANCE
OS_CYLINDER: +2.25
SPECS_TYPE: TRIFOCAL
OS_AXIS: 170
OS_SPHERE: -0.50
OS_ADD: +2.50

## 2025-01-08 ASSESSMENT — CUP TO DISC RATIO
OD_RATIO: 0.5
OS_RATIO: 0.65

## 2025-01-08 ASSESSMENT — TONOMETRY
IOP_METHOD: ICARE
OS_IOP_MMHG: 20
OD_IOP_MMHG: 25

## 2025-01-08 ASSESSMENT — REFRACTION_MANIFEST
OS_ADD: +2.50
OD_SPHERE: BALANCE
OS_CYLINDER: +2.50
OS_AXIS: 170
OS_SPHERE: -1.00

## 2025-01-08 ASSESSMENT — SLIT LAMP EXAM - LIDS
COMMENTS: NORMAL
COMMENTS: NORMAL

## 2025-01-08 ASSESSMENT — EXTERNAL EXAM - RIGHT EYE: OD_EXAM: NORMAL

## 2025-01-08 ASSESSMENT — EXTERNAL EXAM - LEFT EYE: OS_EXAM: NORMAL

## 2025-01-08 NOTE — PROGRESS NOTES
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 Visual field bilateral is 140 degrees.   Plan: [facility]        Discussed findings with patient. Continue monitoring annually with complete exam. Return to clinic sooner with any vision changes.     (H43.812) Posterior vitreous detachment, left  Comment: Stable floaters  Plan: Monitor.     (Z96.1) Pseudophakia of both eyes  Plan: Monitor.     (H52.4) Presbyopia  Plan: REFRACTION [[id number]]        Discussed findings with patient. New spectacle prescription dispensed to patient. Patient is welcome to return to clinic with prolonged adaptation difficulties. No significant Rx changes. Ok to continue with present lenses.       Complete documentation of historical and exam elements from today's encounter can  be found in the full encounter summary report (not reduplicated in this progress  note). I personally obtained the chief complaint(s) and history of present illness. I  confirmed and edited as necessary the review of systems, past medical/surgical  history, family history, social history, and examination findings as documented by  others; and I examined the patient myself. I personally reviewed the relevant tests,  images, and reports as documented above. I formulated and edited as necessary the  assessment and plan and discussed the findings and management plan with the  patient and family.    Kenneth Bella, OD

## 2025-03-07 DIAGNOSIS — F10.21 ALCOHOL USE DISORDER, SEVERE, IN SUSTAINED REMISSION (H): ICD-10-CM

## 2025-03-10 DIAGNOSIS — Z95.1 S/P CABG (CORONARY ARTERY BYPASS GRAFT): Primary | ICD-10-CM

## 2025-03-10 RX ORDER — NALTREXONE HYDROCHLORIDE 50 MG/1
50 TABLET, FILM COATED ORAL DAILY
Qty: 30 TABLET | Refills: 0 | Status: SHIPPED | OUTPATIENT
Start: 2025-03-10

## 2025-03-10 NOTE — TELEPHONE ENCOUNTER
Date of Last Office Visit: 10/21/25  Date of Next Office Visit: None; routing for A to assist pt with scheduling.    No shows since last visit: No  More than one patient-initiated cancellation (with reschedule) since last seen in clinic? No    []Medication refilled per  Medication Refill in Ambulatory Care  policy.  []Scope of Practice: refill request processed by LPN/MA  [x]Medication unable to be refilled by RN due to criteria not met as indicated below:    []Eligibility: has not had a provider visit within last 6 months   [x]Supervision: no future appointment; < 7 days before next appointment   []Compliance: no shows; cancellations; lapse in therapy   []Verification: order discrepancy; may need modification...   [] > 30-day supply request   []Advanced refill request: > 7 days before refill date   []Controlled medication   []Medication not included in policy   []Review: new med; med adjusted <= 30 days; safety alert; requires lab monitoring...   []Other:      Medication(s) requested:     -  naltrexone (DEPADE/REVIA) 50 MG tablet   Date last ordered: 10/21/25  Qty: 30  Refills: 2  Appropriate for refill? Provider to review.            Any Controlled Substance(s)? No      Requested medication(s) verified as identical to current order? Yes    Any lapse in adherence to medication(s) greater than 5 days? Unknown     Additional action taken? routed encounter to provider for review.      Last visit treatment plan:        [facility]     A/P                                                   ASSESSMENT/PLAN  1. Alcohol use disorder, severe, in sustained remission (H)  Stable in sustained remission. Is taking naltrexone 50 mg daily and wants to continue for now.   Remains actively involved in his recovery, continue.   - naltrexone (DEPADE/REVIA) 50 MG tablet; Take 1 tablet (50 mg) by mouth daily.  Dispense: 30 tablet; Refill: 2     2. Generalized anxiety disorder  3. Insomnia, unspecified  type  Reports periods of increase anxiety, worse at night, impacting his sleep. Is taking fluoxetine and hydroxyzine and feels these medications are effective for him.   Has follow up with psychiatry 2/27/2025.   Not interested in therapy, has good coping skills in place.      Orders Placed This Encounter  Medications   naltrexone (DEPADE/REVIA) 50 MG tablet      Sig: Take 1 tablet (50 mg) by mouth daily.      Dispense:  30 tablet      Refill:  2     RTC  Return in about 13 weeks (around 1/20/2025) for Follow up, in person 1100.         Any medication(s) require lab monitoring? No    Mike Beasley RN on 3/10/2025 at 10:12 AM

## 2025-03-12 RX ORDER — ISOSORBIDE MONONITRATE 60 MG/1
60 TABLET, EXTENDED RELEASE ORAL DAILY
Qty: 90 TABLET | Refills: 2 | Status: SHIPPED | OUTPATIENT
Start: 2025-03-12

## 2025-03-12 NOTE — TELEPHONE ENCOUNTER
Last Clinic Visit: [facility]  11/6/24  Always Fail Criteria passed

## 2025-03-26 ENCOUNTER — TELEPHONE (OUTPATIENT)
Dept: INTERNAL MEDICINE | Facility: CLINIC | Age: 65
End: 2025-03-26
Payer: COMMERCIAL

## 2025-03-26 NOTE — TELEPHONE ENCOUNTER
[facility] Call Center    Phone Message    May a detailed message be left on voicemail: yes     Reason for Call: Medication Refill Request    Has the patient contacted the pharmacy for the refill? Yes   Name of medication being requested: rosuvastatin (CRESTOR) 20 MG tablet   Provider who prescribed the medication: PCP  Pharmacy:   [facility] ([facility]) #[id number] - [place], [place] - [address] AT [address]     Date medication is needed: ASAP  are states that patient wants a 90 day supply of this medication as it'll be covered by insurance. Please update prescription and send to pharmacy.

## 2025-03-26 NOTE — TELEPHONE ENCOUNTER
rx avialalbe: [facility] called, they will check w/ pharm.  rosuvastatin (CRESTOR) 20 MG tablet   90 tablet 3 11/6/2024 -- No  Sig - Route: Take 1 tablet (20 mg) by mouth daily. - Oral  [facility] ([facility]) #[id number] - [place], [place] - [address] [facility]  [phone]

## 2025-05-20 ENCOUNTER — TELEPHONE (OUTPATIENT)
Dept: GASTROENTEROLOGY | Facility: CLINIC | Age: 65
End: 2025-05-20
Payer: COMMERCIAL

## 2025-05-20 DIAGNOSIS — B19.10 HEPATITIS B VIRUS INFECTION, UNSPECIFIED CHRONICITY: Primary | ICD-10-CM

## 2025-05-20 RX ORDER — BISACODYL 5 MG/1
TABLET, DELAYED RELEASE ORAL
Qty: 4 TABLET | Refills: 0 | Status: SHIPPED | OUTPATIENT
Start: 2025-05-20

## 2025-05-20 NOTE — TELEPHONE ENCOUNTER
Pre visit planning completed.    Procedure details:    Patient scheduled for Colonoscopy on 06/05/2025.     Arrival time: 0745. Procedure time 0915    Facility location: [facility]; [address], [address], [place] [address]. Check in location: Main entrance at registration desk.  *Disclaimer: Drivers are to check in with patient and stay on campus during procedure.     Sedation type: MAC    Pre op exam needed? No.    Indication for procedure:    B18.1 (ICD-10-CM) - Chronic hepatitis B     Chart review:     Electronic implanted devices? No    Recent diagnosis of diverticulitis within the last 6 weeks? No    Medication review:    Diabetic? No    Anticoagulants? No    Weight loss medication/injectable? No GLP-1 medication per patient's medication list. Nursing to verify with pre-assessment call.    Other medication HOLDING recommendations:  Naltrexone PO: HOLD 3 days before procedure.     Prep for procedure:     Bowel prep recommendation: Standard Golytely. Bowel prep sent to    [facility] #[id number] [place], [place] - [address] AT [facility] [address]  Due to: ESRD    Procedure information and instructions sent via queta Keith RN  Endoscopy Procedure Pre Assessment   [phone] option 3

## 2025-05-20 NOTE — TELEPHONE ENCOUNTER
Attempted to contact patient in order to complete pre assessment questions.     No answer. Left message to return call to [phone] option 3.    Callback communication sent via [employer].    Lorin Rice LPN

## 2025-05-22 NOTE — TELEPHONE ENCOUNTER
Pre assessment completed for upcoming procedure.   (Please see previous telephone encounter notes for complete details)    Procedure details:    Arrival time and facility location reviewed.    Pre op exam needed? No.    Designated  policy reviewed. Instructed to have someone stay 24  hours post procedure.       Medication review:    Medications reviewed. Please see supporting documentation below. Holding recommendations discussed (if applicable).   Naltrexone: HOLD 3 days before procedure.       Prep for procedure:     Procedure prep instructions reviewed.        Any additional information needed:  N/A      Patient verbalized understanding and had no questions or concerns at this time.      Denisa Nielsen RN  Endoscopy Procedure Pre Assessment   [phone] option 3

## 2025-05-26 DIAGNOSIS — F10.21 ALCOHOL USE DISORDER, SEVERE, IN SUSTAINED REMISSION (H): ICD-10-CM

## 2025-05-27 NOTE — TELEPHONE ENCOUNTER
Date of Last Office Visit: 10/21/2024  Date of Next Office Visit: None; routing for A to assist pt with scheduling.    No shows since last visit: Yes  More than one patient-initiated cancellation (with reschedule) since last seen in clinic? Yes    []Medication refilled per  Medication Refill in Ambulatory Care  policy.  []Scope of Practice: refill request processed by LPN/MA  [x]Medication unable to be refilled by RN due to criteria not met as indicated below:    [x]Eligibility: has not had a provider visit within last 6 months   [x]Supervision: no future appointment; < 7 days before next appointment   [x]Compliance: no shows; cancellations; lapse in therapy   []Verification: order discrepancy; may need modification...   [] > 30-day supply request   []Advanced refill request: > 7 days before refill date   []Controlled medication   []Medication not included in policy   []Review: new med; med adjusted <= 30 days; safety alert; requires lab monitoring...   []Other:      Medication(s) requested:     -  naltrexone 50 mg tab  Date last ordered: 30  Qty: 30  Refills: 0  Appropriate for refill? Provider to review.        Any Controlled Substance(s)? No      Requested medication(s) verified as identical to current order? Yes    Any lapse in adherence to medication(s) greater than 5 days? Unknown     Additional action taken? Called to speak with patient to discuss med adherence and scheduling f/up with Sarahy Mullins. No answer. [facility] requesting call back to RN at [phone] and to schedule a f/up.      Last visit treatment plan:   ASSESSMENT/PLAN  1. Alcohol use disorder, severe, in sustained remission (H)  Stable in sustained remission. Is taking naltrexone 50 mg daily and wants to continue for now.   Remains actively involved in his recovery, continue.   - naltrexone (DEPADE/REVIA) 50 MG tablet; Take 1 tablet (50 mg) by mouth daily.  Dispense: 30 tablet; Refill: 2     2. Generalized anxiety disorder  3. Insomnia,  unspecified type  Reports periods of increase anxiety, worse at night, impacting his sleep. Is taking fluoxetine and hydroxyzine and feels these medications are effective for him.   Has follow up with psychiatry 2/27/2025.   Not interested in therapy, has good coping skills in place.           Orders Placed This Encounter   Medications    naltrexone (DEPADE/REVIA) 50 MG tablet       Sig: Take 1 tablet (50 mg) by mouth daily.       Dispense:  30 tablet       Refill:  2         Problem list updated Oct 21, 2024   No problems updated.              PDMP Review           Value Time User     State PDMP site checked  Yes 10/21/2024 12:56 PM Sarahy Mullins, JADON                   RTC  Return in about 13 weeks (around 1/20/2025) for Follow up, in person 1100.    Any medication(s) require lab monitoring? No    Yee Bravo RN on 5/27/2025 at 2:52 PM

## 2025-05-28 RX ORDER — NALTREXONE HYDROCHLORIDE 50 MG/1
50 TABLET, FILM COATED ORAL DAILY
Qty: 30 TABLET | Refills: 0 | Status: SHIPPED | OUTPATIENT
Start: 2025-05-28

## 2025-06-04 ENCOUNTER — ANESTHESIA EVENT (OUTPATIENT)
Dept: GASTROENTEROLOGY | Facility: CLINIC | Age: 65
End: 2025-06-04
Payer: COMMERCIAL

## 2025-06-04 RX ORDER — ONDANSETRON 2 MG/ML
4 INJECTION INTRAMUSCULAR; INTRAVENOUS EVERY 30 MIN PRN
Status: CANCELLED | OUTPATIENT
Start: 2025-06-04

## 2025-06-04 RX ORDER — OXYCODONE HYDROCHLORIDE 10 MG/1
10 TABLET ORAL
Refills: 0 | Status: CANCELLED | OUTPATIENT
Start: 2025-06-04

## 2025-06-04 RX ORDER — ONDANSETRON 4 MG/1
4 TABLET, ORALLY DISINTEGRATING ORAL EVERY 30 MIN PRN
Status: CANCELLED | OUTPATIENT
Start: 2025-06-04

## 2025-06-04 RX ORDER — OXYCODONE HYDROCHLORIDE 5 MG/1
5 TABLET ORAL
Refills: 0 | Status: CANCELLED | OUTPATIENT
Start: 2025-06-04

## 2025-06-04 RX ORDER — DEXAMETHASONE SODIUM PHOSPHATE 4 MG/ML
4 INJECTION, SOLUTION INTRA-ARTICULAR; INTRALESIONAL; INTRAMUSCULAR; INTRAVENOUS; SOFT TISSUE
Status: CANCELLED | OUTPATIENT
Start: 2025-06-04

## 2025-06-04 RX ORDER — NALOXONE HYDROCHLORIDE 0.4 MG/ML
0.1 INJECTION, SOLUTION INTRAMUSCULAR; INTRAVENOUS; SUBCUTANEOUS
Status: CANCELLED | OUTPATIENT
Start: 2025-06-04

## 2025-06-04 ASSESSMENT — COPD QUESTIONNAIRES: COPD: 1

## 2025-06-04 NOTE — ANESTHESIA PREPROCEDURE EVALUATION
Anesthesia Pre-Procedure Evaluation    Patient: Jerad Ross   MRN: [id number] : 1960          Procedure : Procedure(s):  Colonoscopy         Past Medical History:   Diagnosis Date    Acute midline low back pain without sciatica     AION (acute ischemic optic neuropathy)     Anemia in chronic renal disease     Arthritis     Avascular necrosis of bones of both hips (H)     s/p bilateral hip replacements    Avascular necrosis of bones of both hips (H)     s/p bilateral hip replacements    Basal cell carcinoma     Chronic hepatitis B (H)     Clostridium difficile colitis     COPD (chronic obstructive pulmonary disease) (H)     Coronary artery disease involving native coronary artery of native heart without angina pectoris 2014    Coronary angiogram 14: Severe distal 3-vessel disease involving small vessels, not amenable to PCI or CABG.    CRP elevated     Depression     Depressive disorder     Diverticulosis     Dyslipidemia     Elevated C-reactive protein (CRP)     FSGS (focal segmental glomerulosclerosis)     FSGS (focal segmental glomerulosclerosis)     Gastric ulcer with hemorrhage 2012    Gastric ulcer with hemorrhage 2012    GERD (gastroesophageal reflux disease)     Glaucoma     OHTN    Glaucoma     Hemorrhoids     Hemorrhoids     History of blood transfusion     HTN (hypertension)     Hyperlipidemia     Hypertension secondary to other renal disorders     Hypogonadism in male     Immunosuppressed status     Kidney replaced by transplant     focal glomerulosclerosis     Kidney transplanted     focal glomerulosclerosis     NSTEMI (non-ST elevated myocardial infarction) (H) 2014    NSTEMI (non-ST elevated myocardial infarction) (H) 2014    JULIANE (obstructive sleep apnea)     Doesn't use CPAP    Paracentral scotoma     LE    Secondary renal hyperparathyroidism     Secondary renal hyperparathyroidism     Septic bursitis     Squamous cell carcinoma     Uncomplicated asthma        Past Surgical History:   Procedure Laterality Date    APPENDECTOMY      ARTHROPLASTY REVISION HIP Right 6/19/2023    Procedure: RIGHT HIP REVISION;  Surgeon: Juan Mcdonough MD;  Location:  OR    BIOPSY      Cardiac Bypass surgery  06/08/2020    [facility]     CATARACT EXTRACTION EXTRACAPSULAR W/ INTRAOCULAR LENS IMPLANTATION Bilateral 4-20-10, 6-1-10    CATARACT IOL, RT/LT  04/19/2000    RE    CATARACT IOL, RT/LT  06/01/2000    LE    COLECTOMY PARTIAL  01/01/1983     10 cm, diverticulitis     COLECTOMY SUBTOTAL  1983    10 cm, diverticulitis    COLONOSCOPY  02/13/2012    Procedure:COLONOSCOPY; Surgeon:SLOAN GALLARDO; Location: GI    COLONOSCOPY N/A 01/22/2020    Procedure: Colonoscopy, With Polypectomy And Biopsy;  Surgeon: Aston Kiran MD;  Location: [facility]    CV CORONARY ANGIOGRAM N/A 06/02/2020    Procedure: Coronary Angiogram;  Surgeon: Alona Florentino MD;  Location: [facility] Cath Lab;  Service: Cardiology    CV CORONARY ANGIOGRAM N/A 09/20/2021    Procedure: Coronary Angiogram;  Surgeon: Adam Agudelo MD;  Location: [facility]    CV LEFT HEART CATHETERIZATION WITHOUT LEFT VENTRICULOGRAM Left 06/02/2020    Procedure: Left Heart Catheterization Without Left Ventriculogram;  Surgeon: Alona Florentino MD;  Location: [facility] Cath Lab;  Service: Cardiology    CV SUPRAVALVULAR AORTOGRAM N/A 09/20/2021    Procedure: Supravalvular Aortagram;  Surgeon: Adam Agudelo MD;  Location: [facility] CV    ESOPHAGOSCOPY, GASTROSCOPY, DUODENOSCOPY (EGD), COMBINED  02/13/2012    Procedure:COMBINED ESOPHAGOSCOPY, GASTROSCOPY, DUODENOSCOPY (EGD); Surgeon:SLOAN GALLARDO; Location: GI    ESOPHAGOSCOPY, GASTROSCOPY, DUODENOSCOPY (EGD), COMBINED  02/13/2012    EXTRACAPSULAR CATARACT EXTRATION WITH INTRAOCULAR LENS IMPLANT  4-20-10, 6-1-10    Rt, Lt    HERNIA REPAIR  1995    Lt inguinal    HERNIA REPAIR  01/01/1995    Lt inguinal    HIP SURGERY      1981,  bilat MITZI, revised ,     HIP SURGERY  1981    bilat MITZI, revised ,     IR FINE NEEDLE ASPIRATION W ULTRASOUND  04/10/2023    KIDNEY SURGERY   and     transplant    KNEE SURGERY  ,     bilat TKA    MOHS MICROGRAPHIC PROCEDURE      MOHS MICROGRAPHIC PROCEDURE      ORTHOPEDIC SURGERY      OTHER SURGICAL HISTORY      kidney [id number], 1993    PHACOEMULSIFICATION CLEAR CORNEA WITH STANDARD INTRAOCULAR LENS IMPLANT Right 2000    PHACOEMULSIFICATION CLEAR CORNEA WITH STANDARD INTRAOCULAR LENS IMPLANT Left 2000    SPLENECTOMY      leukopenia, auxiliary spleen    TONSILLECTOMY      TRANSPLANT      VASCULAR SURGERY        Allergies   Allergen Reactions    Penicillins Shortness Of Breath and Hives    Keflex [Cephalexin Hcl] Unknown     Patient could not recall reaction    Sulfa Antibiotics Rash    Tetracycline Unknown     Patient could not recall reaction      Social History     Tobacco Use    Smoking status: Former     Current packs/day: 0.00     Average packs/day: 1 pack/day for 9.0 years (9.0 ttl pk-yrs)     Types: Cigarettes     Start date: 1980     Quit date: 1988     Years since quittin.4    Smokeless tobacco: Former   Substance Use Topics    Alcohol use: Not Currently     Comment: quit drinking 2023      Wt Readings from Last 1 Encounters:   24 81.6 kg (180 lb)        Anesthesia Evaluation            ROS/MED HX  ENT/Pulmonary: Comment: AION (acute ischemic optic neuropathy)    (+) sleep apnea, doesn't use CPAP,                   Intermittent, asthma  Treatment: Inhaler prn,   COPD,              Neurologic:       Cardiovascular:     (+) Dyslipidemia hypertension- -  CAD - past MI CABG-date: . -                                 Previous cardiac testing     METS/Exercise Tolerance:     Hematologic:       Musculoskeletal:       GI/Hepatic: Comment: Diverticulosis    (+) GERD,          hepatitis type B,        Renal/Genitourinary:      (+) renal disease,    Pt has history of transplant, date: 1993,        Endo:       Psychiatric/Substance Use:     (+) psychiatric history depression       Infectious Disease:       Malignancy:       Other:              Physical Exam  Airway  Mallampati: III  TM distance: >3 FB  Neck ROM: full  Upper bite lip test: I  Mouth opening: >= 4 cm    Cardiovascular - normal exam   Dental   (+) Multiple crowns, permanent bridges      Pulmonary - normal exam      Neurological - normal exam  He appears awake, alert and oriented x3.    Other Findings       OUTSIDE LABS:  CBC:   Lab Results   Component Value Date    WBC 8.2 10/31/2024    WBC 7.2 10/10/2024    HGB 13.6 10/31/2024    HGB 13.3 10/10/2024    HCT 42.7 10/31/2024    HCT 43.4 10/10/2024     10/31/2024     10/10/2024     BMP:   Lab Results   Component Value Date     10/31/2024     10/10/2024    POTASSIUM 4.1 10/31/2024    POTASSIUM 4.7 10/10/2024    CHLORIDE 103 10/31/2024    CHLORIDE 103 10/10/2024    CO2 27 10/31/2024    CO2 29 10/10/2024    BUN 11.2 10/31/2024    BUN 14.7 10/10/2024    CR 0.84 10/31/2024    CR 0.82 10/10/2024    GLC 85 10/31/2024    GLC 92 10/10/2024     COAGS:   Lab Results   Component Value Date    PTT 27 06/19/2023    INR 1.03 10/31/2024    FIBR 284 06/08/2020     POC:   Lab Results   Component Value Date    BGM 83 11/08/2018     HEPATIC:   Lab Results   Component Value Date    ALBUMIN 3.8 10/31/2024    PROTTOTAL 6.6 10/31/2024    ALT 27 10/31/2024    AST 35 10/31/2024    ALKPHOS 70 10/31/2024    BILITOTAL 1.0 10/31/2024     OTHER:   Lab Results   Component Value Date    PH 7.33 (L) 06/09/2020    LACT 1.1 11/04/2018    A1C 6.0 (H) 10/21/2022    HEMA 9.6 10/31/2024    PHOS 2.9 11/05/2018    MAG 1.9 09/01/2023    LIPASE 26 06/12/2023    TSH 5.33 (H) 08/20/2023    T4 0.89 01/08/2009    CRP 6.9 03/09/2021    SED 21 (H) 04/17/2023       Anesthesia Plan    ASA Status:  3      NPO Status: NPO Appropriate   Anesthesia Type:  MAC.  Airway: natural airway.  Induction: intravenous.  Maintenance: TIVA.   Techniques and Equipment:       - Monitoring Plan: standard ASA monitoring     Consents    Anesthesia Plan(s) and associated risks, benefits, and realistic alternatives discussed. Questions answered and patient/representative(s) expressed understanding.     - Discussed: anesthesiologist, fellow     - Discussed with:  Patient        - Pt is DNR/DNI Status: no DNR     Blood Consent:      - Discussed with: not discussed.     Postoperative Care    Pain management: multimodal analgesia.     Comments:                   Krista Pantoja MD    I have reviewed the pertinent notes and labs in the chart from the past 30 days and (re)examined the patient.  Any updates or changes from those notes are reflected in this note.    Clinically Significant Risk Factors Present on Admission                   # Hypertension: Noted on problem list

## 2025-06-05 ENCOUNTER — ANESTHESIA (OUTPATIENT)
Dept: GASTROENTEROLOGY | Facility: CLINIC | Age: 65
End: 2025-06-05
Payer: COMMERCIAL

## 2025-06-05 ENCOUNTER — HOSPITAL ENCOUNTER (OUTPATIENT)
Facility: CLINIC | Age: 65
Discharge: HOME | End: 2025-06-05
Attending: INTERNAL MEDICINE | Admitting: INTERNAL MEDICINE
Payer: COMMERCIAL

## 2025-06-05 VITALS
RESPIRATION RATE: 16 BRPM | DIASTOLIC BLOOD PRESSURE: 71 MMHG | OXYGEN SATURATION: 95 % | TEMPERATURE: 97.9 F | HEART RATE: 68 BPM | SYSTOLIC BLOOD PRESSURE: 110 MMHG

## 2025-06-05 DIAGNOSIS — F10.11 ALCOHOL ABUSE, IN REMISSION: Primary | ICD-10-CM

## 2025-06-05 PROBLEM — D12.6 COLON ADENOMA: Status: ACTIVE | Noted: 2025-06-05

## 2025-06-05 LAB — COLONOSCOPY: NORMAL

## 2025-06-05 PROCEDURE — G0121 COLON CA SCRN NOT HI RSK IND: HCPCS | Performed by: INTERNAL MEDICINE

## 2025-06-05 PROCEDURE — 370N000017 HC ANESTHESIA TECHNICAL FEE, PER MIN: Performed by: INTERNAL MEDICINE

## 2025-06-05 PROCEDURE — 250N000011 HC RX IP 250 OP 636: Performed by: NURSE ANESTHETIST, CERTIFIED REGISTERED

## 2025-06-05 PROCEDURE — 45378 DIAGNOSTIC COLONOSCOPY: CPT | Performed by: INTERNAL MEDICINE

## 2025-06-05 PROCEDURE — 258N000003 HC RX IP 258 OP 636: Performed by: NURSE ANESTHETIST, CERTIFIED REGISTERED

## 2025-06-05 RX ORDER — ONDANSETRON 4 MG/1
4 TABLET, ORALLY DISINTEGRATING ORAL EVERY 6 HOURS PRN
Status: CANCELLED | OUTPATIENT
Start: 2025-06-05

## 2025-06-05 RX ORDER — NALOXONE HYDROCHLORIDE 0.4 MG/ML
0.2 INJECTION, SOLUTION INTRAMUSCULAR; INTRAVENOUS; SUBCUTANEOUS
Status: CANCELLED | OUTPATIENT
Start: 2025-06-05

## 2025-06-05 RX ORDER — PROPOFOL 10 MG/ML
INJECTION, EMULSION INTRAVENOUS CONTINUOUS PRN
Status: DISCONTINUED | OUTPATIENT
Start: 2025-06-05 | End: 2025-06-05

## 2025-06-05 RX ORDER — PROCHLORPERAZINE MALEATE 10 MG
10 TABLET ORAL EVERY 6 HOURS PRN
Status: CANCELLED | OUTPATIENT
Start: 2025-06-05

## 2025-06-05 RX ORDER — LIDOCAINE 40 MG/G
CREAM TOPICAL
Status: DISCONTINUED | OUTPATIENT
Start: 2025-06-05 | End: 2025-06-05 | Stop reason: HOSPADM

## 2025-06-05 RX ORDER — ONDANSETRON 2 MG/ML
4 INJECTION INTRAMUSCULAR; INTRAVENOUS EVERY 6 HOURS PRN
Status: CANCELLED | OUTPATIENT
Start: 2025-06-05

## 2025-06-05 RX ORDER — FLUMAZENIL 0.1 MG/ML
0.2 INJECTION, SOLUTION INTRAVENOUS
Status: CANCELLED | OUTPATIENT
Start: 2025-06-05 | End: 2025-06-05

## 2025-06-05 RX ORDER — SODIUM CHLORIDE, SODIUM LACTATE, POTASSIUM CHLORIDE, CALCIUM CHLORIDE 600; 310; 30; 20 MG/100ML; MG/100ML; MG/100ML; MG/100ML
INJECTION, SOLUTION INTRAVENOUS CONTINUOUS PRN
Status: DISCONTINUED | OUTPATIENT
Start: 2025-06-05 | End: 2025-06-05

## 2025-06-05 RX ORDER — NALOXONE HYDROCHLORIDE 0.4 MG/ML
0.4 INJECTION, SOLUTION INTRAMUSCULAR; INTRAVENOUS; SUBCUTANEOUS
Status: CANCELLED | OUTPATIENT
Start: 2025-06-05

## 2025-06-05 RX ORDER — SODIUM CHLORIDE, SODIUM LACTATE, POTASSIUM CHLORIDE, CALCIUM CHLORIDE 600; 310; 30; 20 MG/100ML; MG/100ML; MG/100ML; MG/100ML
INJECTION, SOLUTION INTRAVENOUS CONTINUOUS
Status: DISCONTINUED | OUTPATIENT
Start: 2025-06-05 | End: 2025-06-05 | Stop reason: HOSPADM

## 2025-06-05 RX ADMIN — PHENYLEPHRINE HYDROCHLORIDE 100 MCG: 10 INJECTION INTRAVENOUS at 09:26

## 2025-06-05 RX ADMIN — PHENYLEPHRINE HYDROCHLORIDE 150 MCG: 10 INJECTION INTRAVENOUS at 09:31

## 2025-06-05 RX ADMIN — PROPOFOL 200 MCG/KG/MIN: 10 INJECTION, EMULSION INTRAVENOUS at 09:22

## 2025-06-05 RX ADMIN — SODIUM CHLORIDE, SODIUM LACTATE, POTASSIUM CHLORIDE, AND CALCIUM CHLORIDE: .6; .31; .03; .02 INJECTION, SOLUTION INTRAVENOUS at 09:20

## 2025-06-05 ASSESSMENT — ACTIVITIES OF DAILY LIVING (ADL)
ADLS_ACUITY_SCORE: 59

## 2025-06-05 NOTE — ANESTHESIA POSTPROCEDURE EVALUATION
Patient: Jerad Ross    Procedure: Procedure(s):  Colonoscopy       Anesthesia Type:  MAC    Note:  Disposition: Outpatient   Postop Pain Control: Uneventful            Sign Out: Well controlled pain   PONV: No   Neuro/Psych: Uneventful            Sign Out: Acceptable/Baseline neuro status   Airway/Respiratory: Uneventful            Sign Out: Acceptable/Baseline resp. status   CV/Hemodynamics: Uneventful            Sign Out: Acceptable CV status; No obvious hypovolemia; No obvious fluid overload   Other NRE: NONE   DID A NON-ROUTINE EVENT OCCUR? No           Last vitals:  Vitals Value Taken Time   /76 06/05/25 10:00   Temp     Pulse     Resp     SpO2 97 % 06/05/25 10:05   Vitals shown include unfiled device data.    Electronically Signed By: Krista Pantoja MD  June 5, 2025  10:06 AM

## 2025-06-05 NOTE — ANESTHESIA CARE TRANSFER NOTE
Patient: Jerad Ross    Procedure: Procedure(s):  Colonoscopy       Diagnosis: Chronic hepatitis B (H) [B18.1]  Diagnosis Additional Information: No value filed.    Anesthesia Type:   MAC     Note:    Oropharynx: oropharynx clear of all foreign objects and spontaneously breathing  Level of Consciousness: awake  Oxygen Supplementation: room air    Independent Airway: airway patency satisfactory and stable  Dentition: dentition unchanged  Vital Signs Stable: post-procedure vital signs reviewed and stable  Report to RN Given: handoff report given  Patient transferred to: Phase II    Handoff Report: Identifed the Patient, Identified the Reponsible Provider, Reviewed the pertinent medical history, Discussed the surgical course, Reviewed Intra-OP anesthesia mangement and issues during anesthesia, Set expectations for post-procedure period and Allowed opportunity for questions and acknowledgement of understanding  Vitals:  Vitals Value Taken Time   BP     Temp     Pulse     Resp     SpO2         Electronically Signed By: RONALDO Hopkins CRNA  June 5, 2025  9:42 AM

## 2025-06-05 NOTE — OR NURSING
Dr. Claros spoke with pt regarding procedure findings, and follow up. All questions answered. Discharge instructions reviewed with pt. Pt stable upon discharge via w/c with .

## 2025-06-05 NOTE — OR NURSING
Pt had colonoscopy under MAC. Pt tolerated procedure well. Pt taken to 3C recovery with all belongings. Procedural report to 3C RN.

## 2025-06-07 ASSESSMENT — ANXIETY QUESTIONNAIRES
IF YOU CHECKED OFF ANY PROBLEMS ON THIS QUESTIONNAIRE, HOW DIFFICULT HAVE THESE PROBLEMS MADE IT FOR YOU TO DO YOUR WORK, TAKE CARE OF THINGS AT HOME, OR GET ALONG WITH OTHER PEOPLE: SOMEWHAT DIFFICULT
2. NOT BEING ABLE TO STOP OR CONTROL WORRYING: SEVERAL DAYS
8. IF YOU CHECKED OFF ANY PROBLEMS, HOW DIFFICULT HAVE THESE MADE IT FOR YOU TO DO YOUR WORK, TAKE CARE OF THINGS AT HOME, OR GET ALONG WITH OTHER PEOPLE?: SOMEWHAT DIFFICULT
5. BEING SO RESTLESS THAT IT IS HARD TO SIT STILL: SEVERAL DAYS
GAD7 TOTAL SCORE: 6
6. BECOMING EASILY ANNOYED OR IRRITABLE: NOT AT ALL
5. BEING SO RESTLESS THAT IT IS HARD TO SIT STILL: SEVERAL DAYS
2. NOT BEING ABLE TO STOP OR CONTROL WORRYING: SEVERAL DAYS
3. WORRYING TOO MUCH ABOUT DIFFERENT THINGS: SEVERAL DAYS
3. WORRYING TOO MUCH ABOUT DIFFERENT THINGS: SEVERAL DAYS
GAD7 TOTAL SCORE: 6
4. TROUBLE RELAXING: SEVERAL DAYS
IF YOU CHECKED OFF ANY PROBLEMS ON THIS QUESTIONNAIRE, HOW DIFFICULT HAVE THESE PROBLEMS MADE IT FOR YOU TO DO YOUR WORK, TAKE CARE OF THINGS AT HOME, OR GET ALONG WITH OTHER PEOPLE: SOMEWHAT DIFFICULT
GAD7 TOTAL SCORE: 6
1. FEELING NERVOUS, ANXIOUS, OR ON EDGE: SEVERAL DAYS
4. TROUBLE RELAXING: SEVERAL DAYS
6. BECOMING EASILY ANNOYED OR IRRITABLE: NOT AT ALL
GAD7 TOTAL SCORE: 6
7. FEELING AFRAID AS IF SOMETHING AWFUL MIGHT HAPPEN: SEVERAL DAYS
7. FEELING AFRAID AS IF SOMETHING AWFUL MIGHT HAPPEN: SEVERAL DAYS
1. FEELING NERVOUS, ANXIOUS, OR ON EDGE: SEVERAL DAYS
8. IF YOU CHECKED OFF ANY PROBLEMS, HOW DIFFICULT HAVE THESE MADE IT FOR YOU TO DO YOUR WORK, TAKE CARE OF THINGS AT HOME, OR GET ALONG WITH OTHER PEOPLE?: SOMEWHAT DIFFICULT

## 2025-06-09 ENCOUNTER — VIRTUAL VISIT (OUTPATIENT)
Dept: ADDICTION MEDICINE | Facility: CLINIC | Age: 65
End: 2025-06-09
Payer: COMMERCIAL

## 2025-06-09 DIAGNOSIS — F10.90 ALCOHOL USE DISORDER: Primary | ICD-10-CM

## 2025-06-09 DIAGNOSIS — F41.1 GENERALIZED ANXIETY DISORDER: ICD-10-CM

## 2025-06-09 RX ORDER — NALTREXONE HYDROCHLORIDE 50 MG/1
50 TABLET, FILM COATED ORAL DAILY
Qty: 30 TABLET | Refills: 2 | Status: SHIPPED | OUTPATIENT
Start: 2025-06-09

## 2025-06-09 ASSESSMENT — PATIENT HEALTH QUESTIONNAIRE - PHQ9
SUM OF ALL RESPONSES TO PHQ QUESTIONS 1-9: 6
10. IF YOU CHECKED OFF ANY PROBLEMS, HOW DIFFICULT HAVE THESE PROBLEMS MADE IT FOR YOU TO DO YOUR WORK, TAKE CARE OF THINGS AT HOME, OR GET ALONG WITH OTHER PEOPLE: SOMEWHAT DIFFICULT
SUM OF ALL RESPONSES TO PHQ QUESTIONS 1-9: 6

## 2025-06-09 ASSESSMENT — PAIN SCALES - GENERAL: PAINLEVEL_OUTOF10: MODERATE PAIN (4)

## 2025-06-09 NOTE — NURSING NOTE
Is the patient currently in the state of [place]? YES    Current patient location: [address] [place] [address]    Visit mode:Video    If the visit is dropped, the patient can be reconnected by: VIDEO VISIT: Text to cell phone:   Telephone Information:   Mobile [phone]       Will anyone else be joining the visit? No  (If patient encounters technical issues they should call [phone])    Are changes needed to the allergy or medication list? No    Are refills needed on medications prescribed by this physician? No    Rooming Documentation: Questionnaire(s) completed.    Reason for visit: RECHJEREMY Mcdowell

## 2025-06-09 NOTE — PROGRESS NOTES
Virtual Visit Details    Type of service:  Video Visit   Video Start Time: 1605  Video End Time:4:19 PM    Originating Location (pt. Location): Home    Distant Location (provider location):  On-site  Platform used for Video Visit: [facility]    A/P                                                    ASSESSMENT/PLAN    1. Alcohol use disorder (Primary)  Overall stable. Reports a brief return to alcohol use, drinking for 1 day in January following sustained remission since 8/2023. He may have missed naltrexone doses prior to drinking. Alcohol use triggered by stress of divorce and chronic pain.   Continue naltrexone 50 mg daily  Continue recovery and self care activities.   - naltrexone (DEPADE/REVIA) 50 MG tablet; Take 1 tablet (50 mg) by mouth daily.  Dispense: 30 tablet; Refill: 2    2. Generalized anxiety disorder  Overall stable. Is taking fluoxetine 60 mg daily and hydroxyzine prn;  is scheduled to see psychiatry last this week for follow up.        Orders Placed This Encounter   Medications    naltrexone (DEPADE/REVIA) 50 MG tablet     Sig: Take 1 tablet (50 mg) by mouth daily.     Dispense:  30 tablet     Refill:  2       Problem list updated Oct 21, 2024   No problems updated.          PDMP Review         Value Time User    State PDMP site checked  Yes 10/21/2024 12:56 PM Sarahy Mullins CNP              RTC  Return in about 14 weeks (around 9/15/2025) for Follow up, using a video visit 1600.      Counseled the patient on the importance of having a recovery program in addition to medication to manage recovery.  Components include avoiding isolating, having willingness to change, avoiding triggers and managing cravings. Encouraged having some type of sober network and practicing honesty with trusted support person(s). Encouraged other services such as counseling, 12 step or other self-help organizations.            SUBJECTIVE                                         "            Jerad Ross is a 65 year old male with history of HTN, JULIANE, , CAD s/p CABG x3 9/2020, depression with anxiety, and alcohol use who presents for follow up.      Brief history: Initial visit 8/14/23. Jerad  is requesting a chemical health assessment to see if he \"should go to treatment of if AA is enough\". He reports that he began drinking 8 shots of whiskey 1-3 times weekly for past 4 months. States his depression and anxiety as well as opiate withdrawal triggered use escalating use. Has been prescribed opioid pain medication on/off since 3/2023 due to right hip pain 2/2 avascular necrosis which was revised 6/2023 and admits to taking more than prescribed on occasion and states that he realized he was taking them sometimes due to his emotions versus physical pain. He has not taken an opioid pain medication for past 3 weeks. States if he ever requires opioid pain medication in future, prefers to be tapered off.      He had a period of 4 years 8825-7154 of total abstinence from alcohol States he went to treatment for \"sexual compulsivity\" followed by a \"sober house\" and he just maintained abstinence after leaving sober [facility]. States he went to sober house because he needed somewhere to go after leaving the [facility]. He is attending a 12 step group for his sexual compulsions. Has recently started attending AA as well.      Remote history of renal failure as an adolescent. Was on dialysis at age 16, and s/p kidney transplant at age 18. States he contracted hepatitis B from dialysis,      Substance Use History:   \"Have you ever had any history with [...] use?\" And \"When was your last use?  ALCOHOL - Drinks 8 shots of whiskey 1-3 times weekly for past 4 months, last drink 8/5/23.   CANNABIS - Denies  PRESCRIPTION STIMULANTS (includes Ritalin, Adderall, Vyvanse) - Denies  COCAINE/CRACK - Denies  METH/AMPHETAMINES (includes ecstacy, MDMA/connie) - Denies  OPIATES - Prescribed opiate pain medication on/off since " "3/2023 due to right hip pain with revision 6/19/2023. States he took as prescribed mostly, but does admit to taking more than  prescribed on a few occasions. He needs to be tapered off of opioids in future as he did experience opiate w/d and began drinking to help with symptoms. No longer has prescription for opioids.   BENZODIAZEPINES (includes Ativan, Klonopin, Xanax) - Denies  KRATOM (mild opioid and stimulant effects) - Denies  KETAMINE - Denies  HALLUCINOGENS (includes DXM) - Denies  BEHAVIORAL (Gambling, Eating d/o, Compulsivity) - Sexual compulsivirty  History of treatment - Yes the [facility], \"sober house\", and 12 step  NICOTINE  Cigarettes: Denies      TODAY'S VISIT  HPI Jun 9, 2025    Spent time in [place] for 4 months with family. Came back to [place] in May.   Still taking naltrexone  Did have a slip on January 15, new sober date is 1/16/25. Drank for one night. Its possible that he may have missed his naltrexone the week of his use but is not sure.   Was experiencing increased stress is going through a divorce from his wife. Is experiencing some ongoing pain to his right hip. Things he is experiencing some sciatic pain.   Is s/p a right hip revision which caused his his right LE to be longer than the left which has caused mobility issues.   Still going to meetings and talking with a sponsor.  Things are going pretty well overall now psychologically and spiritually.   Sees [facility] for psychiatry and is also doing therapy.     Recent HPI Details: 10/24/24  1. Alcohol use disorder, severe, in sustained remission (H)  Stable in sustained remission. Is taking naltrexone 50 mg daily and wants to continue for now.   Remains actively involved in his recovery, continue.   - naltrexone (DEPADE/REVIA) 50 MG tablet; Take 1 tablet (50 mg) by mouth daily.  Dispense: 30 tablet; Refill: 2    2. Generalized anxiety disorder  3. Insomnia, unspecified type  Reports periods of increase anxiety, worse at night, impacting his " sleep. Is taking fluoxetine and hydroxyzine and feels these medications are effective for him.   Has follow up with psychiatry 2/27/2025.   Not interested in therapy, has good coping skills in place.            10/21/2024    10:45 AM 2/3/2025    12:40 PM 6/9/2025     3:48 PM   PHQ   PHQ-9 Total Score 3 13  6    Q9: Thoughts of better off dead/self-harm past 2 weeks Not at all Several days Not at all    F/U: Thoughts of suicide or self-harm  No    F/U: Safety concerns  No        Patient-reported    Proxy-reported       OBJECTIVE                                                    PHYSICAL EXAM:  There were no vitals taken for this visit.      GENERAL: healthy, alert and no distress  RESP: no audible wheeze, cough.  No increased work of breathing.  Able to speak fully in complete sentences.  PSYCH: mentation appears normal, affect normal/bright, judgement and insight intact, normal speech      LAB      HISTORY                                                    Problem list reviewed & adjusted, as indicated.  Patient Active Problem List   Diagnosis    BCC (basal cell carcinoma), trunk    JULIANE (obstructive sleep apnea)    HTN, kidney transplant related    Coronary arteriosclerosis due to lipid rich plaque    NSTEMI (non-ST elevated myocardial infarction) (H)    Depression    Diverticulosis    Hemorrhoids    Kidney replaced by transplant    Basal cell carcinoma    Squamous cell carcinoma    Dyslipidemia    CRP elevated    Glaucoma    AION (acute ischemic optic neuropathy)    Hip pain    Inflamed seborrheic keratosis    Intertrigo    Chronic hepatitis B (H)    Immunosuppression    Hypogonadism in male    GERD (gastroesophageal reflux disease)    Aftercare following organ transplant    Acute midline low back pain without sciatica    Septic bursitis    CAD -- S/P CABG x 3 in 2020    HTN (hypertension)    End stage renal disease (H)    Hip pain, right    Avascular necrosis of bones of both hips (H)    Chest pain, Probably  chest wall in origin    Coronary artery disease of native artery of native heart with stable angina pectoris    Failed total hip arthroplasty, sequela    Generalized muscle weakness    Generalized anxiety disorder    Transient hypotension    Alcohol abuse, in remission    Hypoxia    Multiple subsegmental pulmonary emboli without acute cor pulmonale (H)    Hypomagnesemia    Nonadherence to medication    Pure hypercholesterolemia    Colon adenoma         MEDICATION LIST (prior to visit)  Current Outpatient Medications   Medication Sig Dispense Refill    acetaminophen (TYLENOL) 500 MG tablet Take 1,000 mg by mouth 3 times daily as needed for mild pain      albuterol (PROAIR HFA) 108 (90 Base) MCG/ACT inhaler Inhale 2 puffs into the lungs every 6 hours as needed for shortness of breath / dyspnea or wheezing 1 g 11    aspirin 81 MG EC tablet Take 1 tablet (81 mg) by mouth daily      bisacodyl (DULCOLAX) 5 MG EC tablet Take 2 tablets at 3 pm the day before your procedure. If your procedure is before 11 am, take 2 additional tablets at 11 pm. If your procedure is after 11 am, take 2 additional tablets at 6 am. For additional instructions refer to your colonoscopy prep instructions. 4 tablet 0    budesonide (PULMICORT FLEXHALER) 90 MCG/ACT inhaler Inhale 2 puffs into the lungs 2 times daily as needed (shortness of breath)      clindamycin (CLEOCIN) 150 MG capsule TAKE 2 CAPSULES BY MOUTH 1 HOUR PRIOR TO DENTAL APPOINTMENT. TAKE 1 CAPSULE BY MOUTH EVERY 6 HOURS AFTER APPOINTMENT      entecavir (BARACLUDE) 0.5 MG tablet Take 1 tablet (0.5 mg) by mouth daily. 90 tablet 3    FLUoxetine (PROZAC) 20 MG capsule Take 1 capsule (20 mg) by mouth daily. With 40mg for a total daily dose of 60mg 90 capsule 0    FLUoxetine (PROZAC) 40 MG capsule Take 1 capsule (40 mg) by mouth daily. 90 capsule 0    fluticasone-salmeterol (ADVAIR) 250-50 MCG/ACT inhaler Inhale 1 puff into the lungs 2 times daily 180 each 3    furosemide (LASIX) 20 MG  tablet Take 1 tablet (20 mg) by mouth daily as needed (fluid retention) 90 tablet 1    hydrOXYzine HCl (ATARAX) 10 MG tablet Take 1 tablet (10 mg) by mouth daily as needed for anxiety 30 tablet 1    hydrOXYzine HCl (ATARAX) 25 MG tablet Take 1 tablet (25 mg) by mouth nightly as needed for anxiety (sleep) 90 tablet 1    isosorbide mononitrate (IMDUR) 60 MG 24 hr tablet Take 1 tablet (60 mg) by mouth daily. 90 tablet 2    latanoprost (XALATAN) 0.005 % ophthalmic solution Place 1 drop into both eyes At Bedtime 2.5 mL 11    metoprolol succinate ER (TOPROL XL) 25 MG 24 hr tablet Take 0.5 tablets (12.5 mg) by mouth daily. 45 tablet 2    Multiple Vitamins-Iron (ONE DAILY MULTIVITAMIN/IRON) TABS Take 1 tablet by mouth daily      mycophenolate (GENERIC EQUIVALENT) 250 MG capsule Take 3 capsules (750 mg) by mouth 2 times daily. 540 capsule 0    naltrexone (DEPADE/REVIA) 50 MG tablet Take 1 tablet (50 mg) by mouth daily. 30 tablet 2    nitroGLYcerin (NITROSTAT) 0.4 MG sublingual tablet Place 1 tablet (0.4 mg) under the tongue every 5 minutes as needed for chest pain 20 tablet 3    Omega-3 Fatty Acids (OMEGA 3 PO) Take 1 capsule by mouth daily Dose unknown      pantoprazole (PROTONIX) 40 MG EC tablet TAKE 1 TABLET BY MOUTH EVERY DAY 90 tablet 3    polyethylene glycol (GOLYTELY) 236 g suspension The night before the exam at 6 pm drink an 8-ounce glass every 15 minutes until the jug is half empty. If you arrive before 11 AM: Drink the other half of the Golytely jug at 11 PM night before procedure. If you arrive after 11 AM: Drink the other half of the Golytely jug at 6 AM day of procedure. For additional instructions refer to your colonoscopy prep instructions. 4000 mL 0    polyethylene glycol (MIRALAX) 17 g packet Take 17 g by mouth daily as needed       predniSONE (DELTASONE) 5 MG tablet Take 1 tablet (5 mg) by mouth daily 90 tablet 3    rosuvastatin (CRESTOR) 20 MG tablet Take 1 tablet (20 mg) by mouth daily. 90 tablet 3     tacrolimus (PROTOPIC) 0.1 % external ointment Apply topically 2 times daily as needed       No current facility-administered medications for this visit.       MEDICATION LIST (after visit)  Current Outpatient Medications   Medication Sig Dispense Refill    acetaminophen (TYLENOL) 500 MG tablet Take 1,000 mg by mouth 3 times daily as needed for mild pain      albuterol (PROAIR HFA) 108 (90 Base) MCG/ACT inhaler Inhale 2 puffs into the lungs every 6 hours as needed for shortness of breath / dyspnea or wheezing 1 g 11    aspirin 81 MG EC tablet Take 1 tablet (81 mg) by mouth daily      bisacodyl (DULCOLAX) 5 MG EC tablet Take 2 tablets at 3 pm the day before your procedure. If your procedure is before 11 am, take 2 additional tablets at 11 pm. If your procedure is after 11 am, take 2 additional tablets at 6 am. For additional instructions refer to your colonoscopy prep instructions. 4 tablet 0    budesonide (PULMICORT FLEXHALER) 90 MCG/ACT inhaler Inhale 2 puffs into the lungs 2 times daily as needed (shortness of breath)      clindamycin (CLEOCIN) 150 MG capsule TAKE 2 CAPSULES BY MOUTH 1 HOUR PRIOR TO DENTAL APPOINTMENT. TAKE 1 CAPSULE BY MOUTH EVERY 6 HOURS AFTER APPOINTMENT      entecavir (BARACLUDE) 0.5 MG tablet Take 1 tablet (0.5 mg) by mouth daily. 90 tablet 3    FLUoxetine (PROZAC) 20 MG capsule Take 1 capsule (20 mg) by mouth daily. With 40mg for a total daily dose of 60mg 90 capsule 0    FLUoxetine (PROZAC) 40 MG capsule Take 1 capsule (40 mg) by mouth daily. 90 capsule 0    fluticasone-salmeterol (ADVAIR) 250-50 MCG/ACT inhaler Inhale 1 puff into the lungs 2 times daily 180 each 3    furosemide (LASIX) 20 MG tablet Take 1 tablet (20 mg) by mouth daily as needed (fluid retention) 90 tablet 1    hydrOXYzine HCl (ATARAX) 10 MG tablet Take 1 tablet (10 mg) by mouth daily as needed for anxiety 30 tablet 1    hydrOXYzine HCl (ATARAX) 25 MG tablet Take 1 tablet (25 mg) by mouth nightly as needed for anxiety  (sleep) 90 tablet 1    isosorbide mononitrate (IMDUR) 60 MG 24 hr tablet Take 1 tablet (60 mg) by mouth daily. 90 tablet 2    latanoprost (XALATAN) 0.005 % ophthalmic solution Place 1 drop into both eyes At Bedtime 2.5 mL 11    metoprolol succinate ER (TOPROL XL) 25 MG 24 hr tablet Take 0.5 tablets (12.5 mg) by mouth daily. 45 tablet 2    Multiple Vitamins-Iron (ONE DAILY MULTIVITAMIN/IRON) TABS Take 1 tablet by mouth daily      mycophenolate (GENERIC EQUIVALENT) 250 MG capsule Take 3 capsules (750 mg) by mouth 2 times daily. 540 capsule 0    naltrexone (DEPADE/REVIA) 50 MG tablet Take 1 tablet (50 mg) by mouth daily. 30 tablet 2    nitroGLYcerin (NITROSTAT) 0.4 MG sublingual tablet Place 1 tablet (0.4 mg) under the tongue every 5 minutes as needed for chest pain 20 tablet 3    Omega-3 Fatty Acids (OMEGA 3 PO) Take 1 capsule by mouth daily Dose unknown      pantoprazole (PROTONIX) 40 MG EC tablet TAKE 1 TABLET BY MOUTH EVERY DAY 90 tablet 3    polyethylene glycol (GOLYTELY) 236 g suspension The night before the exam at 6 pm drink an 8-ounce glass every 15 minutes until the jug is half empty. If you arrive before 11 AM: Drink the other half of the Golytely jug at 11 PM night before procedure. If you arrive after 11 AM: Drink the other half of the Golytely jug at 6 AM day of procedure. For additional instructions refer to your colonoscopy prep instructions. 4000 mL 0    polyethylene glycol (MIRALAX) 17 g packet Take 17 g by mouth daily as needed       predniSONE (DELTASONE) 5 MG tablet Take 1 tablet (5 mg) by mouth daily 90 tablet 3    rosuvastatin (CRESTOR) 20 MG tablet Take 1 tablet (20 mg) by mouth daily. 90 tablet 3    tacrolimus (PROTOPIC) 0.1 % external ointment Apply topically 2 times daily as needed       No current facility-administered medications for this visit.         Allergies   Allergen Reactions    Penicillins Shortness Of Breath and Hives    Keflex [Cephalexin Hcl] Unknown     Patient could not recall  reaction    Sulfa Antibiotics Rash    Tetracycline Unknown     Patient could not recall reaction        Continued Complex Management  The longitudinal plan of care for Alcohol Use Disorder (AUD) was addressed during this visit. Due to the added complexity in care, I will continue to support Jerad in the subsequent management and with ongoing continuity of care.        Sarahy Mullins, DNP,MSN, AGNP-C  [facility]   [address], [address]   [place] [address]   Phone # [phone]

## 2025-06-12 ENCOUNTER — VIRTUAL VISIT (OUTPATIENT)
Dept: PSYCHIATRY | Facility: CLINIC | Age: 65
End: 2025-06-12
Attending: NURSE PRACTITIONER
Payer: COMMERCIAL

## 2025-06-12 DIAGNOSIS — F41.1 GENERALIZED ANXIETY DISORDER: Chronic | ICD-10-CM

## 2025-06-12 DIAGNOSIS — G47.00 INSOMNIA, UNSPECIFIED TYPE: ICD-10-CM

## 2025-06-12 DIAGNOSIS — F10.90 ALCOHOL USE DISORDER: ICD-10-CM

## 2025-06-12 DIAGNOSIS — F33.41 RECURRENT MAJOR DEPRESSIVE DISORDER, IN PARTIAL REMISSION: ICD-10-CM

## 2025-06-12 RX ORDER — FLUOXETINE HYDROCHLORIDE 40 MG/1
40 CAPSULE ORAL DAILY
Qty: 90 CAPSULE | Refills: 3 | Status: SHIPPED | OUTPATIENT
Start: 2025-06-12

## 2025-06-12 ASSESSMENT — PAIN SCALES - GENERAL: PAINLEVEL_OUTOF10: MILD PAIN (3)

## 2025-06-12 ASSESSMENT — PATIENT HEALTH QUESTIONNAIRE - PHQ9
10. IF YOU CHECKED OFF ANY PROBLEMS, HOW DIFFICULT HAVE THESE PROBLEMS MADE IT FOR YOU TO DO YOUR WORK, TAKE CARE OF THINGS AT HOME, OR GET ALONG WITH OTHER PEOPLE: SOMEWHAT DIFFICULT
SUM OF ALL RESPONSES TO PHQ QUESTIONS 1-9: 6
SUM OF ALL RESPONSES TO PHQ QUESTIONS 1-9: 6

## 2025-06-12 NOTE — PATIENT INSTRUCTIONS
**For crisis resources, please see the information at the end of this document**   Patient Education    Thank you for coming to the [facility].     Lab Testing:  If you had lab testing today and your results are reassuring or normal they will be mailed to you or sent through [facility] within 7 days. If the lab tests need quick action we will call you with the results. The phone number we will call with results is # [phone]. If this is not the best number please call our clinic and change the number.     Medication Refills:  If you need any refills please call your pharmacy and they will contact us. Our fax number for refills is [phone].   Three business days of notice are needed for general medication refill requests.   Five business days of notice are needed for controlled substance refill requests.   If you need to change to a different pharmacy, please contact the new pharmacy directly. The new pharmacy will help you get your medications transferred.     Contact Us:  Please call [phone] during business hours (8-5:00 M-F).   If you have medication related questions after clinic hours, or on the weekend, please call [phone].     Financial Assistance [phone]   Medical Records [phone]       MENTAL HEALTH CRISIS RESOURCES:  For a emergency help, please call 911 or go to the nearest Emergency Department.     Emergency Walk-In Options:   Em[facility] Unit @ [facility] ([place]): [phone] - Specialized mental health emergency area designed to be calming  [facility] ([place]): [phone]  [facility] ([place]): [phone]  [facility]): [phone]    [facility] Crisis Information:   [place]: [phone]  [place]: [phone]  [place] ([place]) - Adult: [phone]     Child: [phone]  [place] - Adult: [phone]     Child: [phone]  [place]:  [phone]  List of all [facility] resources:   https://[place].gov/dhs/people-we-serve/adults/health-care/mental-health/resources/crisis-contacts.jsp    National Crisis Information:   Crisis Text Line: Text  [place]  to 032351  Suicide & Crisis Lifeline: 988  National Suicide Prevention Lifeline: 1-467-410-TALK (1-988.900.7549)       For online chat options, visit https://suicidepreventionlifeline.org/chat/  Poison Control Center: 9-302-381-4026  Trans Lifeline: 2-771-597-7059 - Hotline for transgender people of all ages  [employer]: 0-662-276-4024 - Hotline for LGBT youth     For Non-Emergency Support:   Fast Tracker: Mental Health & Substance Use Disorder Resources -   https://www.[employer].org/
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
## 2025-06-12 NOTE — NURSING NOTE
Current patient location: [address] [place] [address]    Is the patient currently in the state of [place]? YES    Visit mode: VIDEO    If the visit is dropped, the patient can be reconnected by:VIDEO VISIT: Text to cell phone:   Telephone Information:   Mobile [phone]       Will anyone else be joining the visit? NO  (If patient encounters technical issues they should call [phone])    Are changes needed to the allergy or medication list? No    Are refills needed on medications prescribed by this physician? Discuss with provider    Rooming Documentation:  Questionnaire(s) completed    Reason for visit: KURT LUNA

## 2025-06-16 ENCOUNTER — TELEPHONE (OUTPATIENT)
Dept: ORTHOPEDICS | Facility: CLINIC | Age: 65
End: 2025-06-16
Payer: COMMERCIAL

## 2025-06-16 NOTE — TELEPHONE ENCOUNTER
Called Pt and scheduled Dr Bartholomew on 8/13/25. Had RIGHT HIP REVISION 6/19/23 Juan Mcdonough MD. Being seen at [facility] his Provider is retiring.  Navarro BRITT

## 2025-06-16 NOTE — TELEPHONE ENCOUNTER
General Call      Reason for Call:  Appointment request    What are your questions or concerns:  Pt does not have a referral and would like to see Dr. Bartholomew and potentially est care. Pt has had hip replacement in 2023 at  [facility] and is now having pain there. Pt's ortho doctor is at [facility] and is retiring. Can Dr. Bartholomew see pt for this ? Please call pt to advise if appointment can be made.     Date of last appointment with provider:     Could we send this information to you in [facility] or would you prefer to receive a phone call?:   Patient would prefer a phone call   Okay to leave a detailed message?: Yes at Cell number on file:    Telephone Information:   Mobile [phone]

## 2025-06-29 ASSESSMENT — ASTHMA QUESTIONNAIRES
QUESTION_5 LAST FOUR WEEKS HOW WOULD YOU RATE YOUR ASTHMA CONTROL: WELL CONTROLLED
QUESTION_2 LAST FOUR WEEKS HOW OFTEN HAVE YOU HAD SHORTNESS OF BREATH: ONCE OR TWICE A WEEK
QUESTION_1 LAST FOUR WEEKS HOW MUCH OF THE TIME DID YOUR ASTHMA KEEP YOU FROM GETTING AS MUCH DONE AT WORK, SCHOOL OR AT HOME: NONE OF THE TIME
ACT_TOTALSCORE: 22
QUESTION_4 LAST FOUR WEEKS HOW OFTEN HAVE YOU USED YOUR RESCUE INHALER OR NEBULIZER MEDICATION (SUCH AS ALBUTEROL): ONCE A WEEK OR LESS
QUESTION_3 LAST FOUR WEEKS HOW OFTEN DID YOUR ASTHMA SYMPTOMS (WHEEZING, COUGHING, SHORTNESS OF BREATH, CHEST TIGHTNESS OR PAIN) WAKE YOU UP AT NIGHT OR EARLIER THAN USUAL IN THE MORNING: NOT AT ALL

## 2025-06-30 ENCOUNTER — OFFICE VISIT (OUTPATIENT)
Dept: FAMILY MEDICINE | Facility: CLINIC | Age: 65
End: 2025-06-30
Payer: COMMERCIAL

## 2025-06-30 ENCOUNTER — RESULTS FOLLOW-UP (OUTPATIENT)
Dept: TRANSPLANT | Facility: CLINIC | Age: 65
End: 2025-06-30

## 2025-06-30 VITALS
TEMPERATURE: 97.9 F | DIASTOLIC BLOOD PRESSURE: 84 MMHG | WEIGHT: 171.8 LBS | SYSTOLIC BLOOD PRESSURE: 126 MMHG | OXYGEN SATURATION: 94 % | BODY MASS INDEX: 26.97 KG/M2 | HEIGHT: 67 IN | HEART RATE: 72 BPM | RESPIRATION RATE: 18 BRPM

## 2025-06-30 DIAGNOSIS — Z48.298 AFTERCARE FOLLOWING ORGAN TRANSPLANT: ICD-10-CM

## 2025-06-30 DIAGNOSIS — Z95.1 S/P CABG (CORONARY ARTERY BYPASS GRAFT): ICD-10-CM

## 2025-06-30 DIAGNOSIS — N18.6 END STAGE RENAL DISEASE (H): Primary | ICD-10-CM

## 2025-06-30 DIAGNOSIS — B18.1 CHRONIC HEPATITIS B (H): ICD-10-CM

## 2025-06-30 DIAGNOSIS — M25.551 HIP PAIN, RIGHT: ICD-10-CM

## 2025-06-30 DIAGNOSIS — M25.551 HIP PAIN, RIGHT: Primary | ICD-10-CM

## 2025-06-30 DIAGNOSIS — B18.1 CHRONIC VIRAL HEPATITIS B WITHOUT DELTA AGENT AND WITHOUT COMA (H): ICD-10-CM

## 2025-06-30 DIAGNOSIS — Z94.0 KIDNEY TRANSPLANTED: ICD-10-CM

## 2025-06-30 LAB
AFP SERPL-MCNC: 2 NG/ML
ALBUMIN MFR UR ELPH: 22.2 MG/DL
ALBUMIN SERPL BCG-MCNC: 3.9 G/DL (ref 3.5–5.2)
ALP SERPL-CCNC: 79 U/L (ref 40–150)
ALT SERPL W P-5'-P-CCNC: 15 U/L (ref 0–70)
ANION GAP SERPL CALCULATED.3IONS-SCNC: 13 MMOL/L (ref 7–15)
AST SERPL W P-5'-P-CCNC: 31 U/L (ref 0–45)
BILIRUB SERPL-MCNC: 0.8 MG/DL
BILIRUBIN DIRECT (ROCHE PRO & PURE): 0.31 MG/DL (ref 0–0.45)
BUN SERPL-MCNC: 14.5 MG/DL (ref 8–23)
CALCIUM SERPL-MCNC: 9.9 MG/DL (ref 8.8–10.4)
CHLORIDE SERPL-SCNC: 101 MMOL/L (ref 98–107)
CHOLEST SERPL-MCNC: 157 MG/DL
CREAT SERPL-MCNC: 0.72 MG/DL (ref 0.67–1.17)
CREAT UR-MCNC: 173 MG/DL
EGFRCR SERPLBLD CKD-EPI 2021: >90 ML/MIN/1.73M2
ERYTHROCYTE [DISTWIDTH] IN BLOOD BY AUTOMATED COUNT: 15.6 % (ref 10–15)
FASTING STATUS PATIENT QL REPORTED: ABNORMAL
FASTING STATUS PATIENT QL REPORTED: NORMAL
GLUCOSE SERPL-MCNC: 100 MG/DL (ref 70–99)
HCO3 SERPL-SCNC: 25 MMOL/L (ref 22–29)
HCT VFR BLD AUTO: 41.9 % (ref 40–53)
HDLC SERPL-MCNC: 64 MG/DL
HGB BLD-MCNC: 13.4 G/DL (ref 13.3–17.7)
INR PPP: 0.94 (ref 0.85–1.15)
LDLC SERPL CALC-MCNC: 67 MG/DL
MCH RBC QN AUTO: 28.9 PG (ref 26.5–33)
MCHC RBC AUTO-ENTMCNC: 32 G/DL (ref 31.5–36.5)
MCV RBC AUTO: 91 FL (ref 78–100)
NONHDLC SERPL-MCNC: 93 MG/DL
PLATELET # BLD AUTO: 302 10E3/UL (ref 150–450)
POTASSIUM SERPL-SCNC: 4.6 MMOL/L (ref 3.4–5.3)
PROT SERPL-MCNC: 6.7 G/DL (ref 6.4–8.3)
PROT/CREAT 24H UR: 0.13 MG/MG CR (ref 0–0.2)
PROTHROMBIN TIME: 13 SECONDS (ref 11.8–14.8)
RBC # BLD AUTO: 4.63 10E6/UL (ref 4.4–5.9)
SODIUM SERPL-SCNC: 139 MMOL/L (ref 135–145)
TRIGL SERPL-MCNC: 132 MG/DL
WBC # BLD AUTO: 9.5 10E3/UL (ref 4–11)

## 2025-06-30 PROCEDURE — 3074F SYST BP LT 130 MM HG: CPT | Performed by: NURSE PRACTITIONER

## 2025-06-30 PROCEDURE — 85610 PROTHROMBIN TIME: CPT | Performed by: NURSE PRACTITIONER

## 2025-06-30 PROCEDURE — 85027 COMPLETE CBC AUTOMATED: CPT | Performed by: NURSE PRACTITIONER

## 2025-06-30 PROCEDURE — 80053 COMPREHEN METABOLIC PANEL: CPT | Performed by: NURSE PRACTITIONER

## 2025-06-30 PROCEDURE — 1125F AMNT PAIN NOTED PAIN PRSNT: CPT | Performed by: NURSE PRACTITIONER

## 2025-06-30 PROCEDURE — 84156 ASSAY OF PROTEIN URINE: CPT | Performed by: NURSE PRACTITIONER

## 2025-06-30 PROCEDURE — 80061 LIPID PANEL: CPT | Performed by: NURSE PRACTITIONER

## 2025-06-30 PROCEDURE — 36415 COLL VENOUS BLD VENIPUNCTURE: CPT | Performed by: NURSE PRACTITIONER

## 2025-06-30 PROCEDURE — 87517 HEPATITIS B DNA QUANT: CPT | Performed by: NURSE PRACTITIONER

## 2025-06-30 PROCEDURE — 3079F DIAST BP 80-89 MM HG: CPT | Performed by: NURSE PRACTITIONER

## 2025-06-30 PROCEDURE — 82105 ALPHA-FETOPROTEIN SERUM: CPT | Performed by: NURSE PRACTITIONER

## 2025-06-30 PROCEDURE — 99213 OFFICE O/P EST LOW 20 MIN: CPT | Performed by: NURSE PRACTITIONER

## 2025-06-30 PROCEDURE — 3048F LDL-C <100 MG/DL: CPT | Performed by: NURSE PRACTITIONER

## 2025-06-30 PROCEDURE — 82248 BILIRUBIN DIRECT: CPT | Performed by: NURSE PRACTITIONER

## 2025-06-30 RX ORDER — PREDNISONE 20 MG/1
40 TABLET ORAL DAILY
Qty: 10 TABLET | Refills: 0 | Status: SHIPPED | OUTPATIENT
Start: 2025-06-30 | End: 2025-07-05

## 2025-06-30 ASSESSMENT — ENCOUNTER SYMPTOMS: BACK PAIN: 1

## 2025-06-30 ASSESSMENT — PAIN SCALES - GENERAL: PAINLEVEL_OUTOF10: MILD PAIN (3)

## 2025-06-30 NOTE — PROGRESS NOTES
"  {PROVIDER CHARTING PREFERENCE:556363}    Garrett Mars is a 65 year old, presenting for the following health issues:  Back Pain        6/30/2025     2:08 PM   Additional Questions   Roomed by leida jaquez ma   Accompanied by self         6/30/2025     2:08 PM   Patient Reported Additional Medications   Patient reports taking the following new medications none     Back Pain     History of Present Illness       Reason for visit:  Siatica pain  Symptom onset:  1-2 weeks ago  Symptoms include:  Pain in right hip  Symptom intensity:  Severe  Symptom progression:  Staying the same  Had these symptoms before:  Yes  Has tried/received treatment for these symptoms:  Yes  Previous treatment was successful:  Yes  Prior treatment description:  Cortisone injection in hip  What makes it worse:  Waking up  What makes it better:  Ibuprofen   He is taking medications regularly.      Additional provider notes:    Has had R hip revisions    Has appointment with a new Orthopedic but not until August 2025.  Has been toidn tylenol and ibuprofen, ice, and started some exercises.  Clam shells, bridges.  Today a little bit better.  Posterior hip, some radiation into front right upper leg        {SUPERLIST (Optional):174742}  {additonal problems for provider to add (Optional):235929}    {ROS Picklists (Optional):348538}      Objective    /84 (BP Location: Right arm, Patient Position: Sitting, Cuff Size: Adult Regular)   Pulse 72   Temp 97.9  F (36.6  C) (Oral)   Resp 18   Ht 1.702 m (5' 7\")   Wt 77.9 kg (171 lb 12.8 oz)   SpO2 94%   BMI 26.91 kg/m    Body mass index is 26.91 kg/m .  Physical Exam   {Exam List (Optional):755370}    {Diagnostic Test Results (Optional):901608}        Signed Electronically by: RONALDO Hernandez CNP  {Email feedback regarding this note to [email or web address]   :[id number]}  " "Severe  Symptom progression:  Staying the same  Had these symptoms before:  Yes  Has tried/received treatment for these symptoms:  Yes  Previous treatment was successful:  Yes  Prior treatment description:  Cortisone injection in hip  What makes it worse:  Waking up  What makes it better:  Ibuprofen   He is taking medications regularly.      Additional provider notes:    Has had R hip revisions  Has appointment with a new Orthopedic but not until August 2025.  Has been taking tylenol and ibuprofen, ice, and started some exercises (Clam shells, bridges)  Today a little bit better.  Posterior right hip, some radiation into front right upper leg.                Objective    /84 (BP Location: Right arm, Patient Position: Sitting, Cuff Size: Adult Regular)   Pulse 72   Temp 97.9  F (36.6  C) (Oral)   Resp 18   Ht 1.702 m (5' 7\")   Wt 77.9 kg (171 lb 12.8 oz)   SpO2 94%   BMI 26.91 kg/m    Body mass index is 26.91 kg/m .  Physical Exam   GENERAL: alert and no distress  MS: tenderness to right hip, reduced external rotation right hip  PSYCH: mentation appears normal, affect normal/bright            Signed Electronically by: RONALDO Hernandez CNP    "

## 2025-07-01 ENCOUNTER — RESULTS FOLLOW-UP (OUTPATIENT)
Dept: INTERNAL MEDICINE | Facility: CLINIC | Age: 65
End: 2025-07-01

## 2025-07-01 DIAGNOSIS — B18.1 CHRONIC HEPATITIS B (H): Primary | ICD-10-CM

## 2025-07-01 LAB — HBV DNA SERPL NAA+PROBE-ACNC: NOT DETECTED IU/ML

## 2025-07-02 DIAGNOSIS — Z48.298 AFTERCARE FOLLOWING ORGAN TRANSPLANT: ICD-10-CM

## 2025-07-02 DIAGNOSIS — Z94.0 KIDNEY REPLACED BY TRANSPLANT: ICD-10-CM

## 2025-07-02 DIAGNOSIS — Z79.899 ENCOUNTER FOR LONG-TERM CURRENT USE OF MEDICATION: ICD-10-CM

## 2025-07-02 DIAGNOSIS — Z98.890 OTHER SPECIFIED POSTPROCEDURAL STATES: ICD-10-CM

## 2025-07-02 DIAGNOSIS — Z94.0 KIDNEY TRANSPLANTED: Primary | ICD-10-CM

## 2025-07-02 RX ORDER — PREDNISONE 5 MG/1
5 TABLET ORAL DAILY
Qty: 90 TABLET | Refills: 3 | Status: SHIPPED | OUTPATIENT
Start: 2025-07-02

## 2025-07-07 DIAGNOSIS — Z94.0 KIDNEY TRANSPLANTED: ICD-10-CM

## 2025-07-07 RX ORDER — PREDNISONE 5 MG/1
5 TABLET ORAL DAILY
Qty: 90 TABLET | Refills: 3 | Status: SHIPPED | OUTPATIENT
Start: 2025-07-07

## 2025-07-09 ENCOUNTER — HOSPITAL ENCOUNTER (INPATIENT)
Facility: CLINIC | Age: 65
DRG: 193 | End: 2025-07-09
Attending: STUDENT IN AN ORGANIZED HEALTH CARE EDUCATION/TRAINING PROGRAM | Admitting: STUDENT IN AN ORGANIZED HEALTH CARE EDUCATION/TRAINING PROGRAM
Payer: COMMERCIAL

## 2025-07-09 ENCOUNTER — APPOINTMENT (OUTPATIENT)
Dept: CT IMAGING | Facility: CLINIC | Age: 65
End: 2025-07-09
Attending: STUDENT IN AN ORGANIZED HEALTH CARE EDUCATION/TRAINING PROGRAM
Payer: COMMERCIAL

## 2025-07-09 ENCOUNTER — APPOINTMENT (OUTPATIENT)
Dept: GENERAL RADIOLOGY | Facility: CLINIC | Age: 65
End: 2025-07-09
Attending: STUDENT IN AN ORGANIZED HEALTH CARE EDUCATION/TRAINING PROGRAM
Payer: COMMERCIAL

## 2025-07-09 ENCOUNTER — APPOINTMENT (OUTPATIENT)
Dept: CT IMAGING | Facility: CLINIC | Age: 65
End: 2025-07-09
Payer: COMMERCIAL

## 2025-07-09 DIAGNOSIS — J18.9 COMMUNITY ACQUIRED PNEUMONIA, UNSPECIFIED LATERALITY: ICD-10-CM

## 2025-07-09 DIAGNOSIS — Z78.9 ON SUPPLEMENTAL OXYGEN BY NASAL CANNULA: ICD-10-CM

## 2025-07-09 DIAGNOSIS — Z94.0 KIDNEY REPLACED BY TRANSPLANT: Primary | ICD-10-CM

## 2025-07-09 DIAGNOSIS — I50.30 HEART FAILURE WITH PRESERVED EJECTION FRACTION, NYHA CLASS I (H): ICD-10-CM

## 2025-07-09 DIAGNOSIS — R06.02 SHORTNESS OF BREATH: ICD-10-CM

## 2025-07-09 DIAGNOSIS — Z94.0 KIDNEY TRANSPLANTED: ICD-10-CM

## 2025-07-09 DIAGNOSIS — R23.8 SKIN IRRITATION: ICD-10-CM

## 2025-07-09 LAB
ALBUMIN SERPL BCG-MCNC: 3.5 G/DL (ref 3.5–5.2)
ALBUMIN UR-MCNC: 20 MG/DL
ALLEN'S TEST: YES
ALP SERPL-CCNC: 82 U/L (ref 40–150)
ALT SERPL W P-5'-P-CCNC: 23 U/L (ref 0–70)
ANION GAP SERPL CALCULATED.3IONS-SCNC: 14 MMOL/L (ref 7–15)
APPEARANCE UR: ABNORMAL
AST SERPL W P-5'-P-CCNC: 49 U/L (ref 0–45)
ATRIAL RATE - MUSE: 85 BPM
BACTERIA SPT CULT: NORMAL
BASE EXCESS BLDA CALC-SCNC: -2.9 MMOL/L (ref -3–3)
BASE EXCESS BLDV CALC-SCNC: -0.8 MMOL/L (ref -3–3)
BASE EXCESS BLDV CALC-SCNC: -2.8 MMOL/L (ref -3–3)
BASOPHILS # BLD AUTO: 0 10E3/UL (ref 0–0.2)
BASOPHILS NFR BLD AUTO: 0 %
BILIRUB SERPL-MCNC: 1 MG/DL
BILIRUB UR QL STRIP: NEGATIVE
BUN SERPL-MCNC: 12.4 MG/DL (ref 8–23)
C PNEUM DNA SPEC QL NAA+PROBE: NOT DETECTED
CALCIUM SERPL-MCNC: 9.3 MG/DL (ref 8.8–10.4)
CHLORIDE SERPL-SCNC: 104 MMOL/L (ref 98–107)
COLOR UR AUTO: YELLOW
CREAT SERPL-MCNC: 0.72 MG/DL (ref 0.67–1.17)
CRP SERPL-MCNC: 181 MG/L
DIASTOLIC BLOOD PRESSURE - MUSE: NORMAL MMHG
EGFRCR SERPLBLD CKD-EPI 2021: >90 ML/MIN/1.73M2
EOSINOPHIL # BLD AUTO: 0.3 10E3/UL (ref 0–0.7)
EOSINOPHIL NFR BLD AUTO: 3 %
ERYTHROCYTE [DISTWIDTH] IN BLOOD BY AUTOMATED COUNT: 15.9 % (ref 10–15)
ERYTHROCYTE [SEDIMENTATION RATE] IN BLOOD BY WESTERGREN METHOD: 27 MM/HR (ref 0–20)
FLUAV H1 2009 PAND RNA SPEC QL NAA+PROBE: NOT DETECTED
FLUAV H1 RNA SPEC QL NAA+PROBE: NOT DETECTED
FLUAV H3 RNA SPEC QL NAA+PROBE: NOT DETECTED
FLUAV RNA SPEC QL NAA+PROBE: NOT DETECTED
FLUBV RNA SPEC QL NAA+PROBE: NOT DETECTED
GLUCOSE SERPL-MCNC: 85 MG/DL (ref 70–99)
GLUCOSE UR STRIP-MCNC: NEGATIVE MG/DL
GRAM STAIN RESULT: NORMAL
HADV DNA SPEC QL NAA+PROBE: NOT DETECTED
HCO3 BLD-SCNC: 22 MMOL/L (ref 21–28)
HCO3 BLDV-SCNC: 22 MMOL/L (ref 21–28)
HCO3 BLDV-SCNC: 26 MMOL/L (ref 21–28)
HCO3 SERPL-SCNC: 22 MMOL/L (ref 22–29)
HCOV PNL SPEC NAA+PROBE: NOT DETECTED
HCT VFR BLD AUTO: 44 % (ref 40–53)
HGB BLD-MCNC: 14 G/DL (ref 13.3–17.7)
HGB UR QL STRIP: NEGATIVE
HMPV RNA SPEC QL NAA+PROBE: NOT DETECTED
HPIV1 RNA SPEC QL NAA+PROBE: NOT DETECTED
HPIV2 RNA SPEC QL NAA+PROBE: NOT DETECTED
HPIV3 RNA SPEC QL NAA+PROBE: NOT DETECTED
HPIV4 RNA SPEC QL NAA+PROBE: NOT DETECTED
IMM GRANULOCYTES # BLD: 0.1 10E3/UL
IMM GRANULOCYTES NFR BLD: 0 %
INR PPP: 1.03 (ref 0.85–1.15)
INTERPRETATION ECG - MUSE: NORMAL
KETONES UR STRIP-MCNC: ABNORMAL MG/DL
L PNEUMO1 AG UR QL IA: NEGATIVE
LACTATE SERPL-SCNC: 1.4 MMOL/L (ref 0.7–2)
LACTATE SERPL-SCNC: 2.5 MMOL/L (ref 0.7–2)
LACTATE SERPL-SCNC: 3.3 MMOL/L (ref 0.7–2)
LEUKOCYTE ESTERASE UR QL STRIP: NEGATIVE
LYMPHOCYTES # BLD AUTO: 1.8 10E3/UL (ref 0.8–5.3)
LYMPHOCYTES NFR BLD AUTO: 15 %
M PNEUMO DNA SPEC QL NAA+PROBE: NOT DETECTED
MCH RBC QN AUTO: 28.5 PG (ref 26.5–33)
MCHC RBC AUTO-ENTMCNC: 31.8 G/DL (ref 31.5–36.5)
MCV RBC AUTO: 90 FL (ref 78–100)
MONOCYTES # BLD AUTO: 1 10E3/UL (ref 0–1.3)
MONOCYTES NFR BLD AUTO: 8 %
MRSA DNA SPEC QL NAA+PROBE: NEGATIVE
MUCOUS THREADS #/AREA URNS LPF: PRESENT /LPF
NEUTROPHILS # BLD AUTO: 8.8 10E3/UL (ref 1.6–8.3)
NEUTROPHILS NFR BLD AUTO: 74 %
NITRATE UR QL: NEGATIVE
NRBC # BLD AUTO: 0 10E3/UL
NRBC BLD AUTO-RTO: 0 /100
NT-PROBNP SERPL-MCNC: 830 PG/ML (ref 0–177)
O2/TOTAL GAS SETTING VFR VENT: 15 %
O2/TOTAL GAS SETTING VFR VENT: 21 %
O2/TOTAL GAS SETTING VFR VENT: 4 %
OXYHGB MFR BLDA: 94 % (ref 92–100)
OXYHGB MFR BLDV: 23 % (ref 70–75)
OXYHGB MFR BLDV: 90 % (ref 70–75)
P AXIS - MUSE: 20 DEGREES
PCO2 BLD: 39 MM HG (ref 35–45)
PCO2 BLDV: 39 MM HG (ref 40–50)
PCO2 BLDV: 50 MM HG (ref 40–50)
PH BLD: 7.36 [PH] (ref 7.35–7.45)
PH BLDV: 7.33 [PH] (ref 7.32–7.43)
PH BLDV: 7.36 [PH] (ref 7.32–7.43)
PH UR STRIP: 6 [PH] (ref 5–7)
PLATELET # BLD AUTO: 275 10E3/UL (ref 150–450)
PO2 BLD: 74 MM HG (ref 80–105)
PO2 BLDV: 19 MM HG (ref 25–47)
PO2 BLDV: 62 MM HG (ref 25–47)
POTASSIUM SERPL-SCNC: 3.4 MMOL/L (ref 3.4–5.3)
PR INTERVAL - MUSE: 158 MS
PROT SERPL-MCNC: 6.6 G/DL (ref 6.4–8.3)
PROTHROMBIN TIME: 13.8 SECONDS (ref 11.8–14.8)
QRS DURATION - MUSE: 88 MS
QT - MUSE: 444 MS
QTC - MUSE: 528 MS
R AXIS - MUSE: -17 DEGREES
RBC # BLD AUTO: 4.91 10E6/UL (ref 4.4–5.9)
RBC URINE: 1 /HPF
RSV RNA SPEC QL NAA+PROBE: NOT DETECTED
RSV RNA SPEC QL NAA+PROBE: NOT DETECTED
RV+EV RNA SPEC QL NAA+PROBE: DETECTED
S PNEUM AG SPEC QL: NEGATIVE
SA TARGET DNA: NEGATIVE
SAO2 % BLDA: 95.3 % (ref 96–97)
SAO2 % BLDV: 23 % (ref 70–75)
SAO2 % BLDV: 91.8 % (ref 70–75)
SODIUM SERPL-SCNC: 140 MMOL/L (ref 135–145)
SP GR UR STRIP: 1.01 (ref 1–1.03)
SPECIMEN TYPE: NORMAL
SYSTOLIC BLOOD PRESSURE - MUSE: NORMAL MMHG
T AXIS - MUSE: 102 DEGREES
TRANSITIONAL EPI: 1 /HPF
TROPONIN T SERPL HS-MCNC: 32 NG/L
TROPONIN T SERPL HS-MCNC: 33 NG/L
UROBILINOGEN UR STRIP-MCNC: 2 MG/DL
VENTRICULAR RATE- MUSE: 85 BPM
WBC # BLD AUTO: 12 10E3/UL (ref 4–11)
WBC URINE: 7 /HPF

## 2025-07-09 PROCEDURE — 87385 HISTOPLASMA CAPSUL AG IA: CPT | Performed by: STUDENT IN AN ORGANIZED HEALTH CARE EDUCATION/TRAINING PROGRAM

## 2025-07-09 PROCEDURE — 999N000157 HC STATISTIC RCP TIME EA 10 MIN

## 2025-07-09 PROCEDURE — 250N000011 HC RX IP 250 OP 636

## 2025-07-09 PROCEDURE — 84484 ASSAY OF TROPONIN QUANT: CPT | Performed by: STUDENT IN AN ORGANIZED HEALTH CARE EDUCATION/TRAINING PROGRAM

## 2025-07-09 PROCEDURE — 82805 BLOOD GASES W/O2 SATURATION: CPT | Performed by: STUDENT IN AN ORGANIZED HEALTH CARE EDUCATION/TRAINING PROGRAM

## 2025-07-09 PROCEDURE — 86140 C-REACTIVE PROTEIN: CPT | Performed by: STUDENT IN AN ORGANIZED HEALTH CARE EDUCATION/TRAINING PROGRAM

## 2025-07-09 PROCEDURE — 71260 CT THORAX DX C+: CPT | Mod: 26 | Performed by: RADIOLOGY

## 2025-07-09 PROCEDURE — 5A09357 ASSISTANCE WITH RESPIRATORY VENTILATION, LESS THAN 24 CONSECUTIVE HOURS, CONTINUOUS POSITIVE AIRWAY PRESSURE: ICD-10-PCS | Performed by: STUDENT IN AN ORGANIZED HEALTH CARE EDUCATION/TRAINING PROGRAM

## 2025-07-09 PROCEDURE — 71045 X-RAY EXAM CHEST 1 VIEW: CPT | Mod: 26 | Performed by: RADIOLOGY

## 2025-07-09 PROCEDURE — 85025 COMPLETE CBC W/AUTO DIFF WBC: CPT | Performed by: STUDENT IN AN ORGANIZED HEALTH CARE EDUCATION/TRAINING PROGRAM

## 2025-07-09 PROCEDURE — 71045 X-RAY EXAM CHEST 1 VIEW: CPT

## 2025-07-09 PROCEDURE — 258N000003 HC RX IP 258 OP 636: Performed by: STUDENT IN AN ORGANIZED HEALTH CARE EDUCATION/TRAINING PROGRAM

## 2025-07-09 PROCEDURE — 82247 BILIRUBIN TOTAL: CPT | Performed by: STUDENT IN AN ORGANIZED HEALTH CARE EDUCATION/TRAINING PROGRAM

## 2025-07-09 PROCEDURE — 96365 THER/PROPH/DIAG IV INF INIT: CPT | Mod: 59 | Performed by: STUDENT IN AN ORGANIZED HEALTH CARE EDUCATION/TRAINING PROGRAM

## 2025-07-09 PROCEDURE — 87641 MR-STAPH DNA AMP PROBE: CPT

## 2025-07-09 PROCEDURE — 87040 BLOOD CULTURE FOR BACTERIA: CPT | Performed by: STUDENT IN AN ORGANIZED HEALTH CARE EDUCATION/TRAINING PROGRAM

## 2025-07-09 PROCEDURE — 94640 AIRWAY INHALATION TREATMENT: CPT | Mod: 76

## 2025-07-09 PROCEDURE — 99223 1ST HOSP IP/OBS HIGH 75: CPT | Mod: GC | Performed by: INTERNAL MEDICINE

## 2025-07-09 PROCEDURE — 71260 CT THORAX DX C+: CPT

## 2025-07-09 PROCEDURE — 272N000054 HC CANNULA HIGH FLOW, ADULT

## 2025-07-09 PROCEDURE — 250N000012 HC RX MED GY IP 250 OP 636 PS 637

## 2025-07-09 PROCEDURE — 85610 PROTHROMBIN TIME: CPT | Performed by: STUDENT IN AN ORGANIZED HEALTH CARE EDUCATION/TRAINING PROGRAM

## 2025-07-09 PROCEDURE — 250N000009 HC RX 250

## 2025-07-09 PROCEDURE — 99285 EMERGENCY DEPT VISIT HI MDM: CPT | Mod: 25 | Performed by: STUDENT IN AN ORGANIZED HEALTH CARE EDUCATION/TRAINING PROGRAM

## 2025-07-09 PROCEDURE — 250N000013 HC RX MED GY IP 250 OP 250 PS 637

## 2025-07-09 PROCEDURE — 93005 ELECTROCARDIOGRAM TRACING: CPT | Performed by: STUDENT IN AN ORGANIZED HEALTH CARE EDUCATION/TRAINING PROGRAM

## 2025-07-09 PROCEDURE — 87899 AGENT NOS ASSAY W/OPTIC: CPT

## 2025-07-09 PROCEDURE — 94660 CPAP INITIATION&MGMT: CPT

## 2025-07-09 PROCEDURE — 99223 1ST HOSP IP/OBS HIGH 75: CPT | Mod: GC | Performed by: STUDENT IN AN ORGANIZED HEALTH CARE EDUCATION/TRAINING PROGRAM

## 2025-07-09 PROCEDURE — 36415 COLL VENOUS BLD VENIPUNCTURE: CPT | Performed by: STUDENT IN AN ORGANIZED HEALTH CARE EDUCATION/TRAINING PROGRAM

## 2025-07-09 PROCEDURE — 83605 ASSAY OF LACTIC ACID: CPT

## 2025-07-09 PROCEDURE — 250N000011 HC RX IP 250 OP 636: Mod: JZ

## 2025-07-09 PROCEDURE — 250N000011 HC RX IP 250 OP 636: Performed by: STUDENT IN AN ORGANIZED HEALTH CARE EDUCATION/TRAINING PROGRAM

## 2025-07-09 PROCEDURE — 96361 HYDRATE IV INFUSION ADD-ON: CPT | Performed by: STUDENT IN AN ORGANIZED HEALTH CARE EDUCATION/TRAINING PROGRAM

## 2025-07-09 PROCEDURE — 71275 CT ANGIOGRAPHY CHEST: CPT

## 2025-07-09 PROCEDURE — 36415 COLL VENOUS BLD VENIPUNCTURE: CPT

## 2025-07-09 PROCEDURE — 87633 RESP VIRUS 12-25 TARGETS: CPT | Performed by: STUDENT IN AN ORGANIZED HEALTH CARE EDUCATION/TRAINING PROGRAM

## 2025-07-09 PROCEDURE — 87154 CUL TYP ID BLD PTHGN 6+ TRGT: CPT | Performed by: STUDENT IN AN ORGANIZED HEALTH CARE EDUCATION/TRAINING PROGRAM

## 2025-07-09 PROCEDURE — 82805 BLOOD GASES W/O2 SATURATION: CPT

## 2025-07-09 PROCEDURE — 99285 EMERGENCY DEPT VISIT HI MDM: CPT | Performed by: STUDENT IN AN ORGANIZED HEALTH CARE EDUCATION/TRAINING PROGRAM

## 2025-07-09 PROCEDURE — 85652 RBC SED RATE AUTOMATED: CPT | Performed by: STUDENT IN AN ORGANIZED HEALTH CARE EDUCATION/TRAINING PROGRAM

## 2025-07-09 PROCEDURE — 94640 AIRWAY INHALATION TREATMENT: CPT

## 2025-07-09 PROCEDURE — 83605 ASSAY OF LACTIC ACID: CPT | Performed by: STUDENT IN AN ORGANIZED HEALTH CARE EDUCATION/TRAINING PROGRAM

## 2025-07-09 PROCEDURE — 93010 ELECTROCARDIOGRAM REPORT: CPT | Performed by: STUDENT IN AN ORGANIZED HEALTH CARE EDUCATION/TRAINING PROGRAM

## 2025-07-09 PROCEDURE — 120N000011 HC R&B TRANSPLANT UMMC

## 2025-07-09 PROCEDURE — 36600 WITHDRAWAL OF ARTERIAL BLOOD: CPT

## 2025-07-09 PROCEDURE — 87070 CULTURE OTHR SPECIMN AEROBIC: CPT

## 2025-07-09 PROCEDURE — 83880 ASSAY OF NATRIURETIC PEPTIDE: CPT | Performed by: STUDENT IN AN ORGANIZED HEALTH CARE EDUCATION/TRAINING PROGRAM

## 2025-07-09 PROCEDURE — 81001 URINALYSIS AUTO W/SCOPE: CPT | Performed by: STUDENT IN AN ORGANIZED HEALTH CARE EDUCATION/TRAINING PROGRAM

## 2025-07-09 PROCEDURE — 71275 CT ANGIOGRAPHY CHEST: CPT | Mod: 26 | Performed by: RADIOLOGY

## 2025-07-09 PROCEDURE — 96368 THER/DIAG CONCURRENT INF: CPT | Performed by: STUDENT IN AN ORGANIZED HEALTH CARE EDUCATION/TRAINING PROGRAM

## 2025-07-09 RX ORDER — NALTREXONE HYDROCHLORIDE 50 MG/1
50 TABLET, FILM COATED ORAL DAILY
Status: DISCONTINUED | OUTPATIENT
Start: 2025-07-10 | End: 2025-07-21 | Stop reason: HOSPADM

## 2025-07-09 RX ORDER — MYCOPHENOLATE MOFETIL 250 MG/1
750 CAPSULE ORAL 2 TIMES DAILY
Status: DISCONTINUED | OUTPATIENT
Start: 2025-07-09 | End: 2025-07-10

## 2025-07-09 RX ORDER — PROCHLORPERAZINE MALEATE 10 MG
10 TABLET ORAL EVERY 6 HOURS PRN
Status: DISCONTINUED | OUTPATIENT
Start: 2025-07-09 | End: 2025-07-21 | Stop reason: HOSPADM

## 2025-07-09 RX ORDER — AMOXICILLIN 250 MG
1 CAPSULE ORAL 2 TIMES DAILY PRN
Status: DISCONTINUED | OUTPATIENT
Start: 2025-07-09 | End: 2025-07-21 | Stop reason: HOSPADM

## 2025-07-09 RX ORDER — CEFTRIAXONE 2 G/1
2 INJECTION, POWDER, FOR SOLUTION INTRAMUSCULAR; INTRAVENOUS EVERY 24 HOURS
Status: DISCONTINUED | OUTPATIENT
Start: 2025-07-10 | End: 2025-07-12

## 2025-07-09 RX ORDER — ENOXAPARIN SODIUM 100 MG/ML
40 INJECTION SUBCUTANEOUS EVERY 24 HOURS
Status: DISCONTINUED | OUTPATIENT
Start: 2025-07-09 | End: 2025-07-21 | Stop reason: HOSPADM

## 2025-07-09 RX ORDER — IBUPROFEN 200 MG
200 TABLET ORAL EVERY 4 HOURS PRN
Status: ON HOLD | COMMUNITY
End: 2025-07-21

## 2025-07-09 RX ORDER — FLUTICASONE FUROATE AND VILANTEROL 100; 25 UG/1; UG/1
1 POWDER RESPIRATORY (INHALATION) DAILY
Status: DISCONTINUED | OUTPATIENT
Start: 2025-07-09 | End: 2025-07-21 | Stop reason: HOSPADM

## 2025-07-09 RX ORDER — ALBUTEROL SULFATE 90 UG/1
2 INHALANT RESPIRATORY (INHALATION) EVERY 6 HOURS PRN
Status: DISCONTINUED | OUTPATIENT
Start: 2025-07-09 | End: 2025-07-21 | Stop reason: HOSPADM

## 2025-07-09 RX ORDER — ASPIRIN 81 MG/1
81 TABLET, CHEWABLE ORAL DAILY
Status: DISCONTINUED | OUTPATIENT
Start: 2025-07-10 | End: 2025-07-21 | Stop reason: HOSPADM

## 2025-07-09 RX ORDER — MULTIPLE VITAMINS W/ MINERALS TAB 9MG-400MCG
1 TAB ORAL DAILY
Status: DISCONTINUED | OUTPATIENT
Start: 2025-07-10 | End: 2025-07-12

## 2025-07-09 RX ORDER — ACETAMINOPHEN 650 MG/1
650 SUPPOSITORY RECTAL EVERY 4 HOURS PRN
Status: DISCONTINUED | OUTPATIENT
Start: 2025-07-09 | End: 2025-07-21 | Stop reason: HOSPADM

## 2025-07-09 RX ORDER — DOXYCYCLINE 100 MG/1
100 CAPSULE ORAL EVERY 12 HOURS SCHEDULED
Status: CANCELLED | OUTPATIENT
Start: 2025-07-10 | End: 2025-07-14

## 2025-07-09 RX ORDER — ATROPINE SULFATE 0.1 MG/ML
0.3 INJECTION INTRAVENOUS
Status: DISCONTINUED | OUTPATIENT
Start: 2025-07-09 | End: 2025-07-12

## 2025-07-09 RX ORDER — ROSUVASTATIN CALCIUM 10 MG/1
20 TABLET, COATED ORAL DAILY
Status: DISCONTINUED | OUTPATIENT
Start: 2025-07-10 | End: 2025-07-21 | Stop reason: HOSPADM

## 2025-07-09 RX ORDER — PANTOPRAZOLE SODIUM 40 MG/1
40 TABLET, DELAYED RELEASE ORAL DAILY
Status: DISCONTINUED | OUTPATIENT
Start: 2025-07-10 | End: 2025-07-21 | Stop reason: HOSPADM

## 2025-07-09 RX ORDER — ACETAMINOPHEN 500 MG
500-1000 TABLET ORAL EVERY 6 HOURS PRN
Status: DISCONTINUED | OUTPATIENT
Start: 2025-07-09 | End: 2025-07-09

## 2025-07-09 RX ORDER — IOPAMIDOL 755 MG/ML
84 INJECTION, SOLUTION INTRAVASCULAR ONCE
Status: COMPLETED | OUTPATIENT
Start: 2025-07-09 | End: 2025-07-09

## 2025-07-09 RX ORDER — POLYETHYLENE GLYCOL 3350 17 G/17G
17 POWDER, FOR SOLUTION ORAL 2 TIMES DAILY PRN
Status: DISCONTINUED | OUTPATIENT
Start: 2025-07-09 | End: 2025-07-21 | Stop reason: HOSPADM

## 2025-07-09 RX ORDER — PREDNISONE 5 MG/1
5 TABLET ORAL DAILY
Status: DISCONTINUED | OUTPATIENT
Start: 2025-07-10 | End: 2025-07-15

## 2025-07-09 RX ORDER — ISOSORBIDE MONONITRATE 60 MG/1
60 TABLET, EXTENDED RELEASE ORAL DAILY
Status: DISCONTINUED | OUTPATIENT
Start: 2025-07-10 | End: 2025-07-21 | Stop reason: HOSPADM

## 2025-07-09 RX ORDER — METHYLPREDNISOLONE SODIUM SUCCINATE 125 MG/2ML
125 INJECTION INTRAMUSCULAR; INTRAVENOUS ONCE
Status: COMPLETED | OUTPATIENT
Start: 2025-07-09 | End: 2025-07-09

## 2025-07-09 RX ORDER — DEXTROMETHORPHAN HYDROBROMIDE AND GUAIFENESIN 10; 200 MG/5ML; MG/5ML
10-30 LIQUID ORAL EVERY 6 HOURS PRN
COMMUNITY

## 2025-07-09 RX ORDER — CALCIUM CARBONATE 500 MG/1
1000 TABLET, CHEWABLE ORAL 4 TIMES DAILY PRN
Status: DISCONTINUED | OUTPATIENT
Start: 2025-07-09 | End: 2025-07-21 | Stop reason: HOSPADM

## 2025-07-09 RX ORDER — AMOXICILLIN 250 MG
2 CAPSULE ORAL 2 TIMES DAILY PRN
Status: DISCONTINUED | OUTPATIENT
Start: 2025-07-09 | End: 2025-07-21 | Stop reason: HOSPADM

## 2025-07-09 RX ORDER — CALCIUM CITRATE/VITAMIN D3 315MG-6.25
1 TABLET ORAL DAILY
Status: DISCONTINUED | OUTPATIENT
Start: 2025-07-10 | End: 2025-07-12

## 2025-07-09 RX ORDER — HYDROXYZINE HYDROCHLORIDE 25 MG/1
25 TABLET, FILM COATED ORAL
Status: DISCONTINUED | OUTPATIENT
Start: 2025-07-09 | End: 2025-07-21 | Stop reason: HOSPADM

## 2025-07-09 RX ORDER — ENTECAVIR 0.5 MG/1
0.5 TABLET, FILM COATED ORAL DAILY
Status: DISCONTINUED | OUTPATIENT
Start: 2025-07-10 | End: 2025-07-21 | Stop reason: HOSPADM

## 2025-07-09 RX ORDER — LIDOCAINE 40 MG/G
CREAM TOPICAL
Status: DISCONTINUED | OUTPATIENT
Start: 2025-07-09 | End: 2025-07-21 | Stop reason: HOSPADM

## 2025-07-09 RX ORDER — FLUOXETINE HYDROCHLORIDE 40 MG/1
40 CAPSULE ORAL DAILY
Status: DISCONTINUED | OUTPATIENT
Start: 2025-07-09 | End: 2025-07-09

## 2025-07-09 RX ORDER — IPRATROPIUM BROMIDE AND ALBUTEROL SULFATE 2.5; .5 MG/3ML; MG/3ML
3 SOLUTION RESPIRATORY (INHALATION)
Status: DISCONTINUED | OUTPATIENT
Start: 2025-07-09 | End: 2025-07-10

## 2025-07-09 RX ORDER — FUROSEMIDE 20 MG/1
20 TABLET ORAL DAILY PRN
Status: DISCONTINUED | OUTPATIENT
Start: 2025-07-09 | End: 2025-07-09

## 2025-07-09 RX ORDER — CEFTRIAXONE 2 G/1
2 INJECTION, POWDER, FOR SOLUTION INTRAMUSCULAR; INTRAVENOUS ONCE
Status: COMPLETED | OUTPATIENT
Start: 2025-07-09 | End: 2025-07-09

## 2025-07-09 RX ORDER — IOPAMIDOL 755 MG/ML
61 INJECTION, SOLUTION INTRAVASCULAR ONCE
Status: COMPLETED | OUTPATIENT
Start: 2025-07-09 | End: 2025-07-09

## 2025-07-09 RX ORDER — ACETAMINOPHEN 325 MG/1
650 TABLET ORAL EVERY 4 HOURS PRN
Status: DISCONTINUED | OUTPATIENT
Start: 2025-07-09 | End: 2025-07-21 | Stop reason: HOSPADM

## 2025-07-09 RX ADMIN — METHYLPREDNISOLONE SODIUM SUCCINATE 125 MG: 125 INJECTION INTRAMUSCULAR; INTRAVENOUS at 18:30

## 2025-07-09 RX ADMIN — PROCHLORPERAZINE MALEATE 10 MG: 10 TABLET ORAL at 16:07

## 2025-07-09 RX ADMIN — SODIUM CHLORIDE 1000 ML: 9 INJECTION, SOLUTION INTRAVENOUS at 11:01

## 2025-07-09 RX ADMIN — IPRATROPIUM BROMIDE AND ALBUTEROL SULFATE 3 ML: .5; 3 SOLUTION RESPIRATORY (INHALATION) at 19:34

## 2025-07-09 RX ADMIN — IOPAMIDOL 61 ML: 755 INJECTION, SOLUTION INTRAVENOUS at 21:46

## 2025-07-09 RX ADMIN — MYCOPHENOLATE MOFETIL 750 MG: 250 CAPSULE ORAL at 20:22

## 2025-07-09 RX ADMIN — IPRATROPIUM BROMIDE AND ALBUTEROL SULFATE 3 ML: .5; 3 SOLUTION RESPIRATORY (INHALATION) at 16:18

## 2025-07-09 RX ADMIN — AZITHROMYCIN MONOHYDRATE 500 MG: 500 INJECTION, POWDER, LYOPHILIZED, FOR SOLUTION INTRAVENOUS at 12:39

## 2025-07-09 RX ADMIN — ENOXAPARIN SODIUM 40 MG: 40 INJECTION SUBCUTANEOUS at 18:37

## 2025-07-09 RX ADMIN — IOPAMIDOL 84 ML: 755 INJECTION, SOLUTION INTRAVENOUS at 12:46

## 2025-07-09 RX ADMIN — CEFTRIAXONE SODIUM 2 G: 2 INJECTION, POWDER, FOR SOLUTION INTRAMUSCULAR; INTRAVENOUS at 12:39

## 2025-07-09 ASSESSMENT — COLUMBIA-SUICIDE SEVERITY RATING SCALE - C-SSRS
6. HAVE YOU EVER DONE ANYTHING, STARTED TO DO ANYTHING, OR PREPARED TO DO ANYTHING TO END YOUR LIFE?: NO
2. HAVE YOU ACTUALLY HAD ANY THOUGHTS OF KILLING YOURSELF IN THE PAST MONTH?: NO
1. IN THE PAST MONTH, HAVE YOU WISHED YOU WERE DEAD OR WISHED YOU COULD GO TO SLEEP AND NOT WAKE UP?: NO

## 2025-07-09 ASSESSMENT — ACTIVITIES OF DAILY LIVING (ADL)
ADLS_ACUITY_SCORE: 59

## 2025-07-09 NOTE — PHARMACY-ADMISSION MEDICATION HISTORY
"Pharmacist Admission Medication History    Admission medication history is complete. The information provided in this note is only as accurate as the sources available at the time of the update.    Information Source(s): Patient, Clinic records, Prescription bottles, and CareEverywhere/SureScripts via in-person    Pertinent Information:   1) Patient brought in 3 inhalers: Advair 250/50 mcg/act inhaler ( 2024), Albuterol 108/mcg/act inhaler ( 2024), and Pulmicort 90 mcg/act inhaler (expires 3/2026). Patient reports he uses inhalers PRN when he's sick with a cold.  2) Patient also brought in Diabetic Tussin OTC product he has been using for cough. Stated he doesn't measure the dose, just swigs from bottle (dextromethorphan 20 mg/guaifenesin 400 mg per 10 mL)   3) Patient alternates between ibuprofen and acetaminophen for pain. Takes ibuprofen when having more joint pain. More recently, reports taking 1 tablet ibuprofen + 1 tablet acetaminophen.   4) Has not needed nitroglycerin SL tablet in \"years\". Educated patient on proper nitroglycerin use and need for frequent refills to ensure product potency since he tends to pocket the product.     Changes made to PTA medication list:  Added: calcium citrate-vitamin D, dextromethorphan-guaifenesin  Deleted: bisacodyl, Golytely, tacrolimus ointment  Changed: None    Allergies reviewed with patient and updates made in EHR: yes    Medication History Completed By: Socorro Estrada RPH 2025 11:09 AM    PTA Med List   Medication Sig Last Dose/Taking    acetaminophen (TYLENOL) 500 MG tablet Take 500-1,000 mg by mouth every 6 hours as needed for mild pain or fever. Past Week    albuterol (PROAIR HFA) 108 (90 Base) MCG/ACT inhaler Inhale 2 puffs into the lungs every 6 hours as needed for shortness of breath / dyspnea or wheezing 2025    aspirin 81 MG EC tablet Take 1 tablet (81 mg) by mouth daily 2025 Morning    budesonide (PULMICORT FLEXHALER) 90 MCG/ACT " inhaler Inhale 2 puffs into the lungs 2 times daily as needed (shortness of breath) 7/8/2025    Calcium Citrate-Vitamin D (CALCIUM CITRATE + D PO) Take 1 tablet by mouth daily. 7/8/2025 Morning    clindamycin (CLEOCIN) 150 MG capsule TAKE 2 CAPSULES BY MOUTH 1 HOUR PRIOR TO DENTAL APPOINTMENT. TAKE 1 CAPSULE BY MOUTH EVERY 6 HOURS AFTER APPOINTMENT More than a month    Dextromethorphan-guaiFENesin (DIABETIC TUSSIN MAX ST)  MG/5ML LIQD Take 10-30 mLs by mouth every 6 hours as needed (cough). 7/8/2025    entecavir (BARACLUDE) 0.5 MG tablet Take 1 tablet (0.5 mg) by mouth daily. 7/8/2025 Morning    FLUoxetine (PROZAC) 20 MG capsule Take 1 capsule (20 mg) by mouth daily. With 40mg for a total daily dose of 60mg 7/8/2025 Morning    FLUoxetine (PROZAC) 40 MG capsule Take 1 capsule (40 mg) by mouth daily. 7/8/2025 Morning    fluticasone-salmeterol (ADVAIR) 250-50 MCG/ACT inhaler Inhale 1 puff into the lungs 2 times daily (Patient taking differently: Inhale 1 puff into the lungs 2 times daily as needed.) 7/8/2025    furosemide (LASIX) 20 MG tablet Take 1 tablet (20 mg) by mouth daily as needed (fluid retention) More than a month    hydrOXYzine HCl (ATARAX) 10 MG tablet Take 1 tablet (10 mg) by mouth daily as needed for anxiety More than a month    hydrOXYzine HCl (ATARAX) 25 MG tablet Take 1 tablet (25 mg) by mouth nightly as needed for anxiety (sleep) More than a month    ibuprofen (ADVIL/MOTRIN) 200 MG tablet Take 200 mg by mouth every 4 hours as needed (joint pain). Past Week    isosorbide mononitrate (IMDUR) 60 MG 24 hr tablet Take 1 tablet (60 mg) by mouth daily. 7/8/2025 Morning    latanoprost (XALATAN) 0.005 % ophthalmic solution Place 1 drop into both eyes At Bedtime More than a month    metoprolol succinate ER (TOPROL XL) 25 MG 24 hr tablet Take 0.5 tablets (12.5 mg) by mouth daily. 7/8/2025 Morning    Multiple Vitamins-Iron (ONE DAILY MULTIVITAMIN/IRON) TABS Take 1 tablet by mouth daily 7/8/2025 Morning     mycophenolate (GENERIC EQUIVALENT) 250 MG capsule Take 3 capsules (750 mg) by mouth 2 times daily. 7/8/2025 Morning    naltrexone (DEPADE/REVIA) 50 MG tablet Take 1 tablet (50 mg) by mouth daily. 7/8/2025 Morning    nitroGLYcerin (NITROSTAT) 0.4 MG sublingual tablet Place 1 tablet (0.4 mg) under the tongue every 5 minutes as needed for chest pain More than a month    Omega-3 Fatty Acids (OMEGA 3 PO) Take 1 capsule by mouth daily Dose unknown 7/8/2025 Morning    pantoprazole (PROTONIX) 40 MG EC tablet TAKE 1 TABLET BY MOUTH EVERY DAY 7/8/2025 Morning    polyethylene glycol (MIRALAX) 17 g packet Take 17 g by mouth daily as needed  Unknown    predniSONE (DELTASONE) 5 MG tablet Take 1 tablet (5 mg) by mouth daily. 7/8/2025 Morning    rosuvastatin (CRESTOR) 20 MG tablet Take 1 tablet (20 mg) by mouth daily. 7/8/2025 Morning

## 2025-07-09 NOTE — ED TRIAGE NOTES
Pt arrived via triage.pt reports sob and dizzyness started 3 days ago, worse last night. Oxygen is 89-92%. Has been using inhalers.     Triage Assessment (Adult)       Row Name 07/09/25 1014          Triage Assessment    Airway WDL WDL        Respiratory WDL    Respiratory WDL X  sob        Skin Circulation/Temperature WDL    Skin Circulation/Temperature WDL WDL        Cardiac WDL    Cardiac WDL WDL        Peripheral/Neurovascular WDL    Peripheral Neurovascular WDL WDL        Cognitive/Neuro/Behavioral WDL    Cognitive/Neuro/Behavioral WDL WDL

## 2025-07-09 NOTE — ED PROVIDER NOTES
[place] EMERGENCY DEPARTMENT ([facility])    7/09/25       ED PROVIDER NOTE       History     Chief Complaint   Patient presents with    Shortness of Breath     HPI  Jerad Ross is a 65 year old male with PMH of CHF, HTN, NSTEMI, COPD, chronic hepatitis B, renal transplant 10/6/1993 (Kidney), 4/18/1978 (Kidney) who presents to the ED with SOB and dizziness starting 3 days ago. Patient reports a cold and sore throat about three days ago. Since then SOB and dizziness have worsened. Dizziness is increased with walking. Has been using prn inhalers at home since new SOB. He has not taken any meds today. Not on O2 at home. Denies fever and swelling.   History of bypass surgery.      Past Medical History  Past Medical History:   Diagnosis Date    Acute midline low back pain without sciatica     AION (acute ischemic optic neuropathy)     Anemia in chronic renal disease     Anxiety     Arthritis 1978    Post transplant    Avascular necrosis of bones of both hips (H)     s/p bilateral hip replacements    Avascular necrosis of bones of both hips (H)     s/p bilateral hip replacements    Basal cell carcinoma     Cataract     Chronic hepatitis B (H)     Chronic osteoarthritis 1978    Post transplant    Clostridium difficile colitis     COPD (chronic obstructive pulmonary disease) (H)     Coronary artery disease involving native coronary artery of native heart without angina pectoris 06/17/2014    Coronary angiogram 6/17/14: Severe distal 3-vessel disease involving small vessels, not amenable to PCI or CABG.    CRP elevated     Depression     Depressive disorder 1978    Post transplant    Dialysis patient     Diverticulosis     Dyslipidemia     Elevated C-reactive protein (CRP)     FSGS (focal segmental glomerulosclerosis)     FSGS (focal segmental glomerulosclerosis)     Gastric ulcer with hemorrhage 02/12/2012    Gastric ulcer with hemorrhage 02/12/2012    GERD (gastroesophageal reflux disease)     Glaucoma      OHTN    Glaucoma     Hemorrhoids     History of blood transfusion     HTN (hypertension)     Hyperlipidemia 1978    Hypertension secondary to other renal disorders     Hypogonadism in male     Immunosuppressed status     Immunosuppression     Kidney replaced by transplant     focal glomerulosclerosis     Kidney transplanted     focal glomerulosclerosis     NSTEMI (non-ST elevated myocardial infarction) (H) 06/17/2014    NSTEMI (non-ST elevated myocardial infarction) (H) 06/17/2014    JULIANE (obstructive sleep apnea)     Doesn't use CPAP    Paracentral scotoma     LE    Renal failure     Secondary renal hyperparathyroidism     Septic bursitis     Squamous cell carcinoma     Uncomplicated asthma 2003    Well controled     Past Surgical History:   Procedure Laterality Date    APPENDECTOMY      ARTHROPLASTY REVISION HIP Right 06/19/2023    Procedure: RIGHT HIP REVISION;  Surgeon: Juan Mcdonough MD;  Location:  OR    BIOPSY      Cardiac Bypass surgery  06/08/2020    [facility]     CATARACT EXTRACTION EXTRACAPSULAR W/ INTRAOCULAR LENS IMPLANTATION Bilateral 4-20-10, 6-1-10    CATARACT IOL, RT/LT  04/19/2000    RE    CATARACT IOL, RT/LT  06/01/2000    LE    COLECTOMY PARTIAL  01/01/1983     10 cm, diverticulitis     COLECTOMY SUBTOTAL  1983    10 cm, diverticulitis    COLONOSCOPY  02/13/2012    Procedure:COLONOSCOPY; Surgeon:SLOAN GALLARDO; Location: GI    COLONOSCOPY N/A 01/22/2020    Procedure: Colonoscopy, With Polypectomy And Biopsy;  Surgeon: Aston Kiran MD;  Location:  GI    COLONOSCOPY N/A 06/05/2025    Procedure: Colonoscopy;  Surgeon: Lina Claros MD;  Location:  GI    CV CORONARY ANGIOGRAM N/A 06/02/2020    Procedure: Coronary Angiogram;  Surgeon: Alona Florentino MD;  Location: [facility] Cath Lab;  Service: Cardiology    CV CORONARY ANGIOGRAM N/A 09/20/2021    Procedure: Coronary Angiogram;  Surgeon: Adam Agudelo MD;  Location: [facility] CATH LAB CV    CV  LEFT HEART CATHETERIZATION WITHOUT LEFT VENTRICULOGRAM Left 06/02/2020    Procedure: Left Heart Catheterization Without Left Ventriculogram;  Surgeon: Alona Florentino MD;  Location: [facility] Cath Lab;  Service: Cardiology    CV SUPRAVALVULAR AORTOGRAM N/A 09/20/2021    Procedure: Supravalvular Aortagram;  Surgeon: Adam Agudelo MD;  Location: [facility] CATH LAB CV    ESOPHAGOSCOPY, GASTROSCOPY, DUODENOSCOPY (EGD), COMBINED  02/13/2012    Procedure:COMBINED ESOPHAGOSCOPY, GASTROSCOPY, DUODENOSCOPY (EGD); Surgeon:SLOAN GALLARDO; Location: GI    ESOPHAGOSCOPY, GASTROSCOPY, DUODENOSCOPY (EGD), COMBINED  02/13/2012    EXTRACAPSULAR CATARACT EXTRATION WITH INTRAOCULAR LENS IMPLANT  4-20-10, 6-1-10    Rt, Lt    HERNIA REPAIR  1995    Lt inguinal    HERNIA REPAIR  1995    Lt inguinal    HIP SURGERY      1981, bilat MITZI, revised 2001, 2005    HIP SURGERY  01/01/1981    bilat MITZI, revised 2001, 2005    IR FINE NEEDLE ASPIRATION W ULTRASOUND  04/10/2023    JOINT REPLACEMENT      KIDNEY SURGERY  1978 and 1993    transplant    KNEE SURGERY  1983, 1987, 2001    bilat TKA    MOHS MICROGRAPHIC PROCEDURE      MOHS MICROGRAPHIC PROCEDURE      ORTHOPEDIC SURGERY      OTHER SURGICAL HISTORY      kidney [id number], 1993    PHACOEMULSIFICATION CLEAR CORNEA WITH STANDARD INTRAOCULAR LENS IMPLANT Right 04/19/2000    PHACOEMULSIFICATION CLEAR CORNEA WITH STANDARD INTRAOCULAR LENS IMPLANT Left 06/01/2000    VT BOWEL TO BOWEL ANASTOMOSIS      VT CARDIAC SURG PROCEDURE UNLISTED  2020    3x bypass    VT PELVIS/HIP JOINT SURGERY UNLISTED  1981, 1996, 2005, 2023    Both hips, L 1x rev. R 2x rev.    VT STOMACH SURGERY PROCEDURE UNLISTED  1978    Splenectomy    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, auxiliary spleen    TONSILLECTOMY      TRANSPLANT      VASCULAR SURGERY  1976     acetaminophen (TYLENOL) 500 MG tablet  albuterol (PROAIR HFA) 108 (90 Base) MCG/ACT inhaler  aspirin 81 MG EC tablet  bisacodyl (DULCOLAX) 5 MG EC  tablet  budesonide (PULMICORT FLEXHALER) 90 MCG/ACT inhaler  clindamycin (CLEOCIN) 150 MG capsule  entecavir (BARACLUDE) 0.5 MG tablet  FLUoxetine (PROZAC) 20 MG capsule  FLUoxetine (PROZAC) 40 MG capsule  fluticasone-salmeterol (ADVAIR) 250-50 MCG/ACT inhaler  furosemide (LASIX) 20 MG tablet  hydrOXYzine HCl (ATARAX) 10 MG tablet  hydrOXYzine HCl (ATARAX) 25 MG tablet  isosorbide mononitrate (IMDUR) 60 MG 24 hr tablet  latanoprost (XALATAN) 0.005 % ophthalmic solution  metoprolol succinate ER (TOPROL XL) 25 MG 24 hr tablet  Multiple Vitamins-Iron (ONE DAILY MULTIVITAMIN/IRON) TABS  mycophenolate (GENERIC EQUIVALENT) 250 MG capsule  naltrexone (DEPADE/REVIA) 50 MG tablet  nitroGLYcerin (NITROSTAT) 0.4 MG sublingual tablet  Omega-3 Fatty Acids (OMEGA 3 PO)  pantoprazole (PROTONIX) 40 MG EC tablet  polyethylene glycol (GOLYTELY) 236 g suspension  polyethylene glycol (MIRALAX) 17 g packet  predniSONE (DELTASONE) 20 MG tablet  predniSONE (DELTASONE) 5 MG tablet  rosuvastatin (CRESTOR) 20 MG tablet  tacrolimus (PROTOPIC) 0.1 % external ointment      Allergies   Allergen Reactions    Penicillins Shortness Of Breath and Hives    Keflex [Cephalexin Hcl] Unknown     Patient could not recall reaction    Sulfa Antibiotics Rash    Tetracycline Unknown     Patient could not recall reaction     Family History  Family History   Problem Relation Age of Onset    Asthma Mother     Arthritis Mother     Cardiovascular Father         AI with valve repair    Hypertension Father     Kidney Disease Father     Other - See Comments Father         AI with valve repair    Hyperlipidemia Father     Heart Disease Father     Anxiety Disorder Maternal Grandmother     Depression Maternal Grandmother     Breast Cancer Maternal Grandmother     Substance Abuse Maternal Grandfather     Cerebrovascular Disease Maternal Grandfather     Other - See Comments Maternal Grandfather         cerebrovascular disease    Depression Maternal Grandfather      Dementia Maternal Grandfather     Heart Disease Maternal Grandfather     Cancer Paternal Grandmother         ovarian ca    Ovarian Cancer Paternal Grandmother     Depression Paternal Grandmother     Uterine Cancer Paternal Grandmother     Cerebrovascular Disease Paternal Grandfather     Dementia Paternal Grandfather     Heart Disease Paternal Grandfather     Kidney Disease Paternal Aunt     Kidney Disease Paternal Aunt     Skin Cancer No family hx of     Glaucoma No family hx of     Macular Degeneration No family hx of     Amblyopia No family hx of     Melanoma No family hx of     Diabetes No family hx of      Social History   Social History     Tobacco Use    Smoking status: Former     Current packs/day: 0.00     Average packs/day: 1 pack/day for 9.0 years (9.0 ttl pk-yrs)     Types: Cigarettes     Start date: 1980     Quit date: 1988     Years since quittin.5     Passive exposure: Never    Smokeless tobacco: Former   Vaping Use    Vaping status: Never Used   Substance Use Topics    Alcohol use: Not Currently     Comment: quit drinking 2023    Drug use: Not Currently     Types: Oxycodone      Past medical history, past surgical history, medications, allergies, family history, and social history were reviewed with the patient. No additional pertinent items.   A complete review of systems was performed with pertinent positives and negatives noted in the HPI, and all other systems negative.    Physical Exam   BP: 111/67  Pulse: 80  Temp: 97.9  F (36.6  C)  Resp: 18  SpO2: (!) 90 %  Physical Exam  Constitutional:       General: He is not in acute distress.     Appearance: Normal appearance. He is not toxic-appearing.   HENT:      Head: Atraumatic.   Eyes:      General: No scleral icterus.     Conjunctiva/sclera: Conjunctivae normal.   Cardiovascular:      Rate and Rhythm: Normal rate.      Heart sounds: Normal heart sounds.   Pulmonary:      Effort: Pulmonary effort is normal. No respiratory distress.       Comments: Slightly Coarse breath sounds bilaterally  Abdominal:      Palpations: Abdomen is soft.      Tenderness: There is no abdominal tenderness.   Musculoskeletal:         General: No deformity.      Cervical back: Neck supple.      Right lower leg: No edema.      Left lower leg: No edema.   Skin:     General: Skin is warm.   Neurological:      Mental Status: He is alert.           ED Course, Procedures, & Data      Procedures            EKG Interpretation:      Interpreted by Lindsay Wolff  Time reviewed: 10:52 AM   Symptoms at time of EKG: Shortness of breath   Rhythm: Sinus rhythm with frequent and consecutive premature ventricular complexes  Rate: 85 bpm  Axis: Normal  Ectopy: Premature ventricular complexes  Conduction: Normal  ST Segments/ T Waves: No ST-T wave changes and No acute ischemic changes  Q Waves: None  Comparison to prior: Similar to prior    Clinical Impression: Similar to prior                Medications - No data to display       Critical care was not performed.     Medical Decision Making  The patient's presentation was of high complexity (an acute health issue posing potential threat to life or bodily function).    The patient's evaluation involved:  review of external note(s) from 3+ sources (see separate area of note for details)  review of 3+ test result(s) ordered prior to this encounter (see separate area of note for details)  strong consideration of a test (CT PE) that was ultimately deferred  ordering and/or review of 3+ test(s) in this encounter (see separate area of note for details)  discussion of management or test interpretation with another health professional (hospitalist)    The patient's management necessitated high risk (a decision regarding hospitalization).    Assessment & Plan    Patient arrived to the emergency room for weakness and shortness of breath and labs as reviewed and interpreted by me are significant for a slightly elevated proBNP of 830, elevated CRP  of 181, high-sensitivity troponin of 33, pending repeat, ESR of 27, and a lactic acid of 3.3,   Chest x-ray read as pulmonary edema, but CT PE study pending  Bedside echocardiogram did not show any pericardial effusion, 1-2 B-lines, grossly normal ejection fraction  Consider CT PE study, but when I called CT, CT chest with contrast already done, so endorses to inpatient team and will defer further workup  Considered renal transplant ultrasound, but as patient has reassuring creatinine, will defer this to inpatient team as well  Ordered IV fluids, blood cultures, and antibiotics to cover for community-acquired pneumonia after discussion with pharmacy  Discussed case with internal medicine physician who accepted patient for admission      I have reviewed the nursing notes. I have reviewed the findings, diagnosis, plan and need for follow up with the patient.    New Prescriptions    No medications on file       Final diagnoses:   None   I, Tessa Rivera, am serving as a trained medical scribe to document services personally performed by Stephan Cooper MD based on the provider's statements to me on July 9, 2025.  This document has been checked and approved by the attending provider.    IStephan MD, was physically present and have reviewed and verified the accuracy of this note documented by Tessa Rivera medical scribe.      Stephan Cooper MD   [facility] EMERGENCY DEPARTMENT  7/9/2025     Stephan Cooper MD  07/09/25 0485

## 2025-07-09 NOTE — PROGRESS NOTES
RN entered to room to see pt with increased respiratory rate and increased work of breathing. Rhonchi heard upon auscultation. RN left room to call rapid response when attending doctor approached. RN discussed patient's condition with attending. Nebulizer treatment was ordered and RT called.

## 2025-07-09 NOTE — PROGRESS NOTES
Talked with Pt. About using CPAP at home for sleep. Per Pt. He does not use any CPAP/BIPAP at home

## 2025-07-09 NOTE — H&P
[facility]    History and Physical - Medicine Service, MAROON TEAM 2       Date of Admission:  7/9/2025    Assessment & Plan      65 year old male with PMHx of NSTEMI, CAD s/p CABG (2020), possible COPD, on chronic immunosuppression s/p renal transplant x2 presenting with worsening dyspnea and cough found to have rhinovirus infection and bilateral ground glass opacities c/f possible viral vs atypical pneumonia, admitted for AHRF secondary to undifferentiated pneumonia.     #AHRF  #C/f viral vs atypical pneumonia  Patient presenting with worsening dyspnea at rest and nonproductive cough for past 3 days. Endorses associated sore throat and fatigue. Denies fever, chills, hemoptysis, sick contacts, recent travel. Hx of chronic immunosuppression on MMF and prednisone for renal transplant. Hypoxic, to 90% on RA, upon presentation to ED, otherwise hemodynamically stable. Mild Leukocytosis and elevated inflammatory markers. BC x 2 pending. VBG without acid base disturbance. ABG with clinically low paO2 concerning for hypoxia. RPP positive for rhinovirus/enterovirus. Strep pneumo and legionella urine antigen negative. CT chest w contrast with nonspecific bilateral ground glass opacities c/f viral vs atypical pneumonia. Patient placed on BiPAP, 10/5, FiO2 50%, for recurrent tachypneic episodes, RR to 39, and desaturations to upper 80s while on HFNC. Suspect clinical presentation secondary viral or atypical pneumonia. Given ongoing infectious workup, will continue empiric IV antibiotic course for CAP coverage.     Plan:  -Continue Ceftriaxone (7/9-7/11*)  -Azithromycin discontinued due to prolonged Qtc and negative atypical workup.  -Scheduled duonebs 4 time daily  -Solumedrol 125mg IV today, start prednisone 40mg PO daily tomorrow  -Sputum cx pending  -MRSA PCR negative  -Pulm consult for atypical infection:   -no bronchoscopy planned    #?COPD  Reviewed PFTs from 4/2018  showed moderate restriction with FVC 63%. Reports a 5 pack year smoking history. Unclear if patient has obstructive lung disease. Diagnosis of COPD appears to come from inaccurate health form filled out by patient years ago.    Plan:  -Will continue PTA COPD inhalers for now  -PTA Breo Ellipta 1 puff/day  -PTA albuterol PRN    #Hx of renal transplant   #chronic immunosuppression  Hx of renal failure on dialysis at age 16, and kidney transplant, infected with hepatitis B from dialysis.     Plan:  Continue PTA Mycophenolate 750mg TID  Continue PTA Prednisone 5mg daily  Nephrology consult    #Lactic acidosis, improving   Likely secondary to severe infection in setting of AHRF. LA on admission 3.3 --> 2.5. Will continue to trend.     Plan:  -Continue to trend LA    #Elevated troponins, resolved  Likely secondary to demand ischemia in setting of severe infection. Without chest pain or signs of fluid overload on exam. EKG without acute ischemic changes. POC ECHO without pericardial effusion and normal appearing ejection fraction.    Plan:  -Consider repeat formal ECHO if dyspnea not improving with above measures.    #HFpEF  #CAD s/p CABG  #NSTEMI  NSTEMI in 2014. Most recent ECHO from 6/2020 with LVEF of 54%.    Plan:  Hold lasix 20mg daily    #HTN    Plan:  PTA Metoprolol succinate 12.5mg daily  PTA Imdur 60 mg daily  PTA Aspirin 81 mg daily  Holding Nitroglycerin 0.4mg due to patient not taking    #HTN    Plan:  -rosuvastatin 20mg daily    #Chronic hepatitis B    Plan:  -entecavir 0.5mg daily    #GERD    Plan:  -Pantoprazole 40mg daily    #KIM    Plan:  PTA Atarax 10-25mg PRN  PTA Fluoxetine 20mg daily  PTA Fluoxetine 40mg daily        Diet:  Regular diet  DVT Prophylaxis: Enoxaparin (Lovenox) SQ  [place]wood Catheter: Not present  Fluids: none  Lines: None     Cardiac Monitoring: None  Code Status:  DNR/DNI    Clinically Significant Risk Factors Present on Admission                 # Drug Induced Platelet Defect: home  "medication list includes an antiplatelet medication   # Hypertension: Noted on problem list           # Overweight: Estimated body mass index is 26.91 kg/m  as calculated from the following:    Height as of 6/30/25: 1.702 m (5' 7\").    Weight as of 6/30/25: 77.9 kg (171 lb 12.8 oz).              Disposition Plan      Expected Discharge Date: 07/11/2025                The patient's care was discussed with the Attending Physician, Dr. Plasencia.    Alona Whalen,   Medicine Service, [address]  Securely message with eTelemetry (more info)  Text page via [facility] Paging/Directory   See signed in provider for up to date coverage information  ______________________________________________________________________    Chief Complaint     History of Present Illness   65 year old male with PMHx of CAD s/p CABG (2020), HTN, NSTEMI, ?COPD, chronic hepatitis B, renal transplant 10/6/1993 (Kidney), 4/18/1978 (Kidney), avascular necrosis of bilateral hips, GERD who presents to the ED with worsening dyspnea and cough for 3 days found to have rhinovirus infection and bilateral ground glass opacities c/f viral vs atypical pneumonia admitted for AHRF. Patient presents with SOB and nonproductive cough starting 3 days ago. Endorses recent sore throat, fatigue, malaise. Denies fever, chills, chest pain, orthopnea, abdominal pain, dysuria, lower extremity swelling. Patient states his home inhalers somewhat help Upon presentation to the ED, patient hypoxic with, SpO2 90% on 2L, otherwise hemodynamically stable. Laboratory workup in ED remarkable for lactic acidosis, mild leukocytosis, elevated inflammatory markers, elevated pro-BNP, mildly elevated troponins. RPP positive for rhinovirus/enterovirus. UA negative. BC x 2 pending. EKG without acute ischemic changes. CXR with possible pulmonary edema. CT chest w contrast with bilateral ground glass opacities c/f atypical pneumonia. Patient " started on rocephin and azithromycin in ED. Started on duonebs, steroids. Placed on BiPAP due to frequent episodes of tachypnea, RR to 39, and desaturations to upper 80s on HFNC.    Past Medical History    Past Medical History:   Diagnosis Date    Acute midline low back pain without sciatica     AION (acute ischemic optic neuropathy)     Anemia in chronic renal disease     Anxiety     Arthritis 1978    Post transplant    Avascular necrosis of bones of both hips (H)     s/p bilateral hip replacements    Avascular necrosis of bones of both hips (H)     s/p bilateral hip replacements    Basal cell carcinoma     Cataract     Chronic hepatitis B (H)     Chronic osteoarthritis 1978    Post transplant    Clostridium difficile colitis     COPD (chronic obstructive pulmonary disease) (H)     Coronary artery disease involving native coronary artery of native heart without angina pectoris 06/17/2014    Coronary angiogram 6/17/14: Severe distal 3-vessel disease involving small vessels, not amenable to PCI or CABG.    CRP elevated     Depression     Depressive disorder 1978    Post transplant    Dialysis patient     Diverticulosis     Dyslipidemia     Elevated C-reactive protein (CRP)     FSGS (focal segmental glomerulosclerosis)     FSGS (focal segmental glomerulosclerosis)     Gastric ulcer with hemorrhage 02/12/2012    Gastric ulcer with hemorrhage 02/12/2012    GERD (gastroesophageal reflux disease)     Glaucoma     OHTN    Glaucoma     Hemorrhoids     History of blood transfusion     HTN (hypertension)     Hyperlipidemia 1978    Hypertension secondary to other renal disorders     Hypogonadism in male     Immunosuppressed status     Immunosuppression     Kidney replaced by transplant     focal glomerulosclerosis     Kidney transplanted     focal glomerulosclerosis     NSTEMI (non-ST elevated myocardial infarction) (H) 06/17/2014    NSTEMI (non-ST elevated myocardial infarction) (H) 06/17/2014    JULIANE (obstructive sleep apnea)      Doesn't use CPAP    Paracentral scotoma     LE    Renal failure     Secondary renal hyperparathyroidism     Septic bursitis     Squamous cell carcinoma     Uncomplicated asthma 2003    Well controled       Past Surgical History   Past Surgical History:   Procedure Laterality Date    APPENDECTOMY      ARTHROPLASTY REVISION HIP Right 06/19/2023    Procedure: RIGHT HIP REVISION;  Surgeon: Juan Mcdonough MD;  Location:  OR    BIOPSY      Cardiac Bypass surgery  06/08/2020    [facility]     CATARACT EXTRACTION EXTRACAPSULAR W/ INTRAOCULAR LENS IMPLANTATION Bilateral 4-20-10, 6-1-10    CATARACT IOL, RT/LT  04/19/2000    RE    CATARACT IOL, RT/LT  06/01/2000    LE    COLECTOMY PARTIAL  01/01/1983     10 cm, diverticulitis     COLECTOMY SUBTOTAL  1983    10 cm, diverticulitis    COLONOSCOPY  02/13/2012    Procedure:COLONOSCOPY; Surgeon:SLOAN GALLARDO; Location: GI    COLONOSCOPY N/A 01/22/2020    Procedure: Colonoscopy, With Polypectomy And Biopsy;  Surgeon: Aston Kiran MD;  Location:  GI    COLONOSCOPY N/A 06/05/2025    Procedure: Colonoscopy;  Surgeon: Lina Claros MD;  Location:  GI    CV CORONARY ANGIOGRAM N/A 06/02/2020    Procedure: Coronary Angiogram;  Surgeon: Alona Florentino MD;  Location: [facility] Cath Lab;  Service: Cardiology    CV CORONARY ANGIOGRAM N/A 09/20/2021    Procedure: Coronary Angiogram;  Surgeon: Adam Agudelo MD;  Location: [facility]    CV LEFT HEART CATHETERIZATION WITHOUT LEFT VENTRICULOGRAM Left 06/02/2020    Procedure: Left Heart Catheterization Without Left Ventriculogram;  Surgeon: Alona Florentino MD;  Location: [facility] Cath Lab;  Service: Cardiology    CV SUPRAVALVULAR AORTOGRAM N/A 09/20/2021    Procedure: Supravalvular Aortagram;  Surgeon: Adam Agudelo MD;  Location: [facility] CV    ESOPHAGOSCOPY, GASTROSCOPY, DUODENOSCOPY (EGD), COMBINED  02/13/2012    Procedure:COMBINED ESOPHAGOSCOPY, GASTROSCOPY,  DUODENOSCOPY (EGD); Surgeon:SLOAN GALLARDO; Location: GI    ESOPHAGOSCOPY, GASTROSCOPY, DUODENOSCOPY (EGD), COMBINED  02/13/2012    EXTRACAPSULAR CATARACT EXTRATION WITH INTRAOCULAR LENS IMPLANT  4-20-10, 6-1-10    Rt, Lt    HERNIA REPAIR  1995    Lt inguinal    HERNIA REPAIR  1995    Lt inguinal    HIP SURGERY      1981, bilat MITZI, revised 2001, 2005    HIP SURGERY  01/01/1981    bilat MITZI, revised 2001, 2005    IR FINE NEEDLE ASPIRATION W ULTRASOUND  04/10/2023    JOINT REPLACEMENT      KIDNEY SURGERY  1978 and 1993    transplant    KNEE SURGERY  1983, 1987, 2001    bilat TKA    MOHS MICROGRAPHIC PROCEDURE      MOHS MICROGRAPHIC PROCEDURE      ORTHOPEDIC SURGERY      OTHER SURGICAL HISTORY      kidney [id number], 1993    PHACOEMULSIFICATION CLEAR CORNEA WITH STANDARD INTRAOCULAR LENS IMPLANT Right 04/19/2000    PHACOEMULSIFICATION CLEAR CORNEA WITH STANDARD INTRAOCULAR LENS IMPLANT Left 06/01/2000    MD BOWEL TO BOWEL ANASTOMOSIS      MD CARDIAC SURG PROCEDURE UNLISTED  2020    3x bypass    MD PELVIS/HIP JOINT SURGERY UNLISTED  1981, 1996, 2005, 2023    Both hips, L 1x rev. R 2x rev.    MD STOMACH SURGERY PROCEDURE UNLISTED  1978    Splenectomy    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, auxiliary spleen    TONSILLECTOMY      TRANSPLANT      VASCULAR SURGERY  1976       Prior to Admission Medications   Prior to Admission Medications   Prescriptions Last Dose Informant Patient Reported? Taking?   Calcium Citrate-Vitamin D (CALCIUM CITRATE + D PO) 7/8/2025 Morning  Yes Yes   Sig: Take 1 tablet by mouth daily.   Dextromethorphan-guaiFENesin (DIABETIC TUSSIN MAX ST)  MG/5ML LIQD 7/8/2025  Yes Yes   Sig: Take 10-30 mLs by mouth every 6 hours as needed (cough).   FLUoxetine (PROZAC) 20 MG capsule 7/8/2025 Morning  No Yes   Sig: Take 1 capsule (20 mg) by mouth daily. With 40mg for a total daily dose of 60mg   FLUoxetine (PROZAC) 40 MG capsule 7/8/2025 Morning  No Yes   Sig: Take 1 capsule (40 mg) by  mouth daily.   Multiple Vitamins-Iron (ONE DAILY MULTIVITAMIN/IRON) TABS 7/8/2025 Morning Self Yes Yes   Sig: Take 1 tablet by mouth daily   Omega-3 Fatty Acids (OMEGA 3 PO) 7/8/2025 Morning Self Yes Yes   Sig: Take 1 capsule by mouth daily Dose unknown   acetaminophen (TYLENOL) 500 MG tablet Past Week  Yes Yes   Sig: Take 500-1,000 mg by mouth every 6 hours as needed for mild pain or fever.   albuterol (PROAIR HFA) 108 (90 Base) MCG/ACT inhaler 7/8/2025 Self No Yes   Sig: Inhale 2 puffs into the lungs every 6 hours as needed for shortness of breath / dyspnea or wheezing   aspirin 81 MG EC tablet 7/8/2025 Morning  No Yes   Sig: Take 1 tablet (81 mg) by mouth daily   budesonide (PULMICORT FLEXHALER) 90 MCG/ACT inhaler 7/8/2025  Yes Yes   Sig: Inhale 2 puffs into the lungs 2 times daily as needed (shortness of breath)   clindamycin (CLEOCIN) 150 MG capsule More than a month  Yes Yes   Sig: TAKE 2 CAPSULES BY MOUTH 1 HOUR PRIOR TO DENTAL APPOINTMENT. TAKE 1 CAPSULE BY MOUTH EVERY 6 HOURS AFTER APPOINTMENT   entecavir (BARACLUDE) 0.5 MG tablet 7/8/2025 Morning  No Yes   Sig: Take 1 tablet (0.5 mg) by mouth daily.   fluticasone-salmeterol (ADVAIR) 250-50 MCG/ACT inhaler 7/8/2025  No Yes   Sig: Inhale 1 puff into the lungs 2 times daily   Patient taking differently: Inhale 1 puff into the lungs 2 times daily as needed.   furosemide (LASIX) 20 MG tablet More than a month Self No Yes   Sig: Take 1 tablet (20 mg) by mouth daily as needed (fluid retention)   hydrOXYzine HCl (ATARAX) 10 MG tablet More than a month  No Yes   Sig: Take 1 tablet (10 mg) by mouth daily as needed for anxiety   hydrOXYzine HCl (ATARAX) 25 MG tablet More than a month  No Yes   Sig: Take 1 tablet (25 mg) by mouth nightly as needed for anxiety (sleep)   ibuprofen (ADVIL/MOTRIN) 200 MG tablet Past Week  Yes Yes   Sig: Take 200 mg by mouth every 4 hours as needed (joint pain).   isosorbide mononitrate (IMDUR) 60 MG 24 hr tablet 7/8/2025 Morning  No Yes    Sig: Take 1 tablet (60 mg) by mouth daily.   latanoprost (XALATAN) 0.005 % ophthalmic solution More than a month Self No Yes   Sig: Place 1 drop into both eyes At Bedtime   metoprolol succinate ER (TOPROL XL) 25 MG 24 hr tablet 7/8/2025 Morning  No Yes   Sig: Take 0.5 tablets (12.5 mg) by mouth daily.   mycophenolate (GENERIC EQUIVALENT) 250 MG capsule 7/8/2025 Morning  No Yes   Sig: Take 3 capsules (750 mg) by mouth 2 times daily.   naltrexone (DEPADE/REVIA) 50 MG tablet 7/8/2025 Morning  No Yes   Sig: Take 1 tablet (50 mg) by mouth daily.   nitroGLYcerin (NITROSTAT) 0.4 MG sublingual tablet More than a month Self No Yes   Sig: Place 1 tablet (0.4 mg) under the tongue every 5 minutes as needed for chest pain   pantoprazole (PROTONIX) 40 MG EC tablet 7/8/2025 Morning  No Yes   Sig: TAKE 1 TABLET BY MOUTH EVERY DAY   polyethylene glycol (MIRALAX) 17 g packet Unknown Self Yes Yes   Sig: Take 17 g by mouth daily as needed    predniSONE (DELTASONE) 5 MG tablet 7/8/2025 Morning  No Yes   Sig: Take 1 tablet (5 mg) by mouth daily.   rosuvastatin (CRESTOR) 20 MG tablet 7/8/2025 Morning  No Yes   Sig: Take 1 tablet (20 mg) by mouth daily.      Facility-Administered Medications: None         Physical Exam   Vital Signs: Temp: 97.9  F (36.6  C) Temp src: Oral BP: 111/67 Pulse: 80   Resp: 18 SpO2: (!) 90 % O2 Device: Nasal cannula Oxygen Delivery: 2 LPM  Weight: 0 lbs 0 oz    Physical Exam  Vitals reviewed.   Constitutional:       General: He is in acute distress.      Appearance: He is toxic-appearing.   Cardiovascular:      Rate and Rhythm: Normal rate and regular rhythm. No extrasystoles are present.     Heart sounds: No murmur heard.  Pulmonary:      Effort: Tachypnea, accessory muscle usage, prolonged expiration and respiratory distress present.      Breath sounds: Wheezing present.      Comments: Coarse breath sounds bilaterally.  Musculoskeletal:      Right lower leg: No edema.      Left lower leg: No edema.        Medical Decision Making       Please see A&P for additional details of medical decision making.      Data     I have personally reviewed the following data over the past 24 hrs:    12.0 (H)  \   14.0   / 275     140 104 12.4 /  85   3.4 22 0.72 \     ALT: 23 AST: 49 (H) AP: 82 TBILI: 1.0   ALB: 3.5 TOT PROTEIN: 6.6 LIPASE: N/A     Trop: 32 (H) BNP: 830 (H)     Procal: N/A CRP: 181.00 (H) Lactic Acid: 2.5 (H)       INR:  1.03 PTT:  N/A   D-dimer:  N/A Fibrinogen:  N/A       Imaging results reviewed over the past 24 hrs:   Recent Results (from the past 24 hours)   XR Chest Port 1 View    Narrative    Exam: XR CHEST PORT 1 VIEW, 7/9/2025 12:27 PM    Comparison: CT chest from 8/2/2021    History: sob    Findings:  Portable AP view of the semiupright chest. Trachea is midline.  Cardiomediastinal silhouette is stable. No prominent perihilar  opacities, right greater than left. No pneumothorax or pleural  effusion. The visualized upper abdomen is unremarkable. Severe  degenerative change of the left glenohumeral joint with osseous  proliferation.      Impression    Impression:   1. Prominent right perihilar opacities, which can be seen in the  setting of pulmonary edema.  2. Severe degenerative changes of the left shoulder.    I have personally reviewed the examination and initial interpretation  and I agree with the findings.    GASTON CAMARENA MD         SYSTEM ID:  [id number]   CT Chest w Contrast    Narrative    EXAM: CT CHEST W CONTRAST 7/9/2025 1:11 PM     DEMOGRAPHICS: Male of 65 years    INDICATION: sob    COMPARISON: CT chest pulmonary embolus and 8/20/2023    TECHNIQUE: Helical CT imaging of the chest was obtained without  contrast. Multiplanar post-processed and MIP reformats were obtained  reviewed.     FINDINGS:    LINES/TUBES: None.    LUNG: Mosaic/geographic, sharply marginated groundglass opacities  diffusely throughout the right upper lobe, bilateral lower lobes right  greater than left, and apical  anterior left upper lobe with scattered  interlobular septal thickening & superimposed bronchovascular  groundglass nodularity. Questionable subpleural sparing in the right  upper lobe. There is sparing of the right middle lobe.    LARGE AIRWAYS: Patent tracheobronchial tree without intraluminal mass.    PLEURA: No pneumothorax or pleural effusion. No pleural mass or  calcification.    VESSELS: The pulmonary artery and thoracic aorta are normal in  caliber.    HEART: No cardiomegaly. No pericardial effusion. Mild coronary  atherosclerotic calcifications. Limited evaluation of valves secondary  to lack of contrast.     MEDIASTINUM AND ALOK: Postoperative changes CABG with saphenous vein  graft & LIMA to LAD. No suspicious lymphadenopathy.     CHEST WALL: Midline sternotomy wires & plate and screw fixation  hardware intact.    LOWER NECK: Thyroid unremarkable.    UPPER ABDOMEN: Autonephrectomy. Left upper quadrant large splenules.  Diverticular disease of the proximal large intestine.    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 Mild thoracic  spine degenerative changes. No acute osseous abnormality. No  suspicious bony lesions. Unremarkable soft tissues. Multiple punctate  sclerotic foci in vertebral column and posterior left rib #8.      Impression    IMPRESSION:   1. Mosaic round glass attenuation with superimposed bronchovascular  groundglass nodules. These findings are nonspecific. Top differential  is ARDS. Differential also includes atypical infection, early diffuse  alveolar hemorrhage, organizing pneumonia or early pulmonary edema.  Recommend pulmonary consultation and if indicated bronchial alveolar  lavage for further differentiation.    I have personally reviewed the examination and initial interpretation  and I agree with the findings.    ESMER BARRAGAN  MD FERNANDO         SYSTEM ID:  [id number]

## 2025-07-10 ENCOUNTER — APPOINTMENT (OUTPATIENT)
Dept: PHYSICAL THERAPY | Facility: CLINIC | Age: 65
DRG: 193 | End: 2025-07-10
Payer: COMMERCIAL

## 2025-07-10 VITALS
TEMPERATURE: 98.8 F | DIASTOLIC BLOOD PRESSURE: 68 MMHG | SYSTOLIC BLOOD PRESSURE: 128 MMHG | OXYGEN SATURATION: 97 % | HEART RATE: 74 BPM | RESPIRATION RATE: 32 BRPM

## 2025-07-10 LAB
ACB COMPLEX DNA BLD POS QL NAA+NON-PROBE: NOT DETECTED
ALBUMIN SERPL BCG-MCNC: 3.2 G/DL (ref 3.5–5.2)
ANION GAP SERPL CALCULATED.3IONS-SCNC: 16 MMOL/L (ref 7–15)
B FRAGILIS DNA BLD POS QL NAA+NON-PROBE: NOT DETECTED
BACTERIA SPEC CULT: ABNORMAL
BACTERIA SPEC CULT: ABNORMAL
BACTERIA SPEC CULT: NORMAL
BUN SERPL-MCNC: 14.9 MG/DL (ref 8–23)
C ALBICANS DNA BLD POS QL NAA+NON-PROBE: NOT DETECTED
C AURIS DNA BLD POS QL NAA+NON-PROBE: NOT DETECTED
C GATTII+NEOFOR DNA BLD POS QL NAA+N-PRB: NOT DETECTED
C GLABRATA DNA BLD POS QL NAA+NON-PROBE: NOT DETECTED
C KRUSEI DNA BLD POS QL NAA+NON-PROBE: NOT DETECTED
C PARAP DNA BLD POS QL NAA+NON-PROBE: NOT DETECTED
C TROPICLS DNA BLD POS QL NAA+NON-PROBE: NOT DETECTED
CALCIUM SERPL-MCNC: 8.9 MG/DL (ref 8.8–10.4)
CHLORIDE SERPL-SCNC: 102 MMOL/L (ref 98–107)
CREAT SERPL-MCNC: 0.68 MG/DL (ref 0.67–1.17)
E CLOAC COMP DNA BLD POS NAA+NON-PROBE: NOT DETECTED
E COLI DNA BLD POS QL NAA+NON-PROBE: NOT DETECTED
E FAECALIS DNA BLD POS QL NAA+NON-PROBE: NOT DETECTED
E FAECIUM DNA BLD POS QL NAA+NON-PROBE: NOT DETECTED
EGFRCR SERPLBLD CKD-EPI 2021: >90 ML/MIN/1.73M2
ENTEROBACTERALES DNA BLD POS NAA+N-PRB: NOT DETECTED
ERYTHROCYTE [DISTWIDTH] IN BLOOD BY AUTOMATED COUNT: 16 % (ref 10–15)
GLUCOSE SERPL-MCNC: 103 MG/DL (ref 70–99)
GP B STREP DNA BLD POS QL NAA+NON-PROBE: NOT DETECTED
HAEM INFLU DNA BLD POS QL NAA+NON-PROBE: NOT DETECTED
HCO3 SERPL-SCNC: 19 MMOL/L (ref 22–29)
HCT VFR BLD AUTO: 41.3 % (ref 40–53)
HGB BLD-MCNC: 13.1 G/DL (ref 13.3–17.7)
K OXYTOCA DNA BLD POS QL NAA+NON-PROBE: NOT DETECTED
KLEBSIELLA SP DNA BLD POS QL NAA+NON-PRB: NOT DETECTED
KLEBSIELLA SP DNA BLD POS QL NAA+NON-PRB: NOT DETECTED
L MONOCYTOG DNA BLD POS QL NAA+NON-PROBE: NOT DETECTED
MAGNESIUM SERPL-MCNC: 1.9 MG/DL (ref 1.7–2.3)
MCH RBC QN AUTO: 28.8 PG (ref 26.5–33)
MCHC RBC AUTO-ENTMCNC: 31.7 G/DL (ref 31.5–36.5)
MCV RBC AUTO: 91 FL (ref 78–100)
MECA+MECC ISLT/SPM QL: DETECTED
N MEN DNA BLD POS QL NAA+NON-PROBE: NOT DETECTED
P AERUGINOSA DNA BLD POS NAA+NON-PROBE: NOT DETECTED
PHOSPHATE SERPL-MCNC: 3.2 MG/DL (ref 2.5–4.5)
PLATELET # BLD AUTO: 252 10E3/UL (ref 150–450)
POTASSIUM SERPL-SCNC: 3.9 MMOL/L (ref 3.4–5.3)
PROCALCITONIN SERPL IA-MCNC: 0.16 NG/ML
PROTEUS SP DNA BLD POS QL NAA+NON-PROBE: NOT DETECTED
RBC # BLD AUTO: 4.55 10E6/UL (ref 4.4–5.9)
S AUREUS DNA BLD POS QL NAA+NON-PROBE: NOT DETECTED
S EPIDERMIDIS DNA BLD POS QL NAA+NON-PRB: DETECTED
S LUGDUNENSIS DNA BLD POS QL NAA+NON-PRB: NOT DETECTED
S MALTOPHILIA DNA BLD POS QL NAA+NON-PRB: NOT DETECTED
S MARCESCENS DNA BLD POS NAA+NON-PROBE: NOT DETECTED
S PNEUM DNA BLD POS QL NAA+NON-PROBE: NOT DETECTED
S PYO DNA BLD POS QL NAA+NON-PROBE: NOT DETECTED
SALMONELLA DNA BLD POS QL NAA+NON-PROBE: NOT DETECTED
SODIUM SERPL-SCNC: 137 MMOL/L (ref 135–145)
STREPTOCOCCUS DNA BLD POS NAA+NON-PROBE: NOT DETECTED
WBC # BLD AUTO: 8.7 10E3/UL (ref 4–11)

## 2025-07-10 PROCEDURE — 85048 AUTOMATED LEUKOCYTE COUNT: CPT

## 2025-07-10 PROCEDURE — 250N000012 HC RX MED GY IP 250 OP 636 PS 637: Performed by: NURSE PRACTITIONER

## 2025-07-10 PROCEDURE — 250N000012 HC RX MED GY IP 250 OP 636 PS 637

## 2025-07-10 PROCEDURE — 999N000157 HC STATISTIC RCP TIME EA 10 MIN

## 2025-07-10 PROCEDURE — 250N000011 HC RX IP 250 OP 636

## 2025-07-10 PROCEDURE — 94640 AIRWAY INHALATION TREATMENT: CPT | Mod: 76

## 2025-07-10 PROCEDURE — 94640 AIRWAY INHALATION TREATMENT: CPT

## 2025-07-10 PROCEDURE — 250N000013 HC RX MED GY IP 250 OP 250 PS 637

## 2025-07-10 PROCEDURE — 82040 ASSAY OF SERUM ALBUMIN: CPT

## 2025-07-10 PROCEDURE — 250N000009 HC RX 250: Performed by: STUDENT IN AN ORGANIZED HEALTH CARE EDUCATION/TRAINING PROGRAM

## 2025-07-10 PROCEDURE — 99233 SBSQ HOSP IP/OBS HIGH 50: CPT | Performed by: STUDENT IN AN ORGANIZED HEALTH CARE EDUCATION/TRAINING PROGRAM

## 2025-07-10 PROCEDURE — 86612 BLASTOMYCES ANTIBODY: CPT | Performed by: STUDENT IN AN ORGANIZED HEALTH CARE EDUCATION/TRAINING PROGRAM

## 2025-07-10 PROCEDURE — 97161 PT EVAL LOW COMPLEX 20 MIN: CPT | Mod: GP

## 2025-07-10 PROCEDURE — 84145 PROCALCITONIN (PCT): CPT | Performed by: STUDENT IN AN ORGANIZED HEALTH CARE EDUCATION/TRAINING PROGRAM

## 2025-07-10 PROCEDURE — 250N000013 HC RX MED GY IP 250 OP 250 PS 637: Performed by: STUDENT IN AN ORGANIZED HEALTH CARE EDUCATION/TRAINING PROGRAM

## 2025-07-10 PROCEDURE — 250N000009 HC RX 250

## 2025-07-10 PROCEDURE — 87385 HISTOPLASMA CAPSUL AG IA: CPT | Performed by: STUDENT IN AN ORGANIZED HEALTH CARE EDUCATION/TRAINING PROGRAM

## 2025-07-10 PROCEDURE — 87449 NOS EACH ORGANISM AG IA: CPT | Performed by: STUDENT IN AN ORGANIZED HEALTH CARE EDUCATION/TRAINING PROGRAM

## 2025-07-10 PROCEDURE — 87040 BLOOD CULTURE FOR BACTERIA: CPT

## 2025-07-10 PROCEDURE — 99223 1ST HOSP IP/OBS HIGH 75: CPT | Mod: FS | Performed by: NURSE PRACTITIONER

## 2025-07-10 PROCEDURE — 36415 COLL VENOUS BLD VENIPUNCTURE: CPT | Performed by: STUDENT IN AN ORGANIZED HEALTH CARE EDUCATION/TRAINING PROGRAM

## 2025-07-10 PROCEDURE — 87305 ASPERGILLUS AG IA: CPT | Performed by: STUDENT IN AN ORGANIZED HEALTH CARE EDUCATION/TRAINING PROGRAM

## 2025-07-10 PROCEDURE — 94660 CPAP INITIATION&MGMT: CPT

## 2025-07-10 PROCEDURE — 97530 THERAPEUTIC ACTIVITIES: CPT | Mod: GP

## 2025-07-10 PROCEDURE — 120N000011 HC R&B TRANSPLANT UMMC

## 2025-07-10 PROCEDURE — 36415 COLL VENOUS BLD VENIPUNCTURE: CPT

## 2025-07-10 PROCEDURE — 83735 ASSAY OF MAGNESIUM: CPT

## 2025-07-10 PROCEDURE — 250N000012 HC RX MED GY IP 250 OP 636 PS 637: Performed by: STUDENT IN AN ORGANIZED HEALTH CARE EDUCATION/TRAINING PROGRAM

## 2025-07-10 RX ORDER — PREDNISONE 20 MG/1
40 TABLET ORAL DAILY
Status: COMPLETED | OUTPATIENT
Start: 2025-07-10 | End: 2025-07-14

## 2025-07-10 RX ORDER — MYCOPHENOLATE MOFETIL 250 MG/1
500 CAPSULE ORAL
Status: DISCONTINUED | OUTPATIENT
Start: 2025-07-10 | End: 2025-07-21 | Stop reason: HOSPADM

## 2025-07-10 RX ORDER — IPRATROPIUM BROMIDE AND ALBUTEROL SULFATE 2.5; .5 MG/3ML; MG/3ML
3 SOLUTION RESPIRATORY (INHALATION) EVERY 4 HOURS
Status: DISCONTINUED | OUTPATIENT
Start: 2025-07-10 | End: 2025-07-12

## 2025-07-10 RX ADMIN — PANTOPRAZOLE SODIUM 40 MG: 40 TABLET, DELAYED RELEASE ORAL at 10:54

## 2025-07-10 RX ADMIN — Medication 1 TABLET: at 10:47

## 2025-07-10 RX ADMIN — IPRATROPIUM BROMIDE AND ALBUTEROL SULFATE 3 ML: .5; 3 SOLUTION RESPIRATORY (INHALATION) at 20:41

## 2025-07-10 RX ADMIN — Medication 1 TABLET: at 10:48

## 2025-07-10 RX ADMIN — IPRATROPIUM BROMIDE AND ALBUTEROL SULFATE 3 ML: .5; 3 SOLUTION RESPIRATORY (INHALATION) at 08:10

## 2025-07-10 RX ADMIN — ASPIRIN 81 MG CHEWABLE TABLET 81 MG: 81 TABLET CHEWABLE at 10:54

## 2025-07-10 RX ADMIN — FLUOXETINE HYDROCHLORIDE 60 MG: 40 CAPSULE ORAL at 10:46

## 2025-07-10 RX ADMIN — NALTREXONE HYDROCHLORIDE 50 MG: 50 TABLET, FILM COATED ORAL at 10:45

## 2025-07-10 RX ADMIN — IPRATROPIUM BROMIDE AND ALBUTEROL SULFATE 3 ML: .5; 3 SOLUTION RESPIRATORY (INHALATION) at 16:19

## 2025-07-10 RX ADMIN — ENTECAVIR 0.5 MG: 0.5 TABLET ORAL at 10:45

## 2025-07-10 RX ADMIN — CEFTRIAXONE SODIUM 2 G: 2 INJECTION, POWDER, FOR SOLUTION INTRAMUSCULAR; INTRAVENOUS at 12:40

## 2025-07-10 RX ADMIN — MYCOPHENOLATE MOFETIL 750 MG: 250 CAPSULE ORAL at 10:49

## 2025-07-10 RX ADMIN — ROSUVASTATIN CALCIUM 20 MG: 20 TABLET, FILM COATED ORAL at 10:55

## 2025-07-10 RX ADMIN — ISOSORBIDE MONONITRATE 60 MG: 60 TABLET, EXTENDED RELEASE ORAL at 10:49

## 2025-07-10 RX ADMIN — MYCOPHENOLATE MOFETIL 500 MG: 250 CAPSULE ORAL at 17:39

## 2025-07-10 RX ADMIN — PREDNISONE 40 MG: 20 TABLET ORAL at 10:48

## 2025-07-10 RX ADMIN — IPRATROPIUM BROMIDE AND ALBUTEROL SULFATE 3 ML: .5; 3 SOLUTION RESPIRATORY (INHALATION) at 11:58

## 2025-07-10 RX ADMIN — ENOXAPARIN SODIUM 40 MG: 40 INJECTION SUBCUTANEOUS at 17:39

## 2025-07-10 ASSESSMENT — ACTIVITIES OF DAILY LIVING (ADL)
ADLS_ACUITY_SCORE: 61
ADLS_ACUITY_SCORE: 59
ADLS_ACUITY_SCORE: 61
ADLS_ACUITY_SCORE: 59
ADLS_ACUITY_SCORE: 61
ADLS_ACUITY_SCORE: 59
ADLS_ACUITY_SCORE: 61
ADLS_ACUITY_SCORE: 59
ADLS_ACUITY_SCORE: 59
ADLS_ACUITY_SCORE: 61
ADLS_ACUITY_SCORE: 59
ADLS_ACUITY_SCORE: 61
ADLS_ACUITY_SCORE: 59
ADLS_ACUITY_SCORE: 59
ADLS_ACUITY_SCORE: 61
ADLS_ACUITY_SCORE: 59
ADLS_ACUITY_SCORE: 59
ADLS_ACUITY_SCORE: 61
ADLS_ACUITY_SCORE: 59
ADLS_ACUITY_SCORE: 59

## 2025-07-10 NOTE — CONSULTS
[facility]  Transplant Nephrology Consult Note  Date of Admission:  7/9/2025  Today's Date: 07/10/2025  Requesting physician: Blake Plasencia MD    Reason for Consult:  DDKT    Recommendations:   - Reduce Mycophenolate to 500mg BID for 5 days while acutely ill (ordered for you). With plan to increase Mycophenolate back to previous 750mg BID with clinical improvement.  - Agree with abx.  - No acute indication for dialysis.     Assessment & Plan   # DDKT: Stable; good urine output.    - Baseline Creatinine: ~ 0.7-0.9   - Proteinuria: Normal (<0.2 grams)   - DSA Hx: Not checked recently due to time from transplant   - Last cPRA: unknown%   - BK Viremia: Not checked recently due to time from transplant   - Kidney Tx Biopsy Hx: No biopsy history.    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     # Immunosuppression: Mycophenolate mofetil (dose 750 mg every 12 hours) and Prednisone (dose 5 mg daily)   - Induction with Recent Transplant:  Not known due to time from transplant   - Continue with intensive monitoring of immunosuppression for efficacy and toxicity.   - Historical Changes in Immunosuppression: None   - Changes: Yes - decrease MMF to 500mg BID until clinical improvement this admission. Reduced on 07/10/25.     # Infection Prevention:   Last CD4 Level: Not checked recently  - PJP: None      - CMV IgG Ab High Risk Discordance (D+/R-) at time of transplant: Unknown  Present CMV Serostatus: Unknown  - EBV IgG Ab High Risk Discordance (D+/R-) at time of transplant: Unknown  Present EBV Serostatus: Unknown    # Hypertension: Borderline control;  Goal BP: < 140/90 (Hospitalization goal)   - Changes: Not at this time    # Anemia in Chronic Renal Disease: Hgb: Stable      CAROL ANN: No   - Iron studies: Not checked recently    # Mineral Bone Disorder:    - Calcium; level: Normal        On supplement: Yes  - Phosphorus; level: Normal        On supplement: No    #  Electrolytes:  - Potassium; level: Normal        On supplement: No  - Magnesium; level: Normal        On supplement: No  - Bicarbonate; level: Low        On supplement: No    # AHRF   Viral versus Atypical Pneumonia  + Rhino/enterovirus. CT PE negative. Gram + Bcx on 07/09, likely contaminant. Repeat blood cultures 07/10. Empiric abx, nebs, solumedrol.    # Other Significant PMH:   - CAD, s/p CABG: Last cardiac stress test was abnormal in 6/2023 (but unchanged from prior testing in 2022).  Coronary angiogram 9/2021 that showed some possible obstructive disease in the 1st diagonal, but unable to stent it.  Bypass grafts were open.  Plan is for medical management.Last cardiac echo (8/2023) showed LVEF ~ 60-65%. Normal right ventricular systolic function. Normal diastolic dysfunction.    - Provoked PE (8/2023): after right hip revision surgery. Completed AC.   - Chronic Hepatitis B: Stable on entecavir. Follows here with hepatology.    - Asthma: Some increase in shortness of breath, but felt more cardiac in origin.  Patient is stable on inhalers.    - GERD: Asymptomatic on pantoprazole, but with h/o ulcer, will continue on medication.   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    Seen and discussed with Dr. Rachell Levine APRN [facility]  Transplant Nephrology  Contact information via Vocera Web Console     Physician Attestation     I saw and evaluated Jerad Ross as part of a shared APRN/PA visit.     I personally reviewed the vital signs, medications, and labs.    I personally provided a substantive portion of care for this patient and I approve the care plan as written by the ALISHA.   I was involved with Medical Decision Making including: Please see A&P for additional details of medical decision making.  MANAGEMENT DISCUSSED with the following over the past 24 hours: No acute indications for dialysis.  With viral infection, would decrease mycophenolate mofetil to 500 mg bid x 5 days.     Nolan Lovett MD  Date of Service (when I saw the patient): 07/10/25    History of Present Illness  Mr Ross is a 65 year old male with a past medical history of FSGS s/p DDKT in 1993 c/b numerous skin cancers. Other past medical history includes CAD s/p CABG, provoked PE (completed AC), chronic Hep B, Asthma, GERD, and recurrent skin cancers. He presented to ER yesterday for worsening shortness of breath and cough. Found to have positive resp PCR for Rhino/Enterovirus with bilateral ground glass opacities on imaging. Managed by medicine. Abx, Nebs, blood cultures.     Mr Ross states he had URI symptoms starting on Monday that progressively worsened into a cough, SOA. He states he is unsure of fever and chills as his house does not have air conditioning. Reports ability to maintain eating and fluids. Denies nausea, vomiting or diarrhea. Denies reduced urine output, hematuria, frequency or dysuria. Denies any new LE swelling. Denies any missed IS doses.     Review of Systems   The 10 point Review of Systems is negative other than noted in the HPI or here.      MEDICATIONS:  Current Facility-Administered Medications   Medication Dose Route Frequency Provider Last Rate Last Admin    aspirin EC tablet 81 mg  81 mg Oral Daily Whalen, Alona, DO   81 mg at 07/10/25 1054    calcium citrate-vitamin D (CITRACAL) 315-6.25 MG-MCG per tablet 1 tablet  1 tablet Oral Daily Whalen, Alona, DO   1 tablet at 07/10/25 1047    cefTRIAXone (ROCEPHIN) 2 g vial to attach to  ml bag for ADULTS or NS 50 ml bag for PEDS  2 g Intravenous Q24H Whalen, Alona, DO   Stopped at 07/10/25 1309    enoxaparin ANTICOAGULANT (LOVENOX) injection  40 mg  40 mg Subcutaneous Q24H Whalen, Alona, DO   40 mg at 25 1837    entecavir (BARACLUDE) tablet 0.5 mg  0.5 mg Oral Daily Whalen, Alona, DO   0.5 mg at 07/10/25 1045    FLUoxetine (PROzac) capsule 60 mg  60 mg Oral Daily Blake Plasencia MD   60 mg at 07/10/25 1046    fluticasone-vilanterol (BREO ELLIPTA) 100-25 MCG/ACT inhaler 1 puff  1 puff Inhalation Daily Whalen, Alona, DO        ipratropium - albuterol 0.5 mg/2.5 mg (3mg)/3 mL (DUONEB) neb solution 3 mL  3 mL Nebulization Q4H Blake Plasencia MD   3 mL at 07/10/25 1158    isosorbide mononitrate (IMDUR) 24 hr tablet 60 mg  60 mg Oral Daily Whalen, Alona, DO   60 mg at 07/10/25 1049    metoprolol succinate ER (TOPROL-XL) 24 hr half-tab 12.5 mg  12.5 mg Oral Daily Whalen, Alona, DO        multivitamin w/minerals (THERA-VIT-M) tablet 1 tablet  1 tablet Oral Daily Whalen, Alona, DO   1 tablet at 07/10/25 1048    mycophenolate (GENERIC EQUIVALENT) capsule 750 mg  750 mg Oral BID Whalen, Alona, DO   750 mg at 07/10/25 1049    naltrexone (DEPADE/REVIA) tablet 50 mg  50 mg Oral Daily Whalen, Alona, DO   50 mg at 07/10/25 1045    pantoprazole (PROTONIX) EC tablet 40 mg  40 mg Oral Daily Whalen, Alona, DO   40 mg at 07/10/25 1054    predniSONE (DELTASONE) tablet 40 mg  40 mg Oral Daily Blake Plasencia MD   40 mg at 07/10/25 1048    [Held by provider] predniSONE (DELTASONE) tablet 5 mg  5 mg Oral Daily Whalen, Alona, DO        rosuvastatin (CRESTOR) tablet 20 mg  20 mg Oral Daily Whalen, Alona, DO   20 mg at 07/10/25 1055    sodium chloride (PF) 0.9% PF flush 3 mL  3 mL Intracatheter Q8H NE Whalen, Alona, DO   3 mL at 07/10/25 1306     Current Facility-Administered Medications   Medication Dose Route Frequency Provider Last Rate Last Admin       Physical Exam   Temp  Av.3  F (36.8  C)  Min: 97.8  F (36.6  C)  Max: 98.8  F (37.1  C)      Pulse  Av  Min: 63  Max: 80 Resp  Av.9  Min: 18  Max: 44  FiO2 (%)  Av.2 %  Min: 50 %  Max: 65 %  SpO2  Av.2 %  Min: 90 %  Max: 99 %      /72 (BP Location: Right arm, Patient Position: Semi-Botello's)   Pulse 76   Temp 98.8  F (37.1  C) (Oral)   Resp 24   SpO2 96%     Admit       GENERAL APPEARANCE: alert and no distress  HENT: mouth without ulcers or lesions  RESP: lungs clear to auscultation - no rales, rhonchi or wheezes  CV: regular rhythm, normal rate, no rub, no murmur  EDEMA: no LE edema bilaterally  ABDOMEN: soft, nondistended, nontender, bowel sounds normal  MS: extremities normal - no gross deformities noted, no evidence of inflammation in joints, no muscle tenderness  SKIN: no rash  TX KIDNEY: normal  DIALYSIS ACCESS: LUE AV Fistula (No thrill)    Data   All labs reviewed by me.  CMP  Recent Labs   Lab 07/10/25  0936 07/09/25  1038    140   POTASSIUM 3.9 3.4   CHLORIDE 102 104   CO2 19* 22   ANIONGAP 16* 14   * 85   BUN 14.9 12.4   CR 0.68 0.72   GFRESTIMATED >90 >90   HEMA 8.9 9.3   MAG 1.9  --    PHOS 3.2  --    PROTTOTAL  --  6.6   ALBUMIN 3.2* 3.5   BILITOTAL  --  1.0   ALKPHOS  --  82   AST  --  49*   ALT  --  23     CBC  Recent Labs   Lab 07/10/25  0936 07/09/25  1038   HGB 13.1* 14.0   WBC 8.7 12.0*   RBC 4.55 4.91   HCT 41.3 44.0   MCV 91 90   MCH 28.8 28.5   MCHC 31.7 31.8   RDW 16.0* 15.9*    275     INR  Recent Labs   Lab 07/09/25  1038   INR 1.03     ABG  Recent Labs   Lab 07/09/25  1647 07/09/25  1617 07/09/25  1038   PH  --  7.36  --    PCO2  --  39  --    PO2  --  74*  --    HCO3  --  22  --    O2PER 15 4 21      Urine Studies  Recent Labs   Lab Test 07/09/25  1346 08/30/23  1856 08/20/23  1245 08/11/23  1928 06/03/20  1307   COLOR Yellow Yellow Light Yellow Light Yellow Yellow   APPEARANCE Slightly Cloudy* Clear Clear Clear Clear   URINEGLC Negative Negative Negative Negative Negative   URINEBILI Negative Negative Negative Negative Negative   URINEKETONE Trace* 10* 60* 10* Negative   SG 1.015 1.021 1.015 1.009 1.008   UBLD Negative Negative Negative Negative Negative   URINEPH 6.0 5.0 6.0 6.0 6.5    PROTEIN 20* 10* 20* 10* Negative   UROBILINOGEN  --   --   --   --  <2.0 E.U./dL   NITRITE Negative Negative Negative Negative Negative   LEUKEST Negative Negative Negative Negative Negative   RBCU 1 <1 <1 0  --    WBCU 7* 3 1 3  --      Recent Labs   Lab Test 10/28/20  0902 10/01/19  0942 04/02/19  0917 10/23/18  1122 06/21/18  1209   UTPG 0.23* 0.26* 0.23* 0.21* 0.12     PTH  No lab results found.  Iron Studies  Recent Labs   Lab Test 10/02/23  0943   IRON 30*      IRONSAT 10*   LA 50       IMAGING:  All imaging studies reviewed by me.    Past Medical History    I have reviewed this patient's medical history and updated it with pertinent information if needed.   Past Medical History:   Diagnosis Date    Acute midline low back pain without sciatica     AION (acute ischemic optic neuropathy)     Anemia in chronic renal disease     Anxiety     Arthritis 1978    Post transplant    Avascular necrosis of bones of both hips (H)     s/p bilateral hip replacements    Avascular necrosis of bones of both hips (H)     s/p bilateral hip replacements    Basal cell carcinoma     Cataract     Chronic hepatitis B (H)     Chronic osteoarthritis 1978    Post transplant    Clostridium difficile colitis     COPD (chronic obstructive pulmonary disease) (H)     Coronary artery disease involving native coronary artery of native heart without angina pectoris 06/17/2014    Coronary angiogram 6/17/14: Severe distal 3-vessel disease involving small vessels, not amenable to PCI or CABG.    CRP elevated     Depression     Depressive disorder 1978    Post transplant    Dialysis patient     Diverticulosis     Dyslipidemia     Elevated C-reactive protein (CRP)     FSGS (focal segmental glomerulosclerosis)     FSGS (focal segmental glomerulosclerosis)     Gastric ulcer with hemorrhage 02/12/2012    Gastric ulcer with hemorrhage 02/12/2012    GERD (gastroesophageal reflux disease)     Glaucoma     OHTN    Glaucoma     Hemorrhoids      History of blood transfusion     HTN (hypertension)     Hyperlipidemia 1978    Hypertension secondary to other renal disorders     Hypogonadism in male     Immunosuppressed status     Immunosuppression     Kidney replaced by transplant     focal glomerulosclerosis     Kidney transplanted     focal glomerulosclerosis     NSTEMI (non-ST elevated myocardial infarction) (H) 06/17/2014    NSTEMI (non-ST elevated myocardial infarction) (H) 06/17/2014    JULIANE (obstructive sleep apnea)     Doesn't use CPAP    Paracentral scotoma     LE    Renal failure     Secondary renal hyperparathyroidism     Septic bursitis     Squamous cell carcinoma     Uncomplicated asthma 2003    Well controled       Past Surgical History   I have reviewed this patient's surgical history and updated it with pertinent information if needed.  Past Surgical History:   Procedure Laterality Date    APPENDECTOMY      ARTHROPLASTY REVISION HIP Right 06/19/2023    Procedure: RIGHT HIP REVISION;  Surgeon: Juan Mcdonough MD;  Location:  OR    BIOPSY      Cardiac Bypass surgery  06/08/2020    [facility]     CATARACT EXTRACTION EXTRACAPSULAR W/ INTRAOCULAR LENS IMPLANTATION Bilateral 4-20-10, 6-1-10    CATARACT IOL, RT/LT  04/19/2000    RE    CATARACT IOL, RT/LT  06/01/2000    LE    COLECTOMY PARTIAL  01/01/1983     10 cm, diverticulitis     COLECTOMY SUBTOTAL  1983    10 cm, diverticulitis    COLONOSCOPY  02/13/2012    Procedure:COLONOSCOPY; Surgeon:SLOAN GALLARDO; Location: GI    COLONOSCOPY N/A 01/22/2020    Procedure: Colonoscopy, With Polypectomy And Biopsy;  Surgeon: Aston Kiran MD;  Location:  GI    COLONOSCOPY N/A 06/05/2025    Procedure: Colonoscopy;  Surgeon: Lina Claros MD;  Location:  GI    CV CORONARY ANGIOGRAM N/A 06/02/2020    Procedure: Coronary Angiogram;  Surgeon: Alona Florentino MD;  Location: [facility] Cath Lab;  Service: Cardiology    CV CORONARY ANGIOGRAM N/A 09/20/2021    Procedure:  Coronary Angiogram;  Surgeon: Adam Agudelo MD;  Location: [facility] LAB CV    CV LEFT HEART CATHETERIZATION WITHOUT LEFT VENTRICULOGRAM Left 06/02/2020    Procedure: Left Heart Catheterization Without Left Ventriculogram;  Surgeon: Alona Florentino MD;  Location: [facility] Cath Lab;  Service: Cardiology    CV SUPRAVALVULAR AORTOGRAM N/A 09/20/2021    Procedure: Supravalvular Aortagram;  Surgeon: Adam Agudelo MD;  Location: [facility] CV    ESOPHAGOSCOPY, GASTROSCOPY, DUODENOSCOPY (EGD), COMBINED  02/13/2012    Procedure:COMBINED ESOPHAGOSCOPY, GASTROSCOPY, DUODENOSCOPY (EGD); Surgeon:SLOAN GALLARDO; Location: GI    ESOPHAGOSCOPY, GASTROSCOPY, DUODENOSCOPY (EGD), COMBINED  02/13/2012    EXTRACAPSULAR CATARACT EXTRATION WITH INTRAOCULAR LENS IMPLANT  4-20-10, 6-1-10    Rt, Lt    HERNIA REPAIR  1995    Lt inguinal    HERNIA REPAIR  1995    Lt inguinal    HIP SURGERY      1981, bilat MITZI, revised 2001, 2005    HIP SURGERY  01/01/1981    bilat MITZI, revised 2001, 2005    IR FINE NEEDLE ASPIRATION W ULTRASOUND  04/10/2023    JOINT REPLACEMENT      KIDNEY SURGERY  1978 and 1993    transplant    KNEE SURGERY  1983, 1987, 2001    bilat TKA    MOHS MICROGRAPHIC PROCEDURE      MOHS MICROGRAPHIC PROCEDURE      ORTHOPEDIC SURGERY      OTHER SURGICAL HISTORY      kidney [id number], 1993    PHACOEMULSIFICATION CLEAR CORNEA WITH STANDARD INTRAOCULAR LENS IMPLANT Right 04/19/2000    PHACOEMULSIFICATION CLEAR CORNEA WITH STANDARD INTRAOCULAR LENS IMPLANT Left 06/01/2000    NM BOWEL TO BOWEL ANASTOMOSIS      NM CARDIAC SURG PROCEDURE UNLISTED  2020    3x bypass    NM PELVIS/HIP JOINT SURGERY UNLISTED  1981, 1996, 2005, 2023    Both hips, L 1x rev. R 2x rev.    NM STOMACH SURGERY PROCEDURE UNLISTED  1978    Splenectomy    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, auxiliary spleen    TONSILLECTOMY      TRANSPLANT      VASCULAR SURGERY  1976       Family History   I have reviewed this patient's family  history and updated it with pertinent information if needed.   Family History   Problem Relation Age of Onset    Asthma Mother     Arthritis Mother     Cardiovascular Father         AI with valve repair    Hypertension Father     Kidney Disease Father     Other - See Comments Father         AI with valve repair    Hyperlipidemia Father     Heart Disease Father     Anxiety Disorder Maternal Grandmother     Depression Maternal Grandmother     Breast Cancer Maternal Grandmother     Substance Abuse Maternal Grandfather     Cerebrovascular Disease Maternal Grandfather     Other - See Comments Maternal Grandfather         cerebrovascular disease    Depression Maternal Grandfather     Dementia Maternal Grandfather     Heart Disease Maternal Grandfather     Cancer Paternal Grandmother         ovarian ca    Ovarian Cancer Paternal Grandmother     Depression Paternal Grandmother     Uterine Cancer Paternal Grandmother     Cerebrovascular Disease Paternal Grandfather     Dementia Paternal Grandfather     Heart Disease Paternal Grandfather     Kidney Disease Paternal Aunt     Kidney Disease Paternal Aunt     Skin Cancer No family hx of     Glaucoma No family hx of     Macular Degeneration No family hx of     Amblyopia No family hx of     Melanoma No family hx of     Diabetes No family hx of        Social History   I have reviewed this patient's social history and updated it with pertinent information if needed. Jerad M Vandana  reports that he quit smoking about 37 years ago. His smoking use included cigarettes. He started smoking about 45 years ago. He has a 9 pack-year smoking history. He has never been exposed to tobacco smoke. He has quit using smokeless tobacco. He reports that he does not currently use alcohol. He reports that he does not currently use drugs after having used the following drugs: Oxycodone.    Prior to Admission Medications   (Not in a hospital admission)

## 2025-07-10 NOTE — PROVIDER NOTIFICATION
Med maroon 2 messaged    Hey this the nurse for this patient today. I just want to confirm its ok to do oral meds with the patient on BiPAP.

## 2025-07-10 NOTE — PROGRESS NOTES
"ED PT Eval    At time of eval patient with tenuous O2 status. Performed majority of assessment with patient in supine to address patient's recent concern of radicular LBP. Provided bed exercises to initiate core strengthening. Also performed brief bout of sitting EOB and standing with goal of secretion mobilization and cued for breathing strategies.       07/10/25 1200   Appointment Info   Signing Clinician's Name / Credentials (PT) Brown Spangler, PT, DPT   Living Environment   People in Home other (see comments)  (roommate)   Current Living Arrangements house   Home Accessibility stairs to enter home;stairs within home   Number of Stairs, Main Entrance 3   Stair Railings, Main Entrance railings safe and in good condition   Number of Stairs, Within Home, Primary other (see comments)   Living Environment Comments Patient lives in home with roommate with \"a few stairs to enter\" and bedroom/bathroom upstairs.   Self-Care   Usual Activity Tolerance moderate   Current Activity Tolerance poor   Regular Exercise No   Equipment Currently Used at Home walker, rolling   Activity/Exercise/Self-Care Comment Patient IND at baseline but has FWW from previous MSK surgeries. Patient has does not currently drive as he does not have a car and has been ordering groceries. Plans to attend OP PT for sciatica.   General Information   Onset of Illness/Injury or Date of Surgery 07/10/25   Referring Physician Alona Whalen, DO   Patient/Family Therapy Goals Statement (PT) To return home   Pertinent History of Current Problem (include personal factors and/or comorbidities that impact the POC) Per EMR \"65 year old male with PMHx of NSTEMI, CAD s/p CABG (2020), possible COPD, on chronic immunosuppression s/p renal transplant x2 presenting with worsening dyspnea and cough found to have rhinovirus infection and bilateral ground glass opacities c/f possible viral vs atypical pneumonia, admitted for AHRF secondary to undifferentiated pneumonia. \" "   Existing Precautions/Restrictions fall   General Observations activity: up with assist   Cognition   Affect/Mental Status (Cognition) WFL   Range of Motion (ROM)   Range of Motion ROM is WFL   Strength (Manual Muscle Testing)   Strength (Manual Muscle Testing) strength is WFL   Bed Mobility   Bed Mobility sit-supine;supine-sit   Supine-Sit [place] (Bed Mobility) independent   Sit-Supine [place] (Bed Mobility) independent   Transfers   Transfers sit-stand transfer   Sit-Stand Transfer   Sit-Stand [place] (Transfers) modified independence   Assistive Device (Sit-Stand Transfers) walker, front-wheeled   Balance   Balance Comments IND sitting EOB   Clinical Impression   Criteria for Skilled Therapeutic Intervention Yes, treatment indicated   PT Diagnosis (PT) impaired functional mobility   Influenced by the following impairments impaired respiratory status, radicular LBP   Functional limitations due to impairments transfers, gait, stairs   Clinical Presentation (PT Evaluation Complexity) stable   Clinical Presentation Rationale clinical judgement   Clinical Decision Making (Complexity) low complexity   Planned Therapy Interventions (PT) balance training;bed mobility training;gait training;ROM (range of motion);stair training;strengthening;stretching;transfer training   Risk & Benefits of therapy have been explained evaluation/treatment results reviewed;care plan/treatment goals reviewed;risks/benefits reviewed;current/potential barriers reviewed;participants voiced agreement with care plan;participants included;patient   PT Total Evaluation Time   PT Eval, Low Complexity Minutes (45122) 10   Physical Therapy Goals   PT Frequency 5x/week   PT Predicted Duration/Target Date for Goal Attainment 07/24/25   PT Goals Bed Mobility;Transfers;Stairs;Gait   PT: Bed Mobility Modified independent;Supine to/from sit   PT: Transfers Modified independent;Sit to/from stand;Assistive device   PT: Gait Modified  independent;Assistive device;Greater than 200 feet   PT: Stairs Modified independent;Greater than 10 stairs   Interventions   Interventions Quick Adds Therapeutic Activity   PT Discharge Planning   PT Plan progress to gait as O2 allows, LBP management   PT Discharge Recommendation (DC Rec) home with outpatient physical therapy;Transitional Care Facility   PT Rationale for DC Rec At this time patient with limited mobility due to O2 status and would benefit from TCU placement. That being said, anticipate patient will progress quickly and become appropriate for home with OP PT. Will update recs as appropriate.   PT Brief overview of current status mod I with FWW in room   Physical Therapy Time and Intention   Total Session Time (sum of timed and untimed services) 10         Brown Spangler, PT, DPT

## 2025-07-10 NOTE — PROGRESS NOTES
Physician Attestation   I, Blake Plasencia MD, was present with the medical/ALISHA student who participated in the service and in the documentation of the note.  I have verified the history and personally performed the physical exam and medical decision making.  I agree with the assessment and plan of care as documented in the note.      Please see A&P for additional details of medical decision making.    I have personally reviewed the following data over the past 24 hrs:    8.7  \   13.1 (L)   / 252     137 102 14.9 /  103 (H)   3.9 19 (L) 0.68 \     ALT: N/A AST: N/A AP: N/A TBILI: N/A   ALB: 3.2 (L) TOT PROTEIN: N/A LIPASE: N/A     Trop: N/A BNP: N/A     Procal: 0.16 CRP: N/A Lactic Acid: 1.4           Blake Plasencia MD  Date of Service (when I saw the patient): 07/10/25    [facility]    Progress Note - Medicine Service, [facility] TEAM 2       Date of Admission:  7/9/2025    Assessment & Plan   65 year old male with PMHx of NSTEMI, CAD s/p CABG (2020), possible COPD, on chronic immunosuppression s/p renal transplant x2 presenting with worsening dyspnea and cough found to have positive resp PCR panel for Human Rhin/Enterovirus with bilateral ground glass opacities c/f possible viral vs atypical pneumonia, admitted for AHRF secondary to undifferentiated pneumonia.      Changes Today:  -Repeat blood cultures  -increased Duonebs to q4h from QID  -repeat sputum sample  -Ordered aspergillus antigen, procalcitonin, histoplasma ag, blastomyces ab and ag quant, histoplasma ag quant, 1,3 B D glucan,      #AHRF  #C/f viral vs atypical pneumonia  Patient presenting with worsening dyspnea at rest and nonproductive cough for past 3 days and found to be hypoxemic on arrival with significant respiratory distress ultimately needing Bipap for respiratory support. Workup significant for mild Leukocytosis and elevated inflammatory markers with imaging showing ground glass opacities on  CT. RPP positive for rhinovirus/enterovirus. Strep pneumo and legionella urine antigen negative. Suspect clinical presentation secondary viral or atypical pneumonia. Would be atypical for rhino/enterovirus to be causing this significant of a lower respiratory tract infection so will continue with more broad workup. Reassuringly CT PE negative for PE. Given ongoing infectious workup, will continue empiric IV antibiotic course for CAP coverage without atypical coverage given prolonged QTc. Blood cultures came back positive for MRSE, likely contamination, repeat cultures.   Plan:  -Continue Ceftriaxone (7/9- )  -Duonebs q4h time  -Solumedrol 125mg IV 7/9, prednisone 40mg PO daily (7/10 - )  -Sputum cx pending  -Pulm consult  Continue azithromycin and ceftriaxone (azithromycin stopped due to prolonged Qtc)  Duonebs q4h (ordered)  Continue breo (ordered)  Methylpred 125 mg IV x1, prednisone 40 mg (7/10 - )  Does not warrant bronchoscopy at the moment  -Work up thus far:   Respiratory viral panel (positive for rhino/entero)  COVID, Influenza (negative)  Urine histo and blasto ab, ag (negative)  Urine strep and legionella (negative)  Sputum cultures (Pending)  B d glucan and aspergillosis (pending)  Procalcitonin 0.16  MRSA swab (negative)        #?COPD exacerbation   Reviewed PFTs from 4/2018 showed moderate restriction with FVC 63%. Reports a 5 pack year smoking history. Unclear if patient has obstructive lung disease. Diagnosis of COPD appears to come from inaccurate health form filled out by patient years ago. Ideally get updated PFTs and DCLO testing. Interestingly he has had improvement with Bipap, but did not have evidence of CO2 retention on gases suggesting less obstructive pathology.      Plan:  -steroids, antibiotics as above   -Duonebs q4h   -Will continue PTA COPD inhalers for now  -PTA Breo Ellipta 1 puff/day  -PTA albuterol PRN     #Hx of renal transplant   #chronic immunosuppression  Hx of renal failure on  dialysis at age 16, and kidney transplant, infected with hepatitis B from dialysis.      Plan:  Continue PTA Mycophenolate 750mg TID  Continue PTA Prednisone 5mg daily  Transplant Nephrology consult     #Lactic acidosis, resolved    Likely secondary to severe infection in setting of AHRF. LA on admission 3.3 --> 2.5 --> 1.4.      #Elevated troponins  #Type 2 demand ischemia   Likely secondary to demand ischemia in setting of severe infection and respiratory failure. Without chest pain or signs of fluid overload on exam. EKG without acute ischemic changes. POC ECHO without pericardial effusion and normal appearing ejection fraction.     Plan:  -Consider repeat formal ECHO if dyspnea not improving with above measures.     #HFpEF  #CAD s/p CABG  #NSTEMI  NSTEMI in 2014. Most recent ECHO from 6/2020 with LVEF of 54%.     Plan:  Hold lasix 20mg daily while treating infection, may consider resuming on 7/11      #HTN     Plan:  PTA Metoprolol succinate 12.5mg daily  PTA Imdur 60 mg daily  PTA Aspirin 81 mg daily  Holding Nitroglycerin 0.4mg due to patient not taking     #HTN     Plan:  -rosuvastatin 20mg daily     #Chronic hepatitis B     Plan:  -entecavir 0.5mg daily     #GERD     Plan:  -Pantoprazole 40mg daily     #KIM     Plan:  PTA Atarax 10-25mg PRN  PTA Fluoxetine 20mg daily  PTA Fluoxetine 40mg daily     Diet: Combination Diet Low Saturated Fat Na <2400mg Diet, No Caffeine DietRegular diet  DVT Prophylaxis: Enoxaparin (Lovenox) SQ  Blackwood Catheter: Not present  Fluids: none  Lines: None     Cardiac Monitoring: None  Code Status: No CPR- Do NOT IntubateDNR/DNI  Diet: Combination Diet Low Saturated Fat Na <2400mg Diet, No Caffeine Diet    DVT Prophylaxis: Enoxaparin (Lovenox) SQ  Blackwood Catheter: Not present  Fluids: none   Lines: None     Cardiac Monitoring: None  Code Status: No CPR- Do NOT Intubate          Social Drivers of Health   Tobacco Use: Medium Risk (6/30/2025)    Patient History     Smoking Tobacco Use:  Former     Smokeless Tobacco Use: Former     Passive Exposure: Never   Physical Activity: Insufficiently Active (11/5/2024)    Exercise Vital Sign     Days of Exercise per Week: 5 days     Minutes of Exercise per Session: 20 min   Stress: Stress Concern Present (11/5/2024)    [employer] [place] [employer] - Occupational Stress Questionnaire     Feeling of Stress : To some extent   Social Connections: Unknown (11/5/2024)    Social Connection and Isolation Panel [NHANES]     Frequency of Social Gatherings with Friends and Family: More than three times a week         Disposition Plan     Medically Ready for Discharge: Anticipated in 2-4 Days         The patient's care was discussed with the Attending Physician, Dr. Plasencia.    [facility]  Medical Student  Medicine Service, [address]  Securely message with [employer] (more info)  Text page via [employer] Paging/Directory   See signed in provider for up to date coverage information  ______________________________________________________________________    Interval History   Placed on HFNC, then back to bipap overnight    Reports feeling better this morning, less respiratory distress, feels like neb treatments are helping, feeling more rested as well     Denies any chest pain     Physical Exam   Vital Signs: Temp: 98.8  F (37.1  C) Temp src: Oral BP: 133/72 Pulse: 76   Resp: 24 SpO2: 96 % (Simultaneous filing. User may not have seen previous data.) O2 Device: BiPAP/CPAP (Simultaneous filing. User may not have seen previous data.) Oxygen Delivery: 10 LPM  Weight: 0 lbs 0 oz    Physical Exam  Vitals and nursing note reviewed.   Constitutional:       Appearance: Normal appearance. He is not ill-appearing or toxic-appearing.      Comments: Appears less distressed compared to prior exam   HENT:      Head: Normocephalic and atraumatic.      Right Ear: External ear normal.      Left Ear: External ear normal.       Nose: Nose normal.      Comments: Bipap mask in place     Mouth/Throat:      Mouth: Mucous membranes are moist.      Pharynx: Oropharynx is clear.   Eyes:      Extraocular Movements: Extraocular movements intact.      Conjunctiva/sclera: Conjunctivae normal.   Cardiovascular:      Rate and Rhythm: Normal rate and regular rhythm.      Pulses: Normal pulses.      Heart sounds: Normal heart sounds.   Pulmonary:      Effort: Pulmonary effort is normal. No respiratory distress.      Breath sounds: Rhonchi (rhonchi at middle and lower posterior lung fields) present.   Abdominal:      General: Abdomen is flat. Bowel sounds are normal.      Tenderness: There is no abdominal tenderness.   Musculoskeletal:         General: No swelling.   Skin:     General: Skin is warm and dry.      Capillary Refill: Capillary refill takes less than 2 seconds.      Findings: No rash.   Neurological:      General: No focal deficit present.      Mental Status: He is alert. Mental status is at baseline.      Cranial Nerves: No cranial nerve deficit.   Psychiatric:         Mood and Affect: Mood normal.         Behavior: Behavior normal.         Medical Decision Making             Data

## 2025-07-10 NOTE — PROVIDER NOTIFICATION
Jen 2 messaged:    So pt has been off bipap for about a half hour now. Initially I put him on nasal can nula but his sats dropped to mid 80's. I switched him over to oxymask 10L and his sats are in the high 80's now.

## 2025-07-10 NOTE — CONSULTS
[facility]   Pulmonary Consult Note - Initial    Jerad Ross MRN: [id number]  Date of Admission:7/9/2025  Date of Service: 07/09/2025    Reason for consult: 64 yo male on chronic immunosuppression s/p renal transplant. Found to have b/l ground glass opacities on CT c/f ARDS vs atypical infection vs early diffuse aveolar hemorrhage vs organizing pnuemonia. Is bronch with BAL needed?   Primary service: [facility]      ASSESSMENT:     65 year old male with PMHx of NSTEMI, CAD s/p CABG (2020), possible asthma/COPD, on chronic immunosuppression s/p renal transplant x2 presenting with worsening dyspnea and cough found to have rhinovirus infection and bilateral ground glass opacities c/f possible viral vs atypical pneumonia, admitted for AHRF secondary to undifferentiated pneumonia and ACOS exacerbation. Pulmonary consulted for possible bronchoscopy.      #Asthma-COPD overlap, in exacerbation  #Likely viral infection, now superimposed with bacterial pneumonia        RECOMMENDATIONS:      Respiratory viral panel  COVID, Influenza  Urine histo and blasto ab, ag  Urine strep and legionella  Sputum cultures, may induce  B d glucan and aspergillosis  Procalcitonin  MRSA swab  Continue azithromycin and ceftriaxone  Duonebs q4h  Continue breo  Methylpred 125 mg IV today, and prednisone 40 mg starting tomorrow  Does not warrant bronchoscopy at the moment    Pulmonary will follow     Patient seen and discussed with Dr. Lewis.    Gustavo Matos MD   Pulmonary/Critical Care Fellow             HPI:  64 yo with hx of prior CAD s/p CABG, renal transplant on immunosuppression, and reported hx of Asthma/COPD who presented with three days of URI symptoms with one day of worsening dyspnea found to have hypoxemia, elevated inflammatory markers and CT with evidence of new ground glass opacities concerning for infectious process.     Patient seen and examined at the bedside, in mild respiratory distress, doing nebulizer and  unable to speak in full sentences due to cough and shortness of breath. Symptoms associated with chest tightness and fever/chills.     ROS: As per HPI    PMHx/PSHx:  Past Medical History:   Diagnosis Date    Acute midline low back pain without sciatica     AION (acute ischemic optic neuropathy)     Anemia in chronic renal disease     Anxiety     Arthritis 1978    Post transplant    Avascular necrosis of bones of both hips (H)     s/p bilateral hip replacements    Avascular necrosis of bones of both hips (H)     s/p bilateral hip replacements    Basal cell carcinoma     Cataract     Chronic hepatitis B (H)     Chronic osteoarthritis 1978    Post transplant    Clostridium difficile colitis     COPD (chronic obstructive pulmonary disease) (H)     Coronary artery disease involving native coronary artery of native heart without angina pectoris 06/17/2014    Coronary angiogram 6/17/14: Severe distal 3-vessel disease involving small vessels, not amenable to PCI or CABG.    CRP elevated     Depression     Depressive disorder 1978    Post transplant    Dialysis patient     Diverticulosis     Dyslipidemia     Elevated C-reactive protein (CRP)     FSGS (focal segmental glomerulosclerosis)     FSGS (focal segmental glomerulosclerosis)     Gastric ulcer with hemorrhage 02/12/2012    Gastric ulcer with hemorrhage 02/12/2012    GERD (gastroesophageal reflux disease)     Glaucoma     OHTN    Glaucoma     Hemorrhoids     History of blood transfusion     HTN (hypertension)     Hyperlipidemia 1978    Hypertension secondary to other renal disorders     Hypogonadism in male     Immunosuppressed status     Immunosuppression     Kidney replaced by transplant     focal glomerulosclerosis     Kidney transplanted     focal glomerulosclerosis     NSTEMI (non-ST elevated myocardial infarction) (H) 06/17/2014    NSTEMI (non-ST elevated myocardial infarction) (H) 06/17/2014    JULIANE (obstructive sleep apnea)     Doesn't use CPAP    Paracentral  scotoma     LE    Renal failure     Secondary renal hyperparathyroidism     Septic bursitis     Squamous cell carcinoma     Uncomplicated asthma 2003    Well controled     Past Surgical History:   Procedure Laterality Date    APPENDECTOMY      ARTHROPLASTY REVISION HIP Right 06/19/2023    Procedure: RIGHT HIP REVISION;  Surgeon: Juan Mcdonough MD;  Location:  OR    BIOPSY      Cardiac Bypass surgery  06/08/2020    [facility]     CATARACT EXTRACTION EXTRACAPSULAR W/ INTRAOCULAR LENS IMPLANTATION Bilateral 4-20-10, 6-1-10    CATARACT IOL, RT/LT  04/19/2000    RE    CATARACT IOL, RT/LT  06/01/2000    LE    COLECTOMY PARTIAL  01/01/1983     10 cm, diverticulitis     COLECTOMY SUBTOTAL  1983    10 cm, diverticulitis    COLONOSCOPY  02/13/2012    Procedure:COLONOSCOPY; Surgeon:SLOAN GALLARDO; Location: GI    COLONOSCOPY N/A 01/22/2020    Procedure: Colonoscopy, With Polypectomy And Biopsy;  Surgeon: Aston Kiran MD;  Location:  GI    COLONOSCOPY N/A 06/05/2025    Procedure: Colonoscopy;  Surgeon: Lina Claros MD;  Location: [facility]    CV CORONARY ANGIOGRAM N/A 06/02/2020    Procedure: Coronary Angiogram;  Surgeon: Alona Florentino MD;  Location: [facility] Cath Lab;  Service: Cardiology    CV CORONARY ANGIOGRAM N/A 09/20/2021    Procedure: Coronary Angiogram;  Surgeon: Adam Agudelo MD;  Location: [facility] CV    CV LEFT HEART CATHETERIZATION WITHOUT LEFT VENTRICULOGRAM Left 06/02/2020    Procedure: Left Heart Catheterization Without Left Ventriculogram;  Surgeon: Alona Florentino MD;  Location: [facility] Cath Lab;  Service: Cardiology    CV SUPRAVALVULAR AORTOGRAM N/A 09/20/2021    Procedure: Supravalvular Aortagram;  Surgeon: Adam Agudelo MD;  Location: [facility] CV    ESOPHAGOSCOPY, GASTROSCOPY, DUODENOSCOPY (EGD), COMBINED  02/13/2012    Procedure:COMBINED ESOPHAGOSCOPY, GASTROSCOPY, DUODENOSCOPY (EGD); Surgeon:SLOAN GALLARDO;  Location:[facility]    ESOPHAGOSCOPY, GASTROSCOPY, DUODENOSCOPY (EGD), COMBINED  02/13/2012    EXTRACAPSULAR CATARACT EXTRATION WITH INTRAOCULAR LENS IMPLANT  4-20-10, 6-1-10    Rt, Lt    HERNIA REPAIR  1995    Lt inguinal    HERNIA REPAIR  1995    Lt inguinal    HIP SURGERY      1981, bilat MITZI, revised 2001, 2005    HIP SURGERY  01/01/1981    bilat MITZI, revised 2001, 2005    IR FINE NEEDLE ASPIRATION W ULTRASOUND  04/10/2023    JOINT REPLACEMENT      KIDNEY SURGERY  1978 and 1993    transplant    KNEE SURGERY  1983, 1987, 2001    bilat TKA    MOHS MICROGRAPHIC PROCEDURE      MOHS MICROGRAPHIC PROCEDURE      ORTHOPEDIC SURGERY      OTHER SURGICAL HISTORY      kidney [id number], 1993    PHACOEMULSIFICATION CLEAR CORNEA WITH STANDARD INTRAOCULAR LENS IMPLANT Right 04/19/2000    PHACOEMULSIFICATION CLEAR CORNEA WITH STANDARD INTRAOCULAR LENS IMPLANT Left 06/01/2000    IA BOWEL TO BOWEL ANASTOMOSIS      IA CARDIAC SURG PROCEDURE UNLISTED  2020    3x bypass    IA PELVIS/HIP JOINT SURGERY UNLISTED  1981, 1996, 2005, 2023    Both hips, L 1x rev. R 2x rev.    IA STOMACH SURGERY PROCEDURE UNLISTED  1978    Splenectomy    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, auxiliary spleen    TONSILLECTOMY      TRANSPLANT      VASCULAR SURGERY  1976         Outpatient Medications:   Current Facility-Administered Medications   Medication Dose Route Frequency Provider Last Rate Last Admin    acetaminophen (TYLENOL) tablet 650 mg  650 mg Oral Q4H PRN Whalen, Alona, DO        Or    acetaminophen (TYLENOL) Suppository 650 mg  650 mg Rectal Q4H PRN Whalen, Alona, DO        albuterol (PROVENTIL HFA/VENTOLIN HFA) inhaler  2 puff Inhalation Q6H PRN Whalen, Alona, DO        [START ON 7/10/2025] aspirin EC tablet 81 mg  81 mg Oral Daily Whalen, Alona, DO        atropine injection 0.3 mg  0.3 mg Intravenous Once PRN Michael Mas,         benzocaine-menthol (CHLORASEPTIC MAX) 15-10 MG lozenge 1 lozenge  1 lozenge Buccal Q1H PRN Whalen, Alona, DO        calcium  carbonate (TUMS) chewable tablet 1,000 mg  1,000 mg Oral 4x Daily PRN Whalen, Alona, DO        [START ON 7/10/2025] calcium citrate-vitamin D (CITRACAL) 315-6.25 MG-MCG per tablet 1 tablet  1 tablet Oral Daily Whalen, Alona, DO        [START ON 7/10/2025] cefTRIAXone (ROCEPHIN) 2 g vial to attach to  ml bag for ADULTS or NS 50 ml bag for PEDS  2 g Intravenous Q24H Whalen, Alona, DO        enoxaparin ANTICOAGULANT (LOVENOX) injection 40 mg  40 mg Subcutaneous Q24H Whalen, Alona, DO   40 mg at 07/09/25 1837    [START ON 7/10/2025] entecavir (BARACLUDE) tablet 0.5 mg  0.5 mg Oral Daily Whalen, Alona, DO        [START ON 7/10/2025] FLUoxetine (PROzac) capsule 60 mg  60 mg Oral Daily Blake Plasencia MD        fluticasone-vilanterol (BREO ELLIPTA) 100-25 MCG/ACT inhaler 1 puff  1 puff Inhalation Daily Whalen, Alona, DO        hydrOXYzine HCl (ATARAX) tablet 25 mg  25 mg Oral At Bedtime PRN Whalen, Alona, DO        ipratropium - albuterol 0.5 mg/2.5 mg (3mg)/3 mL (DUONEB) neb solution 3 mL  3 mL Nebulization 4x daily Whalen, Alona, DO   3 mL at 07/09/25 1934    [START ON 7/10/2025] isosorbide mononitrate (IMDUR) 24 hr tablet 60 mg  60 mg Oral Daily Whalen, Alona, DO        lidocaine (LMX4) cream   Topical Q1H PRN Whalen, Alona, DO        lidocaine 1 % 0.1-1 mL  0.1-1 mL Other Q1H PRN Whalen, Alona, DO        melatonin tablet 5 mg  5 mg Oral At Bedtime PRN Whalen, Alona, DO        [START ON 7/10/2025] metoprolol succinate ER (TOPROL-XL) 24 hr half-tab 12.5 mg  12.5 mg Oral Daily Whalen, Alona, DO        [START ON 7/10/2025] multivitamin w/minerals (THERA-VIT-M) tablet 1 tablet  1 tablet Oral Daily Whalen, Alona, DO        mycophenolate (GENERIC EQUIVALENT) capsule 750 mg  750 mg Oral BID Whalen, Alona, DO        [START ON 7/10/2025] naltrexone (DEPADE/REVIA) tablet 50 mg  50 mg Oral Daily Whalen, Alona, DO        [START ON 7/10/2025] pantoprazole (PROTONIX) EC tablet 40 mg  40 mg Oral Daily Whalen, Alona, DO        polyethylene glycol (MIRALAX) Packet 17 g  17 g Oral  BID PRN Whalen, Alona, DO        [Held by provider] predniSONE (DELTASONE) tablet 5 mg  5 mg Oral Daily Whalen, Alona, DO        prochlorperazine (COMPAZINE) injection 10 mg  10 mg Intravenous Q6H PRN Whalen, Alona, DO        Or    prochlorperazine (COMPAZINE) tablet 10 mg  10 mg Oral Q6H PRN Whalen, Alona, DO   10 mg at 07/09/25 1607    [START ON 7/10/2025] rosuvastatin (CRESTOR) tablet 20 mg  20 mg Oral Daily Whalen, Alona, DO        senna-docusate (SENOKOT-S/PERICOLACE) 8.6-50 MG per tablet 1 tablet  1 tablet Oral BID PRN Whalen, Alona, DO        Or    senna-docusate (SENOKOT-S/PERICOLACE) 8.6-50 MG per tablet 2 tablet  2 tablet Oral BID PRN Whalen, Alona, DO        sodium chloride (PF) 0.9% PF flush 3 mL  3 mL Intracatheter Q8H NE Whalen, Alona, DO        sodium chloride (PF) 0.9% PF flush 3 mL  3 mL Intracatheter q1 min prn Whalen, Alona, DO         Current Outpatient Medications   Medication Sig Dispense Refill    acetaminophen (TYLENOL) 500 MG tablet Take 500-1,000 mg by mouth every 6 hours as needed for mild pain or fever.      albuterol (PROAIR HFA) 108 (90 Base) MCG/ACT inhaler Inhale 2 puffs into the lungs every 6 hours as needed for shortness of breath / dyspnea or wheezing 1 g 11    aspirin 81 MG EC tablet Take 1 tablet (81 mg) by mouth daily      budesonide (PULMICORT FLEXHALER) 90 MCG/ACT inhaler Inhale 2 puffs into the lungs 2 times daily as needed (shortness of breath)      Calcium Citrate-Vitamin D (CALCIUM CITRATE + D PO) Take 1 tablet by mouth daily.      clindamycin (CLEOCIN) 150 MG capsule TAKE 2 CAPSULES BY MOUTH 1 HOUR PRIOR TO DENTAL APPOINTMENT. TAKE 1 CAPSULE BY MOUTH EVERY 6 HOURS AFTER APPOINTMENT      Dextromethorphan-guaiFENesin (DIABETIC TUSSIN MAX ST)  MG/5ML LIQD Take 10-30 mLs by mouth every 6 hours as needed (cough).      entecavir (BARACLUDE) 0.5 MG tablet Take 1 tablet (0.5 mg) by mouth daily. 90 tablet 3    FLUoxetine (PROZAC) 20 MG capsule Take 1 capsule (20 mg) by mouth daily. With 40mg for a  total daily dose of 60mg 90 capsule 3    FLUoxetine (PROZAC) 40 MG capsule Take 1 capsule (40 mg) by mouth daily. 90 capsule 3    fluticasone-salmeterol (ADVAIR) 250-50 MCG/ACT inhaler Inhale 1 puff into the lungs 2 times daily (Patient taking differently: Inhale 1 puff into the lungs 2 times daily as needed.) 180 each 3    furosemide (LASIX) 20 MG tablet Take 1 tablet (20 mg) by mouth daily as needed (fluid retention) 90 tablet 1    hydrOXYzine HCl (ATARAX) 10 MG tablet Take 1 tablet (10 mg) by mouth daily as needed for anxiety 30 tablet 1    hydrOXYzine HCl (ATARAX) 25 MG tablet Take 1 tablet (25 mg) by mouth nightly as needed for anxiety (sleep) 90 tablet 1    ibuprofen (ADVIL/MOTRIN) 200 MG tablet Take 200 mg by mouth every 4 hours as needed (joint pain).      isosorbide mononitrate (IMDUR) 60 MG 24 hr tablet Take 1 tablet (60 mg) by mouth daily. 90 tablet 2    latanoprost (XALATAN) 0.005 % ophthalmic solution Place 1 drop into both eyes At Bedtime 2.5 mL 11    metoprolol succinate ER (TOPROL XL) 25 MG 24 hr tablet Take 0.5 tablets (12.5 mg) by mouth daily. 45 tablet 2    Multiple Vitamins-Iron (ONE DAILY MULTIVITAMIN/IRON) TABS Take 1 tablet by mouth daily      mycophenolate (GENERIC EQUIVALENT) 250 MG capsule Take 3 capsules (750 mg) by mouth 2 times daily. 540 capsule 0    naltrexone (DEPADE/REVIA) 50 MG tablet Take 1 tablet (50 mg) by mouth daily. 30 tablet 2    nitroGLYcerin (NITROSTAT) 0.4 MG sublingual tablet Place 1 tablet (0.4 mg) under the tongue every 5 minutes as needed for chest pain 20 tablet 3    Omega-3 Fatty Acids (OMEGA 3 PO) Take 1 capsule by mouth daily Dose unknown      pantoprazole (PROTONIX) 40 MG EC tablet TAKE 1 TABLET BY MOUTH EVERY DAY 90 tablet 3    polyethylene glycol (MIRALAX) 17 g packet Take 17 g by mouth daily as needed       predniSONE (DELTASONE) 5 MG tablet Take 1 tablet (5 mg) by mouth daily. 90 tablet 3    rosuvastatin (CRESTOR) 20 MG tablet Take 1 tablet (20 mg) by mouth  daily. 90 tablet 3       Allergies:   Allergies   Allergen Reactions    Penicillins Shortness Of Breath and Hives     Occurred in childhood     Keflex [Cephalexin Hcl] Unknown     Patient could not recall reaction to Keflex  Per chart, has tolerated cefazolin (2023)    Sulfa Antibiotics Rash    Tetracycline Unknown     Patient could not recall reaction, childhood reaction       Family Hx:   Family History   Problem Relation Age of Onset    Asthma Mother     Arthritis Mother     Cardiovascular Father         AI with valve repair    Hypertension Father     Kidney Disease Father     Other - See Comments Father         AI with valve repair    Hyperlipidemia Father     Heart Disease Father     Anxiety Disorder Maternal Grandmother     Depression Maternal Grandmother     Breast Cancer Maternal Grandmother     Substance Abuse Maternal Grandfather     Cerebrovascular Disease Maternal Grandfather     Other - See Comments Maternal Grandfather         cerebrovascular disease    Depression Maternal Grandfather     Dementia Maternal Grandfather     Heart Disease Maternal Grandfather     Cancer Paternal Grandmother         ovarian ca    Ovarian Cancer Paternal Grandmother     Depression Paternal Grandmother     Uterine Cancer Paternal Grandmother     Cerebrovascular Disease Paternal Grandfather     Dementia Paternal Grandfather     Heart Disease Paternal Grandfather     Kidney Disease Paternal Aunt     Kidney Disease Paternal Aunt     Skin Cancer No family hx of     Glaucoma No family hx of     Macular Degeneration No family hx of     Amblyopia No family hx of     Melanoma No family hx of     Diabetes No family hx of          Physical Exam:   /78   Pulse 75   Temp 97.8  F (36.6  C) (Oral)   Resp (!) 32   SpO2 98%   - Gen: Aox3, NAD   - CV: RRR, no m/r/g  - Lung: wheezing all throughout  - Abd: NTND, +BS  - Ext: No BLE swelling  - Neuro: CN grossly intact     Images/Studies:   CT Chest: 1. Mosaic round glass attenuation  with superimposed bronchovascular  groundglass nodules. These findings are nonspecific. Top differential  is ARDS. Differential also includes atypical infection, early diffuse  alveolar hemorrhage, organizing pneumonia or early pulmonary edema.  Recommend pulmonary consultation and if indicated bronchial alveolar  lavage for further differentiation.    Labs:   ABG  Recent Labs   Lab 07/09/25  1617   PH 7.36   PCO2 39   PO2 74*   HCO3 22     CBC  Recent Labs   Lab 07/09/25  1038   WBC 12.0*   HGB 14.0        BMP  Recent Labs   Lab 07/09/25  1038      POTASSIUM 3.4   CHLORIDE 104   CO2 22   BUN 12.4   CR 0.72   GLC 85     LFT  Recent Labs   Lab 07/09/25  1038   AST 49*   ALT 23   ALKPHOS 82   BILITOTAL 1.0   ALBUMIN 3.5   INR 1.03     Coagulation Profile  Recent Labs   Lab 07/09/25  1038   INR 1.03

## 2025-07-11 ENCOUNTER — APPOINTMENT (OUTPATIENT)
Dept: PHYSICAL THERAPY | Facility: CLINIC | Age: 65
End: 2025-07-11
Payer: COMMERCIAL

## 2025-07-11 LAB
1,3 BETA GLUCAN SER-MCNC: <31 PG/ML
ALBUMIN SERPL BCG-MCNC: 3.1 G/DL (ref 3.5–5.2)
ANION GAP SERPL CALCULATED.3IONS-SCNC: 10 MMOL/L (ref 7–15)
BUN SERPL-MCNC: 15 MG/DL (ref 8–23)
CALCIUM SERPL-MCNC: 9.2 MG/DL (ref 8.8–10.4)
CHLORIDE SERPL-SCNC: 103 MMOL/L (ref 98–107)
CREAT SERPL-MCNC: 0.66 MG/DL (ref 0.67–1.17)
EGFRCR SERPLBLD CKD-EPI 2021: >90 ML/MIN/1.73M2
ERYTHROCYTE [DISTWIDTH] IN BLOOD BY AUTOMATED COUNT: 15.9 % (ref 10–15)
GALACTOMANNAN AG SERPL QL IA: NEGATIVE
GALACTOMANNAN AG SPEC IA-ACNC: 0.03
GLUCOSE SERPL-MCNC: 144 MG/DL (ref 70–99)
HCO3 SERPL-SCNC: 24 MMOL/L (ref 22–29)
HCT VFR BLD AUTO: 37.7 % (ref 40–53)
HGB BLD-MCNC: 12.1 G/DL (ref 13.3–17.7)
MAGNESIUM SERPL-MCNC: 2.1 MG/DL (ref 1.7–2.3)
MCH RBC QN AUTO: 28.1 PG (ref 26.5–33)
MCHC RBC AUTO-ENTMCNC: 32.1 G/DL (ref 31.5–36.5)
MCV RBC AUTO: 88 FL (ref 78–100)
OBSERVATION IMP: NEGATIVE
PHOSPHATE SERPL-MCNC: 2.5 MG/DL (ref 2.5–4.5)
PLATELET # BLD AUTO: 252 10E3/UL (ref 150–450)
POTASSIUM SERPL-SCNC: 3.8 MMOL/L (ref 3.4–5.3)
RBC # BLD AUTO: 4.3 10E6/UL (ref 4.4–5.9)
SODIUM SERPL-SCNC: 137 MMOL/L (ref 135–145)
WBC # BLD AUTO: 12.3 10E3/UL (ref 4–11)

## 2025-07-11 PROCEDURE — 36415 COLL VENOUS BLD VENIPUNCTURE: CPT

## 2025-07-11 PROCEDURE — 250N000012 HC RX MED GY IP 250 OP 636 PS 637: Performed by: NURSE PRACTITIONER

## 2025-07-11 PROCEDURE — 99233 SBSQ HOSP IP/OBS HIGH 50: CPT | Mod: FS | Performed by: PHYSICIAN ASSISTANT

## 2025-07-11 PROCEDURE — 99232 SBSQ HOSP IP/OBS MODERATE 35: CPT | Performed by: STUDENT IN AN ORGANIZED HEALTH CARE EDUCATION/TRAINING PROGRAM

## 2025-07-11 PROCEDURE — 94642 AEROSOL INHALATION TREATMENT: CPT

## 2025-07-11 PROCEDURE — 85027 COMPLETE CBC AUTOMATED: CPT

## 2025-07-11 PROCEDURE — 94660 CPAP INITIATION&MGMT: CPT

## 2025-07-11 PROCEDURE — 97530 THERAPEUTIC ACTIVITIES: CPT | Mod: GP

## 2025-07-11 PROCEDURE — 250N000013 HC RX MED GY IP 250 OP 250 PS 637

## 2025-07-11 PROCEDURE — 94640 AIRWAY INHALATION TREATMENT: CPT | Mod: 76

## 2025-07-11 PROCEDURE — 999N000157 HC STATISTIC RCP TIME EA 10 MIN

## 2025-07-11 PROCEDURE — 82040 ASSAY OF SERUM ALBUMIN: CPT

## 2025-07-11 PROCEDURE — 999N000111 HC STATISTIC OT IP EVAL DEFER

## 2025-07-11 PROCEDURE — 250N000013 HC RX MED GY IP 250 OP 250 PS 637: Performed by: STUDENT IN AN ORGANIZED HEALTH CARE EDUCATION/TRAINING PROGRAM

## 2025-07-11 PROCEDURE — 94640 AIRWAY INHALATION TREATMENT: CPT

## 2025-07-11 PROCEDURE — 83735 ASSAY OF MAGNESIUM: CPT

## 2025-07-11 PROCEDURE — 250N000011 HC RX IP 250 OP 636

## 2025-07-11 PROCEDURE — 99233 SBSQ HOSP IP/OBS HIGH 50: CPT | Mod: GC | Performed by: INTERNAL MEDICINE

## 2025-07-11 PROCEDURE — 250N000012 HC RX MED GY IP 250 OP 636 PS 637: Performed by: STUDENT IN AN ORGANIZED HEALTH CARE EDUCATION/TRAINING PROGRAM

## 2025-07-11 PROCEDURE — 120N000011 HC R&B TRANSPLANT UMMC

## 2025-07-11 PROCEDURE — 250N000009 HC RX 250: Performed by: STUDENT IN AN ORGANIZED HEALTH CARE EDUCATION/TRAINING PROGRAM

## 2025-07-11 RX ORDER — DOXYCYCLINE 100 MG/10ML
100 INJECTION, POWDER, LYOPHILIZED, FOR SOLUTION INTRAVENOUS EVERY 12 HOURS
Status: DISCONTINUED | OUTPATIENT
Start: 2025-07-11 | End: 2025-07-11

## 2025-07-11 RX ORDER — DOXYCYCLINE 100 MG/1
100 CAPSULE ORAL EVERY 12 HOURS SCHEDULED
Status: DISCONTINUED | OUTPATIENT
Start: 2025-07-11 | End: 2025-07-12

## 2025-07-11 RX ADMIN — NALTREXONE HYDROCHLORIDE 50 MG: 50 TABLET, FILM COATED ORAL at 08:12

## 2025-07-11 RX ADMIN — ENOXAPARIN SODIUM 40 MG: 40 INJECTION SUBCUTANEOUS at 17:53

## 2025-07-11 RX ADMIN — Medication 1 TABLET: at 08:12

## 2025-07-11 RX ADMIN — IPRATROPIUM BROMIDE AND ALBUTEROL SULFATE 3 ML: .5; 3 SOLUTION RESPIRATORY (INHALATION) at 08:12

## 2025-07-11 RX ADMIN — DOXYCYCLINE HYCLATE 100 MG: 100 CAPSULE ORAL at 21:12

## 2025-07-11 RX ADMIN — PREDNISONE 40 MG: 20 TABLET ORAL at 08:12

## 2025-07-11 RX ADMIN — DOXYCYCLINE 100 MG: 100 INJECTION, POWDER, LYOPHILIZED, FOR SOLUTION INTRAVENOUS at 14:12

## 2025-07-11 RX ADMIN — MYCOPHENOLATE MOFETIL 500 MG: 250 CAPSULE ORAL at 08:12

## 2025-07-11 RX ADMIN — PANTOPRAZOLE SODIUM 40 MG: 40 TABLET, DELAYED RELEASE ORAL at 08:12

## 2025-07-11 RX ADMIN — FLUOXETINE HYDROCHLORIDE 60 MG: 40 CAPSULE ORAL at 08:11

## 2025-07-11 RX ADMIN — ISOSORBIDE MONONITRATE 60 MG: 60 TABLET, EXTENDED RELEASE ORAL at 08:12

## 2025-07-11 RX ADMIN — PSYLLIUM HUSK 1 PACKET: 3.4 POWDER ORAL at 13:00

## 2025-07-11 RX ADMIN — ASPIRIN 81 MG CHEWABLE TABLET 81 MG: 81 TABLET CHEWABLE at 08:11

## 2025-07-11 RX ADMIN — IPRATROPIUM BROMIDE AND ALBUTEROL SULFATE 3 ML: .5; 3 SOLUTION RESPIRATORY (INHALATION) at 12:22

## 2025-07-11 RX ADMIN — MYCOPHENOLATE MOFETIL 500 MG: 250 CAPSULE ORAL at 17:53

## 2025-07-11 RX ADMIN — IPRATROPIUM BROMIDE AND ALBUTEROL SULFATE 3 ML: .5; 3 SOLUTION RESPIRATORY (INHALATION) at 01:06

## 2025-07-11 RX ADMIN — IPRATROPIUM BROMIDE AND ALBUTEROL SULFATE 3 ML: .5; 3 SOLUTION RESPIRATORY (INHALATION) at 04:12

## 2025-07-11 RX ADMIN — Medication 12.5 MG: at 18:22

## 2025-07-11 RX ADMIN — CEFTRIAXONE SODIUM 2 G: 2 INJECTION, POWDER, FOR SOLUTION INTRAMUSCULAR; INTRAVENOUS at 13:00

## 2025-07-11 RX ADMIN — IPRATROPIUM BROMIDE AND ALBUTEROL SULFATE 3 ML: .5; 3 SOLUTION RESPIRATORY (INHALATION) at 19:57

## 2025-07-11 RX ADMIN — ENTECAVIR 0.5 MG: 0.5 TABLET ORAL at 08:12

## 2025-07-11 RX ADMIN — ROSUVASTATIN CALCIUM 20 MG: 20 TABLET, FILM COATED ORAL at 08:11

## 2025-07-11 ASSESSMENT — ACTIVITIES OF DAILY LIVING (ADL)
ADLS_ACUITY_SCORE: 44
ADLS_ACUITY_SCORE: 61
ADLS_ACUITY_SCORE: 44
TOILETING_ISSUES: NO
ADLS_ACUITY_SCORE: 61
EQUIPMENT_CURRENTLY_USED_AT_HOME: WALKER, ROLLING
ADLS_ACUITY_SCORE: 61
WEAR_GLASSES_OR_BLIND: YES
FALL_HISTORY_WITHIN_LAST_SIX_MONTHS: NO
CONCENTRATING,_REMEMBERING_OR_MAKING_DECISIONS_DIFFICULTY: NO
ADLS_ACUITY_SCORE: 61
HEARING_DIFFICULTY_OR_DEAF: NO
ADLS_ACUITY_SCORE: 61
ADLS_ACUITY_SCORE: 61
VISION_MANAGEMENT: GLASSES
ADLS_ACUITY_SCORE: 61
DIFFICULTY_COMMUNICATING: NO
ADLS_ACUITY_SCORE: 61
CHANGE_IN_FUNCTIONAL_STATUS_SINCE_ONSET_OF_CURRENT_ILLNESS/INJURY: NO
DRESSING/BATHING_DIFFICULTY: NO
DOING_ERRANDS_INDEPENDENTLY_DIFFICULTY: NO
ADLS_ACUITY_SCORE: 61

## 2025-07-11 NOTE — PLAN OF CARE
Admitted/transferred from:   2 RN full   skin assessment completed by Maddi Montes RN and Alan BOO RN.  Skin assessment finding: issues found blister on second toe of L foot. Generalized melanoma spots.   Interventions/actions: skin interventions meplex on bridge of nose for BIPAP     Will continue to monitor.

## 2025-07-11 NOTE — PLAN OF CARE
/86 (BP Location: Right arm)   Pulse 78   Temp 98.3  F (36.8  C) (Oral)   Resp 18   SpO2 98%     Patient is alert and oriented x 4. VSS on high flow nasal cannula. SBA. Tolerating diet. Continent B/B. R PIV SL. Denies pain and nausea. Report given to 7A RN, Alan CORTEZ    Goal Outcome Evaluation:      Plan of Care Reviewed With: patient    Overall Patient Progress: improvingOverall Patient Progress: improving

## 2025-07-11 NOTE — PLAN OF CARE
Shift: [id number]  Neuro: A&Ox4   Cardiac: VSS.   Respiratory: Satting 100% on Bipap  GI/: Adequate urine output; urinal bedside  Diet/appetite: Tolerating regular diet   Activity: Assist of 1  Pain: Denies pain, dizziness, N/V, fever; c/o of SOB and cough   Skin: No new deficits noted.  LDA's: R PIV saline locked     Plan: Continue with POC. Pt uses oxymask to eat and feels relief with scheduled nebs. IV abx for possible lung infection. Notify primary team with changes.     BP (!) 140/73   Pulse 65   Temp 98  F (36.7  C) (Axillary)   Resp 24   SpO2 99%       Goal Outcome Evaluation:    Plan of Care Reviewed With: patient    Overall Patient Progress: improving

## 2025-07-11 NOTE — PLAN OF CARE
Occupational Therapy: Orders received. Chart reviewed and discussed with care team.? Occupational Therapy not indicated due to conversation with PT, pt is up ind, limited mostly by O2 needs. PT will follow pt for general strength/endurance while he is admitted. No acute IP OT needs at this time.? Defer discharge recommendations to Physical Therapy and Medical Team.? Will complete orders.

## 2025-07-11 NOTE — PROGRESS NOTES
[facility]   Pulmonary Consult Note - Initial    Jerad Ross MRN: [id number]  Date of Admission:7/9/2025  Date of Service: 07/11/2025    Reason for consult: 64 yo male on chronic immunosuppression s/p renal transplant. Found to have b/l ground glass opacities on CT c/f ARDS vs atypical infection vs early diffuse aveolar hemorrhage vs organizing pnuemonia. Is bronch with BAL needed?   Primary service: [facility]      ASSESSMENT:     65 year old male with PMHx of NSTEMI, CAD s/p CABG (2020), possible asthma/COPD, on chronic immunosuppression s/p renal transplant x2 presenting with worsening dyspnea and cough found to have rhinovirus infection and bilateral ground glass opacities c/f possible viral vs atypical pneumonia, admitted for AHRF secondary to undifferentiated pneumonia and ACOS exacerbation. Pulmonary consulted for possible bronchoscopy.      #Asthma-COPD overlap, in exacerbation  #Likely viral infection, now superimposed with bacterial pneumonia. Rhinovirus positive        RECOMMENDATIONS:      Urine histo and blasto ab, ag  Urine strep and legionella  Sputum cultures, may induce  Continue doxycycline and ceftriaxone for a total of 5 day course  Duonebs q4h  Continue breo  Continue prednisone 40 mg for a total of five day course  IS and flutter valve  Does not warrant bronchoscopy at the moment    Pulmonary will follow     Patient seen and discussed with Dr. Lewis.    Gustavo Matos MD   Pulmonary/Critical Care Fellow             HPI/interval: Patient seen and examined, states he is feeling better, afebrile, laying comfortable in bed. Currently on HFNC with O2 sat ~92%. Symptoms much improved.    ROS: As per HPI    PMHx/PSHx:  Past Medical History:   Diagnosis Date    Acute midline low back pain without sciatica     AION (acute ischemic optic neuropathy)     Anemia in chronic renal disease     Anxiety     Arthritis 1978    Post transplant    Avascular necrosis of bones of both hips (H)      s/p bilateral hip replacements    Avascular necrosis of bones of both hips (H)     s/p bilateral hip replacements    Basal cell carcinoma     Cataract     Chronic hepatitis B (H)     Chronic osteoarthritis 1978    Post transplant    Clostridium difficile colitis     COPD (chronic obstructive pulmonary disease) (H)     Coronary artery disease involving native coronary artery of native heart without angina pectoris 06/17/2014    Coronary angiogram 6/17/14: Severe distal 3-vessel disease involving small vessels, not amenable to PCI or CABG.    CRP elevated     Depression     Depressive disorder 1978    Post transplant    Dialysis patient     Diverticulosis     Dyslipidemia     Elevated C-reactive protein (CRP)     FSGS (focal segmental glomerulosclerosis)     FSGS (focal segmental glomerulosclerosis)     Gastric ulcer with hemorrhage 02/12/2012    Gastric ulcer with hemorrhage 02/12/2012    GERD (gastroesophageal reflux disease)     Glaucoma     OHTN    Glaucoma     Hemorrhoids     History of blood transfusion     HTN (hypertension)     Hyperlipidemia 1978    Hypertension secondary to other renal disorders     Hypogonadism in male     Immunosuppressed status     Immunosuppression     Kidney replaced by transplant     focal glomerulosclerosis     Kidney transplanted     focal glomerulosclerosis     NSTEMI (non-ST elevated myocardial infarction) (H) 06/17/2014    NSTEMI (non-ST elevated myocardial infarction) (H) 06/17/2014    JULIANE (obstructive sleep apnea)     Doesn't use CPAP    Paracentral scotoma     LE    Renal failure     Secondary renal hyperparathyroidism     Septic bursitis     Squamous cell carcinoma     Uncomplicated asthma 2003    Well controled     Past Surgical History:   Procedure Laterality Date    APPENDECTOMY      ARTHROPLASTY REVISION HIP Right 06/19/2023    Procedure: RIGHT HIP REVISION;  Surgeon: Juan Mcdonough MD;  Location: [facility] OR    BIOPSY      Cardiac Bypass surgery  06/08/2020    [facility]      CATARACT EXTRACTION EXTRACAPSULAR W/ INTRAOCULAR LENS IMPLANTATION Bilateral 4-20-10, 6-1-10    CATARACT IOL, RT/LT  04/19/2000    RE    CATARACT IOL, RT/LT  06/01/2000    LE    COLECTOMY PARTIAL  01/01/1983     10 cm, diverticulitis     COLECTOMY SUBTOTAL  1983    10 cm, diverticulitis    COLONOSCOPY  02/13/2012    Procedure:COLONOSCOPY; Surgeon:SLOAN GALLARDO; Location:[facility]    COLONOSCOPY N/A 01/22/2020    Procedure: Colonoscopy, With Polypectomy And Biopsy;  Surgeon: Aston Kiran MD;  Location: [facility]    COLONOSCOPY N/A 06/05/2025    Procedure: Colonoscopy;  Surgeon: Lina Claros MD;  [facility]    CV CORONARY ANGIOGRAM N/A 06/02/2020    Procedure: Coronary Angiogram;  Surgeon: Alona Florentino MD;  Location: [facility];  Service: Cardiology    CV CORONARY ANGIOGRAM N/A 09/20/2021    Procedure: Coronary Angiogram;  Surgeon: Adam Agudelo MD;  Location: [facility] CV    CV LEFT HEART CATHETERIZATION WITHOUT LEFT VENTRICULOGRAM Left 06/02/2020    Procedure: Left Heart Catheterization Without Left Ventriculogram;  Surgeon: Alona Florentino MD;  Location: [facility];  Service: Cardiology    CV SUPRAVALVULAR AORTOGRAM N/A 09/20/2021    Procedure: Supravalvular Aortagram;  Surgeon: Adam Agudelo MD;  Location: [facility] CV    ESOPHAGOSCOPY, GASTROSCOPY, DUODENOSCOPY (EGD), COMBINED  02/13/2012    Procedure:COMBINED ESOPHAGOSCOPY, GASTROSCOPY, DUODENOSCOPY (EGD); Surgeon:SLOAN GALLARDO; Location: GI    ESOPHAGOSCOPY, GASTROSCOPY, DUODENOSCOPY (EGD), COMBINED  02/13/2012    EXTRACAPSULAR CATARACT EXTRATION WITH INTRAOCULAR LENS IMPLANT  4-20-10, 6-1-10    Rt, Lt    HERNIA REPAIR  1995    Lt inguinal    HERNIA REPAIR  1995    Lt inguinal    HIP SURGERY      1981, bilat MITZI, revised 2001, 2005    HIP SURGERY  01/01/1981    bilat MITZI, revised 2001, 2005    IR FINE NEEDLE ASPIRATION W ULTRASOUND  04/10/2023    JOINT  REPLACEMENT      KIDNEY SURGERY  1978 and 1993    transplant    KNEE SURGERY  1983, 1987, 2001    bilat TKA    MOHS MICROGRAPHIC PROCEDURE      MOHS MICROGRAPHIC PROCEDURE      ORTHOPEDIC SURGERY      OTHER SURGICAL HISTORY      kidney [id number], 1993    PHACOEMULSIFICATION CLEAR CORNEA WITH STANDARD INTRAOCULAR LENS IMPLANT Right 04/19/2000    PHACOEMULSIFICATION CLEAR CORNEA WITH STANDARD INTRAOCULAR LENS IMPLANT Left 06/01/2000    WI BOWEL TO BOWEL ANASTOMOSIS      WI CARDIAC SURG PROCEDURE UNLISTED  2020    3x bypass    WI PELVIS/HIP JOINT SURGERY UNLISTED  1981, 1996, 2005, 2023    Both hips, L 1x rev. R 2x rev.    WI STOMACH SURGERY PROCEDURE UNLISTED  1978    Splenectomy    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, auxiliary spleen    TONSILLECTOMY      TRANSPLANT      VASCULAR SURGERY  1976         Outpatient Medications:   Current Facility-Administered Medications   Medication Dose Route Frequency Provider Last Rate Last Admin    acetaminophen (TYLENOL) tablet 650 mg  650 mg Oral Q4H PRN Whalen, Alona, DO        Or    acetaminophen (TYLENOL) Suppository 650 mg  650 mg Rectal Q4H PRN Whalen, Alona, DO        albuterol (PROVENTIL HFA/VENTOLIN HFA) inhaler  2 puff Inhalation Q6H PRN Whalen, Alona, DO        aspirin EC tablet 81 mg  81 mg Oral Daily Whalen, Alona, DO   81 mg at 07/11/25 0811    atropine injection 0.3 mg  0.3 mg Intravenous Once PRN Michael Mas,         benzocaine-menthol (CHLORASEPTIC MAX) 15-10 MG lozenge 1 lozenge  1 lozenge Buccal Q1H PRN Whalen, Alona, DO        calcium carbonate (TUMS) chewable tablet 1,000 mg  1,000 mg Oral 4x Daily PRN Whalen, Alona, DO        calcium citrate-vitamin D (CITRACAL) 315-6.25 MG-MCG per tablet 1 tablet  1 tablet Oral Daily Whalen, Alona, DO   1 tablet at 07/11/25 0812    cefTRIAXone (ROCEPHIN) 2 g vial to attach to  ml bag for ADULTS or NS 50 ml bag for PEDS  2 g Intravenous Q24H Whalen, Alona, DO   Stopped at 07/11/25 1509    doxycycline hyclate (VIBRAMYCIN) capsule 100 mg   100 mg Oral Q12H [facility] (08/20) Blake Plasencia MD        enoxaparin ANTICOAGULANT (LOVENOX) injection 40 mg  40 mg Subcutaneous Q24H Whalen, Alona, DO   40 mg at 07/10/25 1739    entecavir (BARACLUDE) tablet 0.5 mg  0.5 mg Oral Daily Whalen, Alona, DO   0.5 mg at 07/11/25 0812    FLUoxetine (PROzac) capsule 60 mg  60 mg Oral Daily Blake Plasencia MD   60 mg at 07/11/25 0811    fluticasone-vilanterol (BREO ELLIPTA) 100-25 MCG/ACT inhaler 1 puff  1 puff Inhalation Daily Whalen, Alona, DO        hydrOXYzine HCl (ATARAX) tablet 25 mg  25 mg Oral At Bedtime PRN Whalen, Alona, DO        ipratropium - albuterol 0.5 mg/2.5 mg (3mg)/3 mL (DUONEB) neb solution 3 mL  3 mL Nebulization Q4H Blake Plasencia MD   3 mL at 07/11/25 1222    isosorbide mononitrate (IMDUR) 24 hr tablet 60 mg  60 mg Oral Daily Whalen, Alona, DO   60 mg at 07/11/25 0812    lidocaine (LMX4) cream   Topical Q1H PRN Whalen, Alona, DO        lidocaine 1 % 0.1-1 mL  0.1-1 mL Other Q1H PRN Whlaen, Alona, DO        melatonin tablet 5 mg  5 mg Oral At Bedtime PRN Whalen, Alona, DO        metoprolol succinate ER (TOPROL-XL) 24 hr half-tab 12.5 mg  12.5 mg Oral Daily Whalen, Alona, DO        multivitamin w/minerals (THERA-VIT-M) tablet 1 tablet  1 tablet Oral Daily Whalen, Alona, DO   1 tablet at 07/11/25 0812    mycophenolate (GENERIC EQUIVALENT) capsule 500 mg  500 mg Oral BID IS Lisa Levine APRN CNP   500 mg at 07/11/25 0812    naltrexone (DEPADE/REVIA) tablet 50 mg  50 mg Oral Daily Whalen, Alona, DO   50 mg at 07/11/25 0812    pantoprazole (PROTONIX) EC tablet 40 mg  40 mg Oral Daily Whalen, Alona, DO   40 mg at 07/11/25 0812    polyethylene glycol (MIRALAX) Packet 17 g  17 g Oral BID PRN Wahlen, Alona, DO        predniSONE (DELTASONE) tablet 40 mg  40 mg Oral Daily Blake Plasencia MD   40 mg at 07/11/25 0812    [Held by provider] predniSONE (DELTASONE) tablet 5 mg  5 mg Oral Daily Whalen, Alona, DO        prochlorperazine (COMPAZINE) injection 10 mg  10 mg Intravenous Q6H PRN WhalenVaibhavin, DO         Or    prochlorperazine (COMPAZINE) tablet 10 mg  10 mg Oral Q6H PRN Whalen, Alona, DO   10 mg at 07/09/25 1607    psyllium (METAMUCIL/KONSYL) Packet 1 packet  1 packet Oral Daily Blake Plasencia MD   1 packet at 07/11/25 1300    rosuvastatin (CRESTOR) tablet 20 mg  20 mg Oral Daily Whalen, Alona, DO   20 mg at 07/11/25 0811    senna-docusate (SENOKOT-S/PERICOLACE) 8.6-50 MG per tablet 1 tablet  1 tablet Oral BID PRN Whalen, Alona, DO        Or    senna-docusate (SENOKOT-S/PERICOLACE) 8.6-50 MG per tablet 2 tablet  2 tablet Oral BID PRN Whalen, Alona, DO        sodium chloride (PF) 0.9% PF flush 3 mL  3 mL Intracatheter Q8H NE Whalen, Alona, DO   3 mL at 07/11/25 1301    sodium chloride (PF) 0.9% PF flush 3 mL  3 mL Intracatheter q1 min prn Whalen, Alona, DO         Current Outpatient Medications   Medication Sig Dispense Refill    acetaminophen (TYLENOL) 500 MG tablet Take 500-1,000 mg by mouth every 6 hours as needed for mild pain or fever.      albuterol (PROAIR HFA) 108 (90 Base) MCG/ACT inhaler Inhale 2 puffs into the lungs every 6 hours as needed for shortness of breath / dyspnea or wheezing 1 g 11    aspirin 81 MG EC tablet Take 1 tablet (81 mg) by mouth daily      budesonide (PULMICORT FLEXHALER) 90 MCG/ACT inhaler Inhale 2 puffs into the lungs 2 times daily as needed (shortness of breath)      Calcium Citrate-Vitamin D (CALCIUM CITRATE + D PO) Take 1 tablet by mouth daily.      clindamycin (CLEOCIN) 150 MG capsule TAKE 2 CAPSULES BY MOUTH 1 HOUR PRIOR TO DENTAL APPOINTMENT. TAKE 1 CAPSULE BY MOUTH EVERY 6 HOURS AFTER APPOINTMENT      Dextromethorphan-guaiFENesin (DIABETIC TUSSIN MAX ST)  MG/5ML LIQD Take 10-30 mLs by mouth every 6 hours as needed (cough).      entecavir (BARACLUDE) 0.5 MG tablet Take 1 tablet (0.5 mg) by mouth daily. 90 tablet 3    FLUoxetine (PROZAC) 20 MG capsule Take 1 capsule (20 mg) by mouth daily. With 40mg for a total daily dose of 60mg 90 capsule 3    FLUoxetine (PROZAC) 40 MG capsule  Take 1 capsule (40 mg) by mouth daily. 90 capsule 3    fluticasone-salmeterol (ADVAIR) 250-50 MCG/ACT inhaler Inhale 1 puff into the lungs 2 times daily (Patient taking differently: Inhale 1 puff into the lungs 2 times daily as needed.) 180 each 3    furosemide (LASIX) 20 MG tablet Take 1 tablet (20 mg) by mouth daily as needed (fluid retention) 90 tablet 1    hydrOXYzine HCl (ATARAX) 10 MG tablet Take 1 tablet (10 mg) by mouth daily as needed for anxiety 30 tablet 1    hydrOXYzine HCl (ATARAX) 25 MG tablet Take 1 tablet (25 mg) by mouth nightly as needed for anxiety (sleep) 90 tablet 1    ibuprofen (ADVIL/MOTRIN) 200 MG tablet Take 200 mg by mouth every 4 hours as needed (joint pain).      isosorbide mononitrate (IMDUR) 60 MG 24 hr tablet Take 1 tablet (60 mg) by mouth daily. 90 tablet 2    latanoprost (XALATAN) 0.005 % ophthalmic solution Place 1 drop into both eyes At Bedtime 2.5 mL 11    metoprolol succinate ER (TOPROL XL) 25 MG 24 hr tablet Take 0.5 tablets (12.5 mg) by mouth daily. 45 tablet 2    Multiple Vitamins-Iron (ONE DAILY MULTIVITAMIN/IRON) TABS Take 1 tablet by mouth daily      mycophenolate (GENERIC EQUIVALENT) 250 MG capsule Take 3 capsules (750 mg) by mouth 2 times daily. 540 capsule 0    naltrexone (DEPADE/REVIA) 50 MG tablet Take 1 tablet (50 mg) by mouth daily. 30 tablet 2    nitroGLYcerin (NITROSTAT) 0.4 MG sublingual tablet Place 1 tablet (0.4 mg) under the tongue every 5 minutes as needed for chest pain 20 tablet 3    Omega-3 Fatty Acids (OMEGA 3 PO) Take 1 capsule by mouth daily Dose unknown      pantoprazole (PROTONIX) 40 MG EC tablet TAKE 1 TABLET BY MOUTH EVERY DAY 90 tablet 3    polyethylene glycol (MIRALAX) 17 g packet Take 17 g by mouth daily as needed       predniSONE (DELTASONE) 5 MG tablet Take 1 tablet (5 mg) by mouth daily. 90 tablet 3    rosuvastatin (CRESTOR) 20 MG tablet Take 1 tablet (20 mg) by mouth daily. 90 tablet 3       Allergies:   Allergies   Allergen Reactions     Penicillins Shortness Of Breath and Hives     Occurred in childhood     Keflex [Cephalexin Hcl] Unknown     Patient could not recall reaction to Keflex  Per chart, has tolerated cefazolin (2023)    Sulfa Antibiotics Rash    Tetracycline Unknown     Patient could not recall reaction, childhood reaction       Family Hx:   Family History   Problem Relation Age of Onset    Asthma Mother     Arthritis Mother     Cardiovascular Father         AI with valve repair    Hypertension Father     Kidney Disease Father     Other - See Comments Father         AI with valve repair    Hyperlipidemia Father     Heart Disease Father     Anxiety Disorder Maternal Grandmother     Depression Maternal Grandmother     Breast Cancer Maternal Grandmother     Substance Abuse Maternal Grandfather     Cerebrovascular Disease Maternal Grandfather     Other - See Comments Maternal Grandfather         cerebrovascular disease    Depression Maternal Grandfather     Dementia Maternal Grandfather     Heart Disease Maternal Grandfather     Cancer Paternal Grandmother         ovarian ca    Ovarian Cancer Paternal Grandmother     Depression Paternal Grandmother     Uterine Cancer Paternal Grandmother     Cerebrovascular Disease Paternal Grandfather     Dementia Paternal Grandfather     Heart Disease Paternal Grandfather     Kidney Disease Paternal Aunt     Kidney Disease Paternal Aunt     Skin Cancer No family hx of     Glaucoma No family hx of     Macular Degeneration No family hx of     Amblyopia No family hx of     Melanoma No family hx of     Diabetes No family hx of          Physical Exam:   /86 (BP Location: Right arm)   Pulse 78   Temp 98.3  F (36.8  C) (Oral)   Resp 28   SpO2 94%   - Gen: Aox3, NAD   - CV: RRR, no m/r/g  - Lung: wheezing all throughout  - Abd: NTND, +BS  - Ext: No BLE swelling  - Neuro: CN grossly intact     Images/Studies:   CT Chest: 1. Mosaic round glass attenuation with superimposed bronchovascular  groundglass  nodules. These findings are nonspecific. Top differential  is ARDS. Differential also includes atypical infection, early diffuse  alveolar hemorrhage, organizing pneumonia or early pulmonary edema.  Recommend pulmonary consultation and if indicated bronchial alveolar  lavage for further differentiation.    Labs:   ABG  Recent Labs   Lab 07/09/25  1617   PH 7.36   PCO2 39   PO2 74*   HCO3 22     CBC  Recent Labs   Lab 07/11/25  0716 07/10/25  0936 07/09/25  1038   WBC 12.3* 8.7 12.0*   HGB 12.1* 13.1* 14.0    252 275     BMP  Recent Labs   Lab 07/11/25  0716 07/10/25  0936 07/09/25  1038    137 140   POTASSIUM 3.8 3.9 3.4   CHLORIDE 103 102 104   CO2 24 19* 22   BUN 15.0 14.9 12.4   CR 0.66* 0.68 0.72   * 103* 85     LFT  Recent Labs   Lab 07/11/25 0716 07/10/25  0936 07/09/25  1038   AST  --   --  49*   ALT  --   --  23   ALKPHOS  --   --  82   BILITOTAL  --   --  1.0   ALBUMIN 3.1* 3.2* 3.5   INR  --   --  1.03     Coagulation Profile  Recent Labs   Lab 07/09/25  1038   INR 1.03

## 2025-07-11 NOTE — PROGRESS NOTES
Physician Attestation   I, Blake Plasencia MD, was present with the medical/ALISHA student who participated in the service and in the documentation of the note.  I have verified the history and personally performed the physical exam and medical decision making.  I agree with the assessment and plan of care as documented in the note.      Please see A&P for additional details of medical decision making.    I have personally reviewed the following data over the past 24 hrs:    12.3 (H)  \   12.1 (L)   / 252     137 103 15.0 /  144 (H)   3.8 24 0.66 (L) \     ALT: N/A AST: N/A AP: N/A TBILI: N/A   ALB: 3.1 (L) TOT PROTEIN: N/A LIPASE: N/A         Blake Plasencia MD  Date of Service (when I saw the patient): 07/11/25      [facility]    Progress Note - Medicine Service, [facility] TEAM 2       Date of Admission:  7/9/2025    Assessment & Plan   65 year old male with PMHx of NSTEMI, CAD s/p CABG (2020), possible COPD, on chronic immunosuppression s/p renal transplant x2 presenting with worsening dyspnea and cough found to have positive resp PCR panel for Human Rhin/Enterovirus with bilateral ground glass opacities c/f possible viral vs atypical pneumonia, admitted for AHRF secondary to undifferentiated pneumonia.      Changes Today:  - Start doxycycline (per pulm)  - Wean oxygen   - PT consult   - Reduce PTA Mycophenolate 500mg BID     #AHRF  #C/f viral vs atypical pneumonia  Patient presenting with worsening dyspnea at rest and nonproductive cough for past 3 days and found to be hypoxemic on arrival with significant respiratory distress ultimately needing Bipap for respiratory support. Workup significant for mild Leukocytosis and elevated inflammatory markers with imaging showing ground glass opacities on CT. RPP positive for rhinovirus/enterovirus. Strep pneumo and legionella urine antigen negative. Suspect clinical presentation secondary viral or atypical pneumonia. Would be  atypical for rhino/enterovirus to be causing this significant of a lower respiratory tract infection so will continue with more broad workup. Reassuringly CT PE negative for PE. Given ongoing infectious workup, will continue empiric IV antibiotic course for CAP coverage and atypical causes with doxycycline. Blood cultures came back positive for MRSE, likely contamination, repeat cultures. Start oxygen weaning trials to determine if his respiratory status is improving.  Plan:  -Continue Ceftriaxone (7/9- )  -Duonebs q4h time  -Solumedrol 125mg IV 7/9, prednisone 40mg PO daily (7/10 - )  -Sputum cx pending  -Pulm consult  Continue doxycycline and ceftriaxone (azithromycin stopped due to prolonged Qtc)  Duonebs q4h (ordered)  Continue breo (ordered)  Methylpred 125 mg IV x1, prednisone 40 mg (7/10 - )  Does not warrant bronchoscopy at the moment  -Work up thus far:   Respiratory viral panel (positive for rhino/entero)  COVID, Influenza (negative)  Urine histo and blasto ab, ag (negative)  Urine strep and legionella (negative)  Sputum cultures (Pending)  B d glucan and aspergillosis (pending)  Procalcitonin 0.16  MRSA swab (negative)     #COPD exacerbation   Reviewed PFTs from 4/2018 showed moderate restriction with FVC 63%. Reports a 5 pack year smoking history. Unclear if patient has obstructive lung disease. Diagnosis of COPD appears to come from inaccurate health form filled out by patient years ago. Ideally get updated PFTs and DCLO testing. Interestingly he has had improvement with Bipap, but did not have evidence of CO2 retention on gases suggesting less obstructive pathology.      Plan:  -steroids, antibiotics as above   -Duonebs q4h   -Will continue PTA COPD inhalers for now  -PTA Breo Ellipta 1 puff/day  -PTA albuterol PRN     #Hx of renal transplant   #chronic immunosuppression  Hx of renal failure on dialysis at age 16, and kidney transplant, infected with hepatitis B from dialysis.      Plan:  Reduce PTA  Mycophenolate 500mg BID (decreased dose for 5 days starting 7/10 per nephrology)  Holding PTA Prednisone 5mg daily while on higher dose     #Lactic acidosis, resolved    Likely secondary to severe infection in setting of AHRF. LA on admission 3.3 --> 2.5 --> 1.4.      #Elevated troponins  #Type 2 demand ischemia   Likely secondary to demand ischemia in setting of severe infection and respiratory failure. Without chest pain or signs of fluid overload on exam. EKG without acute ischemic changes. POC ECHO without pericardial effusion and normal appearing ejection fraction.     #HFpEF  #CAD s/p CABG  #NSTEMI  NSTEMI in 2014. Most recent ECHO from 6/2020 with LVEF of 54%.     Plan:  Hold lasix 20mg daily while treating infection, may consider resuming on 7/12     #HTN     Plan:  PTA Metoprolol succinate 12.5mg daily  PTA Imdur 60 mg daily  PTA Aspirin 81 mg daily  Holding Nitroglycerin 0.4mg due to patient not taking     #HTN     Plan:  -rosuvastatin 20mg daily     #Chronic hepatitis B     Plan:  -entecavir 0.5mg daily     #GERD     Plan:  -Pantoprazole 40mg daily     #KIM     Plan:  PTA Atarax 10-25mg PRN  PTA Fluoxetine 20mg daily  PTA Fluoxetine 40mg daily     Diet: Combination Diet Low Saturated Fat Na <2400mg Diet, No Caffeine DietRegular diet  DVT Prophylaxis: Enoxaparin (Lovenox) SQ  Blackwood Catheter: Not present  Fluids: none  Lines: None     Cardiac Monitoring: None  Code Status: No CPR- Do NOT IntubateDNR/DNI  Diet: Combination Diet Low Saturated Fat Na <2400mg Diet, No Caffeine Diet    DVT Prophylaxis: Enoxaparin (Lovenox) SQ  Blackwood Catheter: Not present  Fluids: none   Lines: None     Cardiac Monitoring: None  Code Status: No CPR- Do NOT Intubate          Social Drivers of Health   Tobacco Use: Medium Risk (6/30/2025)    Patient History     Smoking Tobacco Use: Former     Smokeless Tobacco Use: Former     Passive Exposure: Never   Physical Activity: Insufficiently Active (11/5/2024)    Exercise Vital Sign      Days of Exercise per Week: 5 days     Minutes of Exercise per Session: 20 min   Stress: Stress Concern Present (11/5/2024)    [employer] [place] [employer] - Occupational Stress Questionnaire     Feeling of Stress : To some extent   Social Connections: Unknown (11/5/2024)    Social Connection and Isolation Panel [NHANES]     Frequency of Social Gatherings with Friends and Family: More than three times a week         Disposition Plan     Medically Ready for Discharge: Anticipated in 2-4 Days         The patient's care was discussed with the Attending Physician, Dr. Plasencia.    [facility]  Medical Student  Medicine Service, [address]  Securely message with [employer] (more info)  Text page via [facility] Paging/Directory   See signed in provider for up to date coverage information  ______________________________________________________________________    Interval History     Unclear whether his respiratory status feels better compared to when he came in. Feels more rested when sleeping with BIPAP. Does not feel systemically ill.     Physical Exam   Vital Signs: Temp: 98  F (36.7  C) Temp src: Axillary BP: (!) 142/95 Pulse: 82   Resp: (!) 34 SpO2: 97 % O2 Device: High Flow Nasal Cannula (HFNC) Oxygen Delivery: 30 LPM  Weight: 0 lbs 0 oz    Physical Exam  Vitals and nursing note reviewed.   Constitutional:       Appearance: Normal appearance. He is not ill-appearing or toxic-appearing.      Comments: Appears less distressed compared to prior exam   HENT:      Head: Normocephalic and atraumatic.      Right Ear: External ear normal.      Left Ear: External ear normal.      Nose: Nose normal.      Comments: Bipap mask in place     Mouth/Throat:      Mouth: Mucous membranes are moist.      Pharynx: Oropharynx is clear.   Eyes:      Extraocular Movements: Extraocular movements intact.      Conjunctiva/sclera: Conjunctivae normal.   Cardiovascular:      Rate  and Rhythm: Normal rate and regular rhythm.      Pulses: Normal pulses.      Heart sounds: Normal heart sounds.   Pulmonary:      Effort: Pulmonary effort is normal. No respiratory distress.      Breath sounds: Rhonchi (rhonchi at middle and lower posterior lung fields) present.      Comments: Scattered rhonchi at the bases, no wheezing  Abdominal:      General: Abdomen is flat. Bowel sounds are normal.      Tenderness: There is no abdominal tenderness.   Musculoskeletal:         General: No swelling.   Skin:     General: Skin is warm and dry.      Capillary Refill: Capillary refill takes less than 2 seconds.      Findings: No rash.   Neurological:      General: No focal deficit present.      Mental Status: He is alert. Mental status is at baseline.      Cranial Nerves: No cranial nerve deficit.   Psychiatric:         Mood and Affect: Mood normal.         Behavior: Behavior normal.         Medical Decision Making             Data

## 2025-07-11 NOTE — PROGRESS NOTES
[facility]  Transplant Nephrology Progress Note  Date of Admission:  7/9/2025  Today's Date: 07/11/2025  Requesting physician: Blake Plasencia MD    Recommendations:   - Continue current immunosuppression.  - No acute indication for dialysis.   - Appreciate Pulmonary input.    Assessment & Plan   # DDKT: Stable; good urine output.    - Baseline Creatinine: ~ 0.7-0.9   - Proteinuria: Normal (<0.2 grams)   - DSA Hx: Not checked recently due to time from transplant   - Last cPRA: unknown%   - BK Viremia: Not checked recently due to time from transplant   - Kidney Tx Biopsy Hx: No biopsy history.    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     # Immunosuppression: Mycophenolate mofetil (dose 750 mg every 12 hours) and Prednisone (dose 5 mg daily)   - Induction with Recent Transplant:  Not known due to time from transplant   - Continue with intensive monitoring of immunosuppression for efficacy and toxicity.   - Historical Changes in Immunosuppression: None   - Changes: No, continue reduced dose  mg bid at this time    # Infection Prevention:   Last CD4 Level: Not checked recently  - PJP: None      - CMV IgG Ab High Risk Discordance (D+/R-) at time of transplant: Unknown  Present CMV Serostatus: Unknown  - EBV IgG Ab High Risk Discordance (D+/R-) at time of transplant: Unknown  Present EBV Serostatus: Unknown    # Hypertension: Controlled;  Goal BP: < 140/90 (Hospitalization goal)   - Changes: Not at this time    # Anemia in Chronic Renal Disease: Hgb: Stable      CAROL ANN: No   - Iron studies: Not checked recently    # Mineral Bone Disorder:    - Calcium; level: Normal        On supplement: Yes  - Phosphorus; level: Normal        On supplement: No    # Electrolytes:  - Potassium; level: Normal        On supplement: No  - Magnesium; level: Normal        On supplement: No  - Bicarbonate; level: Normal        On supplement: No    # AHRF   Viral versus Atypical  Pneumonia  + Rhino/enterovirus. CT PE negative. Gram + Bcx on 07/09, likely contaminant. Repeat blood cultures 07/10. Empiric abx, nebs, solumedrol.    # Other Significant PMH:   - CAD, s/p CABG: Last cardiac stress test was abnormal in 6/2023 (but unchanged from prior testing in 2022).  Coronary angiogram 9/2021 that showed some possible obstructive disease in the 1st diagonal, but unable to stent it.  Bypass grafts were open.  Plan is for medical management.Last cardiac echo (8/2023) showed LVEF ~ 60-65%. Normal right ventricular systolic function. Normal diastolic dysfunction.    - Provoked PE (8/2023): after right hip revision surgery. Completed AC.   - Chronic Hepatitis B: Stable on entecavir. Follows here with hepatology.    - Asthma: Some increase in shortness of breath, but felt more cardiac in origin.  Patient is stable on inhalers.    - GERD: Asymptomatic on pantoprazole, but with h/o ulcer, will continue on medication.   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    Seen and discussed with Dr. Rachell Peña, PA-C  Transplant Nephrology    Physician Attestation     I saw and evaluated Jerad Ross as part of a shared APRN/PA visit.     I personally reviewed the vital signs, medications, and labs.    I personally provided a substantive portion of care for this patient and I approve the care plan as written by the ALISHA.  I was involved with Medical Decision Making including: Please see A&P for additional details of medical decision making.  MANAGEMENT DISCUSSED with the following over the past 24 hours: No acute indication for dialysis.  Would make no changes in  immunosuppression.  Appreciate Pulmonary input.     Nolan oLvett MD  Date of Service (when I saw the patient): 07/11/25      Interval History  Mr. Burgesss creatinine is 0.66 (07/11 0716); Stable.  Good urine output.  Other significant labs/tests/vitals: sbp 120-140. Overall feeling better today. Breathing has improved.   No events overnight.  No chest pain or shortness of breath.  No leg swelling.  No nausea and vomiting.  No fever, sweats or chills.    Review of Systems   4 point ROS was obtained and negative except as noted in the Interval History.    MEDICATIONS:  Current Facility-Administered Medications   Medication Dose Route Frequency Provider Last Rate Last Admin    aspirin EC tablet 81 mg  81 mg Oral Daily Whalen, Alona, DO   81 mg at 07/11/25 0811    calcium citrate-vitamin D (CITRACAL) 315-6.25 MG-MCG per tablet 1 tablet  1 tablet Oral Daily Whalen, Alona, DO   1 tablet at 07/11/25 0812    cefTRIAXone (ROCEPHIN) 2 g vial to attach to  ml bag for ADULTS or NS 50 ml bag for PEDS  2 g Intravenous Q24H Whalen, Alona, DO 0 mL/hr at 07/10/25 1309 2 g at 07/11/25 1300    doxycycline (VIBRAMYCIN) 100 mg vial to attach to  mL bag  100 mg Intravenous Q12H Whalen, Alona, DO        enoxaparin ANTICOAGULANT (LOVENOX) injection 40 mg  40 mg Subcutaneous Q24H Whalen, Alona, DO   40 mg at 07/10/25 1739    entecavir (BARACLUDE) tablet 0.5 mg  0.5 mg Oral Daily Whalen, Alona, DO   0.5 mg at 07/11/25 0812    FLUoxetine (PROzac) capsule 60 mg  60 mg Oral Daily Blake Plasencia MD   60 mg at 07/11/25 0811    fluticasone-vilanterol (BREO ELLIPTA) 100-25 MCG/ACT inhaler 1 puff  1 puff Inhalation Daily Whalen, Alona, DO        ipratropium - albuterol 0.5 mg/2.5 mg (3mg)/3 mL (DUONEB) neb solution 3 mL  3 mL Nebulization Q4H Blake Plasencia MD   3 mL at 07/11/25 1222    isosorbide mononitrate (IMDUR) 24 hr tablet 60 mg  60 mg Oral Daily Alona Whalen DO   60 mg at 07/11/25 0812    metoprolol succinate ER (TOPROL-XL) 24 hr half-tab  12.5 mg  12.5 mg Oral Daily Whalen, Alona, DO        multivitamin w/minerals (THERA-VIT-M) tablet 1 tablet  1 tablet Oral Daily Whalen, Alona, DO   1 tablet at 25 0812    mycophenolate (GENERIC EQUIVALENT) capsule 500 mg  500 mg Oral BID IS Lisa Levine APRN CNP   500 mg at 25 0812    naltrexone (DEPADE/REVIA) tablet 50 mg  50 mg Oral Daily Whalen, Alona, DO   50 mg at 25 0812    pantoprazole (PROTONIX) EC tablet 40 mg  40 mg Oral Daily Whalen, Alona, DO   40 mg at 25 0812    predniSONE (DELTASONE) tablet 40 mg  40 mg Oral Daily Blake Plasencia MD   40 mg at 25 0812    [Held by provider] predniSONE (DELTASONE) tablet 5 mg  5 mg Oral Daily Whalen, Alona, DO        psyllium (METAMUCIL/KONSYL) Packet 1 packet  1 packet Oral Daily Blake Plasencia MD   1 packet at 25 1300    rosuvastatin (CRESTOR) tablet 20 mg  20 mg Oral Daily Whalen, Alona, DO   20 mg at 25 0811    sodium chloride (PF) 0.9% PF flush 3 mL  3 mL Intracatheter Q8H NE Whalen, Alona, DO   3 mL at 25 1301     Current Facility-Administered Medications   Medication Dose Route Frequency Provider Last Rate Last Admin       Physical Exam   Temp  Av.3  F (36.8  C)  Min: 97.8  F (36.6  C)  Max: 98.8  F (37.1  C)      Pulse  Av  Min: 63  Max: 80 Resp  Av.9  Min: 18  Max: 44  FiO2 (%)  Av.2 %  Min: 50 %  Max: 65 %  SpO2  Av.2 %  Min: 90 %  Max: 99 %     BP (!) 142/95 (BP Location: Right arm)   Pulse 82   Temp 98  F (36.7  C) (Axillary)   Resp (!) 34   SpO2 97%     Admit       GENERAL APPEARANCE: alert and no distress  HENT: mouth without ulcers or lesions  RESP: lungs clear to auscultation - no rales, rhonchi or wheezes  CV: regular rhythm, normal rate, no rub, no murmur  EDEMA: no LE edema bilaterally  ABDOMEN: soft, nondistended, nontender  MS: extremities normal - no gross deformities noted, no evidence of inflammation in joints, no muscle tenderness  SKIN: no rash  TX KIDNEY: normal  DIALYSIS ACCESS:  LUE AV Fistula (No thrill)    Data   All labs reviewed by me.  CMP  Recent Labs   Lab 07/11/25  0716 07/10/25  0936 07/09/25  1038    137 140   POTASSIUM 3.8 3.9 3.4   CHLORIDE 103 102 104   CO2 24 19* 22   ANIONGAP 10 16* 14   * 103* 85   BUN 15.0 14.9 12.4   CR 0.66* 0.68 0.72   GFRESTIMATED >90 >90 >90   HEMA 9.2 8.9 9.3   MAG 2.1 1.9  --    PHOS 2.5 3.2  --    PROTTOTAL  --   --  6.6   ALBUMIN 3.1* 3.2* 3.5   BILITOTAL  --   --  1.0   ALKPHOS  --   --  82   AST  --   --  49*   ALT  --   --  23     CBC  Recent Labs   Lab 07/11/25  0716 07/10/25  0936 07/09/25  1038   HGB 12.1* 13.1* 14.0   WBC 12.3* 8.7 12.0*   RBC 4.30* 4.55 4.91   HCT 37.7* 41.3 44.0   MCV 88 91 90   MCH 28.1 28.8 28.5   MCHC 32.1 31.7 31.8   RDW 15.9* 16.0* 15.9*    252 275     INR  Recent Labs   Lab 07/09/25  1038   INR 1.03     ABG  Recent Labs   Lab 07/09/25  1647 07/09/25  1617 07/09/25  1038   PH  --  7.36  --    PCO2  --  39  --    PO2  --  74*  --    HCO3  --  22  --    O2PER 15 4 21      Urine Studies  Recent Labs   Lab Test 07/09/25  1346 08/30/23  1856 08/20/23  1245 08/11/23  1928 06/03/20  1307   COLOR Yellow Yellow Light Yellow Light Yellow Yellow   APPEARANCE Slightly Cloudy* Clear Clear Clear Clear   URINEGLC Negative Negative Negative Negative Negative   URINEBILI Negative Negative Negative Negative Negative   URINEKETONE Trace* 10* 60* 10* Negative   SG 1.015 1.021 1.015 1.009 1.008   UBLD Negative Negative Negative Negative Negative   URINEPH 6.0 5.0 6.0 6.0 6.5   PROTEIN 20* 10* 20* 10* Negative   UROBILINOGEN  --   --   --   --  <2.0 E.U./dL   NITRITE Negative Negative Negative Negative Negative   LEUKEST Negative Negative Negative Negative Negative   RBCU 1 <1 <1 0  --    WBCU 7* 3 1 3  --      Recent Labs   Lab Test 10/28/20  0902 10/01/19  0942 04/02/19  0917 10/23/18  1122 06/21/18  1209   [facility] 0.23* 0.26* 0.23* 0.21* 0.12     PTH  No lab results found.  Iron Studies  Recent Labs   Lab Test  10/02/23  0943   IRON 30*      IRONSAT 10*   LA 50       IMAGING:  All imaging studies reviewed by me.

## 2025-07-12 LAB
BACTERIA SPEC CULT: ABNORMAL
BACTERIA SPEC CULT: ABNORMAL
BACTERIA SPT CULT: ABNORMAL
GRAM STAIN RESULT: ABNORMAL
H CAPSUL AG UR QL IA: NOT DETECTED
H CAPSUL AG UR-MCNC: NOT DETECTED NG/ML

## 2025-07-12 PROCEDURE — 250N000013 HC RX MED GY IP 250 OP 250 PS 637

## 2025-07-12 PROCEDURE — 250N000012 HC RX MED GY IP 250 OP 636 PS 637: Performed by: NURSE PRACTITIONER

## 2025-07-12 PROCEDURE — 99232 SBSQ HOSP IP/OBS MODERATE 35: CPT | Mod: GC | Performed by: STUDENT IN AN ORGANIZED HEALTH CARE EDUCATION/TRAINING PROGRAM

## 2025-07-12 PROCEDURE — 94640 AIRWAY INHALATION TREATMENT: CPT

## 2025-07-12 PROCEDURE — 999N000157 HC STATISTIC RCP TIME EA 10 MIN

## 2025-07-12 PROCEDURE — 250N000009 HC RX 250: Performed by: STUDENT IN AN ORGANIZED HEALTH CARE EDUCATION/TRAINING PROGRAM

## 2025-07-12 PROCEDURE — 94640 AIRWAY INHALATION TREATMENT: CPT | Mod: 76

## 2025-07-12 PROCEDURE — 87070 CULTURE OTHR SPECIMN AEROBIC: CPT

## 2025-07-12 PROCEDURE — 250N000011 HC RX IP 250 OP 636

## 2025-07-12 PROCEDURE — 250N000012 HC RX MED GY IP 250 OP 636 PS 637: Performed by: STUDENT IN AN ORGANIZED HEALTH CARE EDUCATION/TRAINING PROGRAM

## 2025-07-12 PROCEDURE — 94660 CPAP INITIATION&MGMT: CPT

## 2025-07-12 PROCEDURE — 120N000011 HC R&B TRANSPLANT UMMC

## 2025-07-12 PROCEDURE — 97116 GAIT TRAINING THERAPY: CPT | Mod: GP

## 2025-07-12 PROCEDURE — 250N000013 HC RX MED GY IP 250 OP 250 PS 637: Performed by: STUDENT IN AN ORGANIZED HEALTH CARE EDUCATION/TRAINING PROGRAM

## 2025-07-12 PROCEDURE — 250N000009 HC RX 250

## 2025-07-12 PROCEDURE — 99233 SBSQ HOSP IP/OBS HIGH 50: CPT | Performed by: INTERNAL MEDICINE

## 2025-07-12 PROCEDURE — 97530 THERAPEUTIC ACTIVITIES: CPT | Mod: GP

## 2025-07-12 RX ORDER — DOXYCYCLINE 100 MG/1
100 CAPSULE ORAL EVERY 12 HOURS SCHEDULED
Status: DISCONTINUED | OUTPATIENT
Start: 2025-07-12 | End: 2025-07-13

## 2025-07-12 RX ORDER — IPRATROPIUM BROMIDE AND ALBUTEROL SULFATE 2.5; .5 MG/3ML; MG/3ML
3 SOLUTION RESPIRATORY (INHALATION)
Status: DISCONTINUED | OUTPATIENT
Start: 2025-07-12 | End: 2025-07-20

## 2025-07-12 RX ORDER — GUAIFENESIN 200 MG/10ML
200 LIQUID ORAL EVERY 4 HOURS PRN
Status: DISCONTINUED | OUTPATIENT
Start: 2025-07-12 | End: 2025-07-21 | Stop reason: HOSPADM

## 2025-07-12 RX ORDER — CALCIUM CITRATE/VITAMIN D3 315MG-6.25
1 TABLET ORAL
Status: DISCONTINUED | OUTPATIENT
Start: 2025-07-12 | End: 2025-07-21 | Stop reason: HOSPADM

## 2025-07-12 RX ORDER — POLYETHYLENE GLYCOL 3350 17 G/17G
17 POWDER, FOR SOLUTION ORAL DAILY
Status: DISCONTINUED | OUTPATIENT
Start: 2025-07-12 | End: 2025-07-21 | Stop reason: HOSPADM

## 2025-07-12 RX ORDER — CEFTRIAXONE 2 G/1
2 INJECTION, POWDER, FOR SOLUTION INTRAMUSCULAR; INTRAVENOUS EVERY 24 HOURS
Status: DISCONTINUED | OUTPATIENT
Start: 2025-07-12 | End: 2025-07-13

## 2025-07-12 RX ORDER — IPRATROPIUM BROMIDE AND ALBUTEROL SULFATE 2.5; .5 MG/3ML; MG/3ML
3 SOLUTION RESPIRATORY (INHALATION)
Status: DISCONTINUED | OUTPATIENT
Start: 2025-07-12 | End: 2025-07-12

## 2025-07-12 RX ORDER — MULTIPLE VITAMINS W/ MINERALS TAB 9MG-400MCG
1 TAB ORAL
Status: DISCONTINUED | OUTPATIENT
Start: 2025-07-12 | End: 2025-07-21 | Stop reason: HOSPADM

## 2025-07-12 RX ORDER — CEFTRIAXONE 2 G/1
2 INJECTION, POWDER, FOR SOLUTION INTRAMUSCULAR; INTRAVENOUS EVERY 24 HOURS
Status: DISCONTINUED | OUTPATIENT
Start: 2025-07-12 | End: 2025-07-12

## 2025-07-12 RX ORDER — POLYETHYLENE GLYCOL 3350 17 G/17G
17 POWDER, FOR SOLUTION ORAL DAILY
Status: DISCONTINUED | OUTPATIENT
Start: 2025-07-12 | End: 2025-07-12

## 2025-07-12 RX ADMIN — PSYLLIUM HUSK 1 PACKET: 3.4 POWDER ORAL at 09:11

## 2025-07-12 RX ADMIN — PANTOPRAZOLE SODIUM 40 MG: 40 TABLET, DELAYED RELEASE ORAL at 09:12

## 2025-07-12 RX ADMIN — ROSUVASTATIN CALCIUM 20 MG: 20 TABLET, FILM COATED ORAL at 09:11

## 2025-07-12 RX ADMIN — DOXYCYCLINE HYCLATE 100 MG: 100 CAPSULE ORAL at 20:08

## 2025-07-12 RX ADMIN — GUAIFENESIN 200 MG: 200 SOLUTION ORAL at 09:09

## 2025-07-12 RX ADMIN — ENTECAVIR 0.5 MG: 0.5 TABLET ORAL at 09:12

## 2025-07-12 RX ADMIN — IPRATROPIUM BROMIDE AND ALBUTEROL SULFATE 3 ML: .5; 3 SOLUTION RESPIRATORY (INHALATION) at 03:40

## 2025-07-12 RX ADMIN — ACETAMINOPHEN 650 MG: 325 TABLET ORAL at 03:58

## 2025-07-12 RX ADMIN — GUAIFENESIN 200 MG: 200 SOLUTION ORAL at 14:11

## 2025-07-12 RX ADMIN — ISOSORBIDE MONONITRATE 60 MG: 60 TABLET, EXTENDED RELEASE ORAL at 09:12

## 2025-07-12 RX ADMIN — NALTREXONE HYDROCHLORIDE 50 MG: 50 TABLET, FILM COATED ORAL at 09:12

## 2025-07-12 RX ADMIN — MYCOPHENOLATE MOFETIL 500 MG: 250 CAPSULE ORAL at 18:15

## 2025-07-12 RX ADMIN — IPRATROPIUM BROMIDE AND ALBUTEROL SULFATE 3 ML: .5; 3 SOLUTION RESPIRATORY (INHALATION) at 08:32

## 2025-07-12 RX ADMIN — GUAIFENESIN 200 MG: 200 SOLUTION ORAL at 20:08

## 2025-07-12 RX ADMIN — CEFTRIAXONE SODIUM 2 G: 2 INJECTION, POWDER, FOR SOLUTION INTRAMUSCULAR; INTRAVENOUS at 12:07

## 2025-07-12 RX ADMIN — Medication 12.5 MG: at 20:08

## 2025-07-12 RX ADMIN — IPRATROPIUM BROMIDE AND ALBUTEROL SULFATE 3 ML: .5; 3 SOLUTION RESPIRATORY (INHALATION) at 17:14

## 2025-07-12 RX ADMIN — GUAIFENESIN 200 MG: 200 SOLUTION ORAL at 03:58

## 2025-07-12 RX ADMIN — IPRATROPIUM BROMIDE AND ALBUTEROL SULFATE 3 ML: .5; 3 SOLUTION RESPIRATORY (INHALATION) at 19:51

## 2025-07-12 RX ADMIN — DOXYCYCLINE HYCLATE 100 MG: 100 CAPSULE ORAL at 09:12

## 2025-07-12 RX ADMIN — Medication 1 TABLET: at 12:00

## 2025-07-12 RX ADMIN — PREDNISONE 40 MG: 20 TABLET ORAL at 09:12

## 2025-07-12 RX ADMIN — IPRATROPIUM BROMIDE AND ALBUTEROL SULFATE 3 ML: .5; 3 SOLUTION RESPIRATORY (INHALATION) at 00:42

## 2025-07-12 RX ADMIN — ACETAMINOPHEN 650 MG: 325 TABLET ORAL at 16:59

## 2025-07-12 RX ADMIN — IPRATROPIUM BROMIDE AND ALBUTEROL SULFATE 3 ML: .5; 3 SOLUTION RESPIRATORY (INHALATION) at 13:03

## 2025-07-12 RX ADMIN — FLUOXETINE HYDROCHLORIDE 60 MG: 40 CAPSULE ORAL at 09:12

## 2025-07-12 RX ADMIN — ACETAMINOPHEN 650 MG: 325 TABLET ORAL at 11:59

## 2025-07-12 RX ADMIN — ENOXAPARIN SODIUM 40 MG: 40 INJECTION SUBCUTANEOUS at 16:53

## 2025-07-12 RX ADMIN — MYCOPHENOLATE MOFETIL 500 MG: 250 CAPSULE ORAL at 09:12

## 2025-07-12 RX ADMIN — ASPIRIN 81 MG CHEWABLE TABLET 81 MG: 81 TABLET CHEWABLE at 09:11

## 2025-07-12 ASSESSMENT — ACTIVITIES OF DAILY LIVING (ADL)
ADLS_ACUITY_SCORE: 44

## 2025-07-12 NOTE — PROGRESS NOTES
[facility]    Progress Note - Medicine Service, NUBIA TEAM 2       Date of Admission:  7/9/2025    Assessment & Plan   65 year old male with PMHx of NSTEMI, CAD s/p CABG (2020), possible COPD, on chronic immunosuppression s/p renal transplant x2 presenting with worsening dyspnea and cough found to have positive resp PCR panel for Human Rhin/Enterovirus with bilateral ground glass opacities c/f possible viral vs atypical pneumonia, admitted for AHRF secondary to undifferentiated pneumonia. Treating for possible CAP and COPD exacerbation with IV antibiotics, steroids and duonebs.      Changes Today:  - Wean oxygen as able  - Collect repeat sputum sample if able  - Duonebs q4 while awake  - Miralax scheduled for bowel regimen     #AHRF  #C/f viral vs atypical pneumonia  Patient presenting with worsening dyspnea at rest and nonproductive cough for past 3 days and found to be hypoxemic on arrival with significant respiratory distress ultimately needing Bipap for respiratory support. Workup significant for mild Leukocytosis and elevated inflammatory markers with imaging showing ground glass opacities on CT. RPP positive for rhinovirus/enterovirus. Strep pneumo and legionella urine antigen negative. Suspect clinical presentation secondary viral or atypical pneumonia. Would be atypical for rhino/enterovirus to be causing this significant of a lower respiratory tract infection so will continue with more broad workup. Reassuringly CT PE negative for PE. Given ongoing infectious workup, will continue empiric IV antibiotic course for CAP coverage and atypical causes with doxycycline. Blood cultures came back positive for MRSE, likely contamination, repeat cultures no growth to date. Start oxygen weaning trials to determine if his respiratory status is improving.    Plan:  -Continue Ceftriaxone (7/9-7/13*)  -Continue Doxycycline (7/11-7/16*)  -Duonebs q4h while  awake  -Solumedrol 125mg IV 7/9, prednisone 40mg PO daily (7/10 - ). Will determine length of steroids on clinical progress.   -Repeat BC 7/10 no growth at 24 hour  -Pulm consult  Continue doxycycline and ceftriaxone (azithromycin stopped due to prolonged Qtc)  Duonebs q4h while awake (ordered)  Continue breo (ordered)  Methylpred 125 mg IV x1, prednisone 40 mg (7/10 - )  Does not warrant bronchoscopy at the moment  -Work up thus far:   Respiratory viral panel (positive for rhino/entero)  COVID, Influenza (negative)  Sputum culture 7/9 contaminated; will try for repeat collection 7/12   Urine histo and blasto ab, ag (negative)  Urine strep and legionella (negative)  B d glucan and aspergillosis (negative)  Procalcitonin 0.16  MRSA swab (negative)                #COPD exacerbation   Reviewed PFTs from 4/2018 showed moderate restriction with FVC 63%. Reports a 5 pack year smoking history. Unclear if patient has obstructive lung disease. Diagnosis of COPD appears to come from inaccurate health form filled out by patient years ago. Ideally get updated PFTs and DCLO testing. Interestingly he has had improvement with Bipap, but did not have evidence of CO2 retention on gases suggesting less obstructive pathology.      Plan:  -steroids, antibiotics as above   -Duonebs q4h while awake  -Will continue PTA COPD inhalers for now  -PTA Breo Ellipta 1 puff/day  -PTA albuterol PRN     #Hx of renal transplant   #chronic immunosuppression  Hx of renal failure on dialysis at age 16, and kidney transplant, infected with hepatitis B from dialysis.      Plan:  Reduce PTA Mycophenolate 500mg BID (decreased dose for 5 days starting 7/10-7/14*, per nephrology)  Holding PTA Prednisone 5mg daily while on higher dose      #Lactic acidosis, resolved    Likely secondary to severe infection in setting of AHRF. LA on admission 3.3 --> 2.5 --> 1.4.      #Elevated troponins  #Type 2 demand ischemia   Likely secondary to demand ischemia in setting  "of severe infection and respiratory failure. Without chest pain or signs of fluid overload on exam. EKG without acute ischemic changes. POC ECHO without pericardial effusion and normal appearing ejection fraction.     #HFpEF  #CAD s/p CABG  #NSTEMI  NSTEMI in 2014. Most recent ECHO from 6/2020 with LVEF of 54%.     Plan:  Hold lasix 20mg daily while treating infection, may consider resuming on 7/12  PTA Aspirin 81 mg daily  Holding Nitroglycerin 0.4mg due to patient not taking     #HTN     Plan:  PTA Metoprolol succinate 12.5mg daily  PTA Imdur 60 mg daily       #HLD     Plan:  -rosuvastatin 20mg daily     #Chronic hepatitis B     Plan:  -entecavir 0.5mg daily     #GERD     Plan:  -Pantoprazole 40mg daily     #KIM     Plan:  PTA Atarax 10-25mg PRN  PTA Fluoxetine 20mg daily  PTA Fluoxetine 40mg daily           Diet: Combination Diet Low Saturated Fat Na <2400mg Diet, No Caffeine Diet    DVT Prophylaxis: Enoxaparin (Lovenox) SQ  [place] Catheter: Not present  Fluids: none  Lines: None     Cardiac Monitoring: None  Code Status: No CPR- Do NOT Intubate      Clinically Significant Risk Factors               # Hypoalbuminemia: Lowest albumin = 3.1 g/dL at 7/11/2025  7:16 AM, will monitor as appropriate     # Hypertension: Noted on problem list            # Overweight: Estimated body mass index is 26.91 kg/m  as calculated from the following:    Height as of 6/30/25: 1.702 m (5' 7\").    Weight as of 6/30/25: 77.9 kg (171 lb 12.8 oz)., PRESENT ON ADMISSION            Social Drivers of Health   Tobacco Use: Medium Risk (6/30/2025)    Patient History     Smoking Tobacco Use: Former     Smokeless Tobacco Use: Former     Passive Exposure: Never   Physical Activity: Insufficiently Active (11/5/2024)    Exercise Vital Sign     Days of Exercise per Week: 5 days     Minutes of Exercise per Session: 20 min   Stress: Stress Concern Present (11/5/2024)    [employer] [place] [employer] - Occupational Stress Questionnaire     " Feeling of Stress : To some extent   Social Connections: Unknown (11/5/2024)    Social Connection and Isolation Panel [NHANES]     Frequency of Social Gatherings with Friends and Family: More than three times a week         Disposition Plan     Medically Ready for Discharge: Anticipated in 2-4 Days       The patient's care was discussed with the Attending Physician, Dr. Plasencia.    Alona Whalen, DO  Medicine Service, [facility], [facility]–1983 2  [facility]  Securely message with [employer] (more info)  Text page via [employer] Paging/Directory   See signed in provider for up to date coverage information  ______________________________________________________________________    Interval History   No acute events overnight. Found on HFNC, 40LPM, KoH123%, resting comfortably in bed. Jerad endorsing increased frequency of cough with increased sputum production. Didn't get good sleep because of the cough. Has some associated epigastric abdominal pain with coughing. No fever, chills, hemoptysis, chest pain. Work of breathing is about the same as yesterday. Feels like the duonebs minimally improve with work of breathing.    Physical Exam   Vital Signs: Temp: 99.2  F (37.3  C) Temp src: Oral BP: 135/69 Pulse: 82   Resp: 20 SpO2: (!) 91 % O2 Device: High Flow Nasal Cannula (HFNC) Oxygen Delivery: 40 LPM  Weight: 0 lbs 0 oz    .Physical Exam  Vitals reviewed.   Constitutional:       General: He is not in acute distress.     Appearance: He is not toxic-appearing.   HENT:      Head: Normocephalic and atraumatic.   Cardiovascular:      Rate and Rhythm: Normal rate and regular rhythm.      Heart sounds: Normal heart sounds.   Pulmonary:      Effort: No accessory muscle usage.      Breath sounds: Examination of the right-middle field reveals rhonchi. Examination of the left-middle field reveals rhonchi. Examination of the right-lower field reveals rhonchi. Examination of the left-lower field reveals rhonchi.  Rhonchi present.   Neurological:      Mental Status: He is alert.          Medical Decision Making       Please see A&P for additional details of medical decision making.      Data         Imaging results reviewed over the past 24 hrs:   No results found for this or any previous visit (from the past 24 hours).

## 2025-07-12 NOTE — PLAN OF CARE
Goal Outcome Evaluation:      Plan of Care Reviewed With: patient    Overall Patient Progress: no change    Outcome Evaluation: CPAP overnight      BP (!) 142/88 (BP Location: Right arm)   Pulse 69   Temp 97.1  F (36.2  C) (Axillary)   Resp 20   SpO2 99%     Shift: 8007-2382  Isolation Status: Droplet   VS: VSS, afebrile  Neuro: Aox4  Behaviors: calm, cooperative, & pleasant   BG: N/A  Labs: WBC 12.3  Respiratory: CPAP switched to high flow @0330 due to increased and intense coughing.    Cardiac: WDL  Pain/Nausea: 4/10 abdominal pain d/t coughing  PRN: tylenol, robitussin   Diet: Low sat fat, no caffeine, NA less than 2400 mg  IV Access: R PIV   Infusion(s): SL   GI/: Voids   Skin: scattered atypical nevi/melanoma?   Mobility: SBA  Plan: Continue with POC

## 2025-07-12 NOTE — PROGRESS NOTES
[facility]  Transplant Nephrology Progress Note  Date of Admission:  7/9/2025  Today's Date: 07/12/2025    Recommendations:   - No acute indications for dialysis.  - Would make no changes in immunosuppression.  - Appreciate Pulmonary input.    Assessment & Plan   # DDKT: Stable creatinine with last check. Good urine output.  No acute indications for dialysis.   - Baseline Creatinine: ~ 0.7-0.9   - Proteinuria: Normal (<0.2 grams)   - DSA Hx: Not checked recently due to time from transplant   - Last cPRA: unknown%   - BK Viremia: Not checked recently due to time from transplant   - Kidney Tx Biopsy Hx: No biopsy history.    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     # Immunosuppression Prior to Admission: Mycophenolate mofetil (dose 750 mg every 12 hours) and Prednisone (dose 5 mg daily)   - Present Immunosuppression: Mycophenolate mofetil (dose 500 mg every 12 hours) and Prednisone (dose 5 mg daily)    - Induction with Recent Transplant:  Not known due to time from transplant   - Continue with intensive monitoring of immunosuppression for efficacy and toxicity.   - Historical Changes in Immunosuppression: None   - Changes: Not at this time    # Infection Prevention:   Last CD4 Level: Not checked recently  - PJP: None      - CMV IgG Ab High Risk Discordance (D+/R-) at time of transplant: Unknown  Present CMV Serostatus: Unknown  - EBV IgG Ab High Risk Discordance (D+/R-) at time of transplant: Unknown  Present EBV Serostatus: Unknown    # Hypertension: Controlled;  Goal BP: < 140/90 (Hospitalization goal)   - Changes: Not at this time    # Anemia in Chronic Renal Disease: Hgb: Stable      CAROL ANN: No   - Iron studies: Not checked recently    # Mineral Bone Disorder:    - Calcium; level: Normal        On supplement: Yes  - Phosphorus; level: Normal        On supplement: No    # Electrolytes:  - Potassium; level: Normal        On supplement: No  - Magnesium;  level: Normal        On supplement: No  - Bicarbonate; level: Normal        On supplement: No    # AHRF   Viral versus Atypical Pneumonia:  + Rhino/enterovirus. CT PE negative. Gram + Bcx on 07/09, likely contaminant. Repeat blood cultures 07/10. Empiric abx, nebs, solumedrol.    # Other Significant PMH:   - CAD, s/p CABG: Last cardiac stress test was abnormal in 6/2023 (but unchanged from prior testing in 2022).  Coronary angiogram 9/2021 that showed some possible obstructive disease in the 1st diagonal, but unable to stent it.  Bypass grafts were open.  Plan is for medical management.Last cardiac echo (8/2023) showed LVEF ~ 60-65%. Normal right ventricular systolic function. Normal diastolic dysfunction.    - Provoked PE (8/2023): after right hip revision surgery. Completed AC.   - Chronic Hepatitis B: Stable on entecavir. Follows here with hepatology.    - Asthma: Some increase in shortness of breath, but felt more cardiac in origin.  Patient is stable on inhalers.    - GERD: Asymptomatic on pantoprazole, but with h/o ulcer, will continue on medication.   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    Nolan Lovett MD  Transplant Nephrology  Contact information via Vocera Web Console or via [facility] Paging/Directory      Interval History  Mr. Burgesss creatinine is 0.66 (07/11 0716); Stable with labs yesterday.  Good urine output.  No new events overnight.  No chest pain and decreased shortness of breath.  Has a productive cough.  No leg swelling.  No nausea and vomiting.  Bowel movements are none today.  No fever, sweats or chills.    Review of Systems   4 point ROS was obtained  and negative except as noted in the Interval History.    MEDICATIONS:  Current Facility-Administered Medications   Medication Dose Route Frequency Provider Last Rate Last Admin    aspirin EC tablet 81 mg  81 mg Oral Daily Whalen, Alona, DO   81 mg at 07/12/25 0911    calcium citrate-vitamin D (CITRACAL) 315-6.25 MG-MCG per tablet 1 tablet  1 tablet Oral Daily with lunch Blake Plasencia MD        cefTRIAXone (ROCEPHIN) 2 g vial to attach to  ml bag for ADULTS or NS 50 ml bag for PEDS  2 g Intravenous Q24H Whalen, Alona, DO        doxycycline hyclate (VIBRAMYCIN) capsule 100 mg  100 mg Oral Q12H [facility] (08/20) Whalen, Alona, DO   100 mg at 07/12/25 0912    enoxaparin ANTICOAGULANT (LOVENOX) injection 40 mg  40 mg Subcutaneous Q24H Whalen, Alona, DO   40 mg at 07/11/25 1753    entecavir (BARACLUDE) tablet 0.5 mg  0.5 mg Oral Daily Whalen, Alona, DO   0.5 mg at 07/12/25 0912    FLUoxetine (PROzac) capsule 60 mg  60 mg Oral Daily Blake Plasencia MD   60 mg at 07/12/25 0912    fluticasone-vilanterol (BREO ELLIPTA) 100-25 MCG/ACT inhaler 1 puff  1 puff Inhalation Daily Whalen, Alona, DO        ipratropium - albuterol 0.5 mg/2.5 mg (3mg)/3 mL (DUONEB) neb solution 3 mL  3 mL Nebulization Q4H While awake Michael Mas DO        isosorbide mononitrate (IMDUR) 24 hr tablet 60 mg  60 mg Oral Daily Whalen, Alona, DO   60 mg at 07/12/25 0912    metoprolol succinate ER (TOPROL-XL) 24 hr half-tab 12.5 mg  12.5 mg Oral Daily Whalen, Alona, DO   12.5 mg at 07/11/25 1822    multivitamin w/minerals (THERA-VIT-M) tablet 1 tablet  1 tablet Oral Daily with lunch Blake Plasencia MD        mycophenolate (GENERIC EQUIVALENT) capsule 500 mg  500 mg Oral BID IS Lisa Levine APRN CNP   500 mg at 07/12/25 0912    naltrexone (DEPADE/REVIA) tablet 50 mg  50 mg Oral Daily Whalen, Alona, DO   50 mg at 07/12/25 0912    pantoprazole (PROTONIX) EC tablet 40 mg  40 mg Oral Daily Whalen, Alona, DO   40 mg at 07/12/25 0912    polyethylene glycol (MIRALAX) Packet 17 g  17 g  Oral Daily Neo Masor, DO        polyethylene glycol (MIRALAX) Packet 17 g  17 g Oral Daily Whalen, Alona, DO        predniSONE (DELTASONE) tablet 40 mg  40 mg Oral Daily Blake Plasencia MD   40 mg at 25 0912    [Held by provider] predniSONE (DELTASONE) tablet 5 mg  5 mg Oral Daily Whalen, Alona, DO        psyllium (METAMUCIL/KONSYL) Packet 1 packet  1 packet Oral Daily Blake Plasencia MD   1 packet at 25 0911    rosuvastatin (CRESTOR) tablet 20 mg  20 mg Oral Daily Whalen, Alona, DO   20 mg at 25 0911    sodium chloride (PF) 0.9% PF flush 3 mL  3 mL Intracatheter Q8H NE Whalen, Alona, DO   3 mL at 25 1301     Current Facility-Administered Medications   Medication Dose Route Frequency Provider Last Rate Last Admin       Physical Exam   Temp  Av.3  F (36.8  C)  Min: 97.8  F (36.6  C)  Max: 98.8  F (37.1  C)      Pulse  Av  Min: 63  Max: 80 Resp  Av.9  Min: 18  Max: 44  FiO2 (%)  Av.2 %  Min: 50 %  Max: 65 %  SpO2  Av.2 %  Min: 90 %  Max: 99 %     /69 (BP Location: Right arm)   Pulse 82   Temp 99.2  F (37.3  C) (Oral)   Resp 20   SpO2 (!) 91%     Admit       GENERAL APPEARANCE: alert and no distress  HENT: mouth without ulcers or lesions; O2 via HFNC  RESP: lungs clear to auscultation - no rales, rhonchi or wheezes  CV: regular rhythm, normal rate, no rub, no murmur  EDEMA: no LE edema bilaterally  ABDOMEN: soft, nondistended, nontender  MS: extremities normal - no gross deformities noted, no evidence of inflammation in joints, no muscle tenderness  SKIN: no rash, numerous keratoses  TX KIDNEY: normal  DIALYSIS ACCESS: LUE AV Fistula (No thrill)    Data   All labs reviewed by me.  CMP  Recent Labs   Lab 25  0716 07/10/25  0936 25  1038    137 140   POTASSIUM 3.8 3.9 3.4   CHLORIDE 103 102 104   CO2 24 19* 22   ANIONGAP 10 16* 14   * 103* 85   BUN 15.0 14.9 12.4   CR 0.66* 0.68 0.72   GFRESTIMATED >90 >90 >90   HEMA 9.2 8.9 9.3   MAG 2.1 1.9   --    PHOS 2.5 3.2  --    PROTTOTAL  --   --  6.6   ALBUMIN 3.1* 3.2* 3.5   BILITOTAL  --   --  1.0   ALKPHOS  --   --  82   AST  --   --  49*   ALT  --   --  23     CBC  Recent Labs   Lab 07/11/25  0716 07/10/25  0936 07/09/25  1038   HGB 12.1* 13.1* 14.0   WBC 12.3* 8.7 12.0*   RBC 4.30* 4.55 4.91   HCT 37.7* 41.3 44.0   MCV 88 91 90   MCH 28.1 28.8 28.5   MCHC 32.1 31.7 31.8   RDW 15.9* 16.0* 15.9*    252 275     INR  Recent Labs   Lab 07/09/25  1038   INR 1.03     ABG  Recent Labs   Lab 07/09/25  1647 07/09/25  1617 07/09/25  1038   PH  --  7.36  --    PCO2  --  39  --    PO2  --  74*  --    HCO3  --  22  --    O2PER 15 4 21      Urine Studies  Recent Labs   Lab Test 07/09/25  1346 08/30/23  1856 08/20/23  1245 08/11/23  1928 06/03/20  1307   COLOR Yellow Yellow Light Yellow Light Yellow Yellow   APPEARANCE Slightly Cloudy* Clear Clear Clear Clear   URINEGLC Negative Negative Negative Negative Negative   URINEBILI Negative Negative Negative Negative Negative   URINEKETONE Trace* 10* 60* 10* Negative   SG 1.015 1.021 1.015 1.009 1.008   UBLD Negative Negative Negative Negative Negative   URINEPH 6.0 5.0 6.0 6.0 6.5   PROTEIN 20* 10* 20* 10* Negative   UROBILINOGEN  --   --   --   --  <2.0 E.U./dL   NITRITE Negative Negative Negative Negative Negative   LEUKEST Negative Negative Negative Negative Negative   RBCU 1 <1 <1 0  --    WBCU 7* 3 1 3  --      Recent Labs   Lab Test 10/28/20  0902 10/01/19  0942 04/02/19  0917 10/23/18  1122 06/21/18  1209   UTPG 0.23* 0.26* 0.23* 0.21* 0.12     PTH  No lab results found.  Iron Studies  Recent Labs   Lab Test 10/02/23  0943   IRON 30*      IRONSAT 10*   LA 50       IMAGING:  All imaging studies reviewed by me.

## 2025-07-12 NOTE — PLAN OF CARE
Goal Outcome Evaluation:    Plan of Care Reviewed With: patient  Overall Patient Progress: no change  Outcome Evaluation: still on high flow, getting IV abx    /69 (BP Location: Right arm)   Pulse 82   Temp 99.2  F (37.3  C) (Oral)   Resp 20   SpO2 (!) 91%     Shift: 5778-0663  Isolation Status: droplet  VS: VSS on 40% FiO2, afebrile  Neuro: AOx4  Behaviors: receptive to cares, calling appropriately   BG: n/a  Labs/Imaging: sputum sample sent today  Respiratory: WDL  Cardiac: WDL  Pain/Nausea: complaining of headache (4/10)  PRNs: robitussin x2, tylenol x1 for headache  Diet: regular w/ good appetite  LDA: PIV x1  Infusion(s): TKO w/ abx  GI/: LBM 7/12, voiding spontaneously  Skin: moles/dark spots throughout  Mobility: up to commode to have BM, did not ambulate d/t O2 needs  Plan: continue to wean O2    Handoff given to following RN.

## 2025-07-13 ENCOUNTER — APPOINTMENT (OUTPATIENT)
Dept: GENERAL RADIOLOGY | Facility: CLINIC | Age: 65
DRG: 193 | End: 2025-07-13
Payer: COMMERCIAL

## 2025-07-13 LAB
ALBUMIN SERPL BCG-MCNC: 3.2 G/DL (ref 3.5–5.2)
ANION GAP SERPL CALCULATED.3IONS-SCNC: 11 MMOL/L (ref 7–15)
ANION GAP SERPL CALCULATED.3IONS-SCNC: 15 MMOL/L (ref 7–15)
BUN SERPL-MCNC: 14.2 MG/DL (ref 8–23)
BUN SERPL-MCNC: 14.7 MG/DL (ref 8–23)
CALCIUM SERPL-MCNC: 9.6 MG/DL (ref 8.8–10.4)
CALCIUM SERPL-MCNC: 9.7 MG/DL (ref 8.8–10.4)
CHLORIDE SERPL-SCNC: 93 MMOL/L (ref 98–107)
CHLORIDE SERPL-SCNC: 98 MMOL/L (ref 98–107)
CREAT SERPL-MCNC: 0.64 MG/DL (ref 0.67–1.17)
CREAT SERPL-MCNC: 0.66 MG/DL (ref 0.67–1.17)
EGFRCR SERPLBLD CKD-EPI 2021: >90 ML/MIN/1.73M2
EGFRCR SERPLBLD CKD-EPI 2021: >90 ML/MIN/1.73M2
ERYTHROCYTE [DISTWIDTH] IN BLOOD BY AUTOMATED COUNT: 15.2 % (ref 10–15)
GLUCOSE SERPL-MCNC: 110 MG/DL (ref 70–99)
GLUCOSE SERPL-MCNC: 206 MG/DL (ref 70–99)
HCO3 SERPL-SCNC: 26 MMOL/L (ref 22–29)
HCO3 SERPL-SCNC: 26 MMOL/L (ref 22–29)
HCT VFR BLD AUTO: 40.3 % (ref 40–53)
HGB BLD-MCNC: 12.7 G/DL (ref 13.3–17.7)
MAGNESIUM SERPL-MCNC: 1.8 MG/DL (ref 1.7–2.3)
MCH RBC QN AUTO: 28.3 PG (ref 26.5–33)
MCHC RBC AUTO-ENTMCNC: 31.5 G/DL (ref 31.5–36.5)
MCV RBC AUTO: 90 FL (ref 78–100)
NT-PROBNP SERPL-MCNC: 1889 PG/ML (ref 0–177)
PHOSPHATE SERPL-MCNC: 2.9 MG/DL (ref 2.5–4.5)
PLATELET # BLD AUTO: 265 10E3/UL (ref 150–450)
POTASSIUM SERPL-SCNC: 3.4 MMOL/L (ref 3.4–5.3)
POTASSIUM SERPL-SCNC: 3.8 MMOL/L (ref 3.4–5.3)
PROCALCITONIN SERPL IA-MCNC: 0.06 NG/ML
RBC # BLD AUTO: 4.48 10E6/UL (ref 4.4–5.9)
SODIUM SERPL-SCNC: 134 MMOL/L (ref 135–145)
SODIUM SERPL-SCNC: 135 MMOL/L (ref 135–145)
WBC # BLD AUTO: 11.6 10E3/UL (ref 4–11)

## 2025-07-13 PROCEDURE — 250N000012 HC RX MED GY IP 250 OP 636 PS 637: Performed by: STUDENT IN AN ORGANIZED HEALTH CARE EDUCATION/TRAINING PROGRAM

## 2025-07-13 PROCEDURE — 71046 X-RAY EXAM CHEST 2 VIEWS: CPT | Mod: 26 | Performed by: RADIOLOGY

## 2025-07-13 PROCEDURE — 94640 AIRWAY INHALATION TREATMENT: CPT

## 2025-07-13 PROCEDURE — 84145 PROCALCITONIN (PCT): CPT

## 2025-07-13 PROCEDURE — 85027 COMPLETE CBC AUTOMATED: CPT

## 2025-07-13 PROCEDURE — 250N000011 HC RX IP 250 OP 636

## 2025-07-13 PROCEDURE — 250N000013 HC RX MED GY IP 250 OP 250 PS 637

## 2025-07-13 PROCEDURE — 80048 BASIC METABOLIC PNL TOTAL CA: CPT

## 2025-07-13 PROCEDURE — 71046 X-RAY EXAM CHEST 2 VIEWS: CPT

## 2025-07-13 PROCEDURE — 999N000157 HC STATISTIC RCP TIME EA 10 MIN

## 2025-07-13 PROCEDURE — 250N000009 HC RX 250

## 2025-07-13 PROCEDURE — 250N000012 HC RX MED GY IP 250 OP 636 PS 637: Performed by: NURSE PRACTITIONER

## 2025-07-13 PROCEDURE — 83735 ASSAY OF MAGNESIUM: CPT

## 2025-07-13 PROCEDURE — 99233 SBSQ HOSP IP/OBS HIGH 50: CPT | Mod: FS | Performed by: PHYSICIAN ASSISTANT

## 2025-07-13 PROCEDURE — 999N000215 HC STATISTIC HFNC ADULT NON-CPAP

## 2025-07-13 PROCEDURE — 250N000013 HC RX MED GY IP 250 OP 250 PS 637: Performed by: STUDENT IN AN ORGANIZED HEALTH CARE EDUCATION/TRAINING PROGRAM

## 2025-07-13 PROCEDURE — 36415 COLL VENOUS BLD VENIPUNCTURE: CPT

## 2025-07-13 PROCEDURE — 94640 AIRWAY INHALATION TREATMENT: CPT | Mod: 76

## 2025-07-13 PROCEDURE — 120N000011 HC R&B TRANSPLANT UMMC

## 2025-07-13 PROCEDURE — 83880 ASSAY OF NATRIURETIC PEPTIDE: CPT

## 2025-07-13 PROCEDURE — 99232 SBSQ HOSP IP/OBS MODERATE 35: CPT | Mod: GC | Performed by: STUDENT IN AN ORGANIZED HEALTH CARE EDUCATION/TRAINING PROGRAM

## 2025-07-13 RX ORDER — CEFTRIAXONE 2 G/1
2 INJECTION, POWDER, FOR SOLUTION INTRAMUSCULAR; INTRAVENOUS EVERY 24 HOURS
Status: DISCONTINUED | OUTPATIENT
Start: 2025-07-13 | End: 2025-07-13

## 2025-07-13 RX ORDER — DOXYCYCLINE 100 MG/1
100 CAPSULE ORAL EVERY 12 HOURS SCHEDULED
Status: DISCONTINUED | OUTPATIENT
Start: 2025-07-13 | End: 2025-07-13

## 2025-07-13 RX ORDER — DOXYCYCLINE 100 MG/1
100 CAPSULE ORAL EVERY 12 HOURS SCHEDULED
Status: COMPLETED | OUTPATIENT
Start: 2025-07-13 | End: 2025-07-15

## 2025-07-13 RX ORDER — FUROSEMIDE 10 MG/ML
80 INJECTION INTRAMUSCULAR; INTRAVENOUS ONCE
Status: COMPLETED | OUTPATIENT
Start: 2025-07-13 | End: 2025-07-13

## 2025-07-13 RX ADMIN — DOXYCYCLINE HYCLATE 100 MG: 100 CAPSULE ORAL at 20:18

## 2025-07-13 RX ADMIN — PANTOPRAZOLE SODIUM 40 MG: 40 TABLET, DELAYED RELEASE ORAL at 08:45

## 2025-07-13 RX ADMIN — CEFTRIAXONE SODIUM 2 G: 2 INJECTION, POWDER, FOR SOLUTION INTRAMUSCULAR; INTRAVENOUS at 12:09

## 2025-07-13 RX ADMIN — ENOXAPARIN SODIUM 40 MG: 40 INJECTION SUBCUTANEOUS at 16:32

## 2025-07-13 RX ADMIN — ISOSORBIDE MONONITRATE 60 MG: 60 TABLET, EXTENDED RELEASE ORAL at 08:45

## 2025-07-13 RX ADMIN — Medication 1 TABLET: at 12:11

## 2025-07-13 RX ADMIN — PREDNISONE 40 MG: 20 TABLET ORAL at 08:45

## 2025-07-13 RX ADMIN — IPRATROPIUM BROMIDE AND ALBUTEROL SULFATE 3 ML: .5; 3 SOLUTION RESPIRATORY (INHALATION) at 21:06

## 2025-07-13 RX ADMIN — GUAIFENESIN 200 MG: 200 SOLUTION ORAL at 05:14

## 2025-07-13 RX ADMIN — MYCOPHENOLATE MOFETIL 500 MG: 250 CAPSULE ORAL at 08:45

## 2025-07-13 RX ADMIN — ACETAMINOPHEN 650 MG: 325 TABLET ORAL at 12:14

## 2025-07-13 RX ADMIN — MYCOPHENOLATE MOFETIL 500 MG: 250 CAPSULE ORAL at 17:57

## 2025-07-13 RX ADMIN — GUAIFENESIN 200 MG: 200 SOLUTION ORAL at 22:00

## 2025-07-13 RX ADMIN — DOXYCYCLINE HYCLATE 100 MG: 100 CAPSULE ORAL at 08:45

## 2025-07-13 RX ADMIN — Medication 12.5 MG: at 20:18

## 2025-07-13 RX ADMIN — GUAIFENESIN 200 MG: 200 SOLUTION ORAL at 00:28

## 2025-07-13 RX ADMIN — IPRATROPIUM BROMIDE AND ALBUTEROL SULFATE 3 ML: .5; 3 SOLUTION RESPIRATORY (INHALATION) at 09:19

## 2025-07-13 RX ADMIN — ACETAMINOPHEN 650 MG: 325 TABLET ORAL at 21:16

## 2025-07-13 RX ADMIN — NALTREXONE HYDROCHLORIDE 50 MG: 50 TABLET, FILM COATED ORAL at 08:45

## 2025-07-13 RX ADMIN — ROSUVASTATIN CALCIUM 20 MG: 20 TABLET, FILM COATED ORAL at 08:45

## 2025-07-13 RX ADMIN — POLYETHYLENE GLYCOL 3350 17 G: 17 POWDER, FOR SOLUTION ORAL at 08:45

## 2025-07-13 RX ADMIN — Medication 5 MG: at 21:16

## 2025-07-13 RX ADMIN — IPRATROPIUM BROMIDE AND ALBUTEROL SULFATE 3 ML: .5; 3 SOLUTION RESPIRATORY (INHALATION) at 12:04

## 2025-07-13 RX ADMIN — IPRATROPIUM BROMIDE AND ALBUTEROL SULFATE 3 ML: .5; 3 SOLUTION RESPIRATORY (INHALATION) at 16:15

## 2025-07-13 RX ADMIN — GUAIFENESIN 200 MG: 200 SOLUTION ORAL at 17:58

## 2025-07-13 RX ADMIN — GUAIFENESIN 200 MG: 200 SOLUTION ORAL at 12:14

## 2025-07-13 RX ADMIN — ASPIRIN 81 MG CHEWABLE TABLET 81 MG: 81 TABLET CHEWABLE at 08:45

## 2025-07-13 RX ADMIN — FUROSEMIDE 80 MG: 10 INJECTION, SOLUTION INTRAMUSCULAR; INTRAVENOUS at 13:34

## 2025-07-13 RX ADMIN — FLUOXETINE HYDROCHLORIDE 60 MG: 40 CAPSULE ORAL at 08:45

## 2025-07-13 RX ADMIN — ENTECAVIR 0.5 MG: 0.5 TABLET ORAL at 08:45

## 2025-07-13 RX ADMIN — ACETAMINOPHEN 650 MG: 325 TABLET ORAL at 05:14

## 2025-07-13 ASSESSMENT — ACTIVITIES OF DAILY LIVING (ADL)
ADLS_ACUITY_SCORE: 44
DIFFICULTY_EATING/SWALLOWING: NO
WALKING_OR_CLIMBING_STAIRS_DIFFICULTY: YES
ADLS_ACUITY_SCORE: 46
ADLS_ACUITY_SCORE: 47
ADLS_ACUITY_SCORE: 44
ADLS_ACUITY_SCORE: 47
ADLS_ACUITY_SCORE: 44
WEAR_GLASSES_OR_BLIND: YES
CONCENTRATING,_REMEMBERING_OR_MAKING_DECISIONS_DIFFICULTY: NO
DRESSING/BATHING_DIFFICULTY: YES
DOING_ERRANDS_INDEPENDENTLY_DIFFICULTY: NO
FALL_HISTORY_WITHIN_LAST_SIX_MONTHS: NO
ADLS_ACUITY_SCORE: 44
ADLS_ACUITY_SCORE: 46
ADLS_ACUITY_SCORE: 44
CHANGE_IN_FUNCTIONAL_STATUS_SINCE_ONSET_OF_CURRENT_ILLNESS/INJURY: NO
VISION_MANAGEMENT: GLASSES
ADLS_ACUITY_SCORE: 46
ADLS_ACUITY_SCORE: 47
ADLS_ACUITY_SCORE: 47
HEARING_DIFFICULTY_OR_DEAF: NO
ADLS_ACUITY_SCORE: 44
ADLS_ACUITY_SCORE: 48
ADLS_ACUITY_SCORE: 44
ADLS_ACUITY_SCORE: 47
TOILETING_ISSUES: NO
ADLS_ACUITY_SCORE: 46
ADLS_ACUITY_SCORE: 47
ADLS_ACUITY_SCORE: 44
DIFFICULTY_COMMUNICATING: NO
ADLS_ACUITY_SCORE: 44
WALKING_OR_CLIMBING_STAIRS: OTHER (SEE COMMENTS)
ADLS_ACUITY_SCORE: 44
DRESSING/BATHING: BATHING DIFFICULTY, REQUIRES EQUIPMENT

## 2025-07-13 NOTE — PROVIDER NOTIFICATION
FYI patient having new bradycardia (HR in 30s) and also has increased respirations around 40ish. RT coming to assess, thanks.    RT put pt on bipap, team assessed at bedside.

## 2025-07-13 NOTE — PLAN OF CARE
Goal Outcome Evaluation:      Plan of Care Reviewed With: patient    Overall Patient Progress: no change    Outcome Evaluation: on high flow overnight    /72 (BP Location: Left arm)   Pulse 61   Temp 98.2  F (36.8  C) (Oral)   Resp 20   SpO2 (!) 91%     Shift: 0637-0767  Isolation Status: Droplet   VS: VSS on RA, afebrile  Neuro: Aox4  Behaviors: calm, cooperative, & pleasant   BG:  N/A   Labs: No morning labs ordered  Respiratory: High flow: 40L/min, 50% O2   Cardiac: WDL   Pain/Nausea: c/o abdominal discomfort from coughing   PRN: tylenol, robitussin x2  Diet: Low sat fat, no caffeine, NA less than 2400 mg   IV Access: R PIV   Infusion(s): SL  GI/: Voids spontaneously. LBM 7/12  Skin: scattered atypical nevi   Mobility: SBA   Plan: Continue with POC

## 2025-07-13 NOTE — PROGRESS NOTES
[facility]    Progress Note - Medicine Service, [facility] TEAM 2       Date of Admission:  7/9/2025    Assessment & Plan   65 year old male with PMHx of NSTEMI, CAD s/p CABG (2020), possible COPD, on chronic immunosuppression s/p renal transplant x2 presenting with worsening dyspnea and cough found to have positive resp PCR panel for Human Rhin/Enterovirus with bilateral ground glass opacities c/f possible viral vs atypical pneumonia, admitted for AHRF secondary to undifferentiated pneumonia. Initially, treated for possible CAP and COPD exacerbation with IV antibiotics, steroids and duonebs. Now with increased pulmonary edema on CXR and elevated BNP, consistent with acute decompensated heart failure, being treated with IV diuresis.     Changes Today:  -repeat CXR with likely increased pulmonary edema as well as elevated BNP to 1800.  -started on IV diuresis with close electrolyte monitoring  -repeat ECHO ordered  -repeat pro-calcitonin   -CBC and BMP every other day  -sputum induction with RT  -IV ceftriaxone and PO doxycycline for a total of 5 day course.     #AHRF  #Acute decompensated heart failure  #C/f viral vs atypical pneumonia  Patient presenting with worsening dyspnea at rest and nonproductive cough for past 3 days and found to be hypoxemic on arrival with significant respiratory distress ultimately needing Bipap for respiratory support. Workup significant for mild leukocytosis and elevated inflammatory markers with imaging showing ground glass opacities on CT. RPP positive for rhinovirus/enterovirus. Strep pneumo and legionella urine antigen negative. Initially, suspected clinical presentation was secondary viral or atypical pneumonia. Although would be atypical for rhino/enterovirus to be causing this significant of a lower respiratory tract infection so continued with more broad workup. Reassuringly CT PE negative for PE. Infectious work up thus far has been  unrevealing. Patient treated with an empiric 5 day course of IV antibiotics for CAP coverage and atypical causes with ceftriaxone and doxycycline, without significant improvement of symptoms or oxygen requirements. Blood cultures initially came back positive for MRSE, likely contamination, repeat cultures no growth to date. There is increasing concern that current oxygen requirements are out of proportion to a typical CAP or COPD exacerbation. Ongoing acute hypoxia and increased work of breathing either secondary to an infectious etiology that was not covered under previous antibiotic regimen vs a potential cardiogenic cause. CXR with increased pulmonary edema. BNP elevated to 1800. Started on IV diuresis.     Plan:  -CXR with increased pulmonary edema.  -Pro-BNP elevated at 1889.  -IV diuresis with close monitoring of electrolytes. Strict I&Os.  -Continue and complete IV Ceftriaxone (7/9-7/13) and PO Doxycycline (7/11-7/15*) course.  -Continue duonebs q4h while awake  -Solumedrol 125mg IV 7/9, prednisone 40mg PO daily (7/10 - ). Will determine length of steroids on clinical progress.   -Pulm consult  Continue doxycycline and ceftriaxone (azithromycin stopped due to prolonged Qtc)  Duonebs q4h while awake (ordered)  Continue breo (ordered)  Methylpred 125 mg IV x1, prednisone 40 mg (7/10 - )  Does not warrant bronchoscopy at the moment  -Work up thus far:   Respiratory viral panel (positive for rhino/entero)  COVID, Influenza (negative)  Sputum culture 7/9 contaminated; will try for repeat collection 7/12   Urine histo and blasto ab, ag (negative)  Urine strep and legionella (negative)  B d glucan and aspergillosis (negative)  Procalcitonin 0.16  Repeat procalcitonin 0.06  MRSA swab (negative)    #Acute decompensated heart failure  #HFpEF (LVEF 60-65%)  #Hx of CAD s/p CABG  #Hx NSTEMI  NSTEMI in 2014. Most recent ECHO from 8/2023 with LVEF of 60-65%. Concern for acute decompensated heart failure given likely  pulmonary edema seen on CXR and increased BNP. Initiated IV diuresis.     Plan:  -IV lasix 80mg IV x 1  -Goal of net -2L urine output/day  -Monitor BMP and Mg.   -Holding PTA lasix 20mg daily while treating infection  -PTA Aspirin 81 mg daily  -Holding Nitroglycerin 0.4mg due to patient not taking                #COPD exacerbation   Reviewed PFTs from 4/2018 showed moderate restriction with FVC 63%. Reports a 5 pack year smoking history. Unclear if patient has obstructive lung disease. Diagnosis of COPD appears to come from inaccurate health form filled out by patient years ago. Ideally get updated PFTs and DCLO testing. Interestingly he has had improvement with Bipap, but did not have evidence of CO2 retention on gases suggesting less obstructive pathology.      Plan:  -steroids, antibiotics as above   -Duonebs q4h while awake  -Will continue PTA COPD inhalers for now  -PTA Breo Ellipta 1 puff/day  -PTA albuterol PRN     #Hx of renal transplant   #chronic immunosuppression  Hx of renal failure on dialysis at age 16, and kidney transplant, infected with hepatitis B from dialysis.      Plan:  Reduce PTA Mycophenolate 500mg BID (decreased dose for 5 days starting 7/10-7/14*, per nephrology)  Holding PTA Prednisone 5mg daily while on higher dose      #Lactic acidosis, resolved    Likely secondary to severe infection in setting of AHRF. LA on admission 3.3 --> 2.5 --> 1.4.      #Elevated troponins  #Type 2 demand ischemia   Likely secondary to demand ischemia in setting of severe infection and respiratory failure. Without chest pain or signs of fluid overload on exam. EKG without acute ischemic changes. POC ECHO without pericardial effusion and normal appearing ejection fraction.    Plan:  -Formal ECHO ordered    #HTN     Plan:  PTA Metoprolol succinate 12.5mg daily  PTA Imdur 60 mg daily       #HLD     Plan:  -rosuvastatin 20mg daily     #Chronic hepatitis B     Plan:  -entecavir 0.5mg daily     #GERD    "  Plan:  -Pantoprazole 40mg daily     #KIM     Plan:  PTA Atarax 10-25mg PRN  PTA Fluoxetine 20mg daily  PTA Fluoxetine 40mg daily            Diet: Combination Diet Low Saturated Fat Na <2400mg Diet, No Caffeine Diet    DVT Prophylaxis: Enoxaparin (Lovenox) SQ  [place] Catheter: Not present  Fluids: none  Lines: None     Cardiac Monitoring: None  Code Status: No CPR- Do NOT Intubate      Clinically Significant Risk Factors               # Hypoalbuminemia: Lowest albumin = 3.1 g/dL at 7/11/2025  7:16 AM, will monitor as appropriate     # Hypertension: Noted on problem list            # Overweight: Estimated body mass index is 26.91 kg/m  as calculated from the following:    Height as of 6/30/25: 1.702 m (5' 7\").    Weight as of 6/30/25: 77.9 kg (171 lb 12.8 oz)., PRESENT ON ADMISSION            Social Drivers of Health   Tobacco Use: Medium Risk (6/30/2025)    Patient History     Smoking Tobacco Use: Former     Smokeless Tobacco Use: Former     Passive Exposure: Never   Physical Activity: Insufficiently Active (11/5/2024)    Exercise Vital Sign     Days of Exercise per Week: 5 days     Minutes of Exercise per Session: 20 min   Stress: Stress Concern Present (11/5/2024)    [place] [employer] - Occupational Stress Questionnaire     Feeling of Stress : To some extent   Social Connections: Unknown (11/5/2024)    Social Connection and Isolation Panel [NHANES]     Frequency of Social Gatherings with Friends and Family: More than three times a week         Disposition Plan     Medically Ready for Discharge: Anticipated in 2-4 Days       The patient's care was discussed with the Attending Physician, Dr. Plasencia.    Alona Whalen, DO  Medicine Service, [facility] TEAM [address] [place] [address]  Securely message with [email or web address] (more info)  Text page via [email or web address] Paging/Directory   See signed in provider for up to date coverage " information  ______________________________________________________________________    Interval History   No acute events overnight. This morning, Ander is feeling about the same. Endorses increased frequency of cough. Does not notice as much sputum today. Overall, feels like his cough is making it harder for him to breathe. Denies fever, chills, chest pain, abdominal pain, lower extremity swelling.     Physical Exam   Vital Signs: Temp: 98.5  F (36.9  C) Temp src: Oral BP: (!) 150/92 Pulse: 64   Resp: 20 SpO2: 92 % O2 Device: High Flow Nasal Cannula (HFNC) Oxygen Delivery: 40 LPM  Weight: 0 lbs 0 oz    .Physical Exam  Vitals reviewed.   HENT:      Head: Normocephalic and atraumatic.   Cardiovascular:      Rate and Rhythm: Normal rate and regular rhythm.   Pulmonary:      Effort: Tachypnea and respiratory distress present.      Breath sounds: Wheezing and rhonchi present.      Comments: Wheezing and rhonchi present bilaterally in lower lung fields.  Abdominal:      General: Abdomen is flat.      Palpations: Abdomen is soft.   Musculoskeletal:      Right lower leg: No edema.      Left lower leg: No edema.   Neurological:      Mental Status: He is alert.           Medical Decision Making       Please see A&P for additional details of medical decision making.      Data     I have personally reviewed the following data over the past 24 hrs:    11.6 (H)  \   12.7 (L)   / 265     135 98 14.2 /  110 (H)   3.8 26 0.64 (L) \     Trop: N/A BNP: 1,889 (H)     Procal: 0.06 CRP: N/A Lactic Acid: N/A         Imaging results reviewed over the past 24 hrs:   Recent Results (from the past 24 hours)   XR Chest 2 Views    Narrative    EXAM: XR CHEST 2 VIEWS  7/13/2025 9:59 AM      HISTORY: rule out pulmonary consolidation. Evaluate for pleurual  effusoin    COMPARISON: CT chest dated 7/9/2025.    FINDINGS: Two views of the chest. Postsurgical changes of the chest  with intact median sternotomy. Trachea is midline.  Stable  cardiomediastinal silhouette. Redemonstrated mixed perihilar and  bibasilar opacities. Increased mixed interstitial and airspace  opacities noted in the left mid to lower lung field. No significant  pleural effusion or pneumothorax. Stable osseous and soft tissue  structures.      Impression    IMPRESSION:   1.  Interval increased mixed interstitial and airspace opacities in  the left mid to lower lung field could represent edema, atelectasis  versus infection.  2.  Similar patchy perihilar opacities likely represent  edema/atelectasis.    JULIO WILLIS MD         SYSTEM ID:  [id number]

## 2025-07-13 NOTE — PLAN OF CARE
Goal Outcome Evaluation:    Plan of Care Reviewed With: patient  Overall Patient Progress: declining  Outcome Evaluation: switched to bipap today d/t high RR (30-40), OVSS; pt is resting comfortably    /79 (BP Location: Left arm)   Pulse 64   Temp 98.9  F (37.2  C) (Oral)   Resp (!) 40   SpO2 95%     Shift: [id number]  Isolation Status: droplet  VS: high RR on high flow (40 L/min 40% O2), afebrile; (patient is sometimes needing bipap)  Neuro: AOx4  Behaviors: receptive to cares, calling appropriately   BG: n/a  Labs/Imaging: chest xray done today, waiting on RT induced sputum collection; pt waiting on echo  Respiratory: tachypneic, team aware   Cardiac: WDL  Pain/Nausea: endorsing some back pain, denies nausea  PRNs: tylenol x1, robitussin x1  Diet: Low sat. fat, Na <2400mg, no caffeine diet w/ good appetite; strict I & O  LDA: PIV SL  Infusion(s): IV abx  GI/: voiding using urinal (lasix 80 mg given), LBM 7/13  Skin: WDL  Mobility: up SBA  Plan: ECHO to be completed, continue to monitor respiratory status, continue with POC. Pt did ask for  & consult was placed but no one showed up today - follow up on Monday to ensure someone comes to see him.     Handoff given to following RN.

## 2025-07-13 NOTE — PROGRESS NOTES
[facility]  Transplant Nephrology Progress Note  Date of Admission:  7/9/2025  Today's Date: 07/13/2025    Recommendations:   - Will await pending labs.  - No acute indications for dialysis.  - Would make no changes in immunosuppression.  - Appreciate Pulmonary input.    Assessment & Plan   # DDKT: Stable creatinine with last check. Good urine output.  No acute indications for dialysis.   - Baseline Creatinine: ~ 0.7-0.9   - Proteinuria: Normal (<0.2 grams)   - DSA Hx: Not checked recently due to time from transplant   - Last cPRA: unknown%   - BK Viremia: Not checked recently due to time from transplant   - Kidney Tx Biopsy Hx: No biopsy history.    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     # Immunosuppression Prior to Admission: Mycophenolate mofetil (dose 750 mg every 12 hours) and Prednisone (dose 5 mg daily)   - Present Immunosuppression: Mycophenolate mofetil (dose 500 mg every 12 hours) and Prednisone (dose 5 mg daily)    - Induction with Recent Transplant:  Not known due to time from transplant   - Continue with intensive monitoring of immunosuppression for efficacy and toxicity.   - Historical Changes in Immunosuppression: None   - Changes: Not at this time    # Infection Prevention:   Last CD4 Level: Not checked recently  - PJP: None      - CMV IgG Ab High Risk Discordance (D+/R-) at time of transplant: Unknown  Present CMV Serostatus: Unknown  - EBV IgG Ab High Risk Discordance (D+/R-) at time of transplant: Unknown  Present EBV Serostatus: Unknown    # Hypertension: Controlled;  Goal BP: < 140/90 (Hospitalization goal)   - Changes: Not at this time    # Anemia in Chronic Renal Disease: Hgb: Stable      CAROL ANN: No   - Iron studies: Not checked recently    # Mineral Bone Disorder:    - Calcium; level: Normal        On supplement: Yes  - Phosphorus; level: Normal        On supplement: No    # Electrolytes:  - Potassium; level: Normal        On  supplement: No  - Magnesium; level: Normal        On supplement: No  - Bicarbonate; level: Normal        On supplement: No    # AHRF   Viral versus Atypical Pneumonia:  + Rhino/enterovirus. CT PE negative. Gram + Bcx on 07/09, likely contaminant. Repeat blood cultures 07/10. Empiric abx, nebs, solumedrol and now on PO prednisone 40 mg daily. Sputum culture 7/12 likely contaminated, per report.     # Other Significant PMH:   - CAD, s/p CABG: Last cardiac stress test was abnormal in 6/2023 (but unchanged from prior testing in 2022).  Coronary angiogram 9/2021 that showed some possible obstructive disease in the 1st diagonal, but unable to stent it.  Bypass grafts were open.  Plan is for medical management. Last cardiac echo (8/2023) showed LVEF ~ 60-65%. Normal right ventricular systolic function. Normal diastolic dysfunction.    - Provoked PE (8/2023): after right hip revision surgery. Completed AC.   - Chronic Hepatitis B: Stable on entecavir. Follows here with hepatology.   - Asthma: Some increase in shortness of breath, but felt more cardiac in origin.  Patient is stable on inhalers.   - GERD: Asymptomatic on pantoprazole, but with h/o ulcer, will continue on medication.   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  Seen and discussed with Dr. Lovett.     Sabrina Peña PAJamesC  Transplant Nephrology    Physician Attestation     I saw and evaluated Jerad Ross as part of a shared APRN/PA visit.     I personally reviewed the vital signs, medications, and labs.    I personally provided a substantive portion of care for this patient and I approve the care plan as written by the ALISHA.  I  was involved with Medical Decision Making including: Please see A&P for additional details of medical decision making.  MANAGEMENT DISCUSSED with the following over the past 24 hours: No acute indications for dialysis.  Would continue on present immunosuppression.  Appreciate Pulmonary input.     Nolan Lovett MD  Date of Service (when I saw the patient): 07/13/25      Interval History  Mr. Presley creatinine is 0.66 (07/11 0716); Stable.  Good urine output.  Other significant labs/tests/vitals: repeat CBC and CMP pending, last labs from 7/11  No events overnight.  No chest pain but ongoing shortness of breath, still requiring high flow oxygen  No leg swelling.  No nausea and vomiting.  No fever, sweats or chills.    Review of Systems   4 point ROS was obtained and negative except as noted in the Interval History.    MEDICATIONS:  Current Facility-Administered Medications   Medication Dose Route Frequency Provider Last Rate Last Admin    aspirin (ASA) chewable tablet 81 mg  81 mg Oral Daily Whalen, Alona, DO   81 mg at 07/13/25 0845    calcium citrate-vitamin D (CITRACAL) 315-6.25 MG-MCG per tablet 1 tablet  1 tablet Oral Daily with lunch Blake Plasencia MD   1 tablet at 07/12/25 1200    cefTRIAXone (ROCEPHIN) 2 g vial to attach to  ml bag for ADULTS or NS 50 ml bag for PEDS  2 g Intravenous Q24H Whalen, Alona, DO   2 g at 07/12/25 1207    doxycycline hyclate (VIBRAMYCIN) capsule 100 mg  100 mg Oral Q12H [facility] (08/20) Whalen, Alona, DO   100 mg at 07/13/25 0845    enoxaparin ANTICOAGULANT (LOVENOX) injection 40 mg  40 mg Subcutaneous Q24H Whalen, Alona, DO   40 mg at 07/12/25 1653    entecavir (BARACLUDE) tablet 0.5 mg  0.5 mg Oral Daily Whalen, Alona, DO   0.5 mg at 07/13/25 0845    FLUoxetine (PROzac) capsule 60 mg  60 mg Oral Daily Blake Plasencia MD   60 mg at 07/13/25 0845    fluticasone-vilanterol (BREO ELLIPTA) 100-25 MCG/ACT inhaler 1 puff  1 puff Inhalation Daily Whalen, Alona, DO        ipratropium - albuterol  0.5 mg/2.5 mg (3mg)/3 mL (DUONEB) neb solution 3 mL  3 mL Nebulization Q4H WA Whalen, Alona, DO   3 mL at 25 0919    isosorbide mononitrate (IMDUR) 24 hr tablet 60 mg  60 mg Oral Daily Whalen, Alona, DO   60 mg at 25 0845    metoprolol succinate ER (TOPROL-XL) 24 hr half-tab 12.5 mg  12.5 mg Oral Daily Whalen, Alona, DO   12.5 mg at 25    multivitamin w/minerals (THERA-VIT-M) tablet 1 tablet  1 tablet Oral Daily with lunch Blake Plasencia MD   1 tablet at 25 1200    mycophenolate (GENERIC EQUIVALENT) capsule 500 mg  500 mg Oral BID IS Lisa Levine APRN CNP   500 mg at 25 0845    naltrexone (DEPADE/REVIA) tablet 50 mg  50 mg Oral Daily Whalen, Alona, DO   50 mg at 25 0845    pantoprazole (PROTONIX) EC tablet 40 mg  40 mg Oral Daily Whalen, Alona, DO   40 mg at 25 0845    polyethylene glycol (MIRALAX) Packet 17 g  17 g Oral Daily KrumNeo mckeonor, DO   17 g at 25 0845    predniSONE (DELTASONE) tablet 40 mg  40 mg Oral Daily Blake Plasencia MD   40 mg at 25 0845    [Held by provider] predniSONE (DELTASONE) tablet 5 mg  5 mg Oral Daily Whalen, Alona, DO        psyllium (METAMUCIL/KONSYL) Packet 1 packet  1 packet Oral Daily Blake Plasencia MD   1 packet at 25 0911    rosuvastatin (CRESTOR) tablet 20 mg  20 mg Oral Daily Whalen, Alona, DO   20 mg at 25 0845    sodium chloride (PF) 0.9% PF flush 3 mL  3 mL Intracatheter Q8H NE Whalen, Alona, DO   3 mL at 25 1301     Current Facility-Administered Medications   Medication Dose Route Frequency Provider Last Rate Last Admin       Physical Exam   Temp  Av.3  F (36.8  C)  Min: 97.8  F (36.6  C)  Max: 98.8  F (37.1  C)      Pulse  Av  Min: 63  Max: 80 Resp  Av.9  Min: 18  Max: 44  FiO2 (%)  Av.2 %  Min: 50 %  Max: 65 %  SpO2  Av.2 %  Min: 90 %  Max: 99 %     BP (!) 150/92 (BP Location: Left arm)   Pulse 64   Temp 98.5  F (36.9  C) (Oral)   Resp 20   SpO2 92%     Admit       GENERAL APPEARANCE:  alert and no distress  HENT: mouth without ulcers or lesions; O2 via HFNC  RESP: lungs clear to auscultation - no rales, rhonchi or wheezes  CV: regular rhythm, normal rate, no rub, no murmur  EDEMA: no LE edema bilaterally  ABDOMEN: soft, nondistended, nontender  MS: extremities normal - no gross deformities noted, no evidence of inflammation in joints, no muscle tenderness  SKIN: no rash, numerous keratoses  TX KIDNEY: normal  DIALYSIS ACCESS: LUE AV Fistula (No thrill)    Data   All labs reviewed by me.  CMP  Recent Labs   Lab 07/11/25  0716 07/10/25  0936 07/09/25  1038    137 140   POTASSIUM 3.8 3.9 3.4   CHLORIDE 103 102 104   CO2 24 19* 22   ANIONGAP 10 16* 14   * 103* 85   BUN 15.0 14.9 12.4   CR 0.66* 0.68 0.72   GFRESTIMATED >90 >90 >90   HEMA 9.2 8.9 9.3   MAG 2.1 1.9  --    PHOS 2.5 3.2  --    PROTTOTAL  --   --  6.6   ALBUMIN 3.1* 3.2* 3.5   BILITOTAL  --   --  1.0   ALKPHOS  --   --  82   AST  --   --  49*   ALT  --   --  23     CBC  Recent Labs   Lab 07/11/25  0716 07/10/25  0936 07/09/25  1038   HGB 12.1* 13.1* 14.0   WBC 12.3* 8.7 12.0*   RBC 4.30* 4.55 4.91   HCT 37.7* 41.3 44.0   MCV 88 91 90   MCH 28.1 28.8 28.5   MCHC 32.1 31.7 31.8   RDW 15.9* 16.0* 15.9*    252 275     INR  Recent Labs   Lab 07/09/25  1038   INR 1.03     ABG  Recent Labs   Lab 07/09/25  1647 07/09/25  1617 07/09/25  1038   PH  --  7.36  --    PCO2  --  39  --    PO2  --  74*  --    HCO3  --  22  --    O2PER 15 4 21      Urine Studies  Recent Labs   Lab Test 07/09/25  1346 08/30/23  1856 08/20/23  1245 08/11/23  1928 06/03/20  1307   COLOR Yellow Yellow Light Yellow Light Yellow Yellow   APPEARANCE Slightly Cloudy* Clear Clear Clear Clear   URINEGLC Negative Negative Negative Negative Negative   URINEBILI Negative Negative Negative Negative Negative   URINEKETONE Trace* 10* 60* 10* Negative   SG 1.015 1.021 1.015 1.009 1.008   UBLD Negative Negative Negative Negative Negative   URINEPH 6.0 5.0 6.0 6.0 6.5    PROTEIN 20* 10* 20* 10* Negative   UROBILINOGEN  --   --   --   --  <2.0 E.U./dL   NITRITE Negative Negative Negative Negative Negative   LEUKEST Negative Negative Negative Negative Negative   RBCU 1 <1 <1 0  --    WBCU 7* 3 1 3  --      Recent Labs   Lab Test 10/28/20  0902 10/01/19  0942 04/02/19  0917 10/23/18  1122 06/21/18  1209   [facility] 0.23* 0.26* 0.23* 0.21* 0.12     PTH  No lab results found.  Iron Studies  Recent Labs   Lab Test 10/02/23  0943   IRON 30*      IRONSAT 10*   LA 50       IMAGING:  All imaging studies reviewed by me.

## 2025-07-14 ENCOUNTER — APPOINTMENT (OUTPATIENT)
Dept: PHYSICAL THERAPY | Facility: CLINIC | Age: 65
DRG: 193 | End: 2025-07-14
Payer: COMMERCIAL

## 2025-07-14 LAB
ALBUMIN SERPL BCG-MCNC: 3.2 G/DL (ref 3.5–5.2)
ALBUMIN SERPL BCG-MCNC: 3.2 G/DL (ref 3.5–5.2)
ANION GAP SERPL CALCULATED.3IONS-SCNC: 12 MMOL/L (ref 7–15)
ANION GAP SERPL CALCULATED.3IONS-SCNC: 13 MMOL/L (ref 7–15)
B DERMAT AB SER QL ID: NOT DETECTED
BACTERIA SPEC CULT: NO GROWTH
BACTERIA SPT CULT: ABNORMAL
BUN SERPL-MCNC: 20.4 MG/DL (ref 8–23)
BUN SERPL-MCNC: 20.9 MG/DL (ref 8–23)
CALCIUM SERPL-MCNC: 9.7 MG/DL (ref 8.8–10.4)
CALCIUM SERPL-MCNC: 9.8 MG/DL (ref 8.8–10.4)
CHLORIDE SERPL-SCNC: 95 MMOL/L (ref 98–107)
CHLORIDE SERPL-SCNC: 96 MMOL/L (ref 98–107)
CREAT SERPL-MCNC: 0.7 MG/DL (ref 0.67–1.17)
CREAT SERPL-MCNC: 0.72 MG/DL (ref 0.67–1.17)
CRP SERPL-MCNC: 135 MG/L
EGFRCR SERPLBLD CKD-EPI 2021: >90 ML/MIN/1.73M2
EGFRCR SERPLBLD CKD-EPI 2021: >90 ML/MIN/1.73M2
GLUCOSE SERPL-MCNC: 226 MG/DL (ref 70–99)
GLUCOSE SERPL-MCNC: 86 MG/DL (ref 70–99)
GRAM STAIN RESULT: ABNORMAL
HCO3 SERPL-SCNC: 26 MMOL/L (ref 22–29)
HCO3 SERPL-SCNC: 28 MMOL/L (ref 22–29)
LVEF ECHO: NORMAL
MAGNESIUM SERPL-MCNC: 1.8 MG/DL (ref 1.7–2.3)
PHOSPHATE SERPL-MCNC: 2.7 MG/DL (ref 2.5–4.5)
PHOSPHATE SERPL-MCNC: 3.3 MG/DL (ref 2.5–4.5)
POTASSIUM SERPL-SCNC: 3.3 MMOL/L (ref 3.4–5.3)
POTASSIUM SERPL-SCNC: 4 MMOL/L (ref 3.4–5.3)
SCANNED LAB RESULT: NORMAL
SCANNED LAB RESULT: NORMAL
SODIUM SERPL-SCNC: 135 MMOL/L (ref 135–145)
SODIUM SERPL-SCNC: 135 MMOL/L (ref 135–145)

## 2025-07-14 PROCEDURE — 250N000009 HC RX 250

## 2025-07-14 PROCEDURE — 999N000157 HC STATISTIC RCP TIME EA 10 MIN

## 2025-07-14 PROCEDURE — 250N000011 HC RX IP 250 OP 636: Performed by: STUDENT IN AN ORGANIZED HEALTH CARE EDUCATION/TRAINING PROGRAM

## 2025-07-14 PROCEDURE — 250N000013 HC RX MED GY IP 250 OP 250 PS 637

## 2025-07-14 PROCEDURE — 94640 AIRWAY INHALATION TREATMENT: CPT | Mod: 76

## 2025-07-14 PROCEDURE — 94660 CPAP INITIATION&MGMT: CPT

## 2025-07-14 PROCEDURE — 250N000012 HC RX MED GY IP 250 OP 636 PS 637: Performed by: STUDENT IN AN ORGANIZED HEALTH CARE EDUCATION/TRAINING PROGRAM

## 2025-07-14 PROCEDURE — 250N000012 HC RX MED GY IP 250 OP 636 PS 637: Performed by: NURSE PRACTITIONER

## 2025-07-14 PROCEDURE — 99232 SBSQ HOSP IP/OBS MODERATE 35: CPT | Mod: GC | Performed by: STUDENT IN AN ORGANIZED HEALTH CARE EDUCATION/TRAINING PROGRAM

## 2025-07-14 PROCEDURE — 36415 COLL VENOUS BLD VENIPUNCTURE: CPT

## 2025-07-14 PROCEDURE — 99233 SBSQ HOSP IP/OBS HIGH 50: CPT | Mod: GC | Performed by: INTERNAL MEDICINE

## 2025-07-14 PROCEDURE — 99232 SBSQ HOSP IP/OBS MODERATE 35: CPT | Performed by: NURSE PRACTITIONER

## 2025-07-14 PROCEDURE — 83735 ASSAY OF MAGNESIUM: CPT

## 2025-07-14 PROCEDURE — 250N000013 HC RX MED GY IP 250 OP 250 PS 637: Performed by: STUDENT IN AN ORGANIZED HEALTH CARE EDUCATION/TRAINING PROGRAM

## 2025-07-14 PROCEDURE — 250N000011 HC RX IP 250 OP 636

## 2025-07-14 PROCEDURE — 97530 THERAPEUTIC ACTIVITIES: CPT | Mod: GP

## 2025-07-14 PROCEDURE — 87070 CULTURE OTHR SPECIMN AEROBIC: CPT | Performed by: STUDENT IN AN ORGANIZED HEALTH CARE EDUCATION/TRAINING PROGRAM

## 2025-07-14 PROCEDURE — 82435 ASSAY OF BLOOD CHLORIDE: CPT

## 2025-07-14 PROCEDURE — 120N000011 HC R&B TRANSPLANT UMMC

## 2025-07-14 PROCEDURE — 86140 C-REACTIVE PROTEIN: CPT | Performed by: STUDENT IN AN ORGANIZED HEALTH CARE EDUCATION/TRAINING PROGRAM

## 2025-07-14 PROCEDURE — 97116 GAIT TRAINING THERAPY: CPT | Mod: GP

## 2025-07-14 PROCEDURE — 94640 AIRWAY INHALATION TREATMENT: CPT

## 2025-07-14 RX ORDER — FUROSEMIDE 10 MG/ML
20 INJECTION INTRAMUSCULAR; INTRAVENOUS ONCE
Status: COMPLETED | OUTPATIENT
Start: 2025-07-14 | End: 2025-07-14

## 2025-07-14 RX ORDER — MAGNESIUM OXIDE 400 MG/1
400 TABLET ORAL EVERY 4 HOURS
Status: COMPLETED | OUTPATIENT
Start: 2025-07-14 | End: 2025-07-14

## 2025-07-14 RX ORDER — NYSTATIN 100000 [USP'U]/ML
500000 SUSPENSION ORAL 4 TIMES DAILY
Status: DISCONTINUED | OUTPATIENT
Start: 2025-07-14 | End: 2025-07-21 | Stop reason: HOSPADM

## 2025-07-14 RX ORDER — FUROSEMIDE 10 MG/ML
20 INJECTION INTRAMUSCULAR; INTRAVENOUS ONCE
Status: DISCONTINUED | OUTPATIENT
Start: 2025-07-14 | End: 2025-07-14

## 2025-07-14 RX ORDER — POTASSIUM CHLORIDE 750 MG/1
20 TABLET, EXTENDED RELEASE ORAL 2 TIMES DAILY
Status: COMPLETED | OUTPATIENT
Start: 2025-07-14 | End: 2025-07-14

## 2025-07-14 RX ADMIN — FLUTICASONE FUROATE AND VILANTEROL TRIFENATATE 1 PUFF: 100; 25 POWDER RESPIRATORY (INHALATION) at 12:26

## 2025-07-14 RX ADMIN — NYSTATIN 500000 UNITS: 100000 SUSPENSION ORAL at 14:48

## 2025-07-14 RX ADMIN — POTASSIUM & SODIUM PHOSPHATES POWDER PACK 280-160-250 MG 1 PACKET: 280-160-250 PACK at 15:56

## 2025-07-14 RX ADMIN — POTASSIUM CHLORIDE 20 MEQ: 750 TABLET, EXTENDED RELEASE ORAL at 19:40

## 2025-07-14 RX ADMIN — ACETAMINOPHEN 650 MG: 325 TABLET ORAL at 12:29

## 2025-07-14 RX ADMIN — IPRATROPIUM BROMIDE AND ALBUTEROL SULFATE 3 ML: .5; 3 SOLUTION RESPIRATORY (INHALATION) at 20:32

## 2025-07-14 RX ADMIN — Medication 1 TABLET: at 12:26

## 2025-07-14 RX ADMIN — ACETAMINOPHEN 650 MG: 325 TABLET ORAL at 23:59

## 2025-07-14 RX ADMIN — Medication 12.5 MG: at 19:40

## 2025-07-14 RX ADMIN — POTASSIUM & SODIUM PHOSPHATES POWDER PACK 280-160-250 MG 1 PACKET: 280-160-250 PACK at 19:40

## 2025-07-14 RX ADMIN — MYCOPHENOLATE MOFETIL 500 MG: 250 CAPSULE ORAL at 18:31

## 2025-07-14 RX ADMIN — GUAIFENESIN 200 MG: 200 SOLUTION ORAL at 19:44

## 2025-07-14 RX ADMIN — NYSTATIN 500000 UNITS: 100000 SUSPENSION ORAL at 19:40

## 2025-07-14 RX ADMIN — ISOSORBIDE MONONITRATE 60 MG: 60 TABLET, EXTENDED RELEASE ORAL at 08:08

## 2025-07-14 RX ADMIN — PSYLLIUM HUSK 1 PACKET: 3.4 POWDER ORAL at 08:08

## 2025-07-14 RX ADMIN — ENTECAVIR 0.5 MG: 0.5 TABLET ORAL at 08:10

## 2025-07-14 RX ADMIN — ROSUVASTATIN CALCIUM 20 MG: 20 TABLET, FILM COATED ORAL at 08:08

## 2025-07-14 RX ADMIN — IPRATROPIUM BROMIDE AND ALBUTEROL SULFATE 3 ML: .5; 3 SOLUTION RESPIRATORY (INHALATION) at 16:02

## 2025-07-14 RX ADMIN — DOXYCYCLINE HYCLATE 100 MG: 100 CAPSULE ORAL at 19:40

## 2025-07-14 RX ADMIN — FUROSEMIDE 20 MG: 10 INJECTION, SOLUTION INTRAMUSCULAR; INTRAVENOUS at 14:53

## 2025-07-14 RX ADMIN — FLUOXETINE HYDROCHLORIDE 60 MG: 40 CAPSULE ORAL at 08:08

## 2025-07-14 RX ADMIN — IPRATROPIUM BROMIDE AND ALBUTEROL SULFATE 3 ML: .5; 3 SOLUTION RESPIRATORY (INHALATION) at 12:01

## 2025-07-14 RX ADMIN — MAGNESIUM OXIDE TAB 400 MG (241.3 MG ELEMENTAL MG) 400 MG: 400 (241.3 MG) TAB at 15:56

## 2025-07-14 RX ADMIN — ASPIRIN 81 MG CHEWABLE TABLET 81 MG: 81 TABLET CHEWABLE at 08:08

## 2025-07-14 RX ADMIN — PREDNISONE 40 MG: 20 TABLET ORAL at 08:08

## 2025-07-14 RX ADMIN — ACETAMINOPHEN 650 MG: 325 TABLET ORAL at 06:13

## 2025-07-14 RX ADMIN — NALTREXONE HYDROCHLORIDE 50 MG: 50 TABLET, FILM COATED ORAL at 08:09

## 2025-07-14 RX ADMIN — MYCOPHENOLATE MOFETIL 500 MG: 250 CAPSULE ORAL at 08:09

## 2025-07-14 RX ADMIN — GUAIFENESIN 200 MG: 200 SOLUTION ORAL at 02:01

## 2025-07-14 RX ADMIN — DOXYCYCLINE HYCLATE 100 MG: 100 CAPSULE ORAL at 08:08

## 2025-07-14 RX ADMIN — PANTOPRAZOLE SODIUM 40 MG: 40 TABLET, DELAYED RELEASE ORAL at 08:08

## 2025-07-14 RX ADMIN — ENOXAPARIN SODIUM 40 MG: 40 INJECTION SUBCUTANEOUS at 15:55

## 2025-07-14 RX ADMIN — IPRATROPIUM BROMIDE AND ALBUTEROL SULFATE 3 ML: .5; 3 SOLUTION RESPIRATORY (INHALATION) at 07:51

## 2025-07-14 RX ADMIN — FUROSEMIDE 20 MG: 10 INJECTION, SOLUTION INTRAMUSCULAR; INTRAVENOUS at 10:17

## 2025-07-14 RX ADMIN — Medication 5 MG: at 20:37

## 2025-07-14 RX ADMIN — MAGNESIUM OXIDE TAB 400 MG (241.3 MG ELEMENTAL MG) 400 MG: 400 (241.3 MG) TAB at 19:40

## 2025-07-14 RX ADMIN — GUAIFENESIN 200 MG: 200 SOLUTION ORAL at 10:22

## 2025-07-14 RX ADMIN — GUAIFENESIN 200 MG: 200 SOLUTION ORAL at 15:56

## 2025-07-14 RX ADMIN — GUAIFENESIN 200 MG: 200 SOLUTION ORAL at 06:18

## 2025-07-14 RX ADMIN — POTASSIUM CHLORIDE 20 MEQ: 750 TABLET, EXTENDED RELEASE ORAL at 08:28

## 2025-07-14 ASSESSMENT — ACTIVITIES OF DAILY LIVING (ADL)
ADLS_ACUITY_SCORE: 48
ADLS_ACUITY_SCORE: 47
ADLS_ACUITY_SCORE: 47
ADLS_ACUITY_SCORE: 48
ADLS_ACUITY_SCORE: 48
ADLS_ACUITY_SCORE: 47
ADLS_ACUITY_SCORE: 48
ADLS_ACUITY_SCORE: 48
ADLS_ACUITY_SCORE: 47
ADLS_ACUITY_SCORE: 48
ADLS_ACUITY_SCORE: 48
ADLS_ACUITY_SCORE: 47
ADLS_ACUITY_SCORE: 48
ADLS_ACUITY_SCORE: 47

## 2025-07-14 NOTE — PROGRESS NOTES
[facility]   Pulmonary Consult Note - Initial    Jerad Ross MRN: [id number]  Date of Admission:7/9/2025  Date of Service: 07/14/2025    Reason for consult: 66 yo male on chronic immunosuppression s/p renal transplant. Found to have b/l ground glass opacities on CT c/f ARDS vs atypical infection vs early diffuse aveolar hemorrhage vs organizing pnuemonia. Is bronch with BAL needed?   Primary service: [facility]      ASSESSMENT:     65 year old male with PMHx of NSTEMI, CAD s/p CABG (2020), possible asthma/COPD, on chronic immunosuppression s/p renal transplant x2 presenting with worsening dyspnea and cough found to have rhinovirus infection and bilateral ground glass opacities c/f possible viral vs atypical pneumonia, admitted for AHRF secondary to undifferentiated pneumonia and ACOS exacerbation. Pulmonary consulted for possible bronchoscopy.      #AHRF likely multifactorial 2/2 fluid overload, atelectasis and asthma exacerbation, overall improving  #Asthma-COPD overlap, improving  #Likely viral infection, now superimposed with bacterial pneumonia. Rhinovirus positive        RECOMMENDATIONS:      Complete course of antibiotics with doxycycline and ceftriaxone  Duonebs q4h  Continue breo  Continue prednisone 40 mg for a total of five day course  IS and flutter valve. Please encourage patient to use both.  Aggressive diuresis  Wean o2 as able, may try oxymask today  Does not warrant bronchoscopy at the moment  Sputum samples have been contaminated. May repeat sputum sample if possible    Pulmonary will continue to follow.    Patient seen and discussed with Dr. Perlman.    Gustavo Matos MD   Pulmonary/Critical Care Fellow             HPI/interval: Patient seen and examined, states he is feeling better, afebrile, laying comfortable in bed. Currently on HFNC with O2 sat ~92%, oxygen requirements coming down. Today he was able to do PT with oxymask at 15 L. Symptoms much improved.    ROS: As per  HPI    PMHx/PSHx:  Past Medical History:   Diagnosis Date    Acute midline low back pain without sciatica     AION (acute ischemic optic neuropathy)     Anemia in chronic renal disease     Anxiety     Arthritis 1978    Post transplant    Avascular necrosis of bones of both hips (H)     s/p bilateral hip replacements    Avascular necrosis of bones of both hips (H)     s/p bilateral hip replacements    Basal cell carcinoma     Cataract     Chronic hepatitis B (H)     Chronic osteoarthritis 1978    Post transplant    Clostridium difficile colitis     COPD (chronic obstructive pulmonary disease) (H)     Coronary artery disease involving native coronary artery of native heart without angina pectoris 06/17/2014    Coronary angiogram 6/17/14: Severe distal 3-vessel disease involving small vessels, not amenable to PCI or CABG.    CRP elevated     Depression     Depressive disorder 1978    Post transplant    Dialysis patient     Diverticulosis     Dyslipidemia     Elevated C-reactive protein (CRP)     FSGS (focal segmental glomerulosclerosis)     FSGS (focal segmental glomerulosclerosis)     Gastric ulcer with hemorrhage 02/12/2012    Gastric ulcer with hemorrhage 02/12/2012    GERD (gastroesophageal reflux disease)     Glaucoma     OHTN    Glaucoma     Hemorrhoids     History of blood transfusion     HTN (hypertension)     Hyperlipidemia 1978    Hypertension secondary to other renal disorders     Hypogonadism in male     Immunosuppressed status     Immunosuppression     Kidney replaced by transplant     focal glomerulosclerosis     Kidney transplanted     focal glomerulosclerosis     NSTEMI (non-ST elevated myocardial infarction) (H) 06/17/2014    NSTEMI (non-ST elevated myocardial infarction) (H) 06/17/2014    JULIANE (obstructive sleep apnea)     Doesn't use CPAP    Paracentral scotoma     LE    Renal failure     Secondary renal hyperparathyroidism     Septic bursitis     Squamous cell carcinoma     Uncomplicated asthma 2003     Well controled     Past Surgical History:   Procedure Laterality Date    APPENDECTOMY      ARTHROPLASTY REVISION HIP Right 06/19/2023    Procedure: RIGHT HIP REVISION;  Surgeon: Juan Mcdonough MD;  Location:  OR    BIOPSY      Cardiac Bypass surgery  06/08/2020    Fords's     CATARACT EXTRACTION EXTRACAPSULAR W/ INTRAOCULAR LENS IMPLANTATION Bilateral 4-20-10, 6-1-10    CATARACT IOL, RT/LT  04/19/2000    RE    CATARACT IOL, RT/LT  06/01/2000    LE    COLECTOMY PARTIAL  01/01/1983     10 cm, diverticulitis     COLECTOMY SUBTOTAL  1983    10 cm, diverticulitis    COLONOSCOPY  02/13/2012    Procedure:COLONOSCOPY; Surgeon:SLOAN GALLARDO; Location: GI    COLONOSCOPY N/A 01/22/2020    Procedure: Colonoscopy, With Polypectomy And Biopsy;  Surgeon: Aston Kiran MD;  Location:  GI    COLONOSCOPY N/A 06/05/2025    Procedure: Colonoscopy;  Surgeon: Lina Claros MD;  Location:  GI    CV CORONARY ANGIOGRAM N/A 06/02/2020    Procedure: Coronary Angiogram;  Surgeon: Alona Florentino MD;  Location: [facility] Cath Lab;  Service: Cardiology    CV CORONARY ANGIOGRAM N/A 09/20/2021    Procedure: Coronary Angiogram;  Surgeon: Adam Agudelo MD;  Location: [facility] CV    CV LEFT HEART CATHETERIZATION WITHOUT LEFT VENTRICULOGRAM Left 06/02/2020    Procedure: Left Heart Catheterization Without Left Ventriculogram;  Surgeon: Alona Florentino MD;  Location: [facility] Cath Lab;  Service: Cardiology    CV SUPRAVALVULAR AORTOGRAM N/A 09/20/2021    Procedure: Supravalvular Aortagram;  Surgeon: Adam Agudelo MD;  Location: [facility] CV    ESOPHAGOSCOPY, GASTROSCOPY, DUODENOSCOPY (EGD), COMBINED  02/13/2012    Procedure:COMBINED ESOPHAGOSCOPY, GASTROSCOPY, DUODENOSCOPY (EGD); Surgeon:SLOAN GALLARDO; Location: GI    ESOPHAGOSCOPY, GASTROSCOPY, DUODENOSCOPY (EGD), COMBINED  02/13/2012    EXTRACAPSULAR CATARACT EXTRATION WITH INTRAOCULAR LENS IMPLANT   4-20-10, 6-1-10    Rt, Lt    HERNIA REPAIR  1995    Lt inguinal    HERNIA REPAIR  1995    Lt inguinal    HIP SURGERY      1981, bilat MITZI, revised 2001, 2005    HIP SURGERY  01/01/1981    bilat MITZI, revised 2001, 2005    IR FINE NEEDLE ASPIRATION W ULTRASOUND  04/10/2023    JOINT REPLACEMENT      KIDNEY SURGERY  1978 and 1993    transplant    KNEE SURGERY  1983, 1987, 2001    bilat TKA    MOHS MICROGRAPHIC PROCEDURE      MOHS MICROGRAPHIC PROCEDURE      ORTHOPEDIC SURGERY      OTHER SURGICAL HISTORY      kidney [id number], 1993    PHACOEMULSIFICATION CLEAR CORNEA WITH STANDARD INTRAOCULAR LENS IMPLANT Right 04/19/2000    PHACOEMULSIFICATION CLEAR CORNEA WITH STANDARD INTRAOCULAR LENS IMPLANT Left 06/01/2000    NE BOWEL TO BOWEL ANASTOMOSIS      NE CARDIAC SURG PROCEDURE UNLISTED  2020    3x bypass    NE PELVIS/HIP JOINT SURGERY UNLISTED  1981, 1996, 2005, 2023    Both hips, L 1x rev. R 2x rev.    NE STOMACH SURGERY PROCEDURE UNLISTED  1978    Splenectomy    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, auxiliary spleen    TONSILLECTOMY      TRANSPLANT      VASCULAR SURGERY  1976         Outpatient Medications:   Current Facility-Administered Medications   Medication Dose Route Frequency Provider Last Rate Last Admin    acetaminophen (TYLENOL) tablet 650 mg  650 mg Oral Q4H PRN Whalen, Alona, DO   650 mg at 07/14/25 0613    Or    acetaminophen (TYLENOL) Suppository 650 mg  650 mg Rectal Q4H PRN Whalen, Alona, DO        albuterol (PROVENTIL HFA/VENTOLIN HFA) inhaler  2 puff Inhalation Q6H PRN Whalen, Alona, DO        aspirin (ASA) chewable tablet 81 mg  81 mg Oral Daily Whalen, Alona, DO   81 mg at 07/14/25 0808    benzocaine-menthol (CHLORASEPTIC MAX) 15-10 MG lozenge 1 lozenge  1 lozenge Buccal Q1H PRN Whalen, Alona, DO        calcium carbonate (TUMS) chewable tablet 1,000 mg  1,000 mg Oral 4x Daily PRN Whalen, Alona, DO        calcium citrate-vitamin D (CITRACAL) 315-6.25 MG-MCG per tablet 1 tablet  1 tablet Oral Daily with lunch Luis Angel,  Blake BURKS MD   1 tablet at 07/13/25 1211    doxycycline hyclate (VIBRAMYCIN) capsule 100 mg  100 mg Oral Q12H [facility] (08/20) Whalen, Alona, DO   100 mg at 07/14/25 0808    enoxaparin ANTICOAGULANT (LOVENOX) injection 40 mg  40 mg Subcutaneous Q24H Whalen, Alona, DO   40 mg at 07/13/25 1632    entecavir (BARACLUDE) tablet 0.5 mg  0.5 mg Oral Daily Whalen, Alona, DO   0.5 mg at 07/14/25 0810    FLUoxetine (PROzac) capsule 60 mg  60 mg Oral Daily Blake Plasencia MD   60 mg at 07/14/25 0808    fluticasone-vilanterol (BREO ELLIPTA) 100-25 MCG/ACT inhaler 1 puff  1 puff Inhalation Daily Whalen, Alona, DO        guaiFENesin (ROBITUSSIN) 20 mg/mL solution 200 mg  200 mg Oral Q4H PRN Chidi Blackmon MD   200 mg at 07/14/25 1022    hydrOXYzine HCl (ATARAX) tablet 25 mg  25 mg Oral At Bedtime PRN Whalen, Alona, DO        ipratropium - albuterol 0.5 mg/2.5 mg (3mg)/3 mL (DUONEB) neb solution 3 mL  3 mL Nebulization Q4H [place] Whalen, Alona, DO   3 mL at 07/14/25 0751    isosorbide mononitrate (IMDUR) 24 hr tablet 60 mg  60 mg Oral Daily Whalen, Alona, DO   60 mg at 07/14/25 0808    lidocaine (LMX4) cream   Topical Q1H PRN Whalen, Alona, DO        lidocaine 1 % 0.1-1 mL  0.1-1 mL Other Q1H PRN Whalen, Alona, DO        melatonin tablet 5 mg  5 mg Oral At Bedtime PRN Whalen, Alona, DO   5 mg at 07/13/25 2116    metoprolol succinate ER (TOPROL-XL) 24 hr half-tab 12.5 mg  12.5 mg Oral Daily Whalen, Alona, DO   12.5 mg at 07/13/25 2018    multivitamin w/minerals (THERA-VIT-M) tablet 1 tablet  1 tablet Oral Daily with lunch Blake Plasencia MD   1 tablet at 07/13/25 1211    mycophenolate (GENERIC EQUIVALENT) capsule 500 mg  500 mg Oral BID IS Lisa Levine APRN CNP   500 mg at 07/14/25 0809    naltrexone (DEPADE/REVIA) tablet 50 mg  50 mg Oral Daily Whalen, Alona, DO   50 mg at 07/14/25 0809    pantoprazole (PROTONIX) EC tablet 40 mg  40 mg Oral Daily Whalen, Alona, DO   40 mg at 07/14/25 0808    polyethylene glycol (MIRALAX) Packet 17 g  17 g Oral Daily Chace  Michael, DO   17 g at 07/13/25 0845    polyethylene glycol (MIRALAX) Packet 17 g  17 g Oral BID PRN Whalen, Alona, DO        potassium chloride juan luis ER (KLOR-CON M10) CR tablet 20 mEq  20 mEq Oral BID Whalen, Alona, DO   20 mEq at 07/14/25 0828    [Held by provider] predniSONE (DELTASONE) tablet 5 mg  5 mg Oral Daily Whalen, Alona, DO        prochlorperazine (COMPAZINE) injection 10 mg  10 mg Intravenous Q6H PRN Whalen, Alona, DO        Or    prochlorperazine (COMPAZINE) tablet 10 mg  10 mg Oral Q6H PRN Whalen, Alona, DO   10 mg at 07/09/25 1607    psyllium (METAMUCIL/KONSYL) Packet 1 packet  1 packet Oral Daily Blake Plasencia MD   1 packet at 07/14/25 0808    rosuvastatin (CRESTOR) tablet 20 mg  20 mg Oral Daily Whalen, Alona, DO   20 mg at 07/14/25 0808    senna-docusate (SENOKOT-S/PERICOLACE) 8.6-50 MG per tablet 1 tablet  1 tablet Oral BID PRN Whalen, Alona, DO        Or    senna-docusate (SENOKOT-S/PERICOLACE) 8.6-50 MG per tablet 2 tablet  2 tablet Oral BID PRN Whalen, Alona, DO        sodium chloride (PF) 0.9% PF flush 3 mL  3 mL Intracatheter Q8H NE Whalen, Alona, DO   3 mL at 07/13/25 2116    sodium chloride (PF) 0.9% PF flush 3 mL  3 mL Intracatheter q1 min prn Whalen, Alona, DO           Allergies:   Allergies   Allergen Reactions    Penicillins Shortness Of Breath and Hives     Occurred in childhood     Keflex [Cephalexin Hcl] Unknown     Patient could not recall reaction to Keflex  Per chart, has tolerated cefazolin (2023)    Sulfa Antibiotics Rash    Tetracycline Unknown     Patient could not recall reaction, childhood reaction       Family Hx:   Family History   Problem Relation Age of Onset    Asthma Mother     Arthritis Mother     Cardiovascular Father         AI with valve repair    Hypertension Father     Kidney Disease Father     Other - See Comments Father         AI with valve repair    Hyperlipidemia Father     Heart Disease Father     Anxiety Disorder Maternal Grandmother     Depression Maternal Grandmother     Breast  Cancer Maternal Grandmother     Substance Abuse Maternal Grandfather     Cerebrovascular Disease Maternal Grandfather     Other - See Comments Maternal Grandfather         cerebrovascular disease    Depression Maternal Grandfather     Dementia Maternal Grandfather     Heart Disease Maternal Grandfather     Cancer Paternal Grandmother         ovarian ca    Ovarian Cancer Paternal Grandmother     Depression Paternal Grandmother     Uterine Cancer Paternal Grandmother     Cerebrovascular Disease Paternal Grandfather     Dementia Paternal Grandfather     Heart Disease Paternal Grandfather     Kidney Disease Paternal Aunt     Kidney Disease Paternal Aunt     Skin Cancer No family hx of     Glaucoma No family hx of     Macular Degeneration No family hx of     Amblyopia No family hx of     Melanoma No family hx of     Diabetes No family hx of          Physical Exam:   /81 (BP Location: Left arm)   Pulse 66   Temp 97.6  F (36.4  C) (Axillary)   Resp 20   Wt 71.6 kg (157 lb 14.4 oz)   SpO2 92%   BMI 24.73 kg/m    - Gen: Aox3, NAD   - CV: RRR, no m/r/g  - Lung: minimal wheezing, improving. Crackles at the bases  - Abd: NTND, +BS  - Ext: No BLE swelling  - Neuro: CN grossly intact     Images/Studies:   CT Chest: 1. Mosaic round glass attenuation with superimposed bronchovascular  groundglass nodules. These findings are nonspecific. Top differential  is ARDS. Differential also includes atypical infection, early diffuse  alveolar hemorrhage, organizing pneumonia or early pulmonary edema.  Recommend pulmonary consultation and if indicated bronchial alveolar  lavage for further differentiation.    CXR: Increase interstitial opacities, likely fluid overload.    TTE (7/14):  Global and regional left ventricular function is normal with an EF of 55-60%.  Global right ventricular function is mildly reduced.  No hemodynamically significant valve abnormalties.  The inferior vena cava was normal in size with preserved  respiratory  variability.  No pericardial effusion.    Labs:   ABG  Recent Labs   Lab 07/09/25  1617   PH 7.36   PCO2 39   PO2 74*   HCO3 22     CBC  Recent Labs   Lab 07/13/25  1114 07/11/25 0716 07/10/25  0936 07/09/25  1038   WBC 11.6* 12.3* 8.7 12.0*   HGB 12.7* 12.1* 13.1* 14.0    252 252 275     BMP  Recent Labs   Lab 07/14/25  0809 07/13/25 1817 07/13/25 1114 07/11/25 0716    134* 135 137   POTASSIUM 3.3* 3.4 3.8 3.8   CHLORIDE 95* 93* 98 103   CO2 28 26 26 24   BUN 20.4 14.7 14.2 15.0   CR 0.72 0.66* 0.64* 0.66*   GLC 86 206* 110* 144*     LFT  Recent Labs   Lab 07/14/25  0809 07/13/25 1817 07/11/25 0716 07/10/25  0936 07/09/25  1038   AST  --   --   --   --  49*   ALT  --   --   --   --  23   ALKPHOS  --   --   --   --  82   BILITOTAL  --   --   --   --  1.0   ALBUMIN 3.2* 3.2* 3.1* 3.2* 3.5   INR  --   --   --   --  1.03     Coagulation Profile  Recent Labs   Lab 07/09/25  1038   INR 1.03

## 2025-07-14 NOTE — CONSULTS
"SP[facility]UAL [facility] Consult Note  ([place]) 7A  Referral Source/Reason for Visit: Routine Consult per Patient Request  Summary and Recommendations -    Jerad benefited from discussing his plans and desires around his healthcare. He noted that \"It feels good to be able to say out loud what I had been thinking.\"  Jerad asked for information about health care directives. I informed the charge nurse who put in a consult to .  Jerad's rasheed is an important part of his life. I offered to pray for him today and he welcomed it.    PLAN: [facility] remain available on request. Please place a standard consult order on Epic.    Sarahy Higginbotham  Chaplain Resident    [facility] is available 24/7 for emergent requests and consults, either by paging the on-call  or by entering an ASAP/STAT consult in [employer], which will also page the on-call .    Assessment    Saw pt Jerad BARRAGAN Vandana    Patient/Family Understanding of Illness and Goals of Care -   Jerad said that he had a cold that \"seemed more severe than the colds I usually get.\" When he came to the ER he had trouble breathing and his oxygen levels were low. He understands that he is here until his oxygen levels stabilize.    Distress and Loss -   Jerad shared that he had been on dialysis when he was young and had a kidney transplant when he was 18 years old. As such he has had a lifetime of health issues and losses in life due to his illness.    Strengths, Coping, and Resources -   Jerad has a sister and a brother as well as nieces and nephew who support him. He also is a part of a men's bible study and they have been supportive of him as well.  Jerad stated that he has made known to the medical team that he wants to be DNR. Jerad said, \"I know that God will welcome me and that sounds kind of nice,\" in speaking about death. At the same time he said, \"I also want to enjoy God's blessings of life here and now.\"   Jerad " "has noticed that it's getting harder for him to do things and is realizing he won't always be able to live in his house.   Jerad wants to speak with his sister about his feelings around his healthcare and his feelings about death.  Jerad has requested information about healthcare directives and is interested in doing one.  Jerad benefited from discussing his plans and desires around his healthcare. He noted that \"It feels good to be able to say out loud what I had been thinking.\"    Meaning, Beliefs, and Spirituality -   Jerad said he is a part of \"a small independent [employer].\" His rasheed is an important part of his life.  I offered to pray for Jerad and he welcomed that.  "

## 2025-07-14 NOTE — PROGRESS NOTES
1500 - 1930    Vitals: /73 (BP Location: Left arm)   Pulse 73   Temp 98.5  F (36.9  C) (Oral)   Resp 19   Wt 71.6 kg (157 lb 14.4 oz)   SpO2 97%   BMI 24.73 kg/m    O2 per high flow nasal canula, 40L, sats 92-94% (was on Bipap) Moderate hacking dry cough   Endocrine: n/a  Labs: Mag and phos replaced orally per protocol.  Pain: Generalized pain, minimal.  PRN's: Robitussin for cough  Diet: Low saturated fat, 2400 mg Na+, no caffeine diet, fair appetite, denies nausea.  LDA: L PIV saline locked.  GI: BM 7/13.  : Small voids, urine is concentrated, and  light/pale.  Received Lasix X 2 today.  Skin: Generalized scabs, moles.  Neuro: Alert and oriented.  Mobility: Stand by assist to the BR, dyspnea. O2 per FM 10L with ambulation.  Education: n/a  Plan: Continue with plan of care.

## 2025-07-14 NOTE — PROGRESS NOTES
[facility]  Transplant Nephrology Progress Note  Date of Admission:  7/9/2025  Today's Date: 07/14/2025    Recommendations:   - Agree with potassium replacement today.  - Agree with furosemide 20 mg IV today. Can restart PTA lasix 20 mg PO daily tomorrow. Goal net negative 1L today.   - No acute indications for dialysis.  - Would make no changes in immunosuppression.  - Appreciate Pulmonary recommendations.    Assessment & Plan   # DDKT: Stable creatinine. Good urine output.     - Baseline Creatinine: ~ 0.7-0.9   - Proteinuria: Normal (<0.2 grams)   - DSA Hx: Not checked recently due to time from transplant   - Last cPRA: unknown%   - BK Viremia: Not checked recently due to time from transplant   - Kidney Tx Biopsy Hx: No biopsy history.    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     # Immunosuppression Prior to Admission: Mycophenolate mofetil (dose 750 mg every 12 hours) and Prednisone (dose 5 mg daily)   - Present Immunosuppression: Mycophenolate mofetil (dose 500 mg every 12 hours) and Prednisone (dose 5 mg daily)    - Induction with Recent Transplant:  Not known due to time from transplant   - Continue with intensive monitoring of immunosuppression for efficacy and toxicity.   - Historical Changes in Immunosuppression: None   - Changes: Not at this time    # Infection Prevention:   Last CD4 Level: Not checked recently  - PJP: None      - CMV IgG Ab High Risk Discordance (D+/R-) at time of transplant: Unknown  Present CMV Serostatus: Unknown  - EBV IgG Ab High Risk Discordance (D+/R-) at time of transplant: Unknown  Present EBV Serostatus: Unknown    # Hypertension: Controlled;  Goal BP: < 140/90 (Hospitalization goal)   - Changes: Not at this time    # Anemia in Chronic Renal Disease: Hgb: Stable      CAROL ANN: No   - Iron studies: Not checked recently    # Mineral Bone Disorder:    - Calcium; level: Normal        On supplement: Yes; calcium citrate 315  mg daily.  - Phosphorus; level: Normal        On supplement: No    # Electrolytes:  - Potassium; level: Low        On supplement: No; will get 20 mEq PO potassium today.   - Magnesium; level: Normal        On supplement: No  - Bicarbonate; level: Normal        On supplement: No  - Sodium; level: Low    # Acute hypoxic respiratory failure:  # Pulmonary edema:  # Viral versus Atypical Pneumonia: + Rhino/enterovirus. CT PE negative. Gram + Bcx on 07/09, likely contaminant. Repeat blood cultures 07/10. Sputum culture 7/12 likely contaminated, per report. Repeat pending. CXR 7/13 with pulmonary edema. Started on ceftriaxone and currently on prednisone 40 mg daily and doxycycline. Management per primary team. Pulmonology following.    # Acute decompensated heart failure: echo in 2023 with no evidence of HF. BNP 1800 on 7/13. PTA on 20 mg lasix PO daily.    - Echo 7/14; EF 55-65% with grade I diastolic dysfunction. No pericardial effusion.   - Management per primary team.     # Other Significant PMH:   - CAD, s/p CABG: Last cardiac stress test was abnormal in 6/2023 (but unchanged from prior testing in 2022).  Coronary angiogram 9/2021 that showed some possible obstructive disease in the 1st diagonal, but unable to stent it.  Bypass grafts were open.  Plan is for medical management. Last cardiac echo (8/2023) showed LVEF ~ 60-65%. Normal right ventricular systolic function. Normal diastolic dysfunction.    - Provoked PE (8/2023): after right hip revision surgery. Completed AC.   - Chronic Hepatitis B: Stable on entecavir. Follows here with hepatology.   - Asthma: Some increase in shortness of breath, but felt more cardiac in origin.  Patient is stable on inhalers.   - GERD: Asymptomatic on pantoprazole, but with h/o ulcer, will continue on medication.   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    Seen and discussed with Dr. Bailon.     RONALDO [facility]  Transplant Nephrology    Interval History  Mr. Presley creatinine is 0.66 (07/13 1817); Stable.  Good urine output.  Other significant labs/tests/vitals: VSS. Tolerated bipap overnight.   No events overnight.  He reports mild cough today with improvement in SOB. No CP.  No leg swelling.  No nausea and vomiting.  Bowel movements are formed.  No fever, sweats or chills.        Review of Systems   4 point ROS was obtained and negative except as noted in the Interval History.    MEDICATIONS:  Current Facility-Administered Medications   Medication Dose Route Frequency Provider Last Rate Last Admin    aspirin (ASA) chewable tablet 81 mg  81 mg Oral Daily Whalen, Alona, DO   81 mg at 07/13/25 0845    calcium citrate-vitamin D (CITRACAL) 315-6.25 MG-MCG per tablet 1 tablet  1 tablet Oral Daily with lunch Blake Plasencia MD   1 tablet at 07/13/25 1211    doxycycline hyclate (VIBRAMYCIN) capsule 100 mg  100 mg Oral Q12H [facility] Whalen, Alona, DO   100 mg at 07/13/25 2018    enoxaparin ANTICOAGULANT (LOVENOX) injection 40 mg  40 mg Subcutaneous Q24H Whalen, Alona, DO   40 mg at 07/13/25 1632    entecavir (BARACLUDE) tablet 0.5 mg  0.5 mg Oral Daily Whalen, Alona, DO   0.5 mg at 07/13/25 0845    FLUoxetine (PROzac) capsule 60 mg  60 mg Oral Daily Blake Plasencia MD   60 mg at 07/13/25 0845    fluticasone-vilanterol (BREO ELLIPTA) 100-25 MCG/ACT inhaler 1 puff  1 puff Inhalation Daily Whalen, Alona, DO        ipratropium - albuterol 0.5 mg/2.5 mg (3mg)/3 mL (DUONEB) neb solution 3 mL  3 mL Nebulization Q4H WA Whalen, Alona, DO   3 mL at 07/14/25 0751    isosorbide mononitrate (IMDUR) 24 hr tablet 60 mg  60 mg Oral Daily Alona Whalen DO   60 mg at 07/13/25 9076     metoprolol succinate ER (TOPROL-XL) 24 hr half-tab 12.5 mg  12.5 mg Oral Daily Whalen, Alona, DO   12.5 mg at 25    multivitamin w/minerals (THERA-VIT-M) tablet 1 tablet  1 tablet Oral Daily with lunch Blake Plasencia MD   1 tablet at 25 1211    mycophenolate (GENERIC EQUIVALENT) capsule 500 mg  500 mg Oral BID IS Lisa Levine, RONALDO CNP   500 mg at 25 1757    naltrexone (DEPADE/REVIA) tablet 50 mg  50 mg Oral Daily Whalen, Alona, DO   50 mg at 25 0845    pantoprazole (PROTONIX) EC tablet 40 mg  40 mg Oral Daily Whalen, Alona, DO   40 mg at 25 0845    polyethylene glycol (MIRALAX) Packet 17 g  17 g Oral Daily Krumm, Michael, DO   17 g at 25 0845    predniSONE (DELTASONE) tablet 40 mg  40 mg Oral Daily Blake Plasencia MD   40 mg at 25 0845    [Held by provider] predniSONE (DELTASONE) tablet 5 mg  5 mg Oral Daily Whalen, Alona, DO        psyllium (METAMUCIL/KONSYL) Packet 1 packet  1 packet Oral Daily Blake Plasencia MD   1 packet at 25 0911    rosuvastatin (CRESTOR) tablet 20 mg  20 mg Oral Daily Whalen, Alona, DO   20 mg at 25 0845    sodium chloride (PF) 0.9% PF flush 3 mL  3 mL Intracatheter Q8H NE Whalen, Alona, DO   3 mL at 25 2116     Current Facility-Administered Medications   Medication Dose Route Frequency Provider Last Rate Last Admin       Physical Exam   Temp  Av.3  F (36.8  C)  Min: 97.8  F (36.6  C)  Max: 98.8  F (37.1  C)      Pulse  Av  Min: 63  Max: 80 Resp  Av.9  Min: 18  Max: 44  FiO2 (%)  Av.2 %  Min: 50 %  Max: 65 %  SpO2  Av.2 %  Min: 90 %  Max: 99 %     /81 (BP Location: Left arm)   Pulse 66   Temp 97.6  F (36.4  C) (Axillary)   Resp 24   SpO2 95%     Admit       GENERAL APPEARANCE: alert and no distress  HENT: mouth without ulcers or lesions  RESP: bilateral wheezing R>L  CV: regular rhythm, normal rate, no rub, no murmur  EDEMA: no LE edema bilaterally  ABDOMEN: soft, nondistended, nontender  MS:  extremities normal - no gross deformities noted, no evidence of inflammation in joints, no muscle tenderness  SKIN: no rash, numerous keratoses  TX KIDNEY: normal  DIALYSIS ACCESS: LUE AV Fistula (No thrill)    Data   All labs reviewed by me.  CMP  Recent Labs   Lab 07/13/25  1817 07/13/25  1114 07/11/25  0716 07/10/25  0936 07/09/25  1038   * 135 137 137 140   POTASSIUM 3.4 3.8 3.8 3.9 3.4   CHLORIDE 93* 98 103 102 104   CO2 26 26 24 19* 22   ANIONGAP 15 11 10 16* 14   * 110* 144* 103* 85   BUN 14.7 14.2 15.0 14.9 12.4   CR 0.66* 0.64* 0.66* 0.68 0.72   GFRESTIMATED >90 >90 >90 >90 >90   HEMA 9.6 9.7 9.2 8.9 9.3   MAG 1.8  --  2.1 1.9  --    PHOS 2.9  --  2.5 3.2  --    PROTTOTAL  --   --   --   --  6.6   ALBUMIN 3.2*  --  3.1* 3.2* 3.5   BILITOTAL  --   --   --   --  1.0   ALKPHOS  --   --   --   --  82   AST  --   --   --   --  49*   ALT  --   --   --   --  23     CBC  Recent Labs   Lab 07/13/25  1114 07/11/25  0716 07/10/25  0936 07/09/25  1038   HGB 12.7* 12.1* 13.1* 14.0   WBC 11.6* 12.3* 8.7 12.0*   RBC 4.48 4.30* 4.55 4.91   HCT 40.3 37.7* 41.3 44.0   MCV 90 88 91 90   MCH 28.3 28.1 28.8 28.5   MCHC 31.5 32.1 31.7 31.8   RDW 15.2* 15.9* 16.0* 15.9*    252 252 275     INR  Recent Labs   Lab 07/09/25  1038   INR 1.03     ABG  Recent Labs   Lab 07/09/25  1647 07/09/25  1617 07/09/25  1038   PH  --  7.36  --    PCO2  --  39  --    PO2  --  74*  --    HCO3  --  22  --    O2PER 15 4 21      Urine Studies  Recent Labs   Lab Test 07/09/25  1346 08/30/23  1856 08/20/23  1245 08/11/23  1928 06/03/20  1307   COLOR Yellow Yellow Light Yellow Light Yellow Yellow   APPEARANCE Slightly Cloudy* Clear Clear Clear Clear   URINEGLC Negative Negative Negative Negative Negative   URINEBILI Negative Negative Negative Negative Negative   URINEKETONE Trace* 10* 60* 10* Negative   SG 1.015 1.021 1.015 1.009 1.008   UBLD Negative Negative Negative Negative Negative   URINEPH 6.0 5.0 6.0 6.0 6.5   PROTEIN 20* 10*  20* 10* Negative   UROBILINOGEN  --   --   --   --  <2.0 E.U./dL   NITRITE Negative Negative Negative Negative Negative   LEUKEST Negative Negative Negative Negative Negative   RBCU 1 <1 <1 0  --    WBCU 7* 3 1 3  --      Recent Labs   Lab Test 10/28/20  0902 10/01/19  0942 04/02/19  0917 10/23/18  1122 06/21/18  1209   [facility] 0.23* 0.26* 0.23* 0.21* 0.12     PTH  No lab results found.  Iron Studies  Recent Labs   Lab Test 10/02/23  0943   IRON 30*      IRONSAT 10*   LA 50       IMAGING:  All imaging studies reviewed by me.

## 2025-07-14 NOTE — PLAN OF CARE
Goal Outcome Evaluation:      Plan of Care Reviewed With: patient    Overall Patient Progress: no change    Outcome Evaluation: pt tolerating BiPaP overnight    /81 (BP Location: Left arm)   Pulse 66   Temp 97.6  F (36.4  C) (Axillary)   Resp 24   SpO2 94%     Shift: [id number]  Isolation Status: Droplet, Human Rhinovirus  VS: Stable on BiPAP 40% FiO2, afebrile  Neuro: Aox4  Behaviors: Calm and cooperative  Respiratory: Tachypneic in the mid 20s  Cardiac: WDL  Pain/Nausea: c/o generalized body pain, denies nausea  PRN: Tylenol x2, Melatonin x1, Robitussin x3  Diet: Low Saturated Fat Na <2400mg Diet   IV Access: R PIV SL  GI/: Voiding adequately, LBM 7/13  Skin: WDL  Mobility: Stand-by assist  Plan: Continue with POC and notify team with any changes

## 2025-07-14 NOTE — PROGRESS NOTES
[facility]    Progress Note - Medicine Service, MAROON TEAM 2       Date of Admission:  7/9/2025    Assessment & Plan   65 year old male with PMHx of NSTEMI, CAD s/p CABG (2020), possible COPD, on chronic immunosuppression s/p renal transplant x2 presenting with worsening dyspnea and cough found to have positive resp PCR panel for Human Rhin/Enterovirus with bilateral ground glass opacities c/f possible viral vs atypical pneumonia, admitted for AHRF secondary to undifferentiated pneumonia. Initially, treated for possible CAP and COPD exacerbation with IV antibiotics, steroids and duonebs. Now with increased pulmonary edema on CXR and elevated BNP, consistent with acute decompensated HFpEF being treated with modest IV diuresis.     Changes Today:  -continue on IV diuresis with close urine output and electrolyte monitoring.  -restart PTA lasix 20 mg PO daily tomorrow (7/15) with goal of net negative 1L/day.  -repeat ECHO (7/14) showed 55-60% LEF with mildly reduced REF  -sputum induction with RT unsuccessful   -Incentive spirometry per pulm rec    #AHRF  #Acute decompensated heart failure  #C/f viral vs atypical pneumonia  Patient with acute hypoxemic respiratory failure and 3-day history of worsening dyspnea and nonproductive cough, requiring BiPAP on arrival. Initial concern for viral or atypical pneumonia given mild leukocytosis, elevated inflammatory markers, and CT chest showing bilateral ground glass opacities. RPP positive for rhinovirus/enterovirus, but this alone unlikely to cause severe lower respiratory tract disease. Urine antigens for Strep pneumo and Legionella negative. Treated empirically with IV ceftriaxone and doxycycline for 5 days without clinical improvement. Blood cultures initially grew MRSE, likely contaminant; repeat cultures negative.    Persistent hypoxia out of proportion to typical CAP or COPD raised concern for alternative or concurrent  cardiogenic cause. CXR showed worsening pulmonary edema; BNP elevated (1800). ECHO on 7/14 showed preserved LVEF (55-60%) with mild RV dysfunction, likely secondary to bilateral infiltrates or chronic lung disease. Findings consistent with HFpEF with acute pulmonary edema. Diuresis initiated with IV furosemide; monitoring electrolytes, fluid balance, and I/Os. Consider initiating SGLT2 inhibitor (e.g., empagliflozin) once adequately diuresed and hemodynamically stable to reduce symptoms and prevent rehospitalization (Charlie et al., [facility] 2021).    Downtrending CRP from 181 (7/9) to 135 (7/14) suggests improving inflammation, though lack of clinical improvement may be due to concurrent HFpEF-related pulmonary edema.    (Charlie CAMACHO, et.al. In-Hospital Initiation of Sodium-Glucose Cotransporter-2 Inhibitors for Heart Failure With Reduced Ejection Fraction. J Am Jovana Cardiol. 2021 Nov 16;PMCID: [id number].)    Plan:  -IV diuresis with close monitoring of electrolytes. Strict I&Os.  -Continue and complete IV Ceftriaxone (7/9-7/13) and PO Doxycycline (7/11-7/15*) course.  -Continue duonebs q4h while awake  -Solumedrol 125mg IV 7/9, prednisone 40mg PO daily (7/10 - ). Will determine length of steroids on clinical progress.   -Pulm consult  Continue antibiotics above (azithromycin stopped due to prolonged Qtc)  Duonebs q4h while awake (ordered)  Continue breo (ordered)  Methylpred 125 mg IV x1, prednisone 40 mg (7/10 - )  Does not warrant bronchoscopy at the moment  -Work up thus far:   Respiratory viral panel (positive for rhino/entero)  COVID, Influenza (negative)  Sputum culture 7/9 contaminated; will try for repeat collection 7/12   Urine histo and blasto ab, ag (negative)  Urine strep and legionella (negative)  B d glucan and aspergillosis (negative)  Procalcitonin 0.16  Repeat procalcitonin 0.06  MRSA swab (negative)    #Acute decompensated heart failure  #HFpEF (LVEF 55-60%)  #Hx of CAD s/p CABG  #Hx NSTEMI  NSTEMI in 2014.  Previous ECHO from 8/2023 with LVEF of 60-65%.   Concern for acute decompensated heart failure with preserved EF (HFpEF) given likely pulmonary edema seen on CXR and increased BNP. Initiated IV diuresis.  7/14 ECHO with normal global and regional left ventricular function with an LVEF of 55-60%.  Global right ventricular function is mildly reduced. No hemodynamically significant valve abnormalties. The inferior vena cava was normal in size with preserved respiratory variability with no pericardial effusion. Compared to study in 2023, global RV function is slightly lower. Findings consistent with HFpEF.     Plan:  -Continue IV diuresis with close monitoring of urine output and electrolytes.  -Goal of net -1L urine output/day.  -Per nephro: Can restart PTA lasix 20 mg PO daily tomorrow.  -PTA Aspirin 81 mg daily  -Holding Nitroglycerin 0.4mg due to patient not taking                #COPD exacerbation   Reviewed PFTs from 4/2018 which showed moderate restriction with FVC 63%. Reports a 5 pack year smoking history. Unclear if patient has obstructive lung disease. Diagnosis of COPD appears to come from inaccurate health form filled out by patient years ago. Ideally get updated PFTs and DCLO testing. Interestingly he has had improvement with Bipap, but did not have evidence of CO2 retention on gases suggesting less obstructive pathology.      Plan:  -Incentive spirometry   -steroids, antibiotics as above   -Duonebs q4h while awake  -Will continue PTA COPD inhalers for now  -PTA Breo Ellipta 1 puff/day  -PTA albuterol PRN       #Hx of renal transplant   #chronic immunosuppression  Hx of renal failure on dialysis at age 16, and kidney transplant, infected with hepatitis B from dialysis.      Plan:  Reduce PTA Mycophenolate 500mg BID (decreased dose for 5 days starting 7/10-7/14*, per nephrology)  Holding PTA Prednisone 5mg daily while on higher dose      #Oral Thrush   Pt noticed oral thrush and observed on buccal mucosa  and tongue.  Ordered Nystatin.    #Lactic acidosis, resolved    Likely secondary to severe infection in setting of AHRF. LA on admission 3.3 --> 2.5 --> 1.4.      #Elevated troponins  #Type 2 demand ischemia   Likely secondary to demand ischemia in setting of severe infection and respiratory failure. Without chest pain or signs of fluid overload on exam. EKG without acute ischemic changes. POC ECHO without pericardial effusion and normal appearing ejection fraction.    Plan:  -Formal ECHO ordered    #HTN     Plan:  PTA Metoprolol succinate 12.5mg daily  PTA Imdur 60 mg daily       #HLD     Plan:  -rosuvastatin 20mg daily     #Chronic hepatitis B     Plan:  -entecavir 0.5mg daily     #GERD     Plan:  -Pantoprazole 40mg daily     #KIM     Plan:  PTA Atarax 10-25mg PRN  PTA Fluoxetine 20mg daily  PTA Fluoxetine 40mg daily            Diet: Combination Diet Low Saturated Fat Na <2400mg Diet, No Caffeine Diet    DVT Prophylaxis: Enoxaparin (Lovenox) SQ  [place] Catheter: Not present  Fluids: none  Lines: None     Cardiac Monitoring: None  Code Status: No CPR- Do NOT Intubate      Clinically Significant Risk Factors        # Hypokalemia: Lowest K = 3.3 mmol/L in last 2 days, will replace as needed  # Hyponatremia: Lowest Na = 134 mmol/L in last 2 days, will monitor as appropriate  # Hypochloremia: Lowest Cl = 93 mmol/L in last 2 days, will monitor as appropriate      # Hypoalbuminemia: Lowest albumin = 3.1 g/dL at 7/11/2025  7:16 AM, will monitor as appropriate     # Hypertension: Noted on problem list                       Social Drivers of Health   Tobacco Use: Medium Risk (6/30/2025)    Patient History     Smoking Tobacco Use: Former     Smokeless Tobacco Use: Former     Passive Exposure: Never   Physical Activity: Insufficiently Active (11/5/2024)    Exercise Vital Sign     Days of Exercise per Week: 5 days     Minutes of Exercise per Session: 20 min   Stress: Stress Concern Present (11/5/2024)    [employer] [employer]  [employer]upational [employer] - Occupational Stress Questionnaire     Feeling of Stress : To some extent   Social Connections: Unknown (11/5/2024)    Social Connection and Isolation Panel [NHANES]     Frequency of Social Gatherings with Friends and Family: More than three times a week         Disposition Plan     Medically Ready for Discharge: Anticipated in 2-4 Days       The patient's care was discussed with the Attending Physician, Dr. Plasencia.    [facility]  Medicine Service, [address]  Securely message with [employer] (more info)  Text page via [facility] Paging/Directory   See signed in provider for up to date coverage information    .Resident/Fellow Attestation   I, Alona Whalen DO, was present with the medical/ALISHA student who participated in the service and in the documentation of the note.  I have verified the history and personally performed the physical exam and medical decision making.  I agree with the assessment and plan of care as documented in the note.      Alona Whalen DO  PGY1  Date of Service (when I saw the patient): 07/14/25   ______________________________________________________________________    Interval History   No acute events overnight. This morning, Ander is feeling about the same. Back on HFNC upon entering. Still feels like his work of breathing is increased. He did report his breathing improved with lasix. Noticed oral thrush.     Physical Exam   Vital Signs: Temp: 97.6  F (36.4  C) Temp src: Axillary BP: 119/81 Pulse: 66   Resp: 20 SpO2: 92 % O2 Device: High Flow Nasal Cannula (HFNC) Oxygen Delivery: 15 LPM  Weight: 157 lbs 14.4 oz    .Physical Exam  Vitals reviewed.   HENT:      Head: Normocephalic and atraumatic.      Mouth/Throat:      Comments: Oral thrush on buccal mucosa  Cardiovascular:      Rate and Rhythm: Normal rate and regular rhythm.   Pulmonary:      Effort: Tachypnea and respiratory distress present.      Breath sounds:  Wheezing and rhonchi present.      Comments: Wheezing and rhonchi present bilaterally in lower and middle lung fields.  Abdominal:      General: Abdomen is flat.      Palpations: Abdomen is soft.   Musculoskeletal:      Right lower leg: No edema.      Left lower leg: No edema.   Neurological:      Mental Status: He is alert.           Medical Decision Making       Please see A&P for additional details of medical decision making.      Data     I have personally reviewed the following data over the past 24 hrs:    11.6 (H)  \   12.7 (L)   / 265     135 95 (L) 20.4 /  86   3.3 (L) 28 0.72 \     ALT: N/A AST: N/A AP: N/A TBILI: N/A   ALB: 3.2 (L) TOT PROTEIN: N/A LIPASE: N/A     Trop: N/A BNP: 1,889 (H)     Procal: 0.06 CRP: N/A Lactic Acid: N/A         Imaging results reviewed over the past 24 hrs:   Recent Results (from the past 24 hours)   Echocardiogram Complete   Result Value    LVEF  55-60%    Narrative    [id number]  [id number]  [id number]  [id number]^ANTONIO^BARBARA^     [facility],[place]  Echocardiography Laboratory  [address] [place] [id number]     Name: MANSI LIRA  MRN: [id number]  : 1960  Study Date: 2025 08:39 AM  Age: 65 yrs  Gender: Male  Patient Location: [facility]  Reason For Study: Heart Failure  Ordering Physician: BARBARA ALVAREZ  Performed By: Carlos Manuel Chris RDCS     BSA: 1.9 m2  Height: 67 in  Weight: 171 lb  HR: 62  BP: 150/92 mmHg  ______________________________________________________________________________  Procedure  Echocardiogram with two-dimensional, color and spectral Doppler.  ______________________________________________________________________________  Interpretation Summary  Global and regional left ventricular function is normal with an EF of 55-60%.  Global right ventricular function is mildly reduced.  No hemodynamically significant valve abnormalties.  The inferior vena cava was normal in size with preserved respiratory  variability.  No  pericardial effusion.     This study was compared with the study from 8/21/23. Global RV function is  slightly lower.  ______________________________________________________________________________  Left Ventricle  Global and regional left ventricular function is normal with an EF of 55-60%.  Left ventricular size is normal. Relative wall thickness is increased  consistent with concentric remodeling. Grade I or early diastolic dysfunction.  No regional wall motion abnormalities are seen.     Right Ventricle  The right ventricle is normal size. Global right ventricular function is  mildly reduced.     Atria  The left atrium appears normal. Moderate right atrial enlargement is present.     Mitral Valve  The mitral valve is normal.     Aortic Valve  The aortic valve is tricuspid. Trace aortic insufficiency is present.     Tricuspid Valve  The tricuspid valve is normal. Trace tricuspid insufficiency is present.  Estimated pulmonary artery systolic pressure is 33 mmHg plus right atrial  pressure. Pulmonary artery systolic pressure is normal.     Pulmonic Valve  The pulmonic valve is normal. Trace pulmonic insufficiency is present.     Vessels  Normal diameter aortic root and proximal ascending aorta. The inferior vena  cava was normal in size with preserved respiratory variability.     Pericardium  No pericardial effusion is present.     Compared to Previous Study  This study was compared with the study from 8/21/23 .  ______________________________________________________________________________  MMode/2D Measurements & Calculations     IVSd: 1.2 cm  LVIDd: 4.3 cm  LVIDs: 2.8 cm  LVPWd: 1.2 cm  FS: 34.2 %  LV mass(C)d: 184.5 grams  LV mass(C)dI: 97.5 grams/m2  asc Aorta Diam: 3.6 cm  LVOT diam: 2.4 cm  LVOT area: 4.6 cm2  Asc Ao diam index BSA (cm/m2): 1.9  Asc Ao diam index Ht(cm/m): 2.1  LA Volume Index (BP): 22.0 ml/m2  RWT: 0.57     TAPSE: 1.2 cm     Doppler Measurements & Calculations  MV E max jefferson: 37.6 cm/sec  MV  A max jefferson: 66.2 cm/sec  MV E/A: 0.57  MV dec slope: 136.2 cm/sec2  MV dec time: 0.28 sec  PA acc time: 0.08 sec  TR max jefferson: 290.0 cm/sec  TR max P.6 mmHg  E/E' av.6  Lateral E/e': 6.8  Medial E/e': 10.3     ______________________________________________________________________________  Report approved by: Balta Smith MD on 2025 10:06 AM

## 2025-07-14 NOTE — PLAN OF CARE
Vitals: /81 (BP Location: Left arm)   Pulse 66   Temp 97.6  F (36.4  C) (Axillary)   Resp 20   Wt 71.6 kg (157 lb 14.4 oz)   SpO2 92%   BMI 24.73 kg/m    O2 per high flow nasal canula, 40L,(do not go by flowmeter value)sats 92-94% (was on Bipap) Moderate hacking dry cough (sputum sent this morning but poor sample)  Endocrine: n/a  Labs: K+ 3.3, oral replacement. New orders for RN managed electrolytes. Lab re- check at 1400.   Pain: Generalized pain, minimal.  PRN's: Robitussin, Tylenol.  Diet: Low saturated fat, 2400 mg Na+, no caffeine diet, fair appetite, denies nausea.  LDA: L PIV saline locked.  GI: BM 7/13.  : Small voids, urine is concentrated,  light pale after Lasix 20 mg IV X1.(575cc)  Skin: Generalized scabs, moles.  Neuro: Alert and oriented.  Mobility: Stand by assist to the BR, dyspnea. O2 per FM 10L with ambulation.  Education: n/a  Plan: Continue with plan of care. Echo completed.

## 2025-07-15 LAB
ALBUMIN SERPL BCG-MCNC: 3.2 G/DL (ref 3.5–5.2)
ANION GAP SERPL CALCULATED.3IONS-SCNC: 14 MMOL/L (ref 7–15)
BACTERIA SPEC CULT: NO GROWTH
BACTERIA SPEC CULT: NO GROWTH
BUN SERPL-MCNC: 24.8 MG/DL (ref 8–23)
CALCIUM SERPL-MCNC: 9.8 MG/DL (ref 8.8–10.4)
CHLORIDE SERPL-SCNC: 96 MMOL/L (ref 98–107)
CREAT SERPL-MCNC: 0.68 MG/DL (ref 0.67–1.17)
EGFRCR SERPLBLD CKD-EPI 2021: >90 ML/MIN/1.73M2
GLUCOSE SERPL-MCNC: 86 MG/DL (ref 70–99)
HCO3 SERPL-SCNC: 27 MMOL/L (ref 22–29)
MAGNESIUM SERPL-MCNC: 1.7 MG/DL (ref 1.7–2.3)
MAGNESIUM SERPL-MCNC: 1.9 MG/DL (ref 1.7–2.3)
PHOSPHATE SERPL-MCNC: 3.2 MG/DL (ref 2.5–4.5)
PHOSPHATE SERPL-MCNC: 4 MG/DL (ref 2.5–4.5)
POTASSIUM SERPL-SCNC: 3.6 MMOL/L (ref 3.4–5.3)
POTASSIUM SERPL-SCNC: 3.9 MMOL/L (ref 3.4–5.3)
SODIUM SERPL-SCNC: 137 MMOL/L (ref 135–145)

## 2025-07-15 PROCEDURE — 250N000013 HC RX MED GY IP 250 OP 250 PS 637

## 2025-07-15 PROCEDURE — 36415 COLL VENOUS BLD VENIPUNCTURE: CPT

## 2025-07-15 PROCEDURE — 84100 ASSAY OF PHOSPHORUS: CPT

## 2025-07-15 PROCEDURE — 250N000009 HC RX 250

## 2025-07-15 PROCEDURE — 250N000012 HC RX MED GY IP 250 OP 636 PS 637: Performed by: NURSE PRACTITIONER

## 2025-07-15 PROCEDURE — 82947 ASSAY GLUCOSE BLOOD QUANT: CPT

## 2025-07-15 PROCEDURE — 84132 ASSAY OF SERUM POTASSIUM: CPT

## 2025-07-15 PROCEDURE — 83735 ASSAY OF MAGNESIUM: CPT

## 2025-07-15 PROCEDURE — 94640 AIRWAY INHALATION TREATMENT: CPT

## 2025-07-15 PROCEDURE — 250N000013 HC RX MED GY IP 250 OP 250 PS 637: Performed by: STUDENT IN AN ORGANIZED HEALTH CARE EDUCATION/TRAINING PROGRAM

## 2025-07-15 PROCEDURE — 94640 AIRWAY INHALATION TREATMENT: CPT | Mod: 76

## 2025-07-15 PROCEDURE — 99233 SBSQ HOSP IP/OBS HIGH 50: CPT | Mod: GC | Performed by: STUDENT IN AN ORGANIZED HEALTH CARE EDUCATION/TRAINING PROGRAM

## 2025-07-15 PROCEDURE — 99232 SBSQ HOSP IP/OBS MODERATE 35: CPT | Performed by: NURSE PRACTITIONER

## 2025-07-15 PROCEDURE — 999N000157 HC STATISTIC RCP TIME EA 10 MIN

## 2025-07-15 PROCEDURE — 99233 SBSQ HOSP IP/OBS HIGH 50: CPT | Mod: GC | Performed by: INTERNAL MEDICINE

## 2025-07-15 PROCEDURE — 250N000011 HC RX IP 250 OP 636

## 2025-07-15 PROCEDURE — 250N000012 HC RX MED GY IP 250 OP 636 PS 637

## 2025-07-15 PROCEDURE — 120N000011 HC R&B TRANSPLANT UMMC

## 2025-07-15 RX ORDER — FUROSEMIDE 10 MG/ML
40 INJECTION INTRAMUSCULAR; INTRAVENOUS ONCE
Status: COMPLETED | OUTPATIENT
Start: 2025-07-15 | End: 2025-07-15

## 2025-07-15 RX ORDER — PREDNISONE 5 MG/1
5 TABLET ORAL DAILY
Status: DISCONTINUED | OUTPATIENT
Start: 2025-07-15 | End: 2025-07-21 | Stop reason: HOSPADM

## 2025-07-15 RX ORDER — MAGNESIUM OXIDE 400 MG/1
400 TABLET ORAL EVERY 4 HOURS
Status: COMPLETED | OUTPATIENT
Start: 2025-07-15 | End: 2025-07-15

## 2025-07-15 RX ADMIN — MYCOPHENOLATE MOFETIL 500 MG: 250 CAPSULE ORAL at 18:15

## 2025-07-15 RX ADMIN — GUAIFENESIN 200 MG: 200 SOLUTION ORAL at 05:37

## 2025-07-15 RX ADMIN — NYSTATIN 500000 UNITS: 100000 SUSPENSION ORAL at 19:51

## 2025-07-15 RX ADMIN — DOXYCYCLINE HYCLATE 100 MG: 100 CAPSULE ORAL at 19:51

## 2025-07-15 RX ADMIN — Medication 12.5 MG: at 19:51

## 2025-07-15 RX ADMIN — DOXYCYCLINE HYCLATE 100 MG: 100 CAPSULE ORAL at 09:21

## 2025-07-15 RX ADMIN — ISOSORBIDE MONONITRATE 60 MG: 60 TABLET, EXTENDED RELEASE ORAL at 09:19

## 2025-07-15 RX ADMIN — ACETAMINOPHEN 650 MG: 325 TABLET ORAL at 13:35

## 2025-07-15 RX ADMIN — FLUOXETINE HYDROCHLORIDE 60 MG: 40 CAPSULE ORAL at 09:19

## 2025-07-15 RX ADMIN — IPRATROPIUM BROMIDE AND ALBUTEROL SULFATE 3 ML: .5; 3 SOLUTION RESPIRATORY (INHALATION) at 09:38

## 2025-07-15 RX ADMIN — MYCOPHENOLATE MOFETIL 500 MG: 250 CAPSULE ORAL at 09:19

## 2025-07-15 RX ADMIN — NALTREXONE HYDROCHLORIDE 50 MG: 50 TABLET, FILM COATED ORAL at 09:19

## 2025-07-15 RX ADMIN — ENTECAVIR 0.5 MG: 0.5 TABLET ORAL at 09:19

## 2025-07-15 RX ADMIN — MAGNESIUM OXIDE TAB 400 MG (241.3 MG ELEMENTAL MG) 400 MG: 400 (241.3 MG) TAB at 13:35

## 2025-07-15 RX ADMIN — ASPIRIN 81 MG CHEWABLE TABLET 81 MG: 81 TABLET CHEWABLE at 09:19

## 2025-07-15 RX ADMIN — FUROSEMIDE 40 MG: 10 INJECTION, SOLUTION INTRAMUSCULAR; INTRAVENOUS at 11:06

## 2025-07-15 RX ADMIN — Medication 1 TABLET: at 11:07

## 2025-07-15 RX ADMIN — PANTOPRAZOLE SODIUM 40 MG: 40 TABLET, DELAYED RELEASE ORAL at 09:21

## 2025-07-15 RX ADMIN — GUAIFENESIN 200 MG: 200 SOLUTION ORAL at 11:23

## 2025-07-15 RX ADMIN — FUROSEMIDE 40 MG: 10 INJECTION, SOLUTION INTRAMUSCULAR; INTRAVENOUS at 16:34

## 2025-07-15 RX ADMIN — PREDNISONE 5 MG: 5 TABLET ORAL at 09:19

## 2025-07-15 RX ADMIN — ACETAMINOPHEN 650 MG: 325 TABLET ORAL at 16:43

## 2025-07-15 RX ADMIN — NYSTATIN 500000 UNITS: 100000 SUSPENSION ORAL at 16:34

## 2025-07-15 RX ADMIN — IPRATROPIUM BROMIDE AND ALBUTEROL SULFATE 3 ML: .5; 3 SOLUTION RESPIRATORY (INHALATION) at 12:49

## 2025-07-15 RX ADMIN — IPRATROPIUM BROMIDE AND ALBUTEROL SULFATE 3 ML: .5; 3 SOLUTION RESPIRATORY (INHALATION) at 19:42

## 2025-07-15 RX ADMIN — POLYETHYLENE GLYCOL 3350 17 G: 17 POWDER, FOR SOLUTION ORAL at 09:18

## 2025-07-15 RX ADMIN — NYSTATIN 500000 UNITS: 100000 SUSPENSION ORAL at 11:07

## 2025-07-15 RX ADMIN — NYSTATIN 500000 UNITS: 100000 SUSPENSION ORAL at 09:20

## 2025-07-15 RX ADMIN — ACETAMINOPHEN 650 MG: 325 TABLET ORAL at 05:37

## 2025-07-15 RX ADMIN — ENOXAPARIN SODIUM 40 MG: 40 INJECTION SUBCUTANEOUS at 16:34

## 2025-07-15 RX ADMIN — FLUTICASONE FUROATE AND VILANTEROL TRIFENATATE 1 PUFF: 100; 25 POWDER RESPIRATORY (INHALATION) at 09:19

## 2025-07-15 RX ADMIN — ROSUVASTATIN CALCIUM 20 MG: 20 TABLET, FILM COATED ORAL at 09:20

## 2025-07-15 RX ADMIN — MAGNESIUM OXIDE TAB 400 MG (241.3 MG ELEMENTAL MG) 400 MG: 400 (241.3 MG) TAB at 16:34

## 2025-07-15 RX ADMIN — IPRATROPIUM BROMIDE AND ALBUTEROL SULFATE 3 ML: .5; 3 SOLUTION RESPIRATORY (INHALATION) at 16:35

## 2025-07-15 ASSESSMENT — ACTIVITIES OF DAILY LIVING (ADL)
ADLS_ACUITY_SCORE: 47

## 2025-07-15 NOTE — PROGRESS NOTES
[facility]   Pulmonary Consult Note - Initial    Jerad Ross MRN: [id number]  Date of Admission:7/9/2025  Date of Service: 07/15/2025    Reason for consult: 66 yo male on chronic immunosuppression s/p renal transplant. Found to have b/l ground glass opacities on CT c/f ARDS vs atypical infection vs early diffuse aveolar hemorrhage vs organizing pnuemonia. Is bronch with BAL needed?   Primary service: [facility]      ASSESSMENT:     65 year old male with PMHx of NSTEMI, CAD s/p CABG (2020), possible asthma/COPD, on chronic immunosuppression s/p renal transplant x2 presenting with worsening dyspnea and cough found to have rhinovirus infection and bilateral ground glass opacities c/f possible viral vs atypical pneumonia, admitted for AHRF secondary to undifferentiated pneumonia and ACOS exacerbation. Pulmonary consulted for possible bronchoscopy.      #AHRF likely multifactorial 2/2 fluid overload, atelectasis and asthma exacerbation, overall improving  #Asthma-COPD overlap, improving  #Likely viral infection, now superimposed with bacterial pneumonia. Rhinovirus positive        RECOMMENDATIONS:      Completed course of antibiotics with doxycycline and ceftriaxone  Duonebs q4h prn  Continue breo  Completed prednisone 40 mg for a total of five day course  IS and flutter valve. Please encourage patient to use both.  Aggressive diuresis  Wean o2 as able, may try oxymask today  Does not warrant bronchoscopy at the moment  Sputum samples have been contaminated.   Please refer to pulmonary outpatient. Would need PFTs and repeat CT chest non contrast in 4-6 weeks    Thank you for asking us to participate in this patient's care. We will sign off. Please do not hesitate to call back with questions.      Patient seen and discussed with Dr. Perlman.    Gustavo Matos MD   Pulmonary/Critical Care Fellow             HPI/interval: Patient seen and examined, states he is feeling better, afebrile, laying comfortable  in bed. Currently on HFNC with O2 sat ~92%, oxygen requirements coming down. Today he was able to do PT with oxymask at 15 L. Symptoms much improved.    ROS: As per HPI    PMHx/PSHx:  Past Medical History:   Diagnosis Date    Acute midline low back pain without sciatica     AION (acute ischemic optic neuropathy)     Anemia in chronic renal disease     Anxiety     Arthritis 1978    Post transplant    Avascular necrosis of bones of both hips (H)     s/p bilateral hip replacements    Avascular necrosis of bones of both hips (H)     s/p bilateral hip replacements    Basal cell carcinoma     Cataract     Chronic hepatitis B (H)     Chronic osteoarthritis 1978    Post transplant    Clostridium difficile colitis     COPD (chronic obstructive pulmonary disease) (H)     Coronary artery disease involving native coronary artery of native heart without angina pectoris 06/17/2014    Coronary angiogram 6/17/14: Severe distal 3-vessel disease involving small vessels, not amenable to PCI or CABG.    CRP elevated     Depression     Depressive disorder 1978    Post transplant    Dialysis patient     Diverticulosis     Dyslipidemia     Elevated C-reactive protein (CRP)     FSGS (focal segmental glomerulosclerosis)     FSGS (focal segmental glomerulosclerosis)     Gastric ulcer with hemorrhage 02/12/2012    Gastric ulcer with hemorrhage 02/12/2012    GERD (gastroesophageal reflux disease)     Glaucoma     OHTN    Glaucoma     Hemorrhoids     History of blood transfusion     HTN (hypertension)     Hyperlipidemia 1978    Hypertension secondary to other renal disorders     Hypogonadism in male     Immunosuppressed status     Immunosuppression     Kidney replaced by transplant     focal glomerulosclerosis     Kidney transplanted     focal glomerulosclerosis     NSTEMI (non-ST elevated myocardial infarction) (H) 06/17/2014    NSTEMI (non-ST elevated myocardial infarction) (H) 06/17/2014    JULIANE (obstructive sleep apnea)     Doesn't use CPAP     Paracentral scotoma     LE    Renal failure     Secondary renal hyperparathyroidism     Septic bursitis     Squamous cell carcinoma     Uncomplicated asthma 2003    Well controled     Past Surgical History:   Procedure Laterality Date    APPENDECTOMY      ARTHROPLASTY REVISION HIP Right 06/19/2023    Procedure: RIGHT HIP REVISION;  Surgeon: Juan Mcdonough MD;  Location:  OR    BIOPSY      Cardiac Bypass surgery  06/08/2020    [facility]     CATARACT EXTRACTION EXTRACAPSULAR W/ INTRAOCULAR LENS IMPLANTATION Bilateral 4-20-10, 6-1-10    CATARACT IOL, RT/LT  04/19/2000    RE    CATARACT IOL, RT/LT  06/01/2000    LE    COLECTOMY PARTIAL  01/01/1983     10 cm, diverticulitis     COLECTOMY SUBTOTAL  1983    10 cm, diverticulitis    COLONOSCOPY  02/13/2012    Procedure:COLONOSCOPY; Surgeon:SLOAN GALLARDO; Location: GI    COLONOSCOPY N/A 01/22/2020    Procedure: Colonoscopy, With Polypectomy And Biopsy;  Surgeon: Aston Kiran MD;  Location:  GI    COLONOSCOPY N/A 06/05/2025    Procedure: Colonoscopy;  Surgeon: Lina Claros MD;  Location:  GI    CV CORONARY ANGIOGRAM N/A 06/02/2020    Procedure: Coronary Angiogram;  Surgeon: Alona Florentino MD;  Location: [facility];  Service: Cardiology    CV CORONARY ANGIOGRAM N/A 09/20/2021    Procedure: Coronary Angiogram;  Surgeon: Adam Agudelo MD;  Location: [facility] CV    CV LEFT HEART CATHETERIZATION WITHOUT LEFT VENTRICULOGRAM Left 06/02/2020    Procedure: Left Heart Catheterization Without Left Ventriculogram;  Surgeon: Alona Florentino MD;  Location: [facility];  Service: Cardiology    CV SUPRAVALVULAR AORTOGRAM N/A 09/20/2021    Procedure: Supravalvular Aortagram;  Surgeon: Adam Agudelo MD;  Location: [facility] CV    ESOPHAGOSCOPY, GASTROSCOPY, DUODENOSCOPY (EGD), COMBINED  02/13/2012    Procedure:COMBINED ESOPHAGOSCOPY, GASTROSCOPY, DUODENOSCOPY (EGD); Surgeon:SLOAN GALLARDO  ANA BRIONES; Location: GI    ESOPHAGOSCOPY, GASTROSCOPY, DUODENOSCOPY (EGD), COMBINED  02/13/2012    EXTRACAPSULAR CATARACT EXTRATION WITH INTRAOCULAR LENS IMPLANT  4-20-10, 6-1-10    Rt, Lt    HERNIA REPAIR  1995    Lt inguinal    HERNIA REPAIR  1995    Lt inguinal    HIP SURGERY      1981, bilat MITZI, revised 2001, 2005    HIP SURGERY  01/01/1981    bilat MITZI, revised 2001, 2005    IR FINE NEEDLE ASPIRATION W ULTRASOUND  04/10/2023    JOINT REPLACEMENT      KIDNEY SURGERY  1978 and 1993    transplant    KNEE SURGERY  1983, 1987, 2001    bilat TKA    MOHS MICROGRAPHIC PROCEDURE      MOHS MICROGRAPHIC PROCEDURE      ORTHOPEDIC SURGERY      OTHER SURGICAL HISTORY      kidney [id number], 1993    PHACOEMULSIFICATION CLEAR CORNEA WITH STANDARD INTRAOCULAR LENS IMPLANT Right 04/19/2000    PHACOEMULSIFICATION CLEAR CORNEA WITH STANDARD INTRAOCULAR LENS IMPLANT Left 06/01/2000    [place] BOWEL TO BOWEL ANASTOMOSIS      [place] CARDIAC SURG PROCEDURE UNLISTED  2020    3x bypass    [place] PELVIS/HIP JOINT SURGERY UNLISTED  1981, 1996, 2005, 2023    Both hips, L 1x rev. R 2x rev.    [place] STOMACH SURGERY PROCEDURE UNLISTED  1978    Splenectomy    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, auxiliary spleen    TONSILLECTOMY      TRANSPLANT      VASCULAR SURGERY  1976         Outpatient Medications:   Current Facility-Administered Medications   Medication Dose Route Frequency Provider Last Rate Last Admin    acetaminophen (TYLENOL) tablet 650 mg  650 mg Oral Q4H PRN Whalen, Alona, DO   650 mg at 07/15/25 0537    Or    acetaminophen (TYLENOL) Suppository 650 mg  650 mg Rectal Q4H PRN Whalen, Alona, DO        albuterol (PROVENTIL HFA/VENTOLIN HFA) inhaler  2 puff Inhalation Q6H PRN Whalen, Alona, DO        aspirin (ASA) chewable tablet 81 mg  81 mg Oral Daily Whalen, Alona, DO   81 mg at 07/15/25 0919    benzocaine-menthol (CHLORASEPTIC MAX) 15-10 MG lozenge 1 lozenge  1 lozenge Buccal Q1H PRN Whalen, Alona, DO        calcium carbonate (TUMS) chewable tablet 1,000  mg  1,000 mg Oral 4x Daily PRN Whalen, Alona, DO        calcium citrate-vitamin D (CITRACAL) 315-6.25 MG-MCG per tablet 1 tablet  1 tablet Oral Daily with lunch Blake Plasencia MD   1 tablet at 07/14/25 1226    doxycycline hyclate (VIBRAMYCIN) capsule 100 mg  100 mg Oral Q12H [facility] (08/20) Whalen, Alona, DO   100 mg at 07/15/25 0921    enoxaparin ANTICOAGULANT (LOVENOX) injection 40 mg  40 mg Subcutaneous Q24H Whalen, Alona, DO   40 mg at 07/14/25 1555    entecavir (BARACLUDE) tablet 0.5 mg  0.5 mg Oral Daily Whalen, Alona, DO   0.5 mg at 07/15/25 0919    FLUoxetine (PROzac) capsule 60 mg  60 mg Oral Daily Blake Plasencia MD   60 mg at 07/15/25 0919    fluticasone-vilanterol (BREO ELLIPTA) 100-25 MCG/ACT inhaler 1 puff  1 puff Inhalation Daily Whalen, Alona, DO   1 puff at 07/15/25 0919    furosemide (LASIX) injection 40 mg  40 mg Intravenous Once Ryan Isbell MD        guaiFENesin (ROBITUSSIN) 20 mg/mL solution 200 mg  200 mg Oral Q4H PRN Chidi Blackmon MD   200 mg at 07/15/25 0537    hydrOXYzine HCl (ATARAX) tablet 25 mg  25 mg Oral At Bedtime PRN Whalen, Alona, DO        ipratropium - albuterol 0.5 mg/2.5 mg (3mg)/3 mL (DUONEB) neb solution 3 mL  3 mL Nebulization Q4H WA Whalen, Alona, DO   3 mL at 07/15/25 0938    isosorbide mononitrate (IMDUR) 24 hr tablet 60 mg  60 mg Oral Daily Whalen, Alona, DO   60 mg at 07/15/25 0919    lidocaine (LMX4) cream   Topical Q1H PRN Whalen, Alona, DO        lidocaine 1 % 0.1-1 mL  0.1-1 mL Other Q1H PRN Whalen, Alona, DO        melatonin tablet 5 mg  5 mg Oral At Bedtime PRN Whalen, Alona, DO   5 mg at 07/14/25 2037    metoprolol succinate ER (TOPROL-XL) 24 hr half-tab 12.5 mg  12.5 mg Oral Daily Whalen, Alona, DO   12.5 mg at 07/14/25 1940    multivitamin w/minerals (THERA-VIT-M) tablet 1 tablet  1 tablet Oral Daily with lunch Blake Plasencia MD   1 tablet at 07/14/25 1226    mycophenolate (GENERIC EQUIVALENT) capsule 500 mg  500 mg Oral BID IS Lisa Levine APRN CNP   500 mg at 07/15/25  0919    naltrexone (DEPADE/REVIA) tablet 50 mg  50 mg Oral Daily Whalen, Alona, DO   50 mg at 07/15/25 0919    nystatin (MYCOSTATIN) suspension 500,000 Units  500,000 Units Swish & Swallow 4x Daily Blake Plasencia MD   500,000 Units at 07/15/25 0920    pantoprazole (PROTONIX) EC tablet 40 mg  40 mg Oral Daily Whalen, Alona, DO   40 mg at 07/15/25 0921    polyethylene glycol (MIRALAX) Packet 17 g  17 g Oral Daily Kirillumlinda, Michael, DO   17 g at 07/15/25 0918    polyethylene glycol (MIRALAX) Packet 17 g  17 g Oral BID PRN Whalen, Alona, DO        predniSONE (DELTASONE) tablet 5 mg  5 mg Oral Daily Ryan Isbell MD   5 mg at 07/15/25 0919    prochlorperazine (COMPAZINE) injection 10 mg  10 mg Intravenous Q6H PRN Whalen, Alona, DO        Or    prochlorperazine (COMPAZINE) tablet 10 mg  10 mg Oral Q6H PRN Whalen, Alona, DO   10 mg at 07/09/25 1607    psyllium (METAMUCIL/KONSYL) Packet 1 packet  1 packet Oral Daily Blake Plasencia MD   1 packet at 07/14/25 0808    rosuvastatin (CRESTOR) tablet 20 mg  20 mg Oral Daily Whalen, Alona, DO   20 mg at 07/15/25 0920    senna-docusate (SENOKOT-S/PERICOLACE) 8.6-50 MG per tablet 1 tablet  1 tablet Oral BID PRN Whalen, Alona, DO        Or    senna-docusate (SENOKOT-S/PERICOLACE) 8.6-50 MG per tablet 2 tablet  2 tablet Oral BID PRN Whalen, Alona, DO        sodium chloride (PF) 0.9% PF flush 3 mL  3 mL Intracatheter Q8H NE Whalen, Alona, DO   3 mL at 07/14/25 1449    sodium chloride (PF) 0.9% PF flush 3 mL  3 mL Intracatheter q1 min prn Whalen, Alona, DO           Allergies:   Allergies   Allergen Reactions    Penicillins Shortness Of Breath and Hives     Occurred in childhood     Keflex [Cephalexin Hcl] Unknown     Patient could not recall reaction to Keflex  Per chart, has tolerated cefazolin (2023)    Sulfa Antibiotics Rash    Tetracycline Unknown     Patient could not recall reaction, childhood reaction       Family Hx:   Family History   Problem Relation Age of Onset    Asthma Mother     Arthritis  Mother     Cardiovascular Father         AI with valve repair    Hypertension Father     Kidney Disease Father     Other - See Comments Father         AI with valve repair    Hyperlipidemia Father     Heart Disease Father     Anxiety Disorder Maternal Grandmother     Depression Maternal Grandmother     Breast Cancer Maternal Grandmother     Substance Abuse Maternal Grandfather     Cerebrovascular Disease Maternal Grandfather     Other - See Comments Maternal Grandfather         cerebrovascular disease    Depression Maternal Grandfather     Dementia Maternal Grandfather     Heart Disease Maternal Grandfather     Cancer Paternal Grandmother         ovarian ca    Ovarian Cancer Paternal Grandmother     Depression Paternal Grandmother     Uterine Cancer Paternal Grandmother     Cerebrovascular Disease Paternal Grandfather     Dementia Paternal Grandfather     Heart Disease Paternal Grandfather     Kidney Disease Paternal Aunt     Kidney Disease Paternal Aunt     Skin Cancer No family hx of     Glaucoma No family hx of     Macular Degeneration No family hx of     Amblyopia No family hx of     Melanoma No family hx of     Diabetes No family hx of          Physical Exam:   /75 (BP Location: Left arm)   Pulse 79   Temp 98.5  F (36.9  C) (Oral)   Resp 22   Wt 71.8 kg (158 lb 3.2 oz)   SpO2 92%   BMI 24.78 kg/m    - Gen: Aox3, NAD   - CV: RRR, no m/r/g  - Lung: minimal wheezing, improving. Crackles at the bases  - Abd: NTND, +BS  - Ext: No BLE swelling  - Neuro: CN grossly intact     Images/Studies:   CT Chest: 1. Mosaic round glass attenuation with superimposed bronchovascular  groundglass nodules. These findings are nonspecific. Top differential  is ARDS. Differential also includes atypical infection, early diffuse  alveolar hemorrhage, organizing pneumonia or early pulmonary edema.  Recommend pulmonary consultation and if indicated bronchial alveolar  lavage for further differentiation.    CXR: Increase  interstitial opacities, likely fluid overload.    TTE (7/14):  Global and regional left ventricular function is normal with an EF of 55-60%.  Global right ventricular function is mildly reduced.  No hemodynamically significant valve abnormalties.  The inferior vena cava was normal in size with preserved respiratory  variability.  No pericardial effusion.    Labs:   ABG  Recent Labs   Lab 07/09/25  1617   PH 7.36   PCO2 39   PO2 74*   HCO3 22     CBC  Recent Labs   Lab 07/13/25  1114 07/11/25  0716 07/10/25  0936 07/09/25  1038   WBC 11.6* 12.3* 8.7 12.0*   HGB 12.7* 12.1* 13.1* 14.0    252 252 275     BMP  Recent Labs   Lab 07/15/25  0613 07/14/25  1332 07/14/25  0809 07/13/25 1817    135 135 134*   POTASSIUM 3.9 4.0 3.3* 3.4   CHLORIDE 96* 96* 95* 93*   CO2 27 26 28 26   BUN 24.8* 20.9 20.4 14.7   CR 0.68 0.70 0.72 0.66*   GLC 86 226* 86 206*     LFT  Recent Labs   Lab 07/15/25  0613 07/14/25  1332 07/14/25  0809 07/13/25  1817 07/10/25  0936 07/09/25  1038   AST  --   --   --   --   --  49*   ALT  --   --   --   --   --  23   ALKPHOS  --   --   --   --   --  82   BILITOTAL  --   --   --   --   --  1.0   ALBUMIN 3.2* 3.2* 3.2* 3.2*   < > 3.5   INR  --   --   --   --   --  1.03    < > = values in this interval not displayed.     Coagulation Profile  Recent Labs   Lab 07/09/25  1038   INR 1.03

## 2025-07-15 NOTE — PLAN OF CARE
Goal Outcome Evaluation:      Plan of Care Reviewed With: patient    Overall Patient Progress: no changeOverall Patient Progress: no change    Vitals: VSS. Pt HR dropped to 34 and gradually went back up to the 60+. Provider was notified and aware.   Neuros: A & O x4. Able to make needs known.   IV: PIV is saline locked   Resp/trach: High flow 40L FiO2. PRN Robitussin x1 was given to help managed cough.    Diet: Combination Diet low saturated fat NA <2400mg diet, No caffeine diet.   Bowel status: No BM during shift. Last BM was on 07/13/2025. Passing gas.   : Void in urinal   Skin: scattered bruises   Pain: Rate pain as 1-3/10. PRN tylenol x2 was given to help managed pain.   Activity: Standby-1 assist   Plan:  Continue with plan of care and monitor for any changes.

## 2025-07-15 NOTE — PROGRESS NOTES
[facility]  Transplant Nephrology Progress Note  Date of Admission:  7/9/2025  Today's Date: 07/15/2025    Recommendations:   - Agree with 40 mg IV lasix. Goal net negative 1L today. Can also redose with 40 mg IV lasix this afternoon.  - No acute indications for dialysis.  - Would make no changes in immunosuppression.  - Appreciate Pulmonary recommendations.    Assessment & Plan   # DDKT: Stable creatinine, at baseline. Good urine output.     - Baseline Creatinine: ~ 0.7-0.9   - Proteinuria: Normal (<0.2 grams)   - DSA Hx: Not checked recently due to time from transplant   - Last cPRA: unknown%   - BK Viremia: Not checked recently due to time from transplant   - Kidney Tx Biopsy Hx: No biopsy history.    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     # Immunosuppression Prior to Admission: Mycophenolate mofetil (dose 750 mg every 12 hours) and Prednisone (dose 5 mg daily)   - Present Immunosuppression: Mycophenolate mofetil (dose 500 mg every 12 hours) and Prednisone (dose 5 mg daily)    - Induction with Recent Transplant:  Not known due to time from transplant   - Continue with intensive monitoring of immunosuppression for efficacy and toxicity.   - Historical Changes in Immunosuppression: None   - Changes: Not at this time    # Infection Prevention:   Last CD4 Level: Not checked recently  - PJP: None      - CMV IgG Ab High Risk Discordance (D+/R-) at time of transplant: Unknown  Present CMV Serostatus: Unknown  - EBV IgG Ab High Risk Discordance (D+/R-) at time of transplant: Unknown  Present EBV Serostatus: Unknown    # Hypertension: Controlled;  Goal BP: < 140/90 (Hospitalization goal)   - Changes: Not at this time    # Anemia in Chronic Renal Disease: Hgb: Stable      CAROL ANN: No   - Iron studies: Not checked recently    # Mineral Bone Disorder:    - Calcium; level: Normal        On supplement: Yes; calcium citrate 315 mg daily.  - Phosphorus; level: Normal         On supplement: No    # Electrolytes:  - Potassium; level: Normal        On supplement: No  - Magnesium; level: Normal        On supplement: No  - Bicarbonate; level: Normal        On supplement: No  - Sodium; level: Normal    # Acute hypoxic respiratory failure:  # Pulmonary edema:  # Viral versus Atypical Pneumonia: + Rhino/enterovirus. CT PE negative. Gram + Bcx on 07/09, likely contaminant. Repeat blood cultures 07/10. Sputum culture 7/12 likely contaminated, per report. Repeat pending. CXR 7/13 with pulmonary edema. Started on ceftriaxone and currently on prednisone 40 mg daily and doxycycline.   - Management per primary team. Pulmonology following.    # Acute decompensated heart failure: echo in 2023 with no evidence of HF. BNP 1800 on 7/13. PTA on 20 mg lasix PO daily.    - Echo 7/14; EF 55-65% with grade I diastolic dysfunction. No pericardial effusion.   - Management per primary team.    - Will get 40 mg IV lasix today.     # Other Significant PMH:   - CAD, s/p CABG: Last cardiac stress test was abnormal in 6/2023 (but unchanged from prior testing in 2022).  Coronary angiogram 9/2021 that showed some possible obstructive disease in the 1st diagonal, but unable to stent it.  Bypass grafts were open.  Plan is for medical management. Last cardiac echo (8/2023) showed LVEF ~ 60-65%. Normal right ventricular systolic function. Normal diastolic dysfunction.    - Provoked PE (8/2023): after right hip revision surgery. Completed AC.   - Chronic Hepatitis B: Stable on entecavir. Follows here with hepatology.   - Asthma: Some increase in shortness of breath, but felt more cardiac in origin.  Patient is stable on inhalers.   - GERD: Asymptomatic on pantoprazole, but with h/o ulcer, will continue on medication.   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    Seen and discussed with Dr. Bailon.     RONALDO [facility]  Transplant Nephrology    Interval History  Mr. Presley creatinine is 0.68 (07/14 1817); Stable.  Good urine output.  Other significant labs/tests/vitals: VSS. On HiFlo 40L.  No events overnight.  He reports mild cough today with improvement in SOB. No CP.  No leg swelling.  No nausea and vomiting.  Bowel movements are formed.  No fever, sweats or chills.        Review of Systems   4 point ROS was obtained and negative except as noted in the Interval History.    MEDICATIONS:  Current Facility-Administered Medications   Medication Dose Route Frequency Provider Last Rate Last Admin    aspirin (ASA) chewable tablet 81 mg  81 mg Oral Daily Whalen, Alona, DO   81 mg at 07/14/25 0808    calcium citrate-vitamin D (CITRACAL) 315-6.25 MG-MCG per tablet 1 tablet  1 tablet Oral Daily with lunch Blake Plasencia MD   1 tablet at 07/14/25 1226    doxycycline hyclate (VIBRAMYCIN) capsule 100 mg  100 mg Oral Q12H [facility] (08/20) Whalen, Alona, DO   100 mg at 07/14/25 1940    enoxaparin ANTICOAGULANT (LOVENOX) injection 40 mg  40 mg Subcutaneous Q24H Whalen, Alona, DO   40 mg at 07/14/25 1555    entecavir (BARACLUDE) tablet 0.5 mg  0.5 mg Oral Daily Whalen, Alona, DO   0.5 mg at 07/14/25 0810    FLUoxetine (PROzac) capsule 60 mg  60 mg Oral Daily Blake Plasencia MD   60 mg at 07/14/25 0808    fluticasone-vilanterol (BREO ELLIPTA) 100-25 MCG/ACT inhaler 1 puff  1 puff Inhalation Daily Whalen, Alona, DO   1 puff at 07/14/25 1226    ipratropium - albuterol 0.5 mg/2.5 mg (3mg)/3 mL (DUONEB) neb solution 3 mL  3 mL Nebulization Q4H WA Whalen, Alona, DO   3 mL at 07/14/25 2032    isosorbide mononitrate (IMDUR) 24 hr tablet 60 mg  60 mg Oral Daily Alona Whalen DO   60 mg at 07/14/25 0808    metoprolol succinate  ER (TOPROL-XL) 24 hr half-tab 12.5 mg  12.5 mg Oral Daily Whalen, Alona, DO   12.5 mg at 25 1940    multivitamin w/minerals (THERA-VIT-M) tablet 1 tablet  1 tablet Oral Daily with lunch Blake Plasencia MD   1 tablet at 25 1226    mycophenolate (GENERIC EQUIVALENT) capsule 500 mg  500 mg Oral BID IS Lisa Levine APRN CNP   500 mg at 25 1831    naltrexone (DEPADE/REVIA) tablet 50 mg  50 mg Oral Daily Whalen, Alona, DO   50 mg at 25 0809    nystatin (MYCOSTATIN) suspension 500,000 Units  500,000 Units Swish & Swallow 4x Daily Blake Plasencia MD   500,000 Units at 25 1940    pantoprazole (PROTONIX) EC tablet 40 mg  40 mg Oral Daily Whalen, Alona, DO   40 mg at 25 0808    polyethylene glycol (MIRALAX) Packet 17 g  17 g Oral Daily Neo Masor, DO   17 g at 25 0845    [Held by provider] predniSONE (DELTASONE) tablet 5 mg  5 mg Oral Daily Whalen, Alona, DO        psyllium (METAMUCIL/KONSYL) Packet 1 packet  1 packet Oral Daily Blake Plasencia MD   1 packet at 25 0808    rosuvastatin (CRESTOR) tablet 20 mg  20 mg Oral Daily Whalen, Alona, DO   20 mg at 25 0808    sodium chloride (PF) 0.9% PF flush 3 mL  3 mL Intracatheter Q8H NE Whalen, Alona, DO   3 mL at 25 1449     Current Facility-Administered Medications   Medication Dose Route Frequency Provider Last Rate Last Admin       Physical Exam   Temp  Av.3  F (36.8  C)  Min: 97.8  F (36.6  C)  Max: 98.8  F (37.1  C)      Pulse  Av  Min: 63  Max: 80 Resp  Av.9  Min: 18  Max: 44  FiO2 (%)  Av.2 %  Min: 50 %  Max: 65 %  SpO2  Av.2 %  Min: 90 %  Max: 99 %     /77 (BP Location: Left arm)   Pulse 61   Temp 97.7  F (36.5  C) (Oral)   Resp 20   Wt 71.8 kg (158 lb 3.2 oz)   SpO2 (!) 90%   BMI 24.78 kg/m      Admit       GENERAL APPEARANCE: alert and no distress  HENT: mouth without ulcers or lesions  RESP: bilateral wheezing   CV: regular rhythm, normal rate, no rub, no murmur  EDEMA: no LE edema  bilaterally  ABDOMEN: soft, nondistended, nontender  MS: extremities normal - no gross deformities noted, no evidence of inflammation in joints, no muscle tenderness  SKIN: no rash, numerous keratoses  TX KIDNEY: normal  DIALYSIS ACCESS: LUE AV Fistula (No thrill)    Data   All labs reviewed by me.  CMP  Recent Labs   Lab 07/15/25  0613 07/14/25  1332 07/14/25  0809 07/13/25  1817 07/13/25  1114 07/11/25  0716 07/10/25  0936 07/09/25  1038    135 135 134*   < > 137   < > 140   POTASSIUM 3.9 4.0 3.3* 3.4   < > 3.8   < > 3.4   CHLORIDE 96* 96* 95* 93*   < > 103   < > 104   CO2 27 26 28 26   < > 24   < > 22   ANIONGAP 14 13 12 15   < > 10   < > 14   GLC 86 226* 86 206*   < > 144*   < > 85   BUN 24.8* 20.9 20.4 14.7   < > 15.0   < > 12.4   CR 0.68 0.70 0.72 0.66*   < > 0.66*   < > 0.72   GFRESTIMATED >90 >90 >90 >90   < > >90   < > >90   HEMA 9.8 9.7 9.8 9.6   < > 9.2   < > 9.3   MAG 1.9 1.8  --  1.8  --  2.1   < >  --    PHOS 3.2 2.7 3.3 2.9  --  2.5   < >  --    PROTTOTAL  --   --   --   --   --   --   --  6.6   ALBUMIN 3.2* 3.2* 3.2* 3.2*  --  3.1*   < > 3.5   BILITOTAL  --   --   --   --   --   --   --  1.0   ALKPHOS  --   --   --   --   --   --   --  82   AST  --   --   --   --   --   --   --  49*   ALT  --   --   --   --   --   --   --  23    < > = values in this interval not displayed.     CBC  Recent Labs   Lab 07/13/25  1114 07/11/25  0716 07/10/25  0936 07/09/25  1038   HGB 12.7* 12.1* 13.1* 14.0   WBC 11.6* 12.3* 8.7 12.0*   RBC 4.48 4.30* 4.55 4.91   HCT 40.3 37.7* 41.3 44.0   MCV 90 88 91 90   MCH 28.3 28.1 28.8 28.5   MCHC 31.5 32.1 31.7 31.8   RDW 15.2* 15.9* 16.0* 15.9*    252 252 275     INR  Recent Labs   Lab 07/09/25  1038   INR 1.03     ABG  Recent Labs   Lab 07/09/25  1647 07/09/25  1617 07/09/25  1038   PH  --  7.36  --    PCO2  --  39  --    PO2  --  74*  --    HCO3  --  22  --    O2PER 15 4 21      Urine Studies  Recent Labs   Lab Test 07/09/25  1346 08/30/23  1856 08/20/23  1245  08/11/23  1928 06/03/20  1307   COLOR Yellow Yellow Light Yellow Light Yellow Yellow   APPEARANCE Slightly Cloudy* Clear Clear Clear Clear   URINEGLC Negative Negative Negative Negative Negative   URINEBILI Negative Negative Negative Negative Negative   URINEKETONE Trace* 10* 60* 10* Negative   SG 1.015 1.021 1.015 1.009 1.008   UBLD Negative Negative Negative Negative Negative   URINEPH 6.0 5.0 6.0 6.0 6.5   PROTEIN 20* 10* 20* 10* Negative   UROBILINOGEN  --   --   --   --  <2.0 E.U./dL   NITRITE Negative Negative Negative Negative Negative   LEUKEST Negative Negative Negative Negative Negative   RBCU 1 <1 <1 0  --    WBCU 7* 3 1 3  --      Recent Labs   Lab Test 10/28/20  0902 10/01/19  0942 04/02/19  0917 10/23/18  1122 06/21/18  1209   UTPG 0.23* 0.26* 0.23* 0.21* 0.12     PTH  No lab results found.  Iron Studies  Recent Labs   Lab Test 10/02/23  0943   IRON 30*      IRONSAT 10*   LA 50       IMAGING:  All imaging studies reviewed by me.

## 2025-07-15 NOTE — PLAN OF CARE
Goal Outcome Evaluation:      Plan of Care Reviewed With: patient    Overall Patient Progress: no changeOverall Patient Progress: no change    Outcome Evaluation: Pt denies pain    /75 (BP Location: Left arm)   Pulse 74   Temp 97.6  F (36.4  C) (Oral)   Resp 24   Wt 71.8 kg (158 lb 3.2 oz)   SpO2 96%   BMI 24.78 kg/m      Shift: 8335-4859  Isolation Status: Droplet for Rhinovirus  VS: Stable on 40L High Flow NC, afebrile  Neuro: Aox4  Behaviors: Calm and cooperative  BG: None  Labs: RN managed K+, Mag & Phos, Cr: 0.68, K+: 3.9, Ma.9, Phos: 3.2, Glu: 86  Respiratory: Course and diminished lung sounds, dry & nonproductive cough, Pt using IS throughout the day  Cardiac: WDL  Pain/Nausea: Denies pain and nausea  PRN: Robitussin x1, Tylenol x2  Diet: Low saturated fat Na< 2400mg diet, no caffeine diet   IV Access: R PIV SL  Infusion(s): None  Lines/Drains: None  GI/: Voids spontaneously without difficulty, LBL   Skin: WDL  Mobility: Ax1  Plan: Continue POC

## 2025-07-15 NOTE — PLAN OF CARE
Goal Outcome Evaluation:      Plan of Care Reviewed With: patient    Overall Patient Progress: no change

## 2025-07-15 NOTE — PROGRESS NOTES
[facility]    Progress Note - Medicine Service, MAROON TEAM 2       Date of Admission:  7/9/2025    Assessment & Plan   65 year old male with PMHx of NSTEMI, CAD s/p CABG (2020), possible COPD, on chronic immunosuppression s/p renal transplant x2 presenting with worsening dyspnea and cough found to have positive resp PCR panel for Human Rhin/Enterovirus with bilateral ground glass opacities c/f possible viral vs atypical pneumonia, admitted for AHRF secondary to undifferentiated pneumonia. Initially, treated for possible CAP and COPD exacerbation with IV antibiotics, steroids and duonebs. Now with increased pulmonary edema on CXR and elevated BNP, consistent with acute decompensated HFpEF being treated with IV lasix diuresis.     Changes Today:  - IV diuresis Lasix 40mg x 2.  - Completed course of antibiotics with doxycycline and ceftriaxone  - Completed prednisone 40 mg for a total of five day course    #AHRF  #Acute decompensated heart failure  #C/f viral vs atypical pneumonia  Patient with acute hypoxemic respiratory failure and 3-day history of worsening dyspnea and nonproductive cough, requiring BiPAP on arrival. Initial concern for viral or atypical pneumonia given mild leukocytosis, elevated inflammatory markers, and CT chest showing bilateral ground glass opacities. RPP positive for rhinovirus/enterovirus, but this alone unlikely to cause severe lower respiratory tract disease. Urine antigens for Strep pneumo and Legionella negative. Treated empirically with IV ceftriaxone and doxycycline for 5 days without clinical improvement. Blood cultures initially grew MRSE, likely contaminant; repeat cultures negative.    Persistent hypoxia out of proportion to typical CAP or COPD raised concern for alternative or concurrent cardiogenic cause. CXR showed worsening pulmonary edema; BNP elevated (1800). ECHO on 7/14 showed preserved LVEF (55-60%) with mild RV  dysfunction, likely secondary to bilateral infiltrates or chronic lung disease. Findings consistent with HFpEF with acute pulmonary edema. Diuresis initiated with IV furosemide; monitoring electrolytes, fluid balance, and I/Os. Consider initiating SGLT2 inhibitor (e.g., empagliflozin) once adequately diuresed and hemodynamically stable to reduce symptoms and prevent rehospitalization (Charlie et al., [facility] 2021).    Downtrending CRP from 181 (7/9) to 135 (7/14) suggests improving inflammation, though lack of clinical improvement may be due to concurrent HFpEF-related pulmonary edema. Diuresis 40 mg IV lasix showed sub optimal response around 800 mL.Important to consider that bilateral viral pneumonia can cause pulmonary edema that mimics cardiogenic edema.     Plan:  -IV diuresis with close monitoring of electrolytes. Strict I&Os.  -Completed IV Ceftriaxone (7/9-7/13) and PO Doxycycline (7/11-7/15) course.  -Continue duonebs q4h while awake  -Solumedrol 125mg IV 7/9, prednisone 40mg PO daily (7/10 - 7/15).  -Pulm consult  Continue antibiotics above (azithromycin stopped due to prolonged Qtc)  Duonebs q4h while awake (ordered)  Continue breo (ordered)  Methylpred 125 mg IV x1, prednisone 40 mg (7/10 - 7/15 )  Does not warrant bronchoscopy at the moment  -Work up thus far:   Respiratory viral panel (positive for rhino/entero)  COVID, Influenza (negative)  Sputum culture 7/9 contaminated; will try for repeat collection 7/12   Urine histo and blasto ab, ag (negative)  Urine strep and legionella (negative)  B d glucan and aspergillosis (negative)  Procalcitonin 0.16  Repeat procalcitonin 0.06  MRSA swab (negative)    #Acute decompensated heart failure  #HFpEF (LVEF 55-60%)  #Hx of CAD s/p CABG  #Hx NSTEMI  NSTEMI in 2014. Previous ECHO from 8/2023 with LVEF of 60-65%.   Concern for acute decompensated heart failure with preserved EF (HFpEF) given likely pulmonary edema seen on CXR and increased BNP. Initiated IV  diuresis.  7/14 ECHO with normal global and regional left ventricular function with an LVEF of 55-60%.  Global right ventricular function is mildly reduced. No hemodynamically significant valve abnormalties. The inferior vena cava was normal in size with preserved respiratory variability with no pericardial effusion. Compared to study in 2023, global RV function is slightly lower. Findings consistent with HFpEF.     Plan:  -Continue IV diuresis lasix 40 mg with close monitoring of urine output and electrolytes.  -Goal of net -1L urine output/day, repeat at 1400 if <1 liter output.  -Per nephro: Can restart PTA lasix 20 mg PO daily tomorrow.  -PTA Aspirin 81 mg daily  -Holding Nitroglycerin 0.4mg due to patient not taking                #COPD exacerbation   Reviewed PFTs from 4/2018 which showed moderate restriction with FVC 63%. Reports a 5 pack year smoking history. Unclear if patient has obstructive lung disease. Diagnosis of COPD appears to come from inaccurate health form filled out by patient years ago. Ideally get updated PFTs and DCLO testing. Interestingly he has had improvement with Bipap, but did not have evidence of CO2 retention on gases suggesting less obstructive pathology.      Plan:  -Incentive spirometry   -steroids, antibiotics as above   -Duonebs q4h while awake  -Will continue PTA COPD inhalers for now  -PTA Breo Ellipta 1 puff/day  -PTA albuterol PRN     #Hx of renal transplant   #chronic immunosuppression  Hx of renal failure on dialysis at age 16, and kidney transplant, infected with hepatitis B from dialysis.     Plan:  Reduce PTA Mycophenolate 500mg BID (decreased dose for 5 days starting 7/10-7/14*, per nephrology)  Holding PTA Prednisone 5mg daily while on higher dose      #Oral Thrush   Pt noticed oral thrush and observed on buccal mucosa and tongue.  Ordered Nystatin.    #Lactic acidosis, resolved    Likely secondary to severe infection in setting of AHRF. LA on admission 3.3 --> 2.5 -->  1.4.      #Elevated troponins  #Type 2 demand ischemia   Likely secondary to demand ischemia in setting of severe infection and respiratory failure. Without chest pain or signs of fluid overload on exam. EKG without acute ischemic changes. POC ECHO without pericardial effusion and normal appearing ejection fraction. 7/14 ECHO with normal global and regional left ventricular function with an LVEF of 55-60%.    #HTN     Plan:  PTA Metoprolol succinate 12.5mg daily  PTA Imdur 60 mg daily    #HLD     Plan:  -rosuvastatin 20mg daily     #Chronic hepatitis B     Plan:  -entecavir 0.5mg daily     #GERD     Plan:  -Pantoprazole 40mg daily     #KIM     Plan:  PTA Atarax 10-25mg PRN  PTA Fluoxetine 20mg daily  PTA Fluoxetine 40mg daily        Diet: Combination Diet Low Saturated Fat Na <2400mg Diet, No Caffeine Diet    DVT Prophylaxis: Enoxaparin (Lovenox) SQ  [place] Catheter: Not present  Fluids: none  Lines: None     Cardiac Monitoring: None  Code Status: No CPR- Do NOT Intubate      Clinically Significant Risk Factors        # Hypokalemia: Lowest K = 3.3 mmol/L in last 2 days, will replace as needed  # Hyponatremia: Lowest Na = 134 mmol/L in last 2 days, will monitor as appropriate  # Hypochloremia: Lowest Cl = 93 mmol/L in last 2 days, will monitor as appropriate      # Hypoalbuminemia: Lowest albumin = 3.1 g/dL at 7/11/2025  7:16 AM, will monitor as appropriate     # Hypertension: Noted on problem list                       Social Drivers of Health   Tobacco Use: Medium Risk (6/30/2025)    Patient History     Smoking Tobacco Use: Former     Smokeless Tobacco Use: Former     Passive Exposure: Never   Physical Activity: Insufficiently Active (11/5/2024)    Exercise Vital Sign     Days of Exercise per Week: 5 days     Minutes of Exercise per Session: 20 min   Stress: Stress Concern Present (11/5/2024)    [employer] - Occupational Stress Questionnaire     Feeling of Stress : To some extent    Social Connections: Unknown (11/5/2024)    Social Connection and Isolation Panel [NHANES]     Frequency of Social Gatherings with Friends and Family: More than three times a week         Disposition Plan     Medically Ready for Discharge: Anticipated in 2-4 Days       The patient's care was discussed with the Attending Physician, Dr. Plasencia.    [facility]  Medicine Service, [address]  Securely message with Vocera (more info)  Text page via [facility] Paging/Directory   See signed in provider for up to date coverage information    .Resident/Fellow Attestation   I, Alona Whalen DO, was present with the medical/ALISHA student who participated in the service and in the documentation of the note.  I have verified the history and personally performed the physical exam and medical decision making.  I agree with the assessment and plan of care as documented in the note.      Alona Whalen DO  PGY1  Date of Service (when I saw the patient): 07/15/25    ______________________________________________________________________    Interval History   No acute events overnight. This morning, Ander is feeling mildly better after lasix. Was able to sleep some.    Physical Exam   Vital Signs: Temp: 98.5  F (36.9  C) Temp src: Oral BP: 128/75 Pulse: 79   Resp: 22 SpO2: 92 % O2 Device: High Flow Nasal Cannula (HFNC) Oxygen Delivery: 40 LPM  Weight: 158 lbs 3.2 oz    .Physical Exam  Vitals reviewed.   HENT:      Head: Normocephalic and atraumatic.      Mouth/Throat:      Comments: Oral thrush on buccal mucosa  Cardiovascular:      Rate and Rhythm: Normal rate and regular rhythm.   Pulmonary:      Effort: Tachypnea and respiratory distress present.      Breath sounds: Wheezing and rhonchi present.      Comments: Wheezing and rhonchi present bilaterally in lower and middle lung fields.  Abdominal:      General: Abdomen is flat.      Palpations: Abdomen is soft.   Musculoskeletal:       Right lower leg: No edema.      Left lower leg: No edema.   Neurological:      Mental Status: He is alert.       Medical Decision Making       Please see A&P for additional details of medical decision making.      Data     I have personally reviewed the following data over the past 24 hrs:    N/A  \   N/A   / N/A     137 96 (L) 24.8 (H) /  86   3.9 27 0.68 \     ALT: N/A AST: N/A AP: N/A TBILI: N/A   ALB: 3.2 (L) TOT PROTEIN: N/A LIPASE: N/A     Procal: N/A CRP: N/A Lactic Acid: N/A         Imaging results reviewed over the past 24 hrs:   No results found for this or any previous visit (from the past 24 hours).

## 2025-07-16 ENCOUNTER — APPOINTMENT (OUTPATIENT)
Dept: PHYSICAL THERAPY | Facility: CLINIC | Age: 65
DRG: 193 | End: 2025-07-16
Payer: COMMERCIAL

## 2025-07-16 ENCOUNTER — APPOINTMENT (OUTPATIENT)
Dept: GENERAL RADIOLOGY | Facility: CLINIC | Age: 65
DRG: 193 | End: 2025-07-16
Payer: COMMERCIAL

## 2025-07-16 LAB
ALBUMIN SERPL BCG-MCNC: 3.2 G/DL (ref 3.5–5.2)
ANION GAP SERPL CALCULATED.3IONS-SCNC: 13 MMOL/L (ref 7–15)
BUN SERPL-MCNC: 23.2 MG/DL (ref 8–23)
CALCIUM SERPL-MCNC: 9.8 MG/DL (ref 8.8–10.4)
CHLORIDE SERPL-SCNC: 93 MMOL/L (ref 98–107)
CREAT SERPL-MCNC: 0.68 MG/DL (ref 0.67–1.17)
EGFRCR SERPLBLD CKD-EPI 2021: >90 ML/MIN/1.73M2
GLUCOSE SERPL-MCNC: 85 MG/DL (ref 70–99)
HCO3 SERPL-SCNC: 29 MMOL/L (ref 22–29)
MAGNESIUM SERPL-MCNC: 1.8 MG/DL (ref 1.7–2.3)
PHOSPHATE SERPL-MCNC: 3.6 MG/DL (ref 2.5–4.5)
POTASSIUM SERPL-SCNC: 3.5 MMOL/L (ref 3.4–5.3)
SODIUM SERPL-SCNC: 135 MMOL/L (ref 135–145)

## 2025-07-16 PROCEDURE — 250N000011 HC RX IP 250 OP 636

## 2025-07-16 PROCEDURE — 94640 AIRWAY INHALATION TREATMENT: CPT | Mod: 76

## 2025-07-16 PROCEDURE — 83735 ASSAY OF MAGNESIUM: CPT | Performed by: STUDENT IN AN ORGANIZED HEALTH CARE EDUCATION/TRAINING PROGRAM

## 2025-07-16 PROCEDURE — 250N000012 HC RX MED GY IP 250 OP 636 PS 637

## 2025-07-16 PROCEDURE — 94640 AIRWAY INHALATION TREATMENT: CPT

## 2025-07-16 PROCEDURE — 250N000012 HC RX MED GY IP 250 OP 636 PS 637: Performed by: NURSE PRACTITIONER

## 2025-07-16 PROCEDURE — 36415 COLL VENOUS BLD VENIPUNCTURE: CPT | Performed by: STUDENT IN AN ORGANIZED HEALTH CARE EDUCATION/TRAINING PROGRAM

## 2025-07-16 PROCEDURE — 250N000013 HC RX MED GY IP 250 OP 250 PS 637

## 2025-07-16 PROCEDURE — 999N000157 HC STATISTIC RCP TIME EA 10 MIN

## 2025-07-16 PROCEDURE — 250N000013 HC RX MED GY IP 250 OP 250 PS 637: Performed by: STUDENT IN AN ORGANIZED HEALTH CARE EDUCATION/TRAINING PROGRAM

## 2025-07-16 PROCEDURE — 71045 X-RAY EXAM CHEST 1 VIEW: CPT

## 2025-07-16 PROCEDURE — 71045 X-RAY EXAM CHEST 1 VIEW: CPT | Mod: 26 | Performed by: RADIOLOGY

## 2025-07-16 PROCEDURE — 97110 THERAPEUTIC EXERCISES: CPT | Mod: GP

## 2025-07-16 PROCEDURE — 120N000011 HC R&B TRANSPLANT UMMC

## 2025-07-16 PROCEDURE — 999N000215 HC STATISTIC HFNC ADULT NON-CPAP

## 2025-07-16 PROCEDURE — 80069 RENAL FUNCTION PANEL: CPT

## 2025-07-16 PROCEDURE — 250N000009 HC RX 250

## 2025-07-16 PROCEDURE — 99233 SBSQ HOSP IP/OBS HIGH 50: CPT | Mod: GC | Performed by: STUDENT IN AN ORGANIZED HEALTH CARE EDUCATION/TRAINING PROGRAM

## 2025-07-16 PROCEDURE — 99232 SBSQ HOSP IP/OBS MODERATE 35: CPT | Performed by: NURSE PRACTITIONER

## 2025-07-16 PROCEDURE — 97530 THERAPEUTIC ACTIVITIES: CPT | Mod: GP

## 2025-07-16 RX ORDER — POTASSIUM CHLORIDE 750 MG/1
20 TABLET, EXTENDED RELEASE ORAL ONCE
Status: COMPLETED | OUTPATIENT
Start: 2025-07-16 | End: 2025-07-16

## 2025-07-16 RX ORDER — MAGNESIUM OXIDE 400 MG/1
400 TABLET ORAL EVERY 4 HOURS
Status: COMPLETED | OUTPATIENT
Start: 2025-07-16 | End: 2025-07-16

## 2025-07-16 RX ORDER — FUROSEMIDE 10 MG/ML
60 INJECTION INTRAMUSCULAR; INTRAVENOUS ONCE
Status: DISCONTINUED | OUTPATIENT
Start: 2025-07-16 | End: 2025-07-16

## 2025-07-16 RX ADMIN — IPRATROPIUM BROMIDE AND ALBUTEROL SULFATE 3 ML: .5; 3 SOLUTION RESPIRATORY (INHALATION) at 20:11

## 2025-07-16 RX ADMIN — ENOXAPARIN SODIUM 40 MG: 40 INJECTION SUBCUTANEOUS at 16:10

## 2025-07-16 RX ADMIN — ASPIRIN 81 MG CHEWABLE TABLET 81 MG: 81 TABLET CHEWABLE at 08:56

## 2025-07-16 RX ADMIN — NYSTATIN 500000 UNITS: 100000 SUSPENSION ORAL at 16:10

## 2025-07-16 RX ADMIN — Medication 1 LOZENGE: at 16:21

## 2025-07-16 RX ADMIN — MAGNESIUM OXIDE TAB 400 MG (241.3 MG ELEMENTAL MG) 400 MG: 400 (241.3 MG) TAB at 12:28

## 2025-07-16 RX ADMIN — MYCOPHENOLATE MOFETIL 500 MG: 250 CAPSULE ORAL at 08:56

## 2025-07-16 RX ADMIN — Medication 1 TABLET: at 12:27

## 2025-07-16 RX ADMIN — MYCOPHENOLATE MOFETIL 500 MG: 250 CAPSULE ORAL at 18:15

## 2025-07-16 RX ADMIN — FLUOXETINE HYDROCHLORIDE 60 MG: 40 CAPSULE ORAL at 08:56

## 2025-07-16 RX ADMIN — IPRATROPIUM BROMIDE AND ALBUTEROL SULFATE 3 ML: .5; 3 SOLUTION RESPIRATORY (INHALATION) at 16:30

## 2025-07-16 RX ADMIN — POTASSIUM CHLORIDE 20 MEQ: 750 TABLET, EXTENDED RELEASE ORAL at 12:29

## 2025-07-16 RX ADMIN — POLYETHYLENE GLYCOL 3350 17 G: 17 POWDER, FOR SOLUTION ORAL at 08:56

## 2025-07-16 RX ADMIN — Medication 5 MG: at 22:39

## 2025-07-16 RX ADMIN — PREDNISONE 5 MG: 5 TABLET ORAL at 08:56

## 2025-07-16 RX ADMIN — IPRATROPIUM BROMIDE AND ALBUTEROL SULFATE 3 ML: .5; 3 SOLUTION RESPIRATORY (INHALATION) at 12:21

## 2025-07-16 RX ADMIN — NYSTATIN 500000 UNITS: 100000 SUSPENSION ORAL at 08:55

## 2025-07-16 RX ADMIN — NYSTATIN 500000 UNITS: 100000 SUSPENSION ORAL at 12:27

## 2025-07-16 RX ADMIN — GUAIFENESIN 200 MG: 200 SOLUTION ORAL at 21:00

## 2025-07-16 RX ADMIN — GUAIFENESIN 200 MG: 200 SOLUTION ORAL at 16:21

## 2025-07-16 RX ADMIN — PANTOPRAZOLE SODIUM 40 MG: 40 TABLET, DELAYED RELEASE ORAL at 08:57

## 2025-07-16 RX ADMIN — NYSTATIN 500000 UNITS: 100000 SUSPENSION ORAL at 20:00

## 2025-07-16 RX ADMIN — ENTECAVIR 0.5 MG: 0.5 TABLET ORAL at 08:56

## 2025-07-16 RX ADMIN — Medication 12.5 MG: at 20:00

## 2025-07-16 RX ADMIN — FLUTICASONE FUROATE AND VILANTEROL TRIFENATATE 1 PUFF: 100; 25 POWDER RESPIRATORY (INHALATION) at 08:57

## 2025-07-16 RX ADMIN — MAGNESIUM OXIDE TAB 400 MG (241.3 MG ELEMENTAL MG) 400 MG: 400 (241.3 MG) TAB at 16:10

## 2025-07-16 RX ADMIN — ROSUVASTATIN CALCIUM 20 MG: 20 TABLET, FILM COATED ORAL at 08:56

## 2025-07-16 RX ADMIN — GUAIFENESIN 200 MG: 200 SOLUTION ORAL at 00:55

## 2025-07-16 RX ADMIN — PROCHLORPERAZINE EDISYLATE 10 MG: 5 INJECTION INTRAMUSCULAR; INTRAVENOUS at 00:33

## 2025-07-16 RX ADMIN — NALTREXONE HYDROCHLORIDE 50 MG: 50 TABLET, FILM COATED ORAL at 08:57

## 2025-07-16 RX ADMIN — IPRATROPIUM BROMIDE AND ALBUTEROL SULFATE 3 ML: .5; 3 SOLUTION RESPIRATORY (INHALATION) at 08:01

## 2025-07-16 RX ADMIN — ISOSORBIDE MONONITRATE 60 MG: 60 TABLET, EXTENDED RELEASE ORAL at 08:57

## 2025-07-16 RX ADMIN — Medication 5 MG: at 00:38

## 2025-07-16 ASSESSMENT — ACTIVITIES OF DAILY LIVING (ADL)
ADLS_ACUITY_SCORE: 47

## 2025-07-16 NOTE — PROGRESS NOTES
CLINICAL NUTRITION SERVICES    Reviewed nutrition risk factors due to LOS. No concerns with oral intake per pt/RN/[employer] (room service meal ordering system). Reviewed wt hx. No unintentional wt loss. No indication of pressure injury.    Follow Up / Monitoring:   Per policy unless consulted    Ida Naidu, MS, RD, LD, CCTD, [facility]  7A + 7B (beds [id number])  Available on Vocera [7A or 7B Clinical Dietitian]   Weekend/Holiday: Vocera [Weekend Clinical Dietitian]

## 2025-07-16 NOTE — PROGRESS NOTES
Pt refused sputum induction stating he won't be able to produce any sputum. He stated he tried couple of times already.

## 2025-07-16 NOTE — PROGRESS NOTES
[facility]  Transplant Nephrology Progress Note  Date of Admission:  7/9/2025  Today's Date: 07/16/2025    Recommendations:   - Would hold off on additional diuretics at this time given episode of orthostatic hypotension while working with PT. Likely he has reached dry weight. Will continue to assess for diuretic needs daily.   - No acute indications for dialysis.  - Would make no changes in immunosuppression.  - Appreciate Pulmonary recommendations.    Assessment & Plan   # DDKT: Stable creatinine, at baseline. Good urine output.     - Baseline Creatinine: ~ 0.7-0.9   - Proteinuria: Normal (<0.2 grams)   - DSA Hx: Not checked recently due to time from transplant   - Last cPRA: unknown%   - BK Viremia: Not checked recently due to time from transplant   - Kidney Tx Biopsy Hx: No biopsy history.    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     # Immunosuppression Prior to Admission: Mycophenolate mofetil (dose 750 mg every 12 hours) and Prednisone (dose 5 mg daily)   - Present Immunosuppression: Mycophenolate mofetil (dose 500 mg every 12 hours) and Prednisone (dose 5 mg daily)    - Induction with Recent Transplant:  Not known due to time from transplant   - Continue with intensive monitoring of immunosuppression for efficacy and toxicity.   - Historical Changes in Immunosuppression: None   - Changes: Not at this time    # Infection Prevention:   Last CD4 Level: Not checked recently  - PJP: None      - CMV IgG Ab High Risk Discordance (D+/R-) at time of transplant: Unknown  Present CMV Serostatus: Unknown  - EBV IgG Ab High Risk Discordance (D+/R-) at time of transplant: Unknown  Present EBV Serostatus: Unknown    # Hypertension: Controlled;  Goal BP: < 140/90 (Hospitalization goal)   - Changes: Not at this time    # Anemia in Chronic Renal Disease: Hgb: Stable      CAROL ANN: No   - Iron studies: Not checked recently    # Mineral Bone Disorder:    - Calcium;  level: Normal        On supplement: Yes; calcium citrate 315 mg daily.  - Phosphorus; level: Normal        On supplement: No    # Electrolytes:  - Potassium; level: Normal        On supplement: No  - Magnesium; level: Normal        On supplement: No  - Bicarbonate; level: Normal        On supplement: No  - Sodium; level: Normal    # Acute hypoxic respiratory failure:  # Pulmonary edema:  # Viral versus Atypical Pneumonia: + Rhino/enterovirus. CT PE negative. Gram + Bcx on 07/09, likely contaminant. Repeat blood cultures 07/10. Sputum culture 7/12 likely contaminated, per report. Repeat pending. CXR 7/13 with pulmonary edema. Started on ceftriaxone and currently on prednisone 40 mg daily and doxycycline.   - Management per primary team. Pulmonology following.    # Acute decompensated heart failure: echo in 2023 with no evidence of HF. BNP 1800 on 7/13. PTA on 20 mg lasix PO daily.    - Echo 7/14; EF 55-65% with grade I diastolic dysfunction. No pericardial effusion.   - Management per primary team.    - Will get 40 mg IV lasix today.     # Other Significant PMH:   - CAD, s/p CABG: Last cardiac stress test was abnormal in 6/2023 (but unchanged from prior testing in 2022).  Coronary angiogram 9/2021 that showed some possible obstructive disease in the 1st diagonal, but unable to stent it.  Bypass grafts were open.  Plan is for medical management. Last cardiac echo (8/2023) showed LVEF ~ 60-65%. Normal right ventricular systolic function. Normal diastolic dysfunction.    - Provoked PE (8/2023): after right hip revision surgery. Completed AC.   - Chronic Hepatitis B: Stable on entecavir. Follows here with hepatology.   - Asthma: Some increase in shortness of breath, but felt more cardiac in origin.  Patient is stable on inhalers.   - GERD: Asymptomatic on pantoprazole, but with h/o ulcer, will continue on medication.   - Skin Cancer: SCCIS, right lateral chest, s/p bx 5/9/24, s/p MMS on 8/5/24     # Transplant  History:  Etiology of Kidney Failure: Focal segmental glomerulosclerosis (FSGS)  Tx: DDKT  Transplant: 10/6/1993 (Kidney), 4/18/1978 (Kidney)  Significant transplant-related complications: Recurrent Skin Cancers    Recommendations were communicated to the primary team via Vocera.    Seen and discussed with Dr. Bailon.     Yee Ribeiro, [facility]  Transplant Nephrology    Interval History  Mr. Burgesss creatinine is 0.68 (07/16 1817); Stable.  Good urine output.  Other significant labs/tests/vitals: VSS. On HiFlo 40L.  No events overnight.  He reports mild cough today with improvement in SOB. No CP.  No leg swelling.  No nausea and vomiting.  Bowel movements are formed.  No fever, sweats or chills.        Review of Systems   4 point ROS was obtained and negative except as noted in the Interval History.    MEDICATIONS:  Current Facility-Administered Medications   Medication Dose Route Frequency Provider Last Rate Last Admin    aspirin (ASA) chewable tablet 81 mg  81 mg Oral Daily Whalen, Alona, DO   81 mg at 07/15/25 0919    calcium citrate-vitamin D (CITRACAL) 315-6.25 MG-MCG per tablet 1 tablet  1 tablet Oral Daily with lunch Blake Plasencia MD   1 tablet at 07/15/25 1107    enoxaparin ANTICOAGULANT (LOVENOX) injection 40 mg  40 mg Subcutaneous Q24H Whalen, Alona, DO   40 mg at 07/15/25 1634    entecavir (BARACLUDE) tablet 0.5 mg  0.5 mg Oral Daily Whalen, Alona, DO   0.5 mg at 07/15/25 0919    FLUoxetine (PROzac) capsule 60 mg  60 mg Oral Daily Blake Plasencia MD   60 mg at 07/15/25 0919    fluticasone-vilanterol (BREO ELLIPTA) 100-25 MCG/ACT inhaler 1 puff  1 puff Inhalation Daily Whalen, Alona, DO   1 puff at 07/15/25 0919    ipratropium - albuterol 0.5 mg/2.5 mg (3mg)/3 mL (DUONEB) neb solution 3 mL  3 mL Nebulization Q4H WA Whalen, Alona, DO   3 mL at 07/16/25 0801    isosorbide mononitrate (IMDUR) 24 hr tablet 60 mg  60 mg Oral Daily Whalen, Alona, DO   60 mg at 07/15/25 0919    metoprolol succinate ER (TOPROL-XL) 24 hr  half-tab 12.5 mg  12.5 mg Oral Daily Whalen, Alona, DO   12.5 mg at 07/15/25 1951    multivitamin w/minerals (THERA-VIT-M) tablet 1 tablet  1 tablet Oral Daily with lunch Blake Plasencia MD   1 tablet at 07/15/25 1107    mycophenolate (GENERIC EQUIVALENT) capsule 500 mg  500 mg Oral BID IS Lisa Levine APRN CNP   500 mg at 07/15/25 181    naltrexone (DEPADE/REVIA) tablet 50 mg  50 mg Oral Daily Whalen, Alona, DO   50 mg at 07/15/25 0919    nystatin (MYCOSTATIN) suspension 500,000 Units  500,000 Units Swish & Swallow 4x Daily Blake Plasencia MD   500,000 Units at 07/15/25 1951    pantoprazole (PROTONIX) EC tablet 40 mg  40 mg Oral Daily Whalen, Alona, DO   40 mg at 07/15/25 0921    polyethylene glycol (MIRALAX) Packet 17 g  17 g Oral Daily Michael Mas DO   17 g at 07/15/25 0918    predniSONE (DELTASONE) tablet 5 mg  5 mg Oral Daily Ryan Isbell MD   5 mg at 07/15/25 0919    psyllium (METAMUCIL/KONSYL) Packet 1 packet  1 packet Oral Daily Blake Plasencia MD   1 packet at 25 0808    rosuvastatin (CRESTOR) tablet 20 mg  20 mg Oral Daily Whalen, Alona, DO   20 mg at 07/15/25 0920    sodium chloride (PF) 0.9% PF flush 3 mL  3 mL Intracatheter Q8H NE Whalen, Alona, DO   3 mL at 07/15/25 1302     Current Facility-Administered Medications   Medication Dose Route Frequency Provider Last Rate Last Admin       Physical Exam   Temp  Av.3  F (36.8  C)  Min: 97.8  F (36.6  C)  Max: 98.8  F (37.1  C)      Pulse  Av  Min: 63  Max: 80 Resp  Av.9  Min: 18  Max: 44  FiO2 (%)  Av.2 %  Min: 50 %  Max: 65 %  SpO2  Av.2 %  Min: 90 %  Max: 99 %     /74 (BP Location: Left arm)   Pulse 80   Temp 98.4  F (36.9  C) (Oral)   Resp 20   Wt 71 kg (156 lb 9.6 oz)   SpO2 95%   BMI 24.53 kg/m      Admit       GENERAL APPEARANCE: alert and no distress  HENT: mouth without ulcers or lesions  RESP: bilateral crackles  CV: regular rhythm, normal rate, no rub, no murmur  EDEMA: no LE edema  bilaterally  ABDOMEN: soft, nondistended, nontender  MS: extremities normal - no gross deformities noted, no evidence of inflammation in joints, no muscle tenderness  SKIN: no rash, numerous keratoses  TX KIDNEY: normal  DIALYSIS ACCESS: LUE AV Fistula (No thrill)    Data   All labs reviewed by me.  CMP  Recent Labs   Lab 07/16/25  0533 07/15/25  1541 07/15/25  0613 07/14/25  1332 07/14/25  0809 07/10/25  0936 07/09/25  1038     --  137 135 135   < > 140   POTASSIUM 3.5 3.6 3.9 4.0 3.3*   < > 3.4   CHLORIDE 93*  --  96* 96* 95*   < > 104   CO2 29  --  27 26 28   < > 22   ANIONGAP 13  --  14 13 12   < > 14   GLC 85  --  86 226* 86   < > 85   BUN 23.2*  --  24.8* 20.9 20.4   < > 12.4   CR 0.68  --  0.68 0.70 0.72   < > 0.72   GFRESTIMATED >90  --  >90 >90 >90   < > >90   HEMA 9.8  --  9.8 9.7 9.8   < > 9.3   MAG 1.8 1.7 1.9 1.8  --    < >  --    PHOS 3.6 4.0 3.2 2.7 3.3   < >  --    PROTTOTAL  --   --   --   --   --   --  6.6   ALBUMIN 3.2*  --  3.2* 3.2* 3.2*   < > 3.5   BILITOTAL  --   --   --   --   --   --  1.0   ALKPHOS  --   --   --   --   --   --  82   AST  --   --   --   --   --   --  49*   ALT  --   --   --   --   --   --  23    < > = values in this interval not displayed.     CBC  Recent Labs   Lab 07/13/25  1114 07/11/25  0716 07/10/25  0936 07/09/25  1038   HGB 12.7* 12.1* 13.1* 14.0   WBC 11.6* 12.3* 8.7 12.0*   RBC 4.48 4.30* 4.55 4.91   HCT 40.3 37.7* 41.3 44.0   MCV 90 88 91 90   MCH 28.3 28.1 28.8 28.5   MCHC 31.5 32.1 31.7 31.8   RDW 15.2* 15.9* 16.0* 15.9*    252 252 275     INR  Recent Labs   Lab 07/09/25  1038   INR 1.03     ABG  Recent Labs   Lab 07/09/25  1647 07/09/25  1617 07/09/25  1038   PH  --  7.36  --    PCO2  --  39  --    PO2  --  74*  --    HCO3  --  22  --    O2PER 15 4 21      Urine Studies  Recent Labs   Lab Test 07/09/25  1346 08/30/23  1856 08/20/23  1245 08/11/23  1928 06/03/20  1307   COLOR Yellow Yellow Light Yellow Light Yellow Yellow   APPEARANCE Slightly Cloudy*  Clear Clear Clear Clear   URINEGLC Negative Negative Negative Negative Negative   URINEBILI Negative Negative Negative Negative Negative   URINEKETONE Trace* 10* 60* 10* Negative   SG 1.015 1.021 1.015 1.009 1.008   UBLD Negative Negative Negative Negative Negative   URINEPH 6.0 5.0 6.0 6.0 6.5   PROTEIN 20* 10* 20* 10* Negative   UROBILINOGEN  --   --   --   --  <2.0 E.U./dL   NITRITE Negative Negative Negative Negative Negative   LEUKEST Negative Negative Negative Negative Negative   RBCU 1 <1 <1 0  --    WBCU 7* 3 1 3  --      Recent Labs   Lab Test 10/28/20  0902 10/01/19  0942 04/02/19  0917 10/23/18  1122 06/21/18  1209   UTPG 0.23* 0.26* 0.23* 0.21* 0.12     PTH  No lab results found.  Iron Studies  Recent Labs   Lab Test 10/02/23  0943   IRON 30*      IRONSAT 10*   LA 50       IMAGING:  All imaging studies reviewed by me.

## 2025-07-16 NOTE — PROGRESS NOTES
[facility]    Progress Note - Medicine Service, [facility] TEAM 2       Date of Admission:  7/9/2025    Assessment & Plan   65 year old male with PMHx of NSTEMI, CAD s/p CABG (2020), possible COPD, on chronic immunosuppression s/p renal transplant x2 presenting with worsening dyspnea and cough found to have positive resp PCR panel for Human Rhin/Enterovirus with bilateral ground glass opacities c/f possible viral vs atypical pneumonia, admitted for AHRF secondary to undifferentiated pneumonia. Initially, treated for possible CAP and COPD exacerbation with IV antibiotics, steroids and duonebs, without improvement. Found to have increased pulmonary edema on CXR and elevated BNP, thought to be secondary to acute decompensated heart failure and treated with IV diuresis, with minimal improvement. Now with AHRF secondary to unclear etiology, re-engaging pulmonology team for further evaluation and recommendations regarding pulmonary edema.       Changes Today:  - Held Lasix 60mg IV due to orthostatic hypotension  - Repeat chest xray with worsening pulmonary edema/fluid overload  - Re-engaging pulmonary regarding etiology of pulmonary edema    #AHRF 2/2 unclear etiology  #c/f acute decompensated heart failure  #C/f viral vs atypical pneumonia  Patient with acute hypoxemic respiratory failure and 3-day history of worsening dyspnea and nonproductive cough, requiring BiPAP on arrival. Initial concern for viral or atypical pneumonia given mild leukocytosis, elevated inflammatory markers, and CT chest showing bilateral ground glass opacities. RPP positive for rhinovirus/enterovirus, but this alone unlikely to cause severe lower respiratory tract disease. Although in an immunocompromised patient, rhinovirus/enterovirus could cause significant pneumonia. Urine antigens for Strep pneumo and Legionella negative. Treated empirically with IV ceftriaxone and doxycycline for 5 days without  clinical improvement. Blood cultures initially grew MRSE, likely contaminant; repeat cultures negative. Persistent hypoxia out of proportion to typical CAP or COPD raised concern for alternative or concurrent cardiogenic cause. CXR showed worsening pulmonary edema; BNP elevated (1800). ECHO on 7/14 showed preserved LVEF (55-60%) with mild RV dysfunction, likely secondary to bilateral infiltrates or chronic lung disease. Findings consistent with HFpEF with acute pulmonary edema. Diuresis initiated with IV furosemide; monitoring electrolytes, fluid balance, and I/Os, again with minimal clinical response. Downtrending CRP from 181 --> 135 suggests improving inflammation, though lack of clinical improvement may be due to concurrent HFpEF-related pulmonary edema. Overall unclear etiology driven acute hypoxemia. Will re-engage pulmonology team for further evaluation and recommendations regarding etiology of pulmonary edema and hypoxemia.    Plan:  -Holding IV diuretics given symptomatic orthostatic hypotension  -Pulm re-engaged for evaluation of pulmonary edema  -Infectious work up thus far:   Respiratory viral panel (positive for rhino/entero)  COVID, Influenza (negative)  Sputum culture 7/9 contaminated; will try for repeat collection 7/12   Urine histo and blasto ab, ag (negative)  Urine strep and legionella (negative)  B d glucan and aspergillosis (negative)  Procalcitonin 0.16  Repeat procalcitonin 0.06  MRSA swab (negative)    #Acute decompensated heart failure  #HFpEF (LVEF 55-60%)  #Hx of CAD s/p CABG  #Hx NSTEMI  NSTEMI in 2014. Previous ECHO from 8/2023 with LVEF of 60-65%.   Concern for acute decompensated heart failure with preserved EF (HFpEF) given likely pulmonary edema seen on CXR and increased BNP. Initiated IV diuresis.7/14 ECHO with normal global and regional left ventricular function with an LVEF of 55-60%. Global right ventricular function is mildly reduced. No hemodynamically significant valve  abnormalties. The inferior vena cava was normal in size with preserved respiratory variability with no pericardial effusion. Compared to study in 2023, global RV function is slightly lower. Findings consistent with HFpEF. PT consult on 7/16 discovered orthostatic hypotension upon exam (~80SBP). Lasix held. Argues for pulmonary edema likely 2/2 infx inflammatory process rather than cardiogenic. Chest x-ray ordered per pulm; awaiting further recs.     Plan:  -Holding diuresis until pulmonology can weight in about cause of pulmonary edema  -PTA Aspirin 81 mg daily  -Holding Nitroglycerin 0.4mg due to patient not taking                #COPD exacerbation   Reviewed PFTs from 4/2018 which showed moderate restriction with FVC 63%. Reports a 5 pack year smoking history. Unclear if patient has obstructive lung disease. Diagnosis of COPD appears to come from inaccurate health form filled out by patient years ago. Ideally get updated PFTs and DCLO testing. Interestingly he has had improvement with Bipap, but did not have evidence of CO2 retention on gases suggesting less obstructive pathology.      Plan:  -Incentive spirometry   -Duonebs q4h while awake  -Will continue PTA COPD inhalers for now  -PTA Breo Ellipta 1 puff/day  -PTA albuterol PRN     #Hx of renal transplant   #chronic immunosuppression  Hx of renal failure on dialysis at age 16, and kidney transplant, infected with hepatitis B from dialysis.     Plan:  Reduce PTA Mycophenolate 500mg BID (decreased dose for 5 days starting 7/10-7/14*, per nephrology)  Holding PTA Prednisone 5mg daily while on higher dose      #Oral Thrush   Pt noticed oral thrush and observed on buccal mucosa and tongue.  Ordered Nystatin.    #Lactic acidosis, resolved    Likely secondary to severe infection in setting of AHRF. LA on admission 3.3 --> 2.5 --> 1.4.      #Elevated troponins  #Type 2 demand ischemia   Likely secondary to demand ischemia in setting of severe infection and respiratory  failure. Without chest pain or signs of fluid overload on exam. EKG without acute ischemic changes. POC ECHO without pericardial effusion and normal appearing ejection fraction. 7/14 ECHO with normal global and regional left ventricular function with an LVEF of 55-60%.    #HTN     Plan:  PTA Metoprolol succinate 12.5mg daily  PTA Imdur 60 mg daily    #HLD     Plan:  -rosuvastatin 20mg daily     #Chronic hepatitis B     Plan:  -entecavir 0.5mg daily     #GERD     Plan:  -Pantoprazole 40mg daily     #KIM     Plan:  PTA Atarax 10-25mg PRN  PTA Fluoxetine 20mg daily  PTA Fluoxetine 40mg daily        Diet: Combination Diet Low Saturated Fat Na <2400mg Diet, No Caffeine Diet    DVT Prophylaxis: Enoxaparin (Lovenox) SQ  [place] Catheter: Not present  Fluids: none  Lines: None     Cardiac Monitoring: ACTIVE order. Indication: Bradycardias (48 hours)  Code Status: No CPR- Do NOT Intubate      Clinically Significant Risk Factors          # Hypochloremia: Lowest Cl = 93 mmol/L in last 2 days, will monitor as appropriate      # Hypoalbuminemia: Lowest albumin = 3.1 g/dL at 7/11/2025  7:16 AM, will monitor as appropriate     # Hypertension: Noted on problem list                       Social Drivers of Health   Tobacco Use: Medium Risk (6/30/2025)    Patient History     Smoking Tobacco Use: Former     Smokeless Tobacco Use: Former     Passive Exposure: Never   Physical Activity: Insufficiently Active (11/5/2024)    Exercise Vital Sign     Days of Exercise per Week: 5 days     Minutes of Exercise per Session: 20 min   Stress: Stress Concern Present (11/5/2024)    [employer] - Occupational Stress Questionnaire     Feeling of Stress : To some extent   Social Connections: Unknown (11/5/2024)    Social Connection and Isolation Panel [NHANES]     Frequency of Social Gatherings with Friends and Family: More than three times a week         Disposition Plan     Medically Ready for Discharge: Anticipated in 2-4  Days       The patient's care was discussed with the Attending Physician, Dr. Jesse Flores [facility]  Medicine Service, [facility] TEAM 2  [facility]  Securely message with Fronto (more info)  Text page via [facility] Paging/Directory   See signed in provider for up to date coverage information    Resident/Fellow Attestation   I, Alona Whalen DO, was present with the medical/ALISHA student who participated in the service and in the documentation of the note.  I have verified the history and personally performed the physical exam and medical decision making.  I agree with the assessment and plan of care as documented in the note.      Alona Whalen DO  PGY1  Date of Service (when I saw the patient): 07/16/25   _____________________________________________________________________    Interval History   No acute events overnight. This morning, Ander is feeling mildly better after lasix. Was able to sleep some.    Physical Exam   Vital Signs: Temp: 98.4  F (36.9  C) Temp src: Oral BP: 126/74 Pulse: 80   Resp: 20 SpO2: 95 % O2 Device: High Flow Nasal Cannula (HFNC) Oxygen Delivery: 40 LPM  Weight: 156 lbs 9.6 oz    .Physical Exam  Vitals reviewed.   Constitutional:       General: He is not in acute distress.  HENT:      Head: Normocephalic and atraumatic.   Cardiovascular:      Rate and Rhythm: Normal rate and regular rhythm.      Pulses: Normal pulses.   Pulmonary:      Effort: Respiratory distress present.      Breath sounds: Wheezing and rhonchi present.   Abdominal:      Palpations: Abdomen is soft.   Musculoskeletal:         General: No swelling.   Neurological:      General: No focal deficit present.       Medical Decision Making       Please see A&P for additional details of medical decision making.      Data     I have personally reviewed the following data over the past 24 hrs:    N/A  \   N/A   / N/A     135 93 (L) 23.2 (H) /  85   3.5 29 0.68 \     ALT: N/A AST: N/A AP: N/A TBILI:  N/A   ALB: 3.2 (L) TOT PROTEIN: N/A LIPASE: N/A       Imaging results reviewed over the past 24 hrs:   No results found for this or any previous visit (from the past 24 hours).

## 2025-07-16 NOTE — PLAN OF CARE
Goal Outcome Evaluation:      Plan of Care Reviewed With: patient    Overall Patient Progress: no change  Outcome Evaluation: Assumed cares: 5215-4768  Vitals: VSS on 40 LPM, 40%FiO2 High flow  Neuros: A&Ox4, able to make needs known  IV: PIV SL  Labs/Electrolytes: Review AM labs  Resp: Dyspnea upon exertion, cough, fair nonproductive.   Diet: Low fat NA <2400mg, no caffiene  GI: No BM overnight, passing flatus  : Voids via urinal  Skin: No new concerns noted  Pain: Denies  Activity: SBA  SCDs on?: No  Plan: Continue to follow POC, notify provider of any changes

## 2025-07-17 ENCOUNTER — APPOINTMENT (OUTPATIENT)
Dept: PHYSICAL THERAPY | Facility: CLINIC | Age: 65
DRG: 193 | End: 2025-07-17
Payer: COMMERCIAL

## 2025-07-17 VITALS
SYSTOLIC BLOOD PRESSURE: 115 MMHG | TEMPERATURE: 98.9 F | DIASTOLIC BLOOD PRESSURE: 70 MMHG | BODY MASS INDEX: 24.62 KG/M2 | RESPIRATION RATE: 20 BRPM | HEART RATE: 85 BPM | WEIGHT: 157.2 LBS | OXYGEN SATURATION: 95 %

## 2025-07-17 LAB
ALBUMIN SERPL BCG-MCNC: 2.9 G/DL (ref 3.5–5.2)
ANION GAP SERPL CALCULATED.3IONS-SCNC: 12 MMOL/L (ref 7–15)
BUN SERPL-MCNC: 18 MG/DL (ref 8–23)
CALCIUM SERPL-MCNC: 9.6 MG/DL (ref 8.8–10.4)
CHLORIDE SERPL-SCNC: 93 MMOL/L (ref 98–107)
CREAT SERPL-MCNC: 0.64 MG/DL (ref 0.67–1.17)
EGFRCR SERPLBLD CKD-EPI 2021: >90 ML/MIN/1.73M2
GLUCOSE SERPL-MCNC: 85 MG/DL (ref 70–99)
HCO3 SERPL-SCNC: 27 MMOL/L (ref 22–29)
MAGNESIUM SERPL-MCNC: 1.9 MG/DL (ref 1.7–2.3)
PHOSPHATE SERPL-MCNC: 3 MG/DL (ref 2.5–4.5)
POTASSIUM SERPL-SCNC: 3.8 MMOL/L (ref 3.4–5.3)
SODIUM SERPL-SCNC: 132 MMOL/L (ref 135–145)

## 2025-07-17 PROCEDURE — 94640 AIRWAY INHALATION TREATMENT: CPT | Mod: 76

## 2025-07-17 PROCEDURE — 250N000011 HC RX IP 250 OP 636

## 2025-07-17 PROCEDURE — 99233 SBSQ HOSP IP/OBS HIGH 50: CPT | Mod: GC | Performed by: STUDENT IN AN ORGANIZED HEALTH CARE EDUCATION/TRAINING PROGRAM

## 2025-07-17 PROCEDURE — 250N000012 HC RX MED GY IP 250 OP 636 PS 637: Performed by: NURSE PRACTITIONER

## 2025-07-17 PROCEDURE — 83735 ASSAY OF MAGNESIUM: CPT | Performed by: STUDENT IN AN ORGANIZED HEALTH CARE EDUCATION/TRAINING PROGRAM

## 2025-07-17 PROCEDURE — 36415 COLL VENOUS BLD VENIPUNCTURE: CPT | Performed by: STUDENT IN AN ORGANIZED HEALTH CARE EDUCATION/TRAINING PROGRAM

## 2025-07-17 PROCEDURE — 99232 SBSQ HOSP IP/OBS MODERATE 35: CPT | Performed by: NURSE PRACTITIONER

## 2025-07-17 PROCEDURE — 120N000005 HC R&B MS OVERFLOW UMMC

## 2025-07-17 PROCEDURE — 250N000013 HC RX MED GY IP 250 OP 250 PS 637

## 2025-07-17 PROCEDURE — 250N000009 HC RX 250

## 2025-07-17 PROCEDURE — 80069 RENAL FUNCTION PANEL: CPT

## 2025-07-17 PROCEDURE — 97530 THERAPEUTIC ACTIVITIES: CPT | Mod: GP

## 2025-07-17 PROCEDURE — 97116 GAIT TRAINING THERAPY: CPT | Mod: GP

## 2025-07-17 PROCEDURE — 250N000013 HC RX MED GY IP 250 OP 250 PS 637: Performed by: STUDENT IN AN ORGANIZED HEALTH CARE EDUCATION/TRAINING PROGRAM

## 2025-07-17 PROCEDURE — 999N000157 HC STATISTIC RCP TIME EA 10 MIN

## 2025-07-17 PROCEDURE — 250N000012 HC RX MED GY IP 250 OP 636 PS 637

## 2025-07-17 PROCEDURE — 94640 AIRWAY INHALATION TREATMENT: CPT

## 2025-07-17 RX ORDER — MAGNESIUM OXIDE 400 MG/1
400 TABLET ORAL EVERY 4 HOURS
Status: COMPLETED | OUTPATIENT
Start: 2025-07-17 | End: 2025-07-17

## 2025-07-17 RX ORDER — POTASSIUM CHLORIDE 750 MG/1
20 TABLET, EXTENDED RELEASE ORAL ONCE
Status: COMPLETED | OUTPATIENT
Start: 2025-07-17 | End: 2025-07-17

## 2025-07-17 RX ADMIN — ENOXAPARIN SODIUM 40 MG: 40 INJECTION SUBCUTANEOUS at 15:59

## 2025-07-17 RX ADMIN — NYSTATIN 500000 UNITS: 100000 SUSPENSION ORAL at 11:52

## 2025-07-17 RX ADMIN — GUAIFENESIN 200 MG: 200 SOLUTION ORAL at 01:00

## 2025-07-17 RX ADMIN — IPRATROPIUM BROMIDE AND ALBUTEROL SULFATE 3 ML: .5; 3 SOLUTION RESPIRATORY (INHALATION) at 08:06

## 2025-07-17 RX ADMIN — MYCOPHENOLATE MOFETIL 500 MG: 250 CAPSULE ORAL at 08:02

## 2025-07-17 RX ADMIN — PANTOPRAZOLE SODIUM 40 MG: 40 TABLET, DELAYED RELEASE ORAL at 08:02

## 2025-07-17 RX ADMIN — POLYETHYLENE GLYCOL 3350 17 G: 17 POWDER, FOR SOLUTION ORAL at 08:02

## 2025-07-17 RX ADMIN — NALTREXONE HYDROCHLORIDE 50 MG: 50 TABLET, FILM COATED ORAL at 08:02

## 2025-07-17 RX ADMIN — MAGNESIUM OXIDE TAB 400 MG (241.3 MG ELEMENTAL MG) 400 MG: 400 (241.3 MG) TAB at 15:59

## 2025-07-17 RX ADMIN — ACETAMINOPHEN 650 MG: 325 TABLET ORAL at 01:00

## 2025-07-17 RX ADMIN — MAGNESIUM OXIDE TAB 400 MG (241.3 MG ELEMENTAL MG) 400 MG: 400 (241.3 MG) TAB at 11:52

## 2025-07-17 RX ADMIN — Medication 1 TABLET: at 11:52

## 2025-07-17 RX ADMIN — ENTECAVIR 0.5 MG: 0.5 TABLET ORAL at 08:03

## 2025-07-17 RX ADMIN — GUAIFENESIN 200 MG: 200 SOLUTION ORAL at 21:00

## 2025-07-17 RX ADMIN — ISOSORBIDE MONONITRATE 60 MG: 60 TABLET, EXTENDED RELEASE ORAL at 08:02

## 2025-07-17 RX ADMIN — ASPIRIN 81 MG CHEWABLE TABLET 81 MG: 81 TABLET CHEWABLE at 08:02

## 2025-07-17 RX ADMIN — IPRATROPIUM BROMIDE AND ALBUTEROL SULFATE 3 ML: .5; 3 SOLUTION RESPIRATORY (INHALATION) at 16:48

## 2025-07-17 RX ADMIN — POTASSIUM CHLORIDE 20 MEQ: 750 TABLET, EXTENDED RELEASE ORAL at 11:52

## 2025-07-17 RX ADMIN — GUAIFENESIN 200 MG: 200 SOLUTION ORAL at 15:37

## 2025-07-17 RX ADMIN — ACETAMINOPHEN 650 MG: 325 TABLET ORAL at 15:59

## 2025-07-17 RX ADMIN — Medication 12.5 MG: at 21:00

## 2025-07-17 RX ADMIN — FLUOXETINE HYDROCHLORIDE 60 MG: 40 CAPSULE ORAL at 08:03

## 2025-07-17 RX ADMIN — ACETAMINOPHEN 650 MG: 325 TABLET ORAL at 08:02

## 2025-07-17 RX ADMIN — IPRATROPIUM BROMIDE AND ALBUTEROL SULFATE 3 ML: .5; 3 SOLUTION RESPIRATORY (INHALATION) at 12:31

## 2025-07-17 RX ADMIN — ROSUVASTATIN CALCIUM 20 MG: 20 TABLET, FILM COATED ORAL at 08:03

## 2025-07-17 RX ADMIN — MYCOPHENOLATE MOFETIL 500 MG: 250 CAPSULE ORAL at 18:43

## 2025-07-17 RX ADMIN — NYSTATIN 500000 UNITS: 100000 SUSPENSION ORAL at 15:59

## 2025-07-17 RX ADMIN — NYSTATIN 500000 UNITS: 100000 SUSPENSION ORAL at 21:00

## 2025-07-17 RX ADMIN — NYSTATIN 500000 UNITS: 100000 SUSPENSION ORAL at 08:02

## 2025-07-17 RX ADMIN — PREDNISONE 5 MG: 5 TABLET ORAL at 08:03

## 2025-07-17 RX ADMIN — FLUTICASONE FUROATE AND VILANTEROL TRIFENATATE 1 PUFF: 100; 25 POWDER RESPIRATORY (INHALATION) at 10:18

## 2025-07-17 RX ADMIN — IPRATROPIUM BROMIDE AND ALBUTEROL SULFATE 3 ML: .5; 3 SOLUTION RESPIRATORY (INHALATION) at 19:59

## 2025-07-17 ASSESSMENT — ACTIVITIES OF DAILY LIVING (ADL)
ADLS_ACUITY_SCORE: 47
DEPENDENT_IADLS:: INDEPENDENT
ADLS_ACUITY_SCORE: 47

## 2025-07-17 NOTE — PROGRESS NOTES
[facility]  Transplant Nephrology Progress Note  Date of Admission:  7/9/2025  Today's Date: 07/17/2025    Recommendations:   - Would hold diuretics today.  - No acute indications for dialysis.  - Would make no changes in immunosuppression.  - Appreciate Pulmonary recommendations.    Assessment & Plan   # DDKT: Stable creatinine, at baseline. Good urine output.     - Baseline Creatinine: ~ 0.7-0.9   - Proteinuria: Normal (<0.2 grams)   - DSA Hx: Not checked recently due to time from transplant   - Last cPRA: unknown%   - BK Viremia: Not checked recently due to time from transplant   - Kidney Tx Biopsy Hx: No biopsy history.    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     # Immunosuppression Prior to Admission: Mycophenolate mofetil (dose 750 mg every 12 hours) and Prednisone (dose 5 mg daily)   - Present Immunosuppression: Mycophenolate mofetil (dose 500 mg every 12 hours) and Prednisone (dose 5 mg daily)    - Induction with Recent Transplant:  Not known due to time from transplant   - Continue with intensive monitoring of immunosuppression for efficacy and toxicity.   - Historical Changes in Immunosuppression: None   - Changes: Not at this time    # Infection Prevention:   Last CD4 Level: Not checked recently  - PJP: None      - CMV IgG Ab High Risk Discordance (D+/R-) at time of transplant: Unknown  Present CMV Serostatus: Unknown  - EBV IgG Ab High Risk Discordance (D+/R-) at time of transplant: Unknown  Present EBV Serostatus: Unknown    # Hypertension: Controlled;  Goal BP: < 140/90 (Hospitalization goal)   - Changes: Not at this time    # Anemia in Chronic Renal Disease: Hgb: Stable      CAROL ANN: No   - Iron studies: Not checked recently    # Mineral Bone Disorder:    - Calcium; level: Normal        On supplement: Yes; calcium citrate 315 mg daily.  - Phosphorus; level: Normal        On supplement: No    # Electrolytes:  - Potassium; level: Normal         On supplement: No  - Magnesium; level: Normal        On supplement: No  - Bicarbonate; level: Normal        On supplement: No  - Sodium; level: Normal    # Acute hypoxic respiratory failure:  # Pulmonary edema:  # Viral versus Atypical Pneumonia: + Rhino/enterovirus. CT PE negative. Gram + Bcx on 07/09, likely contaminant. Repeat blood cultures 07/10. Sputum culture 7/12 likely contaminated, per report. Repeat pending. CXR 7/13 with pulmonary edema. Started on ceftriaxone and currently on prednisone 40 mg daily and doxycycline.    - CXR 7/16 with pulmonary edema and bilateral pleural effusions.   - Management per primary team. Pulmonology following.    # Acute decompensated heart failure: echo in 2023 with no evidence of HF. BNP 1800 on 7/13. PTA on 20 mg lasix PO daily.    - Echo 7/14; EF 55-65% with grade I diastolic dysfunction. No pericardial effusion.   - Management per primary team.     # Other Significant PMH:   - CAD, s/p CABG: Last cardiac stress test was abnormal in 6/2023 (but unchanged from prior testing in 2022).  Coronary angiogram 9/2021 that showed some possible obstructive disease in the 1st diagonal, but unable to stent it.  Bypass grafts were open.  Plan is for medical management. Last cardiac echo (8/2023) showed LVEF ~ 60-65%. Normal right ventricular systolic function. Normal diastolic dysfunction.    - Provoked PE (8/2023): after right hip revision surgery. Completed AC.   - Chronic Hepatitis B: Stable on entecavir. Follows here with hepatology.   - Asthma: Some increase in shortness of breath, but felt more cardiac in origin.  Patient is stable on inhalers.   - GERD: Asymptomatic on pantoprazole, but with h/o ulcer, will continue on medication.   - Skin Cancer: SCCIS, right lateral chest, s/p bx 5/9/24, s/p MMS on 8/5/24     # Transplant History:  Etiology of Kidney Failure: Focal segmental glomerulosclerosis (FSGS)  Tx: DDKT  Transplant: 10/6/1993 (Kidney), 4/18/1978 (Kidney)  Significant  transplant-related complications: Recurrent Skin Cancers    Recommendations were communicated to the primary team via Vocera.    Seen and discussed with Dr. Bailon.     RONALDO Moreira [facility]  Transplant Nephrology    Interval History  Mr. Burgesss creatinine is 0.64 (07/17 1817); Stable.  Good urine output.  Other significant labs/tests/vitals: VSS. On 8L oxymask. CXR yesterday with pulmonary edema and bilateral pleural effusions.   No events overnight.  He reports mild cough today with improvement in SOB. No CP.  No leg swelling.  No nausea and vomiting. Bowel movements are formed.  No fever, sweats or chills.        Review of Systems   4 point ROS was obtained and negative except as noted in the Interval History.    MEDICATIONS:  Current Facility-Administered Medications   Medication Dose Route Frequency Provider Last Rate Last Admin    aspirin (ASA) chewable tablet 81 mg  81 mg Oral Daily Whalen, Alona, DO   81 mg at 07/16/25 0856    calcium citrate-vitamin D (CITRACAL) 315-6.25 MG-MCG per tablet 1 tablet  1 tablet Oral Daily with lunch Blake Plasencia MD   1 tablet at 07/16/25 1227    enoxaparin ANTICOAGULANT (LOVENOX) injection 40 mg  40 mg Subcutaneous Q24H Whalen, Alona, DO   40 mg at 07/16/25 1610    entecavir (BARACLUDE) tablet 0.5 mg  0.5 mg Oral Daily Whalen, Alona, DO   0.5 mg at 07/16/25 0856    FLUoxetine (PROzac) capsule 60 mg  60 mg Oral Daily Blake Plasencia MD   60 mg at 07/16/25 0856    fluticasone-vilanterol (BREO ELLIPTA) 100-25 MCG/ACT inhaler 1 puff  1 puff Inhalation Daily Whalen, Alona, DO   1 puff at 07/16/25 0857    ipratropium - albuterol 0.5 mg/2.5 mg (3mg)/3 mL (DUONEB) neb solution 3 mL  3 mL Nebulization Q4H WA Whalen, Alona, DO   3 mL at 07/16/25 2011    isosorbide mononitrate (IMDUR) 24 hr tablet 60 mg  60 mg Oral Daily Whalen, Alona, DO   60 mg at 07/16/25 0857    metoprolol succinate ER (TOPROL-XL) 24 hr half-tab 12.5 mg  12.5 mg Oral Daily Alona Whalen DO   12.5 mg at 07/16/25 2000     multivitamin w/minerals (THERA-VIT-M) tablet 1 tablet  1 tablet Oral Daily with lunch Blake Plasencia MD   1 tablet at 25 1227    mycophenolate (GENERIC EQUIVALENT) capsule 500 mg  500 mg Oral BID IS Lisa Levine APRN CNP   500 mg at 25 1815    naltrexone (DEPADE/REVIA) tablet 50 mg  50 mg Oral Daily Whalen, Alona, DO   50 mg at 25 0857    nystatin (MYCOSTATIN) suspension 500,000 Units  500,000 Units Swish & Swallow 4x Daily Blake Plasencia MD   500,000 Units at 25 2000    pantoprazole (PROTONIX) EC tablet 40 mg  40 mg Oral Daily Whalen, Alona, DO   40 mg at 25 0857    polyethylene glycol (MIRALAX) Packet 17 g  17 g Oral Daily Michael Mas, DO   17 g at 25 0856    predniSONE (DELTASONE) tablet 5 mg  5 mg Oral Daily Ryan Isbell MD   5 mg at 25 0856    psyllium (METAMUCIL/KONSYL) Packet 1 packet  1 packet Oral Daily Blake Plasencia MD   1 packet at 25 0808    rosuvastatin (CRESTOR) tablet 20 mg  20 mg Oral Daily Whalen, Alona, DO   20 mg at 25 0856    sodium chloride (PF) 0.9% PF flush 3 mL  3 mL Intracatheter Q8H NE Whalen, Laona, DO   3 mL at 25 1610     Current Facility-Administered Medications   Medication Dose Route Frequency Provider Last Rate Last Admin       Physical Exam   Temp  Av.3  F (36.8  C)  Min: 97.8  F (36.6  C)  Max: 98.8  F (37.1  C)      Pulse  Av  Min: 63  Max: 80 Resp  Av.9  Min: 18  Max: 44  FiO2 (%)  Av.2 %  Min: 50 %  Max: 65 %  SpO2  Av.2 %  Min: 90 %  Max: 99 %     /75 (BP Location: Left arm)   Pulse 75   Temp 97.5  F (36.4  C) (Oral)   Resp 24   Wt 71.3 kg (157 lb 3.2 oz)   SpO2 97%   BMI 24.62 kg/m      Admit       GENERAL APPEARANCE: alert and no distress  HENT: mouth without ulcers or lesions  RESP: bilateral crackles and wheezes  CV: regular rhythm, normal rate, no rub, no murmur  EDEMA: no LE edema bilaterally  ABDOMEN: soft, nondistended, nontender  MS: extremities normal - no gross  deformities noted, no evidence of inflammation in joints, no muscle tenderness  SKIN: no rash, numerous keratoses  TX KIDNEY: normal  DIALYSIS ACCESS: LUE AV Fistula (No thrill)    Data   All labs reviewed by me.  CMP  Recent Labs   Lab 07/17/25  0527 07/16/25  0533 07/15/25  1541 07/15/25  0613 07/14/25  1332   * 135  --  137 135   POTASSIUM 3.8 3.5 3.6 3.9 4.0   CHLORIDE 93* 93*  --  96* 96*   CO2 27 29  --  27 26   ANIONGAP 12 13  --  14 13   GLC 85 85  --  86 226*   BUN 18.0 23.2*  --  24.8* 20.9   CR 0.64* 0.68  --  0.68 0.70   GFRESTIMATED >90 >90  --  >90 >90   HEMA 9.6 9.8  --  9.8 9.7   MAG 1.9 1.8 1.7 1.9 1.8   PHOS 3.0 3.6 4.0 3.2 2.7   ALBUMIN 2.9* 3.2*  --  3.2* 3.2*     CBC  Recent Labs   Lab 07/13/25  1114 07/11/25  0716 07/10/25  0936   HGB 12.7* 12.1* 13.1*   WBC 11.6* 12.3* 8.7   RBC 4.48 4.30* 4.55   HCT 40.3 37.7* 41.3   MCV 90 88 91   MCH 28.3 28.1 28.8   MCHC 31.5 32.1 31.7   RDW 15.2* 15.9* 16.0*    252 252     INR  No lab results found in last 7 days.    ABG  No lab results found in last 7 days.     Urine Studies  Recent Labs   Lab Test 07/09/25  1346 08/30/23  1856 08/20/23  1245 08/11/23  1928 06/03/20  1307   COLOR Yellow Yellow Light Yellow Light Yellow Yellow   APPEARANCE Slightly Cloudy* Clear Clear Clear Clear   URINEGLC Negative Negative Negative Negative Negative   URINEBILI Negative Negative Negative Negative Negative   URINEKETONE Trace* 10* 60* 10* Negative   SG 1.015 1.021 1.015 1.009 1.008   UBLD Negative Negative Negative Negative Negative   URINEPH 6.0 5.0 6.0 6.0 6.5   PROTEIN 20* 10* 20* 10* Negative   UROBILINOGEN  --   --   --   --  <2.0 E.U./dL   NITRITE Negative Negative Negative Negative Negative   LEUKEST Negative Negative Negative Negative Negative   RBCU 1 <1 <1 0  --    WBCU 7* 3 1 3  --      Recent Labs   Lab Test 10/28/20  0902 10/01/19  0942 04/02/19  0917 10/23/18  1122 06/21/18  1209   UTPG 0.23* 0.26* 0.23* 0.21* 0.12     PTH  No lab results  found.  Iron Studies  Recent Labs   Lab Test 10/02/23  0943   IRON 30*      IRONSAT 10*   LA 50       IMAGING:  All imaging studies reviewed by me.

## 2025-07-17 NOTE — PROGRESS NOTES
[facility]    Progress Note - Medicine Service, MAROON TEAM 2       Date of Admission:  7/9/2025    Assessment & Plan   Jerad Ross is a 65 year old male presenting with SOB/dizzy. PMHx CABG, chronic Hep B, renal transplant (1993/1978) on chronic immunosuppression, restrictive lung disease admitted for AHRF and ?decompensated HFpEF. CT chest c/f atypical PNA versus ARDS. Also, some concern for decompensated HFpEF, treated with IV lasix 60 mg but this was complicated by orthostatic hypoTN. But improved gradually without diuresis, yielding some other etiology than pulmonary edema.    Changes Today:  - On NC 4L from HFNC even though did not get any diuresis today  - Monitoring off diuresis  - Encouraged mobilization      #AHRF 2/2 unclear etiology  #c/f acute decompensated heart failure with preserved EF  #C/f viral vs atypical pneumonia  Patient with acute hypoxemic respiratory failure and 3-day history of dyspnea and nonproductive cough, requiring BiPAP on arrival. Initial workup suggested viral or atypical pneumonia (mild leukocytosis, elevated inflammatory markers, bilateral ground-glass opacities on CT). RPP positive for rhinovirus/enterovirus--an uncommon cause of severe LRTI, though possible in immunocompromised patients. Urine antigens for Strep pneumo and Legionella were negative. Empirically treated with IV ceftriaxone and doxycycline for 5 days without improvement. Persistent hypoxia raised concern for a cardiogenic component. CXR showed worsening pulmonary edema with elevated BNP (1800); TTE on 7/14 showed preserved LVEF (55-60%) and mild RV dysfunction, consistent with HFpEF and acute pulmonary edema. Trial of IV furosemide yielded minimal improvement and led to symptomatic orthostatic hypotension, suggesting euvolemia. Given worsening CXR on 7/17, infectious alveolar edema is now favored over cardiogenic pulmonary edema though could be  concurrent.    Plan:  -Holding IV diuretics given symptomatic orthostatic hypotension and now improving clinical status without lasix  -Pulm re-engaged for evaluation of pulmonary edema  -Infectious work up thus far:   Respiratory viral panel (positive for rhino/entero)  COVID, Influenza (negative)  Sputum culture 7/9 contaminated; will try for repeat collection 7/12   Urine histo and blasto ab, ag (negative)  Urine strep and legionella (negative)  B d glucan and aspergillosis (negative)  Procalcitonin 0.16  Repeat procalcitonin 0.06  MRSA swab (negative)    #Acute decompensated heart failure  #HFpEF (LVEF 55-60%)  #Hx of CAD s/p CABG  #Hx NSTEMI  History of NSTEMI (2014) with prior TTE (8/2023) showing LVEF 60-65%. Current concern for acute decompensated heart failure with preserved ejection fraction (HFpEF) given pulmonary edema on CXR and elevated BNP. IV diuresis initiated. TTE on 7/14 showed preserved LVEF (55-60%) with normal LV function, mildly reduced RV function (slightly worse than 2023), normal IVC with preserved respiratory variation, and no significant valvular or pericardial abnormalities -- overall consistent with HFpEF. However, PT exam on 7/16 revealed orthostatic hypotension (SBP ~80s), prompting concern for a euvolemic state. Lasix held. Worsening bilateral pulmonary edema seen on 7/16 CXR, but patient clinically improved with reduced oxygen requirements (now on nasal cannula). Current impression is that pulmonary edema is more likely secondary to an infectious/inflammatory process rather than cardiogenic overload.    Plan:  -Holding Lasix     #COPD exacerbation   Reviewed PFTs from 4/2018 which showed moderate restriction with FVC 63%. Reports a 5 pack year smoking history. Unclear if patient has obstructive lung disease. Diagnosis of COPD appears to come from inaccurate health form filled out by patient years ago. Ideally get updated PFTs and DCLO testing. Interestingly he has had improvement  with Bipap, but did not have evidence of CO2 retention on gases suggesting less obstructive pathology.      Plan:  -Incentive spirometry   -Duonebs q4h while awake  -Will continue PTA COPD inhalers for now  -PTA Breo Ellipta 1 puff/day  -PTA albuterol PRN     #Hx of renal transplant   #chronic immunosuppression  Hx of renal failure on dialysis at age 16, and kidney transplant, infected with hepatitis B from dialysis. Stable kidney function this admission.    Plan:  Reduce PTA Mycophenolate 500mg BID (decreased dose for 5 days starting 7/10-7/14*, per nephrology)   PTA Prednisone 5mg daily      #Oral Thrush, improving  Pt noticed oral thrush and observed on buccal mucosa and tongue.  Ordered Nystatin.    #Lactic acidosis, resolved    Likely secondary to severe infection in setting of AHRF. LA on admission 3.3 --> 2.5 --> 1.4.      #Elevated troponins, reolved  #Type 2 demand ischemia   Likely secondary to demand ischemia in setting of severe infection and respiratory failure. Without chest pain or signs of fluid overload on exam. EKG without acute ischemic changes. POC ECHO without pericardial effusion and normal appearing ejection fraction. 7/14 ECHO with normal global and regional left ventricular function with an LVEF of 55-60%.      Chronic Medical Problems:    #HTN, controlled  Plan:  PTA Metoprolol succinate 12.5mg daily  PTA Imdur 60 mg daily    #HLD  Plan:  -rosuvastatin 20mg daily    #Chronic hepatitis B  Plan:  -entecavir 0.5mg daily     #GERD  Plan:  -Pantoprazole 40mg daily     #KIM  Plan:  PTA Atarax 10-25mg PRN  PTA Fluoxetine 20mg daily  PTA Fluoxetine 40mg daily        Diet: Combination Diet Low Saturated Fat Na <2400mg Diet, No Caffeine Diet    DVT Prophylaxis: Enoxaparin (Lovenox) SQ  [place] Catheter: Not present  Fluids: none  Lines: None     Cardiac Monitoring: ACTIVE order. Indication: Bradycardias (48 hours)  Code Status: No CPR- Do NOT Intubate      Clinically Significant Risk Factors         #  Hyponatremia: Lowest Na = 132 mmol/L in last 2 days, will monitor as appropriate  # Hypochloremia: Lowest Cl = 93 mmol/L in last 2 days, will monitor as appropriate   # Hypercalcemia: corrected calcium is >10.1, will monitor as appropriate    # Hypoalbuminemia: Lowest albumin = 2.9 g/dL at 7/17/2025  5:27 AM, will monitor as appropriate     # Hypertension: Noted on problem list                       Social Drivers of Health   Tobacco Use: Medium Risk (6/30/2025)    Patient History     Smoking Tobacco Use: Former     Smokeless Tobacco Use: Former     Passive Exposure: Never   Physical Activity: Insufficiently Active (11/5/2024)    Exercise Vital Sign     Days of Exercise per Week: 5 days     Minutes of Exercise per Session: 20 min   Stress: Stress Concern Present (11/5/2024)    [employer] [place] [employer] - Occupational Stress Questionnaire     Feeling of Stress : To some extent   Social Connections: Unknown (11/5/2024)    Social Connection and Isolation Panel [NHANES]     Frequency of Social Gatherings with Friends and Family: More than three times a week         Disposition Plan     Medically Ready for Discharge: Anticipated in 2-4 Days       The patient's care was discussed with the Attending Physician, Dr. Jaime.    [facility]  Medicine Service, [address]  Securely message with TheForm[employer] (more info)  Text page via ticketscript Paging/Directory   See signed in provider for up to date coverage information    Resident/Fellow Attestation   I, Heriberto Darling, was present with the medical/ALISHA student who participated in the service and in the documentation of the note.  I have verified the history and personally performed the physical exam and medical decision making.  I agree with the assessment and plan of care as documented in the note.      Heriberto Darling MD  Resident Physician PGY-3  Department of Medicine  [facility]  [place]    _____________________________________________________________________    Interval History   No acute events overnight. Feeling less fatigue from yesterday. Feels like is respiratory effort is less labored. Overall improvement.    Physical Exam   Vital Signs: Temp: 97.7  F (36.5  C) Temp src: Oral BP: 98/73 Pulse: 82   Resp: 16 SpO2: (!) 89 % O2 Device: Nasal cannula Oxygen Delivery: 6 LPM  Weight: 157 lbs 3.2 oz    .Physical Exam  Vitals reviewed.   Constitutional:       General: He is not in acute distress.  HENT:      Head: Normocephalic and atraumatic.   Cardiovascular:      Rate and Rhythm: Normal rate and regular rhythm.      Pulses: Normal pulses.      Comments: No lower extremity edema. +1 sacral edema.  Pulmonary:      Effort: No respiratory distress.      Breath sounds: Wheezing and rhonchi present.      Comments: On nasal cannula 6L  Abdominal:      Palpations: Abdomen is soft.   Musculoskeletal:         General: No swelling.   Neurological:      General: No focal deficit present.       Medical Decision Making       Please see A&P for additional details of medical decision making.      Data     I have personally reviewed the following data over the past 24 hrs:    N/A  \   N/A   / N/A     132 (L) 93 (L) 18.0 /  85   3.8 27 0.64 (L) \     ALT: N/A AST: N/A AP: N/A TBILI: N/A   ALB: 2.9 (L) TOT PROTEIN: N/A LIPASE: N/A       Imaging results reviewed over the past 24 hrs:   Recent Results (from the past 24 hours)   XR Chest Port 1 View    Narrative    Exam: XR CHEST PORT 1 VIEW, 7/16/2025 2:08 PM    Comparison: Chest x-ray dated 7/13/2025    History: AHRF, pulm edema    Findings:  Portable AP view of the chest.  Worsening bilateral interstitial opacity extending from the hilum to  the periphery, associated with bulky mediastinal vasculature,  suggestive of interstitial pulmonary edema. There is associated stable  blunting of bilateral costophrenic angles, suggestive of bilateral  trace pleural  effusions. The heart is mildly enlarged. Left-sided  mediastinal clips are noted. Trachea is roughly in the midline.  Multiple calcifications on the left upper abdominal quadrant, possibly  renal calculi. Abdominal surgical clips are also visualized.   Median sternotomy wires appear intact on AP view.   Advanced osteoarthritis of bilateral shoulder joints, with visible  bone degeneration of the left glenoid glenoid.       Impression    Impression:   1.  Worsening pulmonary edema and fluid overload.  2.  Stable bilateral trace pleural effusions.  3.  Advanced osteoarthritis of bilateral shoulder joints.    I have personally reviewed the examination and initial interpretation  and I agree with the findings.    ESMER KING MD         SYSTEM ID:  [id number]

## 2025-07-17 NOTE — PLAN OF CARE
Goal Outcome Evaluation:      Plan of Care Reviewed With: patient    Overall Patient Progress: no changeOverall Patient Progress: no change    Outcome Evaluation: Pt anticipates discharge to TCU when medically stable for discharge

## 2025-07-17 NOTE — PLAN OF CARE
VS:  Satting well on 8L Oxymask. OVSS.  PAIN/NAUSEA: C/o pain in his back and neck. Prn Tylenol x 1 with relief.  DIET:  Low sodium, low fat, no caffeine  LDA:  PIV x 1  GI:   No nausea. LBM 7/15  :  voided 250 ml leonarda so far this shift.      MOBILITY:  Not OOB this shift. Used the urinal in bed x 1.    Pt put back on tele as his HR has dropped into the 30s-50s very briefly a few times

## 2025-07-17 NOTE — CONSULTS
Care Management Initial Consult    General Information  Assessment completed with: Patient,    Type of CM/SW Visit: Initial Assessment    Primary Care Provider verified and updated as needed: Yes   Readmission within the last 30 days: no previous admission in last 30 days      Reason for Consult: discharge planning  Advance Care Planning: Advance Care Planning Reviewed: other (see comments) (Forms given to patient for review.  Pt aware that CM team available should he have any questions)          Communication Assessment  Patient's communication style: spoken language (English or Bilingual)    Hearing Difficulty or Deaf: no   Wear Glasses or Blind: yes    Cognitive  Cognitive/Neuro/Behavioral: WDL                      Living Environment:   People in home: other (see comments) (Pt has a roommate)     Current living Arrangements: house      Able to return to prior arrangements:  (TCU vs home with home care)       Family/Social Support:  Care provided by: self  Provides care for: no one     Support system: Sibling(s)          Description of Support System: Supportive    Support Assessment: Adequate family and caregiver support    Current Resources:   Patient receiving home care services: No        Community Resources: None  Equipment currently used at home: walker, rolling, shower chair  Supplies currently used at home: None    Employment/Financial:  Employment Status: retired        Financial Concerns:     Referral to Financial Worker: No       Does the patient's insurance plan have a 3 day qualifying hospital stay waiver?  No    Lifestyle & Psychosocial Needs:  Social Drivers of Health     Food Insecurity: Low Risk  (7/13/2025)    Food Insecurity     Within the past 12 months, did you worry that your food would run out before you got money to buy more?: No     Within the past 12 months, did the food you bought just not last and you didn t have money to get more?: No   Depression: Not at risk (6/12/2025)    PHQ-2      PHQ-2 Score: 1   Housing Stability: Low Risk  (7/13/2025)    Housing Stability     Do you have housing? : Yes     Are you worried about losing your housing?: No   Tobacco Use: Medium Risk (6/30/2025)    Patient History     Smoking Tobacco Use: Former     Smokeless Tobacco Use: Former     Passive Exposure: Never   Financial Resource Strain: Low Risk  (7/13/2025)    Financial Resource Strain     Within the past 12 months, have you or your family members you live with been unable to get utilities (heat, electricity) when it was really needed?: No   Alcohol Use: Not on file   Transportation Needs: Low Risk  (7/13/2025)    Transportation Needs     Within the past 12 months, has lack of transportation kept you from medical appointments, getting your medicines, non-medical meetings or appointments, work, or from getting things that you need?: No   Physical Activity: Insufficiently Active (11/5/2024)    Exercise Vital Sign     Days of Exercise per Week: 5 days     Minutes of Exercise per Session: 20 min   Interpersonal Safety: Low Risk  (7/13/2025)    Interpersonal Safety     Do you feel physically and emotionally safe where you currently live?: Yes     Within the past 12 months, have you been hit, slapped, kicked or otherwise physically hurt by someone?: No     Within the past 12 months, have you been humiliated or emotionally abused in other ways by your partner or ex-partner?: No   Stress: Stress Concern Present (11/5/2024)    [employer] - Occupational Stress Questionnaire     Feeling of Stress : To some extent   Social Connections: Unknown (11/5/2024)    Social Connection and Isolation Panel [NHANES]     Frequency of Communication with Friends and Family: Not on file     Frequency of Social Gatherings with Friends and Family: More than three times a week     Attends [employer] Services: Not on file     Active Member of Clubs or Organizations: Not on file     Attends Club or Organization  Meetings: Not on file     Marital Status: Not on file   Health Literacy: Not on file       Functional Status:  Prior to admission patient needed assistance:   Dependent ADLs:: Independent  Dependent IADLs:: Independent       Mental Health Status:  Mental Health Status: No Current Concerns       Chemical Dependency Status:  Chemical Dependency Status: No Current Concerns             Values/Beliefs:  Spiritual, Cultural Beliefs, Jain Practices, Values that affect care: no               Discussed  Partnership in Safe Discharge Planning  document with patient/family: No    Additional Information:  Patient with a complex medical history admitted with c/o shortness of breath, cough,rhinovirus infection and bilateral ground glass opacities c/f possible viral vs atypical pneumonia, admitted for AHRF secondary to undifferentiated pneumonia. Pt currently requiring 6 liters O2.  Noted per chart review PT currently recommending TCU however pending LOs may progress to home with home care vs OP PT.  CMA for HCD, discharge planning      Met with pt.  Prior to hospitalization pt lived independently in his own home with a roommate. Pt managed all his own care needs.  Pt has groceries delivered.  Pt and his roommate work together on some of the household tasks.  Pt lives in a three level home and will need to independently manage at least 7+ stairs to access bedroom and bathroom.  Pt is not currently open to skilled home care services.  Reviewed/discussed TCU recommendation.  Reviewed/discussed pending LOS may progress to home with home care.  Pt is open to TCU recommendation.  Pt prefers [facility] or [facility] (both [facility] homes).  Pt does not have home O2.  Briefly discussed discharge transportation - [facility] w/c transport (pt aware would be private pay) vs if able to discharge to home pt may coordinate a ride with his roommate or arrange for a Lyft home.  Discussed HCD forms - long form and short form  options.  Provided pt with information sheet on HCD.  Pt aware that CM team available should he have any questions or concerns.   Pt aware that CM team will be following for discharge planning.    Reviewed above with Migue DAVEY.    Next Steps:   Follow for discharge planning  Initiate TCU referrals when appropriate    Lorin Flores, RN BSN, PHN, ACM-RN  7A Nurse Care Coordinator    [facility] Acute Care Management  Phone: [phone]  Available on [email or web address]  7A SOT RNCC

## 2025-07-18 ENCOUNTER — APPOINTMENT (OUTPATIENT)
Dept: PHYSICAL THERAPY | Facility: CLINIC | Age: 65
DRG: 193 | End: 2025-07-18
Payer: COMMERCIAL

## 2025-07-18 LAB
ANION GAP SERPL CALCULATED.3IONS-SCNC: 11 MMOL/L (ref 7–15)
BUN SERPL-MCNC: 11.8 MG/DL (ref 8–23)
CALCIUM SERPL-MCNC: 9.5 MG/DL (ref 8.8–10.4)
CHLORIDE SERPL-SCNC: 93 MMOL/L (ref 98–107)
CREAT SERPL-MCNC: 0.72 MG/DL (ref 0.67–1.17)
EGFRCR SERPLBLD CKD-EPI 2021: >90 ML/MIN/1.73M2
GLUCOSE SERPL-MCNC: 85 MG/DL (ref 70–99)
HCO3 SERPL-SCNC: 29 MMOL/L (ref 22–29)
HOLD SPECIMEN: NORMAL
MAGNESIUM SERPL-MCNC: 2 MG/DL (ref 1.7–2.3)
PHOSPHATE SERPL-MCNC: 2.4 MG/DL (ref 2.5–4.5)
POTASSIUM SERPL-SCNC: 4 MMOL/L (ref 3.4–5.3)
SODIUM SERPL-SCNC: 133 MMOL/L (ref 135–145)

## 2025-07-18 PROCEDURE — 97530 THERAPEUTIC ACTIVITIES: CPT | Mod: GP

## 2025-07-18 PROCEDURE — 97116 GAIT TRAINING THERAPY: CPT | Mod: GP

## 2025-07-18 PROCEDURE — 36415 COLL VENOUS BLD VENIPUNCTURE: CPT

## 2025-07-18 PROCEDURE — 250N000013 HC RX MED GY IP 250 OP 250 PS 637

## 2025-07-18 PROCEDURE — 84100 ASSAY OF PHOSPHORUS: CPT

## 2025-07-18 PROCEDURE — 80048 BASIC METABOLIC PNL TOTAL CA: CPT

## 2025-07-18 PROCEDURE — 250N000013 HC RX MED GY IP 250 OP 250 PS 637: Performed by: STUDENT IN AN ORGANIZED HEALTH CARE EDUCATION/TRAINING PROGRAM

## 2025-07-18 PROCEDURE — 94640 AIRWAY INHALATION TREATMENT: CPT

## 2025-07-18 PROCEDURE — 250N000012 HC RX MED GY IP 250 OP 636 PS 637

## 2025-07-18 PROCEDURE — 999N000157 HC STATISTIC RCP TIME EA 10 MIN

## 2025-07-18 PROCEDURE — 120N000005 HC R&B MS OVERFLOW UMMC

## 2025-07-18 PROCEDURE — 83735 ASSAY OF MAGNESIUM: CPT

## 2025-07-18 PROCEDURE — 94640 AIRWAY INHALATION TREATMENT: CPT | Mod: 76

## 2025-07-18 PROCEDURE — 250N000009 HC RX 250

## 2025-07-18 PROCEDURE — 250N000012 HC RX MED GY IP 250 OP 636 PS 637: Performed by: NURSE PRACTITIONER

## 2025-07-18 PROCEDURE — 250N000011 HC RX IP 250 OP 636

## 2025-07-18 PROCEDURE — 99233 SBSQ HOSP IP/OBS HIGH 50: CPT | Mod: GC | Performed by: STUDENT IN AN ORGANIZED HEALTH CARE EDUCATION/TRAINING PROGRAM

## 2025-07-18 PROCEDURE — 99233 SBSQ HOSP IP/OBS HIGH 50: CPT | Performed by: STUDENT IN AN ORGANIZED HEALTH CARE EDUCATION/TRAINING PROGRAM

## 2025-07-18 RX ORDER — MAGNESIUM OXIDE 400 MG/1
400 TABLET ORAL EVERY 4 HOURS
Status: COMPLETED | OUTPATIENT
Start: 2025-07-18 | End: 2025-07-18

## 2025-07-18 RX ADMIN — MAGNESIUM OXIDE TAB 400 MG (241.3 MG ELEMENTAL MG) 400 MG: 400 (241.3 MG) TAB at 13:57

## 2025-07-18 RX ADMIN — EMPAGLIFLOZIN 10 MG: 10 TABLET, FILM COATED ORAL at 16:01

## 2025-07-18 RX ADMIN — NYSTATIN 500000 UNITS: 100000 SUSPENSION ORAL at 08:32

## 2025-07-18 RX ADMIN — Medication 12.5 MG: at 19:54

## 2025-07-18 RX ADMIN — ACETAMINOPHEN 650 MG: 325 TABLET ORAL at 14:02

## 2025-07-18 RX ADMIN — ENOXAPARIN SODIUM 40 MG: 40 INJECTION SUBCUTANEOUS at 16:04

## 2025-07-18 RX ADMIN — MAGNESIUM OXIDE TAB 400 MG (241.3 MG ELEMENTAL MG) 400 MG: 400 (241.3 MG) TAB at 10:25

## 2025-07-18 RX ADMIN — Medication 1 TABLET: at 12:18

## 2025-07-18 RX ADMIN — Medication 5 MG: at 21:33

## 2025-07-18 RX ADMIN — Medication 5 MG: at 00:25

## 2025-07-18 RX ADMIN — ACETAMINOPHEN 650 MG: 325 TABLET ORAL at 08:41

## 2025-07-18 RX ADMIN — GUAIFENESIN 200 MG: 200 SOLUTION ORAL at 18:33

## 2025-07-18 RX ADMIN — FLUTICASONE FUROATE AND VILANTEROL TRIFENATATE 1 PUFF: 100; 25 POWDER RESPIRATORY (INHALATION) at 08:33

## 2025-07-18 RX ADMIN — GUAIFENESIN 200 MG: 200 SOLUTION ORAL at 08:41

## 2025-07-18 RX ADMIN — POTASSIUM & SODIUM PHOSPHATES POWDER PACK 280-160-250 MG 1 PACKET: 280-160-250 PACK at 13:57

## 2025-07-18 RX ADMIN — IPRATROPIUM BROMIDE AND ALBUTEROL SULFATE 3 ML: .5; 3 SOLUTION RESPIRATORY (INHALATION) at 12:38

## 2025-07-18 RX ADMIN — IPRATROPIUM BROMIDE AND ALBUTEROL SULFATE 3 ML: .5; 3 SOLUTION RESPIRATORY (INHALATION) at 19:57

## 2025-07-18 RX ADMIN — IPRATROPIUM BROMIDE AND ALBUTEROL SULFATE 3 ML: .5; 3 SOLUTION RESPIRATORY (INHALATION) at 16:14

## 2025-07-18 RX ADMIN — ISOSORBIDE MONONITRATE 60 MG: 60 TABLET, EXTENDED RELEASE ORAL at 08:31

## 2025-07-18 RX ADMIN — ENTECAVIR 0.5 MG: 0.5 TABLET ORAL at 08:42

## 2025-07-18 RX ADMIN — POTASSIUM & SODIUM PHOSPHATES POWDER PACK 280-160-250 MG 1 PACKET: 280-160-250 PACK at 18:27

## 2025-07-18 RX ADMIN — IPRATROPIUM BROMIDE AND ALBUTEROL SULFATE 3 ML: .5; 3 SOLUTION RESPIRATORY (INHALATION) at 09:10

## 2025-07-18 RX ADMIN — ASPIRIN 81 MG CHEWABLE TABLET 81 MG: 81 TABLET CHEWABLE at 08:31

## 2025-07-18 RX ADMIN — NYSTATIN 500000 UNITS: 100000 SUSPENSION ORAL at 12:18

## 2025-07-18 RX ADMIN — PREDNISONE 5 MG: 5 TABLET ORAL at 08:31

## 2025-07-18 RX ADMIN — POTASSIUM & SODIUM PHOSPHATES POWDER PACK 280-160-250 MG 1 PACKET: 280-160-250 PACK at 10:25

## 2025-07-18 RX ADMIN — ACETAMINOPHEN 650 MG: 325 TABLET ORAL at 00:25

## 2025-07-18 RX ADMIN — NYSTATIN 500000 UNITS: 100000 SUSPENSION ORAL at 19:54

## 2025-07-18 RX ADMIN — GUAIFENESIN 200 MG: 200 SOLUTION ORAL at 23:31

## 2025-07-18 RX ADMIN — FLUOXETINE HYDROCHLORIDE 60 MG: 40 CAPSULE ORAL at 08:30

## 2025-07-18 RX ADMIN — NALTREXONE HYDROCHLORIDE 50 MG: 50 TABLET, FILM COATED ORAL at 08:31

## 2025-07-18 RX ADMIN — GUAIFENESIN 200 MG: 200 SOLUTION ORAL at 14:03

## 2025-07-18 RX ADMIN — GUAIFENESIN 200 MG: 200 SOLUTION ORAL at 03:49

## 2025-07-18 RX ADMIN — MYCOPHENOLATE MOFETIL 500 MG: 250 CAPSULE ORAL at 18:27

## 2025-07-18 RX ADMIN — PANTOPRAZOLE SODIUM 40 MG: 40 TABLET, DELAYED RELEASE ORAL at 08:31

## 2025-07-18 RX ADMIN — NYSTATIN 500000 UNITS: 100000 SUSPENSION ORAL at 16:04

## 2025-07-18 RX ADMIN — MYCOPHENOLATE MOFETIL 500 MG: 250 CAPSULE ORAL at 08:31

## 2025-07-18 RX ADMIN — ROSUVASTATIN CALCIUM 20 MG: 20 TABLET, FILM COATED ORAL at 08:31

## 2025-07-18 RX ADMIN — ACETAMINOPHEN 650 MG: 325 TABLET ORAL at 23:32

## 2025-07-18 RX ADMIN — ACETAMINOPHEN 650 MG: 325 TABLET ORAL at 18:33

## 2025-07-18 ASSESSMENT — ACTIVITIES OF DAILY LIVING (ADL)
ADLS_ACUITY_SCORE: 47

## 2025-07-18 NOTE — PLAN OF CARE
/74 (BP Location: Left arm)   Pulse 84   Temp 98  F (36.7  C) (Oral)   Resp 18   Wt 70.2 kg (154 lb 11.2 oz)   SpO2 93%   BMI 24.23 kg/m      Shift: 3220-1844  Isolation Status: droplet   VS: stable on 2L, afebrile  Neuro: Aox4  Behaviors: none   Respiratory: SOB with exertion   Cardiac: WNL  Pain/Nausea: denied pain and nausea   PRN: none  Diet: Low fat Na+   IV Access: PIV SL   Infusion(s): none   Lines/Drains: none   GI/: voids urinal   Skin: bruised   Mobility: SBA  Plan: Home O2 assessment was completed O2 went to 83% with activities on RA.      Goal Outcome Evaluation:      Plan of Care Reviewed With: patient    Overall Patient Progress: improvingOverall Patient Progress: improving

## 2025-07-18 NOTE — PLAN OF CARE
Goal Outcome Evaluation:      Plan of Care Reviewed With: patient    Overall Patient Progress: improving    Outcome Evaluation: Stable on 4L O2    /70 (BP Location: Left arm)   Pulse 85   Temp 98.9  F (37.2  C) (Oral)   Resp 20   Wt 71.3 kg (157 lb 3.2 oz)   SpO2 95%   BMI 24.62 kg/m      Shift: 4301-5136  Isolation Status: droplet   VS: Higher RR in the 20s other wise VSS.   Neuro: Aox4  Behaviors: receptive to cares; calls appropriately   BG: N/A  Labs: morning labs pending   Respiratory: Coarse lung sounds.4L O2 overnight >93%  Cardiac: WDL  Pain/Nausea: endorsed 3/10 pain   PRN: tylenol x1, robitussin x2  Diet: low saturated fat, NA <2400mg, no caffeine   IV Access: R PIV   Infusion(s): SL   GI/: Voids spontaneously.  LBM 7/17  Skin: Generalized bruising   Mobility: SBA with walker   Plan: Continue with POC

## 2025-07-18 NOTE — CARE PLAN
07/18/25 1707   Home Oxygen Assessment (RN/RT ONLY)   Does patient have oxygen at home? No   1. SpO2 on room air at rest while awake 87       Oxygen LPM at rest 2   3. SpO2 on room air during Activity/with exercise 83   4. SpO2 with oxygen during activity/with exercise 88       Oxygen LPM during activity/with exercise 2   Does patient qualify for Home O2? Yes

## 2025-07-18 NOTE — PROGRESS NOTES
[facility]    Progress Note - Medicine Service, MAROON TEAM 2       Date of Admission:  7/9/2025    Assessment & Plan   Jerad Ross is a 65 year old male presenting with SOB/dizzy. PMHx CABG, chronic Hep B, renal transplant (1993/1978) on chronic immunosuppression, restrictive lung disease admitted for AHRF and ?decompensated HFpEF. CT chest c/f atypical PNA versus ARDS. Also, some concern for decompensated HFpEF, treated with IV lasix 60 mg but this was complicated by orthostatic hypoTN. But improved gradually without diuresis, yielding some other etiology than pulmonary edema.    Changes Today:  - Wean O2 as able  - Home O2 assessment  - Initiated SGLT2 inhibitor for HFpEF  - Encourage ambulation as able  - PT to see patient    #AHRF 2/2 unclear etiology  #c/f acute decompensated heart failure with preserved EF  #C/f viral vs atypical pneumonia  Patient with acute hypoxemic respiratory failure and 3-day history of dyspnea and nonproductive cough, requiring BiPAP on arrival. Initial workup suggested viral or atypical pneumonia (mild leukocytosis, elevated inflammatory markers, bilateral ground-glass opacities on CT). RPP positive for rhinovirus/enterovirus--an uncommon cause of severe LRTI, though possible in immunocompromised patients. Urine antigens for Strep pneumo and Legionella were negative. Empirically treated with IV ceftriaxone and doxycycline for 5 days without improvement. Persistent hypoxia raised concern for a cardiogenic component. CXR showed worsening pulmonary edema with elevated BNP (1800); TTE on 7/14 showed preserved LVEF (55-60%) and mild RV dysfunction, consistent with HFpEF and acute pulmonary edema. Trial of IV furosemide yielded minimal improvement and led to symptomatic orthostatic hypotension, suggesting euvolemia. Given worsening CXR on 7/17, infectious alveolar edema is now favored over cardiogenic pulmonary edema though could be concurrent.  He has weaned off of HFNC to 2L NC without Lasix.     Plan:  - No indication for IV lasix as his oxygenation status has improved without it  - Goal to wean from 4L NC to 2L NC by 7/19 before discharge   - Considering TCU vs home with assistance  - Will need pulm follow up outpatient with updated PFTs and repeat CT chest non contrast in 4-6 weeks.  - Infectious work up thus far:   Respiratory viral panel (positive for rhino/entero)  COVID, Influenza (negative)  Sputum culture 7/9 contaminated; will try for repeat collection 7/12   Urine histo and blasto ab, ag (negative)  Urine strep and legionella (negative)  B d glucan and aspergillosis (negative)  Procalcitonin 0.16  Repeat procalcitonin 0.06  MRSA swab (negative)    #Acute decompensated heart failure  #HFpEF (LVEF 55-60%)  #Hx of CAD s/p CABG  #Hx NSTEMI  History of NSTEMI (2014) with prior TTE (8/2023) showing LVEF 60-65%. Current concern for acute decompensated heart failure with preserved ejection fraction (HFpEF) given pulmonary edema on CXR and elevated BNP. IV diuresis initiated. TTE on 7/14 showed preserved LVEF (55-60%) with normal LV function, mildly reduced RV function (slightly worse than 2023), normal IVC with preserved respiratory variation, grade I diastolic dysfunction, and no significant valvular or pericardial abnormalities -- overall consistent with HFpEF. However, PT exam on 7/16 revealed orthostatic hypotension (SBP ~80s), prompting concern for a euvolemic state. Lasix held. Worsening bilateral pulmonary edema seen on 7/16 CXR, but patient clinically improved with reduced oxygen requirements (now on nasal cannula). Current impression is that pulmonary edema is more likely secondary to an infectious/inflammatory process rather than cardiogenic overload, though likely concurrent given recent ECHO.     Plan:  - Holding IV Lasix; nephrology consulted regarding discharge planning  - Started empagliflozin 10mg daily  - Dry weight determined at  157lb  - Lasix 20mg PO PRN for 5 pound weight gain above dry weight.     #COPD exacerbation   Reviewed PFTs from 4/2018 which showed moderate restriction with FVC 63%. Reports a 5 pack year smoking history. Unclear if patient has obstructive lung disease. Diagnosis of COPD appears to come from inaccurate health form filled out by patient years ago. Ideally get updated PFTs and DCLO testing. Interestingly he has had improvement with Bipap, but did not have evidence of CO2 retention on gases suggesting less obstructive pathology.      Plan:  -Incentive spirometry   -Duonebs q4h while awake  -Will continue PTA COPD inhalers for now  -PTA Breo Ellipta 1 puff/day  -PTA albuterol PRN     #Hx of renal transplant   #chronic immunosuppression  Hx of renal failure on dialysis at age 16, and kidney transplant, infected with hepatitis B from dialysis. Stable kidney function this admission.    Plan:  Reduce PTA Mycophenolate 500mg BID (decreased dose for 5 days starting 7/10-7/14*, per nephrology)   PTA Prednisone 5mg daily      #Oral Thrush, improving  Pt noticed oral thrush and observed on buccal mucosa and tongue.  Ordered Nystatin.    #Lactic acidosis, resolved    Likely secondary to severe infection in setting of AHRF. LA on admission 3.3 --> 2.5 --> 1.4.      #Elevated troponins, reolved  #Type 2 demand ischemia   Likely secondary to demand ischemia in setting of severe infection and respiratory failure. Without chest pain or signs of fluid overload on exam. EKG without acute ischemic changes. POC ECHO without pericardial effusion and normal appearing ejection fraction. 7/14 ECHO with normal global and regional left ventricular function with an LVEF of 55-60%.      Chronic Medical Problems:    #HTN, controlled  Plan:  PTA Metoprolol succinate 12.5mg daily  PTA Imdur 60 mg daily    #HLD  Plan:  -rosuvastatin 20mg daily    #Chronic hepatitis B  Plan:  -entecavir 0.5mg daily     #GERD  Plan:  -Pantoprazole 40mg daily      #KIM  Plan:  PTA Atarax 10-25mg PRN  PTA Fluoxetine 20mg daily  PTA Fluoxetine 40mg daily        Diet: Combination Diet Low Saturated Fat Na <2400mg Diet, No Caffeine Diet    DVT Prophylaxis: Enoxaparin (Lovenox) SQ  [place] Catheter: Not present  Fluids: none  Lines: None     Cardiac Monitoring: None  Code Status: No CPR- Do NOT Intubate      Clinically Significant Risk Factors         # Hyponatremia: Lowest Na = 132 mmol/L in last 2 days, will monitor as appropriate  # Hypochloremia: Lowest Cl = 93 mmol/L in last 2 days, will monitor as appropriate      # Hypoalbuminemia: Lowest albumin = 2.9 g/dL at 7/17/2025  5:27 AM, will monitor as appropriate     # Hypertension: Noted on problem list                       Social Drivers of Health   Tobacco Use: Medium Risk (6/30/2025)    Patient History     Smoking Tobacco Use: Former     Smokeless Tobacco Use: Former     Passive Exposure: Never   Physical Activity: Insufficiently Active (11/5/2024)    Exercise Vital Sign     Days of Exercise per Week: 5 days     Minutes of Exercise per Session: 20 min   Stress: Stress Concern Present (11/5/2024)    [employer] - Occupational Stress Questionnaire     Feeling of Stress : To some extent   Social Connections: Unknown (11/5/2024)    Social Connection and Isolation Panel [NHANES]     Frequency of Social Gatherings with Friends and Family: More than three times a week         Disposition Plan     Medically Ready for Discharge: Anticipated in 2-4 Days       The patient's care was discussed with the Attending Physician, Dr. Jaime.    [facility] Service, [address]  Securely message with [employer] (more info)  Text page via [facility] Paging/Directory   See signed in provider for up to date coverage information    Resident/Fellow Attestation   I, Alona Whalen DO, was present with the medical/ALISHA student who participated in the service  and in the documentation of the note.  I have verified the history and personally performed the physical exam and medical decision making.  I agree with the assessment and plan of care as documented in the note.      Patient continues to recover clinically. Requiring less oxygen. Continue to wean as able. Encourage ambulation as able.    Alona Whalen DO  PGY1  Date of Service (when I saw the patient): 07/18/25  ____________________________________________________________________    Interval History   No acute events overnight. Feeling less fatigue from yesterday. Feels like is respiratory effort is less labored. Does have a frequent dry cough. Denies fever, chills, chest pain, dizziness or syncope. Overall improvement from yesterday and compared to admission.    Physical Exam   Vital Signs: Temp: 97.4  F (36.3  C) Temp src: Oral BP: 112/74 Pulse: 72   Resp: 20 SpO2: 100 % O2 Device: Nasal cannula Oxygen Delivery: 4 LPM  Weight: 157 lbs 3.2 oz    .Physical Exam  Vitals reviewed.   Constitutional:       General: He is not in acute distress.  HENT:      Head: Normocephalic and atraumatic.   Cardiovascular:      Rate and Rhythm: Normal rate and regular rhythm.      Pulses: Normal pulses.      Comments: No lower extremity edema.  Pulmonary:      Effort: No respiratory distress.      Breath sounds: Wheezing present.      Comments: Found on 4L NC. Fine, improved crackles heard bilaterally in lower lung fields.  Abdominal:      Palpations: Abdomen is soft.   Musculoskeletal:         General: No swelling.   Neurological:      General: No focal deficit present.       Medical Decision Making       Please see A&P for additional details of medical decision making.      Data     I have personally reviewed the following data over the past 24 hrs:    N/A  \   N/A   / N/A     133 (L) 93 (L) 11.8 /  85   4.0 29 0.72 \       Imaging results reviewed over the past 24 hrs:   No results found for this or any previous visit (from the past  24 hours).

## 2025-07-18 NOTE — PROGRESS NOTES
[facility]  Transplant Nephrology Progress Note  Date of Admission:  7/9/2025  Today's Date: 07/18/2025    Recommendations:   - Okay to use SGLT2  - EDW of ~155 lbs.  - Recommend Lasix 20 mg as needed if he increases 5 lbs in a few days.  - Daily weights and blood pressure.   - No changes to IS.    Assessment & Plan   # DDKT: Stable creatinine, at baseline. Good urine output.     - Baseline Creatinine: ~ 0.7-0.9   - Proteinuria: Normal (<0.2 grams)   - DSA Hx: Not checked recently due to time from transplant   - Last cPRA: unknown%   - BK Viremia: Not checked recently due to time from transplant   - Kidney Tx Biopsy Hx: No biopsy history.    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     # Immunosuppression Prior to Admission: Mycophenolate mofetil (dose 750 mg every 12 hours) and Prednisone (dose 5 mg daily)   - Present Immunosuppression: Mycophenolate mofetil (dose 500 mg every 12 hours) and Prednisone (dose 5 mg daily)    - Induction with Recent Transplant:  Not known due to time from transplant   - Continue with intensive monitoring of immunosuppression for efficacy and toxicity.   - Historical Changes in Immunosuppression: None   - Changes: Not at this time    # Infection Prevention:   Last CD4 Level: Not checked recently  - PJP: None      - CMV IgG Ab High Risk Discordance (D+/R-) at time of transplant: Unknown  Present CMV Serostatus: Unknown  - EBV IgG Ab High Risk Discordance (D+/R-) at time of transplant: Unknown  Present EBV Serostatus: Unknown    # Hypertension: Controlled;  Goal BP: < 140/90 (Hospitalization goal)   - Changes: Not at this time    # Anemia in Chronic Renal Disease: Hgb: Stable      CAROL ANN: No   - Iron studies: Not checked recently    # Mineral Bone Disorder:    - Calcium; level: Normal        On supplement: Yes; calcium citrate 315 mg daily.  - Phosphorus; level: Normal        On supplement: No    # Electrolytes:  - Potassium;  level: Normal        On supplement: No  - Magnesium; level: Normal        On supplement: No  - Bicarbonate; level: Normal        On supplement: No  - Sodium; level: Normal    # Acute hypoxic respiratory failure:  # Pulmonary edema:  # Viral versus Atypical Pneumonia: + Rhino/enterovirus. CT PE negative. Gram + Bcx on 07/09, likely contaminant. Repeat blood cultures 07/10. Sputum culture 7/12 likely contaminated, per report. Repeat pending. CXR 7/13 with pulmonary edema. Started on ceftriaxone and currently on prednisone 40 mg daily and doxycycline.    - CXR 7/16 with pulmonary edema and bilateral pleural effusions.   - Management per primary team. Pulmonology following.    # Acute decompensated heart failure: echo in 2023 with no evidence of HF. BNP 1800 on 7/13. PTA on 20 mg lasix PO daily.    - Echo 7/14; EF 55-65% with grade I diastolic dysfunction. No pericardial effusion.   - Management per primary team.     # Other Significant PMH:   - CAD, s/p CABG: Last cardiac stress test was abnormal in 6/2023 (but unchanged from prior testing in 2022).  Coronary angiogram 9/2021 that showed some possible obstructive disease in the 1st diagonal, but unable to stent it.  Bypass grafts were open.  Plan is for medical management. Last cardiac echo (8/2023) showed LVEF ~ 60-65%. Normal right ventricular systolic function. Normal diastolic dysfunction.    - Provoked PE (8/2023): after right hip revision surgery. Completed AC.   - Chronic Hepatitis B: Stable on entecavir. Follows here with hepatology.   - Asthma: Some increase in shortness of breath, but felt more cardiac in origin.  Patient is stable on inhalers.   - GERD: Asymptomatic on pantoprazole, but with h/o ulcer, will continue on medication.   - Skin Cancer: SCCIS, right lateral chest, s/p bx 5/9/24, s/p MMS on 8/5/24     # Transplant History:  Etiology of Kidney Failure: Focal segmental glomerulosclerosis (FSGS)  Tx: DDKT  Transplant: 10/6/1993 (Kidney), 4/18/1978  (Kidney)  Significant transplant-related complications: Recurrent Skin Cancers    Recommendations were communicated to the primary team via [employer].    Niraj Freire MD  Transplant Nephrology    Interval History  No events overnight.  Decreasing supplemental oxygen requirements. Still with cough but improved breathing status. He doesn't report leg swelling or orthopnea. Stable blood pressure without lightheadedness.     Review of Systems   4 point ROS was obtained and negative except as noted in the Interval History.    MEDICATIONS:  Current Facility-Administered Medications   Medication Dose Route Frequency Provider Last Rate Last Admin    aspirin (ASA) chewable tablet 81 mg  81 mg Oral Daily Whalen, Alona, DO   81 mg at 07/17/25 0802    calcium citrate-vitamin D (CITRACAL) 315-6.25 MG-MCG per tablet 1 tablet  1 tablet Oral Daily with lunch Blake Plasencia MD   1 tablet at 07/17/25 1152    enoxaparin ANTICOAGULANT (LOVENOX) injection 40 mg  40 mg Subcutaneous Q24H Whalen, Alona, DO   40 mg at 07/17/25 1559    entecavir (BARACLUDE) tablet 0.5 mg  0.5 mg Oral Daily Whalen, Alona, DO   0.5 mg at 07/17/25 0803    FLUoxetine (PROzac) capsule 60 mg  60 mg Oral Daily Blake Plasencia MD   60 mg at 07/17/25 0803    fluticasone-vilanterol (BREO ELLIPTA) 100-25 MCG/ACT inhaler 1 puff  1 puff Inhalation Daily Whalen, Alona, DO   1 puff at 07/17/25 1018    ipratropium - albuterol 0.5 mg/2.5 mg (3mg)/3 mL (DUONEB) neb solution 3 mL  3 mL Nebulization Q4H WA Whalen, Alona, DO   3 mL at 07/17/25 1959    isosorbide mononitrate (IMDUR) 24 hr tablet 60 mg  60 mg Oral Daily Whalen, Alona, DO   60 mg at 07/17/25 0802    metoprolol succinate ER (TOPROL-XL) 24 hr half-tab 12.5 mg  12.5 mg Oral Daily Whalen, Alona, DO   12.5 mg at 07/17/25 2100    multivitamin w/minerals (THERA-VIT-M) tablet 1 tablet  1 tablet Oral Daily with lunch Blake Plasencia MD   1 tablet at 07/17/25 1152    mycophenolate (GENERIC EQUIVALENT) capsule 500 mg  500 mg Oral BID  IS Lisa Levine, RONALDO CNP   500 mg at 25 1843    naltrexone (DEPADE/REVIA) tablet 50 mg  50 mg Oral Daily Whalen, Alona, DO   50 mg at 25 0802    nystatin (MYCOSTATIN) suspension 500,000 Units  500,000 Units Swish & Swallow 4x Daily Blake Plasencia MD   500,000 Units at 25 2100    pantoprazole (PROTONIX) EC tablet 40 mg  40 mg Oral Daily Whalen, Alona, DO   40 mg at 25 0802    polyethylene glycol (MIRALAX) Packet 17 g  17 g Oral Daily Krumlinda Michael, DO   17 g at 25 0802    predniSONE (DELTASONE) tablet 5 mg  5 mg Oral Daily Ryan Isbell MD   5 mg at 25 0803    psyllium (METAMUCIL/KONSYL) Packet 1 packet  1 packet Oral Daily Blake Plasencia MD   1 packet at 25 0808    rosuvastatin (CRESTOR) tablet 20 mg  20 mg Oral Daily Whalen, Alona, DO   20 mg at 25 0803    sodium chloride (PF) 0.9% PF flush 3 mL  3 mL Intracatheter Q8H NE Whalen, Alona, DO   3 mL at 25 1559     Current Facility-Administered Medications   Medication Dose Route Frequency Provider Last Rate Last Admin       Physical Exam   Temp  Av.3  F (36.8  C)  Min: 97.8  F (36.6  C)  Max: 98.8  F (37.1  C)      Pulse  Av  Min: 63  Max: 80 Resp  Av.9  Min: 18  Max: 44  FiO2 (%)  Av.2 %  Min: 50 %  Max: 65 %  SpO2  Av.2 %  Min: 90 %  Max: 99 %     /74 (BP Location: Left arm)   Pulse 72   Temp 97.4  F (36.3  C) (Oral)   Resp 20   Wt 71.3 kg (157 lb 3.2 oz)   SpO2 100%   BMI 24.62 kg/m      Admit       GENERAL APPEARANCE: alert and no distress  HENT: mouth without ulcers or lesions  RESP: bilateral crackles and rhonchi.   CV: regular rhythm, normal rate, no rub, no murmur  EDEMA: no LE edema bilaterally  ABDOMEN: soft, nondistended, nontender  MS: extremities normal - no gross deformities noted, no evidence of inflammation in joints, no muscle tenderness  SKIN: no rash, numerous keratoses  TX KIDNEY: normal  DIALYSIS ACCESS: LUE AV Fistula (No thrill)    Data   All labs  reviewed by me.  CMP  Recent Labs   Lab 07/18/25  0624 07/17/25  0527 07/16/25  0533 07/15/25  1541 07/15/25  0613 07/14/25  1332   * 132* 135  --  137 135   POTASSIUM 4.0 3.8 3.5 3.6 3.9 4.0   CHLORIDE 93* 93* 93*  --  96* 96*   CO2 29 27 29  --  27 26   ANIONGAP 11 12 13  --  14 13   GLC 85 85 85  --  86 226*   BUN 11.8 18.0 23.2*  --  24.8* 20.9   CR 0.72 0.64* 0.68  --  0.68 0.70   GFRESTIMATED >90 >90 >90  --  >90 >90   HEMA 9.5 9.6 9.8  --  9.8 9.7   MAG 2.0 1.9 1.8 1.7 1.9 1.8   PHOS 2.4* 3.0 3.6 4.0 3.2 2.7   ALBUMIN  --  2.9* 3.2*  --  3.2* 3.2*     CBC  Recent Labs   Lab 07/13/25  1114   HGB 12.7*   WBC 11.6*   RBC 4.48   HCT 40.3   MCV 90   MCH 28.3   MCHC 31.5   RDW 15.2*        INR  No lab results found in last 7 days.    ABG  No lab results found in last 7 days.     Urine Studies  Recent Labs   Lab Test 07/09/25  1346 08/30/23  1856 08/20/23  1245 08/11/23  1928 06/03/20  1307   COLOR Yellow Yellow Light Yellow Light Yellow Yellow   APPEARANCE Slightly Cloudy* Clear Clear Clear Clear   URINEGLC Negative Negative Negative Negative Negative   URINEBILI Negative Negative Negative Negative Negative   URINEKETONE Trace* 10* 60* 10* Negative   SG 1.015 1.021 1.015 1.009 1.008   UBLD Negative Negative Negative Negative Negative   URINEPH 6.0 5.0 6.0 6.0 6.5   PROTEIN 20* 10* 20* 10* Negative   UROBILINOGEN  --   --   --   --  <2.0 E.U./dL   NITRITE Negative Negative Negative Negative Negative   LEUKEST Negative Negative Negative Negative Negative   RBCU 1 <1 <1 0  --    WBCU 7* 3 1 3  --      Recent Labs   Lab Test 10/28/20  0902 10/01/19  0942 04/02/19  0917 10/23/18  1122 06/21/18  1209   UTPG 0.23* 0.26* 0.23* 0.21* 0.12     PTH  No lab results found.  Iron Studies  Recent Labs   Lab Test 10/02/23  0943   IRON 30*      IRONSAT 10*   LA 50       IMAGING:  All imaging studies reviewed by me.

## 2025-07-18 NOTE — PLAN OF CARE
Goal Outcome Evaluation:      Plan of Care Reviewed With: patient    Overall Patient Progress: improvingOverall Patient Progress: improving    Outcome Evaluation: Decreased oxygen needs this shift.    /76 (BP Location: Left arm)   Pulse 87   Temp 97.8  F (36.6  C) (Oral)   Resp (!) (P) 35   Wt 71.3 kg (157 lb 3.2 oz)   SpO2 96%   BMI 24.62 kg/m      Shift: 7420-7818  Isolation Status: Droplet for Rhino  VS: VSS on 3L NC, afebrile  Neuro: Aox4  Behaviors: Calm, pleasant  BG: None  Labs: K 3.8, replaced PO. Mg 1.9, replaced PO. Phos 3.0, redraw in morning.  Respiratory: Coarse LS, some wheezes. Attempting to wean down O2 needs. Respiration rate 35 at 1800.  Cardiac: WDL  Pain/Nausea: Denies nausea. C/o headache this shift  PRN: Tylenol x1. Robitussin x1  Diet: Regular, tolerating well  IV Access: R PIV SL  Infusion(s): n/a  Lines/Drains: n/a  GI/: Voiding. BM this shift  Skin: Generalized scabbing and bruising  Mobility: SBA with walker  Events/Education: Worked with PT today, Encouraging to walk  Plan: Continue with POC and update providers of changes.

## 2025-07-18 NOTE — PROGRESS NOTES
Shift: 7649-6991  Isolation Status: droplet   VS: Higher RR in the 20s other wise VSS.   Neuro: Aox4  Behaviors: receptive to cares; calls appropriately   BG: N/A  Labs: morning labs: replaced magnesium and phosphorous and recheck will be tomorrow.  Respiratory: Coarse lung sounds, on 2 liters of oxygen and sating 94%  Cardiac: WDL  Pain/Nausea: endorsed 3/10 pain   PRN: tylenol x 2, robitussin x 2  Diet: low saturated fat, NA <2400mg, no caffeine   IV Access: R PIV SL  GI/: Voids spontaneously.  LBM 7/18  Skin: Generalized bruising   Mobility: SBA with walker   Plan: Continue with POC

## 2025-07-19 ENCOUNTER — APPOINTMENT (OUTPATIENT)
Dept: PHYSICAL THERAPY | Facility: CLINIC | Age: 65
DRG: 193 | End: 2025-07-19
Payer: COMMERCIAL

## 2025-07-19 LAB
ANION GAP SERPL CALCULATED.3IONS-SCNC: 8 MMOL/L (ref 7–15)
BASE EXCESS BLDV CALC-SCNC: 7.9 MMOL/L (ref -3–3)
BUN SERPL-MCNC: 9 MG/DL (ref 8–23)
CALCIUM SERPL-MCNC: 9.1 MG/DL (ref 8.8–10.4)
CHLORIDE SERPL-SCNC: 96 MMOL/L (ref 98–107)
CREAT SERPL-MCNC: 0.66 MG/DL (ref 0.67–1.17)
EGFRCR SERPLBLD CKD-EPI 2021: >90 ML/MIN/1.73M2
GLUCOSE SERPL-MCNC: 82 MG/DL (ref 70–99)
HCO3 BLDV-SCNC: 34 MMOL/L (ref 21–28)
HCO3 SERPL-SCNC: 31 MMOL/L (ref 22–29)
HOLD SPECIMEN: NORMAL
MAGNESIUM SERPL-MCNC: 2 MG/DL (ref 1.7–2.3)
O2/TOTAL GAS SETTING VFR VENT: 2 %
OXYHGB MFR BLDV: 90 % (ref 70–75)
PCO2 BLDV: 50 MM HG (ref 40–50)
PH BLDV: 7.43 [PH] (ref 7.32–7.43)
PHOSPHATE SERPL-MCNC: 3.2 MG/DL (ref 2.5–4.5)
PO2 BLDV: 59 MM HG (ref 25–47)
POTASSIUM SERPL-SCNC: 4 MMOL/L (ref 3.4–5.3)
SAO2 % BLDV: 92.4 % (ref 70–75)
SODIUM SERPL-SCNC: 135 MMOL/L (ref 135–145)

## 2025-07-19 PROCEDURE — 250N000012 HC RX MED GY IP 250 OP 636 PS 637

## 2025-07-19 PROCEDURE — 250N000013 HC RX MED GY IP 250 OP 250 PS 637

## 2025-07-19 PROCEDURE — 250N000012 HC RX MED GY IP 250 OP 636 PS 637: Performed by: NURSE PRACTITIONER

## 2025-07-19 PROCEDURE — 120N000005 HC R&B MS OVERFLOW UMMC

## 2025-07-19 PROCEDURE — 250N000009 HC RX 250

## 2025-07-19 PROCEDURE — 84100 ASSAY OF PHOSPHORUS: CPT

## 2025-07-19 PROCEDURE — 82805 BLOOD GASES W/O2 SATURATION: CPT

## 2025-07-19 PROCEDURE — 99232 SBSQ HOSP IP/OBS MODERATE 35: CPT | Mod: GC | Performed by: STUDENT IN AN ORGANIZED HEALTH CARE EDUCATION/TRAINING PROGRAM

## 2025-07-19 PROCEDURE — 999N000157 HC STATISTIC RCP TIME EA 10 MIN

## 2025-07-19 PROCEDURE — 250N000011 HC RX IP 250 OP 636

## 2025-07-19 PROCEDURE — 250N000013 HC RX MED GY IP 250 OP 250 PS 637: Performed by: STUDENT IN AN ORGANIZED HEALTH CARE EDUCATION/TRAINING PROGRAM

## 2025-07-19 PROCEDURE — 36415 COLL VENOUS BLD VENIPUNCTURE: CPT

## 2025-07-19 PROCEDURE — 99233 SBSQ HOSP IP/OBS HIGH 50: CPT | Performed by: INTERNAL MEDICINE

## 2025-07-19 PROCEDURE — 97530 THERAPEUTIC ACTIVITIES: CPT | Mod: GP

## 2025-07-19 PROCEDURE — 82310 ASSAY OF CALCIUM: CPT

## 2025-07-19 PROCEDURE — 94640 AIRWAY INHALATION TREATMENT: CPT

## 2025-07-19 PROCEDURE — 94640 AIRWAY INHALATION TREATMENT: CPT | Mod: 76

## 2025-07-19 PROCEDURE — 83735 ASSAY OF MAGNESIUM: CPT

## 2025-07-19 RX ORDER — MAGNESIUM OXIDE 400 MG/1
400 TABLET ORAL EVERY 4 HOURS
Status: COMPLETED | OUTPATIENT
Start: 2025-07-19 | End: 2025-07-19

## 2025-07-19 RX ORDER — CARBOXYMETHYLCELLULOSE SODIUM 5 MG/ML
1 SOLUTION/ DROPS OPHTHALMIC
Status: DISCONTINUED | OUTPATIENT
Start: 2025-07-19 | End: 2025-07-19

## 2025-07-19 RX ADMIN — ASPIRIN 81 MG CHEWABLE TABLET 81 MG: 81 TABLET CHEWABLE at 07:59

## 2025-07-19 RX ADMIN — MAGNESIUM OXIDE TAB 400 MG (241.3 MG ELEMENTAL MG) 400 MG: 400 (241.3 MG) TAB at 13:36

## 2025-07-19 RX ADMIN — ENOXAPARIN SODIUM 40 MG: 40 INJECTION SUBCUTANEOUS at 16:47

## 2025-07-19 RX ADMIN — PANTOPRAZOLE SODIUM 40 MG: 40 TABLET, DELAYED RELEASE ORAL at 08:00

## 2025-07-19 RX ADMIN — ACETAMINOPHEN 650 MG: 325 TABLET ORAL at 21:27

## 2025-07-19 RX ADMIN — NALTREXONE HYDROCHLORIDE 50 MG: 50 TABLET, FILM COATED ORAL at 08:00

## 2025-07-19 RX ADMIN — GUAIFENESIN 200 MG: 200 SOLUTION ORAL at 21:27

## 2025-07-19 RX ADMIN — ENTECAVIR 0.5 MG: 0.5 TABLET ORAL at 09:48

## 2025-07-19 RX ADMIN — Medication 5 MG: at 21:27

## 2025-07-19 RX ADMIN — EMPAGLIFLOZIN 10 MG: 10 TABLET, FILM COATED ORAL at 07:59

## 2025-07-19 RX ADMIN — IPRATROPIUM BROMIDE AND ALBUTEROL SULFATE 3 ML: .5; 3 SOLUTION RESPIRATORY (INHALATION) at 08:18

## 2025-07-19 RX ADMIN — PSYLLIUM HUSK 1 PACKET: 3.4 POWDER ORAL at 07:59

## 2025-07-19 RX ADMIN — PREDNISONE 5 MG: 5 TABLET ORAL at 08:01

## 2025-07-19 RX ADMIN — MYCOPHENOLATE MOFETIL 500 MG: 250 CAPSULE ORAL at 18:28

## 2025-07-19 RX ADMIN — HYDROXYZINE HYDROCHLORIDE 25 MG: 25 TABLET, FILM COATED ORAL at 02:04

## 2025-07-19 RX ADMIN — IPRATROPIUM BROMIDE AND ALBUTEROL SULFATE 3 ML: .5; 3 SOLUTION RESPIRATORY (INHALATION) at 12:20

## 2025-07-19 RX ADMIN — NYSTATIN 500000 UNITS: 100000 SUSPENSION ORAL at 08:01

## 2025-07-19 RX ADMIN — GUAIFENESIN 200 MG: 200 SOLUTION ORAL at 16:59

## 2025-07-19 RX ADMIN — Medication 1 TABLET: at 13:36

## 2025-07-19 RX ADMIN — MYCOPHENOLATE MOFETIL 500 MG: 250 CAPSULE ORAL at 07:59

## 2025-07-19 RX ADMIN — ACETAMINOPHEN 650 MG: 325 TABLET ORAL at 10:16

## 2025-07-19 RX ADMIN — ISOSORBIDE MONONITRATE 60 MG: 60 TABLET, EXTENDED RELEASE ORAL at 08:00

## 2025-07-19 RX ADMIN — MAGNESIUM OXIDE TAB 400 MG (241.3 MG ELEMENTAL MG) 400 MG: 400 (241.3 MG) TAB at 09:47

## 2025-07-19 RX ADMIN — IPRATROPIUM BROMIDE AND ALBUTEROL SULFATE 3 ML: .5; 3 SOLUTION RESPIRATORY (INHALATION) at 20:18

## 2025-07-19 RX ADMIN — FLUOXETINE HYDROCHLORIDE 60 MG: 40 CAPSULE ORAL at 08:00

## 2025-07-19 RX ADMIN — NYSTATIN 500000 UNITS: 100000 SUSPENSION ORAL at 13:36

## 2025-07-19 RX ADMIN — ROSUVASTATIN CALCIUM 20 MG: 20 TABLET, FILM COATED ORAL at 07:59

## 2025-07-19 RX ADMIN — Medication 12.5 MG: at 21:27

## 2025-07-19 RX ADMIN — NYSTATIN 500000 UNITS: 100000 SUSPENSION ORAL at 16:47

## 2025-07-19 RX ADMIN — GUAIFENESIN 200 MG: 200 SOLUTION ORAL at 10:17

## 2025-07-19 RX ADMIN — GUAIFENESIN 200 MG: 200 SOLUTION ORAL at 05:22

## 2025-07-19 RX ADMIN — FLUTICASONE FUROATE AND VILANTEROL TRIFENATATE 1 PUFF: 100; 25 POWDER RESPIRATORY (INHALATION) at 08:02

## 2025-07-19 RX ADMIN — IPRATROPIUM BROMIDE AND ALBUTEROL SULFATE 3 ML: .5; 3 SOLUTION RESPIRATORY (INHALATION) at 16:06

## 2025-07-19 ASSESSMENT — ACTIVITIES OF DAILY LIVING (ADL)
ADLS_ACUITY_SCORE: 47

## 2025-07-19 NOTE — PROGRESS NOTES
Care Management Follow Up    Length of Stay (days): 10    Expected Discharge Date: 07/21/2025     Concerns to be Addressed: discharge planning  TCU Recommended  Patient plan of care discussed at interdisciplinary rounds: No    Anticipated Discharge Disposition: Transitional Care  Referrals placed:     [facility]  [address] [place] [address]  P: [phone]  F: [phone]    [facility]  [address] [place] [address]  P: [phone]  F: [phone]     Anticipated Discharge Services: Transportation Services  Anticipated Discharge DME: Oxygen    Patient/family educated on Medicare website which has current facility and service quality ratings: yes  Education Provided on the Discharge Plan: Yes  Patient/Family in Agreement with the Plan: yes    Referrals Placed by CM/SW: Post Acute Facilities  Private pay costs discussed: Not applicable    Discussed  Partnership in Safe Discharge Planning  document with patient/family: Yes: Provided to pt bedside     Handoff Completed: No, handoff not indicated or clinically appropriate    Additional Information:  Additional Information:  Per page from MD this morning, pt could discharge today.    Writer met with pt to determine discharge plan. Per PT yesterday, recommendation is for TCU, but could progress to home with homecare depending on LOS. Pt states that he prefers TCU and provided choices. Made referrals, awaiting admissions review.    Per pt request, writer called pt's sister, Reva, to give an update on the discharge plan. Left message.    Next Steps:   [] Follow up with referrals  [x] [facility] - [id number]  [] Set up transport (family vs WC?)  [] IHO  [] [facility] Holden (Katie[facility]  Weekend Covering   [facility] Acute Care Management  Searchable on Vocera: 7A SOT SW, 7B MS SW

## 2025-07-19 NOTE — PLAN OF CARE
Goal Outcome Evaluation:        Problem: Adult Inpatient Plan of Care  Goal: Plan of Care Review  7/19/2025 1428 by Mellissa Medley RN  Outcome: Progressing  Flowsheets (Taken 7/19/2025 1428)  Outcome Evaluation: Drowsy, VSS unable to wean of 02,  Plan of Care Reviewed With: patient  Overall Patient Progress: improving  7/19/2025 1427 by Mellissa Medley RN  Outcome: Progressing     Problem: Gas Exchange Impaired  Goal: Optimal Gas Exchange  7/19/2025 1430 by Mellissa Medley RN  Outcome: Not Progressing  /70 (BP Location: Left arm)   Pulse 73   Temp 98.6  F (37  C) (Oral)   Resp 16   Wt 71.4 kg (157 lb 6.4 oz)   SpO2 95%   BMI 24.65 kg/m      Shift: 8062-9917  Isolation Status: Droplet  VS: VSS on 2L NC, afebrile, DNR/DNI   Neuro: Aox4  Behaviors: calm, pleasant, cooperative, drowsy but arousable   Labs/Imaging: K 4.0, Mg 2.0 phosphorus 3.2, VBG's-  Bicarb 34, PCO2 50, Ph 7.43  Respiratory: 2L NC, did not tolerate titration down on o2, Refused CPAP on NOC's  Cardiac: WDL   Pain/Nausea: Denies nausea, mild pain  PRN: Tylenol, guaifenesin  Diet: Low sat/fat 2400 mg sodium   LDA: R PIV  Infusion(s): NA  GI/: void 350 mL, no BM   Skin: Scattered bruising   Mobility: A-1  Events/Education: IS education   Plan: discharge to TCU when bed available, encourage IS, encourage ambulation, encourage fluids     Mellissa Medley RN on 7/19/2025 at 2:46 PM

## 2025-07-19 NOTE — PROGRESS NOTES
[facility]  Transplant Nephrology Progress Note  Date of Admission:  7/9/2025  Today's Date: 07/19/2025    Recommendations:   - Recommend Lasix 20 mg as needed if he increases 5 lbs in a few days.  - EDW of ~155 lbs.  - Daily weights and blood pressure.   - No changes to IS.    Assessment & Plan   # DDKT: Stable creatinine, at baseline. Good urine output.     - Baseline Creatinine: ~ 0.7-0.9   - Proteinuria: Normal (<0.2 grams)   - DSA Hx: Not checked recently due to time from transplant   - Last cPRA: unknown%   - BK Viremia: Not checked recently due to time from transplant   - Kidney Tx Biopsy Hx: No biopsy history.    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     # Immunosuppression Prior to Admission: Mycophenolate mofetil (dose 750 mg every 12 hours) and Prednisone (dose 5 mg daily)   - Present Immunosuppression: Mycophenolate mofetil (dose 500 mg every 12 hours) and Prednisone (dose 5 mg daily)    - Induction with Recent Transplant:  Not known due to time from transplant   - Continue with intensive monitoring of immunosuppression for efficacy and toxicity.   - Historical Changes in Immunosuppression: None   - Changes: Not at this time    # Infection Prevention:   Last CD4 Level: Not checked recently  - PJP: None      - CMV IgG Ab High Risk Discordance (D+/R-) at time of transplant: Unknown  Present CMV Serostatus: Unknown  - EBV IgG Ab High Risk Discordance (D+/R-) at time of transplant: Unknown  Present EBV Serostatus: Unknown    # Hypertension: Controlled;  Goal BP: < 140/90 (Hospitalization goal)   - Changes: Not at this time    # Anemia in Chronic Renal Disease: Hgb: Stable      CAROL ANN: No   - Iron studies: Not checked recently    # Mineral Bone Disorder:    - Calcium; level: Normal        On supplement: Yes; calcium citrate 315 mg daily.  - Phosphorus; level: Normal        On supplement: No    # Electrolytes:  - Potassium; level: Normal        On  supplement: No  - Magnesium; level: Normal        On supplement: No  - Bicarbonate; level: Normal        On supplement: No  - Sodium; level: Normal    # Acute hypoxic respiratory failure:  # Pulmonary edema:  # Viral versus Atypical Pneumonia: + Rhino/enterovirus. CT PE negative. Gram + Bcx on 07/09, likely contaminant. Repeat blood cultures 07/10. Sputum culture 7/12 likely contaminated, per report. Repeat pending. CXR 7/13 with pulmonary edema. Started on ceftriaxone and currently on prednisone 40 mg daily and doxycycline.    - CXR 7/16 with pulmonary edema and bilateral pleural effusions.   - Management per primary team. Pulmonology following.    # Acute decompensated heart failure: echo in 2023 with no evidence of HF. BNP 1800 on 7/13. PTA on 20 mg lasix PO daily.    - Echo 7/14; EF 55-65% with grade I diastolic dysfunction. No pericardial effusion.   - Management per primary team.     # Other Significant PMH:   - CAD, s/p CABG: Last cardiac stress test was abnormal in 6/2023 (but unchanged from prior testing in 2022).  Coronary angiogram 9/2021 that showed some possible obstructive disease in the 1st diagonal, but unable to stent it.  Bypass grafts were open.  Plan is for medical management. Last cardiac echo (8/2023) showed LVEF ~ 60-65%. Normal right ventricular systolic function. Normal diastolic dysfunction.    - Provoked PE (8/2023): after right hip revision surgery. Completed AC.   - Chronic Hepatitis B: Stable on entecavir. Follows here with hepatology.   - Asthma: Some increase in shortness of breath, but felt more cardiac in origin.  Patient is stable on inhalers.   - GERD: Asymptomatic on pantoprazole, but with h/o ulcer, will continue on medication.   - Skin Cancer: SCCIS, right lateral chest, s/p bx 5/9/24, s/p MMS on 8/5/24     # Transplant History:  Etiology of Kidney Failure: Focal segmental glomerulosclerosis (FSGS)  Tx: DDKT  Transplant: 10/6/1993 (Kidney), 4/18/1978 (Kidney)  Significant  transplant-related complications: Recurrent Skin Cancers    Recommendations were communicated to the primary team via Vocera.    Harriet Hyde MD  Transplant Nephrology    Interval History  No events overnight.  Decreasing supplemental oxygen requirements. Still with cough but improved breathing status. He doesn't report leg swelling or orthopnea. Stable blood pressure without lightheadedness.     Review of Systems   4 point ROS was obtained and negative except as noted in the Interval History.    MEDICATIONS:  Current Facility-Administered Medications   Medication Dose Route Frequency Provider Last Rate Last Admin    aspirin (ASA) chewable tablet 81 mg  81 mg Oral Daily Whalen, Alona, DO   81 mg at 07/19/25 0759    calcium citrate-vitamin D (CITRACAL) 315-6.25 MG-MCG per tablet 1 tablet  1 tablet Oral Daily with lunch Blake Plasencia MD   1 tablet at 07/18/25 1218    empagliflozin (JARDIANCE) tablet 10 mg  10 mg Oral Daily Whalen, Alona, DO   10 mg at 07/19/25 0759    enoxaparin ANTICOAGULANT (LOVENOX) injection 40 mg  40 mg Subcutaneous Q24H Whalen, Alona, DO   40 mg at 07/18/25 1604    entecavir (BARACLUDE) tablet 0.5 mg  0.5 mg Oral Daily Whalen, Alona, DO   0.5 mg at 07/18/25 0842    FLUoxetine (PROzac) capsule 60 mg  60 mg Oral Daily Blake Plasencia MD   60 mg at 07/19/25 0800    fluticasone-vilanterol (BREO ELLIPTA) 100-25 MCG/ACT inhaler 1 puff  1 puff Inhalation Daily Whalen, Alona, DO   1 puff at 07/19/25 0802    ipratropium - albuterol 0.5 mg/2.5 mg (3mg)/3 mL (DUONEB) neb solution 3 mL  3 mL Nebulization Q4H WA Whalen, Alona, DO   3 mL at 07/19/25 0818    isosorbide mononitrate (IMDUR) 24 hr tablet 60 mg  60 mg Oral Daily Whalen, Alona, DO   60 mg at 07/19/25 0800    magnesium oxide (MAG-OX) tablet 400 mg  400 mg Oral Q4H Rashid Jaime MD        metoprolol succinate ER (TOPROL-XL) 24 hr half-tab 12.5 mg  12.5 mg Oral Daily Whalen, Alona, DO   12.5 mg at 07/18/25 1954    multivitamin w/minerals (THERA-VIT-M) tablet 1  tablet  1 tablet Oral Daily with lunch Blake Plasencia MD   1 tablet at 25 1218    mycophenolate (GENERIC EQUIVALENT) capsule 500 mg  500 mg Oral BID IS Lisa Levine APRN CNP   500 mg at 25 0759    naltrexone (DEPADE/REVIA) tablet 50 mg  50 mg Oral Daily Whalen, Alona, DO   50 mg at 25 0800    nystatin (MYCOSTATIN) suspension 500,000 Units  500,000 Units Swish & Swallow 4x Daily Blake Plasencia MD   500,000 Units at 25 0801    pantoprazole (PROTONIX) EC tablet 40 mg  40 mg Oral Daily Whalen, Alona, DO   40 mg at 25 0800    polyethylene glycol (MIRALAX) Packet 17 g  17 g Oral Daily KrumMichael mckeon, DO   17 g at 25 0802    predniSONE (DELTASONE) tablet 5 mg  5 mg Oral Daily Ryan Isbell MD   5 mg at 25 0801    psyllium (METAMUCIL/KONSYL) Packet 1 packet  1 packet Oral Daily Blake Plasencia MD   1 packet at 25 0759    rosuvastatin (CRESTOR) tablet 20 mg  20 mg Oral Daily Whalen, Alona, DO   20 mg at 25 0759    sodium chloride (PF) 0.9% PF flush 3 mL  3 mL Intracatheter Q8H NE Whalen, Alona, DO   3 mL at 25 1601     Current Facility-Administered Medications   Medication Dose Route Frequency Provider Last Rate Last Admin       Physical Exam   Temp  Av.3  F (36.8  C)  Min: 97.8  F (36.6  C)  Max: 98.8  F (37.1  C)      Pulse  Av  Min: 63  Max: 80 Resp  Av.9  Min: 18  Max: 44  FiO2 (%)  Av.2 %  Min: 50 %  Max: 65 %  SpO2  Av.2 %  Min: 90 %  Max: 99 %     /77 (BP Location: Left arm)   Pulse 67   Temp 97.7  F (36.5  C) (Oral)   Resp 16   Wt 71.4 kg (157 lb 6.4 oz)   SpO2 96%   BMI 24.65 kg/m      Admit       GENERAL APPEARANCE: alert and no distress  HENT: mouth without ulcers or lesions  RESP: bilateral crackles and rhonchi.   CV: regular rhythm, normal rate  EDEMA: no LE edema bilaterally  ABDOMEN: soft, nondistended, nontender  MS: extremities normal - no gross deformities noted, no evidence of inflammation in joints, no  muscle tenderness  SKIN: no rash, numerous keratoses  TX KIDNEY: normal  DIALYSIS ACCESS: LUE AV Fistula (No thrill)    Data   All labs reviewed by me.  CMP  Recent Labs   Lab 07/19/25  0624 07/18/25  0624 07/17/25  0527 07/16/25  0533 07/15/25  1541 07/15/25  0613 07/14/25  1332    133* 132* 135  --  137 135   POTASSIUM 4.0 4.0 3.8 3.5   < > 3.9 4.0   CHLORIDE 96* 93* 93* 93*  --  96* 96*   CO2 31* 29 27 29  --  27 26   ANIONGAP 8 11 12 13  --  14 13   GLC 82 85 85 85  --  86 226*   BUN 9.0 11.8 18.0 23.2*  --  24.8* 20.9   CR 0.66* 0.72 0.64* 0.68  --  0.68 0.70   GFRESTIMATED >90 >90 >90 >90  --  >90 >90   HEMA 9.1 9.5 9.6 9.8  --  9.8 9.7   MAG 2.0 2.0 1.9 1.8   < > 1.9 1.8   PHOS 3.2 2.4* 3.0 3.6   < > 3.2 2.7   ALBUMIN  --   --  2.9* 3.2*  --  3.2* 3.2*    < > = values in this interval not displayed.     CBC  Recent Labs   Lab 07/13/25  1114   HGB 12.7*   WBC 11.6*   RBC 4.48   HCT 40.3   MCV 90   MCH 28.3   MCHC 31.5   RDW 15.2*        INR  No lab results found in last 7 days.    ABG  No lab results found in last 7 days.     Urine Studies  Recent Labs   Lab Test 07/09/25  1346 08/30/23  1856 08/20/23  1245 08/11/23  1928 06/03/20  1307   COLOR Yellow Yellow Light Yellow Light Yellow Yellow   APPEARANCE Slightly Cloudy* Clear Clear Clear Clear   URINEGLC Negative Negative Negative Negative Negative   URINEBILI Negative Negative Negative Negative Negative   URINEKETONE Trace* 10* 60* 10* Negative   SG 1.015 1.021 1.015 1.009 1.008   UBLD Negative Negative Negative Negative Negative   URINEPH 6.0 5.0 6.0 6.0 6.5   PROTEIN 20* 10* 20* 10* Negative   UROBILINOGEN  --   --   --   --  <2.0 E.U./dL   NITRITE Negative Negative Negative Negative Negative   LEUKEST Negative Negative Negative Negative Negative   RBCU 1 <1 <1 0  --    WBCU 7* 3 1 3  --      Recent Labs   Lab Test 10/28/20  0902 10/01/19  0942 04/02/19  0917 10/23/18  1122 06/21/18  1209   UTPG 0.23* 0.26* 0.23* 0.21* 0.12     PTH  No lab  results found.  Iron Studies  Recent Labs   Lab Test 10/02/23  0943   IRON 30*      IRONSAT 10*   LA 50       IMAGING:  All imaging studies reviewed by me.

## 2025-07-19 NOTE — PLAN OF CARE
Goal Outcome Evaluation:      Plan of Care Reviewed With: patient, family      Outcome Evaluation: TCU recommended by PT. Pt agreeable. Facility choices obtained and referrals placed.    ASAD Edgar  Weekend Covering   [facility] Acute Care Management  Searchable on Vocera: 7A MICHAEL DAVEY, 7B MS DAVEY

## 2025-07-19 NOTE — PROGRESS NOTES
[facility]    Progress Note - Medicine Service, MAROON TEAM 2       Date of Admission:  7/9/2025    Assessment & Plan   Jerad Ross is a 65 year old male presenting with SOB/dizzy. PMHx CABG, chronic Hep B, renal transplant (1993/1978) on chronic immunosuppression, restrictive lung disease admitted for AHRF and ?decompensated HFpEF. CT chest c/f atypical PNA versus ARDS. Also, some concern for decompensated HFpEF, treated with IV lasix 60 mg but this was complicated by orthostatic hypoTN. But improved gradually without diuresis, suggestive of AHRF driven by infectious pulmonary edema.    Changes Today:  -Tentative discharge to TCU today or tomorrow, 7/20, with home O2    #AHRF 2/2 unclear etiology, improving  #c/f acute decompensated heart failure with preserved EF, resolved  #C/f viral vs atypical pneumonia, resolved  Patient with acute hypoxemic respiratory failure and 3-day history of dyspnea and nonproductive cough, requiring BiPAP on arrival. Initial workup suggested viral or atypical pneumonia (mild leukocytosis, elevated inflammatory markers, bilateral ground-glass opacities on CT). RPP positive for rhinovirus/enterovirus--an uncommon cause of severe LRTI, though possible in immunocompromised patients. Urine antigens for Strep pneumo and Legionella were negative. Empirically treated with IV ceftriaxone and doxycycline for 5 days without improvement. Persistent hypoxia raised concern for a cardiogenic component. CXR showed worsening pulmonary edema with elevated BNP (1800); TTE on 7/14 showed preserved LVEF (55-60%) and mild RV dysfunction, consistent with HFpEF and acute pulmonary edema. Trial of IV furosemide yielded minimal improvement and led to symptomatic orthostatic hypotension, suggesting euvolemia. Given worsening CXR on 7/17, infectious alveolar edema is now favored over cardiogenic pulmonary edema though could be concurrent. He has weaned off of  HFNC to 2L NC without Lasix.     Plan:  - Will require 2L home O2 at discharge  - Planning tentative discharge today or tomorrow to TCU  - Will need pulm follow up outpatient with updated PFTs and repeat CT chest non contrast in 4-6 weeks.  - Infectious work up remarkable only for + rhino/entero virus    #Acute decompensated heart failure, resolved  #HFpEF (LVEF 55-60%)  #Hx of CAD s/p CABG  #Hx NSTEMI  History of NSTEMI (2014) with prior TTE (8/2023) showing LVEF 60-65%. Current concern for acute decompensated heart failure with preserved ejection fraction (HFpEF) given pulmonary edema on CXR and elevated BNP. IV diuresis initiated. TTE on 7/14 showed preserved LVEF (55-60%) with normal LV function, mildly reduced RV function (slightly worse than 2023), normal IVC with preserved respiratory variation, grade I diastolic dysfunction, and no significant valvular or pericardial abnormalities -- overall consistent with HFpEF. However, PT exam on 7/16 revealed orthostatic hypotension (SBP ~80s), prompting concern for a euvolemic state. Lasix held. Worsening bilateral pulmonary edema seen on 7/16 CXR, but patient clinically improved with reduced oxygen requirements (now on nasal cannula). Current impression is that pulmonary edema is more likely secondary to an infectious/inflammatory process rather than cardiogenic overload, though likely concurrent given recent ECHO.     Plan:  - Empagliflozin 10mg daily  - Dry weight determined at 157lb  - Lasix 20mg PO PRN for 5 pound weight gain above dry weight.     #COPD exacerbation, resolved  Reviewed PFTs from 4/2018 which showed moderate restriction with FVC 63%. Reports a 5 pack year smoking history. Unclear if patient has obstructive lung disease. Diagnosis of COPD appears to come from inaccurate health form filled out by patient years ago. Ideally get updated PFTs and DCLO testing. Interestingly he has had improvement with Bipap, but did not have evidence of CO2 retention on  gases suggesting less obstructive pathology.      Plan:  -Incentive spirometry   -Duonebs q4h while awake  -Will continue PTA COPD inhalers for now  -PTA Breo Ellipta 1 puff/day  -PTA albuterol PRN     #Hx of renal transplant   #chronic immunosuppression  Hx of renal failure on dialysis at age 16, and kidney transplant, infected with hepatitis B from dialysis. Stable kidney function this admission.    Plan:  -Need updated recommendations from transplant nephrology regarding MMF dosing on discharge  -Reduced PTA Mycophenolate 500mg BID (decreased dose for 5 days starting 7/10-7/14*, per nephrology)  -PTA Prednisone 5mg daily      #Oral Thrush, improving  Pt noticed oral thrush and observed on buccal mucosa and tongue.  Ordered Nystatin.    #Lactic acidosis, resolved    Likely secondary to severe infection in setting of AHRF. LA on admission 3.3 --> 2.5 --> 1.4.      #Elevated troponins, resolved  #Type 2 demand ischemia   Likely secondary to demand ischemia in setting of severe infection and respiratory failure. Without chest pain or signs of fluid overload on exam. EKG without acute ischemic changes. POC ECHO without pericardial effusion and normal appearing ejection fraction. 7/14 ECHO with normal global and regional left ventricular function with an LVEF of 55-60%.      Chronic Medical Problems:    #HTN, controlled  Plan:  PTA Metoprolol succinate 12.5mg daily  PTA Imdur 60 mg daily    #HLD  Plan:  -rosuvastatin 20mg daily    #Chronic hepatitis B  Plan:  -entecavir 0.5mg daily     #GERD  Plan:  -Pantoprazole 40mg daily     #KIM  Plan:  PTA Atarax 10-25mg PRN  PTA Fluoxetine 60mg daily        Diet: Low Saturated Fat Na <2400 mg    DVT Prophylaxis: Enoxaparin (Lovenox) SQ  [place] Catheter: Not present  Fluids: none  Lines: None     Cardiac Monitoring: None  Code Status: No CPR- Do NOT Intubate      Clinically Significant Risk Factors         # Hyponatremia: Lowest Na = 133 mmol/L in last 2 days, will monitor as  appropriate  # Hypochloremia: Lowest Cl = 93 mmol/L in last 2 days, will monitor as appropriate      # Hypoalbuminemia: Lowest albumin = 2.9 g/dL at 7/17/2025  5:27 AM, will monitor as appropriate     # Hypertension: Noted on problem list                       Social Drivers of Health   Tobacco Use: Medium Risk (6/30/2025)    Patient History     Smoking Tobacco Use: Former     Smokeless Tobacco Use: Former     Passive Exposure: Never   Physical Activity: Insufficiently Active (11/5/2024)    Exercise Vital Sign     Days of Exercise per Week: 5 days     Minutes of Exercise per Session: 20 min   Stress: Stress Concern Present (11/5/2024)    [employer] - Occupational Stress Questionnaire     Feeling of Stress : To some extent   Social Connections: Unknown (11/5/2024)    Social Connection and Isolation Panel [NHANES]     Frequency of Social Gatherings with Friends and Family: More than three times a week         Disposition Plan     Medically Ready for Discharge: Anticipated Tomorrow     The patient's care was discussed with the Attending Physician, Dr. Jaime.    Alona Whalen,   Medicine Service, [address]  Securely message with [employer] (more info)  Text page via [facility] Paging/Directory   See signed in provider for up to date coverage information  ______________________________________________________________________    Interval History   No acute events overnight. Jerda is feeling tired this morning. Intermittently closing his eyes on interview, but opening eyes when talked to. He feels like his breathing is better, still has a persistent cough. He is found on 2L NC, saturating at 86%. He is agreeable to discharge to TCU pending acceptance, which may take some time. No new concerns at this time.    Physical Exam   Vital Signs: Temp: 98.6  F (37  C) Temp src: Oral BP: 116/70 Pulse: 73   Resp: 16 SpO2: 95 % O2 Device: None (Room  air) Oxygen Delivery: 2.5 LPM  Weight: 157 lbs 6.4 oz    .Physical Exam  Vitals reviewed.   Constitutional:       General: He is not in acute distress.  HENT:      Head: Normocephalic and atraumatic.   Cardiovascular:      Rate and Rhythm: Normal rate and regular rhythm.   Pulmonary:      Effort: Pulmonary effort is normal.      Breath sounds: Examination of the right-lower field reveals wheezing. Examination of the left-lower field reveals wheezing. Wheezing present.      Comments: Found on 2L NC. Bilateral fine crackles heard in lower lobes.   Abdominal:      Palpations: Abdomen is soft.      Tenderness: There is no abdominal tenderness.   Musculoskeletal:      Right lower leg: No edema.      Left lower leg: No edema.       Medical Decision Making       Please see A&P for additional details of medical decision making.      Data     I have personally reviewed the following data over the past 24 hrs:    N/A  \   N/A   / N/A     135 96 (L) 9.0 /  82   4.0 31 (H) 0.66 (L) \       Imaging results reviewed over the past 24 hrs:   No results found for this or any previous visit (from the past 24 hours).

## 2025-07-19 NOTE — PLAN OF CARE
Goal Outcome Evaluation:    Plan of Care Reviewed With: patient  Overall Patient Progress: no change  Outcome Evaluation: VSS on 2L stating well.    Shift: 9966-8142  /77 (BP Location: Left arm)   Pulse 67   Temp 97.7  F (36.5  C) (Oral)   Resp 16   Wt 71.4 kg (157 lb 6.4 oz)   SpO2 96%   BMI 24.65 kg/m       VS- stable on 1-2L NC. Afebrile  BG- none   Labs- pending AM collection   Pain/Nausea/PRN'S- denies nausea. PRN's tylenol, Atarax, Robitussin and Melatonin given   Diet- Low sodium Low Fat no caffeine   LDA- PIV X1  Gtt/IVF- none   GI/- voiding adequately, LBM yesterday   Skin- intact   Activity- SBA  Plan-  continue with POC.

## 2025-07-20 PROCEDURE — 250N000009 HC RX 250

## 2025-07-20 PROCEDURE — 99233 SBSQ HOSP IP/OBS HIGH 50: CPT | Performed by: INTERNAL MEDICINE

## 2025-07-20 PROCEDURE — 250N000013 HC RX MED GY IP 250 OP 250 PS 637: Performed by: STUDENT IN AN ORGANIZED HEALTH CARE EDUCATION/TRAINING PROGRAM

## 2025-07-20 PROCEDURE — 94640 AIRWAY INHALATION TREATMENT: CPT

## 2025-07-20 PROCEDURE — 250N000012 HC RX MED GY IP 250 OP 636 PS 637

## 2025-07-20 PROCEDURE — 120N000005 HC R&B MS OVERFLOW UMMC

## 2025-07-20 PROCEDURE — 250N000012 HC RX MED GY IP 250 OP 636 PS 637: Performed by: NURSE PRACTITIONER

## 2025-07-20 PROCEDURE — 999N000156 HC STATISTIC RCP CONSULT EA 30 MIN

## 2025-07-20 PROCEDURE — 99232 SBSQ HOSP IP/OBS MODERATE 35: CPT | Mod: GC | Performed by: STUDENT IN AN ORGANIZED HEALTH CARE EDUCATION/TRAINING PROGRAM

## 2025-07-20 PROCEDURE — 250N000013 HC RX MED GY IP 250 OP 250 PS 637

## 2025-07-20 PROCEDURE — 250N000011 HC RX IP 250 OP 636

## 2025-07-20 RX ORDER — PETROLATUM,WHITE
OINTMENT IN PACKET (GRAM) TOPICAL DAILY PRN
Status: DISCONTINUED | OUTPATIENT
Start: 2025-07-20 | End: 2025-07-21 | Stop reason: HOSPADM

## 2025-07-20 RX ADMIN — Medication 5 MG: at 23:12

## 2025-07-20 RX ADMIN — EMPAGLIFLOZIN 10 MG: 10 TABLET, FILM COATED ORAL at 07:59

## 2025-07-20 RX ADMIN — NYSTATIN 500000 UNITS: 100000 SUSPENSION ORAL at 15:47

## 2025-07-20 RX ADMIN — NYSTATIN 500000 UNITS: 100000 SUSPENSION ORAL at 11:26

## 2025-07-20 RX ADMIN — Medication 12.5 MG: at 20:01

## 2025-07-20 RX ADMIN — MYCOPHENOLATE MOFETIL 500 MG: 250 CAPSULE ORAL at 08:00

## 2025-07-20 RX ADMIN — ISOSORBIDE MONONITRATE 60 MG: 60 TABLET, EXTENDED RELEASE ORAL at 07:59

## 2025-07-20 RX ADMIN — MYCOPHENOLATE MOFETIL 500 MG: 250 CAPSULE ORAL at 18:15

## 2025-07-20 RX ADMIN — Medication 1 TABLET: at 11:26

## 2025-07-20 RX ADMIN — ASPIRIN 81 MG CHEWABLE TABLET 81 MG: 81 TABLET CHEWABLE at 07:59

## 2025-07-20 RX ADMIN — PREDNISONE 5 MG: 5 TABLET ORAL at 08:00

## 2025-07-20 RX ADMIN — ENOXAPARIN SODIUM 40 MG: 40 INJECTION SUBCUTANEOUS at 15:47

## 2025-07-20 RX ADMIN — ENTECAVIR 0.5 MG: 0.5 TABLET ORAL at 07:59

## 2025-07-20 RX ADMIN — IPRATROPIUM BROMIDE AND ALBUTEROL SULFATE 3 ML: .5; 3 SOLUTION RESPIRATORY (INHALATION) at 08:34

## 2025-07-20 RX ADMIN — FLUOXETINE HYDROCHLORIDE 60 MG: 40 CAPSULE ORAL at 07:59

## 2025-07-20 RX ADMIN — ACETAMINOPHEN 650 MG: 325 TABLET ORAL at 18:15

## 2025-07-20 RX ADMIN — FLUTICASONE FUROATE AND VILANTEROL TRIFENATATE 1 PUFF: 100; 25 POWDER RESPIRATORY (INHALATION) at 08:00

## 2025-07-20 RX ADMIN — GUAIFENESIN 200 MG: 200 SOLUTION ORAL at 20:50

## 2025-07-20 RX ADMIN — POLYETHYLENE GLYCOL 3350 17 G: 17 POWDER, FOR SOLUTION ORAL at 07:57

## 2025-07-20 RX ADMIN — PSYLLIUM HUSK 1 PACKET: 3.4 POWDER ORAL at 07:59

## 2025-07-20 RX ADMIN — PANTOPRAZOLE SODIUM 40 MG: 40 TABLET, DELAYED RELEASE ORAL at 08:00

## 2025-07-20 RX ADMIN — NYSTATIN 500000 UNITS: 100000 SUSPENSION ORAL at 08:59

## 2025-07-20 RX ADMIN — GUAIFENESIN 200 MG: 200 SOLUTION ORAL at 08:59

## 2025-07-20 RX ADMIN — ROSUVASTATIN CALCIUM 20 MG: 20 TABLET, FILM COATED ORAL at 07:59

## 2025-07-20 RX ADMIN — GUAIFENESIN 200 MG: 200 SOLUTION ORAL at 15:52

## 2025-07-20 RX ADMIN — NALTREXONE HYDROCHLORIDE 50 MG: 50 TABLET, FILM COATED ORAL at 08:00

## 2025-07-20 RX ADMIN — NYSTATIN 500000 UNITS: 100000 SUSPENSION ORAL at 20:01

## 2025-07-20 ASSESSMENT — ACTIVITIES OF DAILY LIVING (ADL)
ADLS_ACUITY_SCORE: 49

## 2025-07-20 NOTE — PROGRESS NOTES
[facility]  Transplant Nephrology Progress Note  Date of Admission:  7/9/2025  Today's Date: 07/20/2025    Recommendations:   - continue to hold diuretics given stable to improving pulm symptoms  - Daily weights and blood pressure.   - No changes to IS.    Assessment & Plan   # DDKT: Stable creatinine, at baseline. Good urine output.     - Baseline Creatinine: ~ 0.7-0.9   - Proteinuria: Normal (<0.2 grams)   - DSA Hx: Not checked recently due to time from transplant   - Last cPRA: unknown%   - BK Viremia: Not checked recently due to time from transplant   - Kidney Tx Biopsy Hx: No biopsy history.    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     # Immunosuppression Prior to Admission: Mycophenolate mofetil (dose 750 mg every 12 hours) and Prednisone (dose 5 mg daily)   - Present Immunosuppression: Mycophenolate mofetil (dose 500 mg every 12 hours) and Prednisone (dose 5 mg daily)    - Induction with Recent Transplant:  Not known due to time from transplant   - Continue with intensive monitoring of immunosuppression for efficacy and toxicity.   - Historical Changes in Immunosuppression: None   - Changes: Not at this time    # Infection Prevention:   Last CD4 Level: Not checked recently  - PJP: None      - CMV IgG Ab High Risk Discordance (D+/R-) at time of transplant: Unknown  Present CMV Serostatus: Unknown  - EBV IgG Ab High Risk Discordance (D+/R-) at time of transplant: Unknown  Present EBV Serostatus: Unknown    # Hypertension: Controlled;  Goal BP: < 140/90 (Hospitalization goal)   - Changes: Not at this time    # Anemia in Chronic Renal Disease: Hgb: Stable      CAROL ANN: No   - Iron studies: Not checked recently    # Mineral Bone Disorder:    - Calcium; level: Normal        On supplement: Yes; calcium citrate 315 mg daily.  - Phosphorus; level: Normal        On supplement: No    # Electrolytes:  - Potassium; level: Normal        On supplement: No  -  Magnesium; level: Normal        On supplement: No  - Bicarbonate; level: Normal        On supplement: No  - Sodium; level: Normal    # Acute hypoxic respiratory failure:  # Pulmonary edema:  # Viral versus Atypical Pneumonia: + Rhino/enterovirus. CT PE negative. Gram + Bcx on 07/09, likely contaminant. Repeat blood cultures 07/10. Sputum culture 7/12 likely contaminated, per report. Repeat pending. CXR 7/13 with pulmonary edema. Started on ceftriaxone and currently on prednisone 40 mg daily and doxycycline.    - CXR 7/16 with pulmonary edema and bilateral pleural effusions.   - Management per primary team. Pulmonology following.    # Acute decompensated heart failure: echo in 2023 with no evidence of HF. BNP 1800 on 7/13. PTA on 20 mg lasix PO daily.    - Echo 7/14; EF 55-65% with grade I diastolic dysfunction. No pericardial effusion.   - Management per primary team.     # Other Significant PMH:   - CAD, s/p CABG: Last cardiac stress test was abnormal in 6/2023 (but unchanged from prior testing in 2022).  Coronary angiogram 9/2021 that showed some possible obstructive disease in the 1st diagonal, but unable to stent it.  Bypass grafts were open.  Plan is for medical management. Last cardiac echo (8/2023) showed LVEF ~ 60-65%. Normal right ventricular systolic function. Normal diastolic dysfunction.    - Provoked PE (8/2023): after right hip revision surgery. Completed AC.   - Chronic Hepatitis B: Stable on entecavir. Follows here with hepatology.   - Asthma: Some increase in shortness of breath, but felt more cardiac in origin.  Patient is stable on inhalers.   - GERD: Asymptomatic on pantoprazole, but with h/o ulcer, will continue on medication.   - Skin Cancer: SCCIS, right lateral chest, s/p bx 5/9/24, s/p MMS on 8/5/24     # Transplant History:  Etiology of Kidney Failure: Focal segmental glomerulosclerosis (FSGS)  Tx: DDKT  Transplant: 10/6/1993 (Kidney), 4/18/1978 (Kidney)  Significant transplant-related  complications: Recurrent Skin Cancers    Recommendations were communicated to the primary team via Vocera.    Harriet Hyde MD  Transplant Nephrology    Interval History  No events overnight.  Decreasing supplemental oxygen requirements. Still with cough but improved breathing status. He doesn't report leg swelling or orthopnea. Stable blood pressure without lightheadedness.     Review of Systems   4 point ROS was obtained and negative except as noted in the Interval History.    MEDICATIONS:  Current Facility-Administered Medications   Medication Dose Route Frequency Provider Last Rate Last Admin    aspirin (ASA) chewable tablet 81 mg  81 mg Oral Daily Whalen, Alona, DO   81 mg at 07/20/25 0759    calcium citrate-vitamin D (CITRACAL) 315-6.25 MG-MCG per tablet 1 tablet  1 tablet Oral Daily with lunch Blake Plasencia MD   1 tablet at 07/20/25 1126    empagliflozin (JARDIANCE) tablet 10 mg  10 mg Oral Daily Whalen, Alona, DO   10 mg at 07/20/25 0759    enoxaparin ANTICOAGULANT (LOVENOX) injection 40 mg  40 mg Subcutaneous Q24H Whalen, Alona, DO   40 mg at 07/19/25 1647    entecavir (BARACLUDE) tablet 0.5 mg  0.5 mg Oral Daily Whalen, Alona, DO   0.5 mg at 07/20/25 0759    FLUoxetine (PROzac) capsule 60 mg  60 mg Oral Daily Blake Plasencia MD   60 mg at 07/20/25 0759    fluticasone-vilanterol (BREO ELLIPTA) 100-25 MCG/ACT inhaler 1 puff  1 puff Inhalation Daily Whalen, Alona, DO   1 puff at 07/20/25 0800    isosorbide mononitrate (IMDUR) 24 hr tablet 60 mg  60 mg Oral Daily Whalen, Alona, DO   60 mg at 07/20/25 0759    metoprolol succinate ER (TOPROL-XL) 24 hr half-tab 12.5 mg  12.5 mg Oral Daily Whalen, Alona, DO   12.5 mg at 07/19/25 2127    multivitamin w/minerals (THERA-VIT-M) tablet 1 tablet  1 tablet Oral Daily with lunch Blake Plasencia MD   1 tablet at 07/20/25 1126    mycophenolate (GENERIC EQUIVALENT) capsule 500 mg  500 mg Oral BID IS Lisa Levine APRN CNP   500 mg at 07/20/25 0800    naltrexone (DEPADE/REVIA) tablet  50 mg  50 mg Oral Daily Whalen, Alona, DO   50 mg at 25 0800    nystatin (MYCOSTATIN) suspension 500,000 Units  500,000 Units Swish & Swallow 4x Daily Blake Plasencia MD   500,000 Units at 25 1126    pantoprazole (PROTONIX) EC tablet 40 mg  40 mg Oral Daily Whalen, Alona, DO   40 mg at 25 0800    polyethylene glycol (MIRALAX) Packet 17 g  17 g Oral Daily Michael Mas, DO   17 g at 25 0757    predniSONE (DELTASONE) tablet 5 mg  5 mg Oral Daily Ryan Isbell MD   5 mg at 25 0800    psyllium (METAMUCIL/KONSYL) Packet 1 packet  1 packet Oral Daily Blake Plasencia MD   1 packet at 25 0759    rosuvastatin (CRESTOR) tablet 20 mg  20 mg Oral Daily Whalen, Alona, DO   20 mg at 25 0759    sodium chloride (PF) 0.9% PF flush 3 mL  3 mL Intracatheter Q8H NE Whalen, Alona, DO   3 mL at 25 0859     Current Facility-Administered Medications   Medication Dose Route Frequency Provider Last Rate Last Admin    Patient may continue current oral medications   Does not apply Continuous PRN Heriberto Darling MD           Physical Exam   Temp  Av.3  F (36.8  C)  Min: 97.8  F (36.6  C)  Max: 98.8  F (37.1  C)      Pulse  Av  Min: 63  Max: 80 Resp  Av.9  Min: 18  Max: 44  FiO2 (%)  Av.2 %  Min: 50 %  Max: 65 %  SpO2  Av.2 %  Min: 90 %  Max: 99 %     /85   Pulse 61   Temp 98.6  F (37  C) (Oral)   Resp 18   Wt 71.1 kg (156 lb 12.8 oz)   SpO2 100%   BMI 24.56 kg/m      Admit       GENERAL APPEARANCE: alert and no distress  HENT: mouth without ulcers or lesions  RESP: bilateral crackles and rhonchi, but improved from prior.  CV: regular rhythm, normal rate  EDEMA: no LE edema bilaterally  ABDOMEN: soft, nondistended, nontender  MS: extremities normal - no gross deformities noted, no evidence of inflammation in joints, no muscle tenderness  SKIN: no rash, numerous keratoses  TX KIDNEY: normal  DIALYSIS ACCESS: LUE AV Fistula (No thrill)    Data   All labs reviewed by  me.  CMP  Recent Labs   Lab 07/19/25  0624 07/18/25  0624 07/17/25  0527 07/16/25  0533 07/15/25  1541 07/15/25  0613 07/14/25  1332    133* 132* 135  --  137 135   POTASSIUM 4.0 4.0 3.8 3.5   < > 3.9 4.0   CHLORIDE 96* 93* 93* 93*  --  96* 96*   CO2 31* 29 27 29  --  27 26   ANIONGAP 8 11 12 13  --  14 13   GLC 82 85 85 85  --  86 226*   BUN 9.0 11.8 18.0 23.2*  --  24.8* 20.9   CR 0.66* 0.72 0.64* 0.68  --  0.68 0.70   GFRESTIMATED >90 >90 >90 >90  --  >90 >90   HEMA 9.1 9.5 9.6 9.8  --  9.8 9.7   MAG 2.0 2.0 1.9 1.8   < > 1.9 1.8   PHOS 3.2 2.4* 3.0 3.6   < > 3.2 2.7   ALBUMIN  --   --  2.9* 3.2*  --  3.2* 3.2*    < > = values in this interval not displayed.     CBC  No lab results found in last 7 days.    INR  No lab results found in last 7 days.    ABG  Recent Labs   Lab 07/19/25  0946   O2PER 2        Urine Studies  Recent Labs   Lab Test 07/09/25  1346 08/30/23  1856 08/20/23  1245 08/11/23  1928 06/03/20  1307   COLOR Yellow Yellow Light Yellow Light Yellow Yellow   APPEARANCE Slightly Cloudy* Clear Clear Clear Clear   URINEGLC Negative Negative Negative Negative Negative   URINEBILI Negative Negative Negative Negative Negative   URINEKETONE Trace* 10* 60* 10* Negative   SG 1.015 1.021 1.015 1.009 1.008   UBLD Negative Negative Negative Negative Negative   URINEPH 6.0 5.0 6.0 6.0 6.5   PROTEIN 20* 10* 20* 10* Negative   UROBILINOGEN  --   --   --   --  <2.0 E.U./dL   NITRITE Negative Negative Negative Negative Negative   LEUKEST Negative Negative Negative Negative Negative   RBCU 1 <1 <1 0  --    WBCU 7* 3 1 3  --      Recent Labs   Lab Test 10/28/20  0902 10/01/19  0942 04/02/19  0917 10/23/18  1122 06/21/18  1209   UTPG 0.23* 0.26* 0.23* 0.21* 0.12     PTH  No lab results found.  Iron Studies  Recent Labs   Lab Test 10/02/23  0943   IRON 30*      IRONSAT 10*   LA 50       IMAGING:  All imaging studies reviewed by me.

## 2025-07-20 NOTE — PLAN OF CARE
Goal Outcome Evaluation:      Plan of Care Reviewed With: patient    Overall Patient Progress: improvingOverall Patient Progress: improving    Outcome Evaluation: Ready to discharge, waiting on TCU bed. BM, voiding. Productive cough.    BP 99/67   Pulse 66   Temp 98.6  F (37  C) (Oral)   Resp 16   Wt 71.1 kg (156 lb 12.8 oz)   SpO2 93%   BMI 24.56 kg/m      Shift: 8787-7057  Isolation Status: Droplet  VS: hypotensive, but otherwise WDL on 2 LPM via NC, afebrile  Neuro: Aox4  Behaviors: pleasant, cooperative with cares, able to make his needs known  BG: NA  Labs/Imaging: No labs drawn today  Respiratory: Clear, diminished lung sounds. Productive cough.  Cardiac: Regular rhythm/rate  Pain/Nausea: Denied pain and nausea  PRN: Guaifenesin x2, Tylenol for back/shoulder ache x1  Diet: Low fat, Low Na, fair appetite  LDA: PIV  Infusion(s): NA  GI/: 320 mL yellow urine output in addition to one unmeasured occurrence. Large formed BM  Skin: Many scattered melanoma lesions, dusky extremities  Mobility: Assist of 1 with walker and gait belt. One short walk outside room  Plan: Discharge to TCU when bed available.

## 2025-07-20 NOTE — PROGRESS NOTES
[facility]    Progress Note - Medicine Service, MAROON TEAM 2       Date of Admission:  7/9/2025    Assessment & Plan   Jerad Ross is a 65 year old male presenting with SOB/dizzy. PMHx CABG, chronic Hep B, renal transplant (1993/1978) on chronic immunosuppression, restrictive lung disease admitted for AHRF and ?decompensated HFpEF. CT chest c/f atypical PNA versus ARDS. Also, some concern for decompensated HFpEF, treated with IV lasix 60 mg but this was complicated by orthostatic hypoTN. But improved gradually without diuresis, suggestive of AHRF driven by infectious pulmonary edema.    Changes Today:  - Comfortable on room air - 1-2 L NC  - Awaiting TCU placement    #AHRF 2/2 unclear etiology, resolved  #c/f acute decompensated heart failure with preserved EF, resolved  #C/f viral vs atypical pneumonia, resolved  Patient with acute hypoxemic respiratory failure and 3-day history of dyspnea and nonproductive cough, requiring BiPAP on arrival. Initial workup suggested viral or atypical pneumonia (mild leukocytosis, elevated inflammatory markers, bilateral ground-glass opacities on CT). RPP positive for rhinovirus/enterovirus--an uncommon cause of severe LRTI, though possible in immunocompromised patients. Empirically treated with IV ceftriaxone and doxycycline for 5 days without improvement. Persistent hypoxia raised concern for a cardiogenic component. CXR showed worsening pulmonary edema with elevated BNP (1800); TTE on 7/14 showed preserved LVEF (55-60%) and mild RV dysfunction, consistent with HFpEF and acute pulmonary edema. Trial of IV furosemide yielded minimal improvement and led to symptomatic orthostatic hypotension, suggesting euvolemia.  Fortunately, oxygenation mostly improved without any major further interventions, suggesting perhaps this was viral pneumonia from the very beginning.  - Will require 2 L home oxygen and oximeter at discharge  - Needs  outpatient pulmonology follow-up with updated PFTs and repeat noncon CT chest in 4-6 weeks    #Acute decompensated heart failure, resolved  #HFpEF (LVEF 55-60%)  #Hx of CAD s/p CABG  #Hx NSTEMI  History of NSTEMI (2014) with prior TTE (8/2023) showing LVEF 60-65%. Current concern for acute decompensated heart failure with preserved ejection fraction (HFpEF) given pulmonary edema on CXR and elevated BNP. IV diuresis initiated. TTE on 7/14 showed preserved LVEF (55-60%) with normal LV function, mildly reduced RV function (slightly worse than 2023), normal IVC with preserved respiratory variation, grade I diastolic dysfunction, and no significant valvular or pericardial abnormalities -- overall consistent with HFpEF. However, PT exam on 7/16 revealed orthostatic hypotension (SBP ~80s), prompting concern for a euvolemic state. Lasix held. Worsening bilateral pulmonary edema seen on 7/16 CXR, but patient clinically improved with reduced oxygen requirements (now on nasal cannula). Current impression is that pulmonary edema is more likely secondary to an infectious/inflammatory process rather than cardiogenic overload, though likely concurrent given recent ECHO.   - Initiated empagliflozin 10mg daily while inpatient, discussing the UTI risk, and patient verbalized understanding  - Dry weight determined at 157lb  - Lasix 20mg PO PRN for 5 pound weight gain above dry weight, per nephrology     #COPD exacerbation, resolved  Reviewed PFTs from 4/2018 which showed moderate restriction with FVC 63%. Reports a 5 pack year smoking history. Unclear if patient has obstructive lung disease. Diagnosis of COPD appears to come from inaccurate health form filled out by patient years ago. Ideally get updated PFTs and DCLO testing. Interestingly he has had improvement with Bipap, but did not have evidence of CO2 retention on gases suggesting less obstructive pathology.   - DuoNebs scheduled  - Continue PTA inhalers      Chronic Medical  Problems:     #Hx of renal transplant   #chronic immunosuppression  Hx of renal failure on dialysis at age 16, and kidney transplant, infected with hepatitis B from dialysis. Stable kidney function this admission.  -Need updated recommendations from transplant nephrology regarding MMF dosing on discharge  -Reduced PTA Mycophenolate 500mg BID (decreased dose for 5 days starting 7/10-7/14*, per nephrology)  -PTA Prednisone 5mg daily     #Oral Thrush, improving  Pt noticed oral thrush and observed on buccal mucosa and tongue.  - Nystatin    #Lactic acidosis, resolved    Likely secondary to severe infection in setting of AHRF. LA on admission 3.3 --> 2.5 --> 1.4.      #Elevated troponins, resolved  #Type 2 demand ischemia   Likely secondary to demand ischemia in setting of severe infection and respiratory failure. Without chest pain or signs of fluid overload on exam. EKG without acute ischemic changes. POC ECHO without pericardial effusion and normal appearing ejection fraction. 7/14 ECHO with normal global and regional left ventricular function with an LVEF of 55-60%.    #HTN, controlled  - Continue PTA Metoprolol succinate 12.5mg daily  - Continue PTA Imdur 60 mg daily    #HLD  -  ContinuePTA rosuvastatin 20mg daily    #Chronic hepatitis B  - Continue PTA entecavir 0.5mg daily     #GERD  - Continue PTA Pantoprazole 40mg daily     #KIM  - Continue PTA Atarax 10-25mg PRN  - Continue PTA Fluoxetine 60mg daily        Diet: Low Saturated Fat Na <2400 mg    DVT Prophylaxis: Enoxaparin (Lovenox) SQ  [place] Catheter: Not present  Fluids: none  Lines: None     Cardiac Monitoring: None  Code Status: No CPR- Do NOT Intubate      Clinically Significant Risk Factors          # Hypochloremia: Lowest Cl = 96 mmol/L in last 2 days, will monitor as appropriate      # Hypoalbuminemia: Lowest albumin = 2.9 g/dL at 7/17/2025  5:27 AM, will monitor as appropriate     # Hypertension: Noted on problem list                       Social  Drivers of Health   Tobacco Use: Medium Risk (6/30/2025)    Patient History     Smoking Tobacco Use: Former     Smokeless Tobacco Use: Former     Passive Exposure: Never   Physical Activity: Insufficiently Active (11/5/2024)    Exercise Vital Sign     Days of Exercise per Week: 5 days     Minutes of Exercise per Session: 20 min   Stress: Stress Concern Present (11/5/2024)    [employer] - Occupational Stress Questionnaire     Feeling of Stress : To some extent   Social Connections: Unknown (11/5/2024)    Social Connection and Isolation Panel [NHANES]     Frequency of Social Gatherings with Friends and Family: More than three times a week         Disposition Plan     Medically Ready for Discharge: Anticipated Tomorrow     The patient's care was discussed with the Attending Physician, Dr. Jaime.    Heriberto Darling MD  Medicine Service, [address]  Securely message with Vocera (more info)  Text page via [facility] Paging/Directory   See signed in provider for up to date coverage information  ______________________________________________________________________    Interval History   No acute events overnight.  Patient states he is ready for discharge this morning.  Discussed the potential side effects of SGLT2 inhibitor, patient verbalized understanding.    Physical Exam   Vital Signs: Temp: 98.6  F (37  C) Temp src: Oral BP: 120/85 Pulse: 61   Resp: 18 SpO2: 100 % O2 Device: Nasal cannula Oxygen Delivery: 2 LPM  Weight: 156 lbs 12.8 oz    .Physical Exam  Vitals reviewed.   Constitutional:       General: He is not in acute distress.  HENT:      Head: Normocephalic and atraumatic.   Cardiovascular:      Rate and Rhythm: Normal rate and regular rhythm.   Pulmonary:      Effort: Pulmonary effort is normal.      Breath sounds: Examination of the right-lower field reveals wheezing. Examination of the left-lower field reveals wheezing.  Wheezing present.      Comments: Found on 2L NC. Bilateral fine crackles heard in lower lobes.   Abdominal:      Palpations: Abdomen is soft.      Tenderness: There is no abdominal tenderness.   Musculoskeletal:      Right lower leg: No edema.      Left lower leg: No edema.       Medical Decision Making       Please see A&P for additional details of medical decision making.      Data         Imaging results reviewed over the past 24 hrs:   No results found for this or any previous visit (from the past 24 hours).

## 2025-07-20 NOTE — PLAN OF CARE
Goal Outcome Evaluation:    Plan of Care Reviewed With: patient  Overall Patient Progress: improving  Outcome Evaluation: VSS on 2L NC, no cpap worn overnight ,pain manged with PRN's. voiding and saving.SBA. possible D/C to TCU today.    Shift: 4459-4492  /85   Pulse 61   Temp 98.6  F (37  C) (Oral)   Resp 18   Wt 71.1 kg (156 lb 12.8 oz)   SpO2 100%   BMI 24.56 kg/m       VS- stable  on 2L NC, declined CPAP overnight. Afebrile  Resp- scheduled nebs, dry nonproductive cough. Encouraging IS.  BG- none  Labs-  pending AM collection   Pain/Nausea/PRN'S- denies nausea. PRN Robitussin Tylenol and Melatonin given  Diet- low sodium, Low fat  LDA- PIV X1 SL  Gtt/IVF- none   GI/- voiding adequelty, LBM 7/18 per patient  Skin- scattered brusing, otherwise intact   Activity- SBA, up in chair in evening, Encouraging walking   Plan-  possible Discharge to TCU pending bed, continue with POC.

## 2025-07-20 NOTE — PROGRESS NOTES
Patient not wearing bipap anymore at night and does not wear one at home. Pulling from room. RT to continue to follow

## 2025-07-21 ENCOUNTER — HOSPITAL ENCOUNTER (INPATIENT)
Facility: SKILLED NURSING FACILITY | Age: 65
DRG: 948 | End: 2025-07-21
Attending: INTERNAL MEDICINE | Admitting: INTERNAL MEDICINE
Payer: COMMERCIAL

## 2025-07-21 VITALS
RESPIRATION RATE: 16 BRPM | HEART RATE: 61 BPM | BODY MASS INDEX: 23.93 KG/M2 | SYSTOLIC BLOOD PRESSURE: 108 MMHG | DIASTOLIC BLOOD PRESSURE: 76 MMHG | OXYGEN SATURATION: 99 % | TEMPERATURE: 98.5 F | WEIGHT: 152.8 LBS

## 2025-07-21 DIAGNOSIS — I50.33 ACUTE ON CHRONIC HEART FAILURE WITH PRESERVED EJECTION FRACTION (H): Primary | ICD-10-CM

## 2025-07-21 PROBLEM — I50.30 (HFPEF) HEART FAILURE WITH PRESERVED EJECTION FRACTION (H): Status: ACTIVE | Noted: 2025-07-21

## 2025-07-21 PROBLEM — J06.9 VIRAL URI: Status: ACTIVE | Noted: 2025-07-21

## 2025-07-21 PROBLEM — J96.01 ACUTE HYPOXIC RESPIRATORY FAILURE (H): Status: ACTIVE | Noted: 2025-07-21

## 2025-07-21 PROBLEM — Z94.0 RENAL TRANSPLANT, STATUS POST: Status: ACTIVE | Noted: 2025-07-21

## 2025-07-21 LAB
CREAT SERPL-MCNC: 0.66 MG/DL (ref 0.67–1.17)
EGFRCR SERPLBLD CKD-EPI 2021: >90 ML/MIN/1.73M2
GLUCOSE BLDC GLUCOMTR-MCNC: 84 MG/DL (ref 70–99)

## 2025-07-21 PROCEDURE — 250N000012 HC RX MED GY IP 250 OP 636 PS 637

## 2025-07-21 PROCEDURE — 250N000013 HC RX MED GY IP 250 OP 250 PS 637: Performed by: INTERNAL MEDICINE

## 2025-07-21 PROCEDURE — 82565 ASSAY OF CREATININE: CPT | Performed by: INTERNAL MEDICINE

## 2025-07-21 PROCEDURE — 250N000013 HC RX MED GY IP 250 OP 250 PS 637: Performed by: STUDENT IN AN ORGANIZED HEALTH CARE EDUCATION/TRAINING PROGRAM

## 2025-07-21 PROCEDURE — 99233 SBSQ HOSP IP/OBS HIGH 50: CPT | Performed by: STUDENT IN AN ORGANIZED HEALTH CARE EDUCATION/TRAINING PROGRAM

## 2025-07-21 PROCEDURE — 99239 HOSP IP/OBS DSCHRG MGMT >30: CPT | Mod: GC | Performed by: STUDENT IN AN ORGANIZED HEALTH CARE EDUCATION/TRAINING PROGRAM

## 2025-07-21 PROCEDURE — 250N000012 HC RX MED GY IP 250 OP 636 PS 637: Performed by: INTERNAL MEDICINE

## 2025-07-21 PROCEDURE — 250N000013 HC RX MED GY IP 250 OP 250 PS 637

## 2025-07-21 PROCEDURE — 250N000012 HC RX MED GY IP 250 OP 636 PS 637: Performed by: NURSE PRACTITIONER

## 2025-07-21 PROCEDURE — 120N000009 HC R&B SNF

## 2025-07-21 PROCEDURE — 250N000011 HC RX IP 250 OP 636

## 2025-07-21 PROCEDURE — 36415 COLL VENOUS BLD VENIPUNCTURE: CPT | Performed by: INTERNAL MEDICINE

## 2025-07-21 RX ORDER — LATANOPROST 50 UG/ML
1 SOLUTION/ DROPS OPHTHALMIC AT BEDTIME
Status: DISCONTINUED | OUTPATIENT
Start: 2025-07-21 | End: 2025-07-30 | Stop reason: HOSPADM

## 2025-07-21 RX ORDER — ACETAMINOPHEN 325 MG/1
650 TABLET ORAL EVERY 4 HOURS PRN
Status: DISCONTINUED | OUTPATIENT
Start: 2025-07-21 | End: 2025-07-30 | Stop reason: HOSPADM

## 2025-07-21 RX ORDER — ASPIRIN 81 MG/1
81 TABLET ORAL DAILY
Status: DISCONTINUED | OUTPATIENT
Start: 2025-07-22 | End: 2025-07-30 | Stop reason: HOSPADM

## 2025-07-21 RX ORDER — PETROLATUM,WHITE
OINTMENT IN PACKET (GRAM) TOPICAL DAILY PRN
Status: DISCONTINUED | OUTPATIENT
Start: 2025-07-21 | End: 2025-07-30 | Stop reason: HOSPADM

## 2025-07-21 RX ORDER — ASPIRIN 325 MG
1 TABLET ORAL DAILY
Status: DISCONTINUED | OUTPATIENT
Start: 2025-07-21 | End: 2025-07-21

## 2025-07-21 RX ORDER — MYCOPHENOLATE MOFETIL 250 MG/1
500 CAPSULE ORAL 2 TIMES DAILY
Status: COMPLETED | OUTPATIENT
Start: 2025-07-21 | End: 2025-07-28

## 2025-07-21 RX ORDER — FLUOXETINE HYDROCHLORIDE 40 MG/1
40 CAPSULE ORAL DAILY
Status: DISCONTINUED | OUTPATIENT
Start: 2025-07-22 | End: 2025-07-21

## 2025-07-21 RX ORDER — PREDNISONE 5 MG/1
5 TABLET ORAL DAILY
Status: DISCONTINUED | OUTPATIENT
Start: 2025-07-22 | End: 2025-07-30 | Stop reason: HOSPADM

## 2025-07-21 RX ORDER — ACETAMINOPHEN 500 MG
500-1000 TABLET ORAL EVERY 6 HOURS PRN
Status: DISCONTINUED | OUTPATIENT
Start: 2025-07-21 | End: 2025-07-21

## 2025-07-21 RX ORDER — MYCOPHENOLATE MOFETIL 250 MG/1
500 CAPSULE ORAL 2 TIMES DAILY
Qty: 540 CAPSULE | Refills: 0 | Status: ON HOLD | OUTPATIENT
Start: 2025-07-21 | End: 2025-07-30

## 2025-07-21 RX ORDER — NITROGLYCERIN 0.4 MG/1
0.4 TABLET SUBLINGUAL EVERY 5 MIN PRN
Status: DISCONTINUED | OUTPATIENT
Start: 2025-07-21 | End: 2025-07-30 | Stop reason: HOSPADM

## 2025-07-21 RX ORDER — ENTECAVIR 0.5 MG/1
0.5 TABLET, FILM COATED ORAL DAILY
Status: DISCONTINUED | OUTPATIENT
Start: 2025-07-22 | End: 2025-07-30 | Stop reason: HOSPADM

## 2025-07-21 RX ORDER — MULTIPLE VITAMINS W/ MINERALS TAB 9MG-400MCG
1 TAB ORAL
Status: DISCONTINUED | OUTPATIENT
Start: 2025-07-22 | End: 2025-07-30

## 2025-07-21 RX ORDER — ACETAMINOPHEN 650 MG/1
650 SUPPOSITORY RECTAL EVERY 4 HOURS PRN
Status: DISCONTINUED | OUTPATIENT
Start: 2025-07-21 | End: 2025-07-30 | Stop reason: HOSPADM

## 2025-07-21 RX ORDER — MYCOPHENOLATE MOFETIL 250 MG/1
500 CAPSULE ORAL 2 TIMES DAILY
Status: ON HOLD | DISCHARGE
Start: 2025-07-21 | End: 2025-07-30

## 2025-07-21 RX ORDER — HYDROXYZINE HYDROCHLORIDE 25 MG/1
25 TABLET, FILM COATED ORAL
Status: DISCONTINUED | OUTPATIENT
Start: 2025-07-21 | End: 2025-07-30 | Stop reason: HOSPADM

## 2025-07-21 RX ORDER — FUROSEMIDE 20 MG/1
20 TABLET ORAL DAILY PRN
Status: DISCONTINUED | OUTPATIENT
Start: 2025-07-21 | End: 2025-07-22

## 2025-07-21 RX ORDER — GUAIFENESIN/DEXTROMETHORPHAN 100-10MG/5
10 SYRUP ORAL EVERY 6 HOURS PRN
Status: DISCONTINUED | OUTPATIENT
Start: 2025-07-21 | End: 2025-07-30 | Stop reason: HOSPADM

## 2025-07-21 RX ORDER — SENNOSIDES 8.6 MG
1-2 TABLET ORAL 2 TIMES DAILY PRN
Status: DISCONTINUED | OUTPATIENT
Start: 2025-07-21 | End: 2025-07-30 | Stop reason: HOSPADM

## 2025-07-21 RX ORDER — POLYETHYLENE GLYCOL 3350 17 G/17G
17 POWDER, FOR SOLUTION ORAL DAILY PRN
Status: DISCONTINUED | OUTPATIENT
Start: 2025-07-21 | End: 2025-07-30 | Stop reason: HOSPADM

## 2025-07-21 RX ORDER — FLUTICASONE FUROATE AND VILANTEROL 100; 25 UG/1; UG/1
1 POWDER RESPIRATORY (INHALATION) DAILY
Status: DISCONTINUED | OUTPATIENT
Start: 2025-07-22 | End: 2025-07-30 | Stop reason: HOSPADM

## 2025-07-21 RX ORDER — ALBUTEROL SULFATE 90 UG/1
2 INHALANT RESPIRATORY (INHALATION) EVERY 6 HOURS PRN
Status: DISCONTINUED | OUTPATIENT
Start: 2025-07-21 | End: 2025-07-30 | Stop reason: HOSPADM

## 2025-07-21 RX ORDER — PETROLATUM,WHITE
OINTMENT IN PACKET (GRAM) TOPICAL DAILY PRN
Qty: 113 G | Refills: 3 | Status: SHIPPED | OUTPATIENT
Start: 2025-07-21

## 2025-07-21 RX ORDER — ENOXAPARIN SODIUM 100 MG/ML
40 INJECTION SUBCUTANEOUS EVERY 24 HOURS
Status: DISCONTINUED | OUTPATIENT
Start: 2025-07-22 | End: 2025-07-30

## 2025-07-21 RX ORDER — ISOSORBIDE MONONITRATE 60 MG/1
60 TABLET, EXTENDED RELEASE ORAL DAILY
Status: DISCONTINUED | OUTPATIENT
Start: 2025-07-22 | End: 2025-07-30 | Stop reason: HOSPADM

## 2025-07-21 RX ORDER — PANTOPRAZOLE SODIUM 40 MG/1
40 TABLET, DELAYED RELEASE ORAL DAILY
Status: DISCONTINUED | OUTPATIENT
Start: 2025-07-22 | End: 2025-07-30 | Stop reason: HOSPADM

## 2025-07-21 RX ORDER — HYDROXYZINE HYDROCHLORIDE 10 MG/1
10 TABLET, FILM COATED ORAL DAILY PRN
Status: DISCONTINUED | OUTPATIENT
Start: 2025-07-21 | End: 2025-07-22

## 2025-07-21 RX ORDER — CALCIUM CITRATE/VITAMIN D3 315MG-6.25
1 TABLET ORAL
Status: DISCONTINUED | OUTPATIENT
Start: 2025-07-22 | End: 2025-07-30 | Stop reason: HOSPADM

## 2025-07-21 RX ORDER — ROSUVASTATIN CALCIUM 10 MG/1
20 TABLET, COATED ORAL DAILY
Status: DISCONTINUED | OUTPATIENT
Start: 2025-07-22 | End: 2025-07-30 | Stop reason: HOSPADM

## 2025-07-21 RX ORDER — NALTREXONE HYDROCHLORIDE 50 MG/1
50 TABLET, FILM COATED ORAL DAILY
Status: DISCONTINUED | OUTPATIENT
Start: 2025-07-22 | End: 2025-07-30 | Stop reason: HOSPADM

## 2025-07-21 RX ADMIN — MYCOPHENOLATE MOFETIL 500 MG: 250 CAPSULE ORAL at 21:09

## 2025-07-21 RX ADMIN — ROSUVASTATIN CALCIUM 20 MG: 20 TABLET, FILM COATED ORAL at 08:24

## 2025-07-21 RX ADMIN — EMPAGLIFLOZIN 10 MG: 10 TABLET, FILM COATED ORAL at 08:23

## 2025-07-21 RX ADMIN — ASPIRIN 81 MG CHEWABLE TABLET 81 MG: 81 TABLET CHEWABLE at 08:23

## 2025-07-21 RX ADMIN — NALTREXONE HYDROCHLORIDE 50 MG: 50 TABLET, FILM COATED ORAL at 08:23

## 2025-07-21 RX ADMIN — ENTECAVIR 0.5 MG: 0.5 TABLET ORAL at 08:24

## 2025-07-21 RX ADMIN — ACETAMINOPHEN 650 MG: 325 TABLET ORAL at 13:54

## 2025-07-21 RX ADMIN — ENOXAPARIN SODIUM 40 MG: 40 INJECTION SUBCUTANEOUS at 16:09

## 2025-07-21 RX ADMIN — Medication 12.5 MG: at 17:54

## 2025-07-21 RX ADMIN — PREDNISONE 5 MG: 5 TABLET ORAL at 08:23

## 2025-07-21 RX ADMIN — PSYLLIUM HUSK 1 PACKET: 3.4 POWDER ORAL at 08:23

## 2025-07-21 RX ADMIN — MYCOPHENOLATE MOFETIL 500 MG: 250 CAPSULE ORAL at 08:23

## 2025-07-21 RX ADMIN — NYSTATIN 500000 UNITS: 100000 SUSPENSION ORAL at 08:24

## 2025-07-21 RX ADMIN — ISOSORBIDE MONONITRATE 60 MG: 60 TABLET, EXTENDED RELEASE ORAL at 08:23

## 2025-07-21 RX ADMIN — GUAIFENESIN 200 MG: 200 SOLUTION ORAL at 08:36

## 2025-07-21 RX ADMIN — Medication 1 TABLET: at 12:14

## 2025-07-21 RX ADMIN — GUAIFENESIN 200 MG: 200 SOLUTION ORAL at 13:54

## 2025-07-21 RX ADMIN — GUAIFENESIN 200 MG: 200 SOLUTION ORAL at 01:53

## 2025-07-21 RX ADMIN — ACETAMINOPHEN 650 MG: 325 TABLET ORAL at 03:58

## 2025-07-21 RX ADMIN — NYSTATIN 500000 UNITS: 100000 SUSPENSION ORAL at 16:09

## 2025-07-21 RX ADMIN — FLUOXETINE HYDROCHLORIDE 60 MG: 40 CAPSULE ORAL at 08:23

## 2025-07-21 RX ADMIN — FLUTICASONE FUROATE AND VILANTEROL TRIFENATATE 1 PUFF: 100; 25 POWDER RESPIRATORY (INHALATION) at 08:24

## 2025-07-21 RX ADMIN — NYSTATIN 500000 UNITS: 100000 SUSPENSION ORAL at 12:14

## 2025-07-21 RX ADMIN — PANTOPRAZOLE SODIUM 40 MG: 40 TABLET, DELAYED RELEASE ORAL at 08:23

## 2025-07-21 RX ADMIN — Medication 3 MG: at 22:59

## 2025-07-21 RX ADMIN — ACETAMINOPHEN 650 MG: 325 TABLET ORAL at 08:36

## 2025-07-21 RX ADMIN — LATANOPROST 1 DROP: 50 SOLUTION/ DROPS OPHTHALMIC at 21:09

## 2025-07-21 ASSESSMENT — ACTIVITIES OF DAILY LIVING (ADL)
ADLS_ACUITY_SCORE: 49
ADLS_ACUITY_SCORE: 44
ADLS_ACUITY_SCORE: 44
ADLS_ACUITY_SCORE: 49
ADLS_ACUITY_SCORE: 44
ADLS_ACUITY_SCORE: 49
ADLS_ACUITY_SCORE: 57
ADLS_ACUITY_SCORE: 49
ADLS_ACUITY_SCORE: 44
ADLS_ACUITY_SCORE: 49
ADLS_ACUITY_SCORE: 44
ADLS_ACUITY_SCORE: 49
ADLS_ACUITY_SCORE: 49
ADLS_ACUITY_SCORE: 44
ADLS_ACUITY_SCORE: 49

## 2025-07-21 NOTE — PROGRESS NOTES
[facility]  Transplant Nephrology Progress Note  Date of Admission:  7/9/2025  Today's Date: 07/21/2025    Recommendations:   - Daily weights and blood pressure.   - Continue mycophenolate 500 mg twice a day and prednisone 5 mg every day on discharge.    Assessment & Plan   # DDKT: Stable creatinine, at baseline. Good urine output.     - Baseline Creatinine: ~ 0.7-0.9   - Proteinuria: Normal (<0.2 grams)   - DSA Hx: Not checked recently due to time from transplant   - Last cPRA: unknown%   - BK Viremia: Not checked recently due to time from transplant   - Kidney Tx Biopsy Hx: No biopsy history.    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     # Immunosuppression Prior to Admission: Mycophenolate mofetil (dose 750 mg every 12 hours) and Prednisone (dose 5 mg daily)   - Present Immunosuppression: Mycophenolate mofetil (dose 500 mg every 12 hours) and Prednisone (dose 5 mg daily)    - Induction with Recent Transplant:  Not known due to time from transplant   - Continue with intensive monitoring of immunosuppression for efficacy and toxicity.   - Historical Changes in Immunosuppression: None   - Changes: Not at this time    # Infection Prevention:   Last CD4 Level: Not checked recently  - PJP: None      - CMV IgG Ab High Risk Discordance (D+/R-) at time of transplant: Unknown  Present CMV Serostatus: Unknown  - EBV IgG Ab High Risk Discordance (D+/R-) at time of transplant: Unknown  Present EBV Serostatus: Unknown    # Hypertension and volume: Controlled;  Goal BP: < 140/90 (Hospitalization goal)   - Likely losing muscle mass and weight. EDW now closer to 152 lbs.    - On Toprol 12.5 mg twice a day.   - Changes: Not at this time    # Anemia in Chronic Renal Disease: Hgb: Stable      CAROL ANN: No   - Iron studies: Not checked recently    # Mineral Bone Disorder:    - Calcium; level: Normal        On supplement: Yes; calcium citrate 315 mg daily.  - Phosphorus; level:  Normal        On supplement: No    # Electrolytes:  - Potassium; level: Normal        On supplement: No  - Magnesium; level: Normal        On supplement: No  - Bicarbonate; level: Normal        On supplement: No  - Sodium; level: Normal    # Acute hypoxic respiratory failure:  # Pulmonary edema, resolved.   # Viral versus Atypical Pneumonia: + Rhino/enterovirus. CT PE negative. Gram + Bcx on 07/09, likely contaminant. Repeat blood cultures 07/10. Sputum culture 7/12 likely contaminated, per report. Repeat pending. CXR 7/13 with pulmonary edema. Started on ceftriaxone and currently on prednisone 40 mg daily and doxycycline.    - CXR 7/16 with pulmonary edema and bilateral pleural effusions.   - Management per primary team. Pulmonology following.    # Acute decompensated heart failure: echo in 2023 with no evidence of HF. BNP 1800 on 7/13. PTA on 20 mg lasix PO daily.    - Echo 7/14; EF 55-65% with grade I diastolic dysfunction. No pericardial effusion.   - Management per primary team.     # Other Significant PMH:   - CAD, s/p CABG: Last cardiac stress test was abnormal in 6/2023 (but unchanged from prior testing in 2022).  Coronary angiogram 9/2021 that showed some possible obstructive disease in the 1st diagonal, but unable to stent it.  Bypass grafts were open.  Plan is for medical management. Last cardiac echo (8/2023) showed LVEF ~ 60-65%. Normal right ventricular systolic function. Normal diastolic dysfunction.    - Provoked PE (8/2023): after right hip revision surgery. Completed AC.   - Chronic Hepatitis B: Stable on entecavir. Follows here with hepatology.   - Asthma: Some increase in shortness of breath, but felt more cardiac in origin.  Patient is stable on inhalers.   - GERD: Asymptomatic on pantoprazole, but with h/o ulcer, will continue on medication.   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    Niraj Freire MD  Transplant Nephrology    Interval History  No events overnight. Does reports some lightheaded on standing, but resolves after a few seconds. Able to work with PT. Still requiring 2 L NC.  Able to lay flat without shortness of breath. No leg swelling. However, still with wheezing and cough.     Review of Systems   4 point ROS was obtained and negative except as noted in the Interval History.    MEDICATIONS:  Current Facility-Administered Medications   Medication Dose Route Frequency Provider Last Rate Last Admin    aspirin (ASA) chewable tablet 81 mg  81 mg Oral Daily Whalen, Alona, DO   81 mg at 07/21/25 0823    calcium citrate-vitamin D (CITRACAL) 315-6.25 MG-MCG per tablet 1 tablet  1 tablet Oral Daily with lunch Blake Plasencia MD   1 tablet at 07/21/25 1214    empagliflozin (JARDIANCE) tablet 10 mg  10 mg Oral Daily Whalen, Alona, DO   10 mg at 07/21/25 0823    enoxaparin ANTICOAGULANT (LOVENOX) injection 40 mg  40 mg Subcutaneous Q24H Whalen, Alona, DO   40 mg at 07/20/25 1547    entecavir (BARACLUDE) tablet 0.5 mg  0.5 mg Oral Daily Whalen, Alona, DO   0.5 mg at 07/21/25 0824    FLUoxetine (PROzac) capsule 60 mg  60 mg Oral Daily Blake Plasencia MD   60 mg at 07/21/25 0823    fluticasone-vilanterol (BREO ELLIPTA) 100-25 MCG/ACT inhaler 1 puff  1 puff Inhalation Daily Whalen, Alona, DO   1 puff at 07/21/25 0824    isosorbide mononitrate (IMDUR) 24 hr tablet 60 mg  60 mg Oral Daily Whalen, Alona, DO   60 mg at 07/21/25 0823    metoprolol succinate ER (TOPROL-XL) 24 hr half-tab 12.5 mg  12.5 mg Oral Daily Whalen, Alona, DO   12.5 mg at 07/20/25 2001    multivitamin w/minerals (THERA-VIT-M) tablet 1 tablet  1 tablet Oral Daily with lunch Blake Plasencia MD   1 tablet at 07/21/25  1214    mycophenolate (GENERIC EQUIVALENT) capsule 500 mg  500 mg Oral BID IS Lisa Levine APRN CNP   500 mg at 25 0823    naltrexone (DEPADE/REVIA) tablet 50 mg  50 mg Oral Daily Whalen, Alona, DO   50 mg at 25 0823    nystatin (MYCOSTATIN) suspension 500,000 Units  500,000 Units Swish & Swallow 4x Daily Blake Plasencia MD   500,000 Units at 25 1214    pantoprazole (PROTONIX) EC tablet 40 mg  40 mg Oral Daily Whalen, Alona, DO   40 mg at 25 0823    polyethylene glycol (MIRALAX) Packet 17 g  17 g Oral Daily Michael Mas, DO   17 g at 25 0757    predniSONE (DELTASONE) tablet 5 mg  5 mg Oral Daily Ryan Isbell MD   5 mg at 25 0823    psyllium (METAMUCIL/KONSYL) Packet 1 packet  1 packet Oral Daily Blake Plasencia MD   1 packet at 25 0823    rosuvastatin (CRESTOR) tablet 20 mg  20 mg Oral Daily Whalen, Alona, DO   20 mg at 25 0824    sodium chloride (PF) 0.9% PF flush 3 mL  3 mL Intracatheter Q8H NE Whalen, Alona, DO   3 mL at 25 1547     Current Facility-Administered Medications   Medication Dose Route Frequency Provider Last Rate Last Admin    Patient may continue current oral medications   Does not apply Continuous PRN Heriberto Darling MD           Physical Exam   Temp  Av.3  F (36.8  C)  Min: 97.8  F (36.6  C)  Max: 98.8  F (37.1  C)      Pulse  Av  Min: 63  Max: 80 Resp  Av.9  Min: 18  Max: 44  FiO2 (%)  Av.2 %  Min: 50 %  Max: 65 %  SpO2  Av.2 %  Min: 90 %  Max: 99 %     /76   Pulse 61   Temp 98.5  F (36.9  C) (Oral)   Resp 16   Wt 69.3 kg (152 lb 12.8 oz)   SpO2 99%   BMI 23.93 kg/m      Admit       GENERAL APPEARANCE: alert and no distress  HENT: mouth without ulcers or lesions  RESP: bilateral crackles and expiratory wheezes.   CV: regular rhythm, normal rate  EDEMA: no LE edema bilaterally  ABDOMEN: soft, nondistended, nontender  MS: extremities normal - no gross deformities noted, no evidence of inflammation in joints,  no muscle tenderness  SKIN: no rash, numerous keratoses  TX KIDNEY: normal  DIALYSIS ACCESS: LUE AV Fistula (No thrill)    Data   All labs reviewed by me.  CMP  Recent Labs   Lab 07/21/25  0730 07/19/25  0624 07/18/25  0624 07/17/25  0527 07/16/25  0533 07/15/25  1541 07/15/25  0613   NA  --  135 133* 132* 135  --  137   POTASSIUM  --  4.0 4.0 3.8 3.5   < > 3.9   CHLORIDE  --  96* 93* 93* 93*  --  96*   CO2  --  31* 29 27 29  --  27   ANIONGAP  --  8 11 12 13  --  14   GLC 84 82 85 85 85  --  86   BUN  --  9.0 11.8 18.0 23.2*  --  24.8*   CR  --  0.66* 0.72 0.64* 0.68  --  0.68   GFRESTIMATED  --  >90 >90 >90 >90  --  >90   HEMA  --  9.1 9.5 9.6 9.8  --  9.8   MAG  --  2.0 2.0 1.9 1.8   < > 1.9   PHOS  --  3.2 2.4* 3.0 3.6   < > 3.2   ALBUMIN  --   --   --  2.9* 3.2*  --  3.2*    < > = values in this interval not displayed.     CBC  No lab results found in last 7 days.    INR  No lab results found in last 7 days.    ABG  Recent Labs   Lab 07/19/25  0946   O2PER 2        Urine Studies  Recent Labs   Lab Test 07/09/25  1346 08/30/23  1856 08/20/23  1245 08/11/23  1928 06/03/20  1307   COLOR Yellow Yellow Light Yellow Light Yellow Yellow   APPEARANCE Slightly Cloudy* Clear Clear Clear Clear   URINEGLC Negative Negative Negative Negative Negative   URINEBILI Negative Negative Negative Negative Negative   URINEKETONE Trace* 10* 60* 10* Negative   SG 1.015 1.021 1.015 1.009 1.008   UBLD Negative Negative Negative Negative Negative   URINEPH 6.0 5.0 6.0 6.0 6.5   PROTEIN 20* 10* 20* 10* Negative   UROBILINOGEN  --   --   --   --  <2.0 E.U./dL   NITRITE Negative Negative Negative Negative Negative   LEUKEST Negative Negative Negative Negative Negative   RBCU 1 <1 <1 0  --    WBCU 7* 3 1 3  --      Recent Labs   Lab Test 10/28/20  0902 10/01/19  0942 04/02/19  0917 10/23/18  1122 06/21/18  1209   UTPG 0.23* 0.26* 0.23* 0.21* 0.12     PTH  No lab results found.  Iron Studies  Recent Labs   Lab Test 10/02/23  0943   IRON 30*       IRONSAT 10*   LA 50       IMAGING:  All imaging studies reviewed by me.

## 2025-07-21 NOTE — PLAN OF CARE
Goal Outcome Evaluation:      Plan of Care Reviewed With: patient    Overall Patient Progress: no changeOverall Patient Progress: no change    Outcome Evaluation: Medically ready to discharge. Waiting for a TCU bed    /72 (BP Location: Left arm)   Pulse 57   Temp 97.8  F (36.6  C) (Oral)   Resp 18   Wt 69.3 kg (152 lb 12.8 oz)   SpO2 93%   BMI 23.93 kg/m      Shift: 2560-9362  Isolation Status: droplet - rhinovirus  VS: soft pressures on 2L NC, afebrile  Neuro: Aox4  Behaviors: calm, cooperative  BG: none  Labs: AM labs pending  Pain/Nausea: c/o of headache overnight. Denies nausea  PRN: tylenol x1, robitussin x1 for cough, melatonin x1  Diet: low fat, low Na <2400 mg  LDAs: RPIV x1 SL  GI/: voids via urinal; LBM 07/20  Skin: scattered melanoma lesions   Mobility: Ax1 with walker and GB  Plan: Discharge to TCU when bed available. Continue with POC

## 2025-07-21 NOTE — PLAN OF CARE
Physical Therapy Discharge Summary    Reason for therapy discharge:    Discharged to transitional care facility.    Progress towards therapy goal(s). See goals on Care Plan in Psychiatric electronic health record for goal details.  Goals partially met.  Barriers to achieving goals:   discharge from facility.    Therapy recommendation(s):    Continued therapy is recommended.  Rationale/Recommendations:  Recommend continued PT intervention at TCU to progress safety and IND with functional mobility towards PLOF.

## 2025-07-21 NOTE — PROGRESS NOTES
Care Management Discharge Note    Discharge Date: 07/21/2025       Discharge Disposition: Transitional Care    [facility]  [address]  4th [place] [address]  P: [phone]     Discharge Services: Transportation Services    Discharge DME: Oxygen    Discharge Transportation: agency, family or friend will provide    Private pay costs discussed: Not applicable    Does the patient's insurance plan have a 3 day qualifying hospital stay waiver?  Yes     Which insurance plan 3 day waiver is available? Alternative insurance waiver    Will the waiver be used for post-acute placement? Yes    PAS Confirmation Code: [id number]  Patient/family educated on Medicare website which has current facility and service quality ratings: yes    Education Provided on the Discharge Plan: Yes  Persons Notified of Discharge Plans: pt, nursing, attending, TCU.  Patient/Family in Agreement with the Plan: yes    Handoff Referral Completed: Yes, JUWAN PCP: Internal handoff referral completed    Additional Information:  SW received updates from the [facility] TCUs that pt was not accepted due to no bed available, and the other one could not accommodate the medication costs.     TAIMCA met with pt at bedside and provided a medicare TCU list in the [facility] and the [facility] for more additional options. Pt reported that [facility] is fine [facility] and other [facility] options in the area. Pt reported that he would let the SW know if he finds something on the list that he really wants on there.     TAMICA then received a call from [facility] regarding pt needing to know the location of where the pt will be going once discharged. TAMICA confirmed that there is currently no placement yet, but will notify the worker. [place]: [phone] ext: [phone].     TAMICA then submitted additional referrals for the pt.    TAMICA then received a message from  TCU liaison that pt would be able to discharge to facility today, but they need to check with  their physician. Liaison confirmed to scheduled a tentative ride.     TAMICA then scheduled a tentative wheelchair ride between 3:30pm-4:30pm to  TCU. TAMICA informed bedside nurse and charge nurse about the situation, talked to the pt again at bedside who agreed to the plan, and notified the attending, who confirmed to have the discharge orders in before.     TAMICA then received a message from liaison that pt was accepted and everything should be good to go. TAMICA provided liaison with the provider name as the liaison would need to set up the handoff.     TAMICA reached out to senior linkage  about the updated news, but was only able to leave a brief VM regarding the placement and information about the placement.     CHW then provided the IMM. No further assistance needed by DYLAN.     ASAD Acosta, LGSW  7B   [facility]  Ph: [phone]  Available on Vocera: 7B MS DAVEY

## 2025-07-21 NOTE — PROGRESS NOTES
"Patient is a 65 year old male  admitted to room 429 via wheelchair.  Patient is alert and oriented X 4. See Epic for VS and assessment.  Patient is able to transfer assist of one using walker. Patient was settled into their room, shown call light, tv, mealtimes etc. Oriented to unit. Will continue monitoring pain level and VS. Notifying MD with any concerns.  Follow MD orders for cares and medications.    Flu Vaccine:   - Yes, up to date (patient had vaccine this flu season)      COVID Vaccine:   - Yes, up to date (had vaccine this season)       Pneumococcal Vaccine:   - Yes, up to date       Ethnicity:   Race: White  Dentures: No  Hearing Aid: No  Smoker:  No  Glasses: Yes  Falls 0-1 mo: 1 mo: 0  Prior device use: Other cane   Advanced Care Directive Referral to Social Work?Yes      7/22/25 Addendum by Gemma Rushing:   Flu: last administered 10/10/24. Currently not in flu season.   COVID: last administered 10/10/24. Nursing to offer booster.   Pneumococcal: rcvd 13 on 10/8/14 and 23 on 10/23/18. Per CDC, \"Give one dose of PCV20 or PCV21 at least 5 years after the last pneumococcal vaccine dose. Regardless of which vaccine is used (PCV20 or PCV21), their pneumococcal vaccinations are complete.\" Nursing to offer booster.     "

## 2025-07-21 NOTE — PROGRESS NOTES
DISCHARGE:  Patient discharging to [facility]. Transported out by wheelchair. VSS on 2 L NC. Continue with follow-up care through TCU.

## 2025-07-21 NOTE — DISCHARGE SUMMARY
[facility]  Discharge Summary - Medicine & Pediatrics       Date of Admission:  7/9/2025  Date of Discharge:  7/21/2025  4:20 PM  Discharging Provider: Heriberto Darling  Discharge Service: Medicine Service, NUBIA TEAM 2    Discharge Diagnoses   See below    Clinically Significant Risk Factors          Follow-ups Needed After Discharge   Follow-up Appointments       Follow Up and recommended labs and tests      Follow up with primary care provider in 2-3 weeks.  No follow up labs or test are needed.                Unresulted Labs Ordered in the Past 30 Days of this Admission       No orders found from 6/9/2025 to 7/10/2025.          Discharge Disposition   Discharged to short-term care facility  Condition at discharge: Stable    Hospital Course   Jerad Ross was admitted on 7/9/2025 for acute hypoxic respiratory failure.  The following problems were addressed during his hospitalization:    # Acute hypoxic respiratory failure, multifactorial etiology   # Viral or atypical pneumonia  # Acute on chronic heart failure with preserved ejection fraction  # Concern for Acute respiratory distress syndrome  Presented with hypoxia, requiring BiPAP on arrival.  Most likely viral etiology as RPP positive for rhinovirus/enterovirus though an uncommon cause.  There were concern for hypervolemia, but respiratory status did not improve significantly after IV diuresis and patient appeared at his dry weight.  After a short while, hypoxia improved without any major interventions other than scheduled nebulizers and pulmonary hygiene.  -Patient was discharged with home oxygen and pulse oximeter  -May consider placing outpatient pulmonology follow-up in 4-6 weeks with updated PFTs and repeat noncontrast CT scan    # Heart failure with preserved ejection fraction, new diagnosis  # History of non-ST elevation myocardial (2014)  TTE showed grade 1 diastolic dysfunction, after discussing with  transplant nephrology and the risks and benefits with the patient, initiated empagliflozin.  - Nephrology recommends furosemide 20 mg PO as needed for >5 pound weight gain about dry weight    # Elevated troponins, resolved  # Type 2 demand ischemia   Likely secondary to demand ischemia in setting of respiratory failure and CKD. Without chest pain or signs of fluid overload on exam. EKG without acute ischemic changes.  TTE as above.    # S/p single renal transplant  # Chronic immunosuppression  Transplant nephrology team reduced PTA mycophenolate to 500 mg twice daily    # Oral thrush, resolved  S/p nystatin swish and swallow     #Lactic acidosis, resolved    Likely secondary to hypoxia       Consultations This Hospital Stay   PHYSICAL THERAPY ADULT IP CONSULT  OCCUPATIONAL THERAPY ADULT IP CONSULT  NEPHROLOGY KIDNEY/PANCREAS TRANSPLANT ADULT IP CONSULT  PULMONARY GENERAL ADULT IP CONSULT  SPIRITUAL HEALTH SERVICES IP CONSULT  SOCIAL WORK IP CONSULT  CARE MANAGEMENT / SOCIAL WORK IP CONSULT  PHYSICAL THERAPY ADULT IP CONSULT  OCCUPATIONAL THERAPY ADULT IP CONSULT    Code Status   No CPR- Do NOT Intubate       The patient was discussed with Dr. Yeni Louis Alp, MD  [address] [address]  Phone: [phone]  ______________________________________________________________________    Physical Exam   Vital Signs: Temp: 98.5  F (36.9  C) Temp src: Oral BP: 108/76 Pulse: 61   Resp: 16 SpO2: 99 % O2 Device: Nasal cannula Oxygen Delivery: 2 LPM  Weight: 152 lbs 12.8 oz    General appearance: Looks comfortable, in no apparent distress  HEENT: Neck supple, sclera anicteric  CV: S1 and S2 well heard without any added sounds  Resp: Normal respiratory effort on 1 L NC, clear to ausculation bilaterally without any added sounds  Abd: Soft, non-tender, non-distended  Extr: No peripheral edema, including sacral region  Skin: Warm and dry  Neuro:  Alert and oriented x4, appeared non-focal        Primary Care Physician   Aston Perry    Discharge Orders      General info for SNF    Length of Stay Estimate: Short Term Care: Estimated # of Days <30  Condition at Discharge: Improving  Level of care:skilled   Rehabilitation Potential: Good  Admission H&P remains valid and up-to-date: Yes  Recent Chemotherapy: N/A  Use Nursing Home Standing Orders: Yes     Mantoux instructions    Give two-step Mantoux (PPD) Per Facility Policy Yes     Follow Up and recommended labs and tests    Follow up with primary care provider in 2-3 weeks.  No follow up labs or test are needed.     Reason for your hospital stay    You were hospitalized because of breathing difficulty.  We think this was multifactorial including chronic lung disease, new-onset mild heart failure, and potentially viral pneumonia.  You completed your treatment, and now you are getting discharged to a transitional care unit to work with physical therapy to get your strength back.     Intake and output    Every shift     Daily weights    Call Provider for weight gain of more than 2 pounds per day or 5 pounds per week.     Activity - Up ad muriel     No CPR- Do NOT Intubate     Physical Therapy Adult Consult    Evaluate and treat as clinically indicated.    Reason:  Weakness     Occupational Therapy Adult Consult    Evaluate and treat as clinically indicated.    Reason:  Weakness     Oxygen (SNF/TCU) Discharge     Nebulizer and Nebulizer Supplies    Nebulizer Documentation  I attest that I have seen and evaluated Jerad Ross. He has a diagnosis of J18.9 - Pneumonia, unspecified organism and a nebulizer machine is needed to administer medication to improve breathing passages.     I, the undersigned, certify that the above prescribed supplies are medically necessary for this patient and is both reasonable and necessary in reference to accepted standards of medical and necessary in reference to accepted  standards of medical practice in the treatment of this patient's condition and is not prescribed as a convenience.     Other Respiratory Supplies Order    DME Documentation:   Describe the reason for need to support medical necessity: Home O2.     I, the undersigned, certify that the above prescribed supplies are medically necessary for this patient and is both reasonable and necessary in reference to accepted standards of medical and necessary in reference to accepted standards of medical practice in the treatment of this patient's condition and is not prescribed as a convenience.     Diet    Follow this diet upon discharge: Current Diet:Orders Placed This Encounter      Low Saturated Fat Na <2400 mg       Significant Results and Procedures   Results for orders placed or performed during the hospital encounter of 07/09/25   CT Chest w Contrast    Narrative    EXAM: CT CHEST W CONTRAST 7/9/2025 1:11 PM     DEMOGRAPHICS: Male of 65 years    INDICATION: sob    COMPARISON: CT chest pulmonary embolus and 8/20/2023    TECHNIQUE: Helical CT imaging of the chest was obtained without  contrast. Multiplanar post-processed and MIP reformats were obtained  reviewed.     FINDINGS:    LINES/TUBES: None.    LUNG: Mosaic/geographic, sharply marginated groundglass opacities  diffusely throughout the right upper lobe, bilateral lower lobes right  greater than left, and apical anterior left upper lobe with scattered  interlobular septal thickening & superimposed bronchovascular  groundglass nodularity. Questionable subpleural sparing in the right  upper lobe. There is sparing of the right middle lobe.    LARGE AIRWAYS: Patent tracheobronchial tree without intraluminal mass.    PLEURA: No pneumothorax or pleural effusion. No pleural mass or  calcification.    VESSELS: The pulmonary artery and thoracic aorta are normal in  caliber.    HEART: No cardiomegaly. No pericardial effusion. Mild coronary  atherosclerotic calcifications.  Limited evaluation of valves secondary  to lack of contrast.     MEDIASTINUM AND ALOK: Postoperative changes CABG with saphenous vein  graft & LIMA to LAD. No suspicious lymphadenopathy.     CHEST WALL: Midline sternotomy wires & plate and screw fixation  hardware intact.    LOWER NECK: Thyroid unremarkable.    UPPER ABDOMEN: Autonephrectomy. Left upper quadrant large splenules.  Diverticular disease of the proximal large intestine.    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 Mild thoracic  spine degenerative changes. No acute osseous abnormality. No  suspicious bony lesions. Unremarkable soft tissues. Multiple punctate  sclerotic foci in vertebral column and posterior left rib #8.      Impression    IMPRESSION:   1. Mosaic round glass attenuation with superimposed bronchovascular  groundglass nodules. These findings are nonspecific. Top differential  is ARDS. Differential also includes atypical infection, early diffuse  alveolar hemorrhage, organizing pneumonia or early pulmonary edema.  Recommend pulmonary consultation and if indicated bronchial alveolar  lavage for further differentiation.    I have personally reviewed the examination and initial interpretation  and I agree with the findings.    ESMER KING MD         SYSTEM ID:  [id number]   XR Chest Port 1 View    Narrative    Exam: XR CHEST PORT 1 VIEW, 7/9/2025 12:27 PM    Comparison: CT chest from 8/2/2021    History: sob    Findings:  Portable AP view of the semiupright chest. Trachea is midline.  Cardiomediastinal silhouette is stable. No prominent perihilar  opacities, right greater than left. No pneumothorax or pleural  effusion. The visualized upper abdomen is unremarkable. Severe  degenerative change of the left glenohumeral joint with osseous  proliferation.      Impression    Impression:   1.  Prominent right perihilar opacities, which can be seen in the  setting of pulmonary edema.  2. Severe degenerative changes of the left shoulder.    I have personally reviewed the examination and initial interpretation  and I agree with the findings.    GASTON CAMARENA MD         SYSTEM ID:  [id number]   CT Chest Pulmonary Embolism w Contrast    Narrative    EXAM: CT CHEST PULMONARY EMBOLISM W CONTRAST  LOCATION: [facility]  DATE: 7/9/2025    INDICATION: Rule out pulmonary embolism. persistent tachypnea.  COMPARISON: Noncontrast chest CT July 9, 2020 5/20/2020 5:00 PM and CT anterior chest August 20, 2023.  TECHNIQUE: CT chest pulmonary angiogram during arterial phase injection of IV contrast. Multiplanar reformats and MIP reconstructions were performed. Dose reduction techniques were used.   CONTRAST: 61  ml Isovue 370    FINDINGS:  ANGIOGRAM CHEST: Pulmonary arteries are normal caliber and negative for pulmonary emboli. Thoracic aorta is negative for dissection. No CT evidence of right heart strain. 39 mm ascending thoracic aorta at the mid ascending level as seen on coronal   reformatted images is unchanged.    LUNGS AND PLEURA: Patchy parenchymal opacity throughout the lungs bilaterally appears similar as on the CT earlier today. No pleural effusion or central airway lesion.    MEDIASTINUM/AXILLAE: Prior sternotomy and coronary bypass. Unchanged dilatation of the right atrium. No lymphadenopathy or pericardial effusion.    CORONARY ARTERY CALCIFICATION: Previous intervention.    UPPER ABDOMEN: Prior splenectomy with unchanged degenerative splenic tissue.    MUSCULOSKELETAL: Advanced arthropathic change at both shoulders.      Impression    IMPRESSION:  1.  No evidence of pulmonary embolism.  2.  Patchy parenchymal opacity throughout the lungs bilaterally appears similar as on the CT earlier today.     XR Chest 2 Views    Narrative    EXAM: XR CHEST 2 VIEWS  7/13/2025  9:59 AM      HISTORY: rule out pulmonary consolidation. Evaluate for pleurual  effusoin    COMPARISON: CT chest dated 7/9/2025.    FINDINGS: Two views of the chest. Postsurgical changes of the chest  with intact median sternotomy. Trachea is midline. Stable  cardiomediastinal silhouette. Redemonstrated mixed perihilar and  bibasilar opacities. Increased mixed interstitial and airspace  opacities noted in the left mid to lower lung field. No significant  pleural effusion or pneumothorax. Stable osseous and soft tissue  structures.      Impression    IMPRESSION:   1.  Interval increased mixed interstitial and airspace opacities in  the left mid to lower lung field could represent edema, atelectasis  versus infection.  2.  Similar patchy perihilar opacities likely represent  edema/atelectasis.    JULIO WILLIS MD         SYSTEM ID:  [id number]   XR Chest Port 1 View    Narrative    Exam: XR CHEST PORT 1 VIEW, 7/16/2025 2:08 PM    Comparison: Chest x-ray dated 7/13/2025    History: AHRF, pulm edema    Findings:  Portable AP view of the chest.  Worsening bilateral interstitial opacity extending from the hilum to  the periphery, associated with bulky mediastinal vasculature,  suggestive of interstitial pulmonary edema. There is associated stable  blunting of bilateral costophrenic angles, suggestive of bilateral  trace pleural effusions. The heart is mildly enlarged. Left-sided  mediastinal clips are noted. Trachea is roughly in the midline.  Multiple calcifications on the left upper abdominal quadrant, possibly  renal calculi. Abdominal surgical clips are also visualized.   Median sternotomy wires appear intact on AP view.   Advanced osteoarthritis of bilateral shoulder joints, with visible  bone degeneration of the left glenoid glenoid.       Impression    Impression:   1.  Worsening pulmonary edema and fluid overload.  2.  Stable bilateral trace pleural effusions.  3.  Advanced osteoarthritis of bilateral shoulder  joints.    I have personally reviewed the examination and initial interpretation  and I agree with the findings.    ESMER KING MD         SYSTEM ID:  [id number]   Echocardiogram Complete     Value    LVEF  55-60%    Narrative    [id number]  [id number]  [id number]  [id number]^ANTONIO^BARBARA^     [facility],[place]  Echocardiography Laboratory  [address] [place] [address]     Name: MANSI LIRA  MRN: [id number]  : 1960  Study Date: 2025 08:39 AM  Age: 65 yrs  Gender: Male  Patient Location: [facility]  Reason For Study: Heart Failure  Ordering Physician: BARBARA ALVAREZ  Performed By: Carlos Manuel Chris RDCS     BSA: 1.9 m2  Height: 67 in  Weight: 171 lb  HR: 62  BP: 150/92 mmHg  ______________________________________________________________________________  Procedure  Echocardiogram with two-dimensional, color and spectral Doppler.  ______________________________________________________________________________  Interpretation Summary  Global and regional left ventricular function is normal with an EF of 55-60%.  Global right ventricular function is mildly reduced.  No hemodynamically significant valve abnormalties.  The inferior vena cava was normal in size with preserved respiratory  variability.  No pericardial effusion.     This study was compared with the study from 23. Global RV function is  slightly lower.  ______________________________________________________________________________  Left Ventricle  Global and regional left ventricular function is normal with an EF of 55-60%.  Left ventricular size is normal. Relative wall thickness is increased  consistent with concentric remodeling. Grade I or early diastolic dysfunction.  No regional wall motion abnormalities are seen.     Right Ventricle  The right ventricle is normal size. Global right ventricular function is  mildly reduced.     Atria  The left atrium appears normal. Moderate right atrial enlargement is  present.     Mitral Valve  The mitral valve is normal.     Aortic Valve  The aortic valve is tricuspid. Trace aortic insufficiency is present.     Tricuspid Valve  The tricuspid valve is normal. Trace tricuspid insufficiency is present.  Estimated pulmonary artery systolic pressure is 33 mmHg plus right atrial  pressure. Pulmonary artery systolic pressure is normal.     Pulmonic Valve  The pulmonic valve is normal. Trace pulmonic insufficiency is present.     Vessels  Normal diameter aortic root and proximal ascending aorta. The inferior vena  cava was normal in size with preserved respiratory variability.     Pericardium  No pericardial effusion is present.     Compared to Previous Study  This study was compared with the study from 23 .  ______________________________________________________________________________  MMode/2D Measurements & Calculations     IVSd: 1.2 cm  LVIDd: 4.3 cm  LVIDs: 2.8 cm  LVPWd: 1.2 cm  FS: 34.2 %  LV mass(C)d: 184.5 grams  LV mass(C)dI: 97.5 grams/m2  asc Aorta Diam: 3.6 cm  LVOT diam: 2.4 cm  LVOT area: 4.6 cm2  Asc Ao diam index BSA (cm/m2): 1.9  Asc Ao diam index Ht(cm/m): 2.1  LA Volume Index (BP): 22.0 ml/m2  RWT: 0.57     TAPSE: 1.2 cm     Doppler Measurements & Calculations  MV E max jefferson: 37.6 cm/sec  MV A max jefferson: 66.2 cm/sec  MV E/A: 0.57  MV dec slope: 136.2 cm/sec2  MV dec time: 0.28 sec  PA acc time: 0.08 sec  TR max jefferson: 290.0 cm/sec  TR max P.6 mmHg  E/E' av.6  Lateral E/e': 6.8  Medial E/e': 10.3     ______________________________________________________________________________  Report approved by: Balta Smith MD on 2025 10:06 AM           *Note: Due to a large number of results and/or encounters for the requested time period, some results have not been displayed. A complete set of results can be found in Results Review.       Discharge Medications      Review of your medicines        START taking        Dose / Directions   empagliflozin 10 MG  Tabs tablet  Commonly known as: JARDIANCE  Used for: Heart failure with preserved ejection fraction, NYHA class I (H)      Dose: 10 mg  Start taking on: July 22, 2025  Take 1 tablet (10 mg) by mouth daily.  Quantity: 90 tablet  Refills: 1     white petrolatum gel  Used for: On supplemental oxygen by nasal cannula, Skin irritation      Apply topically daily as needed for dry skin (Apply to nose while using the nasal cannula to prevent nose bleeds).  Quantity: 113 g  Refills: 3            CHANGE how you take these medications        Dose / Directions   * mycophenolate 250 MG capsule  Commonly known as: GENERIC EQUIVALENT  This may have changed: how much to take  Used for: Kidney replaced by transplant      Dose: 500 mg  Take 2 capsules (500 mg) by mouth 2 times daily.  Refills: 0     * mycophenolate 250 MG capsule  Commonly known as: GENERIC EQUIVALENT  This may have changed: You were already taking a medication with the same name, and this prescription was added. Make sure you understand how and when to take each.  Used for: Kidney transplanted      Dose: 500 mg  Take 2 capsules (500 mg) by mouth 2 times daily.  Quantity: 540 capsule  Refills: 0           * This list has 2 medication(s) that are the same as other medications prescribed for you. Read the directions carefully, and ask your doctor or other care provider to review them with you.                CONTINUE these medicines which have NOT CHANGED        Dose / Directions   acetaminophen 500 MG tablet  Commonly known as: TYLENOL      Dose: 500-1,000 mg  Take 500-1,000 mg by mouth every 6 hours as needed for mild pain or fever.  Refills: 0     albuterol 108 (90 Base) MCG/ACT inhaler  Commonly known as: ProAir HFA  Used for: Wheezing      Dose: 2 puff  Inhale 2 puffs into the lungs every 6 hours as needed for shortness of breath / dyspnea or wheezing  Quantity: 1 g  Refills: 11     aspirin 81 MG EC tablet  Used for: Multiple subsegmental pulmonary emboli without  acute cor pulmonale (H)      Dose: 81 mg  Take 1 tablet (81 mg) by mouth daily  Refills: 0     budesonide 90 MCG/ACT inhaler  Commonly known as: PULMICORT FLEXHALER      Dose: 2 puff  Inhale 2 puffs into the lungs 2 times daily as needed (shortness of breath)  Refills: 0     CALCIUM CITRATE + D PO      Dose: 1 tablet  Take 1 tablet by mouth daily.  Refills: 0     clindamycin 150 MG capsule  Commonly known as: CLEOCIN      TAKE 2 CAPSULES BY MOUTH 1 HOUR PRIOR TO DENTAL APPOINTMENT. TAKE 1 CAPSULE BY MOUTH EVERY 6 HOURS AFTER APPOINTMENT  Refills: 0     Diabetic Tussin Max St  MG/5ML Liqd  Generic drug: Dextromethorphan-guaiFENesin      Dose: 10-30 mL  Take 10-30 mLs by mouth every 6 hours as needed (cough).  Refills: 0     entecavir 0.5 MG tablet  Commonly known as: BARACLUDE  Used for: Chronic hepatitis B (H), Chronic viral hepatitis B without delta agent and without coma (H)      Dose: 0.5 mg  Take 1 tablet (0.5 mg) by mouth daily.  Quantity: 90 tablet  Refills: 3     * FLUoxetine 20 MG capsule  Commonly known as: PROzac  Used for: Recurrent major depressive disorder, in partial remission      Dose: 20 mg  Take 1 capsule (20 mg) by mouth daily. With 40mg for a total daily dose of 60mg  Quantity: 90 capsule  Refills: 3     * FLUoxetine 40 MG capsule  Commonly known as: PROzac  Used for: Recurrent major depressive disorder, in partial remission      Dose: 40 mg  Take 1 capsule (40 mg) by mouth daily.  Quantity: 90 capsule  Refills: 3     fluticasone-salmeterol 250-50 MCG/ACT inhaler  Commonly known as: ADVAIR  Used for: Dyspnea on exertion, Wheezing      Dose: 1 puff  Inhale 1 puff into the lungs 2 times daily  Quantity: 180 each  Refills: 3     furosemide 20 MG tablet  Commonly known as: LASIX  Used for: Dyspnea on exertion      Dose: 20 mg  Take 1 tablet (20 mg) by mouth daily as needed (fluid retention)  Quantity: 90 tablet  Refills: 1     * hydrOXYzine HCl 10 MG tablet  Commonly known as: ATARAX  Used for:  Recurrent major depressive disorder, in partial remission      Dose: 10 mg  Take 1 tablet (10 mg) by mouth daily as needed for anxiety  Quantity: 30 tablet  Refills: 1     * hydrOXYzine HCl 25 MG tablet  Commonly known as: ATARAX  Used for: Insomnia, unspecified type, Generalized anxiety disorder      Dose: 25 mg  Take 1 tablet (25 mg) by mouth nightly as needed for anxiety (sleep)  Quantity: 90 tablet  Refills: 1     isosorbide mononitrate 60 MG 24 hr tablet  Commonly known as: IMDUR  Used for: S/P CABG (coronary artery bypass graft)      Dose: 60 mg  Take 1 tablet (60 mg) by mouth daily.  Quantity: 90 tablet  Refills: 2     latanoprost 0.005 % ophthalmic solution  Commonly known as: XALATAN  Used for: Borderline glaucoma with ocular hypertension, bilateral      Dose: 1 drop  Place 1 drop into both eyes At Bedtime  Quantity: 2.5 mL  Refills: 11     metoprolol succinate ER 25 MG 24 hr tablet  Commonly known as: TOPROL XL  Used for: CAD -- S/P CABG x 3 in 2020      Dose: 12.5 mg  Take 0.5 tablets (12.5 mg) by mouth daily.  Quantity: 45 tablet  Refills: 2     naltrexone 50 MG tablet  Commonly known as: DEPADE/REVIA  Used for: Alcohol use disorder      Dose: 50 mg  Take 1 tablet (50 mg) by mouth daily.  Quantity: 30 tablet  Refills: 2     nitroGLYcerin 0.4 MG sublingual tablet  Commonly known as: NITROSTAT  Used for: Coronary arteriosclerosis due to lipid rich plaque      Dose: 0.4 mg  Place 1 tablet (0.4 mg) under the tongue every 5 minutes as needed for chest pain  Quantity: 20 tablet  Refills: 3     OMEGA 3 PO      Dose: 1 capsule  Take 1 capsule by mouth daily Dose unknown  Refills: 0     One Daily Multivitamin/Iron Tabs      Dose: 1 tablet  Take 1 tablet by mouth daily  Refills: 0     pantoprazole 40 MG EC tablet  Commonly known as: PROTONIX  Used for: Gastroesophageal reflux disease without esophagitis      Dose: 40 mg  TAKE 1 TABLET BY MOUTH EVERY DAY  Quantity: 90 tablet  Refills: 3     polyethylene glycol 17 g  packet  Commonly known as: MIRALAX      Dose: 17 g  Take 17 g by mouth daily as needed  Refills: 0     predniSONE 5 MG tablet  Commonly known as: DELTASONE  Used for: Kidney transplanted      Dose: 5 mg  Take 1 tablet (5 mg) by mouth daily.  Quantity: 90 tablet  Refills: 3     rosuvastatin 20 MG tablet  Commonly known as: CRESTOR  Used for: Pure hypercholesterolemia      Dose: 20 mg  Take 1 tablet (20 mg) by mouth daily.  Quantity: 90 tablet  Refills: 3           * This list has 4 medication(s) that are the same as other medications prescribed for you. Read the directions carefully, and ask your doctor or other care provider to review them with you.                STOP taking      ibuprofen 200 MG tablet  Commonly known as: ADVIL/MOTRIN                  Where to get your medicines        These medications were sent to [facility] - [place], [place] - [address] [address]      Phone: [phone]   empagliflozin 10 MG Tabs tablet  mycophenolate 250 MG capsule  white petrolatum gel       Allergies   Allergies   Allergen Reactions    Penicillins Shortness Of Breath and Hives     Occurred in childhood     Keflex [Cephalexin Hcl] Unknown     Patient could not recall reaction to Keflex  Per chart, has tolerated cefazolin (2023)    Sulfa Antibiotics Rash    Tetracycline Unknown     Patient could not recall reaction, childhood reaction

## 2025-07-21 NOTE — PHARMACY-ADMISSION MEDICATION HISTORY
Admission medication history completed at [facility]. Please see Pharmacist Admission Medication History note from 07/09/2025.     Fernnado Dixon, PharmD

## 2025-07-21 NOTE — PLAN OF CARE
Goal Outcome Evaluation:    Plan of Care Reviewed With: patient    Overall Patient Progress: no change       Outcome Evaluation: Ready to discharge, waiting on TCU bed.      Shift: [id number]  Isolation Status: Droplet  VS: Stable on O2, afebrile  Neuro: A&Ox4  Respiratory: 2L via nc  Pain/Nausea: Denies pain & nausea  PRN: Robitussin   Diet: Low fat low sodium   IV Access: RPIV   GI/: Voids spontaneously, LBM 07/20  Skin: Scattered bruises  Mobility: Assist of 1  Plan: Waiting on TCU bed. Continue POC.

## 2025-07-21 NOTE — PROGRESS NOTES
Patient will be discharging to TCU this afternoon and his transportation is scheduled between 3:30 pm and 4:00 pm. Patient has intermittent cough and tylenol and robitussin was given earlier with some relief. Denies SOB when resting, no nausea, voids in urinal. Up with assist of one and walker/gait belt. Skin has scattered melanoma lesions and dusky extremities. Very pleasant.

## 2025-07-22 ENCOUNTER — APPOINTMENT (OUTPATIENT)
Dept: PHYSICAL THERAPY | Facility: SKILLED NURSING FACILITY | Age: 65
DRG: 948 | End: 2025-07-22
Attending: INTERNAL MEDICINE
Payer: COMMERCIAL

## 2025-07-22 ENCOUNTER — APPOINTMENT (OUTPATIENT)
Dept: OCCUPATIONAL THERAPY | Facility: SKILLED NURSING FACILITY | Age: 65
DRG: 948 | End: 2025-07-22
Attending: INTERNAL MEDICINE
Payer: COMMERCIAL

## 2025-07-22 LAB
HOLD SPECIMEN: NORMAL
MCV RBC AUTO: 92 FL (ref 78–100)
PLATELET # BLD AUTO: 433 10E3/UL (ref 150–450)

## 2025-07-22 PROCEDURE — 120N000009 HC R&B SNF

## 2025-07-22 PROCEDURE — 36415 COLL VENOUS BLD VENIPUNCTURE: CPT | Performed by: INTERNAL MEDICINE

## 2025-07-22 PROCEDURE — 97165 OT EVAL LOW COMPLEX 30 MIN: CPT | Mod: GO

## 2025-07-22 PROCEDURE — 97530 THERAPEUTIC ACTIVITIES: CPT | Mod: GP

## 2025-07-22 PROCEDURE — 97161 PT EVAL LOW COMPLEX 20 MIN: CPT | Mod: GP

## 2025-07-22 PROCEDURE — 99310 SBSQ NF CARE HIGH MDM 45: CPT | Performed by: PHYSICIAN ASSISTANT

## 2025-07-22 PROCEDURE — 250N000013 HC RX MED GY IP 250 OP 250 PS 637: Performed by: PHYSICIAN ASSISTANT

## 2025-07-22 PROCEDURE — 250N000012 HC RX MED GY IP 250 OP 636 PS 637: Performed by: PHYSICIAN ASSISTANT

## 2025-07-22 PROCEDURE — 250N000012 HC RX MED GY IP 250 OP 636 PS 637: Performed by: INTERNAL MEDICINE

## 2025-07-22 PROCEDURE — 250N000009 HC RX 250: Performed by: INTERNAL MEDICINE

## 2025-07-22 PROCEDURE — 250N000011 HC RX IP 250 OP 636: Performed by: INTERNAL MEDICINE

## 2025-07-22 PROCEDURE — 85049 AUTOMATED PLATELET COUNT: CPT | Performed by: INTERNAL MEDICINE

## 2025-07-22 PROCEDURE — 97535 SELF CARE MNGMENT TRAINING: CPT | Mod: GO

## 2025-07-22 PROCEDURE — 250N000013 HC RX MED GY IP 250 OP 250 PS 637: Performed by: INTERNAL MEDICINE

## 2025-07-22 RX ORDER — MYCOPHENOLATE MOFETIL 250 MG/1
750 CAPSULE ORAL
Status: DISCONTINUED | OUTPATIENT
Start: 2025-07-29 | End: 2025-07-30 | Stop reason: HOSPADM

## 2025-07-22 RX ADMIN — Medication 1 TABLET: at 12:19

## 2025-07-22 RX ADMIN — ASPIRIN 81 MG: 81 TABLET, DELAYED RELEASE ORAL at 09:55

## 2025-07-22 RX ADMIN — MYCOPHENOLATE MOFETIL 500 MG: 250 CAPSULE ORAL at 09:53

## 2025-07-22 RX ADMIN — ENOXAPARIN SODIUM 40 MG: 40 INJECTION SUBCUTANEOUS at 09:54

## 2025-07-22 RX ADMIN — ACETAMINOPHEN 650 MG: 325 TABLET, FILM COATED ORAL at 22:21

## 2025-07-22 RX ADMIN — GUAIFENESIN AND DEXTROMETHORPHAN 10 ML: 100; 10 SYRUP ORAL at 15:27

## 2025-07-22 RX ADMIN — ISOSORBIDE MONONITRATE 60 MG: 60 TABLET, EXTENDED RELEASE ORAL at 09:55

## 2025-07-22 RX ADMIN — ROSUVASTATIN CALCIUM 20 MG: 10 TABLET, FILM COATED ORAL at 09:55

## 2025-07-22 RX ADMIN — ACETAMINOPHEN 650 MG: 325 TABLET, FILM COATED ORAL at 12:24

## 2025-07-22 RX ADMIN — EMPAGLIFLOZIN 10 MG: 10 TABLET, FILM COATED ORAL at 09:55

## 2025-07-22 RX ADMIN — PREDNISONE 5 MG: 5 TABLET ORAL at 09:55

## 2025-07-22 RX ADMIN — GUAIFENESIN AND DEXTROMETHORPHAN 10 ML: 100; 10 SYRUP ORAL at 22:21

## 2025-07-22 RX ADMIN — FLUTICASONE FUROATE AND VILANTEROL TRIFENATATE 1 PUFF: 100; 25 POWDER RESPIRATORY (INHALATION) at 09:56

## 2025-07-22 RX ADMIN — LATANOPROST 1 DROP: 50 SOLUTION/ DROPS OPHTHALMIC at 20:58

## 2025-07-22 RX ADMIN — Medication 12.5 MG: at 09:55

## 2025-07-22 RX ADMIN — ENTECAVIR 0.5 MG: 0.5 TABLET ORAL at 09:53

## 2025-07-22 RX ADMIN — MYCOPHENOLATE MOFETIL 500 MG: 250 CAPSULE ORAL at 20:58

## 2025-07-22 RX ADMIN — PANTOPRAZOLE SODIUM 40 MG: 40 TABLET, DELAYED RELEASE ORAL at 09:55

## 2025-07-22 RX ADMIN — TUBERCULIN PURIFIED PROTEIN DERIVATIVE 5 UNITS: 5 INJECTION, SOLUTION INTRADERMAL at 10:03

## 2025-07-22 RX ADMIN — Medication 5 MG: at 22:21

## 2025-07-22 RX ADMIN — GUAIFENESIN AND DEXTROMETHORPHAN 10 ML: 100; 10 SYRUP ORAL at 06:17

## 2025-07-22 RX ADMIN — NALTREXONE HYDROCHLORIDE 50 MG: 50 TABLET, FILM COATED ORAL at 09:55

## 2025-07-22 RX ADMIN — FLUOXETINE HYDROCHLORIDE 60 MG: 40 CAPSULE ORAL at 09:54

## 2025-07-22 ASSESSMENT — ACTIVITIES OF DAILY LIVING (ADL)
ADLS_ACUITY_SCORE: 49
ADLS_ACUITY_SCORE: 44
ADLS_ACUITY_SCORE: 49

## 2025-07-22 NOTE — PROGRESS NOTES
"   07/22/25 1007   Appointment Info   Signing Clinician's Name / Credentials (PT) Pam Justice, PT, DPT   Rehab Comments (PT) -10 min getting a WC for pt   Quick Adds   Quick Adds Certification   Living Environment   People in Home other (see comments)  (owns house, has a tenant that rents a room)   Current Living Arrangements house   Home Accessibility stairs to enter home;stairs within home   Number of Stairs, Main Entrance 3   Stair Railings, Main Entrance railing on left side (ascending);railing on right side (ascending)  (B rails but too far apart to hold both at the same time)   Number of Stairs, Within Home, Primary greater than 10 stairs  (12 stairs to upper level bedroom and only bathroom)   Stair Railings, Within Home, Primary railing on left side (ascending)   Self-Care   Usual Activity Tolerance moderate   Current Activity Tolerance fair   Equipment Currently Used at Home none  (has FWW and shower chair available)   Fall history within last six months no   Activity/Exercise/Self-Care Comment Patient is independent with mobility without AD at baseline. He does have access to \"a couple\" FWW at home and a shower chair (does not use shower chair at baseline). He reports he is independent with ADLs/IADLs except driving and he sometimes has a cleaning service come but  usually cleans on his own also. Pt reports he has a license but does not drive currently, uses friends or [employer] or [employer] for transport.   General Information   Onset of Illness/Injury or Date of Surgery 07/09/25  (hospital admission started 7/9, transitioned to TCU on 7/21/25)   Referring Physician Kalen Snowden MD   Patient/Family Therapy Goals Statement (PT) to be able to mobilize around his house (including stairs, etc) and have endurance to do so throughout the day   Pertinent History of Current Problem (include personal factors and/or comorbidities that impact the POC) \"Jerad Ross is a 65 year old man with a history " "of NSTEMI (2014), FSGS s/p DDKT (1978, 1993), HTN, chronic anemia, PE, chronic HBV, COPD, who was admitted to [facility] 7/9 - 7/21/25 with acute hypoxic respiratory failure felt to be secondary to rhinovirus and newly diagnosed HFpEF with acute decompensation. Transferred to  TCU on 7/21/25 for physical rehabilitation.      \"   Existing Precautions/Restrictions oxygen therapy device and L/min;fall  (2L O2 currently vias NC (SpO2 96 at rest), pt does not use O2 at baseline)   Cognition   Orientation Status (Cognition) oriented x 4   Pain Assessment   Patient Currently in Pain No  (denies pain at rest \"not really, no\")   Posture    Posture Forward head position;Protracted shoulders  (somewhat flexed posture with walker)   Range of Motion (ROM)   Range of Motion ROM is WFL   ROM Comment Patient able to achieve figure 4 position bilaterally seated EOB to don his shoes.   Strength (Manual Muscle Testing)   Strength Comments Some general deconditioning noted, pt relies on UE pushoff for sit>stand but able to complete without physical assist from PT.   Bed Mobility   Comment, (Bed Mobility) Supine<>sit from flat bed without rails, IND, uses momentum to propel supine>sit   Transfers   Comment, (Transfers) Sit<>stand from high back chair (17.5\" seat height) with definite reliance of UE support on armrests of chair for pushoff/reach back, with CGA from PT   Gait/Stairs (Locomotion)   Comment, (Gait/Stairs) see GG below   Balance   Balance Comments Able to stand statically without UE support with S. Benefits from UE support on walker for stability with dynamic mobility/ambulation.   Sensory Examination   Sensory Perception Comments denies numbness/tingling   Clinical Impression   Criteria for Skilled Therapeutic Intervention Yes, treatment indicated   PT Diagnosis (PT) reduced functional mobility and independence   Influenced by the following impairments reduced activity tolerance/endurance, new O2 needs, deconditioning, " generalized weakness, reduced balance   Functional limitations due to impairments decreased independence with transfers, ambulation, stairs; decreased endurance for daily tasks   Clinical Presentation (PT Evaluation Complexity) stable   Clinical Presentation Rationale clinical judgement   Clinical Decision Making (Complexity) low complexity   Planned Therapy Interventions (PT) balance training;bed mobility training;gait training;ROM (range of motion);stair training;strengthening;stretching;transfer training;home exercise program;neuromuscular re-education;patient/family education;progressive activity/exercise;home program guidelines   Risk & Benefits of therapy have been explained evaluation/treatment results reviewed;care plan/treatment goals reviewed;risks/benefits reviewed;current/potential barriers reviewed;participants voiced agreement with care plan;participants included;patient   PT Total Evaluation Time   PT Eval, Low Complexity Minutes (36717) 25   Therapy Certification   Start of care date 07/22/25   Certification date from 07/22/25   Certification date to 08/20/25   Medical Diagnosis deconditioning, Acute hypoxic respiratory failure (H)   Physical Therapy Goals   PT Frequency 5x/week   PT Predicted Duration/Target Date for Goal Attainment 07/29/25   PT Goals Transfers;Gait;Stairs   PT: Transfers Modified independent;Sit to/from stand;Bed to/from chair;Assistive device  (with or without AD to optimize safety and independence)   PT: Gait Modified independent;Assistive device;Greater than 200 feet  (with or without AD to optimize safety and independence)   PT: Stairs Greater than 10 stairs;Rail on left;Modified independent  (12 stairs L rail)   Interventions   Interventions Quick Adds Therapeutic Activity   Therapeutic Activity   Therapeutic Activities: dynamic activities to improve functional performance Minutes (84145) 25   Symptoms Noted During/After Treatment Fatigue   Treatment Detail/Skilled  "Intervention See GG below including additional ambulation bout beyond eval, stairs for endurance/strength, education about car transfer safety. Pt had 2L O2 on throughout. SpO2 dipped to 92 seated after stairs bout, otherwise was in mid 90s with spot checks.   PT Discharge Planning   PT Plan GG: roll L & R, WC mobility?, curb step; stairs daily for endurance, monitor O2, trial gait without AD   Physical Therapy Time and Intention   Timed Code Treatment Minutes 25   Total Session Time (sum of timed and untimed services) 50   Post Acute Settings Only   What unit is patient on? TCU   PT - Transitional Care Unit Time   Individual Time (minutes) - PT 50   Group Time (minutes) - PT 0   Concurrent Time (minutes) - PT 0   Co-Treatment Time (minutes) - PT 0   TCU Total Session Time (minutes) - PT 50   TCU Daily Total Session Time   PT TCU Daily Total Session Time 50   Rehab TCU Daily Total Session Time 50   Bed Mobility: Sit to lying   Describe Performance IND   Bed Mobility: Lying to sitting on the side of bed   Describe Performance IND, uses ,momentum to propel supine>sitting   Transfers: Sit to Stand   Describe Performance CGA with walker, relies on UE pushoff, performed from \"standard\" high back chair 17.5\" seat height; pt also performed sit<>stand from EOB and from WC during session with same level of assist.   Transfers: Chair/Bed transfers   Describe Performance CGA with walker   Walk 10 Feet - Ability to walk once standing   Describe Performance SBA with walker, 2L O2 via NC   Walk 10 Feet on uneven surfaces - Ability to walk on various surfaces.   Describe Performance SBA with walker, crossing from hard flat floor to low pile carpet over door threshold   Walk 50 Feet with Two Turns - Ability to walk at least 50 feet.   Describe Performance SBA with FWW + 2L O2 managed by PT + WC brought with to have available for sitting rest if needed/for safety due to pt reporting \"a little\" lightheadedness   Walk 150 Feet - " "Ability to walk 150 feet   Describe Performance Ambulated 130' with assist as described above, pt requested sitting rest at ~130 ft due to fatigue & utilized WC for sitting rest break; pt did not complete 150 ft consecutively; later in session pt ambulated an additional ~130 ft with FWW and SBA + WC brought with in case rest break needed but did not need to utilize WC this bout.   4 Steps - Ability to go up/down steps.   Describe Performance Performed up/down 3x4 6\" steps with B rails and Josué, with 2L O2 throughout   12 Steps - Ability to go up/down steps.   Describe Performance Performed up/down 3x4 6\" steps with B rails and Josué, with 2L O2 throughout   Car Transfer - Ability to transfer in/out of car or van.   Describe Performance CGA, with FWW & pt self assisting with UEs to lift his LEs into \"car\"; pt reports he has looked into various tools for assisting with car transfer such as handle & swivel for seat. Edu pt that depending how he does these may or may not be necessary, noting he did well overall with this today. Discussed different techniques for car transfer, edu pt sitting then turning & lifting LEs into car is more safe than stepping in laterally & pt receptive to edu.   Picking up Object - Ability to bend/stoop while standing.   Describe Performance Holding walker with 1 UE, pt able to  object (cone on it's side) with SBA from PT     "

## 2025-07-22 NOTE — PROGRESS NOTES
07/22/25 0900   Name of Certified Therapeutic Rec Specialist   Name of Certified Therapeutic Rec Specialist DIAMOND Morley   Appointment Type   Type of Therapeutic Rec Session Therapeutic Rec Assessment   General Information   Patient Profile Review See Profile for full history and prior level of function   Daily Contact with Relatives or Friends Phone call;Visit   Music   Listens to Recorded Music Yes   Brought Own Equipment Yes   Media   Computer Will use tablet/phone   TV / Movies TV   Impression   Open to Socializing with Others Independent;Reluctant   Treatment Plan   Type of Intervention Independent with activity   Assessment Assessment completed, pt oriented to leisure materials available, pt requested aromatherapy, getting approval from /PA for pt to use them given his respiratory status. No other needs at this time.

## 2025-07-22 NOTE — PLAN OF CARE
MDS Pain Assessment    The following is the pain interview as conducted by the TCU RN caring for the patient on July 22, 2025. This assessment is required by the [facility] for all patients in [place] SNF (Skilled Nursing Facilities).     . Pain Presence  Have you had pain or hurting at any time in the last 5 days?   1. Yes    . Pain Frequency  How much of the time have you experienced pain or hurting over the last 5 days?   2. Occasionally    . Pain Effect on Sleep  Over the past 5 days, how much of the time has pain made it hard for you to sleep at night?   1. Rarely or not at all    . Pain Interference with Therapy Activities  Over the past 5 days, how often have you limited your participation in rehabilitation therapy sessions due to pain?   1. Rarely or not at all    . Pain Interference with Day-to-Day Activities  Over the past 5 days, have you limited your day-to-day activities (excluding rehabilitation therapy sessions) because of pain?  1. Rarely or not at all    . Pain intensity   Numeric Rating Scale (00-10): Please rate your worst pain over the last 5 days on a zero to ten scale, with zero being no pain and ten as the worst pain you can imagine. 03

## 2025-07-22 NOTE — PHARMACY-MEDICATION REGIMEN REVIEW
Pharmacy Medication Regimen Review  Jerad Ross is a 65 year old male who is currently in the Transitional Care Unit.    Assessment: All medications have an appropriate indications, durations and no unnecessary use was found.  Oral thrush was resolved. Nystatin was stopped upon discharge from the acute hospital.   PTA mycophenolate 750 mg po BID is ordered starting 7/29/25. Current dose is 500 mg po bid.  Plan:   Continue current treatment as ordered.     Attending provider will be sent this note for review.  If there are any emergent issues noted above, pharmacist will contact provider directly by phone.      Pharmacy will periodically review the resident's medication regimen for any PRN medications not administered in > 72 hours and discontinue them. The pharmacist will discuss gradual dose reductions of psychopharmacologic medications with interdisciplinary team on a regular basis.    Please contact pharmacy if the above does not answer specific medication questions/concerns.    Background:  A pharmacist has reviewed all medications and pertinent medical history today.  Medications were reviewed for appropriate use and any irregularities found are listed with recommendations.      Current Facility-Administered Medications:     [START ON 7/24/2025] - Skin Test Reading -, , Does not apply, Q21 Days, Kalen Snowden MD    acetaminophen (TYLENOL) Suppository 650 mg, 650 mg, Rectal, Q4H PRN, Kalen Snowden MD    acetaminophen (TYLENOL) tablet 650 mg, 650 mg, Oral, Q4H PRN, Kalen Snowden MD    albuterol (PROVENTIL HFA/VENTOLIN HFA) inhaler, 2 puff, Inhalation, Q6H PRN, Kalen Snowden MD    aspirin EC tablet 81 mg, 81 mg, Oral, Daily, Kalen Snowden MD, 81 mg at 07/22/25 0955    benzocaine-menthol (CHLORASEPTIC MAX) 15-10 MG lozenge 1 lozenge, 1 lozenge, Buccal, Q2H PRN, Kalen Snowden MD    calcium citrate-vitamin D (CITRACAL) 315-6.25 MG-MCG per  tablet 1 tablet, 1 tablet, Oral, Daily with lunch, Kalen Snowden MD    empagliflozin (JARDIANCE) tablet 10 mg, 10 mg, Oral, Daily, Kalen Snowden MD, 10 mg at 07/22/25 0955    enoxaparin ANTICOAGULANT (LOVENOX) injection 40 mg, 40 mg, Subcutaneous, Q24H, Kalen Snowden MD, 40 mg at 07/22/25 0954    entecavir (BARACLUDE) tablet 0.5 mg, 0.5 mg, Oral, Daily, Kalen Snowden MD, 0.5 mg at 07/22/25 0953    FLUoxetine (PROzac) capsule 60 mg, 60 mg, Oral, Daily, Kalen Snowden MD, 60 mg at 07/22/25 0954    fluticasone-vilanterol (BREO ELLIPTA) 100-25 MCG/ACT inhaler 1 puff, 1 puff, Inhalation, Daily, Kalen Snowden MD, 1 puff at 07/22/25 0956    guaiFENesin-dextromethorphan (ROBITUSSIN DM) 100-10 MG/5ML syrup 10 mL, 10 mL, Oral, Q6H PRN, Kalen Snowden MD, 10 mL at 07/22/25 0617    hydrOXYzine HCl (ATARAX) tablet 25 mg, 25 mg, Oral, At Bedtime PRN, Kalen Snowden MD    isosorbide mononitrate (IMDUR) 24 hr tablet 60 mg, 60 mg, Oral, Daily, Kalen Snowden MD, 60 mg at 07/22/25 0955    latanoprost (XALATAN) 0.005 % ophthalmic solution 1 drop, 1 drop, Both Eyes, At Bedtime, Kalen Snowden MD, 1 drop at 07/21/25 2109    melatonin tablet 5 mg, 5 mg, Oral, At Bedtime PRN, Linette Joshua PA-C    metoprolol succinate ER (TOPROL-XL) 24 hr half-tab 12.5 mg, 12.5 mg, Oral, Daily, Kalen Snowden MD, 12.5 mg at 07/22/25 0955    multivitamin w/minerals (THERA-VIT-M) tablet 1 tablet, 1 tablet, Oral, Daily with lunch, Kalen Snowden MD    mycophenolate (GENERIC EQUIVALENT) capsule 500 mg, 500 mg, Oral, BID, Linette Joshua PA-C, 500 mg at 07/22/25 0953    [START ON 7/29/2025] mycophenolate (GENERIC EQUIVALENT) capsule 750 mg, 750 mg, Oral, BID IS, Linette Joshua PA-C    naltrexone (DEPADE/REVIA) tablet 50 mg, 50 mg, Oral, Daily, Kalen Snowden MD, 50 mg at 07/22/25  0955    nitroGLYcerin (NITROSTAT) sublingual tablet 0.4 mg, 0.4 mg, Sublingual, Q5 Min PRN, Kalen Snowden MD    Nurse may request from Pharmacy a change of form of medication (e.g. Liquid to tablet)., , Does not apply, Continuous PRN, Kalen Snowden MD    pantoprazole (PROTONIX) EC tablet 40 mg, 40 mg, Oral, Daily, Kalen Snowden MD, 40 mg at 07/22/25 0955    polyethylene glycol (MIRALAX) Packet 17 g, 17 g, Oral, Daily PRN, Kalen Snowden MD    predniSONE (DELTASONE) tablet 5 mg, 5 mg, Oral, Daily, Kalen Snowden MD, 5 mg at 07/22/25 0955    rosuvastatin (CRESTOR) tablet 20 mg, 20 mg, Oral, Daily, Kalen Snowden MD, 20 mg at 07/22/25 0955    sennosides (SENOKOT) tablet 1-2 tablet, 1-2 tablet, Oral, BID PRN, Kalen Snowden MD    sodium chloride (OCEAN) 0.65 % nasal spray 1 spray, 1 spray, Both Nostrils, Q1H PRN, Linette Joshua PA-C    tuberculin injection 5 Units, 5 Units, Intradermal, Q21 Days, Kalen Snowden MD, 5 Units at 07/22/25 1003    white petrolatum GEL, , Topical, Daily PRN, Kalen Snowden MD  No current outpatient prescriptions on file.   PMH: Acute hypoxic respiratory failure, Rhinovirus infection, COPD, HFpEF with acute decompensation, CAD s/p CABG, NSTEMI (2014), FSGS s/p renal transplant x2 (1978, 1993), Chronic HBV, Depression, Anxiety, Insomnia, GERD, Alcohol use disorder in remission, Glaucoma, and h/o provoked PE

## 2025-07-22 NOTE — CONSULTS
Social Work: Initial Assessment with Discharge Plan    Patient Name: Jerad Ross  : 1960  Age: 65 year old  MRN: [id number]  Completed assessment with: chart review and pt interview.   Admitted to TCU: 25  Clothes here: yes  Shoes here: Yes     Presenting Information   Date of SW assessment: 2025  Health Care Directive: Provided education  Primary Health Care Agent: Self  Secondary Health Care Agent: LUIS  Living Situation: lives in a house with a roommate. Pt lives in a three level home and will need to independently manage at least 7+ stairs to access bedroom and bathroom.   Previous Functional Status: Dependent ADLs:: Independent  Dependent IADLs:: Independent  DME available: walker, rolling, shower chair   Patient and family understanding of hospitalization: appropriate and pleasant  Cultural/Language/Spiritual Considerations: Pt is a 65 y.o.   male, English speaking Spiritual rasheed.   Abuse concerns: Pt denied.   -------------------------------------------------------------------------------------------------------------  TRANSPORTATION:    Has lack of transportation kept you from medical appointments, meetings, work, or from getting things needed for daily living?  A. Yes, it has kept me from medical appointments or from getting my medications  B. Yes, it has kept me from non-medical meetings, appointments, work or from getting things that I need  C. No  X. Patient Unable to respond  Y. Patient declines to respond  -------------------------------------------------------------------------------------------------------------  Health Literacy:   How Often do you need to have someone help you when you read instructions, pamphlets, or other written material from your doctor or pharmacy?  0.       Never  1.       Rarely  2.       Sometimes  3.       Often  4.       Always  5.       Patient declines to respond  6.       Patient unable to  respond  ------------------------------------------------------------------------------------------------------------   BIMS:  See flow sheet     ----------------------------------------------------------------------------------------------------  CAM:  See flow sheet.   ----------------------------------------------------------------------------------------------------  PHQ-9:  See flow sheet.     -------------------------------------------------------------------------------------------------------------  SOCIAL ISOLATION  How often do you feel lonely or isolated from those around you?  0.       Never  1.       Rarely  2.       Sometimes  3.       Often  4.       Always  5.       Patient declines to respond  6.       Patient unable to respond  -------------------------------------------------------------------------------------------------------------    BIMS: Pt scored 15 on BIMS indicating cognition intact.   PHQ-9: Pt scored 4 on PHQ-9 indicating mild depressive symptos present.   PAS: confirmation number- PAS - [id number]   Has there been a level II screen?  No  Were there any recommendations in the screen? No  If yes, will the recommendations we incorporated into the Plan of Care?  N/A  Physical Health  Reason for admission: Jerad Ross was admitted on 7/9/2025 for acute hypoxic respiratory failure.  The following problems were addressed during his hospitalization.     Provider Information   Primary Care Physician:Aston Perry   [address] [place] [id number]   [phone]  : Pt denied.    Mental Health:   Diagnosis: Pt reported stable MH.   Current Support/Services: medications, Therapy  Previous Services: psychiatrist   Services Needed/Recommended: Pt is open to Linda and (has own Health Psychology services) while at [facility].     Substance Use:  Diagnosis: Pt denied using all substances.   Current Support/Services: None reported  Previous Services: Pt denied.  Services  Needed/Recommended: N/A    Support System  Marital Status:  going through divorce  Family support: brother lives in [place].   Sister lives [place].    Nieces and nephews live out of state.  Other support available: roommate  Gaps in support system: Pt denied.    Personalized Care and Trauma  What other information would help us give you more personalized care or how can we help you with any past trauma? Medical trauma.  Pt would like to be told what you are doing when you are doing it and give him a minute to process what you said. IE  if give injection.  Tell him what it is and give him a minute to be ready to receive injection.     MyChart:  Do you have access to [employer] to view my Baseline Care Plan? Yes      Community Resources  Current in home services: None reported.  Previous services: Pt had HH a long time ago.      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   Income Source: S.S.   Education: college Masters degree  Financial Concerns:  Money is tight.  Pt wants to move and sell house.   Insurance: [facility] MEDICARE     Discharge Plan   Patient and family discharge goal: Home   Provided Education on discharge plan: YES  Patient agreeable to discharge plan:  YES  A list of Medicare Certified Facilities was provided to the patient and/or family to encourage patient choice. Based on location and rating, patient would like referrals made to: N/A  General information regarding anticipated insurance coverage and possible out of pocket cost was discussed. Patient and patient's family are aware patient may incur the cost of transportation to the facility, pending insurance payment: YES  Barriers to discharge: TBD    Discharge Recommendations   Disposition: Home   Transportation Needs: Friend or [employer]   Name of Transportation Company and Phone: [employer]    Additional comments   Pt received a kidney transplant when he was 18 years old.     Pt asked for new  clock. Clock in room was not working.  Sw got another but it was not working too well. Nursing Supervisor has ordered more.  Pt asked for housing list.  SW gave pt a Sr. Blue book  It's getting old but there is a website on the book.  SW encourage him to look it up after looking at book.     ESTEE Euceda   [facility], Transitional Care Unit   Social Work   [facility] [address] [place] [address], [address]th [place] [address] (ph) [phone]

## 2025-07-22 NOTE — PROGRESS NOTES
[facility]  Internal Medicine Extended Progress Note    Date of Admission: 7/21/2025  Hospital Discharge Team: Internal Medicine         Assessment and Plan:   Jerad Ross is a 65 year old man with a history of NSTEMI (2014), FSGS s/p DDKT (1978, 1993), HTN, chronic anemia, PE, chronic HBV, COPD, who was admitted to [facility] 7/9 - 7/21/25 with acute hypoxic respiratory failure felt to be secondary to rhinovirus and newly diagnosed HFpEF with acute decompensation. Transferred to  TCU on 7/21/25 for physical rehabilitation.     Physical deconditioning: PT, OT consults.     Acute hypoxic respiratory failure  Rhinovirus infection  COPD  Presented with dyspnea and hypoxia initially requiring BiPAP. CT chest revealed multifocal GGOs, negative for PE. RVP positive for rhinovirus infection. HFpEF exacerbation also contributing to AHRF, was diursed with IV Lasix. TTE also revealed new LV diastolic dysfunction and he was diuresed with IV Lasix. O2 needs improved to 2 LPM.    - Breo Ellipta once daily   - Continue droplet precautions until symptoms resolved   - Continue O2 to maintain SpO2 >92%, wean as able   - Consider repeat chest CT as outpatient    HFpEF with acute decompensation  CAD s/p CABG  NSTEMI (2014)  New diagnosis. TTE 7/14  revealed grade I LV diastolic dysfunction, EF 55-65%. CXR with pulmonary edema. Diuresed with IV Lasix. EDW ~152 lbs. Currently appears euvolemic.    - Continue Jardiance (started inpatient)   - Metoprolol 12.5 mg daily   - Imdur 60 mg daily   - ASA 81 daily, PTA statin   - Daily weights   - Needs Cardiology follow-up, referral placed    FSGS s/p renal transplant x2 (1978, 1993): Renal function stable. No acute issues.   - Prednisone 5 mg daily   - mycophenolate 500 mg daily. Reviewed with Nephrology today, plan to increase back to  mg BID in 1 week.    - Monitor BMP weekly    Other Stable Medical Issues:  Chronic HBV: Continue PTA entecavir.   Depression, anxiety:  Mode of arrival (squad #, walk in, police, etc) :  and mother brought in           Chief complaint(s): Suicidal        Arrival Note (brief scenario, treatment PTA, etc). : suicidal ideations to overdose or drink self to death      C= \"Have you ever felt that you should Cut down on your drinking? \"  yes  A= \"Have people Annoyed you by criticizing your drinking? \"  yes  G= \"Have you ever felt bad or Guilty about your drinking? \"  yes  E= \"Have you ever had a drink as an Eye-opener first thing in the morning to steady your nerves or to help a hangover? \"  yes     Deferred []       Reason for deferring: N/A     *If yes to two or more: probable alcohol abuse. * Continue fluoxetine, PRN hydroxyzine.   Insomnia: Melatonin 5 mg at bedtime PRN.   GERD: Continue PTA PPI.   Glaucoma: Continue PTA latanoprost drops.   Alcohol use disorder, in remission: Continue PTA naltrexone.   Hx of PE: Provoked after hip surgery in 8/2023.       CODE: DNR/DNI  Diet: Low sodium   DVT: Lovenox  Indication for Psychotropic Medications: fluoxetine, hydroxyzine for MDD and KIM  Pneumococcal Vaccination Status: Received PCV23 in 2018, due for PCV20  Disposition: TBD pending PT/OT dates    Linette Joshua PA-C  Hospitalist Service             Chief Complaint:   Weakness         HPI:   Jerad Ross is a 65 year old man with a history of NSTEMI (2014), FSGS s/p DDKT (1978, 1993), HTN, chronic anemia, PE, chronic HBV, COPD, who was admitted to [facility] 7/9 - 7/21/25 with acute hypoxic respiratory failure felt to be secondary to rhinovirus and newly diagnosed HFpEF with acute decompensation. Transferred to  TCU on 7/21/25 for physical rehabilitation.     Currently, patient reports doing well overall. Has ongoing dry cough and nasal congestion. Nasal passages feel dry and he is having small intermittent bouts of epistaxis. Dyspnea improved overall. No fevers.            Review of Systems:   A 10 point ROS was performed and negative unless otherwise noted in HPI.          Past Medical History:   I have reviewed this patient's medical history and updated it with pertinent information if needed.   Past Medical History:   Diagnosis Date    Acute midline low back pain without sciatica     AION (acute ischemic optic neuropathy)     Anemia in chronic renal disease     Anxiety     Arthritis 1978    Post transplant    Avascular necrosis of bones of both hips (H)     s/p bilateral hip replacements    Avascular necrosis of bones of both hips (H)     s/p bilateral hip replacements    Basal cell carcinoma     Cataract     Chronic hepatitis B (H)     Chronic osteoarthritis 1978    Post transplant    Clostridium  difficile colitis     COPD (chronic obstructive pulmonary disease) (H)     Coronary artery disease involving native coronary artery of native heart without angina pectoris 06/17/2014    Coronary angiogram 6/17/14: Severe distal 3-vessel disease involving small vessels, not amenable to PCI or CABG.    CRP elevated     Depression     Depressive disorder 1978    Post transplant    Dialysis patient     Diverticulosis     Dyslipidemia     Elevated C-reactive protein (CRP)     FSGS (focal segmental glomerulosclerosis)     FSGS (focal segmental glomerulosclerosis)     Gastric ulcer with hemorrhage 02/12/2012    Gastric ulcer with hemorrhage 02/12/2012    GERD (gastroesophageal reflux disease)     Glaucoma     OHTN    Glaucoma     Hemorrhoids     History of blood transfusion     HTN (hypertension)     Hyperlipidemia 1978    Hypertension secondary to other renal disorders     Hypogonadism in male     Immunosuppressed status     Immunosuppression     Kidney replaced by transplant     focal glomerulosclerosis     Kidney transplanted     focal glomerulosclerosis     NSTEMI (non-ST elevated myocardial infarction) (H) 06/17/2014    NSTEMI (non-ST elevated myocardial infarction) (H) 06/17/2014    JULIANE (obstructive sleep apnea)     Doesn't use CPAP    Paracentral scotoma     LE    Renal failure     Secondary renal hyperparathyroidism     Septic bursitis     Squamous cell carcinoma     Uncomplicated asthma 2003    Well controled             Past Surgical History:   I have reviewed this patient's surgical history and updated it with pertinent information if needed.  Past Surgical History:   Procedure Laterality Date    APPENDECTOMY      ARTHROPLASTY REVISION HIP Right 06/19/2023    Procedure: RIGHT HIP REVISION;  Surgeon: Juan Mcdonough MD;  Location: [facility] OR    BIOPSY      Cardiac Bypass surgery  06/08/2020    [facility]     CATARACT EXTRACTION EXTRACAPSULAR W/ INTRAOCULAR LENS IMPLANTATION Bilateral 4-20-10, 6-1-10    CATARACT IOL,  RT/LT  04/19/2000    RE    CATARACT IOL, RT/LT  06/01/2000    LE    COLECTOMY PARTIAL  01/01/1983     10 cm, diverticulitis     COLECTOMY SUBTOTAL  1983    10 cm, diverticulitis    COLONOSCOPY  02/13/2012    Procedure:COLONOSCOPY; Surgeon:SLOAN GALLARDO; Location:[facility]    COLONOSCOPY N/A 01/22/2020    Procedure: Colonoscopy, With Polypectomy And Biopsy;  Surgeon: Aston Kiran MD;  Location: [facility]    COLONOSCOPY N/A 06/05/2025    Procedure: Colonoscopy;  Surgeon: Lina Claros MD;  Location: [facility]    CV CORONARY ANGIOGRAM N/A 06/02/2020    Procedure: Coronary Angiogram;  Surgeon: Alona Florentino MD;  Location: [facility] Cath Lab;  Service: Cardiology    CV CORONARY ANGIOGRAM N/A 09/20/2021    Procedure: Coronary Angiogram;  Surgeon: Adam Agudelo MD;  Location: [facility] CV    CV LEFT HEART CATHETERIZATION WITHOUT LEFT VENTRICULOGRAM Left 06/02/2020    Procedure: Left Heart Catheterization Without Left Ventriculogram;  Surgeon: Alona Florentino MD;  Location: [facility] Cath Lab;  Service: Cardiology    CV SUPRAVALVULAR AORTOGRAM N/A 09/20/2021    Procedure: Supravalvular Aortagram;  Surgeon: Adam Agudelo MD;  Location: [facility] CV    ESOPHAGOSCOPY, GASTROSCOPY, DUODENOSCOPY (EGD), COMBINED  02/13/2012    Procedure:COMBINED ESOPHAGOSCOPY, GASTROSCOPY, DUODENOSCOPY (EGD); Surgeon:SLOAN GALLARDO; Location: GI    ESOPHAGOSCOPY, GASTROSCOPY, DUODENOSCOPY (EGD), COMBINED  02/13/2012    EXTRACAPSULAR CATARACT EXTRATION WITH INTRAOCULAR LENS IMPLANT  4-20-10, 6-1-10    Rt, Lt    HERNIA REPAIR  1995    Lt inguinal    HERNIA REPAIR  1995    Lt inguinal    HIP SURGERY      1981, bilat MITZI, revised 2001, 2005    HIP SURGERY  01/01/1981    bilat MITZI, revised 2001, 2005    IR FINE NEEDLE ASPIRATION W ULTRASOUND  04/10/2023    JOINT REPLACEMENT      KIDNEY SURGERY  1978 and 1993    transplant    KNEE SURGERY  1983, 1987, 2001    bilat TKA    MOHS  MICROGRAPHIC PROCEDURE      MOHS MICROGRAPHIC PROCEDURE      ORTHOPEDIC SURGERY      OTHER SURGICAL HISTORY      kidney [id number], 1993    PHACOEMULSIFICATION CLEAR CORNEA WITH STANDARD INTRAOCULAR LENS IMPLANT Right 2000    PHACOEMULSIFICATION CLEAR CORNEA WITH STANDARD INTRAOCULAR LENS IMPLANT Left 2000    LA BOWEL TO BOWEL ANASTOMOSIS      LA CARDIAC SURG PROCEDURE UNLISTED      3x bypass    LA PELVIS/HIP JOINT SURGERY UNLISTED  , , ,     Both hips, L 1x rev. R 2x rev.    LA STOMACH SURGERY PROCEDURE UNLISTED      Splenectomy    SPLENECTOMY      leukopenia, auxiliary spleen    TONSILLECTOMY      TRANSPLANT      VASCULAR SURGERY               Social History:     Social History     Tobacco Use    Smoking status: Former     Current packs/day: 0.00     Average packs/day: 1 pack/day for 9.0 years (9.0 ttl pk-yrs)     Types: Cigarettes     Start date: 1980     Quit date: 1988     Years since quittin.5     Passive exposure: Never    Smokeless tobacco: Former   Vaping Use    Vaping status: Never Used   Substance Use Topics    Alcohol use: Not Currently     Comment: quit drinking 2023    Drug use: Not Currently     Types: Oxycodone            Family History:   I have reviewed this patient's family history and updated it with pertinent information if needed.   Family History   Problem Relation Age of Onset    Asthma Mother     Arthritis Mother     Cardiovascular Father         AI with valve repair    Hypertension Father     Kidney Disease Father     Other - See Comments Father         AI with valve repair    Hyperlipidemia Father     Heart Disease Father     Anxiety Disorder Maternal Grandmother     Depression Maternal Grandmother     Breast Cancer Maternal Grandmother     Substance Abuse Maternal Grandfather     Cerebrovascular Disease Maternal Grandfather     Other - See Comments Maternal Grandfather         cerebrovascular disease    Depression Maternal  Grandfather     Dementia Maternal Grandfather     Heart Disease Maternal Grandfather     Cancer Paternal Grandmother         ovarian ca    Ovarian Cancer Paternal Grandmother     Depression Paternal Grandmother     Uterine Cancer Paternal Grandmother     Cerebrovascular Disease Paternal Grandfather     Dementia Paternal Grandfather     Heart Disease Paternal Grandfather     Kidney Disease Paternal Aunt     Kidney Disease Paternal Aunt     Skin Cancer No family hx of     Glaucoma No family hx of     Macular Degeneration No family hx of     Amblyopia No family hx of     Melanoma No family hx of     Diabetes No family hx of             Allergies:      Allergies   Allergen Reactions    Penicillins Shortness Of Breath and Hives     Occurred in childhood     Keflex [Cephalexin Hcl] Unknown     Patient could not recall reaction to Keflex  Per chart, has tolerated cefazolin (2023)    Sulfa Antibiotics Rash    Tetracycline Unknown     Patient could not recall reaction, childhood reaction            Medications:     Prior to Admission Medications   Prescriptions Last Dose Informant Patient Reported? Taking?   Calcium Citrate-Vitamin D (CALCIUM CITRATE + D PO)   Yes No   Sig: Take 1 tablet by mouth daily.   Dextromethorphan-guaiFENesin (DIABETIC TUSSIN MAX ST)  MG/5ML LIQD   Yes No   Sig: Take 10-30 mLs by mouth every 6 hours as needed (cough).   FLUoxetine (PROZAC) 20 MG capsule   No No   Sig: Take 1 capsule (20 mg) by mouth daily. With 40mg for a total daily dose of 60mg   FLUoxetine (PROZAC) 40 MG capsule   No No   Sig: Take 1 capsule (40 mg) by mouth daily.   Multiple Vitamins-Iron (ONE DAILY MULTIVITAMIN/IRON) TABS  Self Yes No   Sig: Take 1 tablet by mouth daily   Omega-3 Fatty Acids (OMEGA 3 PO)  Self Yes No   Sig: Take 1 capsule by mouth daily Dose unknown   acetaminophen (TYLENOL) 500 MG tablet   Yes No   Sig: Take 500-1,000 mg by mouth every 6 hours as needed for mild pain or fever.   albuterol (PROAIR HFA) 108  (90 Base) MCG/ACT inhaler  Self No No   Sig: Inhale 2 puffs into the lungs every 6 hours as needed for shortness of breath / dyspnea or wheezing   aspirin 81 MG EC tablet   No No   Sig: Take 1 tablet (81 mg) by mouth daily   budesonide (PULMICORT FLEXHALER) 90 MCG/ACT inhaler   Yes No   Sig: Inhale 2 puffs into the lungs 2 times daily as needed (shortness of breath)   clindamycin (CLEOCIN) 150 MG capsule   Yes No   Sig: TAKE 2 CAPSULES BY MOUTH 1 HOUR PRIOR TO DENTAL APPOINTMENT. TAKE 1 CAPSULE BY MOUTH EVERY 6 HOURS AFTER APPOINTMENT   empagliflozin (JARDIANCE) 10 MG TABS tablet   No No   Sig: Take 1 tablet (10 mg) by mouth daily.   entecavir (BARACLUDE) 0.5 MG tablet   No No   Sig: Take 1 tablet (0.5 mg) by mouth daily.   fluticasone-salmeterol (ADVAIR) 250-50 MCG/ACT inhaler   No No   Sig: Inhale 1 puff into the lungs 2 times daily   Patient taking differently: Inhale 1 puff into the lungs 2 times daily as needed.   furosemide (LASIX) 20 MG tablet  Self No No   Sig: Take 1 tablet (20 mg) by mouth daily as needed (fluid retention)   hydrOXYzine HCl (ATARAX) 10 MG tablet   No No   Sig: Take 1 tablet (10 mg) by mouth daily as needed for anxiety   hydrOXYzine HCl (ATARAX) 25 MG tablet   No No   Sig: Take 1 tablet (25 mg) by mouth nightly as needed for anxiety (sleep)   isosorbide mononitrate (IMDUR) 60 MG 24 hr tablet   No No   Sig: Take 1 tablet (60 mg) by mouth daily.   latanoprost (XALATAN) 0.005 % ophthalmic solution  Self No No   Sig: Place 1 drop into both eyes At Bedtime   metoprolol succinate ER (TOPROL XL) 25 MG 24 hr tablet   No No   Sig: Take 0.5 tablets (12.5 mg) by mouth daily.   mycophenolate (GENERIC EQUIVALENT) 250 MG capsule   No No   Sig: Take 2 capsules (500 mg) by mouth 2 times daily.   mycophenolate (GENERIC EQUIVALENT) 250 MG capsule   No No   Sig: Take 2 capsules (500 mg) by mouth 2 times daily.   naltrexone (DEPADE/REVIA) 50 MG tablet   No No   Sig: Take 1 tablet (50 mg) by mouth daily.    nitroGLYcerin (NITROSTAT) 0.4 MG sublingual tablet  Self No No   Sig: Place 1 tablet (0.4 mg) under the tongue every 5 minutes as needed for chest pain   pantoprazole (PROTONIX) 40 MG EC tablet   No No   Sig: TAKE 1 TABLET BY MOUTH EVERY DAY   polyethylene glycol (MIRALAX) 17 g packet  Self Yes No   Sig: Take 17 g by mouth daily as needed    predniSONE (DELTASONE) 5 MG tablet   No No   Sig: Take 1 tablet (5 mg) by mouth daily.   rosuvastatin (CRESTOR) 20 MG tablet   No No   Sig: Take 1 tablet (20 mg) by mouth daily.   white petrolatum gel   No No   Sig: Apply topically daily as needed for dry skin (Apply to nose while using the nasal cannula to prevent nose bleeds).      Facility-Administered Medications: None             Physical Exam:   Blood pressure 118/65, pulse 55, temperature 97.8  F (36.6  C), temperature source Oral, resp. rate 16, weight 71.6 kg (157 lb 12.8 oz), SpO2 100%.  Constitutional: Awake and alert, in no apparent distress. Sitting up comfortably in bed.   Eyes: Sclera clear, anicteric   Respiratory: Breathing non-labored. Clear to auscultation bilaterally with no crackles, wheezing, or rhonchi. Good air entry throughout.   Cardiovascular:  RRR, normal S1/S2. No rubs or murmurs.  No peripheral edema.   GI: Soft, non-tender, non-distended. Normoactive bowel sounds.   Skin:  Good color. No jaundice. No visible rashes, lesions, or bruising of concern.   Neurologic: Alert and oriented

## 2025-07-22 NOTE — PROGRESS NOTES
"   07/22/25 0843   Appointment Info   Signing Clinician's Name / Credentials (OT) Bethany Abernathy OTR/L CBIS   Rehab Comments (OT) OT: eval completed, treatment initiated   Living Environment   People in Home other (see comments)  (owns house but has a tenant that rents a room)   Current Living Arrangements house   Home Accessibility stairs to enter home;stairs within home   Number of Stairs, Main Entrance 3   Number of Stairs, Within Home, Primary greater than 10 stairs  (12 to access upstairs for bathroom, laundry is in basement)   Transportation Anticipated family or friend will provide;car, drives self  (lyft or friends, drives but doesnt have car currently)   Living Environment Comments OT:pt lives in house in [facility] , retired, PTA pt was indep w/ adls and mob, did not use AD but owns a couple FWWs,roommate has a cat but pt doesnt do pet care but is \"forced to pet it sometimes\",roommate is only a tenant and does not assist w/ iadls but is willing to switch laundry for pt sometimes, pt orders groceries but simple meal prep mostly microwave items or cold items, reports no help w/ iadls but cleaning/laundry have been difficult to do, reports he knows he needs to start thinking about moving to a more supportive environment, pt has 3STE house but entry level is kitchen/living room, he needs to do full flight approx 12 stairs to access bathroom/bedroom, (laundry in basement), bathroom has tub/shower combo w/ grabbars and ext tub bench, STD ht toilet but uses countertop to push up from PRN, pt drives but currently does not have car, uses lyft or friend drives, pt did not use oxygen at baseline and has not had any resp rehab educ in the past, sleeps in regular bed w/ no special features, own reacher, no family in area, no kids, PTA enjoyed tapping maple trees and attends [employer] study group, plays guitar on own, likes gardening but its been difficult w/ health issues   Self-Care   Usual Activity Tolerance moderate "   Current Activity Tolerance poor   Regular Exercise Yes  (leg exer in bed or short distance walking)   Fall history within last six months no   General Information   Onset of Illness/Injury or Date of Surgery 07/09/25   Referring Physician Filemon Nowak MD   Patient/Family Therapy Goal Statement (OT) get home   Additional Occupational Profile Info/Pertinent History of Current Problem per chart review:Jerad Ross was admitted on 7/9/2025 for acute hypoxic respiratory failure.Heart failure with preserved ejection fraction-new diagnosis,S/p single renal transplant, see H&P for additional info   Existing Precautions/Restrictions fall;oxygen therapy device and L/min  (droplet precautions)   Limitations/Impairments safety/cognitive   Left Upper Extremity (Weight-bearing Status) full weight-bearing (FWB)   Right Upper Extremity (Weight-bearing Status) full weight-bearing (FWB)   Left Lower Extremity (Weight-bearing Status) full weight-bearing (FWB)   Right Lower Extremity (Weight-bearing Status) full weight-bearing (FWB)   Cognitive Status Examination   Cognitive Status Comments OT; pt demo mild memory impairments evidenced by providing inconsist responses on 2 occassions re: soc hx and OT educ pt on need to call for assist before going into bathroom but observed taking self to bathroom after OT eval and indicated not recalling being instructed to wait for assist for safety, assess PRN to assist w/ d/c recommendations   Visual Perception   Visual Acuity glasses for all the time(trifocals)   Pain Assessment   Patient Currently in Pain   (no c/o pain but reports dyspnea at rest and increased w/ activty)   Posture   Posture forward head position   Range of Motion Comprehensive   Comment, General Range of Motion R dom, AROM WFL   Strength Comprehensive (MMT)   Comment, General Manual Muscle Testing (MMT) Assessment generalized weakness, deconditioned,   Bed Mobility   Comment (Bed Mobility) OT; see gg codes   Transfers    Transfer Comments OT; see gg codes   Balance   Balance Comments static standing bal sba, sba to cga for dynamic standing bal and use of fWW w/ ambulation and standing when not near solid surface   Activities of Daily Living   BADL Assessment/Intervention   (OT; see gg codes)   Clinical Impression   Criteria for Skilled Therapeutic Interventions Met (OT) Yes, treatment indicated   OT Diagnosis decreased indep w/ adls/mobility   OT Problem List-Impairments impacting ADL activity tolerance impaired;balance;cognition;strength;pain   Assessment of Occupational Performance 3-5 Performance Deficits   Identified Performance Deficits drg, toileting, bathing, transfers, homemaking   Planned Therapy Interventions (OT) ADL retraining;IADL retraining;balance training;bed mobility training;cognition;strengthening;transfer training;progressive activity/exercise  (resp educ)   Clinical Decision Making Complexity (OT) problem focused assessment/low complexity   Risk & Benefits of therapy have been explained evaluation/treatment results reviewed;care plan/treatment goals reviewed;risks/benefits reviewed;current/potential barriers reviewed;participants voiced agreement with care plan;participants included;patient   Clinical Impression Comments OT: pt has a tenant that rents a room but do not share household chores. pt was indep w/ adls and no use of oxygen PTA but reports as medical issues progressed he was having more difficulty completing iadls , pt presents w/ impaired memory, SOB  and use of NC oxygen , deconditioned, generalized weakness resulting in pt functioning below baseline w/ adls/iadls/mobiity. recommend cont OT to address deficits to maximze safety and indep needed to return to previoius living situation w/ supports. pt also indicated he has been thinking about need for selling house and moving to a more supportive living situation.   OT Total Evaluation Time   OT Chris Low Complexity Minutes (40801) 15   Therapy  Certification   Start of Care Date 07/22/25   Certification date from 07/22/25   Certification date to 08/20/25   OT Goals   Therapy Frequency (OT) 5 times/week   OT Predicted Duration/Target Date for Goal Attainment 07/29/25   OT Goals Hygiene/Grooming;Upper Body Dressing;Lower Body Dressing;Upper Body Bathing;Lower Body Bathing;Bed Mobility;Transfers;Toilet Transfer/Toileting;Meal Preparation   OT: Hygiene/Grooming modified independent   OT: Upper Body Dressing Modified independent   OT: Lower Body Dressing Modified independent   OT: Upper Body Bathing Supervision/stand-by assist   OT: Lower Body Bathing Supervision/stand-by assist;using adaptive equipment   OT: Bed Mobility Modified independent   OT: Transfer Modified independent;with assistive device   OT: Toilet Transfer/Toileting Modified independent;using adaptive equipment   OT: Meal Preparation Modified independent;with simple meal preparation;ambulatory level   Self-Care/Home Management   Self-Care/Home Mgmt/ADL, Compensatory, Meal Prep Minutes (63562) 40   Treatment Detail/Skilled Intervention OT: educ on PLB and beginning educ on resp rehab educ and cooedinating breathing w/ activity,discussed AD options PRN, educ on approach to adls and mob w/ present level of funct, provided pt commode overlay for ease and safety w/ toileting while at TCU, see gg codes   OT Discharge Planning   OT Plan OT: precautions: droplet, falls, NC oxygen, mild cog impairments, Rx:resp educ w/ adls, simple mealprep/kitchen mob, goal to wean off oxygen before d/c,  full body adls,activity marlena,  ok to leave room w/ mask to shower room, shower/gg codes on 7/23   OT Rationale for DC Rec antic pt may benefit from commode overlay or RTS at d/c due to pt has std ht toilet and no grabbar, pt has tub/shower comb w/ grabbars and ext, FWW and reacher at home   Total Session Time   Timed Code Treatment Minutes 40   Total Session Time (sum of timed and untimed services) 55   Post Acute  Settings Only   What unit is patient on? TCU   OT- Transitional Care Unit Time   Individual Time (minutes) - OT 55   TCU Total Session Time (minutes) - OT 55   TCU Daily Total Session Time   OT TCU Daily Total Session Time 55   Rehab TCU Daily Total Session Time 55   Bed Mobility: Turning side to side/Roll Left and Right   Describe Performance OT; modified indep, bed rail, oxygen   Bed Mobility: Sit to lying   Describe Performance OT; modified indep, no bed rail, flat bed, nc oxygen   Bed Mobility: Lying to sitting on the side of bed   Describe Performance OT; modified indep, bed rail, flat bed, nc oxygen   Transfers: Sit to Stand   Describe Performance OT; sba to cga, fww, nc oxygen   Transfers: Chair/Bed transfers   Describe Performance OT; sba to cga, fww, nc oxygen   Walk 10 Feet - Ability to walk once standing   Describe Performance OT; sba to cga, fww, nc oxygen   Picking up Object - Ability to bend/stoop while standing.   Reason Not Done Safety concerns   Upper Body Dressing - Ability to dress/undress above the waist, including fasteners   Describe Performance OT: setup, sba, gown, no access to clothes on eval   Lower Body Dressing - Ability to dress/undress below the waist, includes fasteners   Describe Performance OT; cga and fww to cynthia shorts from EOB/standing to pull up over hips   Lower Body Dressing (Putting On/Taking-Off Footwear)   Describe Performance OT; setup, donned orthopedic shoes due to leg length discrepency and velcro closure from EOB   Toileting Hygiene - Ability to maintain perineal hygiene and adjust clothes   Describe Performance OT: NT, clinical judegemnt sba to cga for balance w/ stnading at toilet w/ fww and grabbar   Toilet transfer - Ability to get on and off a toilet or commode   Describe Performance OT: cga to Mychal from 18 in toilet w/ grabbar and fww, OT provided pt w/ commode overlay for ease resulting in sba and safety cues for standing w/ fww   Personal Hygiene - Ability to  maintain personal hygiene (combing hair, shaving, apply makeup wash/dry face/hands.   Describe Performance OT; setup in sititng at sink   Oral Hygiene - Ability to use suitable items to clean teeth.   Describe Performance OT; cga, cues for safer and to impliment EC approach standing at sink w/ fww

## 2025-07-22 NOTE — PLAN OF CARE
Goal Outcome Evaluation:      Plan of Care Reviewed With: patient    Overall Patient Progress: improvingOverall Patient Progress: improving  Orientation: Alert and Oriented x 4. Able to make needs known.    Bowel: Continent, LBM today.  Bladder: Continent , uses bath room.  Ambulation/Transfers:Assist of one person with walker and GB.   Blood Sugars:None.   Diet/Liquids: Low Saturated Fat Sodium diet < 2400 mg , thin liquids.  Tubes/Lines/Drains: None  Oxygen: On supplement O2 at 2 LMP via NC.     Skin: intact.  Pain: Denied.  Other:Offered PRN guaifenesin for cough, PRN tylenol for head ache, and effective.Denied SOB, N/V and chest pain.  Bed alarm on for safety, call light within reach. Continue with POC.    Patient's most recent vital signs are:     Vital signs:  BP: 117/59  Temp: 97.8  HR: 58  RR: 16  SpO2: 100 %     Patient does not have new respiratory symptoms.  Patient does not have new sore throat.  Patient does not have a fever greater than 99.5.

## 2025-07-22 NOTE — PLAN OF CARE
Goal Outcome Evaluation:      Plan of Care Reviewed With: patient    Overall Patient Progress: no changeOverall Patient Progress: no change    Outcome Evaluation: See progress note      VS: /66 (BP Location: Right arm, Patient Position: Semi-Botello's, Cuff Size: Adult Regular)   Pulse 76   Temp 97.8  F (36.6  C) (Oral)   Resp 16   SpO2 97%    O2: SpO2 >95% maintained on 2L NC, slow wean off O2. Occasional dry cough   Output: Continent x2, utilizes BSU   Last BM: 7/21 per pt report, continent of BM   Activity: Assist x1 w/ walker, GB. Increased SOB with activity resolved at rest   Skin: Visible skin intact   Pain: Denies   CMS: Intact: A/Ox4, denies new N/T   Diet: < 2400 g fat, low Na diet, regular texture. Takes medications whole with thin liquids, denies new N/V/D   Equipment: NC, walker, personal belongings   Plan: POC ongoing   Additional Info: Droplet precautions maintained. Call light within reach, able to make needs known. Bed alarm on and safety checks completed with no acute issues noted overnight.

## 2025-07-23 ENCOUNTER — PATIENT OUTREACH (OUTPATIENT)
Dept: CARE COORDINATION | Facility: CLINIC | Age: 65
End: 2025-07-23
Payer: COMMERCIAL

## 2025-07-23 ENCOUNTER — APPOINTMENT (OUTPATIENT)
Dept: OCCUPATIONAL THERAPY | Facility: SKILLED NURSING FACILITY | Age: 65
DRG: 948 | End: 2025-07-23
Attending: INTERNAL MEDICINE
Payer: COMMERCIAL

## 2025-07-23 ENCOUNTER — APPOINTMENT (OUTPATIENT)
Dept: PHYSICAL THERAPY | Facility: SKILLED NURSING FACILITY | Age: 65
DRG: 948 | End: 2025-07-23
Attending: INTERNAL MEDICINE
Payer: COMMERCIAL

## 2025-07-23 PROCEDURE — 250N000012 HC RX MED GY IP 250 OP 636 PS 637: Performed by: INTERNAL MEDICINE

## 2025-07-23 PROCEDURE — 120N000009 HC R&B SNF

## 2025-07-23 PROCEDURE — 250N000013 HC RX MED GY IP 250 OP 250 PS 637: Performed by: PHYSICIAN ASSISTANT

## 2025-07-23 PROCEDURE — 250N000011 HC RX IP 250 OP 636: Performed by: INTERNAL MEDICINE

## 2025-07-23 PROCEDURE — 250N000013 HC RX MED GY IP 250 OP 250 PS 637: Performed by: INTERNAL MEDICINE

## 2025-07-23 PROCEDURE — 99305 1ST NF CARE MODERATE MDM 35: CPT | Performed by: INTERNAL MEDICINE

## 2025-07-23 PROCEDURE — 97110 THERAPEUTIC EXERCISES: CPT | Mod: GP

## 2025-07-23 PROCEDURE — 97535 SELF CARE MNGMENT TRAINING: CPT | Mod: GO

## 2025-07-23 PROCEDURE — 250N000012 HC RX MED GY IP 250 OP 636 PS 637: Performed by: PHYSICIAN ASSISTANT

## 2025-07-23 RX ADMIN — Medication 12.5 MG: at 09:00

## 2025-07-23 RX ADMIN — GUAIFENESIN AND DEXTROMETHORPHAN 10 ML: 100; 10 SYRUP ORAL at 08:59

## 2025-07-23 RX ADMIN — Medication 1 TABLET: at 12:47

## 2025-07-23 RX ADMIN — EMPAGLIFLOZIN 10 MG: 10 TABLET, FILM COATED ORAL at 08:59

## 2025-07-23 RX ADMIN — ROSUVASTATIN CALCIUM 20 MG: 10 TABLET, FILM COATED ORAL at 08:59

## 2025-07-23 RX ADMIN — MYCOPHENOLATE MOFETIL 500 MG: 250 CAPSULE ORAL at 20:58

## 2025-07-23 RX ADMIN — ENOXAPARIN SODIUM 40 MG: 40 INJECTION SUBCUTANEOUS at 08:59

## 2025-07-23 RX ADMIN — ISOSORBIDE MONONITRATE 60 MG: 60 TABLET, EXTENDED RELEASE ORAL at 09:00

## 2025-07-23 RX ADMIN — PREDNISONE 5 MG: 5 TABLET ORAL at 09:00

## 2025-07-23 RX ADMIN — PANTOPRAZOLE SODIUM 40 MG: 40 TABLET, DELAYED RELEASE ORAL at 09:00

## 2025-07-23 RX ADMIN — FLUOXETINE HYDROCHLORIDE 60 MG: 40 CAPSULE ORAL at 08:59

## 2025-07-23 RX ADMIN — NALTREXONE HYDROCHLORIDE 50 MG: 50 TABLET, FILM COATED ORAL at 09:00

## 2025-07-23 RX ADMIN — LATANOPROST 1 DROP: 50 SOLUTION/ DROPS OPHTHALMIC at 20:59

## 2025-07-23 RX ADMIN — ACETAMINOPHEN 650 MG: 325 TABLET, FILM COATED ORAL at 16:07

## 2025-07-23 RX ADMIN — GUAIFENESIN AND DEXTROMETHORPHAN 10 ML: 100; 10 SYRUP ORAL at 22:21

## 2025-07-23 RX ADMIN — FLUTICASONE FUROATE AND VILANTEROL TRIFENATATE 1 PUFF: 100; 25 POWDER RESPIRATORY (INHALATION) at 08:58

## 2025-07-23 RX ADMIN — ENTECAVIR 0.5 MG: 0.5 TABLET ORAL at 09:00

## 2025-07-23 RX ADMIN — ASPIRIN 81 MG: 81 TABLET, DELAYED RELEASE ORAL at 09:00

## 2025-07-23 RX ADMIN — Medication 5 MG: at 22:21

## 2025-07-23 RX ADMIN — MYCOPHENOLATE MOFETIL 500 MG: 250 CAPSULE ORAL at 08:59

## 2025-07-23 RX ADMIN — ACETAMINOPHEN 650 MG: 325 TABLET, FILM COATED ORAL at 09:12

## 2025-07-23 ASSESSMENT — ACTIVITIES OF DAILY LIVING (ADL)
ADLS_ACUITY_SCORE: 49

## 2025-07-23 NOTE — PLAN OF CARE
Goal Outcome Evaluation:      Plan of Care Reviewed With: patient    Overall Patient Progress: no change    Pt. AOx4. Able to make needs known. Pt is using O2 @ 2lpm via NC, SPO2 >92%. Denies CP and SOB. Continent of Bowel and bladder. Last BM: 7/22 per report. Maintained pt on Droplet Precaution (rhinovirus). No acute issues overnight. Call light w/in reach. Continue POC.    Patient's most recent vital signs are:     Vital signs:  BP: 95/56  Temp: 96.2  HR: 65  RR: 20  SpO2: 93 %     Patient does not have new respiratory symptoms.  Patient does not have new sore throat.  Patient does not have a fever greater than 99.5..

## 2025-07-23 NOTE — CONSULTS
SPIRITUAL HEALTH SERVICES Consult Note    Summary: I met with Jerad this afternoon. He shared that he has been coping well today. He asked me about getting a Bible to read, which I brought to him. He did not have any other spiritual care needs today.    Plan: I will visit occasionally as pt remains in the hospital.     Tree Romero M.Div.  Associate

## 2025-07-23 NOTE — PLAN OF CARE
Goal Outcome Evaluation:      Plan of Care Reviewed With: patient    Overall Patient Progress: improvingOverall Patient Progress: improving    Orientation:Alert and Oriented x 4. Able to make needs known.   Bowel: Continent.  Bladder: Continent , uses urinal at bedside.  Ambulation/Transfers: A/1 with walker and GB.  Blood Sugars: None  Diet/Liquids: Low Saturated Fat Sodium diet < 2400 mg , thin liquids.   Tubes/Lines/Drains:none   Oxygen: On supplement O2 at 2 LMP via NC   Skin:Intact   Pain:  C/o Right hip pain , offered PRN Tylenol x 2, and effective. PRN cough medication offered. Denied chest pain, SOB, N/V.   Bed alarm on for safety, call light within reach. Continue with POC.      Patient's most recent vital signs are:     Vital signs:  BP: 121/63  Temp: 97.3  HR: 67  RR: 18  SpO2: 92 %     Patient does not have new respiratory symptoms.  Patient does not have new sore throat.  Patient does not have a fever greater than 99.5.

## 2025-07-23 NOTE — H&P
[facility]    History and Physical - Hospitalist Service       Date of Admission:  7/21/2025    Assessment & Plan      Jerad Ross is a 65 year old man with a history of NSTEMI (2014), FSGS s/p DDKT (1978, 1993), HTN, chronic anemia, PE, chronic HBV, COPD, who was admitted to [facility] 7/9 - 7/21/25 with acute hypoxic respiratory failure felt to be secondary to rhinovirus and newly diagnosed HFpEF with acute decompensation. Transferred to  TCU on 7/21/25 for physical rehabilitation.      Physical deconditioning: PT, OT consults.     Acute hypoxic respiratory failure-improving  Rhinovirus infection  COPD  Presented with dyspnea and hypoxia initially requiring BiPAP. CT chest revealed multifocal GGOs, negative for PE. RVP positive for rhinovirus infection. HFpEF exacerbation also contributing to AHRF, was diursed with IV Lasix. TTE also revealed new LV diastolic dysfunction and he was diuresed with IV Lasix. O2 needs improved to 2 LPM.    - Breo Ellipta once daily   - Continue droplet precautions until symptoms resolved   - Continue O2 to maintain SpO2 >92%, wean as able   - Consider repeat chest CT as outpatient    -Anticipate patient will require home O2 eval prior to discharge.    - Continue to wean off supplemental oxygen while in TCU       HFpEF with acute decompensation  CAD s/p CABG  NSTEMI (2014)  New diagnosis. TTE 7/14  revealed grade I LV diastolic dysfunction, EF 55-65%. CXR with pulmonary edema. Diuresed with IV Lasix. EDW ~152 lbs. Currently appears euvolemic.    - Continue Jardiance (started inpatient)   - Metoprolol 12.5 mg daily   - Imdur 60 mg daily   - ASA 81 daily, PTA statin   - Daily weights   - Needs Cardiology follow-up, referral placed     FSGS s/p renal transplant x2 (1978, 1993): Renal function stable. No acute issues.   - Prednisone 5 mg daily   - mycophenolate 500 mg daily. Reviewed with Nephrology today, plan to increase back to  mg BID in 1 week.    -  Monitor BMP weekly     Other Stable Medical Issues:  Chronic HBV: Continue PTA entecavir.   Depression, anxiety: Continue fluoxetine, PRN hydroxyzine.   Insomnia: Melatonin 5 mg at bedtime PRN.   GERD: Continue PTA PPI.   Glaucoma: Continue PTA latanoprost drops.   Alcohol use disorder, in remission: Continue PTA naltrexone.   Hx of PE: Provoked after hip surgery in 8/2023.     Indication for psychotropic medications:  fluoxetine, hydroxyzine for MDD and KIM   Pneumococcal Vaccination Status: Received PCV23 in 2018, due for PCV20         Diet: Combination Diet Low Saturated Fat Sodium <2400mg Diet  Room Service    DVT Prophylaxis: Enoxaparin (Lovenox) SQ  [place] Catheter: Not present  Lines: None     Cardiac Monitoring: None  Code Status: No CPR- Do NOT Intubate      Clinically Significant Risk Factors                   # Hypertension: Noted on problem list                       Disposition Plan     Medically Ready for Discharge: Anticipated in 5+ Days           ESTEFANI MANCUSO MD  Hospitalist Service  [facility]  Securely message with [employer] (more info)  Text page via [facility] Paging/Directory     ______________________________________________________________________        History of Present Illness   Jerad Ross is a 65 year old man with a history of NSTEMI (2014), FSGS s/p DDKT (1978, 1993), HTN, chronic anemia, PE, chronic HBV, COPD, who was admitted to [facility] 7/9 - 7/21/25 with acute hypoxic respiratory failure felt to be secondary to rhinovirus and newly diagnosed HFpEF with acute decompensation. Transferred to  TCU on 7/21/25 for physical rehabilitation.      Currently, patient reports doing well overall. Has ongoing dry cough and nasal congestion. Nasal passages feel dry and he is having small intermittent bouts of epistaxis. Dyspnea improved overall. No fevers.      Past Medical History    Past Medical History:   Diagnosis Date    Acute midline low back pain without sciatica     AION  (acute ischemic optic neuropathy)     Anemia in chronic renal disease     Anxiety     Arthritis 1978    Post transplant    Avascular necrosis of bones of both hips (H)     s/p bilateral hip replacements    Avascular necrosis of bones of both hips (H)     s/p bilateral hip replacements    Basal cell carcinoma     Cataract     Chronic hepatitis B (H)     Chronic osteoarthritis 1978    Post transplant    Clostridium difficile colitis     COPD (chronic obstructive pulmonary disease) (H)     Coronary artery disease involving native coronary artery of native heart without angina pectoris 06/17/2014    Coronary angiogram 6/17/14: Severe distal 3-vessel disease involving small vessels, not amenable to PCI or CABG.    CRP elevated     Depression     Depressive disorder 1978    Post transplant    Dialysis patient     Diverticulosis     Dyslipidemia     Elevated C-reactive protein (CRP)     FSGS (focal segmental glomerulosclerosis)     FSGS (focal segmental glomerulosclerosis)     Gastric ulcer with hemorrhage 02/12/2012    Gastric ulcer with hemorrhage 02/12/2012    GERD (gastroesophageal reflux disease)     Glaucoma     OHTN    Glaucoma     Hemorrhoids     History of blood transfusion     HTN (hypertension)     Hyperlipidemia 1978    Hypertension secondary to other renal disorders     Hypogonadism in male     Immunosuppressed status     Immunosuppression     Kidney replaced by transplant     focal glomerulosclerosis     Kidney transplanted     focal glomerulosclerosis     NSTEMI (non-ST elevated myocardial infarction) (H) 06/17/2014    NSTEMI (non-ST elevated myocardial infarction) (H) 06/17/2014    JULIANE (obstructive sleep apnea)     Doesn't use CPAP    Paracentral scotoma     LE    Renal failure     Secondary renal hyperparathyroidism     Septic bursitis     Squamous cell carcinoma     Uncomplicated asthma 2003    Well controled       Past Surgical History   Past Surgical History:   Procedure Laterality Date    APPENDECTOMY       ARTHROPLASTY REVISION HIP Right 06/19/2023    Procedure: RIGHT HIP REVISION;  Surgeon: Juan Mcdonough MD;  Location: [facility] OR    BIOPSY      Cardiac Bypass surgery  06/08/2020    [facility]     CATARACT EXTRACTION EXTRACAPSULAR W/ INTRAOCULAR LENS IMPLANTATION Bilateral 4-20-10, 6-1-10    CATARACT IOL, RT/LT  04/19/2000    RE    CATARACT IOL, RT/LT  06/01/2000    LE    COLECTOMY PARTIAL  01/01/1983     10 cm, diverticulitis     COLECTOMY SUBTOTAL  1983    10 cm, diverticulitis    COLONOSCOPY  02/13/2012    Procedure:COLONOSCOPY; Surgeon:SLOAN GALLARDO; Location:[facility]    COLONOSCOPY N/A 01/22/2020    Procedure: Colonoscopy, With Polypectomy And Biopsy;  Surgeon: Aston Kiran MD;  Location:  GI    COLONOSCOPY N/A 06/05/2025    Procedure: Colonoscopy;  Surgeon: Lina Claros MD;  Location:  GI    CV CORONARY ANGIOGRAM N/A 06/02/2020    Procedure: Coronary Angiogram;  Surgeon: Alona Florentino MD;  Location: [facility];  Service: Cardiology    CV CORONARY ANGIOGRAM N/A 09/20/2021    Procedure: Coronary Angiogram;  Surgeon: Adam Agudelo MD;  Location: [facility]    CV LEFT HEART CATHETERIZATION WITHOUT LEFT VENTRICULOGRAM Left 06/02/2020    Procedure: Left Heart Catheterization Without Left Ventriculogram;  Surgeon: Alona Florentino MD;  Location: [facility];  Service: Cardiology    CV SUPRAVALVULAR AORTOGRAM N/A 09/20/2021    Procedure: Supravalvular Aortagram;  Surgeon: Adam Agudelo MD;  Location: [facility] CV    ESOPHAGOSCOPY, GASTROSCOPY, DUODENOSCOPY (EGD), COMBINED  02/13/2012    Procedure:COMBINED ESOPHAGOSCOPY, GASTROSCOPY, DUODENOSCOPY (EGD); Surgeon:SLOAN GALLARDO; Location: GI    ESOPHAGOSCOPY, GASTROSCOPY, DUODENOSCOPY (EGD), COMBINED  02/13/2012    EXTRACAPSULAR CATARACT EXTRATION WITH INTRAOCULAR LENS IMPLANT  4-20-10, 6-1-10    Rt, Lt    HERNIA REPAIR  1995    Lt inguinal    HERNIA REPAIR  1995     Lt inguinal    HIP SURGERY      1981, bilat MITZI, revised 2001, 2005    HIP SURGERY  01/01/1981    bilat MITZI, revised 2001, 2005    IR FINE NEEDLE ASPIRATION W ULTRASOUND  04/10/2023    JOINT REPLACEMENT      KIDNEY SURGERY  1978 and 1993    transplant    KNEE SURGERY  1983, 1987, 2001    bilat TKA    MOHS MICROGRAPHIC PROCEDURE      MOHS MICROGRAPHIC PROCEDURE      ORTHOPEDIC SURGERY      OTHER SURGICAL HISTORY      kidney [id number], 1993    PHACOEMULSIFICATION CLEAR CORNEA WITH STANDARD INTRAOCULAR LENS IMPLANT Right 04/19/2000    PHACOEMULSIFICATION CLEAR CORNEA WITH STANDARD INTRAOCULAR LENS IMPLANT Left 06/01/2000    WI BOWEL TO BOWEL ANASTOMOSIS      WI CARDIAC SURG PROCEDURE UNLISTED  2020    3x bypass    WI PELVIS/HIP JOINT SURGERY UNLISTED  1981, 1996, 2005, 2023    Both hips, L 1x rev. R 2x rev.    WI STOMACH SURGERY PROCEDURE UNLISTED  1978    Splenectomy    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, auxiliary spleen    TONSILLECTOMY      TRANSPLANT      VASCULAR SURGERY  1976       Prior to Admission Medications   Prior to Admission Medications   Prescriptions Last Dose Informant Patient Reported? Taking?   Calcium Citrate-Vitamin D (CALCIUM CITRATE + D PO)   Yes No   Sig: Take 1 tablet by mouth daily.   Dextromethorphan-guaiFENesin (DIABETIC TUSSIN MAX ST)  MG/5ML LIQD   Yes No   Sig: Take 10-30 mLs by mouth every 6 hours as needed (cough).   FLUoxetine (PROZAC) 20 MG capsule   No No   Sig: Take 1 capsule (20 mg) by mouth daily. With 40mg for a total daily dose of 60mg   FLUoxetine (PROZAC) 40 MG capsule   No No   Sig: Take 1 capsule (40 mg) by mouth daily.   Multiple Vitamins-Iron (ONE DAILY MULTIVITAMIN/IRON) TABS  Self Yes No   Sig: Take 1 tablet by mouth daily   Omega-3 Fatty Acids (OMEGA 3 PO)  Self Yes No   Sig: Take 1 capsule by mouth daily Dose unknown   acetaminophen (TYLENOL) 500 MG tablet   Yes No   Sig: Take 500-1,000 mg by mouth every 6 hours as needed for mild pain or fever.   albuterol  (PROAIR HFA) 108 (90 Base) MCG/ACT inhaler  Self No No   Sig: Inhale 2 puffs into the lungs every 6 hours as needed for shortness of breath / dyspnea or wheezing   aspirin 81 MG EC tablet   No No   Sig: Take 1 tablet (81 mg) by mouth daily   budesonide (PULMICORT FLEXHALER) 90 MCG/ACT inhaler   Yes No   Sig: Inhale 2 puffs into the lungs 2 times daily as needed (shortness of breath)   clindamycin (CLEOCIN) 150 MG capsule   Yes No   Sig: TAKE 2 CAPSULES BY MOUTH 1 HOUR PRIOR TO DENTAL APPOINTMENT. TAKE 1 CAPSULE BY MOUTH EVERY 6 HOURS AFTER APPOINTMENT   empagliflozin (JARDIANCE) 10 MG TABS tablet   No No   Sig: Take 1 tablet (10 mg) by mouth daily.   entecavir (BARACLUDE) 0.5 MG tablet   No No   Sig: Take 1 tablet (0.5 mg) by mouth daily.   fluticasone-salmeterol (ADVAIR) 250-50 MCG/ACT inhaler   No No   Sig: Inhale 1 puff into the lungs 2 times daily   Patient taking differently: Inhale 1 puff into the lungs 2 times daily as needed.   furosemide (LASIX) 20 MG tablet  Self No No   Sig: Take 1 tablet (20 mg) by mouth daily as needed (fluid retention)   hydrOXYzine HCl (ATARAX) 10 MG tablet   No No   Sig: Take 1 tablet (10 mg) by mouth daily as needed for anxiety   hydrOXYzine HCl (ATARAX) 25 MG tablet   No No   Sig: Take 1 tablet (25 mg) by mouth nightly as needed for anxiety (sleep)   isosorbide mononitrate (IMDUR) 60 MG 24 hr tablet   No No   Sig: Take 1 tablet (60 mg) by mouth daily.   latanoprost (XALATAN) 0.005 % ophthalmic solution  Self No No   Sig: Place 1 drop into both eyes At Bedtime   metoprolol succinate ER (TOPROL XL) 25 MG 24 hr tablet   No No   Sig: Take 0.5 tablets (12.5 mg) by mouth daily.   mycophenolate (GENERIC EQUIVALENT) 250 MG capsule   No No   Sig: Take 2 capsules (500 mg) by mouth 2 times daily.   mycophenolate (GENERIC EQUIVALENT) 250 MG capsule   No No   Sig: Take 2 capsules (500 mg) by mouth 2 times daily.   naltrexone (DEPADE/REVIA) 50 MG tablet   No No   Sig: Take 1 tablet (50 mg) by  mouth daily.   nitroGLYcerin (NITROSTAT) 0.4 MG sublingual tablet  Self No No   Sig: Place 1 tablet (0.4 mg) under the tongue every 5 minutes as needed for chest pain   pantoprazole (PROTONIX) 40 MG EC tablet   No No   Sig: TAKE 1 TABLET BY MOUTH EVERY DAY   polyethylene glycol (MIRALAX) 17 g packet  Self Yes No   Sig: Take 17 g by mouth daily as needed    predniSONE (DELTASONE) 5 MG tablet   No No   Sig: Take 1 tablet (5 mg) by mouth daily.   rosuvastatin (CRESTOR) 20 MG tablet   No No   Sig: Take 1 tablet (20 mg) by mouth daily.   white petrolatum gel   No No   Sig: Apply topically daily as needed for dry skin (Apply to nose while using the nasal cannula to prevent nose bleeds).      Facility-Administered Medications: None           Physical Exam   Vital Signs: Temp: 97.3  F (36.3  C) Temp src: Oral BP: 121/63 Pulse: 67   Resp: 18 SpO2: 92 % O2 Device: Nasal cannula    Weight: 157 lbs 12.8 oz    Constitutional: Awake and alert, in no apparent distress. Sitting up comfortably in bed.   Eyes: Sclera clear, anicteric   Respiratory: Breathing non-labored. Clear to auscultation bilaterally with no crackles, wheezing, or rhonchi. Good air entry throughout.   Cardiovascular:  RRR, normal S1/S2. No rubs or murmurs.  No peripheral edema.   GI: Soft, non-tender, non-distended. Normoactive bowel sounds.   Skin:  Good color. No jaundice. No visible rashes, lesions, or bruising of concern.   Neurologic: Alert and oriented    Medical Decision Making       45 MINUTES SPENT BY ME on the date of service doing chart review, history, exam, documentation & further activities per the note.      Data   ------------------------- PAST 24 HR DATA REVIEWED -----------------------------------------------        Imaging results reviewed over the past 24 hrs:   No results found for this or any previous visit (from the past 24 hours).

## 2025-07-24 ENCOUNTER — APPOINTMENT (OUTPATIENT)
Dept: OCCUPATIONAL THERAPY | Facility: SKILLED NURSING FACILITY | Age: 65
DRG: 948 | End: 2025-07-24
Attending: INTERNAL MEDICINE
Payer: COMMERCIAL

## 2025-07-24 ENCOUNTER — APPOINTMENT (OUTPATIENT)
Dept: PHYSICAL THERAPY | Facility: SKILLED NURSING FACILITY | Age: 65
DRG: 948 | End: 2025-07-24
Attending: INTERNAL MEDICINE
Payer: COMMERCIAL

## 2025-07-24 VITALS
RESPIRATION RATE: 16 BRPM | WEIGHT: 159.2 LBS | HEART RATE: 56 BPM | SYSTOLIC BLOOD PRESSURE: 106 MMHG | OXYGEN SATURATION: 96 % | TEMPERATURE: 97.7 F | BODY MASS INDEX: 24.93 KG/M2 | DIASTOLIC BLOOD PRESSURE: 59 MMHG

## 2025-07-24 LAB
ANION GAP SERPL CALCULATED.3IONS-SCNC: 11 MMOL/L (ref 7–15)
BUN SERPL-MCNC: 10.1 MG/DL (ref 8–23)
CALCIUM SERPL-MCNC: 9.1 MG/DL (ref 8.8–10.4)
CHLORIDE SERPL-SCNC: 101 MMOL/L (ref 98–107)
CREAT SERPL-MCNC: 0.75 MG/DL (ref 0.67–1.17)
EGFRCR SERPLBLD CKD-EPI 2021: >90 ML/MIN/1.73M2
ERYTHROCYTE [DISTWIDTH] IN BLOOD BY AUTOMATED COUNT: 15.4 % (ref 10–15)
GLUCOSE SERPL-MCNC: 82 MG/DL (ref 70–99)
HCO3 SERPL-SCNC: 27 MMOL/L (ref 22–29)
HCT VFR BLD AUTO: 40 % (ref 40–53)
HGB BLD-MCNC: 12.3 G/DL (ref 13.3–17.7)
MCH RBC QN AUTO: 28.4 PG (ref 26.5–33)
MCHC RBC AUTO-ENTMCNC: 30.8 G/DL (ref 31.5–36.5)
MCV RBC AUTO: 92 FL (ref 78–100)
PLATELET # BLD AUTO: 418 10E3/UL (ref 150–450)
POTASSIUM SERPL-SCNC: 4.4 MMOL/L (ref 3.4–5.3)
RBC # BLD AUTO: 4.33 10E6/UL (ref 4.4–5.9)
SODIUM SERPL-SCNC: 139 MMOL/L (ref 135–145)
WBC # BLD AUTO: 6.8 10E3/UL (ref 4–11)

## 2025-07-24 PROCEDURE — 250N000013 HC RX MED GY IP 250 OP 250 PS 637: Performed by: PHYSICIAN ASSISTANT

## 2025-07-24 PROCEDURE — 97110 THERAPEUTIC EXERCISES: CPT | Mod: GP

## 2025-07-24 PROCEDURE — 250N000013 HC RX MED GY IP 250 OP 250 PS 637: Performed by: INTERNAL MEDICINE

## 2025-07-24 PROCEDURE — 250N000011 HC RX IP 250 OP 636: Performed by: INTERNAL MEDICINE

## 2025-07-24 PROCEDURE — 85014 HEMATOCRIT: CPT | Performed by: INTERNAL MEDICINE

## 2025-07-24 PROCEDURE — 250N000012 HC RX MED GY IP 250 OP 636 PS 637: Performed by: PHYSICIAN ASSISTANT

## 2025-07-24 PROCEDURE — 250N000012 HC RX MED GY IP 250 OP 636 PS 637: Performed by: INTERNAL MEDICINE

## 2025-07-24 PROCEDURE — 36415 COLL VENOUS BLD VENIPUNCTURE: CPT | Performed by: INTERNAL MEDICINE

## 2025-07-24 PROCEDURE — 80048 BASIC METABOLIC PNL TOTAL CA: CPT | Performed by: INTERNAL MEDICINE

## 2025-07-24 PROCEDURE — 120N000009 HC R&B SNF

## 2025-07-24 PROCEDURE — 97535 SELF CARE MNGMENT TRAINING: CPT | Mod: GO

## 2025-07-24 RX ADMIN — Medication 1 TABLET: at 12:09

## 2025-07-24 RX ADMIN — MYCOPHENOLATE MOFETIL 500 MG: 250 CAPSULE ORAL at 20:52

## 2025-07-24 RX ADMIN — FLUTICASONE FUROATE AND VILANTEROL TRIFENATATE 1 PUFF: 100; 25 POWDER RESPIRATORY (INHALATION) at 08:52

## 2025-07-24 RX ADMIN — ROSUVASTATIN CALCIUM 20 MG: 10 TABLET, FILM COATED ORAL at 08:52

## 2025-07-24 RX ADMIN — NALTREXONE HYDROCHLORIDE 50 MG: 50 TABLET, FILM COATED ORAL at 08:52

## 2025-07-24 RX ADMIN — ASPIRIN 81 MG: 81 TABLET, DELAYED RELEASE ORAL at 08:52

## 2025-07-24 RX ADMIN — MYCOPHENOLATE MOFETIL 500 MG: 250 CAPSULE ORAL at 08:52

## 2025-07-24 RX ADMIN — ISOSORBIDE MONONITRATE 60 MG: 60 TABLET, EXTENDED RELEASE ORAL at 08:52

## 2025-07-24 RX ADMIN — GUAIFENESIN AND DEXTROMETHORPHAN 10 ML: 100; 10 SYRUP ORAL at 16:03

## 2025-07-24 RX ADMIN — PANTOPRAZOLE SODIUM 40 MG: 40 TABLET, DELAYED RELEASE ORAL at 08:52

## 2025-07-24 RX ADMIN — GUAIFENESIN AND DEXTROMETHORPHAN 10 ML: 100; 10 SYRUP ORAL at 23:40

## 2025-07-24 RX ADMIN — FLUOXETINE HYDROCHLORIDE 60 MG: 40 CAPSULE ORAL at 08:52

## 2025-07-24 RX ADMIN — ENOXAPARIN SODIUM 40 MG: 40 INJECTION SUBCUTANEOUS at 08:53

## 2025-07-24 RX ADMIN — EMPAGLIFLOZIN 10 MG: 10 TABLET, FILM COATED ORAL at 08:53

## 2025-07-24 RX ADMIN — ENTECAVIR 0.5 MG: 0.5 TABLET ORAL at 08:53

## 2025-07-24 RX ADMIN — ACETAMINOPHEN 650 MG: 325 TABLET, FILM COATED ORAL at 08:53

## 2025-07-24 RX ADMIN — PREDNISONE 5 MG: 5 TABLET ORAL at 08:52

## 2025-07-24 RX ADMIN — LATANOPROST 1 DROP: 50 SOLUTION/ DROPS OPHTHALMIC at 20:57

## 2025-07-24 RX ADMIN — Medication 5 MG: at 23:40

## 2025-07-24 RX ADMIN — ACETAMINOPHEN 650 MG: 325 TABLET, FILM COATED ORAL at 23:40

## 2025-07-24 RX ADMIN — ACETAMINOPHEN 650 MG: 325 TABLET, FILM COATED ORAL at 16:03

## 2025-07-24 RX ADMIN — GUAIFENESIN AND DEXTROMETHORPHAN 10 ML: 100; 10 SYRUP ORAL at 08:53

## 2025-07-24 ASSESSMENT — ACTIVITIES OF DAILY LIVING (ADL)
ADLS_ACUITY_SCORE: 49
ADLS_ACUITY_SCORE: 52
ADLS_ACUITY_SCORE: 49
ADLS_ACUITY_SCORE: 52
ADLS_ACUITY_SCORE: 49
ADLS_ACUITY_SCORE: 52
ADLS_ACUITY_SCORE: 49
ADLS_ACUITY_SCORE: 52
ADLS_ACUITY_SCORE: 49
ADLS_ACUITY_SCORE: 52
ADLS_ACUITY_SCORE: 49
ADLS_ACUITY_SCORE: 52

## 2025-07-24 NOTE — PLAN OF CARE
"Orientation: Alert and oriented x4. Able to make needs known to staff.   Bowel & Bladder: Continent of both bowel and bladder. Voids spontaneously without difficulty. LBM today.  Medications: Takes medication whole with thin liquids.  Pain: Reported headache. Pain managed with PRN Tylenolx1 which was effective.  Ambulation/Transfers: Assist-1 w/ walker and gait belt.  Diet: Reg Diet/Thin liquid.   Tubes/Lines/Drains: No line in place.   Oxygen: Supplemental oxygen via NC at 2LPM  Skin: scattered bruises.   Infection/Isolation: Droplet.  Other: Continue with POC.  Vital signs:  Temp: 97.9  F (36.6  C) Temp src: Oral BP: 104/68 Pulse: 53   Resp: 16 SpO2: 92 % (with activity; amb in hallway. Pt reporting 5/10 on dyspnea scale, with c/o mild \"weezing\") O2 Device: Nasal cannula Oxygen Delivery: 1 LPM   Weight: 72.2 kg (159 lb 3.2 oz)  Estimated body mass index is 24.93 kg/m  as calculated from the following:    Height as of 6/30/25: 1.702 m (5' 7\").    Weight as of this encounter: 72.2 kg (159 lb 3.2 oz).      "

## 2025-07-24 NOTE — PLAN OF CARE
Goal Outcome Evaluation:      Plan of Care Reviewed With: patient    Overall Patient Progress: no change    Pt.AOx4. Able to make needs known. Pt requested PRN Melatonin and Robitussin at night, effective. Pt able to sleep overnight w/o any issues. Call light w/in reach. Continue POC.       Patient's most recent vital signs are:     Vital signs:  BP: 102/64  Temp: 97.6  HR: 55  RR: 16  SpO2: 97 %     Patient does not have new respiratory symptoms.  Patient does not have new sore throat.  Patient does not have a fever greater than 99.5.

## 2025-07-24 NOTE — PROGRESS NOTES
"   07/24/25 111   Appointment Info   Signing Clinician's Name / Credentials (PT) Olimpia Lunsford PTA   PT Assistant Visit Number 1   Therapeutic Procedure/Exercise   Ther. Procedure: strength, endurance, ROM, flexibillity Minutes (29570) 45   Treatment Detail/Skilled Intervention Focus on CP/CV endurance and functional strengthening. Pt amb [id number] using FWW with SBA. Pt amb 20ft w/o AD with CGA, mild instability but no overt LOB.  Pt reporting mild dyspnea with distance gait that improves with seated therapeutic rest.  Pt navigates 2x4 (6\") stairs using 1 rail with CGA. Nu step LE only seat 9. Lv1 x12 minutes. Goal for 45-50 av spm. SpO2 >92% with activity on 1L NC. Pt reporting 5/10 on effort scale by end of session. Seated therapeutic rests between sets or activities for fatigue and dyspnea management.   PT Discharge Planning   PT Plan trial 4WW vs gait w/o AD, daily stairs daily, functional strength and endurance,  monitor O2   Physical Therapy Time and Intention   Timed Code Treatment Minutes 45   Total Session Time (sum of timed and untimed services) 45   PT - Transitional Care Unit Time   Individual Time (minutes) - PT 45   TCU Total Session Time (minutes) - PT 45   TCU Daily Total Session Time   PT TCU Daily Total Session Time 45   Rehab TCU Daily Total Session Time 98   1 Step (curb) - Ability to go up/down 1 step/curb.   Patient Performance Steadying Assist   Describe Performance PT: CGA with HHA       "

## 2025-07-25 ENCOUNTER — APPOINTMENT (OUTPATIENT)
Dept: PHYSICAL THERAPY | Facility: SKILLED NURSING FACILITY | Age: 65
DRG: 948 | End: 2025-07-25
Attending: INTERNAL MEDICINE
Payer: COMMERCIAL

## 2025-07-25 ENCOUNTER — APPOINTMENT (OUTPATIENT)
Dept: OCCUPATIONAL THERAPY | Facility: SKILLED NURSING FACILITY | Age: 65
DRG: 948 | End: 2025-07-25
Attending: INTERNAL MEDICINE
Payer: COMMERCIAL

## 2025-07-25 PROCEDURE — 97130 THER IVNTJ EA ADDL 15 MIN: CPT | Mod: GO

## 2025-07-25 PROCEDURE — 250N000013 HC RX MED GY IP 250 OP 250 PS 637: Performed by: INTERNAL MEDICINE

## 2025-07-25 PROCEDURE — 97530 THERAPEUTIC ACTIVITIES: CPT | Mod: GP

## 2025-07-25 PROCEDURE — 97129 THER IVNTJ 1ST 15 MIN: CPT | Mod: GO

## 2025-07-25 PROCEDURE — 97535 SELF CARE MNGMENT TRAINING: CPT | Mod: GO

## 2025-07-25 PROCEDURE — 250N000012 HC RX MED GY IP 250 OP 636 PS 637: Performed by: PHYSICIAN ASSISTANT

## 2025-07-25 PROCEDURE — 120N000009 HC R&B SNF

## 2025-07-25 PROCEDURE — 250N000011 HC RX IP 250 OP 636: Performed by: INTERNAL MEDICINE

## 2025-07-25 PROCEDURE — 250N000012 HC RX MED GY IP 250 OP 636 PS 637: Performed by: INTERNAL MEDICINE

## 2025-07-25 RX ADMIN — ISOSORBIDE MONONITRATE 60 MG: 60 TABLET, EXTENDED RELEASE ORAL at 08:23

## 2025-07-25 RX ADMIN — ROSUVASTATIN CALCIUM 20 MG: 10 TABLET, FILM COATED ORAL at 08:22

## 2025-07-25 RX ADMIN — MYCOPHENOLATE MOFETIL 500 MG: 250 CAPSULE ORAL at 20:53

## 2025-07-25 RX ADMIN — GUAIFENESIN AND DEXTROMETHORPHAN 10 ML: 100; 10 SYRUP ORAL at 08:21

## 2025-07-25 RX ADMIN — ENOXAPARIN SODIUM 40 MG: 40 INJECTION SUBCUTANEOUS at 08:22

## 2025-07-25 RX ADMIN — ENTECAVIR 0.5 MG: 0.5 TABLET ORAL at 08:24

## 2025-07-25 RX ADMIN — LATANOPROST 1 DROP: 50 SOLUTION/ DROPS OPHTHALMIC at 20:53

## 2025-07-25 RX ADMIN — FLUOXETINE HYDROCHLORIDE 60 MG: 40 CAPSULE ORAL at 08:24

## 2025-07-25 RX ADMIN — EMPAGLIFLOZIN 10 MG: 10 TABLET, FILM COATED ORAL at 08:23

## 2025-07-25 RX ADMIN — NALTREXONE HYDROCHLORIDE 50 MG: 50 TABLET, FILM COATED ORAL at 08:23

## 2025-07-25 RX ADMIN — FLUTICASONE FUROATE AND VILANTEROL TRIFENATATE 1 PUFF: 100; 25 POWDER RESPIRATORY (INHALATION) at 08:22

## 2025-07-25 RX ADMIN — PREDNISONE 5 MG: 5 TABLET ORAL at 08:24

## 2025-07-25 RX ADMIN — PANTOPRAZOLE SODIUM 40 MG: 40 TABLET, DELAYED RELEASE ORAL at 08:23

## 2025-07-25 RX ADMIN — ALBUTEROL SULFATE 2 PUFF: 90 AEROSOL, METERED RESPIRATORY (INHALATION) at 16:33

## 2025-07-25 RX ADMIN — ACETAMINOPHEN 650 MG: 325 TABLET, FILM COATED ORAL at 08:22

## 2025-07-25 RX ADMIN — Medication 1 TABLET: at 11:50

## 2025-07-25 RX ADMIN — MYCOPHENOLATE MOFETIL 500 MG: 250 CAPSULE ORAL at 08:23

## 2025-07-25 RX ADMIN — GUAIFENESIN AND DEXTROMETHORPHAN 10 ML: 100; 10 SYRUP ORAL at 16:17

## 2025-07-25 RX ADMIN — ASPIRIN 81 MG: 81 TABLET, DELAYED RELEASE ORAL at 08:24

## 2025-07-25 ASSESSMENT — ACTIVITIES OF DAILY LIVING (ADL)
ADLS_ACUITY_SCORE: 52

## 2025-07-25 NOTE — PLAN OF CARE
Goal Outcome Evaluation:      Plan of Care Reviewed With: patient    Overall Patient Progress: improvingOverall Patient Progress: improving    Orientation: Alert and Oriented x 4. Able to make needs known.   Bowel:Continent, per pt report LBM today.  Bladder: Continent uses urinal at bedside.  Ambulation/Transfers: A/1 with walker and gait belt.  Blood Sugars:None.   Diet/Liquids: Low Saturated fat sodium < 2400 mg diet.Thin liquids.   Tubes/Lines/Drains: None.  Oxygen: On oxygen at 2 LPM via NC. Denied SOB. Intermittent dry cough.  Skin: Intact.  Pain: Right hip pain managed with PRN Tylenol. For  cough PRN Guaifenesin given.   Other: Denied Chest pain, and N/V.ED follow up video appointment on 7/28/25.  Bed alarm on for safety, call light within reach. Continue with POC.      Patient's most recent vital signs are:     Vital signs:  BP: 106/59  Temp: 97.7  HR: 56  RR: 16  SpO2: 96 %     Patient does not have new respiratory symptoms.  Patient does not have new sore throat.  Patient does not have a fever greater than 99.5.

## 2025-07-25 NOTE — PROGRESS NOTES
CLINICAL NUTRITION SERVICES - ASSESSMENT NOTE    RECOMMENDATIONS FOR MDs/PROVIDERS TO ORDER:  None    Registered Dietitian Interventions:  Encouraged intakes  Ensure Plus High Protein daily    Future/Additional Recommendations:  Monitor labs, intakes, and weight trends.     REASON FOR ASSESSMENT  LOS    INFORMATION OBTAINED  Assessed patient in room..     RELEVANT MEDICAL HISTORY  Hx of NSTEMI (2014), FSGS s/p DDKT (1978, 1993), HTN, chronic anemia, PE, chronic HBV, COPD, who was admitted to [facility] 7/9 - 7/21/25 with acute hypoxic respiratory failure felt to be secondary to rhinovirus and newly diagnosed HFpEF with acute decompensation. Transferred to  TCU on 7/21/25 for physical rehabilitation.     NUTRITION HISTORY  Pt reports good appetite, eating well but not sure if he gets enough protein. Curious about how the restriction is managed in the kitchen. Explained that pt is allowed a certain amount of sodium/saturated fat per meal/day. Offered menu with more information, pt politely declined. Offered oral nutrition supplements, pt agreeable to one shake daily. Otherwise pt without questions or concerns related to nutrition.     CURRENT NUTRITION ORDERS  Diet: Low Saturated Fat/90739 mg Sodium    Snacks/Supplements: None currently ordered    CURRENT INTAKE/TOLERANCE  100% of documented meals over last 4 days    Pt ordering (on 2-day average) 1780 kcal and 57 g protein per day per [employer]. With documented and reported intakes, pt is likely meeting >75% minimum energy and protein needs.    LABS  Nutrition-relevant labs:    07/16/25 05:33 07/17/25 05:27 07/18/25 06:24 07/19/25 06:24 07/21/25 17:11 07/24/25 06:31   Sodium 135 132 (L) 133 (L) 135  139   Potassium 3.5 3.8 4.0 4.0  4.4   Urea Nitrogen 23.2 (H) 18.0 11.8 9.0  10.1   Creatinine 0.68 0.64 (L) 0.72 0.66 (L) 0.66 (L) 0.75   Glucose 85 85 85 82  82     MEDICATIONS  Nutrition-relevant medications: Citracal, Jardiance, entecavir, Thera-vit-M, mycophenolate,  "protonix, prednisone  IV Fluids over last 7 days: None    ANTHROPOMETRICS  Height: 170.2 cm (5' 7\")   Admission Weight: 71.6 kg (157 lb 12.8 oz) (07/22/25 0620)   Most Recent Weight: 72.2 kg (159 lb 3.2 oz) (07/24/25 1348)  IBW: 67.3 kg (107% IBW)  BMI: Body mass index is 24.93 kg/m .   Weight History: Pt with limited weight history prior to admission, with 12.5 lb (7%) weight loss over 1 month. - pt was on aggressive diuresis. Pt reports UBW of ~160.   07/24/25 72.2 kg (159 lb 3.2 oz)   07/23/25 71.9 kg (158 lb 8 oz)   07/22/25 71.6 kg (157 lb 12.8 oz)   07/21/25 69.3 kg (152 lb 12.8 oz)   06/30/25 77.9 kg (171 lb 12.8 oz)   11/06/24 75.8 kg (167 lb 3.2 oz)   10/03/23 75.2 kg (165 lb 11.2 oz)   07/05/23 78.6 kg (173 lb 3.2 oz)   06/30/23 80.1 kg (176 lb 8 oz)   06/27/23 82.1 kg (180 lb 14.4 oz)   06/19/23 80.7 kg (178 lb)   06/15/23 81.7 kg (180 lb 3.2 oz)   06/12/23 81.9 kg (180 lb 8 oz)     Dosing Weight: 72 kg - admission wt    ASSESSED NUTRITION NEEDS  Estimated Energy Needs: 2921-1434  kcal/day 25-30 kcal/kg  Justification: Maintenance  Estimated Protein Needs: 72-86+ grams protein/day (1 - 1.2 grams of pro/kg)  Justification: Increased needs  Estimated Fluid Needs: 1 mL/kcal or per provider pending fluid status   Justification: Maintenance    SYSTEM AND PHYSICAL FINDINGS    GI symptoms: Pt denies GI symptoms, LBM 7/24  Skin/wounds: No pressure injury indicated   Dentition: Patient with own teeth, no pain/difficulty chewing/swallowing    MALNUTRITION  % Intake: No decreased intake noted  % Weight Loss: Weight loss does not meet criteria, - likely fluid related   Subcutaneous Fat Loss: Triceps: Mild-moderate per visual exam  Muscle Loss: Globally moderate per visual exam  Fluid Accumulation/Edema: None noted  Malnutrition Diagnosis: Moderate malnutrition in the context of acute illness or injury  Malnutrition Present on Admission: Yes    NUTRITION DIAGNOSIS  Predicted inadequate protein-energy intake related to " variable appetite as evidenced by pt reliant on PO intakes to meet 100% of nutritional needs with potential for variation    INTERVENTIONS  Nutrition counseling strategies  Medical food supplement therapy    GOALS  Patient to consume % of nutritionally adequate meal trays TID, or the equivalent with supplements/snacks.     MONITORING/EVALUATION  Progress toward goals will be monitored and evaluated per policy.    Yesi Bee MS, RDN, LD  TCU/OB/Ortho Clinical Dietitian  Available via phone and Vocera  Phone: [phone]  Vocera: TCU Clinical Dietitian  Weekend/Holiday Vocera: Weekend Holiday Clinical Dietitian [Multi Site Groups]

## 2025-07-25 NOTE — PLAN OF CARE
VS: /59 (BP Location: Right arm)   Pulse 56   Temp 97.7  F (36.5  C) (Oral)   Resp 16   Wt 72.2 kg (159 lb 3.2 oz)   SpO2 96%   BMI 24.93 kg/m       O2:  96% w/ 1.5L of O2 via NC   Output:  Continent B/B   Last BM:  7/24/2025   Activity: Assist X1   ,w/ walker & GB    Pain: 4 out of 10 PRN Tylenol given    CMS: A&O x 4   Skin & Dressing &LDA No lines  Skin intact   Diet: Regular, pills whole with thin liquids   Equipment: Personal belongings, walker, GB   Plan: Will continue to follow POC    Additional Info: Pt is able to make needs known.   Denies CP, SOB, and N/V.     0726-8126: PRN Tylenol, melatonin and Robitussin given per pt's request. Pt appears sleeping and resting well in this shift. Droplet precaution maintains.     No acute issues this shift. Call light within reach.

## 2025-07-25 NOTE — PLAN OF CARE
Goal Outcome Evaluation:      Plan of Care Reviewed With: patient    Overall Patient Progress: improvingOverall Patient Progress: improving    Orientation: Alert and Oriented x 4. Able to make needs known.   Bowel: Continent , LBM Today.  Bladder: Continent , uses urinal at bed side.  Ambulation/Transfers: Assist of one person with walker and gait belt.  Blood Sugars: None.  Diet/Liquids: Low Saturated Fat Sodium < 2400 mg diet. , thin liquids.  Tubes/Lines/Drains: None.  Oxygen: On supplemental Oxygen 2 LPM via NC. C/o SOB and wheezing offered PRN  albuterol . O2 sat's was 97%.   Skin: Intact.  Pain: Denied   Offered PRN Guaifenesin for cough. Droplet precautions maintained. Denied chest pain, N/V.   Bed alarm on for safety, call light within reach. Continue with POC.      Patient's most recent vital signs are:     Vital signs:  BP: 99/61  Temp: 97.6  HR: 62  RR: 16  SpO2: 98 %     Patient does not have new respiratory symptoms.  Patient does not have new sore throat.  Patient does not have a fever greater than 99.5.

## 2025-07-25 NOTE — PLAN OF CARE
Orientation: Alert and oriented x4. Able to make needs known to staff.   Bowel & Bladder: Continent of both bowel and bladder. Voids spontaneously without difficulty. LBM today.  Medications: Takes medication whole with thin liquids.  Pain: Reported headache. Pain managed with PRN Tylenolx1 which was effective.  Ambulation/Transfers: Assist-1 w/ walker and gait belt.  Diet: Reg Diet/Thin liquid.   Tubes/Lines/Drains: No line in place.   Oxygen: Supplemental oxygen via NC at 1.5LPM. PRN cough Meds given for int dry cough.  Skin: scattered bruises.   Infection/Isolation: Droplet.  Other: Continue with POC

## 2025-07-26 ENCOUNTER — APPOINTMENT (OUTPATIENT)
Dept: OCCUPATIONAL THERAPY | Facility: SKILLED NURSING FACILITY | Age: 65
DRG: 948 | End: 2025-07-26
Attending: INTERNAL MEDICINE
Payer: COMMERCIAL

## 2025-07-26 PROCEDURE — 97535 SELF CARE MNGMENT TRAINING: CPT | Mod: GO

## 2025-07-26 PROCEDURE — 250N000013 HC RX MED GY IP 250 OP 250 PS 637: Performed by: PHYSICIAN ASSISTANT

## 2025-07-26 PROCEDURE — 250N000012 HC RX MED GY IP 250 OP 636 PS 637: Performed by: PHYSICIAN ASSISTANT

## 2025-07-26 PROCEDURE — 250N000013 HC RX MED GY IP 250 OP 250 PS 637: Performed by: INTERNAL MEDICINE

## 2025-07-26 PROCEDURE — 250N000011 HC RX IP 250 OP 636: Performed by: INTERNAL MEDICINE

## 2025-07-26 PROCEDURE — 250N000012 HC RX MED GY IP 250 OP 636 PS 637: Performed by: INTERNAL MEDICINE

## 2025-07-26 PROCEDURE — 120N000009 HC R&B SNF

## 2025-07-26 RX ADMIN — ACETAMINOPHEN 650 MG: 325 TABLET, FILM COATED ORAL at 08:05

## 2025-07-26 RX ADMIN — GUAIFENESIN AND DEXTROMETHORPHAN 10 ML: 100; 10 SYRUP ORAL at 08:04

## 2025-07-26 RX ADMIN — FLUOXETINE HYDROCHLORIDE 60 MG: 40 CAPSULE ORAL at 08:06

## 2025-07-26 RX ADMIN — FLUTICASONE FUROATE AND VILANTEROL TRIFENATATE 1 PUFF: 100; 25 POWDER RESPIRATORY (INHALATION) at 08:03

## 2025-07-26 RX ADMIN — MYCOPHENOLATE MOFETIL 500 MG: 250 CAPSULE ORAL at 08:07

## 2025-07-26 RX ADMIN — ENTECAVIR 0.5 MG: 0.5 TABLET ORAL at 08:05

## 2025-07-26 RX ADMIN — LATANOPROST 1 DROP: 50 SOLUTION/ DROPS OPHTHALMIC at 20:25

## 2025-07-26 RX ADMIN — ISOSORBIDE MONONITRATE 60 MG: 60 TABLET, EXTENDED RELEASE ORAL at 08:08

## 2025-07-26 RX ADMIN — GUAIFENESIN AND DEXTROMETHORPHAN 10 ML: 100; 10 SYRUP ORAL at 14:40

## 2025-07-26 RX ADMIN — ASPIRIN 81 MG: 81 TABLET, DELAYED RELEASE ORAL at 08:09

## 2025-07-26 RX ADMIN — PREDNISONE 5 MG: 5 TABLET ORAL at 08:07

## 2025-07-26 RX ADMIN — PANTOPRAZOLE SODIUM 40 MG: 40 TABLET, DELAYED RELEASE ORAL at 08:09

## 2025-07-26 RX ADMIN — ACETAMINOPHEN 650 MG: 325 TABLET, FILM COATED ORAL at 14:41

## 2025-07-26 RX ADMIN — Medication 1 TABLET: at 12:20

## 2025-07-26 RX ADMIN — Medication 5 MG: at 22:41

## 2025-07-26 RX ADMIN — NALTREXONE HYDROCHLORIDE 50 MG: 50 TABLET, FILM COATED ORAL at 08:09

## 2025-07-26 RX ADMIN — ROSUVASTATIN CALCIUM 20 MG: 10 TABLET, FILM COATED ORAL at 08:07

## 2025-07-26 RX ADMIN — EMPAGLIFLOZIN 10 MG: 10 TABLET, FILM COATED ORAL at 08:07

## 2025-07-26 RX ADMIN — Medication 12.5 MG: at 08:07

## 2025-07-26 RX ADMIN — ENOXAPARIN SODIUM 40 MG: 40 INJECTION SUBCUTANEOUS at 08:09

## 2025-07-26 RX ADMIN — MYCOPHENOLATE MOFETIL 500 MG: 250 CAPSULE ORAL at 20:25

## 2025-07-26 ASSESSMENT — ACTIVITIES OF DAILY LIVING (ADL)
ADLS_ACUITY_SCORE: 52

## 2025-07-26 NOTE — PLAN OF CARE
Orientation: Alert and oriented x4. Able to make needs known to staff.   Bowel & Bladder: Continent of both bowel and bladder. Voids spontaneously without difficulty. LBM today.  Medications: Takes medication whole with thin liquids.  Pain: Reported headache. Pain managed with PRN Tylenolx1 which was effective.  Ambulation/Transfers: Assist-1 w/ walker and gait belt.  Diet: Reg Diet/Thin liquid.   Tubes/Lines/Drains: No line in place.   Oxygen: Supplemental oxygen via NC at 2 LPM. PRN cough Meds given for int dry cough.  Skin: scattered bruises.   Infection/Isolation: Droplet.  Other: Continue with POC

## 2025-07-26 NOTE — PLAN OF CARE
Goal Outcome Evaluation:    Plan of Care Reviewed With: patient    Overall Patient Progress: no change    Pt alert and oriented x4, appeared resting during rounds/between cares. Remains on 2L O2 via NC. No complaints of SOB or chest pain. No acute issues, will continue with plan of care.

## 2025-07-27 ENCOUNTER — APPOINTMENT (OUTPATIENT)
Dept: PHYSICAL THERAPY | Facility: SKILLED NURSING FACILITY | Age: 65
DRG: 948 | End: 2025-07-27
Attending: INTERNAL MEDICINE
Payer: COMMERCIAL

## 2025-07-27 LAB
CREAT SERPL-MCNC: 0.66 MG/DL (ref 0.67–1.17)
EGFRCR SERPLBLD CKD-EPI 2021: >90 ML/MIN/1.73M2

## 2025-07-27 PROCEDURE — 97530 THERAPEUTIC ACTIVITIES: CPT | Mod: GP

## 2025-07-27 PROCEDURE — 250N000011 HC RX IP 250 OP 636: Performed by: INTERNAL MEDICINE

## 2025-07-27 PROCEDURE — 250N000012 HC RX MED GY IP 250 OP 636 PS 637: Performed by: INTERNAL MEDICINE

## 2025-07-27 PROCEDURE — 250N000013 HC RX MED GY IP 250 OP 250 PS 637: Performed by: INTERNAL MEDICINE

## 2025-07-27 PROCEDURE — 022N000001 HC SNF RUG CODE OPNP

## 2025-07-27 PROCEDURE — 250N000012 HC RX MED GY IP 250 OP 636 PS 637: Performed by: PHYSICIAN ASSISTANT

## 2025-07-27 PROCEDURE — 36415 COLL VENOUS BLD VENIPUNCTURE: CPT | Performed by: INTERNAL MEDICINE

## 2025-07-27 PROCEDURE — 250N000013 HC RX MED GY IP 250 OP 250 PS 637: Performed by: PHYSICIAN ASSISTANT

## 2025-07-27 PROCEDURE — 99309 SBSQ NF CARE MODERATE MDM 30: CPT | Performed by: PHYSICIAN ASSISTANT

## 2025-07-27 PROCEDURE — 82565 ASSAY OF CREATININE: CPT | Performed by: INTERNAL MEDICINE

## 2025-07-27 PROCEDURE — 120N000009 HC R&B SNF

## 2025-07-27 RX ADMIN — FLUTICASONE FUROATE AND VILANTEROL TRIFENATATE 1 PUFF: 100; 25 POWDER RESPIRATORY (INHALATION) at 08:25

## 2025-07-27 RX ADMIN — ROSUVASTATIN CALCIUM 20 MG: 10 TABLET, FILM COATED ORAL at 08:25

## 2025-07-27 RX ADMIN — EMPAGLIFLOZIN 10 MG: 10 TABLET, FILM COATED ORAL at 08:26

## 2025-07-27 RX ADMIN — FLUOXETINE HYDROCHLORIDE 60 MG: 40 CAPSULE ORAL at 08:26

## 2025-07-27 RX ADMIN — ACETAMINOPHEN 650 MG: 325 TABLET, FILM COATED ORAL at 08:26

## 2025-07-27 RX ADMIN — ENOXAPARIN SODIUM 40 MG: 40 INJECTION SUBCUTANEOUS at 08:25

## 2025-07-27 RX ADMIN — PREDNISONE 5 MG: 5 TABLET ORAL at 08:27

## 2025-07-27 RX ADMIN — Medication 1 TABLET: at 11:41

## 2025-07-27 RX ADMIN — ISOSORBIDE MONONITRATE 60 MG: 60 TABLET, EXTENDED RELEASE ORAL at 08:26

## 2025-07-27 RX ADMIN — MYCOPHENOLATE MOFETIL 500 MG: 250 CAPSULE ORAL at 20:41

## 2025-07-27 RX ADMIN — LATANOPROST 1 DROP: 50 SOLUTION/ DROPS OPHTHALMIC at 20:42

## 2025-07-27 RX ADMIN — GUAIFENESIN AND DEXTROMETHORPHAN 10 ML: 100; 10 SYRUP ORAL at 20:41

## 2025-07-27 RX ADMIN — NALTREXONE HYDROCHLORIDE 50 MG: 50 TABLET, FILM COATED ORAL at 08:27

## 2025-07-27 RX ADMIN — ASPIRIN 81 MG: 81 TABLET, DELAYED RELEASE ORAL at 08:27

## 2025-07-27 RX ADMIN — Medication 12.5 MG: at 08:26

## 2025-07-27 RX ADMIN — GUAIFENESIN AND DEXTROMETHORPHAN 10 ML: 100; 10 SYRUP ORAL at 08:25

## 2025-07-27 RX ADMIN — Medication 5 MG: at 21:49

## 2025-07-27 RX ADMIN — PANTOPRAZOLE SODIUM 40 MG: 40 TABLET, DELAYED RELEASE ORAL at 08:27

## 2025-07-27 RX ADMIN — ACETAMINOPHEN 650 MG: 325 TABLET, FILM COATED ORAL at 11:41

## 2025-07-27 RX ADMIN — MYCOPHENOLATE MOFETIL 500 MG: 250 CAPSULE ORAL at 08:26

## 2025-07-27 RX ADMIN — ACETAMINOPHEN 650 MG: 325 TABLET, FILM COATED ORAL at 20:41

## 2025-07-27 RX ADMIN — ENTECAVIR 0.5 MG: 0.5 TABLET ORAL at 08:27

## 2025-07-27 ASSESSMENT — ACTIVITIES OF DAILY LIVING (ADL)
ADLS_ACUITY_SCORE: 52

## 2025-07-27 NOTE — PLAN OF CARE
Goal Outcome Evaluation:    Shift: [id number]    Alert and oriented x4. VSS, denied any untoward signs and symptoms. On O2 support at 2 Lpm via NC. Continent to both, uses urinal at bedside. Slept most of the time. Call light within reach. Safety measures in place. Will continue POC.

## 2025-07-27 NOTE — PROGRESS NOTES
"   07/27/25 1625   Appointment Info   Signing Clinician's Name / Credentials (PT) Juju Burgess PTA   PT Assistant Visit Number 2   Rehab Comments (PT) PT: Pt on RA upon arrival. stating \"Dr was here and said it was ok\".   Therapeutic Activity   Therapeutic Activities: dynamic activities to improve functional performance Minutes (84946) 30   Symptoms Noted During/After Treatment Fatigue   Treatment Detail/Skilled Intervention PT: upon arrival, Pt supine in bed w/o NC, stating \"Dr was just in here and said I didn't need it\". trial of titration of supplemental O2 with functional activities. see vitals flow sheet. Pt reporting dizziness w/position changes, monitored BP, vitals flow sheet. Attempted assessing for mod I in room w/4WW w/cone finding activity. Placed cones in drawer, high/low spaces and hard to reach spaces. Pt able to retrieve 5 of 7 before needing to return to supine d/t increasing dizziness. Noting SpO2 on RA dropped to 87%.applied 1L supplemental O2 via NC and Pt returned to greater than 90% within ~30 sec. Pt agreeable to continue assessment in tomorrow's session.   PT Discharge Planning   PT Plan PT: needs O2 walk test on 7/29. wean supplemental O2 as able with functional activities after verifying titration w/provider. continue working towards mod I in room using cone finding activity. Better with FWW than 4WW? Pt does not have one and is not fond of 4WW.   PT Discharge Recommendation (DC Rec) home;home with home care physical therapy   PT Rationale for DC Rec PT: Pt lives alone but has room mate   PT Brief overview of current status amb up to 150ft w/FWW, supervsion. IND for bed mobility, supervision for transfers   Physical Therapy Time and Intention   Timed Code Treatment Minutes 30   Total Session Time (sum of timed and untimed services) 30   Post Acute Settings Only   What unit is patient on? TCU   PT - Transitional Care Unit Time   Individual Time (minutes) - PT 30   Group Time (minutes) - PT " 0   Concurrent Time (minutes) - PT 0   Co-Treatment Time (minutes) - PT 0   TCU Total Session Time (minutes) - PT 30   TCU Daily Total Session Time   PT TCU Daily Total Session Time 30   Rehab TCU Daily Total Session Time 30

## 2025-07-27 NOTE — PLAN OF CARE
Orientation: Alert and oriented x4. Able to make needs known to staff.   Bowel & Bladder: Continent of both bowel and bladder. Voids spontaneously without difficulty. LBM today.  Medications: Takes medication whole with thin liquids.  Pain: Reported headache. Pain managed with PRN [id number] was effective.  Ambulation/Transfers: Assist-1 w/ walker and gait belt.  Diet: Reg Diet/Thin liquid.   Tubes/Lines/Drains: No line in place.   Oxygen: Supplemental oxygen via NC at 2 LPM. PRN cough Meds given for int dry cough.  Skin: scattered bruises.   Infection/Isolation: Droplet.  Other: Refused shower. States that he is too tired.Continue with POC

## 2025-07-27 NOTE — PROGRESS NOTES
"  Physical Therapy Progress Note       Status at Admission - 7/22/25 Status at Last Update -  Current Status - 7/27/25   Bed Mobility Rolling L/R: IND  Sup to/from sit: IND  unchanged   Transfer STS/SPT, CGA with front WW  STS/SPT, 4WW, supervision   Gait Up to 130ft, FWW, SBA with w/c brought behind for seated rest breaks only  Amb 150ft x 2, Front WW, SBA. Amb 20ft no right ear, CGA due to mild instability w/o LOB. Needing 1L - 2L O2 via NC w/activity to maintain SpO2 of >90%.    Stairs Ascend/descend 12 - 6\" steps, B HR, min A, needing 2L O2 via NC throughout  unchanged   Wheelchair Propulsion Not applicable  Not applicable   Outcomes Measures Not performed  Not performed          Assessment of Progress/Current Status: Pt displaying good progress towards functional independence as noted above with slowly decreasing O2 requirements w/functional activities. Currently working towards mod I in room w/4WW or FWW, strength/endurance/balance, decreasing reliance on supplemental O2 and safety w/soft blood pressure readings as Pt lives alone.   Remaining Barriers to Discharge: Pt lives alone with limited support, continued supplemental O2 requirements and soft blood pressures causing dizziness w/upright activity.   Justification for Continued Therapy Services: Without further skilled therapy, Pt will be unable to safely discharge home and be at increased risk for hospitalization and increased burden of care. Currently working on safe functional movement in Pt's room to simulate home environment, progress strength and endurance to perform functional activities to safely discharge home alone.   Equipment/Caregiver Training Needs: May need front WW or 4WW   Other:                 "

## 2025-07-27 NOTE — PLAN OF CARE
Discharge Planner Post-Acute Rehab OT:     Discharge Plan: home alone (tenant that rents room but not a friend/caregiver), home care OT    Precautions: droplet, falls, NC oxygen    Current Status:  ADLs:  Mobility: modified indep bed mob, sba w/ fww or 4ww for room mob w/ nc oxygen  Grooming: modified indep from sitting  Dressing: modified indep ub drg, sba LB drg w/ fww due to weakness/SOB affecting standing bal and tolerance  Bathing: cga w/ tranfers to shower/bench w/ grabbar and fww, setu/sba w/ bathing w/ hand held shower in sitting on bench/standing for pericares w/ grabbar and cues for implementing resp rehab techniques  Toileting:sba, grabbar, 4WW or fww, 18 in toilet  IADLs: TBA  Vision/Cognition: mild memory defictis, pt has awarness and implements compensatory strategies    Assessment: pt improving w/ adls/functional mob, continues to need nc oxygen w/ goal per Provider and pt to wean off oxygen if possible, pt was not on oxygen at baseline, recommend continued resp rehab educ and EC , conditioning activities for ease/safety w/ adls/mob/iadls due to pt lives alone and needs to be indp, assess for readiness for modified indep in room/bathroom w/ asssist device.     Other Barriers to Discharge (DME, Family Training, etc): antic pt may benefit from commode overlay or RTS at d/c due to pt has std ht toilet and no grabbar, pt has tub/shower comb w/ grabbars and ext tub bench, FWW and reacher at home, provided pt handout for RTS w/ handles and bassic commode for commode overlay on toilet, pt reports having somewhere at home toilet safety frame but it does not assist w/ ht of toilet

## 2025-07-27 NOTE — PLAN OF CARE
Goal Outcome Evaluation:      Plan of Care Reviewed With: patient    Overall Patient Progress: improvingOverall Patient Progress: improving    Orientation: Alert and oriented x 4. Able to make needs known.   Bowel: Continent , per pt report LBM today   Bladder:Continent , uses urinal at bedside.  Ambulation/Transfers: A/1 with walker and GB.  Blood Sugars: None.  Diet/Liquids: Low saturated Fat sodium < 2400 mg diet, thin liquids.  Tubes/Lines/Drains:None   Oxygen: On supplemental Oxygen 2 LPM via NC. Denied SOB.   Skin: Intact.  Pain: Denied.  Denied chest pain, N/V .   Bed alarm on for safety, call light within reach. Continue with POC.      Patient's most recent vital signs are:     Vital signs:  BP: 99/61  Temp: 97.6  HR: 65  RR: 18  SpO2: 95 %     Patient does not have new respiratory symptoms.  Patient does not have new sore throat.  Patient does not have a fever greater than 99.5.

## 2025-07-27 NOTE — PROGRESS NOTES
[facility]  Internal Medicine Progress Note    TCU Day # 6    Assessment & Plan:   Jerad Ross is a 65 year old man with a history of NSTEMI (2014), FSGS s/p DDKT (1978, 1993), HTN, chronic anemia, PE, chronic HBV, COPD, who was admitted to [facility] 7/9 - 7/21/25 with acute hypoxic respiratory failure felt to be secondary to rhinovirus and newly diagnosed HFpEF with acute decompensation. Transferred to  TCU on 7/21/25 for physical rehabilitation.      Physical deconditioning: PT, OT following.     Acute hypoxic respiratory failure  Rhinovirus infection  Possible COPD  Hx of COPD noted in chart but PFTs appeared more consistent with mild restrictive lung disease. Presented with dyspnea and hypoxia initially requiring BiPAP. CT chest revealed multifocal GGOs, negative for PE. RVP positive for rhinovirus infection. HFpEF exacerbation also contributing to AHRF, was diuresed with IV Lasix. TTE also revealed new LV diastolic dysfunction and he was diuresed with IV Lasix. O2 needs improving - turned him down to room air during my visit today.   - Breo Ellipta once daily   - Continue droplet precautions until symptoms resolved   - Ordered overnight oximetry testing for 7/28   - Will need O2 walk test on 7/29   - O2 PRN to maintain SpO2 >90%   - Consider repeat PFTs as outpatient   - Has PCP appointment on 7/28     HFpEF with acute decompensation  CAD s/p CABG  NSTEMI (2014)  New diagnosis. TTE 7/14  revealed grade I LV diastolic dysfunction, EF 55-65%. CXR with pulmonary edema. Diuresed with IV Lasix. EDW ~152 lbs. Currently appears euvolemic.    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 No acute issues.   - Prednisone 5 mg daily   - mycophenolate 500 mg BID  (reduced d/t rhinovirus). Increase back to  mg on 7/29   - Monitor BMP weekly       Other Stable Medical Issues:  Chronic HBV: Continue PTA entecavir.   Depression, anxiety: Continue fluoxetine, PRN hydroxyzine.   Insomnia: Melatonin 5 mg at bedtime PRN.   GERD: Continue PTA PPI.   Glaucoma: Continue PTA latanoprost drops.   Alcohol use disorder, in remission: Continue PTA naltrexone.   Hx of PE: Provoked after hip surgery in 8/2023. Not maintained on AC.  Hx of MITZI: Has visit with Dr. Bartholomew on 8/13.        CODE: DNR/DNI  Diet: Low sodium   DVT: Lovenox ppx while at TCU, discontinue on discharge  Indication for Psychotropic Medications: fluoxetine, hydroxyzine for MDD and KIM  Disposition: PT/OT through 7/29, home on 7/30      Linette Joshua PA-C  Hospitalist Service    ________________________________________________________________    Subjective & Interval History:  Doing well overall. Cold symptoms improving. Still has a dry cough but is getting some relief with Robitussin. Cough is not prohibitive of sleep. Would like to be more aggressive in weaning his oxygen.    Last 24 hour care team notes reviewed.   ROS: 4 point ROS (including Respiratory, CV, GI and ) was performed and negative unless otherwise noted in HPI.     Medications: Reviewed in EPIC.    Physical Exam:    Blood pressure 115/68, pulse 57, temperature 97.7  F (36.5  C), temperature source Oral, resp. rate 18, weight (P) 71.2 kg (156 lb 14.4 oz), SpO2 97%.    Constitutional: Awake and alert, in no apparent distress. Sitting up comfortably in bed.   Eyes: Sclera clear, anicteric   Respiratory: Breathing non-labored. CTAB  Cardiovascular:  RRR, normal S1/S2. No rubs or murmurs.  No peripheral edema.   GI: Soft, non-tender, non-distended. No palpable masses or hepatomegaly. Normoactive bowel sounds.   Skin:  Good color. No jaundice. No visible rashes, lesions, or bruising of concern.   Neurologic: Alert and oriented          Lines/Tubes/Drains:

## 2025-07-28 ENCOUNTER — APPOINTMENT (OUTPATIENT)
Dept: OCCUPATIONAL THERAPY | Facility: SKILLED NURSING FACILITY | Age: 65
DRG: 948 | End: 2025-07-28
Attending: INTERNAL MEDICINE
Payer: COMMERCIAL

## 2025-07-28 ENCOUNTER — APPOINTMENT (OUTPATIENT)
Dept: PHYSICAL THERAPY | Facility: SKILLED NURSING FACILITY | Age: 65
DRG: 948 | End: 2025-07-28
Attending: INTERNAL MEDICINE
Payer: COMMERCIAL

## 2025-07-28 ENCOUNTER — VIRTUAL VISIT (OUTPATIENT)
Dept: INTERNAL MEDICINE | Facility: CLINIC | Age: 65
End: 2025-07-28
Payer: COMMERCIAL

## 2025-07-28 ENCOUNTER — TELEPHONE (OUTPATIENT)
Dept: GASTROENTEROLOGY | Facility: CLINIC | Age: 65
End: 2025-07-28

## 2025-07-28 DIAGNOSIS — J96.01 ACUTE HYPOXIC RESPIRATORY FAILURE (H): Primary | ICD-10-CM

## 2025-07-28 LAB
ANION GAP SERPL CALCULATED.3IONS-SCNC: 11 MMOL/L (ref 7–15)
BUN SERPL-MCNC: 32.8 MG/DL (ref 8–23)
CALCIUM SERPL-MCNC: 8.9 MG/DL (ref 8.8–10.4)
CHLORIDE SERPL-SCNC: 102 MMOL/L (ref 98–107)
CREAT SERPL-MCNC: 0.61 MG/DL (ref 0.67–1.17)
EGFRCR SERPLBLD CKD-EPI 2021: >90 ML/MIN/1.73M2
GLUCOSE SERPL-MCNC: 100 MG/DL (ref 70–99)
HCO3 SERPL-SCNC: 24 MMOL/L (ref 22–29)
HOLD SPECIMEN: NORMAL
POTASSIUM SERPL-SCNC: 3.8 MMOL/L (ref 3.4–5.3)
SODIUM SERPL-SCNC: 137 MMOL/L (ref 135–145)

## 2025-07-28 PROCEDURE — 250N000013 HC RX MED GY IP 250 OP 250 PS 637: Performed by: PHYSICIAN ASSISTANT

## 2025-07-28 PROCEDURE — 250N000013 HC RX MED GY IP 250 OP 250 PS 637: Performed by: INTERNAL MEDICINE

## 2025-07-28 PROCEDURE — 120N000009 HC R&B SNF

## 2025-07-28 PROCEDURE — 36415 COLL VENOUS BLD VENIPUNCTURE: CPT | Performed by: INTERNAL MEDICINE

## 2025-07-28 PROCEDURE — 250N000011 HC RX IP 250 OP 636: Performed by: INTERNAL MEDICINE

## 2025-07-28 PROCEDURE — 97530 THERAPEUTIC ACTIVITIES: CPT | Mod: GP

## 2025-07-28 PROCEDURE — 250N000012 HC RX MED GY IP 250 OP 636 PS 637: Performed by: INTERNAL MEDICINE

## 2025-07-28 PROCEDURE — 98005 SYNCH AUDIO-VIDEO EST LOW 20: CPT | Performed by: INTERNAL MEDICINE

## 2025-07-28 PROCEDURE — 97535 SELF CARE MNGMENT TRAINING: CPT | Mod: GO

## 2025-07-28 PROCEDURE — 80048 BASIC METABOLIC PNL TOTAL CA: CPT | Performed by: INTERNAL MEDICINE

## 2025-07-28 PROCEDURE — 250N000012 HC RX MED GY IP 250 OP 636 PS 637: Performed by: PHYSICIAN ASSISTANT

## 2025-07-28 RX ADMIN — EMPAGLIFLOZIN 10 MG: 10 TABLET, FILM COATED ORAL at 08:27

## 2025-07-28 RX ADMIN — Medication 5 MG: at 20:27

## 2025-07-28 RX ADMIN — Medication 12.5 MG: at 08:27

## 2025-07-28 RX ADMIN — MYCOPHENOLATE MOFETIL 500 MG: 250 CAPSULE ORAL at 08:27

## 2025-07-28 RX ADMIN — ASPIRIN 81 MG: 81 TABLET, DELAYED RELEASE ORAL at 08:27

## 2025-07-28 RX ADMIN — Medication 1 TABLET: at 12:23

## 2025-07-28 RX ADMIN — FLUTICASONE FUROATE AND VILANTEROL TRIFENATATE 1 PUFF: 100; 25 POWDER RESPIRATORY (INHALATION) at 08:25

## 2025-07-28 RX ADMIN — GUAIFENESIN AND DEXTROMETHORPHAN 10 ML: 100; 10 SYRUP ORAL at 12:23

## 2025-07-28 RX ADMIN — PREDNISONE 5 MG: 5 TABLET ORAL at 08:35

## 2025-07-28 RX ADMIN — ENTECAVIR 0.5 MG: 0.5 TABLET ORAL at 08:27

## 2025-07-28 RX ADMIN — PANTOPRAZOLE SODIUM 40 MG: 40 TABLET, DELAYED RELEASE ORAL at 08:28

## 2025-07-28 RX ADMIN — ISOSORBIDE MONONITRATE 60 MG: 60 TABLET, EXTENDED RELEASE ORAL at 08:28

## 2025-07-28 RX ADMIN — ROSUVASTATIN CALCIUM 20 MG: 10 TABLET, FILM COATED ORAL at 08:27

## 2025-07-28 RX ADMIN — MYCOPHENOLATE MOFETIL 500 MG: 250 CAPSULE ORAL at 20:26

## 2025-07-28 RX ADMIN — LATANOPROST 1 DROP: 50 SOLUTION/ DROPS OPHTHALMIC at 20:27

## 2025-07-28 RX ADMIN — ENOXAPARIN SODIUM 40 MG: 40 INJECTION SUBCUTANEOUS at 08:26

## 2025-07-28 RX ADMIN — NALTREXONE HYDROCHLORIDE 50 MG: 50 TABLET, FILM COATED ORAL at 08:28

## 2025-07-28 RX ADMIN — FLUOXETINE HYDROCHLORIDE 60 MG: 40 CAPSULE ORAL at 08:27

## 2025-07-28 ASSESSMENT — ACTIVITIES OF DAILY LIVING (ADL)
ADLS_ACUITY_SCORE: 51
ADLS_ACUITY_SCORE: 52
ADLS_ACUITY_SCORE: 48
ADLS_ACUITY_SCORE: 52
ADLS_ACUITY_SCORE: 48
ADLS_ACUITY_SCORE: 51
ADLS_ACUITY_SCORE: 48
ADLS_ACUITY_SCORE: 51
ADLS_ACUITY_SCORE: 52
ADLS_ACUITY_SCORE: 52
ADLS_ACUITY_SCORE: 51
ADLS_ACUITY_SCORE: 48
ADLS_ACUITY_SCORE: 51
ADLS_ACUITY_SCORE: 48
ADLS_ACUITY_SCORE: 48
ADLS_ACUITY_SCORE: 51

## 2025-07-28 NOTE — PROGRESS NOTES
7/25/25  TCU Notice of Medicare Non-Coverage Note:     Met with patient to Introduce self and role.     Discussed Notice of Medicare Non-Coverage and last day of coverage as required for all patients with Medicare coverage during Skilled Nursing Facility (SNF) stays.Informed patient/family that Medicare covers stay until midnight of day before discharge. TCU does not bill for discharge date.    Last coverage day is 7/29/25. Discharge date expected 7/30/25.    Opportunity provided for questions and education provided regarding potential financial impact to patient.     Patient verbalize(s) understanding of discussion.     7/28/25  SW was updated by therapy that they want to extend until 7/31/25.  SW will rescind the NOMNC and reissue tomorrow.     ESTEE Euceda   [facility], Transitional Care Unit   Social Work   [facility] [address], [address] [place] [address]  () [phone]

## 2025-07-28 NOTE — PROGRESS NOTES
Jerad is a 65 year old who is being evaluated via a billable telephone visit.  This was converted from a video visit to a phone visit given lack of available video connection.          Subjective   Jerad is a 65 year old, presenting for the following health issues:  metro mobility paperwork; hospital follow up    Jerad made this appointment to discuss metro mobility paperwork. I went through the four page form with him and asked the appropriate questions regarding available transportation, ongoing physical disability, and the need for metro mobility services.      He is also getting physical therapy for deconditioning after being hospitalized for pneumonia.  Had labs drawn this morning and I reviewed those results with him, his glucose was 100 which is fine, his creatinine and other electrolytes mostly normal.  He has completed antibiotics but is still requiring supplemental O2.          Objective       Vitals:  No vitals were obtained today due to virtual visit.    Physical Exam   RESP: No audible wheeze, cough, or respiratory distress; able to complete full sentences  PSYCH: Appropriate affect, tone, and pace of words    A/P: 65 year old male here to complete metro mobility paperwork.      Advised also, a hospital discharge appointment with his usual primary (Dr. Perry) in the next couple of weeks.    Sarahy Hinkle MD          Telephone visit details: duration 16 minutes with another 10 minutes chart review and documentation same day

## 2025-07-28 NOTE — PLAN OF CARE
Goal Outcome Evaluation:    Orientation: Alert and oriented x4. Able to make needs known to staff.   Bowel & Bladder: Continent of both bowel and bladder. Voids spontaneously without difficulty. Uses urinal independently.   Medications: Takes medication whole with thin liquids.  Pain: None during shift   Ambulation/Transfers: Moderately Independent w/ walker.  Diet: Regular diet.   Tubes/Lines/Drains: None  Oxygen: Supplemental oxygen via NC at 1LPM, pt was initially at 0.5 LPM but requested to be at 1 LPM at bedtime.   Skin: Moles on BUE and BLE, scab on left arm.  Infection/Isolation: Droplet.  Other: Call-light within reach. Calls appropriately. Continue with POC.

## 2025-07-28 NOTE — PLAN OF CARE
Goal Outcome Evaluation:      Plan of Care Reviewed With: patient    Overall Patient Progress: no changeOverall Patient Progress: no change     Overall Pt had no acute issue this shift. Pt is alert and oriented x 4. Able to make needs known to staffs. Pt denied having chest pain, SOB, N/V, fever and chills. Requested and received PRN Tylenol and Guaifenesin. Stated medications were effective. Pt is on oxygen this shift. Saturation above 90 through out the shift. No complain at this time. Will continue with POC

## 2025-07-28 NOTE — PLAN OF CARE
Goal Outcome Evaluation:      Plan of Care Reviewed With: patient             VS: /65 (BP Location: Right arm, Patient Position: Semi-Botello's, Cuff Size: Adult Regular)   Pulse 65   Temp 97.7  F (36.5  C) (Oral)   Resp 18   Wt (P) 71 kg (156 lb 8 oz)   SpO2 99%   BMI (P) 24.51 kg/m      O2: 94% on 1L via nasal cannula.    Output: Continent of bowel and bladder, uses urinal   Last BM: 7/27/2025   Activity: Independent in room with walker    Skin: Scattered bruises on bilateral upper extremities   Pain: denies   CMS: A&O x4    Dressing: none   Diet: Regular/Thin   LDA: none   Equipment: Walker, oxygen    Plan: Continue POC   Additional Info: Hypostatic during therapy session, returned to baseline when back to bed. Patient verbalized concerns about discharge on Wednesday, doesn't feel that he is ready.  updated.  PRN cough syrup administered for dry, nonproductive cough .    Patient alert and oriented, able to make needs known, call light in reach and bed in lowest position. Denied any n/v or chest pain, no acute events this shift.

## 2025-07-28 NOTE — PLAN OF CARE
Goal Outcome Evaluation:    Alert and orientedx4, A1 walker. Makes needs known.  Pt denied pain at night.  Continent of bowel and bladder, LBM 7/24.  2L O2 NC.  No acute changes, continue with POC.  Video appt with outside provider today at 9:45AM.

## 2025-07-29 ENCOUNTER — APPOINTMENT (OUTPATIENT)
Dept: OCCUPATIONAL THERAPY | Facility: SKILLED NURSING FACILITY | Age: 65
DRG: 948 | End: 2025-07-29
Attending: INTERNAL MEDICINE
Payer: COMMERCIAL

## 2025-07-29 ENCOUNTER — APPOINTMENT (OUTPATIENT)
Dept: PHYSICAL THERAPY | Facility: SKILLED NURSING FACILITY | Age: 65
DRG: 948 | End: 2025-07-29
Attending: INTERNAL MEDICINE
Payer: COMMERCIAL

## 2025-07-29 LAB
HOLD SPECIMEN: NORMAL
MCV RBC AUTO: 91 FL (ref 78–100)
PLATELET # BLD AUTO: 302 10E3/UL (ref 150–450)

## 2025-07-29 PROCEDURE — 250N000011 HC RX IP 250 OP 636: Performed by: PHYSICIAN ASSISTANT

## 2025-07-29 PROCEDURE — 250N000013 HC RX MED GY IP 250 OP 250 PS 637: Performed by: INTERNAL MEDICINE

## 2025-07-29 PROCEDURE — 99309 SBSQ NF CARE MODERATE MDM 30: CPT | Performed by: PHYSICIAN ASSISTANT

## 2025-07-29 PROCEDURE — 999N000157 HC STATISTIC RCP TIME EA 10 MIN

## 2025-07-29 PROCEDURE — 97535 SELF CARE MNGMENT TRAINING: CPT | Mod: GO

## 2025-07-29 PROCEDURE — 97530 THERAPEUTIC ACTIVITIES: CPT | Mod: GP

## 2025-07-29 PROCEDURE — 97110 THERAPEUTIC EXERCISES: CPT | Mod: GP

## 2025-07-29 PROCEDURE — 250N000012 HC RX MED GY IP 250 OP 636 PS 637: Performed by: PHYSICIAN ASSISTANT

## 2025-07-29 PROCEDURE — XW023W7 INTRODUCTION OF COVID-19 VACCINE BOOSTER INTO MUSCLE, PERCUTANEOUS APPROACH, NEW TECHNOLOGY GROUP 7: ICD-10-PCS | Performed by: PHYSICIAN ASSISTANT

## 2025-07-29 PROCEDURE — 90480 ADMN SARSCOV2 VAC 1/ONLY CMP: CPT | Performed by: PHYSICIAN ASSISTANT

## 2025-07-29 PROCEDURE — 120N000009 HC R&B SNF

## 2025-07-29 PROCEDURE — 91320 SARSCV2 VAC 30MCG TRS-SUC IM: CPT | Performed by: PHYSICIAN ASSISTANT

## 2025-07-29 PROCEDURE — 250N000011 HC RX IP 250 OP 636: Performed by: INTERNAL MEDICINE

## 2025-07-29 PROCEDURE — 250N000013 HC RX MED GY IP 250 OP 250 PS 637: Performed by: PHYSICIAN ASSISTANT

## 2025-07-29 PROCEDURE — 250N000012 HC RX MED GY IP 250 OP 636 PS 637: Performed by: INTERNAL MEDICINE

## 2025-07-29 PROCEDURE — 36415 COLL VENOUS BLD VENIPUNCTURE: CPT | Performed by: INTERNAL MEDICINE

## 2025-07-29 PROCEDURE — 85049 AUTOMATED PLATELET COUNT: CPT | Performed by: INTERNAL MEDICINE

## 2025-07-29 RX ORDER — DIPHENHYDRAMINE HCL 25 MG
50 CAPSULE ORAL
Status: DISCONTINUED | OUTPATIENT
Start: 2025-07-29 | End: 2025-07-30 | Stop reason: HOSPADM

## 2025-07-29 RX ORDER — DIPHENHYDRAMINE HYDROCHLORIDE 50 MG/ML
50 INJECTION, SOLUTION INTRAMUSCULAR; INTRAVENOUS
Status: DISCONTINUED | OUTPATIENT
Start: 2025-07-29 | End: 2025-07-30 | Stop reason: HOSPADM

## 2025-07-29 RX ADMIN — NALTREXONE HYDROCHLORIDE 50 MG: 50 TABLET, FILM COATED ORAL at 08:53

## 2025-07-29 RX ADMIN — FLUOXETINE HYDROCHLORIDE 60 MG: 40 CAPSULE ORAL at 08:53

## 2025-07-29 RX ADMIN — MYCOPHENOLATE MOFETIL 750 MG: 250 CAPSULE ORAL at 17:42

## 2025-07-29 RX ADMIN — COVID-19 VACCINE, MRNA 30 MCG: 0.04 INJECTION, SUSPENSION INTRAMUSCULAR at 17:36

## 2025-07-29 RX ADMIN — EMPAGLIFLOZIN 10 MG: 10 TABLET, FILM COATED ORAL at 08:52

## 2025-07-29 RX ADMIN — ACETAMINOPHEN 650 MG: 325 TABLET, FILM COATED ORAL at 09:01

## 2025-07-29 RX ADMIN — Medication 1 TABLET: at 12:04

## 2025-07-29 RX ADMIN — Medication 5 MG: at 21:26

## 2025-07-29 RX ADMIN — PANTOPRAZOLE SODIUM 40 MG: 40 TABLET, DELAYED RELEASE ORAL at 08:53

## 2025-07-29 RX ADMIN — LATANOPROST 1 DROP: 50 SOLUTION/ DROPS OPHTHALMIC at 21:21

## 2025-07-29 RX ADMIN — MYCOPHENOLATE MOFETIL 750 MG: 250 CAPSULE ORAL at 08:52

## 2025-07-29 RX ADMIN — ASPIRIN 81 MG: 81 TABLET, DELAYED RELEASE ORAL at 08:53

## 2025-07-29 RX ADMIN — ROSUVASTATIN CALCIUM 20 MG: 10 TABLET, FILM COATED ORAL at 08:51

## 2025-07-29 RX ADMIN — FLUTICASONE FUROATE AND VILANTEROL TRIFENATATE 1 PUFF: 100; 25 POWDER RESPIRATORY (INHALATION) at 08:54

## 2025-07-29 RX ADMIN — ENOXAPARIN SODIUM 40 MG: 40 INJECTION SUBCUTANEOUS at 08:51

## 2025-07-29 RX ADMIN — ENTECAVIR 0.5 MG: 0.5 TABLET ORAL at 08:53

## 2025-07-29 RX ADMIN — Medication 1 TABLET: at 12:03

## 2025-07-29 RX ADMIN — PREDNISONE 5 MG: 5 TABLET ORAL at 08:53

## 2025-07-29 ASSESSMENT — ACTIVITIES OF DAILY LIVING (ADL)
ADLS_ACUITY_SCORE: 49
ADLS_ACUITY_SCORE: 48
ADLS_ACUITY_SCORE: 49

## 2025-07-29 NOTE — PROGRESS NOTES
TCU Notice of Medicare Non-Coverage Note:     Met with patient to Introduce self and role.     Discussed Notice of Medicare Non-Coverage and last day of coverage as required for all patients with Medicare coverage during Skilled Nursing Facility (SNF) stays.Informed patient/family that Medicare covers stay until midnight of day before discharge. TCU does not bill for discharge date.    Last coverage day is 7/31/25. Discharge date expected 8/1/25.    Opportunity provided for questions and education provided regarding potential financial impact to patient.     Patient verbalize(s) understanding of discussion.     SW gave him blank copy of HCD.  SW said if he can fill it out in next few days ARU SW can notarize it for him.     ESTEE Euceda   [facility], Transitional Care Unit   Social Work   [facility] [place] [address], [address] [place] [address]  (PH) [phone]

## 2025-07-29 NOTE — PROGRESS NOTES
[facility] Services   Internal Medicine Progress Note    Rehab Day # 8    Assessment & Plan:   Jerad Ross is a 65 year old male with history of NSTEMI (2014), FSGS s/p DDKT (1978, 1993), HTN, chronic anemia, PE, chronic HBV, and COPD who was admitted to [facility] 7/9 - 7/21/25 with acute hypoxic respiratory failure 2/2 rhinovirus and newly diagnosed HFpEF with acute decompensation. Transferred to  TCU 7/21/25 for physical rehabilitation.      Physical deconditioning: PT, OT following     Acute hypoxic respiratory failure  Rhinovirus infection  Possible COPD  H/o COPD noted in chart but PFTs appeared more c/w mild restrictive lung disease. Presented with dyspnea and hypoxia initially requiring BiPAP. CT chest revealed multifocal GGOs, negative for PE. RVP +for rhinovirus. HFpEF exacerbation also contributing to AHRF, diuresed with IV Lasix. TTE also revealed new LV diastolic dysfunction and he was diuresed with IV Lasix. O2 needs improving  - Continue Breo Ellipta, prn albuterol   - Continue droplet precautions until symptoms resolved  - Overnight oximetry testing tonight, walk test 7/30 in anticipation of discharge 8/1  - O2 PRN to maintain SpO2 >90%  - Consider repeat PFTs as outpatient  - Follow up with PCP today as planned    Orthostatic hypotension: Patient having significant drops in BP while working with therapy in the early morning. He does session without having breakfast or anything to eat. Suspect patient dehydrated prior to eating in the morning  - Will monitor BP with later PT session today (after having eaten)  - Will discuss moving OT to a later time to allow patient to have breakfast prior to session  - If no improvement with eating, will need further workup     HFpEF with acute decompensation  CAD s/p CABG  NSTEMI (2014)  New diagnosis. TTE 7/14 grade I LV diastolic dysfunction, EF 55-65%. CXR with pulmonary edema. Diuresed with IV Lasix. EDW ~152 lbs. Currently appears  euvolemic  - Continue Jardiance (started inpatient), Metoprolol, Imdur, ASA 81 daily, PTA statin  - Daily weights  - Needs Cardiology follow-up, referral placed but appointment not yet scheduled     FSGS s/p renal transplant x2 (1978, 1993): Renal function stable. No acute issues.  - Continue PTA prednisone   - Increase MMF back to PTA dose of 750 mg bid today  - BMP weekly     Other Stable Medical Issues:  Chronic HBV: Continue PTA entecavir  Depression, anxiety: Continue fluoxetine, prn hydroxyzine  Insomnia: Melatonin prn  GERD: Continue PPI   Glaucoma: Continue PTA latanoprost drops  Alcohol use disorder, in remission: Continue PTA naltrexone  H/o PE: Provoked after hip surgery in 8/2023. Not maintained on AC  H/o MITZI: Has visit with Dr. Bartholomew on 8/13  Moderate malnutrition: Low threshold for nutrition consult. Continue snacks/supplements with meals      The above was discussed with the patient and attending physician, Dr. Royal who agrees with the plan.    CODE: DNR/DNI  DVT: Lovenox  Diet: Low na  Indications for Psychotropic Medications: Fluoxetine and hydroxyzine at lowest effective dose for depression and anxiety  Disposition: PT, OT through 7/31, home 8/1    Gabbi Denise PA-C  Hospitalist Service  Please contact via [employer]  ________________________________________________________________    Subjective & Interval History:    Doing pretty well. Endorses dizziness with standing when working with therapy in the early morning. No LOC or falls. Symptoms now resolved. Had breakfast after OT session today. Did not eat anything prior to OT since sessions are so early in the morning. Otherwise, doing well. Breathing improving. Doing well with stairs. Appetite stable     Last 24 hour care team notes reviewed.   ROS: 4 point ROS (including Respiratory, CV, GI and ) was performed and negative unless otherwise noted in HPI.     Medications: Reviewed in EPIC.    Physical Exam:    General Appearance: Alert  and oriented x3, pleasant  HEENT: Anicteric sclera, MMM   Respiratory: Breathing comfortably on 1L NC, lungs CTAB without wheezing or crackles   Cardiovascular: RRR, S1, S2. No murmur noted  GI: Abdomen soft, non-tender with active bowel sounds. No guarding or rebound  Lymph/Hematologic: No peripheral edema, distal pulses palpable   Skin: No rash or jaundice   Musculoskeletal: Moves all extremities   Neurologic: No focal deficits, CN II-XII grossly intact      Lines/Tubes/Drains:

## 2025-07-29 NOTE — PROGRESS NOTES
Interdisciplinary team including providers, therapy, nursing, nutrition, social work/care coordinator rounded prior to meeting with patient          Care Conference (Team meeting with patient):    Discharge environment/plan: Discharge home with OP pulmonary      Barriers to discharge: Patient and IDT reporting recent orthostatic concerns with therapy early in the morning.  Provider requested no early morning therapy and allowing patient to have breakfast before therapy.  Patient has been working towards weaning from O2 today.        Time Frame for discharge:8/1/25      Equipment/Caregiver Training Needed: PT/OT to address with equipment needs.  Nursing to provide education needed prior to discharge.      Transportation Plan: TBD closer to discharge

## 2025-07-29 NOTE — PROGRESS NOTES
07/29/25 1030   Appointment Info   Signing Clinician's Name / Credentials (PT) Juju Burgess PTA   PT Assistant Visit Number 3   Rehab Comments (PT) PT: Pt on RA throughout session. SpO2 remained >90% with all activity   Therapeutic Procedure/Exercise   Ther. Procedure: strength, endurance, ROM, flexibillity Minutes (47668) 32   Symptoms Noted During/After Treatment shortness of breath;fatigue   Treatment Detail/Skilled Intervention PT: focus on standing BLE strengthening and endurance w/alternating lunges to blue bosu for 2 bouts x 10 reps ea. repeated STS from w/c, requires use of BUE, 2 bouts x 5 reps ea. gait activity for endurance: amb 75ft x 1, FWW, supervision with w/c brought behind for seated rest breaks only. Needing extended seated rest breaks between activities to allow CHAPPELL to resolve and improved breathing to manage SpO2 on RA.   Therapeutic Activity   Therapeutic Activities: dynamic activities to improve functional performance Minutes (43734) 8   Symptoms Noted During/After Treatment Fatigue;Shortness of breath   Treatment Detail/Skilled Intervention PT: focus on progression of stairs to simulate home environment. see gg...needing 4 min of seated rest break w/focus on large breaths for management of CHAPPELL and SpO2 management on RA.   PT Discharge Planning   PT Plan PT: O2 walk test 7/30 per provider preference, STS from progressively lower surfaces. watch SpO2 on RA with activit   PT Discharge Recommendation (DC Rec) home with outpatient pulmonary rehab   PT Rationale for DC Rec PT: Pt lives alone but has room mate   PT Brief overview of current status amb up to 150ft w/FWW, supervsion. IND for bed mobility, supervision for transfers   Physical Therapy Time and Intention   Timed Code Treatment Minutes 40   Total Session Time (sum of timed and untimed services) 40   Post Acute Settings Only   What unit is patient on? TCU   PT - Transitional Care Unit Time   Individual Time (minutes) - PT 40   Group  "Time (minutes) - PT 0   Concurrent Time (minutes) - PT 0   Co-Treatment Time (minutes) - PT 0   TCU Total Session Time (minutes) - PT 40   TCU Daily Total Session Time   PT TCU Daily Total Session Time 40   Rehab TCU Daily Total Session Time 93   12 Steps - Ability to go up/down steps.   Patient Performance Supervision or verbal cues   Describe Performance PT: ascend/descend 12 - 6\" steps, B HR, supervision, self selected step over pattern. One cue for breathing throughout to manage SpO2 with activity.       "

## 2025-07-29 NOTE — PLAN OF CARE
VS: VSS  Neuro: intact. Alert and orientedx4  Lungs: Unlabored breathing. On O2 at 2 lpm via nasal cannula O2 sats well. Wean off O2- Tried RA O2 sats 95- 98%- continue to monitor. Occasional coughing  Heart: Denies chest pain  Bowel/Bladder: Continent. Last BM: yesterdy per pt  Ambulation/Transfers: Mod I in room with walker  Diet/Liquids: Cardiac low fat diet thin liquid. No swallowing iissue. Takes meds whole  Skin: intact  Pain: Denies upon assessment  Other: Home oxygen assessment to be done audrey- ordered  Provider made aware regarding pts BP drop during therapy session with dizziness.  -Reassess pt and feels better BP better.    Message provider Cande BUTT thru vocera: FYI: BP meds Imdur and metoprolol not given d/t BP parameters.

## 2025-07-29 NOTE — PLAN OF CARE
Goal Outcome Evaluation:  No acute issues overnight. Appears to be sleeping well and has no c/o's or requests as of yet. Continent using urinal indep.- staff emptied 425mls clear, yellow urine. Using O2 @ 1L via NC. Overnight oximetry ordered for 07/29. Indep.bed mobility and mod I in room. Droplet precautions. Early AM weight via standing scale obtained.            Patient's most recent vital signs are:     Vital signs:  BP: 103/62  Temp: 97.4  HR: 73  RR: 18  SpO2: 95 %     Patient does not have new respiratory symptoms.  Patient does not have new sore throat.  Patient does not have a fever greater than 99.5.

## 2025-07-30 ENCOUNTER — HOSPITAL ENCOUNTER (INPATIENT)
Facility: CLINIC | Age: 65
End: 2025-07-30
Attending: STUDENT IN AN ORGANIZED HEALTH CARE EDUCATION/TRAINING PROGRAM | Admitting: STUDENT IN AN ORGANIZED HEALTH CARE EDUCATION/TRAINING PROGRAM
Payer: COMMERCIAL

## 2025-07-30 ENCOUNTER — APPOINTMENT (OUTPATIENT)
Dept: GENERAL RADIOLOGY | Facility: CLINIC | Age: 65
End: 2025-07-30
Attending: STUDENT IN AN ORGANIZED HEALTH CARE EDUCATION/TRAINING PROGRAM
Payer: COMMERCIAL

## 2025-07-30 ENCOUNTER — APPOINTMENT (OUTPATIENT)
Dept: OCCUPATIONAL THERAPY | Facility: SKILLED NURSING FACILITY | Age: 65
DRG: 948 | End: 2025-07-30
Attending: INTERNAL MEDICINE
Payer: COMMERCIAL

## 2025-07-30 VITALS
WEIGHT: 156.6 LBS | DIASTOLIC BLOOD PRESSURE: 51 MMHG | BODY MASS INDEX: 24.53 KG/M2 | SYSTOLIC BLOOD PRESSURE: 100 MMHG | OXYGEN SATURATION: 99 % | HEART RATE: 79 BPM | TEMPERATURE: 98.1 F | RESPIRATION RATE: 18 BRPM

## 2025-07-30 LAB
ALBUMIN SERPL BCG-MCNC: 2.8 G/DL (ref 3.5–5.2)
ALP SERPL-CCNC: 50 U/L (ref 40–150)
ALT SERPL W P-5'-P-CCNC: 14 U/L (ref 0–70)
ANION GAP SERPL CALCULATED.3IONS-SCNC: 11 MMOL/L (ref 7–15)
AST SERPL W P-5'-P-CCNC: 21 U/L (ref 0–45)
ATRIAL RATE - MUSE: 74 BPM
BASE EXCESS BLDV CALC-SCNC: 4.8 MMOL/L (ref -3–3)
BASOPHILS # BLD AUTO: 0.1 10E3/UL (ref 0–0.2)
BASOPHILS NFR BLD AUTO: 1 %
BILIRUB SERPL-MCNC: 0.5 MG/DL
BUN SERPL-MCNC: 23.4 MG/DL (ref 8–23)
CALCIUM SERPL-MCNC: 8.2 MG/DL (ref 8.8–10.4)
CHLORIDE SERPL-SCNC: 100 MMOL/L (ref 98–107)
CREAT SERPL-MCNC: 0.7 MG/DL (ref 0.67–1.17)
CREAT SERPL-MCNC: 0.7 MG/DL (ref 0.67–1.17)
DIASTOLIC BLOOD PRESSURE - MUSE: NORMAL MMHG
EGFRCR SERPLBLD CKD-EPI 2021: >90 ML/MIN/1.73M2
EGFRCR SERPLBLD CKD-EPI 2021: >90 ML/MIN/1.73M2
EOSINOPHIL # BLD AUTO: 0.1 10E3/UL (ref 0–0.7)
EOSINOPHIL NFR BLD AUTO: 1 %
ERYTHROCYTE [DISTWIDTH] IN BLOOD BY AUTOMATED COUNT: 16.2 % (ref 10–15)
ERYTHROCYTE [DISTWIDTH] IN BLOOD BY AUTOMATED COUNT: 16.3 % (ref 10–15)
ERYTHROCYTE [DISTWIDTH] IN BLOOD BY AUTOMATED COUNT: 16.5 % (ref 10–15)
GLUCOSE SERPL-MCNC: 137 MG/DL (ref 70–99)
HAPTOGLOB SERPL-MCNC: 242 MG/DL (ref 30–200)
HCO3 BLDV-SCNC: 31 MMOL/L (ref 21–28)
HCO3 SERPL-SCNC: 25 MMOL/L (ref 22–29)
HCT VFR BLD AUTO: 25.6 % (ref 40–53)
HCT VFR BLD AUTO: 26 % (ref 40–53)
HCT VFR BLD AUTO: 26.4 % (ref 40–53)
HGB BLD-MCNC: 7.9 G/DL (ref 13.3–17.7)
HGB BLD-MCNC: 8.2 G/DL (ref 13.3–17.7)
HGB BLD-MCNC: 8.4 G/DL (ref 13.3–17.7)
HOLD SPECIMEN: NORMAL
IMM GRANULOCYTES # BLD: 0 10E3/UL
IMM GRANULOCYTES NFR BLD: 1 %
INR PPP: 1.1 (ref 0.85–1.15)
INTERPRETATION ECG - MUSE: NORMAL
LDH SERPL L TO P-CCNC: 160 U/L (ref 0–250)
LYMPHOCYTES # BLD AUTO: 0.6 10E3/UL (ref 0.8–5.3)
LYMPHOCYTES NFR BLD AUTO: 8 %
MAGNESIUM SERPL-MCNC: 1.8 MG/DL (ref 1.7–2.3)
MCH RBC QN AUTO: 29 PG (ref 26.5–33)
MCH RBC QN AUTO: 29.2 PG (ref 26.5–33)
MCH RBC QN AUTO: 29.4 PG (ref 26.5–33)
MCHC RBC AUTO-ENTMCNC: 30.9 G/DL (ref 31.5–36.5)
MCHC RBC AUTO-ENTMCNC: 31.5 G/DL (ref 31.5–36.5)
MCHC RBC AUTO-ENTMCNC: 31.8 G/DL (ref 31.5–36.5)
MCV RBC AUTO: 92 FL (ref 78–100)
MCV RBC AUTO: 93 FL (ref 78–100)
MCV RBC AUTO: 94 FL (ref 78–100)
MONOCYTES # BLD AUTO: 0.6 10E3/UL (ref 0–1.3)
MONOCYTES NFR BLD AUTO: 8 %
NEUTROPHILS # BLD AUTO: 6.6 10E3/UL (ref 1.6–8.3)
NEUTROPHILS NFR BLD AUTO: 83 %
NRBC # BLD AUTO: 0 10E3/UL
NRBC BLD AUTO-RTO: 0 /100
O2/TOTAL GAS SETTING VFR VENT: 21 %
OXYHGB MFR BLDV: 43 % (ref 70–75)
P AXIS - MUSE: 4 DEGREES
PCO2 BLDV: 51 MM HG (ref 40–50)
PH BLDV: 7.39 [PH] (ref 7.32–7.43)
PLATELET # BLD AUTO: 272 10E3/UL (ref 150–450)
PLATELET # BLD AUTO: 287 10E3/UL (ref 150–450)
PLATELET # BLD AUTO: 300 10E3/UL (ref 150–450)
PO2 BLDV: 27 MM HG (ref 25–47)
POTASSIUM SERPL-SCNC: 4.2 MMOL/L (ref 3.4–5.3)
PR INTERVAL - MUSE: 156 MS
PROT SERPL-MCNC: 5.1 G/DL (ref 6.4–8.3)
PROTHROMBIN TIME: 14.8 SECONDS (ref 11.8–14.8)
QRS DURATION - MUSE: 82 MS
QT - MUSE: 414 MS
QTC - MUSE: 459 MS
R AXIS - MUSE: -20 DEGREES
RBC # BLD AUTO: 2.72 10E6/UL (ref 4.4–5.9)
RBC # BLD AUTO: 2.81 10E6/UL (ref 4.4–5.9)
RBC # BLD AUTO: 2.86 10E6/UL (ref 4.4–5.9)
RETICS # AUTO: 0.15 10E6/UL (ref 0.03–0.1)
RETICS/RBC NFR AUTO: 5.4 % (ref 0.5–2)
SAO2 % BLDV: 44.4 % (ref 70–75)
SODIUM SERPL-SCNC: 136 MMOL/L (ref 135–145)
SYSTOLIC BLOOD PRESSURE - MUSE: NORMAL MMHG
T AXIS - MUSE: 121 DEGREES
TROPONIN T SERPL HS-MCNC: 35 NG/L
TROPONIN T SERPL HS-MCNC: 36 NG/L
VENTRICULAR RATE- MUSE: 74 BPM
WBC # BLD AUTO: 8 10E3/UL (ref 4–11)
WBC # BLD AUTO: 8.9 10E3/UL (ref 4–11)
WBC # BLD AUTO: 9.7 10E3/UL (ref 4–11)

## 2025-07-30 PROCEDURE — 250N000013 HC RX MED GY IP 250 OP 250 PS 637: Performed by: INTERNAL MEDICINE

## 2025-07-30 PROCEDURE — 90677 PCV20 VACCINE IM: CPT | Performed by: PHYSICIAN ASSISTANT

## 2025-07-30 PROCEDURE — 71046 X-RAY EXAM CHEST 2 VIEWS: CPT | Mod: 26 | Performed by: RADIOLOGY

## 2025-07-30 PROCEDURE — 83735 ASSAY OF MAGNESIUM: CPT | Performed by: PHYSICIAN ASSISTANT

## 2025-07-30 PROCEDURE — 85045 AUTOMATED RETICULOCYTE COUNT: CPT | Performed by: PHYSICIAN ASSISTANT

## 2025-07-30 PROCEDURE — 85060 BLOOD SMEAR INTERPRETATION: CPT | Performed by: PATHOLOGY

## 2025-07-30 PROCEDURE — G0009 ADMIN PNEUMOCOCCAL VACCINE: HCPCS | Performed by: PHYSICIAN ASSISTANT

## 2025-07-30 PROCEDURE — 250N000011 HC RX IP 250 OP 636: Performed by: INTERNAL MEDICINE

## 2025-07-30 PROCEDURE — 82040 ASSAY OF SERUM ALBUMIN: CPT | Performed by: PHYSICIAN ASSISTANT

## 2025-07-30 PROCEDURE — 83010 ASSAY OF HAPTOGLOBIN QUANT: CPT | Performed by: PHYSICIAN ASSISTANT

## 2025-07-30 PROCEDURE — 82565 ASSAY OF CREATININE: CPT | Performed by: INTERNAL MEDICINE

## 2025-07-30 PROCEDURE — 250N000012 HC RX MED GY IP 250 OP 636 PS 637: Performed by: INTERNAL MEDICINE

## 2025-07-30 PROCEDURE — 250N000011 HC RX IP 250 OP 636: Performed by: PHYSICIAN ASSISTANT

## 2025-07-30 PROCEDURE — 97535 SELF CARE MNGMENT TRAINING: CPT | Mod: GO

## 2025-07-30 PROCEDURE — 84484 ASSAY OF TROPONIN QUANT: CPT | Performed by: PHYSICIAN ASSISTANT

## 2025-07-30 PROCEDURE — 82805 BLOOD GASES W/O2 SATURATION: CPT | Performed by: PHYSICIAN ASSISTANT

## 2025-07-30 PROCEDURE — 99207 PR APP CREDIT; MD BILLING SHARED VISIT: CPT | Performed by: PHYSICIAN ASSISTANT

## 2025-07-30 PROCEDURE — 85018 HEMOGLOBIN: CPT | Performed by: PHYSICIAN ASSISTANT

## 2025-07-30 PROCEDURE — 36415 COLL VENOUS BLD VENIPUNCTURE: CPT | Performed by: INTERNAL MEDICINE

## 2025-07-30 PROCEDURE — 250N000012 HC RX MED GY IP 250 OP 636 PS 637: Performed by: PHYSICIAN ASSISTANT

## 2025-07-30 PROCEDURE — 93005 ELECTROCARDIOGRAM TRACING: CPT

## 2025-07-30 PROCEDURE — 36415 COLL VENOUS BLD VENIPUNCTURE: CPT | Performed by: PHYSICIAN ASSISTANT

## 2025-07-30 PROCEDURE — 85610 PROTHROMBIN TIME: CPT | Performed by: PHYSICIAN ASSISTANT

## 2025-07-30 PROCEDURE — 83615 LACTATE (LD) (LDH) ENZYME: CPT | Performed by: PHYSICIAN ASSISTANT

## 2025-07-30 PROCEDURE — 99316 NF DSCHRG MGMT 30 MIN+: CPT | Performed by: PHYSICIAN ASSISTANT

## 2025-07-30 PROCEDURE — 85025 COMPLETE CBC W/AUTO DIFF WBC: CPT | Performed by: PHYSICIAN ASSISTANT

## 2025-07-30 RX ORDER — ONDANSETRON 4 MG/1
4 TABLET, ORALLY DISINTEGRATING ORAL EVERY 6 HOURS PRN
Status: DISCONTINUED | OUTPATIENT
Start: 2025-07-30 | End: 2025-07-30 | Stop reason: HOSPADM

## 2025-07-30 RX ORDER — ACETAMINOPHEN 650 MG/1
650 SUPPOSITORY RECTAL EVERY 4 HOURS PRN
Status: ON HOLD | COMMUNITY
Start: 2025-07-30 | End: 2025-08-04

## 2025-07-30 RX ORDER — ENOXAPARIN SODIUM 100 MG/ML
40 INJECTION SUBCUTANEOUS EVERY 24 HOURS
Status: DISCONTINUED | OUTPATIENT
Start: 2025-07-31 | End: 2025-07-30 | Stop reason: HOSPADM

## 2025-07-30 RX ORDER — MULTIPLE VITAMINS W/ MINERALS TAB 9MG-400MCG
1 TAB ORAL
Status: DISCONTINUED | OUTPATIENT
Start: 2025-07-30 | End: 2025-07-30 | Stop reason: HOSPADM

## 2025-07-30 RX ORDER — MYCOPHENOLATE MOFETIL 250 MG/1
750 CAPSULE ORAL
Status: ON HOLD | COMMUNITY
Start: 2025-07-30

## 2025-07-30 RX ADMIN — FLUOXETINE HYDROCHLORIDE 60 MG: 40 CAPSULE ORAL at 07:58

## 2025-07-30 RX ADMIN — ROSUVASTATIN CALCIUM 20 MG: 10 TABLET, FILM COATED ORAL at 07:58

## 2025-07-30 RX ADMIN — ASPIRIN 81 MG: 81 TABLET, DELAYED RELEASE ORAL at 07:58

## 2025-07-30 RX ADMIN — NALTREXONE HYDROCHLORIDE 50 MG: 50 TABLET, FILM COATED ORAL at 07:58

## 2025-07-30 RX ADMIN — ONDANSETRON 4 MG: 4 TABLET, ORALLY DISINTEGRATING ORAL at 08:51

## 2025-07-30 RX ADMIN — ENTECAVIR 0.5 MG: 0.5 TABLET ORAL at 07:58

## 2025-07-30 RX ADMIN — Medication 1 TABLET: at 11:56

## 2025-07-30 RX ADMIN — ENOXAPARIN SODIUM 40 MG: 40 INJECTION SUBCUTANEOUS at 07:58

## 2025-07-30 RX ADMIN — FLUTICASONE FUROATE AND VILANTEROL TRIFENATATE 1 PUFF: 100; 25 POWDER RESPIRATORY (INHALATION) at 07:57

## 2025-07-30 RX ADMIN — EMPAGLIFLOZIN 10 MG: 10 TABLET, FILM COATED ORAL at 07:57

## 2025-07-30 RX ADMIN — PREDNISONE 5 MG: 5 TABLET ORAL at 07:57

## 2025-07-30 RX ADMIN — PANTOPRAZOLE SODIUM 40 MG: 40 TABLET, DELAYED RELEASE ORAL at 07:57

## 2025-07-30 RX ADMIN — GUAIFENESIN AND DEXTROMETHORPHAN 10 ML: 100; 10 SYRUP ORAL at 14:02

## 2025-07-30 RX ADMIN — MYCOPHENOLATE MOFETIL 750 MG: 250 CAPSULE ORAL at 07:58

## 2025-07-30 RX ADMIN — PNEUMOCOCCAL 20-VALENT CONJUGATE VACCINE 0.5 ML
2.2; 2.2; 2.2; 2.2; 2.2; 2.2; 2.2; 2.2; 2.2; 2.2; 2.2; 2.2; 2.2; 2.2; 2.2; 2.2; 4.4; 2.2; 2.2; 2.2 INJECTION, SUSPENSION INTRAMUSCULAR at 11:57

## 2025-07-30 ASSESSMENT — ACTIVITIES OF DAILY LIVING (ADL)
ADLS_ACUITY_SCORE: 56
ADLS_ACUITY_SCORE: 50
ADLS_ACUITY_SCORE: 56
ADLS_ACUITY_SCORE: 56
ADLS_ACUITY_SCORE: 48
ADLS_ACUITY_SCORE: 56
ADLS_ACUITY_SCORE: 50
ADLS_ACUITY_SCORE: 56
ADLS_ACUITY_SCORE: 49
ADLS_ACUITY_SCORE: 50
ADLS_ACUITY_SCORE: 56
ADLS_ACUITY_SCORE: 48
ADLS_ACUITY_SCORE: 48
ADLS_ACUITY_SCORE: 56
ADLS_ACUITY_SCORE: 56
ADLS_ACUITY_SCORE: 49

## 2025-07-30 NOTE — PLAN OF CARE
"Goal Outcome Evaluation:      Plan of Care Reviewed With: patient    Overall Patient Progress: improvingOverall Patient Progress: improving         VS: /60 (BP Location: Right arm, Patient Position: Supine, Cuff Size: Adult Regular)   Pulse 77   Temp 98.1  F (36.7  C) (Oral)   Resp 18   Wt 71 kg (156 lb 9.6 oz)   SpO2 95%   BMI 24.53 kg/m      O2: 95% on 2L nasal cannula   Output: Continent bowel and bladder   Last BM: 7/28/2025   Activity: Kenny in room w/ walker, A1 when hypotensive    Skin: Scattered bruises on bilateral upper extremities    Pain: Denied    CMS: A&O x4   Dressing: none   Diet: Combo low sat. Fat sodium <2400mg/ thin liquids    LDA: none   Equipment: Walker    Plan: Discharge to [facility] ED via [facility]- stretcher for further work up    Additional Info: Nausea reported in the morning PRN Zofran administered. Patient stated that he was feeling \"off\" all morning, PA notified and EKG, chest x-ray and labs ordered.                "

## 2025-07-30 NOTE — ED PROVIDER NOTES
[place] EMERGENCY DEPARTMENT ([facility])    7/30/25       ED PROVIDER NOTE      History     Chief Complaint   Patient presents with    Shortness of Breath     Increased shortness of breath and decreased hemoglobin     HPI  Jerad Ross is a 65 year old male with a history of chronic anemia, chrnoic hypoxic respiratory failure, HFpEF, CAD s/p CABG, NSTEMI (2014), focal segmental glomerulosclerosis s/p renal transplant x2 (1978, 1993), chronic HBV, PE, total hip arthroplasty, alcohol use disorder, who presents to the ED with hypoxia and decreased hemoglobin. Patient's hemoglobin dropped from 12.3 on 7/24 to 8.4 today the 30th. Patient denies noticing any bright red stools. Patient denies any black or tarry stools. He has not noticed any blood loss and is unsure what could be causing his hemoglobin to drop. He does note feeling generally unwell, weak, and mildly short of breath.        Past Medical History  Past Medical History:   Diagnosis Date    Acute midline low back pain without sciatica     AION (acute ischemic optic neuropathy)     Anemia in chronic renal disease     Anxiety     Arthritis 1978    Post transplant    Avascular necrosis of bones of both hips (H)     s/p bilateral hip replacements    Avascular necrosis of bones of both hips (H)     s/p bilateral hip replacements    Basal cell carcinoma     Cataract     Chronic hepatitis B (H)     Chronic osteoarthritis 1978    Post transplant    Clostridium difficile colitis     COPD (chronic obstructive pulmonary disease) (H)     Coronary artery disease involving native coronary artery of native heart without angina pectoris 06/17/2014    Coronary angiogram 6/17/14: Severe distal 3-vessel disease involving small vessels, not amenable to PCI or CABG.    CRP elevated     Depression     Depressive disorder 1978    Post transplant    Dialysis patient     Diverticulosis     Dyslipidemia     Elevated C-reactive protein (CRP)     FSGS (focal segmental  glomerulosclerosis)     FSGS (focal segmental glomerulosclerosis)     Gastric ulcer with hemorrhage 02/12/2012    Gastric ulcer with hemorrhage 02/12/2012    GERD (gastroesophageal reflux disease)     Glaucoma     OHTN    Glaucoma     Hemorrhoids     History of blood transfusion     HTN (hypertension)     Hyperlipidemia 1978    Hypertension secondary to other renal disorders     Hypogonadism in male     Immunosuppressed status     Immunosuppression     Kidney replaced by transplant     focal glomerulosclerosis     Kidney transplanted     focal glomerulosclerosis     NSTEMI (non-ST elevated myocardial infarction) (H) 06/17/2014    NSTEMI (non-ST elevated myocardial infarction) (H) 06/17/2014    JULIANE (obstructive sleep apnea)     Doesn't use CPAP    Paracentral scotoma     LE    Renal failure     Secondary renal hyperparathyroidism     Septic bursitis     Squamous cell carcinoma     Uncomplicated asthma 2003    Well controled     Past Surgical History:   Procedure Laterality Date    APPENDECTOMY      ARTHROPLASTY REVISION HIP Right 06/19/2023    Procedure: RIGHT HIP REVISION;  Surgeon: Juan Mcdonough MD;  Location: [facility] OR    BIOPSY      Cardiac Bypass surgery  06/08/2020    [facility]     CATARACT EXTRACTION EXTRACAPSULAR W/ INTRAOCULAR LENS IMPLANTATION Bilateral 4-20-10, 6-1-10    CATARACT IOL, RT/LT  04/19/2000    RE    CATARACT IOL, RT/LT  06/01/2000    LE    COLECTOMY PARTIAL  01/01/1983     10 cm, diverticulitis     COLECTOMY SUBTOTAL  1983    10 cm, diverticulitis    COLONOSCOPY  02/13/2012    Procedure:COLONOSCOPY; Surgeon:SLOAN GALLARDO; Location: GI    COLONOSCOPY N/A 01/22/2020    Procedure: Colonoscopy, With Polypectomy And Biopsy;  Surgeon: Aston Kiran MD;  Location:  GI    COLONOSCOPY N/A 06/05/2025    Procedure: Colonoscopy;  Surgeon: Lina Claros MD;  Location:  GI    CV CORONARY ANGIOGRAM N/A 06/02/2020    Procedure: Coronary Angiogram;  Surgeon:  Alona Florentino MD;  Location: [facility] Cath Lab;  Service: Cardiology    CV CORONARY ANGIOGRAM N/A 09/20/2021    Procedure: Coronary Angiogram;  Surgeon: Adam Agudelo MD;  Location: [facility] CV    CV LEFT HEART CATHETERIZATION WITHOUT LEFT VENTRICULOGRAM Left 06/02/2020    Procedure: Left Heart Catheterization Without Left Ventriculogram;  Surgeon: Alona Florentino MD;  Location: [facility] Cath Lab;  Service: Cardiology    CV SUPRAVALVULAR AORTOGRAM N/A 09/20/2021    Procedure: Supravalvular Aortagram;  Surgeon: Adam Agudelo MD;  Location: [facility] CV    ESOPHAGOSCOPY, GASTROSCOPY, DUODENOSCOPY (EGD), COMBINED  02/13/2012    Procedure:COMBINED ESOPHAGOSCOPY, GASTROSCOPY, DUODENOSCOPY (EGD); Surgeon:SLOAN GALLARDO; Location: GI    ESOPHAGOSCOPY, GASTROSCOPY, DUODENOSCOPY (EGD), COMBINED  02/13/2012    EXTRACAPSULAR CATARACT EXTRATION WITH INTRAOCULAR LENS IMPLANT  4-20-10, 6-1-10    Rt, Lt    HERNIA REPAIR  1995    Lt inguinal    HERNIA REPAIR  1995    Lt inguinal    HIP SURGERY      1981, bilat MITZI, revised 2001, 2005    HIP SURGERY  01/01/1981    bilat MITZI, revised 2001, 2005    IR FINE NEEDLE ASPIRATION W ULTRASOUND  04/10/2023    JOINT REPLACEMENT      KIDNEY SURGERY  1978 and 1993    transplant    KNEE SURGERY  1983, 1987, 2001    bilat TKA    MOHS MICROGRAPHIC PROCEDURE      MOHS MICROGRAPHIC PROCEDURE      ORTHOPEDIC SURGERY      OTHER SURGICAL HISTORY      kidney [id number], 1993    PHACOEMULSIFICATION CLEAR CORNEA WITH STANDARD INTRAOCULAR LENS IMPLANT Right 04/19/2000    PHACOEMULSIFICATION CLEAR CORNEA WITH STANDARD INTRAOCULAR LENS IMPLANT Left 06/01/2000    MI BOWEL TO BOWEL ANASTOMOSIS      MI CARDIAC SURG PROCEDURE UNLISTED  2020    3x bypass    MI PELVIS/HIP JOINT SURGERY UNLISTED  1981, 1996, 2005, 2023    Both hips, L 1x rev. R 2x rev.    MI STOMACH SURGERY PROCEDURE UNLISTED  1978    Splenectomy    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, auxiliary  spleen    TONSILLECTOMY      TRANSPLANT      VASCULAR SURGERY  1976     acetaminophen (TYLENOL) 500 MG tablet  acetaminophen (TYLENOL) 650 MG suppository  albuterol (PROAIR HFA) 108 (90 Base) MCG/ACT inhaler  aspirin 81 MG EC tablet  budesonide (PULMICORT FLEXHALER) 90 MCG/ACT inhaler  Calcium Citrate-Vitamin D (CALCIUM CITRATE + D PO)  clindamycin (CLEOCIN) 150 MG capsule  Dextromethorphan-guaiFENesin (DIABETIC TUSSIN MAX ST)  MG/5ML LIQD  empagliflozin (JARDIANCE) 10 MG TABS tablet  entecavir (BARACLUDE) 0.5 MG tablet  FLUoxetine (PROZAC) 20 MG capsule  FLUoxetine (PROZAC) 40 MG capsule  fluticasone-salmeterol (ADVAIR) 250-50 MCG/ACT inhaler  furosemide (LASIX) 20 MG tablet  hydrOXYzine HCl (ATARAX) 10 MG tablet  hydrOXYzine HCl (ATARAX) 25 MG tablet  isosorbide mononitrate (IMDUR) 60 MG 24 hr tablet  latanoprost (XALATAN) 0.005 % ophthalmic solution  melatonin 5 MG tablet  metoprolol succinate ER (TOPROL XL) 25 MG 24 hr tablet  Multiple Vitamins-Iron (ONE DAILY MULTIVITAMIN/IRON) TABS  mycophenolate (GENERIC EQUIVALENT) 250 MG capsule  naltrexone (DEPADE/REVIA) 50 MG tablet  nitroGLYcerin (NITROSTAT) 0.4 MG sublingual tablet  Omega-3 Fatty Acids (OMEGA 3 PO)  pantoprazole (PROTONIX) 40 MG EC tablet  polyethylene glycol (MIRALAX) 17 g packet  predniSONE (DELTASONE) 5 MG tablet  rosuvastatin (CRESTOR) 20 MG tablet  sodium chloride (OCEAN) 0.65 % nasal spray  white petrolatum gel      Allergies   Allergen Reactions    Penicillins Shortness Of Breath and Hives     Occurred in childhood     Keflex [Cephalexin Hcl] Unknown     Patient could not recall reaction to Keflex  Per chart, has tolerated cefazolin (2023)    Sulfa Antibiotics Rash    Tetracycline Unknown     Patient could not recall reaction, childhood reaction     Family History  Family History   Problem Relation Age of Onset    Asthma Mother     Arthritis Mother     Cardiovascular Father         AI with valve repair    Hypertension Father     Kidney  Disease Father     Other - See Comments Father         AI with valve repair    Hyperlipidemia Father     Heart Disease Father     Anxiety Disorder Maternal Grandmother     Depression Maternal Grandmother     Breast Cancer Maternal Grandmother     Substance Abuse Maternal Grandfather     Cerebrovascular Disease Maternal Grandfather     Other - See Comments Maternal Grandfather         cerebrovascular disease    Depression Maternal Grandfather     Dementia Maternal Grandfather     Heart Disease Maternal Grandfather     Cancer Paternal Grandmother         ovarian ca    Ovarian Cancer Paternal Grandmother     Depression Paternal Grandmother     Uterine Cancer Paternal Grandmother     Cerebrovascular Disease Paternal Grandfather     Dementia Paternal Grandfather     Heart Disease Paternal Grandfather     Kidney Disease Paternal Aunt     Kidney Disease Paternal Aunt     Skin Cancer No family hx of     Glaucoma No family hx of     Macular Degeneration No family hx of     Amblyopia No family hx of     Melanoma No family hx of     Diabetes No family hx of      Social History   Social History     Tobacco Use    Smoking status: Former     Current packs/day: 0.00     Average packs/day: 1 pack/day for 9.0 years (9.0 ttl pk-yrs)     Types: Cigarettes     Start date: 1980     Quit date: 1988     Years since quittin.6     Passive exposure: Never    Smokeless tobacco: Former   Vaping Use    Vaping status: Never Used   Substance Use Topics    Alcohol use: Not Currently     Comment: quit drinking 2023    Drug use: Not Currently     Types: Oxycodone      Past medical history, past surgical history, medications, allergies, family history, and social history were reviewed with the patient. No additional pertinent items.   A complete review of systems was performed with pertinent positives and negatives noted in the HPI, and all other systems negative.    Physical Exam      Physical Exam  Vitals and nursing note reviewed.  "  Constitutional:       General: He is not in acute distress.     Appearance: Normal appearance. He is not toxic-appearing.   HENT:      Head: Atraumatic.   Eyes:      General: No scleral icterus.     Conjunctiva/sclera: Conjunctivae normal.   Cardiovascular:      Rate and Rhythm: Normal rate.      Heart sounds: Normal heart sounds.   Pulmonary:      Effort: Pulmonary effort is normal. No respiratory distress.      Breath sounds: Normal breath sounds.   Abdominal:      Palpations: Abdomen is soft.      Tenderness: There is no abdominal tenderness.   Musculoskeletal:         General: No deformity.      Cervical back: Neck supple.   Skin:     General: Skin is warm.   Neurological:      Mental Status: He is alert.       ***    ED Course, Procedures, & Data      Procedures            EKG Interpretation:      Interpreted by Stephan Cooper MD  Time reviewed: 15:51  Symptoms at time of EKG: hypoxia and low hemoglobin   Rhythm: Sinus rhythm with premature atrial complexes  Rate: 74  Axis: Normal  Ectopy: none  Conduction: normal  ST Segments/ T Waves: No ST-T wave changes  Q Waves: none  Comparison to prior: No old EKG available    Clinical Impression: Minimal voltage criteria for LVH, may be normal variant (R in aVL)          {ED Sepsis CMS Documentation (Optional):397906::\" \"}          Medications - No data to display       {Critical Care Performed?:108638}    Assessment & Plan    ***    I have reviewed the nursing notes. I have reviewed the findings, diagnosis, plan and need for follow up with the patient.    New Prescriptions    No medications on file       Final diagnoses:   None   IJosiah, am serving as a trained medical scribe to document services personally performed by Stephan Cooper MD, based on the provider's statements to me.     IStephan MD, was physically present and have reviewed and verified the accuracy of this note documented by Josiah Barahona.      Stephan Cooper MD  [facility] EMERGENCY DEPARTMENT  7/30/2025

## 2025-07-30 NOTE — PROGRESS NOTES
Brief Medicine Note:    Closely following orthostatic hypotension with PT, OT. Patient had been having early therapy sessions and not eating prior. Orthostatic hypotension resolved with eating yesterday. Likely due to mild hypovolemia in the morning. OT will move session to be later in the day and allow for breakfast prior to therapy sessions. Patient saw Dr. Sarahy Hinkle 7/28, no major changes but she suggested OP follow up with Dr. Perry after TCU discharge. Patient will have walk test today and overnight oximetry for eval for O2 needs on discharge. Further cares per prior progress note by writer.    DAMASO MurrietaC  Internal Medicine [facility]  Please contact via [employer]

## 2025-07-30 NOTE — DISCHARGE SUMMARY
[facility] Transitional Care  Hospitalist Discharge Summary      Date of Admission: 7/21/2025  Date of Discharge: 7/30/2025  Discharging Provider: Gabbi Denise PA-C  Discharge Service: Hospitalist Service    Discharge Diagnoses   Acute on chronic anemia  Transient acute on chronic hypoxic respiratory failure  Nausea  Rhinovirus infection  Possible COPD  HFpEF with acute decompensation  CAD s/p CABG  NSTEMI (2014)  Orthostatic hypotension   FSGS s/p renal transplant x2 (1978, 1993)   Chronic HBV   Depression  Anxiety  Insomnia  GERD  Glaucoma   H/o alcohol use disorder  H/o PE  H/o MITZI  Moderate malnutrition     Clinically Significant Risk Factors     # Moderate Malnutrition: based on nutrition assessment and treatment provided per dietitian's recommendations.      Follow-ups Needed After Discharge   Follow-up Appointments       ADULT [facility] Specialty Follow-up and recommended labs and tests      Follow up with ED physician for higher level of care    Appointments on [place] and/or [facility] (with [facility] or [facility] provider or service). Call [phone] if you haven't heard regarding these appointments within 7 days of discharge.                Unresulted Labs Ordered in the Past 30 Days of this Admission       Date and Time Order Name Status Description    7/30/2025 12:51 PM Haptoglobin In process         These results will be followed up by admitting team    Discharge Disposition   Discharged to Emergency department   Condition at discharge: Guarded    Hospital Course   Jerad Ross is a 65 year old male with history of NSTEMI (2014), FSGS s/p DDKT (1978, 1993), HTN, chronic anemia, PE, chronic HBV, and COPD who was admitted to [facility] 7/9 - 7/21/25 with acute hypoxic respiratory failure 2/2 rhinovirus and newly diagnosed HFpEF with acute decompensation. Transferred to  TCU 7/21/25 for physical rehabilitation.     Acute on chronic anemia  Elevated troponin, likely demand ischemia: On 7/30,  "patient developed new onset nausea around 0600 then had transient hypoxic event with OT while in bed. Labs revealed hgb 8.4 (12.3 7/24) without overt e/o bleeding. Hgb added onto 0620 labs and confirmed drop. Trop 36. Lytes, glucose stable. LFTs, WBC, plts normal. Denied chest pain, diaphoresis, prior dyspnea resolved. Nausea as below. EKG PACs in [facility]. Due to acute anemia, patient transferred to [place] ED for higher level of care  - LDH, haptoglobin, repeat trop, INR, peripheral smear pending  - Further workup per ED  - Sister, Reva, updated at patient's request     Nausea  Transient acute on subacute hypoxic respiratory failure: New onset nausea 7/30. No emesis. Having regular BMs. No abdominal pain. Later, he was to work with OT but developed dyspnea. O2 sat noted at 80% on BL 2L, then improved to 95% with deep breathing. Felt \"off\" during OT but denies overt dizziness or lightheadedness. Not back to BL yet but continues to feel better. No chest pain, dyspnea, diaphoresis. On eval, O2 98% on BL 2L O2. HR 80. Management and further workup as above     Acute hypoxic respiratory failure  Rhinovirus infection  Possible COPD  H/o COPD noted in chart but PFTs appeared more c/w mild restrictive lung disease. Presented with dyspnea and hypoxia initially requiring BiPAP. CT chest revealed multifocal GGOs, negative for PE. RVP +for rhinovirus. HFpEF exacerbation also contributing to AHRF, diuresed with IV Lasix. TTE also revealed new LV diastolic dysfunction and he was diuresed with IV Lasix  - Continue Breo Ellipta, prn albuterol   - Continue droplet precautions until symptoms resolved  - O2 PRN to maintain SpO2 >90%  - Consider repeat PFTs as outpatient     HFpEF with acute decompensation  CAD s/p CABG  NSTEMI (2014)  New diagnosis. TTE 7/14 grade I LV diastolic dysfunction, EF 55-65%. CXR with pulmonary edema. Diuresed with IV Lasix. EDW ~152 lbs. Currently appears euvolemic  - Continue Jardiance (started " inpatient), Metoprolol, Imdur, ASA 81 daily, PTA statin  - Needs Cardiology follow-up, referral placed but appointment not yet scheduled     FSGS s/p renal transplant x2 (, ): Renal function stable. No acute issues this admission   - Continue PTA prednisone,  mg bid (dose initially  due to rhinovirus, resumed PTA dosing )     Other Stable Medical Issues:  Orthostatic hypotension: Resolved.  doing OT prior to having breakfast. No further events  after OT was moved to later in the day  Physical deconditioning: PT, OT followed at TCU  Chronic HBV: Continue PTA entecavir  Depression, anxiety: Continue fluoxetine, prn hydroxyzine  Insomnia: Melatonin prn  GERD: Continue PPI   Glaucoma: Continue PTA latanoprost drops  Alcohol use disorder, in remission: Continue naltrexone  H/o PE: Provoked after hip surgery in 2023. Not maintained on AC  H/o MITZI: Has visit with Dr. Bartholomew on   Moderate malnutrition: Low threshold for nutrition consult. Continue snacks/supplements with meals    Consultations This Hospital Stay   PT, OT, CC, spiritual health    Code Status   No CPR- Do NOT Intubate    Time Spent on this Encounter   I, Gabbi Denise PA-C, personally saw the patient today and spent greater than 30 minutes discharging this patient.       Gabbi Denise PA-C  [facility] TRANSITIONAL CARE UNIT [address] [address]  Phone: [phone]  ______________________________________________________________________    Physical Exam   Vital Signs: Temp: 98.1  F (36.7  C) Temp src: Oral BP: 120/60 Pulse: 77   Resp: 18 SpO2: 95 % (pt SATS were 80% at rest, increased to 95% on 2L w/ th directing pt to implement PLB and 5 min  rest between assessments, supine) O2 Device: Nasal cannula Oxygen Delivery: 2 LPM  Weight: 156 lbs 9.6 oz  General Appearance: Alert and oriented x3, pleasant  HEENT: Anicteric sclera, MMM   Respiratory: Breathing  comfortably on 1L NC, lungs CTAB without wheezing or crackles   Cardiovascular: RRR, S1, S2. No murmur noted  GI: Abdomen soft, non-tender with active bowel sounds. No guarding or rebound  Lymph/Hematologic: No peripheral edema, distal pulses palpable   Skin: No rash or jaundice   Musculoskeletal: Moves all extremities   Neurologic: No focal deficits, CN II-XII grossly intact       Primary Care Physician   Aston Perry    Discharge Orders      Adult Cardiology Eval  Referral      Reason for your hospital stay    [facility] after a COPD exacerbation. You develop nausea and shortness of breath requiring further workup. You were found to have low hemoglobin concerning for bleeding. Due to this, you discharged to the emergency department for higher level of care     Activity    Your activity upon discharge: activity as tolerated     ADULT [facility] Specialty Follow-up and recommended labs and tests    Follow up with ED physician for higher level of care    Appointments on [place] and/or [facility] (with [facility] or [facility] provider or service). Call [phone] if you haven't heard regarding these appointments within 7 days of discharge.     No CPR- Do NOT Intubate     Diet    Follow this diet upon discharge: Current Diet:Orders Placed This Encounter      Snacks/Supplements Adult: Ensure Plus High Protein; Between Meals      Combination Diet Low Saturated Fat Sodium <2400mg Diet       Significant Results and Procedures   Most Recent 3 CBC's:  Recent Labs   Lab Test 07/30/25  1224 07/30/25  0620 07/29/25  0624 07/24/25  0631   WBC 8.9 9.7  --  6.8   HGB 8.2* 8.4*  --  12.3*   MCV 93 92 91 92    300 302 418     Most Recent 3 BMP's:  Recent Labs   Lab Test 07/30/25  1224 07/30/25  0620 07/28/25  0611 07/27/25  0607 07/24/25  0631     --  137  --  139   POTASSIUM 4.2  --  3.8  --  4.4   CHLORIDE 100  --  102  --  101   CO2 25  --  24   --  27   BUN 23.4*  --  32.8*  --  10.1   CR 0.70 0.70 0.61*   < > 0.75   ANIONGAP 11  --  11  --  11   HEMA 8.2*  --  8.9  --  9.1   *  --  100*  --  82    < > = values in this interval not displayed.     Most Recent 2 LFT's:  Recent Labs   Lab Test 07/30/25  1224 07/09/25  1038   AST 21 49*   ALT 14 23   ALKPHOS 50 82   BILITOTAL 0.5 1.0     Most Recent 3 INR's:  Recent Labs   Lab Test 07/09/25  1038 06/30/25  1517 10/31/24  1237   INR 1.03 0.94 1.03       Discharge Medications      Review of your medicines        START taking        Dose / Directions   melatonin 5 MG tablet      Dose: 5 mg  Take 1 tablet (5 mg) by mouth nightly as needed for sleep.  Refills: 0     sodium chloride 0.65 % nasal spray  Commonly known as: OCEAN      Dose: 1 spray  Spray 1 spray into both nostrils every hour as needed for congestion.  Refills: 0            CHANGE how you take these medications        Dose / Directions   * acetaminophen 650 MG suppository  Commonly known as: TYLENOL  This may have changed: You were already taking a medication with the same name, and this prescription was added. Make sure you understand how and when to take each.      Dose: 650 mg  Place 1 suppository (650 mg) rectally every 4 hours as needed for mild pain or fever (greater than 101 degrees.).  Refills: 0     * acetaminophen 500 MG tablet  Commonly known as: TYLENOL  This may have changed: Another medication with the same name was added. Make sure you understand how and when to take each.      Dose: 500-1,000 mg  Take 500-1,000 mg by mouth every 6 hours as needed for mild pain or fever.  Refills: 0     fluticasone-salmeterol 250-50 MCG/ACT inhaler  Commonly known as: ADVAIR  This may have changed:   when to take this  reasons to take this  Used for: Dyspnea on exertion, Wheezing      Dose: 1 puff  Inhale 1 puff into the lungs 2 times daily  Quantity: 180 each  Refills: 3     mycophenolate 250 MG capsule  Commonly known as: GENERIC  EQUIVALENT  Indication: Kidney Transplant Recipient  This may have changed:   how much to take  when to take this  Another medication with the same name was removed. Continue taking this medication, and follow the directions you see here.      Dose: 750 mg  Take 3 capsules (750 mg) by mouth.  Refills: 0           * This list has 2 medication(s) that are the same as other medications prescribed for you. Read the directions carefully, and ask your doctor or other care provider to review them with you.                CONTINUE these medicines which have NOT CHANGED        Dose / Directions   albuterol 108 (90 Base) MCG/ACT inhaler  Commonly known as: ProAir HFA  Used for: Wheezing      Dose: 2 puff  Inhale 2 puffs into the lungs every 6 hours as needed for shortness of breath / dyspnea or wheezing  Quantity: 1 g  Refills: 11     aspirin 81 MG EC tablet  Used for: Multiple subsegmental pulmonary emboli without acute cor pulmonale (H)      Dose: 81 mg  Take 1 tablet (81 mg) by mouth daily  Refills: 0     budesonide 90 MCG/ACT inhaler  Commonly known as: PULMICORT FLEXHALER      Dose: 2 puff  Inhale 2 puffs into the lungs 2 times daily as needed (shortness of breath)  Refills: 0     CALCIUM CITRATE + D PO      Dose: 1 tablet  Take 1 tablet by mouth daily.  Refills: 0     clindamycin 150 MG capsule  Commonly known as: CLEOCIN      TAKE 2 CAPSULES BY MOUTH 1 HOUR PRIOR TO DENTAL APPOINTMENT. TAKE 1 CAPSULE BY MOUTH EVERY 6 HOURS AFTER APPOINTMENT  Refills: 0     Diabetic Tussin Max St  MG/5ML Liqd  Generic drug: Dextromethorphan-guaiFENesin      Dose: 10-30 mL  Take 10-30 mLs by mouth every 6 hours as needed (cough).  Refills: 0     empagliflozin 10 MG Tabs tablet  Commonly known as: JARDIANCE  Used for: Heart failure with preserved ejection fraction, NYHA class I (H)      Dose: 10 mg  Take 1 tablet (10 mg) by mouth daily.  Quantity: 90 tablet  Refills: 1     entecavir 0.5 MG tablet  Commonly known as: BARACLUDE  Used  for: Chronic hepatitis B (H), Chronic viral hepatitis B without delta agent and without coma (H)      Dose: 0.5 mg  Take 1 tablet (0.5 mg) by mouth daily.  Quantity: 90 tablet  Refills: 3     * FLUoxetine 20 MG capsule  Commonly known as: PROzac  Used for: Recurrent major depressive disorder, in partial remission      Dose: 20 mg  Take 1 capsule (20 mg) by mouth daily. With 40mg for a total daily dose of 60mg  Quantity: 90 capsule  Refills: 3     * FLUoxetine 40 MG capsule  Commonly known as: PROzac  Used for: Recurrent major depressive disorder, in partial remission      Dose: 40 mg  Take 1 capsule (40 mg) by mouth daily.  Quantity: 90 capsule  Refills: 3     furosemide 20 MG tablet  Commonly known as: LASIX  Used for: Dyspnea on exertion      Dose: 20 mg  Take 1 tablet (20 mg) by mouth daily as needed (fluid retention)  Quantity: 90 tablet  Refills: 1     * hydrOXYzine HCl 10 MG tablet  Commonly known as: ATARAX  Used for: Recurrent major depressive disorder, in partial remission      Dose: 10 mg  Take 1 tablet (10 mg) by mouth daily as needed for anxiety  Quantity: 30 tablet  Refills: 1     * hydrOXYzine HCl 25 MG tablet  Commonly known as: ATARAX  Used for: Insomnia, unspecified type, Generalized anxiety disorder      Dose: 25 mg  Take 1 tablet (25 mg) by mouth nightly as needed for anxiety (sleep)  Quantity: 90 tablet  Refills: 1     isosorbide mononitrate 60 MG 24 hr tablet  Commonly known as: IMDUR  Used for: S/P CABG (coronary artery bypass graft)      Dose: 60 mg  Take 1 tablet (60 mg) by mouth daily.  Quantity: 90 tablet  Refills: 2     latanoprost 0.005 % ophthalmic solution  Commonly known as: XALATAN  Used for: Borderline glaucoma with ocular hypertension, bilateral      Dose: 1 drop  Place 1 drop into both eyes At Bedtime  Quantity: 2.5 mL  Refills: 11     metoprolol succinate ER 25 MG 24 hr tablet  Commonly known as: TOPROL XL  Used for: CAD -- S/P CABG x 3 in 2020      Dose: 12.5 mg  Take 0.5 tablets  (12.5 mg) by mouth daily.  Quantity: 45 tablet  Refills: 2     naltrexone 50 MG tablet  Commonly known as: DEPADE/REVIA  Used for: Alcohol use disorder      Dose: 50 mg  Take 1 tablet (50 mg) by mouth daily.  Quantity: 30 tablet  Refills: 2     nitroGLYcerin 0.4 MG sublingual tablet  Commonly known as: NITROSTAT  Used for: Coronary arteriosclerosis due to lipid rich plaque      Dose: 0.4 mg  Place 1 tablet (0.4 mg) under the tongue every 5 minutes as needed for chest pain  Quantity: 20 tablet  Refills: 3     OMEGA 3 PO      Dose: 1 capsule  Take 1 capsule by mouth daily Dose unknown  Refills: 0     One Daily Multivitamin/Iron Tabs      Dose: 1 tablet  Take 1 tablet by mouth daily  Refills: 0     pantoprazole 40 MG EC tablet  Commonly known as: PROTONIX  Used for: Gastroesophageal reflux disease without esophagitis      Dose: 40 mg  TAKE 1 TABLET BY MOUTH EVERY DAY  Quantity: 90 tablet  Refills: 3     polyethylene glycol 17 g packet  Commonly known as: MIRALAX      Dose: 17 g  Take 17 g by mouth daily as needed  Refills: 0     predniSONE 5 MG tablet  Commonly known as: DELTASONE  Used for: Kidney transplanted      Dose: 5 mg  Take 1 tablet (5 mg) by mouth daily.  Quantity: 90 tablet  Refills: 3     rosuvastatin 20 MG tablet  Commonly known as: CRESTOR  Used for: Pure hypercholesterolemia      Dose: 20 mg  Take 1 tablet (20 mg) by mouth daily.  Quantity: 90 tablet  Refills: 3     white petrolatum gel  Used for: On supplemental oxygen by nasal cannula, Skin irritation      Apply topically daily as needed for dry skin (Apply to nose while using the nasal cannula to prevent nose bleeds).  Quantity: 113 g  Refills: 3           * This list has 4 medication(s) that are the same as other medications prescribed for you. Read the directions carefully, and ask your doctor or other care provider to review them with you.                Allergies   Allergies   Allergen Reactions    Penicillins Shortness Of Breath and Hives      Occurred in childhood     Keflex [Cephalexin Hcl] Unknown     Patient could not recall reaction to Keflex  Per chart, has tolerated cefazolin (2023)    Sulfa Antibiotics Rash    Tetracycline Unknown     Patient could not recall reaction, childhood reaction

## 2025-07-30 NOTE — PLAN OF CARE
"Goal Outcome Evaluation:  No acute issues overnight. A&Ox4. Mod I. Appears sleeping well as noted during rounds. Continent B&B, used urinal indep.- staff emptied, LBM=07/28. Has no c/o's pain, discomfort, CP/SOB on RA. Overnight oximetry ordered 07/30 @ 2200. Planning discharge home Fri.08/01.    0655-Pt.called c/o \"can't quite catch my breath\". O2 sat 96% RA. Applied O2 via NC @ 2L for comfort.  /66 (BP Location: Right arm)   Pulse 77   Temp 98.1  F (36.7  C) (Oral)   Resp 18  SpO2 99% 2L NC.           Patient's most recent vital signs are:     Vital signs:  BP: 102/63  Temp: 97.6  HR: 75  RR: 20  SpO2: 95 %     Patient does not have new respiratory symptoms.  Patient does not have new sore throat.  Patient does not have a fever greater than 99.5.                                   "

## 2025-07-30 NOTE — DISCHARGE INSTRUCTIONS
Home Health  [facility]- [phone] Fax-[phone]  -Nurse, physical therapy, and occupational therapy,    You will get a call after you have discharged from Transitional care unit to schedule the first home care visit. The home health nurse should come out within the first 24-48 hours. If you don't get a call from them within the first 48 hours, please call to follow up (number above).

## 2025-07-30 NOTE — H&P
"[facility]    History and Physical - Hospitalist Service, GOLD TEAM        Date of Admission:  7/30/2025    Assessment & Plan    Jerad Ross is a 65 year old male with a past medical history of NSTEMI (2014), FSGS s/p DDKT (1978, 1993), HTN, chronic anemia, PE, chronic HBV, and COPD who was recently admitted to [facility] 7/9 - 7/21/25 with acute hypoxic respiratory failure 2/2 rhinovirus and newly diagnosed HFpEF with acute decompensation. He was transferred to  TCU 7/21/25 for physical rehabilitation. He now returns to [facility] for evaluation of acute on chronic anemia.     Acute on Chronic Anemia  Elevated troponin, likely Demand Ischemia - On 7/30, patient developed new onset nausea around 0600 then had transient hypoxic event with OT while in bed. Labs revealed hgb 8.4 (12.3 7/24) without overt e/o bleeding. Hgb added onto 0620 labs and confirmed drop. Trop 36. Lytes, glucose stable. LFTs, WBC, plts normal. Denied chest pain, diaphoresis, prior dyspnea resolved. Nausea as below. EKG PACs in [facility]. Due to acute anemia, patient transferred to [facility] for higher level of care. , Haptoglobin 242. Suspicions for GI bleeding vs hemolysis vs other.   -Agree with CT PE Abdomen Pelvis w/ contrast   -NPO midnight, GI consult for AM for consideration of endoscopy  -Follow peripheral smear  -Flat troponin trend  -patient consented for blood, in chart    Nausea  Transient Acute on Subacute Hypoxic Respiratory Failure - New onset nausea 7/30. No emesis. Having regular BMs. No abdominal pain. Later, he was to work with OT but developed dyspnea. O2 sat noted at 80% on BL 2L, then improved to 95% with deep breathing. Felt \"off\" during OT but denies overt dizziness or lightheadedness. Not back to BL yet but continues to feel better. No chest pain, dyspnea, diaphoresis. On eval, O2 98% on BL 2L O2. HR 80. Patient remains on 2Ls baseline oxygen during ED eval.  Repeat CXR " concerning for possible developing pneumona  -CT as above      Acute on Chronic Hypoxic Respiratory Failure  Rhinovirus infection  Possible COPD - H/o COPD noted in chart but PFTs appeared more c/w mild restrictive lung disease. Presented with dyspnea and hypoxia initially requiring BiPAP. CT chest revealed multifocal GGOs, negative for PE. RVP +for rhinovirus. HFpEF exacerbation also contributing to AHRF, diuresed with IV Lasix. TTE also revealed new LV diastolic dysfunction and he was diuresed with IV Lasix  - Continue Breo Ellipta, prn albuterol   - Continue droplet precautions until symptoms resolved  - O2 PRN to maintain SpO2 >90%  - Consider repeat PFTs as outpatient  - CTPE     HFpEF with Acute Decompensation  CAD s/p CABG  NSTEMI () - New diagnosis. TTE  grade I LV diastolic dysfunction, EF 55-65%. CXR with pulmonary edema. Diuresed with IV Lasix. EDW ~152 lbs. Currently appears euvolemic  - Continue Jardiance, ASA 81 daily, PTA statin  -Hold metoprolol and Imdur in case of underlying bleeding   - Needs Cardiology follow-up, referral placed but appointment not yet scheduled     FSGS s/p renal transplant x2 (, ) - Renal function stable. No acute issues this admission   - Continue PTA prednisone,  mg bid (dose initially  due to rhinovirus, resumed PTA dosing )     Other Stable Medical Issues:  Orthostatic hypotension: Resolved. 2/2 doing OT prior to having breakfast. No further events  after OT was moved to later in the day  Physical deconditioning: PT, OT consult  Chronic HBV: Continue PTA entecavir  Depression, anxiety: Continue fluoxetine, prn hydroxyzine  Insomnia: Melatonin prn  GERD: Continue PPI   Glaucoma: Continue PTA latanoprost drops  Alcohol use disorder, in remission: Continue naltrexone  H/o PE: Provoked after hip surgery in 2023. Not maintained on AC  H/o MITZI: Has visit with Dr. Bartholomew on   Moderate malnutrition: Low threshold for nutrition  "consult. Continue snacks/supplements with meals            Diet:  NPO midnight   DVT Prophylaxis: Pneumatic Compression Devices  [place]wood Catheter: Not present  Lines: None     Cardiac Monitoring: None  Code Status:  DNR/DNI    Clinically Significant Risk Factors Present on Admission               # Hypoalbuminemia: Lowest albumin = 2.8 g/dL at 7/30/2025 12:24 PM, will monitor as appropriate   # Drug Induced Platelet Defect: home medication list includes an antiplatelet medication   # Hypertension: Noted on problem list      # Anemia: based on hgb <11                  Disposition Plan     Medically Ready for Discharge: Anticipated in 2-4 Days         The patient's care was discussed with the Attending Physician, Dr. Bustamante.    Abby Goddard PA-C  Hospitalist Service, [facility]  Securely message with [employer] (more info)  Text page via [employer] Paging/Directory   See signed in provider for up to date coverage information    ______________________________________________________________________    Chief Complaint   Anemia     History is obtained from the patient and patient's chart    History of Present Illness   Jerad Ross is a 65 year old male with a PMH as listed above who presents from  TCU with acute on chronic anemia. Patient with new onset nausea, lightheadedness and dyspnea this morning prompting lab check showing worsening anemia.  Patient denies any changes in his stools, including hematochezia or melena. States he feels he's \"recovered\" from this morning's events and is now back to his baseline.  He denies any current dyspnea, cough, nausea, vomiting, abdominal pain, change in bowel or urinary habits.       Past Medical History    Past Medical History:   Diagnosis Date    Acute midline low back pain without sciatica     AION (acute ischemic optic neuropathy)     Anemia in chronic renal disease     Anxiety     Arthritis 1978    Post transplant    " Avascular necrosis of bones of both hips (H)     s/p bilateral hip replacements    Avascular necrosis of bones of both hips (H)     s/p bilateral hip replacements    Basal cell carcinoma     Cataract     Chronic hepatitis B (H)     Chronic osteoarthritis 1978    Post transplant    Clostridium difficile colitis     COPD (chronic obstructive pulmonary disease) (H)     Coronary artery disease involving native coronary artery of native heart without angina pectoris 06/17/2014    Coronary angiogram 6/17/14: Severe distal 3-vessel disease involving small vessels, not amenable to PCI or CABG.    CRP elevated     Depression     Depressive disorder 1978    Post transplant    Dialysis patient     Diverticulosis     Dyslipidemia     Elevated C-reactive protein (CRP)     FSGS (focal segmental glomerulosclerosis)     FSGS (focal segmental glomerulosclerosis)     Gastric ulcer with hemorrhage 02/12/2012    Gastric ulcer with hemorrhage 02/12/2012    GERD (gastroesophageal reflux disease)     Glaucoma     OHTN    Glaucoma     Hemorrhoids     History of blood transfusion     HTN (hypertension)     Hyperlipidemia 1978    Hypertension secondary to other renal disorders     Hypogonadism in male     Immunosuppressed status     Immunosuppression     Kidney replaced by transplant     focal glomerulosclerosis     Kidney transplanted     focal glomerulosclerosis     NSTEMI (non-ST elevated myocardial infarction) (H) 06/17/2014    NSTEMI (non-ST elevated myocardial infarction) (H) 06/17/2014    JULIANE (obstructive sleep apnea)     Doesn't use CPAP    Paracentral scotoma     LE    Renal failure     Secondary renal hyperparathyroidism     Septic bursitis     Squamous cell carcinoma     Uncomplicated asthma 2003    Well controled       Past Surgical History   Past Surgical History:   Procedure Laterality Date    APPENDECTOMY      ARTHROPLASTY REVISION HIP Right 06/19/2023    Procedure: RIGHT HIP REVISION;  Surgeon: Juan Mcdonough MD;   Location: [facility] OR    BIOPSY      Cardiac Bypass surgery  06/08/2020    [facility]     CATARACT EXTRACTION EXTRACAPSULAR W/ INTRAOCULAR LENS IMPLANTATION Bilateral 4-20-10, 6-1-10    CATARACT IOL, RT/LT  04/19/2000    RE    CATARACT IOL, RT/LT  06/01/2000    LE    COLECTOMY PARTIAL  01/01/1983     10 cm, diverticulitis     COLECTOMY SUBTOTAL  1983    10 cm, diverticulitis    COLONOSCOPY  02/13/2012    Procedure:COLONOSCOPY; Surgeon:SLOAN GALLARDO; Location: GI    COLONOSCOPY N/A 01/22/2020    Procedure: Colonoscopy, With Polypectomy And Biopsy;  Surgeon: Aston Kiran MD;  Location:  GI    COLONOSCOPY N/A 06/05/2025    Procedure: Colonoscopy;  Surgeon: Lina Claros MD;  Location:  GI    CV CORONARY ANGIOGRAM N/A 06/02/2020    Procedure: Coronary Angiogram;  Surgeon: Alona Florentino MD;  Location: [facility] Cath Lab;  Service: Cardiology    CV CORONARY ANGIOGRAM N/A 09/20/2021    Procedure: Coronary Angiogram;  Surgeon: Adam Agudelo MD;  Location: [facility]    CV LEFT HEART CATHETERIZATION WITHOUT LEFT VENTRICULOGRAM Left 06/02/2020    Procedure: Left Heart Catheterization Without Left Ventriculogram;  Surgeon: Alona Florentino MD;  Location: [facility] Cath Lab;  Service: Cardiology    CV SUPRAVALVULAR AORTOGRAM N/A 09/20/2021    Procedure: Supravalvular Aortagram;  Surgeon: Adam Agudelo MD;  Location: [facility] CV    ESOPHAGOSCOPY, GASTROSCOPY, DUODENOSCOPY (EGD), COMBINED  02/13/2012    Procedure:COMBINED ESOPHAGOSCOPY, GASTROSCOPY, DUODENOSCOPY (EGD); Surgeon:SLOAN GALLARDO; Location: GI    ESOPHAGOSCOPY, GASTROSCOPY, DUODENOSCOPY (EGD), COMBINED  02/13/2012    EXTRACAPSULAR CATARACT EXTRATION WITH INTRAOCULAR LENS IMPLANT  4-20-10, 6-1-10    Rt, Lt    HERNIA REPAIR  1995    Lt inguinal    HERNIA REPAIR  1995    Lt inguinal    HIP SURGERY      1981, bilat MITZI, revised 2001, 2005    HIP SURGERY  01/01/1981    bilat MITZI,  revised 2001, 2005    IR FINE NEEDLE ASPIRATION W ULTRASOUND  04/10/2023    JOINT REPLACEMENT      KIDNEY SURGERY  1978 and 1993    transplant    KNEE SURGERY  1983, 1987, 2001    bilat TKA    MOHS MICROGRAPHIC PROCEDURE      MOHS MICROGRAPHIC PROCEDURE      ORTHOPEDIC SURGERY      OTHER SURGICAL HISTORY      kidney [id number], 1993    PHACOEMULSIFICATION CLEAR CORNEA WITH STANDARD INTRAOCULAR LENS IMPLANT Right 04/19/2000    PHACOEMULSIFICATION CLEAR CORNEA WITH STANDARD INTRAOCULAR LENS IMPLANT Left 06/01/2000    NH BOWEL TO BOWEL ANASTOMOSIS      NH CARDIAC SURG PROCEDURE UNLISTED  2020    3x bypass    NH PELVIS/HIP JOINT SURGERY UNLISTED  1981, 1996, 2005, 2023    Both hips, L 1x rev. R 2x rev.    NH STOMACH SURGERY PROCEDURE UNLISTED  1978    Splenectomy    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, auxiliary spleen    TONSILLECTOMY      TRANSPLANT      VASCULAR SURGERY  1976       Prior to Admission Medications   Prior to Admission Medications   Prescriptions Last Dose Informant Patient Reported? Taking?   Calcium Citrate-Vitamin D (CALCIUM CITRATE + D PO)   Yes No   Sig: Take 1 tablet by mouth daily.   Dextromethorphan-guaiFENesin (DIABETIC TUSSIN MAX ST)  MG/5ML LIQD   Yes No   Sig: Take 10-30 mLs by mouth every 6 hours as needed (cough).   FLUoxetine (PROZAC) 20 MG capsule   No No   Sig: Take 1 capsule (20 mg) by mouth daily. With 40mg for a total daily dose of 60mg   FLUoxetine (PROZAC) 40 MG capsule   No No   Sig: Take 1 capsule (40 mg) by mouth daily.   Multiple Vitamins-Iron (ONE DAILY MULTIVITAMIN/IRON) TABS  Self Yes No   Sig: Take 1 tablet by mouth daily   Omega-3 Fatty Acids (OMEGA 3 PO)  Self Yes No   Sig: Take 1 capsule by mouth daily Dose unknown   acetaminophen (TYLENOL) 500 MG tablet   Yes No   Sig: Take 500-1,000 mg by mouth every 6 hours as needed for mild pain or fever.   acetaminophen (TYLENOL) 650 MG suppository   Yes No   Sig: Place 1 suppository (650 mg) rectally every 4 hours as needed  for mild pain or fever (greater than 101 degrees.).   albuterol (PROAIR HFA) 108 (90 Base) MCG/ACT inhaler  Self No No   Sig: Inhale 2 puffs into the lungs every 6 hours as needed for shortness of breath / dyspnea or wheezing   aspirin 81 MG EC tablet   No No   Sig: Take 1 tablet (81 mg) by mouth daily   budesonide (PULMICORT FLEXHALER) 90 MCG/ACT inhaler   Yes No   Sig: Inhale 2 puffs into the lungs 2 times daily as needed (shortness of breath)   clindamycin (CLEOCIN) 150 MG capsule   Yes No   Sig: TAKE 2 CAPSULES BY MOUTH 1 HOUR PRIOR TO DENTAL APPOINTMENT. TAKE 1 CAPSULE BY MOUTH EVERY 6 HOURS AFTER APPOINTMENT   empagliflozin (JARDIANCE) 10 MG TABS tablet   No No   Sig: Take 1 tablet (10 mg) by mouth daily.   entecavir (BARACLUDE) 0.5 MG tablet   No No   Sig: Take 1 tablet (0.5 mg) by mouth daily.   fluticasone-salmeterol (ADVAIR) 250-50 MCG/ACT inhaler   No No   Sig: Inhale 1 puff into the lungs 2 times daily   furosemide (LASIX) 20 MG tablet  Self No No   Sig: Take 1 tablet (20 mg) by mouth daily as needed (fluid retention)   hydrOXYzine HCl (ATARAX) 10 MG tablet   No No   Sig: Take 1 tablet (10 mg) by mouth daily as needed for anxiety   hydrOXYzine HCl (ATARAX) 25 MG tablet   No No   Sig: Take 1 tablet (25 mg) by mouth nightly as needed for anxiety (sleep)   isosorbide mononitrate (IMDUR) 60 MG 24 hr tablet   No No   Sig: Take 1 tablet (60 mg) by mouth daily.   latanoprost (XALATAN) 0.005 % ophthalmic solution  Self No No   Sig: Place 1 drop into both eyes At Bedtime   melatonin 5 MG tablet   Yes No   Sig: Take 1 tablet (5 mg) by mouth nightly as needed for sleep.   metoprolol succinate ER (TOPROL XL) 25 MG 24 hr tablet   No No   Sig: Take 0.5 tablets (12.5 mg) by mouth daily.   mycophenolate (GENERIC EQUIVALENT) 250 MG capsule   Yes No   Sig: Take 3 capsules (750 mg) by mouth.   naltrexone (DEPADE/REVIA) 50 MG tablet   No No   Sig: Take 1 tablet (50 mg) by mouth daily.   nitroGLYcerin (NITROSTAT) 0.4 MG  sublingual tablet  Self No No   Sig: Place 1 tablet (0.4 mg) under the tongue every 5 minutes as needed for chest pain   pantoprazole (PROTONIX) 40 MG EC tablet   No No   Sig: TAKE 1 TABLET BY MOUTH EVERY DAY   polyethylene glycol (MIRALAX) 17 g packet  Self Yes No   Sig: Take 17 g by mouth daily as needed    predniSONE (DELTASONE) 5 MG tablet   No No   Sig: Take 1 tablet (5 mg) by mouth daily.   rosuvastatin (CRESTOR) 20 MG tablet   No No   Sig: Take 1 tablet (20 mg) by mouth daily.   sodium chloride (OCEAN) 0.65 % nasal spray   Yes No   Sig: Spray 1 spray into both nostrils every hour as needed for congestion.   white petrolatum gel   No No   Sig: Apply topically daily as needed for dry skin (Apply to nose while using the nasal cannula to prevent nose bleeds).      Facility-Administered Medications: None        Physical Exam   Vital Signs: Temp: 99.9  F (37.7  C) Temp src: Oral BP: 106/67 Pulse: 75     SpO2: 98 % O2 Device: None (Room air) Oxygen Delivery: 2 LPM  Weight: 0 lbs 0 oz    GENERAL: Alert and oriented x 3. NAD. Cooperative.   HEENT: Anicteric sclera. Mucous membranes moist. NC. AT.   CV: RRR. S1, S2. No murmurs appreciated.   RESPIRATORY: Effort normal on 2Ls. Lungs CTAB with no wheezing, rales, rhonchi. Diminished in bilateral bases  GI: Abdomen soft and non distended with normoactive bowel sounds present in all quadrants. No tenderness.   NEUROLOGICAL: No focal deficits. Moves all extremities. .  EXTREMITIES: No peripheral edema.   SKIN: No jaundice. No rashes.      Medical Decision Making       75 MINUTES SPENT BY ME on the date of service doing chart review, history, exam, documentation & further activities per the note.      Data   Recent Labs   Lab 07/30/25  1633 07/30/25  1404 07/30/25  1400 07/30/25  1224 07/30/25  0620 07/29/25  0624 07/28/25  0611   WBC 7.7  --  8.0 8.9 9.7  --   --    HGB 8.0*  --  7.9* 8.2* 8.4*  --   --    MCV 96  --  94 93 92   < >  --      --  272 287 300   < >  --     INR 1.09 1.10  --   --   --   --   --      --   --  136  --   --  137   POTASSIUM 4.5  --   --  4.2  --   --  3.8   CHLORIDE 99  --   --  100  --   --  102   CO2 28  --   --  25  --   --  24   BUN 20.9  --   --  23.4*  --   --  32.8*   CR 0.71  --   --  0.70 0.70  --  0.61*   ANIONGAP 8  --   --  11  --   --  11   HEMA 8.6*  --   --  8.2*  --   --  8.9   *  --   --  137*  --   --  100*   ALBUMIN 3.0*  --   --  2.8*  --   --   --    PROTTOTAL 5.3*  --   --  5.1*  --   --   --    BILITOTAL 0.5  --   --  0.5  --   --   --    ALKPHOS 48  --   --  50  --   --   --    ALT 11  --   --  14  --   --   --    AST 26  --   --  21  --   --   --     < > = values in this interval not displayed.

## 2025-07-30 NOTE — PLAN OF CARE
Goal Outcome Evaluation:      Plan of Care Reviewed With: patient    Overall Patient Progress: no changeOverall Patient Progress: no change       VS: /63 (BP Location: Right arm)   Pulse 75   Temp 97.6  F (36.4  C) (Oral)   Resp 20   Wt 71.1 kg (156 lb 12.8 oz)   SpO2 95%   BMI 24.56 kg/m       O2: 94% RA   Output: Continent of bowel and bladder, uses urinal   Last BM: 7/27/2025   Activity: Independent in room with walker    Skin: Scattered bruises on bilateral upper extremities   Pain: denies   CMS: A&O x4    Dressing: none   Diet: Regular/Thin   LDA: none   Equipment: Walker, oxygen    Plan: Continue POC   Additional Info: Patient alert and oriented, able to make needs known, call light in reach and bed in lowest position. Denied any n/v or chest pain, no acute events this shift.

## 2025-07-30 NOTE — PROGRESS NOTES
07/30/25 0921   Appointment Info   Signing Clinician's Name / Credentials (OT) Bethany Abernathy, OTR/L,STONEY   Rehab Comments (OT) --52 min due to nausea, fatigue, feelig ill   OT Goals   Therapy Frequency (OT) 5 times/week   OT Predicted Duration/Target Date for Goal Attainment 07/31/25   Self-Care/Home Management   Self-Care/Home Mgmt/ADL, Compensatory, Meal Prep Minutes (34694) 8   Treatment Detail/Skilled Intervention OT: monitored vitals, pt fatigued, nauseated, SOB, discussed pt status and pt POC, pt declined all options for today's session , monitored BP and sATS, see vitals doc flow for details, assisted pt w/ comfortable position   OT Discharge Planning   OT Plan OT: precautions: droplet, falls, NC oxygen, mild cog impairments, Rx:resp educ w/ adls, recommend 4WW , pt is modified indep in in room/bathroom w/ 4WW, shoes and oxygen,last scheduled OT day 7/31 for shower but pt cx 7/30 due to nausea/feeling ill, SOB so may need to consider extending for medical issues or safety issues   OT Discharge Recommendation (DC Rec) home with assist   OT Rationale for DC Rec antic pt may benefit from commode overlay or RTS at d/c due to pt has std ht toilet and no grabbar, pt has tub/shower comb w/ grabbars and ext tub bench, FWW and reacher at home, provided pt handout for RTS w/ handles and bassic commode for commode overlay on toilet, pt reports having somewhere at home toilet safety frame but it does not assist w/ ht of toilet. addendum 7/28:pt stated he has toilet safety frame at home he can have installed for him but if needs more assist he is aware of options for RTS or commode overlay to purchase,no other AE needs identified at this time   OT Brief overview of current status OT: pt session limited due to c/o nausea, fatigue, SOB, and not feeling well overall, pt scheduled for last day therapy tomorrow but will adjust PRN due to medical status if pt not safe to d/c   Total Session Time   Timed Code Treatment  Minutes 8   Total Session Time (sum of timed and untimed services) 8   Post Acute Settings Only   What unit is patient on? TCU   OT- Transitional Care Unit Time   Individual Time (minutes) - OT 8   TCU Total Session Time (minutes) - OT 8   TCU Daily Total Session Time   OT TCU Daily Total Session Time 8   Rehab TCU Daily Total Session Time 8

## 2025-07-30 NOTE — PLAN OF CARE
Occupational Therapy Discharge Summary    Reason for therapy discharge:    Change in medical status.    Progress towards therapy goal(s). See goals on Care Plan in [facility] electronic health record for goal details.  Goals partially met.  Barriers to achieving goals:   discharge from facility.and medical issues including nausea,fatigue, low BP and low SATS    Therapy recommendation(s):    Pt was progressing w/ adls/mob until medical issues limited performance, pt discharging back to hospital per provider due to medical issues.

## 2025-07-30 NOTE — PROGRESS NOTES
"Internal Medicine Progress Note    Assessment and plan:  Medicine asked to see patient for nausea, hypoxia. Please see progress note from 7/28 for further medical details    Nausea  Transient acute on chronic hypoxic respiratory failure: Patient developed new onset nausea around 0600 today. No emesis. Having regular BMs. No abdominal pain. Later, he was to work with OT but developed dyspnea. O2 sat noted at 80% on BL 2L, then improved to 95% with deep breathing. Felt \"off\" during OT but denies overt dizziness or lightheadedness. Not back to BL yet but continues to feel better. No chest pain, dyspnea, diaphoresis. Currently 98% on BL 2L O2. HR 80. Unclear etiology for symptoms and if nausea is related to dyspneic/hypoxic event  - CBC, CMP, Mg, trop, VBG  - CXR  - EKG  - If recurrent event, will obtain CT PE. Holding off for now given O2 sats at BL, HR normal, no current dyspnea/chest pain/palpations, and that patient is on Lovenox DVT ppx     ** Addendum: CBC back with hgb 8.2 (12.3 7/24). WBC and plts normal. EKG with PACs in [facility]. VBG 7.39/51/27/31. Lytes stable. LFTs normal. Trop 36 (32 7/9/25).  Patient currently at CXR and will reassess immediately upon arrival back to TCU  - Stat repeat CBC. Will attempt to add onto morning Cr as well. If hgb truly 8.2, patient will need to transfer to higher level of care  - Check LHD, haptoglobin, peripheral smear  - Repeat trop in 2 hours    Objective:  /60 (BP Location: Right arm, Patient Position: Supine, Cuff Size: Adult Regular)   Pulse 77   Temp 98.1  F (36.7  C) (Oral)   Resp 18   Wt 71 kg (156 lb 9.6 oz)   SpO2 95%   BMI 24.53 kg/m      Vitals signs reviewed and noted    GENERAL: Alert and oriented x3, sitting up in bed. Conversational and pleasant. In NAD  HEENT: Anicteric sclera. MMM  CV: RRR, S1, S2. No murmur noted  RESPIRATORY: Effort normal on 2L NC. Lungs CTAB with no wheezing or rales  GI: Abdomen soft, non-tender with active bowel sounds. No " "rebound or guarding  NEUROLOGICAL: No focal deficits. Moves all extremities  EXTREMITIES: No peripheral edema. Intact bilateral pedal pulses  SKIN: No jaundice, no rash    Pertinent labs and procedures were reviewed     Subjective:   Patient developed new nausea without abdominal pain, emesis, or change in bowels this morning. While working with OT, he became hypoxic and had some shortness of breath. He did deep breathing and sats normalized. He felt \"off\" to do OT session but denied dizziness or lightheadedness. No chest pain, palpitations. Denies diaphoresis. No abdominal pain, diarrhea, constipation, or urinary symptoms. Noted mild headache that he gets from time to time. No visual changes or blurred vision.     Gabbi Denise PA-C  Internal Medicine  Please contact via hetras    "

## 2025-07-31 LAB
PATH REPORT.COMMENTS IMP SPEC: NORMAL
PATH REPORT.COMMENTS IMP SPEC: NORMAL
PATH REPORT.FINAL DX SPEC: NORMAL
PATH REPORT.MICROSCOPIC SPEC OTHER STN: NORMAL
PATH REPORT.MICROSCOPIC SPEC OTHER STN: NORMAL
PATH REPORT.RELEVANT HX SPEC: NORMAL

## 2025-07-31 ASSESSMENT — ACTIVITIES OF DAILY LIVING (ADL)
ADLS_ACUITY_SCORE: 56
ADLS_ACUITY_SCORE: 57
ADLS_ACUITY_SCORE: 56
ADLS_ACUITY_SCORE: 56
ADLS_ACUITY_SCORE: 57
ADLS_ACUITY_SCORE: 58
ADLS_ACUITY_SCORE: 56
ADLS_ACUITY_SCORE: 57
ADLS_ACUITY_SCORE: 56
ADLS_ACUITY_SCORE: 56
ADLS_ACUITY_SCORE: 57
ADLS_ACUITY_SCORE: 57
ADLS_ACUITY_SCORE: 56
ADLS_ACUITY_SCORE: 57
ADLS_ACUITY_SCORE: 58
ADLS_ACUITY_SCORE: 57
ADLS_ACUITY_SCORE: 57
ADLS_ACUITY_SCORE: 56
ADLS_ACUITY_SCORE: 57
ADLS_ACUITY_SCORE: 56
ADLS_ACUITY_SCORE: 57

## 2025-07-31 NOTE — PROGRESS NOTES
"[facility]    Medicine Progress Note - Hospitalist Service, GOLD TEAM 6    Date of Admission:  7/30/2025    Assessment & Plan   Jerad Ross is a 65 year old male with history of FSGS s/p DDKT (1978, 1993), HTN, CAD s/p CABG, NSTEMI (2014), PE, ?COPD vs restrictive lung disease, chronic HBV, GERD, MDD, anxiety, AUD (in remission), chronic anemia, and recent admission 7/9/25 for AHRF due to rhinovirus and newly diagnosed HFpEF, discharged to [facility] on 7/21/25 who presents with acute anemia of unclear etiology. Admitted to medicine for further evaluation and management.     Updates for 7/31/2025:  - GI consulted  - NPO for EGD later today  - Serial Hgb, transfuse if <7.0  - Wean O2 as able  - Will need repeat PT/OT consults when stable for dispo planning    # Acute on Chronic Anemia -  Developed new onset nausea on 7/30 then had transient hypoxic event. Repeat labs revealed Hgb 8.4 (previously 12.3 on 7/24) without overt e/o bleeding. Remainder of work up negative for etiology of symptoms. Suspect secondary to acute anemia and possible occult bleed. Hemolysis felt less likely with normal LDH, bilirubin, and Haptoglobin. Concern for possible upper GI bleed given symptoms of nausea. On steroids + PPI. No NSAID use. CT AP negative. Hgb 8.4 --> 7.4. Still no signs of active bleeding.   - GI consulted, planning EGD later today  - NPO for procedure  - Follow peripheral smear  - Repeat Hgb at 2000, CBC in AM  - Patient consented for blood, in chart, transfuse if Hgb <7.0    # Elevated troponin, likely Demand Ischemia -  Trop 36 - 36 - 35.  EKG negative for ischemic changes.   - Monitor clinically  - Repeat EKG and Trop if having chest pain     # Transient Acute on Subacute Hypoxic Respiratory Failure - New onset nausea 7/30. No emesis. Later, he was to work with OT but developed dyspnea. O2 sat noted at 80% on BL 2L, then improved to 95% with deep breathing. Felt \"off\" " during OT but denies overt dizziness or lightheadedness. CT Chest PE negative for PE, showed improved multifocal GGO throughout lungs. Currently back to baseline. Denies any cardiopulmonary complaints. Afebrile. WBC normal. New infectious process not suspected.  - CTM  - Wean O2 as able     Acute on Chronic Hypoxic Respiratory Failure  Rhinovirus infection  Possible COPD - H/o COPD noted in chart but PFTs appeared more c/w mild restrictive lung disease. Presented with dyspnea and hypoxia initially requiring BiPAP. CT chest revealed multifocal GGOs, negative for PE. RVP +for rhinovirus. HFpEF exacerbation also contributing to AHRF, diuresed with IV Lasix. TTE also revealed new LV diastolic dysfunction and he was diuresed with IV Lasix  - Continue Breo Ellipta, prn albuterol   - Continue droplet precautions until symptoms resolved  - O2 PRN to maintain SpO2 >90%  - Consider repeat PFTs as outpatient     HFpEF with Acute Decompensation  CAD s/p CABG  NSTEMI () - New diagnosis. TTE  grade I LV diastolic dysfunction, EF 55-65%. CXR with pulmonary edema. Diuresed with IV Lasix. EDW ~152 lbs. Currently appears euvolemic  - Continue Jardiance, ASA 81 daily, PTA statin  - Hold metoprolol and Imdur in case of underlying bleeding   - Needs Cardiology follow-up, referral placed but appointment not yet scheduled     FSGS s/p renal transplant x2 (, ) - Renal function stable. No acute issues this admission   - Continue PTA prednisone,  mg bid (dose initially  due to rhinovirus, resumed PTA dosing )     Other Stable Medical Issues:  Orthostatic hypotension: Resolved. /2 doing OT prior to having breakfast. No further events  after OT was moved to later in the day  Physical deconditioning: PT, OT consult  Chronic HBV: Continue PTA entecavir  Depression, anxiety: Continue fluoxetine, prn hydroxyzine  Insomnia: Melatonin prn  GERD: Continue PPI   Glaucoma: Continue PTA latanoprost drops  Alcohol  use disorder, in remission: Continue naltrexone  H/o PE: Provoked after hip surgery in 8/2023. Not maintained on AC  H/o MITZI: Has visit with Dr. Bartholomew on 8/13  Moderate malnutrition: Low threshold for nutrition consult. Continue snacks/supplements with meals               Diet: Advance Diet as Tolerated: Clear Liquid Diet; Regular Diet Adult    DVT Prophylaxis: Pneumatic Compression Devices  [place] Catheter: Not present  Lines: None     Cardiac Monitoring: None  Code Status: No CPR- Do NOT Intubate      Clinically Significant Risk Factors Present on Admission               # Hypoalbuminemia: Lowest albumin = 2.8 g/dL at 7/30/2025 12:24 PM, will monitor as appropriate   # Drug Induced Platelet Defect: home medication list includes an antiplatelet medication   # Hypertension: Noted on problem list      # Anemia: based on hgb <11                  Social Drivers of Health    Tobacco Use: Medium Risk (7/30/2025)    Patient History     Smoking Tobacco Use: Former     Smokeless Tobacco Use: Former     Passive Exposure: Never   Physical Activity: Insufficiently Active (11/5/2024)    Exercise Vital Sign     Days of Exercise per Week: 5 days     Minutes of Exercise per Session: 20 min   Stress: Stress Concern Present (11/5/2024)    [employer] [place] [employer] - Occupational Stress Questionnaire     Feeling of Stress : To some extent   Social Connections: Unknown (11/5/2024)    Social Connection and Isolation Panel [NHANES]     Frequency of Social Gatherings with Friends and Family: More than three times a week          Disposition Plan     Medically Ready for Discharge: Anticipated Tomorrow           The patient's care was discussed with the Attending Physician, Dr. Cheung.    Bebeto Jimenez PA-C  Hospitalist Service, GOLD TEAM [address] [place] [address]  Securely message with [employer] (more info)  Text page via [facility] Paging/Directory   See signed in provider for up to date  "coverage information  ______________________________________________________________________    Interval History   No acute events overnight. In general feeling better than yesterday, but still feeling \"washed out.\" Breathing back to baseline. No pain. No nausea, vomiting, diarrhea, melena, hematochezia. Last BM was 2 days ago.     Physical Exam   Vital Signs: Temp: 98  F (36.7  C) Temp src: Oral BP: 104/69 Pulse: 74   Resp: 20 SpO2: 100 % O2 Device: Nasal cannula Oxygen Delivery: 2 LPM  Weight: 0 lbs 0 oz    General Appearance:  Awake. Alert. Oriented x3. NAD.   HEENT:  Unremarkable.   Respiratory:  Normal effort. CTAB. No wheezing, rhonchi, rales.   Cardiovascular:  RRR. S1/S2. No murmurs.   GI:  Soft, non-distended, non-tender, +BS.   Extremity:  No pitting edema.   Skin:  No visible rash.   Neuro:  Grossly non-focal.      Medical Decision Making       50 MINUTES SPENT BY ME on the date of service doing chart review, history, exam, documentation & further activities per the note.      Data     I have personally reviewed the following data over the past 24 hrs:    7.3  \   7.4 (L)   / 266     136 101 15.8 /  73   3.7 25 0.74 \     ALT: 12 AST: 23 AP: 45 TBILI: 0.5   ALB: 2.9 (L) TOT PROTEIN: 5.2 (L) LIPASE: N/A     Trop: 46 (H) BNP: 470 (H)     INR:  1.12 PTT:  33   D-dimer:  N/A Fibrinogen:  N/A     Ferritin:  122 % Retic:  N/A LDH:  N/A       Imaging results reviewed over the past 24 hrs:   Recent Results (from the past 24 hours)   POC US ECHO LIMITED    Impression    Limited Bedside Cardiac Ultrasound, performed and interpreted by me.   Indication: Shortness of Breath.  Parasternal long axis, parasternal short axis, and apical 4 chamber views were acquired.   Image quality was satisfactory.    Findings:    Global left ventricular function appears intact.  Chambers do not appear dilated.  There is no evidence of free fluid within the pericardium.    IMPRESSION: Grossly normal left ventricular function and " chamber size.  No pericardial effusion. B lines on right side.      POC US ECHO LIMITED    Impression    Cancel study. Duplicate order.   XR Chest 2 Views    Narrative    EXAM: XR CHEST 2 VIEWS  LOCATION: [facility]  DATE: 7/30/2025    INDICATION: sob  COMPARISON: 7/30/2025      Impression    IMPRESSION: Median sternotomy. Normal heart size. Patchy left basilar parenchymal opacity, suspicious for developing pneumonia on the lateral view. Clinical correlation and follow-up advised. No pneumothorax. Advanced degenerative changes in both   shoulders, left greater than right.   CT Chest PE Abdomen Pelvis w Contrast    Narrative    EXAMINATION: CT CHEST PE ABDOMEN PELVIS W CONTRAST, 7/30/2025 7:59 PM    TECHNIQUE:  Helical CT images from the thoracic inlet through the  symphysis pubis were obtained with IV contrast in the early arterial  phase in the chest and portal venous phase in the abdomen and pelvis.  Contrast dose: 96 mL [id number]    COMPARISON: CT 7/19/2025, MR 7/13/2015    HISTORY: Hgb drop, hypoxia. ?PNA on CXR.    FINDINGS:  CHEST:  THYROID: Thyroid is unremarkable.    LUNGS/PLEURA: Improved multifocal groundglass attenuation throughout  the lungs. No mass or consolidation. No pleural effusion or  pneumothorax. The airway is patent.    MEDIASTINUM: Heart size is within normal limits. No pericardial  effusion. No suspicious mediastinal lymphadenopathy.  The esophagus is  unremarkable.    CHEST WALL: Sternotomy wires in place. No suspicious axillary  lymphadenopathy.    VESSELS: No evidence for acute pulmonary embolism. No evidence for an  heart strain or significant reflux into the IVC.    ABDOMEN/PELVIS:  LIVER: Subcentimeter hypodensity in the hepatic segment 7, too small  to characterize. Widening of the fissures.    BILIARY: Normal appearance of the gallbladder. No intra- or  extrahepatic biliary dilation.    PANCREAS: No focal pancreatic mass or ductal  dilation.    SPLEEN: Splenectomy with residual tissue.    ADRENAL GLANDS: Within normal limits.    URINARY TRACT: Near complete atrophy of the bilateral kidneys. Post  interval changes of left lower quadrant kidney transplant. There is  also a failed heavily calcified right lower quadrant renal transplant.  No hydronephrosis or suspicious bladder wall thickening.     REPRODUCTIVE ORGANS: Limited due to streak artifact from hip  arthroplasties.    STOMACH: Within normal limits.    BOWEL: Colonic diverticulosis, most marked in the descending colon  region. Normal caliber of the small and large bowel.      PERITONEUM/FLUID: No focal fluid collection or evidence of free fluid  or free air.    VESSELS: No aneurysmal dilatation of the abdominal aorta.  The portal,  splenic, and superior mesenteric veins are patent.  The origins of the  celiac and superior mesenteric arteries are patent.    LYMPH NODES: No lymphadenopathy.    BONES/SOFT TISSUES: No aggressive osseous lesions. Severe shoulder  degenerative changes. Total hip arthroplasties. Fatty atrophy of the  right psoas muscles. Calcification in the right paraspinal region may  be related to previous hematoma in this region (13/197).      Impression    IMPRESSION:  1. No acute pulmonary embolism. Improved multifocal groundglass  opacities throughout the lungs.   2. No acute pathology in the abdomen or pelvis. Incidentals as  described above.    I have personally reviewed the examination and initial interpretation  and I agree with the findings.    MONSTER ORDAZ MD         SYSTEM ID:  [id number]

## 2025-07-31 NOTE — CONSULTS
[facility]  Transplant Nephrology Consult Note  Date of Admission:  7/30/2025  Today's Date: 07/31/2025  Requesting physician: Ron Cheung MD    Reason for Consult:  Kidney transplant   Immunosuppression management   Acute on chronic anemia    Recommendations:   - continue on current immunosuppression   - no acute indications for dialysis   - Agree with anemia work up per primary   - Iron studies and ferrtin added to his labs.    Assessment & Plan   # DDKT: Stable   - Baseline Creatinine: ~ 0.7-0.9   - Proteinuria: Normal (<0.2 grams)   - DSA Hx: Not checked recently due to time from transplant   - Last cPRA: unknown%   - BK Viremia: Not checked recently due to time from transplant   - Kidney Tx Biopsy Hx: No history of acute rejection.    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     # Immunosuppression: Mycophenolate mofetil (dose 750 mg every 12 hours) and Prednisone (dose 5 mg daily)   - Induction with Recent Transplant:  Not known due to time from transplant   - Continue with intensive monitoring of immunosuppression for efficacy and toxicity.   - Historical Changes in Immunosuppression: None   - Changes: Not at this time    # Infection Prevention:   Last CD4 Level: Not checked  - PJP: None      - CMV IgG Ab High Risk Discordance (D+/R-) at time of transplant: Unknown  Present CMV Serostatus: Negative  - EBV IgG Ab High Risk Discordance (D+/R-) at time of transplant: Unknown  Present EBV Serostatus: Unknown    # Hypertension: Hypotensive;  Goal BP: > 100, but < 130 systolic   - Changes: Not at this time    # Diabetes: Controlled (HbA1c <7%)  Last HbA1c: 6% last in chart is in 2022    # Anemia in Chronic Renal Disease: Hgb: Trend down  in setting of probable GI bleeding    CAROL ANN: No   - Iron studies: Low iron saturation, will order iron studies    # Mineral Bone Disorder:    - Vitamin D; level: Not checked recently, but was normal last check         On supplement: No  - Calcium; level: Normal        On supplement: No    # Electrolytes:  - Potassium; level: Normal        On supplement: No  - Bicarbonate; level: Normal        On supplement: No  - Sodium; level: Normal    # Acute hypoxic respiratory failure:  # Pulmonary edema, resolved.   # Viral versus Atypical Pneumonia: + Rhino/enterovirus. CT PE negative. Gram + Bcx on 07/09, likely contaminant. Repeat blood cultures 07/10. Sputum culture 7/12 likely contaminated, per report. Repeat pending. CXR 7/13 with pulmonary edema. Started on ceftriaxone and currently on prednisone 40 mg daily and doxycycline.               - CXR 7/16 with pulmonary edema and bilateral pleural effusions.   - Management per primary team. Pulmonology following.     # Acute decompensated heart failure: echo in 2023 with no evidence of HF. BNP 1800 on 7/13. PTA on 20 mg lasix PO daily.               - Echo 7/14; EF 55-65% with grade I diastolic dysfunction. No pericardial effusion.              - Management per primary team.     # Other Significant PMH:   - CAD, s/p CABG: Last cardiac stress test was abnormal in 6/2023 (but unchanged from prior testing in 2022).  Coronary angiogram 9/2021 that showed some possible obstructive disease in the 1st diagonal, but unable to stent it.  Bypass grafts were open.  Plan is for medical management. Last cardiac echo (8/2023) showed LVEF ~ 60-65%. Normal right ventricular systolic function. Normal diastolic dysfunction.               - Provoked PE (8/2023): after right hip revision surgery. Completed AC.   - Chronic Hepatitis B: Stable on entecavir. Follows here with hepatology.   - Asthma: Some increase in shortness of breath, but felt more cardiac in origin.  Patient is stable on inhalers.   - GERD: Asymptomatic on pantoprazole, but with h/o ulcer, will continue on medication.   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    Harriet Hyde MD  Transplant Nephrology  Contact information via [facility] Paging/Directory    History of Present Illness  Jerad Ross is a 65 year old male with a past medical history of NSTEMI (2014), FSGS s/p DDKT (1978, 1993), HTN, chronic anemia, PE, chronic HBV, and COPD who was recently admitted to [facility] 7/9 - 7/21/25 with acute hypoxic respiratory failure 2/2 rhinovirus and newly diagnosed HFpEF with acute decompensation. He was transferred to  TCU 7/21/25 for physical rehabilitation. He now returns to [facility] for evaluation of acute on chronic anemia. Nephrology consulted for immunosuppression management.  Pt endosed feeling sleepy and fatigued post procedure, but denied being in pain or having SOB. Comfotable in bed. But endorsed he was having slowly worsening BP's and fatigue and his PT was not great for past 24 to 48 hours prior to presentation. No N/V/D noted today.    Review of Systems   The 10 point Review of Systems is negative other than noted in the HPI or here.      MEDICATIONS:  Current Facility-Administered Medications   Medication Dose Route Frequency Provider Last Rate Last Admin    aspirin EC tablet 81 mg  81 mg Oral Daily Abby Goddard PA-C   81 mg at 07/31/25 0800    empagliflozin (JARDIANCE) tablet 10 mg  10 mg Oral Daily Abby Goddard PA-C   10 mg at 07/31/25 0800    entecavir (BARACLUDE) tablet 0.5 mg  0.5 mg Oral Daily Abby Goddard PA-C   0.5 mg at 07/31/25 0800    FLUoxetine (PROzac) capsule 60 mg  60 mg Oral Daily Abby Goddard PA-C   60 mg at 07/31/25 0801    fluticasone-vilanterol (BREO ELLIPTA) 100-25 MCG/ACT inhaler 1 puff  1 puff Inhalation Daily Abby Goddard PA-C   1 puff at 07/31/25 0801    [Held by provider]  isosorbide mononitrate (IMDUR) 24 hr tablet 60 mg  60 mg Oral Daily Abby Goddard PA-C        latanoprost (XALATAN) 0.005 % ophthalmic solution 1 drop  1 drop Both Eyes At Bedtime Abby Goddard PA-C   1 drop at 25    [Held by provider] metoprolol succinate ER (TOPROL-XL) 24 hr half-tab 12.5 mg  12.5 mg Oral Daily Abby Goddard PA-C        multivitamin w/minerals (THERA-VIT-M) tablet 1 tablet  1 tablet Oral Daily Abby Goddard PA-C   1 tablet at 25 0800    mycophenolate (GENERIC EQUIVALENT) capsule 750 mg  750 mg Oral BID IS Abby Goddard PA-C   750 mg at 25 0800    naltrexone (DEPADE/REVIA) tablet 50 mg  50 mg Oral Daily Abby Goddard PA-C   50 mg at 25 0801    pantoprazole (PROTONIX) EC tablet 40 mg  40 mg Oral BID AC Abby Goddard PA-C   40 mg at 25 0800    predniSONE (DELTASONE) tablet 5 mg  5 mg Oral Daily Abby Goddard PA-C   5 mg at 25 0800    rosuvastatin (CRESTOR) tablet 20 mg  20 mg Oral Daily Abby Goddard PA-C   20 mg at 25 0800    sodium chloride (PF) 0.9% PF flush 3 mL  3 mL Intracatheter Q8H NE Abby Goddard PA-C         Current Facility-Administered Medications   Medication Dose Route Frequency Provider Last Rate Last Admin       Physical Exam   Temp  Av.8  F (36.6  C)  Min: 97.3  F (36.3  C)  Max: 99.9  F (37.7  C)      Pulse  Av.9  Min: 53  Max: 98 Resp  Av.3  Min: 16  Max: 20  SpO2  Av.7 %  Min: 77 %  Max: 100 %     /61   Pulse 85   Temp 98  F (36.7  C) (Oral)   Resp 18   SpO2 94%    Date 25 07 - 25 0659   Shift 5283-0100 7019-1999 4834-3652 24 Hour Total   INTAKE   Shift Total       OUTPUT   Urine 400   400   Shift Total 400   400   Weight (kg)          Admit       GENERAL APPEARANCE: sleepy but easily arousable and no distress  EYES: eyes grossly normal to inspection  HENT: normal cephalic/atraumatic  RESP: lungs clear to auscultation  CV: regular rhythm, normal rate  EDEMA: trace LE  edema bilaterally  ABDOMEN: soft, nondistended, nontender  MS: extremities normal - no gross deformities noted  NEURO: mentation intact and speech normal  PSYCH: mentation appears normal and affect normal/bright  TX KIDNEY: normal    Data   All labs reviewed by me.  CMP  Recent Labs   Lab 07/31/25  0536 07/30/25  1633 07/30/25  1224 07/30/25  0620 07/28/25  0611    135 136  --  137   POTASSIUM 3.7 4.5 4.2  --  3.8   CHLORIDE 101 99 100  --  102   CO2 25 28 25  --  24   ANIONGAP 10 8 11  --  11   GLC 73 107* 137*  --  100*   BUN 15.8 20.9 23.4*  --  32.8*   CR 0.74 0.71 0.70 0.70 0.61*   GFRESTIMATED >90 >90 >90 >90 >90   HEMA 8.8 8.6* 8.2*  --  8.9   MAG  --   --  1.8  --   --    PROTTOTAL 5.2* 5.3* 5.1*  --   --    ALBUMIN 2.9* 3.0* 2.8*  --   --    BILITOTAL 0.5 0.5 0.5  --   --    ALKPHOS 45 48 50  --   --    AST 23 26 21  --   --    ALT 12 11 14  --   --      CBC  Recent Labs   Lab 07/31/25  0536 07/30/25  1633 07/30/25  1400 07/30/25  1224   HGB 7.4* 8.0* 7.9* 8.2*   WBC 7.3 7.7 8.0 8.9   RBC 2.54* 2.70* 2.72* 2.81*   HCT 24.2* 25.8* 25.6* 26.0*   MCV 95 96 94 93   MCH 29.1 29.6 29.0 29.2   MCHC 30.6* 31.0* 30.9* 31.5   RDW 16.8* 16.6* 16.3* 16.5*    289 272 287     INR  Recent Labs   Lab 07/31/25  0536 07/30/25  1633 07/30/25  1404   INR 1.12 1.09 1.10   PTT  --  33  --      ABG  Recent Labs   Lab 07/30/25  1731 07/30/25  1224   O2PER 29 21      Urine Studies  Recent Labs   Lab Test 07/09/25  1346 08/30/23  1856 08/20/23  1245 08/11/23  1928 06/03/20  1307   COLOR Yellow Yellow Light Yellow Light Yellow Yellow   APPEARANCE Slightly Cloudy* Clear Clear Clear Clear   URINEGLC Negative Negative Negative Negative Negative   URINEBILI Negative Negative Negative Negative Negative   URINEKETONE Trace* 10* 60* 10* Negative   SG 1.015 1.021 1.015 1.009 1.008   UBLD Negative Negative Negative Negative Negative   URINEPH 6.0 5.0 6.0 6.0 6.5   PROTEIN 20* 10* 20* 10* Negative   UROBILINOGEN  --   --   --   --   <2.0 E.U./dL   NITRITE Negative Negative Negative Negative Negative   LEUKEST Negative Negative Negative Negative Negative   RBCU 1 <1 <1 0  --    WBCU 7* 3 1 3  --      Recent Labs   Lab Test 10/28/20  0902 10/01/19  0942 04/02/19  0917 10/23/18  1122 06/21/18  1209   UTPG 0.23* 0.26* 0.23* 0.21* 0.12     PTH  No lab results found.  Iron Studies  Recent Labs   Lab Test 10/02/23  0943   IRON 30*      IRONSAT 10*   LA 50       IMAGING:  All imaging studies reviewed by me.    Past Medical History    I have reviewed this patient's medical history and updated it with pertinent information if needed.   Past Medical History:   Diagnosis Date    Acute midline low back pain without sciatica     AION (acute ischemic optic neuropathy)     Anemia in chronic renal disease     Anxiety     Arthritis 1978    Post transplant    Avascular necrosis of bones of both hips (H)     s/p bilateral hip replacements    Avascular necrosis of bones of both hips (H)     s/p bilateral hip replacements    Basal cell carcinoma     Cataract     Chronic hepatitis B (H)     Chronic osteoarthritis 1978    Post transplant    Clostridium difficile colitis     COPD (chronic obstructive pulmonary disease) (H)     Coronary artery disease involving native coronary artery of native heart without angina pectoris 06/17/2014    Coronary angiogram 6/17/14: Severe distal 3-vessel disease involving small vessels, not amenable to PCI or CABG.    CRP elevated     Depression     Depressive disorder 1978    Post transplant    Dialysis patient     Diverticulosis     Dyslipidemia     Elevated C-reactive protein (CRP)     FSGS (focal segmental glomerulosclerosis)     FSGS (focal segmental glomerulosclerosis)     Gastric ulcer with hemorrhage 02/12/2012    Gastric ulcer with hemorrhage 02/12/2012    GERD (gastroesophageal reflux disease)     Glaucoma     OHTN    Glaucoma     Hemorrhoids     History of blood transfusion     HTN (hypertension)     Hyperlipidemia  1978    Hypertension secondary to other renal disorders     Hypogonadism in male     Immunosuppressed status     Immunosuppression     Kidney replaced by transplant     focal glomerulosclerosis     Kidney transplanted     focal glomerulosclerosis     NSTEMI (non-ST elevated myocardial infarction) (H) 06/17/2014    NSTEMI (non-ST elevated myocardial infarction) (H) 06/17/2014    JULIANE (obstructive sleep apnea)     Doesn't use CPAP    Paracentral scotoma     LE    Renal failure     Secondary renal hyperparathyroidism     Septic bursitis     Squamous cell carcinoma     Uncomplicated asthma 2003    Well controled       Past Surgical History   I have reviewed this patient's surgical history and updated it with pertinent information if needed.  Past Surgical History:   Procedure Laterality Date    APPENDECTOMY      ARTHROPLASTY REVISION HIP Right 06/19/2023    Procedure: RIGHT HIP REVISION;  Surgeon: Juan Mcdonough MD;  Location: [facility] OR    BIOPSY      Cardiac Bypass surgery  06/08/2020    Merna's     CATARACT EXTRACTION EXTRACAPSULAR W/ INTRAOCULAR LENS IMPLANTATION Bilateral 4-20-10, 6-1-10    CATARACT IOL, RT/LT  04/19/2000    RE    CATARACT IOL, RT/LT  06/01/2000    LE    COLECTOMY PARTIAL  01/01/1983     10 cm, diverticulitis     COLECTOMY SUBTOTAL  1983    10 cm, diverticulitis    COLONOSCOPY  02/13/2012    Procedure:COLONOSCOPY; Surgeon:SLOAN GALLARDO; Location:[facility]    COLONOSCOPY N/A 01/22/2020    Procedure: Colonoscopy, With Polypectomy And Biopsy;  Surgeon: Aston Kiran MD;  Location: [facility]    COLONOSCOPY N/A 06/05/2025    Procedure: Colonoscopy;  Surgeon: Lina Claros MD;  Location:  GI    CV CORONARY ANGIOGRAM N/A 06/02/2020    Procedure: Coronary Angiogram;  Surgeon: Alona Florentino MD;  Location: [facility] Cath Lab;  Service: Cardiology    CV CORONARY ANGIOGRAM N/A 09/20/2021    Procedure: Coronary Angiogram;  Surgeon: Adam Agudelo MD;  Location: [facility]  CATH LAB CV    CV LEFT HEART CATHETERIZATION WITHOUT LEFT VENTRICULOGRAM Left 06/02/2020    Procedure: Left Heart Catheterization Without Left Ventriculogram;  Surgeon: Alona Florentino MD;  Location: [facility] Cath Lab;  Service: Cardiology    CV SUPRAVALVULAR AORTOGRAM N/A 09/20/2021    Procedure: Supravalvular Aortagram;  Surgeon: Adam Agudelo MD;  Location: [facility] CATH LAB CV    ESOPHAGOSCOPY, GASTROSCOPY, DUODENOSCOPY (EGD), COMBINED  02/13/2012    Procedure:COMBINED ESOPHAGOSCOPY, GASTROSCOPY, DUODENOSCOPY (EGD); Surgeon:SLOAN GALLARDO; Location: GI    ESOPHAGOSCOPY, GASTROSCOPY, DUODENOSCOPY (EGD), COMBINED  02/13/2012    EXTRACAPSULAR CATARACT EXTRATION WITH INTRAOCULAR LENS IMPLANT  4-20-10, 6-1-10    Rt, Lt    HERNIA REPAIR  1995    Lt inguinal    HERNIA REPAIR  1995    Lt inguinal    HIP SURGERY      1981, bilat MITZI, revised 2001, 2005    HIP SURGERY  01/01/1981    bilat MITZI, revised 2001, 2005    IR FINE NEEDLE ASPIRATION W ULTRASOUND  04/10/2023    JOINT REPLACEMENT      KIDNEY SURGERY  1978 and 1993    transplant    KNEE SURGERY  1983, 1987, 2001    bilat TKA    MOHS MICROGRAPHIC PROCEDURE      MOHS MICROGRAPHIC PROCEDURE      ORTHOPEDIC SURGERY      OTHER SURGICAL HISTORY      kidney [id number], 1993    PHACOEMULSIFICATION CLEAR CORNEA WITH STANDARD INTRAOCULAR LENS IMPLANT Right 04/19/2000    PHACOEMULSIFICATION CLEAR CORNEA WITH STANDARD INTRAOCULAR LENS IMPLANT Left 06/01/2000    VT BOWEL TO BOWEL ANASTOMOSIS      VT CARDIAC SURG PROCEDURE UNLISTED  2020    3x bypass    VT PELVIS/HIP JOINT SURGERY UNLISTED  1981, 1996, 2005, 2023    Both hips, L 1x rev. R 2x rev.    VT STOMACH SURGERY PROCEDURE UNLISTED  1978    Splenectomy    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, auxiliary spleen    TONSILLECTOMY      TRANSPLANT      VASCULAR SURGERY  1976       Family History   I have reviewed this patient's family history and updated it with pertinent information if needed.   Family  History   Problem Relation Age of Onset    Asthma Mother     Arthritis Mother     Cardiovascular Father         AI with valve repair    Hypertension Father     Kidney Disease Father     Other - See Comments Father         AI with valve repair    Hyperlipidemia Father     Heart Disease Father     Anxiety Disorder Maternal Grandmother     Depression Maternal Grandmother     Breast Cancer Maternal Grandmother     Substance Abuse Maternal Grandfather     Cerebrovascular Disease Maternal Grandfather     Other - See Comments Maternal Grandfather         cerebrovascular disease    Depression Maternal Grandfather     Dementia Maternal Grandfather     Heart Disease Maternal Grandfather     Cancer Paternal Grandmother         ovarian ca    Ovarian Cancer Paternal Grandmother     Depression Paternal Grandmother     Uterine Cancer Paternal Grandmother     Cerebrovascular Disease Paternal Grandfather     Dementia Paternal Grandfather     Heart Disease Paternal Grandfather     Kidney Disease Paternal Aunt     Kidney Disease Paternal Aunt     Skin Cancer No family hx of     Glaucoma No family hx of     Macular Degeneration No family hx of     Amblyopia No family hx of     Melanoma No family hx of     Diabetes No family hx of      Social History   I have reviewed this patient's social history and updated it with pertinent information if needed. Jerad BARRAGAN Vandana  reports that he quit smoking about 37 years ago. His smoking use included cigarettes. He started smoking about 45 years ago. He has a 9 pack-year smoking history. He has never been exposed to tobacco smoke. He has quit using smokeless tobacco. He reports that he does not currently use alcohol. He reports that he does not currently use drugs after having used the following drugs: Oxycodone.    Prior to Admission Medications   (Not in a hospital admission)

## 2025-07-31 NOTE — PROGRESS NOTES
Gastroenterology Endoscopy Suite Brief Operative Note    Procedure:  Upper endoscopy   Post-operative diagnosis:  Mild Gastritis   Staff Physician:  Dr. Washington   Fellow/Assistant(s):  Dr. Rose    Specimens:  Please see final procedure note for further details.   Findings:  Mild gastritis, hiatal hernia,   Complications:  None.   Condition:  Stable   Recommendations  Diet:  OK for diet per primary team  PPI:  PPI po once daily  Anti-coagulants/platelets:  OK for anti-coagulation from GI perspective  Octreotide:  N/A  Discharge Planning:   If evidence of further GI bleeding, would consider capsule study.

## 2025-07-31 NOTE — ED NOTES
Emergency Department I-PASS Sign-out      Illness Severity: Stable    Patient Summary:  65 year old male with pertinent PMH of CHF who presented with shortness of breath, melena, gen weakness.     ED Course/treatment plan: rectal without gross blood. Hgb decreasing. CT abd/pelvis pending.     Clinical Impression:  (R06.02) Shortness of breath  (primary encounter diagnosis)    (D64.9) Anemia, unspecified type      Action List:  -To do:  Labs: CMP, VBG, BNP  Imaging: CT  Admit call to medicine     Situational Awareness & Contingency Planning:  Code Status (Most recent):  Prior    Disposition:  likely do be admitted    Synthesis & Events after sign-out:  Chart review notable for transitional care unit labs and notes which noted the decreased hemoglobin.  Patient had labs performed earlier this day.  TCU notes also mentioned hypoxia, CT changed to include pulmonary angiogram.  CT without evidence of PE, did not visualized acute abdominal pathology.  Type and screen sent, pantoprazole given.  Patient admitted to medicine.        Medical Decision Making  The patient's presentation was of high complexity (an acute health issue posing potential threat to life or bodily function).    The patient's evaluation involved:  review of external note(s) from 1 sources (see separate area of note for details)  ordering and/or review of 3+ test(s) in this encounter (see separate area of note for details)  independent interpretation of testing performed by another health professional (CT)  Discussion of management with admitting hospitalist    The patient's management necessitated high risk (a decision regarding hospitalization).          Cong Malone MD   Emergency Medicine     Cong Malone MD  07/31/25 0036

## 2025-07-31 NOTE — PLAN OF CARE
Physical Therapy Discharge Summary    Reason for therapy discharge:    Change in medical status.    Progress towards therapy goal(s). [facility] for goal details.  Patient had been approaching meeting his goals prior to discharge, ambulating with walker, and performing most mobility tasks with supervision to mod I on room air, before decline in medical status causing pt to have to return to hospital.    Therapy recommendation(s):    Continued therapy is recommended.  Rationale/Recommendations:  recommend PT plan of care once medically appropriate.

## 2025-07-31 NOTE — MEDICATION SCRIBE - ADMISSION MEDICATION HISTORY
Medication Scribe Admission Medication History    Admission medication history is complete. The information provided in this note is only as accurate as the sources available at the time of the update.    Information Source(s): Patient and [facility] via in-person    Pertinent Information: per pt+[facility], pt reported taking medications on PTA medication list as directed, stated he had a regiment of Prednisone 5 mg tabs some time last month, but is currently taking daily.     Changes made to PTA medication list:  Added: None  Deleted: None  Changed: None    Allergies reviewed with patient and updates made in EHR: yes    Medication History Completed By: Yee Vergara 7/31/2025 6:13 AM    PTA Med List   Medication Sig Note Last Dose/Taking    acetaminophen (TYLENOL) 500 MG tablet Take 500-1,000 mg by mouth every 6 hours as needed for mild pain or fever.  7/29/2025    albuterol (PROAIR HFA) 108 (90 Base) MCG/ACT inhaler Inhale 2 puffs into the lungs every 6 hours as needed for shortness of breath / dyspnea or wheezing  Past Month    aspirin 81 MG EC tablet Take 1 tablet (81 mg) by mouth daily  7/30/2025 Morning    budesonide (PULMICORT FLEXHALER) 90 MCG/ACT inhaler Inhale 2 puffs into the lungs 2 times daily as needed (shortness of breath)  Past Month    Calcium Citrate-Vitamin D (CALCIUM CITRATE + D PO) Take 1 tablet by mouth daily.  7/30/2025 Morning    Dextromethorphan-guaiFENesin (DIABETIC TUSSIN MAX ST)  MG/5ML LIQD Take 10-30 mLs by mouth every 6 hours as needed (cough).  7/30/2025 Morning    empagliflozin (JARDIANCE) 10 MG TABS tablet Take 1 tablet (10 mg) by mouth daily.  7/30/2025 Morning    FLUoxetine (PROZAC) 20 MG capsule Take 1 capsule (20 mg) by mouth daily. With 40mg for a total daily dose of 60mg  7/30/2025 Morning    FLUoxetine (PROZAC) 40 MG capsule Take 1 capsule (40 mg) by mouth daily.  7/30/2025 Morning    fluticasone-salmeterol (ADVAIR) 250-50 MCG/ACT inhaler Inhale 1 puff into the lungs 2 times  daily  7/30/2025 Morning    furosemide (LASIX) 20 MG tablet Take 1 tablet (20 mg) by mouth daily as needed (fluid retention)  Unknown    hydrOXYzine HCl (ATARAX) 10 MG tablet Take 1 tablet (10 mg) by mouth daily as needed for anxiety  Unknown    hydrOXYzine HCl (ATARAX) 25 MG tablet Take 1 tablet (25 mg) by mouth nightly as needed for anxiety (sleep)  Past Week    isosorbide mononitrate (IMDUR) 60 MG 24 hr tablet Take 1 tablet (60 mg) by mouth daily.  7/30/2025 Morning    latanoprost (XALATAN) 0.005 % ophthalmic solution Place 1 drop into both eyes At Bedtime  7/29/2025 Bedtime    melatonin 5 MG tablet Take 1 tablet (5 mg) by mouth nightly as needed for sleep.  7/28/2025 Bedtime    Multiple Vitamins-Iron (ONE DAILY MULTIVITAMIN/IRON) TABS Take 1 tablet by mouth daily  7/30/2025    mycophenolate (GENERIC EQUIVALENT) 250 MG capsule Take 3 capsules (750 mg) by mouth.  7/29/2025 Bedtime    naltrexone (DEPADE/REVIA) 50 MG tablet Take 1 tablet (50 mg) by mouth daily.  7/30/2025 Morning    nitroGLYcerin (NITROSTAT) 0.4 MG sublingual tablet Place 1 tablet (0.4 mg) under the tongue every 5 minutes as needed for chest pain  Unknown    Omega-3 Fatty Acids (OMEGA 3 PO) Take 1 capsule by mouth daily Dose unknown  7/30/2025 Morning    pantoprazole (PROTONIX) 40 MG EC tablet TAKE 1 TABLET BY MOUTH EVERY DAY  7/30/2025 Morning    predniSONE (DELTASONE) 5 MG tablet Take 1 tablet (5 mg) by mouth daily. 7/31/2025: Per pt currently taking every day 7/30/2025 Morning    rosuvastatin (CRESTOR) 20 MG tablet Take 1 tablet (20 mg) by mouth daily.  7/30/2025 Morning    sodium chloride (OCEAN) 0.65 % nasal spray Spray 1 spray into both nostrils every hour as needed for congestion.  Unknown    white petrolatum gel Apply topically daily as needed for dry skin (Apply to nose while using the nasal cannula to prevent nose bleeds).  Unknown

## 2025-07-31 NOTE — CONSULTS
Gastroenterology Consultation  GI Luminal Service    Date of Admission:  7/30/2025  Reason for Consult:            C/f UGIB  Date of Consult  7/31/2025   Requesting Physician:  Ron Cheung MD           ASSESSMENT AND RECOMMENDATIONS:   Assessment:  Jerad Ross is a 65 year old male with a past medical history of NSTEMI (2014), FSGS s/p DDKT (1978, 1993), HTN, chronic anemia, PE, chronic HBV, and COPD who was recently admitted to [facility] 7/9 - 7/21/25 with acute hypoxic respiratory failure 2/2 rhinovirus and newly diagnosed HFpEF with acute decompensation. He was transferred to  TCU 7/21/25 for physical rehabilitation. He now returns to [facility] for evaluation of acute on chronic anemia.     # Acute on Chronic Anemia  # C/f UGIB  # CAD s/p CABG  Patient with progressive fatigue and dizziness with position changes as well as darker stools for several days. Notably had Hgb drop of 4 points in 6 days, additionally on admission had BUN/cr ratio of 32 with possible scant black flecks on rectal exam. Retics appropriately elevated and haptoglobin and LDH not suggestive of hemolysis. Findings concerning for upper GI bleed, possible ulcer in the setting of NSAIDs and ASA use. Will plan for EGD today for diagnostic and possible therapeutic purposes.     Recommendations:  - Plan for EGD today  - Switch from PO to IV PPI BID  - Ensure patient has two large bore IVs  - Trend Hgb, transfuse if < 8  - Hold anticoagulation  - Continue NPO status   - Otherwise defer to primary team for continued excellent care      Thank you for involving us in this patient's care. Please do not hesitate to contact the GI service with any questions or concerns.     The patient was discussed and plan agreed upon with Dr. Washington, GI Luminal Service staff physician.    Overall time spent on the date of this encounter preparing to see the patient (including chart review of available notes, clinical status events, imaging and labs); obtaining  and/or reviewing separately obtained history from patient; discussing, ordering, coordinating care, recommended medications, tests or procedures; communicating with other health care professionals; and documenting the above clinical information in the electronic medical record was 60 minutes.    SHABBIR TARANGO MD  Inpatient Gastroenterology Service  [facility]  [facility]          History of Present Illness:   Patient seen and examined at bedside. History is obtained from patient and EMR.    Jerad Ross is a 65 year old male with a past medical history of NSTEMI (2014), FSGS s/p DDKT (1978, 1993), HTN, chronic anemia, PE, chronic HBV, and COPD who was recently admitted to [facility] 7/9 - 7/21/25 with acute hypoxic respiratory failure 2/2 rhinovirus and newly diagnosed HFpEF with acute decompensation. He was transferred to  TCU 7/21/25 for physical rehabilitation. He now returns to [facility] for evaluation of acute on chronic anemia.     Patient states that he was feeling improved at time of discharge to TCU. However he did note darker stools over the last several days as well as increasing fatigue. He states that he continues to have 1 BM daily, denies abdominal pain, nausea, vomiting. Reports his stools are not tarry, perhaps somewhat softer. He also notes worsening dizziness/lightheadedness with position changes. He denies shortness of breath as rest but notes this will movement.    He reports taking his medications as prescribed including aspirin. He also will occasionally take advil, 2-3 tablets in the AM for aches and pain. He denies any recent smoking, EtOH use or drug use.    Previous GI Endoscopic Procedures:  Colonoscopy 6/5/2025:  Impression:            - Hemorrhoids found on perianal exam.                          - Diverticulosis in the sigmoid colon.                          - No specimens collected.             Past Medical History:   Reviewed and edited as appropriate  Past  Medical History:   Diagnosis Date    Acute midline low back pain without sciatica     AION (acute ischemic optic neuropathy)     Anemia in chronic renal disease     Anxiety     Arthritis 1978    Post transplant    Avascular necrosis of bones of both hips (H)     s/p bilateral hip replacements    Avascular necrosis of bones of both hips (H)     s/p bilateral hip replacements    Basal cell carcinoma     Cataract     Chronic hepatitis B (H)     Chronic osteoarthritis 1978    Post transplant    Clostridium difficile colitis     COPD (chronic obstructive pulmonary disease) (H)     Coronary artery disease involving native coronary artery of native heart without angina pectoris 06/17/2014    Coronary angiogram 6/17/14: Severe distal 3-vessel disease involving small vessels, not amenable to PCI or CABG.    CRP elevated     Depression     Depressive disorder 1978    Post transplant    Dialysis patient     Diverticulosis     Dyslipidemia     Elevated C-reactive protein (CRP)     FSGS (focal segmental glomerulosclerosis)     FSGS (focal segmental glomerulosclerosis)     Gastric ulcer with hemorrhage 02/12/2012    Gastric ulcer with hemorrhage 02/12/2012    GERD (gastroesophageal reflux disease)     Glaucoma     OHTN    Glaucoma     Hemorrhoids     History of blood transfusion     HTN (hypertension)     Hyperlipidemia 1978    Hypertension secondary to other renal disorders     Hypogonadism in male     Immunosuppressed status     Immunosuppression     Kidney replaced by transplant     focal glomerulosclerosis     Kidney transplanted     focal glomerulosclerosis     NSTEMI (non-ST elevated myocardial infarction) (H) 06/17/2014    NSTEMI (non-ST elevated myocardial infarction) (H) 06/17/2014    JULIANE (obstructive sleep apnea)     Doesn't use CPAP    Paracentral scotoma     LE    Renal failure     Secondary renal hyperparathyroidism     Septic bursitis     Squamous cell carcinoma     Uncomplicated asthma 2003    Well controled             Past Surgical History:   Reviewed and edited as appropriate   Past Surgical History:   Procedure Laterality Date    APPENDECTOMY      ARTHROPLASTY REVISION HIP Right 06/19/2023    Procedure: RIGHT HIP REVISION;  Surgeon: Juan Mcdonough MD;  Location:  OR    BIOPSY      Cardiac Bypass surgery  06/08/2020    [facility]     CATARACT EXTRACTION EXTRACAPSULAR W/ INTRAOCULAR LENS IMPLANTATION Bilateral 4-20-10, 6-1-10    CATARACT IOL, RT/LT  04/19/2000    RE    CATARACT IOL, RT/LT  06/01/2000    LE    COLECTOMY PARTIAL  01/01/1983     10 cm, diverticulitis     COLECTOMY SUBTOTAL  1983    10 cm, diverticulitis    COLONOSCOPY  02/13/2012    Procedure:COLONOSCOPY; Surgeon:SLOAN GALLARDO; Location: GI    COLONOSCOPY N/A 01/22/2020    Procedure: Colonoscopy, With Polypectomy And Biopsy;  Surgeon: Aston Kiran MD;  Location:  GI    COLONOSCOPY N/A 06/05/2025    Procedure: Colonoscopy;  Surgeon: Lina Claros MD;  Location:  GI    CV CORONARY ANGIOGRAM N/A 06/02/2020    Procedure: Coronary Angiogram;  Surgeon: Alona Florentino MD;  Location: [facility] Cath Lab;  Service: Cardiology    CV CORONARY ANGIOGRAM N/A 09/20/2021    Procedure: Coronary Angiogram;  Surgeon: Adam Agudelo MD;  Location: [facility]    CV LEFT HEART CATHETERIZATION WITHOUT LEFT VENTRICULOGRAM Left 06/02/2020    Procedure: Left Heart Catheterization Without Left Ventriculogram;  Surgeon: Alona Florentino MD;  Location: [facility] Cath Lab;  Service: Cardiology    CV SUPRAVALVULAR AORTOGRAM N/A 09/20/2021    Procedure: Supravalvular Aortagram;  Surgeon: Adam Agudelo MD;  Location: [facility] CV    ESOPHAGOSCOPY, GASTROSCOPY, DUODENOSCOPY (EGD), COMBINED  02/13/2012    Procedure:COMBINED ESOPHAGOSCOPY, GASTROSCOPY, DUODENOSCOPY (EGD); Surgeon:SLOAN GALLARDO; Location: GI    ESOPHAGOSCOPY, GASTROSCOPY, DUODENOSCOPY (EGD), COMBINED  02/13/2012    EXTRACAPSULAR  CATARACT EXTRATION WITH INTRAOCULAR LENS IMPLANT  4-20-10, 6-1-10    Rt, Lt    HERNIA REPAIR  1995    Lt inguinal    HERNIA REPAIR  1995    Lt inguinal    HIP SURGERY      1981, bilat MITZI, revised 2001, 2005    HIP SURGERY  01/01/1981    bilat MITZI, revised 2001, 2005    IR FINE NEEDLE ASPIRATION W ULTRASOUND  04/10/2023    JOINT REPLACEMENT      KIDNEY SURGERY  1978 and 1993    transplant    KNEE SURGERY  1983, 1987, 2001    bilat TKA    MOHS MICROGRAPHIC PROCEDURE      MOHS MICROGRAPHIC PROCEDURE      ORTHOPEDIC SURGERY      OTHER SURGICAL HISTORY      kidney [id number], 1993    PHACOEMULSIFICATION CLEAR CORNEA WITH STANDARD INTRAOCULAR LENS IMPLANT Right 04/19/2000    PHACOEMULSIFICATION CLEAR CORNEA WITH STANDARD INTRAOCULAR LENS IMPLANT Left 06/01/2000    AR BOWEL TO BOWEL ANASTOMOSIS      AR CARDIAC SURG PROCEDURE UNLISTED  2020    3x bypass    AR PELVIS/HIP JOINT SURGERY UNLISTED  1981, 1996, 2005, 2023    Both hips, L 1x rev. R 2x rev.    AR STOMACH SURGERY PROCEDURE UNLISTED  1978    Splenectomy    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, auxiliary spleen    TONSILLECTOMY      TRANSPLANT      VASCULAR SURGERY  1976              Social History:     See H&P           Family History:   Patient's family history is reviewed today and is non-contributory    Family History   Problem Relation Age of Onset    Asthma Mother     Arthritis Mother     Cardiovascular Father         AI with valve repair    Hypertension Father     Kidney Disease Father     Other - See Comments Father         AI with valve repair    Hyperlipidemia Father     Heart Disease Father     Anxiety Disorder Maternal Grandmother     Depression Maternal Grandmother     Breast Cancer Maternal Grandmother     Substance Abuse Maternal Grandfather     Cerebrovascular Disease Maternal Grandfather     Other - See Comments Maternal Grandfather         cerebrovascular disease    Depression Maternal Grandfather     Dementia Maternal Grandfather     Heart Disease  Maternal Grandfather     Cancer Paternal Grandmother         ovarian ca    Ovarian Cancer Paternal Grandmother     Depression Paternal Grandmother     Uterine Cancer Paternal Grandmother     Cerebrovascular Disease Paternal Grandfather     Dementia Paternal Grandfather     Heart Disease Paternal Grandfather     Kidney Disease Paternal Aunt     Kidney Disease Paternal Aunt     Skin Cancer No family hx of     Glaucoma No family hx of     Macular Degeneration No family hx of     Amblyopia No family hx of     Melanoma No family hx of     Diabetes No family hx of              Allergies:   Reviewed and edited as appropriate     Allergies   Allergen Reactions    Penicillins Shortness Of Breath and Hives     Occurred in childhood     Keflex [Cephalexin Hcl] Unknown     Patient could not recall reaction to Keflex  Per chart, has tolerated cefazolin (2023)    Sulfa Antibiotics Rash    Tetracycline Unknown     Patient could not recall reaction, childhood reaction            Medications:     Current Facility-Administered Medications   Medication Dose Route Frequency Provider Last Rate Last Admin    acetaminophen (TYLENOL) tablet 650 mg  650 mg Oral Q4H PRN Abby Goddard PA-C        albuterol (PROVENTIL HFA/VENTOLIN HFA) inhaler  2 puff Inhalation Q6H PRN Abby Goddard PA-C        alum & mag hydroxide-simethicone (MAALOX) suspension 15 mL  15 mL Oral TID PRN Abby Goddard PA-C        aspirin EC tablet 81 mg  81 mg Oral Daily Abby Goddard PA-C   81 mg at 07/31/25 0800    benzonatate (TESSALON) capsule 100 mg  100 mg Oral TID PRN Abby Goddard PA-C   100 mg at 07/30/25 2218    calcium carbonate (TUMS) chewable tablet 1,000 mg  1,000 mg Oral 4x Daily PRN Abby Goddard PA-C        empagliflozin (JARDIANCE) tablet 10 mg  10 mg Oral Daily Abby Goddard PA-C   10 mg at 07/31/25 0800    entecavir (BARACLUDE) tablet 0.5 mg  0.5 mg Oral Daily Abby Goddard PA-C   0.5 mg at 07/31/25 0800    FLUoxetine (PROzac) capsule 60  mg  60 mg Oral Daily Abby Goddard PA-C   60 mg at 07/31/25 0801    fluticasone-vilanterol (BREO ELLIPTA) 100-25 MCG/ACT inhaler 1 puff  1 puff Inhalation Daily Abby Goddard PA-C   1 puff at 07/31/25 0801    hydrOXYzine HCl (ATARAX) tablet 25 mg  25 mg Oral At Bedtime PRN Abby Goddard PA-C        [Held by provider] isosorbide mononitrate (IMDUR) 24 hr tablet 60 mg  60 mg Oral Daily Abby Goddard PA-C        latanoprost (XALATAN) 0.005 % ophthalmic solution 1 drop  1 drop Both Eyes At Bedtime Abby Goddard PA-C   1 drop at 07/30/25 2139    lidocaine (LMX4) cream   Topical Q1H PRN Abby Goddard PA-C        lidocaine 1 % 0.1-1 mL  0.1-1 mL Other Q1H PRN Abby Goddard PA-C        [Held by provider] metoprolol succinate ER (TOPROL-XL) 24 hr half-tab 12.5 mg  12.5 mg Oral Daily Abby Goddard PA-C        multivitamin w/minerals (THERA-VIT-M) tablet 1 tablet  1 tablet Oral Daily Abby Goddard PA-C   1 tablet at 07/31/25 0800    mycophenolate (GENERIC EQUIVALENT) capsule 750 mg  750 mg Oral BID IS Abby Goddard PA-C   750 mg at 07/31/25 0800    naltrexone (DEPADE/REVIA) tablet 50 mg  50 mg Oral Daily Abby Goddard PA-C   50 mg at 07/31/25 0801    pantoprazole (PROTONIX) EC tablet 40 mg  40 mg Oral BID AC Abby Goddard PA-C   40 mg at 07/31/25 0800    polyethylene glycol (MIRALAX) Packet 17 g  17 g Oral Daily PRN Abby Goddard PA-C        predniSONE (DELTASONE) tablet 5 mg  5 mg Oral Daily Abby Goddard PA-C   5 mg at 07/31/25 0800    rosuvastatin (CRESTOR) tablet 20 mg  20 mg Oral Daily Abby Goddard PA-C   20 mg at 07/31/25 0800    senna-docusate (SENOKOT-S/PERICOLACE) 8.6-50 MG per tablet 1 tablet  1 tablet Oral BID Abby Sheehan PA-C        Or    senna-docusate (SENOKOT-S/PERICOLACE) 8.6-50 MG per tablet 2 tablet  2 tablet Oral BID PRAbby Bermudez PA-C        sodium chloride (PF) 0.9% PF flush 3 mL  3 mL Intracatheter Q8H NE Abby Goddard PA-C        sodium chloride (PF) 0.9%  PF flush 3 mL  3 mL Intracatheter q1 min prn Abby Goddard PA-C        white petrolatum GEL   Topical Daily PRN Abby Goddard PA-C         Current Outpatient Medications   Medication Sig Dispense Refill    acetaminophen (TYLENOL) 500 MG tablet Take 500-1,000 mg by mouth every 6 hours as needed for mild pain or fever.      albuterol (PROAIR HFA) 108 (90 Base) MCG/ACT inhaler Inhale 2 puffs into the lungs every 6 hours as needed for shortness of breath / dyspnea or wheezing 1 g 11    aspirin 81 MG EC tablet Take 1 tablet (81 mg) by mouth daily      budesonide (PULMICORT FLEXHALER) 90 MCG/ACT inhaler Inhale 2 puffs into the lungs 2 times daily as needed (shortness of breath)      Calcium Citrate-Vitamin D (CALCIUM CITRATE + D PO) Take 1 tablet by mouth daily.      Dextromethorphan-guaiFENesin (DIABETIC TUSSIN MAX ST)  MG/5ML LIQD Take 10-30 mLs by mouth every 6 hours as needed (cough).      empagliflozin (JARDIANCE) 10 MG TABS tablet Take 1 tablet (10 mg) by mouth daily. 90 tablet 1    FLUoxetine (PROZAC) 20 MG capsule Take 1 capsule (20 mg) by mouth daily. With 40mg for a total daily dose of 60mg 90 capsule 3    FLUoxetine (PROZAC) 40 MG capsule Take 1 capsule (40 mg) by mouth daily. 90 capsule 3    fluticasone-salmeterol (ADVAIR) 250-50 MCG/ACT inhaler Inhale 1 puff into the lungs 2 times daily 180 each 3    furosemide (LASIX) 20 MG tablet Take 1 tablet (20 mg) by mouth daily as needed (fluid retention) 90 tablet 1    hydrOXYzine HCl (ATARAX) 10 MG tablet Take 1 tablet (10 mg) by mouth daily as needed for anxiety 30 tablet 1    hydrOXYzine HCl (ATARAX) 25 MG tablet Take 1 tablet (25 mg) by mouth nightly as needed for anxiety (sleep) 90 tablet 1    isosorbide mononitrate (IMDUR) 60 MG 24 hr tablet Take 1 tablet (60 mg) by mouth daily. 90 tablet 2    latanoprost (XALATAN) 0.005 % ophthalmic solution Place 1 drop into both eyes At Bedtime 2.5 mL 11    melatonin 5 MG tablet Take 1 tablet (5 mg) by mouth nightly  as needed for sleep.      Multiple Vitamins-Iron (ONE DAILY MULTIVITAMIN/IRON) TABS Take 1 tablet by mouth daily      mycophenolate (GENERIC EQUIVALENT) 250 MG capsule Take 3 capsules (750 mg) by mouth.      naltrexone (DEPADE/REVIA) 50 MG tablet Take 1 tablet (50 mg) by mouth daily. 30 tablet 2    nitroGLYcerin (NITROSTAT) 0.4 MG sublingual tablet Place 1 tablet (0.4 mg) under the tongue every 5 minutes as needed for chest pain 20 tablet 3    Omega-3 Fatty Acids (OMEGA 3 PO) Take 1 capsule by mouth daily Dose unknown      pantoprazole (PROTONIX) 40 MG EC tablet TAKE 1 TABLET BY MOUTH EVERY DAY 90 tablet 3    predniSONE (DELTASONE) 5 MG tablet Take 1 tablet (5 mg) by mouth daily. 90 tablet 3    rosuvastatin (CRESTOR) 20 MG tablet Take 1 tablet (20 mg) by mouth daily. 90 tablet 3    sodium chloride (OCEAN) 0.65 % nasal spray Spray 1 spray into both nostrils every hour as needed for congestion.      white petrolatum gel Apply topically daily as needed for dry skin (Apply to nose while using the nasal cannula to prevent nose bleeds). 113 g 3    acetaminophen (TYLENOL) 650 MG suppository Place 1 suppository (650 mg) rectally every 4 hours as needed for mild pain or fever (greater than 101 degrees.).      clindamycin (CLEOCIN) 150 MG capsule TAKE 2 CAPSULES BY MOUTH 1 HOUR PRIOR TO DENTAL APPOINTMENT. TAKE 1 CAPSULE BY MOUTH EVERY 6 HOURS AFTER APPOINTMENT      entecavir (BARACLUDE) 0.5 MG tablet Take 1 tablet (0.5 mg) by mouth daily. 90 tablet 3    metoprolol succinate ER (TOPROL XL) 25 MG 24 hr tablet Take 0.5 tablets (12.5 mg) by mouth daily. 45 tablet 2    polyethylene glycol (MIRALAX) 17 g packet Take 17 g by mouth daily as needed                Review of Systems:     A complete review of systems was performed and is negative except as noted in the HPI           Physical Exam:   Temp: 98  F (36.7  C) Temp src: Oral BP: 105/64 Pulse: 76   Resp: 18 SpO2: 100 % O2 Device: Nasal cannula Oxygen Delivery: 2 LPM  Wt:   Wt  Readings from Last 2 Encounters:   07/30/25 71 kg (156 lb 9.6 oz)   07/21/25 69.3 kg (152 lb 12.8 oz)        General:  male lying in bed in NAD. Appears comfortable.  Answers appropriately.    HEENT: Head is AT/NC. Sclera anicteric.   Lungs: Clear to auscultation bilaterally.  No wheezes, rhonchi or crackles appreciated. On 2L via NC satting at 100%    Heart: Regular rate and rhythm.  No murmurs, gallops or rubs appreciated.  Normal S1 and S2. Peripheral perfusion intact.  Abdomen: Soft,mild LLQ tenderness, non-distended.  BS +. No peritoneal signs.  Rectal: External exam with no fissures, lesions or masses visualized. NAJMA with flakes of brown and black stool.  Extremities: WWP, no pedal edema.   Musculoskeletal: No gross deformity.  Skin: No jaundice or rash on exposed skin.  Neurologic: Grossly non-focal.  CN 2-12 grossly intact.   Mental status/Psych: A&O. Asks/answers questions appropriately.  Pleasant, interactive.             Data:   LAB WORK:    BMP  Recent Labs   Lab 07/31/25  0536 07/30/25  1633 07/30/25  1224 07/30/25  0620 07/28/25  0611    135 136  --  137   POTASSIUM 3.7 4.5 4.2  --  3.8   CHLORIDE 101 99 100  --  102   HEMA 8.8 8.6* 8.2*  --  8.9   CO2 25 28 25  --  24   BUN 15.8 20.9 23.4*  --  32.8*   CR 0.74 0.71 0.70 0.70 0.61*   GLC 73 107* 137*  --  100*     CBC  Recent Labs   Lab 07/31/25  0536 07/30/25  1633 07/30/25  1400 07/30/25  1224   WBC 7.3 7.7 8.0 8.9   RBC 2.54* 2.70* 2.72* 2.81*   HGB 7.4* 8.0* 7.9* 8.2*   HCT 24.2* 25.8* 25.6* 26.0*   MCV 95 96 94 93   MCH 29.1 29.6 29.0 29.2   MCHC 30.6* 31.0* 30.9* 31.5   RDW 16.8* 16.6* 16.3* 16.5*    289 272 287     INR  Recent Labs   Lab 07/31/25  0536 07/30/25  1633 07/30/25  1404   INR 1.12 1.09 1.10     LFTs  Recent Labs   Lab 07/31/25  0536 07/30/25  1633 07/30/25  1224   ALKPHOS 45 48 50   AST 23 26 21   ALT 12 11 14   BILITOTAL 0.5 0.5 0.5   PROTTOTAL 5.2* 5.3* 5.1*   ALBUMIN 2.9* 3.0* 2.8*      PANCNo lab results found in last  7 days.    IMAGING:  CT PE and AP w/ contrast 7/30  IMPRESSION:  1. No acute pulmonary embolism. Improved multifocal groundglass  opacities throughout the lungs.   2. No acute pathology in the abdomen or pelvis. Incidentals as  described above.        =======================================================================

## 2025-07-31 NOTE — OR NURSING
Procedure: egd with bxs for h pylori  Sedation: conscious sedation   Specimens: verified, sent to lab.   O2: 2L during proc, back to baseline of 1L post proc  Tolerated procedure: well  Report called to ED RN at 1320  Other:  paper consent utilized    All belongings with patient at time of transfer.

## 2025-08-01 ENCOUNTER — APPOINTMENT (OUTPATIENT)
Dept: PHYSICAL THERAPY | Facility: CLINIC | Age: 65
DRG: 377 | End: 2025-08-01
Attending: PHYSICIAN ASSISTANT
Payer: COMMERCIAL

## 2025-08-01 LAB
ATRIAL RATE - MUSE: 74 BPM
DIASTOLIC BLOOD PRESSURE - MUSE: NORMAL MMHG
INTERPRETATION ECG - MUSE: NORMAL
P AXIS - MUSE: 4 DEGREES
PR INTERVAL - MUSE: 156 MS
QRS DURATION - MUSE: 82 MS
QT - MUSE: 414 MS
QTC - MUSE: 459 MS
R AXIS - MUSE: -20 DEGREES
SYSTOLIC BLOOD PRESSURE - MUSE: NORMAL MMHG
T AXIS - MUSE: 121 DEGREES
VENTRICULAR RATE- MUSE: 74 BPM

## 2025-08-01 ASSESSMENT — ACTIVITIES OF DAILY LIVING (ADL)
ADLS_ACUITY_SCORE: 58
ADLS_ACUITY_SCORE: 38
ADLS_ACUITY_SCORE: 58
ADLS_ACUITY_SCORE: 58
ADLS_ACUITY_SCORE: 57
ADLS_ACUITY_SCORE: 57
ADLS_ACUITY_SCORE: 58
ADLS_ACUITY_SCORE: 38
ADLS_ACUITY_SCORE: 58
ADLS_ACUITY_SCORE: 38
ADLS_ACUITY_SCORE: 38
DEPENDENT_IADLS:: INDEPENDENT
ADLS_ACUITY_SCORE: 58
ADLS_ACUITY_SCORE: 38
ADLS_ACUITY_SCORE: 38
ADLS_ACUITY_SCORE: 58
ADLS_ACUITY_SCORE: 57
ADLS_ACUITY_SCORE: 38
ADLS_ACUITY_SCORE: 58
ADLS_ACUITY_SCORE: 58

## 2025-08-01 NOTE — PROGRESS NOTES
"   08/01/25 1000   Appointment Info   Signing Clinician's Name / Credentials (PT) Genevieve Kimbrough, PT, DPT   Living Environment   People in Home other (see comments)  (owns house, has tenant that rents a room)   Current Living Arrangements house   Home Accessibility stairs to enter home;stairs within home   Number of Stairs, Main Entrance 3   Stair Railings, Main Entrance railing on left side (ascending);railing on right side (ascending)  (B railings but too far apart to hold both)   Number of Stairs, Within Home, Primary greater than 10 stairs  (12 to upper level bedroom and only bathroom)   Stair Railings, Within Home, Primary railing on left side (ascending)   Transportation Anticipated family or friend will provide;car, drives self  (lyft or friends, drives but doesn't currently have a car)   Living Environment Comments Pt lives in a house with 3 DAYDAY and B railings, ~ 12 steps to bedroom/bathroom. Bathroom equipped with STD height toilet, tub shower with tub extender bench and grab bars. Pt has a roommate but they do not \"help eachother out.\"   Self-Care   Usual Activity Tolerance moderate   Current Activity Tolerance fair   Regular Exercise Yes   Activity/Exercise Type walking;other (see comments)  (leg exercises in bed)   Exercise Amount/Frequency daily   Equipment Currently Used at Home grab bar, tub/shower;tub bench;walker, standard   Fall history within last six months no   Activity/Exercise/Self-Care Comment PTA pt was IND with mobility without an AD as well as I/ADLs. Most recently pt has been at TCU, SB-CGA with FWW ambulating 100-200ft on RA. Pt not on oxygen at baseline.   General Information   Onset of Illness/Injury or Date of Surgery 07/30/25   Referring Physician Abby Goddard PA-C   Patient/Family Therapy Goals Statement (PT) build up endurance   Pertinent History of Current Problem (include personal factors and/or comorbidities that impact the POC) Per EMR: \"Jerad Ross is a 65 year old male " "with history of FSGS s/p DDKT (1978, 1993), HTN, CAD s/p CABG, NSTEMI (2014), PE, ?COPD vs restrictive lung disease, chronic HBV, GERD, MDD, anxiety, AUD (in remission), chronic anemia, and recent admission 7/9/25 for AHRF due to rhinovirus and newly diagnosed HFpEF, discharged to [facility] on 7/21/25 who presents with acute anemia of unclear etiology. Admitted to medicine for further evaluation and management.\"   Existing Precautions/Restrictions oxygen therapy device and L/min;fall  (2L O2 via NC)   General Observations Activity: up with assist   Cognition   Affect/Mental Status (Cognition) WFL   Orientation Status (Cognition) oriented x 4   Follows Commands (Cognition) WNL   Pain Assessment   Patient Currently in Pain No   Integumentary/Edema   Integumentary/Edema no deficits were identifed   Posture    Posture Forward head position;Protracted shoulders   Range of Motion (ROM)   Range of Motion ROM is WFL   ROM Comment BLE ROM grossly WFL per functional mobility   Strength (Manual Muscle Testing)   Strength (Manual Muscle Testing) Deficits observed during functional mobility   Strength Comments BLE strength grossly at least > 3+/5 per functional mobility, generalized weakness and deconditioning   Bed Mobility   Comment, (Bed Mobility) SBA supine > sit   Transfers   Comment, (Transfers) SBA STS from EOB, elevated EOB height, FWW   Gait/Stairs (Locomotion)   Comment, (Gait/Stairs) Pt amb x 5ft with FWW and CGA, stairs deferred d/t fatigue   Balance   Balance other (describe)   Balance Comments sitting balance grossly WFL, pt able to stand statically without UE support, requires BUE support at FWW for dynamic standing and gait   Sensory Examination   Sensory Perception patient reports no sensory changes   Clinical Impression   Criteria for Skilled Therapeutic Intervention Yes, treatment indicated   PT Diagnosis (PT) impaired functional mobility   Influenced by the following impairments strength, balance, " endurance, O2 needs   Functional limitations due to impairments decreased IND with bed mobility, transfers, gait, stairs   Clinical Presentation (PT Evaluation Complexity) stable   Clinical Presentation Rationale clinical judgement   Clinical Decision Making (Complexity) low complexity   Planned Therapy Interventions (PT) balance training;bed mobility training;gait training;ROM (range of motion);stair training;strengthening;stretching;transfer training;home exercise program;neuromuscular re-education;patient/family education;progressive activity/exercise;home program guidelines   Risk & Benefits of therapy have been explained evaluation/treatment results reviewed;care plan/treatment goals reviewed;risks/benefits reviewed;current/potential barriers reviewed;participants voiced agreement with care plan;participants included;patient   PT Total Evaluation Time   PT Eval, Low Complexity Minutes (24226) 5   Physical Therapy Goals   PT Frequency 5x/week   PT Predicted Duration/Target Date for Goal Attainment 08/15/25   PT Goals Bed Mobility;Transfers;Gait;Stairs;Aerobic Activity   PT: Bed Mobility Modified independent;Supine to/from sit   PT: Transfers Modified independent;Sit to/from stand;Bed to/from chair;Assistive device   PT: Gait Modified independent;Assistive device;Greater than 200 feet;Standard walker   PT: Stairs Greater than 10 stairs;Rail on left;Modified independent  (12)   PT: Perform aerobic activity with stable cardiovascular response continuous activity;5 minutes;ambulation   PT Discharge Planning   PT Plan progress gait endurance, NuStep for endurnace, stairs, functional strengthening   PT Discharge Recommendation (DC Rec) Transitional Care Facility   PT Rationale for DC Rec Pt presents below baseline, limited by inc'd O2 needs and generalized weakness/deconditioning limiting overall activity tolerance and IND with mobility. Pt with minimal support at home. At this time recommend return to TCU to progress  mobility towards PLOF for safe discharge home. Will continue to follow and update as appropriate.   PT Brief overview of current status SB-CGA with FWW; encourage OOB to chair and amb 3-4x/day   PT Total Distance Amb During Session (feet) 80   Physical Therapy Time and Intention   Total Session Time (sum of timed and untimed services) 5

## 2025-08-01 NOTE — PROGRESS NOTES
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 He was transferred to  TCU 7/21/25 for physical rehabilitation. He now returns to [facility] for evaluation of acute on chronic anemia.      # Acute on Chronic Anemia  Patient with progressive fatigue and dizziness with position changes as well as darker stools for several days. Notably had Hgb drop of 4 points in 6 days, additionally on admission had BUN/cr ratio of 32 with possible scant black flecks on rectal exam. Retics appropriately elevated and haptoglobin and LDH not suggestive of hemolysis. No evidence of upper GI bleed on EGD 7/31, biopsies taken. Stable hemoglobin as of 8/1. Will plan for capsule study for further evaluation, can be performed outpatient.    Recommendations:   - Continue PPI qday  - Recommend a capsule study (patency capsule first) to look for a source of bleeding in the small bowel. This can be done on an outpatient basis. (Outpatient order placed)    GI will sign off    Thank you for involving us in this patient's care. Please do not hesitate to contact the GI service with any questions or concerns.     Pt care plan discussed with Dr. Boyle, GI staff physician.    Roberto Rose MD  PGY-4, Gastroenterology Fellow    Attending Attestation:  I saw the patient with Dr. Rose, and agree with the findings and the plan of care as documented in the fellow's note. 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 He was transferred to  TCU 7/21/25 for physical rehabilitation. He now returns to [facility] for evaluation of acute on chronic anemia.     The patient is currently well with a stable hemoglobin 7.4-7.6-7.4. Recommend discontinuing BID Hgb checks. The upper endoscopy was unrevealing and the patient had a recent colonoscopy. Reasonable to pursue capsule endoscopy as an outpatient should be he stable for discharge. Please call with questions.      45 minutes were spent on the date of the encounter performing chart review, reviewing of outside and inside available records, review of test results as well as interpretation of tests, the patient visit, documentation, and discussion with other provider(s) and/or discussion with family.     Wood Boyle DO  Associate Professor of Medicine  Director, Esophageal Disorders Program  Director of Endoscopy  Division of Gastroenterology, Hepatology, and Nutrition  [facility]              Interval History:   NAEO. Patient denies any abdominal pain, no BMs, no nausea/vomiting. Reviewed plan in detail.    4 point ROS performed and negative unless noted above      Physical Exam   Blood pressure 114/67, pulse 71, temperature 98  F (36.7  C), temperature source Oral, resp. rate 16, height 1.727 m (5' 8\"), weight 72.6 kg (160 lb), SpO2 99%.  Constitutional: Well appearing, no acute distress, oriented to time, place, and self  Respiratory: Breathing comfortably, no digital clubbing  Cardiovascular: Non-diaphoretic, acyanotic, no peripheral edema  Abdomen: Non-distended, no obvious hernias or masses  Extremities: No peripheral edema, no lesions, atraumatic, no digital cyanosis  Musculoskeletal: Moves limbs appropriately, no obvious deformities     Imaging/procedures:  EGD 7/31:  Impression:              - No findings on today's exam to explain patient's                          anemia.         "                  - Z-line regular, 37 cm from the incisors.                          - Normal esophagus.                          - 2 cm hiatal hernia.                          - Gastritis. Biopsied.                          - Normal ampulla, duodenal bulb, first portion of the                          duodenum and second portion of the duodenum.

## 2025-08-01 NOTE — PROGRESS NOTES
Care Management Follow Up    Length of Stay (days): 2  Expected Discharge Date: 08/02/2025    Concerns to be Addressed: Health Care Directive    Additional Information:  08/01:  CHW delegated by TAMICA, notarized the Health Care Directive. Pt was alert and oriented. Pt confirmed their Health Care Agents and signed.  A copy was forwarded to [facility] and original copy left with Pt, and a copy filed in Pt's hard chart.     Priya Ireland   Community Health Worker, Certified  7A & 7B [facility], Acute Care Management.  [place], [place]   PH: [phone]

## 2025-08-01 NOTE — PLAN OF CARE
Goal Outcome Evaluation:      Plan of Care Reviewed With: patient    Overall Patient Progress: no changeOverall Patient Progress: no change    Vitals: VSS   Neuros: A & O x4. Able to make needs known.   IV: PIV is saline locked   Resp/trach: 2L of O2 via NC  Diet: CLD  Bowel status: No BM during shift. Passing gas without complications   : Void without complications   Pain: Rate pain as 4/10. PRN tylenol x1 was given to help managed pain.   Activity: 1 assist   Plan:     Admitted/transferred from:   2 RN full   skin assessment completed by Enid Hawkins RN and Heidi DAN RN.  Skin assessment finding: Dry skin, Generalized scattered scabs, Red dots/scabs on abdomen and chest area, Big scab on L forearm, Bruises/dots on R big toe, Old incisions on both knee bilaterally, old incision on back of L thigh, Red-blanchable sacrum, and small wound forming on sacrum.   Interventions/actions: Mepilex applied on sacrum. No other intervention completed.    Will continue to monitor.

## 2025-08-01 NOTE — CONSULTS
Care Management Initial Consult    General Information  Assessment completed with: Patient, VM-chart review,         Primary Care Provider verified and updated as needed:     Readmission within the last 30 days: current reason for admission unrelated to previous admission      Reason for Consult: discharge planning, other (see comments) (Elevated risk score)  Advance Care Planning: Advance Care Planning Reviewed: other (see comments) (Patient completed HCD - IP notary has been contacted to notarize document and have it uploaded to chart)          Communication Assessment  Patient's communication style: spoken language (English or Bilingual)             Cognitive  Cognitive/Neuro/Behavioral: WDL                      Living Environment:   People in home: other (see comments) (Roommate)     Current living Arrangements: house      Able to return to prior arrangements: other (see comments) (Recs are for TCU)       Family/Social Support:  Care provided by: self  Provides care for: no one  Marital Status:   Support system: Sibling(s), Friend          Description of Support System: Supportive    Support Assessment: Adequate social supports    Current Resources:   Patient receiving home care services: No        Community Resources: None  Equipment currently used at home: grab bar, tub/shower, tub bench, walker, standard  Supplies currently used at home: None    Employment/Financial:  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  No    Lifestyle & Psychosocial Needs:  Social Drivers of Health     Food Insecurity: Low Risk  (7/22/2025)    Food Insecurity     Within the past 12 months, did you worry that your food would run out before you got money to buy more?: No     Within the past 12 months, did the food you bought just not last and you didn t have money  to get more?: No   Depression: Not at risk (7/28/2025)    PHQ-2     PHQ-2 Score: 2   Housing Stability: Low Risk  (7/22/2025)    Housing Stability     Do you have housing? : Yes     Are you worried about losing your housing?: No   Tobacco Use: Medium Risk (7/30/2025)    Patient History     Smoking Tobacco Use: Former     Smokeless Tobacco Use: Former     Passive Exposure: Never   Financial Resource Strain: Low Risk  (7/22/2025)    Financial Resource Strain     Within the past 12 months, have you or your family members you live with been unable to get utilities (heat, electricity) when it was really needed?: No   Alcohol Use: Not on file   Transportation Needs: Low Risk  (7/22/2025)    Transportation Needs     Within the past 12 months, has lack of transportation kept you from medical appointments, getting your medicines, non-medical meetings or appointments, work, or from getting things that you need?: No   Physical Activity: Insufficiently Active (11/5/2024)    Exercise Vital Sign     Days of Exercise per Week: 5 days     Minutes of Exercise per Session: 20 min   Interpersonal Safety: Low Risk  (7/21/2025)    Interpersonal Safety     Do you feel physically and emotionally safe where you currently live?: Yes     Within the past 12 months, have you been hit, slapped, kicked or otherwise physically hurt by someone?: No     Within the past 12 months, have you been humiliated or emotionally abused in other ways by your partner or ex-partner?: No   Stress: Stress Concern Present (11/5/2024)    [employer] - Occupational Stress Questionnaire     Feeling of Stress : To some extent   Social Connections: Unknown (11/5/2024)    Social Connection and Isolation Panel [NHANES]     Frequency of Communication with Friends and Family: Not on file     Frequency of Social Gatherings with Friends and Family: More than three times a week     Attends Congregational Services: Not on file     Active Member of Clubs  "or Organizations: Not on file     Attends Club or Organization Meetings: Not on file     Marital Status: Not on file   Health Literacy: Not on file       Functional Status:  Prior to admission patient needed assistance:   Dependent ADLs:: Independent  Dependent IADLs:: Independent       Mental Health Status:  Mental Health Status: No Current Concerns       Chemical Dependency Status:  Chemical Dependency Status: No Current Concerns             Values/Beliefs:  Spiritual, Cultural Beliefs, Holiness Practices, Values that affect care: no               Discussed  Partnership in Safe Discharge Planning  document with patient/family: No    Additional Information:  \"Jerad Ross is a 65 year old male with a past medical history of NSTEMI (2014), FSGS s/p DDKT (1978, 1993), HTN, chronic anemia, PE, chronic HBV, and COPD who was recently admitted to [facility] 7/9 - 7/21/25 with acute hypoxic respiratory failure 2/2 rhinovirus and newly diagnosed HFpEF with acute decompensation. He was transferred to [facility] 7/21/25 for physical rehabilitation. He now returns to [facility] for evaluation of acute on chronic anemia.\"    Care management consult placed for elevated risk score (20%>).  Patient recently discharged from [facility] on 7/21/25 where he discharged to  TCU (7/21/25-7/30/25) before transferring back to [facility].    SW met with the patient at bedside to complete initial care management assessment. SW introduced self and explained their role in the patient's care. Patient was agreeable to speak with SW. Patient verified his home address, PCP, and insurance.    Patient reported he resides in a house with his roommate. Patient reported his house has a total of 7+ stairs. At baseline he is independent with ADLs and IADLs. Patient reported at baseline he does not use any DME or receive any in home services. He reported that he does have a walker at home he can use. Patient denies any financial concerns at this time. He receive social security " for his income. 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  Patient reported he was improving at TCU and reported a positive experience. TAMICA discussed PT's recs of TCU for continued therapy. TAMICA asked the patient his thoughts and if he would be agreeable to returning to [facility]. Patient expressed understanding and agreement with returning to [facility].  Patient completed HCD. SW went through HCD and confirmed is as completed and not yet signed. SW stated they will contact [facility] [[email or web address] to have document notarized and uploaded to his chart. Patient expressed understanding and denied having any questions or concerns at this time.    TAMICA messaged [employer] requesting patient's HCD be notarized and sent to [facility] [employer].    TAMICA messaged [facility] liaison asking if patient can return to [facility] when med ready.   Cyrus replied back stating he anticipates patient will be able to return. He reported [facility] Medicare now requires insurance authorization. He will submit for it today, however unclear if auth will comeback over the weekend vs Monday    Bebeto Jimenez PA-C with the primary medicine team reported the patient should be stable and med ready to return to [facility] tomorrow     [facility]  [address] 4th floor  [facility] [address]  P: [phone]  F: [phone]    Next Steps: TAMICA will continue to follow for discharge planning  [x]Confirm if patient can return to [facility] once med ready  []IMM  []IHO  []Arrange discharge transportation    TIARA HurstW, LSW  7C  ([id number])  [facility] Acute Care Management  Phone:  [phone]  Available on Vocera: 7C Med surg [id number] thru 2207 TAMICA

## 2025-08-01 NOTE — PROGRESS NOTES
Pt transferred via bed to , meds and personal items brought to floor, report given to Sandra JACKSON.    Pt A/O x4, 2L NC, VSS, last BM PTA, voids to bedside urinal, denies pain during shift, PIV x1.

## 2025-08-01 NOTE — PLAN OF CARE
Occupational Therapy: Orders received. Chart reviewed and discussed with care team.? Occupational Therapy not indicated. Per PT, pt mainly limited by increased O2 and tolerance. PT to address strengthening and mobility with goal for pt to discharge back to TCU. Pt to be reassessed at TCU for OT needs. No acute OT at this time.? Defer discharge recommendations to PT.? Will complete orders.   .

## 2025-08-01 NOTE — PROGRESS NOTES
"[facility]    Medicine Progress Note - Hospitalist Service, GOLD TEAM 6    Date of Admission:  7/30/2025    Assessment & Plan   Jerad Ross is a 65 year old male with history of FSGS s/p DDKT (1978, 1993), HTN, CAD s/p CABG, NSTEMI (2014), PE, ?COPD vs restrictive lung disease, chronic HBV, GERD, MDD, anxiety, AUD (in remission), chronic anemia, and recent admission 7/9/25 for AHRF due to rhinovirus and newly diagnosed HFpEF, discharged to [facility] on 7/21/25 who presents with acute anemia of unclear etiology. Admitted to medicine for further evaluation and management.     Updates for 8/1/2025:  - Monitor overnight clinically  - Trend Hgb  - PT/OT following  - Possible transfer back to TCU 8/2 if stable      # Acute on Chronic Anemia -  Developed new onset nausea on 7/30 then had transient hypoxic event. Repeat labs revealed Hgb 8.4 (previously 12.3 on 7/24) without overt e/o bleeding. Concern for possible occult GI bleed. Hemolysis felt less likely with normal LDH, bilirubin, and Haptoglobin. CT AP negative. GI consulted, s/p EGD 7/31 showed gastritis, otherwise no clear source of bleeding. Hgb stable at 7.4 today. Still no signs of active or recent bleed.   - GI consulted, now signed off. Recommend outpatient capsule endoscopy to look for source.  - CBC in AM    # Elevated troponin, likely Demand Ischemia -  Trop 36 - 36 - 35.  EKG negative for ischemic changes.   - Monitor clinically  - Repeat EKG and Trop if having chest pain     # Transient Acute on Subacute Hypoxic Respiratory Failure - Developed transient hypoxia while working with OT on 7/30. O2 sat 80% on BL 2L, then improved to 95% with deep breathing. Felt \"off\" during OT but denies overt dizziness or lightheadedness. CT Chest PE negative for PE, showed improved multifocal GGO throughout lungs. Now back to baseline. Denies any cardiopulmonary complaints. Afebrile. WBC normal. New infectious process not " suspected.  - CTM  - Wean O2 as able, goal SPO2 >90%     Acute on Chronic Hypoxic Respiratory Failure  Rhinovirus infection  Possible COPD - H/o COPD noted in chart but PFTs appeared more c/w mild restrictive lung disease. Presented with dyspnea and hypoxia initially requiring BiPAP. CT chest revealed multifocal GGOs, negative for PE. RVP +for rhinovirus. HFpEF exacerbation also contributing to AHRF, diuresed with IV Lasix. TTE also revealed new LV diastolic dysfunction and he was diuresed with IV Lasix  - Continue Breo Ellipta, prn albuterol   - Continue droplet precautions until symptoms resolved  - O2 PRN to maintain SpO2 >90%  - Consider repeat PFTs as outpatient     HFpEF with Acute Decompensation  CAD s/p CABG  NSTEMI () - New diagnosis. TTE  grade I LV diastolic dysfunction, EF 55-65%. CXR with pulmonary edema. Diuresed with IV Lasix. EDW ~152 lbs. Currently appears euvolemic  - Continue Jardiance, ASA 81 daily, PTA statin  - Hold metoprolol and Imdur in case of underlying bleeding   - Needs Cardiology follow-up, referral placed but appointment not yet scheduled     FSGS s/p renal transplant x2 (, ) - Renal function stable. No acute issues this admission   - Continue PTA prednisone,  mg bid (dose initially  due to rhinovirus, resumed PTA dosing )     Other Stable Medical Issues:  Orthostatic hypotension: Resolved. 2/2 doing OT prior to having breakfast. No further events  after OT was moved to later in the day  Physical deconditioning: PT, OT consult  Chronic HBV: Continue PTA entecavir  Depression, anxiety: Continue fluoxetine, prn hydroxyzine  Insomnia: Melatonin prn  GERD: Continue PPI   Glaucoma: Continue PTA latanoprost drops  Alcohol use disorder, in remission: Continue naltrexone  H/o PE: Provoked after hip surgery in 2023. Not maintained on AC  H/o MITZI: Has visit with Dr. Bartholomew on   Moderate malnutrition: Low threshold for nutrition consult. Continue  snacks/supplements with meals               Diet: Regular Diet Adult    DVT Prophylaxis: Pneumatic Compression Devices  Blackwood Catheter: Not present  Lines: None     Cardiac Monitoring: None  Code Status: No CPR- Do NOT Intubate      Clinically Significant Risk Factors         # Hyponatremia: Lowest Na = 134 mmol/L in last 2 days, will monitor as appropriate       # Hypoalbuminemia: Lowest albumin = 2.8 g/dL at 7/30/2025 12:24 PM, will monitor as appropriate     # Hypertension: Noted on problem list                # Financial/Environmental Concerns: none         Social Drivers of Health    Tobacco Use: Medium Risk (7/30/2025)    Patient History     Smoking Tobacco Use: Former     Smokeless Tobacco Use: Former     Passive Exposure: Never   Physical Activity: Insufficiently Active (11/5/2024)    Exercise Vital Sign     Days of Exercise per Week: 5 days     Minutes of Exercise per Session: 20 min   Stress: Stress Concern Present (11/5/2024)    [employer] - Occupational Stress Questionnaire     Feeling of Stress : To some extent   Social Connections: Unknown (11/5/2024)    Social Connection and Isolation Panel [NHANES]     Frequency of Social Gatherings with Friends and Family: More than three times a week          Disposition Plan     Medically Ready for Discharge: Anticipated Tomorrow           The patient's care was discussed with the Attending Physician, Dr. Cheung, Care Coordinator/, and Patient.    Bebeto Jimenez PA-C  Hospitalist Service, GOLD TEAM [address] [place] [address]  Securely message with [employer] (more info)  Text page via [facility] Paging/Directory   See signed in provider for up to date coverage information  ______________________________________________________________________    Interval History   No acute events overnight. No further episodes of nausea or dyspnea. O2 sats stable. Patient reports feeling back to recent baseline.  No BM in several days.    Physical Exam   Vital Signs: Temp: 98.5  F (36.9  C) Temp src: Oral BP: 107/68 Pulse: 73   Resp: 16 SpO2: 96 % O2 Device: Nasal cannula Oxygen Delivery: 2 LPM  Weight: 160 lbs 0 oz    General Appearance:  Awake. Alert. Oriented x3. NAD.   HEENT:  Unremarkable.   Respiratory:  Normal effort. CTAB. No wheezing, rhonchi, rales.   Cardiovascular:  RRR. S1/S2. No murmurs.   GI:  Soft, non-distended, non-tender, +BS.   Extremity:  No pitting edema.   Skin:  No visible rash.   Neuro:  Grossly non-focal.      Medical Decision Making       50 MINUTES SPENT BY ME on the date of service doing chart review, history, exam, documentation & further activities per the note.      Data     I have personally reviewed the following data over the past 24 hrs:    6.6  \   7.4 (L)   / 283     134 (L) 98 12.2 /  71   3.5 21 (L) 0.69 \       Imaging results reviewed over the past 24 hrs:   No results found for this or any previous visit (from the past 24 hours).

## 2025-08-01 NOTE — PROGRESS NOTES
Brief transplant Nephrology update     Noted pt's creatinine been stable and remained hemodynamically stable. No change in his immunosuppression at this time.    Please page with any questions or concerns    Harriet Hyde MD  Page [phone]

## 2025-08-01 NOTE — PLAN OF CARE
Shift Hours: 0700 - 1900    Assessment:  Body systems that were not at patient's baseline Respiratory and Safety. Focused body system assessments documented in flowsheets.        Activity     Fall Risk Score: 65   Bed alarm on? Yes     Activity Assistance Provided: assistance, 1 person      Assistive Device Utilized: walker    Pain: mild headache in afternoon, managed with prn     Labs/RN Managed Protocols: trending hemoglobin, 7.4    Lines/Drains: R piv saline locked     Nutrition: regular     Goal Outcome Evaluation  Plan of Care Reviewed With: patient  Overall Patient Progress: no change  Outcome Evaluation: Patient remians on 2L NC. VSS. BG was low in morning, resolved with applejuice. Denies chest pain. Was up in frank with PT, c/o some SOB with exertion. Pt continues to have frequent dry cough, prn tessalon given. C/o headache x1, prn tylenol with adequate relief. Hemoglobin remained stable, 7.4.    Barriers to Discharge:   Potential discharge 8/2 if stable

## 2025-08-02 ASSESSMENT — ACTIVITIES OF DAILY LIVING (ADL)
ADLS_ACUITY_SCORE: 38
ADLS_ACUITY_SCORE: 39
ADLS_ACUITY_SCORE: 38
ADLS_ACUITY_SCORE: 38
ADLS_ACUITY_SCORE: 39
ADLS_ACUITY_SCORE: 36
ADLS_ACUITY_SCORE: 39
ADLS_ACUITY_SCORE: 36
ADLS_ACUITY_SCORE: 36
ADLS_ACUITY_SCORE: 38
ADLS_ACUITY_SCORE: 36
ADLS_ACUITY_SCORE: 36
ADLS_ACUITY_SCORE: 38
ADLS_ACUITY_SCORE: 36
ADLS_ACUITY_SCORE: 38
ADLS_ACUITY_SCORE: 39
ADLS_ACUITY_SCORE: 38
ADLS_ACUITY_SCORE: 39
ADLS_ACUITY_SCORE: 39

## 2025-08-02 NOTE — PROGRESS NOTES
Pt stated he doesn't use CPAP at home and declined when asked to place him on hospital machine.    Amanda Portillo, RT

## 2025-08-02 NOTE — PLAN OF CARE
Goal Outcome Evaluation:   Care [id number]      Plan of Care Reviewed With: patient    Overall Patient Progress: no changeOverall Patient Progress: no change    Outcome Evaluation: PAtient is A&Ox4 able to wean down to 1L NC due to coughing so frequently patient desatted and had to be put on 1 l NC. Trying to get cough under control, doctors changed tessalon to scheduled and added prn robitussin with codeine. Potassium was replaced and back up to 4.0. PAtient denies pain at this time. Call light and belongins within reach no alarm set at this time. Asked patient to please call for assistance. Will continue to monitor.    Went for a walk to the small [facility] waiting area. Patient de-sated to low 80s and was short of breath had to turn up to 2 liters took a couple minutes but patient went back up to 98% and patient was feeling more comfortable. Updated team. Will continue to monitor.

## 2025-08-02 NOTE — PROGRESS NOTES
[facility]    Medicine Progress Note - Hospitalist Service, GOLD TEAM 6    Date of Admission:  7/30/2025    Assessment & Plan   Jerad Ross is a 65 year old male with history of FSGS s/p DDKT (1978, 1993), HTN, CAD s/p CABG, NSTEMI (2014), PE, ?COPD vs restrictive lung disease, chronic HBV, GERD, MDD, anxiety, AUD (in remission), chronic anemia, and recent admission 7/9/25 for AHRF due to rhinovirus and newly diagnosed HFpEF, discharged to [facility] on 7/21/25 who presents with acute anemia of unclear etiology. Admitted to medicine for further evaluation and management.     Updates for 8/2/2025:  - Medically ready for discharge  - Awaiting insurance authorization for TCU placement  - Continue to monitor for signs of bleeding  - Repeat CBC in a.m.      # Acute on Chronic Anemia -  Developed new onset nausea on 7/30 then had transient hypoxic event. Labs revealed Hgb 8.4, down from 12.3 on 7/24. No overt signs of bleeding. Peripheral smear negative for hemolysis. LDH, bilirubin, and haptoglobin normal. CT AP negative. Concern for possible GI bleed. GI consulted. EGD 7/31 showed gastritis, otherwise no clear source of bleeding. Lower GI source not suspected. Likely self-limiting upper GI bleed, possibly from small bowel. Clinically improved. Hgb stable at 7.3 today.  - Monitor for signs of bleeding   - GI recommends outpatient capsule endoscopy to further evaluate source  - Repeat CBC in AM    # Elevated troponin, likely Demand Ischemia -  Trop 36 - 36 - 35.  EKG negative for ischemic changes. No chest pain. ACS not suspected.   - Repeat EKG and Trop PRN     # Acute on Chronic Hypoxic Respiratory Failure  # Rhinovirus infection  # Possible COPD - H/o COPD noted in chart but PFTs appeared more c/w mild restrictive lung disease. Presented with dyspnea and hypoxia (transient O2 sat 80% on 2L) initially requiring BiPAP. CT Chest PE negative for PE, showed improved  multifocal GGO throughout lungs. Hx recent rhinovirus infection and newly diagnosed HFpEF. TTE revealed new LV diastolic dysfunction - improved with IV Lasix. Discharged on O2 @ 2 L/min. SpO2 consistently 100%. Unclear if truly needs supplemental O2.  - Continue Breo Ellipta, prn albuterol   - Continue droplet precautions until symptoms resolved  - Wean O2 to maintain SpO2 >90%  - Consider repeat PFTs as outpatient     # HFpEF with Acute Decompensation  # CAD s/p CABG  # NSTEMI () - New diagnosis. TTE  grade I LV diastolic dysfunction, EF 55-65%. CXR with pulmonary edema. Diuresed with IV Lasix. EDW ~152 lbs. Currently appears euvolemic  - Continue Jardiance, ASA 81 daily, PTA statin  - Hold metoprolol and Imdur in case of underlying bleeding   - Needs Cardiology follow-up, referral placed but appointment not yet scheduled     # FSGS s/p renal transplant x2 (, ) - Renal function stable. No acute issues this admission   - Continue PTA prednisone,  mg bid (dose initially  due to rhinovirus, resumed PTA dosing )     Other Stable Medical Issues:  # Orthostatic hypotension:  Resolved.   # Physical deconditioning: PT, OT consult  # Chronic HBV: Continue PTA entecavir  # Depression, anxiety: Continue fluoxetine, prn hydroxyzine  # Insomnia: Melatonin prn  # GERD: Continue PPI   # Glaucoma: Continue PTA latanoprost drops  # Alcohol use disorder, in remission: Continue naltrexone  # H/o PE: Provoked after hip surgery in 2023. Not maintained on AC. CTA Chest negative (see above).  # H/o MITZI: Has visit with Dr. Bartholomew on   # Moderate malnutrition: Low threshold for nutrition consult. Continue snacks/supplements with meals               Diet: Regular Diet Adult    DVT Prophylaxis: Pneumatic Compression Devices  Blackwood Catheter: Not present  Lines: None     Cardiac Monitoring: None  Code Status: No CPR- Do NOT Intubate      Clinically Significant Risk Factors        # Hypokalemia:  Lowest K = 3.2 mmol/L in last 2 days, will replace as needed  # Hyponatremia: Lowest Na = 134 mmol/L in last 2 days, will monitor as appropriate       # Hypoalbuminemia: Lowest albumin = 2.8 g/dL at 7/30/2025 12:24 PM, will monitor as appropriate     # Hypertension: Noted on problem list                # Financial/Environmental Concerns: none         Social Drivers of Health    Housing Stability: High Risk (8/1/2025)    Housing Stability     Do you have housing? : Yes     Are you worried about losing your housing?: Yes   Tobacco Use: Medium Risk (7/30/2025)    Patient History     Smoking Tobacco Use: Former     Smokeless Tobacco Use: Former     Passive Exposure: Never   Physical Activity: Insufficiently Active (11/5/2024)    Exercise Vital Sign     Days of Exercise per Week: 5 days     Minutes of Exercise per Session: 20 min   Stress: Stress Concern Present (11/5/2024)    [employer] - Occupational Stress Questionnaire     Feeling of Stress : To some extent   Social Connections: Unknown (11/5/2024)    Social Connection and Isolation Panel [NHANES]     Frequency of Social Gatherings with Friends and Family: More than three times a week          Disposition Plan     Medically Ready for Discharge: Ready Now           The patient's care was discussed with the Attending Physician, Dr. Cheung, Care Coordinator/, and Patient.    Bebeto Jimenez PA-C  Hospitalist Service, [address]  Securely message with [employer] (more info)  Text page via [facility] Paging/Directory   See signed in provider for up to date coverage information  ______________________________________________________________________    Interval History   No acute events overnight.  Patient reports feeling back to recent baseline.  No recurrent episodes of nausea or dyspnea.  Denies any chest pain.  No fevers chills or cough.  No GI or  complaints.  Patient still  somewhat concerned about etiology of anemia.  No melena or hematochezia noted.  No other signs of bleeding.    Physical Exam   Vital Signs: Temp: 97.7  F (36.5  C) Temp src: Oral BP: 99/60 Pulse: 63   Resp: 18 SpO2: 98 % O2 Device: Nasal cannula Oxygen Delivery: 2 LPM  Weight: 160 lbs 0 oz    Constitutional: awake, alert, cooperative, no apparent distress, and appears stated age  Eyes: No scleral icterus  ENT: Moist mucous membranes  Respiratory: Normal effort.  CTAB.  No wheezing, rhonchi or rales.  Cardiovascular: regular rate and rhythm, normal S1 and S2, and no murmur noted  GI: Soft, nondistended, nontender, positive bowel sounds.  Neurologic: Grossly nonfocal.    Medical Decision Making       40 MINUTES SPENT BY ME on the date of service doing chart review, history, exam, documentation & further activities per the note.      Data     I have personally reviewed the following data over the past 24 hrs:    6.2  \   7.3 (L)   / 291     138 103 13.1 /  110 (H)   3.2 (L) 26 0.68 \       Imaging results reviewed over the past 24 hrs:   No results found for this or any previous visit (from the past 24 hours).

## 2025-08-02 NOTE — PROGRESS NOTES
[facility] [place]  Transplant Nephrology Progress Note  Date of Admission:  7/30/2025  Today's Date: 08/02/2025  Requesting physician: Ron Cheung MD    Recommendations:   - continue on current immunosuppression   - no acute indications for dialysis     Assessment & Plan   # DDKT: Stable   - Baseline Creatinine: ~ 0.7-0.9   - Proteinuria: Normal (<0.2 grams)   - DSA Hx: Not checked recently due to time from transplant   - Last cPRA: unknown%   - BK Viremia: Not checked recently due to time from transplant   - Kidney Tx Biopsy Hx: No history of acute rejection.    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     # Immunosuppression: Mycophenolate mofetil (dose 750 mg every 12 hours) and Prednisone (dose 5 mg daily)   - Induction with Recent Transplant:  Not known due to time from transplant   - Continue with intensive monitoring of immunosuppression for efficacy and toxicity.   - Historical Changes in Immunosuppression: None   - Changes: Not at this time    # Infection Prevention:   Last CD4 Level: Not checked  - PJP: None      - CMV IgG Ab High Risk Discordance (D+/R-) at time of transplant: Unknown  Present CMV Serostatus: Negative  - EBV IgG Ab High Risk Discordance (D+/R-) at time of transplant: Unknown  Present EBV Serostatus: Unknown    # Hypertension: Hypotensive;  Goal BP: > 100, but < 130 systolic   - Changes: Not at this time    # Diabetes: Controlled (HbA1c <7%)  Last HbA1c: 6% last in chart is in 2022    # Anemia in Chronic Renal Disease: Hgb: Trend down  in setting of probable GI bleeding    CAROL ANN: No   - Iron studies: Low iron saturation, will order iron studies    # Mineral Bone Disorder:    - Vitamin D; level: Not checked recently, but was normal last check        On supplement: No  - Calcium; level: Normal        On supplement: No    # Electrolytes:  - Potassium; level: Normal        On supplement: No  - Bicarbonate; level: Normal        On  supplement: No  - Sodium; level: Normal    # Acute hypoxic respiratory failure:  # Pulmonary edema, resolved.   # Viral versus Atypical Pneumonia: + Rhino/enterovirus. CT PE negative. Gram + Bcx on 07/09, likely contaminant. Repeat blood cultures 07/10. Sputum culture 7/12 likely contaminated, per report. Repeat pending. CXR 7/13 with pulmonary edema. Started on ceftriaxone and currently on prednisone 40 mg daily and doxycycline.               - CXR 7/16 with pulmonary edema and bilateral pleural effusions.   - Management per primary team. Pulmonology following.     # Acute decompensated heart failure: echo in 2023 with no evidence of HF. BNP 1800 on 7/13. PTA on 20 mg lasix PO daily.               - Echo 7/14; EF 55-65% with grade I diastolic dysfunction. No pericardial effusion.              - Management per primary team.     # Other Significant PMH:   - CAD, s/p CABG: Last cardiac stress test was abnormal in 6/2023 (but unchanged from prior testing in 2022).  Coronary angiogram 9/2021 that showed some possible obstructive disease in the 1st diagonal, but unable to stent it.  Bypass grafts were open.  Plan is for medical management. Last cardiac echo (8/2023) showed LVEF ~ 60-65%. Normal right ventricular systolic function. Normal diastolic dysfunction.               - Provoked PE (8/2023): after right hip revision surgery. Completed AC.   - Chronic Hepatitis B: Stable on entecavir. Follows here with hepatology.   - Asthma: Some increase in shortness of breath, but felt more cardiac in origin.  Patient is stable on inhalers.   - GERD: Asymptomatic on pantoprazole, but with h/o ulcer, will continue on medication.   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    Harriet Hyde MD  Transplant Nephrology  Contact information via [facility] Paging/Directory    Interval History  Pt on RA, but had bout of cough while in the room. He will try lozenges for now.   No chest pain or SOB. No N/V/D noted today.    Review of Systems   4 point ROS was obtained and negative except as noted in the Interval History.    MEDICATIONS:  Current Facility-Administered Medications   Medication Dose Route Frequency Provider Last Rate Last Admin    aspirin EC tablet 81 mg  81 mg Oral Daily Abby Goddard PA-C   81 mg at 08/02/25 0940    benzonatate (TESSALON) capsule 100 mg  100 mg Oral TID Bebeto Jimenez PA-C   100 mg at 08/02/25 1544    empagliflozin (JARDIANCE) tablet 10 mg  10 mg Oral Daily Abby Goddard PA-C   10 mg at 08/02/25 0810    entecavir (BARACLUDE) tablet 0.5 mg  0.5 mg Oral Daily Abby Goddard PA-C   0.5 mg at 08/02/25 0810    FLUoxetine (PROzac) capsule 60 mg  60 mg Oral Daily Abby Goddard PA-C   60 mg at 08/02/25 0810    fluticasone-vilanterol (BREO ELLIPTA) 100-25 MCG/ACT inhaler 1 puff  1 puff Inhalation Daily Abby Goddard PA-C   1 puff at 08/02/25 0806    [Held by provider] isosorbide mononitrate (IMDUR) 24 hr tablet 60 mg  60 mg Oral Daily Abby Goddard PA-C        latanoprost (XALATAN) 0.005 % ophthalmic solution 1 drop  1 drop Both Eyes At Bedtime Abby Goddard PA-C   1 drop at 08/01/25 2215    metoprolol succinate ER (TOPROL-XL) 24 hr half-tab 12.5 mg  12.5 mg Oral Daily Bebeto Jimenez PA-C        multivitamin w/minerals (THERA-VIT-M) tablet 1 tablet  1 tablet Oral Daily Abby Goddard PA-C   1 tablet at 08/02/25 0811    mycophenolate (GENERIC EQUIVALENT) capsule 750 mg  750 mg Oral BID IS Abby Goddard PA-C   750 mg at 08/02/25 0811    naltrexone (DEPADE/REVIA) tablet 50 mg  50 mg Oral Daily Abby Goddard PA-C   50 mg at 08/02/25 0811    pantoprazole (PROTONIX) EC  "tablet 40 mg  40 mg Oral BID AC Abby Goddard PA-C   40 mg at 25 1544    predniSONE (DELTASONE) tablet 5 mg  5 mg Oral Daily Abby Goddard PA-C   5 mg at 25 0811    rosuvastatin (CRESTOR) tablet 20 mg  20 mg Oral Daily Abby Goddard PA-C   20 mg at 25 0812    sodium chloride (PF) 0.9% PF flush 3 mL  3 mL Intracatheter Q8H NE Abby Goddard PA-C   3 mL at 25 1434     Current Facility-Administered Medications   Medication Dose Route Frequency Provider Last Rate Last Admin       Physical Exam   Temp  Av.8  F (36.6  C)  Min: 97.3  F (36.3  C)  Max: 99.9  F (37.7  C)      Pulse  Av.9  Min: 53  Max: 98 Resp  Av.3  Min: 16  Max: 20  SpO2  Av.7 %  Min: 77 %  Max: 100 %     BP (!) 124/99 (BP Location: Left arm)   Pulse 79   Temp 98.5  F (36.9  C) (Oral)   Resp 18   Ht 1.727 m (5' 8\")   Wt 72.6 kg (160 lb)   SpO2 96%   BMI 24.33 kg/m     Date 25 07 - 25 0659   Shift 1772-3674 2077-1447 4058-9641 24 Hour Total   INTAKE   Shift Total       OUTPUT   Urine 400   400   Shift Total 400   400   Weight (kg)          Admit       GENERAL APPEARANCE: sleepy but easily arousable and no distress  EYES: eyes grossly normal to inspection  HENT: normal cephalic/atraumatic  RESP: lungs clear to auscultation  CV: regular rhythm, normal rate  EDEMA: trace LE edema bilaterally  ABDOMEN: soft, nondistended, nontender  MS: extremities normal - no gross deformities noted  NEURO: mentation intact and speech normal  PSYCH: mentation appears normal and affect normal/bright  TX KIDNEY: normal    Data   All labs reviewed by me.  CMP  Recent Labs   Lab 25  1312 25  0611 25  0749 25  0730 25  0610 25  0536 25  1633 25  1224   NA  --  138  --   --  134* 136 135 136   POTASSIUM 4.0 3.2*  --   --  3.5 3.7 4.5 4.2   CHLORIDE  --  103  --   --  98 101 99 100   CO2  --  26  --   --  * 25 28 25   ANIONGAP  --  9  --   --  15 10 8 11   GLC  " --  110* 71 65* 57* 73 107* 137*   BUN  --  13.1  --   --  12.2 15.8 20.9 23.4*   CR  --  0.68  --   --  0.69 0.74 0.71 0.70   GFRESTIMATED  --  >90  --   --  >90 >90 >90 >90   HEMA  --  8.5*  --   --  8.6* 8.8 8.6* 8.2*   MAG  --   --   --   --   --   --   --  1.8   PROTTOTAL  --   --   --   --   --  5.2* 5.3* 5.1*   ALBUMIN  --   --   --   --   --  2.9* 3.0* 2.8*   BILITOTAL  --   --   --   --   --  0.5 0.5 0.5   ALKPHOS  --   --   --   --   --  45 48 50   AST  --   --   --   --   --  23 26 21   ALT  --   --   --   --   --  12 11 14     CBC  Recent Labs   Lab 08/02/25  0611 08/01/25  0610 07/31/25  2104 07/31/25  0536 07/30/25  1633   HGB 7.3* 7.4* 7.6* 7.4* 8.0*   WBC 6.2 6.6  --  7.3 7.7   RBC 2.57* 2.58*  --  2.54* 2.70*   HCT 23.9* 24.1*  --  24.2* 25.8*   MCV 93 93 96 95 96   MCH 28.4 28.7  --  29.1 29.6   MCHC 30.5* 30.7*  --  30.6* 31.0*   RDW 17.0* 17.0*  --  16.8* 16.6*    283  --  266 289     INR  Recent Labs   Lab 07/31/25  0536 07/30/25  1633 07/30/25  1404   INR 1.12 1.09 1.10   PTT  --  33  --      ABG  Recent Labs   Lab 07/30/25  1731 07/30/25  1224   O2PER 29 21      Urine Studies  Recent Labs   Lab Test 07/09/25  1346 08/30/23  1856 08/20/23  1245 08/11/23  1928 06/03/20  1307   COLOR Yellow Yellow Light Yellow Light Yellow Yellow   APPEARANCE Slightly Cloudy* Clear Clear Clear Clear   URINEGLC Negative Negative Negative Negative Negative   URINEBILI Negative Negative Negative Negative Negative   URINEKETONE Trace* 10* 60* 10* Negative   SG 1.015 1.021 1.015 1.009 1.008   UBLD Negative Negative Negative Negative Negative   URINEPH 6.0 5.0 6.0 6.0 6.5   PROTEIN 20* 10* 20* 10* Negative   UROBILINOGEN  --   --   --   --  <2.0 E.U./dL   NITRITE Negative Negative Negative Negative Negative   LEUKEST Negative Negative Negative Negative Negative   RBCU 1 <1 <1 0  --    WBCU 7* 3 1 3  --      Recent Labs   Lab Test 10/28/20  0902 10/01/19  0942 04/02/19  0917 10/23/18  1122 06/21/18  1209   [facility]  0.23* 0.26* 0.23* 0.21* 0.12     PTH  No lab results found.  Iron Studies  Recent Labs   Lab Test 07/31/25  0536 10/02/23  0943   IRON 23* 30*   * 286   IRONSAT 10* 10*    50       IMAGING:  All imaging studies reviewed by me.

## 2025-08-02 NOTE — PROGRESS NOTES
Care Management Follow Up    Length of Stay (days): 3    Expected Discharge Date: 08/02/2025     Concerns to be Addressed: discharge planning       Patient plan of care discussed at interdisciplinary rounds: Yes    Anticipated Discharge Disposition: Transitional Care     Anticipated Discharge Services: Transportation, Therapies @ TCU    Anticipated Discharge DME: Oxygen    Patient/family educated on Medicare website which has current facility and service quality ratings: no (Agreeable to return to [facility])    Education Provided on the Discharge Plan: Yes    Patient/Family in Agreement with the Plan: yes    Referrals Placed by CM/SW:     Destination    Service Provider Request Status Services Address Phone Fax Patient Preferred   [facility] Considering  Need insurance authorization -- [address] [id number] [phone] --      Private pay costs discussed: Not applicable    Discussed  Partnership in Safe Discharge Planning  document with patient/family: No     Handoff Completed: Yes, [facility] PCP: Internal handoff referral completed    Additional Information: TAMICA sent message to Bebeto Jimenez asking if pt is looking med ready to transfer back to  TCU today.    TAMICA spoke with  TCU liaison Yaima Isbell who reported they do not yet have the insurance auth from [employer].    [place] back from provider that pt is medically ready for discharge.    Asked Yaima again who reported she found out [employer] doesn't do prior auths on the weekend, so Jerad's transfer back to  TCU will need to wait until Monday at the earliest.    Next Steps: TAMICA to continue to follow to coordinate discharge.    NAMAN Guadalupe  Weekend Covering   [facility] Acute Care Management  Searchable on Vocera: 7C Med Surg 7401 thru 7418 TAMICA, 7C Med Surg 2962 thru 2828 TAMICA

## 2025-08-02 NOTE — PLAN OF CARE
Shift Hours: 0700 - 1500    Assessment:  Body systems that were not at patient's baseline Respiratory. Focused body system assessments documented in flowsheets.        Activity     Fall Risk Score: 85   Bed alarm on? Yes     Activity Assistance Provided: assistance, 1 person      Assistive Device Utilized: walker    Pain: denies     Labs/RN Managed Protocols: K 3.2, needed replacement     Lines/Drains: R piv remains saline locked     Nutrition: regular diet, good appetite. No N/V     Goal Outcome Evaluation  Plan of Care reviewed with: patient  Overall patient progress: no change  Outcome eval: Was able to wean patient off O2, with coughing episodes sats will drop <90, intermittently needed 1L NC. Prn tessalon and robitussin given, not effective in suppressing cough. Paged provider and modified robitussin orders. Remains on continuous pulse ox. K 3.2, RN managed protocol ordered     Barriers to Discharge:   Insurance auth for TCU. Continue to monitor signs of bleeding

## 2025-08-02 NOTE — PLAN OF CARE
Shift Hours: 1900 - 0700    Assessment:  Body systems that were not at patient's baseline Respiratory. Focused body system assessments documented in flowsheets.        Activity     Fall Risk Score: 85   Bed alarm on? Yes     Activity Assistance Provided: assistance, 1 person      Assistive Device Utilized: walker    Pain: Shoulders and hip, prn tylenol given with relief    Labs/RN Managed Protocols: Trending Hgb, 7.3 this morning    Lines/Drains: R PIV    Nutrition: Regular diet    Goal Outcome Evaluation  Plan of Care Reviewed With: patient  Overall Patient Progress: no change  Outcome Evaluation: Pt A&Ox4, VSS on 2L NC. Pt requesting prn tessalon for intermittent productive cough. No BM's, using urinal at bedside. No acute changes overnight.     Barriers to Discharge:   Possible discharge back to TCU today

## 2025-08-03 ASSESSMENT — ACTIVITIES OF DAILY LIVING (ADL)
ADLS_ACUITY_SCORE: 39

## 2025-08-03 NOTE — SUMMARY OF CARE
Provider notified (name): Bebteo Jimenez  Reason for notification: Patients SPO2 monitor kept alarming extreme bradycardia, but when I listened patient was in 60s and irregular. Put on tele just to monitor heart rate. Patient has not dropped below 60 but is irregular and QTC >500. Patient is vitally stable no chest pain or palpitations that patient can recall.   Recommendation/request given to provider: Just updated and wanted to see if they wanted me to leave on tele or remove it.   Response from provider: Waiting for response.     Doctor responded with ordering an EKG and said to keep telemetry for now.

## 2025-08-03 NOTE — PLAN OF CARE
Goal Outcome Evaluation:   Care [id number]      Plan of Care Reviewed With: patient    Overall Patient Progress: no changeOverall Patient Progress: no change    Outcome Evaluation: Patient is A&Ox4 call light and belongings are within reach. Patient is stating he is having some discomfort in his back and shoulders but at this time is tolerable. Heat pack in room when they are needed. Coughing does not seem to be as frequent as yesterday, but not able to get him off o2 at this time evertytime off o2 desats to 88. Will contninue to monitor. potassium redraw at 1700 now at 3.7 re-ran protocol redraw in am.   Patient's SPO2 monitor repeatedly alarmed with extreme bradycardia. Every time I listened patient was in the 60s. Figured it was just the monitor but put on tele so I could make sure he wasn't dropping. Patient did not drop but was very irregular and QTC >500 consistently. Messaged team to let them know and see if they wanted us to leave tele on or remove. Doctor ordered an EKG and asked that we leave on telemetry for now.   Patient requests miralax in am did not wish to take before bed. Med is in PRNs

## 2025-08-03 NOTE — PLAN OF CARE
Shift Hours: 0700 - 1500    Assessment:  Body systems that were not at patient's baseline Respiratory and Safety. Focused body system assessments documented in flowsheets.        Activity     Fall Risk Score: 85   Bed alarm on? Yes     Activity Assistance Provided: assistance, 1 person      Assistive Device Utilized: walker    Pain: intermittent R shoulder pain, manage with tylenol and heat packs     Labs/RN Managed Protocols: K 3.0, 60mEq given in total     Lines/Drains: R piv saline locked     Nutrition: regular     Goal Outcome Evaluation  Plan of Care Reviewed With: patient  Overall Patient Progress: no change  Outcome Evaluation: Patient is A&Ox4. VSS. Intermittently on 0.5-1L NC throughout shift. Was able to wean completely off oxygen in AM but was not able to maintain O2 >90. Prn robitussin w/ codeine x2 for coughing. Pt c/o mild R shoulder pain, managed with prn tylenol and heat packs. RN managed K protocol was initated due to low K 3.0, two doses given with scheduled 1700 lab recheck.    Barriers to Discharge:   Pending TCU

## 2025-08-03 NOTE — SUMMARY OF CARE
Provider notified (name): Hayley Harvey   Reason for notification: Patent de-sated when walking to low 80s had to increase o2 to 2L and sit patient down to catch breath after several minutes patients o2 went back up to 100%. Will Continue to monitor.   Recommendation/request given to provider: Just updating will continue to monitor.   Response from provider: Waiting for response just updating so they are aware, patient is not in distress and is back to 1 L satting %

## 2025-08-03 NOTE — PROGRESS NOTES
[facility]    Medicine Progress Note - Hospitalist Service, GOLD TEAM 6    Date of Admission:  7/30/2025    Assessment & Plan   Jerad Ross is a 65 year old male with history of FSGS s/p DDKT (1978, 1993), HTN, CAD s/p CABG, NSTEMI (2014), PE, ?COPD vs restrictive lung disease, chronic HBV, GERD, MDD, anxiety, AUD (in remission), chronic anemia, and recent admission 7/9/25 for AHRF due to rhinovirus and newly diagnosed HFpEF, [facility] who presents with acute anemia of unclear etiology. Admitted to medicine for further evaluation and management.     Updates Today:  - Hgb stable/increased today, 7.7   - awaiting insurance acceptance prior to transferring to [facility]     Acute on chronic anemia   Developed new onset nausea on 7/30 then had transient hypoxic event. Labs revealed Hgb 8.4, down from 12.3 on 7/24. No overt signs of bleeding. Peripheral smear negative for hemolysis. LDH, bilirubin, and haptoglobin normal. CT AP negative. Concern for possible GI bleed. GI consulted. EGD 7/31 showed gastritis, otherwise no clear source of bleeding. Lower GI source not suspected. Likely self-limiting upper GI bleed, possibly from small bowel. Clinically improved.   - GI recommending outpatient capsule endoscopy to further evaluate source   - monitor for s/s of bleeding  - CBC in the AM    Elevated troponin, likely demand ischemia   Trop 36 - 36 - 35.  EKG negative for ischemic changes. No chest pain. ACS not suspected.   - repeat EKG and Trop PRN    Acute on chronic hypoxic respiratory failure  Rhinovirus infection  Possible COPD  H/o COPD noted in chart but PFTs appeared more c/w mild restrictive lung disease. Presented with dyspnea and hypoxia (transient O2 sat 80% on 2L) initially requiring BiPAP. CT Chest PE negative for PE, showed improved multifocal GGO throughout lungs. Hx recent rhinovirus infection and newly diagnosed HFpEF. TTE revealed  new LV diastolic dysfunction - improved with IV Lasix. Discharged on O2 @ 2 L/min. SpO2 consistently 100%. Unclear if truly needs supplemental O2.   - continue Breo and albuterol PRN   - continue droplet precautions until symptoms resolve  - wean O2 to maintain spo2 > 90%  - consider repeat PFTs as OP    HFpEF with acute decompensation  CAD s/p CABG  NSTEMI (2014)  New diagnosis. TTE 7/14 grade I LV diastolic dysfunction, EF 55-65%. CXR with pulmonary edema. Diuresed with IV Lasix. EDW ~152 lbs. Currently appears euvolemic.   - continue jardiance, ASA 81 mg, and PTA statin  - hold metoprolol and Imdur in case of underlying bleeding    FSGS s/p renal transplant x2 (1978, 1993)  Renal function stable. No acute issues this admission.   - continue PTA prednisone,  mg BID    Stable chronic medical conditions:  Orthostatic hypotension:  Resolved.   Physical deconditioning: PT, OT consult  Chronic HBV: Continue PTA entecavir  Depression, anxiety: Continue fluoxetine, prn hydroxyzine  Insomnia: Melatonin prn  GERD: Continue PPI   Glaucoma: Continue PTA latanoprost drops  Alcohol use disorder, in remission: Continue naltrexone  H/o PE: Provoked after hip surgery in 8/2023. Not maintained on AC. CTA Chest negative (see above).  H/o MITZI: Has visit with Dr. Bartholomew on 8/13  Moderate malnutrition: Low threshold for nutrition consult. Continue snacks/supplements with meals    Discharge planning:  - cardiology: referral placed for discharge, no appointment scheduled             Diet: Regular Diet Adult    DVT Prophylaxis: Pneumatic Compression Devices  Blackwood Catheter: Not present  Lines: None     Cardiac Monitoring: None  Code Status: No CPR- Do NOT Intubate      Clinically Significant Risk Factors        # Hypokalemia: Lowest K = 3 mmol/L in last 2 days, will replace as needed        # Hypoalbuminemia: Lowest albumin = 2.8 g/dL at 7/30/2025 12:24 PM, will monitor as appropriate     # Hypertension: Noted on problem list                 # Financial/Environmental Concerns: none         Social Drivers of Health    Housing Stability: High Risk (8/1/2025)    Housing Stability     Do you have housing? : Yes     Are you worried about losing your housing?: Yes   Tobacco Use: Medium Risk (7/30/2025)    Patient History     Smoking Tobacco Use: Former     Smokeless Tobacco Use: Former     Passive Exposure: Never   Physical Activity: Insufficiently Active (11/5/2024)    Exercise Vital Sign     Days of Exercise per Week: 5 days     Minutes of Exercise per Session: 20 min   Stress: Stress Concern Present (11/5/2024)    [place] [place] [employer] - Occupational Stress Questionnaire     Feeling of Stress : To some extent   Social Connections: Unknown (11/5/2024)    Social Connection and Isolation Panel [NHANES]     Frequency of Social Gatherings with Friends and Family: More than three times a week          Disposition Plan     Medically Ready for Discharge: Ready Now; awaiting disposition            The patient's care was discussed with the Attending Physician, Dr. Cheung and Care Coordinator/.    Eden Dobbs NP  Hospitalist Service, [address]  Securely message with [employer] (more info)  Text page via [facility] Paging/Directory   See signed in provider for up to date coverage information  ______________________________________________________________________    Interval History   Jerad was seen sitting up in bed on 1L NC. No acute events overnight. Dry cough this AM but feeling well otherwise. Denies any bleeding, melena, or hematochezia. Otherwise feeling back to baseline.     Physical Exam   Vital Signs: Temp: 98.5  F (36.9  C) Temp src: Oral BP: 106/63 Pulse: 80   Resp: 16 SpO2: 99 % O2 Device: Nasal cannula Oxygen Delivery: 1 LPM  Weight: 160 lbs 0 oz    GENERAL: Alert and awake. Answering questions appropriately. Oriented x 3. NAD. Pleasant and  conversational   HEENT: Anicteric sclera. EOMI. Mucous membranes moist   CARDIOVASCULAR: RRR. S1, S2. No murmurs, rubs, or gallops.   RESPIRATORY: Effort normal on 1L NC. Clear to auscultation bilaterally, no rales, rhonchi or wheezes  GI: Abdomen soft, non-tender abdomen without rebound or guarding, normoactive bowel sounds present  MUSCULOSKELETAL: Moves all extremities.   EXTREMITIES: No peripheral edema. No calf asymmetry, erythema, or tenderness.   NEUROLOGICAL: No focal deficits. Moving all extremities symmetrically.   SKIN: Intact. Warm and dry. No jaundice. No rashes on exposed skin   Medical Decision Making       45 MINUTES SPENT BY ME on the date of service doing chart review, history, exam, documentation & further activities per the note.      Data   Imaging results reviewed over the past 24 hrs:   No results found for this or any previous visit (from the past 24 hours).  Recent Labs   Lab 08/04/25  0630 08/03/25  1716 08/03/25  0702 08/02/25  1312 08/02/25  0611 07/31/25  2104 07/31/25  0536 07/30/25  1633 07/30/25  1404   WBC 11.0  --  7.8  --  6.2   < > 7.3 7.7  --    HGB 7.7*  --  7.4*  --  7.3*   < > 7.4* 8.0*  --    MCV 96  --  93  --  93   < > 95 96  --      --  320  --  291   < > 266 289  --    INR  --   --   --   --   --   --  1.12 1.09 1.10   *  --  135  --  138   < > 136 135  --    POTASSIUM 3.4 3.7 3.0*   < > 3.2*   < > 3.7 4.5  --    CHLORIDE 101  --  100  --  103   < > 101 99  --    CO2 22  --  25  --  26   < > 25 28  --    BUN 9.4  --  5.9*  --  13.1   < > 15.8 20.9  --    CR 0.54*  --  0.60*  --  0.68   < > 0.74 0.71  --    ANIONGAP 11  --  10  --  9   < > 10 8  --    HEMA 8.3*  --  8.3*  --  8.5*   < > 8.8 8.6*  --    GLC 81  --  88  --  110*   < > 73 107*  --    ALBUMIN  --   --   --   --   --   --  2.9* 3.0*  --    PROTTOTAL  --   --   --   --   --   --  5.2* 5.3*  --    BILITOTAL  --   --   --   --   --   --  0.5 0.5  --    ALKPHOS  --   --   --   --   --   --  45 48  --     ALT  --   --   --   --   --   --  12 11  --    AST  --   --   --   --   --   --  23 26  --     < > = values in this interval not displayed.

## 2025-08-03 NOTE — PROGRESS NOTES
[facility]    Medicine Progress Note - Hospitalist Service, GOLD TEAM 6    Date of Admission:  7/30/2025    Assessment & Plan   Jerad Ross is a 65 year old male with history of FSGS s/p DDKT (1978, 1993), HTN, CAD s/p CABG, NSTEMI (2014), PE, ?COPD vs restrictive lung disease, chronic HBV, GERD, MDD, anxiety, AUD (in remission), chronic anemia, and recent admission 7/9/25 for AHRF due to rhinovirus and newly diagnosed HFpEF, discharged to [facility] on 7/21/25 who presents with acute anemia of unclear etiology. Admitted to medicine for further evaluation and management.     Updates for 8/3/2025:  - Medically ready for discharge  - Awaiting insurance authorization for TCU placement  - Replace K per protocol   - Repeat CBC in a.m.      # Acute on Chronic Anemia -  Developed new onset nausea and transient hypoxic event on 7/30. Work up revealed an acute drop in Hgb 8.4, down from 12.3 on 7/24. No overt signs of bleeding. Peripheral smear negative for hemolysis. LDH, bilirubin, and haptoglobin normal. CT AP negative. Admitted d/t concern for possible GI bleed. GI consulted. S/p EGD 7/31 which showed gastritis, otherwise no clear source of bleeding. Lower GI source not suspected. ? Possible self-limiting GI bleed vs marrow suppression from Rhinovirus infection. Clinically improved. Hgb stable at 7.4 today.  - Monitor for signs of bleeding   - Retic index <2 indicating inadequate marrow response, anticipate slow improvement  - GI recommends outpatient capsule endoscopy to further evaluate source  - Repeat CBC in AM    # Elevated troponin, likely Demand Ischemia -  Trop 36 - 36 - 35.  EKG negative for ischemic changes. No chest pain. ACS not suspected.   - Repeat EKG and Trop PRN     # Acute on Chronic Hypoxic Respiratory Failure  # Rhinovirus infection  # Possible COPD - H/o COPD noted in chart but PFTs appeared more c/w mild restrictive lung disease. Presented with  dyspnea and hypoxia (transient O2 sat 80% on 2L) initially requiring BiPAP. CT Chest PE negative for PE, showed improved multifocal GGO throughout lungs. Hx recent rhinovirus infection and newly diagnosed HFpEF. TTE revealed new LV diastolic dysfunction - improved with IV Lasix. Discharged on O2 @ 2 L/min. SpO2 consistently 100%. Unclear if truly needs supplemental O2. Cough remains bothersome.  - Continue tessalon TID + robitussim AC PRN  - Continue Breo Ellipta, prn albuterol   - Continue droplet precautions until symptoms resolved  - Wean O2 to maintain SpO2 >90%  - Consider repeat PFTs as outpatient     # HFpEF with Acute Decompensation  # CAD s/p CABG  # NSTEMI () - New diagnosis. TTE  grade I LV diastolic dysfunction, EF 55-65%. CXR with pulmonary edema. Diuresed with IV Lasix. EDW ~152 lbs. Currently appears euvolemic  - Continue Jardiance, ASA 81 daily, PTA statin  - Hold metoprolol and Imdur in case of underlying bleeding   - Needs Cardiology follow-up, referral placed but appointment not yet scheduled     # FSGS s/p renal transplant x2 (, ) - Renal function stable. No acute issues this admission   - Continue PTA prednisone,  mg bid (dose initially  due to rhinovirus, resumed PTA dosing )     Other Stable Medical Issues:  # Orthostatic hypotension:  Resolved.   # Physical deconditioning: PT, OT consult  # Chronic HBV: Continue PTA entecavir  # Depression, anxiety: Continue fluoxetine, prn hydroxyzine  # Insomnia: Melatonin prn  # GERD: Continue PPI   # Glaucoma: Continue PTA latanoprost drops  # Alcohol use disorder, in remission: Continue naltrexone  # H/o PE: Provoked after hip surgery in 2023. Not maintained on AC. CTA Chest negative (see above).  # H/o MITZI: Has visit with Dr. Bartholomew on   # Moderate malnutrition: Low threshold for nutrition consult. Continue snacks/supplements with meals            Diet: Regular Diet Adult    DVT Prophylaxis: Pneumatic  Compression Devices  Blackwood Catheter: Not present  Lines: None     Cardiac Monitoring: None  Code Status: No CPR- Do NOT Intubate      Clinically Significant Risk Factors        # Hypokalemia: Lowest K = 3 mmol/L in last 2 days, will replace as needed        # Hypoalbuminemia: Lowest albumin = 2.8 g/dL at 7/30/2025 12:24 PM, will monitor as appropriate     # Hypertension: Noted on problem list                # Financial/Environmental Concerns: none         Social Drivers of Health    Housing Stability: High Risk (8/1/2025)    Housing Stability     Do you have housing? : Yes     Are you worried about losing your housing?: Yes   Tobacco Use: Medium Risk (7/30/2025)    Patient History     Smoking Tobacco Use: Former     Smokeless Tobacco Use: Former     Passive Exposure: Never   Physical Activity: Insufficiently Active (11/5/2024)    Exercise Vital Sign     Days of Exercise per Week: 5 days     Minutes of Exercise per Session: 20 min   Stress: Stress Concern Present (11/5/2024)    [employer] [place] [employer] - Occupational Stress Questionnaire     Feeling of Stress : To some extent   Social Connections: Unknown (11/5/2024)    Social Connection and Isolation Panel [NHANES]     Frequency of Social Gatherings with Friends and Family: More than three times a week          Disposition Plan     Medically Ready for Discharge: Ready Now           The patient's care was discussed with the Attending Physician, Dr. Cheung.    Bebeto Jimenez PA-C  Hospitalist Service, GOLD TEAM [address] [place] [address]  Securely message with [employer] (more info)  Text page via [facility] Paging/Directory   See signed in provider for up to date coverage information  ______________________________________________________________________    Interval History   Feeling well today. Cough persists but notes new medications have been effective in controlling. Mild dyspnea with activity and after coughing spells.  No chest pain. Denies any bleeding, melena, or hematochezia. Otherwise feeling back to baseline.     Physical Exam   Vital Signs: Temp: 98.5  F (36.9  C) Temp src: Oral BP: 106/63 Pulse: 80   Resp: 16 SpO2: 99 % O2 Device: Nasal cannula Oxygen Delivery: 1 LPM  Weight: 160 lbs 0 oz    General Appearance: Awake. Alert. Oriented x3. NAD.   HEENT:  Unremarkable.   Respiratory:  Normal effort. CTAB. No wheezing, rhonchi, rales.   Cardiovascular:  RRR. S1/S2. No murmurs.   GI:  Soft, non-distended, non-tender, +BS.   Extremity:  No pitting edema.   Skin:  No visible rash.   Neuro:  Grossly non-focal.      Medical Decision Making       40 MINUTES SPENT BY ME on the date of service doing chart review, history, exam, documentation & further activities per the note.      Data     I have personally reviewed the following data over the past 24 hrs:    7.8  \   7.4 (L)   / 320     135 100 5.9 (L) /  88   3.0 (L) 25 0.60 (L) \       Imaging results reviewed over the past 24 hrs:   No results found for this or any previous visit (from the past 24 hours).

## 2025-08-03 NOTE — PLAN OF CARE
"[id number]    /71 (BP Location: Left arm)   Pulse 64   Temp 98  F (36.7  C) (Oral)   Resp 18   Ht 1.727 m (5' 8\")   Wt 72.6 kg (160 lb)   SpO2 99%   BMI 24.33 kg/m       Neuro: Alert and Oriented x4  Cardiac: Apical and Radial pulse regular    Respiratory: 1L nasal cannula, cough, PRN robitussin w/ codeine given x1.  GI/: Urinal   Diet/appetite: Regular diet   Activity:  Assist of one, up with walker   Pain: Denies    Skin: Scattered bruising   LDA's: R PIV     Plan: Monitor  "

## 2025-08-04 ENCOUNTER — HOSPITAL ENCOUNTER (INPATIENT)
Facility: SKILLED NURSING FACILITY | Age: 65
DRG: 811 | End: 2025-08-04
Attending: INTERNAL MEDICINE | Admitting: INTERNAL MEDICINE
Payer: COMMERCIAL

## 2025-08-04 DIAGNOSIS — Z94.0 KIDNEY TRANSPLANTED: ICD-10-CM

## 2025-08-04 DIAGNOSIS — D84.9 IMMUNOSUPPRESSION: ICD-10-CM

## 2025-08-04 DIAGNOSIS — R06.02 SHORTNESS OF BREATH: ICD-10-CM

## 2025-08-04 DIAGNOSIS — R53.81 PHYSICAL DECONDITIONING: Primary | ICD-10-CM

## 2025-08-04 DIAGNOSIS — M25.552 BILATERAL HIP PAIN: ICD-10-CM

## 2025-08-04 DIAGNOSIS — M25.551 BILATERAL HIP PAIN: ICD-10-CM

## 2025-08-04 DIAGNOSIS — K21.9 GASTROESOPHAGEAL REFLUX DISEASE WITHOUT ESOPHAGITIS: ICD-10-CM

## 2025-08-04 DIAGNOSIS — Z95.1 S/P CABG (CORONARY ARTERY BYPASS GRAFT): ICD-10-CM

## 2025-08-04 DIAGNOSIS — Z94.0 HTN, KIDNEY TRANSPLANT RELATED: ICD-10-CM

## 2025-08-04 DIAGNOSIS — H40.053 BORDERLINE GLAUCOMA WITH OCULAR HYPERTENSION, BILATERAL: ICD-10-CM

## 2025-08-04 DIAGNOSIS — F41.1 GENERALIZED ANXIETY DISORDER: Chronic | ICD-10-CM

## 2025-08-04 DIAGNOSIS — E78.00 PURE HYPERCHOLESTEROLEMIA: ICD-10-CM

## 2025-08-04 DIAGNOSIS — I15.1 HTN, KIDNEY TRANSPLANT RELATED: ICD-10-CM

## 2025-08-04 PROCEDURE — 120N000009 HC R&B SNF

## 2025-08-04 PROCEDURE — 250N000013 HC RX MED GY IP 250 OP 250 PS 637: Performed by: INTERNAL MEDICINE

## 2025-08-04 PROCEDURE — 250N000012 HC RX MED GY IP 250 OP 636 PS 637: Performed by: INTERNAL MEDICINE

## 2025-08-04 RX ORDER — ACETAMINOPHEN 325 MG/1
650 TABLET ORAL EVERY 4 HOURS PRN
Status: DISCONTINUED | OUTPATIENT
Start: 2025-08-04 | End: 2025-08-16 | Stop reason: HOSPADM

## 2025-08-04 RX ORDER — CODEINE PHOSPHATE AND GUAIFENESIN 10; 100 MG/5ML; MG/5ML
5 SOLUTION ORAL EVERY 4 HOURS PRN
Status: DISCONTINUED | OUTPATIENT
Start: 2025-08-04 | End: 2025-08-16 | Stop reason: HOSPADM

## 2025-08-04 RX ORDER — ACETAMINOPHEN 650 MG/1
650 SUPPOSITORY RECTAL EVERY 4 HOURS PRN
Status: DISCONTINUED | OUTPATIENT
Start: 2025-08-04 | End: 2025-08-16 | Stop reason: HOSPADM

## 2025-08-04 RX ORDER — NITROGLYCERIN 0.4 MG/1
0.4 TABLET SUBLINGUAL EVERY 5 MIN PRN
Status: DISCONTINUED | OUTPATIENT
Start: 2025-08-04 | End: 2025-08-16 | Stop reason: HOSPADM

## 2025-08-04 RX ORDER — POLYETHYLENE GLYCOL 3350 17 G/17G
17 POWDER, FOR SOLUTION ORAL DAILY PRN
Status: DISCONTINUED | OUTPATIENT
Start: 2025-08-04 | End: 2025-08-16 | Stop reason: HOSPADM

## 2025-08-04 RX ORDER — ASPIRIN 81 MG/1
81 TABLET ORAL DAILY
Status: DISCONTINUED | OUTPATIENT
Start: 2025-08-05 | End: 2025-08-16 | Stop reason: HOSPADM

## 2025-08-04 RX ORDER — NALTREXONE HYDROCHLORIDE 50 MG/1
50 TABLET, FILM COATED ORAL DAILY
Status: DISCONTINUED | OUTPATIENT
Start: 2025-08-05 | End: 2025-08-16 | Stop reason: HOSPADM

## 2025-08-04 RX ORDER — MULTIPLE VITAMINS W/ MINERALS TAB 9MG-400MCG
1 TAB ORAL DAILY
Status: DISCONTINUED | OUTPATIENT
Start: 2025-08-05 | End: 2025-08-16 | Stop reason: HOSPADM

## 2025-08-04 RX ORDER — ENTECAVIR 0.5 MG/1
0.5 TABLET, FILM COATED ORAL DAILY
Status: DISCONTINUED | OUTPATIENT
Start: 2025-08-05 | End: 2025-08-16 | Stop reason: HOSPADM

## 2025-08-04 RX ORDER — ROSUVASTATIN CALCIUM 10 MG/1
20 TABLET, COATED ORAL DAILY
Status: DISCONTINUED | OUTPATIENT
Start: 2025-08-05 | End: 2025-08-16 | Stop reason: HOSPADM

## 2025-08-04 RX ORDER — PANTOPRAZOLE SODIUM 40 MG/1
40 TABLET, DELAYED RELEASE ORAL DAILY
Status: DISCONTINUED | OUTPATIENT
Start: 2025-08-05 | End: 2025-08-16 | Stop reason: HOSPADM

## 2025-08-04 RX ORDER — LATANOPROST 50 UG/ML
1 SOLUTION/ DROPS OPHTHALMIC AT BEDTIME
Status: DISCONTINUED | OUTPATIENT
Start: 2025-08-04 | End: 2025-08-16 | Stop reason: HOSPADM

## 2025-08-04 RX ORDER — FLUTICASONE FUROATE AND VILANTEROL 100; 25 UG/1; UG/1
1 POWDER RESPIRATORY (INHALATION) DAILY
Status: DISCONTINUED | OUTPATIENT
Start: 2025-08-05 | End: 2025-08-16 | Stop reason: HOSPADM

## 2025-08-04 RX ORDER — MYCOPHENOLATE MOFETIL 250 MG/1
750 CAPSULE ORAL
Status: DISCONTINUED | OUTPATIENT
Start: 2025-08-04 | End: 2025-08-16 | Stop reason: HOSPADM

## 2025-08-04 RX ORDER — HYDROXYZINE HYDROCHLORIDE 25 MG/1
25 TABLET, FILM COATED ORAL EVERY 6 HOURS PRN
Status: DISCONTINUED | OUTPATIENT
Start: 2025-08-04 | End: 2025-08-16 | Stop reason: HOSPADM

## 2025-08-04 RX ORDER — PREDNISONE 5 MG/1
5 TABLET ORAL DAILY
Status: DISCONTINUED | OUTPATIENT
Start: 2025-08-05 | End: 2025-08-16 | Stop reason: HOSPADM

## 2025-08-04 RX ADMIN — LATANOPROST 1 DROP: 50 SOLUTION OPHTHALMIC at 21:45

## 2025-08-04 RX ADMIN — MYCOPHENOLATE MOFETIL 750 MG: 250 CAPSULE ORAL at 18:29

## 2025-08-04 RX ADMIN — GUAIFENESIN AND CODEINE PHOSPHATE 5 ML: 100; 10 SOLUTION ORAL at 21:45

## 2025-08-04 ASSESSMENT — ACTIVITIES OF DAILY LIVING (ADL)
ADLS_ACUITY_SCORE: 39
ADLS_ACUITY_SCORE: 40
ADLS_ACUITY_SCORE: 39
ADLS_ACUITY_SCORE: 39
ADLS_ACUITY_SCORE: 41
ADLS_ACUITY_SCORE: 41
ADLS_ACUITY_SCORE: 57
ADLS_ACUITY_SCORE: 40
ADLS_ACUITY_SCORE: 39
ADLS_ACUITY_SCORE: 40
ADLS_ACUITY_SCORE: 41
ADLS_ACUITY_SCORE: 40
ADLS_ACUITY_SCORE: 39
ADLS_ACUITY_SCORE: 40
ADLS_ACUITY_SCORE: 41
ADLS_ACUITY_SCORE: 39
ADLS_ACUITY_SCORE: 41
ADLS_ACUITY_SCORE: 40

## 2025-08-04 NOTE — PLAN OF CARE
Goal Outcome Evaluation:    Plan of Care Reviewed With: patient    Overall Patient Progress: improving  Overall Patient Progress: improving    Outcome Evaluation: Assumed care from [id number]. A&Ox4, vitally stable on 1L NC - rhythm remains irregular. Cough remains frequent and dry, PRN Robitussin administered with relief. PIV intact, SL. Voiding spontaneously. Plan to discharge back to TCU 8/4. Continue with plan of care.     Aida Steward RN

## 2025-08-04 NOTE — PROGRESS NOTES
Care Management Discharge Note    Discharge Date: 08/04/2025       Discharge Disposition: Transitional Care    Discharge Services: Transportation Services    Discharge DME: Oxygen    [facility] [phone]    Is transportation arrangement complete? Yes    Private pay costs discussed: Not applicable    Does the patient's insurance plan have a 3 day qualifying hospital stay waiver?  No    PAS Confirmation Code:  N/A - Returning to [facility]. New PAS not needed  Patient/family educated on Medicare website which has current facility and service quality ratings: no (Agreeable to return to [facility])    Education Provided on the Discharge Plan: Yes  Persons Notified of Discharge Plans: Patient, [facility], provider, nursing staff,   Patient/Family in Agreement with the Plan: yes    Handoff Referral Completed: Yes, [facility] PCP: Internal handoff referral completed    Additional Information:  [facility] liaison Suzy David stated they are still waiting for [facility] insurance auth to comeback. She requested  schedule a tentative ride between [id number]. Suzy reported [facility] provider accepted the patient    TAMICA [facility] and scheduled wheelchair transportation with oxygen for today with a pickup window of [phone].    TAMICA reported the above information to primary care team.  TAMICA updated Suzy on the time of transportation    @1535 Suzy reported to  insurance auth came back and patient is good to return to [facility].    TAMICA updated the primary care team.  Bedside RN updated the patient.     [facility]  [address] 4th floor  [facility] [address]  P: [phone]  F: [phone]    Roger Hernandez, TIARAW, LSW  7C  ([phone])  [facility] Acute Care Management  Phone:  [phone]  Available on Vocera: 7C Med surg 2399 thru 2349 TAMICA

## 2025-08-04 NOTE — DISCHARGE SUMMARY
[facility]  Hospitalist Discharge Summary      Date of Admission:  7/30/2025  Date of Discharge:  8/4/2025  Discharging Provider: Eden Dobbs NP  Discharge Service: Hospitalist Service, GOLD TEAM 6    Discharge Diagnoses   Acute on Chronic Anemia   Acute on Chronic Hypoxic Respiratory Failure  Rhinovirus infection, resolved  Post-viral Cough  Possible COPD vs Restrictive Lung Disease   Chronic HFpEF, recent diagnosis   CAD s/p CABG  Elevated troponin, likely demand ischemia   FSGS s/p renal transplant x2 (1978, 1993)   Orthostatic hypotension, resolved  Physical deconditioning  Chronic HBV infection  Depression, anxiety  Insomnia  GERD  Glaucoma  Alcohol use disorder, in remission  H/o Provoked PE, off anticoagulation  Moderate malnutrition    Clinically Significant Risk Factors          Follow-ups Needed After Discharge   Follow-up Appointments       Follow Up and recommended labs and tests      Follow up with [facility] physician.  The following labs/tests are recommended: CBC and BMP twice weekly (M/Th).    [facility] clinic referral placed for cardiology follow up.  GI referral placed for small bowel capsule endoscopy.                Unresulted Labs Ordered in the Past 30 Days of this Admission       Date and Time Order Name Status Description    8/4/2025  9:45 AM Potassium (Limited Occurrences) In process         These results will be followed up by TCU staff, hospitalist pool     Discharge Disposition   Discharged to TCU  Condition at discharge: Stable    Hospital Course   Jerad Ross is a 65 year old male with history of FSGS s/p DDKT (1978, 1993), HTN, CAD s/p CABG, NSTEMI (2014), PE, COPD, chronic HBV, GERD, MDD, anxiety, AUD (in remission), chronic anemia, and recent admission 7/9/25 for AHRF due to rhinovirus and newly diagnosed HFpEF, discharged to [facility] on 7/21/25, who presents with acute anemia of unclear etiology. Admitted to medicine for  further evaluation and management. Patient underwent EGD per GI on 7/31 without clear source of bleeding. No evidence of bleeding elsewhere. Hemolysis was also ruled out. It remains unclear what caused acute drop in Hgb, although suspect secondary to self-limiting bleed vs marrow suppression from recent viral infection. Hgb remained stable during his stay. No further evaluation planned as inpatient. Patient is now medically stable for discharge. He remains physically deconditioned therefore will return to TCU for ongoing rehabilitation. See below for further details of hospital stay.     Acute on Chronic Anemia  Patient developed acute onset nausea and had a transient hypoxic event on 7/30. Work up revealed an acute drop in Hgb from 12.3 to 8.4 since labs on 7/24. No overt signs of bleeding were noted. CT AP negative - no free fluid to suggest intraperitoneal bleed. LDH, bilirubin, and haptoglobin normal. Peripheral smear negative for hemolysis. There was concern for GI bleed given episode of nausea. No hematemesis, melena, or hematochezia noted during hospital stay. GI was consulted. S/p EGD 7/31 which showed gastritis, otherwise no clear source of bleeding. Lower GI source not suspected. Hgb remained stable after initial drop. Suspect acute anemia related to self-limiting GI bleed vs marrow suppression from Rhinovirus infection. Retic index <2 indicating inadequate marrow response to anemia. Iron studies show probable anemia of chronic disease, although iron levels low. Anticipate slow improvement in coming weeks. Hgb 7.7 on day of discharge. Trend Hgb twice weekly while at TCU. GI recommends outpatient capsule endoscopy to further evaluate source (referral placed).     Elevated troponin, likely demand ischemia  Trop 36 - 36 - 35.  EKG negative for ischemic changes. No chest pain. ACS not suspected.      Acute on Chronic Hypoxic Respiratory Failure  Rhinovirus infection  Possible COPD   Hx COPD noted in chart but  PFTs appeared more c/w mild restrictive lung disease. Presented with dyspnea and hypoxia (transient O2 sat 80% on 2L) initially requiring BiPAP. CT Chest PE negative for PE, showed improved multifocal GGO throughout lungs. Hx recent rhinovirus infection and newly diagnosed HFpEF. TTE (7/14) revealed new LV diastolic dysfunction - clinically improved with IV Lasix. Discharged on O2 at 2 L/min. SpO2 consistently 100% at rest this admission. Weaned off supplemental O2 however intermittently desats with coughing and activity. Cough is non-productive and remains bothersome. Started on scheduled Tessalon and Robitussin AC with some relief.  No signs of decompensated HF this admission. Continue tessalon + robitussin AC PRN, breo, albuterol PRN. Wean O2 to maintain SpO2 > 90% as able. Consider repeat PFTs as OP.      HFpEF   CAD s/p CABG  NSTEMI (2014)   New diagnosis of HFpEF during recent admission. TTE (7/14) showed grade I diastolic dysfunction, EF 55-65%. CXR with pulmonary edema. Diuresed with IV Lasix. EDW ~152 lbs. Currently appears euvolemic. Continue Jardiance, ASA 81 daily, Metoprolol, and statin at discharge. Hold Imdur due to borderline low BP and recent bleeding. Needs cardiology follow-up (referral placed) and [facility] clinic referral (placed). Monitor BP closely, may need to hold Jardiance if orthostatic or hypotension persists     FSGS s/p renal transplant x2 (1978, 1993) - Renal function stable. No acute issues this admission. Transplant nephrology following. Continue PTA prednisone,  mg BID.       Stable chronic medical conditions:  Orthostatic hypotension:  Resolved.   Physical deconditioning: PT, OT consult  Chronic HBV: Continue PTA entecavir  Depression, anxiety: Continue fluoxetine, prn hydroxyzine  Insomnia: Melatonin prn  GERD: Continue PPI   Glaucoma: Continue PTA latanoprost drops  Alcohol use disorder, in remission: Continue naltrexone  H/o PE: Provoked after hip surgery in 8/2023. Not  maintained on AC. CTA Chest negative (see above).  H/o MITZI: Has visit with Dr. Bartholomew on 8/13  Moderate malnutrition: Low threshold for nutrition consult. Continue snacks/supplements with meals     Discharge planning:  - cardiology: referral placed for discharge, no appointment scheduled. [facility] referral also placed.     Consultations This Hospital Stay   CONSULT FOR INPATIENT VASCULAR ACCESS CARE  PHYSICAL THERAPY ADULT IP CONSULT  OCCUPATIONAL THERAPY ADULT IP CONSULT  NEPHROLOGY KIDNEY/PANCREAS TRANSPLANT ADULT IP CONSULT  GI LUMINAL ADULT IP CONSULT  CARE MANAGEMENT / SOCIAL WORK IP CONSULT  PHYSICAL THERAPY ADULT IP CONSULT  OCCUPATIONAL THERAPY ADULT IP CONSULT  NURSING TO CONSULT FOR VIRTUAL CARE IP    Code Status   No CPR- Do NOT Intubate    Time Spent on this Encounter   I, Eden Dobbs NP, personally saw the patient today and spent greater than 30 minutes discharging this patient.       Eden Dobbs DNP, [facility]  Internal Medicine ALISHA Hospitalist  [facility]  ______________________________________________________________________    Physical Exam   Vital Signs: Temp: 99  F (37.2  C) Temp src: Oral BP: 100/64 Pulse: 77   Resp: 18 SpO2: 98 % O2 Device: None (Room air) Oxygen Delivery: 1 LPM  Weight: 160 lbs 0 oz    GENERAL: Alert and awake. Answering questions appropriately. Oriented x 3. NAD. Pleasant and conversational   HEENT: Anicteric sclera. EOMI. Mucous membranes moist   CARDIOVASCULAR: RRR. S1, S2. No murmurs, rubs, or gallops.   RESPIRATORY: Effort normal on 1L NC. Clear to auscultation bilaterally, no rales, rhonchi or wheezes  GI: Abdomen soft, non-tender abdomen without rebound or guarding, normoactive bowel sounds present  MUSCULOSKELETAL: Moves all extremities.   EXTREMITIES: No peripheral edema. No calf asymmetry, erythema, or tenderness.   NEUROLOGICAL: No focal deficits. Moving all extremities symmetrically.   SKIN: Intact. Warm and dry. No jaundice. No rashes on  exposed skin        Primary Care Physician   Aston Perry    Discharge Orders      Adult GI  Referral - Procedure Only      Primary Care - Care Coordination Referral      Adult Cardiology Eval  Referral      General info for SNF    Length of Stay Estimate: Short Term Care: Estimated # of Days <30  Condition at Discharge: Stable  Level of care:skilled   Rehabilitation Potential: Good  Admission H&P remains valid and up-to-date: Yes  Recent Chemotherapy: N/A  Use Nursing Home Standing Orders: Yes     Mantoux instructions    Give two-step Mantoux (PPD) Per Facility Policy Yes     Follow Up and recommended labs and tests    Follow up with [facility] physician.  The following labs/tests are recommended: CBC and BMP twice weekly (M/Th).    [facility] clinic referral placed for cardiology follow up.  GI referral placed for small bowel capsule endoscopy.     Reason for your hospital stay    Admitted to [facility] for evaluation of acute anemia.  Extensive work up did not confirm the cause of acute drop in blood counts. It is possible this was secondary to self-limited bleeding episode versus bone marrow suppression from recent viral infection.  Blood counts have remained stable after initial drop.  You remain stable to transfer back to TCU for ongoing rehabilitation and further outpatient evaluation.     Intake and output    Every shift     Daily weights    Call Provider for weight gain of more than 2 pounds per day or 5 pounds per week.     Activity - Up with nursing assistance     No CPR- Do NOT Intubate     Physical Therapy Adult Consult    Evaluate and treat as clinically indicated.    Reason:  Physical deconditioning     Occupational Therapy Adult Consult    Evaluate and treat as clinically indicated.    Reason:  ADLs     Diet    Follow this diet upon discharge: Regular Diet Adult       Significant Results and Procedures   Most Recent 3 CBC's:  Recent Labs   Lab Test 08/04/25  0630 08/03/25  0702  08/02/25  0611   WBC 11.0 7.8 6.2   HGB 7.7* 7.4* 7.3*   MCV 96 93 93    320 291     Most Recent 3 BMP's:  Recent Labs   Lab Test 08/04/25  0630 08/03/25  1716 08/03/25  0702 08/02/25  1312 08/02/25  0611   *  --  135  --  138   POTASSIUM 3.4 3.7 3.0*   < > 3.2*   CHLORIDE 101  --  100  --  103   CO2 22  --  25  --  26   BUN 9.4  --  5.9*  --  13.1   CR 0.54*  --  0.60*  --  0.68   ANIONGAP 11  --  10  --  9   HEMA 8.3*  --  8.3*  --  8.5*   GLC 81  --  88  --  110*    < > = values in this interval not displayed.     Most Recent 2 LFT's:  Recent Labs   Lab Test 07/31/25  0536 07/30/25  1633   AST 23 26   ALT 12 11   ALKPHOS 45 48   BILITOTAL 0.5 0.5   ,   Results for orders placed or performed during the hospital encounter of 07/30/25   XR Chest 2 Views    Narrative    EXAM: XR CHEST 2 VIEWS  LOCATION: [facility]  DATE: 7/30/2025    INDICATION: sob  COMPARISON: 7/30/2025      Impression    IMPRESSION: Median sternotomy. Normal heart size. Patchy left basilar parenchymal opacity, suspicious for developing pneumonia on the lateral view. Clinical correlation and follow-up advised. No pneumothorax. Advanced degenerative changes in both   shoulders, left greater than right.   POC US ECHO LIMITED    Impression    Limited Bedside Cardiac Ultrasound, performed and interpreted by me.   Indication: Shortness of Breath.  Parasternal long axis, parasternal short axis, and apical 4 chamber views were acquired.   Image quality was satisfactory.    Findings:    Global left ventricular function appears intact.  Chambers do not appear dilated.  There is no evidence of free fluid within the pericardium.    IMPRESSION: Grossly normal left ventricular function and chamber size.  No pericardial effusion. B lines on right side.      POC US ECHO LIMITED    Impression    Cancel study. Duplicate order.   CT Chest PE Abdomen Pelvis w Contrast    Narrative    EXAMINATION: CT CHEST PE  ABDOMEN PELVIS W CONTRAST, 7/30/2025 7:59 PM    TECHNIQUE:  Helical CT images from the thoracic inlet through the  symphysis pubis were obtained with IV contrast in the early arterial  phase in the chest and portal venous phase in the abdomen and pelvis.  Contrast dose: 96 mL Isovue-370    COMPARISON: CT 7/19/2025, MR 7/13/2015    HISTORY: Hgb drop, hypoxia. ?PNA on CXR.    FINDINGS:  CHEST:  THYROID: Thyroid is unremarkable.    LUNGS/PLEURA: Improved multifocal groundglass attenuation throughout  the lungs. No mass or consolidation. No pleural effusion or  pneumothorax. The airway is patent.    MEDIASTINUM: Heart size is within normal limits. No pericardial  effusion. No suspicious mediastinal lymphadenopathy.  The esophagus is  unremarkable.    CHEST WALL: Sternotomy wires in place. No suspicious axillary  lymphadenopathy.    VESSELS: No evidence for acute pulmonary embolism. No evidence for an  heart strain or significant reflux into the IVC.    ABDOMEN/PELVIS:  LIVER: Subcentimeter hypodensity in the hepatic segment 7, too small  to characterize. Widening of the fissures.    BILIARY: Normal appearance of the gallbladder. No intra- or  extrahepatic biliary dilation.    PANCREAS: No focal pancreatic mass or ductal dilation.    SPLEEN: Splenectomy with residual tissue.    ADRENAL GLANDS: Within normal limits.    URINARY TRACT: Near complete atrophy of the bilateral kidneys. Post  interval changes of left lower quadrant kidney transplant. There is  also a failed heavily calcified right lower quadrant renal transplant.  No hydronephrosis or suspicious bladder wall thickening.     REPRODUCTIVE ORGANS: Limited due to streak artifact from hip  arthroplasties.    STOMACH: Within normal limits.    BOWEL: Colonic diverticulosis, most marked in the descending colon  region. Normal caliber of the small and large bowel.      PERITONEUM/FLUID: No focal fluid collection or evidence of free fluid  or free air.    VESSELS: No  aneurysmal dilatation of the abdominal aorta.  The portal,  splenic, and superior mesenteric veins are patent.  The origins of the  celiac and superior mesenteric arteries are patent.    LYMPH NODES: No lymphadenopathy.    BONES/SOFT TISSUES: No aggressive osseous lesions. Severe shoulder  degenerative changes. Total hip arthroplasties. Fatty atrophy of the  right psoas muscles. Calcification in the right paraspinal region may  be related to previous hematoma in this region (13/197).      Impression    IMPRESSION:  1. No acute pulmonary embolism. Improved multifocal groundglass  opacities throughout the lungs.   2. No acute pathology in the abdomen or pelvis. Incidentals as  described above.    I have personally reviewed the examination and initial interpretation  and I agree with the findings.    MONSTER ORDAZ MD         SYSTEM ID:  [id number]     *Note: Due to a large number of results and/or encounters for the requested time period, some results have not been displayed. A complete set of results can be found in Results Review.       Discharge Medications      Review of your medicines        PAUSE taking these medications        Dose / Directions   isosorbide mononitrate 60 MG 24 hr tablet  Wait to take this until your doctor or other care provider tells you to start again.  Commonly known as: IMDUR  Used for: S/P CABG (coronary artery bypass graft)      Dose: 60 mg  Take 1 tablet (60 mg) by mouth daily.  Quantity: 90 tablet  Refills: 2            START taking        Dose / Directions   benzonatate 100 MG capsule  Commonly known as: TESSALON  Used for: Shortness of breath      Dose: 100 mg  Take 1 capsule (100 mg) by mouth 3 times daily.  Quantity: 21 capsule  Refills: 0     guaiFENesin-codeine 100-10 MG/5ML solution  Commonly known as: ROBITUSSIN AC  Used for: Shortness of breath      Dose: 5 mL  Take 5 mLs by mouth every 4 hours as needed for cough.  Refills: 0            CHANGE how you take these medications         Dose / Directions   acetaminophen 500 MG tablet  Commonly known as: TYLENOL  This may have changed: Another medication with the same name was removed. Continue taking this medication, and follow the directions you see here.      Dose: 500-1,000 mg  Take 500-1,000 mg by mouth every 6 hours as needed for mild pain or fever.  Refills: 0            CONTINUE these medicines which have NOT CHANGED        Dose / Directions   albuterol 108 (90 Base) MCG/ACT inhaler  Commonly known as: ProAir HFA  Used for: Wheezing      Dose: 2 puff  Inhale 2 puffs into the lungs every 6 hours as needed for shortness of breath / dyspnea or wheezing  Quantity: 1 g  Refills: 11     aspirin 81 MG EC tablet  Used for: Multiple subsegmental pulmonary emboli without acute cor pulmonale (H)      Dose: 81 mg  Take 1 tablet (81 mg) by mouth daily  Refills: 0     CALCIUM CITRATE + D PO      Dose: 1 tablet  Take 1 tablet by mouth daily.  Refills: 0     clindamycin 150 MG capsule  Commonly known as: CLEOCIN      TAKE 2 CAPSULES BY MOUTH 1 HOUR PRIOR TO DENTAL APPOINTMENT. TAKE 1 CAPSULE BY MOUTH EVERY 6 HOURS AFTER APPOINTMENT  Refills: 0     empagliflozin 10 MG Tabs tablet  Commonly known as: JARDIANCE  Used for: Heart failure with preserved ejection fraction, NYHA class I (H)      Dose: 10 mg  Take 1 tablet (10 mg) by mouth daily.  Quantity: 90 tablet  Refills: 1     entecavir 0.5 MG tablet  Commonly known as: BARACLUDE  Used for: Chronic hepatitis B (H), Chronic viral hepatitis B without delta agent and without coma (H)      Dose: 0.5 mg  Take 1 tablet (0.5 mg) by mouth daily.  Quantity: 90 tablet  Refills: 3     * FLUoxetine 20 MG capsule  Commonly known as: PROzac  Used for: Recurrent major depressive disorder, in partial remission      Dose: 20 mg  Take 1 capsule (20 mg) by mouth daily. With 40mg for a total daily dose of 60mg  Quantity: 90 capsule  Refills: 3     * FLUoxetine 40 MG capsule  Commonly known as: PROzac  Used for: Recurrent  major depressive disorder, in partial remission      Dose: 40 mg  Take 1 capsule (40 mg) by mouth daily.  Quantity: 90 capsule  Refills: 3     fluticasone-salmeterol 250-50 MCG/ACT inhaler  Commonly known as: ADVAIR  Used for: Dyspnea on exertion, Wheezing      Dose: 1 puff  Inhale 1 puff into the lungs 2 times daily  Quantity: 180 each  Refills: 3     furosemide 20 MG tablet  Commonly known as: LASIX  Used for: Dyspnea on exertion      Dose: 20 mg  Take 1 tablet (20 mg) by mouth daily as needed (fluid retention)  Quantity: 90 tablet  Refills: 1     latanoprost 0.005 % ophthalmic solution  Commonly known as: XALATAN  Used for: Borderline glaucoma with ocular hypertension, bilateral      Dose: 1 drop  Place 1 drop into both eyes At Bedtime  Quantity: 2.5 mL  Refills: 11     melatonin 5 MG tablet      Dose: 5 mg  Take 1 tablet (5 mg) by mouth nightly as needed for sleep.  Refills: 0     metoprolol succinate ER 25 MG 24 hr tablet  Commonly known as: TOPROL XL  Used for: CAD -- S/P CABG x 3 in 2020      Dose: 12.5 mg  Take 0.5 tablets (12.5 mg) by mouth daily.  Quantity: 45 tablet  Refills: 2     mycophenolate 250 MG capsule  Commonly known as: GENERIC EQUIVALENT      Dose: 750 mg  Take 3 capsules (750 mg) by mouth.  Refills: 0     naltrexone 50 MG tablet  Commonly known as: DEPADE/REVIA  Used for: Alcohol use disorder      Dose: 50 mg  Take 1 tablet (50 mg) by mouth daily.  Quantity: 30 tablet  Refills: 2     nitroGLYcerin 0.4 MG sublingual tablet  Commonly known as: NITROSTAT  Used for: Coronary arteriosclerosis due to lipid rich plaque      Dose: 0.4 mg  Place 1 tablet (0.4 mg) under the tongue every 5 minutes as needed for chest pain  Quantity: 20 tablet  Refills: 3     OMEGA 3 PO      Dose: 1 capsule  Take 1 capsule by mouth daily Dose unknown  Refills: 0     One Daily Multivitamin/Iron Tabs      Dose: 1 tablet  Take 1 tablet by mouth daily  Refills: 0     pantoprazole 40 MG EC tablet  Commonly known as:  PROTONIX  Used for: Gastroesophageal reflux disease without esophagitis      Dose: 40 mg  TAKE 1 TABLET BY MOUTH EVERY DAY  Quantity: 90 tablet  Refills: 3     polyethylene glycol 17 g packet  Commonly known as: MIRALAX      Dose: 17 g  Take 17 g by mouth daily as needed  Refills: 0     predniSONE 5 MG tablet  Commonly known as: DELTASONE  Used for: Kidney transplanted      Dose: 5 mg  Take 1 tablet (5 mg) by mouth daily.  Quantity: 90 tablet  Refills: 3     rosuvastatin 20 MG tablet  Commonly known as: CRESTOR  Used for: Pure hypercholesterolemia      Dose: 20 mg  Take 1 tablet (20 mg) by mouth daily.  Quantity: 90 tablet  Refills: 3     sodium chloride 0.65 % nasal spray  Commonly known as: OCEAN      Dose: 1 spray  Spray 1 spray into both nostrils every hour as needed for congestion.  Refills: 0     white petrolatum gel  Used for: On supplemental oxygen by nasal cannula, Skin irritation      Apply topically daily as needed for dry skin (Apply to nose while using the nasal cannula to prevent nose bleeds).  Quantity: 113 g  Refills: 3           * This list has 2 medication(s) that are the same as other medications prescribed for you. Read the directions carefully, and ask your doctor or other care provider to review them with you.                STOP taking      budesonide 90 MCG/ACT inhaler  Commonly known as: PULMICORT FLEXHALER        Diabetic Tussin Max St  MG/5ML Liqd  Generic drug: Dextromethorphan-guaiFENesin        hydrOXYzine HCl 10 MG tablet  Commonly known as: ATARAX        hydrOXYzine HCl 25 MG tablet  Commonly known as: ATARAX                  Where to get your medicines        Information about where to get these medications is not yet available    Ask your nurse or doctor about these medications  guaiFENesin-codeine 100-10 MG/5ML solution       Allergies   Allergies   Allergen Reactions    Penicillins Shortness Of Breath and Hives     Occurred in childhood     Keflex [Cephalexin Hcl] Unknown      Patient could not recall reaction to Keflex  Per chart, has tolerated cefazolin (2023)    Sulfa Antibiotics Rash    Tetracycline Unknown     Patient could not recall reaction, childhood reaction

## 2025-08-04 NOTE — PROGRESS NOTES
[facility]  Transplant Nephrology Progress Note  Date of Admission:  7/30/2025  Today's Date: 08/04/2025  Requesting physician: Ron Cheung MD    Recommendations:   - continue on current immunosuppression   - no acute indications for dialysis     Assessment & Plan   # DDKT: Stable   - Baseline Creatinine: ~ 0.7-0.9   - Proteinuria: Normal (<0.2 grams)   - DSA Hx: Not checked recently due to time from transplant   - Last cPRA: unknown%   - BK Viremia: Not checked recently due to time from transplant   - Kidney Tx Biopsy Hx: No history of acute rejection.    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     # Immunosuppression: Mycophenolate mofetil (dose 750 mg every 12 hours) and Prednisone (dose 5 mg daily)   - Induction with Recent Transplant:  Not known due to time from transplant   - Continue with intensive monitoring of immunosuppression for efficacy and toxicity.   - Historical Changes in Immunosuppression: None   - Changes: Not at this time    # Infection Prevention:   Last CD4 Level: Not checked  - PJP: None      - CMV IgG Ab High Risk Discordance (D+/R-) at time of transplant: Unknown  Present CMV Serostatus: Negative  - EBV IgG Ab High Risk Discordance (D+/R-) at time of transplant: Unknown  Present EBV Serostatus: Unknown    # Hypertension: Hypotensive;  Goal BP: > 100, but < 130 systolic   - Changes: Not at this time    # Diabetes: Controlled (HbA1c <7%)  Last HbA1c: 6% last in chart is in 2022    # Anemia in Chronic Renal Disease: Hgb: Trend down  in setting of probable GI bleeding    CAROL ANN: No   - Iron studies: Low iron saturation, will order iron studies    # Mineral Bone Disorder:    - Vitamin D; level: Not checked recently, but was normal last check        On supplement: No  - Calcium; level: Normal        On supplement: No    # Electrolytes:  - Potassium; level: Normal        On supplement: No  - Bicarbonate; level: Normal        On  supplement: No  - Sodium; level: Normal    # Acute hypoxic respiratory failure:  # Pulmonary edema, resolved.   # Viral versus Atypical Pneumonia: + Rhino/enterovirus. CT PE negative. Gram + Bcx on 07/09, likely contaminant. Repeat blood cultures 07/10. Sputum culture 7/12 likely contaminated, per report. Repeat pending. CXR 7/13 with pulmonary edema. Started on ceftriaxone and currently on prednisone 40 mg daily and doxycycline.               - CXR 7/16 with pulmonary edema and bilateral pleural effusions.   - Management per primary team. Pulmonology following.     # Acute decompensated heart failure: echo in 2023 with no evidence of HF. BNP 1800 on 7/13. PTA on 20 mg lasix PO daily.               - Echo 7/14; EF 55-65% with grade I diastolic dysfunction. No pericardial effusion.              - Management per primary team.     # Other Significant PMH:   - CAD, s/p CABG: Last cardiac stress test was abnormal in 6/2023 (but unchanged from prior testing in 2022).  Coronary angiogram 9/2021 that showed some possible obstructive disease in the 1st diagonal, but unable to stent it.  Bypass grafts were open.  Plan is for medical management. Last cardiac echo (8/2023) showed LVEF ~ 60-65%. Normal right ventricular systolic function. Normal diastolic dysfunction.               - Provoked PE (8/2023): after right hip revision surgery. Completed AC.   - Chronic Hepatitis B: Stable on entecavir. Follows here with hepatology.   - Asthma: Some increase in shortness of breath, but felt more cardiac in origin.  Patient is stable on inhalers.   - GERD: Asymptomatic on pantoprazole, but with h/o ulcer, will continue on medication.   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    Harriet Hyde MD  Transplant Nephrology  Contact information via [facility] Paging/Directory    Interval History  Pt on RA, but had bout of cough while in the room, per pt improving with cough medicine.  No chest pain or SOB. No N/V/D noted today.    Review of Systems   4 point ROS was obtained and negative except as noted in the Interval History.    MEDICATIONS:  Current Facility-Administered Medications   Medication Dose Route Frequency Provider Last Rate Last Admin    aspirin EC tablet 81 mg  81 mg Oral Daily Abby Goddard PA-C   81 mg at 08/04/25 0828    benzonatate (TESSALON) capsule 100 mg  100 mg Oral TID Bebeto Jimenez PA-C   100 mg at 08/04/25 1354    empagliflozin (JARDIANCE) tablet 10 mg  10 mg Oral Daily Abby Goddard PA-C   10 mg at 08/04/25 0828    entecavir (BARACLUDE) tablet 0.5 mg  0.5 mg Oral Daily Abby Goddard PA-C   0.5 mg at 08/04/25 0829    FLUoxetine (PROzac) capsule 60 mg  60 mg Oral Daily Abby Goddard PA-C   60 mg at 08/04/25 0829    fluticasone-vilanterol (BREO ELLIPTA) 100-25 MCG/ACT inhaler 1 puff  1 puff Inhalation Daily Abby Goddard PA-C   1 puff at 08/04/25 0826    [Held by provider] isosorbide mononitrate (IMDUR) 24 hr tablet 60 mg  60 mg Oral Daily Abby Goddard PA-C        latanoprost (XALATAN) 0.005 % ophthalmic solution 1 drop  1 drop Both Eyes At Bedtime Abby Goddard PA-C   1 drop at 08/03/25 2108    metoprolol succinate ER (TOPROL-XL) 24 hr half-tab 12.5 mg  12.5 mg Oral Daily Bebeto Jimenez PA-C   12.5 mg at 08/04/25 0827    multivitamin w/minerals (THERA-VIT-M) tablet 1 tablet  1 tablet Oral Daily Abby Goddard PA-C   1 tablet at 08/04/25 0829    mycophenolate (GENERIC EQUIVALENT) capsule 750 mg  750 mg Oral BID IS bAby Goddard PA-C   750 mg at 08/04/25 0829    naltrexone (DEPADE/REVIA) tablet 50 mg  50 mg Oral Daily Abby Goddard PA-C   50 mg at 08/04/25 0828  "   pantoprazole (PROTONIX) EC tablet 40 mg  40 mg Oral BID AC Abby Goddard PA-C   40 mg at 25 1542    predniSONE (DELTASONE) tablet 5 mg  5 mg Oral Daily Abby Goddard PA-C   5 mg at 25 0828    rosuvastatin (CRESTOR) tablet 20 mg  20 mg Oral Daily Abby Goddard PA-C   20 mg at 25 0829    sodium chloride (PF) 0.9% PF flush 3 mL  3 mL Intracatheter Q8H NE Abby Goddard PA-C   3 mL at 25 1354     Current Facility-Administered Medications   Medication Dose Route Frequency Provider Last Rate Last Admin       Physical Exam   Temp  Av.8  F (36.6  C)  Min: 97.3  F (36.3  C)  Max: 99.9  F (37.7  C)      Pulse  Av.9  Min: 53  Max: 98 Resp  Av.3  Min: 16  Max: 20  SpO2  Av.7 %  Min: 77 %  Max: 100 %     /64 (BP Location: Left arm)   Pulse 77   Temp 99  F (37.2  C) (Oral)   Resp 18   Ht 1.727 m (5' 8\")   Wt 72.6 kg (160 lb)   SpO2 98%   BMI 24.33 kg/m     Date 25 07 - 25 0659   Shift 0305-7143 7807-1615 9764-9854 24 Hour Total   INTAKE   Shift Total       OUTPUT   Urine 400   400   Shift Total 400   400   Weight (kg)          Admit       GENERAL APPEARANCE: sleepy but easily arousable and no distress  EYES: eyes grossly normal to inspection  HENT: normal cephalic/atraumatic  RESP: lungs clear to auscultation  CV: regular rhythm, normal rate  EDEMA: trace LE edema bilaterally  ABDOMEN: soft, nondistended, nontender  MS: extremities normal - no gross deformities noted  NEURO: mentation intact and speech normal  PSYCH: mentation appears normal and affect normal/bright  TX KIDNEY: normal    Data   All labs reviewed by me.  CMP  Recent Labs   Lab 25  1502 25  0630 25  1716 25  0702 25  1312 25  0611 25  0749 25  0730 25  0610 25  0536 25  1633 25  1224   NA  --  134*  --  135  --  138  --   --  134* 136 135 136   POTASSIUM 3.6 3.4 3.7 3.0*   < > 3.2*  --   --  3.5 3.7 4.5 4.2 "   CHLORIDE  --  101  --  100  --  103  --   --  98 101 99 100   CO2  --  22  --  25  --  26  --   --  21* 25 28 25   ANIONGAP  --  11  --  10  --  9  --   --  15 10 8 11   GLC  --  81  --  88  --  110* 71   < > 57* 73 107* 137*   BUN  --  9.4  --  5.9*  --  13.1  --   --  12.2 15.8 20.9 23.4*   CR  --  0.54*  --  0.60*  --  0.68  --   --  0.69 0.74 0.71 0.70   GFRESTIMATED  --  >90  --  >90  --  >90  --   --  >90 >90 >90 >90   HEMA  --  8.3*  --  8.3*  --  8.5*  --   --  8.6* 8.8 8.6* 8.2*   MAG  --   --   --   --   --   --   --   --   --   --   --  1.8   PROTTOTAL  --   --   --   --   --   --   --   --   --  5.2* 5.3* 5.1*   ALBUMIN  --   --   --   --   --   --   --   --   --  2.9* 3.0* 2.8*   BILITOTAL  --   --   --   --   --   --   --   --   --  0.5 0.5 0.5   ALKPHOS  --   --   --   --   --   --   --   --   --  45 48 50   AST  --   --   --   --   --   --   --   --   --  23 26 21   ALT  --   --   --   --   --   --   --   --   --  12 11 14    < > = values in this interval not displayed.     CBC  Recent Labs   Lab 08/04/25  0630 08/03/25  0702 08/02/25  0611 08/01/25  0610   HGB 7.7* 7.4* 7.3* 7.4*   WBC 11.0 7.8 6.2 6.6   RBC 2.73* 2.55* 2.57* 2.58*   HCT 26.2* 23.7* 23.9* 24.1*   MCV 96 93 93 93   MCH 28.2 29.0 28.4 28.7   MCHC 29.4* 31.2* 30.5* 30.7*   RDW 16.9* 16.7* 17.0* 17.0*    320 291 283     INR  Recent Labs   Lab 07/31/25  0536 07/30/25  1633 07/30/25  1404   INR 1.12 1.09 1.10   PTT  --  33  --      ABG  Recent Labs   Lab 07/30/25  1731 07/30/25  1224   O2PER 29 21      Urine Studies  Recent Labs   Lab Test 07/09/25  1346 08/30/23  1856 08/20/23  1245 08/11/23  1928 06/03/20  1307   COLOR Yellow Yellow Light Yellow Light Yellow Yellow   APPEARANCE Slightly Cloudy* Clear Clear Clear Clear   URINEGLC Negative Negative Negative Negative Negative   URINEBILI Negative Negative Negative Negative Negative   URINEKETONE Trace* 10* 60* 10* Negative   SG 1.015 1.021 1.015 1.009 1.008   UBLD Negative Negative  Negative Negative Negative   URINEPH 6.0 5.0 6.0 6.0 6.5   PROTEIN 20* 10* 20* 10* Negative   UROBILINOGEN  --   --   --   --  <2.0 E.U./dL   NITRITE Negative Negative Negative Negative Negative   LEUKEST Negative Negative Negative Negative Negative   RBCU 1 <1 <1 0  --    WBCU 7* 3 1 3  --      Recent Labs   Lab Test 10/28/20  0902 10/01/19  0942 04/02/19  0917 10/23/18  1122 06/21/18  1209   [facility] 0.23* 0.26* 0.23* 0.21* 0.12     PTH  No lab results found.  Iron Studies  Recent Labs   Lab Test 07/31/25  0536 10/02/23  0943   IRON 23* 30*   * 286   IRONSAT 10* 10*    50       IMAGING:  All imaging studies reviewed by me.

## 2025-08-04 NOTE — PLAN OF CARE
"Goal Outcome Evaluation: 0700-discharge       Plan of Care Reviewed With: patient    Overall Patient Progress: no changeOverall Patient Progress: no change     No acute changes this morning. A&OX4. VSS On 1LNC.dry cough, PRN cough syrup utilized. 1 BM today. Utilizing urinal, voiding adequately. K replaced. Discharged to tcu.     Discharge to: [facility]  Transportation: [facility]-wheelchair  Time:1600  Prescriptions: n/a  Belongings:personal clothing, phone, shoes, notebook   -if applicable return home meds from inpatient pharmacy:  Access: piv removed  Care plan and education discontinued: yes  Paperwork:  packet printed and sent    /64 (BP Location: Left arm)   Pulse 77   Temp 99  F (37.2  C) (Oral)   Resp 18   Ht 1.727 m (5' 8\")   Wt 72.6 kg (160 lb)   SpO2 98%   BMI 24.33 kg/m         "

## 2025-08-05 ENCOUNTER — APPOINTMENT (OUTPATIENT)
Dept: PHYSICAL THERAPY | Facility: SKILLED NURSING FACILITY | Age: 65
DRG: 811 | End: 2025-08-05
Attending: INTERNAL MEDICINE
Payer: COMMERCIAL

## 2025-08-05 ENCOUNTER — PATIENT OUTREACH (OUTPATIENT)
Dept: CARE COORDINATION | Facility: CLINIC | Age: 65
End: 2025-08-05

## 2025-08-05 ENCOUNTER — APPOINTMENT (OUTPATIENT)
Dept: OCCUPATIONAL THERAPY | Facility: SKILLED NURSING FACILITY | Age: 65
DRG: 811 | End: 2025-08-05
Attending: INTERNAL MEDICINE
Payer: COMMERCIAL

## 2025-08-05 DIAGNOSIS — K31.A0 INTESTINAL METAPLASIA OF STOMACH: Primary | ICD-10-CM

## 2025-08-05 LAB
ANION GAP SERPL CALCULATED.3IONS-SCNC: 13 MMOL/L (ref 7–15)
BUN SERPL-MCNC: 11.4 MG/DL (ref 8–23)
CALCIUM SERPL-MCNC: 8.2 MG/DL (ref 8.8–10.4)
CHLORIDE SERPL-SCNC: 102 MMOL/L (ref 98–107)
CREAT SERPL-MCNC: 0.68 MG/DL (ref 0.67–1.17)
EGFRCR SERPLBLD CKD-EPI 2021: >90 ML/MIN/1.73M2
ERYTHROCYTE [DISTWIDTH] IN BLOOD BY AUTOMATED COUNT: 16.7 % (ref 10–15)
GLUCOSE SERPL-MCNC: 91 MG/DL (ref 70–99)
HCO3 SERPL-SCNC: 23 MMOL/L (ref 22–29)
HCT VFR BLD AUTO: 25.3 % (ref 40–53)
HGB BLD-MCNC: 7.8 G/DL (ref 13.3–17.7)
MCH RBC QN AUTO: 28.3 PG (ref 26.5–33)
MCHC RBC AUTO-ENTMCNC: 30.8 G/DL (ref 31.5–36.5)
MCV RBC AUTO: 92 FL (ref 78–100)
PLATELET # BLD AUTO: 402 10E3/UL (ref 150–450)
POTASSIUM SERPL-SCNC: 3.6 MMOL/L (ref 3.4–5.3)
RBC # BLD AUTO: 2.76 10E6/UL (ref 4.4–5.9)
SODIUM SERPL-SCNC: 138 MMOL/L (ref 135–145)
WBC # BLD AUTO: 9.4 10E3/UL (ref 4–11)

## 2025-08-05 PROCEDURE — 99305 1ST NF CARE MODERATE MDM 35: CPT | Performed by: INTERNAL MEDICINE

## 2025-08-05 PROCEDURE — 80048 BASIC METABOLIC PNL TOTAL CA: CPT | Performed by: INTERNAL MEDICINE

## 2025-08-05 PROCEDURE — 97110 THERAPEUTIC EXERCISES: CPT | Mod: GP

## 2025-08-05 PROCEDURE — 36415 COLL VENOUS BLD VENIPUNCTURE: CPT | Performed by: INTERNAL MEDICINE

## 2025-08-05 PROCEDURE — 97161 PT EVAL LOW COMPLEX 20 MIN: CPT | Mod: GP

## 2025-08-05 PROCEDURE — 250N000009 HC RX 250: Performed by: INTERNAL MEDICINE

## 2025-08-05 PROCEDURE — 97165 OT EVAL LOW COMPLEX 30 MIN: CPT | Mod: GO

## 2025-08-05 PROCEDURE — 250N000013 HC RX MED GY IP 250 OP 250 PS 637: Performed by: INTERNAL MEDICINE

## 2025-08-05 PROCEDURE — 97535 SELF CARE MNGMENT TRAINING: CPT | Mod: GO

## 2025-08-05 PROCEDURE — 120N000009 HC R&B SNF

## 2025-08-05 PROCEDURE — 97530 THERAPEUTIC ACTIVITIES: CPT | Mod: GP

## 2025-08-05 PROCEDURE — 250N000012 HC RX MED GY IP 250 OP 636 PS 637: Performed by: INTERNAL MEDICINE

## 2025-08-05 PROCEDURE — 85014 HEMATOCRIT: CPT | Performed by: INTERNAL MEDICINE

## 2025-08-05 RX ORDER — IPRATROPIUM BROMIDE AND ALBUTEROL SULFATE 2.5; .5 MG/3ML; MG/3ML
3 SOLUTION RESPIRATORY (INHALATION) EVERY 4 HOURS PRN
Status: DISCONTINUED | OUTPATIENT
Start: 2025-08-05 | End: 2025-08-16 | Stop reason: HOSPADM

## 2025-08-05 RX ORDER — BENZONATATE 100 MG/1
200 CAPSULE ORAL 3 TIMES DAILY
Status: DISCONTINUED | OUTPATIENT
Start: 2025-08-05 | End: 2025-08-16 | Stop reason: HOSPADM

## 2025-08-05 RX ADMIN — Medication 12.5 MG: at 08:16

## 2025-08-05 RX ADMIN — MYCOPHENOLATE MOFETIL 750 MG: 250 CAPSULE ORAL at 08:16

## 2025-08-05 RX ADMIN — FLUOXETINE HYDROCHLORIDE 60 MG: 40 CAPSULE ORAL at 08:15

## 2025-08-05 RX ADMIN — FLUTICASONE FUROATE AND VILANTEROL TRIFENATATE 1 PUFF: 100; 25 POWDER RESPIRATORY (INHALATION) at 08:18

## 2025-08-05 RX ADMIN — GUAIFENESIN AND CODEINE PHOSPHATE 5 ML: 100; 10 SOLUTION ORAL at 11:51

## 2025-08-05 RX ADMIN — LATANOPROST 1 DROP: 50 SOLUTION OPHTHALMIC at 21:42

## 2025-08-05 RX ADMIN — NALTREXONE HYDROCHLORIDE 50 MG: 50 TABLET, FILM COATED ORAL at 08:16

## 2025-08-05 RX ADMIN — BENZONATATE 200 MG: 100 CAPSULE ORAL at 14:23

## 2025-08-05 RX ADMIN — POLYETHYLENE GLYCOL 3350 17 G: 17 POWDER, FOR SOLUTION ORAL at 12:01

## 2025-08-05 RX ADMIN — PANTOPRAZOLE SODIUM 40 MG: 40 TABLET, DELAYED RELEASE ORAL at 08:16

## 2025-08-05 RX ADMIN — MYCOPHENOLATE MOFETIL 750 MG: 250 CAPSULE ORAL at 18:33

## 2025-08-05 RX ADMIN — ASPIRIN 81 MG: 81 TABLET, DELAYED RELEASE ORAL at 08:16

## 2025-08-05 RX ADMIN — ENTECAVIR 0.5 MG: 0.5 TABLET ORAL at 08:16

## 2025-08-05 RX ADMIN — BENZONATATE 200 MG: 100 CAPSULE ORAL at 21:42

## 2025-08-05 RX ADMIN — ROSUVASTATIN CALCIUM 20 MG: 10 TABLET, FILM COATED ORAL at 08:15

## 2025-08-05 RX ADMIN — ACETAMINOPHEN 650 MG: 325 TABLET ORAL at 16:35

## 2025-08-05 RX ADMIN — TUBERCULIN PURIFIED PROTEIN DERIVATIVE 5 UNITS: 5 INJECTION, SOLUTION INTRADERMAL at 11:52

## 2025-08-05 RX ADMIN — EMPAGLIFLOZIN 10 MG: 10 TABLET, FILM COATED ORAL at 08:16

## 2025-08-05 RX ADMIN — GUAIFENESIN AND CODEINE PHOSPHATE 5 ML: 100; 10 SOLUTION ORAL at 16:31

## 2025-08-05 RX ADMIN — Medication 1 TABLET: at 08:15

## 2025-08-05 RX ADMIN — PREDNISONE 5 MG: 5 TABLET ORAL at 08:16

## 2025-08-05 ASSESSMENT — ACTIVITIES OF DAILY LIVING (ADL)
ADLS_ACUITY_SCORE: 41

## 2025-08-05 NOTE — PLAN OF CARE
Physical Therapy Discharge Summary    Reason for therapy discharge:    Discharged to transitional care facility.    Progress towards therapy goal(s). See goals on Care Plan in [facility] electronic health record for goal details.  Goals partially met.  Barriers to achieving goals:   discharge from facility.    Therapy recommendation(s):    Continued therapy is recommended.  Rationale/Recommendations:  to progress strength and activity tolerance for greater safety with IND mobility.

## 2025-08-06 ENCOUNTER — APPOINTMENT (OUTPATIENT)
Dept: PHYSICAL THERAPY | Facility: SKILLED NURSING FACILITY | Age: 65
DRG: 811 | End: 2025-08-06
Attending: INTERNAL MEDICINE
Payer: COMMERCIAL

## 2025-08-06 ENCOUNTER — APPOINTMENT (OUTPATIENT)
Dept: OCCUPATIONAL THERAPY | Facility: SKILLED NURSING FACILITY | Age: 65
DRG: 811 | End: 2025-08-06
Attending: INTERNAL MEDICINE
Payer: COMMERCIAL

## 2025-08-06 DIAGNOSIS — M25.559 HIP PAIN: Primary | ICD-10-CM

## 2025-08-06 LAB
FLUAV RNA SPEC QL NAA+PROBE: NEGATIVE
FLUBV RNA RESP QL NAA+PROBE: NEGATIVE
RSV RNA SPEC NAA+PROBE: NEGATIVE
SARS-COV-2 RNA RESP QL NAA+PROBE: NEGATIVE

## 2025-08-06 PROCEDURE — 250N000013 HC RX MED GY IP 250 OP 250 PS 637: Performed by: INTERNAL MEDICINE

## 2025-08-06 PROCEDURE — 120N000009 HC R&B SNF

## 2025-08-06 PROCEDURE — 97535 SELF CARE MNGMENT TRAINING: CPT | Mod: GO

## 2025-08-06 PROCEDURE — 97530 THERAPEUTIC ACTIVITIES: CPT | Mod: GP

## 2025-08-06 PROCEDURE — 250N000012 HC RX MED GY IP 250 OP 636 PS 637: Performed by: INTERNAL MEDICINE

## 2025-08-06 PROCEDURE — 87637 SARSCOV2&INF A&B&RSV AMP PRB: CPT | Performed by: INTERNAL MEDICINE

## 2025-08-06 PROCEDURE — 97110 THERAPEUTIC EXERCISES: CPT | Mod: GP

## 2025-08-06 RX ADMIN — Medication 12.5 MG: at 09:13

## 2025-08-06 RX ADMIN — FLUOXETINE HYDROCHLORIDE 60 MG: 40 CAPSULE ORAL at 09:14

## 2025-08-06 RX ADMIN — GUAIFENESIN AND CODEINE PHOSPHATE 5 ML: 100; 10 SOLUTION ORAL at 02:32

## 2025-08-06 RX ADMIN — GUAIFENESIN AND CODEINE PHOSPHATE 5 ML: 100; 10 SOLUTION ORAL at 09:19

## 2025-08-06 RX ADMIN — ENTECAVIR 0.5 MG: 0.5 TABLET ORAL at 09:14

## 2025-08-06 RX ADMIN — BENZONATATE 200 MG: 100 CAPSULE ORAL at 09:15

## 2025-08-06 RX ADMIN — Medication 1 TABLET: at 09:14

## 2025-08-06 RX ADMIN — PREDNISONE 5 MG: 5 TABLET ORAL at 09:14

## 2025-08-06 RX ADMIN — NALTREXONE HYDROCHLORIDE 50 MG: 50 TABLET, FILM COATED ORAL at 09:14

## 2025-08-06 RX ADMIN — BENZONATATE 200 MG: 100 CAPSULE ORAL at 14:33

## 2025-08-06 RX ADMIN — EMPAGLIFLOZIN 10 MG: 10 TABLET, FILM COATED ORAL at 09:14

## 2025-08-06 RX ADMIN — LATANOPROST 1 DROP: 50 SOLUTION OPHTHALMIC at 21:32

## 2025-08-06 RX ADMIN — FLUTICASONE FUROATE AND VILANTEROL TRIFENATATE 1 PUFF: 100; 25 POWDER RESPIRATORY (INHALATION) at 09:13

## 2025-08-06 RX ADMIN — GUAIFENESIN AND CODEINE PHOSPHATE 5 ML: 100; 10 SOLUTION ORAL at 21:32

## 2025-08-06 RX ADMIN — BENZONATATE 200 MG: 100 CAPSULE ORAL at 20:39

## 2025-08-06 RX ADMIN — MYCOPHENOLATE MOFETIL 750 MG: 250 CAPSULE ORAL at 09:14

## 2025-08-06 RX ADMIN — ASPIRIN 81 MG: 81 TABLET, DELAYED RELEASE ORAL at 09:14

## 2025-08-06 RX ADMIN — MYCOPHENOLATE MOFETIL 750 MG: 250 CAPSULE ORAL at 18:02

## 2025-08-06 RX ADMIN — ACETAMINOPHEN 650 MG: 325 TABLET ORAL at 02:32

## 2025-08-06 RX ADMIN — GUAIFENESIN AND CODEINE PHOSPHATE 5 ML: 100; 10 SOLUTION ORAL at 16:56

## 2025-08-06 RX ADMIN — PANTOPRAZOLE SODIUM 40 MG: 40 TABLET, DELAYED RELEASE ORAL at 09:14

## 2025-08-06 RX ADMIN — ROSUVASTATIN CALCIUM 20 MG: 10 TABLET, FILM COATED ORAL at 09:14

## 2025-08-06 RX ADMIN — GUAIFENESIN AND CODEINE PHOSPHATE 5 ML: 100; 10 SOLUTION ORAL at 13:00

## 2025-08-06 ASSESSMENT — ACTIVITIES OF DAILY LIVING (ADL)
ADLS_ACUITY_SCORE: 41

## 2025-08-07 ENCOUNTER — PATIENT OUTREACH (OUTPATIENT)
Dept: CARE COORDINATION | Facility: CLINIC | Age: 65
End: 2025-08-07
Payer: COMMERCIAL

## 2025-08-07 ENCOUNTER — APPOINTMENT (OUTPATIENT)
Dept: OCCUPATIONAL THERAPY | Facility: SKILLED NURSING FACILITY | Age: 65
DRG: 811 | End: 2025-08-07
Attending: INTERNAL MEDICINE
Payer: COMMERCIAL

## 2025-08-07 ENCOUNTER — APPOINTMENT (OUTPATIENT)
Dept: PHYSICAL THERAPY | Facility: SKILLED NURSING FACILITY | Age: 65
DRG: 811 | End: 2025-08-07
Attending: INTERNAL MEDICINE
Payer: COMMERCIAL

## 2025-08-07 VITALS
BODY MASS INDEX: 23.89 KG/M2 | DIASTOLIC BLOOD PRESSURE: 72 MMHG | TEMPERATURE: 97.9 F | RESPIRATION RATE: 16 BRPM | HEART RATE: 73 BPM | OXYGEN SATURATION: 93 % | SYSTOLIC BLOOD PRESSURE: 114 MMHG | WEIGHT: 157.1 LBS

## 2025-08-07 PROCEDURE — 120N000009 HC R&B SNF

## 2025-08-07 PROCEDURE — 250N000012 HC RX MED GY IP 250 OP 636 PS 637: Performed by: INTERNAL MEDICINE

## 2025-08-07 PROCEDURE — 97110 THERAPEUTIC EXERCISES: CPT | Mod: GP

## 2025-08-07 PROCEDURE — 97535 SELF CARE MNGMENT TRAINING: CPT | Mod: GO

## 2025-08-07 PROCEDURE — 250N000013 HC RX MED GY IP 250 OP 250 PS 637: Performed by: INTERNAL MEDICINE

## 2025-08-07 PROCEDURE — 97110 THERAPEUTIC EXERCISES: CPT | Mod: GO

## 2025-08-07 RX ADMIN — GUAIFENESIN AND CODEINE PHOSPHATE 5 ML: 100; 10 SOLUTION ORAL at 21:35

## 2025-08-07 RX ADMIN — GUAIFENESIN AND CODEINE PHOSPHATE 5 ML: 100; 10 SOLUTION ORAL at 08:14

## 2025-08-07 RX ADMIN — PANTOPRAZOLE SODIUM 40 MG: 40 TABLET, DELAYED RELEASE ORAL at 08:15

## 2025-08-07 RX ADMIN — Medication 12.5 MG: at 08:15

## 2025-08-07 RX ADMIN — Medication 1 TABLET: at 08:15

## 2025-08-07 RX ADMIN — MYCOPHENOLATE MOFETIL 750 MG: 250 CAPSULE ORAL at 17:07

## 2025-08-07 RX ADMIN — BENZONATATE 200 MG: 100 CAPSULE ORAL at 12:51

## 2025-08-07 RX ADMIN — GUAIFENESIN AND CODEINE PHOSPHATE 5 ML: 100; 10 SOLUTION ORAL at 17:33

## 2025-08-07 RX ADMIN — MYCOPHENOLATE MOFETIL 750 MG: 250 CAPSULE ORAL at 08:14

## 2025-08-07 RX ADMIN — EMPAGLIFLOZIN 10 MG: 10 TABLET, FILM COATED ORAL at 08:15

## 2025-08-07 RX ADMIN — PREDNISONE 5 MG: 5 TABLET ORAL at 08:15

## 2025-08-07 RX ADMIN — ASPIRIN 81 MG: 81 TABLET, DELAYED RELEASE ORAL at 08:15

## 2025-08-07 RX ADMIN — BENZONATATE 200 MG: 100 CAPSULE ORAL at 08:14

## 2025-08-07 RX ADMIN — ENTECAVIR 0.5 MG: 0.5 TABLET ORAL at 08:15

## 2025-08-07 RX ADMIN — NALTREXONE HYDROCHLORIDE 50 MG: 50 TABLET, FILM COATED ORAL at 08:15

## 2025-08-07 RX ADMIN — FLUOXETINE HYDROCHLORIDE 60 MG: 40 CAPSULE ORAL at 08:15

## 2025-08-07 RX ADMIN — LATANOPROST 1 DROP: 50 SOLUTION OPHTHALMIC at 20:34

## 2025-08-07 RX ADMIN — GUAIFENESIN AND CODEINE PHOSPHATE 5 ML: 100; 10 SOLUTION ORAL at 12:51

## 2025-08-07 RX ADMIN — ROSUVASTATIN CALCIUM 20 MG: 10 TABLET, FILM COATED ORAL at 08:16

## 2025-08-07 RX ADMIN — FLUTICASONE FUROATE AND VILANTEROL TRIFENATATE 1 PUFF: 100; 25 POWDER RESPIRATORY (INHALATION) at 08:16

## 2025-08-07 RX ADMIN — BENZONATATE 200 MG: 100 CAPSULE ORAL at 20:34

## 2025-08-07 ASSESSMENT — ACTIVITIES OF DAILY LIVING (ADL)
ADLS_ACUITY_SCORE: 41

## 2025-08-08 ENCOUNTER — APPOINTMENT (OUTPATIENT)
Dept: OCCUPATIONAL THERAPY | Facility: SKILLED NURSING FACILITY | Age: 65
DRG: 811 | End: 2025-08-08
Attending: INTERNAL MEDICINE
Payer: COMMERCIAL

## 2025-08-08 ENCOUNTER — APPOINTMENT (OUTPATIENT)
Dept: PHYSICAL THERAPY | Facility: SKILLED NURSING FACILITY | Age: 65
DRG: 811 | End: 2025-08-08
Attending: INTERNAL MEDICINE
Payer: COMMERCIAL

## 2025-08-08 LAB
HGB BLD-MCNC: 7.8 G/DL (ref 13.3–17.7)
HOLD SPECIMEN: NORMAL
MCV RBC AUTO: 92 FL (ref 78–100)

## 2025-08-08 PROCEDURE — 85018 HEMOGLOBIN: CPT | Performed by: INTERNAL MEDICINE

## 2025-08-08 PROCEDURE — 36415 COLL VENOUS BLD VENIPUNCTURE: CPT | Performed by: INTERNAL MEDICINE

## 2025-08-08 PROCEDURE — 120N000009 HC R&B SNF

## 2025-08-08 PROCEDURE — 97530 THERAPEUTIC ACTIVITIES: CPT | Mod: GP

## 2025-08-08 PROCEDURE — 250N000013 HC RX MED GY IP 250 OP 250 PS 637: Performed by: INTERNAL MEDICINE

## 2025-08-08 PROCEDURE — 250N000012 HC RX MED GY IP 250 OP 636 PS 637: Performed by: INTERNAL MEDICINE

## 2025-08-08 PROCEDURE — 0HBRXZZ EXCISION OF TOE NAIL, EXTERNAL APPROACH: ICD-10-PCS | Performed by: ASSISTANT, PODIATRIC

## 2025-08-08 PROCEDURE — 11720 DEBRIDE NAIL 1-5: CPT | Performed by: ASSISTANT, PODIATRIC

## 2025-08-08 PROCEDURE — 97535 SELF CARE MNGMENT TRAINING: CPT | Mod: GO

## 2025-08-08 RX ADMIN — Medication 12.5 MG: at 08:13

## 2025-08-08 RX ADMIN — LATANOPROST 1 DROP: 50 SOLUTION OPHTHALMIC at 22:04

## 2025-08-08 RX ADMIN — BENZONATATE 200 MG: 100 CAPSULE ORAL at 13:03

## 2025-08-08 RX ADMIN — FLUTICASONE FUROATE AND VILANTEROL TRIFENATATE 1 PUFF: 100; 25 POWDER RESPIRATORY (INHALATION) at 08:14

## 2025-08-08 RX ADMIN — GUAIFENESIN AND CODEINE PHOSPHATE 5 ML: 100; 10 SOLUTION ORAL at 01:37

## 2025-08-08 RX ADMIN — NALTREXONE HYDROCHLORIDE 50 MG: 50 TABLET, FILM COATED ORAL at 08:13

## 2025-08-08 RX ADMIN — ACETAMINOPHEN 650 MG: 325 TABLET ORAL at 01:37

## 2025-08-08 RX ADMIN — PANTOPRAZOLE SODIUM 40 MG: 40 TABLET, DELAYED RELEASE ORAL at 08:13

## 2025-08-08 RX ADMIN — MYCOPHENOLATE MOFETIL 750 MG: 250 CAPSULE ORAL at 08:13

## 2025-08-08 RX ADMIN — Medication 1 TABLET: at 08:13

## 2025-08-08 RX ADMIN — ASPIRIN 81 MG: 81 TABLET, DELAYED RELEASE ORAL at 08:13

## 2025-08-08 RX ADMIN — BENZONATATE 200 MG: 100 CAPSULE ORAL at 08:12

## 2025-08-08 RX ADMIN — GUAIFENESIN AND CODEINE PHOSPHATE 5 ML: 100; 10 SOLUTION ORAL at 08:21

## 2025-08-08 RX ADMIN — FLUOXETINE HYDROCHLORIDE 60 MG: 40 CAPSULE ORAL at 08:12

## 2025-08-08 RX ADMIN — ENTECAVIR 0.5 MG: 0.5 TABLET ORAL at 08:13

## 2025-08-08 RX ADMIN — BENZONATATE 200 MG: 100 CAPSULE ORAL at 20:41

## 2025-08-08 RX ADMIN — ROSUVASTATIN CALCIUM 20 MG: 10 TABLET, FILM COATED ORAL at 08:13

## 2025-08-08 RX ADMIN — EMPAGLIFLOZIN 10 MG: 10 TABLET, FILM COATED ORAL at 08:13

## 2025-08-08 RX ADMIN — PREDNISONE 5 MG: 5 TABLET ORAL at 08:13

## 2025-08-08 RX ADMIN — GUAIFENESIN AND CODEINE PHOSPHATE 5 ML: 100; 10 SOLUTION ORAL at 13:06

## 2025-08-08 RX ADMIN — GUAIFENESIN AND CODEINE PHOSPHATE 5 ML: 100; 10 SOLUTION ORAL at 22:04

## 2025-08-08 RX ADMIN — ACETAMINOPHEN 650 MG: 325 TABLET ORAL at 22:04

## 2025-08-08 RX ADMIN — MYCOPHENOLATE MOFETIL 750 MG: 250 CAPSULE ORAL at 17:15

## 2025-08-08 RX ADMIN — GUAIFENESIN AND CODEINE PHOSPHATE 5 ML: 100; 10 SOLUTION ORAL at 17:15

## 2025-08-08 ASSESSMENT — ACTIVITIES OF DAILY LIVING (ADL)
ADLS_ACUITY_SCORE: 41

## 2025-08-09 ENCOUNTER — APPOINTMENT (OUTPATIENT)
Dept: PHYSICAL THERAPY | Facility: SKILLED NURSING FACILITY | Age: 65
DRG: 811 | End: 2025-08-09
Attending: INTERNAL MEDICINE
Payer: COMMERCIAL

## 2025-08-09 PROCEDURE — 120N000009 HC R&B SNF

## 2025-08-09 PROCEDURE — 250N000012 HC RX MED GY IP 250 OP 636 PS 637: Performed by: INTERNAL MEDICINE

## 2025-08-09 PROCEDURE — 97110 THERAPEUTIC EXERCISES: CPT | Mod: GP

## 2025-08-09 PROCEDURE — 250N000013 HC RX MED GY IP 250 OP 250 PS 637: Performed by: INTERNAL MEDICINE

## 2025-08-09 PROCEDURE — 250N000011 HC RX IP 250 OP 636: Performed by: INTERNAL MEDICINE

## 2025-08-09 RX ORDER — ONDANSETRON 4 MG/1
4 TABLET, ORALLY DISINTEGRATING ORAL EVERY 6 HOURS PRN
Status: DISCONTINUED | OUTPATIENT
Start: 2025-08-09 | End: 2025-08-16 | Stop reason: HOSPADM

## 2025-08-09 RX ADMIN — MYCOPHENOLATE MOFETIL 750 MG: 250 CAPSULE ORAL at 08:00

## 2025-08-09 RX ADMIN — PANTOPRAZOLE SODIUM 40 MG: 40 TABLET, DELAYED RELEASE ORAL at 08:01

## 2025-08-09 RX ADMIN — GUAIFENESIN AND CODEINE PHOSPHATE 5 ML: 100; 10 SOLUTION ORAL at 10:52

## 2025-08-09 RX ADMIN — PREDNISONE 5 MG: 5 TABLET ORAL at 08:01

## 2025-08-09 RX ADMIN — BENZONATATE 200 MG: 100 CAPSULE ORAL at 14:18

## 2025-08-09 RX ADMIN — FLUTICASONE FUROATE AND VILANTEROL TRIFENATATE 1 PUFF: 100; 25 POWDER RESPIRATORY (INHALATION) at 07:59

## 2025-08-09 RX ADMIN — GUAIFENESIN AND CODEINE PHOSPHATE 5 ML: 100; 10 SOLUTION ORAL at 22:35

## 2025-08-09 RX ADMIN — ASPIRIN 81 MG: 81 TABLET, DELAYED RELEASE ORAL at 08:01

## 2025-08-09 RX ADMIN — BENZONATATE 200 MG: 100 CAPSULE ORAL at 08:00

## 2025-08-09 RX ADMIN — EMPAGLIFLOZIN 10 MG: 10 TABLET, FILM COATED ORAL at 08:00

## 2025-08-09 RX ADMIN — NALTREXONE HYDROCHLORIDE 50 MG: 50 TABLET, FILM COATED ORAL at 08:00

## 2025-08-09 RX ADMIN — GUAIFENESIN AND CODEINE PHOSPHATE 5 ML: 100; 10 SOLUTION ORAL at 16:27

## 2025-08-09 RX ADMIN — ONDANSETRON 4 MG: 4 TABLET, ORALLY DISINTEGRATING ORAL at 09:40

## 2025-08-09 RX ADMIN — Medication 1 TABLET: at 08:01

## 2025-08-09 RX ADMIN — ENTECAVIR 0.5 MG: 0.5 TABLET ORAL at 08:00

## 2025-08-09 RX ADMIN — BENZONATATE 200 MG: 100 CAPSULE ORAL at 20:11

## 2025-08-09 RX ADMIN — Medication 12.5 MG: at 08:00

## 2025-08-09 RX ADMIN — ROSUVASTATIN CALCIUM 20 MG: 10 TABLET, FILM COATED ORAL at 08:00

## 2025-08-09 RX ADMIN — FLUOXETINE HYDROCHLORIDE 60 MG: 40 CAPSULE ORAL at 08:00

## 2025-08-09 RX ADMIN — ACETAMINOPHEN 650 MG: 325 TABLET ORAL at 10:52

## 2025-08-09 RX ADMIN — LATANOPROST 1 DROP: 50 SOLUTION OPHTHALMIC at 20:11

## 2025-08-09 RX ADMIN — MYCOPHENOLATE MOFETIL 750 MG: 250 CAPSULE ORAL at 18:08

## 2025-08-09 ASSESSMENT — ACTIVITIES OF DAILY LIVING (ADL)
ADLS_ACUITY_SCORE: 41

## 2025-08-10 ENCOUNTER — APPOINTMENT (OUTPATIENT)
Dept: OCCUPATIONAL THERAPY | Facility: SKILLED NURSING FACILITY | Age: 65
DRG: 811 | End: 2025-08-10
Attending: INTERNAL MEDICINE
Payer: COMMERCIAL

## 2025-08-10 PROCEDURE — 250N000012 HC RX MED GY IP 250 OP 636 PS 637: Performed by: INTERNAL MEDICINE

## 2025-08-10 PROCEDURE — 97535 SELF CARE MNGMENT TRAINING: CPT | Mod: GO

## 2025-08-10 PROCEDURE — 250N000013 HC RX MED GY IP 250 OP 250 PS 637: Performed by: INTERNAL MEDICINE

## 2025-08-10 PROCEDURE — 250N000011 HC RX IP 250 OP 636: Performed by: INTERNAL MEDICINE

## 2025-08-10 PROCEDURE — 97110 THERAPEUTIC EXERCISES: CPT | Mod: GO

## 2025-08-10 PROCEDURE — 97530 THERAPEUTIC ACTIVITIES: CPT | Mod: GO

## 2025-08-10 PROCEDURE — 022N000001 HC SNF RUG CODE OPNP

## 2025-08-10 PROCEDURE — 120N000009 HC R&B SNF

## 2025-08-10 RX ADMIN — BENZONATATE 200 MG: 100 CAPSULE ORAL at 20:26

## 2025-08-10 RX ADMIN — NALTREXONE HYDROCHLORIDE 50 MG: 50 TABLET, FILM COATED ORAL at 09:37

## 2025-08-10 RX ADMIN — PREDNISONE 5 MG: 5 TABLET ORAL at 09:38

## 2025-08-10 RX ADMIN — MYCOPHENOLATE MOFETIL 750 MG: 250 CAPSULE ORAL at 09:37

## 2025-08-10 RX ADMIN — ONDANSETRON 4 MG: 4 TABLET, ORALLY DISINTEGRATING ORAL at 08:18

## 2025-08-10 RX ADMIN — FLUOXETINE HYDROCHLORIDE 60 MG: 40 CAPSULE ORAL at 09:37

## 2025-08-10 RX ADMIN — BENZONATATE 200 MG: 100 CAPSULE ORAL at 13:33

## 2025-08-10 RX ADMIN — GUAIFENESIN AND CODEINE PHOSPHATE 5 ML: 100; 10 SOLUTION ORAL at 09:38

## 2025-08-10 RX ADMIN — GUAIFENESIN AND CODEINE PHOSPHATE 5 ML: 100; 10 SOLUTION ORAL at 13:33

## 2025-08-10 RX ADMIN — LATANOPROST 1 DROP: 50 SOLUTION OPHTHALMIC at 20:26

## 2025-08-10 RX ADMIN — BENZONATATE 200 MG: 100 CAPSULE ORAL at 09:37

## 2025-08-10 RX ADMIN — ENTECAVIR 0.5 MG: 0.5 TABLET ORAL at 09:37

## 2025-08-10 RX ADMIN — ASPIRIN 81 MG: 81 TABLET, DELAYED RELEASE ORAL at 09:38

## 2025-08-10 RX ADMIN — MYCOPHENOLATE MOFETIL 750 MG: 250 CAPSULE ORAL at 18:17

## 2025-08-10 RX ADMIN — FLUTICASONE FUROATE AND VILANTEROL TRIFENATATE 1 PUFF: 100; 25 POWDER RESPIRATORY (INHALATION) at 09:36

## 2025-08-10 RX ADMIN — ROSUVASTATIN CALCIUM 20 MG: 10 TABLET, FILM COATED ORAL at 09:37

## 2025-08-10 RX ADMIN — EMPAGLIFLOZIN 10 MG: 10 TABLET, FILM COATED ORAL at 09:37

## 2025-08-10 RX ADMIN — ACETAMINOPHEN 650 MG: 325 TABLET ORAL at 09:37

## 2025-08-10 RX ADMIN — PANTOPRAZOLE SODIUM 40 MG: 40 TABLET, DELAYED RELEASE ORAL at 09:37

## 2025-08-10 RX ADMIN — Medication 1 TABLET: at 09:37

## 2025-08-10 RX ADMIN — GUAIFENESIN AND CODEINE PHOSPHATE 5 ML: 100; 10 SOLUTION ORAL at 18:17

## 2025-08-10 ASSESSMENT — ACTIVITIES OF DAILY LIVING (ADL)
ADLS_ACUITY_SCORE: 43
ADLS_ACUITY_SCORE: 43
ADLS_ACUITY_SCORE: 41
ADLS_ACUITY_SCORE: 43
ADLS_ACUITY_SCORE: 41
ADLS_ACUITY_SCORE: 41
ADLS_ACUITY_SCORE: 43
ADLS_ACUITY_SCORE: 43
ADLS_ACUITY_SCORE: 41
ADLS_ACUITY_SCORE: 43
ADLS_ACUITY_SCORE: 41
ADLS_ACUITY_SCORE: 41
ADLS_ACUITY_SCORE: 43
ADLS_ACUITY_SCORE: 41
ADLS_ACUITY_SCORE: 43
ADLS_ACUITY_SCORE: 41
ADLS_ACUITY_SCORE: 43

## 2025-08-11 ENCOUNTER — APPOINTMENT (OUTPATIENT)
Dept: PHYSICAL THERAPY | Facility: SKILLED NURSING FACILITY | Age: 65
DRG: 811 | End: 2025-08-11
Attending: INTERNAL MEDICINE
Payer: COMMERCIAL

## 2025-08-11 ENCOUNTER — APPOINTMENT (OUTPATIENT)
Dept: OCCUPATIONAL THERAPY | Facility: SKILLED NURSING FACILITY | Age: 65
DRG: 811 | End: 2025-08-11
Attending: INTERNAL MEDICINE
Payer: COMMERCIAL

## 2025-08-11 LAB
ANION GAP SERPL CALCULATED.3IONS-SCNC: 12 MMOL/L (ref 7–15)
BUN SERPL-MCNC: 11.3 MG/DL (ref 8–23)
CALCIUM SERPL-MCNC: 8.7 MG/DL (ref 8.8–10.4)
CHLORIDE SERPL-SCNC: 103 MMOL/L (ref 98–107)
CREAT SERPL-MCNC: 0.66 MG/DL (ref 0.67–1.17)
EGFRCR SERPLBLD CKD-EPI 2021: >90 ML/MIN/1.73M2
ERYTHROCYTE [DISTWIDTH] IN BLOOD BY AUTOMATED COUNT: 16.4 % (ref 10–15)
GLUCOSE SERPL-MCNC: 94 MG/DL (ref 70–99)
HCO3 SERPL-SCNC: 24 MMOL/L (ref 22–29)
HCT VFR BLD AUTO: 27.8 % (ref 40–53)
HGB BLD-MCNC: 8.4 G/DL (ref 13.3–17.7)
MCH RBC QN AUTO: 27.4 PG (ref 26.5–33)
MCHC RBC AUTO-ENTMCNC: 30.2 G/DL (ref 31.5–36.5)
MCV RBC AUTO: 91 FL (ref 78–100)
PLATELET # BLD AUTO: 530 10E3/UL (ref 150–450)
POTASSIUM SERPL-SCNC: 3.4 MMOL/L (ref 3.4–5.3)
RBC # BLD AUTO: 3.07 10E6/UL (ref 4.4–5.9)
SODIUM SERPL-SCNC: 139 MMOL/L (ref 135–145)
WBC # BLD AUTO: 9.3 10E3/UL (ref 4–11)

## 2025-08-11 PROCEDURE — 250N000013 HC RX MED GY IP 250 OP 250 PS 637: Performed by: INTERNAL MEDICINE

## 2025-08-11 PROCEDURE — 85014 HEMATOCRIT: CPT | Performed by: INTERNAL MEDICINE

## 2025-08-11 PROCEDURE — 97530 THERAPEUTIC ACTIVITIES: CPT | Mod: GP

## 2025-08-11 PROCEDURE — 120N000009 HC R&B SNF

## 2025-08-11 PROCEDURE — 99309 SBSQ NF CARE MODERATE MDM 30: CPT | Performed by: INTERNAL MEDICINE

## 2025-08-11 PROCEDURE — 250N000012 HC RX MED GY IP 250 OP 636 PS 637: Performed by: INTERNAL MEDICINE

## 2025-08-11 PROCEDURE — 97110 THERAPEUTIC EXERCISES: CPT | Mod: GP

## 2025-08-11 PROCEDURE — 80048 BASIC METABOLIC PNL TOTAL CA: CPT | Performed by: INTERNAL MEDICINE

## 2025-08-11 PROCEDURE — 97535 SELF CARE MNGMENT TRAINING: CPT | Mod: GO

## 2025-08-11 PROCEDURE — 36415 COLL VENOUS BLD VENIPUNCTURE: CPT | Performed by: INTERNAL MEDICINE

## 2025-08-11 RX ORDER — POTASSIUM CHLORIDE 1500 MG/1
40 TABLET, EXTENDED RELEASE ORAL DAILY
Status: COMPLETED | OUTPATIENT
Start: 2025-08-11 | End: 2025-08-13

## 2025-08-11 RX ADMIN — Medication 1 TABLET: at 09:28

## 2025-08-11 RX ADMIN — BENZONATATE 200 MG: 100 CAPSULE ORAL at 13:04

## 2025-08-11 RX ADMIN — FLUTICASONE FUROATE AND VILANTEROL TRIFENATATE 1 PUFF: 100; 25 POWDER RESPIRATORY (INHALATION) at 09:27

## 2025-08-11 RX ADMIN — NALTREXONE HYDROCHLORIDE 50 MG: 50 TABLET, FILM COATED ORAL at 09:28

## 2025-08-11 RX ADMIN — ROSUVASTATIN CALCIUM 20 MG: 10 TABLET, FILM COATED ORAL at 09:28

## 2025-08-11 RX ADMIN — MYCOPHENOLATE MOFETIL 750 MG: 250 CAPSULE ORAL at 09:27

## 2025-08-11 RX ADMIN — EMPAGLIFLOZIN 10 MG: 10 TABLET, FILM COATED ORAL at 09:28

## 2025-08-11 RX ADMIN — BENZONATATE 200 MG: 100 CAPSULE ORAL at 20:12

## 2025-08-11 RX ADMIN — PREDNISONE 5 MG: 5 TABLET ORAL at 09:28

## 2025-08-11 RX ADMIN — PANTOPRAZOLE SODIUM 40 MG: 40 TABLET, DELAYED RELEASE ORAL at 09:28

## 2025-08-11 RX ADMIN — ENTECAVIR 0.5 MG: 0.5 TABLET ORAL at 09:28

## 2025-08-11 RX ADMIN — ASPIRIN 81 MG: 81 TABLET, DELAYED RELEASE ORAL at 09:28

## 2025-08-11 RX ADMIN — POLYETHYLENE GLYCOL 3350 17 G: 17 POWDER, FOR SOLUTION ORAL at 16:40

## 2025-08-11 RX ADMIN — GUAIFENESIN AND CODEINE PHOSPHATE 5 ML: 100; 10 SOLUTION ORAL at 13:26

## 2025-08-11 RX ADMIN — FLUOXETINE HYDROCHLORIDE 60 MG: 40 CAPSULE ORAL at 09:27

## 2025-08-11 RX ADMIN — POTASSIUM CHLORIDE EXTENDED-RELEASE 40 MEQ: 1500 TABLET ORAL at 13:04

## 2025-08-11 RX ADMIN — MYCOPHENOLATE MOFETIL 750 MG: 250 CAPSULE ORAL at 18:05

## 2025-08-11 RX ADMIN — GUAIFENESIN AND CODEINE PHOSPHATE 5 ML: 100; 10 SOLUTION ORAL at 20:16

## 2025-08-11 RX ADMIN — ACETAMINOPHEN 650 MG: 325 TABLET ORAL at 05:35

## 2025-08-11 RX ADMIN — BENZONATATE 200 MG: 100 CAPSULE ORAL at 09:28

## 2025-08-11 RX ADMIN — Medication 12.5 MG: at 09:28

## 2025-08-11 RX ADMIN — LATANOPROST 1 DROP: 50 SOLUTION OPHTHALMIC at 20:12

## 2025-08-11 ASSESSMENT — ACTIVITIES OF DAILY LIVING (ADL)
ADLS_ACUITY_SCORE: 43

## 2025-08-12 ENCOUNTER — APPOINTMENT (OUTPATIENT)
Dept: OCCUPATIONAL THERAPY | Facility: SKILLED NURSING FACILITY | Age: 65
DRG: 811 | End: 2025-08-12
Attending: INTERNAL MEDICINE
Payer: COMMERCIAL

## 2025-08-12 ENCOUNTER — APPOINTMENT (OUTPATIENT)
Dept: PHYSICAL THERAPY | Facility: SKILLED NURSING FACILITY | Age: 65
DRG: 811 | End: 2025-08-12
Attending: INTERNAL MEDICINE
Payer: COMMERCIAL

## 2025-08-12 PROCEDURE — 250N000013 HC RX MED GY IP 250 OP 250 PS 637: Performed by: INTERNAL MEDICINE

## 2025-08-12 PROCEDURE — 97535 SELF CARE MNGMENT TRAINING: CPT | Mod: GO

## 2025-08-12 PROCEDURE — 97530 THERAPEUTIC ACTIVITIES: CPT | Mod: GP

## 2025-08-12 PROCEDURE — 250N000012 HC RX MED GY IP 250 OP 636 PS 637: Performed by: INTERNAL MEDICINE

## 2025-08-12 PROCEDURE — 97110 THERAPEUTIC EXERCISES: CPT | Mod: GO

## 2025-08-12 PROCEDURE — 97110 THERAPEUTIC EXERCISES: CPT | Mod: GP

## 2025-08-12 PROCEDURE — 120N000009 HC R&B SNF

## 2025-08-12 RX ADMIN — FLUOXETINE HYDROCHLORIDE 60 MG: 40 CAPSULE ORAL at 09:04

## 2025-08-12 RX ADMIN — FLUTICASONE FUROATE AND VILANTEROL TRIFENATATE 1 PUFF: 100; 25 POWDER RESPIRATORY (INHALATION) at 09:04

## 2025-08-12 RX ADMIN — MYCOPHENOLATE MOFETIL 750 MG: 250 CAPSULE ORAL at 17:15

## 2025-08-12 RX ADMIN — NALTREXONE HYDROCHLORIDE 50 MG: 50 TABLET, FILM COATED ORAL at 09:04

## 2025-08-12 RX ADMIN — PREDNISONE 5 MG: 5 TABLET ORAL at 09:05

## 2025-08-12 RX ADMIN — ENTECAVIR 0.5 MG: 0.5 TABLET ORAL at 09:05

## 2025-08-12 RX ADMIN — Medication 1 TABLET: at 09:05

## 2025-08-12 RX ADMIN — BENZONATATE 200 MG: 100 CAPSULE ORAL at 15:23

## 2025-08-12 RX ADMIN — GUAIFENESIN AND CODEINE PHOSPHATE 5 ML: 100; 10 SOLUTION ORAL at 18:35

## 2025-08-12 RX ADMIN — Medication 6 MG: at 19:57

## 2025-08-12 RX ADMIN — BENZONATATE 200 MG: 100 CAPSULE ORAL at 19:57

## 2025-08-12 RX ADMIN — LATANOPROST 1 DROP: 50 SOLUTION OPHTHALMIC at 19:57

## 2025-08-12 RX ADMIN — ROSUVASTATIN CALCIUM 20 MG: 10 TABLET, FILM COATED ORAL at 09:04

## 2025-08-12 RX ADMIN — PANTOPRAZOLE SODIUM 40 MG: 40 TABLET, DELAYED RELEASE ORAL at 09:05

## 2025-08-12 RX ADMIN — EMPAGLIFLOZIN 10 MG: 10 TABLET, FILM COATED ORAL at 09:04

## 2025-08-12 RX ADMIN — MYCOPHENOLATE MOFETIL 750 MG: 250 CAPSULE ORAL at 09:05

## 2025-08-12 RX ADMIN — ASPIRIN 81 MG: 81 TABLET, DELAYED RELEASE ORAL at 09:04

## 2025-08-12 RX ADMIN — BENZONATATE 200 MG: 100 CAPSULE ORAL at 09:04

## 2025-08-12 RX ADMIN — Medication 12.5 MG: at 09:05

## 2025-08-12 RX ADMIN — POTASSIUM CHLORIDE EXTENDED-RELEASE 40 MEQ: 1500 TABLET ORAL at 09:04

## 2025-08-12 ASSESSMENT — ACTIVITIES OF DAILY LIVING (ADL)
ADLS_ACUITY_SCORE: 43

## 2025-08-13 ENCOUNTER — APPOINTMENT (OUTPATIENT)
Dept: OCCUPATIONAL THERAPY | Facility: SKILLED NURSING FACILITY | Age: 65
DRG: 811 | End: 2025-08-13
Attending: INTERNAL MEDICINE
Payer: COMMERCIAL

## 2025-08-13 ENCOUNTER — APPOINTMENT (OUTPATIENT)
Dept: PHYSICAL THERAPY | Facility: SKILLED NURSING FACILITY | Age: 65
DRG: 811 | End: 2025-08-13
Attending: INTERNAL MEDICINE
Payer: COMMERCIAL

## 2025-08-13 PROCEDURE — 250N000012 HC RX MED GY IP 250 OP 636 PS 637: Performed by: INTERNAL MEDICINE

## 2025-08-13 PROCEDURE — 250N000013 HC RX MED GY IP 250 OP 250 PS 637: Performed by: INTERNAL MEDICINE

## 2025-08-13 PROCEDURE — 120N000009 HC R&B SNF

## 2025-08-13 PROCEDURE — 97535 SELF CARE MNGMENT TRAINING: CPT | Mod: GO

## 2025-08-13 PROCEDURE — 97530 THERAPEUTIC ACTIVITIES: CPT | Mod: GP

## 2025-08-13 RX ADMIN — PREDNISONE 5 MG: 5 TABLET ORAL at 08:32

## 2025-08-13 RX ADMIN — PANTOPRAZOLE SODIUM 40 MG: 40 TABLET, DELAYED RELEASE ORAL at 08:32

## 2025-08-13 RX ADMIN — GUAIFENESIN AND CODEINE PHOSPHATE 5 ML: 100; 10 SOLUTION ORAL at 18:18

## 2025-08-13 RX ADMIN — BENZONATATE 200 MG: 100 CAPSULE ORAL at 08:30

## 2025-08-13 RX ADMIN — POLYETHYLENE GLYCOL 3350 17 G: 17 POWDER, FOR SOLUTION ORAL at 08:48

## 2025-08-13 RX ADMIN — NALTREXONE HYDROCHLORIDE 50 MG: 50 TABLET, FILM COATED ORAL at 08:32

## 2025-08-13 RX ADMIN — EMPAGLIFLOZIN 10 MG: 10 TABLET, FILM COATED ORAL at 08:32

## 2025-08-13 RX ADMIN — MYCOPHENOLATE MOFETIL 750 MG: 250 CAPSULE ORAL at 18:18

## 2025-08-13 RX ADMIN — FLUTICASONE FUROATE AND VILANTEROL TRIFENATATE 1 PUFF: 100; 25 POWDER RESPIRATORY (INHALATION) at 08:34

## 2025-08-13 RX ADMIN — POTASSIUM CHLORIDE EXTENDED-RELEASE 40 MEQ: 1500 TABLET ORAL at 08:32

## 2025-08-13 RX ADMIN — ENTECAVIR 0.5 MG: 0.5 TABLET ORAL at 08:32

## 2025-08-13 RX ADMIN — FLUOXETINE HYDROCHLORIDE 60 MG: 40 CAPSULE ORAL at 08:32

## 2025-08-13 RX ADMIN — BENZONATATE 200 MG: 100 CAPSULE ORAL at 14:09

## 2025-08-13 RX ADMIN — ASPIRIN 81 MG: 81 TABLET, DELAYED RELEASE ORAL at 08:32

## 2025-08-13 RX ADMIN — LATANOPROST 1 DROP: 50 SOLUTION OPHTHALMIC at 20:39

## 2025-08-13 RX ADMIN — BENZONATATE 200 MG: 100 CAPSULE ORAL at 20:39

## 2025-08-13 RX ADMIN — MYCOPHENOLATE MOFETIL 750 MG: 250 CAPSULE ORAL at 08:31

## 2025-08-13 RX ADMIN — Medication 1 TABLET: at 08:32

## 2025-08-13 RX ADMIN — ROSUVASTATIN CALCIUM 20 MG: 10 TABLET, FILM COATED ORAL at 08:31

## 2025-08-13 RX ADMIN — GUAIFENESIN AND CODEINE PHOSPHATE 5 ML: 100; 10 SOLUTION ORAL at 08:44

## 2025-08-13 RX ADMIN — GUAIFENESIN AND CODEINE PHOSPHATE 5 ML: 100; 10 SOLUTION ORAL at 14:11

## 2025-08-13 ASSESSMENT — ACTIVITIES OF DAILY LIVING (ADL)
ADLS_ACUITY_SCORE: 43
ADLS_ACUITY_SCORE: 43
ADLS_ACUITY_SCORE: 42
ADLS_ACUITY_SCORE: 43
ADLS_ACUITY_SCORE: 42
ADLS_ACUITY_SCORE: 43
ADLS_ACUITY_SCORE: 43
ADLS_ACUITY_SCORE: 42
ADLS_ACUITY_SCORE: 43
ADLS_ACUITY_SCORE: 42
ADLS_ACUITY_SCORE: 43

## 2025-08-14 ENCOUNTER — APPOINTMENT (OUTPATIENT)
Dept: OCCUPATIONAL THERAPY | Facility: SKILLED NURSING FACILITY | Age: 65
DRG: 811 | End: 2025-08-14
Attending: INTERNAL MEDICINE
Payer: COMMERCIAL

## 2025-08-14 ENCOUNTER — APPOINTMENT (OUTPATIENT)
Dept: PHYSICAL THERAPY | Facility: SKILLED NURSING FACILITY | Age: 65
DRG: 811 | End: 2025-08-14
Attending: INTERNAL MEDICINE
Payer: COMMERCIAL

## 2025-08-14 VITALS
DIASTOLIC BLOOD PRESSURE: 66 MMHG | BODY MASS INDEX: 23.36 KG/M2 | OXYGEN SATURATION: 99 % | SYSTOLIC BLOOD PRESSURE: 109 MMHG | TEMPERATURE: 98 F | WEIGHT: 153.66 LBS | HEART RATE: 69 BPM | RESPIRATION RATE: 18 BRPM

## 2025-08-14 LAB
ANION GAP SERPL CALCULATED.3IONS-SCNC: 11 MMOL/L (ref 7–15)
BUN SERPL-MCNC: 11.6 MG/DL (ref 8–23)
CALCIUM SERPL-MCNC: 8.7 MG/DL (ref 8.8–10.4)
CHLORIDE SERPL-SCNC: 106 MMOL/L (ref 98–107)
CREAT SERPL-MCNC: 0.7 MG/DL (ref 0.67–1.17)
EGFRCR SERPLBLD CKD-EPI 2021: >90 ML/MIN/1.73M2
ERYTHROCYTE [DISTWIDTH] IN BLOOD BY AUTOMATED COUNT: 16.7 % (ref 10–15)
GLUCOSE SERPL-MCNC: 94 MG/DL (ref 70–99)
HCO3 SERPL-SCNC: 22 MMOL/L (ref 22–29)
HCT VFR BLD AUTO: 28.4 % (ref 40–53)
HGB BLD-MCNC: 8.5 G/DL (ref 13.3–17.7)
MCH RBC QN AUTO: 26.9 PG (ref 26.5–33)
MCHC RBC AUTO-ENTMCNC: 29.9 G/DL (ref 31.5–36.5)
MCV RBC AUTO: 89.9 FL (ref 78–100)
PLATELET # BLD AUTO: 512 10E3/UL (ref 150–450)
POTASSIUM SERPL-SCNC: 4 MMOL/L (ref 3.4–5.3)
RBC # BLD AUTO: 3.16 10E6/UL (ref 4.4–5.9)
SODIUM SERPL-SCNC: 139 MMOL/L (ref 135–145)
WBC # BLD AUTO: 8.79 10E3/UL (ref 4–11)

## 2025-08-14 PROCEDURE — 120N000009 HC R&B SNF

## 2025-08-14 PROCEDURE — 250N000013 HC RX MED GY IP 250 OP 250 PS 637: Performed by: INTERNAL MEDICINE

## 2025-08-14 PROCEDURE — 97535 SELF CARE MNGMENT TRAINING: CPT | Mod: GO

## 2025-08-14 PROCEDURE — 36415 COLL VENOUS BLD VENIPUNCTURE: CPT | Performed by: INTERNAL MEDICINE

## 2025-08-14 PROCEDURE — 97110 THERAPEUTIC EXERCISES: CPT | Mod: GP

## 2025-08-14 PROCEDURE — 99310 SBSQ NF CARE HIGH MDM 45: CPT | Performed by: STUDENT IN AN ORGANIZED HEALTH CARE EDUCATION/TRAINING PROGRAM

## 2025-08-14 PROCEDURE — 80048 BASIC METABOLIC PNL TOTAL CA: CPT | Performed by: INTERNAL MEDICINE

## 2025-08-14 PROCEDURE — 85027 COMPLETE CBC AUTOMATED: CPT | Performed by: INTERNAL MEDICINE

## 2025-08-14 PROCEDURE — 250N000012 HC RX MED GY IP 250 OP 636 PS 637: Performed by: INTERNAL MEDICINE

## 2025-08-14 RX ADMIN — LATANOPROST 1 DROP: 50 SOLUTION OPHTHALMIC at 20:35

## 2025-08-14 RX ADMIN — BENZONATATE 200 MG: 100 CAPSULE ORAL at 08:31

## 2025-08-14 RX ADMIN — PANTOPRAZOLE SODIUM 40 MG: 40 TABLET, DELAYED RELEASE ORAL at 08:30

## 2025-08-14 RX ADMIN — MYCOPHENOLATE MOFETIL 750 MG: 250 CAPSULE ORAL at 17:57

## 2025-08-14 RX ADMIN — HYDROXYZINE HYDROCHLORIDE 25 MG: 25 TABLET ORAL at 21:32

## 2025-08-14 RX ADMIN — Medication 1 TABLET: at 08:30

## 2025-08-14 RX ADMIN — GUAIFENESIN AND CODEINE PHOSPHATE 5 ML: 100; 10 SOLUTION ORAL at 17:58

## 2025-08-14 RX ADMIN — GUAIFENESIN AND CODEINE PHOSPHATE 5 ML: 100; 10 SOLUTION ORAL at 01:27

## 2025-08-14 RX ADMIN — NALTREXONE HYDROCHLORIDE 50 MG: 50 TABLET, FILM COATED ORAL at 08:30

## 2025-08-14 RX ADMIN — MYCOPHENOLATE MOFETIL 750 MG: 250 CAPSULE ORAL at 08:31

## 2025-08-14 RX ADMIN — GUAIFENESIN AND CODEINE PHOSPHATE 5 ML: 100; 10 SOLUTION ORAL at 08:29

## 2025-08-14 RX ADMIN — ENTECAVIR 0.5 MG: 0.5 TABLET ORAL at 08:31

## 2025-08-14 RX ADMIN — BENZONATATE 200 MG: 100 CAPSULE ORAL at 14:49

## 2025-08-14 RX ADMIN — Medication 12.5 MG: at 08:30

## 2025-08-14 RX ADMIN — GUAIFENESIN AND CODEINE PHOSPHATE 5 ML: 100; 10 SOLUTION ORAL at 12:46

## 2025-08-14 RX ADMIN — EMPAGLIFLOZIN 10 MG: 10 TABLET, FILM COATED ORAL at 08:29

## 2025-08-14 RX ADMIN — FLUTICASONE FUROATE AND VILANTEROL TRIFENATATE 1 PUFF: 100; 25 POWDER RESPIRATORY (INHALATION) at 08:33

## 2025-08-14 RX ADMIN — PREDNISONE 5 MG: 5 TABLET ORAL at 08:30

## 2025-08-14 RX ADMIN — FLUOXETINE HYDROCHLORIDE 60 MG: 40 CAPSULE ORAL at 08:30

## 2025-08-14 RX ADMIN — BENZONATATE 200 MG: 100 CAPSULE ORAL at 20:35

## 2025-08-14 RX ADMIN — ROSUVASTATIN CALCIUM 20 MG: 10 TABLET, FILM COATED ORAL at 08:31

## 2025-08-14 RX ADMIN — ASPIRIN 81 MG: 81 TABLET, DELAYED RELEASE ORAL at 08:30

## 2025-08-14 ASSESSMENT — ACTIVITIES OF DAILY LIVING (ADL)
ADLS_ACUITY_SCORE: 42

## 2025-08-15 ENCOUNTER — APPOINTMENT (OUTPATIENT)
Dept: OCCUPATIONAL THERAPY | Facility: SKILLED NURSING FACILITY | Age: 65
DRG: 811 | End: 2025-08-15
Attending: INTERNAL MEDICINE
Payer: COMMERCIAL

## 2025-08-15 ENCOUNTER — APPOINTMENT (OUTPATIENT)
Dept: PHYSICAL THERAPY | Facility: SKILLED NURSING FACILITY | Age: 65
DRG: 811 | End: 2025-08-15
Attending: INTERNAL MEDICINE
Payer: COMMERCIAL

## 2025-08-15 PROCEDURE — 250N000012 HC RX MED GY IP 250 OP 636 PS 637: Performed by: INTERNAL MEDICINE

## 2025-08-15 PROCEDURE — 250N000013 HC RX MED GY IP 250 OP 250 PS 637: Performed by: INTERNAL MEDICINE

## 2025-08-15 PROCEDURE — 99316 NF DSCHRG MGMT 30 MIN+: CPT | Performed by: STUDENT IN AN ORGANIZED HEALTH CARE EDUCATION/TRAINING PROGRAM

## 2025-08-15 PROCEDURE — 97110 THERAPEUTIC EXERCISES: CPT | Mod: GP

## 2025-08-15 PROCEDURE — 97530 THERAPEUTIC ACTIVITIES: CPT | Mod: GP

## 2025-08-15 PROCEDURE — 97535 SELF CARE MNGMENT TRAINING: CPT | Mod: GO

## 2025-08-15 PROCEDURE — 120N000009 HC R&B SNF

## 2025-08-15 RX ADMIN — NALTREXONE HYDROCHLORIDE 50 MG: 50 TABLET, FILM COATED ORAL at 09:23

## 2025-08-15 RX ADMIN — FLUOXETINE HYDROCHLORIDE 60 MG: 40 CAPSULE ORAL at 09:21

## 2025-08-15 RX ADMIN — MYCOPHENOLATE MOFETIL 750 MG: 250 CAPSULE ORAL at 09:24

## 2025-08-15 RX ADMIN — ROSUVASTATIN CALCIUM 20 MG: 10 TABLET, FILM COATED ORAL at 09:24

## 2025-08-15 RX ADMIN — GUAIFENESIN AND CODEINE PHOSPHATE 5 ML: 100; 10 SOLUTION ORAL at 20:53

## 2025-08-15 RX ADMIN — GUAIFENESIN AND CODEINE PHOSPHATE 5 ML: 100; 10 SOLUTION ORAL at 16:52

## 2025-08-15 RX ADMIN — PANTOPRAZOLE SODIUM 40 MG: 40 TABLET, DELAYED RELEASE ORAL at 09:23

## 2025-08-15 RX ADMIN — LATANOPROST 1 DROP: 50 SOLUTION OPHTHALMIC at 20:53

## 2025-08-15 RX ADMIN — BENZONATATE 200 MG: 100 CAPSULE ORAL at 20:54

## 2025-08-15 RX ADMIN — ASPIRIN 81 MG: 81 TABLET, DELAYED RELEASE ORAL at 09:22

## 2025-08-15 RX ADMIN — Medication 6 MG: at 20:53

## 2025-08-15 RX ADMIN — MYCOPHENOLATE MOFETIL 750 MG: 250 CAPSULE ORAL at 18:13

## 2025-08-15 RX ADMIN — ENTECAVIR 0.5 MG: 0.5 TABLET ORAL at 09:23

## 2025-08-15 RX ADMIN — POLYETHYLENE GLYCOL 3350 17 G: 17 POWDER, FOR SOLUTION ORAL at 09:30

## 2025-08-15 RX ADMIN — EMPAGLIFLOZIN 10 MG: 10 TABLET, FILM COATED ORAL at 09:21

## 2025-08-15 RX ADMIN — FLUTICASONE FUROATE AND VILANTEROL TRIFENATATE 1 PUFF: 100; 25 POWDER RESPIRATORY (INHALATION) at 09:24

## 2025-08-15 RX ADMIN — BENZONATATE 200 MG: 100 CAPSULE ORAL at 14:11

## 2025-08-15 RX ADMIN — Medication 1 TABLET: at 09:23

## 2025-08-15 RX ADMIN — PREDNISONE 5 MG: 5 TABLET ORAL at 09:22

## 2025-08-15 RX ADMIN — BENZONATATE 200 MG: 100 CAPSULE ORAL at 09:23

## 2025-08-15 ASSESSMENT — ACTIVITIES OF DAILY LIVING (ADL)
ADLS_ACUITY_SCORE: 42

## 2025-08-16 VITALS
DIASTOLIC BLOOD PRESSURE: 65 MMHG | TEMPERATURE: 97.9 F | BODY MASS INDEX: 23.36 KG/M2 | SYSTOLIC BLOOD PRESSURE: 117 MMHG | HEART RATE: 62 BPM | OXYGEN SATURATION: 98 % | WEIGHT: 153.66 LBS | RESPIRATION RATE: 18 BRPM

## 2025-08-16 PROCEDURE — 250N000013 HC RX MED GY IP 250 OP 250 PS 637: Performed by: INTERNAL MEDICINE

## 2025-08-16 PROCEDURE — 250N000012 HC RX MED GY IP 250 OP 636 PS 637: Performed by: INTERNAL MEDICINE

## 2025-08-16 RX ORDER — METOPROLOL SUCCINATE 25 MG/1
12.5 TABLET, EXTENDED RELEASE ORAL DAILY
Qty: 30 TABLET | Refills: 0 | Status: ON HOLD | OUTPATIENT
Start: 2025-08-17

## 2025-08-16 RX ORDER — LATANOPROST 50 UG/ML
1 SOLUTION/ DROPS OPHTHALMIC AT BEDTIME
Qty: 2.5 ML | Refills: 11 | Status: ON HOLD | OUTPATIENT
Start: 2025-08-16

## 2025-08-16 RX ORDER — CODEINE PHOSPHATE AND GUAIFENESIN 10; 100 MG/5ML; MG/5ML
5 SOLUTION ORAL EVERY 4 HOURS PRN
Qty: 473 ML | Refills: 1 | Status: ON HOLD | OUTPATIENT
Start: 2025-08-16

## 2025-08-16 RX ORDER — PREDNISONE 5 MG/1
5 TABLET ORAL DAILY
Qty: 90 TABLET | Refills: 3 | Status: ON HOLD | OUTPATIENT
Start: 2025-08-16

## 2025-08-16 RX ORDER — PANTOPRAZOLE SODIUM 40 MG/1
40 TABLET, DELAYED RELEASE ORAL DAILY
Qty: 90 TABLET | Refills: 3 | Status: ON HOLD | OUTPATIENT
Start: 2025-08-16

## 2025-08-16 RX ORDER — ASPIRIN 81 MG/1
81 TABLET, COATED ORAL DAILY
Qty: 30 TABLET | Refills: 0 | Status: ON HOLD | OUTPATIENT
Start: 2025-08-17

## 2025-08-16 RX ORDER — HYDROXYZINE HYDROCHLORIDE 25 MG/1
25 TABLET, FILM COATED ORAL EVERY 6 HOURS PRN
Qty: 30 TABLET | Refills: 0 | Status: ON HOLD | OUTPATIENT
Start: 2025-08-16

## 2025-08-16 RX ORDER — ENTECAVIR 0.5 MG/1
0.5 TABLET, FILM COATED ORAL DAILY
Qty: 30 TABLET | Refills: 1 | Status: ON HOLD | OUTPATIENT
Start: 2025-08-17

## 2025-08-16 RX ORDER — ACETAMINOPHEN 325 MG/1
650 TABLET ORAL EVERY 4 HOURS PRN
Qty: 30 TABLET | Refills: 0 | Status: ON HOLD | OUTPATIENT
Start: 2025-08-16

## 2025-08-16 RX ORDER — MYCOPHENOLATE MOFETIL 250 MG/1
750 CAPSULE ORAL 2 TIMES DAILY
Qty: 90 CAPSULE | Refills: 1 | Status: ON HOLD | OUTPATIENT
Start: 2025-08-16

## 2025-08-16 RX ORDER — ROSUVASTATIN CALCIUM 20 MG/1
20 TABLET, COATED ORAL DAILY
Qty: 90 TABLET | Refills: 3 | Status: ON HOLD | OUTPATIENT
Start: 2025-08-16

## 2025-08-16 RX ADMIN — ROSUVASTATIN CALCIUM 20 MG: 10 TABLET, FILM COATED ORAL at 08:20

## 2025-08-16 RX ADMIN — FLUTICASONE FUROATE AND VILANTEROL TRIFENATATE 1 PUFF: 100; 25 POWDER RESPIRATORY (INHALATION) at 08:18

## 2025-08-16 RX ADMIN — PANTOPRAZOLE SODIUM 40 MG: 40 TABLET, DELAYED RELEASE ORAL at 08:19

## 2025-08-16 RX ADMIN — Medication 1 TABLET: at 08:18

## 2025-08-16 RX ADMIN — ASPIRIN 81 MG: 81 TABLET, DELAYED RELEASE ORAL at 08:20

## 2025-08-16 RX ADMIN — Medication 12.5 MG: at 08:19

## 2025-08-16 RX ADMIN — ENTECAVIR 0.5 MG: 0.5 TABLET ORAL at 08:20

## 2025-08-16 RX ADMIN — PREDNISONE 5 MG: 5 TABLET ORAL at 08:20

## 2025-08-16 RX ADMIN — BENZONATATE 200 MG: 100 CAPSULE ORAL at 08:20

## 2025-08-16 RX ADMIN — NALTREXONE HYDROCHLORIDE 50 MG: 50 TABLET, FILM COATED ORAL at 08:19

## 2025-08-16 RX ADMIN — MYCOPHENOLATE MOFETIL 750 MG: 250 CAPSULE ORAL at 08:20

## 2025-08-16 RX ADMIN — EMPAGLIFLOZIN 10 MG: 10 TABLET, FILM COATED ORAL at 08:20

## 2025-08-16 RX ADMIN — FLUOXETINE HYDROCHLORIDE 60 MG: 40 CAPSULE ORAL at 08:18

## 2025-08-16 ASSESSMENT — ACTIVITIES OF DAILY LIVING (ADL)
ADLS_ACUITY_SCORE: 42

## 2025-08-17 ENCOUNTER — HOSPITAL ENCOUNTER (INPATIENT)
Facility: HOSPITAL | Age: 65
DRG: 189 | End: 2025-08-17
Attending: STUDENT IN AN ORGANIZED HEALTH CARE EDUCATION/TRAINING PROGRAM | Admitting: STUDENT IN AN ORGANIZED HEALTH CARE EDUCATION/TRAINING PROGRAM
Payer: COMMERCIAL

## 2025-08-17 ENCOUNTER — APPOINTMENT (OUTPATIENT)
Dept: CT IMAGING | Facility: HOSPITAL | Age: 65
DRG: 189 | End: 2025-08-17
Attending: STUDENT IN AN ORGANIZED HEALTH CARE EDUCATION/TRAINING PROGRAM
Payer: COMMERCIAL

## 2025-08-17 DIAGNOSIS — F41.1 GENERALIZED ANXIETY DISORDER: Chronic | ICD-10-CM

## 2025-08-17 DIAGNOSIS — Z95.1 S/P CABG (CORONARY ARTERY BYPASS GRAFT): ICD-10-CM

## 2025-08-17 DIAGNOSIS — I25.118 CORONARY ARTERY DISEASE OF NATIVE ARTERY OF NATIVE HEART WITH STABLE ANGINA PECTORIS: ICD-10-CM

## 2025-08-17 DIAGNOSIS — K59.01 SLOW TRANSIT CONSTIPATION: ICD-10-CM

## 2025-08-17 DIAGNOSIS — M25.551 HIP PAIN, RIGHT: ICD-10-CM

## 2025-08-17 DIAGNOSIS — I50.32 CHRONIC HEART FAILURE WITH PRESERVED EJECTION FRACTION (H): ICD-10-CM

## 2025-08-17 DIAGNOSIS — Z94.0 RENAL TRANSPLANT, STATUS POST: ICD-10-CM

## 2025-08-17 DIAGNOSIS — R93.89 ABNORMAL CT OF THE CHEST: ICD-10-CM

## 2025-08-17 DIAGNOSIS — J44.1 COPD EXACERBATION (H): ICD-10-CM

## 2025-08-17 DIAGNOSIS — R09.02 HYPOXIA: ICD-10-CM

## 2025-08-17 DIAGNOSIS — R53.1 GENERALIZED WEAKNESS: ICD-10-CM

## 2025-08-17 DIAGNOSIS — R06.02 SHORTNESS OF BREATH: Primary | ICD-10-CM

## 2025-08-17 DIAGNOSIS — J96.01 ACUTE RESPIRATORY FAILURE WITH HYPOXIA (H): ICD-10-CM

## 2025-08-17 DIAGNOSIS — F10.90 ALCOHOL USE DISORDER: ICD-10-CM

## 2025-08-17 DIAGNOSIS — J18.9 COMMUNITY ACQUIRED PNEUMONIA, UNSPECIFIED LATERALITY: ICD-10-CM

## 2025-08-17 DIAGNOSIS — N18.6 END STAGE RENAL DISEASE (H): ICD-10-CM

## 2025-08-17 LAB
ANION GAP SERPL CALCULATED.3IONS-SCNC: 17 MMOL/L (ref 7–15)
BASE EXCESS BLDV CALC-SCNC: 0.5 MMOL/L (ref -3–3)
BUN SERPL-MCNC: 9.2 MG/DL (ref 8–23)
CALCIUM SERPL-MCNC: 9.1 MG/DL (ref 8.8–10.4)
CHLORIDE SERPL-SCNC: 106 MMOL/L (ref 98–107)
CREAT SERPL-MCNC: 0.62 MG/DL (ref 0.67–1.17)
EGFRCR SERPLBLD CKD-EPI 2021: >90 ML/MIN/1.73M2
ERYTHROCYTE [DISTWIDTH] IN BLOOD BY AUTOMATED COUNT: 16.1 % (ref 10–15)
GLUCOSE SERPL-MCNC: 82 MG/DL (ref 70–99)
HCO3 BLDV-SCNC: 22 MMOL/L (ref 21–28)
HCO3 SERPL-SCNC: 19 MMOL/L (ref 22–29)
HCT VFR BLD AUTO: 31.2 % (ref 40–53)
HGB BLD-MCNC: 9.2 G/DL (ref 13.3–17.7)
HOLD SPECIMEN: NORMAL
MCH RBC QN AUTO: 25.6 PG (ref 26.5–33)
MCHC RBC AUTO-ENTMCNC: 29.5 G/DL (ref 31.5–36.5)
MCV RBC AUTO: 86.7 FL (ref 78–100)
NT-PROBNP SERPL-MCNC: 387 PG/ML (ref 0–177)
O2/TOTAL GAS SETTING VFR VENT: 21 %
OXYHGB MFR BLDV: 79 % (ref 70–75)
PCO2 BLDV: 27 MM HG (ref 40–50)
PH BLDV: 7.53 [PH] (ref 7.32–7.43)
PLATELET # BLD AUTO: 545 10E3/UL (ref 150–450)
PO2 BLDV: 39 MM HG (ref 25–47)
POTASSIUM SERPL-SCNC: 3.2 MMOL/L (ref 3.4–5.3)
RBC # BLD AUTO: 3.6 10E6/UL (ref 4.4–5.9)
SAO2 % BLDV: 80.3 % (ref 70–75)
SODIUM SERPL-SCNC: 142 MMOL/L (ref 135–145)
WBC # BLD AUTO: 8.7 10E3/UL (ref 4–11)

## 2025-08-17 PROCEDURE — 82805 BLOOD GASES W/O2 SATURATION: CPT | Performed by: STUDENT IN AN ORGANIZED HEALTH CARE EDUCATION/TRAINING PROGRAM

## 2025-08-17 PROCEDURE — 93005 ELECTROCARDIOGRAM TRACING: CPT | Performed by: STUDENT IN AN ORGANIZED HEALTH CARE EDUCATION/TRAINING PROGRAM

## 2025-08-17 PROCEDURE — 80048 BASIC METABOLIC PNL TOTAL CA: CPT | Performed by: STUDENT IN AN ORGANIZED HEALTH CARE EDUCATION/TRAINING PROGRAM

## 2025-08-17 PROCEDURE — 258N000003 HC RX IP 258 OP 636: Performed by: STUDENT IN AN ORGANIZED HEALTH CARE EDUCATION/TRAINING PROGRAM

## 2025-08-17 PROCEDURE — 84484 ASSAY OF TROPONIN QUANT: CPT | Performed by: STUDENT IN AN ORGANIZED HEALTH CARE EDUCATION/TRAINING PROGRAM

## 2025-08-17 PROCEDURE — 99285 EMERGENCY DEPT VISIT HI MDM: CPT | Mod: 25 | Performed by: STUDENT IN AN ORGANIZED HEALTH CARE EDUCATION/TRAINING PROGRAM

## 2025-08-17 PROCEDURE — 96361 HYDRATE IV INFUSION ADD-ON: CPT

## 2025-08-17 PROCEDURE — 99285 EMERGENCY DEPT VISIT HI MDM: CPT | Mod: 25

## 2025-08-17 PROCEDURE — 85018 HEMOGLOBIN: CPT | Performed by: STUDENT IN AN ORGANIZED HEALTH CARE EDUCATION/TRAINING PROGRAM

## 2025-08-17 PROCEDURE — 87637 SARSCOV2&INF A&B&RSV AMP PRB: CPT | Performed by: STUDENT IN AN ORGANIZED HEALTH CARE EDUCATION/TRAINING PROGRAM

## 2025-08-17 PROCEDURE — 83880 ASSAY OF NATRIURETIC PEPTIDE: CPT | Performed by: STUDENT IN AN ORGANIZED HEALTH CARE EDUCATION/TRAINING PROGRAM

## 2025-08-17 PROCEDURE — 36415 COLL VENOUS BLD VENIPUNCTURE: CPT | Performed by: STUDENT IN AN ORGANIZED HEALTH CARE EDUCATION/TRAINING PROGRAM

## 2025-08-17 PROCEDURE — 71275 CT ANGIOGRAPHY CHEST: CPT

## 2025-08-17 RX ADMIN — SODIUM CHLORIDE 500 ML: 0.9 INJECTION, SOLUTION INTRAVENOUS at 23:54

## 2025-08-17 ASSESSMENT — COLUMBIA-SUICIDE SEVERITY RATING SCALE - C-SSRS
2. HAVE YOU ACTUALLY HAD ANY THOUGHTS OF KILLING YOURSELF IN THE PAST MONTH?: NO
6. HAVE YOU EVER DONE ANYTHING, STARTED TO DO ANYTHING, OR PREPARED TO DO ANYTHING TO END YOUR LIFE?: YES
1. IN THE PAST MONTH, HAVE YOU WISHED YOU WERE DEAD OR WISHED YOU COULD GO TO SLEEP AND NOT WAKE UP?: NO

## 2025-08-17 ASSESSMENT — ACTIVITIES OF DAILY LIVING (ADL): ADLS_ACUITY_SCORE: 58

## 2025-08-18 ENCOUNTER — APPOINTMENT (OUTPATIENT)
Dept: OCCUPATIONAL THERAPY | Facility: HOSPITAL | Age: 65
DRG: 189 | End: 2025-08-18
Attending: STUDENT IN AN ORGANIZED HEALTH CARE EDUCATION/TRAINING PROGRAM
Payer: COMMERCIAL

## 2025-08-18 LAB
ALBUMIN SERPL BCG-MCNC: 3.3 G/DL (ref 3.5–5.2)
ALP SERPL-CCNC: 57 U/L (ref 40–150)
ALT SERPL W P-5'-P-CCNC: 12 U/L (ref 0–70)
ANION GAP SERPL CALCULATED.3IONS-SCNC: 14 MMOL/L (ref 7–15)
AST SERPL W P-5'-P-CCNC: 23 U/L (ref 0–45)
ATRIAL RATE - MUSE: 87 BPM
BILIRUB SERPL-MCNC: 0.6 MG/DL
BUN SERPL-MCNC: 8 MG/DL (ref 8–23)
C PNEUM DNA SPEC QL NAA+PROBE: NOT DETECTED
CALCIUM SERPL-MCNC: 8.5 MG/DL (ref 8.8–10.4)
CHLORIDE SERPL-SCNC: 103 MMOL/L (ref 98–107)
CREAT SERPL-MCNC: 0.58 MG/DL (ref 0.67–1.17)
DIASTOLIC BLOOD PRESSURE - MUSE: NORMAL MMHG
EGFRCR SERPLBLD CKD-EPI 2021: >90 ML/MIN/1.73M2
ERYTHROCYTE [DISTWIDTH] IN BLOOD BY AUTOMATED COUNT: 16.7 % (ref 10–15)
FLUAV H1 2009 PAND RNA SPEC QL NAA+PROBE: NOT DETECTED
FLUAV H1 RNA SPEC QL NAA+PROBE: NOT DETECTED
FLUAV H3 RNA SPEC QL NAA+PROBE: NOT DETECTED
FLUAV RNA SPEC QL NAA+PROBE: NEGATIVE
FLUAV RNA SPEC QL NAA+PROBE: NOT DETECTED
FLUBV RNA RESP QL NAA+PROBE: NEGATIVE
FLUBV RNA SPEC QL NAA+PROBE: NOT DETECTED
GLUCOSE BLDC GLUCOMTR-MCNC: 190 MG/DL (ref 70–99)
GLUCOSE BLDC GLUCOMTR-MCNC: 191 MG/DL (ref 70–99)
GLUCOSE BLDC GLUCOMTR-MCNC: 195 MG/DL (ref 70–99)
GLUCOSE SERPL-MCNC: 186 MG/DL (ref 70–99)
HADV DNA SPEC QL NAA+PROBE: NOT DETECTED
HCO3 SERPL-SCNC: 18 MMOL/L (ref 22–29)
HCOV PNL SPEC NAA+PROBE: NOT DETECTED
HCT VFR BLD AUTO: 29.1 % (ref 40–53)
HGB BLD-MCNC: 8.6 G/DL (ref 13.3–17.7)
HMPV RNA SPEC QL NAA+PROBE: NOT DETECTED
HOLD SPECIMEN: NORMAL
HPIV1 RNA SPEC QL NAA+PROBE: NOT DETECTED
HPIV2 RNA SPEC QL NAA+PROBE: NOT DETECTED
HPIV3 RNA SPEC QL NAA+PROBE: NOT DETECTED
HPIV4 RNA SPEC QL NAA+PROBE: NOT DETECTED
INTERPRETATION ECG - MUSE: NORMAL
L PNEUMO1 AG UR QL IA: NEGATIVE
M PNEUMO DNA SPEC QL NAA+PROBE: NOT DETECTED
MAGNESIUM SERPL-MCNC: 1.7 MG/DL (ref 1.7–2.3)
MAGNESIUM SERPL-MCNC: 3.4 MG/DL (ref 1.7–2.3)
MCH RBC QN AUTO: 26.1 PG (ref 26.5–33)
MCHC RBC AUTO-ENTMCNC: 29.6 G/DL (ref 31.5–36.5)
MCV RBC AUTO: 88.4 FL (ref 78–100)
P AXIS - MUSE: -3 DEGREES
PHOSPHATE SERPL-MCNC: 2.3 MG/DL (ref 2.5–4.5)
PHOSPHATE SERPL-MCNC: 3 MG/DL (ref 2.5–4.5)
PLATELET # BLD AUTO: 444 10E3/UL (ref 150–450)
POTASSIUM SERPL-SCNC: 4.2 MMOL/L (ref 3.4–5.3)
PR INTERVAL - MUSE: 144 MS
PROCALCITONIN SERPL IA-MCNC: 0.12 NG/ML
PROT SERPL-MCNC: 6.1 G/DL (ref 6.4–8.3)
QRS DURATION - MUSE: 78 MS
QT - MUSE: 396 MS
QTC - MUSE: 476 MS
R AXIS - MUSE: -8 DEGREES
RBC # BLD AUTO: 3.29 10E6/UL (ref 4.4–5.9)
RSV RNA SPEC NAA+PROBE: NEGATIVE
RSV RNA SPEC QL NAA+PROBE: NOT DETECTED
RSV RNA SPEC QL NAA+PROBE: NOT DETECTED
RV+EV RNA SPEC QL NAA+PROBE: NOT DETECTED
S PNEUM AG SPEC QL: NEGATIVE
SARS-COV-2 RNA RESP QL NAA+PROBE: NEGATIVE
SODIUM SERPL-SCNC: 135 MMOL/L (ref 135–145)
SPECIMEN TYPE: NORMAL
SYSTOLIC BLOOD PRESSURE - MUSE: NORMAL MMHG
T AXIS - MUSE: 113 DEGREES
TROPONIN T SERPL HS-MCNC: 32 NG/L
TROPONIN T SERPL HS-MCNC: 34 NG/L
VENTRICULAR RATE- MUSE: 87 BPM
WBC # BLD AUTO: 8.65 10E3/UL (ref 4–11)

## 2025-08-18 PROCEDURE — 84100 ASSAY OF PHOSPHORUS: CPT | Performed by: STUDENT IN AN ORGANIZED HEALTH CARE EDUCATION/TRAINING PROGRAM

## 2025-08-18 PROCEDURE — 82607 VITAMIN B-12: CPT | Performed by: INTERNAL MEDICINE

## 2025-08-18 PROCEDURE — 83735 ASSAY OF MAGNESIUM: CPT | Performed by: STUDENT IN AN ORGANIZED HEALTH CARE EDUCATION/TRAINING PROGRAM

## 2025-08-18 PROCEDURE — 258N000003 HC RX IP 258 OP 636: Performed by: STUDENT IN AN ORGANIZED HEALTH CARE EDUCATION/TRAINING PROGRAM

## 2025-08-18 PROCEDURE — 99223 1ST HOSP IP/OBS HIGH 75: CPT | Mod: AI | Performed by: STUDENT IN AN ORGANIZED HEALTH CARE EDUCATION/TRAINING PROGRAM

## 2025-08-18 PROCEDURE — 97166 OT EVAL MOD COMPLEX 45 MIN: CPT | Mod: GO

## 2025-08-18 PROCEDURE — 250N000009 HC RX 250: Performed by: STUDENT IN AN ORGANIZED HEALTH CARE EDUCATION/TRAINING PROGRAM

## 2025-08-18 PROCEDURE — 87899 AGENT NOS ASSAY W/OPTIC: CPT | Performed by: STUDENT IN AN ORGANIZED HEALTH CARE EDUCATION/TRAINING PROGRAM

## 2025-08-18 PROCEDURE — 84484 ASSAY OF TROPONIN QUANT: CPT | Performed by: STUDENT IN AN ORGANIZED HEALTH CARE EDUCATION/TRAINING PROGRAM

## 2025-08-18 PROCEDURE — 94640 AIRWAY INHALATION TREATMENT: CPT | Mod: 76

## 2025-08-18 PROCEDURE — 999N000156 HC STATISTIC RCP CONSULT EA 30 MIN

## 2025-08-18 PROCEDURE — 250N000011 HC RX IP 250 OP 636: Performed by: STUDENT IN AN ORGANIZED HEALTH CARE EDUCATION/TRAINING PROGRAM

## 2025-08-18 PROCEDURE — 84155 ASSAY OF PROTEIN SERUM: CPT | Performed by: STUDENT IN AN ORGANIZED HEALTH CARE EDUCATION/TRAINING PROGRAM

## 2025-08-18 PROCEDURE — 94640 AIRWAY INHALATION TREATMENT: CPT

## 2025-08-18 PROCEDURE — 83540 ASSAY OF IRON: CPT | Performed by: INTERNAL MEDICINE

## 2025-08-18 PROCEDURE — 97530 THERAPEUTIC ACTIVITIES: CPT | Mod: GO

## 2025-08-18 PROCEDURE — 36415 COLL VENOUS BLD VENIPUNCTURE: CPT | Performed by: STUDENT IN AN ORGANIZED HEALTH CARE EDUCATION/TRAINING PROGRAM

## 2025-08-18 PROCEDURE — 250N000012 HC RX MED GY IP 250 OP 636 PS 637: Performed by: STUDENT IN AN ORGANIZED HEALTH CARE EDUCATION/TRAINING PROGRAM

## 2025-08-18 PROCEDURE — 96374 THER/PROPH/DIAG INJ IV PUSH: CPT | Mod: 59

## 2025-08-18 PROCEDURE — 84145 PROCALCITONIN (PCT): CPT | Performed by: INTERNAL MEDICINE

## 2025-08-18 PROCEDURE — 120N000001 HC R&B MED SURG/OB

## 2025-08-18 PROCEDURE — 250N000013 HC RX MED GY IP 250 OP 250 PS 637: Performed by: INTERNAL MEDICINE

## 2025-08-18 PROCEDURE — 250N000013 HC RX MED GY IP 250 OP 250 PS 637: Performed by: STUDENT IN AN ORGANIZED HEALTH CARE EDUCATION/TRAINING PROGRAM

## 2025-08-18 PROCEDURE — 85018 HEMOGLOBIN: CPT | Performed by: STUDENT IN AN ORGANIZED HEALTH CARE EDUCATION/TRAINING PROGRAM

## 2025-08-18 PROCEDURE — 99207 PR NO BILLABLE SERVICE THIS VISIT: CPT | Performed by: INTERNAL MEDICINE

## 2025-08-18 PROCEDURE — 999N000157 HC STATISTIC RCP TIME EA 10 MIN

## 2025-08-18 PROCEDURE — 82962 GLUCOSE BLOOD TEST: CPT

## 2025-08-18 PROCEDURE — 87581 M.PNEUMON DNA AMP PROBE: CPT | Performed by: STUDENT IN AN ORGANIZED HEALTH CARE EDUCATION/TRAINING PROGRAM

## 2025-08-18 RX ORDER — PETROLATUM,WHITE
OINTMENT IN PACKET (GRAM) TOPICAL DAILY PRN
Status: DISCONTINUED | OUTPATIENT
Start: 2025-08-18 | End: 2025-08-22 | Stop reason: HOSPADM

## 2025-08-18 RX ORDER — ROSUVASTATIN CALCIUM 10 MG/1
20 TABLET, COATED ORAL DAILY
Status: DISCONTINUED | OUTPATIENT
Start: 2025-08-18 | End: 2025-08-22 | Stop reason: HOSPADM

## 2025-08-18 RX ORDER — HYDROXYZINE HYDROCHLORIDE 25 MG/1
25 TABLET, FILM COATED ORAL EVERY 6 HOURS PRN
Status: DISCONTINUED | OUTPATIENT
Start: 2025-08-18 | End: 2025-08-18

## 2025-08-18 RX ORDER — ALBUTEROL SULFATE 90 UG/1
2 INHALANT RESPIRATORY (INHALATION) EVERY 6 HOURS PRN
Status: DISCONTINUED | OUTPATIENT
Start: 2025-08-18 | End: 2025-08-22 | Stop reason: HOSPADM

## 2025-08-18 RX ORDER — ONDANSETRON 2 MG/ML
4 INJECTION INTRAMUSCULAR; INTRAVENOUS ONCE
Status: COMPLETED | OUTPATIENT
Start: 2025-08-18 | End: 2025-08-18

## 2025-08-18 RX ORDER — HYDROXYZINE HYDROCHLORIDE 25 MG/1
25 TABLET, FILM COATED ORAL EVERY 6 HOURS PRN
Status: DISCONTINUED | OUTPATIENT
Start: 2025-08-18 | End: 2025-08-22 | Stop reason: HOSPADM

## 2025-08-18 RX ORDER — NITROGLYCERIN 0.4 MG/1
0.4 TABLET SUBLINGUAL EVERY 5 MIN PRN
Status: DISCONTINUED | OUTPATIENT
Start: 2025-08-18 | End: 2025-08-22 | Stop reason: HOSPADM

## 2025-08-18 RX ORDER — BUDESONIDE 0.5 MG/2ML
0.5 INHALANT ORAL 2 TIMES DAILY
Status: DISCONTINUED | OUTPATIENT
Start: 2025-08-18 | End: 2025-08-22 | Stop reason: HOSPADM

## 2025-08-18 RX ORDER — PANTOPRAZOLE SODIUM 40 MG/1
40 TABLET, DELAYED RELEASE ORAL DAILY
Status: DISCONTINUED | OUTPATIENT
Start: 2025-08-18 | End: 2025-08-22 | Stop reason: HOSPADM

## 2025-08-18 RX ORDER — ONDANSETRON 4 MG/1
4 TABLET, ORALLY DISINTEGRATING ORAL EVERY 6 HOURS PRN
Status: DISCONTINUED | OUTPATIENT
Start: 2025-08-18 | End: 2025-08-22 | Stop reason: HOSPADM

## 2025-08-18 RX ORDER — ASPIRIN 81 MG/1
81 TABLET ORAL DAILY
Status: DISCONTINUED | OUTPATIENT
Start: 2025-08-18 | End: 2025-08-22 | Stop reason: HOSPADM

## 2025-08-18 RX ORDER — MAGNESIUM SULFATE 4 G/50ML
4 INJECTION INTRAVENOUS ONCE
Status: COMPLETED | OUTPATIENT
Start: 2025-08-18 | End: 2025-08-18

## 2025-08-18 RX ORDER — FLUTICASONE FUROATE AND VILANTEROL 100; 25 UG/1; UG/1
1 POWDER RESPIRATORY (INHALATION) DAILY
Status: DISCONTINUED | OUTPATIENT
Start: 2025-08-18 | End: 2025-08-18

## 2025-08-18 RX ORDER — PROCHLORPERAZINE MALEATE 5 MG/1
5 TABLET ORAL EVERY 6 HOURS PRN
Status: DISCONTINUED | OUTPATIENT
Start: 2025-08-18 | End: 2025-08-22 | Stop reason: HOSPADM

## 2025-08-18 RX ORDER — MONTELUKAST SODIUM 10 MG/1
10 TABLET ORAL AT BEDTIME
Status: DISCONTINUED | OUTPATIENT
Start: 2025-08-18 | End: 2025-08-22 | Stop reason: HOSPADM

## 2025-08-18 RX ORDER — ACETAMINOPHEN 325 MG/1
650 TABLET ORAL EVERY 4 HOURS PRN
Status: DISCONTINUED | OUTPATIENT
Start: 2025-08-18 | End: 2025-08-22 | Stop reason: HOSPADM

## 2025-08-18 RX ORDER — POLYETHYLENE GLYCOL 3350 17 G/17G
17 POWDER, FOR SOLUTION ORAL DAILY PRN
Status: DISCONTINUED | OUTPATIENT
Start: 2025-08-18 | End: 2025-08-19

## 2025-08-18 RX ORDER — POTASSIUM CHLORIDE 7.45 MG/ML
10 INJECTION INTRAVENOUS
Status: COMPLETED | OUTPATIENT
Start: 2025-08-18 | End: 2025-08-18

## 2025-08-18 RX ORDER — ENTECAVIR 0.5 MG/1
0.5 TABLET, FILM COATED ORAL DAILY
Status: DISCONTINUED | OUTPATIENT
Start: 2025-08-18 | End: 2025-08-22 | Stop reason: HOSPADM

## 2025-08-18 RX ORDER — HYDROXYZINE HYDROCHLORIDE 50 MG/1
50 TABLET, FILM COATED ORAL EVERY 6 HOURS PRN
Status: DISCONTINUED | OUTPATIENT
Start: 2025-08-18 | End: 2025-08-22 | Stop reason: HOSPADM

## 2025-08-18 RX ORDER — ONDANSETRON 2 MG/ML
4 INJECTION INTRAMUSCULAR; INTRAVENOUS EVERY 6 HOURS PRN
Status: DISCONTINUED | OUTPATIENT
Start: 2025-08-18 | End: 2025-08-22 | Stop reason: HOSPADM

## 2025-08-18 RX ORDER — IOPAMIDOL 755 MG/ML
90 INJECTION, SOLUTION INTRAVASCULAR ONCE
Status: COMPLETED | OUTPATIENT
Start: 2025-08-18 | End: 2025-08-18

## 2025-08-18 RX ORDER — IPRATROPIUM BROMIDE AND ALBUTEROL SULFATE 2.5; .5 MG/3ML; MG/3ML
3 SOLUTION RESPIRATORY (INHALATION)
Status: DISCONTINUED | OUTPATIENT
Start: 2025-08-18 | End: 2025-08-22 | Stop reason: HOSPADM

## 2025-08-18 RX ORDER — CODEINE PHOSPHATE AND GUAIFENESIN 10; 100 MG/5ML; MG/5ML
5 SOLUTION ORAL EVERY 4 HOURS PRN
Status: DISCONTINUED | OUTPATIENT
Start: 2025-08-18 | End: 2025-08-19

## 2025-08-18 RX ORDER — CEFTRIAXONE 2 G/1
2 INJECTION, POWDER, FOR SOLUTION INTRAMUSCULAR; INTRAVENOUS EVERY 24 HOURS
Status: DISCONTINUED | OUTPATIENT
Start: 2025-08-19 | End: 2025-08-22 | Stop reason: HOSPADM

## 2025-08-18 RX ORDER — AMOXICILLIN 250 MG
2 CAPSULE ORAL 2 TIMES DAILY PRN
Status: DISCONTINUED | OUTPATIENT
Start: 2025-08-18 | End: 2025-08-22 | Stop reason: HOSPADM

## 2025-08-18 RX ORDER — AMOXICILLIN 250 MG
1 CAPSULE ORAL 2 TIMES DAILY PRN
Status: DISCONTINUED | OUTPATIENT
Start: 2025-08-18 | End: 2025-08-22 | Stop reason: HOSPADM

## 2025-08-18 RX ORDER — CALCIUM CARBONATE 500 MG/1
1000 TABLET, CHEWABLE ORAL 4 TIMES DAILY PRN
Status: DISCONTINUED | OUTPATIENT
Start: 2025-08-18 | End: 2025-08-22 | Stop reason: HOSPADM

## 2025-08-18 RX ORDER — NALTREXONE HYDROCHLORIDE 50 MG/1
50 TABLET, FILM COATED ORAL DAILY
Status: DISCONTINUED | OUTPATIENT
Start: 2025-08-18 | End: 2025-08-22 | Stop reason: HOSPADM

## 2025-08-18 RX ORDER — LATANOPROST 50 UG/ML
1 SOLUTION/ DROPS OPHTHALMIC AT BEDTIME
Status: DISCONTINUED | OUTPATIENT
Start: 2025-08-18 | End: 2025-08-22 | Stop reason: HOSPADM

## 2025-08-18 RX ORDER — METHYLPREDNISOLONE SODIUM SUCCINATE 40 MG/ML
40 INJECTION INTRAMUSCULAR; INTRAVENOUS EVERY 6 HOURS
Status: DISCONTINUED | OUTPATIENT
Start: 2025-08-18 | End: 2025-08-19

## 2025-08-18 RX ORDER — PREDNISONE 5 MG/1
5 TABLET ORAL DAILY
Status: DISCONTINUED | OUTPATIENT
Start: 2025-08-18 | End: 2025-08-22 | Stop reason: HOSPADM

## 2025-08-18 RX ORDER — LIDOCAINE 40 MG/G
CREAM TOPICAL
Status: DISCONTINUED | OUTPATIENT
Start: 2025-08-18 | End: 2025-08-22 | Stop reason: HOSPADM

## 2025-08-18 RX ORDER — CEFTRIAXONE 1 G/1
1 INJECTION, POWDER, FOR SOLUTION INTRAMUSCULAR; INTRAVENOUS EVERY 24 HOURS
Status: DISCONTINUED | OUTPATIENT
Start: 2025-08-18 | End: 2025-08-18

## 2025-08-18 RX ORDER — MYCOPHENOLATE MOFETIL 250 MG/1
750 CAPSULE ORAL 2 TIMES DAILY
Status: DISCONTINUED | OUTPATIENT
Start: 2025-08-18 | End: 2025-08-22 | Stop reason: HOSPADM

## 2025-08-18 RX ORDER — BENZONATATE 100 MG/1
100 CAPSULE ORAL 3 TIMES DAILY
Status: DISCONTINUED | OUTPATIENT
Start: 2025-08-18 | End: 2025-08-22 | Stop reason: HOSPADM

## 2025-08-18 RX ORDER — PROCHLORPERAZINE 25 MG
25 SUPPOSITORY, RECTAL RECTAL EVERY 12 HOURS PRN
Status: DISCONTINUED | OUTPATIENT
Start: 2025-08-18 | End: 2025-08-22 | Stop reason: HOSPADM

## 2025-08-18 RX ADMIN — MONTELUKAST 10 MG: 10 TABLET, FILM COATED ORAL at 21:10

## 2025-08-18 RX ADMIN — METOPROLOL SUCCINATE 12.5 MG: 25 TABLET, EXTENDED RELEASE ORAL at 08:54

## 2025-08-18 RX ADMIN — METHYLPREDNISOLONE SODIUM SUCCINATE 40 MG: 40 INJECTION, POWDER, FOR SOLUTION INTRAMUSCULAR; INTRAVENOUS at 08:54

## 2025-08-18 RX ADMIN — EMPAGLIFLOZIN 10 MG: 10 TABLET, FILM COATED ORAL at 08:54

## 2025-08-18 RX ADMIN — METHYLPREDNISOLONE SODIUM SUCCINATE 40 MG: 40 INJECTION, POWDER, FOR SOLUTION INTRAMUSCULAR; INTRAVENOUS at 15:08

## 2025-08-18 RX ADMIN — ASPIRIN 81 MG: 81 TABLET, COATED ORAL at 08:53

## 2025-08-18 RX ADMIN — LATANOPROST 1 DROP: 50 SOLUTION/ DROPS OPHTHALMIC at 22:23

## 2025-08-18 RX ADMIN — HYDROXYZINE HYDROCHLORIDE 25 MG: 25 TABLET, FILM COATED ORAL at 15:08

## 2025-08-18 RX ADMIN — PROCHLORPERAZINE EDISYLATE 5 MG: 5 INJECTION INTRAMUSCULAR; INTRAVENOUS at 04:52

## 2025-08-18 RX ADMIN — MYCOPHENOLATE MOFETIL 750 MG: 250 CAPSULE ORAL at 22:22

## 2025-08-18 RX ADMIN — MYCOPHENOLATE MOFETIL 750 MG: 250 CAPSULE ORAL at 08:54

## 2025-08-18 RX ADMIN — MONTELUKAST 10 MG: 10 TABLET, FILM COATED ORAL at 03:54

## 2025-08-18 RX ADMIN — AZITHROMYCIN MONOHYDRATE 500 MG: 500 INJECTION, POWDER, LYOPHILIZED, FOR SOLUTION INTRAVENOUS at 04:27

## 2025-08-18 RX ADMIN — ONDANSETRON 4 MG: 2 INJECTION, SOLUTION INTRAMUSCULAR; INTRAVENOUS at 00:31

## 2025-08-18 RX ADMIN — BENZONATATE 100 MG: 100 CAPSULE ORAL at 14:35

## 2025-08-18 RX ADMIN — NALTREXONE HYDROCHLORIDE 50 MG: 50 TABLET, FILM COATED ORAL at 08:54

## 2025-08-18 RX ADMIN — ENTECAVIR 0.5 MG: 0.5 TABLET ORAL at 08:57

## 2025-08-18 RX ADMIN — MAGNESIUM SULFATE HEPTAHYDRATE 4 G: 4 INJECTION, SOLUTION INTRAVENOUS at 04:28

## 2025-08-18 RX ADMIN — POTASSIUM CHLORIDE 10 MEQ: 7.46 INJECTION, SOLUTION INTRAVENOUS at 03:30

## 2025-08-18 RX ADMIN — IPRATROPIUM BROMIDE AND ALBUTEROL SULFATE 3 ML: .5; 3 SOLUTION RESPIRATORY (INHALATION) at 11:57

## 2025-08-18 RX ADMIN — BENZONATATE 100 MG: 100 CAPSULE ORAL at 08:54

## 2025-08-18 RX ADMIN — HYDROXYZINE HYDROCHLORIDE 25 MG: 25 TABLET, FILM COATED ORAL at 22:21

## 2025-08-18 RX ADMIN — IPRATROPIUM BROMIDE AND ALBUTEROL SULFATE 3 ML: .5; 3 SOLUTION RESPIRATORY (INHALATION) at 16:12

## 2025-08-18 RX ADMIN — POTASSIUM CHLORIDE 10 MEQ: 7.46 INJECTION, SOLUTION INTRAVENOUS at 06:18

## 2025-08-18 RX ADMIN — IOPAMIDOL 90 ML: 755 INJECTION, SOLUTION INTRAVENOUS at 00:14

## 2025-08-18 RX ADMIN — HYDROXYZINE HYDROCHLORIDE 25 MG: 25 TABLET, FILM COATED ORAL at 03:54

## 2025-08-18 RX ADMIN — BENZONATATE 100 MG: 100 CAPSULE ORAL at 21:10

## 2025-08-18 RX ADMIN — CEFTRIAXONE SODIUM 1 G: 1 INJECTION, POWDER, FOR SOLUTION INTRAMUSCULAR; INTRAVENOUS at 03:23

## 2025-08-18 RX ADMIN — METHYLPREDNISOLONE SODIUM SUCCINATE 40 MG: 40 INJECTION, POWDER, FOR SOLUTION INTRAMUSCULAR; INTRAVENOUS at 21:10

## 2025-08-18 RX ADMIN — PANTOPRAZOLE SODIUM 40 MG: 20 TABLET, DELAYED RELEASE ORAL at 08:54

## 2025-08-18 RX ADMIN — IPRATROPIUM BROMIDE AND ALBUTEROL SULFATE 3 ML: .5; 3 SOLUTION RESPIRATORY (INHALATION) at 07:42

## 2025-08-18 RX ADMIN — FLUOXETINE HYDROCHLORIDE 60 MG: 20 CAPSULE ORAL at 08:53

## 2025-08-18 RX ADMIN — LATANOPROST 1 DROP: 50 SOLUTION/ DROPS OPHTHALMIC at 03:34

## 2025-08-18 RX ADMIN — ONDANSETRON 4 MG: 2 INJECTION INTRAMUSCULAR; INTRAVENOUS at 03:14

## 2025-08-18 RX ADMIN — POTASSIUM CHLORIDE 10 MEQ: 7.46 INJECTION, SOLUTION INTRAVENOUS at 04:51

## 2025-08-18 RX ADMIN — BUDESONIDE INHALATION 0.5 MG: 0.5 SUSPENSION RESPIRATORY (INHALATION) at 07:42

## 2025-08-18 RX ADMIN — BUDESONIDE INHALATION 0.5 MG: 0.5 SUSPENSION RESPIRATORY (INHALATION) at 20:52

## 2025-08-18 RX ADMIN — METHYLPREDNISOLONE SODIUM SUCCINATE 40 MG: 40 INJECTION, POWDER, FOR SOLUTION INTRAMUSCULAR; INTRAVENOUS at 03:18

## 2025-08-18 RX ADMIN — IPRATROPIUM BROMIDE AND ALBUTEROL SULFATE 3 ML: .5; 3 SOLUTION RESPIRATORY (INHALATION) at 20:52

## 2025-08-18 RX ADMIN — HYDROXYZINE HYDROCHLORIDE 25 MG: 25 TABLET, FILM COATED ORAL at 21:19

## 2025-08-18 RX ADMIN — ROSUVASTATIN CALCIUM 20 MG: 10 TABLET, FILM COATED ORAL at 09:22

## 2025-08-18 ASSESSMENT — ACTIVITIES OF DAILY LIVING (ADL)
ADLS_ACUITY_SCORE: 60
DEPENDENT_IADLS:: INDEPENDENT
ADLS_ACUITY_SCORE: 58
ADLS_ACUITY_SCORE: 60
ADLS_ACUITY_SCORE: 58
ADLS_ACUITY_SCORE: 60
ADLS_ACUITY_SCORE: 58
IADL_COMMENTS: INDEPENDENT AT BASELINE.

## 2025-08-19 ENCOUNTER — APPOINTMENT (OUTPATIENT)
Dept: PHYSICAL THERAPY | Facility: HOSPITAL | Age: 65
DRG: 189 | End: 2025-08-19
Attending: STUDENT IN AN ORGANIZED HEALTH CARE EDUCATION/TRAINING PROGRAM
Payer: COMMERCIAL

## 2025-08-19 ENCOUNTER — APPOINTMENT (OUTPATIENT)
Dept: OCCUPATIONAL THERAPY | Facility: HOSPITAL | Age: 65
DRG: 189 | End: 2025-08-19
Payer: COMMERCIAL

## 2025-08-19 PROBLEM — E83.39 HYPOPHOSPHATEMIA: Status: ACTIVE | Noted: 2025-08-19

## 2025-08-19 PROBLEM — F10.11 ALCOHOL ABUSE, IN REMISSION: Status: ACTIVE | Noted: 2023-08-14

## 2025-08-19 LAB
GLUCOSE BLDC GLUCOMTR-MCNC: 146 MG/DL (ref 70–99)
GLUCOSE BLDC GLUCOMTR-MCNC: 160 MG/DL (ref 70–99)
GLUCOSE BLDC GLUCOMTR-MCNC: 189 MG/DL (ref 70–99)
IRON SERPL-MCNC: 12 UG/DL (ref 61–157)
VIT B12 SERPL-MCNC: 575 PG/ML (ref 232–1245)

## 2025-08-19 PROCEDURE — 258N000003 HC RX IP 258 OP 636: Performed by: STUDENT IN AN ORGANIZED HEALTH CARE EDUCATION/TRAINING PROGRAM

## 2025-08-19 PROCEDURE — 250N000011 HC RX IP 250 OP 636: Performed by: INTERNAL MEDICINE

## 2025-08-19 PROCEDURE — 999N000157 HC STATISTIC RCP TIME EA 10 MIN

## 2025-08-19 PROCEDURE — 94640 AIRWAY INHALATION TREATMENT: CPT | Mod: 76

## 2025-08-19 PROCEDURE — 99223 1ST HOSP IP/OBS HIGH 75: CPT | Performed by: FAMILY MEDICINE

## 2025-08-19 PROCEDURE — 250N000013 HC RX MED GY IP 250 OP 250 PS 637: Performed by: INTERNAL MEDICINE

## 2025-08-19 PROCEDURE — 99233 SBSQ HOSP IP/OBS HIGH 50: CPT | Performed by: INTERNAL MEDICINE

## 2025-08-19 PROCEDURE — 250N000012 HC RX MED GY IP 250 OP 636 PS 637: Performed by: STUDENT IN AN ORGANIZED HEALTH CARE EDUCATION/TRAINING PROGRAM

## 2025-08-19 PROCEDURE — 250N000011 HC RX IP 250 OP 636: Performed by: STUDENT IN AN ORGANIZED HEALTH CARE EDUCATION/TRAINING PROGRAM

## 2025-08-19 PROCEDURE — 97535 SELF CARE MNGMENT TRAINING: CPT | Mod: GO

## 2025-08-19 PROCEDURE — 97110 THERAPEUTIC EXERCISES: CPT | Mod: GP

## 2025-08-19 PROCEDURE — 94640 AIRWAY INHALATION TREATMENT: CPT

## 2025-08-19 PROCEDURE — 94799 UNLISTED PULMONARY SVC/PX: CPT

## 2025-08-19 PROCEDURE — 250N000013 HC RX MED GY IP 250 OP 250 PS 637: Performed by: STUDENT IN AN ORGANIZED HEALTH CARE EDUCATION/TRAINING PROGRAM

## 2025-08-19 PROCEDURE — 250N000009 HC RX 250: Performed by: STUDENT IN AN ORGANIZED HEALTH CARE EDUCATION/TRAINING PROGRAM

## 2025-08-19 PROCEDURE — 97161 PT EVAL LOW COMPLEX 20 MIN: CPT | Mod: GP

## 2025-08-19 PROCEDURE — 120N000001 HC R&B MED SURG/OB

## 2025-08-19 PROCEDURE — 97116 GAIT TRAINING THERAPY: CPT | Mod: GP

## 2025-08-19 RX ORDER — FUROSEMIDE 10 MG/ML
20 INJECTION INTRAMUSCULAR; INTRAVENOUS
Status: COMPLETED | OUTPATIENT
Start: 2025-08-19 | End: 2025-08-19

## 2025-08-19 RX ORDER — BISACODYL 5 MG
5 TABLET, DELAYED RELEASE (ENTERIC COATED) ORAL 2 TIMES DAILY
Status: COMPLETED | OUTPATIENT
Start: 2025-08-19 | End: 2025-08-19

## 2025-08-19 RX ORDER — CODEINE PHOSPHATE AND GUAIFENESIN 10; 100 MG/5ML; MG/5ML
5 SOLUTION ORAL EVERY 4 HOURS PRN
Status: DISCONTINUED | OUTPATIENT
Start: 2025-08-19 | End: 2025-08-22 | Stop reason: HOSPADM

## 2025-08-19 RX ORDER — GUAIFENESIN 200 MG/10ML
100 LIQUID ORAL EVERY 4 HOURS PRN
Status: DISCONTINUED | OUTPATIENT
Start: 2025-08-19 | End: 2025-08-19

## 2025-08-19 RX ORDER — POLYETHYLENE GLYCOL 3350 17 G/17G
17 POWDER, FOR SOLUTION ORAL 2 TIMES DAILY
Status: DISCONTINUED | OUTPATIENT
Start: 2025-08-19 | End: 2025-08-20

## 2025-08-19 RX ORDER — ISOSORBIDE MONONITRATE 30 MG/1
30 TABLET, EXTENDED RELEASE ORAL DAILY
Status: DISCONTINUED | OUTPATIENT
Start: 2025-08-19 | End: 2025-08-22 | Stop reason: HOSPADM

## 2025-08-19 RX ORDER — PREDNISONE 20 MG/1
40 TABLET ORAL DAILY
Status: COMPLETED | OUTPATIENT
Start: 2025-08-20 | End: 2025-08-22

## 2025-08-19 RX ORDER — CODEINE PHOSPHATE AND GUAIFENESIN 10; 100 MG/5ML; MG/5ML
5 SOLUTION ORAL EVERY 4 HOURS PRN
Status: DISCONTINUED | OUTPATIENT
Start: 2025-08-19 | End: 2025-08-19

## 2025-08-19 RX ORDER — ISOSORBIDE MONONITRATE 30 MG/1
30 TABLET, EXTENDED RELEASE ORAL DAILY
Status: DISCONTINUED | OUTPATIENT
Start: 2025-08-19 | End: 2025-08-19

## 2025-08-19 RX ADMIN — CEFTRIAXONE SODIUM 2 G: 2 INJECTION, POWDER, FOR SOLUTION INTRAMUSCULAR; INTRAVENOUS at 06:05

## 2025-08-19 RX ADMIN — IPRATROPIUM BROMIDE AND ALBUTEROL SULFATE 3 ML: .5; 3 SOLUTION RESPIRATORY (INHALATION) at 07:42

## 2025-08-19 RX ADMIN — AZITHROMYCIN MONOHYDRATE 500 MG: 500 INJECTION, POWDER, LYOPHILIZED, FOR SOLUTION INTRAVENOUS at 06:43

## 2025-08-19 RX ADMIN — ACETAMINOPHEN 650 MG: 325 TABLET ORAL at 09:06

## 2025-08-19 RX ADMIN — MONTELUKAST 10 MG: 10 TABLET, FILM COATED ORAL at 20:54

## 2025-08-19 RX ADMIN — IPRATROPIUM BROMIDE AND ALBUTEROL SULFATE 3 ML: .5; 3 SOLUTION RESPIRATORY (INHALATION) at 21:23

## 2025-08-19 RX ADMIN — ROSUVASTATIN CALCIUM 20 MG: 10 TABLET, FILM COATED ORAL at 08:49

## 2025-08-19 RX ADMIN — FLUOXETINE HYDROCHLORIDE 60 MG: 20 CAPSULE ORAL at 08:46

## 2025-08-19 RX ADMIN — PANTOPRAZOLE SODIUM 40 MG: 20 TABLET, DELAYED RELEASE ORAL at 08:49

## 2025-08-19 RX ADMIN — POTASSIUM & SODIUM PHOSPHATES POWDER PACK 280-160-250 MG 2 PACKET: 280-160-250 PACK at 20:57

## 2025-08-19 RX ADMIN — ACETAMINOPHEN 650 MG: 325 TABLET ORAL at 20:52

## 2025-08-19 RX ADMIN — BUDESONIDE INHALATION 0.5 MG: 0.5 SUSPENSION RESPIRATORY (INHALATION) at 21:24

## 2025-08-19 RX ADMIN — POTASSIUM & SODIUM PHOSPHATES POWDER PACK 280-160-250 MG 2 PACKET: 280-160-250 PACK at 09:04

## 2025-08-19 RX ADMIN — ENTECAVIR 0.5 MG: 0.5 TABLET ORAL at 08:42

## 2025-08-19 RX ADMIN — ASPIRIN 81 MG: 81 TABLET, COATED ORAL at 08:49

## 2025-08-19 RX ADMIN — IPRATROPIUM BROMIDE AND ALBUTEROL SULFATE 3 ML: .5; 3 SOLUTION RESPIRATORY (INHALATION) at 11:54

## 2025-08-19 RX ADMIN — BISACODYL 5 MG: 5 TABLET, COATED ORAL at 12:16

## 2025-08-19 RX ADMIN — FUROSEMIDE 20 MG: 10 INJECTION, SOLUTION INTRAMUSCULAR; INTRAVENOUS at 09:09

## 2025-08-19 RX ADMIN — LATANOPROST 1 DROP: 50 SOLUTION/ DROPS OPHTHALMIC at 20:55

## 2025-08-19 RX ADMIN — METHYLPREDNISOLONE SODIUM SUCCINATE 40 MG: 40 INJECTION, POWDER, FOR SOLUTION INTRAMUSCULAR; INTRAVENOUS at 09:09

## 2025-08-19 RX ADMIN — BENZONATATE 100 MG: 100 CAPSULE ORAL at 08:50

## 2025-08-19 RX ADMIN — METOPROLOL SUCCINATE 12.5 MG: 25 TABLET, EXTENDED RELEASE ORAL at 08:47

## 2025-08-19 RX ADMIN — POLYETHYLENE GLYCOL 3350 17 G: 17 POWDER, FOR SOLUTION ORAL at 11:01

## 2025-08-19 RX ADMIN — BENZONATATE 100 MG: 100 CAPSULE ORAL at 15:39

## 2025-08-19 RX ADMIN — ISOSORBIDE MONONITRATE 30 MG: 30 TABLET, EXTENDED RELEASE ORAL at 09:03

## 2025-08-19 RX ADMIN — FUROSEMIDE 20 MG: 10 INJECTION, SOLUTION INTRAMUSCULAR; INTRAVENOUS at 18:37

## 2025-08-19 RX ADMIN — NALTREXONE HYDROCHLORIDE 50 MG: 50 TABLET, FILM COATED ORAL at 08:44

## 2025-08-19 RX ADMIN — HYDROXYZINE HYDROCHLORIDE 50 MG: 50 TABLET ORAL at 09:06

## 2025-08-19 RX ADMIN — METHYLPREDNISOLONE SODIUM SUCCINATE 40 MG: 40 INJECTION, POWDER, FOR SOLUTION INTRAMUSCULAR; INTRAVENOUS at 04:21

## 2025-08-19 RX ADMIN — Medication 3 MG: at 21:12

## 2025-08-19 RX ADMIN — EMPAGLIFLOZIN 10 MG: 10 TABLET, FILM COATED ORAL at 08:48

## 2025-08-19 RX ADMIN — GUAIFENESIN AND CODEINE PHOSPHATE 5 ML: 100; 10 SOLUTION ORAL at 21:12

## 2025-08-19 RX ADMIN — BUDESONIDE INHALATION 0.5 MG: 0.5 SUSPENSION RESPIRATORY (INHALATION) at 07:42

## 2025-08-19 RX ADMIN — MYCOPHENOLATE MOFETIL 750 MG: 250 CAPSULE ORAL at 20:56

## 2025-08-19 RX ADMIN — IPRATROPIUM BROMIDE AND ALBUTEROL SULFATE 3 ML: .5; 3 SOLUTION RESPIRATORY (INHALATION) at 17:03

## 2025-08-19 RX ADMIN — BENZONATATE 100 MG: 100 CAPSULE ORAL at 21:12

## 2025-08-19 RX ADMIN — MYCOPHENOLATE MOFETIL 750 MG: 250 CAPSULE ORAL at 08:41

## 2025-08-19 ASSESSMENT — ACTIVITIES OF DAILY LIVING (ADL)
ADLS_ACUITY_SCORE: 60
ADLS_ACUITY_SCORE: 60
ADLS_ACUITY_SCORE: 64
ADLS_ACUITY_SCORE: 60
ADLS_ACUITY_SCORE: 61
ADLS_ACUITY_SCORE: 61
ADLS_ACUITY_SCORE: 60
ADLS_ACUITY_SCORE: 60
ADLS_ACUITY_SCORE: 64
ADLS_ACUITY_SCORE: 60
ADLS_ACUITY_SCORE: 64
ADLS_ACUITY_SCORE: 60
ADLS_ACUITY_SCORE: 61
ADLS_ACUITY_SCORE: 64
ADLS_ACUITY_SCORE: 64
ADLS_ACUITY_SCORE: 60

## 2025-08-20 ENCOUNTER — APPOINTMENT (OUTPATIENT)
Dept: PHYSICAL THERAPY | Facility: HOSPITAL | Age: 65
DRG: 189 | End: 2025-08-20
Payer: COMMERCIAL

## 2025-08-20 DIAGNOSIS — R06.02 SOB (SHORTNESS OF BREATH): ICD-10-CM

## 2025-08-20 DIAGNOSIS — J44.9 COPD (CHRONIC OBSTRUCTIVE PULMONARY DISEASE) (H): Primary | ICD-10-CM

## 2025-08-20 PROBLEM — D50.9 ANEMIA, IRON DEFICIENCY: Status: ACTIVE | Noted: 2025-08-20

## 2025-08-20 LAB
ATRIAL RATE - MUSE: 71 BPM
DIASTOLIC BLOOD PRESSURE - MUSE: NORMAL MMHG
FOLATE SERPL-MCNC: 11.2 NG/ML (ref 4.6–34.8)
GLUCOSE BLDC GLUCOMTR-MCNC: 143 MG/DL (ref 70–99)
HOLD SPECIMEN: NORMAL
HOLD SPECIMEN: NORMAL
INTERPRETATION ECG - MUSE: NORMAL
MYCOPHENOLATE SERPL LC/MS/MS-MCNC: 0.83 MG/L (ref 1–3.5)
MYCOPHENOLATE-G SERPL LC/MS/MS-MCNC: 59.7 MG/L (ref 30–95)
P AXIS - MUSE: 39 DEGREES
PR INTERVAL - MUSE: 172 MS
QRS DURATION - MUSE: 80 MS
QT - MUSE: 446 MS
QTC - MUSE: 484 MS
R AXIS - MUSE: 46 DEGREES
SYSTOLIC BLOOD PRESSURE - MUSE: NORMAL MMHG
T AXIS - MUSE: 27 DEGREES
TME LAST DOSE: ABNORMAL H
TME LAST DOSE: ABNORMAL H
VENTRICULAR RATE- MUSE: 71 BPM

## 2025-08-20 PROCEDURE — 999N000157 HC STATISTIC RCP TIME EA 10 MIN

## 2025-08-20 PROCEDURE — 97110 THERAPEUTIC EXERCISES: CPT | Mod: GP

## 2025-08-20 PROCEDURE — 258N000003 HC RX IP 258 OP 636: Performed by: STUDENT IN AN ORGANIZED HEALTH CARE EDUCATION/TRAINING PROGRAM

## 2025-08-20 PROCEDURE — 93005 ELECTROCARDIOGRAM TRACING: CPT

## 2025-08-20 PROCEDURE — 250N000012 HC RX MED GY IP 250 OP 636 PS 637: Performed by: INTERNAL MEDICINE

## 2025-08-20 PROCEDURE — 250N000013 HC RX MED GY IP 250 OP 250 PS 637: Performed by: STUDENT IN AN ORGANIZED HEALTH CARE EDUCATION/TRAINING PROGRAM

## 2025-08-20 PROCEDURE — 82746 ASSAY OF FOLIC ACID SERUM: CPT | Performed by: INTERNAL MEDICINE

## 2025-08-20 PROCEDURE — 99232 SBSQ HOSP IP/OBS MODERATE 35: CPT | Performed by: INTERNAL MEDICINE

## 2025-08-20 PROCEDURE — 80321 ALCOHOLS BIOMARKERS 1OR 2: CPT | Performed by: INTERNAL MEDICINE

## 2025-08-20 PROCEDURE — 250N000011 HC RX IP 250 OP 636: Performed by: INTERNAL MEDICINE

## 2025-08-20 PROCEDURE — 85045 AUTOMATED RETICULOCYTE COUNT: CPT | Performed by: STUDENT IN AN ORGANIZED HEALTH CARE EDUCATION/TRAINING PROGRAM

## 2025-08-20 PROCEDURE — 94799 UNLISTED PULMONARY SVC/PX: CPT

## 2025-08-20 PROCEDURE — 250N000013 HC RX MED GY IP 250 OP 250 PS 637: Performed by: INTERNAL MEDICINE

## 2025-08-20 PROCEDURE — 250N000009 HC RX 250: Performed by: STUDENT IN AN ORGANIZED HEALTH CARE EDUCATION/TRAINING PROGRAM

## 2025-08-20 PROCEDURE — 93005 ELECTROCARDIOGRAM TRACING: CPT | Performed by: INTERNAL MEDICINE

## 2025-08-20 PROCEDURE — 94640 AIRWAY INHALATION TREATMENT: CPT

## 2025-08-20 PROCEDURE — 36415 COLL VENOUS BLD VENIPUNCTURE: CPT | Performed by: INTERNAL MEDICINE

## 2025-08-20 PROCEDURE — 120N000001 HC R&B MED SURG/OB

## 2025-08-20 PROCEDURE — 250N000011 HC RX IP 250 OP 636: Performed by: STUDENT IN AN ORGANIZED HEALTH CARE EDUCATION/TRAINING PROGRAM

## 2025-08-20 PROCEDURE — 99222 1ST HOSP IP/OBS MODERATE 55: CPT | Mod: AI | Performed by: INTERNAL MEDICINE

## 2025-08-20 PROCEDURE — 94640 AIRWAY INHALATION TREATMENT: CPT | Mod: 76

## 2025-08-20 PROCEDURE — 80180 DRUG SCRN QUAN MYCOPHENOLATE: CPT | Performed by: INTERNAL MEDICINE

## 2025-08-20 PROCEDURE — 250N000012 HC RX MED GY IP 250 OP 636 PS 637: Performed by: STUDENT IN AN ORGANIZED HEALTH CARE EDUCATION/TRAINING PROGRAM

## 2025-08-20 PROCEDURE — 97116 GAIT TRAINING THERAPY: CPT | Mod: GP

## 2025-08-20 RX ORDER — NALOXONE HYDROCHLORIDE 0.4 MG/ML
0.2 INJECTION, SOLUTION INTRAMUSCULAR; INTRAVENOUS; SUBCUTANEOUS
Status: DISCONTINUED | OUTPATIENT
Start: 2025-08-20 | End: 2025-08-22 | Stop reason: HOSPADM

## 2025-08-20 RX ORDER — NALOXONE HYDROCHLORIDE 0.4 MG/ML
0.4 INJECTION, SOLUTION INTRAMUSCULAR; INTRAVENOUS; SUBCUTANEOUS
Status: DISCONTINUED | OUTPATIENT
Start: 2025-08-20 | End: 2025-08-22 | Stop reason: HOSPADM

## 2025-08-20 RX ORDER — POLYETHYLENE GLYCOL 3350 17 G/17G
17 POWDER, FOR SOLUTION ORAL DAILY
Status: DISCONTINUED | OUTPATIENT
Start: 2025-08-21 | End: 2025-08-22 | Stop reason: HOSPADM

## 2025-08-20 RX ORDER — TRAMADOL HYDROCHLORIDE 50 MG/1
50 TABLET ORAL EVERY 6 HOURS PRN
Status: DISCONTINUED | OUTPATIENT
Start: 2025-08-20 | End: 2025-08-22 | Stop reason: HOSPADM

## 2025-08-20 RX ADMIN — NALTREXONE HYDROCHLORIDE 50 MG: 50 TABLET, FILM COATED ORAL at 08:09

## 2025-08-20 RX ADMIN — IPRATROPIUM BROMIDE AND ALBUTEROL SULFATE 3 ML: .5; 3 SOLUTION RESPIRATORY (INHALATION) at 16:06

## 2025-08-20 RX ADMIN — METOPROLOL SUCCINATE 12.5 MG: 25 TABLET, EXTENDED RELEASE ORAL at 08:06

## 2025-08-20 RX ADMIN — FLUOXETINE HYDROCHLORIDE 60 MG: 20 CAPSULE ORAL at 08:10

## 2025-08-20 RX ADMIN — ROSUVASTATIN CALCIUM 20 MG: 10 TABLET, FILM COATED ORAL at 08:06

## 2025-08-20 RX ADMIN — BENZONATATE 100 MG: 100 CAPSULE ORAL at 14:31

## 2025-08-20 RX ADMIN — BUDESONIDE INHALATION 0.5 MG: 0.5 SUSPENSION RESPIRATORY (INHALATION) at 09:11

## 2025-08-20 RX ADMIN — BENZONATATE 100 MG: 100 CAPSULE ORAL at 21:02

## 2025-08-20 RX ADMIN — ISOSORBIDE MONONITRATE 30 MG: 30 TABLET, EXTENDED RELEASE ORAL at 08:05

## 2025-08-20 RX ADMIN — AZITHROMYCIN MONOHYDRATE 500 MG: 500 INJECTION, POWDER, LYOPHILIZED, FOR SOLUTION INTRAVENOUS at 06:01

## 2025-08-20 RX ADMIN — ASPIRIN 81 MG: 81 TABLET, COATED ORAL at 08:06

## 2025-08-20 RX ADMIN — POTASSIUM & SODIUM PHOSPHATES POWDER PACK 280-160-250 MG 2 PACKET: 280-160-250 PACK at 21:06

## 2025-08-20 RX ADMIN — POTASSIUM & SODIUM PHOSPHATES POWDER PACK 280-160-250 MG 2 PACKET: 280-160-250 PACK at 08:03

## 2025-08-20 RX ADMIN — LATANOPROST 1 DROP: 50 SOLUTION/ DROPS OPHTHALMIC at 21:04

## 2025-08-20 RX ADMIN — IPRATROPIUM BROMIDE AND ALBUTEROL SULFATE 3 ML: .5; 3 SOLUTION RESPIRATORY (INHALATION) at 09:11

## 2025-08-20 RX ADMIN — EMPAGLIFLOZIN 10 MG: 10 TABLET, FILM COATED ORAL at 08:06

## 2025-08-20 RX ADMIN — TRAMADOL HYDROCHLORIDE 50 MG: 50 TABLET, COATED ORAL at 11:11

## 2025-08-20 RX ADMIN — CEFTRIAXONE SODIUM 2 G: 2 INJECTION, POWDER, FOR SOLUTION INTRAMUSCULAR; INTRAVENOUS at 05:25

## 2025-08-20 RX ADMIN — BUDESONIDE INHALATION 0.5 MG: 0.5 SUSPENSION RESPIRATORY (INHALATION) at 21:10

## 2025-08-20 RX ADMIN — IPRATROPIUM BROMIDE AND ALBUTEROL SULFATE 3 ML: .5; 3 SOLUTION RESPIRATORY (INHALATION) at 12:18

## 2025-08-20 RX ADMIN — BENZONATATE 100 MG: 100 CAPSULE ORAL at 08:06

## 2025-08-20 RX ADMIN — PREDNISONE 40 MG: 20 TABLET ORAL at 08:18

## 2025-08-20 RX ADMIN — MYCOPHENOLATE MOFETIL 750 MG: 250 CAPSULE ORAL at 21:02

## 2025-08-20 RX ADMIN — IPRATROPIUM BROMIDE AND ALBUTEROL SULFATE 3 ML: .5; 3 SOLUTION RESPIRATORY (INHALATION) at 21:10

## 2025-08-20 RX ADMIN — PANTOPRAZOLE SODIUM 40 MG: 20 TABLET, DELAYED RELEASE ORAL at 08:06

## 2025-08-20 RX ADMIN — MONTELUKAST 10 MG: 10 TABLET, FILM COATED ORAL at 21:03

## 2025-08-20 RX ADMIN — ENTECAVIR 0.5 MG: 0.5 TABLET ORAL at 08:11

## 2025-08-20 RX ADMIN — ACETAMINOPHEN 650 MG: 325 TABLET ORAL at 06:42

## 2025-08-20 RX ADMIN — Medication 3 MG: at 21:03

## 2025-08-20 RX ADMIN — MYCOPHENOLATE MOFETIL 750 MG: 250 CAPSULE ORAL at 08:12

## 2025-08-20 ASSESSMENT — ACTIVITIES OF DAILY LIVING (ADL)
ADLS_ACUITY_SCORE: 64
ADLS_ACUITY_SCORE: 61
ADLS_ACUITY_SCORE: 64
ADLS_ACUITY_SCORE: 61
ADLS_ACUITY_SCORE: 64

## 2025-08-21 ENCOUNTER — ANESTHESIA (OUTPATIENT)
Dept: SURGERY | Facility: HOSPITAL | Age: 65
End: 2025-08-21
Payer: COMMERCIAL

## 2025-08-21 ENCOUNTER — ANESTHESIA EVENT (OUTPATIENT)
Dept: SURGERY | Facility: HOSPITAL | Age: 65
End: 2025-08-21
Payer: COMMERCIAL

## 2025-08-21 VITALS
BODY MASS INDEX: 24.19 KG/M2 | HEIGHT: 67 IN | RESPIRATION RATE: 21 BRPM | OXYGEN SATURATION: 89 % | HEART RATE: 71 BPM | SYSTOLIC BLOOD PRESSURE: 115 MMHG | WEIGHT: 154.1 LBS | DIASTOLIC BLOOD PRESSURE: 72 MMHG | TEMPERATURE: 98 F

## 2025-08-21 LAB
% LINING CELLS, BODY FLUID: 3 %
ABO + RH BLD: NORMAL
ALBUMIN SERPL BCG-MCNC: 3.3 G/DL (ref 3.5–5.2)
ALP SERPL-CCNC: 48 U/L (ref 40–150)
ALT SERPL W P-5'-P-CCNC: 13 U/L (ref 0–70)
ANION GAP SERPL CALCULATED.3IONS-SCNC: 12 MMOL/L (ref 7–15)
APPEARANCE FLD: ABNORMAL
AST SERPL W P-5'-P-CCNC: 19 U/L (ref 0–45)
BACTERIA SPEC CULT: NORMAL
BILIRUB SERPL-MCNC: 0.3 MG/DL
BLD GP AB SCN SERPL QL: NEGATIVE
BUN SERPL-MCNC: 12.4 MG/DL (ref 8–23)
CALCIUM SERPL-MCNC: 9.2 MG/DL (ref 8.8–10.4)
CHLORIDE SERPL-SCNC: 102 MMOL/L (ref 98–107)
COLOR FLD: COLORLESS
CREAT SERPL-MCNC: 0.61 MG/DL (ref 0.67–1.17)
EGFRCR SERPLBLD CKD-EPI 2021: >90 ML/MIN/1.73M2
ERYTHROCYTE [DISTWIDTH] IN BLOOD BY AUTOMATED COUNT: 16.8 % (ref 10–15)
FERRITIN SERPL-MCNC: 38 NG/ML (ref 31–409)
GLUCOSE SERPL-MCNC: 94 MG/DL (ref 70–99)
GRAM STAIN RESULT: NORMAL
GRAM STAIN RESULT: NORMAL
HCO3 SERPL-SCNC: 24 MMOL/L (ref 22–29)
HCT VFR BLD AUTO: 26.5 % (ref 40–53)
HGB BLD-MCNC: 7.4 G/DL (ref 13.3–17.7)
IRON BINDING CAPACITY (ROCHE): 291 UG/DL (ref 240–430)
IRON SATN MFR SERPL: 4 % (ref 15–46)
IRON SERPL-MCNC: 13 UG/DL (ref 61–157)
KOH PREPARATION: NORMAL
KOH PREPARATION: NORMAL
LYMPHOCYTES NFR FLD MANUAL: 3 %
MCH RBC QN AUTO: 24.7 PG (ref 26.5–33)
MCHC RBC AUTO-ENTMCNC: 27.9 G/DL (ref 31.5–36.5)
MCV RBC AUTO: 88.3 FL (ref 78–100)
MONOS+MACROS NFR FLD MANUAL: 90 %
NEUTS BAND NFR FLD MANUAL: 4 %
PLATELET # BLD AUTO: 440 10E3/UL (ref 150–450)
POTASSIUM SERPL-SCNC: 3.9 MMOL/L (ref 3.4–5.3)
PROT SERPL-MCNC: 5.8 G/DL (ref 6.4–8.3)
RBC # BLD AUTO: 3 10E6/UL (ref 4.4–5.9)
RETICS # AUTO: 0.08 10E6/UL (ref 0.03–0.1)
RETICS/RBC NFR AUTO: 2.91 % (ref 0.5–2)
SODIUM SERPL-SCNC: 138 MMOL/L (ref 135–145)
SPECIMEN EXP DATE BLD: NORMAL
SPECIMEN SOURCE FLD: ABNORMAL
WBC # BLD AUTO: 6.89 10E3/UL (ref 4–11)
WBC # FLD AUTO: 45 /UL

## 2025-08-21 PROCEDURE — 94799 UNLISTED PULMONARY SVC/PX: CPT

## 2025-08-21 PROCEDURE — 87210 SMEAR WET MOUNT SALINE/INK: CPT | Performed by: INTERNAL MEDICINE

## 2025-08-21 PROCEDURE — 250N000013 HC RX MED GY IP 250 OP 250 PS 637: Performed by: STUDENT IN AN ORGANIZED HEALTH CARE EDUCATION/TRAINING PROGRAM

## 2025-08-21 PROCEDURE — 87799 DETECT AGENT NOS DNA QUANT: CPT | Performed by: INTERNAL MEDICINE

## 2025-08-21 PROCEDURE — 87081 CULTURE SCREEN ONLY: CPT | Performed by: INTERNAL MEDICINE

## 2025-08-21 PROCEDURE — 250N000012 HC RX MED GY IP 250 OP 636 PS 637: Performed by: INTERNAL MEDICINE

## 2025-08-21 PROCEDURE — 94640 AIRWAY INHALATION TREATMENT: CPT

## 2025-08-21 PROCEDURE — 82728 ASSAY OF FERRITIN: CPT | Performed by: STUDENT IN AN ORGANIZED HEALTH CARE EDUCATION/TRAINING PROGRAM

## 2025-08-21 PROCEDURE — 999N000141 HC STATISTIC PRE-PROCEDURE NURSING ASSESSMENT: Performed by: INTERNAL MEDICINE

## 2025-08-21 PROCEDURE — 250N000011 HC RX IP 250 OP 636: Performed by: STUDENT IN AN ORGANIZED HEALTH CARE EDUCATION/TRAINING PROGRAM

## 2025-08-21 PROCEDURE — 120N000001 HC R&B MED SURG/OB

## 2025-08-21 PROCEDURE — 87102 FUNGUS ISOLATION CULTURE: CPT | Performed by: INTERNAL MEDICINE

## 2025-08-21 PROCEDURE — 87252 VIRUS INOCULATION TISSUE: CPT | Performed by: INTERNAL MEDICINE

## 2025-08-21 PROCEDURE — 250N000011 HC RX IP 250 OP 636: Performed by: NURSE ANESTHETIST, CERTIFIED REGISTERED

## 2025-08-21 PROCEDURE — 87070 CULTURE OTHR SPECIMN AEROBIC: CPT | Performed by: INTERNAL MEDICINE

## 2025-08-21 PROCEDURE — 87305 ASPERGILLUS AG IA: CPT | Performed by: INTERNAL MEDICINE

## 2025-08-21 PROCEDURE — 258N000003 HC RX IP 258 OP 636: Performed by: NURSE ANESTHETIST, CERTIFIED REGISTERED

## 2025-08-21 PROCEDURE — 80053 COMPREHEN METABOLIC PANEL: CPT | Performed by: INTERNAL MEDICINE

## 2025-08-21 PROCEDURE — 360N000076 HC SURGERY LEVEL 3, PER MIN: Performed by: INTERNAL MEDICINE

## 2025-08-21 PROCEDURE — 86900 BLOOD TYPING SEROLOGIC ABO: CPT | Performed by: ANESTHESIOLOGY

## 2025-08-21 PROCEDURE — 0B9C8ZX DRAINAGE OF RIGHT UPPER LUNG LOBE, VIA NATURAL OR ARTIFICIAL OPENING ENDOSCOPIC, DIAGNOSTIC: ICD-10-PCS | Performed by: INTERNAL MEDICINE

## 2025-08-21 PROCEDURE — 999N000156 HC STATISTIC RCP CONSULT EA 30 MIN

## 2025-08-21 PROCEDURE — 999N000289 HC STATISTIC BRONCHOSCOPY ASST

## 2025-08-21 PROCEDURE — 87798 DETECT AGENT NOS DNA AMP: CPT | Performed by: INTERNAL MEDICINE

## 2025-08-21 PROCEDURE — 370N000017 HC ANESTHESIA TECHNICAL FEE, PER MIN: Performed by: INTERNAL MEDICINE

## 2025-08-21 PROCEDURE — 250N000013 HC RX MED GY IP 250 OP 250 PS 637: Performed by: INTERNAL MEDICINE

## 2025-08-21 PROCEDURE — 89050 BODY FLUID CELL COUNT: CPT | Performed by: INTERNAL MEDICINE

## 2025-08-21 PROCEDURE — 250N000009 HC RX 250: Performed by: INTERNAL MEDICINE

## 2025-08-21 PROCEDURE — 250N000011 HC RX IP 250 OP 636: Performed by: INTERNAL MEDICINE

## 2025-08-21 PROCEDURE — 87205 SMEAR GRAM STAIN: CPT | Performed by: INTERNAL MEDICINE

## 2025-08-21 PROCEDURE — 99233 SBSQ HOSP IP/OBS HIGH 50: CPT | Mod: 25 | Performed by: INTERNAL MEDICINE

## 2025-08-21 PROCEDURE — 258N000003 HC RX IP 258 OP 636: Performed by: STUDENT IN AN ORGANIZED HEALTH CARE EDUCATION/TRAINING PROGRAM

## 2025-08-21 PROCEDURE — 87594 PNEUMCYSTS JIROVECII AMP PRB: CPT | Performed by: INTERNAL MEDICINE

## 2025-08-21 PROCEDURE — 272N000001 HC OR GENERAL SUPPLY STERILE: Performed by: INTERNAL MEDICINE

## 2025-08-21 PROCEDURE — 999N000157 HC STATISTIC RCP TIME EA 10 MIN

## 2025-08-21 PROCEDURE — 99232 SBSQ HOSP IP/OBS MODERATE 35: CPT | Performed by: INTERNAL MEDICINE

## 2025-08-21 PROCEDURE — 31624 DX BRONCHOSCOPE/LAVAGE: CPT | Performed by: INTERNAL MEDICINE

## 2025-08-21 PROCEDURE — 83550 IRON BINDING TEST: CPT | Performed by: STUDENT IN AN ORGANIZED HEALTH CARE EDUCATION/TRAINING PROGRAM

## 2025-08-21 PROCEDURE — 250N000012 HC RX MED GY IP 250 OP 636 PS 637: Performed by: STUDENT IN AN ORGANIZED HEALTH CARE EDUCATION/TRAINING PROGRAM

## 2025-08-21 PROCEDURE — 36415 COLL VENOUS BLD VENIPUNCTURE: CPT | Performed by: INTERNAL MEDICINE

## 2025-08-21 PROCEDURE — 250N000009 HC RX 250: Performed by: NURSE ANESTHETIST, CERTIFIED REGISTERED

## 2025-08-21 PROCEDURE — 85027 COMPLETE CBC AUTOMATED: CPT | Performed by: INTERNAL MEDICINE

## 2025-08-21 PROCEDURE — 710N000010 HC RECOVERY PHASE 1, LEVEL 2, PER MIN: Performed by: INTERNAL MEDICINE

## 2025-08-21 PROCEDURE — 88108 CYTOPATH CONCENTRATE TECH: CPT | Mod: TC | Performed by: INTERNAL MEDICINE

## 2025-08-21 PROCEDURE — 87496 CYTOMEG DNA AMP PROBE: CPT | Performed by: INTERNAL MEDICINE

## 2025-08-21 PROCEDURE — 250N000009 HC RX 250: Performed by: STUDENT IN AN ORGANIZED HEALTH CARE EDUCATION/TRAINING PROGRAM

## 2025-08-21 PROCEDURE — 94640 AIRWAY INHALATION TREATMENT: CPT | Mod: 76

## 2025-08-21 PROCEDURE — 87206 SMEAR FLUORESCENT/ACID STAI: CPT | Performed by: INTERNAL MEDICINE

## 2025-08-21 RX ORDER — FENTANYL CITRATE 50 UG/ML
50 INJECTION, SOLUTION INTRAMUSCULAR; INTRAVENOUS EVERY 5 MIN PRN
Status: DISCONTINUED | OUTPATIENT
Start: 2025-08-21 | End: 2025-08-21 | Stop reason: HOSPADM

## 2025-08-21 RX ORDER — FENTANYL CITRATE 50 UG/ML
25 INJECTION, SOLUTION INTRAMUSCULAR; INTRAVENOUS EVERY 5 MIN PRN
Status: DISCONTINUED | OUTPATIENT
Start: 2025-08-21 | End: 2025-08-21 | Stop reason: HOSPADM

## 2025-08-21 RX ORDER — HYDROMORPHONE HCL IN WATER/PF 6 MG/30 ML
0.2 PATIENT CONTROLLED ANALGESIA SYRINGE INTRAVENOUS EVERY 5 MIN PRN
Status: DISCONTINUED | OUTPATIENT
Start: 2025-08-21 | End: 2025-08-21 | Stop reason: HOSPADM

## 2025-08-21 RX ORDER — NALOXONE HYDROCHLORIDE 0.4 MG/ML
0.1 INJECTION, SOLUTION INTRAMUSCULAR; INTRAVENOUS; SUBCUTANEOUS
Status: DISCONTINUED | OUTPATIENT
Start: 2025-08-21 | End: 2025-08-21 | Stop reason: HOSPADM

## 2025-08-21 RX ORDER — HYDROMORPHONE HCL IN WATER/PF 6 MG/30 ML
0.4 PATIENT CONTROLLED ANALGESIA SYRINGE INTRAVENOUS EVERY 5 MIN PRN
Status: DISCONTINUED | OUTPATIENT
Start: 2025-08-21 | End: 2025-08-21 | Stop reason: HOSPADM

## 2025-08-21 RX ORDER — LIDOCAINE 40 MG/G
CREAM TOPICAL
Status: DISCONTINUED | OUTPATIENT
Start: 2025-08-21 | End: 2025-08-21 | Stop reason: HOSPADM

## 2025-08-21 RX ORDER — DEXAMETHASONE SODIUM PHOSPHATE 4 MG/ML
4 INJECTION, SOLUTION INTRA-ARTICULAR; INTRALESIONAL; INTRAMUSCULAR; INTRAVENOUS; SOFT TISSUE
Status: DISCONTINUED | OUTPATIENT
Start: 2025-08-21 | End: 2025-08-21 | Stop reason: HOSPADM

## 2025-08-21 RX ORDER — ONDANSETRON 4 MG/1
4 TABLET, ORALLY DISINTEGRATING ORAL EVERY 30 MIN PRN
Status: DISCONTINUED | OUTPATIENT
Start: 2025-08-21 | End: 2025-08-21 | Stop reason: HOSPADM

## 2025-08-21 RX ORDER — SODIUM CHLORIDE, SODIUM LACTATE, POTASSIUM CHLORIDE, CALCIUM CHLORIDE 600; 310; 30; 20 MG/100ML; MG/100ML; MG/100ML; MG/100ML
INJECTION, SOLUTION INTRAVENOUS CONTINUOUS PRN
Status: DISCONTINUED | OUTPATIENT
Start: 2025-08-21 | End: 2025-08-21

## 2025-08-21 RX ORDER — PROPOFOL 10 MG/ML
INJECTION, EMULSION INTRAVENOUS PRN
Status: DISCONTINUED | OUTPATIENT
Start: 2025-08-21 | End: 2025-08-21

## 2025-08-21 RX ORDER — SODIUM CHLORIDE, SODIUM LACTATE, POTASSIUM CHLORIDE, CALCIUM CHLORIDE 600; 310; 30; 20 MG/100ML; MG/100ML; MG/100ML; MG/100ML
INJECTION, SOLUTION INTRAVENOUS CONTINUOUS
Status: DISCONTINUED | OUTPATIENT
Start: 2025-08-21 | End: 2025-08-21 | Stop reason: HOSPADM

## 2025-08-21 RX ORDER — FENTANYL CITRATE 50 UG/ML
INJECTION, SOLUTION INTRAMUSCULAR; INTRAVENOUS PRN
Status: DISCONTINUED | OUTPATIENT
Start: 2025-08-21 | End: 2025-08-21

## 2025-08-21 RX ORDER — ONDANSETRON 2 MG/ML
4 INJECTION INTRAMUSCULAR; INTRAVENOUS EVERY 30 MIN PRN
Status: DISCONTINUED | OUTPATIENT
Start: 2025-08-21 | End: 2025-08-21 | Stop reason: HOSPADM

## 2025-08-21 RX ORDER — FLUMAZENIL 0.1 MG/ML
0.2 INJECTION, SOLUTION INTRAVENOUS
Status: ACTIVE | OUTPATIENT
Start: 2025-08-21 | End: 2025-08-22

## 2025-08-21 RX ORDER — MAGNESIUM HYDROXIDE 1200 MG/15ML
LIQUID ORAL PRN
Status: DISCONTINUED | OUTPATIENT
Start: 2025-08-21 | End: 2025-08-21 | Stop reason: HOSPADM

## 2025-08-21 RX ADMIN — ENTECAVIR 0.5 MG: 0.5 TABLET ORAL at 08:36

## 2025-08-21 RX ADMIN — BENZONATATE 100 MG: 100 CAPSULE ORAL at 15:09

## 2025-08-21 RX ADMIN — NALTREXONE HYDROCHLORIDE 50 MG: 50 TABLET, FILM COATED ORAL at 15:03

## 2025-08-21 RX ADMIN — BENZONATATE 100 MG: 100 CAPSULE ORAL at 08:40

## 2025-08-21 RX ADMIN — Medication 100 MG: at 11:50

## 2025-08-21 RX ADMIN — MYCOPHENOLATE MOFETIL 750 MG: 250 CAPSULE ORAL at 20:08

## 2025-08-21 RX ADMIN — FENTANYL CITRATE 50 MCG: 50 INJECTION, SOLUTION INTRAMUSCULAR; INTRAVENOUS at 11:44

## 2025-08-21 RX ADMIN — BUDESONIDE INHALATION 0.5 MG: 0.5 SUSPENSION RESPIRATORY (INHALATION) at 09:06

## 2025-08-21 RX ADMIN — MONTELUKAST 10 MG: 10 TABLET, FILM COATED ORAL at 21:31

## 2025-08-21 RX ADMIN — AZITHROMYCIN MONOHYDRATE 500 MG: 500 INJECTION, POWDER, LYOPHILIZED, FOR SOLUTION INTRAVENOUS at 06:48

## 2025-08-21 RX ADMIN — ASPIRIN 81 MG: 81 TABLET, COATED ORAL at 15:03

## 2025-08-21 RX ADMIN — ROCURONIUM 5 MG: 50 INJECTION, SOLUTION INTRAVENOUS at 11:49

## 2025-08-21 RX ADMIN — HYDROXYZINE HYDROCHLORIDE 50 MG: 50 TABLET ORAL at 05:05

## 2025-08-21 RX ADMIN — PANTOPRAZOLE SODIUM 40 MG: 20 TABLET, DELAYED RELEASE ORAL at 08:37

## 2025-08-21 RX ADMIN — BUDESONIDE INHALATION 0.5 MG: 0.5 SUSPENSION RESPIRATORY (INHALATION) at 21:13

## 2025-08-21 RX ADMIN — GUAIFENESIN AND CODEINE PHOSPHATE 5 ML: 100; 10 SOLUTION ORAL at 21:31

## 2025-08-21 RX ADMIN — ONDANSETRON 4 MG: 2 INJECTION INTRAMUSCULAR; INTRAVENOUS at 12:02

## 2025-08-21 RX ADMIN — PROPOFOL 150 MCG/KG/MIN: 10 INJECTION, EMULSION INTRAVENOUS at 11:51

## 2025-08-21 RX ADMIN — BENZONATATE 100 MG: 100 CAPSULE ORAL at 20:08

## 2025-08-21 RX ADMIN — ROSUVASTATIN CALCIUM 20 MG: 10 TABLET, FILM COATED ORAL at 15:02

## 2025-08-21 RX ADMIN — LATANOPROST 1 DROP: 50 SOLUTION/ DROPS OPHTHALMIC at 21:44

## 2025-08-21 RX ADMIN — LIDOCAINE HYDROCHLORIDE 50 MG: 10 INJECTION, SOLUTION INFILTRATION; PERINEURAL at 11:50

## 2025-08-21 RX ADMIN — MYCOPHENOLATE MOFETIL 750 MG: 250 CAPSULE ORAL at 08:38

## 2025-08-21 RX ADMIN — METOPROLOL SUCCINATE 12.5 MG: 25 TABLET, EXTENDED RELEASE ORAL at 08:38

## 2025-08-21 RX ADMIN — IPRATROPIUM BROMIDE AND ALBUTEROL SULFATE 3 ML: .5; 3 SOLUTION RESPIRATORY (INHALATION) at 21:13

## 2025-08-21 RX ADMIN — ISOSORBIDE MONONITRATE 30 MG: 30 TABLET, EXTENDED RELEASE ORAL at 08:40

## 2025-08-21 RX ADMIN — PROPOFOL 160 MG: 10 INJECTION, EMULSION INTRAVENOUS at 11:50

## 2025-08-21 RX ADMIN — EMPAGLIFLOZIN 10 MG: 10 TABLET, FILM COATED ORAL at 15:03

## 2025-08-21 RX ADMIN — IPRATROPIUM BROMIDE AND ALBUTEROL SULFATE 3 ML: .5; 3 SOLUTION RESPIRATORY (INHALATION) at 09:05

## 2025-08-21 RX ADMIN — FLUOXETINE HYDROCHLORIDE 60 MG: 20 CAPSULE ORAL at 08:39

## 2025-08-21 RX ADMIN — GUAIFENESIN AND CODEINE PHOSPHATE 5 ML: 100; 10 SOLUTION ORAL at 17:11

## 2025-08-21 RX ADMIN — CEFTRIAXONE SODIUM 2 G: 2 INJECTION, POWDER, FOR SOLUTION INTRAMUSCULAR; INTRAVENOUS at 06:13

## 2025-08-21 RX ADMIN — PREDNISONE 40 MG: 20 TABLET ORAL at 15:04

## 2025-08-21 RX ADMIN — POLYETHYLENE GLYCOL 3350 17 G: 17 POWDER, FOR SOLUTION ORAL at 15:02

## 2025-08-21 RX ADMIN — SODIUM CHLORIDE, SODIUM LACTATE, POTASSIUM CHLORIDE, AND CALCIUM CHLORIDE: .6; .31; .03; .02 INJECTION, SOLUTION INTRAVENOUS at 11:35

## 2025-08-21 ASSESSMENT — ACTIVITIES OF DAILY LIVING (ADL)
ADLS_ACUITY_SCORE: 50
ADLS_ACUITY_SCORE: 50
ADLS_ACUITY_SCORE: 61
ADLS_ACUITY_SCORE: 61
ADLS_ACUITY_SCORE: 50
ADLS_ACUITY_SCORE: 50
ADLS_ACUITY_SCORE: 61
ADLS_ACUITY_SCORE: 61
ADLS_ACUITY_SCORE: 50
ADLS_ACUITY_SCORE: 50
ADLS_ACUITY_SCORE: 61
ADLS_ACUITY_SCORE: 50
ADLS_ACUITY_SCORE: 61
ADLS_ACUITY_SCORE: 50
ADLS_ACUITY_SCORE: 61
ADLS_ACUITY_SCORE: 50
ADLS_ACUITY_SCORE: 61
ADLS_ACUITY_SCORE: 50
ADLS_ACUITY_SCORE: 61
ADLS_ACUITY_SCORE: 61
ADLS_ACUITY_SCORE: 50

## 2025-08-21 ASSESSMENT — COPD QUESTIONNAIRES: COPD: 1

## 2025-08-22 VITALS
HEART RATE: 85 BPM | RESPIRATION RATE: 18 BRPM | SYSTOLIC BLOOD PRESSURE: 117 MMHG | WEIGHT: 154.1 LBS | HEIGHT: 67 IN | OXYGEN SATURATION: 95 % | BODY MASS INDEX: 24.19 KG/M2 | TEMPERATURE: 98.1 F | DIASTOLIC BLOOD PRESSURE: 62 MMHG

## 2025-08-22 LAB
ANION GAP SERPL CALCULATED.3IONS-SCNC: 11 MMOL/L (ref 7–15)
BACTERIA BRONCH: NORMAL
BASOPHILS # BLD AUTO: <0.03 10E3/UL (ref 0–0.2)
BASOPHILS NFR BLD AUTO: 0 %
BUN SERPL-MCNC: 11.5 MG/DL (ref 8–23)
CALCIUM SERPL-MCNC: 9.2 MG/DL (ref 8.8–10.4)
CHLORIDE SERPL-SCNC: 103 MMOL/L (ref 98–107)
CREAT SERPL-MCNC: 0.54 MG/DL (ref 0.67–1.17)
DACRYOCYTES BLD QL SMEAR: SLIGHT
EGFRCR SERPLBLD CKD-EPI 2021: >90 ML/MIN/1.73M2
ELLIPTOCYTES BLD QL SMEAR: SLIGHT
EOSINOPHIL # BLD AUTO: <0.03 10E3/UL (ref 0–0.7)
EOSINOPHIL NFR BLD AUTO: 0 %
ERYTHROCYTE [DISTWIDTH] IN BLOOD BY AUTOMATED COUNT: 16.8 % (ref 10–15)
GLUCOSE SERPL-MCNC: 99 MG/DL (ref 70–99)
HCO3 SERPL-SCNC: 24 MMOL/L (ref 22–29)
HCT VFR BLD AUTO: 28.2 % (ref 40–53)
HGB BLD-MCNC: 8.2 G/DL (ref 13.3–17.7)
IMM GRANULOCYTES # BLD: 0.03 10E3/UL
IMM GRANULOCYTES NFR BLD: 0.5 %
LABORATORY REPORT: NORMAL
LYMPHOCYTES # BLD AUTO: 0.37 10E3/UL (ref 0.8–5.3)
LYMPHOCYTES NFR BLD AUTO: 6.7 %
MAGNESIUM SERPL-MCNC: 2 MG/DL (ref 1.7–2.3)
MCH RBC QN AUTO: 25.2 PG (ref 26.5–33)
MCHC RBC AUTO-ENTMCNC: 29.1 G/DL (ref 31.5–36.5)
MCV RBC AUTO: 86.8 FL (ref 78–100)
MONOCYTES # BLD AUTO: 0.23 10E3/UL (ref 0–1.3)
MONOCYTES NFR BLD AUTO: 4.1 %
NEUTROPHILS # BLD AUTO: 4.92 10E3/UL (ref 1.6–8.3)
NEUTROPHILS NFR BLD AUTO: 88.7 %
NRBC # BLD AUTO: 0.03 10E3/UL
NRBC BLD AUTO-RTO: 0.5 /100
PETH INTERPRETATION: NORMAL
PHOSPHATE SERPL-MCNC: 3.5 MG/DL (ref 2.5–4.5)
PLAT MORPH BLD: ABNORMAL
PLATELET # BLD AUTO: 457 10E3/UL (ref 150–450)
PLPETH BLD-MCNC: <10 NG/ML
POLYCHROMASIA BLD QL SMEAR: SLIGHT
POPETH BLD-MCNC: <10 NG/ML
POTASSIUM SERPL-SCNC: 4.3 MMOL/L (ref 3.4–5.3)
PROT SERPL-MCNC: 5.6 G/DL (ref 6.4–8.3)
RBC # BLD AUTO: 3.25 10E6/UL (ref 4.4–5.9)
RBC MORPH BLD: ABNORMAL
RETICS # AUTO: 0.04 10E6/UL (ref 0.03–0.1)
RETICS/RBC NFR AUTO: 2.15 % (ref 0.5–2)
SODIUM SERPL-SCNC: 138 MMOL/L (ref 135–145)
TARGETS BLD QL SMEAR: SLIGHT
WBC # BLD AUTO: 5.55 10E3/UL (ref 4–11)

## 2025-08-22 PROCEDURE — 250N000012 HC RX MED GY IP 250 OP 636 PS 637: Performed by: INTERNAL MEDICINE

## 2025-08-22 PROCEDURE — 84155 ASSAY OF PROTEIN SERUM: CPT | Performed by: STUDENT IN AN ORGANIZED HEALTH CARE EDUCATION/TRAINING PROGRAM

## 2025-08-22 PROCEDURE — 250N000011 HC RX IP 250 OP 636: Performed by: INTERNAL MEDICINE

## 2025-08-22 PROCEDURE — 85045 AUTOMATED RETICULOCYTE COUNT: CPT | Performed by: STUDENT IN AN ORGANIZED HEALTH CARE EDUCATION/TRAINING PROGRAM

## 2025-08-22 PROCEDURE — 83735 ASSAY OF MAGNESIUM: CPT | Performed by: STUDENT IN AN ORGANIZED HEALTH CARE EDUCATION/TRAINING PROGRAM

## 2025-08-22 PROCEDURE — 84100 ASSAY OF PHOSPHORUS: CPT | Performed by: STUDENT IN AN ORGANIZED HEALTH CARE EDUCATION/TRAINING PROGRAM

## 2025-08-22 PROCEDURE — 84165 PROTEIN E-PHORESIS SERUM: CPT | Mod: TC | Performed by: PATHOLOGY

## 2025-08-22 PROCEDURE — 250N000013 HC RX MED GY IP 250 OP 250 PS 637: Performed by: INTERNAL MEDICINE

## 2025-08-22 PROCEDURE — 999N000157 HC STATISTIC RCP TIME EA 10 MIN

## 2025-08-22 PROCEDURE — 80048 BASIC METABOLIC PNL TOTAL CA: CPT | Performed by: STUDENT IN AN ORGANIZED HEALTH CARE EDUCATION/TRAINING PROGRAM

## 2025-08-22 PROCEDURE — 85004 AUTOMATED DIFF WBC COUNT: CPT | Performed by: STUDENT IN AN ORGANIZED HEALTH CARE EDUCATION/TRAINING PROGRAM

## 2025-08-22 PROCEDURE — 94640 AIRWAY INHALATION TREATMENT: CPT

## 2025-08-22 PROCEDURE — 250N000009 HC RX 250: Performed by: STUDENT IN AN ORGANIZED HEALTH CARE EDUCATION/TRAINING PROGRAM

## 2025-08-22 PROCEDURE — 250N000013 HC RX MED GY IP 250 OP 250 PS 637: Performed by: STUDENT IN AN ORGANIZED HEALTH CARE EDUCATION/TRAINING PROGRAM

## 2025-08-22 PROCEDURE — 83521 IG LIGHT CHAINS FREE EACH: CPT | Performed by: STUDENT IN AN ORGANIZED HEALTH CARE EDUCATION/TRAINING PROGRAM

## 2025-08-22 PROCEDURE — 99239 HOSP IP/OBS DSCHRG MGMT >30: CPT | Performed by: INTERNAL MEDICINE

## 2025-08-22 PROCEDURE — 250N000012 HC RX MED GY IP 250 OP 636 PS 637: Performed by: STUDENT IN AN ORGANIZED HEALTH CARE EDUCATION/TRAINING PROGRAM

## 2025-08-22 PROCEDURE — 36415 COLL VENOUS BLD VENIPUNCTURE: CPT | Performed by: STUDENT IN AN ORGANIZED HEALTH CARE EDUCATION/TRAINING PROGRAM

## 2025-08-22 PROCEDURE — 99232 SBSQ HOSP IP/OBS MODERATE 35: CPT | Performed by: STUDENT IN AN ORGANIZED HEALTH CARE EDUCATION/TRAINING PROGRAM

## 2025-08-22 RX ORDER — TRAMADOL HYDROCHLORIDE 50 MG/1
50 TABLET ORAL EVERY 6 HOURS PRN
Qty: 20 TABLET | Refills: 0 | Status: ON HOLD | OUTPATIENT
Start: 2025-08-22 | End: 2025-08-26

## 2025-08-22 RX ORDER — CEFDINIR 300 MG/1
300 CAPSULE ORAL 2 TIMES DAILY
Qty: 14 CAPSULE | Refills: 0 | Status: ON HOLD | OUTPATIENT
Start: 2025-08-22 | End: 2025-08-26

## 2025-08-22 RX ORDER — AMOXICILLIN 250 MG
1 CAPSULE ORAL 2 TIMES DAILY PRN
Status: SHIPPED
Start: 2025-08-22

## 2025-08-22 RX ORDER — NALTREXONE HYDROCHLORIDE 50 MG/1
50 TABLET, FILM COATED ORAL DAILY
Qty: 30 TABLET | Refills: 0 | Status: SHIPPED | OUTPATIENT
Start: 2025-08-22 | End: 2025-09-21

## 2025-08-22 RX ORDER — HYDROXYZINE HYDROCHLORIDE 25 MG/1
25 TABLET, FILM COATED ORAL EVERY 6 HOURS PRN
Qty: 30 TABLET | Refills: 0 | Status: SHIPPED | OUTPATIENT
Start: 2025-08-22 | End: 2025-08-22

## 2025-08-22 RX ORDER — HYDROXYZINE HYDROCHLORIDE 25 MG/1
25 TABLET, FILM COATED ORAL EVERY 6 HOURS PRN
Qty: 30 TABLET | Refills: 0 | Status: SHIPPED | OUTPATIENT
Start: 2025-08-22

## 2025-08-22 RX ORDER — TRAMADOL HYDROCHLORIDE 50 MG/1
50 TABLET ORAL EVERY 6 HOURS PRN
Qty: 20 TABLET | Refills: 0 | Status: SHIPPED | OUTPATIENT
Start: 2025-08-22 | End: 2025-08-22

## 2025-08-22 RX ORDER — MONTELUKAST SODIUM 10 MG/1
10 TABLET ORAL AT BEDTIME
Status: SHIPPED
Start: 2025-08-22

## 2025-08-22 RX ORDER — BUDESONIDE 0.5 MG/2ML
0.5 INHALANT ORAL 2 TIMES DAILY
Status: SHIPPED
Start: 2025-08-22

## 2025-08-22 RX ORDER — IPRATROPIUM BROMIDE AND ALBUTEROL SULFATE 2.5; .5 MG/3ML; MG/3ML
3 SOLUTION RESPIRATORY (INHALATION) 4 TIMES DAILY
Status: SHIPPED
Start: 2025-08-22

## 2025-08-22 RX ADMIN — ISOSORBIDE MONONITRATE 30 MG: 30 TABLET, EXTENDED RELEASE ORAL at 09:25

## 2025-08-22 RX ADMIN — FLUOXETINE HYDROCHLORIDE 60 MG: 20 CAPSULE ORAL at 09:23

## 2025-08-22 RX ADMIN — PREDNISONE 40 MG: 20 TABLET ORAL at 09:26

## 2025-08-22 RX ADMIN — MYCOPHENOLATE MOFETIL 750 MG: 250 CAPSULE ORAL at 09:24

## 2025-08-22 RX ADMIN — CEFTRIAXONE SODIUM 2 G: 2 INJECTION, POWDER, FOR SOLUTION INTRAMUSCULAR; INTRAVENOUS at 06:11

## 2025-08-22 RX ADMIN — BUDESONIDE INHALATION 0.5 MG: 0.5 SUSPENSION RESPIRATORY (INHALATION) at 07:17

## 2025-08-22 RX ADMIN — POLYETHYLENE GLYCOL 3350 17 G: 17 POWDER, FOR SOLUTION ORAL at 09:21

## 2025-08-22 RX ADMIN — IPRATROPIUM BROMIDE AND ALBUTEROL SULFATE 3 ML: .5; 3 SOLUTION RESPIRATORY (INHALATION) at 07:17

## 2025-08-22 RX ADMIN — GUAIFENESIN AND CODEINE PHOSPHATE 5 ML: 100; 10 SOLUTION ORAL at 02:54

## 2025-08-22 RX ADMIN — PANTOPRAZOLE SODIUM 40 MG: 20 TABLET, DELAYED RELEASE ORAL at 09:25

## 2025-08-22 RX ADMIN — NALTREXONE HYDROCHLORIDE 50 MG: 50 TABLET, FILM COATED ORAL at 09:25

## 2025-08-22 RX ADMIN — ROSUVASTATIN CALCIUM 20 MG: 10 TABLET, FILM COATED ORAL at 09:25

## 2025-08-22 RX ADMIN — ENTECAVIR 0.5 MG: 0.5 TABLET ORAL at 09:22

## 2025-08-22 RX ADMIN — METOPROLOL SUCCINATE 12.5 MG: 25 TABLET, EXTENDED RELEASE ORAL at 09:24

## 2025-08-22 RX ADMIN — BENZONATATE 100 MG: 100 CAPSULE ORAL at 09:22

## 2025-08-22 RX ADMIN — EMPAGLIFLOZIN 10 MG: 10 TABLET, FILM COATED ORAL at 09:26

## 2025-08-22 RX ADMIN — ASPIRIN 81 MG: 81 TABLET, COATED ORAL at 09:22

## 2025-08-22 ASSESSMENT — ACTIVITIES OF DAILY LIVING (ADL)
ADLS_ACUITY_SCORE: 50

## 2025-08-23 LAB
ACID FAST STAIN (ARUP): NORMAL
ACID FAST STAIN (ARUP): NORMAL
BACTERIA BRONCH: NO GROWTH
CMV DNA SPEC QL NAA+PROBE: NOT DETECTED
GALACTOMANNAN AG BAL QL: NEGATIVE
GALACTOMANNAN AG SPEC IA-ACNC: 0.06
P JIROVECII DNA SPEC QL NAA+PROBE: DETECTED

## 2025-08-24 ENCOUNTER — HOSPITAL ENCOUNTER (INPATIENT)
Facility: CLINIC | Age: 65
LOS: 2 days | Discharge: NURSING, REHAB OR LONG-TERM CARE | End: 2025-08-26
Attending: EMERGENCY MEDICINE
Payer: COMMERCIAL

## 2025-08-24 ENCOUNTER — TELEPHONE (OUTPATIENT)
Dept: TRANSPLANT | Facility: CLINIC | Age: 65
End: 2025-08-24
Payer: COMMERCIAL

## 2025-08-24 ENCOUNTER — MYC MEDICAL ADVICE (OUTPATIENT)
Dept: NEPHROLOGY | Facility: CLINIC | Age: 65
End: 2025-08-24

## 2025-08-24 DIAGNOSIS — B59 PNEUMONIA OF BOTH LUNGS DUE TO PNEUMOCYSTIS JIROVECII, UNSPECIFIED PART OF LUNG (H): Primary | ICD-10-CM

## 2025-08-24 ASSESSMENT — ACTIVITIES OF DAILY LIVING (ADL)
ADLS_ACUITY_SCORE: 59

## 2025-08-25 ENCOUNTER — PATIENT OUTREACH (OUTPATIENT)
Dept: CARE COORDINATION | Facility: CLINIC | Age: 65
End: 2025-08-25
Payer: COMMERCIAL

## 2025-08-25 LAB
ALBUMIN SERPL ELPH-MCNC: 2.8 G/DL (ref 3.7–5.1)
ALPHA1 GLOB SERPL ELPH-MCNC: 0.4 G/DL (ref 0.2–0.4)
ALPHA2 GLOB SERPL ELPH-MCNC: 1 G/DL (ref 0.5–0.9)
B-GLOBULIN SERPL ELPH-MCNC: 0.8 G/DL (ref 0.6–1)
GAMMA GLOB SERPL ELPH-MCNC: 0.6 G/DL (ref 0.7–1.6)
HADV DNA SPEC QL NAA+PROBE: NOT DETECTED
KAPPA LC FREE SER-MCNC: 1.01 MG/DL (ref 0.33–1.94)
KAPPA LC FREE/LAMBDA FREE SER NEPH: 0.79 {RATIO} (ref 0.26–1.65)
LAMBDA LC FREE SERPL-MCNC: 1.28 MG/DL (ref 0.57–2.63)
M PROTEIN SERPL ELPH-MCNC: 0 G/DL
PATH REPORT.COMMENTS IMP SPEC: NORMAL
PATH REPORT.COMMENTS IMP SPEC: NORMAL
PATH REPORT.FINAL DX SPEC: NORMAL
PATH REPORT.GROSS SPEC: NORMAL
PATH REPORT.MICROSCOPIC SPEC OTHER STN: NORMAL
PATH REPORT.RELEVANT HX SPEC: NORMAL
PROT PATTERN SERPL ELPH-IMP: ABNORMAL
SCANNED LAB RESULT: NORMAL
SCANNED LAB RESULT: NORMAL

## 2025-08-25 PROCEDURE — 84165 PROTEIN E-PHORESIS SERUM: CPT | Mod: 26 | Performed by: PATHOLOGY

## 2025-08-25 ASSESSMENT — ACTIVITIES OF DAILY LIVING (ADL)
ADLS_ACUITY_SCORE: 51
ADLS_ACUITY_SCORE: 51
ADLS_ACUITY_SCORE: 53
EQUIPMENT_CURRENTLY_USED_AT_HOME: GRAB BAR, TOILET;GRAB BAR, TUB/SHOWER;WALKER, STANDARD
ADLS_ACUITY_SCORE: 59
ADLS_ACUITY_SCORE: 51
ADLS_ACUITY_SCORE: 53
ADLS_ACUITY_SCORE: 53
ADLS_ACUITY_SCORE: 51
ADLS_ACUITY_SCORE: 53
ADLS_ACUITY_SCORE: 51
ADLS_ACUITY_SCORE: 53
ADLS_ACUITY_SCORE: 51
ADLS_ACUITY_SCORE: 53
DEPENDENT_IADLS:: INDEPENDENT
ADLS_ACUITY_SCORE: 51
ADLS_ACUITY_SCORE: 51

## 2025-08-26 ENCOUNTER — LAB REQUISITION (OUTPATIENT)
Dept: LAB | Facility: CLINIC | Age: 65
End: 2025-08-26
Payer: COMMERCIAL

## 2025-08-26 DIAGNOSIS — D64.9 ANEMIA, UNSPECIFIED: ICD-10-CM

## 2025-08-26 DIAGNOSIS — E78.5 HYPERLIPIDEMIA, UNSPECIFIED: ICD-10-CM

## 2025-08-26 DIAGNOSIS — E87.6 HYPOKALEMIA: ICD-10-CM

## 2025-08-26 DIAGNOSIS — I10 ESSENTIAL (PRIMARY) HYPERTENSION: ICD-10-CM

## 2025-08-26 ASSESSMENT — ACTIVITIES OF DAILY LIVING (ADL)
ADLS_ACUITY_SCORE: 53

## 2025-08-27 ENCOUNTER — PATIENT OUTREACH (OUTPATIENT)
Dept: CARE COORDINATION | Facility: CLINIC | Age: 65
End: 2025-08-27
Payer: COMMERCIAL

## 2025-08-27 LAB
BACTERIA BRONCH: NORMAL
PATH REPORT.COMMENTS IMP SPEC: NORMAL
PATH REPORT.COMMENTS IMP SPEC: NORMAL
PATH REPORT.FINAL DX SPEC: NORMAL
PATH REPORT.MICROSCOPIC SPEC OTHER STN: NORMAL
PATH REPORT.MICROSCOPIC SPEC OTHER STN: NORMAL
PATH REPORT.RELEVANT HX SPEC: NORMAL

## 2025-08-27 PROCEDURE — 85060 BLOOD SMEAR INTERPRETATION: CPT | Performed by: PATHOLOGY

## 2025-08-28 LAB
BACTERIA BRONCH: NORMAL
BACTERIA BRONCH: NORMAL

## 2025-09-02 ENCOUNTER — DOCUMENTATION ONLY (OUTPATIENT)
Dept: PULMONOLOGY | Facility: CLINIC | Age: 65
End: 2025-09-02

## 2025-09-02 ENCOUNTER — OFFICE VISIT (OUTPATIENT)
Dept: PULMONOLOGY | Facility: CLINIC | Age: 65
End: 2025-09-02
Payer: COMMERCIAL

## 2025-09-02 VITALS — OXYGEN SATURATION: 96 % | DIASTOLIC BLOOD PRESSURE: 71 MMHG | SYSTOLIC BLOOD PRESSURE: 100 MMHG | HEART RATE: 103 BPM

## 2025-09-02 DIAGNOSIS — J45.30 MILD PERSISTENT ASTHMA WITHOUT COMPLICATION: ICD-10-CM

## 2025-09-02 DIAGNOSIS — R06.02 SHORTNESS OF BREATH: ICD-10-CM

## 2025-09-02 DIAGNOSIS — B59 PNEUMONIA DUE TO PNEUMOCYSTIS JIROVECII, UNSPECIFIED LATERALITY, UNSPECIFIED PART OF LUNG (H): Primary | ICD-10-CM

## 2025-09-02 LAB
ERYTHROCYTE [DISTWIDTH] IN BLOOD BY AUTOMATED COUNT: 18.6 % (ref 10–15)
HCT VFR BLD AUTO: 33.6 % (ref 40–53)
HGB BLD-MCNC: 9.9 G/DL (ref 13.3–17.7)
MCH RBC QN AUTO: 24.2 PG (ref 26.5–33)
MCHC RBC AUTO-ENTMCNC: 29.5 G/DL (ref 31.5–36.5)
MCV RBC AUTO: 82.2 FL (ref 78–100)
PLATELET # BLD AUTO: 606 10E3/UL (ref 150–450)
RBC # BLD AUTO: 4.09 10E6/UL (ref 4.4–5.9)
WBC # BLD AUTO: 9.24 10E3/UL (ref 4–11)

## 2025-09-02 PROCEDURE — 3074F SYST BP LT 130 MM HG: CPT | Performed by: NURSE PRACTITIONER

## 2025-09-02 PROCEDURE — 3078F DIAST BP <80 MM HG: CPT | Performed by: NURSE PRACTITIONER

## 2025-09-02 PROCEDURE — 99214 OFFICE O/P EST MOD 30 MIN: CPT | Performed by: NURSE PRACTITIONER

## 2025-09-02 ASSESSMENT — ASTHMA QUESTIONNAIRES
EMERGENCY_ROOM_LAST_YEAR_TOTAL: TWO
QUESTION_2 LAST FOUR WEEKS HOW OFTEN HAVE YOU HAD SHORTNESS OF BREATH: MORE THAN ONCE A DAY
QUESTION_3 LAST FOUR WEEKS HOW OFTEN DID YOUR ASTHMA SYMPTOMS (WHEEZING, COUGHING, SHORTNESS OF BREATH, CHEST TIGHTNESS OR PAIN) WAKE YOU UP AT NIGHT OR EARLIER THAN USUAL IN THE MORNING: FOUR OR MORE NIGHTS A WEEK
QUESTION_1 LAST FOUR WEEKS HOW MUCH OF THE TIME DID YOUR ASTHMA KEEP YOU FROM GETTING AS MUCH DONE AT WORK, SCHOOL OR AT HOME: SOME OF THE TIME
HOSPITALIZATION_OVERNIGHT_LAST_YEAR_TOTAL: TWO
QUESTION_4 LAST FOUR WEEKS HOW OFTEN HAVE YOU USED YOUR RESCUE INHALER OR NEBULIZER MEDICATION (SUCH AS ALBUTEROL): THREE OR MORE TIMES PER DAY
ACT_TOTALSCORE: 9
QUESTION_5 LAST FOUR WEEKS HOW WOULD YOU RATE YOUR ASTHMA CONTROL: SOMEWHAT CONTROLLED

## 2025-09-04 ENCOUNTER — OFFICE VISIT (OUTPATIENT)
Dept: CARDIOLOGY | Facility: CLINIC | Age: 65
End: 2025-09-04
Payer: COMMERCIAL

## 2025-09-04 ENCOUNTER — APPOINTMENT (OUTPATIENT)
Dept: CARDIOLOGY | Facility: CLINIC | Age: 65
End: 2025-09-04
Payer: COMMERCIAL

## 2025-09-04 VITALS
HEART RATE: 101 BPM | WEIGHT: 155 LBS | HEIGHT: 67 IN | BODY MASS INDEX: 24.33 KG/M2 | RESPIRATION RATE: 16 BRPM | SYSTOLIC BLOOD PRESSURE: 98 MMHG | DIASTOLIC BLOOD PRESSURE: 70 MMHG

## 2025-09-04 DIAGNOSIS — I50.32 CHRONIC HEART FAILURE WITH PRESERVED EJECTION FRACTION (H): Primary | ICD-10-CM

## 2025-09-04 DIAGNOSIS — Z95.1 S/P CABG (CORONARY ARTERY BYPASS GRAFT): ICD-10-CM

## 2025-09-04 DIAGNOSIS — I95.89 OTHER SPECIFIED HYPOTENSION: ICD-10-CM

## 2025-09-04 DIAGNOSIS — R53.81 PHYSICAL DECONDITIONING: ICD-10-CM

## 2025-09-04 DIAGNOSIS — R00.0 TACHYCARDIA: ICD-10-CM

## 2025-09-04 PROBLEM — I95.9 TRANSIENT HYPOTENSION: Status: RESOLVED | Noted: 2023-08-11 | Resolved: 2025-09-04

## 2025-09-04 PROBLEM — I25.118 CORONARY ARTERY DISEASE OF NATIVE ARTERY OF NATIVE HEART WITH STABLE ANGINA PECTORIS: Status: RESOLVED | Noted: 2023-06-15 | Resolved: 2025-09-04

## 2025-09-04 PROBLEM — I10 HTN (HYPERTENSION): Status: RESOLVED | Noted: 2022-11-29 | Resolved: 2025-09-04

## 2025-09-04 PROBLEM — R09.02 HYPOXIA: Status: RESOLVED | Noted: 2023-08-20 | Resolved: 2025-09-04

## 2025-09-04 PROBLEM — J96.01 ACUTE RESPIRATORY FAILURE WITH HYPOXIA (H): Status: RESOLVED | Noted: 2025-07-21 | Resolved: 2025-09-04

## 2025-09-04 PROBLEM — R06.02 SHORTNESS OF BREATH: Status: RESOLVED | Noted: 2025-07-09 | Resolved: 2025-09-04

## 2025-09-04 LAB
BACTERIA BRONCH: NORMAL

## 2025-09-04 RX ORDER — ISOSORBIDE MONONITRATE 60 MG/1
TABLET, EXTENDED RELEASE ORAL
Status: SHIPPED
Start: 2025-09-04

## 2025-09-04 RX ORDER — METOPROLOL SUCCINATE 25 MG/1
25 TABLET, EXTENDED RELEASE ORAL AT BEDTIME
Status: SHIPPED
Start: 2025-09-04

## 2025-09-04 RX ORDER — ISOSORBIDE MONONITRATE 60 MG/1
TABLET, EXTENDED RELEASE ORAL
Status: SHIPPED
Start: 2025-09-04 | End: 2025-09-04

## 2025-09-24 ENCOUNTER — PRE VISIT (OUTPATIENT)
Dept: ORTHOPEDICS | Facility: CLINIC | Age: 65
End: 2025-09-24

## (undated) DEVICE — CEMENT PRESSURIZER FEMORAL CANAL MED 0206-546-000

## (undated) DEVICE — CATH DIAG 4FR AR 1 MOD 538441

## (undated) DEVICE — SUCTION IRR SYSTEM W/O TIP INTERPULSE HANDPIECE 0210-100-000

## (undated) DEVICE — BLADE SAW SAGITTAL STRK 18X90X1.27MM HD SYS 6 6118-127-090

## (undated) DEVICE — PACK TOTAL HIP W/U DRAPE SOP15HUFSC

## (undated) DEVICE — SU WND CLOSURE VLOC 180 ABS 0 24" GS-25 VLOCL0436

## (undated) DEVICE — SYR 20ML LL W/O NDL 302830

## (undated) DEVICE — SHEATH ULTIMUM 6FR 12CM 407845

## (undated) DEVICE — IMM PILLOW ABDUCT HIP MED 31143061

## (undated) DEVICE — SOL WATER IRRIG 1000ML BOTTLE 2F7114

## (undated) DEVICE — CATH ANGIO INFINITI AL1 4FRX100CM 538445

## (undated) DEVICE — SPONGE RAY-TEC 4X8" 7318

## (undated) DEVICE — MANIFOLD KIT ANGIO AUTOMATED 014613

## (undated) DEVICE — CATH ANGIO INFINITI JL5 4FRX100CM 538422

## (undated) DEVICE — SUCTION MANIFOLD NEPTUNE 2 SYS 1 PORT 702-025-000

## (undated) DEVICE — SYR ANGIOGRAPHY MULTIUSE KIT ACIST 014612

## (undated) DEVICE — PUMP UNIT NEGATIVE PRESSURE PREVENA PLUS PRE4010.S

## (undated) DEVICE — ELECTRODE ADULT PACING MULTI P-211-M1

## (undated) DEVICE — KIT DRSG PREVENA PLUS NEG PRESSURE W/CANISTER PRE4001US

## (undated) DEVICE — PACK SET-UP STD 9102

## (undated) DEVICE — NDL 18GA 1.5" 305196

## (undated) DEVICE — LINEN TOWEL PACK X5 5464

## (undated) DEVICE — ESU ELEC BLADE 6" COATED E1450-6

## (undated) DEVICE — CATH ANGIO INFINITI IM 4FRX100CM 538460

## (undated) DEVICE — SU ETHIBOND 0 CTX CR  8X18" CX31D

## (undated) DEVICE — DRSG PREVENA NEGATIVE PRESSURE WND 13CM SGL LF PRE1101US

## (undated) DEVICE — CATH DIAG 4FR STR PIG 538450S

## (undated) DEVICE — ESU GROUND PAD UNIVERSAL W/O CORD

## (undated) DEVICE — GLOVE BIOGEL PI MICRO INDICATOR UNDERGLOVE SZ 8.0 48980

## (undated) DEVICE — KIT HAND CONTROL ACIST 014644 AR-P54

## (undated) DEVICE — CATH ANGIO INFINITI JL4 4FRX100CM 538420

## (undated) DEVICE — DRSG KERLIX FLUFFS X5

## (undated) DEVICE — SPONGE LAP 18X18" X8435

## (undated) DEVICE — DEVICE RETRIEVER HEWSON 71111579

## (undated) DEVICE — CUSTOM PACK CORONARY SAN5BCRHEA

## (undated) DEVICE — MANIFOLD NEPTUNE 4 PORT 700-20

## (undated) DEVICE — SU VICRYL 2-0 CP-1 27" UND J266H

## (undated) DEVICE — CATH TRAY FOLEY COUDE SURESTEP 16FR W/DRN BAG LATEX A304416A

## (undated) DEVICE — GLOVE BIOGEL PI SZ 8.0 40880

## (undated) DEVICE — SU FIBERWIRE 5 CCS-1 BLUE  AR-7211

## (undated) RX ORDER — DROPERIDOL 2.5 MG/ML
INJECTION, SOLUTION INTRAMUSCULAR; INTRAVENOUS
Status: DISPENSED
Start: 2023-06-19

## (undated) RX ORDER — GLYCOPYRROLATE 0.2 MG/ML
INJECTION, SOLUTION INTRAMUSCULAR; INTRAVENOUS
Status: DISPENSED
Start: 2023-06-19

## (undated) RX ORDER — ALBUTEROL SULFATE 0.83 MG/ML
SOLUTION RESPIRATORY (INHALATION)
Status: DISPENSED
Start: 2018-04-17

## (undated) RX ORDER — ONDANSETRON 2 MG/ML
INJECTION INTRAMUSCULAR; INTRAVENOUS
Status: DISPENSED
Start: 2023-06-19

## (undated) RX ORDER — METOPROLOL SUCCINATE 25 MG/1
TABLET, EXTENDED RELEASE ORAL
Status: DISPENSED
Start: 2023-06-19

## (undated) RX ORDER — LIDOCAINE HYDROCHLORIDE AND EPINEPHRINE 10; 10 MG/ML; UG/ML
INJECTION, SOLUTION INFILTRATION; PERINEURAL
Status: DISPENSED
Start: 2025-08-21

## (undated) RX ORDER — DEXAMETHASONE SODIUM PHOSPHATE 4 MG/ML
INJECTION, SOLUTION INTRA-ARTICULAR; INTRALESIONAL; INTRAMUSCULAR; INTRAVENOUS; SOFT TISSUE
Status: DISPENSED
Start: 2023-06-19

## (undated) RX ORDER — VANCOMYCIN HYDROCHLORIDE 1 G/20ML
INJECTION, POWDER, LYOPHILIZED, FOR SOLUTION INTRAVENOUS
Status: DISPENSED
Start: 2023-06-19

## (undated) RX ORDER — FENTANYL CITRATE 50 UG/ML
INJECTION, SOLUTION INTRAMUSCULAR; INTRAVENOUS
Status: DISPENSED
Start: 2021-09-20

## (undated) RX ORDER — LIDOCAINE HYDROCHLORIDE 10 MG/ML
INJECTION, SOLUTION EPIDURAL; INFILTRATION; INTRACAUDAL; PERINEURAL
Status: DISPENSED
Start: 2023-04-10

## (undated) RX ORDER — DOBUTAMINE HYDROCHLORIDE 200 MG/100ML
INJECTION INTRAVENOUS
Status: DISPENSED
Start: 2020-01-14

## (undated) RX ORDER — SIMETHICONE 20 MG/.3ML
EMULSION ORAL
Status: DISPENSED
Start: 2020-01-22

## (undated) RX ORDER — PROPOFOL 10 MG/ML
INJECTION, EMULSION INTRAVENOUS
Status: DISPENSED
Start: 2025-08-21

## (undated) RX ORDER — CEFAZOLIN SODIUM 1 G/3ML
INJECTION, POWDER, FOR SOLUTION INTRAMUSCULAR; INTRAVENOUS
Status: DISPENSED
Start: 2023-06-19

## (undated) RX ORDER — TRANEXAMIC ACID 10 MG/ML
INJECTION, SOLUTION INTRAVENOUS
Status: DISPENSED
Start: 2023-06-19

## (undated) RX ORDER — LIDOCAINE HYDROCHLORIDE 10 MG/ML
INJECTION, SOLUTION EPIDURAL; INFILTRATION; INTRACAUDAL; PERINEURAL
Status: DISPENSED
Start: 2018-11-10

## (undated) RX ORDER — METOPROLOL TARTRATE 1 MG/ML
INJECTION, SOLUTION INTRAVENOUS
Status: DISPENSED
Start: 2020-01-14

## (undated) RX ORDER — FENTANYL CITRATE 50 UG/ML
INJECTION, SOLUTION INTRAMUSCULAR; INTRAVENOUS
Status: DISPENSED
Start: 2020-01-22

## (undated) RX ORDER — CLINDAMYCIN PHOSPHATE 900 MG/50ML
INJECTION, SOLUTION INTRAVENOUS
Status: DISPENSED
Start: 2023-06-19

## (undated) RX ORDER — ATROPINE SULFATE 0.4 MG/ML
AMPUL (ML) INJECTION
Status: DISPENSED
Start: 2020-01-14

## (undated) RX ORDER — ALBUTEROL SULFATE 0.83 MG/ML
SOLUTION RESPIRATORY (INHALATION)
Status: DISPENSED
Start: 2018-03-13

## (undated) RX ORDER — PROPOFOL 10 MG/ML
INJECTION, EMULSION INTRAVENOUS
Status: DISPENSED
Start: 2023-06-19

## (undated) RX ORDER — HYDROMORPHONE HYDROCHLORIDE 1 MG/ML
INJECTION, SOLUTION INTRAMUSCULAR; INTRAVENOUS; SUBCUTANEOUS
Status: DISPENSED
Start: 2023-06-19

## (undated) RX ORDER — DIAZEPAM 5 MG
TABLET ORAL
Status: DISPENSED
Start: 2021-09-20

## (undated) RX ORDER — FENTANYL CITRATE 50 UG/ML
INJECTION, SOLUTION INTRAMUSCULAR; INTRAVENOUS
Status: DISPENSED
Start: 2025-08-21

## (undated) RX ORDER — CEFAZOLIN SODIUM/WATER 2 G/20 ML
SYRINGE (ML) INTRAVENOUS
Status: DISPENSED
Start: 2023-06-19

## (undated) RX ORDER — LIDOCAINE HYDROCHLORIDE 10 MG/ML
INJECTION, SOLUTION EPIDURAL; INFILTRATION; INTRACAUDAL; PERINEURAL
Status: DISPENSED
Start: 2018-11-08

## (undated) RX ORDER — FENTANYL CITRATE 50 UG/ML
INJECTION, SOLUTION INTRAMUSCULAR; INTRAVENOUS
Status: DISPENSED
Start: 2023-06-19